# Patient Record
Sex: FEMALE | Race: WHITE | NOT HISPANIC OR LATINO | Employment: OTHER | URBAN - METROPOLITAN AREA
[De-identification: names, ages, dates, MRNs, and addresses within clinical notes are randomized per-mention and may not be internally consistent; named-entity substitution may affect disease eponyms.]

---

## 2017-01-06 ENCOUNTER — ANTICOAGULATION MONITORING (OUTPATIENT)
Dept: VASCULAR LAB | Facility: MEDICAL CENTER | Age: 82
End: 2017-01-06

## 2017-01-06 DIAGNOSIS — I48.91 ATRIAL FIBRILLATION, UNSPECIFIED TYPE (HCC): ICD-10-CM

## 2017-01-06 DIAGNOSIS — Z79.01 LONG TERM CURRENT USE OF ANTICOAGULANTS WITH INR GOAL OF 2.0-3.0: ICD-10-CM

## 2017-01-06 LAB — INR PPP: 3.3 (ref 2–3.5)

## 2017-01-06 NOTE — PROGRESS NOTES
Anticoagulation Summary as of 1/6/2017     INR goal 2.0-3.0   Selected INR 3.3! (1/6/2017)   Maintenance plan 2.5 mg (2.5 mg x 1) on Mon, Fri; 5 mg (2.5 mg x 2) all other days   Weekly total 30 mg   Plan last modified Toney Spicer, PHARMD (12/5/2016)   Next INR check 1/20/2017   Target end date     Indications   Long term current use of anticoagulants with INR goal of 2.0-3.0 [Z79.01]  Deep venous thrombosis  left [I82.402]  Atrial fibrillation [I48.91] [I48.91]         Anticoagulation Episode Summary     INR check location Home Draw    Preferred lab     Send INR reminders to     Comments       Anticoagulation Care Providers     Provider Role Specialty Phone number    Megan Howard A.P.VICTOR HUGO. Referring Family Medicine [phone]    Rachel Alejandra, PHARMD Responsible          Spoke with Marry to report a supra therapeutic INR of 3.3.  Will decrease tonight's dose to 1.25mg.  Pt wishes to alternate between 30mg/week and 32.5mg/week.  Lets try that and retest in two weeks.  Rachel Alejandra, JUDYD

## 2017-01-09 DIAGNOSIS — M54.50 CHRONIC MIDLINE LOW BACK PAIN WITHOUT SCIATICA: ICD-10-CM

## 2017-01-09 DIAGNOSIS — M06.9 RHEUMATOID ARTHRITIS, INVOLVING UNSPECIFIED SITE, UNSPECIFIED RHEUMATOID FACTOR PRESENCE: ICD-10-CM

## 2017-01-09 DIAGNOSIS — Z79.01 LONG TERM CURRENT USE OF ANTICOAGULANTS WITH INR GOAL OF 2.0-3.0: ICD-10-CM

## 2017-01-09 DIAGNOSIS — G89.29 CHRONIC MIDLINE LOW BACK PAIN WITHOUT SCIATICA: ICD-10-CM

## 2017-01-09 RX ORDER — HYDROCODONE BITARTRATE AND ACETAMINOPHEN 5; 325 MG/1; MG/1
TABLET ORAL
Qty: 30 TAB | Refills: 0 | Status: CANCELLED | OUTPATIENT
Start: 2017-01-09

## 2017-01-09 NOTE — TELEPHONE ENCOUNTER
Was the patient seen in the last year in this department? Yes     Does patient have an active prescription for medications requested? No     Received Request Via: Patient     Pt would like 3 month supply of Lyrica please. Its less money for 3 months. Thank you    She will  Norco tomorrow. Thank you

## 2017-01-10 DIAGNOSIS — M06.9 RHEUMATOID ARTHRITIS, INVOLVING UNSPECIFIED SITE, UNSPECIFIED RHEUMATOID FACTOR PRESENCE: ICD-10-CM

## 2017-01-10 RX ORDER — PREGABALIN 75 MG/1
75 CAPSULE ORAL 2 TIMES DAILY
Qty: 180 CAP | Refills: 1 | Status: SHIPPED | OUTPATIENT
Start: 2017-01-10 | End: 2017-11-02

## 2017-01-10 RX ORDER — HYDROCODONE BITARTRATE AND ACETAMINOPHEN 5; 325 MG/1; MG/1
TABLET ORAL
Qty: 30 TAB | Refills: 0 | Status: SHIPPED | OUTPATIENT
Start: 2017-01-10 | End: 2017-02-09 | Stop reason: SDUPTHER

## 2017-01-12 ENCOUNTER — OFFICE VISIT (OUTPATIENT)
Dept: RHEUMATOLOGY | Facility: PHYSICIAN GROUP | Age: 82
End: 2017-01-12
Payer: MEDICARE

## 2017-01-12 VITALS — WEIGHT: 168 LBS | BODY MASS INDEX: 29.4 KG/M2 | SYSTOLIC BLOOD PRESSURE: 130 MMHG | DIASTOLIC BLOOD PRESSURE: 72 MMHG

## 2017-01-12 DIAGNOSIS — I10 ESSENTIAL HYPERTENSION, BENIGN: ICD-10-CM

## 2017-01-12 DIAGNOSIS — R73.9 HYPERGLYCEMIA: ICD-10-CM

## 2017-01-12 DIAGNOSIS — I48.91 ATRIAL FIBRILLATION, UNSPECIFIED TYPE (HCC): ICD-10-CM

## 2017-01-12 DIAGNOSIS — M85.80 OSTEOPENIA: ICD-10-CM

## 2017-01-12 DIAGNOSIS — M06.9 RHEUMATOID ARTHRITIS, INVOLVING UNSPECIFIED SITE, UNSPECIFIED RHEUMATOID FACTOR PRESENCE: ICD-10-CM

## 2017-01-12 DIAGNOSIS — Z79.01 LONG TERM CURRENT USE OF ANTICOAGULANTS WITH INR GOAL OF 2.0-3.0: ICD-10-CM

## 2017-01-12 PROCEDURE — 99214 OFFICE O/P EST MOD 30 MIN: CPT | Performed by: INTERNAL MEDICINE

## 2017-01-12 NOTE — MR AVS SNAPSHOT
Marry Mathur   2017 1:30 PM   Office Visit   MRN: [id number]    Department:  Rheumatology Med [facility]   Dept Phone:  [phone]    Description:  Female : 1935   Provider:  Deanna Escoto M.D.           Reason for Visit     Follow-Up           Allergies as of 2017     Allergen Noted Reactions    Sulfa Drugs 2012   Vomiting    RXN=young person      Vital Signs     Blood Pressure Weight Smoking Status             130/72 mmHg 76.204 kg (168 lb) Never Smoker          Basic Information     Date Of Birth Sex Race Ethnicity Preferred Language    1935 Female White Non- English      Your appointments     Mar 16, 2017  1:00 PM   Follow Up Visit with Deanna Escoto M.D.   [facility] (--)    [address] [place] [address], [facility] [address]  [facility] [id number] [phone]           You will be receiving a confirmation call a few days before your appointment from our automated call confirmation system.              Problem List              ICD-10-CM Priority Class Noted - Resolved    Nonspecific abnormal electrocardiogram (ECG) (EKG) R94.31 High  2012 - Present    Right bundle branch block I45.10 High  2012 - Present    Essential hypertension, benign I10 High  2012 - Present    Other and unspecified hyperlipidemia E78.5 High  2012 - Present    Hyperglycemia R73.9 High  2012 - Present    GERD (gastroesophageal reflux disease) K21.9 High  2012 - Present    Back pain M54.9 High  2012 - Present    Long term current use of anticoagulants with INR goal of 2.0-3.0 Z79.01   3/8/2016 - Present    Deep venous thrombosis, left I82.402   3/8/2016 - Present    Rheumatoid arthritis (HCC) M06.9   3/14/2016 - Present    Atrial fibrillation [I48.91] I48.91   2016 - Present    Hernia, inguinal, right K40.90   2016 - Present      Health Maintenance        Date Due Completion Dates    IMM DTaP/Tdap/Td Vaccine (1 - Tdap) 1955 ---    COLONOSCOPY  8/27/1986 ---    IMM ZOSTER VACCINE 8/27/1996 ---    IMM PNEUMOCOCCAL 65+ (ADULT) LOW/MEDIUM RISK SERIES (2 of 2 - PPSV23) 3/8/2017 3/8/2016    MAMMOGRAM 3/18/2017 3/18/2016, 3/10/2016, 10/9/2006, 10/7/2005, 10/5/2004    BONE DENSITY 3/18/2021 3/18/2016            Current Immunizations     13-VALENT PCV PREVNAR 3/8/2016    Influenza Vaccine Adult HD 11/1/2016      Below and/or attached are the medications your provider expects you to take. Review all of your home medications and newly ordered medications with your provider and/or pharmacist. Follow medication instructions as directed by your provider and/or pharmacist. Please keep your medication list with you and share with your provider. Update the information when medications are discontinued, doses are changed, or new medications (including over-the-counter products) are added; and carry medication information at all times in the event of emergency situations     Allergies:  SULFA DRUGS - Vomiting               Medications  Valid as of: January 12, 2017 -  2:13 PM    Generic Name Brand Name Tablet Size Instructions for use    Acetaminophen (Tab) TYLENOL 325 MG Take 650 mg by mouth at bedtime as needed.        Adalimumab (Prefilled Syringe Kit) Adalimumab 40 MG/0.8ML Inject 0.8 mL as instructed every 14 days.        Atorvastatin Calcium (Tab) LIPITOR 20 MG Take 20 mg by mouth every evening.        Calcium Citrate-Vitamin D   Take 1 Tab by mouth every day.        Cholecalciferol (Tab) cholecalciferol 1000 UNIT Take 1,000 Units by mouth every day.        Folic Acid (Tab) FOLVITE 400 MCG Take 400 mcg by mouth every day.        Hydrocodone-Acetaminophen (Tab) NORCO 5-325 MG 1 tab po qhs for severe joint pain, NO ALCOHOL and NO DRIVING within 24 hrs of taking this medication, NO BENZODIAZEPINE medications, no selling or giving to any other persons        Magnesium Oxide (Tab) MAG- MG Take 400 mg by mouth 2 times a day.        MetFORMIN HCl (Tab) GLUCOPHAGE 500  MG Take 500 mg by mouth every day.        Methotrexate Sodium (Tab) methotrexate 2.5 MG 6 tabs po q wednesday        MethylPREDNISolone (Tab) MEDROL 4 MG 6 tabs po qday for one day then 5 tabs po qday for one day then 4 tabs po qday for one day then 3 tabs po qday for one day then 2 tabs po qday for one day then 1 tab po qday for one day then stop        Misc Natural Products   Take  by mouth.        Omeprazole (CAPSULE DELAYED RELEASE) PRILOSEC 20 MG Take 20 mg by mouth every day.        Potassium Gluconate   Take 1 Tab by mouth every day.        Pregabalin (Cap) LYRICA 75 MG Take 1 Cap by mouth 2 times a day.        Valsartan-Hydrochlorothiazide (Tab) DIOVAN-HCT 80-12.5 MG Take 0.5 Tabs by mouth every day.        Warfarin Sodium (Tab) COUMADIN 2.5 MG Take 2.5 mg by mouth every day. AS INSTRUCTED         .                 Medicines prescribed today were sent to:     [facility] [place] [address] [place] [address] [address] [facility] [address]    Phone: [phone] Fax: [phone]    Open 24 Hours?: No    [facility] [place] [address] AT [place] [address]  [address] [address], [place] [address] [facility] [address]    Phone: [phone] Fax: [phone]    Open 24 Hours?: No    [facility] [id number] [place], [place] - [address] [address]    Phone: [phone] Fax: [phone]    Open 24 Hours?: No      Medication refill instructions:       If your prescription bottle indicates you have medication refills left, it is not necessary to call your provider’s office. Please contact your pharmacy and they will refill your medication.    If your prescription bottle indicates you do not have any refills left, you may request refills at any time through one of the following ways: The online [employer] system (except Urgent Care), by calling your provider’s office, or by asking your pharmacy to contact your  provider’s office with a refill request. Medication refills are processed only during regular business hours and may not be available until the next business day. Your provider may request additional information or to have a follow-up visit with you prior to refilling your medication.   *Please Note: Medication refills are assigned a new Rx number when refilled electronically. Your pharmacy may indicate that no refills were authorized even though a new prescription for the same medication is available at the pharmacy. Please request the medicine by name with the pharmacy before contacting your provider for a refill.           [employer] Access Code: Activation code not generated  Current [employer] Status: Active

## 2017-01-18 DIAGNOSIS — I48.91 ATRIAL FIBRILLATION, UNSPECIFIED TYPE (HCC): ICD-10-CM

## 2017-01-18 RX ORDER — WARFARIN SODIUM 2.5 MG/1
TABLET ORAL
Qty: 180 TAB | Refills: 1 | Status: SHIPPED | OUTPATIENT
Start: 2017-01-18 | End: 2017-09-12 | Stop reason: SDUPTHER

## 2017-01-24 ENCOUNTER — ANTICOAGULATION MONITORING (OUTPATIENT)
Dept: VASCULAR LAB | Facility: MEDICAL CENTER | Age: 82
End: 2017-01-24

## 2017-01-24 DIAGNOSIS — Z79.01 LONG TERM CURRENT USE OF ANTICOAGULANTS WITH INR GOAL OF 2.0-3.0: ICD-10-CM

## 2017-01-24 DIAGNOSIS — I48.91 ATRIAL FIBRILLATION, UNSPECIFIED TYPE (HCC): ICD-10-CM

## 2017-01-24 LAB — INR PPP: 2 (ref 2–3.5)

## 2017-01-24 NOTE — PROGRESS NOTES
Anticoagulation Summary as of 1/24/2017     INR goal 2.0-3.0   Selected INR 2.0 (1/24/2017)   Maintenance plan 2.5 mg (2.5 mg x 1) on Mon, Fri; 5 mg (2.5 mg x 2) all other days   Weekly total 30 mg   Plan last modified Toney Spicer, PHARMD (12/5/2016)   Next INR check 2/7/2017   Target end date     Indications   Long term current use of anticoagulants with INR goal of 2.0-3.0 [Z79.01]  Deep venous thrombosis  left [I82.402]  Atrial fibrillation [I48.91] [I48.91]         Anticoagulation Episode Summary     INR check location Home Draw    Preferred lab     Send INR reminders to     Comments       Anticoagulation Care Providers     Provider Role Specialty Phone number    Megan Howard A.P.VICTOR HUGO. Referring Family Medicine [phone]    Rachel Alejandra, JUDYD Responsible          Anticoagulation Patient Findings    Left voicemail message to report a therapeutic INR of 2.0.  Pt to continue with current warfarin dosing regimen. Requested pt contact the clinic for any s/s of unusual bleeding, bruising, clotting or any changes to diet or medication. FU 2 weeks.  Steve Hahn, PHARMD

## 2017-02-07 ENCOUNTER — ANTICOAGULATION MONITORING (OUTPATIENT)
Dept: VASCULAR LAB | Facility: MEDICAL CENTER | Age: 82
End: 2017-02-07

## 2017-02-07 DIAGNOSIS — Z79.01 LONG TERM CURRENT USE OF ANTICOAGULANTS WITH INR GOAL OF 2.0-3.0: ICD-10-CM

## 2017-02-07 DIAGNOSIS — I48.0 PAROXYSMAL ATRIAL FIBRILLATION (HCC): ICD-10-CM

## 2017-02-07 LAB — INR PPP: 2.8 (ref 2–3.5)

## 2017-02-07 NOTE — PROGRESS NOTES
Anticoagulation Summary as of 2/7/2017     INR goal 2.0-3.0   Selected INR 2.8 (2/7/2017)   Maintenance plan 2.5 mg (2.5 mg x 1) on Mon, Fri; 5 mg (2.5 mg x 2) all other days   Weekly total 30 mg   Plan last modified Toney Spicer, PHARMD (12/5/2016)   Next INR check 2/21/2017   Target end date     Indications   Long term current use of anticoagulants with INR goal of 2.0-3.0 [Z79.01]  Deep venous thrombosis  left [I82.402]  Atrial fibrillation [I48.91] [I48.91]         Anticoagulation Episode Summary     INR check location Home Draw    Preferred lab     Send INR reminders to     Comments       Anticoagulation Care Providers     Provider Role Specialty Phone number    CELIO Baeaz. Referring Family Medicine [phone]    Rachel Alejandra, PHARMD Responsible          Left voicemail message to report a therapeutic INR of 2.8.  Pt to continue with current warfarin dosing regimen. Requested pt contact the clinic for any s/s of unusual bleeding, bruising, clotting or any changes to diet or medication. FU 2 weeks.  Rachel Alejandra, PHARMD

## 2017-02-08 DIAGNOSIS — M06.9 RHEUMATOID ARTHRITIS, INVOLVING UNSPECIFIED SITE, UNSPECIFIED RHEUMATOID FACTOR PRESENCE: ICD-10-CM

## 2017-02-08 RX ORDER — HYDROCODONE BITARTRATE AND ACETAMINOPHEN 5; 325 MG/1; MG/1
TABLET ORAL
Qty: 30 TAB | Refills: 0 | Status: CANCELLED | OUTPATIENT
Start: 2017-02-08

## 2017-02-09 DIAGNOSIS — M06.9 RHEUMATOID ARTHRITIS, INVOLVING UNSPECIFIED SITE, UNSPECIFIED RHEUMATOID FACTOR PRESENCE: ICD-10-CM

## 2017-02-09 RX ORDER — HYDROCODONE BITARTRATE AND ACETAMINOPHEN 5; 325 MG/1; MG/1
TABLET ORAL
Qty: 30 TAB | Refills: 0 | Status: SHIPPED | OUTPATIENT
Start: 2017-02-09 | End: 2017-03-09 | Stop reason: SDUPTHER

## 2017-02-09 NOTE — TELEPHONE ENCOUNTER
Was the patient seen in the last year in this department? Yes   Dec labs   Does patient have an active prescription for medications requested? No     Received Request Via: Patient       PT WOULD LIKE TO  THUR AT 1:00PM    THANK YOU

## 2017-02-16 ENCOUNTER — OFFICE VISIT (OUTPATIENT)
Dept: MEDICAL GROUP | Facility: PHYSICIAN GROUP | Age: 82
End: 2017-02-16
Payer: MEDICARE

## 2017-02-16 VITALS
BODY MASS INDEX: 28.35 KG/M2 | OXYGEN SATURATION: 95 % | SYSTOLIC BLOOD PRESSURE: 128 MMHG | RESPIRATION RATE: 12 BRPM | HEIGHT: 63 IN | TEMPERATURE: 97.9 F | DIASTOLIC BLOOD PRESSURE: 76 MMHG | WEIGHT: 160 LBS | HEART RATE: 69 BPM

## 2017-02-16 DIAGNOSIS — J20.9 ACUTE BRONCHITIS, UNSPECIFIED ORGANISM: Primary | ICD-10-CM

## 2017-02-16 PROCEDURE — 99214 OFFICE O/P EST MOD 30 MIN: CPT | Performed by: NURSE PRACTITIONER

## 2017-02-16 PROCEDURE — 1101F PT FALLS ASSESS-DOCD LE1/YR: CPT | Performed by: NURSE PRACTITIONER

## 2017-02-16 PROCEDURE — G8482 FLU IMMUNIZE ORDER/ADMIN: HCPCS | Performed by: NURSE PRACTITIONER

## 2017-02-16 PROCEDURE — 1036F TOBACCO NON-USER: CPT | Performed by: NURSE PRACTITIONER

## 2017-02-16 PROCEDURE — 4040F PNEUMOC VAC/ADMIN/RCVD: CPT | Performed by: NURSE PRACTITIONER

## 2017-02-16 PROCEDURE — G8420 CALC BMI NORM PARAMETERS: HCPCS | Performed by: NURSE PRACTITIONER

## 2017-02-16 PROCEDURE — G8432 DEP SCR NOT DOC, RNG: HCPCS | Performed by: NURSE PRACTITIONER

## 2017-02-16 RX ORDER — DOXYCYCLINE HYCLATE 100 MG
100 TABLET ORAL 2 TIMES DAILY
Qty: 20 TAB | Refills: 0 | Status: SHIPPED | OUTPATIENT
Start: 2017-02-16 | End: 2017-02-26

## 2017-02-16 ASSESSMENT — PATIENT HEALTH QUESTIONNAIRE - PHQ9: CLINICAL INTERPRETATION OF PHQ2 SCORE: 0

## 2017-02-16 ASSESSMENT — ENCOUNTER SYMPTOMS: COUGH: 1

## 2017-02-16 NOTE — MR AVS SNAPSHOT
"        Marry Mathur   2017 3:20 PM   Office Visit   MRN: [id number]    Department:  [facility]   Dept Phone:  [phone]    Description:  Female : 1935   Provider:  ELBA Baeza           Reason for Visit     Cough with yellow and red phlegm       Allergies as of 2017     Allergen Noted Reactions    Sulfa Drugs 2012   Vomiting    RXN=young person      You were diagnosed with     Acute bronchitis, unspecified organism   [[id number]]  -  Primary       Vital Signs     Blood Pressure Pulse Temperature Respirations Height Weight    128/76 mmHg 69 36.6 °C (97.9 °F) 12 1.6 m (5' 3\") 72.576 kg (160 lb)    Body Mass Index Oxygen Saturation Smoking Status             28.35 kg/m2 95% Never Smoker          Basic Information     Date Of Birth Sex Race Ethnicity Preferred Language    1935 Female White Non- English      Your appointments     Mar 03, 2017  3:15 PM   ECHO with ECHO [facility], IHV EXAM 12   ECHOCARDIOLOGY [facility] ([facility])    [address] [place] [address]   [phone]           No prep            Mar 16, 2017  1:00 PM   Follow Up Visit with Deanna Escoto M.D.   [facility] (--)    [address] [facility], [address]  [facility] [id number] [phone]           You will be receiving a confirmation call a few days before your appointment from our automated call confirmation system.              Problem List              ICD-10-CM Priority Class Noted - Resolved    Nonspecific abnormal electrocardiogram (ECG) (EKG) R94.31 High  2012 - Present    Right bundle branch block I45.10 High  2012 - Present    Essential hypertension, benign I10 High  2012 - Present    Other and unspecified hyperlipidemia E78.5 High  2012 - Present    Hyperglycemia R73.9 High  2012 - Present    GERD (gastroesophageal reflux disease) K21.9 High  2012 - Present    Back pain M54.9 High  2012 - Present    Long term current use of anticoagulants " with INR goal of 2.0-3.0 Z79.01   3/8/2016 - Present    Deep venous thrombosis, left I82.402   3/8/2016 - Present    Rheumatoid arthritis (CMS-HCC) M06.9   3/14/2016 - Present    Atrial fibrillation [I48.91] I48.91   4/19/2016 - Present    Hernia, inguinal, right K40.90   9/23/2016 - Present      Health Maintenance        Date Due Completion Dates    IMM DTaP/Tdap/Td Vaccine (1 - Tdap) 8/27/1955 ---    COLONOSCOPY 8/27/1986 ---    IMM ZOSTER VACCINE 8/27/1996 ---    IMM PNEUMOCOCCAL 65+ (ADULT) LOW/MEDIUM RISK SERIES (2 of 2 - PPSV23) 3/8/2017 3/8/2016    MAMMOGRAM 3/18/2017 3/18/2016, 3/10/2016, 10/9/2006, 10/7/2005, 10/5/2004    BONE DENSITY 3/18/2021 3/18/2016            Current Immunizations     13-VALENT PCV PREVNAR 3/8/2016    Influenza Vaccine Adult HD 11/1/2016      Below and/or attached are the medications your provider expects you to take. Review all of your home medications and newly ordered medications with your provider and/or pharmacist. Follow medication instructions as directed by your provider and/or pharmacist. Please keep your medication list with you and share with your provider. Update the information when medications are discontinued, doses are changed, or new medications (including over-the-counter products) are added; and carry medication information at all times in the event of emergency situations     Allergies:  SULFA DRUGS - Vomiting               Medications  Valid as of: February 16, 2017 -  5:46 PM    Generic Name Brand Name Tablet Size Instructions for use    Acetaminophen (Tab) TYLENOL 325 MG Take 650 mg by mouth at bedtime as needed.        Adalimumab (Prefilled Syringe Kit) Adalimumab 40 MG/0.8ML Inject 0.8 mL as instructed every 14 days.        Atorvastatin Calcium (Tab) LIPITOR 20 MG Take 20 mg by mouth every evening.        Calcium Citrate-Vitamin D   Take 1 Tab by mouth every day.        Cholecalciferol (Tab) cholecalciferol 1000 UNIT Take 1,000 Units by mouth every day.         Doxycycline Hyclate (Tab) VIBRAMYCIN 100 MG Take 1 Tab by mouth 2 times a day for 10 days.        Folic Acid (Tab) FOLVITE 400 MCG Take 400 mcg by mouth every day.        Hydrocodone-Acetaminophen (Tab) NORCO 5-325 MG 1 tab po qhs for severe joint pain, NO ALCOHOL and NO DRIVING within 24 hrs of taking this medication, NO BENZODIAZEPINE medications, no selling or giving to any other persons        Magnesium Oxide (Tab) MAG- MG Take 400 mg by mouth 2 times a day.        MetFORMIN HCl (Tab) GLUCOPHAGE 500 MG Take 500 mg by mouth every day.        Methotrexate Sodium (Tab) methotrexate 2.5 MG 6 tabs po q wednesday        Misc Natural Products   Take  by mouth.        Omeprazole (CAPSULE DELAYED RELEASE) PRILOSEC 20 MG Take 20 mg by mouth every day.        Potassium Gluconate   Take 1 Tab by mouth every day.        Pregabalin (Cap) LYRICA 75 MG Take 1 Cap by mouth 2 times a day.        Valsartan-Hydrochlorothiazide (Tab) DIOVAN-HCT 80-12.5 MG Take 0.5 Tabs by mouth every day.        Warfarin Sodium (Tab) COUMADIN 2.5 MG Take 1 to 2 tablets by mouth daily as directed by the coumadin clinic        .                 Medicines prescribed today were sent to:     [facility] [place] [address] [address] [address] [facility] [address]    Phone: [phone] Fax: [phone]    Open 24 Hours?: No    [facility] [place] [address] AT [facility] & [place] [address]  [address] [address], [place] [address] [facility] [address]    Phone: [phone] Fax: [phone]    Open 24 Hours?: No    [facility] - [place], [place] - [address] [address]    Phone: [phone] Fax: [phone]    Open 24 Hours?: No      Medication refill instructions:       If your prescription bottle indicates you have medication refills left, it is not necessary to call your provider’s office. Please contact your pharmacy and they will refill  your medication.    If your prescription bottle indicates you do not have any refills left, you may request refills at any time through one of the following ways: The online [employer] system (except Urgent Care), by calling your provider’s office, or by asking your pharmacy to contact your provider’s office with a refill request. Medication refills are processed only during regular business hours and may not be available until the next business day. Your provider may request additional information or to have a follow-up visit with you prior to refilling your medication.   *Please Note: Medication refills are assigned a new Rx number when refilled electronically. Your pharmacy may indicate that no refills were authorized even though a new prescription for the same medication is available at the pharmacy. Please request the medicine by name with the pharmacy before contacting your provider for a refill.           [employer] Access Code: Activation code not generated  Current [employer] Status: Active

## 2017-02-16 NOTE — Clinical Note
February 16, 2017        Marry Mathur  [address]  [place] [facility] [address]        To Whom it May Concern:    Marry remains under my care for her chronic medical needs.  We have discussed weight loss and have agreed that 150 pounds is a reasonable and healthy goal weight.    If you have any questions or concerns, please don't hesitate to call.        Sincerely,        CELIO Baeza.    Electronically Signed

## 2017-02-16 NOTE — PROGRESS NOTES
Subjective:      Marry Mathur is a 81 y.o. female who presents with Cough            Cough    Marry is here today to discuss the following new problem:    1. Acute bronchitis, unspecified organism  Patient reports a productive cough, that started on Saturday.  She states that she had a new pack of azithromycin and started this with completion.  She reports significant improvement however she has been experiencing persistent productive cough, congestion and audible wheezing.  She denies any measurable fever, but does report having some chills with hot flashes.  She denies any known sick contacts.  She's also been taking over-the-counter Mucinex and Ayaka-Quincy inconsistently, with good relief when taken.  Denies any shortness of breath or chest pain.    Active Ambulatory Problems     Diagnosis Date Noted   • Nonspecific abnormal electrocardiogram (ECG) (EKG) 06/27/2012   • Right bundle branch block 06/27/2012   • Essential hypertension, benign 06/27/2012   • Other and unspecified hyperlipidemia 06/27/2012   • Hyperglycemia 06/27/2012   • GERD (gastroesophageal reflux disease) 06/27/2012   • Back pain 06/27/2012   • Long term current use of anticoagulants with INR goal of 2.0-3.0 03/08/2016   • Deep venous thrombosis, left 03/08/2016   • Rheumatoid arthritis (CMS-[facility]) 03/14/2016   • Atrial fibrillation [I48.91] 04/19/2016   • Hernia, inguinal, right 09/23/2016     Resolved Ambulatory Problems     Diagnosis Date Noted   • Deep venous thrombosis (CMS-[facility]) 06/27/2012     Past Medical History   Diagnosis Date   • Prediabetes    • Hyperlipidemia    • Hypertension    • Rheumatoid arthritis with rheumatoid factor (CMS-HCC)    • Hiatus hernia syndrome    • Cancer (CMS-HCC)    • Chronic back pain greater than 3 months duration    • Blood clotting disorder (CMS-[facility]) 2012     Review of Systems   Respiratory: Positive for cough.         See HPI          Objective:     /76 mmHg  Pulse 69  Temp(Src) 36.6 °C (97.9 °F)   "Resp 12  Ht 1.6 m (5' 3\")  Wt 72.576 kg (160 lb)  BMI 28.35 kg/m2  SpO2 95%     Physical Exam   Constitutional: She appears well-developed and well-nourished.   Eyes: Conjunctivae and EOM are normal. Pupils are equal, round, and reactive to light.   Neck: Normal range of motion. Neck supple.   Cardiovascular: Normal rate.    Pulmonary/Chest: Effort normal. No respiratory distress. She has wheezes. She has rales. She exhibits no tenderness.   Psychiatric: She has a normal mood and affect. Her behavior is normal.   Nursing note and vitals reviewed.              Assessment/Plan:     1. Acute bronchitis, unspecified organism  Encourage patient to use over-the-counter remedies consistently for the next day or 2.  Discussed criteria that would warrant her starting the antibiotic.  Encouraged frequent hydration and rest.  Return to clinic if symptoms worsen or fail to improve despite a second course of antibiotics.  - doxycycline (VIBRAMYCIN) 100 MG Tab; Take 1 Tab by mouth 2 times a day for 10 days.  Dispense: 20 Tab; Refill: 0        "

## 2017-02-21 LAB — INR PPP: 2.8 (ref 2–3.5)

## 2017-02-22 ENCOUNTER — ANTICOAGULATION MONITORING (OUTPATIENT)
Dept: VASCULAR LAB | Facility: MEDICAL CENTER | Age: 82
End: 2017-02-22

## 2017-02-22 DIAGNOSIS — I48.0 PAROXYSMAL ATRIAL FIBRILLATION (HCC): ICD-10-CM

## 2017-02-22 DIAGNOSIS — Z79.01 LONG TERM CURRENT USE OF ANTICOAGULANTS WITH INR GOAL OF 2.0-3.0: ICD-10-CM

## 2017-02-22 NOTE — PROGRESS NOTES
Anticoagulation Summary as of 2/22/2017     INR goal 2.0-3.0   Selected INR 2.8 (2/21/2017)   Maintenance plan 2.5 mg (2.5 mg x 1) on Mon, Fri; 5 mg (2.5 mg x 2) all other days   Weekly total 30 mg   Plan last modified Toney Spicer, PHARMD (12/5/2016)   Next INR check 2/28/2017   Target end date     Indications   Long term current use of anticoagulants with INR goal of 2.0-3.0 [Z79.01]  Deep venous thrombosis  left [I82.402]  Atrial fibrillation [I48.91] [I48.91]         Anticoagulation Episode Summary     INR check location Home Draw    Preferred lab     Send INR reminders to     Comments       Anticoagulation Care Providers     Provider Role Specialty Phone number    CELIO Baeza. Referring Family Medicine [phone]    Rachel Alejandra, PHARMD Responsible          Spoke with Marry to report a therapeutic INR of 2.8. Pt will start a 10 day course of doxycycline tonight. Pt is to continue with current warfarin dosing regimen.  Follow up in 1 weeks.    Rachel Alejandra, JUDYD

## 2017-02-28 ENCOUNTER — HOSPITAL ENCOUNTER (OUTPATIENT)
Dept: LAB | Facility: MEDICAL CENTER | Age: 82
End: 2017-02-28
Attending: INTERNAL MEDICINE
Payer: MEDICARE

## 2017-02-28 LAB
ALBUMIN SERPL BCP-MCNC: 4.2 G/DL (ref 3.2–4.9)
ALBUMIN/GLOB SERPL: 1.4 G/DL
ALP SERPL-CCNC: 53 U/L (ref 30–99)
ALT SERPL-CCNC: 18 U/L (ref 2–50)
ANION GAP SERPL CALC-SCNC: 6 MMOL/L (ref 0–11.9)
AST SERPL-CCNC: 19 U/L (ref 12–45)
BASOPHILS # BLD AUTO: 0.06 K/UL (ref 0–0.12)
BASOPHILS NFR BLD AUTO: 1.1 % (ref 0–1.8)
BILIRUB SERPL-MCNC: 1.2 MG/DL (ref 0.1–1.5)
BUN SERPL-MCNC: 27 MG/DL (ref 8–22)
CALCIUM SERPL-MCNC: 9.8 MG/DL (ref 8.5–10.5)
CHLORIDE SERPL-SCNC: 107 MMOL/L (ref 96–112)
CHOLEST SERPL-MCNC: 151 MG/DL (ref 100–199)
CO2 SERPL-SCNC: 30 MMOL/L (ref 20–33)
CREAT SERPL-MCNC: 0.7 MG/DL (ref 0.5–1.4)
EOSINOPHIL # BLD: 0.18 K/UL (ref 0–0.51)
EOSINOPHIL NFR BLD AUTO: 3.2 % (ref 0–6.9)
ERYTHROCYTE [DISTWIDTH] IN BLOOD BY AUTOMATED COUNT: 48 FL (ref 35.9–50)
GLOBULIN SER CALC-MCNC: 2.9 G/DL (ref 1.9–3.5)
GLUCOSE SERPL-MCNC: 86 MG/DL (ref 65–99)
HCT VFR BLD AUTO: 45.4 % (ref 37–47)
HDLC SERPL-MCNC: 54 MG/DL
HGB BLD-MCNC: 14.5 G/DL (ref 12–16)
IMM GRANULOCYTES # BLD AUTO: 0.01 K/UL (ref 0–0.11)
IMM GRANULOCYTES NFR BLD AUTO: 0.2 % (ref 0–0.9)
LDLC SERPL CALC-MCNC: 84 MG/DL
LYMPHOCYTES # BLD: 1.92 K/UL (ref 1–4.8)
LYMPHOCYTES NFR BLD AUTO: 34.3 % (ref 22–41)
MCH RBC QN AUTO: 30.9 PG (ref 27–33)
MCHC RBC AUTO-ENTMCNC: 31.9 G/DL (ref 33.6–35)
MCV RBC AUTO: 96.8 FL (ref 81.4–97.8)
MONOCYTES # BLD: 0.44 K/UL (ref 0–0.85)
MONOCYTES NFR BLD AUTO: 7.9 % (ref 0–13.4)
NEUTROPHILS # BLD: 2.99 K/UL (ref 2–7.15)
NEUTROPHILS NFR BLD AUTO: 53.3 % (ref 44–72)
NRBC # BLD AUTO: 0 K/UL
NRBC BLD-RTO: 0 /100 WBC
PLATELET # BLD AUTO: 178 K/UL (ref 164–446)
PMV BLD AUTO: 12.1 FL (ref 9–12.9)
POTASSIUM SERPL-SCNC: 4 MMOL/L (ref 3.6–5.5)
PROT SERPL-MCNC: 7.1 G/DL (ref 6–8.2)
RBC # BLD AUTO: 4.69 M/UL (ref 4.2–5.4)
SODIUM SERPL-SCNC: 143 MMOL/L (ref 135–145)
TRIGL SERPL-MCNC: 67 MG/DL (ref 0–149)
WBC # BLD AUTO: 5.6 K/UL (ref 4.8–10.8)

## 2017-02-28 PROCEDURE — 80053 COMPREHEN METABOLIC PANEL: CPT

## 2017-02-28 PROCEDURE — 85025 COMPLETE CBC W/AUTO DIFF WBC: CPT

## 2017-02-28 PROCEDURE — 80061 LIPID PANEL: CPT

## 2017-02-28 PROCEDURE — 36415 COLL VENOUS BLD VENIPUNCTURE: CPT

## 2017-03-03 ENCOUNTER — HOSPITAL ENCOUNTER (OUTPATIENT)
Dept: CARDIOLOGY | Facility: MEDICAL CENTER | Age: 82
End: 2017-03-03
Attending: INTERNAL MEDICINE
Payer: MEDICARE

## 2017-03-03 DIAGNOSIS — R01.1 CARDIAC MURMUR: ICD-10-CM

## 2017-03-03 PROCEDURE — 93306 TTE W/DOPPLER COMPLETE: CPT

## 2017-03-05 LAB
LV EJECT FRACT MOD 2C 99903: 63.61
LV EJECT FRACT MOD 4C 99902: 65.52
LV EJECT FRACT MOD BP 99901: 65.14

## 2017-03-09 DIAGNOSIS — M06.9 RHEUMATOID ARTHRITIS, INVOLVING UNSPECIFIED SITE, UNSPECIFIED RHEUMATOID FACTOR PRESENCE: ICD-10-CM

## 2017-03-09 RX ORDER — HYDROCODONE BITARTRATE AND ACETAMINOPHEN 5; 325 MG/1; MG/1
TABLET ORAL
Qty: 30 TAB | Refills: 0 | Status: SHIPPED | OUTPATIENT
Start: 2017-03-09 | End: 2017-04-10 | Stop reason: SDUPTHER

## 2017-03-09 RX ORDER — HYDROCODONE BITARTRATE AND ACETAMINOPHEN 5; 325 MG/1; MG/1
TABLET ORAL
Qty: 30 TAB | Refills: 0 | Status: CANCELLED | OUTPATIENT
Start: 2017-03-09

## 2017-03-09 NOTE — TELEPHONE ENCOUNTER
Was the patient seen in the last year in this department? Yes   Feb labs    Does patient have an active prescription for medications requested? No     Received Request Via: Patient     Pt would like to  tomorrow at noon (fri)

## 2017-03-16 ENCOUNTER — OFFICE VISIT (OUTPATIENT)
Dept: RHEUMATOLOGY | Facility: PHYSICIAN GROUP | Age: 82
End: 2017-03-16
Payer: MEDICARE

## 2017-03-16 VITALS
TEMPERATURE: 98.1 F | SYSTOLIC BLOOD PRESSURE: 120 MMHG | OXYGEN SATURATION: 96 % | DIASTOLIC BLOOD PRESSURE: 60 MMHG | WEIGHT: 162 LBS | BODY MASS INDEX: 28.7 KG/M2 | HEART RATE: 76 BPM | RESPIRATION RATE: 12 BRPM

## 2017-03-16 DIAGNOSIS — M85.80 OSTEOPENIA: ICD-10-CM

## 2017-03-16 DIAGNOSIS — R73.9 HYPERGLYCEMIA: ICD-10-CM

## 2017-03-16 DIAGNOSIS — I48.0 PAROXYSMAL ATRIAL FIBRILLATION (HCC): ICD-10-CM

## 2017-03-16 DIAGNOSIS — G89.29 CHRONIC BILATERAL LOW BACK PAIN WITHOUT SCIATICA: ICD-10-CM

## 2017-03-16 DIAGNOSIS — M06.9 RHEUMATOID ARTHRITIS, INVOLVING UNSPECIFIED SITE, UNSPECIFIED RHEUMATOID FACTOR PRESENCE: ICD-10-CM

## 2017-03-16 DIAGNOSIS — Z79.01 LONG TERM CURRENT USE OF ANTICOAGULANTS WITH INR GOAL OF 2.0-3.0: ICD-10-CM

## 2017-03-16 DIAGNOSIS — M54.50 CHRONIC BILATERAL LOW BACK PAIN WITHOUT SCIATICA: ICD-10-CM

## 2017-03-16 DIAGNOSIS — I10 ESSENTIAL HYPERTENSION, BENIGN: ICD-10-CM

## 2017-03-16 PROCEDURE — G8482 FLU IMMUNIZE ORDER/ADMIN: HCPCS | Performed by: INTERNAL MEDICINE

## 2017-03-16 PROCEDURE — G8432 DEP SCR NOT DOC, RNG: HCPCS | Performed by: INTERNAL MEDICINE

## 2017-03-16 PROCEDURE — 99214 OFFICE O/P EST MOD 30 MIN: CPT | Performed by: INTERNAL MEDICINE

## 2017-03-16 PROCEDURE — 4040F PNEUMOC VAC/ADMIN/RCVD: CPT | Performed by: INTERNAL MEDICINE

## 2017-03-16 PROCEDURE — 1101F PT FALLS ASSESS-DOCD LE1/YR: CPT | Performed by: INTERNAL MEDICINE

## 2017-03-16 PROCEDURE — 1036F TOBACCO NON-USER: CPT | Performed by: INTERNAL MEDICINE

## 2017-03-16 PROCEDURE — G8420 CALC BMI NORM PARAMETERS: HCPCS | Performed by: INTERNAL MEDICINE

## 2017-03-16 RX ORDER — LIDOCAINE 50 MG/G
1 PATCH TOPICAL EVERY 24 HOURS
Qty: 90 PATCH | Refills: 1 | Status: SHIPPED | OUTPATIENT
Start: 2017-03-16 | End: 2018-04-30 | Stop reason: SDUPTHER

## 2017-03-16 NOTE — PROGRESS NOTES
Chief Complaint- joint pain    Subjective:   Marry Mathur is a 81 y.o. female here today for follow up of rheumatological issues    Pt here with a dx of RA at Dr Peguero office currently on  MTX 15 mg po qweek and Humira injections 40 mg subcutaneous every 2 weeks. Patient had been on Orencia but that lost its efficacy. No psoriaisis, positive skin cancer, positive melanoma 2001, no iritis/uveitis, no FUO, no weight loss, no recurrent infections, no colitis, no recurrent infections, no new neurological disorders, no fevers of unknown etiology. States that she is feeling pretty good is anxious to get out in the garden and start gardening, fingers are moving a little bit better.  Pt also diabetic, takes metformin, also with a diagnosis of spinal stenosis with intermittent weakness in her legs. Patient currently undergoing physical therapy for balance and strength as in her legs, thinks it is helping her. Denies any new rashes, denies any unexplained weight loss, denies any new skin cancers, is followed very closely by her dermatologist regarding skin cancers. Patient states her predominant issues low back problems patient's of low back problems for years, undergoes physical therapy 3 times a week already.    S/p Remicade-stopped because of hx of melanoma about 1996 and has multiple other skin cancers  S/p Orencia-lost efficacy      RF neg 6/2016  CCP neg 6/2016  Uric acid 4.5 6/2016  Quantiferon Gold neg 6/2016  Hep B neg 6/2016  Hand X-rays 3/2016  Indicates DJD  Feet x-rays 3/2016  Indicates DJD  DEXA 3/2016 T scores 1.1, -1.1   Echocardiogram 7/2012 [facility]            Current medicines (including changes today)  Current Outpatient Prescriptions   Medication Sig Dispense Refill   • lidocaine (LIDODERM) 5 % Patch Apply 1 Patch to skin as directed every 24 hours. 90 Patch 1   • hydrocodone-acetaminophen (NORCO) 5-325 MG Tab per tablet 1 tab po qhs for severe joint pain, NO ALCOHOL  and NO DRIVING within 24 hrs of taking this medication, NO BENZODIAZEPINE medications, no selling or giving to any other persons 30 Tab 0   • warfarin (COUMADIN) 2.5 MG Tab Take 1 to 2 tablets by mouth daily as directed by the coumadin clinic 180 Tab 1   • pregabalin (LYRICA) 75 MG Cap Take 1 Cap by mouth 2 times a day. 180 Cap 1   • methotrexate 2.5 MG Tab 6 tabs po q wednesday 72 Tab 0   • Adalimumab 40 MG/0.8ML Prefilled Syringe Kit Inject 0.8 mL as instructed every 14 days. 6 Each 1   • acetaminophen (TYLENOL) 325 MG Tab Take 650 mg by mouth at bedtime as needed.     • folic acid (FOLVITE) 400 MCG tablet Take 400 mcg by mouth every day.     • valsartan-hydrochlorothiazide (DIOVAN HCT) 80-12.5 MG per tablet Take 0.5 Tabs by mouth every day.     • vitamin D (CHOLECALCIFEROL) 1000 UNIT Tab Take 1,000 Units by mouth every day.     • atorvastatin (LIPITOR) 20 MG Tab Take 20 mg by mouth every evening.     • metformin (GLUCOPHAGE) 500 MG Tab Take 500 mg by mouth every day.     • POTASSIUM GLUCONATE Take 1 Tab by mouth every day.     • magnesium oxide (MAG-OX) 400 MG TABS Take 400 mg by mouth 2 times a day.     • Calcium Carbonate-Vitamin D (CALCIUM + D PO) Take 1 Tab by mouth every day.     • Misc Natural Products (MIDNITE PM PO) Take  by mouth.     • omeprazole (PRILOSEC) 20 MG CPDR Take 20 mg by mouth every day.       No current facility-administered medications for this visit.     She  has a past medical history of Prediabetes; Hyperlipidemia; Hypertension; Rheumatoid arthritis with rheumatoid factor (CMS-[facility]); Hiatus hernia syndrome; Cancer (CMS-HCC); Chronic back pain greater than 3 months duration; and Blood clotting disorder (CMS-HCC) (2012).    ROS   Other than what is mentioned in HPI or physical exam, there is no history of headaches, double vision or blurred vision. No temporal tenderness or jaw claudication. No history of cataracts or glaucoma. No trouble swallowing difficulties or sore throats. No history  of thyroid disease. No chest complaints including chest pain, cough or sputum production. No shortness of breath. No GI complaints including nausea, vomiting, change in bowel habits, or past peptic ulcer disease. No history of blood in the stools. No urinary complaints including dysuria or frequency. No history of rash including psoriasis. No history of alopecia, photosensitivity, oral ulcerations, Raynaud's phenomena, or swollen joints. No history of gout. No back complaints. No history of low blood counts.       Objective:     Blood pressure 120/60, pulse 76, temperature 36.7 °C (98.1 °F), resp. rate 12, weight 73.483 kg (162 lb), SpO2 96 %. Body mass index is 28.7 kg/(m^2).   Physical Exam:  Constitutional: Alert and oriented X3,.Skin: Warm, dry, good turgor, no rashes in visible areas, multiple areas of resection of skin cancersEye: Equal, round and reactive, conjunctiva clear, lids normal EOM intact , mild arcus senilis bilaterallyENMT: Lips without lesions, good dentition, no oropharyngeal ulcers, moist buccal mucosa, pinna without deformityNeck: Trachea midline, no masses, no thyromegaly.Lymph:  No cervical lymphadenopathy, no axillary lymphadenopathy, no supraclavicular lymphadenopathyRespiratory: Unlabored respiratory effort, lungs clear to auscultation, no wheezes, no ronchi.Cardiovascular: Normal S1, S2, patient has a 3/6 systolic ejection murmur heard both at the right and left sternal borders, no edema.Abdomen: Soft, non-tender, no masses, no hepatosplenomegaly.Psych: Alert and oriented x3, normal affect and mood.Neuro: Cranial nerves 2-12 are grossly intact, no loss of sensation LEExt:no joint laxity noted in bilateral arms, no joint laxity noted in bilateral legs, gait without antalgia and without foot drop, there is evidence of Heberden's nodes especially in the index DIP joint, no christin swan-neck or boutonniere deformities, no ulnar deviation, no flexure contractures in the elbows, knees with  crepitus bilaterally but no effusions, no Achilles tendon inflammation, some mild synovitis noted on the right middle PIP joint, and left index PIP joint  Lab Results   Component Value Date/Time    QUANTIFERON TB GOLD Negative 06/29/2016 02:03 PM     Lab Results   Component Value Date/Time    HEPATITIS B CORS AB,IGM Negative 06/29/2016 02:03 PM    HEPATITIS B SURFACE ANTIGEN Negative 06/29/2016 02:03 PM     Lab Results   Component Value Date/Time    SODIUM 143 02/28/2017 06:37 AM    POTASSIUM 4.0 02/28/2017 06:37 AM    CHLORIDE 107 02/28/2017 06:37 AM    CO2 30 02/28/2017 06:37 AM    GLUCOSE 86 02/28/2017 06:37 AM    BUN 27* 02/28/2017 06:37 AM    CREATININE 0.70 02/28/2017 06:37 AM      Lab Results   Component Value Date/Time    WBC 5.6 02/28/2017 06:37 AM    RBC 4.69 02/28/2017 06:37 AM    HEMOGLOBIN 14.5 02/28/2017 06:37 AM    HEMATOCRIT 45.4 02/28/2017 06:37 AM    MCV 96.8 02/28/2017 06:37 AM    MCH 30.9 02/28/2017 06:37 AM    MCHC 31.9* 02/28/2017 06:37 AM    MPV 12.1 02/28/2017 06:37 AM    NEUTROPHILS-POLYS 53.30 02/28/2017 06:37 AM    LYMPHOCYTES 34.30 02/28/2017 06:37 AM    MONOCYTES 7.90 02/28/2017 06:37 AM    EOSINOPHILS 3.20 02/28/2017 06:37 AM    BASOPHILS 1.10 02/28/2017 06:37 AM      Lab Results   Component Value Date/Time    CALCIUM 9.8 02/28/2017 06:37 AM    AST(SGOT) 19 02/28/2017 06:37 AM    ALT(SGPT) 18 02/28/2017 06:37 AM    ALKALINE PHOSPHATASE 53 02/28/2017 06:37 AM    TOTAL BILIRUBIN 1.2 02/28/2017 06:37 AM    ALBUMIN 4.2 02/28/2017 06:37 AM    TOTAL PROTEIN 7.1 02/28/2017 06:37 AM     Lab Results   Component Value Date/Time    URIC ACID 4.5 06/29/2016 02:03 PM    RHEUMATOID FACTOR -NEPH- <10 06/29/2016 02:03 PM    CCP ANTIBODIES 4 06/29/2016 02:03 PM     Lab Results   Component Value Date/Time    SED RATE WESTERGREN 11 12/01/2016 11:18 AM     Lab Results   Component Value Date/Time    GLYCOHEMOGLOBIN 5.5 05/16/2016 07:50 AM     Lab Results   Component Value Date/Time    CPK TOTAL 61  06/29/2016 02:03 PM     Results for orders placed during the hospital encounter of 03/18/16   DX-JOINT SURVEY-HANDS SINGLE VIEW    Impression Multiple bilateral sites of osteoarthritis     Results for orders placed during the hospital encounter of 03/18/16   DX-JOINT SURVEY-FEET SINGLE VIEW    Impression 1.  Bilateral midfoot and forefoot osteoarthritis    2.  Bilateral bunion    3.  Prior fusion across the right 2nd proximal interphalangeal joint    4.  Foreign body or opaque material between 1st and 2nd phalanges     Results for orders placed during the hospital encounter of 03/18/16   DS-BONE DENSITY STUDY (DEXA)    Impression According to the World Health Organization classification, bone mineral density of this patient is osteopenia with increased risk of fracture.        10-year Probability of Fracture:  Major Osteoporotic     14.3%  Hip     3.7%  Population      [place] ()    Based on left femur neck BMD          INTERPRETING LOCATION:  [address] [place] [address], [address]     Results for orders placed during the hospital encounter of 01/14/09   DX-KNEE COMPLETE 4+    Impression IMPRESSION:     MILD PATELLOFEMORAL AND MEDIAL FEMOROTIBIAL COMPARTMENT DEGENERATIVE   OSTEOARTHROSIS.        GEK:leonie     Read By ALEX WISE MD on Jan 14 2009 10:01AM  : DANA Transcription Date: Jovi 15 2009  8:11AM  THIS DOCUMENT HAS BEEN ELECTRONICALLY SIGNED BY: ALEX WISE MD on   Jan 16 2009  1:32PM        Results for orders placed during the hospital encounter of 01/14/09   DX-SHOULDER 2+    Impression IMPRESSION:     NORMAL RADIOGRAPHS OF THE LEFT SHOULDER WITH NOTE MADE OF MINIMAL   DEGENERATIVE OSTEOARTHROSIS OF THE GLENOHUMERAL JOINT WITH MINIMAL   SPURRING.           Results for orders placed in visit on 03/18/14   MR-LUMBAR SPINE-W/O    Impression 1.  There is partial sacralization of the fifth lumbar vertebra. For the purpose of this report, the partially sacralized fifth lumbar vertebra is  considered as L5.  2.  Mild degenerative disease as described above.  3.  There has been no significant interval change.     Results for orders placed during the hospital encounter of 01/14/09   DX-CERVICAL SPINE-2 OR 3 VIEWS    Impression IMPRESSION:     DEGENERATIVE DISC AND FACET ARTHROPATHY C5-6, C6-7, AND DEGENERATIVE   FACET ARTHROPATHY AT C7-T1.           Assessment and Plan:     1. Rheumatoid arthritis, involving unspecified site, unspecified rheumatoid factor presence (CMS-HCC)  Continue Humira 40 mg subcutaneous every 2 weeks and methotrexate 15 mg by mouth every week, patient is fully aware of the skin cancer risk with both these medications, patient did have a history of melanoma about 20 years ago or more and is aware of the possible recurrence with the Humira but patient wants to continue the Humira and methotrexate. We'll do labs every 3 months, labs ordered for the patient  - REFERRAL TO PAIN CLINIC  - lidocaine (LIDODERM) 5 % Patch; Apply 1 Patch to skin as directed every 24 hours.  Dispense: 90 Patch; Refill: 1  - CBC WITH DIFFERENTIAL; Future  - COMP METABOLIC PANEL; Future  - WESTERGREN SED RATE; Future    2. Osteopenia  Last DEXA March 2016, next DEXA March 2018  recommend to continue calcium about 1200 mg by mouth daily, vitamin D about 1000 units by mouth daily and magnesium 400 mg by mouth daily.  - lidocaine (LIDODERM) 5 % Patch; Apply 1 Patch to skin as directed every 24 hours.  Dispense: 90 Patch; Refill: 1  - CBC WITH DIFFERENTIAL; Future  - COMP METABOLIC PANEL; Future  - WESTERGREN SED RATE; Future    3. Chronic bilateral low back pain without sciatica  Chronic problem for the patient, x-rays indicate DJD, status post conservative treatment without benefit, will refer to pain clinic/spine clinic for possible epidurals also do a trial of Lidoderm patches  - REFERRAL TO PAIN CLINIC  - lidocaine (LIDODERM) 5 % Patch; Apply 1 Patch to skin as directed every 24 hours.  Dispense: 90 Patch;  Refill: 1    4. Long term current use of anticoagulants with INR goal of 2.0-3.0  This will impact with kind of medications we can use for this patient's arthritis, NSAIDs are contraindicated because of increased risk of bleeding while on chronic anticoagulation    5. Paroxysmal atrial fibrillation (CMS-[facility])  Patients on chronic anticoagulation for such    6. Essential hypertension, benign  May impact the type of medications we can use for this patient's arthritis. We will have to keep this under advisement.    7. Hyperglycemia  High blood sugar can exacerbate inflammatory state of patient's arthritis, discussed with patient the need to monitor and control blood sugars, patient states understanding    Followup: Return in about 3 months (around 6/16/2017). or sooner prn    Patient was seen 30 minutes face-to-face of which more than 50% of the time was spent counseling the patient (excluding time for procedures)  regarding  rheumatological condition and care. Therapy was discussed in detail.    Please note that this dictation was created using voice recognition software. I have made every reasonable attempt to correct obvious errors, but I expect that there are errors of grammar and possibly content that I did not discover before finalizing the note.               RF neg 3/2014 [facility]; RF neg 6/2014 [facility]  CCP neg 3/2014 [facility]; CCP neg 6/2014 [facility]  Uric acid 4.7 3/2014 [facility]  Quantiferon Gold neg 6/2014 [facility]

## 2017-03-16 NOTE — MR AVS SNAPSHOT
Marry Mathur   3/16/2017 1:00 PM   Office Visit   MRN: [id number]    Department:  Rheumatology [facility]   Dept Phone:  [phone]    Description:  Female : 1935   Provider:  Deanna Escoto M.D.           Reason for Visit     Follow-Up           Allergies as of 3/16/2017     Allergen Noted Reactions    Sulfa Drugs 2012   Vomiting    RXN=young person      You were diagnosed with     Rheumatoid arthritis, involving unspecified site, unspecified rheumatoid factor presence (CMS-HCC)   [5444569]       Osteopenia   [129933]       Chronic bilateral low back pain without sciatica   [0048853]       Long term current use of anticoagulants with INR goal of 2.0-3.0   [072625]       Paroxysmal atrial fibrillation (CMS-HCC)   [863856]       Essential hypertension, benign   [401.1.ICD-9-CM]       Hyperglycemia   [671761]         Vital Signs     Blood Pressure Pulse Temperature Respirations Weight Oxygen Saturation    120/60 mmHg 76 36.7 °C (98.1 °F) 12 73.483 kg (162 lb) 96%    Smoking Status                   Never Smoker            Basic Information     Date Of Birth Sex Race Ethnicity Preferred Language    1935 Female White Non- English      Your appointments     2017  1:00 PM   Follow Up Visit with Deanna Escoto M.D.   [facility] (--)    [address] [place] [address] [id number] [phone]           You will be receiving a confirmation call a few days before your appointment from our automated call confirmation system.              Problem List              ICD-10-CM Priority Class Noted - Resolved    Nonspecific abnormal electrocardiogram (ECG) (EKG) R94.31 High  2012 - Present    Right bundle branch block I45.10 High  2012 - Present    Essential hypertension, benign I10 High  2012 - Present    Other and unspecified hyperlipidemia E78.5 High  2012 - Present    Hyperglycemia R73.9 High  2012 - Present    GERD  (gastroesophageal reflux disease) K21.9 High  6/27/2012 - Present    Back pain M54.9 High  6/27/2012 - Present    Long term current use of anticoagulants with INR goal of 2.0-3.0 Z79.01   3/8/2016 - Present    Deep venous thrombosis, left I82.402   3/8/2016 - Present    Rheumatoid arthritis (CMS-[facility]) M06.9   3/14/2016 - Present    Atrial fibrillation [I48.91] I48.91   4/19/2016 - Present    Hernia, inguinal, right K40.90   9/23/2016 - Present      Health Maintenance        Date Due Completion Dates    IMM DTaP/Tdap/Td Vaccine (1 - Tdap) 8/27/1955 ---    COLONOSCOPY 8/27/1986 ---    IMM ZOSTER VACCINE 8/27/1996 ---    IMM PNEUMOCOCCAL 65+ (ADULT) LOW/MEDIUM RISK SERIES (2 of 2 - PPSV23) 3/8/2017 3/8/2016    MAMMOGRAM 3/18/2017 3/18/2016, 3/10/2016, 10/9/2006, 10/7/2005, 10/5/2004    BONE DENSITY 3/18/2021 3/18/2016            Current Immunizations     13-VALENT PCV PREVNAR 3/8/2016    Influenza Vaccine Adult HD 11/1/2016      Below and/or attached are the medications your provider expects you to take. Review all of your home medications and newly ordered medications with your provider and/or pharmacist. Follow medication instructions as directed by your provider and/or pharmacist. Please keep your medication list with you and share with your provider. Update the information when medications are discontinued, doses are changed, or new medications (including over-the-counter products) are added; and carry medication information at all times in the event of emergency situations     Allergies:  SULFA DRUGS - Vomiting               Medications  Valid as of: March 16, 2017 -  1:33 PM    Generic Name Brand Name Tablet Size Instructions for use    Acetaminophen (Tab) TYLENOL 325 MG Take 650 mg by mouth at bedtime as needed.        Adalimumab (Prefilled Syringe Kit) Adalimumab 40 MG/0.8ML Inject 0.8 mL as instructed every 14 days.        Atorvastatin Calcium (Tab) LIPITOR 20 MG Take 20 mg by mouth every evening.         Calcium Citrate-Vitamin D   Take 1 Tab by mouth every day.        Cholecalciferol (Tab) cholecalciferol 1000 UNIT Take 1,000 Units by mouth every day.        Folic Acid (Tab) FOLVITE 400 MCG Take 400 mcg by mouth every day.        Hydrocodone-Acetaminophen (Tab) NORCO 5-325 MG 1 tab po qhs for severe joint pain, NO ALCOHOL and NO DRIVING within 24 hrs of taking this medication, NO BENZODIAZEPINE medications, no selling or giving to any other persons        Lidocaine (Patch) LIDODERM 5 % Apply 1 Patch to skin as directed every 24 hours.        Magnesium Oxide (Tab) MAG- MG Take 400 mg by mouth 2 times a day.        MetFORMIN HCl (Tab) GLUCOPHAGE 500 MG Take 500 mg by mouth every day.        Methotrexate Sodium (Tab) methotrexate 2.5 MG 6 tabs po q wednesday        Misc Natural Products   Take  by mouth.        Omeprazole (CAPSULE DELAYED RELEASE) PRILOSEC 20 MG Take 20 mg by mouth every day.        Potassium Gluconate   Take 1 Tab by mouth every day.        Pregabalin (Cap) LYRICA 75 MG Take 1 Cap by mouth 2 times a day.        Valsartan-Hydrochlorothiazide (Tab) DIOVAN-HCT 80-12.5 MG Take 0.5 Tabs by mouth every day.        Warfarin Sodium (Tab) COUMADIN 2.5 MG Take 1 to 2 tablets by mouth daily as directed by the coumadin clinic        .                 Medicines prescribed today were sent to:     [facility] - [address] [address]    Phone: [phone] Fax: [phone]    Open 24 Hours?: No    [facility] [place] - [address] AT [facility]    [address] [address], [place] code [address] [facility] [address]    Phone: [phone] Fax: [phone]    Open 24 Hours?: No    [id number] - [place], [place] - [address] [address]    Phone: [phone] Fax: [phone]    Open 24 Hours?: No      Medication refill instructions:       If your prescription  bottle indicates you have medication refills left, it is not necessary to call your provider’s office. Please contact your pharmacy and they will refill your medication.    If your prescription bottle indicates you do not have any refills left, you may request refills at any time through one of the following ways: The online [employer] system (except Urgent Care), by calling your provider’s office, or by asking your pharmacy to contact your provider’s office with a refill request. Medication refills are processed only during regular business hours and may not be available until the next business day. Your provider may request additional information or to have a follow-up visit with you prior to refilling your medication.   *Please Note: Medication refills are assigned a new Rx number when refilled electronically. Your pharmacy may indicate that no refills were authorized even though a new prescription for the same medication is available at the pharmacy. Please request the medicine by name with the pharmacy before contacting your provider for a refill.        Your To Do List     Future Labs/Procedures Complete By Expires    CBC WITH DIFFERENTIAL  As directed 3/16/2018    COMP METABOLIC PANEL  As directed 3/16/2018    WESTERGREN SED RATE  As directed 3/16/2018      Referral     A referral request has been sent to our patient care coordination department. Please allow 3-5 business days for us to process this request and contact you either by phone or mail. If you do not hear from us by the 5th business day, please call us at [phone].           [employer] Access Code: Activation code not generated  Current [employer] Status: Active

## 2017-03-16 NOTE — Clinical Note
[facility]   [address], [address]  [place] [address]  Phone: [phone]  Fax: [phone]              Encounter Date: 3/16/2017    Dear Dr. Eaton ref. provider found,    It was a pleasure seeing your patient, Marry Mathur, on 3/16/2017. Diagnoses of Rheumatoid arthritis, involving unspecified site, unspecified rheumatoid factor presence (CMS-HCC), Osteopenia, Chronic bilateral low back pain without sciatica, Long term current use of anticoagulants with INR goal of 2.0-3.0, Paroxysmal atrial fibrillation (CMS-[facility]), Essential hypertension, benign, and Hyperglycemia were pertinent to this visit.     Please find attached progress note which includes the history I obtained from Ms. Mathur, my physical examination findings, my impression and recommendations.      Once again, it was a pleasure participating in your patient's care.  Please feel free to contact me if you have any questions or if I can be of any further assistance to your patients.      Sincerely,    Deanna Escoto M.D.  Electronically Signed          PROGRESS NOTE:  No notes on file

## 2017-03-17 ENCOUNTER — TELEPHONE (OUTPATIENT)
Dept: VASCULAR LAB | Facility: MEDICAL CENTER | Age: 82
End: 2017-03-17

## 2017-04-04 ENCOUNTER — ANTICOAGULATION MONITORING (OUTPATIENT)
Dept: VASCULAR LAB | Facility: MEDICAL CENTER | Age: 82
End: 2017-04-04

## 2017-04-04 DIAGNOSIS — Z79.01 LONG TERM CURRENT USE OF ANTICOAGULANTS WITH INR GOAL OF 2.0-3.0: ICD-10-CM

## 2017-04-04 DIAGNOSIS — I48.91 ATRIAL FIBRILLATION, UNSPECIFIED TYPE (HCC): ICD-10-CM

## 2017-04-04 LAB — INR PPP: 4.5 (ref 2–3.5)

## 2017-04-04 NOTE — PROGRESS NOTES
Anticoagulation Summary as of 4/4/2017     INR goal 2.0-3.0   Selected INR 4.5! (4/4/2017)   Maintenance plan 2.5 mg (2.5 mg x 1) on Mon, Fri; 5 mg (2.5 mg x 2) all other days   Weekly total 30 mg   Plan last modified Toney Spicer, PHARMD (12/5/2016)   Next INR check 4/11/2017   Target end date     Indications   Long term current use of anticoagulants with INR goal of 2.0-3.0 [Z79.01]  Deep venous thrombosis  left [I82.402]  Atrial fibrillation [I48.91] [I48.91]         Anticoagulation Episode Summary     INR check location Home Draw    Preferred lab     Send INR reminders to     Comments       Anticoagulation Care Providers     Provider Role Specialty Phone number    CELIO Baeza. Referring Family Medicine [phone]    JUDY GallegosD Responsible          Anticoagulation Patient Findings    Left message for patient to HOLD warfarin x 2 doses. Follow up INR in one week.    Jose Angel Wong, PHARMD

## 2017-04-05 ENCOUNTER — HOSPITAL ENCOUNTER (OUTPATIENT)
Dept: RADIOLOGY | Facility: MEDICAL CENTER | Age: 82
End: 2017-04-05
Attending: NURSE PRACTITIONER
Payer: MEDICARE

## 2017-04-05 ENCOUNTER — OFFICE VISIT (OUTPATIENT)
Dept: MEDICAL GROUP | Facility: PHYSICIAN GROUP | Age: 82
End: 2017-04-05
Payer: MEDICARE

## 2017-04-05 VITALS
SYSTOLIC BLOOD PRESSURE: 120 MMHG | OXYGEN SATURATION: 95 % | BODY MASS INDEX: 28 KG/M2 | HEART RATE: 75 BPM | WEIGHT: 158 LBS | TEMPERATURE: 98.6 F | DIASTOLIC BLOOD PRESSURE: 78 MMHG | HEIGHT: 63 IN | RESPIRATION RATE: 12 BRPM

## 2017-04-05 DIAGNOSIS — S39.92XA TAILBONE INJURY, INITIAL ENCOUNTER: ICD-10-CM
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
## 2017-04-05 DIAGNOSIS — S39.92XA TAILBONE INJURY, INITIAL ENCOUNTER: Primary | ICD-10-CM

## 2017-04-05 PROCEDURE — G8417 CALC BMI ABV UP PARAM F/U: HCPCS | Performed by: NURSE PRACTITIONER

## 2017-04-05 PROCEDURE — G8432 DEP SCR NOT DOC, RNG: HCPCS | Performed by: NURSE PRACTITIONER

## 2017-04-05 PROCEDURE — 99213 OFFICE O/P EST LOW 20 MIN: CPT | Performed by: NURSE PRACTITIONER

## 2017-04-05 PROCEDURE — 1101F PT FALLS ASSESS-DOCD LE1/YR: CPT | Performed by: NURSE PRACTITIONER

## 2017-04-05 PROCEDURE — 1036F TOBACCO NON-USER: CPT | Performed by: NURSE PRACTITIONER

## 2017-04-05 PROCEDURE — 72220 X-RAY EXAM SACRUM TAILBONE: CPT

## 2017-04-05 PROCEDURE — 4040F PNEUMOC VAC/ADMIN/RCVD: CPT | Performed by: NURSE PRACTITIONER

## 2017-04-05 ASSESSMENT — PAIN SCALES - GENERAL: PAINLEVEL: 5=MODERATE PAIN

## 2017-04-05 ASSESSMENT — PATIENT HEALTH QUESTIONNAIRE - PHQ9: CLINICAL INTERPRETATION OF PHQ2 SCORE: 0

## 2017-04-05 NOTE — MR AVS SNAPSHOT
"        Marry Mathur   2017 3:20 PM   Office Visit   MRN: [id number]    Department:  [facility]   Dept Phone:  [phone]    Description:  Female : 1935   Provider:  ELBA Baeza           Reason for Visit     Other tailbone, x 2 weeks      Allergies as of 2017     Allergen Noted Reactions    Sulfa Drugs 2012   Vomiting    RXN=young person      You were diagnosed with     Tailbone injury, initial encounter   [084474]  -  Primary     Fall from ground level   [7843687]         Vital Signs     Blood Pressure Pulse Temperature Respirations Height Weight    120/78 mmHg 75 37 °C (98.6 °F) 12 1.6 m (5' 2.99\") 71.668 kg (158 lb)    Body Mass Index Oxygen Saturation Smoking Status             28.00 kg/m2 95% Never Smoker          Basic Information     Date Of Birth Sex Race Ethnicity Preferred Language    1935 Female White Non- English      Your appointments     2017  1:00 PM   Follow Up Visit with Deanna Escoto M.D.   [facility] (--)    [address], [address]  [facility] [id number] [phone]           You will be receiving a confirmation call a few days before your appointment from our automated call confirmation system.              Problem List              ICD-10-CM Priority Class Noted - Resolved    Nonspecific abnormal electrocardiogram (ECG) (EKG) R94.31 High  2012 - Present    Right bundle branch block I45.10 High  2012 - Present    Essential hypertension, benign I10 High  2012 - Present    Other and unspecified hyperlipidemia E78.5 High  2012 - Present    Hyperglycemia R73.9 High  2012 - Present    GERD (gastroesophageal reflux disease) K21.9 High  2012 - Present    Back pain M54.9 High  2012 - Present    Long term current use of anticoagulants with INR goal of 2.0-3.0 Z79.01   3/8/2016 - Present    Deep venous thrombosis, left I82.402   3/8/2016 - Present    Rheumatoid arthritis " (CMS-[facility]) M06.9   3/14/2016 - Present    Atrial fibrillation [I48.91] I48.91   4/19/2016 - Present    Hernia, inguinal, right K40.90   9/23/2016 - Present      Health Maintenance        Date Due Completion Dates    IMM DTaP/Tdap/Td Vaccine (1 - Tdap) 8/27/1955 ---    COLONOSCOPY 8/27/1986 ---    IMM ZOSTER VACCINE 8/27/1996 ---    IMM PNEUMOCOCCAL 65+ (ADULT) LOW/MEDIUM RISK SERIES (2 of 2 - PPSV23) 3/8/2017 3/8/2016    MAMMOGRAM 3/18/2017 3/18/2016, 3/10/2016, 10/9/2006, 10/7/2005, 10/5/2004    BONE DENSITY 3/18/2021 3/18/2016            Current Immunizations     13-VALENT PCV PREVNAR 3/8/2016    Influenza Vaccine Adult HD 11/1/2016      Below and/or attached are the medications your provider expects you to take. Review all of your home medications and newly ordered medications with your provider and/or pharmacist. Follow medication instructions as directed by your provider and/or pharmacist. Please keep your medication list with you and share with your provider. Update the information when medications are discontinued, doses are changed, or new medications (including over-the-counter products) are added; and carry medication information at all times in the event of emergency situations     Allergies:  SULFA DRUGS - Vomiting               Medications  Valid as of: April 05, 2017 -  3:48 PM    Generic Name Brand Name Tablet Size Instructions for use    Acetaminophen (Tab) TYLENOL 325 MG Take 650 mg by mouth at bedtime as needed.        Adalimumab (Prefilled Syringe Kit) Adalimumab 40 MG/0.8ML Inject 0.8 mL as instructed every 14 days.        Atorvastatin Calcium (Tab) LIPITOR 20 MG Take 20 mg by mouth every evening.        Calcium Citrate-Vitamin D   Take 1 Tab by mouth every day.        Cholecalciferol (Tab) cholecalciferol 1000 UNIT Take 1,000 Units by mouth every day.        Folic Acid (Tab) FOLVITE 400 MCG Take 400 mcg by mouth every day.        Hydrocodone-Acetaminophen (Tab) NORCO 5-325 MG 1 tab po qhs for  severe joint pain, NO ALCOHOL and NO DRIVING within 24 hrs of taking this medication, NO BENZODIAZEPINE medications, no selling or giving to any other persons        Lidocaine (Patch) LIDODERM 5 % Apply 1 Patch to skin as directed every 24 hours.        Magnesium Oxide (Tab) MAG- MG Take 400 mg by mouth 2 times a day.        MetFORMIN HCl (Tab) GLUCOPHAGE 500 MG Take 500 mg by mouth every day.        Methotrexate Sodium (Tab) methotrexate 2.5 MG 6 tabs po q wednesday        Misc Natural Products   Take  by mouth.        Omeprazole (CAPSULE DELAYED RELEASE) PRILOSEC 20 MG Take 20 mg by mouth every day.        Potassium Gluconate   Take 1 Tab by mouth every day.        Pregabalin (Cap) LYRICA 75 MG Take 1 Cap by mouth 2 times a day.        Valsartan-Hydrochlorothiazide (Tab) DIOVAN-HCT 80-12.5 MG Take 0.5 Tabs by mouth every day.        Warfarin Sodium (Tab) COUMADIN 2.5 MG Take 1 to 2 tablets by mouth daily as directed by the coumadin clinic        .                 Medicines prescribed today were sent to:     [facility] - [address] [address]    Phone: [phone] Fax: [phone]    Open 24 Hours?: No    [facility] [address] AT [facility]    [address]  [address] [address], [place] code [address] [facility] [address]    Phone: [phone] Fax: [phone]    Open 24 Hours?: No    [id number] - [place], [place] - [id number] [facility] [address]    Phone: [phone] Fax: [phone]    Open 24 Hours?: No      Medication refill instructions:       If your prescription bottle indicates you have medication refills left, it is not necessary to call your provider’s office. Please contact your pharmacy and they will refill your medication.    If your prescription bottle indicates you do not have any refills left, you may request refills at any time through one of the  following ways: The online [facility] system (except Urgent Care), by calling your provider’s office, or by asking your pharmacy to contact your provider’s office with a refill request. Medication refills are processed only during regular business hours and may not be available until the next business day. Your provider may request additional information or to have a follow-up visit with you prior to refilling your medication.   *Please Note: Medication refills are assigned a new Rx number when refilled electronically. Your pharmacy may indicate that no refills were authorized even though a new prescription for the same medication is available at the pharmacy. Please request the medicine by name with the pharmacy before contacting your provider for a refill.        Your To Do List     Future Labs/Procedures Complete By Expires    DX-SACRUM AND COCCYX 2+  As directed 4/5/2018         [facility] Access Code: Activation code not generated  Current [facility] Status: Active

## 2017-04-06 NOTE — PROGRESS NOTES
Chief Complaint   Patient presents with   • Other     tailbone, x 2 weeks       HISTORY OF PRESENT ILLNESS: Patient is a 81 y.o. female established patient who presents today to discuss the followin. Tailbone injury, initial encounter 2. Fall from ground level  Patient injured her tailbone approximately 2 weeks ago.  She states that she was at physical therapy and was getting ready to do some repetitions on a weight machine, when she fell off of the exercise ball that she was sitting on and landed on her tailbone.  She reports significant discomfort that has not improved.  She has tried multiple remedies including Tylenol, ibuprofen, heat, ice and different donut pillows.  She states that she is most comfortable when  laying flat on her back.  She denies any personal history of fragility fractures.    Allergies:Sulfa drugs    Current Outpatient Prescriptions Ordered in [facility]   Medication Sig Dispense Refill   • lidocaine (LIDODERM) 5 % Patch Apply 1 Patch to skin as directed every 24 hours. 90 Patch 1   • hydrocodone-acetaminophen (NORCO) 5-325 MG Tab per tablet 1 tab po qhs for severe joint pain, NO ALCOHOL and NO DRIVING within 24 hrs of taking this medication, NO BENZODIAZEPINE medications, no selling or giving to any other persons 30 Tab 0   • warfarin (COUMADIN) 2.5 MG Tab Take 1 to 2 tablets by mouth daily as directed by the coumadin clinic 180 Tab 1   • pregabalin (LYRICA) 75 MG Cap Take 1 Cap by mouth 2 times a day. 180 Cap 1   • methotrexate 2.5 MG Tab 6 tabs po q wednesday 72 Tab 0   • Adalimumab 40 MG/0.8ML Prefilled Syringe Kit Inject 0.8 mL as instructed every 14 days. 6 Each 1   • Misc Natural Products (MIDNITE PM PO) Take  by mouth.     • acetaminophen (TYLENOL) 325 MG Tab Take 650 mg by mouth at bedtime as needed.     • folic acid (FOLVITE) 400 MCG tablet Take 400 mcg by mouth every day.     • valsartan-hydrochlorothiazide (DIOVAN HCT) 80-12.5 MG per tablet Take 0.5 Tabs by mouth every day.      • vitamin D (CHOLECALCIFEROL) 1000 UNIT Tab Take 1,000 Units by mouth every day.     • atorvastatin (LIPITOR) 20 MG Tab Take 20 mg by mouth every evening.     • metformin (GLUCOPHAGE) 500 MG Tab Take 500 mg by mouth every day.     • omeprazole (PRILOSEC) 20 MG CPDR Take 20 mg by mouth every day.     • POTASSIUM GLUCONATE Take 1 Tab by mouth every day.     • magnesium oxide (MAG-OX) 400 MG TABS Take 400 mg by mouth 2 times a day.     • Calcium Carbonate-Vitamin D (CALCIUM + D PO) Take 1 Tab by mouth every day.       No current [facility]-ordered facility-administered medications on file.       Past Medical History   Diagnosis Date   • Prediabetes    • Hyperlipidemia    • Hypertension    • Rheumatoid arthritis with rheumatoid factor (CMS-HCC)      Dr. Escoto   • Hiatus hernia syndrome    • Cancer (CMS-HCC)      skin   • Chronic back pain greater than 3 months duration    • Blood clotting disorder (CMS-HCC) 2012     clot in leg       Social History   Substance Use Topics   • Smoking status: Never Smoker    • Smokeless tobacco: Never Used   • Alcohol Use: No       Family Status   Relation Status Death Age   • Mother     • Father       Family History   Problem Relation Age of Onset   • Cancer Mother      stomach   • Cancer Father      colon       ROS: see above    Review of Systems   Constitutional: Negative for fever, chills, weight loss and malaise/fatigue.   HENT: Negative for ear pain, nosebleeds, congestion, sore throat and neck pain.    Eyes: Negative for blurred vision.   Respiratory: Negative for cough, sputum production, shortness of breath and wheezing.    Cardiovascular: Negative for chest pain, palpitations, orthopnea and leg swelling.   Gastrointestinal: Negative for heartburn, nausea, vomiting and abdominal pain.   Genitourinary: Negative for dysuria, urgency and frequency.   Musculoskeletal: Negative for myalgias, back pain and joint pain.   Skin: Negative for rash and itching.  "  Neurological: Negative for dizziness, tingling, tremors, sensory change, focal weakness and headaches.   Endo/Heme/Allergies: Does not bruise/bleed easily.   Psychiatric/Behavioral: Negative for depression, suicidal ideas and memory loss.  The patient is not nervous/anxious and does not have insomnia.        Exam:  Blood pressure 120/78, pulse 75, temperature 37 °C (98.6 °F), resp. rate 12, height 1.6 m (5' 2.99\"), weight 71.668 kg (158 lb), SpO2 95 %.  General:  Well nourished, well developed female in NAD  Head is grossly normal.  Neck: Thyroid is not enlarged.  Pulmonary: Normal effort.   Cardiovascular: Regular rate.   Extremities: no clubbing, cyanosis, or edema.  Large bruise with subtle swelling of the sacrum.  No deformity.  There is tenderness to the touch.  Gait is intact, no assistive device use for ambulation.  Psych:  Mood and affect are normal.  Answering questions appropriately with good eye contact.      Please note that this dictation was created using voice recognition software. I have made every reasonable attempt to correct obvious errors, but I expect that there are errors of grammar and possibly content that I did not discover before finalizing the note.    Assessment/Plan:    1. Tailbone injury, initial encounter  DX-SACRUM AND COCCYX 2+   2. Fall from ground level  DX-SACRUM AND COCCYX 2+        1.  X-ray today to rule out fracture.  2.  Encourage patient to purchase a larger doughnut to alleviate the pressure on the tailbone.  3.  Pain will likely be present for 6-8 weeks.    "

## 2017-04-10 DIAGNOSIS — M06.9 RHEUMATOID ARTHRITIS, INVOLVING UNSPECIFIED SITE, UNSPECIFIED RHEUMATOID FACTOR PRESENCE: ICD-10-CM

## 2017-04-10 DIAGNOSIS — Z79.01 LONG TERM CURRENT USE OF ANTICOAGULANTS WITH INR GOAL OF 2.0-3.0: ICD-10-CM

## 2017-04-10 RX ORDER — HYDROCODONE BITARTRATE AND ACETAMINOPHEN 5; 325 MG/1; MG/1
TABLET ORAL
Qty: 30 TAB | Refills: 0 | Status: CANCELLED | OUTPATIENT
Start: 2017-04-10

## 2017-04-10 RX ORDER — HYDROCODONE BITARTRATE AND ACETAMINOPHEN 5; 325 MG/1; MG/1
TABLET ORAL
Qty: 30 TAB | Refills: 0 | Status: SHIPPED | OUTPATIENT
Start: 2017-04-10 | End: 2017-05-04 | Stop reason: SDUPTHER

## 2017-04-10 NOTE — TELEPHONE ENCOUNTER
Was the patient seen in the last year in this department? Yes   Feb labs    Does patient have an active prescription for medications requested? No     Received Request Via: Pharmacy    Pt was wondering if she could get a 90 day script on the Norco as well, to save her from having to come by monthly to  the script? Please Advise    She would like to  the Norco this afternoon.   Thank you

## 2017-04-17 ENCOUNTER — TELEPHONE (OUTPATIENT)
Dept: RHEUMATOLOGY | Facility: PHYSICIAN GROUP | Age: 82
End: 2017-04-17

## 2017-04-17 DIAGNOSIS — M54.50 CHRONIC BILATERAL LOW BACK PAIN WITHOUT SCIATICA: ICD-10-CM

## 2017-04-17 DIAGNOSIS — G89.29 CHRONIC BILATERAL LOW BACK PAIN WITHOUT SCIATICA: ICD-10-CM

## 2017-04-17 LAB — INR PPP: 2.9 (ref 2–3.5)

## 2017-04-17 NOTE — TELEPHONE ENCOUNTER
You gave her a referral to [facility].  She was wondering if you would change the referral to DR Cooper with [facility] as she wants to see that doctor.   Thank you    The phone number is [phone]

## 2017-04-18 ENCOUNTER — ANTICOAGULATION MONITORING (OUTPATIENT)
Dept: VASCULAR LAB | Facility: MEDICAL CENTER | Age: 82
End: 2017-04-18

## 2017-04-18 DIAGNOSIS — Z79.01 LONG TERM CURRENT USE OF ANTICOAGULANTS WITH INR GOAL OF 2.0-3.0: ICD-10-CM

## 2017-04-18 DIAGNOSIS — I48.91 ATRIAL FIBRILLATION, UNSPECIFIED TYPE (HCC): ICD-10-CM

## 2017-04-18 NOTE — PROGRESS NOTES
Anticoagulation Summary as of 4/18/2017     INR goal 2.0-3.0   Selected INR 2.9 (4/17/2017)   Maintenance plan 2.5 mg (2.5 mg x 1) on Mon, Fri; 5 mg (2.5 mg x 2) all other days   Weekly total 30 mg   No change documented Zulma Pa   Plan last modified Toney Spicer, PHARMD (12/5/2016)   Next INR check 5/1/2017   Target end date     Indications   Long term current use of anticoagulants with INR goal of 2.0-3.0 [Z79.01]  Deep venous thrombosis  left [I82.402]  Atrial fibrillation [I48.91] [I48.91]         Anticoagulation Episode Summary     INR check location Home Draw    Preferred lab     Send INR reminders to     Comments       Anticoagulation Care Providers     Provider Role Specialty Phone number    CELIO Baeza. Referring Family Medicine [phone]    JUDY GallegosD Responsible          Anticoagulation Patient Findings   Negatives Missed Doses, Extra Doses, Medication Changes, Antibiotic Use, Diet Changes, Dental/Other Procedures, Hospitalization, Bleeding Gums, Nose Bleeds, Blood in Urine, Blood in Stool, Any Bruising, Other Complaints        Spoke with the patient on the phone today, reporting a therapeutic INR of 2.9.  Confirmed the current warfarin dosing regimen and patient compliance. Patient denies any interval changes to diet and/or medications. Patient denies any signs/symptoms of bleeding or clotting. Patient will continue with the current warfarin dosing regimen, and will follow up again in 2 weeks.    Tom Pa  PharmD

## 2017-04-25 ENCOUNTER — PATIENT MESSAGE (OUTPATIENT)
Dept: MEDICAL GROUP | Facility: PHYSICIAN GROUP | Age: 82
End: 2017-04-25

## 2017-04-25 DIAGNOSIS — Z79.01 LONG TERM CURRENT USE OF ANTICOAGULANTS WITH INR GOAL OF 2.0-3.0: ICD-10-CM

## 2017-04-25 NOTE — TELEPHONE ENCOUNTER
----- Message from Your Healthcare Team sent at 2017  1:51 PM PDT -----  Regarding: Prescription Question  Contact: [phone]  My perscription vor Metaformin  tab 90 tablets has .  If you wish for me to continue medication I need a refill perscription sent to [facility].      Thank you  Mrary Mathur

## 2017-05-03 ENCOUNTER — ANTICOAGULATION MONITORING (OUTPATIENT)
Dept: VASCULAR LAB | Facility: MEDICAL CENTER | Age: 82
End: 2017-05-03

## 2017-05-03 DIAGNOSIS — Z79.01 LONG TERM CURRENT USE OF ANTICOAGULANTS WITH INR GOAL OF 2.0-3.0: ICD-10-CM

## 2017-05-03 DIAGNOSIS — I48.91 ATRIAL FIBRILLATION, UNSPECIFIED TYPE (HCC): ICD-10-CM

## 2017-05-03 LAB — INR PPP: 4.1 (ref 2–3.5)

## 2017-05-03 NOTE — PROGRESS NOTES
Anticoagulation Summary as of 5/3/2017     INR goal 2.0-3.0   Selected INR 4.1! (5/3/2017)   Maintenance plan 2.5 mg (2.5 mg x 1) on Mon, Fri; 5 mg (2.5 mg x 2) all other days   Weekly total 30 mg   Plan last modified Toney Spicer, PHARMD (12/5/2016)   Next INR check 5/10/2017   Target end date     Indications   Long term current use of anticoagulants with INR goal of 2.0-3.0 [Z79.01]  Deep venous thrombosis  left [I82.402]  Atrial fibrillation [I48.91] [I48.91]         Anticoagulation Episode Summary     INR check location Home Draw    Preferred lab     Send INR reminders to     Comments       Anticoagulation Care Providers     Provider Role Specialty Phone number    CELIO Baeza. Referring Family Medicine [phone]    Rachel Alejandra, JUDYD Responsible          Anticoagulation Patient Findings    Left voicemail message to report a supratherapeutic INR of 4.1.  Requested pt contact the clinic for any s/s of unusual bleeding, bruising, clotting or any changes to diet or medication.   Instructed patient to HOLD X 1, 2.5mg X 1, then resume current warfarin regimen.  FU 1 weeks.  Steve Hahn, JUDYD

## 2017-05-04 DIAGNOSIS — M06.9 RHEUMATOID ARTHRITIS, INVOLVING UNSPECIFIED SITE, UNSPECIFIED RHEUMATOID FACTOR PRESENCE: ICD-10-CM

## 2017-05-04 RX ORDER — HYDROCODONE BITARTRATE AND ACETAMINOPHEN 5; 325 MG/1; MG/1
TABLET ORAL
Qty: 30 TAB | Refills: 0 | Status: CANCELLED | OUTPATIENT
Start: 2017-05-04

## 2017-05-04 RX ORDER — HYDROCODONE BITARTRATE AND ACETAMINOPHEN 5; 325 MG/1; MG/1
TABLET ORAL
Qty: 30 TAB | Refills: 0 | Status: SHIPPED | OUTPATIENT
Start: 2017-05-10 | End: 2017-06-01 | Stop reason: SDUPTHER

## 2017-05-04 NOTE — TELEPHONE ENCOUNTER
Was the patient seen in the last year in this department? Yes   Feb Labs    Does patient have an active prescription for medications requested? No     Received Request Via: Patient     Pt will  when ready

## 2017-05-12 ENCOUNTER — HOSPITAL ENCOUNTER (OUTPATIENT)
Dept: RADIOLOGY | Facility: MEDICAL CENTER | Age: 82
End: 2017-05-12
Attending: PHYSICAL MEDICINE & REHABILITATION
Payer: MEDICARE

## 2017-05-12 DIAGNOSIS — M54.5 LOW BACK PAIN, UNSPECIFIED BACK PAIN LATERALITY, UNSPECIFIED CHRONICITY, WITH SCIATICA PRESENCE UNSPECIFIED: ICD-10-CM

## 2017-05-12 PROCEDURE — 73502 X-RAY EXAM HIP UNI 2-3 VIEWS: CPT | Mod: LT

## 2017-05-12 PROCEDURE — 72148 MRI LUMBAR SPINE W/O DYE: CPT

## 2017-05-17 DIAGNOSIS — I10 ESSENTIAL HYPERTENSION, BENIGN: ICD-10-CM

## 2017-05-17 DIAGNOSIS — I45.10 RIGHT BUNDLE BRANCH BLOCK: ICD-10-CM

## 2017-05-17 DIAGNOSIS — E78.5 OTHER AND UNSPECIFIED HYPERLIPIDEMIA: ICD-10-CM

## 2017-05-17 DIAGNOSIS — R94.31 NONSPECIFIC ABNORMAL ELECTROCARDIOGRAM (ECG) (EKG): ICD-10-CM

## 2017-05-17 DIAGNOSIS — R73.9 HYPERGLYCEMIA: ICD-10-CM

## 2017-05-17 PROBLEM — D04.61 CARCINOMA IN SITU OF SKIN OF RIGHT UPPER LIMB, INCLUDING SHOULDER: Status: ACTIVE | Noted: 2017-05-17

## 2017-05-17 RX ORDER — OMEPRAZOLE 20 MG/1
20 CAPSULE, DELAYED RELEASE ORAL DAILY
Qty: 90 CAP | Refills: 3 | Status: SHIPPED | OUTPATIENT
Start: 2017-05-17 | End: 2017-11-02

## 2017-05-18 LAB — INR PPP: 1.1 (ref 2–3.5)

## 2017-05-19 ENCOUNTER — ANTICOAGULATION MONITORING (OUTPATIENT)
Dept: VASCULAR LAB | Facility: MEDICAL CENTER | Age: 82
End: 2017-05-19

## 2017-05-19 ENCOUNTER — HOSPITAL ENCOUNTER (OUTPATIENT)
Dept: RADIOLOGY | Facility: REHABILITATION | Age: 82
End: 2017-05-19
Attending: PHYSICAL MEDICINE & REHABILITATION
Payer: MEDICARE

## 2017-05-19 ENCOUNTER — HOSPITAL ENCOUNTER (OUTPATIENT)
Dept: PAIN MANAGEMENT | Facility: REHABILITATION | Age: 82
End: 2017-05-19
Attending: PHYSICAL MEDICINE & REHABILITATION
Payer: MEDICARE

## 2017-05-19 VITALS
DIASTOLIC BLOOD PRESSURE: 84 MMHG | RESPIRATION RATE: 16 BRPM | HEIGHT: 63 IN | TEMPERATURE: 98.2 F | WEIGHT: 160.94 LBS | BODY MASS INDEX: 28.52 KG/M2 | OXYGEN SATURATION: 96 % | SYSTOLIC BLOOD PRESSURE: 164 MMHG | HEART RATE: 66 BPM

## 2017-05-19 DIAGNOSIS — Z79.01 LONG TERM CURRENT USE OF ANTICOAGULANTS WITH INR GOAL OF 2.0-3.0: ICD-10-CM

## 2017-05-19 PROCEDURE — 700101 HCHG RX REV CODE 250

## 2017-05-19 PROCEDURE — G0260 INJ FOR SACROILIAC JT ANESTH: HCPCS

## 2017-05-19 PROCEDURE — 700117 HCHG RX CONTRAST REV CODE 255

## 2017-05-19 PROCEDURE — 700111 HCHG RX REV CODE 636 W/ 250 OVERRIDE (IP)

## 2017-05-19 RX ORDER — TRIAMCINOLONE ACETONIDE 40 MG/ML
INJECTION, SUSPENSION INTRA-ARTICULAR; INTRAMUSCULAR
Status: COMPLETED
Start: 2017-05-19 | End: 2017-05-19

## 2017-05-19 RX ORDER — LIDOCAINE HYDROCHLORIDE 20 MG/ML
INJECTION, SOLUTION EPIDURAL; INFILTRATION; INTRACAUDAL; PERINEURAL
Status: COMPLETED
Start: 2017-05-19 | End: 2017-05-19

## 2017-05-19 RX ADMIN — TRIAMCINOLONE ACETONIDE 80 MG: 40 INJECTION, SUSPENSION INTRA-ARTICULAR; INTRAMUSCULAR at 09:26

## 2017-05-19 RX ADMIN — IOHEXOL 2 ML: 240 INJECTION, SOLUTION INTRATHECAL; INTRAVASCULAR; INTRAVENOUS; ORAL at 09:25

## 2017-05-19 RX ADMIN — LIDOCAINE HYDROCHLORIDE 4 ML: 20 INJECTION, SOLUTION EPIDURAL; INFILTRATION; INTRACAUDAL; PERINEURAL at 09:23

## 2017-05-19 ASSESSMENT — PAIN SCALES - GENERAL
PAINLEVEL_OUTOF10: 4
PAINLEVEL_OUTOF10: 0

## 2017-05-19 NOTE — PROGRESS NOTES
Current meds. See medication reconciliation form. Reviewed with pt.Pt has been off coumadin  For 6 days. Pt denies taking ASA,other blood thinners or anti-inflammatories. made aware pre-procedure. Pt has a ride post-procedure (Alexsander Lemon is ). Printed and verbal discharge instructions given to pt who verbalized understanding.

## 2017-05-19 NOTE — PROGRESS NOTES
Patient positioned by RN  X- ray Tech.  Ankle supported on pillow. Arms supported by stool at head of the bed.

## 2017-05-19 NOTE — PROGRESS NOTES
Anticoagulation Summary as of 5/19/2017     INR goal 2.0-3.0   Selected INR 1.1! (5/18/2017)   Maintenance plan 2.5 mg (2.5 mg x 1) on Mon, Fri; 5 mg (2.5 mg x 2) all other days   Weekly total 30 mg   Plan last modified Toney Spicer, PHARMD (12/5/2016)   Next INR check 5/26/2017   Target end date     Indications   Long term current use of anticoagulants with INR goal of 2.0-3.0 [Z79.01]  Deep venous thrombosis  left [I82.402]  Atrial fibrillation [I48.91] [I48.91]         Anticoagulation Episode Summary     INR check location Home Draw    Preferred lab     Send INR reminders to     Comments       Anticoagulation Care Providers     Provider Role Specialty Phone number    CELIO Baeza. Referring Family Medicine [phone]    JUDY GallegosD Responsible          Anticoagulation Patient Findings    Left voicemail message to report a SUB therapeutic INR of 1.1.  MR states he has a procedure today, unkown type of procedure, therefore instructed pt to continue with current warfarin dosing regimen starting tomorrow. Requested pt contact the clinic for any s/s of unusual bleeding, bruising, clotting or any changes to diet or medication. FU 1 week.    Toney Spicer, JUDYD

## 2017-05-20 NOTE — PROCEDURES
DATE OF SERVICE:  05/19/2017    NAME OF PROCEDURE:  1.  Left sacroiliac joint injection with 40 mg of Kenalog under fluoroscopic   guidance.  2.  Right sacroiliac joint injection with 40 mg of Kenalog under fluoroscopic   guidance.    INDICATION:  The patient is a patient of mine with persistent low back pain   felt to be due to SI joint inflammation and dysfunction.  For this reason,   sacroiliac joint injections were chosen for today's procedure.    DESCRIPTION OF PROCEDURE:  After appropriate informed consent was obtained,   the patient was placed prone on the table.  Her skin was thoroughly cleansed   with Betadine swabs x3, then a subcutaneous, intramuscular, interligamentous   region was anesthetized with lidocaine.  A 3.5-inch spinal needle was directed   under intermittent fluoroscopic guidance to the left sacroiliac joint   followed by the right sacroiliac joint.    ARTHROGRAM:  Once the joint was felt to have been cannulated, a small amount   of Omnipaque was placed.  This confirmed needle tip placement by showing the   dye in the upper and lower joint with a good spread pattern.  Kenalog 40 mg   along with 1 mL of 2% lidocaine was placed into each of the joints.  She   tolerated the procedure well without procedure complications.  She will be   referred to the recovery and followup in the office in the next week to   monitor the response to injection.       ____________________________________     ALFONSO MARTINEZ MD    AT / [facility]    DD:  05/19/2017 11:30:24  DT:  05/19/2017 23:18:48    D#:  [id number]  Job#:  [id number]    cc: LAMONT SANTIAGO MD, CORINNE WINKLER

## 2017-05-22 ENCOUNTER — HOSPITAL ENCOUNTER (OUTPATIENT)
Dept: LAB | Facility: MEDICAL CENTER | Age: 82
End: 2017-05-22
Attending: INTERNAL MEDICINE
Payer: MEDICARE

## 2017-05-22 DIAGNOSIS — M06.9 RHEUMATOID ARTHRITIS, INVOLVING UNSPECIFIED SITE, UNSPECIFIED RHEUMATOID FACTOR PRESENCE: ICD-10-CM

## 2017-05-22 DIAGNOSIS — M85.80 OSTEOPENIA: ICD-10-CM

## 2017-05-22 LAB
ALBUMIN SERPL BCP-MCNC: 4.1 G/DL (ref 3.2–4.9)
ALBUMIN/GLOB SERPL: 1.5 G/DL
ALP SERPL-CCNC: 55 U/L (ref 30–99)
ALT SERPL-CCNC: 28 U/L (ref 2–50)
ANION GAP SERPL CALC-SCNC: 7 MMOL/L (ref 0–11.9)
AST SERPL-CCNC: 22 U/L (ref 12–45)
BASOPHILS # BLD AUTO: 0.3 % (ref 0–1.8)
BASOPHILS # BLD: 0.02 K/UL (ref 0–0.12)
BILIRUB SERPL-MCNC: 1 MG/DL (ref 0.1–1.5)
BUN SERPL-MCNC: 32 MG/DL (ref 8–22)
CALCIUM SERPL-MCNC: 9.2 MG/DL (ref 8.5–10.5)
CHLORIDE SERPL-SCNC: 106 MMOL/L (ref 96–112)
CO2 SERPL-SCNC: 28 MMOL/L (ref 20–33)
CREAT SERPL-MCNC: 0.97 MG/DL (ref 0.5–1.4)
EOSINOPHIL # BLD AUTO: 0.01 K/UL (ref 0–0.51)
EOSINOPHIL NFR BLD: 0.2 % (ref 0–6.9)
ERYTHROCYTE [DISTWIDTH] IN BLOOD BY AUTOMATED COUNT: 49.1 FL (ref 35.9–50)
ERYTHROCYTE [SEDIMENTATION RATE] IN BLOOD BY WESTERGREN METHOD: 14 MM/HOUR (ref 0–30)
GFR SERPL CREATININE-BSD FRML MDRD: 55 ML/MIN/1.73 M 2
GLOBULIN SER CALC-MCNC: 2.8 G/DL (ref 1.9–3.5)
GLUCOSE SERPL-MCNC: 176 MG/DL (ref 65–99)
HCT VFR BLD AUTO: 43.2 % (ref 37–47)
HGB BLD-MCNC: 14.2 G/DL (ref 12–16)
IMM GRANULOCYTES # BLD AUTO: 0.01 K/UL (ref 0–0.11)
IMM GRANULOCYTES NFR BLD AUTO: 0.2 % (ref 0–0.9)
LYMPHOCYTES # BLD AUTO: 1.37 K/UL (ref 1–4.8)
LYMPHOCYTES NFR BLD: 21.6 % (ref 22–41)
MCH RBC QN AUTO: 30.7 PG (ref 27–33)
MCHC RBC AUTO-ENTMCNC: 32.9 G/DL (ref 33.6–35)
MCV RBC AUTO: 93.3 FL (ref 81.4–97.8)
MONOCYTES # BLD AUTO: 0.47 K/UL (ref 0–0.85)
MONOCYTES NFR BLD AUTO: 7.4 % (ref 0–13.4)
NEUTROPHILS # BLD AUTO: 4.46 K/UL (ref 2–7.15)
NEUTROPHILS NFR BLD: 70.3 % (ref 44–72)
NRBC # BLD AUTO: 0 K/UL
NRBC BLD AUTO-RTO: 0 /100 WBC
PLATELET # BLD AUTO: 210 K/UL (ref 164–446)
PMV BLD AUTO: 11.5 FL (ref 9–12.9)
POTASSIUM SERPL-SCNC: 4.4 MMOL/L (ref 3.6–5.5)
PROT SERPL-MCNC: 6.9 G/DL (ref 6–8.2)
RBC # BLD AUTO: 4.63 M/UL (ref 4.2–5.4)
SODIUM SERPL-SCNC: 141 MMOL/L (ref 135–145)
WBC # BLD AUTO: 6.3 K/UL (ref 4.8–10.8)

## 2017-05-22 PROCEDURE — 85025 COMPLETE CBC W/AUTO DIFF WBC: CPT

## 2017-05-22 PROCEDURE — 80053 COMPREHEN METABOLIC PANEL: CPT

## 2017-05-22 PROCEDURE — 36415 COLL VENOUS BLD VENIPUNCTURE: CPT

## 2017-05-22 PROCEDURE — 85652 RBC SED RATE AUTOMATED: CPT

## 2017-05-31 ENCOUNTER — ANTICOAGULATION MONITORING (OUTPATIENT)
Dept: VASCULAR LAB | Facility: MEDICAL CENTER | Age: 82
End: 2017-05-31

## 2017-05-31 DIAGNOSIS — Z79.01 LONG TERM CURRENT USE OF ANTICOAGULANTS WITH INR GOAL OF 2.0-3.0: ICD-10-CM

## 2017-05-31 LAB — INR PPP: 2.2 (ref 2–3.5)

## 2017-05-31 NOTE — PROGRESS NOTES
Anticoagulation Summary as of 5/31/2017     INR goal 2.0-3.0   Selected INR 2.2 (5/31/2017)   Maintenance plan 2.5 mg (2.5 mg x 1) on Mon, Fri; 5 mg (2.5 mg x 2) all other days   Weekly total 30 mg   Plan last modified Toney Spicer, PHARMD (12/5/2016)   Next INR check 6/12/2017   Target end date     Indications   Long term current use of anticoagulants with INR goal of 2.0-3.0 [Z79.01]  Deep venous thrombosis  left [I82.402]  Atrial fibrillation [I48.91] [I48.91]         Anticoagulation Episode Summary     INR check location Home Draw    Preferred lab     Send INR reminders to     Comments       Anticoagulation Care Providers     Provider Role Specialty Phone number    CELIO Baeza. Referring Family Medicine [phone]    JUDY GallegosD Responsible          Anticoagulation Patient Findings    Spoke to the patient on the phone. Patient denies any signs of bleeding or bruising. Patient's INR is therapeutic at 2.2. No recent changes in diet or medications. Patient instructed to continue the current warfarin dosing as shown above. Will like to check INR sooner but per patient request follow-up in 2 week(s).    Stephanie Berumen, Pharm.D

## 2017-06-01 ENCOUNTER — OFFICE VISIT (OUTPATIENT)
Dept: RHEUMATOLOGY | Facility: PHYSICIAN GROUP | Age: 82
End: 2017-06-01
Payer: MEDICARE

## 2017-06-01 VITALS
TEMPERATURE: 97.9 F | DIASTOLIC BLOOD PRESSURE: 70 MMHG | BODY MASS INDEX: 28 KG/M2 | WEIGHT: 158 LBS | SYSTOLIC BLOOD PRESSURE: 128 MMHG | OXYGEN SATURATION: 97 % | RESPIRATION RATE: 16 BRPM | HEART RATE: 58 BPM

## 2017-06-01 DIAGNOSIS — M54.50 CHRONIC BILATERAL LOW BACK PAIN WITHOUT SCIATICA: ICD-10-CM

## 2017-06-01 DIAGNOSIS — Z79.01 LONG TERM CURRENT USE OF ANTICOAGULANTS WITH INR GOAL OF 2.0-3.0: ICD-10-CM

## 2017-06-01 DIAGNOSIS — M85.89 OSTEOPENIA OF MULTIPLE SITES: ICD-10-CM

## 2017-06-01 DIAGNOSIS — M06.9 RHEUMATOID ARTHRITIS, INVOLVING UNSPECIFIED SITE, UNSPECIFIED RHEUMATOID FACTOR PRESENCE: ICD-10-CM

## 2017-06-01 DIAGNOSIS — G89.29 CHRONIC BILATERAL LOW BACK PAIN WITHOUT SCIATICA: ICD-10-CM

## 2017-06-01 DIAGNOSIS — I48.20 CHRONIC ATRIAL FIBRILLATION (HCC): ICD-10-CM

## 2017-06-01 DIAGNOSIS — R25.2 SPASM: ICD-10-CM

## 2017-06-01 DIAGNOSIS — I10 ESSENTIAL HYPERTENSION, BENIGN: ICD-10-CM

## 2017-06-01 PROCEDURE — 1101F PT FALLS ASSESS-DOCD LE1/YR: CPT | Performed by: INTERNAL MEDICINE

## 2017-06-01 PROCEDURE — G8417 CALC BMI ABV UP PARAM F/U: HCPCS | Performed by: INTERNAL MEDICINE

## 2017-06-01 PROCEDURE — 4040F PNEUMOC VAC/ADMIN/RCVD: CPT | Performed by: INTERNAL MEDICINE

## 2017-06-01 PROCEDURE — G8432 DEP SCR NOT DOC, RNG: HCPCS | Performed by: INTERNAL MEDICINE

## 2017-06-01 PROCEDURE — 1036F TOBACCO NON-USER: CPT | Performed by: INTERNAL MEDICINE

## 2017-06-01 PROCEDURE — 99214 OFFICE O/P EST MOD 30 MIN: CPT | Performed by: INTERNAL MEDICINE

## 2017-06-01 RX ORDER — TIZANIDINE HYDROCHLORIDE 2 MG/1
CAPSULE, GELATIN COATED ORAL
Qty: 60 CAP | Refills: 2 | Status: SHIPPED | OUTPATIENT
Start: 2017-06-01 | End: 2017-11-02

## 2017-06-01 RX ORDER — HYDROCODONE BITARTRATE AND ACETAMINOPHEN 5; 325 MG/1; MG/1
TABLET ORAL
Qty: 30 TAB | Refills: 0 | Status: SHIPPED | OUTPATIENT
Start: 2017-06-01 | End: 2017-07-07 | Stop reason: SDUPTHER

## 2017-06-01 NOTE — MR AVS SNAPSHOT
Marry Swanway   2017 11:45 AM   Office Visit   MRN: [id number]    Department:  R[facility]umatology [facility]   Dept Phone:  [phone]    Description:  Female : 1935   Provider:  Deanna Escoto M.D.           Reason for Visit     Follow-Up           Allergies as of 2017     Allergen Noted Reactions    Sulfa Drugs 2012   Vomiting    RXN=young person      You were diagnosed with     Rheumatoid arthritis, involving unspecified site, unspecified rheumatoid factor presence (CMS-[facility])   [3251901]       Osteopenia of multiple sites   [2712684]       Spasm   [531834]       Essential hypertension, benign   [401.1.ICD-9-CM]       Chronic atrial fibrillation (CMS-[facility])   [936408]         Vital Signs     Blood Pressure Pulse Temperature Respirations Weight Oxygen Saturation    128/70 mmHg 58 36.6 °C (97.9 °F) 16 71.668 kg (158 lb) 97%    Smoking Status                   Never Smoker            Basic Information     Date Of Birth Sex Race Ethnicity Preferred Language    1935 Female White Non- English      Your appointments     Aug 31, 2017 10:45 AM   Follow Up Visit with Deanna Escoto M.D.   [facility] (--)    [address]  [facility] [address] [phone]           You will be receiving a confirmation call a few days before your appointment from our automated call confirmation system.              Problem List              ICD-10-CM Priority Class Noted - Resolved    Nonspecific abnormal electrocardiogram (ECG) (EKG) R94.31 High  2012 - Present    Right bundle branch block I45.10 High  2012 - Present    Essential hypertension, benign I10 High  2012 - Present    Other and unspecified hyperlipidemia E78.5 High  2012 - Present    Hyperglycemia R73.9 High  2012 - Present    GERD (gastroesophageal reflux disease) K21.9 High  2012 - Present    Back pain M54.9 High  2012 - Present    Long term current use of anticoagulants  with INR goal of 2.0-3.0 Z79.01   3/8/2016 - Present    Deep venous thrombosis, left I82.402   3/8/2016 - Present    Rheumatoid arthritis (CMS-HCC) M06.9   3/14/2016 - Present    Atrial fibrillation [I48.91] I48.91   4/19/2016 - Present    Hernia, inguinal, right K40.90   9/23/2016 - Present      Health Maintenance        Date Due Completion Dates    IMM DTaP/Tdap/Td Vaccine (1 - Tdap) 8/27/1955 ---    COLONOSCOPY 8/27/1986 ---    IMM ZOSTER VACCINE 8/27/1996 ---    IMM PNEUMOCOCCAL 65+ (ADULT) LOW/MEDIUM RISK SERIES (2 of 2 - PPSV23) 3/8/2017 3/8/2016    MAMMOGRAM 3/18/2017 3/18/2016, 10/9/2006, 10/7/2005, 10/5/2004    BONE DENSITY 3/18/2021 3/18/2016            Current Immunizations     13-VALENT PCV PREVNAR 3/8/2016    Influenza Vaccine Adult HD 11/1/2016      Below and/or attached are the medications your provider expects you to take. Review all of your home medications and newly ordered medications with your provider and/or pharmacist. Follow medication instructions as directed by your provider and/or pharmacist. Please keep your medication list with you and share with your provider. Update the information when medications are discontinued, doses are changed, or new medications (including over-the-counter products) are added; and carry medication information at all times in the event of emergency situations     Allergies:  SULFA DRUGS - Vomiting               Medications  Valid as of: June 01, 2017 - 11:51 AM    Generic Name Brand Name Tablet Size Instructions for use    Acetaminophen (Tab) TYLENOL 325 MG Take 650 mg by mouth at bedtime as needed.        Adalimumab (Prefilled Syringe Kit) Adalimumab 40 MG/0.8ML Inject 0.8 mL as instructed every 14 days.        Atorvastatin Calcium (Tab) LIPITOR 20 MG Take 20 mg by mouth every evening.        Calcium Citrate-Vitamin D   Take 1 Tab by mouth every day.        Cholecalciferol (Tab) cholecalciferol 1000 UNIT Take 1,000 Units by mouth every day.        Folic Acid  (Tab) FOLVITE 400 MCG Take 400 mcg by mouth every day.        Hydrocodone-Acetaminophen (Tab) NORCO 5-325 MG 1 tab po bid for severe joint pain, NO ALCOHOL and NO DRIVING within 24 hrs of taking this medication, NO BENZODIAZEPINE medications, no selling or giving to any other persons        Lidocaine (Patch) LIDODERM 5 % Apply 1 Patch to skin as directed every 24 hours.        Magnesium Oxide (Tab) MAG- MG Take 400 mg by mouth 2 times a day.        MetFORMIN HCl (Tab) GLUCOPHAGE 500 MG Take 1 Tab by mouth every day.        Methotrexate Sodium (Tab) methotrexate 2.5 MG 6 tabs po q wednesday        Omeprazole (CAPSULE DELAYED RELEASE) PRILOSEC 20 MG Take 1 Cap by mouth every day.        Potassium Gluconate   Take 1 Tab by mouth every day.        Pregabalin (Cap) LYRICA 75 MG Take 1 Cap by mouth 2 times a day.        TiZANidine HCl (Cap) ZANAFLEX 2 MG 1-2 tabs po qhs for muscle relaxer        Valsartan-Hydrochlorothiazide (Tab) DIOVAN-HCT 80-12.5 MG Take 0.5 Tabs by mouth every day.        Warfarin Sodium (Tab) COUMADIN 2.5 MG Take 1 to 2 tablets by mouth daily as directed by the coumadin clinic        .                 Medicines prescribed today were sent to:     [facility] [place], [place] [address] [place] [address] [address] [facility] [address]    Phone: [phone] Fax: [phone]    Open 24 Hours?: No    [facility] - [place], [place] - [address] AT [facility]    [address] [facility] [place] [address]    Phone: [phone] Fax: [phone]    Open 24 Hours?: No    [facility] [address] [facility], [place] - [address] [facility]    [address] [place] [address]    Phone: [phone] Fax: [phone]    Open 24 Hours?: No      Medication refill instructions:       If your prescription bottle indicates you have medication refills left, it is not necessary to call your provider’s office. Please contact your pharmacy and they will refill your medication.    If your  prescription bottle indicates you do not have any refills left, you may request refills at any time through one of the following ways: The online [employer] system (except Urgent Care), by calling your provider’s office, or by asking your pharmacy to contact your provider’s office with a refill request. Medication refills are processed only during regular business hours and may not be available until the next business day. Your provider may request additional information or to have a follow-up visit with you prior to refilling your medication.   *Please Note: Medication refills are assigned a new Rx number when refilled electronically. Your pharmacy may indicate that no refills were authorized even though a new prescription for the same medication is available at the pharmacy. Please request the medicine by name with the pharmacy before contacting your provider for a refill.           [employer] Access Code: Activation code not generated  Current [employer] Status: Active

## 2017-06-01 NOTE — Clinical Note
June 1, 2017        Marry Mathur    To Whom it may Concern,  This patient needs the medication Humira to treat a medical condition.        Deanna Escoto

## 2017-06-01 NOTE — Clinical Note
[facility]   [address]  [place] [address]  Phone: [phone]  Fax: [phone]              Encounter Date: 6/1/2017    Dear Dr. Eaton ref. provider found,    It was a pleasure seeing your patient, Marry Mathur, on 6/1/2017. Diagnoses of Rheumatoid arthritis, involving unspecified site, unspecified rheumatoid factor presence (CMS-HCC), Osteopenia of multiple sites, Spasm, Essential hypertension, benign, Chronic atrial fibrillation (CMS-HCC), Long term current use of anticoagulants with INR goal of 2.0-3.0, and Chronic bilateral low back pain without sciatica were pertinent to this visit.     Please find attached progress note which includes the history I obtained from Ms. Mathur, my physical examination findings, my impression and recommendations.      Once again, it was a pleasure participating in your patient's care.  Please feel free to contact me if you have any questions or if I can be of any further assistance to your patients.      Sincerely,    Deanna Escoto M.D.  Electronically Signed          PROGRESS NOTE:  Chief Complaint- joint pain    Subjective:   Marry Mathur is a 81 y.o. female here today for follow up of rheumatological issues    Pt here with a dx of RA at Dr Peguero office currently on  MTX 15 mg po qweek and Humira injections 40 mg subcutaneous every 2 weeks.  No psoriaisis, positive skin cancer, positive melanoma 2001, no iritis/uveitis, no FUO, no weight loss, no recurrent infections, no colitis, no recurrent infections, no new neurological disorders, no fevers of unknown etiology. Since last visit, patient status post cortisone injections in bilateral sacroiliac joints and she feels really quite good and able to ambulate. Patient is scheduled to go [place] and is anxious about going.   Pt also diabetic, takes metformin, also with a diagnosis of spinal stenosis with intermittent weakness  in her legs. Patient currently undergoing physical therapy for balance and strength as in her legs, thinks it is helping her. Denies any new rashes, denies any unexplained weight loss, denies any new skin cancers, is followed very closely by her dermatologist regarding skin cancers. Patient states her predominant issues low back problems patient's of low back problems for years, undergoes physical therapy 3 times a week already.    S/p Remicade-stopped because of hx of melanoma about 1996 and has multiple other skin cancers  S/p Orencia-lost efficacy      Hep B neg 6/2016  Hand X-rays 3/2016  Indicates DJD  Feet x-rays 3/2016  Indicates DJD  DEXA 3/2016 T scores 1.1, -1.1   Echocardiogram 7/2012 normal [facility]  RF neg 3/2014 [facility]; RF neg 6/2014 [facility]; RF neg 6/2016  CCP neg 3/2014 [facility]; CCP neg 6/2014 [facility]; CCP neg 6/2016  Uric acid 4.7 3/2014 [facility];Uric acid 4.5 6/2016  Quantiferon Gold neg 6/2014 [facility]; Quantiferon Gold neg 6/2016  Hand x-rays 3/2016-indicate osteoarthritis  Feet x-rays-indicate osteoarthritis        Current medicines (including changes today)  Current Outpatient Prescriptions   Medication Sig Dispense Refill   • hydrocodone-acetaminophen (NORCO) 5-325 MG Tab per tablet 1 tab po bid for severe joint pain, NO ALCOHOL and NO DRIVING within 24 hrs of taking this medication, NO BENZODIAZEPINE medications, no selling or giving to any other persons 30 Tab 0   • tizanidine (ZANAFLEX) 2 MG capsule 1-2 tabs po qhs for muscle relaxer 60 Cap 2   • omeprazole (PRILOSEC) 20 MG delayed-release capsule Take 1 Cap by mouth every day. 90 Cap 3   • metformin (GLUCOPHAGE) 500 MG Tab Take 1 Tab by mouth every day. 90 Tab 3   • methotrexate 2.5 MG Tab 6 tabs po q wednesday 72 Tab 0   • warfarin (COUMADIN) 2.5 MG Tab Take 1 to 2 tablets by mouth daily as directed by the coumadin clinic 180 Tab 1   • pregabalin (LYRICA) 75 MG Cap Take 1 Cap by mouth 2 times a day. 180 Cap 1   •  Adalimumab 40 MG/0.8ML Prefilled Syringe Kit Inject 0.8 mL as instructed every 14 days. 6 Each 1   • acetaminophen (TYLENOL) 325 MG Tab Take 650 mg by mouth at bedtime as needed.     • folic acid (FOLVITE) 400 MCG tablet Take 400 mcg by mouth every day.     • valsartan-hydrochlorothiazide (DIOVAN HCT) 80-12.5 MG per tablet Take 0.5 Tabs by mouth every day.     • vitamin D (CHOLECALCIFEROL) 1000 UNIT Tab Take 1,000 Units by mouth every day.     • atorvastatin (LIPITOR) 20 MG Tab Take 20 mg by mouth every evening.     • POTASSIUM GLUCONATE Take 1 Tab by mouth every day.     • magnesium oxide (MAG-OX) 400 MG TABS Take 400 mg by mouth 2 times a day.     • Calcium Carbonate-Vitamin D (CALCIUM + D PO) Take 1 Tab by mouth every day.     • lidocaine (LIDODERM) 5 % Patch Apply 1 Patch to skin as directed every 24 hours. 90 Patch 1     No current facility-administered medications for this visit.     She  has a past medical history of Prediabetes; Hyperlipidemia; Hypertension; Rheumatoid arthritis with rheumatoid factor (CMS-[facility]); Hiatus hernia syndrome; Cancer (CMS-HCC); Chronic back pain greater than 3 months duration; and Blood clotting disorder (CMS-HCC) (2012).    ROS   Other than what is mentioned in HPI or physical exam, there is no history of headaches, double vision or blurred vision. No temporal tenderness or jaw claudication. No history of cataracts or glaucoma. No trouble swallowing difficulties or sore throats. No history of thyroid disease. No chest complaints including chest pain, cough or sputum production. No shortness of breath. No GI complaints including nausea, vomiting, change in bowel habits, or past peptic ulcer disease. No history of blood in the stools. No urinary complaints including dysuria or frequency. No history of rash including psoriasis. No history of alopecia, photosensitivity, oral ulcerations, Raynaud's phenomena, or swollen joints. No history of gout. No back complaints. No history of low  blood counts.       Objective:     Blood pressure 128/70, pulse 58, temperature 36.6 °C (97.9 °F), resp. rate 16, weight 71.668 kg (158 lb), SpO2 97 %. Body mass index is 28 kg/(m^2).   Physical Exam:  Constitutional: Alert and oriented X3,.Skin: Warm, dry, good turgor, no rashes in visible areas, multiple areas of resection of skin cancersEye: Equal, round and reactive, conjunctiva clear, lids normal EOM intact , mild arcus senilis bilaterallyENMT: Lips without lesions, good dentition, no oropharyngeal ulcers, moist buccal mucosa, pinna without deformityNeck: Trachea midline, no masses, no thyromegaly.Lymph:  No cervical lymphadenopathy, no axillary lymphadenopathy, no supraclavicular lymphadenopathyRespiratory: Unlabored respiratory effort, lungs clear to auscultation, no wheezes, no ronchi.Cardiovascular: Normal S1, S2, patient has a 3/6 systolic ejection murmur heard both at the right and left sternal borders, no edema.Abdomen: Soft, non-tender, no masses, no hepatosplenomegaly.Psych: Alert and oriented x3, normal affect and mood.Neuro: Cranial nerves 2-12 are grossly intact, no loss of sensation LEExt:no joint laxity noted in bilateral arms, no joint laxity noted in bilateral legs, gait without antalgia and without foot drop, there is evidence of Heberden's nodes especially in the index DIP joint, no christin swan-neck or boutonniere deformities, no ulnar deviation, no flexure contractures in the elbows, knees with crepitus bilaterally but no effusions, no Achilles tendon inflammation, some mild synovitis noted on the right middle PIP joint, and left index PIP joint    Lab Results   Component Value Date/Time    QUANTIFERON TB GOLD Negative 06/29/2016 02:03 PM     Lab Results   Component Value Date/Time    HEPATITIS B CORS AB,IGM Negative 06/29/2016 02:03 PM    HEPATITIS B SURFACE ANTIGEN Negative 06/29/2016 02:03 PM     Lab Results   Component Value Date/Time    SODIUM 141 05/22/2017 03:18 PM    POTASSIUM 4.4  05/22/2017 03:18 PM    CHLORIDE 106 05/22/2017 03:18 PM    CO2 28 05/22/2017 03:18 PM    GLUCOSE 176* 05/22/2017 03:18 PM    BUN 32* 05/22/2017 03:18 PM    CREATININE 0.97 05/22/2017 03:18 PM      Lab Results   Component Value Date/Time    WBC 6.3 05/22/2017 03:18 PM    RBC 4.63 05/22/2017 03:18 PM    HEMOGLOBIN 14.2 05/22/2017 03:18 PM    HEMATOCRIT 43.2 05/22/2017 03:18 PM    MCV 93.3 05/22/2017 03:18 PM    MCH 30.7 05/22/2017 03:18 PM    MCHC 32.9* 05/22/2017 03:18 PM    MPV 11.5 05/22/2017 03:18 PM    NEUTROPHILS-POLYS 70.30 05/22/2017 03:18 PM    LYMPHOCYTES 21.60* 05/22/2017 03:18 PM    MONOCYTES 7.40 05/22/2017 03:18 PM    EOSINOPHILS 0.20 05/22/2017 03:18 PM    BASOPHILS 0.30 05/22/2017 03:18 PM      Lab Results   Component Value Date/Time    CALCIUM 9.2 05/22/2017 03:18 PM    AST(SGOT) 22 05/22/2017 03:18 PM    ALT(SGPT) 28 05/22/2017 03:18 PM    ALKALINE PHOSPHATASE 55 05/22/2017 03:18 PM    TOTAL BILIRUBIN 1.0 05/22/2017 03:18 PM    ALBUMIN 4.1 05/22/2017 03:18 PM    TOTAL PROTEIN 6.9 05/22/2017 03:18 PM     Lab Results   Component Value Date/Time    URIC ACID 4.5 06/29/2016 02:03 PM    RHEUMATOID FACTOR -NEPH- <10 06/29/2016 02:03 PM    CCP ANTIBODIES 4 06/29/2016 02:03 PM     Lab Results   Component Value Date/Time    SED RATE WESTERGREN 14 05/22/2017 03:18 PM     Lab Results   Component Value Date/Time    GLYCOHEMOGLOBIN 5.5 05/16/2016 07:50 AM     Lab Results   Component Value Date/Time    CPK TOTAL 61 06/29/2016 02:03 PM     Results for orders placed during the hospital encounter of 03/18/16   DX-JOINT SURVEY-HANDS SINGLE VIEW    Impression Multiple bilateral sites of osteoarthritis     Results for orders placed during the hospital encounter of 03/18/16   DX-JOINT SURVEY-FEET SINGLE VIEW    Impression 1.  Bilateral midfoot and forefoot osteoarthritis    2.  Bilateral bunion    3.  Prior fusion across the right 2nd proximal interphalangeal joint    4.  Foreign body or opaque material between 1st and  2nd phalanges     Results for orders placed during the hospital encounter of 03/18/16   DS-BONE DENSITY STUDY (DEXA)    Impression According to the World Health Organization classification, bone mineral density of this patient is osteopenia with increased risk of fracture.        10-year Probability of Fracture:  Major Osteoporotic     14.3%  Hip     3.7%  Population      [place] ()    Based on left femur neck BMD          INTERPRETING LOCATION:  [address] [place] [address], [address]     Results for orders placed during the hospital encounter of 01/14/09   DX-KNEE COMPLETE 4+    Impression IMPRESSION:     MILD PATELLOFEMORAL AND MEDIAL FEMOROTIBIAL COMPARTMENT DEGENERATIVE   OSTEOARTHROSIS.        GE[facility]     Read By ALEX WISE MD on Jan 14 2009 10:01AM  : DANA Transcription Date: Jovi 15 2009  8:11AM  THIS DOCUMENT HAS BEEN ELECTRONICALLY SIGNED BY: ALEX WISE MD on   Jan 16 2009  1:32PM        Results for orders placed during the hospital encounter of 01/14/09   DX-SHOULDER 2+    Impression IMPRESSION:     NORMAL RADIOGRAPHS OF THE LEFT SHOULDER WITH NOTE MADE OF MINIMAL   DEGENERATIVE OSTEOARTHROSIS OF THE GLENOHUMERAL JOINT WITH MINIMAL   SPURRING.           Results for orders placed during the hospital encounter of 05/12/17   MR-LUMBAR SPINE-W/O    Impression 1.  Mild spinal stenosis at L3-4    2.  Multilevel foraminal stenoses describing the findings section    3.  Degenerative subluxations at T12-L1, L1-2, and L4-5    4.  Mild edematous endplate change adjacent to T12-L1    5.  Incidental hemangioma within the left side of L4    6.  8 mm focus of marrow placement within L2. Differential diagnosis includes atypical hemangioma or a metastasis. Significance of this finding depends almost exclusively on whether there is a history of known malignancy.     Results for orders placed during the hospital encounter of 01/14/09   DX-CERVICAL SPINE-2 OR 3 VIEWS    Impression IMPRESSION:          DEGENERATIVE DISC AND FACET ARTHROPATHY C5-6, C6-7, AND DEGENERATIVE   FACET ARTHROPATHY AT C7-T1.           Assessment and Plan:     1. Rheumatoid arthritis, involving unspecified site, unspecified rheumatoid factor presence (CMS-HCC)  Currently doing well with Humira at 40 mg subcutaneous every 2 weeks and methotrexate 15 mg by mouth every week and folic acid 1 mg by mouth daily, patient is going to be going on vacation, and next visit when patient comes back from vacation consider decreasing methotrexate.  - hydrocodone-acetaminophen (NORCO) 5-325 MG Tab per tablet; 1 tab po bid for severe joint pain, NO ALCOHOL and NO DRIVING within 24 hrs of taking this medication, NO BENZODIAZEPINE medications, no selling or giving to any other persons  Dispense: 30 Tab; Refill: 0  - tizanidine (ZANAFLEX) 2 MG capsule; 1-2 tabs po qhs for muscle relaxer  Dispense: 60 Cap; Refill: 2    2. Osteopenia of multiple sites  Last DEXA March 2016 next DEXA March 2018   continue calcium 1200 mg by mouth daily and vitamin D about 1000 units by mouth daily and magnesium 400 mg by mouth daily  - hydrocodone-acetaminophen (NORCO) 5-325 MG Tab per tablet; 1 tab po bid for severe joint pain, NO ALCOHOL and NO DRIVING within 24 hrs of taking this medication, NO BENZODIAZEPINE medications, no selling or giving to any other persons  Dispense: 30 Tab; Refill: 0  - tizanidine (ZANAFLEX) 2 MG capsule; 1-2 tabs po qhs for muscle relaxer  Dispense: 60 Cap; Refill: 2    3. Spasm  Do a trial tizanidine 2-4 mg by mouth daily at bedtime  - tizanidine (ZANAFLEX) 2 MG capsule; 1-2 tabs po qhs for muscle relaxer  Dispense: 60 Cap; Refill: 2    4. Essential hypertension, benign  May impact the type of medications we can use for this patient's arthritis. We will have to keep this under advisement.    5. Chronic atrial fibrillation (CMS-HCC)  On chronic anticoagulation  This will impact with kind of medications we can use for this patient's arthritis,  NSAIDs are contraindicated because of increased risk of bleeding while on chronic anticoagulation    6. Long term current use of anticoagulants with INR goal of 2.0-3.0  This will impact with kind of medications we can use for this patient's arthritis, NSAIDs are contraindicated because of increased risk of bleeding while on chronic anticoagulation    7. Chronic bilateral low back pain without sciatica  Status post bilateral sacroiliac joint cortisone injections with benefit.    Followup: Return in about 3 months (around 9/1/2017). or sooner prn    Patient was seen 30 minutes face-to-face of which more than 50% of the time was spent counseling the patient (excluding time for procedures)  regarding  rheumatological condition and care. Therapy was discussed in detail.    Please note that this dictation was created using voice recognition software. I have made every reasonable attempt to correct obvious errors, but I expect that there are errors of grammar and possibly content that I did not discover before finalizing the note.

## 2017-06-01 NOTE — PROGRESS NOTES
Chief Complaint- joint pain    Subjective:   Marry Mathur is a 81 y.o. female here today for follow up of rheumatological issues    Pt here with a dx of RA at Dr Peguero office currently on  MTX 15 mg po qweek and Humira injections 40 mg subcutaneous every 2 weeks.  No psoriaisis, positive skin cancer, positive melanoma 2001, no iritis/uveitis, no FUO, no weight loss, no recurrent infections, no colitis, no recurrent infections, no new neurological disorders, no fevers of unknown etiology. Since last visit, patient status post cortisone injections in bilateral sacroiliac joints and she feels really quite good and able to ambulate. Patient is scheduled to go [place] and is anxious about going.   Pt also diabetic, takes metformin, also with a diagnosis of spinal stenosis with intermittent weakness in her legs. Patient currently undergoing physical therapy for balance and strength as in her legs, thinks it is helping her. Denies any new rashes, denies any unexplained weight loss, denies any new skin cancers, is followed very closely by her dermatologist regarding skin cancers. Patient states her predominant issues low back problems patient's of low back problems for years, undergoes physical therapy 3 times a week already.    S/p Remicade-stopped because of hx of melanoma about 1996 and has multiple other skin cancers  S/p Orencia-lost efficacy      Hep B neg 6/2016  Hand X-rays 3/2016  Indicates DJD  Feet x-rays 3/2016  Indicates DJD  DEXA 3/2016 T scores 1.1, -1.1   Echocardiogram 7/2012 [facility]  RF neg 3/2014 [facility]; RF neg 6/2014 [facility]; RF neg 6/2016  CCP neg 3/2014 [facility]; CCP neg 6/2014 [facility]; CCP neg 6/2016  Uric acid 4.7 3/2014 [facility];Uric acid 4.5 6/2016  Quantiferon Gold neg 6/2014 [facility]; Quantiferon Gold neg 6/2016  Hand x-rays 3/2016-indicate osteoarthritis  Feet x-rays-indicate osteoarthritis        Current medicines  (including changes today)  Current Outpatient Prescriptions   Medication Sig Dispense Refill   • hydrocodone-acetaminophen (NORCO) 5-325 MG Tab per tablet 1 tab po bid for severe joint pain, NO ALCOHOL and NO DRIVING within 24 hrs of taking this medication, NO BENZODIAZEPINE medications, no selling or giving to any other persons 30 Tab 0   • tizanidine (ZANAFLEX) 2 MG capsule 1-2 tabs po qhs for muscle relaxer 60 Cap 2   • omeprazole (PRILOSEC) 20 MG delayed-release capsule Take 1 Cap by mouth every day. 90 Cap 3   • metformin (GLUCOPHAGE) 500 MG Tab Take 1 Tab by mouth every day. 90 Tab 3   • methotrexate 2.5 MG Tab 6 tabs po q wednesday 72 Tab 0   • warfarin (COUMADIN) 2.5 MG Tab Take 1 to 2 tablets by mouth daily as directed by the coumadin clinic 180 Tab 1   • pregabalin (LYRICA) 75 MG Cap Take 1 Cap by mouth 2 times a day. 180 Cap 1   • Adalimumab 40 MG/0.8ML Prefilled Syringe Kit Inject 0.8 mL as instructed every 14 days. 6 Each 1   • acetaminophen (TYLENOL) 325 MG Tab Take 650 mg by mouth at bedtime as needed.     • folic acid (FOLVITE) 400 MCG tablet Take 400 mcg by mouth every day.     • valsartan-hydrochlorothiazide (DIOVAN HCT) 80-12.5 MG per tablet Take 0.5 Tabs by mouth every day.     • vitamin D (CHOLECALCIFEROL) 1000 UNIT Tab Take 1,000 Units by mouth every day.     • atorvastatin (LIPITOR) 20 MG Tab Take 20 mg by mouth every evening.     • POTASSIUM GLUCONATE Take 1 Tab by mouth every day.     • magnesium oxide (MAG-OX) 400 MG TABS Take 400 mg by mouth 2 times a day.     • Calcium Carbonate-Vitamin D (CALCIUM + D PO) Take 1 Tab by mouth every day.     • lidocaine (LIDODERM) 5 % Patch Apply 1 Patch to skin as directed every 24 hours. 90 Patch 1     No current facility-administered medications for this visit.     She  has a past medical history of Prediabetes; Hyperlipidemia; Hypertension; Rheumatoid arthritis with rheumatoid factor (CMS-HCC); Hiatus hernia syndrome; Cancer (CMS-HCC); Chronic back pain  greater than 3 months duration; and Blood clotting disorder (CMS-HCC) (2012).    ROS   Other than what is mentioned in HPI or physical exam, there is no history of headaches, double vision or blurred vision. No temporal tenderness or jaw claudication. No history of cataracts or glaucoma. No trouble swallowing difficulties or sore throats. No history of thyroid disease. No chest complaints including chest pain, cough or sputum production. No shortness of breath. No GI complaints including nausea, vomiting, change in bowel habits, or past peptic ulcer disease. No history of blood in the stools. No urinary complaints including dysuria or frequency. No history of rash including psoriasis. No history of alopecia, photosensitivity, oral ulcerations, Raynaud's phenomena, or swollen joints. No history of gout. No back complaints. No history of low blood counts.       Objective:     Blood pressure 128/70, pulse 58, temperature 36.6 °C (97.9 °F), resp. rate 16, weight 71.668 kg (158 lb), SpO2 97 %. Body mass index is 28 kg/(m^2).   Physical Exam:  Constitutional: Alert and oriented X3,.Skin: Warm, dry, good turgor, no rashes in visible areas, multiple areas of resection of skin cancersEye: Equal, round and reactive, conjunctiva clear, lids normal EOM intact , mild arcus senilis bilaterallyENMT: Lips without lesions, good dentition, no oropharyngeal ulcers, moist buccal mucosa, pinna without deformityNeck: Trachea midline, no masses, no thyromegaly.Lymph:  No cervical lymphadenopathy, no axillary lymphadenopathy, no supraclavicular lymphadenopathyRespiratory: Unlabored respiratory effort, lungs clear to auscultation, no wheezes, no ronchi.Cardiovascular: Normal S1, S2, patient has a 3/6 systolic ejection murmur heard both at the right and left sternal borders, no edema.Abdomen: Soft, non-tender, no masses, no hepatosplenomegaly.Psych: Alert and oriented x3, normal affect and mood.Neuro: Cranial nerves 2-12 are grossly intact,  no loss of sensation LEExt:no joint laxity noted in bilateral arms, no joint laxity noted in bilateral legs, gait without antalgia and without foot drop, there is evidence of Heberden's nodes especially in the index DIP joint, no christin swan-neck or boutonniere deformities, no ulnar deviation, no flexure contractures in the elbows, knees with crepitus bilaterally but no effusions, no Achilles tendon inflammation, some mild synovitis noted on the right middle PIP joint, and left index PIP joint    Lab Results   Component Value Date/Time    QUANTIFERON TB GOLD Negative 06/29/2016 02:03 PM     Lab Results   Component Value Date/Time    HEPATITIS B CORS AB,IGM Negative 06/29/2016 02:03 PM    HEPATITIS B SURFACE ANTIGEN Negative 06/29/2016 02:03 PM     Lab Results   Component Value Date/Time    SODIUM 141 05/22/2017 03:18 PM    POTASSIUM 4.4 05/22/2017 03:18 PM    CHLORIDE 106 05/22/2017 03:18 PM    CO2 28 05/22/2017 03:18 PM    GLUCOSE 176* 05/22/2017 03:18 PM    BUN 32* 05/22/2017 03:18 PM    CREATININE 0.97 05/22/2017 03:18 PM      Lab Results   Component Value Date/Time    WBC 6.3 05/22/2017 03:18 PM    RBC 4.63 05/22/2017 03:18 PM    HEMOGLOBIN 14.2 05/22/2017 03:18 PM    HEMATOCRIT 43.2 05/22/2017 03:18 PM    MCV 93.3 05/22/2017 03:18 PM    MCH 30.7 05/22/2017 03:18 PM    MCHC 32.9* 05/22/2017 03:18 PM    MPV 11.5 05/22/2017 03:18 PM    NEUTROPHILS-POLYS 70.30 05/22/2017 03:18 PM    LYMPHOCYTES 21.60* 05/22/2017 03:18 PM    MONOCYTES 7.40 05/22/2017 03:18 PM    EOSINOPHILS 0.20 05/22/2017 03:18 PM    BASOPHILS 0.30 05/22/2017 03:18 PM      Lab Results   Component Value Date/Time    CALCIUM 9.2 05/22/2017 03:18 PM    AST(SGOT) 22 05/22/2017 03:18 PM    ALT(SGPT) 28 05/22/2017 03:18 PM    ALKALINE PHOSPHATASE 55 05/22/2017 03:18 PM    TOTAL BILIRUBIN 1.0 05/22/2017 03:18 PM    ALBUMIN 4.1 05/22/2017 03:18 PM    TOTAL PROTEIN 6.9 05/22/2017 03:18 PM     Lab Results   Component Value Date/Time    URIC ACID 4.5  06/29/2016 02:03 PM    RHEUMATOID FACTOR -NEPH- <10 06/29/2016 02:03 PM    CCP ANTIBODIES 4 06/29/2016 02:03 PM     Lab Results   Component Value Date/Time    SED RATE RUTHREN 14 05/22/2017 03:18 PM     Lab Results   Component Value Date/Time    GLYCOHEMOGLOBIN 5.5 05/16/2016 07:50 AM     Lab Results   Component Value Date/Time    CPK TOTAL 61 06/29/2016 02:03 PM     Results for orders placed during the hospital encounter of 03/18/16   DX-JOINT SURVEY-HANDS SINGLE VIEW    Impression Multiple bilateral sites of osteoarthritis     Results for orders placed during the hospital encounter of 03/18/16   DX-JOINT SURVEY-FEET SINGLE VIEW    Impression 1.  Bilateral midfoot and forefoot osteoarthritis    2.  Bilateral bunion    3.  Prior fusion across the right 2nd proximal interphalangeal joint    4.  Foreign body or opaque material between 1st and 2nd phalanges     Results for orders placed during the hospital encounter of 03/18/16   DS-BONE DENSITY STUDY (DEXA)    Impression According to the World Health Organization classification, bone mineral density of this patient is osteopenia with increased risk of fracture.        10-year Probability of Fracture:  Major Osteoporotic     14.3%  Hip     3.7%  Population      [place] ()    Based on left femur neck BMD          INTERPRETING LOCATION:  [address] [place] [address], [id number]     Results for orders placed during the hospital encounter of 01/14/09   DX-KNEE COMPLETE 4+    Impression IMPRESSION:     MILD PATELLOFEMORAL AND MEDIAL FEMOROTIBIAL COMPARTMENT DEGENERATIVE   OSTEOARTHROSIS.        GEK:dana     Read By ALEX WISE MD on Jan 14 2009 10:01AM  : DANA Transcription Date: Jovi 15 2009  8:11AM  THIS DOCUMENT HAS BEEN ELECTRONICALLY SIGNED BY: ALEX WISE MD on   Jan 16 2009  1:32PM        Results for orders placed during the hospital encounter of 01/14/09   DX-SHOULDER 2+    Impression IMPRESSION:     NORMAL RADIOGRAPHS OF THE LEFT SHOULDER  WITH NOTE MADE OF MINIMAL   DEGENERATIVE OSTEOARTHROSIS OF THE GLENOHUMERAL JOINT WITH MINIMAL   SPURRING.           Results for orders placed during the hospital encounter of 05/12/17   MR-LUMBAR SPINE-W/O    Impression 1.  Mild spinal stenosis at L3-4    2.  Multilevel foraminal stenoses describing the findings section    3.  Degenerative subluxations at T12-L1, L1-2, and L4-5    4.  Mild edematous endplate change adjacent to T12-L1    5.  Incidental hemangioma within the left side of L4    6.  8 mm focus of marrow placement within L2. Differential diagnosis includes atypical hemangioma or a metastasis. Significance of this finding depends almost exclusively on whether there is a history of known malignancy.     Results for orders placed during the hospital encounter of 01/14/09   DX-CERVICAL SPINE-2 OR 3 VIEWS    Impression IMPRESSION:     DEGENERATIVE DISC AND FACET ARTHROPATHY C5-6, C6-7, AND DEGENERATIVE   FACET ARTHROPATHY AT C7-T1.           Assessment and Plan:     1. Rheumatoid arthritis, involving unspecified site, unspecified rheumatoid factor presence (CMS-[facility])  Currently doing well with Humira at 40 mg subcutaneous every 2 weeks and methotrexate 15 mg by mouth every week and folic acid 1 mg by mouth daily, patient is going to be going on vacation, and next visit when patient comes back from vacation consider decreasing methotrexate.  - hydrocodone-acetaminophen (NORCO) 5-325 MG Tab per tablet; 1 tab po bid for severe joint pain, NO ALCOHOL and NO DRIVING within 24 hrs of taking this medication, NO BENZODIAZEPINE medications, no selling or giving to any other persons  Dispense: 30 Tab; Refill: 0  - tizanidine (ZANAFLEX) 2 MG capsule; 1-2 tabs po qhs for muscle relaxer  Dispense: 60 Cap; Refill: 2    2. Osteopenia of multiple sites  Last DEXA March 2016 next DEXA March 2018   continue calcium 1200 mg by mouth daily and vitamin D about 1000 units by mouth daily and magnesium 400 mg by mouth daily  -  hydrocodone-acetaminophen (NORCO) 5-325 MG Tab per tablet; 1 tab po bid for severe joint pain, NO ALCOHOL and NO DRIVING within 24 hrs of taking this medication, NO BENZODIAZEPINE medications, no selling or giving to any other persons  Dispense: 30 Tab; Refill: 0  - tizanidine (ZANAFLEX) 2 MG capsule; 1-2 tabs po qhs for muscle relaxer  Dispense: 60 Cap; Refill: 2    3. Spasm  Do a trial tizanidine 2-4 mg by mouth daily at bedtime  - tizanidine (ZANAFLEX) 2 MG capsule; 1-2 tabs po qhs for muscle relaxer  Dispense: 60 Cap; Refill: 2    4. Essential hypertension, benign  May impact the type of medications we can use for this patient's arthritis. We will have to keep this under advisement.    5. Chronic atrial fibrillation (CMS-HCC)  On chronic anticoagulation  This will impact with kind of medications we can use for this patient's arthritis, NSAIDs are contraindicated because of increased risk of bleeding while on chronic anticoagulation    6. Long term current use of anticoagulants with INR goal of 2.0-3.0  This will impact with kind of medications we can use for this patient's arthritis, NSAIDs are contraindicated because of increased risk of bleeding while on chronic anticoagulation    7. Chronic bilateral low back pain without sciatica  Status post bilateral sacroiliac joint cortisone injections with benefit.    Followup: Return in about 3 months (around 9/1/2017). or sooner prn    Patient was seen 30 minutes face-to-face of which more than 50% of the time was spent counseling the patient (excluding time for procedures)  regarding  rheumatological condition and care. Therapy was discussed in detail.    Please note that this dictation was created using voice recognition software. I have made every reasonable attempt to correct obvious errors, but I expect that there are errors of grammar and possibly content that I did not discover before finalizing the note.

## 2017-06-08 DIAGNOSIS — C43.9 MALIGNANT MELANOMA, UNSPECIFIED SITE (HCC): ICD-10-CM

## 2017-06-14 DIAGNOSIS — Z79.01 LONG TERM CURRENT USE OF ANTICOAGULANTS WITH INR GOAL OF 2.0-3.0: ICD-10-CM

## 2017-06-15 RX ORDER — VALSARTAN AND HYDROCHLOROTHIAZIDE 80; 12.5 MG/1; MG/1
0.5 TABLET, FILM COATED ORAL DAILY
Qty: 45 TAB | Refills: 1 | Status: SHIPPED | OUTPATIENT
Start: 2017-06-15 | End: 2017-11-02

## 2017-06-15 RX ORDER — ATORVASTATIN CALCIUM 20 MG/1
20 TABLET, FILM COATED ORAL DAILY
Qty: 90 TAB | Refills: 1 | Status: SHIPPED | OUTPATIENT
Start: 2017-06-15 | End: 2017-11-02

## 2017-06-15 NOTE — TELEPHONE ENCOUNTER
Pt has had OV within the 12 month protocol and lipid panel is current. 6 month supply sent to pharmacy.   Lab Results   Component Value Date/Time    CHOLESTEROL, 02/28/2017 06:37 AM    LDL 84 02/28/2017 06:37 AM    HDL 54 02/28/2017 06:37 AM    TRIGLYCERIDES 67 02/28/2017 06:37 AM       Lab Results   Component Value Date/Time    SODIUM 141 05/22/2017 03:18 PM    POTASSIUM 4.4 05/22/2017 03:18 PM    CHLORIDE 106 05/22/2017 03:18 PM    CO2 28 05/22/2017 03:18 PM    GLUCOSE 176* 05/22/2017 03:18 PM    BUN 32* 05/22/2017 03:18 PM    CREATININE 0.97 05/22/2017 03:18 PM     Lab Results   Component Value Date/Time    ALKALINE PHOSPHATASE 55 05/22/2017 03:18 PM    AST(SGOT) 22 05/22/2017 03:18 PM    ALT(SGPT) 28 05/22/2017 03:18 PM    TOTAL BILIRUBIN 1.0 05/22/2017 03:18 PM

## 2017-06-16 ENCOUNTER — ANTICOAGULATION MONITORING (OUTPATIENT)
Dept: VASCULAR LAB | Facility: MEDICAL CENTER | Age: 82
End: 2017-06-16

## 2017-06-16 DIAGNOSIS — I48.20 CHRONIC ATRIAL FIBRILLATION (HCC): ICD-10-CM

## 2017-06-16 DIAGNOSIS — Z79.01 LONG TERM CURRENT USE OF ANTICOAGULANTS WITH INR GOAL OF 2.0-3.0: ICD-10-CM

## 2017-06-16 LAB — INR PPP: 2.8 (ref 2–3.5)

## 2017-06-21 ENCOUNTER — HOSPITAL ENCOUNTER (OUTPATIENT)
Dept: RADIOLOGY | Facility: MEDICAL CENTER | Age: 82
End: 2017-06-21
Attending: INTERNAL MEDICINE
Payer: MEDICARE

## 2017-06-21 DIAGNOSIS — C43.9 MALIGNANT MELANOMA, UNSPECIFIED SITE (HCC): ICD-10-CM

## 2017-06-21 PROCEDURE — A9552 F18 FDG: HCPCS

## 2017-06-22 ENCOUNTER — TELEPHONE (OUTPATIENT)
Dept: RHEUMATOLOGY | Facility: PHYSICIAN GROUP | Age: 82
End: 2017-06-22

## 2017-06-22 NOTE — TELEPHONE ENCOUNTER
Please notify patient her PET looks great, no evidence of any kind of cancer! (History of melanoma)  We will see her at her upcoming appointment August 31, 2017, patient can come in sooner if she would like

## 2017-06-26 ENCOUNTER — HOSPITAL ENCOUNTER (OUTPATIENT)
Dept: RADIOLOGY | Facility: MEDICAL CENTER | Age: 82
End: 2017-06-26
Attending: PHYSICAL MEDICINE & REHABILITATION
Payer: MEDICARE

## 2017-06-26 DIAGNOSIS — R10.2 PAIN IN FEMALE PELVIS: ICD-10-CM

## 2017-06-26 PROCEDURE — 72193 CT PELVIS W/DYE: CPT

## 2017-06-26 PROCEDURE — 700117 HCHG RX CONTRAST REV CODE 255: Performed by: PHYSICAL MEDICINE & REHABILITATION

## 2017-06-26 RX ADMIN — IOHEXOL 100 ML: 350 INJECTION, SOLUTION INTRAVENOUS at 15:45

## 2017-06-28 ENCOUNTER — TELEPHONE (OUTPATIENT)
Dept: VASCULAR LAB | Facility: MEDICAL CENTER | Age: 82
End: 2017-06-28

## 2017-06-28 NOTE — TELEPHONE ENCOUNTER
Unable to leave INR voicemail reminder message - voicemailbox is not yet set up   Rachel Alejandra, PHARMD

## 2017-07-03 ENCOUNTER — ANTICOAGULATION MONITORING (OUTPATIENT)
Dept: VASCULAR LAB | Facility: MEDICAL CENTER | Age: 82
End: 2017-07-03

## 2017-07-03 DIAGNOSIS — I48.91 ATRIAL FIBRILLATION, UNSPECIFIED TYPE (HCC): ICD-10-CM

## 2017-07-03 DIAGNOSIS — Z79.01 LONG TERM CURRENT USE OF ANTICOAGULANTS WITH INR GOAL OF 2.0-3.0: ICD-10-CM

## 2017-07-03 LAB — INR PPP: 2.3 (ref 2–3.5)

## 2017-07-03 NOTE — PROGRESS NOTES
Anticoagulation Summary as of 7/3/2017     INR goal 2.0-3.0   Selected INR 2.3 (7/3/2017)   Maintenance plan 2.5 mg (2.5 mg x 1) on Mon, Fri; 5 mg (2.5 mg x 2) all other days   Weekly total 30 mg   Plan last modified Toney Spicer, PHARMD (12/5/2016)   Next INR check 7/17/2017   Target end date     Indications   Long term current use of anticoagulants with INR goal of 2.0-3.0 [Z79.01]  Deep venous thrombosis  left [I82.402]  Atrial fibrillation [I48.91] [I48.91]         Anticoagulation Episode Summary     INR check location Home Draw    Preferred lab     Send INR reminders to     Comments       Anticoagulation Care Providers     Provider Role Specialty Phone number    ROWAN [email or web address].VICTOR HUGO. Referring Family Medicine [phone]    Rachel Alejandra, PHARMD Responsible          Anticoagulation Patient Findings  spoke with Marry to report a therapeutic INR of 2.3. Pt is to continue with current warfarin dosing regimen.  Pt denies any unusual s/s of bleeding, bruising, clotting or any changes to diet or medications.  Follow up in 2 weeks.    Rachel Alejandra, PHARMD

## 2017-07-05 ENCOUNTER — HOSPITAL ENCOUNTER (OUTPATIENT)
Dept: RADIOLOGY | Facility: MEDICAL CENTER | Age: 82
End: 2017-07-05
Attending: PHYSICAL MEDICINE & REHABILITATION
Payer: MEDICARE

## 2017-07-05 DIAGNOSIS — R10.2 PELVIC PAIN: ICD-10-CM

## 2017-07-05 PROCEDURE — 75635 CT ANGIO ABDOMINAL ARTERIES: CPT

## 2017-07-05 PROCEDURE — 700117 HCHG RX CONTRAST REV CODE 255: Performed by: PHYSICAL MEDICINE & REHABILITATION

## 2017-07-05 RX ADMIN — IOHEXOL 100 ML: 350 INJECTION, SOLUTION INTRAVENOUS at 16:00

## 2017-07-07 DIAGNOSIS — M85.89 OSTEOPENIA OF MULTIPLE SITES: ICD-10-CM

## 2017-07-07 DIAGNOSIS — M06.9 RHEUMATOID ARTHRITIS, INVOLVING UNSPECIFIED SITE, UNSPECIFIED RHEUMATOID FACTOR PRESENCE: ICD-10-CM

## 2017-07-07 RX ORDER — HYDROCODONE BITARTRATE AND ACETAMINOPHEN 5; 325 MG/1; MG/1
TABLET ORAL
Qty: 30 TAB | Refills: 0 | Status: CANCELLED | OUTPATIENT
Start: 2017-07-07

## 2017-07-07 RX ORDER — HYDROCODONE BITARTRATE AND ACETAMINOPHEN 5; 325 MG/1; MG/1
TABLET ORAL
Qty: 30 TAB | Refills: 0 | Status: SHIPPED | OUTPATIENT
Start: 2017-07-07 | End: 2017-08-09 | Stop reason: SDUPTHER

## 2017-07-07 NOTE — TELEPHONE ENCOUNTER
Was the patient seen in the last year in this department? Yes     Does patient have an active prescription for medications requested? No     Received Request Via: Patient     Pt would like to  this afternoon.  Thank you

## 2017-07-11 DIAGNOSIS — M06.9 RHEUMATOID ARTHRITIS, INVOLVING UNSPECIFIED SITE, UNSPECIFIED RHEUMATOID FACTOR PRESENCE: ICD-10-CM

## 2017-07-11 NOTE — TELEPHONE ENCOUNTER
Was the patient seen in the last year in this department? Yes   May labs    Does patient have an active prescription for medications requested? No     Received Request Via: Pharmacy

## 2017-07-20 ENCOUNTER — OFFICE VISIT (OUTPATIENT)
Dept: MEDICAL GROUP | Facility: PHYSICIAN GROUP | Age: 82
End: 2017-07-20
Payer: MEDICARE

## 2017-07-20 ENCOUNTER — ANTICOAGULATION MONITORING (OUTPATIENT)
Dept: VASCULAR LAB | Facility: MEDICAL CENTER | Age: 82
End: 2017-07-20

## 2017-07-20 VITALS
WEIGHT: 160 LBS | HEART RATE: 76 BPM | HEIGHT: 63 IN | OXYGEN SATURATION: 97 % | SYSTOLIC BLOOD PRESSURE: 116 MMHG | TEMPERATURE: 98.4 F | DIASTOLIC BLOOD PRESSURE: 68 MMHG | RESPIRATION RATE: 12 BRPM | BODY MASS INDEX: 28.35 KG/M2

## 2017-07-20 DIAGNOSIS — M54.50 CHRONIC BILATERAL LOW BACK PAIN WITHOUT SCIATICA: Primary | ICD-10-CM

## 2017-07-20 DIAGNOSIS — G89.29 CHRONIC BILATERAL LOW BACK PAIN WITHOUT SCIATICA: Primary | ICD-10-CM

## 2017-07-20 DIAGNOSIS — I48.91 ATRIAL FIBRILLATION, UNSPECIFIED TYPE (HCC): ICD-10-CM

## 2017-07-20 DIAGNOSIS — M79.2 NEUROGENIC PAIN OF LEFT FOOT: ICD-10-CM

## 2017-07-20 DIAGNOSIS — Z79.01 LONG TERM CURRENT USE OF ANTICOAGULANTS WITH INR GOAL OF 2.0-3.0: ICD-10-CM

## 2017-07-20 LAB — INR PPP: 3.3 (ref 2–3.5)

## 2017-07-20 PROCEDURE — 99213 OFFICE O/P EST LOW 20 MIN: CPT | Performed by: NURSE PRACTITIONER

## 2017-07-20 NOTE — PROGRESS NOTES
Chief Complaint   Patient presents with   • Other     DMV        HISTORY OF PRESENT ILLNESS: Patient is a 81 y.o. female established patient who presents today to discuss the followin. Chronic bilateral low back pain without sciatica  2. Neurogenic pain of left foot  Patient continues to suffer from chronic low back pain.  She is under the care of physiatrist, Dr. Cooper.  She underwent a successful SI joint injection couple of months ago.  She continues to suffer from extremity pain and bursitis.  More recently, she has been experiencing disabling left foot pain.  Pain is most prominent at night after she has had it elevated for at least an hour.  When she steps down to use the bathroom, she experiences significant pain and difficulty walking initially.  Pain improves with movement and walking however she always has baseline level of pain.  She is currently using Lyrica 75 mg twice a day as well as a pain pill at night as needed.  She had often supplements the pain pill with Tylenol.  She reports having a baseline pain level of 3-4 daily.  She tries to remain active and often does activities around the house each morning for several hours.  She is scheduled to follow-up with Dr. Cooper tomorrow to discuss ongoing pain relieving options.    Allergies:Sulfa drugs    Current Outpatient Prescriptions Ordered in [facility]   Medication Sig Dispense Refill   • Adalimumab 40 MG/0.8ML Prefilled Syringe Kit Inject 0.8 mL as instructed every 14 days. 6 Each 1   • hydrocodone-acetaminophen (NORCO) 5-325 MG Tab per tablet 1 tab po bid for severe joint pain, NO ALCOHOL , NO DRIVING and NO MARIJUANA  within 24 hrs of taking this medication, NO BENZODIAZEPINE medications, no selling or giving to any other persons 30 Tab 0   • valsartan-hydrochlorothiazide (DIOVAN HCT) 80-12.5 MG per tablet Take 0.5 Tabs by mouth every day. 45 Tab 1   • atorvastatin (LIPITOR) 20 MG Tab Take 1 Tab by mouth every day. 90 Tab 1   • tizanidine  (ZANAFLEX) 2 MG capsule 1-2 tabs po qhs for muscle relaxer 60 Cap 2   • omeprazole (PRILOSEC) 20 MG delayed-release capsule Take 1 Cap by mouth every day. 90 Cap 3   • metformin (GLUCOPHAGE) 500 MG Tab Take 1 Tab by mouth every day. 90 Tab 3   • methotrexate 2.5 MG Tab 6 tabs po q wednesday 72 Tab 0   • warfarin (COUMADIN) 2.5 MG Tab Take 1 to 2 tablets by mouth daily as directed by the coumadin clinic 180 Tab 1   • pregabalin (LYRICA) 75 MG Cap Take 1 Cap by mouth 2 times a day. 180 Cap 1   • acetaminophen (TYLENOL) 325 MG Tab Take 650 mg by mouth at bedtime as needed.     • folic acid (FOLVITE) 400 MCG tablet Take 400 mcg by mouth every day.     • vitamin D (CHOLECALCIFEROL) 1000 UNIT Tab Take 1,000 Units by mouth every day.     • POTASSIUM GLUCONATE Take 1 Tab by mouth every day.     • magnesium oxide (MAG-OX) 400 MG TABS Take 400 mg by mouth 2 times a day.     • Calcium Carbonate-Vitamin D (CALCIUM + D PO) Take 1 Tab by mouth every day.     • lidocaine (LIDODERM) 5 % Patch Apply 1 Patch to skin as directed every 24 hours. 90 Patch 1     No current Epic-ordered facility-administered medications on file.       Past Medical History   Diagnosis Date   • Prediabetes    • Hyperlipidemia    • Hypertension    • Rheumatoid arthritis with rheumatoid factor (CMS-[facility])      Dr. Escoto   • Hiatus hernia syndrome    • Cancer (CMS-HCC)      skin   • Chronic back pain greater than 3 months duration    • Blood clotting disorder (CMS-HCC)      clot in leg       Social History   Substance Use Topics   • Smoking status: Never Smoker    • Smokeless tobacco: Never Used   • Alcohol Use: No       Family Status   Relation Status Death Age   • Mother     • Father       Family History   Problem Relation Age of Onset   • Cancer Mother      stomach   • Cancer Father      colon       ROS: see above  Review of Systems   Constitutional: Negative for fever, chills, weight loss and malaise/fatigue.   HENT: Negative for ear  "pain, nosebleeds, congestion, sore throat and neck pain.    Eyes: Negative for blurred vision.   Respiratory: Negative for cough, sputum production, shortness of breath and wheezing.    Cardiovascular: Negative for chest pain, palpitations, orthopnea and leg swelling.   Gastrointestinal: Negative for heartburn, nausea, vomiting and abdominal pain.   Genitourinary: Negative for dysuria, urgency and frequency.   Musculoskeletal: Negative for myalgias, back pain and joint pain.   Skin: Negative for rash and itching.   Neurological: Negative for dizziness, tingling, tremors, sensory change, focal weakness and headaches.   Endo/Heme/Allergies: Does not bruise/bleed easily.   Psychiatric/Behavioral: Negative for depression, suicidal ideas and memory loss.  The patient is not nervous/anxious and does not have insomnia.        Exam:  Blood pressure 116/68, pulse 76, temperature 36.9 °C (98.4 °F), resp. rate 12, height 1.6 m (5' 3\"), weight 72.576 kg (160 lb), SpO2 97 %.  General:  Well nourished, well developed female in NAD  Head is grossly normal.  Neck: Thyroid is not enlarged.  Pulmonary: Normal effort.   Cardiovascular: Regular rate.   Extremities: no clubbing, cyanosis, or edema.   Psych:  Mood and affect are normal.  Answering questions appropriately with good eye contact.      Please note that this dictation was created using voice recognition software. I have made every reasonable attempt to correct obvious errors, but I expect that there are errors of grammar and possibly content that I did not discover before finalizing the note.    Assessment/Plan:    1. Chronic bilateral low back pain without sciatica     2. Neurogenic pain of left foot          1.  Encouraged patient to discuss possible EMG with Dr. Cooper.  Recent vascular imaging was reviewed.  2.  Consider increasing Lyrica dose.  3.  DMV paperwork completed as requested.   "

## 2017-07-20 NOTE — PROGRESS NOTES
OP Anticoagulation Service Note    Date: 7/20/2017    Anticoagulation Summary as of 7/20/2017     INR goal 2.0-3.0   Selected INR 3.3! (7/20/2017)   Maintenance plan 2.5 mg (2.5 mg x 1) on Mon, Fri; 5 mg (2.5 mg x 2) all other days   Weekly total 30 mg   Plan last modified JUDY UriarteD (12/5/2016)   Next INR check 8/3/2017   Target end date     Indications   Long term current use of anticoagulants with INR goal of 2.0-3.0 [Z79.01]  Deep venous thrombosis  left [I82.402]  Atrial fibrillation [I48.91] [I48.91]         Anticoagulation Episode Summary     INR check location Home Draw    Preferred lab     Send INR reminders to     Comments       Anticoagulation Care Providers     Provider Role Specialty Phone number    CELIO Baeza. Referring Family Medicine [phone]    JUDY GallegosD Responsible          Anticoagulation Patient Findings   Positives Medication Changes    Negatives Missed Doses, Extra Doses, Antibiotic Use, Diet Changes, Dental/Other Procedures, Hospitalization, Bleeding Gums, Nose Bleeds, Blood in Urine, Blood in Stool, Any Bruising, Other Complaints          Plan:  INR is high today. Spoke with pt on the phone. Confirmed dosing regimen. No missed or extra doses taken. Patient denies sign/symptoms of bleeding/clotting. She started a vitamin B, which I would not expect to impact INR. Patient has been eating a consistent diet. Instructed pt to call clinic with any concerns of bleeding or thrombosis. Will have pt decrease tonight's dose to 2.5 mg then resume usual regimen. Follow up in 2 week(s)      Kemi Lopez, JUDYD

## 2017-07-20 NOTE — MR AVS SNAPSHOT
"        Marry Swanway   2017 1:00 PM   Office Visit   MRN: [id number]    Department:  [facility]   Dept Phone:  [phone]    Description:  Female : 1935   Provider:  ELBA Baeza           Reason for Visit     Other DMV       Allergies as of 2017     Allergen Noted Reactions    Sulfa Drugs 2012   Vomiting    RXN=young person      You were diagnosed with     Chronic bilateral low back pain without sciatica   [1750015]  -  Primary     Neurogenic pain of left foot   [3034090]         Vital Signs     Blood Pressure Pulse Temperature Respirations Height Weight    116/68 mmHg 76 36.9 °C (98.4 °F) 12 1.6 m (5' 3\") 72.576 kg (160 lb)    Body Mass Index Oxygen Saturation Smoking Status             28.35 kg/m2 97% Never Smoker          Basic Information     Date Of Birth Sex Race Ethnicity Preferred Language    1935 Female White Non- English      Your appointments     Aug 31, 2017 10:45 AM   Follow Up Visit with Deanna Escoto M.D.   [facility] (--)    [address], [address]  [place] [facility] [id number] [phone]           You will be receiving a confirmation call a few days before your appointment from our automated call confirmation system.              Problem List              ICD-10-CM Priority Class Noted - Resolved    Nonspecific abnormal electrocardiogram (ECG) (EKG) R94.31 High  2012 - Present    Right bundle branch block I45.10 High  2012 - Present    Essential hypertension, benign I10 High  2012 - Present    Other and unspecified hyperlipidemia E78.5 High  2012 - Present    Hyperglycemia R73.9 High  2012 - Present    GERD (gastroesophageal reflux disease) K21.9 High  2012 - Present    Back pain M54.9 High  2012 - Present    Long term current use of anticoagulants with INR goal of 2.0-3.0 Z79.01   3/8/2016 - Present    Deep venous thrombosis, left I82.402   3/8/2016 - Present    Rheumatoid " arthritis (CMS-[facility]) M06.9   3/14/2016 - Present    Atrial fibrillation [I48.91] I48.91   4/19/2016 - Present    Hernia, inguinal, right K40.90   9/23/2016 - Present      Health Maintenance        Date Due Completion Dates    IMM DTaP/Tdap/Td Vaccine (1 - Tdap) 8/27/1955 ---    COLONOSCOPY 8/27/1986 ---    IMM ZOSTER VACCINE 8/27/1996 ---    IMM PNEUMOCOCCAL 65+ (ADULT) LOW/MEDIUM RISK SERIES (2 of 2 - PPSV23) 3/8/2017 3/8/2016    MAMMOGRAM 3/18/2017 3/18/2016, 10/9/2006, 10/7/2005, 10/5/2004    IMM INFLUENZA (1) 9/1/2017 11/1/2016    BONE DENSITY 3/18/2021 3/18/2016            Current Immunizations     13-VALENT PCV PREVNAR 3/8/2016    Influenza Vaccine Adult HD 11/1/2016      Below and/or attached are the medications your provider expects you to take. Review all of your home medications and newly ordered medications with your provider and/or pharmacist. Follow medication instructions as directed by your provider and/or pharmacist. Please keep your medication list with you and share with your provider. Update the information when medications are discontinued, doses are changed, or new medications (including over-the-counter products) are added; and carry medication information at all times in the event of emergency situations     Allergies:  SULFA DRUGS - Vomiting               Medications  Valid as of: July 20, 2017 -  2:18 PM    Generic Name Brand Name Tablet Size Instructions for use    Acetaminophen (Tab) TYLENOL 325 MG Take 650 mg by mouth at bedtime as needed.        Adalimumab (Prefilled Syringe Kit) Adalimumab 40 MG/0.8ML Inject 0.8 mL as instructed every 14 days.        Atorvastatin Calcium (Tab) LIPITOR 20 MG Take 1 Tab by mouth every day.        Calcium Citrate-Vitamin D   Take 1 Tab by mouth every day.        Cholecalciferol (Tab) cholecalciferol 1000 UNIT Take 1,000 Units by mouth every day.        Folic Acid (Tab) FOLVITE 400 MCG Take 400 mcg by mouth every day.        Hydrocodone-Acetaminophen (Tab)  NORCO 5-325 MG 1 tab po bid for severe joint pain, NO ALCOHOL , NO DRIVING and NO MARIJUANA  within 24 hrs of taking this medication, NO BENZODIAZEPINE medications, no selling or giving to any other persons        Lidocaine (Patch) LIDODERM 5 % Apply 1 Patch to skin as directed every 24 hours.        Magnesium Oxide (Tab) MAG- MG Take 400 mg by mouth 2 times a day.        MetFORMIN HCl (Tab) GLUCOPHAGE 500 MG Take 1 Tab by mouth every day.        Methotrexate Sodium (Tab) methotrexate 2.5 MG 6 tabs po q wednesday        Omeprazole (CAPSULE DELAYED RELEASE) PRILOSEC 20 MG Take 1 Cap by mouth every day.        Potassium Gluconate   Take 1 Tab by mouth every day.        Pregabalin (Cap) LYRICA 75 MG Take 1 Cap by mouth 2 times a day.        TiZANidine HCl (Cap) ZANAFLEX 2 MG 1-2 tabs po qhs for muscle relaxer        Valsartan-Hydrochlorothiazide (Tab) DIOVAN-HCT 80-12.5 MG Take 0.5 Tabs by mouth every day.        Warfarin Sodium (Tab) COUMADIN 2.5 MG Take 1 to 2 tablets by mouth daily as directed by the coumadin clinic        .                 Medicines prescribed today were sent to:     [facility] - [facility] [address]    [address] [facility] [address]    Phone: [phone] Fax: [phone]    Open 24 Hours?: No    [facility] - [place], [place] - [address] AT [facility]    [address] [facility] [address]    Phone: [phone] Fax: [phone]    Open 24 Hours?: No    [id number] [place], [place] - [address]    [address] [address]    Phone: [phone] Fax: [phone]    Open 24 Hours?: No      Medication refill instructions:       If your prescription bottle indicates you have medication refills left, it is not necessary to call your provider’s office. Please contact your pharmacy and they will refill your medication.    If your prescription bottle indicates you do not have any refills left, you may request  refills at any time through one of the following ways: The online [employer] system (except Urgent Care), by calling your provider’s office, or by asking your pharmacy to contact your provider’s office with a refill request. Medication refills are processed only during regular business hours and may not be available until the next business day. Your provider may request additional information or to have a follow-up visit with you prior to refilling your medication.   *Please Note: Medication refills are assigned a new Rx number when refilled electronically. Your pharmacy may indicate that no refills were authorized even though a new prescription for the same medication is available at the pharmacy. Please request the medicine by name with the pharmacy before contacting your provider for a refill.           [employer] Access Code: Activation code not generated  Current [employer] Status: Active

## 2017-07-25 DIAGNOSIS — M06.30 RHEUMATOID NODULE (HCC): ICD-10-CM

## 2017-07-25 DIAGNOSIS — M06.9 RHEUMATOID ARTHRITIS, INVOLVING UNSPECIFIED SITE, UNSPECIFIED RHEUMATOID FACTOR PRESENCE: ICD-10-CM

## 2017-07-25 DIAGNOSIS — Z79.01 LONG TERM CURRENT USE OF ANTICOAGULANTS WITH INR GOAL OF 2.0-3.0: ICD-10-CM

## 2017-08-05 LAB — INR PPP: 4.6 (ref 2–3.5)

## 2017-08-07 ENCOUNTER — ANTICOAGULATION MONITORING (OUTPATIENT)
Dept: VASCULAR LAB | Facility: MEDICAL CENTER | Age: 82
End: 2017-08-07

## 2017-08-07 ENCOUNTER — HOSPITAL ENCOUNTER (OUTPATIENT)
Dept: LAB | Facility: MEDICAL CENTER | Age: 82
End: 2017-08-07
Attending: INTERNAL MEDICINE
Payer: MEDICARE

## 2017-08-07 DIAGNOSIS — M06.30 RHEUMATOID NODULE (HCC): ICD-10-CM

## 2017-08-07 DIAGNOSIS — I48.91 ATRIAL FIBRILLATION, UNSPECIFIED TYPE (HCC): ICD-10-CM

## 2017-08-07 DIAGNOSIS — Z79.01 LONG TERM CURRENT USE OF ANTICOAGULANTS WITH INR GOAL OF 2.0-3.0: ICD-10-CM

## 2017-08-07 LAB
ALBUMIN SERPL BCP-MCNC: 3.8 G/DL (ref 3.2–4.9)
ALBUMIN/GLOB SERPL: 1.5 G/DL
ALP SERPL-CCNC: 46 U/L (ref 30–99)
ALT SERPL-CCNC: 30 U/L (ref 2–50)
ANION GAP SERPL CALC-SCNC: 5 MMOL/L (ref 0–11.9)
AST SERPL-CCNC: 25 U/L (ref 12–45)
BASOPHILS # BLD AUTO: 0.8 % (ref 0–1.8)
BASOPHILS # BLD: 0.04 K/UL (ref 0–0.12)
BILIRUB SERPL-MCNC: 1.8 MG/DL (ref 0.1–1.5)
BUN SERPL-MCNC: 20 MG/DL (ref 8–22)
CALCIUM SERPL-MCNC: 8.9 MG/DL (ref 8.5–10.5)
CHLORIDE SERPL-SCNC: 103 MMOL/L (ref 96–112)
CO2 SERPL-SCNC: 30 MMOL/L (ref 20–33)
CREAT SERPL-MCNC: 0.74 MG/DL (ref 0.5–1.4)
EOSINOPHIL # BLD AUTO: 0.13 K/UL (ref 0–0.51)
EOSINOPHIL NFR BLD: 2.5 % (ref 0–6.9)
ERYTHROCYTE [DISTWIDTH] IN BLOOD BY AUTOMATED COUNT: 51.1 FL (ref 35.9–50)
ERYTHROCYTE [SEDIMENTATION RATE] IN BLOOD BY WESTERGREN METHOD: 17 MM/HOUR (ref 0–30)
GFR SERPL CREATININE-BSD FRML MDRD: >60 ML/MIN/1.73 M 2
GLOBULIN SER CALC-MCNC: 2.6 G/DL (ref 1.9–3.5)
GLUCOSE SERPL-MCNC: 89 MG/DL (ref 65–99)
HCT VFR BLD AUTO: 42.2 % (ref 37–47)
HGB BLD-MCNC: 14.3 G/DL (ref 12–16)
IMM GRANULOCYTES # BLD AUTO: 0.01 K/UL (ref 0–0.11)
IMM GRANULOCYTES NFR BLD AUTO: 0.2 % (ref 0–0.9)
LYMPHOCYTES # BLD AUTO: 1.58 K/UL (ref 1–4.8)
LYMPHOCYTES NFR BLD: 29.8 % (ref 22–41)
MCH RBC QN AUTO: 32.6 PG (ref 27–33)
MCHC RBC AUTO-ENTMCNC: 33.9 G/DL (ref 33.6–35)
MCV RBC AUTO: 96.1 FL (ref 81.4–97.8)
MONOCYTES # BLD AUTO: 0.45 K/UL (ref 0–0.85)
MONOCYTES NFR BLD AUTO: 8.5 % (ref 0–13.4)
NEUTROPHILS # BLD AUTO: 3.09 K/UL (ref 2–7.15)
NEUTROPHILS NFR BLD: 58.2 % (ref 44–72)
NRBC # BLD AUTO: 0 K/UL
NRBC BLD AUTO-RTO: 0 /100 WBC
PLATELET # BLD AUTO: 159 K/UL (ref 164–446)
PMV BLD AUTO: 11.7 FL (ref 9–12.9)
POTASSIUM SERPL-SCNC: 4.3 MMOL/L (ref 3.6–5.5)
PROT SERPL-MCNC: 6.4 G/DL (ref 6–8.2)
RBC # BLD AUTO: 4.39 M/UL (ref 4.2–5.4)
SODIUM SERPL-SCNC: 138 MMOL/L (ref 135–145)
WBC # BLD AUTO: 5.3 K/UL (ref 4.8–10.8)

## 2017-08-07 PROCEDURE — 36415 COLL VENOUS BLD VENIPUNCTURE: CPT

## 2017-08-07 PROCEDURE — 80053 COMPREHEN METABOLIC PANEL: CPT

## 2017-08-07 PROCEDURE — 85652 RBC SED RATE AUTOMATED: CPT

## 2017-08-07 PROCEDURE — 85025 COMPLETE CBC W/AUTO DIFF WBC: CPT

## 2017-08-07 NOTE — PROGRESS NOTES
Anticoagulation Summary as of 8/7/2017     INR goal 2.0-3.0   Selected INR 4.6! (8/5/2017)   Maintenance plan 2.5 mg (2.5 mg x 1) on Mon, Fri; 5 mg (2.5 mg x 2) all other days   Weekly total 30 mg   Plan last modified Toney Spicer, PHARMD (12/5/2016)   Next INR check 8/14/2017   Target end date     Indications   Long term current use of anticoagulants with INR goal of 2.0-3.0 [Z79.01]  Deep venous thrombosis  left [I82.402]  Atrial fibrillation [I48.91] [I48.91]         Anticoagulation Episode Summary     INR check location Home Draw    Preferred lab     Send INR reminders to     Comments       Anticoagulation Care Providers     Provider Role Specialty Phone number    CELIO Baeza. Referring Family Medicine [phone]    Rachel Alejandra, JUDYD Responsible          Anticoagulation Patient Findings    Spoke with pt on the phone. Pt is supratherapeutic today. Denies any changes in medications or diet. Patient denies any s/sx of bleeding or clotting. Pt held her warfarin over the weekend.  Instructed pt to restart her usual dosing as outlined in calendar. Will follow-up with pt in 1 week.    Hannah LESTER

## 2017-08-09 DIAGNOSIS — M85.89 OSTEOPENIA OF MULTIPLE SITES: ICD-10-CM

## 2017-08-09 DIAGNOSIS — M06.9 RHEUMATOID ARTHRITIS, INVOLVING UNSPECIFIED SITE, UNSPECIFIED RHEUMATOID FACTOR PRESENCE: ICD-10-CM

## 2017-08-09 RX ORDER — HYDROCODONE BITARTRATE AND ACETAMINOPHEN 5; 325 MG/1; MG/1
TABLET ORAL
Qty: 30 TAB | Refills: 0 | Status: CANCELLED | OUTPATIENT
Start: 2017-08-09

## 2017-08-09 RX ORDER — HYDROCODONE BITARTRATE AND ACETAMINOPHEN 5; 325 MG/1; MG/1
TABLET ORAL
Qty: 30 TAB | Refills: 0 | Status: SHIPPED | OUTPATIENT
Start: 2017-08-09 | End: 2017-08-31 | Stop reason: SDUPTHER

## 2017-08-09 NOTE — TELEPHONE ENCOUNTER
Was the patient seen in the last year in this department? Yes   Aug labs    Does patient have an active prescription for medications requested? No     Received Request Via: Patient     Pt will  when ready  Would like to  today.

## 2017-08-18 ENCOUNTER — ANTICOAGULATION MONITORING (OUTPATIENT)
Dept: VASCULAR LAB | Facility: MEDICAL CENTER | Age: 82
End: 2017-08-18

## 2017-08-18 DIAGNOSIS — Z79.01 LONG TERM CURRENT USE OF ANTICOAGULANTS WITH INR GOAL OF 2.0-3.0: ICD-10-CM

## 2017-08-18 DIAGNOSIS — I48.91 ATRIAL FIBRILLATION, UNSPECIFIED TYPE (HCC): ICD-10-CM

## 2017-08-18 LAB — INR PPP: 4 (ref 2–3.5)

## 2017-08-18 NOTE — PROGRESS NOTES
Anticoagulation Summary as of 8/18/2017     INR goal 2.0-3.0   Selected INR 4.0! (8/18/2017)   Maintenance plan 2.5 mg (2.5 mg x 1) on Mon, Wed, Fri; 5 mg (2.5 mg x 2) all other days   Weekly total 27.5 mg   Plan last modified Jasmyne Macdonald, PHARMD (8/18/2017)   Next INR check 9/1/2017   Target end date     Indications   Long term current use of anticoagulants with INR goal of 2.0-3.0 [Z79.01]  Deep venous thrombosis  left [I82.402]  Atrial fibrillation [I48.91] [I48.91]         Anticoagulation Episode Summary     INR check location Home Draw    Preferred lab     Send INR reminders to     Comments       Anticoagulation Care Providers     Provider Role Specialty Phone number    CELIO Baeza. Referring Family Medicine [phone]    Rachel Alejandra, JUDYD Responsible          Anticoagulation Patient Findings      Spoke with patient.  INR is SUPRA therapeutic.   Pt has started APAP, but no other changes.  Pt denies any unusual s/s of bleeding, bruising, clotting or any changes to diet or medications. Denies any etoh, cranberries, supplements, or illness.   Pt verifies warfarin weekly dosing.       Will have pt HOLD her warfarin today and then start an 8% reduced weekly dose.     Repeat INR in 2 weeks. Pt going on a cruise from 9/15-9/25, wants to test again on 9/13.    Jasmyne Macdonald, PHARMD

## 2017-08-28 DIAGNOSIS — M06.9 RHEUMATOID ARTHRITIS, INVOLVING UNSPECIFIED SITE, UNSPECIFIED RHEUMATOID FACTOR PRESENCE: ICD-10-CM

## 2017-08-28 DIAGNOSIS — Z79.01 LONG TERM CURRENT USE OF ANTICOAGULANTS WITH INR GOAL OF 2.0-3.0: ICD-10-CM

## 2017-08-28 NOTE — TELEPHONE ENCOUNTER
Was the patient seen in the last year in this department? Yes   Aug labs    Does patient have an active prescription for medications requested? No     Received Request Via: Patient

## 2017-08-29 ENCOUNTER — HOSPITAL ENCOUNTER (OUTPATIENT)
Dept: RADIOLOGY | Facility: MEDICAL CENTER | Age: 82
End: 2017-08-29
Attending: NURSE PRACTITIONER
Payer: MEDICARE

## 2017-08-29 ENCOUNTER — OFFICE VISIT (OUTPATIENT)
Dept: MEDICAL GROUP | Facility: PHYSICIAN GROUP | Age: 82
End: 2017-08-29
Payer: MEDICARE

## 2017-08-29 ENCOUNTER — HOSPITAL ENCOUNTER (OUTPATIENT)
Dept: LAB | Facility: MEDICAL CENTER | Age: 82
End: 2017-08-29
Attending: NURSE PRACTITIONER
Payer: MEDICARE

## 2017-08-29 VITALS
HEIGHT: 63 IN | TEMPERATURE: 97.8 F | RESPIRATION RATE: 18 BRPM | OXYGEN SATURATION: 96 % | HEART RATE: 68 BPM | DIASTOLIC BLOOD PRESSURE: 80 MMHG | BODY MASS INDEX: 28.7 KG/M2 | SYSTOLIC BLOOD PRESSURE: 122 MMHG | WEIGHT: 162 LBS

## 2017-08-29 DIAGNOSIS — R19.4 CHANGE IN BOWEL HABITS: ICD-10-CM

## 2017-08-29 DIAGNOSIS — K92.1 MELENA: ICD-10-CM

## 2017-08-29 DIAGNOSIS — R19.4 CHANGE IN BOWEL HABITS: Primary | ICD-10-CM

## 2017-08-29 LAB
ERYTHROCYTE [DISTWIDTH] IN BLOOD BY AUTOMATED COUNT: 49 FL (ref 35.9–50)
HCT VFR BLD AUTO: 41.1 % (ref 37–47)
HGB BLD-MCNC: 13.7 G/DL (ref 12–16)
MCH RBC QN AUTO: 32.4 PG (ref 27–33)
MCHC RBC AUTO-ENTMCNC: 33.3 G/DL (ref 33.6–35)
MCV RBC AUTO: 97.2 FL (ref 81.4–97.8)
PLATELET # BLD AUTO: 172 K/UL (ref 164–446)
PMV BLD AUTO: 11.9 FL (ref 9–12.9)
RBC # BLD AUTO: 4.23 M/UL (ref 4.2–5.4)
WBC # BLD AUTO: 5.6 K/UL (ref 4.8–10.8)

## 2017-08-29 PROCEDURE — 74020 DX-ABDOMEN-2 VIEWS: CPT

## 2017-08-29 PROCEDURE — 99214 OFFICE O/P EST MOD 30 MIN: CPT | Performed by: NURSE PRACTITIONER

## 2017-08-29 ASSESSMENT — ENCOUNTER SYMPTOMS: ROS GI COMMENTS: SEE HPI

## 2017-08-29 NOTE — PROGRESS NOTES
Subjective:      Marry Mathur is a 82 y.o. female who presents with Stool Color Change (black stool )            HPI  Marry is here today to discuss the following new problem:    1. Change in bowel habits  2. Melena  Patient states that over the past several weeks she has been experiencing fluctuations in her bowel movements.  She states that she will have several days of very firm, hard stools that fluctuate with the loose stools.  She states that there have been several occasions that she has leaked stool in her undergarments.  She had a bowel movement last night and noticed that there was dark black stool when wiping.  She also reports generalized abdominal discomfort.  Denies any nausea, vomiting or change in her appetite.  She had a CT of the pelvis performed in June, without any mention of abnormalities with the exception of increased colonic stool.  She is under the care of Dr. Sherman, and is up-to-date with colon cancer screening although we do not have the reports for review.  She has tried over-the-counter remedies to help with constipation and diarrhea without any significant resolution or help.    Active Ambulatory Problems     Diagnosis Date Noted   • Nonspecific abnormal electrocardiogram (ECG) (EKG) 06/27/2012   • Right bundle branch block 06/27/2012   • Essential hypertension, benign 06/27/2012   • Other and unspecified hyperlipidemia 06/27/2012   • Hyperglycemia 06/27/2012   • GERD (gastroesophageal reflux disease) 06/27/2012   • Back pain 06/27/2012   • Long term current use of anticoagulants with INR goal of 2.0-3.0 03/08/2016   • Deep venous thrombosis, left 03/08/2016   • Atrial fibrillation [I48.91] 04/19/2016   • Hernia, inguinal, right 09/23/2016   • Rheumatoid nodule (CMS-HCC) 07/25/2017     Resolved Ambulatory Problems     Diagnosis Date Noted   • Deep venous thrombosis (CMS-HCC) 06/27/2012   • Rheumatoid arthritis (CMS-HCC) 03/14/2016     Past Medical History:   Diagnosis Date   •  "Blood clotting disorder (CMS-[facility]) 2012   • Cancer (CMS-HCC)    • Chronic back pain greater than 3 months duration    • Hiatus hernia syndrome    • Hyperlipidemia    • Hypertension    • Prediabetes    • Rheumatoid arthritis with rheumatoid factor (CMS-[facility])      Review of Systems   Gastrointestinal:        See HPI          Objective:     /80   Pulse 68   Temp 36.6 °C (97.8 °F)   Resp 18   Ht 1.6 m (5' 3\")   Wt 73.5 kg (162 lb)   SpO2 96%   BMI 28.70 kg/m²      Physical Exam   Constitutional: She appears well-developed and well-nourished.   Cardiovascular: Normal rate.    Pulmonary/Chest: Effort normal.   Abdominal: Soft. Bowel sounds are normal. She exhibits no distension and no mass. There is no tenderness. There is no rebound and no guarding. No hernia.   Nursing note and vitals reviewed.              Assessment/Plan:     1. Change in bowel habits  CBC WITHOUT DIFFERENTIAL    REFERRAL TO GASTROENTEROLOGY    [id number] VIEWS    COMPLETE O&P    CULTURE STOOL    OCCULT BLOOD STOOL    GRAM STAIN    CBC WITH DIFFERENTIAL    GIARDIA ANTIGEN   2. Melena  CBC WITHOUT DIFFERENTIAL    REFERRAL TO GASTROENTEROLOGY    [id number] VIEWS    OCCULT BLOOD STOOL       1.  Labs today  2.  Abdominal xray today  3.  Referral to GI, urgent.  Patient is scheduled to leave on cruise on 9/16/17  4.  Consider stool sample if symptoms change or worsen, or if the above failed to provide any treatment direction.      "

## 2017-08-29 NOTE — LETTER
[facility]  KAREN BaezaP.VICTOR HUGO.  [address]  [place] [address]  Fax: [phone]   Authorization for Release/Disclosure of   Protected Health Information   Name: MARRY MATHUR : 1935 SSN: [id number]   Address: [address] [place] [address] [address]  [place] [address] Phone:    [phone] (home)    I authorize the entity listed below to release/disclose the PHI below to:   [facility]/Megan Howard A.P.JODEE and ELBA Baeza   Provider or Entity Name:  JACEY Vazquez    [facility]   Phone:      Fax:  [phone]   Reason for request: continuity of care   Information to be released:    [X] LAST COLONOSCOPY,  including any PATH REPORT and follow-up  [  ] LAST FIT/COLOGUARD RESULT [  ] LAST DEXA  [  ] LAST MAMMOGRAM  [  ] LAST PAP  [  ] LAST LABS [  ] RETINA EXAM REPORT  [  ] IMMUNIZATION RECORDS  [  ] Release all info      [  ] Check here and initial the line next to each item to release ALL health information INCLUDING  _____ Care and treatment for drug and / or alcohol abuse  _____ HIV testing, infection status, or AIDS  _____ Genetic Testing    DATES OF SERVICE OR TIME PERIOD TO BE DISCLOSED: _____________  I understand and acknowledge that:  * This Authorization may be revoked at any time by you in writing, except if your health information has already been used or disclosed.  * Your health information that will be used or disclosed as a result of you signing this authorization could be re-disclosed by the recipient. If this occurs, your re-disclosed health information may no longer be protected by State or Federal laws.  * You may refuse to sign this Authorization. Your refusal will not affect your ability to obtain treatment.  * This Authorization becomes effective upon signing and will  on (date) __________.      If no date is indicated, this Authorization will  one (1) year from the signature date.    Name: Marry Mathur    Electronic  signature: Marry Mathur     Date:     8/29/2017     PLEASE FAX REQUESTED RECORDS BACK TO: [phone]

## 2017-08-31 ENCOUNTER — OFFICE VISIT (OUTPATIENT)
Dept: RHEUMATOLOGY | Facility: PHYSICIAN GROUP | Age: 82
End: 2017-08-31
Payer: MEDICARE

## 2017-08-31 VITALS
SYSTOLIC BLOOD PRESSURE: 120 MMHG | TEMPERATURE: 97.3 F | HEART RATE: 62 BPM | BODY MASS INDEX: 28.52 KG/M2 | DIASTOLIC BLOOD PRESSURE: 72 MMHG | WEIGHT: 161 LBS | RESPIRATION RATE: 12 BRPM | OXYGEN SATURATION: 96 %

## 2017-08-31 DIAGNOSIS — Z79.01 LONG TERM CURRENT USE OF ANTICOAGULANTS WITH INR GOAL OF 2.0-3.0: ICD-10-CM

## 2017-08-31 DIAGNOSIS — I10 ESSENTIAL HYPERTENSION, BENIGN: ICD-10-CM

## 2017-08-31 DIAGNOSIS — M85.89 OSTEOPENIA OF MULTIPLE SITES: ICD-10-CM

## 2017-08-31 DIAGNOSIS — M06.9 RHEUMATOID ARTHRITIS, INVOLVING UNSPECIFIED SITE, UNSPECIFIED RHEUMATOID FACTOR PRESENCE: ICD-10-CM

## 2017-08-31 DIAGNOSIS — I48.91 ATRIAL FIBRILLATION, UNSPECIFIED TYPE (HCC): ICD-10-CM

## 2017-08-31 PROCEDURE — 99214 OFFICE O/P EST MOD 30 MIN: CPT | Performed by: INTERNAL MEDICINE

## 2017-08-31 RX ORDER — HYDROCODONE BITARTRATE AND ACETAMINOPHEN 5; 325 MG/1; MG/1
TABLET ORAL
Qty: 30 TAB | Refills: 0 | Status: SHIPPED | OUTPATIENT
Start: 2017-09-08 | End: 2017-10-19 | Stop reason: SDUPTHER

## 2017-08-31 NOTE — LETTER
[facility]   [address], [address]  [place] [address]  Phone: [phone]  Fax: [phone]              Encounter Date: 8/31/2017    Dear Dr. Eaton ref. provider found,    It was a pleasure seeing your patient, Marry Mathur, on 8/31/2017. Diagnoses of Rheumatoid arthritis, involving unspecified site, unspecified rheumatoid factor presence (CMS-HCC), Osteopenia of multiple sites, Essential hypertension, benign, Atrial fibrillation, unspecified type (CMS-HCC), and Long term current use of anticoagulants with INR goal of 2.0-3.0 were pertinent to this visit.     Please find attached progress note which includes the history I obtained from Ms. Mathur, my physical examination findings, my impression and recommendations.      Once again, it was a pleasure participating in your patient's care.  Please feel free to contact me if you have any questions or if I can be of any further assistance to your patients.      Sincerely,    Deanna Escoto M.D.  Electronically Signed          PROGRESS NOTE:  No notes on file

## 2017-08-31 NOTE — PROGRESS NOTES
Chief Complaint- joint pain    Subjective:   Marry Mathur is a 82 y.o. female here today for follow up of rheumatological issues     Pt here with a dx of RA at Dr Peguero office currently on  MTX 15 mg po qweek and Humira injections 40 mg subcutaneous every 2 weeks.  No psoriaisis, positive skin cancer, positive melanoma 2001, no iritis/uveitis, no FUO, no weight loss, no recurrent infections, no colitis, no recurrent infections, no new neurological disorders, no fevers of unknown etiology. In the recent past, patient status post cortisone injections in bilateral sacroiliac joints and she feels really quite good and able to ambulate. Patient is quite traveler and has a lot of travel plans coming up, keeps herself quite busy. Pt also diabetic, takes metformin, also with a diagnosis of spinal stenosis with intermittent weakness in her legs with progressive numbness and weakness in the left leg. Patient denies any bowel or bladder incontinence. Denies any new rashes, denies any unexplained weight loss, denies any new skin cancers, is followed very closely by her dermatologist regarding skin cancers.        S/p Remicade-stopped because of hx of melanoma about 1996 and has multiple other skin cancers  S/p Orencia-lost efficacy        Hep B neg 6/2016  Hand X-rays 3/2016  Indicates DJD  Feet x-rays 3/2016  Indicates DJD  DEXA 3/2016 T scores 1.1, -1.1   Echocardiogram 7/2012 [facility]  RF neg 3/2014 [facility]; RF neg 6/2014 [facility]; RF neg 6/2016  CCP neg 3/2014 [facility]; CCP neg 6/2014 [facility]; CCP neg 6/2016  Uric acid 4.7 3/2014 [facility];Uric acid 4.5 6/2016  Quantiferon Gold neg 6/2014 [facility]; Quantiferon Gold neg 6/2016  Hand x-rays 3/2016-indicate osteoarthritis  Feet x-rays-indicate osteoarthritis     Current medicines (including changes today)  Current Outpatient Prescriptions   Medication Sig Dispense Refill   • [START ON 9/8/2017] hydrocodone-acetaminophen (NORCO) 5-325 MG Tab per  tablet 1 tab po bid for severe joint pain, NO ALCOHOL , NO DRIVING and NO MARIJUANA  within 24 hrs of taking this medication, NO BENZODIAZEPINE medications, no selling or giving to any other persons 30 Tab 0   • Psyllium (METAMUCIL FIBER PO) Take  by mouth.     • Wheat Dextrin (BENEFIBER DRINK MIX PO) Take  by mouth.     • methotrexate 2.5 MG Tab 6 tabs po q wednesday 72 Tab 0   • Adalimumab 40 MG/0.8ML Prefilled Syringe Kit Inject 0.8 mL as instructed every 14 days. 6 Each 1   • valsartan-hydrochlorothiazide (DIOVAN HCT) 80-12.5 MG per tablet Take 0.5 Tabs by mouth every day. 45 Tab 1   • atorvastatin (LIPITOR) 20 MG Tab Take 1 Tab by mouth every day. 90 Tab 1   • tizanidine (ZANAFLEX) 2 MG capsule 1-2 tabs po qhs for muscle relaxer 60 Cap 2   • omeprazole (PRILOSEC) 20 MG delayed-release capsule Take 1 Cap by mouth every day. 90 Cap 3   • metformin (GLUCOPHAGE) 500 MG Tab Take 1 Tab by mouth every day. 90 Tab 3   • warfarin (COUMADIN) 2.5 MG Tab Take 1 to 2 tablets by mouth daily as directed by the coumadin clinic 180 Tab 1   • pregabalin (LYRICA) 75 MG Cap Take 1 Cap by mouth 2 times a day. 180 Cap 1   • acetaminophen (TYLENOL) 325 MG Tab Take 650 mg by mouth at bedtime as needed.     • folic acid (FOLVITE) 400 MCG tablet Take 400 mcg by mouth every day.     • vitamin D (CHOLECALCIFEROL) 1000 UNIT Tab Take 1,000 Units by mouth every day.     • POTASSIUM GLUCONATE Take 1 Tab by mouth every day.     • magnesium oxide (MAG-OX) 400 MG TABS Take 400 mg by mouth 2 times a day.     • Calcium Carbonate-Vitamin D (CALCIUM + D PO) Take 1 Tab by mouth every day.     • lidocaine (LIDODERM) 5 % Patch Apply 1 Patch to skin as directed every 24 hours. 90 Patch 1     No current facility-administered medications for this visit.      She  has a past medical history of Blood clotting disorder (CMS-HCC) (2012); Cancer (CMS-HCC); Chronic back pain greater than 3 months duration; Hiatus hernia syndrome; Hyperlipidemia; Hypertension;  Prediabetes; and Rheumatoid arthritis with rheumatoid factor (CMS-HCC).    ROS   Other than what is mentioned in HPI or physical exam, there is no history of headaches, double vision or blurred vision. No temporal tenderness or jaw claudication. No history of cataracts or glaucoma. No trouble swallowing difficulties or sore throats. No history of thyroid disease. No chest complaints including chest pain, cough or sputum production. No shortness of breath. No GI complaints including nausea, vomiting, change in bowel habits, or past peptic ulcer disease. No history of blood in the stools. No urinary complaints including dysuria or frequency. No history of rash including psoriasis. No history of alopecia, photosensitivity, oral ulcerations, Raynaud's phenomena, or swollen joints. No history of gout. No back complaints. No history of low blood counts.       Objective:     Blood pressure 120/72, pulse 62, temperature 36.3 °C (97.3 °F), resp. rate 12, weight 73 kg (161 lb), SpO2 96 %. Body mass index is 28.52 kg/m².   Physical Exam:  Constitutional: Alert and oriented X3,.Skin: Warm, dry, good turgor, no rashes in visible areas, multiple areas of resection of skin cancersEye: Equal, round and reactive, conjunctiva clear, lids normal EOM intact , mild arcus senilis bilaterallyENMT: Lips without lesions, good dentition, no oropharyngeal ulcers, moist buccal mucosa, pinna without deformityNeck: Trachea midline, no masses, no thyromegaly.Lymph:  No cervical lymphadenopathy, no axillary lymphadenopathy, no supraclavicular lymphadenopathyRespiratory: Unlabored respiratory effort, lungs clear to auscultation, no wheezes, no ronchi.Cardiovascular: Normal S1, S2, patient has a 3/6 systolic ejection murmur heard both at the right and left sternal borders, no edema.Abdomen: Soft, non-tender, no masses, no hepatosplenomegaly.Psych: Alert and oriented x3, normal affect and mood.Neuro: Cranial nerves 2-12 are grossly intact, no loss of  sensation LEExt:no joint laxity noted in bilateral arms, no joint laxity noted in bilateral legs, gait without antalgia and without foot drop, there is evidence of Heberden's nodes especially in the index DIP joint, no christin swan-neck or boutonniere deformities, no ulnar deviation, no flexure contractures in the elbows, knees with crepitus bilaterally but no effusions, no Achilles tendon inflammation.    Lab Results   Component Value Date/Time    QNTTBGOLD Negative 06/29/2016 02:03 PM     Lab Results   Component Value Date/Time    HEPBCORIGM Negative 06/29/2016 02:03 PM    HEPBSAG Negative 06/29/2016 02:03 PM     Lab Results   Component Value Date/Time    SODIUM 138 08/07/2017 08:45 AM    POTASSIUM 4.3 08/07/2017 08:45 AM    CHLORIDE 103 08/07/2017 08:45 AM    CO2 30 08/07/2017 08:45 AM    GLUCOSE 89 08/07/2017 08:45 AM    BUN 20 08/07/2017 08:45 AM    CREATININE 0.74 08/07/2017 08:45 AM      Lab Results   Component Value Date/Time    WBC 5.6 08/29/2017 03:20 PM    RBC 4.23 08/29/2017 03:20 PM    HEMOGLOBIN 13.7 08/29/2017 03:20 PM    HEMATOCRIT 41.1 08/29/2017 03:20 PM    MCV 97.2 08/29/2017 03:20 PM    MCH 32.4 08/29/2017 03:20 PM    MCHC 33.3 (L) 08/29/2017 03:20 PM    MPV 11.9 08/29/2017 03:20 PM    NEUTSPOLYS 58.20 08/07/2017 08:45 AM    LYMPHOCYTES 29.80 08/07/2017 08:45 AM    MONOCYTES 8.50 08/07/2017 08:45 AM    EOSINOPHILS 2.50 08/07/2017 08:45 AM    BASOPHILS 0.80 08/07/2017 08:45 AM      Lab Results   Component Value Date/Time    CALCIUM 8.9 08/07/2017 08:45 AM    ASTSGOT 25 08/07/2017 08:45 AM    ALTSGPT 30 08/07/2017 08:45 AM    ALKPHOSPHAT 46 08/07/2017 08:45 AM    TBILIRUBIN 1.8 (H) 08/07/2017 08:45 AM    ALBUMIN 3.8 08/07/2017 08:45 AM    TOTPROTEIN 6.4 08/07/2017 08:45 AM     Lab Results   Component Value Date/Time    URICACID 4.5 06/29/2016 02:03 PM    RHEUMFACTN <10 06/29/2016 02:03 PM    CCPANTIBODY 4 06/29/2016 02:03 PM     Lab Results   Component Value Date/Time    SEDRATEWES 17 08/07/2017  08:45 AM     Lab Results   Component Value Date/Time    HBA1C 5.5 05/16/2016 07:50 AM     Lab Results   Component Value Date/Time    CPKTOTAL 61 06/29/2016 02:03 PM     Results for orders placed during the hospital encounter of 03/18/16   DX-JOINT SURVEY-HANDS SINGLE VIEW    Impression Multiple bilateral sites of osteoarthritis     Results for orders placed during the hospital encounter of 03/18/16   DX-JOINT SURVEY-FEET SINGLE VIEW    Impression 1.  Bilateral midfoot and forefoot osteoarthritis    2.  Bilateral bunion    3.  Prior fusion across the right 2nd proximal interphalangeal joint    4.  Foreign body or opaque material between 1st and 2nd phalanges     Results for orders placed during the hospital encounter of 03/18/16   DS-BONE DENSITY STUDY (DEXA)    Impression According to the World Health Organization classification, bone mineral density of this patient is osteopenia with increased risk of fracture.        10-year Probability of Fracture:  Major Osteoporotic     14.3%  Hip     3.7%  Population      [place] ()    Based on left femur neck BMD          INTERPRETING LOCATION:  [address] [place] [address], [address]     Results for orders placed during the hospital encounter of 01/14/09   DX-KNEE COMPLETE 4+    Impression IMPRESSION:     MILD PATELLOFEMORAL AND MEDIAL FEMOROTIBIAL COMPARTMENT DEGENERATIVE   OSTEOARTHROSIS.        GEK:leonie     Read By ALEX WISE MD on Jan 14 2009 10:01AM  : DANA Transcription Date: Jovi 15 2009  8:11AM  THIS DOCUMENT HAS BEEN ELECTRONICALLY SIGNED BY: ALEX WISE MD on   Jan 16 2009  1:32PM        Results for orders placed during the hospital encounter of 01/14/09   DX-SHOULDER 2+    Impression IMPRESSION:     NORMAL RADIOGRAPHS OF THE LEFT SHOULDER WITH NOTE MADE OF MINIMAL   DEGENERATIVE OSTEOARTHROSIS OF THE GLENOHUMERAL JOINT WITH MINIMAL   SPURRING.           Results for orders placed during the hospital encounter of 05/12/17   MR-LUMBAR  SPINE-W/O    Impression 1.  Mild spinal stenosis at L3-4    2.  Multilevel foraminal stenoses describing the findings section    3.  Degenerative subluxations at T12-L1, L1-2, and L4-5    4.  Mild edematous endplate change adjacent to T12-L1    5.  Incidental hemangioma within the left side of L4    6.  8 mm focus of marrow placement within L2. Differential diagnosis includes atypical hemangioma or a metastasis. Significance of this finding depends almost exclusively on whether there is a history of known malignancy.     Results for orders placed during the hospital encounter of 01/14/09   DX-CERVICAL SPINE-2 OR 3 VIEWS    Impression IMPRESSION:     DEGENERATIVE DISC AND FACET ARTHROPATHY C5-6, C6-7, AND DEGENERATIVE   FACET ARTHROPATHY AT C7-T1.           Assessment and Plan:     1. Rheumatoid arthritis, involving unspecified site, unspecified rheumatoid factor presence (CMS-HCC)  Continue methotrexate at 15 mg by mouth every week and Humira 40 mg subcutaneous every 2 weeks of note sedimentation rate is 17 labs to be done every 3 months, labs ordered for patient, next labs do about November 2017  - hydrocodone-acetaminophen (NORCO) 5-325 MG Tab per tablet; 1 tab po bid for severe joint pain, NO ALCOHOL , NO DRIVING and NO MARIJUANA  within 24 hrs of taking this medication, NO BENZODIAZEPINE medications, no selling or giving to any other persons  Dispense: 30 Tab; Refill: 0  - CBC WITH DIFFERENTIAL; Future  - COMP METABOLIC PANEL; Future  - WESTERGREN SED RATE; Future    2. Osteopenia of multiple sites  Last DEXA March 2016, next DEXA March 2018   continue calcium 1200 mg by mouth daily and vitamin D about 1000 units by mouth daily and magnesium 400 mg by mouth daily  - hydrocodone-acetaminophen (NORCO) 5-325 MG Tab per tablet; 1 tab po bid for severe joint pain, NO ALCOHOL , NO DRIVING and NO MARIJUANA  within 24 hrs of taking this medication, NO BENZODIAZEPINE medications, no selling or giving to any other persons   Dispense: 30 Tab; Refill: 0    3. Essential hypertension, benign  May impact the type of medications we can use for this patient's arthritis. We will have to keep this under advisement.    4. Atrial fibrillation, unspecified type (CMS-HCC)  On chronic anticoagulation    5. Long term current use of anticoagulants with INR goal of 2.0-3.0  This will impact with kind of medications we can use for this patient's arthritis, NSAIDs are contraindicated because of increased risk of bleeding while on chronic anticoagulation    Followup: Return in about 3 months (around 11/30/2017). or sooner prn    Patient was seen 30 minutes face-to-face of which more than 50% of the time was spent counseling the patient (excluding time for procedures)  regarding  rheumatological condition and care. Therapy was discussed in detail.    Please note that this dictation was created using voice recognition software. I have made every reasonable attempt to correct obvious errors, but I expect that there are errors of grammar and possibly content that I did not discover before finalizing the note.

## 2017-09-01 ENCOUNTER — HOSPITAL ENCOUNTER (OUTPATIENT)
Facility: MEDICAL CENTER | Age: 82
End: 2017-09-01
Attending: NURSE PRACTITIONER
Payer: MEDICARE

## 2017-09-01 PROCEDURE — 87899 AGENT NOS ASSAY W/OPTIC: CPT

## 2017-09-01 PROCEDURE — 87328 CRYPTOSPORIDIUM AG IA: CPT

## 2017-09-01 PROCEDURE — 87329 GIARDIA AG IA: CPT

## 2017-09-01 PROCEDURE — 87045 FECES CULTURE AEROBIC BACT: CPT

## 2017-09-01 PROCEDURE — 87046 STOOL CULTR AEROBIC BACT EA: CPT

## 2017-09-02 DIAGNOSIS — R19.4 CHANGE IN BOWEL HABITS: ICD-10-CM

## 2017-09-02 LAB
G LAMBLIA+C PARVUM AG STL QL RAPID: NORMAL
SIGNIFICANT IND 70042: NORMAL
SOURCE SOURCE: NORMAL

## 2017-09-03 LAB
E COLI SXT1+2 STL IA: NORMAL
SIGNIFICANT IND 70042: NORMAL
SOURCE SOURCE: NORMAL

## 2017-09-05 ENCOUNTER — ANTICOAGULATION MONITORING (OUTPATIENT)
Dept: VASCULAR LAB | Facility: MEDICAL CENTER | Age: 82
End: 2017-09-05

## 2017-09-05 DIAGNOSIS — Z79.01 LONG TERM CURRENT USE OF ANTICOAGULANTS WITH INR GOAL OF 2.0-3.0: ICD-10-CM

## 2017-09-05 DIAGNOSIS — I48.91 ATRIAL FIBRILLATION, UNSPECIFIED TYPE (HCC): ICD-10-CM

## 2017-09-05 LAB
BACTERIA STL CULT: NORMAL
E COLI SXT1+2 STL IA: NORMAL
INR PPP: 2.6 (ref 2–3.5)
SIGNIFICANT IND 70042: NORMAL
SOURCE SOURCE: NORMAL

## 2017-09-05 NOTE — PROGRESS NOTES
Spoke with pt on the phone. She reports she stopped her warfarin for a spine injection.  9-8-17. She was told to stop her warfarin 5 days prior so she did. She is calling today for instructions to restart and to check. Told pt to ask provider when it is ok to restart. She is to take 5 mg daily when she restarts and recheck 5 days after procedure.     Kemi Lopez, Pharm D

## 2017-09-05 NOTE — PROGRESS NOTES
OP Anticoagulation Telephone Note    Date: 9/5/2017  Anticoagulation Summary  As of 9/5/2017    INR goal:   2.0-3.0   TTR:   52.7 % (1.5 y)   Today's INR:   2.6 (9/2/2017)   Maintenance plan:   2.5 mg (2.5 mg x 1) on Mon, Wed, Fri; 5 mg (2.5 mg x 2) all other days   Weekly total:   27.5 mg   No change documented:   Kristan Krishnan, Med Ass't   Plan last modified:   Perlita LloydD (8/18/2017)   Next INR check:   9/18/2017   Target end date:       Indications    Long term current use of anticoagulants with INR goal of 2.0-3.0 [Z79.01]  Deep venous thrombosis  left [I82.402]  Atrial fibrillation [I48.91] [I48.91]             Anticoagulation Episode Summary     INR check location:   Home Draw    Preferred lab:       Send INR reminders to:       Comments:         Anticoagulation Care Providers     Provider Role Specialty Phone number    Megandinah Howard A.P.N. Referring Family Medicine [phone]    Jasmyne Macdonald, PharmD Responsible          Anticoagulation Patient Findings  Patient Findings     Negatives:   Signs/symptoms of thrombosis, Signs/symptoms of bleeding, Laboratory test error suspected, Change in health, Change in alcohol use, Change in activity, Upcoming invasive procedure, Emergency department visit, Upcoming dental procedure, Missed doses, Extra doses, Change in medications, Change in diet/appetite, Hospital admission, Bruising, Other complaints      Plan:  Left message on patient's answering machine/ voicemail. Instructed patient to call back with any concerns regarding any unusual bleeding or bruising, any medication or diet changes, or any signs or symptoms of thrombosis. Instructed patient to resume medication as outlined above. Patient to follow up in 2 weeks.     Kristan Krishnan, Medical Assistant    I have reviewed and agree with the plan above on 09/14/2017      Rachel Alejandra, PerlitaD

## 2017-09-07 ENCOUNTER — TELEPHONE (OUTPATIENT)
Dept: MEDICAL GROUP | Facility: PHYSICIAN GROUP | Age: 82
End: 2017-09-07

## 2017-09-07 DIAGNOSIS — A07.2 CRYPTOSPORIDIAL GASTROENTERITIS (HCC): ICD-10-CM

## 2017-09-07 RX ORDER — NITAZOXANIDE 500 MG/1
500 TABLET ORAL 2 TIMES DAILY
Qty: 6 TAB | Refills: 0 | Status: SHIPPED | OUTPATIENT
Start: 2017-09-07 | End: 2017-09-08 | Stop reason: SDUPTHER

## 2017-09-07 NOTE — TELEPHONE ENCOUNTER
1. [facility]                                          Call Back Number: [phone]      Patient approves a detailed voicemail message: N\A    States pt was positive for crypto/giardia and didn't see where she was being treated for it.  He states red book recommends nitazoxanide

## 2017-09-08 ENCOUNTER — HOSPITAL ENCOUNTER (OUTPATIENT)
Dept: PAIN MANAGEMENT | Facility: REHABILITATION | Age: 82
End: 2017-09-08
Attending: PHYSICAL MEDICINE & REHABILITATION
Payer: MEDICARE

## 2017-09-08 ENCOUNTER — HOSPITAL ENCOUNTER (OUTPATIENT)
Dept: RADIOLOGY | Facility: REHABILITATION | Age: 82
End: 2017-09-08
Attending: PHYSICAL MEDICINE & REHABILITATION
Payer: MEDICARE

## 2017-09-08 VITALS
TEMPERATURE: 97.4 F | HEIGHT: 63 IN | SYSTOLIC BLOOD PRESSURE: 146 MMHG | DIASTOLIC BLOOD PRESSURE: 79 MMHG | RESPIRATION RATE: 16 BRPM | WEIGHT: 160.5 LBS | HEART RATE: 65 BPM | BODY MASS INDEX: 28.44 KG/M2 | OXYGEN SATURATION: 96 %

## 2017-09-08 DIAGNOSIS — A07.2 CRYPTOSPORIDIAL GASTROENTERITIS (HCC): ICD-10-CM

## 2017-09-08 PROCEDURE — 62323 NJX INTERLAMINAR LMBR/SAC: CPT

## 2017-09-08 PROCEDURE — 700111 HCHG RX REV CODE 636 W/ 250 OVERRIDE (IP)

## 2017-09-08 PROCEDURE — 700101 HCHG RX REV CODE 250

## 2017-09-08 PROCEDURE — 700117 HCHG RX CONTRAST REV CODE 255

## 2017-09-08 RX ORDER — METHYLPREDNISOLONE ACETATE 40 MG/ML
INJECTION, SUSPENSION INTRA-ARTICULAR; INTRALESIONAL; INTRAMUSCULAR; SOFT TISSUE
Status: COMPLETED
Start: 2017-09-08 | End: 2017-09-08

## 2017-09-08 RX ORDER — LIDOCAINE HYDROCHLORIDE 20 MG/ML
INJECTION, SOLUTION EPIDURAL; INFILTRATION; INTRACAUDAL; PERINEURAL
Status: COMPLETED
Start: 2017-09-08 | End: 2017-09-08

## 2017-09-08 RX ORDER — METHYLPREDNISOLONE ACETATE 80 MG/ML
INJECTION, SUSPENSION INTRA-ARTICULAR; INTRALESIONAL; INTRAMUSCULAR; SOFT TISSUE
Status: COMPLETED
Start: 2017-09-08 | End: 2017-09-08

## 2017-09-08 RX ORDER — NITAZOXANIDE 500 MG/1
500 TABLET ORAL 2 TIMES DAILY
Qty: 6 TAB | Refills: 0 | Status: SHIPPED | OUTPATIENT
Start: 2017-09-08 | End: 2017-09-11

## 2017-09-08 RX ADMIN — IOHEXOL 1 ML: 240 INJECTION, SOLUTION INTRATHECAL; INTRAVASCULAR; INTRAVENOUS; ORAL at 08:30

## 2017-09-08 RX ADMIN — LIDOCAINE HYDROCHLORIDE 0.5 ML: 20 INJECTION, SOLUTION EPIDURAL; INFILTRATION; INTRACAUDAL; PERINEURAL at 08:37

## 2017-09-08 RX ADMIN — METHYLPREDNISOLONE ACETATE 40 MG: 40 INJECTION, SUSPENSION INTRA-ARTICULAR; INTRALESIONAL; INTRAMUSCULAR; SOFT TISSUE at 08:37

## 2017-09-08 RX ADMIN — METHYLPREDNISOLONE ACETATE 80 MG: 80 INJECTION, SUSPENSION INTRA-ARTICULAR; INTRALESIONAL; INTRAMUSCULAR; SOFT TISSUE at 08:37

## 2017-09-08 RX ADMIN — LIDOCAINE HYDROCHLORIDE 2 ML: 20 INJECTION, SOLUTION EPIDURAL; INFILTRATION; INTRACAUDAL; PERINEURAL at 08:29

## 2017-09-08 ASSESSMENT — PAIN SCALES - GENERAL
PAINLEVEL_OUTOF10: 3
PAINLEVEL_OUTOF10: 0

## 2017-09-08 NOTE — PROGRESS NOTES
Patient positioned by RN  CNA   . Ankle supported on pillow. Arms supported by stool at head of the bed.

## 2017-09-08 NOTE — TELEPHONE ENCOUNTER
PT CALLED MAIL AND CANCELLED.  NEEDS TO GO TO A LOCAL PHARMACY.  PT WOULD LIKE TO START BEFORE THE WEEK END

## 2017-09-08 NOTE — PROGRESS NOTES
Current medications reviewed with pt, see medications reconciliation form. Pt yaquelin taking ASA or other blood thinners or anti-inflammatories.  Pt has a ride post-procedure(friend to drive).  Printed and verbal discharge instructions given to pt who verbalized understanding.Pt was [facility] of an [facility], pt hasn't started. Notified Dr. Cooper.

## 2017-09-08 NOTE — TELEPHONE ENCOUNTER
Patient requests change in pharmacy.    [id number] - [place], [place] - [address] [address]  Phone: [phone] Fax: [phone]

## 2017-09-08 NOTE — PROGRESS NOTES
Patient came from procedure room with numb left foot. Patient remained in department until she could walk unassisted.

## 2017-09-09 NOTE — PROCEDURES
DATE OF SERVICE:  09/08/2017    NAME OF PROCEDURE:  1.  Left L5-S1 interlaminar epidural steroid injection aborted secondary to   inability to get any resistance in order to attain a loss of resistance,   possibly because of a defect in the lamina.  2.  Left L4-5 interlaminar epidural steroid injection with 120 mg of   Depo-Medrol under fluoroscopic guidance.    INDICATION:  The patient is a patient of mine with low back pain with referral   into the left lower extremity, felt to be due to her known degenerative   changes and stenosis causing radiculitis.  For this reason, an epidural   steroid injection was chosen for today's procedure.  She was authorized to   hold her Coumadin and her INR was done yesterday on her own machine.  She   brought it in to show us and this was 1.1 and therefore, safe to proceed with   the procedure.    DESCRIPTION OF PROCEDURE:  After appropriate informed consent was obtained,   the patient was placed prone on the table.  Her skin was thoroughly cleansed   with Betadine swabs x3.  Then, the subcutaneous, intramuscular,   interligamentous region was anesthetized with lidocaine.  A 3.5-inch Tuohy was   directed under intermittent fluoroscopic guidance to lamina initially at   L5-S1.  Once the lamina was detected, the catheter was directed cephalad   immediately and I attempted to utilize loss of resistance technique.  However,   I never had any resistance despite multiple attempts of redirection.    Therefore, this was aborted.  Subsequently, same 3.5-inch Tuohy was directed   under intermittent fluoroscopic guidance at the lamina at L4-5.  Once the   lamina was detected, the catheter was directed cephalad immediately and loss   of resistance technique was used to determine the epidural space.    EPIDUROGRAM:  A small amount of Omnipaque was placed at the left of midline at   L4-5.  This was viewed on AP and lateral projections, it showed a good   cephalad and caudad flow, and the flow  was unrestricted.  Depo-Medrol 120 mg   along with 1.5 mL of 2% lidocaine and an additional 1 mL of bacteriostatic   normal saline for a total of 3.5 was placed.  She tolerated the procedure   well.  She was referred to the recovery area.  Upon sitting in the chair in   the recovery area she felt significant numbness in bilateral feet, left worse   than right.  I then had her try to stand up and she was unable to hold her   weight very well.  So, we had her sit down and monitored her intermittently.    Her feet numbness did turn into paresthesias and then almost resolved within   an hour.  She then was able to stand on her own and ambulate without much   assistance.  She unfortunately did have one bout of urinary incontinence.  She   was discharged and a friend had driven her home.  I then called her back at   12:15 p.m. today.  She tells me she is doing very well without any back pain   and no longer has any numbness or weakness in the legs, and no further bouts   of incontinence.       ____________________________________     ALFONSO MARTINEZ MD    AT / [facility]    DD:  09/08/2017 12:30:58  DT:  09/08/2017 19:56:55    D#:  [id number]  Job#:  [id number]    cc: ALFONSO MARTINEZ MD, LAMONT SANTIAGO MD, TIANNA BROWN MD, CORINNE WINKLER

## 2017-09-12 ENCOUNTER — OFFICE VISIT (OUTPATIENT)
Dept: MEDICAL GROUP | Facility: PHYSICIAN GROUP | Age: 82
End: 2017-09-12
Payer: MEDICARE

## 2017-09-12 VITALS
WEIGHT: 162.4 LBS | RESPIRATION RATE: 16 BRPM | BODY MASS INDEX: 28.77 KG/M2 | DIASTOLIC BLOOD PRESSURE: 62 MMHG | OXYGEN SATURATION: 96 % | TEMPERATURE: 98.1 F | HEART RATE: 71 BPM | SYSTOLIC BLOOD PRESSURE: 114 MMHG | HEIGHT: 63 IN

## 2017-09-12 DIAGNOSIS — I82.509 CHRONIC DEEP VEIN THROMBOSIS (DVT) OF LOWER EXTREMITY, UNSPECIFIED LATERALITY, UNSPECIFIED VEIN (HCC): ICD-10-CM

## 2017-09-12 DIAGNOSIS — I48.91 ATRIAL FIBRILLATION, UNSPECIFIED TYPE (HCC): ICD-10-CM

## 2017-09-12 DIAGNOSIS — R19.7 DIARRHEA, UNSPECIFIED TYPE: ICD-10-CM

## 2017-09-12 DIAGNOSIS — A07.2 DIARRHEA DUE TO CRYPTOSPORIDIUM (HCC): ICD-10-CM

## 2017-09-12 PROCEDURE — 99214 OFFICE O/P EST MOD 30 MIN: CPT | Performed by: NURSE PRACTITIONER

## 2017-09-12 RX ORDER — WARFARIN SODIUM 2.5 MG/1
TABLET ORAL
Qty: 180 TAB | Refills: 1 | Status: SHIPPED | OUTPATIENT
Start: 2017-09-12 | End: 2017-12-28

## 2017-09-12 RX ORDER — NITAZOXANIDE 500 MG/1
500 TABLET ORAL EVERY 12 HOURS
Qty: 6 TAB | Refills: 0 | Status: SHIPPED | OUTPATIENT
Start: 2017-09-12 | End: 2017-09-15

## 2017-09-12 NOTE — PATIENT INSTRUCTIONS
Nitazoxanide tablets  What is this medicine?  NITAZOXANIDE (aurora ta ZOX a nide) is an anti-infective. It is used to treat diarrhea caused by some parasites.  This medicine may be used for other purposes; ask your health care provider or pharmacist if you have questions.  COMMON BRAND NAME(S): Francisca  What should I tell my health care provider before I take this medicine?  They need to know if you have any of these conditions:  -bile or gallbladder problems  -immune system problems  -kidney disease  -liver disease  -an unusual or allergic reaction to nitazoxanide, other medicines, foods, dyes, or preservatives  -pregnant or trying to get pregnant  -breast-feeding  How should I use this medicine?  Take this medicine by mouth with a full glass of water. Follow the directions on the prescription label. Take with food. Take your medicine at regular intervals. Do not take your medicine more often than directed. Take all of your medicine as directed even if you think you are better. Do not skip doses or stop your medicine early.  Talk to your pediatrician regarding the use of this medicine in children. While this drug may be prescribed for children as young as 12 years old for selected conditions, precautions do apply.  Overdosage: If you think you have taken too much of this medicine contact a poison control center or emergency room at once.  NOTE: This medicine is only for you. Do not share this medicine with others.  What if I miss a dose?  If you miss a dose, take it as soon as you can. If it is almost time for your next dose, take only that dose. Do not take double or extra doses.  What may interact with this medicine?  -warfarin  This list may not describe all possible interactions. Give your health care provider a list of all the medicines, herbs, non-prescription drugs, or dietary supplements you use. Also tell them if you smoke, drink alcohol, or use illegal drugs. Some items may interact with your medicine.  What  should I watch for while using this medicine?  Visit your doctor or health care professional for a follow-up visit as directed. You may need to have tests done to check that the infection is cleared. Tell your doctor if your symptoms do not improve or if they get worse.  Practice good hygiene to prevent infection of others. Wash your hands, scrub your fingernails and shower often. Every day change and launder linens and undergarments. Scrub toilets often and keep floors clean.  What side effects may I notice from receiving this medicine?  Side effects that you should report to your doctor or health care professional as soon as possible:  -allergic reactions like skin rash, itching or hives, swelling of the face, lips, or tongue  -breathing problems  -fast, irregular heartbeat  -fever, chills  -redness, blistering, peeling or loosening of the skin, including inside the mouth  -tremor  -unusual bleeding, bruising  -unusually weak or tired  Side effects that usually do not require medical attention (report to your doctor or health care professional if they continue or are bothersome):  -bone, muscle pain  -diarrhea  -dizziness  -dry mouth  -headache  -nausea, vomiting  -stomach pain  -urine discoloration  This list may not describe all possible side effects. Call your doctor for medical advice about side effects. You may report side effects to FDA at 1-200-FDA-5557.  Where should I keep my medicine?  Keep out of the reach of children.  Store at room temperature between 15 and 30 degrees C (59 and 86 degrees F). Do not freeze. Keep container tightly closed. Throw away any unused medicine after the expiration date.  NOTE: This sheet is a summary. It may not cover all possible information. If you have questions about this medicine, talk to your doctor, pharmacist, or health care provider.  © 2014, [employer]. (7/13/2009 2:20:18 PM)

## 2017-09-14 ENCOUNTER — ANTICOAGULATION MONITORING (OUTPATIENT)
Dept: VASCULAR LAB | Facility: MEDICAL CENTER | Age: 82
End: 2017-09-14

## 2017-09-14 DIAGNOSIS — Z79.01 LONG TERM CURRENT USE OF ANTICOAGULANTS WITH INR GOAL OF 2.0-3.0: ICD-10-CM

## 2017-09-14 LAB — INR PPP: 1.7 (ref 2–3.5)

## 2017-09-14 NOTE — PROGRESS NOTES
Anticoagulation Summary  As of 9/14/2017    INR goal:   2.0-3.0   TTR:   53.0 % (1.5 y)   Today's INR:   1.7!   Maintenance plan:   2.5 mg (2.5 mg x 1) on Mon, Wed, Fri; 5 mg (2.5 mg x 2) all other days   Weekly total:   27.5 mg   Plan last modified:   Jasmyne Macdonald, PharmD (8/18/2017)   Next INR check:   9/21/2017   Target end date:       Indications    Long term current use of anticoagulants with INR goal of 2.0-3.0 [Z79.01]  Deep vein thrombosis (CMS-HCC) [I82.409]  Atrial fibrillation [I48.91] [I48.91]             Anticoagulation Episode Summary     INR check location:   Home Draw    Preferred lab:       Send INR reminders to:       Comments:         Anticoagulation Care Providers     Provider Role Specialty Phone number    ELBA Baeza Referring Family Medicine [phone]    Jasmyne Macdonald, PharmD Responsible          Anticoagulation Patient Findings    Spoke with patient.  INR is SUB therapeutic.   Pt denies any unusual s/s of bleeding, bruising, clotting or any changes to diet or medications. Denies any etoh, cranberries, supplements, or illness.   Pt verifies warfarin weekly dosing.     Will have pt take a boost dose of 7.5mg today and then resume her normal weekly dose.    Repeat INR in 2 weeks at pt request.     Jasmyne Macdonald, PharmD

## 2017-09-17 PROBLEM — R19.7 DIARRHEA: Status: ACTIVE | Noted: 2017-09-17

## 2017-09-18 NOTE — PROGRESS NOTES
Chief Complaint   Patient presents with   • Medication Refill     Coumadin    • Results     CRYPTO/GIARDIA RAPID ASSAY        HPI:    Marry Mathur is a 82 y.o. female here to establish care and to discuss the following:    Diarrhea  Patient reports having diarrhea and testing positive for cryptosporidium. She has a letter from the [facility] recommending she takes Alinia to treat the parasite. She states diarrhea is improving and she has intermittent  Lower abdominal pain.    Atrial fibrillation [I48.91]  Chronic condition: Patient has chronic atrial fibrillation. Patient is currently taking warfarin for anticoagulation and is managed by the coumadin clinic. 2.0-3.0. Patient has had a previous stroke 17 years ago. She is requesting medication refill      Current medicines (including changes today)  Current Outpatient Prescriptions   Medication Sig Dispense Refill   • warfarin (COUMADIN) 2.5 MG Tab Take 1 to 2 tablets by mouth daily as directed by the coumadin clinic 180 Tab 1   • hydrocodone-acetaminophen (NORCO) 5-325 MG Tab per tablet 1 tab po bid for severe joint pain, NO ALCOHOL , NO DRIVING and NO MARIJUANA  within 24 hrs of taking this medication, NO BENZODIAZEPINE medications, no selling or giving to any other persons 30 Tab 0   • Psyllium (METAMUCIL FIBER PO) Take  by mouth.     • Wheat Dextrin (BENEFIBER DRINK MIX PO) Take  by mouth.     • methotrexate 2.5 MG Tab 6 tabs po q wednesday 72 Tab 0   • Adalimumab 40 MG/0.8ML Prefilled Syringe Kit Inject 0.8 mL as instructed every 14 days. 6 Each 1   • valsartan-hydrochlorothiazide (DIOVAN HCT) 80-12.5 MG per tablet Take 0.5 Tabs by mouth every day. 45 Tab 1   • atorvastatin (LIPITOR) 20 MG Tab Take 1 Tab by mouth every day. 90 Tab 1   • tizanidine (ZANAFLEX) 2 MG capsule 1-2 tabs po qhs for muscle relaxer 60 Cap 2   • omeprazole (PRILOSEC) 20 MG delayed-release capsule Take 1 Cap by mouth every day. 90 Cap 3   • metformin (GLUCOPHAGE) 500 MG Tab Take 1  Tab by mouth every day. 90 Tab 3   • lidocaine (LIDODERM) 5 % Patch Apply 1 Patch to skin as directed every 24 hours. 90 Patch 1   • pregabalin (LYRICA) 75 MG Cap Take 1 Cap by mouth 2 times a day. 180 Cap 1   • acetaminophen (TYLENOL) 325 MG Tab Take 650 mg by mouth at bedtime as needed.     • folic acid (FOLVITE) 400 MCG tablet Take 400 mcg by mouth every day.     • vitamin D (CHOLECALCIFEROL) 1000 UNIT Tab Take 1,000 Units by mouth every day.     • POTASSIUM GLUCONATE Take 1 Tab by mouth every day.     • magnesium oxide (MAG-OX) 400 MG TABS Take 400 mg by mouth 2 times a day.     • Calcium Carbonate-Vitamin D (CALCIUM + D PO) Take 1 Tab by mouth every day.       No current facility-administered medications for this visit.      She  has a past medical history of Blood clotting disorder (CMS-HCC) (); Cancer (CMS-HCC); Chronic back pain greater than 3 months duration; Hiatus hernia syndrome; Hyperlipidemia; Hypertension; Prediabetes; and Rheumatoid arthritis with rheumatoid factor (CMS-HCC).  She  has a past surgical history that includes hysterectomy, total abdominal (); athroplasty; cholecystectomy; other orthopedic surgery (); other orthopedic surgery (); and inguinal hernia repair (Right, 2016).  Social History   Substance Use Topics   • Smoking status: Never Smoker   • Smokeless tobacco: Never Used   • Alcohol use No     Social History     Social History Narrative   • No narrative on file     Family History   Problem Relation Age of Onset   • Cancer Mother      stomach   • Cancer Father      colon     Family Status   Relation Status   • Mother    • Father          ROS  Review of Systems   Constitutional: Negative for fever, chills, weight loss and malaise/fatigue.   HENT: Negative for ear pain, nosebleeds, congestion, sore throat and neck pain.    Eyes: Negative for blurred vision.   Respiratory: Negative for cough, sputum production, shortness of breath and wheezing.   "  Cardiovascular: Negative for chest pain, palpitations,  and leg swelling.   Gastrointestinal: Negative for heartburn, nausea, vomiting. Positive  diarrhea and abdominal pain.   Genitourinary: Negative for dysuria, urgency and frequency.   Musculoskeletal: Negative for myalgias, back pain and joint pain.   Skin: Negative for rash and itching.   Neurological: Negative for dizziness, tingling, tremors, sensory change, focal weakness and headaches.   Endo/Heme/Allergies: Does not bruise/bleed easily.   Psychiatric/Behavioral: Negative for depression, anxiety, suicidal ideas, insomnia and memory loss.      All other systems reviewed and are negative except as in HPI.       Objective:     Blood pressure 114/62, pulse 71, temperature 36.7 °C (98.1 °F), resp. rate 16, height 1.6 m (5' 3\"), weight 73.7 kg (162 lb 6.4 oz), SpO2 96 %. Body mass index is 28.77 kg/m².  Physical Exam:  Constitutional: Alert, no distress.  Skin: Warm, dry, good turgor, no rashes in visible areas.  Eye: Equal, round and reactive, conjunctiva clear, lids normal.  ENMT: Lips without lesions, good dentition, oropharynx clear. Ear canals are clear, TMs within normal limits bilaterally.   Neck: Trachea midline, no masses, no thyromegaly. No cervical or supraclavicular lymphadenopathy.  Respiratory: Unlabored respiratory effort, lungs clear to auscultation, no wheezes, no ronchi.  Cardiovascular: Normal S1, S2, no murmur, no edema.  Abdomen: Soft, lower quadrant tenderness, no masses, no hepatosplenomegaly.  Psych: Alert and oriented x3, normal affect and mood.  MS: Ambulates independently with steady gait. Has full range of motion of all extremities and spine.  .        Assessment and Plan:   The following treatment plan was discussed   1. 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 Atrial fibrillation, unspecified type (CMS-HCC)  warfarin (COUMADIN) 2.5 MG Tab   3. Chronic deep vein thrombosis (DVT) of lower " extremity, unspecified laterality, unspecified vein (CMS-HCC)  warfarin (COUMADIN) 2.5 MG Tab   4. Diarrhea, unspecified type         Followup: Return if symptoms worsen or fail to improve.   Please note that this dictation was created using voice recognition software. I have made every reasonable attempt to correct obvious errors, but I expect that there are errors of grammar and possibly content that I did not discover before finalizing the note.

## 2017-09-18 NOTE — ASSESSMENT & PLAN NOTE
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 She has a letter from the [facility] recommending she takes Alinia to treat the parasite. She states diarrhea is improving and she has intermittent  Lower abdominal pain.

## 2017-09-18 NOTE — ASSESSMENT & PLAN NOTE
Chronic condition: Patient has chronic atrial fibrillation. Patient is currently taking warfarin for anticoagulation and is managed by the coumadin clinic. 2.0-3.0. Patient has had a previous stroke 17 years ago. She is requesting medication refill

## 2017-09-27 ENCOUNTER — ANTICOAGULATION MONITORING (OUTPATIENT)
Dept: VASCULAR LAB | Facility: MEDICAL CENTER | Age: 82
End: 2017-09-27

## 2017-09-27 DIAGNOSIS — Z79.01 LONG TERM CURRENT USE OF ANTICOAGULANTS WITH INR GOAL OF 2.0-3.0: ICD-10-CM

## 2017-09-27 LAB — INR PPP: 3.1 (ref 2–3.5)

## 2017-09-27 NOTE — PROGRESS NOTES
Anticoagulation Summary  As of 9/27/2017    INR goal:   2.0-3.0   TTR:   53.4 % (1.5 y)   Today's INR:   3.1!   Maintenance plan:   2.5 mg (2.5 mg x 1) on Tue, Sat; 5 mg (2.5 mg x 2) all other days   Weekly total:   30 mg   Plan last modified:   Toney Spicer, PharmD (9/27/2017)   Next INR check:   10/11/2017   Target end date:       Indications    Long term current use of anticoagulants with INR goal of 2.0-3.0 [Z79.01]  Deep vein thrombosis (CMS-HCC) [I82.409]  Atrial fibrillation [I48.91] [I48.91]             Anticoagulation Episode Summary     INR check location:   Home Draw    Preferred lab:       Send INR reminders to:       Comments:         Anticoagulation Care Providers     Provider Role Specialty Phone number    ELBA Baeza Referring Family Medicine [phone]    Jasmyne Macdonald, PharmD Responsible          Anticoagulation Patient Findings      HPI:  Marry Mathur, on anticoagulation therapy with warfarin for DVT  Changes to current medical/health status since last appt: None.  Denies signs/symptoms of bleeding and/or thrombosis since the last appt.    Denies any interval changes to diet  Denies any interval changes to medications since last appt.   Denies any complications or cost restrictions with current therapy.   Confirmed dosing regimen. Pt had a different dosing regimen than calendar reflected, changed future dosing to reflect how pt was taking warfarin.    A/P   INR  SUPRA-therapeutic.   Reduce today then Pt is to continue with current warfarin dosing regimen.     Next INR in 2 week(s).    Toney Spicer, PharmD

## 2017-09-28 ENCOUNTER — APPOINTMENT (OUTPATIENT)
Dept: RADIOLOGY | Facility: MEDICAL CENTER | Age: 82
End: 2017-09-28
Attending: EMERGENCY MEDICINE
Payer: MEDICARE

## 2017-09-28 ENCOUNTER — APPOINTMENT (RX ONLY)
Dept: URBAN - METROPOLITAN AREA CLINIC 36 | Facility: CLINIC | Age: 82
Setting detail: DERMATOLOGY
End: 2017-09-28

## 2017-09-28 ENCOUNTER — HOSPITAL ENCOUNTER (EMERGENCY)
Facility: MEDICAL CENTER | Age: 82
End: 2017-09-29
Attending: EMERGENCY MEDICINE
Payer: MEDICARE

## 2017-09-28 VITALS
OXYGEN SATURATION: 93 % | WEIGHT: 169.97 LBS | TEMPERATURE: 100 F | SYSTOLIC BLOOD PRESSURE: 107 MMHG | RESPIRATION RATE: 14 BRPM | DIASTOLIC BLOOD PRESSURE: 59 MMHG | BODY MASS INDEX: 30.11 KG/M2 | HEART RATE: 63 BPM

## 2017-09-28 VITALS — TEMPERATURE: 102.5 F

## 2017-09-28 DIAGNOSIS — J40 BRONCHITIS: ICD-10-CM

## 2017-09-28 PROBLEM — D03.4 MELANOMA IN SITU OF SCALP AND NECK: Status: ACTIVE | Noted: 2017-09-28

## 2017-09-28 LAB
ALBUMIN SERPL BCP-MCNC: 3.5 G/DL (ref 3.2–4.9)
ALBUMIN/GLOB SERPL: 1.3 G/DL
ALP SERPL-CCNC: 39 U/L (ref 30–99)
ALT SERPL-CCNC: 42 U/L (ref 2–50)
ANION GAP SERPL CALC-SCNC: 7 MMOL/L (ref 0–11.9)
APPEARANCE UR: CLEAR
AST SERPL-CCNC: 32 U/L (ref 12–45)
BACTERIA #/AREA URNS HPF: NEGATIVE /HPF
BASOPHILS # BLD AUTO: 0.4 % (ref 0–1.8)
BASOPHILS # BLD: 0.02 K/UL (ref 0–0.12)
BILIRUB SERPL-MCNC: 0.8 MG/DL (ref 0.1–1.5)
BILIRUB UR QL STRIP.AUTO: NEGATIVE
BUN SERPL-MCNC: 17 MG/DL (ref 8–22)
CALCIUM SERPL-MCNC: 8.2 MG/DL (ref 8.5–10.5)
CHLORIDE SERPL-SCNC: 98 MMOL/L (ref 96–112)
CO2 SERPL-SCNC: 23 MMOL/L (ref 20–33)
COLOR UR: YELLOW
CREAT SERPL-MCNC: 0.62 MG/DL (ref 0.5–1.4)
EOSINOPHIL # BLD AUTO: 0 K/UL (ref 0–0.51)
EOSINOPHIL NFR BLD: 0 % (ref 0–6.9)
EPI CELLS #/AREA URNS HPF: ABNORMAL /HPF
ERYTHROCYTE [DISTWIDTH] IN BLOOD BY AUTOMATED COUNT: 48.5 FL (ref 35.9–50)
FLUAV+FLUBV AG SPEC QL IA: NORMAL
GFR SERPL CREATININE-BSD FRML MDRD: >60 ML/MIN/1.73 M 2
GLOBULIN SER CALC-MCNC: 2.6 G/DL (ref 1.9–3.5)
GLUCOSE SERPL-MCNC: 107 MG/DL (ref 65–99)
GLUCOSE UR STRIP.AUTO-MCNC: NEGATIVE MG/DL
HCT VFR BLD AUTO: 36.3 % (ref 37–47)
HGB BLD-MCNC: 12.5 G/DL (ref 12–16)
HYALINE CASTS #/AREA URNS LPF: ABNORMAL /LPF
IMM GRANULOCYTES # BLD AUTO: 0.01 K/UL (ref 0–0.11)
IMM GRANULOCYTES NFR BLD AUTO: 0.2 % (ref 0–0.9)
KETONES UR STRIP.AUTO-MCNC: NEGATIVE MG/DL
LACTATE BLD-SCNC: 0.9 MMOL/L (ref 0.5–2)
LEUKOCYTE ESTERASE UR QL STRIP.AUTO: NEGATIVE
LYMPHOCYTES # BLD AUTO: 0.73 K/UL (ref 1–4.8)
LYMPHOCYTES NFR BLD: 15.7 % (ref 22–41)
MCH RBC QN AUTO: 32.4 PG (ref 27–33)
MCHC RBC AUTO-ENTMCNC: 34.4 G/DL (ref 33.6–35)
MCV RBC AUTO: 94 FL (ref 81.4–97.8)
MICRO URNS: ABNORMAL
MONOCYTES # BLD AUTO: 0.74 K/UL (ref 0–0.85)
MONOCYTES NFR BLD AUTO: 15.9 % (ref 0–13.4)
NEUTROPHILS # BLD AUTO: 3.16 K/UL (ref 2–7.15)
NEUTROPHILS NFR BLD: 67.8 % (ref 44–72)
NITRITE UR QL STRIP.AUTO: NEGATIVE
NRBC # BLD AUTO: 0 K/UL
NRBC BLD AUTO-RTO: 0 /100 WBC
PH UR STRIP.AUTO: 7 [PH]
PLATELET # BLD AUTO: 124 K/UL (ref 164–446)
PMV BLD AUTO: 11.3 FL (ref 9–12.9)
POTASSIUM SERPL-SCNC: 3.6 MMOL/L (ref 3.6–5.5)
PROT SERPL-MCNC: 6.1 G/DL (ref 6–8.2)
PROT UR QL STRIP: 30 MG/DL
RBC # BLD AUTO: 3.86 M/UL (ref 4.2–5.4)
RBC # URNS HPF: ABNORMAL /HPF
RBC UR QL AUTO: NEGATIVE
SIGNIFICANT IND 70042: NORMAL
SITE SITE: NORMAL
SODIUM SERPL-SCNC: 128 MMOL/L (ref 135–145)
SOURCE SOURCE: NORMAL
SP GR UR STRIP.AUTO: 1.02
UROBILINOGEN UR STRIP.AUTO-MCNC: 1 MG/DL
WBC # BLD AUTO: 4.7 K/UL (ref 4.8–10.8)
WBC #/AREA URNS HPF: ABNORMAL /HPF

## 2017-09-28 PROCEDURE — 93005 ELECTROCARDIOGRAM TRACING: CPT | Performed by: EMERGENCY MEDICINE

## 2017-09-28 PROCEDURE — 304562 HCHG STAT O2 MASK/CANNULA

## 2017-09-28 PROCEDURE — 71010 DX-CHEST-PORTABLE (1 VIEW): CPT

## 2017-09-28 PROCEDURE — 80053 COMPREHEN METABOLIC PANEL: CPT

## 2017-09-28 PROCEDURE — 99284 EMERGENCY DEPT VISIT MOD MDM: CPT

## 2017-09-28 PROCEDURE — 85025 COMPLETE CBC W/AUTO DIFF WBC: CPT

## 2017-09-28 PROCEDURE — 11623 EXC S/N/H/F/G MAL+MRG 2.1-3: CPT

## 2017-09-28 PROCEDURE — 87086 URINE CULTURE/COLONY COUNT: CPT

## 2017-09-28 PROCEDURE — 13132 CMPLX RPR F/C/C/M/N/AX/G/H/F: CPT

## 2017-09-28 PROCEDURE — 700105 HCHG RX REV CODE 258: Performed by: EMERGENCY MEDICINE

## 2017-09-28 PROCEDURE — 87400 INFLUENZA A/B EACH AG IA: CPT

## 2017-09-28 PROCEDURE — ? EXCISION

## 2017-09-28 PROCEDURE — 304561 HCHG STAT O2

## 2017-09-28 PROCEDURE — 83605 ASSAY OF LACTIC ACID: CPT

## 2017-09-28 PROCEDURE — 87040 BLOOD CULTURE FOR BACTERIA: CPT

## 2017-09-28 PROCEDURE — 81001 URINALYSIS AUTO W/SCOPE: CPT

## 2017-09-28 RX ORDER — AZITHROMYCIN 250 MG/1
TABLET, FILM COATED ORAL
Qty: 6 TAB | Refills: 0 | Status: SHIPPED | OUTPATIENT
Start: 2017-09-28 | End: 2017-10-19

## 2017-09-28 RX ORDER — SODIUM CHLORIDE 9 MG/ML
1000 INJECTION, SOLUTION INTRAVENOUS ONCE
Status: COMPLETED | OUTPATIENT
Start: 2017-09-28 | End: 2017-09-28

## 2017-09-28 RX ORDER — ACETAMINOPHEN 325 MG/1
650 TABLET ORAL ONCE
Status: DISCONTINUED | OUTPATIENT
Start: 2017-09-28 | End: 2017-09-29 | Stop reason: HOSPADM

## 2017-09-28 RX ADMIN — SODIUM CHLORIDE 1000 ML: 9 INJECTION, SOLUTION INTRAVENOUS at 21:00

## 2017-09-28 NOTE — PROCEDURE: EXCISION
Patient Will Remove Sutures At Home?: No
Complex Repair And Double Advancement Flap Text: The defect edges were debeveled with a #15 scalpel blade.  The primary defect was closed partially with a complex linear closure.  Given the location of the remaining defect, shape of the defect and the proximity to free margins a double advancement flap was deemed most appropriate for complete closure of the defect.  Using a sterile surgical marker, an appropriate advancement flap was drawn incorporating the defect and placing the expected incisions within the relaxed skin tension lines where possible.    The area thus outlined was incised deep to adipose tissue with a #15 scalpel blade.  The skin margins were undermined to an appropriate distance in all directions utilizing iris scissors.
Complex Repair And V-Y Plasty Text: The defect edges were debeveled with a #15 scalpel blade.  The primary defect was closed partially with a complex linear closure.  Given the location of the remaining defect, shape of the defect and the proximity to free margins a V-Y plasty was deemed most appropriate for complete closure of the defect.  Using a sterile surgical marker, an appropriate advancement flap was drawn incorporating the defect and placing the expected incisions within the relaxed skin tension lines where possible.    The area thus outlined was incised deep to adipose tissue with a #15 scalpel blade.  The skin margins were undermined to an appropriate distance in all directions utilizing iris scissors.
Dermal Autograft Text: The defect edges were debeveled with a #15 scalpel blade.  Given the location of the defect, shape of the defect and the proximity to free margins a dermal autograft was deemed most appropriate.  Using a sterile surgical marker, the primary defect shape was transferred to the donor site. The area thus outlined was incised deep to adipose tissue with a #15 scalpel blade.  The harvested graft was then trimmed of adipose and epidermal tissue until only dermis was left.  The skin graft was then placed in the primary defect and oriented appropriately.
V-Y Flap Text: The defect edges were debeveled with a #15 scalpel blade.  Given the location of the defect, shape of the defect and the proximity to free margins a V-Y flap was deemed most appropriate.  Using a sterile surgical marker, an appropriate advancement flap was drawn incorporating the defect and placing the expected incisions within the relaxed skin tension lines where possible.    The area thus outlined was incised deep to adipose tissue with a #15 scalpel blade.  The skin margins were undermined to an appropriate distance in all directions utilizing iris scissors.
Home Suture Removal Text: Patient was provided a home suture removal kit and will remove their sutures at home.  If they have any questions or difficulties they will call the office.
Slit Excision Additional Text (Leave Blank If You Do Not Want): A linear line was drawn on the skin overlying the lesion. An incision was made slowly until the lesion was visualized.  Once visualized, the lesion was removed with blunt dissection.
Complex Repair And Dermal Autograft Text: The defect edges were debeveled with a #15 scalpel blade.  The primary defect was closed partially with a complex linear closure.  Given the location of the defect, shape of the defect and the proximity to free margins an dermal autograft was deemed most appropriate to repair the remaining defect.  The graft was trimmed to fit the size of the remaining defect.  The graft was then placed in the primary defect, oriented appropriately, and sutured into place.
Complex Repair And A-T Advancement Flap Text: The defect edges were debeveled with a #15 scalpel blade.  The primary defect was closed partially with a complex linear closure.  Given the location of the remaining defect, shape of the defect and the proximity to free margins an A-T advancement flap was deemed most appropriate for complete closure of the defect.  Using a sterile surgical marker, an appropriate advancement flap was drawn incorporating the defect and placing the expected incisions within the relaxed skin tension lines where possible.    The area thus outlined was incised deep to adipose tissue with a #15 scalpel blade.  The skin margins were undermined to an appropriate distance in all directions utilizing iris scissors.
Skin Substitute Units (Will Override Primary Defect Units If Greater Than 0): 0
Complex Repair And Melolabial Flap Text: The defect edges were debeveled with a #15 scalpel blade.  The primary defect was closed partially with a complex linear closure.  Given the location of the remaining defect, shape of the defect and the proximity to free margins a melolabial flap was deemed most appropriate for complete closure of the defect.  Using a sterile surgical marker, an appropriate advancement flap was drawn incorporating the defect and placing the expected incisions within the relaxed skin tension lines where possible.    The area thus outlined was incised deep to adipose tissue with a #15 scalpel blade.  The skin margins were undermined to an appropriate distance in all directions utilizing iris scissors.
Burow's Advancement Flap Text: The defect edges were debeveled with a #15 scalpel blade.  Given the location of the defect and the proximity to free margins a Burow's advancement flap was deemed most appropriate.  Using a sterile surgical marker, the appropriate advancement flap was drawn incorporating the defect and placing the expected incisions within the relaxed skin tension lines where possible.    The area thus outlined was incised deep to adipose tissue with a #15 scalpel blade.  The skin margins were undermined to an appropriate distance in all directions utilizing iris scissors.
Intermediate Repair Preamble Text (Leave Blank If You Do Not Want): Undermining was performed with blunt dissection.
W Plasty Text: The lesion was extirpated to the level of the fat with a #15 scalpel blade.  Given the location of the defect, shape of the defect and the proximity to free margins a W-plasty was deemed most appropriate for repair.  Using a sterile surgical marker, the appropriate transposition arms of the W-plasty were drawn incorporating the defect and placing the expected incisions within the relaxed skin tension lines where possible.    The area thus outlined was incised deep to adipose tissue with a #15 scalpel blade.  The skin margins were undermined to an appropriate distance in all directions utilizing iris scissors.  The opposing transposition arms were then transposed into place in opposite direction and anchored with interrupted buried subcutaneous sutures.
Primary Defect Length (In Cm): 2.4
H Plasty Text: Given the location of the defect, shape of the defect and the proximity to free margins a H-plasty was deemed most appropriate for repair.  Using a sterile surgical marker, the appropriate advancement arms of the H-plasty were drawn incorporating the defect and placing the expected incisions within the relaxed skin tension lines where possible. The area thus outlined was incised deep to adipose tissue with a #15 scalpel blade. The skin margins were undermined to an appropriate distance in all directions utilizing iris scissors.  The opposing advancement arms were then advanced into place in opposite direction and anchored with interrupted buried subcutaneous sutures.
Graft Donor Site Bandage (Optional-Leave Blank If You Don't Want In Note): Steri-strips and a pressure bandage were applied to the donor site.
Excision Method: Curvilinear
Show Surgeon Variable: Yes
Saucerization Excision Additional Text (Leave Blank If You Do Not Want): The margin was drawn around the clinically apparent lesion.  Incisions were then made along these lines, in a tangential fashion, to the appropriate tissue plane and the lesion was extirpated.
Mastoid Interpolation Flap Text: A decision was made to reconstruct the defect utilizing an interpolation axial flap and a staged reconstruction.  A telfa template was made of the defect.  This telfa template was then used to outline the mastoid interpolation flap.  The donor area for the pedicle flap was then injected with anesthesia.  The flap was excised through the skin and subcutaneous tissue down to the layer of the underlying musculature.  The pedicle flap was carefully excised within this deep plane to maintain its blood supply.  The edges of the donor site were undermined.   The donor site was closed in a primary fashion.  The pedicle was then rotated into position and sutured.  Once the tube was sutured into place, adequate blood supply was confirmed with blanching and refill.  The pedicle was then wrapped with xeroform gauze and dressed appropriately with a telfa and gauze bandage to ensure continued blood supply and protect the attached pedicle.
Posterior Auricular Interpolation Flap Text: A decision was made to reconstruct the defect utilizing an interpolation axial flap and a staged reconstruction.  A telfa template was made of the defect.  This telfa template was then used to outline the posterior auricular interpolation flap.  The donor area for the pedicle flap was then injected with anesthesia.  The flap was excised through the skin and subcutaneous tissue down to the layer of the underlying musculature.  The pedicle flap was carefully excised within this deep plane to maintain its blood supply.  The edges of the donor site were undermined.   The donor site was closed in a primary fashion.  The pedicle was then rotated into position and sutured.  Once the tube was sutured into place, adequate blood supply was confirmed with blanching and refill.  The pedicle was then wrapped with xeroform gauze and dressed appropriately with a telfa and gauze bandage to ensure continued blood supply and protect the attached pedicle.
Bi-Rhombic Flap Text: The defect edges were debeveled with a #15 scalpel blade.  Given the location of the defect and the proximity to free margins a bi-rhombic flap was deemed most appropriate.  Using a sterile surgical marker, an appropriate rhombic flap was drawn incorporating the defect. The area thus outlined was incised deep to adipose tissue with a #15 scalpel blade.  The skin margins were undermined to an appropriate distance in all directions utilizing iris scissors.
Complex Repair And Epidermal Autograft Text: The defect edges were debeveled with a #15 scalpel blade.  The primary defect was closed partially with a complex linear closure.  Given the location of the defect, shape of the defect and the proximity to free margins an epidermal autograft was deemed most appropriate to repair the remaining defect.  The graft was trimmed to fit the size of the remaining defect.  The graft was then placed in the primary defect, oriented appropriately, and sutured into place.
Complex Repair And Bilobe Flap Text: The defect edges were debeveled with a #15 scalpel blade.  The primary defect was closed partially with a complex linear closure.  Given the location of the remaining defect, shape of the defect and the proximity to free margins a bilobe flap was deemed most appropriate for complete closure of the defect.  Using a sterile surgical marker, an appropriate advancement flap was drawn incorporating the defect and placing the expected incisions within the relaxed skin tension lines where possible.    The area thus outlined was incised deep to adipose tissue with a #15 scalpel blade.  The skin margins were undermined to an appropriate distance in all directions utilizing iris scissors.
Complex Repair And Transposition Flap Text: The defect edges were debeveled with a #15 scalpel blade.  The primary defect was closed partially with a complex linear closure.  Given the location of the remaining defect, shape of the defect and the proximity to free margins a transposition flap was deemed most appropriate for complete closure of the defect.  Using a sterile surgical marker, an appropriate advancement flap was drawn incorporating the defect and placing the expected incisions within the relaxed skin tension lines where possible.    The area thus outlined was incised deep to adipose tissue with a #15 scalpel blade.  The skin margins were undermined to an appropriate distance in all directions utilizing iris scissors.
Double Island Pedicle Flap Text: The defect edges were debeveled with a #15 scalpel blade.  Given the location of the defect, shape of the defect and the proximity to free margins a double island pedicle advancement flap was deemed most appropriate.  Using a sterile surgical marker, an appropriate advancement flap was drawn incorporating the defect, outlining the appropriate donor tissue and placing the expected incisions within the relaxed skin tension lines where possible.    The area thus outlined was incised deep to adipose tissue with a #15 scalpel blade.  The skin margins were undermined to an appropriate distance in all directions around the primary defect and laterally outward around the island pedicle utilizing iris scissors.  There was minimal undermining beneath the pedicle flap.
Path Notes (To The Dermatopathologist): Please check margins.
Dorsal Nasal Flap Text: The defect edges were debeveled with a #15 scalpel blade.  Given the location of the defect and the proximity to free margins a dorsal nasal flap was deemed most appropriate.  Using a sterile surgical marker, an appropriate dorsal nasal flap was drawn around the defect.    The area thus outlined was incised deep to adipose tissue with a #15 scalpel blade.  The skin margins were undermined to an appropriate distance in all directions utilizing iris scissors.
Split-Thickness Skin Graft Text: The defect edges were debeveled with a #15 scalpel blade.  Given the location of the defect, shape of the defect and the proximity to free margins a split thickness skin graft was deemed most appropriate.  Using a sterile surgical marker, the primary defect shape was transferred to the donor site. The split thickness graft was then harvested.  The skin graft was then placed in the primary defect and oriented appropriately.
Surgeon (Optional): Jani Gupta M.D.
Anesthesia Volume In Cc: 6
Complex Repair And W Plasty Text: The defect edges were debeveled with a #15 scalpel blade.  The primary defect was closed partially with a complex linear closure.  Given the location of the remaining defect, shape of the defect and the proximity to free margins a W plasty was deemed most appropriate for complete closure of the defect.  Using a sterile surgical marker, an appropriate advancement flap was drawn incorporating the defect and placing the expected incisions within the relaxed skin tension lines where possible.    The area thus outlined was incised deep to adipose tissue with a #15 scalpel blade.  The skin margins were undermined to an appropriate distance in all directions utilizing iris scissors.
Hemostasis: Electrocautery
Size Of Margin In Cm: 0.5
Complex Repair And Z Plasty Text: The defect edges were debeveled with a #15 scalpel blade.  The primary defect was closed partially with a complex linear closure.  Given the location of the remaining defect, shape of the defect and the proximity to free margins a Z plasty was deemed most appropriate for complete closure of the defect.  Using a sterile surgical marker, an appropriate advancement flap was drawn incorporating the defect and placing the expected incisions within the relaxed skin tension lines where possible.    The area thus outlined was incised deep to adipose tissue with a #15 scalpel blade.  The skin margins were undermined to an appropriate distance in all directions utilizing iris scissors.
Advancement Flap (Double) Text: The defect edges were debeveled with a #15 scalpel blade.  Given the location of the defect and the proximity to free margins a double advancement flap was deemed most appropriate.  Using a sterile surgical marker, the appropriate advancement flaps were drawn incorporating the defect and placing the expected incisions within the relaxed skin tension lines where possible.    The area thus outlined was incised deep to adipose tissue with a #15 scalpel blade.  The skin margins were undermined to an appropriate distance in all directions utilizing iris scissors.
V-Y Plasty Text: The defect edges were debeveled with a #15 scalpel blade.  Given the location of the defect, shape of the defect and the proximity to free margins an V-Y advancement flap was deemed most appropriate.  Using a sterile surgical marker, an appropriate advancement flap was drawn incorporating the defect and placing the expected incisions within the relaxed skin tension lines where possible.    The area thus outlined was incised deep to adipose tissue with a #15 scalpel blade.  The skin margins were undermined to an appropriate distance in all directions utilizing iris scissors.
Perilesional Excision Additional Text (Leave Blank If You Do Not Want): The margin was drawn around the clinically apparent lesion. Incisions were then made along these lines to the appropriate tissue plane and the lesion was extirpated.
Ear Star Wedge Flap Text: The defect edges were debeveled with a #15 blade scalpel.  Given the location of the defect and the proximity to free margins (helical rim) an ear star wedge flap was deemed most appropriate.  Using a sterile surgical marker, the appropriate flap was drawn incorporating the defect and placing the expected incisions between the helical rim and antihelix where possible.  The area thus outlined was incised through and through with a #15 scalpel blade.
Dressing: pressure dressing with telfa
Wound Check: 7 days
Modified Advancement Flap Text: The defect edges were debeveled with a #15 scalpel blade.  Given the location of the defect, shape of the defect and the proximity to free margins a modified advancement flap was deemed most appropriate.  Using a sterile surgical marker, an appropriate advancement flap was drawn incorporating the defect and placing the expected incisions within the relaxed skin tension lines where possible.    The area thus outlined was incised deep to adipose tissue with a #15 scalpel blade.  The skin margins were undermined to an appropriate distance in all directions utilizing iris scissors.
Complex Repair And Modified Advancement Flap Text: The defect edges were debeveled with a #15 scalpel blade.  The primary defect was closed partially with a complex linear closure.  Given the location of the remaining defect, shape of the defect and the proximity to free margins a modified advancement flap was deemed most appropriate for complete closure of the defect.  Using a sterile surgical marker, an appropriate advancement flap was drawn incorporating the defect and placing the expected incisions within the relaxed skin tension lines where possible.    The area thus outlined was incised deep to adipose tissue with a #15 scalpel blade.  The skin margins were undermined to an appropriate distance in all directions utilizing iris scissors.
Lab: [id number]
Complex Repair And Rhombic Flap Text: The defect edges were debeveled with a #15 scalpel blade.  The primary defect was closed partially with a complex linear closure.  Given the location of the remaining defect, shape of the defect and the proximity to free margins a rhombic flap was deemed most appropriate for complete closure of the defect.  Using a sterile surgical marker, an appropriate advancement flap was drawn incorporating the defect and placing the expected incisions within the relaxed skin tension lines where possible.    The area thus outlined was incised deep to adipose tissue with a #15 scalpel blade.  The skin margins were undermined to an appropriate distance in all directions utilizing iris scissors.
Advancement-Rotation Flap Text: The defect edges were debeveled with a #15 scalpel blade.  Given the location of the defect, shape of the defect and the proximity to free margins an advancement-rotation flap was deemed most appropriate.  Using a sterile surgical marker, an appropriate flap was drawn incorporating the defect and placing the expected incisions within the relaxed skin tension lines where possible. The area thus outlined was incised deep to adipose tissue with a #15 scalpel blade.  The skin margins were undermined to an appropriate distance in all directions utilizing iris scissors.
X Size Of Lesion In Cm (Optional): 1
Size Of Lesion In Cm: 1.2
Complex Repair And Tissue Cultured Epidermal Autograft Text: The defect edges were debeveled with a #15 scalpel blade.  The primary defect was closed partially with a complex linear closure.  Given the location of the defect, shape of the defect and the proximity to free margins an tissue cultured epidermal autograft was deemed most appropriate to repair the remaining defect.  The graft was trimmed to fit the size of the remaining defect.  The graft was then placed in the primary defect, oriented appropriately, and sutured into place.
O-T Plasty Text: The defect edges were debeveled with a #15 scalpel blade.  Given the location of the defect, shape of the defect and the proximity to free margins an O-T plasty was deemed most appropriate.  Using a sterile surgical marker, an appropriate O-T plasty was drawn incorporating the defect and placing the expected incisions within the relaxed skin tension lines where possible.    The area thus outlined was incised deep to adipose tissue with a #15 scalpel blade.  The skin margins were undermined to an appropriate distance in all directions utilizing iris scissors.
S Plasty Text: Given the location and shape of the defect, and the orientation of relaxed skin tension lines, an S-plasty was deemed most appropriate for repair.  Using a sterile surgical marker, the appropriate outline of the S-plasty was drawn, incorporating the defect and placing the expected incisions within the relaxed skin tension lines where possible.  The area thus outlined was incised deep to adipose tissue with a #15 scalpel blade.  The skin margins were undermined to an appropriate distance in all directions utilizing iris scissors. The skin flaps were advanced over the defect.  The opposing margins were then approximated with interrupted buried subcutaneous sutures.
Curvilinear Excision Additional Text (Leave Blank If You Do Not Want): The margin was drawn around the clinically apparent lesion.  A curvilinear shape was then drawn on the skin incorporating the lesion and margins.  Incisions were then made along these lines to the appropriate tissue plane and the lesion was extirpated.
Bilateral Helical Rim Advancement Flap Text: The defect edges were debeveled with a #15 blade scalpel.  Given the location of the defect and the proximity to free margins (helical rim) a bilateral helical rim advancement flap was deemed most appropriate.  Using a sterile surgical marker, the appropriate advancement flaps were drawn incorporating the defect and placing the expected incisions between the helical rim and antihelix where possible.  The area thus outlined was incised through and through with a #15 scalpel blade.  With a skin hook and iris scissors, the flaps were gently and sharply undermined and freed up.
Excisional Biopsy Additional Text (Leave Blank If You Do Not Want): The margin was drawn around the clinically apparent lesion. An elliptical shape was then drawn on the skin incorporating the lesion and margins.  Incisions were then made along these lines to the appropriate tissue plane and the lesion was extirpated.
Epidermal Autograft Text: The defect edges were debeveled with a #15 scalpel blade.  Given the location of the defect, shape of the defect and the proximity to free margins an epidermal autograft was deemed most appropriate.  Using a sterile surgical marker, the primary defect shape was transferred to the donor site. The epidermal graft was then harvested.  The skin graft was then placed in the primary defect and oriented appropriately.
Rhombic Flap Text: The defect edges were debeveled with a #15 scalpel blade.  Given the location of the defect and the proximity to free margins a rhombic flap was deemed most appropriate.  Using a sterile surgical marker, an appropriate rhombic flap was drawn incorporating the defect.    The area thus outlined was incised deep to adipose tissue with a #15 scalpel blade.  The skin margins were undermined to an appropriate distance in all directions utilizing iris scissors.
Consent was obtained from the patient. The risks and benefits to therapy were discussed in detail. Specifically, the risks of infection, scarring, bleeding, prolonged wound healing, incomplete removal, allergy to anesthesia, nerve injury and recurrence were addressed. Prior to the procedure, the treatment site was clearly identified and confirmed by the patient. All components of Universal Protocol/PAUSE Rule completed.
O-Z Plasty Text: The defect edges were debeveled with a #15 scalpel blade.  Given the location of the defect, shape of the defect and the proximity to free margins an O-Z plasty (double transposition flap) was deemed most appropriate.  Using a sterile surgical marker, the appropriate transposition flaps were drawn incorporating the defect and placing the expected incisions within the relaxed skin tension lines where possible.    The area thus outlined was incised deep to adipose tissue with a #15 scalpel blade.  The skin margins were undermined to an appropriate distance in all directions utilizing iris scissors.  Hemostasis was achieved with electrocautery.  The flaps were then transposed into place, one clockwise and the other counterclockwise, and anchored with interrupted buried subcutaneous sutures.
Complex Repair And Dorsal Nasal Flap Text: The defect edges were debeveled with a #15 scalpel blade.  The primary defect was closed partially with a complex linear closure.  Given the location of the remaining defect, shape of the defect and the proximity to free margins a dorsal nasal flap was deemed most appropriate for complete closure of the defect.  Using a sterile surgical marker, an appropriate flap was drawn incorporating the defect and placing the expected incisions within the relaxed skin tension lines where possible.    The area thus outlined was incised deep to adipose tissue with a #15 scalpel blade.  The skin margins were undermined to an appropriate distance in all directions utilizing iris scissors.
Advancement Flap (Single) Text: The defect edges were debeveled with a #15 scalpel blade.  Given the location of the defect and the proximity to free margins a single advancement flap was deemed most appropriate.  Using a sterile surgical marker, an appropriate advancement flap was drawn incorporating the defect and placing the expected incisions within the relaxed skin tension lines where possible.    The area thus outlined was incised deep to adipose tissue with a #15 scalpel blade.  The skin margins were undermined to an appropriate distance in all directions utilizing iris scissors.
No Repair - Repaired With Adjacent Surgical Defect Text (Leave Blank If You Do Not Want): After the excision the defect was repaired concurrently with another surgical defect which was in close approximation.
Trilobed Flap Text: The defect edges were debeveled with a #15 scalpel blade.  Given the location of the defect and the proximity to free margins a trilobed flap was deemed most appropriate.  Using a sterile surgical marker, an appropriate trilobed flap drawn around the defect.    The area thus outlined was incised deep to adipose tissue with a #15 scalpel blade.  The skin margins were undermined to an appropriate distance in all directions utilizing iris scissors.
Lab Facility: 
Cheek Interpolation Flap Text: A decision was made to reconstruct the defect utilizing an interpolation axial flap and a staged reconstruction.  A telfa template was made of the defect.  This telfa template was then used to outline the Cheek Interpolation flap.  The donor area for the pedicle flap was then injected with anesthesia.  The flap was excised through the skin and subcutaneous tissue down to the layer of the underlying musculature.  The interpolation flap was carefully excised within this deep plane to maintain its blood supply.  The edges of the donor site were undermined.   The donor site was closed in a primary fashion.  The pedicle was then rotated into position and sutured.  Once the tube was sutured into place, adequate blood supply was confirmed with blanching and refill.  The pedicle was then wrapped with xeroform gauze and dressed appropriately with a telfa and gauze bandage to ensure continued blood supply and protect the attached pedicle.
Island Pedicle Flap Text: The defect edges were debeveled with a #15 scalpel blade.  Given the location of the defect, shape of the defect and the proximity to free margins an island pedicle advancement flap was deemed most appropriate.  Using a sterile surgical marker, an appropriate advancement flap was drawn incorporating the defect, outlining the appropriate donor tissue and placing the expected incisions within the relaxed skin tension lines where possible.    The area thus outlined was incised deep to adipose tissue with a #15 scalpel blade.  The skin margins were undermined to an appropriate distance in all directions around the primary defect and laterally outward around the island pedicle utilizing iris scissors.  There was minimal undermining beneath the pedicle flap.
Crescentic Advancement Flap Text: The defect edges were debeveled with a #15 scalpel blade.  Given the location of the defect and the proximity to free margins a crescentic advancement flap was deemed most appropriate.  Using a sterile surgical marker, the appropriate advancement flap was drawn incorporating the defect and placing the expected incisions within the relaxed skin tension lines where possible.    The area thus outlined was incised deep to adipose tissue with a #15 scalpel blade.  The skin margins were undermined to an appropriate distance in all directions utilizing iris scissors.
Scalpel Size: 15 blade
Repair Type: Complex
Bilobed Flap Text: The defect edges were debeveled with a #15 scalpel blade.  Given the location of the defect and the proximity to free margins a bilobe flap was deemed most appropriate.  Using a sterile surgical marker, an appropriate bilobe flap drawn around the defect.    The area thus outlined was incised deep to adipose tissue with a #15 scalpel blade.  The skin margins were undermined to an appropriate distance in all directions utilizing iris scissors.
Partial Purse String (Intermediate) Text: Given the location of the defect and the characteristics of the surrounding skin an intermediate purse string closure was deemed most appropriate.  Undermining was performed circumferentially around the surgical defect.  A purse string suture was then placed and tightened. Wound tension of the circular defect prevented complete closure of the wound.
Undermining Location (Optional): in the deep fat
Repair Performed By Another Provider Text (Leave Blank If You Do Not Want): After the tissue was excised the defect was repaired by another provider.
Muscle Hinge Flap Text: The defect edges were debeveled with a #15 scalpel blade.  Given the size, depth and location of the defect and the proximity to free margins a muscle hinge flap was deemed most appropriate.  Using a sterile surgical marker, an appropriate hinge flap was drawn incorporating the defect. The area thus outlined was incised with a #15 scalpel blade.  The skin margins were undermined to an appropriate distance in all directions utilizing iris scissors.
Billing Type: Third-Party Bill
Detail Level: Detailed
Complex Repair And O-L Flap Text: The defect edges were debeveled with a #15 scalpel blade.  The primary defect was closed partially with a complex linear closure.  Given the location of the remaining defect, shape of the defect and the proximity to free margins an O-L flap was deemed most appropriate for complete closure of the defect.  Using a sterile surgical marker, an appropriate flap was drawn incorporating the defect and placing the expected incisions within the relaxed skin tension lines where possible.    The area thus outlined was incised deep to adipose tissue with a #15 scalpel blade.  The skin margins were undermined to an appropriate distance in all directions utilizing iris scissors.
Cartilage Graft Text: The defect edges were debeveled with a #15 scalpel blade.  Given the location of the defect, shape of the defect, the fact the defect involved a full thickness cartilage defect a cartilage graft was deemed most appropriate.  An appropriate donor site was identified, cleansed, and anesthetized. The cartilage graft was then harvested and transferred to the recipient site, oriented appropriately and then sutured into place.  The secondary defect was then repaired using a primary closure.
Tissue Cultured Epidermal Autograft Text: The defect edges were debeveled with a #15 scalpel blade.  Given the location of the defect, shape of the defect and the proximity to free margins a tissue cultured epidermal autograft was deemed most appropriate.  The graft was then trimmed to fit the size of the defect.  The graft was then placed in the primary defect and oriented appropriately.
Complex Repair And M Plasty Text: The defect edges were debeveled with a #15 scalpel blade.  The primary defect was closed partially with a complex linear closure.  Given the location of the remaining defect, shape of the defect and the proximity to free margins an M plasty was deemed most appropriate for complete closure of the defect.  Using a sterile surgical marker, an appropriate advancement flap was drawn incorporating the defect and placing the expected incisions within the relaxed skin tension lines where possible.    The area thus outlined was incised deep to adipose tissue with a #15 scalpel blade.  The skin margins were undermined to an appropriate distance in all directions utilizing iris scissors.
O-L Flap Text: The defect edges were debeveled with a #15 scalpel blade.  Given the location of the defect, shape of the defect and the proximity to free margins an O-L flap was deemed most appropriate.  Using a sterile surgical marker, an appropriate advancement flap was drawn incorporating the defect and placing the expected incisions within the relaxed skin tension lines where possible.    The area thus outlined was incised deep to adipose tissue with a #15 scalpel blade.  The skin margins were undermined to an appropriate distance in all directions utilizing iris scissors.
Fusiform Excision Additional Text (Leave Blank If You Do Not Want): The margin was drawn around the clinically apparent lesion.  A fusiform shape was then drawn on the skin incorporating the lesion and margins.  Incisions were then made along these lines to the appropriate tissue plane and the lesion was extirpated.
Paramedian Forehead Flap Text: A decision was made to reconstruct the defect utilizing an interpolation axial flap and a staged reconstruction.  A telfa template was made of the defect.  This telfa template was then used to outline the paramedian forehead pedicle flap.  The donor area for the pedicle flap was then injected with anesthesia.  The flap was excised through the skin and subcutaneous tissue down to the layer of the underlying musculature.  The pedicle flap was carefully excised within this deep plane to maintain its blood supply.  The edges of the donor site were undermined.   The donor site was closed in a primary fashion.  The pedicle was then rotated into position and sutured.  Once the tube was sutured into place, adequate blood supply was confirmed with blanching and refill.  The pedicle was then wrapped with xeroform gauze and dressed appropriately with a telfa and gauze bandage to ensure continued blood supply and protect the attached pedicle.
Anesthesia Type: 1% lidocaine with 1:100,000 epinephrine and a 1:12 solution of 8.4% sodium bicarbonate
Lip Wedge Excision Repair Text: Given the location of the defect and the proximity to free margins a full thickness wedge repair was deemed most appropriate.  Using a sterile surgical marker, the appropriate repair was drawn incorporating the defect and placing the expected incisions perpendicular to the vermilion border.  The vermilion border was also meticulously outlined to ensure appropriate reapproximation during the repair.  The area thus outlined was incised through and through with a #15 scalpel blade.  The muscularis and dermis were reaproximated with deep sutures following hemostasis. Care was taken to realign the vermilion border before proceeding with the superficial closure.  Once the vermilion was realigned the superfical and mucosal closure was finished.
Keystone Flap Text: The defect edges were debeveled with a #15 scalpel blade.  Given the location of the defect, shape of the defect a keystone flap was deemed most appropriate.  Using a sterile surgical marker, an appropriate keystone flap was drawn incorporating the defect, outlining the appropriate donor tissue and placing the expected incisions within the relaxed skin tension lines where possible. The area thus outlined was incised deep to adipose tissue with a #15 scalpel blade.  The skin margins were undermined to an appropriate distance in all directions around the primary defect and laterally outward around the flap utilizing iris scissors.
Pre-Excision Curettage Text (Leave Blank If You Do Not Want): Prior to drawing the surgical margin the visible lesion was removed with electrodesiccation and curettage to clearly define the lesion size.
Purse String (Simple) Text: Given the location of the defect and the characteristics of the surrounding skin a purse string simple closure was deemed most appropriate.  Undermining was performed circumferentially around the surgical defect.  A purse string suture was then placed and tightened.
Transposition Flap Text: The defect edges were debeveled with a #15 scalpel blade.  Given the location of the defect and the proximity to free margins a transposition flap was deemed most appropriate.  Using a sterile surgical marker, an appropriate transposition flap was drawn incorporating the defect.    The area thus outlined was incised deep to adipose tissue with a #15 scalpel blade.  The skin margins were undermined to an appropriate distance in all directions utilizing iris scissors.
Epidermal Closure Graft Donor Site (Optional): simple interrupted
Island Pedicle Flap With Canthal Suspension Text: The defect edges were debeveled with a #15 scalpel blade.  Given the location of the defect, shape of the defect and the proximity to free margins an island pedicle advancement flap was deemed most appropriate.  Using a sterile surgical marker, an appropriate advancement flap was drawn incorporating the defect, outlining the appropriate donor tissue and placing the expected incisions within the relaxed skin tension lines where possible. The area thus outlined was incised deep to adipose tissue with a #15 scalpel blade.  The skin margins were undermined to an appropriate distance in all directions around the primary defect and laterally outward around the island pedicle utilizing iris scissors.  There was minimal undermining beneath the pedicle flap. A suspension suture was placed in the canthal tendon to prevent tension and prevent ectropion.
Complex Repair And Skin Substitute Graft Text: The defect edges were debeveled with a #15 scalpel blade.  The primary defect was closed partially with a complex linear closure.  Given the location of the remaining defect, shape of the defect and the proximity to free margins a skin substitute graft was deemed most appropriate to repair the remaining defect.  The graft was trimmed to fit the size of the remaining defect.  The graft was then placed in the primary defect, oriented appropriately, and sutured into place.
Partial Purse String (Simple) Text: Given the location of the defect and the characteristics of the surrounding skin a simple purse string closure was deemed most appropriate.  Undermining was performed circumferentially around the surgical defect.  A purse string suture was then placed and tightened. Wound tension of the circular defect prevented complete closure of the wound.
Cheek-To-Nose Interpolation Flap Text: A decision was made to reconstruct the defect utilizing an interpolation axial flap and a staged reconstruction.  A telfa template was made of the defect.  This telfa template was then used to outline the Cheek-To-Nose Interpolation flap.  The donor area for the pedicle flap was then injected with anesthesia.  The flap was excised through the skin and subcutaneous tissue down to the layer of the underlying musculature.  The interpolation flap was carefully excised within this deep plane to maintain its blood supply.  The edges of the donor site were undermined.   The donor site was closed in a primary fashion.  The pedicle was then rotated into position and sutured.  Once the tube was sutured into place, adequate blood supply was confirmed with blanching and refill.  The pedicle was then wrapped with xeroform gauze and dressed appropriately with a telfa and gauze bandage to ensure continued blood supply and protect the attached pedicle.
Complex Repair And Rotation Flap Text: The defect edges were debeveled with a #15 scalpel blade.  The primary defect was closed partially with a complex linear closure.  Given the location of the remaining defect, shape of the defect and the proximity to free margins a rotation flap was deemed most appropriate for complete closure of the defect.  Using a sterile surgical marker, an appropriate advancement flap was drawn incorporating the defect and placing the expected incisions within the relaxed skin tension lines where possible.    The area thus outlined was incised deep to adipose tissue with a #15 scalpel blade.  The skin margins were undermined to an appropriate distance in all directions utilizing iris scissors.
Deep Sutures: 5-0 Vicryl Rapide
Star Wedge Flap Text: The defect edges were debeveled with a #15 scalpel blade.  Given the location of the defect, shape of the defect and the proximity to free margins a star wedge flap was deemed most appropriate.  Using a sterile surgical marker, an appropriate rotation flap was drawn incorporating the defect and placing the expected incisions within the relaxed skin tension lines where possible. The area thus outlined was incised deep to adipose tissue with a #15 scalpel blade.  The skin margins were undermined to an appropriate distance in all directions utilizing iris scissors.
Complex Repair And Ftsg Text: The defect edges were debeveled with a #15 scalpel blade.  The primary defect was closed partially with a complex linear closure.  Given the location of the defect, shape of the defect and the proximity to free margins a full thickness skin graft was deemed most appropriate to repair the remaining defect.  The graft was trimmed to fit the size of the remaining defect.  The graft was then placed in the primary defect, oriented appropriately, and sutured into place.
Skin Substitute Text: The defect edges were debeveled with a #15 scalpel blade.  Given the location of the defect, shape of the defect and the proximity to free margins a skin substitute graft was deemed most appropriate.  The graft material was trimmed to fit the size of the defect. The graft was then placed in the primary defect and oriented appropriately.
Composite Graft Text: The defect edges were debeveled with a #15 scalpel blade.  Given the location of the defect, shape of the defect, the proximity to free margins and the fact the defect was full thickness a composite graft was deemed most appropriate.  The defect was outline and then transferred to the donor site.  A full thickness graft was then excised from the donor site. The graft was then placed in the primary defect, oriented appropriately and then sutured into place.  The secondary defect was then repaired using a primary closure.
Melolabial Transposition Flap Text: The defect edges were debeveled with a #15 scalpel blade.  Given the location of the defect and the proximity to free margins a melolabial flap was deemed most appropriate.  Using a sterile surgical marker, an appropriate melolabial transposition flap was drawn incorporating the defect.    The area thus outlined was incised deep to adipose tissue with a #15 scalpel blade.  The skin margins were undermined to an appropriate distance in all directions utilizing iris scissors.
Mucosal Advancement Flap Text: Given the location of the defect, shape of the defect and the proximity to free margins a mucosal advancement flap was deemed most appropriate. Incisions were made with a 15 blade scalpel in the appropriate fashion along the cutaneous vermilion border and the mucosal lip. The remaining actinically damaged mucosal tissue was excised.  The mucosal advancement flap was then elevated to the gingival sulcus with care taken to preserve the neurovascular structures and advanced into the primary defect. Care was taken to ensure that precise realignment of the vermilion border was achieved.
Elliptical Excision Additional Text (Leave Blank If You Do Not Want): The margin was drawn around the clinically apparent lesion.  An elliptical shape was then drawn on the skin incorporating the lesion and margins.  Incisions were then made along these lines to the appropriate tissue plane and the lesion was extirpated.
Anesthesia Type: 1% lidocaine with epinephrine
Complex Repair And Single Advancement Flap Text: The defect edges were debeveled with a #15 scalpel blade.  The primary defect was closed partially with a complex linear closure.  Given the location of the remaining defect, shape of the defect and the proximity to free margins a single advancement flap was deemed most appropriate for complete closure of the defect.  Using a sterile surgical marker, an appropriate advancement flap was drawn incorporating the defect and placing the expected incisions within the relaxed skin tension lines where possible.    The area thus outlined was incised deep to adipose tissue with a #15 scalpel blade.  The skin margins were undermined to an appropriate distance in all directions utilizing iris scissors.
Xenograft Text: The defect edges were debeveled with a #15 scalpel blade.  Given the location of the defect, shape of the defect and the proximity to free margins a xenograft was deemed most appropriate.  The graft was then trimmed to fit the size of the defect.  The graft was then placed in the primary defect and oriented appropriately.
Previous Accession (Optional): [id number]
Hatchet Flap Text: The defect edges were debeveled with a #15 scalpel blade.  Given the location of the defect, shape of the defect and the proximity to free margins a hatchet flap was deemed most appropriate.  Using a sterile surgical marker, an appropriate hatchet flap was drawn incorporating the defect and placing the expected incisions within the relaxed skin tension lines where possible.    The area thus outlined was incised deep to adipose tissue with a #15 scalpel blade.  The skin margins were undermined to an appropriate distance in all directions utilizing iris scissors.
Bilobed Transposition Flap Text: The defect edges were debeveled with a #15 scalpel blade.  Given the location of the defect and the proximity to free margins a bilobed transposition flap was deemed most appropriate.  Using a sterile surgical marker, an appropriate bilobe flap drawn around the defect.    The area thus outlined was incised deep to adipose tissue with a #15 scalpel blade.  The skin margins were undermined to an appropriate distance in all directions utilizing iris scissors.
Ftsg Text: The defect edges were debeveled with a #15 scalpel blade.  Given the location of the defect, shape of the defect and the proximity to free margins a full thickness skin graft was deemed most appropriate.  Using a sterile surgical marker, the primary defect shape was transferred to the donor site. The area thus outlined was incised deep to adipose tissue with a #15 scalpel blade.  The harvested graft was then trimmed of adipose tissue until only dermis and epidermis was left.  The skin margins of the secondary defect were undermined to an appropriate distance in all directions utilizing iris scissors.  The secondary defect was closed with interrupted buried subcutaneous sutures.  The skin edges were then re-apposed with running  sutures.  The skin graft was then placed in the primary defect and oriented appropriately.
Wound Care: Vaseline
Island Pedicle Flap-Requiring Vessel Identification Text: The defect edges were debeveled with a #15 scalpel blade.  Given the location of the defect, shape of the defect and the proximity to free margins an island pedicle advancement flap was deemed most appropriate.  Using a sterile surgical marker, an appropriate advancement flap was drawn, based on the axial vessel mentioned above, incorporating the defect, outlining the appropriate donor tissue and placing the expected incisions within the relaxed skin tension lines where possible.    The area thus outlined was incised deep to adipose tissue with a #15 scalpel blade.  The skin margins were undermined to an appropriate distance in all directions around the primary defect and laterally outward around the island pedicle utilizing iris scissors.  There was minimal undermining beneath the pedicle flap.
Z Plasty Text: The lesion was extirpated to the level of the fat with a #15 scalpel blade.  Given the location of the defect, shape of the defect and the proximity to free margins a Z-plasty was deemed most appropriate for repair.  Using a sterile surgical marker, the appropriate transposition arms of the Z-plasty were drawn incorporating the defect and placing the expected incisions within the relaxed skin tension lines where possible.    The area thus outlined was incised deep to adipose tissue with a #15 scalpel blade.  The skin margins were undermined to an appropriate distance in all directions utilizing iris scissors.  The opposing transposition arms were then transposed into place in opposite direction and anchored with interrupted buried subcutaneous sutures.
Spiral Flap Text: The defect edges were debeveled with a #15 scalpel blade.  Given the location of the defect, shape of the defect and the proximity to free margins a spiral flap was deemed most appropriate.  Using a sterile surgical marker, an appropriate rotation flap was drawn incorporating the defect and placing the expected incisions within the relaxed skin tension lines where possible. The area thus outlined was incised deep to adipose tissue with a #15 scalpel blade.  The skin margins were undermined to an appropriate distance in all directions utilizing iris scissors.
Helical Rim Advancement Flap Text: The defect edges were debeveled with a #15 blade scalpel.  Given the location of the defect and the proximity to free margins (helical rim) a double helical rim advancement flap was deemed most appropriate.  Using a sterile surgical marker, the appropriate advancement flaps were drawn incorporating the defect and placing the expected incisions between the helical rim and antihelix where possible.  The area thus outlined was incised through and through with a #15 scalpel blade.  With a skin hook and iris scissors, the flaps were gently and sharply undermined and freed up.
Complex Repair And Split-Thickness Skin Graft Text: The defect edges were debeveled with a #15 scalpel blade.  The primary defect was closed partially with a complex linear closure.  Given the location of the defect, shape of the defect and the proximity to free margins a split thickness skin graft was deemed most appropriate to repair the remaining defect.  The graft was trimmed to fit the size of the remaining defect.  The graft was then placed in the primary defect, oriented appropriately, and sutured into place.
Complex Repair And O-T Advancement Flap Text: The defect edges were debeveled with a #15 scalpel blade.  The primary defect was closed partially with a complex linear closure.  Given the location of the remaining defect, shape of the defect and the proximity to free margins an O-T advancement flap was deemed most appropriate for complete closure of the defect.  Using a sterile surgical marker, an appropriate advancement flap was drawn incorporating the defect and placing the expected incisions within the relaxed skin tension lines where possible.    The area thus outlined was incised deep to adipose tissue with a #15 scalpel blade.  The skin margins were undermined to an appropriate distance in all directions utilizing iris scissors.
O-T Advancement Flap Text: The defect edges were debeveled with a #15 scalpel blade.  Given the location of the defect, shape of the defect and the proximity to free margins an O-T advancement flap was deemed most appropriate.  Using a sterile surgical marker, an appropriate advancement flap was drawn incorporating the defect and placing the expected incisions within the relaxed skin tension lines where possible.    The area thus outlined was incised deep to adipose tissue with a #15 scalpel blade.  The skin margins were undermined to an appropriate distance in all directions utilizing iris scissors.
A-T Advancement Flap Text: The defect edges were debeveled with a #15 scalpel blade.  Given the location of the defect, shape of the defect and the proximity to free margins an A-T advancement flap was deemed most appropriate.  Using a sterile surgical marker, an appropriate advancement flap was drawn incorporating the defect and placing the expected incisions within the relaxed skin tension lines where possible.    The area thus outlined was incised deep to adipose tissue with a #15 scalpel blade.  The skin margins were undermined to an appropriate distance in all directions utilizing iris scissors.
Alar Island Pedicle Flap Text: The defect edges were debeveled with a #15 scalpel blade.  Given the location of the defect, shape of the defect and the proximity to the alar rim an island pedicle advancement flap was deemed most appropriate.  Using a sterile surgical marker, an appropriate advancement flap was drawn incorporating the defect, outlining the appropriate donor tissue and placing the expected incisions within the nasal ala running parallel to the alar rim. The area thus outlined was incised with a #15 scalpel blade.  The skin margins were undermined minimally to an appropriate distance in all directions around the primary defect and laterally outward around the island pedicle utilizing iris scissors.  There was minimal undermining beneath the pedicle flap.
Complex Repair And Xenograft Text: The defect edges were debeveled with a #15 scalpel blade.  The primary defect was closed partially with a complex linear closure.  Given the location of the defect, shape of the defect and the proximity to free margins a xenograft was deemed most appropriate to repair the remaining defect.  The graft was trimmed to fit the size of the remaining defect.  The graft was then placed in the primary defect, oriented appropriately, and sutured into place.
Epidermal Sutures: 5-0 Surgipro
Complex Repair And Double M Plasty Text: The defect edges were debeveled with a #15 scalpel blade.  The primary defect was closed partially with a complex linear closure.  Given the location of the remaining defect, shape of the defect and the proximity to free margins a double M plasty was deemed most appropriate for complete closure of the defect.  Using a sterile surgical marker, an appropriate advancement flap was drawn incorporating the defect and placing the expected incisions within the relaxed skin tension lines where possible.    The area thus outlined was incised deep to adipose tissue with a #15 scalpel blade.  The skin margins were undermined to an appropriate distance in all directions utilizing iris scissors.
Primary Defect Width (In Cm): 2
Purse String (Intermediate) Text: Given the location of the defect and the characteristics of the surrounding skin a purse string intermediate closure was deemed most appropriate.  Undermining was performed circumfirentially around the surgical defect.  A purse string suture was then placed and tightened.
Complex Repair Preamble Text (Leave Blank If You Do Not Want): Extensive wide undermining was performed.
Positioning (Leave Blank If You Do Not Want): The patient was placed in a comfortable position exposing the surgical site.
Epidermal Closure: running cuticular
Rotation Flap Text: The defect edges were debeveled with a #15 scalpel blade.  Given the location of the defect, shape of the defect and the proximity to free margins a rotation flap was deemed most appropriate.  Using a sterile surgical marker, an appropriate rotation flap was drawn incorporating the defect and placing the expected incisions within the relaxed skin tension lines where possible.    The area thus outlined was incised deep to adipose tissue with a #15 scalpel blade.  The skin margins were undermined to an appropriate distance in all directions utilizing iris scissors.
Post-Care Instructions: I reviewed with the patient in detail post-care instructions. Patient is not to engage in any heavy lifting, exercise, or swimming for the next 14 days. Should the patient develop any fevers, chills, bleeding, severe pain patient will contact the office immediately.
Estimated Blood Loss (Cc): minimal
Intermediate / Complex Repair - Final Wound Length In Cm: 7
Interpolation Flap Text: A decision was made to reconstruct the defect utilizing an interpolation axial flap and a staged reconstruction.  A telfa template was made of the defect.  This telfa template was then used to outline the interpolation flap.  The donor area for the pedicle flap was then injected with anesthesia.  The flap was excised through the skin and subcutaneous tissue down to the layer of the underlying musculature.  The interpolation flap was carefully excised within this deep plane to maintain its blood supply.  The edges of the donor site were undermined.   The donor site was closed in a primary fashion.  The pedicle was then rotated into position and sutured.  Once the tube was sutured into place, adequate blood supply was confirmed with blanching and refill.  The pedicle was then wrapped with xeroform gauze and dressed appropriately with a telfa and gauze bandage to ensure continued blood supply and protect the attached pedicle.
Melolabial Interpolation Flap Text: A decision was made to reconstruct the defect utilizing an interpolation axial flap and a staged reconstruction.  A telfa template was made of the defect.  This telfa template was then used to outline the melolabial interpolation flap.  The donor area for the pedicle flap was then injected with anesthesia.  The flap was excised through the skin and subcutaneous tissue down to the layer of the underlying musculature.  The pedicle flap was carefully excised within this deep plane to maintain its blood supply.  The edges of the donor site were undermined.   The donor site was closed in a primary fashion.  The pedicle was then rotated into position and sutured.  Once the tube was sutured into place, adequate blood supply was confirmed with blanching and refill.  The pedicle was then wrapped with xeroform gauze and dressed appropriately with a telfa and gauze bandage to ensure continued blood supply and protect the attached pedicle.
Excision Depth: adipose tissue

## 2017-09-28 NOTE — HPI: PROCEDURE (SKIN SURGERY)
Has The Growth Been Previously Biopsied?: has been previously biopsied
Additional History: [id number] Right ant neck- melanoma in situ
Year Removed: 25 years ago
Location: Left inferior neck

## 2017-09-29 NOTE — DISCHARGE INSTRUCTIONS

## 2017-09-29 NOTE — ED NOTES
Discharge instructions, meds, & f/u appt rv'wd with Pt and family, they verbalize understanding. Prescriptions given x1. Instructed Pt to return for any worsening symptoms. Stable on discharge. Assisted out of ER via WC with family.

## 2017-09-29 NOTE — ED PROVIDER NOTES
ED Provider Note    ER PROVIDER NOTE    Scribed for William Bustillos M.D.  by William Bustillos. 9/28/2017 at 8:35 PM.    Primary Care Provider: ELBA Baeza  Means of Arrival: pt   History obtained from: pt   History limited by: none     CHIEF COMPLAINT  Chief Complaint   Patient presents with   • Flu Like Symptoms     pt reports that she has been having fever and weakness today. denies N/V/D        HPI  Marry Mathur is a 82 y.o. female who presents to the emergency department complaining of Cough and chills.  The patient recently returned from a cruise to [place], and reports that beginning yesterday she began having symptoms. States she has felt generally weakness yesterday woke up with chills and although no fever. She denies any chest pain. She denies abdominal pain, nausea vomiting or diarrhea. She denies any dysuria but has had some urinary frequency    REVIEW OF SYSTEMS  Pertinent positives include fevers. Pertinent negatives include no vomiting. See HPI for details. All other systems reviewed and are negative.    PAST MEDICAL HISTORY   has a past medical history of Blood clotting disorder (CMS-[facility]) (2012); Cancer (CMS-HCC); Chronic back pain greater than 3 months duration; Hiatus hernia syndrome; Hyperlipidemia; Hypertension; Prediabetes; and Rheumatoid arthritis with rheumatoid factor (CMS-HCC).    SURGICAL HISTORY   has a past surgical history that includes hysterectomy, total abdominal (1970's); athroplasty; cholecystectomy; other orthopedic surgery (2014); other orthopedic surgery (2011); and inguinal hernia repair (Right, 9/23/2016).    FAMILY HISTORY  Family History   Problem Relation Age of Onset   • Cancer Mother      stomach   • Cancer Father      colon       SOCIAL HISTORY  Social History     Social History   • Marital status:      Spouse name: N/A   • Number of children: N/A   • Years of education: N/A     Social History Main Topics   • Smoking status: Never Smoker   •  Smokeless tobacco: Never Used   • Alcohol use No   • Drug use: No   • Sexual activity: Not Currently     Partners: Male     Other Topics Concern   • Not on file     Social History Narrative   • No narrative on file      History   Drug Use No       CURRENT MEDICATIONS  Home Medications    **Home medications have not yet been reviewed for this encounter**         ALLERGIES  Allergies   Allergen Reactions   • Sulfa Drugs Vomiting     RXN=young person       PHYSICAL EXAM  VITAL SIGNS: /59   Pulse 78   Temp 37.8 °C (100 °F)   Resp 14   Wt 77.1 kg (169 lb 15.6 oz)   SpO2 96%   BMI 30.11 kg/m²   Pulse ox interpretation: I interpret this pulse ox as normal.    Constitutional: Alert in no apparent distress.  HENT: No signs of trauma, Bilateral external ears normal, Nose normal.   Eyes: Pupils are equal and reactive, Conjunctiva normal, Non-icteric.   Neck: Normal range of motion, No tenderness, Supple, No stridor.   Lymphatic: No lymphadenopathy noted.   Cardiovascular: Regular rate and rhythm, no murmurs.   Thorax & Lungs:Slight decrease in breath sounds bilaterally No respiratory distress, No wheezing, No chest tenderness.   Abdomen: Bowel sounds normal, Soft, No tenderness, No masses, No pulsatile masses. No peritoneal signs.  Skin: Warm, Dry, No erythema, No rash.   Back: No bony tenderness, No CVA tenderness.   Extremities: Intact distal pulses, No edema, No tenderness, No cyanosis, Negative Luz's sign.  Musculoskeletal: Good range of motion in all major joints. No tenderness to palpation or major deformities noted.   Neurologic: Alert , Normal motor function, Normal sensory function, No focal deficits noted.   Psychiatric: Affect normal, Judgment normal, Mood normal.     DIAGNOSTIC STUDIES / PROCEDURES    EKG  Interpreted by me    Rhythm:  Normal sinus rhythm   Rate:60  Axis: normal  Intervals: normal  Ectopy: none  Conduction: normal  ST Segments: no acute change  T Waves: no acute change  Q Waves:  none      LABS  Results for orders placed or performed during the hospital encounter of 09/28/17   CBC WITH DIFFERENTIAL   Result Value Ref Range    WBC 4.7 (L) 4.8 - 10.8 K/uL    RBC 3.86 (L) 4.20 - 5.40 M/uL    Hemoglobin 12.5 12.0 - 16.0 g/dL    Hematocrit 36.3 (L) 37.0 - 47.0 %    MCV 94.0 81.4 - 97.8 fL    MCH 32.4 27.0 - 33.0 pg    MCHC 34.4 33.6 - 35.0 g/dL    RDW 48.5 35.9 - 50.0 fL    Platelet Count 124 (L) 164 - 446 K/uL    MPV 11.3 9.0 - 12.9 fL    Neutrophils-Polys 67.80 44.00 - 72.00 %    Lymphocytes 15.70 (L) 22.00 - 41.00 %    Monocytes 15.90 (H) 0.00 - 13.40 %    Eosinophils 0.00 0.00 - 6.90 %    Basophils 0.40 0.00 - 1.80 %    Immature Granulocytes 0.20 0.00 - 0.90 %    Nucleated RBC 0.00 /100 WBC    Neutrophils (Absolute) 3.16 2.00 - 7.15 K/uL    Lymphs (Absolute) 0.73 (L) 1.00 - 4.80 K/uL    Monos (Absolute) 0.74 0.00 - 0.85 K/uL    Eos (Absolute) 0.00 0.00 - 0.51 K/uL    Baso (Absolute) 0.02 0.00 - 0.12 K/uL    Immature Granulocytes (abs) 0.01 0.00 - 0.11 K/uL    NRBC (Absolute) 0.00 K/uL   COMP METABOLIC PANEL   Result Value Ref Range    Sodium 128 (L) 135 - 145 mmol/L    Potassium 3.6 3.6 - 5.5 mmol/L    Chloride 98 96 - 112 mmol/L    Co2 23 20 - 33 mmol/L    Anion Gap 7.0 0.0 - 11.9    Glucose 107 (H) 65 - 99 mg/dL    Bun 17 8 - 22 mg/dL    Creatinine 0.62 0.50 - 1.40 mg/dL    Calcium 8.2 (L) 8.5 - 10.5 mg/dL    AST(SGOT) 32 12 - 45 U/L    ALT(SGPT) 42 2 - 50 U/L    Alkaline Phosphatase 39 30 - 99 U/L    Total Bilirubin 0.8 0.1 - 1.5 mg/dL    Albumin 3.5 3.2 - 4.9 g/dL    Total Protein 6.1 6.0 - 8.2 g/dL    Globulin 2.6 1.9 - 3.5 g/dL    A-G Ratio 1.3 g/dL   LACTIC ACID   Result Value Ref Range    Lactic Acid 0.9 0.5 - 2.0 mmol/L   URINALYSIS   Result Value Ref Range    Color Yellow     Character Clear     Specific Gravity 1.021 <1.035    Ph 7.0 5.0 - 8.0    Glucose Negative Negative mg/dL    Ketones Negative Negative mg/dL    Protein 30 (A) Negative mg/dL    Bilirubin Negative Negative     Urobilinogen, Urine 1.0 Negative    Nitrite Negative Negative    Leukocyte Esterase Negative Negative    Occult Blood Negative Negative    Micro Urine Req Microscopic    Influenza Rapid   Result Value Ref Range    Significant Indicator NEG     Source RESP     Site RESPIRATORY     Rapid Influenza A-B       Negative for Influenza A and Influenza B antigens.  Infection due to influenza A or B cannot be ruled out  since the antigen present in the specimen may be below the  detection limit of the test. Culture confirmation of  negative samples is recommended.     ESTIMATED GFR   Result Value Ref Range    GFR If African American >60 >60 mL/min/1.73 m 2    GFR If Non African American >60 >60 mL/min/1.73 m 2   URINE MICROSCOPIC (W/UA)   Result Value Ref Range    WBC 0-2 /hpf    RBC 10-20 (A) /hpf    Bacteria Negative None /hpf    Epithelial Cells Few /hpf    Hyaline Cast 0-2 /lpf   EKG   Result Value Ref Range    Report       [facility]    Test Date:  2017  Pt Name:    SHAILESH BYRD               Department: ER  MRN:        [id number]                      Room:       [facility]  Gender:     F                            Technician: [id number]  :        [id number]                   Requested By:HELEN DE LA ROSA  Order #:    [id number]                    Reading MD:    Measurements  Intervals                                Axis  Rate:       60                           P:          56  AZ:         188                          QRS:        11  QRSD:       100                          T:          19  QT:         452  QTc:        452    Interpretive Statements  SINUS RHYTHM  PROBABLE LEFT ATRIAL ABNORMALITY  EARLY PRECORDIAL R/S TRANSITION  BASELINE WANDER IN LEAD(S) V2,V5  Compared to ECG 2016 16:57:03  No significant changes         All labs reviewed by me.    RADIOLOGY  DX-CHEST-PORTABLE (1 VIEW)   Final Result      Bibasilar opacities, consistent with atelectasis.        The radiologist's  interpretation of all radiological studies have been reviewed by me.    COURSE & MEDICAL DECISION MAKING  Nursing notes, VS, PMSFHx reviewed in chart.    8:35 PM Patient seen and examined at bedside. Patient will be treated withTylenol and fluids. Ordered for blood work, sepsis bundle to evaluate her symptoms.     Patient reevaluated, she reports she is feeling improved, no respiratory distress, will plan for discharge    Vitals:    09/28/17 1737 09/28/17 1744 09/28/17 2004   BP: 118/56  107/59   Pulse: 82  78   Resp: 16  14   Temp: (!) 39.2 °C (102.6 °F)  37.8 °C (100 °F)   SpO2: 91%  96%   Weight:  77.1 kg (169 lb 15.6 oz)          Decision Making:  This is a 82 y.o. female is again with some cough and chills. Symptoms are suggestive of bronchitis. She has no focal pulmonary findings on her exam or x-ray to suggest pneumonia at this time. Additionally she has no evidence of leukocytosis or lactic acidosis to suggest sepsis or systemic involvement and clinically is quite well-appearing. Other than some urinary frequency is no other focal symptoms to suggest other infectious process and she has negative urinalysis.  Will prescribe Azithro given her age and chills with her bronchitis.  She does have close primary care follow-up and have discussed strict return precautions and the importance of follow-up with her which she understands well and agrees to    The patient will return for new or worsening symptoms and is stable at the time of discharge.    The patient is referred to a primary physician for blood pressure management, diabetic screening, and for all other preventative health concerns.    DISPOSITION:  Patient will be discharged home in stable condition.    FOLLOW UP:  ELBA Baeza  [address] [id number] [phone]    In 5 days        OUTPATIENT MEDICATIONS:  New Prescriptions    AZITHROMYCIN (ZITHROMAX) 250 MG TAB    Take 2 tabs on day one then 1 tab on day 2-5          FINAL IMPRESSION  1. Bronchitis         The note accurately reflects work and decisions made by me.  William Bustillos  9/28/2017  11:31 PM

## 2017-09-29 NOTE — ED NOTES
Chief Complaint   Patient presents with   • Flu Like Symptoms     pt reports that she has been having fever and weakness today. denies N/V/D      Mask applied in triage. Had a melanoma removed from neck today.  Pt flushed. Returned from cruise ship 3 days ago. Symptoms started on Tuesday night.   Blood pressure 118/56, pulse 82, temperature (!) 39.2 °C (102.6 °F), resp. rate 16, weight 77.1 kg (169 lb 15.6 oz), SpO2 91 %.    Pt informed of wait times. Educated on triage process.  Asked to return to triage RN for any new or worsening of symptoms. Thanked for patience.

## 2017-09-29 NOTE — ED NOTES
Pt ambulated to BR with cane and stand by assist. Instructed on clean catch urine sample. Family remains at bedside.

## 2017-10-01 LAB
BACTERIA UR CULT: NORMAL
SIGNIFICANT IND 70042: NORMAL
SITE SITE: NORMAL
SOURCE SOURCE: NORMAL

## 2017-10-02 ENCOUNTER — OFFICE VISIT (OUTPATIENT)
Dept: MEDICAL GROUP | Facility: PHYSICIAN GROUP | Age: 82
End: 2017-10-02
Payer: MEDICARE

## 2017-10-02 VITALS
OXYGEN SATURATION: 94 % | TEMPERATURE: 97.9 F | DIASTOLIC BLOOD PRESSURE: 66 MMHG | BODY MASS INDEX: 28.53 KG/M2 | SYSTOLIC BLOOD PRESSURE: 108 MMHG | HEIGHT: 63 IN | HEART RATE: 74 BPM | WEIGHT: 161 LBS

## 2017-10-02 DIAGNOSIS — C43.4 MALIGNANT MELANOMA OF NECK (HCC): ICD-10-CM

## 2017-10-02 DIAGNOSIS — M05.79 RHEUMATOID ARTHRITIS INVOLVING MULTIPLE SITES WITH POSITIVE RHEUMATOID FACTOR (HCC): ICD-10-CM

## 2017-10-02 DIAGNOSIS — J40 BRONCHITIS: ICD-10-CM

## 2017-10-02 PROCEDURE — 99214 OFFICE O/P EST MOD 30 MIN: CPT | Performed by: NURSE PRACTITIONER

## 2017-10-02 RX ORDER — BENZONATATE 100 MG/1
100 CAPSULE ORAL 2 TIMES DAILY PRN
Qty: 30 CAP | Refills: 0 | Status: SHIPPED | OUTPATIENT
Start: 2017-10-02 | End: 2017-11-02

## 2017-10-02 ASSESSMENT — PAIN SCALES - GENERAL: PAINLEVEL: 5=MODERATE PAIN

## 2017-10-02 NOTE — ASSESSMENT & PLAN NOTE
Recent cruise and then developed fever, chills, cough, sore throat and congestion. Seen in ER.  Chest xray negative.  Labs ok.  Negative Flu.  Z pack given.  Has not been taking any cough medication, cold medication or ibuprofen.  Has RA and took MTX and humira prior to becoming ill.   No fever, chills today.  Cough continues.  Difficulty sleeping.  No chest pain reported.

## 2017-10-03 LAB
BACTERIA BLD CULT: NORMAL
BACTERIA BLD CULT: NORMAL
SIGNIFICANT IND 70042: NORMAL
SIGNIFICANT IND 70042: NORMAL
SITE SITE: NORMAL
SITE SITE: NORMAL
SOURCE SOURCE: NORMAL
SOURCE SOURCE: NORMAL

## 2017-10-09 ENCOUNTER — TELEPHONE (OUTPATIENT)
Dept: RHEUMATOLOGY | Facility: PHYSICIAN GROUP | Age: 82
End: 2017-10-09

## 2017-10-09 NOTE — TELEPHONE ENCOUNTER
I called and spoke with patient. I told her we could take it and dispose of it accordingly.  Thank you

## 2017-10-16 LAB — EKG IMPRESSION: NORMAL

## 2017-10-18 ENCOUNTER — ANTICOAGULATION MONITORING (OUTPATIENT)
Dept: VASCULAR LAB | Facility: MEDICAL CENTER | Age: 82
End: 2017-10-18

## 2017-10-18 DIAGNOSIS — Z79.01 LONG TERM CURRENT USE OF ANTICOAGULANTS WITH INR GOAL OF 2.0-3.0: ICD-10-CM

## 2017-10-18 DIAGNOSIS — I48.91 ATRIAL FIBRILLATION, UNSPECIFIED TYPE (HCC): ICD-10-CM

## 2017-10-18 LAB — INR PPP: 6.4 (ref 2–3.5)

## 2017-10-18 NOTE — PROGRESS NOTES
Anticoagulation Summary  As of 10/18/2017    INR goal:   2.0-3.0   TTR:   51.5 % (1.6 y)   Today's INR:   6.4!   Maintenance plan:   2.5 mg (2.5 mg x 1) on Tue, Sat; 5 mg (2.5 mg x 2) all other days   Weekly total:   30 mg   Plan last modified:   Toney Spicer, PharmD (9/27/2017)   Next INR check:   10/20/2017   Target end date:       Indications    Long term current use of anticoagulants with INR goal of 2.0-3.0 [Z79.01]  Deep vein thrombosis (CMS-HCC) [I82.409]  Atrial fibrillation [I48.91] [I48.91]             Anticoagulation Episode Summary     INR check location:   Home Draw    Preferred lab:       Send INR reminders to:       Comments:         Anticoagulation Care Providers     Provider Role Specialty Phone number    MeganELBA Neal Referring Family Medicine [phone]    Jasmyne Macdonald, PharmD Responsible          Anticoagulation Patient Findings  Patient Findings     Positives:   Change in health, Change in medications    Negatives:   Signs/symptoms of thrombosis, Signs/symptoms of bleeding, Laboratory test error suspected, Change in alcohol use, Change in activity, Upcoming invasive procedure, Emergency department visit, Upcoming dental procedure, Missed doses, Extra doses, Change in diet/appetite, Hospital admission, Bruising, Other complaints    Comments:   D/c'd humira  Diagnosed with melanoma         Spoke with patient today regarding supratherapeutic INR of 6.4.  Patient denies any signs/symptoms of bruising or bleeding or any changes in diet and medications.  Instructed patient to call clinic with any questions or concerns.  Patient denies changes in appetite, cranberries/juice, or decrease in VitK intake.  She had bronchitis couple weeks back and took z-pack, no steroids.  Instructed her to HOLD X 2, then recheck INR.  Follow up in 2 days.    Steve Hahn, PharmD

## 2017-10-19 ENCOUNTER — OFFICE VISIT (OUTPATIENT)
Dept: RHEUMATOLOGY | Facility: PHYSICIAN GROUP | Age: 82
End: 2017-10-19
Payer: MEDICARE

## 2017-10-19 VITALS
SYSTOLIC BLOOD PRESSURE: 120 MMHG | DIASTOLIC BLOOD PRESSURE: 76 MMHG | OXYGEN SATURATION: 97 % | TEMPERATURE: 99.5 F | HEART RATE: 72 BPM | BODY MASS INDEX: 28.87 KG/M2 | RESPIRATION RATE: 16 BRPM | WEIGHT: 163 LBS

## 2017-10-19 DIAGNOSIS — I48.91 ATRIAL FIBRILLATION, UNSPECIFIED TYPE (HCC): ICD-10-CM

## 2017-10-19 DIAGNOSIS — Z79.01 LONG TERM CURRENT USE OF ANTICOAGULANTS WITH INR GOAL OF 2.0-3.0: ICD-10-CM

## 2017-10-19 DIAGNOSIS — I82.5Y9 CHRONIC DEEP VEIN THROMBOSIS (DVT) OF PROXIMAL VEIN OF LOWER EXTREMITY, UNSPECIFIED LATERALITY (HCC): ICD-10-CM

## 2017-10-19 DIAGNOSIS — M05.79 RHEUMATOID ARTHRITIS INVOLVING MULTIPLE SITES WITH POSITIVE RHEUMATOID FACTOR (HCC): ICD-10-CM

## 2017-10-19 DIAGNOSIS — I10 ESSENTIAL HYPERTENSION, BENIGN: ICD-10-CM

## 2017-10-19 DIAGNOSIS — M85.89 OSTEOPENIA OF MULTIPLE SITES: ICD-10-CM

## 2017-10-19 PROCEDURE — 99214 OFFICE O/P EST MOD 30 MIN: CPT | Performed by: INTERNAL MEDICINE

## 2017-10-19 RX ORDER — METHYLPREDNISOLONE 4 MG/1
TABLET ORAL
Qty: 21 TAB | Refills: 0 | Status: SHIPPED | OUTPATIENT
Start: 2017-10-19 | End: 2017-11-02

## 2017-10-19 RX ORDER — HYDROCODONE BITARTRATE AND ACETAMINOPHEN 5; 325 MG/1; MG/1
TABLET ORAL
Qty: 30 TAB | Refills: 0 | Status: SHIPPED | OUTPATIENT
Start: 2017-10-19 | End: 2017-12-04 | Stop reason: SDUPTHER

## 2017-10-20 ENCOUNTER — ANTICOAGULATION MONITORING (OUTPATIENT)
Dept: VASCULAR LAB | Facility: MEDICAL CENTER | Age: 82
End: 2017-10-20

## 2017-10-20 DIAGNOSIS — Z79.01 LONG TERM CURRENT USE OF ANTICOAGULANTS WITH INR GOAL OF 2.0-3.0: ICD-10-CM

## 2017-10-20 LAB — INR PPP: 2 (ref 2–3.5)

## 2017-10-20 NOTE — PROGRESS NOTES
Anticoagulation Summary  As of 10/20/2017    INR goal:   2.0-3.0   TTR:   51.4 % (1.6 y)   Today's INR:   2.0   Maintenance plan:   2.5 mg (2.5 mg x 1) on Tue, Sat; 5 mg (2.5 mg x 2) all other days   Weekly total:   30 mg   Plan last modified:   Toney Spicer, PharmD (9/27/2017)   Next INR check:   10/27/2017   Target end date:       Indications    Long term current use of anticoagulants with INR goal of 2.0-3.0 [Z79.01]  Deep vein thrombosis (CMS-HCC) [I82.409]  Atrial fibrillation [I48.91] [I48.91]             Anticoagulation Episode Summary     INR check location:   Home Draw    Preferred lab:       Send INR reminders to:       Comments:         Anticoagulation Care Providers     Provider Role Specialty Phone number    ELBA Baeza Referring Family Medicine [phone]    Jasmyne Macdonald, PharmD Responsible          Anticoagulation Patient Findings      Left voicemail message to report a therapeutic INR of 2.0.    Pt to continue with current warfarin dosing regimen. Requested pt contact the clinic for any s/s of unusual bleeding, bruising, clotting or any changes to diet or medication.    FU INR in 1 weeks.    Jasmyne Macdonald, PharmD

## 2017-10-20 NOTE — PROGRESS NOTES
Chief Complaint- joint pain    Subjective:   Marry Mathur is a 82 y.o. female here today for follow up of rheumatological issues    Pt here with a dx of RA at Dr Peguero office Patient had been on  MTX 15 mg po qweek and Humira injections 40 mg subcutaneous every 2 weeks but has been found to have a melanoma on her right lateral neck with subsequent resection and now has been off of medications including methotrexate and Humira for some time. Patient now with a lot of swelling in her hands and feels achy all over..  No psoriaisis, positive skin cancer, no iritis/uveitis, no FUO, no weight loss, no recurrent infections, no colitis, no recurrent infections, no new neurological disorders, no fevers of unknown etiology. Pt also diabetic, takes metformin, also with a diagnosis of spinal stenosis with intermittent weakness in her legs with progressive numbness and weakness in the left leg. Patient denies any bowel or bladder incontinence. Denies any new rashes, denies any unexplained weight loss, denies any new skin cancers, is followed very closely by her dermatologist regarding skin cancers.         S/p Remicade-stopped because of hx of melanoma about 1996 and has multiple other skin cancers  S/p Orencia-lost efficacy  S/p Humira-stopped because of recurrence of melanoma October 2017  S/p MTX-stopped because of development of skin cancer/melanoma October 2017  S/p NSAIDS-relatively contraindicated as patient is on chronic anticoagulation      Hep B neg 6/2016  Hand X-rays 3/2016  Indicates DJD  Feet x-rays 3/2016  Indicates DJD  DEXA 3/2016 T scores 1.1, -1.1   Echocardiogram 7/2012 [facility]  RF neg 3/2014 [facility]; RF neg 6/2014 [facility]; RF neg 6/2016  CCP neg 3/2014 [facility]; CCP neg 6/2014 [facility]; CCP neg 6/2016  Uric acid 4.7 3/2014 [facility];Uric acid 4.5 6/2016  Quantiferon Gold neg 6/2014 [facility]; Quantiferon Gold neg 6/2016  Hand x-rays 3/2016-indicate osteoarthritis  Feet  x-rays-indicate osteoarthritis      Current medicines (including changes today)  Current Outpatient Prescriptions   Medication Sig Dispense Refill   • Tofacitinib Citrate 5 MG Tab Take 5 mg by mouth 2 Times a Day. 180 Tab 1   • hydrocodone-acetaminophen (NORCO) 5-325 MG Tab per tablet 1 tab po bid for severe joint pain, NO ALCOHOL , NO DRIVING and NO MARIJUANA  within 24 hrs of taking this medication, NO BENZODIAZEPINE medications, no selling or giving to any other persons 30 Tab 0   • methylPREDNISolone (MEDROL) 4 MG Tab 6 tabs po one day then 5 tabs po one day then 4 tabs po one day then 3 tabs po one day then 2 tabs po one day then 1 tab po for one day 21 Tab 0   • warfarin (COUMADIN) 2.5 MG Tab Take 1 to 2 tablets by mouth daily as directed by the coumadin clinic 180 Tab 1   • valsartan-hydrochlorothiazide (DIOVAN HCT) 80-12.5 MG per tablet Take 0.5 Tabs by mouth every day. 45 Tab 1   • atorvastatin (LIPITOR) 20 MG Tab Take 1 Tab by mouth every day. 90 Tab 1   • omeprazole (PRILOSEC) 20 MG delayed-release capsule Take 1 Cap by mouth every day. 90 Cap 3   • metformin (GLUCOPHAGE) 500 MG Tab Take 1 Tab by mouth every day. 90 Tab 3   • pregabalin (LYRICA) 75 MG Cap Take 1 Cap by mouth 2 times a day. 180 Cap 1   • acetaminophen (TYLENOL) 325 MG Tab Take 650 mg by mouth at bedtime as needed.     • vitamin D (CHOLECALCIFEROL) 1000 UNIT Tab Take 1,000 Units by mouth every day.     • POTASSIUM GLUCONATE Take 1 Tab by mouth every day.     • magnesium oxide (MAG-OX) 400 MG TABS Take 400 mg by mouth 2 times a day.     • Calcium Carbonate-Vitamin D (CALCIUM + D PO) Take 1 Tab by mouth every day.     • benzonatate (TESSALON) 100 MG Cap Take 1 Cap by mouth 2 times a day as needed for Cough. 30 Cap 0   • Psyllium (METAMUCIL FIBER PO) Take  by mouth.     • Wheat Dextrin (BENEFIBER DRINK MIX PO) Take  by mouth.     • methotrexate 2.5 MG Tab 6 tabs po q wednesday 72 Tab 0   • Adalimumab 40 MG/0.8ML Prefilled Syringe Kit Inject  0.8 mL as instructed every 14 days. 6 Each 1   • tizanidine (ZANAFLEX) 2 MG capsule 1-2 tabs po qhs for muscle relaxer 60 Cap 2   • lidocaine (LIDODERM) 5 % Patch Apply 1 Patch to skin as directed every 24 hours. 90 Patch 1   • folic acid (FOLVITE) 400 MCG tablet Take 400 mcg by mouth every day.       No current facility-administered medications for this visit.      She  has a past medical history of Blood clotting disorder (CMS-HCC) (2012); Cancer (CMS-HCC); Chronic back pain greater than 3 months duration; Hiatus hernia syndrome; Hyperlipidemia; Hypertension; Prediabetes; and Rheumatoid arthritis with rheumatoid factor (CMS-HCC).    ROS   Other than what is mentioned in HPI or physical exam, there is no history of headaches, double vision or blurred vision. No temporal tenderness or jaw claudication. No history of cataracts or glaucoma. No trouble swallowing difficulties or sore throats. No history of thyroid disease. No chest complaints including chest pain, cough or sputum production. No shortness of breath. No GI complaints including nausea, vomiting, change in bowel habits, or past peptic ulcer disease. No history of blood in the stools. No urinary complaints including dysuria or frequency. No history of rash including psoriasis. No history of alopecia, photosensitivity, oral ulcerations, Raynaud's phenomena, or swollen joints. No history of gout. No back complaints. No history of low blood counts.       Objective:     Blood pressure 120/76, pulse 72, temperature 37.5 °C (99.5 °F), resp. rate 16, weight 73.9 kg (163 lb), SpO2 97 %. Body mass index is 28.87 kg/m².   Physical Exam:  Constitutional: Alert and oriented X3,.Skin: Warm, dry, good turgor, no rashes in visible areas, multiple areas of resection of skin cancersIncluding right lateral neck but healing well. Eye: Equal, round and reactive, conjunctiva clear, lids normal EOM intact , mild arcus senilis bilaterallyENMT: Lips without lesions, good  dentition, no oropharyngeal ulcers, moist buccal mucosa, pinna without deformityNeck: Trachea midline, no masses, no thyromegaly.Lymph:  No cervical lymphadenopathy, no axillary lymphadenopathy, no supraclavicular lymphadenopathyRespiratory: Unlabored respiratory effort, lungs clear to auscultation, no wheezes, no ronchi.Cardiovascular: Normal S1, S2, patient has a 3/6 systolic ejection murmur heard both at the right and left sternal borders, no edema.Abdomen: Soft, non-tender, no masses, no hepatosplenomegaly.Psych: Alert and oriented x3, normal affect and mood.Neuro: Cranial nerves 2-12 are grossly intact, no loss of sensation LEExt:no joint laxity noted in bilateral arms, no joint laxity noted in bilateral legs, gait without antalgia and without foot drop, there is diffuse swelling of all digits of the hands sausagelike digits also some mild effusions bilateral wrists, no flexure contractures of the elbows, tenderness to palpation bilateral shoulders but with full range of motion. , no christin swan-neck or boutonniere deformities, no ulnar deviation, no flexure contractures in the elbows, knees with crepitus bilaterally but no effusions, no Achilles tendon inflammation.  Lab Results   Component Value Date/Time    QNTTBGOLD Negative 06/29/2016 02:03 PM     Lab Results   Component Value Date/Time    HEPBCORIGM Negative 06/29/2016 02:03 PM    HEPBSAG Negative 06/29/2016 02:03 PM     Lab Results   Component Value Date/Time    SODIUM 128 (L) 09/28/2017 09:06 PM    POTASSIUM 3.6 09/28/2017 09:06 PM    CHLORIDE 98 09/28/2017 09:06 PM    CO2 23 09/28/2017 09:06 PM    GLUCOSE 107 (H) 09/28/2017 09:06 PM    BUN 17 09/28/2017 09:06 PM    CREATININE 0.62 09/28/2017 09:06 PM      Lab Results   Component Value Date/Time    WBC 4.7 (L) 09/28/2017 09:06 PM    RBC 3.86 (L) 09/28/2017 09:06 PM    HEMOGLOBIN 12.5 09/28/2017 09:06 PM    HEMATOCRIT 36.3 (L) 09/28/2017 09:06 PM    MCV 94.0 09/28/2017 09:06 PM    MCH 32.4 09/28/2017  09:06 PM    MCHC 34.4 09/28/2017 09:06 PM    MPV 11.3 09/28/2017 09:06 PM    NEUTSPOLYS 67.80 09/28/2017 09:06 PM    LYMPHOCYTES 15.70 (L) 09/28/2017 09:06 PM    MONOCYTES 15.90 (H) 09/28/2017 09:06 PM    EOSINOPHILS 0.00 09/28/2017 09:06 PM    BASOPHILS 0.40 09/28/2017 09:06 PM      Lab Results   Component Value Date/Time    CALCIUM 8.2 (L) 09/28/2017 09:06 PM    ASTSGOT 32 09/28/2017 09:06 PM    ALTSGPT 42 09/28/2017 09:06 PM    ALKPHOSPHAT 39 09/28/2017 09:06 PM    TBILIRUBIN 0.8 09/28/2017 09:06 PM    ALBUMIN 3.5 09/28/2017 09:06 PM    TOTPROTEIN 6.1 09/28/2017 09:06 PM     Lab Results   Component Value Date/Time    URICACID 4.5 06/29/2016 02:03 PM    RHEUMFACTN <10 06/29/2016 02:03 PM    CCPANTIBODY 4 06/29/2016 02:03 PM     Lab Results   Component Value Date/Time    SEDRATEWES 17 08/07/2017 08:45 AM     Lab Results   Component Value Date/Time    HBA1C 5.5 05/16/2016 07:50 AM     Lab Results   Component Value Date/Time    CPKTOTAL 61 06/29/2016 02:03 PM     Results for orders placed during the hospital encounter of 03/18/16   DX-JOINT SURVEY-HANDS SINGLE VIEW    Impression Multiple bilateral sites of osteoarthritis     Results for orders placed during the hospital encounter of 03/18/16   DX-JOINT SURVEY-FEET SINGLE VIEW    Impression 1.  Bilateral midfoot and forefoot osteoarthritis    2.  Bilateral bunion    3.  Prior fusion across the right 2nd proximal interphalangeal joint    4.  Foreign body or opaque material between 1st and 2nd phalanges     Results for orders placed during the hospital encounter of 03/18/16   DS-BONE DENSITY STUDY (DEXA)    Impression According to the World Health Organization classification, bone mineral density of this patient is osteopenia with increased risk of fracture.        10-year Probability of Fracture:  Major Osteoporotic     14.3%  Hip     3.7%  Population      [place] ()    Based on left femur neck BMD          INTERPRETING LOCATION:  [address] [place] [address], [id number]      Results for orders placed during the hospital encounter of 01/14/09   DX-KNEE COMPLETE 4+    Impression IMPRESSION:     MILD PATELLOFEMORAL AND MEDIAL FEMOROTIBIAL COMPARTMENT DEGENERATIVE   OSTEOARTHROSIS.        GEK:dana     Read By ALEX WISE MD on Jan 14 2009 10:01AM  : DANA Transcription Date: Jovi 15 2009  8:11AM  THIS DOCUMENT HAS BEEN ELECTRONICALLY SIGNED BY: ALEX WISE MD on   Jan 16 2009  1:32PM        Results for orders placed during the hospital encounter of 01/14/09   DX-SHOULDER 2+    Impression IMPRESSION:     NORMAL RADIOGRAPHS OF THE LEFT SHOULDER WITH NOTE MADE OF MINIMAL   DEGENERATIVE OSTEOARTHROSIS OF THE GLENOHUMERAL JOINT WITH MINIMAL   SPURRING.           Results for orders placed during the hospital encounter of 05/12/17   MR-LUMBAR SPINE-W/O    Impression 1.  Mild spinal stenosis at L3-4    2.  Multilevel foraminal stenoses describing the findings section    3.  Degenerative subluxations at T12-L1, L1-2, and L4-5    4.  Mild edematous endplate change adjacent to T12-L1    5.  Incidental hemangioma within the left side of L4    6.  8 mm focus of marrow placement within L2. Differential diagnosis includes atypical hemangioma or a metastasis. Significance of this finding depends almost exclusively on whether there is a history of known malignancy.     Results for orders placed during the hospital encounter of 01/14/09   DX-CERVICAL SPINE-2 OR 3 VIEWS    Impression IMPRESSION:     DEGENERATIVE DISC AND FACET ARTHROPATHY C5-6, C6-7, AND DEGENERATIVE   FACET ARTHROPATHY AT C7-T1.           Assessment and Plan:     1. Rheumatoid arthritis involving multiple sites with positive rheumatoid factor (CMS-HCC)  TNF inhibitors and methotrexate relatively contraindicated in this patient with 2 bouts of melanoma now, we will do a trial of XELJANZ 5 mg by mouth twice a day, and we'll do a Medrol Dosepak to help as a bridging medication. We will refill [place] to be used when  necessary  - Tofacitinib Citrate 5 MG Tab; Take 5 mg by mouth 2 Times a Day.  Dispense: 180 Tab; Refill: 1  - hydrocodone-acetaminophen (NORCO) 5-325 MG Tab per tablet; 1 tab po bid for severe joint pain, NO ALCOHOL , NO DRIVING and NO MARIJUANA  within 24 hrs of taking this medication, NO BENZODIAZEPINE medications, no selling or giving to any other persons  Dispense: 30 Tab; Refill: 0  - methylPREDNISolone (MEDROL) 4 MG Tab; 6 tabs po one day then 5 tabs po one day then 4 tabs po one day then 3 tabs po one day then 2 tabs po one day then 1 tab po for one day  Dispense: 21 Tab; Refill: 0    2. Osteopenia of multiple sites  Last DEXA March 2016, next DEXA March 2018   continue calcium 1200 mg by mouth daily and vitamin D about 1000 units by mouth daily and magnesium 400 mg by mouth daily  - hydrocodone-acetaminophen (NORCO) 5-325 MG Tab per tablet; 1 tab po bid for severe joint pain, NO ALCOHOL , NO DRIVING and NO MARIJUANA  within 24 hrs of taking this medication, NO BENZODIAZEPINE medications, no selling or giving to any other persons  Dispense: 30 Tab; Refill: 0    3. Essential hypertension, benign  May impact the type of medications we can use for this patient's arthritis. We will have to keep this under advisement.    4. Atrial fibrillation, unspecified type (CMS-HCC)  On chronic anticoagulation Coumadin    5. Long term current use of anticoagulants with INR goal of 2.0-3.0  On chronic anticoagulation Coumadin, patient's gonna be checking with her new primary care doctor about possibly switching Coumadin to an alternative anticoagulant  This will impact with kind of medications we can use for this patient's arthritis, NSAIDs are contraindicated because of increased risk of bleeding while on chronic anticoagulation  - hydrocodone-acetaminophen (NORCO) 5-325 MG Tab per tablet; 1 tab po bid for severe joint pain, NO ALCOHOL , NO DRIVING and NO MARIJUANA  within 24 hrs of taking this medication, NO BENZODIAZEPINE  medications, no selling or giving to any other persons  Dispense: 30 Tab; Refill: 0  - methylPREDNISolone (MEDROL) 4 MG Tab; 6 tabs po one day then 5 tabs po one day then 4 tabs po one day then 3 tabs po one day then 2 tabs po one day then 1 tab po for one day  Dispense: 21 Tab; Refill: 0    6. Chronic deep vein thrombosis (DVT) of proximal vein of lower extremity, unspecified laterality (CMS-HCC)  On chronic anticoagulation Coumadin  - methylPREDNISolone (MEDROL) 4 MG Tab; 6 tabs po one day then 5 tabs po one day then 4 tabs po one day then 3 tabs po one day then 2 tabs po one day then 1 tab po for one day  Dispense: 21 Tab; Refill: 0    Followup: Return in about 2 months (around 12/19/2017). or sooner prn    Patient was seen 30 minutes face-to-face of which more than 50% of the time was spent counseling the patient (excluding time for procedures)  regarding  rheumatological condition and care. Therapy was discussed in detail.    Please note that this dictation was created using voice recognition software. I have made every reasonable attempt to correct obvious errors, but I expect that there are errors of grammar and possibly content that I did not discover before finalizing the note.

## 2017-10-26 ENCOUNTER — APPOINTMENT (RX ONLY)
Dept: URBAN - METROPOLITAN AREA CLINIC 36 | Facility: CLINIC | Age: 82
Setting detail: DERMATOLOGY
End: 2017-10-26

## 2017-10-26 DIAGNOSIS — Z48.817 ENCOUNTER FOR SURGICAL AFTERCARE FOLLOWING SURGERY ON THE SKIN AND SUBCUTANEOUS TISSUE: ICD-10-CM

## 2017-10-26 PROCEDURE — ? POST-OP WOUND EVALUATION

## 2017-10-26 PROCEDURE — 99024 POSTOP FOLLOW-UP VISIT: CPT

## 2017-10-26 ASSESSMENT — LOCATION ZONE DERM: LOCATION ZONE: NECK

## 2017-10-26 ASSESSMENT — LOCATION SIMPLE DESCRIPTION DERM: LOCATION SIMPLE: NECK

## 2017-10-26 ASSESSMENT — LOCATION DETAILED DESCRIPTION DERM: LOCATION DETAILED: RIGHT CENTRAL LATERAL NECK

## 2017-10-26 NOTE — PROCEDURE: POST-OP WOUND EVALUATION
Wound Evaluated By (Optional): Jani Gupta M.D.
Sutures?: intact
Detail Level: Detailed
Wound Diameter In Cm(Optional): 0
Add 14311 Cpt? (Important Note: In 2017 The Use Of 89170 Is Being Tracked By Cms To Determine Future Global Period Reimbursement For Global Periods): yes
Wound Color?: pink
Wound Crusting?: clean

## 2017-10-27 LAB — INR PPP: 2.8 (ref 2–3.5)

## 2017-10-30 ENCOUNTER — ANTICOAGULATION MONITORING (OUTPATIENT)
Dept: VASCULAR LAB | Facility: MEDICAL CENTER | Age: 82
End: 2017-10-30

## 2017-10-30 DIAGNOSIS — Z79.01 LONG TERM CURRENT USE OF ANTICOAGULANTS WITH INR GOAL OF 2.0-3.0: ICD-10-CM

## 2017-10-30 NOTE — PROGRESS NOTES
Anticoagulation Summary  As of 10/30/2017    INR goal:   2.0-3.0   TTR:   51.9 % (1.6 y)   Today's INR:   2.8 (10/27/2017)   Maintenance plan:   2.5 mg (2.5 mg x 1) on Tue, Sat; 5 mg (2.5 mg x 2) all other days   Weekly total:   30 mg   Plan last modified:   Toney Spicer, PharmD (9/27/2017)   Next INR check:   11/10/2017   Target end date:       Indications    Long term current use of anticoagulants with INR goal of 2.0-3.0 [Z79.01]  Deep vein thrombosis (CMS-HCC) [I82.409]  Atrial fibrillation [I48.91] [I48.91]             Anticoagulation Episode Summary     INR check location:   Home Draw    Preferred lab:       Send INR reminders to:       Comments:         Anticoagulation Care Providers     Provider Role Specialty Phone number    MeagnELBA Neal Referring [facility] [phone]    Jasmyne Macdonald, PharmD Responsible          Anticoagulation Patient Findings    Spoke with patient to report a therapeutic INR.    Pt instructed to continue with current warfarin dosing regimen, confirms dosing.   Pt denies any s/s of bleeding, bruising, clotting or any changes to diet or medication.    Will follow up in 2 weeks.     Jasmyne Macdonald, PharmD

## 2017-11-02 ENCOUNTER — OFFICE VISIT (OUTPATIENT)
Dept: MEDICAL GROUP | Facility: PHYSICIAN GROUP | Age: 82
End: 2017-11-02
Payer: MEDICARE

## 2017-11-02 VITALS
HEIGHT: 63 IN | BODY MASS INDEX: 29.06 KG/M2 | HEART RATE: 86 BPM | SYSTOLIC BLOOD PRESSURE: 120 MMHG | OXYGEN SATURATION: 98 % | DIASTOLIC BLOOD PRESSURE: 80 MMHG | TEMPERATURE: 99.6 F | WEIGHT: 164 LBS | RESPIRATION RATE: 16 BRPM

## 2017-11-02 DIAGNOSIS — I35.0 MILD AORTIC STENOSIS: ICD-10-CM

## 2017-11-02 DIAGNOSIS — Z23 NEED FOR 23-POLYVALENT PNEUMOCOCCAL POLYSACCHARIDE VACCINE: ICD-10-CM

## 2017-11-02 DIAGNOSIS — D68.59 PROTEIN S DEFICIENCY (HCC): ICD-10-CM

## 2017-11-02 DIAGNOSIS — Z23 NEED FOR INFLUENZA VACCINATION: ICD-10-CM

## 2017-11-02 DIAGNOSIS — Z12.39 SCREENING FOR BREAST CANCER: ICD-10-CM

## 2017-11-02 DIAGNOSIS — R19.7 DIARRHEA, UNSPECIFIED TYPE: ICD-10-CM

## 2017-11-02 DIAGNOSIS — H61.21 IMPACTED CERUMEN OF RIGHT EAR: ICD-10-CM

## 2017-11-02 DIAGNOSIS — R30.0 DYSURIA: ICD-10-CM

## 2017-11-02 DIAGNOSIS — M54.50 CHRONIC MIDLINE LOW BACK PAIN WITHOUT SCIATICA: ICD-10-CM

## 2017-11-02 DIAGNOSIS — K21.9 GASTROESOPHAGEAL REFLUX DISEASE, ESOPHAGITIS PRESENCE NOT SPECIFIED: ICD-10-CM

## 2017-11-02 DIAGNOSIS — I10 ESSENTIAL HYPERTENSION, BENIGN: ICD-10-CM

## 2017-11-02 DIAGNOSIS — R94.31 NONSPECIFIC ABNORMAL ELECTROCARDIOGRAM (ECG) (EKG): ICD-10-CM

## 2017-11-02 DIAGNOSIS — E78.5 HYPERLIPIDEMIA, UNSPECIFIED HYPERLIPIDEMIA TYPE: ICD-10-CM

## 2017-11-02 DIAGNOSIS — G89.29 CHRONIC MIDLINE LOW BACK PAIN WITHOUT SCIATICA: ICD-10-CM

## 2017-11-02 DIAGNOSIS — I82.402 DEEP VEIN THROMBOSIS (DVT) OF LEFT LOWER EXTREMITY, UNSPECIFIED CHRONICITY, UNSPECIFIED VEIN (HCC): ICD-10-CM

## 2017-11-02 DIAGNOSIS — M05.79 RHEUMATOID ARTHRITIS INVOLVING MULTIPLE SITES WITH POSITIVE RHEUMATOID FACTOR (HCC): ICD-10-CM

## 2017-11-02 DIAGNOSIS — R32 URINARY INCONTINENCE, UNSPECIFIED TYPE: ICD-10-CM

## 2017-11-02 DIAGNOSIS — R73.9 HYPERGLYCEMIA: ICD-10-CM

## 2017-11-02 DIAGNOSIS — I45.10 RIGHT BUNDLE BRANCH BLOCK: ICD-10-CM

## 2017-11-02 DIAGNOSIS — R73.01 IMPAIRED FASTING GLUCOSE: ICD-10-CM

## 2017-11-02 PROBLEM — J40 BRONCHITIS: Status: RESOLVED | Noted: 2017-10-02 | Resolved: 2017-11-02

## 2017-11-02 PROCEDURE — 69210 REMOVE IMPACTED EAR WAX UNI: CPT | Mod: 59 | Performed by: INTERNAL MEDICINE

## 2017-11-02 PROCEDURE — G0008 ADMIN INFLUENZA VIRUS VAC: HCPCS | Performed by: INTERNAL MEDICINE

## 2017-11-02 PROCEDURE — 90662 IIV NO PRSV INCREASED AG IM: CPT | Performed by: INTERNAL MEDICINE

## 2017-11-02 PROCEDURE — 99204 OFFICE O/P NEW MOD 45 MIN: CPT | Mod: 25 | Performed by: INTERNAL MEDICINE

## 2017-11-02 PROCEDURE — 90732 PPSV23 VACC 2 YRS+ SUBQ/IM: CPT | Performed by: INTERNAL MEDICINE

## 2017-11-02 PROCEDURE — G0009 ADMIN PNEUMOCOCCAL VACCINE: HCPCS | Performed by: INTERNAL MEDICINE

## 2017-11-02 RX ORDER — OMEPRAZOLE 20 MG/1
20 CAPSULE, DELAYED RELEASE ORAL DAILY
Qty: 90 CAP | Refills: 3
Start: 2017-11-02 | End: 2018-12-04

## 2017-11-02 RX ORDER — ATORVASTATIN CALCIUM 10 MG/1
10 TABLET, FILM COATED ORAL DAILY
Qty: 90 TAB | Refills: 3 | Status: SHIPPED | OUTPATIENT
Start: 2017-11-02 | End: 2017-12-28

## 2017-11-02 NOTE — ASSESSMENT & PLAN NOTE
Had an upper endoscopy from Dr. Sherman and was told she had some scarring but she thinks that's related to a traumatic intubation during surgery in the past. She doesn't currently have any reflux symptoms but remains on omeprazole.

## 2017-11-02 NOTE — PATIENT INSTRUCTIONS
· Try docusate (over the counter stool softener)   · Make sure you review your new medication list  · Xarelto is a blood thinner that doesn't require INR monitoring. You take 15 mg tablets of this twice a day for 21 days and then switch to 20 mg once daily     Tips for Sleep:   A) Goal: Obtain a minimum of 7-8hours of continuous, uninterrupted, restful sleep per night.   B) Tips for Sleep Hygiene:   I) Go to bed and wake up at consistent times whether work/school day or not.    II) Keep room dark, quiet, and comfortable.  Increase exposure to sunlight during awake times and avoid bright lights (especially anything with a backlight) at least the last 1-2hours before going to sleep.    III) Don't nap.    IV) Avoid stimulant or caffeine use more than 4 hours after wake time.

## 2017-11-02 NOTE — PROGRESS NOTES
[facility] NEW PATIENT H&P  Chief Complaint   Patient presents with   • Coagulation Disorder       History of Present Illness     Urinary incontinence  Has been having long standing issues with urinary incontinence. Not always related to coughing, sneezing. Sometimes can't make it to the restroom in time before she urinates. Wears a pad constantly. Lately has also been having dysuria because she thinks she was sitting in a bowel movement for at least 1.5 hours about a week ago.     Dysuria  Was incontinent of stool about a week ago and thinks she may have gotten a UTI from sitting in it. Denies fevers, chills, hematuria.     Back pain  Says she has several etiologies for her back pain and isn't a surgical candidate. Has a regular physical therapy maintenance program which she goes in for that helps. Requires 1/2 pill of norco 5 qHS. Started seeing a PM&R doctor who wanted to try other modalities but she was having a rheumatoid arthritis flare so those interventions were postponed. She is hopeful that as her RA stabilizes on Xeljanz that she will again be a candidate for these therapies. She plans to re-enroll in the physical therapy program to also help with her back pain.     Rheumatoid arthritis involving multiple sites with positive rheumatoid factor (CMS-HCC)  Was just recently switched from Humira and MTX to Xeljanz and is still having arthritis pain.     Essential hypertension, benign  Isn't sure if she still needs to be on Diovan since oftentimes her blood pressures are even lower than 120/80. She is interested in tapering off of medications.     GERD (gastroesophageal reflux disease)  Had an upper endoscopy from Dr. Sherman and was told she had some scarring but she thinks that's related to a traumatic intubation during surgery in the past. She doesn't currently have any reflux symptoms but remains on omeprazole.     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 Has been on warfarin since hospitalization 2012 but doesn't want the frequent INR checks anymore and is interested in a NOAC. She was told several years ago by a hematologist that at that time there wasn't enough evidence to use NOACs for this indication.     Diarrhea  Has been having issues recently with both alternating constipation and diarrhea. She is on pain medications that are constipating (norco 5s). When she has hard stools she takes a teaspoon or two of Miralax but this results in runny bowel movements she cannot control. Has intact sensation of her bottom. About a week ago she was on a bus back from [place] and didn't realize she had leaked some stool and thinks she may have gotten a UTI from that. Otherwise she is fiber conscious and makes her own morning muffins and tries to eat lots of vegetables. Is also fairly active. Wary of drinking lots of water because of her issues with urinary incontinence.       Current Outpatient Prescriptions   Medication Sig Dispense Refill   • omeprazole (PRILOSEC) 20 MG delayed-release capsule Take 1 Cap by mouth every day. 90 Cap 3   • atorvastatin (LIPITOR) 10 MG Tab Take 1 Tab by mouth every day. 90 Tab 3   • rivaroxaban (XARELTO) 15 MG Tab tablet Take 1 Tab by mouth 2 Times a Day. 42 Tab 0   • rivaroxaban (XARELTO) 20 MG Tab tablet Take 1 Tab by mouth with dinner. 90 Tab 3   • warfarin (COUMADIN) 2.5 MG Tab Take 1 to 2 tablets by mouth daily as directed by the coumadin clinic 180 Tab 1   • Psyllium (METAMUCIL FIBER PO) Take  by mouth.     • Wheat Dextrin (BENEFIBER DRINK MIX PO) Take  by mouth.     • acetaminophen (TYLENOL) 325 MG Tab Take 650 mg by mouth at bedtime as needed.     • folic acid (FOLVITE) 400 MCG tablet Take 400 mcg by mouth every day.     • vitamin D (CHOLECALCIFEROL) 1000 UNIT Tab Take 1,000 Units by mouth every day.     • POTASSIUM GLUCONATE Take 1 Tab  by mouth every day.     • Calcium Carbonate-Vitamin D (CALCIUM + D PO) Take 1 Tab by mouth every day.     • Tofacitinib Citrate 5 MG Tab Take 5 mg by mouth 2 Times a Day. 180 Tab 1   • hydrocodone-acetaminophen (NORCO) 5-325 MG Tab per tablet 1 tab po bid for severe joint pain, NO ALCOHOL , NO DRIVING and NO MARIJUANA  within 24 hrs of taking this medication, NO BENZODIAZEPINE medications, no selling or giving to any other persons 30 Tab 0   • lidocaine (LIDODERM) 5 % Patch Apply 1 Patch to skin as directed every 24 hours. 90 Patch 1     No current facility-administered medications for this visit.        Past Medical History:   Diagnosis Date   • Atrial fibrillation [I48.91] 4/19/2016   • Back pain 6/27/2012   • Blood clotting disorder (CMS-HCC) 2012    clot in leg   • Cancer (CMS-HCC)     skin   • Chronic back pain greater than 3 months duration    • Deep vein thrombosis (CMS-HCC) 3/8/2016    First occurrence in LLE in late 1970s Second occurrence further up in LLE in 2012, has been on AC since    • Essential hypertension, benign 6/27/2012   • GERD (gastroesophageal reflux disease) 6/27/2012   • Hiatus hernia syndrome    • Hyperlipidemia    • Hypertension    • Impaired fasting glucose 11/2/2017   • Mild aortic stenosis 11/2/2017   • Other and unspecified hyperlipidemia 6/27/2012   • Prediabetes    • Protein S deficiency (CMS-HCC) 11/2/2017    Noted in lab work 2013 as a part of work up at [facility]    • Rheumatoid arthritis involving multiple sites with positive rheumatoid factor (CMS-HCC) 03/14/2016    Dr. Escoto   • Rheumatoid nodule (CMS-HCC) 7/25/2017   • Right bundle branch block 6/27/2012   • Urinary incontinence 11/2/2017     Past Surgical History:   Procedure Laterality Date   • INGUINAL HERNIA REPAIR Right 9/23/2016    Procedure: INGUINAL HERNIA REPAIR - PRIMARY;  Surgeon: Mary Medina M.D.;  Location: SURGERY [facility];  Service:    • OTHER ORTHOPEDIC SURGERY  2014    left knee partial   •  "OTHER ORTHOPEDIC SURGERY      meniscus repair   • HYSTERECTOMY, TOTAL ABDOMINAL     • ATHROPLASTY      partial TKA with Dr. Persaud   • CHOLECYSTECTOMY       Social History   Substance Use Topics   • Smoking status: Never Smoker   • Smokeless tobacco: Never Used   • Alcohol use No     Social History     Social History Narrative    Retired from [employer]. 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   HEENT: Negative for vision changes, hearing changes    Respiratory: Negative for shortness of breath  Cardiovascular: Negative for chest pain, palpitations  Gastrointestinal: Negative for blood in stool. Positive for alternating constipation and diarrhea   Genitourinary: Positive for dysuria   Musculoskeletal: Negative for myalgias. Positive for back pain, and joint pain.   Skin: Negative for rash  Neurological: Negative for numbness, tingling  Psychiatric/Behavioral: Negative for depression, suicidal/homicidal ideation      Objective   Blood pressure 120/80, pulse 86, temperature 37.6 °C (99.6 °F), resp. rate 16, height 1.6 m (5' 3\"), weight 74.4 kg (164 lb), SpO2 98 %. Body mass index is 29.05 kg/m².    General: Alert, oriented. In no acute distress   HEET: EOMI, PERRL, conjunctiva non-injected, sclera non-icteric.  Nares patent with no significant congestion or drainage.  Laurel pinnae, external auditory canals, TM pearly gray with normal light reflex left. Cerumen impaction of right. Oral mucous membranes pink and moist with no lesions.  Neck: supple with no cervical, subclavicular lymphadenopathy, " JVD, palpable thyroid nodules   Lungs: clear to auscultation bilaterally with good excursion.  CV: regular rate and rhythm. V/VI systolic ejection murmur   Abdomen soft, non-distended, non-tender with normal bowel sounds. No hepatosplenomegaly, no masses palpated  Skin: no lesions. Warm, dry   Psychiatric: appropriate mood and affect     Assessment and Plan   The following treatment plan was discussed     1. Need for influenza vaccination  Given today.   - INFLUENZA VACCINE, HIGH DOSE (65+ ONLY)    2. Need for 23-polyvalent pneumococcal polysaccharide vaccine  Given today. Has completed Prevnar in the past.   - Pneumococal Polysaccharide Vaccine 23-Valent =>3yo SQ/IM    3. Screening for breast cancer  Did discuss that patient may choose to opt out of mammograms if she chooses. She would like to continue screening.   - MA-SCREEN MAMMO W/CAD-BILAT; Future    4. Deep vein thrombosis (DVT) of left lower extremity, unspecified chronicity, unspecified vein (CMS-HCC)  Has had 2 DVTs both of LLE in the past (initially late 1970s and then more recently 2012) with protein S deficiency although I am not sure if her coagulation work up from 2013 was done off of anticoagulation and not in an acute clotting setting. Nonetheless, she has had 2 unprovoked DVTs and it may be reasonable to continue anticoagulation, especially since she demonstrates good balance and cognition. We will switch from warfarin to xarelto to avoid the INR monitoring with plan to let INR drop < 3 and then start xarelto. Advised patient to definitely not take both warfarin and xarelto together.     5. Protein S deficiency (CMS-HCC)  Plan as above, see DVT.   - rivaroxaban (XARELTO) 15 MG Tab tablet; Take 1 Tab by mouth 2 Times a Day.  Dispense: 42 Tab; Refill: 0  - rivaroxaban (XARELTO) 20 MG Tab tablet; Take 1 Tab by mouth with dinner.  Dispense: 90 Tab; Refill: 3    6. Urinary incontinence, unspecified type  She may have combination stress/urge incontinence  however currently also having dysuria with concern for UTI. Will check UA and culture if positive.   - URINALYSIS; Future    7. Dysuria  Check UA with culture. Concern that she has a UTI from sitting in a loose bowel movement for about 1-2 hours a week ago.     8. Chronic midline low back pain without sciatica  Seeing Dr. Cooper for this, will consider alternative therapies at her next visit with this provider once her arthritis is under better control.     9. Impaired fasting glucose  Fasting glucose 107 as of 9/2017. Will continue to monitor every 6 months. Asked her to come off of metformin 500 mg daily to avoid polypharmacy and will keep a close eye on blood sugars off of this medication.     10. Essential hypertension, benign  Patient is normotensive at 120/80 which per [facility] is tighter control than necessary for her age group. Will discontinue valsartan-HCTZ 80-12.5 (was only taking 1/2 tab) and re-assess at next visit in late December. Would like to avoid polypharmacy in this patient.     11. Rheumatoid arthritis involving multiple sites with positive rheumatoid factor (CMS-HCC)  Recently just started on tofacitinib with her rheumatologist and her arthritis is still acclimating to this regimen change. She follows with rheumatology regularly with next visit 12/4/17.     12. Impacted cerumen of right ear  Procedure: Cerumen Removal  Risks and benefits of procedure discussed  Cerumen removed with curette and lavage after softening agent instilled  Patient tolerated well  Post procedure exam with clear canal and normal TM    13. Hyperlipidemia, unspecified hyperlipidemia type  Last lipid profile was well within goals on lipitor 20 mg. Patient is very conscious of her diet as well and remains fairly active. Will cut dose of lipitor in half from 20 mg to 10 mg daily and recheck her lipids in 6 months. If they remain at goal will consider taking her off of lipitor all together and then rechecking then.   - atorvastatin  (LIPITOR) 10 MG Tab; Take 1 Tab by mouth every day.  Dispense: 90 Tab; Refill: 3    14. Diarrhea, unspecified type  Suspect that patient has constipation related to her polypharmacy and pain medications which with miralax then turns to diarrhea; these loose bowel movements in turn pre-dispose her to UTIs. Suggested she use docusate instead of Miralax and consider increasing Benefiber from once to twice daily. It will be difficult to calibrate her stool consistency. Discontinue magnesium as this may also be contributing. Cut back on antihypertensives and cholesterol medications in an attempt to prevent polypharmacy and constipation.     Return in about 2 months (around 1/2/2018).    Health Maintenance      Health Maintenance Due   Topic Date Due   • Annual Wellness Visit  1935   • MAMMOGRAM  03/18/2017     Eric Cook MD  Internal Medicine  [facility]

## 2017-11-02 NOTE — ASSESSMENT & PLAN NOTE
Has been having long standing issues with urinary incontinence. Not always related to coughing, sneezing. Sometimes can't make it to the restroom in time before she urinates. Wears a pad constantly. Lately has also been having dysuria because she thinks she was sitting in a bowel movement for at least 1.5 hours about a week ago.

## 2017-11-02 NOTE — ASSESSMENT & PLAN NOTE
Has had 2 DVTs both thought to be unprovoked of left lower extremity and had anticoagulation work up in 2013 demonstrating protein S deficiency. Has been on warfarin since hospitalization 2012 but doesn't want the frequent INR checks anymore and is interested in a NOAC. She was told several years ago by a hematologist that at that time there wasn't enough evidence to use NOACs for this indication.

## 2017-11-02 NOTE — ASSESSMENT & PLAN NOTE
Says she has several etiologies for her back pain and isn't a surgical candidate. Has a regular physical therapy maintenance program which she goes in for that helps. Requires 1/2 pill of norco 5 qHS. Started seeing a PM&R doctor who wanted to try other modalities but she was having a rheumatoid arthritis flare so those interventions were postponed. She is hopeful that as her RA stabilizes on Xeljanz that she will again be a candidate for these therapies. She plans to re-enroll in the physical therapy program to also help with her back pain.

## 2017-11-02 NOTE — ASSESSMENT & PLAN NOTE
Has been having issues recently with both alternating constipation and diarrhea. She is on pain medications that are constipating (norco 5s). When she has hard stools she takes a teaspoon or two of Miralax but this results in runny bowel movements she cannot control. Has intact sensation of her bottom. About a week ago she was on a bus back from [place] and didn't realize she had leaked some stool and thinks she may have gotten a UTI from that. Otherwise she is fiber conscious and makes her own morning muffins and tries to eat lots of vegetables. Is also fairly active. Wary of drinking lots of water because of her issues with urinary incontinence.

## 2017-11-02 NOTE — ASSESSMENT & PLAN NOTE
Isn't sure if she still needs to be on Diovan since oftentimes her blood pressures are even lower than 120/80. She is interested in tapering off of medications.

## 2017-11-07 ENCOUNTER — HOSPITAL ENCOUNTER (OUTPATIENT)
Dept: LAB | Facility: MEDICAL CENTER | Age: 82
End: 2017-11-07
Attending: INTERNAL MEDICINE
Payer: MEDICARE

## 2017-11-07 DIAGNOSIS — R30.0 DYSURIA: ICD-10-CM

## 2017-11-07 DIAGNOSIS — R32 URINARY INCONTINENCE, UNSPECIFIED TYPE: ICD-10-CM

## 2017-11-07 LAB
APPEARANCE UR: ABNORMAL
BACTERIA #/AREA URNS HPF: ABNORMAL /HPF
BILIRUB UR QL STRIP.AUTO: NEGATIVE
COLOR UR: ABNORMAL
CULTURE IF INDICATED INDCX: NO UA CULTURE
EPI CELLS #/AREA URNS HPF: ABNORMAL /HPF
GLUCOSE UR STRIP.AUTO-MCNC: NEGATIVE MG/DL
KETONES UR STRIP.AUTO-MCNC: NEGATIVE MG/DL
LEUKOCYTE ESTERASE UR QL STRIP.AUTO: NEGATIVE
MICRO URNS: ABNORMAL
MUCOUS THREADS #/AREA URNS HPF: ABNORMAL /HPF
NITRITE UR QL STRIP.AUTO: NEGATIVE
PH UR STRIP.AUTO: 5.5 [PH]
PROT UR QL STRIP: NEGATIVE MG/DL
RBC # URNS HPF: ABNORMAL /HPF
RBC UR QL AUTO: NEGATIVE
SP GR UR STRIP.AUTO: 1.03
UROBILINOGEN UR STRIP.AUTO-MCNC: 0.2 MG/DL
WBC #/AREA URNS HPF: ABNORMAL /HPF

## 2017-11-07 PROCEDURE — 81001 URINALYSIS AUTO W/SCOPE: CPT

## 2017-11-29 ENCOUNTER — TELEPHONE (OUTPATIENT)
Dept: VASCULAR LAB | Facility: MEDICAL CENTER | Age: 82
End: 2017-11-29

## 2017-11-29 RX ORDER — VALSARTAN AND HYDROCHLOROTHIAZIDE 80; 12.5 MG/1; MG/1
TABLET, FILM COATED ORAL
COMMUNITY
Start: 2017-09-01 | End: 2017-12-28

## 2017-11-30 ENCOUNTER — ANTICOAGULATION MONITORING (OUTPATIENT)
Dept: VASCULAR LAB | Facility: MEDICAL CENTER | Age: 82
End: 2017-11-30

## 2017-11-30 DIAGNOSIS — Z79.01 LONG TERM CURRENT USE OF ANTICOAGULANTS WITH INR GOAL OF 2.0-3.0: ICD-10-CM

## 2017-12-04 ENCOUNTER — OFFICE VISIT (OUTPATIENT)
Dept: RHEUMATOLOGY | Facility: PHYSICIAN GROUP | Age: 82
End: 2017-12-04
Payer: MEDICARE

## 2017-12-04 VITALS
SYSTOLIC BLOOD PRESSURE: 130 MMHG | OXYGEN SATURATION: 96 % | TEMPERATURE: 98.2 F | WEIGHT: 161 LBS | BODY MASS INDEX: 28.52 KG/M2 | HEART RATE: 76 BPM | RESPIRATION RATE: 14 BRPM | DIASTOLIC BLOOD PRESSURE: 80 MMHG

## 2017-12-04 DIAGNOSIS — M54.50 CHRONIC MIDLINE LOW BACK PAIN WITHOUT SCIATICA: ICD-10-CM

## 2017-12-04 DIAGNOSIS — M05.79 RHEUMATOID ARTHRITIS INVOLVING MULTIPLE SITES WITH POSITIVE RHEUMATOID FACTOR (HCC): ICD-10-CM

## 2017-12-04 DIAGNOSIS — I10 ESSENTIAL HYPERTENSION, BENIGN: ICD-10-CM

## 2017-12-04 DIAGNOSIS — G89.29 CHRONIC MIDLINE LOW BACK PAIN WITHOUT SCIATICA: ICD-10-CM

## 2017-12-04 DIAGNOSIS — D68.59 PROTEIN S DEFICIENCY (HCC): ICD-10-CM

## 2017-12-04 DIAGNOSIS — M85.89 OSTEOPENIA OF MULTIPLE SITES: ICD-10-CM

## 2017-12-04 DIAGNOSIS — Z79.01 LONG TERM CURRENT USE OF ANTICOAGULANTS WITH INR GOAL OF 2.0-3.0: ICD-10-CM

## 2017-12-04 DIAGNOSIS — I45.10 RIGHT BUNDLE BRANCH BLOCK: ICD-10-CM

## 2017-12-04 PROCEDURE — 99214 OFFICE O/P EST MOD 30 MIN: CPT | Performed by: INTERNAL MEDICINE

## 2017-12-04 RX ORDER — HYDROCODONE BITARTRATE AND ACETAMINOPHEN 5; 325 MG/1; MG/1
TABLET ORAL
Qty: 30 TAB | Refills: 0 | Status: SHIPPED | OUTPATIENT
Start: 2017-12-04 | End: 2018-04-20

## 2017-12-04 NOTE — PROGRESS NOTES
Chief Complaint- joint pain    Subjective:   Marry Mathur is a 82 y.o. female here today for follow up of rheumatological issues    Pt here with a dx of RA at Dr Peguero office Patient Is currently on XELJANZ 5 mg by mouth twice a day. Patient denies any side effects from the XELJANZ, denies any stomach upset, denies any new rashes, denies any new skin cancers. No psoriaisis, positive skin cancer, no iritis/uveitis, no FUO, no weight loss, no recurrent infections, no colitis, no recurrent infections, no new neurological disorders, no fevers of unknown etiology. Pt also diabetic, takes metformin, also with a diagnosis of spinal stenosis with intermittent weakness in her legs with progressive numbness and weakness in the left leg. Patient has an appointment with her spine doctor tomorrow and is considering asking to restart Lyrica as her low back still giving her some problems. Patient status post trial of muscle relaxer with side effect. Patient denies any bowel or bladder incontinence. Denies any new rashes, denies any unexplained weight loss, denies any new skin cancers, is followed very closely by her dermatologist regarding skin cancers.         S/p Remicade-stopped because of hx of melanoma about 1996 and has multiple other skin cancers  S/p Orencia-lost efficacy  S/p Humira-stopped because of recurrence of melanoma October 2017  S/p MTX-stopped because of development of skin cancer/melanoma October 2017  S/p NSAIDS-relatively contraindicated as patient is on chronic anticoagulation      Hep B neg 6/2016  Hand X-rays 3/2016  Indicates DJD  Feet x-rays 3/2016  Indicates DJD  DEXA 3/2016 T scores 1.1, -1.1   Echocardiogram 7/2012 [facility]  RF neg 3/2014 [facility]; RF neg 6/2014 [facility]; RF neg 6/2016  CCP neg 3/2014 [facility]; CCP neg 6/2014 [facility]; CCP neg 6/2016  Uric acid 4.7 3/2014 [facility];Uric acid 4.5 6/2016  Quantiferon Gold neg 6/2014 [facility]; Quantiferon Gold neg  6/2016  Hand x-rays 3/2016-indicate osteoarthritis  Feet x-rays-indicate osteoarthritis       Current medicines (including changes today)  Current Outpatient Prescriptions   Medication Sig Dispense Refill   • hydrocodone-acetaminophen (NORCO) 5-325 MG Tab per tablet 1 tab po bid for severe joint pain, NO ALCOHOL , NO DRIVING and NO MARIJUANA  within 24 hrs of taking this medication, NO BENZODIAZEPINE medications, no selling or giving to any other persons 30 Tab 0   • valsartan-hydrochlorothiazide (DIOVAN-HCT) 80-12.5 MG per tablet      • atorvastatin (LIPITOR) 10 MG Tab Take 1 Tab by mouth every day. (Patient taking differently: Take 20 mg by mouth every day.) 90 Tab 3   • rivaroxaban (XARELTO) 20 MG Tab tablet Take 1 Tab by mouth with dinner. 90 Tab 3   • Tofacitinib Citrate 5 MG Tab Take 5 mg by mouth 2 Times a Day. 180 Tab 1   • Psyllium (METAMUCIL FIBER PO) Take  by mouth.     • Wheat Dextrin (BENEFIBER DRINK MIX PO) Take  by mouth.     • lidocaine (LIDODERM) 5 % Patch Apply 1 Patch to skin as directed every 24 hours. 90 Patch 1   • acetaminophen (TYLENOL) 325 MG Tab Take 650 mg by mouth at bedtime as needed.     • vitamin D (CHOLECALCIFEROL) 1000 UNIT Tab Take 1,000 Units by mouth every day.     • POTASSIUM GLUCONATE Take 1 Tab by mouth every day.     • Calcium Carbonate-Vitamin D (CALCIUM + D PO) Take 1 Tab by mouth every day.     • omeprazole (PRILOSEC) 20 MG delayed-release capsule Take 1 Cap by mouth every day. 90 Cap 3   • rivaroxaban (XARELTO) 15 MG Tab tablet Take 1 Tab by mouth 2 Times a Day. 42 Tab 0   • warfarin (COUMADIN) 2.5 MG Tab Take 1 to 2 tablets by mouth daily as directed by the coumadin clinic 180 Tab 1   • folic acid (FOLVITE) 400 MCG tablet Take 400 mcg by mouth every day.       No current facility-administered medications for this visit.      She  has a past medical history of Atrial fibrillation [I48.91] (4/19/2016); Back pain (6/27/2012); Blood clotting disorder (CMS-HCC) (2012); Cancer  (CMS-HCC); Chronic back pain greater than 3 months duration; Deep vein thrombosis (CMS-HCC) (3/8/2016); Essential hypertension, benign (6/27/2012); GERD (gastroesophageal reflux disease) (6/27/2012); Hiatus hernia syndrome; Hyperlipidemia; Hypertension; Impaired fasting glucose (11/2/2017); Mild aortic stenosis (11/2/2017); Other and unspecified hyperlipidemia (6/27/2012); Prediabetes; Protein S deficiency (CMS-HCC) (11/2/2017); Rheumatoid arthritis involving multiple sites with positive rheumatoid factor (CMS-HCC) (03/14/2016); Rheumatoid nodule (CMS-HCC) (7/25/2017); Right bundle branch block (6/27/2012); and Urinary incontinence (11/2/2017).    ROS   Other than what is mentioned in HPI or physical exam, there is no history of headaches, double vision or blurred vision. No temporal tenderness or jaw claudication. No history of cataracts or glaucoma. No trouble swallowing difficulties or sore throats. No history of thyroid disease. No chest complaints including chest pain, cough or sputum production. No shortness of breath. No GI complaints including nausea, vomiting, change in bowel habits, or past peptic ulcer disease. No history of blood in the stools. No urinary complaints including dysuria or frequency. No history of rash including psoriasis. No history of alopecia, photosensitivity, oral ulcerations, Raynaud's phenomena, or swollen joints. No history of gout. No back complaints. No history of low blood counts.       Objective:     Blood pressure 130/80, pulse 76, temperature 36.8 °C (98.2 °F), resp. rate 14, weight 73 kg (161 lb), SpO2 96 %. Body mass index is 28.52 kg/m².   Physical Exam:  Constitutional: Alert and oriented X3,.Skin: Warm, dry, good turgor, no rashes in visible areas, multiple areas of resection of skin cancers now  Healed. Eye: Equal, round and reactive, conjunctiva clear, lids normal EOM intact , mild arcus senilis bilaterallyENMT: Lips without lesions, good dentition, no oropharyngeal  ulcers, moist buccal mucosa, pinna without deformityNeck: Trachea midline, no masses, no thyromegaly.Lymph:  No cervical lymphadenopathy, no axillary lymphadenopathy, no supraclavicular lymphadenopathyRespiratory: Unlabored respiratory effort, lungs clear to auscultation, no wheezes, no ronchi.Cardiovascular: Normal S1, S2, patient has a 3/6 systolic ejection murmur heard both at the right and left sternal borders, no edema.Abdomen: Soft, non-tender, no masses, no hepatosplenomegaly.Psych: Alert and oriented x3, normal affect and mood.Neuro: Cranial nerves 2-12 are grossly intact, no loss of sensation LEExt:no joint laxity noted in bilateral arms, no joint laxity noted in bilateral legs, gait without antalgia and without foot drop, there is evidence of Heberden's nodes most DIP joints of both hands but no evidence of swelling today, no sausage digits, no synovitis. No flexure contractures of the elbows, tenderness to palpation bilateral shoulders but with full range of motion. , no christin swan-neck or boutonniere deformities, no ulnar deviation, no flexure contractures in the elbows, knees with crepitus bilaterally but no effusions, no Achilles tendon inflammation.  Lab Results   Component Value Date/Time    QNTTBGOLD Negative 06/29/2016 02:03 PM     Lab Results   Component Value Date/Time    HEPBCORIGM Negative 06/29/2016 02:03 PM    HEPBSAG Negative 06/29/2016 02:03 PM     Lab Results   Component Value Date/Time    SODIUM 128 (L) 09/28/2017 09:06 PM    POTASSIUM 3.6 09/28/2017 09:06 PM    CHLORIDE 98 09/28/2017 09:06 PM    CO2 23 09/28/2017 09:06 PM    GLUCOSE 107 (H) 09/28/2017 09:06 PM    BUN 17 09/28/2017 09:06 PM    CREATININE 0.62 09/28/2017 09:06 PM      Lab Results   Component Value Date/Time    WBC 4.7 (L) 09/28/2017 09:06 PM    RBC 3.86 (L) 09/28/2017 09:06 PM    HEMOGLOBIN 12.5 09/28/2017 09:06 PM    HEMATOCRIT 36.3 (L) 09/28/2017 09:06 PM    MCV 94.0 09/28/2017 09:06 PM    MCH 32.4 09/28/2017 09:06 PM     MCHC 34.4 09/28/2017 09:06 PM    MPV 11.3 09/28/2017 09:06 PM    NEUTSPOLYS 67.80 09/28/2017 09:06 PM    LYMPHOCYTES 15.70 (L) 09/28/2017 09:06 PM    MONOCYTES 15.90 (H) 09/28/2017 09:06 PM    EOSINOPHILS 0.00 09/28/2017 09:06 PM    BASOPHILS 0.40 09/28/2017 09:06 PM      Lab Results   Component Value Date/Time    CALCIUM 8.2 (L) 09/28/2017 09:06 PM    ASTSGOT 32 09/28/2017 09:06 PM    ALTSGPT 42 09/28/2017 09:06 PM    ALKPHOSPHAT 39 09/28/2017 09:06 PM    TBILIRUBIN 0.8 09/28/2017 09:06 PM    ALBUMIN 3.5 09/28/2017 09:06 PM    TOTPROTEIN 6.1 09/28/2017 09:06 PM     Lab Results   Component Value Date/Time    URICACID 4.5 06/29/2016 02:03 PM    RHEUMFACTN <10 06/29/2016 02:03 PM    CCPANTIBODY 4 06/29/2016 02:03 PM     Lab Results   Component Value Date/Time    SEDRATEWES 17 08/07/2017 08:45 AM     Lab Results   Component Value Date/Time    HBA1C 5.5 05/16/2016 07:50 AM     Lab Results   Component Value Date/Time    CPKTOTAL 61 06/29/2016 02:03 PM     Results for orders placed during the hospital encounter of 03/18/16   DX-JOINT SURVEY-HANDS SINGLE VIEW    Impression Multiple bilateral sites of osteoarthritis     Results for orders placed during the hospital encounter of 03/18/16   DX-JOINT SURVEY-FEET SINGLE VIEW    Impression 1.  Bilateral midfoot and forefoot osteoarthritis    2.  Bilateral bunion    3.  Prior fusion across the right 2nd proximal interphalangeal joint    4.  Foreign body or opaque material between 1st and 2nd phalanges     Results for orders placed during the hospital encounter of 03/18/16   DS-BONE DENSITY STUDY (DEXA)    Impression According to the World Health Organization classification, bone mineral density of this patient is osteopenia with increased risk of fracture.        10-year Probability of Fracture:  Major Osteoporotic     14.3%  Hip     3.7%  Population      [place] ()    Based on left femur neck BMD          INTERPRETING LOCATION:  [address] [place] [address], [id number]     Results  for orders placed during the hospital encounter of 01/14/09   DX-KNEE COMPLETE 4+    Impression IMPRESSION:     MILD PATELLOFEMORAL AND MEDIAL FEMOROTIBIAL COMPARTMENT DEGENERATIVE   OSTEOARTHROSIS.        GEK:leonie     Read By ALEX WISE MD on Jan 14 2009 10:01AM  : DANA Transcription Date: Jovi 15 2009  8:11AM  THIS DOCUMENT HAS BEEN ELECTRONICALLY SIGNED BY: ALEX WISE MD on   Jan 16 2009  1:32PM        Results for orders placed during the hospital encounter of 01/14/09   DX-SHOULDER 2+    Impression IMPRESSION:     NORMAL RADIOGRAPHS OF THE LEFT SHOULDER WITH NOTE MADE OF MINIMAL   DEGENERATIVE OSTEOARTHROSIS OF THE GLENOHUMERAL JOINT WITH MINIMAL   SPURRING.           Results for orders placed during the hospital encounter of 05/12/17   MR-LUMBAR SPINE-W/O    Impression 1.  Mild spinal stenosis at L3-4    2.  Multilevel foraminal stenoses describing the findings section    3.  Degenerative subluxations at T12-L1, L1-2, and L4-5    4.  Mild edematous endplate change adjacent to T12-L1    5.  Incidental hemangioma within the left side of L4    6.  8 mm focus of marrow placement within L2. Differential diagnosis includes atypical hemangioma or a metastasis. Significance of this finding depends almost exclusively on whether there is a history of known malignancy.     Results for orders placed during the hospital encounter of 01/14/09   DX-CERVICAL SPINE-2 OR 3 VIEWS    Impression IMPRESSION:     DEGENERATIVE DISC AND FACET ARTHROPATHY C5-6, C6-7, AND DEGENERATIVE   FACET ARTHROPATHY AT C7-T1.           Assessment and Plan:     1. Rheumatoid arthritis involving multiple sites with positive rheumatoid factor (CMS-HCC)  Continue XELJANZ 5 mg by mouth twice a day, will refill pain medication today  Patient due for labs March 2018, lab orders written for patient.  - hydrocodone-acetaminophen (NORCO) 5-325 MG Tab per tablet; 1 tab po bid for severe joint pain, NO ALCOHOL , NO DRIVING and NO  MARIJUANA  within 24 hrs of taking this medication, NO BENZODIAZEPINE medications, no selling or giving to any other persons  Dispense: 30 Tab; Refill: 0  - CBC WITH DIFFERENTIAL; Future  - COMP METABOLIC PANEL; Future  - WESTERGREN SED RATE; Future    2. Osteopenia of multiple sites  Last DEXA March 2016, next DEXA March 2018 will order next visit   continue calcium 1200 mg by mouth daily and vitamin D about 1000 units by mouth daily and magnesium 400 mg by mouth daily  - hydrocodone-acetaminophen (NORCO) 5-325 MG Tab per tablet; 1 tab po bid for severe joint pain, NO ALCOHOL , NO DRIVING and NO MARIJUANA  within 24 hrs of taking this medication, NO BENZODIAZEPINE medications, no selling or giving to any other persons  Dispense: 30 Tab; Refill: 0  - CBC WITH DIFFERENTIAL; Future  - COMP METABOLIC PANEL; Future  - WESTERGREN SED RATE; Future    3. Long term current use of anticoagulants with INR goal of 2.0-3.0  NSAIDs contraindicated, patient on Xeralto, Tylenol and Norco when necessary  - hydrocodone-acetaminophen (NORCO) 5-325 MG Tab per tablet; 1 tab po bid for severe joint pain, NO ALCOHOL , NO DRIVING and NO MARIJUANA  within 24 hrs of taking this medication, NO BENZODIAZEPINE medications, no selling or giving to any other persons  Dispense: 30 Tab; Refill: 0  - CBC WITH DIFFERENTIAL; Future  - COMP METABOLIC PANEL; Future  - WESTERGREN SED RATE; Future    4. Protein S deficiency (CMS-HCC)  Requiring chronic anticoagulation, with history of DVT    5. Essential hypertension, benign  May impact the type of medications we can use for this patient's arthritis. We will have to keep this under advisement.    6. Right bundle branch block  Patient is followed up by cardiology Dr. Jacobo, patient has an echocardiogram pending    7. Chronic midline low back pain without sciatica  Patient sees her spine clinic doctor tomorrow, considering restarting Lyrica and possibly needing a epidural    Followup: Return in about 3  months (around 3/4/2018). or sooner prn    Patient was seen 30 minutes face-to-face of which more than 50% of the time was spent counseling the patient (excluding time for procedures)  regarding  rheumatological condition and care. Therapy was discussed in detail.    Please note that this dictation was created using voice recognition software. I have made every reasonable attempt to correct obvious errors, but I expect that there are errors of grammar and possibly content that I did not discover before finalizing the note.

## 2017-12-11 ENCOUNTER — PATIENT MESSAGE (OUTPATIENT)
Dept: MEDICAL GROUP | Facility: PHYSICIAN GROUP | Age: 82
End: 2017-12-11

## 2017-12-11 DIAGNOSIS — M05.79 RHEUMATOID ARTHRITIS INVOLVING MULTIPLE SITES WITH POSITIVE RHEUMATOID FACTOR (HCC): ICD-10-CM

## 2017-12-11 NOTE — TELEPHONE ENCOUNTER
Was the patient seen in the last year in this department? Yes   Sept labs    Does patient have an active prescription for medications requested? No     Received Request Via: Pharmacy

## 2017-12-11 NOTE — TELEPHONE ENCOUNTER
----- Message from Marry Mathur sent at 12/11/2017  9:49 AM PST -----  Regarding: Non-Urgent Medical Question  Contact: [phone]  I did not have lab work scheduled for Dr. Escoto and did not schedule any lab work for you.  Since we reduced my medications I wanted to be sure you did not want to have any lab work for my apt. the end of December.   Thank you for your time,  Marry Mathur

## 2017-12-19 ENCOUNTER — HOSPITAL ENCOUNTER (OUTPATIENT)
Dept: RADIOLOGY | Facility: MEDICAL CENTER | Age: 82
End: 2017-12-19
Attending: INTERNAL MEDICINE
Payer: MEDICARE

## 2017-12-19 DIAGNOSIS — Z12.39 SCREENING FOR BREAST CANCER: ICD-10-CM

## 2017-12-19 PROCEDURE — G0202 SCR MAMMO BI INCL CAD: HCPCS

## 2017-12-28 ENCOUNTER — OFFICE VISIT (OUTPATIENT)
Dept: MEDICAL GROUP | Facility: PHYSICIAN GROUP | Age: 82
End: 2017-12-28
Payer: MEDICARE

## 2017-12-28 VITALS
SYSTOLIC BLOOD PRESSURE: 112 MMHG | OXYGEN SATURATION: 96 % | HEART RATE: 74 BPM | BODY MASS INDEX: 28.7 KG/M2 | RESPIRATION RATE: 16 BRPM | WEIGHT: 162 LBS | TEMPERATURE: 98 F | DIASTOLIC BLOOD PRESSURE: 88 MMHG | HEIGHT: 63 IN

## 2017-12-28 DIAGNOSIS — R73.01 IMPAIRED FASTING GLUCOSE: ICD-10-CM

## 2017-12-28 DIAGNOSIS — M05.79 RHEUMATOID ARTHRITIS INVOLVING MULTIPLE SITES WITH POSITIVE RHEUMATOID FACTOR (HCC): ICD-10-CM

## 2017-12-28 DIAGNOSIS — I10 ESSENTIAL HYPERTENSION, BENIGN: ICD-10-CM

## 2017-12-28 DIAGNOSIS — E78.5 HYPERLIPIDEMIA, UNSPECIFIED HYPERLIPIDEMIA TYPE: ICD-10-CM

## 2017-12-28 PROCEDURE — 99214 OFFICE O/P EST MOD 30 MIN: CPT | Performed by: INTERNAL MEDICINE

## 2017-12-28 RX ORDER — ATORVASTATIN CALCIUM 10 MG/1
10 TABLET, FILM COATED ORAL DAILY
Qty: 90 TAB | Refills: 3 | Status: SHIPPED | OUTPATIENT
Start: 2017-12-28 | End: 2018-11-12 | Stop reason: SDUPTHER

## 2017-12-28 NOTE — ASSESSMENT & PLAN NOTE
Had been transitioned from coumadin to xarelto at her last visit when she established care with me on 11/2/17. She is tolerating the xarelto well and completed the regimen of 15 mg bid and has been taking the 20 mg dose daily now.

## 2017-12-28 NOTE — PROGRESS NOTES
PRIMARY CARE CLINIC FOLLOW UP VISIT  Chief Complaint   Patient presents with   • Follow-Up     medications      History of Present Illness     Marry is a pleasant 83 yo here for follow up of recent medication changes. She has been doing well and just returned from visiting her family in [place] for the [place] holidays. Is leaving for a cruise to [place] with her grand daughter on 1/2/18 and after that they will be spending the remainder of the month in the [place].     Essential hypertension, benign  Stopped her blood pressure medication (diovan 80-12.5 mg).     Impaired fasting glucose  Was taken off of metformin when I last saw her 11/2/17 to avoid polypharmacy with close monitoring of her blood glucoses.     Deep vein thrombosis (CMS-HCC)  Had been transitioned from coumadin to xarelto at her last visit when she established care with me on 11/2/17. She is tolerating the xarelto well and completed the regimen of 15 mg bid and has been taking the 20 mg dose daily now.     Rheumatoid arthritis involving multiple sites with positive rheumatoid factor (CMS-[facility])  Following with her rheumatologist, tolerating Xeljanz well.     Hyperlipidemia  Lipids when last checked were at goal. Will cut lipitor in half from 20 mg to 10 mg.     Current Outpatient Prescriptions   Medication Sig Dispense Refill   • LYRICA 75 MG Cap      • atorvastatin (LIPITOR) 10 MG Tab Take 1 Tab by mouth every day. 90 Tab 3   • Tofacitinib Citrate 5 MG Tab Take 5 mg by mouth 2 Times a Day. 180 Tab 0   • hydrocodone-acetaminophen (NORCO) 5-325 MG Tab per tablet 1 tab po bid for severe joint pain, NO ALCOHOL , NO DRIVING and NO MARIJUANA  within 24 hrs of taking this medication, NO BENZODIAZEPINE medications, no selling or giving to any other persons 30 Tab 0   • omeprazole (PRILOSEC) 20 MG delayed-release capsule Take 1 Cap by mouth every day. 90 Cap 3   • rivaroxaban (XARELTO) 20 MG Tab tablet Take 1 Tab by mouth with dinner. 90 Tab 3   •  Psyllium (METAMUCIL FIBER PO) Take  by mouth.     • Wheat Dextrin (BENEFIBER DRINK MIX PO) Take  by mouth.     • lidocaine (LIDODERM) 5 % Patch Apply 1 Patch to skin as directed every 24 hours. 90 Patch 1   • acetaminophen (TYLENOL) 325 MG Tab Take 650 mg by mouth at bedtime as needed.     • vitamin D (CHOLECALCIFEROL) 1000 UNIT Tab Take 1,000 Units by mouth every day.     • POTASSIUM GLUCONATE Take 1 Tab by mouth every day.     • Calcium Carbonate-Vitamin D (CALCIUM + D PO) Take 1 Tab by mouth every day.       No current facility-administered medications for this visit.      Past Medical History:   Diagnosis Date   • Atrial fibrillation [I48.91] 4/19/2016   • Back pain 6/27/2012   • Blood clotting disorder (CMS-HCC) 2012    clot in leg   • Cancer (CMS-HCC)     skin   • Chronic back pain greater than 3 months duration    • Deep vein thrombosis (CMS-HCC) 3/8/2016    First occurrence in LLE in late 1970s Second occurrence further up in LLE in 2012, has been on AC since    • Essential hypertension, benign 6/27/2012   • GERD (gastroesophageal reflux disease) 6/27/2012   • Hiatus hernia syndrome    • Hyperlipidemia    • Hypertension    • Impaired fasting glucose 11/2/2017   • Mild aortic stenosis 11/2/2017   • Other and unspecified hyperlipidemia 6/27/2012   • Prediabetes    • Protein S deficiency (CMS-HCC) 11/2/2017    Noted in lab work 2013 as a part of work up at [facility]    • Rheumatoid arthritis involving multiple sites with positive rheumatoid factor (CMS-HCC) 03/14/2016    Dr. Escoto   • Rheumatoid nodule (CMS-HCC) 7/25/2017   • Right bundle branch block 6/27/2012   • Urinary incontinence 11/2/2017     Past Surgical History:   Procedure Laterality Date   • INGUINAL HERNIA REPAIR Right 9/23/2016    Procedure: INGUINAL HERNIA REPAIR - PRIMARY;  Surgeon: Mary Medina M.D.;  Location: SURGERY [facility];  Service:    • OTHER ORTHOPEDIC SURGERY  2014    left knee partial   • OTHER ORTHOPEDIC SURGERY   "    meniscus repair   • HYSTERECTOMY, TOTAL ABDOMINAL     • ATHROPLASTY      partial TKA with Dr. Persaud   • CHOLECYSTECTOMY       Social History   Substance Use Topics   • Smoking status: Never Smoker   • Smokeless tobacco: Never Used   • Alcohol use No     Social History     Social History Narrative    Retired from [employer]. 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 All other systems reviewed and negative.        Objective   Blood pressure 112/88, pulse 74, temperature 36.7 °C (98 °F), resp. rate 16, height 1.6 m (5' 3\"), weight 73.5 kg (162 lb), SpO2 96 %. Body mass index is 28.7 kg/m².    General: alert and oriented, pleasant, cooperative  HEENT: Normocephalic, atraumatic. Oropharynx clear without exudate or injection.   Cardiovascular: regular rate and rhythm, normal S1/S2. Systolic ejection murmur best heard at right upper sternal border  Pulmonary: lungs clear to auscultation bilaterally  Lymphatics: no cervical or supraclavicular lymphadenopathy   Skin: warm and dry, no lesions or rashes  Psychiatric: appropriate mood and affect. Good insight and appropriate judgment     Assessment and Plan   The following treatment plan was discussed     1. Essential hypertension, benign  At goal today 112/88 off of her Diovan. Continue to keep her off of anti-hypertensives as her blood pressure is well enough controlled off of them.     2. Impaired fasting glucose  Was taken off of metformin when I first met her last month 17 to limit polypharmacy. Will check her fasting glucose, patient says she can come in tomorrow before leaving " for her trip.   - BLOOD GLUCOSE; Future    3. Rheumatoid arthritis involving multiple sites with positive rheumatoid factor (CMS-[facility])  Following regularly with rheumatologist, tolerating Xeljanz.     4. Hyperlipidemia, unspecified hyperlipidemia type  Her lipids when last checked 2/2017 were at goal on atorvastatin 20 mg. Will trial cutting dose in half to 10 mg and recheck lipid panel in 6 months.   - atorvastatin (LIPITOR) 10 MG Tab; Take 1 Tab by mouth every day.  Dispense: 90 Tab; Refill: 3    Lab Results   Component Value Date/Time    CHOLSTRLTOT 151 02/28/2017 06:37 AM    LDL 84 02/28/2017 06:37 AM    HDL 54 02/28/2017 06:37 AM    TRIGLYCERIDE 67 02/28/2017 06:37 AM     Healthcare maintenance     Health Maintenance Due   Topic Date Due   • Annual Wellness Visit  1935       Return in about 6 months (around 6/28/2018).    Eric Cook MD  Internal Medicine  [facility]

## 2017-12-28 NOTE — ASSESSMENT & PLAN NOTE
Was taken off of metformin when I last saw her 11/2/17 to avoid polypharmacy with close monitoring of her blood glucoses.

## 2017-12-29 ENCOUNTER — HOSPITAL ENCOUNTER (OUTPATIENT)
Dept: LAB | Facility: MEDICAL CENTER | Age: 82
End: 2017-12-29
Attending: INTERNAL MEDICINE
Payer: MEDICARE

## 2017-12-29 DIAGNOSIS — R73.01 IMPAIRED FASTING GLUCOSE: ICD-10-CM

## 2017-12-29 LAB — GLUCOSE SERPL-MCNC: 97 MG/DL (ref 65–99)

## 2017-12-29 PROCEDURE — 82947 ASSAY GLUCOSE BLOOD QUANT: CPT

## 2017-12-29 PROCEDURE — 36415 COLL VENOUS BLD VENIPUNCTURE: CPT

## 2018-02-07 ENCOUNTER — APPOINTMENT (RX ONLY)
Dept: URBAN - METROPOLITAN AREA CLINIC 31 | Facility: CLINIC | Age: 83
Setting detail: DERMATOLOGY
End: 2018-02-07

## 2018-02-07 DIAGNOSIS — Z87.2 PERSONAL HISTORY OF DISEASES OF THE SKIN AND SUBCUTANEOUS TISSUE: ICD-10-CM

## 2018-02-07 DIAGNOSIS — L57.0 ACTINIC KERATOSIS: ICD-10-CM

## 2018-02-07 DIAGNOSIS — Z71.89 OTHER SPECIFIED COUNSELING: ICD-10-CM

## 2018-02-07 DIAGNOSIS — Z86.006 PERSONAL HISTORY OF MELANOMA IN-SITU: ICD-10-CM

## 2018-02-07 DIAGNOSIS — D18.0 HEMANGIOMA: ICD-10-CM

## 2018-02-07 DIAGNOSIS — D22 MELANOCYTIC NEVI: ICD-10-CM

## 2018-02-07 DIAGNOSIS — L81.4 OTHER MELANIN HYPERPIGMENTATION: ICD-10-CM

## 2018-02-07 DIAGNOSIS — Z85.828 PERSONAL HISTORY OF OTHER MALIGNANT NEOPLASM OF SKIN: ICD-10-CM

## 2018-02-07 DIAGNOSIS — L82.1 OTHER SEBORRHEIC KERATOSIS: ICD-10-CM

## 2018-02-07 DIAGNOSIS — Z85.820 PERSONAL HISTORY OF MALIGNANT MELANOMA OF SKIN: ICD-10-CM

## 2018-02-07 PROBLEM — D48.5 NEOPLASM OF UNCERTAIN BEHAVIOR OF SKIN: Status: ACTIVE | Noted: 2018-02-07

## 2018-02-07 PROBLEM — D18.01 HEMANGIOMA OF SKIN AND SUBCUTANEOUS TISSUE: Status: ACTIVE | Noted: 2018-02-07

## 2018-02-07 PROBLEM — D22.9 MELANOCYTIC NEVI, UNSPECIFIED: Status: ACTIVE | Noted: 2018-02-07

## 2018-02-07 PROCEDURE — 17004 DESTROY PREMAL LESIONS 15/>: CPT

## 2018-02-07 PROCEDURE — ? LIQUID NITROGEN

## 2018-02-07 PROCEDURE — 11101: CPT

## 2018-02-07 PROCEDURE — 11100: CPT | Mod: 59

## 2018-02-07 PROCEDURE — ? COUNSELING

## 2018-02-07 PROCEDURE — 99213 OFFICE O/P EST LOW 20 MIN: CPT | Mod: 25

## 2018-02-07 PROCEDURE — ? BIOPSY BY SHAVE METHOD

## 2018-02-07 ASSESSMENT — LOCATION ZONE DERM
LOCATION ZONE: ARM
LOCATION ZONE: FACE
LOCATION ZONE: TRUNK
LOCATION ZONE: EYELID
LOCATION ZONE: HAND
LOCATION ZONE: EAR
LOCATION ZONE: LIP
LOCATION ZONE: NECK
LOCATION ZONE: NECK
LOCATION ZONE: NOSE

## 2018-02-07 ASSESSMENT — LOCATION SIMPLE DESCRIPTION DERM
LOCATION SIMPLE: LEFT UPPER BACK
LOCATION SIMPLE: LEFT CLAVICULAR SKIN
LOCATION SIMPLE: RIGHT ANTERIOR NECK
LOCATION SIMPLE: LEFT CHEEK
LOCATION SIMPLE: RIGHT LIP
LOCATION SIMPLE: RIGHT HAND
LOCATION SIMPLE: RIGHT FOREARM
LOCATION SIMPLE: LEFT INFERIOR EYELID
LOCATION SIMPLE: CHEST
LOCATION SIMPLE: RIGHT CLAVICULAR SKIN
LOCATION SIMPLE: RIGHT FOREHEAD
LOCATION SIMPLE: RIGHT EYELID
LOCATION SIMPLE: LEFT EYEBROW
LOCATION SIMPLE: NOSE
LOCATION SIMPLE: RIGHT UPPER BACK
LOCATION SIMPLE: LEFT TEMPLE
LOCATION SIMPLE: RIGHT EAR
LOCATION SIMPLE: RIGHT UPPER ARM
LOCATION SIMPLE: RIGHT ANTERIOR NECK
LOCATION SIMPLE: POSTERIOR NECK
LOCATION SIMPLE: LEFT EAR
LOCATION SIMPLE: RIGHT CHEEK

## 2018-02-07 ASSESSMENT — LOCATION DETAILED DESCRIPTION DERM
LOCATION DETAILED: RIGHT SUPERIOR LATERAL MALAR CHEEK
LOCATION DETAILED: RIGHT DISTAL DORSAL FOREARM
LOCATION DETAILED: LEFT CLAVICULAR SKIN
LOCATION DETAILED: NASAL SUPRATIP
LOCATION DETAILED: RIGHT INFERIOR ANTERIOR NECK
LOCATION DETAILED: RIGHT ULNAR DORSAL HAND
LOCATION DETAILED: LEFT SUPERIOR CENTRAL BUCCAL CHEEK
LOCATION DETAILED: RIGHT INFERIOR PREAURICULAR CHEEK
LOCATION DETAILED: LEFT SUPERIOR MEDIAL MALAR CHEEK
LOCATION DETAILED: LEFT MID TEMPLE
LOCATION DETAILED: RIGHT INFERIOR MEDIAL FOREHEAD
LOCATION DETAILED: RIGHT INFERIOR ANTERIOR NECK
LOCATION DETAILED: RIGHT INFERIOR VERMILION LIP
LOCATION DETAILED: RIGHT ANTERIOR DISTAL UPPER ARM
LOCATION DETAILED: LEFT CENTRAL EYEBROW
LOCATION DETAILED: RIGHT MEDIAL SUPERIOR CHEST
LOCATION DETAILED: LEFT LATERAL INFERIOR EYELID
LOCATION DETAILED: RIGHT INFERIOR CENTRAL MALAR CHEEK
LOCATION DETAILED: RIGHT SUPERIOR LATERAL FOREHEAD
LOCATION DETAILED: RIGHT SUPERIOR HELIX
LOCATION DETAILED: RIGHT MEDIAL CANTHUS
LOCATION DETAILED: RIGHT SUPERIOR ANTERIOR NECK
LOCATION DETAILED: RIGHT MEDIAL UPPER BACK
LOCATION DETAILED: RIGHT CLAVICULAR SKIN
LOCATION DETAILED: LEFT MID-UPPER BACK
LOCATION DETAILED: LEFT LATERAL SUPERIOR CHEST
LOCATION DETAILED: MID POSTERIOR NECK
LOCATION DETAILED: LEFT INFERIOR HELIX
LOCATION DETAILED: MIDDLE STERNUM

## 2018-02-07 NOTE — PROCEDURE: BIOPSY BY SHAVE METHOD
Lab: [id number]
Electrodesiccation And Curettage Text: The wound bed was treated with electrodesiccation and curettage after the biopsy was performed.
Biopsy Type: H and E
Size Of Lesion In Cm: 0.8
Type Of Destruction Used: Curettage
Hemostasis: Drysol and Electrocautery
Lab Facility: 
Biopsy Method: Personna blade
Anesthesia Volume In Cc: 0.5
Wound Care: Vaseline
Consent: Written consent was obtained and risks were reviewed including but not limited to scarring, infection, bleeding, scabbing, incomplete removal, nerve damage and allergy to anesthesia.
Bill 81906 For Specimen Handling/Conveyance To Laboratory?: no
Cryotherapy Text: The wound bed was treated with cryotherapy after the biopsy was performed.
Render Post-Care Instructions In Note?: yes
Silver Nitrate Text: The wound bed was treated with silver nitrate after the biopsy was performed.
Anesthesia Type: 1% lidocaine with epinephrine
Dressing: bandage
X Size Of Lesion In Cm: 0
Electrodesiccation Text: The wound bed was treated with electrodesiccation after the biopsy was performed.
Post-Care Instructions: I reviewed with the patient in detail post-care instructions. Patient is to keep the biopsy site dry overnight, and then apply vaseline twice daily until healed.
Detail Level: Detailed
Billing Type: Third-Party Bill
Curettage Text: The wound bed was treated with curettage after the biopsy was performed.
Notification Instructions: Patient will be notified of biopsy results. However, patient instructed to call the office if not contacted within 2 weeks.
Size Of Lesion In Cm: 0.6
Size Of Lesion In Cm: 2
Size Of Lesion In Cm: 0.3

## 2018-02-21 ENCOUNTER — APPOINTMENT (RX ONLY)
Dept: URBAN - METROPOLITAN AREA CLINIC 31 | Facility: CLINIC | Age: 83
Setting detail: DERMATOLOGY
End: 2018-02-21

## 2018-02-21 ENCOUNTER — APPOINTMENT (RX ONLY)
Dept: URBAN - METROPOLITAN AREA CLINIC 4 | Facility: CLINIC | Age: 83
Setting detail: DERMATOLOGY
End: 2018-02-21

## 2018-02-21 DIAGNOSIS — L57.0 ACTINIC KERATOSIS: ICD-10-CM

## 2018-02-21 PROBLEM — C44.629 SQUAMOUS CELL CARCINOMA OF SKIN OF LEFT UPPER LIMB, INCLUDING SHOULDER: Status: ACTIVE | Noted: 2018-02-21

## 2018-02-21 PROBLEM — C44.319 BASAL CELL CARCINOMA OF SKIN OF OTHER PARTS OF FACE: Status: ACTIVE | Noted: 2018-02-21

## 2018-02-21 PROBLEM — D03.72 MELANOMA IN SITU OF LEFT LOWER LIMB, INCLUDING HIP: Status: ACTIVE | Noted: 2018-02-21

## 2018-02-21 PROCEDURE — 17000 DESTRUCT PREMALG LESION: CPT

## 2018-02-21 PROCEDURE — ? ADDITIONAL NOTES

## 2018-02-21 PROCEDURE — ? COUNSELING

## 2018-02-21 PROCEDURE — ? LIQUID NITROGEN

## 2018-02-21 PROCEDURE — ? PRESCRIPTION

## 2018-02-21 PROCEDURE — ? MOHS SURGERY PHONE CONSULTATION

## 2018-02-21 RX ORDER — MUPIROCIN 20 MG/G
OINTMENT TOPICAL
Qty: 1 | Refills: 0 | Status: ERX

## 2018-02-21 ASSESSMENT — LOCATION SIMPLE DESCRIPTION DERM: LOCATION SIMPLE: SCALP

## 2018-02-21 ASSESSMENT — LOCATION DETAILED DESCRIPTION DERM: LOCATION DETAILED: LEFT SUPERIOR PARIETAL SCALP

## 2018-02-21 ASSESSMENT — LOCATION ZONE DERM: LOCATION ZONE: SCALP

## 2018-02-21 NOTE — PROCEDURE: ADDITIONAL NOTES
Additional Notes: I discussed with patient site does not appear to be infected.  We discussed lower legs are slow to heal.  Will try mupiricin tid for ten days, if any worse or any increased redness call for rx for doxycycline.
Additional Notes: To be scheduled today for mohs 2 weeks out

## 2018-02-21 NOTE — PROCEDURE: MOHS SURGERY PHONE CONSULTATION
Date Of Mohs Surgery: 03/28/2018
Has The Pathology Report Been Received?: Yes
Patient's Insurance: Medicare
Has The Patient Ever Had A Heart Attack Or Stroke?: No
If Yes- What Is The Patient's Pharmacy Number?: Knee replaced in over 10 years, pt feels she does not need them
Office Location Of Mohs Surgery: [facility]
Time Of Mohs Surgery: 1146
If Yes- Additional Details: in 2009
Patient Reported Location: L dorsal thumb
If Yes- What Blood Thinners Do They Take?: Xarelto
Detail Level: Simple

## 2018-02-21 NOTE — PROCEDURE: MOHS SURGERY PHONE CONSULTATION
Date Of Mohs Surgery: 04/10/2018
Patient Reported Location: L lateral eyebrow
If Yes- What Blood Thinners Do They Take?: Xarelto
Does The Patient Take Blood Thinners?: Yes
Has The Patient Ever Had A Heart Attack Or Stroke?: No
Office Location Of Mohs Surgery: [facility]
Patient's Insurance: Medicare
Detail Level: Simple
If Yes- Additional Details: in 2009
Time Of Mohs Surgery: 1200
If Yes- What Is The Patient's Pharmacy Number?: Knee replaced in over 10 years, pt feels she does not need them

## 2018-02-21 NOTE — PROCEDURE: MOHS SURGERY PHONE CONSULTATION
Has The Patient Ever Had A Heart Attack Or Stroke?: No
Patient's Insurance: Medicare
Patient Reported Location: L medial pretibial region
If Yes- What Blood Thinners Do They Take?: Xarelto
Date Of Mohs Surgery: 03/14/2018
Has The Patient Ever Been Seen In Our Practice For Mohs Surgery Before?: Yes
If Yes- Additional Details: in 2009
Detail Level: Simple
If Yes- What Is The Patient's Pharmacy Number?: Knee replaced in over 10 years, pt feels she does not need them
Office Location Of Mohs Surgery: [facility]
Time Of Mohs Surgery: 0810

## 2018-02-26 ENCOUNTER — HOSPITAL ENCOUNTER (OUTPATIENT)
Dept: LAB | Facility: MEDICAL CENTER | Age: 83
End: 2018-02-26
Attending: INTERNAL MEDICINE
Payer: MEDICARE

## 2018-02-26 DIAGNOSIS — M85.89 OSTEOPENIA OF MULTIPLE SITES: ICD-10-CM

## 2018-02-26 DIAGNOSIS — Z79.01 LONG TERM CURRENT USE OF ANTICOAGULANTS WITH INR GOAL OF 2.0-3.0: ICD-10-CM

## 2018-02-26 DIAGNOSIS — M05.79 RHEUMATOID ARTHRITIS INVOLVING MULTIPLE SITES WITH POSITIVE RHEUMATOID FACTOR (HCC): ICD-10-CM

## 2018-02-26 LAB
ALBUMIN SERPL BCP-MCNC: 4.1 G/DL (ref 3.2–4.9)
ALBUMIN/GLOB SERPL: 1.6 G/DL
ALP SERPL-CCNC: 52 U/L (ref 30–99)
ALT SERPL-CCNC: 17 U/L (ref 2–50)
ANION GAP SERPL CALC-SCNC: 6 MMOL/L (ref 0–11.9)
AST SERPL-CCNC: 21 U/L (ref 12–45)
BASOPHILS # BLD AUTO: 0.8 % (ref 0–1.8)
BASOPHILS # BLD: 0.05 K/UL (ref 0–0.12)
BILIRUB SERPL-MCNC: 0.7 MG/DL (ref 0.1–1.5)
BUN SERPL-MCNC: 28 MG/DL (ref 8–22)
CALCIUM SERPL-MCNC: 8.7 MG/DL (ref 8.5–10.5)
CHLORIDE SERPL-SCNC: 106 MMOL/L (ref 96–112)
CO2 SERPL-SCNC: 29 MMOL/L (ref 20–33)
CREAT SERPL-MCNC: 1.17 MG/DL (ref 0.5–1.4)
EOSINOPHIL # BLD AUTO: 0.16 K/UL (ref 0–0.51)
EOSINOPHIL NFR BLD: 2.5 % (ref 0–6.9)
ERYTHROCYTE [DISTWIDTH] IN BLOOD BY AUTOMATED COUNT: 46.9 FL (ref 35.9–50)
ERYTHROCYTE [SEDIMENTATION RATE] IN BLOOD BY WESTERGREN METHOD: 10 MM/HOUR (ref 0–30)
GLOBULIN SER CALC-MCNC: 2.5 G/DL (ref 1.9–3.5)
GLUCOSE SERPL-MCNC: 98 MG/DL (ref 65–99)
HCT VFR BLD AUTO: 43.8 % (ref 37–47)
HGB BLD-MCNC: 14 G/DL (ref 12–16)
IMM GRANULOCYTES # BLD AUTO: 0.01 K/UL (ref 0–0.11)
IMM GRANULOCYTES NFR BLD AUTO: 0.2 % (ref 0–0.9)
LYMPHOCYTES # BLD AUTO: 1.29 K/UL (ref 1–4.8)
LYMPHOCYTES NFR BLD: 20.3 % (ref 22–41)
MCH RBC QN AUTO: 30.8 PG (ref 27–33)
MCHC RBC AUTO-ENTMCNC: 32 G/DL (ref 33.6–35)
MCV RBC AUTO: 96.3 FL (ref 81.4–97.8)
MONOCYTES # BLD AUTO: 0.56 K/UL (ref 0–0.85)
MONOCYTES NFR BLD AUTO: 8.8 % (ref 0–13.4)
NEUTROPHILS # BLD AUTO: 4.3 K/UL (ref 2–7.15)
NEUTROPHILS NFR BLD: 67.4 % (ref 44–72)
NRBC # BLD AUTO: 0 K/UL
NRBC BLD-RTO: 0 /100 WBC
PLATELET # BLD AUTO: 177 K/UL (ref 164–446)
PMV BLD AUTO: 11.7 FL (ref 9–12.9)
POTASSIUM SERPL-SCNC: 4.3 MMOL/L (ref 3.6–5.5)
PROT SERPL-MCNC: 6.6 G/DL (ref 6–8.2)
RBC # BLD AUTO: 4.55 M/UL (ref 4.2–5.4)
SODIUM SERPL-SCNC: 141 MMOL/L (ref 135–145)
WBC # BLD AUTO: 6.4 K/UL (ref 4.8–10.8)

## 2018-02-26 PROCEDURE — 80053 COMPREHEN METABOLIC PANEL: CPT

## 2018-02-26 PROCEDURE — 85652 RBC SED RATE AUTOMATED: CPT

## 2018-02-26 PROCEDURE — 85025 COMPLETE CBC W/AUTO DIFF WBC: CPT

## 2018-02-26 PROCEDURE — 36415 COLL VENOUS BLD VENIPUNCTURE: CPT

## 2018-03-01 ENCOUNTER — OFFICE VISIT (OUTPATIENT)
Dept: RHEUMATOLOGY | Facility: PHYSICIAN GROUP | Age: 83
End: 2018-03-01
Payer: MEDICARE

## 2018-03-01 VITALS
WEIGHT: 170 LBS | BODY MASS INDEX: 30.11 KG/M2 | TEMPERATURE: 97.7 F | HEART RATE: 68 BPM | DIASTOLIC BLOOD PRESSURE: 80 MMHG | SYSTOLIC BLOOD PRESSURE: 130 MMHG | RESPIRATION RATE: 14 BRPM | OXYGEN SATURATION: 97 %

## 2018-03-01 DIAGNOSIS — D68.59 PROTEIN S DEFICIENCY (HCC): ICD-10-CM

## 2018-03-01 DIAGNOSIS — Z79.01 CHRONIC ANTICOAGULATION: ICD-10-CM

## 2018-03-01 DIAGNOSIS — R73.01 IMPAIRED FASTING GLUCOSE: ICD-10-CM

## 2018-03-01 DIAGNOSIS — M81.0 SENILE OSTEOPOROSIS: ICD-10-CM

## 2018-03-01 DIAGNOSIS — I10 ESSENTIAL HYPERTENSION, BENIGN: ICD-10-CM

## 2018-03-01 DIAGNOSIS — M06.30 RHEUMATOID NODULE (HCC): ICD-10-CM

## 2018-03-01 PROCEDURE — 99214 OFFICE O/P EST MOD 30 MIN: CPT | Performed by: INTERNAL MEDICINE

## 2018-03-01 RX ORDER — ALENDRONATE SODIUM 70 MG/1
70 TABLET ORAL
Qty: 12 TAB | Refills: 3 | Status: SHIPPED | OUTPATIENT
Start: 2018-03-01 | End: 2018-08-29

## 2018-03-01 NOTE — PROGRESS NOTES
Chief Complaint- joint pain    Subjective:   Marry Mathur is a 82 y.o. female here today for follow up of rheumatological issues    Pt here with a dx of RA at Dr Peguero office Patient Is currently on XELJANZ 5 mg by mouth twice a day. Patient denies any side effects from the XELJANZ, denies any stomach upset, denies any new rashes, patient is having issues with new skin cancers and is currently being followed by dermatology for such.  No psoriaisis, no iritis/uveitis, no FUO, no weight loss, no recurrent infections, no colitis, no recurrent infections, no new neurological disorders, no fevers of unknown etiology. Of note recent sedimentation rate equals 10    Additional comorbidities include diabetes for which patient  takes metformin, also with a diagnosis of spinal stenosis with intermittent weakness in her legs with progressive numbness and weakness in the left leg. Patient has an appointment with her spine doctor tomorrow  Patient denies any bowel or bladder incontinence. Patient also with a cardiac murmur and is followed by cardiology. Patient also has a history of melanoma and is followed closely by her dermatologist.     S/p Remicade-stopped because of hx of melanoma about 1996 and has multiple other skin cancers  S/p Orencia-lost efficacy  S/p Humira-stopped because of recurrence of melanoma October 2017  S/p MTX-stopped because of development of skin cancer/melanoma October 2017  S/p NSAIDS-relatively contraindicated as patient is on chronic anticoagulation      DEXA 3/18/2016 T scores 1.1, -1.1   FRAX 3/18/2016 major osteoporotic fracture risk 14.3%, hip fracture risks 3.7%  Echocardiogram 7/2012 [facility]  RF neg 3/2014 [facility]; RF neg 6/2014 [facility]; RF neg 6/2016  CCP neg 3/2014 [facility]; CCP neg 6/2014 [facility]; CCP neg 6/2016  Uric acid 4.7 3/2014 [facility];Uric acid 4.5 6/2016  Hep B neg 6/2016  Quantiferon Gold neg 6/2014 [facility]; Quantiferon Gold neg 6/2016  Hand  x-rays 3/2016-indicate osteoarthritis  Feet x-rays 3/2016-indicate osteoarthritis       Current medicines (including changes today)  Current Outpatient Prescriptions   Medication Sig Dispense Refill   • alendronate (FOSAMAX) 70 MG Tab Take 1 Tab by mouth every 7 days. 12 Tab 3   • LYRICA 75 MG Cap      • atorvastatin (LIPITOR) 10 MG Tab Take 1 Tab by mouth every day. (Patient taking differently: Take 20 mg by mouth every day.) 90 Tab 3   • Tofacitinib Citrate 5 MG Tab Take 5 mg by mouth 2 Times a Day. 180 Tab 0   • hydrocodone-acetaminophen (NORCO) 5-325 MG Tab per tablet 1 tab po bid for severe joint pain, NO ALCOHOL , NO DRIVING and NO MARIJUANA  within 24 hrs of taking this medication, NO BENZODIAZEPINE medications, no selling or giving to any other persons 30 Tab 0   • rivaroxaban (XARELTO) 20 MG Tab tablet Take 1 Tab by mouth with dinner. 90 Tab 3   • lidocaine (LIDODERM) 5 % Patch Apply 1 Patch to skin as directed every 24 hours. 90 Patch 1   • acetaminophen (TYLENOL) 325 MG Tab Take 650 mg by mouth at bedtime as needed.     • vitamin D (CHOLECALCIFEROL) 1000 UNIT Tab Take 1,000 Units by mouth every day.     • POTASSIUM GLUCONATE Take 1 Tab by mouth every day.     • Calcium Carbonate-Vitamin D (CALCIUM + D PO) Take 1 Tab by mouth every day.     • omeprazole (PRILOSEC) 20 MG delayed-release capsule Take 1 Cap by mouth every day. 90 Cap 3   • Psyllium (METAMUCIL FIBER PO) Take  by mouth.     • Wheat Dextrin (BENEFIBER DRINK MIX PO) Take  by mouth.       No current facility-administered medications for this visit.      She  has a past medical history of Atrial fibrillation [I48.91] (4/19/2016); Back pain (6/27/2012); Blood clotting disorder (CMS-HCC) (2012); Cancer (CMS-[facility]); Chronic back pain greater than 3 months duration; Deep vein thrombosis (CMS-[facility]) (3/8/2016); Essential hypertension, benign (6/27/2012); GERD (gastroesophageal reflux disease) (6/27/2012); Hiatus hernia syndrome; Hyperlipidemia;  Hypertension; Impaired fasting glucose (11/2/2017); Mild aortic stenosis (11/2/2017); Other and unspecified hyperlipidemia (6/27/2012); Prediabetes; Protein S deficiency (CMS-HCC) (11/2/2017); Rheumatoid arthritis involving multiple sites with positive rheumatoid factor (CMS-HCC) (03/14/2016); Rheumatoid nodule (CMS-HCC) (7/25/2017); Right bundle branch block (6/27/2012); and Urinary incontinence (11/2/2017).    ROS   Other than what is mentioned in HPI or physical exam, there is no history of headaches, double vision or blurred vision. No temporal tenderness or jaw claudication. No history of cataracts or glaucoma. No trouble swallowing difficulties or sore throats. No history of thyroid disease. No chest complaints including chest pain, cough or sputum production. No shortness of breath. No GI complaints including nausea, vomiting, change in bowel habits, or past peptic ulcer disease. No history of blood in the stools. No urinary complaints including dysuria or frequency. No history of rash including psoriasis. No history of alopecia, photosensitivity, oral ulcerations, Raynaud's phenomena, or swollen joints. No history of gout. No back complaints. No history of low blood counts.       Objective:     Blood pressure 130/80, pulse 68, temperature 36.5 °C (97.7 °F), resp. rate 14, weight 77.1 kg (170 lb), SpO2 97 %. Body mass index is 30.11 kg/m².   Physical Exam:  Constitutional: Alert and oriented X3,.Skin: Warm, dry, good turgor, no rashes in visible areas, multiple areas of resection of skin cancers, and patient has a recent one on her left lower leg that has a Band-Aid over. Eye: Equal, round and reactive, conjunctiva clear, lids normal EOM intact , mild arcus senilis bilaterallyENMT: Lips without lesions, good dentition, no oropharyngeal ulcers, moist buccal mucosa, pinna without deformityNeck: Trachea midline, no masses, no thyromegaly.Lymph:  No cervical lymphadenopathy, no axillary lymphadenopathy, no  supraclavicular lymphadenopathyRespiratory: Unlabored respiratory effort, lungs clear to auscultation, no wheezes, no ronchi.Cardiovascular: Normal S1, S2, patient has a 3/6 systolic ejection murmur heard both at the right and left sternal borders, no edema.Abdomen: Soft, non-tender, no masses, no hepatosplenomegaly.Psych: Alert and oriented x3, normal affect and mood.Neuro: Cranial nerves 2-12 are grossly intact, no loss of sensation LEExt:no joint laxity noted in bilateral arms, no joint laxity noted in bilateral legs, gait without antalgia and without foot drop, there is evidence of Heberden's nodes most DIP joints of both hands but no evidence of swelling today, no sausage digits, no synovitis. No flexure contractures of the elbows, tenderness to palpation bilateral shoulders but with full range of motion. , no christin swan-neck or boutonniere deformities, no ulnar deviation, no flexure contractures in the elbows, knees with crepitus bilaterally but no effusions, no Achilles tendon inflammation. Patient does have evidence of multiple areas of Heberden's nodes and also Ana node on the right fourth finger, also bunions bilateral great toe MTP joints.    Lab Results   Component Value Date/Time    QNTTBGOLD Negative 06/29/2016 02:03 PM     Lab Results   Component Value Date/Time    HEPBCORIGM Negative 06/29/2016 02:03 PM    HEPBSAG Negative 06/29/2016 02:03 PM     Lab Results   Component Value Date/Time    SODIUM 141 02/26/2018 11:55 AM    POTASSIUM 4.3 02/26/2018 11:55 AM    CHLORIDE 106 02/26/2018 11:55 AM    CO2 29 02/26/2018 11:55 AM    GLUCOSE 98 02/26/2018 11:55 AM    BUN 28 (H) 02/26/2018 11:55 AM    CREATININE 1.17 02/26/2018 11:55 AM      Lab Results   Component Value Date/Time    WBC 6.4 02/26/2018 11:55 AM    RBC 4.55 02/26/2018 11:55 AM    HEMOGLOBIN 14.0 02/26/2018 11:55 AM    HEMATOCRIT 43.8 02/26/2018 11:55 AM    MCV 96.3 02/26/2018 11:55 AM    MCH 30.8 02/26/2018 11:55 AM    MCHC 32.0 (L)  02/26/2018 11:55 AM    MPV 11.7 02/26/2018 11:55 AM    NEUTSPOLYS 67.40 02/26/2018 11:55 AM    LYMPHOCYTES 20.30 (L) 02/26/2018 11:55 AM    MONOCYTES 8.80 02/26/2018 11:55 AM    EOSINOPHILS 2.50 02/26/2018 11:55 AM    BASOPHILS 0.80 02/26/2018 11:55 AM      Lab Results   Component Value Date/Time    CALCIUM 8.7 02/26/2018 11:55 AM    ASTSGOT 21 02/26/2018 11:55 AM    ALTSGPT 17 02/26/2018 11:55 AM    ALKPHOSPHAT 52 02/26/2018 11:55 AM    TBILIRUBIN 0.7 02/26/2018 11:55 AM    ALBUMIN 4.1 02/26/2018 11:55 AM    TOTPROTEIN 6.6 02/26/2018 11:55 AM     Lab Results   Component Value Date/Time    URICACID 4.5 06/29/2016 02:03 PM    RHEUMFACTN <10 06/29/2016 02:03 PM    CCPANTIBODY 4 06/29/2016 02:03 PM     Lab Results   Component Value Date/Time    SEDRATEWES 10 02/26/2018 11:55 AM     Lab Results   Component Value Date/Time    HBA1C 5.5 05/16/2016 07:50 AM     Lab Results   Component Value Date/Time    CPKTOTAL 61 06/29/2016 02:03 PM     Results for orders placed during the hospital encounter of 03/18/16   DX-JOINT SURVEY-HANDS SINGLE VIEW    Impression Multiple bilateral sites of osteoarthritis     Results for orders placed during the hospital encounter of 03/18/16   DX-JOINT SURVEY-FEET SINGLE VIEW    Impression 1.  Bilateral midfoot and forefoot osteoarthritis    2.  Bilateral bunion    3.  Prior fusion across the right 2nd proximal interphalangeal joint    4.  Foreign body or opaque material between 1st and 2nd phalanges     Results for orders placed during the hospital encounter of 03/18/16   DS-BONE DENSITY STUDY (DEXA)    Impression According to the World Health Organization classification, bone mineral density of this patient is osteopenia with increased risk of fracture.        10-year Probability of Fracture:  Major Osteoporotic     14.3%  Hip     3.7%  Population      [place] ()    Based on left femur neck BMD          INTERPRETING LOCATION:  [address] [place] [address], [id number]     Results for orders placed  during the hospital encounter of 01/14/09   DX-KNEE COMPLETE 4+    Impression IMPRESSION:     MILD PATELLOFEMORAL AND MEDIAL FEMOROTIBIAL COMPARTMENT DEGENERATIVE   OSTEOARTHROSIS.        GE[facility]     Read By ALEX WISE MD on Jan 14 2009 10:01AM  : DANA Transcription Date: Jovi 15 2009  8:11AM  THIS DOCUMENT HAS BEEN ELECTRONICALLY SIGNED BY: ALEX WISE MD on   Jan 16 2009  1:32PM        Results for orders placed during the hospital encounter of 01/14/09   DX-SHOULDER 2+    Impression IMPRESSION:     NORMAL RADIOGRAPHS OF THE LEFT SHOULDER WITH NOTE MADE OF MINIMAL   DEGENERATIVE OSTEOARTHROSIS OF THE GLENOHUMERAL JOINT WITH MINIMAL   SPURRING.           Results for orders placed during the hospital encounter of 05/12/17   MR-LUMBAR SPINE-W/O    Impression 1.  Mild spinal stenosis at L3-4    2.  Multilevel foraminal stenoses describing the findings section    3.  Degenerative subluxations at T12-L1, L1-2, and L4-5    4.  Mild edematous endplate change adjacent to T12-L1    5.  Incidental hemangioma within the left side of L4    6.  8 mm focus of marrow placement within L2. Differential diagnosis includes atypical hemangioma or a metastasis. Significance of this finding depends almost exclusively on whether there is a history of known malignancy.     DX-CERVICAL SPINE-2 OR 3 VIEWS    Impression IMPRESSION:     DEGENERATIVE DISC AND FACET ARTHROPATHY C5-6, C6-7, AND DEGENERATIVE   FACET ARTHROPATHY AT C7-T1.           Assessment and Plan:     1. Rheumatoid arthritis (CMS-HCC)  Patient currently is clinically stable, serologically negative and radiographically negative, consider possibly tapering down XELJANZ, discussion with patient today and we will ponder  - alendronate (FOSAMAX) 70 MG Tab; Take 1 Tab by mouth every 7 days.  Dispense: 12 Tab; Refill: 3  - DS-BONE DENSITY STUDY (DEXA); Future    2. Senile osteoporosis  Last DEXA scan March 2016, FRAX indicates osteoporosis and hip, start  Fosamax 70 mg by mouth every week  Patient also needs repeat DEXA will schedule that   continue calcium 1200 mg by mouth daily and vitamin D about 1000 units by mouth daily and magnesium 400 mg by mouth daily  - alendronate (FOSAMAX) 70 MG Tab; Take 1 Tab by mouth every 7 days.  Dispense: 12 Tab; Refill: 3  - DS-BONE DENSITY STUDY (DEXA); Future    3. Essential hypertension, benign  May impact the type of medications we can use for this patient's arthritis. We will have to keep this under advisement.  - alendronate (FOSAMAX) 70 MG Tab; Take 1 Tab by mouth every 7 days.  Dispense: 12 Tab; Refill: 3    4. Protein S deficiency (CMS-HCC)  On chronic anticoagulation  This will impact with kind of medications we can use for this patient's arthritis, NSAIDs are contraindicated because of increased risk of bleeding while on chronic anticoagulation    5. Chronic anticoagulation  This will impact with kind of medications we can use for this patient's arthritis, NSAIDs are contraindicated because of increased risk of bleeding while on chronic anticoagulation    6. Impaired fasting glucose  High blood sugar can exacerbate inflammatory state of patient's arthritis, discussed with patient the need to monitor and control blood sugars, patient states understanding      Followup: Return in about 6 months (around 9/1/2018). or sooner prn    Patient was seen 30 minutes face-to-face of which more than 50% of the time was spent counseling the patient (excluding time for procedures)  regarding  rheumatological condition and care. Therapy was discussed in detail.    Please note that this dictation was created using voice recognition software. I have made every reasonable attempt to correct obvious errors, but I expect that there are errors of grammar and possibly content that I did not discover before finalizing the note.             Addendum-patient stated today and visit that she is having more problems with recurrence of skin cancers  including melanoma, advised patient to stop the XELJANZ as XELJANZ has a black box warning increasing the risk of skin cancers, patient states understanding and will stop the XELJANZ.

## 2018-03-01 NOTE — LETTER
[facility]   [address], [address]  [place] [address]  Phone: [phone]  Fax: [phone]              Encounter Date: 3/1/2018    Dear Dr. Eaton ref. provider found,    It was a pleasure seeing your patient, Marry Mathur, on 3/1/2018. Diagnoses of Rheumatoid nodule (CMS-HCC), Senile osteoporosis, Essential hypertension, benign, Protein S deficiency (CMS-HCC), Chronic anticoagulation, and Impaired fasting glucose were pertinent to this visit.     Please find attached progress note which includes the history I obtained from Ms. Mathur, my physical examination findings, my impression and recommendations.      Once again, it was a pleasure participating in your patient's care.  Please feel free to contact me if you have any questions or if I can be of any further assistance to your patients.      Sincerely,    Deanna Escoto M.D.  Electronically Signed          PROGRESS NOTE:  No notes on file

## 2018-03-05 ENCOUNTER — APPOINTMENT (OUTPATIENT)
Dept: RHEUMATOLOGY | Facility: PHYSICIAN GROUP | Age: 83
End: 2018-03-05
Payer: MEDICARE

## 2018-03-14 ENCOUNTER — APPOINTMENT (RX ONLY)
Dept: URBAN - METROPOLITAN AREA CLINIC 36 | Facility: CLINIC | Age: 83
Setting detail: DERMATOLOGY
End: 2018-03-14

## 2018-03-14 DIAGNOSIS — L57.8 OTHER SKIN CHANGES DUE TO CHRONIC EXPOSURE TO NONIONIZING RADIATION: ICD-10-CM

## 2018-03-14 PROBLEM — D03.72 MELANOMA IN SITU OF LEFT LOWER LIMB, INCLUDING HIP: Status: ACTIVE | Noted: 2018-03-14

## 2018-03-14 PROCEDURE — 17314 MOHS ADDL STAGE T/A/L: CPT

## 2018-03-14 PROCEDURE — 15271 SKIN SUB GRAFT TRNK/ARM/LEG: CPT

## 2018-03-14 PROCEDURE — 88342 IMHCHEM/IMCYTCHM 1ST ANTB: CPT | Mod: 59

## 2018-03-14 PROCEDURE — ? MOHS SURGERY

## 2018-03-14 PROCEDURE — ? COUNSELING

## 2018-03-14 PROCEDURE — 17313 MOHS 1 STAGE T/A/L: CPT

## 2018-03-14 PROCEDURE — 99212 OFFICE O/P EST SF 10 MIN: CPT | Mod: 25

## 2018-03-14 NOTE — PROCEDURE: MOHS SURGERY
Quadrant Reporting?: no
Rhombic Flap Text: The defect edges were debeveled with a #15 scalpel blade.  Given the location of the defect and the proximity to free margins a rhombic flap was deemed most appropriate.  Using a sterile surgical marker, an appropriate rhombic flap was drawn incorporating the defect.    The area thus outlined was incised deep to adipose tissue with a #15 scalpel blade.  The skin margins were undermined to an appropriate distance in all directions utilizing iris scissors.
Closure 4 Information: This tab is for additional flaps and grafts above and beyond our usual structured repairs.  Please note if you enter information here it will not currently bill and you will need to add the billing information manually.
Island Pedicle Flap Text: The defect edges were debeveled with a #15 scalpel blade.  Given the location of the defect, shape of the defect and the proximity to free margins an island pedicle advancement flap was deemed most appropriate.  Using a sterile surgical marker, an appropriate advancement flap was drawn incorporating the defect, outlining the appropriate donor tissue and placing the expected incisions within the relaxed skin tension lines where possible.    The area thus outlined was incised deep to adipose tissue with a #15 scalpel blade.  The skin margins were undermined to an appropriate distance in all directions around the primary defect and laterally outward around the island pedicle utilizing iris scissors.  There was minimal undermining beneath the pedicle flap.
Alternatives Discussed Intro (Do Not Add Period): I discussed alternative treatments to Mohs surgery and specifically discussed the risks and benefits of
Lazy S Complex Repair Preamble Text (Leave Blank If You Do Not Want): Extensive wide undermining was performed at least 2 cm in all directions.
Burow's Advancement Flap Text: The defect edges were debeveled with a #15 scalpel blade.  Given the location of the defect and the proximity to free margins a Burow's advancement flap was deemed most appropriate.  Using a sterile surgical marker, the appropriate advancement flap was drawn incorporating the defect and placing the expected incisions within the relaxed skin tension lines where possible.    The area thus outlined was incised deep to adipose tissue with a #15 scalpel blade.  The skin margins were undermined to an appropriate distance in all directions utilizing iris scissors.
Unique Flap 3 Name: Mercedes Flap
Graft Basting Suture (Optional): 5-0 Fast Absorbing Gut
Repair Anesthesia Type: 1% lidocaine with 1:100,000 epinephrine and 408mcg clindamycin/ml and a 1:10 solution of 8.4% sodium bicarbonate
Body Location Override (Optional - Billing Will Still Be Based On Selected Body Map Location If Applicable): left medial distal pretibial
Stage 8: Additional Anesthesia Type: 1% lidocaine with epinephrine
Stage 11: Additional Anesthesia Volume In Cc: 0
No Residual Tumor Seen Histology Text: There were no malignant cells seen in the sections examined.
Alar Island Pedicle Flap Text: The defect edges were debeveled with a #15 scalpel blade.  Given the location of the defect, shape of the defect and the proximity to the alar rim an island pedicle advancement flap was deemed most appropriate.  Using a sterile surgical marker, an appropriate advancement flap was drawn incorporating the defect, outlining the appropriate donor tissue and placing the expected incisions within the nasal ala running parallel to the alar rim. The area thus outlined was incised with a #15 scalpel blade.  The skin margins were undermined minimally to an appropriate distance in all directions around the primary defect and laterally outward around the island pedicle utilizing iris scissors.  There was minimal undermining beneath the pedicle flap.
Posterior Auricular Interpolation Flap Text: A decision was made to reconstruct the defect utilizing an interpolation axial flap and a staged reconstruction.  A telfa template was made of the defect.  This telfa template was then used to outline the posterior auricular interpolation flap.  The donor area for the pedicle flap was then injected with anesthesia.  The flap was excised through the skin and subcutaneous tissue down to the layer of the underlying musculature.  The pedicle flap was carefully excised within this deep plane to maintain its blood supply.  The edges of the donor site were undermined.   The donor site was closed in a primary fashion.  The pedicle was then rotated into position and sutured.  Once the tube was sutured into place, adequate blood supply was confirmed with blanching and refill.  The pedicle was then wrapped with xeroform gauze and dressed appropriately with a telfa and gauze bandage to ensure continued blood supply and protect the attached pedicle.
Cheiloplasty (Less Than 50%) Text: A decision was made to reconstruct the defect with a  cheiloplasty.  The defect was undermined extensively.  Additional obicularis oris muscle was excised with a 15 blade scalpel.  The defect was converted into a full thickness wedge, of less than 50% of the vertical height of the lip, to facilite a better cosmetic result.  Small vessels were then tied off with 5-0 monocyrl. The obicularis oris, superficial fascia, adipose and dermis were then reapproximated.  After the deeper layers were approximated the epidermis was reapproximated with particular care given to realign the vermilion border.
Unique Flap 2 Text: A decision was made to reconstruct the defect utilizing a Peng Flap (Bilateral Advancement Rotation Flap). Given the location of the defect and the proximity to free margins, this flap was deemed most appropriate.  Using a sterile surgical marker, the appropriate rotation flaps were drawn incorporating the defect and placing the expected incisions within the relaxed skin tension lines where possible.    The area thus outlined was incised deep to adipose tissue with a #15 scalpel blade.  The skin margins were undermined to an appropriate distance in all directions utilizing iris scissors.
Consent (Near Eyelid Margin)/Introductory Paragraph: The rationale for Mohs was explained to the patient and consent was obtained. The risks, benefits and alternatives to therapy were discussed in detail. Specifically, the risks of ectropion or eyelid deformity, infection, scarring, bleeding, prolonged wound healing, incomplete removal, allergy to anesthesia, nerve injury and recurrence were addressed. Prior to the procedure, the treatment site was clearly identified and confirmed by the patient. All components of Universal Protocol/PAUSE Rule completed.
Xenograft Text: The defect edges were debeveled with a #15 scalpel blade.  Given the location of the defect, shape of the defect and the proximity to free margins a xenograft was deemed most appropriate.  The graft was then trimmed to fit the size of the defect.  The graft was then placed in the primary defect and oriented appropriately.
Bilateral Helical Rim Advancement Flap Text: The defect edges were debeveled with a #15 blade scalpel.  Given the location of the defect and the proximity to free margins (helical rim) a bilateral helical rim advancement flap was deemed most appropriate.  Using a sterile surgical marker, the appropriate advancement flaps were drawn incorporating the defect and placing the expected incisions between the helical rim and antihelix where possible.  The area thus outlined was incised through and through with a #15 scalpel blade.  With a skin hook and iris scissors, the flaps were gently and sharply undermined and freed up.
Area H Indication Text: Tumors in this location are included in Area H (eyelids, eyebrows, nose, lips, chin, ear, pre-auricular, post-auricular, temple, genitalia, hands, feet, ankles and areola).  Tissue conservation is critical in these anatomic locations.
Epidermal Closure Graft Donor Site (Optional): simple interrupted
Unna Boot Text: An Unna boot was placed to help immobilize the limb and facilitate more rapid healing.
H Plasty Text: Given the location of the defect, shape of the defect and the proximity to free margins a H-plasty was deemed most appropriate for repair.  Using a sterile surgical marker, the appropriate advancement arms of the H-plasty were drawn incorporating the defect and placing the expected incisions within the relaxed skin tension lines where possible. The area thus outlined was incised deep to adipose tissue with a #15 scalpel blade. The skin margins were undermined to an appropriate distance in all directions utilizing iris scissors.  The opposing advancement arms were then advanced into place in opposite direction and anchored with interrupted buried subcutaneous sutures.
Keystone Flap Text: The defect edges were debeveled with a #15 scalpel blade.  Given the location of the defect, shape of the defect a keystone flap was deemed most appropriate.  Using a sterile surgical marker, an appropriate keystone flap was drawn incorporating the defect, outlining the appropriate donor tissue and placing the expected incisions within the relaxed skin tension lines where possible. The area thus outlined was incised deep to adipose tissue with a #15 scalpel blade.  The skin margins were undermined to an appropriate distance in all directions around the primary defect and laterally outward around the flap utilizing iris scissors.
Surgical Defect Length In Cm (Optional): 2.5
Modified Advancement Flap Text: The defect edges were debeveled with a #15 scalpel blade.  Given the location of the defect, shape of the defect and the proximity to free margins a modified advancement flap was deemed most appropriate.  Using a sterile surgical marker, an appropriate advancement flap was drawn incorporating the defect and placing the expected incisions within the relaxed skin tension lines where possible.    The area thus outlined was incised deep to adipose tissue with a #15 scalpel blade.  The skin margins were undermined to an appropriate distance in all directions utilizing iris scissors.
Subsequent Stages Histo Method Verbiage: Using a similar technique to that described above, a thin layer of tissue was removed from all areas where tumor was visible on the previous stage.  The tissue was again oriented, mapped, dyed, and processed as above.
Surgeon Performing Repair (Optional): Melly
Bi-Rhombic Flap Text: The defect edges were debeveled with a #15 scalpel blade.  Given the location of the defect and the proximity to free margins a bi-rhombic flap was deemed most appropriate.  Using a sterile surgical marker, an appropriate rhombic flap was drawn incorporating the defect. The area thus outlined was incised deep to adipose tissue with a #15 scalpel blade.  The skin margins were undermined to an appropriate distance in all directions utilizing iris scissors.
Secondary Intention Text (Leave Blank If You Do Not Want): The defect will heal with secondary intention.
Composite Graft Text: The defect edges were debeveled with a #15 scalpel blade.  Given the location of the defect, shape of the defect, the proximity to free margins and the fact the defect was full thickness a composite graft was deemed most appropriate.  The defect was outline and then transferred to the donor site.  A full thickness graft was then excised from the donor site. The graft was then placed in the primary defect, oriented appropriately and then sutured into place.  The secondary defect was then repaired using a primary closure.
Integrate Histology Into Note?: Yes
Tarsorrhaphy Text: A tarsorrhaphy was performed using Frost sutures.
Graft Donor Site Epidermal Sutures (Optional): 5-0 Ethibond
Detail Level: Detailed
Referred To Otolaryngology For Closure Text (Leave Blank If You Do Not Want): After obtaining clear surgical margins the patient was sent to otolaryngology for surgical repair.  The patient understands they will receive post-surgical care and follow-up from the referring physician's office.
Consent (Marginal Mandibular)/Introductory Paragraph: The rationale for Mohs was explained to the patient and consent was obtained. The risks, benefits and alternatives to therapy were discussed in detail. Specifically, the risks of damage to the marginal mandibular branch of the facial nerve, infection, scarring, bleeding, prolonged wound healing, incomplete removal, allergy to anesthesia, and recurrence were addressed. Prior to the procedure, the treatment site was clearly identified and confirmed by the patient. All components of Universal Protocol/PAUSE Rule completed.
Surgical Defect Width In Cm (Optional): 2.8
Interpolation Flap Text: A decision was made to reconstruct the defect utilizing an interpolation axial flap and a staged reconstruction.  A telfa template was made of the defect.  This telfa template was then used to outline the interpolation flap.  The donor area for the pedicle flap was then injected with anesthesia.  The flap was excised through the skin and subcutaneous tissue down to the layer of the underlying musculature.  The interpolation flap was carefully excised within this deep plane to maintain its blood supply.  The edges of the donor site were undermined.   The donor site was closed in a primary fashion.  The pedicle was then rotated into position and sutured.  Once the tube was sutured into place, adequate blood supply was confirmed with blanching and refill.  The pedicle was then wrapped with xeroform gauze and dressed appropriately with a telfa and gauze bandage to ensure continued blood supply and protect the attached pedicle.
Consent (Nose)/Introductory Paragraph: The rationale for Mohs was explained to the patient and consent was obtained. The risks, benefits and alternatives to therapy were discussed in detail. Specifically, the risks of nasal deformity, changes in the flow of air through the nose, infection, scarring, bleeding, prolonged wound healing, incomplete removal, allergy to anesthesia, nerve injury and recurrence were addressed. Prior to the procedure, the treatment site was clearly identified and confirmed by the patient. All components of Universal Protocol/PAUSE Rule completed.
Surgical Defect Length In Cm (Optional): 2.3
Referred To Asc For Closure Text (Leave Blank If You Do Not Want): After obtaining clear surgical margins the patient was sent to an ASC for surgical repair.  The patient understands they will receive post-surgical care and follow-up from the ASC physician.
Number Of Stages: 4
Mohs Method Verbiage: An incision at a 45 degree angle following the standard Mohs approach was done and the specimen was harvested as a microscopic controlled layer.
Bcc Infiltrative Histology Text: There were numerous aggregates of basaloid cells demonstrating an infiltrative pattern.
Anesthesia Volume In Cc: 6
Mohs Case Number: [id number]
S Plasty Text: Given the location and shape of the defect, and the orientation of relaxed skin tension lines, an S-plasty was deemed most appropriate for repair.  Using a sterile surgical marker, the appropriate outline of the S-plasty was drawn, incorporating the defect and placing the expected incisions within the relaxed skin tension lines where possible.  The area thus outlined was incised deep to adipose tissue with a #15 scalpel blade.  The skin margins were undermined to an appropriate distance in all directions utilizing iris scissors. The skin flaps were advanced over the defect.  The opposing margins were then approximated with interrupted buried subcutaneous sutures.
Donor Site Anesthesia Type: same as repair anesthesia
Purse String (Intermediate) Text: Given the location of the defect and the characteristics of the surrounding skin a purse string intermediate closure was deemed most appropriate.  Undermining was performed circumfirentially around the surgical defect.  A purse string suture was then placed and tightened.
Stage 1: Number Of Blocks?: 1
Split-Thickness Skin Graft Text: The defect edges were debeveled with a #15 scalpel blade.  Given the location of the defect, shape of the defect and the proximity to free margins a split thickness skin graft was deemed most appropriate.  Using a sterile surgical marker, the primary defect shape was transferred to the donor site. The split thickness graft was then harvested.  The skin graft was then placed in the primary defect and oriented appropriately.
Mauc Instructions: By selecting yes to the question below the MAUC number will be added into the note.  This will be calculated automatically based on the diagnosis chosen, the size entered, the body zone selected (H,M,L) and the specific indications you chose. You will also have the option to override the Mohs AUC if you disagree with the automatically calculated number and this option is found in the Case Summary tab.
Hatchet Flap Text: The defect edges were debeveled with a #15 scalpel blade.  Given the location of the defect, shape of the defect and the proximity to free margins a hatchet flap based from the glabella was deemed most appropriate.  Using a sterile surgical marker, an appropriate glabellar hatchet flap was drawn incorporating the defect and placing the expected incisions within the relaxed skin tension lines where possible.    The area thus outlined was incised deep to adipose tissue with a #15 scalpel blade.  The skin margins were undermined to an appropriate distance in all directions utilizing iris scissors.
Suture Removal: 7 days
Muscle Hinge Flap Text: The defect edges were debeveled with a #15 scalpel blade.  Given the size, depth and location of the defect and the proximity to free margins a muscle hinge flap was deemed most appropriate.  Using a sterile surgical marker, an appropriate hinge flap was drawn incorporating the defect. The area thus outlined was incised with a #15 scalpel blade.  The skin margins were undermined to an appropriate distance in all directions utilizing iris scissors.
Trilobed Flap Text: The defect edges were debeveled with a #15 scalpel blade.  Given the location of the defect and the proximity to free margins a trilobed flap was deemed most appropriate.  Using a sterile surgical marker, an appropriate trilobed flap drawn around the defect.    The area thus outlined was incised deep to adipose tissue with a #15 scalpel blade.  The skin margins were undermined to an appropriate distance in all directions utilizing iris scissors.
Consent (Temporal Branch)/Introductory Paragraph: The rationale for Mohs was explained to the patient and consent was obtained. The risks, benefits and alternatives to therapy were discussed in detail. Specifically, the risks of damage to the temporal branch of the facial nerve, infection, scarring, bleeding, prolonged wound healing, incomplete removal, allergy to anesthesia, and recurrence were addressed. Prior to the procedure, the treatment site was clearly identified and confirmed by the patient. All components of Universal Protocol/PAUSE Rule completed.
Referred To Mid-Level For Closure Text (Leave Blank If You Do Not Want): After obtaining clear surgical margins the patient was sent to a mid-level provider for surgical repair.  The patient understands they will receive post-surgical care and follow-up from the mid-level provider.
Epidermal Sutures: 5-0 Ethilon
Double Island Pedicle Flap Text: The defect edges were debeveled with a #15 scalpel blade.  Given the location of the defect, shape of the defect and the proximity to free margins a double island pedicle advancement flap was deemed most appropriate.  Using a sterile surgical marker, an appropriate advancement flap was drawn incorporating the defect, outlining the appropriate donor tissue and placing the expected incisions within the relaxed skin tension lines where possible.    The area thus outlined was incised deep to adipose tissue with a #15 scalpel blade.  The skin margins were undermined to an appropriate distance in all directions around the primary defect and laterally outward around the island pedicle utilizing iris scissors.  There was minimal undermining beneath the pedicle flap.
Previous Accession (Optional): [id number]
O-L Flap Text: The defect edges were debeveled with a #15 scalpel blade.  Given the location of the defect, shape of the defect and the proximity to free margins an O-L flap was deemed most appropriate.  Using a sterile surgical marker, an appropriate advancement flap was drawn incorporating the defect and placing the expected incisions within the relaxed skin tension lines where possible.    The area thus outlined was incised deep to adipose tissue with a #15 scalpel blade.  The skin margins were undermined to an appropriate distance in all directions utilizing iris scissors.
Deep Sutures: 5-0 Vicryl
Bilobed Flap Text: The defect edges were debeveled with a #15 scalpel blade.  Given the location of the defect and the proximity to free margins a bilobe flap was deemed most appropriate.  Using a sterile surgical marker, an appropriate bilobe flap drawn around the defect.    The area thus outlined was incised deep to adipose tissue with a #15 scalpel blade.  The skin margins were undermined to an appropriate distance in all directions utilizing iris scissors.
Ear Wedge Repair Text: A wedge excision was completed by carrying down an excision through the full thickness of the ear and cartilage with an inward facing Burow's triangle. The wound was then closed in a layered fashion.
Unique Flap 1 Name: Myocutaneous Island pedicle Flap
Unique Flap 4 Text: The defect edges were debeveled with a #15 scalpel blade.  Given the location of the defect and the proximity to free margins a Banner transposition flap was deemed most appropriate.  Using a sterile surgical marker, an appropriate Banner transposition flap was drawn incorporating the defect.    The area thus outlined was incised deep to adipose tissue with a #15 scalpel blade.  The skin margins were undermined to an appropriate distance in all directions utilizing iris scissors.
O-Z Plasty Text: The defect edges were debeveled with a #15 scalpel blade.  Given the location of the defect, shape of the defect and the proximity to free margins an O-Z plasty (double transposition flap) was deemed most appropriate.  Using a sterile surgical marker, the appropriate transposition flaps were drawn incorporating the defect and placing the expected incisions within the relaxed skin tension lines where possible.    The area thus outlined was incised deep to adipose tissue with a #15 scalpel blade.  The skin margins were undermined to an appropriate distance in all directions utilizing iris scissors.  Hemostasis was achieved with electrocautery.  The flaps were then transposed into place, one clockwise and the other counterclockwise, and anchored with interrupted buried subcutaneous sutures.
Advancement Flap (Single) Text: The defect edges were debeveled with a #15 scalpel blade.  Given the location of the defect and the proximity to free margins a single advancement flap was deemed most appropriate.  Using a sterile surgical marker, an appropriate advancement flap was drawn incorporating the defect and placing the expected incisions within the relaxed skin tension lines where possible.    The area thus outlined was incised deep to adipose tissue with a #15 scalpel blade.  The skin margins were undermined to an appropriate distance in all directions utilizing iris scissors.
Mastoid Interpolation Flap Text: A decision was made to reconstruct the defect utilizing an interpolation axial flap and a staged reconstruction.  A telfa template was made of the defect.  This telfa template was then used to outline the mastoid interpolation flap.  The donor area for the pedicle flap was then injected with anesthesia.  The flap was excised through the skin and subcutaneous tissue down to the layer of the underlying musculature.  The pedicle flap was carefully excised within this deep plane to maintain its blood supply.  The edges of the donor site were undermined.   The donor site was closed in a primary fashion.  The pedicle was then rotated into position and sutured.  Once the tube was sutured into place, adequate blood supply was confirmed with blanching and refill.  The pedicle was then wrapped with xeroform gauze and dressed appropriately with a telfa and gauze bandage to ensure continued blood supply and protect the attached pedicle.
Skin Substitute: EZ DERM
Repair Anesthesia Method: local infiltration
No Repair - Repaired With Adjacent Surgical Defect Text (Leave Blank If You Do Not Want): After obtaining clear surgical margins the defect was repaired concurrently with another surgical defect which was in close approximation.
Spiral Flap Text: The defect edges were debeveled with a #15 scalpel blade.  Given the location of the defect, shape of the defect and the proximity to free margins a spiral flap was deemed most appropriate.  Using a sterile surgical marker, an appropriate rotation flap was drawn incorporating the defect and placing the expected incisions within the relaxed skin tension lines where possible. The area thus outlined was incised deep to adipose tissue with a #15 scalpel blade.  The skin margins were undermined to an appropriate distance in all directions utilizing iris scissors.
Cheek-To-Nose Interpolation Flap Text: A decision was made to reconstruct the defect utilizing an interpolation axial flap and a staged reconstruction.  A telfa template was made of the defect.  This telfa template was then used to outline the Cheek-To-Nose Interpolation flap.  The donor area for the pedicle flap was then injected with anesthesia.  The flap was excised through the skin and subcutaneous tissue down to the layer of the underlying musculature.  The interpolation flap was carefully excised within this deep plane to maintain its blood supply.  The edges of the donor site were undermined.   The donor site was closed in a primary fashion.  The pedicle was then rotated into position and sutured.  Once the tube was sutured into place, adequate blood supply was confirmed with blanching and refill.  The pedicle was then wrapped with xeroform gauze and dressed appropriately with a telfa and gauze bandage to ensure continued blood supply and protect the attached pedicle.
Mohs Histo Method Verbiage: Each section was then chromacoded and processed in the Mohs lab using the Mohs protocol and submitted for frozen section.
Consent 2/Introductory Paragraph: Mohs surgery was explained to the patient and consent was obtained. The risks, benefits and alternatives to therapy were discussed in detail. Specifically, the risks of infection, scarring, bleeding, prolonged wound healing, incomplete removal, allergy to anesthesia, nerve injury and recurrence were addressed. Prior to the procedure, the treatment site was clearly identified and confirmed by the patient. All components of Universal Protocol/PAUSE Rule completed.
Post-Care Instructions: I reviewed with the patient in detail post-care instructions. Patient is not to engage in any heavy lifting, exercise, or swimming for the next 14 days. Should the patient develop any fevers, chills, bleeding, severe pain patient will contact the office immediately.
Star Wedge Flap Text: The defect edges were debeveled with a #15 scalpel blade.  Given the location of the defect, shape of the defect and the proximity to free margins a star wedge flap was deemed most appropriate.  Using a sterile surgical marker, an appropriate rotation flap was drawn incorporating the defect and placing the expected incisions within the relaxed skin tension lines where possible. The area thus outlined was incised deep to adipose tissue with a #15 scalpel blade.  The skin margins were undermined to an appropriate distance in all directions utilizing iris scissors.
Postop Diagnosis: same
Z Plasty Text: The lesion was extirpated to the level of the fat with a #15 scalpel blade.  Given the location of the defect, shape of the defect and the proximity to free margins a Z-plasty was deemed most appropriate for repair.  Using a sterile surgical marker, the appropriate transposition arms of the Z-plasty were drawn incorporating the defect and placing the expected incisions within the relaxed skin tension lines where possible.    The area thus outlined was incised deep to adipose tissue with a #15 scalpel blade.  The skin margins were undermined to an appropriate distance in all directions utilizing iris scissors.  The opposing transposition arms were then transposed into place in opposite direction and anchored with interrupted buried subcutaneous sutures.
Partial Purse String (Intermediate) Text: Given the location of the defect and the characteristics of the surrounding skin an intermediate purse string closure was deemed most appropriate.  Undermining was performed circumfirentially around the surgical defect.  A purse string suture was then placed and tightened. Wound tension only allowed a partial closure of the circular defect.
Complex Repair And Flap Additional Text (Will Appearing After The Standard Complex Repair Text): The complex repair was not sufficient to completely close the primary defect. The remaining additional defect was repaired with the flap mentioned below.
Graft Type: Skin Substitute
Consent (Lip)/Introductory Paragraph: The rationale for Mohs was explained to the patient and consent was obtained. The risks, benefits and alternatives to therapy were discussed in detail. Specifically, the risks of lip deformity, changes in the oral aperture, infection, scarring, bleeding, prolonged wound healing, incomplete removal, allergy to anesthesia, nerve injury and recurrence were addressed. Prior to the procedure, the treatment site was clearly identified and confirmed by the patient. All components of Universal Protocol/PAUSE Rule completed.
Advancement-Rotation Flap Text: The defect edges were debeveled with a #15 scalpel blade.  Given the location of the defect, shape of the defect and the proximity to free margins an advancement-rotation flap was deemed most appropriate.  Using a sterile surgical marker, an appropriate flap was drawn incorporating the defect and placing the expected incisions within the relaxed skin tension lines where possible. The area thus outlined was incised deep to adipose tissue with a #15 scalpel blade.  The skin margins were undermined to an appropriate distance in all directions utilizing iris scissors.
Skin Substitute Text: The defect edges were debeveled with a #15 scalpel blade.  Given the location of the defect, shape of the defect and the proximity to free margins a skin substitute graft was deemed most appropriate.  The graft material was trimmed to fit the size of the defect. The graft was then placed in the primary defect and oriented appropriately.
Consent (Ear)/Introductory Paragraph: The rationale for Mohs was explained to the patient and consent was obtained. The risks, benefits and alternatives to therapy were discussed in detail. Specifically, the risks of ear deformity, infection, scarring, bleeding, prolonged wound healing, incomplete removal, allergy to anesthesia, nerve injury and recurrence were addressed. Prior to the procedure, the treatment site was clearly identified and confirmed by the patient. All components of Universal Protocol/PAUSE Rule completed.
Initial Size Of Lesion: 2.1
Special Stains Used Stage 4: Mart-1
Ftsg Text: The defect edges were debeveled with a #15 scalpel blade.  Given the location of the defect, shape of the defect and the proximity to free margins a full thickness skin graft was deemed most appropriate.  Using a sterile surgical marker, the primary defect shape was transferred to the donor site. The area thus outlined was incised deep to adipose tissue with a #15 scalpel blade.  The harvested graft was then trimmed of adipose tissue until only dermis and epidermis was left.  The skin margins of the secondary defect were undermined to an appropriate distance in all directions utilizing iris scissors.  The secondary defect was closed with interrupted buried subcutaneous sutures.  The skin edges were then re-apposed with running  sutures.  The skin graft was then placed in the primary defect and oriented appropriately.
Localized Dermabrasion With Wire Brush Text: The patient was draped in routine manner.  Localized dermabrasion using 3 x 17 mm wire brush was performed in routine manner to papillary dermis. This spot dermabrasion is being performed to complete skin cancer reconstruction. It also will eliminate the other sun damaged precancerous cells that are known to be part of the regional effect of a lifetime's worth of sun exposure. This localized dermabrasion is therapeutic and should not be considered cosmetic in any regard.
Epidermal Autograft Text: The defect edges were debeveled with a #15 scalpel blade.  Given the location of the defect, shape of the defect and the proximity to free margins an epidermal autograft was deemed most appropriate.  Using a sterile surgical marker, the primary defect shape was transferred to the donor site. The epidermal graft was then harvested.  The skin graft was then placed in the primary defect and oriented appropriately.
V-Y Plasty Text: The defect edges were debeveled with a #15 scalpel blade.  Given the location of the defect, shape of the defect and the proximity to free margins an V-Y advancement flap was deemed most appropriate.  Using a sterile surgical marker, an appropriate advancement flap was drawn incorporating the defect and placing the expected incisions within the relaxed skin tension lines where possible.    The area thus outlined was incised deep to adipose tissue with a #15 scalpel blade.  The skin margins were undermined to an appropriate distance in all directions utilizing iris scissors.
Graft Donor Site Bandage (Optional-Leave Blank If You Don't Want In Note): Aquaphor and telefa placed on wound. Pressure dressing applied to donor site
Crescentic Advancement Flap Text: The defect edges were debeveled with a #15 scalpel blade.  Given the location of the defect and the proximity to free margins a crescentic advancement flap was deemed most appropriate.  Using a sterile surgical marker, the appropriate advancement flap was drawn incorporating the defect and placing the expected incisions within the relaxed skin tension lines where possible.    The area thus outlined was incised deep to adipose tissue with a #15 scalpel blade.  The skin margins were undermined to an appropriate distance in all directions utilizing iris scissors.
Island Pedicle Flap With Canthal Suspension Text: The defect edges were debeveled with a #15 scalpel blade.  Given the location of the defect, shape of the defect and the proximity to free margins an island pedicle advancement flap was deemed most appropriate.  Using a sterile surgical marker, an appropriate advancement flap was drawn incorporating the defect, outlining the appropriate donor tissue and placing the expected incisions within the relaxed skin tension lines where possible. The area thus outlined was incised deep to adipose tissue with a #15 scalpel blade.  The skin margins were undermined to an appropriate distance in all directions around the primary defect and laterally outward around the island pedicle utilizing iris scissors.  There was minimal undermining beneath the pedicle flap. A suspension suture was placed in the canthal tendon to prevent tension and prevent ectropion.
Referred To Plastics For Closure Text (Leave Blank If You Do Not Want): After obtaining clear surgical margins the patient was sent to plastics for surgical repair.  The patient understands they will receive post-surgical care and follow-up from the referring physician's office.
Medical Necessity Statement: Based on my medical judgement, Mohs surgery is the most appropriate treatment for this cancer compared to other treatments.
Paramedian Forehead Flap Text: A decision was made to reconstruct the defect utilizing an interpolation axial flap and a staged reconstruction.  A telfa template was made of the defect.  This telfa template was then used to outline the paramedian forehead pedicle flap.  The donor area for the pedicle flap was then injected with anesthesia.  The flap was excised through the skin and subcutaneous tissue down to the layer of the underlying musculature.  The pedicle flap was carefully excised within this deep plane to maintain its blood supply.  The edges of the donor site were undermined.   The donor site was closed in a primary fashion.  The pedicle was then rotated into position and sutured.  Once the tube was sutured into place, adequate blood supply was confirmed with blanching and refill.  The pedicle was then wrapped with xeroform gauze and dressed appropriately with a telfa and gauze bandage to ensure continued blood supply and protect the attached pedicle.
Home Suture Removal Text: Patient was provided instructions on removing sutures and will remove their sutures at home.  If they have any questions or difficulties they will call the office.
Location Indication Override (Is Already Calculated Based On Selected Body Location): Area L
Wound Care: Aquaphor
Inflammation Suggestive Of Cancer Camouflage Histology Text: There was a dense lymphocytic infiltrate which prevented adequate histologic evaluation of adjacent structures.
Dressing (No Sutures): dry sterile dressing
Primary Defect Length In Cm (Final Defect Size - Required For Flaps/Grafts): 3
Purse String (Simple) Text: Given the location of the defect and the characteristics of the surrounding skin a purse string closure was deemed most appropriate.  Undermining was performed circumfirentially around the surgical defect.  A purse string suture was then placed and tightened.
Area L Indication Text: Tumors in this location are included in Area L (trunk and extremities).  Mohs surgery is indicated for larger tumors, or tumors with aggressive histologic features, in these anatomic locations.
Consent 1/Introductory Paragraph: The rationale for Mohs was explained to the patient and consent was obtained. The risks, benefits and alternatives to therapy were discussed in detail. Specifically, the risks of infection, scarring, bleeding, prolonged wound healing, incomplete removal, allergy to anesthesia, nerve injury and recurrence were addressed. Prior to the procedure, the treatment site was clearly identified and confirmed by the patient. All components of Universal Protocol/PAUSE Rule completed.
Bcc Histology Text: There were numerous aggregates of basaloid cells.
Mucosal Advancement Flap Text: Given the location of the defect, shape of the defect and the proximity to free margins a mucosal advancement flap was deemed most appropriate. Incisions were made with a 15 blade scalpel in the appropriate fashion along the cutaneous vermilion border and the mucosal lip. The remaining actinically damaged mucosal tissue was excised.  The mucosal advancement flap was then elevated to the gingival sulcus with care taken to preserve the neurovascular structures and advanced into the primary defect. Care was taken to ensure that precise realignment of the vermilion border was achieved.
Same Histology In Subsequent Stages Text: The pattern and morphology of the tumor is as described in the first stage.
O-T Advancement Flap Text: The defect edges were debeveled with a #15 scalpel blade.  Given the location of the defect, shape of the defect and the proximity to free margins an O-T advancement flap was deemed most appropriate.  Using a sterile surgical marker, an appropriate advancement flap was drawn incorporating the defect and placing the expected incisions within the relaxed skin tension lines where possible.    The area thus outlined was incised deep to adipose tissue with a #15 scalpel blade.  The skin margins were undermined to an appropriate distance in all directions utilizing iris scissors.
Unique Flap 2 Name: Peng Flap
Cartilage Graft Text: The defect edges were debeveled with a #15 scalpel blade.  Given the location of the defect, shape of the defect, the fact the defect involved a full thickness cartilage defect a cartilage graft was deemed most appropriate.  An appropriate donor site was identified, cleansed, and anesthetized. The cartilage graft was then harvested and transferred to the recipient site, oriented appropriately and then sutured into place.  The secondary defect was then repaired using a primary closure.
Advancement Flap (Double) Text: The defect edges were debeveled with a #15 scalpel blade.  Given the location of the defect and the proximity to free margins a double advancement flap was deemed most appropriate.  Using a sterile surgical marker, the appropriate advancement flaps were drawn incorporating the defect and placing the expected incisions within the relaxed skin tension lines where possible.    The area thus outlined was incised deep to adipose tissue with a #15 scalpel blade.  The skin margins were undermined to an appropriate distance in all directions utilizing iris scissors.
Epidermal Closure: running cuticular
Area M Indication Text: Tumors in this location are included in Area M (cheek, forehead, scalp, neck, jawline and pretibial skin).  Mohs surgery is indicated for tumors in these anatomic locations.
Melolabial Transposition Flap Text: The defect edges were debeveled with a #15 scalpel blade.  Given the location of the defect and the proximity to free margins a melolabial flap was deemed most appropriate.  Using a sterile surgical marker, an appropriate melolabial transposition flap was drawn incorporating the defect.    The area thus outlined was incised deep to adipose tissue with a #15 scalpel blade.  The skin margins were undermined to an appropriate distance in all directions utilizing iris scissors.
Unique Flap 1 Text: A decision was made to reconstruct the defect utilizing a myocutaneous Island pedicle Flap based on the levator labii superioris muscle.  A telfa template was made of the defect.  This telfa template was then used to outline the myocutaneous flap, based along the meilolabial fold.  The donor area for the pedicle flap was then injected with anesthesia.  The flap was excised through the skin and subcutaneous tissue down to the layer of the underlying musculature.  The myocutaneous flap was carefully excised within this deep plane to maintain its blood supply. Based on the muscle. The edges of the donor site were undermined.   The donor site was closed in a primary fashion to the point of transposition.  The pedicle was then transposed into position and sutured.  Once the flap was sutured into place, adequate blood supply was confirmed with blanching and refill.
Estimated Blood Loss (Cc): less than 5 cc
Consent Type: Consent 1 (Standard)
Helical Rim Advancement Flap Text: The defect edges were debeveled with a #15 blade scalpel.  Given the location of the defect and the proximity to free margins (helical rim) a double helical rim advancement flap was deemed most appropriate.  Using a sterile surgical marker, the appropriate advancement flaps were drawn incorporating the defect and placing the expected incisions between the helical rim and antihelix where possible.  The area thus outlined was incised through and through with a #15 scalpel blade.  With a skin hook and iris scissors, the flaps were gently and sharply undermined and freed up.
Eye Protection Verbiage: Before proceeding with the stage, a plastic scleral shield was inserted. The globe was anesthetized with a few drops of 1% lidocaine with 1:100,000 epinephrine. Then, an appropriate sized scleral shield was chosen and coated with lacrilube ointment. The shield was gently inserted and left in place for the duration of each stage. After the stage was completed, the shield was gently removed.
Anesthesia Type: 1% lidocaine with 1:100,000 epinephrine and a 1:10 solution of 8.4% sodium bicarbonate
Lazy S Intermediate Repair Preamble Text (Leave Blank If You Do Not Want): Undermining was performed with blunt dissection.
W Plasty Text: The lesion was extirpated to the level of the fat with a #15 scalpel blade.  Given the location of the defect, shape of the defect and the proximity to free margins a W-plasty was deemed most appropriate for repair.  Using a sterile surgical marker, the appropriate transposition arms of the W-plasty were drawn incorporating the defect and placing the expected incisions within the relaxed skin tension lines where possible.    The area thus outlined was incised deep to adipose tissue with a #15 scalpel blade.  The skin margins were undermined to an appropriate distance in all directions utilizing iris scissors.  The opposing transposition arms were then transposed into place in opposite direction and anchored with interrupted buried subcutaneous sutures.
Consent 3/Introductory Paragraph: I gave the patient a chance to ask questions they had about the procedure.  Following this I explained the Mohs procedure and consent was obtained. The risks, benefits and alternatives to therapy were discussed in detail. Specifically, the risks of infection, scarring, bleeding, prolonged wound healing, incomplete removal, allergy to anesthesia, nerve injury and recurrence were addressed. Prior to the procedure, the treatment site was clearly identified and confirmed by the patient. All components of Universal Protocol/PAUSE Rule completed.
Skin Substitute Units (Will Override Primary Defect Units If Greater Than 0): 5
Repair Performed By Another Provider Text (Leave Blank If You Do Not Want): After obtaining clear surgical margins the defect was repaired by another provider.
Rotation Flap Text: The defect edges were debeveled with a #15 scalpel blade.  Given the location of the defect, shape of the defect and the proximity to free margins a rotation flap was deemed most appropriate.  Using a sterile surgical marker, an appropriate rotation flap was drawn incorporating the defect and placing the expected incisions within the relaxed skin tension lines where possible.    The area thus outlined was incised deep to adipose tissue with a #15 scalpel blade.  The skin margins were undermined to an appropriate distance in all directions utilizing iris scissors.
Stage 4 Override Histology Text: Both H&E and MART-1 stains done on stage 4 sample. MART-1 staining was appropriate with controls appropriate. This confirmed a tumor free plane as seen on H&E.
Referred To Oculoplastics For Closure Text (Leave Blank If You Do Not Want): After obtaining clear surgical margins the patient was sent to oculoplastics for surgical repair.  The patient understands they will receive post-surgical care and follow-up from the referring physician's office.
Dermal Autograft Text: The defect edges were debeveled with a #15 scalpel blade.  Given the location of the defect, shape of the defect and the proximity to free margins a dermal autograft was deemed most appropriate.  Using a sterile surgical marker, the primary defect shape was transferred to the donor site. The area thus outlined was incised deep to adipose tissue with a #15 scalpel blade.  The harvested graft was then trimmed of adipose and epidermal tissue until only dermis was left.  The skin graft was then placed in the primary defect and oriented appropriately.
Surgeon/Pathologist Verbiage (Will Incorporate Name Of Surgeon From Intro If Not Blank): operated in two distinct and integrated capacities as the surgeon and pathologist.
Melolabial Interpolation Flap Text: A decision was made to reconstruct the defect utilizing an interpolation axial flap and a staged reconstruction.  A telfa template was made of the defect.  This telfa template was then used to outline the melolabial interpolation flap.  The donor area for the pedicle flap was then injected with anesthesia.  The flap was excised through the skin and subcutaneous tissue down to the layer of the underlying musculature.  The pedicle flap was carefully excised within this deep plane to maintain its blood supply.  The edges of the donor site were undermined.   The donor site was closed in a primary fashion.  The pedicle was then rotated into position and sutured.  Once the tube was sutured into place, adequate blood supply was confirmed with blanching and refill.  The pedicle was then wrapped with xeroform gauze and dressed appropriately with a telfa and gauze bandage to ensure continued blood supply and protect the attached pedicle.
Transposition Flap Text: The defect edges were debeveled with a #15 scalpel blade.  Given the location of the defect and the proximity to free margins a transposition flap was deemed most appropriate.  Using a sterile surgical marker, an appropriate transposition flap was drawn incorporating the defect.    The area thus outlined was incised deep to adipose tissue with a #15 scalpel blade.  The skin margins were undermined to an appropriate distance in all directions utilizing iris scissors.
Mohs Rapid Report Verbiage: The area of clinically evident tumor was marked with skin marking ink and appropriately hatched.  The initial incision was made following the Mohs approach through the skin.  The specimen was taken to the lab, divided into the necessary number of pieces, chromacoded and processed according to the Mohs protocol.  This was repeated in successive stages until a tumor free defect was achieved.
Full Thickness Lip Wedge Repair (Flap) Text: Given the location of the defect and the proximity to free margins a full thickness wedge repair was deemed most appropriate.  Using a sterile surgical marker, the appropriate repair was drawn incorporating the defect and placing the expected incisions perpendicular to the vermilion border.  The vermilion border was also meticulously outlined to ensure appropriate reapproximation during the repair.  The area thus outlined was incised through and through with a #15 scalpel blade.  The muscularis and dermis were reaproximated with deep sutures following hemostasis. Care was taken to realign the vermilion border before proceeding with the superficial closure.  Once the vermilion was realigned the superfical and mucosal closure was finished.
Hemostasis: Electrocautery
Bilobed Transposition Flap Text: The defect edges were debeveled with a #15 scalpel blade.  Given the location of the defect and the proximity to free margins a bilobed transposition flap was deemed most appropriate.  Using a sterile surgical marker, an appropriate bilobe flap drawn around the defect.    The area thus outlined was incised deep to adipose tissue with a #15 scalpel blade.  The skin margins were undermined to an appropriate distance in all directions utilizing iris scissors.
Closure 2 Information: This tab is for additional flaps and grafts, including complex repair and grafts and complex repair and flaps. You can also specify a different location for the additional defect, if the location is the same you do not need to select a new one. We will insert the automated text for the repair you select below just as we do for solitary flaps and grafts. Please note that at this time if you select a location with a different insurance zone you will need to override the ICD10 and CPT if appropriate.
Partial Purse String (Simple) Text: Given the location of the defect and the characteristics of the surrounding skin a simple purse string closure was deemed most appropriate.  Undermining was performed circumfirentially around the surgical defect.  A purse string suture was then placed and tightened. Wound tension only allowed a partial closure of the circular defect.
Complex Repair And Graft Additional Text (Will Appearing After The Standard Complex Repair Text): The complex repair was not sufficient to completely close the primary defect. The remaining additional defect was repaired with the graft mentioned below.
Consent (Scalp)/Introductory Paragraph: The rationale for Mohs was explained to the patient and consent was obtained. The risks, benefits and alternatives to therapy were discussed in detail. Specifically, the risks of changes in hair growth pattern secondary to repair, infection, scarring, bleeding, prolonged wound healing, incomplete removal, allergy to anesthesia, nerve injury and recurrence were addressed. Prior to the procedure, the treatment site was clearly identified and confirmed by the patient. All components of Universal Protocol/PAUSE Rule completed.
Repair Type: Graft
Tissue Cultured Epidermal Autograft Text: The defect edges were debeveled with a #15 scalpel blade.  Given the location of the defect, shape of the defect and the proximity to free margins a tissue cultured epidermal autograft was deemed most appropriate.  The graft was then trimmed to fit the size of the defect.  The graft was then placed in the primary defect and oriented appropriately.
Unique Flap 4 Name: Banner Flap
A-T Advancement Flap Text: The defect edges were debeveled with a #15 scalpel blade.  Given the location of the defect, shape of the defect and the proximity to free margins an A-T advancement flap was deemed most appropriate.  Using a sterile surgical marker, an appropriate advancement flap was drawn incorporating the defect and placing the expected incisions within the relaxed skin tension lines where possible.    The area thus outlined was incised deep to adipose tissue with a #15 scalpel blade.  The skin margins were undermined to an appropriate distance in all directions utilizing iris scissors.
Cheiloplasty (Complex) Text: A decision was made to reconstruct the defect with a  cheiloplasty.  The defect was undermined extensively.  Additional obicularis oris muscle was excised with a 15 blade scalpel.  The defect was converted into a full thickness wedge to facilite a better cosmetic result.  Small vessels were then tied off with 5-0 monocyrl. The obicularis oris, superficial fascia, adipose and dermis were then reapproximated.  After the deeper layers were approximated the epidermis was reapproximated with particular care given to realign the vermilion border.
Consent (Spinal Accessory)/Introductory Paragraph: The rationale for Mohs was explained to the patient and consent was obtained. The risks, benefits and alternatives to therapy were discussed in detail. Specifically, the risks of damage to the spinal accessory nerve, infection, scarring, bleeding, prolonged wound healing, incomplete removal, allergy to anesthesia, and recurrence were addressed. Prior to the procedure, the treatment site was clearly identified and confirmed by the patient. All components of Universal Protocol/PAUSE Rule completed.
Unique Flap 3 Text: The defect edges were debeveled with a #15 scalpel blade.  Given the location of the defect, shape of the defect and the proximity to free margins a Mercedes (double advancement flap) was deemed most appropriate.  Using a sterile surgical marker, the appropriate transposition flaps were drawn incorporating the defect and placing the expected incisions within the relaxed skin tension lines where possible.    The area thus outlined was incised deep to adipose tissue with a #15 scalpel blade.  The skin margins were undermined to an appropriate distance in all directions utilizing iris scissors.  Hemostasis was achieved with electrocautery.  The flaps were then advanced into the defect and anchored with interrupted buried subcutaneous sutures.
Wound Care (No Sutures): Petrolatum
Island Pedicle Flap-Requiring Vessel Identification Text: The defect edges were debeveled with a #15 scalpel blade.  Given the location of the defect, shape of the defect and the proximity to free margins an island pedicle advancement flap was deemed most appropriate.  Using a sterile surgical marker, an appropriate advancement flap was drawn, based on the axial vessel mentioned above, incorporating the defect, outlining the appropriate donor tissue and placing the expected incisions within the relaxed skin tension lines where possible.    The area thus outlined was incised deep to adipose tissue with a #15 scalpel blade.  The skin margins were undermined to an appropriate distance in all directions around the primary defect and laterally outward around the island pedicle utilizing iris scissors.  There was minimal undermining beneath the pedicle flap.
O-T Plasty Text: The defect edges were debeveled with a #15 scalpel blade.  Given the location of the defect, shape of the defect and the proximity to free margins an O-T plasty was deemed most appropriate.  Using a sterile surgical marker, an appropriate O-T plasty was drawn incorporating the defect and placing the expected incisions within the relaxed skin tension lines where possible.    The area thus outlined was incised deep to adipose tissue with a #15 scalpel blade.  The skin margins were undermined to an appropriate distance in all directions utilizing iris scissors.
Dorsal Nasal Flap Text: The defect edges were debeveled with a #15 scalpel blade.  Given the location of the defect and the proximity to free margins a dorsal nasal flap,based upon the glabellar folds, was deemed most appropriate.  Using a sterile surgical marker, an appropriate dorsal nasal flap was drawn around the defect.    The area thus outlined was incised deep to adipose tissue with a #15 scalpel blade.  The skin margins were undermined to an appropriate distance in all directions utilizing iris scissors.
Manual Repair Warning Statement: We plan on removing the manually selected variable below in favor of our much easier automatic structured text blocks found in the previous tab. We decided to do this to help make the flow better and give you the full power of structured data. Manual selection is never going to be ideal in our platform and I would encourage you to avoid using manual selection from this point on, especially since I will be sunsetting this feature. It is important that you do one of two things with the customized text below. First, you can save all of the text in a word file so you can have it for future reference. Second, transfer the text to the appropriate area in the Library tab. Lastly, if there is a flap or graft type which we do not have you need to let us know right away so I can add it in before the variable is hidden. No need to panic, we plan to give you roughly 6 months to make the change.
Ear Star Wedge Flap Text: The defect edges were debeveled with a #15 blade scalpel.  Given the location of the defect and the proximity to free margins (helical rim) an ear star wedge flap was deemed most appropriate.  Using a sterile surgical marker, the appropriate flap was drawn incorporating the defect and placing the expected incisions between the helical rim and antihelix where possible.  The area thus outlined was incised through and through with a #15 scalpel blade.
V-Y Flap Text: The defect edges were debeveled with a #15 scalpel blade.  Given the location of the defect, shape of the defect and the proximity to free margins a V-Y flap was deemed most appropriate.  Using a sterile surgical marker, an appropriate advancement flap was drawn incorporating the defect and placing the expected incisions within the relaxed skin tension lines where possible.    The area thus outlined was incised deep to adipose tissue with a #15 scalpel blade.  The skin margins were undermined to an appropriate distance in all directions utilizing iris scissors.
Cheek Interpolation Flap Text: A decision was made to reconstruct the defect utilizing an interpolation axial flap and a staged reconstruction.  A telfa template was made of the defect.  This telfa template was then used to outline the Cheek Interpolation flap.  The donor area for the pedicle flap was then injected with anesthesia.  The flap was excised through the skin and subcutaneous tissue down to the layer of the underlying musculature.  The interpolation flap was carefully excised within this deep plane to maintain its blood supply.  The edges of the donor site were undermined.   The donor site was closed in a primary fashion.  The pedicle was then rotated into position and sutured.  Once the tube was sutured into place, adequate blood supply was confirmed with blanching and refill.  The pedicle was then wrapped with xeroform gauze and dressed appropriately with a telfa and gauze bandage to ensure continued blood supply and protect the attached pedicle.

## 2018-03-19 ENCOUNTER — HOSPITAL ENCOUNTER (OUTPATIENT)
Dept: CARDIOLOGY | Facility: MEDICAL CENTER | Age: 83
End: 2018-03-19
Attending: INTERNAL MEDICINE
Payer: MEDICARE

## 2018-03-19 DIAGNOSIS — R01.1 UNDIAGNOSED CARDIAC MURMURS: ICD-10-CM

## 2018-03-19 PROCEDURE — 93306 TTE W/DOPPLER COMPLETE: CPT

## 2018-03-20 LAB — LV EJECT FRACT MOD 4C 99902: 76.58

## 2018-03-21 ENCOUNTER — APPOINTMENT (RX ONLY)
Dept: URBAN - METROPOLITAN AREA CLINIC 36 | Facility: CLINIC | Age: 83
Setting detail: DERMATOLOGY
End: 2018-03-21

## 2018-03-21 DIAGNOSIS — Z48.817 ENCOUNTER FOR SURGICAL AFTERCARE FOLLOWING SURGERY ON THE SKIN AND SUBCUTANEOUS TISSUE: ICD-10-CM

## 2018-03-21 PROCEDURE — ? POST-OP WOUND CHECK

## 2018-03-21 PROCEDURE — 99024 POSTOP FOLLOW-UP VISIT: CPT

## 2018-03-21 ASSESSMENT — LOCATION ZONE DERM: LOCATION ZONE: LEG

## 2018-03-21 ASSESSMENT — LOCATION DETAILED DESCRIPTION DERM: LOCATION DETAILED: LEFT DISTAL PRETIBIAL REGION

## 2018-03-21 ASSESSMENT — LOCATION SIMPLE DESCRIPTION DERM: LOCATION SIMPLE: LEFT PRETIBIAL REGION

## 2018-03-21 NOTE — PROCEDURE: POST-OP WOUND CHECK
Detail Level: Generalized
Add 47561 Cpt? (Important Note: In 2017 The Use Of 62017 Is Being Tracked By Cms To Determine Future Global Period Reimbursement For Global Periods): yes
Body Location Override (Optional - Billing Will Still Be Based On Selected Body Map Location If Applicable): left medial distal pretibial

## 2018-03-23 ENCOUNTER — HOSPITAL ENCOUNTER (OUTPATIENT)
Dept: RADIOLOGY | Facility: MEDICAL CENTER | Age: 83
End: 2018-03-23
Attending: INTERNAL MEDICINE
Payer: MEDICARE

## 2018-03-23 DIAGNOSIS — M06.30 RHEUMATOID NODULE (HCC): ICD-10-CM

## 2018-03-23 DIAGNOSIS — M81.0 SENILE OSTEOPOROSIS: ICD-10-CM

## 2018-03-23 PROCEDURE — 77080 DXA BONE DENSITY AXIAL: CPT

## 2018-03-28 ENCOUNTER — APPOINTMENT (RX ONLY)
Dept: URBAN - METROPOLITAN AREA CLINIC 36 | Facility: CLINIC | Age: 83
Setting detail: DERMATOLOGY
End: 2018-03-28

## 2018-03-28 DIAGNOSIS — Z48.817 ENCOUNTER FOR SURGICAL AFTERCARE FOLLOWING SURGERY ON THE SKIN AND SUBCUTANEOUS TISSUE: ICD-10-CM

## 2018-03-28 PROCEDURE — ? DRESSING CHANGE (GLOBAL PERIOD)

## 2018-03-28 PROCEDURE — 99024 POSTOP FOLLOW-UP VISIT: CPT

## 2018-03-28 ASSESSMENT — LOCATION DETAILED DESCRIPTION DERM: LOCATION DETAILED: LEFT DISTAL PRETIBIAL REGION

## 2018-03-28 ASSESSMENT — LOCATION SIMPLE DESCRIPTION DERM: LOCATION SIMPLE: LEFT PRETIBIAL REGION

## 2018-03-28 ASSESSMENT — LOCATION ZONE DERM: LOCATION ZONE: LEG

## 2018-03-28 NOTE — PROCEDURE: DRESSING CHANGE (GLOBAL PERIOD)
Body Location Override (Optional - Billing Will Still Be Based On Selected Body Map Location If Applicable): L medial distal pretibial
Add 40556 Cpt? (Important Note: In 2017 The Use Of 16024 Is Being Tracked By Cms To Determine Future Global Period Reimbursement For Global Periods): no
Detail Level: Detailed
Wound Evaluated By: Dr. Pickard & Oneyda

## 2018-04-04 ENCOUNTER — APPOINTMENT (RX ONLY)
Dept: URBAN - METROPOLITAN AREA CLINIC 36 | Facility: CLINIC | Age: 83
Setting detail: DERMATOLOGY
End: 2018-04-04

## 2018-04-04 DIAGNOSIS — Z48.817 ENCOUNTER FOR SURGICAL AFTERCARE FOLLOWING SURGERY ON THE SKIN AND SUBCUTANEOUS TISSUE: ICD-10-CM

## 2018-04-04 PROCEDURE — 99024 POSTOP FOLLOW-UP VISIT: CPT

## 2018-04-04 PROCEDURE — ? DRESSING CHANGE (GLOBAL PERIOD)

## 2018-04-04 ASSESSMENT — LOCATION SIMPLE DESCRIPTION DERM: LOCATION SIMPLE: LEFT PRETIBIAL REGION

## 2018-04-04 ASSESSMENT — LOCATION DETAILED DESCRIPTION DERM: LOCATION DETAILED: LEFT DISTAL PRETIBIAL REGION

## 2018-04-04 ASSESSMENT — LOCATION ZONE DERM: LOCATION ZONE: LEG

## 2018-04-04 NOTE — PROCEDURE: DRESSING CHANGE (GLOBAL PERIOD)
Free Text Wound Car (Will Override Above): Follow up in one week.
Detail Level: Detailed
Body Location Override (Optional - Billing Will Still Be Based On Selected Body Map Location If Applicable): left distal pretibial
Add 18025 Cpt? (Important Note: In 2017 The Use Of 64339 Is Being Tracked By Cms To Determine Future Global Period Reimbursement For Global Periods): no

## 2018-04-17 ENCOUNTER — TELEPHONE (OUTPATIENT)
Dept: MEDICAL GROUP | Facility: PHYSICIAN GROUP | Age: 83
End: 2018-04-17

## 2018-04-18 ENCOUNTER — APPOINTMENT (RX ONLY)
Dept: URBAN - METROPOLITAN AREA CLINIC 36 | Facility: CLINIC | Age: 83
Setting detail: DERMATOLOGY
End: 2018-04-18

## 2018-04-18 DIAGNOSIS — Z48.817 ENCOUNTER FOR SURGICAL AFTERCARE FOLLOWING SURGERY ON THE SKIN AND SUBCUTANEOUS TISSUE: ICD-10-CM

## 2018-04-18 PROCEDURE — ? DRESSING CHANGE (GLOBAL PERIOD)

## 2018-04-18 PROCEDURE — 99024 POSTOP FOLLOW-UP VISIT: CPT

## 2018-04-18 ASSESSMENT — LOCATION DETAILED DESCRIPTION DERM: LOCATION DETAILED: LEFT DISTAL PRETIBIAL REGION

## 2018-04-18 ASSESSMENT — LOCATION SIMPLE DESCRIPTION DERM: LOCATION SIMPLE: LEFT PRETIBIAL REGION

## 2018-04-18 ASSESSMENT — LOCATION ZONE DERM: LOCATION ZONE: LEG

## 2018-04-18 NOTE — PROCEDURE: DRESSING CHANGE (GLOBAL PERIOD)
Detail Level: Detailed
Body Location Override (Optional - Billing Will Still Be Based On Selected Body Map Location If Applicable): left distal pretibial
Add 75128 Cpt? (Important Note: In 2017 The Use Of 99281 Is Being Tracked By Cms To Determine Future Global Period Reimbursement For Global Periods): no

## 2018-04-20 ENCOUNTER — HOSPITAL ENCOUNTER (OUTPATIENT)
Dept: PAIN MANAGEMENT | Facility: REHABILITATION | Age: 83
End: 2018-04-20
Attending: PHYSICAL MEDICINE & REHABILITATION
Payer: MEDICARE

## 2018-04-20 ENCOUNTER — HOSPITAL ENCOUNTER (OUTPATIENT)
Dept: RADIOLOGY | Facility: REHABILITATION | Age: 83
End: 2018-04-20
Attending: PHYSICAL MEDICINE & REHABILITATION
Payer: MEDICARE

## 2018-04-20 VITALS
TEMPERATURE: 96.9 F | RESPIRATION RATE: 16 BRPM | DIASTOLIC BLOOD PRESSURE: 74 MMHG | HEIGHT: 63 IN | OXYGEN SATURATION: 93 % | HEART RATE: 68 BPM | SYSTOLIC BLOOD PRESSURE: 174 MMHG | WEIGHT: 170.19 LBS | BODY MASS INDEX: 30.16 KG/M2

## 2018-04-20 PROCEDURE — 700111 HCHG RX REV CODE 636 W/ 250 OVERRIDE (IP)

## 2018-04-20 PROCEDURE — 62323 NJX INTERLAMINAR LMBR/SAC: CPT

## 2018-04-20 PROCEDURE — 700101 HCHG RX REV CODE 250

## 2018-04-20 PROCEDURE — 700117 HCHG RX CONTRAST REV CODE 255

## 2018-04-20 RX ORDER — LIDOCAINE HYDROCHLORIDE 20 MG/ML
INJECTION, SOLUTION EPIDURAL; INFILTRATION; INTRACAUDAL; PERINEURAL
Status: COMPLETED
Start: 2018-04-20 | End: 2018-04-20

## 2018-04-20 RX ORDER — METHYLPREDNISOLONE ACETATE 80 MG/ML
INJECTION, SUSPENSION INTRA-ARTICULAR; INTRALESIONAL; INTRAMUSCULAR; SOFT TISSUE
Status: COMPLETED
Start: 2018-04-20 | End: 2018-04-20

## 2018-04-20 RX ADMIN — IOHEXOL 1 ML: 240 INJECTION, SOLUTION INTRATHECAL; INTRAVASCULAR; INTRAVENOUS; ORAL at 09:25

## 2018-04-20 RX ADMIN — METHYLPREDNISOLONE ACETATE 80 MG: 80 INJECTION, SUSPENSION INTRA-ARTICULAR; INTRALESIONAL; INTRAMUSCULAR; SOFT TISSUE at 09:27

## 2018-04-20 RX ADMIN — LIDOCAINE HYDROCHLORIDE 4 ML: 20 INJECTION, SOLUTION EPIDURAL; INFILTRATION; INTRACAUDAL; PERINEURAL at 09:22

## 2018-04-20 ASSESSMENT — PAIN SCALES - GENERAL
PAINLEVEL_OUTOF10: 4
PAINLEVEL_OUTOF10: 0

## 2018-04-20 NOTE — PROGRESS NOTES
. Positioned patient by ANA, RN, X - ray Tech. Both lower legs & feet pillow placed for support. Hands supported on stool under head of the bed. Procedure tolerated well by patient. Accompanied to recovery room, ambulatory.

## 2018-04-20 NOTE — PROGRESS NOTES
Current medications reviewed with pt, see medications reconciliation form. Pt yaquelin taking ASA or other blood thinners or anti-inflammatories.  Pt has a ride post-procedure(neighbor to drive).  Printed and verbal discharge instructions given to pt who verbalized understanding.

## 2018-04-20 NOTE — PROCEDURES
DATE OF SERVICE:  04/20/2018    NAME OF PROCEDURE:  Left L4-L5 interlaminar epidural steroid injection with 80   mg of Depo-Medrol under fluoroscopic guidance.    INDICATION:  This is a patient of mine with low back pain from the left lower   extremity due to her known spondylosis and stenosis causing left radiculitis.    For this reason, an epidural steroid injection was chosen for today's   procedure.    DESCRIPTION OF PROCEDURE:  After appropriate informed consent was obtained,   the patient was placed prone on the table.  Her skin was thoroughly cleansed   with Betadine swabs x3, then a subcutaneous intramuscular, interligamentous   region was anesthetized with lidocaine.  A 3-1/2 inch Tuohy was directed under   intermittent fluoroscopic guidance to lamina.  Once the lamina was detected,   the catheter was directed cephalad and medially and loss of resistance   technique was used to determine the epidural space.    EPIDUROGRAM:  A small amount of Omnipaque was placed at the left of midline at   L4-L5.  This was viewed on AP and lateral projections.  It showed a good   cephalad and caudad flow and the flow was unrestricted.  Depo-Medrol 80 mg   along with 0.5 mL of 2% lidocaine and an additional 1 mL of bacteriostatic   normal saline for a total of 2.5 mL was placed.  She tolerated the procedure   well without procedure complications.  She will be referred to the recovery   and followup in the office in the next week to monitor response to injection.       ____________________________________     ALFONSO MARTINEZ MD    AT / [facility]    DD:  04/20/2018 09:29:31  DT:  04/20/2018 10:31:52    D#:  [id number]  Job#:  [id number]    cc: LAMONT SANTIAGO MD, TIANNA BROWN MD

## 2018-04-23 ENCOUNTER — APPOINTMENT (RX ONLY)
Dept: URBAN - METROPOLITAN AREA CLINIC 36 | Facility: CLINIC | Age: 83
Setting detail: DERMATOLOGY
End: 2018-04-23

## 2018-04-30 ENCOUNTER — HOSPITAL ENCOUNTER (OUTPATIENT)
Dept: RADIOLOGY | Facility: MEDICAL CENTER | Age: 83
End: 2018-04-30
Attending: PHYSICAL MEDICINE & REHABILITATION
Payer: MEDICARE

## 2018-04-30 DIAGNOSIS — M25.552 LEFT HIP PAIN: ICD-10-CM

## 2018-04-30 DIAGNOSIS — M06.9 RHEUMATOID ARTHRITIS, INVOLVING UNSPECIFIED SITE, UNSPECIFIED RHEUMATOID FACTOR PRESENCE: ICD-10-CM

## 2018-04-30 DIAGNOSIS — G89.29 CHRONIC BILATERAL LOW BACK PAIN WITHOUT SCIATICA: ICD-10-CM

## 2018-04-30 DIAGNOSIS — M54.50 CHRONIC BILATERAL LOW BACK PAIN WITHOUT SCIATICA: ICD-10-CM

## 2018-04-30 DIAGNOSIS — M85.80 OSTEOPENIA, UNSPECIFIED LOCATION: ICD-10-CM

## 2018-04-30 PROCEDURE — 73502 X-RAY EXAM HIP UNI 2-3 VIEWS: CPT | Mod: LT

## 2018-04-30 RX ORDER — LIDOCAINE 50 MG/G
1 PATCH TOPICAL EVERY 24 HOURS
Qty: 90 PATCH | Refills: 0 | Status: SHIPPED | OUTPATIENT
Start: 2018-04-30 | End: 2018-11-08 | Stop reason: SDUPTHER

## 2018-05-02 ENCOUNTER — APPOINTMENT (RX ONLY)
Dept: URBAN - METROPOLITAN AREA CLINIC 4 | Facility: CLINIC | Age: 83
Setting detail: DERMATOLOGY
End: 2018-05-02

## 2018-05-02 ENCOUNTER — APPOINTMENT (RX ONLY)
Dept: URBAN - METROPOLITAN AREA CLINIC 36 | Facility: CLINIC | Age: 83
Setting detail: DERMATOLOGY
End: 2018-05-02

## 2018-05-02 DIAGNOSIS — Z48.817 ENCOUNTER FOR SURGICAL AFTERCARE FOLLOWING SURGERY ON THE SKIN AND SUBCUTANEOUS TISSUE: ICD-10-CM

## 2018-05-02 PROBLEM — C44.519 BASAL CELL CARCINOMA OF SKIN OF OTHER PART OF TRUNK: Status: ACTIVE | Noted: 2018-05-02

## 2018-05-02 PROCEDURE — ? CURETTAGE AND DESTRUCTION

## 2018-05-02 PROCEDURE — 17261 DSTRJ MAL LES T/A/L .6-1.0CM: CPT

## 2018-05-02 PROCEDURE — 99024 POSTOP FOLLOW-UP VISIT: CPT

## 2018-05-02 PROCEDURE — ? DRESSING CHANGE (GLOBAL PERIOD)

## 2018-05-02 ASSESSMENT — LOCATION DETAILED DESCRIPTION DERM: LOCATION DETAILED: LEFT DISTAL PRETIBIAL REGION

## 2018-05-02 ASSESSMENT — LOCATION SIMPLE DESCRIPTION DERM: LOCATION SIMPLE: LEFT PRETIBIAL REGION

## 2018-05-02 ASSESSMENT — LOCATION ZONE DERM: LOCATION ZONE: LEG

## 2018-05-02 NOTE — PROCEDURE: DRESSING CHANGE (GLOBAL PERIOD)
Add 49048 Cpt? (Important Note: In 2017 The Use Of 48464 Is Being Tracked By Cms To Determine Future Global Period Reimbursement For Global Periods): no
Body Location Override (Optional - Billing Will Still Be Based On Selected Body Map Location If Applicable): left distal pretibial
Detail Level: Detailed

## 2018-05-02 NOTE — PROCEDURE: CURETTAGE AND DESTRUCTION
What Was Performed First?: Curettage
Size Of Lesion After Curettage: 0.9
Add Ability To Document Additional Intralesional Injection: No
Post-Care Instructions: I reviewed with the patient in detail post-care instructions. Patient is to keep the area dry for 48 hours, and not to engage in any swimming until the area is healed. Should the patient develop any fevers, chills, bleeding, severe pain patient will contact the office immediately.
Detail Level: Detailed
Total Volume (Ccs): 1
Anesthesia Volume In Cc: 0.2
Additional Information: (Optional): The wound was cleaned, and a pressure dressing was applied.  The patient received detailed post-op instructions.
Consent was obtained from the patient. The risks, benefits and alternatives to therapy were discussed in detail. Specifically, the risks of infection, scarring, bleeding, prolonged wound healing, nerve injury, incomplete removal, allergy to anesthesia and recurrence were addressed. Alternatives to ED&C, such as: surgical removal and XRT were also discussed.  Prior to the procedure, the treatment site was clearly identified and confirmed by the patient. All components of Universal Protocol/PAUSE Rule completed.
Number Of Curettages: 3
Bill As A Line Item Or As Units: Line Item
Anesthesia Type: 1% lidocaine with epinephrine
Render Post-Care Instructions In Note?: yes
Size Of Lesion In Cm: 0.5
Cautery Type: electrodesiccation

## 2018-05-07 ENCOUNTER — APPOINTMENT (RX ONLY)
Dept: URBAN - METROPOLITAN AREA CLINIC 36 | Facility: CLINIC | Age: 83
Setting detail: DERMATOLOGY
End: 2018-05-07

## 2018-05-07 DIAGNOSIS — Z48.817 ENCOUNTER FOR SURGICAL AFTERCARE FOLLOWING SURGERY ON THE SKIN AND SUBCUTANEOUS TISSUE: ICD-10-CM

## 2018-05-07 DIAGNOSIS — L57.8 OTHER SKIN CHANGES DUE TO CHRONIC EXPOSURE TO NONIONIZING RADIATION: ICD-10-CM

## 2018-05-07 PROBLEM — C44.319 BASAL CELL CARCINOMA OF SKIN OF OTHER PARTS OF FACE: Status: ACTIVE | Noted: 2018-05-07

## 2018-05-07 PROBLEM — C44.629 SQUAMOUS CELL CARCINOMA OF SKIN OF LEFT UPPER LIMB, INCLUDING SHOULDER: Status: ACTIVE | Noted: 2018-05-07

## 2018-05-07 PROCEDURE — ? POST-OP WOUND CHECK

## 2018-05-07 PROCEDURE — 13132 CMPLX RPR F/C/C/M/N/AX/G/H/F: CPT | Mod: 79

## 2018-05-07 PROCEDURE — 99024 POSTOP FOLLOW-UP VISIT: CPT

## 2018-05-07 PROCEDURE — 17311 MOHS 1 STAGE H/N/HF/G: CPT | Mod: 79

## 2018-05-07 PROCEDURE — ? MOHS SURGERY

## 2018-05-07 PROCEDURE — ? COUNSELING

## 2018-05-07 PROCEDURE — 17312 MOHS ADDL STAGE: CPT | Mod: 79

## 2018-05-07 PROCEDURE — ? OBSERVATION

## 2018-05-07 ASSESSMENT — LOCATION DETAILED DESCRIPTION DERM: LOCATION DETAILED: LEFT DISTAL PRETIBIAL REGION

## 2018-05-07 ASSESSMENT — LOCATION SIMPLE DESCRIPTION DERM: LOCATION SIMPLE: LEFT PRETIBIAL REGION

## 2018-05-07 ASSESSMENT — LOCATION ZONE DERM: LOCATION ZONE: LEG

## 2018-05-07 NOTE — PROCEDURE: POST-OP WOUND CHECK
Additional Comments: Cleanse wound with Maxiclens
Add 49698 Cpt? (Important Note: In 2017 The Use Of 56851 Is Being Tracked By Cms To Determine Future Global Period Reimbursement For Global Periods): yes
Body Location Override (Optional - Billing Will Still Be Based On Selected Body Map Location If Applicable): Left distal pre tibial region
Wound Evaluated By: 
Detail Level: Detailed

## 2018-05-07 NOTE — PROCEDURE: MOHS SURGERY
Stage 6: Additional Anesthesia Type: 1% lidocaine with epinephrine
Partial Purse String (Simple) Text: Given the location of the defect and the characteristics of the surrounding skin a simple purse string closure was deemed most appropriate.  Undermining was performed circumfirentially around the surgical defect.  A purse string suture was then placed and tightened. Wound tension only allowed a partial closure of the circular defect.
Quadrant Reporting?: no
Quadrants Reporting?: 0
Bcc Histology Text: There were numerous aggregates of basaloid cells.
Crescentic Complex Repair Preamble Text (Leave Blank If You Do Not Want): Extensive wide undermining was performed at least 2 cm in all directions.
Wound Care (No Sutures): Petrolatum
Consent Type: Consent 1 (Standard)
Stage 2: Additional Anesthesia Type: 1% lidocaine with 1:100,000 epinephrine and 408mcg clindamycin/ml and a 1:10 solution of 8.4% sodium bicarbonate
Medical Necessity Statement: Based on my medical judgement, Mohs surgery is the most appropriate treatment for this cancer compared to other treatments.
Deep Sutures: 5-0 Vicryl
Island Pedicle Flap With Canthal Suspension Text: The defect edges were debeveled with a #15 scalpel blade.  Given the location of the defect, shape of the defect and the proximity to free margins an island pedicle advancement flap was deemed most appropriate.  Using a sterile surgical marker, an appropriate advancement flap was drawn incorporating the defect, outlining the appropriate donor tissue and placing the expected incisions within the relaxed skin tension lines where possible. The area thus outlined was incised deep to adipose tissue with a #15 scalpel blade.  The skin margins were undermined to an appropriate distance in all directions around the primary defect and laterally outward around the island pedicle utilizing iris scissors.  There was minimal undermining beneath the pedicle flap. A suspension suture was placed in the canthal tendon to prevent tension and prevent ectropion.
Repair Performed By Another Provider Text (Leave Blank If You Do Not Want): After obtaining clear surgical margins the defect was repaired by another provider.
Ftsg Text: The defect edges were debeveled with a #15 scalpel blade.  Given the location of the defect, shape of the defect and the proximity to free margins a full thickness skin graft was deemed most appropriate.  Using a sterile surgical marker, the primary defect shape was transferred to the donor site. The area thus outlined was incised deep to adipose tissue with a #15 scalpel blade.  The harvested graft was then trimmed of adipose tissue until only dermis and epidermis was left.  The skin margins of the secondary defect were undermined to an appropriate distance in all directions utilizing iris scissors.  The secondary defect was closed with interrupted buried subcutaneous sutures.  The skin edges were then re-apposed with running  sutures.  The skin graft was then placed in the primary defect and oriented appropriately.
Dorsal Nasal Flap Text: The defect edges were debeveled with a #15 scalpel blade.  Given the location of the defect and the proximity to free margins a dorsal nasal flap,based upon the glabellar folds, was deemed most appropriate.  Using a sterile surgical marker, an appropriate dorsal nasal flap was drawn around the defect.    The area thus outlined was incised deep to adipose tissue with a #15 scalpel blade.  The skin margins were undermined to an appropriate distance in all directions utilizing iris scissors.
Unique Flap 3 Text: The defect edges were debeveled with a #15 scalpel blade.  Given the location of the defect, shape of the defect and the proximity to free margins a Mercedes (double advancement flap) was deemed most appropriate.  Using a sterile surgical marker, the appropriate transposition flaps were drawn incorporating the defect and placing the expected incisions within the relaxed skin tension lines where possible.    The area thus outlined was incised deep to adipose tissue with a #15 scalpel blade.  The skin margins were undermined to an appropriate distance in all directions utilizing iris scissors.  Hemostasis was achieved with electrocautery.  The flaps were then advanced into the defect and anchored with interrupted buried subcutaneous sutures.
Melolabial Transposition Flap Text: The defect edges were debeveled with a #15 scalpel blade.  Given the location of the defect and the proximity to free margins a melolabial flap was deemed most appropriate.  Using a sterile surgical marker, an appropriate melolabial transposition flap was drawn incorporating the defect.    The area thus outlined was incised deep to adipose tissue with a #15 scalpel blade.  The skin margins were undermined to an appropriate distance in all directions utilizing iris scissors.
Show Surgical Defect Variables In The Stage Tabs: Yes
Epidermal Sutures: 5-0 Ethilon
Mucosal Advancement Flap Text: Given the location of the defect, shape of the defect and the proximity to free margins a mucosal advancement flap was deemed most appropriate. Incisions were made with a 15 blade scalpel in the appropriate fashion along the cutaneous vermilion border and the mucosal lip. The remaining actinically damaged mucosal tissue was excised.  The mucosal advancement flap was then elevated to the gingival sulcus with care taken to preserve the neurovascular structures and advanced into the primary defect. Care was taken to ensure that precise realignment of the vermilion border was achieved.
Dermal Autograft Text: The defect edges were debeveled with a #15 scalpel blade.  Given the location of the defect, shape of the defect and the proximity to free margins a dermal autograft was deemed most appropriate.  Using a sterile surgical marker, the primary defect shape was transferred to the donor site. The area thus outlined was incised deep to adipose tissue with a #15 scalpel blade.  The harvested graft was then trimmed of adipose and epidermal tissue until only dermis was left.  The skin graft was then placed in the primary defect and oriented appropriately.
Bi-Rhombic Flap Text: The defect edges were debeveled with a #15 scalpel blade.  Given the location of the defect and the proximity to free margins a bi-rhombic flap was deemed most appropriate.  Using a sterile surgical marker, an appropriate rhombic flap was drawn incorporating the defect. The area thus outlined was incised deep to adipose tissue with a #15 scalpel blade.  The skin margins were undermined to an appropriate distance in all directions utilizing iris scissors.
No Residual Tumor Seen Histology Text: There were no malignant cells seen in the sections examined.
Advancement Flap (Single) Text: The defect edges were debeveled with a #15 scalpel blade.  Given the location of the defect and the proximity to free margins a single advancement flap was deemed most appropriate.  Using a sterile surgical marker, an appropriate advancement flap was drawn incorporating the defect and placing the expected incisions within the relaxed skin tension lines where possible.    The area thus outlined was incised deep to adipose tissue with a #15 scalpel blade.  The skin margins were undermined to an appropriate distance in all directions utilizing iris scissors.
W Plasty Text: The lesion was extirpated to the level of the fat with a #15 scalpel blade.  Given the location of the defect, shape of the defect and the proximity to free margins a W-plasty was deemed most appropriate for repair.  Using a sterile surgical marker, the appropriate transposition arms of the W-plasty were drawn incorporating the defect and placing the expected incisions within the relaxed skin tension lines where possible.    The area thus outlined was incised deep to adipose tissue with a #15 scalpel blade.  The skin margins were undermined to an appropriate distance in all directions utilizing iris scissors.  The opposing transposition arms were then transposed into place in opposite direction and anchored with interrupted buried subcutaneous sutures.
Initial Size Of Lesion: 0.8
Area H Indication Text: Tumors in this location are included in Area H (eyelids, eyebrows, nose, lips, chin, ear, pre-auricular, post-auricular, temple, genitalia, hands, feet, ankles and areola).  Tissue conservation is critical in these anatomic locations.
Consent (Temporal Branch)/Introductory Paragraph: The rationale for Mohs was explained to the patient and consent was obtained. The risks, benefits and alternatives to therapy were discussed in detail. Specifically, the risks of damage to the temporal branch of the facial nerve, infection, scarring, bleeding, prolonged wound healing, incomplete removal, allergy to anesthesia, and recurrence were addressed. Prior to the procedure, the treatment site was clearly identified and confirmed by the patient. All components of Universal Protocol/PAUSE Rule completed.
Mauc Instructions: By selecting yes to the question below the MAUC number will be added into the note.  This will be calculated automatically based on the diagnosis chosen, the size entered, the body zone selected (H,M,L) and the specific indications you chose. You will also have the option to override the Mohs AUC if you disagree with the automatically calculated number and this option is found in the Case Summary tab.
Crescentic Intermediate Repair Preamble Text (Leave Blank If You Do Not Want): Undermining was performed with blunt dissection.
Post-Care Instructions: I reviewed with the patient in detail post-care instructions. Patient is not to engage in any heavy lifting, exercise, or swimming for the next 14 days. Should the patient develop any fevers, chills, bleeding, severe pain patient will contact the office immediately.
Spiral Flap Text: The defect edges were debeveled with a #15 scalpel blade.  Given the location of the defect, shape of the defect and the proximity to free margins a spiral flap was deemed most appropriate.  Using a sterile surgical marker, an appropriate rotation flap was drawn incorporating the defect and placing the expected incisions within the relaxed skin tension lines where possible. The area thus outlined was incised deep to adipose tissue with a #15 scalpel blade.  The skin margins were undermined to an appropriate distance in all directions utilizing iris scissors.
Mohs Method Verbiage: An incision at a 45 degree angle following the standard Mohs approach was done and the specimen was harvested as a microscopic controlled layer.
Unique Flap 2 Name: Peng Flap
Epidermal Closure: running cuticular
Melolabial Interpolation Flap Text: A decision was made to reconstruct the defect utilizing an interpolation axial flap and a staged reconstruction.  A telfa template was made of the defect.  This telfa template was then used to outline the melolabial interpolation flap.  The donor area for the pedicle flap was then injected with anesthesia.  The flap was excised through the skin and subcutaneous tissue down to the layer of the underlying musculature.  The pedicle flap was carefully excised within this deep plane to maintain its blood supply.  The edges of the donor site were undermined.   The donor site was closed in a primary fashion.  The pedicle was then rotated into position and sutured.  Once the tube was sutured into place, adequate blood supply was confirmed with blanching and refill.  The pedicle was then wrapped with xeroform gauze and dressed appropriately with a telfa and gauze bandage to ensure continued blood supply and protect the attached pedicle.
Transposition Flap Text: The defect edges were debeveled with a #15 scalpel blade.  Given the location of the defect and the proximity to free margins a transposition flap was deemed most appropriate.  Using a sterile surgical marker, an appropriate transposition flap was drawn incorporating the defect.    The area thus outlined was incised deep to adipose tissue with a #15 scalpel blade.  The skin margins were undermined to an appropriate distance in all directions utilizing iris scissors.
V-Y Flap Text: The defect edges were debeveled with a #15 scalpel blade.  Given the location of the defect, shape of the defect and the proximity to free margins a V-Y flap was deemed most appropriate.  Using a sterile surgical marker, an appropriate advancement flap was drawn incorporating the defect and placing the expected incisions within the relaxed skin tension lines where possible.    The area thus outlined was incised deep to adipose tissue with a #15 scalpel blade.  The skin margins were undermined to an appropriate distance in all directions utilizing iris scissors.
Bilobed Transposition Flap Text: The defect edges were debeveled with a #15 scalpel blade.  Given the location of the defect and the proximity to free margins a bilobed transposition flap was deemed most appropriate.  Using a sterile surgical marker, an appropriate bilobe flap drawn around the defect.    The area thus outlined was incised deep to adipose tissue with a #15 scalpel blade.  The skin margins were undermined to an appropriate distance in all directions utilizing iris scissors.
Simple / Intermediate / Complex Repair - Final Wound Length In Cm: 3.2
Consent (Near Eyelid Margin)/Introductory Paragraph: The rationale for Mohs was explained to the patient and consent was obtained. The risks, benefits and alternatives to therapy were discussed in detail. Specifically, the risks of ectropion or eyelid deformity, infection, scarring, bleeding, prolonged wound healing, incomplete removal, allergy to anesthesia, nerve injury and recurrence were addressed. Prior to the procedure, the treatment site was clearly identified and confirmed by the patient. All components of Universal Protocol/PAUSE Rule completed.
Detail Level: Detailed
Burow's Advancement Flap Text: The defect edges were debeveled with a #15 scalpel blade.  Given the location of the defect and the proximity to free margins a Burow's advancement flap was deemed most appropriate.  Using a sterile surgical marker, the appropriate advancement flap was drawn incorporating the defect and placing the expected incisions within the relaxed skin tension lines where possible.    The area thus outlined was incised deep to adipose tissue with a #15 scalpel blade.  The skin margins were undermined to an appropriate distance in all directions utilizing iris scissors.
Manual Repair Warning Statement: We plan on removing the manually selected variable below in favor of our much easier automatic structured text blocks found in the previous tab. We decided to do this to help make the flow better and give you the full power of structured data. Manual selection is never going to be ideal in our platform and I would encourage you to avoid using manual selection from this point on, especially since I will be sunsetting this feature. It is important that you do one of two things with the customized text below. First, you can save all of the text in a word file so you can have it for future reference. Second, transfer the text to the appropriate area in the Library tab. Lastly, if there is a flap or graft type which we do not have you need to let us know right away so I can add it in before the variable is hidden. No need to panic, we plan to give you roughly 6 months to make the change.
O-T Plasty Text: The defect edges were debeveled with a #15 scalpel blade.  Given the location of the defect, shape of the defect and the proximity to free margins an O-T plasty was deemed most appropriate.  Using a sterile surgical marker, an appropriate O-T plasty was drawn incorporating the defect and placing the expected incisions within the relaxed skin tension lines where possible.    The area thus outlined was incised deep to adipose tissue with a #15 scalpel blade.  The skin margins were undermined to an appropriate distance in all directions utilizing iris scissors.
Area M Indication Text: Tumors in this location are included in Area M (cheek, forehead, scalp, neck, jawline and pretibial skin).  Mohs surgery is indicated for tumors in these anatomic locations.
Postop Diagnosis: same
Unique Flap 4 Name: Banner Flap
Eye Protection Verbiage: Before proceeding with the stage, a plastic scleral shield was inserted. The globe was anesthetized with a few drops of 1% lidocaine with 1:100,000 epinephrine. Then, an appropriate sized scleral shield was chosen and coated with lacrilube ointment. The shield was gently inserted and left in place for the duration of each stage. After the stage was completed, the shield was gently removed.
Repair Anesthesia Method: local infiltration
Tissue Cultured Epidermal Autograft Text: The defect edges were debeveled with a #15 scalpel blade.  Given the location of the defect, shape of the defect and the proximity to free margins a tissue cultured epidermal autograft was deemed most appropriate.  The graft was then trimmed to fit the size of the defect.  The graft was then placed in the primary defect and oriented appropriately.
Double Island Pedicle Flap Text: The defect edges were debeveled with a #15 scalpel blade.  Given the location of the defect, shape of the defect and the proximity to free margins a double island pedicle advancement flap was deemed most appropriate.  Using a sterile surgical marker, an appropriate advancement flap was drawn incorporating the defect, outlining the appropriate donor tissue and placing the expected incisions within the relaxed skin tension lines where possible.    The area thus outlined was incised deep to adipose tissue with a #15 scalpel blade.  The skin margins were undermined to an appropriate distance in all directions around the primary defect and laterally outward around the island pedicle utilizing iris scissors.  There was minimal undermining beneath the pedicle flap.
Stage 1: Number Of Blocks?: 1
Cheek Interpolation Flap Text: A decision was made to reconstruct the defect utilizing an interpolation axial flap and a staged reconstruction.  A telfa template was made of the defect.  This telfa template was then used to outline the Cheek Interpolation flap.  The donor area for the pedicle flap was then injected with anesthesia.  The flap was excised through the skin and subcutaneous tissue down to the layer of the underlying musculature.  The interpolation flap was carefully excised within this deep plane to maintain its blood supply.  The edges of the donor site were undermined.   The donor site was closed in a primary fashion.  The pedicle was then rotated into position and sutured.  Once the tube was sutured into place, adequate blood supply was confirmed with blanching and refill.  The pedicle was then wrapped with xeroform gauze and dressed appropriately with a telfa and gauze bandage to ensure continued blood supply and protect the attached pedicle.
Number Of Stages: 2
Complex Repair And Flap Additional Text (Will Appearing After The Standard Complex Repair Text): The complex repair was not sufficient to completely close the primary defect. The remaining additional defect was repaired with the flap mentioned below.
Island Pedicle Flap Text: The defect edges were debeveled with a #15 scalpel blade.  Given the location of the defect, shape of the defect and the proximity to free margins an island pedicle advancement flap was deemed most appropriate.  Using a sterile surgical marker, an appropriate advancement flap was drawn incorporating the defect, outlining the appropriate donor tissue and placing the expected incisions within the relaxed skin tension lines where possible.    The area thus outlined was incised deep to adipose tissue with a #15 scalpel blade.  The skin margins were undermined to an appropriate distance in all directions around the primary defect and laterally outward around the island pedicle utilizing iris scissors.  There was minimal undermining beneath the pedicle flap.
Previous Accession (Optional): [id number]
Ear Star Wedge Flap Text: The defect edges were debeveled with a #15 blade scalpel.  Given the location of the defect and the proximity to free margins (helical rim) an ear star wedge flap was deemed most appropriate.  Using a sterile surgical marker, the appropriate flap was drawn incorporating the defect and placing the expected incisions between the helical rim and antihelix where possible.  The area thus outlined was incised through and through with a #15 scalpel blade.
Mohs Case Number: 
Hemostasis: Electrocautery
S Plasty Text: Given the location and shape of the defect, and the orientation of relaxed skin tension lines, an S-plasty was deemed most appropriate for repair.  Using a sterile surgical marker, the appropriate outline of the S-plasty was drawn, incorporating the defect and placing the expected incisions within the relaxed skin tension lines where possible.  The area thus outlined was incised deep to adipose tissue with a #15 scalpel blade.  The skin margins were undermined to an appropriate distance in all directions utilizing iris scissors. The skin flaps were advanced over the defect.  The opposing margins were then approximated with interrupted buried subcutaneous sutures.
Location Indication Override (Is Already Calculated Based On Selected Body Location): Area H
Graft Basting Suture (Optional): 5-0 Fast Absorbing Gut
Tarsorrhaphy Text: A tarsorrhaphy was performed using Frost sutures.
Referred To Asc For Closure Text (Leave Blank If You Do Not Want): After obtaining clear surgical margins the patient was sent to an ASC for surgical repair.  The patient understands they will receive post-surgical care and follow-up from the ASC physician.
Ear Wedge Repair Text: A wedge excision was completed by carrying down an excision through the full thickness of the ear and cartilage with an inward facing Burow's triangle. The wound was then closed in a layered fashion.
Repair Type: Complex Repair
Referred To Oculoplastics For Closure Text (Leave Blank If You Do Not Want): After obtaining clear surgical margins the patient was sent to oculoplastics for surgical repair.  The patient understands they will receive post-surgical care and follow-up from the referring physician's office.
Additional Anesthesia Volume In Cc: 6
Unna Boot Text: An Unna boot was placed to help immobilize the limb and facilitate more rapid healing.
Referred To Otolaryngology For Closure Text (Leave Blank If You Do Not Want): After obtaining clear surgical margins the patient was sent to otolaryngology for surgical repair.  The patient understands they will receive post-surgical care and follow-up from the referring physician's office.
Hatchet Flap Text: The defect edges were debeveled with a #15 scalpel blade.  Given the location of the defect, shape of the defect and the proximity to free margins a hatchet flap based from the glabella was deemed most appropriate.  Using a sterile surgical marker, an appropriate glabellar hatchet flap was drawn incorporating the defect and placing the expected incisions within the relaxed skin tension lines where possible.    The area thus outlined was incised deep to adipose tissue with a #15 scalpel blade.  The skin margins were undermined to an appropriate distance in all directions utilizing iris scissors.
Cheiloplasty (Less Than 50%) Text: A decision was made to reconstruct the defect with a  cheiloplasty.  The defect was undermined extensively.  Additional obicularis oris muscle was excised with a 15 blade scalpel.  The defect was converted into a full thickness wedge, of less than 50% of the vertical height of the lip, to facilite a better cosmetic result.  Small vessels were then tied off with 5-0 monocyrl. The obicularis oris, superficial fascia, adipose and dermis were then reapproximated.  After the deeper layers were approximated the epidermis was reapproximated with particular care given to realign the vermilion border.
Area L Indication Text: Tumors in this location are included in Area L (trunk and extremities).  Mohs surgery is indicated for larger tumors, or tumors with aggressive histologic features, in these anatomic locations.
Consent (Spinal Accessory)/Introductory Paragraph: The rationale for Mohs was explained to the patient and consent was obtained. The risks, benefits and alternatives to therapy were discussed in detail. Specifically, the risks of damage to the spinal accessory nerve, infection, scarring, bleeding, prolonged wound healing, incomplete removal, allergy to anesthesia, and recurrence were addressed. Prior to the procedure, the treatment site was clearly identified and confirmed by the patient. All components of Universal Protocol/PAUSE Rule completed.
Posterior Auricular Interpolation Flap Text: A decision was made to reconstruct the defect utilizing an interpolation axial flap and a staged reconstruction.  A telfa template was made of the defect.  This telfa template was then used to outline the posterior auricular interpolation flap.  The donor area for the pedicle flap was then injected with anesthesia.  The flap was excised through the skin and subcutaneous tissue down to the layer of the underlying musculature.  The pedicle flap was carefully excised within this deep plane to maintain its blood supply.  The edges of the donor site were undermined.   The donor site was closed in a primary fashion.  The pedicle was then rotated into position and sutured.  Once the tube was sutured into place, adequate blood supply was confirmed with blanching and refill.  The pedicle was then wrapped with xeroform gauze and dressed appropriately with a telfa and gauze bandage to ensure continued blood supply and protect the attached pedicle.
Unique Flap 2 Text: A decision was made to reconstruct the defect utilizing a Peng Flap (Bilateral Advancement Rotation Flap). Given the location of the defect and the proximity to free margins, this flap was deemed most appropriate.  Using a sterile surgical marker, the appropriate rotation flaps were drawn incorporating the defect and placing the expected incisions within the relaxed skin tension lines where possible.    The area thus outlined was incised deep to adipose tissue with a #15 scalpel blade.  The skin margins were undermined to an appropriate distance in all directions utilizing iris scissors.
Consent 2/Introductory Paragraph: Mohs surgery was explained to the patient and consent was obtained. The risks, benefits and alternatives to therapy were discussed in detail. Specifically, the risks of infection, scarring, bleeding, prolonged wound healing, incomplete removal, allergy to anesthesia, nerve injury and recurrence were addressed. Prior to the procedure, the treatment site was clearly identified and confirmed by the patient. All components of Universal Protocol/PAUSE Rule completed.
Mohs Histo Method Verbiage: Each section was then chromacoded and processed in the Mohs lab using the Mohs protocol and submitted for frozen section.
A-T Advancement Flap Text: The defect edges were debeveled with a #15 scalpel blade.  Given the location of the defect, shape of the defect and the proximity to free margins an A-T advancement flap was deemed most appropriate.  Using a sterile surgical marker, an appropriate advancement flap was drawn incorporating the defect and placing the expected incisions within the relaxed skin tension lines where possible.    The area thus outlined was incised deep to adipose tissue with a #15 scalpel blade.  The skin margins were undermined to an appropriate distance in all directions utilizing iris scissors.
Purse String (Simple) Text: Given the location of the defect and the characteristics of the surrounding skin a purse string closure was deemed most appropriate.  Undermining was performed circumfirentially around the surgical defect.  A purse string suture was then placed and tightened.
Home Suture Removal Text: Patient was provided instructions on removing sutures and will remove their sutures at home.  If they have any questions or difficulties they will call the office.
Composite Graft Text: The defect edges were debeveled with a #15 scalpel blade.  Given the location of the defect, shape of the defect, the proximity to free margins and the fact the defect was full thickness a composite graft was deemed most appropriate.  The defect was outline and then transferred to the donor site.  A full thickness graft was then excised from the donor site. The graft was then placed in the primary defect, oriented appropriately and then sutured into place.  The secondary defect was then repaired using a primary closure.
Surgical Defect Length In Cm (Optional): 2.4
Partial Purse String (Intermediate) Text: Given the location of the defect and the characteristics of the surrounding skin an intermediate purse string closure was deemed most appropriate.  Undermining was performed circumfirentially around the surgical defect.  A purse string suture was then placed and tightened. Wound tension only allowed a partial closure of the circular defect.
Secondary Intention Text (Leave Blank If You Do Not Want): The defect will heal with secondary intention.
Consent (Nose)/Introductory Paragraph: The rationale for Mohs was explained to the patient and consent was obtained. The risks, benefits and alternatives to therapy were discussed in detail. Specifically, the risks of nasal deformity, changes in the flow of air through the nose, infection, scarring, bleeding, prolonged wound healing, incomplete removal, allergy to anesthesia, nerve injury and recurrence were addressed. Prior to the procedure, the treatment site was clearly identified and confirmed by the patient. All components of Universal Protocol/PAUSE Rule completed.
Consent (Scalp)/Introductory Paragraph: The rationale for Mohs was explained to the patient and consent was obtained. The risks, benefits and alternatives to therapy were discussed in detail. Specifically, the risks of changes in hair growth pattern secondary to repair, infection, scarring, bleeding, prolonged wound healing, incomplete removal, allergy to anesthesia, nerve injury and recurrence were addressed. Prior to the procedure, the treatment site was clearly identified and confirmed by the patient. All components of Universal Protocol/PAUSE Rule completed.
Complex/Intermediate Repair Variations: Crescentic
Rhombic Flap Text: The defect edges were debeveled with a #15 scalpel blade.  Given the location of the defect and the proximity to free margins a rhombic flap was deemed most appropriate.  Using a sterile surgical marker, an appropriate rhombic flap was drawn incorporating the defect.    The area thus outlined was incised deep to adipose tissue with a #15 scalpel blade.  The skin margins were undermined to an appropriate distance in all directions utilizing iris scissors.
Consent (Marginal Mandibular)/Introductory Paragraph: The rationale for Mohs was explained to the patient and consent was obtained. The risks, benefits and alternatives to therapy were discussed in detail. Specifically, the risks of damage to the marginal mandibular branch of the facial nerve, infection, scarring, bleeding, prolonged wound healing, incomplete removal, allergy to anesthesia, and recurrence were addressed. Prior to the procedure, the treatment site was clearly identified and confirmed by the patient. All components of Universal Protocol/PAUSE Rule completed.
Modified Advancement Flap Text: The defect edges were debeveled with a #15 scalpel blade.  Given the location of the defect, shape of the defect and the proximity to free margins a modified advancement flap was deemed most appropriate.  Using a sterile surgical marker, an appropriate advancement flap was drawn incorporating the defect and placing the expected incisions within the relaxed skin tension lines where possible.    The area thus outlined was incised deep to adipose tissue with a #15 scalpel blade.  The skin margins were undermined to an appropriate distance in all directions utilizing iris scissors.
Unique Flap 3 Name: Mercedes Flap
Epidermal Autograft Text: The defect edges were debeveled with a #15 scalpel blade.  Given the location of the defect, shape of the defect and the proximity to free margins an epidermal autograft was deemed most appropriate.  Using a sterile surgical marker, the primary defect shape was transferred to the donor site. The epidermal graft was then harvested.  The skin graft was then placed in the primary defect and oriented appropriately.
Mastoid Interpolation Flap Text: A decision was made to reconstruct the defect utilizing an interpolation axial flap and a staged reconstruction.  A telfa template was made of the defect.  This telfa template was then used to outline the mastoid interpolation flap.  The donor area for the pedicle flap was then injected with anesthesia.  The flap was excised through the skin and subcutaneous tissue down to the layer of the underlying musculature.  The pedicle flap was carefully excised within this deep plane to maintain its blood supply.  The edges of the donor site were undermined.   The donor site was closed in a primary fashion.  The pedicle was then rotated into position and sutured.  Once the tube was sutured into place, adequate blood supply was confirmed with blanching and refill.  The pedicle was then wrapped with xeroform gauze and dressed appropriately with a telfa and gauze bandage to ensure continued blood supply and protect the attached pedicle.
Star Wedge Flap Text: The defect edges were debeveled with a #15 scalpel blade.  Given the location of the defect, shape of the defect and the proximity to free margins a star wedge flap was deemed most appropriate.  Using a sterile surgical marker, an appropriate rotation flap was drawn incorporating the defect and placing the expected incisions within the relaxed skin tension lines where possible. The area thus outlined was incised deep to adipose tissue with a #15 scalpel blade.  The skin margins were undermined to an appropriate distance in all directions utilizing iris scissors.
Advancement Flap (Double) Text: The defect edges were debeveled with a #15 scalpel blade.  Given the location of the defect and the proximity to free margins a double advancement flap was deemed most appropriate.  Using a sterile surgical marker, the appropriate advancement flaps were drawn incorporating the defect and placing the expected incisions within the relaxed skin tension lines where possible.    The area thus outlined was incised deep to adipose tissue with a #15 scalpel blade.  The skin margins were undermined to an appropriate distance in all directions utilizing iris scissors.
Primary Defect Width In Cm (Final Defect Size - Required For Flaps/Grafts): 0.9
Trilobed Flap Text: The defect edges were debeveled with a #15 scalpel blade.  Given the location of the defect and the proximity to free margins a trilobed flap was deemed most appropriate.  Using a sterile surgical marker, an appropriate trilobed flap drawn around the defect.    The area thus outlined was incised deep to adipose tissue with a #15 scalpel blade.  The skin margins were undermined to an appropriate distance in all directions utilizing iris scissors.
Referring Physician (Optional): A. Schoening
Donor Site Anesthesia Type: same as repair anesthesia
O-L Flap Text: The defect edges were debeveled with a #15 scalpel blade.  Given the location of the defect, shape of the defect and the proximity to free margins an O-L flap was deemed most appropriate.  Using a sterile surgical marker, an appropriate advancement flap was drawn incorporating the defect and placing the expected incisions within the relaxed skin tension lines where possible.    The area thus outlined was incised deep to adipose tissue with a #15 scalpel blade.  The skin margins were undermined to an appropriate distance in all directions utilizing iris scissors.
Closure 3 Information: This tab is for additional flaps and grafts above and beyond our usual structured repairs.  Please note if you enter information here it will not currently bill and you will need to add the billing information manually.
Consent (Ear)/Introductory Paragraph: The rationale for Mohs was explained to the patient and consent was obtained. The risks, benefits and alternatives to therapy were discussed in detail. Specifically, the risks of ear deformity, infection, scarring, bleeding, prolonged wound healing, incomplete removal, allergy to anesthesia, nerve injury and recurrence were addressed. Prior to the procedure, the treatment site was clearly identified and confirmed by the patient. All components of Universal Protocol/PAUSE Rule completed.
Unique Flap 4 Text: The defect edges were debeveled with a #15 scalpel blade.  Given the location of the defect and the proximity to free margins a Banner transposition flap was deemed most appropriate.  Using a sterile surgical marker, an appropriate Banner transposition flap was drawn incorporating the defect.    The area thus outlined was incised deep to adipose tissue with a #15 scalpel blade.  The skin margins were undermined to an appropriate distance in all directions utilizing iris scissors.
Bcc Infiltrative Histology Text: There were numerous aggregates of basaloid cells demonstrating an infiltrative pattern.
Surgeon Performing Repair (Optional): Melly
Referred To Mid-Level For Closure Text (Leave Blank If You Do Not Want): After obtaining clear surgical margins the patient was sent to a mid-level provider for surgical repair.  The patient understands they will receive post-surgical care and follow-up from the mid-level provider.
Anesthesia Type: 1% lidocaine with 1:100,000 epinephrine and a 1:10 solution of 8.4% sodium bicarbonate
Unique Flap 1 Name: Myocutaneous Island pedicle Flap
Helical Rim Advancement Flap Text: The defect edges were debeveled with a #15 blade scalpel.  Given the location of the defect and the proximity to free margins (helical rim) a double helical rim advancement flap was deemed most appropriate.  Using a sterile surgical marker, the appropriate advancement flaps were drawn incorporating the defect and placing the expected incisions between the helical rim and antihelix where possible.  The area thus outlined was incised through and through with a #15 scalpel blade.  With a skin hook and iris scissors, the flaps were gently and sharply undermined and freed up.
Full Thickness Lip Wedge Repair (Flap) Text: Given the location of the defect and the proximity to free margins a full thickness wedge repair was deemed most appropriate.  Using a sterile surgical marker, the appropriate repair was drawn incorporating the defect and placing the expected incisions perpendicular to the vermilion border.  The vermilion border was also meticulously outlined to ensure appropriate reapproximation during the repair.  The area thus outlined was incised through and through with a #15 scalpel blade.  The muscularis and dermis were reaproximated with deep sutures following hemostasis. Care was taken to realign the vermilion border before proceeding with the superficial closure.  Once the vermilion was realigned the superfical and mucosal closure was finished.
Subsequent Stages Histo Method Verbiage: Using a similar technique to that described above, a thin layer of tissue was removed from all areas where tumor was visible on the previous stage.  The tissue was again oriented, mapped, dyed, and processed as above.
Split-Thickness Skin Graft Text: The defect edges were debeveled with a #15 scalpel blade.  Given the location of the defect, shape of the defect and the proximity to free margins a split thickness skin graft was deemed most appropriate.  Using a sterile surgical marker, the primary defect shape was transferred to the donor site. The split thickness graft was then harvested.  The skin graft was then placed in the primary defect and oriented appropriately.
Interpolation Flap Text: A decision was made to reconstruct the defect utilizing an interpolation axial flap and a staged reconstruction.  A telfa template was made of the defect.  This telfa template was then used to outline the interpolation flap.  The donor area for the pedicle flap was then injected with anesthesia.  The flap was excised through the skin and subcutaneous tissue down to the layer of the underlying musculature.  The interpolation flap was carefully excised within this deep plane to maintain its blood supply.  The edges of the donor site were undermined.   The donor site was closed in a primary fashion.  The pedicle was then rotated into position and sutured.  Once the tube was sutured into place, adequate blood supply was confirmed with blanching and refill.  The pedicle was then wrapped with xeroform gauze and dressed appropriately with a telfa and gauze bandage to ensure continued blood supply and protect the attached pedicle.
Z Plasty Text: The lesion was extirpated to the level of the fat with a #15 scalpel blade.  Given the location of the defect, shape of the defect and the proximity to free margins a Z-plasty was deemed most appropriate for repair.  Using a sterile surgical marker, the appropriate transposition arms of the Z-plasty were drawn incorporating the defect and placing the expected incisions within the relaxed skin tension lines where possible.    The area thus outlined was incised deep to adipose tissue with a #15 scalpel blade.  The skin margins were undermined to an appropriate distance in all directions utilizing iris scissors.  The opposing transposition arms were then transposed into place in opposite direction and anchored with interrupted buried subcutaneous sutures.
Alternatives Discussed Intro (Do Not Add Period): I discussed alternative treatments to Mohs surgery and specifically discussed the risks and benefits of
Graft Donor Site Bandage (Optional-Leave Blank If You Don't Want In Note): Aquaphor and telefa placed on wound. Pressure dressing applied to donor site
Epidermal Closure Graft Donor Site (Optional): simple interrupted
Muscle Hinge Flap Text: The defect edges were debeveled with a #15 scalpel blade.  Given the size, depth and location of the defect and the proximity to free margins a muscle hinge flap was deemed most appropriate.  Using a sterile surgical marker, an appropriate hinge flap was drawn incorporating the defect. The area thus outlined was incised with a #15 scalpel blade.  The skin margins were undermined to an appropriate distance in all directions utilizing iris scissors.
Same Histology In Subsequent Stages Text: The pattern and morphology of the tumor is as described in the first stage.
Surgeon/Pathologist Verbiage (Will Incorporate Name Of Surgeon From Intro If Not Blank): operated in two distinct and integrated capacities as the surgeon and pathologist.
Keystone Flap Text: The defect edges were debeveled with a #15 scalpel blade.  Given the location of the defect, shape of the defect a keystone flap was deemed most appropriate.  Using a sterile surgical marker, an appropriate keystone flap was drawn incorporating the defect, outlining the appropriate donor tissue and placing the expected incisions within the relaxed skin tension lines where possible. The area thus outlined was incised deep to adipose tissue with a #15 scalpel blade.  The skin margins were undermined to an appropriate distance in all directions around the primary defect and laterally outward around the flap utilizing iris scissors.
Purse String (Intermediate) Text: Given the location of the defect and the characteristics of the surrounding skin a purse string intermediate closure was deemed most appropriate.  Undermining was performed circumfirentially around the surgical defect.  A purse string suture was then placed and tightened.
Bilateral Helical Rim Advancement Flap Text: The defect edges were debeveled with a #15 blade scalpel.  Given the location of the defect and the proximity to free margins (helical rim) a bilateral helical rim advancement flap was deemed most appropriate.  Using a sterile surgical marker, the appropriate advancement flaps were drawn incorporating the defect and placing the expected incisions between the helical rim and antihelix where possible.  The area thus outlined was incised through and through with a #15 scalpel blade.  With a skin hook and iris scissors, the flaps were gently and sharply undermined and freed up.
Rotation Flap Text: The defect edges were debeveled with a #15 scalpel blade.  Given the location of the defect, shape of the defect and the proximity to free margins a rotation flap was deemed most appropriate.  Using a sterile surgical marker, an appropriate rotation flap was drawn incorporating the defect and placing the expected incisions within the relaxed skin tension lines where possible.    The area thus outlined was incised deep to adipose tissue with a #15 scalpel blade.  The skin margins were undermined to an appropriate distance in all directions utilizing iris scissors.
Paramedian Forehead Flap Text: A decision was made to reconstruct the defect utilizing an interpolation axial flap and a staged reconstruction.  A telfa template was made of the defect.  This telfa template was then used to outline the paramedian forehead pedicle flap.  The donor area for the pedicle flap was then injected with anesthesia.  The flap was excised through the skin and subcutaneous tissue down to the layer of the underlying musculature.  The pedicle flap was carefully excised within this deep plane to maintain its blood supply.  The edges of the donor site were undermined.   The donor site was closed in a primary fashion.  The pedicle was then rotated into position and sutured.  Once the tube was sutured into place, adequate blood supply was confirmed with blanching and refill.  The pedicle was then wrapped with xeroform gauze and dressed appropriately with a telfa and gauze bandage to ensure continued blood supply and protect the attached pedicle.
Island Pedicle Flap-Requiring Vessel Identification Text: The defect edges were debeveled with a #15 scalpel blade.  Given the location of the defect, shape of the defect and the proximity to free margins an island pedicle advancement flap was deemed most appropriate.  Using a sterile surgical marker, an appropriate advancement flap was drawn, based on the axial vessel mentioned above, incorporating the defect, outlining the appropriate donor tissue and placing the expected incisions within the relaxed skin tension lines where possible.    The area thus outlined was incised deep to adipose tissue with a #15 scalpel blade.  The skin margins were undermined to an appropriate distance in all directions around the primary defect and laterally outward around the island pedicle utilizing iris scissors.  There was minimal undermining beneath the pedicle flap.
Referred To Plastics For Closure Text (Leave Blank If You Do Not Want): After obtaining clear surgical margins the patient was sent to plastics for surgical repair.  The patient understands they will receive post-surgical care and follow-up from the referring physician's office.
Crescentic Advancement Flap Text: The defect edges were debeveled with a #15 scalpel blade.  Given the location of the defect and the proximity to free margins a crescentic advancement flap was deemed most appropriate.  Using a sterile surgical marker, the appropriate advancement flap was drawn incorporating the defect and placing the expected incisions within the relaxed skin tension lines where possible.    The area thus outlined was incised deep to adipose tissue with a #15 scalpel blade.  The skin margins were undermined to an appropriate distance in all directions utilizing iris scissors.
Graft Donor Site Epidermal Sutures (Optional): 5-0 Ethibond
Consent 3/Introductory Paragraph: I gave the patient a chance to ask questions they had about the procedure.  Following this I explained the Mohs procedure and consent was obtained. The risks, benefits and alternatives to therapy were discussed in detail. Specifically, the risks of infection, scarring, bleeding, prolonged wound healing, incomplete removal, allergy to anesthesia, nerve injury and recurrence were addressed. Prior to the procedure, the treatment site was clearly identified and confirmed by the patient. All components of Universal Protocol/PAUSE Rule completed.
Consent 1/Introductory Paragraph: The rationale for Mohs was explained to the patient and consent was obtained. The risks, benefits and alternatives to therapy were discussed in detail. Specifically, the risks of infection, scarring, bleeding, prolonged wound healing, incomplete removal, allergy to anesthesia, nerve injury and recurrence were addressed. Prior to the procedure, the treatment site was clearly identified and confirmed by the patient. All components of Universal Protocol/PAUSE Rule completed.
Cartilage Graft Text: The defect edges were debeveled with a #15 scalpel blade.  Given the location of the defect, shape of the defect, the fact the defect involved a full thickness cartilage defect a cartilage graft was deemed most appropriate.  An appropriate donor site was identified, cleansed, and anesthetized. The cartilage graft was then harvested and transferred to the recipient site, oriented appropriately and then sutured into place.  The secondary defect was then repaired using a primary closure.
Skin Substitute Text: The defect edges were debeveled with a #15 scalpel blade.  Given the location of the defect, shape of the defect and the proximity to free margins a skin substitute graft was deemed most appropriate.  The graft material was trimmed to fit the size of the defect. The graft was then placed in the primary defect and oriented appropriately.
O-T Advancement Flap Text: The defect edges were debeveled with a #15 scalpel blade.  Given the location of the defect, shape of the defect and the proximity to free margins an O-T advancement flap was deemed most appropriate.  Using a sterile surgical marker, an appropriate advancement flap was drawn incorporating the defect and placing the expected incisions within the relaxed skin tension lines where possible.    The area thus outlined was incised deep to adipose tissue with a #15 scalpel blade.  The skin margins were undermined to an appropriate distance in all directions utilizing iris scissors.
O-Z Plasty Text: The defect edges were debeveled with a #15 scalpel blade.  Given the location of the defect, shape of the defect and the proximity to free margins an O-Z plasty (double transposition flap) was deemed most appropriate.  Using a sterile surgical marker, the appropriate transposition flaps were drawn incorporating the defect and placing the expected incisions within the relaxed skin tension lines where possible.    The area thus outlined was incised deep to adipose tissue with a #15 scalpel blade.  The skin margins were undermined to an appropriate distance in all directions utilizing iris scissors.  Hemostasis was achieved with electrocautery.  The flaps were then transposed into place, one clockwise and the other counterclockwise, and anchored with interrupted buried subcutaneous sutures.
Wound Care: Aquaphor
Advancement-Rotation Flap Text: The defect edges were debeveled with a #15 scalpel blade.  Given the location of the defect, shape of the defect and the proximity to free margins an advancement-rotation flap was deemed most appropriate.  Using a sterile surgical marker, an appropriate flap was drawn incorporating the defect and placing the expected incisions within the relaxed skin tension lines where possible. The area thus outlined was incised deep to adipose tissue with a #15 scalpel blade.  The skin margins were undermined to an appropriate distance in all directions utilizing iris scissors.
Mohs Rapid Report Verbiage: The area of clinically evident tumor was marked with skin marking ink and appropriately hatched.  The initial incision was made following the Mohs approach through the skin.  The specimen was taken to the lab, divided into the necessary number of pieces, chromacoded and processed according to the Mohs protocol.  This was repeated in successive stages until a tumor free defect was achieved.
Alar Island Pedicle Flap Text: The defect edges were debeveled with a #15 scalpel blade.  Given the location of the defect, shape of the defect and the proximity to the alar rim an island pedicle advancement flap was deemed most appropriate.  Using a sterile surgical marker, an appropriate advancement flap was drawn incorporating the defect, outlining the appropriate donor tissue and placing the expected incisions within the nasal ala running parallel to the alar rim. The area thus outlined was incised with a #15 scalpel blade.  The skin margins were undermined minimally to an appropriate distance in all directions around the primary defect and laterally outward around the island pedicle utilizing iris scissors.  There was minimal undermining beneath the pedicle flap.
Estimated Blood Loss (Cc): less than 5 cc
Unique Flap 1 Text: A decision was made to reconstruct the defect utilizing a myocutaneous Island pedicle Flap based on the levator labii superioris muscle.  A telfa template was made of the defect.  This telfa template was then used to outline the myocutaneous flap, based along the meilolabial fold.  The donor area for the pedicle flap was then injected with anesthesia.  The flap was excised through the skin and subcutaneous tissue down to the layer of the underlying musculature.  The myocutaneous flap was carefully excised within this deep plane to maintain its blood supply. Based on the muscle. The edges of the donor site were undermined.   The donor site was closed in a primary fashion to the point of transposition.  The pedicle was then transposed into position and sutured.  Once the flap was sutured into place, adequate blood supply was confirmed with blanching and refill.
Consent (Lip)/Introductory Paragraph: The rationale for Mohs was explained to the patient and consent was obtained. The risks, benefits and alternatives to therapy were discussed in detail. Specifically, the risks of lip deformity, changes in the oral aperture, infection, scarring, bleeding, prolonged wound healing, incomplete removal, allergy to anesthesia, nerve injury and recurrence were addressed. Prior to the procedure, the treatment site was clearly identified and confirmed by the patient. All components of Universal Protocol/PAUSE Rule completed.
Inflammation Suggestive Of Cancer Camouflage Histology Text: There was a dense lymphocytic infiltrate which prevented adequate histologic evaluation of adjacent structures.
H Plasty Text: Given the location of the defect, shape of the defect and the proximity to free margins a H-plasty was deemed most appropriate for repair.  Using a sterile surgical marker, the appropriate advancement arms of the H-plasty were drawn incorporating the defect and placing the expected incisions within the relaxed skin tension lines where possible. The area thus outlined was incised deep to adipose tissue with a #15 scalpel blade. The skin margins were undermined to an appropriate distance in all directions utilizing iris scissors.  The opposing advancement arms were then advanced into place in opposite direction and anchored with interrupted buried subcutaneous sutures.
Closure 2 Information: This tab is for additional flaps and grafts, including complex repair and grafts and complex repair and flaps. You can also specify a different location for the additional defect, if the location is the same you do not need to select a new one. We will insert the automated text for the repair you select below just as we do for solitary flaps and grafts. Please note that at this time if you select a location with a different insurance zone you will need to override the ICD10 and CPT if appropriate.
V-Y Plasty Text: The defect edges were debeveled with a #15 scalpel blade.  Given the location of the defect, shape of the defect and the proximity to free margins an V-Y advancement flap was deemed most appropriate.  Using a sterile surgical marker, an appropriate advancement flap was drawn incorporating the defect and placing the expected incisions within the relaxed skin tension lines where possible.    The area thus outlined was incised deep to adipose tissue with a #15 scalpel blade.  The skin margins were undermined to an appropriate distance in all directions utilizing iris scissors.
Xenograft Text: The defect edges were debeveled with a #15 scalpel blade.  Given the location of the defect, shape of the defect and the proximity to free margins a xenograft was deemed most appropriate.  The graft was then trimmed to fit the size of the defect.  The graft was then placed in the primary defect and oriented appropriately.
Dressing (No Sutures): dry sterile dressing
Suture Removal: 7 days
Cheiloplasty (Complex) Text: A decision was made to reconstruct the defect with a  cheiloplasty.  The defect was undermined extensively.  Additional obicularis oris muscle was excised with a 15 blade scalpel.  The defect was converted into a full thickness wedge to facilite a better cosmetic result.  Small vessels were then tied off with 5-0 monocyrl. The obicularis oris, superficial fascia, adipose and dermis were then reapproximated.  After the deeper layers were approximated the epidermis was reapproximated with particular care given to realign the vermilion border.
Bilobed Flap Text: The defect edges were debeveled with a #15 scalpel blade.  Given the location of the defect and the proximity to free margins a bilobe flap was deemed most appropriate.  Using a sterile surgical marker, an appropriate bilobe flap drawn around the defect.    The area thus outlined was incised deep to adipose tissue with a #15 scalpel blade.  The skin margins were undermined to an appropriate distance in all directions utilizing iris scissors.
Localized Dermabrasion With Wire Brush Text: The patient was draped in routine manner.  Localized dermabrasion using 3 x 17 mm wire brush was performed in routine manner to papillary dermis. This spot dermabrasion is being performed to complete skin cancer reconstruction. It also will eliminate the other sun damaged precancerous cells that are known to be part of the regional effect of a lifetime's worth of sun exposure. This localized dermabrasion is therapeutic and should not be considered cosmetic in any regard.
No Repair - Repaired With Adjacent Surgical Defect Text (Leave Blank If You Do Not Want): After obtaining clear surgical margins the defect was repaired concurrently with another surgical defect which was in close approximation.
Cheek-To-Nose Interpolation Flap Text: A decision was made to reconstruct the defect utilizing an interpolation axial flap and a staged reconstruction.  A telfa template was made of the defect.  This telfa template was then used to outline the Cheek-To-Nose Interpolation flap.  The donor area for the pedicle flap was then injected with anesthesia.  The flap was excised through the skin and subcutaneous tissue down to the layer of the underlying musculature.  The interpolation flap was carefully excised within this deep plane to maintain its blood supply.  The edges of the donor site were undermined.   The donor site was closed in a primary fashion.  The pedicle was then rotated into position and sutured.  Once the tube was sutured into place, adequate blood supply was confirmed with blanching and refill.  The pedicle was then wrapped with xeroform gauze and dressed appropriately with a telfa and gauze bandage to ensure continued blood supply and protect the attached pedicle.
Complex Repair And Graft Additional Text (Will Appearing After The Standard Complex Repair Text): The complex repair was not sufficient to completely close the primary defect. The remaining additional defect was repaired with the graft mentioned below.

## 2018-05-09 ENCOUNTER — ANTICOAGULATION MONITORING (OUTPATIENT)
Dept: VASCULAR LAB | Facility: MEDICAL CENTER | Age: 83
End: 2018-05-09

## 2018-05-09 DIAGNOSIS — I48.91 ATRIAL FIBRILLATION, UNSPECIFIED TYPE (HCC): ICD-10-CM

## 2018-05-09 NOTE — PROGRESS NOTES
Pt's been using Xarelto 20mg QD.    Lab Results   Component Value Date/Time    CREATININE 1.17 02/26/2018 11:55 AM      CrCl ~ 45ml/min    Called pt. Left ms for pt to call back.   At this point would probably be best to do a repeat creatinine to determine if last CrCl was correct, and if so as it's <50ml/min, will want to reduced the Xarelto dose to 15mg QD.     Jasmyne Macdonald, PharmD

## 2018-05-11 ENCOUNTER — TELEPHONE (OUTPATIENT)
Dept: VASCULAR LAB | Facility: MEDICAL CENTER | Age: 83
End: 2018-05-11

## 2018-05-11 NOTE — TELEPHONE ENCOUNTER
Called again and left msg for pt to call back.   Would ideally like to repeat Creatinine labs to verify that the result is still elevated and thus causing a need in change in Xarelto dose. Creatinine's prior to this were all stable.     Results for SHAILESH BYRD (MRN [id number]) as of 5/11/2018 08:44   Ref. Range 2/28/2017 06:37 5/22/2017 15:18 8/7/2017 08:45 9/28/2017 21:06 2/26/2018 11:55   Creatinine Latest Ref Range: 0.50 - 1.40 mg/dL 0.70 0.97 0.74 0.62 1.17     Jasmyne Macdonald, PharmD

## 2018-05-14 ENCOUNTER — APPOINTMENT (RX ONLY)
Dept: URBAN - METROPOLITAN AREA CLINIC 36 | Facility: CLINIC | Age: 83
Setting detail: DERMATOLOGY
End: 2018-05-14

## 2018-05-14 DIAGNOSIS — Z48.02 ENCOUNTER FOR REMOVAL OF SUTURES: ICD-10-CM

## 2018-05-14 PROCEDURE — ? SUTURE REMOVAL (GLOBAL PERIOD)

## 2018-05-14 PROCEDURE — 99024 POSTOP FOLLOW-UP VISIT: CPT

## 2018-05-14 ASSESSMENT — LOCATION ZONE DERM: LOCATION ZONE: FACE

## 2018-05-14 ASSESSMENT — LOCATION SIMPLE DESCRIPTION DERM: LOCATION SIMPLE: LEFT FOREHEAD

## 2018-05-14 ASSESSMENT — LOCATION DETAILED DESCRIPTION DERM: LOCATION DETAILED: LEFT INFERIOR FOREHEAD

## 2018-05-14 NOTE — PROCEDURE: SUTURE REMOVAL (GLOBAL PERIOD)
Detail Level: Detailed
Body Location Override (Optional - Billing Will Still Be Based On Selected Body Map Location If Applicable): left lateral eyebrow
Add 53859 Cpt? (Important Note: In 2017 The Use Of 41030 Is Being Tracked By Cms To Determine Future Global Period Reimbursement For Global Periods): yes

## 2018-05-16 ENCOUNTER — TELEPHONE (OUTPATIENT)
Dept: VASCULAR LAB | Facility: MEDICAL CENTER | Age: 83
End: 2018-05-16

## 2018-05-16 DIAGNOSIS — D68.59 PROTEIN S DEFICIENCY (HCC): Primary | ICD-10-CM

## 2018-05-16 NOTE — TELEPHONE ENCOUNTER
Called and left another msg for pt.  At this point I went ahead and sent the reduced dose of Xarelto 15mg QD to the pharmacy, as I haven't been able to reach pt.     Jasmyne Macdonald, PharmD

## 2018-05-16 NOTE — TELEPHONE ENCOUNTER
Pt called back.   Prefers to retest BMP to check for kidney fnx.  Discontinued the Xarelto 15mg dose at this time.   She will go to the lab soon, denies any s/s bleeding.     Jasmyne Macdonald, PerlitaD

## 2018-05-18 ENCOUNTER — HOSPITAL ENCOUNTER (OUTPATIENT)
Dept: LAB | Facility: MEDICAL CENTER | Age: 83
End: 2018-05-18
Attending: INTERNAL MEDICINE
Payer: MEDICARE

## 2018-05-18 DIAGNOSIS — D68.59 PROTEIN S DEFICIENCY (HCC): ICD-10-CM

## 2018-05-18 LAB
ANION GAP SERPL CALC-SCNC: 7 MMOL/L (ref 0–11.9)
BUN SERPL-MCNC: 20 MG/DL (ref 8–22)
CALCIUM SERPL-MCNC: 9.2 MG/DL (ref 8.5–10.5)
CHLORIDE SERPL-SCNC: 107 MMOL/L (ref 96–112)
CO2 SERPL-SCNC: 28 MMOL/L (ref 20–33)
CREAT SERPL-MCNC: 0.8 MG/DL (ref 0.5–1.4)
GLUCOSE SERPL-MCNC: 100 MG/DL (ref 65–99)
POTASSIUM SERPL-SCNC: 4.1 MMOL/L (ref 3.6–5.5)
SODIUM SERPL-SCNC: 142 MMOL/L (ref 135–145)

## 2018-05-18 PROCEDURE — 80048 BASIC METABOLIC PNL TOTAL CA: CPT

## 2018-05-18 PROCEDURE — 36415 COLL VENOUS BLD VENIPUNCTURE: CPT

## 2018-05-24 ENCOUNTER — ANTICOAGULATION MONITORING (OUTPATIENT)
Dept: VASCULAR LAB | Facility: MEDICAL CENTER | Age: 83
End: 2018-05-24

## 2018-05-24 DIAGNOSIS — D68.59 PROTEIN S DEFICIENCY (HCC): ICD-10-CM

## 2018-05-24 NOTE — PROGRESS NOTES
Lab Results   Component Value Date/Time    CREATININE 0.80 05/18/2018 03:48 PM      77.2 kg (170 lb 3.1 oz)    CrCl ~ 52 ml/min    Xarelto dosed to 20mg QD, sent prescription.     Notified pt, repeat labs in 6 months.    Jasmyne Macdonald, PharmD

## 2018-06-01 ENCOUNTER — HOSPITAL ENCOUNTER (OUTPATIENT)
Dept: PAIN MANAGEMENT | Facility: REHABILITATION | Age: 83
End: 2018-06-01
Attending: PHYSICAL MEDICINE & REHABILITATION
Payer: MEDICARE

## 2018-06-01 ENCOUNTER — HOSPITAL ENCOUNTER (OUTPATIENT)
Dept: RADIOLOGY | Facility: REHABILITATION | Age: 83
End: 2018-06-01
Attending: PHYSICAL MEDICINE & REHABILITATION
Payer: MEDICARE

## 2018-06-01 VITALS
HEIGHT: 63 IN | WEIGHT: 167.55 LBS | SYSTOLIC BLOOD PRESSURE: 138 MMHG | BODY MASS INDEX: 29.69 KG/M2 | OXYGEN SATURATION: 94 % | TEMPERATURE: 96.7 F | HEART RATE: 59 BPM | DIASTOLIC BLOOD PRESSURE: 66 MMHG | RESPIRATION RATE: 16 BRPM

## 2018-06-01 PROCEDURE — 64493 INJ PARAVERT F JNT L/S 1 LEV: CPT

## 2018-06-01 PROCEDURE — 64494 INJ PARAVERT F JNT L/S 2 LEV: CPT

## 2018-06-01 PROCEDURE — 700111 HCHG RX REV CODE 636 W/ 250 OVERRIDE (IP)

## 2018-06-01 PROCEDURE — 700117 HCHG RX CONTRAST REV CODE 255

## 2018-06-01 PROCEDURE — 700101 HCHG RX REV CODE 250

## 2018-06-01 RX ORDER — LIDOCAINE HYDROCHLORIDE 20 MG/ML
INJECTION, SOLUTION EPIDURAL; INFILTRATION; INTRACAUDAL; PERINEURAL
Status: COMPLETED
Start: 2018-06-01 | End: 2018-06-01

## 2018-06-01 RX ORDER — TRIAMCINOLONE ACETONIDE 40 MG/ML
INJECTION, SUSPENSION INTRA-ARTICULAR; INTRAMUSCULAR
Status: COMPLETED
Start: 2018-06-01 | End: 2018-06-01

## 2018-06-01 RX ADMIN — LIDOCAINE HYDROCHLORIDE 1 ML: 20 INJECTION, SOLUTION EPIDURAL; INFILTRATION; INTRACAUDAL; PERINEURAL at 09:00

## 2018-06-01 RX ADMIN — LIDOCAINE HYDROCHLORIDE 1 ML: 20 INJECTION, SOLUTION EPIDURAL; INFILTRATION; INTRACAUDAL; PERINEURAL at 08:54

## 2018-06-01 RX ADMIN — TRIAMCINOLONE ACETONIDE 40 MG: 40 INJECTION, SUSPENSION INTRA-ARTICULAR; INTRAMUSCULAR at 08:54

## 2018-06-01 RX ADMIN — LIDOCAINE HYDROCHLORIDE 3 ML: 20 INJECTION, SOLUTION EPIDURAL; INFILTRATION; INTRACAUDAL; PERINEURAL at 08:49

## 2018-06-01 RX ADMIN — IOHEXOL 2 ML: 240 INJECTION, SOLUTION INTRATHECAL; INTRAVASCULAR; INTRAVENOUS; ORAL at 08:52

## 2018-06-01 RX ADMIN — TRIAMCINOLONE ACETONIDE 40 MG: 40 INJECTION, SUSPENSION INTRA-ARTICULAR; INTRAMUSCULAR at 09:00

## 2018-06-01 ASSESSMENT — PAIN SCALES - GENERAL
PAINLEVEL_OUTOF10: 4
PAINLEVEL_OUTOF10: 0

## 2018-06-01 NOTE — PROCEDURES
DATE OF SERVICE:  06/01/2018    NAME OF PROCEDURES:  1.  Left L4-L5 and L5-S1 facet joint injections with 20 mg of Kenalog under   fluoroscopic guidance.  2.  Right L4-L5 and L5-S1 facet joint injections with 20 mg of Kenalog under   fluoroscopic guidance.    INDICATION:  This is a patient of mine with persistent low back pain felt to   be due to lumbar spondylosis and facet joint inflammation.  For this reason,   facet injections were chosen for today's procedure.    DESCRIPTION OF PROCEDURE:  After appropriate informed consent was obtained,   the patient was placed prone on the table.  Her skin was thoroughly cleansed   with Betadine swabs x3, then subcutaneous intramuscular, interligamentous   region was anesthetized with lidocaine.  A 3-1/2-inch 22-gauge spinal needle   was directed under intermittent fluoroscopic guidance to the left L4-L5 and   L5-S1 facet joints followed by the right L4-L5 and L5-S1 facet joints.    ARTHROGRAM:  Once the joints were felt to have been cannulated, a small amount   of Omnipaque was placed.  This confirmed needle tip placement by darkening   the joint lines.  A solution of 20 mg of Kenalog along with 2% lidocaine for   up to 1 mL of injected solution was placed into each of the joints.  She   tolerated the procedure well without procedural complications.  She will be   referred to the recovery and followup in the office in the next week to   monitor the response to injection.       ____________________________________     ALFONSO MARTINEZ MD    AT / [facility]    DD:  06/01/2018 09:51:11  DT:  06/01/2018 10:25:28    D#:  [id number]  Job#:  [id number]    cc: Eric Cook MD

## 2018-06-01 NOTE — PROGRESS NOTES
Current medications reviewed with pt, see medications reconciliation form. Pt yaquelin taking ASA or other blood thinners or anti-inflammatories.  Pt has a ride post-procedure [facility].  Printed and verbal discharge instructions given to pt who verbalized understanding.

## 2018-06-01 NOTE — PROGRESS NOTES
Positioned patient by CST, RN, . Both lower legs & feet pillow placed for support. Hands supported on stool under head of the bed. Procedure tolerated well by patient. Accompanied to recovery room, ambulatory.

## 2018-06-18 ENCOUNTER — TELEPHONE (OUTPATIENT)
Dept: RHEUMATOLOGY | Facility: PHYSICIAN GROUP | Age: 83
End: 2018-06-18

## 2018-06-18 DIAGNOSIS — M06.9 RHEUMATOID ARTHRITIS INVOLVING MULTIPLE SITES, UNSPECIFIED RHEUMATOID FACTOR PRESENCE: ICD-10-CM

## 2018-06-18 NOTE — TELEPHONE ENCOUNTER
Pt called needing a refill on her Xeljanz to [facility]. I dont see this in her current med list but she stated she takes the 5mg BID.       Was the patient seen in the last year in this department? Yes   May lab    Does patient have an active prescription for medications requested? No     Received Request Via: Patient

## 2018-07-07 ENCOUNTER — HOSPITAL ENCOUNTER (OUTPATIENT)
Dept: LAB | Facility: MEDICAL CENTER | Age: 83
End: 2018-07-07
Attending: INTERNAL MEDICINE
Payer: MEDICARE

## 2018-07-07 DIAGNOSIS — M06.9 RHEUMATOID ARTHRITIS INVOLVING MULTIPLE SITES, UNSPECIFIED RHEUMATOID FACTOR PRESENCE: ICD-10-CM

## 2018-07-07 LAB
ALBUMIN SERPL BCP-MCNC: 3.9 G/DL (ref 3.2–4.9)
ALBUMIN/GLOB SERPL: 1.3 G/DL
ALP SERPL-CCNC: 41 U/L (ref 30–99)
ALT SERPL-CCNC: 23 U/L (ref 2–50)
ANION GAP SERPL CALC-SCNC: 6 MMOL/L (ref 0–11.9)
AST SERPL-CCNC: 25 U/L (ref 12–45)
BASOPHILS # BLD AUTO: 0.7 % (ref 0–1.8)
BASOPHILS # BLD: 0.04 K/UL (ref 0–0.12)
BILIRUB SERPL-MCNC: 0.8 MG/DL (ref 0.1–1.5)
BUN SERPL-MCNC: 21 MG/DL (ref 8–22)
CALCIUM SERPL-MCNC: 8.7 MG/DL (ref 8.5–10.5)
CHLORIDE SERPL-SCNC: 104 MMOL/L (ref 96–112)
CO2 SERPL-SCNC: 28 MMOL/L (ref 20–33)
CREAT SERPL-MCNC: 0.75 MG/DL (ref 0.5–1.4)
EOSINOPHIL # BLD AUTO: 0.06 K/UL (ref 0–0.51)
EOSINOPHIL NFR BLD: 1 % (ref 0–6.9)
ERYTHROCYTE [DISTWIDTH] IN BLOOD BY AUTOMATED COUNT: 51.8 FL (ref 35.9–50)
ERYTHROCYTE [SEDIMENTATION RATE] IN BLOOD BY WESTERGREN METHOD: 11 MM/HOUR (ref 0–30)
GLOBULIN SER CALC-MCNC: 2.9 G/DL (ref 1.9–3.5)
GLUCOSE SERPL-MCNC: 100 MG/DL (ref 65–99)
HCT VFR BLD AUTO: 44.5 % (ref 37–47)
HGB BLD-MCNC: 14.4 G/DL (ref 12–16)
IMM GRANULOCYTES # BLD AUTO: 0.02 K/UL (ref 0–0.11)
IMM GRANULOCYTES NFR BLD AUTO: 0.3 % (ref 0–0.9)
LYMPHOCYTES # BLD AUTO: 1.3 K/UL (ref 1–4.8)
LYMPHOCYTES NFR BLD: 22.1 % (ref 22–41)
MCH RBC QN AUTO: 31.5 PG (ref 27–33)
MCHC RBC AUTO-ENTMCNC: 32.4 G/DL (ref 33.6–35)
MCV RBC AUTO: 97.4 FL (ref 81.4–97.8)
MONOCYTES # BLD AUTO: 0.48 K/UL (ref 0–0.85)
MONOCYTES NFR BLD AUTO: 8.2 % (ref 0–13.4)
NEUTROPHILS # BLD AUTO: 3.98 K/UL (ref 2–7.15)
NEUTROPHILS NFR BLD: 67.7 % (ref 44–72)
NRBC # BLD AUTO: 0 K/UL
NRBC BLD-RTO: 0 /100 WBC
PLATELET # BLD AUTO: 202 K/UL (ref 164–446)
PMV BLD AUTO: 11.4 FL (ref 9–12.9)
POTASSIUM SERPL-SCNC: 4.4 MMOL/L (ref 3.6–5.5)
PROT SERPL-MCNC: 6.8 G/DL (ref 6–8.2)
RBC # BLD AUTO: 4.57 M/UL (ref 4.2–5.4)
SODIUM SERPL-SCNC: 138 MMOL/L (ref 135–145)
WBC # BLD AUTO: 5.9 K/UL (ref 4.8–10.8)

## 2018-07-07 PROCEDURE — 85652 RBC SED RATE AUTOMATED: CPT

## 2018-07-07 PROCEDURE — 36415 COLL VENOUS BLD VENIPUNCTURE: CPT

## 2018-07-07 PROCEDURE — 80053 COMPREHEN METABOLIC PANEL: CPT

## 2018-07-07 PROCEDURE — 85025 COMPLETE CBC W/AUTO DIFF WBC: CPT

## 2018-07-18 DIAGNOSIS — M06.9 RHEUMATOID ARTHRITIS INVOLVING MULTIPLE SITES, UNSPECIFIED RHEUMATOID FACTOR PRESENCE: ICD-10-CM

## 2018-07-18 NOTE — TELEPHONE ENCOUNTER
Was the patient seen in the last year in this department? Yes   July [facility]     Does patient have an active prescription for medications requested? No     Received Request Via: Patient

## 2018-08-15 ENCOUNTER — PATIENT OUTREACH (OUTPATIENT)
Dept: HEALTH INFORMATION MANAGEMENT | Facility: OTHER | Age: 83
End: 2018-08-15

## 2018-08-15 ENCOUNTER — APPOINTMENT (RX ONLY)
Dept: URBAN - METROPOLITAN AREA CLINIC 4 | Facility: CLINIC | Age: 83
Setting detail: DERMATOLOGY
End: 2018-08-15

## 2018-08-15 DIAGNOSIS — Z71.89 OTHER SPECIFIED COUNSELING: ICD-10-CM

## 2018-08-15 DIAGNOSIS — D22 MELANOCYTIC NEVI: ICD-10-CM

## 2018-08-15 DIAGNOSIS — Z85.828 PERSONAL HISTORY OF OTHER MALIGNANT NEOPLASM OF SKIN: ICD-10-CM

## 2018-08-15 DIAGNOSIS — L72.0 EPIDERMAL CYST: ICD-10-CM

## 2018-08-15 DIAGNOSIS — L30.4 ERYTHEMA INTERTRIGO: ICD-10-CM

## 2018-08-15 DIAGNOSIS — Z86.006 PERSONAL HISTORY OF MELANOMA IN-SITU: ICD-10-CM

## 2018-08-15 DIAGNOSIS — D18.0 HEMANGIOMA: ICD-10-CM

## 2018-08-15 DIAGNOSIS — L82.1 OTHER SEBORRHEIC KERATOSIS: ICD-10-CM

## 2018-08-15 DIAGNOSIS — L57.0 ACTINIC KERATOSIS: ICD-10-CM

## 2018-08-15 DIAGNOSIS — L81.4 OTHER MELANIN HYPERPIGMENTATION: ICD-10-CM

## 2018-08-15 DIAGNOSIS — Z85.820 PERSONAL HISTORY OF MALIGNANT MELANOMA OF SKIN: ICD-10-CM

## 2018-08-15 DIAGNOSIS — Z87.2 PERSONAL HISTORY OF DISEASES OF THE SKIN AND SUBCUTANEOUS TISSUE: ICD-10-CM

## 2018-08-15 PROBLEM — D18.01 HEMANGIOMA OF SKIN AND SUBCUTANEOUS TISSUE: Status: ACTIVE | Noted: 2018-08-15

## 2018-08-15 PROBLEM — D48.5 NEOPLASM OF UNCERTAIN BEHAVIOR OF SKIN: Status: ACTIVE | Noted: 2018-08-15

## 2018-08-15 PROBLEM — D22.5 MELANOCYTIC NEVI OF TRUNK: Status: ACTIVE | Noted: 2018-08-15

## 2018-08-15 PROCEDURE — ? ADDITIONAL NOTES

## 2018-08-15 PROCEDURE — ? LIQUID NITROGEN

## 2018-08-15 PROCEDURE — ? COUNSELING

## 2018-08-15 PROCEDURE — 17003 DESTRUCT PREMALG LES 2-14: CPT

## 2018-08-15 PROCEDURE — 99213 OFFICE O/P EST LOW 20 MIN: CPT | Mod: 25

## 2018-08-15 PROCEDURE — 11100: CPT | Mod: 59

## 2018-08-15 PROCEDURE — ? PRESCRIPTION

## 2018-08-15 PROCEDURE — 17000 DESTRUCT PREMALG LESION: CPT

## 2018-08-15 PROCEDURE — ? BIOPSY BY SHAVE METHOD

## 2018-08-15 RX ORDER — NYSTATIN AND TRIAMCINOLONE ACETONIDE 100000; 1 [USP'U]/G; MG/G
CREAM TOPICAL TWICE A DAY
Qty: 1 | Refills: 0 | Status: ERX

## 2018-08-15 ASSESSMENT — LOCATION SIMPLE DESCRIPTION DERM
LOCATION SIMPLE: NOSE
LOCATION SIMPLE: POSTERIOR NECK
LOCATION SIMPLE: RIGHT WRIST
LOCATION SIMPLE: LEFT EYEBROW
LOCATION SIMPLE: LEFT PRETIBIAL REGION
LOCATION SIMPLE: LEFT EAR
LOCATION SIMPLE: LEFT INFERIOR EYELID
LOCATION SIMPLE: LEFT CHEEK
LOCATION SIMPLE: LEFT UPPER BACK
LOCATION SIMPLE: LEFT BREAST
LOCATION SIMPLE: RIGHT EYELID
LOCATION SIMPLE: RIGHT CLAVICULAR SKIN
LOCATION SIMPLE: RIGHT ANTERIOR NECK
LOCATION SIMPLE: RIGHT HAND
LOCATION SIMPLE: RIGHT CHEEK
LOCATION SIMPLE: RIGHT BREAST
LOCATION SIMPLE: ABDOMEN
LOCATION SIMPLE: CHEST

## 2018-08-15 ASSESSMENT — LOCATION DETAILED DESCRIPTION DERM
LOCATION DETAILED: MID POSTERIOR NECK
LOCATION DETAILED: RIGHT LATERAL BREAST 6-7:00 REGION
LOCATION DETAILED: NASAL DORSUM
LOCATION DETAILED: RIGHT LATERAL DORSAL WRIST
LOCATION DETAILED: LEFT RIB CAGE
LOCATION DETAILED: LEFT MID PREAURICULAR CHEEK
LOCATION DETAILED: LEFT LATERAL SUPERIOR CHEST
LOCATION DETAILED: RIGHT CLAVICULAR SKIN
LOCATION DETAILED: LEFT MEDIAL BREAST 7-8:00 REGION
LOCATION DETAILED: LEFT MEDIAL UPPER BACK
LOCATION DETAILED: LEFT INFERIOR MEDIAL MALAR CHEEK
LOCATION DETAILED: RIGHT ULNAR DORSAL HAND
LOCATION DETAILED: LEFT LATERAL INFERIOR EYELID
LOCATION DETAILED: RIGHT MEDIAL CANTHUS
LOCATION DETAILED: EPIGASTRIC SKIN
LOCATION DETAILED: LEFT CENTRAL EYEBROW
LOCATION DETAILED: LEFT ANTIHELIX
LOCATION DETAILED: LEFT LATERAL EYEBROW
LOCATION DETAILED: RIGHT INFERIOR ANTERIOR NECK
LOCATION DETAILED: RIGHT SUPERIOR MEDIAL MALAR CHEEK
LOCATION DETAILED: LEFT DISTAL PRETIBIAL REGION

## 2018-08-15 ASSESSMENT — LOCATION ZONE DERM
LOCATION ZONE: HAND
LOCATION ZONE: NOSE
LOCATION ZONE: EAR
LOCATION ZONE: LEG
LOCATION ZONE: EYELID
LOCATION ZONE: NECK
LOCATION ZONE: ARM
LOCATION ZONE: FACE
LOCATION ZONE: TRUNK

## 2018-08-15 NOTE — PROCEDURE: BIOPSY BY SHAVE METHOD
Lab: [id number]
Render Post-Care Instructions In Note?: yes
Depth Of Biopsy: dermis
Detail Level: Detailed
Size Of Lesion In Cm: 0.5
Bill For Surgical Tray: no
Billing Type: Third-Party Bill
Curettage Text: The wound bed was treated with curettage after the biopsy was performed.
Anesthesia Type: 1% lidocaine with epinephrine
Electrodesiccation Text: The wound bed was treated with electrodesiccation after the biopsy was performed.
Silver Nitrate Text: The wound bed was treated with silver nitrate after the biopsy was performed.
Type Of Destruction Used: Curettage
Biopsy Type: H and E
Hemostasis: Drysol and Electrocautery
Cryotherapy Text: The wound bed was treated with cryotherapy after the biopsy was performed.
Post-Care Instructions: I reviewed with the patient in detail post-care instructions. Patient is to keep the biopsy site dry overnight, and then apply vaseline twice daily until healed.
Lab Facility: 
Consent: Written consent was obtained and risks were reviewed including but not limited to scarring, infection, bleeding, scabbing, incomplete removal, nerve damage and allergy to anesthesia.
Notification Instructions: Patient will be notified of biopsy results. However, patient instructed to call the office if not contacted within 2 weeks.
Biopsy Method: Personna blade
Additional Anesthesia Volume In Cc (Will Not Render If 0): 0
Dressing: bandage
Wound Care: Vaseline
Electrodesiccation And Curettage Text: The wound bed was treated with electrodesiccation and curettage after the biopsy was performed.

## 2018-08-15 NOTE — HPI: MEDICATION (5-FLUOROURACIL)
How Severe Are The Lesions?: mild
Is This A New Presentation, Or A Follow-Up?: Follow Up 5-Fluorouracil Therapy
How Many Weeks Of 5-Fu Have You Completed?: 3

## 2018-08-15 NOTE — PROGRESS NOTES
Outcome: Left Message    Please transfer to Patient Outreach Team at [phone] when patient returns call.    WebIZ Checked & Epic Updated:  yes        Attempt # 1

## 2018-08-18 ENCOUNTER — HOSPITAL ENCOUNTER (OUTPATIENT)
Dept: RADIOLOGY | Facility: MEDICAL CENTER | Age: 83
End: 2018-08-18
Attending: PHYSICAL MEDICINE & REHABILITATION
Payer: MEDICARE

## 2018-08-18 DIAGNOSIS — M54.17 LUMBOSACRAL RADICULITIS: ICD-10-CM

## 2018-08-18 DIAGNOSIS — M54.5 LOW BACK PAIN, UNSPECIFIED BACK PAIN LATERALITY, UNSPECIFIED CHRONICITY, WITH SCIATICA PRESENCE UNSPECIFIED: ICD-10-CM

## 2018-08-18 DIAGNOSIS — M48.061 SPINAL STENOSIS OF LUMBAR REGION, UNSPECIFIED WHETHER NEUROGENIC CLAUDICATION PRESENT: ICD-10-CM

## 2018-08-18 DIAGNOSIS — M47.816 LUMBAR SPONDYLOSIS: ICD-10-CM

## 2018-08-18 PROCEDURE — 72148 MRI LUMBAR SPINE W/O DYE: CPT

## 2018-08-21 ENCOUNTER — APPOINTMENT (RX ONLY)
Dept: URBAN - METROPOLITAN AREA CLINIC 31 | Facility: CLINIC | Age: 83
Setting detail: DERMATOLOGY
End: 2018-08-21

## 2018-08-21 PROBLEM — C44.41 BASAL CELL CARCINOMA OF SKIN OF SCALP AND NECK: Status: ACTIVE | Noted: 2018-08-21

## 2018-08-21 PROCEDURE — ? MOHS SURGERY PHONE CONSULTATION

## 2018-08-21 NOTE — PROCEDURE: MOHS SURGERY PHONE CONSULTATION
Pathology Accession #: [id number]
Does The Patient Take Antibiotics Prior To Dental Procedures?: Yes
Time Of Mohs Surgery: 12:00
Which Antibiotic Do They Take For Surgical Prophylaxis?: Amoxicillin (2 grams)
Does The Patient Have A Living Will (Optional)?: No
Additional Information?: Pt does not need Rx sent to her pharmacy on antibiotics. She states she has some.
Office Location Of Mohs Surgery: [facility]
Patient Reported Location: Left superior parietal scalp
Referring Provider: AS
Date Of Mohs Surgery: 09/17/2018
If Yes- What Blood Thinners Do They Take?: xarelto
Patient's Insurance: Medicare
Detail Level: Simple
If Yes- Additional Details: partial left knee

## 2018-08-29 ENCOUNTER — OFFICE VISIT (OUTPATIENT)
Dept: MEDICAL GROUP | Facility: PHYSICIAN GROUP | Age: 83
End: 2018-08-29
Payer: MEDICARE

## 2018-08-29 VITALS
HEART RATE: 78 BPM | BODY MASS INDEX: 30.12 KG/M2 | SYSTOLIC BLOOD PRESSURE: 126 MMHG | RESPIRATION RATE: 14 BRPM | DIASTOLIC BLOOD PRESSURE: 82 MMHG | OXYGEN SATURATION: 95 % | HEIGHT: 63 IN | TEMPERATURE: 97.5 F | WEIGHT: 170 LBS

## 2018-08-29 DIAGNOSIS — M79.605 LEFT LEG PAIN: ICD-10-CM

## 2018-08-29 PROCEDURE — 99213 OFFICE O/P EST LOW 20 MIN: CPT | Performed by: INTERNAL MEDICINE

## 2018-08-29 ASSESSMENT — PATIENT HEALTH QUESTIONNAIRE - PHQ9: CLINICAL INTERPRETATION OF PHQ2 SCORE: 0

## 2018-08-29 NOTE — PROGRESS NOTES
PRIMARY CARE CLINIC FOLLOW UP VISIT  Chief Complaint   Patient presents with   • Leg Problem   • Numbness     bilateral leg     History of Present Illness     Left leg pain  She has been having left leg numbness and pain for the last couple months. When she wakes up sometimes it's very difficult to even walk on this leg. Also have left knee pain (has had meniscus surgery on this knee in 2013) and aching in her leg. Dr. Cooper recently did an MRI and is planning for an epidural next week. She also is returning to her orthopaedic surgeon Dr. Persaud on 9/18/2018. Also undergoing physical therapy and her therapist is concerned that her hip could also be an issue. Had an EMG done with Dr. Booth which revealed a left peroneal axonal mononeuropathy. She has increased her Lyrica to 75 mg qAM and 150 mg qPM.     Current Outpatient Prescriptions   Medication Sig Dispense Refill   • Tofacitinib Citrate 5 MG Tab Take 5 mg by mouth 2 Times a Day. 180 Tab 1   • rivaroxaban (XARELTO) 20 MG Tab tablet Take 1 Tab by mouth with dinner. 90 Tab 3   • lidocaine (LIDODERM) 5 % Patch Apply 1 Patch to skin as directed every 24 hours. 90 Patch 0   • LYRICA 75 MG Cap      • atorvastatin (LIPITOR) 10 MG Tab Take 1 Tab by mouth every day. (Patient taking differently: Take 20 mg by mouth every day.) 90 Tab 3   • omeprazole (PRILOSEC) 20 MG delayed-release capsule Take 1 Cap by mouth every day. 90 Cap 3   • acetaminophen (TYLENOL) 325 MG Tab Take 650 mg by mouth at bedtime as needed.     • vitamin D (CHOLECALCIFEROL) 1000 UNIT Tab Take 1,000 Units by mouth every day.     • POTASSIUM GLUCONATE Take 1 Tab by mouth every day.     • Calcium Carbonate-Vitamin D (CALCIUM + D PO) Take 1 Tab by mouth every day.       No current facility-administered medications for this visit.      Past Medical History:   Diagnosis Date   • Atrial fibrillation [I48.91] 4/19/2016   • Back pain 6/27/2012   • Blood clotting disorder (HCC) 2012    clot in leg   • Cancer  (HCC)     skin   • Chronic back pain greater than 3 months duration    • Deep vein thrombosis (HCC) 3/8/2016    First occurrence in [facility] in late 1970s Second occurrence further up in [facility] in , has been on AC since    • Essential hypertension, benign 2012   • GERD (gastroesophageal reflux disease) 2012   • Hiatus hernia syndrome    • Hyperlipidemia    • Hypertension    • Impaired fasting glucose 2017   • Mild aortic stenosis 2017   • Other and unspecified hyperlipidemia 2012   • Prediabetes    • Protein S deficiency (HCC) 2017    Noted in lab work  as a part of work up at [facility]    • Rheumatoid arthritis involving multiple sites with positive rheumatoid factor (HCC) 2016    Dr. Escoto   • Rheumatoid nodule (HCC) 2017   • Right bundle branch block 2012   • Urinary incontinence 2017     Past Surgical History:   Procedure Laterality Date   • INGUINAL HERNIA REPAIR Right 2016    Procedure: INGUINAL HERNIA REPAIR - PRIMARY;  Surgeon: Mary Medina M.D.;  Location: SURGERY [facility];  Service:    • OTHER  2014    peroneal nerve surgery Dr. Castro    • OTHER ORTHOPEDIC SURGERY      left knee partial   • OTHER ORTHOPEDIC SURGERY      meniscus repair   • ATHROPLASTY      partial TKA with Dr. 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 All other systems reviewed and negative.        Objective   Blood pressure 126/82, pulse 78, temperature 36.4 °C (97.5 °F), resp. rate 14, height 1.6 m (5' 3\"), weight 77.1 kg (170 lb), SpO2 95 %. Body mass index is 30.11 kg/m².    General: alert and oriented, pleasant, cooperative  HEENT: Normocephalic, atraumatic.   MSK: left midline knee scar noted, ambulates with cane   Skin: warm and dry, no lesions or rashes  Psychiatric: appropriate mood and affect. Good insight and appropriate judgment     Assessment and Plan   The following treatment plan was discussed     1. Left leg pain  Marry's left leg pain/numbness may be multifactorial including stenosis, left knee arthritis, and peroneal nerve damage. Advised that she proceed with the epidural with Dr. Cooper to see how much benefit she receives from this. Doubt Dr. Persaud would consider repeat surgery on her knee but she may be a candidate for ablation. Also discussed that she may not be 100% symptom free in that extremity which she understands.       Healthcare maintenance     Health Maintenance Due   Topic Date Due   • Annual Wellness Visit  1935   • IMM ZOSTER VACCINES (2 of 3) 11/15/2012   • IMM INFLUENZA (1) 09/01/2018       Return if symptoms worsen or fail to improve.    Eric Cook MD  Internal Medicine  [facility]                   "

## 2018-08-29 NOTE — ASSESSMENT & PLAN NOTE
She has been having left leg numbness and pain for the last couple months. When she wakes up sometimes it's very difficult to even walk on this leg. Also have left knee pain (has had meniscus surgery on this knee in 2013) and aching in her leg. Dr. Cooper recently did an MRI and is planning for an epidural next week. She also is returning to her orthopaedic surgeon Dr. Persaud on 9/18/2018. Also undergoing physical therapy and her therapist is concerned that her hip could also be an issue. Had an EMG done with Dr. Booth which revealed a left peroneal axonal mononeuropathy. She has increased her Lyrica to 75 mg qAM and 150 mg qPM.

## 2018-09-06 ENCOUNTER — OFFICE VISIT (OUTPATIENT)
Dept: RHEUMATOLOGY | Facility: PHYSICIAN GROUP | Age: 83
End: 2018-09-06
Payer: MEDICARE

## 2018-09-06 VITALS
DIASTOLIC BLOOD PRESSURE: 78 MMHG | TEMPERATURE: 98.2 F | SYSTOLIC BLOOD PRESSURE: 130 MMHG | BODY MASS INDEX: 30.82 KG/M2 | WEIGHT: 174 LBS | OXYGEN SATURATION: 95 % | HEART RATE: 88 BPM | RESPIRATION RATE: 14 BRPM

## 2018-09-06 DIAGNOSIS — C43.4 MALIGNANT MELANOMA OF NECK (HCC): ICD-10-CM

## 2018-09-06 DIAGNOSIS — R25.2 SPASM: ICD-10-CM

## 2018-09-06 DIAGNOSIS — M81.0 SENILE OSTEOPOROSIS: ICD-10-CM

## 2018-09-06 DIAGNOSIS — D68.59 PROTEIN S DEFICIENCY (HCC): ICD-10-CM

## 2018-09-06 DIAGNOSIS — M05.79 RHEUMATOID ARTHRITIS INVOLVING MULTIPLE SITES WITH POSITIVE RHEUMATOID FACTOR (HCC): ICD-10-CM

## 2018-09-06 DIAGNOSIS — R73.01 IMPAIRED FASTING GLUCOSE: ICD-10-CM

## 2018-09-06 DIAGNOSIS — Z79.622 LONG-TERM CURRENT USE OF TOFACITINIB: ICD-10-CM

## 2018-09-06 DIAGNOSIS — I10 ESSENTIAL HYPERTENSION, BENIGN: ICD-10-CM

## 2018-09-06 PROCEDURE — 99214 OFFICE O/P EST MOD 30 MIN: CPT | Performed by: INTERNAL MEDICINE

## 2018-09-06 RX ORDER — METHYLPREDNISOLONE 4 MG/1
TABLET ORAL
Qty: 90 TAB | Refills: 0 | Status: SHIPPED | OUTPATIENT
Start: 2018-09-06 | End: 2018-12-04

## 2018-09-06 RX ORDER — TIZANIDINE 4 MG/1
4 TABLET ORAL EVERY 6 HOURS PRN
Qty: 90 TAB | Refills: 0 | Status: SHIPPED | OUTPATIENT
Start: 2018-09-06 | End: 2018-09-07

## 2018-09-06 NOTE — LETTER
[facility]   [address], [address]  [place] [address]  Phone: [phone]  Fax: [phone]              Encounter Date: 9/6/2018    Dear Dr. Eaton ref. provider found,    It was a pleasure seeing your patient, Marry Mathur, on 9/6/2018. Diagnoses of Rheumatoid arthritis involving multiple sites with positive rheumatoid factor (HCC), Long-term current use of tofacitinib, Senile osteoporosis, Spasm, Protein S deficiency (HCC), Essential hypertension, benign, Malignant melanoma of neck (HCC), and Impaired fasting glucose were pertinent to this visit.     Please find attached progress note which includes the history I obtained from Ms. Mathur, my physical examination findings, my impression and recommendations.      Once again, it was a pleasure participating in your patient's care.  Please feel free to contact me if you have any questions or if I can be of any further assistance to your patients.      Sincerely,    Deanna Escoto M.D.  Electronically Signed          PROGRESS NOTE:  No notes on file

## 2018-09-06 NOTE — PROGRESS NOTES
Chief Complaint- joint pain    Subjective:   Marry Mathur is a 83 y.o. female here today for follow up of rheumatological issues    This is a follow-up visit for this patient who we see for rheumatoid arthritis who is serologically negative currently on Xeljanz 5 mg p.o. twice daily, complicating treatment is patient does have a remote history of melanoma last one about a year and a half ago.      Patient's main issues are low back pain as well as left leg pain described as neuropathy currently sees spine clinic and is scheduled to get an epidural next week.  Patient is also scheduled to see Dr. Persaud in orthopedics regarding her right knee.    Additional comorbidities include diabetes for which patient  takes metformin, also with a diagnosis of spinal stenosis with intermittent weakness in her legs with progressive numbness and weakness in the left leg.  Patient also with protein S deficiency with a history of DVT on chronic anticoagulation.  Patient also with a history of Araya's esophagus.       S/p Remicade-stopped because of hx of melanoma about 1996 and has multiple other skin cancers  S/p Orencia-lost efficacy  S/p Humira-stopped because of recurrence of melanoma October 2017  S/p MTX-stopped because of development of skin cancer/melanoma October 2017  S/p NSAIDS-relatively contraindicated as patient is on chronic anticoagulation      DEXA 3/18/2016 T scores 1.1, -1.1   FRAX 3/18/2016 major osteoporotic fracture risk 14.3%, hip fracture risks 3.7%  DEXA 3/23/2018 T scores 0.2, -1.4  FRAX 3/23/2018 major osteoporotic fracture risk 19.3%, hip fracture risk 6.1%  Echocardiogram 7/2012 [facility]  RF neg 3/2014 [facility]; RF neg 6/2014 [facility]; RF neg 6/2016  CCP neg 3/2014 [facility]; CCP neg 6/2014 [facility]; CCP neg 6/2016  Uric acid 4.7 3/2014 [facility];Uric acid 4.5 6/2016  Hep B neg 6/2016  Quantiferon Gold neg 6/2014 [facility]; Quantiferon Gold neg 6/2016  Hand x-rays  3/2016-indicate osteoarthritis  Feet x-rays 3/2016-indicate osteoarthritis        Current medicines (including changes today)  Current Outpatient Prescriptions   Medication Sig Dispense Refill   • methylPREDNISolone (MEDROL) 4 MG Tab 1 tab po qAM 90 Tab 0   • tizanidine (ZANAFLEX) 4 MG Tab Take 1 Tab by mouth every 6 hours as needed. 90 Tab 0   • Tofacitinib Citrate 5 MG Tab Take 5 mg by mouth 2 Times a Day. 180 Tab 1   • rivaroxaban (XARELTO) 20 MG Tab tablet Take 1 Tab by mouth with dinner. 90 Tab 3   • lidocaine (LIDODERM) 5 % Patch Apply 1 Patch to skin as directed every 24 hours. 90 Patch 0   • LYRICA 75 MG Cap      • atorvastatin (LIPITOR) 10 MG Tab Take 1 Tab by mouth every day. (Patient taking differently: Take 20 mg by mouth every day.) 90 Tab 3   • omeprazole (PRILOSEC) 20 MG delayed-release capsule Take 1 Cap by mouth every day. 90 Cap 3   • acetaminophen (TYLENOL) 325 MG Tab Take 650 mg by mouth at bedtime as needed.     • vitamin D (CHOLECALCIFEROL) 1000 UNIT Tab Take 1,000 Units by mouth every day.     • POTASSIUM GLUCONATE Take 1 Tab by mouth every day.     • Calcium Carbonate-Vitamin D (CALCIUM + D PO) Take 1 Tab by mouth every day.       No current facility-administered medications for this visit.      She  has a past medical history of Atrial fibrillation [I48.91] (4/19/2016); Back pain (6/27/2012); Blood clotting disorder ([facility]) (2012); Cancer ([facility]); Chronic back pain greater than 3 months duration; Deep vein thrombosis (HCC) (3/8/2016); Essential hypertension, benign (6/27/2012); GERD (gastroesophageal reflux disease) (6/27/2012); Hiatus hernia syndrome; Hyperlipidemia; Hypertension; Impaired fasting glucose (11/2/2017); Mild aortic stenosis (11/2/2017); Other and unspecified hyperlipidemia (6/27/2012); Prediabetes; Protein S deficiency ([facility]) (11/2/2017); Rheumatoid arthritis involving multiple sites with positive rheumatoid factor ([facility]) (03/14/2016); Rheumatoid nodule ([facility]) (7/25/2017); Right  bundle branch block (6/27/2012); and Urinary incontinence (11/2/2017).    ROS   Other than what is mentioned in HPI or physical exam, there is no history of headaches, double vision or blurred vision. No temporal tenderness or jaw claudication. No history of cataracts or glaucoma. No trouble swallowing difficulties or sore throats.  No chest complaints including chest pain, cough or sputum production. No GI complaints including nausea, vomiting, change in bowel habits, or past peptic ulcer disease. No history of blood in the stools. No urinary complaints including dysuria or frequency. No history of alopecia, photosensitivity, oral ulcerations, Raynaud's phenomena.       Objective:     Blood pressure 130/78, pulse 88, temperature 36.8 °C (98.2 °F), resp. rate 14, weight 78.9 kg (174 lb), SpO2 95 %. Body mass index is 30.82 kg/m².   Physical Exam:    Constitutional: Alert and oriented X3, patient is talkative with good eye contact.Skin: Warm, dry, good turgor, no rashes in visible areas, no psoriatic lesions, no petechial lesions, no malar rashes  .Eye: Equal, round and reactive, conjunctiva clear, lids normal EOM intactENMT: Lips without lesions, good dentition, no oropharyngeal ulcers, moist buccal mucosa, pinna without deformityNeck: Trachea midline, no masses, no thyromegaly.Lymph:  No cervical lymphadenopathy, no axillary lymphadenopathy, no supraclavicular lymphadenopathyRespiratory: Unlabored respiratory effort, lungs clear to auscultation, no wheezes, no ronchi.Cardiovascular: Normal S1, S2, no murmur, no edema.Abdomen: Soft, non-tender, no masses, no hepatosplenomegaly.Psych: Alert and oriented x3, normal affect and mood.Neuro: Cranial nerves 2-12 are grossly intact, no loss of sensation LEExt:no joint laxity noted in bilateral arms, no joint laxity noted in bilateral legs, no christin swan-neck or boutonniere deformities, no ulnar deviation no flexion contractures there is some mild synovitis in the right  fourth PIP joint, and there is some ulnar styloid prominence bilateral wrists but no effusions in the wrists elbows without flexion contractures no nodules no tophi, no Achilles inflammation patient has Ana's node on the right fourth finger bunions bilateral great toe MTP joints    Lab Results   Component Value Date/Time    QNTTBGOLD Negative 06/29/2016 02:03 PM     Lab Results   Component Value Date/Time    HEPBCORIGM Negative 06/29/2016 02:03 PM    HEPBSAG Negative 06/29/2016 02:03 PM     Lab Results   Component Value Date/Time    SODIUM 138 07/07/2018 11:25 AM    POTASSIUM 4.4 07/07/2018 11:25 AM    CHLORIDE 104 07/07/2018 11:25 AM    CO2 28 07/07/2018 11:25 AM    GLUCOSE 100 (H) 07/07/2018 11:25 AM    BUN 21 07/07/2018 11:25 AM    CREATININE 0.75 07/07/2018 11:25 AM      Lab Results   Component Value Date/Time    WBC 5.9 07/07/2018 11:25 AM    RBC 4.57 07/07/2018 11:25 AM    HEMOGLOBIN 14.4 07/07/2018 11:25 AM    HEMATOCRIT 44.5 07/07/2018 11:25 AM    MCV 97.4 07/07/2018 11:25 AM    MCH 31.5 07/07/2018 11:25 AM    MCHC 32.4 (L) 07/07/2018 11:25 AM    MPV 11.4 07/07/2018 11:25 AM    NEUTSPOLYS 67.70 07/07/2018 11:25 AM    LYMPHOCYTES 22.10 07/07/2018 11:25 AM    MONOCYTES 8.20 07/07/2018 11:25 AM    EOSINOPHILS 1.00 07/07/2018 11:25 AM    BASOPHILS 0.70 07/07/2018 11:25 AM      Lab Results   Component Value Date/Time    CALCIUM 8.7 07/07/2018 11:25 AM    ASTSGOT 25 07/07/2018 11:25 AM    ALTSGPT 23 07/07/2018 11:25 AM    ALKPHOSPHAT 41 07/07/2018 11:25 AM    TBILIRUBIN 0.8 07/07/2018 11:25 AM    ALBUMIN 3.9 07/07/2018 11:25 AM    TOTPROTEIN 6.8 07/07/2018 11:25 AM     Lab Results   Component Value Date/Time    URICACID 4.5 06/29/2016 02:03 PM    RHEUMFACTN <10 06/29/2016 02:03 PM    CCPANTIBODY 4 06/29/2016 02:03 PM     Lab Results   Component Value Date/Time    SEDRATEWES 11 07/07/2018 11:25 AM     Lab Results   Component Value Date/Time    HBA1C 5.5 05/16/2016 07:50 AM     Lab Results   Component Value  Date/Time    CPKTOTAL 61 06/29/2016 02:03 PM     Results for orders placed during the hospital encounter of 03/18/16   DX-JOINT SURVEY-HANDS SINGLE VIEW    Impression Multiple bilateral sites of osteoarthritis     Results for orders placed during the hospital encounter of 03/18/16   DX-JOINT SURVEY-FEET SINGLE VIEW    Impression 1.  Bilateral midfoot and forefoot osteoarthritis    2.  Bilateral bunion    3.  Prior fusion across the right 2nd proximal interphalangeal joint    4.  Foreign body or opaque material between 1st and 2nd phalanges     DS-BONE DENSITY STUDY (DEXA)    Impression According to the World Health Organization classification, bone mineral density of this patient is osteopenia with increased risk of fracture. Percentage decrease in bone mineral density in the lumbar region is statistically significant.        10-year Probability of Fracture:  Major Osteoporotic     19.3%  Hip     6.1%  Population      [place] ()    Based on left femur neck BMD          INTERPRETING LOCATION:  [address] [place] [address], [id number]     Results for orders placed during the hospital encounter of 01/14/09   DX-KNEE COMPLETE 4+    Impression IMPRESSION:     MILD PATELLOFEMORAL AND MEDIAL FEMOROTIBIAL COMPARTMENT DEGENERATIVE   OSTEOARTHROSIS.        [facility]     Read By ALEX WISE MD on Jan 14 2009 10:01AM  : DANA Transcription Date: Jovi 15 2009  8:11AM  THIS DOCUMENT HAS BEEN ELECTRONICALLY SIGNED BY: ALEX WISE MD on   Jan 16 2009  1:32PM        Results for orders placed during the hospital encounter of 01/14/09   DX-SHOULDER 2+    Impression IMPRESSION:     NORMAL RADIOGRAPHS OF THE LEFT SHOULDER WITH NOTE MADE OF MINIMAL   DEGENERATIVE OSTEOARTHROSIS OF THE GLENOHUMERAL JOINT WITH MINIMAL   SPURRING.           Results for orders placed during the hospital encounter of 08/18/18   MR-LUMBAR SPINE-W/O    Impression Mild interval worsening L3/4 right paracentral protrusion results in mild  worsening lateral recess stenosis    Otherwise stable multilevel degenerative change resulting in foraminal predominate stenoses, greatest is moderate to severe on the right at L3/4.    Lesser stenoses at other levels as detailed above    Stable L4/5 grade 1 anterolisthesis of secondary to severe facet arthropathy. Stable minimal degenerative retrolisthesis of the upper lumbar levels    Mature L5/S1 interbody fusion     Results for orders placed during the hospital encounter of 01/14/09   DX-CERVICAL SPINE-2 OR 3 VIEWS    Impression IMPRESSION:     DEGENERATIVE DISC AND FACET ARTHROPATHY C5-6, C6-7, AND DEGENERATIVE   FACET ARTHROPATHY AT C7-T1.           Assessment and Plan:     1. Rheumatoid arthritis involving multiple sites with positive rheumatoid factor (HCC)  Working diagnosis of rheumatoid arthritis patient on anticoagulation because of history of DVT and protein S deficiency  Currently on Xeljanz 5 mg p.o. twice daily without recurrence of melanoma does not seem to be managing patient's arthritic issues we will add Medrol 4 mg p.o. daily to see if that can help risks and benefits reviewed with patient.  - methylPREDNISolone (MEDROL) 4 MG Tab; 1 tab po qAM  Dispense: 90 Tab; Refill: 0  - tizanidine (ZANAFLEX) 4 MG Tab; Take 1 Tab by mouth every 6 hours as needed.  Dispense: 90 Tab; Refill: 0    2. Long-term current use of tofacitinib  Xeljanz 5 mg p.o. twice daily, patient up-to-date on screening labs, patient will need monitoring labs every 6 months next monitoring labs due by February 2019  - methylPREDNISolone (MEDROL) 4 MG Tab; 1 tab po qAM  Dispense: 90 Tab; Refill: 0  - tizanidine (ZANAFLEX) 4 MG Tab; Take 1 Tab by mouth every 6 hours as needed.  Dispense: 90 Tab; Refill: 0    3. Senile osteoporosis  Last DEXA March 2018, next DEXA March 2020  Patient currently on Fosamax 70 mg p.o. Weekly   continue calcium 1200 mg by mouth daily and vitamin D about 1000 units by mouth daily and magnesium 400 mg by  mouth daily  - methylPREDNISolone (MEDROL) 4 MG Tab; 1 tab po qAM  Dispense: 90 Tab; Refill: 0  - tizanidine (ZANAFLEX) 4 MG Tab; Take 1 Tab by mouth every 6 hours as needed.  Dispense: 90 Tab; Refill: 0    4. Spasm   tizanidine 4 mg p.o. nightly this will help with sleeping as well, spasms most likely secondary to low back issues  - methylPREDNISolone (MEDROL) 4 MG Tab; 1 tab po qAM  Dispense: 90 Tab; Refill: 0  - tizanidine (ZANAFLEX) 4 MG Tab; Take 1 Tab by mouth every 6 hours as needed.  Dispense: 90 Tab; Refill: 0    5. Protein S deficiency (HCC)  On chronic anticoagulation  This will impact with kind of medications we can use for this patient's arthritis, NSAIDs are contraindicated because of increased risk of bleeding while on chronic anticoagulation  - methylPREDNISolone (MEDROL) 4 MG Tab; 1 tab po qAM  Dispense: 90 Tab; Refill: 0    6. Essential hypertension, benign  May impact the type of medications we can use for this patient's arthritis. We will have to keep this under advisement.    7. Malignant melanoma of neck (HCC)  Monitored by dermatology on a regular basis, last melanoma almost 2 years ago.  20-minute discussion today with patient regarding Xeljanz and risk of developing melanoma and skin cancers, patient states the benefit outweigh the risk and prefers to stay on the Xeljanz 5 mg p.o. twice daily    8. Impaired fasting glucose  High blood sugar can exacerbate inflammatory state of patient's arthritis, discussed with patient the need to monitor and control blood sugars, patient states understanding    Followup: Return in about 3 months (around 12/6/2018). or sooner jose eduardo [place] Kareen was seen 30 minutes face-to-face of which more than 50% of the time was spent counseling the patient (excluding time for procedures)  regarding  rheumatological condition and care. Therapy was discussed in detail.    Please note that this dictation was created using voice recognition software. I have made  every reasonable attempt to correct obvious errors, but I expect that there are errors of grammar and possibly content that I did not discover before finalizing the note.

## 2018-09-07 ENCOUNTER — HOSPITAL ENCOUNTER (OUTPATIENT)
Dept: PAIN MANAGEMENT | Facility: REHABILITATION | Age: 83
End: 2018-09-07
Attending: PHYSICAL MEDICINE & REHABILITATION
Payer: MEDICARE

## 2018-09-07 ENCOUNTER — HOSPITAL ENCOUNTER (OUTPATIENT)
Dept: RADIOLOGY | Facility: REHABILITATION | Age: 83
End: 2018-09-07
Attending: PHYSICAL MEDICINE & REHABILITATION
Payer: MEDICARE

## 2018-09-07 VITALS
HEART RATE: 63 BPM | SYSTOLIC BLOOD PRESSURE: 178 MMHG | OXYGEN SATURATION: 97 % | BODY MASS INDEX: 30.43 KG/M2 | WEIGHT: 171.74 LBS | DIASTOLIC BLOOD PRESSURE: 77 MMHG | RESPIRATION RATE: 16 BRPM | TEMPERATURE: 97.5 F | HEIGHT: 63 IN

## 2018-09-07 PROCEDURE — 700117 HCHG RX CONTRAST REV CODE 255

## 2018-09-07 PROCEDURE — 700111 HCHG RX REV CODE 636 W/ 250 OVERRIDE (IP)

## 2018-09-07 PROCEDURE — 62323 NJX INTERLAMINAR LMBR/SAC: CPT

## 2018-09-07 RX ORDER — LIDOCAINE HYDROCHLORIDE 10 MG/ML
INJECTION, SOLUTION EPIDURAL; INFILTRATION; INTRACAUDAL; PERINEURAL
Status: COMPLETED
Start: 2018-09-07 | End: 2018-09-07

## 2018-09-07 RX ORDER — METHYLPREDNISOLONE ACETATE 80 MG/ML
INJECTION, SUSPENSION INTRA-ARTICULAR; INTRALESIONAL; INTRAMUSCULAR; SOFT TISSUE
Status: COMPLETED
Start: 2018-09-07 | End: 2018-09-07

## 2018-09-07 RX ADMIN — LIDOCAINE HYDROCHLORIDE 5 ML: 10 INJECTION, SOLUTION EPIDURAL; INFILTRATION; INTRACAUDAL; PERINEURAL at 09:45

## 2018-09-07 RX ADMIN — IOHEXOL 3 ML: 240 INJECTION, SOLUTION INTRATHECAL; INTRAVASCULAR; INTRAVENOUS; ORAL at 09:45

## 2018-09-07 RX ADMIN — METHYLPREDNISOLONE ACETATE 80 MG: 80 INJECTION, SUSPENSION INTRA-ARTICULAR; INTRALESIONAL; INTRAMUSCULAR; SOFT TISSUE at 09:45

## 2018-09-07 ASSESSMENT — PAIN SCALES - GENERAL
PAINLEVEL_OUTOF10: 0
PAINLEVEL_OUTOF10: 0
PAINLEVEL_OUTOF10: 3

## 2018-09-07 NOTE — PROGRESS NOTES
Med reconciliation completed. Pt has . Patient denies  antibiotics, &/or non steroidal anti inflammatories. Last dose Xarelto 3 days ago. Pt reports possible infection right lower jaw- root canal scheduled. Denies fever & was not prescribed antibiotics from dentist.  Discharge instructions reviewed & copy given to patient.

## 2018-09-07 NOTE — PROCEDURES
DATE OF SERVICE:  09/07/2018    NAME OF PROCEDURE:  Left L4-L5 interlaminar epidural steroid injection with 80   mg of Depo-Medrol under fluoroscopic guidance.    INDICATION:  The patient is a patient of mine with low back pain with pain and   paresthesias and weakness in the left lower extremity, felt to be due to her   known spondylosis and stenosis causing radiculitis/radiculopathy.  For this   reason, an epidural steroid injection was chosen for today's procedure.    DESCRIPTION OF PROCEDURE:  After appropriate informed consent was obtained,   the patient was placed prone on the table.  His skin was thoroughly cleansed   with Betadine swabs x3, then a subcutaneous intramuscular, interligamentous   region was anesthetized with lidocaine.  A 3.5-inch Tuohy was directed under   intermittent fluoroscopic guidance to lamina.  Once the lamina was detected,   the catheter was directed cephalad immediately and loss of resistance   technique was used to determine the epidural space.    EPIDUROGRAM:  A small amount of Omnipaque was placed at the left of midline at   L4-L5.  This was viewed on AP and lateral projections.  It showed a good   cephalad and caudad flow and the flow was unrestricted.  Depo-Medrol 80 mg   along with 0.5 mL of 1% lidocaine, an additional 1 mL of bacteriostatic normal   saline for a total of 2.5 mL was placed.  She tolerated the procedure well   without procedure complications.  She will be referred to the recovery and   followup in the office in the next week to monitor the response to injection.       ____________________________________     ALFONSO MARTINEZ MD    AT / [facility]    DD:  09/07/2018 10:17:54  DT:  09/07/2018 10:31:01    D#:  [id number]  Job#:  [id number]

## 2018-09-10 NOTE — PROGRESS NOTES
Outcome: no answer    Please transfer to Patient Outreach Team at [phone] when patient returns call.      Attempt # 2

## 2018-09-13 NOTE — PROGRESS NOTES
Outcome: no answer    Please transfer to Patient Outreach Team at [phone] when patient returns call.      Attempt # 3

## 2018-09-17 ENCOUNTER — APPOINTMENT (RX ONLY)
Dept: URBAN - METROPOLITAN AREA CLINIC 36 | Facility: CLINIC | Age: 83
Setting detail: DERMATOLOGY
End: 2018-09-17

## 2018-09-17 PROBLEM — C44.41 BASAL CELL CARCINOMA OF SKIN OF SCALP AND NECK: Status: ACTIVE | Noted: 2018-09-17

## 2018-09-17 PROCEDURE — 17311 MOHS 1 STAGE H/N/HF/G: CPT

## 2018-09-17 PROCEDURE — 13121 CMPLX RPR S/A/L 2.6-7.5 CM: CPT

## 2018-09-17 PROCEDURE — ? MOHS SURGERY

## 2018-09-17 NOTE — PROCEDURE: MOHS SURGERY
Detail Level: Detailed
Repair Anesthesia Type: 1% lidocaine with 1:100,000 epinephrine and 408mcg clindamycin/ml and a 1:10 solution of 8.4% sodium bicarbonate
Bilobed Transposition Flap Text: The defect edges were debeveled with a #15 scalpel blade.  Given the location of the defect and the proximity to free margins a bilobed transposition flap was deemed most appropriate.  Using a sterile surgical marker, an appropriate bilobe flap drawn around the defect.    The area thus outlined was incised deep to adipose tissue with a #15 scalpel blade.  The skin margins were undermined to an appropriate distance in all directions utilizing iris scissors.
Quadrants Reporting?: 0
Deep Sutures: 5-0 Vicryl
Cartilage Fenestration Performed For Additional Graft?: No
Xenograft Text: The defect edges were debeveled with a #15 scalpel blade.  Given the location of the defect, shape of the defect and the proximity to free margins a xenograft was deemed most appropriate.  The graft was then trimmed to fit the size of the defect.  The graft was then placed in the primary defect and oriented appropriately.
X Size Of Lesion In Cm (Optional): 1.1
Oculoplastic Surgeon Procedure Text (E): After obtaining clear surgical margins the patient was sent to oculoplastics for surgical repair.  The patient understands they will receive post-surgical care and follow-up from the referring physician's office.
Hatchet Flap Text: The defect edges were debeveled with a #15 scalpel blade.  Given the location of the defect, shape of the defect and the proximity to free margins a hatchet flap based from the glabella was deemed most appropriate.  Using a sterile surgical marker, an appropriate glabellar hatchet flap was drawn incorporating the defect and placing the expected incisions within the relaxed skin tension lines where possible.    The area thus outlined was incised deep to adipose tissue with a #15 scalpel blade.  The skin margins were undermined to an appropriate distance in all directions utilizing iris scissors.
Repair Performed By Another Provider Text (Leave Blank If You Do Not Want): After obtaining clear surgical margins the defect was repaired by another provider.
Referred To Otolaryngology For Closure Text (Leave Blank If You Do Not Want): After obtaining clear surgical margins the patient was sent to otolaryngology for surgical repair.  The patient understands they will receive post-surgical care and follow-up from the referring physician's office.
Island Pedicle Flap With Canthal Suspension Text: The defect edges were debeveled with a #15 scalpel blade.  Given the location of the defect, shape of the defect and the proximity to free margins an island pedicle advancement flap was deemed most appropriate.  Using a sterile surgical marker, an appropriate advancement flap was drawn incorporating the defect, outlining the appropriate donor tissue and placing the expected incisions within the relaxed skin tension lines where possible. The area thus outlined was incised deep to adipose tissue with a #15 scalpel blade.  The skin margins were undermined to an appropriate distance in all directions around the primary defect and laterally outward around the island pedicle utilizing iris scissors.  There was minimal undermining beneath the pedicle flap. A suspension suture was placed in the canthal tendon to prevent tension and prevent ectropion.
Previous Accession (Optional): [id number]
Unique Flap 2 Name: Peng Flap
Mohs Histo Method Verbiage: Each section was then chromacoded and processed in the Mohs lab using the Mohs protocol and submitted for frozen section.
Anesthesia Type: 0.5% lidocaine with 1:200,000 epinephrine and a 1:10 solution of 8.4% sodium bicarbonate and 408mcg clindamycin/ml
Interpolation Flap Text: A decision was made to reconstruct the defect utilizing an interpolation axial flap and a staged reconstruction.  A telfa template was made of the defect.  This telfa template was then used to outline the interpolation flap.  The donor area for the pedicle flap was then injected with anesthesia.  The flap was excised through the skin and subcutaneous tissue down to the layer of the underlying musculature.  The interpolation flap was carefully excised within this deep plane to maintain its blood supply.  The edges of the donor site were undermined.   The donor site was closed in a primary fashion.  The pedicle was then rotated into position and sutured.  Once the tube was sutured into place, adequate blood supply was confirmed with blanching and refill.  The pedicle was then wrapped with xeroform gauze and dressed appropriately with a telfa and gauze bandage to ensure continued blood supply and protect the attached pedicle.
Stage 11: Additional Anesthesia Type: 1% lidocaine with epinephrine
A-T Advancement Flap Text: The defect edges were debeveled with a #15 scalpel blade.  Given the location of the defect, shape of the defect and the proximity to free margins an A-T advancement flap was deemed most appropriate.  Using a sterile surgical marker, an appropriate advancement flap was drawn incorporating the defect and placing the expected incisions within the relaxed skin tension lines where possible.    The area thus outlined was incised deep to adipose tissue with a #15 scalpel blade.  The skin margins were undermined to an appropriate distance in all directions utilizing iris scissors.
Mid-Level Procedure Text (F): After obtaining clear surgical margins the patient was sent to a mid-level provider for surgical repair.  The patient understands they will receive post-surgical care and follow-up from the mid-level provider.
Complex Repair Preamble Text (Leave Blank If You Do Not Want): Extensive wide undermining was performed at least 2 cm in all directions.
Non-Graft Cartilage Fenestration Text: The cartilage was fenestrated with a 2mm punch biopsy to help facilitate healing.
Bcc Infiltrative Histology Text: There were numerous aggregates of basaloid cells demonstrating an infiltrative pattern.
Show Special Stain Variables In The Stage Tabs: Yes
Mercedes Flap Text: The defect edges were debeveled with a #15 scalpel blade.  Given the location of the defect, shape of the defect and the proximity to free margins a Mercedes flap was deemed most appropriate.  Using a sterile surgical marker, an appropriate advancement flap was drawn incorporating the defect and placing the expected incisions within the relaxed skin tension lines where possible. The area thus outlined was incised deep to adipose tissue with a #15 scalpel blade.  The skin margins were undermined to an appropriate distance in all directions utilizing iris scissors.
Bi-Rhombic Flap Text: The defect edges were debeveled with a #15 scalpel blade.  Given the location of the defect and the proximity to free margins a bi-rhombic flap was deemed most appropriate.  Using a sterile surgical marker, an appropriate rhombic flap was drawn incorporating the defect. The area thus outlined was incised deep to adipose tissue with a #15 scalpel blade.  The skin margins were undermined to an appropriate distance in all directions utilizing iris scissors.
Asc Procedure Text (C): After obtaining clear surgical margins the patient was sent to an ASC for surgical repair.  The patient understands they will receive post-surgical care and follow-up from the ASC physician.
O-L Flap Text: The defect edges were debeveled with a #15 scalpel blade.  Given the location of the defect, shape of the defect and the proximity to free margins an O-L flap was deemed most appropriate.  Using a sterile surgical marker, an appropriate advancement flap was drawn incorporating the defect and placing the expected incisions within the relaxed skin tension lines where possible.    The area thus outlined was incised deep to adipose tissue with a #15 scalpel blade.  The skin margins were undermined to an appropriate distance in all directions utilizing iris scissors.
Graft Basting Suture (Optional): 5-0 Fast Absorbing Gut
Estimated Blood Loss (Cc): less than 5 cc
Surgeon/Pathologist Verbiage (Will Incorporate Name Of Surgeon From Intro If Not Blank): operated in two distinct and integrated capacities as the surgeon and pathologist.
Unique Flap 4 Text: The defect edges were debeveled with a #15 scalpel blade.  Given the location of the defect and the proximity to free margins a Banner transposition flap was deemed most appropriate.  Using a sterile surgical marker, an appropriate Banner transposition flap was drawn incorporating the defect.    The area thus outlined was incised deep to adipose tissue with a #15 scalpel blade.  The skin margins were undermined to an appropriate distance in all directions utilizing iris scissors.
Mohs Rapid Report Verbiage: The area of clinically evident tumor was marked with skin marking ink and appropriately hatched.  The initial incision was made following the Mohs approach through the skin.  The specimen was taken to the lab, divided into the necessary number of pieces, chromacoded and processed according to the Mohs protocol.  This was repeated in successive stages until a tumor free defect was achieved.
Stage 1: Number Of Blocks?: 1
Localized Dermabrasion With Wire Brush Text: The patient was draped in routine manner.  Localized dermabrasion using 3 x 17 mm wire brush was performed in routine manner to papillary dermis. This spot dermabrasion is being performed to complete skin cancer reconstruction. It also will eliminate the other sun damaged precancerous cells that are known to be part of the regional effect of a lifetime's worth of sun exposure. This localized dermabrasion is therapeutic and should not be considered cosmetic in any regard.
Cartilage Graft Text: The defect edges were debeveled with a #15 scalpel blade.  Given the location of the defect, shape of the defect, the fact the defect involved a full thickness cartilage defect a cartilage graft was deemed most appropriate.  An appropriate donor site was identified, cleansed, and anesthetized. The cartilage graft was then harvested and transferred to the recipient site, oriented appropriately and then sutured into place.  The secondary defect was then repaired using a primary closure.
Area M Indication Text: Tumors in this location are included in Area M (cheek, forehead, scalp, neck, jawline and pretibial skin).  Mohs surgery is indicated for tumors in these anatomic locations.
Island Pedicle Flap-Requiring Vessel Identification Text: The defect edges were debeveled with a #15 scalpel blade.  Given the location of the defect, shape of the defect and the proximity to free margins an island pedicle advancement flap was deemed most appropriate.  Using a sterile surgical marker, an appropriate advancement flap was drawn, based on the axial vessel mentioned above, incorporating the defect, outlining the appropriate donor tissue and placing the expected incisions within the relaxed skin tension lines where possible.    The area thus outlined was incised deep to adipose tissue with a #15 scalpel blade.  The skin margins were undermined to an appropriate distance in all directions around the primary defect and laterally outward around the island pedicle utilizing iris scissors.  There was minimal undermining beneath the pedicle flap.
Location Indication Override (Is Already Calculated Based On Selected Body Location): Area M
Suture Removal: 7 days
Same Histology In Subsequent Stages Text: The pattern and morphology of the tumor is as described in the first stage.
Consent 2/Introductory Paragraph: Mohs surgery was explained to the patient and consent was obtained. The risks, benefits and alternatives to therapy were discussed in detail. Specifically, the risks of infection, scarring, bleeding, prolonged wound healing, incomplete removal, allergy to anesthesia, nerve injury and recurrence were addressed. Prior to the procedure, the treatment site was clearly identified and confirmed by the patient. All components of Universal Protocol/PAUSE Rule completed.
Advancement Flap (Single) Text: The defect edges were debeveled with a #15 scalpel blade.  Given the location of the defect and the proximity to free margins a single advancement flap was deemed most appropriate.  Using a sterile surgical marker, an appropriate advancement flap was drawn incorporating the defect and placing the expected incisions within the relaxed skin tension lines where possible.    The area thus outlined was incised deep to adipose tissue with a #15 scalpel blade.  The skin margins were undermined to an appropriate distance in all directions utilizing iris scissors.
Plastic Surgeon Procedure Text (C): After obtaining clear surgical margins the patient was sent to plastics for surgical repair.  The patient understands they will receive post-surgical care and follow-up from the referring physician's office.
Cheiloplasty (Complex) Text: A decision was made to reconstruct the defect with a  cheiloplasty.  The defect was undermined extensively.  Additional obicularis oris muscle was excised with a 15 blade scalpel.  The defect was converted into a full thickness wedge to facilite a better cosmetic result.  Small vessels were then tied off with 5-0 monocyrl. The obicularis oris, superficial fascia, adipose and dermis were then reapproximated.  After the deeper layers were approximated the epidermis was reapproximated with particular care given to realign the vermilion border.
Ear Star Wedge Flap Text: The defect edges were debeveled with a #15 blade scalpel.  Given the location of the defect and the proximity to free margins (helical rim) an ear star wedge flap was deemed most appropriate.  Using a sterile surgical marker, the appropriate flap was drawn incorporating the defect and placing the expected incisions between the helical rim and antihelix where possible.  The area thus outlined was incised through and through with a #15 scalpel blade.
Consent (Nose)/Introductory Paragraph: The rationale for Mohs was explained to the patient and consent was obtained. The risks, benefits and alternatives to therapy were discussed in detail. Specifically, the risks of nasal deformity, changes in the flow of air through the nose, infection, scarring, bleeding, prolonged wound healing, incomplete removal, allergy to anesthesia, nerve injury and recurrence were addressed. Prior to the procedure, the treatment site was clearly identified and confirmed by the patient. All components of Universal Protocol/PAUSE Rule completed.
Z Plasty Text: The lesion was extirpated to the level of the fat with a #15 scalpel blade.  Given the location of the defect, shape of the defect and the proximity to free margins a Z-plasty was deemed most appropriate for repair.  Using a sterile surgical marker, the appropriate transposition arms of the Z-plasty were drawn incorporating the defect and placing the expected incisions within the relaxed skin tension lines where possible.    The area thus outlined was incised deep to adipose tissue with a #15 scalpel blade.  The skin margins were undermined to an appropriate distance in all directions utilizing iris scissors.  The opposing transposition arms were then transposed into place in opposite direction and anchored with interrupted buried subcutaneous sutures.
Post-Care Instructions: I reviewed with the patient in detail post-care instructions. Patient is not to engage in any heavy lifting, exercise, or swimming for the next 14 days. Should the patient develop any fevers, chills, bleeding, severe pain patient will contact the office immediately.
Purse String (Simple) Text: Given the location of the defect and the characteristics of the surrounding skin a purse string closure was deemed most appropriate.  Undermining was performed circumfirentially around the surgical defect.  A purse string suture was then placed and tightened.
Keystone Flap Text: The defect edges were debeveled with a #15 scalpel blade.  Given the location of the defect, shape of the defect a keystone flap was deemed most appropriate.  Using a sterile surgical marker, an appropriate keystone flap was drawn incorporating the defect, outlining the appropriate donor tissue and placing the expected incisions within the relaxed skin tension lines where possible. The area thus outlined was incised deep to adipose tissue with a #15 scalpel blade.  The skin margins were undermined to an appropriate distance in all directions around the primary defect and laterally outward around the flap utilizing iris scissors.
Consent Type: Consent 1 (Standard)
Rhombic Flap Text: The defect edges were debeveled with a #15 scalpel blade.  Given the location of the defect and the proximity to free margins a rhombic flap was deemed most appropriate.  Using a sterile surgical marker, an appropriate rhombic flap was drawn incorporating the defect.    The area thus outlined was incised deep to adipose tissue with a #15 scalpel blade.  The skin margins were undermined to an appropriate distance in all directions utilizing iris scissors.
Melolabial Transposition Flap Text: The defect edges were debeveled with a #15 scalpel blade.  Given the location of the defect and the proximity to free margins a melolabial flap was deemed most appropriate.  Using a sterile surgical marker, an appropriate melolabial transposition flap was drawn incorporating the defect.    The area thus outlined was incised deep to adipose tissue with a #15 scalpel blade.  The skin margins were undermined to an appropriate distance in all directions utilizing iris scissors.
O-T Advancement Flap Text: The defect edges were debeveled with a #15 scalpel blade.  Given the location of the defect, shape of the defect and the proximity to free margins an O-T advancement flap was deemed most appropriate.  Using a sterile surgical marker, an appropriate advancement flap was drawn incorporating the defect and placing the expected incisions within the relaxed skin tension lines where possible.    The area thus outlined was incised deep to adipose tissue with a #15 scalpel blade.  The skin margins were undermined to an appropriate distance in all directions utilizing iris scissors.
Split-Thickness Skin Graft Text: The defect edges were debeveled with a #15 scalpel blade.  Given the location of the defect, shape of the defect and the proximity to free margins a split thickness skin graft was deemed most appropriate.  Using a sterile surgical marker, the primary defect shape was transferred to the donor site. The split thickness graft was then harvested.  The skin graft was then placed in the primary defect and oriented appropriately.
Epidermal Closure Graft Donor Site (Optional): simple interrupted
Bcc Histology Text: There were numerous aggregates of basaloid cells.
Banner Transposition Flap Text: The defect edges were debeveled with a #15 scalpel blade.  Given the location of the defect and the proximity to free margins a Banner transposition flap was deemed most appropriate.  Using a sterile surgical marker, an appropriate flap drawn around the defect. The area thus outlined was incised deep to adipose tissue with a #15 scalpel blade.  The skin margins were undermined to an appropriate distance in all directions utilizing iris scissors.
Consent (Scalp)/Introductory Paragraph: The rationale for Mohs was explained to the patient and consent was obtained. The risks, benefits and alternatives to therapy were discussed in detail. Specifically, the risks of changes in hair growth pattern secondary to repair, infection, scarring, bleeding, prolonged wound healing, incomplete removal, allergy to anesthesia, nerve injury and recurrence were addressed. Prior to the procedure, the treatment site was clearly identified and confirmed by the patient. All components of Universal Protocol/PAUSE Rule completed.
Graft Cartilage Fenestration Text: The cartilage was fenestrated with a 2mm punch biopsy to help facilitate graft survival and healing.
Island Pedicle Flap Text: The defect edges were debeveled with a #15 scalpel blade.  Given the location of the defect, shape of the defect and the proximity to free margins an island pedicle advancement flap was deemed most appropriate.  Using a sterile surgical marker, an appropriate advancement flap was drawn incorporating the defect, outlining the appropriate donor tissue and placing the expected incisions within the relaxed skin tension lines where possible.    The area thus outlined was incised deep to adipose tissue with a #15 scalpel blade.  The skin margins were undermined to an appropriate distance in all directions around the primary defect and laterally outward around the island pedicle utilizing iris scissors.  There was minimal undermining beneath the pedicle flap.
Simple / Intermediate / Complex Repair - Final Wound Length In Cm: 3.2
Unique Flap 3 Name: Mercedes Flap
Crescentic Intermediate Repair Preamble Text (Leave Blank If You Do Not Want): Undermining was performed with blunt dissection.
Consent (Spinal Accessory)/Introductory Paragraph: The rationale for Mohs was explained to the patient and consent was obtained. The risks, benefits and alternatives to therapy were discussed in detail. Specifically, the risks of damage to the spinal accessory nerve, infection, scarring, bleeding, prolonged wound healing, incomplete removal, allergy to anesthesia, and recurrence were addressed. Prior to the procedure, the treatment site was clearly identified and confirmed by the patient. All components of Universal Protocol/PAUSE Rule completed.
Area L Indication Text: Tumors in this location are included in Area L (trunk and extremities).  Mohs surgery is indicated for larger tumors, or tumors with aggressive histologic features, in these anatomic locations.
Posterior Auricular Interpolation Flap Text: A decision was made to reconstruct the defect utilizing an interpolation axial flap and a staged reconstruction.  A telfa template was made of the defect.  This telfa template was then used to outline the posterior auricular interpolation flap.  The donor area for the pedicle flap was then injected with anesthesia.  The flap was excised through the skin and subcutaneous tissue down to the layer of the underlying musculature.  The pedicle flap was carefully excised within this deep plane to maintain its blood supply.  The edges of the donor site were undermined.   The donor site was closed in a primary fashion.  The pedicle was then rotated into position and sutured.  Once the tube was sutured into place, adequate blood supply was confirmed with blanching and refill.  The pedicle was then wrapped with xeroform gauze and dressed appropriately with a telfa and gauze bandage to ensure continued blood supply and protect the attached pedicle.
Advancement Flap (Double) Text: The defect edges were debeveled with a #15 scalpel blade.  Given the location of the defect and the proximity to free margins a double advancement flap was deemed most appropriate.  Using a sterile surgical marker, the appropriate advancement flaps were drawn incorporating the defect and placing the expected incisions within the relaxed skin tension lines where possible.    The area thus outlined was incised deep to adipose tissue with a #15 scalpel blade.  The skin margins were undermined to an appropriate distance in all directions utilizing iris scissors.
Consent (Marginal Mandibular)/Introductory Paragraph: The rationale for Mohs was explained to the patient and consent was obtained. The risks, benefits and alternatives to therapy were discussed in detail. Specifically, the risks of damage to the marginal mandibular branch of the facial nerve, infection, scarring, bleeding, prolonged wound healing, incomplete removal, allergy to anesthesia, and recurrence were addressed. Prior to the procedure, the treatment site was clearly identified and confirmed by the patient. All components of Universal Protocol/PAUSE Rule completed.
Dermal Autograft Text: The defect edges were debeveled with a #15 scalpel blade.  Given the location of the defect, shape of the defect and the proximity to free margins a dermal autograft was deemed most appropriate.  Using a sterile surgical marker, the primary defect shape was transferred to the donor site. The area thus outlined was incised deep to adipose tissue with a #15 scalpel blade.  The harvested graft was then trimmed of adipose and epidermal tissue until only dermis was left.  The skin graft was then placed in the primary defect and oriented appropriately.
Partial Purse String (Intermediate) Text: Given the location of the defect and the characteristics of the surrounding skin an intermediate purse string closure was deemed most appropriate.  Undermining was performed circumfirentially around the surgical defect.  A purse string suture was then placed and tightened. Wound tension only allowed a partial closure of the circular defect.
Ear Wedge Repair Text: A wedge excision was completed by carrying down an excision through the full thickness of the ear and cartilage with an inward facing Burow's triangle. The wound was then closed in a layered fashion.
Consent 1/Introductory Paragraph: The rationale for Mohs was explained to the patient and consent was obtained. The risks, benefits and alternatives to therapy were discussed in detail. Specifically, the risks of infection, scarring, bleeding, prolonged wound healing, incomplete removal, allergy to anesthesia, nerve injury and recurrence were addressed. Prior to the procedure, the treatment site was clearly identified and confirmed by the patient. All components of Universal Protocol/PAUSE Rule completed.
No Residual Tumor Seen Histology Text: There were no malignant cells seen in the sections examined.
W Plasty Text: The lesion was extirpated to the level of the fat with a #15 scalpel blade.  Given the location of the defect, shape of the defect and the proximity to free margins a W-plasty was deemed most appropriate for repair.  Using a sterile surgical marker, the appropriate transposition arms of the W-plasty were drawn incorporating the defect and placing the expected incisions within the relaxed skin tension lines where possible.    The area thus outlined was incised deep to adipose tissue with a #15 scalpel blade.  The skin margins were undermined to an appropriate distance in all directions utilizing iris scissors.  The opposing transposition arms were then transposed into place in opposite direction and anchored with interrupted buried subcutaneous sutures.
Closure 2 Information: This tab is for additional flaps and grafts, including complex repair and grafts and complex repair and flaps. You can also specify a different location for the additional defect, if the location is the same you do not need to select a new one. We will insert the automated text for the repair you select below just as we do for solitary flaps and grafts. Please note that at this time if you select a location with a different insurance zone you will need to override the ICD10 and CPT if appropriate.
Melolabial Interpolation Flap Text: A decision was made to reconstruct the defect utilizing an interpolation axial flap and a staged reconstruction.  A telfa template was made of the defect.  This telfa template was then used to outline the melolabial interpolation flap.  The donor area for the pedicle flap was then injected with anesthesia.  The flap was excised through the skin and subcutaneous tissue down to the layer of the underlying musculature.  The pedicle flap was carefully excised within this deep plane to maintain its blood supply.  The edges of the donor site were undermined.   The donor site was closed in a primary fashion.  The pedicle was then rotated into position and sutured.  Once the tube was sutured into place, adequate blood supply was confirmed with blanching and refill.  The pedicle was then wrapped with xeroform gauze and dressed appropriately with a telfa and gauze bandage to ensure continued blood supply and protect the attached pedicle.
Mohs Method Verbiage: An incision at a 45 degree angle following the standard Mohs approach was done and the specimen was harvested as a microscopic controlled layer.
Unna Boot Text: An Unna boot was placed to help immobilize the limb and facilitate more rapid healing.
Anesthesia Volume In Cc: 2
Unique Flap 3 Text: The defect edges were debeveled with a #15 scalpel blade.  Given the location of the defect, shape of the defect and the proximity to free margins a Mercedes (double advancement flap) was deemed most appropriate.  Using a sterile surgical marker, the appropriate transposition flaps were drawn incorporating the defect and placing the expected incisions within the relaxed skin tension lines where possible.    The area thus outlined was incised deep to adipose tissue with a #15 scalpel blade.  The skin margins were undermined to an appropriate distance in all directions utilizing iris scissors.  Hemostasis was achieved with electrocautery.  The flaps were then advanced into the defect and anchored with interrupted buried subcutaneous sutures.
Referring Physician (Optional): A. Schoening
Complex Repair And Flap Additional Text (Will Appearing After The Standard Complex Repair Text): The complex repair was not sufficient to completely close the primary defect. The remaining additional defect was repaired with the flap mentioned below.
Unique Flap 2 Text: A decision was made to reconstruct the defect utilizing a Peng Flap (Bilateral Advancement Rotation Flap). Given the location of the defect and the proximity to free margins, this flap was deemed most appropriate.  Using a sterile surgical marker, the appropriate rotation flaps were drawn incorporating the defect and placing the expected incisions within the relaxed skin tension lines where possible.    The area thus outlined was incised deep to adipose tissue with a #15 scalpel blade.  The skin margins were undermined to an appropriate distance in all directions utilizing iris scissors.
Consent (Lip)/Introductory Paragraph: The rationale for Mohs was explained to the patient and consent was obtained. The risks, benefits and alternatives to therapy were discussed in detail. Specifically, the risks of lip deformity, changes in the oral aperture, infection, scarring, bleeding, prolonged wound healing, incomplete removal, allergy to anesthesia, nerve injury and recurrence were addressed. Prior to the procedure, the treatment site was clearly identified and confirmed by the patient. All components of Universal Protocol/PAUSE Rule completed.
Rotation Flap Text: The defect edges were debeveled with a #15 scalpel blade.  Given the location of the defect, shape of the defect and the proximity to free margins a rotation flap was deemed most appropriate.  Using a sterile surgical marker, an appropriate rotation flap was drawn incorporating the defect and placing the expected incisions within the relaxed skin tension lines where possible.    The area thus outlined was incised deep to adipose tissue with a #15 scalpel blade.  The skin margins were undermined to an appropriate distance in all directions utilizing iris scissors.
Eye Protection Verbiage: Before proceeding with the stage, a plastic scleral shield was inserted. The globe was anesthetized with a few drops of 1% lidocaine with 1:100,000 epinephrine. Then, an appropriate sized scleral shield was chosen and coated with lacrilube ointment. The shield was gently inserted and left in place for the duration of each stage. After the stage was completed, the shield was gently removed.
Epidermal Closure: running cuticular
Bilobed Flap Text: The defect edges were debeveled with a #15 scalpel blade.  Given the location of the defect and the proximity to free margins a bilobe flap was deemed most appropriate.  Using a sterile surgical marker, an appropriate bilobe flap drawn around the defect.    The area thus outlined was incised deep to adipose tissue with a #15 scalpel blade.  The skin margins were undermined to an appropriate distance in all directions utilizing iris scissors.
Wound Care (No Sutures): Petrolatum
Consent (Near Eyelid Margin)/Introductory Paragraph: The rationale for Mohs was explained to the patient and consent was obtained. The risks, benefits and alternatives to therapy were discussed in detail. Specifically, the risks of ectropion or eyelid deformity, infection, scarring, bleeding, prolonged wound healing, incomplete removal, allergy to anesthesia, nerve injury and recurrence were addressed. Prior to the procedure, the treatment site was clearly identified and confirmed by the patient. All components of Universal Protocol/PAUSE Rule completed.
Muscle Hinge Flap Text: The defect edges were debeveled with a #15 scalpel blade.  Given the size, depth and location of the defect and the proximity to free margins a muscle hinge flap was deemed most appropriate.  Using a sterile surgical marker, an appropriate hinge flap was drawn incorporating the defect. The area thus outlined was incised with a #15 scalpel blade.  The skin margins were undermined to an appropriate distance in all directions utilizing iris scissors.
Closure 3 Information: This tab is for additional flaps and grafts above and beyond our usual structured repairs.  Please note if you enter information here it will not currently bill and you will need to add the billing information manually.
O-T Plasty Text: The defect edges were debeveled with a #15 scalpel blade.  Given the location of the defect, shape of the defect and the proximity to free margins an O-T plasty was deemed most appropriate.  Using a sterile surgical marker, an appropriate O-T plasty was drawn incorporating the defect and placing the expected incisions within the relaxed skin tension lines where possible.    The area thus outlined was incised deep to adipose tissue with a #15 scalpel blade.  The skin margins were undermined to an appropriate distance in all directions utilizing iris scissors.
Cheek Interpolation Flap Text: A decision was made to reconstruct the defect utilizing an interpolation axial flap and a staged reconstruction.  A telfa template was made of the defect.  This telfa template was then used to outline the Cheek Interpolation flap.  The donor area for the pedicle flap was then injected with anesthesia.  The flap was excised through the skin and subcutaneous tissue down to the layer of the underlying musculature.  The interpolation flap was carefully excised within this deep plane to maintain its blood supply.  The edges of the donor site were undermined.   The donor site was closed in a primary fashion.  The pedicle was then rotated into position and sutured.  Once the tube was sutured into place, adequate blood supply was confirmed with blanching and refill.  The pedicle was then wrapped with xeroform gauze and dressed appropriately with a telfa and gauze bandage to ensure continued blood supply and protect the attached pedicle.
Ftsg Text: The defect edges were debeveled with a #15 scalpel blade.  Given the location of the defect, shape of the defect and the proximity to free margins a full thickness skin graft was deemed most appropriate.  Using a sterile surgical marker, the primary defect shape was transferred to the donor site. The area thus outlined was incised deep to adipose tissue with a #15 scalpel blade.  The harvested graft was then trimmed of adipose tissue until only dermis and epidermis was left.  The skin margins of the secondary defect were undermined to an appropriate distance in all directions utilizing iris scissors.  The secondary defect was closed with interrupted buried subcutaneous sutures.  The skin edges were then re-apposed with running  sutures.  The skin graft was then placed in the primary defect and oriented appropriately.
Unique Flap 4 Name: Banner Flap
Paramedian Forehead Flap Text: A decision was made to reconstruct the defect utilizing an interpolation axial flap and a staged reconstruction.  A telfa template was made of the defect.  This telfa template was then used to outline the paramedian forehead pedicle flap.  The donor area for the pedicle flap was then injected with anesthesia.  The flap was excised through the skin and subcutaneous tissue down to the layer of the underlying musculature.  The pedicle flap was carefully excised within this deep plane to maintain its blood supply.  The edges of the donor site were undermined.   The donor site was closed in a primary fashion.  The pedicle was then rotated into position and sutured.  Once the tube was sutured into place, adequate blood supply was confirmed with blanching and refill.  The pedicle was then wrapped with xeroform gauze and dressed appropriately with a telfa and gauze bandage to ensure continued blood supply and protect the attached pedicle.
Consent (Temporal Branch)/Introductory Paragraph: The rationale for Mohs was explained to the patient and consent was obtained. The risks, benefits and alternatives to therapy were discussed in detail. Specifically, the risks of damage to the temporal branch of the facial nerve, infection, scarring, bleeding, prolonged wound healing, incomplete removal, allergy to anesthesia, and recurrence were addressed. Prior to the procedure, the treatment site was clearly identified and confirmed by the patient. All components of Universal Protocol/PAUSE Rule completed.
Dressing (No Sutures): dry sterile dressing
Home Suture Removal Text: Patient was provided instructions on removing sutures and will remove their sutures at home.  If they have any questions or difficulties they will call the office.
Dorsal Nasal Flap Text: The defect edges were debeveled with a #15 scalpel blade.  Given the location of the defect and the proximity to free margins a dorsal nasal flap,based upon the glabellar folds, was deemed most appropriate.  Using a sterile surgical marker, an appropriate dorsal nasal flap was drawn around the defect.    The area thus outlined was incised deep to adipose tissue with a #15 scalpel blade.  The skin margins were undermined to an appropriate distance in all directions utilizing iris scissors.
Mohs Case Number: 
Repair Anesthesia Method: local infiltration
Surgeon Performing Repair (Optional): Melly
Anesthesia Volume In Cc: 6
Secondary Intention Text (Leave Blank If You Do Not Want): The defect will heal with secondary intention.
Unique Flap 1 Text: A decision was made to reconstruct the defect utilizing a myocutaneous Island pedicle Flap based on the levator labii superioris muscle.  A telfa template was made of the defect.  This telfa template was then used to outline the myocutaneous flap, based along the meilolabial fold.  The donor area for the pedicle flap was then injected with anesthesia.  The flap was excised through the skin and subcutaneous tissue down to the layer of the underlying musculature.  The myocutaneous flap was carefully excised within this deep plane to maintain its blood supply. Based on the muscle. The edges of the donor site were undermined.   The donor site was closed in a primary fashion to the point of transposition.  The pedicle was then transposed into position and sutured.  Once the flap was sutured into place, adequate blood supply was confirmed with blanching and refill.
Helical Rim Advancement Flap Text: The defect edges were debeveled with a #15 blade scalpel.  Given the location of the defect and the proximity to free margins (helical rim) a double helical rim advancement flap was deemed most appropriate.  Using a sterile surgical marker, the appropriate advancement flaps were drawn incorporating the defect and placing the expected incisions between the helical rim and antihelix where possible.  The area thus outlined was incised through and through with a #15 scalpel blade.  With a skin hook and iris scissors, the flaps were gently and sharply undermined and freed up.
Burow's Advancement Flap Text: The defect edges were debeveled with a #15 scalpel blade.  Given the location of the defect and the proximity to free margins a Burow's advancement flap was deemed most appropriate.  Using a sterile surgical marker, the appropriate advancement flap was drawn incorporating the defect and placing the expected incisions within the relaxed skin tension lines where possible.    The area thus outlined was incised deep to adipose tissue with a #15 scalpel blade.  The skin margins were undermined to an appropriate distance in all directions utilizing iris scissors.
Alternatives Discussed Intro (Do Not Add Period): I discussed alternative treatments to Mohs surgery and specifically discussed the risks and benefits of
Mastoid Interpolation Flap Text: A decision was made to reconstruct the defect utilizing an interpolation axial flap and a staged reconstruction.  A telfa template was made of the defect.  This telfa template was then used to outline the mastoid interpolation flap.  The donor area for the pedicle flap was then injected with anesthesia.  The flap was excised through the skin and subcutaneous tissue down to the layer of the underlying musculature.  The pedicle flap was carefully excised within this deep plane to maintain its blood supply.  The edges of the donor site were undermined.   The donor site was closed in a primary fashion.  The pedicle was then rotated into position and sutured.  Once the tube was sutured into place, adequate blood supply was confirmed with blanching and refill.  The pedicle was then wrapped with xeroform gauze and dressed appropriately with a telfa and gauze bandage to ensure continued blood supply and protect the attached pedicle.
H Plasty Text: Given the location of the defect, shape of the defect and the proximity to free margins a H-plasty was deemed most appropriate for repair.  Using a sterile surgical marker, the appropriate advancement arms of the H-plasty were drawn incorporating the defect and placing the expected incisions within the relaxed skin tension lines where possible. The area thus outlined was incised deep to adipose tissue with a #15 scalpel blade. The skin margins were undermined to an appropriate distance in all directions utilizing iris scissors.  The opposing advancement arms were then advanced into place in opposite direction and anchored with interrupted buried subcutaneous sutures.
Partial Purse String (Simple) Text: Given the location of the defect and the characteristics of the surrounding skin a simple purse string closure was deemed most appropriate.  Undermining was performed circumfirentially around the surgical defect.  A purse string suture was then placed and tightened. Wound tension only allowed a partial closure of the circular defect.
Advancement-Rotation Flap Text: The defect edges were debeveled with a #15 scalpel blade.  Given the location of the defect, shape of the defect and the proximity to free margins an advancement-rotation flap was deemed most appropriate.  Using a sterile surgical marker, an appropriate flap was drawn incorporating the defect and placing the expected incisions within the relaxed skin tension lines where possible. The area thus outlined was incised deep to adipose tissue with a #15 scalpel blade.  The skin margins were undermined to an appropriate distance in all directions utilizing iris scissors.
Spiral Flap Text: The defect edges were debeveled with a #15 scalpel blade.  Given the location of the defect, shape of the defect and the proximity to free margins a spiral flap was deemed most appropriate.  Using a sterile surgical marker, an appropriate rotation flap was drawn incorporating the defect and placing the expected incisions within the relaxed skin tension lines where possible. The area thus outlined was incised deep to adipose tissue with a #15 scalpel blade.  The skin margins were undermined to an appropriate distance in all directions utilizing iris scissors.
Donor Site Anesthesia Type: same as repair anesthesia
Full Thickness Lip Wedge Repair (Flap) Text: Given the location of the defect and the proximity to free margins a full thickness wedge repair was deemed most appropriate.  Using a sterile surgical marker, the appropriate repair was drawn incorporating the defect and placing the expected incisions perpendicular to the vermilion border.  The vermilion border was also meticulously outlined to ensure appropriate reapproximation during the repair.  The area thus outlined was incised through and through with a #15 scalpel blade.  The muscularis and dermis were reaproximated with deep sutures following hemostasis. Care was taken to realign the vermilion border before proceeding with the superficial closure.  Once the vermilion was realigned the superfical and mucosal closure was finished.
Wound Care: Aquaphor
Complex Repair And Graft Additional Text (Will Appearing After The Standard Complex Repair Text): The complex repair was not sufficient to completely close the primary defect. The remaining additional defect was repaired with the graft mentioned below.
Skin Substitute Text: The defect edges were debeveled with a #15 scalpel blade.  Given the location of the defect, shape of the defect and the proximity to free margins a skin substitute graft was deemed most appropriate.  The graft material was trimmed to fit the size of the defect. The graft was then placed in the primary defect and oriented appropriately.
Hemostasis: Electrocautery
Tissue Cultured Epidermal Autograft Text: The defect edges were debeveled with a #15 scalpel blade.  Given the location of the defect, shape of the defect and the proximity to free margins a tissue cultured epidermal autograft was deemed most appropriate.  The graft was then trimmed to fit the size of the defect.  The graft was then placed in the primary defect and oriented appropriately.
Cheiloplasty (Less Than 50%) Text: A decision was made to reconstruct the defect with a  cheiloplasty.  The defect was undermined extensively.  Additional obicularis oris muscle was excised with a 15 blade scalpel.  The defect was converted into a full thickness wedge, of less than 50% of the vertical height of the lip, to facilite a better cosmetic result.  Small vessels were then tied off with 5-0 monocyrl. The obicularis oris, superficial fascia, adipose and dermis were then reapproximated.  After the deeper layers were approximated the epidermis was reapproximated with particular care given to realign the vermilion border.
Postop Diagnosis: same
Consent 3/Introductory Paragraph: I gave the patient a chance to ask questions they had about the procedure.  Following this I explained the Mohs procedure and consent was obtained. The risks, benefits and alternatives to therapy were discussed in detail. Specifically, the risks of infection, scarring, bleeding, prolonged wound healing, incomplete removal, allergy to anesthesia, nerve injury and recurrence were addressed. Prior to the procedure, the treatment site was clearly identified and confirmed by the patient. All components of Universal Protocol/PAUSE Rule completed.
Cheek-To-Nose Interpolation Flap Text: A decision was made to reconstruct the defect utilizing an interpolation axial flap and a staged reconstruction.  A telfa template was made of the defect.  This telfa template was then used to outline the Cheek-To-Nose Interpolation flap.  The donor area for the pedicle flap was then injected with anesthesia.  The flap was excised through the skin and subcutaneous tissue down to the layer of the underlying musculature.  The interpolation flap was carefully excised within this deep plane to maintain its blood supply.  The edges of the donor site were undermined.   The donor site was closed in a primary fashion.  The pedicle was then rotated into position and sutured.  Once the tube was sutured into place, adequate blood supply was confirmed with blanching and refill.  The pedicle was then wrapped with xeroform gauze and dressed appropriately with a telfa and gauze bandage to ensure continued blood supply and protect the attached pedicle.
Epidermal Sutures: 5-0 Ethilon
Trilobed Flap Text: The defect edges were debeveled with a #15 scalpel blade.  Given the location of the defect and the proximity to free margins a trilobed flap was deemed most appropriate.  Using a sterile surgical marker, an appropriate trilobed flap drawn around the defect.    The area thus outlined was incised deep to adipose tissue with a #15 scalpel blade.  The skin margins were undermined to an appropriate distance in all directions utilizing iris scissors.
Epidermal Autograft Text: The defect edges were debeveled with a #15 scalpel blade.  Given the location of the defect, shape of the defect and the proximity to free margins an epidermal autograft was deemed most appropriate.  Using a sterile surgical marker, the primary defect shape was transferred to the donor site. The epidermal graft was then harvested.  The skin graft was then placed in the primary defect and oriented appropriately.
Alar Island Pedicle Flap Text: The defect edges were debeveled with a #15 scalpel blade.  Given the location of the defect, shape of the defect and the proximity to the alar rim an island pedicle advancement flap was deemed most appropriate.  Using a sterile surgical marker, an appropriate advancement flap was drawn incorporating the defect, outlining the appropriate donor tissue and placing the expected incisions within the nasal ala running parallel to the alar rim. The area thus outlined was incised with a #15 scalpel blade.  The skin margins were undermined minimally to an appropriate distance in all directions around the primary defect and laterally outward around the island pedicle utilizing iris scissors.  There was minimal undermining beneath the pedicle flap.
O-Z Plasty Text: The defect edges were debeveled with a #15 scalpel blade.  Given the location of the defect, shape of the defect and the proximity to free margins an O-Z plasty (double transposition flap) was deemed most appropriate.  Using a sterile surgical marker, the appropriate transposition flaps were drawn incorporating the defect and placing the expected incisions within the relaxed skin tension lines where possible.    The area thus outlined was incised deep to adipose tissue with a #15 scalpel blade.  The skin margins were undermined to an appropriate distance in all directions utilizing iris scissors.  Hemostasis was achieved with electrocautery.  The flaps were then transposed into place, one clockwise and the other counterclockwise, and anchored with interrupted buried subcutaneous sutures.
Repair Type: Complex Repair
V-Y Flap Text: The defect edges were debeveled with a #15 scalpel blade.  Given the location of the defect, shape of the defect and the proximity to free margins a V-Y flap was deemed most appropriate.  Using a sterile surgical marker, an appropriate advancement flap was drawn incorporating the defect and placing the expected incisions within the relaxed skin tension lines where possible.    The area thus outlined was incised deep to adipose tissue with a #15 scalpel blade.  The skin margins were undermined to an appropriate distance in all directions utilizing iris scissors.
Crescentic Advancement Flap Text: The defect edges were debeveled with a #15 scalpel blade.  Given the location of the defect and the proximity to free margins a crescentic advancement flap was deemed most appropriate.  Using a sterile surgical marker, the appropriate advancement flap was drawn incorporating the defect and placing the expected incisions within the relaxed skin tension lines where possible.    The area thus outlined was incised deep to adipose tissue with a #15 scalpel blade.  The skin margins were undermined to an appropriate distance in all directions utilizing iris scissors.
Mauc Instructions: By selecting yes to the question below the MAUC number will be added into the note.  This will be calculated automatically based on the diagnosis chosen, the size entered, the body zone selected (H,M,L) and the specific indications you chose. You will also have the option to override the Mohs AUC if you disagree with the automatically calculated number and this option is found in the Case Summary tab.
Mucosal Advancement Flap Text: Given the location of the defect, shape of the defect and the proximity to free margins a mucosal advancement flap was deemed most appropriate. Incisions were made with a 15 blade scalpel in the appropriate fashion along the cutaneous vermilion border and the mucosal lip. The remaining actinically damaged mucosal tissue was excised.  The mucosal advancement flap was then elevated to the gingival sulcus with care taken to preserve the neurovascular structures and advanced into the primary defect. Care was taken to ensure that precise realignment of the vermilion border was achieved.
Inflammation Suggestive Of Cancer Camouflage Histology Text: There was a dense lymphocytic infiltrate which prevented adequate histologic evaluation of adjacent structures.
V-Y Plasty Text: The defect edges were debeveled with a #15 scalpel blade.  Given the location of the defect, shape of the defect and the proximity to free margins an V-Y advancement flap was deemed most appropriate.  Using a sterile surgical marker, an appropriate advancement flap was drawn incorporating the defect and placing the expected incisions within the relaxed skin tension lines where possible.    The area thus outlined was incised deep to adipose tissue with a #15 scalpel blade.  The skin margins were undermined to an appropriate distance in all directions utilizing iris scissors.
Transposition Flap Text: The defect edges were debeveled with a #15 scalpel blade.  Given the location of the defect and the proximity to free margins a transposition flap was deemed most appropriate.  Using a sterile surgical marker, an appropriate transposition flap was drawn incorporating the defect.    The area thus outlined was incised deep to adipose tissue with a #15 scalpel blade.  The skin margins were undermined to an appropriate distance in all directions utilizing iris scissors.
Composite Graft Text: The defect edges were debeveled with a #15 scalpel blade.  Given the location of the defect, shape of the defect, the proximity to free margins and the fact the defect was full thickness a composite graft was deemed most appropriate.  The defect was outline and then transferred to the donor site.  A full thickness graft was then excised from the donor site. The graft was then placed in the primary defect, oriented appropriately and then sutured into place.  The secondary defect was then repaired using a primary closure.
Manual Repair Warning Statement: We plan on removing the manually selected variable below in favor of our much easier automatic structured text blocks found in the previous tab. We decided to do this to help make the flow better and give you the full power of structured data. Manual selection is never going to be ideal in our platform and I would encourage you to avoid using manual selection from this point on, especially since I will be sunsetting this feature. It is important that you do one of two things with the customized text below. First, you can save all of the text in a word file so you can have it for future reference. Second, transfer the text to the appropriate area in the Library tab. Lastly, if there is a flap or graft type which we do not have you need to let us know right away so I can add it in before the variable is hidden. No need to panic, we plan to give you roughly 6 months to make the change.
Modified Advancement Flap Text: The defect edges were debeveled with a #15 scalpel blade.  Given the location of the defect, shape of the defect and the proximity to free margins a modified advancement flap was deemed most appropriate.  Using a sterile surgical marker, an appropriate advancement flap was drawn incorporating the defect and placing the expected incisions within the relaxed skin tension lines where possible.    The area thus outlined was incised deep to adipose tissue with a #15 scalpel blade.  The skin margins were undermined to an appropriate distance in all directions utilizing iris scissors.
Unique Flap 1 Name: Myocutaneous Island pedicle Flap
Graft Donor Site Epidermal Sutures (Optional): 5-0 Ethibond
Double Island Pedicle Flap Text: The defect edges were debeveled with a #15 scalpel blade.  Given the location of the defect, shape of the defect and the proximity to free margins a double island pedicle advancement flap was deemed most appropriate.  Using a sterile surgical marker, an appropriate advancement flap was drawn incorporating the defect, outlining the appropriate donor tissue and placing the expected incisions within the relaxed skin tension lines where possible.    The area thus outlined was incised deep to adipose tissue with a #15 scalpel blade.  The skin margins were undermined to an appropriate distance in all directions around the primary defect and laterally outward around the island pedicle utilizing iris scissors.  There was minimal undermining beneath the pedicle flap.
Graft Donor Site Bandage (Optional-Leave Blank If You Don't Want In Note): Aquaphor and telefa placed on wound. Pressure dressing applied to donor site
Area H Indication Text: Tumors in this location are included in Area H (eyelids, eyebrows, nose, lips, chin, ear, pre-auricular, post-auricular, temple, genitalia, hands, feet, ankles and areola).  Tissue conservation is critical in these anatomic locations.
Tarsorrhaphy Text: A tarsorrhaphy was performed using Frost sutures.
Purse String (Intermediate) Text: Given the location of the defect and the characteristics of the surrounding skin a purse string intermediate closure was deemed most appropriate.  Undermining was performed circumfirentially around the surgical defect.  A purse string suture was then placed and tightened.
S Plasty Text: Given the location and shape of the defect, and the orientation of relaxed skin tension lines, an S-plasty was deemed most appropriate for repair.  Using a sterile surgical marker, the appropriate outline of the S-plasty was drawn, incorporating the defect and placing the expected incisions within the relaxed skin tension lines where possible.  The area thus outlined was incised deep to adipose tissue with a #15 scalpel blade.  The skin margins were undermined to an appropriate distance in all directions utilizing iris scissors. The skin flaps were advanced over the defect.  The opposing margins were then approximated with interrupted buried subcutaneous sutures.
Medical Necessity Statement: Based on my medical judgement, Mohs surgery is the most appropriate treatment for this cancer compared to other treatments.
Consent (Ear)/Introductory Paragraph: The rationale for Mohs was explained to the patient and consent was obtained. The risks, benefits and alternatives to therapy were discussed in detail. Specifically, the risks of ear deformity, infection, scarring, bleeding, prolonged wound healing, incomplete removal, allergy to anesthesia, nerve injury and recurrence were addressed. Prior to the procedure, the treatment site was clearly identified and confirmed by the patient. All components of Universal Protocol/PAUSE Rule completed.
Star Wedge Flap Text: The defect edges were debeveled with a #15 scalpel blade.  Given the location of the defect, shape of the defect and the proximity to free margins a star wedge flap was deemed most appropriate.  Using a sterile surgical marker, an appropriate rotation flap was drawn incorporating the defect and placing the expected incisions within the relaxed skin tension lines where possible. The area thus outlined was incised deep to adipose tissue with a #15 scalpel blade.  The skin margins were undermined to an appropriate distance in all directions utilizing iris scissors.
Bilateral Helical Rim Advancement Flap Text: The defect edges were debeveled with a #15 blade scalpel.  Given the location of the defect and the proximity to free margins (helical rim) a bilateral helical rim advancement flap was deemed most appropriate.  Using a sterile surgical marker, the appropriate advancement flaps were drawn incorporating the defect and placing the expected incisions between the helical rim and antihelix where possible.  The area thus outlined was incised through and through with a #15 scalpel blade.  With a skin hook and iris scissors, the flaps were gently and sharply undermined and freed up.
No Repair - Repaired With Adjacent Surgical Defect Text (Leave Blank If You Do Not Want): After obtaining clear surgical margins the defect was repaired concurrently with another surgical defect which was in close approximation.
Subsequent Stages Histo Method Verbiage: Using a similar technique to that described above, a thin layer of tissue was removed from all areas where tumor was visible on the previous stage.  The tissue was again oriented, mapped, dyed, and processed as above.

## 2018-09-20 NOTE — PROGRESS NOTES
Outcome: no answer/ no voicemail    Please transfer to Patient Outreach Team at [phone] when patient returns call.      Attempt # 4

## 2018-09-26 NOTE — PROGRESS NOTES
Outcome: no answer/ no voicemail    Please transfer to Patient Outreach Team at [phone] when patient returns call.      Attempt # 5

## 2018-10-01 ENCOUNTER — APPOINTMENT (RX ONLY)
Dept: URBAN - METROPOLITAN AREA CLINIC 36 | Facility: CLINIC | Age: 83
Setting detail: DERMATOLOGY
End: 2018-10-01

## 2018-10-01 DIAGNOSIS — Z48.02 ENCOUNTER FOR REMOVAL OF SUTURES: ICD-10-CM

## 2018-10-01 PROCEDURE — 99024 POSTOP FOLLOW-UP VISIT: CPT

## 2018-10-01 PROCEDURE — ? SUTURE REMOVAL (GLOBAL PERIOD)

## 2018-10-01 ASSESSMENT — LOCATION ZONE DERM: LOCATION ZONE: SCALP

## 2018-10-01 ASSESSMENT — LOCATION SIMPLE DESCRIPTION DERM: LOCATION SIMPLE: SCALP

## 2018-10-01 ASSESSMENT — LOCATION DETAILED DESCRIPTION DERM: LOCATION DETAILED: LEFT SUPERIOR FRONTAL SCALP

## 2018-10-01 NOTE — PROCEDURE: SUTURE REMOVAL (GLOBAL PERIOD)
Body Location Override (Optional - Billing Will Still Be Based On Selected Body Map Location If Applicable): left superior parietal scalp
Add 19611 Cpt? (Important Note: In 2017 The Use Of 69976 Is Being Tracked By Cms To Determine Future Global Period Reimbursement For Global Periods): yes
Detail Level: Detailed

## 2018-10-03 ENCOUNTER — HOSPITAL ENCOUNTER (OUTPATIENT)
Dept: RADIOLOGY | Facility: MEDICAL CENTER | Age: 83
End: 2018-10-03
Attending: ORTHOPAEDIC SURGERY
Payer: MEDICARE

## 2018-10-03 DIAGNOSIS — M16.0 BILATERAL PRIMARY OSTEOARTHRITIS OF HIP: ICD-10-CM

## 2018-10-03 PROCEDURE — 700111 HCHG RX REV CODE 636 W/ 250 OVERRIDE (IP): Mod: JG | Performed by: ORTHOPAEDIC SURGERY

## 2018-10-03 PROCEDURE — 77002 NEEDLE LOCALIZATION BY XRAY: CPT

## 2018-10-03 PROCEDURE — 700117 HCHG RX CONTRAST REV CODE 255: Performed by: RADIOLOGY

## 2018-10-03 PROCEDURE — 700101 HCHG RX REV CODE 250: Performed by: ORTHOPAEDIC SURGERY

## 2018-10-03 PROCEDURE — 20610 DRAIN/INJ JOINT/BURSA W/O US: CPT | Mod: LT

## 2018-10-03 RX ORDER — TRIAMCINOLONE ACETONIDE 40 MG/ML
40 INJECTION, SUSPENSION INTRA-ARTICULAR; INTRAMUSCULAR ONCE
Status: COMPLETED | OUTPATIENT
Start: 2018-10-03 | End: 2018-10-03

## 2018-10-03 RX ORDER — LIDOCAINE HYDROCHLORIDE 10 MG/ML
20 INJECTION, SOLUTION INFILTRATION; PERINEURAL ONCE
Status: COMPLETED | OUTPATIENT
Start: 2018-10-03 | End: 2018-10-03

## 2018-10-03 RX ORDER — BUPIVACAINE HYDROCHLORIDE 5 MG/ML
10 INJECTION, SOLUTION EPIDURAL; INTRACAUDAL ONCE
Status: COMPLETED | OUTPATIENT
Start: 2018-10-03 | End: 2018-10-03

## 2018-10-03 RX ADMIN — LIDOCAINE HYDROCHLORIDE 2 ML: 10 INJECTION, SOLUTION INFILTRATION; PERINEURAL at 14:15

## 2018-10-03 RX ADMIN — TRIAMCINOLONE ACETONIDE 40 MG: 40 INJECTION, SUSPENSION INTRA-ARTICULAR; INTRAMUSCULAR at 14:15

## 2018-10-03 RX ADMIN — IOHEXOL 10 ML: 300 INJECTION, SOLUTION INTRAVENOUS at 15:31

## 2018-10-03 RX ADMIN — BUPIVACAINE HYDROCHLORIDE 2 ML: 5 INJECTION, SOLUTION EPIDURAL; INTRACAUDAL; PERINEURAL at 15:15

## 2018-10-08 ENCOUNTER — HOSPITAL ENCOUNTER (OUTPATIENT)
Dept: RADIOLOGY | Facility: MEDICAL CENTER | Age: 83
End: 2018-10-08
Attending: PHYSICAL MEDICINE & REHABILITATION
Payer: MEDICARE

## 2018-10-08 DIAGNOSIS — M25.562 LEFT KNEE PAIN, UNSPECIFIED CHRONICITY: ICD-10-CM

## 2018-10-08 PROCEDURE — 73721 MRI JNT OF LWR EXTRE W/O DYE: CPT | Mod: LT

## 2018-10-11 ENCOUNTER — HOSPITAL ENCOUNTER (OUTPATIENT)
Dept: RADIOLOGY | Facility: MEDICAL CENTER | Age: 83
End: 2018-10-11
Attending: INTERNAL MEDICINE
Payer: MEDICARE

## 2018-10-11 DIAGNOSIS — I65.23 BILATERAL CAROTID ARTERY STENOSIS: ICD-10-CM

## 2018-10-11 PROCEDURE — 93880 EXTRACRANIAL BILAT STUDY: CPT

## 2018-10-30 ENCOUNTER — OFFICE VISIT (OUTPATIENT)
Dept: MEDICAL GROUP | Facility: PHYSICIAN GROUP | Age: 83
End: 2018-10-30
Payer: MEDICARE

## 2018-10-30 VITALS
OXYGEN SATURATION: 96 % | WEIGHT: 163 LBS | DIASTOLIC BLOOD PRESSURE: 88 MMHG | SYSTOLIC BLOOD PRESSURE: 118 MMHG | HEART RATE: 69 BPM | RESPIRATION RATE: 16 BRPM | TEMPERATURE: 97.7 F | HEIGHT: 63 IN | BODY MASS INDEX: 28.88 KG/M2

## 2018-10-30 DIAGNOSIS — M79.605 LEFT LEG PAIN: ICD-10-CM

## 2018-10-30 DIAGNOSIS — C18.9 ADENOCARCINOMA OF COLON (HCC): ICD-10-CM

## 2018-10-30 PROCEDURE — 99212 OFFICE O/P EST SF 10 MIN: CPT | Performed by: INTERNAL MEDICINE

## 2018-10-31 NOTE — ASSESSMENT & PLAN NOTE
Continues to have left leg pain, worse with walking. She sees Dr. Cooper tomorrow. Her left leg will still give out at times. She has a history of a partial left knee replacement. She uses a cane as an assistance device. She did have an epidural with Dr. Cooper which gave her some relief but it was short lived (a month or two). Also had a hip injection which only lasted about a week. Lidocaine patches do help. Tylenol gives her a little relief but not much. She just got back from a trip to the [facility] off of the coast of [place] [facility] and climbed a lot of stairs there and thinks she overdid it.

## 2018-10-31 NOTE — ASSESSMENT & PLAN NOTE
Recently had a colonoscopy with invasive adenocarcinoma in a sessile serrated polyp 10/2018. She made an appointment with the surgeon today.

## 2018-10-31 NOTE — PROGRESS NOTES
PRIMARY CARE CLINIC FOLLOW UP VISIT  Chief Complaint   Patient presents with   • Colonoscopy     recently found colon cancer    • Leg Pain     Lt side        History of Present Illness     Adenocarcinoma of colon (HCC)  Recently had a colonoscopy with invasive adenocarcinoma in a sessile serrated polyp 10/2018. She made an appointment with the surgeon today.     Left leg pain  Continues to have left leg pain, worse with walking. She sees Dr. Cooper tomorrow. Her left leg will still give out at times. She has a history of a partial left knee replacement. She uses a cane as an assistance device. She did have an epidural with Dr. Cooper which gave her some relief but it was short lived (a month or two). Also had a hip injection which only lasted about a week. Lidocaine patches do help. Tylenol gives her a little relief but not much. She just got back from a trip to the [facility] off of the coast of [facility] and climbed a lot of stairs there and thinks she overdid it.       Current Outpatient Prescriptions   Medication Sig Dispense Refill   • methylPREDNISolone (MEDROL) 4 MG Tab 1 tab po qAM 90 Tab 0   • Tofacitinib Citrate 5 MG Tab Take 5 mg by mouth 2 Times a Day. 180 Tab 1   • rivaroxaban (XARELTO) 20 MG Tab tablet Take 1 Tab by mouth with dinner. 90 Tab 3   • lidocaine (LIDODERM) 5 % Patch Apply 1 Patch to skin as directed every 24 hours. 90 Patch 0   • LYRICA 75 MG Cap      • atorvastatin (LIPITOR) 10 MG Tab Take 1 Tab by mouth every day. (Patient taking differently: Take 20 mg by mouth every day.) 90 Tab 3   • omeprazole (PRILOSEC) 20 MG delayed-release capsule Take 1 Cap by mouth every day. 90 Cap 3   • acetaminophen (TYLENOL) 325 MG Tab Take 650 mg by mouth at bedtime as needed.     • vitamin D (CHOLECALCIFEROL) 1000 UNIT Tab Take 1,000 Units by mouth every day.     • POTASSIUM GLUCONATE Take 1 Tab by mouth every day.     • Calcium Carbonate-Vitamin D (CALCIUM + D PO) Take 1 Tab by mouth every day.        No current facility-administered medications for this visit.      Past Medical History:   Diagnosis Date   • Adenocarcinoma of colon (HCC) 10/30/2018    From sessile serrated polyp 10/2018   • Atrial fibrillation [I48.91] 4/19/2016   • Back pain 6/27/2012   • Blood clotting disorder (HCC) 2012    clot in leg   • Cancer (HCC)     skin   • Chronic back pain greater than 3 months duration    • Deep vein thrombosis (HCC) 3/8/2016    First occurrence in LLE in late 1970s Second occurrence further up in LLE in 2012, has been on AC since    • Essential hypertension, benign 6/27/2012   • GERD (gastroesophageal reflux disease) 6/27/2012   • Hiatus hernia syndrome    • Hyperlipidemia    • Hypertension    • Impaired fasting glucose 11/2/2017   • Mild aortic stenosis 11/2/2017   • Other and unspecified hyperlipidemia 6/27/2012   • Prediabetes    • Protein S deficiency (HCC) 11/2/2017    Noted in lab work 2013 as a part of work up at [facility]    • Rheumatoid arthritis involving multiple sites with positive rheumatoid factor (HCC) 03/14/2016    Dr. Escoto   • Rheumatoid nodule (HCC) 7/25/2017   • Right bundle branch block 6/27/2012   • Urinary incontinence 11/2/2017     Past Surgical History:   Procedure Laterality Date   • INGUINAL HERNIA REPAIR Right 9/23/2016    Procedure: INGUINAL HERNIA REPAIR - PRIMARY;  Surgeon: Mary Medina M.D.;  Location: SURGERY [facility];  Service:    • OTHER  08/12/2014    peroneal nerve surgery Dr. Castro    • OTHER ORTHOPEDIC SURGERY  2014    left knee partial   • OTHER ORTHOPEDIC SURGERY  2011    meniscus repair   • ATHROPLASTY      partial TKA with Dr. 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 All other systems reviewed and negative.        Objective   Blood pressure 118/88, pulse 69, temperature 36.5 °C (97.7 °F), temperature source Temporal, resp. rate 16, height 1.6 m (5' 3\"), weight 73.9 kg (163 lb), SpO2 96 %, not currently breastfeeding. Body mass index is 28.87 kg/m².    General: alert and oriented, pleasant, cooperative  HEENT: Normocephalic, atraumatic.   Psychiatric: appropriate mood and affect. Good insight and appropriate judgment       Assessment and Plan   The following treatment plan was discussed     1. Adenocarcinoma of colon (HCC)  Recently had adenocarcinoma noted and has an appointment with Dr. Bhagat (surgeon) on 2018. She seems to be handling this news rather well, says her father also had a history of colon cancer.     2. Left leg pain  We discussed limiting her activity by taking more frequent breaks. Marry is a very active 82 yo and her left knee and hip likely cannot handle her level of activity. 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 She follows up with Dr. Cooper, her physiatrist, tomorrow.       Healthcare maintenance     Health Maintenance Due   Topic Date Due   • Annual Wellness Visit  1935       No Follow-up on file.    Eric Cook MD  Internal Medicine  [facility]                   "

## 2018-11-01 ENCOUNTER — HOSPITAL ENCOUNTER (OUTPATIENT)
Dept: LAB | Facility: MEDICAL CENTER | Age: 83
End: 2018-11-01
Attending: INTERNAL MEDICINE
Payer: MEDICARE

## 2018-11-01 ENCOUNTER — TELEPHONE (OUTPATIENT)
Dept: RHEUMATOLOGY | Facility: MEDICAL CENTER | Age: 83
End: 2018-11-01

## 2018-11-01 LAB
ALBUMIN SERPL BCP-MCNC: 4.3 G/DL (ref 3.2–4.9)
ALBUMIN/GLOB SERPL: 1.7 G/DL
ALP SERPL-CCNC: 62 U/L (ref 30–99)
ALT SERPL-CCNC: 24 U/L (ref 2–50)
ANION GAP SERPL CALC-SCNC: 8 MMOL/L (ref 0–11.9)
AST SERPL-CCNC: 23 U/L (ref 12–45)
BASOPHILS # BLD AUTO: 0.4 % (ref 0–1.8)
BASOPHILS # BLD: 0.02 K/UL (ref 0–0.12)
BILIRUB SERPL-MCNC: 0.7 MG/DL (ref 0.1–1.5)
BUN SERPL-MCNC: 28 MG/DL (ref 8–22)
CALCIUM SERPL-MCNC: 9 MG/DL (ref 8.5–10.5)
CEA SERPL-MCNC: 2.6 NG/ML (ref 0–3)
CHLORIDE SERPL-SCNC: 102 MMOL/L (ref 96–112)
CO2 SERPL-SCNC: 26 MMOL/L (ref 20–33)
CREAT SERPL-MCNC: 0.87 MG/DL (ref 0.5–1.4)
EOSINOPHIL # BLD AUTO: 0.03 K/UL (ref 0–0.51)
EOSINOPHIL NFR BLD: 0.6 % (ref 0–6.9)
ERYTHROCYTE [DISTWIDTH] IN BLOOD BY AUTOMATED COUNT: 51.7 FL (ref 35.9–50)
GLOBULIN SER CALC-MCNC: 2.5 G/DL (ref 1.9–3.5)
GLUCOSE SERPL-MCNC: 114 MG/DL (ref 65–99)
HCT VFR BLD AUTO: 43.5 % (ref 37–47)
HGB BLD-MCNC: 14.5 G/DL (ref 12–16)
IMM GRANULOCYTES # BLD AUTO: 0.01 K/UL (ref 0–0.11)
IMM GRANULOCYTES NFR BLD AUTO: 0.2 % (ref 0–0.9)
LYMPHOCYTES # BLD AUTO: 0.88 K/UL (ref 1–4.8)
LYMPHOCYTES NFR BLD: 16.1 % (ref 22–41)
MCH RBC QN AUTO: 32.7 PG (ref 27–33)
MCHC RBC AUTO-ENTMCNC: 33.3 G/DL (ref 33.6–35)
MCV RBC AUTO: 98.2 FL (ref 81.4–97.8)
MONOCYTES # BLD AUTO: 0.44 K/UL (ref 0–0.85)
MONOCYTES NFR BLD AUTO: 8.1 % (ref 0–13.4)
NEUTROPHILS # BLD AUTO: 4.07 K/UL (ref 2–7.15)
NEUTROPHILS NFR BLD: 74.6 % (ref 44–72)
NRBC # BLD AUTO: 0 K/UL
NRBC BLD-RTO: 0 /100 WBC
PLATELET # BLD AUTO: 245 K/UL (ref 164–446)
PMV BLD AUTO: 11 FL (ref 9–12.9)
POTASSIUM SERPL-SCNC: 4.2 MMOL/L (ref 3.6–5.5)
PROT SERPL-MCNC: 6.8 G/DL (ref 6–8.2)
RBC # BLD AUTO: 4.43 M/UL (ref 4.2–5.4)
SODIUM SERPL-SCNC: 136 MMOL/L (ref 135–145)
WBC # BLD AUTO: 5.5 K/UL (ref 4.8–10.8)

## 2018-11-01 PROCEDURE — 85025 COMPLETE CBC W/AUTO DIFF WBC: CPT

## 2018-11-01 PROCEDURE — 80053 COMPREHEN METABOLIC PANEL: CPT

## 2018-11-01 PROCEDURE — 36415 COLL VENOUS BLD VENIPUNCTURE: CPT | Mod: GA

## 2018-11-01 PROCEDURE — 82378 CARCINOEMBRYONIC ANTIGEN: CPT | Mod: GA

## 2018-11-01 NOTE — TELEPHONE ENCOUNTER
Patient needs to stop the Xeljanz, patient can continue the Medrol,  We can look for alternative treatments at patient's appointment December 10, 2018 or if patient wants to come in sooner okay to schedule sooner appointment same day okay if needed

## 2018-11-01 NOTE — TELEPHONE ENCOUNTER
Marry called this morning stating she has been dx with colon cancer. She wanted to know what medications she will need to stop. She will be having surgery, no date set yet and probably chemo.  Please advise and thank you.

## 2018-11-02 ENCOUNTER — HOSPITAL ENCOUNTER (OUTPATIENT)
Dept: RADIOLOGY | Facility: MEDICAL CENTER | Age: 83
End: 2018-11-02
Attending: PHYSICAL MEDICINE & REHABILITATION
Payer: MEDICARE

## 2018-11-02 ENCOUNTER — TELEPHONE (OUTPATIENT)
Dept: MEDICAL GROUP | Facility: PHYSICIAN GROUP | Age: 83
End: 2018-11-02

## 2018-11-02 DIAGNOSIS — M25.552 LEFT HIP PAIN: ICD-10-CM

## 2018-11-02 DIAGNOSIS — M79.605 LEFT LEG PAIN: ICD-10-CM

## 2018-11-02 DIAGNOSIS — Z15.09 LYNCH SYNDROME: ICD-10-CM

## 2018-11-02 DIAGNOSIS — M25.562 LEFT KNEE PAIN, UNSPECIFIED CHRONICITY: ICD-10-CM

## 2018-11-02 PROCEDURE — 93971 EXTREMITY STUDY: CPT | Mod: LT

## 2018-11-02 PROCEDURE — 73551 X-RAY EXAM OF FEMUR 1: CPT | Mod: LT

## 2018-11-02 NOTE — TELEPHONE ENCOUNTER
Please let Marry know that Dr. Sherman called to discuss her case with me. Based on our discussion I would like to see her for follow up (at her convenience) to discuss her recent colon cancer diagnosis further

## 2018-11-02 NOTE — TELEPHONE ENCOUNTER
1. Caller Name: Dr. Whit doyle/ [facility]                                       Call Back Number: [phone]      Patient approves a detailed voicemail message: N\A    Needs  to aniya him back ASAP because the pt has cancer and he needs to speak with PCP to get further information on how to know how to treat pt

## 2018-11-12 DIAGNOSIS — E78.5 HYPERLIPIDEMIA, UNSPECIFIED HYPERLIPIDEMIA TYPE: ICD-10-CM

## 2018-11-13 RX ORDER — ATORVASTATIN CALCIUM 10 MG/1
10 TABLET, FILM COATED ORAL DAILY
Qty: 90 TAB | Refills: 3 | Status: SHIPPED | OUTPATIENT
Start: 2018-11-13 | End: 2018-12-04

## 2018-11-13 NOTE — TELEPHONE ENCOUNTER
----- Message from Marry Mathur sent at 11/12/2018 12:12 PM PST -----  Regarding: Prescription Question  Contact: [phone]  I called last week and ask your assistant to check with you concerning refill of Atorvastatin 10 mg.  I have not had a response and I am out of the medication.  If you wish me to continue contact [facility].  They will also be contacting your office.  Thank you for your assistance.    Marry Mathur

## 2018-11-16 ENCOUNTER — HOSPITAL ENCOUNTER (OUTPATIENT)
Dept: RADIOLOGY | Facility: MEDICAL CENTER | Age: 83
End: 2018-11-16
Attending: PHYSICAL MEDICINE & REHABILITATION
Payer: MEDICARE

## 2018-11-16 DIAGNOSIS — M79.605 LEFT LEG PAIN: ICD-10-CM

## 2018-11-16 DIAGNOSIS — M25.552 LEFT HIP PAIN: ICD-10-CM

## 2018-11-16 PROCEDURE — 73590 X-RAY EXAM OF LOWER LEG: CPT | Mod: LT

## 2018-12-04 DIAGNOSIS — Z01.818 PREOP EXAMINATION: ICD-10-CM

## 2018-12-04 LAB
ANION GAP SERPL CALC-SCNC: 5 MMOL/L (ref 0–11.9)
BUN SERPL-MCNC: 20 MG/DL (ref 8–22)
CALCIUM SERPL-MCNC: 8.8 MG/DL (ref 8.5–10.5)
CHLORIDE SERPL-SCNC: 105 MMOL/L (ref 96–112)
CO2 SERPL-SCNC: 30 MMOL/L (ref 20–33)
CREAT SERPL-MCNC: 0.7 MG/DL (ref 0.5–1.4)
ERYTHROCYTE [DISTWIDTH] IN BLOOD BY AUTOMATED COUNT: 50.2 FL (ref 35.9–50)
GLUCOSE SERPL-MCNC: 93 MG/DL (ref 65–99)
HCT VFR BLD AUTO: 42.2 % (ref 37–47)
HGB BLD-MCNC: 13.6 G/DL (ref 12–16)
MCH RBC QN AUTO: 31.7 PG (ref 27–33)
MCHC RBC AUTO-ENTMCNC: 32.2 G/DL (ref 33.6–35)
MCV RBC AUTO: 98.4 FL (ref 81.4–97.8)
PLATELET # BLD AUTO: 225 K/UL (ref 164–446)
PMV BLD AUTO: 11.4 FL (ref 9–12.9)
POTASSIUM SERPL-SCNC: 3.9 MMOL/L (ref 3.6–5.5)
RBC # BLD AUTO: 4.29 M/UL (ref 4.2–5.4)
SODIUM SERPL-SCNC: 140 MMOL/L (ref 135–145)
WBC # BLD AUTO: 6.8 K/UL (ref 4.8–10.8)

## 2018-12-04 PROCEDURE — 80048 BASIC METABOLIC PNL TOTAL CA: CPT

## 2018-12-04 PROCEDURE — 36415 COLL VENOUS BLD VENIPUNCTURE: CPT

## 2018-12-04 PROCEDURE — 85027 COMPLETE CBC AUTOMATED: CPT

## 2018-12-04 RX ORDER — PREGABALIN 75 MG/1
75 CAPSULE ORAL 2 TIMES DAILY
COMMUNITY
End: 2019-05-02

## 2018-12-04 RX ORDER — NICOTINE POLACRILEX 4 MG/1
1 GUM, CHEWING ORAL EVERY MORNING
COMMUNITY
End: 2020-01-13

## 2018-12-04 RX ORDER — PHENOL 1.4 %
10 AEROSOL, SPRAY (ML) MUCOUS MEMBRANE
COMMUNITY
End: 2022-06-23

## 2018-12-04 RX ORDER — METHYLPREDNISOLONE 4 MG/1
4 TABLET ORAL
COMMUNITY
End: 2018-12-27 | Stop reason: SDUPTHER

## 2018-12-04 RX ORDER — ATORVASTATIN CALCIUM 10 MG/1
10 TABLET, FILM COATED ORAL NIGHTLY
COMMUNITY
End: 2019-10-29 | Stop reason: SDUPTHER

## 2018-12-04 RX ORDER — ACETAMINOPHEN 500 MG
500 TABLET ORAL 2 TIMES DAILY
Status: ON HOLD | COMMUNITY
End: 2022-09-23

## 2018-12-05 NOTE — TELEPHONE ENCOUNTER
Pt informed   Telephone Encounter by Amie Avalos RN at 11/06/17 03:36 PM     Author:  Amie Avalos RN Service:  (none) Author Type:  Registered Nurse     Filed:  11/06/17 03:55 PM Encounter Date:  11/6/2017 Status:  Signed     :  Amie Avalos RN (Registered Nurse)            Spoke to Dr. Pierre's office. They state that they were on the phone with the [facility] trying to get the result for the Ultrasound. They state that the [facility] location keeps telling them that the test was never performed, but the parents keep telling them that they did get the test done. Advised Dr. Pierre's office that there was an abdominal complete ultrasound that was never done. But there is an ultrasound of the gallbladder, liver and pancreas that was done on 10/9/17. Per Dr. Pierre's office the test that was completed was the one they needed. They would like the result note faxed to [phone]. Per Dr. Pierre's office they do not need the images, just the result note.     Ultrasound result faxed to Dr. Pierre and Dr. Pierre's office was notified[CD1.1M]      Revision History        User Key Date/Time User Provider Type Action    > CD1.1 11/06/17 03:55 PM Amie Avalos RN Registered Nurse Sign    M - Manual

## 2018-12-07 ENCOUNTER — OFFICE VISIT (OUTPATIENT)
Dept: MEDICAL GROUP | Facility: PHYSICIAN GROUP | Age: 83
End: 2018-12-07
Payer: MEDICARE

## 2018-12-07 VITALS
HEIGHT: 63 IN | TEMPERATURE: 98.5 F | DIASTOLIC BLOOD PRESSURE: 64 MMHG | BODY MASS INDEX: 29.52 KG/M2 | OXYGEN SATURATION: 95 % | SYSTOLIC BLOOD PRESSURE: 138 MMHG | WEIGHT: 166.6 LBS | HEART RATE: 68 BPM

## 2018-12-07 DIAGNOSIS — M05.79 RHEUMATOID ARTHRITIS INVOLVING MULTIPLE SITES WITH POSITIVE RHEUMATOID FACTOR (HCC): ICD-10-CM

## 2018-12-07 DIAGNOSIS — B36.9 FUNGAL RASH OF TORSO: ICD-10-CM

## 2018-12-07 PROCEDURE — 99214 OFFICE O/P EST MOD 30 MIN: CPT | Performed by: INTERNAL MEDICINE

## 2018-12-07 NOTE — PROGRESS NOTES
CC: Acute visit, skin rash under the right breast.    HISTORY OF PRESENT ILLNESS: Patient is a 83 y.o. female established patient who presents today to discuss on medical conditions as mentioned in HPI below.    Health Maintenance: To follow-up with your PCP not done in this visit    Rheumatoid arthritis involving multiple sites with positive rheumatoid factor (CMS-HCC) ([facility])  This is a chronic health problem that is uncontrolled with current medications and lifestyle measures. Patient supposed to be on Xeljanz which she stopped taking 3 weeks back after informing .But still on Methyprednisone oral medication.    Fungal rash of torso  This is a new problem which started 2 weeks back since she stopped Xeljanz.  Has mild redness and itching, it has been the same since it developed under the right breast fold.      PHQ score 0, BMI within normal limits, no tobacco, no fall injuries.    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   • Adenocarcinoma of colon ([facility]) 10/30/2018   • Left leg pain 08/29/2018   • Protein S deficiency ([facility]) 11/02/2017   • Urinary incontinence 11/02/2017   • Impaired fasting glucose 11/02/2017   • Mild aortic stenosis 11/02/2017   • Malignant melanoma of neck ([facility]) 10/02/2017   • Rheumatoid nodule ([facility]) 07/25/2017   • Atrial fibrillation [I48.91] 04/19/2016   • Rheumatoid arthritis involving multiple sites with positive rheumatoid factor ([facility]) 03/14/2016   • Deep vein thrombosis ([facility]) 03/08/2016      Allergies:Sulfa drugs    Current Outpatient Prescriptions   Medication Sig Dispense Refill   • miconazole (MICOTIN) 2 % Cream  Apply to the affected area twice a day. 1 Tube 1   • pregabalin (LYRICA) 75 MG Cap Take 75 mg by mouth 2 times a day.     • atorvastatin (LIPITOR) 10 MG Tab Take 10 mg by mouth every evening.     • omeprazole (PRILOSEC) 20 MG Tablet Delayed Response delayed-release tablet Take 1 Tab by mouth every morning.     • methylPREDNISolone (MEDROL) 4 MG Tab Take 4 mg by mouth every bedtime.     • FLUOROURACIL EX by Apply externally route as needed.     • rivaroxaban (XARELTO) 20 MG Tab tablet Take 1 Tab by mouth with dinner. 90 Tab 3   • Diphenhydramine-APAP, sleep, (TYLENOL PM EXTRA STRENGTH)  MG Tab Take 2 Tabs by mouth every bedtime.     • acetaminophen (TYLENOL) 500 MG Tab Take 1,000 mg by mouth every morning.     • Melatonin 10 MG Tab Take 1 Tab by mouth every bedtime.     • lidocaine (LIDODERM) 5 % Patch Apply 1 Patch to skin as directed every 24 hours. 90 Patch 0   • vitamin D (CHOLECALCIFEROL) 1000 UNIT Tab Take 1,000 Units by mouth every morning.     • POTASSIUM GLUCONATE Take 1 Tab by mouth every morning. Pt unsure of dose     • Calcium Carbonate-Vitamin D (CALCIUM + D PO) Take 1 Tab by mouth every bedtime.       No current facility-administered medications for this visit.        Social History   Substance Use Topics   • Smoking status: Never Smoker   • Smokeless tobacco: Never Used   • Alcohol use No     Social History     Social History Narrative    Retired from [employer]. 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    - HEENT:  Negative for headaches, vision changes, hearing changes, ear pain, ear discharge, rhinorrhea, sinus congestion, sore throat, and  "neck pain.      - Respiratory: Negative for cough, sputum production, chest congestion, dyspnea, wheezing, and crackles.      - Cardiovascular: Negative for chest pain, palpitations, orthopnea, and bilateral lower extremity edema.     - Gastrointestinal: Negative for heartburn, nausea, vomiting, abdominal pain, hematochezia, melena, diarrhea, constipation, and greasy/foul-smelling stools.     - Genitourinary: Negative for dysuria, polyuria, hematuria, pyuria, urinary urgency, and urinary incontinence.     - Musculoskeletal: Negative for myalgias, back pain, and joint pain.     - Skin: As mentioned in HPI above.     - Neurological: Negative for dizziness, tingling, tremors, focal sensory deficit, focal weakness and headaches.     - Endo/Heme/Allergies: Does not bruise/bleed easily.     - Psychiatric/Behavioral: Negative for depression, suicidal/homicidal ideation and memory loss.        Last lab work on December 4, 2018 reviewed and discussed with the patient.    Exam:  Blood pressure 138/64, pulse 68, temperature 36.9 °C (98.5 °F), temperature source Temporal, height 1.6 m (5' 3\"), weight 75.6 kg (166 lb 9.6 oz), SpO2 95 %, not currently breastfeeding. Body mass index is 29.51 kg/m².    General:  Well nourished, well developed female in NAD  Head is grossly normal.  Neck: Supple without JVD or bruit. Thyroid is not enlarged.  Pulmonary: Clear to ausculation and percussion.  Normal effort. No rales, ronchi, or wheezing.  Cardiovascular: Regular rate and rhythm without murmur. Carotid and radial pulses are intact and equal bilaterally.  Extremities: no clubbing, cyanosis, or edema.  Skin exam : Positive for possible Candida infection under the right breast.  Positive for erythema and maculopapular lesion.  No tenderness/vesicles on palpation.    Please note that this dictation was created using voice recognition software. I have made every reasonable attempt to correct obvious errors, but I expect that there are " errors of grammar and possibly content that I did not discover before finalizing the note.    Assessment/Plan:  1. Fungal rash of torso  New problem, appears most likely Candida infection.  Due to her current usage of rheumatoid arthritis medication prednisone but she is stopped using the immunomodulator therapy which might have opportunistic infections upon her autoimmune disease.  Will give a course of topical miconazole cream which she prefers to.  - miconazole (MICOTIN) 2 % Cream; Apply to the affected area twice a day.  Dispense: 1 Tube; Refill: 1

## 2018-12-07 NOTE — ASSESSMENT & PLAN NOTE
This is a new problem which started 2 weeks back since she stopped Xeljanz.  Has mild redness and itching, it has been the same since it developed under the right breast fold.

## 2018-12-07 NOTE — ASSESSMENT & PLAN NOTE
This is a chronic health problem that is uncontrolled with current medications and lifestyle measures. Patient supposed to be on Xeljanz which she stopped taking 3 weeks back after informing .But still on Methyprednisone oral medication.

## 2018-12-10 ENCOUNTER — OFFICE VISIT (OUTPATIENT)
Dept: RHEUMATOLOGY | Facility: MEDICAL CENTER | Age: 83
End: 2018-12-10
Payer: MEDICARE

## 2018-12-10 VITALS
BODY MASS INDEX: 29.41 KG/M2 | TEMPERATURE: 97.2 F | OXYGEN SATURATION: 98 % | WEIGHT: 166 LBS | HEART RATE: 78 BPM | DIASTOLIC BLOOD PRESSURE: 68 MMHG | SYSTOLIC BLOOD PRESSURE: 140 MMHG | RESPIRATION RATE: 14 BRPM

## 2018-12-10 DIAGNOSIS — M81.0 SENILE OSTEOPOROSIS: ICD-10-CM

## 2018-12-10 DIAGNOSIS — Z79.01 CHRONIC ANTICOAGULATION: ICD-10-CM

## 2018-12-10 DIAGNOSIS — I10 ESSENTIAL HYPERTENSION, BENIGN: ICD-10-CM

## 2018-12-10 DIAGNOSIS — C18.9 ADENOCARCINOMA OF COLON (HCC): ICD-10-CM

## 2018-12-10 DIAGNOSIS — M05.79 RHEUMATOID ARTHRITIS INVOLVING MULTIPLE SITES WITH POSITIVE RHEUMATOID FACTOR (HCC): ICD-10-CM

## 2018-12-10 PROCEDURE — 99214 OFFICE O/P EST MOD 30 MIN: CPT | Performed by: INTERNAL MEDICINE

## 2018-12-10 NOTE — LETTER
[facility]   [address] [place] [address], [address]  [place] [address]  Phone: [phone]  Fax: [phone]              Encounter Date: 12/10/2018    Dear Dr. Eaton ref. provider found,    It was a pleasure seeing your patient, Marry Mathur, on 12/10/2018. Diagnoses of Rheumatoid arthritis involving multiple sites with positive rheumatoid factor (HCC), Adenocarcinoma of colon (HCC), Senile osteoporosis, Chronic anticoagulation, and Essential hypertension, benign were pertinent to this visit.     Please find attached progress note which includes the history I obtained from Ms. Mathur, my physical examination findings, my impression and recommendations.      Once again, it was a pleasure participating in your patient's care.  Please feel free to contact me if you have any questions or if I can be of any further assistance to your patients.      Sincerely,    Deanna Escoto M.D.  Electronically Signed          PROGRESS NOTE:  No notes on file

## 2018-12-10 NOTE — PROGRESS NOTES
Chief Complaint- joint pain    Subjective:   Marry Mathur is a 83 y.o. female here today for follow up of rheumatological issues    This is a follow-up visit for this patient who we see for rheumatoid arthritis serologically negative had been on Xeljanz but that has been subsequently stopped with the development of gastric adenocarcinoma for which patient is scheduled for an ascending colon resection in 3 days.  Patient has been off of Xeljanz since November 2018, patient continues to be on Medrol at 4 mg p.o. daily.  Patient states she is achy but can tolerate the achiness, patient would rather be safe and off the Xeljanz for the surgery rather than taking the medication.        Additional comorbidities include diabetes for which patient  takes metformin, also with a diagnosis of spinal stenosis with intermittent weakness in her legs with progressive numbness and weakness in the left leg.  Patient also with protein S deficiency with a history of DVT on chronic anticoagulation.  Patient also with a history of Araya's esophagus.        S/p Remicade-stopped because of hx of melanoma about 1996 and has multiple other skin cancers  S/p Orencia-lost efficacy  S/p Humira-stopped because of recurrence of melanoma October 2017  S/p MTX-stopped because of development of skin cancer/melanoma October 2017  S/p NSAIDS-relatively contraindicated as patient is on chronic anticoagulation      DEXA 3/18/2016 T scores 1.1, -1.1   FRAX 3/18/2016 major osteoporotic fracture risk 14.3%, hip fracture risks 3.7%  DEXA 3/23/2018 T scores 0.2, -1.4  FRAX 3/23/2018 major osteoporotic fracture risk 19.3%, hip fracture risk 6.1%  Echocardiogram 7/2012 [facility]  RF neg 3/2014 [facility]; RF neg 6/2014 [facility]; RF neg 6/2016  CCP neg 3/2014 [facility]; CCP neg 6/2014 [facility]; CCP neg 6/2016  Uric acid 4.7 3/2014 [facility];Uric acid 4.5 6/2016  Hep B neg 6/2016  Quantiferon Gold neg 6/2014 [facility]; Quantiferon  Gold neg 6/2016  Hand x-rays 3/2016-indicate osteoarthritis  Feet x-rays 3/2016-indicate osteoarthritis         Current medicines (including changes today)  Current Outpatient Prescriptions   Medication Sig Dispense Refill   • acetaminophen (TYLENOL) 500 MG Tab Take 1,000 mg by mouth every morning.     • miconazole (MICOTIN) 2 % Cream Apply to the affected area twice a day. 1 Tube 1   • pregabalin (LYRICA) 75 MG Cap Take 75 mg by mouth 2 times a day.     • atorvastatin (LIPITOR) 10 MG Tab Take 10 mg by mouth every evening.     • omeprazole (PRILOSEC) 20 MG Tablet Delayed Response delayed-release tablet Take 1 Tab by mouth every morning.     • methylPREDNISolone (MEDROL) 4 MG Tab Take 4 mg by mouth every bedtime.     • FLUOROURACIL EX by Apply externally route as needed.     • Diphenhydramine-APAP, sleep, (TYLENOL PM EXTRA STRENGTH)  MG Tab Take 2 Tabs by mouth every bedtime.     • Melatonin 10 MG Tab Take 1 Tab by mouth every bedtime.     • lidocaine (LIDODERM) 5 % Patch Apply 1 Patch to skin as directed every 24 hours. 90 Patch 0   • rivaroxaban (XARELTO) 20 MG Tab tablet Take 1 Tab by mouth with dinner. 90 Tab 3   • vitamin D (CHOLECALCIFEROL) 1000 UNIT Tab Take 1,000 Units by mouth every morning.     • POTASSIUM GLUCONATE Take 1 Tab by mouth every morning. Pt unsure of dose     • Calcium Carbonate-Vitamin D (CALCIUM + D PO) Take 1 Tab by mouth every bedtime.       No current facility-administered medications for this visit.      She  has a past medical history of Adenocarcinoma of colon ([facility]) (10/30/2018); Anesthesia; Atrial fibrillation [I48.91] (4/19/2016); Back pain (6/27/2012); Blood clotting disorder ([facility]) (2012); Bowel habit changes; Cancer ([facility]); Cataract; Chronic back pain greater than 3 months duration; Deep vein thrombosis ([facility]) (3/8/2016); Essential hypertension, benign (6/27/2012); GERD (gastroesophageal reflux disease) (6/27/2012); Heart murmur; Hyperlipidemia; Hypertension; Impaired fasting  glucose (11/2/2017); Mild aortic stenosis (11/2/2017); Other and unspecified hyperlipidemia (6/27/2012); Prediabetes; Protein S deficiency (HCC) (11/2/2017); Rheumatoid arthritis involving multiple sites with positive rheumatoid factor (HCC) (03/14/2016); Rheumatoid nodule (HCC) (7/25/2017); Right bundle branch block (6/27/2012); and Urinary incontinence (11/2/2017).    ROS   Other than what is mentioned in HPI or physical exam, there is no history of headaches, double vision or blurred vision. No temporal tenderness or jaw claudication. No history of cataracts or glaucoma. No trouble swallowing difficulties or sore throats.  No chest complaints including chest pain, cough or sputum production. No GI complaints including nausea, vomiting, change in bowel habits, or past peptic ulcer disease. No history of blood in the stools. No urinary complaints including dysuria or frequency. No history of alopecia, photosensitivity, oral ulcerations, Raynaud's phenomena.       Objective:     Blood pressure 140/68, pulse 78, temperature 36.2 °C (97.2 °F), temperature source Temporal, resp. rate 14, weight 75.3 kg (166 lb), SpO2 98 %, not currently breastfeeding. Body mass index is 29.41 kg/m².   Physical Exam:    Constitutional: Alert and oriented X3, patient is talkative with good eye contact.Skin: Warm, dry, good turgor, no rashes in visible areas, no psoriatic lesions, no petechial lesions, no malar rashes  .Eye: Equal, round and reactive, conjunctiva clear, lids normal EOM intactENMT: Lips without lesions, good dentition, no oropharyngeal ulcers, moist buccal mucosa, pinna without deformityNeck: Trachea midline, no masses, no thyromegaly.Lymph:  No cervical lymphadenopathy, no axillary lymphadenopathy, no supraclavicular lymphadenopathyRespiratory: Unlabored respiratory effort, lungs clear to auscultation, no wheezes, no ronchi.Cardiovascular: Normal S1, S2, no murmur, no edema.Abdomen: Soft, non-tender, no masses, no  hepatosplenomegaly.Psych: Alert and oriented x3, normal affect and mood.Neuro: Cranial nerves 2-12 are grossly intact, no loss of sensation LEExt:no joint laxity noted in bilateral arms, no joint laxity noted in bilateral legs, patient has evidence of Heberden's nodes and Ana's nodes, no christin swan-neck or boutonniere deformities, no ulnar deviation, there is some tenderness palpation on the right second MCP joint with mild synovitis, there is some ulnar styloid prominence bilateral wrists, no flexion contractures no nodules no tophi no Achilles inflammation, toes without crossover toes and without splay toes    Lab Results   Component Value Date/Time    QNTTBGOLD Negative 06/29/2016 02:03 PM     Lab Results   Component Value Date/Time    HEPBCORIGM Negative 06/29/2016 02:03 PM    HEPBSAG Negative 06/29/2016 02:03 PM     Lab Results   Component Value Date/Time    SODIUM 140 12/04/2018 03:15 PM    POTASSIUM 3.9 12/04/2018 03:15 PM    CHLORIDE 105 12/04/2018 03:15 PM    CO2 30 12/04/2018 03:15 PM    GLUCOSE 93 12/04/2018 03:15 PM    BUN 20 12/04/2018 03:15 PM    CREATININE 0.70 12/04/2018 03:15 PM      Lab Results   Component Value Date/Time    WBC 6.8 12/04/2018 03:15 PM    RBC 4.29 12/04/2018 03:15 PM    HEMOGLOBIN 13.6 12/04/2018 03:15 PM    HEMATOCRIT 42.2 12/04/2018 03:15 PM    MCV 98.4 (H) 12/04/2018 03:15 PM    MCH 31.7 12/04/2018 03:15 PM    MCHC 32.2 (L) 12/04/2018 03:15 PM    MPV 11.4 12/04/2018 03:15 PM    NEUTSPOLYS 74.60 (H) 11/01/2018 11:01 AM    LYMPHOCYTES 16.10 (L) 11/01/2018 11:01 AM    MONOCYTES 8.10 11/01/2018 11:01 AM    EOSINOPHILS 0.60 11/01/2018 11:01 AM    BASOPHILS 0.40 11/01/2018 11:01 AM      Lab Results   Component Value Date/Time    CALCIUM 8.8 12/04/2018 03:15 PM    ASTSGOT 23 11/01/2018 11:01 AM    ALTSGPT 24 11/01/2018 11:01 AM    ALKPHOSPHAT 62 11/01/2018 11:01 AM    TBILIRUBIN 0.7 11/01/2018 11:01 AM    ALBUMIN 4.3 11/01/2018 11:01 AM    TOTPROTEIN 6.8 11/01/2018 11:01 AM      Lab Results   Component Value Date/Time    URICACID 4.5 06/29/2016 02:03 PM    RHEUMFACTN <10 06/29/2016 02:03 PM    CCPANTIBODY 4 06/29/2016 02:03 PM     Lab Results   Component Value Date/Time    SEDRATEWES 11 07/07/2018 11:25 AM     Lab Results   Component Value Date/Time    HBA1C 5.5 05/16/2016 07:50 AM     Lab Results   Component Value Date/Time    CPKTOTAL 61 06/29/2016 02:03 PM     Results for orders placed during the hospital encounter of 03/18/16   DX-JOINT SURVEY-HANDS SINGLE VIEW    Impression Multiple bilateral sites of osteoarthritis     Results for orders placed during the hospital encounter of 03/18/16   DX-JOINT SURVEY-FEET SINGLE VIEW    Impression 1.  Bilateral midfoot and forefoot osteoarthritis    2.  Bilateral bunion    3.  Prior fusion across the right 2nd proximal interphalangeal joint    4.  Foreign body or opaque material between 1st and 2nd phalanges     Results for orders placed during the hospital encounter of 03/23/18   DS-BONE DENSITY STUDY (DEXA)    Impression According to the World Health Organization classification, bone mineral density of this patient is osteopenia with increased risk of fracture. Percentage decrease in bone mineral density in the lumbar region is statistically significant.        10-year Probability of Fracture:  Major Osteoporotic     19.3%  Hip     6.1%  Population      [place] ()    Based on left femur neck BMD          INTERPRETING LOCATION:  [address] [place] [address], [address]     Results for orders placed during the hospital encounter of 01/14/09   DX-KNEE COMPLETE 4+    Impression IMPRESSION:     MILD PATELLOFEMORAL AND MEDIAL FEMOROTIBIAL COMPARTMENT DEGENERATIVE   OSTEOARTHROSIS.        GEK:dana     Read By ALEX WISE MD on Jan 14 2009 10:01AM  : DANA Transcription Date: Jovi 15 2009  8:11AM  THIS DOCUMENT HAS BEEN ELECTRONICALLY SIGNED BY: ALEX WISE MD on   Jan 16 2009  1:32PM        Results for orders placed during the  hospital encounter of 01/14/09   DX-SHOULDER 2+    Impression IMPRESSION:     NORMAL RADIOGRAPHS OF THE LEFT SHOULDER WITH NOTE MADE OF MINIMAL   DEGENERATIVE OSTEOARTHROSIS OF THE GLENOHUMERAL JOINT WITH MINIMAL   SPURRING.           Results for orders placed during the hospital encounter of 08/18/18   MR-LUMBAR SPINE-W/O    Impression Mild interval worsening L3/4 right paracentral protrusion results in mild worsening lateral recess stenosis    Otherwise stable multilevel degenerative change resulting in foraminal predominate stenoses, greatest is moderate to severe on the right at L3/4.    Lesser stenoses at other levels as detailed above    Stable L4/5 grade 1 anterolisthesis of secondary to severe facet arthropathy. Stable minimal degenerative retrolisthesis of the upper lumbar levels    Mature L5/S1 interbody fusion     Results for orders placed during the hospital encounter of 10/08/18   MR-KNEE-W/O LEFT    Impression Status post medial knee hemiarthroplasty without complication identified    Mild patellofemoral and lateral femorotibial osteoarthritis with some cartilage thinning and spurring but no full-thickness defects or areas of marrow edema are identified     DX-CERVICAL SPINE-2 OR 3 VIEWS    Impression IMPRESSION:     DEGENERATIVE DISC AND FACET ARTHROPATHY C5-6, C6-7, AND DEGENERATIVE   FACET ARTHROPATHY AT C7-T1.           Assessment and Plan:     1. Rheumatoid arthritis involving multiple sites with positive rheumatoid factor (HCC)  Clinically stable currently off of any Biologics currently only on Medrol 4 mg p.o. Daily  Recommend patient to stop the Medrol 1 day prior to her surgery, patient can restart Medrol 4 mg p.o. daily once risk of infection at surgical site is resolved and patient able to take oral medications.    2. Adenocarcinoma of colon (HCC)  Scheduled for a sending colon resection December 13, 2018    3. Senile osteoporosis  Last DEXA March 2018 Next DEXA March 2020   continue calcium  1200 mg by mouth daily and vitamin D about 1000 units by mouth daily and magnesium 400 mg by mouth daily    4. Chronic anticoagulation  Secondary to protein S deficiency and status post DVT patient is currently on chronic anticoagulation  This will impact with kind of medications we can use for this patient's arthritis, NSAIDs are contraindicated because of increased risk of bleeding while on chronic anticoagulation    5. Essential hypertension, benign  May impact the type of medications we can use for this patient's arthritis. We will have to keep this under advisement.    Followup: Return in about 6 weeks (around 1/21/2019). or sooner prn to evaluate patient post GI adenocarcinoma resection    Marry Mathur was seen 30 minutes face-to-face of which more than 50% of the time was spent counseling the patient (excluding time for procedures)  regarding  rheumatological condition and care. Therapy was discussed in detail.    Please note that this dictation was created using voice recognition software. I have made every reasonable attempt to correct obvious errors, but I expect that there are errors of grammar and possibly content that I did not discover before finalizing the note.

## 2018-12-10 NOTE — LETTER
[facility]   [address] [place] [address], [address]  [place] [address]  Phone: [phone]  Fax: [phone]              Encounter Date: 12/10/2018    Dear Dr. Eaton ref. provider found,    It was a pleasure seeing your patient, Marry Mathur, on 12/10/2018. There were no encounter diagnoses.     Please find attached progress note which includes the history I obtained from Ms. Mathur, my physical examination findings, my impression and recommendations.      Once again, it was a pleasure participating in your patient's care.  Please feel free to contact me if you have any questions or if I can be of any further assistance to your patients.      Sincerely,    Deanna Escoto M.D.  Electronically Signed          PROGRESS NOTE:  No notes on file

## 2018-12-12 ENCOUNTER — ANTICOAGULATION MONITORING (OUTPATIENT)
Dept: VASCULAR LAB | Facility: MEDICAL CENTER | Age: 83
End: 2018-12-12

## 2018-12-12 NOTE — PROGRESS NOTES
Pt completed labs    Lab Results   Component Value Date/Time    CREATININE 0.70 12/04/2018 03:15 PM      CrCl ~ 51 ml/min    Continue with Xarelto 20mg daily, repeat labs in 6 months.     Jasmyne Macdonald, PerlitaD

## 2018-12-13 ENCOUNTER — HOSPITAL ENCOUNTER (INPATIENT)
Facility: MEDICAL CENTER | Age: 83
LOS: 2 days | DRG: 331 | End: 2018-12-15
Attending: SURGERY | Admitting: SURGERY
Payer: MEDICARE

## 2018-12-13 DIAGNOSIS — G89.18 ACUTE POST-OPERATIVE PAIN: ICD-10-CM

## 2018-12-13 DIAGNOSIS — C18.9 ADENOCARCINOMA OF COLON (HCC): ICD-10-CM

## 2018-12-13 LAB
ABO GROUP BLD: NORMAL
ABO GROUP BLD: NORMAL
BLD GP AB SCN SERPL QL: NORMAL
PATHOLOGY CONSULT NOTE: NORMAL
RH BLD: NORMAL
RH BLD: NORMAL

## 2018-12-13 PROCEDURE — 160002 HCHG RECOVERY MINUTES (STAT): Performed by: SURGERY

## 2018-12-13 PROCEDURE — 88309 TISSUE EXAM BY PATHOLOGIST: CPT

## 2018-12-13 PROCEDURE — 160022 HCHG BLOCK: Performed by: SURGERY

## 2018-12-13 PROCEDURE — 501433 HCHG STAPLER, GIA MULTIFIRE 60/80: Performed by: SURGERY

## 2018-12-13 PROCEDURE — 700101 HCHG RX REV CODE 250

## 2018-12-13 PROCEDURE — 160035 HCHG PACU - 1ST 60 MINS PHASE I: Performed by: SURGERY

## 2018-12-13 PROCEDURE — 160029 HCHG SURGERY MINUTES - 1ST 30 MINS LEVEL 4: Performed by: SURGERY

## 2018-12-13 PROCEDURE — 700102 HCHG RX REV CODE 250 W/ 637 OVERRIDE(OP): Performed by: SURGERY

## 2018-12-13 PROCEDURE — 501445 HCHG STAPLER, SKIN DISP: Performed by: SURGERY

## 2018-12-13 PROCEDURE — 502704 HCHG DEVICE, LIGASURE IMPACT: Performed by: SURGERY

## 2018-12-13 PROCEDURE — 86900 BLOOD TYPING SEROLOGIC ABO: CPT

## 2018-12-13 PROCEDURE — 160041 HCHG SURGERY MINUTES - EA ADDL 1 MIN LEVEL 4: Performed by: SURGERY

## 2018-12-13 PROCEDURE — 160048 HCHG OR STATISTICAL LEVEL 1-5: Performed by: SURGERY

## 2018-12-13 PROCEDURE — 0DTF0ZZ RESECTION OF RIGHT LARGE INTESTINE, OPEN APPROACH: ICD-10-PCS | Performed by: SURGERY

## 2018-12-13 PROCEDURE — 700111 HCHG RX REV CODE 636 W/ 250 OVERRIDE (IP)

## 2018-12-13 PROCEDURE — 86850 RBC ANTIBODY SCREEN: CPT

## 2018-12-13 PROCEDURE — 700102 HCHG RX REV CODE 250 W/ 637 OVERRIDE(OP)

## 2018-12-13 PROCEDURE — 160009 HCHG ANES TIME/MIN: Performed by: SURGERY

## 2018-12-13 PROCEDURE — 160036 HCHG PACU - EA ADDL 30 MINS PHASE I: Performed by: SURGERY

## 2018-12-13 PROCEDURE — 770001 HCHG ROOM/CARE - MED/SURG/GYN PRIV*

## 2018-12-13 PROCEDURE — 501838 HCHG SUTURE GENERAL: Performed by: SURGERY

## 2018-12-13 PROCEDURE — A9270 NON-COVERED ITEM OR SERVICE: HCPCS | Performed by: SURGERY

## 2018-12-13 PROCEDURE — 500424 HCHG DRESSING, AIRSTRIP: Performed by: SURGERY

## 2018-12-13 PROCEDURE — 700111 HCHG RX REV CODE 636 W/ 250 OVERRIDE (IP): Performed by: SURGERY

## 2018-12-13 PROCEDURE — 501452 HCHG STAPLES, GIA MULTIFIRE 60/80: Performed by: SURGERY

## 2018-12-13 PROCEDURE — 700101 HCHG RX REV CODE 250: Performed by: SURGERY

## 2018-12-13 PROCEDURE — A9270 NON-COVERED ITEM OR SERVICE: HCPCS

## 2018-12-13 PROCEDURE — 86901 BLOOD TYPING SEROLOGIC RH(D): CPT

## 2018-12-13 RX ORDER — SCOLOPAMINE TRANSDERMAL SYSTEM 1 MG/1
1 PATCH, EXTENDED RELEASE TRANSDERMAL
Status: DISCONTINUED | OUTPATIENT
Start: 2018-12-13 | End: 2018-12-15 | Stop reason: HOSPADM

## 2018-12-13 RX ORDER — HYDRALAZINE HYDROCHLORIDE 20 MG/ML
5 INJECTION INTRAMUSCULAR; INTRAVENOUS
Status: DISCONTINUED | OUTPATIENT
Start: 2018-12-13 | End: 2018-12-13 | Stop reason: HOSPADM

## 2018-12-13 RX ORDER — OMEPRAZOLE 20 MG/1
20 CAPSULE, DELAYED RELEASE ORAL DAILY
Status: DISCONTINUED | OUTPATIENT
Start: 2018-12-13 | End: 2018-12-15 | Stop reason: HOSPADM

## 2018-12-13 RX ORDER — OXYCODONE HCL 5 MG/5 ML
10 SOLUTION, ORAL ORAL
Status: DISCONTINUED | OUTPATIENT
Start: 2018-12-13 | End: 2018-12-13 | Stop reason: HOSPADM

## 2018-12-13 RX ORDER — HYDROMORPHONE HYDROCHLORIDE 1 MG/ML
0.1 INJECTION, SOLUTION INTRAMUSCULAR; INTRAVENOUS; SUBCUTANEOUS
Status: DISCONTINUED | OUTPATIENT
Start: 2018-12-13 | End: 2018-12-13 | Stop reason: HOSPADM

## 2018-12-13 RX ORDER — DEXAMETHASONE SODIUM PHOSPHATE 4 MG/ML
4 INJECTION, SOLUTION INTRA-ARTICULAR; INTRALESIONAL; INTRAMUSCULAR; INTRAVENOUS; SOFT TISSUE
Status: DISCONTINUED | OUTPATIENT
Start: 2018-12-13 | End: 2018-12-14

## 2018-12-13 RX ORDER — DEXTROSE MONOHYDRATE, SODIUM CHLORIDE, AND POTASSIUM CHLORIDE 50; 1.49; 4.5 G/1000ML; G/1000ML; G/1000ML
INJECTION, SOLUTION INTRAVENOUS CONTINUOUS
Status: DISCONTINUED | OUTPATIENT
Start: 2018-12-13 | End: 2018-12-15 | Stop reason: HOSPADM

## 2018-12-13 RX ORDER — HYDROMORPHONE HYDROCHLORIDE 1 MG/ML
0.2 INJECTION, SOLUTION INTRAMUSCULAR; INTRAVENOUS; SUBCUTANEOUS
Status: DISCONTINUED | OUTPATIENT
Start: 2018-12-13 | End: 2018-12-13 | Stop reason: HOSPADM

## 2018-12-13 RX ORDER — SODIUM CHLORIDE, SODIUM LACTATE, POTASSIUM CHLORIDE, CALCIUM CHLORIDE 600; 310; 30; 20 MG/100ML; MG/100ML; MG/100ML; MG/100ML
INJECTION, SOLUTION INTRAVENOUS ONCE
Status: COMPLETED | OUTPATIENT
Start: 2018-12-13 | End: 2018-12-13

## 2018-12-13 RX ORDER — ONDANSETRON 2 MG/ML
4 INJECTION INTRAMUSCULAR; INTRAVENOUS
Status: DISCONTINUED | OUTPATIENT
Start: 2018-12-13 | End: 2018-12-13 | Stop reason: HOSPADM

## 2018-12-13 RX ORDER — ACETAMINOPHEN 500 MG
1000 TABLET ORAL EVERY 6 HOURS
Status: DISCONTINUED | OUTPATIENT
Start: 2018-12-13 | End: 2018-12-15 | Stop reason: HOSPADM

## 2018-12-13 RX ORDER — SODIUM CHLORIDE, SODIUM LACTATE, POTASSIUM CHLORIDE, CALCIUM CHLORIDE 600; 310; 30; 20 MG/100ML; MG/100ML; MG/100ML; MG/100ML
INJECTION, SOLUTION INTRAVENOUS CONTINUOUS
Status: DISCONTINUED | OUTPATIENT
Start: 2018-12-13 | End: 2018-12-13 | Stop reason: HOSPADM

## 2018-12-13 RX ORDER — METOPROLOL TARTRATE 1 MG/ML
1 INJECTION, SOLUTION INTRAVENOUS
Status: DISCONTINUED | OUTPATIENT
Start: 2018-12-13 | End: 2018-12-13 | Stop reason: HOSPADM

## 2018-12-13 RX ORDER — ACETAMINOPHEN 500 MG
TABLET ORAL
Status: DISPENSED
Start: 2018-12-13 | End: 2018-12-13

## 2018-12-13 RX ORDER — METHYLPREDNISOLONE 4 MG/1
4 TABLET ORAL DAILY
Status: DISCONTINUED | OUTPATIENT
Start: 2018-12-13 | End: 2018-12-15 | Stop reason: HOSPADM

## 2018-12-13 RX ORDER — GABAPENTIN 300 MG/1
CAPSULE ORAL
Status: DISPENSED
Start: 2018-12-13 | End: 2018-12-13

## 2018-12-13 RX ORDER — OXYCODONE HCL 5 MG/5 ML
5 SOLUTION, ORAL ORAL
Status: DISCONTINUED | OUTPATIENT
Start: 2018-12-13 | End: 2018-12-13 | Stop reason: HOSPADM

## 2018-12-13 RX ORDER — ONDANSETRON 2 MG/ML
4 INJECTION INTRAMUSCULAR; INTRAVENOUS EVERY 4 HOURS PRN
Status: DISCONTINUED | OUTPATIENT
Start: 2018-12-13 | End: 2018-12-15 | Stop reason: HOSPADM

## 2018-12-13 RX ORDER — MEPERIDINE HYDROCHLORIDE 25 MG/ML
6.25 INJECTION INTRAMUSCULAR; INTRAVENOUS; SUBCUTANEOUS
Status: DISCONTINUED | OUTPATIENT
Start: 2018-12-13 | End: 2018-12-13 | Stop reason: HOSPADM

## 2018-12-13 RX ORDER — PREGABALIN 75 MG/1
75 CAPSULE ORAL DAILY
Status: DISCONTINUED | OUTPATIENT
Start: 2018-12-13 | End: 2018-12-13

## 2018-12-13 RX ORDER — HALOPERIDOL 5 MG/ML
1 INJECTION INTRAMUSCULAR
Status: DISCONTINUED | OUTPATIENT
Start: 2018-12-13 | End: 2018-12-13 | Stop reason: HOSPADM

## 2018-12-13 RX ORDER — PREGABALIN 75 MG/1
75 CAPSULE ORAL 2 TIMES DAILY
Status: DISCONTINUED | OUTPATIENT
Start: 2018-12-13 | End: 2018-12-15 | Stop reason: HOSPADM

## 2018-12-13 RX ORDER — HALOPERIDOL 5 MG/ML
1 INJECTION INTRAMUSCULAR EVERY 6 HOURS PRN
Status: DISCONTINUED | OUTPATIENT
Start: 2018-12-13 | End: 2018-12-15 | Stop reason: HOSPADM

## 2018-12-13 RX ORDER — HYDROMORPHONE HYDROCHLORIDE 1 MG/ML
0.4 INJECTION, SOLUTION INTRAMUSCULAR; INTRAVENOUS; SUBCUTANEOUS
Status: DISCONTINUED | OUTPATIENT
Start: 2018-12-13 | End: 2018-12-13 | Stop reason: HOSPADM

## 2018-12-13 RX ADMIN — MORPHINE SULFATE: 50 INJECTION, SOLUTION, CONCENTRATE INTRAVENOUS at 10:16

## 2018-12-13 RX ADMIN — ACETAMINOPHEN 1000 MG: 500 TABLET ORAL at 17:45

## 2018-12-13 RX ADMIN — ACETAMINOPHEN 1000 MG: 500 TABLET ORAL at 13:22

## 2018-12-13 RX ADMIN — LIDOCAINE HYDROCHLORIDE 0.5 ML: 10 INJECTION, SOLUTION EPIDURAL; INFILTRATION; INTRACAUDAL; PERINEURAL at 06:39

## 2018-12-13 RX ADMIN — PREGABALIN 75 MG: 75 CAPSULE ORAL at 17:46

## 2018-12-13 RX ADMIN — OMEPRAZOLE 20 MG: 20 CAPSULE, DELAYED RELEASE ORAL at 13:22

## 2018-12-13 RX ADMIN — SODIUM CHLORIDE, SODIUM LACTATE, POTASSIUM CHLORIDE, CALCIUM CHLORIDE: 600; 310; 30; 20 INJECTION, SOLUTION INTRAVENOUS at 06:39

## 2018-12-13 RX ADMIN — POTASSIUM CHLORIDE, DEXTROSE MONOHYDRATE AND SODIUM CHLORIDE: 150; 5; 450 INJECTION, SOLUTION INTRAVENOUS at 13:21

## 2018-12-13 ASSESSMENT — PAIN SCALES - GENERAL
PAINLEVEL_OUTOF10: 3
PAINLEVEL_OUTOF10: 0
PAINLEVEL_OUTOF10: 2
PAINLEVEL_OUTOF10: 3
PAINLEVEL_OUTOF10: 3
PAINLEVEL_OUTOF10: 2
PAINLEVEL_OUTOF10: 3
PAINLEVEL_OUTOF10: 2

## 2018-12-13 ASSESSMENT — COGNITIVE AND FUNCTIONAL STATUS - GENERAL
DAILY ACTIVITIY SCORE: 23
MOVING TO AND FROM BED TO CHAIR: A LITTLE
SUGGESTED CMS G CODE MODIFIER DAILY ACTIVITY: CI
CLIMB 3 TO 5 STEPS WITH RAILING: A LITTLE
MOBILITY SCORE: 20
SUGGESTED CMS G CODE MODIFIER MOBILITY: CJ
WALKING IN HOSPITAL ROOM: A LITTLE
STANDING UP FROM CHAIR USING ARMS: A LITTLE
DRESSING REGULAR LOWER BODY CLOTHING: A LITTLE

## 2018-12-13 ASSESSMENT — PATIENT HEALTH QUESTIONNAIRE - PHQ9
1. LITTLE INTEREST OR PLEASURE IN DOING THINGS: NOT AT ALL
SUM OF ALL RESPONSES TO PHQ9 QUESTIONS 1 AND 2: 0
2. FEELING DOWN, DEPRESSED, IRRITABLE, OR HOPELESS: NOT AT ALL

## 2018-12-13 ASSESSMENT — LIFESTYLE VARIABLES
EVER_SMOKED: NEVER
ALCOHOL_USE: NO

## 2018-12-13 ASSESSMENT — COPD QUESTIONNAIRES
DO YOU EVER COUGH UP ANY MUCUS OR PHLEGM?: NO/ONLY WITH OCCASIONAL COLDS OR INFECTIONS
HAVE YOU SMOKED AT LEAST 100 CIGARETTES IN YOUR ENTIRE LIFE: NO/DON'T KNOW
IN THE PAST 12 MONTHS DO YOU DO LESS THAN YOU USED TO BECAUSE OF YOUR BREATHING PROBLEMS: DISAGREE/UNSURE
DURING THE PAST 4 WEEKS HOW MUCH DID YOU FEEL SHORT OF BREATH: NONE/LITTLE OF THE TIME
COPD SCREENING SCORE: 2

## 2018-12-13 NOTE — OR SURGEON
Immediate Post OP Note    PreOp Diagnosis: Ascending colon invasive adenocarcinoma    PostOp Diagnosis: Ascending colon invasive adenocarcinoma    Procedure(s):  OPEN RIGHT HEMICOLECTOMY - Wound Class: Clean Contaminated    Surgeon(s):  DIONISIO Paredes M.D.    Anesthesiologist/Type of Anesthesia:  Anesthesiologist: Agustin Currie M.D./General    Surgical Staff:  Circulator: Samreen Rascon R.N.  Scrub Person: Bal Mixon    Specimens removed if any:  ID Type Source Tests Collected by Time Destination   A : RIGHT COLON history of right colon cancer Tissue Colon PATHOLOGY SPECIMEN Dash Bhagat M.D. 12/13/2018  8:34 AM        Estimated Blood Loss: 100 ml    Findings: Tattooed right colon consistent with report of colonoscopy    Complications: None        12/13/2018 9:04 AM Dash Bhagat M.D.

## 2018-12-13 NOTE — OR NURSING
0904: received to PACU via gurney. Sleepy. Respirations spontaneous and unlabored. Abdomen soft. Midline abdominal dressing CDI. Ice place over the operative site.  0925: patient place on 6L Oxymask. Denies any pain.discussed with Dr Howard 6 L / Oxymask for 6 hours.  1030: pain scale 3/10. Patient instructed on how to use the Morphine PCA.  1108: report called to Therese RN  1126: transported via gurney to [place] [address] by transport with O2 at 6 L / Oxymask. Stable.

## 2018-12-13 NOTE — OP REPORT
DATE OF SERVICE:  12/13/2018    PREOPERATIVE DIAGNOSIS:  Right colon cancer.    POSTOPERATIVE DIAGNOSIS:  Right colon cancer.    INDICATION FOR SURGERY:  This is an 83-year-old female who underwent a   screening colonoscopy on 10/19/2018 performed by Dr. Allen Sherman.  At that   time, she was noted to have polyps within the ascending colon, which by   pathology showed moderately differentiated invasive carcinoma.  She was seen   in consultation and taken to the operating room today for an open right   hemicolectomy.    PROCEDURE:  Open right hemicolectomy.    SURGEON:  Dash Bhagat MD    ASSISTANT:  Dustin Madsen MD    ANESTHESIOLOGIST:  Agustin Currie MD    ANESTHESIA:  General endotracheal anesthesia.    FINDINGS:  Tattooed right colon consistent with report from the colonoscopy.    PROCEDURE NARRATIVE:  Signed informed consent was on the chart at the time of   the procedure.  Patient was brought to the operating room and placed in the   supine position.  General endotracheal anesthesia was induced.  A Gunderson   catheter was placed sterilely.  The patient was administered 2 g of cefotetan   IV preoperatively.  A time-out was performed.  The skin over the abdomen was   prepped and draped in a sterile fashion.  Using a 10 blade scalpel, a 15 cm   incision was made in the midline superior to the umbilicus.  This was later   extended to below the umbilicus for access to the cecum.  The underlying soft   tissue was dissected through using careful Bovie cautery until the midline   fascia was reached.  The midline fascia was divided using the Bovie cautery   for the length of the skin incision.  The underlying soft tissue was dissected   bluntly and the peritoneum was grasped on 2 graspers and then lifted and   divided using the Metzenbaum scissors.  The length of the skin incision was   then opened by dividing the peritoneum over surgeon's fingers.  The small   bowel was run, no evidence of metastatic disease  was appreciated.  No evidence   of metastatic disease was appreciated on palpation of the liver and stomach.    The omentum was carefully dissected off of the proximal transverse colon.    The white line of Toldt was taken down using sharp dissection and occasional   use of the Bovie cautery and the adhesions to the cecum and appendix were   taken down as well.  This was what required extension of the skin incision   below the umbilicus.  The transverse colon was lifted and the middle colic   artery and vein were localized.  A site just proximal to these vessels was   chosen on the proximal transverse colon as the site for division and a defect   was made in the mesentery to the colon at this site.  The colon was divided   there using a ABRIL stapler.  The mesentery to the colon was then taken down to   its root and then proximally along to the cecum using the LigaSure impact   device.  A site approximately 10 cm proximal to the ileocolic junction was   chosen as the division site on the ileum and a window was made in the   mesentery at this site.  The ileum was divided again using the ABRIL stapler and   again the mesentery to this portion of the bowel was taken down to its root   and then distally using the LigaSure impact device.  The specimen was removed   and sent to pathology.  The small bowel was again run and once we were sure   that the bowel had no twist, the 2 divided ends of the bowel were brought   together and secured to each other using 2 separately placed 2-0 Vicryl   sutures.  The small bowel had a small right angle bowel clamp placed   proximally and the colon had a curved bowel clamp placed distally.  The ends   of the bowel were excluded from the peritoneal contents with lap pads.    Enterotomies were made at the ends of each of the stapled bowel and a ABRIL   stapler was used to make a stapled anastomosis at that site.  The resulting   defect was grasped with Allis clamps, brought up and then closed  with a ABRIL   stapler.  Some very small bleeding was noted at the staple line, this was   dressed with a 3-0 Vicryl suture and then the staple line was imbricated with   a second row of sutures, which were 3-0 Vicryl sutures.  The suture line was   further reinforced with a tongue of omentum, which was sutured over the staple   line at 2 positions making sure that there was no room for an internal   hernia.  The peritoneal cavity was irrigated and dried.  Hemostasis was noted   to be excellent.  The lap, needle, and instrument counts were noted to be   correct.  The fascia was closed with a series of #1 Vicryl interrupted   sutures.  The subcutaneous tissue was irrigated and dried.  Milagros's fascia   was reapproximated with a series of 2-0 Vicryl interrupted sutures and the   skin was closed with skin staples.  The midline incision was dressed with a   nylon dressing.  The catheter was removed.    FLUIDS:  1000 mL crystalloid.    ESTIMATED BLOOD LOSS:  100 mL.    DRAINS:  None.    URINE OUTPUT:  450 mL.    COMPLICATIONS:  None.    SPECIMENS:  Right colon to pathology.    DISPOSITION:  Patient was in stable condition to postanesthesia care unit.       ____________________________________     MD FRANKIE Johnson / JENNIFER    DD:  12/13/2018 09:28:07  DT:  12/13/2018 09:45:06    D#:  [id number]  Job#:  [id number]

## 2018-12-14 LAB
ANION GAP SERPL CALC-SCNC: 4 MMOL/L (ref 0–11.9)
BUN SERPL-MCNC: 11 MG/DL (ref 8–22)
CALCIUM SERPL-MCNC: 8.4 MG/DL (ref 8.5–10.5)
CHLORIDE SERPL-SCNC: 106 MMOL/L (ref 96–112)
CO2 SERPL-SCNC: 27 MMOL/L (ref 20–33)
CREAT SERPL-MCNC: 0.63 MG/DL (ref 0.5–1.4)
ERYTHROCYTE [DISTWIDTH] IN BLOOD BY AUTOMATED COUNT: 47.4 FL (ref 35.9–50)
GLUCOSE SERPL-MCNC: 148 MG/DL (ref 65–99)
HCT VFR BLD AUTO: 39.2 % (ref 37–47)
HGB BLD-MCNC: 13.2 G/DL (ref 12–16)
MCH RBC QN AUTO: 32.7 PG (ref 27–33)
MCHC RBC AUTO-ENTMCNC: 33.7 G/DL (ref 33.6–35)
MCV RBC AUTO: 97 FL (ref 81.4–97.8)
PLATELET # BLD AUTO: 185 K/UL (ref 164–446)
PMV BLD AUTO: 11.8 FL (ref 9–12.9)
POTASSIUM SERPL-SCNC: 4.2 MMOL/L (ref 3.6–5.5)
RBC # BLD AUTO: 4.04 M/UL (ref 4.2–5.4)
SODIUM SERPL-SCNC: 137 MMOL/L (ref 135–145)
WBC # BLD AUTO: 12.1 K/UL (ref 4.8–10.8)

## 2018-12-14 PROCEDURE — 80048 BASIC METABOLIC PNL TOTAL CA: CPT

## 2018-12-14 PROCEDURE — 700101 HCHG RX REV CODE 250: Performed by: SURGERY

## 2018-12-14 PROCEDURE — 36415 COLL VENOUS BLD VENIPUNCTURE: CPT

## 2018-12-14 PROCEDURE — A9270 NON-COVERED ITEM OR SERVICE: HCPCS | Performed by: SURGERY

## 2018-12-14 PROCEDURE — 700111 HCHG RX REV CODE 636 W/ 250 OVERRIDE (IP): Performed by: SURGERY

## 2018-12-14 PROCEDURE — 85027 COMPLETE CBC AUTOMATED: CPT

## 2018-12-14 PROCEDURE — 700102 HCHG RX REV CODE 250 W/ 637 OVERRIDE(OP): Performed by: SURGERY

## 2018-12-14 PROCEDURE — 770001 HCHG ROOM/CARE - MED/SURG/GYN PRIV*

## 2018-12-14 RX ORDER — OXYCODONE HYDROCHLORIDE 5 MG/1
5 TABLET ORAL EVERY 4 HOURS PRN
Status: DISCONTINUED | OUTPATIENT
Start: 2018-12-14 | End: 2018-12-15 | Stop reason: HOSPADM

## 2018-12-14 RX ORDER — OXYCODONE HYDROCHLORIDE 10 MG/1
10 TABLET ORAL EVERY 4 HOURS PRN
Status: DISCONTINUED | OUTPATIENT
Start: 2018-12-14 | End: 2018-12-15 | Stop reason: HOSPADM

## 2018-12-14 RX ORDER — MORPHINE SULFATE 10 MG/ML
2 INJECTION, SOLUTION INTRAMUSCULAR; INTRAVENOUS
Status: DISCONTINUED | OUTPATIENT
Start: 2018-12-14 | End: 2018-12-15 | Stop reason: HOSPADM

## 2018-12-14 RX ADMIN — POTASSIUM CHLORIDE, DEXTROSE MONOHYDRATE AND SODIUM CHLORIDE: 150; 5; 450 INJECTION, SOLUTION INTRAVENOUS at 00:41

## 2018-12-14 RX ADMIN — PREGABALIN 75 MG: 75 CAPSULE ORAL at 17:24

## 2018-12-14 RX ADMIN — POTASSIUM CHLORIDE, DEXTROSE MONOHYDRATE AND SODIUM CHLORIDE: 150; 5; 450 INJECTION, SOLUTION INTRAVENOUS at 16:14

## 2018-12-14 RX ADMIN — PREGABALIN 75 MG: 75 CAPSULE ORAL at 05:21

## 2018-12-14 RX ADMIN — METHYLPREDNISOLONE 4 MG: 4 TABLET ORAL at 17:23

## 2018-12-14 RX ADMIN — ACETAMINOPHEN 1000 MG: 500 TABLET ORAL at 17:24

## 2018-12-14 RX ADMIN — ACETAMINOPHEN 1000 MG: 500 TABLET ORAL at 11:57

## 2018-12-14 RX ADMIN — ENOXAPARIN SODIUM 30 MG: 100 INJECTION SUBCUTANEOUS at 10:11

## 2018-12-14 RX ADMIN — OMEPRAZOLE 20 MG: 20 CAPSULE, DELAYED RELEASE ORAL at 05:22

## 2018-12-14 RX ADMIN — ACETAMINOPHEN 1000 MG: 500 TABLET ORAL at 05:21

## 2018-12-14 RX ADMIN — RIVAROXABAN 20 MG: 20 TABLET, FILM COATED ORAL at 17:23

## 2018-12-14 RX ADMIN — ACETAMINOPHEN 1000 MG: 500 TABLET ORAL at 23:53

## 2018-12-14 ASSESSMENT — PAIN SCALES - GENERAL
PAINLEVEL_OUTOF10: 2
PAINLEVEL_OUTOF10: 3
PAINLEVEL_OUTOF10: 2
PAINLEVEL_OUTOF10: 0
PAINLEVEL_OUTOF10: 2

## 2018-12-14 ASSESSMENT — ENCOUNTER SYMPTOMS
NAUSEA: 0
FEVER: 0
CHILLS: 0
ABDOMINAL PAIN: 1
VOMITING: 0
WEAKNESS: 0

## 2018-12-14 NOTE — PROGRESS NOTES
Surgical Progress Note    Author: Dash Bhagat Date & Time created: 2018   10:04 AM     Interval Events:  Pain well controlled.  Using PCA sparingly.  2 BMs recorded, small liquid.  Patient states she is not having flatus, and first BM was uncontrolled and in bed last night.  Out of bed this morning.  Tolerating full liquid diet.    Review of Systems   Constitutional: Negative for chills, fever and malaise/fatigue.   Cardiovascular: Negative for chest pain.   Gastrointestinal: Positive for abdominal pain. Negative for nausea and vomiting.   Neurological: Negative for weakness.     Hemodynamics:  Temp (24hrs), Av.6 °C (97.9 °F), Min:36.4 °C (97.5 °F), Max:36.9 °C (98.4 °F)  Temperature: 36.6 °C (97.8 °F)  Pulse  Av.4  Min: 64  Max: 92Heart Rate (Monitored): 80  Blood Pressure : 144/70, NIBP: 126/62     Respiratory:    Respiration: 16, Pulse Oximetry: 94 %           Neuro:  GCS = 15       Fluids:    Intake/Output Summary (Last 24 hours) at 18 1004  Last data filed at 18 0800   Gross per 24 hour   Intake             2575 ml   Output              400 ml   Net             2175 ml        Current Diet Order   Procedures   • Diet Order Full Liquid     Physical Exam   Constitutional: She is oriented to person, place, and time. She appears well-developed and well-nourished. No distress.   HENT:   Head: Normocephalic.   Eyes: Pupils are equal, round, and reactive to light. No scleral icterus.   Cardiovascular: Normal rate, regular rhythm and normal heart sounds.    Pulmonary/Chest: Effort normal and breath sounds normal. No respiratory distress.   Abdominal: Soft. She exhibits distension. There is tenderness. There is no rebound and no guarding.   Bowel sounds absent.  Dressing with small amount of dried blood on bandage.   Neurological: She is alert and oriented to person, place, and time.   Skin: Skin is warm and dry.   Psychiatric: She has a normal mood and affect. Her behavior is normal.      Labs:  Recent Results (from the past 24 hour(s))   HISTOLOGY REQUEST    Collection Time: 12/13/18 10:05 AM   Result Value Ref Range    Pathology Request Sent to Histo    CBC without Differential    Collection Time: 12/14/18  3:53 AM   Result Value Ref Range    WBC 12.1 (H) 4.8 - 10.8 K/uL    RBC 4.04 (L) 4.20 - 5.40 M/uL    Hemoglobin 13.2 12.0 - 16.0 g/dL    Hematocrit 39.2 37.0 - 47.0 %    MCV 97.0 81.4 - 97.8 fL    MCH 32.7 27.0 - 33.0 pg    MCHC 33.7 33.6 - 35.0 g/dL    RDW 47.4 35.9 - 50.0 fL    Platelet Count 185 164 - 446 K/uL    MPV 11.8 9.0 - 12.9 fL   Basic Metabolic Panel (BPM)    Collection Time: 12/14/18  3:53 AM   Result Value Ref Range    Sodium 137 135 - 145 mmol/L    Potassium 4.2 3.6 - 5.5 mmol/L    Chloride 106 96 - 112 mmol/L    Co2 27 20 - 33 mmol/L    Glucose 148 (H) 65 - 99 mg/dL    Bun 11 8 - 22 mg/dL    Creatinine 0.63 0.50 - 1.40 mg/dL    Calcium 8.4 (L) 8.5 - 10.5 mg/dL    Anion Gap 4.0 0.0 - 11.9   ESTIMATED GFR    Collection Time: 12/14/18  3:53 AM   Result Value Ref Range    GFR If African American >60 >60 mL/min/1.73 m 2    GFR If Non African American >60 >60 mL/min/1.73 m 2     Medical Decision Making, by Problem:  Colon Cancer - POD #1, right hemicolectomy  Rheumatoid arthritis  GERD  History of recurrent DVT  Hyperlipidemia    Plan:  Restart  Xarelto today.  Stop Lovenox.  Decrease IVF rate to 50 ml per hour, hep lock tomorrow if continues to tolerate full liquid diet.  Ambulate.  Continue PCA today.  ADAT, discharge when ready.    Quality Measures:  Quality-Core Measures   Reviewed items::  Labs reviewed and Medications reviewed  Gunderson catheter::  No Gunderson  DVT prophylaxis pharmacological::  Enoxaparin (Lovenox)  DVT prophylaxis - mechanical:  SCDs  Ulcer Prophylaxis::  Yes      Discussed patient condition with Patient

## 2018-12-14 NOTE — CARE PLAN
Problem: Safety  Goal: Will remain free from injury  Outcome: PROGRESSING AS EXPECTED  Patient educated on the importance of calling a staff member before getting out of bed. Patient verbalizes understanding and patient calling appropriately. Bed in lowest position, call light and other belongings within reach, treaded socks on, and hourly rounding in place. Close to nursing station.     Problem: Pain Management  Goal: Pain level will decrease to patient's comfort goal  Outcome: PROGRESSING AS EXPECTED  Morphine PCA in place. Pt educated on the 1-10 pain scale. Pt verbalizes understanding. Pt states that current drug regimen is effectively controlling pain.

## 2018-12-14 NOTE — PROGRESS NOTES
Report received.  Assumed Care.  Pt in bed, 438. AOx4, responds appropriately.  Denies pain, SOB.  Plan of care discussed.  Explained importance of calling before getting OOB and pt verbalizes understanding.  Call light and belongings within reach, treaded slipper socks on, bed alarm in use, bed in lowest position.  Will monitor frequently.

## 2018-12-14 NOTE — PROGRESS NOTES
PACU report received.  Assessment complete.  A&O x 4. Patient calls appropriately.  Patient up with stand by assist and FWW.   Patient has 3-4/10 pain. Morphine PCA in place  Denies N&V. Tolerating full liquid diet.  Surgical midline to abdomen with gauze and tape dressing has old drainage, marked with sharpie, otherwise is CDI.  + void, - flatus  Patient denies SOB.  SCD's on.  Patient in pleasant mood, ambulated x1, voided x2.  Review plan with of care with patient. Call light and personal belongings with in reach. Hourly rounding in place. All needs met at this time.    2 RN skin check:  Midline to abdomen with gauze dressing  R bottom of foot has Bunyan  Bilateral heels dry, but blanching  Backside is intact  No other breakdown noted

## 2018-12-14 NOTE — PROGRESS NOTES
Report received. POC discussed. Assumed care of pt.  Call light in reach. Hourly rounding in progress.  AxOx4. Calls appropriately.  Pt gets up SBA with cane Pt diet Full liquids LBM 12/14  Midline incision, dressing in place. Shadowing present.   R. Foot bunion noted.  Pt O2 sat 95 on 2L nasal cannula, weaning in place   Pain 2/10  +void +flatus  Morphine PCA in place. Rate verified with NOC RN.   SCDs on and connected.   All needs met at this time.

## 2018-12-14 NOTE — CARE PLAN
Problem: Knowledge Deficit  Goal: Knowledge of disease process/condition, treatment plan, diagnostic tests, and medications will improve  Outcome: PROGRESSING AS EXPECTED    Intervention: Explain information regarding disease process/condition, treatment plan, diagnostic tests, and medications and document in education  Patient updated on plan of care, asked questions, verbalized understanding.       Problem: Pain Management  Goal: Pain level will decrease to patient's comfort goal  Outcome: PROGRESSING AS EXPECTED    Intervention: Follow pain managment plan developed in collaboration with patient and Interdisciplinary Team  Patient pain controlled with PCA. No basal dose, only bolus. This has been controlling the patients pain adequately and it has stayed around a 3-4/10

## 2018-12-15 VITALS
RESPIRATION RATE: 15 BRPM | DIASTOLIC BLOOD PRESSURE: 83 MMHG | HEART RATE: 65 BPM | SYSTOLIC BLOOD PRESSURE: 136 MMHG | BODY MASS INDEX: 28.98 KG/M2 | OXYGEN SATURATION: 95 % | WEIGHT: 163.58 LBS | TEMPERATURE: 96.9 F | HEIGHT: 63 IN

## 2018-12-15 PROCEDURE — A9270 NON-COVERED ITEM OR SERVICE: HCPCS | Performed by: SURGERY

## 2018-12-15 PROCEDURE — 700102 HCHG RX REV CODE 250 W/ 637 OVERRIDE(OP): Performed by: SURGERY

## 2018-12-15 RX ORDER — HYDROCODONE BITARTRATE AND ACETAMINOPHEN 5; 325 MG/1; MG/1
1-2 TABLET ORAL EVERY 4 HOURS PRN
Qty: 18 TAB | Refills: 0 | Status: SHIPPED | OUTPATIENT
Start: 2018-12-15 | End: 2018-12-18

## 2018-12-15 RX ORDER — ONDANSETRON 4 MG/1
4 TABLET, ORALLY DISINTEGRATING ORAL EVERY 6 HOURS PRN
Qty: 12 TAB | Refills: 0 | Status: SHIPPED | OUTPATIENT
Start: 2018-12-15 | End: 2019-12-02

## 2018-12-15 RX ADMIN — OMEPRAZOLE 20 MG: 20 CAPSULE, DELAYED RELEASE ORAL at 06:17

## 2018-12-15 RX ADMIN — PREGABALIN 75 MG: 75 CAPSULE ORAL at 06:17

## 2018-12-15 RX ADMIN — ACETAMINOPHEN 1000 MG: 500 TABLET ORAL at 06:17

## 2018-12-15 RX ADMIN — ACETAMINOPHEN 1000 MG: 500 TABLET ORAL at 11:40

## 2018-12-15 ASSESSMENT — PAIN SCALES - GENERAL
PAINLEVEL_OUTOF10: 0

## 2018-12-15 ASSESSMENT — ENCOUNTER SYMPTOMS
VOMITING: 0
FEVER: 0
NAUSEA: 0
CHILLS: 0
ABDOMINAL PAIN: 1

## 2018-12-15 NOTE — DISCHARGE SUMMARY
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    PROCEDURE:  On 12/13/2018, the patient had an open right hemicolectomy with   primary reanastomosis.    HISTORY OF PRESENT ILLNESS:  This is a healthy 83-year-old woman who underwent   screening colonoscopy on 10/19/2018 performed by Dr. Sherman.  She was noted   to have ascending colonic polyps, which showed moderately differentiated   invasive carcinoma on pathology.  She was referred to Dr. Bhagat and he   proceeded with a right hemicolectomy on 12/13/2018.  The patient was placed on   ERAS protocol and on postop day #1 was tolerating a full liquid diet and   using minimal narcotics.  She passed some stool on postop day #1 and then by   the evening on postop day #1 was also passing gas.  She advanced to a regular   diet without difficulty.  She was ambulating independently and urinating   without a catheter.  She was thus deemed appropriate for discharge to home on   postop day #2.    DISCHARGE MEDICATIONS:  Hydrocodone 5/325 one tab by mouth every 4 hours as   needed for pain, #18 and Zofran 4 mg by mouth every 6 hours as needed for   nausea and vomiting, #12.    FOLLOWUP:  She is to follow up in 2 weeks with Dr. Bhagat for staple   removal.    DISCHARGE INSTRUCTIONS:  She may shower.  She should not use bath pools or go   swimming for 2 weeks.  If she has increasing pain, fevers, chills, nausea,   vomiting, shortness of breath or chest pain, she is to call our office or go   to the emergency department right away.       ____________________________________     MD ANITA Stearns / JENNIFER    DD:  12/15/2018 11:05:49  DT:  12/15/2018 14:50:54    D#:  [id number]  Job#:  [id number]

## 2018-12-15 NOTE — CARE PLAN
Problem: Safety  Goal: Will remain free from falls  Outcome: PROGRESSING AS EXPECTED  Bed locked and in lowest position, side rails up x 2, call light within reach, no slip footwear in place      Problem: Pain Management  Goal: Pain level will decrease to patient's comfort goal  Outcome: PROGRESSING AS EXPECTED  Assess pain Q 4 hours, administer pain medication if indicated, encourage patient to voice pain

## 2018-12-15 NOTE — DISCHARGE INSTRUCTIONS
Open Colectomy, Care After  Refer to this sheet in the next few weeks. These instructions provide you with information on caring for yourself after your procedure. Your health care provider may also give you more specific instructions. Your treatment has been planned according to current medical practices, but problems sometimes occur. Call your health care provider if you have any problems or questions after your procedure.  WHAT TO EXPECT AFTER THE PROCEDURE  After your procedure, it is typical to have the following:  · Pain in your abdomen, especially along your incision. You will be given medicines to control the pain.  · Tiredness. This is a normal part of the recovery process. Your energy level will return to normal over the next several weeks.  · Constipation. You may be given a stool softener to prevent this.  HOME CARE INSTRUCTIONS  · Only take over-the-counter or prescription medicines as directed by your health care provider.  · Ask your health care provider whether you may take a shower when you go home.  · You may resume a normal diet and activities as directed. Eat plenty of fruits and vegetables to help prevent constipation.  · Drink enough fluids to keep your urine clear or pale yellow. This also helps prevent constipation.  · Take rest breaks during the day as needed.  · Avoid lifting anything heavier than 25 pounds (11.3 kg) or driving for 4 weeks or until your health care provider says it is okay.  · Follow up with your health care provider as directed. Ask your health care provider when you need to return to have your stitches or staples removed.  SEEK MEDICAL CARE IF:  · You have redness, swelling, or increasing pain in the incision area.  · You see pus coming from the incision area.  · You have a fever.  SEEK IMMEDIATE MEDICAL CARE IF:   · You have chest pain or shortness of breath.  · You have pain or swelling in your legs.  · You have persistent nausea and vomiting.  · Your wound breaks open  after stitches or staples have been removed.  · You have increasing abdominal pain that is not relieved with medicine.     This information is not intended to replace advice given to you by your health care provider. Make sure you discuss any questions you have with your health care provider.     Document Released: 07/10/2012 Document Revised: 10/08/2014 Document Reviewed: 07/30/2014  [employer] Interactive Patient Education ©2016 [employer]    1. DIET: Upon discharge from the hospital you may resume your normal preoperative diet. Depending on how you are feeling and whether you have nausea or not, you may wish to stay with a bland diet for the first few days. However, you can advance this as quickly as you feel ready.     2. ACTIVITIES: After discharge from the hospital, you may resume full routine activities. However, there should be no heavy lifting (greater than 15 pounds) and no strenuous activities until after your follow-up visit. Otherwise, routine activities of daily living are acceptable.     3. DRIVING: You may drive whenever you are off pain medications and are able to perform the activities needed to drive, i.e. turning, bending, twisting, etc.     4. BATHING: You may get the wound wet at any time after leaving the hospital. You may shower, but do not submerge in a bath for at least a week. Dressings may come off after 48 hours.     5. BOWEL FUNCTION: Constipation is common after an operation, especially with pain medications. The combination of pain medication and decreased activity level can cause constipation in otherwise normal patients. If you feel this is occurring, take a laxative (Milk of Magnesia, Ex-Lax, Senokot, etc.) until the problem has resolved.     6. PAIN MEDICATION: You will be given a prescription for pain medication at discharge. Please take these as directed. It is important to remember not to take medications on an empty stomach as this may cause nausea.     7.CALL IF YOU HAVE:  (1) Fevers to more than 1010 F, (2) Unusual chest or leg pain, (3) Drainage or fluid from incision that may be foul smelling, increased tenderness or soreness at the wound or the wound edges are no longer together, redness or swelling at the incision site. Please do not hesitate to call with any other questions.     8. APPOINTMENT: Contact our office at [phone] for a follow-up appointment in 2 weeks following your procedure.     If you have any additional questions, please do not hesitate to call the office and speak to either myself or the physician on call.     Office address:   [address]     Elicia Flores MD   [facility]   [phone]    Discharge Instructions    Discharged to home by car with relative. Discharged via wheelchair, hospital escort: Yes.  Special equipment needed: Not Applicable    Be sure to schedule a follow-up appointment with your primary care doctor or any specialists as instructed.     Discharge Plan:   Diet Plan: Discussed  Activity Level: Discussed  Confirmed Follow up Appointment: Patient to Call and Schedule Appointment  Confirmed Symptoms Management: Discussed  Medication Reconciliation Updated: Yes  Influenza Vaccine Indication: Not indicated: Previously immunized this influenza season and > 8 years of age    I understand that a diet low in cholesterol, fat, and sodium is recommended for good health. Unless I have been given specific instructions below for another diet, I accept this instruction as my diet prescription.   Other diet: Regular    Special Instructions: None    · Is patient discharged on Warfarin / Coumadin?   No     Depression / Suicide Risk    As you are discharged from this [facility] facility, it is important to learn how to keep safe from harming yourself.    Recognize the warning signs:  · Abrupt changes in personality, positive or negative- including increase in energy   · Giving away possessions  · Change in eating patterns-  significant weight changes-  positive or negative  · Change in sleeping patterns- unable to sleep or sleeping all the time   · Unwillingness or inability to communicate  · Depression  · Unusual sadness, discouragement and loneliness  · Talk of wanting to die  · Neglect of personal appearance   · Rebelliousness- reckless behavior  · Withdrawal from people/activities they love  · Confusion- inability to concentrate     If you or a loved one observes any of these behaviors or has concerns about self-harm, here's what you can do:  · Talk about it- your feelings and reasons for harming yourself  · Remove any means that you might use to hurt yourself (examples: pills, rope, extension cords, firearm)  · Get professional help from the community (Mental Health, Substance Abuse, psychological counseling)  · Do not be alone:Call your Safe Contact- someone whom you trust who will be there for you.  · Call your local CRISIS HOTLINE 076-4646 or 363-741-1468  · Call your local Children's Mobile Crisis Response Team [place] (568) 720-0595 or www.[employer]  · Call the toll free National Suicide Prevention Hotlines   · National Suicide Prevention Lifeline 321-303-YIPO (9984)  · National Hope Line Network 800-SUICIDE (782-6562)

## 2018-12-15 NOTE — CARE PLAN
Problem: Safety  Goal: Will remain free from injury  Outcome: PROGRESSING AS EXPECTED  Patient educated on the importance of calling a staff member before getting out of bed. Patient verbalizes understanding and patient calling appropriately. Bed in lowest position, call light and other belongings within reach, treaded socks on, and hourly rounding in place. Close to nursing station.      Problem: Pain Management  Goal: Pain level will decrease to patient's comfort goal  Outcome: PROGRESSING AS EXPECTED  Pt educated on the 1-10 pain scale. Pt verbalizes understanding. Pt states that current drug regimen is effectively controlling pain.

## 2018-12-15 NOTE — PROGRESS NOTES
Bedside report received.  Assessment complete.  A&O x 4. Patient calls appropriately.  Patient up with no assist.   Patient has 0/10 pain. Declines intervention at this time   Denies N&V. Tolerating GI soft diet.  Surgical Midline incision with dressing present. Old drainage present, otherwise clean and dry.  + void, + flatus  Patient denies SOB.  SCD's off.  Review plan with of care with patient. Call light and personal belongings with in reach. Hourly rounding in place. All needs met at this time.

## 2018-12-15 NOTE — PROGRESS NOTES
Report received. POC discussed. Assumed care of pt.  Call light in reach. Hourly rounding in progress.  AxOx4. Calls appropriately.  Pt gets up SBA Pt diet GI soft, tolerating well. LBM 12/15  Midline incision, dressing in place. Shadowing present. No new drainage noted.  Pt O2 sat 93 on RA.  in place. Pulls 1500 on IS  Pain 0/10  +void +flatus -NV  SCDs on and connected.   All needs met at this time.     No overnight complaints of pain. Pt motivated to ambulate. Pt OOB for all meals. Saline locked this AM, pt PO intake adequate.

## 2018-12-15 NOTE — PROGRESS NOTES
Acute pain   This is a new problem.   Patient is complaining of pain in her surgical incision.   Pain is constant, described as sharp and a 3/10 on the pain scale.   Treatments tried include:Tylenol, NSAIDs, heat, ice, rest, movement     Is the pain medication improving the patient’s symptoms: Never prescribed  Any adverse effects: No  Alcohol or illicit drug use:   She  reports that she does not drink alcohol.  She  reports that she does not use drugs.     History of controlled substance used in a way other than prescribed? No     Any early refills of a controlled substance: No  History of lost or stolen controlled substance prescription: No  Any aberrant behavior or intoxication while on a controlled substance: No  Has the patient self-modified their dose or frequency of the medication :No  Compliant with treatment recommendations and plan: Yes  Any major health change to the patient: Yes Surgery 12/13/2018  Concerns for misuse, abuse or addiction: No  /NarxCheck report reviewed: Yes  History of abnormal drug screening: No  I have assessed the patient’s risk for abuse, dependency, and addiction using the validated Opioid Risk Tool.     Opioid Risk Score: 0  low risk     Interpretation of Opioid Risk Score   Score 0-3 = Low risk of abuse. Do UDS at least once per year.  Score 4-7 = Moderate risk of abuse. Do UDS 1-4 times per year.  Score 8+ = High risk of abuse. Refer to specialist.     I have conducted a physical exam and documented findings.     I certify that I have obtained and reviewed her medical history. I have also made a good christen effort to obtain applicable records from other providers who have treated the patient.  I have reviewed the patient's prescription history as maintained by the [place] Prescription Monitoring Program.      Given the above, I believe the benefits of controlled substance therapy outweigh the risks. The reasons for prescribing controlled substances include non-narcotic, oral  analgesic alternatives have been inadequate for pain control. Accordingly, I have discussed the risk and benefits, treatment plan, and alternative therapies with the patient. Patient was advised that this medicine is intended for short term (no more than 14 days)/intermittent use only and not intended to be an ongoing prescription.

## 2018-12-15 NOTE — PROGRESS NOTES
Discharge ordered. Went over discharge instructions. Pt educated on incision care and follow up Pt educated to follow up with [facility] Pt alert and oriented. Patient has all belongings. Pt left with family to home. Narcotic consent signed

## 2018-12-15 NOTE — PROGRESS NOTES
Progress Note               Author: Elicia C Sathish Date & Time created: 12/15/2018  10:14 AM     Interval History:  Doing well. Minimal use of narcotic pain medications with minimal pain. No N/V. Tolerating regular diet. +ambulation. +flatus, +BM.     Review of Systems:  Review of Systems   Constitutional: Negative for chills and fever.   Gastrointestinal: Positive for abdominal pain. Negative for nausea and vomiting.       Physical Exam:  Physical Exam   Constitutional: She is oriented to person, place, and time. She appears well-developed and well-nourished. No distress.   HENT:   Head: Normocephalic and atraumatic.   Eyes: Conjunctivae are normal.   Neck: Normal range of motion.   Pulmonary/Chest: Effort normal.   Abdominal: Soft. She exhibits distension. There is tenderness.   Minimal distention, minimal tenderness around the incision.    Musculoskeletal: Normal range of motion.   Neurological: She is alert and oriented to person, place, and time.   Skin: Skin is warm and dry. She is not diaphoretic.   Psychiatric: She has a normal mood and affect. Her behavior is normal.       Labs:          Recent Labs      18   0353   SODIUM  137   POTASSIUM  4.2   CHLORIDE  106   CO2  27   BUN  11   CREATININE  0.63   CALCIUM  8.4*     Recent Labs      18   0353   GLUCOSE  148*     Recent Labs      18   0353   RBC  4.04*   HEMOGLOBIN  13.2   HEMATOCRIT  39.2   PLATELETCT  185     Recent Labs      18   0353   WBC  12.1*     Hemodynamics:  Temp (24hrs), Av.6 °C (97.8 °F), Min:36.1 °C (96.9 °F), Max:37.1 °C (98.7 °F)  Temperature: 36.1 °C (96.9 °F)  Pulse  Av  Min: 64  Max: 92   Blood Pressure : 136/83     Respiratory:    Respiration: 15, Pulse Oximetry: 95 %        RUL Breath Sounds: Clear, RML Breath Sounds: Clear, RLL Breath Sounds: Clear, MOUSTAPHA Breath Sounds: Clear, LLL Breath Sounds: Clear  Fluids:    Intake/Output Summary (Last 24 hours) at 12/15/18 1014  Last data filed at 12/15/18  0800   Gross per 24 hour   Intake          2233.83 ml   Output                0 ml   Net          2233.83 ml        GI/Nutrition:  Orders Placed This Encounter   Procedures   • Diet Order Low Fiber(GI Soft)     Standing Status:   Standing     Number of Occurrences:   1     Order Specific Question:   Diet:     Answer:   Low Fiber(GI Soft) [2]     Medical Decision Making, by Problem:  There are no active hospital problems to display for this patient.      Plan:  D/c home.   Ok to shower, no baths/swimming for two weeks.   No lifting more than 20 pounds for the next four weeks.   F/u with Dr. Bhagat in 2 weeks.     Quality-Core Measures   Reviewed items::  Labs reviewed  Gunderson catheter::  No Gunderson  DVT: Xarelto.  DVT prophylaxis - mechanical:  SCDs  Ulcer Prophylaxis::  Yes

## 2018-12-17 ENCOUNTER — PATIENT OUTREACH (OUTPATIENT)
Dept: HEALTH INFORMATION MANAGEMENT | Facility: OTHER | Age: 83
End: 2018-12-17

## 2018-12-27 RX ORDER — METHYLPREDNISOLONE 4 MG/1
4 TABLET ORAL
Qty: 30 TAB | Refills: 1 | Status: SHIPPED | OUTPATIENT
Start: 2018-12-27 | End: 2019-06-06

## 2018-12-27 NOTE — TELEPHONE ENCOUNTER
Was the patient seen in the last year in this department? Yes   Dec Labs  Does patient have an active prescription for medications requested? No     Received Request Via: Patient

## 2019-01-14 ENCOUNTER — PATIENT OUTREACH (OUTPATIENT)
Dept: HEALTH INFORMATION MANAGEMENT | Facility: OTHER | Age: 84
End: 2019-01-14

## 2019-01-15 NOTE — PROGRESS NOTES
Marry Mathur was admitted to [facility] on 12/13/18 for an Open Right Hemicolectomy, and was discharged on 12/15/18. Per discharge orders, patient had 2 follow-up appointments with Dr. Bhagat (General Surgery) on 12/21/18 and on 1/10/19. Patient declined to follow-up with PCP post-discharge and declined [facility]'s assistance with scheduling. Patient has a future appointment with Dr. Escoto (Rheumatology) on 1/17/19. PPS screening was not conducted secondary to low LACE score upon hospital discharge.

## 2019-01-17 ENCOUNTER — OFFICE VISIT (OUTPATIENT)
Dept: RHEUMATOLOGY | Facility: MEDICAL CENTER | Age: 84
End: 2019-01-17
Payer: MEDICARE

## 2019-01-17 VITALS
RESPIRATION RATE: 16 BRPM | WEIGHT: 164 LBS | HEART RATE: 74 BPM | TEMPERATURE: 97.5 F | BODY MASS INDEX: 29.05 KG/M2 | DIASTOLIC BLOOD PRESSURE: 70 MMHG | SYSTOLIC BLOOD PRESSURE: 130 MMHG | OXYGEN SATURATION: 95 %

## 2019-01-17 DIAGNOSIS — M81.0 SENILE OSTEOPOROSIS: ICD-10-CM

## 2019-01-17 DIAGNOSIS — I48.91 ATRIAL FIBRILLATION, UNSPECIFIED TYPE (HCC): ICD-10-CM

## 2019-01-17 DIAGNOSIS — C43.4 MALIGNANT MELANOMA OF NECK (HCC): ICD-10-CM

## 2019-01-17 DIAGNOSIS — I10 ESSENTIAL HYPERTENSION, BENIGN: ICD-10-CM

## 2019-01-17 DIAGNOSIS — Z79.01 CHRONIC ANTICOAGULATION: ICD-10-CM

## 2019-01-17 DIAGNOSIS — M05.79 RHEUMATOID ARTHRITIS INVOLVING MULTIPLE SITES WITH POSITIVE RHEUMATOID FACTOR (HCC): ICD-10-CM

## 2019-01-17 DIAGNOSIS — C80.1 ADENOCARCINOMA (HCC): ICD-10-CM

## 2019-01-17 PROCEDURE — 99214 OFFICE O/P EST MOD 30 MIN: CPT | Performed by: INTERNAL MEDICINE

## 2019-01-17 NOTE — PROGRESS NOTES
Chief Complaint- joint pain    Subjective:   Marry Mathur is a 83 y.o. female here today for follow up of rheumatological issues     This is a follow-up visit for this patient who we see in this clinic for rheumatoid arthritis, patient had been on Xeljanz but that was stopped because of finding of colonic adenocarcinoma now status post resection patient states she is feeling quite well and hopes to stay off the Xeljanz.  Patient however continues to be on Medrol at 4 mg p.o. daily.     Other issues include a finding of osteoporosis on patient's bone density scan from March 2018, patient was not able to tolerate Fosamax because of a history of Araya's esophagus, we did give patient information on Prolia injections at last visit patient states that she is willing to start something i.e. Prolia, other option may include Reclast.      Additional comorbidities include diabetes for which patient  takes metformin, also with a diagnosis of spinal stenosis with intermittent weakness in her legs with progressive numbness and weakness in the left leg.  Patient also with protein S deficiency with a history of DVT on chronic anticoagulation.  Patient also with a history of Araya's esophagus.        S/p Remicade-stopped because of hx of melanoma about 1996 and has multiple other skin cancers  S/p Orencia-lost efficacy  S/p Humira-stopped because of recurrence of melanoma October 2017  S/p MTX-stopped because of development of skin cancer/melanoma October 2017  S/p NSAIDS-relatively contraindicated as patient is on chronic anticoagulation      DEXA 3/18/2016 T scores 1.1, -1.1   FRAX 3/18/2016 major osteoporotic fracture risk 14.3%, hip fracture risks 3.7%  DEXA 3/23/2018 T scores 0.2, -1.4  FRAX 3/23/2018 major osteoporotic fracture risk 19.3%, hip fracture risk 6.1%  Echocardiogram 7/2012 [facility]  RF neg 3/2014 [facility]; RF neg 6/2014 [facility]; RF neg 6/2016  CCP neg 3/2014 [facility]; CCP  neg 6/2014 [facility]; CCP neg 6/2016  Uric acid 4.7 3/2014 [facility];Uric acid 4.5 6/2016  Hep B neg 6/2016  Quantiferon Gold neg 6/2014 [facility]; Quantiferon Gold neg 6/2016  Hand x-rays 3/2016-indicate osteoarthritis  Feet x-rays 3/2016-indicate osteoarthritis     Corticosteroid Therapy Informed Consent signed 1/17/2019-copy given to patient      Current medicines (including changes today)  Current Outpatient Prescriptions   Medication Sig Dispense Refill   • methylPREDNISolone (MEDROL) 4 MG Tab Take 1 Tab by mouth every bedtime. 30 Tab 1   • miconazole (MICOTIN) 2 % Cream Apply to the affected area twice a day. 1 Tube 1   • pregabalin (LYRICA) 75 MG Cap Take 75 mg by mouth 2 times a day.     • atorvastatin (LIPITOR) 10 MG Tab Take 10 mg by mouth every evening.     • omeprazole (PRILOSEC) 20 MG Tablet Delayed Response delayed-release tablet Take 1 Tab by mouth every morning.     • Diphenhydramine-APAP, sleep, (TYLENOL PM EXTRA STRENGTH)  MG Tab Take 2 Tabs by mouth every bedtime.     • Melatonin 10 MG Tab Take 1 Tab by mouth every bedtime.     • lidocaine (LIDODERM) 5 % Patch Apply 1 Patch to skin as directed every 24 hours. 90 Patch 0   • rivaroxaban (XARELTO) 20 MG Tab tablet Take 1 Tab by mouth with dinner. 90 Tab 3   • vitamin D (CHOLECALCIFEROL) 1000 UNIT Tab Take 1,000 Units by mouth every morning.     • POTASSIUM GLUCONATE Take 1 Tab by mouth every morning. Pt unsure of dose     • Calcium Carbonate-Vitamin D (CALCIUM + D PO) Take 1 Tab by mouth every bedtime.     • ondansetron (ZOFRAN ODT) 4 MG TABLET DISPERSIBLE Take 1 Tab by mouth every 6 hours as needed for Nausea. 12 Tab 0   • FLUOROURACIL EX 1 Each by Apply externally route as needed.     • acetaminophen (TYLENOL) 500 MG Tab Take 1,000 mg by mouth every morning.       No current facility-administered medications for this visit.      She  has a past medical history of Adenocarcinoma of colon (HCC) (10/30/2018); Anesthesia; Atrial fibrillation [I48.91]  (4/19/2016); Back pain (6/27/2012); Blood clotting disorder ([facility]) (2012); Bowel habit changes; Cancer ([facility]); Cataract; Chronic back pain greater than 3 months duration; Deep vein thrombosis ([facility]) (3/8/2016); Essential hypertension, benign (6/27/2012); GERD (gastroesophageal reflux disease) (6/27/2012); Heart murmur; Hyperlipidemia; Hypertension; Impaired fasting glucose (11/2/2017); Mild aortic stenosis (11/2/2017); Other and unspecified hyperlipidemia (6/27/2012); Prediabetes; Protein S deficiency ([facility]) (11/2/2017); Rheumatoid arthritis involving multiple sites with positive rheumatoid factor ([facility]) (03/14/2016); Rheumatoid nodule ([facility]) (7/25/2017); Right bundle branch block (6/27/2012); and Urinary incontinence (11/2/2017).    ROS   Other than what is mentioned in HPI or physical exam, there is no history of headaches, double vision or blurred vision. No temporal tenderness or jaw claudication. No history of cataracts or glaucoma. No trouble swallowing difficulties or sore throats.  No chest complaints including chest pain, cough or sputum production. No GI complaints including nausea, vomiting, change in bowel habits, or past peptic ulcer disease. No history of blood in the stools. No urinary complaints including dysuria or frequency. No history of alopecia, photosensitivity, oral ulcerations, Raynaud's phenomena.       Objective:     Blood pressure 130/70, pulse 74, temperature 36.4 °C (97.5 °F), temperature source Temporal, resp. rate 16, weight 74.4 kg (164 lb), SpO2 95 %, not currently breastfeeding. Body mass index is 29.05 kg/m².   Physical Exam:    Constitutional: Alert and oriented X3, patient is talkative with good eye contact.Skin: Warm, dry, good turgor, no rashes in visible areas, no psoriatic lesions, no petechial lesions, no malar rashes  .Eye: Equal, round and reactive, conjunctiva clear, lids normal EOM intactENMT: Lips without lesions, good dentition, no oropharyngeal ulcers, moist buccal mucosa,  pinna without deformityNeck: Trachea midline, no masses, no thyromegaly.Lymph:  No cervical lymphadenopathy, no axillary lymphadenopathy, no supraclavicular lymphadenopathyRespiratory: Unlabored respiratory effort, lungs clear to auscultation, no wheezes, no ronchi.Cardiovascular: Normal S1, S2, no murmur, no edema.Abdomen: Soft, non-tender, no masses, no hepatosplenomegaly.Psych: Alert and oriented x3, normal affect and mood.Neuro: Cranial nerves 2-12 are grossly intact, no loss of sensation LEExt:no joint laxity noted in bilateral arms, no joint laxity noted in bilateral legs, no ulnar deviation, there is some ulnar styloid prominence bilateral wrists, evidence of Heberden's nodes and Ana's nodes both hands, no christin swan-neck or boutonniere deformities, toes without crossover toes and without splay toes, there is some squaring of the thumb CMC joints bilaterally    Lab Results   Component Value Date/Time    QNTTBGOLD Negative 06/29/2016 02:03 PM     Lab Results   Component Value Date/Time    HEPBCORIGM Negative 06/29/2016 02:03 PM    HEPBSAG Negative 06/29/2016 02:03 PM     Lab Results   Component Value Date/Time    SODIUM 137 12/14/2018 03:53 AM    POTASSIUM 4.2 12/14/2018 03:53 AM    CHLORIDE 106 12/14/2018 03:53 AM    CO2 27 12/14/2018 03:53 AM    GLUCOSE 148 (H) 12/14/2018 03:53 AM    BUN 11 12/14/2018 03:53 AM    CREATININE 0.63 12/14/2018 03:53 AM      Lab Results   Component Value Date/Time    WBC 12.1 (H) 12/14/2018 03:53 AM    RBC 4.04 (L) 12/14/2018 03:53 AM    HEMOGLOBIN 13.2 12/14/2018 03:53 AM    HEMATOCRIT 39.2 12/14/2018 03:53 AM    MCV 97.0 12/14/2018 03:53 AM    MCH 32.7 12/14/2018 03:53 AM    MCHC 33.7 12/14/2018 03:53 AM    MPV 11.8 12/14/2018 03:53 AM    NEUTSPOLYS 74.60 (H) 11/01/2018 11:01 AM    LYMPHOCYTES 16.10 (L) 11/01/2018 11:01 AM    MONOCYTES 8.10 11/01/2018 11:01 AM    EOSINOPHILS 0.60 11/01/2018 11:01 AM    BASOPHILS 0.40 11/01/2018 11:01 AM      Lab Results   Component Value  Date/Time    CALCIUM 8.4 (L) 12/14/2018 03:53 AM    ASTSGOT 23 11/01/2018 11:01 AM    ALTSGPT 24 11/01/2018 11:01 AM    ALKPHOSPHAT 62 11/01/2018 11:01 AM    TBILIRUBIN 0.7 11/01/2018 11:01 AM    ALBUMIN 4.3 11/01/2018 11:01 AM    TOTPROTEIN 6.8 11/01/2018 11:01 AM     Lab Results   Component Value Date/Time    URICACID 4.5 06/29/2016 02:03 PM    RHEUMFACTN <10 06/29/2016 02:03 PM    CCPANTIBODY 4 06/29/2016 02:03 PM     Lab Results   Component Value Date/Time    SEDRATEWES 11 07/07/2018 11:25 AM     Lab Results   Component Value Date/Time    HBA1C 5.5 05/16/2016 07:50 AM     Results for orders placed during the hospital encounter of 03/18/16   DX-JOINT SURVEY-HANDS SINGLE VIEW    Impression Multiple bilateral sites of osteoarthritis     Results for orders placed during the hospital encounter of 03/18/16   DX-JOINT SURVEY-FEET SINGLE VIEW    Impression 1.  Bilateral midfoot and forefoot osteoarthritis    2.  Bilateral bunion    3.  Prior fusion across the right 2nd proximal interphalangeal joint    4.  Foreign body or opaque material between 1st and 2nd phalanges     Results for orders placed during the hospital encounter of 03/23/18   DS-BONE DENSITY STUDY (DEXA)    Impression According to the World Health Organization classification, bone mineral density of this patient is osteopenia with increased risk of fracture. Percentage decrease in bone mineral density in the lumbar region is statistically significant.        10-year Probability of Fracture:  Major Osteoporotic     19.3%  Hip     6.1%  Population      [place] ()    Based on left femur neck BMD          INTERPRETING LOCATION:  [address] [place] [address], [address]     Results for orders placed during the hospital encounter of 01/14/09   DX-KNEE COMPLETE 4+    Impression IMPRESSION:     MILD PATELLOFEMORAL AND MEDIAL FEMOROTIBIAL COMPARTMENT DEGENERATIVE   OSTEOARTHROSIS.        GEK:clh     Read By ALEX WISE MD on Jan 14 2009  10:01AM  : JHONNY Transcription Date: Jovi 15 2009  8:11AM  THIS DOCUMENT HAS BEEN ELECTRONICALLY SIGNED BY: ALEX WISE MD on   Jan 16 2009  1:32PM        DX-SHOULDER 2+    Impression IMPRESSION:     NORMAL RADIOGRAPHS OF THE LEFT SHOULDER WITH NOTE MADE OF MINIMAL   DEGENERATIVE OSTEOARTHROSIS OF THE GLENOHUMERAL JOINT WITH MINIMAL   SPURRING.           Results for orders placed during the hospital encounter of 08/18/18   MR-LUMBAR SPINE-W/O    Impression Mild interval worsening L3/4 right paracentral protrusion results in mild worsening lateral recess stenosis    Otherwise stable multilevel degenerative change resulting in foraminal predominate stenoses, greatest is moderate to severe on the right at L3/4.    Lesser stenoses at other levels as detailed above    Stable L4/5 grade 1 anterolisthesis of secondary to severe facet arthropathy. Stable minimal degenerative retrolisthesis of the upper lumbar levels    Mature L5/S1 interbody fusion     Results for orders placed during the hospital encounter of 10/08/18   MR-KNEE-W/O LEFT    Impression Status post medial knee hemiarthroplasty without complication identified    Mild patellofemoral and lateral femorotibial osteoarthritis with some cartilage thinning and spurring but no full-thickness defects or areas of marrow edema are identified     Results for orders placed during the hospital encounter of 01/14/09   DX-CERVICAL SPINE-2 OR 3 VIEWS    Impression IMPRESSION:     DEGENERATIVE DISC AND FACET ARTHROPATHY C5-6, C6-7, AND DEGENERATIVE   FACET ARTHROPATHY AT C7-T1.           Assessment and Plan:     1. Rheumatoid arthritis involving multiple sites with positive rheumatoid factor (HCC)  Patient issues at this point is predominantly DJD, patient wants to stay off Xeljanz for a while, we also discussed going off Medrol.  Patient states understanding and will comply    2. Adenocarcinoma (HCC)  Status post a polyp removal with complete removal of all  adenocarcinoma    3. Senile osteoporosis  Last DEXA March 2018 indicating osteoporosis we will start Reclast infusions 5 mg IV every year  Next DEXA March 2020   continue calcium 1200 mg by mouth daily and vitamin D about 1000 units by mouth daily and magnesium 400 mg by mouth daily    4. Essential hypertension, benign  May impact the type of medications we can use for this patient's arthritis. We will have to keep this under advisement.    5. Atrial fibrillation, unspecified type (HCC)  Patient on chronic anticoagulation  This will impact with kind of medications we can use for this patient's arthritis, NSAIDs are contraindicated because of increased risk of bleeding while on chronic anticoagulation    6. Chronic anticoagulation  This will impact with kind of medications we can use for this patient's arthritis, NSAIDs are contraindicated because of increased risk of bleeding while on chronic anticoagulation    7. Malignant melanoma of neck (HCC)  Restrict the use of Biologics especially TNF inhibitors    Followup: Return in about 3 months (around 4/17/2019). or sooner prn to eval stability of patient's rheumatoid arthritis off of DMARDS.    Marry Mathur was seen 30 minutes face-to-face of which more than 50% of the time was spent counseling the patient (excluding time for procedures)  regarding  rheumatological condition and care. Therapy was discussed in detail.    Please note that this dictation was created using voice recognition software. I have made every reasonable attempt to correct obvious errors, but I expect that there are errors of grammar and possibly content that I did not discover before finalizing the note.

## 2019-01-17 NOTE — LETTER
[facility]   [address] [place] [address], [address]  [place] [address]  Phone: [phone]  Fax: [phone]              Encounter Date: 1/17/2019    Dear Dr. Eaton ref. provider found,    It was a pleasure seeing your patient, Marry Mathur, on 1/17/2019. Diagnoses of Rheumatoid arthritis involving multiple sites with positive rheumatoid factor (HCC), Adenocarcinoma (HCC), Senile osteoporosis, Essential hypertension, benign, Atrial fibrillation, unspecified type (HCC), Chronic anticoagulation, and Malignant melanoma of neck (HCC) were pertinent to this visit.     Please find attached progress note which includes the history I obtained from Ms. Mathur, my physical examination findings, my impression and recommendations.      Once again, it was a pleasure participating in your patient's care.  Please feel free to contact me if you have any questions or if I can be of any further assistance to your patients.      Sincerely,    Deanna Escoto M.D.  Electronically Signed          PROGRESS NOTE:  No notes on file

## 2019-01-23 ENCOUNTER — HOSPITAL ENCOUNTER (OUTPATIENT)
Dept: RADIOLOGY | Facility: MEDICAL CENTER | Age: 84
End: 2019-01-23
Attending: PHYSICAL MEDICINE & REHABILITATION
Payer: MEDICARE

## 2019-01-23 DIAGNOSIS — M25.552 LEFT HIP PAIN: ICD-10-CM

## 2019-01-23 PROCEDURE — 73721 MRI JNT OF LWR EXTRE W/O DYE: CPT | Mod: LT

## 2019-02-06 ENCOUNTER — HOSPITAL ENCOUNTER (OUTPATIENT)
Dept: RADIOLOGY | Facility: MEDICAL CENTER | Age: 84
End: 2019-02-06
Attending: INTERNAL MEDICINE
Payer: MEDICARE

## 2019-02-06 DIAGNOSIS — Z12.31 VISIT FOR SCREENING MAMMOGRAM: ICD-10-CM

## 2019-02-06 PROCEDURE — 77063 BREAST TOMOSYNTHESIS BI: CPT

## 2019-02-13 ENCOUNTER — APPOINTMENT (RX ONLY)
Dept: URBAN - METROPOLITAN AREA CLINIC 4 | Facility: CLINIC | Age: 84
Setting detail: DERMATOLOGY
End: 2019-02-13

## 2019-02-13 DIAGNOSIS — L81.4 OTHER MELANIN HYPERPIGMENTATION: ICD-10-CM

## 2019-02-13 DIAGNOSIS — Z86.006 PERSONAL HISTORY OF MELANOMA IN-SITU: ICD-10-CM

## 2019-02-13 DIAGNOSIS — L30.4 ERYTHEMA INTERTRIGO: ICD-10-CM

## 2019-02-13 DIAGNOSIS — Z85.820 PERSONAL HISTORY OF MALIGNANT MELANOMA OF SKIN: ICD-10-CM

## 2019-02-13 DIAGNOSIS — D18.0 HEMANGIOMA: ICD-10-CM

## 2019-02-13 DIAGNOSIS — L82.1 OTHER SEBORRHEIC KERATOSIS: ICD-10-CM

## 2019-02-13 DIAGNOSIS — D22 MELANOCYTIC NEVI: ICD-10-CM

## 2019-02-13 DIAGNOSIS — Z87.2 PERSONAL HISTORY OF DISEASES OF THE SKIN AND SUBCUTANEOUS TISSUE: ICD-10-CM

## 2019-02-13 DIAGNOSIS — L57.0 ACTINIC KERATOSIS: ICD-10-CM

## 2019-02-13 DIAGNOSIS — Z85.828 PERSONAL HISTORY OF OTHER MALIGNANT NEOPLASM OF SKIN: ICD-10-CM

## 2019-02-13 DIAGNOSIS — Z71.89 OTHER SPECIFIED COUNSELING: ICD-10-CM

## 2019-02-13 PROBLEM — D18.01 HEMANGIOMA OF SKIN AND SUBCUTANEOUS TISSUE: Status: ACTIVE | Noted: 2019-02-13

## 2019-02-13 PROBLEM — D22.5 MELANOCYTIC NEVI OF TRUNK: Status: ACTIVE | Noted: 2019-02-13

## 2019-02-13 PROBLEM — D48.5 NEOPLASM OF UNCERTAIN BEHAVIOR OF SKIN: Status: ACTIVE | Noted: 2019-02-13

## 2019-02-13 PROCEDURE — 11102 TANGNTL BX SKIN SINGLE LES: CPT | Mod: 59

## 2019-02-13 PROCEDURE — ? PRESCRIPTION

## 2019-02-13 PROCEDURE — ? BIOPSY BY SHAVE METHOD

## 2019-02-13 PROCEDURE — ? LIQUID NITROGEN

## 2019-02-13 PROCEDURE — 99214 OFFICE O/P EST MOD 30 MIN: CPT | Mod: 25

## 2019-02-13 PROCEDURE — 17004 DESTROY PREMAL LESIONS 15/>: CPT

## 2019-02-13 PROCEDURE — ? ADDITIONAL NOTES

## 2019-02-13 PROCEDURE — 11103 TANGNTL BX SKIN EA SEP/ADDL: CPT

## 2019-02-13 PROCEDURE — ? COUNSELING

## 2019-02-13 RX ORDER — NYSTATIN AND TRIAMCINOLONE ACETONIDE 100000; 1 MG/G; MG/G
CREAM TOPICAL TWICE A DAY
Qty: 1 | Refills: 1 | Status: ERX

## 2019-02-13 ASSESSMENT — LOCATION SIMPLE DESCRIPTION DERM
LOCATION SIMPLE: LEFT UPPER BACK
LOCATION SIMPLE: RIGHT ANTERIOR NECK
LOCATION SIMPLE: LEFT EYEBROW
LOCATION SIMPLE: LEFT FOREARM
LOCATION SIMPLE: LEFT ZYGOMA
LOCATION SIMPLE: TRAPEZIAL NECK
LOCATION SIMPLE: SCALP
LOCATION SIMPLE: LEFT BREAST
LOCATION SIMPLE: LEFT HAND
LOCATION SIMPLE: RIGHT TEMPLE
LOCATION SIMPLE: CHEST
LOCATION SIMPLE: RIGHT FOREARM
LOCATION SIMPLE: LEFT CHEEK
LOCATION SIMPLE: RIGHT HAND
LOCATION SIMPLE: LEFT INFERIOR EYELID
LOCATION SIMPLE: LEFT PRETIBIAL REGION
LOCATION SIMPLE: RIGHT EYELID
LOCATION SIMPLE: RIGHT BREAST
LOCATION SIMPLE: RIGHT CHEEK
LOCATION SIMPLE: ABDOMEN
LOCATION SIMPLE: POSTERIOR NECK

## 2019-02-13 ASSESSMENT — LOCATION ZONE DERM
LOCATION ZONE: NECK
LOCATION ZONE: SCALP
LOCATION ZONE: LEG
LOCATION ZONE: HAND
LOCATION ZONE: FACE
LOCATION ZONE: EYELID
LOCATION ZONE: ARM
LOCATION ZONE: TRUNK

## 2019-02-13 ASSESSMENT — LOCATION DETAILED DESCRIPTION DERM
LOCATION DETAILED: RIGHT PROXIMAL DORSAL FOREARM
LOCATION DETAILED: LEFT DISTAL DORSAL FOREARM
LOCATION DETAILED: RIGHT RADIAL DORSAL HAND
LOCATION DETAILED: LEFT MEDIAL UPPER BACK
LOCATION DETAILED: LEFT INFERIOR CENTRAL MALAR CHEEK
LOCATION DETAILED: RIGHT INFERIOR ANTERIOR NECK
LOCATION DETAILED: LEFT CENTRAL EYEBROW
LOCATION DETAILED: RIGHT SUPERIOR CENTRAL BUCCAL CHEEK
LOCATION DETAILED: RIGHT MEDIAL CANTHUS
LOCATION DETAILED: LEFT MEDIAL BREAST 7-8:00 REGION
LOCATION DETAILED: LEFT LATERAL INFERIOR EYELID
LOCATION DETAILED: MIDDLE STERNUM
LOCATION DETAILED: LEFT LATERAL SUPERIOR CHEST
LOCATION DETAILED: MID TRAPEZIAL NECK
LOCATION DETAILED: RIGHT ULNAR DORSAL HAND
LOCATION DETAILED: LEFT MEDIAL ZYGOMA
LOCATION DETAILED: LEFT MEDIAL SUPERIOR CHEST
LOCATION DETAILED: STERNAL NOTCH
LOCATION DETAILED: RIGHT INFERIOR LATERAL NECK
LOCATION DETAILED: LEFT RADIAL DORSAL HAND
LOCATION DETAILED: RIGHT LATERAL BREAST 6-7:00 REGION
LOCATION DETAILED: LEFT DISTAL PRETIBIAL REGION
LOCATION DETAILED: EPIGASTRIC SKIN
LOCATION DETAILED: LEFT SUPERIOR PREAURICULAR CHEEK
LOCATION DETAILED: RIGHT MEDIAL TEMPLE
LOCATION DETAILED: RIGHT DORSAL MIDDLE METACARPOPHALANGEAL JOINT
LOCATION DETAILED: LEFT SUPERIOR PARIETAL SCALP
LOCATION DETAILED: RIGHT MEDIAL MALAR CHEEK
LOCATION DETAILED: MID POSTERIOR NECK

## 2019-02-13 NOTE — PROCEDURE: ADDITIONAL NOTES
Additional Notes: Recommended for the patient to get up to date on gynecological exam.
Detail Level: Simple
Additional Notes: Discontinue all treatment except for mupirocin ointment. Continue for the full 10 days of treatment. If not resolved she will  prescription for triamcinolone-nystatin cream.

## 2019-02-13 NOTE — HPI: MELANOMA F/U (HISTORY OF MALIGNANT MELANOMA)
What Is The Reason For Today's Visit?: Surveillance against skin cancer recurrences
Year Excised?: 1992, 2017, 2018

## 2019-02-13 NOTE — PROCEDURE: BIOPSY BY SHAVE METHOD
Size Of Lesion In Cm: 0.7
Consent: Written consent was obtained and risks were reviewed including but not limited to scarring, infection, bleeding, scabbing, incomplete removal, nerve damage and allergy to anesthesia.
Destruction After The Procedure: No
Anesthesia Type: 1% lidocaine with epinephrine
Curettage Text: The wound bed was treated with curettage after the biopsy was performed.
Lab: [id number]
Biopsy Type: H and E
Wound Care: Vaseline
Electrodesiccation Text: The wound bed was treated with electrodesiccation after the biopsy was performed.
Cryotherapy Text: The wound bed was treated with cryotherapy after the biopsy was performed.
Additional Anesthesia Volume In Cc (Will Not Render If 0): 0
Depth Of Biopsy: dermis
Lab Facility: 
Render Post-Care Instructions In Note?: yes
Anesthesia Volume In Cc: 0.5
Type Of Destruction Used: Curettage
Electrodesiccation And Curettage Text: The wound bed was treated with electrodesiccation and curettage after the biopsy was performed.
Billing Type: Third-Party Bill
Silver Nitrate Text: The wound bed was treated with silver nitrate after the biopsy was performed.
Hemostasis: Drysol and Electrocautery
Detail Level: Detailed
Notification Instructions: Patient will be notified of biopsy results. However, patient instructed to call the office if not contacted within 2 weeks.
Post-Care Instructions: I reviewed with the patient in detail post-care instructions. Patient is to keep the biopsy site dry overnight, and then apply vaseline twice daily until healed.
Biopsy Method: Personna blade
Dressing: bandage
Size Of Lesion In Cm: 0.6

## 2019-02-14 ENCOUNTER — HOSPITAL ENCOUNTER (OUTPATIENT)
Dept: RADIOLOGY | Facility: MEDICAL CENTER | Age: 84
End: 2019-02-14
Attending: PHYSICAL MEDICINE & REHABILITATION
Payer: MEDICARE

## 2019-02-14 DIAGNOSIS — M25.552 LEFT HIP PAIN: ICD-10-CM

## 2019-02-14 PROCEDURE — 77002 NEEDLE LOCALIZATION BY XRAY: CPT

## 2019-02-14 PROCEDURE — 700101 HCHG RX REV CODE 250: Performed by: PHYSICAL MEDICINE & REHABILITATION

## 2019-02-14 PROCEDURE — 20610 DRAIN/INJ JOINT/BURSA W/O US: CPT | Mod: LT

## 2019-02-14 PROCEDURE — 700111 HCHG RX REV CODE 636 W/ 250 OVERRIDE (IP): Mod: JG | Performed by: PHYSICAL MEDICINE & REHABILITATION

## 2019-02-14 PROCEDURE — 700117 HCHG RX CONTRAST REV CODE 255: Performed by: PHYSICAL MEDICINE & REHABILITATION

## 2019-02-14 RX ORDER — TRIAMCINOLONE ACETONIDE 40 MG/ML
40 INJECTION, SUSPENSION INTRA-ARTICULAR; INTRAMUSCULAR ONCE
Status: COMPLETED | OUTPATIENT
Start: 2019-02-14 | End: 2019-02-14

## 2019-02-14 RX ORDER — LIDOCAINE HYDROCHLORIDE 10 MG/ML
20 INJECTION, SOLUTION INFILTRATION; PERINEURAL ONCE
Status: COMPLETED | OUTPATIENT
Start: 2019-02-14 | End: 2019-02-14

## 2019-02-14 RX ADMIN — TRIAMCINOLONE ACETONIDE 40 MG: 40 INJECTION, SUSPENSION INTRA-ARTICULAR; INTRAMUSCULAR at 13:50

## 2019-02-14 RX ADMIN — IOHEXOL 5 ML: 300 INJECTION, SOLUTION INTRAVENOUS at 13:50

## 2019-02-14 RX ADMIN — LIDOCAINE HYDROCHLORIDE 4 ML: 10 INJECTION, SOLUTION INFILTRATION; PERINEURAL at 13:50

## 2019-02-28 ENCOUNTER — HOSPITAL ENCOUNTER (OUTPATIENT)
Dept: CARDIOLOGY | Facility: MEDICAL CENTER | Age: 84
End: 2019-02-28
Attending: INTERNAL MEDICINE
Payer: MEDICARE

## 2019-02-28 DIAGNOSIS — R01.1 CARDIAC MURMUR: ICD-10-CM

## 2019-02-28 DIAGNOSIS — I10 HYPERTENSION, UNSPECIFIED TYPE: ICD-10-CM

## 2019-02-28 PROCEDURE — 93306 TTE W/DOPPLER COMPLETE: CPT

## 2019-03-01 ENCOUNTER — APPOINTMENT (RX ONLY)
Dept: URBAN - METROPOLITAN AREA CLINIC 31 | Facility: CLINIC | Age: 84
Setting detail: DERMATOLOGY
End: 2019-03-01

## 2019-03-01 PROBLEM — D04.9 CARCINOMA IN SITU OF SKIN, UNSPECIFIED: Status: ACTIVE | Noted: 2019-03-01

## 2019-03-01 PROBLEM — C44.91 BASAL CELL CARCINOMA OF SKIN, UNSPECIFIED: Status: ACTIVE | Noted: 2019-03-01

## 2019-03-01 LAB
LV EJECT FRACT MOD 2C 99903: 77.26
LV EJECT FRACT MOD 4C 99902: 75.7
LV EJECT FRACT MOD BP 99901: 76.2

## 2019-03-01 PROCEDURE — ? MOHS SURGERY PHONE CONSULTATION

## 2019-03-01 NOTE — PROCEDURE: MOHS SURGERY PHONE CONSULTATION
Has The Pathology Report Been Received?: Yes
If Yes- What Is The Patient's Pharmacy Number?: Patient has Rx on hand
Does The Patient Have A Pacemaker Or Defibrillator?: No
Which Antibiotic Do They Take For Surgical Prophylaxis?: Amoxicillin (2 grams)
If Yes- Additional Details: Partial Left knee 2004
Referring Provider: Amy Schoening
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
Detail Level: Simple
Date Of Mohs Surgery: 04/22/2019
Patient's Insurance: /LEO
Patient Reported Location: [id number]
If Yes- What Blood Thinners Do They Take?: Xarelto-for blood clots
Additional Information?: Patient is allergic to Sulfa
Office Location Of Mohs Surgery: [facility]
Pathology Accession #: [id number]. Right Medial Zygoma
Referring Provider: Amy Schoennig
Pathology Report Dx If Different Than Diagnosis Selected: SQUAMOUS CELL CARCINOMA IN SITU
If Yes- What Blood Thinners Do They Take?: Xarelto
Additional Information?: Sulfa
Patient Reported Location: E. Nasal Dorsum
Date Of Mohs Surgery: 05/06/2019
Pathology Accession #: [id number]
Pathology Report Dx If Different Than Diagnosis Selected: BASAL CELL CARCINOMA, SUPERFICIAL, MULTIFOCAL AND EARLY NODULAR PATTERNS
If Yes- Additional Details: left partial knee 2004
Patient Reported Location: A. Left Medial Zygoma
If Yes- What Is The Patient's Pharmacy Number?: rx on hand
Pathology Accession #: [id number]
Date Of Mohs Surgery: 05/20/2019
Additional Information?: Patient is allergic to sulfa. Patient has been informed that all 4 procedures are lengthy and to plan on spending the day at the office. She acknowledged this.

## 2019-03-05 ENCOUNTER — APPOINTMENT (RX ONLY)
Dept: URBAN - METROPOLITAN AREA CLINIC 4 | Facility: CLINIC | Age: 84
Setting detail: DERMATOLOGY
End: 2019-03-05

## 2019-03-05 DIAGNOSIS — L72.8 OTHER FOLLICULAR CYSTS OF THE SKIN AND SUBCUTANEOUS TISSUE: ICD-10-CM

## 2019-03-05 PROBLEM — C43.61 MALIGNANT MELANOMA OF RIGHT UPPER LIMB, INCLUDING SHOULDER: Status: ACTIVE | Noted: 2019-03-05

## 2019-03-05 PROCEDURE — 13121 CMPLX RPR S/A/L 2.6-7.5 CM: CPT

## 2019-03-05 PROCEDURE — ? OBSERVATION

## 2019-03-05 PROCEDURE — ? EXCISION

## 2019-03-05 PROCEDURE — 11606 EXC TR-EXT MAL+MARG >4 CM: CPT

## 2019-03-05 PROCEDURE — 13122 CMPLX RPR S/A/L ADDL 5 CM/>: CPT

## 2019-03-05 PROCEDURE — 99212 OFFICE O/P EST SF 10 MIN: CPT | Mod: 25

## 2019-03-05 PROCEDURE — ? ADDITIONAL NOTES

## 2019-03-05 ASSESSMENT — LOCATION DETAILED DESCRIPTION DERM: LOCATION DETAILED: MID POSTERIOR NECK

## 2019-03-05 ASSESSMENT — LOCATION SIMPLE DESCRIPTION DERM: LOCATION SIMPLE: POSTERIOR NECK

## 2019-03-05 ASSESSMENT — LOCATION ZONE DERM: LOCATION ZONE: NECK

## 2019-03-05 NOTE — PROCEDURE: ADDITIONAL NOTES
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
Detail Level: Simple
Additional Notes: Will schedule for excision

## 2019-03-05 NOTE — PROCEDURE: EXCISION
Repair Anesthesia Method: local infiltration
Complex Repair And Single Advancement Flap Text: The defect edges were debeveled with a #15 scalpel blade.  The primary defect was closed partially with a complex linear closure.  Given the location of the remaining defect, shape of the defect and the proximity to free margins a single advancement flap was deemed most appropriate for complete closure of the defect.  Using a sterile surgical marker, an appropriate advancement flap was drawn incorporating the defect and placing the expected incisions within the relaxed skin tension lines where possible.    The area thus outlined was incised deep to adipose tissue with a #15 scalpel blade.  The skin margins were undermined to an appropriate distance in all directions utilizing iris scissors.
Crescentic Advancement Flap Text: The defect edges were debeveled with a #15 scalpel blade.  Given the location of the defect and the proximity to free margins a crescentic advancement flap was deemed most appropriate.  Using a sterile surgical marker, the appropriate advancement flap was drawn incorporating the defect and placing the expected incisions within the relaxed skin tension lines where possible.    The area thus outlined was incised deep to adipose tissue with a #15 scalpel blade.  The skin margins were undermined to an appropriate distance in all directions utilizing iris scissors.
Skin Substitute Units (Will Override Primary Defect Units If Greater Than 0): 0
Patient Will Remove Sutures At Home?: No
M-Plasty Complex Repair Preamble Text (Leave Blank If You Do Not Want): Extensive wide undermining was performed.
H Plasty Text: Given the location of the defect, shape of the defect and the proximity to free margins a H-plasty was deemed most appropriate for repair.  Using a sterile surgical marker, the appropriate advancement arms of the H-plasty were drawn incorporating the defect and placing the expected incisions within the relaxed skin tension lines where possible. The area thus outlined was incised deep to adipose tissue with a #15 scalpel blade. The skin margins were undermined to an appropriate distance in all directions utilizing iris scissors.  The opposing advancement arms were then advanced into place in opposite direction and anchored with interrupted buried subcutaneous sutures.
Complex Repair And Epidermal Autograft Text: The defect edges were debeveled with a #15 scalpel blade.  The primary defect was closed partially with a complex linear closure.  Given the location of the defect, shape of the defect and the proximity to free margins an epidermal autograft was deemed most appropriate to repair the remaining defect.  The graft was trimmed to fit the size of the remaining defect.  The graft was then placed in the primary defect, oriented appropriately, and sutured into place.
Helical Rim Advancement Flap Text: The defect edges were debeveled with a #15 blade scalpel.  Given the location of the defect and the proximity to free margins (helical rim) a double helical rim advancement flap was deemed most appropriate.  Using a sterile surgical marker, the appropriate advancement flaps were drawn incorporating the defect and placing the expected incisions between the helical rim and antihelix where possible.  The area thus outlined was incised through and through with a #15 scalpel blade.  With a skin hook and iris scissors, the flaps were gently and sharply undermined and freed up.
Show Curettage Variables: Yes
Anesthesia Volume In Cc: 12
Billing Type: Third-Party Bill
Ear Star Wedge Flap Text: The defect edges were debeveled with a #15 blade scalpel.  Given the location of the defect and the proximity to free margins (helical rim) an ear star wedge flap was deemed most appropriate.  Using a sterile surgical marker, the appropriate flap was drawn incorporating the defect and placing the expected incisions between the helical rim and antihelix where possible.  The area thus outlined was incised through and through with a #15 scalpel blade.
W Plasty Text: The lesion was extirpated to the level of the fat with a #15 scalpel blade.  Given the location of the defect, shape of the defect and the proximity to free margins a W-plasty was deemed most appropriate for repair.  Using a sterile surgical marker, the appropriate transposition arms of the W-plasty were drawn incorporating the defect and placing the expected incisions within the relaxed skin tension lines where possible.    The area thus outlined was incised deep to adipose tissue with a #15 scalpel blade.  The skin margins were undermined to an appropriate distance in all directions utilizing iris scissors.  The opposing transposition arms were then transposed into place in opposite direction and anchored with interrupted buried subcutaneous sutures.
Bilateral Helical Rim Advancement Flap Text: The defect edges were debeveled with a #15 blade scalpel.  Given the location of the defect and the proximity to free margins (helical rim) a bilateral helical rim advancement flap was deemed most appropriate.  Using a sterile surgical marker, the appropriate advancement flaps were drawn incorporating the defect and placing the expected incisions between the helical rim and antihelix where possible.  The area thus outlined was incised through and through with a #15 scalpel blade.  With a skin hook and iris scissors, the flaps were gently and sharply undermined and freed up.
Complex Repair And Dermal Autograft Text: The defect edges were debeveled with a #15 scalpel blade.  The primary defect was closed partially with a complex linear closure.  Given the location of the defect, shape of the defect and the proximity to free margins an dermal autograft was deemed most appropriate to repair the remaining defect.  The graft was trimmed to fit the size of the remaining defect.  The graft was then placed in the primary defect, oriented appropriately, and sutured into place.
A-T Advancement Flap Text: The defect edges were debeveled with a #15 scalpel blade.  Given the location of the defect, shape of the defect and the proximity to free margins an A-T advancement flap was deemed most appropriate.  Using a sterile surgical marker, an appropriate advancement flap was drawn incorporating the defect and placing the expected incisions within the relaxed skin tension lines where possible.    The area thus outlined was incised deep to adipose tissue with a #15 scalpel blade.  The skin margins were undermined to an appropriate distance in all directions utilizing iris scissors.
Complex Repair And Double Advancement Flap Text: The defect edges were debeveled with a #15 scalpel blade.  The primary defect was closed partially with a complex linear closure.  Given the location of the remaining defect, shape of the defect and the proximity to free margins a double advancement flap was deemed most appropriate for complete closure of the defect.  Using a sterile surgical marker, an appropriate advancement flap was drawn incorporating the defect and placing the expected incisions within the relaxed skin tension lines where possible.    The area thus outlined was incised deep to adipose tissue with a #15 scalpel blade.  The skin margins were undermined to an appropriate distance in all directions utilizing iris scissors.
Lazy S Intermediate Repair Preamble Text (Leave Blank If You Do Not Want): Undermining was performed with blunt dissection.
Cheek Interpolation Flap Text: A decision was made to reconstruct the defect utilizing an interpolation axial flap and a staged reconstruction.  A telfa template was made of the defect.  This telfa template was then used to outline the Cheek Interpolation flap.  The donor area for the pedicle flap was then injected with anesthesia.  The flap was excised through the skin and subcutaneous tissue down to the layer of the underlying musculature.  The interpolation flap was carefully excised within this deep plane to maintain its blood supply.  The edges of the donor site were undermined.   The donor site was closed in a primary fashion.  The pedicle was then rotated into position and sutured.  Once the tube was sutured into place, adequate blood supply was confirmed with blanching and refill.  The pedicle was then wrapped with xeroform gauze and dressed appropriately with a telfa and gauze bandage to ensure continued blood supply and protect the attached pedicle.
Banner Transposition Flap Text: The defect edges were debeveled with a #15 scalpel blade.  Given the location of the defect and the proximity to free margins a Banner transposition flap was deemed most appropriate.  Using a sterile surgical marker, an appropriate flap drawn around the defect. The area thus outlined was incised deep to adipose tissue with a #15 scalpel blade.  The skin margins were undermined to an appropriate distance in all directions utilizing iris scissors.
O-T Advancement Flap Text: The defect edges were debeveled with a #15 scalpel blade.  Given the location of the defect, shape of the defect and the proximity to free margins an O-T advancement flap was deemed most appropriate.  Using a sterile surgical marker, an appropriate advancement flap was drawn incorporating the defect and placing the expected incisions within the relaxed skin tension lines where possible.    The area thus outlined was incised deep to adipose tissue with a #15 scalpel blade.  The skin margins were undermined to an appropriate distance in all directions utilizing iris scissors.
Anesthesia Type: 1% lidocaine with epinephrine
Z Plasty Text: The lesion was extirpated to the level of the fat with a #15 scalpel blade.  Given the location of the defect, shape of the defect and the proximity to free margins a Z-plasty was deemed most appropriate for repair.  Using a sterile surgical marker, the appropriate transposition arms of the Z-plasty were drawn incorporating the defect and placing the expected incisions within the relaxed skin tension lines where possible.    The area thus outlined was incised deep to adipose tissue with a #15 scalpel blade.  The skin margins were undermined to an appropriate distance in all directions utilizing iris scissors.  The opposing transposition arms were then transposed into place in opposite direction and anchored with interrupted buried subcutaneous sutures.
Complex Repair And Modified Advancement Flap Text: The defect edges were debeveled with a #15 scalpel blade.  The primary defect was closed partially with a complex linear closure.  Given the location of the remaining defect, shape of the defect and the proximity to free margins a modified advancement flap was deemed most appropriate for complete closure of the defect.  Using a sterile surgical marker, an appropriate advancement flap was drawn incorporating the defect and placing the expected incisions within the relaxed skin tension lines where possible.    The area thus outlined was incised deep to adipose tissue with a #15 scalpel blade.  The skin margins were undermined to an appropriate distance in all directions utilizing iris scissors.
Repair Type: Complex
Complex Repair And Tissue Cultured Epidermal Autograft Text: The defect edges were debeveled with a #15 scalpel blade.  The primary defect was closed partially with a complex linear closure.  Given the location of the defect, shape of the defect and the proximity to free margins an tissue cultured epidermal autograft was deemed most appropriate to repair the remaining defect.  The graft was trimmed to fit the size of the remaining defect.  The graft was then placed in the primary defect, oriented appropriately, and sutured into place.
Epidermal Closure: running subcuticular
Cheek-To-Nose Interpolation Flap Text: A decision was made to reconstruct the defect utilizing an interpolation axial flap and a staged reconstruction.  A telfa template was made of the defect.  This telfa template was then used to outline the Cheek-To-Nose Interpolation flap.  The donor area for the pedicle flap was then injected with anesthesia.  The flap was excised through the skin and subcutaneous tissue down to the layer of the underlying musculature.  The interpolation flap was carefully excised within this deep plane to maintain its blood supply.  The edges of the donor site were undermined.   The donor site was closed in a primary fashion.  The pedicle was then rotated into position and sutured.  Once the tube was sutured into place, adequate blood supply was confirmed with blanching and refill.  The pedicle was then wrapped with xeroform gauze and dressed appropriately with a telfa and gauze bandage to ensure continued blood supply and protect the attached pedicle.
Bilobed Flap Text: The defect edges were debeveled with a #15 scalpel blade.  Given the location of the defect and the proximity to free margins a bilobe flap was deemed most appropriate.  Using a sterile surgical marker, an appropriate bilobe flap drawn around the defect.    The area thus outlined was incised deep to adipose tissue with a #15 scalpel blade.  The skin margins were undermined to an appropriate distance in all directions utilizing iris scissors.
Complex Repair And O-T Advancement Flap Text: The defect edges were debeveled with a #15 scalpel blade.  The primary defect was closed partially with a complex linear closure.  Given the location of the remaining defect, shape of the defect and the proximity to free margins an O-T advancement flap was deemed most appropriate for complete closure of the defect.  Using a sterile surgical marker, an appropriate advancement flap was drawn incorporating the defect and placing the expected incisions within the relaxed skin tension lines where possible.    The area thus outlined was incised deep to adipose tissue with a #15 scalpel blade.  The skin margins were undermined to an appropriate distance in all directions utilizing iris scissors.
V-Y Flap Text: The defect edges were debeveled with a #15 scalpel blade.  Given the location of the defect, shape of the defect and the proximity to free margins a V-Y flap was deemed most appropriate.  Using a sterile surgical marker, an appropriate advancement flap was drawn incorporating the defect and placing the expected incisions within the relaxed skin tension lines where possible.    The area thus outlined was incised deep to adipose tissue with a #15 scalpel blade.  The skin margins were undermined to an appropriate distance in all directions utilizing iris scissors.
Body Location Override (Optional - Billing Will Still Be Based On Selected Body Map Location If Applicable): right proximal lateral posterior upper arm
O-L Flap Text: The defect edges were debeveled with a #15 scalpel blade.  Given the location of the defect, shape of the defect and the proximity to free margins an O-L flap was deemed most appropriate.  Using a sterile surgical marker, an appropriate advancement flap was drawn incorporating the defect and placing the expected incisions within the relaxed skin tension lines where possible.    The area thus outlined was incised deep to adipose tissue with a #15 scalpel blade.  The skin margins were undermined to an appropriate distance in all directions utilizing iris scissors.
Complex Repair And A-T Advancement Flap Text: The defect edges were debeveled with a #15 scalpel blade.  The primary defect was closed partially with a complex linear closure.  Given the location of the remaining defect, shape of the defect and the proximity to free margins an A-T advancement flap was deemed most appropriate for complete closure of the defect.  Using a sterile surgical marker, an appropriate advancement flap was drawn incorporating the defect and placing the expected incisions within the relaxed skin tension lines where possible.    The area thus outlined was incised deep to adipose tissue with a #15 scalpel blade.  The skin margins were undermined to an appropriate distance in all directions utilizing iris scissors.
Complex Repair And Xenograft Text: The defect edges were debeveled with a #15 scalpel blade.  The primary defect was closed partially with a complex linear closure.  Given the location of the defect, shape of the defect and the proximity to free margins a xenograft was deemed most appropriate to repair the remaining defect.  The graft was trimmed to fit the size of the remaining defect.  The graft was then placed in the primary defect, oriented appropriately, and sutured into place.
Advancement-Rotation Flap Text: The defect edges were debeveled with a #15 scalpel blade.  Given the location of the defect, shape of the defect and the proximity to free margins an advancement-rotation flap was deemed most appropriate.  Using a sterile surgical marker, an appropriate flap was drawn incorporating the defect and placing the expected incisions within the relaxed skin tension lines where possible. The area thus outlined was incised deep to adipose tissue with a #15 scalpel blade.  The skin margins were undermined to an appropriate distance in all directions utilizing iris scissors.
Undermining Location (Optional): in the superficial subcutaneous fat
Path Notes (To The Dermatopathologist): Please check margins.
Interpolation Flap Text: A decision was made to reconstruct the defect utilizing an interpolation axial flap and a staged reconstruction.  A telfa template was made of the defect.  This telfa template was then used to outline the interpolation flap.  The donor area for the pedicle flap was then injected with anesthesia.  The flap was excised through the skin and subcutaneous tissue down to the layer of the underlying musculature.  The interpolation flap was carefully excised within this deep plane to maintain its blood supply.  The edges of the donor site were undermined.   The donor site was closed in a primary fashion.  The pedicle was then rotated into position and sutured.  Once the tube was sutured into place, adequate blood supply was confirmed with blanching and refill.  The pedicle was then wrapped with xeroform gauze and dressed appropriately with a telfa and gauze bandage to ensure continued blood supply and protect the attached pedicle.
Complex Repair And O-L Flap Text: The defect edges were debeveled with a #15 scalpel blade.  The primary defect was closed partially with a complex linear closure.  Given the location of the remaining defect, shape of the defect and the proximity to free margins an O-L flap was deemed most appropriate for complete closure of the defect.  Using a sterile surgical marker, an appropriate flap was drawn incorporating the defect and placing the expected incisions within the relaxed skin tension lines where possible.    The area thus outlined was incised deep to adipose tissue with a #15 scalpel blade.  The skin margins were undermined to an appropriate distance in all directions utilizing iris scissors.
Complex Repair And Skin Substitute Graft Text: The defect edges were debeveled with a #15 scalpel blade.  The primary defect was closed partially with a complex linear closure.  Given the location of the remaining defect, shape of the defect and the proximity to free margins a skin substitute graft was deemed most appropriate to repair the remaining defect.  The graft was trimmed to fit the size of the remaining defect.  The graft was then placed in the primary defect, oriented appropriately, and sutured into place.
Bilobed Transposition Flap Text: The defect edges were debeveled with a #15 scalpel blade.  Given the location of the defect and the proximity to free margins a bilobed transposition flap was deemed most appropriate.  Using a sterile surgical marker, an appropriate bilobe flap drawn around the defect.    The area thus outlined was incised deep to adipose tissue with a #15 scalpel blade.  The skin margins were undermined to an appropriate distance in all directions utilizing iris scissors.
Intermediate / Complex Repair - Final Wound Length In Cm: 10.5
Wound Care: Bacitracin
Trilobed Flap Text: The defect edges were debeveled with a #15 scalpel blade.  Given the location of the defect and the proximity to free margins a trilobed flap was deemed most appropriate.  Using a sterile surgical marker, an appropriate trilobed flap drawn around the defect.    The area thus outlined was incised deep to adipose tissue with a #15 scalpel blade.  The skin margins were undermined to an appropriate distance in all directions utilizing iris scissors.
Complex Repair And Bilobe Flap Text: The defect edges were debeveled with a #15 scalpel blade.  The primary defect was closed partially with a complex linear closure.  Given the location of the remaining defect, shape of the defect and the proximity to free margins a bilobe flap was deemed most appropriate for complete closure of the defect.  Using a sterile surgical marker, an appropriate advancement flap was drawn incorporating the defect and placing the expected incisions within the relaxed skin tension lines where possible.    The area thus outlined was incised deep to adipose tissue with a #15 scalpel blade.  The skin margins were undermined to an appropriate distance in all directions utilizing iris scissors.
Mercedes Flap Text: The defect edges were debeveled with a #15 scalpel blade.  Given the location of the defect, shape of the defect and the proximity to free margins a Mercedes flap was deemed most appropriate.  Using a sterile surgical marker, an appropriate advancement flap was drawn incorporating the defect and placing the expected incisions within the relaxed skin tension lines where possible. The area thus outlined was incised deep to adipose tissue with a #15 scalpel blade.  The skin margins were undermined to an appropriate distance in all directions utilizing iris scissors.
Lab Facility: 
Additional Anesthesia Volume In Cc: 6
Surgeon (Optional): Ayesha
Referring Physician (Optional): Schoening
Lab: [id number]
Curvilinear Excision Additional Text (Leave Blank If You Do Not Want): The margin was drawn around the clinically apparent lesion.  A curvilinear shape was then drawn on the skin incorporating the lesion and margins.  Incisions were then made along these lines to the appropriate tissue plane and the lesion was extirpated.
Melolabial Interpolation Flap Text: A decision was made to reconstruct the defect utilizing an interpolation axial flap and a staged reconstruction.  A telfa template was made of the defect.  This telfa template was then used to outline the melolabial interpolation flap.  The donor area for the pedicle flap was then injected with anesthesia.  The flap was excised through the skin and subcutaneous tissue down to the layer of the underlying musculature.  The pedicle flap was carefully excised within this deep plane to maintain its blood supply.  The edges of the donor site were undermined.   The donor site was closed in a primary fashion.  The pedicle was then rotated into position and sutured.  Once the tube was sutured into place, adequate blood supply was confirmed with blanching and refill.  The pedicle was then wrapped with xeroform gauze and dressed appropriately with a telfa and gauze bandage to ensure continued blood supply and protect the attached pedicle.
Graft Donor Site Bandage (Optional-Leave Blank If You Don't Want In Note): Steri-strips and a pressure bandage were applied to the donor site.
Fusiform Excision Additional Text (Leave Blank If You Do Not Want): The margin was drawn around the clinically apparent lesion.  A fusiform shape was then drawn on the skin incorporating the lesion and margins.  Incisions were then made along these lines to the appropriate tissue plane and the lesion was extirpated.
Mastoid Interpolation Flap Text: A decision was made to reconstruct the defect utilizing an interpolation axial flap and a staged reconstruction.  A telfa template was made of the defect.  This telfa template was then used to outline the mastoid interpolation flap.  The donor area for the pedicle flap was then injected with anesthesia.  The flap was excised through the skin and subcutaneous tissue down to the layer of the underlying musculature.  The pedicle flap was carefully excised within this deep plane to maintain its blood supply.  The edges of the donor site were undermined.   The donor site was closed in a primary fashion.  The pedicle was then rotated into position and sutured.  Once the tube was sutured into place, adequate blood supply was confirmed with blanching and refill.  The pedicle was then wrapped with xeroform gauze and dressed appropriately with a telfa and gauze bandage to ensure continued blood supply and protect the attached pedicle.
Dorsal Nasal Flap Text: The defect edges were debeveled with a #15 scalpel blade.  Given the location of the defect and the proximity to free margins a dorsal nasal flap was deemed most appropriate.  Using a sterile surgical marker, an appropriate dorsal nasal flap was drawn around the defect.    The area thus outlined was incised deep to adipose tissue with a #15 scalpel blade.  The skin margins were undermined to an appropriate distance in all directions utilizing iris scissors.
Excision Depth: adipose tissue
Split-Thickness Skin Graft Text: The defect edges were debeveled with a #15 scalpel blade.  Given the location of the defect, shape of the defect and the proximity to free margins a split thickness skin graft was deemed most appropriate.  Using a sterile surgical marker, the primary defect shape was transferred to the donor site. The split thickness graft was then harvested.  The skin graft was then placed in the primary defect and oriented appropriately.
Modified Advancement Flap Text: The defect edges were debeveled with a #15 scalpel blade.  Given the location of the defect, shape of the defect and the proximity to free margins a modified advancement flap was deemed most appropriate.  Using a sterile surgical marker, an appropriate advancement flap was drawn incorporating the defect and placing the expected incisions within the relaxed skin tension lines where possible.    The area thus outlined was incised deep to adipose tissue with a #15 scalpel blade.  The skin margins were undermined to an appropriate distance in all directions utilizing iris scissors.
Complex Repair And Melolabial Flap Text: The defect edges were debeveled with a #15 scalpel blade.  The primary defect was closed partially with a complex linear closure.  Given the location of the remaining defect, shape of the defect and the proximity to free margins a melolabial flap was deemed most appropriate for complete closure of the defect.  Using a sterile surgical marker, an appropriate advancement flap was drawn incorporating the defect and placing the expected incisions within the relaxed skin tension lines where possible.    The area thus outlined was incised deep to adipose tissue with a #15 scalpel blade.  The skin margins were undermined to an appropriate distance in all directions utilizing iris scissors.
Deep Sutures: 2-0 Vicryl
Hatchet Flap Text: The defect edges were debeveled with a #15 scalpel blade.  Given the location of the defect, shape of the defect and the proximity to free margins a hatchet flap was deemed most appropriate.  Using a sterile surgical marker, an appropriate hatchet flap was drawn incorporating the defect and placing the expected incisions within the relaxed skin tension lines where possible.    The area thus outlined was incised deep to adipose tissue with a #15 scalpel blade.  The skin margins were undermined to an appropriate distance in all directions utilizing iris scissors.
Paramedian Forehead Flap Text: A decision was made to reconstruct the defect utilizing an interpolation axial flap and a staged reconstruction.  A telfa template was made of the defect.  This telfa template was then used to outline the paramedian forehead pedicle flap.  The donor area for the pedicle flap was then injected with anesthesia.  The flap was excised through the skin and subcutaneous tissue down to the layer of the underlying musculature.  The pedicle flap was carefully excised within this deep plane to maintain its blood supply.  The edges of the donor site were undermined.   The donor site was closed in a primary fashion.  The pedicle was then rotated into position and sutured.  Once the tube was sutured into place, adequate blood supply was confirmed with blanching and refill.  The pedicle was then wrapped with xeroform gauze and dressed appropriately with a telfa and gauze bandage to ensure continued blood supply and protect the attached pedicle.
Saucerization Excision Additional Text (Leave Blank If You Do Not Want): The margin was drawn around the clinically apparent lesion.  Incisions were then made along these lines, in a tangential fashion, to the appropriate tissue plane and the lesion was extirpated.
Composite Graft Text: The defect edges were debeveled with a #15 scalpel blade.  Given the location of the defect, shape of the defect, the proximity to free margins and the fact the defect was full thickness a composite graft was deemed most appropriate.  The defect was outline and then transferred to the donor site.  A full thickness graft was then excised from the donor site. The graft was then placed in the primary defect, oriented appropriately and then sutured into place.  The secondary defect was then repaired using a primary closure.
Mucosal Advancement Flap Text: Given the location of the defect, shape of the defect and the proximity to free margins a mucosal advancement flap was deemed most appropriate. Incisions were made with a 15 blade scalpel in the appropriate fashion along the cutaneous vermilion border and the mucosal lip. The remaining actinically damaged mucosal tissue was excised.  The mucosal advancement flap was then elevated to the gingival sulcus with care taken to preserve the neurovascular structures and advanced into the primary defect. Care was taken to ensure that precise realignment of the vermilion border was achieved.
Cartilage Graft Text: The defect edges were debeveled with a #15 scalpel blade.  Given the location of the defect, shape of the defect, the fact the defect involved a full thickness cartilage defect a cartilage graft was deemed most appropriate.  An appropriate donor site was identified, cleansed, and anesthetized. The cartilage graft was then harvested and transferred to the recipient site, oriented appropriately and then sutured into place.  The secondary defect was then repaired using a primary closure.
Posterior Auricular Interpolation Flap Text: A decision was made to reconstruct the defect utilizing an interpolation axial flap and a staged reconstruction.  A telfa template was made of the defect.  This telfa template was then used to outline the posterior auricular interpolation flap.  The donor area for the pedicle flap was then injected with anesthesia.  The flap was excised through the skin and subcutaneous tissue down to the layer of the underlying musculature.  The pedicle flap was carefully excised within this deep plane to maintain its blood supply.  The edges of the donor site were undermined.   The donor site was closed in a primary fashion.  The pedicle was then rotated into position and sutured.  Once the tube was sutured into place, adequate blood supply was confirmed with blanching and refill.  The pedicle was then wrapped with xeroform gauze and dressed appropriately with a telfa and gauze bandage to ensure continued blood supply and protect the attached pedicle.
Elliptical Excision Additional Text (Leave Blank If You Do Not Want): The margin was drawn around the clinically apparent lesion.  An elliptical shape was then drawn on the skin incorporating the lesion and margins.  Incisions were then made along these lines to the appropriate tissue plane and the lesion was extirpated.
Consent was obtained from the patient. The risks and benefits to therapy were discussed in detail. Specifically, the risks of infection, scarring, bleeding, prolonged wound healing, incomplete removal, allergy to anesthesia, nerve injury and recurrence were addressed. Prior to the procedure, the treatment site was clearly identified and confirmed by the patient. All components of Universal Protocol/PAUSE Rule completed.
Complex Repair And Rotation Flap Text: The defect edges were debeveled with a #15 scalpel blade.  The primary defect was closed partially with a complex linear closure.  Given the location of the remaining defect, shape of the defect and the proximity to free margins a rotation flap was deemed most appropriate for complete closure of the defect.  Using a sterile surgical marker, an appropriate advancement flap was drawn incorporating the defect and placing the expected incisions within the relaxed skin tension lines where possible.    The area thus outlined was incised deep to adipose tissue with a #15 scalpel blade.  The skin margins were undermined to an appropriate distance in all directions utilizing iris scissors.
Island Pedicle Flap Text: The defect edges were debeveled with a #15 scalpel blade.  Given the location of the defect, shape of the defect and the proximity to free margins an island pedicle advancement flap was deemed most appropriate.  Using a sterile surgical marker, an appropriate advancement flap was drawn incorporating the defect, outlining the appropriate donor tissue and placing the expected incisions within the relaxed skin tension lines where possible.    The area thus outlined was incised deep to adipose tissue with a #15 scalpel blade.  The skin margins were undermined to an appropriate distance in all directions around the primary defect and laterally outward around the island pedicle utilizing iris scissors.  There was minimal undermining beneath the pedicle flap.
Dressing: dry sterile dressing
Slit Excision Additional Text (Leave Blank If You Do Not Want): A linear line was drawn on the skin overlying the lesion. An incision was made slowly until the lesion was visualized.  Once visualized, the lesion was removed with blunt dissection.
Lip Wedge Excision Repair Text: Given the location of the defect and the proximity to free margins a full thickness wedge repair was deemed most appropriate.  Using a sterile surgical marker, the appropriate repair was drawn incorporating the defect and placing the expected incisions perpendicular to the vermilion border.  The vermilion border was also meticulously outlined to ensure appropriate reapproximation during the repair.  The area thus outlined was incised through and through with a #15 scalpel blade.  The muscularis and dermis were reaproximated with deep sutures following hemostasis. Care was taken to realign the vermilion border before proceeding with the superficial closure.  Once the vermilion was realigned the superfical and mucosal closure was finished.
Alar Island Pedicle Flap Text: The defect edges were debeveled with a #15 scalpel blade.  Given the location of the defect, shape of the defect and the proximity to the alar rim an island pedicle advancement flap was deemed most appropriate.  Using a sterile surgical marker, an appropriate advancement flap was drawn incorporating the defect, outlining the appropriate donor tissue and placing the expected incisions within the nasal ala running parallel to the alar rim. The area thus outlined was incised with a #15 scalpel blade.  The skin margins were undermined minimally to an appropriate distance in all directions around the primary defect and laterally outward around the island pedicle utilizing iris scissors.  There was minimal undermining beneath the pedicle flap.
Epidermal Autograft Text: The defect edges were debeveled with a #15 scalpel blade.  Given the location of the defect, shape of the defect and the proximity to free margins an epidermal autograft was deemed most appropriate.  Using a sterile surgical marker, the primary defect shape was transferred to the donor site. The epidermal graft was then harvested.  The skin graft was then placed in the primary defect and oriented appropriately.
Complex Repair And Transposition Flap Text: The defect edges were debeveled with a #15 scalpel blade.  The primary defect was closed partially with a complex linear closure.  Given the location of the remaining defect, shape of the defect and the proximity to free margins a transposition flap was deemed most appropriate for complete closure of the defect.  Using a sterile surgical marker, an appropriate advancement flap was drawn incorporating the defect and placing the expected incisions within the relaxed skin tension lines where possible.    The area thus outlined was incised deep to adipose tissue with a #15 scalpel blade.  The skin margins were undermined to an appropriate distance in all directions utilizing iris scissors.
Rotation Flap Text: The defect edges were debeveled with a #15 scalpel blade.  Given the location of the defect, shape of the defect and the proximity to free margins a rotation flap was deemed most appropriate.  Using a sterile surgical marker, an appropriate rotation flap was drawn incorporating the defect and placing the expected incisions within the relaxed skin tension lines where possible.    The area thus outlined was incised deep to adipose tissue with a #15 scalpel blade.  The skin margins were undermined to an appropriate distance in all directions utilizing iris scissors.
Scalpel Size: 15 blade
Complex Repair And Rhombic Flap Text: The defect edges were debeveled with a #15 scalpel blade.  The primary defect was closed partially with a complex linear closure.  Given the location of the remaining defect, shape of the defect and the proximity to free margins a rhombic flap was deemed most appropriate for complete closure of the defect.  Using a sterile surgical marker, an appropriate advancement flap was drawn incorporating the defect and placing the expected incisions within the relaxed skin tension lines where possible.    The area thus outlined was incised deep to adipose tissue with a #15 scalpel blade.  The skin margins were undermined to an appropriate distance in all directions utilizing iris scissors.
Island Pedicle Flap With Canthal Suspension Text: The defect edges were debeveled with a #15 scalpel blade.  Given the location of the defect, shape of the defect and the proximity to free margins an island pedicle advancement flap was deemed most appropriate.  Using a sterile surgical marker, an appropriate advancement flap was drawn incorporating the defect, outlining the appropriate donor tissue and placing the expected incisions within the relaxed skin tension lines where possible. The area thus outlined was incised deep to adipose tissue with a #15 scalpel blade.  The skin margins were undermined to an appropriate distance in all directions around the primary defect and laterally outward around the island pedicle utilizing iris scissors.  There was minimal undermining beneath the pedicle flap. A suspension suture was placed in the canthal tendon to prevent tension and prevent ectropion.
Hemostasis: Electrocautery
Spiral Flap Text: The defect edges were debeveled with a #15 scalpel blade.  Given the location of the defect, shape of the defect and the proximity to free margins a spiral flap was deemed most appropriate.  Using a sterile surgical marker, an appropriate rotation flap was drawn incorporating the defect and placing the expected incisions within the relaxed skin tension lines where possible. The area thus outlined was incised deep to adipose tissue with a #15 scalpel blade.  The skin margins were undermined to an appropriate distance in all directions utilizing iris scissors.
Dermal Autograft Text: The defect edges were debeveled with a #15 scalpel blade.  Given the location of the defect, shape of the defect and the proximity to free margins a dermal autograft was deemed most appropriate.  Using a sterile surgical marker, the primary defect shape was transferred to the donor site. The area thus outlined was incised deep to adipose tissue with a #15 scalpel blade.  The harvested graft was then trimmed of adipose and epidermal tissue until only dermis was left.  The skin graft was then placed in the primary defect and oriented appropriately.
Excisional Biopsy Additional Text (Leave Blank If You Do Not Want): The margin was drawn around the clinically apparent lesion. An elliptical shape was then drawn on the skin incorporating the lesion and margins.  Incisions were then made along these lines to the appropriate tissue plane and the lesion was extirpated.
Post-Care Instructions: I reviewed with the patient in detail post-care instructions:\\n1. Apply bacitracin over the steri-strips.  \\n2. Cut non-stick pad (Telfa) to cover the steri-strips\\n3. Apply tape (hypafix) over the non-stick pad\\n4. Change once per day for 5 days\\n5. Shower with bandage on, change bandage after shower\\n\\nPatient is not to engage in any heavy lifting, exercise, hot tub, or swimming for the next 14 days. Should the patient develop any fevers, chills, bleeding, severe pain patient will contact the office immediately.
Ftsg Text: The defect edges were debeveled with a #15 scalpel blade.  Given the location of the defect, shape of the defect and the proximity to free margins a full thickness skin graft was deemed most appropriate.  Using a sterile surgical marker, the primary defect shape was transferred to the donor site. The area thus outlined was incised deep to adipose tissue with a #15 scalpel blade.  The harvested graft was then trimmed of adipose tissue until only dermis and epidermis was left.  The skin margins of the secondary defect were undermined to an appropriate distance in all directions utilizing iris scissors.  The secondary defect was closed with interrupted buried subcutaneous sutures.  The skin edges were then re-apposed with running  sutures.  The skin graft was then placed in the primary defect and oriented appropriately.
Estimated Blood Loss (Cc): minimal
Complex Repair And V-Y Plasty Text: The defect edges were debeveled with a #15 scalpel blade.  The primary defect was closed partially with a complex linear closure.  Given the location of the remaining defect, shape of the defect and the proximity to free margins a V-Y plasty was deemed most appropriate for complete closure of the defect.  Using a sterile surgical marker, an appropriate advancement flap was drawn incorporating the defect and placing the expected incisions within the relaxed skin tension lines where possible.    The area thus outlined was incised deep to adipose tissue with a #15 scalpel blade.  The skin margins were undermined to an appropriate distance in all directions utilizing iris scissors.
Double Island Pedicle Flap Text: The defect edges were debeveled with a #15 scalpel blade.  Given the location of the defect, shape of the defect and the proximity to free margins a double island pedicle advancement flap was deemed most appropriate.  Using a sterile surgical marker, an appropriate advancement flap was drawn incorporating the defect, outlining the appropriate donor tissue and placing the expected incisions within the relaxed skin tension lines where possible.    The area thus outlined was incised deep to adipose tissue with a #15 scalpel blade.  The skin margins were undermined to an appropriate distance in all directions around the primary defect and laterally outward around the island pedicle utilizing iris scissors.  There was minimal undermining beneath the pedicle flap.
Perilesional Excision Additional Text (Leave Blank If You Do Not Want): The margin was drawn around the clinically apparent lesion. Incisions were then made along these lines to the appropriate tissue plane and the lesion was extirpated.
Complex Repair And M Plasty Text: The defect edges were debeveled with a #15 scalpel blade.  The primary defect was closed partially with a complex linear closure.  Given the location of the remaining defect, shape of the defect and the proximity to free margins an M plasty was deemed most appropriate for complete closure of the defect.  Using a sterile surgical marker, an appropriate advancement flap was drawn incorporating the defect and placing the expected incisions within the relaxed skin tension lines where possible.    The area thus outlined was incised deep to adipose tissue with a #15 scalpel blade.  The skin margins were undermined to an appropriate distance in all directions utilizing iris scissors.
Island Pedicle Flap-Requiring Vessel Identification Text: The defect edges were debeveled with a #15 scalpel blade.  Given the location of the defect, shape of the defect and the proximity to free margins an island pedicle advancement flap was deemed most appropriate.  Using a sterile surgical marker, an appropriate advancement flap was drawn, based on the axial vessel mentioned above, incorporating the defect, outlining the appropriate donor tissue and placing the expected incisions within the relaxed skin tension lines where possible.    The area thus outlined was incised deep to adipose tissue with a #15 scalpel blade.  The skin margins were undermined to an appropriate distance in all directions around the primary defect and laterally outward around the island pedicle utilizing iris scissors.  There was minimal undermining beneath the pedicle flap.
Skin Substitute Text: The defect edges were debeveled with a #15 scalpel blade.  Given the location of the defect, shape of the defect and the proximity to free margins a skin substitute graft was deemed most appropriate.  The graft material was trimmed to fit the size of the defect. The graft was then placed in the primary defect and oriented appropriately.
Previous Accession (Optional): [id number]
Star Wedge Flap Text: The defect edges were debeveled with a #15 scalpel blade.  Given the location of the defect, shape of the defect and the proximity to free margins a star wedge flap was deemed most appropriate.  Using a sterile surgical marker, an appropriate rotation flap was drawn incorporating the defect and placing the expected incisions within the relaxed skin tension lines where possible. The area thus outlined was incised deep to adipose tissue with a #15 scalpel blade.  The skin margins were undermined to an appropriate distance in all directions utilizing iris scissors.
Home Suture Removal Text: Patient was provided a home suture removal kit and will remove their sutures at home.  If they have any questions or difficulties they will call the office.
Tissue Cultured Epidermal Autograft Text: The defect edges were debeveled with a #15 scalpel blade.  Given the location of the defect, shape of the defect and the proximity to free margins a tissue cultured epidermal autograft was deemed most appropriate.  The graft was then trimmed to fit the size of the defect.  The graft was then placed in the primary defect and oriented appropriately.
Positioning (Leave Blank If You Do Not Want): The patient was placed in a comfortable position exposing the surgical site.
Keystone Flap Text: The defect edges were debeveled with a #15 scalpel blade.  Given the location of the defect, shape of the defect a keystone flap was deemed most appropriate.  Using a sterile surgical marker, an appropriate keystone flap was drawn incorporating the defect, outlining the appropriate donor tissue and placing the expected incisions within the relaxed skin tension lines where possible. The area thus outlined was incised deep to adipose tissue with a #15 scalpel blade.  The skin margins were undermined to an appropriate distance in all directions around the primary defect and laterally outward around the flap utilizing iris scissors.
Transposition Flap Text: The defect edges were debeveled with a #15 scalpel blade.  Given the location of the defect and the proximity to free margins a transposition flap was deemed most appropriate.  Using a sterile surgical marker, an appropriate transposition flap was drawn incorporating the defect.    The area thus outlined was incised deep to adipose tissue with a #15 scalpel blade.  The skin margins were undermined to an appropriate distance in all directions utilizing iris scissors.
Dermal Closure: buried vertical mattress
Complex Repair And Double M Plasty Text: The defect edges were debeveled with a #15 scalpel blade.  The primary defect was closed partially with a complex linear closure.  Given the location of the remaining defect, shape of the defect and the proximity to free margins a double M plasty was deemed most appropriate for complete closure of the defect.  Using a sterile surgical marker, an appropriate advancement flap was drawn incorporating the defect and placing the expected incisions within the relaxed skin tension lines where possible.    The area thus outlined was incised deep to adipose tissue with a #15 scalpel blade.  The skin margins were undermined to an appropriate distance in all directions utilizing iris scissors.
Breslow Depth: 0.4
Xenograft Text: The defect edges were debeveled with a #15 scalpel blade.  Given the location of the defect, shape of the defect and the proximity to free margins a xenograft was deemed most appropriate.  The graft was then trimmed to fit the size of the defect.  The graft was then placed in the primary defect and oriented appropriately.
Melolabial Transposition Flap Text: The defect edges were debeveled with a #15 scalpel blade.  Given the location of the defect and the proximity to free margins a melolabial flap was deemed most appropriate.  Using a sterile surgical marker, an appropriate melolabial transposition flap was drawn incorporating the defect.    The area thus outlined was incised deep to adipose tissue with a #15 scalpel blade.  The skin margins were undermined to an appropriate distance in all directions utilizing iris scissors.
Epidermal Sutures: 5-0 Caprosyn
Muscle Hinge Flap Text: The defect edges were debeveled with a #15 scalpel blade.  Given the size, depth and location of the defect and the proximity to free margins a muscle hinge flap was deemed most appropriate.  Using a sterile surgical marker, an appropriate hinge flap was drawn incorporating the defect. The area thus outlined was incised with a #15 scalpel blade.  The skin margins were undermined to an appropriate distance in all directions utilizing iris scissors.
Repair Performed By Another Provider Text (Leave Blank If You Do Not Want): After the tissue was excised the defect was repaired by another provider.
Complex Repair And W Plasty Text: The defect edges were debeveled with a #15 scalpel blade.  The primary defect was closed partially with a complex linear closure.  Given the location of the remaining defect, shape of the defect and the proximity to free margins a W plasty was deemed most appropriate for complete closure of the defect.  Using a sterile surgical marker, an appropriate advancement flap was drawn incorporating the defect and placing the expected incisions within the relaxed skin tension lines where possible.    The area thus outlined was incised deep to adipose tissue with a #15 scalpel blade.  The skin margins were undermined to an appropriate distance in all directions utilizing iris scissors.
Pre-Excision Curettage Text (Leave Blank If You Do Not Want): Prior to drawing the surgical margin the visible lesion was removed with electrodesiccation and curettage to clearly define the lesion size.
O-T Plasty Text: The defect edges were debeveled with a #15 scalpel blade.  Given the location of the defect, shape of the defect and the proximity to free margins an O-T plasty was deemed most appropriate.  Using a sterile surgical marker, an appropriate O-T plasty was drawn incorporating the defect and placing the expected incisions within the relaxed skin tension lines where possible.    The area thus outlined was incised deep to adipose tissue with a #15 scalpel blade.  The skin margins were undermined to an appropriate distance in all directions utilizing iris scissors.
Information: Selecting Yes will display possible errors in your note based on the variables you have selected. This validation is only offered as a suggestion for you. PLEASE NOTE THAT THE VALIDATION TEXT WILL BE REMOVED WHEN YOU FINALIZE YOUR NOTE. IF YOU WANT TO FAX A PRELIMINARY NOTE YOU WILL NEED TO TOGGLE THIS TO 'NO' IF YOU DO NOT WANT IT IN YOUR FAXED NOTE.
Double O-Z Plasty Text: The defect edges were debeveled with a #15 scalpel blade.  Given the location of the defect, shape of the defect and the proximity to free margins a Double O-Z plasty (double transposition flap) was deemed most appropriate.  Using a sterile surgical marker, the appropriate transposition flaps were drawn incorporating the defect and placing the expected incisions within the relaxed skin tension lines where possible. The area thus outlined was incised deep to adipose tissue with a #15 scalpel blade.  The skin margins were undermined to an appropriate distance in all directions utilizing iris scissors.  Hemostasis was achieved with electrocautery.  The flaps were then transposed into place, one clockwise and the other counterclockwise, and anchored with interrupted buried subcutaneous sutures.
Rhombic Flap Text: The defect edges were debeveled with a #15 scalpel blade.  Given the location of the defect and the proximity to free margins a rhombic flap was deemed most appropriate.  Using a sterile surgical marker, an appropriate rhombic flap was drawn incorporating the defect.    The area thus outlined was incised deep to adipose tissue with a #15 scalpel blade.  The skin margins were undermined to an appropriate distance in all directions utilizing iris scissors.
Complex Repair And Dorsal Nasal Flap Text: The defect edges were debeveled with a #15 scalpel blade.  The primary defect was closed partially with a complex linear closure.  Given the location of the remaining defect, shape of the defect and the proximity to free margins a dorsal nasal flap was deemed most appropriate for complete closure of the defect.  Using a sterile surgical marker, an appropriate flap was drawn incorporating the defect and placing the expected incisions within the relaxed skin tension lines where possible.    The area thus outlined was incised deep to adipose tissue with a #15 scalpel blade.  The skin margins were undermined to an appropriate distance in all directions utilizing iris scissors.
Complex Repair And Z Plasty Text: The defect edges were debeveled with a #15 scalpel blade.  The primary defect was closed partially with a complex linear closure.  Given the location of the remaining defect, shape of the defect and the proximity to free margins a Z plasty was deemed most appropriate for complete closure of the defect.  Using a sterile surgical marker, an appropriate advancement flap was drawn incorporating the defect and placing the expected incisions within the relaxed skin tension lines where possible.    The area thus outlined was incised deep to adipose tissue with a #15 scalpel blade.  The skin margins were undermined to an appropriate distance in all directions utilizing iris scissors.
No Repair - Repaired With Adjacent Surgical Defect Text (Leave Blank If You Do Not Want): After the excision the defect was repaired concurrently with another surgical defect which was in close approximation.
Detail Level: Detailed
O-Z Plasty Text: The defect edges were debeveled with a #15 scalpel blade.  Given the location of the defect, shape of the defect and the proximity to free margins an O-Z plasty (double transposition flap) was deemed most appropriate.  Using a sterile surgical marker, the appropriate transposition flaps were drawn incorporating the defect and placing the expected incisions within the relaxed skin tension lines where possible.    The area thus outlined was incised deep to adipose tissue with a #15 scalpel blade.  The skin margins were undermined to an appropriate distance in all directions utilizing iris scissors.  Hemostasis was achieved with electrocautery.  The flaps were then transposed into place, one clockwise and the other counterclockwise, and anchored with interrupted buried subcutaneous sutures.
Purse String (Intermediate) Text: Given the location of the defect and the characteristics of the surrounding skin a purse string intermediate closure was deemed most appropriate.  Undermining was performed circumfirentially around the surgical defect.  A purse string suture was then placed and tightened.
Size Of Lesion In Cm: 3.1
S Plasty Text: Given the location and shape of the defect, and the orientation of relaxed skin tension lines, an S-plasty was deemed most appropriate for repair.  Using a sterile surgical marker, the appropriate outline of the S-plasty was drawn, incorporating the defect and placing the expected incisions within the relaxed skin tension lines where possible.  The area thus outlined was incised deep to adipose tissue with a #15 scalpel blade.  The skin margins were undermined to an appropriate distance in all directions utilizing iris scissors. The skin flaps were advanced over the defect.  The opposing margins were then approximated with interrupted buried subcutaneous sutures.
Rhomboid Transposition Flap Text: The defect edges were debeveled with a #15 scalpel blade.  Given the location of the defect and the proximity to free margins a rhomboid transposition flap was deemed most appropriate.  Using a sterile surgical marker, an appropriate rhomboid flap was drawn incorporating the defect.    The area thus outlined was incised deep to adipose tissue with a #15 scalpel blade.  The skin margins were undermined to an appropriate distance in all directions utilizing iris scissors.
Complex Repair And Ftsg Text: The defect edges were debeveled with a #15 scalpel blade.  The primary defect was closed partially with a complex linear closure.  Given the location of the defect, shape of the defect and the proximity to free margins a full thickness skin graft was deemed most appropriate to repair the remaining defect.  The graft was trimmed to fit the size of the remaining defect.  The graft was then placed in the primary defect, oriented appropriately, and sutured into place.
Advancement Flap (Double) Text: The defect edges were debeveled with a #15 scalpel blade.  Given the location of the defect and the proximity to free margins a double advancement flap was deemed most appropriate.  Using a sterile surgical marker, the appropriate advancement flaps were drawn incorporating the defect and placing the expected incisions within the relaxed skin tension lines where possible.    The area thus outlined was incised deep to adipose tissue with a #15 scalpel blade.  The skin margins were undermined to an appropriate distance in all directions utilizing iris scissors.
Epidermal Closure Graft Donor Site (Optional): simple interrupted
X Size Of Lesion In Cm (Optional): 0.6
Advancement Flap (Single) Text: The defect edges were debeveled with a #15 scalpel blade.  Given the location of the defect and the proximity to free margins a single advancement flap was deemed most appropriate.  Using a sterile surgical marker, an appropriate advancement flap was drawn incorporating the defect and placing the expected incisions within the relaxed skin tension lines where possible.    The area thus outlined was incised deep to adipose tissue with a #15 scalpel blade.  The skin margins were undermined to an appropriate distance in all directions utilizing iris scissors.
Purse String (Simple) Text: Given the location of the defect and the characteristics of the surrounding skin a purse string simple closure was deemed most appropriate.  Undermining was performed circumferentially around the surgical defect.  A purse string suture was then placed and tightened.
Burow's Advancement Flap Text: The defect edges were debeveled with a #15 scalpel blade.  Given the location of the defect and the proximity to free margins a Burow's advancement flap was deemed most appropriate.  Using a sterile surgical marker, the appropriate advancement flap was drawn incorporating the defect and placing the expected incisions within the relaxed skin tension lines where possible.    The area thus outlined was incised deep to adipose tissue with a #15 scalpel blade.  The skin margins were undermined to an appropriate distance in all directions utilizing iris scissors.
Complex Repair And Split-Thickness Skin Graft Text: The defect edges were debeveled with a #15 scalpel blade.  The primary defect was closed partially with a complex linear closure.  Given the location of the defect, shape of the defect and the proximity to free margins a split thickness skin graft was deemed most appropriate to repair the remaining defect.  The graft was trimmed to fit the size of the remaining defect.  The graft was then placed in the primary defect, oriented appropriately, and sutured into place.
V-Y Plasty Text: The defect edges were debeveled with a #15 scalpel blade.  Given the location of the defect, shape of the defect and the proximity to free margins an V-Y advancement flap was deemed most appropriate.  Using a sterile surgical marker, an appropriate advancement flap was drawn incorporating the defect and placing the expected incisions within the relaxed skin tension lines where possible.    The area thus outlined was incised deep to adipose tissue with a #15 scalpel blade.  The skin margins were undermined to an appropriate distance in all directions utilizing iris scissors.
Partial Purse String (Simple) Text: Given the location of the defect and the characteristics of the surrounding skin a simple purse string closure was deemed most appropriate.  Undermining was performed circumferentially around the surgical defect.  A purse string suture was then placed and tightened. Wound tension of the circular defect prevented complete closure of the wound.
Excision Method: Elliptical
Partial Purse String (Intermediate) Text: Given the location of the defect and the characteristics of the surrounding skin an intermediate purse string closure was deemed most appropriate.  Undermining was performed circumferentially around the surgical defect.  A purse string suture was then placed and tightened. Wound tension of the circular defect prevented complete closure of the wound.
Size Of Margin In Cm: 1
Bi-Rhombic Flap Text: The defect edges were debeveled with a #15 scalpel blade.  Given the location of the defect and the proximity to free margins a bi-rhombic flap was deemed most appropriate.  Using a sterile surgical marker, an appropriate rhombic flap was drawn incorporating the defect. The area thus outlined was incised deep to adipose tissue with a #15 scalpel blade.  The skin margins were undermined to an appropriate distance in all directions utilizing iris scissors.

## 2019-03-06 ENCOUNTER — APPOINTMENT (RX ONLY)
Dept: URBAN - METROPOLITAN AREA CLINIC 4 | Facility: CLINIC | Age: 84
Setting detail: DERMATOLOGY
End: 2019-03-06

## 2019-03-06 DIAGNOSIS — Z48.817 ENCOUNTER FOR SURGICAL AFTERCARE FOLLOWING SURGERY ON THE SKIN AND SUBCUTANEOUS TISSUE: ICD-10-CM

## 2019-03-06 PROCEDURE — 99212 OFFICE O/P EST SF 10 MIN: CPT | Mod: 24

## 2019-03-06 PROCEDURE — ? POST-OP WOUND EVALUATION

## 2019-03-06 ASSESSMENT — LOCATION ZONE DERM: LOCATION ZONE: ARM

## 2019-03-06 ASSESSMENT — LOCATION DETAILED DESCRIPTION DERM: LOCATION DETAILED: RIGHT LATERAL PROXIMAL UPPER ARM

## 2019-03-06 ASSESSMENT — LOCATION SIMPLE DESCRIPTION DERM: LOCATION SIMPLE: RIGHT UPPER ARM

## 2019-03-06 NOTE — PROCEDURE: POST-OP WOUND EVALUATION
Wound Evaluated By (Optional): Dr. Hodge
Detail Level: Detailed
Wound Color?: pink
Sutures?: intact
Wound Bruising?: mild
Follow Up Units (Optional): 0

## 2019-03-15 ENCOUNTER — APPOINTMENT (RX ONLY)
Dept: URBAN - METROPOLITAN AREA CLINIC 4 | Facility: CLINIC | Age: 84
Setting detail: DERMATOLOGY
End: 2019-03-15

## 2019-03-15 PROBLEM — C44.622 SQUAMOUS CELL CARCINOMA OF SKIN OF RIGHT UPPER LIMB, INCLUDING SHOULDER: Status: ACTIVE | Noted: 2019-03-15

## 2019-03-15 PROCEDURE — 11603 EXC TR-EXT MAL+MARG 2.1-3 CM: CPT | Mod: 79

## 2019-03-15 PROCEDURE — ? EXCISION

## 2019-03-15 PROCEDURE — 13121 CMPLX RPR S/A/L 2.6-7.5 CM: CPT | Mod: 79

## 2019-03-15 NOTE — PROCEDURE: EXCISION
Bilateral Helical Rim Advancement Flap Text: The defect edges were debeveled with a #15 blade scalpel.  Given the location of the defect and the proximity to free margins (helical rim) a bilateral helical rim advancement flap was deemed most appropriate.  Using a sterile surgical marker, the appropriate advancement flaps were drawn incorporating the defect and placing the expected incisions between the helical rim and antihelix where possible.  The area thus outlined was incised through and through with a #15 scalpel blade.  With a skin hook and iris scissors, the flaps were gently and sharply undermined and freed up.
W Plasty Text: The lesion was extirpated to the level of the fat with a #15 scalpel blade.  Given the location of the defect, shape of the defect and the proximity to free margins a W-plasty was deemed most appropriate for repair.  Using a sterile surgical marker, the appropriate transposition arms of the W-plasty were drawn incorporating the defect and placing the expected incisions within the relaxed skin tension lines where possible.    The area thus outlined was incised deep to adipose tissue with a #15 scalpel blade.  The skin margins were undermined to an appropriate distance in all directions utilizing iris scissors.  The opposing transposition arms were then transposed into place in opposite direction and anchored with interrupted buried subcutaneous sutures.
Complex Repair And Split-Thickness Skin Graft Text: The defect edges were debeveled with a #15 scalpel blade.  The primary defect was closed partially with a complex linear closure.  Given the location of the defect, shape of the defect and the proximity to free margins a split thickness skin graft was deemed most appropriate to repair the remaining defect.  The graft was trimmed to fit the size of the remaining defect.  The graft was then placed in the primary defect, oriented appropriately, and sutured into place.
Double M-Plasty Complex Repair Preamble Text (Leave Blank If You Do Not Want): Extensive wide undermining was performed.
Billing Type: Third-Party Bill
Show Curettage Variables: Yes
A-T Advancement Flap Text: The defect edges were debeveled with a #15 scalpel blade.  Given the location of the defect, shape of the defect and the proximity to free margins an A-T advancement flap was deemed most appropriate.  Using a sterile surgical marker, an appropriate advancement flap was drawn incorporating the defect and placing the expected incisions within the relaxed skin tension lines where possible.    The area thus outlined was incised deep to adipose tissue with a #15 scalpel blade.  The skin margins were undermined to an appropriate distance in all directions utilizing iris scissors.
Partial Purse String (Simple) Text: Given the location of the defect and the characteristics of the surrounding skin a simple purse string closure was deemed most appropriate.  Undermining was performed circumferentially around the surgical defect.  A purse string suture was then placed and tightened. Wound tension of the circular defect prevented complete closure of the wound.
Epidermal Closure: running subcuticular
Ear Star Wedge Flap Text: The defect edges were debeveled with a #15 blade scalpel.  Given the location of the defect and the proximity to free margins (helical rim) an ear star wedge flap was deemed most appropriate.  Using a sterile surgical marker, the appropriate flap was drawn incorporating the defect and placing the expected incisions between the helical rim and antihelix where possible.  The area thus outlined was incised through and through with a #15 scalpel blade.
Complex Repair And Ftsg Text: The defect edges were debeveled with a #15 scalpel blade.  The primary defect was closed partially with a complex linear closure.  Given the location of the defect, shape of the defect and the proximity to free margins a full thickness skin graft was deemed most appropriate to repair the remaining defect.  The graft was trimmed to fit the size of the remaining defect.  The graft was then placed in the primary defect, oriented appropriately, and sutured into place.
Excision Method: Elliptical
Crescentic Intermediate Repair Preamble Text (Leave Blank If You Do Not Want): Undermining was performed with blunt dissection.
Partial Purse String (Intermediate) Text: Given the location of the defect and the characteristics of the surrounding skin an intermediate purse string closure was deemed most appropriate.  Undermining was performed circumferentially around the surgical defect.  A purse string suture was then placed and tightened. Wound tension of the circular defect prevented complete closure of the wound.
Anesthesia Type: 1% lidocaine with epinephrine
O-T Advancement Flap Text: The defect edges were debeveled with a #15 scalpel blade.  Given the location of the defect, shape of the defect and the proximity to free margins an O-T advancement flap was deemed most appropriate.  Using a sterile surgical marker, an appropriate advancement flap was drawn incorporating the defect and placing the expected incisions within the relaxed skin tension lines where possible.    The area thus outlined was incised deep to adipose tissue with a #15 scalpel blade.  The skin margins were undermined to an appropriate distance in all directions utilizing iris scissors.
Z Plasty Text: The lesion was extirpated to the level of the fat with a #15 scalpel blade.  Given the location of the defect, shape of the defect and the proximity to free margins a Z-plasty was deemed most appropriate for repair.  Using a sterile surgical marker, the appropriate transposition arms of the Z-plasty were drawn incorporating the defect and placing the expected incisions within the relaxed skin tension lines where possible.    The area thus outlined was incised deep to adipose tissue with a #15 scalpel blade.  The skin margins were undermined to an appropriate distance in all directions utilizing iris scissors.  The opposing transposition arms were then transposed into place in opposite direction and anchored with interrupted buried subcutaneous sutures.
Bill [id number] For Specimen Handling/Conveyance To Laboratory?: no
Complex Repair And Dorsal Nasal Flap Text: The defect edges were debeveled with a #15 scalpel blade.  The primary defect was closed partially with a complex linear closure.  Given the location of the remaining defect, shape of the defect and the proximity to free margins a dorsal nasal flap was deemed most appropriate for complete closure of the defect.  Using a sterile surgical marker, an appropriate flap was drawn incorporating the defect and placing the expected incisions within the relaxed skin tension lines where possible.    The area thus outlined was incised deep to adipose tissue with a #15 scalpel blade.  The skin margins were undermined to an appropriate distance in all directions utilizing iris scissors.
Size Of Margin In Cm: 0.2
Banner Transposition Flap Text: The defect edges were debeveled with a #15 scalpel blade.  Given the location of the defect and the proximity to free margins a Banner transposition flap was deemed most appropriate.  Using a sterile surgical marker, an appropriate flap drawn around the defect. The area thus outlined was incised deep to adipose tissue with a #15 scalpel blade.  The skin margins were undermined to an appropriate distance in all directions utilizing iris scissors.
Purse String (Simple) Text: Given the location of the defect and the characteristics of the surrounding skin a purse string simple closure was deemed most appropriate.  Undermining was performed circumferentially around the surgical defect.  A purse string suture was then placed and tightened.
O-L Flap Text: The defect edges were debeveled with a #15 scalpel blade.  Given the location of the defect, shape of the defect and the proximity to free margins an O-L flap was deemed most appropriate.  Using a sterile surgical marker, an appropriate advancement flap was drawn incorporating the defect and placing the expected incisions within the relaxed skin tension lines where possible.    The area thus outlined was incised deep to adipose tissue with a #15 scalpel blade.  The skin margins were undermined to an appropriate distance in all directions utilizing iris scissors.
Cheek Interpolation Flap Text: A decision was made to reconstruct the defect utilizing an interpolation axial flap and a staged reconstruction.  A telfa template was made of the defect.  This telfa template was then used to outline the Cheek Interpolation flap.  The donor area for the pedicle flap was then injected with anesthesia.  The flap was excised through the skin and subcutaneous tissue down to the layer of the underlying musculature.  The interpolation flap was carefully excised within this deep plane to maintain its blood supply.  The edges of the donor site were undermined.   The donor site was closed in a primary fashion.  The pedicle was then rotated into position and sutured.  Once the tube was sutured into place, adequate blood supply was confirmed with blanching and refill.  The pedicle was then wrapped with xeroform gauze and dressed appropriately with a telfa and gauze bandage to ensure continued blood supply and protect the attached pedicle.
Complex Repair And Z Plasty Text: The defect edges were debeveled with a #15 scalpel blade.  The primary defect was closed partially with a complex linear closure.  Given the location of the remaining defect, shape of the defect and the proximity to free margins a Z plasty was deemed most appropriate for complete closure of the defect.  Using a sterile surgical marker, an appropriate advancement flap was drawn incorporating the defect and placing the expected incisions within the relaxed skin tension lines where possible.    The area thus outlined was incised deep to adipose tissue with a #15 scalpel blade.  The skin margins were undermined to an appropriate distance in all directions utilizing iris scissors.
X Size Of Lesion In Cm (Optional): 0
Bilobed Flap Text: The defect edges were debeveled with a #15 scalpel blade.  Given the location of the defect and the proximity to free margins a bilobe flap was deemed most appropriate.  Using a sterile surgical marker, an appropriate bilobe flap drawn around the defect.    The area thus outlined was incised deep to adipose tissue with a #15 scalpel blade.  The skin margins were undermined to an appropriate distance in all directions utilizing iris scissors.
V-Y Flap Text: The defect edges were debeveled with a #15 scalpel blade.  Given the location of the defect, shape of the defect and the proximity to free margins a V-Y flap was deemed most appropriate.  Using a sterile surgical marker, an appropriate advancement flap was drawn incorporating the defect and placing the expected incisions within the relaxed skin tension lines where possible.    The area thus outlined was incised deep to adipose tissue with a #15 scalpel blade.  The skin margins were undermined to an appropriate distance in all directions utilizing iris scissors.
Purse String (Intermediate) Text: Given the location of the defect and the characteristics of the surrounding skin a purse string intermediate closure was deemed most appropriate.  Undermining was performed circumfirentially around the surgical defect.  A purse string suture was then placed and tightened.
Anesthesia Volume In Cc: 12
Cheek-To-Nose Interpolation Flap Text: A decision was made to reconstruct the defect utilizing an interpolation axial flap and a staged reconstruction.  A telfa template was made of the defect.  This telfa template was then used to outline the Cheek-To-Nose Interpolation flap.  The donor area for the pedicle flap was then injected with anesthesia.  The flap was excised through the skin and subcutaneous tissue down to the layer of the underlying musculature.  The interpolation flap was carefully excised within this deep plane to maintain its blood supply.  The edges of the donor site were undermined.   The donor site was closed in a primary fashion.  The pedicle was then rotated into position and sutured.  Once the tube was sutured into place, adequate blood supply was confirmed with blanching and refill.  The pedicle was then wrapped with xeroform gauze and dressed appropriately with a telfa and gauze bandage to ensure continued blood supply and protect the attached pedicle.
Complex Repair And W Plasty Text: The defect edges were debeveled with a #15 scalpel blade.  The primary defect was closed partially with a complex linear closure.  Given the location of the remaining defect, shape of the defect and the proximity to free margins a W plasty was deemed most appropriate for complete closure of the defect.  Using a sterile surgical marker, an appropriate advancement flap was drawn incorporating the defect and placing the expected incisions within the relaxed skin tension lines where possible.    The area thus outlined was incised deep to adipose tissue with a #15 scalpel blade.  The skin margins were undermined to an appropriate distance in all directions utilizing iris scissors.
Size Of Lesion In Cm: 2.1
Xenograft Text: The defect edges were debeveled with a #15 scalpel blade.  Given the location of the defect, shape of the defect and the proximity to free margins a xenograft was deemed most appropriate.  The graft was then trimmed to fit the size of the defect.  The graft was then placed in the primary defect and oriented appropriately.
Undermining Location (Optional): in the superficial subcutaneous fat
Lab: [id number]
Advancement-Rotation Flap Text: The defect edges were debeveled with a #15 scalpel blade.  Given the location of the defect, shape of the defect and the proximity to free margins an advancement-rotation flap was deemed most appropriate.  Using a sterile surgical marker, an appropriate flap was drawn incorporating the defect and placing the expected incisions within the relaxed skin tension lines where possible. The area thus outlined was incised deep to adipose tissue with a #15 scalpel blade.  The skin margins were undermined to an appropriate distance in all directions utilizing iris scissors.
Interpolation Flap Text: A decision was made to reconstruct the defect utilizing an interpolation axial flap and a staged reconstruction.  A telfa template was made of the defect.  This telfa template was then used to outline the interpolation flap.  The donor area for the pedicle flap was then injected with anesthesia.  The flap was excised through the skin and subcutaneous tissue down to the layer of the underlying musculature.  The interpolation flap was carefully excised within this deep plane to maintain its blood supply.  The edges of the donor site were undermined.   The donor site was closed in a primary fashion.  The pedicle was then rotated into position and sutured.  Once the tube was sutured into place, adequate blood supply was confirmed with blanching and refill.  The pedicle was then wrapped with xeroform gauze and dressed appropriately with a telfa and gauze bandage to ensure continued blood supply and protect the attached pedicle.
Bilobed Transposition Flap Text: The defect edges were debeveled with a #15 scalpel blade.  Given the location of the defect and the proximity to free margins a bilobed transposition flap was deemed most appropriate.  Using a sterile surgical marker, an appropriate bilobe flap drawn around the defect.    The area thus outlined was incised deep to adipose tissue with a #15 scalpel blade.  The skin margins were undermined to an appropriate distance in all directions utilizing iris scissors.
Complex Repair And Double M Plasty Text: The defect edges were debeveled with a #15 scalpel blade.  The primary defect was closed partially with a complex linear closure.  Given the location of the remaining defect, shape of the defect and the proximity to free margins a double M plasty was deemed most appropriate for complete closure of the defect.  Using a sterile surgical marker, an appropriate advancement flap was drawn incorporating the defect and placing the expected incisions within the relaxed skin tension lines where possible.    The area thus outlined was incised deep to adipose tissue with a #15 scalpel blade.  The skin margins were undermined to an appropriate distance in all directions utilizing iris scissors.
Wound Care: Bacitracin
Tissue Cultured Epidermal Autograft Text: The defect edges were debeveled with a #15 scalpel blade.  Given the location of the defect, shape of the defect and the proximity to free margins a tissue cultured epidermal autograft was deemed most appropriate.  The graft was then trimmed to fit the size of the defect.  The graft was then placed in the primary defect and oriented appropriately.
Additional Anesthesia Volume In Cc: 6
Curvilinear Excision Additional Text (Leave Blank If You Do Not Want): The margin was drawn around the clinically apparent lesion.  A curvilinear shape was then drawn on the skin incorporating the lesion and margins.  Incisions were then made along these lines to the appropriate tissue plane and the lesion was extirpated.
Trilobed Flap Text: The defect edges were debeveled with a #15 scalpel blade.  Given the location of the defect and the proximity to free margins a trilobed flap was deemed most appropriate.  Using a sterile surgical marker, an appropriate trilobed flap drawn around the defect.    The area thus outlined was incised deep to adipose tissue with a #15 scalpel blade.  The skin margins were undermined to an appropriate distance in all directions utilizing iris scissors.
Lab Facility: 
Mercedes Flap Text: The defect edges were debeveled with a #15 scalpel blade.  Given the location of the defect, shape of the defect and the proximity to free margins a Mercedes flap was deemed most appropriate.  Using a sterile surgical marker, an appropriate advancement flap was drawn incorporating the defect and placing the expected incisions within the relaxed skin tension lines where possible. The area thus outlined was incised deep to adipose tissue with a #15 scalpel blade.  The skin margins were undermined to an appropriate distance in all directions utilizing iris scissors.
Complex Repair And M Plasty Text: The defect edges were debeveled with a #15 scalpel blade.  The primary defect was closed partially with a complex linear closure.  Given the location of the remaining defect, shape of the defect and the proximity to free margins an M plasty was deemed most appropriate for complete closure of the defect.  Using a sterile surgical marker, an appropriate advancement flap was drawn incorporating the defect and placing the expected incisions within the relaxed skin tension lines where possible.    The area thus outlined was incised deep to adipose tissue with a #15 scalpel blade.  The skin margins were undermined to an appropriate distance in all directions utilizing iris scissors.
Fusiform Excision Additional Text (Leave Blank If You Do Not Want): The margin was drawn around the clinically apparent lesion.  A fusiform shape was then drawn on the skin incorporating the lesion and margins.  Incisions were then made along these lines to the appropriate tissue plane and the lesion was extirpated.
Skin Substitute Text: The defect edges were debeveled with a #15 scalpel blade.  Given the location of the defect, shape of the defect and the proximity to free margins a skin substitute graft was deemed most appropriate.  The graft material was trimmed to fit the size of the defect. The graft was then placed in the primary defect and oriented appropriately.
Deep Sutures: 4-0 Vicryl
Modified Advancement Flap Text: The defect edges were debeveled with a #15 scalpel blade.  Given the location of the defect, shape of the defect and the proximity to free margins a modified advancement flap was deemed most appropriate.  Using a sterile surgical marker, an appropriate advancement flap was drawn incorporating the defect and placing the expected incisions within the relaxed skin tension lines where possible.    The area thus outlined was incised deep to adipose tissue with a #15 scalpel blade.  The skin margins were undermined to an appropriate distance in all directions utilizing iris scissors.
Dorsal Nasal Flap Text: The defect edges were debeveled with a #15 scalpel blade.  Given the location of the defect and the proximity to free margins a dorsal nasal flap was deemed most appropriate.  Using a sterile surgical marker, an appropriate dorsal nasal flap was drawn around the defect.    The area thus outlined was incised deep to adipose tissue with a #15 scalpel blade.  The skin margins were undermined to an appropriate distance in all directions utilizing iris scissors.
Graft Donor Site Bandage (Optional-Leave Blank If You Don't Want In Note): Steri-strips and a pressure bandage were applied to the donor site.
Complex Repair And V-Y Plasty Text: The defect edges were debeveled with a #15 scalpel blade.  The primary defect was closed partially with a complex linear closure.  Given the location of the remaining defect, shape of the defect and the proximity to free margins a V-Y plasty was deemed most appropriate for complete closure of the defect.  Using a sterile surgical marker, an appropriate advancement flap was drawn incorporating the defect and placing the expected incisions within the relaxed skin tension lines where possible.    The area thus outlined was incised deep to adipose tissue with a #15 scalpel blade.  The skin margins were undermined to an appropriate distance in all directions utilizing iris scissors.
Elliptical Excision Additional Text (Leave Blank If You Do Not Want): The margin was drawn around the clinically apparent lesion.  An elliptical shape was then drawn on the skin incorporating the lesion and margins.  Incisions were then made along these lines to the appropriate tissue plane and the lesion was extirpated.
Dermal Autograft Text: The defect edges were debeveled with a #15 scalpel blade.  Given the location of the defect, shape of the defect and the proximity to free margins a dermal autograft was deemed most appropriate.  Using a sterile surgical marker, the primary defect shape was transferred to the donor site. The area thus outlined was incised deep to adipose tissue with a #15 scalpel blade.  The harvested graft was then trimmed of adipose and epidermal tissue until only dermis was left.  The skin graft was then placed in the primary defect and oriented appropriately.
Mucosal Advancement Flap Text: Given the location of the defect, shape of the defect and the proximity to free margins a mucosal advancement flap was deemed most appropriate. Incisions were made with a 15 blade scalpel in the appropriate fashion along the cutaneous vermilion border and the mucosal lip. The remaining actinically damaged mucosal tissue was excised.  The mucosal advancement flap was then elevated to the gingival sulcus with care taken to preserve the neurovascular structures and advanced into the primary defect. Care was taken to ensure that precise realignment of the vermilion border was achieved.
Dressing: dry sterile dressing
Island Pedicle Flap Text: The defect edges were debeveled with a #15 scalpel blade.  Given the location of the defect, shape of the defect and the proximity to free margins an island pedicle advancement flap was deemed most appropriate.  Using a sterile surgical marker, an appropriate advancement flap was drawn incorporating the defect, outlining the appropriate donor tissue and placing the expected incisions within the relaxed skin tension lines where possible.    The area thus outlined was incised deep to adipose tissue with a #15 scalpel blade.  The skin margins were undermined to an appropriate distance in all directions around the primary defect and laterally outward around the island pedicle utilizing iris scissors.  There was minimal undermining beneath the pedicle flap.
Complex Repair And Transposition Flap Text: The defect edges were debeveled with a #15 scalpel blade.  The primary defect was closed partially with a complex linear closure.  Given the location of the remaining defect, shape of the defect and the proximity to free margins a transposition flap was deemed most appropriate for complete closure of the defect.  Using a sterile surgical marker, an appropriate advancement flap was drawn incorporating the defect and placing the expected incisions within the relaxed skin tension lines where possible.    The area thus outlined was incised deep to adipose tissue with a #15 scalpel blade.  The skin margins were undermined to an appropriate distance in all directions utilizing iris scissors.
Saucerization Excision Additional Text (Leave Blank If You Do Not Want): The margin was drawn around the clinically apparent lesion.  Incisions were then made along these lines, in a tangential fashion, to the appropriate tissue plane and the lesion was extirpated.
Previous Accession (Optional): [id number]
Hatchet Flap Text: The defect edges were debeveled with a #15 scalpel blade.  Given the location of the defect, shape of the defect and the proximity to free margins a hatchet flap was deemed most appropriate.  Using a sterile surgical marker, an appropriate hatchet flap was drawn incorporating the defect and placing the expected incisions within the relaxed skin tension lines where possible.    The area thus outlined was incised deep to adipose tissue with a #15 scalpel blade.  The skin margins were undermined to an appropriate distance in all directions utilizing iris scissors.
Hemostasis: Electrocautery
Island Pedicle Flap With Canthal Suspension Text: The defect edges were debeveled with a #15 scalpel blade.  Given the location of the defect, shape of the defect and the proximity to free margins an island pedicle advancement flap was deemed most appropriate.  Using a sterile surgical marker, an appropriate advancement flap was drawn incorporating the defect, outlining the appropriate donor tissue and placing the expected incisions within the relaxed skin tension lines where possible. The area thus outlined was incised deep to adipose tissue with a #15 scalpel blade.  The skin margins were undermined to an appropriate distance in all directions around the primary defect and laterally outward around the island pedicle utilizing iris scissors.  There was minimal undermining beneath the pedicle flap. A suspension suture was placed in the canthal tendon to prevent tension and prevent ectropion.
Epidermal Autograft Text: The defect edges were debeveled with a #15 scalpel blade.  Given the location of the defect, shape of the defect and the proximity to free margins an epidermal autograft was deemed most appropriate.  Using a sterile surgical marker, the primary defect shape was transferred to the donor site. The epidermal graft was then harvested.  The skin graft was then placed in the primary defect and oriented appropriately.
Complex Repair And Rhombic Flap Text: The defect edges were debeveled with a #15 scalpel blade.  The primary defect was closed partially with a complex linear closure.  Given the location of the remaining defect, shape of the defect and the proximity to free margins a rhombic flap was deemed most appropriate for complete closure of the defect.  Using a sterile surgical marker, an appropriate advancement flap was drawn incorporating the defect and placing the expected incisions within the relaxed skin tension lines where possible.    The area thus outlined was incised deep to adipose tissue with a #15 scalpel blade.  The skin margins were undermined to an appropriate distance in all directions utilizing iris scissors.
Slit Excision Additional Text (Leave Blank If You Do Not Want): A linear line was drawn on the skin overlying the lesion. An incision was made slowly until the lesion was visualized.  Once visualized, the lesion was removed with blunt dissection.
Rotation Flap Text: The defect edges were debeveled with a #15 scalpel blade.  Given the location of the defect, shape of the defect and the proximity to free margins a rotation flap was deemed most appropriate.  Using a sterile surgical marker, an appropriate rotation flap was drawn incorporating the defect and placing the expected incisions within the relaxed skin tension lines where possible.    The area thus outlined was incised deep to adipose tissue with a #15 scalpel blade.  The skin margins were undermined to an appropriate distance in all directions utilizing iris scissors.
Alar Island Pedicle Flap Text: The defect edges were debeveled with a #15 scalpel blade.  Given the location of the defect, shape of the defect and the proximity to the alar rim an island pedicle advancement flap was deemed most appropriate.  Using a sterile surgical marker, an appropriate advancement flap was drawn incorporating the defect, outlining the appropriate donor tissue and placing the expected incisions within the nasal ala running parallel to the alar rim. The area thus outlined was incised with a #15 scalpel blade.  The skin margins were undermined minimally to an appropriate distance in all directions around the primary defect and laterally outward around the island pedicle utilizing iris scissors.  There was minimal undermining beneath the pedicle flap.
Composite Graft Text: The defect edges were debeveled with a #15 scalpel blade.  Given the location of the defect, shape of the defect, the proximity to free margins and the fact the defect was full thickness a composite graft was deemed most appropriate.  The defect was outline and then transferred to the donor site.  A full thickness graft was then excised from the donor site. The graft was then placed in the primary defect, oriented appropriately and then sutured into place.  The secondary defect was then repaired using a primary closure.
Estimated Blood Loss (Cc): minimal
Consent was obtained from the patient. The risks and benefits to therapy were discussed in detail. Specifically, the risks of infection, scarring, bleeding, prolonged wound healing, incomplete removal, allergy to anesthesia, nerve injury and recurrence were addressed. Prior to the procedure, the treatment site was clearly identified and confirmed by the patient. All components of Universal Protocol/PAUSE Rule completed.
Excisional Biopsy Additional Text (Leave Blank If You Do Not Want): The margin was drawn around the clinically apparent lesion. An elliptical shape was then drawn on the skin incorporating the lesion and margins.  Incisions were then made along these lines to the appropriate tissue plane and the lesion was extirpated.
Spiral Flap Text: The defect edges were debeveled with a #15 scalpel blade.  Given the location of the defect, shape of the defect and the proximity to free margins a spiral flap was deemed most appropriate.  Using a sterile surgical marker, an appropriate rotation flap was drawn incorporating the defect and placing the expected incisions within the relaxed skin tension lines where possible. The area thus outlined was incised deep to adipose tissue with a #15 scalpel blade.  The skin margins were undermined to an appropriate distance in all directions utilizing iris scissors.
Complex Repair And Rotation Flap Text: The defect edges were debeveled with a #15 scalpel blade.  The primary defect was closed partially with a complex linear closure.  Given the location of the remaining defect, shape of the defect and the proximity to free margins a rotation flap was deemed most appropriate for complete closure of the defect.  Using a sterile surgical marker, an appropriate advancement flap was drawn incorporating the defect and placing the expected incisions within the relaxed skin tension lines where possible.    The area thus outlined was incised deep to adipose tissue with a #15 scalpel blade.  The skin margins were undermined to an appropriate distance in all directions utilizing iris scissors.
Double Island Pedicle Flap Text: The defect edges were debeveled with a #15 scalpel blade.  Given the location of the defect, shape of the defect and the proximity to free margins a double island pedicle advancement flap was deemed most appropriate.  Using a sterile surgical marker, an appropriate advancement flap was drawn incorporating the defect, outlining the appropriate donor tissue and placing the expected incisions within the relaxed skin tension lines where possible.    The area thus outlined was incised deep to adipose tissue with a #15 scalpel blade.  The skin margins were undermined to an appropriate distance in all directions around the primary defect and laterally outward around the island pedicle utilizing iris scissors.  There was minimal undermining beneath the pedicle flap.
Cartilage Graft Text: The defect edges were debeveled with a #15 scalpel blade.  Given the location of the defect, shape of the defect, the fact the defect involved a full thickness cartilage defect a cartilage graft was deemed most appropriate.  An appropriate donor site was identified, cleansed, and anesthetized. The cartilage graft was then harvested and transferred to the recipient site, oriented appropriately and then sutured into place.  The secondary defect was then repaired using a primary closure.
Perilesional Excision Additional Text (Leave Blank If You Do Not Want): The margin was drawn around the clinically apparent lesion. Incisions were then made along these lines to the appropriate tissue plane and the lesion was extirpated.
Star Wedge Flap Text: The defect edges were debeveled with a #15 scalpel blade.  Given the location of the defect, shape of the defect and the proximity to free margins a star wedge flap was deemed most appropriate.  Using a sterile surgical marker, an appropriate rotation flap was drawn incorporating the defect and placing the expected incisions within the relaxed skin tension lines where possible. The area thus outlined was incised deep to adipose tissue with a #15 scalpel blade.  The skin margins were undermined to an appropriate distance in all directions utilizing iris scissors.
Post-Care Instructions: I reviewed with the patient in detail post-care instructions:\\n1. Apply bacitracin over the steri-strips.  \\n2. Cut non-stick pad (Telfa) to cover the steri-strips\\n3. Apply tape (hypafix) over the non-stick pad\\n4. Change once per day for 5 days\\n5. Shower with bandage on, change bandage after shower\\n\\nPatient is not to engage in any heavy lifting, exercise, hot tub, or swimming for the next 14 days. Should the patient develop any fevers, chills, bleeding, severe pain patient will contact the office immediately.
Complex Repair And Melolabial Flap Text: The defect edges were debeveled with a #15 scalpel blade.  The primary defect was closed partially with a complex linear closure.  Given the location of the remaining defect, shape of the defect and the proximity to free margins a melolabial flap was deemed most appropriate for complete closure of the defect.  Using a sterile surgical marker, an appropriate advancement flap was drawn incorporating the defect and placing the expected incisions within the relaxed skin tension lines where possible.    The area thus outlined was incised deep to adipose tissue with a #15 scalpel blade.  The skin margins were undermined to an appropriate distance in all directions utilizing iris scissors.
Excision Depth: adipose tissue
Island Pedicle Flap-Requiring Vessel Identification Text: The defect edges were debeveled with a #15 scalpel blade.  Given the location of the defect, shape of the defect and the proximity to free margins an island pedicle advancement flap was deemed most appropriate.  Using a sterile surgical marker, an appropriate advancement flap was drawn, based on the axial vessel mentioned above, incorporating the defect, outlining the appropriate donor tissue and placing the expected incisions within the relaxed skin tension lines where possible.    The area thus outlined was incised deep to adipose tissue with a #15 scalpel blade.  The skin margins were undermined to an appropriate distance in all directions around the primary defect and laterally outward around the island pedicle utilizing iris scissors.  There was minimal undermining beneath the pedicle flap.
Split-Thickness Skin Graft Text: The defect edges were debeveled with a #15 scalpel blade.  Given the location of the defect, shape of the defect and the proximity to free margins a split thickness skin graft was deemed most appropriate.  Using a sterile surgical marker, the primary defect shape was transferred to the donor site. The split thickness graft was then harvested.  The skin graft was then placed in the primary defect and oriented appropriately.
Surgeon Performing The Repair (Optional): Ayesha
Home Suture Removal Text: Patient was provided a home suture removal kit and will remove their sutures at home.  If they have any questions or difficulties they will call the office.
Dermal Closure: buried vertical mattress
Keystone Flap Text: The defect edges were debeveled with a #15 scalpel blade.  Given the location of the defect, shape of the defect a keystone flap was deemed most appropriate.  Using a sterile surgical marker, an appropriate keystone flap was drawn incorporating the defect, outlining the appropriate donor tissue and placing the expected incisions within the relaxed skin tension lines where possible. The area thus outlined was incised deep to adipose tissue with a #15 scalpel blade.  The skin margins were undermined to an appropriate distance in all directions around the primary defect and laterally outward around the flap utilizing iris scissors.
Transposition Flap Text: The defect edges were debeveled with a #15 scalpel blade.  Given the location of the defect and the proximity to free margins a transposition flap was deemed most appropriate.  Using a sterile surgical marker, an appropriate transposition flap was drawn incorporating the defect.    The area thus outlined was incised deep to adipose tissue with a #15 scalpel blade.  The skin margins were undermined to an appropriate distance in all directions utilizing iris scissors.
Positioning (Leave Blank If You Do Not Want): The patient was placed in a comfortable position exposing the surgical site.
Complex Repair And Bilobe Flap Text: The defect edges were debeveled with a #15 scalpel blade.  The primary defect was closed partially with a complex linear closure.  Given the location of the remaining defect, shape of the defect and the proximity to free margins a bilobe flap was deemed most appropriate for complete closure of the defect.  Using a sterile surgical marker, an appropriate advancement flap was drawn incorporating the defect and placing the expected incisions within the relaxed skin tension lines where possible.    The area thus outlined was incised deep to adipose tissue with a #15 scalpel blade.  The skin margins were undermined to an appropriate distance in all directions utilizing iris scissors.
Ftsg Text: The defect edges were debeveled with a #15 scalpel blade.  Given the location of the defect, shape of the defect and the proximity to free margins a full thickness skin graft was deemed most appropriate.  Using a sterile surgical marker, the primary defect shape was transferred to the donor site. The area thus outlined was incised deep to adipose tissue with a #15 scalpel blade.  The harvested graft was then trimmed of adipose tissue until only dermis and epidermis was left.  The skin margins of the secondary defect were undermined to an appropriate distance in all directions utilizing iris scissors.  The secondary defect was closed with interrupted buried subcutaneous sutures.  The skin edges were then re-apposed with running  sutures.  The skin graft was then placed in the primary defect and oriented appropriately.
Path Notes (To The Dermatopathologist): Please check margins.
Repair Performed By Another Provider Text (Leave Blank If You Do Not Want): After the tissue was excised the defect was repaired by another provider.
Muscle Hinge Flap Text: The defect edges were debeveled with a #15 scalpel blade.  Given the size, depth and location of the defect and the proximity to free margins a muscle hinge flap was deemed most appropriate.  Using a sterile surgical marker, an appropriate hinge flap was drawn incorporating the defect. The area thus outlined was incised with a #15 scalpel blade.  The skin margins were undermined to an appropriate distance in all directions utilizing iris scissors.
Scalpel Size: 15 blade
Complex Repair And O-L Flap Text: The defect edges were debeveled with a #15 scalpel blade.  The primary defect was closed partially with a complex linear closure.  Given the location of the remaining defect, shape of the defect and the proximity to free margins an O-L flap was deemed most appropriate for complete closure of the defect.  Using a sterile surgical marker, an appropriate flap was drawn incorporating the defect and placing the expected incisions within the relaxed skin tension lines where possible.    The area thus outlined was incised deep to adipose tissue with a #15 scalpel blade.  The skin margins were undermined to an appropriate distance in all directions utilizing iris scissors.
Pre-Excision Curettage Text (Leave Blank If You Do Not Want): Prior to drawing the surgical margin the visible lesion was removed with electrodesiccation and curettage to clearly define the lesion size.
O-T Plasty Text: The defect edges were debeveled with a #15 scalpel blade.  Given the location of the defect, shape of the defect and the proximity to free margins an O-T plasty was deemed most appropriate.  Using a sterile surgical marker, an appropriate O-T plasty was drawn incorporating the defect and placing the expected incisions within the relaxed skin tension lines where possible.    The area thus outlined was incised deep to adipose tissue with a #15 scalpel blade.  The skin margins were undermined to an appropriate distance in all directions utilizing iris scissors.
Lip Wedge Excision Repair Text: Given the location of the defect and the proximity to free margins a full thickness wedge repair was deemed most appropriate.  Using a sterile surgical marker, the appropriate repair was drawn incorporating the defect and placing the expected incisions perpendicular to the vermilion border.  The vermilion border was also meticulously outlined to ensure appropriate reapproximation during the repair.  The area thus outlined was incised through and through with a #15 scalpel blade.  The muscularis and dermis were reaproximated with deep sutures following hemostasis. Care was taken to realign the vermilion border before proceeding with the superficial closure.  Once the vermilion was realigned the superfical and mucosal closure was finished.
Detail Level: Detailed
No Repair - Repaired With Adjacent Surgical Defect Text (Leave Blank If You Do Not Want): After the excision the defect was repaired concurrently with another surgical defect which was in close approximation.
Epidermal Sutures: 5-0 Vicryl Rapide
Melolabial Transposition Flap Text: The defect edges were debeveled with a #15 scalpel blade.  Given the location of the defect and the proximity to free margins a melolabial flap was deemed most appropriate.  Using a sterile surgical marker, an appropriate melolabial transposition flap was drawn incorporating the defect.    The area thus outlined was incised deep to adipose tissue with a #15 scalpel blade.  The skin margins were undermined to an appropriate distance in all directions utilizing iris scissors.
Complex Repair And Skin Substitute Graft Text: The defect edges were debeveled with a #15 scalpel blade.  The primary defect was closed partially with a complex linear closure.  Given the location of the remaining defect, shape of the defect and the proximity to free margins a skin substitute graft was deemed most appropriate to repair the remaining defect.  The graft was trimmed to fit the size of the remaining defect.  The graft was then placed in the primary defect, oriented appropriately, and sutured into place.
Complex Repair And O-T Advancement Flap Text: The defect edges were debeveled with a #15 scalpel blade.  The primary defect was closed partially with a complex linear closure.  Given the location of the remaining defect, shape of the defect and the proximity to free margins an O-T advancement flap was deemed most appropriate for complete closure of the defect.  Using a sterile surgical marker, an appropriate advancement flap was drawn incorporating the defect and placing the expected incisions within the relaxed skin tension lines where possible.    The area thus outlined was incised deep to adipose tissue with a #15 scalpel blade.  The skin margins were undermined to an appropriate distance in all directions utilizing iris scissors.
O-Z Plasty Text: The defect edges were debeveled with a #15 scalpel blade.  Given the location of the defect, shape of the defect and the proximity to free margins an O-Z plasty (double transposition flap) was deemed most appropriate.  Using a sterile surgical marker, the appropriate transposition flaps were drawn incorporating the defect and placing the expected incisions within the relaxed skin tension lines where possible.    The area thus outlined was incised deep to adipose tissue with a #15 scalpel blade.  The skin margins were undermined to an appropriate distance in all directions utilizing iris scissors.  Hemostasis was achieved with electrocautery.  The flaps were then transposed into place, one clockwise and the other counterclockwise, and anchored with interrupted buried subcutaneous sutures.
Paramedian Forehead Flap Text: A decision was made to reconstruct the defect utilizing an interpolation axial flap and a staged reconstruction.  A telfa template was made of the defect.  This telfa template was then used to outline the paramedian forehead pedicle flap.  The donor area for the pedicle flap was then injected with anesthesia.  The flap was excised through the skin and subcutaneous tissue down to the layer of the underlying musculature.  The pedicle flap was carefully excised within this deep plane to maintain its blood supply.  The edges of the donor site were undermined.   The donor site was closed in a primary fashion.  The pedicle was then rotated into position and sutured.  Once the tube was sutured into place, adequate blood supply was confirmed with blanching and refill.  The pedicle was then wrapped with xeroform gauze and dressed appropriately with a telfa and gauze bandage to ensure continued blood supply and protect the attached pedicle.
Advancement Flap (Single) Text: The defect edges were debeveled with a #15 scalpel blade.  Given the location of the defect and the proximity to free margins a single advancement flap was deemed most appropriate.  Using a sterile surgical marker, an appropriate advancement flap was drawn incorporating the defect and placing the expected incisions within the relaxed skin tension lines where possible.    The area thus outlined was incised deep to adipose tissue with a #15 scalpel blade.  The skin margins were undermined to an appropriate distance in all directions utilizing iris scissors.
Double O-Z Plasty Text: The defect edges were debeveled with a #15 scalpel blade.  Given the location of the defect, shape of the defect and the proximity to free margins a Double O-Z plasty (double transposition flap) was deemed most appropriate.  Using a sterile surgical marker, the appropriate transposition flaps were drawn incorporating the defect and placing the expected incisions within the relaxed skin tension lines where possible. The area thus outlined was incised deep to adipose tissue with a #15 scalpel blade.  The skin margins were undermined to an appropriate distance in all directions utilizing iris scissors.  Hemostasis was achieved with electrocautery.  The flaps were then transposed into place, one clockwise and the other counterclockwise, and anchored with interrupted buried subcutaneous sutures.
Complex Repair And Xenograft Text: The defect edges were debeveled with a #15 scalpel blade.  The primary defect was closed partially with a complex linear closure.  Given the location of the defect, shape of the defect and the proximity to free margins a xenograft was deemed most appropriate to repair the remaining defect.  The graft was trimmed to fit the size of the remaining defect.  The graft was then placed in the primary defect, oriented appropriately, and sutured into place.
Rhombic Flap Text: The defect edges were debeveled with a #15 scalpel blade.  Given the location of the defect and the proximity to free margins a rhombic flap was deemed most appropriate.  Using a sterile surgical marker, an appropriate rhombic flap was drawn incorporating the defect.    The area thus outlined was incised deep to adipose tissue with a #15 scalpel blade.  The skin margins were undermined to an appropriate distance in all directions utilizing iris scissors.
Epidermal Closure Graft Donor Site (Optional): simple interrupted
Intermediate / Complex Repair - Final Wound Length In Cm: 5.1
Body Location Override (Optional - Billing Will Still Be Based On Selected Body Map Location If Applicable): right distal dorsal forearm
Complex Repair And A-T Advancement Flap Text: The defect edges were debeveled with a #15 scalpel blade.  The primary defect was closed partially with a complex linear closure.  Given the location of the remaining defect, shape of the defect and the proximity to free margins an A-T advancement flap was deemed most appropriate for complete closure of the defect.  Using a sterile surgical marker, an appropriate advancement flap was drawn incorporating the defect and placing the expected incisions within the relaxed skin tension lines where possible.    The area thus outlined was incised deep to adipose tissue with a #15 scalpel blade.  The skin margins were undermined to an appropriate distance in all directions utilizing iris scissors.
Posterior Auricular Interpolation Flap Text: A decision was made to reconstruct the defect utilizing an interpolation axial flap and a staged reconstruction.  A telfa template was made of the defect.  This telfa template was then used to outline the posterior auricular interpolation flap.  The donor area for the pedicle flap was then injected with anesthesia.  The flap was excised through the skin and subcutaneous tissue down to the layer of the underlying musculature.  The pedicle flap was carefully excised within this deep plane to maintain its blood supply.  The edges of the donor site were undermined.   The donor site was closed in a primary fashion.  The pedicle was then rotated into position and sutured.  Once the tube was sutured into place, adequate blood supply was confirmed with blanching and refill.  The pedicle was then wrapped with xeroform gauze and dressed appropriately with a telfa and gauze bandage to ensure continued blood supply and protect the attached pedicle.
Rhomboid Transposition Flap Text: The defect edges were debeveled with a #15 scalpel blade.  Given the location of the defect and the proximity to free margins a rhomboid transposition flap was deemed most appropriate.  Using a sterile surgical marker, an appropriate rhomboid flap was drawn incorporating the defect.    The area thus outlined was incised deep to adipose tissue with a #15 scalpel blade.  The skin margins were undermined to an appropriate distance in all directions utilizing iris scissors.
S Plasty Text: Given the location and shape of the defect, and the orientation of relaxed skin tension lines, an S-plasty was deemed most appropriate for repair.  Using a sterile surgical marker, the appropriate outline of the S-plasty was drawn, incorporating the defect and placing the expected incisions within the relaxed skin tension lines where possible.  The area thus outlined was incised deep to adipose tissue with a #15 scalpel blade.  The skin margins were undermined to an appropriate distance in all directions utilizing iris scissors. The skin flaps were advanced over the defect.  The opposing margins were then approximated with interrupted buried subcutaneous sutures.
Complex Repair And Tissue Cultured Epidermal Autograft Text: The defect edges were debeveled with a #15 scalpel blade.  The primary defect was closed partially with a complex linear closure.  Given the location of the defect, shape of the defect and the proximity to free margins an tissue cultured epidermal autograft was deemed most appropriate to repair the remaining defect.  The graft was trimmed to fit the size of the remaining defect.  The graft was then placed in the primary defect, oriented appropriately, and sutured into place.
Complex Repair And Modified Advancement Flap Text: The defect edges were debeveled with a #15 scalpel blade.  The primary defect was closed partially with a complex linear closure.  Given the location of the remaining defect, shape of the defect and the proximity to free margins a modified advancement flap was deemed most appropriate for complete closure of the defect.  Using a sterile surgical marker, an appropriate advancement flap was drawn incorporating the defect and placing the expected incisions within the relaxed skin tension lines where possible.    The area thus outlined was incised deep to adipose tissue with a #15 scalpel blade.  The skin margins were undermined to an appropriate distance in all directions utilizing iris scissors.
Information: Selecting Yes will display possible errors in your note based on the variables you have selected. This validation is only offered as a suggestion for you. PLEASE NOTE THAT THE VALIDATION TEXT WILL BE REMOVED WHEN YOU FINALIZE YOUR NOTE. IF YOU WANT TO FAX A PRELIMINARY NOTE YOU WILL NEED TO TOGGLE THIS TO 'NO' IF YOU DO NOT WANT IT IN YOUR FAXED NOTE.
Mastoid Interpolation Flap Text: A decision was made to reconstruct the defect utilizing an interpolation axial flap and a staged reconstruction.  A telfa template was made of the defect.  This telfa template was then used to outline the mastoid interpolation flap.  The donor area for the pedicle flap was then injected with anesthesia.  The flap was excised through the skin and subcutaneous tissue down to the layer of the underlying musculature.  The pedicle flap was carefully excised within this deep plane to maintain its blood supply.  The edges of the donor site were undermined.   The donor site was closed in a primary fashion.  The pedicle was then rotated into position and sutured.  Once the tube was sutured into place, adequate blood supply was confirmed with blanching and refill.  The pedicle was then wrapped with xeroform gauze and dressed appropriately with a telfa and gauze bandage to ensure continued blood supply and protect the attached pedicle.
Advancement Flap (Double) Text: The defect edges were debeveled with a #15 scalpel blade.  Given the location of the defect and the proximity to free margins a double advancement flap was deemed most appropriate.  Using a sterile surgical marker, the appropriate advancement flaps were drawn incorporating the defect and placing the expected incisions within the relaxed skin tension lines where possible.    The area thus outlined was incised deep to adipose tissue with a #15 scalpel blade.  The skin margins were undermined to an appropriate distance in all directions utilizing iris scissors.
Bi-Rhombic Flap Text: The defect edges were debeveled with a #15 scalpel blade.  Given the location of the defect and the proximity to free margins a bi-rhombic flap was deemed most appropriate.  Using a sterile surgical marker, an appropriate rhombic flap was drawn incorporating the defect. The area thus outlined was incised deep to adipose tissue with a #15 scalpel blade.  The skin margins were undermined to an appropriate distance in all directions utilizing iris scissors.
Complex Repair And Dermal Autograft Text: The defect edges were debeveled with a #15 scalpel blade.  The primary defect was closed partially with a complex linear closure.  Given the location of the defect, shape of the defect and the proximity to free margins an dermal autograft was deemed most appropriate to repair the remaining defect.  The graft was trimmed to fit the size of the remaining defect.  The graft was then placed in the primary defect, oriented appropriately, and sutured into place.
Repair Type: Complex
Complex Repair And Double Advancement Flap Text: The defect edges were debeveled with a #15 scalpel blade.  The primary defect was closed partially with a complex linear closure.  Given the location of the remaining defect, shape of the defect and the proximity to free margins a double advancement flap was deemed most appropriate for complete closure of the defect.  Using a sterile surgical marker, an appropriate advancement flap was drawn incorporating the defect and placing the expected incisions within the relaxed skin tension lines where possible.    The area thus outlined was incised deep to adipose tissue with a #15 scalpel blade.  The skin margins were undermined to an appropriate distance in all directions utilizing iris scissors.
Burow's Advancement Flap Text: The defect edges were debeveled with a #15 scalpel blade.  Given the location of the defect and the proximity to free margins a Burow's advancement flap was deemed most appropriate.  Using a sterile surgical marker, the appropriate advancement flap was drawn incorporating the defect and placing the expected incisions within the relaxed skin tension lines where possible.    The area thus outlined was incised deep to adipose tissue with a #15 scalpel blade.  The skin margins were undermined to an appropriate distance in all directions utilizing iris scissors.
V-Y Plasty Text: The defect edges were debeveled with a #15 scalpel blade.  Given the location of the defect, shape of the defect and the proximity to free margins an V-Y advancement flap was deemed most appropriate.  Using a sterile surgical marker, an appropriate advancement flap was drawn incorporating the defect and placing the expected incisions within the relaxed skin tension lines where possible.    The area thus outlined was incised deep to adipose tissue with a #15 scalpel blade.  The skin margins were undermined to an appropriate distance in all directions utilizing iris scissors.
Melolabial Interpolation Flap Text: A decision was made to reconstruct the defect utilizing an interpolation axial flap and a staged reconstruction.  A telfa template was made of the defect.  This telfa template was then used to outline the melolabial interpolation flap.  The donor area for the pedicle flap was then injected with anesthesia.  The flap was excised through the skin and subcutaneous tissue down to the layer of the underlying musculature.  The pedicle flap was carefully excised within this deep plane to maintain its blood supply.  The edges of the donor site were undermined.   The donor site was closed in a primary fashion.  The pedicle was then rotated into position and sutured.  Once the tube was sutured into place, adequate blood supply was confirmed with blanching and refill.  The pedicle was then wrapped with xeroform gauze and dressed appropriately with a telfa and gauze bandage to ensure continued blood supply and protect the attached pedicle.
H Plasty Text: Given the location of the defect, shape of the defect and the proximity to free margins a H-plasty was deemed most appropriate for repair.  Using a sterile surgical marker, the appropriate advancement arms of the H-plasty were drawn incorporating the defect and placing the expected incisions within the relaxed skin tension lines where possible. The area thus outlined was incised deep to adipose tissue with a #15 scalpel blade. The skin margins were undermined to an appropriate distance in all directions utilizing iris scissors.  The opposing advancement arms were then advanced into place in opposite direction and anchored with interrupted buried subcutaneous sutures.
Complex Repair And Epidermal Autograft Text: The defect edges were debeveled with a #15 scalpel blade.  The primary defect was closed partially with a complex linear closure.  Given the location of the defect, shape of the defect and the proximity to free margins an epidermal autograft was deemed most appropriate to repair the remaining defect.  The graft was trimmed to fit the size of the remaining defect.  The graft was then placed in the primary defect, oriented appropriately, and sutured into place.
Helical Rim Advancement Flap Text: The defect edges were debeveled with a #15 blade scalpel.  Given the location of the defect and the proximity to free margins (helical rim) a double helical rim advancement flap was deemed most appropriate.  Using a sterile surgical marker, the appropriate advancement flaps were drawn incorporating the defect and placing the expected incisions between the helical rim and antihelix where possible.  The area thus outlined was incised through and through with a #15 scalpel blade.  With a skin hook and iris scissors, the flaps were gently and sharply undermined and freed up.
Complex Repair And Single Advancement Flap Text: The defect edges were debeveled with a #15 scalpel blade.  The primary defect was closed partially with a complex linear closure.  Given the location of the remaining defect, shape of the defect and the proximity to free margins a single advancement flap was deemed most appropriate for complete closure of the defect.  Using a sterile surgical marker, an appropriate advancement flap was drawn incorporating the defect and placing the expected incisions within the relaxed skin tension lines where possible.    The area thus outlined was incised deep to adipose tissue with a #15 scalpel blade.  The skin margins were undermined to an appropriate distance in all directions utilizing iris scissors.
Crescentic Advancement Flap Text: The defect edges were debeveled with a #15 scalpel blade.  Given the location of the defect and the proximity to free margins a crescentic advancement flap was deemed most appropriate.  Using a sterile surgical marker, the appropriate advancement flap was drawn incorporating the defect and placing the expected incisions within the relaxed skin tension lines where possible.    The area thus outlined was incised deep to adipose tissue with a #15 scalpel blade.  The skin margins were undermined to an appropriate distance in all directions utilizing iris scissors.
Repair Anesthesia Method: local infiltration

## 2019-04-22 ENCOUNTER — APPOINTMENT (RX ONLY)
Dept: URBAN - METROPOLITAN AREA CLINIC 36 | Facility: CLINIC | Age: 84
Setting detail: DERMATOLOGY
End: 2019-04-22

## 2019-04-22 PROBLEM — C44.319 BASAL CELL CARCINOMA OF SKIN OF OTHER PARTS OF FACE: Status: ACTIVE | Noted: 2019-04-22

## 2019-04-22 PROCEDURE — 14040 TIS TRNFR F/C/C/M/N/A/G/H/F: CPT

## 2019-04-22 PROCEDURE — 17311 MOHS 1 STAGE H/N/HF/G: CPT

## 2019-04-22 PROCEDURE — ? MOHS SURGERY

## 2019-04-22 PROCEDURE — 17312 MOHS ADDL STAGE: CPT

## 2019-04-22 NOTE — PROCEDURE: MOHS SURGERY
Localized Dermabrasion With Wire Brush Text: The patient was draped in routine manner.  Localized dermabrasion using 3 x 17 mm wire brush was performed in routine manner to papillary dermis. This spot dermabrasion is being performed to complete skin cancer reconstruction. It also will eliminate the other sun damaged precancerous cells that are known to be part of the regional effect of a lifetime's worth of sun exposure. This localized dermabrasion is therapeutic and should not be considered cosmetic in any regard.
Biopsy Photograph Reviewed: Yes
O-L Flap Text: The defect edges were debeveled with a #15 scalpel blade.  Given the location of the defect, shape of the defect and the proximity to free margins an O-L flap was deemed most appropriate.  Using a sterile surgical marker, an appropriate advancement flap was drawn incorporating the defect and placing the expected incisions within the relaxed skin tension lines where possible.    The area thus outlined was incised deep to adipose tissue with a #15 scalpel blade.  The skin margins were undermined to an appropriate distance in all directions utilizing iris scissors.
Add Option For Suturegard When Performing Closure?: No
Double M-Plasty Intermediate Repair Preamble Text (Leave Blank If You Do Not Want): Undermining was performed with blunt dissection.
Quadrants Reporting?: 0
Deep Sutures: 5-0 Vicryl
Detail Level: Detailed
Cheiloplasty (Less Than 50%) Text: A decision was made to reconstruct the defect with a  cheiloplasty.  The defect was undermined extensively.  Additional obicularis oris muscle was excised with a 15 blade scalpel.  The defect was converted into a full thickness wedge, of less than 50% of the vertical height of the lip, to facilite a better cosmetic result.  Small vessels were then tied off with 5-0 monocyrl. The obicularis oris, superficial fascia, adipose and dermis were then reapproximated.  After the deeper layers were approximated the epidermis was reapproximated with particular care given to realign the vermilion border.
Bilobed Transposition Flap Text: The defect edges were debeveled with a #15 scalpel blade.  Given the location of the defect and the proximity to free margins a bilobed transposition flap was deemed most appropriate.  Using a sterile surgical marker, an appropriate bilobe flap drawn around the defect.    The area thus outlined was incised deep to adipose tissue with a #15 scalpel blade.  The skin margins were undermined to an appropriate distance in all directions utilizing iris scissors.
Modified Advancement Flap Text: The defect edges were debeveled with a #15 scalpel blade.  Given the location of the defect, shape of the defect and the proximity to free margins a modified advancement flap was deemed most appropriate.  Using a sterile surgical marker, an appropriate advancement flap was drawn incorporating the defect and placing the expected incisions within the relaxed skin tension lines where possible.    The area thus outlined was incised deep to adipose tissue with a #15 scalpel blade.  The skin margins were undermined to an appropriate distance in all directions utilizing iris scissors.
Island Pedicle Flap-Requiring Vessel Identification Text: The defect edges were debeveled with a #15 scalpel blade.  Given the location of the defect, shape of the defect and the proximity to free margins an island pedicle advancement flap was deemed most appropriate.  Using a sterile surgical marker, an appropriate advancement flap was drawn, based on the axial vessel mentioned above, incorporating the defect, outlining the appropriate donor tissue and placing the expected incisions within the relaxed skin tension lines where possible.    The area thus outlined was incised deep to adipose tissue with a #15 scalpel blade.  The skin margins were undermined to an appropriate distance in all directions around the primary defect and laterally outward around the island pedicle utilizing iris scissors.  There was minimal undermining beneath the pedicle flap.
Eye Protection Verbiage: Before proceeding with the stage, a plastic scleral shield was inserted. The globe was anesthetized with a few drops of 1% lidocaine with 1:100,000 epinephrine. Then, an appropriate sized scleral shield was chosen and coated with lacrilube ointment. The shield was gently inserted and left in place for the duration of each stage. After the stage was completed, the shield was gently removed.
Otolaryngologist Procedure Text (D): After obtaining clear surgical margins the patient was sent to otolaryngology for surgical repair.  The patient understands they will receive post-surgical care and follow-up from the referring physician's office.
Post-Care Instructions: I reviewed with the patient in detail post-care instructions. Patient is not to engage in any heavy lifting, exercise, or swimming for the next 14 days. Should the patient develop any fevers, chills, bleeding, severe pain patient will contact the office immediately.
Stage 14: Additional Anesthesia Type: 1% lidocaine with epinephrine
Dressing: dry sterile dressing
Unique Flap 1 Name: Myocutaneous Island pedicle Flap
Ftsg Text: The defect edges were debeveled with a #15 scalpel blade.  Given the location of the defect, shape of the defect and the proximity to free margins a full thickness skin graft was deemed most appropriate.  Using a sterile surgical marker, the primary defect shape was transferred to the donor site. The area thus outlined was incised deep to adipose tissue with a #15 scalpel blade.  The harvested graft was then trimmed of adipose tissue until only dermis and epidermis was left.  The skin margins of the secondary defect were undermined to an appropriate distance in all directions utilizing iris scissors.  The secondary defect was closed with interrupted buried subcutaneous sutures.  The skin edges were then re-apposed with running  sutures.  The skin graft was then placed in the primary defect and oriented appropriately.
Surgical Defect Width In Cm (Optional): 1.2
Island Pedicle Flap With Canthal Suspension Text: The defect edges were debeveled with a #15 scalpel blade.  Given the location of the defect, shape of the defect and the proximity to free margins an island pedicle advancement flap was deemed most appropriate.  Using a sterile surgical marker, an appropriate advancement flap was drawn incorporating the defect, outlining the appropriate donor tissue and placing the expected incisions within the relaxed skin tension lines where possible. The area thus outlined was incised deep to adipose tissue with a #15 scalpel blade.  The skin margins were undermined to an appropriate distance in all directions around the primary defect and laterally outward around the island pedicle utilizing iris scissors.  There was minimal undermining beneath the pedicle flap. A suspension suture was placed in the canthal tendon to prevent tension and prevent ectropion.
Asc Procedure Text (B): After obtaining clear surgical margins the patient was sent to an ASC for surgical repair.  The patient understands they will receive post-surgical care and follow-up from the ASC physician.
No Repair - Repaired With Adjacent Surgical Defect Text (Leave Blank If You Do Not Want): After obtaining clear surgical margins the defect was repaired concurrently with another surgical defect which was in close approximation.
Oculoplastic Surgeon Procedure Text (A): After obtaining clear surgical margins the patient was sent to oculoplastics for surgical repair.  The patient understands they will receive post-surgical care and follow-up from the referring physician's office.
Spiral Flap Text: The defect edges were debeveled with a #15 scalpel blade.  Given the location of the defect, shape of the defect and the proximity to free margins a spiral flap was deemed most appropriate.  Using a sterile surgical marker, an appropriate rotation flap was drawn incorporating the defect and placing the expected incisions within the relaxed skin tension lines where possible. The area thus outlined was incised deep to adipose tissue with a #15 scalpel blade.  The skin margins were undermined to an appropriate distance in all directions utilizing iris scissors.
Aggressive Histology Indication Override: multifocal
Secondary Defect Width In Cm (Required For Flaps): 1.6
Bcc Infiltrative Histology Text: There were numerous aggregates of basaloid cells demonstrating an infiltrative pattern.
Double O-Z Plasty Text: The defect edges were debeveled with a #15 scalpel blade.  Given the location of the defect, shape of the defect and the proximity to free margins a Double O-Z plasty (double transposition flap) was deemed most appropriate.  Using a sterile surgical marker, the appropriate transposition flaps were drawn incorporating the defect and placing the expected incisions within the relaxed skin tension lines where possible. The area thus outlined was incised deep to adipose tissue with a #15 scalpel blade.  The skin margins were undermined to an appropriate distance in all directions utilizing iris scissors.  Hemostasis was achieved with electrocautery.  The flaps were then transposed into place, one clockwise and the other counterclockwise, and anchored with interrupted buried subcutaneous sutures.
Unique Flap 1 Text: A decision was made to reconstruct the defect utilizing a myocutaneous Island pedicle Flap based on the levator labii superioris muscle.  A telfa template was made of the defect.  This telfa template was then used to outline the myocutaneous flap, based along the meilolabial fold.  The donor area for the pedicle flap was then injected with anesthesia.  The flap was excised through the skin and subcutaneous tissue down to the layer of the underlying musculature.  The myocutaneous flap was carefully excised within this deep plane to maintain its blood supply. Based on the muscle. The edges of the donor site were undermined.   The donor site was closed in a primary fashion to the point of transposition.  The pedicle was then transposed into position and sutured.  Once the flap was sutured into place, adequate blood supply was confirmed with blanching and refill.
Estimated Blood Loss (Cc): less than 5 cc
S Plasty Text: Given the location and shape of the defect, and the orientation of relaxed skin tension lines, an S-plasty was deemed most appropriate for repair.  Using a sterile surgical marker, the appropriate outline of the S-plasty was drawn, incorporating the defect and placing the expected incisions within the relaxed skin tension lines where possible.  The area thus outlined was incised deep to adipose tissue with a #15 scalpel blade.  The skin margins were undermined to an appropriate distance in all directions utilizing iris scissors. The skin flaps were advanced over the defect.  The opposing margins were then approximated with interrupted buried subcutaneous sutures.
Unique Flap 2 Text: A decision was made to reconstruct the defect utilizing a Peng Flap (Bilateral Advancement Rotation Flap). Given the location of the defect and the proximity to free margins, this flap was deemed most appropriate.  Using a sterile surgical marker, the appropriate rotation flaps were drawn incorporating the defect and placing the expected incisions within the relaxed skin tension lines where possible.    The area thus outlined was incised deep to adipose tissue with a #15 scalpel blade.  The skin margins were undermined to an appropriate distance in all directions utilizing iris scissors.
Dermal Autograft Text: The defect edges were debeveled with a #15 scalpel blade.  Given the location of the defect, shape of the defect and the proximity to free margins a dermal autograft was deemed most appropriate.  Using a sterile surgical marker, the primary defect shape was transferred to the donor site. The area thus outlined was incised deep to adipose tissue with a #15 scalpel blade.  The harvested graft was then trimmed of adipose and epidermal tissue until only dermis was left.  The skin graft was then placed in the primary defect and oriented appropriately.
Referred To Mid-Level For Closure Text (Leave Blank If You Do Not Want): After obtaining clear surgical margins the patient was sent to a mid-level provider for surgical repair.  The patient understands they will receive post-surgical care and follow-up from the mid-level provider.
Alternatives Discussed Intro (Do Not Add Period): I discussed alternative treatments to Mohs surgery and specifically discussed the risks and benefits of
Provider Procedure Text (D): After obtaining clear surgical margins the defect was repaired by another provider.
Rhombic Flap Text: The defect edges were debeveled with a #15 scalpel blade.  Given the location of the defect and the proximity to free margins a rhombic flap was deemed most appropriate.  Using a sterile surgical marker, an appropriate rhombic flap was drawn incorporating the defect.    The area thus outlined was incised deep to adipose tissue with a #15 scalpel blade.  The skin margins were undermined to an appropriate distance in all directions utilizing iris scissors.
Z Plasty Text: The lesion was extirpated to the level of the fat with a #15 scalpel blade.  Given the location of the defect, shape of the defect and the proximity to free margins a Z-plasty was deemed most appropriate for repair.  Using a sterile surgical marker, the appropriate transposition arms of the Z-plasty were drawn incorporating the defect and placing the expected incisions within the relaxed skin tension lines where possible.    The area thus outlined was incised deep to adipose tissue with a #15 scalpel blade.  The skin margins were undermined to an appropriate distance in all directions utilizing iris scissors.  The opposing transposition arms were then transposed into place in opposite direction and anchored with interrupted buried subcutaneous sutures.
Retention Suture Bite Size: 3 mm
Burow's Advancement Flap Text: The defect edges were debeveled with a #15 scalpel blade.  Given the location of the defect and the proximity to free margins a Burow's advancement flap was deemed most appropriate.  Using a sterile surgical marker, the appropriate advancement flap was drawn incorporating the defect and placing the expected incisions within the relaxed skin tension lines where possible.    The area thus outlined was incised deep to adipose tissue with a #15 scalpel blade.  The skin margins were undermined to an appropriate distance in all directions utilizing iris scissors.
Closure 4 Information: This tab is for additional flaps and grafts above and beyond our usual structured repairs.  Please note if you enter information here it will not currently bill and you will need to add the billing information manually.
Consent (Temporal Branch)/Introductory Paragraph: The rationale for Mohs was explained to the patient and consent was obtained. The risks, benefits and alternatives to therapy were discussed in detail. Specifically, the risks of damage to the temporal branch of the facial nerve, infection, scarring, bleeding, prolonged wound healing, incomplete removal, allergy to anesthesia, and recurrence were addressed. Prior to the procedure, the treatment site was clearly identified and confirmed by the patient. All components of Universal Protocol/PAUSE Rule completed.
Purse String (Simple) Text: Given the location of the defect and the characteristics of the surrounding skin a purse string closure was deemed most appropriate.  Undermining was performed circumfirentially around the surgical defect.  A purse string suture was then placed and tightened.
Plastic Surgeon Procedure Text (C): After obtaining clear surgical margins the patient was sent to plastics for surgical repair.  The patient understands they will receive post-surgical care and follow-up from the referring physician's office.
No Residual Tumor Seen Histology Text: There were no malignant cells seen in the sections examined.
Double M-Plasty Complex Repair Preamble Text (Leave Blank If You Do Not Want): Extensive wide undermining was performed.
Bilateral Helical Rim Advancement Flap Text: The defect edges were debeveled with a #15 blade scalpel.  Given the location of the defect and the proximity to free margins (helical rim) a bilateral helical rim advancement flap was deemed most appropriate.  Using a sterile surgical marker, the appropriate advancement flaps were drawn incorporating the defect and placing the expected incisions between the helical rim and antihelix where possible.  The area thus outlined was incised through and through with a #15 scalpel blade.  With a skin hook and iris scissors, the flaps were gently and sharply undermined and freed up.
Consent (Ear)/Introductory Paragraph: The rationale for Mohs was explained to the patient and consent was obtained. The risks, benefits and alternatives to therapy were discussed in detail. Specifically, the risks of ear deformity, infection, scarring, bleeding, prolonged wound healing, incomplete removal, allergy to anesthesia, nerve injury and recurrence were addressed. Prior to the procedure, the treatment site was clearly identified and confirmed by the patient. All components of Universal Protocol/PAUSE Rule completed.
Unna Boot Text: An Unna boot was placed to help immobilize the limb and facilitate more rapid healing.
Melolabial Interpolation Flap Text: A decision was made to reconstruct the defect utilizing an interpolation axial flap and a staged reconstruction.  A telfa template was made of the defect.  This telfa template was then used to outline the melolabial interpolation flap.  The donor area for the pedicle flap was then injected with anesthesia.  The flap was excised through the skin and subcutaneous tissue down to the layer of the underlying musculature.  The pedicle flap was carefully excised within this deep plane to maintain its blood supply.  The edges of the donor site were undermined.   The donor site was closed in a primary fashion.  The pedicle was then rotated into position and sutured.  Once the tube was sutured into place, adequate blood supply was confirmed with blanching and refill.  The pedicle was then wrapped with xeroform gauze and dressed appropriately with a telfa and gauze bandage to ensure continued blood supply and protect the attached pedicle.
Home Suture Removal Text: Patient was provided instructions on removing sutures and will remove their sutures at home.  If they have any questions or difficulties they will call the office.
Consent (Nose)/Introductory Paragraph: The rationale for Mohs was explained to the patient and consent was obtained. The risks, benefits and alternatives to therapy were discussed in detail. Specifically, the risks of nasal deformity, changes in the flow of air through the nose, infection, scarring, bleeding, prolonged wound healing, incomplete removal, allergy to anesthesia, nerve injury and recurrence were addressed. Prior to the procedure, the treatment site was clearly identified and confirmed by the patient. All components of Universal Protocol/PAUSE Rule completed.
Tarsorrhaphy Text: A tarsorrhaphy was performed using Frost sutures.
V-Y Flap Text: The defect edges were debeveled with a #15 scalpel blade.  Given the location of the defect, shape of the defect and the proximity to free margins a V-Y flap was deemed most appropriate.  Using a sterile surgical marker, an appropriate advancement flap was drawn incorporating the defect and placing the expected incisions within the relaxed skin tension lines where possible.    The area thus outlined was incised deep to adipose tissue with a #15 scalpel blade.  The skin margins were undermined to an appropriate distance in all directions utilizing iris scissors.
Stage 2: Additional Anesthesia Type: 1% lidocaine with 1:100,000 epinephrine and 408mcg clindamycin/ml and a 1:10 solution of 8.4% sodium bicarbonate
Trilobed Flap Text: The defect edges were debeveled with a #15 scalpel blade.  Given the location of the defect and the proximity to free margins a trilobed flap was deemed most appropriate.  Using a sterile surgical marker, an appropriate trilobed flap drawn around the defect.    The area thus outlined was incised deep to adipose tissue with a #15 scalpel blade.  The skin margins were undermined to an appropriate distance in all directions utilizing iris scissors.
Non-Graft Cartilage Fenestration Text: The cartilage was fenestrated with a 2mm punch biopsy to help facilitate healing.
Suturegard Intro: Intraoperative tissue expansion was performed, utilizing the SUTUREGARD device, in order to reduce wound tension.
Anesthesia Type: 0.5% lidocaine with 1:200,000 epinephrine and a 1:10 solution of 8.4% sodium bicarbonate and 408mcg clindamycin/ml
Flap Type: Burow's Advancement Flap
Cheiloplasty (Complex) Text: A decision was made to reconstruct the defect with a  cheiloplasty.  The defect was undermined extensively.  Additional obicularis oris muscle was excised with a 15 blade scalpel.  The defect was converted into a full thickness wedge to facilite a better cosmetic result.  Small vessels were then tied off with 5-0 monocyrl. The obicularis oris, superficial fascia, adipose and dermis were then reapproximated.  After the deeper layers were approximated the epidermis was reapproximated with particular care given to realign the vermilion border.
Keystone Flap Text: The defect edges were debeveled with a #15 scalpel blade.  Given the location of the defect, shape of the defect a keystone flap was deemed most appropriate.  Using a sterile surgical marker, an appropriate keystone flap was drawn incorporating the defect, outlining the appropriate donor tissue and placing the expected incisions within the relaxed skin tension lines where possible. The area thus outlined was incised deep to adipose tissue with a #15 scalpel blade.  The skin margins were undermined to an appropriate distance in all directions around the primary defect and laterally outward around the flap utilizing iris scissors.
Unique Flap 2 Name: Peng Flap
Mucosal Advancement Flap Text: Given the location of the defect, shape of the defect and the proximity to free margins a mucosal advancement flap was deemed most appropriate. Incisions were made with a 15 blade scalpel in the appropriate fashion along the cutaneous vermilion border and the mucosal lip. The remaining actinically damaged mucosal tissue was excised.  The mucosal advancement flap was then elevated to the gingival sulcus with care taken to preserve the neurovascular structures and advanced into the primary defect. Care was taken to ensure that precise realignment of the vermilion border was achieved.
Stage 2: Number Of Blocks?: 1
Mohs Method Verbiage: An incision at a 45 degree angle following the standard Mohs approach was done and the specimen was harvested as a microscopic controlled layer.
Split-Thickness Skin Graft Text: The defect edges were debeveled with a #15 scalpel blade.  Given the location of the defect, shape of the defect and the proximity to free margins a split thickness skin graft was deemed most appropriate.  Using a sterile surgical marker, the primary defect shape was transferred to the donor site. The split thickness graft was then harvested.  The skin graft was then placed in the primary defect and oriented appropriately.
Star Wedge Flap Text: The defect edges were debeveled with a #15 scalpel blade.  Given the location of the defect, shape of the defect and the proximity to free margins a star wedge flap was deemed most appropriate.  Using a sterile surgical marker, an appropriate rotation flap was drawn incorporating the defect and placing the expected incisions within the relaxed skin tension lines where possible. The area thus outlined was incised deep to adipose tissue with a #15 scalpel blade.  The skin margins were undermined to an appropriate distance in all directions utilizing iris scissors.
Surgeon Performing Repair (Optional): Melly
Initial Size Of Lesion: 0.3
V-Y Plasty Text: The defect edges were debeveled with a #15 scalpel blade.  Given the location of the defect, shape of the defect and the proximity to free margins an V-Y advancement flap was deemed most appropriate.  Using a sterile surgical marker, an appropriate advancement flap was drawn incorporating the defect and placing the expected incisions within the relaxed skin tension lines where possible.    The area thus outlined was incised deep to adipose tissue with a #15 scalpel blade.  The skin margins were undermined to an appropriate distance in all directions utilizing iris scissors.
Transposition Flap Text: The defect edges were debeveled with a #15 scalpel blade.  Given the location of the defect and the proximity to free margins a transposition flap was deemed most appropriate.  Using a sterile surgical marker, an appropriate transposition flap was drawn incorporating the defect.    The area thus outlined was incised deep to adipose tissue with a #15 scalpel blade.  The skin margins were undermined to an appropriate distance in all directions utilizing iris scissors.
Subsequent Stages Histo Method Verbiage: Using a similar technique to that described above, a thin layer of tissue was removed from all areas where tumor was visible on the previous stage.  The tissue was again oriented, mapped, dyed, and processed as above.
Repair Anesthesia Method: local infiltration
Surgical Defect Length In Cm (Optional): 0.8
Consent 1/Introductory Paragraph: The rationale for Mohs was explained to the patient and consent was obtained. The risks, benefits and alternatives to therapy were discussed in detail. Specifically, the risks of infection, scarring, bleeding, prolonged wound healing, incomplete removal, allergy to anesthesia, nerve injury and recurrence were addressed. Prior to the procedure, the treatment site was clearly identified and confirmed by the patient. All components of Universal Protocol/PAUSE Rule completed.
Unique Flap 3 Text: The defect edges were debeveled with a #15 scalpel blade.  Given the location of the defect, shape of the defect and the proximity to free margins a Mercedes (double advancement flap) was deemed most appropriate.  Using a sterile surgical marker, the appropriate transposition flaps were drawn incorporating the defect and placing the expected incisions within the relaxed skin tension lines where possible.    The area thus outlined was incised deep to adipose tissue with a #15 scalpel blade.  The skin margins were undermined to an appropriate distance in all directions utilizing iris scissors.  Hemostasis was achieved with electrocautery.  The flaps were then advanced into the defect and anchored with interrupted buried subcutaneous sutures.
Skin Substitute Text: The defect edges were debeveled with a #15 scalpel blade.  Given the location of the defect, shape of the defect and the proximity to free margins a skin substitute graft was deemed most appropriate.  The graft material was trimmed to fit the size of the defect. The graft was then placed in the primary defect and oriented appropriately.
Rhomboid Transposition Flap Text: The defect edges were debeveled with a #15 scalpel blade.  Given the location of the defect and the proximity to free margins a rhomboid transposition flap was deemed most appropriate.  Using a sterile surgical marker, an appropriate rhomboid flap was drawn incorporating the defect.    The area thus outlined was incised deep to adipose tissue with a #15 scalpel blade.  The skin margins were undermined to an appropriate distance in all directions utilizing iris scissors.
Mauc Instructions: By selecting yes to the question below the MAUC number will be added into the note.  This will be calculated automatically based on the diagnosis chosen, the size entered, the body zone selected (H,M,L) and the specific indications you chose. You will also have the option to override the Mohs AUC if you disagree with the automatically calculated number and this option is found in the Case Summary tab.
Area H Indication Text: Tumors in this location are included in Area H (eyelids, eyebrows, nose, lips, chin, ear, pre-auricular, post-auricular, temple, genitalia, hands, feet, ankles and areola).  Tissue conservation is critical in these anatomic locations.
Epidermal Closure Graft Donor Site (Optional): simple interrupted
Cheek Interpolation Flap Text: A decision was made to reconstruct the defect utilizing an interpolation axial flap and a staged reconstruction.  A telfa template was made of the defect.  This telfa template was then used to outline the Cheek Interpolation flap.  The donor area for the pedicle flap was then injected with anesthesia.  The flap was excised through the skin and subcutaneous tissue down to the layer of the underlying musculature.  The interpolation flap was carefully excised within this deep plane to maintain its blood supply.  The edges of the donor site were undermined.   The donor site was closed in a primary fashion.  The pedicle was then rotated into position and sutured.  Once the tube was sutured into place, adequate blood supply was confirmed with blanching and refill.  The pedicle was then wrapped with xeroform gauze and dressed appropriately with a telfa and gauze bandage to ensure continued blood supply and protect the attached pedicle.
Crescentic Complex Repair Preamble Text (Leave Blank If You Do Not Want): Extensive wide undermining was performed at least 2 cm in all directions.
Purse String (Intermediate) Text: Given the location of the defect and the characteristics of the surrounding skin a purse string intermediate closure was deemed most appropriate.  Undermining was performed circumfirentially around the surgical defect.  A purse string suture was then placed and tightened.
Mohs Case Number: [id number]
Consent (Marginal Mandibular)/Introductory Paragraph: The rationale for Mohs was explained to the patient and consent was obtained. The risks, benefits and alternatives to therapy were discussed in detail. Specifically, the risks of damage to the marginal mandibular branch of the facial nerve, infection, scarring, bleeding, prolonged wound healing, incomplete removal, allergy to anesthesia, and recurrence were addressed. Prior to the procedure, the treatment site was clearly identified and confirmed by the patient. All components of Universal Protocol/PAUSE Rule completed.
Anesthesia Volume In Cc: 6
Crescentic Advancement Flap Text: The defect edges were debeveled with a #15 scalpel blade.  Given the location of the defect and the proximity to free margins a crescentic advancement flap was deemed most appropriate.  Using a sterile surgical marker, the appropriate advancement flap was drawn incorporating the defect and placing the expected incisions within the relaxed skin tension lines where possible.    The area thus outlined was incised deep to adipose tissue with a #15 scalpel blade.  The skin margins were undermined to an appropriate distance in all directions utilizing iris scissors.
Mastoid Interpolation Flap Text: A decision was made to reconstruct the defect utilizing an interpolation axial flap and a staged reconstruction.  A telfa template was made of the defect.  This telfa template was then used to outline the mastoid interpolation flap.  The donor area for the pedicle flap was then injected with anesthesia.  The flap was excised through the skin and subcutaneous tissue down to the layer of the underlying musculature.  The pedicle flap was carefully excised within this deep plane to maintain its blood supply.  The edges of the donor site were undermined.   The donor site was closed in a primary fashion.  The pedicle was then rotated into position and sutured.  Once the tube was sutured into place, adequate blood supply was confirmed with blanching and refill.  The pedicle was then wrapped with xeroform gauze and dressed appropriately with a telfa and gauze bandage to ensure continued blood supply and protect the attached pedicle.
Inflammation Suggestive Of Cancer Camouflage Histology Text: There was a dense lymphocytic infiltrate which prevented adequate histologic evaluation of adjacent structures.
Ear Star Wedge Flap Text: The defect edges were debeveled with a #15 blade scalpel.  Given the location of the defect and the proximity to free margins (helical rim) an ear star wedge flap was deemed most appropriate.  Using a sterile surgical marker, the appropriate flap was drawn incorporating the defect and placing the expected incisions between the helical rim and antihelix where possible.  The area thus outlined was incised through and through with a #15 scalpel blade.
Posterior Auricular Interpolation Flap Text: A decision was made to reconstruct the defect utilizing an interpolation axial flap and a staged reconstruction.  A telfa template was made of the defect.  This telfa template was then used to outline the posterior auricular interpolation flap.  The donor area for the pedicle flap was then injected with anesthesia.  The flap was excised through the skin and subcutaneous tissue down to the layer of the underlying musculature.  The pedicle flap was carefully excised within this deep plane to maintain its blood supply.  The edges of the donor site were undermined.   The donor site was closed in a primary fashion.  The pedicle was then rotated into position and sutured.  Once the tube was sutured into place, adequate blood supply was confirmed with blanching and refill.  The pedicle was then wrapped with xeroform gauze and dressed appropriately with a telfa and gauze bandage to ensure continued blood supply and protect the attached pedicle.
Banner Transposition Flap Text: The defect edges were debeveled with a #15 scalpel blade.  Given the location of the defect and the proximity to free margins a Banner transposition flap was deemed most appropriate.  Using a sterile surgical marker, an appropriate flap drawn around the defect. The area thus outlined was incised deep to adipose tissue with a #15 scalpel blade.  The skin margins were undermined to an appropriate distance in all directions utilizing iris scissors.
Advancement-Rotation Flap Text: The defect edges were debeveled with a #15 scalpel blade.  Given the location of the defect, shape of the defect and the proximity to free margins an advancement-rotation flap was deemed most appropriate.  Using a sterile surgical marker, an appropriate flap was drawn incorporating the defect and placing the expected incisions within the relaxed skin tension lines where possible. The area thus outlined was incised deep to adipose tissue with a #15 scalpel blade.  The skin margins were undermined to an appropriate distance in all directions utilizing iris scissors.
Closure 2 Information: This tab is for additional flaps and grafts, including complex repair and grafts and complex repair and flaps. You can also specify a different location for the additional defect, if the location is the same you do not need to select a new one. We will insert the automated text for the repair you select below just as we do for solitary flaps and grafts. Please note that at this time if you select a location with a different insurance zone you will need to override the ICD10 and CPT if appropriate.
Location Indication Override (Is Already Calculated Based On Selected Body Location): Area H
Consent (Lip)/Introductory Paragraph: The rationale for Mohs was explained to the patient and consent was obtained. The risks, benefits and alternatives to therapy were discussed in detail. Specifically, the risks of lip deformity, changes in the oral aperture, infection, scarring, bleeding, prolonged wound healing, incomplete removal, allergy to anesthesia, nerve injury and recurrence were addressed. Prior to the procedure, the treatment site was clearly identified and confirmed by the patient. All components of Universal Protocol/PAUSE Rule completed.
Complex Repair And Flap Additional Text (Will Appearing After The Standard Complex Repair Text): The complex repair was not sufficient to completely close the primary defect. The remaining additional defect was repaired with the flap mentioned below.
Alar Island Pedicle Flap Text: The defect edges were debeveled with a #15 scalpel blade.  Given the location of the defect, shape of the defect and the proximity to the alar rim an island pedicle advancement flap was deemed most appropriate.  Using a sterile surgical marker, an appropriate advancement flap was drawn incorporating the defect, outlining the appropriate donor tissue and placing the expected incisions within the nasal ala running parallel to the alar rim. The area thus outlined was incised with a #15 scalpel blade.  The skin margins were undermined minimally to an appropriate distance in all directions around the primary defect and laterally outward around the island pedicle utilizing iris scissors.  There was minimal undermining beneath the pedicle flap.
Dorsal Nasal Flap Text: The defect edges were debeveled with a #15 scalpel blade.  Given the location of the defect and the proximity to free margins a dorsal nasal flap,based upon the glabellar folds, was deemed most appropriate.  Using a sterile surgical marker, an appropriate dorsal nasal flap was drawn around the defect.    The area thus outlined was incised deep to adipose tissue with a #15 scalpel blade.  The skin margins were undermined to an appropriate distance in all directions utilizing iris scissors.
Graft Cartilage Fenestration Text: The cartilage was fenestrated with a 2mm punch biopsy to help facilitate graft survival and healing.
Wound Care: Aquaphor
Suturegard Body: The suture ends were repeatedly re-tightened and re-clamped to achieve the desired tissue expansion.
Ear Wedge Repair Text: A wedge excision was completed by carrying down an excision through the full thickness of the ear and cartilage with an inward facing Burow's triangle. The wound was then closed in a layered fashion.
O-T Plasty Text: The defect edges were debeveled with a #15 scalpel blade.  Given the location of the defect, shape of the defect and the proximity to free margins an O-T plasty was deemed most appropriate.  Using a sterile surgical marker, an appropriate O-T plasty was drawn incorporating the defect and placing the expected incisions within the relaxed skin tension lines where possible.    The area thus outlined was incised deep to adipose tissue with a #15 scalpel blade.  The skin margins were undermined to an appropriate distance in all directions utilizing iris scissors.
Unique Flap 3 Name: Mercedes Flap
Hatchet Flap Text: The defect edges were debeveled with a #15 scalpel blade.  Given the location of the defect, shape of the defect and the proximity to free margins a hatchet flap based from the glabella was deemed most appropriate.  Using a sterile surgical marker, an appropriate glabellar hatchet flap was drawn incorporating the defect and placing the expected incisions within the relaxed skin tension lines where possible.    The area thus outlined was incised deep to adipose tissue with a #15 scalpel blade.  The skin margins were undermined to an appropriate distance in all directions utilizing iris scissors.
Surgeon/Pathologist Verbiage (Will Incorporate Name Of Surgeon From Intro If Not Blank): operated in two distinct and integrated capacities as the surgeon and pathologist.
Graft Basting Suture (Optional): 5-0 Fast Absorbing Gut
Epidermal Sutures: 5-0 Ethilon
Cartilage Graft Text: The defect edges were debeveled with a #15 scalpel blade.  Given the location of the defect, shape of the defect, the fact the defect involved a full thickness cartilage defect a cartilage graft was deemed most appropriate.  An appropriate donor site was identified, cleansed, and anesthetized. The cartilage graft was then harvested and transferred to the recipient site, oriented appropriately and then sutured into place.  The secondary defect was then repaired using a primary closure.
Composite Graft Text: The defect edges were debeveled with a #15 scalpel blade.  Given the location of the defect, shape of the defect, the proximity to free margins and the fact the defect was full thickness a composite graft was deemed most appropriate.  The defect was outline and then transferred to the donor site.  A full thickness graft was then excised from the donor site. The graft was then placed in the primary defect, oriented appropriately and then sutured into place.  The secondary defect was then repaired using a primary closure.
H Plasty Text: Given the location of the defect, shape of the defect and the proximity to free margins a H-plasty was deemed most appropriate for repair.  Using a sterile surgical marker, the appropriate advancement arms of the H-plasty were drawn incorporating the defect and placing the expected incisions within the relaxed skin tension lines where possible. The area thus outlined was incised deep to adipose tissue with a #15 scalpel blade. The skin margins were undermined to an appropriate distance in all directions utilizing iris scissors.  The opposing advancement arms were then advanced into place in opposite direction and anchored with interrupted buried subcutaneous sutures.
Unique Flap 4 Text: The defect edges were debeveled with a #15 scalpel blade.  Given the location of the defect and the proximity to free margins a Banner transposition flap was deemed most appropriate.  Using a sterile surgical marker, an appropriate Banner transposition flap was drawn incorporating the defect.    The area thus outlined was incised deep to adipose tissue with a #15 scalpel blade.  The skin margins were undermined to an appropriate distance in all directions utilizing iris scissors.
Postop Diagnosis: same
Muscle Hinge Flap Text: The defect edges were debeveled with a #15 scalpel blade.  Given the size, depth and location of the defect and the proximity to free margins a muscle hinge flap was deemed most appropriate.  Using a sterile surgical marker, an appropriate hinge flap was drawn incorporating the defect. The area thus outlined was incised with a #15 scalpel blade.  The skin margins were undermined to an appropriate distance in all directions utilizing iris scissors.
X Size Of Lesion In Cm (Optional): 0.2
Mohs Rapid Report Verbiage: The area of clinically evident tumor was marked with skin marking ink and appropriately hatched.  The initial incision was made following the Mohs approach through the skin.  The specimen was taken to the lab, divided into the necessary number of pieces, chromacoded and processed according to the Mohs protocol.  This was repeated in successive stages until a tumor free defect was achieved.
Consent 2/Introductory Paragraph: Mohs surgery was explained to the patient and consent was obtained. The risks, benefits and alternatives to therapy were discussed in detail. Specifically, the risks of infection, scarring, bleeding, prolonged wound healing, incomplete removal, allergy to anesthesia, nerve injury and recurrence were addressed. Prior to the procedure, the treatment site was clearly identified and confirmed by the patient. All components of Universal Protocol/PAUSE Rule completed.
Advancement Flap (Single) Text: The defect edges were debeveled with a #15 scalpel blade.  Given the location of the defect and the proximity to free margins a single advancement flap was deemed most appropriate.  Using a sterile surgical marker, an appropriate advancement flap was drawn incorporating the defect and placing the expected incisions within the relaxed skin tension lines where possible.    The area thus outlined was incised deep to adipose tissue with a #15 scalpel blade.  The skin margins were undermined to an appropriate distance in all directions utilizing iris scissors.
Surgical Defect Width In Cm (Optional): 0.9
Suturegard Retention Suture: 2-0 Nylon
Suture Removal: 7 days
Tissue Cultured Epidermal Autograft Text: The defect edges were debeveled with a #15 scalpel blade.  Given the location of the defect, shape of the defect and the proximity to free margins a tissue cultured epidermal autograft was deemed most appropriate.  The graft was then trimmed to fit the size of the defect.  The graft was then placed in the primary defect and oriented appropriately.
Repair Type: Flap
Bi-Rhombic Flap Text: The defect edges were debeveled with a #15 scalpel blade.  Given the location of the defect and the proximity to free margins a bi-rhombic flap was deemed most appropriate.  Using a sterile surgical marker, an appropriate rhombic flap was drawn incorporating the defect. The area thus outlined was incised deep to adipose tissue with a #15 scalpel blade.  The skin margins were undermined to an appropriate distance in all directions utilizing iris scissors.
Area M Indication Text: Tumors in this location are included in Area M (cheek, forehead, scalp, neck, jawline and pretibial skin).  Mohs surgery is indicated for tumors in these anatomic locations.
Cheek-To-Nose Interpolation Flap Text: A decision was made to reconstruct the defect utilizing an interpolation axial flap and a staged reconstruction.  A telfa template was made of the defect.  This telfa template was then used to outline the Cheek-To-Nose Interpolation flap.  The donor area for the pedicle flap was then injected with anesthesia.  The flap was excised through the skin and subcutaneous tissue down to the layer of the underlying musculature.  The interpolation flap was carefully excised within this deep plane to maintain its blood supply.  The edges of the donor site were undermined.   The donor site was closed in a primary fashion.  The pedicle was then rotated into position and sutured.  Once the tube was sutured into place, adequate blood supply was confirmed with blanching and refill.  The pedicle was then wrapped with xeroform gauze and dressed appropriately with a telfa and gauze bandage to ensure continued blood supply and protect the attached pedicle.
A-T Advancement Flap Text: The defect edges were debeveled with a #15 scalpel blade.  Given the location of the defect, shape of the defect and the proximity to free margins an A-T advancement flap was deemed most appropriate.  Using a sterile surgical marker, an appropriate advancement flap was drawn incorporating the defect and placing the expected incisions within the relaxed skin tension lines where possible.    The area thus outlined was incised deep to adipose tissue with a #15 scalpel blade.  The skin margins were undermined to an appropriate distance in all directions utilizing iris scissors.
Length To Time In Minutes Device Was In Place: 10
Consent (Spinal Accessory)/Introductory Paragraph: The rationale for Mohs was explained to the patient and consent was obtained. The risks, benefits and alternatives to therapy were discussed in detail. Specifically, the risks of damage to the spinal accessory nerve, infection, scarring, bleeding, prolonged wound healing, incomplete removal, allergy to anesthesia, and recurrence were addressed. Prior to the procedure, the treatment site was clearly identified and confirmed by the patient. All components of Universal Protocol/PAUSE Rule completed.
Partial Purse String (Simple) Text: Given the location of the defect and the characteristics of the surrounding skin a simple purse string closure was deemed most appropriate.  Undermining was performed circumfirentially around the surgical defect.  A purse string suture was then placed and tightened. Wound tension only allowed a partial closure of the circular defect.
Partial Purse String (Intermediate) Text: Given the location of the defect and the characteristics of the surrounding skin an intermediate purse string closure was deemed most appropriate.  Undermining was performed circumfirentially around the surgical defect.  A purse string suture was then placed and tightened. Wound tension only allowed a partial closure of the circular defect.
Paramedian Forehead Flap Text: A decision was made to reconstruct the defect utilizing an interpolation axial flap and a staged reconstruction.  A telfa template was made of the defect.  This telfa template was then used to outline the paramedian forehead pedicle flap.  The donor area for the pedicle flap was then injected with anesthesia.  The flap was excised through the skin and subcutaneous tissue down to the layer of the underlying musculature.  The pedicle flap was carefully excised within this deep plane to maintain its blood supply.  The edges of the donor site were undermined.   The donor site was closed in a primary fashion.  The pedicle was then rotated into position and sutured.  Once the tube was sutured into place, adequate blood supply was confirmed with blanching and refill.  The pedicle was then wrapped with xeroform gauze and dressed appropriately with a telfa and gauze bandage to ensure continued blood supply and protect the attached pedicle.
Bilobed Flap Text: The defect edges were debeveled with a #15 scalpel blade.  Given the location of the defect and the proximity to free margins a bilobe flap was deemed most appropriate.  Using a sterile surgical marker, an appropriate bilobe flap drawn around the defect.    The area thus outlined was incised deep to adipose tissue with a #15 scalpel blade.  The skin margins were undermined to an appropriate distance in all directions utilizing iris scissors.
Consent Type: Consent 1 (Standard)
Mercedes Flap Text: The defect edges were debeveled with a #15 scalpel blade.  Given the location of the defect, shape of the defect and the proximity to free margins a Mercedes flap was deemed most appropriate.  Using a sterile surgical marker, an appropriate advancement flap was drawn incorporating the defect and placing the expected incisions within the relaxed skin tension lines where possible. The area thus outlined was incised deep to adipose tissue with a #15 scalpel blade.  The skin margins were undermined to an appropriate distance in all directions utilizing iris scissors.
Consent (Scalp)/Introductory Paragraph: The rationale for Mohs was explained to the patient and consent was obtained. The risks, benefits and alternatives to therapy were discussed in detail. Specifically, the risks of changes in hair growth pattern secondary to repair, infection, scarring, bleeding, prolonged wound healing, incomplete removal, allergy to anesthesia, nerve injury and recurrence were addressed. Prior to the procedure, the treatment site was clearly identified and confirmed by the patient. All components of Universal Protocol/PAUSE Rule completed.
Donor Site Anesthesia Type: same as repair anesthesia
Double Island Pedicle Flap Text: The defect edges were debeveled with a #15 scalpel blade.  Given the location of the defect, shape of the defect and the proximity to free margins a double island pedicle advancement flap was deemed most appropriate.  Using a sterile surgical marker, an appropriate advancement flap was drawn incorporating the defect, outlining the appropriate donor tissue and placing the expected incisions within the relaxed skin tension lines where possible.    The area thus outlined was incised deep to adipose tissue with a #15 scalpel blade.  The skin margins were undermined to an appropriate distance in all directions around the primary defect and laterally outward around the island pedicle utilizing iris scissors.  There was minimal undermining beneath the pedicle flap.
Information: Selecting Yes will display possible errors in your note based on the variables you have selected. This validation is only offered as a suggestion for you. PLEASE NOTE THAT THE VALIDATION TEXT WILL BE REMOVED WHEN YOU FINALIZE YOUR NOTE. IF YOU WANT TO FAX A PRELIMINARY NOTE YOU WILL NEED TO TOGGLE THIS TO 'NO' IF YOU DO NOT WANT IT IN YOUR FAXED NOTE.
Complex Repair And Graft Additional Text (Will Appearing After The Standard Complex Repair Text): The complex repair was not sufficient to completely close the primary defect. The remaining additional defect was repaired with the graft mentioned below.
Island Pedicle Flap Text: The defect edges were debeveled with a #15 scalpel blade.  Given the location of the defect, shape of the defect and the proximity to free margins an island pedicle advancement flap was deemed most appropriate.  Using a sterile surgical marker, an appropriate advancement flap was drawn incorporating the defect, outlining the appropriate donor tissue and placing the expected incisions within the relaxed skin tension lines where possible.    The area thus outlined was incised deep to adipose tissue with a #15 scalpel blade.  The skin margins were undermined to an appropriate distance in all directions around the primary defect and laterally outward around the island pedicle utilizing iris scissors.  There was minimal undermining beneath the pedicle flap.
Full Thickness Lip Wedge Repair (Flap) Text: Given the location of the defect and the proximity to free margins a full thickness wedge repair was deemed most appropriate.  Using a sterile surgical marker, the appropriate repair was drawn incorporating the defect and placing the expected incisions perpendicular to the vermilion border.  The vermilion border was also meticulously outlined to ensure appropriate reapproximation during the repair.  The area thus outlined was incised through and through with a #15 scalpel blade.  The muscularis and dermis were reaproximated with deep sutures following hemostasis. Care was taken to realign the vermilion border before proceeding with the superficial closure.  Once the vermilion was realigned the superfical and mucosal closure was finished.
Secondary Intention Text (Leave Blank If You Do Not Want): The defect will heal with secondary intention.
Secondary Defect Length In Cm (Required For Flaps): 2.8
Mohs Histo Method Verbiage: Each section was then chromacoded and processed in the Mohs lab using the Mohs protocol and submitted for frozen section.
Unique Flap 4 Name: Banner Flap
O-Z Plasty Text: The defect edges were debeveled with a #15 scalpel blade.  Given the location of the defect, shape of the defect and the proximity to free margins an O-Z plasty (double transposition flap) was deemed most appropriate.  Using a sterile surgical marker, the appropriate transposition flaps were drawn incorporating the defect and placing the expected incisions within the relaxed skin tension lines where possible.    The area thus outlined was incised deep to adipose tissue with a #15 scalpel blade.  The skin margins were undermined to an appropriate distance in all directions utilizing iris scissors.  Hemostasis was achieved with electrocautery.  The flaps were then transposed into place, one clockwise and the other counterclockwise, and anchored with interrupted buried subcutaneous sutures.
Rotation Flap Text: The defect edges were debeveled with a #15 scalpel blade.  Given the location of the defect, shape of the defect and the proximity to free margins a rotation flap was deemed most appropriate.  Using a sterile surgical marker, an appropriate rotation flap was drawn incorporating the defect and placing the expected incisions within the relaxed skin tension lines where possible.    The area thus outlined was incised deep to adipose tissue with a #15 scalpel blade.  The skin margins were undermined to an appropriate distance in all directions utilizing iris scissors.
Hemostasis: Electrocautery
Bcc Histology Text: There were numerous aggregates of basaloid cells.
Wound Care (No Sutures): Petrolatum
Medical Necessity Statement: Based on my medical judgement, Mohs surgery is the most appropriate treatment for this cancer compared to other treatments.
Epidermal Autograft Text: The defect edges were debeveled with a #15 scalpel blade.  Given the location of the defect, shape of the defect and the proximity to free margins an epidermal autograft was deemed most appropriate.  Using a sterile surgical marker, the primary defect shape was transferred to the donor site. The epidermal graft was then harvested.  The skin graft was then placed in the primary defect and oriented appropriately.
W Plasty Text: The lesion was extirpated to the level of the fat with a #15 scalpel blade.  Given the location of the defect, shape of the defect and the proximity to free margins a W-plasty was deemed most appropriate for repair.  Using a sterile surgical marker, the appropriate transposition arms of the W-plasty were drawn incorporating the defect and placing the expected incisions within the relaxed skin tension lines where possible.    The area thus outlined was incised deep to adipose tissue with a #15 scalpel blade.  The skin margins were undermined to an appropriate distance in all directions utilizing iris scissors.  The opposing transposition arms were then transposed into place in opposite direction and anchored with interrupted buried subcutaneous sutures.
Manual Repair Warning Statement: We plan on removing the manually selected variable below in favor of our much easier automatic structured text blocks found in the previous tab. We decided to do this to help make the flow better and give you the full power of structured data. Manual selection is never going to be ideal in our platform and I would encourage you to avoid using manual selection from this point on, especially since I will be sunsetting this feature. It is important that you do one of two things with the customized text below. First, you can save all of the text in a word file so you can have it for future reference. Second, transfer the text to the appropriate area in the Library tab. Lastly, if there is a flap or graft type which we do not have you need to let us know right away so I can add it in before the variable is hidden. No need to panic, we plan to give you roughly 6 months to make the change.
Number Of Stages: 2
Melolabial Transposition Flap Text: The defect edges were debeveled with a #15 scalpel blade.  Given the location of the defect and the proximity to free margins a melolabial flap was deemed most appropriate.  Using a sterile surgical marker, an appropriate melolabial transposition flap was drawn incorporating the defect.    The area thus outlined was incised deep to adipose tissue with a #15 scalpel blade.  The skin margins were undermined to an appropriate distance in all directions utilizing iris scissors.
Advancement Flap (Double) Text: The defect edges were debeveled with a #15 scalpel blade.  Given the location of the defect and the proximity to free margins a double advancement flap was deemed most appropriate.  Using a sterile surgical marker, the appropriate advancement flaps were drawn incorporating the defect and placing the expected incisions within the relaxed skin tension lines where possible.    The area thus outlined was incised deep to adipose tissue with a #15 scalpel blade.  The skin margins were undermined to an appropriate distance in all directions utilizing iris scissors.
Consent 3/Introductory Paragraph: I gave the patient a chance to ask questions they had about the procedure.  Following this I explained the Mohs procedure and consent was obtained. The risks, benefits and alternatives to therapy were discussed in detail. Specifically, the risks of infection, scarring, bleeding, prolonged wound healing, incomplete removal, allergy to anesthesia, nerve injury and recurrence were addressed. Prior to the procedure, the treatment site was clearly identified and confirmed by the patient. All components of Universal Protocol/PAUSE Rule completed.
Graft Donor Site Epidermal Sutures (Optional): 5-0 Ethibond
Body Location Override (Optional - Billing Will Still Be Based On Selected Body Map Location If Applicable): left medial zygoma
Xenograft Text: The defect edges were debeveled with a #15 scalpel blade.  Given the location of the defect, shape of the defect and the proximity to free margins a xenograft was deemed most appropriate.  The graft was then trimmed to fit the size of the defect.  The graft was then placed in the primary defect and oriented appropriately.
Helical Rim Advancement Flap Text: The defect edges were debeveled with a #15 blade scalpel.  Given the location of the defect and the proximity to free margins (helical rim) a double helical rim advancement flap was deemed most appropriate.  Using a sterile surgical marker, the appropriate advancement flaps were drawn incorporating the defect and placing the expected incisions between the helical rim and antihelix where possible.  The area thus outlined was incised through and through with a #15 scalpel blade.  With a skin hook and iris scissors, the flaps were gently and sharply undermined and freed up.
Area L Indication Text: Tumors in this location are included in Area L (trunk and extremities).  Mohs surgery is indicated for larger tumors, or tumors with aggressive histologic features, in these anatomic locations.
Epidermal Closure: running cuticular
Graft Donor Site Bandage (Optional-Leave Blank If You Don't Want In Note): Aquaphor and telefa placed on wound. Pressure dressing applied to donor site
Consent (Near Eyelid Margin)/Introductory Paragraph: The rationale for Mohs was explained to the patient and consent was obtained. The risks, benefits and alternatives to therapy were discussed in detail. Specifically, the risks of ectropion or eyelid deformity, infection, scarring, bleeding, prolonged wound healing, incomplete removal, allergy to anesthesia, nerve injury and recurrence were addressed. Prior to the procedure, the treatment site was clearly identified and confirmed by the patient. All components of Universal Protocol/PAUSE Rule completed.
Interpolation Flap Text: A decision was made to reconstruct the defect utilizing an interpolation axial flap and a staged reconstruction.  A telfa template was made of the defect.  This telfa template was then used to outline the interpolation flap.  The donor area for the pedicle flap was then injected with anesthesia.  The flap was excised through the skin and subcutaneous tissue down to the layer of the underlying musculature.  The interpolation flap was carefully excised within this deep plane to maintain its blood supply.  The edges of the donor site were undermined.   The donor site was closed in a primary fashion.  The pedicle was then rotated into position and sutured.  Once the tube was sutured into place, adequate blood supply was confirmed with blanching and refill.  The pedicle was then wrapped with xeroform gauze and dressed appropriately with a telfa and gauze bandage to ensure continued blood supply and protect the attached pedicle.
Same Histology In Subsequent Stages Text: The pattern and morphology of the tumor is as described in the first stage.
O-T Advancement Flap Text: The defect edges were debeveled with a #15 scalpel blade.  Given the location of the defect, shape of the defect and the proximity to free margins an O-T advancement flap was deemed most appropriate.  Using a sterile surgical marker, an appropriate advancement flap was drawn incorporating the defect and placing the expected incisions within the relaxed skin tension lines where possible.    The area thus outlined was incised deep to adipose tissue with a #15 scalpel blade.  The skin margins were undermined to an appropriate distance in all directions utilizing iris scissors.
Previous Accession (Optional): [id number]

## 2019-04-23 DIAGNOSIS — D68.59 PROTEIN S DEFICIENCY (HCC): ICD-10-CM

## 2019-04-24 NOTE — TELEPHONE ENCOUNTER
Pt met protocol?: Yes pt last ov 12/18   INR   Date Value Ref Range Status   10/27/2017 2.8  Final     POC INR   Date Value Ref Range Status   03/10/2016 2.7 (H) 0.9 - 1.2 Final     Comment:     INR - Non-therapeutic Reference Range: 0.9-1.2  INR - Therapeutic Reference Range: 2.0-4.0       Lab Results   Component Value Date/Time    SODIUM 137 12/14/2018 03:53 AM    POTASSIUM 4.2 12/14/2018 03:53 AM    CHLORIDE 106 12/14/2018 03:53 AM    CO2 27 12/14/2018 03:53 AM    GLUCOSE 148 (H) 12/14/2018 03:53 AM    BUN 11 12/14/2018 03:53 AM    CREATININE 0.63 12/14/2018 03:53 AM

## 2019-04-29 ENCOUNTER — APPOINTMENT (RX ONLY)
Dept: URBAN - METROPOLITAN AREA CLINIC 36 | Facility: CLINIC | Age: 84
Setting detail: DERMATOLOGY
End: 2019-04-29

## 2019-04-29 DIAGNOSIS — Z48.02 ENCOUNTER FOR REMOVAL OF SUTURES: ICD-10-CM

## 2019-04-29 PROCEDURE — ? SUTURE REMOVAL (GLOBAL PERIOD)

## 2019-04-29 PROCEDURE — 99024 POSTOP FOLLOW-UP VISIT: CPT

## 2019-04-29 ASSESSMENT — LOCATION DETAILED DESCRIPTION DERM: LOCATION DETAILED: LEFT SUPERIOR LATERAL MALAR CHEEK

## 2019-04-29 ASSESSMENT — LOCATION ZONE DERM: LOCATION ZONE: FACE

## 2019-04-29 ASSESSMENT — LOCATION SIMPLE DESCRIPTION DERM: LOCATION SIMPLE: LEFT CHEEK

## 2019-04-29 NOTE — PROCEDURE: SUTURE REMOVAL (GLOBAL PERIOD)
Detail Level: Detailed
Body Location Override (Optional - Billing Will Still Be Based On Selected Body Map Location If Applicable): left zygoma
Add 35071 Cpt? (Important Note: In 2017 The Use Of 96514 Is Being Tracked By Cms To Determine Future Global Period Reimbursement For Global Periods): yes

## 2019-05-02 ENCOUNTER — HOSPITAL ENCOUNTER (OUTPATIENT)
Dept: LAB | Facility: MEDICAL CENTER | Age: 84
End: 2019-05-02
Attending: INTERNAL MEDICINE
Payer: MEDICARE

## 2019-05-02 ENCOUNTER — OFFICE VISIT (OUTPATIENT)
Dept: MEDICAL GROUP | Facility: PHYSICIAN GROUP | Age: 84
End: 2019-05-02
Payer: MEDICARE

## 2019-05-02 ENCOUNTER — OFFICE VISIT (OUTPATIENT)
Dept: RHEUMATOLOGY | Facility: MEDICAL CENTER | Age: 84
End: 2019-05-02
Payer: MEDICARE

## 2019-05-02 VITALS
DIASTOLIC BLOOD PRESSURE: 80 MMHG | HEART RATE: 74 BPM | SYSTOLIC BLOOD PRESSURE: 128 MMHG | BODY MASS INDEX: 29.21 KG/M2 | RESPIRATION RATE: 12 BRPM | WEIGHT: 164.9 LBS | OXYGEN SATURATION: 95 % | TEMPERATURE: 96.8 F

## 2019-05-02 VITALS
RESPIRATION RATE: 16 BRPM | TEMPERATURE: 98.4 F | HEIGHT: 63 IN | HEART RATE: 56 BPM | DIASTOLIC BLOOD PRESSURE: 74 MMHG | SYSTOLIC BLOOD PRESSURE: 122 MMHG | OXYGEN SATURATION: 96 % | BODY MASS INDEX: 29.06 KG/M2 | WEIGHT: 164 LBS

## 2019-05-02 DIAGNOSIS — R19.7 DIARRHEA, UNSPECIFIED TYPE: ICD-10-CM

## 2019-05-02 DIAGNOSIS — M81.0 OSTEOPOROSIS WITHOUT CURRENT PATHOLOGICAL FRACTURE, UNSPECIFIED OSTEOPOROSIS TYPE: ICD-10-CM

## 2019-05-02 DIAGNOSIS — Z79.01 CHRONIC ANTICOAGULATION: ICD-10-CM

## 2019-05-02 DIAGNOSIS — M05.79 RHEUMATOID ARTHRITIS INVOLVING MULTIPLE SITES WITH POSITIVE RHEUMATOID FACTOR (HCC): ICD-10-CM

## 2019-05-02 DIAGNOSIS — C18.9 ADENOCARCINOMA OF COLON (HCC): ICD-10-CM

## 2019-05-02 DIAGNOSIS — I10 ESSENTIAL HYPERTENSION, BENIGN: ICD-10-CM

## 2019-05-02 DIAGNOSIS — Z01.84 IMMUNITY STATUS TESTING: ICD-10-CM

## 2019-05-02 DIAGNOSIS — Z15.09 LYNCH SYNDROME: ICD-10-CM

## 2019-05-02 DIAGNOSIS — Z79.52 LONG TERM CURRENT USE OF SYSTEMIC STEROIDS: ICD-10-CM

## 2019-05-02 DIAGNOSIS — C43.9 MALIGNANT MELANOMA, UNSPECIFIED SITE (HCC): ICD-10-CM

## 2019-05-02 DIAGNOSIS — Z79.899 LONG-TERM USE OF PLAQUENIL: ICD-10-CM

## 2019-05-02 LAB
C DIFF DNA SPEC QL NAA+PROBE: NEGATIVE
C DIFF TOX GENS STL QL NAA+PROBE: NEGATIVE

## 2019-05-02 PROCEDURE — 36415 COLL VENOUS BLD VENIPUNCTURE: CPT

## 2019-05-02 PROCEDURE — 87045 FECES CULTURE AEROBIC BACT: CPT

## 2019-05-02 PROCEDURE — 87493 C DIFF AMPLIFIED PROBE: CPT

## 2019-05-02 PROCEDURE — 99214 OFFICE O/P EST MOD 30 MIN: CPT | Performed by: INTERNAL MEDICINE

## 2019-05-02 PROCEDURE — 86762 RUBELLA ANTIBODY: CPT

## 2019-05-02 PROCEDURE — 87046 STOOL CULTR AEROBIC BACT EA: CPT | Mod: 59

## 2019-05-02 PROCEDURE — 86735 MUMPS ANTIBODY: CPT

## 2019-05-02 PROCEDURE — 87899 AGENT NOS ASSAY W/OPTIC: CPT

## 2019-05-02 PROCEDURE — 86765 RUBEOLA ANTIBODY: CPT

## 2019-05-02 RX ORDER — HYDROXYCHLOROQUINE SULFATE 200 MG/1
TABLET, FILM COATED ORAL
Qty: 60 TAB | Refills: 0 | Status: SHIPPED | OUTPATIENT
Start: 2019-05-02 | End: 2019-06-06 | Stop reason: SDUPTHER

## 2019-05-02 RX ORDER — OMEPRAZOLE 20 MG/1
20 CAPSULE, DELAYED RELEASE ORAL DAILY
COMMUNITY
Start: 2019-02-13 | End: 2021-05-24 | Stop reason: SDUPTHER

## 2019-05-02 RX ORDER — METHYLPREDNISOLONE 4 MG/1
TABLET ORAL
Qty: 30 TAB | Refills: 0 | Status: SHIPPED | OUTPATIENT
Start: 2019-05-02 | End: 2019-12-02

## 2019-05-02 RX ORDER — LIDOCAINE 50 MG/G
1 PATCH TOPICAL EVERY 24 HOURS
Qty: 90 PATCH | Refills: 0 | Status: SHIPPED | OUTPATIENT
Start: 2019-05-02 | End: 2019-12-10 | Stop reason: SDUPTHER

## 2019-05-02 ASSESSMENT — PATIENT HEALTH QUESTIONNAIRE - PHQ9: CLINICAL INTERPRETATION OF PHQ2 SCORE: 0

## 2019-05-02 NOTE — LETTER
[facility]   [address] [place] [address], [address]  [place] [address]  Phone: [phone]  Fax: [phone]              Encounter Date: 5/2/2019    Dear Dr. Eaton ref. provider found,    It was a pleasure seeing your patient, Marry Mathur, on 5/2/2019. Diagnoses of Rheumatoid arthritis involving multiple sites with positive rheumatoid factor (HCC), Long-term use of Plaquenil, Long term current use of systemic steroids, Adenocarcinoma of colon (HCC), Osteoporosis without current pathological fracture, unspecified osteoporosis type, Malignant melanoma, unspecified site (HCC), Chronic anticoagulation, and Essential hypertension, benign were pertinent to this visit.     Please find attached progress note which includes the history I obtained from Ms. Mathur, my physical examination findings, my impression and recommendations.      Once again, it was a pleasure participating in your patient's care.  Please feel free to contact me if you have any questions or if I can be of any further assistance to your patients.      Sincerely,    Deanna Escoto M.D.  Electronically Signed          PROGRESS NOTE:  No notes on file

## 2019-05-02 NOTE — PROGRESS NOTES
PRIMARY CARE CLINIC FOLLOW UP VISIT  Chief Complaint   Patient presents with   • Diarrhea     w/ nausea x 4 days    • Labs Only     MMR Titer        History of Present Illness     Diarrhea  [place] and by Monday upon returning she had diarrhea, nausea. She only had a [employer] while on land (in [facility]) but otherwise she didn't eat off of the ship. The diarrhea has now turned black and has a lot of gas. Has explosive gas and liquid diarrhea. Also has abdominal cramping. Denies fevers, chills. Felt nauseous for a bit. Has been drinking ginger tea to help with the nausea. Has been taking kayopectate.       Current Outpatient Prescriptions   Medication Sig Dispense Refill   • LYRICA 100 MG Cap      • omeprazole (PRILOSEC) 20 MG delayed-release capsule      • nystatin/triamcinolone (MYCOLOG) 772751-4.1 UNIT/GM-% Cream      • rivaroxaban (XARELTO) 20 MG Tab tablet Take 1 Tab by mouth with dinner. 90 Tab 1   • lidocaine (LIDODERM) 5 % Patch Apply 1 Patch to skin as directed every 24 hours. 90 Patch 0   • methylPREDNISolone (MEDROL) 4 MG Tab Take 1 Tab by mouth every bedtime. (Patient not taking: Reported on 5/2/2019) 30 Tab 1   • ondansetron (ZOFRAN ODT) 4 MG TABLET DISPERSIBLE Take 1 Tab by mouth every 6 hours as needed for Nausea. (Patient not taking: Reported on 5/2/2019) 12 Tab 0   • miconazole (MICOTIN) 2 % Cream Apply to the affected area twice a day. (Patient not taking: Reported on 5/2/2019) 1 Tube 1   • atorvastatin (LIPITOR) 10 MG Tab Take 10 mg by mouth every evening.     • omeprazole (PRILOSEC) 20 MG Tablet Delayed Response delayed-release tablet Take 1 Tab by mouth every morning.     • FLUOROURACIL EX 1 Each by Apply externally route as needed.     • Diphenhydramine-APAP, sleep, (TYLENOL PM EXTRA STRENGTH)  MG Tab Take 2 Tabs by mouth every bedtime.     • acetaminophen (TYLENOL) 500 MG Tab Take 1,000 mg by mouth every morning.     • Melatonin 10 MG  "Tab Take 1 Tab by mouth every bedtime.     • vitamin D (CHOLECALCIFEROL) 1000 UNIT Tab Take 1,000 Units by mouth every morning.     • POTASSIUM GLUCONATE Take 1 Tab by mouth every morning. Pt unsure of dose     • Calcium Carbonate-Vitamin D (CALCIUM + D PO) Take 1 Tab by mouth every bedtime.       No current facility-administered medications for this visit.      Past Medical History:   Diagnosis Date   • Adenocarcinoma of colon (HCC) 10/30/2018    From sessile serrated polyp 10/2018   • Anesthesia     pt states \"one new dr scraped my esophagus when they put the tube in and I started bleeding so they couldn't operate\"    • Atrial fibrillation [I48.91] 4/19/2016     pt denies    • Back pain 6/27/2012   • Blood clotting disorder (HCC) 2012    clot in leg   • Bowel habit changes     constipation    • Cancer (HCC)     skin, colon 2018   • Cataract     belia IOL    • Chronic back pain greater than 3 months duration    • Deep vein thrombosis (HCC) 3/8/2016    First occurrence in [facility] in late 1970s Second occurrence further up in [facility] in 2012, has been on AC since    • Essential hypertension, benign 6/27/2012   • GERD (gastroesophageal reflux disease) 6/27/2012   • Heart murmur    • Hyperlipidemia    • Hypertension     hx of, not currently    • Impaired fasting glucose 11/2/2017   • Mild aortic stenosis 11/2/2017   • Other and unspecified hyperlipidemia 6/27/2012   • Prediabetes    • Protein S deficiency (HCC) 11/2/2017    Noted in lab work 2013 as a part of work up at [facility]    • Rheumatoid arthritis involving multiple sites with positive rheumatoid factor (HCC) 03/14/2016    Dr. Escoto   • Rheumatoid nodule (HCC) 7/25/2017   • Right bundle branch block 6/27/2012    pt denies    • Urinary incontinence 11/2/2017     Past Surgical History:   Procedure Laterality Date   • HEMICOLECTOMY Right 12/13/2018    Procedure: OPEN RIGHT HEMICOLECTOMY;  Surgeon: Dash Bhagat M.D.;  Location: SURGERY [facility];  " "Service: General   • INGUINAL HERNIA REPAIR Right 2016    Procedure: INGUINAL HERNIA REPAIR - PRIMARY;  Surgeon: Mary Medina M.D.;  Location: SURGERY [facility];  Service:    • OTHER  2014    peroneal nerve surgery Dr. Castro    • OTHER ORTHOPEDIC SURGERY      left knee partial   • OTHER ORTHOPEDIC SURGERY      meniscus repair   • ATHROPLASTY      partial TKA with Dr. Persaud   • CHOLECYSTECTOMY     • HYSTERECTOMY, TOTAL ABDOMINAL  's   • ROTATOR CUFF REPAIR Bilateral      Social History   Substance Use Topics   • Smoking status: Never Smoker   • Smokeless tobacco: Never Used   • Alcohol use No     Social History     Social History Narrative    Retired from [employer]. 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 All other systems reviewed and negative.        Objective   /74   Pulse (!) 56   Temp 36.9 °C (98.4 °F)   Resp 16   Ht 1.6 m (5' 3\")   Wt 74.4 kg (164 lb)   SpO2 96%  Body mass index is 29.05 kg/m².    General: alert and oriented, pleasant, cooperative  HEENT: Normocephalic, atraumatic. Dry mucous membranes   Cardiovascular: regular rate and rhythm, normal S1/S2  Pulmonary: lungs clear to auscultation bilaterally  Gastrointestinal: mild tenderness to palpation. No hepatosplenomegaly.   Lymphatics: no cervical or supraclavicular lymphadenopathy   Skin: warm and dry, no lesions or rashes  Psychiatric: appropriate mood and affect. Good insight and appropriate judgment       Assessment and Plan   The following treatment plan was discussed     1. Diarrhea, unspecified type  Encouraged aggressive " hydration and bland diet. Rule out below causes:   - CULTURE STOOL; Future  - C DIFFICILE TOXINS A+B, EIA  - E COLI SHIGA TOXIN EIA  - CRYPTO/GIARDIA RAPID ASSAY; Future    2. Immunity status testing  - MEASLES/MUMPS/RUBELLA IMMUNITY; Future      Healthcare maintenance     Health Maintenance Due   Topic Date Due   • Annual Wellness Visit  1935       Return if symptoms worsen or fail to improve.    Eric Cook MD  Internal Medicine  [facility]

## 2019-05-02 NOTE — PROGRESS NOTES
Chief Complaint- joint pain    Subjective:   Marry Mathur is a 83 y.o. female here today for follow up of rheumatological issues    This is a follow-up visit for this patient who we see in this clinic for significant osteoarthritis, patient had been diagnosed with rheumatoid arthritis in the past status post a number of biologic valve which had been stopped because of the occurrence of number of cancers including recurring skin melanoma which patient continues to get treatment for and also history of colonic adenocarcinoma status post resection December 2019.      Patient in the process of being evaluated for a left CARLYLE.     Other issues include a finding of osteoporosis on patient's bone density scan from March 2018, patient was not able to tolerate Fosamax because of a history of Araya's esophagus, we did give patient information on Prolia injections at last visit patient states that she is willing to start something i.e. Prolia, other option may include Reclast.      Additional comorbidities include diabetes for which patient  takes metformin, also with a diagnosis of spinal stenosis with intermittent weakness in her legs with progressive numbness and weakness in the left leg.  Patient also with protein S deficiency with a history of DVT on chronic anticoagulation.  Patient also with a history of Araya's esophagus.        S/p Remicade-stopped because of hx of melanoma about 1996 and has multiple other skin cancers  S/p Orencia-lost efficacy  S/p Humira-stopped because of recurrence of melanoma October 2017  S/p MTX-stopped because of development of skin cancer/melanoma October 2017  S/p NSAIDS-relatively contraindicated as patient is on chronic anticoagulation   S/p xeljanz-stopped because of the development of colonic adenocarcinoma     DEXA 3/18/2016 T scores 1.1, -1.1   FRAX 3/18/2016 major osteoporotic fracture risk 14.3%, hip fracture risks 3.7%  DEXA 3/23/2018 T scores 0.2, -1.4  FRAX  3/23/2018 major osteoporotic fracture risk 19.3%, hip fracture risk 6.1%  Echocardiogram 7/2012 [facility]  RF neg 3/2014 [facility]; RF neg 6/2014 [facility]; RF neg 6/2016  CCP neg 3/2014 [facility]; CCP neg 6/2014 [facility]; CCP neg 6/2016  Uric acid 4.7 3/2014 [facility];Uric acid 4.5 6/2016  Hep B neg 6/2016  Quantiferon Gold neg 6/2014 [facility]; Quantiferon Gold neg 6/2016  Hand x-rays 3/2016-indicate osteoarthritis  Feet x-rays 3/2016-indicate osteoarthritis     Corticosteroid Therapy Informed Consent signed 1/17/2019-copy given to patient     Current medicines (including changes today)  Current Outpatient Prescriptions   Medication Sig Dispense Refill   • LYRICA 100 MG Cap      • omeprazole (PRILOSEC) 20 MG delayed-release capsule      • nystatin/triamcinolone (MYCOLOG) 809302-6.1 UNIT/GM-% Cream      • methylPREDNISolone (MEDROL) 4 MG Tab 1 tab po qday 30 Tab 0   • hydroxychloroquine (PLAQUENIL) 200 MG Tab 1 tab po bid 60 Tab 0   • lidocaine (LIDODERM) 5 % Patch Apply 1 Patch to skin as directed every 24 hours. 90 Patch 0   • rivaroxaban (XARELTO) 20 MG Tab tablet Take 1 Tab by mouth with dinner. 90 Tab 1   • ondansetron (ZOFRAN ODT) 4 MG TABLET DISPERSIBLE Take 1 Tab by mouth every 6 hours as needed for Nausea. 12 Tab 0   • atorvastatin (LIPITOR) 10 MG Tab Take 10 mg by mouth every evening.     • omeprazole (PRILOSEC) 20 MG Tablet Delayed Response delayed-release tablet Take 1 Tab by mouth every morning.     • Diphenhydramine-APAP, sleep, (TYLENOL PM EXTRA STRENGTH)  MG Tab Take 2 Tabs by mouth every bedtime.     • acetaminophen (TYLENOL) 500 MG Tab Take 1,000 mg by mouth every morning.     • Melatonin 10 MG Tab Take 1 Tab by mouth every bedtime.     • vitamin D (CHOLECALCIFEROL) 1000 UNIT Tab Take 1,000 Units by mouth every morning.     • POTASSIUM GLUCONATE Take 1 Tab by mouth every morning. Pt unsure of dose     • Calcium Carbonate-Vitamin D (CALCIUM + D PO) Take 1 Tab by mouth every  bedtime.     • methylPREDNISolone (MEDROL) 4 MG Tab Take 1 Tab by mouth every bedtime. (Patient not taking: Reported on 5/2/2019) 30 Tab 1   • miconazole (MICOTIN) 2 % Cream Apply to the affected area twice a day. (Patient not taking: Reported on 5/2/2019) 1 Tube 1   • FLUOROURACIL EX 1 Each by Apply externally route as needed.       No current facility-administered medications for this visit.      She  has a past medical history of Adenocarcinoma of colon ([facility]) (10/30/2018); Anesthesia; Atrial fibrillation [I48.91] (4/19/2016); Back pain (6/27/2012); Blood clotting disorder ([facility]) (2012); Bowel habit changes; Cancer ([facility]); Cataract; Chronic back pain greater than 3 months duration; Deep vein thrombosis ([facility]) (3/8/2016); Essential hypertension, benign (6/27/2012); GERD (gastroesophageal reflux disease) (6/27/2012); Heart murmur; Hyperlipidemia; Hypertension; Impaired fasting glucose (11/2/2017); Mild aortic stenosis (11/2/2017); Other and unspecified hyperlipidemia (6/27/2012); Prediabetes; Protein S deficiency ([facility]) (11/2/2017); Rheumatoid arthritis involving multiple sites with positive rheumatoid factor ([facility]) (03/14/2016); Rheumatoid nodule ([facility]) (7/25/2017); Right bundle branch block (6/27/2012); and Urinary incontinence (11/2/2017).    ROS   Other than what is mentioned in HPI or physical exam, there is no history of headaches, double vision or blurred vision. No temporal tenderness or jaw claudication. No history of cataracts or glaucoma. No trouble swallowing difficulties or sore throats.  No chest complaints including chest pain, cough or sputum production. No GI complaints including nausea, vomiting, change in bowel habits, or past peptic ulcer disease. No history of blood in the stools. No urinary complaints including dysuria or frequency. No history of alopecia, photosensitivity, oral ulcerations, Raynaud's phenomena.       Objective:     /80   Pulse 74   Temp 36 °C (96.8 °F) (Temporal)   Resp 12    Wt 74.8 kg (164 lb 14.5 oz)   SpO2 95%  Body mass index is 29.21 kg/m².   Physical Exam:    Constitutional: Alert and oriented X3, patient is talkative with good eye contact.Skin: Warm, dry, good turgor, no rashes in visible areas.Eye: Equal, round and reactive, conjunctiva clear, lids normal EOM intactENMT: Lips without lesions, good dentition, no oropharyngeal ulcers, moist buccal mucosa, pinna without deformityNeck: Trachea midline, no masses, no thyromegaly.Lymph:  No cervical lymphadenopathy, no axillary lymphadenopathy, no supraclavicular lymphadenopathyRespiratory: Unlabored respiratory effort, lungs clear to auscultation, no wheezes, no ronchi.Cardiovascular: Normal S1, S2, no murmur, no edema.Abdomen: Soft, non-tender, no masses, no hepatosplenomegaly.Psych: Alert and oriented x3, normal affect and mood.Neuro: Cranial nerves 2-12 are grossly intact, no loss of sensation LEExt:no joint laxity noted in bilateral arms, no joint laxity noted in bilateral legs, patient does have extensive Heberden's nodes on most DIP joints of both hands, there is also some PIP joint swelling on the right fourth PIP joint, but there is no swan-neck or boutonniere deformities no sausage digits no dactylitis toes without crossover toes without splay toes there is bony hypertrophy of bilateral knees but no effusions    Lab Results   Component Value Date/Time    QNTTBGOLD Negative 06/29/2016 02:03 PM     Lab Results   Component Value Date/Time    HEPBCORIGM Negative 06/29/2016 02:03 PM    HEPBSAG Negative 06/29/2016 02:03 PM     Lab Results   Component Value Date/Time    SODIUM 137 12/14/2018 03:53 AM    POTASSIUM 4.2 12/14/2018 03:53 AM    CHLORIDE 106 12/14/2018 03:53 AM    CO2 27 12/14/2018 03:53 AM    GLUCOSE 148 (H) 12/14/2018 03:53 AM    BUN 11 12/14/2018 03:53 AM    CREATININE 0.63 12/14/2018 03:53 AM      Lab Results   Component Value Date/Time    WBC 12.1 (H) 12/14/2018 03:53 AM    RBC 4.04 (L) 12/14/2018 03:53 AM     HEMOGLOBIN 13.2 12/14/2018 03:53 AM    HEMATOCRIT 39.2 12/14/2018 03:53 AM    MCV 97.0 12/14/2018 03:53 AM    MCH 32.7 12/14/2018 03:53 AM    MCHC 33.7 12/14/2018 03:53 AM    MPV 11.8 12/14/2018 03:53 AM    NEUTSPOLYS 74.60 (H) 11/01/2018 11:01 AM    LYMPHOCYTES 16.10 (L) 11/01/2018 11:01 AM    MONOCYTES 8.10 11/01/2018 11:01 AM    EOSINOPHILS 0.60 11/01/2018 11:01 AM    BASOPHILS 0.40 11/01/2018 11:01 AM      Lab Results   Component Value Date/Time    CALCIUM 8.4 (L) 12/14/2018 03:53 AM    ASTSGOT 23 11/01/2018 11:01 AM    ALTSGPT 24 11/01/2018 11:01 AM    ALKPHOSPHAT 62 11/01/2018 11:01 AM    TBILIRUBIN 0.7 11/01/2018 11:01 AM    ALBUMIN 4.3 11/01/2018 11:01 AM    TOTPROTEIN 6.8 11/01/2018 11:01 AM     Lab Results   Component Value Date/Time    URICACID 4.5 06/29/2016 02:03 PM    RHEUMFACTN <10 06/29/2016 02:03 PM    CCPANTIBODY 4 06/29/2016 02:03 PM     Lab Results   Component Value Date/Time    SEDRATEWES 11 07/07/2018 11:25 AM     Lab Results   Component Value Date/Time    HBA1C 5.5 05/16/2016 07:50 AM     Lab Results   Component Value Date/Time    CPKTOTAL 61 06/29/2016 02:03 PM     Results for orders placed during the hospital encounter of 03/18/16   DX-JOINT SURVEY-HANDS SINGLE VIEW    Impression Multiple bilateral sites of osteoarthritis     Results for orders placed during the hospital encounter of 03/18/16   DX-JOINT SURVEY-FEET SINGLE VIEW    Impression 1.  Bilateral midfoot and forefoot osteoarthritis    2.  Bilateral bunion    3.  Prior fusion across the right 2nd proximal interphalangeal joint    4.  Foreign body or opaque material between 1st and 2nd phalanges     Results for orders placed during the hospital encounter of 03/23/18   DS-BONE DENSITY STUDY (DEXA)    Impression According to the World Health Organization classification, bone mineral density of this patient is osteopenia with increased risk of fracture. Percentage decrease in bone mineral density in the lumbar region is statistically  significant.        10-year Probability of Fracture:  Major Osteoporotic     19.3%  Hip     6.1%  Population      [place] ()    Based on left femur neck BMD          INTERPRETING LOCATION:  [address] [place] [address], [id number]     Results for orders placed during the hospital encounter of 01/14/09   DX-KNEE COMPLETE 4+    Impression IMPRESSION:     MILD PATELLOFEMORAL AND MEDIAL FEMOROTIBIAL COMPARTMENT DEGENERATIVE   OSTEOARTHROSIS.        GE[facility]     Read By ALEX WISE MD on Jan 14 2009 10:01AM  : [facility] Transcription Date: Jovi 15 2009  8:11AM  THIS DOCUMENT HAS BEEN ELECTRONICALLY SIGNED BY: ALEX WISE MD on   Jan 16 2009  1:32PM        Results for orders placed during the hospital encounter of 01/14/09   DX-SHOULDER 2+    Impression IMPRESSION:     NORMAL RADIOGRAPHS OF THE LEFT SHOULDER WITH NOTE MADE OF MINIMAL   DEGENERATIVE OSTEOARTHROSIS OF THE GLENOHUMERAL JOINT WITH MINIMAL   SPURRING.           Results for orders placed during the hospital encounter of 08/18/18   MR-LUMBAR SPINE-W/O    Impression Mild interval worsening L3/4 right paracentral protrusion results in mild worsening lateral recess stenosis    Otherwise stable multilevel degenerative change resulting in foraminal predominate stenoses, greatest is moderate to severe on the right at L3/4.    Lesser stenoses at other levels as detailed above    Stable L4/5 grade 1 anterolisthesis of secondary to severe facet arthropathy. Stable minimal degenerative retrolisthesis of the upper lumbar levels    Mature L5/S1 interbody fusion     Results for orders placed during the hospital encounter of 10/08/18   MR-KNEE-W/O LEFT    Impression Status post medial knee hemiarthroplasty without complication identified    Mild patellofemoral and lateral femorotibial osteoarthritis with some cartilage thinning and spurring but no full-thickness defects or areas of marrow edema are identified     Results for orders placed during the hospital  encounter of 01/14/09   DX-CERVICAL SPINE-2 OR 3 VIEWS    Impression IMPRESSION:     DEGENERATIVE DISC AND FACET ARTHROPATHY C5-6, C6-7, AND DEGENERATIVE   FACET ARTHROPATHY AT C7-T1.           Assessment and Plan:     1. Rheumatoid arthritis involving multiple sites with positive rheumatoid factor (HCC)  Working diagnosis patient is serologically negative and x-rays only indicate osteoarthritis, in light of all the cancers will stay off of all Biologics, discussed with patient about possibly treating with Plaquenil patient is interested and as patient is on anticoagulation we cannot do any NSAIDs we will do a small dose of Medrol temporarily until Plaquenil is established in the patient's system  - methylPREDNISolone (MEDROL) 4 MG Tab; 1 tab po qday  Dispense: 30 Tab; Refill: 0  - hydroxychloroquine (PLAQUENIL) 200 MG Tab; 1 tab po bid  Dispense: 60 Tab; Refill: 0  - lidocaine (LIDODERM) 5 % Patch; Apply 1 Patch to skin as directed every 24 hours.  Dispense: 90 Patch; Refill: 0    2. Long-term use of Plaquenil  Start Plaquenil 200 mg p.o. twice daily, will need to check G6PD levels  Next visit refer patient for ophthalmology evaluation baseline  While on Plaquenil patient will need a CBC every 6 months next CBC will be due about June 2019 will order at next visit  - methylPREDNISolone (MEDROL) 4 MG Tab; 1 tab po qday  Dispense: 30 Tab; Refill: 0  - hydroxychloroquine (PLAQUENIL) 200 MG Tab; 1 tab po bid  Dispense: 60 Tab; Refill: 0  - lidocaine (LIDODERM) 5 % Patch; Apply 1 Patch to skin as directed every 24 hours.  Dispense: 90 Patch; Refill: 0    3. Long term current use of systemic steroids  Start Medrol 4 mg p.o. daily hopefully this will be temporary until Plaquenil becomes more established and the patient system  Corticosteroid Therapy Informed Consent signed 1/17/2019-copy given to patient   - methylPREDNISolone (MEDROL) 4 MG Tab; 1 tab po qday  Dispense: 30 Tab; Refill: 0  - hydroxychloroquine (PLAQUENIL)  200 MG Tab; 1 tab po bid  Dispense: 60 Tab; Refill: 0  - lidocaine (LIDODERM) 5 % Patch; Apply 1 Patch to skin as directed every 24 hours.  Dispense: 90 Patch; Refill: 0    4. Adenocarcinoma of colon (HCC)  Status post resection December 2019 patient will need to be off of Biologics for 5 years  - methylPREDNISolone (MEDROL) 4 MG Tab; 1 tab po qday  Dispense: 30 Tab; Refill: 0  - hydroxychloroquine (PLAQUENIL) 200 MG Tab; 1 tab po bid  Dispense: 60 Tab; Refill: 0  - lidocaine (LIDODERM) 5 % Patch; Apply 1 Patch to skin as directed every 24 hours.  Dispense: 90 Patch; Refill: 0    5. Osteoporosis without current pathological fracture, unspecified osteoporosis type  Last DEXA March 2018 indicating osteoporosis next DEXA March 2020 patient not able to tolerate bisphosphonates, last visit we did give patient information on Prolia and possibly Reclast, next visit check status   continue calcium citrate 1200 mg by mouth daily and vitamin D about 2000 units by mouth daily and magnesium 200 mg by mouth daily  - methylPREDNISolone (MEDROL) 4 MG Tab; 1 tab po qday  Dispense: 30 Tab; Refill: 0  - hydroxychloroquine (PLAQUENIL) 200 MG Tab; 1 tab po bid  Dispense: 60 Tab; Refill: 0  - lidocaine (LIDODERM) 5 % Patch; Apply 1 Patch to skin as directed every 24 hours.  Dispense: 90 Patch; Refill: 0    6. Malignant melanoma, unspecified site (HCC)  Still the process of getting treated on the skin by dermatology  Biologics contraindicated  - methylPREDNISolone (MEDROL) 4 MG Tab; 1 tab po qday  Dispense: 30 Tab; Refill: 0  - hydroxychloroquine (PLAQUENIL) 200 MG Tab; 1 tab po bid  Dispense: 60 Tab; Refill: 0  - lidocaine (LIDODERM) 5 % Patch; Apply 1 Patch to skin as directed every 24 hours.  Dispense: 90 Patch; Refill: 0    7. Chronic anticoagulation  This will impact with kind of medications we can use for this patient's arthritis, NSAIDs are contraindicated because of increased risk of bleeding while on chronic  anticoagulation    8. Essential hypertension, benign  May impact the type of medications we can use for this patient's arthritis. We will have to keep this under advisement.      Followup: Return in about 4 weeks (around 5/30/2019). or sooner jose eduardo Mathur  was seen 30 minutes face-to-face of which more than 50% of the time was spent counseling the patient (excluding time for procedures)  regarding  rheumatological condition and care. Therapy was discussed in detail.      Please note that this dictation was created using voice recognition software. I have made every reasonable attempt to correct obvious errors, but I expect that there are errors of grammar and possibly content that I did not discover before finalizing the note.

## 2019-05-03 LAB
E COLI SXT1+2 STL IA: NORMAL
MEV IGG SER IA-ACNC: 4.67
MUV IGG SER IA-ACNC: 2.35
RUBV AB SER QL: >500 IU/ML
SIGNIFICANT IND 70042: NORMAL
SITE SITE: NORMAL
SOURCE SOURCE: NORMAL

## 2019-05-05 LAB
BACTERIA STL CULT: NORMAL
E COLI SXT1+2 STL IA: NORMAL
SIGNIFICANT IND 70042: NORMAL
SITE SITE: NORMAL
SOURCE SOURCE: NORMAL

## 2019-05-06 ENCOUNTER — APPOINTMENT (RX ONLY)
Dept: URBAN - METROPOLITAN AREA CLINIC 36 | Facility: CLINIC | Age: 84
Setting detail: DERMATOLOGY
End: 2019-05-06

## 2019-05-06 PROBLEM — C44.321 SQUAMOUS CELL CARCINOMA OF SKIN OF NOSE: Status: ACTIVE | Noted: 2019-05-06

## 2019-05-06 PROCEDURE — 17281 DSTR MAL LS F/E/E/N/L/M .6-1: CPT | Mod: 79

## 2019-05-06 PROCEDURE — ? CURETTAGE AND DESTRUCTION WITH PATHOLOGY

## 2019-05-06 NOTE — PROCEDURE: CURETTAGE AND DESTRUCTION WITH PATHOLOGY
Anesthesia Volume In Cc: 4
Lab: [id number]
Detail Level: Detailed
Anesthesia Type: 1% lidocaine with epinephrine and a 1:10 solution of 8.4% sodium bicarbonate
Biopsy Type: H and E
Number Of Curettages: 3
Size Of Lesion In Cm: 0.5
Bill As A Line Item Or As Units: Line Item
Consent was obtained from the patient. The risks, benefits and alternatives to therapy were discussed in detail. Specifically, the risks of infection, scarring, bleeding, prolonged wound healing, nerve injury, incomplete removal, allergy to anesthesia and recurrence were addressed. Alternatives to ED&C, such as: surgical removal and XRT were also discussed.  Prior to the procedure, the treatment site was clearly identified and confirmed by the patient. All components of Universal Protocol/PAUSE Rule completed.
Lab Facility: 
Cautery Type: electrodesiccation
Additional Information: (Optional): The wound was cleaned, and a pressure dressing was applied.  The patient received detailed post-op instructions.
Post-Care Instructions: I reviewed with the patient in detail post-care instructions. Patient is to keep the area dry for 48 hours, and not to engage in any swimming until the area is healed. Should the patient develop any fevers, chills, bleeding, severe pain patient will contact the office immediately.
Bill 79014 For Specimen Handling/Conveyance To Laboratory?: no
Size Of Lesion After Curettage: 0.9
What Was Performed First?: Curettage
Histology Text: Following the procedure a portion of the curetted material was sent for histologic evaluation.
Billing Type: Third-Party Bill

## 2019-05-15 ENCOUNTER — APPOINTMENT (RX ONLY)
Dept: URBAN - METROPOLITAN AREA CLINIC 4 | Facility: CLINIC | Age: 84
Setting detail: DERMATOLOGY
End: 2019-05-15

## 2019-05-15 DIAGNOSIS — D22 MELANOCYTIC NEVI: ICD-10-CM

## 2019-05-15 PROBLEM — D22.72 MELANOCYTIC NEVI OF LEFT LOWER LIMB, INCLUDING HIP: Status: ACTIVE | Noted: 2019-05-15

## 2019-05-15 PROBLEM — D48.5 NEOPLASM OF UNCERTAIN BEHAVIOR OF SKIN: Status: ACTIVE | Noted: 2019-05-15

## 2019-05-15 PROCEDURE — ? BIOPSY BY SHAVE METHOD

## 2019-05-15 PROCEDURE — 99213 OFFICE O/P EST LOW 20 MIN: CPT | Mod: 25,24

## 2019-05-15 PROCEDURE — 11102 TANGNTL BX SKIN SINGLE LES: CPT | Mod: 79

## 2019-05-15 PROCEDURE — ? OBSERVATION

## 2019-05-15 PROCEDURE — 11103 TANGNTL BX SKIN EA SEP/ADDL: CPT | Mod: 79

## 2019-05-15 ASSESSMENT — LOCATION DETAILED DESCRIPTION DERM: LOCATION DETAILED: LEFT PROXIMAL PRETIBIAL REGION

## 2019-05-15 ASSESSMENT — LOCATION ZONE DERM: LOCATION ZONE: LEG

## 2019-05-15 ASSESSMENT — LOCATION SIMPLE DESCRIPTION DERM: LOCATION SIMPLE: LEFT PRETIBIAL REGION

## 2019-05-15 NOTE — PROCEDURE: BIOPSY BY SHAVE METHOD
Biopsy Method: Personna blade
Detail Level: Detailed
Bill For Surgical Tray: no
Notification Instructions: Patient will be notified of biopsy results. However, patient instructed to call the office if not contacted within 2 weeks.
Consent: Written consent was obtained and risks were reviewed including but not limited to scarring, infection, bleeding, scabbing, incomplete removal, nerve damage and allergy to anesthesia.
Lab: [id number]
Curettage Text: The wound bed was treated with curettage after the biopsy was performed.
Additional Anesthesia Volume In Cc (Will Not Render If 0): 0
Cryotherapy Text: The wound bed was treated with cryotherapy after the biopsy was performed.
Anesthesia Type: 1% lidocaine with epinephrine
Depth Of Biopsy: dermis
Wound Care: Petrolatum
Lab Facility: 
Electrodesiccation Text: The wound bed was treated with electrodesiccation after the biopsy was performed.
Anesthesia Volume In Cc: 0.5
Type Of Destruction Used: Curettage
Biopsy Type: H and E
Billing Type: Third-Party Bill
Post-Care Instructions: I reviewed with the patient in detail post-care instructions. Patient is to keep the biopsy site dry overnight, and then apply bacitracin twice daily until healed. Patient may apply hydrogen peroxide soaks to remove any crusting.
Dressing: bandage
Electrodesiccation And Curettage Text: The wound bed was treated with electrodesiccation and curettage after the biopsy was performed.
Hemostasis: Drysol
Silver Nitrate Text: The wound bed was treated with silver nitrate after the biopsy was performed.
Was A Bandage Applied: Yes

## 2019-05-16 ENCOUNTER — TELEPHONE (OUTPATIENT)
Dept: VASCULAR LAB | Facility: MEDICAL CENTER | Age: 84
End: 2019-05-16

## 2019-05-16 DIAGNOSIS — I48.20 CHRONIC ATRIAL FIBRILLATION (HCC): ICD-10-CM

## 2019-05-16 NOTE — TELEPHONE ENCOUNTER
Spoke with the pt about the DOAC labs are due. Informed that DOAC's are high risk medication and that being said they require follow up appt in the clinic as well as lab work every 6 months to make sure the pt is safe. She will go to [facility] on 6/3 . CELIO Murguia.

## 2019-05-20 ENCOUNTER — HOSPITAL ENCOUNTER (OUTPATIENT)
Dept: LAB | Facility: MEDICAL CENTER | Age: 84
End: 2019-05-20
Attending: NURSE PRACTITIONER
Payer: MEDICARE

## 2019-05-20 ENCOUNTER — HOSPITAL ENCOUNTER (OUTPATIENT)
Dept: LAB | Facility: MEDICAL CENTER | Age: 84
End: 2019-05-20
Attending: INTERNAL MEDICINE
Payer: MEDICARE

## 2019-05-20 DIAGNOSIS — I48.20 CHRONIC ATRIAL FIBRILLATION (HCC): ICD-10-CM

## 2019-05-20 DIAGNOSIS — Z79.899 LONG-TERM USE OF PLAQUENIL: ICD-10-CM

## 2019-05-20 DIAGNOSIS — M05.79 RHEUMATOID ARTHRITIS INVOLVING MULTIPLE SITES WITH POSITIVE RHEUMATOID FACTOR (HCC): ICD-10-CM

## 2019-05-20 LAB
BASOPHILS # BLD AUTO: 0.9 % (ref 0–1.8)
BASOPHILS # BLD: 0.05 K/UL (ref 0–0.12)
EOSINOPHIL # BLD AUTO: 0.17 K/UL (ref 0–0.51)
EOSINOPHIL NFR BLD: 3 % (ref 0–6.9)
ERYTHROCYTE [DISTWIDTH] IN BLOOD BY AUTOMATED COUNT: 47.8 FL (ref 35.9–50)
HCT VFR BLD AUTO: 47.8 % (ref 37–47)
HGB BLD-MCNC: 15 G/DL (ref 12–16)
IMM GRANULOCYTES # BLD AUTO: 0.01 K/UL (ref 0–0.11)
IMM GRANULOCYTES NFR BLD AUTO: 0.2 % (ref 0–0.9)
LYMPHOCYTES # BLD AUTO: 1.36 K/UL (ref 1–4.8)
LYMPHOCYTES NFR BLD: 24.1 % (ref 22–41)
MCH RBC QN AUTO: 29.5 PG (ref 27–33)
MCHC RBC AUTO-ENTMCNC: 31.4 G/DL (ref 33.6–35)
MCV RBC AUTO: 93.9 FL (ref 81.4–97.8)
MONOCYTES # BLD AUTO: 0.43 K/UL (ref 0–0.85)
MONOCYTES NFR BLD AUTO: 7.6 % (ref 0–13.4)
NEUTROPHILS # BLD AUTO: 3.62 K/UL (ref 2–7.15)
NEUTROPHILS NFR BLD: 64.2 % (ref 44–72)
NRBC # BLD AUTO: 0 K/UL
NRBC BLD-RTO: 0 /100 WBC
PLATELET # BLD AUTO: 203 K/UL (ref 164–446)
PMV BLD AUTO: 11.8 FL (ref 9–12.9)
RBC # BLD AUTO: 5.09 M/UL (ref 4.2–5.4)
WBC # BLD AUTO: 5.6 K/UL (ref 4.8–10.8)

## 2019-05-20 PROCEDURE — 36415 COLL VENOUS BLD VENIPUNCTURE: CPT

## 2019-05-20 PROCEDURE — 85025 COMPLETE CBC W/AUTO DIFF WBC: CPT

## 2019-05-20 PROCEDURE — 82955 ASSAY OF G6PD ENZYME: CPT

## 2019-05-20 PROCEDURE — 80048 BASIC METABOLIC PNL TOTAL CA: CPT

## 2019-05-21 LAB
ANION GAP SERPL CALC-SCNC: 11 MMOL/L (ref 0–11.9)
BUN SERPL-MCNC: 17 MG/DL (ref 8–22)
CALCIUM SERPL-MCNC: 8.8 MG/DL (ref 8.5–10.5)
CHLORIDE SERPL-SCNC: 107 MMOL/L (ref 96–112)
CO2 SERPL-SCNC: 27 MMOL/L (ref 20–33)
CREAT SERPL-MCNC: 0.74 MG/DL (ref 0.5–1.4)
GLUCOSE SERPL-MCNC: 88 MG/DL (ref 65–99)
POTASSIUM SERPL-SCNC: 4.1 MMOL/L (ref 3.6–5.5)
SODIUM SERPL-SCNC: 145 MMOL/L (ref 135–145)

## 2019-05-22 LAB — G6PD RBC-CCNC: 13.6 U/G HB (ref 9.9–16.6)

## 2019-06-03 ENCOUNTER — ANTICOAGULATION VISIT (OUTPATIENT)
Dept: MEDICAL GROUP | Facility: PHYSICIAN GROUP | Age: 84
End: 2019-06-03
Payer: MEDICARE

## 2019-06-03 DIAGNOSIS — I48.91 ATRIAL FIBRILLATION, UNSPECIFIED TYPE (HCC): ICD-10-CM

## 2019-06-03 DIAGNOSIS — I48.20 CHRONIC ATRIAL FIBRILLATION (HCC): ICD-10-CM

## 2019-06-03 LAB — INR PPP: 1.2 (ref 2–3.5)

## 2019-06-03 PROCEDURE — 85610 PROTHROMBIN TIME: CPT | Performed by: INTERNAL MEDICINE

## 2019-06-03 PROCEDURE — 99211 OFF/OP EST MAY X REQ PHY/QHP: CPT | Performed by: INTERNAL MEDICINE

## 2019-06-03 NOTE — PROGRESS NOTES
Anticoagulation Summary  As of 6/3/2019    INR goal:      TTR:   51.9 % (1.6 y)   Prior goal:   2.0-3.0   INR used for dosin.20! (6/3/2019)   Warfarin maintenance plan:   No maintenance plan   Plan last modified:   Jasmyne Macdonald, PharmD (2018)   Next INR check:   2019   Target end date:   Indefinite    Indications    Deep vein thrombosis (HCC) [I82.409]  Atrial fibrillation [I48.91] [I48.91]             Anticoagulation Episode Summary     IN[facility] check location[facility]   [facility]    Preferred lab:       Send INR reminders to:       Comments:   Xarelto      Anticoagulation Care Providers     Provider Role Specialty Phone number    Eric Cook M.D. Referring Internal Medicine [phone]    Jasmyne Macdonald, PharmD Responsible          Anticoagulation Patient Findings                Target end date:Indefinite     Indication: DVT, Atrial fibrillation     Drug: Xarelto 20mg     CHADsVASC = at least 4    Health Status Since Last Assessment   Patient denies any new relevant medical problems, ED visits or hospitalizations   Patient denies any embolic events (stroke/tia/systemic embolism)    Adherence with DOAC Therapy   Pt has NO missed any doses in the average week    Bleeding Risk Assessment     Negative Epistaxis   Pt denies any excessive or unusual bleeding/hematomas.  Pt denies any GI bleeds or hematemesis.  Pt denies any concerning daily headache or sub dural hematoma symptoms.     Pt denies any hematuria or abnormal vaginal bleeding.   Latest Hemoglobin 15.0   ETOH overuse Negative     Creatinine Clearance/Renal Function     Latest ClCr 69.2 mL/min     Drug Interactions   Platelets: 203   ASA/other antiplatelets Negative   NSAID Negative   Other drug interactions Negative   X Verified no anticonvulsant or azole therapy, education provided for future use.     Examination   Blood Pressure 164/86 HR 79   Symptomatic hypotension Negative   Significant gait impairment/imbalance/high risk for falls?  Negative    Final Assessment and Recommendations:   Patient appears stable from the anticoagulation staindpoint.     Benefits of continued DOAC therapy outweigh risks for this patient   Recommend pt continue with current DOAC therapy Xarelto 20 mg daily (with dose adjustment)     Other Actions: cmp/ cbc hemogram ordered prior to next visit    Follow up:   Will follow up with patient 6 months.      Steve Hahn, PerlitaD

## 2019-06-06 ENCOUNTER — OFFICE VISIT (OUTPATIENT)
Dept: RHEUMATOLOGY | Facility: MEDICAL CENTER | Age: 84
End: 2019-06-06
Payer: MEDICARE

## 2019-06-06 ENCOUNTER — APPOINTMENT (RX ONLY)
Dept: URBAN - METROPOLITAN AREA CLINIC 4 | Facility: CLINIC | Age: 84
Setting detail: DERMATOLOGY
End: 2019-06-06

## 2019-06-06 VITALS
OXYGEN SATURATION: 95 % | WEIGHT: 166.01 LBS | SYSTOLIC BLOOD PRESSURE: 128 MMHG | RESPIRATION RATE: 14 BRPM | HEART RATE: 74 BPM | BODY MASS INDEX: 29.41 KG/M2 | TEMPERATURE: 97.4 F | DIASTOLIC BLOOD PRESSURE: 70 MMHG

## 2019-06-06 DIAGNOSIS — C18.9 ADENOCARCINOMA OF COLON (HCC): ICD-10-CM

## 2019-06-06 DIAGNOSIS — Z79.899 LONG-TERM USE OF PLAQUENIL: ICD-10-CM

## 2019-06-06 DIAGNOSIS — C43.4 MALIGNANT MELANOMA OF NECK (HCC): ICD-10-CM

## 2019-06-06 DIAGNOSIS — R73.01 IMPAIRED FASTING GLUCOSE: ICD-10-CM

## 2019-06-06 DIAGNOSIS — M05.79 RHEUMATOID ARTHRITIS INVOLVING MULTIPLE SITES WITH POSITIVE RHEUMATOID FACTOR (HCC): ICD-10-CM

## 2019-06-06 DIAGNOSIS — I48.91 ATRIAL FIBRILLATION, UNSPECIFIED TYPE (HCC): ICD-10-CM

## 2019-06-06 DIAGNOSIS — M81.0 OSTEOPOROSIS, UNSPECIFIED OSTEOPOROSIS TYPE, UNSPECIFIED PATHOLOGICAL FRACTURE PRESENCE: ICD-10-CM

## 2019-06-06 DIAGNOSIS — I10 ESSENTIAL HYPERTENSION, BENIGN: ICD-10-CM

## 2019-06-06 DIAGNOSIS — Z79.01 CHRONIC ANTICOAGULATION: ICD-10-CM

## 2019-06-06 DIAGNOSIS — M25.552 HIP PAIN, LEFT: ICD-10-CM

## 2019-06-06 PROBLEM — D03.62 MELANOMA IN SITU OF LEFT UPPER LIMB, INCLUDING SHOULDER: Status: ACTIVE | Noted: 2019-06-06

## 2019-06-06 PROCEDURE — 99215 OFFICE O/P EST HI 40 MIN: CPT | Performed by: INTERNAL MEDICINE

## 2019-06-06 PROCEDURE — 12032 INTMD RPR S/A/T/EXT 2.6-7.5: CPT | Mod: 79

## 2019-06-06 PROCEDURE — 11603 EXC TR-EXT MAL+MARG 2.1-3 CM: CPT | Mod: 79

## 2019-06-06 PROCEDURE — ? EXCISION

## 2019-06-06 RX ORDER — HYDROXYCHLOROQUINE SULFATE 200 MG/1
TABLET, FILM COATED ORAL
Qty: 180 TAB | Refills: 0 | Status: SHIPPED | OUTPATIENT
Start: 2019-06-06 | End: 2019-07-30 | Stop reason: SDUPTHER

## 2019-06-06 NOTE — PROGRESS NOTES
Marry Mathur is a 83 y.o. female here for a provider visit for Prolia 60mg sq injection.    Reason for injection: osteoporosis  Order in MAR?: Yes  Patient supplied?:No  Minimum interval has been met for this injection (per MAR order): Yes    Order and dose verified by: dr garcia  Patient tolerated injection and no adverse effects were observed or reported: Yes    # of Administrations remaining in MAR: 5

## 2019-06-06 NOTE — PROCEDURE: EXCISION
Graft Donor Site Bandage (Optional-Leave Blank If You Don't Want In Note): Steri-strips and a pressure bandage were applied to the donor site.
Star Wedge Flap Text: The defect edges were debeveled with a #15 scalpel blade.  Given the location of the defect, shape of the defect and the proximity to free margins a star wedge flap was deemed most appropriate.  Using a sterile surgical marker, an appropriate rotation flap was drawn incorporating the defect and placing the expected incisions within the relaxed skin tension lines where possible. The area thus outlined was incised deep to adipose tissue with a #15 scalpel blade.  The skin margins were undermined to an appropriate distance in all directions utilizing iris scissors.
Render Path Notes In Note?: no
Repair Performed By Another Provider Text (Leave Blank If You Do Not Want): After the tissue was excised the defect was repaired by another provider.
Complex Repair And A-T Advancement Flap Text: The defect edges were debeveled with a #15 scalpel blade.  The primary defect was closed partially with a complex linear closure.  Given the location of the remaining defect, shape of the defect and the proximity to free margins an A-T advancement flap was deemed most appropriate for complete closure of the defect.  Using a sterile surgical marker, an appropriate advancement flap was drawn incorporating the defect and placing the expected incisions within the relaxed skin tension lines where possible.    The area thus outlined was incised deep to adipose tissue with a #15 scalpel blade.  The skin margins were undermined to an appropriate distance in all directions utilizing iris scissors.
Pre-Excision Curettage Text (Leave Blank If You Do Not Want): Prior to drawing the surgical margin the visible lesion was removed with electrodesiccation and curettage to clearly define the lesion size.
Show Referring Physician Variable: Yes
Dorsal Nasal Flap Text: The defect edges were debeveled with a #15 scalpel blade.  Given the location of the defect and the proximity to free margins a dorsal nasal flap was deemed most appropriate.  Using a sterile surgical marker, an appropriate dorsal nasal flap was drawn around the defect.    The area thus outlined was incised deep to adipose tissue with a #15 scalpel blade.  The skin margins were undermined to an appropriate distance in all directions utilizing iris scissors.
Complex Repair And Xenograft Text: The defect edges were debeveled with a #15 scalpel blade.  The primary defect was closed partially with a complex linear closure.  Given the location of the defect, shape of the defect and the proximity to free margins a xenograft was deemed most appropriate to repair the remaining defect.  The graft was trimmed to fit the size of the remaining defect.  The graft was then placed in the primary defect, oriented appropriately, and sutured into place.
Posterior Auricular Interpolation Flap Text: A decision was made to reconstruct the defect utilizing an interpolation axial flap and a staged reconstruction.  A telfa template was made of the defect.  This telfa template was then used to outline the posterior auricular interpolation flap.  The donor area for the pedicle flap was then injected with anesthesia.  The flap was excised through the skin and subcutaneous tissue down to the layer of the underlying musculature.  The pedicle flap was carefully excised within this deep plane to maintain its blood supply.  The edges of the donor site were undermined.   The donor site was closed in a primary fashion.  The pedicle was then rotated into position and sutured.  Once the tube was sutured into place, adequate blood supply was confirmed with blanching and refill.  The pedicle was then wrapped with xeroform gauze and dressed appropriately with a telfa and gauze bandage to ensure continued blood supply and protect the attached pedicle.
Lab Facility: [id number]
Spiral Flap Text: The defect edges were debeveled with a #15 scalpel blade.  Given the location of the defect, shape of the defect and the proximity to free margins a spiral flap was deemed most appropriate.  Using a sterile surgical marker, an appropriate rotation flap was drawn incorporating the defect and placing the expected incisions within the relaxed skin tension lines where possible. The area thus outlined was incised deep to adipose tissue with a #15 scalpel blade.  The skin margins were undermined to an appropriate distance in all directions utilizing iris scissors.
Complex Repair And O-T Advancement Flap Text: The defect edges were debeveled with a #15 scalpel blade.  The primary defect was closed partially with a complex linear closure.  Given the location of the remaining defect, shape of the defect and the proximity to free margins an O-T advancement flap was deemed most appropriate for complete closure of the defect.  Using a sterile surgical marker, an appropriate advancement flap was drawn incorporating the defect and placing the expected incisions within the relaxed skin tension lines where possible.    The area thus outlined was incised deep to adipose tissue with a #15 scalpel blade.  The skin margins were undermined to an appropriate distance in all directions utilizing iris scissors.
Trilobed Flap Text: The defect edges were debeveled with a #15 scalpel blade.  Given the location of the defect and the proximity to free margins a trilobed flap was deemed most appropriate.  Using a sterile surgical marker, an appropriate trilobed flap drawn around the defect.    The area thus outlined was incised deep to adipose tissue with a #15 scalpel blade.  The skin margins were undermined to an appropriate distance in all directions utilizing iris scissors.
Positioning (Leave Blank If You Do Not Want): The patient was placed in a comfortable position exposing the surgical site.
Complex Repair And Skin Substitute Graft Text: The defect edges were debeveled with a #15 scalpel blade.  The primary defect was closed partially with a complex linear closure.  Given the location of the remaining defect, shape of the defect and the proximity to free margins a skin substitute graft was deemed most appropriate to repair the remaining defect.  The graft was trimmed to fit the size of the remaining defect.  The graft was then placed in the primary defect, oriented appropriately, and sutured into place.
Paramedian Forehead Flap Text: A decision was made to reconstruct the defect utilizing an interpolation axial flap and a staged reconstruction.  A telfa template was made of the defect.  This telfa template was then used to outline the paramedian forehead pedicle flap.  The donor area for the pedicle flap was then injected with anesthesia.  The flap was excised through the skin and subcutaneous tissue down to the layer of the underlying musculature.  The pedicle flap was carefully excised within this deep plane to maintain its blood supply.  The edges of the donor site were undermined.   The donor site was closed in a primary fashion.  The pedicle was then rotated into position and sutured.  Once the tube was sutured into place, adequate blood supply was confirmed with blanching and refill.  The pedicle was then wrapped with xeroform gauze and dressed appropriately with a telfa and gauze bandage to ensure continued blood supply and protect the attached pedicle.
Intermediate / Complex Repair - Final Wound Length In Cm: 3.5
Lab: [id number]
Rotation Flap Text: The defect edges were debeveled with a #15 scalpel blade.  Given the location of the defect, shape of the defect and the proximity to free margins a rotation flap was deemed most appropriate.  Using a sterile surgical marker, an appropriate rotation flap was drawn incorporating the defect and placing the expected incisions within the relaxed skin tension lines where possible.    The area thus outlined was incised deep to adipose tissue with a #15 scalpel blade.  The skin margins were undermined to an appropriate distance in all directions utilizing iris scissors.
Number Of Deep Sutures (Optional): 4
Path Notes (To The Dermatopathologist): Please check margins.
Perilesional Excision Additional Text (Leave Blank If You Do Not Want): The margin was drawn around the clinically apparent lesion. Incisions were then made along these lines to the appropriate tissue plane and the lesion was extirpated.
Estimated Blood Loss (Cc): minimal
Complex Repair And O-L Flap Text: The defect edges were debeveled with a #15 scalpel blade.  The primary defect was closed partially with a complex linear closure.  Given the location of the remaining defect, shape of the defect and the proximity to free margins an O-L flap was deemed most appropriate for complete closure of the defect.  Using a sterile surgical marker, an appropriate flap was drawn incorporating the defect and placing the expected incisions within the relaxed skin tension lines where possible.    The area thus outlined was incised deep to adipose tissue with a #15 scalpel blade.  The skin margins were undermined to an appropriate distance in all directions utilizing iris scissors.
Bilobed Transposition Flap Text: The defect edges were debeveled with a #15 scalpel blade.  Given the location of the defect and the proximity to free margins a bilobed transposition flap was deemed most appropriate.  Using a sterile surgical marker, an appropriate bilobe flap drawn around the defect.    The area thus outlined was incised deep to adipose tissue with a #15 scalpel blade.  The skin margins were undermined to an appropriate distance in all directions utilizing iris scissors.
Lip Wedge Excision Repair Text: Given the location of the defect and the proximity to free margins a full thickness wedge repair was deemed most appropriate.  Using a sterile surgical marker, the appropriate repair was drawn incorporating the defect and placing the expected incisions perpendicular to the vermilion border.  The vermilion border was also meticulously outlined to ensure appropriate reapproximation during the repair.  The area thus outlined was incised through and through with a #15 scalpel blade.  The muscularis and dermis were reaproximated with deep sutures following hemostasis. Care was taken to realign the vermilion border before proceeding with the superficial closure.  Once the vermilion was realigned the superfical and mucosal closure was finished.
Hatchet Flap Text: The defect edges were debeveled with a #15 scalpel blade.  Given the location of the defect, shape of the defect and the proximity to free margins a hatchet flap was deemed most appropriate.  Using a sterile surgical marker, an appropriate hatchet flap was drawn incorporating the defect and placing the expected incisions within the relaxed skin tension lines where possible.    The area thus outlined was incised deep to adipose tissue with a #15 scalpel blade.  The skin margins were undermined to an appropriate distance in all directions utilizing iris scissors.
Excisional Biopsy Additional Text (Leave Blank If You Do Not Want): The margin was drawn around the clinically apparent lesion. An elliptical shape was then drawn on the skin incorporating the lesion and margins.  Incisions were then made along these lines to the appropriate tissue plane and the lesion was extirpated.
Complex Repair And Bilobe Flap Text: The defect edges were debeveled with a #15 scalpel blade.  The primary defect was closed partially with a complex linear closure.  Given the location of the remaining defect, shape of the defect and the proximity to free margins a bilobe flap was deemed most appropriate for complete closure of the defect.  Using a sterile surgical marker, an appropriate advancement flap was drawn incorporating the defect and placing the expected incisions within the relaxed skin tension lines where possible.    The area thus outlined was incised deep to adipose tissue with a #15 scalpel blade.  The skin margins were undermined to an appropriate distance in all directions utilizing iris scissors.
Bilobed Flap Text: The defect edges were debeveled with a #15 scalpel blade.  Given the location of the defect and the proximity to free margins a bilobe flap was deemed most appropriate.  Using a sterile surgical marker, an appropriate bilobe flap drawn around the defect.    The area thus outlined was incised deep to adipose tissue with a #15 scalpel blade.  The skin margins were undermined to an appropriate distance in all directions utilizing iris scissors.
Ftsg Text: The defect edges were debeveled with a #15 scalpel blade.  Given the location of the defect, shape of the defect and the proximity to free margins a full thickness skin graft was deemed most appropriate.  Using a sterile surgical marker, the primary defect shape was transferred to the donor site. The area thus outlined was incised deep to adipose tissue with a #15 scalpel blade.  The harvested graft was then trimmed of adipose tissue until only dermis and epidermis was left.  The skin margins of the secondary defect were undermined to an appropriate distance in all directions utilizing iris scissors.  The secondary defect was closed with interrupted buried subcutaneous sutures.  The skin edges were then re-apposed with running  sutures.  The skin graft was then placed in the primary defect and oriented appropriately.
Slit Excision Additional Text (Leave Blank If You Do Not Want): A linear line was drawn on the skin overlying the lesion. An incision was made slowly until the lesion was visualized.  Once visualized, the lesion was removed with blunt dissection.
Complex Repair And Melolabial Flap Text: The defect edges were debeveled with a #15 scalpel blade.  The primary defect was closed partially with a complex linear closure.  Given the location of the remaining defect, shape of the defect and the proximity to free margins a melolabial flap was deemed most appropriate for complete closure of the defect.  Using a sterile surgical marker, an appropriate advancement flap was drawn incorporating the defect and placing the expected incisions within the relaxed skin tension lines where possible.    The area thus outlined was incised deep to adipose tissue with a #15 scalpel blade.  The skin margins were undermined to an appropriate distance in all directions utilizing iris scissors.
Hemostasis: Electrocautery
Banner Transposition Flap Text: The defect edges were debeveled with a #15 scalpel blade.  Given the location of the defect and the proximity to free margins a Banner transposition flap was deemed most appropriate.  Using a sterile surgical marker, an appropriate flap drawn around the defect. The area thus outlined was incised deep to adipose tissue with a #15 scalpel blade.  The skin margins were undermined to an appropriate distance in all directions utilizing iris scissors.
Mucosal Advancement Flap Text: Given the location of the defect, shape of the defect and the proximity to free margins a mucosal advancement flap was deemed most appropriate. Incisions were made with a 15 blade scalpel in the appropriate fashion along the cutaneous vermilion border and the mucosal lip. The remaining actinically damaged mucosal tissue was excised.  The mucosal advancement flap was then elevated to the gingival sulcus with care taken to preserve the neurovascular structures and advanced into the primary defect. Care was taken to ensure that precise realignment of the vermilion border was achieved.
Split-Thickness Skin Graft Text: The defect edges were debeveled with a #15 scalpel blade.  Given the location of the defect, shape of the defect and the proximity to free margins a split thickness skin graft was deemed most appropriate.  Using a sterile surgical marker, the primary defect shape was transferred to the donor site. The split thickness graft was then harvested.  The skin graft was then placed in the primary defect and oriented appropriately.
Consent was obtained from the patient. The risks and benefits to therapy were discussed in detail. Specifically, the risks of infection, scarring, bleeding, prolonged wound healing, incomplete removal, allergy to anesthesia, nerve injury and recurrence were addressed. Prior to the procedure, the treatment site was clearly identified and confirmed by the patient.
Saucerization Excision Additional Text (Leave Blank If You Do Not Want): The margin was drawn around the clinically apparent lesion.  Incisions were then made along these lines, in a tangential fashion, to the appropriate tissue plane and the lesion was extirpated.
Complex Repair And Rotation Flap Text: The defect edges were debeveled with a #15 scalpel blade.  The primary defect was closed partially with a complex linear closure.  Given the location of the remaining defect, shape of the defect and the proximity to free margins a rotation flap was deemed most appropriate for complete closure of the defect.  Using a sterile surgical marker, an appropriate advancement flap was drawn incorporating the defect and placing the expected incisions within the relaxed skin tension lines where possible.    The area thus outlined was incised deep to adipose tissue with a #15 scalpel blade.  The skin margins were undermined to an appropriate distance in all directions utilizing iris scissors.
Ear Star Wedge Flap Text: The defect edges were debeveled with a #15 blade scalpel.  Given the location of the defect and the proximity to free margins (helical rim) an ear star wedge flap was deemed most appropriate.  Using a sterile surgical marker, the appropriate flap was drawn incorporating the defect and placing the expected incisions between the helical rim and antihelix where possible.  The area thus outlined was incised through and through with a #15 scalpel blade.
Modified Advancement Flap Text: The defect edges were debeveled with a #15 scalpel blade.  Given the location of the defect, shape of the defect and the proximity to free margins a modified advancement flap was deemed most appropriate.  Using a sterile surgical marker, an appropriate advancement flap was drawn incorporating the defect and placing the expected incisions within the relaxed skin tension lines where possible.    The area thus outlined was incised deep to adipose tissue with a #15 scalpel blade.  The skin margins were undermined to an appropriate distance in all directions utilizing iris scissors.
Cartilage Graft Text: The defect edges were debeveled with a #15 scalpel blade.  Given the location of the defect, shape of the defect, the fact the defect involved a full thickness cartilage defect a cartilage graft was deemed most appropriate.  An appropriate donor site was identified, cleansed, and anesthetized. The cartilage graft was then harvested and transferred to the recipient site, oriented appropriately and then sutured into place.  The secondary defect was then repaired using a primary closure.
Billing Type: Third-Party Bill
Deep Sutures: 3-0 Polysorb
Elliptical Excision Additional Text (Leave Blank If You Do Not Want): The margin was drawn around the clinically apparent lesion.  An elliptical shape was then drawn on the skin incorporating the lesion and margins.  Incisions were then made along these lines to the appropriate tissue plane and the lesion was extirpated.
Complex Repair And Rhombic Flap Text: The defect edges were debeveled with a #15 scalpel blade.  The primary defect was closed partially with a complex linear closure.  Given the location of the remaining defect, shape of the defect and the proximity to free margins a rhombic flap was deemed most appropriate for complete closure of the defect.  Using a sterile surgical marker, an appropriate advancement flap was drawn incorporating the defect and placing the expected incisions within the relaxed skin tension lines where possible.    The area thus outlined was incised deep to adipose tissue with a #15 scalpel blade.  The skin margins were undermined to an appropriate distance in all directions utilizing iris scissors.
Dressing: pressure dressing
Bilateral Helical Rim Advancement Flap Text: The defect edges were debeveled with a #15 blade scalpel.  Given the location of the defect and the proximity to free margins (helical rim) a bilateral helical rim advancement flap was deemed most appropriate.  Using a sterile surgical marker, the appropriate advancement flaps were drawn incorporating the defect and placing the expected incisions between the helical rim and antihelix where possible.  The area thus outlined was incised through and through with a #15 scalpel blade.  With a skin hook and iris scissors, the flaps were gently and sharply undermined and freed up.
Mercedes Flap Text: The defect edges were debeveled with a #15 scalpel blade.  Given the location of the defect, shape of the defect and the proximity to free margins a Mercedes flap was deemed most appropriate.  Using a sterile surgical marker, an appropriate advancement flap was drawn incorporating the defect and placing the expected incisions within the relaxed skin tension lines where possible. The area thus outlined was incised deep to adipose tissue with a #15 scalpel blade.  The skin margins were undermined to an appropriate distance in all directions utilizing iris scissors.
Composite Graft Text: The defect edges were debeveled with a #15 scalpel blade.  Given the location of the defect, shape of the defect, the proximity to free margins and the fact the defect was full thickness a composite graft was deemed most appropriate.  The defect was outline and then transferred to the donor site.  A full thickness graft was then excised from the donor site. The graft was then placed in the primary defect, oriented appropriately and then sutured into place.  The secondary defect was then repaired using a primary closure.
Fusiform Excision Additional Text (Leave Blank If You Do Not Want): The margin was drawn around the clinically apparent lesion.  A fusiform shape was then drawn on the skin incorporating the lesion and margins.  Incisions were then made along these lines to the appropriate tissue plane and the lesion was extirpated.
Additional Anesthesia Volume In Cc: 5
Complex Repair And Transposition Flap Text: The defect edges were debeveled with a #15 scalpel blade.  The primary defect was closed partially with a complex linear closure.  Given the location of the remaining defect, shape of the defect and the proximity to free margins a transposition flap was deemed most appropriate for complete closure of the defect.  Using a sterile surgical marker, an appropriate advancement flap was drawn incorporating the defect and placing the expected incisions within the relaxed skin tension lines where possible.    The area thus outlined was incised deep to adipose tissue with a #15 scalpel blade.  The skin margins were undermined to an appropriate distance in all directions utilizing iris scissors.
Helical Rim Advancement Flap Text: The defect edges were debeveled with a #15 blade scalpel.  Given the location of the defect and the proximity to free margins (helical rim) a double helical rim advancement flap was deemed most appropriate.  Using a sterile surgical marker, the appropriate advancement flaps were drawn incorporating the defect and placing the expected incisions between the helical rim and antihelix where possible.  The area thus outlined was incised through and through with a #15 scalpel blade.  With a skin hook and iris scissors, the flaps were gently and sharply undermined and freed up.
O-Z Plasty Text: The defect edges were debeveled with a #15 scalpel blade.  Given the location of the defect, shape of the defect and the proximity to free margins an O-Z plasty (double transposition flap) was deemed most appropriate.  Using a sterile surgical marker, the appropriate transposition flaps were drawn incorporating the defect and placing the expected incisions within the relaxed skin tension lines where possible.    The area thus outlined was incised deep to adipose tissue with a #15 scalpel blade.  The skin margins were undermined to an appropriate distance in all directions utilizing iris scissors.  Hemostasis was achieved with electrocautery.  The flaps were then transposed into place, one clockwise and the other counterclockwise, and anchored with interrupted buried subcutaneous sutures.
Advancement-Rotation Flap Text: The defect edges were debeveled with a #15 scalpel blade.  Given the location of the defect, shape of the defect and the proximity to free margins an advancement-rotation flap was deemed most appropriate.  Using a sterile surgical marker, an appropriate flap was drawn incorporating the defect and placing the expected incisions within the relaxed skin tension lines where possible. The area thus outlined was incised deep to adipose tissue with a #15 scalpel blade.  The skin margins were undermined to an appropriate distance in all directions utilizing iris scissors.
Epidermal Autograft Text: The defect edges were debeveled with a #15 scalpel blade.  Given the location of the defect, shape of the defect and the proximity to free margins an epidermal autograft was deemed most appropriate.  Using a sterile surgical marker, the primary defect shape was transferred to the donor site. The epidermal graft was then harvested.  The skin graft was then placed in the primary defect and oriented appropriately.
Undermining Location (Optional): in the superficial subcutaneous fat
Curvilinear Excision Additional Text (Leave Blank If You Do Not Want): The margin was drawn around the clinically apparent lesion.  A curvilinear shape was then drawn on the skin incorporating the lesion and margins.  Incisions were then made along these lines to the appropriate tissue plane and the lesion was extirpated.
Complex Repair And V-Y Plasty Text: The defect edges were debeveled with a #15 scalpel blade.  The primary defect was closed partially with a complex linear closure.  Given the location of the remaining defect, shape of the defect and the proximity to free margins a V-Y plasty was deemed most appropriate for complete closure of the defect.  Using a sterile surgical marker, an appropriate advancement flap was drawn incorporating the defect and placing the expected incisions within the relaxed skin tension lines where possible.    The area thus outlined was incised deep to adipose tissue with a #15 scalpel blade.  The skin margins were undermined to an appropriate distance in all directions utilizing iris scissors.
Wound Care: Petrolatum
Bi-Rhombic Flap Text: The defect edges were debeveled with a #15 scalpel blade.  Given the location of the defect and the proximity to free margins a bi-rhombic flap was deemed most appropriate.  Using a sterile surgical marker, an appropriate rhombic flap was drawn incorporating the defect. The area thus outlined was incised deep to adipose tissue with a #15 scalpel blade.  The skin margins were undermined to an appropriate distance in all directions utilizing iris scissors.
Double O-Z Plasty Text: The defect edges were debeveled with a #15 scalpel blade.  Given the location of the defect, shape of the defect and the proximity to free margins a Double O-Z plasty (double transposition flap) was deemed most appropriate.  Using a sterile surgical marker, the appropriate transposition flaps were drawn incorporating the defect and placing the expected incisions within the relaxed skin tension lines where possible. The area thus outlined was incised deep to adipose tissue with a #15 scalpel blade.  The skin margins were undermined to an appropriate distance in all directions utilizing iris scissors.  Hemostasis was achieved with electrocautery.  The flaps were then transposed into place, one clockwise and the other counterclockwise, and anchored with interrupted buried subcutaneous sutures.
Post-Care Instructions: I reviewed with the patient in detail post-care instructions. Patient is not to engage in any heavy lifting, exercise, or swimming for the next 14 days. Should the patient develop any fevers, chills, bleeding, severe pain patient will contact the office immediately.
V-Y Flap Text: The defect edges were debeveled with a #15 scalpel blade.  Given the location of the defect, shape of the defect and the proximity to free margins a V-Y flap was deemed most appropriate.  Using a sterile surgical marker, an appropriate advancement flap was drawn incorporating the defect and placing the expected incisions within the relaxed skin tension lines where possible.    The area thus outlined was incised deep to adipose tissue with a #15 scalpel blade.  The skin margins were undermined to an appropriate distance in all directions utilizing iris scissors.
Dermal Autograft Text: The defect edges were debeveled with a #15 scalpel blade.  Given the location of the defect, shape of the defect and the proximity to free margins a dermal autograft was deemed most appropriate.  Using a sterile surgical marker, the primary defect shape was transferred to the donor site. The area thus outlined was incised deep to adipose tissue with a #15 scalpel blade.  The harvested graft was then trimmed of adipose and epidermal tissue until only dermis was left.  The skin graft was then placed in the primary defect and oriented appropriately.
Anesthesia Volume In Cc: 6
Double M-Plasty Intermediate Repair Preamble Text (Leave Blank If You Do Not Want): Undermining was performed with blunt dissection.
Rhomboid Transposition Flap Text: The defect edges were debeveled with a #15 scalpel blade.  Given the location of the defect and the proximity to free margins a rhomboid transposition flap was deemed most appropriate.  Using a sterile surgical marker, an appropriate rhomboid flap was drawn incorporating the defect.    The area thus outlined was incised deep to adipose tissue with a #15 scalpel blade.  The skin margins were undermined to an appropriate distance in all directions utilizing iris scissors.
S Plasty Text: Given the location and shape of the defect, and the orientation of relaxed skin tension lines, an S-plasty was deemed most appropriate for repair.  Using a sterile surgical marker, the appropriate outline of the S-plasty was drawn, incorporating the defect and placing the expected incisions within the relaxed skin tension lines where possible.  The area thus outlined was incised deep to adipose tissue with a #15 scalpel blade.  The skin margins were undermined to an appropriate distance in all directions utilizing iris scissors. The skin flaps were advanced over the defect.  The opposing margins were then approximated with interrupted buried subcutaneous sutures.
Double O-Z Flap Text: The defect edges were debeveled with a #15 scalpel blade.  Given the location of the defect, shape of the defect and the proximity to free margins a Double O-Z flap was deemed most appropriate.  Using a sterile surgical marker, an appropriate transposition flap was drawn incorporating the defect and placing the expected incisions within the relaxed skin tension lines where possible. The area thus outlined was incised deep to adipose tissue with a #15 scalpel blade.  The skin margins were undermined to an appropriate distance in all directions utilizing iris scissors.
Skin Substitute Text: The defect edges were debeveled with a #15 scalpel blade.  Given the location of the defect, shape of the defect and the proximity to free margins a skin substitute graft was deemed most appropriate.  The graft material was trimmed to fit the size of the defect. The graft was then placed in the primary defect and oriented appropriately.
Home Suture Removal Text: Patient was provided a home suture removal kit and will remove their sutures at home.  If they have any questions or difficulties they will call the office.
Complex Repair And M Plasty Text: The defect edges were debeveled with a #15 scalpel blade.  The primary defect was closed partially with a complex linear closure.  Given the location of the remaining defect, shape of the defect and the proximity to free margins an M plasty was deemed most appropriate for complete closure of the defect.  Using a sterile surgical marker, an appropriate advancement flap was drawn incorporating the defect and placing the expected incisions within the relaxed skin tension lines where possible.    The area thus outlined was incised deep to adipose tissue with a #15 scalpel blade.  The skin margins were undermined to an appropriate distance in all directions utilizing iris scissors.
Primary Defect Width (In Cm): 0
Suture Removal: 14 days
Rhombic Flap Text: The defect edges were debeveled with a #15 scalpel blade.  Given the location of the defect and the proximity to free margins a rhombic flap was deemed most appropriate.  Using a sterile surgical marker, an appropriate rhombic flap was drawn incorporating the defect.    The area thus outlined was incised deep to adipose tissue with a #15 scalpel blade.  The skin margins were undermined to an appropriate distance in all directions utilizing iris scissors.
Epidermal Closure Graft Donor Site (Optional): simple interrupted
V-Y Plasty Text: The defect edges were debeveled with a #15 scalpel blade.  Given the location of the defect, shape of the defect and the proximity to free margins an V-Y advancement flap was deemed most appropriate.  Using a sterile surgical marker, an appropriate advancement flap was drawn incorporating the defect and placing the expected incisions within the relaxed skin tension lines where possible.    The area thus outlined was incised deep to adipose tissue with a #15 scalpel blade.  The skin margins were undermined to an appropriate distance in all directions utilizing iris scissors.
Information: Selecting Yes will display possible errors in your note based on the variables you have selected. This validation is only offered as a suggestion for you. PLEASE NOTE THAT THE VALIDATION TEXT WILL BE REMOVED WHEN YOU FINALIZE YOUR NOTE. IF YOU WANT TO FAX A PRELIMINARY NOTE YOU WILL NEED TO TOGGLE THIS TO 'NO' IF YOU DO NOT WANT IT IN YOUR FAXED NOTE.
O-Z Flap Text: The defect edges were debeveled with a #15 scalpel blade.  Given the location of the defect, shape of the defect and the proximity to free margins an O-Z flap was deemed most appropriate.  Using a sterile surgical marker, an appropriate transposition flap was drawn incorporating the defect and placing the expected incisions within the relaxed skin tension lines where possible. The area thus outlined was incised deep to adipose tissue with a #15 scalpel blade.  The skin margins were undermined to an appropriate distance in all directions utilizing iris scissors.
Tissue Cultured Epidermal Autograft Text: The defect edges were debeveled with a #15 scalpel blade.  Given the location of the defect, shape of the defect and the proximity to free margins a tissue cultured epidermal autograft was deemed most appropriate.  The graft was then trimmed to fit the size of the defect.  The graft was then placed in the primary defect and oriented appropriately.
Complex Repair And Double M Plasty Text: The defect edges were debeveled with a #15 scalpel blade.  The primary defect was closed partially with a complex linear closure.  Given the location of the remaining defect, shape of the defect and the proximity to free margins a double M plasty was deemed most appropriate for complete closure of the defect.  Using a sterile surgical marker, an appropriate advancement flap was drawn incorporating the defect and placing the expected incisions within the relaxed skin tension lines where possible.    The area thus outlined was incised deep to adipose tissue with a #15 scalpel blade.  The skin margins were undermined to an appropriate distance in all directions utilizing iris scissors.
Anesthesia Type: 1% lidocaine with epinephrine
Melolabial Transposition Flap Text: The defect edges were debeveled with a #15 scalpel blade.  Given the location of the defect and the proximity to free margins a melolabial flap was deemed most appropriate.  Using a sterile surgical marker, an appropriate melolabial transposition flap was drawn incorporating the defect.    The area thus outlined was incised deep to adipose tissue with a #15 scalpel blade.  The skin margins were undermined to an appropriate distance in all directions utilizing iris scissors.
H Plasty Text: Given the location of the defect, shape of the defect and the proximity to free margins a H-plasty was deemed most appropriate for repair.  Using a sterile surgical marker, the appropriate advancement arms of the H-plasty were drawn incorporating the defect and placing the expected incisions within the relaxed skin tension lines where possible. The area thus outlined was incised deep to adipose tissue with a #15 scalpel blade. The skin margins were undermined to an appropriate distance in all directions utilizing iris scissors.  The opposing advancement arms were then advanced into place in opposite direction and anchored with interrupted buried subcutaneous sutures.
O-L Flap Text: The defect edges were debeveled with a #15 scalpel blade.  Given the location of the defect, shape of the defect and the proximity to free margins an O-L flap was deemed most appropriate.  Using a sterile surgical marker, an appropriate advancement flap was drawn incorporating the defect and placing the expected incisions within the relaxed skin tension lines where possible.    The area thus outlined was incised deep to adipose tissue with a #15 scalpel blade.  The skin margins were undermined to an appropriate distance in all directions utilizing iris scissors.
Xenograft Text: The defect edges were debeveled with a #15 scalpel blade.  Given the location of the defect, shape of the defect and the proximity to free margins a xenograft was deemed most appropriate.  The graft was then trimmed to fit the size of the defect.  The graft was then placed in the primary defect and oriented appropriately.
Complex Repair And W Plasty Text: The defect edges were debeveled with a #15 scalpel blade.  The primary defect was closed partially with a complex linear closure.  Given the location of the remaining defect, shape of the defect and the proximity to free margins a W plasty was deemed most appropriate for complete closure of the defect.  Using a sterile surgical marker, an appropriate advancement flap was drawn incorporating the defect and placing the expected incisions within the relaxed skin tension lines where possible.    The area thus outlined was incised deep to adipose tissue with a #15 scalpel blade.  The skin margins were undermined to an appropriate distance in all directions utilizing iris scissors.
W Plasty Text: The lesion was extirpated to the level of the fat with a #15 scalpel blade.  Given the location of the defect, shape of the defect and the proximity to free margins a W-plasty was deemed most appropriate for repair.  Using a sterile surgical marker, the appropriate transposition arms of the W-plasty were drawn incorporating the defect and placing the expected incisions within the relaxed skin tension lines where possible.    The area thus outlined was incised deep to adipose tissue with a #15 scalpel blade.  The skin margins were undermined to an appropriate distance in all directions utilizing iris scissors.  The opposing transposition arms were then transposed into place in opposite direction and anchored with interrupted buried subcutaneous sutures.
O-T Advancement Flap Text: The defect edges were debeveled with a #15 scalpel blade.  Given the location of the defect, shape of the defect and the proximity to free margins an O-T advancement flap was deemed most appropriate.  Using a sterile surgical marker, an appropriate advancement flap was drawn incorporating the defect and placing the expected incisions within the relaxed skin tension lines where possible.    The area thus outlined was incised deep to adipose tissue with a #15 scalpel blade.  The skin margins were undermined to an appropriate distance in all directions utilizing iris scissors.
Purse String (Intermediate) Text: Given the location of the defect and the characteristics of the surrounding skin a purse string intermediate closure was deemed most appropriate.  Undermining was performed circumfirentially around the surgical defect.  A purse string suture was then placed and tightened.
O-T Plasty Text: The defect edges were debeveled with a #15 scalpel blade.  Given the location of the defect, shape of the defect and the proximity to free margins an O-T plasty was deemed most appropriate.  Using a sterile surgical marker, an appropriate O-T plasty was drawn incorporating the defect and placing the expected incisions within the relaxed skin tension lines where possible.    The area thus outlined was incised deep to adipose tissue with a #15 scalpel blade.  The skin margins were undermined to an appropriate distance in all directions utilizing iris scissors.
Complex Repair And Z Plasty Text: The defect edges were debeveled with a #15 scalpel blade.  The primary defect was closed partially with a complex linear closure.  Given the location of the remaining defect, shape of the defect and the proximity to free margins a Z plasty was deemed most appropriate for complete closure of the defect.  Using a sterile surgical marker, an appropriate advancement flap was drawn incorporating the defect and placing the expected incisions within the relaxed skin tension lines where possible.    The area thus outlined was incised deep to adipose tissue with a #15 scalpel blade.  The skin margins were undermined to an appropriate distance in all directions utilizing iris scissors.
Size Of Lesion In Cm: 0.8
Double M-Plasty Complex Repair Preamble Text (Leave Blank If You Do Not Want): Extensive wide undermining was performed.
Z Plasty Text: The lesion was extirpated to the level of the fat with a #15 scalpel blade.  Given the location of the defect, shape of the defect and the proximity to free margins a Z-plasty was deemed most appropriate for repair.  Using a sterile surgical marker, the appropriate transposition arms of the Z-plasty were drawn incorporating the defect and placing the expected incisions within the relaxed skin tension lines where possible.    The area thus outlined was incised deep to adipose tissue with a #15 scalpel blade.  The skin margins were undermined to an appropriate distance in all directions utilizing iris scissors.  The opposing transposition arms were then transposed into place in opposite direction and anchored with interrupted buried subcutaneous sutures.
A-T Advancement Flap Text: The defect edges were debeveled with a #15 scalpel blade.  Given the location of the defect, shape of the defect and the proximity to free margins an A-T advancement flap was deemed most appropriate.  Using a sterile surgical marker, an appropriate advancement flap was drawn incorporating the defect and placing the expected incisions within the relaxed skin tension lines where possible.    The area thus outlined was incised deep to adipose tissue with a #15 scalpel blade.  The skin margins were undermined to an appropriate distance in all directions utilizing iris scissors.
Purse String (Simple) Text: Given the location of the defect and the characteristics of the surrounding skin a purse string simple closure was deemed most appropriate.  Undermining was performed circumferentially around the surgical defect.  A purse string suture was then placed and tightened.
Keystone Flap Text: The defect edges were debeveled with a #15 scalpel blade.  Given the location of the defect, shape of the defect a keystone flap was deemed most appropriate.  Using a sterile surgical marker, an appropriate keystone flap was drawn incorporating the defect, outlining the appropriate donor tissue and placing the expected incisions within the relaxed skin tension lines where possible. The area thus outlined was incised deep to adipose tissue with a #15 scalpel blade.  The skin margins were undermined to an appropriate distance in all directions around the primary defect and laterally outward around the flap utilizing iris scissors.
Complex Repair And Dorsal Nasal Flap Text: The defect edges were debeveled with a #15 scalpel blade.  The primary defect was closed partially with a complex linear closure.  Given the location of the remaining defect, shape of the defect and the proximity to free margins a dorsal nasal flap was deemed most appropriate for complete closure of the defect.  Using a sterile surgical marker, an appropriate flap was drawn incorporating the defect and placing the expected incisions within the relaxed skin tension lines where possible.    The area thus outlined was incised deep to adipose tissue with a #15 scalpel blade.  The skin margins were undermined to an appropriate distance in all directions utilizing iris scissors.
Cheek Interpolation Flap Text: A decision was made to reconstruct the defect utilizing an interpolation axial flap and a staged reconstruction.  A telfa template was made of the defect.  This telfa template was then used to outline the Cheek Interpolation flap.  The donor area for the pedicle flap was then injected with anesthesia.  The flap was excised through the skin and subcutaneous tissue down to the layer of the underlying musculature.  The interpolation flap was carefully excised within this deep plane to maintain its blood supply.  The edges of the donor site were undermined.   The donor site was closed in a primary fashion.  The pedicle was then rotated into position and sutured.  Once the tube was sutured into place, adequate blood supply was confirmed with blanching and refill.  The pedicle was then wrapped with xeroform gauze and dressed appropriately with a telfa and gauze bandage to ensure continued blood supply and protect the attached pedicle.
Crescentic Advancement Flap Text: The defect edges were debeveled with a #15 scalpel blade.  Given the location of the defect and the proximity to free margins a crescentic advancement flap was deemed most appropriate.  Using a sterile surgical marker, the appropriate advancement flap was drawn incorporating the defect and placing the expected incisions within the relaxed skin tension lines where possible.    The area thus outlined was incised deep to adipose tissue with a #15 scalpel blade.  The skin margins were undermined to an appropriate distance in all directions utilizing iris scissors.
Partial Purse String (Intermediate) Text: Given the location of the defect and the characteristics of the surrounding skin an intermediate purse string closure was deemed most appropriate.  Undermining was performed circumferentially around the surgical defect.  A purse string suture was then placed and tightened. Wound tension of the circular defect prevented complete closure of the wound.
Island Pedicle Flap-Requiring Vessel Identification Text: The defect edges were debeveled with a #15 scalpel blade.  Given the location of the defect, shape of the defect and the proximity to free margins an island pedicle advancement flap was deemed most appropriate.  Using a sterile surgical marker, an appropriate advancement flap was drawn, based on the axial vessel mentioned above, incorporating the defect, outlining the appropriate donor tissue and placing the expected incisions within the relaxed skin tension lines where possible.    The area thus outlined was incised deep to adipose tissue with a #15 scalpel blade.  The skin margins were undermined to an appropriate distance in all directions around the primary defect and laterally outward around the island pedicle utilizing iris scissors.  There was minimal undermining beneath the pedicle flap.
Complex Repair And Ftsg Text: The defect edges were debeveled with a #15 scalpel blade.  The primary defect was closed partially with a complex linear closure.  Given the location of the defect, shape of the defect and the proximity to free margins a full thickness skin graft was deemed most appropriate to repair the remaining defect.  The graft was trimmed to fit the size of the remaining defect.  The graft was then placed in the primary defect, oriented appropriately, and sutured into place.
Burow's Advancement Flap Text: The defect edges were debeveled with a #15 scalpel blade.  Given the location of the defect and the proximity to free margins a Burow's advancement flap was deemed most appropriate.  Using a sterile surgical marker, the appropriate advancement flap was drawn incorporating the defect and placing the expected incisions within the relaxed skin tension lines where possible.    The area thus outlined was incised deep to adipose tissue with a #15 scalpel blade.  The skin margins were undermined to an appropriate distance in all directions utilizing iris scissors.
Partial Purse String (Simple) Text: Given the location of the defect and the characteristics of the surrounding skin a simple purse string closure was deemed most appropriate.  Undermining was performed circumferentially around the surgical defect.  A purse string suture was then placed and tightened. Wound tension of the circular defect prevented complete closure of the wound.
Double Island Pedicle Flap Text: The defect edges were debeveled with a #15 scalpel blade.  Given the location of the defect, shape of the defect and the proximity to free margins a double island pedicle advancement flap was deemed most appropriate.  Using a sterile surgical marker, an appropriate advancement flap was drawn incorporating the defect, outlining the appropriate donor tissue and placing the expected incisions within the relaxed skin tension lines where possible.    The area thus outlined was incised deep to adipose tissue with a #15 scalpel blade.  The skin margins were undermined to an appropriate distance in all directions around the primary defect and laterally outward around the island pedicle utilizing iris scissors.  There was minimal undermining beneath the pedicle flap.
Complex Repair And Split-Thickness Skin Graft Text: The defect edges were debeveled with a #15 scalpel blade.  The primary defect was closed partially with a complex linear closure.  Given the location of the defect, shape of the defect and the proximity to free margins a split thickness skin graft was deemed most appropriate to repair the remaining defect.  The graft was trimmed to fit the size of the remaining defect.  The graft was then placed in the primary defect, oriented appropriately, and sutured into place.
Cheek-To-Nose Interpolation Flap Text: A decision was made to reconstruct the defect utilizing an interpolation axial flap and a staged reconstruction.  A telfa template was made of the defect.  This telfa template was then used to outline the Cheek-To-Nose Interpolation flap.  The donor area for the pedicle flap was then injected with anesthesia.  The flap was excised through the skin and subcutaneous tissue down to the layer of the underlying musculature.  The interpolation flap was carefully excised within this deep plane to maintain its blood supply.  The edges of the donor site were undermined.   The donor site was closed in a primary fashion.  The pedicle was then rotated into position and sutured.  Once the tube was sutured into place, adequate blood supply was confirmed with blanching and refill.  The pedicle was then wrapped with xeroform gauze and dressed appropriately with a telfa and gauze bandage to ensure continued blood supply and protect the attached pedicle.
Excision Method: Elliptical
Advancement Flap (Double) Text: The defect edges were debeveled with a #15 scalpel blade.  Given the location of the defect and the proximity to free margins a double advancement flap was deemed most appropriate.  Using a sterile surgical marker, the appropriate advancement flaps were drawn incorporating the defect and placing the expected incisions within the relaxed skin tension lines where possible.    The area thus outlined was incised deep to adipose tissue with a #15 scalpel blade.  The skin margins were undermined to an appropriate distance in all directions utilizing iris scissors.
Complex Repair And Single Advancement Flap Text: The defect edges were debeveled with a #15 scalpel blade.  The primary defect was closed partially with a complex linear closure.  Given the location of the remaining defect, shape of the defect and the proximity to free margins a single advancement flap was deemed most appropriate for complete closure of the defect.  Using a sterile surgical marker, an appropriate advancement flap was drawn incorporating the defect and placing the expected incisions within the relaxed skin tension lines where possible.    The area thus outlined was incised deep to adipose tissue with a #15 scalpel blade.  The skin margins were undermined to an appropriate distance in all directions utilizing iris scissors.
Alar Island Pedicle Flap Text: The defect edges were debeveled with a #15 scalpel blade.  Given the location of the defect, shape of the defect and the proximity to the alar rim an island pedicle advancement flap was deemed most appropriate.  Using a sterile surgical marker, an appropriate advancement flap was drawn incorporating the defect, outlining the appropriate donor tissue and placing the expected incisions within the nasal ala running parallel to the alar rim. The area thus outlined was incised with a #15 scalpel blade.  The skin margins were undermined minimally to an appropriate distance in all directions around the primary defect and laterally outward around the island pedicle utilizing iris scissors.  There was minimal undermining beneath the pedicle flap.
Complex Repair And Epidermal Autograft Text: The defect edges were debeveled with a #15 scalpel blade.  The primary defect was closed partially with a complex linear closure.  Given the location of the defect, shape of the defect and the proximity to free margins an epidermal autograft was deemed most appropriate to repair the remaining defect.  The graft was trimmed to fit the size of the remaining defect.  The graft was then placed in the primary defect, oriented appropriately, and sutured into place.
Interpolation Flap Text: A decision was made to reconstruct the defect utilizing an interpolation axial flap and a staged reconstruction.  A telfa template was made of the defect.  This telfa template was then used to outline the interpolation flap.  The donor area for the pedicle flap was then injected with anesthesia.  The flap was excised through the skin and subcutaneous tissue down to the layer of the underlying musculature.  The interpolation flap was carefully excised within this deep plane to maintain its blood supply.  The edges of the donor site were undermined.   The donor site was closed in a primary fashion.  The pedicle was then rotated into position and sutured.  Once the tube was sutured into place, adequate blood supply was confirmed with blanching and refill.  The pedicle was then wrapped with xeroform gauze and dressed appropriately with a telfa and gauze bandage to ensure continued blood supply and protect the attached pedicle.
Scalpel Size: 15 blade
Muscle Hinge Flap Text: The defect edges were debeveled with a #15 scalpel blade.  Given the size, depth and location of the defect and the proximity to free margins a muscle hinge flap was deemed most appropriate.  Using a sterile surgical marker, an appropriate hinge flap was drawn incorporating the defect. The area thus outlined was incised with a #15 scalpel blade.  The skin margins were undermined to an appropriate distance in all directions utilizing iris scissors.
Detail Level: Detailed
Advancement Flap (Single) Text: The defect edges were debeveled with a #15 scalpel blade.  Given the location of the defect and the proximity to free margins a single advancement flap was deemed most appropriate.  Using a sterile surgical marker, an appropriate advancement flap was drawn incorporating the defect and placing the expected incisions within the relaxed skin tension lines where possible.    The area thus outlined was incised deep to adipose tissue with a #15 scalpel blade.  The skin margins were undermined to an appropriate distance in all directions utilizing iris scissors.
Complex Repair And Double Advancement Flap Text: The defect edges were debeveled with a #15 scalpel blade.  The primary defect was closed partially with a complex linear closure.  Given the location of the remaining defect, shape of the defect and the proximity to free margins a double advancement flap was deemed most appropriate for complete closure of the defect.  Using a sterile surgical marker, an appropriate advancement flap was drawn incorporating the defect and placing the expected incisions within the relaxed skin tension lines where possible.    The area thus outlined was incised deep to adipose tissue with a #15 scalpel blade.  The skin margins were undermined to an appropriate distance in all directions utilizing iris scissors.
Island Pedicle Flap With Canthal Suspension Text: The defect edges were debeveled with a #15 scalpel blade.  Given the location of the defect, shape of the defect and the proximity to free margins an island pedicle advancement flap was deemed most appropriate.  Using a sterile surgical marker, an appropriate advancement flap was drawn incorporating the defect, outlining the appropriate donor tissue and placing the expected incisions within the relaxed skin tension lines where possible. The area thus outlined was incised deep to adipose tissue with a #15 scalpel blade.  The skin margins were undermined to an appropriate distance in all directions around the primary defect and laterally outward around the island pedicle utilizing iris scissors.  There was minimal undermining beneath the pedicle flap. A suspension suture was placed in the canthal tendon to prevent tension and prevent ectropion.
Complex Repair And Dermal Autograft Text: The defect edges were debeveled with a #15 scalpel blade.  The primary defect was closed partially with a complex linear closure.  Given the location of the defect, shape of the defect and the proximity to free margins an dermal autograft was deemed most appropriate to repair the remaining defect.  The graft was trimmed to fit the size of the remaining defect.  The graft was then placed in the primary defect, oriented appropriately, and sutured into place.
Melolabial Interpolation Flap Text: A decision was made to reconstruct the defect utilizing an interpolation axial flap and a staged reconstruction.  A telfa template was made of the defect.  This telfa template was then used to outline the melolabial interpolation flap.  The donor area for the pedicle flap was then injected with anesthesia.  The flap was excised through the skin and subcutaneous tissue down to the layer of the underlying musculature.  The pedicle flap was carefully excised within this deep plane to maintain its blood supply.  The edges of the donor site were undermined.   The donor site was closed in a primary fashion.  The pedicle was then rotated into position and sutured.  Once the tube was sutured into place, adequate blood supply was confirmed with blanching and refill.  The pedicle was then wrapped with xeroform gauze and dressed appropriately with a telfa and gauze bandage to ensure continued blood supply and protect the attached pedicle.
Epidermal Sutures: 4-0 Nylon
Transposition Flap Text: The defect edges were debeveled with a #15 scalpel blade.  Given the location of the defect and the proximity to free margins a transposition flap was deemed most appropriate.  Using a sterile surgical marker, an appropriate transposition flap was drawn incorporating the defect.    The area thus outlined was incised deep to adipose tissue with a #15 scalpel blade.  The skin margins were undermined to an appropriate distance in all directions utilizing iris scissors.
No Repair - Repaired With Adjacent Surgical Defect Text (Leave Blank If You Do Not Want): After the excision the defect was repaired concurrently with another surgical defect which was in close approximation.
Complex Repair And Modified Advancement Flap Text: The defect edges were debeveled with a #15 scalpel blade.  The primary defect was closed partially with a complex linear closure.  Given the location of the remaining defect, shape of the defect and the proximity to free margins a modified advancement flap was deemed most appropriate for complete closure of the defect.  Using a sterile surgical marker, an appropriate advancement flap was drawn incorporating the defect and placing the expected incisions within the relaxed skin tension lines where possible.    The area thus outlined was incised deep to adipose tissue with a #15 scalpel blade.  The skin margins were undermined to an appropriate distance in all directions utilizing iris scissors.
Island Pedicle Flap Text: The defect edges were debeveled with a #15 scalpel blade.  Given the location of the defect, shape of the defect and the proximity to free margins an island pedicle advancement flap was deemed most appropriate.  Using a sterile surgical marker, an appropriate advancement flap was drawn incorporating the defect, outlining the appropriate donor tissue and placing the expected incisions within the relaxed skin tension lines where possible.    The area thus outlined was incised deep to adipose tissue with a #15 scalpel blade.  The skin margins were undermined to an appropriate distance in all directions around the primary defect and laterally outward around the island pedicle utilizing iris scissors.  There was minimal undermining beneath the pedicle flap.
Complex Repair And Tissue Cultured Epidermal Autograft Text: The defect edges were debeveled with a #15 scalpel blade.  The primary defect was closed partially with a complex linear closure.  Given the location of the defect, shape of the defect and the proximity to free margins an tissue cultured epidermal autograft was deemed most appropriate to repair the remaining defect.  The graft was trimmed to fit the size of the remaining defect.  The graft was then placed in the primary defect, oriented appropriately, and sutured into place.
Mastoid Interpolation Flap Text: A decision was made to reconstruct the defect utilizing an interpolation axial flap and a staged reconstruction.  A telfa template was made of the defect.  This telfa template was then used to outline the mastoid interpolation flap.  The donor area for the pedicle flap was then injected with anesthesia.  The flap was excised through the skin and subcutaneous tissue down to the layer of the underlying musculature.  The pedicle flap was carefully excised within this deep plane to maintain its blood supply.  The edges of the donor site were undermined.   The donor site was closed in a primary fashion.  The pedicle was then rotated into position and sutured.  Once the tube was sutured into place, adequate blood supply was confirmed with blanching and refill.  The pedicle was then wrapped with xeroform gauze and dressed appropriately with a telfa and gauze bandage to ensure continued blood supply and protect the attached pedicle.
Excision Depth: adipose tissue
Repair Type: Intermediate

## 2019-06-06 NOTE — PROGRESS NOTES
Chief Complaint- joint pain    Subjective:   Marry Mathur is a 83 y.o. female here today for follow up of rheumatological issues    This is a follow-up visit for this patient who is seen in this clinic for significant osteoarthritis, patient has a diagnosis of rheumatoid arthritis in the remote past status post a number of Biologics and DMARDs for which most have been stopped because of the development of melanoma for which patient is still battling and also development of colonic adenocarcinoma status post resection December 2019.  Patient is currently only on Plaquenil at 200 mg p.o. twice daily, had stopped about a week ago because of diarrhea which is now resolved, patient is interested in restarting.  Of note patient's next ophthalmology evaluation September 2019.       Patient still in a lot of pain in her left hip status post MRI January 2019 that shows extensive DJD and labral tear currently being seen by Dr. Cross at [facility] orth[facility], patient is not sure whether she is getting adequate treatment and would like a second opinion with Dr. Boyle in orthopedics.      Other issues include a finding of osteoporosis on patient's bone density scan from March 2018, patient was not able to tolerate Fosamax because of a history of Araya's esophagus, we did give patient information on Prolia injections at last visit and patient states today that she is willing to start Prolia 60 mg subcu every 6 months.        Additional comorbidities include diabetes for which patient  takes metformin, also with a diagnosis of spinal stenosis with intermittent weakness in her legs with progressive numbness and weakness in the left leg.  Patient also with protein S deficiency with a history of DVT on chronic anticoagulation.  Patient also with a history of Araya's esophagus.    Left TKA         S/p Remicade-stopped because of hx of melanoma about 1996 and has multiple other skin cancers  S/p Orencia-lost  efficacy  S/p Humira-stopped because of recurrence of melanoma October 2017  S/p MTX-stopped because of development of skin cancer/melanoma October 2017  S/p NSAIDS-relatively contraindicated as patient is on chronic anticoagulation   S/p xeljanz-stopped because of the development of colonic adenocarcinoma  S/p fosamax-unable to tolerate-GI upset     G6PD 13.6 adequate 5/2019  DEXA 3/18/2016 T scores 1.1, -1.1   FRAX 3/18/2016 major osteoporotic fracture risk 14.3%, hip fracture risks 3.7%  DEXA 3/23/2018 T scores 0.2, -1.4  FRAX 3/23/2018 major osteoporotic fracture risk 19.3%, hip fracture risk 6.1%  Prolia started 6/2019  Echocardiogram 7/2012 [facility]  RF neg 3/2014 [facility]; RF neg 6/2014 [facility]; RF neg 6/2016  CCP neg 3/2014 [facility]; CCP neg 6/2014 [facility]; CCP neg 6/2016  Uric acid 4.7 3/2014 [facility];Uric acid 4.5 6/2016  Hep B neg 6/2016  Quantiferon Gold neg 6/2014 [facility]; Quantiferon Gold neg 6/2016  Hand x-rays 3/2016-indicate osteoarthritis  Feet x-rays 3/2016-indicate osteoarthritis     Corticosteroid Therapy Informed Consent signed 1/17/2019-copy given to patient      Current medicines (including changes today)  Current Outpatient Prescriptions   Medication Sig Dispense Refill   • hydroxychloroquine (PLAQUENIL) 200 MG Tab 1 tab po bid 180 Tab 0   • LYRICA 100 MG Cap      • omeprazole (PRILOSEC) 20 MG delayed-release capsule      • nystatin/triamcinolone (MYCOLOG) 495511-7.1 UNIT/GM-% Cream      • lidocaine (LIDODERM) 5 % Patch Apply 1 Patch to skin as directed every 24 hours. 90 Patch 0   • rivaroxaban (XARELTO) 20 MG Tab tablet Take 1 Tab by mouth with dinner. 90 Tab 1   • atorvastatin (LIPITOR) 10 MG Tab Take 10 mg by mouth every evening.     • omeprazole (PRILOSEC) 20 MG Tablet Delayed Response delayed-release tablet Take 1 Tab by mouth every morning.     • Diphenhydramine-APAP, sleep, (TYLENOL PM EXTRA STRENGTH)  MG Tab Take 2 Tabs by mouth every bedtime.     •  acetaminophen (TYLENOL) 500 MG Tab Take 1,000 mg by mouth every morning.     • Melatonin 10 MG Tab Take 1 Tab by mouth every bedtime.     • vitamin D (CHOLECALCIFEROL) 1000 UNIT Tab Take 1,000 Units by mouth every morning.     • POTASSIUM GLUCONATE Take 1 Tab by mouth every morning. Pt unsure of dose     • Calcium Carbonate-Vitamin D (CALCIUM + D PO) Take 1 Tab by mouth every bedtime.     • methylPREDNISolone (MEDROL) 4 MG Tab 1 tab po qday 30 Tab 0   • ondansetron (ZOFRAN ODT) 4 MG TABLET DISPERSIBLE Take 1 Tab by mouth every 6 hours as needed for Nausea. (Patient not taking: Reported on 6/6/2019) 12 Tab 0   • miconazole (MICOTIN) 2 % Cream Apply to the affected area twice a day. (Patient not taking: Reported on 5/2/2019) 1 Tube 1   • FLUOROURACIL EX 1 Each by Apply externally route as needed.       Current Facility-Administered Medications   Medication Dose Route Frequency Provider Last Rate Last Dose   • denosumab (PROLIA) subq injection 60 mg  60 mg Subcutaneous Q6 MO Deanna Escoto M.D.         She  has a past medical history of Adenocarcinoma of colon ([facility]) (10/30/2018); Anesthesia; Atrial fibrillation [I48.91] (4/19/2016); Back pain (6/27/2012); Blood clotting disorder ([facility]) (2012); Bowel habit changes; Cancer ([facility]); Cataract; Chronic back pain greater than 3 months duration; Deep vein thrombosis ([facility]) (3/8/2016); Essential hypertension, benign (6/27/2012); GERD (gastroesophageal reflux disease) (6/27/2012); Heart murmur; Hyperlipidemia; Hypertension; Impaired fasting glucose (11/2/2017); Mild aortic stenosis (11/2/2017); Other and unspecified hyperlipidemia (6/27/2012); Prediabetes; Protein S deficiency ([facility]) (11/2/2017); Rheumatoid arthritis involving multiple sites with positive rheumatoid factor ([facility]) (03/14/2016); Rheumatoid nodule ([facility]) (7/25/2017); Right bundle branch block (6/27/2012); and Urinary incontinence (11/2/2017).    ROS   Other than what is mentioned in HPI or physical exam, there is  no history of headaches, double vision or blurred vision. No temporal tenderness or jaw claudication. No history of cataracts or glaucoma. No trouble swallowing difficulties or sore throats.  No chest complaints including chest pain, cough or sputum production. No GI complaints including nausea, vomiting, change in bowel habits, or past peptic ulcer disease. No history of blood in the stools. No urinary complaints including dysuria or frequency. No history of alopecia, photosensitivity, oral ulcerations, Raynaud's phenomena.       Objective:     /70   Pulse 74   Temp 36.3 °C (97.4 °F) (Temporal)   Resp 14   Wt 75.3 kg (166 lb 0.1 oz)   SpO2 95%  Body mass index is 29.41 kg/m².   Physical Exam:    Constitutional: Alert and oriented X3, patient is talkative with good eye contact.Skin: Warm, dry, good turgor, patient points to an area on her left upper arm erythematous area about almost an inch in diameter, patient was told that this is a recurrence of melanoma eye: Equal, round and reactive, conjunctiva clear, lids normal EOM intactENMT: Lips without lesions, good dentition, no oropharyngeal ulcers, moist buccal mucosa, pinna without deformityNeck: Trachea midline, no masses, no thyromegaly.Lymph:  No cervical lymphadenopathy, no axillary lymphadenopathy, no supraclavicular lymphadenopathyRespiratory: Unlabored respiratory effort, lungs clear to auscultation, no wheezes, no ronchi.Cardiovascular: Normal S1, S2, no murmur, no edema.Abdomen: Soft, non-tender, no masses, no hepatosplenomegaly.Psych: Alert and oriented x3, normal affect and mood.Neuro: Cranial nerves 2-12 are grossly intact, no loss of sensation LEExt:no joint laxity noted in bilateral arms, no joint laxity noted in bilateral legs,, patient does have extensive Heberden's nodes on most DIP joints of both hands, there is also PIP joint swelling in the right fourth PIP joint and also bilateral fifth PIP joints compatible with Ana's nodes, no  evidence of swan-neck or boutonniere deformities no sausage digits no dactylitis, toes without any crossover toes without splay toes, patient has a left TKA well-healed scar, bony hypertrophy of the right knee without effusions    Lab Results   Component Value Date/Time    QNTTBGOLD Negative 06/29/2016 02:03 PM     Lab Results   Component Value Date/Time    HEPBCORIGM Negative 06/29/2016 02:03 PM    HEPBSAG Negative 06/29/2016 02:03 PM     Lab Results   Component Value Date/Time    SODIUM 145 05/20/2019 10:01 AM    POTASSIUM 4.1 05/20/2019 10:01 AM    CHLORIDE 107 05/20/2019 10:01 AM    CO2 27 05/20/2019 10:01 AM    GLUCOSE 88 05/20/2019 10:01 AM    BUN 17 05/20/2019 10:01 AM    CREATININE 0.74 05/20/2019 10:01 AM      Lab Results   Component Value Date/Time    WBC 5.6 05/20/2019 10:01 AM    RBC 5.09 05/20/2019 10:01 AM    HEMOGLOBIN 15.0 05/20/2019 10:01 AM    HEMATOCRIT 47.8 (H) 05/20/2019 10:01 AM    MCV 93.9 05/20/2019 10:01 AM    MCH 29.5 05/20/2019 10:01 AM    MCHC 31.4 (L) 05/20/2019 10:01 AM    MPV 11.8 05/20/2019 10:01 AM    NEUTSPOLYS 64.20 05/20/2019 10:01 AM    LYMPHOCYTES 24.10 05/20/2019 10:01 AM    MONOCYTES 7.60 05/20/2019 10:01 AM    EOSINOPHILS 3.00 05/20/2019 10:01 AM    BASOPHILS 0.90 05/20/2019 10:01 AM      Lab Results   Component Value Date/Time    CALCIUM 8.8 05/20/2019 10:01 AM    ASTSGOT 23 11/01/2018 11:01 AM    ALTSGPT 24 11/01/2018 11:01 AM    ALKPHOSPHAT 62 11/01/2018 11:01 AM    TBILIRUBIN 0.7 11/01/2018 11:01 AM    ALBUMIN 4.3 11/01/2018 11:01 AM    TOTPROTEIN 6.8 11/01/2018 11:01 AM     Lab Results   Component Value Date/Time    URICACID 4.5 06/29/2016 02:03 PM    RHEUMFACTN <10 06/29/2016 02:03 PM    CCPANTIBODY 4 06/29/2016 02:03 PM     Lab Results   Component Value Date/Time    SEDRATEWES 11 07/07/2018 11:25 AM     Lab Results   Component Value Date/Time    HBA1C 5.5 05/16/2016 07:50 AM     Lab Results   Component Value Date/Time    G6PD 13.6 05/20/2019 10:01 AM     Lab Results    Component Value Date/Time    CPKTOTAL 61 06/29/2016 02:03 PM     Results for orders placed during the hospital encounter of 03/18/16   DX-JOINT SURVEY-HANDS SINGLE VIEW    Impression Multiple bilateral sites of osteoarthritis     Results for orders placed during the hospital encounter of 03/18/16   DX-JOINT SURVEY-FEET SINGLE VIEW    Impression 1.  Bilateral midfoot and forefoot osteoarthritis    2.  Bilateral bunion    3.  Prior fusion across the right 2nd proximal interphalangeal joint    4.  Foreign body or opaque material between 1st and 2nd phalanges     Results for orders placed during the hospital encounter of 03/23/18   DS-BONE DENSITY STUDY (DEXA)    Impression According to the World Health Organization classification, bone mineral density of this patient is osteopenia with increased risk of fracture. Percentage decrease in bone mineral density in the lumbar region is statistically significant.        10-year Probability of Fracture:  Major Osteoporotic     19.3%  Hip     6.1%  Population      [place] ()    Based on left femur neck BMD          INTERPRETING LOCATION:  [address] [place] [address], [facility]     Results for orders placed during the hospital encounter of 01/14/09   DX-KNEE COMPLETE 4+    Impression IMPRESSION:     MILD PATELLOFEMORAL AND MEDIAL FEMOROTIBIAL COMPARTMENT DEGENERATIVE   OSTEOARTHROSIS.        GEK:leonie     Read By ALEX WISE MD on Jan 14 2009 10:01AM  : DANA Transcription Date: Jovi 15 2009  8:11AM  THIS DOCUMENT HAS BEEN ELECTRONICALLY SIGNED BY: ALEX WISE MD on   Jan 16 2009  1:32PM        Results for orders placed during the hospital encounter of 01/14/09   DX-SHOULDER 2+    Impression IMPRESSION:     NORMAL RADIOGRAPHS OF THE LEFT SHOULDER WITH NOTE MADE OF MINIMAL   DEGENERATIVE OSTEOARTHROSIS OF THE GLENOHUMERAL JOINT WITH MINIMAL   SPURRING.           Results for orders placed during the hospital encounter of 08/18/18   MR-LUMBAR SPINE-W/O     Impression Mild interval worsening L3/4 right paracentral protrusion results in mild worsening lateral recess stenosis    Otherwise stable multilevel degenerative change resulting in foraminal predominate stenoses, greatest is moderate to severe on the right at L3/4.    Lesser stenoses at other levels as detailed above    Stable L4/5 grade 1 anterolisthesis of secondary to severe facet arthropathy. Stable minimal degenerative retrolisthesis of the upper lumbar levels    Mature L5/S1 interbody fusion     Results for orders placed during the hospital encounter of 10/08/18   MR-KNEE-W/O LEFT    Impression Status post medial knee hemiarthroplasty without complication identified    Mild patellofemoral and lateral femorotibial osteoarthritis with some cartilage thinning and spurring but no full-thickness defects or areas of marrow edema are identified     Results for orders placed during the hospital encounter of 01/14/09   DX-CERVICAL SPINE-2 OR 3 VIEWS    Impression IMPRESSION:     DEGENERATIVE DISC AND FACET ARTHROPATHY C5-6, C6-7, AND DEGENERATIVE   FACET ARTHROPATHY AT C7-T1.           Assessment and Plan:     1. Rheumatoid arthritis involving multiple sites with positive rheumatoid factor (HCC)  Continue Plaquenil at 200 mg p.o. twice daily  - denosumab (PROLIA) subq injection 60 mg; Inject 1 mL as instructed every 6 months.  - hydroxychloroquine (PLAQUENIL) 200 MG Tab; 1 tab po bid  Dispense: 180 Tab; Refill: 0  - REFERRAL TO ORTHOPEDICS    2. Long-term use of Plaquenil  On Plaquenil 200 mg p.o. twice daily, next ophthalmology evaluation September 2019 if no G6PD levels are adequate, patient needs CBC every 6 months next CBC will be due about November 2019  - denosumab (PROLIA) subq injection 60 mg; Inject 1 mL as instructed every 6 months.  - hydroxychloroquine (PLAQUENIL) 200 MG Tab; 1 tab po bid  Dispense: 180 Tab; Refill: 0  - REFERRAL TO ORTHOPEDICS    3. Adenocarcinoma of colon (HCC)  Status post resection  December 2018, patient will need to be off Biologics for 5 years, patient does have a follow-up scheduled September 2019 for repeat colonoscopy  - hydroxychloroquine (PLAQUENIL) 200 MG Tab; 1 tab po bid  Dispense: 180 Tab; Refill: 0  - REFERRAL TO ORTHOPEDICS    4. Malignant melanoma of neck (HCC)  Patient's been having reoccurrence melanomas, new melanoma on the upper left arm followed by dermatology extensively, Biologics contraindicated as long as there is malignant melanoma issues.    5. Osteoporosis, unspecified osteoporosis type, unspecified pathological fracture presence  Last DEXA March 2018 indicating osteoporosis, next DEXA March 2020, patient unable to tolerate bisphosphonates, causes GI upset, we will start Prolia 60 mg subcu every 6 months today  - denosumab (PROLIA) subq injection 60 mg; Inject 1 mL as instructed every 6 months.    6. Hip pain, left  MRI done January 2019 indicates extensive DJD and labral tear, status post evaluation by Dr. Cross at [facility], patient wants a second opinion by Dr. Boyle orthopedics, referral submitted  - denosumab (PROLIA) subq injection 60 mg; Inject 1 mL as instructed every 6 months.  - hydroxychloroquine (PLAQUENIL) 200 MG Tab; 1 tab po bid  Dispense: 180 Tab; Refill: 0  - REFERRAL TO ORTHOPEDICS    7. Chronic anticoagulation  Because of a history of DVT and history of protein S deficiency  This will impact with kind of medications we can use for this patient's arthritis, NSAIDs are contraindicated because of increased risk of bleeding while on chronic anticoagulation    8. Essential hypertension, benign  May impact the type of medications we can use for this patient's arthritis. We will have to keep this under advisement.    9. Impaired fasting glucose  High blood sugar can exacerbate inflammatory state of patient's arthritis, discussed with patient the need to monitor and control blood sugars, patient states understanding    Followup: Return in about 3  months (around 9/6/2019). or sooner jose eduardo    Marry Mathur  was seen 40 minutes face-to-face of which more than 50% of the time was spent counseling the patient (excluding time for procedures)  regarding  rheumatological condition and care. Therapy was discussed in detail.      Please note that this dictation was created using voice recognition software. I have made every reasonable attempt to correct obvious errors, but I expect that there are errors of grammar and possibly content that I did not discover before finalizing the note.

## 2019-06-06 NOTE — LETTER
[facility]   [address] [place] [address], [address]  [place] [address]  Phone: [phone]  Fax: [phone]              Encounter Date: 6/6/2019    Dear Dr. Eaton ref. provider found,    It was a pleasure seeing your patient, Marry Mathur, on 6/6/2019. Diagnoses of Rheumatoid arthritis involving multiple sites with positive rheumatoid factor (HCC), Long-term use of Plaquenil, Adenocarcinoma of colon (HCC), Malignant melanoma of neck (HCC), Osteoporosis, unspecified osteoporosis type, unspecified pathological fracture presence, Hip pain, left, Atrial fibrillation, unspecified type (HCC), Chronic anticoagulation, Essential hypertension, benign, and Impaired fasting glucose were pertinent to this visit.     Please find attached progress note which includes the history I obtained from Ms. Mathur, my physical examination findings, my impression and recommendations.      Once again, it was a pleasure participating in your patient's care.  Please feel free to contact me if you have any questions or if I can be of any further assistance to your patients.      Sincerely,    Deanna Escoto M.D.  Electronically Signed          PROGRESS NOTE:   Marry Mathur is a 83 y.o. female here for a provider visit for Prolia 60mg sq injection.    Reason for injection: osteoporosis  Order in MAR?: Yes  Patient supplied?:No  Minimum interval has been met for this injection (per MAR order): Yes    Order and dose verified by: dr escoto  Patient tolerated injection and no adverse effects were observed or reported: Yes    # of Administrations remaining in MAR: 5

## 2019-06-17 ENCOUNTER — APPOINTMENT (RX ONLY)
Dept: URBAN - METROPOLITAN AREA CLINIC 36 | Facility: CLINIC | Age: 84
Setting detail: DERMATOLOGY
End: 2019-06-17

## 2019-06-17 PROBLEM — D04.39 CARCINOMA IN SITU OF SKIN OF OTHER PARTS OF FACE: Status: ACTIVE | Noted: 2019-06-17

## 2019-06-17 PROCEDURE — ? MOHS SURGERY

## 2019-06-17 NOTE — PROCEDURE: MOHS SURGERY
Mid-Level Procedure Text (A): After obtaining clear surgical margins the patient was sent to a mid-level provider for surgical repair.  The patient understands they will receive post-surgical care and follow-up from the mid-level provider.
Estimated Blood Loss (Cc): less than 5 cc
Asc Procedure Text (F): After obtaining clear surgical margins the patient was sent to an ASC for surgical repair.  The patient understands they will receive post-surgical care and follow-up from the ASC physician.
O-Z Flap Text: The defect edges were debeveled with a #15 scalpel blade.  Given the location of the defect, shape of the defect and the proximity to free margins an O-Z flap was deemed most appropriate.  Using a sterile surgical marker, an appropriate transposition flap was drawn incorporating the defect and placing the expected incisions within the relaxed skin tension lines where possible. The area thus outlined was incised deep to adipose tissue with a #15 scalpel blade.  The skin margins were undermined to an appropriate distance in all directions utilizing iris scissors.
Oculoplastic Surgeon Procedure Text (E): After obtaining clear surgical margins the patient was sent to oculoplastics for surgical repair.  The patient understands they will receive post-surgical care and follow-up from the referring physician's office.
Same Histology In Subsequent Stages Text: The pattern and morphology of the tumor is as described in the first stage.
Validate Note Data (See Information Below): Yes
Bilobed Flap Text: The defect edges were debeveled with a #15 scalpel blade.  Given the location of the defect and the proximity to free margins a bilobe flap was deemed most appropriate.  Using a sterile surgical marker, an appropriate bilobe flap drawn around the defect.    The area thus outlined was incised deep to adipose tissue with a #15 scalpel blade.  The skin margins were undermined to an appropriate distance in all directions utilizing iris scissors.
Quadrants Reporting?: 0
Alternatives Discussed Intro (Do Not Add Period): I discussed alternative treatments to Mohs surgery and specifically discussed the risks and benefits of
Provider Procedure Text (D): After obtaining clear surgical margins the defect was repaired by another provider.
Home Suture Removal Text: Patient was provided instructions on removing sutures and will remove their sutures at home.  If they have any questions or difficulties they will call the office.
Mercedes Flap Text: The defect edges were debeveled with a #15 scalpel blade.  Given the location of the defect, shape of the defect and the proximity to free margins a Mercedes flap was deemed most appropriate.  Using a sterile surgical marker, an appropriate advancement flap was drawn incorporating the defect and placing the expected incisions within the relaxed skin tension lines where possible. The area thus outlined was incised deep to adipose tissue with a #15 scalpel blade.  The skin margins were undermined to an appropriate distance in all directions utilizing iris scissors.
Crescentic Intermediate Repair Preamble Text (Leave Blank If You Do Not Want): Undermining was performed with blunt dissection.
Validate That Anesthesia Volume Is Not Zero (If You Leave At 0 It Will Not Render In Note): No
Suturegard Intro: Intraoperative tissue expansion was performed, utilizing the SUTUREGARD device, in order to reduce wound tension.
Xenograft Text: The defect edges were debeveled with a #15 scalpel blade.  Given the location of the defect, shape of the defect and the proximity to free margins a xenograft was deemed most appropriate.  The graft was then trimmed to fit the size of the defect.  The graft was then placed in the primary defect and oriented appropriately.
Melolabial Interpolation Flap Text: A decision was made to reconstruct the defect utilizing an interpolation axial flap and a staged reconstruction.  A telfa template was made of the defect.  This telfa template was then used to outline the melolabial interpolation flap.  The donor area for the pedicle flap was then injected with anesthesia.  The flap was excised through the skin and subcutaneous tissue down to the layer of the underlying musculature.  The pedicle flap was carefully excised within this deep plane to maintain its blood supply.  The edges of the donor site were undermined.   The donor site was closed in a primary fashion.  The pedicle was then rotated into position and sutured.  Once the tube was sutured into place, adequate blood supply was confirmed with blanching and refill.  The pedicle was then wrapped with xeroform gauze and dressed appropriately with a telfa and gauze bandage to ensure continued blood supply and protect the attached pedicle.
Crescentic Advancement Flap Text: The defect edges were debeveled with a #15 scalpel blade.  Given the location of the defect and the proximity to free margins a crescentic advancement flap was deemed most appropriate.  Using a sterile surgical marker, the appropriate advancement flap was drawn incorporating the defect and placing the expected incisions within the relaxed skin tension lines where possible.    The area thus outlined was incised deep to adipose tissue with a #15 scalpel blade.  The skin margins were undermined to an appropriate distance in all directions utilizing iris scissors.
Manual Repair Warning Statement: We plan on removing the manually selected variable below in favor of our much easier automatic structured text blocks found in the previous tab. We decided to do this to help make the flow better and give you the full power of structured data. Manual selection is never going to be ideal in our platform and I would encourage you to avoid using manual selection from this point on, especially since I will be sunsetting this feature. It is important that you do one of two things with the customized text below. First, you can save all of the text in a word file so you can have it for future reference. Second, transfer the text to the appropriate area in the Library tab. Lastly, if there is a flap or graft type which we do not have you need to let us know right away so I can add it in before the variable is hidden. No need to panic, we plan to give you roughly 6 months to make the change.
Island Pedicle Flap Text: The defect edges were debeveled with a #15 scalpel blade.  Given the location of the defect, shape of the defect and the proximity to free margins an island pedicle advancement flap was deemed most appropriate.  Using a sterile surgical marker, an appropriate advancement flap was drawn incorporating the defect, outlining the appropriate donor tissue and placing the expected incisions within the relaxed skin tension lines where possible.    The area thus outlined was incised deep to adipose tissue with a #15 scalpel blade.  The skin margins were undermined to an appropriate distance in all directions around the primary defect and laterally outward around the island pedicle utilizing iris scissors.  There was minimal undermining beneath the pedicle flap.
Rotation Flap Text: The defect edges were debeveled with a #15 scalpel blade.  Given the location of the defect, shape of the defect and the proximity to free margins a rotation flap was deemed most appropriate.  Using a sterile surgical marker, an appropriate rotation flap was drawn incorporating the defect and placing the expected incisions within the relaxed skin tension lines where possible.    The area thus outlined was incised deep to adipose tissue with a #15 scalpel blade.  The skin margins were undermined to an appropriate distance in all directions utilizing iris scissors.
Otolaryngologist Procedure Text (D): After obtaining clear surgical margins the patient was sent to otolaryngology for surgical repair.  The patient understands they will receive post-surgical care and follow-up from the referring physician's office.
Crescentic Complex Repair Preamble Text (Leave Blank If You Do Not Want): Extensive wide undermining was performed at least 2 cm in all directions.
Epidermal Sutures: 5-0 Ethilon
Epidermal Autograft Text: The defect edges were debeveled with a #15 scalpel blade.  Given the location of the defect, shape of the defect and the proximity to free margins an epidermal autograft was deemed most appropriate.  Using a sterile surgical marker, the primary defect shape was transferred to the donor site. The epidermal graft was then harvested.  The skin graft was then placed in the primary defect and oriented appropriately.
Cheiloplasty (Less Than 50%) Text: A decision was made to reconstruct the defect with a  cheiloplasty.  The defect was undermined extensively.  Additional obicularis oris muscle was excised with a 15 blade scalpel.  The defect was converted into a full thickness wedge, of less than 50% of the vertical height of the lip, to facilite a better cosmetic result.  Small vessels were then tied off with 5-0 monocyrl. The obicularis oris, superficial fascia, adipose and dermis were then reapproximated.  After the deeper layers were approximated the epidermis was reapproximated with particular care given to realign the vermilion border.
Epidermal Closure Graft Donor Site (Optional): simple interrupted
Anesthesia Type: 0.5% lidocaine and 0.5% bupivacaine in a 1:1 solution with 1:200,000 epinephrine and a 1:10 solution of 8.4% sodium bicarbonate
Repair Anesthesia Method: local infiltration
Island Pedicle Flap-Requiring Vessel Identification Text: The defect edges were debeveled with a #15 scalpel blade.  Given the location of the defect, shape of the defect and the proximity to free margins an island pedicle advancement flap was deemed most appropriate.  Using a sterile surgical marker, an appropriate advancement flap was drawn, based on the axial vessel mentioned above, incorporating the defect, outlining the appropriate donor tissue and placing the expected incisions within the relaxed skin tension lines where possible.    The area thus outlined was incised deep to adipose tissue with a #15 scalpel blade.  The skin margins were undermined to an appropriate distance in all directions around the primary defect and laterally outward around the island pedicle utilizing iris scissors.  There was minimal undermining beneath the pedicle flap.
Plastic Surgeon Procedure Text (F): After obtaining clear surgical margins the patient was sent to plastics for surgical repair.  The patient understands they will receive post-surgical care and follow-up from the referring physician's office.
Unique Flap 4 Name: Banner Flap
Keystone Flap Text: The defect edges were debeveled with a #15 scalpel blade.  Given the location of the defect, shape of the defect a keystone flap was deemed most appropriate.  Using a sterile surgical marker, an appropriate keystone flap was drawn incorporating the defect, outlining the appropriate donor tissue and placing the expected incisions within the relaxed skin tension lines where possible. The area thus outlined was incised deep to adipose tissue with a #15 scalpel blade.  The skin margins were undermined to an appropriate distance in all directions around the primary defect and laterally outward around the flap utilizing iris scissors.
Bcc Histology Text: There were numerous aggregates of basaloid cells.
Dressing (No Sutures): dry sterile dressing
Melolabial Transposition Flap Text: The defect edges were debeveled with a #15 scalpel blade.  Given the location of the defect and the proximity to free margins a melolabial flap was deemed most appropriate.  Using a sterile surgical marker, an appropriate melolabial transposition flap was drawn incorporating the defect.    The area thus outlined was incised deep to adipose tissue with a #15 scalpel blade.  The skin margins were undermined to an appropriate distance in all directions utilizing iris scissors.
Consent (Ear)/Introductory Paragraph: The rationale for Mohs was explained to the patient and consent was obtained. The risks, benefits and alternatives to therapy were discussed in detail. Specifically, the risks of ear deformity, infection, scarring, bleeding, prolonged wound healing, incomplete removal, allergy to anesthesia, nerve injury and recurrence were addressed. Prior to the procedure, the treatment site was clearly identified and confirmed by the patient. All components of Universal Protocol/PAUSE Rule completed.
Previous Accession (Optional): [id number]
Split-Thickness Skin Graft Text: The defect edges were debeveled with a #15 scalpel blade.  Given the location of the defect, shape of the defect and the proximity to free margins a split thickness skin graft was deemed most appropriate.  Using a sterile surgical marker, the primary defect shape was transferred to the donor site. The split thickness graft was then harvested.  The skin graft was then placed in the primary defect and oriented appropriately.
Mauc Instructions: By selecting yes to the question below the MAUC number will be added into the note.  This will be calculated automatically based on the diagnosis chosen, the size entered, the body zone selected (H,M,L) and the specific indications you chose. You will also have the option to override the Mohs AUC if you disagree with the automatically calculated number and this option is found in the Case Summary tab.
Information: Selecting Yes will display possible errors in your note based on the variables you have selected. This validation is only offered as a suggestion for you. PLEASE NOTE THAT THE VALIDATION TEXT WILL BE REMOVED WHEN YOU FINALIZE YOUR NOTE. IF YOU WANT TO FAX A PRELIMINARY NOTE YOU WILL NEED TO TOGGLE THIS TO 'NO' IF YOU DO NOT WANT IT IN YOUR FAXED NOTE.
Complex Repair And Graft Additional Text (Will Appearing After The Standard Complex Repair Text): The complex repair was not sufficient to completely close the primary defect. The remaining additional defect was repaired with the graft mentioned below.
S Plasty Text: Given the location and shape of the defect, and the orientation of relaxed skin tension lines, an S-plasty was deemed most appropriate for repair.  Using a sterile surgical marker, the appropriate outline of the S-plasty was drawn, incorporating the defect and placing the expected incisions within the relaxed skin tension lines where possible.  The area thus outlined was incised deep to adipose tissue with a #15 scalpel blade.  The skin margins were undermined to an appropriate distance in all directions utilizing iris scissors. The skin flaps were advanced over the defect.  The opposing margins were then approximated with interrupted buried subcutaneous sutures.
Helical Rim Advancement Flap Text: The defect edges were debeveled with a #15 blade scalpel.  Given the location of the defect and the proximity to free margins (helical rim) a double helical rim advancement flap was deemed most appropriate.  Using a sterile surgical marker, the appropriate advancement flaps were drawn incorporating the defect and placing the expected incisions between the helical rim and antihelix where possible.  The area thus outlined was incised through and through with a #15 scalpel blade.  With a skin hook and iris scissors, the flaps were gently and sharply undermined and freed up.
Mohs Method Verbiage: An incision at a 45 degree angle following the standard Mohs approach was done and the specimen was harvested as a microscopic controlled layer.
Consent (Temporal Branch)/Introductory Paragraph: The rationale for Mohs was explained to the patient and consent was obtained. The risks, benefits and alternatives to therapy were discussed in detail. Specifically, the risks of damage to the temporal branch of the facial nerve, infection, scarring, bleeding, prolonged wound healing, incomplete removal, allergy to anesthesia, and recurrence were addressed. Prior to the procedure, the treatment site was clearly identified and confirmed by the patient. All components of Universal Protocol/PAUSE Rule completed.
Number Of Stages: 1
Non-Graft Cartilage Fenestration Text: The cartilage was fenestrated with a 2mm punch biopsy to help facilitate healing.
Partial Purse String (Intermediate) Text: Given the location of the defect and the characteristics of the surrounding skin an intermediate purse string closure was deemed most appropriate.  Undermining was performed circumfirentially around the surgical defect.  A purse string suture was then placed and tightened. Wound tension only allowed a partial closure of the circular defect.
Z Plasty Text: The lesion was extirpated to the level of the fat with a #15 scalpel blade.  Given the location of the defect, shape of the defect and the proximity to free margins a Z-plasty was deemed most appropriate for repair.  Using a sterile surgical marker, the appropriate transposition arms of the Z-plasty were drawn incorporating the defect and placing the expected incisions within the relaxed skin tension lines where possible.    The area thus outlined was incised deep to adipose tissue with a #15 scalpel blade.  The skin margins were undermined to an appropriate distance in all directions utilizing iris scissors.  The opposing transposition arms were then transposed into place in opposite direction and anchored with interrupted buried subcutaneous sutures.
Double O-Z Flap Text: The defect edges were debeveled with a #15 scalpel blade.  Given the location of the defect, shape of the defect and the proximity to free margins a Double O-Z flap was deemed most appropriate.  Using a sterile surgical marker, an appropriate transposition flap was drawn incorporating the defect and placing the expected incisions within the relaxed skin tension lines where possible. The area thus outlined was incised deep to adipose tissue with a #15 scalpel blade.  The skin margins were undermined to an appropriate distance in all directions utilizing iris scissors.
No Residual Tumor Seen Histology Text: There were no malignant cells seen in the sections examined.
Area H Indication Text: Tumors in this location are included in Area H (eyelids, eyebrows, nose, lips, chin, ear, pre-auricular, post-auricular, temple, genitalia, hands, feet, ankles and areola).  Tissue conservation is critical in these anatomic locations.
Consent 1/Introductory Paragraph: The rationale for Mohs was explained to the patient and consent was obtained. The risks, benefits and alternatives to therapy were discussed in detail. Specifically, the risks of infection, scarring, bleeding, prolonged wound healing, incomplete removal, allergy to anesthesia, nerve injury and recurrence were addressed. Prior to the procedure, the treatment site was clearly identified and confirmed by the patient. All components of Universal Protocol/PAUSE Rule completed.
Cheek Interpolation Flap Text: A decision was made to reconstruct the defect utilizing an interpolation axial flap and a staged reconstruction.  A telfa template was made of the defect.  This telfa template was then used to outline the Cheek Interpolation flap.  The donor area for the pedicle flap was then injected with anesthesia.  The flap was excised through the skin and subcutaneous tissue down to the layer of the underlying musculature.  The interpolation flap was carefully excised within this deep plane to maintain its blood supply.  The edges of the donor site were undermined.   The donor site was closed in a primary fashion.  The pedicle was then rotated into position and sutured.  Once the tube was sutured into place, adequate blood supply was confirmed with blanching and refill.  The pedicle was then wrapped with xeroform gauze and dressed appropriately with a telfa and gauze bandage to ensure continued blood supply and protect the attached pedicle.
Purse String (Simple) Text: Given the location of the defect and the characteristics of the surrounding skin a purse string closure was deemed most appropriate.  Undermining was performed circumfirentially around the surgical defect.  A purse string suture was then placed and tightened.
Surgeon Performing Repair (Optional): Melly
Suturegard Body: The suture ends were repeatedly re-tightened and re-clamped to achieve the desired tissue expansion.
Bilobed Transposition Flap Text: The defect edges were debeveled with a #15 scalpel blade.  Given the location of the defect and the proximity to free margins a bilobed transposition flap was deemed most appropriate.  Using a sterile surgical marker, an appropriate bilobe flap drawn around the defect.    The area thus outlined was incised deep to adipose tissue with a #15 scalpel blade.  The skin margins were undermined to an appropriate distance in all directions utilizing iris scissors.
Closure 2 Information: This tab is for additional flaps and grafts, including complex repair and grafts and complex repair and flaps. You can also specify a different location for the additional defect, if the location is the same you do not need to select a new one. We will insert the automated text for the repair you select below just as we do for solitary flaps and grafts. Please note that at this time if you select a location with a different insurance zone you will need to override the ICD10 and CPT if appropriate.
Additional Anesthesia Volume In Cc: 6
A-T Advancement Flap Text: The defect edges were debeveled with a #15 scalpel blade.  Given the location of the defect, shape of the defect and the proximity to free margins an A-T advancement flap was deemed most appropriate.  Using a sterile surgical marker, an appropriate advancement flap was drawn incorporating the defect and placing the expected incisions within the relaxed skin tension lines where possible.    The area thus outlined was incised deep to adipose tissue with a #15 scalpel blade.  The skin margins were undermined to an appropriate distance in all directions utilizing iris scissors.
Modified Advancement Flap Text: The defect edges were debeveled with a #15 scalpel blade.  Given the location of the defect, shape of the defect and the proximity to free margins a modified advancement flap was deemed most appropriate.  Using a sterile surgical marker, an appropriate advancement flap was drawn incorporating the defect and placing the expected incisions within the relaxed skin tension lines where possible.    The area thus outlined was incised deep to adipose tissue with a #15 scalpel blade.  The skin margins were undermined to an appropriate distance in all directions utilizing iris scissors.
Stage 8: Additional Anesthesia Type: 1% lidocaine with epinephrine
Mastoid Interpolation Flap Text: A decision was made to reconstruct the defect utilizing an interpolation axial flap and a staged reconstruction.  A telfa template was made of the defect.  This telfa template was then used to outline the mastoid interpolation flap.  The donor area for the pedicle flap was then injected with anesthesia.  The flap was excised through the skin and subcutaneous tissue down to the layer of the underlying musculature.  The pedicle flap was carefully excised within this deep plane to maintain its blood supply.  The edges of the donor site were undermined.   The donor site was closed in a primary fashion.  The pedicle was then rotated into position and sutured.  Once the tube was sutured into place, adequate blood supply was confirmed with blanching and refill.  The pedicle was then wrapped with xeroform gauze and dressed appropriately with a telfa and gauze bandage to ensure continued blood supply and protect the attached pedicle.
Island Pedicle Flap With Canthal Suspension Text: The defect edges were debeveled with a #15 scalpel blade.  Given the location of the defect, shape of the defect and the proximity to free margins an island pedicle advancement flap was deemed most appropriate.  Using a sterile surgical marker, an appropriate advancement flap was drawn incorporating the defect, outlining the appropriate donor tissue and placing the expected incisions within the relaxed skin tension lines where possible. The area thus outlined was incised deep to adipose tissue with a #15 scalpel blade.  The skin margins were undermined to an appropriate distance in all directions around the primary defect and laterally outward around the island pedicle utilizing iris scissors.  There was minimal undermining beneath the pedicle flap. A suspension suture was placed in the canthal tendon to prevent tension and prevent ectropion.
Dermal Autograft Text: The defect edges were debeveled with a #15 scalpel blade.  Given the location of the defect, shape of the defect and the proximity to free margins a dermal autograft was deemed most appropriate.  Using a sterile surgical marker, the primary defect shape was transferred to the donor site. The area thus outlined was incised deep to adipose tissue with a #15 scalpel blade.  The harvested graft was then trimmed of adipose and epidermal tissue until only dermis was left.  The skin graft was then placed in the primary defect and oriented appropriately.
Advancement Flap (Single) Text: The defect edges were debeveled with a #15 scalpel blade.  Given the location of the defect and the proximity to free margins a single advancement flap was deemed most appropriate.  Using a sterile surgical marker, an appropriate advancement flap was drawn incorporating the defect and placing the expected incisions within the relaxed skin tension lines where possible.    The area thus outlined was incised deep to adipose tissue with a #15 scalpel blade.  The skin margins were undermined to an appropriate distance in all directions utilizing iris scissors.
Spiral Flap Text: The defect edges were debeveled with a #15 scalpel blade.  Given the location of the defect, shape of the defect and the proximity to free margins a spiral flap was deemed most appropriate.  Using a sterile surgical marker, an appropriate rotation flap was drawn incorporating the defect and placing the expected incisions within the relaxed skin tension lines where possible. The area thus outlined was incised deep to adipose tissue with a #15 scalpel blade.  The skin margins were undermined to an appropriate distance in all directions utilizing iris scissors.
Unique Flap 2 Text: A decision was made to reconstruct the defect utilizing a Peng Flap (Bilateral Advancement Rotation Flap). Given the location of the defect and the proximity to free margins, this flap was deemed most appropriate.  Using a sterile surgical marker, the appropriate rotation flaps were drawn incorporating the defect and placing the expected incisions within the relaxed skin tension lines where possible.    The area thus outlined was incised deep to adipose tissue with a #15 scalpel blade.  The skin margins were undermined to an appropriate distance in all directions utilizing iris scissors.
Cheiloplasty (Complex) Text: A decision was made to reconstruct the defect with a  cheiloplasty.  The defect was undermined extensively.  Additional obicularis oris muscle was excised with a 15 blade scalpel.  The defect was converted into a full thickness wedge to facilite a better cosmetic result.  Small vessels were then tied off with 5-0 monocyrl. The obicularis oris, superficial fascia, adipose and dermis were then reapproximated.  After the deeper layers were approximated the epidermis was reapproximated with particular care given to realign the vermilion border.
Stage 3: Additional Anesthesia Type: 1% lidocaine with 1:100,000 epinephrine and 408mcg clindamycin/ml and a 1:10 solution of 8.4% sodium bicarbonate
Unique Flap 1 Text: A decision was made to reconstruct the defect utilizing a myocutaneous Island pedicle Flap based on the levator labii superioris muscle.  A telfa template was made of the defect.  This telfa template was then used to outline the myocutaneous flap, based along the meilolabial fold.  The donor area for the pedicle flap was then injected with anesthesia.  The flap was excised through the skin and subcutaneous tissue down to the layer of the underlying musculature.  The myocutaneous flap was carefully excised within this deep plane to maintain its blood supply. Based on the muscle. The edges of the donor site were undermined.   The donor site was closed in a primary fashion to the point of transposition.  The pedicle was then transposed into position and sutured.  Once the flap was sutured into place, adequate blood supply was confirmed with blanching and refill.
Ear Wedge Repair Text: A wedge excision was completed by carrying down an excision through the full thickness of the ear and cartilage with an inward facing Burow's triangle. The wound was then closed in a layered fashion.
Bcc Infiltrative Histology Text: There were numerous aggregates of basaloid cells demonstrating an infiltrative pattern.
Consent (Nose)/Introductory Paragraph: The rationale for Mohs was explained to the patient and consent was obtained. The risks, benefits and alternatives to therapy were discussed in detail. Specifically, the risks of nasal deformity, changes in the flow of air through the nose, infection, scarring, bleeding, prolonged wound healing, incomplete removal, allergy to anesthesia, nerve injury and recurrence were addressed. Prior to the procedure, the treatment site was clearly identified and confirmed by the patient. All components of Universal Protocol/PAUSE Rule completed.
Subsequent Stages Histo Method Verbiage: Using a similar technique to that described above, a thin layer of tissue was removed from all areas where tumor was visible on the previous stage.  The tissue was again oriented, mapped, dyed, and processed as above.
O-T Plasty Text: The defect edges were debeveled with a #15 scalpel blade.  Given the location of the defect, shape of the defect and the proximity to free margins an O-T plasty was deemed most appropriate.  Using a sterile surgical marker, an appropriate O-T plasty was drawn incorporating the defect and placing the expected incisions within the relaxed skin tension lines where possible.    The area thus outlined was incised deep to adipose tissue with a #15 scalpel blade.  The skin margins were undermined to an appropriate distance in all directions utilizing iris scissors.
Rhombic Flap Text: The defect edges were debeveled with a #15 scalpel blade.  Given the location of the defect and the proximity to free margins a rhombic flap was deemed most appropriate.  Using a sterile surgical marker, an appropriate rhombic flap was drawn incorporating the defect.    The area thus outlined was incised deep to adipose tissue with a #15 scalpel blade.  The skin margins were undermined to an appropriate distance in all directions utilizing iris scissors.
Unique Flap 1 Name: Myocutaneous Island pedicle Flap
Epidermal Closure: running cuticular
Retention Suture Bite Size: 3 mm
Cartilage Graft Text: The defect edges were debeveled with a #15 scalpel blade.  Given the location of the defect, shape of the defect, the fact the defect involved a full thickness cartilage defect a cartilage graft was deemed most appropriate.  An appropriate donor site was identified, cleansed, and anesthetized. The cartilage graft was then harvested and transferred to the recipient site, oriented appropriately and then sutured into place.  The secondary defect was then repaired using a primary closure.
Closure 3 Information: This tab is for additional flaps and grafts above and beyond our usual structured repairs.  Please note if you enter information here it will not currently bill and you will need to add the billing information manually.
Consent (Marginal Mandibular)/Introductory Paragraph: The rationale for Mohs was explained to the patient and consent was obtained. The risks, benefits and alternatives to therapy were discussed in detail. Specifically, the risks of damage to the marginal mandibular branch of the facial nerve, infection, scarring, bleeding, prolonged wound healing, incomplete removal, allergy to anesthesia, and recurrence were addressed. Prior to the procedure, the treatment site was clearly identified and confirmed by the patient. All components of Universal Protocol/PAUSE Rule completed.
Surgeon/Pathologist Verbiage (Will Incorporate Name Of Surgeon From Intro If Not Blank): operated in two distinct and integrated capacities as the surgeon and pathologist.
V-Y Plasty Text: The defect edges were debeveled with a #15 scalpel blade.  Given the location of the defect, shape of the defect and the proximity to free margins an V-Y advancement flap was deemed most appropriate.  Using a sterile surgical marker, an appropriate advancement flap was drawn incorporating the defect and placing the expected incisions within the relaxed skin tension lines where possible.    The area thus outlined was incised deep to adipose tissue with a #15 scalpel blade.  The skin margins were undermined to an appropriate distance in all directions utilizing iris scissors.
Graft Cartilage Fenestration Text: The cartilage was fenestrated with a 2mm punch biopsy to help facilitate graft survival and healing.
Bilateral Helical Rim Advancement Flap Text: The defect edges were debeveled with a #15 blade scalpel.  Given the location of the defect and the proximity to free margins (helical rim) a bilateral helical rim advancement flap was deemed most appropriate.  Using a sterile surgical marker, the appropriate advancement flaps were drawn incorporating the defect and placing the expected incisions between the helical rim and antihelix where possible.  The area thus outlined was incised through and through with a #15 scalpel blade.  With a skin hook and iris scissors, the flaps were gently and sharply undermined and freed up.
Localized Dermabrasion With Wire Brush Text: The patient was draped in routine manner.  Localized dermabrasion using 3 x 17 mm wire brush was performed in routine manner to papillary dermis. This spot dermabrasion is being performed to complete skin cancer reconstruction. It also will eliminate the other sun damaged precancerous cells that are known to be part of the regional effect of a lifetime's worth of sun exposure. This localized dermabrasion is therapeutic and should not be considered cosmetic in any regard.
Graft Basting Suture (Optional): 5-0 Fast Absorbing Gut
Wound Care: Aquaphor
V-Y Flap Text: The defect edges were debeveled with a #15 scalpel blade.  Given the location of the defect, shape of the defect and the proximity to free margins a V-Y flap was deemed most appropriate.  Using a sterile surgical marker, an appropriate advancement flap was drawn incorporating the defect and placing the expected incisions within the relaxed skin tension lines where possible.    The area thus outlined was incised deep to adipose tissue with a #15 scalpel blade.  The skin margins were undermined to an appropriate distance in all directions utilizing iris scissors.
Purse String (Intermediate) Text: Given the location of the defect and the characteristics of the surrounding skin a purse string intermediate closure was deemed most appropriate.  Undermining was performed circumfirentially around the surgical defect.  A purse string suture was then placed and tightened.
Donor Site Anesthesia Type: same as repair anesthesia
Postop Diagnosis: same
Deep Sutures: 5-0 Vicryl
Cheek-To-Nose Interpolation Flap Text: A decision was made to reconstruct the defect utilizing an interpolation axial flap and a staged reconstruction.  A telfa template was made of the defect.  This telfa template was then used to outline the Cheek-To-Nose Interpolation flap.  The donor area for the pedicle flap was then injected with anesthesia.  The flap was excised through the skin and subcutaneous tissue down to the layer of the underlying musculature.  The interpolation flap was carefully excised within this deep plane to maintain its blood supply.  The edges of the donor site were undermined.   The donor site was closed in a primary fashion.  The pedicle was then rotated into position and sutured.  Once the tube was sutured into place, adequate blood supply was confirmed with blanching and refill.  The pedicle was then wrapped with xeroform gauze and dressed appropriately with a telfa and gauze bandage to ensure continued blood supply and protect the attached pedicle.
Hemostasis: Electrocautery
O-T Advancement Flap Text: The defect edges were debeveled with a #15 scalpel blade.  Given the location of the defect, shape of the defect and the proximity to free margins an O-T advancement flap was deemed most appropriate.  Using a sterile surgical marker, an appropriate advancement flap was drawn incorporating the defect and placing the expected incisions within the relaxed skin tension lines where possible.    The area thus outlined was incised deep to adipose tissue with a #15 scalpel blade.  The skin margins were undermined to an appropriate distance in all directions utilizing iris scissors.
Trilobed Flap Text: The defect edges were debeveled with a #15 scalpel blade.  Given the location of the defect and the proximity to free margins a trilobed flap was deemed most appropriate.  Using a sterile surgical marker, an appropriate trilobed flap drawn around the defect.    The area thus outlined was incised deep to adipose tissue with a #15 scalpel blade.  The skin margins were undermined to an appropriate distance in all directions utilizing iris scissors.
Mucosal Advancement Flap Text: Given the location of the defect, shape of the defect and the proximity to free margins a mucosal advancement flap was deemed most appropriate. Incisions were made with a 15 blade scalpel in the appropriate fashion along the cutaneous vermilion border and the mucosal lip. The remaining actinically damaged mucosal tissue was excised.  The mucosal advancement flap was then elevated to the gingival sulcus with care taken to preserve the neurovascular structures and advanced into the primary defect. Care was taken to ensure that precise realignment of the vermilion border was achieved.
M-Plasty Complex Repair Preamble Text (Leave Blank If You Do Not Want): Extensive wide undermining was performed.
Graft Donor Site Bandage (Optional-Leave Blank If You Don't Want In Note): Aquaphor and telefa placed on wound. Pressure dressing applied to donor site
Posterior Auricular Interpolation Flap Text: A decision was made to reconstruct the defect utilizing an interpolation axial flap and a staged reconstruction.  A telfa template was made of the defect.  This telfa template was then used to outline the posterior auricular interpolation flap.  The donor area for the pedicle flap was then injected with anesthesia.  The flap was excised through the skin and subcutaneous tissue down to the layer of the underlying musculature.  The pedicle flap was carefully excised within this deep plane to maintain its blood supply.  The edges of the donor site were undermined.   The donor site was closed in a primary fashion.  The pedicle was then rotated into position and sutured.  Once the tube was sutured into place, adequate blood supply was confirmed with blanching and refill.  The pedicle was then wrapped with xeroform gauze and dressed appropriately with a telfa and gauze bandage to ensure continued blood supply and protect the attached pedicle.
Skin Substitute Text: The defect edges were debeveled with a #15 scalpel blade.  Given the location of the defect, shape of the defect and the proximity to free margins a skin substitute graft was deemed most appropriate.  The graft material was trimmed to fit the size of the defect. The graft was then placed in the primary defect and oriented appropriately.
Advancement Flap (Double) Text: The defect edges were debeveled with a #15 scalpel blade.  Given the location of the defect and the proximity to free margins a double advancement flap was deemed most appropriate.  Using a sterile surgical marker, the appropriate advancement flaps were drawn incorporating the defect and placing the expected incisions within the relaxed skin tension lines where possible.    The area thus outlined was incised deep to adipose tissue with a #15 scalpel blade.  The skin margins were undermined to an appropriate distance in all directions utilizing iris scissors.
Unique Flap 3 Text: The defect edges were debeveled with a #15 scalpel blade.  Given the location of the defect, shape of the defect and the proximity to free margins a Mercedes (double advancement flap) was deemed most appropriate.  Using a sterile surgical marker, the appropriate transposition flaps were drawn incorporating the defect and placing the expected incisions within the relaxed skin tension lines where possible.    The area thus outlined was incised deep to adipose tissue with a #15 scalpel blade.  The skin margins were undermined to an appropriate distance in all directions utilizing iris scissors.  Hemostasis was achieved with electrocautery.  The flaps were then advanced into the defect and anchored with interrupted buried subcutaneous sutures.
Alar Island Pedicle Flap Text: The defect edges were debeveled with a #15 scalpel blade.  Given the location of the defect, shape of the defect and the proximity to the alar rim an island pedicle advancement flap was deemed most appropriate.  Using a sterile surgical marker, an appropriate advancement flap was drawn incorporating the defect, outlining the appropriate donor tissue and placing the expected incisions within the nasal ala running parallel to the alar rim. The area thus outlined was incised with a #15 scalpel blade.  The skin margins were undermined minimally to an appropriate distance in all directions around the primary defect and laterally outward around the island pedicle utilizing iris scissors.  There was minimal undermining beneath the pedicle flap.
Consent (Scalp)/Introductory Paragraph: The rationale for Mohs was explained to the patient and consent was obtained. The risks, benefits and alternatives to therapy were discussed in detail. Specifically, the risks of changes in hair growth pattern secondary to repair, infection, scarring, bleeding, prolonged wound healing, incomplete removal, allergy to anesthesia, nerve injury and recurrence were addressed. Prior to the procedure, the treatment site was clearly identified and confirmed by the patient. All components of Universal Protocol/PAUSE Rule completed.
Star Wedge Flap Text: The defect edges were debeveled with a #15 scalpel blade.  Given the location of the defect, shape of the defect and the proximity to free margins a star wedge flap was deemed most appropriate.  Using a sterile surgical marker, an appropriate rotation flap was drawn incorporating the defect and placing the expected incisions within the relaxed skin tension lines where possible. The area thus outlined was incised deep to adipose tissue with a #15 scalpel blade.  The skin margins were undermined to an appropriate distance in all directions utilizing iris scissors.
Suture Removal: 7 days
Transposition Flap Text: The defect edges were debeveled with a #15 scalpel blade.  Given the location of the defect and the proximity to free margins a transposition flap was deemed most appropriate.  Using a sterile surgical marker, an appropriate transposition flap was drawn incorporating the defect.    The area thus outlined was incised deep to adipose tissue with a #15 scalpel blade.  The skin margins were undermined to an appropriate distance in all directions utilizing iris scissors.
Mohs Rapid Report Verbiage: The area of clinically evident tumor was marked with skin marking ink and appropriately hatched.  The initial incision was made following the Mohs approach through the skin.  The specimen was taken to the lab, divided into the necessary number of pieces, chromacoded and processed according to the Mohs protocol.  This was repeated in successive stages until a tumor free defect was achieved.
Consent (Lip)/Introductory Paragraph: The rationale for Mohs was explained to the patient and consent was obtained. The risks, benefits and alternatives to therapy were discussed in detail. Specifically, the risks of lip deformity, changes in the oral aperture, infection, scarring, bleeding, prolonged wound healing, incomplete removal, allergy to anesthesia, nerve injury and recurrence were addressed. Prior to the procedure, the treatment site was clearly identified and confirmed by the patient. All components of Universal Protocol/PAUSE Rule completed.
Full Thickness Lip Wedge Repair (Flap) Text: Given the location of the defect and the proximity to free margins a full thickness wedge repair was deemed most appropriate.  Using a sterile surgical marker, the appropriate repair was drawn incorporating the defect and placing the expected incisions perpendicular to the vermilion border.  The vermilion border was also meticulously outlined to ensure appropriate reapproximation during the repair.  The area thus outlined was incised through and through with a #15 scalpel blade.  The muscularis and dermis were reaproximated with deep sutures following hemostasis. Care was taken to realign the vermilion border before proceeding with the superficial closure.  Once the vermilion was realigned the superfical and mucosal closure was finished.
O-Z Plasty Text: The defect edges were debeveled with a #15 scalpel blade.  Given the location of the defect, shape of the defect and the proximity to free margins an O-Z plasty (double transposition flap) was deemed most appropriate.  Using a sterile surgical marker, the appropriate transposition flaps were drawn incorporating the defect and placing the expected incisions within the relaxed skin tension lines where possible.    The area thus outlined was incised deep to adipose tissue with a #15 scalpel blade.  The skin margins were undermined to an appropriate distance in all directions utilizing iris scissors.  Hemostasis was achieved with electrocautery.  The flaps were then transposed into place, one clockwise and the other counterclockwise, and anchored with interrupted buried subcutaneous sutures.
Unique Flap 2 Name: Peng Flap
Wound Care (No Sutures): Petrolatum
Rhomboid Transposition Flap Text: The defect edges were debeveled with a #15 scalpel blade.  Given the location of the defect and the proximity to free margins a rhomboid transposition flap was deemed most appropriate.  Using a sterile surgical marker, an appropriate rhomboid flap was drawn incorporating the defect.    The area thus outlined was incised deep to adipose tissue with a #15 scalpel blade.  The skin margins were undermined to an appropriate distance in all directions utilizing iris scissors.
Consent (Spinal Accessory)/Introductory Paragraph: The rationale for Mohs was explained to the patient and consent was obtained. The risks, benefits and alternatives to therapy were discussed in detail. Specifically, the risks of damage to the spinal accessory nerve, infection, scarring, bleeding, prolonged wound healing, incomplete removal, allergy to anesthesia, and recurrence were addressed. Prior to the procedure, the treatment site was clearly identified and confirmed by the patient. All components of Universal Protocol/PAUSE Rule completed.
Mohs Histo Method Verbiage: Each section was then chromacoded and processed in the Mohs lab using the Mohs protocol and submitted for frozen section.
Composite Graft Text: The defect edges were debeveled with a #15 scalpel blade.  Given the location of the defect, shape of the defect, the proximity to free margins and the fact the defect was full thickness a composite graft was deemed most appropriate.  The defect was outline and then transferred to the donor site.  A full thickness graft was then excised from the donor site. The graft was then placed in the primary defect, oriented appropriately and then sutured into place.  The secondary defect was then repaired using a primary closure.
Secondary Intention Text (Leave Blank If You Do Not Want): The defect will heal with secondary intention.
Tarsorrhaphy Text: A tarsorrhaphy was performed using Frost sutures.
H Plasty Text: Given the location of the defect, shape of the defect and the proximity to free margins a H-plasty was deemed most appropriate for repair.  Using a sterile surgical marker, the appropriate advancement arms of the H-plasty were drawn incorporating the defect and placing the expected incisions within the relaxed skin tension lines where possible. The area thus outlined was incised deep to adipose tissue with a #15 scalpel blade. The skin margins were undermined to an appropriate distance in all directions utilizing iris scissors.  The opposing advancement arms were then advanced into place in opposite direction and anchored with interrupted buried subcutaneous sutures.
Inflammation Suggestive Of Cancer Camouflage Histology Text: There was a dense lymphocytic infiltrate which prevented adequate histologic evaluation of adjacent structures.
Ear Star Wedge Flap Text: The defect edges were debeveled with a #15 blade scalpel.  Given the location of the defect and the proximity to free margins (helical rim) an ear star wedge flap was deemed most appropriate.  Using a sterile surgical marker, the appropriate flap was drawn incorporating the defect and placing the expected incisions between the helical rim and antihelix where possible.  The area thus outlined was incised through and through with a #15 scalpel blade.
Area M Indication Text: Tumors in this location are included in Area M (cheek, forehead, scalp, neck, jawline and pretibial skin).  Mohs surgery is indicated for tumors in these anatomic locations.
Consent 2/Introductory Paragraph: Mohs surgery was explained to the patient and consent was obtained. The risks, benefits and alternatives to therapy were discussed in detail. Specifically, the risks of infection, scarring, bleeding, prolonged wound healing, incomplete removal, allergy to anesthesia, nerve injury and recurrence were addressed. Prior to the procedure, the treatment site was clearly identified and confirmed by the patient. All components of Universal Protocol/PAUSE Rule completed.
Partial Purse String (Simple) Text: Given the location of the defect and the characteristics of the surrounding skin a simple purse string closure was deemed most appropriate.  Undermining was performed circumfirentially around the surgical defect.  A purse string suture was then placed and tightened. Wound tension only allowed a partial closure of the circular defect.
Banner Transposition Flap Text: The defect edges were debeveled with a #15 scalpel blade.  Given the location of the defect and the proximity to free margins a Banner transposition flap was deemed most appropriate.  Using a sterile surgical marker, an appropriate flap drawn around the defect. The area thus outlined was incised deep to adipose tissue with a #15 scalpel blade.  The skin margins were undermined to an appropriate distance in all directions utilizing iris scissors.
O-L Flap Text: The defect edges were debeveled with a #15 scalpel blade.  Given the location of the defect, shape of the defect and the proximity to free margins an O-L flap was deemed most appropriate.  Using a sterile surgical marker, an appropriate advancement flap was drawn incorporating the defect and placing the expected incisions within the relaxed skin tension lines where possible.    The area thus outlined was incised deep to adipose tissue with a #15 scalpel blade.  The skin margins were undermined to an appropriate distance in all directions utilizing iris scissors.
Advancement-Rotation Flap Text: The defect edges were debeveled with a #15 scalpel blade.  Given the location of the defect, shape of the defect and the proximity to free margins an advancement-rotation flap was deemed most appropriate.  Using a sterile surgical marker, an appropriate flap was drawn incorporating the defect and placing the expected incisions within the relaxed skin tension lines where possible. The area thus outlined was incised deep to adipose tissue with a #15 scalpel blade.  The skin margins were undermined to an appropriate distance in all directions utilizing iris scissors.
Interpolation Flap Text: A decision was made to reconstruct the defect utilizing an interpolation axial flap and a staged reconstruction.  A telfa template was made of the defect.  This telfa template was then used to outline the interpolation flap.  The donor area for the pedicle flap was then injected with anesthesia.  The flap was excised through the skin and subcutaneous tissue down to the layer of the underlying musculature.  The interpolation flap was carefully excised within this deep plane to maintain its blood supply.  The edges of the donor site were undermined.   The donor site was closed in a primary fashion.  The pedicle was then rotated into position and sutured.  Once the tube was sutured into place, adequate blood supply was confirmed with blanching and refill.  The pedicle was then wrapped with xeroform gauze and dressed appropriately with a telfa and gauze bandage to ensure continued blood supply and protect the attached pedicle.
Medical Necessity Statement: Based on my medical judgement, Mohs surgery is the most appropriate treatment for this cancer compared to other treatments.
Unna Boot Text: An Unna boot was placed to help immobilize the limb and facilitate more rapid healing.
Dorsal Nasal Flap Text: The defect edges were debeveled with a #15 scalpel blade.  Given the location of the defect and the proximity to free margins a dorsal nasal flap,based upon the glabellar folds, was deemed most appropriate.  Using a sterile surgical marker, an appropriate dorsal nasal flap was drawn around the defect.    The area thus outlined was incised deep to adipose tissue with a #15 scalpel blade.  The skin margins were undermined to an appropriate distance in all directions utilizing iris scissors.
Tissue Cultured Epidermal Autograft Text: The defect edges were debeveled with a #15 scalpel blade.  Given the location of the defect, shape of the defect and the proximity to free margins a tissue cultured epidermal autograft was deemed most appropriate.  The graft was then trimmed to fit the size of the defect.  The graft was then placed in the primary defect and oriented appropriately.
Burow's Advancement Flap Text: The defect edges were debeveled with a #15 scalpel blade.  Given the location of the defect and the proximity to free margins a Burow's advancement flap was deemed most appropriate.  Using a sterile surgical marker, the appropriate advancement flap was drawn incorporating the defect and placing the expected incisions within the relaxed skin tension lines where possible.    The area thus outlined was incised deep to adipose tissue with a #15 scalpel blade.  The skin margins were undermined to an appropriate distance in all directions utilizing iris scissors.
Unique Flap 4 Text: The defect edges were debeveled with a #15 scalpel blade.  Given the location of the defect and the proximity to free margins a Banner transposition flap was deemed most appropriate.  Using a sterile surgical marker, an appropriate Banner transposition flap was drawn incorporating the defect.    The area thus outlined was incised deep to adipose tissue with a #15 scalpel blade.  The skin margins were undermined to an appropriate distance in all directions utilizing iris scissors.
Hatchet Flap Text: The defect edges were debeveled with a #15 scalpel blade.  Given the location of the defect, shape of the defect and the proximity to free margins a hatchet flap based from the glabella was deemed most appropriate.  Using a sterile surgical marker, an appropriate glabellar hatchet flap was drawn incorporating the defect and placing the expected incisions within the relaxed skin tension lines where possible.    The area thus outlined was incised deep to adipose tissue with a #15 scalpel blade.  The skin margins were undermined to an appropriate distance in all directions utilizing iris scissors.
Paramedian Forehead Flap Text: A decision was made to reconstruct the defect utilizing an interpolation axial flap and a staged reconstruction.  A telfa template was made of the defect.  This telfa template was then used to outline the paramedian forehead pedicle flap.  The donor area for the pedicle flap was then injected with anesthesia.  The flap was excised through the skin and subcutaneous tissue down to the layer of the underlying musculature.  The pedicle flap was carefully excised within this deep plane to maintain its blood supply.  The edges of the donor site were undermined.   The donor site was closed in a primary fashion.  The pedicle was then rotated into position and sutured.  Once the tube was sutured into place, adequate blood supply was confirmed with blanching and refill.  The pedicle was then wrapped with xeroform gauze and dressed appropriately with a telfa and gauze bandage to ensure continued blood supply and protect the attached pedicle.
Detail Level: Detailed
Consent Type: Consent 1 (Standard)
Double Island Pedicle Flap Text: The defect edges were debeveled with a #15 scalpel blade.  Given the location of the defect, shape of the defect and the proximity to free margins a double island pedicle advancement flap was deemed most appropriate.  Using a sterile surgical marker, an appropriate advancement flap was drawn incorporating the defect, outlining the appropriate donor tissue and placing the expected incisions within the relaxed skin tension lines where possible.    The area thus outlined was incised deep to adipose tissue with a #15 scalpel blade.  The skin margins were undermined to an appropriate distance in all directions around the primary defect and laterally outward around the island pedicle utilizing iris scissors.  There was minimal undermining beneath the pedicle flap.
Referring Physician (Optional): Amy Schoening
Graft Donor Site Epidermal Sutures (Optional): 5-0 Ethibond
Muscle Hinge Flap Text: The defect edges were debeveled with a #15 scalpel blade.  Given the size, depth and location of the defect and the proximity to free margins a muscle hinge flap was deemed most appropriate.  Using a sterile surgical marker, an appropriate hinge flap was drawn incorporating the defect. The area thus outlined was incised with a #15 scalpel blade.  The skin margins were undermined to an appropriate distance in all directions utilizing iris scissors.
Unique Flap 3 Name: Mercedes Flap
Ftsg Text: The defect edges were debeveled with a #15 scalpel blade.  Given the location of the defect, shape of the defect and the proximity to free margins a full thickness skin graft was deemed most appropriate.  Using a sterile surgical marker, the primary defect shape was transferred to the donor site. The area thus outlined was incised deep to adipose tissue with a #15 scalpel blade.  The harvested graft was then trimmed of adipose tissue until only dermis and epidermis was left.  The skin margins of the secondary defect were undermined to an appropriate distance in all directions utilizing iris scissors.  The secondary defect was closed with interrupted buried subcutaneous sutures.  The skin edges were then re-apposed with running  sutures.  The skin graft was then placed in the primary defect and oriented appropriately.
Length To Time In Minutes Device Was In Place: 10
Consent (Near Eyelid Margin)/Introductory Paragraph: The rationale for Mohs was explained to the patient and consent was obtained. The risks, benefits and alternatives to therapy were discussed in detail. Specifically, the risks of ectropion or eyelid deformity, infection, scarring, bleeding, prolonged wound healing, incomplete removal, allergy to anesthesia, nerve injury and recurrence were addressed. Prior to the procedure, the treatment site was clearly identified and confirmed by the patient. All components of Universal Protocol/PAUSE Rule completed.
Double O-Z Plasty Text: The defect edges were debeveled with a #15 scalpel blade.  Given the location of the defect, shape of the defect and the proximity to free margins a Double O-Z plasty (double transposition flap) was deemed most appropriate.  Using a sterile surgical marker, the appropriate transposition flaps were drawn incorporating the defect and placing the expected incisions within the relaxed skin tension lines where possible. The area thus outlined was incised deep to adipose tissue with a #15 scalpel blade.  The skin margins were undermined to an appropriate distance in all directions utilizing iris scissors.  Hemostasis was achieved with electrocautery.  The flaps were then transposed into place, one clockwise and the other counterclockwise, and anchored with interrupted buried subcutaneous sutures.
No Repair - Repaired With Adjacent Surgical Defect Text (Leave Blank If You Do Not Want): After obtaining clear surgical margins the defect was repaired concurrently with another surgical defect which was in close approximation.
Bi-Rhombic Flap Text: The defect edges were debeveled with a #15 scalpel blade.  Given the location of the defect and the proximity to free margins a bi-rhombic flap was deemed most appropriate.  Using a sterile surgical marker, an appropriate rhombic flap was drawn incorporating the defect. The area thus outlined was incised deep to adipose tissue with a #15 scalpel blade.  The skin margins were undermined to an appropriate distance in all directions utilizing iris scissors.
Repair Type: None (only Mohs)
Complex Repair And Flap Additional Text (Will Appearing After The Standard Complex Repair Text): The complex repair was not sufficient to completely close the primary defect. The remaining additional defect was repaired with the flap mentioned below.
Suturegard Retention Suture: 2-0 Nylon
Consent 3/Introductory Paragraph: I gave the patient a chance to ask questions they had about the procedure.  Following this I explained the Mohs procedure and consent was obtained. The risks, benefits and alternatives to therapy were discussed in detail. Specifically, the risks of infection, scarring, bleeding, prolonged wound healing, incomplete removal, allergy to anesthesia, nerve injury and recurrence were addressed. Prior to the procedure, the treatment site was clearly identified and confirmed by the patient. All components of Universal Protocol/PAUSE Rule completed.
Area L Indication Text: Tumors in this location are included in Area L (trunk and extremities).  Mohs surgery is indicated for larger tumors, or tumors with aggressive histologic features, in these anatomic locations.
Eye Protection Verbiage: Before proceeding with the stage, a plastic scleral shield was inserted. The globe was anesthetized with a few drops of 1% lidocaine with 1:100,000 epinephrine. Then, an appropriate sized scleral shield was chosen and coated with lacrilube ointment. The shield was gently inserted and left in place for the duration of each stage. After the stage was completed, the shield was gently removed.
W Plasty Text: The lesion was extirpated to the level of the fat with a #15 scalpel blade.  Given the location of the defect, shape of the defect and the proximity to free margins a W-plasty was deemed most appropriate for repair.  Using a sterile surgical marker, the appropriate transposition arms of the W-plasty were drawn incorporating the defect and placing the expected incisions within the relaxed skin tension lines where possible.    The area thus outlined was incised deep to adipose tissue with a #15 scalpel blade.  The skin margins were undermined to an appropriate distance in all directions utilizing iris scissors.  The opposing transposition arms were then transposed into place in opposite direction and anchored with interrupted buried subcutaneous sutures.
Post-Care Instructions: I reviewed with the patient in detail post-care instructions. Patient is not to engage in any heavy lifting, exercise, or swimming for the next 14 days. Should the patient develop any fevers, chills, bleeding, severe pain patient will contact the office immediately.

## 2019-06-18 ENCOUNTER — APPOINTMENT (RX ONLY)
Dept: URBAN - METROPOLITAN AREA CLINIC 36 | Facility: CLINIC | Age: 84
Setting detail: DERMATOLOGY
End: 2019-06-18

## 2019-06-18 PROBLEM — C44.329 SQUAMOUS CELL CARCINOMA OF SKIN OF OTHER PARTS OF FACE: Status: ACTIVE | Noted: 2019-06-18

## 2019-06-18 PROCEDURE — 17312 MOHS ADDL STAGE: CPT | Mod: 79

## 2019-06-18 PROCEDURE — 17311 MOHS 1 STAGE H/N/HF/G: CPT | Mod: 79

## 2019-06-18 PROCEDURE — 13132 CMPLX RPR F/C/C/M/N/AX/G/H/F: CPT | Mod: 79

## 2019-06-18 PROCEDURE — ? MOHS SURGERY

## 2019-06-18 NOTE — PROCEDURE: MOHS SURGERY
Quadrant Reporting?: no
Dressing: dry sterile dressing
Rhomboid Transposition Flap Text: The defect edges were debeveled with a #15 scalpel blade.  Given the location of the defect and the proximity to free margins a rhomboid transposition flap was deemed most appropriate.  Using a sterile surgical marker, an appropriate rhomboid flap was drawn incorporating the defect.    The area thus outlined was incised deep to adipose tissue with a #15 scalpel blade.  The skin margins were undermined to an appropriate distance in all directions utilizing iris scissors.
Mid-Level Procedure Text (D): After obtaining clear surgical margins the patient was sent to a mid-level provider for surgical repair.  The patient understands they will receive post-surgical care and follow-up from the mid-level provider.
Show Repair Surgeon Variable: Yes
M-Plasty Complex Repair Preamble Text (Leave Blank If You Do Not Want): Extensive wide undermining was performed.
Ear Wedge Repair Text: A wedge excision was completed by carrying down an excision through the full thickness of the ear and cartilage with an inward facing Burow's triangle. The wound was then closed in a layered fashion.
Quadrants Positive?: 0
Unique Flap 3 Name: Mercedes Flap
O-T Plasty Text: The defect edges were debeveled with a #15 scalpel blade.  Given the location of the defect, shape of the defect and the proximity to free margins an O-T plasty was deemed most appropriate.  Using a sterile surgical marker, an appropriate O-T plasty was drawn incorporating the defect and placing the expected incisions within the relaxed skin tension lines where possible.    The area thus outlined was incised deep to adipose tissue with a #15 scalpel blade.  The skin margins were undermined to an appropriate distance in all directions utilizing iris scissors.
Oculoplastic Surgeon Procedure Text (E): After obtaining clear surgical margins the patient was sent to oculoplastics for surgical repair.  The patient understands they will receive post-surgical care and follow-up from the referring physician's office.
O-T Advancement Flap Text: The defect edges were debeveled with a #15 scalpel blade.  Given the location of the defect, shape of the defect and the proximity to free margins an O-T advancement flap was deemed most appropriate.  Using a sterile surgical marker, an appropriate advancement flap was drawn incorporating the defect and placing the expected incisions within the relaxed skin tension lines where possible.    The area thus outlined was incised deep to adipose tissue with a #15 scalpel blade.  The skin margins were undermined to an appropriate distance in all directions utilizing iris scissors.
Medical Necessity Statement: Based on my medical judgement, Mohs surgery is the most appropriate treatment for this cancer compared to other treatments.
Stage 7: Additional Anesthesia Type: 1% lidocaine with epinephrine
M-Plasty Intermediate Repair Preamble Text (Leave Blank If You Do Not Want): Undermining was performed with blunt dissection.
Ear Star Wedge Flap Text: The defect edges were debeveled with a #15 blade scalpel.  Given the location of the defect and the proximity to free margins (helical rim) an ear star wedge flap was deemed most appropriate.  Using a sterile surgical marker, the appropriate flap was drawn incorporating the defect and placing the expected incisions between the helical rim and antihelix where possible.  The area thus outlined was incised through and through with a #15 scalpel blade.
Number Of Stages: 2
Cartilage Graft Text: The defect edges were debeveled with a #15 scalpel blade.  Given the location of the defect, shape of the defect, the fact the defect involved a full thickness cartilage defect a cartilage graft was deemed most appropriate.  An appropriate donor site was identified, cleansed, and anesthetized. The cartilage graft was then harvested and transferred to the recipient site, oriented appropriately and then sutured into place.  The secondary defect was then repaired using a primary closure.
Unique Flap 3 Text: The defect edges were debeveled with a #15 scalpel blade.  Given the location of the defect, shape of the defect and the proximity to free margins a Mercedes (double advancement flap) was deemed most appropriate.  Using a sterile surgical marker, the appropriate transposition flaps were drawn incorporating the defect and placing the expected incisions within the relaxed skin tension lines where possible.    The area thus outlined was incised deep to adipose tissue with a #15 scalpel blade.  The skin margins were undermined to an appropriate distance in all directions utilizing iris scissors.  Hemostasis was achieved with electrocautery.  The flaps were then advanced into the defect and anchored with interrupted buried subcutaneous sutures.
Asc Procedure Text (A): After obtaining clear surgical margins the patient was sent to an ASC for surgical repair.  The patient understands they will receive post-surgical care and follow-up from the ASC physician.
Consent 3/Introductory Paragraph: I gave the patient a chance to ask questions they had about the procedure.  Following this I explained the Mohs procedure and consent was obtained. The risks, benefits and alternatives to therapy were discussed in detail. Specifically, the risks of infection, scarring, bleeding, prolonged wound healing, incomplete removal, allergy to anesthesia, nerve injury and recurrence were addressed. Prior to the procedure, the treatment site was clearly identified and confirmed by the patient. All components of Universal Protocol/PAUSE Rule completed.
V-Y Flap Text: The defect edges were debeveled with a #15 scalpel blade.  Given the location of the defect, shape of the defect and the proximity to free margins a V-Y flap was deemed most appropriate.  Using a sterile surgical marker, an appropriate advancement flap was drawn incorporating the defect and placing the expected incisions within the relaxed skin tension lines where possible.    The area thus outlined was incised deep to adipose tissue with a #15 scalpel blade.  The skin margins were undermined to an appropriate distance in all directions utilizing iris scissors.
V-Y Plasty Text: The defect edges were debeveled with a #15 scalpel blade.  Given the location of the defect, shape of the defect and the proximity to free margins an V-Y advancement flap was deemed most appropriate.  Using a sterile surgical marker, an appropriate advancement flap was drawn incorporating the defect and placing the expected incisions within the relaxed skin tension lines where possible.    The area thus outlined was incised deep to adipose tissue with a #15 scalpel blade.  The skin margins were undermined to an appropriate distance in all directions utilizing iris scissors.
Graft Donor Site Epidermal Sutures (Optional): 5-0 Ethibond
Skin Substitute Text: The defect edges were debeveled with a #15 scalpel blade.  Given the location of the defect, shape of the defect and the proximity to free margins a skin substitute graft was deemed most appropriate.  The graft material was trimmed to fit the size of the defect. The graft was then placed in the primary defect and oriented appropriately.
Secondary Intention Text (Leave Blank If You Do Not Want): The defect will heal with secondary intention.
Repair Anesthesia Type: 1% lidocaine with 1:100,000 epinephrine and 408mcg clindamycin/ml and a 1:10 solution of 8.4% sodium bicarbonate
Tissue Cultured Epidermal Autograft Text: The defect edges were debeveled with a #15 scalpel blade.  Given the location of the defect, shape of the defect and the proximity to free margins a tissue cultured epidermal autograft was deemed most appropriate.  The graft was then trimmed to fit the size of the defect.  The graft was then placed in the primary defect and oriented appropriately.
Plastic Surgeon Procedure Text (C): After obtaining clear surgical margins the patient was sent to plastics for surgical repair.  The patient understands they will receive post-surgical care and follow-up from the referring physician's office.
Closure 4 Information: This tab is for additional flaps and grafts above and beyond our usual structured repairs.  Please note if you enter information here it will not currently bill and you will need to add the billing information manually.
No Repair - Repaired With Adjacent Surgical Defect Text (Leave Blank If You Do Not Want): After obtaining clear surgical margins the defect was repaired concurrently with another surgical defect which was in close approximation.
Hatchet Flap Text: The defect edges were debeveled with a #15 scalpel blade.  Given the location of the defect, shape of the defect and the proximity to free margins a hatchet flap based from the glabella was deemed most appropriate.  Using a sterile surgical marker, an appropriate glabellar hatchet flap was drawn incorporating the defect and placing the expected incisions within the relaxed skin tension lines where possible.    The area thus outlined was incised deep to adipose tissue with a #15 scalpel blade.  The skin margins were undermined to an appropriate distance in all directions utilizing iris scissors.
Consent (Ear)/Introductory Paragraph: The rationale for Mohs was explained to the patient and consent was obtained. The risks, benefits and alternatives to therapy were discussed in detail. Specifically, the risks of ear deformity, infection, scarring, bleeding, prolonged wound healing, incomplete removal, allergy to anesthesia, nerve injury and recurrence were addressed. Prior to the procedure, the treatment site was clearly identified and confirmed by the patient. All components of Universal Protocol/PAUSE Rule completed.
Cheek-To-Nose Interpolation Flap Text: A decision was made to reconstruct the defect utilizing an interpolation axial flap and a staged reconstruction.  A telfa template was made of the defect.  This telfa template was then used to outline the Cheek-To-Nose Interpolation flap.  The donor area for the pedicle flap was then injected with anesthesia.  The flap was excised through the skin and subcutaneous tissue down to the layer of the underlying musculature.  The interpolation flap was carefully excised within this deep plane to maintain its blood supply.  The edges of the donor site were undermined.   The donor site was closed in a primary fashion.  The pedicle was then rotated into position and sutured.  Once the tube was sutured into place, adequate blood supply was confirmed with blanching and refill.  The pedicle was then wrapped with xeroform gauze and dressed appropriately with a telfa and gauze bandage to ensure continued blood supply and protect the attached pedicle.
Unna Boot Text: An Unna boot was placed to help immobilize the limb and facilitate more rapid healing.
Stage 1: Number Of Blocks?: 1
Partial Purse String (Simple) Text: Given the location of the defect and the characteristics of the surrounding skin a simple purse string closure was deemed most appropriate.  Undermining was performed circumfirentially around the surgical defect.  A purse string suture was then placed and tightened. Wound tension only allowed a partial closure of the circular defect.
Provider Procedure Text (C): After obtaining clear surgical margins the defect was repaired by another provider.
Trilobed Flap Text: The defect edges were debeveled with a #15 scalpel blade.  Given the location of the defect and the proximity to free margins a trilobed flap was deemed most appropriate.  Using a sterile surgical marker, an appropriate trilobed flap drawn around the defect.    The area thus outlined was incised deep to adipose tissue with a #15 scalpel blade.  The skin margins were undermined to an appropriate distance in all directions utilizing iris scissors.
Otolaryngologist Procedure Text (D): After obtaining clear surgical margins the patient was sent to otolaryngology for surgical repair.  The patient understands they will receive post-surgical care and follow-up from the referring physician's office.
Posterior Auricular Interpolation Flap Text: A decision was made to reconstruct the defect utilizing an interpolation axial flap and a staged reconstruction.  A telfa template was made of the defect.  This telfa template was then used to outline the posterior auricular interpolation flap.  The donor area for the pedicle flap was then injected with anesthesia.  The flap was excised through the skin and subcutaneous tissue down to the layer of the underlying musculature.  The pedicle flap was carefully excised within this deep plane to maintain its blood supply.  The edges of the donor site were undermined.   The donor site was closed in a primary fashion.  The pedicle was then rotated into position and sutured.  Once the tube was sutured into place, adequate blood supply was confirmed with blanching and refill.  The pedicle was then wrapped with xeroform gauze and dressed appropriately with a telfa and gauze bandage to ensure continued blood supply and protect the attached pedicle.
Complex Repair Preamble Text (Leave Blank If You Do Not Want): Extensive wide undermining was performed at least 2 cm in all directions.
Transposition Flap Text: The defect edges were debeveled with a #15 scalpel blade.  Given the location of the defect and the proximity to free margins a transposition flap was deemed most appropriate.  Using a sterile surgical marker, an appropriate transposition flap was drawn incorporating the defect.    The area thus outlined was incised deep to adipose tissue with a #15 scalpel blade.  The skin margins were undermined to an appropriate distance in all directions utilizing iris scissors.
Detail Level: Detailed
Area L Indication Text: Tumors in this location are included in Area L (trunk and extremities).  Mohs surgery is indicated for larger tumors, or tumors with aggressive histologic features, in these anatomic locations.
Deep Sutures: 5-0 Vicryl
Surgical Defect Width In Cm (Optional): 1.5
Complex Repair And Flap Additional Text (Will Appearing After The Standard Complex Repair Text): The complex repair was not sufficient to completely close the primary defect. The remaining additional defect was repaired with the flap mentioned below.
Alar Island Pedicle Flap Text: The defect edges were debeveled with a #15 scalpel blade.  Given the location of the defect, shape of the defect and the proximity to the alar rim an island pedicle advancement flap was deemed most appropriate.  Using a sterile surgical marker, an appropriate advancement flap was drawn incorporating the defect, outlining the appropriate donor tissue and placing the expected incisions within the nasal ala running parallel to the alar rim. The area thus outlined was incised with a #15 scalpel blade.  The skin margins were undermined minimally to an appropriate distance in all directions around the primary defect and laterally outward around the island pedicle utilizing iris scissors.  There was minimal undermining beneath the pedicle flap.
Advancement Flap (Double) Text: The defect edges were debeveled with a #15 scalpel blade.  Given the location of the defect and the proximity to free margins a double advancement flap was deemed most appropriate.  Using a sterile surgical marker, the appropriate advancement flaps were drawn incorporating the defect and placing the expected incisions within the relaxed skin tension lines where possible.    The area thus outlined was incised deep to adipose tissue with a #15 scalpel blade.  The skin margins were undermined to an appropriate distance in all directions utilizing iris scissors.
Burow's Advancement Flap Text: The defect edges were debeveled with a #15 scalpel blade.  Given the location of the defect and the proximity to free margins a Burow's advancement flap was deemed most appropriate.  Using a sterile surgical marker, the appropriate advancement flap was drawn incorporating the defect and placing the expected incisions within the relaxed skin tension lines where possible.    The area thus outlined was incised deep to adipose tissue with a #15 scalpel blade.  The skin margins were undermined to an appropriate distance in all directions utilizing iris scissors.
Complex Repair And Graft Additional Text (Will Appearing After The Standard Complex Repair Text): The complex repair was not sufficient to completely close the primary defect. The remaining additional defect was repaired with the graft mentioned below.
Double Island Pedicle Flap Text: The defect edges were debeveled with a #15 scalpel blade.  Given the location of the defect, shape of the defect and the proximity to free margins a double island pedicle advancement flap was deemed most appropriate.  Using a sterile surgical marker, an appropriate advancement flap was drawn incorporating the defect, outlining the appropriate donor tissue and placing the expected incisions within the relaxed skin tension lines where possible.    The area thus outlined was incised deep to adipose tissue with a #15 scalpel blade.  The skin margins were undermined to an appropriate distance in all directions around the primary defect and laterally outward around the island pedicle utilizing iris scissors.  There was minimal undermining beneath the pedicle flap.
Bcc Histology Text: There were numerous aggregates of basaloid cells.
Simple / Intermediate / Complex Repair - Final Wound Length In Cm: 4.3
Bi-Rhombic Flap Text: The defect edges were debeveled with a #15 scalpel blade.  Given the location of the defect and the proximity to free margins a bi-rhombic flap was deemed most appropriate.  Using a sterile surgical marker, an appropriate rhombic flap was drawn incorporating the defect. The area thus outlined was incised deep to adipose tissue with a #15 scalpel blade.  The skin margins were undermined to an appropriate distance in all directions utilizing iris scissors.
Full Thickness Lip Wedge Repair (Flap) Text: Given the location of the defect and the proximity to free margins a full thickness wedge repair was deemed most appropriate.  Using a sterile surgical marker, the appropriate repair was drawn incorporating the defect and placing the expected incisions perpendicular to the vermilion border.  The vermilion border was also meticulously outlined to ensure appropriate reapproximation during the repair.  The area thus outlined was incised through and through with a #15 scalpel blade.  The muscularis and dermis were reaproximated with deep sutures following hemostasis. Care was taken to realign the vermilion border before proceeding with the superficial closure.  Once the vermilion was realigned the superfical and mucosal closure was finished.
Surgeon Performing Repair (Optional): Melly
O-Z Plasty Text: The defect edges were debeveled with a #15 scalpel blade.  Given the location of the defect, shape of the defect and the proximity to free margins an O-Z plasty (double transposition flap) was deemed most appropriate.  Using a sterile surgical marker, the appropriate transposition flaps were drawn incorporating the defect and placing the expected incisions within the relaxed skin tension lines where possible.    The area thus outlined was incised deep to adipose tissue with a #15 scalpel blade.  The skin margins were undermined to an appropriate distance in all directions utilizing iris scissors.  Hemostasis was achieved with electrocautery.  The flaps were then transposed into place, one clockwise and the other counterclockwise, and anchored with interrupted buried subcutaneous sutures.
O-L Flap Text: The defect edges were debeveled with a #15 scalpel blade.  Given the location of the defect, shape of the defect and the proximity to free margins an O-L flap was deemed most appropriate.  Using a sterile surgical marker, an appropriate advancement flap was drawn incorporating the defect and placing the expected incisions within the relaxed skin tension lines where possible.    The area thus outlined was incised deep to adipose tissue with a #15 scalpel blade.  The skin margins were undermined to an appropriate distance in all directions utilizing iris scissors.
Unique Flap 4 Name: Banner Flap
Alternatives Discussed Intro (Do Not Add Period): I discussed alternative treatments to Mohs surgery and specifically discussed the risks and benefits of
Estimated Blood Loss (Cc): less than 5 cc
Composite Graft Text: The defect edges were debeveled with a #15 scalpel blade.  Given the location of the defect, shape of the defect, the proximity to free margins and the fact the defect was full thickness a composite graft was deemed most appropriate.  The defect was outline and then transferred to the donor site.  A full thickness graft was then excised from the donor site. The graft was then placed in the primary defect, oriented appropriately and then sutured into place.  The secondary defect was then repaired using a primary closure.
Primary Defect Length In Cm (Final Defect Size - Required For Flaps/Grafts): 1.6
H Plasty Text: Given the location of the defect, shape of the defect and the proximity to free margins a H-plasty was deemed most appropriate for repair.  Using a sterile surgical marker, the appropriate advancement arms of the H-plasty were drawn incorporating the defect and placing the expected incisions within the relaxed skin tension lines where possible. The area thus outlined was incised deep to adipose tissue with a #15 scalpel blade. The skin margins were undermined to an appropriate distance in all directions utilizing iris scissors.  The opposing advancement arms were then advanced into place in opposite direction and anchored with interrupted buried subcutaneous sutures.
Advancement-Rotation Flap Text: The defect edges were debeveled with a #15 scalpel blade.  Given the location of the defect, shape of the defect and the proximity to free margins an advancement-rotation flap was deemed most appropriate.  Using a sterile surgical marker, an appropriate flap was drawn incorporating the defect and placing the expected incisions within the relaxed skin tension lines where possible. The area thus outlined was incised deep to adipose tissue with a #15 scalpel blade.  The skin margins were undermined to an appropriate distance in all directions utilizing iris scissors.
Eye Protection Verbiage: Before proceeding with the stage, a plastic scleral shield was inserted. The globe was anesthetized with a few drops of 1% lidocaine with 1:100,000 epinephrine. Then, an appropriate sized scleral shield was chosen and coated with lacrilube ointment. The shield was gently inserted and left in place for the duration of each stage. After the stage was completed, the shield was gently removed.
Unique Flap 4 Text: The defect edges were debeveled with a #15 scalpel blade.  Given the location of the defect and the proximity to free margins a Banner transposition flap was deemed most appropriate.  Using a sterile surgical marker, an appropriate Banner transposition flap was drawn incorporating the defect.    The area thus outlined was incised deep to adipose tissue with a #15 scalpel blade.  The skin margins were undermined to an appropriate distance in all directions utilizing iris scissors.
Consent (Temporal Branch)/Introductory Paragraph: The rationale for Mohs was explained to the patient and consent was obtained. The risks, benefits and alternatives to therapy were discussed in detail. Specifically, the risks of damage to the temporal branch of the facial nerve, infection, scarring, bleeding, prolonged wound healing, incomplete removal, allergy to anesthesia, and recurrence were addressed. Prior to the procedure, the treatment site was clearly identified and confirmed by the patient. All components of Universal Protocol/PAUSE Rule completed.
Mohs Case Number: [id number]
Consent (Marginal Mandibular)/Introductory Paragraph: The rationale for Mohs was explained to the patient and consent was obtained. The risks, benefits and alternatives to therapy were discussed in detail. Specifically, the risks of damage to the marginal mandibular branch of the facial nerve, infection, scarring, bleeding, prolonged wound healing, incomplete removal, allergy to anesthesia, and recurrence were addressed. Prior to the procedure, the treatment site was clearly identified and confirmed by the patient. All components of Universal Protocol/PAUSE Rule completed.
Banner Transposition Flap Text: The defect edges were debeveled with a #15 scalpel blade.  Given the location of the defect and the proximity to free margins a Banner transposition flap was deemed most appropriate.  Using a sterile surgical marker, an appropriate flap drawn around the defect. The area thus outlined was incised deep to adipose tissue with a #15 scalpel blade.  The skin margins were undermined to an appropriate distance in all directions utilizing iris scissors.
Anesthesia Volume In Cc: 6
Epidermal Closure Graft Donor Site (Optional): simple interrupted
Xenograft Text: The defect edges were debeveled with a #15 scalpel blade.  Given the location of the defect, shape of the defect and the proximity to free margins a xenograft was deemed most appropriate.  The graft was then trimmed to fit the size of the defect.  The graft was then placed in the primary defect and oriented appropriately.
Interpolation Flap Text: A decision was made to reconstruct the defect utilizing an interpolation axial flap and a staged reconstruction.  A telfa template was made of the defect.  This telfa template was then used to outline the interpolation flap.  The donor area for the pedicle flap was then injected with anesthesia.  The flap was excised through the skin and subcutaneous tissue down to the layer of the underlying musculature.  The interpolation flap was carefully excised within this deep plane to maintain its blood supply.  The edges of the donor site were undermined.   The donor site was closed in a primary fashion.  The pedicle was then rotated into position and sutured.  Once the tube was sutured into place, adequate blood supply was confirmed with blanching and refill.  The pedicle was then wrapped with xeroform gauze and dressed appropriately with a telfa and gauze bandage to ensure continued blood supply and protect the attached pedicle.
Consent (Nose)/Introductory Paragraph: The rationale for Mohs was explained to the patient and consent was obtained. The risks, benefits and alternatives to therapy were discussed in detail. Specifically, the risks of nasal deformity, changes in the flow of air through the nose, infection, scarring, bleeding, prolonged wound healing, incomplete removal, allergy to anesthesia, nerve injury and recurrence were addressed. Prior to the procedure, the treatment site was clearly identified and confirmed by the patient. All components of Universal Protocol/PAUSE Rule completed.
Post-Care Instructions: I reviewed with the patient in detail post-care instructions. Patient is not to engage in any heavy lifting, exercise, or swimming for the next 14 days. Should the patient develop any fevers, chills, bleeding, severe pain patient will contact the office immediately.
Rotation Flap Text: The defect edges were debeveled with a #15 scalpel blade.  Given the location of the defect, shape of the defect and the proximity to free margins a rotation flap was deemed most appropriate.  Using a sterile surgical marker, an appropriate rotation flap was drawn incorporating the defect and placing the expected incisions within the relaxed skin tension lines where possible.    The area thus outlined was incised deep to adipose tissue with a #15 scalpel blade.  The skin margins were undermined to an appropriate distance in all directions utilizing iris scissors.
Same Histology In Subsequent Stages Text: The pattern and morphology of the tumor is as described in the first stage.
Home Suture Removal Text: Patient was provided instructions on removing sutures and will remove their sutures at home.  If they have any questions or difficulties they will call the office.
Partial Purse String (Intermediate) Text: Given the location of the defect and the characteristics of the surrounding skin an intermediate purse string closure was deemed most appropriate.  Undermining was performed circumfirentially around the surgical defect.  A purse string suture was then placed and tightened. Wound tension only allowed a partial closure of the circular defect.
Dorsal Nasal Flap Text: The defect edges were debeveled with a #15 scalpel blade.  Given the location of the defect and the proximity to free margins a dorsal nasal flap,based upon the glabellar folds, was deemed most appropriate.  Using a sterile surgical marker, an appropriate dorsal nasal flap was drawn around the defect.    The area thus outlined was incised deep to adipose tissue with a #15 scalpel blade.  The skin margins were undermined to an appropriate distance in all directions utilizing iris scissors.
Anesthesia Type: 0.5% lidocaine with 1:200,000 epinephrine and a 1:10 solution of 8.4% sodium bicarbonate and 408mcg clindamycin/ml
Muscle Hinge Flap Text: The defect edges were debeveled with a #15 scalpel blade.  Given the size, depth and location of the defect and the proximity to free margins a muscle hinge flap was deemed most appropriate.  Using a sterile surgical marker, an appropriate hinge flap was drawn incorporating the defect. The area thus outlined was incised with a #15 scalpel blade.  The skin margins were undermined to an appropriate distance in all directions utilizing iris scissors.
Paramedian Forehead Flap Text: A decision was made to reconstruct the defect utilizing an interpolation axial flap and a staged reconstruction.  A telfa template was made of the defect.  This telfa template was then used to outline the paramedian forehead pedicle flap.  The donor area for the pedicle flap was then injected with anesthesia.  The flap was excised through the skin and subcutaneous tissue down to the layer of the underlying musculature.  The pedicle flap was carefully excised within this deep plane to maintain its blood supply.  The edges of the donor site were undermined.   The donor site was closed in a primary fashion.  The pedicle was then rotated into position and sutured.  Once the tube was sutured into place, adequate blood supply was confirmed with blanching and refill.  The pedicle was then wrapped with xeroform gauze and dressed appropriately with a telfa and gauze bandage to ensure continued blood supply and protect the attached pedicle.
Suturegard Intro: Intraoperative tissue expansion was performed, utilizing the SUTUREGARD device, in order to reduce wound tension.
Retention Suture Bite Size: 3 mm
Unique Flap 1 Name: Myocutaneous Island pedicle Flap
Crescentic Advancement Flap Text: The defect edges were debeveled with a #15 scalpel blade.  Given the location of the defect and the proximity to free margins a crescentic advancement flap was deemed most appropriate.  Using a sterile surgical marker, the appropriate advancement flap was drawn incorporating the defect and placing the expected incisions within the relaxed skin tension lines where possible.    The area thus outlined was incised deep to adipose tissue with a #15 scalpel blade.  The skin margins were undermined to an appropriate distance in all directions utilizing iris scissors.
Island Pedicle Flap-Requiring Vessel Identification Text: The defect edges were debeveled with a #15 scalpel blade.  Given the location of the defect, shape of the defect and the proximity to free margins an island pedicle advancement flap was deemed most appropriate.  Using a sterile surgical marker, an appropriate advancement flap was drawn, based on the axial vessel mentioned above, incorporating the defect, outlining the appropriate donor tissue and placing the expected incisions within the relaxed skin tension lines where possible.    The area thus outlined was incised deep to adipose tissue with a #15 scalpel blade.  The skin margins were undermined to an appropriate distance in all directions around the primary defect and laterally outward around the island pedicle utilizing iris scissors.  There was minimal undermining beneath the pedicle flap.
Bcc Infiltrative Histology Text: There were numerous aggregates of basaloid cells demonstrating an infiltrative pattern.
Ftsg Text: The defect edges were debeveled with a #15 scalpel blade.  Given the location of the defect, shape of the defect and the proximity to free margins a full thickness skin graft was deemed most appropriate.  Using a sterile surgical marker, the primary defect shape was transferred to the donor site. The area thus outlined was incised deep to adipose tissue with a #15 scalpel blade.  The harvested graft was then trimmed of adipose tissue until only dermis and epidermis was left.  The skin margins of the secondary defect were undermined to an appropriate distance in all directions utilizing iris scissors.  The secondary defect was closed with interrupted buried subcutaneous sutures.  The skin edges were then re-apposed with running  sutures.  The skin graft was then placed in the primary defect and oriented appropriately.
Initial Size Of Lesion: 0.6
Helical Rim Advancement Flap Text: The defect edges were debeveled with a #15 blade scalpel.  Given the location of the defect and the proximity to free margins (helical rim) a double helical rim advancement flap was deemed most appropriate.  Using a sterile surgical marker, the appropriate advancement flaps were drawn incorporating the defect and placing the expected incisions between the helical rim and antihelix where possible.  The area thus outlined was incised through and through with a #15 scalpel blade.  With a skin hook and iris scissors, the flaps were gently and sharply undermined and freed up.
Graft Basting Suture (Optional): 5-0 Fast Absorbing Gut
Complex/Intermediate Repair Variations: Crescentic
Consent 1/Introductory Paragraph: The rationale for Mohs was explained to the patient and consent was obtained. The risks, benefits and alternatives to therapy were discussed in detail. Specifically, the risks of infection, scarring, bleeding, prolonged wound healing, incomplete removal, allergy to anesthesia, nerve injury and recurrence were addressed. Prior to the procedure, the treatment site was clearly identified and confirmed by the patient. All components of Universal Protocol/PAUSE Rule completed.
Epidermal Sutures: 5-0 Ethilon
Unique Flap 1 Text: A decision was made to reconstruct the defect utilizing a myocutaneous Island pedicle Flap based on the levator labii superioris muscle.  A telfa template was made of the defect.  This telfa template was then used to outline the myocutaneous flap, based along the meilolabial fold.  The donor area for the pedicle flap was then injected with anesthesia.  The flap was excised through the skin and subcutaneous tissue down to the layer of the underlying musculature.  The myocutaneous flap was carefully excised within this deep plane to maintain its blood supply. Based on the muscle. The edges of the donor site were undermined.   The donor site was closed in a primary fashion to the point of transposition.  The pedicle was then transposed into position and sutured.  Once the flap was sutured into place, adequate blood supply was confirmed with blanching and refill.
Double O-Z Plasty Text: The defect edges were debeveled with a #15 scalpel blade.  Given the location of the defect, shape of the defect and the proximity to free margins a Double O-Z plasty (double transposition flap) was deemed most appropriate.  Using a sterile surgical marker, the appropriate transposition flaps were drawn incorporating the defect and placing the expected incisions within the relaxed skin tension lines where possible. The area thus outlined was incised deep to adipose tissue with a #15 scalpel blade.  The skin margins were undermined to an appropriate distance in all directions utilizing iris scissors.  Hemostasis was achieved with electrocautery.  The flaps were then transposed into place, one clockwise and the other counterclockwise, and anchored with interrupted buried subcutaneous sutures.
O-Z Flap Text: The defect edges were debeveled with a #15 scalpel blade.  Given the location of the defect, shape of the defect and the proximity to free margins an O-Z flap was deemed most appropriate.  Using a sterile surgical marker, an appropriate transposition flap was drawn incorporating the defect and placing the expected incisions within the relaxed skin tension lines where possible. The area thus outlined was incised deep to adipose tissue with a #15 scalpel blade.  The skin margins were undermined to an appropriate distance in all directions utilizing iris scissors.
Repair Anesthesia Method: local infiltration
Information: Selecting Yes will display possible errors in your note based on the variables you have selected. This validation is only offered as a suggestion for you. PLEASE NOTE THAT THE VALIDATION TEXT WILL BE REMOVED WHEN YOU FINALIZE YOUR NOTE. IF YOU WANT TO FAX A PRELIMINARY NOTE YOU WILL NEED TO TOGGLE THIS TO 'NO' IF YOU DO NOT WANT IT IN YOUR FAXED NOTE.
Non-Graft Cartilage Fenestration Text: The cartilage was fenestrated with a 2mm punch biopsy to help facilitate healing.
Epidermal Autograft Text: The defect edges were debeveled with a #15 scalpel blade.  Given the location of the defect, shape of the defect and the proximity to free margins an epidermal autograft was deemed most appropriate.  Using a sterile surgical marker, the primary defect shape was transferred to the donor site. The epidermal graft was then harvested.  The skin graft was then placed in the primary defect and oriented appropriately.
W Plasty Text: The lesion was extirpated to the level of the fat with a #15 scalpel blade.  Given the location of the defect, shape of the defect and the proximity to free margins a W-plasty was deemed most appropriate for repair.  Using a sterile surgical marker, the appropriate transposition arms of the W-plasty were drawn incorporating the defect and placing the expected incisions within the relaxed skin tension lines where possible.    The area thus outlined was incised deep to adipose tissue with a #15 scalpel blade.  The skin margins were undermined to an appropriate distance in all directions utilizing iris scissors.  The opposing transposition arms were then transposed into place in opposite direction and anchored with interrupted buried subcutaneous sutures.
Manual Repair Warning Statement: We plan on removing the manually selected variable below in favor of our much easier automatic structured text blocks found in the previous tab. We decided to do this to help make the flow better and give you the full power of structured data. Manual selection is never going to be ideal in our platform and I would encourage you to avoid using manual selection from this point on, especially since I will be sunsetting this feature. It is important that you do one of two things with the customized text below. First, you can save all of the text in a word file so you can have it for future reference. Second, transfer the text to the appropriate area in the Library tab. Lastly, if there is a flap or graft type which we do not have you need to let us know right away so I can add it in before the variable is hidden. No need to panic, we plan to give you roughly 6 months to make the change.
Mohs Method Verbiage: An incision at a 45 degree angle following the standard Mohs approach was done and the specimen was harvested as a microscopic controlled layer.
Mercedes Flap Text: The defect edges were debeveled with a #15 scalpel blade.  Given the location of the defect, shape of the defect and the proximity to free margins a Mercedes flap was deemed most appropriate.  Using a sterile surgical marker, an appropriate advancement flap was drawn incorporating the defect and placing the expected incisions within the relaxed skin tension lines where possible. The area thus outlined was incised deep to adipose tissue with a #15 scalpel blade.  The skin margins were undermined to an appropriate distance in all directions utilizing iris scissors.
Z Plasty Text: The lesion was extirpated to the level of the fat with a #15 scalpel blade.  Given the location of the defect, shape of the defect and the proximity to free margins a Z-plasty was deemed most appropriate for repair.  Using a sterile surgical marker, the appropriate transposition arms of the Z-plasty were drawn incorporating the defect and placing the expected incisions within the relaxed skin tension lines where possible.    The area thus outlined was incised deep to adipose tissue with a #15 scalpel blade.  The skin margins were undermined to an appropriate distance in all directions utilizing iris scissors.  The opposing transposition arms were then transposed into place in opposite direction and anchored with interrupted buried subcutaneous sutures.
Surgeon/Pathologist Verbiage (Will Incorporate Name Of Surgeon From Intro If Not Blank): operated in two distinct and integrated capacities as the surgeon and pathologist.
Modified Advancement Flap Text: The defect edges were debeveled with a #15 scalpel blade.  Given the location of the defect, shape of the defect and the proximity to free margins a modified advancement flap was deemed most appropriate.  Using a sterile surgical marker, an appropriate advancement flap was drawn incorporating the defect and placing the expected incisions within the relaxed skin tension lines where possible.    The area thus outlined was incised deep to adipose tissue with a #15 scalpel blade.  The skin margins were undermined to an appropriate distance in all directions utilizing iris scissors.
Consent (Spinal Accessory)/Introductory Paragraph: The rationale for Mohs was explained to the patient and consent was obtained. The risks, benefits and alternatives to therapy were discussed in detail. Specifically, the risks of damage to the spinal accessory nerve, infection, scarring, bleeding, prolonged wound healing, incomplete removal, allergy to anesthesia, and recurrence were addressed. Prior to the procedure, the treatment site was clearly identified and confirmed by the patient. All components of Universal Protocol/PAUSE Rule completed.
Purse String (Simple) Text: Given the location of the defect and the characteristics of the surrounding skin a purse string closure was deemed most appropriate.  Undermining was performed circumfirentially around the surgical defect.  A purse string suture was then placed and tightened.
Bilobed Flap Text: The defect edges were debeveled with a #15 scalpel blade.  Given the location of the defect and the proximity to free margins a bilobe flap was deemed most appropriate.  Using a sterile surgical marker, an appropriate bilobe flap drawn around the defect.    The area thus outlined was incised deep to adipose tissue with a #15 scalpel blade.  The skin margins were undermined to an appropriate distance in all directions utilizing iris scissors.
Melolabial Interpolation Flap Text: A decision was made to reconstruct the defect utilizing an interpolation axial flap and a staged reconstruction.  A telfa template was made of the defect.  This telfa template was then used to outline the melolabial interpolation flap.  The donor area for the pedicle flap was then injected with anesthesia.  The flap was excised through the skin and subcutaneous tissue down to the layer of the underlying musculature.  The pedicle flap was carefully excised within this deep plane to maintain its blood supply.  The edges of the donor site were undermined.   The donor site was closed in a primary fashion.  The pedicle was then rotated into position and sutured.  Once the tube was sutured into place, adequate blood supply was confirmed with blanching and refill.  The pedicle was then wrapped with xeroform gauze and dressed appropriately with a telfa and gauze bandage to ensure continued blood supply and protect the attached pedicle.
Spiral Flap Text: The defect edges were debeveled with a #15 scalpel blade.  Given the location of the defect, shape of the defect and the proximity to free margins a spiral flap was deemed most appropriate.  Using a sterile surgical marker, an appropriate rotation flap was drawn incorporating the defect and placing the expected incisions within the relaxed skin tension lines where possible. The area thus outlined was incised deep to adipose tissue with a #15 scalpel blade.  The skin margins were undermined to an appropriate distance in all directions utilizing iris scissors.
Subsequent Stages Histo Method Verbiage: Using a similar technique to that described above, a thin layer of tissue was removed from all areas where tumor was visible on the previous stage.  The tissue was again oriented, mapped, dyed, and processed as above.
No Residual Tumor Seen Histology Text: There were no malignant cells seen in the sections examined.
Surgical Defect Length In Cm (Optional): 1.1
Consent (Lip)/Introductory Paragraph: The rationale for Mohs was explained to the patient and consent was obtained. The risks, benefits and alternatives to therapy were discussed in detail. Specifically, the risks of lip deformity, changes in the oral aperture, infection, scarring, bleeding, prolonged wound healing, incomplete removal, allergy to anesthesia, nerve injury and recurrence were addressed. Prior to the procedure, the treatment site was clearly identified and confirmed by the patient. All components of Universal Protocol/PAUSE Rule completed.
Localized Dermabrasion With Wire Brush Text: The patient was draped in routine manner.  Localized dermabrasion using 3 x 17 mm wire brush was performed in routine manner to papillary dermis. This spot dermabrasion is being performed to complete skin cancer reconstruction. It also will eliminate the other sun damaged precancerous cells that are known to be part of the regional effect of a lifetime's worth of sun exposure. This localized dermabrasion is therapeutic and should not be considered cosmetic in any regard.
Island Pedicle Flap Text: The defect edges were debeveled with a #15 scalpel blade.  Given the location of the defect, shape of the defect and the proximity to free margins an island pedicle advancement flap was deemed most appropriate.  Using a sterile surgical marker, an appropriate advancement flap was drawn incorporating the defect, outlining the appropriate donor tissue and placing the expected incisions within the relaxed skin tension lines where possible.    The area thus outlined was incised deep to adipose tissue with a #15 scalpel blade.  The skin margins were undermined to an appropriate distance in all directions around the primary defect and laterally outward around the island pedicle utilizing iris scissors.  There was minimal undermining beneath the pedicle flap.
Closure 2 Information: This tab is for additional flaps and grafts, including complex repair and grafts and complex repair and flaps. You can also specify a different location for the additional defect, if the location is the same you do not need to select a new one. We will insert the automated text for the repair you select below just as we do for solitary flaps and grafts. Please note that at this time if you select a location with a different insurance zone you will need to override the ICD10 and CPT if appropriate.
Area H Indication Text: Tumors in this location are included in Area H (eyelids, eyebrows, nose, lips, chin, ear, pre-auricular, post-auricular, temple, genitalia, hands, feet, ankles and areola).  Tissue conservation is critical in these anatomic locations.
Suturegard Body: The suture ends were repeatedly re-tightened and re-clamped to achieve the desired tissue expansion.
Cheiloplasty (Less Than 50%) Text: A decision was made to reconstruct the defect with a  cheiloplasty.  The defect was undermined extensively.  Additional obicularis oris muscle was excised with a 15 blade scalpel.  The defect was converted into a full thickness wedge, of less than 50% of the vertical height of the lip, to facilite a better cosmetic result.  Small vessels were then tied off with 5-0 monocyrl. The obicularis oris, superficial fascia, adipose and dermis were then reapproximated.  After the deeper layers were approximated the epidermis was reapproximated with particular care given to realign the vermilion border.
Mauc Instructions: By selecting yes to the question below the MAUC number will be added into the note.  This will be calculated automatically based on the diagnosis chosen, the size entered, the body zone selected (H,M,L) and the specific indications you chose. You will also have the option to override the Mohs AUC if you disagree with the automatically calculated number and this option is found in the Case Summary tab.
Melolabial Transposition Flap Text: The defect edges were debeveled with a #15 scalpel blade.  Given the location of the defect and the proximity to free margins a melolabial flap was deemed most appropriate.  Using a sterile surgical marker, an appropriate melolabial transposition flap was drawn incorporating the defect.    The area thus outlined was incised deep to adipose tissue with a #15 scalpel blade.  The skin margins were undermined to an appropriate distance in all directions utilizing iris scissors.
Wound Care: Aquaphor
Rhombic Flap Text: The defect edges were debeveled with a #15 scalpel blade.  Given the location of the defect and the proximity to free margins a rhombic flap was deemed most appropriate.  Using a sterile surgical marker, an appropriate rhombic flap was drawn incorporating the defect.    The area thus outlined was incised deep to adipose tissue with a #15 scalpel blade.  The skin margins were undermined to an appropriate distance in all directions utilizing iris scissors.
Length To Time In Minutes Device Was In Place: 10
Cheiloplasty (Complex) Text: A decision was made to reconstruct the defect with a  cheiloplasty.  The defect was undermined extensively.  Additional obicularis oris muscle was excised with a 15 blade scalpel.  The defect was converted into a full thickness wedge to facilite a better cosmetic result.  Small vessels were then tied off with 5-0 monocyrl. The obicularis oris, superficial fascia, adipose and dermis were then reapproximated.  After the deeper layers were approximated the epidermis was reapproximated with particular care given to realign the vermilion border.
Unique Flap 2 Name: Peng Flap
Keystone Flap Text: The defect edges were debeveled with a #15 scalpel blade.  Given the location of the defect, shape of the defect a keystone flap was deemed most appropriate.  Using a sterile surgical marker, an appropriate keystone flap was drawn incorporating the defect, outlining the appropriate donor tissue and placing the expected incisions within the relaxed skin tension lines where possible. The area thus outlined was incised deep to adipose tissue with a #15 scalpel blade.  The skin margins were undermined to an appropriate distance in all directions around the primary defect and laterally outward around the flap utilizing iris scissors.
A-T Advancement Flap Text: The defect edges were debeveled with a #15 scalpel blade.  Given the location of the defect, shape of the defect and the proximity to free margins an A-T advancement flap was deemed most appropriate.  Using a sterile surgical marker, an appropriate advancement flap was drawn incorporating the defect and placing the expected incisions within the relaxed skin tension lines where possible.    The area thus outlined was incised deep to adipose tissue with a #15 scalpel blade.  The skin margins were undermined to an appropriate distance in all directions utilizing iris scissors.
Hemostasis: Electrocautery
Split-Thickness Skin Graft Text: The defect edges were debeveled with a #15 scalpel blade.  Given the location of the defect, shape of the defect and the proximity to free margins a split thickness skin graft was deemed most appropriate.  Using a sterile surgical marker, the primary defect shape was transferred to the donor site. The split thickness graft was then harvested.  The skin graft was then placed in the primary defect and oriented appropriately.
Location Indication Override (Is Already Calculated Based On Selected Body Location): Area H
Bilateral Helical Rim Advancement Flap Text: The defect edges were debeveled with a #15 blade scalpel.  Given the location of the defect and the proximity to free margins (helical rim) a bilateral helical rim advancement flap was deemed most appropriate.  Using a sterile surgical marker, the appropriate advancement flaps were drawn incorporating the defect and placing the expected incisions between the helical rim and antihelix where possible.  The area thus outlined was incised through and through with a #15 scalpel blade.  With a skin hook and iris scissors, the flaps were gently and sharply undermined and freed up.
Wound Care (No Sutures): Petrolatum
Postop Diagnosis: same
Double O-Z Flap Text: The defect edges were debeveled with a #15 scalpel blade.  Given the location of the defect, shape of the defect and the proximity to free margins a Double O-Z flap was deemed most appropriate.  Using a sterile surgical marker, an appropriate transposition flap was drawn incorporating the defect and placing the expected incisions within the relaxed skin tension lines where possible. The area thus outlined was incised deep to adipose tissue with a #15 scalpel blade.  The skin margins were undermined to an appropriate distance in all directions utilizing iris scissors.
Consent 2/Introductory Paragraph: Mohs surgery was explained to the patient and consent was obtained. The risks, benefits and alternatives to therapy were discussed in detail. Specifically, the risks of infection, scarring, bleeding, prolonged wound healing, incomplete removal, allergy to anesthesia, nerve injury and recurrence were addressed. Prior to the procedure, the treatment site was clearly identified and confirmed by the patient. All components of Universal Protocol/PAUSE Rule completed.
Unique Flap 2 Text: A decision was made to reconstruct the defect utilizing a Peng Flap (Bilateral Advancement Rotation Flap). Given the location of the defect and the proximity to free margins, this flap was deemed most appropriate.  Using a sterile surgical marker, the appropriate rotation flaps were drawn incorporating the defect and placing the expected incisions within the relaxed skin tension lines where possible.    The area thus outlined was incised deep to adipose tissue with a #15 scalpel blade.  The skin margins were undermined to an appropriate distance in all directions utilizing iris scissors.
S Plasty Text: Given the location and shape of the defect, and the orientation of relaxed skin tension lines, an S-plasty was deemed most appropriate for repair.  Using a sterile surgical marker, the appropriate outline of the S-plasty was drawn, incorporating the defect and placing the expected incisions within the relaxed skin tension lines where possible.  The area thus outlined was incised deep to adipose tissue with a #15 scalpel blade.  The skin margins were undermined to an appropriate distance in all directions utilizing iris scissors. The skin flaps were advanced over the defect.  The opposing margins were then approximated with interrupted buried subcutaneous sutures.
Repair Type: Complex Repair
Suture Removal: 7 days
Dermal Autograft Text: The defect edges were debeveled with a #15 scalpel blade.  Given the location of the defect, shape of the defect and the proximity to free margins a dermal autograft was deemed most appropriate.  Using a sterile surgical marker, the primary defect shape was transferred to the donor site. The area thus outlined was incised deep to adipose tissue with a #15 scalpel blade.  The harvested graft was then trimmed of adipose and epidermal tissue until only dermis was left.  The skin graft was then placed in the primary defect and oriented appropriately.
Graft Cartilage Fenestration Text: The cartilage was fenestrated with a 2mm punch biopsy to help facilitate graft survival and healing.
Mucosal Advancement Flap Text: Given the location of the defect, shape of the defect and the proximity to free margins a mucosal advancement flap was deemed most appropriate. Incisions were made with a 15 blade scalpel in the appropriate fashion along the cutaneous vermilion border and the mucosal lip. The remaining actinically damaged mucosal tissue was excised.  The mucosal advancement flap was then elevated to the gingival sulcus with care taken to preserve the neurovascular structures and advanced into the primary defect. Care was taken to ensure that precise realignment of the vermilion border was achieved.
Previous Accession (Optional): [id number]
Consent (Near Eyelid Margin)/Introductory Paragraph: The rationale for Mohs was explained to the patient and consent was obtained. The risks, benefits and alternatives to therapy were discussed in detail. Specifically, the risks of ectropion or eyelid deformity, infection, scarring, bleeding, prolonged wound healing, incomplete removal, allergy to anesthesia, nerve injury and recurrence were addressed. Prior to the procedure, the treatment site was clearly identified and confirmed by the patient. All components of Universal Protocol/PAUSE Rule completed.
Epidermal Closure: running cuticular
Cheek Interpolation Flap Text: A decision was made to reconstruct the defect utilizing an interpolation axial flap and a staged reconstruction.  A telfa template was made of the defect.  This telfa template was then used to outline the Cheek Interpolation flap.  The donor area for the pedicle flap was then injected with anesthesia.  The flap was excised through the skin and subcutaneous tissue down to the layer of the underlying musculature.  The interpolation flap was carefully excised within this deep plane to maintain its blood supply.  The edges of the donor site were undermined.   The donor site was closed in a primary fashion.  The pedicle was then rotated into position and sutured.  Once the tube was sutured into place, adequate blood supply was confirmed with blanching and refill.  The pedicle was then wrapped with xeroform gauze and dressed appropriately with a telfa and gauze bandage to ensure continued blood supply and protect the attached pedicle.
Mohs Histo Method Verbiage: Each section was then chromacoded and processed in the Mohs lab using the Mohs protocol and submitted for frozen section.
Graft Donor Site Bandage (Optional-Leave Blank If You Don't Want In Note): Aquaphor and telefa placed on wound. Pressure dressing applied to donor site
Purse String (Intermediate) Text: Given the location of the defect and the characteristics of the surrounding skin a purse string intermediate closure was deemed most appropriate.  Undermining was performed circumfirentially around the surgical defect.  A purse string suture was then placed and tightened.
Bilobed Transposition Flap Text: The defect edges were debeveled with a #15 scalpel blade.  Given the location of the defect and the proximity to free margins a bilobed transposition flap was deemed most appropriate.  Using a sterile surgical marker, an appropriate bilobe flap drawn around the defect.    The area thus outlined was incised deep to adipose tissue with a #15 scalpel blade.  The skin margins were undermined to an appropriate distance in all directions utilizing iris scissors.
Mastoid Interpolation Flap Text: A decision was made to reconstruct the defect utilizing an interpolation axial flap and a staged reconstruction.  A telfa template was made of the defect.  This telfa template was then used to outline the mastoid interpolation flap.  The donor area for the pedicle flap was then injected with anesthesia.  The flap was excised through the skin and subcutaneous tissue down to the layer of the underlying musculature.  The pedicle flap was carefully excised within this deep plane to maintain its blood supply.  The edges of the donor site were undermined.   The donor site was closed in a primary fashion.  The pedicle was then rotated into position and sutured.  Once the tube was sutured into place, adequate blood supply was confirmed with blanching and refill.  The pedicle was then wrapped with xeroform gauze and dressed appropriately with a telfa and gauze bandage to ensure continued blood supply and protect the attached pedicle.
Inflammation Suggestive Of Cancer Camouflage Histology Text: There was a dense lymphocytic infiltrate which prevented adequate histologic evaluation of adjacent structures.
Mohs Rapid Report Verbiage: The area of clinically evident tumor was marked with skin marking ink and appropriately hatched.  The initial incision was made following the Mohs approach through the skin.  The specimen was taken to the lab, divided into the necessary number of pieces, chromacoded and processed according to the Mohs protocol.  This was repeated in successive stages until a tumor free defect was achieved.
Star Wedge Flap Text: The defect edges were debeveled with a #15 scalpel blade.  Given the location of the defect, shape of the defect and the proximity to free margins a star wedge flap was deemed most appropriate.  Using a sterile surgical marker, an appropriate rotation flap was drawn incorporating the defect and placing the expected incisions within the relaxed skin tension lines where possible. The area thus outlined was incised deep to adipose tissue with a #15 scalpel blade.  The skin margins were undermined to an appropriate distance in all directions utilizing iris scissors.
Suturegard Retention Suture: 2-0 Nylon
Consent Type: Consent 1 (Standard)
Consent (Scalp)/Introductory Paragraph: The rationale for Mohs was explained to the patient and consent was obtained. The risks, benefits and alternatives to therapy were discussed in detail. Specifically, the risks of changes in hair growth pattern secondary to repair, infection, scarring, bleeding, prolonged wound healing, incomplete removal, allergy to anesthesia, nerve injury and recurrence were addressed. Prior to the procedure, the treatment site was clearly identified and confirmed by the patient. All components of Universal Protocol/PAUSE Rule completed.
Island Pedicle Flap With Canthal Suspension Text: The defect edges were debeveled with a #15 scalpel blade.  Given the location of the defect, shape of the defect and the proximity to free margins an island pedicle advancement flap was deemed most appropriate.  Using a sterile surgical marker, an appropriate advancement flap was drawn incorporating the defect, outlining the appropriate donor tissue and placing the expected incisions within the relaxed skin tension lines where possible. The area thus outlined was incised deep to adipose tissue with a #15 scalpel blade.  The skin margins were undermined to an appropriate distance in all directions around the primary defect and laterally outward around the island pedicle utilizing iris scissors.  There was minimal undermining beneath the pedicle flap. A suspension suture was placed in the canthal tendon to prevent tension and prevent ectropion.
Advancement Flap (Single) Text: The defect edges were debeveled with a #15 scalpel blade.  Given the location of the defect and the proximity to free margins a single advancement flap was deemed most appropriate.  Using a sterile surgical marker, an appropriate advancement flap was drawn incorporating the defect and placing the expected incisions within the relaxed skin tension lines where possible.    The area thus outlined was incised deep to adipose tissue with a #15 scalpel blade.  The skin margins were undermined to an appropriate distance in all directions utilizing iris scissors.
Tarsorrhaphy Text: A tarsorrhaphy was performed using Frost sutures.
Donor Site Anesthesia Type: same as repair anesthesia
Area M Indication Text: Tumors in this location are included in Area M (cheek, forehead, scalp, neck, jawline and pretibial skin).  Mohs surgery is indicated for tumors in these anatomic locations.

## 2019-06-20 ENCOUNTER — APPOINTMENT (RX ONLY)
Dept: URBAN - METROPOLITAN AREA CLINIC 4 | Facility: CLINIC | Age: 84
Setting detail: DERMATOLOGY
End: 2019-06-20

## 2019-06-20 DIAGNOSIS — Z48.02 ENCOUNTER FOR REMOVAL OF SUTURES: ICD-10-CM

## 2019-06-20 PROCEDURE — ? SUTURE REMOVAL (GLOBAL PERIOD)

## 2019-06-20 ASSESSMENT — LOCATION SIMPLE DESCRIPTION DERM: LOCATION SIMPLE: LEFT UPPER ARM

## 2019-06-20 ASSESSMENT — LOCATION ZONE DERM: LOCATION ZONE: ARM

## 2019-06-20 ASSESSMENT — LOCATION DETAILED DESCRIPTION DERM: LOCATION DETAILED: LEFT ANTERIOR PROXIMAL UPPER ARM

## 2019-06-20 NOTE — PROCEDURE: SUTURE REMOVAL (GLOBAL PERIOD)
Detail Level: Detailed
Add 04265 Cpt? (Important Note: In 2017 The Use Of 17917 Is Being Tracked By Cms To Determine Future Global Period Reimbursement For Global Periods): no

## 2019-06-25 ENCOUNTER — APPOINTMENT (RX ONLY)
Dept: URBAN - METROPOLITAN AREA CLINIC 36 | Facility: CLINIC | Age: 84
Setting detail: DERMATOLOGY
End: 2019-06-25

## 2019-06-25 DIAGNOSIS — Z48.02 ENCOUNTER FOR REMOVAL OF SUTURES: ICD-10-CM

## 2019-06-25 PROCEDURE — ? SUTURE REMOVAL (GLOBAL PERIOD)

## 2019-06-25 PROCEDURE — 99024 POSTOP FOLLOW-UP VISIT: CPT

## 2019-06-25 ASSESSMENT — LOCATION SIMPLE DESCRIPTION DERM: LOCATION SIMPLE: RIGHT ZYGOMA

## 2019-06-25 ASSESSMENT — LOCATION ZONE DERM: LOCATION ZONE: FACE

## 2019-06-25 ASSESSMENT — LOCATION DETAILED DESCRIPTION DERM: LOCATION DETAILED: RIGHT CENTRAL ZYGOMA

## 2019-06-25 NOTE — PROCEDURE: SUTURE REMOVAL (GLOBAL PERIOD)
Add 10435 Cpt? (Important Note: In 2017 The Use Of 18905 Is Being Tracked By Cms To Determine Future Global Period Reimbursement For Global Periods): yes
Detail Level: Detailed

## 2019-07-01 ENCOUNTER — APPOINTMENT (RX ONLY)
Dept: URBAN - METROPOLITAN AREA CLINIC 4 | Facility: CLINIC | Age: 84
Setting detail: DERMATOLOGY
End: 2019-07-01

## 2019-07-01 DIAGNOSIS — D485 NEOPLASM OF UNCERTAIN BEHAVIOR OF SKIN: ICD-10-CM

## 2019-07-01 PROBLEM — D48.5 NEOPLASM OF UNCERTAIN BEHAVIOR OF SKIN: Status: ACTIVE | Noted: 2019-07-01

## 2019-07-01 PROCEDURE — 12042 INTMD RPR N-HF/GENIT2.6-7.5: CPT | Mod: 79

## 2019-07-01 PROCEDURE — ? EXCISION

## 2019-07-01 PROCEDURE — 11421 EXC H-F-NK-SP B9+MARG 0.6-1: CPT | Mod: 79

## 2019-07-01 ASSESSMENT — LOCATION ZONE DERM: LOCATION ZONE: NECK

## 2019-07-01 ASSESSMENT — LOCATION DETAILED DESCRIPTION DERM: LOCATION DETAILED: LEFT POSTERIOR NECK

## 2019-07-01 ASSESSMENT — LOCATION SIMPLE DESCRIPTION DERM: LOCATION SIMPLE: POSTERIOR NECK

## 2019-07-01 NOTE — PROCEDURE: EXCISION
Detail Level: Detailed
Complex Repair And Rotation Flap Text: The defect edges were debeveled with a #15 scalpel blade.  The primary defect was closed partially with a complex linear closure.  Given the location of the remaining defect, shape of the defect and the proximity to free margins a rotation flap was deemed most appropriate for complete closure of the defect.  Using a sterile surgical marker, an appropriate advancement flap was drawn incorporating the defect and placing the expected incisions within the relaxed skin tension lines where possible.    The area thus outlined was incised deep to adipose tissue with a #15 scalpel blade.  The skin margins were undermined to an appropriate distance in all directions utilizing iris scissors.
Show Repair Surgeon Variable: Yes
Skin Substitute Units (Will Override Primary Defect Units If Greater Than 0): 0
Chonodrocutaneous Helical Advancement Flap Text: The defect edges were debeveled with a #15 scalpel blade.  Given the location of the defect and the proximity to free margins a chondrocutaneous helical advancement flap was deemed most appropriate.  Using a sterile surgical marker, the appropriate advancement flap was drawn incorporating the defect and placing the expected incisions within the relaxed skin tension lines where possible.    The area thus outlined was incised deep to adipose tissue with a #15 scalpel blade.  The skin margins were undermined to an appropriate distance in all directions utilizing iris scissors.
Billing Type: Third-Party Bill
Lazy S Complex Repair Preamble Text (Leave Blank If You Do Not Want): Extensive wide undermining was performed.
Dermal Autograft Text: The defect edges were debeveled with a #15 scalpel blade.  Given the location of the defect, shape of the defect and the proximity to free margins a dermal autograft was deemed most appropriate.  Using a sterile surgical marker, the primary defect shape was transferred to the donor site. The area thus outlined was incised deep to adipose tissue with a #15 scalpel blade.  The harvested graft was then trimmed of adipose and epidermal tissue until only dermis was left.  The skin graft was then placed in the primary defect and oriented appropriately.
Patient Will Remove Sutures At Home?: No
Rhombic Flap Text: The defect edges were debeveled with a #15 scalpel blade.  Given the location of the defect and the proximity to free margins a rhombic flap was deemed most appropriate.  Using a sterile surgical marker, an appropriate rhombic flap was drawn incorporating the defect.    The area thus outlined was incised deep to adipose tissue with a #15 scalpel blade.  The skin margins were undermined to an appropriate distance in all directions utilizing iris scissors.
Burow's Advancement Flap Text: The defect edges were debeveled with a #15 scalpel blade.  Given the location of the defect and the proximity to free margins a Burow's advancement flap was deemed most appropriate.  Using a sterile surgical marker, the appropriate advancement flap was drawn incorporating the defect and placing the expected incisions within the relaxed skin tension lines where possible.    The area thus outlined was incised deep to adipose tissue with a #15 scalpel blade.  The skin margins were undermined to an appropriate distance in all directions utilizing iris scissors.
Anesthesia Volume In Cc: 6
Double O-Z Plasty Text: The defect edges were debeveled with a #15 scalpel blade.  Given the location of the defect, shape of the defect and the proximity to free margins a Double O-Z plasty (double transposition flap) was deemed most appropriate.  Using a sterile surgical marker, the appropriate transposition flaps were drawn incorporating the defect and placing the expected incisions within the relaxed skin tension lines where possible. The area thus outlined was incised deep to adipose tissue with a #15 scalpel blade.  The skin margins were undermined to an appropriate distance in all directions utilizing iris scissors.  Hemostasis was achieved with electrocautery.  The flaps were then transposed into place, one clockwise and the other counterclockwise, and anchored with interrupted buried subcutaneous sutures.
Crescentic Advancement Flap Text: The defect edges were debeveled with a #15 scalpel blade.  Given the location of the defect and the proximity to free margins a crescentic advancement flap was deemed most appropriate.  Using a sterile surgical marker, the appropriate advancement flap was drawn incorporating the defect and placing the expected incisions within the relaxed skin tension lines where possible.    The area thus outlined was incised deep to adipose tissue with a #15 scalpel blade.  The skin margins were undermined to an appropriate distance in all directions utilizing iris scissors.
Skin Substitute Text: The defect edges were debeveled with a #15 scalpel blade.  Given the location of the defect, shape of the defect and the proximity to free margins a skin substitute graft was deemed most appropriate.  The graft material was trimmed to fit the size of the defect. The graft was then placed in the primary defect and oriented appropriately.
V-Y Plasty Text: The defect edges were debeveled with a #15 scalpel blade.  Given the location of the defect, shape of the defect and the proximity to free margins an V-Y advancement flap was deemed most appropriate.  Using a sterile surgical marker, an appropriate advancement flap was drawn incorporating the defect and placing the expected incisions within the relaxed skin tension lines where possible.    The area thus outlined was incised deep to adipose tissue with a #15 scalpel blade.  The skin margins were undermined to an appropriate distance in all directions utilizing iris scissors.
Bi-Rhombic Flap Text: The defect edges were debeveled with a #15 scalpel blade.  Given the location of the defect and the proximity to free margins a bi-rhombic flap was deemed most appropriate.  Using a sterile surgical marker, an appropriate rhombic flap was drawn incorporating the defect. The area thus outlined was incised deep to adipose tissue with a #15 scalpel blade.  The skin margins were undermined to an appropriate distance in all directions utilizing iris scissors.
Excision Method: Elliptical
S Plasty Text: Given the location and shape of the defect, and the orientation of relaxed skin tension lines, an S-plasty was deemed most appropriate for repair.  Using a sterile surgical marker, the appropriate outline of the S-plasty was drawn, incorporating the defect and placing the expected incisions within the relaxed skin tension lines where possible.  The area thus outlined was incised deep to adipose tissue with a #15 scalpel blade.  The skin margins were undermined to an appropriate distance in all directions utilizing iris scissors. The skin flaps were advanced over the defect.  The opposing margins were then approximated with interrupted buried subcutaneous sutures.
Consent was obtained from the patient. The risks and benefits to therapy were discussed in detail. Specifically, the risks of infection, scarring, bleeding, prolonged wound healing, incomplete removal, allergy to anesthesia, nerve injury and recurrence were addressed. Prior to the procedure, the treatment site was clearly identified and confirmed by the patient. All components of Universal Protocol/PAUSE Rule completed.
Rhomboid Transposition Flap Text: The defect edges were debeveled with a #15 scalpel blade.  Given the location of the defect and the proximity to free margins a rhomboid transposition flap was deemed most appropriate.  Using a sterile surgical marker, an appropriate rhomboid flap was drawn incorporating the defect.    The area thus outlined was incised deep to adipose tissue with a #15 scalpel blade.  The skin margins were undermined to an appropriate distance in all directions utilizing iris scissors.
Complex Repair And Rhombic Flap Text: The defect edges were debeveled with a #15 scalpel blade.  The primary defect was closed partially with a complex linear closure.  Given the location of the remaining defect, shape of the defect and the proximity to free margins a rhombic flap was deemed most appropriate for complete closure of the defect.  Using a sterile surgical marker, an appropriate advancement flap was drawn incorporating the defect and placing the expected incisions within the relaxed skin tension lines where possible.    The area thus outlined was incised deep to adipose tissue with a #15 scalpel blade.  The skin margins were undermined to an appropriate distance in all directions utilizing iris scissors.
Helical Rim Advancement Flap Text: The defect edges were debeveled with a #15 blade scalpel.  Given the location of the defect and the proximity to free margins (helical rim) a double helical rim advancement flap was deemed most appropriate.  Using a sterile surgical marker, the appropriate advancement flaps were drawn incorporating the defect and placing the expected incisions between the helical rim and antihelix where possible.  The area thus outlined was incised through and through with a #15 scalpel blade.  With a skin hook and iris scissors, the flaps were gently and sharply undermined and freed up.
Repair Type: Intermediate
A-T Advancement Flap Text: The defect edges were debeveled with a #15 scalpel blade.  Given the location of the defect, shape of the defect and the proximity to free margins an A-T advancement flap was deemed most appropriate.  Using a sterile surgical marker, an appropriate advancement flap was drawn incorporating the defect and placing the expected incisions within the relaxed skin tension lines where possible.    The area thus outlined was incised deep to adipose tissue with a #15 scalpel blade.  The skin margins were undermined to an appropriate distance in all directions utilizing iris scissors.
Medical Necessity Clause: This procedure was medically necessary because the lesion that was treated was:
H Plasty Text: Given the location of the defect, shape of the defect and the proximity to free margins a H-plasty was deemed most appropriate for repair.  Using a sterile surgical marker, the appropriate advancement arms of the H-plasty were drawn incorporating the defect and placing the expected incisions within the relaxed skin tension lines where possible. The area thus outlined was incised deep to adipose tissue with a #15 scalpel blade. The skin margins were undermined to an appropriate distance in all directions utilizing iris scissors.  The opposing advancement arms were then advanced into place in opposite direction and anchored with interrupted buried subcutaneous sutures.
Complex Repair And Transposition Flap Text: The defect edges were debeveled with a #15 scalpel blade.  The primary defect was closed partially with a complex linear closure.  Given the location of the remaining defect, shape of the defect and the proximity to free margins a transposition flap was deemed most appropriate for complete closure of the defect.  Using a sterile surgical marker, an appropriate advancement flap was drawn incorporating the defect and placing the expected incisions within the relaxed skin tension lines where possible.    The area thus outlined was incised deep to adipose tissue with a #15 scalpel blade.  The skin margins were undermined to an appropriate distance in all directions utilizing iris scissors.
Excision Depth: adipose tissue
Tissue Cultured Epidermal Autograft Text: The defect edges were debeveled with a #15 scalpel blade.  Given the location of the defect, shape of the defect and the proximity to free margins a tissue cultured epidermal autograft was deemed most appropriate.  The graft was then trimmed to fit the size of the defect.  The graft was then placed in the primary defect and oriented appropriately.
W Plasty Text: The lesion was extirpated to the level of the fat with a #15 scalpel blade.  Given the location of the defect, shape of the defect and the proximity to free margins a W-plasty was deemed most appropriate for repair.  Using a sterile surgical marker, the appropriate transposition arms of the W-plasty were drawn incorporating the defect and placing the expected incisions within the relaxed skin tension lines where possible.    The area thus outlined was incised deep to adipose tissue with a #15 scalpel blade.  The skin margins were undermined to an appropriate distance in all directions utilizing iris scissors.  The opposing transposition arms were then transposed into place in opposite direction and anchored with interrupted buried subcutaneous sutures.
Lab: [id number]
Bilateral Helical Rim Advancement Flap Text: The defect edges were debeveled with a #15 blade scalpel.  Given the location of the defect and the proximity to free margins (helical rim) a bilateral helical rim advancement flap was deemed most appropriate.  Using a sterile surgical marker, the appropriate advancement flaps were drawn incorporating the defect and placing the expected incisions between the helical rim and antihelix where possible.  The area thus outlined was incised through and through with a #15 scalpel blade.  With a skin hook and iris scissors, the flaps were gently and sharply undermined and freed up.
Number Of Epidermal Sutures (Optional): 7
Complex Repair And V-Y Plasty Text: The defect edges were debeveled with a #15 scalpel blade.  The primary defect was closed partially with a complex linear closure.  Given the location of the remaining defect, shape of the defect and the proximity to free margins a V-Y plasty was deemed most appropriate for complete closure of the defect.  Using a sterile surgical marker, an appropriate advancement flap was drawn incorporating the defect and placing the expected incisions within the relaxed skin tension lines where possible.    The area thus outlined was incised deep to adipose tissue with a #15 scalpel blade.  The skin margins were undermined to an appropriate distance in all directions utilizing iris scissors.
O-T Advancement Flap Text: The defect edges were debeveled with a #15 scalpel blade.  Given the location of the defect, shape of the defect and the proximity to free margins an O-T advancement flap was deemed most appropriate.  Using a sterile surgical marker, an appropriate advancement flap was drawn incorporating the defect and placing the expected incisions within the relaxed skin tension lines where possible.    The area thus outlined was incised deep to adipose tissue with a #15 scalpel blade.  The skin margins were undermined to an appropriate distance in all directions utilizing iris scissors.
Xenograft Text: The defect edges were debeveled with a #15 scalpel blade.  Given the location of the defect, shape of the defect and the proximity to free margins a xenograft was deemed most appropriate.  The graft was then trimmed to fit the size of the defect.  The graft was then placed in the primary defect and oriented appropriately.
Crescentic Intermediate Repair Preamble Text (Leave Blank If You Do Not Want): Undermining was performed with blunt dissection.
Intermediate / Complex Repair - Final Wound Length In Cm: 5.3
Home Suture Removal Text: Patient was provided a home suture removal kit and will remove their sutures at home.  If they have any questions or difficulties they will call the office.
Epidermal Closure: simple interrupted
Purse String (Intermediate) Text: Given the location of the defect and the characteristics of the surrounding skin a purse string intermediate closure was deemed most appropriate.  Undermining was performed circumferentially around the surgical defect.  A purse string suture was then placed and tightened.
O-L Flap Text: The defect edges were debeveled with a #15 scalpel blade.  Given the location of the defect, shape of the defect and the proximity to free margins an O-L flap was deemed most appropriate.  Using a sterile surgical marker, an appropriate advancement flap was drawn incorporating the defect and placing the expected incisions within the relaxed skin tension lines where possible.    The area thus outlined was incised deep to adipose tissue with a #15 scalpel blade.  The skin margins were undermined to an appropriate distance in all directions utilizing iris scissors.
Post-Care Instructions: I reviewed with the patient in detail post-care instructions. Patient is not to engage in any heavy lifting, exercise, or swimming for the next 14 days. Should the patient develop any fevers, chills, bleeding, severe pain patient will contact the office immediately.
Z Plasty Text: The lesion was extirpated to the level of the fat with a #15 scalpel blade.  Given the location of the defect, shape of the defect and the proximity to free margins a Z-plasty was deemed most appropriate for repair.  Using a sterile surgical marker, the appropriate transposition arms of the Z-plasty were drawn incorporating the defect and placing the expected incisions within the relaxed skin tension lines where possible.    The area thus outlined was incised deep to adipose tissue with a #15 scalpel blade.  The skin margins were undermined to an appropriate distance in all directions utilizing iris scissors.  The opposing transposition arms were then transposed into place in opposite direction and anchored with interrupted buried subcutaneous sutures.
Lab Facility: 
Scalpel Size: 15 blade
Complex Repair And M Plasty Text: The defect edges were debeveled with a #15 scalpel blade.  The primary defect was closed partially with a complex linear closure.  Given the location of the remaining defect, shape of the defect and the proximity to free margins an M plasty was deemed most appropriate for complete closure of the defect.  Using a sterile surgical marker, an appropriate advancement flap was drawn incorporating the defect and placing the expected incisions within the relaxed skin tension lines where possible.    The area thus outlined was incised deep to adipose tissue with a #15 scalpel blade.  The skin margins were undermined to an appropriate distance in all directions utilizing iris scissors.
Ear Star Wedge Flap Text: The defect edges were debeveled with a #15 blade scalpel.  Given the location of the defect and the proximity to free margins (helical rim) an ear star wedge flap was deemed most appropriate.  Using a sterile surgical marker, the appropriate flap was drawn incorporating the defect and placing the expected incisions between the helical rim and antihelix where possible.  The area thus outlined was incised through and through with a #15 scalpel blade.
Bilobed Flap Text: The defect edges were debeveled with a #15 scalpel blade.  Given the location of the defect and the proximity to free margins a bilobe flap was deemed most appropriate.  Using a sterile surgical marker, an appropriate bilobe flap drawn around the defect.    The area thus outlined was incised deep to adipose tissue with a #15 scalpel blade.  The skin margins were undermined to an appropriate distance in all directions utilizing iris scissors.
Complex Repair And W Plasty Text: The defect edges were debeveled with a #15 scalpel blade.  The primary defect was closed partially with a complex linear closure.  Given the location of the remaining defect, shape of the defect and the proximity to free margins a W plasty was deemed most appropriate for complete closure of the defect.  Using a sterile surgical marker, an appropriate advancement flap was drawn incorporating the defect and placing the expected incisions within the relaxed skin tension lines where possible.    The area thus outlined was incised deep to adipose tissue with a #15 scalpel blade.  The skin margins were undermined to an appropriate distance in all directions utilizing iris scissors.
Double O-Z Flap Text: The defect edges were debeveled with a #15 scalpel blade.  Given the location of the defect, shape of the defect and the proximity to free margins a Double O-Z flap was deemed most appropriate.  Using a sterile surgical marker, an appropriate transposition flap was drawn incorporating the defect and placing the expected incisions within the relaxed skin tension lines where possible. The area thus outlined was incised deep to adipose tissue with a #15 scalpel blade.  The skin margins were undermined to an appropriate distance in all directions utilizing iris scissors.
Banner Transposition Flap Text: The defect edges were debeveled with a #15 scalpel blade.  Given the location of the defect and the proximity to free margins a Banner transposition flap was deemed most appropriate.  Using a sterile surgical marker, an appropriate flap drawn around the defect. The area thus outlined was incised deep to adipose tissue with a #15 scalpel blade.  The skin margins were undermined to an appropriate distance in all directions utilizing iris scissors.
O-Z Flap Text: The defect edges were debeveled with a #15 scalpel blade.  Given the location of the defect, shape of the defect and the proximity to free margins an O-Z flap was deemed most appropriate.  Using a sterile surgical marker, an appropriate transposition flap was drawn incorporating the defect and placing the expected incisions within the relaxed skin tension lines where possible. The area thus outlined was incised deep to adipose tissue with a #15 scalpel blade.  The skin margins were undermined to an appropriate distance in all directions utilizing iris scissors.
Complex Repair And Double M Plasty Text: The defect edges were debeveled with a #15 scalpel blade.  The primary defect was closed partially with a complex linear closure.  Given the location of the remaining defect, shape of the defect and the proximity to free margins a double M plasty was deemed most appropriate for complete closure of the defect.  Using a sterile surgical marker, an appropriate advancement flap was drawn incorporating the defect and placing the expected incisions within the relaxed skin tension lines where possible.    The area thus outlined was incised deep to adipose tissue with a #15 scalpel blade.  The skin margins were undermined to an appropriate distance in all directions utilizing iris scissors.
Anesthesia Type: 1% lidocaine with epinephrine
Cheek Interpolation Flap Text: A decision was made to reconstruct the defect utilizing an interpolation axial flap and a staged reconstruction.  A telfa template was made of the defect.  This telfa template was then used to outline the Cheek Interpolation flap.  The donor area for the pedicle flap was then injected with anesthesia.  The flap was excised through the skin and subcutaneous tissue down to the layer of the underlying musculature.  The interpolation flap was carefully excised within this deep plane to maintain its blood supply.  The edges of the donor site were undermined.   The donor site was closed in a primary fashion.  The pedicle was then rotated into position and sutured.  Once the tube was sutured into place, adequate blood supply was confirmed with blanching and refill.  The pedicle was then wrapped with xeroform gauze and dressed appropriately with a telfa and gauze bandage to ensure continued blood supply and protect the attached pedicle.
V-Y Flap Text: The defect edges were debeveled with a #15 scalpel blade.  Given the location of the defect, shape of the defect and the proximity to free margins a V-Y flap was deemed most appropriate.  Using a sterile surgical marker, an appropriate advancement flap was drawn incorporating the defect and placing the expected incisions within the relaxed skin tension lines where possible.    The area thus outlined was incised deep to adipose tissue with a #15 scalpel blade.  The skin margins were undermined to an appropriate distance in all directions utilizing iris scissors.
Cheek-To-Nose Interpolation Flap Text: A decision was made to reconstruct the defect utilizing an interpolation axial flap and a staged reconstruction.  A telfa template was made of the defect.  This telfa template was then used to outline the Cheek-To-Nose Interpolation flap.  The donor area for the pedicle flap was then injected with anesthesia.  The flap was excised through the skin and subcutaneous tissue down to the layer of the underlying musculature.  The interpolation flap was carefully excised within this deep plane to maintain its blood supply.  The edges of the donor site were undermined.   The donor site was closed in a primary fashion.  The pedicle was then rotated into position and sutured.  Once the tube was sutured into place, adequate blood supply was confirmed with blanching and refill.  The pedicle was then wrapped with xeroform gauze and dressed appropriately with a telfa and gauze bandage to ensure continued blood supply and protect the attached pedicle.
Fusiform Excision Additional Text (Leave Blank If You Do Not Want): The margin was drawn around the clinically apparent lesion.  A fusiform shape was then drawn on the skin incorporating the lesion and margins.  Incisions were then made along these lines to the appropriate tissue plane and the lesion was extirpated.
Complex Repair And Z Plasty Text: The defect edges were debeveled with a #15 scalpel blade.  The primary defect was closed partially with a complex linear closure.  Given the location of the remaining defect, shape of the defect and the proximity to free margins a Z plasty was deemed most appropriate for complete closure of the defect.  Using a sterile surgical marker, an appropriate advancement flap was drawn incorporating the defect and placing the expected incisions within the relaxed skin tension lines where possible.    The area thus outlined was incised deep to adipose tissue with a #15 scalpel blade.  The skin margins were undermined to an appropriate distance in all directions utilizing iris scissors.
Bilobed Transposition Flap Text: The defect edges were debeveled with a #15 scalpel blade.  Given the location of the defect and the proximity to free margins a bilobed transposition flap was deemed most appropriate.  Using a sterile surgical marker, an appropriate bilobe flap drawn around the defect.    The area thus outlined was incised deep to adipose tissue with a #15 scalpel blade.  The skin margins were undermined to an appropriate distance in all directions utilizing iris scissors.
Trilobed Flap Text: The defect edges were debeveled with a #15 scalpel blade.  Given the location of the defect and the proximity to free margins a trilobed flap was deemed most appropriate.  Using a sterile surgical marker, an appropriate trilobed flap drawn around the defect.    The area thus outlined was incised deep to adipose tissue with a #15 scalpel blade.  The skin margins were undermined to an appropriate distance in all directions utilizing iris scissors.
Mercedes Flap Text: The defect edges were debeveled with a #15 scalpel blade.  Given the location of the defect, shape of the defect and the proximity to free margins a Mercedes flap was deemed most appropriate.  Using a sterile surgical marker, an appropriate advancement flap was drawn incorporating the defect and placing the expected incisions within the relaxed skin tension lines where possible. The area thus outlined was incised deep to adipose tissue with a #15 scalpel blade.  The skin margins were undermined to an appropriate distance in all directions utilizing iris scissors.
Complex Repair And Dorsal Nasal Flap Text: The defect edges were debeveled with a #15 scalpel blade.  The primary defect was closed partially with a complex linear closure.  Given the location of the remaining defect, shape of the defect and the proximity to free margins a dorsal nasal flap was deemed most appropriate for complete closure of the defect.  Using a sterile surgical marker, an appropriate flap was drawn incorporating the defect and placing the expected incisions within the relaxed skin tension lines where possible.    The area thus outlined was incised deep to adipose tissue with a #15 scalpel blade.  The skin margins were undermined to an appropriate distance in all directions utilizing iris scissors.
Eliptical Excision Additional Text (Leave Blank If You Do Not Want): The margin was drawn around the clinically apparent lesion.  An elliptical shape was then drawn on the skin incorporating the lesion and margins.  Incisions were then made along these lines to the appropriate tissue plane and the lesion was extirpated.
Interpolation Flap Text: A decision was made to reconstruct the defect utilizing an interpolation axial flap and a staged reconstruction.  A telfa template was made of the defect.  This telfa template was then used to outline the interpolation flap.  The donor area for the pedicle flap was then injected with anesthesia.  The flap was excised through the skin and subcutaneous tissue down to the layer of the underlying musculature.  The interpolation flap was carefully excised within this deep plane to maintain its blood supply.  The edges of the donor site were undermined.   The donor site was closed in a primary fashion.  The pedicle was then rotated into position and sutured.  Once the tube was sutured into place, adequate blood supply was confirmed with blanching and refill.  The pedicle was then wrapped with xeroform gauze and dressed appropriately with a telfa and gauze bandage to ensure continued blood supply and protect the attached pedicle.
Mastoid Interpolation Flap Text: A decision was made to reconstruct the defect utilizing an interpolation axial flap and a staged reconstruction.  A telfa template was made of the defect.  This telfa template was then used to outline the mastoid interpolation flap.  The donor area for the pedicle flap was then injected with anesthesia.  The flap was excised through the skin and subcutaneous tissue down to the layer of the underlying musculature.  The pedicle flap was carefully excised within this deep plane to maintain its blood supply.  The edges of the donor site were undermined.   The donor site was closed in a primary fashion.  The pedicle was then rotated into position and sutured.  Once the tube was sutured into place, adequate blood supply was confirmed with blanching and refill.  The pedicle was then wrapped with xeroform gauze and dressed appropriately with a telfa and gauze bandage to ensure continued blood supply and protect the attached pedicle.
Melolabial Interpolation Flap Text: A decision was made to reconstruct the defect utilizing an interpolation axial flap and a staged reconstruction.  A telfa template was made of the defect.  This telfa template was then used to outline the melolabial interpolation flap.  The donor area for the pedicle flap was then injected with anesthesia.  The flap was excised through the skin and subcutaneous tissue down to the layer of the underlying musculature.  The pedicle flap was carefully excised within this deep plane to maintain its blood supply.  The edges of the donor site were undermined.   The donor site was closed in a primary fashion.  The pedicle was then rotated into position and sutured.  Once the tube was sutured into place, adequate blood supply was confirmed with blanching and refill.  The pedicle was then wrapped with xeroform gauze and dressed appropriately with a telfa and gauze bandage to ensure continued blood supply and protect the attached pedicle.
Saucerization Excision Additional Text (Leave Blank If You Do Not Want): The margin was drawn around the clinically apparent lesion.  Incisions were then made along these lines, in a tangential fashion, to the appropriate tissue plane and the lesion was extirpated.
Wound Care: Petrolatum
Complex Repair And Ftsg Text: The defect edges were debeveled with a #15 scalpel blade.  The primary defect was closed partially with a complex linear closure.  Given the location of the defect, shape of the defect and the proximity to free margins a full thickness skin graft was deemed most appropriate to repair the remaining defect.  The graft was trimmed to fit the size of the remaining defect.  The graft was then placed in the primary defect, oriented appropriately, and sutured into place.
Dorsal Nasal Flap Text: The defect edges were debeveled with a #15 scalpel blade.  Given the location of the defect and the proximity to free margins a dorsal nasal flap was deemed most appropriate.  Using a sterile surgical marker, an appropriate dorsal nasal flap was drawn around the defect.    The area thus outlined was incised deep to adipose tissue with a #15 scalpel blade.  The skin margins were undermined to an appropriate distance in all directions utilizing iris scissors.
Modified Advancement Flap Text: The defect edges were debeveled with a #15 scalpel blade.  Given the location of the defect, shape of the defect and the proximity to free margins a modified advancement flap was deemed most appropriate.  Using a sterile surgical marker, an appropriate advancement flap was drawn incorporating the defect and placing the expected incisions within the relaxed skin tension lines where possible.    The area thus outlined was incised deep to adipose tissue with a #15 scalpel blade.  The skin margins were undermined to an appropriate distance in all directions utilizing iris scissors.
Deep Sutures: 4-0 Polysorb
Excisional Biopsy Additional Text (Leave Blank If You Do Not Want): The margin was drawn around the clinically apparent lesion. An elliptical shape was then drawn on the skin incorporating the lesion and margins.  Incisions were then made along these lines to the appropriate tissue plane and the lesion was extirpated.
Complex Repair And Single Advancement Flap Text: The defect edges were debeveled with a #15 scalpel blade.  The primary defect was closed partially with a complex linear closure.  Given the location of the remaining defect, shape of the defect and the proximity to free margins a single advancement flap was deemed most appropriate for complete closure of the defect.  Using a sterile surgical marker, an appropriate advancement flap was drawn incorporating the defect and placing the expected incisions within the relaxed skin tension lines where possible.    The area thus outlined was incised deep to adipose tissue with a #15 scalpel blade.  The skin margins were undermined to an appropriate distance in all directions utilizing iris scissors.
Dressing: pressure dressing
Posterior Auricular Interpolation Flap Text: A decision was made to reconstruct the defect utilizing an interpolation axial flap and a staged reconstruction.  A telfa template was made of the defect.  This telfa template was then used to outline the posterior auricular interpolation flap.  The donor area for the pedicle flap was then injected with anesthesia.  The flap was excised through the skin and subcutaneous tissue down to the layer of the underlying musculature.  The pedicle flap was carefully excised within this deep plane to maintain its blood supply.  The edges of the donor site were undermined.   The donor site was closed in a primary fashion.  The pedicle was then rotated into position and sutured.  Once the tube was sutured into place, adequate blood supply was confirmed with blanching and refill.  The pedicle was then wrapped with xeroform gauze and dressed appropriately with a telfa and gauze bandage to ensure continued blood supply and protect the attached pedicle.
Mucosal Advancement Flap Text: Given the location of the defect, shape of the defect and the proximity to free margins a mucosal advancement flap was deemed most appropriate. Incisions were made with a 15 blade scalpel in the appropriate fashion along the cutaneous vermillion border and the mucosal lip. The remaining actinically damaged mucosal tissue was excised.  The mucosal advancement flap was then elevated to the gingival sulcus with care taken to preserve the neurovascular structures and advanced into the primary defect. Care was taken to ensure that precise realignment of the vermilion border was achieved.
Complex Repair And Split-Thickness Skin Graft Text: The defect edges were debeveled with a #15 scalpel blade.  The primary defect was closed partially with a complex linear closure.  Given the location of the defect, shape of the defect and the proximity to free margins a split thickness skin graft was deemed most appropriate to repair the remaining defect.  The graft was trimmed to fit the size of the remaining defect.  The graft was then placed in the primary defect, oriented appropriately, and sutured into place.
Slit Excision Additional Text (Leave Blank If You Do Not Want): A linear line was drawn on the skin overlying the lesion. An incision was made slowly until the lesion was visualized.  Once visualized, the lesion was removed with blunt dissection.
Purse String (Simple) Text: Given the location of the defect and the characteristics of the surrounding skin a purse string simple closure was deemed most appropriate.  Undermining was performed circumferentially around the surgical defect.  A purse string suture was then placed and tightened.
Island Pedicle Flap Text: The defect edges were debeveled with a #15 scalpel blade.  Given the location of the defect, shape of the defect and the proximity to free margins an island pedicle advancement flap was deemed most appropriate.  Using a sterile surgical marker, an appropriate advancement flap was drawn incorporating the defect, outlining the appropriate donor tissue and placing the expected incisions within the relaxed skin tension lines where possible.    The area thus outlined was incised deep to adipose tissue with a #15 scalpel blade.  The skin margins were undermined to an appropriate distance in all directions around the primary defect and laterally outward around the island pedicle utilizing iris scissors.  There was minimal undermining beneath the pedicle flap.
Perilesional Excision Additional Text (Leave Blank If You Do Not Want): The margin was drawn around the clinically apparent lesion. Incisions were then made along these lines to the appropriate tissue plane and the lesion was extirpated.
Paramedian Forehead Flap Text: A decision was made to reconstruct the defect utilizing an interpolation axial flap and a staged reconstruction.  A telfa template was made of the defect.  This telfa template was then used to outline the paramedian forehead pedicle flap.  The donor area for the pedicle flap was then injected with anesthesia.  The flap was excised through the skin and subcutaneous tissue down to the layer of the underlying musculature.  The pedicle flap was carefully excised within this deep plane to maintain its blood supply.  The edges of the donor site were undermined.   The donor site was closed in a primary fashion.  The pedicle was then rotated into position and sutured.  Once the tube was sutured into place, adequate blood supply was confirmed with blanching and refill.  The pedicle was then wrapped with xeroform gauze and dressed appropriately with a telfa and gauze bandage to ensure continued blood supply and protect the attached pedicle.
Island Pedicle Flap With Canthal Suspension Text: The defect edges were debeveled with a #15 scalpel blade.  Given the location of the defect, shape of the defect and the proximity to free margins an island pedicle advancement flap was deemed most appropriate.  Using a sterile surgical marker, an appropriate advancement flap was drawn incorporating the defect, outlining the appropriate donor tissue and placing the expected incisions within the relaxed skin tension lines where possible. The area thus outlined was incised deep to adipose tissue with a #15 scalpel blade.  The skin margins were undermined to an appropriate distance in all directions around the primary defect and laterally outward around the island pedicle utilizing iris scissors.  There was minimal undermining beneath the pedicle flap. A suspension suture was placed in the canthal tendon to prevent tension and prevent ectropion.
Hemostasis: Electrocautery
Complex Repair And Dermal Autograft Text: The defect edges were debeveled with a #15 scalpel blade.  The primary defect was closed partially with a complex linear closure.  Given the location of the defect, shape of the defect and the proximity to free margins an dermal autograft was deemed most appropriate to repair the remaining defect.  The graft was trimmed to fit the size of the remaining defect.  The graft was then placed in the primary defect, oriented appropriately, and sutured into place.
Complex Repair And Double Advancement Flap Text: The defect edges were debeveled with a #15 scalpel blade.  The primary defect was closed partially with a complex linear closure.  Given the location of the remaining defect, shape of the defect and the proximity to free margins a double advancement flap was deemed most appropriate for complete closure of the defect.  Using a sterile surgical marker, an appropriate advancement flap was drawn incorporating the defect and placing the expected incisions within the relaxed skin tension lines where possible.    The area thus outlined was incised deep to adipose tissue with a #15 scalpel blade.  The skin margins were undermined to an appropriate distance in all directions utilizing iris scissors.
Rotation Flap Text: The defect edges were debeveled with a #15 scalpel blade.  Given the location of the defect, shape of the defect and the proximity to free margins a rotation flap was deemed most appropriate.  Using a sterile surgical marker, an appropriate rotation flap was drawn incorporating the defect and placing the expected incisions within the relaxed skin tension lines where possible.    The area thus outlined was incised deep to adipose tissue with a #15 scalpel blade.  The skin margins were undermined to an appropriate distance in all directions utilizing iris scissors.
Medical Necessity Information: It is in your best interest to select a reason for this procedure from the list below. All of these items fulfill various CMS LCD requirements except lesion extends to a margin.
Hatchet Flap Text: The defect edges were debeveled with a #15 scalpel blade.  Given the location of the defect, shape of the defect and the proximity to free margins a hatchet flap was deemed most appropriate.  Using a sterile surgical marker, an appropriate hatchet flap was drawn incorporating the defect and placing the expected incisions within the relaxed skin tension lines where possible.    The area thus outlined was incised deep to adipose tissue with a #15 scalpel blade.  The skin margins were undermined to an appropriate distance in all directions utilizing iris scissors.
Complex Repair And Epidermal Autograft Text: The defect edges were debeveled with a #15 scalpel blade.  The primary defect was closed partially with a complex linear closure.  Given the location of the defect, shape of the defect and the proximity to free margins an epidermal autograft was deemed most appropriate to repair the remaining defect.  The graft was trimmed to fit the size of the remaining defect.  The graft was then placed in the primary defect, oriented appropriately, and sutured into place.
Spiral Flap Text: The defect edges were debeveled with a #15 scalpel blade.  Given the location of the defect, shape of the defect and the proximity to free margins a spiral flap was deemed most appropriate.  Using a sterile surgical marker, an appropriate rotation flap was drawn incorporating the defect and placing the expected incisions within the relaxed skin tension lines where possible. The area thus outlined was incised deep to adipose tissue with a #15 scalpel blade.  The skin margins were undermined to an appropriate distance in all directions utilizing iris scissors.
Complex Repair And Tissue Cultured Epidermal Autograft Text: The defect edges were debeveled with a #15 scalpel blade.  The primary defect was closed partially with a complex linear closure.  Given the location of the defect, shape of the defect and the proximity to free margins an tissue cultured epidermal autograft was deemed most appropriate to repair the remaining defect.  The graft was trimmed to fit the size of the remaining defect.  The graft was then placed in the primary defect, oriented appropriately, and sutured into place.
Complex Repair And Modified Advancement Flap Text: The defect edges were debeveled with a #15 scalpel blade.  The primary defect was closed partially with a complex linear closure.  Given the location of the remaining defect, shape of the defect and the proximity to free margins a modified advancement flap was deemed most appropriate for complete closure of the defect.  Using a sterile surgical marker, an appropriate advancement flap was drawn incorporating the defect and placing the expected incisions within the relaxed skin tension lines where possible.    The area thus outlined was incised deep to adipose tissue with a #15 scalpel blade.  The skin margins were undermined to an appropriate distance in all directions utilizing iris scissors.
Lip Wedge Excision Repair Text: Given the location of the defect and the proximity to free margins a full thickness wedge repair was deemed most appropriate.  Using a sterile surgical marker, the appropriate repair was drawn incorporating the defect and placing the expected incisions perpendicular to the vermilion border.  The vermilion border was also meticulously outlined to ensure appropriate reapproximation during the repair.  The area thus outlined was incised through and through with a #15 scalpel blade.  The muscularis and dermis were reaproximated with deep sutures following hemostasis. Care was taken to realign the vermilion border before proceeding with the superficial closure.  Once the vermilion was realigned the superfical and mucosal closure was finished.
Alar Island Pedicle Flap Text: The defect edges were debeveled with a #15 scalpel blade.  Given the location of the defect, shape of the defect and the proximity to the alar rim an island pedicle advancement flap was deemed most appropriate.  Using a sterile surgical marker, an appropriate advancement flap was drawn incorporating the defect, outlining the appropriate donor tissue and placing the expected incisions within the nasal ala running parallel to the alar rim. The area thus outlined was incised with a #15 scalpel blade.  The skin margins were undermined minimally to an appropriate distance in all directions around the primary defect and laterally outward around the island pedicle utilizing iris scissors.  There was minimal undermining beneath the pedicle flap.
Complex Repair And Xenograft Text: The defect edges were debeveled with a #15 scalpel blade.  The primary defect was closed partially with a complex linear closure.  Given the location of the defect, shape of the defect and the proximity to free margins a xenograft was deemed most appropriate to repair the remaining defect.  The graft was trimmed to fit the size of the remaining defect.  The graft was then placed in the primary defect, oriented appropriately, and sutured into place.
Number Of Deep Sutures (Optional): 4
Double Island Pedicle Flap Text: The defect edges were debeveled with a #15 scalpel blade.  Given the location of the defect, shape of the defect and the proximity to free margins a double island pedicle advancement flap was deemed most appropriate.  Using a sterile surgical marker, an appropriate advancement flap was drawn incorporating the defect, outlining the appropriate donor tissue and placing the expected incisions within the relaxed skin tension lines where possible.    The area thus outlined was incised deep to adipose tissue with a #15 scalpel blade.  The skin margins were undermined to an appropriate distance in all directions around the primary defect and laterally outward around the island pedicle utilizing iris scissors.  There was minimal undermining beneath the pedicle flap.
Star Wedge Flap Text: The defect edges were debeveled with a #15 scalpel blade.  Given the location of the defect, shape of the defect and the proximity to free margins a star wedge flap was deemed most appropriate.  Using a sterile surgical marker, an appropriate rotation flap was drawn incorporating the defect and placing the expected incisions within the relaxed skin tension lines where possible. The area thus outlined was incised deep to adipose tissue with a #15 scalpel blade.  The skin margins were undermined to an appropriate distance in all directions utilizing iris scissors.
Complex Repair And A-T Advancement Flap Text: The defect edges were debeveled with a #15 scalpel blade.  The primary defect was closed partially with a complex linear closure.  Given the location of the remaining defect, shape of the defect and the proximity to free margins an A-T advancement flap was deemed most appropriate for complete closure of the defect.  Using a sterile surgical marker, an appropriate advancement flap was drawn incorporating the defect and placing the expected incisions within the relaxed skin tension lines where possible.    The area thus outlined was incised deep to adipose tissue with a #15 scalpel blade.  The skin margins were undermined to an appropriate distance in all directions utilizing iris scissors.
Repair Performed By Another Provider Text (Leave Blank If You Do Not Want): After the tissue was excised the defect was repaired by another provider.
Ftsg Text: The defect edges were debeveled with a #15 scalpel blade.  Given the location of the defect, shape of the defect and the proximity to free margins a full thickness skin graft was deemed most appropriate.  Using a sterile surgical marker, the primary defect shape was transferred to the donor site. The area thus outlined was incised deep to adipose tissue with a #15 scalpel blade.  The harvested graft was then trimmed of adipose tissue until only dermis and epidermis was left.  The skin margins of the secondary defect were undermined to an appropriate distance in all directions utilizing iris scissors.  The secondary defect was closed with interrupted buried subcutaneous sutures.  The skin edges were then re-apposed with running  sutures.  The skin graft was then placed in the primary defect and oriented appropriately.
Transposition Flap Text: The defect edges were debeveled with a #15 scalpel blade.  Given the location of the defect and the proximity to free margins a transposition flap was deemed most appropriate.  Using a sterile surgical marker, an appropriate transposition flap was drawn incorporating the defect.    The area thus outlined was incised deep to adipose tissue with a #15 scalpel blade.  The skin margins were undermined to an appropriate distance in all directions utilizing iris scissors.
Cartilage Graft Text: The defect edges were debeveled with a #15 scalpel blade.  Given the location of the defect, shape of the defect, the fact the defect involved a full thickness cartilage defect a cartilage graft was deemed most appropriate.  An appropriate donor site was identified, cleansed, and anesthetized. The cartilage graft was then harvested and transferred to the recipient site, oriented appropriately and then sutured into place.  The secondary defect was then repaired using a primary closure.
Size Of Lesion In Cm: 0.5
Keystone Flap Text: The defect edges were debeveled with a #15 scalpel blade.  Given the location of the defect, shape of the defect a keystone flap was deemed most appropriate.  Using a sterile surgical marker, an appropriate keystone flap was drawn incorporating the defect, outlining the appropriate donor tissue and placing the expected incisions within the relaxed skin tension lines where possible. The area thus outlined was incised deep to adipose tissue with a #15 scalpel blade.  The skin margins were undermined to an appropriate distance in all directions around the primary defect and laterally outward around the flap utilizing iris scissors.
Split-Thickness Skin Graft Text: The defect edges were debeveled with a #15 scalpel blade.  Given the location of the defect, shape of the defect and the proximity to free margins a split thickness skin graft was deemed most appropriate.  Using a sterile surgical marker, the primary defect shape was transferred to the donor site. The split thickness graft was then harvested.  The skin graft was then placed in the primary defect and oriented appropriately.
No Repair - Repaired With Adjacent Surgical Defect Text (Leave Blank If You Do Not Want): After the excision the defect was repaired concurrently with another surgical defect which was in close approximation.
Island Pedicle Flap-Requiring Vessel Identification Text: The defect edges were debeveled with a #15 scalpel blade.  Given the location of the defect, shape of the defect and the proximity to free margins an island pedicle advancement flap was deemed most appropriate.  Using a sterile surgical marker, an appropriate advancement flap was drawn, based on the axial vessel mentioned above, incorporating the defect, outlining the appropriate donor tissue and placing the expected incisions within the relaxed skin tension lines where possible.    The area thus outlined was incised deep to adipose tissue with a #15 scalpel blade.  The skin margins were undermined to an appropriate distance in all directions around the primary defect and laterally outward around the island pedicle utilizing iris scissors.  There was minimal undermining beneath the pedicle flap.
Complex Repair And Skin Substitute Graft Text: The defect edges were debeveled with a #15 scalpel blade.  The primary defect was closed partially with a complex linear closure.  Given the location of the remaining defect, shape of the defect and the proximity to free margins a skin substitute graft was deemed most appropriate to repair the remaining defect.  The graft was trimmed to fit the size of the remaining defect.  The graft was then placed in the primary defect, oriented appropriately, and sutured into place.
Complex Repair And O-T Advancement Flap Text: The defect edges were debeveled with a #15 scalpel blade.  The primary defect was closed partially with a complex linear closure.  Given the location of the remaining defect, shape of the defect and the proximity to free margins an O-T advancement flap was deemed most appropriate for complete closure of the defect.  Using a sterile surgical marker, an appropriate advancement flap was drawn incorporating the defect and placing the expected incisions within the relaxed skin tension lines where possible.    The area thus outlined was incised deep to adipose tissue with a #15 scalpel blade.  The skin margins were undermined to an appropriate distance in all directions utilizing iris scissors.
O-T Plasty Text: The defect edges were debeveled with a #15 scalpel blade.  Given the location of the defect, shape of the defect and the proximity to free margins an O-T plasty was deemed most appropriate.  Using a sterile surgical marker, an appropriate O-T plasty was drawn incorporating the defect and placing the expected incisions within the relaxed skin tension lines where possible.    The area thus outlined was incised deep to adipose tissue with a #15 scalpel blade.  The skin margins were undermined to an appropriate distance in all directions utilizing iris scissors.
Path Notes (To The Dermatopathologist): Please check margins.
Muscle Hinge Flap Text: The defect edges were debeveled with a #15 scalpel blade.  Given the size, depth and location of the defect and the proximity to free margins a muscle hinge flap was deemed most appropriate.  Using a sterile surgical marker, an appropriate hinge flap was drawn incorporating the defect. The area thus outlined was incised with a #15 scalpel blade.  The skin margins were undermined to an appropriate distance in all directions utilizing iris scissors.
Estimated Blood Loss (Cc): minimal
Complex Repair And Bilobe Flap Text: The defect edges were debeveled with a #15 scalpel blade.  The primary defect was closed partially with a complex linear closure.  Given the location of the remaining defect, shape of the defect and the proximity to free margins a bilobe flap was deemed most appropriate for complete closure of the defect.  Using a sterile surgical marker, an appropriate advancement flap was drawn incorporating the defect and placing the expected incisions within the relaxed skin tension lines where possible.    The area thus outlined was incised deep to adipose tissue with a #15 scalpel blade.  The skin margins were undermined to an appropriate distance in all directions utilizing iris scissors.
Advancement Flap (Double) Text: The defect edges were debeveled with a #15 scalpel blade.  Given the location of the defect and the proximity to free margins a double advancement flap was deemed most appropriate.  Using a sterile surgical marker, the appropriate advancement flaps were drawn incorporating the defect and placing the expected incisions within the relaxed skin tension lines where possible.    The area thus outlined was incised deep to adipose tissue with a #15 scalpel blade.  The skin margins were undermined to an appropriate distance in all directions utilizing iris scissors.
Composite Graft Text: The defect edges were debeveled with a #15 scalpel blade.  Given the location of the defect, shape of the defect, the proximity to free margins and the fact the defect was full thickness a composite graft was deemed most appropriate.  The defect was outline and then transferred to the donor site.  A full thickness graft was then excised from the donor site. The graft was then placed in the primary defect, oriented appropriately and then sutured into place.  The secondary defect was then repaired using a primary closure.
Advancement Flap (Single) Text: The defect edges were debeveled with a #15 scalpel blade.  Given the location of the defect and the proximity to free margins a single advancement flap was deemed most appropriate.  Using a sterile surgical marker, an appropriate advancement flap was drawn incorporating the defect and placing the expected incisions within the relaxed skin tension lines where possible.    The area thus outlined was incised deep to adipose tissue with a #15 scalpel blade.  The skin margins were undermined to an appropriate distance in all directions utilizing iris scissors.
Complex Repair And O-L Flap Text: The defect edges were debeveled with a #15 scalpel blade.  The primary defect was closed partially with a complex linear closure.  Given the location of the remaining defect, shape of the defect and the proximity to free margins an O-L flap was deemed most appropriate for complete closure of the defect.  Using a sterile surgical marker, an appropriate flap was drawn incorporating the defect and placing the expected incisions within the relaxed skin tension lines where possible.    The area thus outlined was incised deep to adipose tissue with a #15 scalpel blade.  The skin margins were undermined to an appropriate distance in all directions utilizing iris scissors.
Complex Repair And Melolabial Flap Text: The defect edges were debeveled with a #15 scalpel blade.  The primary defect was closed partially with a complex linear closure.  Given the location of the remaining defect, shape of the defect and the proximity to free margins a melolabial flap was deemed most appropriate for complete closure of the defect.  Using a sterile surgical marker, an appropriate advancement flap was drawn incorporating the defect and placing the expected incisions within the relaxed skin tension lines where possible.    The area thus outlined was incised deep to adipose tissue with a #15 scalpel blade.  The skin margins were undermined to an appropriate distance in all directions utilizing iris scissors.
Size Of Margin In Cm: 0.2
Information: Selecting Yes will display possible errors in your note based on the variables you have selected. This validation is only offered as a suggestion for you. PLEASE NOTE THAT THE VALIDATION TEXT WILL BE REMOVED WHEN YOU FINALIZE YOUR NOTE. IF YOU WANT TO FAX A PRELIMINARY NOTE YOU WILL NEED TO TOGGLE THIS TO 'NO' IF YOU DO NOT WANT IT IN YOUR FAXED NOTE.
Epidermal Autograft Text: The defect edges were debeveled with a #15 scalpel blade.  Given the location of the defect, shape of the defect and the proximity to free margins an epidermal autograft was deemed most appropriate.  Using a sterile surgical marker, the primary defect shape was transferred to the donor site. The epidermal graft was then harvested.  The skin graft was then placed in the primary defect and oriented appropriately.
Graft Donor Site Bandage (Optional-Leave Blank If You Don't Want In Note): Steri-strips and a pressure bandage were applied to the donor site.
Epidermal Sutures: 4-0 Nylon
Suture Removal: 14 days
O-Z Plasty Text: The defect edges were debeveled with a #15 scalpel blade.  Given the location of the defect, shape of the defect and the proximity to free margins an O-Z plasty (double transposition flap) was deemed most appropriate.  Using a sterile surgical marker, the appropriate transposition flaps were drawn incorporating the defect and placing the expected incisions within the relaxed skin tension lines where possible.    The area thus outlined was incised deep to adipose tissue with a #15 scalpel blade.  The skin margins were undermined to an appropriate distance in all directions utilizing iris scissors.  Hemostasis was achieved with electrocautery.  The flaps were then transposed into place, one clockwise and the other counterclockwise, and anchored with interrupted buried subcutaneous sutures.
Melolabial Transposition Flap Text: The defect edges were debeveled with a #15 scalpel blade.  Given the location of the defect and the proximity to free margins a melolabial flap was deemed most appropriate.  Using a sterile surgical marker, an appropriate melolabial transposition flap was drawn incorporating the defect.    The area thus outlined was incised deep to adipose tissue with a #15 scalpel blade.  The skin margins were undermined to an appropriate distance in all directions utilizing iris scissors.

## 2019-07-15 ENCOUNTER — APPOINTMENT (RX ONLY)
Dept: URBAN - METROPOLITAN AREA CLINIC 4 | Facility: CLINIC | Age: 84
Setting detail: DERMATOLOGY
End: 2019-07-15

## 2019-07-15 DIAGNOSIS — Z48.02 ENCOUNTER FOR REMOVAL OF SUTURES: ICD-10-CM

## 2019-07-15 PROCEDURE — ? SUTURE REMOVAL (GLOBAL PERIOD)

## 2019-07-15 ASSESSMENT — LOCATION SIMPLE DESCRIPTION DERM: LOCATION SIMPLE: POSTERIOR NECK

## 2019-07-15 ASSESSMENT — LOCATION ZONE DERM: LOCATION ZONE: NECK

## 2019-07-15 ASSESSMENT — LOCATION DETAILED DESCRIPTION DERM: LOCATION DETAILED: LEFT INFERIOR POSTERIOR NECK

## 2019-07-15 NOTE — PROCEDURE: SUTURE REMOVAL (GLOBAL PERIOD)
Add 31799 Cpt? (Important Note: In 2017 The Use Of 53463 Is Being Tracked By Cms To Determine Future Global Period Reimbursement For Global Periods): no
Detail Level: Detailed

## 2019-07-22 ENCOUNTER — APPOINTMENT (RX ONLY)
Dept: URBAN - METROPOLITAN AREA CLINIC 36 | Facility: CLINIC | Age: 84
Setting detail: DERMATOLOGY
End: 2019-07-22

## 2019-07-22 PROBLEM — C44.319 BASAL CELL CARCINOMA OF SKIN OF OTHER PARTS OF FACE: Status: ACTIVE | Noted: 2019-07-22

## 2019-07-22 PROCEDURE — 17311 MOHS 1 STAGE H/N/HF/G: CPT

## 2019-07-22 PROCEDURE — ? MOHS SURGERY

## 2019-07-22 PROCEDURE — 13132 CMPLX RPR F/C/C/M/N/AX/G/H/F: CPT

## 2019-07-22 PROCEDURE — 17312 MOHS ADDL STAGE: CPT

## 2019-07-22 NOTE — PROCEDURE: MOHS SURGERY
Stage 4: Additional Anesthesia Volume In Cc: 0
Include Size Of Lesion In Location Indication Statement: Yes
Initial Size Of Lesion: 1
Complex/Intermediate Repair Variations: Crescentic
Closure 4 Information: This tab is for additional flaps and grafts above and beyond our usual structured repairs.  Please note if you enter information here it will not currently bill and you will need to add the billing information manually.
Stage 14: Additional Anesthesia Type: 1% lidocaine with epinephrine
Split-Thickness Skin Graft Text: The defect edges were debeveled with a #15 scalpel blade.  Given the location of the defect, shape of the defect and the proximity to free margins a split thickness skin graft was deemed most appropriate.  Using a sterile surgical marker, the primary defect shape was transferred to the donor site. The split thickness graft was then harvested.  The skin graft was then placed in the primary defect and oriented appropriately.
Bilateral Helical Rim Advancement Flap Text: The defect edges were debeveled with a #15 blade scalpel.  Given the location of the defect and the proximity to free margins (helical rim) a bilateral helical rim advancement flap was deemed most appropriate.  Using a sterile surgical marker, the appropriate advancement flaps were drawn incorporating the defect and placing the expected incisions between the helical rim and antihelix where possible.  The area thus outlined was incised through and through with a #15 scalpel blade.  With a skin hook and iris scissors, the flaps were gently and sharply undermined and freed up.
M-Plasty Intermediate Repair Preamble Text (Leave Blank If You Do Not Want): Undermining was performed with blunt dissection.
Consent (Temporal Branch)/Introductory Paragraph: The rationale for Mohs was explained to the patient and consent was obtained. The risks, benefits and alternatives to therapy were discussed in detail. Specifically, the risks of damage to the temporal branch of the facial nerve, infection, scarring, bleeding, prolonged wound healing, incomplete removal, allergy to anesthesia, and recurrence were addressed. Prior to the procedure, the treatment site was clearly identified and confirmed by the patient. All components of Universal Protocol/PAUSE Rule completed.
Unique Flap 2 Text: A decision was made to reconstruct the defect utilizing a Peng Flap (Bilateral Advancement Rotation Flap). Given the location of the defect and the proximity to free margins, this flap was deemed most appropriate.  Using a sterile surgical marker, the appropriate rotation flaps were drawn incorporating the defect and placing the expected incisions within the relaxed skin tension lines where possible.    The area thus outlined was incised deep to adipose tissue with a #15 scalpel blade.  The skin margins were undermined to an appropriate distance in all directions utilizing iris scissors.
Mid-Level Procedure Text (C): After obtaining clear surgical margins the patient was sent to a mid-level provider for surgical repair.  The patient understands they will receive post-surgical care and follow-up from the mid-level provider.
Double O-Z Flap Text: The defect edges were debeveled with a #15 scalpel blade.  Given the location of the defect, shape of the defect and the proximity to free margins a Double O-Z flap was deemed most appropriate.  Using a sterile surgical marker, an appropriate transposition flap was drawn incorporating the defect and placing the expected incisions within the relaxed skin tension lines where possible. The area thus outlined was incised deep to adipose tissue with a #15 scalpel blade.  The skin margins were undermined to an appropriate distance in all directions utilizing iris scissors.
S Plasty Text: Given the location and shape of the defect, and the orientation of relaxed skin tension lines, an S-plasty was deemed most appropriate for repair.  Using a sterile surgical marker, the appropriate outline of the S-plasty was drawn, incorporating the defect and placing the expected incisions within the relaxed skin tension lines where possible.  The area thus outlined was incised deep to adipose tissue with a #15 scalpel blade.  The skin margins were undermined to an appropriate distance in all directions utilizing iris scissors. The skin flaps were advanced over the defect.  The opposing margins were then approximated with interrupted buried subcutaneous sutures.
Quadrant Reporting?: no
Stage 2: Additional Anesthesia Type: 1% lidocaine with 1:100,000 epinephrine and 408mcg clindamycin/ml and a 1:10 solution of 8.4% sodium bicarbonate
Dermal Autograft Text: The defect edges were debeveled with a #15 scalpel blade.  Given the location of the defect, shape of the defect and the proximity to free margins a dermal autograft was deemed most appropriate.  Using a sterile surgical marker, the primary defect shape was transferred to the donor site. The area thus outlined was incised deep to adipose tissue with a #15 scalpel blade.  The harvested graft was then trimmed of adipose and epidermal tissue until only dermis was left.  The skin graft was then placed in the primary defect and oriented appropriately.
Bilobed Transposition Flap Text: The defect edges were debeveled with a #15 scalpel blade.  Given the location of the defect and the proximity to free margins a bilobed transposition flap was deemed most appropriate.  Using a sterile surgical marker, an appropriate bilobe flap drawn around the defect.    The area thus outlined was incised deep to adipose tissue with a #15 scalpel blade.  The skin margins were undermined to an appropriate distance in all directions utilizing iris scissors.
Primary Defect Width In Cm (Final Defect Size - Required For Flaps/Grafts): 1.2
Secondary Intention Text (Leave Blank If You Do Not Want): The defect will heal with secondary intention.
Unna Boot Text: An Unna boot was placed to help immobilize the limb and facilitate more rapid healing.
Consent (Ear)/Introductory Paragraph: The rationale for Mohs was explained to the patient and consent was obtained. The risks, benefits and alternatives to therapy were discussed in detail. Specifically, the risks of ear deformity, infection, scarring, bleeding, prolonged wound healing, incomplete removal, allergy to anesthesia, nerve injury and recurrence were addressed. Prior to the procedure, the treatment site was clearly identified and confirmed by the patient. All components of Universal Protocol/PAUSE Rule completed.
Asc Procedure Text (C): After obtaining clear surgical margins the patient was sent to an ASC for surgical repair.  The patient understands they will receive post-surgical care and follow-up from the ASC physician.
Z Plasty Text: The lesion was extirpated to the level of the fat with a #15 scalpel blade.  Given the location of the defect, shape of the defect and the proximity to free margins a Z-plasty was deemed most appropriate for repair.  Using a sterile surgical marker, the appropriate transposition arms of the Z-plasty were drawn incorporating the defect and placing the expected incisions within the relaxed skin tension lines where possible.    The area thus outlined was incised deep to adipose tissue with a #15 scalpel blade.  The skin margins were undermined to an appropriate distance in all directions utilizing iris scissors.  The opposing transposition arms were then transposed into place in opposite direction and anchored with interrupted buried subcutaneous sutures.
Mohs Case Number: [id number]
Mohs Histo Method Verbiage: Each section was then chromacoded and processed in the Mohs lab using the Mohs protocol and submitted for frozen section.
Modified Advancement Flap Text: The defect edges were debeveled with a #15 scalpel blade.  Given the location of the defect, shape of the defect and the proximity to free margins a modified advancement flap was deemed most appropriate.  Using a sterile surgical marker, an appropriate advancement flap was drawn incorporating the defect and placing the expected incisions within the relaxed skin tension lines where possible.    The area thus outlined was incised deep to adipose tissue with a #15 scalpel blade.  The skin margins were undermined to an appropriate distance in all directions utilizing iris scissors.
Plastic Surgeon Procedure Text (D): After obtaining clear surgical margins the patient was sent to plastics for surgical repair.  The patient understands they will receive post-surgical care and follow-up from the referring physician's office.
Island Pedicle Flap With Canthal Suspension Text: The defect edges were debeveled with a #15 scalpel blade.  Given the location of the defect, shape of the defect and the proximity to free margins an island pedicle advancement flap was deemed most appropriate.  Using a sterile surgical marker, an appropriate advancement flap was drawn incorporating the defect, outlining the appropriate donor tissue and placing the expected incisions within the relaxed skin tension lines where possible. The area thus outlined was incised deep to adipose tissue with a #15 scalpel blade.  The skin margins were undermined to an appropriate distance in all directions around the primary defect and laterally outward around the island pedicle utilizing iris scissors.  There was minimal undermining beneath the pedicle flap. A suspension suture was placed in the canthal tendon to prevent tension and prevent ectropion.
Post-Care Instructions: I reviewed with the patient in detail post-care instructions. Patient is not to engage in any heavy lifting, exercise, or swimming for the next 14 days. Should the patient develop any fevers, chills, bleeding, severe pain patient will contact the office immediately.
Purse String (Simple) Text: Given the location of the defect and the characteristics of the surrounding skin a purse string closure was deemed most appropriate.  Undermining was performed circumfirentially around the surgical defect.  A purse string suture was then placed and tightened.
Oculoplastic Surgeon (A): Minh
Purse String (Intermediate) Text: Given the location of the defect and the characteristics of the surrounding skin a purse string intermediate closure was deemed most appropriate.  Undermining was performed circumfirentially around the surgical defect.  A purse string suture was then placed and tightened.
Provider Procedure Text (A): After obtaining clear surgical margins the defect was repaired by another provider.
Alar Island Pedicle Flap Text: The defect edges were debeveled with a #15 scalpel blade.  Given the location of the defect, shape of the defect and the proximity to the alar rim an island pedicle advancement flap was deemed most appropriate.  Using a sterile surgical marker, an appropriate advancement flap was drawn incorporating the defect, outlining the appropriate donor tissue and placing the expected incisions within the nasal ala running parallel to the alar rim. The area thus outlined was incised with a #15 scalpel blade.  The skin margins were undermined minimally to an appropriate distance in all directions around the primary defect and laterally outward around the island pedicle utilizing iris scissors.  There was minimal undermining beneath the pedicle flap.
Inflammation Suggestive Of Cancer Camouflage Histology Text: There was a dense lymphocytic infiltrate which prevented adequate histologic evaluation of adjacent structures.
Mastoid Interpolation Flap Text: A decision was made to reconstruct the defect utilizing an interpolation axial flap and a staged reconstruction.  A telfa template was made of the defect.  This telfa template was then used to outline the mastoid interpolation flap.  The donor area for the pedicle flap was then injected with anesthesia.  The flap was excised through the skin and subcutaneous tissue down to the layer of the underlying musculature.  The pedicle flap was carefully excised within this deep plane to maintain its blood supply.  The edges of the donor site were undermined.   The donor site was closed in a primary fashion.  The pedicle was then rotated into position and sutured.  Once the tube was sutured into place, adequate blood supply was confirmed with blanching and refill.  The pedicle was then wrapped with xeroform gauze and dressed appropriately with a telfa and gauze bandage to ensure continued blood supply and protect the attached pedicle.
Otolaryngologist Procedure Text (B): After obtaining clear surgical margins the patient was sent to otolaryngology for surgical repair.  The patient understands they will receive post-surgical care and follow-up from the referring physician's office.
Star Wedge Flap Text: The defect edges were debeveled with a #15 scalpel blade.  Given the location of the defect, shape of the defect and the proximity to free margins a star wedge flap was deemed most appropriate.  Using a sterile surgical marker, an appropriate rotation flap was drawn incorporating the defect and placing the expected incisions within the relaxed skin tension lines where possible. The area thus outlined was incised deep to adipose tissue with a #15 scalpel blade.  The skin margins were undermined to an appropriate distance in all directions utilizing iris scissors.
Complex Repair Preamble Text (Leave Blank If You Do Not Want): Extensive wide undermining was performed at least 2 cm in all directions.
Repair Anesthesia Method: local infiltration
Tarsorrhaphy Text: A tarsorrhaphy was performed using Frost sutures.
O-T Plasty Text: The defect edges were debeveled with a #15 scalpel blade.  Given the location of the defect, shape of the defect and the proximity to free margins an O-T plasty was deemed most appropriate.  Using a sterile surgical marker, an appropriate O-T plasty was drawn incorporating the defect and placing the expected incisions within the relaxed skin tension lines where possible.    The area thus outlined was incised deep to adipose tissue with a #15 scalpel blade.  The skin margins were undermined to an appropriate distance in all directions utilizing iris scissors.
Advancement Flap (Double) Text: The defect edges were debeveled with a #15 scalpel blade.  Given the location of the defect and the proximity to free margins a double advancement flap was deemed most appropriate.  Using a sterile surgical marker, the appropriate advancement flaps were drawn incorporating the defect and placing the expected incisions within the relaxed skin tension lines where possible.    The area thus outlined was incised deep to adipose tissue with a #15 scalpel blade.  The skin margins were undermined to an appropriate distance in all directions utilizing iris scissors.
Rhombic Flap Text: The defect edges were debeveled with a #15 scalpel blade.  Given the location of the defect and the proximity to free margins a rhombic flap was deemed most appropriate.  Using a sterile surgical marker, an appropriate rhombic flap was drawn incorporating the defect.    The area thus outlined was incised deep to adipose tissue with a #15 scalpel blade.  The skin margins were undermined to an appropriate distance in all directions utilizing iris scissors.
M-Plasty Complex Repair Preamble Text (Leave Blank If You Do Not Want): Extensive wide undermining was performed.
Dressing (No Sutures): dry sterile dressing
Cheiloplasty (Complex) Text: A decision was made to reconstruct the defect with a  cheiloplasty.  The defect was undermined extensively.  Additional obicularis oris muscle was excised with a 15 blade scalpel.  The defect was converted into a full thickness wedge to facilite a better cosmetic result.  Small vessels were then tied off with 5-0 monocyrl. The obicularis oris, superficial fascia, adipose and dermis were then reapproximated.  After the deeper layers were approximated the epidermis was reapproximated with particular care given to realign the vermilion border.
Estimated Blood Loss (Cc): less than 5 cc
Alternatives Discussed Intro (Do Not Add Period): I discussed alternative treatments to Mohs surgery and specifically discussed the risks and benefits of
Anesthesia Volume In Cc: 6
Oculoplastic Surgeon Procedure Text (C): After obtaining clear surgical margins the patient was sent to oculoplastics for surgical repair.  The patient understands they will receive post-surgical care and follow-up from the referring physician's office.
Unique Flap 2 Name: Peng Flap
A-T Advancement Flap Text: The defect edges were debeveled with a #15 scalpel blade.  Given the location of the defect, shape of the defect and the proximity to free margins an A-T advancement flap was deemed most appropriate.  Using a sterile surgical marker, an appropriate advancement flap was drawn incorporating the defect and placing the expected incisions within the relaxed skin tension lines where possible.    The area thus outlined was incised deep to adipose tissue with a #15 scalpel blade.  The skin margins were undermined to an appropriate distance in all directions utilizing iris scissors.
O-T Advancement Flap Text: The defect edges were debeveled with a #15 scalpel blade.  Given the location of the defect, shape of the defect and the proximity to free margins an O-T advancement flap was deemed most appropriate.  Using a sterile surgical marker, an appropriate advancement flap was drawn incorporating the defect and placing the expected incisions within the relaxed skin tension lines where possible.    The area thus outlined was incised deep to adipose tissue with a #15 scalpel blade.  The skin margins were undermined to an appropriate distance in all directions utilizing iris scissors.
Cartilage Graft Text: The defect edges were debeveled with a #15 scalpel blade.  Given the location of the defect, shape of the defect, the fact the defect involved a full thickness cartilage defect a cartilage graft was deemed most appropriate.  An appropriate donor site was identified, cleansed, and anesthetized. The cartilage graft was then harvested and transferred to the recipient site, oriented appropriately and then sutured into place.  The secondary defect was then repaired using a primary closure.
Ear Star Wedge Flap Text: The defect edges were debeveled with a #15 blade scalpel.  Given the location of the defect and the proximity to free margins (helical rim) an ear star wedge flap was deemed most appropriate.  Using a sterile surgical marker, the appropriate flap was drawn incorporating the defect and placing the expected incisions between the helical rim and antihelix where possible.  The area thus outlined was incised through and through with a #15 scalpel blade.
X Size Of Lesion In Cm (Optional): 0.7
Epidermal Closure Graft Donor Site (Optional): simple interrupted
Consent (Marginal Mandibular)/Introductory Paragraph: The rationale for Mohs was explained to the patient and consent was obtained. The risks, benefits and alternatives to therapy were discussed in detail. Specifically, the risks of damage to the marginal mandibular branch of the facial nerve, infection, scarring, bleeding, prolonged wound healing, incomplete removal, allergy to anesthesia, and recurrence were addressed. Prior to the procedure, the treatment site was clearly identified and confirmed by the patient. All components of Universal Protocol/PAUSE Rule completed.
V-Y Plasty Text: The defect edges were debeveled with a #15 scalpel blade.  Given the location of the defect, shape of the defect and the proximity to free margins an V-Y advancement flap was deemed most appropriate.  Using a sterile surgical marker, an appropriate advancement flap was drawn incorporating the defect and placing the expected incisions within the relaxed skin tension lines where possible.    The area thus outlined was incised deep to adipose tissue with a #15 scalpel blade.  The skin margins were undermined to an appropriate distance in all directions utilizing iris scissors.
Unique Flap 3 Text: The defect edges were debeveled with a #15 scalpel blade.  Given the location of the defect, shape of the defect and the proximity to free margins a Mercedes (double advancement flap) was deemed most appropriate.  Using a sterile surgical marker, the appropriate transposition flaps were drawn incorporating the defect and placing the expected incisions within the relaxed skin tension lines where possible.    The area thus outlined was incised deep to adipose tissue with a #15 scalpel blade.  The skin margins were undermined to an appropriate distance in all directions utilizing iris scissors.  Hemostasis was achieved with electrocautery.  The flaps were then advanced into the defect and anchored with interrupted buried subcutaneous sutures.
Eye Protection Verbiage: Before proceeding with the stage, a plastic scleral shield was inserted. The globe was anesthetized with a few drops of 1% lidocaine with 1:100,000 epinephrine. Then, an appropriate sized scleral shield was chosen and coated with lacrilube ointment. The shield was gently inserted and left in place for the duration of each stage. After the stage was completed, the shield was gently removed.
V-Y Flap Text: The defect edges were debeveled with a #15 scalpel blade.  Given the location of the defect, shape of the defect and the proximity to free margins a V-Y flap was deemed most appropriate.  Using a sterile surgical marker, an appropriate advancement flap was drawn incorporating the defect and placing the expected incisions within the relaxed skin tension lines where possible.    The area thus outlined was incised deep to adipose tissue with a #15 scalpel blade.  The skin margins were undermined to an appropriate distance in all directions utilizing iris scissors.
Repair Type: Complex Repair
Trilobed Flap Text: The defect edges were debeveled with a #15 scalpel blade.  Given the location of the defect and the proximity to free margins a trilobed flap was deemed most appropriate.  Using a sterile surgical marker, an appropriate trilobed flap drawn around the defect.    The area thus outlined was incised deep to adipose tissue with a #15 scalpel blade.  The skin margins were undermined to an appropriate distance in all directions utilizing iris scissors.
Home Suture Removal Text: Patient was provided instructions on removing sutures and will remove their sutures at home.  If they have any questions or difficulties they will call the office.
No Repair - Repaired With Adjacent Surgical Defect Text (Leave Blank If You Do Not Want): After obtaining clear surgical margins the defect was repaired concurrently with another surgical defect which was in close approximation.
Skin Substitute Text: The defect edges were debeveled with a #15 scalpel blade.  Given the location of the defect, shape of the defect and the proximity to free margins a skin substitute graft was deemed most appropriate.  The graft material was trimmed to fit the size of the defect. The graft was then placed in the primary defect and oriented appropriately.
Consent (Nose)/Introductory Paragraph: The rationale for Mohs was explained to the patient and consent was obtained. The risks, benefits and alternatives to therapy were discussed in detail. Specifically, the risks of nasal deformity, changes in the flow of air through the nose, infection, scarring, bleeding, prolonged wound healing, incomplete removal, allergy to anesthesia, nerve injury and recurrence were addressed. Prior to the procedure, the treatment site was clearly identified and confirmed by the patient. All components of Universal Protocol/PAUSE Rule completed.
Mucosal Advancement Flap Text: Given the location of the defect, shape of the defect and the proximity to free margins a mucosal advancement flap was deemed most appropriate. Incisions were made with a 15 blade scalpel in the appropriate fashion along the cutaneous vermilion border and the mucosal lip. The remaining actinically damaged mucosal tissue was excised.  The mucosal advancement flap was then elevated to the gingival sulcus with care taken to preserve the neurovascular structures and advanced into the primary defect. Care was taken to ensure that precise realignment of the vermilion border was achieved.
Cheek Interpolation Flap Text: A decision was made to reconstruct the defect utilizing an interpolation axial flap and a staged reconstruction.  A telfa template was made of the defect.  This telfa template was then used to outline the Cheek Interpolation flap.  The donor area for the pedicle flap was then injected with anesthesia.  The flap was excised through the skin and subcutaneous tissue down to the layer of the underlying musculature.  The interpolation flap was carefully excised within this deep plane to maintain its blood supply.  The edges of the donor site were undermined.   The donor site was closed in a primary fashion.  The pedicle was then rotated into position and sutured.  Once the tube was sutured into place, adequate blood supply was confirmed with blanching and refill.  The pedicle was then wrapped with xeroform gauze and dressed appropriately with a telfa and gauze bandage to ensure continued blood supply and protect the attached pedicle.
Suturegard Intro: Intraoperative tissue expansion was performed, utilizing the SUTUREGARD device, in order to reduce wound tension.
Anesthesia Type: 0.5% lidocaine with 1:200,000 epinephrine and a 1:10 solution of 8.4% sodium bicarbonate and 408mcg clindamycin/ml
Suturegard Body: The suture ends were repeatedly re-tightened and re-clamped to achieve the desired tissue expansion.
Area H Indication Text: Tumors in this location are included in Area H (eyelids, eyebrows, nose, lips, chin, ear, pre-auricular, post-auricular, temple, genitalia, hands, feet, ankles and areola).  Tissue conservation is critical in these anatomic locations.
Partial Purse String (Simple) Text: Given the location of the defect and the characteristics of the surrounding skin a simple purse string closure was deemed most appropriate.  Undermining was performed circumfirentially around the surgical defect.  A purse string suture was then placed and tightened. Wound tension only allowed a partial closure of the circular defect.
Double Island Pedicle Flap Text: The defect edges were debeveled with a #15 scalpel blade.  Given the location of the defect, shape of the defect and the proximity to free margins a double island pedicle advancement flap was deemed most appropriate.  Using a sterile surgical marker, an appropriate advancement flap was drawn incorporating the defect, outlining the appropriate donor tissue and placing the expected incisions within the relaxed skin tension lines where possible.    The area thus outlined was incised deep to adipose tissue with a #15 scalpel blade.  The skin margins were undermined to an appropriate distance in all directions around the primary defect and laterally outward around the island pedicle utilizing iris scissors.  There was minimal undermining beneath the pedicle flap.
Suturegard Retention Suture: 2-0 Nylon
Transposition Flap Text: The defect edges were debeveled with a #15 scalpel blade.  Given the location of the defect and the proximity to free margins a transposition flap was deemed most appropriate.  Using a sterile surgical marker, an appropriate transposition flap was drawn incorporating the defect.    The area thus outlined was incised deep to adipose tissue with a #15 scalpel blade.  The skin margins were undermined to an appropriate distance in all directions utilizing iris scissors.
Graft Basting Suture (Optional): 5-0 Fast Absorbing Gut
Consent Type: Consent 1 (Standard)
Posterior Auricular Interpolation Flap Text: A decision was made to reconstruct the defect utilizing an interpolation axial flap and a staged reconstruction.  A telfa template was made of the defect.  This telfa template was then used to outline the posterior auricular interpolation flap.  The donor area for the pedicle flap was then injected with anesthesia.  The flap was excised through the skin and subcutaneous tissue down to the layer of the underlying musculature.  The pedicle flap was carefully excised within this deep plane to maintain its blood supply.  The edges of the donor site were undermined.   The donor site was closed in a primary fashion.  The pedicle was then rotated into position and sutured.  Once the tube was sutured into place, adequate blood supply was confirmed with blanching and refill.  The pedicle was then wrapped with xeroform gauze and dressed appropriately with a telfa and gauze bandage to ensure continued blood supply and protect the attached pedicle.
O-Z Plasty Text: The defect edges were debeveled with a #15 scalpel blade.  Given the location of the defect, shape of the defect and the proximity to free margins an O-Z plasty (double transposition flap) was deemed most appropriate.  Using a sterile surgical marker, the appropriate transposition flaps were drawn incorporating the defect and placing the expected incisions within the relaxed skin tension lines where possible.    The area thus outlined was incised deep to adipose tissue with a #15 scalpel blade.  The skin margins were undermined to an appropriate distance in all directions utilizing iris scissors.  Hemostasis was achieved with electrocautery.  The flaps were then transposed into place, one clockwise and the other counterclockwise, and anchored with interrupted buried subcutaneous sutures.
Complex Repair And Flap Additional Text (Will Appearing After The Standard Complex Repair Text): The complex repair was not sufficient to completely close the primary defect. The remaining additional defect was repaired with the flap mentioned below.
Burow's Advancement Flap Text: The defect edges were debeveled with a #15 scalpel blade.  Given the location of the defect and the proximity to free margins a Burow's advancement flap was deemed most appropriate.  Using a sterile surgical marker, the appropriate advancement flap was drawn incorporating the defect and placing the expected incisions within the relaxed skin tension lines where possible.    The area thus outlined was incised deep to adipose tissue with a #15 scalpel blade.  The skin margins were undermined to an appropriate distance in all directions utilizing iris scissors.
Epidermal Sutures: 5-0 Ethilon
Length To Time In Minutes Device Was In Place: 10
Ear Wedge Repair Text: A wedge excision was completed by carrying down an excision through the full thickness of the ear and cartilage with an inward facing Burow's triangle. The wound was then closed in a layered fashion.
Rhomboid Transposition Flap Text: The defect edges were debeveled with a #15 scalpel blade.  Given the location of the defect and the proximity to free margins a rhomboid transposition flap was deemed most appropriate.  Using a sterile surgical marker, an appropriate rhomboid flap was drawn incorporating the defect.    The area thus outlined was incised deep to adipose tissue with a #15 scalpel blade.  The skin margins were undermined to an appropriate distance in all directions utilizing iris scissors.
Consent 1/Introductory Paragraph: The rationale for Mohs was explained to the patient and consent was obtained. The risks, benefits and alternatives to therapy were discussed in detail. Specifically, the risks of infection, scarring, bleeding, prolonged wound healing, incomplete removal, allergy to anesthesia, nerve injury and recurrence were addressed. Prior to the procedure, the treatment site was clearly identified and confirmed by the patient. All components of Universal Protocol/PAUSE Rule completed.
Unique Flap 3 Name: Mercedes Flap
Consent 2/Introductory Paragraph: Mohs surgery was explained to the patient and consent was obtained. The risks, benefits and alternatives to therapy were discussed in detail. Specifically, the risks of infection, scarring, bleeding, prolonged wound healing, incomplete removal, allergy to anesthesia, nerve injury and recurrence were addressed. Prior to the procedure, the treatment site was clearly identified and confirmed by the patient. All components of Universal Protocol/PAUSE Rule completed.
Unique Flap 4 Name: Banner Flap
O-L Flap Text: The defect edges were debeveled with a #15 scalpel blade.  Given the location of the defect, shape of the defect and the proximity to free margins an O-L flap was deemed most appropriate.  Using a sterile surgical marker, an appropriate advancement flap was drawn incorporating the defect and placing the expected incisions within the relaxed skin tension lines where possible.    The area thus outlined was incised deep to adipose tissue with a #15 scalpel blade.  The skin margins were undermined to an appropriate distance in all directions utilizing iris scissors.
Number Of Stages: 2
Banner Transposition Flap Text: The defect edges were debeveled with a #15 scalpel blade.  Given the location of the defect and the proximity to free margins a Banner transposition flap was deemed most appropriate.  Using a sterile surgical marker, an appropriate flap drawn around the defect. The area thus outlined was incised deep to adipose tissue with a #15 scalpel blade.  The skin margins were undermined to an appropriate distance in all directions utilizing iris scissors.
Non-Graft Cartilage Fenestration Text: The cartilage was fenestrated with a 2mm punch biopsy to help facilitate healing.
Composite Graft Text: The defect edges were debeveled with a #15 scalpel blade.  Given the location of the defect, shape of the defect, the proximity to free margins and the fact the defect was full thickness a composite graft was deemed most appropriate.  The defect was outline and then transferred to the donor site.  A full thickness graft was then excised from the donor site. The graft was then placed in the primary defect, oriented appropriately and then sutured into place.  The secondary defect was then repaired using a primary closure.
Consent (Spinal Accessory)/Introductory Paragraph: The rationale for Mohs was explained to the patient and consent was obtained. The risks, benefits and alternatives to therapy were discussed in detail. Specifically, the risks of damage to the spinal accessory nerve, infection, scarring, bleeding, prolonged wound healing, incomplete removal, allergy to anesthesia, and recurrence were addressed. Prior to the procedure, the treatment site was clearly identified and confirmed by the patient. All components of Universal Protocol/PAUSE Rule completed.
Mohs Method Verbiage: An incision at a 45 degree angle following the standard Mohs approach was done and the specimen was harvested as a microscopic controlled layer.
Unique Flap 4 Text: The defect edges were debeveled with a #15 scalpel blade.  Given the location of the defect and the proximity to free margins a Banner transposition flap was deemed most appropriate.  Using a sterile surgical marker, an appropriate Banner transposition flap was drawn incorporating the defect.    The area thus outlined was incised deep to adipose tissue with a #15 scalpel blade.  The skin margins were undermined to an appropriate distance in all directions utilizing iris scissors.
Advancement-Rotation Flap Text: The defect edges were debeveled with a #15 scalpel blade.  Given the location of the defect, shape of the defect and the proximity to free margins an advancement-rotation flap was deemed most appropriate.  Using a sterile surgical marker, an appropriate flap was drawn incorporating the defect and placing the expected incisions within the relaxed skin tension lines where possible. The area thus outlined was incised deep to adipose tissue with a #15 scalpel blade.  The skin margins were undermined to an appropriate distance in all directions utilizing iris scissors.
H Plasty Text: Given the location of the defect, shape of the defect and the proximity to free margins a H-plasty was deemed most appropriate for repair.  Using a sterile surgical marker, the appropriate advancement arms of the H-plasty were drawn incorporating the defect and placing the expected incisions within the relaxed skin tension lines where possible. The area thus outlined was incised deep to adipose tissue with a #15 scalpel blade. The skin margins were undermined to an appropriate distance in all directions utilizing iris scissors.  The opposing advancement arms were then advanced into place in opposite direction and anchored with interrupted buried subcutaneous sutures.
Tissue Cultured Epidermal Autograft Text: The defect edges were debeveled with a #15 scalpel blade.  Given the location of the defect, shape of the defect and the proximity to free margins a tissue cultured epidermal autograft was deemed most appropriate.  The graft was then trimmed to fit the size of the defect.  The graft was then placed in the primary defect and oriented appropriately.
Surgical Defect Length In Cm (Optional): 1.7
Dorsal Nasal Flap Text: The defect edges were debeveled with a #15 scalpel blade.  Given the location of the defect and the proximity to free margins a dorsal nasal flap,based upon the glabellar folds, was deemed most appropriate.  Using a sterile surgical marker, an appropriate dorsal nasal flap was drawn around the defect.    The area thus outlined was incised deep to adipose tissue with a #15 scalpel blade.  The skin margins were undermined to an appropriate distance in all directions utilizing iris scissors.
Consent (Lip)/Introductory Paragraph: The rationale for Mohs was explained to the patient and consent was obtained. The risks, benefits and alternatives to therapy were discussed in detail. Specifically, the risks of lip deformity, changes in the oral aperture, infection, scarring, bleeding, prolonged wound healing, incomplete removal, allergy to anesthesia, nerve injury and recurrence were addressed. Prior to the procedure, the treatment site was clearly identified and confirmed by the patient. All components of Universal Protocol/PAUSE Rule completed.
Closure 2 Information: This tab is for additional flaps and grafts, including complex repair and grafts and complex repair and flaps. You can also specify a different location for the additional defect, if the location is the same you do not need to select a new one. We will insert the automated text for the repair you select below just as we do for solitary flaps and grafts. Please note that at this time if you select a location with a different insurance zone you will need to override the ICD10 and CPT if appropriate.
Surgical Defect Length In Cm (Optional): 1.5
Wound Care: Aquaphor
Cheek-To-Nose Interpolation Flap Text: A decision was made to reconstruct the defect utilizing an interpolation axial flap and a staged reconstruction.  A telfa template was made of the defect.  This telfa template was then used to outline the Cheek-To-Nose Interpolation flap.  The donor area for the pedicle flap was then injected with anesthesia.  The flap was excised through the skin and subcutaneous tissue down to the layer of the underlying musculature.  The interpolation flap was carefully excised within this deep plane to maintain its blood supply.  The edges of the donor site were undermined.   The donor site was closed in a primary fashion.  The pedicle was then rotated into position and sutured.  Once the tube was sutured into place, adequate blood supply was confirmed with blanching and refill.  The pedicle was then wrapped with xeroform gauze and dressed appropriately with a telfa and gauze bandage to ensure continued blood supply and protect the attached pedicle.
Hatchet Flap Text: The defect edges were debeveled with a #15 scalpel blade.  Given the location of the defect, shape of the defect and the proximity to free margins a hatchet flap based from the glabella was deemed most appropriate.  Using a sterile surgical marker, an appropriate glabellar hatchet flap was drawn incorporating the defect and placing the expected incisions within the relaxed skin tension lines where possible.    The area thus outlined was incised deep to adipose tissue with a #15 scalpel blade.  The skin margins were undermined to an appropriate distance in all directions utilizing iris scissors.
Interpolation Flap Text: A decision was made to reconstruct the defect utilizing an interpolation axial flap and a staged reconstruction.  A telfa template was made of the defect.  This telfa template was then used to outline the interpolation flap.  The donor area for the pedicle flap was then injected with anesthesia.  The flap was excised through the skin and subcutaneous tissue down to the layer of the underlying musculature.  The interpolation flap was carefully excised within this deep plane to maintain its blood supply.  The edges of the donor site were undermined.   The donor site was closed in a primary fashion.  The pedicle was then rotated into position and sutured.  Once the tube was sutured into place, adequate blood supply was confirmed with blanching and refill.  The pedicle was then wrapped with xeroform gauze and dressed appropriately with a telfa and gauze bandage to ensure continued blood supply and protect the attached pedicle.
Rotation Flap Text: The defect edges were debeveled with a #15 scalpel blade.  Given the location of the defect, shape of the defect and the proximity to free margins a rotation flap was deemed most appropriate.  Using a sterile surgical marker, an appropriate rotation flap was drawn incorporating the defect and placing the expected incisions within the relaxed skin tension lines where possible.    The area thus outlined was incised deep to adipose tissue with a #15 scalpel blade.  The skin margins were undermined to an appropriate distance in all directions utilizing iris scissors.
Subsequent Stages Histo Method Verbiage: Using a similar technique to that described above, a thin layer of tissue was removed from all areas where tumor was visible on the previous stage.  The tissue was again oriented, mapped, dyed, and processed as above.
Donor Site Anesthesia Type: same as repair anesthesia
Same Histology In Subsequent Stages Text: The pattern and morphology of the tumor is as described in the first stage.
Anesthesia Volume In Cc: 2.2
Area M Indication Text: Tumors in this location are included in Area M (cheek, forehead, scalp, neck, jawline and pretibial skin).  Mohs surgery is indicated for tumors in these anatomic locations.
Island Pedicle Flap-Requiring Vessel Identification Text: The defect edges were debeveled with a #15 scalpel blade.  Given the location of the defect, shape of the defect and the proximity to free margins an island pedicle advancement flap was deemed most appropriate.  Using a sterile surgical marker, an appropriate advancement flap was drawn, based on the axial vessel mentioned above, incorporating the defect, outlining the appropriate donor tissue and placing the expected incisions within the relaxed skin tension lines where possible.    The area thus outlined was incised deep to adipose tissue with a #15 scalpel blade.  The skin margins were undermined to an appropriate distance in all directions around the primary defect and laterally outward around the island pedicle utilizing iris scissors.  There was minimal undermining beneath the pedicle flap.
Partial Purse String (Intermediate) Text: Given the location of the defect and the characteristics of the surrounding skin an intermediate purse string closure was deemed most appropriate.  Undermining was performed circumfirentially around the surgical defect.  A purse string suture was then placed and tightened. Wound tension only allowed a partial closure of the circular defect.
Wound Care (No Sutures): Petrolatum
Paramedian Forehead Flap Text: A decision was made to reconstruct the defect utilizing an interpolation axial flap and a staged reconstruction.  A telfa template was made of the defect.  This telfa template was then used to outline the paramedian forehead pedicle flap.  The donor area for the pedicle flap was then injected with anesthesia.  The flap was excised through the skin and subcutaneous tissue down to the layer of the underlying musculature.  The pedicle flap was carefully excised within this deep plane to maintain its blood supply.  The edges of the donor site were undermined.   The donor site was closed in a primary fashion.  The pedicle was then rotated into position and sutured.  Once the tube was sutured into place, adequate blood supply was confirmed with blanching and refill.  The pedicle was then wrapped with xeroform gauze and dressed appropriately with a telfa and gauze bandage to ensure continued blood supply and protect the attached pedicle.
Muscle Hinge Flap Text: The defect edges were debeveled with a #15 scalpel blade.  Given the size, depth and location of the defect and the proximity to free margins a muscle hinge flap was deemed most appropriate.  Using a sterile surgical marker, an appropriate hinge flap was drawn incorporating the defect. The area thus outlined was incised with a #15 scalpel blade.  The skin margins were undermined to an appropriate distance in all directions utilizing iris scissors.
Hemostasis: Electrocautery
Complex Repair And Graft Additional Text (Will Appearing After The Standard Complex Repair Text): The complex repair was not sufficient to completely close the primary defect. The remaining additional defect was repaired with the graft mentioned below.
Chonodrocutaneous Helical Advancement Flap Text: The defect edges were debeveled with a #15 scalpel blade.  Given the location of the defect and the proximity to free margins a chondrocutaneous helical advancement flap was deemed most appropriate.  Using a sterile surgical marker, the appropriate advancement flap was drawn incorporating the defect and placing the expected incisions within the relaxed skin tension lines where possible.    The area thus outlined was incised deep to adipose tissue with a #15 scalpel blade.  The skin margins were undermined to an appropriate distance in all directions utilizing iris scissors.
Double O-Z Plasty Text: The defect edges were debeveled with a #15 scalpel blade.  Given the location of the defect, shape of the defect and the proximity to free margins a Double O-Z plasty (double transposition flap) was deemed most appropriate.  Using a sterile surgical marker, the appropriate transposition flaps were drawn incorporating the defect and placing the expected incisions within the relaxed skin tension lines where possible. The area thus outlined was incised deep to adipose tissue with a #15 scalpel blade.  The skin margins were undermined to an appropriate distance in all directions utilizing iris scissors.  Hemostasis was achieved with electrocautery.  The flaps were then transposed into place, one clockwise and the other counterclockwise, and anchored with interrupted buried subcutaneous sutures.
Pain Refusal Text: I offered to prescribe pain medication but the patient refused to take this medication.
Postop Diagnosis: same
Full Thickness Lip Wedge Repair (Flap) Text: Given the location of the defect and the proximity to free margins a full thickness wedge repair was deemed most appropriate.  Using a sterile surgical marker, the appropriate repair was drawn incorporating the defect and placing the expected incisions perpendicular to the vermilion border.  The vermilion border was also meticulously outlined to ensure appropriate reapproximation during the repair.  The area thus outlined was incised through and through with a #15 scalpel blade.  The muscularis and dermis were reaproximated with deep sutures following hemostasis. Care was taken to realign the vermilion border before proceeding with the superficial closure.  Once the vermilion was realigned the superfical and mucosal closure was finished.
Suture Removal: 7 days
Location Indication Override (Is Already Calculated Based On Selected Body Location): Area M
Bcc Histology Text: There were numerous aggregates of basaloid cells.
Bi-Rhombic Flap Text: The defect edges were debeveled with a #15 scalpel blade.  Given the location of the defect and the proximity to free margins a bi-rhombic flap was deemed most appropriate.  Using a sterile surgical marker, an appropriate rhombic flap was drawn incorporating the defect. The area thus outlined was incised deep to adipose tissue with a #15 scalpel blade.  The skin margins were undermined to an appropriate distance in all directions utilizing iris scissors.
Helical Rim Advancement Flap Text: The defect edges were debeveled with a #15 blade scalpel.  Given the location of the defect and the proximity to free margins (helical rim) a double helical rim advancement flap was deemed most appropriate.  Using a sterile surgical marker, the appropriate advancement flaps were drawn incorporating the defect and placing the expected incisions between the helical rim and antihelix where possible.  The area thus outlined was incised through and through with a #15 scalpel blade.  With a skin hook and iris scissors, the flaps were gently and sharply undermined and freed up.
Graft Donor Site Epidermal Sutures (Optional): 5-0 Ethibond
Surgeon Performing Repair (Optional): Melly
Bcc Infiltrative Histology Text: There were numerous aggregates of basaloid cells demonstrating an infiltrative pattern.
Ftsg Text: The defect edges were debeveled with a #15 scalpel blade.  Given the location of the defect, shape of the defect and the proximity to free margins a full thickness skin graft was deemed most appropriate.  Using a sterile surgical marker, the primary defect shape was transferred to the donor site. The area thus outlined was incised deep to adipose tissue with a #15 scalpel blade.  The harvested graft was then trimmed of adipose tissue until only dermis and epidermis was left.  The skin margins of the secondary defect were undermined to an appropriate distance in all directions utilizing iris scissors.  The secondary defect was closed with interrupted buried subcutaneous sutures.  The skin edges were then re-apposed with running  sutures.  The skin graft was then placed in the primary defect and oriented appropriately.
Consent 3/Introductory Paragraph: I gave the patient a chance to ask questions they had about the procedure.  Following this I explained the Mohs procedure and consent was obtained. The risks, benefits and alternatives to therapy were discussed in detail. Specifically, the risks of infection, scarring, bleeding, prolonged wound healing, incomplete removal, allergy to anesthesia, nerve injury and recurrence were addressed. Prior to the procedure, the treatment site was clearly identified and confirmed by the patient. All components of Universal Protocol/PAUSE Rule completed.
Unique Flap 1 Text: A decision was made to reconstruct the defect utilizing a myocutaneous Island pedicle Flap based on the levator labii superioris muscle.  A telfa template was made of the defect.  This telfa template was then used to outline the myocutaneous flap, based along the meilolabial fold.  The donor area for the pedicle flap was then injected with anesthesia.  The flap was excised through the skin and subcutaneous tissue down to the layer of the underlying musculature.  The myocutaneous flap was carefully excised within this deep plane to maintain its blood supply. Based on the muscle. The edges of the donor site were undermined.   The donor site was closed in a primary fashion to the point of transposition.  The pedicle was then transposed into position and sutured.  Once the flap was sutured into place, adequate blood supply was confirmed with blanching and refill.
O-Z Flap Text: The defect edges were debeveled with a #15 scalpel blade.  Given the location of the defect, shape of the defect and the proximity to free margins an O-Z flap was deemed most appropriate.  Using a sterile surgical marker, an appropriate transposition flap was drawn incorporating the defect and placing the expected incisions within the relaxed skin tension lines where possible. The area thus outlined was incised deep to adipose tissue with a #15 scalpel blade.  The skin margins were undermined to an appropriate distance in all directions utilizing iris scissors.
Epidermal Closure: running cuticular
Information: Selecting Yes will display possible errors in your note based on the variables you have selected. This validation is only offered as a suggestion for you. PLEASE NOTE THAT THE VALIDATION TEXT WILL BE REMOVED WHEN YOU FINALIZE YOUR NOTE. IF YOU WANT TO FAX A PRELIMINARY NOTE YOU WILL NEED TO TOGGLE THIS TO 'NO' IF YOU DO NOT WANT IT IN YOUR FAXED NOTE.
Epidermal Autograft Text: The defect edges were debeveled with a #15 scalpel blade.  Given the location of the defect, shape of the defect and the proximity to free margins an epidermal autograft was deemed most appropriate.  Using a sterile surgical marker, the primary defect shape was transferred to the donor site. The epidermal graft was then harvested.  The skin graft was then placed in the primary defect and oriented appropriately.
Bilobed Flap Text: The defect edges were debeveled with a #15 scalpel blade.  Given the location of the defect and the proximity to free margins a bilobe flap was deemed most appropriate.  Using a sterile surgical marker, an appropriate bilobe flap drawn around the defect.    The area thus outlined was incised deep to adipose tissue with a #15 scalpel blade.  The skin margins were undermined to an appropriate distance in all directions utilizing iris scissors.
Graft Cartilage Fenestration Text: The cartilage was fenestrated with a 2mm punch biopsy to help facilitate graft survival and healing.
Graft Donor Site Bandage (Optional-Leave Blank If You Don't Want In Note): Aquaphor and telefa placed on wound. Pressure dressing applied to donor site
Consent (Near Eyelid Margin)/Introductory Paragraph: The rationale for Mohs was explained to the patient and consent was obtained. The risks, benefits and alternatives to therapy were discussed in detail. Specifically, the risks of ectropion or eyelid deformity, infection, scarring, bleeding, prolonged wound healing, incomplete removal, allergy to anesthesia, nerve injury and recurrence were addressed. Prior to the procedure, the treatment site was clearly identified and confirmed by the patient. All components of Universal Protocol/PAUSE Rule completed.
Deep Sutures: 5-0 Vicryl
Manual Repair Warning Statement: We plan on removing the manually selected variable below in favor of our much easier automatic structured text blocks found in the previous tab. We decided to do this to help make the flow better and give you the full power of structured data. Manual selection is never going to be ideal in our platform and I would encourage you to avoid using manual selection from this point on, especially since I will be sunsetting this feature. It is important that you do one of two things with the customized text below. First, you can save all of the text in a word file so you can have it for future reference. Second, transfer the text to the appropriate area in the Library tab. Lastly, if there is a flap or graft type which we do not have you need to let us know right away so I can add it in before the variable is hidden. No need to panic, we plan to give you roughly 6 months to make the change.
Mercedes Flap Text: The defect edges were debeveled with a #15 scalpel blade.  Given the location of the defect, shape of the defect and the proximity to free margins a Mercedes flap was deemed most appropriate.  Using a sterile surgical marker, an appropriate advancement flap was drawn incorporating the defect and placing the expected incisions within the relaxed skin tension lines where possible. The area thus outlined was incised deep to adipose tissue with a #15 scalpel blade.  The skin margins were undermined to an appropriate distance in all directions utilizing iris scissors.
W Plasty Text: The lesion was extirpated to the level of the fat with a #15 scalpel blade.  Given the location of the defect, shape of the defect and the proximity to free margins a W-plasty was deemed most appropriate for repair.  Using a sterile surgical marker, the appropriate transposition arms of the W-plasty were drawn incorporating the defect and placing the expected incisions within the relaxed skin tension lines where possible.    The area thus outlined was incised deep to adipose tissue with a #15 scalpel blade.  The skin margins were undermined to an appropriate distance in all directions utilizing iris scissors.  The opposing transposition arms were then transposed into place in opposite direction and anchored with interrupted buried subcutaneous sutures.
Surgeon/Pathologist Verbiage (Will Incorporate Name Of Surgeon From Intro If Not Blank): operated in two distinct and integrated capacities as the surgeon and pathologist.
Xenograft Text: The defect edges were debeveled with a #15 scalpel blade.  Given the location of the defect, shape of the defect and the proximity to free margins a xenograft was deemed most appropriate.  The graft was then trimmed to fit the size of the defect.  The graft was then placed in the primary defect and oriented appropriately.
Island Pedicle Flap Text: The defect edges were debeveled with a #15 scalpel blade.  Given the location of the defect, shape of the defect and the proximity to free margins an island pedicle advancement flap was deemed most appropriate.  Using a sterile surgical marker, an appropriate advancement flap was drawn incorporating the defect, outlining the appropriate donor tissue and placing the expected incisions within the relaxed skin tension lines where possible.    The area thus outlined was incised deep to adipose tissue with a #15 scalpel blade.  The skin margins were undermined to an appropriate distance in all directions around the primary defect and laterally outward around the island pedicle utilizing iris scissors.  There was minimal undermining beneath the pedicle flap.
Consent (Scalp)/Introductory Paragraph: The rationale for Mohs was explained to the patient and consent was obtained. The risks, benefits and alternatives to therapy were discussed in detail. Specifically, the risks of changes in hair growth pattern secondary to repair, infection, scarring, bleeding, prolonged wound healing, incomplete removal, allergy to anesthesia, nerve injury and recurrence were addressed. Prior to the procedure, the treatment site was clearly identified and confirmed by the patient. All components of Universal Protocol/PAUSE Rule completed.
Detail Level: Detailed
Spiral Flap Text: The defect edges were debeveled with a #15 scalpel blade.  Given the location of the defect, shape of the defect and the proximity to free margins a spiral flap was deemed most appropriate.  Using a sterile surgical marker, an appropriate rotation flap was drawn incorporating the defect and placing the expected incisions within the relaxed skin tension lines where possible. The area thus outlined was incised deep to adipose tissue with a #15 scalpel blade.  The skin margins were undermined to an appropriate distance in all directions utilizing iris scissors.
No Residual Tumor Seen Histology Text: There were no malignant cells seen in the sections examined.
Mohs Rapid Report Verbiage: The area of clinically evident tumor was marked with skin marking ink and appropriately hatched.  The initial incision was made following the Mohs approach through the skin.  The specimen was taken to the lab, divided into the necessary number of pieces, chromacoded and processed according to the Mohs protocol.  This was repeated in successive stages until a tumor free defect was achieved.
Melolabial Interpolation Flap Text: A decision was made to reconstruct the defect utilizing an interpolation axial flap and a staged reconstruction.  A telfa template was made of the defect.  This telfa template was then used to outline the melolabial interpolation flap.  The donor area for the pedicle flap was then injected with anesthesia.  The flap was excised through the skin and subcutaneous tissue down to the layer of the underlying musculature.  The pedicle flap was carefully excised within this deep plane to maintain its blood supply.  The edges of the donor site were undermined.   The donor site was closed in a primary fashion.  The pedicle was then rotated into position and sutured.  Once the tube was sutured into place, adequate blood supply was confirmed with blanching and refill.  The pedicle was then wrapped with xeroform gauze and dressed appropriately with a telfa and gauze bandage to ensure continued blood supply and protect the attached pedicle.
Area L Indication Text: Tumors in this location are included in Area L (trunk and extremities).  Mohs surgery is indicated for larger tumors, or tumors with aggressive histologic features, in these anatomic locations.
Localized Dermabrasion With Wire Brush Text: The patient was draped in routine manner.  Localized dermabrasion using 3 x 17 mm wire brush was performed in routine manner to papillary dermis. This spot dermabrasion is being performed to complete skin cancer reconstruction. It also will eliminate the other sun damaged precancerous cells that are known to be part of the regional effect of a lifetime's worth of sun exposure. This localized dermabrasion is therapeutic and should not be considered cosmetic in any regard.
Advancement Flap (Single) Text: The defect edges were debeveled with a #15 scalpel blade.  Given the location of the defect and the proximity to free margins a single advancement flap was deemed most appropriate.  Using a sterile surgical marker, an appropriate advancement flap was drawn incorporating the defect and placing the expected incisions within the relaxed skin tension lines where possible.    The area thus outlined was incised deep to adipose tissue with a #15 scalpel blade.  The skin margins were undermined to an appropriate distance in all directions utilizing iris scissors.
Keystone Flap Text: The defect edges were debeveled with a #15 scalpel blade.  Given the location of the defect, shape of the defect a keystone flap was deemed most appropriate.  Using a sterile surgical marker, an appropriate keystone flap was drawn incorporating the defect, outlining the appropriate donor tissue and placing the expected incisions within the relaxed skin tension lines where possible. The area thus outlined was incised deep to adipose tissue with a #15 scalpel blade.  The skin margins were undermined to an appropriate distance in all directions around the primary defect and laterally outward around the flap utilizing iris scissors.
Retention Suture Bite Size: 3 mm
Cheiloplasty (Less Than 50%) Text: A decision was made to reconstruct the defect with a  cheiloplasty.  The defect was undermined extensively.  Additional obicularis oris muscle was excised with a 15 blade scalpel.  The defect was converted into a full thickness wedge, of less than 50% of the vertical height of the lip, to facilite a better cosmetic result.  Small vessels were then tied off with 5-0 monocyrl. The obicularis oris, superficial fascia, adipose and dermis were then reapproximated.  After the deeper layers were approximated the epidermis was reapproximated with particular care given to realign the vermilion border.
Melolabial Transposition Flap Text: The defect edges were debeveled with a #15 scalpel blade.  Given the location of the defect and the proximity to free margins a melolabial flap was deemed most appropriate.  Using a sterile surgical marker, an appropriate melolabial transposition flap was drawn incorporating the defect.    The area thus outlined was incised deep to adipose tissue with a #15 scalpel blade.  The skin margins were undermined to an appropriate distance in all directions utilizing iris scissors.
Medical Necessity Statement: Based on my medical judgement, Mohs surgery is the most appropriate treatment for this cancer compared to other treatments.
Unique Flap 1 Name: Myocutaneous Island pedicle Flap
Crescentic Advancement Flap Text: The defect edges were debeveled with a #15 scalpel blade.  Given the location of the defect and the proximity to free margins a crescentic advancement flap was deemed most appropriate.  Using a sterile surgical marker, the appropriate advancement flap was drawn incorporating the defect and placing the expected incisions within the relaxed skin tension lines where possible.    The area thus outlined was incised deep to adipose tissue with a #15 scalpel blade.  The skin margins were undermined to an appropriate distance in all directions utilizing iris scissors.
Mauc Instructions: By selecting yes to the question below the MAUC number will be added into the note.  This will be calculated automatically based on the diagnosis chosen, the size entered, the body zone selected (H,M,L) and the specific indications you chose. You will also have the option to override the Mohs AUC if you disagree with the automatically calculated number and this option is found in the Case Summary tab.
Simple / Intermediate / Complex Repair - Final Wound Length In Cm: 3.8

## 2019-07-29 ENCOUNTER — APPOINTMENT (RX ONLY)
Dept: URBAN - METROPOLITAN AREA CLINIC 36 | Facility: CLINIC | Age: 84
Setting detail: DERMATOLOGY
End: 2019-07-29

## 2019-07-29 DIAGNOSIS — Z48.02 ENCOUNTER FOR REMOVAL OF SUTURES: ICD-10-CM

## 2019-07-29 PROCEDURE — 99024 POSTOP FOLLOW-UP VISIT: CPT

## 2019-07-29 PROCEDURE — ? SUTURE REMOVAL (GLOBAL PERIOD)

## 2019-07-29 ASSESSMENT — LOCATION DETAILED DESCRIPTION DERM: LOCATION DETAILED: LEFT CENTRAL MALAR CHEEK

## 2019-07-29 ASSESSMENT — LOCATION SIMPLE DESCRIPTION DERM: LOCATION SIMPLE: LEFT CHEEK

## 2019-07-29 ASSESSMENT — LOCATION ZONE DERM: LOCATION ZONE: FACE

## 2019-07-29 NOTE — PROCEDURE: SUTURE REMOVAL (GLOBAL PERIOD)
Detail Level: Detailed
Add 21662 Cpt? (Important Note: In 2017 The Use Of 34715 Is Being Tracked By Cms To Determine Future Global Period Reimbursement For Global Periods): yes
Body Location Override (Optional - Billing Will Still Be Based On Selected Body Map Location If Applicable): left buccal cheek

## 2019-07-30 DIAGNOSIS — C18.9 ADENOCARCINOMA OF COLON (HCC): ICD-10-CM

## 2019-07-30 DIAGNOSIS — M25.552 HIP PAIN, LEFT: ICD-10-CM

## 2019-07-30 DIAGNOSIS — Z79.899 LONG-TERM USE OF PLAQUENIL: ICD-10-CM

## 2019-07-30 DIAGNOSIS — M05.79 RHEUMATOID ARTHRITIS INVOLVING MULTIPLE SITES WITH POSITIVE RHEUMATOID FACTOR (HCC): ICD-10-CM

## 2019-07-30 RX ORDER — HYDROXYCHLOROQUINE SULFATE 200 MG/1
TABLET, FILM COATED ORAL
Qty: 180 TAB | Refills: 0 | Status: SHIPPED | OUTPATIENT
Start: 2019-07-30 | End: 2019-11-14 | Stop reason: SDUPTHER

## 2019-08-07 ENCOUNTER — TELEPHONE (OUTPATIENT)
Dept: MEDICAL GROUP | Facility: PHYSICIAN GROUP | Age: 84
End: 2019-08-07

## 2019-08-07 NOTE — TELEPHONE ENCOUNTER
VOICEMAIL  [facility]. [facility]                      Call Back Number: [phone]    2. Message: She is scheduled for a colonoscopy on 08/13/19 and we need clearance to hold her Xeralto. I do need this tomorrow as we are not here on Friday and she would need instruction by tomorrow as to when to hold it. So again, our fax number here is [phone] my phone is [phone]     3. Patient approves office to leave a detailed voicemail/XOJEThart message: N\A

## 2019-08-14 ENCOUNTER — APPOINTMENT (RX ONLY)
Dept: URBAN - METROPOLITAN AREA CLINIC 4 | Facility: CLINIC | Age: 84
Setting detail: DERMATOLOGY
End: 2019-08-14

## 2019-08-14 DIAGNOSIS — Z87.2 PERSONAL HISTORY OF DISEASES OF THE SKIN AND SUBCUTANEOUS TISSUE: ICD-10-CM

## 2019-08-14 DIAGNOSIS — Z85.828 PERSONAL HISTORY OF OTHER MALIGNANT NEOPLASM OF SKIN: ICD-10-CM

## 2019-08-14 DIAGNOSIS — Z86.006 PERSONAL HISTORY OF MELANOMA IN-SITU: ICD-10-CM

## 2019-08-14 DIAGNOSIS — Z86.007 PERSONAL HISTORY OF IN-SITU NEOPLASM OF SKIN: ICD-10-CM

## 2019-08-14 DIAGNOSIS — L82.1 OTHER SEBORRHEIC KERATOSIS: ICD-10-CM

## 2019-08-14 DIAGNOSIS — Z85.820 PERSONAL HISTORY OF MALIGNANT MELANOMA OF SKIN: ICD-10-CM

## 2019-08-14 DIAGNOSIS — Z71.89 OTHER SPECIFIED COUNSELING: ICD-10-CM

## 2019-08-14 DIAGNOSIS — L81.4 OTHER MELANIN HYPERPIGMENTATION: ICD-10-CM

## 2019-08-14 DIAGNOSIS — D18.0 HEMANGIOMA: ICD-10-CM

## 2019-08-14 DIAGNOSIS — L57.0 ACTINIC KERATOSIS: ICD-10-CM

## 2019-08-14 DIAGNOSIS — D22 MELANOCYTIC NEVI: ICD-10-CM

## 2019-08-14 PROBLEM — D48.5 NEOPLASM OF UNCERTAIN BEHAVIOR OF SKIN: Status: ACTIVE | Noted: 2019-08-14

## 2019-08-14 PROBLEM — D18.01 HEMANGIOMA OF SKIN AND SUBCUTANEOUS TISSUE: Status: ACTIVE | Noted: 2019-08-14

## 2019-08-14 PROBLEM — D22.5 MELANOCYTIC NEVI OF TRUNK: Status: ACTIVE | Noted: 2019-08-14

## 2019-08-14 PROCEDURE — ? COUNSELING

## 2019-08-14 PROCEDURE — ? BIOPSY BY SHAVE METHOD

## 2019-08-14 PROCEDURE — 11103 TANGNTL BX SKIN EA SEP/ADDL: CPT

## 2019-08-14 PROCEDURE — ? LIQUID NITROGEN

## 2019-08-14 PROCEDURE — 11102 TANGNTL BX SKIN SINGLE LES: CPT | Mod: 59

## 2019-08-14 PROCEDURE — ? ADDITIONAL NOTES

## 2019-08-14 PROCEDURE — 99214 OFFICE O/P EST MOD 30 MIN: CPT | Mod: 25

## 2019-08-14 PROCEDURE — 17004 DESTROY PREMAL LESIONS 15/>: CPT

## 2019-08-14 ASSESSMENT — LOCATION DETAILED DESCRIPTION DERM
LOCATION DETAILED: RIGHT ULNAR DORSAL HAND
LOCATION DETAILED: LEFT LATERAL SUPERIOR CHEST
LOCATION DETAILED: LEFT RADIAL DORSAL HAND
LOCATION DETAILED: LEFT SUPERIOR PREAURICULAR CHEEK
LOCATION DETAILED: NASAL DORSUM
LOCATION DETAILED: RIGHT CENTRAL MALAR CHEEK
LOCATION DETAILED: LEFT INFERIOR LATERAL NECK
LOCATION DETAILED: RIGHT INFERIOR ANTERIOR NECK
LOCATION DETAILED: RIGHT SUPERIOR MEDIAL MALAR CHEEK
LOCATION DETAILED: LEFT CENTRAL EYEBROW
LOCATION DETAILED: LEFT SUPERIOR FOREHEAD
LOCATION DETAILED: LEFT DISTAL PRETIBIAL REGION
LOCATION DETAILED: LEFT MEDIAL MALAR CHEEK
LOCATION DETAILED: RIGHT RADIAL DORSAL HAND
LOCATION DETAILED: LEFT CENTRAL FRONTAL SCALP
LOCATION DETAILED: LEFT SUPERIOR PARIETAL SCALP
LOCATION DETAILED: RIGHT CLAVICULAR SKIN
LOCATION DETAILED: LEFT SUPERIOR MEDIAL FOREHEAD
LOCATION DETAILED: RIGHT CENTRAL LATERAL NECK
LOCATION DETAILED: LEFT INFERIOR CENTRAL MALAR CHEEK
LOCATION DETAILED: RIGHT SUPERIOR FOREHEAD
LOCATION DETAILED: RIGHT PROXIMAL LATERAL POSTERIOR UPPER ARM
LOCATION DETAILED: LEFT MEDIAL ZYGOMA
LOCATION DETAILED: RIGHT INFERIOR CENTRAL MALAR CHEEK
LOCATION DETAILED: LEFT LATERAL INFERIOR EYELID
LOCATION DETAILED: RIGHT LATERAL FOREHEAD
LOCATION DETAILED: RIGHT MEDIAL SUPERIOR CHEST
LOCATION DETAILED: RIGHT SUPERIOR LATERAL NECK
LOCATION DETAILED: RIGHT DISTAL RADIAL DORSAL FOREARM
LOCATION DETAILED: LEFT MEDIAL SUPERIOR CHEST
LOCATION DETAILED: EPIGASTRIC SKIN
LOCATION DETAILED: LEFT MEDIAL UPPER BACK
LOCATION DETAILED: RIGHT MEDIAL CANTHUS
LOCATION DETAILED: LEFT ULNAR DORSAL HAND
LOCATION DETAILED: LEFT CENTRAL PARIETAL SCALP
LOCATION DETAILED: INFERIOR MID FOREHEAD
LOCATION DETAILED: MID POSTERIOR NECK
LOCATION DETAILED: LEFT RIB CAGE

## 2019-08-14 ASSESSMENT — LOCATION SIMPLE DESCRIPTION DERM
LOCATION SIMPLE: RIGHT ANTERIOR NECK
LOCATION SIMPLE: LEFT FOREHEAD
LOCATION SIMPLE: NOSE
LOCATION SIMPLE: RIGHT POSTERIOR UPPER ARM
LOCATION SIMPLE: RIGHT FOREARM
LOCATION SIMPLE: RIGHT CLAVICULAR SKIN
LOCATION SIMPLE: LEFT HAND
LOCATION SIMPLE: SCALP
LOCATION SIMPLE: INFERIOR FOREHEAD
LOCATION SIMPLE: POSTERIOR NECK
LOCATION SIMPLE: LEFT UPPER BACK
LOCATION SIMPLE: CHEST
LOCATION SIMPLE: LEFT CHEEK
LOCATION SIMPLE: NECK
LOCATION SIMPLE: LEFT PRETIBIAL REGION
LOCATION SIMPLE: ABDOMEN
LOCATION SIMPLE: RIGHT HAND
LOCATION SIMPLE: LEFT INFERIOR EYELID
LOCATION SIMPLE: LEFT ANTERIOR NECK
LOCATION SIMPLE: RIGHT CHEEK
LOCATION SIMPLE: RIGHT EYELID
LOCATION SIMPLE: LEFT SCALP
LOCATION SIMPLE: LEFT ZYGOMA
LOCATION SIMPLE: LEFT EYEBROW
LOCATION SIMPLE: RIGHT FOREHEAD

## 2019-08-14 ASSESSMENT — LOCATION ZONE DERM
LOCATION ZONE: NECK
LOCATION ZONE: LEG
LOCATION ZONE: FACE
LOCATION ZONE: SCALP
LOCATION ZONE: ARM
LOCATION ZONE: TRUNK
LOCATION ZONE: NOSE
LOCATION ZONE: HAND
LOCATION ZONE: EYELID

## 2019-08-14 NOTE — PROCEDURE: BIOPSY BY SHAVE METHOD
Dressing: bandage
Render Post-Care Instructions In Note?: yes
Anesthesia Volume In Cc: 0.5
Type Of Destruction Used: Curettage
Electrodesiccation And Curettage Text: The wound bed was treated with electrodesiccation and curettage after the biopsy was performed.
Size Of Lesion In Cm: 2
Consent: Written consent was obtained and risks were reviewed including but not limited to scarring, infection, bleeding, scabbing, incomplete removal, nerve damage and allergy to anesthesia.
Depth Of Biopsy: dermis
Electrodesiccation Text: The wound bed was treated with electrodesiccation after the biopsy was performed.
Wound Care: Vaseline
X Size Of Lesion In Cm: 0
Biopsy Type: H and E
Bill For Surgical Tray: no
Notification Instructions: Patient will be notified of biopsy results. However, patient instructed to call the office if not contacted within 2 weeks.
Lab Facility: 
Cryotherapy Text: The wound bed was treated with cryotherapy after the biopsy was performed.
Anesthesia Type: 1% lidocaine with epinephrine
Post-Care Instructions: I reviewed with the patient in detail post-care instructions. Patient is to keep the biopsy site dry overnight, and then apply vaseline twice daily until healed.
Lab: [id number]
Curettage Text: The wound bed was treated with curettage after the biopsy was performed.
Biopsy Method: Personna blade
Billing Type: Third-Party Bill
Detail Level: Detailed
Hemostasis: Drysol and Electrocautery
Silver Nitrate Text: The wound bed was treated with silver nitrate after the biopsy was performed.
Size Of Lesion In Cm: 0.4

## 2019-08-14 NOTE — PROCEDURE: ADDITIONAL NOTES
Detail Level: Simple
Additional Notes: Recommended for the patient to get up to date on gynecological exam.

## 2019-08-14 NOTE — HPI: FULL BODY SKIN EXAMINATION
How Severe Are Your Spot(S)?: mild
What Type Of Note Output Would You Prefer (Optional)?: Standard Output
What Is The Reason For Today's Visit?: Full Body Skin Examination with No Concerns
What Is The Reason For Today's Visit? (Being Monitored For X): concerning skin lesions on an annual basis
Additional History: She has not noticed any changes with her moles or any tender or bleeding spots.

## 2019-08-19 ENCOUNTER — OFFICE VISIT (OUTPATIENT)
Dept: MEDICAL GROUP | Facility: PHYSICIAN GROUP | Age: 84
End: 2019-08-19
Payer: MEDICARE

## 2019-08-19 VITALS
WEIGHT: 164 LBS | OXYGEN SATURATION: 93 % | SYSTOLIC BLOOD PRESSURE: 114 MMHG | DIASTOLIC BLOOD PRESSURE: 68 MMHG | RESPIRATION RATE: 14 BRPM | BODY MASS INDEX: 29.06 KG/M2 | TEMPERATURE: 98.6 F | HEART RATE: 72 BPM | HEIGHT: 63 IN

## 2019-08-19 DIAGNOSIS — Z01.810 PREOPERATIVE CARDIOVASCULAR EXAMINATION: ICD-10-CM

## 2019-08-19 PROCEDURE — 99213 OFFICE O/P EST LOW 20 MIN: CPT | Performed by: INTERNAL MEDICINE

## 2019-08-19 NOTE — PROGRESS NOTES
PRIMARY CARE CLINIC FOLLOW UP VISIT  Chief Complaint   Patient presents with   • Medical Clearance     Hip Surgery 9/16/19     History of Present Illness     Preoperative cardiovascular examination  She has a hip replacement scheduled for 9/16. Her cardiologist is Dr. Olivera and he feels it is appropriate for her to proceed with surgery. Her last surgery was a hemicolectomy fall 2018 without any issues. Denies chest pain, shortness of breath, history of MI/stroke.     Current Outpatient Medications   Medication Sig Dispense Refill   • hydroxychloroquine (PLAQUENIL) 200 MG Tab 1 tab po bid 180 Tab 0   • LYRICA 100 MG Cap      • omeprazole (PRILOSEC) 20 MG delayed-release capsule      • nystatin/triamcinolone (MYCOLOG) 553470-1.1 UNIT/GM-% Cream      • methylPREDNISolone (MEDROL) 4 MG Tab 1 tab po qday 30 Tab 0   • lidocaine (LIDODERM) 5 % Patch Apply 1 Patch to skin as directed every 24 hours. 90 Patch 0   • rivaroxaban (XARELTO) 20 MG Tab tablet Take 1 Tab by mouth with dinner. 90 Tab 1   • ondansetron (ZOFRAN ODT) 4 MG TABLET DISPERSIBLE Take 1 Tab by mouth every 6 hours as needed for Nausea. (Patient not taking: Reported on 6/6/2019) 12 Tab 0   • miconazole (MICOTIN) 2 % Cream Apply to the affected area twice a day. (Patient not taking: Reported on 5/2/2019) 1 Tube 1   • atorvastatin (LIPITOR) 10 MG Tab Take 10 mg by mouth every evening.     • omeprazole (PRILOSEC) 20 MG Tablet Delayed Response delayed-release tablet Take 1 Tab by mouth every morning.     • FLUOROURACIL EX 1 Each by Apply externally route as needed.     • Diphenhydramine-APAP, sleep, (TYLENOL PM EXTRA STRENGTH)  MG Tab Take 2 Tabs by mouth every bedtime.     • acetaminophen (TYLENOL) 500 MG Tab Take 1,000 mg by mouth every morning.     • Melatonin 10 MG Tab Take 1 Tab by mouth every bedtime.     • vitamin D (CHOLECALCIFEROL) 1000 UNIT Tab Take 1,000 Units by mouth every morning.     • POTASSIUM GLUCONATE Take 1 Tab by mouth every morning. Pt  "unsure of dose     • Calcium Carbonate-Vitamin D (CALCIUM + D PO) Take 1 Tab by mouth every bedtime.       Current Facility-Administered Medications   Medication Dose Route Frequency Provider Last Rate Last Dose   • denosumab (PROLIA) subq injection 60 mg  60 mg Subcutaneous Q6 MO Deanna Escoto M.D.   60 mg at 06/06/19 1219     Past Medical History:   Diagnosis Date   • Adenocarcinoma of colon (HCC) 10/30/2018    From sessile serrated polyp 10/2018   • Anesthesia     pt states \"one new dr scraped my esophagus when they put the tube in and I started bleeding so they couldn't operate\"    • Atrial fibrillation [I48.91] 4/19/2016     pt denies    • Back pain 6/27/2012   • Blood clotting disorder (HCC) 2012    clot in leg   • Bowel habit changes     constipation    • Cancer (HCC)     skin, colon 2018   • Cataract     belia IOL    • Chronic back pain greater than 3 months duration    • Deep vein thrombosis (HCC) 3/8/2016    First occurrence in LLE in late 1970s Second occurrence further up in LLE in 2012, has been on AC since    • Essential hypertension, benign 6/27/2012   • GERD (gastroesophageal reflux disease) 6/27/2012   • Heart murmur    • Hyperlipidemia    • Hypertension     hx of, not currently    • Impaired fasting glucose 11/2/2017   • Mild aortic stenosis 11/2/2017   • Other and unspecified hyperlipidemia 6/27/2012   • Prediabetes    • Protein S deficiency (HCC) 11/2/2017    Noted in lab work 2013 as a part of work up at [facility]    • Rheumatoid arthritis involving multiple sites with positive rheumatoid factor (HCC) 03/14/2016    Dr. Escoto   • Rheumatoid nodule (HCC) 7/25/2017   • Right bundle branch block 6/27/2012    pt denies    • Urinary incontinence 11/2/2017     Past Surgical History:   Procedure Laterality Date   • HEMICOLECTOMY Right 12/13/2018    Procedure: OPEN RIGHT HEMICOLECTOMY;  Surgeon: Dash Bhagat M.D.;  Location: SURGERY [facility];  Service: General   • INGUINAL " "HERNIA REPAIR Right 2016    Procedure: INGUINAL HERNIA REPAIR - PRIMARY;  Surgeon: Mary Medina M.D.;  Location: SURGERY [facility];  Service:    • OTHER  2014    peroneal nerve surgery Dr. Castro    • OTHER ORTHOPEDIC SURGERY      left knee partial   • OTHER ORTHOPEDIC SURGERY      meniscus repair   • ATHROPLASTY      partial TKA with Dr. 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 All other systems reviewed and negative.        Objective   /68   Pulse 72   Temp 37 °C (98.6 °F)   Resp 14   Ht 1.6 m (5' 3\")   Wt 74.4 kg (164 lb)   SpO2 93%  Body mass index is 29.05 kg/m².    General: alert and oriented, pleasant, cooperative  HEENT: Normocephalic, atraumatic.    Cardiovascular: regular rate and rhythm, normal S1/S2. Systolic ejection murmur   Pulmonary: lungs clear to auscultation bilaterally  Skin: warm and dry, no lesions or rashes  Psychiatric: appropriate mood and affect. Good insight and appropriate judgment     Assessment and Plan   The following treatment plan was discussed     1. Preoperative cardiovascular examination  Scanned note from her cardiologist that she may proceed with surgery into media tab. Will send letter to Dr." America as well.     Healthcare maintenance     Health Maintenance Due   Topic Date Due   • Annual Wellness Visit  1935   • IMM INFLUENZA (1) 09/01/2019       No follow-ups on file.    Eric Cook MD  Internal Medicine  [facility]

## 2019-08-19 NOTE — LETTER
August 19, 2019        Re: [facility]      Dr. Cross,       Marry may proceed for hip replacement surgery without any further work up at this time. She also has a note from her cardiologist Dr. Olivera stating the same.       Call if questions,         Eric Cook M.D.

## 2019-08-19 NOTE — ASSESSMENT & PLAN NOTE
She has a hip replacement scheduled for 9/16. Her cardiologist is Dr. Olivera and he feels it is appropriate for her to proceed with surgery. Her last surgery was a hemicolectomy fall 2018 without any issues. Denies chest pain, shortness of breath, history of MI/stroke.

## 2019-08-21 ENCOUNTER — APPOINTMENT (OUTPATIENT)
Dept: RADIOLOGY | Facility: MEDICAL CENTER | Age: 84
End: 2019-08-21
Attending: EMERGENCY MEDICINE
Payer: MEDICARE

## 2019-08-21 ENCOUNTER — HOSPITAL ENCOUNTER (EMERGENCY)
Facility: MEDICAL CENTER | Age: 84
End: 2019-08-22
Attending: EMERGENCY MEDICINE
Payer: MEDICARE

## 2019-08-21 DIAGNOSIS — R10.9 RIGHT FLANK PAIN: ICD-10-CM

## 2019-08-21 LAB
ALBUMIN SERPL BCP-MCNC: 4.3 G/DL (ref 3.2–4.9)
ALBUMIN/GLOB SERPL: 1.4 G/DL
ALP SERPL-CCNC: 46 U/L (ref 30–99)
ALT SERPL-CCNC: 12 U/L (ref 2–50)
ANION GAP SERPL CALC-SCNC: 10 MMOL/L (ref 0–11.9)
APPEARANCE UR: CLEAR
AST SERPL-CCNC: 18 U/L (ref 12–45)
BASOPHILS # BLD AUTO: 0.6 % (ref 0–1.8)
BASOPHILS # BLD: 0.04 K/UL (ref 0–0.12)
BILIRUB SERPL-MCNC: 0.6 MG/DL (ref 0.1–1.5)
BILIRUB UR QL STRIP.AUTO: NEGATIVE
BUN SERPL-MCNC: 35 MG/DL (ref 8–22)
CALCIUM SERPL-MCNC: 9.3 MG/DL (ref 8.5–10.5)
CHLORIDE SERPL-SCNC: 105 MMOL/L (ref 96–112)
CO2 SERPL-SCNC: 28 MMOL/L (ref 20–33)
COLOR UR: YELLOW
CREAT SERPL-MCNC: 1.1 MG/DL (ref 0.5–1.4)
EOSINOPHIL # BLD AUTO: 0.13 K/UL (ref 0–0.51)
EOSINOPHIL NFR BLD: 2 % (ref 0–6.9)
ERYTHROCYTE [DISTWIDTH] IN BLOOD BY AUTOMATED COUNT: 44.4 FL (ref 35.9–50)
GLOBULIN SER CALC-MCNC: 3 G/DL (ref 1.9–3.5)
GLUCOSE SERPL-MCNC: 100 MG/DL (ref 65–99)
GLUCOSE UR STRIP.AUTO-MCNC: NEGATIVE MG/DL
HCT VFR BLD AUTO: 44.9 % (ref 37–47)
HGB BLD-MCNC: 14.8 G/DL (ref 12–16)
IMM GRANULOCYTES # BLD AUTO: 0.01 K/UL (ref 0–0.11)
IMM GRANULOCYTES NFR BLD AUTO: 0.2 % (ref 0–0.9)
KETONES UR STRIP.AUTO-MCNC: NEGATIVE MG/DL
LEUKOCYTE ESTERASE UR QL STRIP.AUTO: NEGATIVE
LIPASE SERPL-CCNC: 45 U/L (ref 11–82)
LYMPHOCYTES # BLD AUTO: 1.41 K/UL (ref 1–4.8)
LYMPHOCYTES NFR BLD: 21.9 % (ref 22–41)
MCH RBC QN AUTO: 30.5 PG (ref 27–33)
MCHC RBC AUTO-ENTMCNC: 33 G/DL (ref 33.6–35)
MCV RBC AUTO: 92.6 FL (ref 81.4–97.8)
MICRO URNS: ABNORMAL
MONOCYTES # BLD AUTO: 0.53 K/UL (ref 0–0.85)
MONOCYTES NFR BLD AUTO: 8.2 % (ref 0–13.4)
NEUTROPHILS # BLD AUTO: 4.31 K/UL (ref 2–7.15)
NEUTROPHILS NFR BLD: 67.1 % (ref 44–72)
NITRITE UR QL STRIP.AUTO: NEGATIVE
NRBC # BLD AUTO: 0 K/UL
NRBC BLD-RTO: 0 /100 WBC
PH UR STRIP.AUTO: 8.5 [PH] (ref 5–8)
PLATELET # BLD AUTO: 181 K/UL (ref 164–446)
PMV BLD AUTO: 11.5 FL (ref 9–12.9)
POTASSIUM SERPL-SCNC: 4 MMOL/L (ref 3.6–5.5)
PROT SERPL-MCNC: 7.3 G/DL (ref 6–8.2)
PROT UR QL STRIP: NEGATIVE MG/DL
RBC # BLD AUTO: 4.85 M/UL (ref 4.2–5.4)
RBC UR QL AUTO: NEGATIVE
SODIUM SERPL-SCNC: 143 MMOL/L (ref 135–145)
SP GR UR STRIP.AUTO: 1.02
UROBILINOGEN UR STRIP.AUTO-MCNC: 1 MG/DL
WBC # BLD AUTO: 6.4 K/UL (ref 4.8–10.8)

## 2019-08-21 PROCEDURE — 700111 HCHG RX REV CODE 636 W/ 250 OVERRIDE (IP): Performed by: EMERGENCY MEDICINE

## 2019-08-21 PROCEDURE — 83690 ASSAY OF LIPASE: CPT

## 2019-08-21 PROCEDURE — 96374 THER/PROPH/DIAG INJ IV PUSH: CPT

## 2019-08-21 PROCEDURE — 80053 COMPREHEN METABOLIC PANEL: CPT

## 2019-08-21 PROCEDURE — 85025 COMPLETE CBC W/AUTO DIFF WBC: CPT

## 2019-08-21 PROCEDURE — 99285 EMERGENCY DEPT VISIT HI MDM: CPT

## 2019-08-21 PROCEDURE — 81003 URINALYSIS AUTO W/O SCOPE: CPT

## 2019-08-21 RX ORDER — KETOROLAC TROMETHAMINE 30 MG/ML
15 INJECTION, SOLUTION INTRAMUSCULAR; INTRAVENOUS ONCE
Status: COMPLETED | OUTPATIENT
Start: 2019-08-21 | End: 2019-08-21

## 2019-08-21 RX ADMIN — KETOROLAC TROMETHAMINE 15 MG: 30 INJECTION, SOLUTION INTRAMUSCULAR at 23:28

## 2019-08-22 ENCOUNTER — APPOINTMENT (OUTPATIENT)
Dept: RADIOLOGY | Facility: MEDICAL CENTER | Age: 84
End: 2019-08-22
Attending: EMERGENCY MEDICINE
Payer: MEDICARE

## 2019-08-22 ENCOUNTER — OFFICE VISIT (OUTPATIENT)
Dept: MEDICAL GROUP | Facility: PHYSICIAN GROUP | Age: 84
End: 2019-08-22
Payer: MEDICARE

## 2019-08-22 VITALS
TEMPERATURE: 98.6 F | WEIGHT: 164 LBS | SYSTOLIC BLOOD PRESSURE: 106 MMHG | DIASTOLIC BLOOD PRESSURE: 70 MMHG | OXYGEN SATURATION: 98 % | BODY MASS INDEX: 29.06 KG/M2 | HEIGHT: 63 IN | HEART RATE: 66 BPM | RESPIRATION RATE: 14 BRPM

## 2019-08-22 VITALS
SYSTOLIC BLOOD PRESSURE: 160 MMHG | OXYGEN SATURATION: 93 % | WEIGHT: 164.68 LBS | RESPIRATION RATE: 20 BRPM | DIASTOLIC BLOOD PRESSURE: 61 MMHG | HEIGHT: 63 IN | BODY MASS INDEX: 29.18 KG/M2 | TEMPERATURE: 97.9 F | HEART RATE: 68 BPM

## 2019-08-22 DIAGNOSIS — R10.9 FLANK PAIN: ICD-10-CM

## 2019-08-22 PROCEDURE — 74176 CT ABD & PELVIS W/O CONTRAST: CPT

## 2019-08-22 PROCEDURE — 99214 OFFICE O/P EST MOD 30 MIN: CPT | Performed by: INTERNAL MEDICINE

## 2019-08-22 NOTE — PROGRESS NOTES
PRIMARY CARE CLINIC FOLLOW UP VISIT  Chief Complaint   Patient presents with   • Flank Pain     Rt   • Dehydration     BUN is elevated      History of Present Illness     Flank pain  She has been having ongoing flank pain for the past few weeks. Symptoms were worse enough by Wednesday so she went into the ER where she had unremarkable CT a/p. She has been less active today and her symptoms are better. Has tried a lidocaine patch which made her symptoms worse. Has been using her cane more. BUN was also noted to be elevated in the ER and she has been drinking more water.     Current Outpatient Medications   Medication Sig Dispense Refill   • hydroxychloroquine (PLAQUENIL) 200 MG Tab 1 tab po bid 180 Tab 0   • LYRICA 100 MG Cap      • omeprazole (PRILOSEC) 20 MG delayed-release capsule      • nystatin/triamcinolone (MYCOLOG) 206866-2.1 UNIT/GM-% Cream      • methylPREDNISolone (MEDROL) 4 MG Tab 1 tab po qday 30 Tab 0   • lidocaine (LIDODERM) 5 % Patch Apply 1 Patch to skin as directed every 24 hours. 90 Patch 0   • rivaroxaban (XARELTO) 20 MG Tab tablet Take 1 Tab by mouth with dinner. 90 Tab 1   • ondansetron (ZOFRAN ODT) 4 MG TABLET DISPERSIBLE Take 1 Tab by mouth every 6 hours as needed for Nausea. (Patient not taking: Reported on 6/6/2019) 12 Tab 0   • miconazole (MICOTIN) 2 % Cream Apply to the affected area twice a day. (Patient not taking: Reported on 5/2/2019) 1 Tube 1   • atorvastatin (LIPITOR) 10 MG Tab Take 10 mg by mouth every evening.     • omeprazole (PRILOSEC) 20 MG Tablet Delayed Response delayed-release tablet Take 1 Tab by mouth every morning.     • FLUOROURACIL EX 1 Each by Apply externally route as needed.     • Diphenhydramine-APAP, sleep, (TYLENOL PM EXTRA STRENGTH)  MG Tab Take 2 Tabs by mouth every bedtime.     • acetaminophen (TYLENOL) 500 MG Tab Take 1,000 mg by mouth every morning.     • Melatonin 10 MG Tab Take 1 Tab by mouth every bedtime.     • vitamin D (CHOLECALCIFEROL) 1000 UNIT  "Tab Take 1,000 Units by mouth every morning.     • POTASSIUM GLUCONATE Take 1 Tab by mouth every morning. Pt unsure of dose     • Calcium Carbonate-Vitamin D (CALCIUM + D PO) Take 1 Tab by mouth every bedtime.       Current Facility-Administered Medications   Medication Dose Route Frequency Provider Last Rate Last Dose   • denosumab (PROLIA) subq injection 60 mg  60 mg Subcutaneous Q6 MO Deanna Escoto M.D.   60 mg at 06/06/19 1219     Past Medical History:   Diagnosis Date   • Adenocarcinoma of colon (HCC) 10/30/2018    From sessile serrated polyp 10/2018   • Anesthesia     pt states \"one new dr scraped my esophagus when they put the tube in and I started bleeding so they couldn't operate\"    • Atrial fibrillation [I48.91] 4/19/2016     pt denies    • Back pain 6/27/2012   • Blood clotting disorder (HCC) 2012    clot in leg   • Bowel habit changes     constipation    • Cancer (HCC)     skin, colon 2018   • Cataract     belia IOL    • Chronic back pain greater than 3 months duration    • Deep vein thrombosis (HCC) 3/8/2016    First occurrence in LLE in late 1970s Second occurrence further up in LLE in 2012, has been on AC since    • Essential hypertension, benign 6/27/2012   • GERD (gastroesophageal reflux disease) 6/27/2012   • Heart murmur    • Hyperlipidemia    • Hypertension     hx of, not currently    • Impaired fasting glucose 11/2/2017   • Mild aortic stenosis 11/2/2017   • Other and unspecified hyperlipidemia 6/27/2012   • Prediabetes    • Protein S deficiency (HCC) 11/2/2017    Noted in lab work 2013 as a part of work up at [facility]    • Rheumatoid arthritis involving multiple sites with positive rheumatoid factor (HCC) 03/14/2016    Dr. Escoto   • Rheumatoid nodule (HCC) 7/25/2017   • Right bundle branch block 6/27/2012    pt denies    • Urinary incontinence 11/2/2017     Past Surgical History:   Procedure Laterality Date   • HEMICOLECTOMY Right 12/13/2018    Procedure: OPEN RIGHT HEMICOLECTOMY;  " "Surgeon: Dash Bhagat M.D.;  Location: SURGERY [facility];  Service: General   • INGUINAL HERNIA REPAIR Right 2016    Procedure: INGUINAL HERNIA REPAIR - PRIMARY;  Surgeon: Mary Medina M.D.;  Location: SURGERY [facility];  Service:    • OTHER  2014    peroneal nerve surgery Dr. Castro    • OTHER ORTHOPEDIC SURGERY      left knee partial   • OTHER ORTHOPEDIC SURGERY      meniscus repair   • ATHROPLASTY      partial TKA with Dr. 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 All other systems reviewed and negative.        Objective   /70   Pulse 66   Temp 37 °C (98.6 °F)   Resp 14   Ht 1.6 m (5' 3\")   Wt 74.4 kg (164 lb)   SpO2 98%  Body mass index is 29.05 kg/m².    General: alert and oriented, pleasant, cooperative  HEENT: Normocephalic, atraumatic.   MSK: pain to palpation of right flank above hip   Psychiatric: appropriate mood and affect. Good insight and appropriate judgment     Assessment and Plan   The following treatment plan was discussed     1. Flank pain  Likely compensatory MSK strain given her pending left hip surgery. Discussed rest, trial of deep blue essential oil and seeing how things go after her hip surgery " next month.     Healthcare maintenance     Health Maintenance Due   Topic Date Due   • Annual Wellness Visit  1935       No follow-ups on file.    Eric Cook MD  Internal Medicine  [facility]

## 2019-08-22 NOTE — ED TRIAGE NOTES
".  Chief Complaint   Patient presents with   • RUQ Pain     Persistent pain for 2 weeks      Pt ambulate to triage with above complaint. Denies N/V/D, states pain is dull and constant, if \"I try to bend or do something it is a sharp jab.\" Pt states she feels fine otherwise. Pt has taken IBU and Tylenol without relief, did take Oxycodone 2 days ago with relief. Pt has history of ascending colon cancer with polyp removal.   Pt educated on triage process and returned to the lobby. Pt educated to alert RN if symptoms worsen.   "

## 2019-08-22 NOTE — DISCHARGE INSTRUCTIONS
Continue to take Tylenol as directed    Follow-up with your primary care provider in 48 hours    Return if you are acutely worse

## 2019-08-22 NOTE — ASSESSMENT & PLAN NOTE
She has been having ongoing flank pain for the past few weeks. Symptoms were worse enough by Wednesday so she went into the ER where she had unremarkable CT a/p. She has been less active today and her symptoms are better. Has tried a lidocaine patch which made her symptoms worse. Has been using her cane more. BUN was also noted to be elevated in the ER and she has been drinking more water.

## 2019-08-22 NOTE — ED PROVIDER NOTES
"ED Provider Note    CHIEF COMPLAINT  Chief Complaint   Patient presents with   • RUQ Pain     Persistent pain for 2 weeks       HPI  Marry Mathur is a 83 y.o. female who presents with right flank pain.  The patient states that the pain for the last 2 weeks.  She states is in the inferior aspect of her right rib cage.  She states is worse with certain movements.  She has not had any associated fevers nor vomiting.  She has not had any change in bowel or bladder function.  She states the pain is better with rest.  Currently the pain is mild in intensity.  The patient does have rheumatoid arthritis as well as a history of a DVT and does take Xarelto.    REVIEW OF SYSTEMS  See HPI for further details. All other systems are negative.     PAST MEDICAL HISTORY  Past Medical History:   Diagnosis Date   • Adenocarcinoma of colon (HCC) 10/30/2018    From sessile serrated polyp 10/2018   • Protein S deficiency (HCC) 11/2/2017    Noted in lab work 2013 as a part of work up at [facility]    • Urinary incontinence 11/2/2017   • Mild aortic stenosis 11/2/2017   • Impaired fasting glucose 11/2/2017   • Rheumatoid nodule (HCC) 7/25/2017   • Atrial fibrillation [I48.91] 4/19/2016     pt denies    • Rheumatoid arthritis involving multiple sites with positive rheumatoid factor (HCC) 03/14/2016    Dr. Escoto   • Deep vein thrombosis (HCC) 3/8/2016    First occurrence in LLE in late 1970s Second occurrence further up in LLE in 2012, has been on AC since    • Other and unspecified hyperlipidemia 6/27/2012   • GERD (gastroesophageal reflux disease) 6/27/2012   • Back pain 6/27/2012   • Right bundle branch block 6/27/2012    pt denies    • Essential hypertension, benign 6/27/2012   • Blood clotting disorder (HCC) 2012    clot in leg   • Anesthesia     pt states \"one new dr scraped my esophagus when they put the tube in and I started bleeding so they couldn't operate\"    • Bowel habit changes     constipation    • Cancer (HCC) "     skin, colon 2018   • Cataract     belia IOL    • Chronic back pain greater than 3 months duration    • Heart murmur    • Hyperlipidemia    • Hypertension     hx of, not currently    • Prediabetes        FAMILY HISTORY  [unfilled]    SOCIAL HISTORY  Social History     Socioeconomic History   • Marital status:      Spouse name: Not on file   • Number of children: Not on file   • Years of education: Not on file   • Highest education level: Not on file   Occupational History   • Not on file   Social Needs   • Financial resource strain: Not on file   • Food insecurity:     Worry: Not on file     Inability: Not on file   • Transportation needs:     Medical: Not on file     Non-medical: Not on file   Tobacco Use   • Smoking status: Never Smoker   • Smokeless tobacco: Never Used   Substance and Sexual Activity   • Alcohol use: No     Alcohol/week: 0.0 oz   • Drug use: No   • Sexual activity: Not Currently     Partners: Male     Comment:     Lifestyle   • Physical activity:     Days per week: Not on file     Minutes per session: Not on file   • Stress: Not on file   Relationships   • Social connections:     Talks on phone: Not on file     Gets together: Not on file     Attends Gnosticism service: Not on file     Active member of club or organization: Not on file     Attends meetings of clubs or organizations: Not on file     Relationship status: Not on file   • Intimate partner violence:     Fear of current or ex partner: Not on file     Emotionally abused: Not on file     Physically abused: Not on file     Forced sexual activity: Not on file   Other Topics Concern   • Not on file   Social History Narrative    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 Castro    • OTHER ORTHOPEDIC SURGERY  2014    left knee partial   • OTHER ORTHOPEDIC SURGERY  2011    meniscus repair   • ATHROPLASTY      partial TKA with Dr. Persaud   • CHOLECYSTECTOMY     • HYSTERECTOMY, TOTAL ABDOMINAL  1970's   • ROTATOR CUFF REPAIR Bilateral        CURRENT MEDICATIONS  Home Medications     Reviewed by Alexandra Cloud R.N. (Registered Nurse) on 08/21/19 at 2054  Med List Status: Not Addressed   Medication Last Dose Status   acetaminophen (TYLENOL) 500 MG Tab 8/21/2019 Active   atorvastatin (LIPITOR) 10 MG Tab 8/21/2019 Active   Calcium Carbonate-Vitamin D (CALCIUM + D PO)  Active   denosumab (PROLIA) subq injection 60 mg  Active   Diphenhydramine-APAP, sleep, (TYLENOL PM EXTRA STRENGTH)  MG Tab  Active   FLUOROURACIL EX  Active   hydroxychloroquine (PLAQUENIL) 200 MG Tab 8/21/2019 Active   lidocaine (LIDODERM) 5 % Patch  Active   LYRICA 100 MG Cap 8/21/2019 Active   Melatonin 10 MG Tab  Active   methylPREDNISolone (MEDROL) 4 MG Tab  Active   miconazole (MICOTIN) 2 % Cream  Active   nystatin/triamcinolone (MYCOLOG) 666057-2.1 UNIT/GM-% Cream  Active   omeprazole (PRILOSEC) 20 MG delayed-release capsule  Active   omeprazole (PRILOSEC) 20 MG Tablet Delayed Response delayed-release tablet 8/21/2019 Active   ondansetron (ZOFRAN ODT) 4 MG TABLET DISPERSIBLE  Active   POTASSIUM GLUCONATE 8/21/2019 Active   rivaroxaban (XARELTO) 20 MG Tab tablet 8/21/2019 Active   vitamin D (CHOLECALCIFEROL) 1000 UNIT Tab 8/21/2019 Active                ALLERGIES  Allergies   Allergen Reactions   • Sulfa Drugs Vomiting     RXN=young person       PHYSICAL EXAM  VITAL SIGNS: /92   Pulse 69   Temp 36.6 °C (97.9 °F) (Temporal)   Resp 20   Ht 1.6 m (5' 3\")   Wt 74.7 kg " (164 lb 10.9 oz)   SpO2 92%   BMI 29.17 kg/m²       Constitutional: Well developed, Well nourished, No acute distress, Non-toxic appearance.   HENT: Normocephalic, Atraumatic, Bilateral external ears normal, Oropharynx moist, No oral exudates, Nose normal.   Eyes: PERRLA, EOMI, Conjunctiva normal, No discharge.   Neck: Normal range of motion, No tenderness, Supple, No stridor.   Lymphatic: No lymphadenopathy noted.   Cardiovascular: Normal heart rate, Normal rhythm, No murmurs, No rubs, No gallops.   Thorax & Lungs: Normal breath sounds, No respiratory distress, No wheezing, No chest tenderness.   Abdomen: Right mid and upper quadrant discomfort to deep palpation, no rebound, no guarding, no distention  Skin: Warm, Dry, No erythema, No rash.   Back: No tenderness, No CVA tenderness.   Extremities: Intact distal pulses, No edema, No tenderness, No cyanosis, No clubbing.   Musculoskeletal: Good range of motion in all major joints. No tenderness to palpation or major deformities noted.   Neurologic: Alert & oriented x 3, Normal motor function, Normal sensory function, No focal deficits noted.   Psychiatric: Affect normal, Judgment normal, Mood normal.       RADIOLOGY/PROCEDURES  CT-RENAL COLIC EVALUATION(A/P W/O)   Final Result      1.  No acute abnormality within the abdomen or pelvis      2.  Prior cholecystectomy. No biliary dilation identified      3.  Nonobstructive 2 mm left upper pole calculus      4.  Right colonic postoperative change            COURSE & MEDICAL DECISION MAKING  Pertinent Labs & Imaging studies reviewed. (See chart for details)  This an 83-year-old female who presents the emerge department with right flank discomfort.  I suspect this is from a muscular source.  CT scan of the abdomen pelvis does not show any evidence of acute intra-abdominal inflammation nor is there any evidence of ureterolithiasis nor obstruction.  The patient's urinalysis does not support pyelonephritis nor urinary  source.  The laboratory analysis does not support right upper quadrant etiology nor pancreatitis.  At this time I do not have a clear etiology.  The patient is instructed to drink lots of fluids as her BUN is elevated which is concerning for some dehydration and her creatinine has been increasing as well.  The patient will follow up with her primary care provider in 48 hours and she will return to the emerge department if she is acutely worse.    FINAL IMPRESSION  1.  Right flank pain    Disposition  The patient will be discharged in stable condition      Electronically signed by: Mina Rodney, 8/21/2019 11:00 PM

## 2019-08-26 ENCOUNTER — HOSPITAL ENCOUNTER (OUTPATIENT)
Dept: RADIOLOGY | Facility: MEDICAL CENTER | Age: 84
End: 2019-08-26
Attending: INTERNAL MEDICINE
Payer: MEDICARE

## 2019-08-26 ENCOUNTER — OFFICE VISIT (OUTPATIENT)
Dept: RHEUMATOLOGY | Facility: MEDICAL CENTER | Age: 84
End: 2019-08-26
Payer: MEDICARE

## 2019-08-26 VITALS
HEART RATE: 68 BPM | TEMPERATURE: 97.9 F | WEIGHT: 167.11 LBS | RESPIRATION RATE: 14 BRPM | OXYGEN SATURATION: 96 % | DIASTOLIC BLOOD PRESSURE: 80 MMHG | SYSTOLIC BLOOD PRESSURE: 140 MMHG | BODY MASS INDEX: 29.6 KG/M2

## 2019-08-26 DIAGNOSIS — R07.81 RIB PAIN ON RIGHT SIDE: ICD-10-CM

## 2019-08-26 DIAGNOSIS — D68.59 PROTEIN S DEFICIENCY (HCC): ICD-10-CM

## 2019-08-26 DIAGNOSIS — C43.4 MALIGNANT MELANOMA OF NECK (HCC): ICD-10-CM

## 2019-08-26 DIAGNOSIS — Z79.899 LONG-TERM USE OF PLAQUENIL: ICD-10-CM

## 2019-08-26 DIAGNOSIS — C18.9 ADENOCARCINOMA OF COLON (HCC): ICD-10-CM

## 2019-08-26 DIAGNOSIS — R73.01 IMPAIRED FASTING GLUCOSE: ICD-10-CM

## 2019-08-26 DIAGNOSIS — M05.79 RHEUMATOID ARTHRITIS INVOLVING MULTIPLE SITES WITH POSITIVE RHEUMATOID FACTOR (HCC): ICD-10-CM

## 2019-08-26 DIAGNOSIS — I10 ESSENTIAL HYPERTENSION, BENIGN: ICD-10-CM

## 2019-08-26 DIAGNOSIS — Z79.01 CHRONIC ANTICOAGULATION: ICD-10-CM

## 2019-08-26 DIAGNOSIS — M81.0 OSTEOPOROSIS, UNSPECIFIED OSTEOPOROSIS TYPE, UNSPECIFIED PATHOLOGICAL FRACTURE PRESENCE: ICD-10-CM

## 2019-08-26 PROCEDURE — 99214 OFFICE O/P EST MOD 30 MIN: CPT | Performed by: INTERNAL MEDICINE

## 2019-08-26 PROCEDURE — 71101 X-RAY EXAM UNILAT RIBS/CHEST: CPT | Mod: RT

## 2019-08-26 NOTE — PROGRESS NOTES
Chief Complaint- joint pain     Subjective:   Marry Mathur is a 83 y.o. female here today for follow up of rheumatological issues    This is a follow-up visit for this patient who is seen in this clinic for osteoarthritis, patient does have a diagnosis of rheumatoid arthritis in the remote past status post a number of Biologics and DMARDs for which patient  for which most have been stopped because of the development of melanoma and also development of colonic adenocarcinoma status post resection December 2019.  Patient is currently only on Plaquenil at 200 mg p.o. twice daily.  Next ophthalmology evaluation pending September 2019.    Patient here today complaining of 3 weeks of right lower rib pain, denies any trauma denies any falls denies any twisting of her back.  Patient status post emergency room visit status post CT scan of abdomen which did not show any pathology.     Patient scheduled for left CARLYLE September 2019 because of a left hip MRI indicating extensive DJD and labral tear.     Other issues include a finding of osteoporosis on patient's bone density scan from March 2018, patient was not able to tolerate Fosamax because of a history of Araya's esophagus, patient currently on Prolia 60 mg subcu every 6 months with benefit.        Additional comorbidities include diabetes for which patient  takes metformin, also with a diagnosis of spinal stenosis with intermittent weakness in her legs with progressive numbness and weakness in the left leg.  Patient also with protein S deficiency with a history of DVT on chronic anticoagulation.       Left TKA         S/p Remicade-stopped because of hx of melanoma about 1996 and has multiple other skin cancers  S/p Orencia-lost efficacy  S/p Humira-stopped because of recurrence of melanoma October 2017  S/p MTX-stopped because of development of skin cancer/melanoma October 2017  S/p NSAIDS-relatively contraindicated as patient is on chronic  anticoagulation   S/p xeljanz-stopped because of the development of colonic adenocarcinoma  S/p fosamax-unable to tolerate-GI upset      G6PD 13.6 adequate 5/2019  DEXA 3/18/2016 T scores 1.1, -1.1   FRAX 3/18/2016 major osteoporotic fracture risk 14.3%, hip fracture risks 3.7%  DEXA 3/23/2018 T scores 0.2, -1.4  FRAX 3/23/2018 major osteoporotic fracture risk 19.3%, hip fracture risk 6.1%  Prolia started 6/2019  Echocardiogram 7/2012 [facility]  RF neg 3/2014 [facility]; RF neg 6/2014 [facility]; RF neg 6/2016  CCP neg 3/2014 [facility]; CCP neg 6/2014 [facility]; CCP neg 6/2016  Uric acid 4.7 3/2014 [facility];Uric acid 4.5 6/2016  Hep B neg 6/2016  Quantiferon Gold neg 6/2014 [facility]; Quantiferon Gold neg 6/2016  Hand x-rays 3/2016-indicate osteoarthritis  Feet x-rays 3/2016-indicate osteoarthritis     Corticosteroid Therapy Informed Consent signed 1/17/2019-copy given to patient          Current medicines (including changes today)  Current Outpatient Medications   Medication Sig Dispense Refill   • hydroxychloroquine (PLAQUENIL) 200 MG Tab 1 tab po bid 180 Tab 0   • LYRICA 100 MG Cap      • omeprazole (PRILOSEC) 20 MG delayed-release capsule      • lidocaine (LIDODERM) 5 % Patch Apply 1 Patch to skin as directed every 24 hours. 90 Patch 0   • rivaroxaban (XARELTO) 20 MG Tab tablet Take 1 Tab by mouth with dinner. 90 Tab 1   • atorvastatin (LIPITOR) 10 MG Tab Take 10 mg by mouth every evening.     • omeprazole (PRILOSEC) 20 MG Tablet Delayed Response delayed-release tablet Take 1 Tab by mouth every morning.     • Diphenhydramine-APAP, sleep, (TYLENOL PM EXTRA STRENGTH)  MG Tab Take 2 Tabs by mouth every bedtime.     • acetaminophen (TYLENOL) 500 MG Tab Take 1,000 mg by mouth every morning.     • Melatonin 10 MG Tab Take 1 Tab by mouth every bedtime.     • vitamin D (CHOLECALCIFEROL) 1000 UNIT Tab Take 1,000 Units by mouth every morning.     • POTASSIUM GLUCONATE Take 1 Tab by mouth every  morning. Pt unsure of dose     • Calcium Carbonate-Vitamin D (CALCIUM + D PO) Take 1 Tab by mouth every bedtime.     • nystatin/triamcinolone (MYCOLOG) 635095-6.1 UNIT/GM-% Cream      • methylPREDNISolone (MEDROL) 4 MG Tab 1 tab po qday (Patient not taking: Reported on 8/26/2019) 30 Tab 0   • ondansetron (ZOFRAN ODT) 4 MG TABLET DISPERSIBLE Take 1 Tab by mouth every 6 hours as needed for Nausea. (Patient not taking: Reported on 6/6/2019) 12 Tab 0   • miconazole (MICOTIN) 2 % Cream Apply to the affected area twice a day. (Patient not taking: Reported on 5/2/2019) 1 Tube 1   • FLUOROURACIL EX 1 Each by Apply externally route as needed.       Current Facility-Administered Medications   Medication Dose Route Frequency Provider Last Rate Last Dose   • denosumab (PROLIA) subq injection 60 mg  60 mg Subcutaneous Q6 MO Deanna Escoto M.D.   60 mg at 06/06/19 1219     She  has a past medical history of Adenocarcinoma of colon ([facility]) (10/30/2018), Anesthesia, Atrial fibrillation [I48.91] (4/19/2016), Back pain (6/27/2012), Blood clotting disorder ([facility]) (2012), Bowel habit changes, Cancer ([facility]), Cataract, Chronic back pain greater than 3 months duration, Deep vein thrombosis ([facility]) (3/8/2016), Essential hypertension, benign (6/27/2012), GERD (gastroesophageal reflux disease) (6/27/2012), Heart murmur, Hyperlipidemia, Hypertension, Impaired fasting glucose (11/2/2017), Mild aortic stenosis (11/2/2017), Other and unspecified hyperlipidemia (6/27/2012), Prediabetes, Protein S deficiency ([facility]) (11/2/2017), Rheumatoid arthritis involving multiple sites with positive rheumatoid factor ([facility]) (03/14/2016), Rheumatoid nodule ([facility]) (7/25/2017), Right bundle branch block (6/27/2012), and Urinary incontinence (11/2/2017).    ROS   Other than what is mentioned in HPI or physical exam, there is no history of headaches, double vision or blurred vision. No temporal tenderness or jaw claudication. No history of cataracts or glaucoma. No  trouble swallowing difficulties or sore throats.  No chest complaints including chest pain, cough or sputum production. No GI complaints including nausea, vomiting, change in bowel habits, or past peptic ulcer disease. No history of blood in the stools. No urinary complaints including dysuria or frequency. No history of alopecia, photosensitivity, oral ulcerations, Raynaud's phenomena.       Objective:     /80   Pulse 68   Temp 36.6 °C (97.9 °F) (Temporal)   Resp 14   Wt 75.8 kg (167 lb 1.7 oz)   SpO2 96%  Body mass index is 29.6 kg/m².   Physical Exam:    Constitutional: Alert and oriented X3, patient is talkative with good eye contact.Skin: Warm, dry, good turgor, no rashes in visible areas.Eye: Equal, round and reactive, conjunctiva clear, lids normal EOM intactENMT: Lips without lesions, good dentition, no oropharyngeal ulcers, moist buccal mucosa, pinna without deformityNeck: Trachea midline, no masses, no thyromegaly.Lymph:  No cervical lymphadenopathy, no axillary lymphadenopathy, no supraclavicular lymphadenopathyRespiratory: Unlabored respiratory effort, lungs clear to auscultation, no wheezes, no ronchi.Cardiovascular: Normal S1, S2, no murmur, no edema.Abdomen: Soft, non-tender, no masses, no hepatosplenomegaly, there is palpable tenderness deep muscles right CVA below and around T10 or so, no rebound no guarding,.Psych: Alert and oriented x3, normal affect and mood.Neuro: Cranial nerves 2-12 are grossly intact, no loss of sensation LEExt:no joint laxity noted in bilateral arms, no joint laxity noted in bilateral legs, patient does have extensive Heberden's nodes on most DIP joints of both hands, also Ana's nodes bilateral fifth PIP joints, no evidence of swan-neck or boutonniere deformities no sausage digits no dactylitis, toes without crossover toes without splay toes, patient has a well-healed left TKA,    Lab Results   Component Value Date/Time    QNTTBGOLD Negative 06/29/2016 02:03 PM      Lab Results   Component Value Date/Time    HEPBCORIGM Negative 06/29/2016 02:03 PM    HEPBSAG Negative 06/29/2016 02:03 PM     Lab Results   Component Value Date/Time    SODIUM 143 08/21/2019 10:03 PM    POTASSIUM 4.0 08/21/2019 10:03 PM    CHLORIDE 105 08/21/2019 10:03 PM    CO2 28 08/21/2019 10:03 PM    GLUCOSE 100 (H) 08/21/2019 10:03 PM    BUN 35 (H) 08/21/2019 10:03 PM    CREATININE 1.10 08/21/2019 10:03 PM      Lab Results   Component Value Date/Time    WBC 6.4 08/21/2019 10:03 PM    RBC 4.85 08/21/2019 10:03 PM    HEMOGLOBIN 14.8 08/21/2019 10:03 PM    HEMATOCRIT 44.9 08/21/2019 10:03 PM    MCV 92.6 08/21/2019 10:03 PM    MCH 30.5 08/21/2019 10:03 PM    MCHC 33.0 (L) 08/21/2019 10:03 PM    MPV 11.5 08/21/2019 10:03 PM    NEUTSPOLYS 67.10 08/21/2019 10:03 PM    LYMPHOCYTES 21.90 (L) 08/21/2019 10:03 PM    MONOCYTES 8.20 08/21/2019 10:03 PM    EOSINOPHILS 2.00 08/21/2019 10:03 PM    BASOPHILS 0.60 08/21/2019 10:03 PM      Lab Results   Component Value Date/Time    CALCIUM 9.3 08/21/2019 10:03 PM    ASTSGOT 18 08/21/2019 10:03 PM    ALTSGPT 12 08/21/2019 10:03 PM    ALKPHOSPHAT 46 08/21/2019 10:03 PM    TBILIRUBIN 0.6 08/21/2019 10:03 PM    ALBUMIN 4.3 08/21/2019 10:03 PM    TOTPROTEIN 7.3 08/21/2019 10:03 PM     Lab Results   Component Value Date/Time    URICACID 4.5 06/29/2016 02:03 PM    RHEUMFACTN <10 06/29/2016 02:03 PM    CCPANTIBODY 4 06/29/2016 02:03 PM     Lab Results   Component Value Date/Time    SEDRATEWES 11 07/07/2018 11:25 AM     Lab Results   Component Value Date/Time    HBA1C 5.5 05/16/2016 07:50 AM     Lab Results   Component Value Date/Time    G6PD 13.6 05/20/2019 10:01 AM     Lab Results   Component Value Date/Time    CPKTOTAL 61 06/29/2016 02:03 PM     Results for orders placed during the hospital encounter of 03/18/16   DX-JOINT SURVEY-HANDS SINGLE VIEW    Impression Multiple bilateral sites of osteoarthritis     Results for orders placed during the hospital encounter of 03/18/16    DX-JOINT SURVEY-FEET SINGLE VIEW    Impression 1.  Bilateral midfoot and forefoot osteoarthritis    2.  Bilateral bunion    3.  Prior fusion across the right 2nd proximal interphalangeal joint    4.  Foreign body or opaque material between 1st and 2nd phalanges     Results for orders placed during the hospital encounter of 03/23/18   DS-BONE DENSITY STUDY (DEXA)    Impression According to the World Health Organization classification, bone mineral density of this patient is osteopenia with increased risk of fracture. Percentage decrease in bone mineral density in the lumbar region is statistically significant.        10-year Probability of Fracture:  Major Osteoporotic     19.3%  Hip     6.1%  Population      [place] ()    Based on left femur neck BMD          INTERPRETING LOCATION:  [address] [place] [address], [id number]     Results for orders placed during the hospital encounter of 01/14/09   DX-KNEE COMPLETE 4+    Impression IMPRESSION:     MILD PATELLOFEMORAL AND MEDIAL FEMOROTIBIAL COMPARTMENT DEGENERATIVE   OSTEOARTHROSIS.        GEK:leonie     Read By ALEX WISE MD on Jan 14 2009 10:01AM  : DANA Transcription Date: Jovi 15 2009  8:11AM  THIS DOCUMENT HAS BEEN ELECTRONICALLY SIGNED BY: ALEX WISE MD on   Jan 16 2009  1:32PM        Results for orders placed during the hospital encounter of 01/14/09   DX-SHOULDER 2+    Impression IMPRESSION:     NORMAL RADIOGRAPHS OF THE LEFT SHOULDER WITH NOTE MADE OF MINIMAL   DEGENERATIVE OSTEOARTHROSIS OF THE GLENOHUMERAL JOINT WITH MINIMAL   SPURRING.           Results for orders placed during the hospital encounter of 08/18/18   MR-LUMBAR SPINE-W/O    Impression Mild interval worsening L3/4 right paracentral protrusion results in mild worsening lateral recess stenosis    Otherwise stable multilevel degenerative change resulting in foraminal predominate stenoses, greatest is moderate to severe on the right at L3/4.    Lesser stenoses at other  levels as detailed above    Stable L4/5 grade 1 anterolisthesis of secondary to severe facet arthropathy. Stable minimal degenerative retrolisthesis of the upper lumbar levels    Mature L5/S1 interbody fusion     Results for orders placed during the hospital encounter of 10/08/18   MR-KNEE-W/O LEFT    Impression Status post medial knee hemiarthroplasty without complication identified    Mild patellofemoral and lateral femorotibial osteoarthritis with some cartilage thinning and spurring but no full-thickness defects or areas of marrow edema are identified     Results for orders placed during the hospital encounter of 01/14/09   DX-CERVICAL SPINE-2 OR 3 VIEWS    Impression IMPRESSION:     DEGENERATIVE DISC AND FACET ARTHROPATHY C5-6, C6-7, AND DEGENERATIVE   FACET ARTHROPATHY AT C7-T1.           Assessment and Plan:     1. Rheumatoid arthritis involving multiple sites with positive rheumatoid factor (HCC)  Doing well with Plaquenil at 200 mg p.o. twice daily only,  - [id number] (WITH 1-VIEW CXR) RIGHT; Future    2. Long-term use of Plaquenil  On Plaquenil 20 mg p.o. twice daily of note G6PD levels are adequate patient does need CBC every 6 months next CBC is due about February 2020  Patient is scheduled for ophthalmology evaluation September 2020  - [id number] (WITH 1-VIEW CXR) RIGHT; Future    3. Rib pain on right side  Today we will do a right lower rib x-ray for evaluation of any pathology i.e. stress fracture versus other pathology  - [id number] (WITH 1-VIEW CXR) RIGHT; Future    4. Adenocarcinoma of colon (HCC)  Status post resection December 2018.  - [id number] (WITH 1-VIEW CXR) RIGHT; Future    5. Malignant melanoma of neck (HCC)  - [id number] (WITH 1-VIEW CXR) RIGHT; Future    6. Osteoporosis, unspecified osteoporosis type, unspecified pathological fracture presence  Last DEXA March 2018 Next DEXA March 2020   continue calcium citrate 1200 mg by mouth daily and vitamin  D about 2000 units by mouth daily and magnesium 200 mg by mouth daily  Currently on Prolia 60 mg subcu every 6 months with benefit    7. Chronic anticoagulation  This will impact with kind of medications we can use for this patient's arthritis, NSAIDs are contraindicated because of increased risk of bleeding while on chronic anticoagulation    8. Protein S deficiency (HCC)  On chronic anticoagulation  This will impact with kind of medications we can use for this patient's arthritis, NSAIDs are contraindicated because of increased risk of bleeding while on chronic anticoagulation    9. Essential hypertension, benign  May impact the type of medications we can use for this patient's arthritis. We will have to keep this under advisement.    10. Impaired fasting glucose  High blood sugar can exacerbate inflammatory state of patient's arthritis, discussed with patient the need to monitor and control blood sugars, patient states understanding    Followup: Return in about 2 months (around 10/26/2019). or sooner jose eduardo [place] Kareen  was seen 30 minutes face-to-face of which more than 50% of the time was spent counseling the patient (excluding time for procedures)  regarding  rheumatological condition and care. Therapy was discussed in detail.      Please note that this dictation was created using voice recognition software. I have made every reasonable attempt to correct obvious errors, but I expect that there are errors of grammar and possibly content that I did not discover before finalizing the note.

## 2019-08-27 ENCOUNTER — TELEPHONE (OUTPATIENT)
Dept: RHEUMATOLOGY | Facility: MEDICAL CENTER | Age: 84
End: 2019-08-27

## 2019-08-27 DIAGNOSIS — R25.2 SPASM: ICD-10-CM

## 2019-08-27 RX ORDER — TIZANIDINE 4 MG/1
TABLET ORAL
Qty: 120 TAB | Refills: 0 | Status: SHIPPED | OUTPATIENT
Start: 2019-08-27 | End: 2019-12-02

## 2019-08-27 NOTE — TELEPHONE ENCOUNTER
Please notify patient that her rib x-rays are normal, they also did a chest x-ray as well and did not see any abnormalities in the chest x-ray.  Not sure why she is having pain where she is, wonder if may be just a muscle spasm?  She would like to try I can call in a muscle relaxer or she can get a massage or use a heating pad in the area or an ice pack whichever feels better to see if she can relieve the pain in her right mid back area

## 2019-08-27 NOTE — TELEPHONE ENCOUNTER
Spoke with pt and relayed your message. She would like to try the muscle relaxer please. Call that into [facility] in [place]. Thank you

## 2019-10-01 ENCOUNTER — APPOINTMENT (RX ONLY)
Dept: URBAN - METROPOLITAN AREA CLINIC 31 | Facility: CLINIC | Age: 84
Setting detail: DERMATOLOGY
End: 2019-10-01

## 2019-10-01 PROBLEM — C44.91 BASAL CELL CARCINOMA OF SKIN, UNSPECIFIED: Status: ACTIVE | Noted: 2019-10-01

## 2019-10-01 PROCEDURE — ? MOHS SURGERY PHONE CONSULTATION

## 2019-10-01 NOTE — PROCEDURE: MOHS SURGERY PHONE CONSULTATION
Additional Information?: Pt is allergic to sulfa
If Yes- What Blood Thinners Do They Take?: Xarelto
Office Location Of Mohs Surgery: [facility]
Detail Level: Simple
Pathology Accession #: [id number]
Does The Patient Take Blood Thinners?: Yes
Patient's Insurance: Medicare//P
Does The Patient Have An Artificial Heart Valve?: No
Patient Reported Location: right nasal ala
If Yes- What Is The Patient's Pharmacy Number?: [phone]
Referring Provider: Amy Schoening
Which Antibiotic Do They Take For Surgical Prophylaxis?: Amoxicillin (2 grams)

## 2019-10-08 ENCOUNTER — OFFICE VISIT (OUTPATIENT)
Dept: RHEUMATOLOGY | Facility: MEDICAL CENTER | Age: 84
End: 2019-10-08
Payer: MEDICARE

## 2019-10-08 VITALS
SYSTOLIC BLOOD PRESSURE: 120 MMHG | WEIGHT: 172.84 LBS | TEMPERATURE: 97.1 F | DIASTOLIC BLOOD PRESSURE: 68 MMHG | OXYGEN SATURATION: 97 % | BODY MASS INDEX: 30.62 KG/M2 | RESPIRATION RATE: 14 BRPM | HEART RATE: 70 BPM

## 2019-10-08 DIAGNOSIS — M79.89 LEG SWELLING: ICD-10-CM

## 2019-10-08 DIAGNOSIS — C18.9 ADENOCARCINOMA OF COLON (HCC): ICD-10-CM

## 2019-10-08 DIAGNOSIS — M05.79 RHEUMATOID ARTHRITIS INVOLVING MULTIPLE SITES WITH POSITIVE RHEUMATOID FACTOR (HCC): ICD-10-CM

## 2019-10-08 DIAGNOSIS — C43.4 MALIGNANT MELANOMA OF NECK (HCC): ICD-10-CM

## 2019-10-08 DIAGNOSIS — I10 ESSENTIAL HYPERTENSION, BENIGN: ICD-10-CM

## 2019-10-08 DIAGNOSIS — Z79.899 LONG-TERM USE OF PLAQUENIL: ICD-10-CM

## 2019-10-08 DIAGNOSIS — Z79.01 CHRONIC ANTICOAGULATION: ICD-10-CM

## 2019-10-08 DIAGNOSIS — M81.0 OSTEOPOROSIS, UNSPECIFIED OSTEOPOROSIS TYPE, UNSPECIFIED PATHOLOGICAL FRACTURE PRESENCE: ICD-10-CM

## 2019-10-08 DIAGNOSIS — D68.59 PROTEIN S DEFICIENCY (HCC): ICD-10-CM

## 2019-10-08 PROCEDURE — 99214 OFFICE O/P EST MOD 30 MIN: CPT | Performed by: INTERNAL MEDICINE

## 2019-10-08 NOTE — PROGRESS NOTES
Chief Complaint- joint pain     Subjective:   Marry Mathur is a 84 y.o. female here today for follow up of rheumatological issues  This is a follow-up visit for this patient who is seen in this clinic for osteoarthritis, there is a remote diagnosis of rheumatoid arthritis in the past with patient negative serologies negative x-rays.  We treat patient with Plaquenil 200 mg p.o. twice daily as patient is on chronic anticoagulation and unable to take NSAIDs.  Patient denies any side effects from the medication, denies any unexplained weight loss, denies any fevers of unknown etiology, denies any GI upset, denies any rashes, denies any new joint swelling, denies recurrent infections.  Patient states that her last ophthalmology evaluation was just done next ophthalmology evaluation March 2020.      Since last visit, patient underwent a left CARLYLE and since that time patient has had swelling in her left lower extremity of unclear etiology.  Patient states that the left lower extremity swelling did start before her left CARLYLE in the beginning of September 2019 after returning from [place].  The swelling subsequently got worse after her left CARLYLE September 16, 2019.  No ultrasounds have been done.        Other issues include a finding of osteoporosis on patient's bone density scan from March 2018, patient was not able to tolerate Fosamax because of a history of Araya's esophagus, patient currently on Prolia 60 mg subcu every 6 months with benefit.        Additional comorbidities include diabetes for which patient  takes metformin, also with a diagnosis of spinal stenosis with intermittent weakness in her legs with progressive numbness and weakness in the left leg.  Patient also with protein S deficiency with a history of DVT on chronic anticoagulation.       Left TKA   Left CARLYLE         S/p Remicade-stopped because of hx of melanoma about 1996 and has multiple other skin cancers  S/p Orencia-lost efficacy  S/p  Humira-stopped because of recurrence of melanoma October 2017  S/p MTX-stopped because of development of skin cancer/melanoma October 2017  S/p NSAIDS-relatively contraindicated as patient is on chronic anticoagulation   S/p xeljanz-stopped because of the development of colonic adenocarcinoma  S/p fosamax-unable to tolerate-GI upset      G6PD 13.6 adequate 5/2019  DEXA 3/18/2016 T scores 1.1, -1.1   FRAX 3/18/2016 major osteoporotic fracture risk 14.3%, hip fracture risks 3.7%  DEXA 3/23/2018 T scores 0.2, -1.4  FRAX 3/23/2018 major osteoporotic fracture risk 19.3%, hip fracture risk 6.1%  Prolia started 6/2019  Echocardiogram 7/2012 [facility]  RF neg 3/2014 [facility]; RF neg 6/2014 [facility]; RF neg 6/2016  CCP neg 3/2014 [facility]; CCP neg 6/2014 [facility]; CCP neg 6/2016  Uric acid 4.7 3/2014 [facility];Uric acid 4.5 6/2016  Hep B neg 6/2016  Quantiferon Gold neg 6/2014 [facility]; Quantiferon Gold neg 6/2016  Hand x-rays 3/2016-indicate osteoarthritis  Feet x-rays 3/2016-indicate osteoarthritis     Corticosteroid Therapy Informed Consent signed 1/17/2019-copy given to patient           Current medicines (including changes today)  Current Outpatient Medications   Medication Sig Dispense Refill   • tizanidine (ZANAFLEX) 4 MG Tab 1 tab p.o. every 6 hours as needed muscle spasm, do not drive or use sharp equipment as will make you sleepy 120 Tab 0   • hydroxychloroquine (PLAQUENIL) 200 MG Tab 1 tab po bid 180 Tab 0   • LYRICA 100 MG Cap      • omeprazole (PRILOSEC) 20 MG delayed-release capsule      • nystatin/triamcinolone (MYCOLOG) 971749-2.1 UNIT/GM-% Cream      • lidocaine (LIDODERM) 5 % Patch Apply 1 Patch to skin as directed every 24 hours. 90 Patch 0   • rivaroxaban (XARELTO) 20 MG Tab tablet Take 1 Tab by mouth with dinner. 90 Tab 1   • miconazole (MICOTIN) 2 % Cream Apply to the affected area twice a day. 1 Tube 1   • atorvastatin (LIPITOR) 10 MG Tab Take 10 mg by mouth every evening.     •  omeprazole (PRILOSEC) 20 MG Tablet Delayed Response delayed-release tablet Take 1 Tab by mouth every morning.     • FLUOROURACIL EX 1 Each by Apply externally route as needed.     • acetaminophen (TYLENOL) 500 MG Tab Take 1,000 mg by mouth every morning.     • Melatonin 10 MG Tab Take 1 Tab by mouth every bedtime.     • vitamin D (CHOLECALCIFEROL) 1000 UNIT Tab Take 1,000 Units by mouth every morning.     • POTASSIUM GLUCONATE Take 1 Tab by mouth every morning. Pt unsure of dose     • Calcium Carbonate-Vitamin D (CALCIUM + D PO) Take 1 Tab by mouth every bedtime.     • methylPREDNISolone (MEDROL) 4 MG Tab 1 tab po qday (Patient not taking: Reported on 8/26/2019) 30 Tab 0   • ondansetron (ZOFRAN ODT) 4 MG TABLET DISPERSIBLE Take 1 Tab by mouth every 6 hours as needed for Nausea. (Patient not taking: Reported on 6/6/2019) 12 Tab 0   • Diphenhydramine-APAP, sleep, (TYLENOL PM EXTRA STRENGTH)  MG Tab Take 2 Tabs by mouth every bedtime.       Current Facility-Administered Medications   Medication Dose Route Frequency Provider Last Rate Last Dose   • denosumab (PROLIA) subq injection 60 mg  60 mg Subcutaneous Q6 MO Deanna Escoto M.D.   60 mg at 06/06/19 1219     She  has a past medical history of Adenocarcinoma of colon ([facility]) (10/30/2018), Anesthesia, Atrial fibrillation [I48.91] (4/19/2016), Back pain (6/27/2012), Blood clotting disorder ([facility]) (2012), Bowel habit changes, Cancer ([facility]), Cataract, Chronic back pain greater than 3 months duration, Deep vein thrombosis ([facility]) (3/8/2016), Essential hypertension, benign (6/27/2012), GERD (gastroesophageal reflux disease) (6/27/2012), Heart murmur, Hyperlipidemia, Hypertension, Impaired fasting glucose (11/2/2017), Mild aortic stenosis (11/2/2017), Other and unspecified hyperlipidemia (6/27/2012), Prediabetes, Protein S deficiency ([facility]) (11/2/2017), Rheumatoid arthritis involving multiple sites with positive rheumatoid factor ([facility]) (03/14/2016), Rheumatoid nodule  (HCC) (7/25/2017), Right bundle branch block (6/27/2012), and Urinary incontinence (11/2/2017).    ROS   Other than what is mentioned in HPI or physical exam, there is no history of headaches, double vision or blurred vision. No temporal tenderness or jaw claudication. No trouble swallowing difficulties or sore throats.  No chest complaints including chest pain, cough or sputum production. No GI complaints including nausea, vomiting, change in bowel habits, or past peptic ulcer disease. No history of blood in the stools. No urinary complaints including dysuria or frequency. No history of alopecia, photosensitivity, oral ulcerations, Raynaud's phenomena.       Objective:     /68   Pulse 70   Temp 36.2 °C (97.1 °F) (Temporal)   Resp 14   Wt 78.4 kg (172 lb 13.5 oz)   SpO2 97%  Body mass index is 30.62 kg/m².   Physical Exam:    Constitutional: Alert and oriented X3, patient is talkative with good eye contact.Skin: Warm, dry, good turgor, no rashes in visible areas.Eye: Equal, round and reactive, conjunctiva clear, lids normal EOM intactENMT: Lips without lesions, good dentition, no oropharyngeal ulcers, moist buccal mucosa, pinna without deformityNeck: Trachea midline, no masses, no thyromegaly.Lymph:  No cervical lymphadenopathy, no axillary lymphadenopathy, no supraclavicular lymphadenopathyRespiratory: Unlabored respiratory effort, lungs clear to auscultation, no wheezes, no ronchi.Cardiovascular: Normal S1, S2, no murmur, no edema.Abdomen: Soft, non-tender, no masses, no hepatosplenomegaly.Psych: Alert and oriented x3, normal affect and mood.Neuro: Cranial nerves 2-12 are grossly intact, no loss of sensation LEExt:no joint laxity noted in bilateral arms, no joint laxity noted in bilateral legs, patient does have swelling of the left lower extremity predominantly below the knee, not isolated to joints, there is a well-healed left TKA incision site, there is Ana's nodes bilateral fifth PIP joints, no  evidence of swan-neck or boutonniere deformities no sausage digits no dactylitis, toes without crossover toes without splay toes,    Lab Results   Component Value Date/Time    QNTTBGOLD Negative 06/29/2016 02:03 PM     Lab Results   Component Value Date/Time    HEPBCORIGM Negative 06/29/2016 02:03 PM    HEPBSAG Negative 06/29/2016 02:03 PM     No results found for: HEPCAB  Lab Results   Component Value Date/Time    SODIUM 143 08/21/2019 10:03 PM    POTASSIUM 4.0 08/21/2019 10:03 PM    CHLORIDE 105 08/21/2019 10:03 PM    CO2 28 08/21/2019 10:03 PM    GLUCOSE 100 (H) 08/21/2019 10:03 PM    BUN 35 (H) 08/21/2019 10:03 PM    CREATININE 1.10 08/21/2019 10:03 PM      Lab Results   Component Value Date/Time    WBC 6.4 08/21/2019 10:03 PM    RBC 4.85 08/21/2019 10:03 PM    HEMOGLOBIN 14.8 08/21/2019 10:03 PM    HEMATOCRIT 44.9 08/21/2019 10:03 PM    MCV 92.6 08/21/2019 10:03 PM    MCH 30.5 08/21/2019 10:03 PM    MCHC 33.0 (L) 08/21/2019 10:03 PM    MPV 11.5 08/21/2019 10:03 PM    NEUTSPOLYS 67.10 08/21/2019 10:03 PM    LYMPHOCYTES 21.90 (L) 08/21/2019 10:03 PM    MONOCYTES 8.20 08/21/2019 10:03 PM    EOSINOPHILS 2.00 08/21/2019 10:03 PM    BASOPHILS 0.60 08/21/2019 10:03 PM      Lab Results   Component Value Date/Time    CALCIUM 9.3 08/21/2019 10:03 PM    ASTSGOT 18 08/21/2019 10:03 PM    ALTSGPT 12 08/21/2019 10:03 PM    ALKPHOSPHAT 46 08/21/2019 10:03 PM    TBILIRUBIN 0.6 08/21/2019 10:03 PM    ALBUMIN 4.3 08/21/2019 10:03 PM    TOTPROTEIN 7.3 08/21/2019 10:03 PM     Lab Results   Component Value Date/Time    URICACID 4.5 06/29/2016 02:03 PM    RHEUMFACTN <10 06/29/2016 02:03 PM    CCPANTIBODY 4 06/29/2016 02:03 PM     Lab Results   Component Value Date/Time    SEDRATEWES 11 07/07/2018 11:25 AM     Lab Results   Component Value Date/Time    HBA1C 5.5 05/16/2016 07:50 AM     Lab Results   Component Value Date/Time    G6PD 13.6 05/20/2019 10:01 AM     Lab Results   Component Value Date/Time    CPKTOTAL 61 06/29/2016 02:03  PM     Results for orders placed during the hospital encounter of 03/18/16   DX-JOINT SURVEY-HANDS SINGLE VIEW    Impression Multiple bilateral sites of osteoarthritis     Results for orders placed during the hospital encounter of 03/18/16   DX-JOINT SURVEY-FEET SINGLE VIEW    Impression 1.  Bilateral midfoot and forefoot osteoarthritis    2.  Bilateral bunion    3.  Prior fusion across the right 2nd proximal interphalangeal joint    4.  Foreign body or opaque material between 1st and 2nd phalanges     Results for orders placed during the hospital encounter of 03/23/18   DS-BONE DENSITY STUDY (DEXA)    Impression According to the World Health Organization classification, bone mineral density of this patient is osteopenia with increased risk of fracture. Percentage decrease in bone mineral density in the lumbar region is statistically significant.        10-year Probability of Fracture:  Major Osteoporotic     19.3%  Hip     6.1%  Population      [place] ()    Based on left femur neck BMD          INTERPRETING LOCATION:  [address] [place] [address], [id number]     Results for orders placed during the hospital encounter of 01/14/09   DX-KNEE COMPLETE 4+    Impression IMPRESSION:     MILD PATELLOFEMORAL AND MEDIAL FEMOROTIBIAL COMPARTMENT DEGENERATIVE   OSTEOARTHROSIS.        [facility]     Read By ALEX WISE MD on Jan 14 2009 10:01AM  : DANA Transcription Date: Jovi 15 2009  8:11AM  THIS DOCUMENT HAS BEEN ELECTRONICALLY SIGNED BY: ALEX WISE MD on   Jan 16 2009  1:32PM        Results for orders placed during the hospital encounter of 01/14/09   DX-SHOULDER 2+    Impression IMPRESSION:     NORMAL RADIOGRAPHS OF THE LEFT SHOULDER WITH NOTE MADE OF MINIMAL   DEGENERATIVE OSTEOARTHROSIS OF THE GLENOHUMERAL JOINT WITH MINIMAL   SPURRING.           Results for orders placed during the hospital encounter of 08/18/18   MR-LUMBAR SPINE-W/O    Impression Mild interval worsening L3/4 right paracentral  protrusion results in mild worsening lateral recess stenosis    Otherwise stable multilevel degenerative change resulting in foraminal predominate stenoses, greatest is moderate to severe on the right at L3/4.    Lesser stenoses at other levels as detailed above    Stable L4/5 grade 1 anterolisthesis of secondary to severe facet arthropathy. Stable minimal degenerative retrolisthesis of the upper lumbar levels    Mature L5/S1 interbody fusion     Results for orders placed during the hospital encounter of 10/08/18   MR-KNEE-W/O LEFT    Impression Status post medial knee hemiarthroplasty without complication identified    Mild patellofemoral and lateral femorotibial osteoarthritis with some cartilage thinning and spurring but no full-thickness defects or areas of marrow edema are identified     Results for orders placed during the hospital encounter of 01/14/09   DX-CERVICAL SPINE-2 OR 3 VIEWS    Impression IMPRESSION:     DEGENERATIVE DISC AND FACET ARTHROPATHY C5-6, C6-7, AND DEGENERATIVE   FACET ARTHROPATHY AT C7-T1.           Assessment and Plan:     1. Rheumatoid arthritis involving multiple sites with positive rheumatoid factor (HCC)  In remission currently only osteoarthritis treated with Plaquenil 200 mg p.o. twice daily as patient cannot take NSAIDs because of chronic anticoagulation  - US-EXTREMITY VENOUS LOWER UNILAT LEFT; Future    2. Long-term use of Plaquenil  Currently on Plaquenil 200 mg p.o. twice daily of note G6PD levels are adequate, patient needs CBC every 6 months next CBC will be due February 2020.  Patient up-to-date on ophthalmology evaluation next ophthalmology evaluation due March 2020    3. Leg swelling  On the left leg unclear etiology need to consider DVT in light of patient's immobility post left CARLYLE surgery, will schedule left lower extremity Doppler ultrasound of note patient has an appointment tomorrow at 10 AM for left lower extremity Doppler ultrasound for evaluation of DVT  -  US-EXTREMITY VENOUS LOWER UNILAT LEFT; Future    4. Osteoporosis, unspecified osteoporosis type, unspecified pathological fracture presence  Last DEXA March 2018 Next DEXA March 2020, patient currently on Prolia 60 mg subcu every 6 months   continue calcium citrate 1200 mg by mouth daily and vitamin D about 2000 units by mouth daily and magnesium 200 mg by mouth daily    5. Adenocarcinoma of colon (HCC)  Status post resection stable    6. Malignant melanoma of neck (HCC)    7. Chronic anticoagulation  This will impact with kind of medications we can use for this patient's arthritis, NSAIDs are contraindicated because of increased risk of bleeding while on chronic anticoagulation    8. Protein S deficiency (HCC)  On chronic anticoagulation  This will impact with kind of medications we can use for this patient's arthritis, NSAIDs are contraindicated because of increased risk of bleeding while on chronic anticoagulation    9. Essential hypertension, benign  May impact the type of medications we can use for this patient's arthritis. We will have to keep this under advisement.    Followup: Return in about 3 months (around 1/8/2020). or sooner jose eduardo Mathur  was seen 30 minutes face-to-face of which more than 50% of the time was spent counseling the patient (excluding time for procedures)  regarding  rheumatological condition and care. Therapy was discussed in detail.      Please note that this dictation was created using voice recognition software. I have made every reasonable attempt to correct obvious errors, but I expect that there are errors of grammar and possibly content that I did not discover before finalizing the note.

## 2019-10-09 ENCOUNTER — HOSPITAL ENCOUNTER (OUTPATIENT)
Dept: RADIOLOGY | Facility: MEDICAL CENTER | Age: 84
End: 2019-10-09
Attending: INTERNAL MEDICINE
Payer: MEDICARE

## 2019-10-09 ENCOUNTER — HOSPITAL ENCOUNTER (EMERGENCY)
Facility: MEDICAL CENTER | Age: 84
End: 2019-10-09
Attending: EMERGENCY MEDICINE
Payer: MEDICARE

## 2019-10-09 VITALS
HEIGHT: 63 IN | RESPIRATION RATE: 18 BRPM | DIASTOLIC BLOOD PRESSURE: 71 MMHG | BODY MASS INDEX: 30.51 KG/M2 | OXYGEN SATURATION: 96 % | WEIGHT: 172.18 LBS | TEMPERATURE: 98.2 F | HEART RATE: 70 BPM | SYSTOLIC BLOOD PRESSURE: 146 MMHG

## 2019-10-09 DIAGNOSIS — M05.79 RHEUMATOID ARTHRITIS INVOLVING MULTIPLE SITES WITH POSITIVE RHEUMATOID FACTOR (HCC): ICD-10-CM

## 2019-10-09 DIAGNOSIS — I82.5Y2 CHRONIC DEEP VEIN THROMBOSIS (DVT) OF PROXIMAL VEIN OF LEFT LOWER EXTREMITY (HCC): ICD-10-CM

## 2019-10-09 DIAGNOSIS — M79.89 LEG SWELLING: ICD-10-CM

## 2019-10-09 PROCEDURE — 93971 EXTREMITY STUDY: CPT | Mod: LT

## 2019-10-09 PROCEDURE — 99284 EMERGENCY DEPT VISIT MOD MDM: CPT

## 2019-10-09 ASSESSMENT — ENCOUNTER SYMPTOMS
FEVER: 0
SHORTNESS OF BREATH: 0
PALPITATIONS: 0

## 2019-10-09 ASSESSMENT — LIFESTYLE VARIABLES
HAVE YOU EVER FELT YOU SHOULD CUT DOWN ON YOUR DRINKING: NO
DO YOU DRINK ALCOHOL: NO

## 2019-10-09 NOTE — ED TRIAGE NOTES
"Amb w/ a walker to triage, sent by her arthritis doctor for further eval.  Pt is s/p L hip replacement on 9/16.  Pt reports that since the surgery she has been having increasing pain/redness/swelling to LLE.  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  Hx of DVT.  Pt takes Xarelto.  Pt denies sob.   "

## 2019-10-09 NOTE — ED PROVIDER NOTES
ED Provider Note    Scribed for KANDI Mendieta II* by Margarette Martinez. 10/9/2019  12:00 PM    Means of Arrival: Walk-in  History obtained by: Patient  Limitations: None    CHIEF COMPLAINT  Chief Complaint   Patient presents with   • Leg Pain   • Leg Swelling       HPI  Marry Mathur is a 84 y.o. Female with protein S deficiency who presents to the Emergency Department sent by Dr. Escoto (Rhematology) for findings on an ultrasound preformed this morning that indicated a chronic, non-occlusive thrombus that was adhered to the walls of the left mid femoral vein. She reports a prior history of DVT in 2011. She had a total knee replacement on 9/16/19. She does not note any known complications before or after the surgery. She currently endorses chronic left leg pain that has been occurring for multiple years, however, it recently became swollen shortly after surgery. She states has been on Xarelto for multiple years and has had no clots since starting. She was previously on coumadin, however, she did not like the constant rechecks and asked to be switched to Xarelto.       REVIEW OF SYSTEMS  Review of Systems   Constitutional: Negative for fever.   Respiratory: Negative for shortness of breath.    Cardiovascular: Negative for chest pain and palpitations.   Musculoskeletal:        Positive: lower extremity edema and chronic left leg pain   See HPI for further details.    PAST MEDICAL HISTORY   has a past medical history of Adenocarcinoma of colon (HCC) (10/30/2018), Anesthesia, Atrial fibrillation [I48.91] (4/19/2016), Back pain (6/27/2012), Blood clotting disorder (HCC) (2012), Bowel habit changes, Cancer (HCC), Cataract, Chronic back pain greater than 3 months duration, Deep vein thrombosis (HCC) (3/8/2016), Essential hypertension, benign (6/27/2012), GERD (gastroesophageal reflux disease) (6/27/2012), Heart murmur, Hyperlipidemia, Hypertension, Impaired fasting glucose (11/2/2017), Mild aortic  stenosis (11/2/2017), Other and unspecified hyperlipidemia (6/27/2012), Prediabetes, Protein S deficiency (HCC) (11/2/2017), Rheumatoid arthritis involving multiple sites with positive rheumatoid factor (HCC) (03/14/2016), Rheumatoid nodule (HCC) (7/25/2017), Right bundle branch block (6/27/2012), and Urinary incontinence (11/2/2017).    SOCIAL HISTORY  Social History     Tobacco Use   • Smoking status: Never Smoker   • Smokeless tobacco: Never Used   Substance and Sexual Activity   • Alcohol use: No     Alcohol/week: 0.0 oz   • Drug use: No   • Sexual activity: Not Currently     Partners: Male     Comment:         SURGICAL HISTORY   has a past surgical history that includes hysterectomy, total abdominal (1970's); athroplasty; cholecystectomy; other orthopedic surgery (2014); other orthopedic surgery (2011); inguinal hernia repair (Right, 9/23/2016); other (08/12/2014); rotator cuff repair (Bilateral); and hemicolectomy (Right, 12/13/2018).    CURRENT MEDICATIONS  Current Outpatient Medications:   •  tizanidine (ZANAFLEX) 4 MG Tab, 1 tab p.o. every 6 hours as needed muscle spasm, do not drive or use sharp equipment as will make you sleepy, Disp: 120 Tab, Rfl: 0  •  hydroxychloroquine (PLAQUENIL) 200 MG Tab, 1 tab po bid, Disp: 180 Tab, Rfl: 0  •  LYRICA 100 MG Cap, , Disp: , Rfl:   •  omeprazole (PRILOSEC) 20 MG delayed-release capsule, , Disp: , Rfl:   •  nystatin/triamcinolone (MYCOLOG) 659707-5.1 UNIT/GM-% Cream, , Disp: , Rfl:   •  methylPREDNISolone (MEDROL) 4 MG Tab, 1 tab po qday (Patient not taking: Reported on 8/26/2019), Disp: 30 Tab, Rfl: 0  •  lidocaine (LIDODERM) 5 % Patch, Apply 1 Patch to skin as directed every 24 hours., Disp: 90 Patch, Rfl: 0  •  rivaroxaban (XARELTO) 20 MG Tab tablet, Take 1 Tab by mouth with dinner., Disp: 90 Tab, Rfl: 1  •  ondansetron (ZOFRAN ODT) 4 MG TABLET DISPERSIBLE, Take 1 Tab by mouth every 6 hours as needed for Nausea. (Patient not taking: Reported on 6/6/2019),  "Disp: 12 Tab, Rfl: 0  •  miconazole (MICOTIN) 2 % Cream, Apply to the affected area twice a day., Disp: 1 Tube, Rfl: 1  •  atorvastatin (LIPITOR) 10 MG Tab, Take 10 mg by mouth every evening., Disp: , Rfl:   •  omeprazole (PRILOSEC) 20 MG Tablet Delayed Response delayed-release tablet, Take 1 Tab by mouth every morning., Disp: , Rfl:   •  FLUOROURACIL EX, 1 Each by Apply externally route as needed., Disp: , Rfl:   •  Diphenhydramine-APAP, sleep, (TYLENOL PM EXTRA STRENGTH)  MG Tab, Take 2 Tabs by mouth every bedtime., Disp: , Rfl:   •  acetaminophen (TYLENOL) 500 MG Tab, Take 1,000 mg by mouth every morning., Disp: , Rfl:   •  Melatonin 10 MG Tab, Take 1 Tab by mouth every bedtime., Disp: , Rfl:   •  vitamin D (CHOLECALCIFEROL) 1000 UNIT Tab, Take 1,000 Units by mouth every morning., Disp: , Rfl:   •  POTASSIUM GLUCONATE, Take 1 Tab by mouth every morning. Pt unsure of dose, Disp: , Rfl:   •  Calcium Carbonate-Vitamin D (CALCIUM + D PO), Take 1 Tab by mouth every bedtime., Disp: , Rfl:       ALLERGIES  Allergies   Allergen Reactions   • Sulfa Drugs Vomiting     RXN=young person       PHYSICAL EXAM  VITAL SIGNS: /76   Pulse 72   Temp 36.8 °C (98.2 °F) (Temporal)   Resp 16   Ht 1.6 m (5' 3\")   Wt 78.1 kg (172 lb 2.9 oz)   SpO2 96%   BMI 30.50 kg/m²       Pulse ox interpretation: I interpret this pulse ox as normal.  Constitutional: Alert in no apparent distress.  HENT: Normocephalic, Atraumatic, Bilateral external ears normal. Nose normal.   Eyes: Pupils are equal. Conjunctiva normal, non-icteric.   Heart: Regular rate and rythm, no murmurs.    Lungs: No respiratory distress, regular respirations. Clear to auscultation bilaterally.  Abdomen: Normal appearance, nondistended, nontender.  Skin: Warm, Dry, No erythema, No rash.   Neurologic: Alert, Grossly non-focal. No slurred speech. Moving extremities normally.   MSK: Left extremity: cleaned dry well healing surgical incision without  Erythema at " left hip, no tenderness. Mild amount of pitting edema 1+ compared to right lower extremity. No other acute skin changes.  Psychiatric: Affect normal, Judgment normal, Mood normal, Appears appropriate and not intoxicated.      COURSE & MEDICAL DECISION MAKING  Pertinent Labs & Imaging studies reviewed. (See chart for details)    12:00 PM This is a 84 y.o. female who presents with left pain and edema that is started after having a left total hip replacement last month.  She does have a history of chronic pain in that leg as well.  Ultrasound was done given swelling in left leg.  There is findings of an non-occlusive chronic DVT in the left femoral vein.  Given nonocclusive thrombus that appears chronic I believe it is possible that she could have developed a DVT shortly after surgery when she was not receiving anticoagulation.  Unclear how long this DVT has been present.  She is not endorsing any signs or symptoms of pulmonary embolism.  I believe that Xarelto is an adequate treatment for her.  I did discuss with her my recommendation for continuing long-term treatment with Xarelto.  I do not recommend changing to alternative anticoagulant such as warfarin.  I would like her to keep a close watch of the left leg if there becomes significantly more edema, erythema, chest pain, shortness of breath, fevers she should return to the emergency department for reevaluation.  She plans to continue intermittent elevation of her left leg and will apply Ace wrap intermittently as well to help reduce edema.  I encouraged her to stay mobile this will also help with edema.  She was agreeable with this plan and discharged in good condition.    The patient will return for worsening symptoms and is stable at the time of discharge. The patient verbalizes understanding and will comply. Guidance provided on appropriate use of medications.      DISPOSITION:  Patient will be discharged home in stable condition.    FOLLOW UP:  Eric Cook  M.D.  [address]  [facility] [address]  [phone]    Schedule an appointment as soon as possible for a visit   For re-evaluation within the next 1 week to re-examine leg for any changes      FINAL IMPRESSION  1. 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    Electronically signed by: Margarette Martinez (Kenneth), 10/9/2019    IRicardo II, M* personally performed the services described in this documentation, as scribed by Margarette Martinez in my presence, and it is both accurate and complete. E.     The note accurately reflects work and decisions made by me.  Ricardo Shaffer II  10/9/2019  4:31 PM

## 2019-10-09 NOTE — ED NOTES
Ace wrap applied to LLE , pt demonstrated understanding   glasses required for distance, negative ROS otherwise

## 2019-10-30 ENCOUNTER — APPOINTMENT (RX ONLY)
Dept: URBAN - METROPOLITAN AREA CLINIC 36 | Facility: CLINIC | Age: 84
Setting detail: DERMATOLOGY
End: 2019-10-30

## 2019-10-30 PROBLEM — C44.311 BASAL CELL CARCINOMA OF SKIN OF NOSE: Status: ACTIVE | Noted: 2019-10-30

## 2019-10-30 PROCEDURE — 17312 MOHS ADDL STAGE: CPT

## 2019-10-30 PROCEDURE — 14060 TIS TRNFR E/N/E/L 10 SQ CM/<: CPT

## 2019-10-30 PROCEDURE — ? MOHS SURGERY

## 2019-10-30 PROCEDURE — 17311 MOHS 1 STAGE H/N/HF/G: CPT

## 2019-10-30 NOTE — PROCEDURE: MOHS SURGERY
Manual Repair Warning Statement: We plan on removing the manually selected variable below in favor of our much easier automatic structured text blocks found in the previous tab. We decided to do this to help make the flow better and give you the full power of structured data. Manual selection is never going to be ideal in our platform and I would encourage you to avoid using manual selection from this point on, especially since I will be sunsetting this feature. It is important that you do one of two things with the customized text below. First, you can save all of the text in a word file so you can have it for future reference. Second, transfer the text to the appropriate area in the Library tab. Lastly, if there is a flap or graft type which we do not have you need to let us know right away so I can add it in before the variable is hidden. No need to panic, we plan to give you roughly 6 months to make the change.
Stage 1: Number Of Blocks?: 1
W Plasty Text: The lesion was extirpated to the level of the fat with a #15 scalpel blade.  Given the location of the defect, shape of the defect and the proximity to free margins a W-plasty was deemed most appropriate for repair.  Using a sterile surgical marker, the appropriate transposition arms of the W-plasty were drawn incorporating the defect and placing the expected incisions within the relaxed skin tension lines where possible.    The area thus outlined was incised deep to adipose tissue with a #15 scalpel blade.  The skin margins were undermined to an appropriate distance in all directions utilizing iris scissors.  The opposing transposition arms were then transposed into place in opposite direction and anchored with interrupted buried subcutaneous sutures.
Stage 9: Additional Anesthesia Type: 1% lidocaine with epinephrine
Quadrants Reporting?: 0
Crescentic Intermediate Repair Preamble Text (Leave Blank If You Do Not Want): Undermining was performed with blunt dissection.
Plastic Surgeon Procedure Text (C): After obtaining clear surgical margins the patient was sent to plastics for surgical repair.  The patient understands they will receive post-surgical care and follow-up from the referring physician's office.
Banner Transposition Flap Text: The defect edges were debeveled with a #15 scalpel blade.  Given the location of the defect and the proximity to free margins a Banner transposition flap was deemed most appropriate.  Using a sterile surgical marker, an appropriate flap drawn around the defect. The area thus outlined was incised deep to adipose tissue with a #15 scalpel blade.  The skin margins were undermined to an appropriate distance in all directions utilizing iris scissors.
Show Date Of Previous Biopsy Variable: Yes
Xenograft Text: The defect edges were debeveled with a #15 scalpel blade.  Given the location of the defect, shape of the defect and the proximity to free margins a xenograft was deemed most appropriate.  The graft was then trimmed to fit the size of the defect.  The graft was then placed in the primary defect and oriented appropriately.
Stage 2: Additional Anesthesia Type: 1% lidocaine with 1:100,000 epinephrine and 408mcg clindamycin/ml and a 1:10 solution of 8.4% sodium bicarbonate
Quadrant Reporting?: no
Flap Type: Island Pedicle Flap
Chonodrocutaneous Helical Advancement Flap Text: The defect edges were debeveled with a #15 scalpel blade.  Given the location of the defect and the proximity to free margins a chondrocutaneous helical advancement flap was deemed most appropriate.  Using a sterile surgical marker, the appropriate advancement flap was drawn incorporating the defect and placing the expected incisions within the relaxed skin tension lines where possible.    The area thus outlined was incised deep to adipose tissue with a #15 scalpel blade.  The skin margins were undermined to an appropriate distance in all directions utilizing iris scissors.
Same Histology In Subsequent Stages Text: The pattern and morphology of the tumor is as described in the first stage.
Interpolation Flap Text: A decision was made to reconstruct the defect utilizing an interpolation axial flap and a staged reconstruction.  A telfa template was made of the defect.  This telfa template was then used to outline the interpolation flap.  The donor area for the pedicle flap was then injected with anesthesia.  The flap was excised through the skin and subcutaneous tissue down to the layer of the underlying musculature.  The interpolation flap was carefully excised within this deep plane to maintain its blood supply.  The edges of the donor site were undermined.   The donor site was closed in a primary fashion.  The pedicle was then rotated into position and sutured.  Once the tube was sutured into place, adequate blood supply was confirmed with blanching and refill.  The pedicle was then wrapped with xeroform gauze and dressed appropriately with a telfa and gauze bandage to ensure continued blood supply and protect the attached pedicle.
Crescentic Complex Repair Preamble Text (Leave Blank If You Do Not Want): Extensive wide undermining was performed at least 2 cm in all directions.
Alternatives Discussed Intro (Do Not Add Period): I discussed alternative treatments to Mohs surgery and specifically discussed the risks and benefits of
Asc Procedure Text (C): After obtaining clear surgical margins the patient was sent to an ASC for surgical repair.  The patient understands they will receive post-surgical care and follow-up from the ASC physician.
Dorsal Nasal Flap Text: The defect edges were debeveled with a #15 scalpel blade.  Given the location of the defect and the proximity to free margins a dorsal nasal flap,based upon the glabellar folds, was deemed most appropriate.  Using a sterile surgical marker, an appropriate dorsal nasal flap was drawn around the defect.    The area thus outlined was incised deep to adipose tissue with a #15 scalpel blade.  The skin margins were undermined to an appropriate distance in all directions utilizing iris scissors.
Location Indication Override (Is Already Calculated Based On Selected Body Location): Area H
Partial Purse String (Intermediate) Text: Given the location of the defect and the characteristics of the surrounding skin an intermediate purse string closure was deemed most appropriate.  Undermining was performed circumfirentially around the surgical defect.  A purse string suture was then placed and tightened. Wound tension only allowed a partial closure of the circular defect.
Otolaryngologist Procedure Text (D): After obtaining clear surgical margins the patient was sent to otolaryngology for surgical repair.  The patient understands they will receive post-surgical care and follow-up from the referring physician's office.
Repair Performed By Another Provider Text (Leave Blank If You Do Not Want): After obtaining clear surgical margins the defect was repaired by another provider.
Paramedian Forehead Flap Text: A decision was made to reconstruct the defect utilizing an interpolation axial flap and a staged reconstruction.  A telfa template was made of the defect.  This telfa template was then used to outline the paramedian forehead pedicle flap.  The donor area for the pedicle flap was then injected with anesthesia.  The flap was excised through the skin and subcutaneous tissue down to the layer of the underlying musculature.  The pedicle flap was carefully excised within this deep plane to maintain its blood supply.  The edges of the donor site were undermined.   The donor site was closed in a primary fashion.  The pedicle was then rotated into position and sutured.  Once the tube was sutured into place, adequate blood supply was confirmed with blanching and refill.  The pedicle was then wrapped with xeroform gauze and dressed appropriately with a telfa and gauze bandage to ensure continued blood supply and protect the attached pedicle.
Hatchet Flap Text: The defect edges were debeveled with a #15 scalpel blade.  Given the location of the defect, shape of the defect and the proximity to free margins a hatchet flap based from the glabella was deemed most appropriate.  Using a sterile surgical marker, an appropriate glabellar hatchet flap was drawn incorporating the defect and placing the expected incisions within the relaxed skin tension lines where possible.    The area thus outlined was incised deep to adipose tissue with a #15 scalpel blade.  The skin margins were undermined to an appropriate distance in all directions utilizing iris scissors.
Wound Care (No Sutures): Petrolatum
Subsequent Stages Histo Method Verbiage: Using a similar technique to that described above, a thin layer of tissue was removed from all areas where tumor was visible on the previous stage.  The tissue was again oriented, mapped, dyed, and processed as above.
Epidermal Closure Graft Donor Site (Optional): simple interrupted
Anesthesia Volume In Cc: 6
Estimated Blood Loss (Cc): less than 5 cc
Consent (Marginal Mandibular)/Introductory Paragraph: The rationale for Mohs was explained to the patient and consent was obtained. The risks, benefits and alternatives to therapy were discussed in detail. Specifically, the risks of damage to the marginal mandibular branch of the facial nerve, infection, scarring, bleeding, prolonged wound healing, incomplete removal, allergy to anesthesia, and recurrence were addressed. Prior to the procedure, the treatment site was clearly identified and confirmed by the patient. All components of Universal Protocol/PAUSE Rule completed.
Oculoplastic Surgeon Procedure Text (B): After obtaining clear surgical margins the patient was sent to oculoplastics for surgical repair.  The patient understands they will receive post-surgical care and follow-up from the referring physician's office.
Unique Flap 1 Name: Myocutaneous Island pedicle Flap
Island Pedicle Flap-Requiring Vessel Identification Text: The defect edges were debeveled with a #15 scalpel blade.  Given the location of the defect, shape of the defect and the proximity to free margins an island pedicle advancement flap was deemed most appropriate.  Using a sterile surgical marker, an appropriate advancement flap was drawn, based on the axial vessel mentioned above, incorporating the defect, outlining the appropriate donor tissue and placing the expected incisions within the relaxed skin tension lines where possible.    The area thus outlined was incised deep to adipose tissue with a #15 scalpel blade.  The skin margins were undermined to an appropriate distance in all directions around the primary defect and laterally outward around the island pedicle utilizing iris scissors.  There was minimal undermining beneath the pedicle flap.
X Size Of Lesion In Cm (Optional): 0.4
Area L Indication Text: Tumors in this location are included in Area L (trunk and extremities).  Mohs surgery is indicated for larger tumors, or tumors with aggressive histologic features, in these anatomic locations.
Ftsg Text: The defect edges were debeveled with a #15 scalpel blade.  Given the location of the defect, shape of the defect and the proximity to free margins a full thickness skin graft was deemed most appropriate.  Using a sterile surgical marker, the primary defect shape was transferred to the donor site. The area thus outlined was incised deep to adipose tissue with a #15 scalpel blade.  The harvested graft was then trimmed of adipose tissue until only dermis and epidermis was left.  The skin margins of the secondary defect were undermined to an appropriate distance in all directions utilizing iris scissors.  The secondary defect was closed with interrupted buried subcutaneous sutures.  The skin edges were then re-apposed with running  sutures.  The skin graft was then placed in the primary defect and oriented appropriately.
Muscle Hinge Flap Text: The defect edges were debeveled with a #15 scalpel blade.  Given the size, depth and location of the defect and the proximity to free margins a muscle hinge flap was deemed most appropriate.  Using a sterile surgical marker, an appropriate hinge flap was drawn incorporating the defect. The area thus outlined was incised with a #15 scalpel blade.  The skin margins were undermined to an appropriate distance in all directions utilizing iris scissors.
Closure 4 Information: This tab is for additional flaps and grafts above and beyond our usual structured repairs.  Please note if you enter information here it will not currently bill and you will need to add the billing information manually.
Mohs Method Verbiage: An incision at a 45 degree angle following the standard Mohs approach was done and the specimen was harvested as a microscopic controlled layer.
Consent (Nose)/Introductory Paragraph: The rationale for Mohs was explained to the patient and consent was obtained. The risks, benefits and alternatives to therapy were discussed in detail. Specifically, the risks of nasal deformity, changes in the flow of air through the nose, infection, scarring, bleeding, prolonged wound healing, incomplete removal, allergy to anesthesia, nerve injury and recurrence were addressed. Prior to the procedure, the treatment site was clearly identified and confirmed by the patient. All components of Universal Protocol/PAUSE Rule completed.
Advancement-Rotation Flap Text: The defect edges were debeveled with a #15 scalpel blade.  Given the location of the defect, shape of the defect and the proximity to free margins an advancement-rotation flap was deemed most appropriate.  Using a sterile surgical marker, an appropriate flap was drawn incorporating the defect and placing the expected incisions within the relaxed skin tension lines where possible. The area thus outlined was incised deep to adipose tissue with a #15 scalpel blade.  The skin margins were undermined to an appropriate distance in all directions utilizing iris scissors.
Surgical Defect Length In Cm (Optional): 1.2
Mid-Level Procedure Text (B): After obtaining clear surgical margins the patient was sent to a mid-level provider for surgical repair.  The patient understands they will receive post-surgical care and follow-up from the mid-level provider.
Previous Accession (Optional): [id number]
Unique Flap 1 Text: A decision was made to reconstruct the defect utilizing a myocutaneous Island pedicle Flap based on the levator labii superioris muscle.  A telfa template was made of the defect.  This telfa template was then used to outline the myocutaneous flap, based along the meilolabial fold.  The donor area for the pedicle flap was then injected with anesthesia.  The flap was excised through the skin and subcutaneous tissue down to the layer of the underlying musculature.  The myocutaneous flap was carefully excised within this deep plane to maintain its blood supply. Based on the muscle. The edges of the donor site were undermined.   The donor site was closed in a primary fashion to the point of transposition.  The pedicle was then transposed into position and sutured.  Once the flap was sutured into place, adequate blood supply was confirmed with blanching and refill.
Double O-Z Plasty Text: The defect edges were debeveled with a #15 scalpel blade.  Given the location of the defect, shape of the defect and the proximity to free margins a Double O-Z plasty (double transposition flap) was deemed most appropriate.  Using a sterile surgical marker, the appropriate transposition flaps were drawn incorporating the defect and placing the expected incisions within the relaxed skin tension lines where possible. The area thus outlined was incised deep to adipose tissue with a #15 scalpel blade.  The skin margins were undermined to an appropriate distance in all directions utilizing iris scissors.  Hemostasis was achieved with electrocautery.  The flaps were then transposed into place, one clockwise and the other counterclockwise, and anchored with interrupted buried subcutaneous sutures.
Suturegard Intro: Intraoperative tissue expansion was performed, utilizing the SUTUREGARD device, in order to reduce wound tension.
Non-Graft Cartilage Fenestration Text: The cartilage was fenestrated with a 2mm punch biopsy to help facilitate healing.
Epidermal Autograft Text: The defect edges were debeveled with a #15 scalpel blade.  Given the location of the defect, shape of the defect and the proximity to free margins an epidermal autograft was deemed most appropriate.  Using a sterile surgical marker, the primary defect shape was transferred to the donor site. The epidermal graft was then harvested.  The skin graft was then placed in the primary defect and oriented appropriately.
Bi-Rhombic Flap Text: The defect edges were debeveled with a #15 scalpel blade.  Given the location of the defect and the proximity to free margins a bi-rhombic flap was deemed most appropriate.  Using a sterile surgical marker, an appropriate rhombic flap was drawn incorporating the defect. The area thus outlined was incised deep to adipose tissue with a #15 scalpel blade.  The skin margins were undermined to an appropriate distance in all directions utilizing iris scissors.
Bcc Histology Text: There were numerous aggregates of basaloid cells.
O-L Flap Text: The defect edges were debeveled with a #15 scalpel blade.  Given the location of the defect, shape of the defect and the proximity to free margins an O-L flap was deemed most appropriate.  Using a sterile surgical marker, an appropriate advancement flap was drawn incorporating the defect and placing the expected incisions within the relaxed skin tension lines where possible.    The area thus outlined was incised deep to adipose tissue with a #15 scalpel blade.  The skin margins were undermined to an appropriate distance in all directions utilizing iris scissors.
Z Plasty Text: The lesion was extirpated to the level of the fat with a #15 scalpel blade.  Given the location of the defect, shape of the defect and the proximity to free margins a Z-plasty was deemed most appropriate for repair.  Using a sterile surgical marker, the appropriate transposition arms of the Z-plasty were drawn incorporating the defect and placing the expected incisions within the relaxed skin tension lines where possible.    The area thus outlined was incised deep to adipose tissue with a #15 scalpel blade.  The skin margins were undermined to an appropriate distance in all directions utilizing iris scissors.  The opposing transposition arms were then transposed into place in opposite direction and anchored with interrupted buried subcutaneous sutures.
Anesthesia Type: 1% lidocaine with 1:100,000 epinephrine and a 1:10 solution of 8.4% sodium bicarbonate
Graft Basting Suture (Optional): 5-0 Fast Absorbing Gut
Crescentic Advancement Flap Text: The defect edges were debeveled with a #15 scalpel blade.  Given the location of the defect and the proximity to free margins a crescentic advancement flap was deemed most appropriate.  Using a sterile surgical marker, the appropriate advancement flap was drawn incorporating the defect and placing the expected incisions within the relaxed skin tension lines where possible.    The area thus outlined was incised deep to adipose tissue with a #15 scalpel blade.  The skin margins were undermined to an appropriate distance in all directions utilizing iris scissors.
Where Do You Want The Question To Include Opioid Counseling Located?: Case Summary Tab
Bilobed Flap Text: The defect edges were debeveled with a #15 scalpel blade.  Given the location of the defect and the proximity to free margins a bilobe flap was deemed most appropriate.  Using a sterile surgical marker, an appropriate bilobe flap drawn around the defect.    The area thus outlined was incised deep to adipose tissue with a #15 scalpel blade.  The skin margins were undermined to an appropriate distance in all directions utilizing iris scissors.
Post-Care Instructions: I reviewed with the patient in detail post-care instructions. Patient is not to engage in any heavy lifting, exercise, or swimming for the next 14 days. Should the patient develop any fevers, chills, bleeding, severe pain patient will contact the office immediately.
Purse String (Simple) Text: Given the location of the defect and the characteristics of the surrounding skin a purse string closure was deemed most appropriate.  Undermining was performed circumfirentially around the surgical defect.  A purse string suture was then placed and tightened.
Epidermal Sutures: 5-0 Ethilon
Consent Type: Consent 1 (Standard)
No Residual Tumor Seen Histology Text: There were no malignant cells seen in the sections examined.
Melolabial Interpolation Flap Text: A decision was made to reconstruct the defect utilizing an interpolation axial flap and a staged reconstruction.  A telfa template was made of the defect.  This telfa template was then used to outline the melolabial interpolation flap.  The donor area for the pedicle flap was then injected with anesthesia.  The flap was excised through the skin and subcutaneous tissue down to the layer of the underlying musculature.  The pedicle flap was carefully excised within this deep plane to maintain its blood supply.  The edges of the donor site were undermined.   The donor site was closed in a primary fashion.  The pedicle was then rotated into position and sutured.  Once the tube was sutured into place, adequate blood supply was confirmed with blanching and refill.  The pedicle was then wrapped with xeroform gauze and dressed appropriately with a telfa and gauze bandage to ensure continued blood supply and protect the attached pedicle.
Mercedes Flap Text: The defect edges were debeveled with a #15 scalpel blade.  Given the location of the defect, shape of the defect and the proximity to free margins a Mercedes flap was deemed most appropriate.  Using a sterile surgical marker, an appropriate advancement flap was drawn incorporating the defect and placing the expected incisions within the relaxed skin tension lines where possible. The area thus outlined was incised deep to adipose tissue with a #15 scalpel blade.  The skin margins were undermined to an appropriate distance in all directions utilizing iris scissors.
Dressing: dry sterile dressing
Repair Anesthesia Method: local infiltration
Consent 1/Introductory Paragraph: The rationale for Mohs was explained to the patient and consent was obtained. The risks, benefits and alternatives to therapy were discussed in detail. Specifically, the risks of infection, scarring, bleeding, prolonged wound healing, incomplete removal, allergy to anesthesia, nerve injury and recurrence were addressed. Prior to the procedure, the treatment site was clearly identified and confirmed by the patient. All components of Universal Protocol/PAUSE Rule completed.
Advancement Flap (Single) Text: The defect edges were debeveled with a #15 scalpel blade.  Given the location of the defect and the proximity to free margins a single advancement flap was deemed most appropriate.  Using a sterile surgical marker, an appropriate advancement flap was drawn incorporating the defect and placing the expected incisions within the relaxed skin tension lines where possible.    The area thus outlined was incised deep to adipose tissue with a #15 scalpel blade.  The skin margins were undermined to an appropriate distance in all directions utilizing iris scissors.
Localized Dermabrasion With Wire Brush Text: The patient was draped in routine manner.  Localized dermabrasion using 3 x 17 mm wire brush was performed in routine manner to papillary dermis. This spot dermabrasion is being performed to complete skin cancer reconstruction. It also will eliminate the other sun damaged precancerous cells that are known to be part of the regional effect of a lifetime's worth of sun exposure. This localized dermabrasion is therapeutic and should not be considered cosmetic in any regard.
Island Pedicle Flap Text: The defect edges were debeveled with a #15 scalpel blade.  Given the location of the defect, shape of the defect and the proximity to free margins an island pedicle advancement flap was deemed most appropriate.  Using a sterile surgical marker, an appropriate advancement flap was drawn incorporating the defect, outlining the appropriate donor tissue and placing the expected incisions within the relaxed skin tension lines where possible.    The area thus outlined was incised deep to adipose tissue with a #15 scalpel blade.  The skin margins were undermined to an appropriate distance in all directions around the primary defect and laterally outward around the island pedicle utilizing iris scissors.  There was minimal undermining beneath the pedicle flap.
Information: Selecting Yes will display possible errors in your note based on the variables you have selected. This validation is only offered as a suggestion for you. PLEASE NOTE THAT THE VALIDATION TEXT WILL BE REMOVED WHEN YOU FINALIZE YOUR NOTE. IF YOU WANT TO FAX A PRELIMINARY NOTE YOU WILL NEED TO TOGGLE THIS TO 'NO' IF YOU DO NOT WANT IT IN YOUR FAXED NOTE.
Mohs Rapid Report Verbiage: The area of clinically evident tumor was marked with skin marking ink and appropriately hatched.  The initial incision was made following the Mohs approach through the skin.  The specimen was taken to the lab, divided into the necessary number of pieces, chromacoded and processed according to the Mohs protocol.  This was repeated in successive stages until a tumor free defect was achieved.
Cheiloplasty (Less Than 50%) Text: A decision was made to reconstruct the defect with a  cheiloplasty.  The defect was undermined extensively.  Additional obicularis oris muscle was excised with a 15 blade scalpel.  The defect was converted into a full thickness wedge, of less than 50% of the vertical height of the lip, to facilite a better cosmetic result.  Small vessels were then tied off with 5-0 monocyrl. The obicularis oris, superficial fascia, adipose and dermis were then reapproximated.  After the deeper layers were approximated the epidermis was reapproximated with particular care given to realign the vermilion border.
Rotation Flap Text: The defect edges were debeveled with a #15 scalpel blade.  Given the location of the defect, shape of the defect and the proximity to free margins a rotation flap was deemed most appropriate.  Using a sterile surgical marker, an appropriate rotation flap was drawn incorporating the defect and placing the expected incisions within the relaxed skin tension lines where possible.    The area thus outlined was incised deep to adipose tissue with a #15 scalpel blade.  The skin margins were undermined to an appropriate distance in all directions utilizing iris scissors.
Cheiloplasty (Complex) Text: A decision was made to reconstruct the defect with a  cheiloplasty.  The defect was undermined extensively.  Additional obicularis oris muscle was excised with a 15 blade scalpel.  The defect was converted into a full thickness wedge to facilite a better cosmetic result.  Small vessels were then tied off with 5-0 monocyrl. The obicularis oris, superficial fascia, adipose and dermis were then reapproximated.  After the deeper layers were approximated the epidermis was reapproximated with particular care given to realign the vermilion border.
Spiral Flap Text: The defect edges were debeveled with a #15 scalpel blade.  Given the location of the defect, shape of the defect and the proximity to free margins a spiral flap was deemed most appropriate.  Using a sterile surgical marker, an appropriate rotation flap was drawn incorporating the defect and placing the expected incisions within the relaxed skin tension lines where possible. The area thus outlined was incised deep to adipose tissue with a #15 scalpel blade.  The skin margins were undermined to an appropriate distance in all directions utilizing iris scissors.
Consent (Spinal Accessory)/Introductory Paragraph: The rationale for Mohs was explained to the patient and consent was obtained. The risks, benefits and alternatives to therapy were discussed in detail. Specifically, the risks of damage to the spinal accessory nerve, infection, scarring, bleeding, prolonged wound healing, incomplete removal, allergy to anesthesia, and recurrence were addressed. Prior to the procedure, the treatment site was clearly identified and confirmed by the patient. All components of Universal Protocol/PAUSE Rule completed.
Suturegard Retention Suture: 2-0 Nylon
Unique Flap 2 Name: Peng Flap
Keystone Flap Text: The defect edges were debeveled with a #15 scalpel blade.  Given the location of the defect, shape of the defect a keystone flap was deemed most appropriate.  Using a sterile surgical marker, an appropriate keystone flap was drawn incorporating the defect, outlining the appropriate donor tissue and placing the expected incisions within the relaxed skin tension lines where possible. The area thus outlined was incised deep to adipose tissue with a #15 scalpel blade.  The skin margins were undermined to an appropriate distance in all directions around the primary defect and laterally outward around the flap utilizing iris scissors.
Surgeon/Pathologist Verbiage (Will Incorporate Name Of Surgeon From Intro If Not Blank): operated in two distinct and integrated capacities as the surgeon and pathologist.
Number Of Stages: 3
Split-Thickness Skin Graft Text: The defect edges were debeveled with a #15 scalpel blade.  Given the location of the defect, shape of the defect and the proximity to free margins a split thickness skin graft was deemed most appropriate.  Using a sterile surgical marker, the primary defect shape was transferred to the donor site. The split thickness graft was then harvested.  The skin graft was then placed in the primary defect and oriented appropriately.
Melolabial Transposition Flap Text: The defect edges were debeveled with a #15 scalpel blade.  Given the location of the defect and the proximity to free margins a melolabial flap was deemed most appropriate.  Using a sterile surgical marker, an appropriate melolabial transposition flap was drawn incorporating the defect.    The area thus outlined was incised deep to adipose tissue with a #15 scalpel blade.  The skin margins were undermined to an appropriate distance in all directions utilizing iris scissors.
Closure 2 Information: This tab is for additional flaps and grafts, including complex repair and grafts and complex repair and flaps. You can also specify a different location for the additional defect, if the location is the same you do not need to select a new one. We will insert the automated text for the repair you select below just as we do for solitary flaps and grafts. Please note that at this time if you select a location with a different insurance zone you will need to override the ICD10 and CPT if appropriate.
Consent (Lip)/Introductory Paragraph: The rationale for Mohs was explained to the patient and consent was obtained. The risks, benefits and alternatives to therapy were discussed in detail. Specifically, the risks of lip deformity, changes in the oral aperture, infection, scarring, bleeding, prolonged wound healing, incomplete removal, allergy to anesthesia, nerve injury and recurrence were addressed. Prior to the procedure, the treatment site was clearly identified and confirmed by the patient. All components of Universal Protocol/PAUSE Rule completed.
Length To Time In Minutes Device Was In Place: 10
Graft Cartilage Fenestration Text: The cartilage was fenestrated with a 2mm punch biopsy to help facilitate graft survival and healing.
Unique Flap 2 Text: A decision was made to reconstruct the defect utilizing a Peng Flap (Bilateral Advancement Rotation Flap). Given the location of the defect and the proximity to free margins, this flap was deemed most appropriate.  Using a sterile surgical marker, the appropriate rotation flaps were drawn incorporating the defect and placing the expected incisions within the relaxed skin tension lines where possible.    The area thus outlined was incised deep to adipose tissue with a #15 scalpel blade.  The skin margins were undermined to an appropriate distance in all directions utilizing iris scissors.
S Plasty Text: Given the location and shape of the defect, and the orientation of relaxed skin tension lines, an S-plasty was deemed most appropriate for repair.  Using a sterile surgical marker, the appropriate outline of the S-plasty was drawn, incorporating the defect and placing the expected incisions within the relaxed skin tension lines where possible.  The area thus outlined was incised deep to adipose tissue with a #15 scalpel blade.  The skin margins were undermined to an appropriate distance in all directions utilizing iris scissors. The skin flaps were advanced over the defect.  The opposing margins were then approximated with interrupted buried subcutaneous sutures.
Suturegard Body: The suture ends were repeatedly re-tightened and re-clamped to achieve the desired tissue expansion.
O-Z Flap Text: The defect edges were debeveled with a #15 scalpel blade.  Given the location of the defect, shape of the defect and the proximity to free margins an O-Z flap was deemed most appropriate.  Using a sterile surgical marker, an appropriate transposition flap was drawn incorporating the defect and placing the expected incisions within the relaxed skin tension lines where possible. The area thus outlined was incised deep to adipose tissue with a #15 scalpel blade.  The skin margins were undermined to an appropriate distance in all directions utilizing iris scissors.
Dermal Autograft Text: The defect edges were debeveled with a #15 scalpel blade.  Given the location of the defect, shape of the defect and the proximity to free margins a dermal autograft was deemed most appropriate.  Using a sterile surgical marker, the primary defect shape was transferred to the donor site. The area thus outlined was incised deep to adipose tissue with a #15 scalpel blade.  The harvested graft was then trimmed of adipose and epidermal tissue until only dermis was left.  The skin graft was then placed in the primary defect and oriented appropriately.
Helical Rim Advancement Flap Text: The defect edges were debeveled with a #15 blade scalpel.  Given the location of the defect and the proximity to free margins (helical rim) a double helical rim advancement flap was deemed most appropriate.  Using a sterile surgical marker, the appropriate advancement flaps were drawn incorporating the defect and placing the expected incisions between the helical rim and antihelix where possible.  The area thus outlined was incised through and through with a #15 scalpel blade.  With a skin hook and iris scissors, the flaps were gently and sharply undermined and freed up.
Bcc Infiltrative Histology Text: There were numerous aggregates of basaloid cells demonstrating an infiltrative pattern.
Unna Boot Text: An Unna boot was placed to help immobilize the limb and facilitate more rapid healing.
Skin Substitute Text: The defect edges were debeveled with a #15 scalpel blade.  Given the location of the defect, shape of the defect and the proximity to free margins a skin substitute graft was deemed most appropriate.  The graft material was trimmed to fit the size of the defect. The graft was then placed in the primary defect and oriented appropriately.
Bilateral Helical Rim Advancement Flap Text: The defect edges were debeveled with a #15 blade scalpel.  Given the location of the defect and the proximity to free margins (helical rim) a bilateral helical rim advancement flap was deemed most appropriate.  Using a sterile surgical marker, the appropriate advancement flaps were drawn incorporating the defect and placing the expected incisions between the helical rim and antihelix where possible.  The area thus outlined was incised through and through with a #15 scalpel blade.  With a skin hook and iris scissors, the flaps were gently and sharply undermined and freed up.
Repair Type: Flap
Home Suture Removal Text: Patient was provided instructions on removing sutures and will remove their sutures at home.  If they have any questions or difficulties they will call the office.
A-T Advancement Flap Text: The defect edges were debeveled with a #15 scalpel blade.  Given the location of the defect, shape of the defect and the proximity to free margins an A-T advancement flap was deemed most appropriate.  Using a sterile surgical marker, an appropriate advancement flap was drawn incorporating the defect and placing the expected incisions within the relaxed skin tension lines where possible.    The area thus outlined was incised deep to adipose tissue with a #15 scalpel blade.  The skin margins were undermined to an appropriate distance in all directions utilizing iris scissors.
Cheek Interpolation Flap Text: A decision was made to reconstruct the defect utilizing an interpolation axial flap and a staged reconstruction.  A telfa template was made of the defect.  This telfa template was then used to outline the Cheek Interpolation flap.  The donor area for the pedicle flap was then injected with anesthesia.  The flap was excised through the skin and subcutaneous tissue down to the layer of the underlying musculature.  The interpolation flap was carefully excised within this deep plane to maintain its blood supply.  The edges of the donor site were undermined.   The donor site was closed in a primary fashion.  The pedicle was then rotated into position and sutured.  Once the tube was sutured into place, adequate blood supply was confirmed with blanching and refill.  The pedicle was then wrapped with xeroform gauze and dressed appropriately with a telfa and gauze bandage to ensure continued blood supply and protect the attached pedicle.
M-Plasty Complex Repair Preamble Text (Leave Blank If You Do Not Want): Extensive wide undermining was performed.
Bilobed Transposition Flap Text: The defect edges were debeveled with a #15 scalpel blade.  Given the location of the defect and the proximity to free margins a bilobed transposition flap was deemed most appropriate.  Using a sterile surgical marker, an appropriate bilobe flap drawn around the defect.    The area thus outlined was incised deep to adipose tissue with a #15 scalpel blade.  The skin margins were undermined to an appropriate distance in all directions utilizing iris scissors.
Oculoplastic Surgeon (A): Minh
Purse String (Intermediate) Text: Given the location of the defect and the characteristics of the surrounding skin a purse string intermediate closure was deemed most appropriate.  Undermining was performed circumfirentially around the surgical defect.  A purse string suture was then placed and tightened.
Postop Diagnosis: same
Secondary Defect Length In Cm (Required For Flaps): 3.3
Advancement Flap (Double) Text: The defect edges were debeveled with a #15 scalpel blade.  Given the location of the defect and the proximity to free margins a double advancement flap was deemed most appropriate.  Using a sterile surgical marker, the appropriate advancement flaps were drawn incorporating the defect and placing the expected incisions within the relaxed skin tension lines where possible.    The area thus outlined was incised deep to adipose tissue with a #15 scalpel blade.  The skin margins were undermined to an appropriate distance in all directions utilizing iris scissors.
Mastoid Interpolation Flap Text: A decision was made to reconstruct the defect utilizing an interpolation axial flap and a staged reconstruction.  A telfa template was made of the defect.  This telfa template was then used to outline the mastoid interpolation flap.  The donor area for the pedicle flap was then injected with anesthesia.  The flap was excised through the skin and subcutaneous tissue down to the layer of the underlying musculature.  The pedicle flap was carefully excised within this deep plane to maintain its blood supply.  The edges of the donor site were undermined.   The donor site was closed in a primary fashion.  The pedicle was then rotated into position and sutured.  Once the tube was sutured into place, adequate blood supply was confirmed with blanching and refill.  The pedicle was then wrapped with xeroform gauze and dressed appropriately with a telfa and gauze bandage to ensure continued blood supply and protect the attached pedicle.
Modified Advancement Flap Text: The defect edges were debeveled with a #15 scalpel blade.  Given the location of the defect, shape of the defect and the proximity to free margins a modified advancement flap was deemed most appropriate.  Using a sterile surgical marker, an appropriate advancement flap was drawn incorporating the defect and placing the expected incisions within the relaxed skin tension lines where possible.    The area thus outlined was incised deep to adipose tissue with a #15 scalpel blade.  The skin margins were undermined to an appropriate distance in all directions utilizing iris scissors.
Consent 2/Introductory Paragraph: Mohs surgery was explained to the patient and consent was obtained. The risks, benefits and alternatives to therapy were discussed in detail. Specifically, the risks of infection, scarring, bleeding, prolonged wound healing, incomplete removal, allergy to anesthesia, nerve injury and recurrence were addressed. Prior to the procedure, the treatment site was clearly identified and confirmed by the patient. All components of Universal Protocol/PAUSE Rule completed.
Tarsorrhaphy Text: A tarsorrhaphy was performed using Frost sutures.
Island Pedicle Flap With Canthal Suspension Text: The defect edges were debeveled with a #15 scalpel blade.  Given the location of the defect, shape of the defect and the proximity to free margins an island pedicle advancement flap was deemed most appropriate.  Using a sterile surgical marker, an appropriate advancement flap was drawn incorporating the defect, outlining the appropriate donor tissue and placing the expected incisions within the relaxed skin tension lines where possible. The area thus outlined was incised deep to adipose tissue with a #15 scalpel blade.  The skin margins were undermined to an appropriate distance in all directions around the primary defect and laterally outward around the island pedicle utilizing iris scissors.  There was minimal undermining beneath the pedicle flap. A suspension suture was placed in the canthal tendon to prevent tension and prevent ectropion.
Alar Island Pedicle Flap Text: The defect edges were debeveled with a #15 scalpel blade.  Given the location of the defect, shape of the defect and the proximity to the alar rim an island pedicle advancement flap was deemed most appropriate.  Using a sterile surgical marker, an appropriate advancement flap was drawn incorporating the defect, outlining the appropriate donor tissue and placing the expected incisions within the nasal ala running parallel to the alar rim. The area thus outlined was incised with a #15 scalpel blade.  The skin margins were undermined minimally to an appropriate distance in all directions around the primary defect and laterally outward around the island pedicle utilizing iris scissors.  There was minimal undermining beneath the pedicle flap.
Epidermal Closure: running cuticular
Surgeon Performing Repair (Optional): Melly
Inflammation Suggestive Of Cancer Camouflage Histology Text: There was a dense lymphocytic infiltrate which prevented adequate histologic evaluation of adjacent structures.
Ear Wedge Repair Text: A wedge excision was completed by carrying down an excision through the full thickness of the ear and cartilage with an inward facing Burow's triangle. The wound was then closed in a layered fashion.
Star Wedge Flap Text: The defect edges were debeveled with a #15 scalpel blade.  Given the location of the defect, shape of the defect and the proximity to free margins a star wedge flap was deemed most appropriate.  Using a sterile surgical marker, an appropriate rotation flap was drawn incorporating the defect and placing the expected incisions within the relaxed skin tension lines where possible. The area thus outlined was incised deep to adipose tissue with a #15 scalpel blade.  The skin margins were undermined to an appropriate distance in all directions utilizing iris scissors.
Graft Donor Site Bandage (Optional-Leave Blank If You Don't Want In Note): Aquaphor and telefa placed on wound. Pressure dressing applied to donor site
Mohs Histo Method Verbiage: Each section was then chromacoded and processed in the Mohs lab using the Mohs protocol and submitted for frozen section.
Consent (Near Eyelid Margin)/Introductory Paragraph: The rationale for Mohs was explained to the patient and consent was obtained. The risks, benefits and alternatives to therapy were discussed in detail. Specifically, the risks of ectropion or eyelid deformity, infection, scarring, bleeding, prolonged wound healing, incomplete removal, allergy to anesthesia, nerve injury and recurrence were addressed. Prior to the procedure, the treatment site was clearly identified and confirmed by the patient. All components of Universal Protocol/PAUSE Rule completed.
Unique Flap 3 Name: Mercedes Flap
Mohs Case Number: 
O-T Plasty Text: The defect edges were debeveled with a #15 scalpel blade.  Given the location of the defect, shape of the defect and the proximity to free margins an O-T plasty was deemed most appropriate.  Using a sterile surgical marker, an appropriate O-T plasty was drawn incorporating the defect and placing the expected incisions within the relaxed skin tension lines where possible.    The area thus outlined was incised deep to adipose tissue with a #15 scalpel blade.  The skin margins were undermined to an appropriate distance in all directions utilizing iris scissors.
Pain Refusal Text: I offered to prescribe pain medication but the patient refused to take this medication.
Secondary Intention Text (Leave Blank If You Do Not Want): The defect will heal with secondary intention.
Cartilage Graft Text: The defect edges were debeveled with a #15 scalpel blade.  Given the location of the defect, shape of the defect, the fact the defect involved a full thickness cartilage defect a cartilage graft was deemed most appropriate.  An appropriate donor site was identified, cleansed, and anesthetized. The cartilage graft was then harvested and transferred to the recipient site, oriented appropriately and then sutured into place.  The secondary defect was then repaired using a primary closure.
Rhombic Flap Text: The defect edges were debeveled with a #15 scalpel blade.  Given the location of the defect and the proximity to free margins a rhombic flap was deemed most appropriate.  Using a sterile surgical marker, an appropriate rhombic flap was drawn incorporating the defect.    The area thus outlined was incised deep to adipose tissue with a #15 scalpel blade.  The skin margins were undermined to an appropriate distance in all directions utilizing iris scissors.
Area H Indication Text: Tumors in this location are included in Area H (eyelids, eyebrows, nose, lips, chin, ear, pre-auricular, post-auricular, temple, genitalia, hands, feet, ankles and areola).  Tissue conservation is critical in these anatomic locations.
Consent (Scalp)/Introductory Paragraph: The rationale for Mohs was explained to the patient and consent was obtained. The risks, benefits and alternatives to therapy were discussed in detail. Specifically, the risks of changes in hair growth pattern secondary to repair, infection, scarring, bleeding, prolonged wound healing, incomplete removal, allergy to anesthesia, nerve injury and recurrence were addressed. Prior to the procedure, the treatment site was clearly identified and confirmed by the patient. All components of Universal Protocol/PAUSE Rule completed.
Double O-Z Flap Text: The defect edges were debeveled with a #15 scalpel blade.  Given the location of the defect, shape of the defect and the proximity to free margins a Double O-Z flap was deemed most appropriate.  Using a sterile surgical marker, an appropriate transposition flap was drawn incorporating the defect and placing the expected incisions within the relaxed skin tension lines where possible. The area thus outlined was incised deep to adipose tissue with a #15 scalpel blade.  The skin margins were undermined to an appropriate distance in all directions utilizing iris scissors.
Unique Flap 3 Text: The defect edges were debeveled with a #15 scalpel blade.  Given the location of the defect, shape of the defect and the proximity to free margins a Mercedes (double advancement flap) was deemed most appropriate.  Using a sterile surgical marker, the appropriate transposition flaps were drawn incorporating the defect and placing the expected incisions within the relaxed skin tension lines where possible.    The area thus outlined was incised deep to adipose tissue with a #15 scalpel blade.  The skin margins were undermined to an appropriate distance in all directions utilizing iris scissors.  Hemostasis was achieved with electrocautery.  The flaps were then advanced into the defect and anchored with interrupted buried subcutaneous sutures.
Surgical Defect Length In Cm (Optional): 0.6
V-Y Plasty Text: The defect edges were debeveled with a #15 scalpel blade.  Given the location of the defect, shape of the defect and the proximity to free margins an V-Y advancement flap was deemed most appropriate.  Using a sterile surgical marker, an appropriate advancement flap was drawn incorporating the defect and placing the expected incisions within the relaxed skin tension lines where possible.    The area thus outlined was incised deep to adipose tissue with a #15 scalpel blade.  The skin margins were undermined to an appropriate distance in all directions utilizing iris scissors.
Donor Site Anesthesia Type: same as repair anesthesia
O-T Advancement Flap Text: The defect edges were debeveled with a #15 scalpel blade.  Given the location of the defect, shape of the defect and the proximity to free margins an O-T advancement flap was deemed most appropriate.  Using a sterile surgical marker, an appropriate advancement flap was drawn incorporating the defect and placing the expected incisions within the relaxed skin tension lines where possible.    The area thus outlined was incised deep to adipose tissue with a #15 scalpel blade.  The skin margins were undermined to an appropriate distance in all directions utilizing iris scissors.
Tissue Cultured Epidermal Autograft Text: The defect edges were debeveled with a #15 scalpel blade.  Given the location of the defect, shape of the defect and the proximity to free margins a tissue cultured epidermal autograft was deemed most appropriate.  The graft was then trimmed to fit the size of the defect.  The graft was then placed in the primary defect and oriented appropriately.
Ear Star Wedge Flap Text: The defect edges were debeveled with a #15 blade scalpel.  Given the location of the defect and the proximity to free margins (helical rim) an ear star wedge flap was deemed most appropriate.  Using a sterile surgical marker, the appropriate flap was drawn incorporating the defect and placing the expected incisions between the helical rim and antihelix where possible.  The area thus outlined was incised through and through with a #15 scalpel blade.
Cheek-To-Nose Interpolation Flap Text: A decision was made to reconstruct the defect utilizing an interpolation axial flap and a staged reconstruction.  A telfa template was made of the defect.  This telfa template was then used to outline the Cheek-To-Nose Interpolation flap.  The donor area for the pedicle flap was then injected with anesthesia.  The flap was excised through the skin and subcutaneous tissue down to the layer of the underlying musculature.  The interpolation flap was carefully excised within this deep plane to maintain its blood supply.  The edges of the donor site were undermined.   The donor site was closed in a primary fashion.  The pedicle was then rotated into position and sutured.  Once the tube was sutured into place, adequate blood supply was confirmed with blanching and refill.  The pedicle was then wrapped with xeroform gauze and dressed appropriately with a telfa and gauze bandage to ensure continued blood supply and protect the attached pedicle.
Wound Care: Aquaphor
Medical Necessity Statement: Based on my medical judgement, Mohs surgery is the most appropriate treatment for this cancer compared to other treatments.
Burow's Advancement Flap Text: The defect edges were debeveled with a #15 scalpel blade.  Given the location of the defect and the proximity to free margins a Burow's advancement flap was deemed most appropriate.  Using a sterile surgical marker, the appropriate advancement flap was drawn incorporating the defect and placing the expected incisions within the relaxed skin tension lines where possible.    The area thus outlined was incised deep to adipose tissue with a #15 scalpel blade.  The skin margins were undermined to an appropriate distance in all directions utilizing iris scissors.
Trilobed Flap Text: The defect edges were debeveled with a #15 scalpel blade.  Given the location of the defect and the proximity to free margins a trilobed flap was deemed most appropriate.  Using a sterile surgical marker, an appropriate trilobed flap drawn around the defect.    The area thus outlined was incised deep to adipose tissue with a #15 scalpel blade.  The skin margins were undermined to an appropriate distance in all directions utilizing iris scissors.
Partial Purse String (Simple) Text: Given the location of the defect and the characteristics of the surrounding skin a simple purse string closure was deemed most appropriate.  Undermining was performed circumfirentially around the surgical defect.  A purse string suture was then placed and tightened. Wound tension only allowed a partial closure of the circular defect.
Secondary Defect Width In Cm (Required For Flaps): 1.5
Posterior Auricular Interpolation Flap Text: A decision was made to reconstruct the defect utilizing an interpolation axial flap and a staged reconstruction.  A telfa template was made of the defect.  This telfa template was then used to outline the posterior auricular interpolation flap.  The donor area for the pedicle flap was then injected with anesthesia.  The flap was excised through the skin and subcutaneous tissue down to the layer of the underlying musculature.  The pedicle flap was carefully excised within this deep plane to maintain its blood supply.  The edges of the donor site were undermined.   The donor site was closed in a primary fashion.  The pedicle was then rotated into position and sutured.  Once the tube was sutured into place, adequate blood supply was confirmed with blanching and refill.  The pedicle was then wrapped with xeroform gauze and dressed appropriately with a telfa and gauze bandage to ensure continued blood supply and protect the attached pedicle.
Mucosal Advancement Flap Text: Given the location of the defect, shape of the defect and the proximity to free margins a mucosal advancement flap was deemed most appropriate. Incisions were made with a 15 blade scalpel in the appropriate fashion along the cutaneous vermilion border and the mucosal lip. The remaining actinically damaged mucosal tissue was excised.  The mucosal advancement flap was then elevated to the gingival sulcus with care taken to preserve the neurovascular structures and advanced into the primary defect. Care was taken to ensure that precise realignment of the vermilion border was achieved.
Graft Donor Site Epidermal Sutures (Optional): 5-0 Ethibond
Hemostasis: Electrocautery
Consent 3/Introductory Paragraph: I gave the patient a chance to ask questions they had about the procedure.  Following this I explained the Mohs procedure and consent was obtained. The risks, benefits and alternatives to therapy were discussed in detail. Specifically, the risks of infection, scarring, bleeding, prolonged wound healing, incomplete removal, allergy to anesthesia, nerve injury and recurrence were addressed. Prior to the procedure, the treatment site was clearly identified and confirmed by the patient. All components of Universal Protocol/PAUSE Rule completed.
Complex Repair And Flap Additional Text (Will Appearing After The Standard Complex Repair Text): The complex repair was not sufficient to completely close the primary defect. The remaining additional defect was repaired with the flap mentioned below.
Consent (Temporal Branch)/Introductory Paragraph: The rationale for Mohs was explained to the patient and consent was obtained. The risks, benefits and alternatives to therapy were discussed in detail. Specifically, the risks of damage to the temporal branch of the facial nerve, infection, scarring, bleeding, prolonged wound healing, incomplete removal, allergy to anesthesia, and recurrence were addressed. Prior to the procedure, the treatment site was clearly identified and confirmed by the patient. All components of Universal Protocol/PAUSE Rule completed.
Complex Repair And Graft Additional Text (Will Appearing After The Standard Complex Repair Text): The complex repair was not sufficient to completely close the primary defect. The remaining additional defect was repaired with the graft mentioned below.
Double Island Pedicle Flap Text: The defect edges were debeveled with a #15 scalpel blade.  Given the location of the defect, shape of the defect and the proximity to free margins a double island pedicle advancement flap was deemed most appropriate.  Using a sterile surgical marker, an appropriate advancement flap was drawn incorporating the defect, outlining the appropriate donor tissue and placing the expected incisions within the relaxed skin tension lines where possible.    The area thus outlined was incised deep to adipose tissue with a #15 scalpel blade.  The skin margins were undermined to an appropriate distance in all directions around the primary defect and laterally outward around the island pedicle utilizing iris scissors.  There was minimal undermining beneath the pedicle flap.
Full Thickness Lip Wedge Repair (Flap) Text: Given the location of the defect and the proximity to free margins a full thickness wedge repair was deemed most appropriate.  Using a sterile surgical marker, the appropriate repair was drawn incorporating the defect and placing the expected incisions perpendicular to the vermilion border.  The vermilion border was also meticulously outlined to ensure appropriate reapproximation during the repair.  The area thus outlined was incised through and through with a #15 scalpel blade.  The muscularis and dermis were reaproximated with deep sutures following hemostasis. Care was taken to realign the vermilion border before proceeding with the superficial closure.  Once the vermilion was realigned the superfical and mucosal closure was finished.
Transposition Flap Text: The defect edges were debeveled with a #15 scalpel blade.  Given the location of the defect and the proximity to free margins a transposition flap was deemed most appropriate.  Using a sterile surgical marker, an appropriate transposition flap was drawn incorporating the defect.    The area thus outlined was incised deep to adipose tissue with a #15 scalpel blade.  The skin margins were undermined to an appropriate distance in all directions utilizing iris scissors.
Suture Removal: 7 days
Consent (Ear)/Introductory Paragraph: The rationale for Mohs was explained to the patient and consent was obtained. The risks, benefits and alternatives to therapy were discussed in detail. Specifically, the risks of ear deformity, infection, scarring, bleeding, prolonged wound healing, incomplete removal, allergy to anesthesia, nerve injury and recurrence were addressed. Prior to the procedure, the treatment site was clearly identified and confirmed by the patient. All components of Universal Protocol/PAUSE Rule completed.
No Repair - Repaired With Adjacent Surgical Defect Text (Leave Blank If You Do Not Want): After obtaining clear surgical margins the defect was repaired concurrently with another surgical defect which was in close approximation.
Retention Suture Bite Size: 3 mm
Unique Flap 4 Name: Banner Flap
O-Z Plasty Text: The defect edges were debeveled with a #15 scalpel blade.  Given the location of the defect, shape of the defect and the proximity to free margins an O-Z plasty (double transposition flap) was deemed most appropriate.  Using a sterile surgical marker, the appropriate transposition flaps were drawn incorporating the defect and placing the expected incisions within the relaxed skin tension lines where possible.    The area thus outlined was incised deep to adipose tissue with a #15 scalpel blade.  The skin margins were undermined to an appropriate distance in all directions utilizing iris scissors.  Hemostasis was achieved with electrocautery.  The flaps were then transposed into place, one clockwise and the other counterclockwise, and anchored with interrupted buried subcutaneous sutures.
Area M Indication Text: Tumors in this location are included in Area M (cheek, forehead, scalp, neck, jawline and pretibial skin).  Mohs surgery is indicated for tumors in these anatomic locations.
Mauc Instructions: By selecting yes to the question below the MAUC number will be added into the note.  This will be calculated automatically based on the diagnosis chosen, the size entered, the body zone selected (H,M,L) and the specific indications you chose. You will also have the option to override the Mohs AUC if you disagree with the automatically calculated number and this option is found in the Case Summary tab.
Eye Protection Verbiage: Before proceeding with the stage, a plastic scleral shield was inserted. The globe was anesthetized with a few drops of 1% lidocaine with 1:100,000 epinephrine. Then, an appropriate sized scleral shield was chosen and coated with lacrilube ointment. The shield was gently inserted and left in place for the duration of each stage. After the stage was completed, the shield was gently removed.
V-Y Flap Text: The defect edges were debeveled with a #15 scalpel blade.  Given the location of the defect, shape of the defect and the proximity to free margins a V-Y flap was deemed most appropriate.  Using a sterile surgical marker, an appropriate advancement flap was drawn incorporating the defect and placing the expected incisions within the relaxed skin tension lines where possible.    The area thus outlined was incised deep to adipose tissue with a #15 scalpel blade.  The skin margins were undermined to an appropriate distance in all directions utilizing iris scissors.
Composite Graft Text: The defect edges were debeveled with a #15 scalpel blade.  Given the location of the defect, shape of the defect, the proximity to free margins and the fact the defect was full thickness a composite graft was deemed most appropriate.  The defect was outline and then transferred to the donor site.  A full thickness graft was then excised from the donor site. The graft was then placed in the primary defect, oriented appropriately and then sutured into place.  The secondary defect was then repaired using a primary closure.
Rhomboid Transposition Flap Text: The defect edges were debeveled with a #15 scalpel blade.  Given the location of the defect and the proximity to free margins a rhomboid transposition flap was deemed most appropriate.  Using a sterile surgical marker, an appropriate rhomboid flap was drawn incorporating the defect.    The area thus outlined was incised deep to adipose tissue with a #15 scalpel blade.  The skin margins were undermined to an appropriate distance in all directions utilizing iris scissors.
Deep Sutures: 5-0 Vicryl
Detail Level: Detailed
Referring Physician (Optional): A. Schoening
Unique Flap 4 Text: The defect edges were debeveled with a #15 scalpel blade.  Given the location of the defect and the proximity to free margins a Banner transposition flap was deemed most appropriate.  Using a sterile surgical marker, an appropriate Banner transposition flap was drawn incorporating the defect.    The area thus outlined was incised deep to adipose tissue with a #15 scalpel blade.  The skin margins were undermined to an appropriate distance in all directions utilizing iris scissors.
H Plasty Text: Given the location of the defect, shape of the defect and the proximity to free margins a H-plasty was deemed most appropriate for repair.  Using a sterile surgical marker, the appropriate advancement arms of the H-plasty were drawn incorporating the defect and placing the expected incisions within the relaxed skin tension lines where possible. The area thus outlined was incised deep to adipose tissue with a #15 scalpel blade. The skin margins were undermined to an appropriate distance in all directions utilizing iris scissors.  The opposing advancement arms were then advanced into place in opposite direction and anchored with interrupted buried subcutaneous sutures.

## 2019-10-31 RX ORDER — ATORVASTATIN CALCIUM 10 MG/1
10 TABLET, FILM COATED ORAL EVERY EVENING
Qty: 90 TAB | Refills: 0 | Status: SHIPPED | OUTPATIENT
Start: 2019-10-31 | End: 2020-02-14 | Stop reason: SDUPTHER

## 2019-11-06 ENCOUNTER — APPOINTMENT (RX ONLY)
Dept: URBAN - METROPOLITAN AREA CLINIC 36 | Facility: CLINIC | Age: 84
Setting detail: DERMATOLOGY
End: 2019-11-06

## 2019-11-06 DIAGNOSIS — Z48.02 ENCOUNTER FOR REMOVAL OF SUTURES: ICD-10-CM

## 2019-11-06 PROCEDURE — 99024 POSTOP FOLLOW-UP VISIT: CPT

## 2019-11-06 PROCEDURE — ? SUTURE REMOVAL (GLOBAL PERIOD)

## 2019-11-06 ASSESSMENT — LOCATION SIMPLE DESCRIPTION DERM: LOCATION SIMPLE: NOSE

## 2019-11-06 ASSESSMENT — LOCATION DETAILED DESCRIPTION DERM: LOCATION DETAILED: NASAL DORSUM

## 2019-11-06 ASSESSMENT — LOCATION ZONE DERM: LOCATION ZONE: NOSE

## 2019-11-06 NOTE — PROCEDURE: SUTURE REMOVAL (GLOBAL PERIOD)
Detail Level: Detailed
Add 31732 Cpt? (Important Note: In 2017 The Use Of 36506 Is Being Tracked By Cms To Determine Future Global Period Reimbursement For Global Periods): yes

## 2019-11-07 DIAGNOSIS — M81.0 OSTEOPOROSIS, UNSPECIFIED OSTEOPOROSIS TYPE, UNSPECIFIED PATHOLOGICAL FRACTURE PRESENCE: ICD-10-CM

## 2019-11-07 DIAGNOSIS — Z51.81 ENCOUNTER FOR MONITORING DENOSUMAB THERAPY: ICD-10-CM

## 2019-11-07 DIAGNOSIS — Z79.899 ENCOUNTER FOR MONITORING DENOSUMAB THERAPY: ICD-10-CM

## 2019-11-12 ENCOUNTER — NON-PROVIDER VISIT (OUTPATIENT)
Dept: MEDICAL GROUP | Facility: PHYSICIAN GROUP | Age: 84
End: 2019-11-12
Payer: MEDICARE

## 2019-11-12 DIAGNOSIS — Z23 NEED FOR VACCINATION: ICD-10-CM

## 2019-11-12 PROCEDURE — 90662 IIV NO PRSV INCREASED AG IM: CPT | Performed by: FAMILY MEDICINE

## 2019-11-12 PROCEDURE — G0008 ADMIN INFLUENZA VIRUS VAC: HCPCS | Performed by: FAMILY MEDICINE

## 2019-11-12 NOTE — PROGRESS NOTES
"Marry Mathur is a 84 y.o. female here for a non-provider visit for:   FLU    Reason for immunization: Annual Flu Vaccine  Immunization records indicate need for vaccine: Yes, confirmed with Epic and confirmed with [place] [facility]  Minimum interval has been met for this vaccine: Yes  ABN completed: Not Indicated    Order and dose verified by: ANISH  VIS Dated  08/15/19 was given to patient: Yes  All IAC Questionnaire questions were answered \"No.\"    Patient tolerated injection and no adverse effects were observed or reported: Yes    Pt scheduled for next dose in series: Not Indicated    "

## 2019-11-20 ENCOUNTER — APPOINTMENT (RX ONLY)
Dept: URBAN - METROPOLITAN AREA CLINIC 4 | Facility: CLINIC | Age: 84
Setting detail: DERMATOLOGY
End: 2019-11-20

## 2019-11-20 DIAGNOSIS — D22 MELANOCYTIC NEVI: ICD-10-CM

## 2019-11-20 DIAGNOSIS — L57.0 ACTINIC KERATOSIS: ICD-10-CM

## 2019-11-20 DIAGNOSIS — Z85.820 PERSONAL HISTORY OF MALIGNANT MELANOMA OF SKIN: ICD-10-CM

## 2019-11-20 PROBLEM — D48.5 NEOPLASM OF UNCERTAIN BEHAVIOR OF SKIN: Status: ACTIVE | Noted: 2019-11-20

## 2019-11-20 PROBLEM — D22.5 MELANOCYTIC NEVI OF TRUNK: Status: ACTIVE | Noted: 2019-11-20

## 2019-11-20 PROCEDURE — 99213 OFFICE O/P EST LOW 20 MIN: CPT | Mod: 25,24

## 2019-11-20 PROCEDURE — 17003 DESTRUCT PREMALG LES 2-14: CPT | Mod: 79

## 2019-11-20 PROCEDURE — 11102 TANGNTL BX SKIN SINGLE LES: CPT | Mod: 79

## 2019-11-20 PROCEDURE — ? BIOPSY BY SHAVE METHOD

## 2019-11-20 PROCEDURE — 17000 DESTRUCT PREMALG LESION: CPT | Mod: 59,79

## 2019-11-20 PROCEDURE — ? COUNSELING

## 2019-11-20 PROCEDURE — ? LIQUID NITROGEN

## 2019-11-20 PROCEDURE — 11103 TANGNTL BX SKIN EA SEP/ADDL: CPT | Mod: 79

## 2019-11-20 ASSESSMENT — LOCATION DETAILED DESCRIPTION DERM
LOCATION DETAILED: INFERIOR THORACIC SPINE
LOCATION DETAILED: LEFT PROXIMAL DORSAL FOREARM
LOCATION DETAILED: RIGHT PROXIMAL POSTERIOR UPPER ARM
LOCATION DETAILED: LEFT SUPERIOR PARIETAL SCALP
LOCATION DETAILED: POSTERIOR MID-PARIETAL SCALP
LOCATION DETAILED: RIGHT SUPERIOR PARIETAL SCALP

## 2019-11-20 ASSESSMENT — LOCATION SIMPLE DESCRIPTION DERM
LOCATION SIMPLE: POSTERIOR SCALP
LOCATION SIMPLE: UPPER BACK
LOCATION SIMPLE: SCALP
LOCATION SIMPLE: LEFT FOREARM
LOCATION SIMPLE: RIGHT POSTERIOR UPPER ARM

## 2019-11-20 ASSESSMENT — LOCATION ZONE DERM
LOCATION ZONE: TRUNK
LOCATION ZONE: SCALP
LOCATION ZONE: ARM

## 2019-11-20 NOTE — PROCEDURE: BIOPSY BY SHAVE METHOD
Biopsy Method: Personna blade
Detail Level: Detailed
Curettage Text: The wound bed was treated with curettage after the biopsy was performed.
Billing Type: Third-Party Bill
Lab: [id number]
Silver Nitrate Text: The wound bed was treated with silver nitrate after the biopsy was performed.
Hemostasis: Drysol
Bill 14102 For Specimen Handling/Conveyance To Laboratory?: no
Additional Anesthesia Volume In Cc (Will Not Render If 0): 0
Cryotherapy Text: The wound bed was treated with cryotherapy after the biopsy was performed.
Lab Facility: 
Notification Instructions: Patient will be notified of biopsy results. However, patient instructed to call the office if not contacted within 2 weeks.
Post-Care Instructions: I reviewed with the patient in detail post-care instructions. Patient is to keep the biopsy site dry overnight, and then apply bacitracin twice daily until healed. Patient may apply hydrogen peroxide soaks to remove any crusting.
Anesthesia Type: 1% lidocaine with epinephrine
Consent: Written consent was obtained and risks were reviewed including but not limited to scarring, infection, bleeding, scabbing, incomplete removal, nerve damage and allergy to anesthesia.
Depth Of Biopsy: dermis
Wound Care: Petrolatum
Biopsy Type: H and E
Electrodesiccation Text: The wound bed was treated with electrodesiccation after the biopsy was performed.
Dressing: bandage
Type Of Destruction Used: Curettage
Anesthesia Volume In Cc: 0.5
Electrodesiccation And Curettage Text: The wound bed was treated with electrodesiccation and curettage after the biopsy was performed.
Was A Bandage Applied: Yes
Anticipated Plan (Based On Presumed Biopsy Results): Exc

## 2019-11-20 NOTE — PROCEDURE: LIQUID NITROGEN
Consent: The patient's consent was obtained including but not limited to risks of blistering, scarring, darker or lighter pigmentary change, and recurrence.
Post-Care Instructions: I reviewed with the patient in detail post-care instructions. Patient is to wear sunprotection, and avoid picking at any of the treated lesions. Pt may apply Vaseline to crusted or scabbing areas.
Render Note In Bullet Format When Appropriate: No
Duration Of Freeze Thaw-Cycle (Seconds): 0
Number Of Freeze-Thaw Cycles: 2 freeze-thaw cycles
Detail Level: Detailed

## 2019-11-26 ENCOUNTER — HOSPITAL ENCOUNTER (OUTPATIENT)
Dept: LAB | Facility: MEDICAL CENTER | Age: 84
End: 2019-11-26
Attending: INTERNAL MEDICINE
Payer: MEDICARE

## 2019-11-26 DIAGNOSIS — Z51.81 ENCOUNTER FOR MONITORING DENOSUMAB THERAPY: ICD-10-CM

## 2019-11-26 DIAGNOSIS — Z79.899 ENCOUNTER FOR MONITORING DENOSUMAB THERAPY: ICD-10-CM

## 2019-11-26 DIAGNOSIS — M81.0 OSTEOPOROSIS, UNSPECIFIED OSTEOPOROSIS TYPE, UNSPECIFIED PATHOLOGICAL FRACTURE PRESENCE: ICD-10-CM

## 2019-11-26 LAB
ALBUMIN SERPL BCP-MCNC: 4.3 G/DL (ref 3.2–4.9)
ALBUMIN/GLOB SERPL: 1.9 G/DL
ALP SERPL-CCNC: 45 U/L (ref 30–99)
ALT SERPL-CCNC: 12 U/L (ref 2–50)
ANION GAP SERPL CALC-SCNC: 7 MMOL/L (ref 0–11.9)
AST SERPL-CCNC: 18 U/L (ref 12–45)
BILIRUB SERPL-MCNC: 0.7 MG/DL (ref 0.1–1.5)
BUN SERPL-MCNC: 26 MG/DL (ref 8–22)
CALCIUM SERPL-MCNC: 8.8 MG/DL (ref 8.5–10.5)
CHLORIDE SERPL-SCNC: 107 MMOL/L (ref 96–112)
CO2 SERPL-SCNC: 29 MMOL/L (ref 20–33)
CREAT SERPL-MCNC: 0.78 MG/DL (ref 0.5–1.4)
FASTING STATUS PATIENT QL REPORTED: NORMAL
GLOBULIN SER CALC-MCNC: 2.3 G/DL (ref 1.9–3.5)
GLUCOSE SERPL-MCNC: 81 MG/DL (ref 65–99)
POTASSIUM SERPL-SCNC: 4.1 MMOL/L (ref 3.6–5.5)
PROT SERPL-MCNC: 6.6 G/DL (ref 6–8.2)
SODIUM SERPL-SCNC: 143 MMOL/L (ref 135–145)

## 2019-11-26 PROCEDURE — 80053 COMPREHEN METABOLIC PANEL: CPT

## 2019-11-26 PROCEDURE — 36415 COLL VENOUS BLD VENIPUNCTURE: CPT

## 2019-12-02 ENCOUNTER — ANTICOAGULATION VISIT (OUTPATIENT)
Dept: MEDICAL GROUP | Facility: PHYSICIAN GROUP | Age: 84
End: 2019-12-02
Payer: MEDICARE

## 2019-12-02 DIAGNOSIS — D68.59 PROTEIN S DEFICIENCY (HCC): ICD-10-CM

## 2019-12-02 DIAGNOSIS — D68.59 PROTEIN S DEFICIENCY (HCC): Primary | ICD-10-CM

## 2019-12-02 DIAGNOSIS — I48.91 ATRIAL FIBRILLATION, UNSPECIFIED TYPE (HCC): ICD-10-CM

## 2019-12-02 PROCEDURE — 99211 OFF/OP EST MAY X REQ PHY/QHP: CPT | Performed by: FAMILY MEDICINE

## 2019-12-02 NOTE — PROGRESS NOTES
Target end date:Indefinite     Indication: Atrial fibrillation, DVT     Drug: Xarelto 20 mg daily     CHADsVASC = 4    Health Status Since Last Assessment   Patient denies any new relevant medical problems, ED visits or hospitalizations   Patient denies any embolic events (stroke/tia/systemic embolism)    Adherence with DOAC Therapy   Pt has not missed any doses in the average week    Bleeding Risk Assessment     Denies Epistaxis   Pt denies any excessive or unusual bleeding/hematomas.  Pt denies any GI bleeds or hematemesis.  Pt denies any concerning daily headache or sub dural hematoma symptoms.     Pt denies any hematuria or abnormal vaginal bleeding.   Latest Hemoglobin 14.8 in Aug 2019   ETOH overuse Denies     Creatinine Clearance/Renal Function     Latest ClCr > 60 ml/min on 11/26/19     Drug Interactions   Platelets: 181 on Aug 2019   ASA/other antiplatelets none   NSAID none   Other drug interactions none   Verified no anticonvulsant or azole therapy, education provided for future use.     Examination   Symptomatic hypotension no   Significant gait impairment/imbalance/high risk for falls? no    Final Assessment and Recommendations:   Patient appears stable from the anticoagulation staindpoint.     Benefits of continued DOAC therapy outweigh risks for this patient   Recommend pt continue with current DOAC therapy      Other Actions: cmp/ cbc hemogram ordered prior to next visit    Follow up:   Will follow up with patient 6  months.        Polly Danielle, PerlitaD

## 2019-12-09 ENCOUNTER — NON-PROVIDER VISIT (OUTPATIENT)
Dept: RHEUMATOLOGY | Facility: MEDICAL CENTER | Age: 84
End: 2019-12-09
Payer: MEDICARE

## 2019-12-09 VITALS
RESPIRATION RATE: 16 BRPM | DIASTOLIC BLOOD PRESSURE: 80 MMHG | OXYGEN SATURATION: 98 % | TEMPERATURE: 97.3 F | SYSTOLIC BLOOD PRESSURE: 130 MMHG | HEART RATE: 69 BPM

## 2019-12-09 DIAGNOSIS — M05.79 RHEUMATOID ARTHRITIS INVOLVING MULTIPLE SITES WITH POSITIVE RHEUMATOID FACTOR (HCC): ICD-10-CM

## 2019-12-09 DIAGNOSIS — M81.0 AGE RELATED OSTEOPOROSIS, UNSPECIFIED PATHOLOGICAL FRACTURE PRESENCE: ICD-10-CM

## 2019-12-09 DIAGNOSIS — M06.30 RHEUMATOID NODULE (HCC): ICD-10-CM

## 2019-12-09 PROCEDURE — 96372 THER/PROPH/DIAG INJ SC/IM: CPT | Performed by: INTERNAL MEDICINE

## 2019-12-09 NOTE — NON-PROVIDER
Marry Mathur is a 84 y.o. female here for a non-provider visit for 60mg Prolia  injection.    Reason for injection: Osteoporosis  Order in MAR?: Yes  Patient supplied?:No  Minimum interval has been met for this injection (per MAR order): Yes    Order and dose verified by: Dr Escoto  Patient tolerated injection and no adverse effects were observed or reported: Yes    # of Administrations remaining in MAR: 5

## 2019-12-17 ENCOUNTER — OFFICE VISIT (OUTPATIENT)
Dept: URGENT CARE | Facility: PHYSICIAN GROUP | Age: 84
End: 2019-12-17
Payer: MEDICARE

## 2019-12-17 VITALS
TEMPERATURE: 97.6 F | HEART RATE: 64 BPM | SYSTOLIC BLOOD PRESSURE: 124 MMHG | DIASTOLIC BLOOD PRESSURE: 80 MMHG | RESPIRATION RATE: 16 BRPM | HEIGHT: 63 IN | BODY MASS INDEX: 30.3 KG/M2 | OXYGEN SATURATION: 94 % | WEIGHT: 171 LBS

## 2019-12-17 DIAGNOSIS — I48.91 ATRIAL FIBRILLATION, UNSPECIFIED TYPE (HCC): ICD-10-CM

## 2019-12-17 DIAGNOSIS — B37.2 CANDIDAL INTERTRIGO: ICD-10-CM

## 2019-12-17 DIAGNOSIS — Z79.01 CHRONIC ANTICOAGULATION: ICD-10-CM

## 2019-12-17 PROCEDURE — 99214 OFFICE O/P EST MOD 30 MIN: CPT | Performed by: PHYSICIAN ASSISTANT

## 2019-12-17 RX ORDER — NYSTATIN 100000 [USP'U]/G
POWDER TOPICAL
Qty: 15 G | Refills: 0 | Status: SHIPPED | OUTPATIENT
Start: 2019-12-17 | End: 2020-01-13

## 2019-12-17 RX ORDER — NYSTATIN 100000 U/G
OINTMENT TOPICAL
Qty: 1 TUBE | Refills: 0 | Status: SHIPPED
Start: 2019-12-17 | End: 2019-12-17

## 2019-12-17 ASSESSMENT — ENCOUNTER SYMPTOMS
CHILLS: 0
FEVER: 0

## 2019-12-17 NOTE — PROGRESS NOTES
"Subjective:   Marry Mathur  is a 84 y.o. female who presents for Rash (Under R Breast, x3wks)    This is a new problem.  Patient presents to urgent care with 3-week history of painful rash under right breast that has been worsening.  She reports that the skin is now broken and extremely painful.  She has been applying fluorouracil to the area with no improvement.    Patient has a history of atrial fibrillation and chronic anticoagulation with Xarelto.      Rash   Pertinent negatives include no fever.     Review of Systems   Constitutional: Negative for chills, fever and malaise/fatigue.   Skin: Positive for rash. Negative for itching.   All other systems reviewed and are negative.    Allergies   Allergen Reactions   • Sulfa Drugs Vomiting     RXN=young person     Reviewed past medical, surgical and family history.  Reviewed prescription and over-the-counter medications with patient and electronic health record today.     Objective:   /80 (BP Location: Right arm, Patient Position: Sitting, BP Cuff Size: Adult)   Pulse 64   Temp 36.4 °C (97.6 °F) (Temporal)   Resp 16   Ht 1.6 m (5' 3\")   Wt 77.6 kg (171 lb)   SpO2 94%   BMI 30.29 kg/m²   Physical Exam  Vitals signs reviewed.   Constitutional:       Appearance: She is well-developed.   HENT:      Head: Normocephalic and atraumatic.      Right Ear: External ear normal.      Left Ear: External ear normal.      Nose: Nose normal.      Mouth/Throat:      Mouth: Mucous membranes are moist.   Eyes:      Extraocular Movements: Extraocular movements intact.      Conjunctiva/sclera: Conjunctivae normal.      Pupils: Pupils are equal, round, and reactive to light.   Neck:      Musculoskeletal: Normal range of motion and neck supple.   Cardiovascular:      Rate and Rhythm: Normal rate and regular rhythm.      Heart sounds: Normal heart sounds.   Pulmonary:      Effort: Pulmonary effort is normal.      Breath sounds: Normal breath sounds.   Musculoskeletal: " Normal range of motion.   Lymphadenopathy:      Cervical: No cervical adenopathy.   Skin:     General: Skin is warm and dry.      Findings: No rash.             Comments: Right submammary area with linear open area of excoriation with mild erythema, moist macerated tissue   Neurological:      Mental Status: She is alert and oriented to person, place, and time.      Cranial Nerves: Cranial nerves are intact.      Sensory: Sensation is intact.      Motor: Motor function is intact.      Coordination: Coordination is intact.   Psychiatric:         Attention and Perception: Attention normal.         Mood and Affect: Mood normal.         Speech: Speech normal.         Behavior: Behavior normal.         Thought Content: Thought content normal.         Judgment: Judgment normal.           Assessment/Plan:   1. Candidal intertrigo  - nystatin (MYCOSTATIN) powder; Apply small amount to affected area qid until resolved  Dispense: 15 g; Refill: 0    2. Chronic anticoagulation    3. Atrial fibrillation, unspecified type (HCC)    Discontinue fluorouracil.  Patient will be treated with nystatin powder as above.  Recommend open to air as much as possible and air dry area.  Given the patient's chronic anticoagulation with Xarelto I would like to avoid oral medication if possible.    Patient has an appointment to establish care with new primary care in January which she is encouraged to keep.      Differential diagnosis, natural history, supportive care, and indications for immediate follow-up discussed.     Red flag warning symptoms and strict ER/follow-up precautions given.  The patient demonstrated a good understanding and agreed with the treatment plan.  Please note that this note was created using voice recognition speech to text software. Every effort has been made to correct obvious errors.  However, I expect there are errors of grammar and possibly context that were not discovered prior to finalizing the note  TWIN Leos  ADRY

## 2019-12-17 NOTE — PATIENT INSTRUCTIONS
Intertrigo  Introduction  Intertrigo is skin irritation (inflammation) that happens in warm, moist areas of the body. The irritation can cause a rash and make skin raw and itchy. The rash is usually pink or red. It happens mostly between folds of skin or where skin rubs together, such as:  · Toes.  · Armpits.  · Groin.  · Belly.  · Breasts.  · Buttocks.  This condition is not passed from person to person (is not contagious).  Follow these instructions at home:  · Keep the affected area clean and dry.  · Do not scratch your skin.  · Stay cool as much as possible. Use an air conditioner or fan, if you can.  · Apply over-the-counter and prescription medicines only as told by your doctor.  · If you were prescribed an antibiotic medicine, use it as told by your doctor. Do not stop using the antibiotic even if your condition starts to get better.  · Keep all follow-up visits as told by your doctor. This is important.  How is this prevented?  · Stay at a healthy weight.  · Keep your feet dry. This is very important if you have diabetes. Wear cotton or wool socks.  · Take care of and protect the skin in your groin and butt area as told by your doctor.  · Do not wear tight clothes. Wear clothes that:  ¨ Are loose.  ¨ Take away moisture from your body.  ¨ Are made of cotton.  · Wear a bra that gives good support, if needed.  · Shower and dry yourself fully after being active.  · Keep your blood sugar under control if you have diabetes.  Contact a doctor if:  · Your symptoms do not get better with treatment.  · Your symptoms get worse or they spread.  · You notice more redness and warmth.  · You have a fever.  This information is not intended to replace advice given to you by your health care provider. Make sure you discuss any questions you have with your health care provider.  Document Released: 01/20/2012 Document Revised: 05/25/2017 Document Reviewed: 06/20/2016  © 2017 [employer]

## 2020-01-06 ENCOUNTER — APPOINTMENT (RX ONLY)
Dept: URBAN - METROPOLITAN AREA CLINIC 4 | Facility: CLINIC | Age: 85
Setting detail: DERMATOLOGY
End: 2020-01-06

## 2020-01-06 PROBLEM — C44.41 BASAL CELL CARCINOMA OF SKIN OF SCALP AND NECK: Status: ACTIVE | Noted: 2020-01-06

## 2020-01-06 PROCEDURE — 11622 EXC S/N/H/F/G MAL+MRG 1.1-2: CPT | Mod: 79

## 2020-01-06 PROCEDURE — ? EXCISION

## 2020-01-06 PROCEDURE — 12032 INTMD RPR S/A/T/EXT 2.6-7.5: CPT | Mod: 79

## 2020-01-06 NOTE — PROCEDURE: EXCISION
S Plasty Text: Given the location and shape of the defect, and the orientation of relaxed skin tension lines, an S-plasty was deemed most appropriate for repair.  Using a sterile surgical marker, the appropriate outline of the S-plasty was drawn, incorporating the defect and placing the expected incisions within the relaxed skin tension lines where possible.  The area thus outlined was incised deep to adipose tissue with a #15 scalpel blade.  The skin margins were undermined to an appropriate distance in all directions utilizing iris scissors. The skin flaps were advanced over the defect.  The opposing margins were then approximated with interrupted buried subcutaneous sutures.
Double M-Plasty Complex Repair Preamble Text (Leave Blank If You Do Not Want): Extensive wide undermining was performed.
Skin Substitute Text: The defect edges were debeveled with a #15 scalpel blade.  Given the location of the defect, shape of the defect and the proximity to free margins a skin substitute graft was deemed most appropriate.  The graft material was trimmed to fit the size of the defect. The graft was then placed in the primary defect and oriented appropriately.
Crescentic Advancement Flap Text: The defect edges were debeveled with a #15 scalpel blade.  Given the location of the defect and the proximity to free margins a crescentic advancement flap was deemed most appropriate.  Using a sterile surgical marker, the appropriate advancement flap was drawn incorporating the defect and placing the expected incisions within the relaxed skin tension lines where possible.    The area thus outlined was incised deep to adipose tissue with a #15 scalpel blade.  The skin margins were undermined to an appropriate distance in all directions utilizing iris scissors.
Show Repair Anesthesia Variables: Yes
Rhomboid Transposition Flap Text: The defect edges were debeveled with a #15 scalpel blade.  Given the location of the defect and the proximity to free margins a rhomboid transposition flap was deemed most appropriate.  Using a sterile surgical marker, an appropriate rhomboid flap was drawn incorporating the defect.    The area thus outlined was incised deep to adipose tissue with a #15 scalpel blade.  The skin margins were undermined to an appropriate distance in all directions utilizing iris scissors.
Complex Repair And M Plasty Text: The defect edges were debeveled with a #15 scalpel blade.  The primary defect was closed partially with a complex linear closure.  Given the location of the remaining defect, shape of the defect and the proximity to free margins an M plasty was deemed most appropriate for complete closure of the defect.  Using a sterile surgical marker, an appropriate advancement flap was drawn incorporating the defect and placing the expected incisions within the relaxed skin tension lines where possible.    The area thus outlined was incised deep to adipose tissue with a #15 scalpel blade.  The skin margins were undermined to an appropriate distance in all directions utilizing iris scissors.
Complex Requirements: Exposure Of Vital Structure?: No
Rhombic Flap Text: The defect edges were debeveled with a #15 scalpel blade.  Given the location of the defect and the proximity to free margins a rhombic flap was deemed most appropriate.  Using a sterile surgical marker, an appropriate rhombic flap was drawn incorporating the defect.    The area thus outlined was incised deep to adipose tissue with a #15 scalpel blade.  The skin margins were undermined to an appropriate distance in all directions utilizing iris scissors.
Epidermal Sutures: 5-0 Nylon
A-T Advancement Flap Text: The defect edges were debeveled with a #15 scalpel blade.  Given the location of the defect, shape of the defect and the proximity to free margins an A-T advancement flap was deemed most appropriate.  Using a sterile surgical marker, an appropriate advancement flap was drawn incorporating the defect and placing the expected incisions within the relaxed skin tension lines where possible.    The area thus outlined was incised deep to adipose tissue with a #15 scalpel blade.  The skin margins were undermined to an appropriate distance in all directions utilizing iris scissors.
Tissue Cultured Epidermal Autograft Text: The defect edges were debeveled with a #15 scalpel blade.  Given the location of the defect, shape of the defect and the proximity to free margins a tissue cultured epidermal autograft was deemed most appropriate.  The graft was then trimmed to fit the size of the defect.  The graft was then placed in the primary defect and oriented appropriately.
Consent was obtained from the patient. The risks and benefits to therapy were discussed in detail. Specifically, the risks of infection, scarring, bleeding, prolonged wound healing, incomplete removal, allergy to anesthesia, nerve injury and recurrence were addressed. Prior to the procedure, the treatment site was clearly identified and confirmed by the patient.
Bi-Rhombic Flap Text: The defect edges were debeveled with a #15 scalpel blade.  Given the location of the defect and the proximity to free margins a bi-rhombic flap was deemed most appropriate.  Using a sterile surgical marker, an appropriate rhombic flap was drawn incorporating the defect. The area thus outlined was incised deep to adipose tissue with a #15 scalpel blade.  The skin margins were undermined to an appropriate distance in all directions utilizing iris scissors.
Complex Repair And Double M Plasty Text: The defect edges were debeveled with a #15 scalpel blade.  The primary defect was closed partially with a complex linear closure.  Given the location of the remaining defect, shape of the defect and the proximity to free margins a double M plasty was deemed most appropriate for complete closure of the defect.  Using a sterile surgical marker, an appropriate advancement flap was drawn incorporating the defect and placing the expected incisions within the relaxed skin tension lines where possible.    The area thus outlined was incised deep to adipose tissue with a #15 scalpel blade.  The skin margins were undermined to an appropriate distance in all directions utilizing iris scissors.
V-Y Plasty Text: The defect edges were debeveled with a #15 scalpel blade.  Given the location of the defect, shape of the defect and the proximity to free margins an V-Y advancement flap was deemed most appropriate.  Using a sterile surgical marker, an appropriate advancement flap was drawn incorporating the defect and placing the expected incisions within the relaxed skin tension lines where possible.    The area thus outlined was incised deep to adipose tissue with a #15 scalpel blade.  The skin margins were undermined to an appropriate distance in all directions utilizing iris scissors.
Anesthesia Volume In Cc: 0
Detail Level: Detailed
Crescentic Intermediate Repair Preamble Text (Leave Blank If You Do Not Want): Undermining was performed with blunt dissection.
Scalpel Size: 15 blade
H Plasty Text: Given the location of the defect, shape of the defect and the proximity to free margins a H-plasty was deemed most appropriate for repair.  Using a sterile surgical marker, the appropriate advancement arms of the H-plasty were drawn incorporating the defect and placing the expected incisions within the relaxed skin tension lines where possible. The area thus outlined was incised deep to adipose tissue with a #15 scalpel blade. The skin margins were undermined to an appropriate distance in all directions utilizing iris scissors.  The opposing advancement arms were then advanced into place in opposite direction and anchored with interrupted buried subcutaneous sutures.
Xenograft Text: The defect edges were debeveled with a #15 scalpel blade.  Given the location of the defect, shape of the defect and the proximity to free margins a xenograft was deemed most appropriate.  The graft was then trimmed to fit the size of the defect.  The graft was then placed in the primary defect and oriented appropriately.
O-T Advancement Flap Text: The defect edges were debeveled with a #15 scalpel blade.  Given the location of the defect, shape of the defect and the proximity to free margins an O-T advancement flap was deemed most appropriate.  Using a sterile surgical marker, an appropriate advancement flap was drawn incorporating the defect and placing the expected incisions within the relaxed skin tension lines where possible.    The area thus outlined was incised deep to adipose tissue with a #15 scalpel blade.  The skin margins were undermined to an appropriate distance in all directions utilizing iris scissors.
Complex Repair And W Plasty Text: The defect edges were debeveled with a #15 scalpel blade.  The primary defect was closed partially with a complex linear closure.  Given the location of the remaining defect, shape of the defect and the proximity to free margins a W plasty was deemed most appropriate for complete closure of the defect.  Using a sterile surgical marker, an appropriate advancement flap was drawn incorporating the defect and placing the expected incisions within the relaxed skin tension lines where possible.    The area thus outlined was incised deep to adipose tissue with a #15 scalpel blade.  The skin margins were undermined to an appropriate distance in all directions utilizing iris scissors.
Helical Rim Advancement Flap Text: The defect edges were debeveled with a #15 blade scalpel.  Given the location of the defect and the proximity to free margins (helical rim) a double helical rim advancement flap was deemed most appropriate.  Using a sterile surgical marker, the appropriate advancement flaps were drawn incorporating the defect and placing the expected incisions between the helical rim and antihelix where possible.  The area thus outlined was incised through and through with a #15 scalpel blade.  With a skin hook and iris scissors, the flaps were gently and sharply undermined and freed up.
Repair Type: Intermediate
Post-Care Instructions: I reviewed with the patient in detail post-care instructions. Patient is not to engage in any heavy lifting, exercise, or swimming for the next 14 days. Should the patient develop any fevers, chills, bleeding, severe pain patient will contact the office immediately.
O-L Flap Text: The defect edges were debeveled with a #15 scalpel blade.  Given the location of the defect, shape of the defect and the proximity to free margins an O-L flap was deemed most appropriate.  Using a sterile surgical marker, an appropriate advancement flap was drawn incorporating the defect and placing the expected incisions within the relaxed skin tension lines where possible.    The area thus outlined was incised deep to adipose tissue with a #15 scalpel blade.  The skin margins were undermined to an appropriate distance in all directions utilizing iris scissors.
Purse String (Intermediate) Text: Given the location of the defect and the characteristics of the surrounding skin a purse string intermediate closure was deemed most appropriate.  Undermining was performed circumfirentially around the surgical defect.  A purse string suture was then placed and tightened.
Bilateral Helical Rim Advancement Flap Text: The defect edges were debeveled with a #15 blade scalpel.  Given the location of the defect and the proximity to free margins (helical rim) a bilateral helical rim advancement flap was deemed most appropriate.  Using a sterile surgical marker, the appropriate advancement flaps were drawn incorporating the defect and placing the expected incisions between the helical rim and antihelix where possible.  The area thus outlined was incised through and through with a #15 scalpel blade.  With a skin hook and iris scissors, the flaps were gently and sharply undermined and freed up.
Complex Repair And Z Plasty Text: The defect edges were debeveled with a #15 scalpel blade.  The primary defect was closed partially with a complex linear closure.  Given the location of the remaining defect, shape of the defect and the proximity to free margins a Z plasty was deemed most appropriate for complete closure of the defect.  Using a sterile surgical marker, an appropriate advancement flap was drawn incorporating the defect and placing the expected incisions within the relaxed skin tension lines where possible.    The area thus outlined was incised deep to adipose tissue with a #15 scalpel blade.  The skin margins were undermined to an appropriate distance in all directions utilizing iris scissors.
W Plasty Text: The lesion was extirpated to the level of the fat with a #15 scalpel blade.  Given the location of the defect, shape of the defect and the proximity to free margins a W-plasty was deemed most appropriate for repair.  Using a sterile surgical marker, the appropriate transposition arms of the W-plasty were drawn incorporating the defect and placing the expected incisions within the relaxed skin tension lines where possible.    The area thus outlined was incised deep to adipose tissue with a #15 scalpel blade.  The skin margins were undermined to an appropriate distance in all directions utilizing iris scissors.  The opposing transposition arms were then transposed into place in opposite direction and anchored with interrupted buried subcutaneous sutures.
Body Location Override (Optional - Billing Will Still Be Based On Selected Body Map Location If Applicable): left inferior post auricular skin
Curvilinear Excision Additional Text (Leave Blank If You Do Not Want): The margin was drawn around the clinically apparent lesion.  A curvilinear shape was then drawn on the skin incorporating the lesion and margins.  Incisions were then made along these lines to the appropriate tissue plane and the lesion was extirpated.
Double O-Z Flap Text: The defect edges were debeveled with a #15 scalpel blade.  Given the location of the defect, shape of the defect and the proximity to free margins a Double O-Z flap was deemed most appropriate.  Using a sterile surgical marker, an appropriate transposition flap was drawn incorporating the defect and placing the expected incisions within the relaxed skin tension lines where possible. The area thus outlined was incised deep to adipose tissue with a #15 scalpel blade.  The skin margins were undermined to an appropriate distance in all directions utilizing iris scissors.
Partial Purse String (Intermediate) Text: Given the location of the defect and the characteristics of the surrounding skin an intermediate purse string closure was deemed most appropriate.  Undermining was performed circumferentially around the surgical defect.  A purse string suture was then placed and tightened. Wound tension of the circular defect prevented complete closure of the wound.
Complex Repair And Ftsg Text: The defect edges were debeveled with a #15 scalpel blade.  The primary defect was closed partially with a complex linear closure.  Given the location of the defect, shape of the defect and the proximity to free margins a full thickness skin graft was deemed most appropriate to repair the remaining defect.  The graft was trimmed to fit the size of the remaining defect.  The graft was then placed in the primary defect, oriented appropriately, and sutured into place.
Banner Transposition Flap Text: The defect edges were debeveled with a #15 scalpel blade.  Given the location of the defect and the proximity to free margins a Banner transposition flap was deemed most appropriate.  Using a sterile surgical marker, an appropriate flap drawn around the defect. The area thus outlined was incised deep to adipose tissue with a #15 scalpel blade.  The skin margins were undermined to an appropriate distance in all directions utilizing iris scissors.
Home Suture Removal Text: Patient was provided a home suture removal kit and will remove their sutures at home.  If they have any questions or difficulties they will call the office.
Where Do You Want The Question To Include Opioid Counseling Located?: Case Summary Tab
O-Z Flap Text: The defect edges were debeveled with a #15 scalpel blade.  Given the location of the defect, shape of the defect and the proximity to free margins an O-Z flap was deemed most appropriate.  Using a sterile surgical marker, an appropriate transposition flap was drawn incorporating the defect and placing the expected incisions within the relaxed skin tension lines where possible. The area thus outlined was incised deep to adipose tissue with a #15 scalpel blade.  The skin margins were undermined to an appropriate distance in all directions utilizing iris scissors.
Purse String (Simple) Text: Given the location of the defect and the characteristics of the surrounding skin a purse string simple closure was deemed most appropriate.  Undermining was performed circumferentially around the surgical defect.  A purse string suture was then placed and tightened.
Ear Star Wedge Flap Text: The defect edges were debeveled with a #15 blade scalpel.  Given the location of the defect and the proximity to free margins (helical rim) an ear star wedge flap was deemed most appropriate.  Using a sterile surgical marker, the appropriate flap was drawn incorporating the defect and placing the expected incisions between the helical rim and antihelix where possible.  The area thus outlined was incised through and through with a #15 scalpel blade.
Complex Repair And Dorsal Nasal Flap Text: The defect edges were debeveled with a #15 scalpel blade.  The primary defect was closed partially with a complex linear closure.  Given the location of the remaining defect, shape of the defect and the proximity to free margins a dorsal nasal flap was deemed most appropriate for complete closure of the defect.  Using a sterile surgical marker, an appropriate flap was drawn incorporating the defect and placing the expected incisions within the relaxed skin tension lines where possible.    The area thus outlined was incised deep to adipose tissue with a #15 scalpel blade.  The skin margins were undermined to an appropriate distance in all directions utilizing iris scissors.
Z Plasty Text: The lesion was extirpated to the level of the fat with a #15 scalpel blade.  Given the location of the defect, shape of the defect and the proximity to free margins a Z-plasty was deemed most appropriate for repair.  Using a sterile surgical marker, the appropriate transposition arms of the Z-plasty were drawn incorporating the defect and placing the expected incisions within the relaxed skin tension lines where possible.    The area thus outlined was incised deep to adipose tissue with a #15 scalpel blade.  The skin margins were undermined to an appropriate distance in all directions utilizing iris scissors.  The opposing transposition arms were then transposed into place in opposite direction and anchored with interrupted buried subcutaneous sutures.
Fusiform Excision Additional Text (Leave Blank If You Do Not Want): The margin was drawn around the clinically apparent lesion.  A fusiform shape was then drawn on the skin incorporating the lesion and margins.  Incisions were then made along these lines to the appropriate tissue plane and the lesion was extirpated.
Epidermal Closure: simple interrupted
Partial Purse String (Simple) Text: Given the location of the defect and the characteristics of the surrounding skin a simple purse string closure was deemed most appropriate.  Undermining was performed circumferentially around the surgical defect.  A purse string suture was then placed and tightened. Wound tension of the circular defect prevented complete closure of the wound.
V-Y Flap Text: The defect edges were debeveled with a #15 scalpel blade.  Given the location of the defect, shape of the defect and the proximity to free margins a V-Y flap was deemed most appropriate.  Using a sterile surgical marker, an appropriate advancement flap was drawn incorporating the defect and placing the expected incisions within the relaxed skin tension lines where possible.    The area thus outlined was incised deep to adipose tissue with a #15 scalpel blade.  The skin margins were undermined to an appropriate distance in all directions utilizing iris scissors.
Complex Repair And Split-Thickness Skin Graft Text: The defect edges were debeveled with a #15 scalpel blade.  The primary defect was closed partially with a complex linear closure.  Given the location of the defect, shape of the defect and the proximity to free margins a split thickness skin graft was deemed most appropriate to repair the remaining defect.  The graft was trimmed to fit the size of the remaining defect.  The graft was then placed in the primary defect, oriented appropriately, and sutured into place.
Bilobed Flap Text: The defect edges were debeveled with a #15 scalpel blade.  Given the location of the defect and the proximity to free margins a bilobe flap was deemed most appropriate.  Using a sterile surgical marker, an appropriate bilobe flap drawn around the defect.    The area thus outlined was incised deep to adipose tissue with a #15 scalpel blade.  The skin margins were undermined to an appropriate distance in all directions utilizing iris scissors.
Cheek-To-Nose Interpolation Flap Text: A decision was made to reconstruct the defect utilizing an interpolation axial flap and a staged reconstruction.  A telfa template was made of the defect.  This telfa template was then used to outline the Cheek-To-Nose Interpolation flap.  The donor area for the pedicle flap was then injected with anesthesia.  The flap was excised through the skin and subcutaneous tissue down to the layer of the underlying musculature.  The interpolation flap was carefully excised within this deep plane to maintain its blood supply.  The edges of the donor site were undermined.   The donor site was closed in a primary fashion.  The pedicle was then rotated into position and sutured.  Once the tube was sutured into place, adequate blood supply was confirmed with blanching and refill.  The pedicle was then wrapped with xeroform gauze and dressed appropriately with a telfa and gauze bandage to ensure continued blood supply and protect the attached pedicle.
Cheek Interpolation Flap Text: A decision was made to reconstruct the defect utilizing an interpolation axial flap and a staged reconstruction.  A telfa template was made of the defect.  This telfa template was then used to outline the Cheek Interpolation flap.  The donor area for the pedicle flap was then injected with anesthesia.  The flap was excised through the skin and subcutaneous tissue down to the layer of the underlying musculature.  The interpolation flap was carefully excised within this deep plane to maintain its blood supply.  The edges of the donor site were undermined.   The donor site was closed in a primary fashion.  The pedicle was then rotated into position and sutured.  Once the tube was sutured into place, adequate blood supply was confirmed with blanching and refill.  The pedicle was then wrapped with xeroform gauze and dressed appropriately with a telfa and gauze bandage to ensure continued blood supply and protect the attached pedicle.
Intermediate / Complex Repair - Final Wound Length In Cm: 2.6
Complex Repair And Epidermal Autograft Text: The defect edges were debeveled with a #15 scalpel blade.  The primary defect was closed partially with a complex linear closure.  Given the location of the defect, shape of the defect and the proximity to free margins an epidermal autograft was deemed most appropriate to repair the remaining defect.  The graft was trimmed to fit the size of the remaining defect.  The graft was then placed in the primary defect, oriented appropriately, and sutured into place.
Advancement-Rotation Flap Text: The defect edges were debeveled with a #15 scalpel blade.  Given the location of the defect, shape of the defect and the proximity to free margins an advancement-rotation flap was deemed most appropriate.  Using a sterile surgical marker, an appropriate flap was drawn incorporating the defect and placing the expected incisions within the relaxed skin tension lines where possible. The area thus outlined was incised deep to adipose tissue with a #15 scalpel blade.  The skin margins were undermined to an appropriate distance in all directions utilizing iris scissors.
Dressing: pressure dressing
Bilobed Transposition Flap Text: The defect edges were debeveled with a #15 scalpel blade.  Given the location of the defect and the proximity to free margins a bilobed transposition flap was deemed most appropriate.  Using a sterile surgical marker, an appropriate bilobe flap drawn around the defect.    The area thus outlined was incised deep to adipose tissue with a #15 scalpel blade.  The skin margins were undermined to an appropriate distance in all directions utilizing iris scissors.
Elliptical Excision Additional Text (Leave Blank If You Do Not Want): The margin was drawn around the clinically apparent lesion.  An elliptical shape was then drawn on the skin incorporating the lesion and margins.  Incisions were then made along these lines to the appropriate tissue plane and the lesion was extirpated.
Interpolation Flap Text: A decision was made to reconstruct the defect utilizing an interpolation axial flap and a staged reconstruction.  A telfa template was made of the defect.  This telfa template was then used to outline the interpolation flap.  The donor area for the pedicle flap was then injected with anesthesia.  The flap was excised through the skin and subcutaneous tissue down to the layer of the underlying musculature.  The interpolation flap was carefully excised within this deep plane to maintain its blood supply.  The edges of the donor site were undermined.   The donor site was closed in a primary fashion.  The pedicle was then rotated into position and sutured.  Once the tube was sutured into place, adequate blood supply was confirmed with blanching and refill.  The pedicle was then wrapped with xeroform gauze and dressed appropriately with a telfa and gauze bandage to ensure continued blood supply and protect the attached pedicle.
Anesthesia Type: 1% lidocaine with epinephrine
Complex Repair And Single Advancement Flap Text: The defect edges were debeveled with a #15 scalpel blade.  The primary defect was closed partially with a complex linear closure.  Given the location of the remaining defect, shape of the defect and the proximity to free margins a single advancement flap was deemed most appropriate for complete closure of the defect.  Using a sterile surgical marker, an appropriate advancement flap was drawn incorporating the defect and placing the expected incisions within the relaxed skin tension lines where possible.    The area thus outlined was incised deep to adipose tissue with a #15 scalpel blade.  The skin margins were undermined to an appropriate distance in all directions utilizing iris scissors.
Melolabial Interpolation Flap Text: A decision was made to reconstruct the defect utilizing an interpolation axial flap and a staged reconstruction.  A telfa template was made of the defect.  This telfa template was then used to outline the melolabial interpolation flap.  The donor area for the pedicle flap was then injected with anesthesia.  The flap was excised through the skin and subcutaneous tissue down to the layer of the underlying musculature.  The pedicle flap was carefully excised within this deep plane to maintain its blood supply.  The edges of the donor site were undermined.   The donor site was closed in a primary fashion.  The pedicle was then rotated into position and sutured.  Once the tube was sutured into place, adequate blood supply was confirmed with blanching and refill.  The pedicle was then wrapped with xeroform gauze and dressed appropriately with a telfa and gauze bandage to ensure continued blood supply and protect the attached pedicle.
Saucerization Excision Additional Text (Leave Blank If You Do Not Want): The margin was drawn around the clinically apparent lesion.  Incisions were then made along these lines, in a tangential fashion, to the appropriate tissue plane and the lesion was extirpated.
Complex Repair And Double Advancement Flap Text: The defect edges were debeveled with a #15 scalpel blade.  The primary defect was closed partially with a complex linear closure.  Given the location of the remaining defect, shape of the defect and the proximity to free margins a double advancement flap was deemed most appropriate for complete closure of the defect.  Using a sterile surgical marker, an appropriate advancement flap was drawn incorporating the defect and placing the expected incisions within the relaxed skin tension lines where possible.    The area thus outlined was incised deep to adipose tissue with a #15 scalpel blade.  The skin margins were undermined to an appropriate distance in all directions utilizing iris scissors.
Debridement Text: The wound edges were debrided prior to proceeding with the closure to facilitate wound healing.
Suture Removal: 10 days
Mercedes Flap Text: The defect edges were debeveled with a #15 scalpel blade.  Given the location of the defect, shape of the defect and the proximity to free margins a Mercedes flap was deemed most appropriate.  Using a sterile surgical marker, an appropriate advancement flap was drawn incorporating the defect and placing the expected incisions within the relaxed skin tension lines where possible. The area thus outlined was incised deep to adipose tissue with a #15 scalpel blade.  The skin margins were undermined to an appropriate distance in all directions utilizing iris scissors.
Trilobed Flap Text: The defect edges were debeveled with a #15 scalpel blade.  Given the location of the defect and the proximity to free margins a trilobed flap was deemed most appropriate.  Using a sterile surgical marker, an appropriate trilobed flap drawn around the defect.    The area thus outlined was incised deep to adipose tissue with a #15 scalpel blade.  The skin margins were undermined to an appropriate distance in all directions utilizing iris scissors.
Complex Repair And Dermal Autograft Text: The defect edges were debeveled with a #15 scalpel blade.  The primary defect was closed partially with a complex linear closure.  Given the location of the defect, shape of the defect and the proximity to free margins an dermal autograft was deemed most appropriate to repair the remaining defect.  The graft was trimmed to fit the size of the remaining defect.  The graft was then placed in the primary defect, oriented appropriately, and sutured into place.
Complex Repair And Modified Advancement Flap Text: The defect edges were debeveled with a #15 scalpel blade.  The primary defect was closed partially with a complex linear closure.  Given the location of the remaining defect, shape of the defect and the proximity to free margins a modified advancement flap was deemed most appropriate for complete closure of the defect.  Using a sterile surgical marker, an appropriate advancement flap was drawn incorporating the defect and placing the expected incisions within the relaxed skin tension lines where possible.    The area thus outlined was incised deep to adipose tissue with a #15 scalpel blade.  The skin margins were undermined to an appropriate distance in all directions utilizing iris scissors.
Wound Care: Petrolatum
Undermining Location (Optional): in the superficial subcutaneous fat
Mucosal Advancement Flap Text: Given the location of the defect, shape of the defect and the proximity to free margins a mucosal advancement flap was deemed most appropriate. Incisions were made with a 15 blade scalpel in the appropriate fashion along the cutaneous vermilion border and the mucosal lip. The remaining actinically damaged mucosal tissue was excised.  The mucosal advancement flap was then elevated to the gingival sulcus with care taken to preserve the neurovascular structures and advanced into the primary defect. Care was taken to ensure that precise realignment of the vermilion border was achieved.
Modified Advancement Flap Text: The defect edges were debeveled with a #15 scalpel blade.  Given the location of the defect, shape of the defect and the proximity to free margins a modified advancement flap was deemed most appropriate.  Using a sterile surgical marker, an appropriate advancement flap was drawn incorporating the defect and placing the expected incisions within the relaxed skin tension lines where possible.    The area thus outlined was incised deep to adipose tissue with a #15 scalpel blade.  The skin margins were undermined to an appropriate distance in all directions utilizing iris scissors.
Anesthesia Volume In Cc: 6
Complex Repair And Tissue Cultured Epidermal Autograft Text: The defect edges were debeveled with a #15 scalpel blade.  The primary defect was closed partially with a complex linear closure.  Given the location of the defect, shape of the defect and the proximity to free margins an tissue cultured epidermal autograft was deemed most appropriate to repair the remaining defect.  The graft was trimmed to fit the size of the remaining defect.  The graft was then placed in the primary defect, oriented appropriately, and sutured into place.
Dorsal Nasal Flap Text: The defect edges were debeveled with a #15 scalpel blade.  Given the location of the defect and the proximity to free margins a dorsal nasal flap was deemed most appropriate.  Using a sterile surgical marker, an appropriate dorsal nasal flap was drawn around the defect.    The area thus outlined was incised deep to adipose tissue with a #15 scalpel blade.  The skin margins were undermined to an appropriate distance in all directions utilizing iris scissors.
Slit Excision Additional Text (Leave Blank If You Do Not Want): A linear line was drawn on the skin overlying the lesion. An incision was made slowly until the lesion was visualized.  Once visualized, the lesion was removed with blunt dissection.
Mastoid Interpolation Flap Text: A decision was made to reconstruct the defect utilizing an interpolation axial flap and a staged reconstruction.  A telfa template was made of the defect.  This telfa template was then used to outline the mastoid interpolation flap.  The donor area for the pedicle flap was then injected with anesthesia.  The flap was excised through the skin and subcutaneous tissue down to the layer of the underlying musculature.  The pedicle flap was carefully excised within this deep plane to maintain its blood supply.  The edges of the donor site were undermined.   The donor site was closed in a primary fashion.  The pedicle was then rotated into position and sutured.  Once the tube was sutured into place, adequate blood supply was confirmed with blanching and refill.  The pedicle was then wrapped with xeroform gauze and dressed appropriately with a telfa and gauze bandage to ensure continued blood supply and protect the attached pedicle.
Additional Anesthesia Volume In Cc: 9
Hatchet Flap Text: The defect edges were debeveled with a #15 scalpel blade.  Given the location of the defect, shape of the defect and the proximity to free margins a hatchet flap was deemed most appropriate.  Using a sterile surgical marker, an appropriate hatchet flap was drawn incorporating the defect and placing the expected incisions within the relaxed skin tension lines where possible.    The area thus outlined was incised deep to adipose tissue with a #15 scalpel blade.  The skin margins were undermined to an appropriate distance in all directions utilizing iris scissors.
Complex Repair And Skin Substitute Graft Text: The defect edges were debeveled with a #15 scalpel blade.  The primary defect was closed partially with a complex linear closure.  Given the location of the remaining defect, shape of the defect and the proximity to free margins a skin substitute graft was deemed most appropriate to repair the remaining defect.  The graft was trimmed to fit the size of the remaining defect.  The graft was then placed in the primary defect, oriented appropriately, and sutured into place.
Island Pedicle Flap With Canthal Suspension Text: The defect edges were debeveled with a #15 scalpel blade.  Given the location of the defect, shape of the defect and the proximity to free margins an island pedicle advancement flap was deemed most appropriate.  Using a sterile surgical marker, an appropriate advancement flap was drawn incorporating the defect, outlining the appropriate donor tissue and placing the expected incisions within the relaxed skin tension lines where possible. The area thus outlined was incised deep to adipose tissue with a #15 scalpel blade.  The skin margins were undermined to an appropriate distance in all directions around the primary defect and laterally outward around the island pedicle utilizing iris scissors.  There was minimal undermining beneath the pedicle flap. A suspension suture was placed in the canthal tendon to prevent tension and prevent ectropion.
Complex Repair And Xenograft Text: The defect edges were debeveled with a #15 scalpel blade.  The primary defect was closed partially with a complex linear closure.  Given the location of the defect, shape of the defect and the proximity to free margins a xenograft was deemed most appropriate to repair the remaining defect.  The graft was trimmed to fit the size of the remaining defect.  The graft was then placed in the primary defect, oriented appropriately, and sutured into place.
Island Pedicle Flap Text: The defect edges were debeveled with a #15 scalpel blade.  Given the location of the defect, shape of the defect and the proximity to free margins an island pedicle advancement flap was deemed most appropriate.  Using a sterile surgical marker, an appropriate advancement flap was drawn incorporating the defect, outlining the appropriate donor tissue and placing the expected incisions within the relaxed skin tension lines where possible.    The area thus outlined was incised deep to adipose tissue with a #15 scalpel blade.  The skin margins were undermined to an appropriate distance in all directions around the primary defect and laterally outward around the island pedicle utilizing iris scissors.  There was minimal undermining beneath the pedicle flap.
Posterior Auricular Interpolation Flap Text: A decision was made to reconstruct the defect utilizing an interpolation axial flap and a staged reconstruction.  A telfa template was made of the defect.  This telfa template was then used to outline the posterior auricular interpolation flap.  The donor area for the pedicle flap was then injected with anesthesia.  The flap was excised through the skin and subcutaneous tissue down to the layer of the underlying musculature.  The pedicle flap was carefully excised within this deep plane to maintain its blood supply.  The edges of the donor site were undermined.   The donor site was closed in a primary fashion.  The pedicle was then rotated into position and sutured.  Once the tube was sutured into place, adequate blood supply was confirmed with blanching and refill.  The pedicle was then wrapped with xeroform gauze and dressed appropriately with a telfa and gauze bandage to ensure continued blood supply and protect the attached pedicle.
Complex Repair And A-T Advancement Flap Text: The defect edges were debeveled with a #15 scalpel blade.  The primary defect was closed partially with a complex linear closure.  Given the location of the remaining defect, shape of the defect and the proximity to free margins an A-T advancement flap was deemed most appropriate for complete closure of the defect.  Using a sterile surgical marker, an appropriate advancement flap was drawn incorporating the defect and placing the expected incisions within the relaxed skin tension lines where possible.    The area thus outlined was incised deep to adipose tissue with a #15 scalpel blade.  The skin margins were undermined to an appropriate distance in all directions utilizing iris scissors.
Excisional Biopsy Additional Text (Leave Blank If You Do Not Want): The margin was drawn around the clinically apparent lesion. An elliptical shape was then drawn on the skin incorporating the lesion and margins.  Incisions were then made along these lines to the appropriate tissue plane and the lesion was extirpated.
Billing Type: Third-Party Bill
Deep Sutures: 5-0 Polysorb
Rotation Flap Text: The defect edges were debeveled with a #15 scalpel blade.  Given the location of the defect, shape of the defect and the proximity to free margins a rotation flap was deemed most appropriate.  Using a sterile surgical marker, an appropriate rotation flap was drawn incorporating the defect and placing the expected incisions within the relaxed skin tension lines where possible.    The area thus outlined was incised deep to adipose tissue with a #15 scalpel blade.  The skin margins were undermined to an appropriate distance in all directions utilizing iris scissors.
Positioning (Leave Blank If You Do Not Want): The patient was placed in a comfortable position exposing the surgical site.
Alar Island Pedicle Flap Text: The defect edges were debeveled with a #15 scalpel blade.  Given the location of the defect, shape of the defect and the proximity to the alar rim an island pedicle advancement flap was deemed most appropriate.  Using a sterile surgical marker, an appropriate advancement flap was drawn incorporating the defect, outlining the appropriate donor tissue and placing the expected incisions within the nasal ala running parallel to the alar rim. The area thus outlined was incised with a #15 scalpel blade.  The skin margins were undermined minimally to an appropriate distance in all directions around the primary defect and laterally outward around the island pedicle utilizing iris scissors.  There was minimal undermining beneath the pedicle flap.
Lip Wedge Excision Repair Text: Given the location of the defect and the proximity to free margins a full thickness wedge repair was deemed most appropriate.  Using a sterile surgical marker, the appropriate repair was drawn incorporating the defect and placing the expected incisions perpendicular to the vermilion border.  The vermilion border was also meticulously outlined to ensure appropriate reapproximation during the repair.  The area thus outlined was incised through and through with a #15 scalpel blade.  The muscularis and dermis were reaproximated with deep sutures following hemostasis. Care was taken to realign the vermilion border before proceeding with the superficial closure.  Once the vermilion was realigned the superfical and mucosal closure was finished.
Paramedian Forehead Flap Text: A decision was made to reconstruct the defect utilizing an interpolation axial flap and a staged reconstruction.  A telfa template was made of the defect.  This telfa template was then used to outline the paramedian forehead pedicle flap.  The donor area for the pedicle flap was then injected with anesthesia.  The flap was excised through the skin and subcutaneous tissue down to the layer of the underlying musculature.  The pedicle flap was carefully excised within this deep plane to maintain its blood supply.  The edges of the donor site were undermined.   The donor site was closed in a primary fashion.  The pedicle was then rotated into position and sutured.  Once the tube was sutured into place, adequate blood supply was confirmed with blanching and refill.  The pedicle was then wrapped with xeroform gauze and dressed appropriately with a telfa and gauze bandage to ensure continued blood supply and protect the attached pedicle.
Perilesional Excision Additional Text (Leave Blank If You Do Not Want): The margin was drawn around the clinically apparent lesion. Incisions were then made along these lines to the appropriate tissue plane and the lesion was extirpated.
Complex Repair And O-T Advancement Flap Text: The defect edges were debeveled with a #15 scalpel blade.  The primary defect was closed partially with a complex linear closure.  Given the location of the remaining defect, shape of the defect and the proximity to free margins an O-T advancement flap was deemed most appropriate for complete closure of the defect.  Using a sterile surgical marker, an appropriate advancement flap was drawn incorporating the defect and placing the expected incisions within the relaxed skin tension lines where possible.    The area thus outlined was incised deep to adipose tissue with a #15 scalpel blade.  The skin margins were undermined to an appropriate distance in all directions utilizing iris scissors.
Helical Rim Text: The closure involved the helical rim.
Double Island Pedicle Flap Text: The defect edges were debeveled with a #15 scalpel blade.  Given the location of the defect, shape of the defect and the proximity to free margins a double island pedicle advancement flap was deemed most appropriate.  Using a sterile surgical marker, an appropriate advancement flap was drawn incorporating the defect, outlining the appropriate donor tissue and placing the expected incisions within the relaxed skin tension lines where possible.    The area thus outlined was incised deep to adipose tissue with a #15 scalpel blade.  The skin margins were undermined to an appropriate distance in all directions around the primary defect and laterally outward around the island pedicle utilizing iris scissors.  There was minimal undermining beneath the pedicle flap.
Pre-Excision Curettage Text (Leave Blank If You Do Not Want): Prior to drawing the surgical margin the visible lesion was removed with electrodesiccation and curettage to clearly define the lesion size.
Ftsg Text: The defect edges were debeveled with a #15 scalpel blade.  Given the location of the defect, shape of the defect and the proximity to free margins a full thickness skin graft was deemed most appropriate.  Using a sterile surgical marker, the primary defect shape was transferred to the donor site. The area thus outlined was incised deep to adipose tissue with a #15 scalpel blade.  The harvested graft was then trimmed of adipose tissue until only dermis and epidermis was left.  The skin margins of the secondary defect were undermined to an appropriate distance in all directions utilizing iris scissors.  The secondary defect was closed with interrupted buried subcutaneous sutures.  The skin edges were then re-apposed with running  sutures.  The skin graft was then placed in the primary defect and oriented appropriately.
Repair Performed By Another Provider Text (Leave Blank If You Do Not Want): After the tissue was excised the defect was repaired by another provider.
Complex Repair And Bilobe Flap Text: The defect edges were debeveled with a #15 scalpel blade.  The primary defect was closed partially with a complex linear closure.  Given the location of the remaining defect, shape of the defect and the proximity to free margins a bilobe flap was deemed most appropriate for complete closure of the defect.  Using a sterile surgical marker, an appropriate advancement flap was drawn incorporating the defect and placing the expected incisions within the relaxed skin tension lines where possible.    The area thus outlined was incised deep to adipose tissue with a #15 scalpel blade.  The skin margins were undermined to an appropriate distance in all directions utilizing iris scissors.
Nostril Rim Text: The closure involved the nostril rim.
Complex Repair And O-L Flap Text: The defect edges were debeveled with a #15 scalpel blade.  The primary defect was closed partially with a complex linear closure.  Given the location of the remaining defect, shape of the defect and the proximity to free margins an O-L flap was deemed most appropriate for complete closure of the defect.  Using a sterile surgical marker, an appropriate flap was drawn incorporating the defect and placing the expected incisions within the relaxed skin tension lines where possible.    The area thus outlined was incised deep to adipose tissue with a #15 scalpel blade.  The skin margins were undermined to an appropriate distance in all directions utilizing iris scissors.
Vermilion Border Text: The closure involved the vermilion border.
Spiral Flap Text: The defect edges were debeveled with a #15 scalpel blade.  Given the location of the defect, shape of the defect and the proximity to free margins a spiral flap was deemed most appropriate.  Using a sterile surgical marker, an appropriate rotation flap was drawn incorporating the defect and placing the expected incisions within the relaxed skin tension lines where possible. The area thus outlined was incised deep to adipose tissue with a #15 scalpel blade.  The skin margins were undermined to an appropriate distance in all directions utilizing iris scissors.
Split-Thickness Skin Graft Text: The defect edges were debeveled with a #15 scalpel blade.  Given the location of the defect, shape of the defect and the proximity to free margins a split thickness skin graft was deemed most appropriate.  Using a sterile surgical marker, the primary defect shape was transferred to the donor site. The split thickness graft was then harvested.  The skin graft was then placed in the primary defect and oriented appropriately.
No Repair - Repaired With Adjacent Surgical Defect Text (Leave Blank If You Do Not Want): After the excision the defect was repaired concurrently with another surgical defect which was in close approximation.
Star Wedge Flap Text: The defect edges were debeveled with a #15 scalpel blade.  Given the location of the defect, shape of the defect and the proximity to free margins a star wedge flap was deemed most appropriate.  Using a sterile surgical marker, an appropriate rotation flap was drawn incorporating the defect and placing the expected incisions within the relaxed skin tension lines where possible. The area thus outlined was incised deep to adipose tissue with a #15 scalpel blade.  The skin margins were undermined to an appropriate distance in all directions utilizing iris scissors.
Complex Repair And Melolabial Flap Text: The defect edges were debeveled with a #15 scalpel blade.  The primary defect was closed partially with a complex linear closure.  Given the location of the remaining defect, shape of the defect and the proximity to free margins a melolabial flap was deemed most appropriate for complete closure of the defect.  Using a sterile surgical marker, an appropriate advancement flap was drawn incorporating the defect and placing the expected incisions within the relaxed skin tension lines where possible.    The area thus outlined was incised deep to adipose tissue with a #15 scalpel blade.  The skin margins were undermined to an appropriate distance in all directions utilizing iris scissors.
Size Of Lesion In Cm: 0.8
Hemostasis: Electrocautery
Island Pedicle Flap-Requiring Vessel Identification Text: The defect edges were debeveled with a #15 scalpel blade.  Given the location of the defect, shape of the defect and the proximity to free margins an island pedicle advancement flap was deemed most appropriate.  Using a sterile surgical marker, an appropriate advancement flap was drawn, based on the axial vessel mentioned above, incorporating the defect, outlining the appropriate donor tissue and placing the expected incisions within the relaxed skin tension lines where possible.    The area thus outlined was incised deep to adipose tissue with a #15 scalpel blade.  The skin margins were undermined to an appropriate distance in all directions around the primary defect and laterally outward around the island pedicle utilizing iris scissors.  There was minimal undermining beneath the pedicle flap.
Transposition Flap Text: The defect edges were debeveled with a #15 scalpel blade.  Given the location of the defect and the proximity to free margins a transposition flap was deemed most appropriate.  Using a sterile surgical marker, an appropriate transposition flap was drawn incorporating the defect.    The area thus outlined was incised deep to adipose tissue with a #15 scalpel blade.  The skin margins were undermined to an appropriate distance in all directions utilizing iris scissors.
Complex Repair And Rotation Flap Text: The defect edges were debeveled with a #15 scalpel blade.  The primary defect was closed partially with a complex linear closure.  Given the location of the remaining defect, shape of the defect and the proximity to free margins a rotation flap was deemed most appropriate for complete closure of the defect.  Using a sterile surgical marker, an appropriate advancement flap was drawn incorporating the defect and placing the expected incisions within the relaxed skin tension lines where possible.    The area thus outlined was incised deep to adipose tissue with a #15 scalpel blade.  The skin margins were undermined to an appropriate distance in all directions utilizing iris scissors.
Lab: [id number]
Keystone Flap Text: The defect edges were debeveled with a #15 scalpel blade.  Given the location of the defect, shape of the defect a keystone flap was deemed most appropriate.  Using a sterile surgical marker, an appropriate keystone flap was drawn incorporating the defect, outlining the appropriate donor tissue and placing the expected incisions within the relaxed skin tension lines where possible. The area thus outlined was incised deep to adipose tissue with a #15 scalpel blade.  The skin margins were undermined to an appropriate distance in all directions around the primary defect and laterally outward around the flap utilizing iris scissors.
Advancement Flap (Single) Text: The defect edges were debeveled with a #15 scalpel blade.  Given the location of the defect and the proximity to free margins a single advancement flap was deemed most appropriate.  Using a sterile surgical marker, an appropriate advancement flap was drawn incorporating the defect and placing the expected incisions within the relaxed skin tension lines where possible.    The area thus outlined was incised deep to adipose tissue with a #15 scalpel blade.  The skin margins were undermined to an appropriate distance in all directions utilizing iris scissors.
Cartilage Graft Text: The defect edges were debeveled with a #15 scalpel blade.  Given the location of the defect, shape of the defect, the fact the defect involved a full thickness cartilage defect a cartilage graft was deemed most appropriate.  An appropriate donor site was identified, cleansed, and anesthetized. The cartilage graft was then harvested and transferred to the recipient site, oriented appropriately and then sutured into place.  The secondary defect was then repaired using a primary closure.
Lab Facility: 
Size Of Margin In Cm: 0.4
O-Z Plasty Text: The defect edges were debeveled with a #15 scalpel blade.  Given the location of the defect, shape of the defect and the proximity to free margins an O-Z plasty (double transposition flap) was deemed most appropriate.  Using a sterile surgical marker, the appropriate transposition flaps were drawn incorporating the defect and placing the expected incisions within the relaxed skin tension lines where possible.    The area thus outlined was incised deep to adipose tissue with a #15 scalpel blade.  The skin margins were undermined to an appropriate distance in all directions utilizing iris scissors.  Hemostasis was achieved with electrocautery.  The flaps were then transposed into place, one clockwise and the other counterclockwise, and anchored with interrupted buried subcutaneous sutures.
O-T Plasty Text: The defect edges were debeveled with a #15 scalpel blade.  Given the location of the defect, shape of the defect and the proximity to free margins an O-T plasty was deemed most appropriate.  Using a sterile surgical marker, an appropriate O-T plasty was drawn incorporating the defect and placing the expected incisions within the relaxed skin tension lines where possible.    The area thus outlined was incised deep to adipose tissue with a #15 scalpel blade.  The skin margins were undermined to an appropriate distance in all directions utilizing iris scissors.
Estimated Blood Loss (Cc): minimal
Composite Graft Text: The defect edges were debeveled with a #15 scalpel blade.  Given the location of the defect, shape of the defect, the proximity to free margins and the fact the defect was full thickness a composite graft was deemed most appropriate.  The defect was outline and then transferred to the donor site.  A full thickness graft was then excised from the donor site. The graft was then placed in the primary defect, oriented appropriately and then sutured into place.  The secondary defect was then repaired using a primary closure.
Advancement Flap (Double) Text: The defect edges were debeveled with a #15 scalpel blade.  Given the location of the defect and the proximity to free margins a double advancement flap was deemed most appropriate.  Using a sterile surgical marker, the appropriate advancement flaps were drawn incorporating the defect and placing the expected incisions within the relaxed skin tension lines where possible.    The area thus outlined was incised deep to adipose tissue with a #15 scalpel blade.  The skin margins were undermined to an appropriate distance in all directions utilizing iris scissors.
Retention Suture Text: Retention sutures were placed to support the closure and prevent dehiscence.
Muscle Hinge Flap Text: The defect edges were debeveled with a #15 scalpel blade.  Given the size, depth and location of the defect and the proximity to free margins a muscle hinge flap was deemed most appropriate.  Using a sterile surgical marker, an appropriate hinge flap was drawn incorporating the defect. The area thus outlined was incised with a #15 scalpel blade.  The skin margins were undermined to an appropriate distance in all directions utilizing iris scissors.
Complex Repair And Rhombic Flap Text: The defect edges were debeveled with a #15 scalpel blade.  The primary defect was closed partially with a complex linear closure.  Given the location of the remaining defect, shape of the defect and the proximity to free margins a rhombic flap was deemed most appropriate for complete closure of the defect.  Using a sterile surgical marker, an appropriate advancement flap was drawn incorporating the defect and placing the expected incisions within the relaxed skin tension lines where possible.    The area thus outlined was incised deep to adipose tissue with a #15 scalpel blade.  The skin margins were undermined to an appropriate distance in all directions utilizing iris scissors.
Excision Depth: adipose tissue
Double O-Z Plasty Text: The defect edges were debeveled with a #15 scalpel blade.  Given the location of the defect, shape of the defect and the proximity to free margins a Double O-Z plasty (double transposition flap) was deemed most appropriate.  Using a sterile surgical marker, the appropriate transposition flaps were drawn incorporating the defect and placing the expected incisions within the relaxed skin tension lines where possible. The area thus outlined was incised deep to adipose tissue with a #15 scalpel blade.  The skin margins were undermined to an appropriate distance in all directions utilizing iris scissors.  Hemostasis was achieved with electrocautery.  The flaps were then transposed into place, one clockwise and the other counterclockwise, and anchored with interrupted buried subcutaneous sutures.
Graft Donor Site Bandage (Optional-Leave Blank If You Don't Want In Note): Steri-strips and a pressure bandage were applied to the donor site.
Dermal Autograft Text: The defect edges were debeveled with a #15 scalpel blade.  Given the location of the defect, shape of the defect and the proximity to free margins a dermal autograft was deemed most appropriate.  Using a sterile surgical marker, the primary defect shape was transferred to the donor site. The area thus outlined was incised deep to adipose tissue with a #15 scalpel blade.  The harvested graft was then trimmed of adipose and epidermal tissue until only dermis was left.  The skin graft was then placed in the primary defect and oriented appropriately.
Complex Repair And V-Y Plasty Text: The defect edges were debeveled with a #15 scalpel blade.  The primary defect was closed partially with a complex linear closure.  Given the location of the remaining defect, shape of the defect and the proximity to free margins a V-Y plasty was deemed most appropriate for complete closure of the defect.  Using a sterile surgical marker, an appropriate advancement flap was drawn incorporating the defect and placing the expected incisions within the relaxed skin tension lines where possible.    The area thus outlined was incised deep to adipose tissue with a #15 scalpel blade.  The skin margins were undermined to an appropriate distance in all directions utilizing iris scissors.
Chonodrocutaneous Helical Advancement Flap Text: The defect edges were debeveled with a #15 scalpel blade.  Given the location of the defect and the proximity to free margins a chondrocutaneous helical advancement flap was deemed most appropriate.  Using a sterile surgical marker, the appropriate advancement flap was drawn incorporating the defect and placing the expected incisions within the relaxed skin tension lines where possible.    The area thus outlined was incised deep to adipose tissue with a #15 scalpel blade.  The skin margins were undermined to an appropriate distance in all directions utilizing iris scissors.
Information: Selecting Yes will display possible errors in your note based on the variables you have selected. This validation is only offered as a suggestion for you. PLEASE NOTE THAT THE VALIDATION TEXT WILL BE REMOVED WHEN YOU FINALIZE YOUR NOTE. IF YOU WANT TO FAX A PRELIMINARY NOTE YOU WILL NEED TO TOGGLE THIS TO 'NO' IF YOU DO NOT WANT IT IN YOUR FAXED NOTE.
Undermining Type: Entire Wound
Burow's Advancement Flap Text: The defect edges were debeveled with a #15 scalpel blade.  Given the location of the defect and the proximity to free margins a Burow's advancement flap was deemed most appropriate.  Using a sterile surgical marker, the appropriate advancement flap was drawn incorporating the defect and placing the expected incisions within the relaxed skin tension lines where possible.    The area thus outlined was incised deep to adipose tissue with a #15 scalpel blade.  The skin margins were undermined to an appropriate distance in all directions utilizing iris scissors.
Epidermal Autograft Text: The defect edges were debeveled with a #15 scalpel blade.  Given the location of the defect, shape of the defect and the proximity to free margins an epidermal autograft was deemed most appropriate.  Using a sterile surgical marker, the primary defect shape was transferred to the donor site. The epidermal graft was then harvested.  The skin graft was then placed in the primary defect and oriented appropriately.
Path Notes (To The Dermatopathologist): Please check margins.
Complex Repair And Transposition Flap Text: The defect edges were debeveled with a #15 scalpel blade.  The primary defect was closed partially with a complex linear closure.  Given the location of the remaining defect, shape of the defect and the proximity to free margins a transposition flap was deemed most appropriate for complete closure of the defect.  Using a sterile surgical marker, an appropriate advancement flap was drawn incorporating the defect and placing the expected incisions within the relaxed skin tension lines where possible.    The area thus outlined was incised deep to adipose tissue with a #15 scalpel blade.  The skin margins were undermined to an appropriate distance in all directions utilizing iris scissors.
Melolabial Transposition Flap Text: The defect edges were debeveled with a #15 scalpel blade.  Given the location of the defect and the proximity to free margins a melolabial flap was deemed most appropriate.  Using a sterile surgical marker, an appropriate melolabial transposition flap was drawn incorporating the defect.    The area thus outlined was incised deep to adipose tissue with a #15 scalpel blade.  The skin margins were undermined to an appropriate distance in all directions utilizing iris scissors.
Excision Method: Elliptical

## 2020-01-13 ENCOUNTER — OFFICE VISIT (OUTPATIENT)
Dept: MEDICAL GROUP | Facility: PHYSICIAN GROUP | Age: 85
End: 2020-01-13
Payer: MEDICARE

## 2020-01-13 VITALS
BODY MASS INDEX: 30.12 KG/M2 | OXYGEN SATURATION: 93 % | RESPIRATION RATE: 12 BRPM | DIASTOLIC BLOOD PRESSURE: 70 MMHG | SYSTOLIC BLOOD PRESSURE: 122 MMHG | WEIGHT: 170 LBS | TEMPERATURE: 97.8 F | HEIGHT: 63 IN | HEART RATE: 74 BPM

## 2020-01-13 DIAGNOSIS — D68.59 PROTEIN S DEFICIENCY (HCC): ICD-10-CM

## 2020-01-13 DIAGNOSIS — I48.91 ATRIAL FIBRILLATION, UNSPECIFIED TYPE (HCC): ICD-10-CM

## 2020-01-13 DIAGNOSIS — C18.9 ADENOCARCINOMA OF COLON (HCC): ICD-10-CM

## 2020-01-13 DIAGNOSIS — I82.5Y2 CHRONIC DEEP VEIN THROMBOSIS (DVT) OF PROXIMAL VEIN OF LEFT LOWER EXTREMITY (HCC): ICD-10-CM

## 2020-01-13 DIAGNOSIS — K21.9 GASTROESOPHAGEAL REFLUX DISEASE WITHOUT ESOPHAGITIS: ICD-10-CM

## 2020-01-13 DIAGNOSIS — E78.5 HYPERLIPIDEMIA, UNSPECIFIED HYPERLIPIDEMIA TYPE: ICD-10-CM

## 2020-01-13 DIAGNOSIS — M54.50 CHRONIC MIDLINE LOW BACK PAIN WITHOUT SCIATICA: ICD-10-CM

## 2020-01-13 DIAGNOSIS — I10 ESSENTIAL HYPERTENSION, BENIGN: ICD-10-CM

## 2020-01-13 DIAGNOSIS — G89.29 CHRONIC MIDLINE LOW BACK PAIN WITHOUT SCIATICA: ICD-10-CM

## 2020-01-13 DIAGNOSIS — I35.0 MILD AORTIC STENOSIS: ICD-10-CM

## 2020-01-13 DIAGNOSIS — M05.79 RHEUMATOID ARTHRITIS INVOLVING MULTIPLE SITES WITH POSITIVE RHEUMATOID FACTOR (HCC): ICD-10-CM

## 2020-01-13 DIAGNOSIS — Z76.89 ENCOUNTER TO ESTABLISH CARE: ICD-10-CM

## 2020-01-13 DIAGNOSIS — Z23 NEED FOR VACCINATION: ICD-10-CM

## 2020-01-13 PROBLEM — R10.9 FLANK PAIN: Status: RESOLVED | Noted: 2019-08-22 | Resolved: 2020-01-13

## 2020-01-13 PROBLEM — Z01.810 PREOPERATIVE CARDIOVASCULAR EXAMINATION: Status: RESOLVED | Noted: 2019-08-19 | Resolved: 2020-01-13

## 2020-01-13 PROCEDURE — 99214 OFFICE O/P EST MOD 30 MIN: CPT | Performed by: NURSE PRACTITIONER

## 2020-01-13 ASSESSMENT — PATIENT HEALTH QUESTIONNAIRE - PHQ9: CLINICAL INTERPRETATION OF PHQ2 SCORE: 0

## 2020-01-13 NOTE — ASSESSMENT & PLAN NOTE
Established problem. Currently on Prolia and Plaquenil. She is followed by Dr. Escoto with Rheumatology.

## 2020-01-13 NOTE — ASSESSMENT & PLAN NOTE
Established problem. Hx of 2 DVT. Currently on Xarelto and followed by [facility].

## 2020-01-13 NOTE — PROGRESS NOTES
Subjective:     CC: Establish care    HISTORY OF THE PRESENT ILLNESS: Patient is a 84 y.o. female. This pleasant patient is here today to establish care and discuss the following. Her prior PCP was Dr. Mchugh.    Mild aortic stenosis  Followed by Dr. Olivera with cardiology.  Her next appointment is in March.    Adenocarcinoma of colon (HCC)  Diagnosed 2018. Reports that she has had removal of polyp with cancer.     Atrial fibrillation [I48.91]  Established problem. Currently taking Xarelto and followed by [facility].    Back pain  Chronic. Currently followed by Dr. Cooper with pain clinic. Dr. Cooper refills her lidocaine patch and Lyrica. Hx of RA and followed by Rheumatology, taking Plaquenil and Prolia.     Deep vein thrombosis (CMS-HCC) ([facility])  Chronic. Currently on Xarelto. Had left leg US in October which showed chronic thrombus.     Essential hypertension, benign  BP today 122/70. Currently not on any medications.     GERD (gastroesophageal reflux disease)  Established problem. Currently taking omeprazole daily. No issues.     Hyperlipidemia  Established problem. Currently on atorvastatin. Last lipid panel normal from 2017.     Protein S deficiency (CMS-[facility]) ([facility])  Established problem. Hx of 2 DVT. Currently on Xarelto and followed by [facility].     Rheumatoid arthritis involving multiple sites with positive rheumatoid factor (CMS-[facility]) ([facility])  Established problem. Currently on Prolia and Plaquenil. She is followed by Dr. Escoto with Rheumatology.       Allergies: Sulfa drugs    Current Outpatient Medications Ordered in Epic   Medication Sig Dispense Refill   • Zoster Vac Recomb Adjuvanted (SHINGRIX) 50 MCG/0.5ML Recon Susp 0.5 mL by Intramuscular route Once for 1 dose. 0.5 mL 0   • lidocaine (LIDODERM) 5 % Patch Apply 1 Patch to skin as directed every 24 hours. 90 Patch 0   • rivaroxaban (XARELTO) 20 MG Tab tablet Take 1 Tab by mouth with dinner. 90 Tab 1   •  "hydroxychloroquine (PLAQUENIL) 200 MG Tab 1 tab po bid 180 Tab 0   • atorvastatin (LIPITOR) 10 MG Tab Take 1 Tab by mouth every evening. 90 Tab 0   • LYRICA 100 MG Cap Take 100 mg by mouth 2 times a day.     • omeprazole (PRILOSEC) 20 MG delayed-release capsule Take 20 mg by mouth every day.     • Diphenhydramine-APAP, sleep, (TYLENOL PM EXTRA STRENGTH)  MG Tab Take 2 Tabs by mouth every bedtime.     • acetaminophen (TYLENOL) 500 MG Tab Take 1,000 mg by mouth 2 Times a Day.     • Melatonin 10 MG Tab Take 1 Tab by mouth every bedtime.     • vitamin D (CHOLECALCIFEROL) 1000 UNIT Tab Take 1,000 Units by mouth every morning.     • POTASSIUM GLUCONATE Take 1 Tab by mouth every morning. Pt unsure of dose     • Calcium Carbonate-Vitamin D (CALCIUM + D PO) Take 1 Tab by mouth every bedtime.       Current Facility-Administered Medications Ordered in Epic   Medication Dose Route Frequency Provider Last Rate Last Dose   • denosumab (PROLIA) subq injection 60 mg  60 mg Subcutaneous Q6 MO Deanna Escoto M.D.   60 mg at 12/09/19 1058       Past Medical History:   Diagnosis Date   • Adenocarcinoma of colon (HCC) 10/30/2018    From sessile serrated polyp 10/2018   • Anesthesia     pt states \"one new dr scraped my esophagus when they put the tube in and I started bleeding so they couldn't operate\"    • Atrial fibrillation [I48.91] 4/19/2016     pt denies    • Back pain 6/27/2012   • Blood clotting disorder (HCC) 2012    clot in leg   • Bowel habit changes     constipation    • Cancer (HCC)     skin, colon 2018   • Cataract     belia IOL    • Chronic back pain greater than 3 months duration    • Deep vein thrombosis (HCC) 3/8/2016    First occurrence in [facility] in late 1970s Second occurrence further up in L[facility] in 2012, has been on AC since    • Essential hypertension, benign 6/27/2012   • GERD (gastroesophageal reflux disease) 6/27/2012   • Heart murmur    • Hyperlipidemia    • Hypertension     hx of, not currently    • " Impaired fasting glucose 11/2/2017   • Mild aortic stenosis 11/2/2017   • Other and unspecified hyperlipidemia 6/27/2012   • Prediabetes    • Protein S deficiency (HCC) 11/2/2017    Noted in lab work 2013 as a part of work up at [facility]    • Rheumatoid arthritis involving multiple sites with positive rheumatoid factor (HCC) 03/14/2016    Dr. Escoto   • Rheumatoid nodule (HCC) 7/25/2017   • Right bundle branch block 6/27/2012    pt denies    • Urinary incontinence 11/2/2017       Past Surgical History:   Procedure Laterality Date   • HEMICOLECTOMY Right 12/13/2018    Procedure: OPEN RIGHT HEMICOLECTOMY;  Surgeon: Dash Bhagat M.D.;  Location: SURGERY [facility];  Service: General   • INGUINAL HERNIA REPAIR Right 9/23/2016    Procedure: INGUINAL HERNIA REPAIR - PRIMARY;  Surgeon: Mary Medina M.D.;  Location: SURGERY [facility];  Service:    • OTHER  08/12/2014    peroneal nerve surgery Dr. Castro    • OTHER ORTHOPEDIC SURGERY  2014    left knee partial   • OTHER ORTHOPEDIC SURGERY  2011    meniscus repair   • ATHROPLASTY      partial TKA with Dr. Persaud   • CHOLECYSTECTOMY     • HYSTERECTOMY, TOTAL ABDOMINAL  1970's   • ROTATOR CUFF REPAIR Bilateral        Social History     Tobacco Use   • Smoking status: Never Smoker   • Smokeless tobacco: Never Used   Substance Use Topics   • Alcohol use: Not Currently     Alcohol/week: 0.0 oz     Comment: occ   • Drug use: No       Social History     Patient does not qualify to have social determinant information on file (likely too young).   Social History Narrative    Retired from [employer]. 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  Patient due for DEXA in  "March 2020, mammogram February 2020 and due for annual wellness visit this year.    ROS:   Gen: no fevers/chills, no changes in weight  Eyes: no changes in vision  Pulm: no so  CV: no chest pain, no palpitations  GI: no nausea/vomiting  MSk: no myalgias, no falls  Skin: no rash  Heme/Lymph: no easy bruising      Objective:     Vital signs reviewed  Exam: /70 (BP Location: Right arm, Patient Position: Sitting, BP Cuff Size: Adult)   Pulse 74   Temp 36.6 °C (97.8 °F) (Temporal)   Resp 12   Ht 1.6 m (5' 3\")   Wt 77.1 kg (170 lb)   SpO2 93%  Body mass index is 30.11 kg/m².    General: Normal appearing. No distress.  HENT: Normocephalic. Ears normal shape and contour, canals are clear bilaterally, tympanic membranes are benign, oropharynx is without erythema, edema or exudates.   Eyes: Eyes conjunctiva clear lids without ptosis, pupils equal and reactive to light accommodation, lids normal.  Neck: Supple without JVD. Thyroid is not enlarged.  Pulmonary: Clear to ausculation.  Normal effort. No rales, ronchi, or wheezing.  Cardiovascular: Regular rate and rhythm with systolic murmur. Radial pulses are intact and equal bilaterally.  Abdomen: Soft, nontender, nondistended. Normal bowel sounds.   Lymph: No cervical or supraclavicular lymph nodes are palpable  Skin: Warm and dry.  No obvious lesions.  Musculoskeletal: Normal gait. No extremity cyanosis, clubbing, or edema.  Psych: Normal mood and affect. Alert and oriented x3. Judgment and insight is normal.    Assessment & Plan:   84 y.o. female with the following -    1. Rheumatoid arthritis involving multiple sites with positive rheumatoid factor (HCC)  Chronic and stable.  This is new to me.  Continue Plaquenil and Prolia per rheumatology.  Continue follow-up with rheumatology.  Monitor.    2. Hyperlipidemia, unspecified hyperlipidemia type  Chronic stable.  This is new to me.  Continue atorvastatin.  Labs ordered.  Monitor and follow.  - LIPID PANEL    3. " Atrial fibrillation, unspecified type (HCC)  Chronic stable.  This is new to me.  Continue Xarelto.  Continue cardiology and anticoagulation clinic follow-up.    4. Mild aortic stenosis  Chronic stable.  This is new to me.  Continue cardiology follow-up.    5. Protein S deficiency (HCC)  Chronic stable.  This is new to me.  Continue Xarelto.  Continue anticoagulation clinic follow-up.    6. Chronic deep vein thrombosis (DVT) of proximal vein of left lower extremity (HCC)  Chronic stable.  This is new to me.  Continue Xarelto.  Continue anticoagulation clinic follow-up.    7. Adenocarcinoma of colon (HCC)  Chronic stable.  This is new to me.  Continue follow-up with GI.    8. Chronic midline low back pain without sciatica  Chronic stable.  This is new to me.  Continue Lyrica and lidocaine patch.  Continue pain management follow-up.    9. Essential hypertension, benign  Chronic stable.  This is new to me.  Currently controlled without blood pressure medicine.  Monitor BP and follow.    10. Gastroesophageal reflux disease without esophagitis  Chronic stable.  This is new to me.  Continue omeprazole daily.  Monitor.    11. Need for vaccination  Vaccination indicated.  Prescription given for Shingrix.  - Zoster Vac Recomb Adjuvanted (SHINGRIX) 50 MCG/0.5ML Recon Susp; 0.5 mL by Intramuscular route Once for 1 dose.  Dispense: 0.5 mL; Refill: 0    12. Encounter to establish care  New issue to me.  Care established.  Lipid panel ordered.  Continue follow-up with cardiology, GI, pain management, and rheumatology.      Return if symptoms worsen or fail to improve, for AWV.    Please note that this dictation was created using voice recognition software. I have made every reasonable attempt to correct obvious errors, but I expect that there are errors of grammar and possibly content that I did not discover before finalizing the note.

## 2020-01-13 NOTE — ASSESSMENT & PLAN NOTE
Chronic. Currently followed by Dr. Downey with pain clinic. Dr. downey refills her lidocaine patch and Lyrica. Hx of RA and followed by Rheumatology, taking Plaquenil and Prolia.

## 2020-01-14 ENCOUNTER — APPOINTMENT (RX ONLY)
Dept: URBAN - METROPOLITAN AREA CLINIC 36 | Facility: CLINIC | Age: 85
Setting detail: DERMATOLOGY
End: 2020-01-14

## 2020-01-14 DIAGNOSIS — Z48.817 ENCOUNTER FOR SURGICAL AFTERCARE FOLLOWING SURGERY ON THE SKIN AND SUBCUTANEOUS TISSUE: ICD-10-CM

## 2020-01-14 PROCEDURE — ? FRAXEL

## 2020-01-14 NOTE — PROCEDURE: FRAXEL
Indication: surgical scars
Treatment Level: 1
Location: nose
Energy(Mj/Cm2): 70
Treatment Level: 4
Consent: Written consent obtained, risks reviewed including but not limited to pain and incomplete improvement.
External Cooling Fan Speed: 5
Add Post-Care Below To The Note: No
Post-Care Instructions: I reviewed with the patient in detail post-care instructions. Patient should avoid sun until area fully healed.
Wavelength: 1550nm
Detail Level: Zone

## 2020-01-20 ENCOUNTER — APPOINTMENT (RX ONLY)
Dept: URBAN - METROPOLITAN AREA CLINIC 4 | Facility: CLINIC | Age: 85
Setting detail: DERMATOLOGY
End: 2020-01-20

## 2020-01-20 DIAGNOSIS — Z48.02 ENCOUNTER FOR REMOVAL OF SUTURES: ICD-10-CM

## 2020-01-20 PROBLEM — C44.519 BASAL CELL CARCINOMA OF SKIN OF OTHER PART OF TRUNK: Status: ACTIVE | Noted: 2020-01-20

## 2020-01-20 PROCEDURE — 17263 DSTRJ MAL LES T/A/L 2.1-3.0: CPT | Mod: 79

## 2020-01-20 PROCEDURE — ? SUTURE REMOVAL (GLOBAL PERIOD)

## 2020-01-20 PROCEDURE — ? CURETTAGE AND DESTRUCTION

## 2020-01-20 ASSESSMENT — LOCATION SIMPLE DESCRIPTION DERM: LOCATION SIMPLE: SCALP

## 2020-01-20 ASSESSMENT — LOCATION ZONE DERM: LOCATION ZONE: SCALP

## 2020-01-20 ASSESSMENT — LOCATION DETAILED DESCRIPTION DERM: LOCATION DETAILED: LEFT CENTRAL POSTAURICULAR SKIN

## 2020-01-20 NOTE — PROCEDURE: CURETTAGE AND DESTRUCTION
Total Volume (Ccs): 1
Anesthesia Type: 1% lidocaine with epinephrine
Bill For Surgical Tray: no
Cautery Type: electrodesiccation
Size Of Lesion After Curettage: 2.3
Number Of Curettages: 3
Concentration (Mg/Ml Or Millions Of Plaque Forming Units/Cc): 0.01
Consent was obtained from the patient. The risks, benefits and alternatives to therapy were discussed in detail. Specifically, the risks of infection, scarring, bleeding, prolonged wound healing, nerve injury, incomplete removal, allergy to anesthesia and recurrence were addressed. Alternatives to ED&C, such as: surgical removal and XRT were also discussed.  Prior to the procedure, the treatment site was clearly identified and confirmed by the patient. All components of Universal Protocol/PAUSE Rule completed.
What Was Performed First?: Curettage
Additional Information: (Optional): The wound was cleaned, and a pressure dressing was applied.  The patient received detailed post-op instructions.
Detail Level: Detailed
Post-Care Instructions: I reviewed with the patient in detail post-care instructions. Patient is to keep the area dry for 48 hours, and not to engage in any swimming until the area is healed. Should the patient develop any fevers, chills, bleeding, severe pain patient will contact the office immediately.
Size Of Lesion In Cm: 1.6
Bill As A Line Item Or As Units: Line Item

## 2020-01-20 NOTE — PROCEDURE: SUTURE REMOVAL (GLOBAL PERIOD)
Detail Level: Detailed
Add 20770 Cpt? (Important Note: In 2017 The Use Of 38695 Is Being Tracked By Cms To Determine Future Global Period Reimbursement For Global Periods): no

## 2020-01-31 ENCOUNTER — HOSPITAL ENCOUNTER (OUTPATIENT)
Dept: LAB | Facility: MEDICAL CENTER | Age: 85
End: 2020-01-31
Attending: SURGERY
Payer: MEDICARE

## 2020-01-31 LAB — CEA SERPL-MCNC: 2.2 NG/ML (ref 0–3)

## 2020-01-31 PROCEDURE — 36415 COLL VENOUS BLD VENIPUNCTURE: CPT

## 2020-01-31 PROCEDURE — 82378 CARCINOEMBRYONIC ANTIGEN: CPT

## 2020-02-10 ENCOUNTER — HOSPITAL ENCOUNTER (OUTPATIENT)
Dept: RADIOLOGY | Facility: MEDICAL CENTER | Age: 85
End: 2020-02-10
Attending: NURSE PRACTITIONER
Payer: MEDICARE

## 2020-02-10 DIAGNOSIS — Z12.31 VISIT FOR SCREENING MAMMOGRAM: ICD-10-CM

## 2020-02-10 DIAGNOSIS — M79.605 LEFT LEG PAIN: ICD-10-CM

## 2020-02-10 PROCEDURE — 77067 SCR MAMMO BI INCL CAD: CPT

## 2020-02-14 DIAGNOSIS — E78.5 HYPERLIPIDEMIA, UNSPECIFIED HYPERLIPIDEMIA TYPE: ICD-10-CM

## 2020-02-14 RX ORDER — ATORVASTATIN CALCIUM 10 MG/1
10 TABLET, FILM COATED ORAL EVERY EVENING
Qty: 90 TAB | Refills: 3 | Status: SHIPPED | OUTPATIENT
Start: 2020-02-14 | End: 2021-02-24 | Stop reason: SDUPTHER

## 2020-02-14 NOTE — TELEPHONE ENCOUNTER
Received request via: Patient    Was the patient seen in the last year in this department? Yes LOV 1/13/2020    Does the patient have an active prescription (recently filled or refills available) for medication(s) requested? No

## 2020-02-18 RX ORDER — METHYLPREDNISOLONE 4 MG/1
TABLET ORAL
Qty: 21 TAB | Refills: 0 | Status: SHIPPED | OUTPATIENT
Start: 2020-02-18 | End: 2020-03-16

## 2020-02-19 ENCOUNTER — APPOINTMENT (RX ONLY)
Dept: URBAN - METROPOLITAN AREA CLINIC 4 | Facility: CLINIC | Age: 85
Setting detail: DERMATOLOGY
End: 2020-02-19

## 2020-02-19 ENCOUNTER — APPOINTMENT (RX ONLY)
Dept: URBAN - METROPOLITAN AREA CLINIC 31 | Facility: CLINIC | Age: 85
Setting detail: DERMATOLOGY
End: 2020-02-19

## 2020-02-19 DIAGNOSIS — Z85.828 PERSONAL HISTORY OF OTHER MALIGNANT NEOPLASM OF SKIN: ICD-10-CM

## 2020-02-19 DIAGNOSIS — D22 MELANOCYTIC NEVI: ICD-10-CM

## 2020-02-19 DIAGNOSIS — L81.4 OTHER MELANIN HYPERPIGMENTATION: ICD-10-CM

## 2020-02-19 DIAGNOSIS — D18.0 HEMANGIOMA: ICD-10-CM

## 2020-02-19 DIAGNOSIS — L57.0 ACTINIC KERATOSIS: ICD-10-CM

## 2020-02-19 DIAGNOSIS — L82.1 OTHER SEBORRHEIC KERATOSIS: ICD-10-CM

## 2020-02-19 DIAGNOSIS — Z87.2 PERSONAL HISTORY OF DISEASES OF THE SKIN AND SUBCUTANEOUS TISSUE: ICD-10-CM

## 2020-02-19 DIAGNOSIS — Z86.006 PERSONAL HISTORY OF MELANOMA IN-SITU: ICD-10-CM

## 2020-02-19 DIAGNOSIS — Z85.820 PERSONAL HISTORY OF MALIGNANT MELANOMA OF SKIN: ICD-10-CM

## 2020-02-19 DIAGNOSIS — Z71.89 OTHER SPECIFIED COUNSELING: ICD-10-CM

## 2020-02-19 PROBLEM — D18.01 HEMANGIOMA OF SKIN AND SUBCUTANEOUS TISSUE: Status: ACTIVE | Noted: 2020-02-19

## 2020-02-19 PROBLEM — D22.5 MELANOCYTIC NEVI OF TRUNK: Status: ACTIVE | Noted: 2020-02-19

## 2020-02-19 PROBLEM — D48.5 NEOPLASM OF UNCERTAIN BEHAVIOR OF SKIN: Status: ACTIVE | Noted: 2020-02-19

## 2020-02-19 PROCEDURE — ? COUNSELING

## 2020-02-19 PROCEDURE — 99213 OFFICE O/P EST LOW 20 MIN: CPT | Mod: 25

## 2020-02-19 PROCEDURE — 11102 TANGNTL BX SKIN SINGLE LES: CPT | Mod: 59

## 2020-02-19 PROCEDURE — 17004 DESTROY PREMAL LESIONS 15/>: CPT

## 2020-02-19 PROCEDURE — ? LIQUID NITROGEN

## 2020-02-19 PROCEDURE — 11103 TANGNTL BX SKIN EA SEP/ADDL: CPT

## 2020-02-19 PROCEDURE — ? BIOPSY BY SHAVE METHOD

## 2020-02-19 ASSESSMENT — LOCATION DETAILED DESCRIPTION DERM
LOCATION DETAILED: LEFT PROXIMAL PRETIBIAL REGION
LOCATION DETAILED: RIGHT INFERIOR LATERAL MALAR CHEEK
LOCATION DETAILED: LEFT EYEBROW
LOCATION DETAILED: MID POSTERIOR NECK
LOCATION DETAILED: LEFT LATERAL SUPERIOR CHEST
LOCATION DETAILED: LEFT INFERIOR HELIX
LOCATION DETAILED: RIGHT RADIAL DORSAL HAND
LOCATION DETAILED: RIGHT SUPERIOR MEDIAL FOREHEAD
LOCATION DETAILED: LEFT SUPERIOR MEDIAL FOREHEAD
LOCATION DETAILED: LEFT INFERIOR MEDIAL MALAR CHEEK
LOCATION DETAILED: LEFT CENTRAL EYEBROW
LOCATION DETAILED: RIGHT DISTAL RADIAL DORSAL FOREARM
LOCATION DETAILED: LEFT INFERIOR PREAURICULAR CHEEK
LOCATION DETAILED: LEFT CENTRAL PARIETAL SCALP
LOCATION DETAILED: RIGHT ULNAR DORSAL HAND
LOCATION DETAILED: EPIGASTRIC SKIN
LOCATION DETAILED: LEFT CENTRAL MALAR CHEEK
LOCATION DETAILED: LEFT MEDIAL UPPER BACK
LOCATION DETAILED: LEFT SUPERIOR FOREHEAD
LOCATION DETAILED: RIGHT NASAL SIDEWALL
LOCATION DETAILED: RIGHT SUPERIOR CENTRAL MALAR CHEEK
LOCATION DETAILED: RIGHT MEDIAL FOREHEAD
LOCATION DETAILED: LEFT MEDIAL FRONTAL SCALP
LOCATION DETAILED: RIGHT PROXIMAL LATERAL POSTERIOR UPPER ARM
LOCATION DETAILED: LEFT SUPERIOR PARIETAL SCALP
LOCATION DETAILED: RIGHT MEDIAL ZYGOMA
LOCATION DETAILED: LEFT RIB CAGE
LOCATION DETAILED: RIGHT LATERAL SUPERIOR CHEST
LOCATION DETAILED: RIGHT NASAL ALA
LOCATION DETAILED: RIGHT INFERIOR ANTERIOR NECK
LOCATION DETAILED: LEFT LATERAL INFERIOR EYELID
LOCATION DETAILED: RIGHT INFERIOR MEDIAL FOREHEAD
LOCATION DETAILED: LEFT NASAL DORSUM
LOCATION DETAILED: LEFT INFERIOR POSTAURICULAR SKIN
LOCATION DETAILED: LEFT MEDIAL MALAR CHEEK
LOCATION DETAILED: RIGHT MEDIAL CANTHUS
LOCATION DETAILED: RIGHT DISTAL PRETIBIAL REGION

## 2020-02-19 ASSESSMENT — LOCATION SIMPLE DESCRIPTION DERM
LOCATION SIMPLE: RIGHT FOREARM
LOCATION SIMPLE: SCALP
LOCATION SIMPLE: RIGHT FOREHEAD
LOCATION SIMPLE: LEFT CHEEK
LOCATION SIMPLE: RIGHT ZYGOMA
LOCATION SIMPLE: RIGHT NOSE
LOCATION SIMPLE: RIGHT ANTERIOR NECK
LOCATION SIMPLE: CHEST
LOCATION SIMPLE: LEFT SCALP
LOCATION SIMPLE: RIGHT CHEEK
LOCATION SIMPLE: LEFT UPPER BACK
LOCATION SIMPLE: RIGHT EYELID
LOCATION SIMPLE: NOSE
LOCATION SIMPLE: LEFT EYEBROW
LOCATION SIMPLE: ABDOMEN
LOCATION SIMPLE: RIGHT HAND
LOCATION SIMPLE: LEFT PRETIBIAL REGION
LOCATION SIMPLE: LEFT EAR
LOCATION SIMPLE: POSTERIOR NECK
LOCATION SIMPLE: RIGHT PRETIBIAL REGION
LOCATION SIMPLE: LEFT FOREHEAD
LOCATION SIMPLE: RIGHT POSTERIOR UPPER ARM
LOCATION SIMPLE: LEFT INFERIOR EYELID

## 2020-02-19 ASSESSMENT — LOCATION ZONE DERM
LOCATION ZONE: SCALP
LOCATION ZONE: FACE
LOCATION ZONE: EAR
LOCATION ZONE: NECK
LOCATION ZONE: HAND
LOCATION ZONE: TRUNK
LOCATION ZONE: NOSE
LOCATION ZONE: ARM
LOCATION ZONE: EYELID
LOCATION ZONE: LEG

## 2020-02-19 NOTE — PROCEDURE: COUNSELING
Detail Level: Zone
Detail Level: Detailed
Quality 137: Melanoma: Continuity Of Care - Recall System: Patient information entered into a recall system that includes: target date for the next exam specified AND a process to follow up with patients regarding missed or unscheduled appointments
When Should The Patient Follow-Up For Their Next Full-Body Skin Exam?: 3 Months
Quality 224: Stage 0-Iic Melanoma: Overutilization Of Imaging Studies For Only Stage 0-Iic Melanoma: None of the following diagnostic imaging studies ordered: chest X-ray, CT, Ultrasound, MRI, PET, or nuclear medicine scans (ML)

## 2020-02-19 NOTE — PROCEDURE: BIOPSY BY SHAVE METHOD
Detail Level: Detailed
Depth Of Biopsy: dermis
Was A Bandage Applied: Yes
Size Of Lesion In Cm: 0.5
X Size Of Lesion In Cm: 0
Biopsy Type: H and E
Biopsy Method: Personna blade
Anesthesia Type: 1% lidocaine with epinephrine
Hemostasis: Drysol and Electrocautery
Wound Care: Vaseline
Dressing: bandage
Destruction After The Procedure: No
Type Of Destruction Used: Curettage
Curettage Text: The wound bed was treated with curettage after the biopsy was performed.
Cryotherapy Text: The wound bed was treated with cryotherapy after the biopsy was performed.
Electrodesiccation Text: The wound bed was treated with electrodesiccation after the biopsy was performed.
Electrodesiccation And Curettage Text: The wound bed was treated with electrodesiccation and curettage after the biopsy was performed.
Silver Nitrate Text: The wound bed was treated with silver nitrate after the biopsy was performed.
Lab: [id number]
Lab Facility: 
Consent: Written consent was obtained and risks were reviewed including but not limited to scarring, infection, bleeding, scabbing, incomplete removal, nerve damage and allergy to anesthesia.
Post-Care Instructions: I reviewed with the patient in detail post-care instructions. Patient is to keep the biopsy site dry overnight, and then apply vaseline twice daily until healed.
Notification Instructions: Patient will be notified of biopsy results. However, patient instructed to call the office if not contacted within 2 weeks.
Billing Type: Third-Party Bill
Size Of Lesion In Cm: 0.7

## 2020-02-19 NOTE — PROCEDURE: REASSURANCE
Quality 137: Melanoma: Continuity Of Care - Recall System: Patient information entered into a recall system that includes: target date for the next exam specified AND a process to follow up with patients regarding missed or unscheduled appointments
Detail Level: Detailed
When Should The Patient Follow-Up For Their Next Full-Body Skin Exam?: 3 Months
Quality 224: Stage 0-Iic Melanoma: Overutilization Of Imaging Studies For Only Stage 0-Iic Melanoma: None of the following diagnostic imaging studies ordered: chest X-ray, CT, Ultrasound, MRI, PET, or nuclear medicine scans (ML)

## 2020-02-19 NOTE — PROCEDURE: LIQUID NITROGEN
Render Post-Care Instructions In Note?: no
Detail Level: Detailed
Duration Of Freeze Thaw-Cycle (Seconds): 0
Consent: The patient's consent was obtained including but not limited to risks of crusting, scabbing, blistering, scarring, darker or lighter pigmentary change, recurrence, incomplete removal and infection.
Number Of Freeze-Thaw Cycles: 1 freeze-thaw cycle
Aperture Size (Optional): B
Post-Care Instructions: I reviewed with the patient in detail post-care instructions. Patient is to wear sunprotection, and avoid picking at any of the treated lesions. Pt may apply Vaseline to crusted or scabbing areas.

## 2020-02-19 NOTE — PROCEDURE: MIPS QUALITY
Quality 130: Documentation Of Current Medications In The Medical Record: Current Medications Documented
Quality 226: Preventive Care And Screening: Tobacco Use: Screening And Cessation Intervention: Patient screened for tobacco use and is an ex/non-smoker
Detail Level: Detailed
Quality 111:Pneumonia Vaccination Status For Older Adults: Pneumococcal Vaccination Previously Received
Quality 137: Melanoma: Continuity Of Care - Recall System: Patient information entered into a recall system that includes: target date for the next exam specified AND a process to follow up with patients regarding missed or unscheduled appointments

## 2020-03-03 ENCOUNTER — HOSPITAL ENCOUNTER (OUTPATIENT)
Dept: LAB | Facility: MEDICAL CENTER | Age: 85
End: 2020-03-03
Attending: SURGERY
Payer: MEDICARE

## 2020-03-03 LAB
BASOPHILS # BLD AUTO: 0.8 % (ref 0–1.8)
BASOPHILS # BLD: 0.04 K/UL (ref 0–0.12)
EOSINOPHIL # BLD AUTO: 0.09 K/UL (ref 0–0.51)
EOSINOPHIL NFR BLD: 1.7 % (ref 0–6.9)
ERYTHROCYTE [DISTWIDTH] IN BLOOD BY AUTOMATED COUNT: 48.1 FL (ref 35.9–50)
HCT VFR BLD AUTO: 44.6 % (ref 37–47)
HGB BLD-MCNC: 14.5 G/DL (ref 12–16)
IMM GRANULOCYTES # BLD AUTO: 0.01 K/UL (ref 0–0.11)
IMM GRANULOCYTES NFR BLD AUTO: 0.2 % (ref 0–0.9)
LYMPHOCYTES # BLD AUTO: 1.12 K/UL (ref 1–4.8)
LYMPHOCYTES NFR BLD: 21.4 % (ref 22–41)
MCH RBC QN AUTO: 30.6 PG (ref 27–33)
MCHC RBC AUTO-ENTMCNC: 32.5 G/DL (ref 33.6–35)
MCV RBC AUTO: 94.1 FL (ref 81.4–97.8)
MONOCYTES # BLD AUTO: 0.45 K/UL (ref 0–0.85)
MONOCYTES NFR BLD AUTO: 8.6 % (ref 0–13.4)
NEUTROPHILS # BLD AUTO: 3.53 K/UL (ref 2–7.15)
NEUTROPHILS NFR BLD: 67.3 % (ref 44–72)
NRBC # BLD AUTO: 0 K/UL
NRBC BLD-RTO: 0 /100 WBC
PLATELET # BLD AUTO: 161 K/UL (ref 164–446)
PMV BLD AUTO: 11.7 FL (ref 9–12.9)
RBC # BLD AUTO: 4.74 M/UL (ref 4.2–5.4)
WBC # BLD AUTO: 5.2 K/UL (ref 4.8–10.8)

## 2020-03-03 PROCEDURE — 36415 COLL VENOUS BLD VENIPUNCTURE: CPT

## 2020-03-03 PROCEDURE — 85025 COMPLETE CBC W/AUTO DIFF WBC: CPT

## 2020-03-05 ENCOUNTER — OFFICE VISIT (OUTPATIENT)
Dept: RHEUMATOLOGY | Facility: MEDICAL CENTER | Age: 85
End: 2020-03-05
Payer: MEDICARE

## 2020-03-05 VITALS
OXYGEN SATURATION: 95 % | SYSTOLIC BLOOD PRESSURE: 130 MMHG | HEART RATE: 64 BPM | BODY MASS INDEX: 30.82 KG/M2 | DIASTOLIC BLOOD PRESSURE: 80 MMHG | RESPIRATION RATE: 14 BRPM | WEIGHT: 174 LBS | TEMPERATURE: 98.2 F

## 2020-03-05 DIAGNOSIS — Z79.01 CHRONIC ANTICOAGULATION: ICD-10-CM

## 2020-03-05 DIAGNOSIS — C18.9 ADENOCARCINOMA OF COLON (HCC): ICD-10-CM

## 2020-03-05 DIAGNOSIS — Z79.899 LONG-TERM USE OF PLAQUENIL: ICD-10-CM

## 2020-03-05 DIAGNOSIS — I10 ESSENTIAL HYPERTENSION, BENIGN: ICD-10-CM

## 2020-03-05 DIAGNOSIS — C43.9 MALIGNANT MELANOMA, UNSPECIFIED SITE (HCC): ICD-10-CM

## 2020-03-05 DIAGNOSIS — D68.59 PROTEIN S DEFICIENCY (HCC): ICD-10-CM

## 2020-03-05 DIAGNOSIS — M81.0 OSTEOPOROSIS WITHOUT CURRENT PATHOLOGICAL FRACTURE, UNSPECIFIED OSTEOPOROSIS TYPE: ICD-10-CM

## 2020-03-05 DIAGNOSIS — M05.79 RHEUMATOID ARTHRITIS INVOLVING MULTIPLE SITES WITH POSITIVE RHEUMATOID FACTOR (HCC): ICD-10-CM

## 2020-03-05 PROCEDURE — 99213 OFFICE O/P EST LOW 20 MIN: CPT | Performed by: INTERNAL MEDICINE

## 2020-03-05 RX ORDER — NYSTATIN 100000 [USP'U]/G
POWDER TOPICAL 4 TIMES DAILY
COMMUNITY
End: 2020-03-16

## 2020-03-05 RX ORDER — METHYLPREDNISOLONE ACETATE 40 MG/ML
40 INJECTION, SUSPENSION INTRA-ARTICULAR; INTRALESIONAL; INTRAMUSCULAR; SOFT TISSUE ONCE
Status: COMPLETED | OUTPATIENT
Start: 2020-03-05 | End: 2020-03-05

## 2020-03-05 RX ADMIN — METHYLPREDNISOLONE ACETATE 40 MG: 40 INJECTION, SUSPENSION INTRA-ARTICULAR; INTRALESIONAL; INTRAMUSCULAR; SOFT TISSUE at 15:51

## 2020-03-05 ASSESSMENT — FIBROSIS 4 INDEX: FIB4 SCORE: 2.68

## 2020-03-06 NOTE — PROGRESS NOTES
Chief Complaint- joint pain     Subjective:   Marry Mathur is a 84 y.o. female here today for follow up of rheumatological issues    This is a follow-up visit for this patient who is seen in this clinic for osteoarthritis.  There is a remote history of rheumatoid arthritis in the past with negative serologies and negative x-rays.  Patient is currently on chronic anticoagulation so we treat patient's osteoarthritis with Plaquenil at 200 mg p.o. twice daily as patient is not able to take NSAIDs.  Patient is here stating she is having a flare of her osteoarthritis especially in her hands, correlates with weather changes, patient also under a bit more stress because a melanoma was found on the shin of her left leg and is scheduled for removal in about 4 days.  Patient's worry about getting a corticosteroid injection today.  Patient states she has an upcoming ophthalmology evaluation due March 2020.    Other issues include the development of leg length discrepancy status post left CARLYLE with left leg longer than the right.  Patient just recently got shoes to help adjust her leg length.      Other issues include a finding of osteoporosis on patient's bone density scan from March 2018, patient was not able to tolerate Fosamax because of a history of Araya's esophagus, patient currently on Prolia 60 mg subcu every 6 months with benefit.        Additional comorbidities include diabetes for which patient  takes metformin, also with a diagnosis of spinal stenosis with intermittent weakness in her legs with progressive numbness and weakness in the left leg.  Patient also with protein S deficiency with a history of DVT on chronic anticoagulation.       Left TKA   Left CARLYLE         S/p Remicade-stopped because of hx of melanoma about 1996 and has multiple other skin cancers  S/p Orencia-lost efficacy  S/p Humira-stopped because of recurrence of melanoma October 2017  S/p MTX-stopped because of development of skin  cancer/melanoma October 2017  S/p NSAIDS-relatively contraindicated as patient is on chronic anticoagulation   S/p xeljanz-stopped because of the development of colonic adenocarcinoma  S/p fosamax-unable to tolerate-GI upset      G6PD 13.6 adequate 5/2019  DEXA 3/18/2016 T scores 1.1, -1.1   FRAX 3/18/2016 major osteoporotic fracture risk 14.3%, hip fracture risks 3.7%  DEXA 3/23/2018 T scores 0.2, -1.4  FRAX 3/23/2018 major osteoporotic fracture risk 19.3%, hip fracture risk 6.1%  Prolia started 6/2019  Echocardiogram 7/2012 [facility]  RF neg 3/2014 [facility]; RF neg 6/2014 [facility]; RF neg 6/2016  CCP neg 3/2014 [facility]; CCP neg 6/2014 [facility]; CCP neg 6/2016  Uric acid 4.7 3/2014 [facility];Uric acid 4.5 6/2016  Hep B neg 6/2016  Quantiferon Gold neg 6/2014 [facility]; Quantiferon Gold neg 6/2016  Hand x-rays 3/2016-indicate osteoarthritis  Feet x-rays 3/2016-indicate osteoarthritis     Corticosteroid Therapy Informed Consent signed 1/17/2019-copy given to patient      Addendum 6/3/2020  DEXA 6/2/2020 T scores 0.7, -1.4  FRAX 6/2/2020 major osteoporotic fracture risk 17.2%, hip fracture risk 4.9%          Current medicines (including changes today)  Current Outpatient Medications   Medication Sig Dispense Refill   • nystatin (NYSTOP) powder Apply  to affected area(s) 4 times a day.     • atorvastatin (LIPITOR) 10 MG Tab Take 1 Tab by mouth every evening. 90 Tab 3   • lidocaine (LIDODERM) 5 % Patch Apply 1 Patch to skin as directed every 24 hours. 90 Patch 0   • rivaroxaban (XARELTO) 20 MG Tab tablet Take 1 Tab by mouth with dinner. 90 Tab 1   • hydroxychloroquine (PLAQUENIL) 200 MG Tab 1 tab po bid 180 Tab 0   • LYRICA 100 MG Cap Take 100 mg by mouth 2 times a day.     • omeprazole (PRILOSEC) 20 MG delayed-release capsule Take 20 mg by mouth every day.     • Diphenhydramine-APAP, sleep, (TYLENOL PM EXTRA STRENGTH)  MG Tab Take 2 Tabs by mouth every bedtime.     • acetaminophen (TYLENOL)  500 MG Tab Take 1,000 mg by mouth 2 Times a Day.     • Melatonin 10 MG Tab Take 1 Tab by mouth every bedtime.     • vitamin D (CHOLECALCIFEROL) 1000 UNIT Tab Take 1,000 Units by mouth every morning.     • POTASSIUM GLUCONATE Take 1 Tab by mouth every morning. Pt unsure of dose     • Calcium Carbonate-Vitamin D (CALCIUM + D PO) Take 1 Tab by mouth every bedtime.     • methylPREDNISolone (MEDROL) 4 MG Tab 6 tabs po one day then 5 tabs po one day then 4 tabs po one day then 3 tabs po one day then 2 tabs po one day then 1 tab po for one day (Patient not taking: Reported on 3/5/2020) 21 Tab 0     Current Facility-Administered Medications   Medication Dose Route Frequency Provider Last Rate Last Dose   • denosumab (PROLIA) subq injection 60 mg  60 mg Subcutaneous Q6 MO Deanna Escoto M.D.   60 mg at 12/09/19 1058     She  has a past medical history of Adenocarcinoma of colon ([facility]) (10/30/2018), Anesthesia, Atrial fibrillation [I48.91] (4/19/2016), Back pain (6/27/2012), Blood clotting disorder ([facility]) (2012), Bowel habit changes, Cancer ([facility]), Cataract, Chronic back pain greater than 3 months duration, Deep vein thrombosis ([facility]) (3/8/2016), Essential hypertension, benign (6/27/2012), GERD (gastroesophageal reflux disease) (6/27/2012), Heart murmur, Hyperlipidemia, Hypertension, Impaired fasting glucose (11/2/2017), Mild aortic stenosis (11/2/2017), Other and unspecified hyperlipidemia (6/27/2012), Prediabetes, Protein S deficiency ([facility]) (11/2/2017), Rheumatoid arthritis involving multiple sites with positive rheumatoid factor ([facility]) (03/14/2016), Rheumatoid nodule ([facility]) (7/25/2017), Right bundle branch block (6/27/2012), and Urinary incontinence (11/2/2017).    ROS   Other than what is mentioned in HPI or physical exam, there is no history of headaches, double vision or blurred vision. No temporal tenderness or jaw claudication. No trouble swallowing difficulties or sore throats.  No chest complaints including chest  pain, cough or sputum production. No GI complaints including nausea, vomiting, change in bowel habits, or past peptic ulcer disease. No history of blood in the stools. No urinary complaints including dysuria or frequency. No history of alopecia, photosensitivity, oral ulcerations, Raynaud's phenomena.       Objective:     /80   Pulse 64   Temp 36.8 °C (98.2 °F) (Temporal)   Resp 14   Wt 78.9 kg (174 lb)   SpO2 95%  Body mass index is 30.82 kg/m².   Physical Exam:    Constitutional: Alert and oriented X3, patient is talkative with good eye contact.Skin: Warm, dry, good turgor, no rashes in visible areas.Eye: Equal, round and reactive, conjunctiva clear, lids normal EOM intactENMT: Lips without lesions, good dentition, no oropharyngeal ulcers, moist buccal mucosa, pinna without deformityNeck: Trachea midline, no masses, no thyromegaly.Lymph:  No cervical lymphadenopathy, no axillary lymphadenopathy, no supraclavicular lymphadenopathyRespiratory: Unlabored respiratory effort, lungs clear to auscultation, no wheezes, no ronchi.Cardiovascular: Normal S1, S2, no murmur, no edema.Abdomen: Soft, non-tender, no masses, no hepatosplenomegaly.Psych: Alert and oriented x3, normal affect and mood.Neuro: Cranial nerves 2-12 are grossly intact, no loss of sensation LEExt:no joint laxity noted in bilateral arms, no joint laxity noted in bilateral legs, patient is developing some Dupuytren's contractures in the palmar aspect of her right hand, also evidence of Heberden's nodes on most DIP joints of both hands, there is a well-healed left TKA incision site, Ana's nodes bilateral fifth PIP joints, no evidence of swan-neck or boutonniere deformities, toes without crossover toes and without splay toes    Lab Results   Component Value Date/Time    QNTTBGOLD Negative 06/29/2016 02:03 PM     Lab Results   Component Value Date/Time    HEPBCORIGM Negative 06/29/2016 02:03 PM    HEPBSAG Negative 06/29/2016 02:03 PM     Lab  Results   Component Value Date/Time    SODIUM 143 11/26/2019 08:23 AM    POTASSIUM 4.1 11/26/2019 08:23 AM    CHLORIDE 107 11/26/2019 08:23 AM    CO2 29 11/26/2019 08:23 AM    GLUCOSE 81 11/26/2019 08:23 AM    BUN 26 (H) 11/26/2019 08:23 AM    CREATININE 0.78 11/26/2019 08:23 AM      Lab Results   Component Value Date/Time    WBC 5.2 03/03/2020 12:42 PM    RBC 4.74 03/03/2020 12:42 PM    HEMOGLOBIN 14.5 03/03/2020 12:42 PM    HEMATOCRIT 44.6 03/03/2020 12:42 PM    MCV 94.1 03/03/2020 12:42 PM    MCH 30.6 03/03/2020 12:42 PM    MCHC 32.5 (L) 03/03/2020 12:42 PM    MPV 11.7 03/03/2020 12:42 PM    NEUTSPOLYS 67.30 03/03/2020 12:42 PM    LYMPHOCYTES 21.40 (L) 03/03/2020 12:42 PM    MONOCYTES 8.60 03/03/2020 12:42 PM    EOSINOPHILS 1.70 03/03/2020 12:42 PM    BASOPHILS 0.80 03/03/2020 12:42 PM      Lab Results   Component Value Date/Time    CALCIUM 8.8 11/26/2019 08:23 AM    ASTSGOT 18 11/26/2019 08:23 AM    ALTSGPT 12 11/26/2019 08:23 AM    ALKPHOSPHAT 45 11/26/2019 08:23 AM    TBILIRUBIN 0.7 11/26/2019 08:23 AM    ALBUMIN 4.3 11/26/2019 08:23 AM    TOTPROTEIN 6.6 11/26/2019 08:23 AM     Lab Results   Component Value Date/Time    URICACID 4.5 06/29/2016 02:03 PM    RHEUMFACTN <10 06/29/2016 02:03 PM    CCPANTIBODY 4 06/29/2016 02:03 PM     Lab Results   Component Value Date/Time    SEDRATEWES 11 07/07/2018 11:25 AM     Lab Results   Component Value Date/Time    HBA1C 5.5 05/16/2016 07:50 AM     Lab Results   Component Value Date/Time    G6PD 13.6 05/20/2019 10:01 AM     Lab Results   Component Value Date/Time    CPKTOTAL 61 06/29/2016 02:03 PM     Results for orders placed during the hospital encounter of 03/18/16   DX-JOINT SURVEY-HANDS SINGLE VIEW    Impression Multiple bilateral sites of osteoarthritis     Results for orders placed during the hospital encounter of 03/18/16   DX-JOINT SURVEY-FEET SINGLE VIEW    Impression 1.  Bilateral midfoot and forefoot osteoarthritis    2.  Bilateral bunion    3.  Prior fusion  across the right 2nd proximal interphalangeal joint    4.  Foreign body or opaque material between 1st and 2nd phalanges     Results for orders placed during the hospital encounter of 03/23/18   DS-BONE DENSITY STUDY (DEXA)    Impression According to the World Health Organization classification, bone mineral density of this patient is osteopenia with increased risk of fracture. Percentage decrease in bone mineral density in the lumbar region is statistically significant.        10-year Probability of Fracture:  Major Osteoporotic     19.3%  Hip     6.1%  Population      [place] ()    Based on left femur neck BMD          INTERPRETING LOCATION:  [address] [place] [address], [id number]     Results for orders placed during the hospital encounter of 01/14/09   DX-KNEE COMPLETE 4+    Impression IMPRESSION:     MILD PATELLOFEMORAL AND MEDIAL FEMOROTIBIAL COMPARTMENT DEGENERATIVE   OSTEOARTHROSIS.        GEK:dana     Read By ALEX WISE MD on Jan 14 2009 10:01AM  : DANA Transcription Date: Jovi 15 2009  8:11AM  THIS DOCUMENT HAS BEEN ELECTRONICALLY SIGNED BY: ALEX WISE MD on   Jan 16 2009  1:32PM        Results for orders placed during the hospital encounter of 01/14/09   DX-SHOULDER 2+    Impression IMPRESSION:     NORMAL RADIOGRAPHS OF THE LEFT SHOULDER WITH NOTE MADE OF MINIMAL   DEGENERATIVE OSTEOARTHROSIS OF THE GLENOHUMERAL JOINT WITH MINIMAL   SPURRING.           Results for orders placed during the hospital encounter of 08/18/18   MR-LUMBAR SPINE-W/O    Impression Mild interval worsening L3/4 right paracentral protrusion results in mild worsening lateral recess stenosis    Otherwise stable multilevel degenerative change resulting in foraminal predominate stenoses, greatest is moderate to severe on the right at L3/4.    Lesser stenoses at other levels as detailed above    Stable L4/5 grade 1 anterolisthesis of secondary to severe facet arthropathy. Stable minimal degenerative retrolisthesis  of the upper lumbar levels    Mature L5/S1 interbody fusion        Results for orders placed during the hospital encounter of 10/08/18   MR-KNEE-W/O LEFT    Impression Status post medial knee hemiarthroplasty without complication identified    Mild patellofemoral and lateral femorotibial osteoarthritis with some cartilage thinning and spurring but no full-thickness defects or areas of marrow edema are identified     Results for orders placed during the hospital encounter of 01/14/09   DX-CERVICAL SPINE-2 OR 3 VIEWS    Impression IMPRESSION:     DEGENERATIVE DISC AND FACET ARTHROPATHY C5-6, C6-7, AND DEGENERATIVE   FACET ARTHROPATHY AT C7-T1.           Assessment and Plan:     1. Rheumatoid arthritis involving multiple sites with positive rheumatoid factor (HCC)  Patient symptoms, serologies and x-rays more compatible with osteoarthritis, continue off of DMARDs of Biologics for now, continue Plaquenil 200 mg p.o. twice daily, as noted above patient unable to take NSAIDs because of chronic anticoagulation  Patient having a bit of a flare because of a number of stresses today we will do an intramuscular methylprednisolone injection of 40 mg.  Corticosteroid Therapy Informed Consent signed 1/17/2019-copy given to patient    - methylPREDNISolone acetate (DEPO-MEDROL) injection 40 mg  - DS-BONE DENSITY STUDY (DEXA); Future    2. Long-term use of Plaquenil  On Plaquenil 200 mg p.o. twice daily  Patient has an upcoming ophthalmology evaluation March 2020.  Of note G6PD levels are adequate  Patient needs monitoring labs every 6 months next labs will be due about May 2020.  - methylPREDNISolone acetate (DEPO-MEDROL) injection 40 mg  - DS-BONE DENSITY STUDY (DEXA); Future    3. Osteoporosis without current pathological fracture, unspecified osteoporosis type  Last DEXA March 2018 patient due for DEXA DEXA ordered for patient  Patient currently on Prolia 60 mg subcu every 6 months   continue calcium citrate 1200 mg by mouth  daily and vitamin D about 2000 units by mouth daily and magnesium 200 mg by mouth daily  - DS-BONE DENSITY STUDY (DEXA); Future    4. Adenocarcinoma of colon (HCC)  Status post resection now stable with good management    5. Malignant melanoma, unspecified site (HCC)  Just recently found on the anterior surface of the left shin scheduled for removal in about 4 days.    6. Chronic anticoagulation  This will impact with kind of medications we can use for this patient's arthritis, NSAIDs are contraindicated because of increased risk of bleeding while on chronic anticoagulation    7. Protein S deficiency (HCC)  On chronic anticoagulation  This will impact with kind of medications we can use for this patient's arthritis, NSAIDs are contraindicated because of increased risk of bleeding while on chronic anticoagulation    8. Essential hypertension, benign  May impact the type of medications we can use for this patient's arthritis. We will have to keep this under advisement.    Followup: Return in about 3 months (around 6/5/2020). or sooner jose eduardo Mathur  was seen 30 minutes face-to-face of which more than 50% of the time was spent counseling the patient (excluding time for procedures)  regarding  rheumatological condition and care. Therapy was discussed in detail.      Please note that this dictation was created using voice recognition software. I have made every reasonable attempt to correct obvious errors, but I expect that there are errors of grammar and possibly content that I did not discover before finalizing the note.

## 2020-03-09 ENCOUNTER — APPOINTMENT (RX ONLY)
Dept: URBAN - METROPOLITAN AREA CLINIC 4 | Facility: CLINIC | Age: 85
Setting detail: DERMATOLOGY
End: 2020-03-09

## 2020-03-09 PROBLEM — D03.72 MELANOMA IN SITU OF LEFT LOWER LIMB, INCLUDING HIP: Status: ACTIVE | Noted: 2020-03-09

## 2020-03-09 PROCEDURE — ? EXCISION

## 2020-03-09 PROCEDURE — ? COUNSELING

## 2020-03-09 PROCEDURE — 11606 EXC TR-EXT MAL+MARG >4 CM: CPT

## 2020-03-09 PROCEDURE — 13122 CMPLX RPR S/A/L ADDL 5 CM/>: CPT

## 2020-03-09 PROCEDURE — 13121 CMPLX RPR S/A/L 2.6-7.5 CM: CPT

## 2020-03-09 NOTE — PROCEDURE: EXCISION
Referring Physician (Optional): Schoening, PAC
Surgeon (Optional): Ayesha
Biopsy Photograph Reviewed: Yes
Previous Accession (Optional): [id number]
Breslow Depth: MM in situ...
Size Of Lesion In Cm: 3.2
X Size Of Lesion In Cm (Optional): 0
Size Of Margin In Cm: 0.5
Excision Method: Elliptical
Anesthesia Volume In Cc: 12
Did You Provide Opioid Counseling: No
Repair Type: Complex
Intermediate / Complex Repair - Final Wound Length In Cm: 8.5
Width Of Defect Perpendicular To Closure In Cm (Required): 2
Distance Of Undermining In Cm (Required): 2.3
Undermining Type: Entire Wound
Debridement Text: The wound edges were debrided prior to proceeding with the closure to facilitate wound healing.
Helical Rim Text: The closure involved the helical rim.
Vermilion Border Text: The closure involved the vermilion border.
Nostril Rim Text: The closure involved the nostril rim.
Retention Suture Text: Retention sutures were placed to support the closure and prevent dehiscence.
Lab: [id number]
Lab Facility: 
Graft Donor Site Bandage (Optional-Leave Blank If You Don't Want In Note): Steri-strips and a pressure bandage were applied to the donor site.
Epidermal Closure Graft Donor Site (Optional): simple interrupted
Billing Type: Third-Party Bill
Excision Depth: adipose tissue
Scalpel Size: 15 blade
Anesthesia Type: 1% lidocaine with epinephrine
Additional Anesthesia Volume In Cc: 6
Hemostasis: Electrocautery
Estimated Blood Loss (Cc): minimal
Detail Level: Detailed
Repair Anesthesia Method: local infiltration
Undermining Location (Optional): in the superficial subcutaneous fat
Deep Sutures: 2-0 Vicryl
Additional Deep Sutures: 4-0 Vicryl
Dermal Closure: buried vertical mattress
Epidermal Sutures: 5-0 Vicryl Rapide
Additional Epidermal Sutures: 5-0 Caprosyn
Epidermal Closure: running locked
Wound Care: Bacitracin
Dressing: dry sterile dressing
Positioning (Leave Blank If You Do Not Want): The patient was placed in a comfortable position exposing the surgical site.
Pre-Excision Curettage Text (Leave Blank If You Do Not Want): Prior to drawing the surgical margin the visible lesion was removed with electrodesiccation and curettage to clearly define the lesion size.
Complex Repair Preamble Text (Leave Blank If You Do Not Want): Extensive wide undermining was performed.
Intermediate Repair Preamble Text (Leave Blank If You Do Not Want): Undermining was performed with blunt dissection.
Curvilinear Excision Additional Text (Leave Blank If You Do Not Want): The margin was drawn around the clinically apparent lesion.  A curvilinear shape was then drawn on the skin incorporating the lesion and margins.  Incisions were then made along these lines to the appropriate tissue plane and the lesion was extirpated.
Fusiform Excision Additional Text (Leave Blank If You Do Not Want): The margin was drawn around the clinically apparent lesion.  A fusiform shape was then drawn on the skin incorporating the lesion and margins.  Incisions were then made along these lines to the appropriate tissue plane and the lesion was extirpated.
Elliptical Excision Additional Text (Leave Blank If You Do Not Want): The margin was drawn around the clinically apparent lesion.  An elliptical shape was then drawn on the skin incorporating the lesion and margins.  Incisions were then made along these lines to the appropriate tissue plane and the lesion was extirpated.
Saucerization Excision Additional Text (Leave Blank If You Do Not Want): The margin was drawn around the clinically apparent lesion.  Incisions were then made along these lines, in a tangential fashion, to the appropriate tissue plane and the lesion was extirpated.
Slit Excision Additional Text (Leave Blank If You Do Not Want): A linear line was drawn on the skin overlying the lesion. An incision was made slowly until the lesion was visualized.  Once visualized, the lesion was removed with blunt dissection.
Excisional Biopsy Additional Text (Leave Blank If You Do Not Want): The margin was drawn around the clinically apparent lesion. An elliptical shape was then drawn on the skin incorporating the lesion and margins.  Incisions were then made along these lines to the appropriate tissue plane and the lesion was extirpated.
Perilesional Excision Additional Text (Leave Blank If You Do Not Want): The margin was drawn around the clinically apparent lesion. Incisions were then made along these lines to the appropriate tissue plane and the lesion was extirpated.
Repair Performed By Another Provider Text (Leave Blank If You Do Not Want): After the tissue was excised the defect was repaired by another provider.
No Repair - Repaired With Adjacent Surgical Defect Text (Leave Blank If You Do Not Want): After the excision the defect was repaired concurrently with another surgical defect which was in close approximation.
Advancement Flap (Single) Text: The defect edges were debeveled with a #15 scalpel blade.  Given the location of the defect and the proximity to free margins a single advancement flap was deemed most appropriate.  Using a sterile surgical marker, an appropriate advancement flap was drawn incorporating the defect and placing the expected incisions within the relaxed skin tension lines where possible.    The area thus outlined was incised deep to adipose tissue with a #15 scalpel blade.  The skin margins were undermined to an appropriate distance in all directions utilizing iris scissors.
Advancement Flap (Double) Text: The defect edges were debeveled with a #15 scalpel blade.  Given the location of the defect and the proximity to free margins a double advancement flap was deemed most appropriate.  Using a sterile surgical marker, the appropriate advancement flaps were drawn incorporating the defect and placing the expected incisions within the relaxed skin tension lines where possible.    The area thus outlined was incised deep to adipose tissue with a #15 scalpel blade.  The skin margins were undermined to an appropriate distance in all directions utilizing iris scissors.
Burow's Advancement Flap Text: The defect edges were debeveled with a #15 scalpel blade.  Given the location of the defect and the proximity to free margins a Burow's advancement flap was deemed most appropriate.  Using a sterile surgical marker, the appropriate advancement flap was drawn incorporating the defect and placing the expected incisions within the relaxed skin tension lines where possible.    The area thus outlined was incised deep to adipose tissue with a #15 scalpel blade.  The skin margins were undermined to an appropriate distance in all directions utilizing iris scissors.
Chonodrocutaneous Helical Advancement Flap Text: The defect edges were debeveled with a #15 scalpel blade.  Given the location of the defect and the proximity to free margins a chondrocutaneous helical advancement flap was deemed most appropriate.  Using a sterile surgical marker, the appropriate advancement flap was drawn incorporating the defect and placing the expected incisions within the relaxed skin tension lines where possible.    The area thus outlined was incised deep to adipose tissue with a #15 scalpel blade.  The skin margins were undermined to an appropriate distance in all directions utilizing iris scissors.
Crescentic Advancement Flap Text: The defect edges were debeveled with a #15 scalpel blade.  Given the location of the defect and the proximity to free margins a crescentic advancement flap was deemed most appropriate.  Using a sterile surgical marker, the appropriate advancement flap was drawn incorporating the defect and placing the expected incisions within the relaxed skin tension lines where possible.    The area thus outlined was incised deep to adipose tissue with a #15 scalpel blade.  The skin margins were undermined to an appropriate distance in all directions utilizing iris scissors.
A-T Advancement Flap Text: The defect edges were debeveled with a #15 scalpel blade.  Given the location of the defect, shape of the defect and the proximity to free margins an A-T advancement flap was deemed most appropriate.  Using a sterile surgical marker, an appropriate advancement flap was drawn incorporating the defect and placing the expected incisions within the relaxed skin tension lines where possible.    The area thus outlined was incised deep to adipose tissue with a #15 scalpel blade.  The skin margins were undermined to an appropriate distance in all directions utilizing iris scissors.
O-T Advancement Flap Text: The defect edges were debeveled with a #15 scalpel blade.  Given the location of the defect, shape of the defect and the proximity to free margins an O-T advancement flap was deemed most appropriate.  Using a sterile surgical marker, an appropriate advancement flap was drawn incorporating the defect and placing the expected incisions within the relaxed skin tension lines where possible.    The area thus outlined was incised deep to adipose tissue with a #15 scalpel blade.  The skin margins were undermined to an appropriate distance in all directions utilizing iris scissors.
O-L Flap Text: The defect edges were debeveled with a #15 scalpel blade.  Given the location of the defect, shape of the defect and the proximity to free margins an O-L flap was deemed most appropriate.  Using a sterile surgical marker, an appropriate advancement flap was drawn incorporating the defect and placing the expected incisions within the relaxed skin tension lines where possible.    The area thus outlined was incised deep to adipose tissue with a #15 scalpel blade.  The skin margins were undermined to an appropriate distance in all directions utilizing iris scissors.
O-Z Flap Text: The defect edges were debeveled with a #15 scalpel blade.  Given the location of the defect, shape of the defect and the proximity to free margins an O-Z flap was deemed most appropriate.  Using a sterile surgical marker, an appropriate transposition flap was drawn incorporating the defect and placing the expected incisions within the relaxed skin tension lines where possible. The area thus outlined was incised deep to adipose tissue with a #15 scalpel blade.  The skin margins were undermined to an appropriate distance in all directions utilizing iris scissors.
Double O-Z Flap Text: The defect edges were debeveled with a #15 scalpel blade.  Given the location of the defect, shape of the defect and the proximity to free margins a Double O-Z flap was deemed most appropriate.  Using a sterile surgical marker, an appropriate transposition flap was drawn incorporating the defect and placing the expected incisions within the relaxed skin tension lines where possible. The area thus outlined was incised deep to adipose tissue with a #15 scalpel blade.  The skin margins were undermined to an appropriate distance in all directions utilizing iris scissors.
V-Y Flap Text: The defect edges were debeveled with a #15 scalpel blade.  Given the location of the defect, shape of the defect and the proximity to free margins a V-Y flap was deemed most appropriate.  Using a sterile surgical marker, an appropriate advancement flap was drawn incorporating the defect and placing the expected incisions within the relaxed skin tension lines where possible.    The area thus outlined was incised deep to adipose tissue with a #15 scalpel blade.  The skin margins were undermined to an appropriate distance in all directions utilizing iris scissors.
Advancement-Rotation Flap Text: The defect edges were debeveled with a #15 scalpel blade.  Given the location of the defect, shape of the defect and the proximity to free margins an advancement-rotation flap was deemed most appropriate.  Using a sterile surgical marker, an appropriate flap was drawn incorporating the defect and placing the expected incisions within the relaxed skin tension lines where possible. The area thus outlined was incised deep to adipose tissue with a #15 scalpel blade.  The skin margins were undermined to an appropriate distance in all directions utilizing iris scissors.
Mercedes Flap Text: The defect edges were debeveled with a #15 scalpel blade.  Given the location of the defect, shape of the defect and the proximity to free margins a Mercedes flap was deemed most appropriate.  Using a sterile surgical marker, an appropriate advancement flap was drawn incorporating the defect and placing the expected incisions within the relaxed skin tension lines where possible. The area thus outlined was incised deep to adipose tissue with a #15 scalpel blade.  The skin margins were undermined to an appropriate distance in all directions utilizing iris scissors.
Modified Advancement Flap Text: The defect edges were debeveled with a #15 scalpel blade.  Given the location of the defect, shape of the defect and the proximity to free margins a modified advancement flap was deemed most appropriate.  Using a sterile surgical marker, an appropriate advancement flap was drawn incorporating the defect and placing the expected incisions within the relaxed skin tension lines where possible.    The area thus outlined was incised deep to adipose tissue with a #15 scalpel blade.  The skin margins were undermined to an appropriate distance in all directions utilizing iris scissors.
Mucosal Advancement Flap Text: Given the location of the defect, shape of the defect and the proximity to free margins a mucosal advancement flap was deemed most appropriate. Incisions were made with a 15 blade scalpel in the appropriate fashion along the cutaneous vermilion border and the mucosal lip. The remaining actinically damaged mucosal tissue was excised.  The mucosal advancement flap was then elevated to the gingival sulcus with care taken to preserve the neurovascular structures and advanced into the primary defect. Care was taken to ensure that precise realignment of the vermilion border was achieved.
Hatchet Flap Text: The defect edges were debeveled with a #15 scalpel blade.  Given the location of the defect, shape of the defect and the proximity to free margins a hatchet flap was deemed most appropriate.  Using a sterile surgical marker, an appropriate hatchet flap was drawn incorporating the defect and placing the expected incisions within the relaxed skin tension lines where possible.    The area thus outlined was incised deep to adipose tissue with a #15 scalpel blade.  The skin margins were undermined to an appropriate distance in all directions utilizing iris scissors.
Rotation Flap Text: The defect edges were debeveled with a #15 scalpel blade.  Given the location of the defect, shape of the defect and the proximity to free margins a rotation flap was deemed most appropriate.  Using a sterile surgical marker, an appropriate rotation flap was drawn incorporating the defect and placing the expected incisions within the relaxed skin tension lines where possible.    The area thus outlined was incised deep to adipose tissue with a #15 scalpel blade.  The skin margins were undermined to an appropriate distance in all directions utilizing iris scissors.
Spiral Flap Text: The defect edges were debeveled with a #15 scalpel blade.  Given the location of the defect, shape of the defect and the proximity to free margins a spiral flap was deemed most appropriate.  Using a sterile surgical marker, an appropriate rotation flap was drawn incorporating the defect and placing the expected incisions within the relaxed skin tension lines where possible. The area thus outlined was incised deep to adipose tissue with a #15 scalpel blade.  The skin margins were undermined to an appropriate distance in all directions utilizing iris scissors.
Star Wedge Flap Text: The defect edges were debeveled with a #15 scalpel blade.  Given the location of the defect, shape of the defect and the proximity to free margins a star wedge flap was deemed most appropriate.  Using a sterile surgical marker, an appropriate rotation flap was drawn incorporating the defect and placing the expected incisions within the relaxed skin tension lines where possible. The area thus outlined was incised deep to adipose tissue with a #15 scalpel blade.  The skin margins were undermined to an appropriate distance in all directions utilizing iris scissors.
Transposition Flap Text: The defect edges were debeveled with a #15 scalpel blade.  Given the location of the defect and the proximity to free margins a transposition flap was deemed most appropriate.  Using a sterile surgical marker, an appropriate transposition flap was drawn incorporating the defect.    The area thus outlined was incised deep to adipose tissue with a #15 scalpel blade.  The skin margins were undermined to an appropriate distance in all directions utilizing iris scissors.
Muscle Hinge Flap Text: The defect edges were debeveled with a #15 scalpel blade.  Given the size, depth and location of the defect and the proximity to free margins a muscle hinge flap was deemed most appropriate.  Using a sterile surgical marker, an appropriate hinge flap was drawn incorporating the defect. The area thus outlined was incised with a #15 scalpel blade.  The skin margins were undermined to an appropriate distance in all directions utilizing iris scissors.
Melolabial Transposition Flap Text: The defect edges were debeveled with a #15 scalpel blade.  Given the location of the defect and the proximity to free margins a melolabial flap was deemed most appropriate.  Using a sterile surgical marker, an appropriate melolabial transposition flap was drawn incorporating the defect.    The area thus outlined was incised deep to adipose tissue with a #15 scalpel blade.  The skin margins were undermined to an appropriate distance in all directions utilizing iris scissors.
Rhombic Flap Text: The defect edges were debeveled with a #15 scalpel blade.  Given the location of the defect and the proximity to free margins a rhombic flap was deemed most appropriate.  Using a sterile surgical marker, an appropriate rhombic flap was drawn incorporating the defect.    The area thus outlined was incised deep to adipose tissue with a #15 scalpel blade.  The skin margins were undermined to an appropriate distance in all directions utilizing iris scissors.
Rhomboid Transposition Flap Text: The defect edges were debeveled with a #15 scalpel blade.  Given the location of the defect and the proximity to free margins a rhomboid transposition flap was deemed most appropriate.  Using a sterile surgical marker, an appropriate rhomboid flap was drawn incorporating the defect.    The area thus outlined was incised deep to adipose tissue with a #15 scalpel blade.  The skin margins were undermined to an appropriate distance in all directions utilizing iris scissors.
Bi-Rhombic Flap Text: The defect edges were debeveled with a #15 scalpel blade.  Given the location of the defect and the proximity to free margins a bi-rhombic flap was deemed most appropriate.  Using a sterile surgical marker, an appropriate rhombic flap was drawn incorporating the defect. The area thus outlined was incised deep to adipose tissue with a #15 scalpel blade.  The skin margins were undermined to an appropriate distance in all directions utilizing iris scissors.
Helical Rim Advancement Flap Text: The defect edges were debeveled with a #15 blade scalpel.  Given the location of the defect and the proximity to free margins (helical rim) a double helical rim advancement flap was deemed most appropriate.  Using a sterile surgical marker, the appropriate advancement flaps were drawn incorporating the defect and placing the expected incisions between the helical rim and antihelix where possible.  The area thus outlined was incised through and through with a #15 scalpel blade.  With a skin hook and iris scissors, the flaps were gently and sharply undermined and freed up.
Bilateral Helical Rim Advancement Flap Text: The defect edges were debeveled with a #15 blade scalpel.  Given the location of the defect and the proximity to free margins (helical rim) a bilateral helical rim advancement flap was deemed most appropriate.  Using a sterile surgical marker, the appropriate advancement flaps were drawn incorporating the defect and placing the expected incisions between the helical rim and antihelix where possible.  The area thus outlined was incised through and through with a #15 scalpel blade.  With a skin hook and iris scissors, the flaps were gently and sharply undermined and freed up.
Ear Star Wedge Flap Text: The defect edges were debeveled with a #15 blade scalpel.  Given the location of the defect and the proximity to free margins (helical rim) an ear star wedge flap was deemed most appropriate.  Using a sterile surgical marker, the appropriate flap was drawn incorporating the defect and placing the expected incisions between the helical rim and antihelix where possible.  The area thus outlined was incised through and through with a #15 scalpel blade.
Banner Transposition Flap Text: The defect edges were debeveled with a #15 scalpel blade.  Given the location of the defect and the proximity to free margins a Banner transposition flap was deemed most appropriate.  Using a sterile surgical marker, an appropriate flap drawn around the defect. The area thus outlined was incised deep to adipose tissue with a #15 scalpel blade.  The skin margins were undermined to an appropriate distance in all directions utilizing iris scissors.
Bilobed Flap Text: The defect edges were debeveled with a #15 scalpel blade.  Given the location of the defect and the proximity to free margins a bilobe flap was deemed most appropriate.  Using a sterile surgical marker, an appropriate bilobe flap drawn around the defect.    The area thus outlined was incised deep to adipose tissue with a #15 scalpel blade.  The skin margins were undermined to an appropriate distance in all directions utilizing iris scissors.
Bilobed Transposition Flap Text: The defect edges were debeveled with a #15 scalpel blade.  Given the location of the defect and the proximity to free margins a bilobed transposition flap was deemed most appropriate.  Using a sterile surgical marker, an appropriate bilobe flap drawn around the defect.    The area thus outlined was incised deep to adipose tissue with a #15 scalpel blade.  The skin margins were undermined to an appropriate distance in all directions utilizing iris scissors.
Trilobed Flap Text: The defect edges were debeveled with a #15 scalpel blade.  Given the location of the defect and the proximity to free margins a trilobed flap was deemed most appropriate.  Using a sterile surgical marker, an appropriate trilobed flap drawn around the defect.    The area thus outlined was incised deep to adipose tissue with a #15 scalpel blade.  The skin margins were undermined to an appropriate distance in all directions utilizing iris scissors.
Dorsal Nasal Flap Text: The defect edges were debeveled with a #15 scalpel blade.  Given the location of the defect and the proximity to free margins a dorsal nasal flap was deemed most appropriate.  Using a sterile surgical marker, an appropriate dorsal nasal flap was drawn around the defect.    The area thus outlined was incised deep to adipose tissue with a #15 scalpel blade.  The skin margins were undermined to an appropriate distance in all directions utilizing iris scissors.
Island Pedicle Flap Text: The defect edges were debeveled with a #15 scalpel blade.  Given the location of the defect, shape of the defect and the proximity to free margins an island pedicle advancement flap was deemed most appropriate.  Using a sterile surgical marker, an appropriate advancement flap was drawn incorporating the defect, outlining the appropriate donor tissue and placing the expected incisions within the relaxed skin tension lines where possible.    The area thus outlined was incised deep to adipose tissue with a #15 scalpel blade.  The skin margins were undermined to an appropriate distance in all directions around the primary defect and laterally outward around the island pedicle utilizing iris scissors.  There was minimal undermining beneath the pedicle flap.
Island Pedicle Flap With Canthal Suspension Text: The defect edges were debeveled with a #15 scalpel blade.  Given the location of the defect, shape of the defect and the proximity to free margins an island pedicle advancement flap was deemed most appropriate.  Using a sterile surgical marker, an appropriate advancement flap was drawn incorporating the defect, outlining the appropriate donor tissue and placing the expected incisions within the relaxed skin tension lines where possible. The area thus outlined was incised deep to adipose tissue with a #15 scalpel blade.  The skin margins were undermined to an appropriate distance in all directions around the primary defect and laterally outward around the island pedicle utilizing iris scissors.  There was minimal undermining beneath the pedicle flap. A suspension suture was placed in the canthal tendon to prevent tension and prevent ectropion.
Alar Island Pedicle Flap Text: The defect edges were debeveled with a #15 scalpel blade.  Given the location of the defect, shape of the defect and the proximity to the alar rim an island pedicle advancement flap was deemed most appropriate.  Using a sterile surgical marker, an appropriate advancement flap was drawn incorporating the defect, outlining the appropriate donor tissue and placing the expected incisions within the nasal ala running parallel to the alar rim. The area thus outlined was incised with a #15 scalpel blade.  The skin margins were undermined minimally to an appropriate distance in all directions around the primary defect and laterally outward around the island pedicle utilizing iris scissors.  There was minimal undermining beneath the pedicle flap.
Double Island Pedicle Flap Text: The defect edges were debeveled with a #15 scalpel blade.  Given the location of the defect, shape of the defect and the proximity to free margins a double island pedicle advancement flap was deemed most appropriate.  Using a sterile surgical marker, an appropriate advancement flap was drawn incorporating the defect, outlining the appropriate donor tissue and placing the expected incisions within the relaxed skin tension lines where possible.    The area thus outlined was incised deep to adipose tissue with a #15 scalpel blade.  The skin margins were undermined to an appropriate distance in all directions around the primary defect and laterally outward around the island pedicle utilizing iris scissors.  There was minimal undermining beneath the pedicle flap.
Island Pedicle Flap-Requiring Vessel Identification Text: The defect edges were debeveled with a #15 scalpel blade.  Given the location of the defect, shape of the defect and the proximity to free margins an island pedicle advancement flap was deemed most appropriate.  Using a sterile surgical marker, an appropriate advancement flap was drawn, based on the axial vessel mentioned above, incorporating the defect, outlining the appropriate donor tissue and placing the expected incisions within the relaxed skin tension lines where possible.    The area thus outlined was incised deep to adipose tissue with a #15 scalpel blade.  The skin margins were undermined to an appropriate distance in all directions around the primary defect and laterally outward around the island pedicle utilizing iris scissors.  There was minimal undermining beneath the pedicle flap.
Keystone Flap Text: The defect edges were debeveled with a #15 scalpel blade.  Given the location of the defect, shape of the defect a keystone flap was deemed most appropriate.  Using a sterile surgical marker, an appropriate keystone flap was drawn incorporating the defect, outlining the appropriate donor tissue and placing the expected incisions within the relaxed skin tension lines where possible. The area thus outlined was incised deep to adipose tissue with a #15 scalpel blade.  The skin margins were undermined to an appropriate distance in all directions around the primary defect and laterally outward around the flap utilizing iris scissors.
O-T Plasty Text: The defect edges were debeveled with a #15 scalpel blade.  Given the location of the defect, shape of the defect and the proximity to free margins an O-T plasty was deemed most appropriate.  Using a sterile surgical marker, an appropriate O-T plasty was drawn incorporating the defect and placing the expected incisions within the relaxed skin tension lines where possible.    The area thus outlined was incised deep to adipose tissue with a #15 scalpel blade.  The skin margins were undermined to an appropriate distance in all directions utilizing iris scissors.
O-Z Plasty Text: The defect edges were debeveled with a #15 scalpel blade.  Given the location of the defect, shape of the defect and the proximity to free margins an O-Z plasty (double transposition flap) was deemed most appropriate.  Using a sterile surgical marker, the appropriate transposition flaps were drawn incorporating the defect and placing the expected incisions within the relaxed skin tension lines where possible.    The area thus outlined was incised deep to adipose tissue with a #15 scalpel blade.  The skin margins were undermined to an appropriate distance in all directions utilizing iris scissors.  Hemostasis was achieved with electrocautery.  The flaps were then transposed into place, one clockwise and the other counterclockwise, and anchored with interrupted buried subcutaneous sutures.
Double O-Z Plasty Text: The defect edges were debeveled with a #15 scalpel blade.  Given the location of the defect, shape of the defect and the proximity to free margins a Double O-Z plasty (double transposition flap) was deemed most appropriate.  Using a sterile surgical marker, the appropriate transposition flaps were drawn incorporating the defect and placing the expected incisions within the relaxed skin tension lines where possible. The area thus outlined was incised deep to adipose tissue with a #15 scalpel blade.  The skin margins were undermined to an appropriate distance in all directions utilizing iris scissors.  Hemostasis was achieved with electrocautery.  The flaps were then transposed into place, one clockwise and the other counterclockwise, and anchored with interrupted buried subcutaneous sutures.
S Plasty Text: Given the location and shape of the defect, and the orientation of relaxed skin tension lines, an S-plasty was deemed most appropriate for repair.  Using a sterile surgical marker, the appropriate outline of the S-plasty was drawn, incorporating the defect and placing the expected incisions within the relaxed skin tension lines where possible.  The area thus outlined was incised deep to adipose tissue with a #15 scalpel blade.  The skin margins were undermined to an appropriate distance in all directions utilizing iris scissors. The skin flaps were advanced over the defect.  The opposing margins were then approximated with interrupted buried subcutaneous sutures.
V-Y Plasty Text: The defect edges were debeveled with a #15 scalpel blade.  Given the location of the defect, shape of the defect and the proximity to free margins an V-Y advancement flap was deemed most appropriate.  Using a sterile surgical marker, an appropriate advancement flap was drawn incorporating the defect and placing the expected incisions within the relaxed skin tension lines where possible.    The area thus outlined was incised deep to adipose tissue with a #15 scalpel blade.  The skin margins were undermined to an appropriate distance in all directions utilizing iris scissors.
H Plasty Text: Given the location of the defect, shape of the defect and the proximity to free margins a H-plasty was deemed most appropriate for repair.  Using a sterile surgical marker, the appropriate advancement arms of the H-plasty were drawn incorporating the defect and placing the expected incisions within the relaxed skin tension lines where possible. The area thus outlined was incised deep to adipose tissue with a #15 scalpel blade. The skin margins were undermined to an appropriate distance in all directions utilizing iris scissors.  The opposing advancement arms were then advanced into place in opposite direction and anchored with interrupted buried subcutaneous sutures.
W Plasty Text: The lesion was extirpated to the level of the fat with a #15 scalpel blade.  Given the location of the defect, shape of the defect and the proximity to free margins a W-plasty was deemed most appropriate for repair.  Using a sterile surgical marker, the appropriate transposition arms of the W-plasty were drawn incorporating the defect and placing the expected incisions within the relaxed skin tension lines where possible.    The area thus outlined was incised deep to adipose tissue with a #15 scalpel blade.  The skin margins were undermined to an appropriate distance in all directions utilizing iris scissors.  The opposing transposition arms were then transposed into place in opposite direction and anchored with interrupted buried subcutaneous sutures.
Z Plasty Text: The lesion was extirpated to the level of the fat with a #15 scalpel blade.  Given the location of the defect, shape of the defect and the proximity to free margins a Z-plasty was deemed most appropriate for repair.  Using a sterile surgical marker, the appropriate transposition arms of the Z-plasty were drawn incorporating the defect and placing the expected incisions within the relaxed skin tension lines where possible.    The area thus outlined was incised deep to adipose tissue with a #15 scalpel blade.  The skin margins were undermined to an appropriate distance in all directions utilizing iris scissors.  The opposing transposition arms were then transposed into place in opposite direction and anchored with interrupted buried subcutaneous sutures.
Cheek Interpolation Flap Text: A decision was made to reconstruct the defect utilizing an interpolation axial flap and a staged reconstruction.  A telfa template was made of the defect.  This telfa template was then used to outline the Cheek Interpolation flap.  The donor area for the pedicle flap was then injected with anesthesia.  The flap was excised through the skin and subcutaneous tissue down to the layer of the underlying musculature.  The interpolation flap was carefully excised within this deep plane to maintain its blood supply.  The edges of the donor site were undermined.   The donor site was closed in a primary fashion.  The pedicle was then rotated into position and sutured.  Once the tube was sutured into place, adequate blood supply was confirmed with blanching and refill.  The pedicle was then wrapped with xeroform gauze and dressed appropriately with a telfa and gauze bandage to ensure continued blood supply and protect the attached pedicle.
Cheek-To-Nose Interpolation Flap Text: A decision was made to reconstruct the defect utilizing an interpolation axial flap and a staged reconstruction.  A telfa template was made of the defect.  This telfa template was then used to outline the Cheek-To-Nose Interpolation flap.  The donor area for the pedicle flap was then injected with anesthesia.  The flap was excised through the skin and subcutaneous tissue down to the layer of the underlying musculature.  The interpolation flap was carefully excised within this deep plane to maintain its blood supply.  The edges of the donor site were undermined.   The donor site was closed in a primary fashion.  The pedicle was then rotated into position and sutured.  Once the tube was sutured into place, adequate blood supply was confirmed with blanching and refill.  The pedicle was then wrapped with xeroform gauze and dressed appropriately with a telfa and gauze bandage to ensure continued blood supply and protect the attached pedicle.
Interpolation Flap Text: A decision was made to reconstruct the defect utilizing an interpolation axial flap and a staged reconstruction.  A telfa template was made of the defect.  This telfa template was then used to outline the interpolation flap.  The donor area for the pedicle flap was then injected with anesthesia.  The flap was excised through the skin and subcutaneous tissue down to the layer of the underlying musculature.  The interpolation flap was carefully excised within this deep plane to maintain its blood supply.  The edges of the donor site were undermined.   The donor site was closed in a primary fashion.  The pedicle was then rotated into position and sutured.  Once the tube was sutured into place, adequate blood supply was confirmed with blanching and refill.  The pedicle was then wrapped with xeroform gauze and dressed appropriately with a telfa and gauze bandage to ensure continued blood supply and protect the attached pedicle.
Melolabial Interpolation Flap Text: A decision was made to reconstruct the defect utilizing an interpolation axial flap and a staged reconstruction.  A telfa template was made of the defect.  This telfa template was then used to outline the melolabial interpolation flap.  The donor area for the pedicle flap was then injected with anesthesia.  The flap was excised through the skin and subcutaneous tissue down to the layer of the underlying musculature.  The pedicle flap was carefully excised within this deep plane to maintain its blood supply.  The edges of the donor site were undermined.   The donor site was closed in a primary fashion.  The pedicle was then rotated into position and sutured.  Once the tube was sutured into place, adequate blood supply was confirmed with blanching and refill.  The pedicle was then wrapped with xeroform gauze and dressed appropriately with a telfa and gauze bandage to ensure continued blood supply and protect the attached pedicle.
Mastoid Interpolation Flap Text: A decision was made to reconstruct the defect utilizing an interpolation axial flap and a staged reconstruction.  A telfa template was made of the defect.  This telfa template was then used to outline the mastoid interpolation flap.  The donor area for the pedicle flap was then injected with anesthesia.  The flap was excised through the skin and subcutaneous tissue down to the layer of the underlying musculature.  The pedicle flap was carefully excised within this deep plane to maintain its blood supply.  The edges of the donor site were undermined.   The donor site was closed in a primary fashion.  The pedicle was then rotated into position and sutured.  Once the tube was sutured into place, adequate blood supply was confirmed with blanching and refill.  The pedicle was then wrapped with xeroform gauze and dressed appropriately with a telfa and gauze bandage to ensure continued blood supply and protect the attached pedicle.
Posterior Auricular Interpolation Flap Text: A decision was made to reconstruct the defect utilizing an interpolation axial flap and a staged reconstruction.  A telfa template was made of the defect.  This telfa template was then used to outline the posterior auricular interpolation flap.  The donor area for the pedicle flap was then injected with anesthesia.  The flap was excised through the skin and subcutaneous tissue down to the layer of the underlying musculature.  The pedicle flap was carefully excised within this deep plane to maintain its blood supply.  The edges of the donor site were undermined.   The donor site was closed in a primary fashion.  The pedicle was then rotated into position and sutured.  Once the tube was sutured into place, adequate blood supply was confirmed with blanching and refill.  The pedicle was then wrapped with xeroform gauze and dressed appropriately with a telfa and gauze bandage to ensure continued blood supply and protect the attached pedicle.
Paramedian Forehead Flap Text: A decision was made to reconstruct the defect utilizing an interpolation axial flap and a staged reconstruction.  A telfa template was made of the defect.  This telfa template was then used to outline the paramedian forehead pedicle flap.  The donor area for the pedicle flap was then injected with anesthesia.  The flap was excised through the skin and subcutaneous tissue down to the layer of the underlying musculature.  The pedicle flap was carefully excised within this deep plane to maintain its blood supply.  The edges of the donor site were undermined.   The donor site was closed in a primary fashion.  The pedicle was then rotated into position and sutured.  Once the tube was sutured into place, adequate blood supply was confirmed with blanching and refill.  The pedicle was then wrapped with xeroform gauze and dressed appropriately with a telfa and gauze bandage to ensure continued blood supply and protect the attached pedicle.
Lip Wedge Excision Repair Text: Given the location of the defect and the proximity to free margins a full thickness wedge repair was deemed most appropriate.  Using a sterile surgical marker, the appropriate repair was drawn incorporating the defect and placing the expected incisions perpendicular to the vermilion border.  The vermilion border was also meticulously outlined to ensure appropriate reapproximation during the repair.  The area thus outlined was incised through and through with a #15 scalpel blade.  The muscularis and dermis were reaproximated with deep sutures following hemostasis. Care was taken to realign the vermilion border before proceeding with the superficial closure.  Once the vermilion was realigned the superfical and mucosal closure was finished.
Ftsg Text: The defect edges were debeveled with a #15 scalpel blade.  Given the location of the defect, shape of the defect and the proximity to free margins a full thickness skin graft was deemed most appropriate.  Using a sterile surgical marker, the primary defect shape was transferred to the donor site. The area thus outlined was incised deep to adipose tissue with a #15 scalpel blade.  The harvested graft was then trimmed of adipose tissue until only dermis and epidermis was left.  The skin margins of the secondary defect were undermined to an appropriate distance in all directions utilizing iris scissors.  The secondary defect was closed with interrupted buried subcutaneous sutures.  The skin edges were then re-apposed with running  sutures.  The skin graft was then placed in the primary defect and oriented appropriately.
Split-Thickness Skin Graft Text: The defect edges were debeveled with a #15 scalpel blade.  Given the location of the defect, shape of the defect and the proximity to free margins a split thickness skin graft was deemed most appropriate.  Using a sterile surgical marker, the primary defect shape was transferred to the donor site. The split thickness graft was then harvested.  The skin graft was then placed in the primary defect and oriented appropriately.
Cartilage Graft Text: The defect edges were debeveled with a #15 scalpel blade.  Given the location of the defect, shape of the defect, the fact the defect involved a full thickness cartilage defect a cartilage graft was deemed most appropriate.  An appropriate donor site was identified, cleansed, and anesthetized. The cartilage graft was then harvested and transferred to the recipient site, oriented appropriately and then sutured into place.  The secondary defect was then repaired using a primary closure.
Composite Graft Text: The defect edges were debeveled with a #15 scalpel blade.  Given the location of the defect, shape of the defect, the proximity to free margins and the fact the defect was full thickness a composite graft was deemed most appropriate.  The defect was outline and then transferred to the donor site.  A full thickness graft was then excised from the donor site. The graft was then placed in the primary defect, oriented appropriately and then sutured into place.  The secondary defect was then repaired using a primary closure.
Epidermal Autograft Text: The defect edges were debeveled with a #15 scalpel blade.  Given the location of the defect, shape of the defect and the proximity to free margins an epidermal autograft was deemed most appropriate.  Using a sterile surgical marker, the primary defect shape was transferred to the donor site. The epidermal graft was then harvested.  The skin graft was then placed in the primary defect and oriented appropriately.
Dermal Autograft Text: The defect edges were debeveled with a #15 scalpel blade.  Given the location of the defect, shape of the defect and the proximity to free margins a dermal autograft was deemed most appropriate.  Using a sterile surgical marker, the primary defect shape was transferred to the donor site. The area thus outlined was incised deep to adipose tissue with a #15 scalpel blade.  The harvested graft was then trimmed of adipose and epidermal tissue until only dermis was left.  The skin graft was then placed in the primary defect and oriented appropriately.
Skin Substitute Text: The defect edges were debeveled with a #15 scalpel blade.  Given the location of the defect, shape of the defect and the proximity to free margins a skin substitute graft was deemed most appropriate.  The graft material was trimmed to fit the size of the defect. The graft was then placed in the primary defect and oriented appropriately.
Tissue Cultured Epidermal Autograft Text: The defect edges were debeveled with a #15 scalpel blade.  Given the location of the defect, shape of the defect and the proximity to free margins a tissue cultured epidermal autograft was deemed most appropriate.  The graft was then trimmed to fit the size of the defect.  The graft was then placed in the primary defect and oriented appropriately.
Xenograft Text: The defect edges were debeveled with a #15 scalpel blade.  Given the location of the defect, shape of the defect and the proximity to free margins a xenograft was deemed most appropriate.  The graft was then trimmed to fit the size of the defect.  The graft was then placed in the primary defect and oriented appropriately.
Purse String (Intermediate) Text: Given the location of the defect and the characteristics of the surrounding skin a purse string intermediate closure was deemed most appropriate.  Undermining was performed circumfirentially around the surgical defect.  A purse string suture was then placed and tightened.
Purse String (Simple) Text: Given the location of the defect and the characteristics of the surrounding skin a purse string simple closure was deemed most appropriate.  Undermining was performed circumferentially around the surgical defect.  A purse string suture was then placed and tightened.
Partial Purse String (Intermediate) Text: Given the location of the defect and the characteristics of the surrounding skin an intermediate purse string closure was deemed most appropriate.  Undermining was performed circumferentially around the surgical defect.  A purse string suture was then placed and tightened. Wound tension of the circular defect prevented complete closure of the wound.
Partial Purse String (Simple) Text: Given the location of the defect and the characteristics of the surrounding skin a simple purse string closure was deemed most appropriate.  Undermining was performed circumferentially around the surgical defect.  A purse string suture was then placed and tightened. Wound tension of the circular defect prevented complete closure of the wound.
Complex Repair And Single Advancement Flap Text: The defect edges were debeveled with a #15 scalpel blade.  The primary defect was closed partially with a complex linear closure.  Given the location of the remaining defect, shape of the defect and the proximity to free margins a single advancement flap was deemed most appropriate for complete closure of the defect.  Using a sterile surgical marker, an appropriate advancement flap was drawn incorporating the defect and placing the expected incisions within the relaxed skin tension lines where possible.    The area thus outlined was incised deep to adipose tissue with a #15 scalpel blade.  The skin margins were undermined to an appropriate distance in all directions utilizing iris scissors.
Complex Repair And Double Advancement Flap Text: The defect edges were debeveled with a #15 scalpel blade.  The primary defect was closed partially with a complex linear closure.  Given the location of the remaining defect, shape of the defect and the proximity to free margins a double advancement flap was deemed most appropriate for complete closure of the defect.  Using a sterile surgical marker, an appropriate advancement flap was drawn incorporating the defect and placing the expected incisions within the relaxed skin tension lines where possible.    The area thus outlined was incised deep to adipose tissue with a #15 scalpel blade.  The skin margins were undermined to an appropriate distance in all directions utilizing iris scissors.
Complex Repair And Modified Advancement Flap Text: The defect edges were debeveled with a #15 scalpel blade.  The primary defect was closed partially with a complex linear closure.  Given the location of the remaining defect, shape of the defect and the proximity to free margins a modified advancement flap was deemed most appropriate for complete closure of the defect.  Using a sterile surgical marker, an appropriate advancement flap was drawn incorporating the defect and placing the expected incisions within the relaxed skin tension lines where possible.    The area thus outlined was incised deep to adipose tissue with a #15 scalpel blade.  The skin margins were undermined to an appropriate distance in all directions utilizing iris scissors.
Complex Repair And A-T Advancement Flap Text: The defect edges were debeveled with a #15 scalpel blade.  The primary defect was closed partially with a complex linear closure.  Given the location of the remaining defect, shape of the defect and the proximity to free margins an A-T advancement flap was deemed most appropriate for complete closure of the defect.  Using a sterile surgical marker, an appropriate advancement flap was drawn incorporating the defect and placing the expected incisions within the relaxed skin tension lines where possible.    The area thus outlined was incised deep to adipose tissue with a #15 scalpel blade.  The skin margins were undermined to an appropriate distance in all directions utilizing iris scissors.
Complex Repair And O-T Advancement Flap Text: The defect edges were debeveled with a #15 scalpel blade.  The primary defect was closed partially with a complex linear closure.  Given the location of the remaining defect, shape of the defect and the proximity to free margins an O-T advancement flap was deemed most appropriate for complete closure of the defect.  Using a sterile surgical marker, an appropriate advancement flap was drawn incorporating the defect and placing the expected incisions within the relaxed skin tension lines where possible.    The area thus outlined was incised deep to adipose tissue with a #15 scalpel blade.  The skin margins were undermined to an appropriate distance in all directions utilizing iris scissors.
Complex Repair And O-L Flap Text: The defect edges were debeveled with a #15 scalpel blade.  The primary defect was closed partially with a complex linear closure.  Given the location of the remaining defect, shape of the defect and the proximity to free margins an O-L flap was deemed most appropriate for complete closure of the defect.  Using a sterile surgical marker, an appropriate flap was drawn incorporating the defect and placing the expected incisions within the relaxed skin tension lines where possible.    The area thus outlined was incised deep to adipose tissue with a #15 scalpel blade.  The skin margins were undermined to an appropriate distance in all directions utilizing iris scissors.
Complex Repair And Bilobe Flap Text: The defect edges were debeveled with a #15 scalpel blade.  The primary defect was closed partially with a complex linear closure.  Given the location of the remaining defect, shape of the defect and the proximity to free margins a bilobe flap was deemed most appropriate for complete closure of the defect.  Using a sterile surgical marker, an appropriate advancement flap was drawn incorporating the defect and placing the expected incisions within the relaxed skin tension lines where possible.    The area thus outlined was incised deep to adipose tissue with a #15 scalpel blade.  The skin margins were undermined to an appropriate distance in all directions utilizing iris scissors.
Complex Repair And Melolabial Flap Text: The defect edges were debeveled with a #15 scalpel blade.  The primary defect was closed partially with a complex linear closure.  Given the location of the remaining defect, shape of the defect and the proximity to free margins a melolabial flap was deemed most appropriate for complete closure of the defect.  Using a sterile surgical marker, an appropriate advancement flap was drawn incorporating the defect and placing the expected incisions within the relaxed skin tension lines where possible.    The area thus outlined was incised deep to adipose tissue with a #15 scalpel blade.  The skin margins were undermined to an appropriate distance in all directions utilizing iris scissors.
Complex Repair And Rotation Flap Text: The defect edges were debeveled with a #15 scalpel blade.  The primary defect was closed partially with a complex linear closure.  Given the location of the remaining defect, shape of the defect and the proximity to free margins a rotation flap was deemed most appropriate for complete closure of the defect.  Using a sterile surgical marker, an appropriate advancement flap was drawn incorporating the defect and placing the expected incisions within the relaxed skin tension lines where possible.    The area thus outlined was incised deep to adipose tissue with a #15 scalpel blade.  The skin margins were undermined to an appropriate distance in all directions utilizing iris scissors.
Complex Repair And Rhombic Flap Text: The defect edges were debeveled with a #15 scalpel blade.  The primary defect was closed partially with a complex linear closure.  Given the location of the remaining defect, shape of the defect and the proximity to free margins a rhombic flap was deemed most appropriate for complete closure of the defect.  Using a sterile surgical marker, an appropriate advancement flap was drawn incorporating the defect and placing the expected incisions within the relaxed skin tension lines where possible.    The area thus outlined was incised deep to adipose tissue with a #15 scalpel blade.  The skin margins were undermined to an appropriate distance in all directions utilizing iris scissors.
Complex Repair And Transposition Flap Text: The defect edges were debeveled with a #15 scalpel blade.  The primary defect was closed partially with a complex linear closure.  Given the location of the remaining defect, shape of the defect and the proximity to free margins a transposition flap was deemed most appropriate for complete closure of the defect.  Using a sterile surgical marker, an appropriate advancement flap was drawn incorporating the defect and placing the expected incisions within the relaxed skin tension lines where possible.    The area thus outlined was incised deep to adipose tissue with a #15 scalpel blade.  The skin margins were undermined to an appropriate distance in all directions utilizing iris scissors.
Complex Repair And V-Y Plasty Text: The defect edges were debeveled with a #15 scalpel blade.  The primary defect was closed partially with a complex linear closure.  Given the location of the remaining defect, shape of the defect and the proximity to free margins a V-Y plasty was deemed most appropriate for complete closure of the defect.  Using a sterile surgical marker, an appropriate advancement flap was drawn incorporating the defect and placing the expected incisions within the relaxed skin tension lines where possible.    The area thus outlined was incised deep to adipose tissue with a #15 scalpel blade.  The skin margins were undermined to an appropriate distance in all directions utilizing iris scissors.
Complex Repair And M Plasty Text: The defect edges were debeveled with a #15 scalpel blade.  The primary defect was closed partially with a complex linear closure.  Given the location of the remaining defect, shape of the defect and the proximity to free margins an M plasty was deemed most appropriate for complete closure of the defect.  Using a sterile surgical marker, an appropriate advancement flap was drawn incorporating the defect and placing the expected incisions within the relaxed skin tension lines where possible.    The area thus outlined was incised deep to adipose tissue with a #15 scalpel blade.  The skin margins were undermined to an appropriate distance in all directions utilizing iris scissors.
Complex Repair And Double M Plasty Text: The defect edges were debeveled with a #15 scalpel blade.  The primary defect was closed partially with a complex linear closure.  Given the location of the remaining defect, shape of the defect and the proximity to free margins a double M plasty was deemed most appropriate for complete closure of the defect.  Using a sterile surgical marker, an appropriate advancement flap was drawn incorporating the defect and placing the expected incisions within the relaxed skin tension lines where possible.    The area thus outlined was incised deep to adipose tissue with a #15 scalpel blade.  The skin margins were undermined to an appropriate distance in all directions utilizing iris scissors.
Complex Repair And W Plasty Text: The defect edges were debeveled with a #15 scalpel blade.  The primary defect was closed partially with a complex linear closure.  Given the location of the remaining defect, shape of the defect and the proximity to free margins a W plasty was deemed most appropriate for complete closure of the defect.  Using a sterile surgical marker, an appropriate advancement flap was drawn incorporating the defect and placing the expected incisions within the relaxed skin tension lines where possible.    The area thus outlined was incised deep to adipose tissue with a #15 scalpel blade.  The skin margins were undermined to an appropriate distance in all directions utilizing iris scissors.
Complex Repair And Z Plasty Text: The defect edges were debeveled with a #15 scalpel blade.  The primary defect was closed partially with a complex linear closure.  Given the location of the remaining defect, shape of the defect and the proximity to free margins a Z plasty was deemed most appropriate for complete closure of the defect.  Using a sterile surgical marker, an appropriate advancement flap was drawn incorporating the defect and placing the expected incisions within the relaxed skin tension lines where possible.    The area thus outlined was incised deep to adipose tissue with a #15 scalpel blade.  The skin margins were undermined to an appropriate distance in all directions utilizing iris scissors.
Complex Repair And Dorsal Nasal Flap Text: The defect edges were debeveled with a #15 scalpel blade.  The primary defect was closed partially with a complex linear closure.  Given the location of the remaining defect, shape of the defect and the proximity to free margins a dorsal nasal flap was deemed most appropriate for complete closure of the defect.  Using a sterile surgical marker, an appropriate flap was drawn incorporating the defect and placing the expected incisions within the relaxed skin tension lines where possible.    The area thus outlined was incised deep to adipose tissue with a #15 scalpel blade.  The skin margins were undermined to an appropriate distance in all directions utilizing iris scissors.
Complex Repair And Ftsg Text: The defect edges were debeveled with a #15 scalpel blade.  The primary defect was closed partially with a complex linear closure.  Given the location of the defect, shape of the defect and the proximity to free margins a full thickness skin graft was deemed most appropriate to repair the remaining defect.  The graft was trimmed to fit the size of the remaining defect.  The graft was then placed in the primary defect, oriented appropriately, and sutured into place.
Complex Repair And Split-Thickness Skin Graft Text: The defect edges were debeveled with a #15 scalpel blade.  The primary defect was closed partially with a complex linear closure.  Given the location of the defect, shape of the defect and the proximity to free margins a split thickness skin graft was deemed most appropriate to repair the remaining defect.  The graft was trimmed to fit the size of the remaining defect.  The graft was then placed in the primary defect, oriented appropriately, and sutured into place.
Complex Repair And Epidermal Autograft Text: The defect edges were debeveled with a #15 scalpel blade.  The primary defect was closed partially with a complex linear closure.  Given the location of the defect, shape of the defect and the proximity to free margins an epidermal autograft was deemed most appropriate to repair the remaining defect.  The graft was trimmed to fit the size of the remaining defect.  The graft was then placed in the primary defect, oriented appropriately, and sutured into place.
Complex Repair And Dermal Autograft Text: The defect edges were debeveled with a #15 scalpel blade.  The primary defect was closed partially with a complex linear closure.  Given the location of the defect, shape of the defect and the proximity to free margins an dermal autograft was deemed most appropriate to repair the remaining defect.  The graft was trimmed to fit the size of the remaining defect.  The graft was then placed in the primary defect, oriented appropriately, and sutured into place.
Complex Repair And Tissue Cultured Epidermal Autograft Text: The defect edges were debeveled with a #15 scalpel blade.  The primary defect was closed partially with a complex linear closure.  Given the location of the defect, shape of the defect and the proximity to free margins an tissue cultured epidermal autograft was deemed most appropriate to repair the remaining defect.  The graft was trimmed to fit the size of the remaining defect.  The graft was then placed in the primary defect, oriented appropriately, and sutured into place.
Complex Repair And Xenograft Text: The defect edges were debeveled with a #15 scalpel blade.  The primary defect was closed partially with a complex linear closure.  Given the location of the defect, shape of the defect and the proximity to free margins a xenograft was deemed most appropriate to repair the remaining defect.  The graft was trimmed to fit the size of the remaining defect.  The graft was then placed in the primary defect, oriented appropriately, and sutured into place.
Complex Repair And Skin Substitute Graft Text: The defect edges were debeveled with a #15 scalpel blade.  The primary defect was closed partially with a complex linear closure.  Given the location of the remaining defect, shape of the defect and the proximity to free margins a skin substitute graft was deemed most appropriate to repair the remaining defect.  The graft was trimmed to fit the size of the remaining defect.  The graft was then placed in the primary defect, oriented appropriately, and sutured into place.
Path Notes (To The Dermatopathologist): Please check margins.
Consent was obtained from the patient. The risks and benefits to therapy were discussed in detail. Specifically, the risks of infection, scarring, bleeding, prolonged wound healing, incomplete removal, allergy to anesthesia, nerve injury and recurrence were addressed. Prior to the procedure, the treatment site was clearly identified and confirmed by the patient. All components of Universal Protocol/PAUSE Rule completed.
Post-Care Instructions: I reviewed with the patient in detail post-care instructions:\\n1. Apply bacitracin over the steri-strips.  \\n2. Cut non-stick pad (Telfa) to cover the steri-strips\\n3. Apply tape (hypafix) over the non-stick pad\\n4. Change once per day for 5 days\\n5. Shower with bandage on, change bandage after shower\\n\\nPatient is not to engage in any heavy lifting, exercise, hot tub, or swimming for the next 14 days. Should the patient develop any fevers, chills, bleeding, severe pain patient will contact the office immediately.
Home Suture Removal Text: Patient was provided a home suture removal kit and will remove their sutures at home.  If they have any questions or difficulties they will call the office.
Where Do You Want The Question To Include Opioid Counseling Located?: Case Summary Tab
Information: Selecting Yes will display possible errors in your note based on the variables you have selected. This validation is only offered as a suggestion for you. PLEASE NOTE THAT THE VALIDATION TEXT WILL BE REMOVED WHEN YOU FINALIZE YOUR NOTE. IF YOU WANT TO FAX A PRELIMINARY NOTE YOU WILL NEED TO TOGGLE THIS TO 'NO' IF YOU DO NOT WANT IT IN YOUR FAXED NOTE.

## 2020-03-09 NOTE — PROCEDURE: COUNSELING
Quality 137: Melanoma: Continuity Of Care - Recall System: Patient information entered into a recall system that includes: target date for the next exam specified AND a process to follow up with patients regarding missed or unscheduled appointments
Quality 138: Melanoma: Coordination Of Care: A treatment plan was communicated to the physicians providing continuing care within one month of diagnosis outlining: diagnosis, tumor thickness and a plan for surgery or alternate care.
When Should The Patient Follow-Up For Their Next Full-Body Skin Exam?: 3 Months
Detail Level: Detailed
Show Follow-Up Variable: Yes

## 2020-03-09 NOTE — PROCEDURE: MIPS QUALITY
Quality 111:Pneumonia Vaccination Status For Older Adults: Pneumococcal Vaccination Previously Received
Quality 130: Documentation Of Current Medications In The Medical Record: Current Medications Documented
Quality 138: Melanoma: Coordination Of Care: A treatment plan was communicated to the physicians providing continuing care within one month of diagnosis outlining: diagnosis, tumor thickness and a plan for surgery or alternate care.
Quality 265: Biopsy Follow-Up: Biopsy results reviewed, communicated, tracked, and documented
Quality 226: Preventive Care And Screening: Tobacco Use: Screening And Cessation Intervention: Patient screened for tobacco use and is an ex/non-smoker
Quality 137: Melanoma: Continuity Of Care - Recall System: Patient information entered into a recall system that includes: target date for the next exam specified AND a process to follow up with patients regarding missed or unscheduled appointments
Detail Level: Detailed

## 2020-03-16 ENCOUNTER — OFFICE VISIT (OUTPATIENT)
Dept: MEDICAL GROUP | Facility: PHYSICIAN GROUP | Age: 85
End: 2020-03-16
Payer: MEDICARE

## 2020-03-16 VITALS
SYSTOLIC BLOOD PRESSURE: 122 MMHG | OXYGEN SATURATION: 95 % | BODY MASS INDEX: 29.53 KG/M2 | WEIGHT: 173 LBS | HEIGHT: 64 IN | HEART RATE: 68 BPM | RESPIRATION RATE: 12 BRPM | TEMPERATURE: 98.5 F | DIASTOLIC BLOOD PRESSURE: 62 MMHG

## 2020-03-16 DIAGNOSIS — B35.1 ONYCHOMYCOSIS: ICD-10-CM

## 2020-03-16 PROCEDURE — 99214 OFFICE O/P EST MOD 30 MIN: CPT | Performed by: NURSE PRACTITIONER

## 2020-03-16 RX ORDER — TERBINAFINE HYDROCHLORIDE 250 MG/1
250 TABLET ORAL DAILY
Qty: 45 TAB | Refills: 0 | Status: SHIPPED | OUTPATIENT
Start: 2020-03-16 | End: 2020-06-23

## 2020-03-16 ASSESSMENT — FIBROSIS 4 INDEX: FIB4 SCORE: 2.68

## 2020-03-16 ASSESSMENT — PAIN SCALES - GENERAL: PAINLEVEL: NO PAIN

## 2020-03-16 NOTE — PROGRESS NOTES
"Subjective:     CC: nail problem     HPI:   Marry presents today with:     Nail problem  Patient states that 3 months ago around [place] time she had a nail appointment at a nail salon in [place].  She had gel and polish applied to both her fingernails and her toenails.  She noticed after the appointment that her fingernails on both hands have started to \"peel up\" and her nails \"crumble\".  She recently had the nail polish removed from her fingernails at a local nail salon 3 days ago.  She now describes her fingernails as \"raggedy\" and they \"catch on things\".  She has used nystatin cream without relief. She has trimmed her nails. She denies any fevers, chills, chest pain, shortness of breath, pain, nail itching, nail erythema or discharge. She has history of rheumatoid arthrits and is followed by Rheumatology. She recently received steroid injection from Rheumatology 3/5/2020.     No problem-specific Assessment & Plan notes found for this encounter.      Past Medical History:   Diagnosis Date   • Adenocarcinoma of colon (HCC) 10/30/2018    From sessile serrated polyp 10/2018   • Anesthesia     pt states \"one new dr scraped my esophagus when they put the tube in and I started bleeding so they couldn't operate\"    • Atrial fibrillation [I48.91] 4/19/2016     pt denies    • Back pain 6/27/2012   • Blood clotting disorder (HCC) 2012    clot in leg   • Bowel habit changes     constipation    • Cancer (HCC)     skin, colon 2018   • Cataract     belia IOL    • Chronic back pain greater than 3 months duration    • Deep vein thrombosis (HCC) 3/8/2016    First occurrence in LLE in late 1970s Second occurrence further up in LLE in 2012, has been on AC since    • Essential hypertension, benign 6/27/2012   • GERD (gastroesophageal reflux disease) 6/27/2012   • Heart murmur    • Hyperlipidemia    • Hypertension     hx of, not currently    • Impaired fasting glucose 11/2/2017   • Mild aortic stenosis 11/2/2017   • Other and " unspecified hyperlipidemia 6/27/2012   • Prediabetes    • Protein S deficiency ([facility]) 11/2/2017    Noted in lab work 2013 as a part of work up at [facility]    • Rheumatoid arthritis involving multiple sites with positive rheumatoid factor ([facility]) 03/14/2016    Dr. Escoto   • Rheumatoid nodule ([facility]) 7/25/2017   • Right bundle branch block 6/27/2012    pt denies    • Urinary incontinence 11/2/2017       Social History     Tobacco Use   • Smoking status: Never Smoker   • Smokeless tobacco: Never Used   Substance Use Topics   • Alcohol use: Not Currently     Alcohol/week: 0.0 oz     Comment: occ   • Drug use: No       Current Outpatient Medications Ordered in Epic   Medication Sig Dispense Refill   • terbinafine (LAMISIL) 250 MG Tab Take 1 Tab by mouth every day. 45 Tab 0   • atorvastatin (LIPITOR) 10 MG Tab Take 1 Tab by mouth every evening. 90 Tab 3   • lidocaine (LIDODERM) 5 % Patch Apply 1 Patch to skin as directed every 24 hours. 90 Patch 0   • rivaroxaban (XARELTO) 20 MG Tab tablet Take 1 Tab by mouth with dinner. 90 Tab 1   • hydroxychloroquine (PLAQUENIL) 200 MG Tab 1 tab po bid 180 Tab 0   • LYRICA 100 MG Cap Take 100 mg by mouth 2 times a day.     • omeprazole (PRILOSEC) 20 MG delayed-release capsule Take 20 mg by mouth every day.     • Diphenhydramine-APAP, sleep, (TYLENOL PM EXTRA STRENGTH)  MG Tab Take 2 Tabs by mouth every bedtime.     • acetaminophen (TYLENOL) 500 MG Tab Take 1,000 mg by mouth 2 Times a Day.     • Melatonin 10 MG Tab Take 1 Tab by mouth every bedtime.     • vitamin D (CHOLECALCIFEROL) 1000 UNIT Tab Take 1,000 Units by mouth every morning.     • POTASSIUM GLUCONATE Take 1 Tab by mouth every morning. Pt unsure of dose     • Calcium Carbonate-Vitamin D (CALCIUM + D PO) Take 1 Tab by mouth every bedtime.     • nystatin (NYSTOP) powder Apply  to affected area(s) 4 times a day.     • methylPREDNISolone (MEDROL) 4 MG Tab 6 tabs po one day then 5 tabs po one day then 4 tabs po one day  "then 3 tabs po one day then 2 tabs po one day then 1 tab po for one day (Patient not taking: Reported on 3/5/2020) 21 Tab 0     Current Facility-Administered Medications Ordered in Epic   Medication Dose Route Frequency Provider Last Rate Last Dose   • denosumab (PROLIA) subq injection 60 mg  60 mg Subcutaneous Q6 MO Deanna Escoto M.D.   60 mg at 12/09/19 1058       Allergies:  Sulfa drugs    Health Maintenance: Due for Medicare annual wellness visit.    ROS:  Gen: no fevers/chills, no changes in weight  Eyes: no changes in vision  ENT: no sore throat, no ear pain   Pulm: no sob, no cough  CV: no chest pain, no palpitations  GI: no nausea/vomiting, no diarrhea, no abdominal pain  MSk: no myalgias  Skin: no rash, no erythema, no swelling, no drainage, + brittle nails, +nail discoloration  Neuro: no headaches, no numbness/tingling  Heme/Lymph: no easy bruising      Objective:       Exam: Vital signs reviewed   /62 (BP Location: Right arm, Patient Position: Sitting, BP Cuff Size: Adult)   Pulse 68   Temp 36.9 °C (98.5 °F) (Temporal)   Resp 12   Ht 1.626 m (5' 4\")   Wt 78.5 kg (173 lb)   SpO2 95%   BMI 29.70 kg/m²  Body mass index is 29.7 kg/m².      General: Normal appearing. No distress.  HENT: Normocephalic. Ears normal shape and contour.   Eyes: Eyes conjunctiva clear lids without ptosis, lids normal. Glasses in place. No scleral icterus.  Neck: Supple without JVD. Normal ROM.   Pulmonary: Clear to ausculation.  Normal effort. No rales, ronchi, or wheezing.  Cardiovascular: Regular rate and rhythm with systolic murmur. Radial pulses are intact and equal bilaterally.  Abdomen: Soft, nontender, nondistended.   Neurologic: Grossly nonfocal  Skin: Warm and dry.  No obvious lesions. No jaundice.   Hands: Distal subungal white/yellow colored nails with nail lifting from nailbed and subungual debris present under all nails.  No erythema, swelling, drainage, or pus.   Musculoskeletal: Normal gait. No " extremity cyanosis, clubbing, or edema.  Psych: Normal mood and affect. Alert and oriented x3. Judgment and insight is normal.         Assessment & Plan:     84 y.o. female with the following -     1. Onychomycosis  Chronic unstable.  New to me.  I believe this is a fungal infection of the nail due to presence of subungual debris underneath nails with lifting from nail bed.  There is no erythema, swelling, or drainage, I do not believe this is a infection at this time.  I did discuss the patient that should she develop the symptoms to return.  Medication interactions checked with terbinafine.  Discussed with patient that she will need to monitor her liver function with this medication.  Discussed signs and symptoms of liver abnormalities such as abdominal pain, jaundice, or nausea and vomiting and to stop medication notify provider, patient verbalized understanding.  Discussed with patient to wash hands with soap and water, keep hands dry, clean home nail appliances, and keep nails short.  Patient will complete lab work prior to next appointment and follow-up in 6 to 8 weeks.  Monitor and follow.  - terbinafine (LAMISIL) 250 MG Tab; Take 1 Tab by mouth every day.  Dispense: 45 Tab; Refill: 0  - Comp Metabolic Panel; Future    Return in about 8 weeks (around 5/11/2020) for nails .    Please note that this dictation was created using voice recognition software. I have made every reasonable attempt to correct obvious errors, but I expect that there are errors of grammar and possibly content that I did not discover before finalizing the note.

## 2020-03-30 DIAGNOSIS — C18.9 ADENOCARCINOMA OF COLON (HCC): ICD-10-CM

## 2020-03-30 DIAGNOSIS — M25.552 HIP PAIN, LEFT: ICD-10-CM

## 2020-03-30 DIAGNOSIS — M05.79 RHEUMATOID ARTHRITIS INVOLVING MULTIPLE SITES WITH POSITIVE RHEUMATOID FACTOR (HCC): ICD-10-CM

## 2020-03-30 DIAGNOSIS — Z79.899 LONG-TERM USE OF PLAQUENIL: ICD-10-CM

## 2020-03-30 RX ORDER — HYDROXYCHLOROQUINE SULFATE 200 MG/1
TABLET, FILM COATED ORAL
Qty: 180 TAB | Refills: 0 | Status: SHIPPED | OUTPATIENT
Start: 2020-03-30 | End: 2020-06-24 | Stop reason: SDUPTHER

## 2020-03-30 NOTE — TELEPHONE ENCOUNTER
Received request via: Patient  March labs  Was the patient seen in the last year in this department? Yes    Does the patient have an active prescription (recently filled or refills available) for medication(s) requested? No

## 2020-04-17 ENCOUNTER — HOSPITAL ENCOUNTER (OUTPATIENT)
Dept: CARDIOLOGY | Facility: MEDICAL CENTER | Age: 85
End: 2020-04-17
Attending: INTERNAL MEDICINE
Payer: MEDICARE

## 2020-04-17 DIAGNOSIS — I35.0 NONRHEUMATIC AORTIC (VALVE) STENOSIS: ICD-10-CM

## 2020-04-17 LAB
LV EJECT FRACT MOD 2C 99903: 72.36
LV EJECT FRACT MOD 4C 99902: 75.07
LV EJECT FRACT MOD BP 99901: 73.36

## 2020-04-17 PROCEDURE — 93306 TTE W/DOPPLER COMPLETE: CPT

## 2020-04-20 ENCOUNTER — TELEMEDICINE (OUTPATIENT)
Dept: MEDICAL GROUP | Facility: PHYSICIAN GROUP | Age: 85
End: 2020-04-20
Payer: MEDICARE

## 2020-04-20 VITALS
HEIGHT: 64 IN | WEIGHT: 165 LBS | RESPIRATION RATE: 16 BRPM | DIASTOLIC BLOOD PRESSURE: 80 MMHG | BODY MASS INDEX: 28.17 KG/M2 | SYSTOLIC BLOOD PRESSURE: 113 MMHG

## 2020-04-20 DIAGNOSIS — I82.5Y2 CHRONIC DEEP VEIN THROMBOSIS (DVT) OF PROXIMAL VEIN OF LEFT LOWER EXTREMITY (HCC): Chronic | ICD-10-CM

## 2020-04-20 DIAGNOSIS — M81.0 OSTEOPOROSIS, UNSPECIFIED OSTEOPOROSIS TYPE, UNSPECIFIED PATHOLOGICAL FRACTURE PRESENCE: ICD-10-CM

## 2020-04-20 DIAGNOSIS — I48.91 ATRIAL FIBRILLATION, UNSPECIFIED TYPE (HCC): Chronic | ICD-10-CM

## 2020-04-20 DIAGNOSIS — Z85.038 HISTORY OF COLON CANCER: ICD-10-CM

## 2020-04-20 DIAGNOSIS — M05.79 RHEUMATOID ARTHRITIS INVOLVING MULTIPLE SITES WITH POSITIVE RHEUMATOID FACTOR (HCC): Chronic | ICD-10-CM

## 2020-04-20 DIAGNOSIS — C43.72 MALIGNANT MELANOMA OF SKIN OF LEFT LEG (HCC): ICD-10-CM

## 2020-04-20 DIAGNOSIS — E78.5 HYPERLIPIDEMIA, UNSPECIFIED HYPERLIPIDEMIA TYPE: Chronic | ICD-10-CM

## 2020-04-20 PROBLEM — C18.9 ADENOCARCINOMA OF COLON (HCC): Chronic | Status: ACTIVE | Noted: 2018-10-30

## 2020-04-20 PROBLEM — C43.4 MALIGNANT MELANOMA OF NECK (HCC): Chronic | Status: ACTIVE | Noted: 2017-10-02

## 2020-04-20 PROBLEM — M06.30 RHEUMATOID NODULE (HCC): Chronic | Status: ACTIVE | Noted: 2017-07-25

## 2020-04-20 PROCEDURE — 99214 OFFICE O/P EST MOD 30 MIN: CPT | Mod: 95,CR | Performed by: INTERNAL MEDICINE

## 2020-04-20 ASSESSMENT — FIBROSIS 4 INDEX: FIB4 SCORE: 2.68

## 2020-04-20 NOTE — PROGRESS NOTES
This encounter was conducted via Zoom .   Verbal consent was obtained. Patient's identity was verified.    -------------------------------------------------------------------------------    CC:   Medication review  Follow-up atrial fibrillation  Follow-up hyperlipidemia         HPI: 84 y.o. Patient presents to discuss the following:     Deep vein thrombosis (HCC)  This is a chronic condition.  The first occurrence in the left lower extremity in the 1970s.  The patient reported the second occurrence in the left lower extremity in 2012.  The patient has been on anticoagulation therapy Xarelto  No history of bleeding or any complication.    Rheumatoid arthritis involving multiple sites with positive rheumatoid factor (HCC)  This is a chronic and stable condition for the patient is presently followed by rheumatology service.  She is presently taking Plaquenil daily.  No side effect reported.  She takes Tylenol as needed for pain control.    Atrial fibrillation [I48.91]  This is a chronic and stable condition.  The patient is now followed by cardiology service.  She takes Xarelto on a long-term basis.  She denies any chest pain shortness of breath lightheadedness near syncope or syncope.    Malignant melanoma of skin of left leg (HCC)  This condition is now followed by dermatology service.  Patient status post surgery left lower extremity generally 2020.  She is now followed by dermatology service every 3 months.    History of colon cancer  This condition was diagnosed 2018.  The patient status post resection now stable.  Last colonoscopy August 2020 and the patient was noted to have some polyp.  GI recommend a follow-up colonoscopy 2023 per patient report.    Hyperlipidemia  This is a chronic and stable condition.  The patient is currently taking Lipitor daily.  No side effect reported.    Osteoporosis  This is a chronic and stable condition with the patient now followed by rheumatology service.  She is currently on  "Prolia injection every 6 months.              REVIEW OF SYSTEMS:  Constitutional:  no fever / chills   Neurologic: no headaches  Eyes: no changes in vision  ENT: no sore throat, no hearing loss  CV:  no chest pain, no palpitations  Pulmonary: no SOB, no cough    :  no dysuria, no hematuria       Allergies: Sulfa drugs    Current Outpatient Medications Ordered in Epic   Medication Sig Dispense Refill   • hydroxychloroquine (PLAQUENIL) 200 MG Tab 1 tab po bid 180 Tab 0   • atorvastatin (LIPITOR) 10 MG Tab Take 1 Tab by mouth every evening. 90 Tab 3   • rivaroxaban (XARELTO) 20 MG Tab tablet Take 1 Tab by mouth with dinner. 90 Tab 1   • Calcium Carbonate-Vitamin D (CALCIUM + D PO) Take 1 Tab by mouth every bedtime.     • terbinafine (LAMISIL) 250 MG Tab Take 1 Tab by mouth every day. 45 Tab 0   • lidocaine (LIDODERM) 5 % Patch Apply 1 Patch to skin as directed every 24 hours. 90 Patch 0   • LYRICA 100 MG Cap Take 100 mg by mouth 2 times a day.     • omeprazole (PRILOSEC) 20 MG delayed-release capsule Take 20 mg by mouth every day.     • Diphenhydramine-APAP, sleep, (TYLENOL PM EXTRA STRENGTH)  MG Tab Take 2 Tabs by mouth every bedtime.     • acetaminophen (TYLENOL) 500 MG Tab Take 1,000 mg by mouth 2 Times a Day.     • Melatonin 10 MG Tab Take 1 Tab by mouth every bedtime.     • vitamin D (CHOLECALCIFEROL) 1000 UNIT Tab Take 1,000 Units by mouth every morning.       Current Facility-Administered Medications Ordered in Epic   Medication Dose Route Frequency Provider Last Rate Last Dose   • denosumab (PROLIA) subq injection 60 mg  60 mg Subcutaneous Q6 MO Deanna Escoto M.D.   60 mg at 12/09/19 1058       Past Medical History:   Diagnosis Date   • Adenocarcinoma of colon (HCC) 10/30/2018    From sessile serrated polyp 10/2018   • Anesthesia     pt states \"one new dr scraped my esophagus when they put the tube in and I started bleeding so they couldn't operate\"    • Atrial fibrillation [I48.91] 4/19/2016     " pt denies    • Back pain 6/27/2012   • Blood clotting disorder (HCC) 2012    clot in leg   • Bowel habit changes     constipation    • Cancer (HCC)     skin, colon 2018   • Cataract     belia IOL    • Chronic back pain greater than 3 months duration    • Deep vein thrombosis (HCC) 3/8/2016    First occurrence in LLE in late 1970s Second occurrence further up in LLE in 2012, has been on AC since    • Essential hypertension, benign 6/27/2012   • GERD (gastroesophageal reflux disease) 6/27/2012   • Heart murmur    • Hyperlipidemia    • Hypertension     hx of, not currently    • Impaired fasting glucose 11/2/2017   • Mild aortic stenosis 11/2/2017   • Other and unspecified hyperlipidemia 6/27/2012   • Prediabetes    • Protein S deficiency (HCC) 11/2/2017    Noted in lab work 2013 as a part of work up at [facility]    • Rheumatoid arthritis involving multiple sites with positive rheumatoid factor (HCC) 03/14/2016    Dr. Escoto   • Rheumatoid nodule (HCC) 7/25/2017   • Right bundle branch block 6/27/2012    pt denies    • Urinary incontinence 11/2/2017        Past Surgical History:   Procedure Laterality Date   • HEMICOLECTOMY Right 12/13/2018    Procedure: OPEN RIGHT HEMICOLECTOMY;  Surgeon: Dash Bhagat M.D.;  Location: SURGERY [facility];  Service: General   • INGUINAL HERNIA REPAIR Right 9/23/2016    Procedure: INGUINAL HERNIA REPAIR - PRIMARY;  Surgeon: Mary Medina M.D.;  Location: SURGERY [facility];  Service:    • OTHER  08/12/2014    peroneal nerve surgery Dr. Castro    • OTHER ORTHOPEDIC SURGERY  2014    left knee partial   • OTHER ORTHOPEDIC SURGERY  2011    meniscus repair   • ATHROPLASTY      partial TKA with Dr. Persaud   • CHOLECYSTECTOMY     • HYSTERECTOMY, TOTAL ABDOMINAL  1970's   • ROTATOR CUFF REPAIR Bilateral         Family History   Problem Relation Age of Onset   • Cancer Mother         stomach   • Cancer Father         colon   • Leukemia Father         many exposure, worked  for  government    • Heart Disease Brother         s/p stent         Social History     Tobacco Use   Smoking Status Never Smoker   Smokeless Tobacco Never Used          Social History     Substance and Sexual Activity   Alcohol Use Not Currently   • Alcohol/week: 0.0 oz    Comment: occ        ---------------------------------------------------------------------  Vitals:    04/20/20 0914   BP: 113/80   Resp: 16         Physical Exam:  Psych:  A&O x 3, mood and affect appropiate   Constitutional: no distress  Skin: No rashes in visible areas.  Eye: Round. Conjunctiva clear, lids normal.   ENMT: Lips without lesions. Phonation normal.  Neck: No obvious masses visible, no thyromegaly.   Respiratory: Unlabored respiratory effort, no cough or audible wheeze    ---------------------------------------------------------------------     ASSESSMENT and PLAN:     1. Atrial fibrillation, unspecified type (HCC)  This is a chronic and stable condition.  Advised the patient continue with Xarelto.  Continue follow-up with cardiology.    2. History of colon cancer  This is a chronic and stable condition.  The patient had colonoscopy done August 2020.  The patient continue follow-up with GI service and to repeat colonoscopy as directed by GI specialist    3. Malignant melanoma of skin of left leg (HCC)  This is a chronic condition status post surgery.  Advised the patient to continue follow-up with dermatology service    4. Chronic deep vein thrombosis (DVT) of proximal vein of left lower extremity (HCC)  This is a chronic and stable condition.  Continue with Xarelto.  Continue to monitor.  No history of bleeding noted by the patient.    5. Osteoporosis, unspecified osteoporosis type, unspecified pathological fracture presence  This is a chronic and stable condition.  Continue with Prolia injection every 6 months  Continue follow-up with rheumatology service.    6. Rheumatoid arthritis involving multiple sites with positive  rheumatoid factor (HCC)  This is a chronic and stable condition.  Continue follow-up with rheumatology service.  Continue with current treatment hydroxychloroquine.    7. Hyperlipidemia, unspecified hyperlipidemia type  This is a chronic and stable condition.  Continue with Lipitor.  Lab tests ordered.  Recommend low-fat low-cholesterol low-carb diet.              Return in about 6 months (around 10/20/2020) for routine followup.       PATIENT EDUCATION:  -If any problems should arise, patient was advised to contact our office or go to ER to be evaluated.  -The assessments and plans of care were discussed with the patient. Patient verbalized understanding.  -Advised pt to follow a healthy diet and regular aerobic exercise regimen. Advised pt to avoid alcohol and tobacco use.    Please note that this dictation was created using voice recognition software. I have made every reasonable attempt to correct obvious errors, but it is possible there are errors of grammar and possibly content that I did not discover before finalizing the note.

## 2020-04-20 NOTE — ASSESSMENT & PLAN NOTE
This is a chronic and stable condition.  The patient is currently taking Lipitor daily.  No side effect reported.

## 2020-04-20 NOTE — ASSESSMENT & PLAN NOTE
This is a chronic condition.  The first occurrence in the left lower extremity in the 1970s.  The patient reported the second occurrence in the left lower extremity in 2012.  The patient has been on anticoagulation therapy Xarelto  No history of bleeding or any complication.

## 2020-04-20 NOTE — ASSESSMENT & PLAN NOTE
This condition is now followed by dermatology service.  Patient status post surgery left lower extremity generally 2020.  She is now followed by dermatology service every 3 months.

## 2020-04-20 NOTE — ASSESSMENT & PLAN NOTE
This is a chronic and stable condition.  The patient is now followed by cardiology service.  She takes Xarelto on a long-term basis.  She denies any chest pain shortness of breath lightheadedness near syncope or syncope.

## 2020-04-20 NOTE — ASSESSMENT & PLAN NOTE
This is a chronic and stable condition for the patient is presently followed by rheumatology service.  She is presently taking Plaquenil daily.  No side effect reported.  She takes Tylenol as needed for pain control.

## 2020-04-20 NOTE — ASSESSMENT & PLAN NOTE
This is a chronic and stable condition with the patient now followed by rheumatology service.  She is currently on Prolia injection every 6 months.

## 2020-04-20 NOTE — ASSESSMENT & PLAN NOTE
This condition was diagnosed 2018.  The patient status post resection now stable.  Last colonoscopy August 2020 and the patient was noted to have some polyp.  GI recommend a follow-up colonoscopy 2023 per patient report.

## 2020-04-22 ENCOUNTER — TELEPHONE (OUTPATIENT)
Dept: MEDICAL GROUP | Facility: PHYSICIAN GROUP | Age: 85
End: 2020-04-22

## 2020-04-22 PROBLEM — I35.0 MILD AORTIC STENOSIS: Chronic | Status: ACTIVE | Noted: 2017-11-02

## 2020-04-23 NOTE — TELEPHONE ENCOUNTER
PLS Call pt. The recent Echocardiogram showed:    Compared to prior echo 02/28/19, no significant change.  Pt was noted with good ejection function.  Mild left ventricular muscle wall enlargemement [hypertrophy]  Mild heart stiffness noted  Mildly dilated left atrium.  Moderate aortic valve narrowing..   Mild mitral valve insufficiency.    Recommendation: Please continue follow up with Cardiologist.  It is important to follow a low salt low fat low cholesterol diet and maintain good blood pressure control.

## 2020-05-18 DIAGNOSIS — Z79.899 ENCOUNTER FOR MONITORING DENOSUMAB THERAPY: ICD-10-CM

## 2020-05-18 DIAGNOSIS — Z51.81 ENCOUNTER FOR MONITORING DENOSUMAB THERAPY: ICD-10-CM

## 2020-05-20 DIAGNOSIS — Z79.01 CHRONIC ANTICOAGULATION: ICD-10-CM

## 2020-05-21 ENCOUNTER — HOSPITAL ENCOUNTER (OUTPATIENT)
Dept: LAB | Facility: MEDICAL CENTER | Age: 85
End: 2020-05-21
Attending: NURSE PRACTITIONER
Payer: MEDICARE

## 2020-05-21 DIAGNOSIS — Z51.81 ENCOUNTER FOR MONITORING DENOSUMAB THERAPY: ICD-10-CM

## 2020-05-21 DIAGNOSIS — Z79.899 ENCOUNTER FOR MONITORING DENOSUMAB THERAPY: ICD-10-CM

## 2020-05-21 DIAGNOSIS — B35.1 ONYCHOMYCOSIS: ICD-10-CM

## 2020-05-21 DIAGNOSIS — E78.5 HYPERLIPIDEMIA, UNSPECIFIED HYPERLIPIDEMIA TYPE: Chronic | ICD-10-CM

## 2020-05-21 LAB
ALBUMIN SERPL BCP-MCNC: 4.4 G/DL (ref 3.2–4.9)
ALBUMIN/GLOB SERPL: 1.8 G/DL
ALP SERPL-CCNC: 41 U/L (ref 30–99)
ALT SERPL-CCNC: 16 U/L (ref 2–50)
ANION GAP SERPL CALC-SCNC: 9 MMOL/L (ref 7–16)
AST SERPL-CCNC: 20 U/L (ref 12–45)
BILIRUB SERPL-MCNC: 0.6 MG/DL (ref 0.1–1.5)
BUN SERPL-MCNC: 29 MG/DL (ref 8–22)
CALCIUM SERPL-MCNC: 8.9 MG/DL (ref 8.5–10.5)
CHLORIDE SERPL-SCNC: 106 MMOL/L (ref 96–112)
CHOLEST SERPL-MCNC: 192 MG/DL (ref 100–199)
CO2 SERPL-SCNC: 27 MMOL/L (ref 20–33)
CREAT SERPL-MCNC: 0.72 MG/DL (ref 0.5–1.4)
FASTING STATUS PATIENT QL REPORTED: NORMAL
GLOBULIN SER CALC-MCNC: 2.5 G/DL (ref 1.9–3.5)
GLUCOSE SERPL-MCNC: 91 MG/DL (ref 65–99)
HDLC SERPL-MCNC: 70 MG/DL
LDLC SERPL CALC-MCNC: 110 MG/DL
POTASSIUM SERPL-SCNC: 4.1 MMOL/L (ref 3.6–5.5)
PROT SERPL-MCNC: 6.9 G/DL (ref 6–8.2)
SODIUM SERPL-SCNC: 142 MMOL/L (ref 135–145)
TRIGL SERPL-MCNC: 60 MG/DL (ref 0–149)

## 2020-05-21 PROCEDURE — 36415 COLL VENOUS BLD VENIPUNCTURE: CPT

## 2020-05-21 PROCEDURE — 80053 COMPREHEN METABOLIC PANEL: CPT

## 2020-05-21 PROCEDURE — 80061 LIPID PANEL: CPT

## 2020-05-26 ENCOUNTER — HOSPITAL ENCOUNTER (OUTPATIENT)
Dept: RADIOLOGY | Facility: MEDICAL CENTER | Age: 85
End: 2020-05-26
Attending: PHYSICAL MEDICINE & REHABILITATION
Payer: MEDICARE

## 2020-05-26 DIAGNOSIS — M47.816 LUMBAR SPONDYLOSIS: ICD-10-CM

## 2020-05-26 PROCEDURE — 72148 MRI LUMBAR SPINE W/O DYE: CPT

## 2020-06-01 ENCOUNTER — ANTICOAGULATION VISIT (OUTPATIENT)
Dept: MEDICAL GROUP | Facility: PHYSICIAN GROUP | Age: 85
End: 2020-06-01
Payer: MEDICARE

## 2020-06-01 DIAGNOSIS — I82.5Y2 CHRONIC DEEP VEIN THROMBOSIS (DVT) OF PROXIMAL VEIN OF LEFT LOWER EXTREMITY (HCC): ICD-10-CM

## 2020-06-01 DIAGNOSIS — I48.91 ATRIAL FIBRILLATION, UNSPECIFIED TYPE (HCC): ICD-10-CM

## 2020-06-01 DIAGNOSIS — D68.59 PROTEIN S DEFICIENCY (HCC): ICD-10-CM

## 2020-06-01 DIAGNOSIS — Z79.01 LONG TERM (CURRENT) USE OF ANTICOAGULANTS: Primary | ICD-10-CM

## 2020-06-01 PROCEDURE — 99441 PR PHYSICIAN TELEPHONE EVALUATION 5-10 MIN: CPT | Mod: CR | Performed by: NURSE PRACTITIONER

## 2020-06-01 NOTE — PROGRESS NOTES
Telephone Appointment Visit   As a means of avoiding spread of COVID-19, this visit is being conducted by telephone. This telephone visit was initiated by the patient and they verbally consented.    Time at start of call: 9:47am    Reason for Call:  Anticoagulation Follow Up - DOAC    HPI:    HPI:   Marry Mathur seen in clinic today, on anticoagulation therapy with Xarelto for VTE and stroke prevention due to history of DVT and atrial fibrillation.    Target end date:Indefinite     Indication: DVT and A-fib     Drug: Xarelto 20mg     CHADsVASC = at least 6 (age, female, VTE, HTN)    Health Status Since Last Assessment   Patient denies any new relevant medical problems, ED visits or hospitalizations   Patient denies any embolic events (stroke/tia/systemic embolism)    Adherence with DOAC Therapy   Pt has NO missed any doses in the average week      Labs / Images Reviewed:     Lab Results   Component Value Date/Time    SODIUM 142 05/21/2020 08:22 AM    POTASSIUM 4.1 05/21/2020 08:22 AM    CHLORIDE 106 05/21/2020 08:22 AM    CO2 27 05/21/2020 08:22 AM    GLUCOSE 91 05/21/2020 08:22 AM    BUN 29 (H) 05/21/2020 08:22 AM    CREATININE 0.72 05/21/2020 08:22 AM      Lab Results   Component Value Date/Time    WBC 5.2 03/03/2020 12:42 PM    RBC 4.74 03/03/2020 12:42 PM    HEMOGLOBIN 14.5 03/03/2020 12:42 PM    HEMATOCRIT 44.6 03/03/2020 12:42 PM    MCV 94.1 03/03/2020 12:42 PM    MCH 30.6 03/03/2020 12:42 PM    MCHC 32.5 (L) 03/03/2020 12:42 PM    MPV 11.7 03/03/2020 12:42 PM    NEUTSPOLYS 67.30 03/03/2020 12:42 PM    LYMPHOCYTES 21.40 (L) 03/03/2020 12:42 PM    MONOCYTES 8.60 03/03/2020 12:42 PM    EOSINOPHILS 1.70 03/03/2020 12:42 PM    BASOPHILS 0.80 03/03/2020 12:42 PM           Assessment and Plan:     1. Atrial fibrillation, unspecified type (HCC)    2. Chronic deep vein thrombosis (DVT) of proximal vein of left lower extremity (HCC)    3. Long term (current) use of anticoagulants  Bleeding Risk  Assessment     Denies Epistaxis   Pt denies any excessive or unusual bleeding/hematomas.  Pt denies any GI bleeds or hematemesis.  Pt denies any concerning daily headache or sub dural hematoma symptoms.     Pt denies any hematuria or abnormal vaginal bleeding.   Latest Hemoglobin 14.5   ETOH overuse Denies     Creatinine Clearance/Renal Function     Latest ClCr >50 mL/min    Hepatic function   Latest LFTs WNL   Pt denies any history of liver dysfunction      Drug Interactions   Platelets: 161   ASA/other antiplatelets Negative   NSAID Negative   Other drug interactions Negative   X Verified no anticonvulsant or azole therapy, education provided for future use.    Final Assessment and Recommendations:   Patient appears stable from the anticoagulation staindpoint.     Benefits of continued DOAC therapy outweigh risks for this patient   Recommend pt continue with current DOAC therapy Xarelto 20mg daily (with dose adjustment)     Other Actions: cmp/ cbc hemogram ordered prior to next visit      Follow-up: Six months    Time at end of call: 9:54am  Total Time Spent: 5-10 minutes    Steve Hahn, PerlitaD, BCACP

## 2020-06-02 ENCOUNTER — HOSPITAL ENCOUNTER (OUTPATIENT)
Dept: RADIOLOGY | Facility: MEDICAL CENTER | Age: 85
End: 2020-06-02
Attending: INTERNAL MEDICINE
Payer: MEDICARE

## 2020-06-02 DIAGNOSIS — M81.0 OSTEOPOROSIS WITHOUT CURRENT PATHOLOGICAL FRACTURE, UNSPECIFIED OSTEOPOROSIS TYPE: ICD-10-CM

## 2020-06-02 DIAGNOSIS — M05.79 RHEUMATOID ARTHRITIS INVOLVING MULTIPLE SITES WITH POSITIVE RHEUMATOID FACTOR (HCC): ICD-10-CM

## 2020-06-02 DIAGNOSIS — Z79.899 LONG-TERM USE OF PLAQUENIL: ICD-10-CM

## 2020-06-02 PROCEDURE — 77080 DXA BONE DENSITY AXIAL: CPT

## 2020-06-09 ENCOUNTER — APPOINTMENT (RX ONLY)
Dept: URBAN - METROPOLITAN AREA CLINIC 4 | Facility: CLINIC | Age: 85
Setting detail: DERMATOLOGY
End: 2020-06-09

## 2020-06-09 DIAGNOSIS — D18.0 HEMANGIOMA: ICD-10-CM

## 2020-06-09 DIAGNOSIS — L82.0 INFLAMED SEBORRHEIC KERATOSIS: ICD-10-CM

## 2020-06-09 DIAGNOSIS — L81.4 OTHER MELANIN HYPERPIGMENTATION: ICD-10-CM

## 2020-06-09 DIAGNOSIS — D22 MELANOCYTIC NEVI: ICD-10-CM

## 2020-06-09 DIAGNOSIS — Z85.820 PERSONAL HISTORY OF MALIGNANT MELANOMA OF SKIN: ICD-10-CM

## 2020-06-09 DIAGNOSIS — L57.0 ACTINIC KERATOSIS: ICD-10-CM

## 2020-06-09 DIAGNOSIS — L82.1 OTHER SEBORRHEIC KERATOSIS: ICD-10-CM

## 2020-06-09 DIAGNOSIS — L57.8 OTHER SKIN CHANGES DUE TO CHRONIC EXPOSURE TO NONIONIZING RADIATION: ICD-10-CM

## 2020-06-09 PROBLEM — D22.5 MELANOCYTIC NEVI OF TRUNK: Status: ACTIVE | Noted: 2020-06-09

## 2020-06-09 PROBLEM — D18.01 HEMANGIOMA OF SKIN AND SUBCUTANEOUS TISSUE: Status: ACTIVE | Noted: 2020-06-09

## 2020-06-09 PROCEDURE — ? COUNSELING

## 2020-06-09 PROCEDURE — 17110 DESTRUCTION B9 LES UP TO 14: CPT

## 2020-06-09 PROCEDURE — ? LIQUID NITROGEN

## 2020-06-09 PROCEDURE — 17000 DESTRUCT PREMALG LESION: CPT | Mod: 59

## 2020-06-09 PROCEDURE — 17003 DESTRUCT PREMALG LES 2-14: CPT | Mod: 59

## 2020-06-09 PROCEDURE — 99213 OFFICE O/P EST LOW 20 MIN: CPT | Mod: 25

## 2020-06-09 ASSESSMENT — LOCATION SIMPLE DESCRIPTION DERM
LOCATION SIMPLE: RIGHT HAND
LOCATION SIMPLE: LEFT UPPER EXTREMITY
LOCATION SIMPLE: LEFT FOREARM
LOCATION SIMPLE: NOSE
LOCATION SIMPLE: RIGHT UPPER EXTREMITY
LOCATION SIMPLE: RIGHT CHEEK
LOCATION SIMPLE: LEFT CHEEK
LOCATION SIMPLE: LEFT LOWER EXTREMITY
LOCATION SIMPLE: SCALP
LOCATION SIMPLE: RIGHT LOWER EXTREMITY
LOCATION SIMPLE: LEFT HAND
LOCATION SIMPLE: LEFT OCCIPITAL SCALP
LOCATION SIMPLE: BACK
LOCATION SIMPLE: RIGHT OCCIPITAL SCALP

## 2020-06-09 ASSESSMENT — LOCATION DETAILED DESCRIPTION DERM
LOCATION DETAILED: NASAL DORSUM
LOCATION DETAILED: LEFT UPPER BACK
LOCATION DETAILED: LEFT SUPERIOR OCCIPITAL SCALP
LOCATION DETAILED: RIGHT CHEEK
LOCATION DETAILED: RIGHT ANTERIOR THIGH
LOCATION DETAILED: RIGHT RADIAL DORSAL HAND
LOCATION DETAILED: LEFT CENTRAL MALAR CHEEK
LOCATION DETAILED: LEFT PROXIMAL DORSAL FOREARM
LOCATION DETAILED: LEFT DORSAL HAND
LOCATION DETAILED: RIGHT ULNAR DORSAL HAND
LOCATION DETAILED: LEFT DORSAL FOREARM
LOCATION DETAILED: LEFT SUPERIOR PARIETAL SCALP
LOCATION DETAILED: RIGHT DORSAL FOREARM
LOCATION DETAILED: RIGHT SUPERIOR OCCIPITAL SCALP
LOCATION DETAILED: LEFT CHEEK
LOCATION DETAILED: LEFT ANTERIOR THIGH
LOCATION DETAILED: RIGHT UPPER BACK
LOCATION DETAILED: RIGHT DORSAL HAND
LOCATION DETAILED: LEFT MID BACK

## 2020-06-09 ASSESSMENT — LOCATION ZONE DERM
LOCATION ZONE: SCALP
LOCATION ZONE: FACE
LOCATION ZONE: TRUNK
LOCATION ZONE: LEG
LOCATION ZONE: HAND
LOCATION ZONE: ARM
LOCATION ZONE: NOSE

## 2020-06-09 NOTE — PROCEDURE: MIPS QUALITY
Quality 138: Melanoma: Coordination Of Care: A treatment plan was communicated to the physicians providing continuing care within one month of diagnosis outlining: diagnosis, tumor thickness and a plan for surgery or alternate care.
Quality 111:Pneumonia Vaccination Status For Older Adults: Pneumococcal Vaccination Previously Received
Quality 130: Documentation Of Current Medications In The Medical Record: Current Medications Documented
Quality 137: Melanoma: Continuity Of Care - Recall System: Patient information entered into a recall system that includes: target date for the next exam specified AND a process to follow up with patients regarding missed or unscheduled appointments
Quality 226: Preventive Care And Screening: Tobacco Use: Screening And Cessation Intervention: Patient screened for tobacco use and is an ex/non-smoker
Detail Level: Detailed

## 2020-06-09 NOTE — PROCEDURE: LIQUID NITROGEN
Render Note In Bullet Format When Appropriate: No
Number Of Freeze-Thaw Cycles: 2 freeze-thaw cycles
Detail Level: Simple
Consent: The patient's consent was obtained including but not limited to risks of crusting, scabbing, blistering, scarring, darker or lighter pigmentary change, recurrence, incomplete removal and infection.
Aperture Size (Optional): C
Post-Care Instructions: I reviewed with the patient in detail post-care instructions. Patient is to wear sunprotection, and avoid picking at any of the treated lesions. Pt may apply Vaseline to crusted or scabbing areas.
Duration Of Freeze Thaw-Cycle (Seconds): 3
Number Of Freeze-Thaw Cycles: 1 freeze-thaw cycle
Medical Necessity Clause: This procedure was medically necessary because the lesions that were treated were:
Medical Necessity Information: It is in your best interest to select a reason for this procedure from the list below. All of these items fulfill various CMS LCD requirements except the new and changing color options.
Detail Level: Detailed

## 2020-06-11 ENCOUNTER — OFFICE VISIT (OUTPATIENT)
Dept: RHEUMATOLOGY | Facility: MEDICAL CENTER | Age: 85
End: 2020-06-11
Payer: MEDICARE

## 2020-06-11 VITALS
WEIGHT: 168 LBS | DIASTOLIC BLOOD PRESSURE: 88 MMHG | HEART RATE: 66 BPM | OXYGEN SATURATION: 94 % | BODY MASS INDEX: 28.84 KG/M2 | SYSTOLIC BLOOD PRESSURE: 140 MMHG | RESPIRATION RATE: 14 BRPM | TEMPERATURE: 97.8 F

## 2020-06-11 DIAGNOSIS — Z79.01 CHRONIC ANTICOAGULATION: ICD-10-CM

## 2020-06-11 DIAGNOSIS — Z79.899 ENCOUNTER FOR MONITORING DENOSUMAB THERAPY: ICD-10-CM

## 2020-06-11 DIAGNOSIS — Z51.81 ENCOUNTER FOR MONITORING DENOSUMAB THERAPY: ICD-10-CM

## 2020-06-11 DIAGNOSIS — M15.9 PRIMARY OSTEOARTHRITIS INVOLVING MULTIPLE JOINTS: ICD-10-CM

## 2020-06-11 DIAGNOSIS — I10 ESSENTIAL HYPERTENSION, BENIGN: ICD-10-CM

## 2020-06-11 DIAGNOSIS — M65.312 TRIGGER THUMB OF LEFT HAND: ICD-10-CM

## 2020-06-11 DIAGNOSIS — C18.9 ADENOCARCINOMA OF COLON (HCC): ICD-10-CM

## 2020-06-11 DIAGNOSIS — Z79.899 LONG-TERM USE OF PLAQUENIL: ICD-10-CM

## 2020-06-11 DIAGNOSIS — M81.0 OSTEOPOROSIS WITHOUT CURRENT PATHOLOGICAL FRACTURE, UNSPECIFIED OSTEOPOROSIS TYPE: ICD-10-CM

## 2020-06-11 DIAGNOSIS — D68.59 PROTEIN S DEFICIENCY (HCC): ICD-10-CM

## 2020-06-11 PROCEDURE — 99214 OFFICE O/P EST MOD 30 MIN: CPT | Mod: 25 | Performed by: INTERNAL MEDICINE

## 2020-06-11 PROCEDURE — 20551 NJX 1 TENDON ORIGIN/INSJ: CPT | Mod: F5 | Performed by: INTERNAL MEDICINE

## 2020-06-11 PROCEDURE — 96372 THER/PROPH/DIAG INJ SC/IM: CPT | Mod: XS | Performed by: INTERNAL MEDICINE

## 2020-06-11 RX ORDER — METHYLPREDNISOLONE ACETATE 40 MG/ML
10 INJECTION, SUSPENSION INTRA-ARTICULAR; INTRALESIONAL; INTRAMUSCULAR; SOFT TISSUE ONCE
OUTPATIENT
Start: 2020-06-11 | End: 2020-06-12

## 2020-06-11 ASSESSMENT — FIBROSIS 4 INDEX: FIB4 SCORE: 2.58

## 2020-06-11 NOTE — PROGRESS NOTES
Chief Complaint- joint pain     Subjective:   Marry Mathur is a 84 y.o. female here today for follow up of rheumatological issues    This is a follow-up visit for this patient who we see in this clinic for osteoarthritis, there is a remote history of rheumatoid arthritis in the past with negative serologies and negative x-rays.  Patient is currently on Plaquenil at 200 mg p.o. twice daily as patient is not able to take NSAIDs because of chronic anticoagulation secondary to a protein S deficiency.   Patient denies any side effects from the medication, denies any unexplained weight loss, denies any fevers of unknown etiology, denies any GI upset, denies any rashes, denies any new joint swelling, denies recurrent infections.  Patient states her last ophthalmology evaluation was March 2020.    Patient today is complaining of a right trigger thumb, wonders about a corticosteroid injection today    Patient also has low back issues scheduled for an epidural through Dr. Cooper     Other issues include the development of leg length discrepancy status post left CARLYLE with left leg longer than the right.  Patient just recently got shoes to help adjust her leg length.      Other issues include a finding of osteoporosis on patient's bone density scan from March 2018, patient was not able to tolerate Fosamax because of a history of Araya's esophagus, patient currently on Prolia 60 mg subcu every 6 months with benefit.        Additional comorbidities include diabetes for which patient  takes metformin, also with a diagnosis of spinal stenosis with intermittent weakness in her legs with progressive numbness and weakness in the left leg.  Patient also with protein S deficiency with a history of DVT on chronic anticoagulation.       Left TKA   Left CARLYLE      S/p gabapentin -ineffective   S/p Remicade-stopped because of hx of melanoma about 1996 and has multiple other skin cancers  S/p Orencia-lost efficacy  S/p  Humira-stopped because of recurrence of melanoma October 2017  S/p MTX-stopped because of development of skin cancer/melanoma October 2017  S/p NSAIDS-relatively contraindicated as patient is on chronic anticoagulation   S/p xeljanz-stopped because of the development of colonic adenocarcinoma  S/p fosamax-unable to tolerate-GI upset      G6PD 13.6 adequate 5/2019  DEXA 3/18/2016 T scores 1.1, -1.1   FRAX 3/18/2016 major osteoporotic fracture risk 14.3%, hip fracture risks 3.7%  DEXA 3/23/2018 T scores 0.2, -1.4  FRAX 3/23/2018 major osteoporotic fracture risk 19.3%, hip fracture risk 6.1%  DEXA 6/2/2020 T scores 0.7, -1.4  FRAX 6/2/2020 major osteoporotic fracture risk 17.2%, hip fracture risk 4.9%  Prolia started 6/2019  Echocardiogram 7/2012 [facility]  RF neg 3/2014 [facility]; RF neg 6/2014 [facility]; RF neg 6/2016  CCP neg 3/2014 [facility]; CCP neg 6/2014 [facility]; CCP neg 6/2016  Uric acid 4.7 3/2014 [facility];Uric acid 4.5 6/2016  Hep B neg 6/2016  Quantiferon Gold neg 6/2014 [facility]; Quantiferon Gold neg 6/2016  Hand x-rays 3/2016-indicate osteoarthritis  Feet x-rays 3/2016-indicate osteoarthritis     Corticosteroid Therapy Informed Consent signed 1/17/2019-copy given to patient           Current medicines (including changes today)  Current Outpatient Medications   Medication Sig Dispense Refill   • lidocaine (LIDODERM) 5 % Patch Apply 1 Patch to skin as directed every 24 hours. 90 Patch 0   • rivaroxaban (XARELTO) 20 MG Tab tablet Take 1 Tab by mouth with dinner. 90 Tab 1   • hydroxychloroquine (PLAQUENIL) 200 MG Tab 1 tab po bid 180 Tab 0   • atorvastatin (LIPITOR) 10 MG Tab Take 1 Tab by mouth every evening. 90 Tab 3   • LYRICA 100 MG Cap Take 100 mg by mouth 2 times a day.     • omeprazole (PRILOSEC) 20 MG delayed-release capsule Take 20 mg by mouth every day.     • Diphenhydramine-APAP, sleep, (TYLENOL PM EXTRA STRENGTH)  MG Tab Take 2 Tabs by mouth every bedtime.     •  acetaminophen (TYLENOL) 500 MG Tab Take 1,000 mg by mouth 2 Times a Day.     • Melatonin 10 MG Tab Take 1 Tab by mouth every bedtime.     • vitamin D (CHOLECALCIFEROL) 1000 UNIT Tab Take 1,000 Units by mouth every morning.     • Calcium Carbonate-Vitamin D (CALCIUM + D PO) Take 1 Tab by mouth every bedtime.     • terbinafine (LAMISIL) 250 MG Tab Take 1 Tab by mouth every day. 45 Tab 0     Current Facility-Administered Medications   Medication Dose Route Frequency Provider Last Rate Last Dose   • methylPREDNISolone acetate (DEPO-MEDROL) injection 10 mg  10 mg Other Once Deanna Escoto M.D.       • denosumab (PROLIA) subq injection 60 mg  60 mg Subcutaneous Q6 MO Deanna Escoto M.D.   60 mg at 06/11/20 1129     She  has a past medical history of Adenocarcinoma of colon ([facility]) (10/30/2018), Anesthesia, Atrial fibrillation [I48.91] (4/19/2016), Back pain (6/27/2012), Blood clotting disorder ([facility]) (2012), Bowel habit changes, Cancer ([facility]), Cataract, Chronic back pain greater than 3 months duration, Deep vein thrombosis ([facility]) (3/8/2016), Essential hypertension, benign (6/27/2012), GERD (gastroesophageal reflux disease) (6/27/2012), Heart murmur, Hyperlipidemia, Hypertension, Impaired fasting glucose (11/2/2017), Mild aortic stenosis (11/2/2017), Other and unspecified hyperlipidemia (6/27/2012), Prediabetes, Protein S deficiency ([facility]) (11/2/2017), Rheumatoid arthritis involving multiple sites with positive rheumatoid factor ([facility]) (03/14/2016), Rheumatoid nodule ([facility]) (7/25/2017), Right bundle branch block (6/27/2012), and Urinary incontinence (11/2/2017).    ROS   Other than what is mentioned in HPI or physical exam, there is no history of headaches, double vision or blurred vision. No temporal tenderness or jaw claudication. No trouble swallowing difficulties or sore throats.  No chest complaints including chest pain, cough or sputum production. No GI complaints including nausea, vomiting, change in bowel  habits, or past peptic ulcer disease. No history of blood in the stools. No urinary complaints including dysuria or frequency. No history of alopecia, photosensitivity, oral ulcerations, Raynaud's phenomena.       Objective:     /88   Pulse 66   Temp 36.6 °C (97.8 °F) (Temporal)   Resp 14   Wt 76.2 kg (168 lb)   SpO2 94%  Body mass index is 28.84 kg/m².   Physical Exam:    Constitutional: Alert and oriented X3, patient is talkative with good eye contact.Skin: Warm, dry, good turgor, no rashes in visible areas.Eye: Equal, round and reactive, conjunctiva clear, lids normal EOM intactENMT: Lips without lesions, good dentition, no oropharyngeal ulcers, moist buccal mucosa, pinna without deformityNeck: Trachea midline, no masses, no thyromegaly.Lymph:  No cervical lymphadenopathy, no axillary lymphadenopathy, no supraclavicular lymphadenopathyRespiratory: Unlabored respiratory effort, lungs clear to auscultation, no wheezes, no ronchi.Cardiovascular: Normal S1, S2, no murmur, no edema.Abdomen: Soft, non-tender, no masses, no hepatosplenomegaly.Psych: Alert and oriented x3, normal affect and mood.Neuro: Cranial nerves 2-12 are grossly intact, no loss of sensation LEExt:no joint laxity noted in bilateral arms, no joint laxity noted in bilateral legs extensive Heberden's and Ana's nodes on both hands, patient does have a right trigger thumb catching at the palmar aspect of the DIP joint, toes without splay toes without crossover toes, hands without any christin swan-neck or boutonniere deformities, there is a well-healed left TKA incision site    Lab Results   Component Value Date/Time    QNTTBGOLD Negative 06/29/2016 02:03 PM     Lab Results   Component Value Date/Time    HEPBCORIGM Negative 06/29/2016 02:03 PM    HEPBSAG Negative 06/29/2016 02:03 PM     No results found for: HEPCAB  Lab Results   Component Value Date/Time    SODIUM 142 05/21/2020 08:22 AM    POTASSIUM 4.1 05/21/2020 08:22 AM    CHLORIDE 106  05/21/2020 08:22 AM    CO2 27 05/21/2020 08:22 AM    GLUCOSE 91 05/21/2020 08:22 AM    BUN 29 (H) 05/21/2020 08:22 AM    CREATININE 0.72 05/21/2020 08:22 AM      Lab Results   Component Value Date/Time    WBC 5.2 03/03/2020 12:42 PM    RBC 4.74 03/03/2020 12:42 PM    HEMOGLOBIN 14.5 03/03/2020 12:42 PM    HEMATOCRIT 44.6 03/03/2020 12:42 PM    MCV 94.1 03/03/2020 12:42 PM    MCH 30.6 03/03/2020 12:42 PM    MCHC 32.5 (L) 03/03/2020 12:42 PM    MPV 11.7 03/03/2020 12:42 PM    NEUTSPOLYS 67.30 03/03/2020 12:42 PM    LYMPHOCYTES 21.40 (L) 03/03/2020 12:42 PM    MONOCYTES 8.60 03/03/2020 12:42 PM    EOSINOPHILS 1.70 03/03/2020 12:42 PM    BASOPHILS 0.80 03/03/2020 12:42 PM      Lab Results   Component Value Date/Time    CALCIUM 8.9 05/21/2020 08:22 AM    ASTSGOT 20 05/21/2020 08:22 AM    ALTSGPT 16 05/21/2020 08:22 AM    ALKPHOSPHAT 41 05/21/2020 08:22 AM    TBILIRUBIN 0.6 05/21/2020 08:22 AM    ALBUMIN 4.4 05/21/2020 08:22 AM    TOTPROTEIN 6.9 05/21/2020 08:22 AM     Lab Results   Component Value Date/Time    URICACID 4.5 06/29/2016 02:03 PM    RHEUMFACTN <10 06/29/2016 02:03 PM    CCPANTIBODY 4 06/29/2016 02:03 PM     Lab Results   Component Value Date/Time    SEDRATEWES 11 07/07/2018 11:25 AM     Lab Results   Component Value Date/Time    HBA1C 5.5 05/16/2016 07:50 AM     Lab Results   Component Value Date/Time    G6PD 13.6 05/20/2019 10:01 AM     Lab Results   Component Value Date/Time    CPKTOTAL 61 06/29/2016 02:03 PM     Results for orders placed during the hospital encounter of 03/18/16   DX-JOINT SURVEY-HANDS SINGLE VIEW    Impression Multiple bilateral sites of osteoarthritis     Results for orders placed during the hospital encounter of 03/18/16   DX-JOINT SURVEY-FEET SINGLE VIEW    Impression 1.  Bilateral midfoot and forefoot osteoarthritis    2.  Bilateral bunion    3.  Prior fusion across the right 2nd proximal interphalangeal joint    4.  Foreign body or opaque material between 1st and 2nd phalanges      Results for orders placed during the hospital encounter of 06/02/20   DS-BONE DENSITY STUDY (DEXA)    Impression According to the World Health Organization classification, bone mineral density of this patient is osteopenic in the right femur, within normal limits of the lumbar spine and there has been no significant interval change.    10-year Probability of Fracture:  Major Osteoporotic     17.2%  Hip     4.9%  Population      [place] ()    Based on right femur neck BMD          INTERPRETING LOCATION:  [address] [place] [address], [facility], [id number]     Results for orders placed during the hospital encounter of 01/14/09   DX-KNEE COMPLETE 4+    Impression IMPRESSION:     MILD PATELLOFEMORAL AND MEDIAL FEMOROTIBIAL COMPARTMENT DEGENERATIVE   OSTEOARTHROSIS.        GEK:leonie     Read By ALEX WISE MD on Jan 14 2009 10:01AM  : DANA Transcription Date: Jovi 15 2009  8:11AM  THIS DOCUMENT HAS BEEN ELECTRONICALLY SIGNED BY: ALEX WISE MD on   Jan 16 2009  1:32PM        Results for orders placed during the hospital encounter of 01/14/09   DX-SHOULDER 2+    Impression IMPRESSION:     NORMAL RADIOGRAPHS OF THE LEFT SHOULDER WITH NOTE MADE OF MINIMAL   DEGENERATIVE OSTEOARTHROSIS OF THE GLENOHUMERAL JOINT WITH MINIMAL   SPURRING.           Results for orders placed during the hospital encounter of 05/26/20   MR-LUMBAR SPINE-W/O    Impression Interval worsening L3/4 central protrusion results in worsening moderate central stenosis    Otherwise stable multilevel degenerative change resulting in foraminal predominate stenoses, greatest is moderate to severe on the right at L3/4.    Lesser stenoses at other levels as detailed above    Stable L4/5 grade 1 anterolisthesis of secondary to severe facet arthropathy. Stable minimal degenerative retrolisthesis of the upper lumbar levels    Mature L5/S1 interbody fusion     Results for orders placed during the hospital encounter of 10/08/18   MR-KNEE-W/O LEFT     Impression Status post medial knee hemiarthroplasty without complication identified    Mild patellofemoral and lateral femorotibial osteoarthritis with some cartilage thinning and spurring but no full-thickness defects or areas of marrow edema are identified     Results for orders placed during the hospital encounter of 01/14/09   DX-CERVICAL SPINE-2 OR 3 VIEWS    Impression IMPRESSION:     DEGENERATIVE DISC AND FACET ARTHROPATHY C5-6, C6-7, AND DEGENERATIVE   FACET ARTHROPATHY AT C7-T1.           Assessment and Plan:     1. Primary osteoarthritis involving multiple joints  Continue Plaquenil 200 mg p.o. twice daily, patient cannot take NSAIDs because of chronic anticoagulation  - Comp Metabolic Panel; Future  - CBC WITH DIFFERENTIAL; Future  - Sed Rate; Future    2. Long-term use of Plaquenil  On Plaquenil 200 mg p.o. twice daily, of note G6PD levels are adequate, patient needs ophthalmology evaluation every year, last ophthalmology evaluation March 2020, next ophthalmology evaluation due March 2021  Patient will need CBC every 6 months, next labs will be due about September 2020, labs ordered for patient  - Comp Metabolic Panel; Future  - CBC WITH DIFFERENTIAL; Future  - Sed Rate; Future    3. Trigger thumb of left hand  Today we will do a right thumb trigger thumb corticosteroid injection  - methylPREDNISolone acetate (DEPO-MEDROL) injection 10 mg    4. Osteoporosis without current pathological fracture, unspecified osteoporosis type  Last DEXA about June 2020, next DEXA due June 2022.  Only on Prolia 60 mg subcu every 6 months, patient due for Prolia today   continue calcium citrate 1200 mg by mouth daily and vitamin D about 2000 units by mouth daily and magnesium 200 mg by mouth daily    5. Encounter for monitoring denosumab therapy  Patient gets Prolia 60 mg subcu every 6 months, patient due for Prolia today  Patient needs CMP to monitor calcium levels and renal functions within 2 weeks of each Prolia shot,  patient states understanding    6. Adenocarcinoma of colon (HCC)  Status post resection, stable    7. Chronic anticoagulation  This will impact with kind of medications we can use for this patient's arthritis, NSAIDs are contraindicated because of increased risk of bleeding while on chronic anticoagulation    8. Protein S deficiency (HCC)  On chronic anticoagulation  This will impact with kind of medications we can use for this patient's arthritis, NSAIDs are contraindicated because of increased risk of bleeding while on chronic anticoagulation    9. Essential hypertension, benign  May impact the type of medications we can use for this patient's arthritis. We will have to keep this under advisement.    Procedure: Right thumb trigger thumb corticosteroid injection    The procedure was explained to the patient in detail including potential damage to area being injected including risk of avascular necrosis, all questions were answered, verbal consent to do procedure was obtained. Risks and benefits were reviewed with patient and patient states understanding including risks of steroid injections including bleeding, infections, subcutaneous lipolysis and/or hypopigmentation at site of injection, and risk of avascular necrosis. Verbal consent was again obtained. The patient was positioned appropriately. Anatomical landmarks were identified.    Right thumb palmar aspect at base of PIP joint was prepped with betadine x 3, local anesthetic with ethyl chloride and 1% Xylocaine lot #[id number], Exp April 2023 and using sterile technique,  injected 10 mg of Depomedrol Lot #[id number], Exp 2021 palmar aspect approach base of right thumb PIP joint    EBL 0  The patient tolerated the procedure well, was observed in the office and there were no complications     Patient was asked to rest right thumb for 3 days, patient states understanding and states will comply.    Followup: Return in about 6 months (around 12/11/2020). or sooner  jose eduardo Caldwellariana Mathur  was seen 30 minutes face-to-face of which more than 50% of the time was spent counseling the patient (excluding time for procedures)  regarding  rheumatological condition and care. Therapy was discussed in detail.      Please note that this dictation was created using voice recognition software. I have made every reasonable attempt to correct obvious errors, but I expect that there are errors of grammar and possibly content that I did not discover before finalizing the note.

## 2020-06-11 NOTE — PROGRESS NOTES
Marry Mathur is a 84 y.o. female here for a non-provider visit for Prolia 60mg injection.    Reason for injection: Osteoporosis  Order in MAR?: Yes  Patient supplied?:No  Minimum interval has been met for this injection (per MAR order): Yes    Order and dose verified by: dr garcia  Patient tolerated injection and no adverse effects were observed or reported: Yes    # of Administrations remaining in MAR: 3

## 2020-06-23 NOTE — NON-PROVIDER
PAT note: PAT call made 06/23/2020 at 1237. Spoke with Marry. Health history, allergies, medications and pre-procedure/sedation instructions reviewed with patient. Pre-procedure respiratory screening completed. Pt denies being sick/symptomatic(fever, cough, shortness of breath)  within 72 hours or having been in close contact with symptomatic individuals in the past 2 weeks.Pt was informed to contact her physician should she or any close personal contact become symptomatic prior to the procedure. Pt was told to bring a mask as she would be wearing it during the entire stay. It was recommended that the pt self isolate until the procedure and that her  would have to remain on site in vehicle til pt is d/yareli. Pt verbalized understanding of instructions.

## 2020-06-26 ENCOUNTER — HOSPITAL ENCOUNTER (OUTPATIENT)
Dept: RADIOLOGY | Facility: REHABILITATION | Age: 85
End: 2020-06-26
Attending: PHYSICAL MEDICINE & REHABILITATION
Payer: MEDICARE

## 2020-06-26 ENCOUNTER — HOSPITAL ENCOUNTER (OUTPATIENT)
Dept: PAIN MANAGEMENT | Facility: REHABILITATION | Age: 85
End: 2020-06-26
Attending: PHYSICAL MEDICINE & REHABILITATION
Payer: MEDICARE

## 2020-06-26 VITALS
DIASTOLIC BLOOD PRESSURE: 68 MMHG | HEIGHT: 63 IN | BODY MASS INDEX: 29.69 KG/M2 | WEIGHT: 167.55 LBS | TEMPERATURE: 99.2 F | OXYGEN SATURATION: 95 % | SYSTOLIC BLOOD PRESSURE: 111 MMHG | HEART RATE: 65 BPM | RESPIRATION RATE: 16 BRPM

## 2020-06-26 PROCEDURE — 62323 NJX INTERLAMINAR LMBR/SAC: CPT

## 2020-06-26 PROCEDURE — 700117 HCHG RX CONTRAST REV CODE 255

## 2020-06-26 PROCEDURE — 700111 HCHG RX REV CODE 636 W/ 250 OVERRIDE (IP)

## 2020-06-26 PROCEDURE — 700101 HCHG RX REV CODE 250

## 2020-06-26 RX ORDER — METHYLPREDNISOLONE ACETATE 80 MG/ML
INJECTION, SUSPENSION INTRA-ARTICULAR; INTRALESIONAL; INTRAMUSCULAR; SOFT TISSUE
Status: COMPLETED
Start: 2020-06-26 | End: 2020-06-26

## 2020-06-26 RX ORDER — LIDOCAINE HYDROCHLORIDE 20 MG/ML
INJECTION, SOLUTION EPIDURAL; INFILTRATION; INTRACAUDAL; PERINEURAL
Status: COMPLETED
Start: 2020-06-26 | End: 2020-06-26

## 2020-06-26 RX ADMIN — IOHEXOL 1 ML: 240 INJECTION, SOLUTION INTRATHECAL; INTRAVASCULAR; INTRAVENOUS; ORAL at 09:26

## 2020-06-26 RX ADMIN — METHYLPREDNISOLONE ACETATE 80 MG: 80 INJECTION, SUSPENSION INTRA-ARTICULAR; INTRALESIONAL; INTRAMUSCULAR; SOFT TISSUE at 09:28

## 2020-06-26 RX ADMIN — LIDOCAINE HYDROCHLORIDE 4 ML: 20 INJECTION, SOLUTION EPIDURAL; INFILTRATION; INTRACAUDAL; PERINEURAL at 09:24

## 2020-06-26 ASSESSMENT — FIBROSIS 4 INDEX: FIB4 SCORE: 2.58

## 2020-06-26 NOTE — NON-PROVIDER
Patient identified. Site and procedure confirmed and rashaun by Dr. Cooper .Medication allergies. Reviewed pertinent medical history ( A.Fib.,HTN,GERD,osteoporosis ).Xarelto off for 3 days Timeout completed, team and patient agreed.

## 2020-06-26 NOTE — NON-PROVIDER
Pre- procedure nursing checklist  assessment done.  Consent signed and H&P updated. Patient accompanied  to procedure room. Positioned onto procedure table and necessary monitoring equipment connected ( pulse oximeter ) with the help by  team  (CST,  X-ray TECH and RN ). Hands supported with stool underneath headrest of the bed , lower extremities supported with pillow.

## 2020-06-26 NOTE — PROCEDURES
DATE OF SERVICE:  06/26/2020    NAME OF PROCEDURES:  1.  L3-L4 interlaminar epidural steroid injection with 80 mg of Depo-Medrol   under fluoroscopic guidance.  2.  Permanent images have been recorded.    INDICATION:  This is a patient of mine with low back pain with referral into   the lower extremities, felt to be discogenic and from her known stenosis,   causing radiculitis.  For this reason, an epidural steroid injection was   chosen for today's procedure.    DESCRIPTION OF PROCEDURE:  After appropriate informed consent was obtained,   the patient was placed prone on the table.  Her skin was thoroughly cleansed   with a ChloraPrep swab.  Then, the subcutaneous, intramuscular,   interligamentous region was anesthetized with lidocaine.  A 3.5-inch Tuohy was   directed under intermittent fluoroscopic guidance to the lamina.  Once the   lamina was detected, the catheter was directed cephalad and medially and loss   of resistance technique was used to determine the epidural space.    EPIDUROGRAM:  A small amount of Omnipaque was placed to the right of midline   at L3-L4.  This was viewed on AP and lateral projections, showed a good   cephalad and caudad flow and the flow was unrestricted.  Depo-Medrol 80 mg   along with 0.5 mL of 2% lidocaine and an additional 1 mL of bacteriostatic   normal saline for a total of 2.5 mL was placed.  She tolerated the procedure   well without procedure complications.  She will be referred to the recovery   area and follow up in the office in the next week to monitor the response to   injection.       ____________________________________     ALFONSO MARTINEZ MD    AT / [facility]    DD:  06/26/2020 09:33:13  DT:  06/26/2020 10:03:15    D#:  [id number]  Job#:  [id number]    cc: Perlita Zuniga PT, Davon Waters MD

## 2020-06-26 NOTE — NON-PROVIDER
Discharge instructions reviewed again with reinforcement of infection prevention tips; ie:hand washing, covering cough,site  observation, Social distancing and mask use.  Taking fluids w/o difficulty. Dressing clean dry intact.   DC to  via ambulatory

## 2020-06-26 NOTE — NON-PROVIDER
Admitting assessment completed w 2 identifiers. Pt stated procedure. Medical Hx, meds +allergies reviewed +systems: no respiratory HX,cardiac (AFib), anticoagulants (xarelto stopped 6/23)+no antibiotic therapy, no renal hx, surgeries, pain,no falls, implants (Left hip and left Knee),no diabetes, no hepatitis. Confirmed Pt understanding of DC Instructions. Infection prevention including COVID precautions reviewed. Pt has  waiting.  Dr Antunez is cardiologist last seen 3/2020.

## 2020-07-24 ENCOUNTER — HOSPITAL ENCOUNTER (OUTPATIENT)
Dept: RADIOLOGY | Facility: MEDICAL CENTER | Age: 85
End: 2020-07-24
Attending: INTERNAL MEDICINE
Payer: MEDICARE

## 2020-07-24 ENCOUNTER — OFFICE VISIT (OUTPATIENT)
Dept: MEDICAL GROUP | Facility: PHYSICIAN GROUP | Age: 85
End: 2020-07-24
Payer: MEDICARE

## 2020-07-24 ENCOUNTER — HOSPITAL ENCOUNTER (OUTPATIENT)
Facility: MEDICAL CENTER | Age: 85
End: 2020-07-24
Attending: INTERNAL MEDICINE
Payer: MEDICARE

## 2020-07-24 VITALS
WEIGHT: 172.2 LBS | DIASTOLIC BLOOD PRESSURE: 68 MMHG | RESPIRATION RATE: 14 BRPM | BODY MASS INDEX: 30.51 KG/M2 | HEIGHT: 63 IN | SYSTOLIC BLOOD PRESSURE: 126 MMHG | OXYGEN SATURATION: 95 % | TEMPERATURE: 98.7 F | HEART RATE: 77 BPM

## 2020-07-24 DIAGNOSIS — M25.562 ACUTE PAIN OF BOTH KNEES: ICD-10-CM

## 2020-07-24 DIAGNOSIS — I82.5Y2 CHRONIC DEEP VEIN THROMBOSIS (DVT) OF PROXIMAL VEIN OF LEFT LOWER EXTREMITY (HCC): Chronic | ICD-10-CM

## 2020-07-24 DIAGNOSIS — M25.561 ACUTE PAIN OF BOTH KNEES: ICD-10-CM

## 2020-07-24 DIAGNOSIS — M05.79 RHEUMATOID ARTHRITIS INVOLVING MULTIPLE SITES WITH POSITIVE RHEUMATOID FACTOR (HCC): Chronic | ICD-10-CM

## 2020-07-24 DIAGNOSIS — I48.91 ATRIAL FIBRILLATION, UNSPECIFIED TYPE (HCC): Chronic | ICD-10-CM

## 2020-07-24 LAB
GRAM STN SPEC: NORMAL
SIGNIFICANT IND 70042: NORMAL
SITE SITE: NORMAL
SOURCE SOURCE: NORMAL

## 2020-07-24 PROCEDURE — 73564 X-RAY EXAM KNEE 4 OR MORE: CPT | Mod: RT

## 2020-07-24 PROCEDURE — 87070 CULTURE OTHR SPECIMN AEROBIC: CPT

## 2020-07-24 PROCEDURE — 99214 OFFICE O/P EST MOD 30 MIN: CPT | Performed by: INTERNAL MEDICINE

## 2020-07-24 PROCEDURE — 87205 SMEAR GRAM STAIN: CPT

## 2020-07-24 PROCEDURE — 73564 X-RAY EXAM KNEE 4 OR MORE: CPT | Mod: LT

## 2020-07-24 RX ORDER — CEPHALEXIN 250 MG/1
250 CAPSULE ORAL 4 TIMES DAILY
Qty: 40 CAP | Refills: 0 | Status: SHIPPED | OUTPATIENT
Start: 2020-07-24 | End: 2020-11-30

## 2020-07-24 ASSESSMENT — FIBROSIS 4 INDEX: FIB4 SCORE: 2.58

## 2020-07-24 NOTE — ASSESSMENT & PLAN NOTE
This is a new condition.  Patient reported that approximately 1 week ago she was bringing some jars into the garage.  The patient tripped over some boxes and fell onto both knees.  Patient reported open area in the left knee and the following day she also noted some swelling over the right knee.  She has been taking some Tylenol as needed for pain.  The patient denies any fever or chills.   Her last tetanus shot was in 2015.

## 2020-07-24 NOTE — ASSESSMENT & PLAN NOTE
This is a chronic condition the patient is currently on Xarelto she is followed by cardiology service.

## 2020-07-24 NOTE — PROGRESS NOTES
CC: Status fall with pain both knees      HPI: 84 y.o. Patient presents to discuss the following:     Bilateral knee pain  This is a new condition.  Patient reported that approximately 1 week ago she was bringing some jars into the garage.  The patient tripped over some boxes and fell onto both knees.  Patient reported open area in the left knee and the following day she also noted some swelling over the right knee.  She has been taking some Tylenol as needed for pain.  The patient denies any fever or chills.   Her last tetanus shot was in 2015.    Atrial fibrillation [I48.91]  This is a chronic condition the patient is currently on Xarelto she is followed by cardiology service.    Rheumatoid arthritis involving multiple sites with positive rheumatoid factor (HCC)  This is a chronic condition.  The patient is followed by rheumatology service.  She is currently taking Plaquenil daily.  Stable    Deep vein thrombosis (HCC)  Chronic recurrent condition.  Patient currently on Xarelto.           REVIEW OF SYSTEMS:     Constitutional:  no fever / chills   Neurologic: no headaches, no numbness/tingling  Eyes: no changes in vision  ENT: no sore throat, no hearing loss  CV:  no chest pain, no palpitations  Pulmonary: no SOB, no cough    GI: no nausea / vomiting, no diarrhea, no constipation  :  no dysuria, no hematuria       Allergies: Sulfa drugs    Current Outpatient Medications Ordered in Epic   Medication Sig Dispense Refill   • cephALEXin (KEFLEX) 250 MG Cap Take 1 Cap by mouth 4 times a day. 40 Cap 0   • hydroxychloroquine (PLAQUENIL) 200 MG Tab 1 tab po bid 180 Tab 0   • lidocaine (LIDODERM) 5 % Patch Apply 1 Patch to skin as directed every 24 hours. 90 Patch 0   • rivaroxaban (XARELTO) 20 MG Tab tablet Take 1 Tab by mouth with dinner. 90 Tab 1   • atorvastatin (LIPITOR) 10 MG Tab Take 1 Tab by mouth every evening. 90 Tab 3   • LYRICA 100 MG Cap Take 100 mg by mouth 2 times a day.     • omeprazole (PRILOSEC) 20 MG  "delayed-release capsule Take 20 mg by mouth every day.     • Diphenhydramine-APAP, sleep, (TYLENOL PM EXTRA STRENGTH)  MG Tab Take 2 Tabs by mouth every bedtime.     • acetaminophen (TYLENOL) 500 MG Tab Take 1,000 mg by mouth 2 Times a Day.     • Melatonin 10 MG Tab Take 1 Tab by mouth every bedtime.     • vitamin D (CHOLECALCIFEROL) 1000 UNIT Tab Take 1,000 Units by mouth every morning.     • Calcium Carbonate-Vitamin D (CALCIUM + D PO) Take 1 Tab by mouth every bedtime.       Current Facility-Administered Medications Ordered in Epic   Medication Dose Route Frequency Provider Last Rate Last Dose   • denosumab (PROLIA) subq injection 60 mg  60 mg Subcutaneous Q6 MO Deanna Escoto M.D.   60 mg at 06/11/20 1129       Past Medical History:   Diagnosis Date   • Adenocarcinoma of colon (HCC) 10/30/2018    From sessile serrated polyp 10/2018   • Anesthesia     pt states \"one new dr scraped my esophagus when they put the tube in and I started bleeding so they couldn't operate\"    • Atrial fibrillation [I48.91] 4/19/2016     pt denies    • Back pain 6/27/2012   • Blood clotting disorder (HCC) 2012    clot in leg   • Bowel habit changes     constipation    • Cancer (HCC)     skin, colon 2018   • Cataract     belia IOL    • Chronic back pain greater than 3 months duration    • Deep vein thrombosis (HCC) 3/8/2016    First occurrence in LLE in late 1970s Second occurrence further up in LLE in 2012, has been on AC since    • Essential hypertension, benign 6/27/2012   • GERD (gastroesophageal reflux disease) 6/27/2012   • Heart murmur    • Hyperlipidemia    • Hypertension     hx of, not currently    • Impaired fasting glucose 11/2/2017   • Mild aortic stenosis 11/2/2017   • Other and unspecified hyperlipidemia 6/27/2012   • Prediabetes    • Protein S deficiency (HCC) 11/2/2017    Noted in lab work 2013 as a part of work up at [facility]    • Rheumatoid arthritis involving multiple sites with positive rheumatoid factor " ([facility]) 03/14/2016    Dr. Escoto   • Rheumatoid nodule ([facility]) 7/25/2017   • Right bundle branch block 6/27/2012    pt denies    • Urinary incontinence 11/2/2017        Past Surgical History:   Procedure Laterality Date   • HEMICOLECTOMY Right 12/13/2018    Procedure: OPEN RIGHT HEMICOLECTOMY;  Surgeon: Dash Bhagat M.D.;  Location: SURGERY [facility];  Service: General   • INGUINAL HERNIA REPAIR Right 9/23/2016    Procedure: INGUINAL HERNIA REPAIR - PRIMARY;  Surgeon: Mary Medina M.D.;  Location: SURGERY [facility];  Service:    • OTHER  08/12/2014    peroneal nerve surgery Dr. Castro    • OTHER ORTHOPEDIC SURGERY  2014    left knee partial   • OTHER ORTHOPEDIC SURGERY  2011    meniscus repair   • ARTHROPLASTY      partial TKA with Dr. Persaud   • CHOLECYSTECTOMY     • HYSTERECTOMY, TOTAL ABDOMINAL  1970's   • ROTATOR CUFF REPAIR Bilateral         Family History   Problem Relation Age of Onset   • Cancer Mother         stomach   • Cancer Father         colon   • Leukemia Father         many exposure, worked for [employer]    • Heart Disease Brother         s/p stent         Social History     Tobacco Use   Smoking Status Never Smoker   Smokeless Tobacco Never Used          Social History     Substance and Sexual Activity   Alcohol Use Not Currently   • Alcohol/week: 0.0 oz    Comment: occ        ---------------------------------------------------------------------     PHYSICAL EXAM:   Vitals:    07/24/20 0712   BP: 126/68   Pulse: 77   Resp: 14   Temp: 37.1 °C (98.7 °F)   SpO2: 95%      Body mass index is 30.5 kg/m².        Constitutional: no acute distress  Eyes: PERRL, EOMI  Ears/nose/mouth: OP no exudates  Neck: supple, no JVD  CV: heart irregularly irregular  Resp: normal effort, no wheezing or rales.  GI: abdomen soft, no obvious mass, no tenderness  Left knee.  There is an open superficial abrasion approximately 1 cm in longest diameter with surrounding erythema next to it is a small  vesicular lesion draining serosanguineous fluid culture taken there is some surrounding erythema.  No fluctuance noted range of motion is restricted due to pain  Right knee: There is a swelling in the infra patellar region with ecchymosis.  It is not warm to the touch.  Range of motion also limited due to pain.        ---------------------------------------------------------------------     ASSESSMENT and PLAN:  1. Acute pain of both knees  For the left knee culture taken from the draining fluid.  The wound was cleansed in a sterile fashion using Betadine swab x3.  Sterile dressing applied.  X-ray of both knees ordered.  The swelling on the right knee likely due to hematoma.  Ultrasound of the right knee ordered for further evaluation  Patient will start on Keflex empirically advised the patient dressing change twice daily.  Recommend office follow-up again in approximately 2 weeks she is advised to call return sooner if fever chills increased redness swelling pain etc.  - cephALEXin (KEFLEX) 250 MG Cap; Take 1 Cap by mouth 4 times a day.  Dispense: 40 Cap; Refill: 0  - DX-KNEE 3 VIEWS LEFT; Future  - DX-KNEE 3 VIEWS RIGHT; Future  - US-EXTREMITY NON VASCULAR UNILATERAL RIGHT; Future    2. Atrial fibrillation, unspecified type (HCC)  Chronic stable condition continue with Xarelto.    3. Rheumatoid arthritis involving multiple sites with positive rheumatoid factor (HCC)  Chronic stable condition continue with Plaquenil.  Advised the patient continue follow-up with rheumatologist.    4. 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            Return in about 2 weeks (around 8/7/2020).       PATIENT EDUCATION:  -If any problems should arise, patient was advised to contact our office or go to ER to be evaluated.  -Advised pt to follow a healthy diet and regular aerobic exercise regimen. Advised pt to avoid alcohol and tobacco  use.    Please note that this dictation was created using voice recognition software. I have made every reasonable attempt to correct obvious errors, but it is possible there are errors of grammar and possibly content that I did not discover before finalizing the note.

## 2020-07-24 NOTE — ASSESSMENT & PLAN NOTE
This is a chronic condition.  The patient is followed by rheumatology service.  She is currently taking Plaquenil daily.  Stable

## 2020-07-27 ENCOUNTER — HOSPITAL ENCOUNTER (EMERGENCY)
Facility: MEDICAL CENTER | Age: 85
End: 2020-07-27
Attending: EMERGENCY MEDICINE
Payer: MEDICARE

## 2020-07-27 ENCOUNTER — HOSPITAL ENCOUNTER (OUTPATIENT)
Dept: RADIOLOGY | Facility: MEDICAL CENTER | Age: 85
End: 2020-07-27
Attending: INTERNAL MEDICINE
Payer: MEDICARE

## 2020-07-27 VITALS
RESPIRATION RATE: 16 BRPM | HEIGHT: 63 IN | HEART RATE: 75 BPM | OXYGEN SATURATION: 98 % | DIASTOLIC BLOOD PRESSURE: 88 MMHG | BODY MASS INDEX: 30.3 KG/M2 | WEIGHT: 171 LBS | SYSTOLIC BLOOD PRESSURE: 135 MMHG | TEMPERATURE: 97.3 F

## 2020-07-27 DIAGNOSIS — M25.562 ACUTE PAIN OF BOTH KNEES: ICD-10-CM

## 2020-07-27 DIAGNOSIS — S80.02XA HEMATOMA OF LEFT KNEE REGION: ICD-10-CM

## 2020-07-27 DIAGNOSIS — M25.561 ACUTE PAIN OF BOTH KNEES: ICD-10-CM

## 2020-07-27 DIAGNOSIS — M25.561 ACUTE PAIN OF RIGHT KNEE: ICD-10-CM

## 2020-07-27 LAB
ALBUMIN SERPL BCP-MCNC: 3.8 G/DL (ref 3.2–4.9)
ALBUMIN/GLOB SERPL: 1.6 G/DL
ALP SERPL-CCNC: 49 U/L (ref 30–99)
ALT SERPL-CCNC: 15 U/L (ref 2–50)
ANION GAP SERPL CALC-SCNC: 10 MMOL/L (ref 7–16)
AST SERPL-CCNC: 20 U/L (ref 12–45)
BACTERIA WND AEROBE CULT: NORMAL
BASOPHILS # BLD AUTO: 0.7 % (ref 0–1.8)
BASOPHILS # BLD: 0.04 K/UL (ref 0–0.12)
BILIRUB SERPL-MCNC: 0.6 MG/DL (ref 0.1–1.5)
BUN SERPL-MCNC: 20 MG/DL (ref 8–22)
CALCIUM SERPL-MCNC: 8.6 MG/DL (ref 8.5–10.5)
CHLORIDE SERPL-SCNC: 106 MMOL/L (ref 96–112)
CO2 SERPL-SCNC: 27 MMOL/L (ref 20–33)
CREAT SERPL-MCNC: 0.66 MG/DL (ref 0.5–1.4)
CRP SERPL HS-MCNC: 16.4 MG/L (ref 0–7.5)
EOSINOPHIL # BLD AUTO: 0.13 K/UL (ref 0–0.51)
EOSINOPHIL NFR BLD: 2.4 % (ref 0–6.9)
ERYTHROCYTE [DISTWIDTH] IN BLOOD BY AUTOMATED COUNT: 46.6 FL (ref 35.9–50)
GLOBULIN SER CALC-MCNC: 2.4 G/DL (ref 1.9–3.5)
GLUCOSE SERPL-MCNC: 106 MG/DL (ref 65–99)
GRAM STN SPEC: NORMAL
HCT VFR BLD AUTO: 37.5 % (ref 37–47)
HGB BLD-MCNC: 12.4 G/DL (ref 12–16)
IMM GRANULOCYTES # BLD AUTO: 0.01 K/UL (ref 0–0.11)
IMM GRANULOCYTES NFR BLD AUTO: 0.2 % (ref 0–0.9)
LACTATE BLD-SCNC: 1.1 MMOL/L (ref 0.5–2)
LYMPHOCYTES # BLD AUTO: 1.22 K/UL (ref 1–4.8)
LYMPHOCYTES NFR BLD: 22.3 % (ref 22–41)
MCH RBC QN AUTO: 31.8 PG (ref 27–33)
MCHC RBC AUTO-ENTMCNC: 33.1 G/DL (ref 33.6–35)
MCV RBC AUTO: 96.2 FL (ref 81.4–97.8)
MONOCYTES # BLD AUTO: 0.54 K/UL (ref 0–0.85)
MONOCYTES NFR BLD AUTO: 9.9 % (ref 0–13.4)
NEUTROPHILS # BLD AUTO: 3.53 K/UL (ref 2–7.15)
NEUTROPHILS NFR BLD: 64.5 % (ref 44–72)
NRBC # BLD AUTO: 0 K/UL
NRBC BLD-RTO: 0 /100 WBC
PLATELET # BLD AUTO: 176 K/UL (ref 164–446)
PMV BLD AUTO: 11.1 FL (ref 9–12.9)
POTASSIUM SERPL-SCNC: 4.2 MMOL/L (ref 3.6–5.5)
PROT SERPL-MCNC: 6.2 G/DL (ref 6–8.2)
RBC # BLD AUTO: 3.9 M/UL (ref 4.2–5.4)
SIGNIFICANT IND 70042: NORMAL
SITE SITE: NORMAL
SODIUM SERPL-SCNC: 143 MMOL/L (ref 135–145)
SOURCE SOURCE: NORMAL
T4 FREE SERPL-MCNC: 0.78 NG/DL (ref 0.93–1.7)
TSH SERPL DL<=0.005 MIU/L-ACNC: 3.19 UIU/ML (ref 0.38–5.33)
WBC # BLD AUTO: 5.5 K/UL (ref 4.8–10.8)

## 2020-07-27 PROCEDURE — 99284 EMERGENCY DEPT VISIT MOD MDM: CPT

## 2020-07-27 PROCEDURE — 87040 BLOOD CULTURE FOR BACTERIA: CPT | Mod: 91

## 2020-07-27 PROCEDURE — 76882 US LMTD JT/FCL EVL NVASC XTR: CPT | Mod: RT

## 2020-07-27 PROCEDURE — 302874 HCHG BANDAGE ACE 2 OR 3""

## 2020-07-27 PROCEDURE — 700102 HCHG RX REV CODE 250 W/ 637 OVERRIDE(OP): Performed by: EMERGENCY MEDICINE

## 2020-07-27 PROCEDURE — 84439 ASSAY OF FREE THYROXINE: CPT

## 2020-07-27 PROCEDURE — 83605 ASSAY OF LACTIC ACID: CPT

## 2020-07-27 PROCEDURE — 86141 C-REACTIVE PROTEIN HS: CPT

## 2020-07-27 PROCEDURE — 80053 COMPREHEN METABOLIC PANEL: CPT

## 2020-07-27 PROCEDURE — 85025 COMPLETE CBC W/AUTO DIFF WBC: CPT

## 2020-07-27 PROCEDURE — 84443 ASSAY THYROID STIM HORMONE: CPT

## 2020-07-27 PROCEDURE — A9270 NON-COVERED ITEM OR SERVICE: HCPCS | Performed by: EMERGENCY MEDICINE

## 2020-07-27 RX ORDER — ACETAMINOPHEN 325 MG/1
650 TABLET ORAL ONCE
Status: COMPLETED | OUTPATIENT
Start: 2020-07-27 | End: 2020-07-27

## 2020-07-27 RX ADMIN — ACETAMINOPHEN 650 MG: 325 TABLET, FILM COATED ORAL at 19:21

## 2020-07-27 ASSESSMENT — FIBROSIS 4 INDEX: FIB4 SCORE: 2.58

## 2020-07-27 NOTE — ED TRIAGE NOTES
Marry Mathur presents to triage, wearing a mask, reporting:  Chief Complaint   Patient presents with   • Wound Check     Pt fel 1.5 wk ago and has bilat knee wounds.   LLE wound is red, edematous, seeping clear fluid. RLE wound is red. Pt on xarelto for hx of DVT. Pt denies calf pain or warmth. Pt had outpt US today, results pending.    Pt denies any respiratory or flu like symptoms at this time.    Pt speaking in full sentences, NAD noted. Pt educated of triage process, placed back in waiting area pending room assignment.

## 2020-07-28 ENCOUNTER — TELEPHONE (OUTPATIENT)
Dept: MEDICAL GROUP | Facility: PHYSICIAN GROUP | Age: 85
End: 2020-07-28

## 2020-07-28 NOTE — ED NOTES
Pt discharged home. Explained discharge instructions. Questions and comments addressed. Pt verbalized understanding of instructions. Pt advised to follow-up with Orthopaedic or return to ED for any new or worsening of symptoms. Pt is ambulating well with cane. VS stable. Pt's friend at bedside and will be driving pt home.

## 2020-07-28 NOTE — ED PROVIDER NOTES
"ED Provider Note    CHIEF COMPLAINT   Chief Complaint   Patient presents with   • Wound Check     Pt fel 1.5 wk ago and has bilat knee wounds.       HPI   Marry Mathur is a 84 y.o. female who presents for evaluation of the left knee.  Small abrasion developed from a blister that broke down on the left knee, causing serous drainage.  She describes clear yellow fluid from this.  She had an outpatient ultrasound today of the left knee at the order of her physician which demonstrated a 4 x 4 x 2 cm hematoma.  Patient states she is currently taking antibiotics prescribed by her primary doctor as there felt to be possible skin infection.  5 days ago the patient tripped, landing on both knees on the ground.  She had negative x-rays at that time of both knees.  She has had difficulty with bruising, states she takes Xarelto for history of DVT.  She denies head injury.  No discomfort above the knees.  Since the injury is developed bruising along the length of her tibia region down to her feet.  Left knee has been more swollen than the right.  Patient is able to walk although does have some discomfort in the left knee doing so.  She attempts to elevate her legs when possible.  She washes the abrasion left knee daily with Betadine and bacitracin.    REVIEW OF SYSTEMS   Musculoskeletal: Left knee pain, recent bilateral knee injury  Neurologic: Denies head injury  Skin: Swelling and bruising bilateral legs.  Swelling left knee greater than the right      PAST MEDICAL HISTORY   Past Medical History:   Diagnosis Date   • Adenocarcinoma of colon (HCC) 10/30/2018    From sessile serrated polyp 10/2018   • Anesthesia     pt states \"one new dr scraped my esophagus when they put the tube in and I started bleeding so they couldn't operate\"    • Atrial fibrillation [I48.91] 4/19/2016     pt denies    • Back pain 6/27/2012   • Blood clotting disorder (HCC) 2012    clot in leg   • Bowel habit changes     constipation    • Cancer " (HCC)     skin, colon 2018   • Cataract     belia IOL    • Chronic back pain greater than 3 months duration    • Deep vein thrombosis (HCC) 3/8/2016    First occurrence in LLE in late 1970s Second occurrence further up in LLE in 2012, has been on AC since    • Essential hypertension, benign 6/27/2012   • GERD (gastroesophageal reflux disease) 6/27/2012   • Heart murmur    • Hyperlipidemia    • Hypertension     hx of, not currently    • Impaired fasting glucose 11/2/2017   • Mild aortic stenosis 11/2/2017   • Other and unspecified hyperlipidemia 6/27/2012   • Prediabetes    • Protein S deficiency (HCC) 11/2/2017    Noted in lab work 2013 as a part of work up at [facility]    • Rheumatoid arthritis involving multiple sites with positive rheumatoid factor (HCC) 03/14/2016    Dr. Escoto   • Rheumatoid nodule (HCC) 7/25/2017   • Right bundle branch block 6/27/2012    pt denies    • Urinary incontinence 11/2/2017       FAMILY HISTORY  Family History   Problem Relation Age of Onset   • Cancer Mother         stomach   • Cancer Father         colon   • Leukemia Father         many exposure, worked for [email or web address]    • Heart Disease Brother         s/p stent        SOCIAL HISTORY  Social History     Socioeconomic History   • Marital status:      Spouse name: Not on file   • Number of children: Not on file   • Years of education: Not on file   • Highest education level: Not on file   Occupational History   • Not on file   Social Needs   • Financial resource strain: Not on file   • Food insecurity     Worry: Not on file     Inability: Not on file   • Transportation needs     Medical: Not on file     Non-medical: Not on file   Tobacco Use   • Smoking status: Never Smoker   • Smokeless tobacco: Never Used   Substance and Sexual Activity   • Alcohol use: Not Currently     Alcohol/week: 0.0 oz     Comment: occ   • Drug use: No   • Sexual activity: Not Currently     Partners: Male     Comment:     Lifestyle   •  Physical activity     Days per week: Not on file     Minutes per session: Not on file   • Stress: Not on file   Relationships   • Social connections     Talks on phone: Not on file     Gets together: Not on file     Attends Voodoo service: Not on file     Active member of club or organization: Not on file     Attends meetings of clubs or organizations: Not on file     Relationship status: Not on file   • Intimate partner violence     Fear of current or ex partner: Not on file     Emotionally abused: Not on file     Physically abused: Not on file     Forced sexual activity: Not on file   Other Topics Concern   • Not on file   Social History Narrative    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 Castro    • OTHER ORTHOPEDIC SURGERY  2014    left knee partial   • OTHER ORTHOPEDIC SURGERY  2011    meniscus repair   • ARTHROPLASTY      partial TKA with Dr. Persaud   • CHOLECYSTECTOMY     • HYSTERECTOMY, TOTAL ABDOMINAL  1970's   • ROTATOR CUFF REPAIR Bilateral        CURRENT MEDICATIONS   Home Medications     Reviewed by Schuyler B Collett, R.N. (Registered Nurse) on 07/27/20 at 1639  Med List Status: <None>   Medication Last Dose Status   acetaminophen (TYLENOL) 500 MG Tab  Active   atorvastatin (LIPITOR) 10 MG Tab  Active   Calcium Carbonate-Vitamin D (CALCIUM + D PO)  Active   cephALEXin (KEFLEX) 250 MG Cap  Active   denosumab (PROLIA) subq injection 60 mg  Active   Diphenhydramine-APAP, sleep, (TYLENOL PM EXTRA STRENGTH)  MG  "Tab  Active   hydroxychloroquine (PLAQUENIL) 200 MG Tab  Active   lidocaine (LIDODERM) 5 % Patch  Active   LYRICA 100 MG Cap  Active   Melatonin 10 MG Tab  Active   omeprazole (PRILOSEC) 20 MG delayed-release capsule  Active   rivaroxaban (XARELTO) 20 MG Tab tablet  Active   vitamin D (CHOLECALCIFEROL) 1000 UNIT Tab  Active                ALLERGIES   Allergies   Allergen Reactions   • Sulfa Drugs Vomiting     RXN=young person       PHYSICAL EXAM  VITAL SIGNS: /83   Pulse 77   Temp 36.6 °C (97.9 °F) (Temporal)   Resp 18   Ht 1.6 m (5' 3\")   Wt 77.6 kg (171 lb)   SpO2 96%   BMI 30.29 kg/m²   Skin: Slight fluctuance left anterior patellar and infrapatellar region, overlying erythematous change and slight warmth.  No induration.  There is purplish and yellowish bruising from the left knee to the left foot.  Similar findings on the right minus the area of fluctuant swelling..   Vascular: Intact distal capillary refill.   Musculoskeletal: Tenderness bilateral anterior knee region.  No crepitance or deformity.  Bilateral ankles nontender.  Neurologic: Sensation is intact both feet    RADIOLOGY/PROCEDURES  Ultrasound from earlier today shows:  FINDINGS:  A complex fluid collection measures approximately 4.6 x 4.2 x 1.2 cm. No internal flow is identified.     IMPRESSION:     Complex fluid collection inferior to the patella likely represents a hematoma    Results for orders placed or performed during the hospital encounter of 07/27/20   CBC WITH DIFFERENTIAL   Result Value Ref Range    WBC 5.5 4.8 - 10.8 K/uL    RBC 3.90 (L) 4.20 - 5.40 M/uL    Hemoglobin 12.4 12.0 - 16.0 g/dL    Hematocrit 37.5 37.0 - 47.0 %    MCV 96.2 81.4 - 97.8 fL    MCH 31.8 27.0 - 33.0 pg    MCHC 33.1 (L) 33.6 - 35.0 g/dL    RDW 46.6 35.9 - 50.0 fL    Platelet Count 176 164 - 446 K/uL    MPV 11.1 9.0 - 12.9 fL    Neutrophils-Polys 64.50 44.00 - 72.00 %    Lymphocytes 22.30 22.00 - 41.00 %    Monocytes 9.90 0.00 - 13.40 %    Eosinophils " 2.40 0.00 - 6.90 %    Basophils 0.70 0.00 - 1.80 %    Immature Granulocytes 0.20 0.00 - 0.90 %    Nucleated RBC 0.00 /100 WBC    Neutrophils (Absolute) 3.53 2.00 - 7.15 K/uL    Lymphs (Absolute) 1.22 1.00 - 4.80 K/uL    Monos (Absolute) 0.54 0.00 - 0.85 K/uL    Eos (Absolute) 0.13 0.00 - 0.51 K/uL    Baso (Absolute) 0.04 0.00 - 0.12 K/uL    Immature Granulocytes (abs) 0.01 0.00 - 0.11 K/uL    NRBC (Absolute) 0.00 K/uL   COMP METABOLIC PANEL   Result Value Ref Range    Sodium 143 135 - 145 mmol/L    Potassium 4.2 3.6 - 5.5 mmol/L    Chloride 106 96 - 112 mmol/L    Co2 27 20 - 33 mmol/L    Anion Gap 10.0 7.0 - 16.0    Glucose 106 (H) 65 - 99 mg/dL    Bun 20 8 - 22 mg/dL    Creatinine 0.66 0.50 - 1.40 mg/dL    Calcium 8.6 8.5 - 10.5 mg/dL    AST(SGOT) 20 12 - 45 U/L    ALT(SGPT) 15 2 - 50 U/L    Alkaline Phosphatase 49 30 - 99 U/L    Total Bilirubin 0.6 0.1 - 1.5 mg/dL    Albumin 3.8 3.2 - 4.9 g/dL    Total Protein 6.2 6.0 - 8.2 g/dL    Globulin 2.4 1.9 - 3.5 g/dL    A-G Ratio 1.6 g/dL   LACTIC ACID   Result Value Ref Range    Lactic Acid 1.1 0.5 - 2.0 mmol/L   ESTIMATED GFR   Result Value Ref Range    GFR If African American >60 >60 mL/min/1.73 m 2    GFR If Non African American >60 >60 mL/min/1.73 m 2         COURSE & MEDICAL DECISION MAKING  Pertinent Labs & Imaging studies reviewed. (See chart for details)  Differential diagnosis includes cellulitis with abscess, hematoma.  Patient is afebrile, her white blood cell count is normal.  Blood culture was obtained, pending.  Lactic acid negative.  Patient does not appear to be infected at this time however is encouraged to finish her antibiotics as prescribed.  I will refer the patient to orthopedics for reevaluation.  Fluid draining from the area appears to be serous drainage from the skin breakdown left anterior knee.  It does not appear purulent.  Patient will be given an Ace wrap for compression to the area regarding presence of hematoma.  She states she will  continue to use her cane to walk at home.    FINAL IMPRESSION     1. Hematoma of left knee region               Electronically signed by: Denver Gupta M.D., 7/27/2020 7:37 PM

## 2020-07-28 NOTE — TELEPHONE ENCOUNTER
----- Message from Davon Waters M.D. sent at 7/27/2020  5:02 PM PDT -----    Please call patient : US R knee showed fluid collection  Likely due to a hematoma [collection of blood]>> A referral has been submitted to Orthopedic  for further evaluation.

## 2020-07-31 ENCOUNTER — ANESTHESIA EVENT (OUTPATIENT)
Dept: SURGERY | Facility: MEDICAL CENTER | Age: 85
End: 2020-07-31
Payer: MEDICARE

## 2020-07-31 ENCOUNTER — ANESTHESIA (OUTPATIENT)
Dept: SURGERY | Facility: MEDICAL CENTER | Age: 85
End: 2020-07-31
Payer: MEDICARE

## 2020-07-31 ENCOUNTER — HOSPITAL ENCOUNTER (OUTPATIENT)
Facility: MEDICAL CENTER | Age: 85
End: 2020-07-31
Attending: ORTHOPAEDIC SURGERY | Admitting: ORTHOPAEDIC SURGERY
Payer: MEDICARE

## 2020-07-31 VITALS
SYSTOLIC BLOOD PRESSURE: 168 MMHG | RESPIRATION RATE: 14 BRPM | DIASTOLIC BLOOD PRESSURE: 88 MMHG | OXYGEN SATURATION: 91 % | HEART RATE: 81 BPM | WEIGHT: 170.42 LBS | BODY MASS INDEX: 30.2 KG/M2 | HEIGHT: 63 IN | TEMPERATURE: 97 F

## 2020-07-31 LAB
COVID ORDER STATUS COVID19: NORMAL
EKG IMPRESSION: NORMAL
GRAM STN SPEC: NORMAL
GRAM STN SPEC: NORMAL
SARS-COV-2 RDRP RESP QL NAA+PROBE: NOTDETECTED
SIGNIFICANT IND 70042: NORMAL
SIGNIFICANT IND 70042: NORMAL
SITE SITE: NORMAL
SITE SITE: NORMAL
SOURCE SOURCE: NORMAL
SOURCE SOURCE: NORMAL
SPECIMEN SOURCE: NORMAL

## 2020-07-31 PROCEDURE — 87075 CULTR BACTERIA EXCEPT BLOOD: CPT

## 2020-07-31 PROCEDURE — 500891 HCHG PACK, ORTHO MAJOR: Performed by: ORTHOPAEDIC SURGERY

## 2020-07-31 PROCEDURE — 93005 ELECTROCARDIOGRAM TRACING: CPT | Performed by: ORTHOPAEDIC SURGERY

## 2020-07-31 PROCEDURE — C9803 HOPD COVID-19 SPEC COLLECT: HCPCS | Performed by: ORTHOPAEDIC SURGERY

## 2020-07-31 PROCEDURE — 160009 HCHG ANES TIME/MIN: Performed by: ORTHOPAEDIC SURGERY

## 2020-07-31 PROCEDURE — 160027 HCHG SURGERY MINUTES - 1ST 30 MINS LEVEL 2: Performed by: ORTHOPAEDIC SURGERY

## 2020-07-31 PROCEDURE — 160048 HCHG OR STATISTICAL LEVEL 1-5: Performed by: ORTHOPAEDIC SURGERY

## 2020-07-31 PROCEDURE — A9270 NON-COVERED ITEM OR SERVICE: HCPCS | Performed by: ANESTHESIOLOGY

## 2020-07-31 PROCEDURE — 700102 HCHG RX REV CODE 250 W/ 637 OVERRIDE(OP): Performed by: ANESTHESIOLOGY

## 2020-07-31 PROCEDURE — 160036 HCHG PACU - EA ADDL 30 MINS PHASE I: Performed by: ORTHOPAEDIC SURGERY

## 2020-07-31 PROCEDURE — 160046 HCHG PACU - 1ST 60 MINS PHASE II: Performed by: ORTHOPAEDIC SURGERY

## 2020-07-31 PROCEDURE — 87070 CULTURE OTHR SPECIMN AEROBIC: CPT

## 2020-07-31 PROCEDURE — 160002 HCHG RECOVERY MINUTES (STAT): Performed by: ORTHOPAEDIC SURGERY

## 2020-07-31 PROCEDURE — 700101 HCHG RX REV CODE 250: Performed by: ORTHOPAEDIC SURGERY

## 2020-07-31 PROCEDURE — 501838 HCHG SUTURE GENERAL: Performed by: ORTHOPAEDIC SURGERY

## 2020-07-31 PROCEDURE — 700105 HCHG RX REV CODE 258: Performed by: ORTHOPAEDIC SURGERY

## 2020-07-31 PROCEDURE — 87205 SMEAR GRAM STAIN: CPT

## 2020-07-31 PROCEDURE — U0004 COV-19 TEST NON-CDC HGH THRU: HCPCS

## 2020-07-31 PROCEDURE — 93010 ELECTROCARDIOGRAM REPORT: CPT | Performed by: INTERNAL MEDICINE

## 2020-07-31 PROCEDURE — 160025 RECOVERY II MINUTES (STATS): Performed by: ORTHOPAEDIC SURGERY

## 2020-07-31 PROCEDURE — 700111 HCHG RX REV CODE 636 W/ 250 OVERRIDE (IP): Performed by: ANESTHESIOLOGY

## 2020-07-31 PROCEDURE — 160035 HCHG PACU - 1ST 60 MINS PHASE I: Performed by: ORTHOPAEDIC SURGERY

## 2020-07-31 RX ORDER — DIPHENHYDRAMINE HYDROCHLORIDE 50 MG/ML
12.5 INJECTION INTRAMUSCULAR; INTRAVENOUS
Status: DISCONTINUED | OUTPATIENT
Start: 2020-07-31 | End: 2020-07-31 | Stop reason: HOSPADM

## 2020-07-31 RX ORDER — HALOPERIDOL 5 MG/ML
1 INJECTION INTRAMUSCULAR
Status: DISCONTINUED | OUTPATIENT
Start: 2020-07-31 | End: 2020-07-31 | Stop reason: HOSPADM

## 2020-07-31 RX ORDER — HYDROMORPHONE HYDROCHLORIDE 1 MG/ML
0.1 INJECTION, SOLUTION INTRAMUSCULAR; INTRAVENOUS; SUBCUTANEOUS
Status: DISCONTINUED | OUTPATIENT
Start: 2020-07-31 | End: 2020-07-31 | Stop reason: HOSPADM

## 2020-07-31 RX ORDER — SODIUM CHLORIDE, SODIUM LACTATE, POTASSIUM CHLORIDE, CALCIUM CHLORIDE 600; 310; 30; 20 MG/100ML; MG/100ML; MG/100ML; MG/100ML
INJECTION, SOLUTION INTRAVENOUS CONTINUOUS
Status: DISCONTINUED | OUTPATIENT
Start: 2020-07-31 | End: 2020-07-31 | Stop reason: HOSPADM

## 2020-07-31 RX ORDER — HYDROMORPHONE HYDROCHLORIDE 1 MG/ML
0.2 INJECTION, SOLUTION INTRAMUSCULAR; INTRAVENOUS; SUBCUTANEOUS
Status: DISCONTINUED | OUTPATIENT
Start: 2020-07-31 | End: 2020-07-31 | Stop reason: HOSPADM

## 2020-07-31 RX ORDER — DEXAMETHASONE SODIUM PHOSPHATE 4 MG/ML
INJECTION, SOLUTION INTRA-ARTICULAR; INTRALESIONAL; INTRAMUSCULAR; INTRAVENOUS; SOFT TISSUE PRN
Status: DISCONTINUED | OUTPATIENT
Start: 2020-07-31 | End: 2020-07-31 | Stop reason: SURG

## 2020-07-31 RX ORDER — OXYCODONE HCL 5 MG/5 ML
10 SOLUTION, ORAL ORAL
Status: COMPLETED | OUTPATIENT
Start: 2020-07-31 | End: 2020-07-31

## 2020-07-31 RX ORDER — HYDROMORPHONE HYDROCHLORIDE 1 MG/ML
0.4 INJECTION, SOLUTION INTRAMUSCULAR; INTRAVENOUS; SUBCUTANEOUS
Status: DISCONTINUED | OUTPATIENT
Start: 2020-07-31 | End: 2020-07-31 | Stop reason: HOSPADM

## 2020-07-31 RX ORDER — OXYCODONE HCL 5 MG/5 ML
5 SOLUTION, ORAL ORAL
Status: COMPLETED | OUTPATIENT
Start: 2020-07-31 | End: 2020-07-31

## 2020-07-31 RX ORDER — ONDANSETRON 2 MG/ML
INJECTION INTRAMUSCULAR; INTRAVENOUS PRN
Status: DISCONTINUED | OUTPATIENT
Start: 2020-07-31 | End: 2020-07-31 | Stop reason: SURG

## 2020-07-31 RX ORDER — CEFAZOLIN SODIUM 1 G/3ML
INJECTION, POWDER, FOR SOLUTION INTRAMUSCULAR; INTRAVENOUS PRN
Status: DISCONTINUED | OUTPATIENT
Start: 2020-07-31 | End: 2020-07-31 | Stop reason: SURG

## 2020-07-31 RX ORDER — ONDANSETRON 2 MG/ML
4 INJECTION INTRAMUSCULAR; INTRAVENOUS
Status: DISCONTINUED | OUTPATIENT
Start: 2020-07-31 | End: 2020-07-31 | Stop reason: HOSPADM

## 2020-07-31 RX ADMIN — FENTANYL CITRATE 100 MCG: 50 INJECTION INTRAMUSCULAR; INTRAVENOUS at 08:20

## 2020-07-31 RX ADMIN — OXYCODONE HYDROCHLORIDE 5 MG: 5 SOLUTION ORAL at 09:03

## 2020-07-31 RX ADMIN — DEXAMETHASONE SODIUM PHOSPHATE 8 MG: 4 INJECTION, SOLUTION INTRA-ARTICULAR; INTRALESIONAL; INTRAMUSCULAR; INTRAVENOUS; SOFT TISSUE at 08:20

## 2020-07-31 RX ADMIN — SODIUM CHLORIDE, POTASSIUM CHLORIDE, SODIUM LACTATE AND CALCIUM CHLORIDE: 600; 310; 30; 20 INJECTION, SOLUTION INTRAVENOUS at 07:49

## 2020-07-31 RX ADMIN — LIDOCAINE HYDROCHLORIDE 0.5 ML: 10 INJECTION, SOLUTION EPIDURAL; INFILTRATION; INTRACAUDAL at 07:49

## 2020-07-31 RX ADMIN — ONDANSETRON 4 MG: 2 INJECTION INTRAMUSCULAR; INTRAVENOUS at 08:20

## 2020-07-31 RX ADMIN — CEFAZOLIN 2 G: 330 INJECTION, POWDER, FOR SOLUTION INTRAMUSCULAR; INTRAVENOUS at 08:20

## 2020-07-31 ASSESSMENT — PAIN SCALES - GENERAL: PAIN_LEVEL: 3

## 2020-07-31 ASSESSMENT — FIBROSIS 4 INDEX: FIB4 SCORE: 2.44

## 2020-07-31 NOTE — ANESTHESIA PREPROCEDURE EVALUATION
Relevant Problems   NEURO   (+) History of colon cancer      CARDIAC   (+) Aortic stenosis   (+) Atrial fibrillation [I48.91]   (+) Deep vein thrombosis (HCC)   (+) Essential hypertension, benign   (+) Right bundle branch block      GI   (+) GERD (gastroesophageal reflux disease)      Other   (+) Rheumatoid arthritis involving multiple sites with positive rheumatoid factor (HCC)       Physical Exam    Airway   Mallampati: II  TM distance: >3 FB  Neck ROM: full       Cardiovascular   Rhythm: regular  Rate: normal     Dental - normal exam           Pulmonary    Abdominal - normal exam     Neurological - normal exam                 Anesthesia Plan    ASA 2       Plan - general       Airway plan will be LMA      Plan Factors:   Patient was not previously instructed to abstain from smoking on day of procedure.  Patient did not smoke on day of procedure.      Induction: intravenous          Informed Consent:    Anesthetic plan and risks discussed with patient.    Use of blood products discussed with: patient whom.

## 2020-07-31 NOTE — ANESTHESIA PROCEDURE NOTES
Airway    Date/Time: 7/31/2020 8:21 AM  Performed by: Maco Holley M.D.  Authorized by: Maco Holley M.D.     Location:  OR  Difficult Airway: No    Indications for Airway Management:  Anesthesia      Spontaneous Ventilation: present    Sedation Level:  Deep  Preoxygenated: Yes    Patient Position:  Sniffing  MILS Maintained Throughout: No    Mask Difficulty Assessment:  0 - not attempted  Final Airway Type:  Supraglottic airway  Final Supraglottic Airway:  Standard LMA    SGA Size:  4  Number of Attempts at Approach:  1

## 2020-07-31 NOTE — OR NURSING
1015 - pt verbalizes readiness for discharge. Instructions reviewed with pt and pt's friend. No further needs. Pt taken to car via wheelchair by CNA.

## 2020-07-31 NOTE — ANESTHESIA POSTPROCEDURE EVALUATION
Patient: Marry Mathur    Procedure Summary     Date:  07/31/20 Room / Location:  [place] [address] / SURGERY [facility]    Anesthesia Start:  0814 Anesthesia Stop:  0846    Procedure:  IRRIGATION AND DEBRIDEMENT, WOUND - KNEE (Bilateral Knee) Diagnosis:  (INFECTION OF PREPATELLAR BURSA LEFT KNEE)    Surgeon:  Roosevelt Saucedo M.D. Responsible Provider:  Maco Holley M.D.    Anesthesia Type:  general ASA Status:  2          Final Anesthesia Type: general  Last vitals  BP   Blood Pressure : 151/73    Temp   36.3 °C (97.4 °F)    Pulse   Pulse: 68   Resp   17    SpO2   96 %      Anesthesia Post Evaluation    Patient location during evaluation: PACU  Patient participation: complete - patient participated  Level of consciousness: awake  Pain score: 3    Airway patency: patent  Anesthetic complications: no  Cardiovascular status: adequate  Respiratory status: acceptable  Hydration status: acceptable    PONV: none           Nurse Pain Score: 0 (NPRS)

## 2020-07-31 NOTE — DISCHARGE INSTRUCTIONS
ACTIVITY: Rest and take it easy for the first 24 hours.  A responsible adult is recommended to remain with you during that time.  It is normal to feel sleepy.  We encourage you to not do anything that requires balance, judgment or coordination.    MILD FLU-LIKE SYMPTOMS ARE NORMAL. YOU MAY EXPERIENCE GENERALIZED MUSCLE ACHES, THROAT IRRITATION, HEADACHE AND/OR SOME NAUSEA.    FOR 24 HOURS DO NOT:  Drive, operate machinery or run household appliances.  Drink beer or alcoholic beverages.   Make important decisions or sign legal documents.    SPECIAL INSTRUCTIONS: DISCHARGE INSTRUCTIONS:     ACTIVITY:  The patient should remain full weightbearing with the use of gait aids (crutches, walker, cane, etc).     PAIN CONTROL:  The patient has been prescribed a narcotic pain medication.  Side effects of narcotics pain medications include sedation and constipation.  To prevent constipation, it is recommended that the patient take an over the counter stool softener (such as Colace or Senna) and use laxatives as needed to prevent constipation.  Take these over the counter medications as directed by the packaging.  The patient may not drive while taking narcotic pain medication.  The patient should wean off their prescription pain medication by taking over the counter analgesics (such as Tylenol, Advil, Ibuprofen, etc).  Use caution when taking acetaminophen (Tylenol), as some narcotic medications contain acetaminophen.  The total dose of acetaminophen should not exceed 3000 mg daily.     WOUND CARE:  The patient has a surgical wound which was closed with staples or sutures.  These need to be removed 10-14 days after surgery.  If the patient is not in a splint or cast, the operative dressing should be removed 2 days after surgery, and dry gauze dressing changes should be performed daily after that.  You may shower when the operative dressing is removed.     SPLINT/CAST CARE:  If present, you should keep your splint or cast  clean, dry, and intact.  You should elevate your operative extremity to minimize swelling in your fingers or toes.  If the sensation in your fingers or toes of your operative extremity changes, or the fingers/toes change colors, or should your pain become too difficult to control, you should immediately elevate your operative extremity.  If these symptoms do not resolve after 30 minutes to 1 hour, you should seek medical care immediately.     CALL YOUR DOCTOR IF:  You have fever >101.5 degrees F, you have increased or concerning wound drainage, you notice a great deal of redness around your incision, your pain can no longer be controlled, the sensation in your fingers or toes changes and does not respond to elevation, your cast or splint becomes saturated (wet) or other concerns.  If you suffer a medical emergency, seek immediate medical care at your nearest Emergency Room.     DIET: To avoid nausea, slowly advance diet as tolerated, avoiding spicy or greasy foods for the first day.  Add more substantial food to your diet according to your physician's instructions.  Babies can be fed formula or breast milk as soon as they are hungry.  INCREASE FLUIDS AND FIBER TO AVOID CONSTIPATION.    SURGICAL DRESSING/BATHING: *keep dressing clean and dry*    FOLLOW-UP APPOINTMENT:  A follow-up appointment should be arranged with your doctor; call to schedule.    You should CALL YOUR PHYSICIAN if you develop:  Fever greater than 101 degrees F.  Pain not relieved by medication, or persistent nausea or vomiting.  Excessive bleeding (blood soaking through dressing) or unexpected drainage from the wound.  Extreme redness or swelling around the incision site, drainage of pus or foul smelling drainage.  Inability to urinate or empty your bladder within 8 hours.  Problems with breathing or chest pain.    You should call 911 if you develop problems with breathing or chest pain.  If you are unable to contact your doctor or surgical center,  you should go to the nearest emergency room or urgent care center.  Physician's telephone #: *Dr. Saucedo [phone]*    If any questions arise, call your doctor.  If your doctor is not available, please feel free to call the Surgical Center at [phone].  The Center is open Monday through Friday from 7AM to 7PM.  You can also call the HEALTH HOTLINE open 24 hours/day, 7 days/week and speak to a nurse at [phone], or toll free at [phone].    A registered nurse may call you a few days after your surgery to see how you are doing after your procedure.    MEDICATIONS: Resume taking daily medication.  Take prescribed pain medication with food.  If no medication is prescribed, you may take non-aspirin pain medication if needed.  PAIN MEDICATION CAN BE VERY CONSTIPATING.  Take a stool softener or laxative such as senokot, pericolace, or milk of magnesia if needed.    Last dose of pain medication given at 9:03am. Okay to take over the counter medication for pain.    If your physician has prescribed pain medication that includes Acetaminophen (Tylenol), do not take additional Acetaminophen (Tylenol) while taking the prescribed medication.    Depression / Suicide Risk    As you are discharged from this [facility] facility, it is important to learn how to keep safe from harming yourself.    Recognize the warning signs:  · Abrupt changes in personality, positive or negative- including increase in energy   · Giving away possessions  · Change in eating patterns- significant weight changes-  positive or negative  · Change in sleeping patterns- unable to sleep or sleeping all the time   · Unwillingness or inability to communicate  · Depression  · Unusual sadness, discouragement and loneliness  · Talk of wanting to die  · Neglect of personal appearance   · Rebelliousness- reckless behavior  · Withdrawal from people/activities they love  · Confusion- inability to concentrate     If you or a loved one observes  any of these behaviors or has concerns about self-harm, here's what you can do:  · Talk about it- your feelings and reasons for harming yourself  · Remove any means that you might use to hurt yourself (examples: pills, rope, extension cords, firearm)  · Get professional help from the community (Mental Health, Substance Abuse, psychological counseling)  · Do not be alone:Call your Safe Contact- someone whom you trust who will be there for you.  · Call your local CRISIS HOTLINE 833-0094 or 484-726-2896  · Call your local Children's Mobile Crisis Response Team [place] (610) 061-1737 or www.[employer]  · Call the toll free National Suicide Prevention Hotlines   · National Suicide Prevention Lifeline 884-464-IGOU (2353)  · National Hope Line Network 800-SUICIDE (913-6633)

## 2020-07-31 NOTE — ANESTHESIA QCDR
2019 [facility] Clinical Data Registry (for Quality Improvement)     Postoperative nausea/vomiting risk protocol (Adult = 18 yrs and Pediatric 3-17 yrs)- (430 and 463)  General inhalation anesthetic (NOT TIVA) with PONV risk factors: Yes  Provision of anti-emetic therapy with at least 2 different classes of agents: Yes   Patient DID NOT receive anti-emetic therapy and reason is documented in Medical Record:  N/A    Multimodal Pain Management- (477)  Non-emergent surgery AND patient age >= 18: Yes  Use of Multimodal Pain Management, two or more drugs and/or interventions, NOT including systemic opioids: No  Exception: Documented allergy to multiple classes of analgesics: No       Smoking Abstinence (404)  Patient is current smoker (cigarette, pipe, e-cig, marijuanna): No  Elective Surgery:   Abstinence instructions provided prior to day of surgery:   Patient abstained from smoking on day of surgery:     Pre-Op Beta-Blocker in Isolated CABG (44)  Isolated CABG AND patient age >= 18: No  Beta-blocker admin within 24 hours of surgical incision:   Exception:of medical reason(s) for not administering beta blocker within 24 hours prior to surgical incision (e.g., not  indicated,other medical reason):     PACU assessment of acute postoperative pain prior to Anesthesia Care End- Applies to Patients Age = 18- (ABG7)  Initial PACU pain score is which of the following: < 7/10  Patient unable to report pain score: N/A    Post-anesthetic transfer of care checklist/protocol to PACU/ICU- (426 and 427)  Upon conclusion of case, patient transferred to which of the following locations: PACU/Non-ICU  Use of transfer checklist/protocol: Yes  Exclusion: Service Performed in Patient Hospital Room (and thus did not require transfer): N/A  Unplanned admission to ICU related to anesthesia service up through end of PACU care- (MD51)  Unplanned admission to ICU (not initially anticipated at anesthesia start time): No

## 2020-07-31 NOTE — OP REPORT
DATE OF SERVICE:  07/31/2020    PREOPERATIVE DIAGNOSES:    1.  Right anterior knee hematoma.    2.  Left anterior knee hematoma.      POSTOPERATIVE DIAGNOSES:    1.  Right anterior knee hematoma.    2.  Left anterior knee hematoma.      PROCEDURES:    1.  Right knee prepatellar bursectomy.    2.  Left knee prepatellar bursectomy.      NAME OF SURGEON:  Roosevelt Zayas MD    ASSISTANT:  Xander Villavicencio PA-C    ESTIMATED BLOOD LOSS:  Minimal.      INDICATIONS:  This is a really nice 84-year-old female who is on chronic   Xarelto therapy, who fell several days ago and sustained bilateral knee large   hematomas with impending skin necrosis.  Risks and benefits of irrigation and   debridement were discussed, which include but not limited to bleeding,   infection, neurovascular damage, pain, stiffness, and need for further   surgery.  She understands all these risks and wished to proceed.      DESCRIPTION OF PROCEDURE:  Patient was sedated with LMA anesthesia and   administered preoperative antibiotics.  Right knee was prepped and draped in   usual sterile fashion.  Incision was made right over the prepatellar bursa.  A   large amount of hematoma was expressed.  The prepatellar bursa was excised   completely en bloc with a knife and rongeur.  Wounds were irrigated, closed   with nylon suture.  Sterile dressing was applied.  Attention was then turned   to the left knee where another incision was made through her previous total   knee incision and a large amount of hematoma was expressed.  This was sent for   culture and sensitivity.  The prepatellar bursa was excised.  Wounds were   irrigated, closed with nylon suture.  Sterile dressing was applied.  Patient   tolerated the procedure well.      POSTOPERATIVE PLAN:  This patient will be discharged home, weightbearing as   tolerated.  Follow up in 2 weeks' time for wound check.       ____________________________________     ROOSEVELT ZAYAS MD    MAG[facility] / NTS    DD:   07/31/2020 08:41:08  DT:  07/31/2020 10:16:27    D#:  [id number]  Job#:  [id number]

## 2020-07-31 NOTE — ANESTHESIA TIME REPORT
Anesthesia Start and Stop Event Times     Date Time Event    7/31/2020 0758 Ready for Procedure     0814 Anesthesia Start     0846 Anesthesia Stop        Responsible Staff  07/31/20    Name Role Begin End    Maco Holley M.D. Anesth 0814 0846        Preop Diagnosis (Free Text):  Pre-op Diagnosis     INFECTION OF PREPATELLAR BURSA LEFT KNEE        Preop Diagnosis (Codes):    Post op Diagnosis  Traumatic hematoma of knee, sequela      Premium Reason  Non-Premium    Comments:

## 2020-08-03 LAB
BACTERIA WND AEROBE CULT: NORMAL
BACTERIA WND AEROBE CULT: NORMAL
GRAM STN SPEC: NORMAL
GRAM STN SPEC: NORMAL
SIGNIFICANT IND 70042: NORMAL
SIGNIFICANT IND 70042: NORMAL
SITE SITE: NORMAL
SITE SITE: NORMAL
SOURCE SOURCE: NORMAL
SOURCE SOURCE: NORMAL

## 2020-08-05 LAB
BACTERIA SPEC ANAEROBE CULT: NORMAL
BACTERIA SPEC ANAEROBE CULT: NORMAL
SIGNIFICANT IND 70042: NORMAL
SIGNIFICANT IND 70042: NORMAL
SITE SITE: NORMAL
SITE SITE: NORMAL
SOURCE SOURCE: NORMAL
SOURCE SOURCE: NORMAL

## 2020-08-14 ENCOUNTER — TELEPHONE (OUTPATIENT)
Dept: VASCULAR LAB | Facility: MEDICAL CENTER | Age: 85
End: 2020-08-14

## 2020-08-14 ENCOUNTER — TELEPHONE (OUTPATIENT)
Dept: MEDICAL GROUP | Facility: PHYSICIAN GROUP | Age: 85
End: 2020-08-14

## 2020-08-14 NOTE — TELEPHONE ENCOUNTER
Patient called and states that she is going to have a colonoscopy on Monday.  She states that [facility] has been trying to get a hold of us for the directions on the Xarelto.

## 2020-08-14 NOTE — TELEPHONE ENCOUNTER
I called [facility] and spoke to Mohini.  She is going to fax us over paperwork for this.  Once received, I will call the patient and notify her of the update.

## 2020-08-15 NOTE — TELEPHONE ENCOUNTER
S/w patient today regarding upcoming colonoscopy on 8-17-20    Per the 2017 ACC Expert Consensus Decision Pathway for Periprocedural Management of Anticoagulation in Patients With Nonvalvular Atrial Fibrillation (see document below) - it would be recommended to have pt hold Xarelto 48 hours prior to procedure.  She voices understanding of these instructions.    Steve Hahn, PharmD, BCACP

## 2020-09-14 ENCOUNTER — APPOINTMENT (RX ONLY)
Dept: URBAN - METROPOLITAN AREA CLINIC 4 | Facility: CLINIC | Age: 85
Setting detail: DERMATOLOGY
End: 2020-09-14

## 2020-09-14 DIAGNOSIS — Z85.828 PERSONAL HISTORY OF OTHER MALIGNANT NEOPLASM OF SKIN: ICD-10-CM

## 2020-09-14 DIAGNOSIS — L82.0 INFLAMED SEBORRHEIC KERATOSIS: ICD-10-CM

## 2020-09-14 DIAGNOSIS — Z86.006 PERSONAL HISTORY OF MELANOMA IN-SITU: ICD-10-CM

## 2020-09-14 DIAGNOSIS — L82.1 OTHER SEBORRHEIC KERATOSIS: ICD-10-CM

## 2020-09-14 DIAGNOSIS — L57.8 OTHER SKIN CHANGES DUE TO CHRONIC EXPOSURE TO NONIONIZING RADIATION: ICD-10-CM

## 2020-09-14 DIAGNOSIS — L81.4 OTHER MELANIN HYPERPIGMENTATION: ICD-10-CM

## 2020-09-14 DIAGNOSIS — Z85.820 PERSONAL HISTORY OF MALIGNANT MELANOMA OF SKIN: ICD-10-CM

## 2020-09-14 DIAGNOSIS — D18.0 HEMANGIOMA: ICD-10-CM

## 2020-09-14 DIAGNOSIS — L57.0 ACTINIC KERATOSIS: ICD-10-CM

## 2020-09-14 DIAGNOSIS — D22 MELANOCYTIC NEVI: ICD-10-CM

## 2020-09-14 PROBLEM — D22.5 MELANOCYTIC NEVI OF TRUNK: Status: ACTIVE | Noted: 2020-09-14

## 2020-09-14 PROBLEM — D18.01 HEMANGIOMA OF SKIN AND SUBCUTANEOUS TISSUE: Status: ACTIVE | Noted: 2020-09-14

## 2020-09-14 PROCEDURE — 99214 OFFICE O/P EST MOD 30 MIN: CPT | Mod: 25

## 2020-09-14 PROCEDURE — 17000 DESTRUCT PREMALG LESION: CPT | Mod: 59

## 2020-09-14 PROCEDURE — 17003 DESTRUCT PREMALG LES 2-14: CPT | Mod: 59

## 2020-09-14 PROCEDURE — ? LIQUID NITROGEN

## 2020-09-14 PROCEDURE — 17110 DESTRUCTION B9 LES UP TO 14: CPT

## 2020-09-14 PROCEDURE — ? COUNSELING

## 2020-09-14 ASSESSMENT — LOCATION DETAILED DESCRIPTION DERM
LOCATION DETAILED: LEFT LATERAL FOREHEAD
LOCATION DETAILED: RIGHT UPPER BACK
LOCATION DETAILED: LEFT UPPER BACK
LOCATION DETAILED: RIGHT DORSAL HAND
LOCATION DETAILED: RIGHT SUPERIOR FOREHEAD
LOCATION DETAILED: LEFT MEDIAL MALAR CHEEK
LOCATION DETAILED: RIGHT NASAL ALA
LOCATION DETAILED: STERNAL NOTCH
LOCATION DETAILED: RIGHT INFERIOR ANTERIOR NECK
LOCATION DETAILED: LEFT CENTRAL EYEBROW
LOCATION DETAILED: RIGHT MEDIAL CANTHUS
LOCATION DETAILED: RIGHT LATERAL SUPERIOR CHEST
LOCATION DETAILED: RIGHT CHEEK
LOCATION DETAILED: UPPER STERNUM
LOCATION DETAILED: RIGHT ANTERIOR THIGH
LOCATION DETAILED: LEFT MEDIAL SUPERIOR CHEST
LOCATION DETAILED: LEFT SUPERIOR FOREHEAD
LOCATION DETAILED: MID POSTERIOR NECK
LOCATION DETAILED: RIGHT DORSAL FOREARM
LOCATION DETAILED: NASAL DORSUM
LOCATION DETAILED: LEFT DORSAL FOREARM
LOCATION DETAILED: LEFT CHEEK
LOCATION DETAILED: LEFT UPPER CUTANEOUS LIP
LOCATION DETAILED: LEFT SUPERIOR PARIETAL SCALP
LOCATION DETAILED: RIGHT DISTAL PRETIBIAL REGION
LOCATION DETAILED: LEFT LATERAL INFERIOR EYELID
LOCATION DETAILED: LEFT CENTRAL PARIETAL SCALP
LOCATION DETAILED: LEFT LATERAL SUPERIOR CHEST
LOCATION DETAILED: LEFT MID BACK
LOCATION DETAILED: RIGHT INFERIOR TEMPLE
LOCATION DETAILED: LEFT ANTERIOR THIGH
LOCATION DETAILED: LEFT INFERIOR POSTAURICULAR SKIN
LOCATION DETAILED: RIGHT ULNAR DORSAL HAND
LOCATION DETAILED: LEFT DORSAL HAND

## 2020-09-14 ASSESSMENT — LOCATION ZONE DERM
LOCATION ZONE: NOSE
LOCATION ZONE: LIP
LOCATION ZONE: ARM
LOCATION ZONE: EYELID
LOCATION ZONE: HAND
LOCATION ZONE: FACE
LOCATION ZONE: SCALP
LOCATION ZONE: LEG
LOCATION ZONE: TRUNK
LOCATION ZONE: NECK

## 2020-09-14 ASSESSMENT — LOCATION SIMPLE DESCRIPTION DERM
LOCATION SIMPLE: LEFT CHEEK
LOCATION SIMPLE: RIGHT CHEEK
LOCATION SIMPLE: BACK
LOCATION SIMPLE: NOSE
LOCATION SIMPLE: LEFT LOWER EXTREMITY
LOCATION SIMPLE: RIGHT TEMPLE
LOCATION SIMPLE: RIGHT EYELID
LOCATION SIMPLE: RIGHT LOWER EXTREMITY
LOCATION SIMPLE: LEFT INFERIOR EYELID
LOCATION SIMPLE: SCALP
LOCATION SIMPLE: LEFT UPPER EXTREMITY
LOCATION SIMPLE: LEFT FOREHEAD
LOCATION SIMPLE: LEFT LIP
LOCATION SIMPLE: RIGHT UPPER EXTREMITY
LOCATION SIMPLE: RIGHT NOSE
LOCATION SIMPLE: CHEST
LOCATION SIMPLE: LEFT HAND
LOCATION SIMPLE: POSTERIOR NECK
LOCATION SIMPLE: RIGHT HAND
LOCATION SIMPLE: LEFT EYEBROW
LOCATION SIMPLE: RIGHT FOREHEAD
LOCATION SIMPLE: RIGHT PRETIBIAL REGION
LOCATION SIMPLE: RIGHT ANTERIOR NECK

## 2020-09-14 NOTE — PROCEDURE: MIPS QUALITY
Quality 226: Preventive Care And Screening: Tobacco Use: Screening And Cessation Intervention: Patient screened for tobacco use and is an ex/non-smoker
Quality 137: Melanoma: Continuity Of Care - Recall System: Patient information entered into a recall system that includes: target date for the next exam specified AND a process to follow up with patients regarding missed or unscheduled appointments
Quality 130: Documentation Of Current Medications In The Medical Record: Current Medications Documented
Detail Level: Detailed
Quality 111:Pneumonia Vaccination Status For Older Adults: Pneumococcal Vaccination Previously Received
Quality 138: Melanoma: Coordination Of Care: A treatment plan was communicated to the physicians providing continuing care within one month of diagnosis outlining: diagnosis, tumor thickness and a plan for surgery or alternate care.

## 2020-09-14 NOTE — PROCEDURE: REASSURANCE
Detail Level: Detailed
Quality 224: Stage 0-Iic Melanoma: Overutilization Of Imaging Studies For Only Stage 0-Iic Melanoma: None of the following diagnostic imaging studies ordered: chest X-ray, CT, Ultrasound, MRI, PET, or nuclear medicine scans (ML)
Quality 137: Melanoma: Continuity Of Care - Recall System: Patient information entered into a recall system that includes: target date for the next exam specified AND a process to follow up with patients regarding missed or unscheduled appointments
When Should The Patient Follow-Up For Their Next Full-Body Skin Exam?: 3 Months

## 2020-09-14 NOTE — HPI: EVALUATION OF SKIN LESION(S)
How Severe Are Your Spot(S)?: mild
Have Your Spot(S) Been Treated In The Past?: has not been treated
Hpi Title: Evaluation of Skin Lesions
Location: Pretibial region
Year Removed: 2020

## 2020-09-14 NOTE — PROCEDURE: LIQUID NITROGEN
Render Note In Bullet Format When Appropriate: No
Detail Level: Detailed
Post-Care Instructions: I reviewed with the patient in detail post-care instructions. Patient is to wear sunprotection, and avoid picking at any of the treated lesions. Pt may apply Vaseline to crusted or scabbing areas.
Aperture Size (Optional): C
Duration Of Freeze Thaw-Cycle (Seconds): 3
Medical Necessity Clause: This procedure was medically necessary because the lesions that were treated were:
Number Of Freeze-Thaw Cycles: 1 freeze-thaw cycle
Consent: The patient's consent was obtained including but not limited to risks of crusting, scabbing, blistering, scarring, darker or lighter pigmentary change, recurrence, incomplete removal and infection.
Medical Necessity Information: It is in your best interest to select a reason for this procedure from the list below. All of these items fulfill various CMS LCD requirements except the new and changing color options.
Detail Level: Simple
Number Of Freeze-Thaw Cycles: 2 freeze-thaw cycles

## 2020-09-24 ENCOUNTER — HOSPITAL ENCOUNTER (OUTPATIENT)
Dept: RADIOLOGY | Facility: MEDICAL CENTER | Age: 85
End: 2020-09-24
Attending: PHYSICAL MEDICINE & REHABILITATION
Payer: MEDICARE

## 2020-09-24 DIAGNOSIS — G64 PERIPHERAL NERVOUS SYSTEM DISEASE: ICD-10-CM

## 2020-09-24 DIAGNOSIS — M79.604 BILATERAL LEG PAIN: ICD-10-CM

## 2020-09-24 DIAGNOSIS — M79.605 BILATERAL LOWER EXTREMITY PAIN: ICD-10-CM

## 2020-09-24 DIAGNOSIS — M79.604 BILATERAL LOWER EXTREMITY PAIN: ICD-10-CM

## 2020-09-24 DIAGNOSIS — M79.605 BILATERAL LEG PAIN: ICD-10-CM

## 2020-09-24 PROCEDURE — 93922 UPR/L XTREMITY ART 2 LEVELS: CPT

## 2020-10-06 DIAGNOSIS — Z79.899 ENCOUNTER FOR MONITORING DENOSUMAB THERAPY: ICD-10-CM

## 2020-10-06 DIAGNOSIS — Z51.81 ENCOUNTER FOR MONITORING DENOSUMAB THERAPY: ICD-10-CM

## 2020-10-14 ENCOUNTER — HOSPITAL ENCOUNTER (OUTPATIENT)
Dept: LAB | Facility: MEDICAL CENTER | Age: 85
End: 2020-10-14
Attending: INTERNAL MEDICINE
Payer: MEDICARE

## 2020-10-14 LAB
ALBUMIN SERPL BCP-MCNC: 4 G/DL (ref 3.2–4.9)
ALBUMIN/GLOB SERPL: 1.5 G/DL
ALP SERPL-CCNC: 49 U/L (ref 30–99)
ALT SERPL-CCNC: 17 U/L (ref 2–50)
ANION GAP SERPL CALC-SCNC: 9 MMOL/L (ref 7–16)
AST SERPL-CCNC: 19 U/L (ref 12–45)
BASOPHILS # BLD AUTO: 0.7 % (ref 0–1.8)
BASOPHILS # BLD: 0.04 K/UL (ref 0–0.12)
BILIRUB SERPL-MCNC: 0.5 MG/DL (ref 0.1–1.5)
BUN SERPL-MCNC: 18 MG/DL (ref 8–22)
CALCIUM SERPL-MCNC: 9.1 MG/DL (ref 8.5–10.5)
CHLORIDE SERPL-SCNC: 104 MMOL/L (ref 96–112)
CHOLEST SERPL-MCNC: 162 MG/DL (ref 100–199)
CO2 SERPL-SCNC: 30 MMOL/L (ref 20–33)
CREAT SERPL-MCNC: 0.75 MG/DL (ref 0.5–1.4)
EOSINOPHIL # BLD AUTO: 0.17 K/UL (ref 0–0.51)
EOSINOPHIL NFR BLD: 3.1 % (ref 0–6.9)
ERYTHROCYTE [DISTWIDTH] IN BLOOD BY AUTOMATED COUNT: 44.2 FL (ref 35.9–50)
FASTING STATUS PATIENT QL REPORTED: NORMAL
GLOBULIN SER CALC-MCNC: 2.7 G/DL (ref 1.9–3.5)
GLUCOSE SERPL-MCNC: 92 MG/DL (ref 65–99)
HCT VFR BLD AUTO: 45.9 % (ref 37–47)
HDLC SERPL-MCNC: 62 MG/DL
HGB BLD-MCNC: 14.9 G/DL (ref 12–16)
IMM GRANULOCYTES # BLD AUTO: 0.01 K/UL (ref 0–0.11)
IMM GRANULOCYTES NFR BLD AUTO: 0.2 % (ref 0–0.9)
LDLC SERPL CALC-MCNC: 83 MG/DL
LYMPHOCYTES # BLD AUTO: 1.63 K/UL (ref 1–4.8)
LYMPHOCYTES NFR BLD: 29.3 % (ref 22–41)
MCH RBC QN AUTO: 31.1 PG (ref 27–33)
MCHC RBC AUTO-ENTMCNC: 32.5 G/DL (ref 33.6–35)
MCV RBC AUTO: 95.8 FL (ref 81.4–97.8)
MONOCYTES # BLD AUTO: 0.47 K/UL (ref 0–0.85)
MONOCYTES NFR BLD AUTO: 8.4 % (ref 0–13.4)
NEUTROPHILS # BLD AUTO: 3.25 K/UL (ref 2–7.15)
NEUTROPHILS NFR BLD: 58.3 % (ref 44–72)
NRBC # BLD AUTO: 0 K/UL
NRBC BLD-RTO: 0 /100 WBC
PLATELET # BLD AUTO: 177 K/UL (ref 164–446)
PMV BLD AUTO: 12.4 FL (ref 9–12.9)
POTASSIUM SERPL-SCNC: 4.3 MMOL/L (ref 3.6–5.5)
PROT SERPL-MCNC: 6.7 G/DL (ref 6–8.2)
RBC # BLD AUTO: 4.79 M/UL (ref 4.2–5.4)
SODIUM SERPL-SCNC: 143 MMOL/L (ref 135–145)
T4 FREE SERPL-MCNC: 1.02 NG/DL (ref 0.93–1.7)
TRIGL SERPL-MCNC: 85 MG/DL (ref 0–149)
WBC # BLD AUTO: 5.6 K/UL (ref 4.8–10.8)

## 2020-10-14 PROCEDURE — 80061 LIPID PANEL: CPT

## 2020-10-14 PROCEDURE — 84439 ASSAY OF FREE THYROXINE: CPT

## 2020-10-14 PROCEDURE — 85025 COMPLETE CBC W/AUTO DIFF WBC: CPT

## 2020-10-14 PROCEDURE — 36415 COLL VENOUS BLD VENIPUNCTURE: CPT

## 2020-10-14 PROCEDURE — 80053 COMPREHEN METABOLIC PANEL: CPT

## 2020-11-17 ENCOUNTER — HOSPITAL ENCOUNTER (OUTPATIENT)
Dept: RADIOLOGY | Facility: MEDICAL CENTER | Age: 85
End: 2020-11-17
Payer: MEDICARE

## 2020-11-17 DIAGNOSIS — M54.40 LOW BACK PAIN WITH SCIATICA, SCIATICA LATERALITY UNSPECIFIED, UNSPECIFIED BACK PAIN LATERALITY, UNSPECIFIED CHRONICITY: ICD-10-CM

## 2020-11-17 PROCEDURE — 72110 X-RAY EXAM L-2 SPINE 4/>VWS: CPT

## 2020-11-30 ENCOUNTER — ANTICOAGULATION VISIT (OUTPATIENT)
Dept: MEDICAL GROUP | Facility: PHYSICIAN GROUP | Age: 85
End: 2020-11-30
Payer: MEDICARE

## 2020-11-30 DIAGNOSIS — Z79.01 LONG TERM (CURRENT) USE OF ANTICOAGULANTS: Primary | ICD-10-CM

## 2020-11-30 DIAGNOSIS — I48.91 ATRIAL FIBRILLATION, UNSPECIFIED TYPE (HCC): ICD-10-CM

## 2020-11-30 DIAGNOSIS — I82.5Y2 CHRONIC DEEP VEIN THROMBOSIS (DVT) OF PROXIMAL VEIN OF LEFT LOWER EXTREMITY (HCC): ICD-10-CM

## 2020-11-30 LAB — INR PPP: 1.2 (ref 2–3.5)

## 2020-11-30 PROCEDURE — 99211 OFF/OP EST MAY X REQ PHY/QHP: CPT | Performed by: INTERNAL MEDICINE

## 2020-11-30 PROCEDURE — 85610 PROTHROMBIN TIME: CPT | Mod: QW | Performed by: FAMILY MEDICINE

## 2020-11-30 NOTE — PROGRESS NOTES
Anticoagulation Summary  As of 2020    INR goal:     TTR:  0.0 % (5.7 mo)   INR used for dosin.20 (2020)   Warfarin maintenance plan:  No maintenance plan   Plan last modified:  Steve Hahn, PharmD (2020)   Next INR check:  2021   Target end date:  Indefinite    Indications    Long term (current) use of anticoagulants [Z79.01]  Deep vein thrombosis (HCC) [I82.409]  Atrial fibrillation [I48.91] [I48.91]             Anticoagulation Episode Summary     INR check location:      Preferred lab:      Send INR reminders to:      Comments:        Anticoagulation Care Providers     Provider Role Specialty Phone number    Davon Waters M.D. Referring Internal Medicine [phone]    [facility] Responsible  [phone]        Anticoagulation Patient Findings                Target end date:Indefinite     Indication: atrial fibrillation      Drug: Xarelto     CHADsVASC = at least 6    Health Status Since Last Assessment   Patient denies any new relevant m edical problems, ED visits or hospitalizations   Patient denies any embolic events (stroke/tia/systemic embolism)    Adherence with DOAC Therapy   Pt has NO missed any doses in the average week    Bleeding Risk Assessment     Denies Epistaxis   Pt denies any excessive or unusual bleeding/hematomas.  Pt denies any GI bleeds or hematemesis.  Pt denies any concerning daily headache or sub dural hematoma symptoms.     Pt denies any hematuria or abnormal vaginal bleeding.   Latest Hemoglobin 14.9   ETOH overuse Negative     Creatinine Clearance/Renal Function     Latest ClCr 68.1 mL/min    Hepatic function   Latest LFTs WNL   Pt denies any history of liver dysfunction      Drug Interactions   Platelets: 177   ASA/other antiplatelets Negative   NSAID Negative   Other drug interactions Negative   X Verified no anticonvulsant or azole therapy, education provided for future use.     Examination   Blood Pressure WNL   Symptomatic  hypotension Negative   Significant gait impairment/imbalance/high risk for falls? Uses cane for unsteady gait, denies any recent falls.    Final Assessment and Recommendations:   Patient appears stable from the anticoagulation standpoint.     Benefits of continued DOAC therapy outweigh risks for this patient   Recommend pt continue with current DOAC therapy Xarelto 20mg daily.      Other Actions: cmp/ cbc hemogram ordered prior to next visit    Follow up:   Will follow up with patient 6 months.      Steve Hahn, PharmD, BCACP

## 2020-12-01 ENCOUNTER — OFFICE VISIT (OUTPATIENT)
Dept: MEDICAL GROUP | Facility: PHYSICIAN GROUP | Age: 85
End: 2020-12-01
Payer: MEDICARE

## 2020-12-01 VITALS
SYSTOLIC BLOOD PRESSURE: 122 MMHG | WEIGHT: 174 LBS | RESPIRATION RATE: 16 BRPM | DIASTOLIC BLOOD PRESSURE: 74 MMHG | HEIGHT: 64 IN | OXYGEN SATURATION: 96 % | BODY MASS INDEX: 29.71 KG/M2 | HEART RATE: 74 BPM | TEMPERATURE: 97.5 F

## 2020-12-01 DIAGNOSIS — B35.1 ONYCHOMYCOSIS: ICD-10-CM

## 2020-12-01 DIAGNOSIS — Z79.899 MEDICATION MANAGEMENT: ICD-10-CM

## 2020-12-01 PROCEDURE — 99214 OFFICE O/P EST MOD 30 MIN: CPT | Performed by: NURSE PRACTITIONER

## 2020-12-01 RX ORDER — TERBINAFINE HYDROCHLORIDE 250 MG/1
250 TABLET ORAL DAILY
Qty: 90 TAB | Refills: 0 | Status: SHIPPED | OUTPATIENT
Start: 2020-12-01 | End: 2021-08-13

## 2020-12-01 ASSESSMENT — FIBROSIS 4 INDEX: FIB4 SCORE: 2.19

## 2020-12-01 NOTE — PROGRESS NOTES
"Subjective:     CC: fingernail problem    HPI:   Marry presents today with the following:     Onychomycosis  Chronic medical problem. She was previously seen in March 2020 but did not follow up.  She was previously prescribed terbinafine for 45 days.  She states she tolerate the medication at that time.  She has not followed up since then.  She is interested in starting medication again.  She continues to have fingernail fungus to all 10 fingers.  She does not like the way her fingernail fungus looks.  She is trying topicals.  She followed up with her pharmacist who recommended terbinafine for treatment.      Past Medical History:   Diagnosis Date   • Adenocarcinoma of colon (HCC) 10/30/2018    From sessile serrated polyp 10/2018   • Anesthesia     pt states \"one new dr scraped my esophagus when they put the tube in and I started bleeding so they couldn't operate\"    • Atrial fibrillation [I48.91] 4/19/2016     pt denies    • Back pain 6/27/2012   • Blood clotting disorder (HCC) 2012    clot in leg   • Bowel habit changes     constipation    • Cancer (HCC)     skin, colon 2018   • Cataract     belia IOL    • Chronic back pain greater than 3 months duration    • Deep vein thrombosis (HCC) 3/8/2016    First occurrence in LLE in late 1970s Second occurrence further up in LLE in 2012, has been on AC since    • Essential hypertension, benign 6/27/2012   • GERD (gastroesophageal reflux disease) 6/27/2012   • Heart murmur    • Hyperlipidemia    • Hypertension     hx of, not currently    • Impaired fasting glucose 11/2/2017   • Mild aortic stenosis 11/2/2017   • Other and unspecified hyperlipidemia 6/27/2012   • Prediabetes    • Protein S deficiency (HCC) 11/2/2017    Noted in lab work 2013 as a part of work up at [facility]    • Rheumatoid arthritis involving multiple sites with positive rheumatoid factor (HCC) 03/14/2016    Dr. Escoto   • Rheumatoid nodule (HCC) 7/25/2017   • Right bundle branch block 6/27/2012    pt " denies    • Urinary incontinence 11/2/2017       Social History     Tobacco Use   • Smoking status: Never Smoker   • Smokeless tobacco: Never Used   Substance Use Topics   • Alcohol use: Not Currently     Alcohol/week: 0.0 oz     Comment: occ   • Drug use: No       Current Outpatient Medications Ordered in Epic   Medication Sig Dispense Refill   • terbinafine (LAMISIL) 250 MG Tab Take 1 Tab by mouth every day. 90 Tab 0   • lidocaine (LIDODERM) 5 % Patch Apply 1 Patch to skin as directed every 24 hours. 90 Patch 0   • hydroxychloroquine (PLAQUENIL) 200 MG Tab TAKE ONE BY MOUTH TWICE DAILY 180 Tab 0   • rivaroxaban (XARELTO) 20 MG Tab tablet Take 1 Tab by mouth with dinner. 90 Tab 1   • atorvastatin (LIPITOR) 10 MG Tab Take 1 Tab by mouth every evening. 90 Tab 3   • LYRICA 100 MG Cap Take 100 mg by mouth 2 times a day.     • omeprazole (PRILOSEC) 20 MG delayed-release capsule Take 20 mg by mouth every day.     • Diphenhydramine-APAP, sleep, (TYLENOL PM EXTRA STRENGTH)  MG Tab Take 2 Tabs by mouth every bedtime.     • acetaminophen (TYLENOL) 500 MG Tab Take 1,000 mg by mouth 2 Times a Day.     • Melatonin 10 MG Tab Take 1 Tab by mouth every bedtime.     • vitamin D (CHOLECALCIFEROL) 1000 UNIT Tab Take 1,000 Units by mouth every morning.     • Calcium Carbonate-Vitamin D (CALCIUM + D PO) Take 1 Tab by mouth every bedtime.       Current Facility-Administered Medications Ordered in Epic   Medication Dose Route Frequency Provider Last Rate Last Admin   • denosumab (PROLIA) subq injection 60 mg  60 mg Subcutaneous Q6 MO Deanna Escoto M.D.   60 mg at 06/11/20 1129       Allergies:  Sulfa drugs    Health Maintenance: Due for AWV and zoster vaccine. Declines today.     ROS:  Gen: no fevers/chills, no changes in weight  Eyes: no blurry vision  ENT: no sore throat  Pulm: no sob, no cough  CV: no chest pain, no palpitations  GI: no nausea/vomiting, no abdominal pain  MSk: no myalgias  Skin: no rash, no jaundice  "  Neuro: no headaches, no dizziness    Objective:     Vital signs reviewed  Exam:  /74 (BP Location: Left arm, Patient Position: Sitting, BP Cuff Size: Adult)   Pulse 74   Temp 36.4 °C (97.5 °F) (Temporal)   Resp 16   Ht 1.626 m (5' 4\")   Wt 78.9 kg (174 lb)   SpO2 96%   BMI 29.87 kg/m²  Body mass index is 29.87 kg/m².    Gen: Alert and oriented, No apparent distress.  Eyes:   Lids normal. Glasses in place. No scleral icterus.   Neck: Neck is supple without lymphadenopathy.  Lungs: Normal effort, CTA bilaterally, no wheezes, rhonchi, or rales  CV: Irregular rhythm, regular rate. No murmurs, rubs, or gallops.  Ext: No clubbing, cyanosis, edema.  Hands: Distal subungal white/yellow colored nails with nail lifting from nailbed and subungual debris present under all nails.  No erythema, swelling, drainage, or pus.       Assessment & Plan:     85 y.o. female with the following -     1. Onychomycosis  Chronic unstable medical problem. Restart terbinafine treatment. She had recent labs completed 10/14/2020 that showed WNL kidney and liver function. She will repeat CMP in 1 month after starting terbinafine. She will return in 3 months to see me. Red flags discussed. Monitor and follow.   - terbinafine (LAMISIL) 250 MG Tab; Take 1 Tab by mouth every day.  Dispense: 90 Tab; Refill: 0  - Comp Metabolic Panel; Future    2. Medication management  See #1 above  - Comp Metabolic Panel; Future      Return in about 3 months (around 3/1/2021) for onychyomycosis .    Please note that this dictation was created using voice recognition software. I have made every reasonable attempt to correct obvious errors, but I expect that there are errors of grammar and possibly content that I did not discover before finalizing the note.      "

## 2020-12-01 NOTE — ASSESSMENT & PLAN NOTE
Chronic medical problem. She was previously seen in March 2020 but did not follow up.  She was previously prescribed terbinafine for 45 days.  She states she tolerate the medication at that time.  She has not followed up since then.  She is interested in starting medication again.  She continues to have fingernail fungus to all 10 fingers.  She does not like the way her fingernail fungus looks.  She is trying topicals.  She followed up with her pharmacist who recommended terbinafine for treatment.

## 2020-12-07 ENCOUNTER — APPOINTMENT (RX ONLY)
Dept: URBAN - METROPOLITAN AREA CLINIC 4 | Facility: CLINIC | Age: 85
Setting detail: DERMATOLOGY
End: 2020-12-07

## 2020-12-07 DIAGNOSIS — L82.0 INFLAMED SEBORRHEIC KERATOSIS: ICD-10-CM

## 2020-12-07 DIAGNOSIS — L82.1 OTHER SEBORRHEIC KERATOSIS: ICD-10-CM

## 2020-12-07 DIAGNOSIS — Z85.820 PERSONAL HISTORY OF MALIGNANT MELANOMA OF SKIN: ICD-10-CM

## 2020-12-07 DIAGNOSIS — L57.8 OTHER SKIN CHANGES DUE TO CHRONIC EXPOSURE TO NONIONIZING RADIATION: ICD-10-CM

## 2020-12-07 DIAGNOSIS — L81.4 OTHER MELANIN HYPERPIGMENTATION: ICD-10-CM

## 2020-12-07 DIAGNOSIS — L57.0 ACTINIC KERATOSIS: ICD-10-CM

## 2020-12-07 DIAGNOSIS — D22 MELANOCYTIC NEVI: ICD-10-CM

## 2020-12-07 DIAGNOSIS — D18.0 HEMANGIOMA: ICD-10-CM

## 2020-12-07 PROBLEM — D18.01 HEMANGIOMA OF SKIN AND SUBCUTANEOUS TISSUE: Status: ACTIVE | Noted: 2020-12-07

## 2020-12-07 PROBLEM — D22.5 MELANOCYTIC NEVI OF TRUNK: Status: ACTIVE | Noted: 2020-12-07

## 2020-12-07 PROBLEM — D48.5 NEOPLASM OF UNCERTAIN BEHAVIOR OF SKIN: Status: ACTIVE | Noted: 2020-12-07

## 2020-12-07 PROCEDURE — 11102 TANGNTL BX SKIN SINGLE LES: CPT | Mod: 59

## 2020-12-07 PROCEDURE — ? BIOPSY BY SHAVE METHOD

## 2020-12-07 PROCEDURE — ? LIQUID NITROGEN

## 2020-12-07 PROCEDURE — 99214 OFFICE O/P EST MOD 30 MIN: CPT | Mod: 25

## 2020-12-07 PROCEDURE — ? COUNSELING

## 2020-12-07 PROCEDURE — 17110 DESTRUCTION B9 LES UP TO 14: CPT

## 2020-12-07 PROCEDURE — 17003 DESTRUCT PREMALG LES 2-14: CPT | Mod: 59

## 2020-12-07 PROCEDURE — 17000 DESTRUCT PREMALG LESION: CPT | Mod: 59

## 2020-12-07 ASSESSMENT — LOCATION ZONE DERM
LOCATION ZONE: LIP
LOCATION ZONE: ARM
LOCATION ZONE: HAND
LOCATION ZONE: FACE
LOCATION ZONE: TRUNK
LOCATION ZONE: LEG
LOCATION ZONE: SCALP
LOCATION ZONE: NECK

## 2020-12-07 ASSESSMENT — LOCATION SIMPLE DESCRIPTION DERM
LOCATION SIMPLE: RIGHT UPPER EXTREMITY
LOCATION SIMPLE: RIGHT CHEEK
LOCATION SIMPLE: LEFT HAND
LOCATION SIMPLE: RIGHT CLAVICULAR SKIN
LOCATION SIMPLE: RIGHT SCALP
LOCATION SIMPLE: RIGHT ANTERIOR NECK
LOCATION SIMPLE: LEFT LOWER EXTREMITY
LOCATION SIMPLE: BACK
LOCATION SIMPLE: LEFT CLAVICULAR SKIN
LOCATION SIMPLE: LEFT UPPER EXTREMITY
LOCATION SIMPLE: RIGHT UPPER BACK
LOCATION SIMPLE: LEFT CHEEK
LOCATION SIMPLE: RIGHT LOWER EXTREMITY
LOCATION SIMPLE: RIGHT LIP
LOCATION SIMPLE: RIGHT HAND

## 2020-12-07 ASSESSMENT — LOCATION DETAILED DESCRIPTION DERM
LOCATION DETAILED: LEFT CHEEK
LOCATION DETAILED: RIGHT MID-UPPER BACK
LOCATION DETAILED: RIGHT DORSAL HAND
LOCATION DETAILED: RIGHT CENTRAL MALAR CHEEK
LOCATION DETAILED: LEFT DORSAL HAND
LOCATION DETAILED: LEFT ANTERIOR THIGH
LOCATION DETAILED: RIGHT LATERAL BUCCAL CHEEK
LOCATION DETAILED: RIGHT DORSAL FOREARM
LOCATION DETAILED: RIGHT LOWER CUTANEOUS LIP
LOCATION DETAILED: LEFT MID BACK
LOCATION DETAILED: RIGHT CLAVICULAR SKIN
LOCATION DETAILED: RIGHT CENTRAL FRONTAL SCALP
LOCATION DETAILED: LEFT UPPER BACK
LOCATION DETAILED: RIGHT UPPER BACK
LOCATION DETAILED: RIGHT SUPERIOR ANTERIOR NECK
LOCATION DETAILED: RIGHT ANTERIOR THIGH
LOCATION DETAILED: RIGHT INFERIOR LATERAL NECK
LOCATION DETAILED: RIGHT CHEEK
LOCATION DETAILED: LEFT CLAVICULAR SKIN
LOCATION DETAILED: LEFT DORSAL FOREARM

## 2020-12-07 NOTE — PROCEDURE: LIQUID NITROGEN
Render Post-Care Instructions In Note?: no
Aperture Size (Optional): C
Consent: The patient's consent was obtained including but not limited to risks of crusting, scabbing, blistering, scarring, darker or lighter pigmentary change, recurrence, incomplete removal and infection.
Detail Level: Simple
Number Of Freeze-Thaw Cycles: 2 freeze-thaw cycles
Post-Care Instructions: I reviewed with the patient in detail post-care instructions. Patient is to wear sunprotection, and avoid picking at any of the treated lesions. Pt may apply Vaseline to crusted or scabbing areas.
Duration Of Freeze Thaw-Cycle (Seconds): 3
Medical Necessity Information: It is in your best interest to select a reason for this procedure from the list below. All of these items fulfill various CMS LCD requirements except the new and changing color options.
Number Of Freeze-Thaw Cycles: 1 freeze-thaw cycle
Medical Necessity Clause: This procedure was medically necessary because the lesions that were treated were:
Detail Level: Detailed

## 2020-12-08 ENCOUNTER — HOSPITAL ENCOUNTER (OUTPATIENT)
Dept: LAB | Facility: MEDICAL CENTER | Age: 85
End: 2020-12-08
Attending: NURSE PRACTITIONER
Payer: MEDICARE

## 2020-12-08 DIAGNOSIS — Z79.899 MEDICATION MANAGEMENT: ICD-10-CM

## 2020-12-08 DIAGNOSIS — B35.1 ONYCHOMYCOSIS: ICD-10-CM

## 2020-12-08 LAB
ALBUMIN SERPL BCP-MCNC: 4 G/DL (ref 3.2–4.9)
ALBUMIN/GLOB SERPL: 1.6 G/DL
ALP SERPL-CCNC: 54 U/L (ref 30–99)
ALT SERPL-CCNC: 13 U/L (ref 2–50)
ANION GAP SERPL CALC-SCNC: 7 MMOL/L (ref 7–16)
AST SERPL-CCNC: 16 U/L (ref 12–45)
BILIRUB SERPL-MCNC: 0.5 MG/DL (ref 0.1–1.5)
BUN SERPL-MCNC: 15 MG/DL (ref 8–22)
CALCIUM SERPL-MCNC: 8.8 MG/DL (ref 8.5–10.5)
CHLORIDE SERPL-SCNC: 106 MMOL/L (ref 96–112)
CO2 SERPL-SCNC: 28 MMOL/L (ref 20–33)
CREAT SERPL-MCNC: 0.79 MG/DL (ref 0.5–1.4)
GLOBULIN SER CALC-MCNC: 2.5 G/DL (ref 1.9–3.5)
GLUCOSE SERPL-MCNC: 116 MG/DL (ref 65–99)
POTASSIUM SERPL-SCNC: 4.4 MMOL/L (ref 3.6–5.5)
PROT SERPL-MCNC: 6.5 G/DL (ref 6–8.2)
SODIUM SERPL-SCNC: 141 MMOL/L (ref 135–145)

## 2020-12-08 PROCEDURE — 36415 COLL VENOUS BLD VENIPUNCTURE: CPT

## 2020-12-08 PROCEDURE — 80053 COMPREHEN METABOLIC PANEL: CPT

## 2020-12-11 NOTE — PREPROCEDURE INSTRUCTIONS
PAT call made 12/11/2020 at 1408, spoke with pt after confirming 2 pt identifiers. Health hx, medications, allergies  reviewed with pt, as well as pre-procedure/sedation instructions. Respiratory screen completed. Pt denies being sick/symptomatic (fever, cough, SOB, diarrhea) w/in last 72 hrs, or having close contact w/ sick individuals in the past 2 wks. Pt education to notify her MD should she become symptomatic prior to procedure. Pt verbalizes understanding. Pt told to bring a form fitting mask and the she will be wearing it entire stay. Informed pt it is recommended pt self isolate for 72 hrs or from time of this call until procedure, also that we request the  to remain on site until pt is discharged. Pt verbalizes understanding.

## 2020-12-14 ENCOUNTER — OFFICE VISIT (OUTPATIENT)
Dept: RHEUMATOLOGY | Facility: MEDICAL CENTER | Age: 85
End: 2020-12-14
Payer: MEDICARE

## 2020-12-14 VITALS
WEIGHT: 184 LBS | OXYGEN SATURATION: 94 % | TEMPERATURE: 96.7 F | HEART RATE: 86 BPM | RESPIRATION RATE: 14 BRPM | DIASTOLIC BLOOD PRESSURE: 78 MMHG | SYSTOLIC BLOOD PRESSURE: 130 MMHG | BODY MASS INDEX: 31.58 KG/M2

## 2020-12-14 DIAGNOSIS — D68.59 PROTEIN S DEFICIENCY (HCC): ICD-10-CM

## 2020-12-14 DIAGNOSIS — H35.30 MACULAR DEGENERATION OF BOTH EYES, UNSPECIFIED TYPE: ICD-10-CM

## 2020-12-14 DIAGNOSIS — Z79.899 LONG-TERM USE OF PLAQUENIL: ICD-10-CM

## 2020-12-14 DIAGNOSIS — Z51.81 ENCOUNTER FOR MONITORING DENOSUMAB THERAPY: ICD-10-CM

## 2020-12-14 DIAGNOSIS — M81.0 OSTEOPOROSIS WITHOUT CURRENT PATHOLOGICAL FRACTURE, UNSPECIFIED OSTEOPOROSIS TYPE: ICD-10-CM

## 2020-12-14 DIAGNOSIS — Z79.899 ENCOUNTER FOR MONITORING DENOSUMAB THERAPY: ICD-10-CM

## 2020-12-14 DIAGNOSIS — Z79.01 CHRONIC ANTICOAGULATION: ICD-10-CM

## 2020-12-14 DIAGNOSIS — M15.9 PRIMARY OSTEOARTHRITIS INVOLVING MULTIPLE JOINTS: ICD-10-CM

## 2020-12-14 DIAGNOSIS — M65.4 DE QUERVAIN'S DISEASE (RADIAL STYLOID TENOSYNOVITIS): ICD-10-CM

## 2020-12-14 DIAGNOSIS — C18.9 ADENOCARCINOMA OF COLON (HCC): ICD-10-CM

## 2020-12-14 DIAGNOSIS — I10 ESSENTIAL HYPERTENSION, BENIGN: ICD-10-CM

## 2020-12-14 PROCEDURE — 99214 OFFICE O/P EST MOD 30 MIN: CPT | Mod: 25 | Performed by: INTERNAL MEDICINE

## 2020-12-14 PROCEDURE — 20550 NJX 1 TENDON SHEATH/LIGAMENT: CPT | Mod: LT | Performed by: INTERNAL MEDICINE

## 2020-12-14 RX ORDER — HYDROXYCHLOROQUINE SULFATE 200 MG/1
TABLET, FILM COATED ORAL
Qty: 180 TAB | Refills: 0 | Status: SHIPPED | OUTPATIENT
Start: 2020-12-14 | End: 2021-04-01

## 2020-12-14 RX ORDER — METHYLPREDNISOLONE ACETATE 40 MG/ML
10 INJECTION, SUSPENSION INTRA-ARTICULAR; INTRALESIONAL; INTRAMUSCULAR; SOFT TISSUE ONCE
Status: COMPLETED | OUTPATIENT
Start: 2020-12-14 | End: 2020-12-14

## 2020-12-14 RX ADMIN — METHYLPREDNISOLONE ACETATE 10 MG: 40 INJECTION, SUSPENSION INTRA-ARTICULAR; INTRALESIONAL; INTRAMUSCULAR; SOFT TISSUE at 12:02

## 2020-12-14 ASSESSMENT — FIBROSIS 4 INDEX: FIB4 SCORE: 2.11

## 2020-12-14 NOTE — PROGRESS NOTES
Chief Complaint- joint pain     Subjective:   Marry Mathur is a 85 y.o. female here today for follow up of rheumatological issues    This is a follow-up visit for this patient who is seen in this clinic for osteoarthritis.  There is remote history of rheumatoid arthritis in the past with negative serologies and negative x-rays.  Patient currently on Plaquenil at 200 mg p.o. twice daily with benefit.  Patient not able to take NSAIDs because of chronic anticoagulation secondary to protein S deficiency.  Patient states overall she is feeling well although she does have a little bit of pain in the area of the left de Quervain's Sera synovitis area.  Patient asking for corticosteroid injection today.   Patient denies any side effects from the medication, denies any unexplained weight loss, denies any fevers of unknown etiology, denies any GI upset, denies any rashes, denies any new joint swelling, denies recurrent infections.  Of note patient's last ophthalmology evaluation October 2020 with Dr. Savage     Patient also has low back issues scheduled for facet joint injections through Dr. Cooper at [facility]     Other issues include the development of leg length discrepancy status post left CARLYLE with left leg longer than the right.  Patient just recently got shoes to help adjust her leg length.      Other issues include a finding of osteoporosis on patient's bone density scan from March 2018, patient was not able to tolerate Fosamax because of a history of Araya's esophagus, patient currently on Prolia 60 mg subcu every 6 months with benefit.        Additional comorbidities include diabetes for which patient  takes metformin, also with a diagnosis of spinal stenosis with intermittent weakness in her legs with progressive numbness and weakness in the left leg.  Patient also with protein S deficiency with a history of DVT on chronic anticoagulation.      S/p NSAIDS-unable to take because of  chronic anticoagulation     Left TKA   Left CARLYLE      S/p gabapentin -ineffective   S/p Remicade-stopped because of hx of melanoma about 1996 and has multiple other skin cancers  S/p Orencia-lost efficacy  S/p Humira-stopped because of recurrence of melanoma October 2017  S/p MTX-stopped because of development of skin cancer/melanoma October 2017  S/p NSAIDS-relatively contraindicated as patient is on chronic anticoagulation   S/p xeljanz-stopped because of the development of colonic adenocarcinoma  S/p fosamax-unable to tolerate-GI upset      G6PD 13.6 adequate 5/2019  DEXA 3/18/2016 T scores 1.1, -1.1   FRAX 3/18/2016 major osteoporotic fracture risk 14.3%, hip fracture risks 3.7%  DEXA 3/23/2018 T scores 0.2, -1.4  FRAX 3/23/2018 major osteoporotic fracture risk 19.3%, hip fracture risk 6.1%  DEXA 6/2/2020 T scores 0.7, -1.4  FRAX 6/2/2020 major osteoporotic fracture risk 17.2%, hip fracture risk 4.9%  Prolia started 6/2019  Echocardiogram 7/2012 [facility]  RF neg 3/2014 [facility]; RF neg 6/2014 [facility]; RF neg 6/2016  CCP neg 3/2014 [facility]; CCP neg 6/2014 [facility]; CCP neg 6/2016  Uric acid 4.7 3/2014 [facility];Uric acid 4.5 6/2016  Hep B neg 6/2016  Quantiferon Gold neg 6/2014 [facility]; Quantiferon Gold neg 6/2016  Hand x-rays 3/2016-indicate osteoarthritis  Feet x-rays 3/2016-indicate osteoarthritis     Corticosteroid Therapy Informed Consent signed 1/17/2019-copy given to patient            Current medicines (including changes today)  Current Outpatient Medications   Medication Sig Dispense Refill   • hydroxychloroquine (PLAQUENIL) 200 MG Tab TAKE ONE BY MOUTH TWICE DAILY 180 Tab 0   • Potassium 99 MG Tab Take 99 mg by mouth every day.     • terbinafine (LAMISIL) 250 MG Tab Take 1 Tab by mouth every day. 90 Tab 0   • lidocaine (LIDODERM) 5 % Patch Apply 1 Patch to skin as directed every 24 hours. 90 Patch 0   • rivaroxaban (XARELTO) 20 MG Tab tablet Take 1 Tab by mouth with dinner. 90  Tab 1   • atorvastatin (LIPITOR) 10 MG Tab Take 1 Tab by mouth every evening. 90 Tab 3   • LYRICA 100 MG Cap Take 100 mg by mouth 2 times a day.     • omeprazole (PRILOSEC) 20 MG delayed-release capsule Take 20 mg by mouth every day.     • Diphenhydramine-APAP, sleep, (TYLENOL PM EXTRA STRENGTH)  MG Tab Take 2 Tabs by mouth every bedtime.     • acetaminophen (TYLENOL) 500 MG Tab Take 1,000 mg by mouth 2 Times a Day.     • Melatonin 10 MG Tab Take 1 Tab by mouth every bedtime.     • vitamin D (CHOLECALCIFEROL) 1000 UNIT Tab Take 1,000 Units by mouth every morning.     • Calcium Carbonate-Vitamin D (CALCIUM + D PO) Take 1 Tab by mouth every bedtime.       Current Facility-Administered Medications   Medication Dose Route Frequency Provider Last Rate Last Admin   • denosumab (PROLIA) subq injection 60 mg  60 mg Subcutaneous Q6 MO Deanna Escoto M.D.   60 mg at 12/14/20 1139     She  has a past medical history of Adenocarcinoma of colon ([facility]) (10/30/2018), Anesthesia, Atrial fibrillation [I48.91] (4/19/2016), Back pain (6/27/2012), Blood clotting disorder ([facility]) (2012), Bowel habit changes, Cancer ([facility]), Cataract, Chronic back pain greater than 3 months duration, Deep vein thrombosis ([facility]) (3/8/2016), Essential hypertension, benign (6/27/2012), GERD (gastroesophageal reflux disease) (6/27/2012), Heart murmur, Hyperlipidemia, Hypertension, Impaired fasting glucose (11/2/2017), Mild aortic stenosis (11/2/2017), Other and unspecified hyperlipidemia (6/27/2012), Prediabetes, Protein S deficiency ([facility]) (11/2/2017), Rheumatoid arthritis involving multiple sites with positive rheumatoid factor ([facility]) (03/14/2016), Rheumatoid nodule ([facility]) (7/25/2017), Right bundle branch block (6/27/2012), and Urinary incontinence (11/2/2017).    ROS   Other than what is mentioned in HPI or physical exam, there is no history of headaches, double vision or blurred vision. No temporal tenderness or jaw claudication. No trouble swallowing  difficulties or sore throats.  No chest complaints including chest pain, cough or sputum production. No GI complaints including nausea, vomiting, change in bowel habits, or past peptic ulcer disease. No history of blood in the stools. No urinary complaints including dysuria or frequency. No history of alopecia, photosensitivity, oral ulcerations, Raynaud's phenomena.       Objective:     /78   Pulse 86   Temp 35.9 °C (96.7 °F) (Temporal)   Resp 14   Wt 83.5 kg (184 lb)   SpO2 94%  Body mass index is 31.58 kg/m².   Physical Exam:    Constitutional: Alert and oriented X3, patient is talkative with good eye contact.Skin: Warm, dry, good turgor, no rashes in visible areas.Eye: Equal, round and reactive, conjunctiva clear, lids normal EOM intactENMT: Lips without lesions, good dentition, no oropharyngeal ulcers, moist buccal mucosa, pinna without deformityNeck: Trachea midline, no masses, no thyromegaly.Lymph:  No cervical lymphadenopathy, no axillary lymphadenopathy, no supraclavicular lymphadenopathyRespiratory: Unlabored respiratory effort, lungs clear to auscultation, no wheezes, no ronchi.Cardiovascular: Normal S1, S2, no murmur, no edema.Abdomen: Soft, non-tender, no masses, no hepatosplenomegaly.Psych: Alert and oriented x3, normal affect and mood.Neuro: Cranial nerves 2-12 are grossly intact, no loss of sensation LEExt:no joint laxity noted in bilateral arms, no joint laxity noted in bilateral legs positive tenderness in the area of the left radial styloid, tenderness palpation, patient able to move her left thumb without any restriction, evidence of Heberden's nodes on most DIP joints of both hands no flexion contractures, shoulders good range of motion    Lab Results   Component Value Date/Time    QNTTBGOLD Negative 06/29/2016 02:03 PM     Lab Results   Component Value Date/Time    HEPBCORIGM Negative 06/29/2016 02:03 PM    HEPBSAG Negative 06/29/2016 02:03 PM     Lab Results   Component Value  Date/Time    SODIUM 141 12/08/2020 07:44 AM    POTASSIUM 4.4 12/08/2020 07:44 AM    CHLORIDE 106 12/08/2020 07:44 AM    CO2 28 12/08/2020 07:44 AM    GLUCOSE 116 (H) 12/08/2020 07:44 AM    BUN 15 12/08/2020 07:44 AM    CREATININE 0.79 12/08/2020 07:44 AM      Lab Results   Component Value Date/Time    WBC 5.6 10/14/2020 07:09 AM    RBC 4.79 10/14/2020 07:09 AM    HEMOGLOBIN 14.9 10/14/2020 07:09 AM    HEMATOCRIT 45.9 10/14/2020 07:09 AM    MCV 95.8 10/14/2020 07:09 AM    MCH 31.1 10/14/2020 07:09 AM    MCHC 32.5 (L) 10/14/2020 07:09 AM    MPV 12.4 10/14/2020 07:09 AM    NEUTSPOLYS 58.30 10/14/2020 07:09 AM    LYMPHOCYTES 29.30 10/14/2020 07:09 AM    MONOCYTES 8.40 10/14/2020 07:09 AM    EOSINOPHILS 3.10 10/14/2020 07:09 AM    BASOPHILS 0.70 10/14/2020 07:09 AM      Lab Results   Component Value Date/Time    CALCIUM 8.8 12/08/2020 07:44 AM    ASTSGOT 16 12/08/2020 07:44 AM    ALTSGPT 13 12/08/2020 07:44 AM    ALKPHOSPHAT 54 12/08/2020 07:44 AM    TBILIRUBIN 0.5 12/08/2020 07:44 AM    ALBUMIN 4.0 12/08/2020 07:44 AM    TOTPROTEIN 6.5 12/08/2020 07:44 AM     Lab Results   Component Value Date/Time    URICACID 4.5 06/29/2016 02:03 PM    RHEUMFACTN <10 06/29/2016 02:03 PM    CCPANTIBODY 4 06/29/2016 02:03 PM     Lab Results   Component Value Date/Time    SEDRATEWES 11 07/07/2018 11:25 AM     Lab Results   Component Value Date/Time    HBA1C 5.5 05/16/2016 07:50 AM     Lab Results   Component Value Date/Time    G6PD 13.6 05/20/2019 10:01 AM     Lab Results   Component Value Date/Time    CPKTOTAL 61 06/29/2016 02:03 PM     Results for orders placed during the hospital encounter of 03/18/16   DX-JOINT SURVEY-HANDS SINGLE VIEW    Impression Multiple bilateral sites of osteoarthritis     Results for orders placed during the hospital encounter of 03/18/16   DX-JOINT SURVEY-FEET SINGLE VIEW    Impression 1.  Bilateral midfoot and forefoot osteoarthritis    2.  Bilateral bunion    3.  Prior fusion across the right 2nd proximal  interphalangeal joint    4.  Foreign body or opaque material between 1st and 2nd phalanges     Results for orders placed during the hospital encounter of 06/02/20   DS-BONE DENSITY STUDY (DEXA)    Impression According to the World Health Organization classification, bone mineral density of this patient is osteopenic in the right femur, within normal limits of the lumbar spine and there has been no significant interval change.    10-year Probability of Fracture:  Major Osteoporotic     17.2%  Hip     4.9%  Population      [place] ()    Based on right femur neck BMD          INTERPRETING LOCATION:  [address] [place] [address], [place], [id number]     Results for orders placed during the hospital encounter of 07/24/20   DX-KNEE COMPLETE 4+ RIGHT    Impression 1.  No acute fracture.    2.  Subcutaneous soft tissue edema superficial to the patellar tendon likely represents contusion/hematoma.     Results for orders placed during the hospital encounter of 01/14/09   DX-SHOULDER 2+    Impression IMPRESSION:     NORMAL RADIOGRAPHS OF THE LEFT SHOULDER WITH NOTE MADE OF MINIMAL   DEGENERATIVE OSTEOARTHROSIS OF THE GLENOHUMERAL JOINT WITH MINIMAL   SPURRING.           Results for orders placed during the hospital encounter of 05/26/20   MR-LUMBAR SPINE-W/O    Impression Interval worsening L3/4 central protrusion results in worsening moderate central stenosis    Otherwise stable multilevel degenerative change resulting in foraminal predominate stenoses, greatest is moderate to severe on the right at L3/4.    Lesser stenoses at other levels as detailed above    Stable L4/5 grade 1 anterolisthesis of secondary to severe facet arthropathy. Stable minimal degenerative retrolisthesis of the upper lumbar levels    Mature L5/S1 interbody fusion        Results for orders placed during the hospital encounter of 10/08/18   MR-KNEE-W/O LEFT    Impression Status post medial knee hemiarthroplasty without complication identified    Mild  patellofemoral and lateral femorotibial osteoarthritis with some cartilage thinning and spurring but no full-thickness defects or areas of marrow edema are identified     Results for orders placed during the hospital encounter of 01/14/09   DX-CERVICAL SPINE-2 OR 3 VIEWS    Impression IMPRESSION:     DEGENERATIVE DISC AND FACET ARTHROPATHY C5-6, C6-7, AND DEGENERATIVE   FACET ARTHROPATHY AT C7-T1.           Assessment and Plan:     1. Primary osteoarthritis involving multiple joints  Overall stable with Plaquenil at 200 mg p.o. twice daily, will continue as such    2. Long-term use of Plaquenil  On Plaquenil 200 mg p.o. twice daily, of note G6PD levels are adequate  Patient needs ophthalmology evaluation every year, last ophthalmology evaluation October 2020 with Dr. Savage, next ophthalmology evaluation due October 2021  Patient needs monitoring labs every 6 months, next labs due about April 2021  - hydroxychloroquine (PLAQUENIL) 200 MG Tab; TAKE ONE BY MOUTH TWICE DAILY  Dispense: 180 Tab; Refill: 0    3. De Quervain's disease (radial styloid tenosynovitis)  As seen on physical exam today, today we will do a left radial styloid tenosynovitis corticosteroid injection  - methylPREDNISolone acetate (DEPO-MEDROL) injection 10 mg    4. Osteoporosis without current pathological fracture, unspecified osteoporosis type  Last DEXA June 2020 Next DEXA June 2022 currently on Prolia 60 mg subcu every 6 months  Patient due for Prolia shot today   continue calcium citrate 1200 mg by mouth daily and vitamin D about 2000 units by mouth daily and magnesium 200 mg by mouth daily    5. Encounter for monitoring denosumab therapy  On Prolia 60 mg subcu every 6 months  Patient needs CMP within 2 weeks of each Prolia shot to monitor calcium and renal functions    6. Adenocarcinoma of colon (HCC)  Status post resection currently stable  - hydroxychloroquine (PLAQUENIL) 200 MG Tab; TAKE ONE BY MOUTH TWICE DAILY  Dispense: 180 Tab; Refill:  0    7. Chronic anticoagulation  This will impact with kind of medications we can use for this patient's arthritis, NSAIDs are contraindicated because of increased risk of bleeding while on chronic anticoagulation    8. Protein S deficiency (HCC)  Chronic anticoagulation  This will impact with kind of medications we can use for this patient's arthritis, NSAIDs are contraindicated because of increased risk of bleeding while on chronic anticoagulation    9. Essential hypertension, benign  May impact the type of medications we can use for this patient's arthritis. We will have to keep this under advisement.    10. Macular degeneration of both eyes, unspecified type  Currently followed by Dr. Savage in ophthalmology    Procedure: Left radial styloid tenosynovitis corticosteroid injection    The procedure was explained to the patient in detail including potential damage to area being injected including risk of avascular necrosis, all questions were answered, verbal consent to do procedure was obtained. Risks and benefits were reviewed with patient and patient states understanding including risks of steroid injections including bleeding, infections, subcutaneous lipolysis and/or hypopigmentation at site of injection, and risk of avascular necrosis. Verbal consent was again obtained.  Patient denies any allergies to lidocaine, Betadine/iodine or corticosteroids.  The patient was positioned appropriately. Anatomical landmarks were identified.    Area of left radial styloid was prepped with betadine x 3, local anesthetic with ethyl chloride and 1% Xylocaine lot #[id number], Exp September 2023 and using sterile technique,  injected 10 mg of Depomedrol Lot #[id number], Exp September 2021 using lateral approach left radial styloid area    EBL 0  The patient tolerated the procedure well, was observed in the office and there were no complications     Patient was asked to rest left wrist for 3 days, patient states understanding and  states will comply.        Followup: Return in about 6 months (around 6/14/2021). or sooner jose eduardo Fernandesconcha Mathur  was seen 30 minutes face-to-face of which more than 50% of the time was spent counseling the patient (excluding time for procedures)  regarding  rheumatological condition and care. Therapy was discussed in detail.      Please note that this dictation was created using voice recognition software. I have made every reasonable attempt to correct obvious errors, but I expect that there are errors of grammar and possibly content that I did not discover before finalizing the note.

## 2020-12-14 NOTE — PROGRESS NOTES
"Subjective:   Date of Consultation:12/14/2020 11:17 AM  Primary care physician:CRISTINO Horner  Radiation oncology:{[facility] RAD ONC PROVIDER:[id number]}  Surgery:{[facility] SURGERY ONC PROVIDER:[id number]}    Reason for Consultation:  Ms. Mathur  is a pleasant 85 y.o. year old female who presented with ***    History of presenting illness:  She is here for a ***    Treatment History:***    Past Medical History:   Diagnosis Date   • Adenocarcinoma of colon (HCC) 10/30/2018    From sessile serrated polyp 10/2018   • Anesthesia     pt states \"one new dr scraped my esophagus when they put the tube in and I started bleeding so they couldn't operate\"    • Atrial fibrillation [I48.91] 4/19/2016     pt denies    • Back pain 6/27/2012   • Blood clotting disorder (HCC) 2012    clot in leg   • Bowel habit changes     constipation    • Cancer (HCC)     skin, colon 2018   • Cataract     belia IOL    • Chronic back pain greater than 3 months duration    • Deep vein thrombosis (HCC) 3/8/2016    First occurrence in [facility] in late 1970s Second occurrence further up in [facility] in 2012, has been on AC since    • Essential hypertension, benign 6/27/2012   • GERD (gastroesophageal reflux disease) 6/27/2012   • Heart murmur    • Hyperlipidemia    • Hypertension     hx of, not currently    • Impaired fasting glucose 11/2/2017   • Mild aortic stenosis 11/2/2017   • Other and unspecified hyperlipidemia 6/27/2012   • Prediabetes    • Protein S deficiency (HCC) 11/2/2017    Noted in lab work 2013 as a part of work up at [facility]    • Rheumatoid arthritis involving multiple sites with positive rheumatoid factor (HCC) 03/14/2016    Dr. Escoto   • Rheumatoid nodule (HCC) 7/25/2017   • Right bundle branch block 6/27/2012    pt denies    • Urinary incontinence 11/2/2017     Past Surgical History:   Procedure Laterality Date   • IRRIGATION & DEBRIDEMENT ORTHO Bilateral 7/31/2020    Procedure: IRRIGATION AND DEBRIDEMENT, WOUND - KNEE;  Surgeon: " Roosevelt Saucedo M.D.;  Location: SURGERY [facility];  Service: Orthopedics   • HEMICOLECTOMY Right 12/13/2018    Procedure: OPEN RIGHT HEMICOLECTOMY;  Surgeon: Dash Bhagat M.D.;  Location: SURGERY [facility];  Service: General   • INGUINAL HERNIA REPAIR Right 9/23/2016    Procedure: INGUINAL HERNIA REPAIR - PRIMARY;  Surgeon: Mary Medina M.D.;  Location: SURGERY [facility];  Service:    • OTHER  08/12/2014    peroneal nerve surgery Dr. Castro    • OTHER ORTHOPEDIC SURGERY  2014    left knee partial   • OTHER ORTHOPEDIC SURGERY  2011    meniscus repair   • ARTHROPLASTY      partial TKA with Dr. Persaud   • CHOLECYSTECTOMY     • HYSTERECTOMY, TOTAL ABDOMINAL  1970's   • ROTATOR CUFF REPAIR Bilateral      Allergies   Allergen Reactions   • Sulfa Drugs Vomiting     RXN=young person     Outpatient Encounter Medications as of 12/14/2020   Medication Sig Dispense Refill   • hydroxychloroquine (PLAQUENIL) 200 MG Tab TAKE ONE BY MOUTH TWICE DAILY 180 Tab 0   • Potassium 99 MG Tab Take 99 mg by mouth every day.     • terbinafine (LAMISIL) 250 MG Tab Take 1 Tab by mouth every day. 90 Tab 0   • lidocaine (LIDODERM) 5 % Patch Apply 1 Patch to skin as directed every 24 hours. 90 Patch 0   • rivaroxaban (XARELTO) 20 MG Tab tablet Take 1 Tab by mouth with dinner. 90 Tab 1   • atorvastatin (LIPITOR) 10 MG Tab Take 1 Tab by mouth every evening. 90 Tab 3   • LYRICA 100 MG Cap Take 100 mg by mouth 2 times a day.     • omeprazole (PRILOSEC) 20 MG delayed-release capsule Take 20 mg by mouth every day.     • Diphenhydramine-APAP, sleep, (TYLENOL PM EXTRA STRENGTH)  MG Tab Take 2 Tabs by mouth every bedtime.     • acetaminophen (TYLENOL) 500 MG Tab Take 1,000 mg by mouth 2 Times a Day.     • Melatonin 10 MG Tab Take 1 Tab by mouth every bedtime.     • vitamin D (CHOLECALCIFEROL) 1000 UNIT Tab Take 1,000 Units by mouth every morning.     • Calcium Carbonate-Vitamin D (CALCIUM + D PO) Take 1 Tab by mouth  every bedtime.     • [DISCONTINUED] hydroxychloroquine (PLAQUENIL) 200 MG Tab TAKE ONE BY MOUTH TWICE DAILY 180 Tab 0     Facility-Administered Encounter Medications as of 12/14/2020   Medication Dose Route Frequency Provider Last Rate Last Admin   • methylPREDNISolone acetate (DEPO-MEDROL) injection 10 mg  10 mg Other Once Deanna Escoto M.D.       • denosumab (PROLIA) subq injection 60 mg  60 mg Subcutaneous Q6 MO Deanna Escoto M.D.   60 mg at 12/14/20 1139     @[facility]@  Social History     Socioeconomic History   • Marital status:      Spouse name: Not on file   • Number of children: Not on file   • Years of education: Not on file   • Highest education level: Not on file   Occupational History   • Not on file   Social Needs   • Financial resource strain: Not on file   • Food insecurity     Worry: Not on file     Inability: Not on file   • Transportation needs     Medical: Not on file     Non-medical: Not on file   Tobacco Use   • Smoking status: Never Smoker   • Smokeless tobacco: Never Used   Substance and Sexual Activity   • Alcohol use: Not Currently     Alcohol/week: 0.0 oz     Comment: occ   • Drug use: No   • Sexual activity: Not Currently     Partners: Male     Comment:     Lifestyle   • Physical activity     Days per week: Not on file     Minutes per session: Not on file   • Stress: Not on file   Relationships   • Social connections     Talks on phone: Not on file     Gets together: Not on file     Attends [employer] service: Not on file     Active member of club or organization: Not on file     Attends meetings of clubs or organizations: Not on file     Relationship status: Not on file   • Intimate partner violence     Fear of current or ex partner: Not on file     Emotionally abused: Not on file     Physically abused: Not on file     Forced sexual activity: Not on file   Other Topics Concern   • Not on file   Social History Narrative    Retired from [employer].  [employer] program       Social History     Tobacco Use   Smoking Status Never Smoker   Smokeless Tobacco Never Used     Social History     Substance and Sexual Activity   Alcohol Use Not Currently   • Alcohol/week: 0.0 oz    Comment: occ     Social History     Substance and Sexual Activity   Drug Use No      OB History    Para Term  AB Living   1 1 1         SAB TAB Ectopic Molar Multiple Live Births                    # Outcome Date GA Lbr Freddy/2nd Weight Sex Delivery Anes PTL Lv   1 Term               No LMP recorded. Patient has had a hysterectomy.    G P A L     Family History   Problem Relation Age of Onset   • Cancer Mother         stomach   • Cancer Father         colon   • Leukemia Father         many exposure, worked for [employer]    • Heart Disease Brother         s/p stent        HPI    ROS     Objective:   /78   Pulse 86   Temp 35.9 °C (96.7 °F) (Temporal)   Resp 14   Wt 83.5 kg (184 lb)   SpO2 94%   BMI 31.58 kg/m²     Physical Exam    Assessment:     1. Primary osteoarthritis involving multiple joints     2. Long-term use of Plaquenil  hydroxychloroquine (PLAQUENIL) 200 MG Tab   3. De Quervain's disease (radial styloid tenosynovitis)  methylPREDNISolone acetate (DEPO-MEDROL) injection 10 mg   4. Osteoporosis without current pathological fracture, unspecified osteoporosis type     5. Encounter for monitoring denosumab therapy     6. Adenocarcinoma of colon (HCC)  hydroxychloroquine (PLAQUENIL) 200 MG Tab   7. Chronic anticoagulation     8. Protein S deficiency (HCC)     9. Essential hypertension, benign     10. Macular degeneration of both eyes, unspecified type     11. Rheumatoid arthritis involving multiple sites with positive rheumatoid factor (HCC)  hydroxychloroquine (PLAQUENIL) 200 MG Tab   12. Hip pain, left  hydroxychloroquine (PLAQUENIL) 200 MG Tab     Labs:  {*** HELP TEXT ***    This SmartLink requires parameters for processing. Parameters allow for more  information to be given to the SmartLink. This allows you to request specific information by giving the SmartLink precise direction.    The SmartLink accepts a list of result component base names  by commas. You can also request the number of results to display for each component. To indicate the number of results for each component, type the component name followed by a colon and then the number of results (Name:4). For all results for that particular component, type a star in place of the number (Name:*). To obtain the first and last results for a particular component, type FL in place of the number or the star (Name:FL). To obtain the last result for a particular component, type the component name without a colon, number, or star.    Example: .[id number][MCH:3,MCV:*,HCT    Your results may be limited by:    Lookback limit - results older than 7200 hours will be excluded.  }   Imaging:  ***  Pathology:  ***    Medical Decision Making:  Today's Assessment / Status / Plan:     ***  I have spent greater than 50% of this *** minute visit in counseling/coordination of care with the patient regarding ***.    She agreed and verbalized her agreement and understanding with the current plan. I answered all questions and concerns she has at this time and advised her to call at any time in there interim with questions or concerns in regards to her care.    Thank you for allowing me to participate in her care, I will continue to follow closely.

## 2020-12-14 NOTE — PROGRESS NOTES
Marry Mathur is a 85 y.o. female here for a provider visit for Prolia 60mg injection.    Reason for injection: osteoporosis  Order in MAR?: Yes  Patient supplied?:No  Minimum interval has been met for this injection (per MAR order): Yes    Order and dose verified by: dr garcia  Patient tolerated injection and no adverse effects were observed or reported: Yes    # of Administrations remaining in MAR: 2

## 2020-12-15 ENCOUNTER — APPOINTMENT (OUTPATIENT)
Dept: RADIOLOGY | Facility: REHABILITATION | Age: 85
End: 2020-12-15
Attending: PHYSICAL MEDICINE & REHABILITATION
Payer: MEDICARE

## 2020-12-15 ENCOUNTER — HOSPITAL ENCOUNTER (OUTPATIENT)
Facility: REHABILITATION | Age: 85
End: 2020-12-15
Attending: PHYSICAL MEDICINE & REHABILITATION | Admitting: PHYSICAL MEDICINE & REHABILITATION
Payer: MEDICARE

## 2020-12-15 VITALS
HEART RATE: 70 BPM | RESPIRATION RATE: 16 BRPM | TEMPERATURE: 97.6 F | WEIGHT: 182.98 LBS | DIASTOLIC BLOOD PRESSURE: 77 MMHG | BODY MASS INDEX: 32.42 KG/M2 | HEIGHT: 63 IN | SYSTOLIC BLOOD PRESSURE: 164 MMHG | OXYGEN SATURATION: 96 %

## 2020-12-15 PROCEDURE — 700117 HCHG RX CONTRAST REV CODE 255

## 2020-12-15 PROCEDURE — 700101 HCHG RX REV CODE 250

## 2020-12-15 PROCEDURE — 700111 HCHG RX REV CODE 636 W/ 250 OVERRIDE (IP)

## 2020-12-15 PROCEDURE — 64494 INJ PARAVERT F JNT L/S 2 LEV: CPT

## 2020-12-15 PROCEDURE — 64493 INJ PARAVERT F JNT L/S 1 LEV: CPT

## 2020-12-15 RX ORDER — LIDOCAINE HYDROCHLORIDE 20 MG/ML
INJECTION, SOLUTION EPIDURAL; INFILTRATION; INTRACAUDAL; PERINEURAL
Status: COMPLETED
Start: 2020-12-15 | End: 2020-12-15

## 2020-12-15 RX ORDER — TRIAMCINOLONE ACETONIDE 40 MG/ML
INJECTION, SUSPENSION INTRA-ARTICULAR; INTRAMUSCULAR
Status: COMPLETED
Start: 2020-12-15 | End: 2020-12-15

## 2020-12-15 RX ADMIN — TRIAMCINOLONE ACETONIDE 80 MG: 40 INJECTION, SUSPENSION INTRA-ARTICULAR; INTRAMUSCULAR at 13:41

## 2020-12-15 RX ADMIN — IOHEXOL 2 ML: 240 INJECTION, SOLUTION INTRATHECAL; INTRAVASCULAR; INTRAVENOUS; ORAL at 13:41

## 2020-12-15 RX ADMIN — LIDOCAINE HYDROCHLORIDE 5 ML: 20 INJECTION, SOLUTION EPIDURAL; INFILTRATION; INTRACAUDAL; PERINEURAL at 13:38

## 2020-12-15 ASSESSMENT — FIBROSIS 4 INDEX: FIB4 SCORE: 2.11

## 2020-12-15 ASSESSMENT — PAIN DESCRIPTION - PAIN TYPE: TYPE: CHRONIC PAIN

## 2020-12-15 NOTE — NON-PROVIDER
Pre-procedure assessment completed  and pertinent health information (HTN, xarelto use,RA) communicated to physician and staff during timeout. Pt positionned pre-procedure on table by RN, CST and xray tech. Pillow placed under feet for support.

## 2020-12-15 NOTE — NON-PROVIDER
Escorted  by RN  from procedure room ambulatory  .Fluids and food tolerated well. Ice compress applied to procedure site. Reviewed  discharged home care  instruction given and understood by patient. Post procedure evaluated  by Dr. Cooper . Discharged ambulatory  without difficulty or any deficits with  designated .

## 2020-12-15 NOTE — OR SURGEON
Immediate Post OP Note    PreOp Diagnosis: lumbar spondylosis/ low back pain    PostOp Diagnosis: lumbar spindylosis/ low back pain    Procedure(s):  BLOCK, NERVE, SPINAL, LUMBAR, POSTERIOR RAMUS, MEDIAL BRANCH - Wound Class: Clean    Surgeon(s):  Chris Cooper M.D.    Anesthesiologist/Type of Anesthesia:  No anesthesia staff entered./Local    Surgical Staff:  Circulator: Carolyn Polk R.N.  Scrub Person: Kezia Harry  Radiology Technologist: Martin Gross    Specimens removed if any:  * No specimens in log *    Estimated Blood Loss: 0    Findings: 0    Complications: 0        12/15/2020 2:01 PM Chris Cooper M.D.

## 2020-12-15 NOTE — DISCHARGE INSTR - OTHER INFO
HOME CARE INSTRUCTIONS    1. Resume usual diet and medication unless otherwise instructed by your physician.  2. You may experience tenderness in your low back/neck after the procedure for the next 24 hours.  The pain you have may worsen in the next day or so.  It can take up to one week to get relief from the injection.  For postprocedural pain, over the counter non-Asprin analgesic in recommended.  3. Apply ice pack to the injection site intermittently for the next 24 hours (apply for 15 minutes, remove for 30 minutes, reapply ice, etc.).  4. Do not apply heat to this area for the next 24 hours.  5. Resume non-strenuous activity as prior to the injection unless otherwise instructed by your physician.  6. If given local anesthesia with your procedure, you may experience warmth, tingling and/or numbness in your extremities following the injection.  It is important that you take extra time and precautions to prevent possible stumbling or falling.  7. Avoid immersing in standing water like tub bath, hot bath or swimming pool for days.  Showers are okay beginning the day after your procedure.  8. If you received sedation for your procedure, there are a few instructions we recommend for your safety:  a. Rest and take it easy for the first 24 hours.  b. A responsible adult is recommended to remain with you during this time.  c. It is normal to feel sleepy.  d. Do not do anything that requires balance, judgment or coordination.  e. Mild flu-like symptoms are normal; such as generalized muscle aches, headache and/or mild nausea.  f. For 24 hours, do not:  i. Drive, operate machinery or run household appliances.  ii. Drink beer or alcoholic beverages or use recreational drugs.  iii. Make important decisions or sign legal documents.     9. If you feel you are experiencing severe symptoms or having a medical emergency, call 911.    10. Your physician will discuss follow-up arrangements with you.  If you have any questions or  problems, please call your physician.

## 2020-12-16 NOTE — PROCEDURES
DATE OF SERVICE:  12/15/2020     NAME OF PROCEDURES:  1.  Left L4-L5 and L5-S1 facet joint injections with 20 mg of Kenalog under   fluoroscopic guidance.  2.  Right L4-L5 and L5-S1 facet joint injections with 20 mg of Kenalog under   fluoroscopic guidance.  3.  Permanent images have been recorded.     INDICATION FOR PROCEDURE:  The patient is a patient of mine with low back   pain, felt to be facet-mediated.  For this reason, facet joint injections were   chosen for today's procedure.     DESCRIPTION OF PROCEDURE:  After appropriate informed consent was obtained,   the patient was placed prone on the table.  Her skin was thoroughly cleansed   with ChloraPrep swab then draped in a sterile fashion.  Then, the   subcutaneous, intermuscular, and interligamentous region was anesthetized with   lidocaine.  A 3-1/2 inch 22 gauge spinal needle was directed under   intermittent fluoroscopic guidance to the left L4-L5 and L5-S1 facet joints,   followed by the right L4-L5 and L5-S1 facet joints.  Arthrogram of the joints   _____ a small amount of Omnipaque was placed.  This confirmed needle tip   placement by darkening of the joint lines.  A solution of 20 mg of Kenalog   along with 2% lidocaine for a total 1 mL of injected solution was placed into   each of the joints.  She tolerated the procedure well without procedure   complications.  She will be referred to the recovery area and follow up in the   office in the next week to monitor the response to injection.        ______________________________  ALFONSO MARTINEZ MD    AT/LORIE/ARASELI    DD:  12/15/2020 15:20  DT:  12/15/2020 20:20    Job#:  [id number]    CC:MD Saskia BRASWELL APRN

## 2020-12-29 ENCOUNTER — APPOINTMENT (RX ONLY)
Dept: URBAN - METROPOLITAN AREA CLINIC 4 | Facility: CLINIC | Age: 85
Setting detail: DERMATOLOGY
End: 2020-12-29

## 2020-12-29 PROBLEM — D04.39 CARCINOMA IN SITU OF SKIN OF OTHER PARTS OF FACE: Status: ACTIVE | Noted: 2020-12-29

## 2020-12-29 PROCEDURE — ? EXCISION

## 2020-12-29 PROCEDURE — 13132 CMPLX RPR F/C/C/M/N/AX/G/H/F: CPT

## 2020-12-29 PROCEDURE — 11642 EXC F/E/E/N/L MAL+MRG 1.1-2: CPT

## 2020-12-29 NOTE — PROCEDURE: EXCISION
Surgeon (Optional): Ayesha
Biopsy Photograph Reviewed: Yes
Previous Accession (Optional): [id number]
Size Of Lesion In Cm: 1.1
X Size Of Lesion In Cm (Optional): 0
Size Of Margin In Cm: 0.2
Excision Method: Elliptical
Anesthesia Volume In Cc: 12
Did You Provide Opioid Counseling: No
Repair Type: Complex
Suturegard Retention Suture: 2-0 Nylon
Retention Suture Bite Size: 3 mm
Length To Time In Minutes Device Was In Place: 10
Number Of Hemigard Strips Per Side: 1
Intermediate / Complex Repair - Final Wound Length In Cm: 4.7
Width Of Defect Perpendicular To Closure In Cm (Required): 1.3
Distance Of Undermining In Cm (Required): 1.6
Undermining Type: Entire Wound
Debridement Text: The wound edges were debrided prior to proceeding with the closure to facilitate wound healing.
Helical Rim Text: The closure involved the helical rim.
Vermilion Border Text: The closure involved the vermilion border.
Nostril Rim Text: The closure involved the nostril rim.
Retention Suture Text: Retention sutures were placed to support the closure and prevent dehiscence.
Lab: [id number]
Lab Facility: 
Graft Donor Site Bandage (Optional-Leave Blank If You Don't Want In Note): Steri-strips and a pressure bandage were applied to the donor site.
Epidermal Closure Graft Donor Site (Optional): simple interrupted
Billing Type: Third-Party Bill
Excision Depth: adipose tissue
Scalpel Size: 15 blade
Anesthesia Type: 1% lidocaine with epinephrine
Additional Anesthesia Volume In Cc: 6
Hemostasis: Electrocautery
Estimated Blood Loss (Cc): minimal
Detail Level: Detailed
Repair Anesthesia Method: local infiltration
Deep Sutures: 5-0 Vicryl
Dermal Closure: buried vertical mattress
Epidermal Sutures: 5-0 Caprosyn
Epidermal Closure: running subcuticular
Wound Care: Bacitracin
Dressing: dry sterile dressing
Suturegard Intro: Intraoperative tissue expansion was performed, utilizing the SUTUREGARD device, in order to reduce wound tension.
Suturegard Body: The suture ends were repeatedly re-tightened and re-clamped to achieve the desired tissue expansion.
Hemigard Intro: Due to skin fragility and wound tension, it was decided to use HEMIGARD adhesive retention suture devices to permit a linear closure. The skin was cleaned and dried for a 6cm distance away from the wound. Excessive hair, if present, was removed to allow for adhesion.
Hemigard Postcare Instructions: The HEMIGARD strips are to remain completely dry for at least 5-7 days.
Positioning (Leave Blank If You Do Not Want): The patient was placed in a comfortable position exposing the surgical site.
Pre-Excision Curettage Text (Leave Blank If You Do Not Want): Prior to drawing the surgical margin the visible lesion was removed with electrodesiccation and curettage to clearly define the lesion size.
Complex Repair Preamble Text (Leave Blank If You Do Not Want): Extensive wide undermining was performed.
Intermediate Repair Preamble Text (Leave Blank If You Do Not Want): Undermining was performed with blunt dissection.
Curvilinear Excision Additional Text (Leave Blank If You Do Not Want): The margin was drawn around the clinically apparent lesion.  A curvilinear shape was then drawn on the skin incorporating the lesion and margins.  Incisions were then made along these lines to the appropriate tissue plane and the lesion was extirpated.
Fusiform Excision Additional Text (Leave Blank If You Do Not Want): The margin was drawn around the clinically apparent lesion.  A fusiform shape was then drawn on the skin incorporating the lesion and margins.  Incisions were then made along these lines to the appropriate tissue plane and the lesion was extirpated.
Elliptical Excision Additional Text (Leave Blank If You Do Not Want): The margin was drawn around the clinically apparent lesion.  An elliptical shape was then drawn on the skin incorporating the lesion and margins.  Incisions were then made along these lines to the appropriate tissue plane and the lesion was extirpated.
Saucerization Excision Additional Text (Leave Blank If You Do Not Want): The margin was drawn around the clinically apparent lesion.  Incisions were then made along these lines, in a tangential fashion, to the appropriate tissue plane and the lesion was extirpated.
Slit Excision Additional Text (Leave Blank If You Do Not Want): A linear line was drawn on the skin overlying the lesion. An incision was made slowly until the lesion was visualized.  Once visualized, the lesion was removed with blunt dissection.
Excisional Biopsy Additional Text (Leave Blank If You Do Not Want): The margin was drawn around the clinically apparent lesion. An elliptical shape was then drawn on the skin incorporating the lesion and margins.  Incisions were then made along these lines to the appropriate tissue plane and the lesion was extirpated.
Perilesional Excision Additional Text (Leave Blank If You Do Not Want): The margin was drawn around the clinically apparent lesion. Incisions were then made along these lines to the appropriate tissue plane and the lesion was extirpated.
Repair Performed By Another Provider Text (Leave Blank If You Do Not Want): After the tissue was excised the defect was repaired by another provider.
No Repair - Repaired With Adjacent Surgical Defect Text (Leave Blank If You Do Not Want): After the excision the defect was repaired concurrently with another surgical defect which was in close approximation.
Advancement Flap (Single) Text: The defect edges were debeveled with a #15 scalpel blade.  Given the location of the defect and the proximity to free margins a single advancement flap was deemed most appropriate.  Using a sterile surgical marker, an appropriate advancement flap was drawn incorporating the defect and placing the expected incisions within the relaxed skin tension lines where possible.    The area thus outlined was incised deep to adipose tissue with a #15 scalpel blade.  The skin margins were undermined to an appropriate distance in all directions utilizing iris scissors.
Advancement Flap (Double) Text: The defect edges were debeveled with a #15 scalpel blade.  Given the location of the defect and the proximity to free margins a double advancement flap was deemed most appropriate.  Using a sterile surgical marker, the appropriate advancement flaps were drawn incorporating the defect and placing the expected incisions within the relaxed skin tension lines where possible.    The area thus outlined was incised deep to adipose tissue with a #15 scalpel blade.  The skin margins were undermined to an appropriate distance in all directions utilizing iris scissors.
Burow's Advancement Flap Text: The defect edges were debeveled with a #15 scalpel blade.  Given the location of the defect and the proximity to free margins a Burow's advancement flap was deemed most appropriate.  Using a sterile surgical marker, the appropriate advancement flap was drawn incorporating the defect and placing the expected incisions within the relaxed skin tension lines where possible.    The area thus outlined was incised deep to adipose tissue with a #15 scalpel blade.  The skin margins were undermined to an appropriate distance in all directions utilizing iris scissors.
Chonodrocutaneous Helical Advancement Flap Text: The defect edges were debeveled with a #15 scalpel blade.  Given the location of the defect and the proximity to free margins a chondrocutaneous helical advancement flap was deemed most appropriate.  Using a sterile surgical marker, the appropriate advancement flap was drawn incorporating the defect and placing the expected incisions within the relaxed skin tension lines where possible.    The area thus outlined was incised deep to adipose tissue with a #15 scalpel blade.  The skin margins were undermined to an appropriate distance in all directions utilizing iris scissors.
Crescentic Advancement Flap Text: The defect edges were debeveled with a #15 scalpel blade.  Given the location of the defect and the proximity to free margins a crescentic advancement flap was deemed most appropriate.  Using a sterile surgical marker, the appropriate advancement flap was drawn incorporating the defect and placing the expected incisions within the relaxed skin tension lines where possible.    The area thus outlined was incised deep to adipose tissue with a #15 scalpel blade.  The skin margins were undermined to an appropriate distance in all directions utilizing iris scissors.
A-T Advancement Flap Text: The defect edges were debeveled with a #15 scalpel blade.  Given the location of the defect, shape of the defect and the proximity to free margins an A-T advancement flap was deemed most appropriate.  Using a sterile surgical marker, an appropriate advancement flap was drawn incorporating the defect and placing the expected incisions within the relaxed skin tension lines where possible.    The area thus outlined was incised deep to adipose tissue with a #15 scalpel blade.  The skin margins were undermined to an appropriate distance in all directions utilizing iris scissors.
O-T Advancement Flap Text: The defect edges were debeveled with a #15 scalpel blade.  Given the location of the defect, shape of the defect and the proximity to free margins an O-T advancement flap was deemed most appropriate.  Using a sterile surgical marker, an appropriate advancement flap was drawn incorporating the defect and placing the expected incisions within the relaxed skin tension lines where possible.    The area thus outlined was incised deep to adipose tissue with a #15 scalpel blade.  The skin margins were undermined to an appropriate distance in all directions utilizing iris scissors.
O-L Flap Text: The defect edges were debeveled with a #15 scalpel blade.  Given the location of the defect, shape of the defect and the proximity to free margins an O-L flap was deemed most appropriate.  Using a sterile surgical marker, an appropriate advancement flap was drawn incorporating the defect and placing the expected incisions within the relaxed skin tension lines where possible.    The area thus outlined was incised deep to adipose tissue with a #15 scalpel blade.  The skin margins were undermined to an appropriate distance in all directions utilizing iris scissors.
O-Z Flap Text: The defect edges were debeveled with a #15 scalpel blade.  Given the location of the defect, shape of the defect and the proximity to free margins an O-Z flap was deemed most appropriate.  Using a sterile surgical marker, an appropriate transposition flap was drawn incorporating the defect and placing the expected incisions within the relaxed skin tension lines where possible. The area thus outlined was incised deep to adipose tissue with a #15 scalpel blade.  The skin margins were undermined to an appropriate distance in all directions utilizing iris scissors.
Double O-Z Flap Text: The defect edges were debeveled with a #15 scalpel blade.  Given the location of the defect, shape of the defect and the proximity to free margins a Double O-Z flap was deemed most appropriate.  Using a sterile surgical marker, an appropriate transposition flap was drawn incorporating the defect and placing the expected incisions within the relaxed skin tension lines where possible. The area thus outlined was incised deep to adipose tissue with a #15 scalpel blade.  The skin margins were undermined to an appropriate distance in all directions utilizing iris scissors.
V-Y Flap Text: The defect edges were debeveled with a #15 scalpel blade.  Given the location of the defect, shape of the defect and the proximity to free margins a V-Y flap was deemed most appropriate.  Using a sterile surgical marker, an appropriate advancement flap was drawn incorporating the defect and placing the expected incisions within the relaxed skin tension lines where possible.    The area thus outlined was incised deep to adipose tissue with a #15 scalpel blade.  The skin margins were undermined to an appropriate distance in all directions utilizing iris scissors.
Advancement-Rotation Flap Text: The defect edges were debeveled with a #15 scalpel blade.  Given the location of the defect, shape of the defect and the proximity to free margins an advancement-rotation flap was deemed most appropriate.  Using a sterile surgical marker, an appropriate flap was drawn incorporating the defect and placing the expected incisions within the relaxed skin tension lines where possible. The area thus outlined was incised deep to adipose tissue with a #15 scalpel blade.  The skin margins were undermined to an appropriate distance in all directions utilizing iris scissors.
Mercedes Flap Text: The defect edges were debeveled with a #15 scalpel blade.  Given the location of the defect, shape of the defect and the proximity to free margins a Mercedes flap was deemed most appropriate.  Using a sterile surgical marker, an appropriate advancement flap was drawn incorporating the defect and placing the expected incisions within the relaxed skin tension lines where possible. The area thus outlined was incised deep to adipose tissue with a #15 scalpel blade.  The skin margins were undermined to an appropriate distance in all directions utilizing iris scissors.
Modified Advancement Flap Text: The defect edges were debeveled with a #15 scalpel blade.  Given the location of the defect, shape of the defect and the proximity to free margins a modified advancement flap was deemed most appropriate.  Using a sterile surgical marker, an appropriate advancement flap was drawn incorporating the defect and placing the expected incisions within the relaxed skin tension lines where possible.    The area thus outlined was incised deep to adipose tissue with a #15 scalpel blade.  The skin margins were undermined to an appropriate distance in all directions utilizing iris scissors.
Mucosal Advancement Flap Text: Given the location of the defect, shape of the defect and the proximity to free margins a mucosal advancement flap was deemed most appropriate. Incisions were made with a 15 blade scalpel in the appropriate fashion along the cutaneous vermilion border and the mucosal lip. The remaining actinically damaged mucosal tissue was excised.  The mucosal advancement flap was then elevated to the gingival sulcus with care taken to preserve the neurovascular structures and advanced into the primary defect. Care was taken to ensure that precise realignment of the vermilion border was achieved.
Peng Advancement Flap Text: The defect edges were debeveled with a #15 scalpel blade.  Given the location of the defect, shape of the defect and the proximity to free margins a Peng advancement flap was deemed most appropriate.  Using a sterile surgical marker, an appropriate advancement flap was drawn incorporating the defect and placing the expected incisions within the relaxed skin tension lines where possible. The area thus outlined was incised deep to adipose tissue with a #15 scalpel blade.  The skin margins were undermined to an appropriate distance in all directions utilizing iris scissors.
Hatchet Flap Text: The defect edges were debeveled with a #15 scalpel blade.  Given the location of the defect, shape of the defect and the proximity to free margins a hatchet flap was deemed most appropriate.  Using a sterile surgical marker, an appropriate hatchet flap was drawn incorporating the defect and placing the expected incisions within the relaxed skin tension lines where possible.    The area thus outlined was incised deep to adipose tissue with a #15 scalpel blade.  The skin margins were undermined to an appropriate distance in all directions utilizing iris scissors.
Rotation Flap Text: The defect edges were debeveled with a #15 scalpel blade.  Given the location of the defect, shape of the defect and the proximity to free margins a rotation flap was deemed most appropriate.  Using a sterile surgical marker, an appropriate rotation flap was drawn incorporating the defect and placing the expected incisions within the relaxed skin tension lines where possible.    The area thus outlined was incised deep to adipose tissue with a #15 scalpel blade.  The skin margins were undermined to an appropriate distance in all directions utilizing iris scissors.
Spiral Flap Text: The defect edges were debeveled with a #15 scalpel blade.  Given the location of the defect, shape of the defect and the proximity to free margins a spiral flap was deemed most appropriate.  Using a sterile surgical marker, an appropriate rotation flap was drawn incorporating the defect and placing the expected incisions within the relaxed skin tension lines where possible. The area thus outlined was incised deep to adipose tissue with a #15 scalpel blade.  The skin margins were undermined to an appropriate distance in all directions utilizing iris scissors.
Star Wedge Flap Text: The defect edges were debeveled with a #15 scalpel blade.  Given the location of the defect, shape of the defect and the proximity to free margins a star wedge flap was deemed most appropriate.  Using a sterile surgical marker, an appropriate rotation flap was drawn incorporating the defect and placing the expected incisions within the relaxed skin tension lines where possible. The area thus outlined was incised deep to adipose tissue with a #15 scalpel blade.  The skin margins were undermined to an appropriate distance in all directions utilizing iris scissors.
Transposition Flap Text: The defect edges were debeveled with a #15 scalpel blade.  Given the location of the defect and the proximity to free margins a transposition flap was deemed most appropriate.  Using a sterile surgical marker, an appropriate transposition flap was drawn incorporating the defect.    The area thus outlined was incised deep to adipose tissue with a #15 scalpel blade.  The skin margins were undermined to an appropriate distance in all directions utilizing iris scissors.
Muscle Hinge Flap Text: The defect edges were debeveled with a #15 scalpel blade.  Given the size, depth and location of the defect and the proximity to free margins a muscle hinge flap was deemed most appropriate.  Using a sterile surgical marker, an appropriate hinge flap was drawn incorporating the defect. The area thus outlined was incised with a #15 scalpel blade.  The skin margins were undermined to an appropriate distance in all directions utilizing iris scissors.
Nasal Turnover Hinge Flap Text: The defect edges were debeveled with a #15 scalpel blade.  Given the size, depth, location of the defect and the defect being full thickness a nasal turnover hinge flap was deemed most appropriate.  Using a sterile surgical marker, an appropriate hinge flap was drawn incorporating the defect. The area thus outlined was incised with a #15 scalpel blade. The flap was designed to recreate the nasal mucosal lining and the alar rim. The skin margins were undermined to an appropriate distance in all directions utilizing iris scissors.
Nasalis-Muscle-Based Myocutaneous Island Pedicle Flap Text: Using a #15 blade, an incision was made around the donor flap to the level of the nasalis muscle. Wide lateral undermining was then performed in both the subcutaneous plane above the nasalis muscle, and in a submuscular plane just above periosteum. This allowed the formation of a free nasalis muscle axial pedicle (based on the angular artery) which was still attached to the actual cutaneous flap, increasing its mobility and vascular viability. Hemostasis was obtained with pinpoint electrocoagulation. The flap was mobilized into position and the pivotal anchor points positioned and stabilized with buried interrupted sutures. Subcutaneous and dermal tissues were closed in a multilayered fashion with sutures. Tissue redundancies were excised, and the epidermal edges were apposed without significant tension and sutured with sutures.
Orbicularis Oris Muscle Flap Text: The defect edges were debeveled with a #15 scalpel blade.  Given that the defect affected the competency of the oral sphincter an obicularis oris muscle flap was deemed most appropriate to restore this competency and normal muscle function.  Using a sterile surgical marker, an appropriate flap was drawn incorporating the defect. The area thus outlined was incised with a #15 scalpel blade.
Melolabial Transposition Flap Text: The defect edges were debeveled with a #15 scalpel blade.  Given the location of the defect and the proximity to free margins a melolabial flap was deemed most appropriate.  Using a sterile surgical marker, an appropriate melolabial transposition flap was drawn incorporating the defect.    The area thus outlined was incised deep to adipose tissue with a #15 scalpel blade.  The skin margins were undermined to an appropriate distance in all directions utilizing iris scissors.
Rhombic Flap Text: The defect edges were debeveled with a #15 scalpel blade.  Given the location of the defect and the proximity to free margins a rhombic flap was deemed most appropriate.  Using a sterile surgical marker, an appropriate rhombic flap was drawn incorporating the defect.    The area thus outlined was incised deep to adipose tissue with a #15 scalpel blade.  The skin margins were undermined to an appropriate distance in all directions utilizing iris scissors.
Rhomboid Transposition Flap Text: The defect edges were debeveled with a #15 scalpel blade.  Given the location of the defect and the proximity to free margins a rhomboid transposition flap was deemed most appropriate.  Using a sterile surgical marker, an appropriate rhomboid flap was drawn incorporating the defect.    The area thus outlined was incised deep to adipose tissue with a #15 scalpel blade.  The skin margins were undermined to an appropriate distance in all directions utilizing iris scissors.
Bi-Rhombic Flap Text: The defect edges were debeveled with a #15 scalpel blade.  Given the location of the defect and the proximity to free margins a bi-rhombic flap was deemed most appropriate.  Using a sterile surgical marker, an appropriate rhombic flap was drawn incorporating the defect. The area thus outlined was incised deep to adipose tissue with a #15 scalpel blade.  The skin margins were undermined to an appropriate distance in all directions utilizing iris scissors.
Helical Rim Advancement Flap Text: The defect edges were debeveled with a #15 blade scalpel.  Given the location of the defect and the proximity to free margins (helical rim) a double helical rim advancement flap was deemed most appropriate.  Using a sterile surgical marker, the appropriate advancement flaps were drawn incorporating the defect and placing the expected incisions between the helical rim and antihelix where possible.  The area thus outlined was incised through and through with a #15 scalpel blade.  With a skin hook and iris scissors, the flaps were gently and sharply undermined and freed up.
Bilateral Helical Rim Advancement Flap Text: The defect edges were debeveled with a #15 blade scalpel.  Given the location of the defect and the proximity to free margins (helical rim) a bilateral helical rim advancement flap was deemed most appropriate.  Using a sterile surgical marker, the appropriate advancement flaps were drawn incorporating the defect and placing the expected incisions between the helical rim and antihelix where possible.  The area thus outlined was incised through and through with a #15 scalpel blade.  With a skin hook and iris scissors, the flaps were gently and sharply undermined and freed up.
Ear Star Wedge Flap Text: The defect edges were debeveled with a #15 blade scalpel.  Given the location of the defect and the proximity to free margins (helical rim) an ear star wedge flap was deemed most appropriate.  Using a sterile surgical marker, the appropriate flap was drawn incorporating the defect and placing the expected incisions between the helical rim and antihelix where possible.  The area thus outlined was incised through and through with a #15 scalpel blade.
Banner Transposition Flap Text: The defect edges were debeveled with a #15 scalpel blade.  Given the location of the defect and the proximity to free margins a Banner transposition flap was deemed most appropriate.  Using a sterile surgical marker, an appropriate flap drawn around the defect. The area thus outlined was incised deep to adipose tissue with a #15 scalpel blade.  The skin margins were undermined to an appropriate distance in all directions utilizing iris scissors.
Bilobed Flap Text: The defect edges were debeveled with a #15 scalpel blade.  Given the location of the defect and the proximity to free margins a bilobe flap was deemed most appropriate.  Using a sterile surgical marker, an appropriate bilobe flap drawn around the defect.    The area thus outlined was incised deep to adipose tissue with a #15 scalpel blade.  The skin margins were undermined to an appropriate distance in all directions utilizing iris scissors.
Bilobed Transposition Flap Text: The defect edges were debeveled with a #15 scalpel blade.  Given the location of the defect and the proximity to free margins a bilobed transposition flap was deemed most appropriate.  Using a sterile surgical marker, an appropriate bilobe flap drawn around the defect.    The area thus outlined was incised deep to adipose tissue with a #15 scalpel blade.  The skin margins were undermined to an appropriate distance in all directions utilizing iris scissors.
Trilobed Flap Text: The defect edges were debeveled with a #15 scalpel blade.  Given the location of the defect and the proximity to free margins a trilobed flap was deemed most appropriate.  Using a sterile surgical marker, an appropriate trilobed flap drawn around the defect.    The area thus outlined was incised deep to adipose tissue with a #15 scalpel blade.  The skin margins were undermined to an appropriate distance in all directions utilizing iris scissors.
Dorsal Nasal Flap Text: The defect edges were debeveled with a #15 scalpel blade.  Given the location of the defect and the proximity to free margins a dorsal nasal flap was deemed most appropriate.  Using a sterile surgical marker, an appropriate dorsal nasal flap was drawn around the defect.    The area thus outlined was incised deep to adipose tissue with a #15 scalpel blade.  The skin margins were undermined to an appropriate distance in all directions utilizing iris scissors.
Island Pedicle Flap Text: The defect edges were debeveled with a #15 scalpel blade.  Given the location of the defect, shape of the defect and the proximity to free margins an island pedicle advancement flap was deemed most appropriate.  Using a sterile surgical marker, an appropriate advancement flap was drawn incorporating the defect, outlining the appropriate donor tissue and placing the expected incisions within the relaxed skin tension lines where possible.    The area thus outlined was incised deep to adipose tissue with a #15 scalpel blade.  The skin margins were undermined to an appropriate distance in all directions around the primary defect and laterally outward around the island pedicle utilizing iris scissors.  There was minimal undermining beneath the pedicle flap.
Island Pedicle Flap With Canthal Suspension Text: The defect edges were debeveled with a #15 scalpel blade.  Given the location of the defect, shape of the defect and the proximity to free margins an island pedicle advancement flap was deemed most appropriate.  Using a sterile surgical marker, an appropriate advancement flap was drawn incorporating the defect, outlining the appropriate donor tissue and placing the expected incisions within the relaxed skin tension lines where possible. The area thus outlined was incised deep to adipose tissue with a #15 scalpel blade.  The skin margins were undermined to an appropriate distance in all directions around the primary defect and laterally outward around the island pedicle utilizing iris scissors.  There was minimal undermining beneath the pedicle flap. A suspension suture was placed in the canthal tendon to prevent tension and prevent ectropion.
Alar Island Pedicle Flap Text: The defect edges were debeveled with a #15 scalpel blade.  Given the location of the defect, shape of the defect and the proximity to the alar rim an island pedicle advancement flap was deemed most appropriate.  Using a sterile surgical marker, an appropriate advancement flap was drawn incorporating the defect, outlining the appropriate donor tissue and placing the expected incisions within the nasal ala running parallel to the alar rim. The area thus outlined was incised with a #15 scalpel blade.  The skin margins were undermined minimally to an appropriate distance in all directions around the primary defect and laterally outward around the island pedicle utilizing iris scissors.  There was minimal undermining beneath the pedicle flap.
Double Island Pedicle Flap Text: The defect edges were debeveled with a #15 scalpel blade.  Given the location of the defect, shape of the defect and the proximity to free margins a double island pedicle advancement flap was deemed most appropriate.  Using a sterile surgical marker, an appropriate advancement flap was drawn incorporating the defect, outlining the appropriate donor tissue and placing the expected incisions within the relaxed skin tension lines where possible.    The area thus outlined was incised deep to adipose tissue with a #15 scalpel blade.  The skin margins were undermined to an appropriate distance in all directions around the primary defect and laterally outward around the island pedicle utilizing iris scissors.  There was minimal undermining beneath the pedicle flap.
Island Pedicle Flap-Requiring Vessel Identification Text: The defect edges were debeveled with a #15 scalpel blade.  Given the location of the defect, shape of the defect and the proximity to free margins an island pedicle advancement flap was deemed most appropriate.  Using a sterile surgical marker, an appropriate advancement flap was drawn, based on the axial vessel mentioned above, incorporating the defect, outlining the appropriate donor tissue and placing the expected incisions within the relaxed skin tension lines where possible.    The area thus outlined was incised deep to adipose tissue with a #15 scalpel blade.  The skin margins were undermined to an appropriate distance in all directions around the primary defect and laterally outward around the island pedicle utilizing iris scissors.  There was minimal undermining beneath the pedicle flap.
Keystone Flap Text: The defect edges were debeveled with a #15 scalpel blade.  Given the location of the defect, shape of the defect a keystone flap was deemed most appropriate.  Using a sterile surgical marker, an appropriate keystone flap was drawn incorporating the defect, outlining the appropriate donor tissue and placing the expected incisions within the relaxed skin tension lines where possible. The area thus outlined was incised deep to adipose tissue with a #15 scalpel blade.  The skin margins were undermined to an appropriate distance in all directions around the primary defect and laterally outward around the flap utilizing iris scissors.
O-T Plasty Text: The defect edges were debeveled with a #15 scalpel blade.  Given the location of the defect, shape of the defect and the proximity to free margins an O-T plasty was deemed most appropriate.  Using a sterile surgical marker, an appropriate O-T plasty was drawn incorporating the defect and placing the expected incisions within the relaxed skin tension lines where possible.    The area thus outlined was incised deep to adipose tissue with a #15 scalpel blade.  The skin margins were undermined to an appropriate distance in all directions utilizing iris scissors.
O-Z Plasty Text: The defect edges were debeveled with a #15 scalpel blade.  Given the location of the defect, shape of the defect and the proximity to free margins an O-Z plasty (double transposition flap) was deemed most appropriate.  Using a sterile surgical marker, the appropriate transposition flaps were drawn incorporating the defect and placing the expected incisions within the relaxed skin tension lines where possible.    The area thus outlined was incised deep to adipose tissue with a #15 scalpel blade.  The skin margins were undermined to an appropriate distance in all directions utilizing iris scissors.  Hemostasis was achieved with electrocautery.  The flaps were then transposed into place, one clockwise and the other counterclockwise, and anchored with interrupted buried subcutaneous sutures.
Double O-Z Plasty Text: The defect edges were debeveled with a #15 scalpel blade.  Given the location of the defect, shape of the defect and the proximity to free margins a Double O-Z plasty (double transposition flap) was deemed most appropriate.  Using a sterile surgical marker, the appropriate transposition flaps were drawn incorporating the defect and placing the expected incisions within the relaxed skin tension lines where possible. The area thus outlined was incised deep to adipose tissue with a #15 scalpel blade.  The skin margins were undermined to an appropriate distance in all directions utilizing iris scissors.  Hemostasis was achieved with electrocautery.  The flaps were then transposed into place, one clockwise and the other counterclockwise, and anchored with interrupted buried subcutaneous sutures.
V-Y Plasty Text: The defect edges were debeveled with a #15 scalpel blade.  Given the location of the defect, shape of the defect and the proximity to free margins an V-Y advancement flap was deemed most appropriate.  Using a sterile surgical marker, an appropriate advancement flap was drawn incorporating the defect and placing the expected incisions within the relaxed skin tension lines where possible.    The area thus outlined was incised deep to adipose tissue with a #15 scalpel blade.  The skin margins were undermined to an appropriate distance in all directions utilizing iris scissors.
H Plasty Text: Given the location of the defect, shape of the defect and the proximity to free margins a H-plasty was deemed most appropriate for repair.  Using a sterile surgical marker, the appropriate advancement arms of the H-plasty were drawn incorporating the defect and placing the expected incisions within the relaxed skin tension lines where possible. The area thus outlined was incised deep to adipose tissue with a #15 scalpel blade. The skin margins were undermined to an appropriate distance in all directions utilizing iris scissors.  The opposing advancement arms were then advanced into place in opposite direction and anchored with interrupted buried subcutaneous sutures.
W Plasty Text: The lesion was extirpated to the level of the fat with a #15 scalpel blade.  Given the location of the defect, shape of the defect and the proximity to free margins a W-plasty was deemed most appropriate for repair.  Using a sterile surgical marker, the appropriate transposition arms of the W-plasty were drawn incorporating the defect and placing the expected incisions within the relaxed skin tension lines where possible.    The area thus outlined was incised deep to adipose tissue with a #15 scalpel blade.  The skin margins were undermined to an appropriate distance in all directions utilizing iris scissors.  The opposing transposition arms were then transposed into place in opposite direction and anchored with interrupted buried subcutaneous sutures.
Z Plasty Text: The lesion was extirpated to the level of the fat with a #15 scalpel blade.  Given the location of the defect, shape of the defect and the proximity to free margins a Z-plasty was deemed most appropriate for repair.  Using a sterile surgical marker, the appropriate transposition arms of the Z-plasty were drawn incorporating the defect and placing the expected incisions within the relaxed skin tension lines where possible.    The area thus outlined was incised deep to adipose tissue with a #15 scalpel blade.  The skin margins were undermined to an appropriate distance in all directions utilizing iris scissors.  The opposing transposition arms were then transposed into place in opposite direction and anchored with interrupted buried subcutaneous sutures.
Zygomaticofacial Flap Text: Given the location of the defect, shape of the defect and the proximity to free margins a zygomaticofacial flap was deemed most appropriate for repair.  Using a sterile surgical marker, the appropriate flap was drawn incorporating the defect and placing the expected incisions within the relaxed skin tension lines where possible. The area thus outlined was incised deep to adipose tissue with a #15 scalpel blade with preservation of a vascular pedicle.  The skin margins were undermined to an appropriate distance in all directions utilizing iris scissors.  The flap was then placed into the defect and anchored with interrupted buried subcutaneous sutures.
Cheek Interpolation Flap Text: A decision was made to reconstruct the defect utilizing an interpolation axial flap and a staged reconstruction.  A telfa template was made of the defect.  This telfa template was then used to outline the Cheek Interpolation flap.  The donor area for the pedicle flap was then injected with anesthesia.  The flap was excised through the skin and subcutaneous tissue down to the layer of the underlying musculature.  The interpolation flap was carefully excised within this deep plane to maintain its blood supply.  The edges of the donor site were undermined.   The donor site was closed in a primary fashion.  The pedicle was then rotated into position and sutured.  Once the tube was sutured into place, adequate blood supply was confirmed with blanching and refill.  The pedicle was then wrapped with xeroform gauze and dressed appropriately with a telfa and gauze bandage to ensure continued blood supply and protect the attached pedicle.
Cheek-To-Nose Interpolation Flap Text: A decision was made to reconstruct the defect utilizing an interpolation axial flap and a staged reconstruction.  A telfa template was made of the defect.  This telfa template was then used to outline the Cheek-To-Nose Interpolation flap.  The donor area for the pedicle flap was then injected with anesthesia.  The flap was excised through the skin and subcutaneous tissue down to the layer of the underlying musculature.  The interpolation flap was carefully excised within this deep plane to maintain its blood supply.  The edges of the donor site were undermined.   The donor site was closed in a primary fashion.  The pedicle was then rotated into position and sutured.  Once the tube was sutured into place, adequate blood supply was confirmed with blanching and refill.  The pedicle was then wrapped with xeroform gauze and dressed appropriately with a telfa and gauze bandage to ensure continued blood supply and protect the attached pedicle.
Interpolation Flap Text: A decision was made to reconstruct the defect utilizing an interpolation axial flap and a staged reconstruction.  A telfa template was made of the defect.  This telfa template was then used to outline the interpolation flap.  The donor area for the pedicle flap was then injected with anesthesia.  The flap was excised through the skin and subcutaneous tissue down to the layer of the underlying musculature.  The interpolation flap was carefully excised within this deep plane to maintain its blood supply.  The edges of the donor site were undermined.   The donor site was closed in a primary fashion.  The pedicle was then rotated into position and sutured.  Once the tube was sutured into place, adequate blood supply was confirmed with blanching and refill.  The pedicle was then wrapped with xeroform gauze and dressed appropriately with a telfa and gauze bandage to ensure continued blood supply and protect the attached pedicle.
Melolabial Interpolation Flap Text: A decision was made to reconstruct the defect utilizing an interpolation axial flap and a staged reconstruction.  A telfa template was made of the defect.  This telfa template was then used to outline the melolabial interpolation flap.  The donor area for the pedicle flap was then injected with anesthesia.  The flap was excised through the skin and subcutaneous tissue down to the layer of the underlying musculature.  The pedicle flap was carefully excised within this deep plane to maintain its blood supply.  The edges of the donor site were undermined.   The donor site was closed in a primary fashion.  The pedicle was then rotated into position and sutured.  Once the tube was sutured into place, adequate blood supply was confirmed with blanching and refill.  The pedicle was then wrapped with xeroform gauze and dressed appropriately with a telfa and gauze bandage to ensure continued blood supply and protect the attached pedicle.
Mastoid Interpolation Flap Text: A decision was made to reconstruct the defect utilizing an interpolation axial flap and a staged reconstruction.  A telfa template was made of the defect.  This telfa template was then used to outline the mastoid interpolation flap.  The donor area for the pedicle flap was then injected with anesthesia.  The flap was excised through the skin and subcutaneous tissue down to the layer of the underlying musculature.  The pedicle flap was carefully excised within this deep plane to maintain its blood supply.  The edges of the donor site were undermined.   The donor site was closed in a primary fashion.  The pedicle was then rotated into position and sutured.  Once the tube was sutured into place, adequate blood supply was confirmed with blanching and refill.  The pedicle was then wrapped with xeroform gauze and dressed appropriately with a telfa and gauze bandage to ensure continued blood supply and protect the attached pedicle.
Posterior Auricular Interpolation Flap Text: A decision was made to reconstruct the defect utilizing an interpolation axial flap and a staged reconstruction.  A telfa template was made of the defect.  This telfa template was then used to outline the posterior auricular interpolation flap.  The donor area for the pedicle flap was then injected with anesthesia.  The flap was excised through the skin and subcutaneous tissue down to the layer of the underlying musculature.  The pedicle flap was carefully excised within this deep plane to maintain its blood supply.  The edges of the donor site were undermined.   The donor site was closed in a primary fashion.  The pedicle was then rotated into position and sutured.  Once the tube was sutured into place, adequate blood supply was confirmed with blanching and refill.  The pedicle was then wrapped with xeroform gauze and dressed appropriately with a telfa and gauze bandage to ensure continued blood supply and protect the attached pedicle.
Paramedian Forehead Flap Text: A decision was made to reconstruct the defect utilizing an interpolation axial flap and a staged reconstruction.  A telfa template was made of the defect.  This telfa template was then used to outline the paramedian forehead pedicle flap.  The donor area for the pedicle flap was then injected with anesthesia.  The flap was excised through the skin and subcutaneous tissue down to the layer of the underlying musculature.  The pedicle flap was carefully excised within this deep plane to maintain its blood supply.  The edges of the donor site were undermined.   The donor site was closed in a primary fashion.  The pedicle was then rotated into position and sutured.  Once the tube was sutured into place, adequate blood supply was confirmed with blanching and refill.  The pedicle was then wrapped with xeroform gauze and dressed appropriately with a telfa and gauze bandage to ensure continued blood supply and protect the attached pedicle.
Lip Wedge Excision Repair Text: Given the location of the defect and the proximity to free margins a full thickness wedge repair was deemed most appropriate.  Using a sterile surgical marker, the appropriate repair was drawn incorporating the defect and placing the expected incisions perpendicular to the vermilion border.  The vermilion border was also meticulously outlined to ensure appropriate reapproximation during the repair.  The area thus outlined was incised through and through with a #15 scalpel blade.  The muscularis and dermis were reaproximated with deep sutures following hemostasis. Care was taken to realign the vermilion border before proceeding with the superficial closure.  Once the vermilion was realigned the superfical and mucosal closure was finished.
Ftsg Text: The defect edges were debeveled with a #15 scalpel blade.  Given the location of the defect, shape of the defect and the proximity to free margins a full thickness skin graft was deemed most appropriate.  Using a sterile surgical marker, the primary defect shape was transferred to the donor site. The area thus outlined was incised deep to adipose tissue with a #15 scalpel blade.  The harvested graft was then trimmed of adipose tissue until only dermis and epidermis was left.  The skin margins of the secondary defect were undermined to an appropriate distance in all directions utilizing iris scissors.  The secondary defect was closed with interrupted buried subcutaneous sutures.  The skin edges were then re-apposed with running  sutures.  The skin graft was then placed in the primary defect and oriented appropriately.
Split-Thickness Skin Graft Text: The defect edges were debeveled with a #15 scalpel blade.  Given the location of the defect, shape of the defect and the proximity to free margins a split thickness skin graft was deemed most appropriate.  Using a sterile surgical marker, the primary defect shape was transferred to the donor site. The split thickness graft was then harvested.  The skin graft was then placed in the primary defect and oriented appropriately.
Burow's Graft Text: The defect edges were debeveled with a #15 scalpel blade.  Given the location of the defect, shape of the defect, the proximity to free margins and the presence of a standing cone deformity a Burow's skin graft was deemed most appropriate. The standing cone was removed and this tissue was then trimmed to the shape of the primary defect. The adipose tissue was also removed until only dermis and epidermis were left.  The skin margins of the secondary defect were undermined to an appropriate distance in all directions utilizing iris scissors.  The secondary defect was closed with interrupted buried subcutaneous sutures.  The skin edges were then re-apposed with running  sutures.  The skin graft was then placed in the primary defect and oriented appropriately.
Cartilage Graft Text: The defect edges were debeveled with a #15 scalpel blade.  Given the location of the defect, shape of the defect, the fact the defect involved a full thickness cartilage defect a cartilage graft was deemed most appropriate.  An appropriate donor site was identified, cleansed, and anesthetized. The cartilage graft was then harvested and transferred to the recipient site, oriented appropriately and then sutured into place.  The secondary defect was then repaired using a primary closure.
Composite Graft Text: The defect edges were debeveled with a #15 scalpel blade.  Given the location of the defect, shape of the defect, the proximity to free margins and the fact the defect was full thickness a composite graft was deemed most appropriate.  The defect was outline and then transferred to the donor site.  A full thickness graft was then excised from the donor site. The graft was then placed in the primary defect, oriented appropriately and then sutured into place.  The secondary defect was then repaired using a primary closure.
Epidermal Autograft Text: The defect edges were debeveled with a #15 scalpel blade.  Given the location of the defect, shape of the defect and the proximity to free margins an epidermal autograft was deemed most appropriate.  Using a sterile surgical marker, the primary defect shape was transferred to the donor site. The epidermal graft was then harvested.  The skin graft was then placed in the primary defect and oriented appropriately.
Dermal Autograft Text: The defect edges were debeveled with a #15 scalpel blade.  Given the location of the defect, shape of the defect and the proximity to free margins a dermal autograft was deemed most appropriate.  Using a sterile surgical marker, the primary defect shape was transferred to the donor site. The area thus outlined was incised deep to adipose tissue with a #15 scalpel blade.  The harvested graft was then trimmed of adipose and epidermal tissue until only dermis was left.  The skin graft was then placed in the primary defect and oriented appropriately.
Skin Substitute Text: The defect edges were debeveled with a #15 scalpel blade.  Given the location of the defect, shape of the defect and the proximity to free margins a skin substitute graft was deemed most appropriate.  The graft material was trimmed to fit the size of the defect. The graft was then placed in the primary defect and oriented appropriately.
Tissue Cultured Epidermal Autograft Text: The defect edges were debeveled with a #15 scalpel blade.  Given the location of the defect, shape of the defect and the proximity to free margins a tissue cultured epidermal autograft was deemed most appropriate.  The graft was then trimmed to fit the size of the defect.  The graft was then placed in the primary defect and oriented appropriately.
Xenograft Text: The defect edges were debeveled with a #15 scalpel blade.  Given the location of the defect, shape of the defect and the proximity to free margins a xenograft was deemed most appropriate.  The graft was then trimmed to fit the size of the defect.  The graft was then placed in the primary defect and oriented appropriately.
Purse String (Intermediate) Text: Given the location of the defect and the characteristics of the surrounding skin a purse string intermediate closure was deemed most appropriate.  Undermining was performed circumfirentially around the surgical defect.  A purse string suture was then placed and tightened.
Purse String (Simple) Text: Given the location of the defect and the characteristics of the surrounding skin a purse string simple closure was deemed most appropriate.  Undermining was performed circumferentially around the surgical defect.  A purse string suture was then placed and tightened.
Partial Purse String (Intermediate) Text: Given the location of the defect and the characteristics of the surrounding skin an intermediate purse string closure was deemed most appropriate.  Undermining was performed circumferentially around the surgical defect.  A purse string suture was then placed and tightened. Wound tension of the circular defect prevented complete closure of the wound.
Partial Purse String (Simple) Text: Given the location of the defect and the characteristics of the surrounding skin a simple purse string closure was deemed most appropriate.  Undermining was performed circumferentially around the surgical defect.  A purse string suture was then placed and tightened. Wound tension of the circular defect prevented complete closure of the wound.
Complex Repair And Single Advancement Flap Text: The defect edges were debeveled with a #15 scalpel blade.  The primary defect was closed partially with a complex linear closure.  Given the location of the remaining defect, shape of the defect and the proximity to free margins a single advancement flap was deemed most appropriate for complete closure of the defect.  Using a sterile surgical marker, an appropriate advancement flap was drawn incorporating the defect and placing the expected incisions within the relaxed skin tension lines where possible.    The area thus outlined was incised deep to adipose tissue with a #15 scalpel blade.  The skin margins were undermined to an appropriate distance in all directions utilizing iris scissors.
Complex Repair And Double Advancement Flap Text: The defect edges were debeveled with a #15 scalpel blade.  The primary defect was closed partially with a complex linear closure.  Given the location of the remaining defect, shape of the defect and the proximity to free margins a double advancement flap was deemed most appropriate for complete closure of the defect.  Using a sterile surgical marker, an appropriate advancement flap was drawn incorporating the defect and placing the expected incisions within the relaxed skin tension lines where possible.    The area thus outlined was incised deep to adipose tissue with a #15 scalpel blade.  The skin margins were undermined to an appropriate distance in all directions utilizing iris scissors.
Complex Repair And Modified Advancement Flap Text: The defect edges were debeveled with a #15 scalpel blade.  The primary defect was closed partially with a complex linear closure.  Given the location of the remaining defect, shape of the defect and the proximity to free margins a modified advancement flap was deemed most appropriate for complete closure of the defect.  Using a sterile surgical marker, an appropriate advancement flap was drawn incorporating the defect and placing the expected incisions within the relaxed skin tension lines where possible.    The area thus outlined was incised deep to adipose tissue with a #15 scalpel blade.  The skin margins were undermined to an appropriate distance in all directions utilizing iris scissors.
Complex Repair And A-T Advancement Flap Text: The defect edges were debeveled with a #15 scalpel blade.  The primary defect was closed partially with a complex linear closure.  Given the location of the remaining defect, shape of the defect and the proximity to free margins an A-T advancement flap was deemed most appropriate for complete closure of the defect.  Using a sterile surgical marker, an appropriate advancement flap was drawn incorporating the defect and placing the expected incisions within the relaxed skin tension lines where possible.    The area thus outlined was incised deep to adipose tissue with a #15 scalpel blade.  The skin margins were undermined to an appropriate distance in all directions utilizing iris scissors.
Complex Repair And O-T Advancement Flap Text: The defect edges were debeveled with a #15 scalpel blade.  The primary defect was closed partially with a complex linear closure.  Given the location of the remaining defect, shape of the defect and the proximity to free margins an O-T advancement flap was deemed most appropriate for complete closure of the defect.  Using a sterile surgical marker, an appropriate advancement flap was drawn incorporating the defect and placing the expected incisions within the relaxed skin tension lines where possible.    The area thus outlined was incised deep to adipose tissue with a #15 scalpel blade.  The skin margins were undermined to an appropriate distance in all directions utilizing iris scissors.
Complex Repair And O-L Flap Text: The defect edges were debeveled with a #15 scalpel blade.  The primary defect was closed partially with a complex linear closure.  Given the location of the remaining defect, shape of the defect and the proximity to free margins an O-L flap was deemed most appropriate for complete closure of the defect.  Using a sterile surgical marker, an appropriate flap was drawn incorporating the defect and placing the expected incisions within the relaxed skin tension lines where possible.    The area thus outlined was incised deep to adipose tissue with a #15 scalpel blade.  The skin margins were undermined to an appropriate distance in all directions utilizing iris scissors.
Complex Repair And Bilobe Flap Text: The defect edges were debeveled with a #15 scalpel blade.  The primary defect was closed partially with a complex linear closure.  Given the location of the remaining defect, shape of the defect and the proximity to free margins a bilobe flap was deemed most appropriate for complete closure of the defect.  Using a sterile surgical marker, an appropriate advancement flap was drawn incorporating the defect and placing the expected incisions within the relaxed skin tension lines where possible.    The area thus outlined was incised deep to adipose tissue with a #15 scalpel blade.  The skin margins were undermined to an appropriate distance in all directions utilizing iris scissors.
Complex Repair And Melolabial Flap Text: The defect edges were debeveled with a #15 scalpel blade.  The primary defect was closed partially with a complex linear closure.  Given the location of the remaining defect, shape of the defect and the proximity to free margins a melolabial flap was deemed most appropriate for complete closure of the defect.  Using a sterile surgical marker, an appropriate advancement flap was drawn incorporating the defect and placing the expected incisions within the relaxed skin tension lines where possible.    The area thus outlined was incised deep to adipose tissue with a #15 scalpel blade.  The skin margins were undermined to an appropriate distance in all directions utilizing iris scissors.
Complex Repair And Rotation Flap Text: The defect edges were debeveled with a #15 scalpel blade.  The primary defect was closed partially with a complex linear closure.  Given the location of the remaining defect, shape of the defect and the proximity to free margins a rotation flap was deemed most appropriate for complete closure of the defect.  Using a sterile surgical marker, an appropriate advancement flap was drawn incorporating the defect and placing the expected incisions within the relaxed skin tension lines where possible.    The area thus outlined was incised deep to adipose tissue with a #15 scalpel blade.  The skin margins were undermined to an appropriate distance in all directions utilizing iris scissors.
Complex Repair And Rhombic Flap Text: The defect edges were debeveled with a #15 scalpel blade.  The primary defect was closed partially with a complex linear closure.  Given the location of the remaining defect, shape of the defect and the proximity to free margins a rhombic flap was deemed most appropriate for complete closure of the defect.  Using a sterile surgical marker, an appropriate advancement flap was drawn incorporating the defect and placing the expected incisions within the relaxed skin tension lines where possible.    The area thus outlined was incised deep to adipose tissue with a #15 scalpel blade.  The skin margins were undermined to an appropriate distance in all directions utilizing iris scissors.
Complex Repair And Transposition Flap Text: The defect edges were debeveled with a #15 scalpel blade.  The primary defect was closed partially with a complex linear closure.  Given the location of the remaining defect, shape of the defect and the proximity to free margins a transposition flap was deemed most appropriate for complete closure of the defect.  Using a sterile surgical marker, an appropriate advancement flap was drawn incorporating the defect and placing the expected incisions within the relaxed skin tension lines where possible.    The area thus outlined was incised deep to adipose tissue with a #15 scalpel blade.  The skin margins were undermined to an appropriate distance in all directions utilizing iris scissors.
Complex Repair And V-Y Plasty Text: The defect edges were debeveled with a #15 scalpel blade.  The primary defect was closed partially with a complex linear closure.  Given the location of the remaining defect, shape of the defect and the proximity to free margins a V-Y plasty was deemed most appropriate for complete closure of the defect.  Using a sterile surgical marker, an appropriate advancement flap was drawn incorporating the defect and placing the expected incisions within the relaxed skin tension lines where possible.    The area thus outlined was incised deep to adipose tissue with a #15 scalpel blade.  The skin margins were undermined to an appropriate distance in all directions utilizing iris scissors.
Complex Repair And M Plasty Text: The defect edges were debeveled with a #15 scalpel blade.  The primary defect was closed partially with a complex linear closure.  Given the location of the remaining defect, shape of the defect and the proximity to free margins an M plasty was deemed most appropriate for complete closure of the defect.  Using a sterile surgical marker, an appropriate advancement flap was drawn incorporating the defect and placing the expected incisions within the relaxed skin tension lines where possible.    The area thus outlined was incised deep to adipose tissue with a #15 scalpel blade.  The skin margins were undermined to an appropriate distance in all directions utilizing iris scissors.
Complex Repair And Double M Plasty Text: The defect edges were debeveled with a #15 scalpel blade.  The primary defect was closed partially with a complex linear closure.  Given the location of the remaining defect, shape of the defect and the proximity to free margins a double M plasty was deemed most appropriate for complete closure of the defect.  Using a sterile surgical marker, an appropriate advancement flap was drawn incorporating the defect and placing the expected incisions within the relaxed skin tension lines where possible.    The area thus outlined was incised deep to adipose tissue with a #15 scalpel blade.  The skin margins were undermined to an appropriate distance in all directions utilizing iris scissors.
Complex Repair And W Plasty Text: The defect edges were debeveled with a #15 scalpel blade.  The primary defect was closed partially with a complex linear closure.  Given the location of the remaining defect, shape of the defect and the proximity to free margins a W plasty was deemed most appropriate for complete closure of the defect.  Using a sterile surgical marker, an appropriate advancement flap was drawn incorporating the defect and placing the expected incisions within the relaxed skin tension lines where possible.    The area thus outlined was incised deep to adipose tissue with a #15 scalpel blade.  The skin margins were undermined to an appropriate distance in all directions utilizing iris scissors.
Complex Repair And Z Plasty Text: The defect edges were debeveled with a #15 scalpel blade.  The primary defect was closed partially with a complex linear closure.  Given the location of the remaining defect, shape of the defect and the proximity to free margins a Z plasty was deemed most appropriate for complete closure of the defect.  Using a sterile surgical marker, an appropriate advancement flap was drawn incorporating the defect and placing the expected incisions within the relaxed skin tension lines where possible.    The area thus outlined was incised deep to adipose tissue with a #15 scalpel blade.  The skin margins were undermined to an appropriate distance in all directions utilizing iris scissors.
Complex Repair And Dorsal Nasal Flap Text: The defect edges were debeveled with a #15 scalpel blade.  The primary defect was closed partially with a complex linear closure.  Given the location of the remaining defect, shape of the defect and the proximity to free margins a dorsal nasal flap was deemed most appropriate for complete closure of the defect.  Using a sterile surgical marker, an appropriate flap was drawn incorporating the defect and placing the expected incisions within the relaxed skin tension lines where possible.    The area thus outlined was incised deep to adipose tissue with a #15 scalpel blade.  The skin margins were undermined to an appropriate distance in all directions utilizing iris scissors.
Complex Repair And Ftsg Text: The defect edges were debeveled with a #15 scalpel blade.  The primary defect was closed partially with a complex linear closure.  Given the location of the defect, shape of the defect and the proximity to free margins a full thickness skin graft was deemed most appropriate to repair the remaining defect.  The graft was trimmed to fit the size of the remaining defect.  The graft was then placed in the primary defect, oriented appropriately, and sutured into place.
Complex Repair And Burow's Graft Text: The defect edges were debeveled with a #15 scalpel blade.  The primary defect was closed partially with a complex linear closure.  Given the location of the defect, shape of the defect, the proximity to free margins and the presence of a standing cone deformity a Burow's graft was deemed most appropriate to repair the remaining defect.  The graft was trimmed to fit the size of the remaining defect.  The graft was then placed in the primary defect, oriented appropriately, and sutured into place.
Complex Repair And Split-Thickness Skin Graft Text: The defect edges were debeveled with a #15 scalpel blade.  The primary defect was closed partially with a complex linear closure.  Given the location of the defect, shape of the defect and the proximity to free margins a split thickness skin graft was deemed most appropriate to repair the remaining defect.  The graft was trimmed to fit the size of the remaining defect.  The graft was then placed in the primary defect, oriented appropriately, and sutured into place.
Complex Repair And Epidermal Autograft Text: The defect edges were debeveled with a #15 scalpel blade.  The primary defect was closed partially with a complex linear closure.  Given the location of the defect, shape of the defect and the proximity to free margins an epidermal autograft was deemed most appropriate to repair the remaining defect.  The graft was trimmed to fit the size of the remaining defect.  The graft was then placed in the primary defect, oriented appropriately, and sutured into place.
Complex Repair And Dermal Autograft Text: The defect edges were debeveled with a #15 scalpel blade.  The primary defect was closed partially with a complex linear closure.  Given the location of the defect, shape of the defect and the proximity to free margins an dermal autograft was deemed most appropriate to repair the remaining defect.  The graft was trimmed to fit the size of the remaining defect.  The graft was then placed in the primary defect, oriented appropriately, and sutured into place.
Complex Repair And Tissue Cultured Epidermal Autograft Text: The defect edges were debeveled with a #15 scalpel blade.  The primary defect was closed partially with a complex linear closure.  Given the location of the defect, shape of the defect and the proximity to free margins an tissue cultured epidermal autograft was deemed most appropriate to repair the remaining defect.  The graft was trimmed to fit the size of the remaining defect.  The graft was then placed in the primary defect, oriented appropriately, and sutured into place.
Complex Repair And Xenograft Text: The defect edges were debeveled with a #15 scalpel blade.  The primary defect was closed partially with a complex linear closure.  Given the location of the defect, shape of the defect and the proximity to free margins a xenograft was deemed most appropriate to repair the remaining defect.  The graft was trimmed to fit the size of the remaining defect.  The graft was then placed in the primary defect, oriented appropriately, and sutured into place.
Complex Repair And Skin Substitute Graft Text: The defect edges were debeveled with a #15 scalpel blade.  The primary defect was closed partially with a complex linear closure.  Given the location of the remaining defect, shape of the defect and the proximity to free margins a skin substitute graft was deemed most appropriate to repair the remaining defect.  The graft was trimmed to fit the size of the remaining defect.  The graft was then placed in the primary defect, oriented appropriately, and sutured into place.
Path Notes (To The Dermatopathologist): Please check margins.
Consent was obtained from the patient. The risks and benefits to therapy were discussed in detail. Specifically, the risks of infection, scarring, bleeding, prolonged wound healing, incomplete removal, allergy to anesthesia, nerve injury and recurrence were addressed. Prior to the procedure, the treatment site was clearly identified and confirmed by the patient. All components of Universal Protocol/PAUSE Rule completed.
Post-Care Instructions: I reviewed with the patient in detail post-care instructions:\\n1. Apply bacitracin over the steri-strips.  \\n2. Cut non-stick pad (Telfa) to cover the steri-strips\\n3. Apply tape (hypafix) over the non-stick pad\\n4. Change once per day for 5 days\\n5. Shower with bandage on, change bandage after shower\\n\\nPatient is not to engage in any heavy lifting, exercise, hot tub, or swimming for the next 14 days. Should the patient develop any fevers, chills, bleeding, severe pain patient will contact the office immediately.
Home Suture Removal Text: Patient was provided a home suture removal kit and will remove their sutures at home.  If they have any questions or difficulties they will call the office.
Where Do You Want The Question To Include Opioid Counseling Located?: Case Summary Tab
Information: Selecting Yes will display possible errors in your note based on the variables you have selected. This validation is only offered as a suggestion for you. PLEASE NOTE THAT THE VALIDATION TEXT WILL BE REMOVED WHEN YOU FINALIZE YOUR NOTE. IF YOU WANT TO FAX A PRELIMINARY NOTE YOU WILL NEED TO TOGGLE THIS TO 'NO' IF YOU DO NOT WANT IT IN YOUR FAXED NOTE.

## 2020-12-29 NOTE — PROCEDURE: MIPS QUALITY
Detail Level: Detailed
Quality 130: Documentation Of Current Medications In The Medical Record: Current Medications Documented
Quality 265: Biopsy Follow-Up: Biopsy results reviewed, communicated, tracked, and documented
Quality 226: Preventive Care And Screening: Tobacco Use: Screening And Cessation Intervention: Patient screened for tobacco use and is an ex/non-smoker
Quality 111:Pneumonia Vaccination Status For Older Adults: Pneumococcal Vaccination Previously Received

## 2020-12-30 ENCOUNTER — PATIENT MESSAGE (OUTPATIENT)
Dept: MEDICAL GROUP | Facility: PHYSICIAN GROUP | Age: 85
End: 2020-12-30

## 2020-12-30 DIAGNOSIS — B35.1 ONYCHOMYCOSIS: ICD-10-CM

## 2021-01-04 NOTE — TELEPHONE ENCOUNTER
From: Marry Mathur  To: CRISTINO Horner  Sent: 12/30/2020 10:33 AM PST  Subject: Non-Urgent Medical Question    I completed one month of Terbinafine 250 mg tablets. I would like another copy of the lab work that you  requested so I make a reservation for the lab work.   Sorry to inconvenience you.    Marry Mathur

## 2021-01-06 ENCOUNTER — HOSPITAL ENCOUNTER (OUTPATIENT)
Dept: LAB | Facility: MEDICAL CENTER | Age: 86
End: 2021-01-06
Attending: NURSE PRACTITIONER
Payer: MEDICARE

## 2021-01-06 DIAGNOSIS — B35.1 ONYCHOMYCOSIS: ICD-10-CM

## 2021-01-06 LAB
ALBUMIN SERPL BCP-MCNC: 4.3 G/DL (ref 3.2–4.9)
ALBUMIN/GLOB SERPL: 1.7 G/DL
ALP SERPL-CCNC: 46 U/L (ref 30–99)
ALT SERPL-CCNC: 21 U/L (ref 2–50)
ANION GAP SERPL CALC-SCNC: 7 MMOL/L (ref 7–16)
AST SERPL-CCNC: 19 U/L (ref 12–45)
BILIRUB SERPL-MCNC: 0.5 MG/DL (ref 0.1–1.5)
BUN SERPL-MCNC: 25 MG/DL (ref 8–22)
CALCIUM SERPL-MCNC: 8.9 MG/DL (ref 8.5–10.5)
CHLORIDE SERPL-SCNC: 102 MMOL/L (ref 96–112)
CO2 SERPL-SCNC: 27 MMOL/L (ref 20–33)
CREAT SERPL-MCNC: 0.86 MG/DL (ref 0.5–1.4)
GLOBULIN SER CALC-MCNC: 2.5 G/DL (ref 1.9–3.5)
GLUCOSE SERPL-MCNC: 97 MG/DL (ref 65–99)
POTASSIUM SERPL-SCNC: 4.9 MMOL/L (ref 3.6–5.5)
PROT SERPL-MCNC: 6.8 G/DL (ref 6–8.2)
SODIUM SERPL-SCNC: 136 MMOL/L (ref 135–145)

## 2021-01-06 PROCEDURE — 80053 COMPREHEN METABOLIC PANEL: CPT

## 2021-01-06 PROCEDURE — 36415 COLL VENOUS BLD VENIPUNCTURE: CPT

## 2021-01-11 DIAGNOSIS — Z23 NEED FOR VACCINATION: ICD-10-CM

## 2021-02-24 ENCOUNTER — PATIENT MESSAGE (OUTPATIENT)
Dept: MEDICAL GROUP | Facility: PHYSICIAN GROUP | Age: 86
End: 2021-02-24

## 2021-02-24 DIAGNOSIS — E78.5 HYPERLIPIDEMIA, UNSPECIFIED HYPERLIPIDEMIA TYPE: ICD-10-CM

## 2021-02-24 DIAGNOSIS — D68.59 PROTEIN S DEFICIENCY (HCC): ICD-10-CM

## 2021-02-24 DIAGNOSIS — I48.91 ATRIAL FIBRILLATION, UNSPECIFIED TYPE (HCC): ICD-10-CM

## 2021-02-24 RX ORDER — ATORVASTATIN CALCIUM 10 MG/1
10 TABLET, FILM COATED ORAL EVERY EVENING
Qty: 90 TABLET | Refills: 3 | Status: SHIPPED | OUTPATIENT
Start: 2021-02-24 | End: 2021-04-01 | Stop reason: SDUPTHER

## 2021-02-24 NOTE — PATIENT COMMUNICATION
Pt has changed Mail order service. Please send this request.    Received request via: Patient    Was the patient seen in the last year in this department? Yes  LOV 12/01/2020    Does the patient have an active prescription (recently filled or refills available) for medication(s) requested? No

## 2021-02-24 NOTE — PROGRESS NOTES
Requested Prescriptions     Signed Prescriptions Disp Refills   • atorvastatin (LIPITOR) 10 MG Tab 90 tablet 3     Sig: Take 1 tablet by mouth every evening.     Authorizing Provider: ORALIA MONTANO A.P.R.N.

## 2021-02-25 NOTE — TELEPHONE ENCOUNTER
Requested Prescriptions     Signed Prescriptions Disp Refills   • rivaroxaban (XARELTO) 20 MG Tab tablet 90 tablet 1     Sig: Take 1 tablet by mouth with dinner.     Authorizing Provider: SASKIA MONTANO     Refused Prescriptions Disp Refills   • LYRICA 100 MG Cap 180 capsule 0     Sig: Take 1 capsule by mouth 2 times a day for 90 days.     Refused By: SASKIA MONTANO     Reason for Refusal: Medication never prescribed for the patient.     Saskia Montano A.P.R.N.

## 2021-03-01 ENCOUNTER — APPOINTMENT (RX ONLY)
Dept: URBAN - METROPOLITAN AREA CLINIC 4 | Facility: CLINIC | Age: 86
Setting detail: DERMATOLOGY
End: 2021-03-01

## 2021-03-01 DIAGNOSIS — D22 MELANOCYTIC NEVI: ICD-10-CM

## 2021-03-01 DIAGNOSIS — L57.0 ACTINIC KERATOSIS: ICD-10-CM

## 2021-03-01 DIAGNOSIS — L82.0 INFLAMED SEBORRHEIC KERATOSIS: ICD-10-CM

## 2021-03-01 DIAGNOSIS — D18.0 HEMANGIOMA: ICD-10-CM

## 2021-03-01 DIAGNOSIS — L82.1 OTHER SEBORRHEIC KERATOSIS: ICD-10-CM

## 2021-03-01 DIAGNOSIS — L81.4 OTHER MELANIN HYPERPIGMENTATION: ICD-10-CM

## 2021-03-01 DIAGNOSIS — Z85.820 PERSONAL HISTORY OF MALIGNANT MELANOMA OF SKIN: ICD-10-CM

## 2021-03-01 DIAGNOSIS — L57.8 OTHER SKIN CHANGES DUE TO CHRONIC EXPOSURE TO NONIONIZING RADIATION: ICD-10-CM

## 2021-03-01 PROBLEM — D22.5 MELANOCYTIC NEVI OF TRUNK: Status: ACTIVE | Noted: 2021-03-01

## 2021-03-01 PROBLEM — D18.01 HEMANGIOMA OF SKIN AND SUBCUTANEOUS TISSUE: Status: ACTIVE | Noted: 2021-03-01

## 2021-03-01 PROCEDURE — 17110 DESTRUCTION B9 LES UP TO 14: CPT

## 2021-03-01 PROCEDURE — ? LIQUID NITROGEN

## 2021-03-01 PROCEDURE — ? COUNSELING

## 2021-03-01 PROCEDURE — 17003 DESTRUCT PREMALG LES 2-14: CPT | Mod: 59

## 2021-03-01 PROCEDURE — 17000 DESTRUCT PREMALG LESION: CPT | Mod: 59

## 2021-03-01 PROCEDURE — 99213 OFFICE O/P EST LOW 20 MIN: CPT | Mod: 25

## 2021-03-01 ASSESSMENT — LOCATION SIMPLE DESCRIPTION DERM
LOCATION SIMPLE: RIGHT FOREHEAD
LOCATION SIMPLE: LEFT HAND
LOCATION SIMPLE: RIGHT HAND
LOCATION SIMPLE: LEFT CHEEK
LOCATION SIMPLE: LEFT LOWER EXTREMITY
LOCATION SIMPLE: RIGHT UPPER EXTREMITY
LOCATION SIMPLE: RIGHT CHEEK
LOCATION SIMPLE: LEFT UPPER EXTREMITY
LOCATION SIMPLE: RIGHT LIP
LOCATION SIMPLE: NOSE
LOCATION SIMPLE: BACK
LOCATION SIMPLE: RIGHT NOSE
LOCATION SIMPLE: LEFT SHOULDER
LOCATION SIMPLE: RIGHT LOWER EXTREMITY

## 2021-03-01 ASSESSMENT — LOCATION DETAILED DESCRIPTION DERM
LOCATION DETAILED: RIGHT ANTERIOR THIGH
LOCATION DETAILED: LEFT DORSAL FOREARM
LOCATION DETAILED: RIGHT DORSAL HAND
LOCATION DETAILED: RIGHT MEDIAL FOREHEAD
LOCATION DETAILED: NASAL ROOT
LOCATION DETAILED: LEFT DORSAL HAND
LOCATION DETAILED: LEFT SUPERIOR LATERAL MALAR CHEEK
LOCATION DETAILED: RIGHT NASAL SIDEWALL
LOCATION DETAILED: LEFT ANTERIOR THIGH
LOCATION DETAILED: LEFT MID BACK
LOCATION DETAILED: LEFT POSTERIOR SHOULDER
LOCATION DETAILED: RIGHT UPPER BACK
LOCATION DETAILED: RIGHT DORSAL FOREARM
LOCATION DETAILED: RIGHT INFERIOR MEDIAL MALAR CHEEK
LOCATION DETAILED: RIGHT CHEEK
LOCATION DETAILED: LEFT UPPER BACK
LOCATION DETAILED: RIGHT LATERAL MALAR CHEEK
LOCATION DETAILED: LEFT CENTRAL MALAR CHEEK
LOCATION DETAILED: RIGHT LOWER CUTANEOUS LIP
LOCATION DETAILED: LEFT CHEEK

## 2021-03-01 ASSESSMENT — LOCATION ZONE DERM
LOCATION ZONE: ARM
LOCATION ZONE: HAND
LOCATION ZONE: NOSE
LOCATION ZONE: LEG
LOCATION ZONE: FACE
LOCATION ZONE: TRUNK
LOCATION ZONE: LIP

## 2021-03-01 NOTE — PROCEDURE: MIPS QUALITY
Quality 138: Melanoma: Coordination Of Care: A treatment plan was communicated to the physicians providing continuing care within one month of diagnosis outlining: diagnosis, tumor thickness and a plan for surgery or alternate care.
Quality 226: Preventive Care And Screening: Tobacco Use: Screening And Cessation Intervention: Patient screened for tobacco use and is an ex/non-smoker
Quality 111:Pneumonia Vaccination Status For Older Adults: Pneumococcal Vaccination Previously Received
Quality 130: Documentation Of Current Medications In The Medical Record: Current Medications Documented
Quality 137: Melanoma: Continuity Of Care - Recall System: Patient information entered into a recall system that includes: target date for the next exam specified AND a process to follow up with patients regarding missed or unscheduled appointments
Detail Level: Detailed

## 2021-03-01 NOTE — PROCEDURE: LIQUID NITROGEN
Render Note In Bullet Format When Appropriate: No
Consent: The patient's consent was obtained including but not limited to risks of crusting, scabbing, blistering, scarring, darker or lighter pigmentary change, recurrence, incomplete removal and infection.
Detail Level: Simple
Aperture Size (Optional): C
Number Of Freeze-Thaw Cycles: 2 freeze-thaw cycles
Post-Care Instructions: I reviewed with the patient in detail post-care instructions. Patient is to wear sunprotection, and avoid picking at any of the treated lesions. Pt may apply Vaseline to crusted or scabbing areas.
Duration Of Freeze Thaw-Cycle (Seconds): 3
Medical Necessity Clause: This procedure was medically necessary because the lesions that were treated were:
Number Of Freeze-Thaw Cycles: 1 freeze-thaw cycle
Medical Necessity Information: It is in your best interest to select a reason for this procedure from the list below. All of these items fulfill various CMS LCD requirements except the new and changing color options.
Detail Level: Detailed

## 2021-03-01 NOTE — TELEPHONE ENCOUNTER
1. Caller Name: Patient                                         Call Back Number: [phone] (home)       Patient approves a detailed voicemail message: yes    Patient informed to get off the Humira, she has an unopened box at home however and would to know if we could use it? She states pharmacy wont take it back either. Please advise.    Dr Guerra (rheumatologist ) [phone]

## 2021-03-03 ENCOUNTER — APPOINTMENT (OUTPATIENT)
Dept: MEDICAL GROUP | Facility: PHYSICIAN GROUP | Age: 86
End: 2021-03-03
Payer: MEDICARE

## 2021-03-03 ENCOUNTER — TELEPHONE (OUTPATIENT)
Dept: URGENT CARE | Facility: PHYSICIAN GROUP | Age: 86
End: 2021-03-03

## 2021-03-03 NOTE — TELEPHONE ENCOUNTER
Saskia - this patient is going to see you next week (she had an appt today but it had to be rescheduled for next week on 3/9/21 due to a miscommunication/error) she is wondering if she can get a refill on Terbinafine HCl 250 MG Oral Tablet (LAMISIL) because her fungus is not quite gone.  She says it has helped but did not get rid of it completely. Please message her in My Chart or call her. Thank you

## 2021-03-04 DIAGNOSIS — Z79.899 ENCOUNTER FOR MONITORING DENOSUMAB THERAPY: ICD-10-CM

## 2021-03-04 DIAGNOSIS — Z51.81 ENCOUNTER FOR MONITORING DENOSUMAB THERAPY: ICD-10-CM

## 2021-03-09 ENCOUNTER — OFFICE VISIT (OUTPATIENT)
Dept: MEDICAL GROUP | Facility: PHYSICIAN GROUP | Age: 86
End: 2021-03-09
Payer: MEDICARE

## 2021-03-09 VITALS
OXYGEN SATURATION: 97 % | WEIGHT: 169 LBS | RESPIRATION RATE: 16 BRPM | TEMPERATURE: 98.1 F | BODY MASS INDEX: 28.85 KG/M2 | DIASTOLIC BLOOD PRESSURE: 68 MMHG | SYSTOLIC BLOOD PRESSURE: 128 MMHG | HEIGHT: 64 IN | HEART RATE: 70 BPM

## 2021-03-09 DIAGNOSIS — B35.1 ONYCHOMYCOSIS: ICD-10-CM

## 2021-03-09 PROCEDURE — 99213 OFFICE O/P EST LOW 20 MIN: CPT | Performed by: NURSE PRACTITIONER

## 2021-03-09 RX ORDER — PREGABALIN 150 MG/1
CAPSULE ORAL
COMMUNITY
Start: 2021-01-20 | End: 2021-03-09

## 2021-03-09 ASSESSMENT — PATIENT HEALTH QUESTIONNAIRE - PHQ9: CLINICAL INTERPRETATION OF PHQ2 SCORE: 0

## 2021-03-09 ASSESSMENT — FIBROSIS 4 INDEX: FIB4 SCORE: 1.97

## 2021-03-09 NOTE — PROGRESS NOTES
"Subjective:     CC: nail fungus follow-up    HPI:   Marry presents today with the following:     Onychomycosis  Chronic medical problem. She is here for follow-up on her fingernail fungus. She has completed her terbinafine 3 month course. She states that her fingernails have improved and she has been clipping/filing the nails. She has not returned to the nail salon. Denies abdominal pain, nausea, vomiting, or jaundice. She had labs in January 2021.  Results for MARRY BYRD (MRN [id number]) as of 3/9/2021 08:12   Ref. Range 1/6/2021 11:56   AST(SGOT) Latest Ref Range: 12 - 45 U/L 19   ALT(SGPT) Latest Ref Range: 2 - 50 U/L 21   Alkaline Phosphatase Latest Ref Range: 30 - 99 U/L 46   Total Bilirubin Latest Ref Range: 0.1 - 1.5 mg/dL 0.5       Past Medical History:   Diagnosis Date   • Adenocarcinoma of colon (HCC) 10/30/2018    From sessile serrated polyp 10/2018   • Anesthesia     pt states \"one new dr scraped my esophagus when they put the tube in and I started bleeding so they couldn't operate\"    • Atrial fibrillation [I48.91] 4/19/2016     pt denies    • Back pain 6/27/2012   • Blood clotting disorder (HCC) 2012    clot in leg   • Bowel habit changes     constipation    • Cancer (HCC)     skin, colon 2018   • Cataract     belia IOL    • Chronic back pain greater than 3 months duration    • Deep vein thrombosis (HCC) 3/8/2016    First occurrence in [facility] in late 1970s Second occurrence further up in [facility] in 2012, has been on AC since    • Essential hypertension, benign 6/27/2012   • GERD (gastroesophageal reflux disease) 6/27/2012   • Heart murmur    • Hyperlipidemia    • Hypertension     hx of, not currently    • Impaired fasting glucose 11/2/2017   • Mild aortic stenosis 11/2/2017   • Other and unspecified hyperlipidemia 6/27/2012   • Prediabetes    • Protein S deficiency (HCC) 11/2/2017    Noted in lab work 2013 as a part of work up at [facility]    • Rheumatoid arthritis involving multiple sites with " positive rheumatoid factor (HCC) 03/14/2016    Dr. Escoto   • Rheumatoid nodule ([facility]) 7/25/2017   • Right bundle branch block 6/27/2012    pt denies    • Urinary incontinence 11/2/2017       Social History     Tobacco Use   • Smoking status: Never Smoker   • Smokeless tobacco: Never Used   Substance Use Topics   • Alcohol use: Not Currently     Alcohol/week: 0.0 oz     Comment: occ   • Drug use: No       Current Outpatient Medications Ordered in Epic   Medication Sig Dispense Refill   • rivaroxaban (XARELTO) 20 MG Tab tablet Take 1 tablet by mouth with dinner. 90 tablet 1   • atorvastatin (LIPITOR) 10 MG Tab Take 1 tablet by mouth every evening. 90 tablet 3   • hydroxychloroquine (PLAQUENIL) 200 MG Tab TAKE ONE BY MOUTH TWICE DAILY 180 Tab 0   • Potassium 99 MG Tab Take 99 mg by mouth every day.     • lidocaine (LIDODERM) 5 % Patch Apply 1 Patch to skin as directed every 24 hours. 90 Patch 0   • LYRICA 100 MG Cap Take 100 mg by mouth 2 times a day.     • omeprazole (PRILOSEC) 20 MG delayed-release capsule Take 20 mg by mouth every day.     • Diphenhydramine-APAP, sleep, (TYLENOL PM EXTRA STRENGTH)  MG Tab Take 2 Tabs by mouth every bedtime.     • acetaminophen (TYLENOL) 500 MG Tab Take 1,000 mg by mouth 2 Times a Day.     • Melatonin 10 MG Tab Take 1 Tab by mouth every bedtime.     • vitamin D (CHOLECALCIFEROL) 1000 UNIT Tab Take 1,000 Units by mouth every morning.     • Calcium Carbonate-Vitamin D (CALCIUM + D PO) Take 1 Tab by mouth every bedtime.     • terbinafine (LAMISIL) 250 MG Tab Take 1 Tab by mouth every day. (Patient not taking: Reported on 3/9/2021) 90 Tab 0     Current Facility-Administered Medications Ordered in Epic   Medication Dose Route Frequency Provider Last Rate Last Admin   • denosumab (PROLIA) subq injection 60 mg  60 mg Subcutaneous Q6 MO Deanna Escoto M.D.   60 mg at 12/14/20 1139       Allergies:  Sulfa drugs    Health Maintenance: Due for annual wellness visit, zoster vaccine  "and mammogram. Mammogram is scheduled 4/12/2021. She had second dose of COVID vaccine 2/25/2021.     ROS:  Gen: no fevers/chills, no changes in weight  Pulm: no sob, no cough  CV: no chest pain, no palpitations  GI: no nausea/vomiting, no abdominal pain  Skin: no rash  Neuro: no headaches    Objective:     Vital signs reviewed   Exam:  /68 (BP Location: Right arm, Patient Position: Sitting, BP Cuff Size: Adult)   Pulse 70   Temp 36.7 °C (98.1 °F) (Temporal)   Resp 16   Ht 1.613 m (5' 3.5\")   Wt 76.7 kg (169 lb)   SpO2 97%   BMI 29.47 kg/m²  Body mass index is 29.47 kg/m².    Gen: Alert and oriented, No apparent distress.  Eyes:   Lids normal. Glasses in place.   Neck: Neck is supple without lymphadenopathy.  Lungs: Normal effort, CTA bilaterally, no wheezes, rhonchi, or rales  CV: Regular rate and rhythm with murmur. No rubs or gallops.  Ext: No clubbing, cyanosis, edema.  Hands:  Fingernails without yellow coloring, subungal debris, erythema, swelling, or drainage. Fingernails at nailbed growing out clean.       Assessment & Plan:     85 y.o. female with the following -     1. Onychomycosis  Chronic stable problem. Terbinafine completed. Discussed continuing to let nail grow and clip/file as needed. Keep hands clean and dry. Labs reviewed. Follow-up as needed.       Return in about 5 months (around 8/9/2021) for annual wellness visit .    Please note that this dictation was created using voice recognition software. I have made every reasonable attempt to correct obvious errors, but I expect that there are errors of grammar and possibly content that I did not discover before finalizing the note.      "

## 2021-03-09 NOTE — ASSESSMENT & PLAN NOTE
Chronic medical problem. She is here for follow-up on her fingernail fungus. She has completed her terbinafine 3 month course. She states that her fingernails have improved and she has been clipping/filing the nails. She has not returned to the nail salon. Denies abdominal pain, nausea, vomiting, or jaundice. She had labs in January 2021.  Results for SHAILESH BYRD (MRN [id number]) as of 3/9/2021 08:12   Ref. Range 1/6/2021 11:56   AST(SGOT) Latest Ref Range: 12 - 45 U/L 19   ALT(SGPT) Latest Ref Range: 2 - 50 U/L 21   Alkaline Phosphatase Latest Ref Range: 30 - 99 U/L 46   Total Bilirubin Latest Ref Range: 0.1 - 1.5 mg/dL 0.5

## 2021-03-31 ENCOUNTER — PATIENT MESSAGE (OUTPATIENT)
Dept: MEDICAL GROUP | Facility: PHYSICIAN GROUP | Age: 86
End: 2021-03-31

## 2021-03-31 DIAGNOSIS — D68.59 PROTEIN S DEFICIENCY (HCC): ICD-10-CM

## 2021-03-31 DIAGNOSIS — I48.91 ATRIAL FIBRILLATION, UNSPECIFIED TYPE (HCC): ICD-10-CM

## 2021-03-31 NOTE — PATIENT COMMUNICATION
Received request via: Patient    Was the patient seen in the last year in this department? Yes on 3/9/2021    Does the patient have an active prescription (recently filled or refills available) for medication(s) requested? No

## 2021-03-31 NOTE — PROGRESS NOTES
Requested Prescriptions     Signed Prescriptions Disp Refills   • rivaroxaban (XARELTO) 20 MG Tab tablet 90 tablet 1     Sig: Take 1 tablet by mouth with dinner.     Authorizing Provider: ORALIA MONTANO A.P.R.N.

## 2021-04-01 DIAGNOSIS — I48.91 ATRIAL FIBRILLATION, UNSPECIFIED TYPE (HCC): ICD-10-CM

## 2021-04-01 DIAGNOSIS — D68.59 PROTEIN S DEFICIENCY (HCC): ICD-10-CM

## 2021-04-01 DIAGNOSIS — E78.5 HYPERLIPIDEMIA, UNSPECIFIED HYPERLIPIDEMIA TYPE: ICD-10-CM

## 2021-04-01 RX ORDER — ATORVASTATIN CALCIUM 10 MG/1
10 TABLET, FILM COATED ORAL EVERY EVENING
Qty: 90 TABLET | Refills: 3 | Status: SHIPPED | OUTPATIENT
Start: 2021-04-01 | End: 2022-06-06 | Stop reason: SDUPTHER

## 2021-04-01 NOTE — TELEPHONE ENCOUNTER
Requested Prescriptions     Signed Prescriptions Disp Refills   • atorvastatin (LIPITOR) 10 MG Tab 90 tablet 3     Sig: Take 1 tablet by mouth every evening.     Authorizing Provider: ORALIA MONTANO   • rivaroxaban (XARELTO) 20 MG Tab tablet 90 tablet 1     Sig: Take 1 tablet by mouth with dinner.     Authorizing Provider: ORALIA MONTANO A.P.R.N.

## 2021-04-01 NOTE — TELEPHONE ENCOUNTER
Pt called stated that current Mail Order [facility] has become to expensive especially with Xarelto Pt has requested getting all medication sent to [facility]. Chart has been updated. Please send requested Rx's to new Mail order pharmacy    Received request via: Patient     Was the patient seen in the last year in this department? Yes    Does the patient have an active prescription (recently filled or refills available) for medication(s) requested? No

## 2021-04-02 ENCOUNTER — TELEPHONE (OUTPATIENT)
Dept: VASCULAR LAB | Facility: MEDICAL CENTER | Age: 86
End: 2021-04-02

## 2021-04-02 RX ORDER — RIVAROXABAN 20 MG/1
TABLET, FILM COATED ORAL
Qty: 90 TABLET | Refills: 1 | OUTPATIENT
Start: 2021-04-02

## 2021-04-02 NOTE — TELEPHONE ENCOUNTER
[facility]      Called and left msg for pt to call back to establish care regarding anticoagulation referral for Xarelto due to Hx of AF  from Saskia LESTER on 4/1/21          Insurance: Medicare  PCP: Gregorio = Saskia LESTER  Locations to be seen: any     Phone number left for return call or any questions or concerns.  [facility] at [phone], fax [phone]      Tom Pa PharmD      _________________________________________________________________    Spoke with the patient on the phone today, and she reported that she is already an established patient with the [facility], and the new referral was likely placed to renew services with our clinic.   Her next follow up in clinic is in June. Patient appreciative of call.         Tom Pa PharmD

## 2021-04-08 ENCOUNTER — HOSPITAL ENCOUNTER (OUTPATIENT)
Dept: CARDIOLOGY | Facility: MEDICAL CENTER | Age: 86
End: 2021-04-08
Attending: INTERNAL MEDICINE
Payer: MEDICARE

## 2021-04-08 DIAGNOSIS — I35.0 AORTIC VALVE STENOSIS, ETIOLOGY OF CARDIAC VALVE DISEASE UNSPECIFIED: ICD-10-CM

## 2021-04-08 DIAGNOSIS — I48.0 AF (PAROXYSMAL ATRIAL FIBRILLATION) (HCC): ICD-10-CM

## 2021-04-08 PROCEDURE — 93306 TTE W/DOPPLER COMPLETE: CPT

## 2021-04-09 ENCOUNTER — HOSPITAL ENCOUNTER (OUTPATIENT)
Dept: LAB | Facility: MEDICAL CENTER | Age: 86
End: 2021-04-09
Attending: INTERNAL MEDICINE
Payer: MEDICARE

## 2021-04-09 LAB
ALBUMIN SERPL BCP-MCNC: 4.2 G/DL (ref 3.2–4.9)
ALBUMIN/GLOB SERPL: 1.6 G/DL
ALP SERPL-CCNC: 47 U/L (ref 30–99)
ALT SERPL-CCNC: 18 U/L (ref 2–50)
ANION GAP SERPL CALC-SCNC: 8 MMOL/L (ref 7–16)
AST SERPL-CCNC: 20 U/L (ref 12–45)
BASOPHILS # BLD AUTO: 1.1 % (ref 0–1.8)
BASOPHILS # BLD: 0.05 K/UL (ref 0–0.12)
BILIRUB SERPL-MCNC: 0.8 MG/DL (ref 0.1–1.5)
BUN SERPL-MCNC: 17 MG/DL (ref 8–22)
CALCIUM SERPL-MCNC: 9 MG/DL (ref 8.5–10.5)
CHLORIDE SERPL-SCNC: 105 MMOL/L (ref 96–112)
CHOLEST SERPL-MCNC: 143 MG/DL (ref 100–199)
CO2 SERPL-SCNC: 27 MMOL/L (ref 20–33)
CREAT SERPL-MCNC: 0.71 MG/DL (ref 0.5–1.4)
EOSINOPHIL # BLD AUTO: 0.15 K/UL (ref 0–0.51)
EOSINOPHIL NFR BLD: 3.3 % (ref 0–6.9)
ERYTHROCYTE [DISTWIDTH] IN BLOOD BY AUTOMATED COUNT: 47.8 FL (ref 35.9–50)
FASTING STATUS PATIENT QL REPORTED: NORMAL
GLOBULIN SER CALC-MCNC: 2.7 G/DL (ref 1.9–3.5)
GLUCOSE SERPL-MCNC: 80 MG/DL (ref 65–99)
HCT VFR BLD AUTO: 46.1 % (ref 37–47)
HDLC SERPL-MCNC: 59 MG/DL
HGB BLD-MCNC: 15 G/DL (ref 12–16)
IMM GRANULOCYTES # BLD AUTO: 0 K/UL (ref 0–0.11)
IMM GRANULOCYTES NFR BLD AUTO: 0 % (ref 0–0.9)
LDLC SERPL CALC-MCNC: 73 MG/DL
LV EJECT FRACT  99904: 65
LV EJECT FRACT MOD 2C 99903: 74.07
LV EJECT FRACT MOD 4C 99902: 79.98
LV EJECT FRACT MOD BP 99901: 77.57
LYMPHOCYTES # BLD AUTO: 1.63 K/UL (ref 1–4.8)
LYMPHOCYTES NFR BLD: 35.9 % (ref 22–41)
MCH RBC QN AUTO: 31.8 PG (ref 27–33)
MCHC RBC AUTO-ENTMCNC: 32.5 G/DL (ref 33.6–35)
MCV RBC AUTO: 97.9 FL (ref 81.4–97.8)
MONOCYTES # BLD AUTO: 0.43 K/UL (ref 0–0.85)
MONOCYTES NFR BLD AUTO: 9.5 % (ref 0–13.4)
NEUTROPHILS # BLD AUTO: 2.28 K/UL (ref 2–7.15)
NEUTROPHILS NFR BLD: 50.2 % (ref 44–72)
NRBC # BLD AUTO: 0 K/UL
NRBC BLD-RTO: 0 /100 WBC
PLATELET # BLD AUTO: 141 K/UL (ref 164–446)
PMV BLD AUTO: 12.5 FL (ref 9–12.9)
POTASSIUM SERPL-SCNC: 4.2 MMOL/L (ref 3.6–5.5)
PROT SERPL-MCNC: 6.9 G/DL (ref 6–8.2)
RBC # BLD AUTO: 4.71 M/UL (ref 4.2–5.4)
SODIUM SERPL-SCNC: 140 MMOL/L (ref 135–145)
TRIGL SERPL-MCNC: 53 MG/DL (ref 0–149)
WBC # BLD AUTO: 4.5 K/UL (ref 4.8–10.8)

## 2021-04-09 PROCEDURE — 80053 COMPREHEN METABOLIC PANEL: CPT

## 2021-04-09 PROCEDURE — 93306 TTE W/DOPPLER COMPLETE: CPT | Mod: 26 | Performed by: INTERNAL MEDICINE

## 2021-04-09 PROCEDURE — 85025 COMPLETE CBC W/AUTO DIFF WBC: CPT

## 2021-04-09 PROCEDURE — 80061 LIPID PANEL: CPT | Mod: GA

## 2021-04-09 PROCEDURE — 36415 COLL VENOUS BLD VENIPUNCTURE: CPT

## 2021-04-12 ENCOUNTER — HOSPITAL ENCOUNTER (OUTPATIENT)
Dept: RADIOLOGY | Facility: MEDICAL CENTER | Age: 86
End: 2021-04-12
Attending: NURSE PRACTITIONER
Payer: MEDICARE

## 2021-04-12 ENCOUNTER — OFFICE VISIT (OUTPATIENT)
Dept: MEDICAL GROUP | Facility: PHYSICIAN GROUP | Age: 86
End: 2021-04-12
Payer: MEDICARE

## 2021-04-12 VITALS
HEART RATE: 62 BPM | HEIGHT: 64 IN | SYSTOLIC BLOOD PRESSURE: 130 MMHG | BODY MASS INDEX: 29.02 KG/M2 | WEIGHT: 170 LBS | RESPIRATION RATE: 16 BRPM | DIASTOLIC BLOOD PRESSURE: 74 MMHG | TEMPERATURE: 97.2 F | OXYGEN SATURATION: 97 %

## 2021-04-12 DIAGNOSIS — I48.91 ATRIAL FIBRILLATION, UNSPECIFIED TYPE (HCC): Chronic | ICD-10-CM

## 2021-04-12 DIAGNOSIS — Z12.31 VISIT FOR SCREENING MAMMOGRAM: ICD-10-CM

## 2021-04-12 DIAGNOSIS — S89.91XA INJURY OF RIGHT LOWER EXTREMITY, INITIAL ENCOUNTER: ICD-10-CM

## 2021-04-12 DIAGNOSIS — I10 ESSENTIAL HYPERTENSION, BENIGN: ICD-10-CM

## 2021-04-12 PROCEDURE — 93971 EXTREMITY STUDY: CPT | Mod: RT

## 2021-04-12 PROCEDURE — 77063 BREAST TOMOSYNTHESIS BI: CPT

## 2021-04-12 PROCEDURE — 99214 OFFICE O/P EST MOD 30 MIN: CPT | Performed by: NURSE PRACTITIONER

## 2021-04-12 PROCEDURE — 73590 X-RAY EXAM OF LOWER LEG: CPT | Mod: RT

## 2021-04-12 RX ORDER — PREGABALIN 150 MG/1
150 CAPSULE ORAL DAILY
COMMUNITY
End: 2022-02-23

## 2021-04-12 ASSESSMENT — FIBROSIS 4 INDEX: FIB4 SCORE: 2.81

## 2021-04-12 NOTE — ASSESSMENT & PLAN NOTE
Chronic medical problem.  Her blood pressure today is 130/74.  She does not take any medication.  She denies any chest pain, shortness of breath, dizziness, or palpitations.

## 2021-04-12 NOTE — ASSESSMENT & PLAN NOTE
Chronic medical problem.  She is on Xarelto 20 mg with evening meal.  Her heart rate today is 62 and her blood pressure is 130/74.  She denies any chest pain, shortness of breath, dizziness, or palpitations.

## 2021-04-12 NOTE — PROGRESS NOTES
"Right leg injury subjective:     CC: Right leg injury    HPI:   Marry presents today with the following:    Right leg injury  Acute medical problem. 3 days ago she was unloading bags from her golf cart into her car.  The golf cart accelerated became jammed.  She was standing between her car and the golf cart. Her right lower leg became trapped between the golf cart and her car.  She has right leg hematoma with ecchymosis.  She states the hematoma has not changed in size. She has been icing the area. She is taking tylenol for pain. She is on Xarelto.  She denies any chest pain, shortness of breath, difficulty breathing, right calf pain, or leg swelling.    Atrial fibrillation [I48.91]  Chronic medical problem.  She is on Xarelto 20 mg with evening meal.  Her heart rate today is 62 and her blood pressure is 130/74.  She denies any chest pain, shortness of breath, dizziness, or palpitations.    Essential hypertension, benign  Chronic medical problem.  Her blood pressure today is 130/74.  She does not take any medication.  She denies any chest pain, shortness of breath, dizziness, or palpitations.      Past Medical History:   Diagnosis Date   • Adenocarcinoma of colon (HCC) 10/30/2018    From sessile serrated polyp 10/2018   • Anesthesia     pt states \"one new dr scraped my esophagus when they put the tube in and I started bleeding so they couldn't operate\"    • Atrial fibrillation [I48.91] 4/19/2016     pt denies    • Back pain 6/27/2012   • Blood clotting disorder (HCC) 2012    clot in leg   • Bowel habit changes     constipation    • Cancer (HCC)     skin, colon 2018   • Cataract     belia IOL    • Chronic back pain greater than 3 months duration    • Deep vein thrombosis (HCC) 3/8/2016    First occurrence in [facility] in late 1970s Second occurrence further up in [facility] in 2012, has been on AC since    • Essential hypertension, benign 6/27/2012   • GERD (gastroesophageal reflux disease) 6/27/2012   • Heart murmur    • " Hyperlipidemia    • Hypertension     hx of, not currently    • Impaired fasting glucose 11/2/2017   • Mild aortic stenosis 11/2/2017   • Other and unspecified hyperlipidemia 6/27/2012   • Prediabetes    • Protein S deficiency ([facility]) 11/2/2017    Noted in lab work 2013 as a part of work up at [facility]    • Rheumatoid arthritis involving multiple sites with positive rheumatoid factor ([facility]) 03/14/2016    Dr. Escoto   • Rheumatoid nodule ([facility]) 7/25/2017   • Right bundle branch block 6/27/2012    pt denies    • Urinary incontinence 11/2/2017       Social History     Tobacco Use   • Smoking status: Never Smoker   • Smokeless tobacco: Never Used   Substance Use Topics   • Alcohol use: Not Currently     Alcohol/week: 0.0 oz     Comment: occ   • Drug use: No       Current Outpatient Medications Ordered in Epic   Medication Sig Dispense Refill   • hydroxychloroquine (PLAQUENIL) 200 MG Tab TAKE ONE BY MOUTH TWICE DAILY 180 tablet 0   • lidocaine (LIDODERM) 5 % Patch APPLY ONE PATCH TO CLEAN DRY SKIN AS DIRECTED DAILY 90 Patch 1   • atorvastatin (LIPITOR) 10 MG Tab Take 1 tablet by mouth every evening. 90 tablet 3   • rivaroxaban (XARELTO) 20 MG Tab tablet Take 1 tablet by mouth with dinner. 90 tablet 1   • Potassium 99 MG Tab Take 99 mg by mouth every day.     • terbinafine (LAMISIL) 250 MG Tab Take 1 Tab by mouth every day. 90 Tab 0   • omeprazole (PRILOSEC) 20 MG delayed-release capsule Take 20 mg by mouth every day.     • Diphenhydramine-APAP, sleep, (TYLENOL PM EXTRA STRENGTH)  MG Tab Take 2 Tabs by mouth every bedtime.     • acetaminophen (TYLENOL) 500 MG Tab Take 1,000 mg by mouth 2 Times a Day.     • Melatonin 10 MG Tab Take 1 Tab by mouth every bedtime.     • vitamin D (CHOLECALCIFEROL) 1000 UNIT Tab Take 1,000 Units by mouth every morning.     • Calcium Carbonate-Vitamin D (CALCIUM + D PO) Take 1 Tab by mouth every bedtime.     • pregabalin (LYRICA) 150 MG Cap        Current Facility-Administered  "Medications Ordered in Epic   Medication Dose Route Frequency Provider Last Rate Last Admin   • denosumab (PROLIA) subq injection 60 mg  60 mg Subcutaneous Q6 MO Deanna Escoto M.D.   60 mg at 12/14/20 1139       Allergies:  Sulfa drugs    Health Maintenance: Deferred    ROS:  Per HPI above    Objective:     Vital signs reviewed  Exam:  /74 (BP Location: Left arm, Patient Position: Sitting, BP Cuff Size: Adult)   Pulse 62   Temp 36.2 °C (97.2 °F) (Temporal)   Resp 16   Ht 1.626 m (5' 4\")   Wt 77.1 kg (170 lb)   SpO2 97%   BMI 29.18 kg/m²  Body mass index is 29.18 kg/m².    Gen: Alert and oriented, No apparent distress.  Eyes:   Lids normal. Glasses in place.   Neck: Neck is supple without lymphadenopathy.  Lungs: Normal effort, CTA bilaterally, no wheezes, rhonchi, or rales  CV: Irregular rate and rhythm with systolic murmur.  Bilateral radial pulses 2+.  No rubs or gallops.  Ext: Right lower extremity with large hematoma to right medial tibia with ecchymosis.  Tender to palpation.  Right pedal pulse 2+, capillary refill less than 3 seconds.      Assessment & Plan:     85 y.o. female with the following -     1. Injury of right lower extremity, initial encounter  Acute complicated problem.  Will check x-ray rule out any fractures.  Will check ultrasound rule out any DVT or superficial thrombosis.  Discussed she can continue with Tylenol.  Discussed she may use compression wrap loosely to help.  Discussed that we will reach out to anticoagulation clinic to discuss Xarelto dose, she verbalized understanding.  Red flags discussed with strict ER precautions, she verbalized understanding.  - DX-TIBIA AND FIBULA RIGHT; Future  - US-EXTREMITY VENOUS LOWER UNILAT RIGHT; Future    2. Atrial fibrillation, unspecified type (HCC)  Chronic stable problem.  Heart rate and blood pressure at goal.  No acute complaints today.  Continue Xarelto for now pending x-ray imaging and ultrasound.  Discussed with patient " that I will reach out to anticoagulation clinic and discuss Xarelto use.  She verbalized understanding.    3. Essential hypertension, benign  Chronic stable problem.  Blood pressures at goal.  She will continue with her Xarelto for now.  No acute complaints today.    Return if symptoms worsen or fail to improve.    Please note that this dictation was created using voice recognition software. I have made every reasonable attempt to correct obvious errors, but I expect that there are errors of grammar and possibly content that I did not discover before finalizing the note.

## 2021-04-12 NOTE — ASSESSMENT & PLAN NOTE
Acute medical problem. 3 days ago she was unloading bags from her golf cart into her car.  The golf cart accelerated became jammed.  She was standing between her car and the golf cart. Her right lower leg became trapped between the golf cart and her car.  She has right leg hematoma with ecchymosis.  She states the hematoma has not changed in size. She has been icing the area. She is taking tylenol for pain. She is on Xarelto.  She denies any chest pain, shortness of breath, difficulty breathing, right calf pain, or leg swelling.

## 2021-05-24 DIAGNOSIS — K21.9 GASTROESOPHAGEAL REFLUX DISEASE WITHOUT ESOPHAGITIS: ICD-10-CM

## 2021-05-24 DIAGNOSIS — C18.9 ADENOCARCINOMA OF COLON (HCC): ICD-10-CM

## 2021-05-24 DIAGNOSIS — Z79.899 LONG-TERM USE OF PLAQUENIL: ICD-10-CM

## 2021-05-24 RX ORDER — OMEPRAZOLE 20 MG/1
20 CAPSULE, DELAYED RELEASE ORAL DAILY
Qty: 90 CAPSULE | Refills: 1 | Status: SHIPPED | OUTPATIENT
Start: 2021-05-24 | End: 2021-12-30 | Stop reason: SDUPTHER

## 2021-05-24 NOTE — TELEPHONE ENCOUNTER
Received request via: Patient  April labs    Was the patient seen in the last year in this department? Yes    Does the patient have an active prescription (recently filled or refills available) for medication(s) requested? No

## 2021-05-24 NOTE — TELEPHONE ENCOUNTER
----- Message from Marry Mathur sent at 5/20/2021  2:07 PM PDT -----  Regarding: Prescription Question  Contact: [phone]  I was unable to refill Hydroxychloroquine Sulfate  200 mg on site and will need a new request from your office.  My pharmacy is [facility].     Thank you.  Marry Mathur

## 2021-05-25 NOTE — TELEPHONE ENCOUNTER
Requested Prescriptions     Signed Prescriptions Disp Refills   • omeprazole (PRILOSEC) 20 MG delayed-release capsule 90 capsule 1     Sig: Take 1 capsule by mouth every day.     Authorizing Provider: ORALIA MONTANO A.P.R.N.

## 2021-05-27 RX ORDER — HYDROXYCHLOROQUINE SULFATE 200 MG/1
200 TABLET, FILM COATED ORAL 2 TIMES DAILY
Qty: 180 TABLET | Refills: 1 | Status: SHIPPED | OUTPATIENT
Start: 2021-05-27 | End: 2022-01-14 | Stop reason: SDUPTHER

## 2021-06-07 ENCOUNTER — ANTICOAGULATION VISIT (OUTPATIENT)
Dept: MEDICAL GROUP | Facility: PHYSICIAN GROUP | Age: 86
End: 2021-06-07
Payer: MEDICARE

## 2021-06-07 DIAGNOSIS — Z79.01 LONG TERM (CURRENT) USE OF ANTICOAGULANTS: Primary | ICD-10-CM

## 2021-06-07 PROCEDURE — 99211 OFF/OP EST MAY X REQ PHY/QHP: CPT | Performed by: FAMILY MEDICINE

## 2021-06-07 NOTE — PROGRESS NOTES
Target end date:Indefinite      Indication: Stroke prophylaxis      Drug: Xarelto 20 mg      CHADsVASC = at least 4    Health Status Since Last Assessment   Patient denies any new relevant medical problems, ED visits or hospitalizations   Patient denies any embolic events (stroke/tia/systemic embolism)     Pt has upcoming oculoplastic procedure in July to which pt has provided a request to hold DOAC for 4 days before and 3 days following procedure. Discussed that this is not typical for holding DOACs and provided [facility] fax number so Dr Jung's office at [facility] may send clearance and further discuss.    Adherence with DOAC Therapy   Pt has No missed any doses in the average week  PT had a procedure a few weeks ago where she had to hold therapy but she is back on her regular regimen    Bleeding Risk Assessment     No Epistaxis   Pt denies any excessive or unusual bleeding/hematomas.  Pt denies any GI bleeds or hematemesis.  Pt denies any concerning daily headache or sub dural hematoma symptoms.     Pt denies any hematuria or abnormal vaginal bleeding.   Latest Hemoglobin 15.0   ETOH overuse No     Creatinine Clearance/Renal Function     Latest ClCr >50 Ml/min    Hepatic function   Latest LFTs WNL   Pt denies any history of liver dysfunction      Drug Interactions   Platelets: 141   ASA/other antiplatelets None   NSAID None   Other drug interactions None to note   x Verified no anticonvulsant or azole therapy, education provided for future use.     Examination   Blood Pressure 134/74    Symptomatic hypotension No   Significant gait impairment/imbalance/high risk for falls? No    Final Assessment and Recommendations:   Patient appears stable from the anticoagulation standpoint.     Benefits of continued DOAC therapy outweigh risks for this patient   Recommend pt continue with current DOAC therapy Xarelto 20 mg QD (with dose adjustment)     Other Actions: cmp/ cbc hemogram ordered prior to next  visit    Follow up:   Will follow up with patient 6  months.        Carmela Rand student pharmacist    Bebeto Sethi, PharmD, [facility]

## 2021-06-08 ENCOUNTER — HOSPITAL ENCOUNTER (OUTPATIENT)
Dept: LAB | Facility: MEDICAL CENTER | Age: 86
End: 2021-06-08
Attending: INTERNAL MEDICINE
Payer: MEDICARE

## 2021-06-08 DIAGNOSIS — Z79.899 ENCOUNTER FOR MONITORING DENOSUMAB THERAPY: ICD-10-CM

## 2021-06-08 DIAGNOSIS — Z51.81 ENCOUNTER FOR MONITORING DENOSUMAB THERAPY: ICD-10-CM

## 2021-06-08 LAB
ALBUMIN SERPL BCP-MCNC: 4.1 G/DL (ref 3.2–4.9)
ALBUMIN/GLOB SERPL: 1.6 G/DL
ALP SERPL-CCNC: 46 U/L (ref 30–99)
ALT SERPL-CCNC: 27 U/L (ref 2–50)
ANION GAP SERPL CALC-SCNC: 8 MMOL/L (ref 7–16)
AST SERPL-CCNC: 23 U/L (ref 12–45)
BILIRUB SERPL-MCNC: 0.5 MG/DL (ref 0.1–1.5)
BUN SERPL-MCNC: 20 MG/DL (ref 8–22)
CALCIUM SERPL-MCNC: 8.7 MG/DL (ref 8.5–10.5)
CHLORIDE SERPL-SCNC: 102 MMOL/L (ref 96–112)
CO2 SERPL-SCNC: 27 MMOL/L (ref 20–33)
CREAT SERPL-MCNC: 0.76 MG/DL (ref 0.5–1.4)
GLOBULIN SER CALC-MCNC: 2.5 G/DL (ref 1.9–3.5)
GLUCOSE SERPL-MCNC: 85 MG/DL (ref 65–99)
POTASSIUM SERPL-SCNC: 4.3 MMOL/L (ref 3.6–5.5)
PROT SERPL-MCNC: 6.6 G/DL (ref 6–8.2)
SODIUM SERPL-SCNC: 137 MMOL/L (ref 135–145)

## 2021-06-08 PROCEDURE — 36415 COLL VENOUS BLD VENIPUNCTURE: CPT

## 2021-06-08 PROCEDURE — 80053 COMPREHEN METABOLIC PANEL: CPT

## 2021-06-15 ENCOUNTER — APPOINTMENT (OUTPATIENT)
Dept: RHEUMATOLOGY | Facility: MEDICAL CENTER | Age: 86
End: 2021-06-15
Payer: MEDICARE

## 2021-06-21 ENCOUNTER — OFFICE VISIT (OUTPATIENT)
Dept: RHEUMATOLOGY | Facility: MEDICAL CENTER | Age: 86
End: 2021-06-21
Payer: MEDICARE

## 2021-06-21 VITALS
OXYGEN SATURATION: 94 % | RESPIRATION RATE: 14 BRPM | BODY MASS INDEX: 28.84 KG/M2 | DIASTOLIC BLOOD PRESSURE: 58 MMHG | SYSTOLIC BLOOD PRESSURE: 110 MMHG | HEART RATE: 94 BPM | TEMPERATURE: 97.4 F | WEIGHT: 168 LBS

## 2021-06-21 DIAGNOSIS — I10 ESSENTIAL HYPERTENSION, BENIGN: ICD-10-CM

## 2021-06-21 DIAGNOSIS — D68.59 PROTEIN S DEFICIENCY (HCC): ICD-10-CM

## 2021-06-21 DIAGNOSIS — Z79.899 ENCOUNTER FOR MONITORING DENOSUMAB THERAPY: ICD-10-CM

## 2021-06-21 DIAGNOSIS — Z51.81 ENCOUNTER FOR MONITORING DENOSUMAB THERAPY: ICD-10-CM

## 2021-06-21 DIAGNOSIS — M81.0 OSTEOPOROSIS WITHOUT CURRENT PATHOLOGICAL FRACTURE, UNSPECIFIED OSTEOPOROSIS TYPE: ICD-10-CM

## 2021-06-21 DIAGNOSIS — M15.9 PRIMARY OSTEOARTHRITIS INVOLVING MULTIPLE JOINTS: ICD-10-CM

## 2021-06-21 DIAGNOSIS — Z79.899 LONG-TERM USE OF PLAQUENIL: ICD-10-CM

## 2021-06-21 DIAGNOSIS — H35.30 MACULAR DEGENERATION OF BOTH EYES, UNSPECIFIED TYPE: ICD-10-CM

## 2021-06-21 DIAGNOSIS — G62.9 NEUROPATHY: ICD-10-CM

## 2021-06-21 DIAGNOSIS — Z79.01 CHRONIC ANTICOAGULATION: ICD-10-CM

## 2021-06-21 PROCEDURE — 96372 THER/PROPH/DIAG INJ SC/IM: CPT | Performed by: INTERNAL MEDICINE

## 2021-06-21 PROCEDURE — M1145 MFN DRUG ADD-ON, PER DOSE: HCPCS | Performed by: INTERNAL MEDICINE

## 2021-06-21 PROCEDURE — 99214 OFFICE O/P EST MOD 30 MIN: CPT | Mod: 25 | Performed by: INTERNAL MEDICINE

## 2021-06-21 RX ORDER — 0.9 % SODIUM CHLORIDE 0.9 %
10 VIAL (ML) INJECTION PRN
Status: CANCELLED | OUTPATIENT
Start: 2021-12-22

## 2021-06-21 RX ORDER — HEPARIN SODIUM (PORCINE) LOCK FLUSH IV SOLN 100 UNIT/ML 100 UNIT/ML
500 SOLUTION INTRAVENOUS PRN
Status: CANCELLED | OUTPATIENT
Start: 2021-12-22

## 2021-06-21 RX ORDER — METHYLPREDNISOLONE SODIUM SUCCINATE 125 MG/2ML
125 INJECTION, POWDER, LYOPHILIZED, FOR SOLUTION INTRAMUSCULAR; INTRAVENOUS PRN
Status: CANCELLED | OUTPATIENT
Start: 2021-12-22

## 2021-06-21 RX ORDER — 0.9 % SODIUM CHLORIDE 0.9 %
3 VIAL (ML) INJECTION PRN
Status: CANCELLED | OUTPATIENT
Start: 2021-12-22

## 2021-06-21 RX ORDER — SODIUM CHLORIDE 9 MG/ML
INJECTION, SOLUTION INTRAVENOUS CONTINUOUS
Status: CANCELLED | OUTPATIENT
Start: 2021-12-22

## 2021-06-21 RX ORDER — EPINEPHRINE 1 MG/ML(1)
0.5 AMPUL (ML) INJECTION PRN
Status: CANCELLED | OUTPATIENT
Start: 2021-12-22

## 2021-06-21 RX ORDER — 0.9 % SODIUM CHLORIDE 0.9 %
VIAL (ML) INJECTION PRN
Status: CANCELLED | OUTPATIENT
Start: 2021-12-22

## 2021-06-21 RX ORDER — DIPHENHYDRAMINE HYDROCHLORIDE 50 MG/ML
50 INJECTION INTRAMUSCULAR; INTRAVENOUS PRN
Status: CANCELLED | OUTPATIENT
Start: 2021-12-22

## 2021-06-21 RX ORDER — GABAPENTIN 100 MG/1
CAPSULE ORAL
Qty: 90 CAPSULE | Refills: 0 | Status: SHIPPED | OUTPATIENT
Start: 2021-06-21 | End: 2021-09-02

## 2021-06-21 ASSESSMENT — FIBROSIS 4 INDEX: FIB4 SCORE: 2.64

## 2021-06-21 NOTE — PROGRESS NOTES
Marry Mathur is a 85 y.o. female here for a provider visit for Prolia 60mg injection.    Reason for injection: Osteoporosis  Order in MAR?: Yes  Patient supplied?:No  Minimum interval has been met for this injection (per MAR order): Yes    Patient tolerated injection and no adverse effects were observed or reported: Yes    # of Administrations remaining in MAR: 1

## 2021-06-21 NOTE — PROGRESS NOTES
Chief Complaint- joint pain     Subjective:   Marry Mathur is a 85 y.o. female here today for follow up of rheumatological issues    This is a follow-up visit for this patient who is seen in this clinic for osteoarthritis.  Patient does have a remote history of rheumatoid arthritis in the past with negative serologies and negative x-rays.  Patient however is on chronic anticoagulation for protein S deficiency and history of DVT and so cannot take any NSAIDs.  Patient is currently on Plaquenil 200 mg p.o. twice daily with benefit.  Patient denies any side effects from the medication, denies any unexplained weight loss, denies any fevers of unknown etiology, denies any GI upset, denies any rashes, denies any new joint swelling, denies recurrent infections. Of note patient's last ophthalmology evaluation October 2020 with Dr. Savage    Patient's issue today is exacerbation of neuropathy bilateral lower extremities with left foot slightly worse than the right, this is an old problem secondary to a diagnosis of spinal stenosis.  Patient is intermittently followed by spine clinic Dr. Cooper at [facility]       Patient also has low back issues scheduled for facet joint injections through Dr. Cooper at [facility]     Other issues include the development of leg length discrepancy status post left CARLYLE with left leg longer than the right.  Patient just recently got shoes to help adjust her leg length.      Other issues include a finding of osteoporosis on patient's bone density scan from March 2018, patient was not able to tolerate Fosamax because of a history of Araya's esophagus, patient currently on Prolia 60 mg subcu every 6 months with benefit.        Additional comorbidities include diabetes for which patient  takes metformin, also with a diagnosis of spinal stenosis with intermittent weakness in her legs with progressive numbness and weakness in the left leg.  Patient  also with protein S deficiency with a history of DVT on chronic anticoagulation.       S/p NSAIDS-unable to take because of chronic anticoagulation     Left TKA   Left CARLYLE      S/p gabapentin -ineffective   S/p Remicade-stopped because of hx of melanoma about 1996 and has multiple other skin cancers  S/p Orencia-lost efficacy  S/p Humira-stopped because of recurrence of melanoma October 2017  S/p MTX-stopped because of development of skin cancer/melanoma October 2017  S/p NSAIDS-relatively contraindicated as patient is on chronic anticoagulation   S/p xeljanz-stopped because of the development of colonic adenocarcinoma  S/p fosamax-unable to tolerate-GI upset      G6PD 13.6 adequate 5/2019  DEXA 3/18/2016 T scores 1.1, -1.1   FRAX 3/18/2016 major osteoporotic fracture risk 14.3%, hip fracture risks 3.7%  DEXA 3/23/2018 T scores 0.2, -1.4  FRAX 3/23/2018 major osteoporotic fracture risk 19.3%, hip fracture risk 6.1%  DEXA 6/2/2020 T scores 0.7, -1.4  FRAX 6/2/2020 major osteoporotic fracture risk 17.2%, hip fracture risk 4.9%  Prolia started 6/2019  Echocardiogram 7/2012 [facility]  RF neg 3/2014 [facility]; RF neg 6/2014 [facility]; RF neg 6/2016  CCP neg 3/2014 [facility]; CCP neg 6/2014 [facility]; CCP neg 6/2016  Uric acid 4.7 3/2014 [facility];Uric acid 4.5 6/2016  Hep B neg 6/2016  Quantiferon Gold neg 6/2014 [facility]; Quantiferon Gold neg 6/2016  Hand x-rays 3/2016-indicate osteoarthritis  Feet x-rays 3/2016-indicate osteoarthritis     Corticosteroid Therapy Informed Consent signed 1/17/2019-copy given to patient              Current Outpatient Medications   Medication Sig Dispense Refill   • gabapentin (NEURONTIN) 100 MG Cap 1 tab po qhs 90 capsule 0   • hydroxychloroquine (PLAQUENIL) 200 MG Tab Take 1 tablet by mouth 2 times a day. 180 tablet 1   • omeprazole (PRILOSEC) 20 MG delayed-release capsule Take 1 capsule by mouth every day. 90 capsule 1   • pregabalin (LYRICA) 150 MG Cap      • lidocaine  (LIDODERM) 5 % Patch APPLY ONE PATCH TO CLEAN DRY SKIN AS DIRECTED DAILY 90 Patch 1   • atorvastatin (LIPITOR) 10 MG Tab Take 1 tablet by mouth every evening. 90 tablet 3   • rivaroxaban (XARELTO) 20 MG Tab tablet Take 1 tablet by mouth with dinner. 90 tablet 1   • Potassium 99 MG Tab Take 99 mg by mouth every day.     • terbinafine (LAMISIL) 250 MG Tab Take 1 Tab by mouth every day. 90 Tab 0   • Diphenhydramine-APAP, sleep, (TYLENOL PM EXTRA STRENGTH)  MG Tab Take 2 Tabs by mouth every bedtime.     • acetaminophen (TYLENOL) 500 MG Tab Take 1,000 mg by mouth 2 Times a Day.     • Melatonin 10 MG Tab Take 1 Tab by mouth every bedtime.     • vitamin D (CHOLECALCIFEROL) 1000 UNIT Tab Take 1,000 Units by mouth every morning.     • Calcium Carbonate-Vitamin D (CALCIUM + D PO) Take 1 Tab by mouth every bedtime.       Current Facility-Administered Medications   Medication Dose Route Frequency Provider Last Rate Last Admin   • denosumab (PROLIA) subq injection 60 mg  60 mg Subcutaneous Q6 MO Deanna Escoto M.D.   60 mg at 06/21/21 1432     She  has a past medical history of Adenocarcinoma of colon ([facility]) (10/30/2018), Anesthesia, Atrial fibrillation [I48.91] (4/19/2016), Back pain (6/27/2012), Blood clotting disorder ([facility]) (2012), Bowel habit changes, Cancer ([facility]), Cataract, Chronic back pain greater than 3 months duration, Deep vein thrombosis ([facility]) (3/8/2016), Essential hypertension, benign (6/27/2012), GERD (gastroesophageal reflux disease) (6/27/2012), Heart murmur, Hyperlipidemia, Hypertension, Impaired fasting glucose (11/2/2017), Mild aortic stenosis (11/2/2017), Other and unspecified hyperlipidemia (6/27/2012), Prediabetes, Protein S deficiency ([facility]) (11/2/2017), Rheumatoid arthritis involving multiple sites with positive rheumatoid factor ([facility]) (03/14/2016), Rheumatoid nodule ([facility]) (7/25/2017), Right bundle branch block (6/27/2012), and Urinary incontinence (11/2/2017).    ROS   Other than what is  mentioned in HPI or physical exam, there is no history of headaches, double vision or blurred vision. No temporal tenderness or jaw claudication. No trouble swallowing difficulties or sore throats.  No chest complaints including chest pain, cough or sputum production. No GI complaints including nausea, vomiting, change in bowel habits, or past peptic ulcer disease. No history of blood in the stools. No urinary complaints including dysuria or frequency. No history of alopecia, photosensitivity, oral ulcerations, Raynaud's phenomena.       Objective:     /58   Pulse 94   Temp 36.3 °C (97.4 °F) (Temporal)   Resp 14   Wt 76.2 kg (168 lb)   SpO2 94%  Body mass index is 28.84 kg/m².   Physical Exam:    Constitutional: Alert and oriented X3, patient is talkative with good eye contact.Skin: Warm, dry, good turgor, no rashes in visible areas.Eye: Equal, round and reactive, conjunctiva clear, lids normal EOM intactENMT: Lips without lesions, good dentition, no oropharyngeal ulcers, moist buccal mucosa, pinna without deformityNeck: Trachea midline, no masses, no thyromegaly.Lymph:  No cervical lymphadenopathy, no axillary lymphadenopathy, no supraclavicular lymphadenopathyRespiratory: Unlabored respiratory effort, lungs clear to auscultation, no wheezes, no ronchi.Cardiovascular: Normal S1, S2, no murmur, no edema.Abdomen: Soft, non-tender, no masses, no hepatosplenomegaly.Psych: Alert and oriented x3, normal affect and mood.Neuro: Cranial nerves 2-12 are grossly intact, no loss of sensation LEExt:no joint laxity noted in bilateral arms, no joint laxity noted in bilateral legs evidence of Heberden's nodes on most DIP joints bilateral hands, no swan-neck or boutonniere deformities, shoulders full range of motion, no synovitis, toes without crossover toes without splay toes    Lab Results   Component Value Date/Time    QNTTBGOLD Negative 06/29/2016 02:03 PM     Lab Results   Component Value Date/Time    HEPBCORIGM  Negative 06/29/2016 02:03 PM    HEPBSAG Negative 06/29/2016 02:03 PM     Lab Results   Component Value Date/Time    SODIUM 137 06/08/2021 02:51 PM    POTASSIUM 4.3 06/08/2021 02:51 PM    CHLORIDE 102 06/08/2021 02:51 PM    CO2 27 06/08/2021 02:51 PM    GLUCOSE 85 06/08/2021 02:51 PM    BUN 20 06/08/2021 02:51 PM    CREATININE 0.76 06/08/2021 02:51 PM      Lab Results   Component Value Date/Time    WBC 4.5 (L) 04/09/2021 06:58 AM    RBC 4.71 04/09/2021 06:58 AM    HEMOGLOBIN 15.0 04/09/2021 06:58 AM    HEMATOCRIT 46.1 04/09/2021 06:58 AM    MCV 97.9 (H) 04/09/2021 06:58 AM    MCH 31.8 04/09/2021 06:58 AM    MCHC 32.5 (L) 04/09/2021 06:58 AM    MPV 12.5 04/09/2021 06:58 AM    NEUTSPOLYS 50.20 04/09/2021 06:58 AM    LYMPHOCYTES 35.90 04/09/2021 06:58 AM    MONOCYTES 9.50 04/09/2021 06:58 AM    EOSINOPHILS 3.30 04/09/2021 06:58 AM    BASOPHILS 1.10 04/09/2021 06:58 AM      Lab Results   Component Value Date/Time    CALCIUM 8.7 06/08/2021 02:51 PM    ASTSGOT 23 06/08/2021 02:51 PM    ALTSGPT 27 06/08/2021 02:51 PM    ALKPHOSPHAT 46 06/08/2021 02:51 PM    TBILIRUBIN 0.5 06/08/2021 02:51 PM    ALBUMIN 4.1 06/08/2021 02:51 PM    TOTPROTEIN 6.6 06/08/2021 02:51 PM     Lab Results   Component Value Date/Time    URICACID 4.5 06/29/2016 02:03 PM    RHEUMFACTN <10 06/29/2016 02:03 PM    CCPANTIBODY 4 06/29/2016 02:03 PM     Lab Results   Component Value Date/Time    SEDRATEWES 11 07/07/2018 11:25 AM     Lab Results   Component Value Date/Time    HBA1C 5.5 05/16/2016 07:50 AM     Lab Results   Component Value Date/Time    G6PD 13.6 05/20/2019 10:01 AM     Lab Results   Component Value Date/Time    CPKTOTAL 61 06/29/2016 02:03 PM     Assessment and Plan:     1. Primary osteoarthritis involving multiple joints  Continue Plaquenil 200 mg p.o. twice daily, patient unable to take NSAIDs because of chronic anticoagulation  - Comp Metabolic Panel; Future  - CBC WITH DIFFERENTIAL; Future  - Sed Rate; Future    2. Long-term use of  Plaquenil  On Plaquenil 200 mg p.o. twice daily of note G6PD levels are adequate, patient needs monitoring labs every 6 months, next labs due October 2021, labs ordered for patient  Patient also needs ophthalmology evaluation every year, next ophthalmology evaluation due about October 2021.  - Comp Metabolic Panel; Future  - CBC WITH DIFFERENTIAL; Future  - Sed Rate; Future    3. Osteoporosis without current pathological fracture, unspecified osteoporosis type  Last DEXA June 2020 Next DEXA June 2021  Currently on Prolia 60 mg subcu every 6 months   continue calcium citrate 1200 mg by mouth daily and vitamin D about 2000 units by mouth daily and magnesium 200 mg by mouth daily  - Comp Metabolic Panel; Future  - CBC WITH DIFFERENTIAL; Future  - Sed Rate; Future    4. Encounter for monitoring denosumab therapy  Patient needs monitoring labs within 2 weeks of each Prolia shot i.e. CMP to check renal and calcium levels  - Comp Metabolic Panel; Future  - CBC WITH DIFFERENTIAL; Future  - Sed Rate; Future    5. Chronic anticoagulation  Secondary to protein S deficiency and history of DVT  This will impact with kind of medications we can use for this patient's arthritis, NSAIDs are contraindicated because of increased risk of bleeding while on chronic anticoagulation    6. Protein S deficiency (HCC)  With history of DVT currently on chronic anticoagulation    7. Essential hypertension, benign  May impact the type of medications we can use for this patient's arthritis. We will have to keep this under advisement.      8. Macular degeneration of both eyes, unspecified type  Closely followed by ophthalmology    9. Neuropathy  Of bilateral lower extremities left worse than right, patient already on Lyrica, patient had been on gabapentin in the past but said it did not work, patient interested in doing a retry will do a true trial of gabapentin 100 mg p.o. nightly for 3 months.  - gabapentin (NEURONTIN) 100 MG Cap; 1 tab po qhs   Dispense: 90 capsule; Refill: 0    Followup: Return in about 6 months (around 12/21/2021). or sooner jose eduardo Mathur  was seen 30 minutes face-to-face of which more than 50% of the time was spent counseling the patient (excluding time for procedures)  regarding  rheumatological condition and care. Therapy was discussed in detail.      Please note that this dictation was created using voice recognition software. I have made every reasonable attempt to correct obvious errors, but I expect that there are errors of grammar and possibly content that I did not discover before finalizing the note.

## 2021-06-30 ENCOUNTER — APPOINTMENT (RX ONLY)
Dept: URBAN - METROPOLITAN AREA CLINIC 4 | Facility: CLINIC | Age: 86
Setting detail: DERMATOLOGY
End: 2021-06-30

## 2021-06-30 DIAGNOSIS — D485 NEOPLASM OF UNCERTAIN BEHAVIOR OF SKIN: ICD-10-CM

## 2021-06-30 DIAGNOSIS — D18.0 HEMANGIOMA: ICD-10-CM

## 2021-06-30 DIAGNOSIS — L57.0 ACTINIC KERATOSIS: ICD-10-CM

## 2021-06-30 DIAGNOSIS — L81.4 OTHER MELANIN HYPERPIGMENTATION: ICD-10-CM

## 2021-06-30 DIAGNOSIS — D22 MELANOCYTIC NEVI: ICD-10-CM

## 2021-06-30 DIAGNOSIS — Z85.820 PERSONAL HISTORY OF MALIGNANT MELANOMA OF SKIN: ICD-10-CM

## 2021-06-30 DIAGNOSIS — Z85.828 PERSONAL HISTORY OF OTHER MALIGNANT NEOPLASM OF SKIN: ICD-10-CM

## 2021-06-30 DIAGNOSIS — L57.8 OTHER SKIN CHANGES DUE TO CHRONIC EXPOSURE TO NONIONIZING RADIATION: ICD-10-CM

## 2021-06-30 DIAGNOSIS — L82.1 OTHER SEBORRHEIC KERATOSIS: ICD-10-CM

## 2021-06-30 PROBLEM — D22.5 MELANOCYTIC NEVI OF TRUNK: Status: ACTIVE | Noted: 2021-06-30

## 2021-06-30 PROBLEM — D18.01 HEMANGIOMA OF SKIN AND SUBCUTANEOUS TISSUE: Status: ACTIVE | Noted: 2021-06-30

## 2021-06-30 PROBLEM — D48.5 NEOPLASM OF UNCERTAIN BEHAVIOR OF SKIN: Status: ACTIVE | Noted: 2021-06-30

## 2021-06-30 PROCEDURE — ? LIQUID NITROGEN

## 2021-06-30 PROCEDURE — ? DEFER

## 2021-06-30 PROCEDURE — 99213 OFFICE O/P EST LOW 20 MIN: CPT | Mod: 25

## 2021-06-30 PROCEDURE — 17004 DESTROY PREMAL LESIONS 15/>: CPT

## 2021-06-30 PROCEDURE — ? COUNSELING

## 2021-06-30 ASSESSMENT — LOCATION DETAILED DESCRIPTION DERM
LOCATION DETAILED: RIGHT INFERIOR TEMPLE
LOCATION DETAILED: RIGHT SUPERIOR ANTERIOR NECK
LOCATION DETAILED: RIGHT DORSAL FOREARM
LOCATION DETAILED: RIGHT DISTAL RADIAL DORSAL FOREARM
LOCATION DETAILED: LEFT INFERIOR POSTAURICULAR SKIN
LOCATION DETAILED: LEFT UPPER BACK
LOCATION DETAILED: RIGHT LOWER CUTANEOUS LIP
LOCATION DETAILED: LEFT ANTERIOR THIGH
LOCATION DETAILED: RIGHT CHEEK
LOCATION DETAILED: RIGHT ANTERIOR THIGH
LOCATION DETAILED: LEFT CENTRAL EYEBROW
LOCATION DETAILED: RIGHT ULNAR DORSAL HAND
LOCATION DETAILED: RIGHT INFERIOR CENTRAL MALAR CHEEK
LOCATION DETAILED: RIGHT PROXIMAL DORSAL FOREARM
LOCATION DETAILED: LEFT LATERAL INFERIOR EYELID
LOCATION DETAILED: RIGHT NASAL ALA
LOCATION DETAILED: LEFT DORSAL HAND
LOCATION DETAILED: RIGHT CENTRAL MALAR CHEEK
LOCATION DETAILED: LEFT UPPER CUTANEOUS LIP
LOCATION DETAILED: LEFT SUPERIOR PARIETAL SCALP
LOCATION DETAILED: MID POSTERIOR NECK
LOCATION DETAILED: LEFT MEDIAL FRONTAL SCALP
LOCATION DETAILED: RIGHT UPPER BACK
LOCATION DETAILED: LEFT LATERAL EYEBROW
LOCATION DETAILED: LEFT MID BACK
LOCATION DETAILED: NASAL DORSUM
LOCATION DETAILED: LEFT DORSAL FOREARM
LOCATION DETAILED: RIGHT LATERAL SUPERIOR CHEST
LOCATION DETAILED: LEFT CHEEK
LOCATION DETAILED: RIGHT MEDIAL CANTHUS
LOCATION DETAILED: RIGHT SUPERIOR LATERAL NECK
LOCATION DETAILED: POSTERIOR MID-PARIETAL SCALP
LOCATION DETAILED: RIGHT DORSAL HAND
LOCATION DETAILED: MIDDLE STERNUM

## 2021-06-30 ASSESSMENT — LOCATION SIMPLE DESCRIPTION DERM
LOCATION SIMPLE: RIGHT TEMPLE
LOCATION SIMPLE: RIGHT ANTERIOR NECK
LOCATION SIMPLE: RIGHT LOWER EXTREMITY
LOCATION SIMPLE: RIGHT CHEEK
LOCATION SIMPLE: RIGHT NOSE
LOCATION SIMPLE: RIGHT LIP
LOCATION SIMPLE: POSTERIOR SCALP
LOCATION SIMPLE: LEFT EYEBROW
LOCATION SIMPLE: BACK
LOCATION SIMPLE: SCALP
LOCATION SIMPLE: LEFT HAND
LOCATION SIMPLE: RIGHT HAND
LOCATION SIMPLE: CHEST
LOCATION SIMPLE: RIGHT FOREARM
LOCATION SIMPLE: NOSE
LOCATION SIMPLE: LEFT CHEEK
LOCATION SIMPLE: POSTERIOR NECK
LOCATION SIMPLE: LEFT LIP
LOCATION SIMPLE: LEFT LOWER EXTREMITY
LOCATION SIMPLE: LEFT UPPER EXTREMITY
LOCATION SIMPLE: RIGHT UPPER EXTREMITY
LOCATION SIMPLE: LEFT SCALP
LOCATION SIMPLE: LEFT INFERIOR EYELID
LOCATION SIMPLE: RIGHT EYELID

## 2021-06-30 ASSESSMENT — LOCATION ZONE DERM
LOCATION ZONE: LEG
LOCATION ZONE: EYELID
LOCATION ZONE: SCALP
LOCATION ZONE: TRUNK
LOCATION ZONE: FACE
LOCATION ZONE: NECK
LOCATION ZONE: ARM
LOCATION ZONE: LIP
LOCATION ZONE: HAND
LOCATION ZONE: NOSE

## 2021-06-30 NOTE — PROCEDURE: DEFER
Introduction Text (Please End With A Colon): The following procedure was deferred:  Will set up surgery appt.  Biopsy was done at another facility  ([facility])
Detail Level: Detailed

## 2021-06-30 NOTE — HPI: EVALUATION OF SKIN LESION(S)
What Type Of Note Output Would You Prefer (Optional)?: Standard Output
How Severe Are Your Spot(S)?: mild
Have Your Spot(S) Been Treated In The Past?: has not been treated
Hpi Title: Evaluation of Skin Lesions
Location: Left Pretibial Region
Year Removed: 2020

## 2021-06-30 NOTE — PROCEDURE: LIQUID NITROGEN
Detail Level: Simple
Consent: The patient's consent was obtained including but not limited to risks of crusting, scabbing, blistering, scarring, darker or lighter pigmentary change, recurrence, incomplete removal and infection.
Duration Of Freeze Thaw-Cycle (Seconds): 3
Render Post-Care Instructions In Note?: no
Aperture Size (Optional): C
Post-Care Instructions: I reviewed with the patient in detail post-care instructions. Patient is to wear sunprotection, and avoid picking at any of the treated lesions. Pt may apply Vaseline to crusted or scabbing areas.
Number Of Freeze-Thaw Cycles: 2 freeze-thaw cycles

## 2021-07-02 ENCOUNTER — OFFICE VISIT (OUTPATIENT)
Dept: MEDICAL GROUP | Facility: PHYSICIAN GROUP | Age: 86
End: 2021-07-02
Payer: MEDICARE

## 2021-07-02 ENCOUNTER — HOSPITAL ENCOUNTER (OUTPATIENT)
Facility: MEDICAL CENTER | Age: 86
End: 2021-07-02
Attending: NURSE PRACTITIONER
Payer: MEDICARE

## 2021-07-02 VITALS
RESPIRATION RATE: 16 BRPM | HEIGHT: 64 IN | HEART RATE: 63 BPM | TEMPERATURE: 97.9 F | OXYGEN SATURATION: 96 % | WEIGHT: 166 LBS | DIASTOLIC BLOOD PRESSURE: 88 MMHG | SYSTOLIC BLOOD PRESSURE: 122 MMHG | BODY MASS INDEX: 28.34 KG/M2

## 2021-07-02 DIAGNOSIS — N30.01 ACUTE CYSTITIS WITH HEMATURIA: ICD-10-CM

## 2021-07-02 PROBLEM — R30.0 DYSURIA: Status: ACTIVE | Noted: 2021-07-02

## 2021-07-02 LAB
APPEARANCE UR: NORMAL
BILIRUB UR STRIP-MCNC: NEGATIVE MG/DL
COLOR UR AUTO: NORMAL
GLUCOSE UR STRIP.AUTO-MCNC: NEGATIVE MG/DL
KETONES UR STRIP.AUTO-MCNC: NEGATIVE MG/DL
LEUKOCYTE ESTERASE UR QL STRIP.AUTO: NORMAL
NITRITE UR QL STRIP.AUTO: NEGATIVE
PH UR STRIP.AUTO: 6 [PH] (ref 5–8)
PROT UR QL STRIP: 100 MG/DL
RBC UR QL AUTO: NORMAL
SP GR UR STRIP.AUTO: 1.02
UROBILINOGEN UR STRIP-MCNC: 0.2 MG/DL

## 2021-07-02 PROCEDURE — 99213 OFFICE O/P EST LOW 20 MIN: CPT | Performed by: NURSE PRACTITIONER

## 2021-07-02 PROCEDURE — 87077 CULTURE AEROBIC IDENTIFY: CPT

## 2021-07-02 PROCEDURE — 87186 SC STD MICRODIL/AGAR DIL: CPT

## 2021-07-02 PROCEDURE — 81002 URINALYSIS NONAUTO W/O SCOPE: CPT | Performed by: NURSE PRACTITIONER

## 2021-07-02 PROCEDURE — 87086 URINE CULTURE/COLONY COUNT: CPT

## 2021-07-02 RX ORDER — NITROFURANTOIN 25; 75 MG/1; MG/1
100 CAPSULE ORAL 2 TIMES DAILY
Qty: 14 CAPSULE | Refills: 0 | Status: SHIPPED | OUTPATIENT
Start: 2021-07-02 | End: 2021-07-09

## 2021-07-02 ASSESSMENT — FIBROSIS 4 INDEX: FIB4 SCORE: 2.64

## 2021-07-02 NOTE — PROGRESS NOTES
"Subjective:     CC: Burning with urination and frequency    HPI:   Marry presents today with the following:    Dysuria  Acute medical problem.  She reports burning with urination that started 4 days ago.  She has associated urinary frequency. She has been increasing her water intake. She has history of hemicolectomy and was having constipation. She took a laxative and had loose stools. No recent urinary tract infections. She was recently on Keflex for her recent eye surgery. Denies fever, chills, nausea, vomiting, hematuria, or flank pain.      Past Medical History:   Diagnosis Date   • Adenocarcinoma of colon (HCC) 10/30/2018    From sessile serrated polyp 10/2018   • Anesthesia     pt states \"one new dr scraped my esophagus when they put the tube in and I started bleeding so they couldn't operate\"    • Atrial fibrillation [I48.91] 4/19/2016     pt denies    • Back pain 6/27/2012   • Blood clotting disorder (HCC) 2012    clot in leg   • Bowel habit changes     constipation    • Cancer (HCC)     skin, colon 2018   • Cataract     belia IOL    • Chronic back pain greater than 3 months duration    • Deep vein thrombosis (HCC) 3/8/2016    First occurrence in [facility] in late 1970s Second occurrence further up in [facility] in 2012, has been on AC since    • Essential hypertension, benign 6/27/2012   • GERD (gastroesophageal reflux disease) 6/27/2012   • Heart murmur    • Hyperlipidemia    • Hypertension     hx of, not currently    • Impaired fasting glucose 11/2/2017   • Mild aortic stenosis 11/2/2017   • Other and unspecified hyperlipidemia 6/27/2012   • Prediabetes    • Protein S deficiency (HCC) 11/2/2017    Noted in lab work 2013 as a part of work up at [facility]    • Rheumatoid arthritis involving multiple sites with positive rheumatoid factor (HCC) 03/14/2016    Dr. Escoto   • Rheumatoid nodule (HCC) 7/25/2017   • Right bundle branch block 6/27/2012    pt denies    • Urinary incontinence 11/2/2017       Social History "     Tobacco Use   • Smoking status: Never Smoker   • Smokeless tobacco: Never Used   Vaping Use   • Vaping Use: Never used   Substance Use Topics   • Alcohol use: Not Currently     Alcohol/week: 0.0 oz     Comment: occ   • Drug use: No       Current Outpatient Medications Ordered in Epic   Medication Sig Dispense Refill   • nitrofurantoin (MACROBID) 100 MG Cap Take 1 capsule by mouth 2 times a day for 7 days. 14 capsule 0   • gabapentin (NEURONTIN) 100 MG Cap 1 tab po qhs 90 capsule 0   • hydroxychloroquine (PLAQUENIL) 200 MG Tab Take 1 tablet by mouth 2 times a day. 180 tablet 1   • omeprazole (PRILOSEC) 20 MG delayed-release capsule Take 1 capsule by mouth every day. 90 capsule 1   • pregabalin (LYRICA) 150 MG Cap      • lidocaine (LIDODERM) 5 % Patch APPLY ONE PATCH TO CLEAN DRY SKIN AS DIRECTED DAILY 90 Patch 1   • atorvastatin (LIPITOR) 10 MG Tab Take 1 tablet by mouth every evening. 90 tablet 3   • rivaroxaban (XARELTO) 20 MG Tab tablet Take 1 tablet by mouth with dinner. 90 tablet 1   • Potassium 99 MG Tab Take 99 mg by mouth every day.     • terbinafine (LAMISIL) 250 MG Tab Take 1 Tab by mouth every day. 90 Tab 0   • Diphenhydramine-APAP, sleep, (TYLENOL PM EXTRA STRENGTH)  MG Tab Take 2 Tabs by mouth every bedtime.     • acetaminophen (TYLENOL) 500 MG Tab Take 1,000 mg by mouth 2 Times a Day.     • Melatonin 10 MG Tab Take 1 Tab by mouth every bedtime.     • vitamin D (CHOLECALCIFEROL) 1000 UNIT Tab Take 1,000 Units by mouth every morning.     • Calcium Carbonate-Vitamin D (CALCIUM + D PO) Take 1 Tab by mouth every bedtime.       Current Facility-Administered Medications Ordered in Epic   Medication Dose Route Frequency Provider Last Rate Last Admin   • denosumab (PROLIA) subq injection 60 mg  60 mg Subcutaneous Q6 MO Deanna Escoto M.D.   60 mg at 06/21/21 1432       Allergies:  Sulfa drugs    Health Maintenance: Deferred     ROS:  Per HPI      Objective:     Vital signs reviewed   Exam:  BP  "122/88 (BP Location: Left arm, Patient Position: Sitting, BP Cuff Size: Adult)   Pulse 63   Temp 36.6 °C (97.9 °F) (Temporal)   Resp 16   Ht 1.626 m (5' 4\")   Wt 75.3 kg (166 lb)   SpO2 96%   BMI 28.49 kg/m²  Body mass index is 28.49 kg/m².    Gen: Alert and oriented, No apparent distress.  Eyes:   Lids normal. Glasses in place.   Neck: Neck is supple without lymphadenopathy.  Lungs: Normal effort, CTA bilaterally, no wheezes, rhonchi, or rales. No CVA tenderness.   CV: Regular rate and rhythm. No murmurs, rubs, or gallops.  Ext: No clubbing, cyanosis, edema. Using cane with ambulation.       Labs:   Results for SHAILESH BYRD (MRN [id number]) as of 7/2/2021 11:16   Ref. Range 7/2/2021 10:52   POC Color Latest Ref Range: Negative  Dark Yellow   POC Appearance Latest Ref Range: Negative  Cloudy   POC Specific Gravity Latest Ref Range: <1.005 - >1.030  1.025   POC Urine PH Latest Ref Range: 5.0 - 8.0  6.0   POC Glucose Latest Ref Range: Negative mg/dL negative   POC Ketones Latest Ref Range: Negative mg/dL negative   POC Protein Latest Ref Range: Negative mg/dL 100   POC Nitrites Latest Ref Range: Negative  negative   POC Leukocyte Esterase Latest Ref Range: Negative  small   POC Blood Latest Ref Range: Negative  large   POC Bilirubin Latest Ref Range: Negative mg/dL negative   POC Urobiligen Latest Ref Range: Negative (0.2) mg/dL 0.2       Assessment & Plan:     85 y.o. female with the following -     1. Acute cystitis with hematuria  Acute uncomplicated problem.  Urinalysis today positive for occult, protein and leukocytes. She is currently on Xarelto. We will start her on nitrofurantoin.  We will culture her urine.  We discussed keeping the area clean, wiping front to back, emptying bladder frequently while awake, and changing her pads frequently.  Red flags discussed.  - POCT Urinalysis  - URINE CULTURE(NEW); Future  - nitrofurantoin (MACROBID) 100 MG Cap; Take 1 capsule by mouth 2 times a day for 7 " days.  Dispense: 14 capsule; Refill: 0      Return if symptoms worsen or fail to improve.    Please note that this dictation was created using voice recognition software. I have made every reasonable attempt to correct obvious errors, but I expect that there are errors of grammar and possibly content that I did not discover before finalizing the note.

## 2021-07-02 NOTE — ASSESSMENT & PLAN NOTE
Acute medical problem.  She reports burning with urination that started 4 days ago.  She has associated urinary frequency. She has been increasing her water intake. She has history of hemicolectomy and was having constipation. She took a laxative and had loose stools. No recent urinary tract infections. She was recently on Keflex for her recent eye surgery. Denies fever, chills, nausea, vomiting, hematuria, or flank pain.

## 2021-07-05 LAB
BACTERIA UR CULT: ABNORMAL
BACTERIA UR CULT: ABNORMAL
SIGNIFICANT IND 70042: ABNORMAL
SITE SITE: ABNORMAL
SOURCE SOURCE: ABNORMAL

## 2021-07-23 ENCOUNTER — APPOINTMENT (RX ONLY)
Dept: URBAN - METROPOLITAN AREA CLINIC 4 | Facility: CLINIC | Age: 86
Setting detail: DERMATOLOGY
End: 2021-07-23

## 2021-07-23 DIAGNOSIS — L72.8 OTHER FOLLICULAR CYSTS OF THE SKIN AND SUBCUTANEOUS TISSUE: ICD-10-CM

## 2021-07-23 PROBLEM — D48.5 NEOPLASM OF UNCERTAIN BEHAVIOR OF SKIN: Status: ACTIVE | Noted: 2021-07-23

## 2021-07-23 PROCEDURE — 11404 EXC TR-EXT B9+MARG 3.1-4 CM: CPT

## 2021-07-23 PROCEDURE — 13101 CMPLX RPR TRUNK 2.6-7.5 CM: CPT

## 2021-07-23 PROCEDURE — ? EXCISION

## 2021-07-23 ASSESSMENT — LOCATION ZONE DERM: LOCATION ZONE: TRUNK

## 2021-07-23 ASSESSMENT — LOCATION DETAILED DESCRIPTION DERM: LOCATION DETAILED: MIDDLE STERNUM

## 2021-07-23 ASSESSMENT — LOCATION SIMPLE DESCRIPTION DERM: LOCATION SIMPLE: CHEST

## 2021-07-23 NOTE — PROCEDURE: EXCISION
Medical Necessity Information: It is in your best interest to select a reason for this procedure from the list below. All of these items fulfill various CMS LCD requirements except lesion extends to a margin.
Include Z78.9 (Other Specified Conditions Influencing Health Status) As An Associated Diagnosis?: No
Medical Necessity Clause: This procedure was medically necessary because the lesion that was treated was:
Lab: [id number]
Lab Facility: 
Surgeon (Optional): Ayesha
Biopsy Photograph Reviewed: Yes
Size Of Lesion In Cm: 2.9
X Size Of Lesion In Cm (Optional): 0
Size Of Margin In Cm: 0.2
Anesthesia Volume In Cc: 8.7
Eye Clamp Note Details: An eye clamp was used during the procedure.
Excision Method: Elliptical
Repair Type: Complex
Suturegard Retention Suture: 2-0 Nylon
Retention Suture Bite Size: 3 mm
Length To Time In Minutes Device Was In Place: 10
Number Of Hemigard Strips Per Side: 1
Intermediate / Complex Repair - Final Wound Length In Cm: 4.1
Width Of Defect Perpendicular To Closure In Cm (Required): 2.3
Distance Of Undermining In Cm (Required): 2.7
Undermining Type: Entire Wound
Debridement Text: The wound edges were debrided prior to proceeding with the closure to facilitate wound healing.
Helical Rim Text: The closure involved the helical rim.
Vermilion Border Text: The closure involved the vermilion border.
Nostril Rim Text: The closure involved the nostril rim.
Retention Suture Text: Retention sutures were placed to support the closure and prevent dehiscence.
Epidermal Closure Graft Donor Site (Optional): simple interrupted
Graft Donor Site Bandage (Optional-Leave Blank If You Don't Want In Note): Steri-strips and a pressure bandage were applied to the donor site.
Detail Level: Detailed
Excision Depth: adipose tissue
Scalpel Size: 15 blade
Anesthesia Type: 1% lidocaine with 1:100,000 epinephrine and 408mcg clindamycin/ml and a 1:10 solution of 8.4% sodium bicarbonate
Additional Anesthesia Volume In Cc: 6
Hemostasis: Electrocautery
Estimated Blood Loss (Cc): minimal
Repair Anesthesia Method: local infiltration
Anesthesia Volume In Cc: 9.8
Deep Sutures: 2-0 Vicryl
Dermal Closure: buried vertical mattress
Epidermal Sutures: 5-0 Caprosyn
Epidermal Closure: running subcuticular
Wound Care: Bacitracin
Dressing: steri-strips
Suturegard Intro: Intraoperative tissue expansion was performed, utilizing the SUTUREGARD device, in order to reduce wound tension.
Suturegard Body: The suture ends were repeatedly re-tightened and re-clamped to achieve the desired tissue expansion.
Hemigard Intro: Due to skin fragility and wound tension, it was decided to use HEMIGARD adhesive retention suture devices to permit a linear closure. The skin was cleaned and dried for a 6cm distance away from the wound. Excessive hair, if present, was removed to allow for adhesion.
Hemigard Postcare Instructions: The HEMIGARD strips are to remain completely dry for at least 5-7 days.
Positioning (Leave Blank If You Do Not Want): The patient was placed in a comfortable position exposing the surgical site.
Complex Repair Preamble Text (Leave Blank If You Do Not Want): Extensive wide undermining was performed.
Intermediate Repair Preamble Text (Leave Blank If You Do Not Want): Undermining was performed with blunt dissection.
Fusiform Excision Additional Text (Leave Blank If You Do Not Want): The margin was drawn around the clinically apparent lesion.  A fusiform shape was then drawn on the skin incorporating the lesion and margins.  Incisions were then made along these lines to the appropriate tissue plane and the lesion was extirpated.
Eliptical Excision Additional Text (Leave Blank If You Do Not Want): The margin was drawn around the clinically apparent lesion.  An elliptical shape was then drawn on the skin incorporating the lesion and margins.  Incisions were then made along these lines to the appropriate tissue plane and the lesion was extirpated.
Saucerization Excision Additional Text (Leave Blank If You Do Not Want): The margin was drawn around the clinically apparent lesion.  Incisions were then made along these lines, in a tangential fashion, to the appropriate tissue plane and the lesion was extirpated.
Slit Excision Additional Text (Leave Blank If You Do Not Want): A linear line was drawn on the skin overlying the lesion. An incision was made slowly until the lesion was visualized.  Once visualized, the lesion was removed with blunt dissection.
Excisional Biopsy Additional Text (Leave Blank If You Do Not Want): The margin was drawn around the clinically apparent lesion. An elliptical shape was then drawn on the skin incorporating the lesion and margins.  Incisions were then made along these lines to the appropriate tissue plane and the lesion was extirpated.
Perilesional Excision Additional Text (Leave Blank If You Do Not Want): The margin was drawn around the clinically apparent lesion. Incisions were then made along these lines to the appropriate tissue plane and the lesion was extirpated.
Repair Performed By Another Provider Text (Leave Blank If You Do Not Want): After the tissue was excised the defect was repaired by another provider.
No Repair - Repaired With Adjacent Surgical Defect Text (Leave Blank If You Do Not Want): After the excision the defect was repaired concurrently with another surgical defect which was in close approximation.
Advancement Flap (Single) Text: The defect edges were debeveled with a #15 scalpel blade.  Given the location of the defect and the proximity to free margins a single advancement flap was deemed most appropriate.  Using a sterile surgical marker, an appropriate advancement flap was drawn incorporating the defect and placing the expected incisions within the relaxed skin tension lines where possible.    The area thus outlined was incised deep to adipose tissue with a #15 scalpel blade.  The skin margins were undermined to an appropriate distance in all directions utilizing iris scissors.
Advancement Flap (Double) Text: The defect edges were debeveled with a #15 scalpel blade.  Given the location of the defect and the proximity to free margins a double advancement flap was deemed most appropriate.  Using a sterile surgical marker, the appropriate advancement flaps were drawn incorporating the defect and placing the expected incisions within the relaxed skin tension lines where possible.    The area thus outlined was incised deep to adipose tissue with a #15 scalpel blade.  The skin margins were undermined to an appropriate distance in all directions utilizing iris scissors.
Comment: Moderate severity comedonal and inflammatory acne primarily of forehead, minor involvement of chest and shoulders clear today. Patient has been using tretinoin 0.05% cream only to the area. Plan to add oral doxycycline and modify topical therapy as below, f/u 2-3 months
Burow's Advancement Flap Text: The defect edges were debeveled with a #15 scalpel blade.  Given the location of the defect and the proximity to free margins a Burow's advancement flap was deemed most appropriate.  Using a sterile surgical marker, the appropriate advancement flap was drawn incorporating the defect and placing the expected incisions within the relaxed skin tension lines where possible.    The area thus outlined was incised deep to adipose tissue with a #15 scalpel blade.  The skin margins were undermined to an appropriate distance in all directions utilizing iris scissors.
Chonodrocutaneous Helical Advancement Flap Text: The defect edges were debeveled with a #15 scalpel blade.  Given the location of the defect and the proximity to free margins a chondrocutaneous helical advancement flap was deemed most appropriate.  Using a sterile surgical marker, the appropriate advancement flap was drawn incorporating the defect and placing the expected incisions within the relaxed skin tension lines where possible.    The area thus outlined was incised deep to adipose tissue with a #15 scalpel blade.  The skin margins were undermined to an appropriate distance in all directions utilizing iris scissors.
Detail Level: Simple
Crescentic Advancement Flap Text: The defect edges were debeveled with a #15 scalpel blade.  Given the location of the defect and the proximity to free margins a crescentic advancement flap was deemed most appropriate.  Using a sterile surgical marker, the appropriate advancement flap was drawn incorporating the defect and placing the expected incisions within the relaxed skin tension lines where possible.    The area thus outlined was incised deep to adipose tissue with a #15 scalpel blade.  The skin margins were undermined to an appropriate distance in all directions utilizing iris scissors.
A-T Advancement Flap Text: The defect edges were debeveled with a #15 scalpel blade.  Given the location of the defect, shape of the defect and the proximity to free margins an A-T advancement flap was deemed most appropriate.  Using a sterile surgical marker, an appropriate advancement flap was drawn incorporating the defect and placing the expected incisions within the relaxed skin tension lines where possible.    The area thus outlined was incised deep to adipose tissue with a #15 scalpel blade.  The skin margins were undermined to an appropriate distance in all directions utilizing iris scissors.
O-T Advancement Flap Text: The defect edges were debeveled with a #15 scalpel blade.  Given the location of the defect, shape of the defect and the proximity to free margins an O-T advancement flap was deemed most appropriate.  Using a sterile surgical marker, an appropriate advancement flap was drawn incorporating the defect and placing the expected incisions within the relaxed skin tension lines where possible.    The area thus outlined was incised deep to adipose tissue with a #15 scalpel blade.  The skin margins were undermined to an appropriate distance in all directions utilizing iris scissors.
O-L Flap Text: The defect edges were debeveled with a #15 scalpel blade.  Given the location of the defect, shape of the defect and the proximity to free margins an O-L flap was deemed most appropriate.  Using a sterile surgical marker, an appropriate advancement flap was drawn incorporating the defect and placing the expected incisions within the relaxed skin tension lines where possible.    The area thus outlined was incised deep to adipose tissue with a #15 scalpel blade.  The skin margins were undermined to an appropriate distance in all directions utilizing iris scissors.
O-Z Flap Text: The defect edges were debeveled with a #15 scalpel blade.  Given the location of the defect, shape of the defect and the proximity to free margins an O-Z flap was deemed most appropriate.  Using a sterile surgical marker, an appropriate transposition flap was drawn incorporating the defect and placing the expected incisions within the relaxed skin tension lines where possible. The area thus outlined was incised deep to adipose tissue with a #15 scalpel blade.  The skin margins were undermined to an appropriate distance in all directions utilizing iris scissors.
Double O-Z Flap Text: The defect edges were debeveled with a #15 scalpel blade.  Given the location of the defect, shape of the defect and the proximity to free margins a Double O-Z flap was deemed most appropriate.  Using a sterile surgical marker, an appropriate transposition flap was drawn incorporating the defect and placing the expected incisions within the relaxed skin tension lines where possible. The area thus outlined was incised deep to adipose tissue with a #15 scalpel blade.  The skin margins were undermined to an appropriate distance in all directions utilizing iris scissors.
V-Y Flap Text: The defect edges were debeveled with a #15 scalpel blade.  Given the location of the defect, shape of the defect and the proximity to free margins a V-Y flap was deemed most appropriate.  Using a sterile surgical marker, an appropriate advancement flap was drawn incorporating the defect and placing the expected incisions within the relaxed skin tension lines where possible.    The area thus outlined was incised deep to adipose tissue with a #15 scalpel blade.  The skin margins were undermined to an appropriate distance in all directions utilizing iris scissors.
Advancement-Rotation Flap Text: The defect edges were debeveled with a #15 scalpel blade.  Given the location of the defect, shape of the defect and the proximity to free margins an advancement-rotation flap was deemed most appropriate.  Using a sterile surgical marker, an appropriate flap was drawn incorporating the defect and placing the expected incisions within the relaxed skin tension lines where possible. The area thus outlined was incised deep to adipose tissue with a #15 scalpel blade.  The skin margins were undermined to an appropriate distance in all directions utilizing iris scissors.
Mercedes Flap Text: The defect edges were debeveled with a #15 scalpel blade.  Given the location of the defect, shape of the defect and the proximity to free margins a Mercedes flap was deemed most appropriate.  Using a sterile surgical marker, an appropriate advancement flap was drawn incorporating the defect and placing the expected incisions within the relaxed skin tension lines where possible. The area thus outlined was incised deep to adipose tissue with a #15 scalpel blade.  The skin margins were undermined to an appropriate distance in all directions utilizing iris scissors.
Modified Advancement Flap Text: The defect edges were debeveled with a #15 scalpel blade.  Given the location of the defect, shape of the defect and the proximity to free margins a modified advancement flap was deemed most appropriate.  Using a sterile surgical marker, an appropriate advancement flap was drawn incorporating the defect and placing the expected incisions within the relaxed skin tension lines where possible.    The area thus outlined was incised deep to adipose tissue with a #15 scalpel blade.  The skin margins were undermined to an appropriate distance in all directions utilizing iris scissors.
Mucosal Advancement Flap Text: Given the location of the defect, shape of the defect and the proximity to free margins a mucosal advancement flap was deemed most appropriate. Incisions were made with a 15 blade scalpel in the appropriate fashion along the cutaneous vermillion border and the mucosal lip. The remaining actinically damaged mucosal tissue was excised.  The mucosal advancement flap was then elevated to the gingival sulcus with care taken to preserve the neurovascular structures and advanced into the primary defect. Care was taken to ensure that precise realignment of the vermilion border was achieved.
Peng Advancement Flap Text: The defect edges were debeveled with a #15 scalpel blade.  Given the location of the defect, shape of the defect and the proximity to free margins a Peng advancement flap was deemed most appropriate.  Using a sterile surgical marker, an appropriate advancement flap was drawn incorporating the defect and placing the expected incisions within the relaxed skin tension lines where possible. The area thus outlined was incised deep to adipose tissue with a #15 scalpel blade.  The skin margins were undermined to an appropriate distance in all directions utilizing iris scissors.
Hatchet Flap Text: The defect edges were debeveled with a #15 scalpel blade.  Given the location of the defect, shape of the defect and the proximity to free margins a hatchet flap was deemed most appropriate.  Using a sterile surgical marker, an appropriate hatchet flap was drawn incorporating the defect and placing the expected incisions within the relaxed skin tension lines where possible.    The area thus outlined was incised deep to adipose tissue with a #15 scalpel blade.  The skin margins were undermined to an appropriate distance in all directions utilizing iris scissors.
Rotation Flap Text: The defect edges were debeveled with a #15 scalpel blade.  Given the location of the defect, shape of the defect and the proximity to free margins a rotation flap was deemed most appropriate.  Using a sterile surgical marker, an appropriate rotation flap was drawn incorporating the defect and placing the expected incisions within the relaxed skin tension lines where possible.    The area thus outlined was incised deep to adipose tissue with a #15 scalpel blade.  The skin margins were undermined to an appropriate distance in all directions utilizing iris scissors.
Comment: Patient counseled on diagnosis, favor cyst over scar. Previously treated with IL TAC with some improvement but now increasing in size. Treatment options reviewed today including IL TAC vs excision. Patient prefers IL TAC for now.
Spiral Flap Text: The defect edges were debeveled with a #15 scalpel blade.  Given the location of the defect, shape of the defect and the proximity to free margins a spiral flap was deemed most appropriate.  Using a sterile surgical marker, an appropriate rotation flap was drawn incorporating the defect and placing the expected incisions within the relaxed skin tension lines where possible. The area thus outlined was incised deep to adipose tissue with a #15 scalpel blade.  The skin margins were undermined to an appropriate distance in all directions utilizing iris scissors.
Staged Advancement Flap Text: The defect edges were debeveled with a #15 scalpel blade.  Given the location of the defect, shape of the defect and the proximity to free margins a staged advancement flap was deemed most appropriate.  Using a sterile surgical marker, an appropriate advancement flap was drawn incorporating the defect and placing the expected incisions within the relaxed skin tension lines where possible. The area thus outlined was incised deep to adipose tissue with a #15 scalpel blade.  The skin margins were undermined to an appropriate distance in all directions utilizing iris scissors.
Star Wedge Flap Text: The defect edges were debeveled with a #15 scalpel blade.  Given the location of the defect, shape of the defect and the proximity to free margins a star wedge flap was deemed most appropriate.  Using a sterile surgical marker, an appropriate rotation flap was drawn incorporating the defect and placing the expected incisions within the relaxed skin tension lines where possible. The area thus outlined was incised deep to adipose tissue with a #15 scalpel blade.  The skin margins were undermined to an appropriate distance in all directions utilizing iris scissors.
Transposition Flap Text: The defect edges were debeveled with a #15 scalpel blade.  Given the location of the defect and the proximity to free margins a transposition flap was deemed most appropriate.  Using a sterile surgical marker, an appropriate transposition flap was drawn incorporating the defect.    The area thus outlined was incised deep to adipose tissue with a #15 scalpel blade.  The skin margins were undermined to an appropriate distance in all directions utilizing iris scissors.
Muscle Hinge Flap Text: The defect edges were debeveled with a #15 scalpel blade.  Given the size, depth and location of the defect and the proximity to free margins a muscle hinge flap was deemed most appropriate.  Using a sterile surgical marker, an appropriate hinge flap was drawn incorporating the defect. The area thus outlined was incised with a #15 scalpel blade.  The skin margins were undermined to an appropriate distance in all directions utilizing iris scissors.
Nasal Turnover Hinge Flap Text: The defect edges were debeveled with a #15 scalpel blade.  Given the size, depth, location of the defect and the defect being full thickness a nasal turnover hinge flap was deemed most appropriate.  Using a sterile surgical marker, an appropriate hinge flap was drawn incorporating the defect. The area thus outlined was incised with a #15 scalpel blade. The flap was designed to recreate the nasal mucosal lining and the alar rim. The skin margins were undermined to an appropriate distance in all directions utilizing iris scissors.
Nasalis-Muscle-Based Myocutaneous Island Pedicle Flap Text: Using a #15 blade, an incision was made around the donor flap to the level of the nasalis muscle. Wide lateral undermining was then performed in both the subcutaneous plane above the nasalis muscle, and in a submuscular plane just above periosteum. This allowed the formation of a free nasalis muscle axial pedicle (based on the angular artery) which was still attached to the actual cutaneous flap, increasing its mobility and vascular viability. Hemostasis was obtained with pinpoint electrocoagulation. The flap was mobilized into position and the pivotal anchor points positioned and stabilized with buried interrupted sutures. Subcutaneous and dermal tissues were closed in a multilayered fashion with sutures. Tissue redundancies were excised, and the epidermal edges were apposed without significant tension and sutured with sutures.
Orbicularis Oris Muscle Flap Text: The defect edges were debeveled with a #15 scalpel blade.  Given that the defect affected the competency of the oral sphincter an obicularis oris muscle flap was deemed most appropriate to restore this competency and normal muscle function.  Using a sterile surgical marker, an appropriate flap was drawn incorporating the defect. The area thus outlined was incised with a #15 scalpel blade.
Melolabial Transposition Flap Text: The defect edges were debeveled with a #15 scalpel blade.  Given the location of the defect and the proximity to free margins a melolabial flap was deemed most appropriate.  Using a sterile surgical marker, an appropriate melolabial transposition flap was drawn incorporating the defect.    The area thus outlined was incised deep to adipose tissue with a #15 scalpel blade.  The skin margins were undermined to an appropriate distance in all directions utilizing iris scissors.
Rhombic Flap Text: The defect edges were debeveled with a #15 scalpel blade.  Given the location of the defect and the proximity to free margins a rhombic flap was deemed most appropriate.  Using a sterile surgical marker, an appropriate rhombic flap was drawn incorporating the defect.    The area thus outlined was incised deep to adipose tissue with a #15 scalpel blade.  The skin margins were undermined to an appropriate distance in all directions utilizing iris scissors.
Rhomboid Transposition Flap Text: The defect edges were debeveled with a #15 scalpel blade.  Given the location of the defect and the proximity to free margins a rhomboid transposition flap was deemed most appropriate.  Using a sterile surgical marker, an appropriate rhomboid flap was drawn incorporating the defect.    The area thus outlined was incised deep to adipose tissue with a #15 scalpel blade.  The skin margins were undermined to an appropriate distance in all directions utilizing iris scissors.
Bi-Rhombic Flap Text: The defect edges were debeveled with a #15 scalpel blade.  Given the location of the defect and the proximity to free margins a bi-rhombic flap was deemed most appropriate.  Using a sterile surgical marker, an appropriate rhombic flap was drawn incorporating the defect. The area thus outlined was incised deep to adipose tissue with a #15 scalpel blade.  The skin margins were undermined to an appropriate distance in all directions utilizing iris scissors.
Helical Rim Advancement Flap Text: The defect edges were debeveled with a #15 blade scalpel.  Given the location of the defect and the proximity to free margins (helical rim) a double helical rim advancement flap was deemed most appropriate.  Using a sterile surgical marker, the appropriate advancement flaps were drawn incorporating the defect and placing the expected incisions between the helical rim and antihelix where possible.  The area thus outlined was incised through and through with a #15 scalpel blade.  With a skin hook and iris scissors, the flaps were gently and sharply undermined and freed up.
Bilateral Helical Rim Advancement Flap Text: The defect edges were debeveled with a #15 blade scalpel.  Given the location of the defect and the proximity to free margins (helical rim) a bilateral helical rim advancement flap was deemed most appropriate.  Using a sterile surgical marker, the appropriate advancement flaps were drawn incorporating the defect and placing the expected incisions between the helical rim and antihelix where possible.  The area thus outlined was incised through and through with a #15 scalpel blade.  With a skin hook and iris scissors, the flaps were gently and sharply undermined and freed up.
Ear Star Wedge Flap Text: The defect edges were debeveled with a #15 blade scalpel.  Given the location of the defect and the proximity to free margins (helical rim) an ear star wedge flap was deemed most appropriate.  Using a sterile surgical marker, the appropriate flap was drawn incorporating the defect and placing the expected incisions between the helical rim and antihelix where possible.  The area thus outlined was incised through and through with a #15 scalpel blade.
Banner Transposition Flap Text: The defect edges were debeveled with a #15 scalpel blade.  Given the location of the defect and the proximity to free margins a Banner transposition flap was deemed most appropriate.  Using a sterile surgical marker, an appropriate flap drawn around the defect. The area thus outlined was incised deep to adipose tissue with a #15 scalpel blade.  The skin margins were undermined to an appropriate distance in all directions utilizing iris scissors.
Bilobed Flap Text: The defect edges were debeveled with a #15 scalpel blade.  Given the location of the defect and the proximity to free margins a bilobe flap was deemed most appropriate.  Using a sterile surgical marker, an appropriate bilobe flap drawn around the defect.    The area thus outlined was incised deep to adipose tissue with a #15 scalpel blade.  The skin margins were undermined to an appropriate distance in all directions utilizing iris scissors.
Bilobed Transposition Flap Text: The defect edges were debeveled with a #15 scalpel blade.  Given the location of the defect and the proximity to free margins a bilobed transposition flap was deemed most appropriate.  Using a sterile surgical marker, an appropriate bilobe flap drawn around the defect.    The area thus outlined was incised deep to adipose tissue with a #15 scalpel blade.  The skin margins were undermined to an appropriate distance in all directions utilizing iris scissors.
Trilobed Flap Text: The defect edges were debeveled with a #15 scalpel blade.  Given the location of the defect and the proximity to free margins a trilobed flap was deemed most appropriate.  Using a sterile surgical marker, an appropriate trilobed flap drawn around the defect.    The area thus outlined was incised deep to adipose tissue with a #15 scalpel blade.  The skin margins were undermined to an appropriate distance in all directions utilizing iris scissors.
Dorsal Nasal Flap Text: The defect edges were debeveled with a #15 scalpel blade.  Given the location of the defect and the proximity to free margins a dorsal nasal flap was deemed most appropriate.  Using a sterile surgical marker, an appropriate dorsal nasal flap was drawn around the defect.    The area thus outlined was incised deep to adipose tissue with a #15 scalpel blade.  The skin margins were undermined to an appropriate distance in all directions utilizing iris scissors.
Island Pedicle Flap Text: The defect edges were debeveled with a #15 scalpel blade.  Given the location of the defect, shape of the defect and the proximity to free margins an island pedicle advancement flap was deemed most appropriate.  Using a sterile surgical marker, an appropriate advancement flap was drawn incorporating the defect, outlining the appropriate donor tissue and placing the expected incisions within the relaxed skin tension lines where possible.    The area thus outlined was incised deep to adipose tissue with a #15 scalpel blade.  The skin margins were undermined to an appropriate distance in all directions around the primary defect and laterally outward around the island pedicle utilizing iris scissors.  There was minimal undermining beneath the pedicle flap.
Island Pedicle Flap With Canthal Suspension Text: The defect edges were debeveled with a #15 scalpel blade.  Given the location of the defect, shape of the defect and the proximity to free margins an island pedicle advancement flap was deemed most appropriate.  Using a sterile surgical marker, an appropriate advancement flap was drawn incorporating the defect, outlining the appropriate donor tissue and placing the expected incisions within the relaxed skin tension lines where possible. The area thus outlined was incised deep to adipose tissue with a #15 scalpel blade.  The skin margins were undermined to an appropriate distance in all directions around the primary defect and laterally outward around the island pedicle utilizing iris scissors.  There was minimal undermining beneath the pedicle flap. A suspension suture was placed in the canthal tendon to prevent tension and prevent ectropion.
Alar Island Pedicle Flap Text: The defect edges were debeveled with a #15 scalpel blade.  Given the location of the defect, shape of the defect and the proximity to the alar rim an island pedicle advancement flap was deemed most appropriate.  Using a sterile surgical marker, an appropriate advancement flap was drawn incorporating the defect, outlining the appropriate donor tissue and placing the expected incisions within the nasal ala running parallel to the alar rim. The area thus outlined was incised with a #15 scalpel blade.  The skin margins were undermined minimally to an appropriate distance in all directions around the primary defect and laterally outward around the island pedicle utilizing iris scissors.  There was minimal undermining beneath the pedicle flap.
Double Island Pedicle Flap Text: The defect edges were debeveled with a #15 scalpel blade.  Given the location of the defect, shape of the defect and the proximity to free margins a double island pedicle advancement flap was deemed most appropriate.  Using a sterile surgical marker, an appropriate advancement flap was drawn incorporating the defect, outlining the appropriate donor tissue and placing the expected incisions within the relaxed skin tension lines where possible.    The area thus outlined was incised deep to adipose tissue with a #15 scalpel blade.  The skin margins were undermined to an appropriate distance in all directions around the primary defect and laterally outward around the island pedicle utilizing iris scissors.  There was minimal undermining beneath the pedicle flap.
Island Pedicle Flap-Requiring Vessel Identification Text: The defect edges were debeveled with a #15 scalpel blade.  Given the location of the defect, shape of the defect and the proximity to free margins an island pedicle advancement flap was deemed most appropriate.  Using a sterile surgical marker, an appropriate advancement flap was drawn, based on the axial vessel mentioned above, incorporating the defect, outlining the appropriate donor tissue and placing the expected incisions within the relaxed skin tension lines where possible.    The area thus outlined was incised deep to adipose tissue with a #15 scalpel blade.  The skin margins were undermined to an appropriate distance in all directions around the primary defect and laterally outward around the island pedicle utilizing iris scissors.  There was minimal undermining beneath the pedicle flap.
Keystone Flap Text: The defect edges were debeveled with a #15 scalpel blade.  Given the location of the defect, shape of the defect a keystone flap was deemed most appropriate.  Using a sterile surgical marker, an appropriate keystone flap was drawn incorporating the defect, outlining the appropriate donor tissue and placing the expected incisions within the relaxed skin tension lines where possible. The area thus outlined was incised deep to adipose tissue with a #15 scalpel blade.  The skin margins were undermined to an appropriate distance in all directions around the primary defect and laterally outward around the flap utilizing iris scissors.
O-T Plasty Text: The defect edges were debeveled with a #15 scalpel blade.  Given the location of the defect, shape of the defect and the proximity to free margins an O-T plasty was deemed most appropriate.  Using a sterile surgical marker, an appropriate O-T plasty was drawn incorporating the defect and placing the expected incisions within the relaxed skin tension lines where possible.    The area thus outlined was incised deep to adipose tissue with a #15 scalpel blade.  The skin margins were undermined to an appropriate distance in all directions utilizing iris scissors.
O-Z Plasty Text: The defect edges were debeveled with a #15 scalpel blade.  Given the location of the defect, shape of the defect and the proximity to free margins an O-Z plasty (double transposition flap) was deemed most appropriate.  Using a sterile surgical marker, the appropriate transposition flaps were drawn incorporating the defect and placing the expected incisions within the relaxed skin tension lines where possible.    The area thus outlined was incised deep to adipose tissue with a #15 scalpel blade.  The skin margins were undermined to an appropriate distance in all directions utilizing iris scissors.  Hemostasis was achieved with electrocautery.  The flaps were then transposed into place, one clockwise and the other counterclockwise, and anchored with interrupted buried subcutaneous sutures.
Double O-Z Plasty Text: The defect edges were debeveled with a #15 scalpel blade.  Given the location of the defect, shape of the defect and the proximity to free margins a Double O-Z plasty (double transposition flap) was deemed most appropriate.  Using a sterile surgical marker, the appropriate transposition flaps were drawn incorporating the defect and placing the expected incisions within the relaxed skin tension lines where possible. The area thus outlined was incised deep to adipose tissue with a #15 scalpel blade.  The skin margins were undermined to an appropriate distance in all directions utilizing iris scissors.  Hemostasis was achieved with electrocautery.  The flaps were then transposed into place, one clockwise and the other counterclockwise, and anchored with interrupted buried subcutaneous sutures.
V-Y Plasty Text: The defect edges were debeveled with a #15 scalpel blade.  Given the location of the defect, shape of the defect and the proximity to free margins an V-Y advancement flap was deemed most appropriate.  Using a sterile surgical marker, an appropriate advancement flap was drawn incorporating the defect and placing the expected incisions within the relaxed skin tension lines where possible.    The area thus outlined was incised deep to adipose tissue with a #15 scalpel blade.  The skin margins were undermined to an appropriate distance in all directions utilizing iris scissors.
H Plasty Text: Given the location of the defect, shape of the defect and the proximity to free margins a H-plasty was deemed most appropriate for repair.  Using a sterile surgical marker, the appropriate advancement arms of the H-plasty were drawn incorporating the defect and placing the expected incisions within the relaxed skin tension lines where possible. The area thus outlined was incised deep to adipose tissue with a #15 scalpel blade. The skin margins were undermined to an appropriate distance in all directions utilizing iris scissors.  The opposing advancement arms were then advanced into place in opposite direction and anchored with interrupted buried subcutaneous sutures.
W Plasty Text: The lesion was extirpated to the level of the fat with a #15 scalpel blade.  Given the location of the defect, shape of the defect and the proximity to free margins a W-plasty was deemed most appropriate for repair.  Using a sterile surgical marker, the appropriate transposition arms of the W-plasty were drawn incorporating the defect and placing the expected incisions within the relaxed skin tension lines where possible.    The area thus outlined was incised deep to adipose tissue with a #15 scalpel blade.  The skin margins were undermined to an appropriate distance in all directions utilizing iris scissors.  The opposing transposition arms were then transposed into place in opposite direction and anchored with interrupted buried subcutaneous sutures.
Z Plasty Text: The lesion was extirpated to the level of the fat with a #15 scalpel blade.  Given the location of the defect, shape of the defect and the proximity to free margins a Z-plasty was deemed most appropriate for repair.  Using a sterile surgical marker, the appropriate transposition arms of the Z-plasty were drawn incorporating the defect and placing the expected incisions within the relaxed skin tension lines where possible.    The area thus outlined was incised deep to adipose tissue with a #15 scalpel blade.  The skin margins were undermined to an appropriate distance in all directions utilizing iris scissors.  The opposing transposition arms were then transposed into place in opposite direction and anchored with interrupted buried subcutaneous sutures.
Zygomaticofacial Flap Text: Given the location of the defect, shape of the defect and the proximity to free margins a zygomaticofacial flap was deemed most appropriate for repair.  Using a sterile surgical marker, the appropriate flap was drawn incorporating the defect and placing the expected incisions within the relaxed skin tension lines where possible. The area thus outlined was incised deep to adipose tissue with a #15 scalpel blade with preservation of a vascular pedicle.  The skin margins were undermined to an appropriate distance in all directions utilizing iris scissors.  The flap was then placed into the defect and anchored with interrupted buried subcutaneous sutures.
Cheek Interpolation Flap Text: A decision was made to reconstruct the defect utilizing an interpolation axial flap and a staged reconstruction.  A telfa template was made of the defect.  This telfa template was then used to outline the Cheek Interpolation flap.  The donor area for the pedicle flap was then injected with anesthesia.  The flap was excised through the skin and subcutaneous tissue down to the layer of the underlying musculature.  The interpolation flap was carefully excised within this deep plane to maintain its blood supply.  The edges of the donor site were undermined.   The donor site was closed in a primary fashion.  The pedicle was then rotated into position and sutured.  Once the tube was sutured into place, adequate blood supply was confirmed with blanching and refill.  The pedicle was then wrapped with xeroform gauze and dressed appropriately with a telfa and gauze bandage to ensure continued blood supply and protect the attached pedicle.
Cheek-To-Nose Interpolation Flap Text: A decision was made to reconstruct the defect utilizing an interpolation axial flap and a staged reconstruction.  A telfa template was made of the defect.  This telfa template was then used to outline the Cheek-To-Nose Interpolation flap.  The donor area for the pedicle flap was then injected with anesthesia.  The flap was excised through the skin and subcutaneous tissue down to the layer of the underlying musculature.  The interpolation flap was carefully excised within this deep plane to maintain its blood supply.  The edges of the donor site were undermined.   The donor site was closed in a primary fashion.  The pedicle was then rotated into position and sutured.  Once the tube was sutured into place, adequate blood supply was confirmed with blanching and refill.  The pedicle was then wrapped with xeroform gauze and dressed appropriately with a telfa and gauze bandage to ensure continued blood supply and protect the attached pedicle.
Interpolation Flap Text: A decision was made to reconstruct the defect utilizing an interpolation axial flap and a staged reconstruction.  A telfa template was made of the defect.  This telfa template was then used to outline the interpolation flap.  The donor area for the pedicle flap was then injected with anesthesia.  The flap was excised through the skin and subcutaneous tissue down to the layer of the underlying musculature.  The interpolation flap was carefully excised within this deep plane to maintain its blood supply.  The edges of the donor site were undermined.   The donor site was closed in a primary fashion.  The pedicle was then rotated into position and sutured.  Once the tube was sutured into place, adequate blood supply was confirmed with blanching and refill.  The pedicle was then wrapped with xeroform gauze and dressed appropriately with a telfa and gauze bandage to ensure continued blood supply and protect the attached pedicle.
Melolabial Interpolation Flap Text: A decision was made to reconstruct the defect utilizing an interpolation axial flap and a staged reconstruction.  A telfa template was made of the defect.  This telfa template was then used to outline the melolabial interpolation flap.  The donor area for the pedicle flap was then injected with anesthesia.  The flap was excised through the skin and subcutaneous tissue down to the layer of the underlying musculature.  The pedicle flap was carefully excised within this deep plane to maintain its blood supply.  The edges of the donor site were undermined.   The donor site was closed in a primary fashion.  The pedicle was then rotated into position and sutured.  Once the tube was sutured into place, adequate blood supply was confirmed with blanching and refill.  The pedicle was then wrapped with xeroform gauze and dressed appropriately with a telfa and gauze bandage to ensure continued blood supply and protect the attached pedicle.
Mastoid Interpolation Flap Text: A decision was made to reconstruct the defect utilizing an interpolation axial flap and a staged reconstruction.  A telfa template was made of the defect.  This telfa template was then used to outline the mastoid interpolation flap.  The donor area for the pedicle flap was then injected with anesthesia.  The flap was excised through the skin and subcutaneous tissue down to the layer of the underlying musculature.  The pedicle flap was carefully excised within this deep plane to maintain its blood supply.  The edges of the donor site were undermined.   The donor site was closed in a primary fashion.  The pedicle was then rotated into position and sutured.  Once the tube was sutured into place, adequate blood supply was confirmed with blanching and refill.  The pedicle was then wrapped with xeroform gauze and dressed appropriately with a telfa and gauze bandage to ensure continued blood supply and protect the attached pedicle.
Posterior Auricular Interpolation Flap Text: A decision was made to reconstruct the defect utilizing an interpolation axial flap and a staged reconstruction.  A telfa template was made of the defect.  This telfa template was then used to outline the posterior auricular interpolation flap.  The donor area for the pedicle flap was then injected with anesthesia.  The flap was excised through the skin and subcutaneous tissue down to the layer of the underlying musculature.  The pedicle flap was carefully excised within this deep plane to maintain its blood supply.  The edges of the donor site were undermined.   The donor site was closed in a primary fashion.  The pedicle was then rotated into position and sutured.  Once the tube was sutured into place, adequate blood supply was confirmed with blanching and refill.  The pedicle was then wrapped with xeroform gauze and dressed appropriately with a telfa and gauze bandage to ensure continued blood supply and protect the attached pedicle.
Paramedian Forehead Flap Text: A decision was made to reconstruct the defect utilizing an interpolation axial flap and a staged reconstruction.  A telfa template was made of the defect.  This telfa template was then used to outline the paramedian forehead pedicle flap.  The donor area for the pedicle flap was then injected with anesthesia.  The flap was excised through the skin and subcutaneous tissue down to the layer of the underlying musculature.  The pedicle flap was carefully excised within this deep plane to maintain its blood supply.  The edges of the donor site were undermined.   The donor site was closed in a primary fashion.  The pedicle was then rotated into position and sutured.  Once the tube was sutured into place, adequate blood supply was confirmed with blanching and refill.  The pedicle was then wrapped with xeroform gauze and dressed appropriately with a telfa and gauze bandage to ensure continued blood supply and protect the attached pedicle.
Lip Wedge Excision Repair Text: Given the location of the defect and the proximity to free margins a full thickness wedge repair was deemed most appropriate.  Using a sterile surgical marker, the appropriate repair was drawn incorporating the defect and placing the expected incisions perpendicular to the vermilion border.  The vermilion border was also meticulously outlined to ensure appropriate reapproximation during the repair.  The area thus outlined was incised through and through with a #15 scalpel blade.  The muscularis and dermis were reaproximated with deep sutures following hemostasis. Care was taken to realign the vermilion border before proceeding with the superficial closure.  Once the vermilion was realigned the superfical and mucosal closure was finished.
Ftsg Text: The defect edges were debeveled with a #15 scalpel blade.  Given the location of the defect, shape of the defect and the proximity to free margins a full thickness skin graft was deemed most appropriate.  Using a sterile surgical marker, the primary defect shape was transferred to the donor site. The area thus outlined was incised deep to adipose tissue with a #15 scalpel blade.  The harvested graft was then trimmed of adipose tissue until only dermis and epidermis was left.  The skin margins of the secondary defect were undermined to an appropriate distance in all directions utilizing iris scissors.  The secondary defect was closed with interrupted buried subcutaneous sutures.  The skin edges were then re-apposed with running  sutures.  The skin graft was then placed in the primary defect and oriented appropriately.
Split-Thickness Skin Graft Text: The defect edges were debeveled with a #15 scalpel blade.  Given the location of the defect, shape of the defect and the proximity to free margins a split thickness skin graft was deemed most appropriate.  Using a sterile surgical marker, the primary defect shape was transferred to the donor site. The split thickness graft was then harvested.  The skin graft was then placed in the primary defect and oriented appropriately.
Burow's Graft Text: The defect edges were debeveled with a #15 scalpel blade.  Given the location of the defect, shape of the defect, the proximity to free margins and the presence of a standing cone deformity a Burow's skin graft was deemed most appropriate. The standing cone was removed and this tissue was then trimmed to the shape of the primary defect. The adipose tissue was also removed until only dermis and epidermis were left.  The skin margins of the secondary defect were undermined to an appropriate distance in all directions utilizing iris scissors.  The secondary defect was closed with interrupted buried subcutaneous sutures.  The skin edges were then re-apposed with running  sutures.  The skin graft was then placed in the primary defect and oriented appropriately.
Cartilage Graft Text: The defect edges were debeveled with a #15 scalpel blade.  Given the location of the defect, shape of the defect, the fact the defect involved a full thickness cartilage defect a cartilage graft was deemed most appropriate.  An appropriate donor site was identified, cleansed, and anesthetized. The cartilage graft was then harvested and transferred to the recipient site, oriented appropriately and then sutured into place.  The secondary defect was then repaired using a primary closure.
Composite Graft Text: The defect edges were debeveled with a #15 scalpel blade.  Given the location of the defect, shape of the defect, the proximity to free margins and the fact the defect was full thickness a composite graft was deemed most appropriate.  The defect was outline and then transferred to the donor site.  A full thickness graft was then excised from the donor site. The graft was then placed in the primary defect, oriented appropriately and then sutured into place.  The secondary defect was then repaired using a primary closure.
Epidermal Autograft Text: The defect edges were debeveled with a #15 scalpel blade.  Given the location of the defect, shape of the defect and the proximity to free margins an epidermal autograft was deemed most appropriate.  Using a sterile surgical marker, the primary defect shape was transferred to the donor site. The epidermal graft was then harvested.  The skin graft was then placed in the primary defect and oriented appropriately.
Dermal Autograft Text: The defect edges were debeveled with a #15 scalpel blade.  Given the location of the defect, shape of the defect and the proximity to free margins a dermal autograft was deemed most appropriate.  Using a sterile surgical marker, the primary defect shape was transferred to the donor site. The area thus outlined was incised deep to adipose tissue with a #15 scalpel blade.  The harvested graft was then trimmed of adipose and epidermal tissue until only dermis was left.  The skin graft was then placed in the primary defect and oriented appropriately.
Skin Substitute Text: The defect edges were debeveled with a #15 scalpel blade.  Given the location of the defect, shape of the defect and the proximity to free margins a skin substitute graft was deemed most appropriate.  The graft material was trimmed to fit the size of the defect. The graft was then placed in the primary defect and oriented appropriately.
Tissue Cultured Epidermal Autograft Text: The defect edges were debeveled with a #15 scalpel blade.  Given the location of the defect, shape of the defect and the proximity to free margins a tissue cultured epidermal autograft was deemed most appropriate.  The graft was then trimmed to fit the size of the defect.  The graft was then placed in the primary defect and oriented appropriately.
Xenograft Text: The defect edges were debeveled with a #15 scalpel blade.  Given the location of the defect, shape of the defect and the proximity to free margins a xenograft was deemed most appropriate.  The graft was then trimmed to fit the size of the defect.  The graft was then placed in the primary defect and oriented appropriately.
Purse String (Intermediate) Text: Given the location of the defect and the characteristics of the surrounding skin a purse string intermediate closure was deemed most appropriate.  Undermining was performed circumferentially around the surgical defect.  A purse string suture was then placed and tightened.
Purse String (Simple) Text: Given the location of the defect and the characteristics of the surrounding skin a purse string simple closure was deemed most appropriate.  Undermining was performed circumferentially around the surgical defect.  A purse string suture was then placed and tightened.
Complex Repair And Single Advancement Flap Text: The defect edges were debeveled with a #15 scalpel blade.  The primary defect was closed partially with a complex linear closure.  Given the location of the remaining defect, shape of the defect and the proximity to free margins a single advancement flap was deemed most appropriate for complete closure of the defect.  Using a sterile surgical marker, an appropriate advancement flap was drawn incorporating the defect and placing the expected incisions within the relaxed skin tension lines where possible.    The area thus outlined was incised deep to adipose tissue with a #15 scalpel blade.  The skin margins were undermined to an appropriate distance in all directions utilizing iris scissors.
Complex Repair And Double Advancement Flap Text: The defect edges were debeveled with a #15 scalpel blade.  The primary defect was closed partially with a complex linear closure.  Given the location of the remaining defect, shape of the defect and the proximity to free margins a double advancement flap was deemed most appropriate for complete closure of the defect.  Using a sterile surgical marker, an appropriate advancement flap was drawn incorporating the defect and placing the expected incisions within the relaxed skin tension lines where possible.    The area thus outlined was incised deep to adipose tissue with a #15 scalpel blade.  The skin margins were undermined to an appropriate distance in all directions utilizing iris scissors.
Complex Repair And Modified Advancement Flap Text: The defect edges were debeveled with a #15 scalpel blade.  The primary defect was closed partially with a complex linear closure.  Given the location of the remaining defect, shape of the defect and the proximity to free margins a modified advancement flap was deemed most appropriate for complete closure of the defect.  Using a sterile surgical marker, an appropriate advancement flap was drawn incorporating the defect and placing the expected incisions within the relaxed skin tension lines where possible.    The area thus outlined was incised deep to adipose tissue with a #15 scalpel blade.  The skin margins were undermined to an appropriate distance in all directions utilizing iris scissors.
Complex Repair And A-T Advancement Flap Text: The defect edges were debeveled with a #15 scalpel blade.  The primary defect was closed partially with a complex linear closure.  Given the location of the remaining defect, shape of the defect and the proximity to free margins an A-T advancement flap was deemed most appropriate for complete closure of the defect.  Using a sterile surgical marker, an appropriate advancement flap was drawn incorporating the defect and placing the expected incisions within the relaxed skin tension lines where possible.    The area thus outlined was incised deep to adipose tissue with a #15 scalpel blade.  The skin margins were undermined to an appropriate distance in all directions utilizing iris scissors.
Complex Repair And O-T Advancement Flap Text: The defect edges were debeveled with a #15 scalpel blade.  The primary defect was closed partially with a complex linear closure.  Given the location of the remaining defect, shape of the defect and the proximity to free margins an O-T advancement flap was deemed most appropriate for complete closure of the defect.  Using a sterile surgical marker, an appropriate advancement flap was drawn incorporating the defect and placing the expected incisions within the relaxed skin tension lines where possible.    The area thus outlined was incised deep to adipose tissue with a #15 scalpel blade.  The skin margins were undermined to an appropriate distance in all directions utilizing iris scissors.
Complex Repair And O-L Flap Text: The defect edges were debeveled with a #15 scalpel blade.  The primary defect was closed partially with a complex linear closure.  Given the location of the remaining defect, shape of the defect and the proximity to free margins an O-L flap was deemed most appropriate for complete closure of the defect.  Using a sterile surgical marker, an appropriate flap was drawn incorporating the defect and placing the expected incisions within the relaxed skin tension lines where possible.    The area thus outlined was incised deep to adipose tissue with a #15 scalpel blade.  The skin margins were undermined to an appropriate distance in all directions utilizing iris scissors.
Complex Repair And Bilobe Flap Text: The defect edges were debeveled with a #15 scalpel blade.  The primary defect was closed partially with a complex linear closure.  Given the location of the remaining defect, shape of the defect and the proximity to free margins a bilobe flap was deemed most appropriate for complete closure of the defect.  Using a sterile surgical marker, an appropriate advancement flap was drawn incorporating the defect and placing the expected incisions within the relaxed skin tension lines where possible.    The area thus outlined was incised deep to adipose tissue with a #15 scalpel blade.  The skin margins were undermined to an appropriate distance in all directions utilizing iris scissors.
Complex Repair And Melolabial Flap Text: The defect edges were debeveled with a #15 scalpel blade.  The primary defect was closed partially with a complex linear closure.  Given the location of the remaining defect, shape of the defect and the proximity to free margins a melolabial flap was deemed most appropriate for complete closure of the defect.  Using a sterile surgical marker, an appropriate advancement flap was drawn incorporating the defect and placing the expected incisions within the relaxed skin tension lines where possible.    The area thus outlined was incised deep to adipose tissue with a #15 scalpel blade.  The skin margins were undermined to an appropriate distance in all directions utilizing iris scissors.
Complex Repair And Rotation Flap Text: The defect edges were debeveled with a #15 scalpel blade.  The primary defect was closed partially with a complex linear closure.  Given the location of the remaining defect, shape of the defect and the proximity to free margins a rotation flap was deemed most appropriate for complete closure of the defect.  Using a sterile surgical marker, an appropriate advancement flap was drawn incorporating the defect and placing the expected incisions within the relaxed skin tension lines where possible.    The area thus outlined was incised deep to adipose tissue with a #15 scalpel blade.  The skin margins were undermined to an appropriate distance in all directions utilizing iris scissors.
Complex Repair And Rhombic Flap Text: The defect edges were debeveled with a #15 scalpel blade.  The primary defect was closed partially with a complex linear closure.  Given the location of the remaining defect, shape of the defect and the proximity to free margins a rhombic flap was deemed most appropriate for complete closure of the defect.  Using a sterile surgical marker, an appropriate advancement flap was drawn incorporating the defect and placing the expected incisions within the relaxed skin tension lines where possible.    The area thus outlined was incised deep to adipose tissue with a #15 scalpel blade.  The skin margins were undermined to an appropriate distance in all directions utilizing iris scissors.
Complex Repair And Transposition Flap Text: The defect edges were debeveled with a #15 scalpel blade.  The primary defect was closed partially with a complex linear closure.  Given the location of the remaining defect, shape of the defect and the proximity to free margins a transposition flap was deemed most appropriate for complete closure of the defect.  Using a sterile surgical marker, an appropriate advancement flap was drawn incorporating the defect and placing the expected incisions within the relaxed skin tension lines where possible.    The area thus outlined was incised deep to adipose tissue with a #15 scalpel blade.  The skin margins were undermined to an appropriate distance in all directions utilizing iris scissors.
Complex Repair And V-Y Plasty Text: The defect edges were debeveled with a #15 scalpel blade.  The primary defect was closed partially with a complex linear closure.  Given the location of the remaining defect, shape of the defect and the proximity to free margins a V-Y plasty was deemed most appropriate for complete closure of the defect.  Using a sterile surgical marker, an appropriate advancement flap was drawn incorporating the defect and placing the expected incisions within the relaxed skin tension lines where possible.    The area thus outlined was incised deep to adipose tissue with a #15 scalpel blade.  The skin margins were undermined to an appropriate distance in all directions utilizing iris scissors.
Complex Repair And M Plasty Text: The defect edges were debeveled with a #15 scalpel blade.  The primary defect was closed partially with a complex linear closure.  Given the location of the remaining defect, shape of the defect and the proximity to free margins an M plasty was deemed most appropriate for complete closure of the defect.  Using a sterile surgical marker, an appropriate advancement flap was drawn incorporating the defect and placing the expected incisions within the relaxed skin tension lines where possible.    The area thus outlined was incised deep to adipose tissue with a #15 scalpel blade.  The skin margins were undermined to an appropriate distance in all directions utilizing iris scissors.
Complex Repair And Double M Plasty Text: The defect edges were debeveled with a #15 scalpel blade.  The primary defect was closed partially with a complex linear closure.  Given the location of the remaining defect, shape of the defect and the proximity to free margins a double M plasty was deemed most appropriate for complete closure of the defect.  Using a sterile surgical marker, an appropriate advancement flap was drawn incorporating the defect and placing the expected incisions within the relaxed skin tension lines where possible.    The area thus outlined was incised deep to adipose tissue with a #15 scalpel blade.  The skin margins were undermined to an appropriate distance in all directions utilizing iris scissors.
Complex Repair And W Plasty Text: The defect edges were debeveled with a #15 scalpel blade.  The primary defect was closed partially with a complex linear closure.  Given the location of the remaining defect, shape of the defect and the proximity to free margins a W plasty was deemed most appropriate for complete closure of the defect.  Using a sterile surgical marker, an appropriate advancement flap was drawn incorporating the defect and placing the expected incisions within the relaxed skin tension lines where possible.    The area thus outlined was incised deep to adipose tissue with a #15 scalpel blade.  The skin margins were undermined to an appropriate distance in all directions utilizing iris scissors.
Complex Repair And Z Plasty Text: The defect edges were debeveled with a #15 scalpel blade.  The primary defect was closed partially with a complex linear closure.  Given the location of the remaining defect, shape of the defect and the proximity to free margins a Z plasty was deemed most appropriate for complete closure of the defect.  Using a sterile surgical marker, an appropriate advancement flap was drawn incorporating the defect and placing the expected incisions within the relaxed skin tension lines where possible.    The area thus outlined was incised deep to adipose tissue with a #15 scalpel blade.  The skin margins were undermined to an appropriate distance in all directions utilizing iris scissors.
Complex Repair And Dorsal Nasal Flap Text: The defect edges were debeveled with a #15 scalpel blade.  The primary defect was closed partially with a complex linear closure.  Given the location of the remaining defect, shape of the defect and the proximity to free margins a dorsal nasal flap was deemed most appropriate for complete closure of the defect.  Using a sterile surgical marker, an appropriate flap was drawn incorporating the defect and placing the expected incisions within the relaxed skin tension lines where possible.    The area thus outlined was incised deep to adipose tissue with a #15 scalpel blade.  The skin margins were undermined to an appropriate distance in all directions utilizing iris scissors.
Complex Repair And Ftsg Text: The defect edges were debeveled with a #15 scalpel blade.  The primary defect was closed partially with a complex linear closure.  Given the location of the defect, shape of the defect and the proximity to free margins a full thickness skin graft was deemed most appropriate to repair the remaining defect.  The graft was trimmed to fit the size of the remaining defect.  The graft was then placed in the primary defect, oriented appropriately, and sutured into place.
Complex Repair And Burow's Graft Text: The defect edges were debeveled with a #15 scalpel blade.  The primary defect was closed partially with a complex linear closure.  Given the location of the defect, shape of the defect, the proximity to free margins and the presence of a standing cone deformity a Burow's graft was deemed most appropriate to repair the remaining defect.  The graft was trimmed to fit the size of the remaining defect.  The graft was then placed in the primary defect, oriented appropriately, and sutured into place.
Complex Repair And Split-Thickness Skin Graft Text: The defect edges were debeveled with a #15 scalpel blade.  The primary defect was closed partially with a complex linear closure.  Given the location of the defect, shape of the defect and the proximity to free margins a split thickness skin graft was deemed most appropriate to repair the remaining defect.  The graft was trimmed to fit the size of the remaining defect.  The graft was then placed in the primary defect, oriented appropriately, and sutured into place.
Complex Repair And Epidermal Autograft Text: The defect edges were debeveled with a #15 scalpel blade.  The primary defect was closed partially with a complex linear closure.  Given the location of the defect, shape of the defect and the proximity to free margins an epidermal autograft was deemed most appropriate to repair the remaining defect.  The graft was trimmed to fit the size of the remaining defect.  The graft was then placed in the primary defect, oriented appropriately, and sutured into place.
Complex Repair And Dermal Autograft Text: The defect edges were debeveled with a #15 scalpel blade.  The primary defect was closed partially with a complex linear closure.  Given the location of the defect, shape of the defect and the proximity to free margins an dermal autograft was deemed most appropriate to repair the remaining defect.  The graft was trimmed to fit the size of the remaining defect.  The graft was then placed in the primary defect, oriented appropriately, and sutured into place.
Complex Repair And Tissue Cultured Epidermal Autograft Text: The defect edges were debeveled with a #15 scalpel blade.  The primary defect was closed partially with a complex linear closure.  Given the location of the defect, shape of the defect and the proximity to free margins an tissue cultured epidermal autograft was deemed most appropriate to repair the remaining defect.  The graft was trimmed to fit the size of the remaining defect.  The graft was then placed in the primary defect, oriented appropriately, and sutured into place.
Complex Repair And Xenograft Text: The defect edges were debeveled with a #15 scalpel blade.  The primary defect was closed partially with a complex linear closure.  Given the location of the defect, shape of the defect and the proximity to free margins a xenograft was deemed most appropriate to repair the remaining defect.  The graft was trimmed to fit the size of the remaining defect.  The graft was then placed in the primary defect, oriented appropriately, and sutured into place.
Complex Repair And Skin Substitute Graft Text: The defect edges were debeveled with a #15 scalpel blade.  The primary defect was closed partially with a complex linear closure.  Given the location of the remaining defect, shape of the defect and the proximity to free margins a skin substitute graft was deemed most appropriate to repair the remaining defect.  The graft was trimmed to fit the size of the remaining defect.  The graft was then placed in the primary defect, oriented appropriately, and sutured into place.
Path Notes (To The Dermatopathologist): Please check margins.
Consent was obtained from the patient. The risks and benefits to therapy were discussed in detail. Specifically, the risks of infection, scarring, bleeding, prolonged wound healing, incomplete removal, allergy to anesthesia, nerve injury and recurrence were addressed. Prior to the procedure, the treatment site was clearly identified and confirmed by the patient. All components of Universal Protocol/PAUSE Rule completed.
Post-Care Instructions: I reviewed with the patient in detail post-care instructions:\\n1. Apply bacitracin over the steri-strips.  \\n2. Cut non-stick pad (Telfa) to cover the steri-strips\\n3. Apply tape (hypafix) over the non-stick pad\\n4. Change once per day for 5 days\\n5. Shower with bandage on, change bandage after shower\\n\\nPatient is not to engage in any heavy lifting, exercise, hot tub, or swimming for the next 14 days. Should the patient develop any fevers, chills, bleeding, severe pain patient will contact the office immediately.
Home Suture Removal Text: Patient was provided a home suture removal kit and will remove their sutures at home.  If they have any questions or difficulties they will call the office.
Where Do You Want The Question To Include Opioid Counseling Located?: Case Summary Tab
Billing Type: Third-Party Bill
Information: Selecting Yes will display possible errors in your note based on the variables you have selected. This validation is only offered as a suggestion for you. PLEASE NOTE THAT THE VALIDATION TEXT WILL BE REMOVED WHEN YOU FINALIZE YOUR NOTE. IF YOU WANT TO FAX A PRELIMINARY NOTE YOU WILL NEED TO TOGGLE THIS TO 'NO' IF YOU DO NOT WANT IT IN YOUR FAXED NOTE.

## 2021-08-09 ENCOUNTER — APPOINTMENT (OUTPATIENT)
Dept: MEDICAL GROUP | Facility: PHYSICIAN GROUP | Age: 86
End: 2021-08-09
Payer: MEDICARE

## 2021-08-13 ENCOUNTER — HOSPITAL ENCOUNTER (OUTPATIENT)
Dept: RADIOLOGY | Facility: MEDICAL CENTER | Age: 86
End: 2021-08-13
Attending: PHYSICAL MEDICINE & REHABILITATION
Payer: MEDICARE

## 2021-08-13 ENCOUNTER — OFFICE VISIT (OUTPATIENT)
Dept: MEDICAL GROUP | Facility: PHYSICIAN GROUP | Age: 86
End: 2021-08-13
Payer: MEDICARE

## 2021-08-13 VITALS
WEIGHT: 168 LBS | SYSTOLIC BLOOD PRESSURE: 128 MMHG | RESPIRATION RATE: 16 BRPM | DIASTOLIC BLOOD PRESSURE: 78 MMHG | OXYGEN SATURATION: 97 % | BODY MASS INDEX: 28.68 KG/M2 | HEART RATE: 65 BPM | TEMPERATURE: 98.6 F | HEIGHT: 64 IN

## 2021-08-13 DIAGNOSIS — M81.0 OSTEOPOROSIS, UNSPECIFIED OSTEOPOROSIS TYPE, UNSPECIFIED PATHOLOGICAL FRACTURE PRESENCE: Chronic | ICD-10-CM

## 2021-08-13 DIAGNOSIS — K21.9 GASTROESOPHAGEAL REFLUX DISEASE WITHOUT ESOPHAGITIS: ICD-10-CM

## 2021-08-13 DIAGNOSIS — B35.1 ONYCHOMYCOSIS: ICD-10-CM

## 2021-08-13 DIAGNOSIS — G89.29 CHRONIC MIDLINE LOW BACK PAIN WITHOUT SCIATICA: ICD-10-CM

## 2021-08-13 DIAGNOSIS — Z00.00 MEDICARE ANNUAL WELLNESS VISIT, INITIAL: Primary | ICD-10-CM

## 2021-08-13 DIAGNOSIS — I82.5Y2 CHRONIC DEEP VEIN THROMBOSIS (DVT) OF PROXIMAL VEIN OF LEFT LOWER EXTREMITY (HCC): Chronic | ICD-10-CM

## 2021-08-13 DIAGNOSIS — I10 ESSENTIAL HYPERTENSION, BENIGN: ICD-10-CM

## 2021-08-13 DIAGNOSIS — M05.79 RHEUMATOID ARTHRITIS INVOLVING MULTIPLE SITES WITH POSITIVE RHEUMATOID FACTOR (HCC): Chronic | ICD-10-CM

## 2021-08-13 DIAGNOSIS — E78.5 HYPERLIPIDEMIA, UNSPECIFIED HYPERLIPIDEMIA TYPE: Chronic | ICD-10-CM

## 2021-08-13 DIAGNOSIS — I48.91 ATRIAL FIBRILLATION, UNSPECIFIED TYPE (HCC): Chronic | ICD-10-CM

## 2021-08-13 DIAGNOSIS — I35.0 AORTIC VALVE STENOSIS, ETIOLOGY OF CARDIAC VALVE DISEASE UNSPECIFIED: ICD-10-CM

## 2021-08-13 DIAGNOSIS — M25.551 RIGHT HIP PAIN: ICD-10-CM

## 2021-08-13 DIAGNOSIS — M54.50 CHRONIC MIDLINE LOW BACK PAIN WITHOUT SCIATICA: ICD-10-CM

## 2021-08-13 PROBLEM — R73.01 IMPAIRED FASTING GLUCOSE: Status: RESOLVED | Noted: 2017-11-02 | Resolved: 2021-08-13

## 2021-08-13 PROBLEM — M79.605 LEFT LEG PAIN: Status: RESOLVED | Noted: 2018-08-29 | Resolved: 2021-08-13

## 2021-08-13 PROBLEM — S89.91XA RIGHT LEG INJURY: Status: RESOLVED | Noted: 2021-04-12 | Resolved: 2021-08-13

## 2021-08-13 PROBLEM — R32 URINARY INCONTINENCE: Status: RESOLVED | Noted: 2017-11-02 | Resolved: 2021-08-13

## 2021-08-13 PROBLEM — R30.0 DYSURIA: Status: RESOLVED | Noted: 2021-07-02 | Resolved: 2021-08-13

## 2021-08-13 PROCEDURE — G0439 PPPS, SUBSEQ VISIT: HCPCS | Performed by: NURSE PRACTITIONER

## 2021-08-13 PROCEDURE — 73502 X-RAY EXAM HIP UNI 2-3 VIEWS: CPT | Mod: RT

## 2021-08-13 PROCEDURE — 8041 PR SCP AHA: Performed by: NURSE PRACTITIONER

## 2021-08-13 ASSESSMENT — PATIENT HEALTH QUESTIONNAIRE - PHQ9: CLINICAL INTERPRETATION OF PHQ2 SCORE: 0

## 2021-08-13 ASSESSMENT — ACTIVITIES OF DAILY LIVING (ADL): BATHING_REQUIRES_ASSISTANCE: 0

## 2021-08-13 ASSESSMENT — ENCOUNTER SYMPTOMS: GENERAL WELL-BEING: GOOD

## 2021-08-13 ASSESSMENT — FIBROSIS 4 INDEX: FIB4 SCORE: 2.64

## 2021-08-13 NOTE — ASSESSMENT & PLAN NOTE
Chronic medical problem.  She is on Xarelto.  She is managed by [facility].  She states that she does not have a history of atrial fibrillation.  We will request records.

## 2021-08-13 NOTE — LETTER
[facility]  CRISTINO Horner  [address]  [place] [address]  Fax: [phone]   Authorization for Release/Disclosure of   Protected Health Information   Name: SHAILESH BYRD : 1935 SSN: [id number]   Address: [address] [place] [address]  [address]  [place] [address] Phone:    [phone] (home)    I authorize the entity listed below to release/disclose the PHI below to:   [facility]/CRISTINO Horner and CRISTINO Horner   Provider or Entity Name:  [facility]    Address   City, [facility], [facility]   Phone:      Fax:     Reason for request: continuity of care   Information to be released:    [  ] LAST COLONOSCOPY,  including any PATH REPORT and follow-up  [  ] LAST FIT/COLOGUARD RESULT [  ] LAST DEXA  [  ] LAST MAMMOGRAM  [  ] LAST PAP  [  ] LAST LABS [  ] RETINA EXAM REPORT  [  ] IMMUNIZATION RECORDS  [x] Release last year progress notes and labs      [  ] Check here and initial the line next to each item to release ALL health information INCLUDING  _____ Care and treatment for drug and / or alcohol abuse  _____ HIV testing, infection status, or AIDS  _____ Genetic Testing    DATES OF SERVICE OR TIME PERIOD TO BE DISCLOSED: _____________  I understand and acknowledge that:  * This Authorization may be revoked at any time by you in writing, except if your health information has already been used or disclosed.  * Your health information that will be used or disclosed as a result of you signing this authorization could be re-disclosed by the recipient. If this occurs, your re-disclosed health information may no longer be protected by State or Federal laws.  * You may refuse to sign this Authorization. Your refusal will not affect your ability to obtain treatment.  * This Authorization becomes effective upon signing and will  on (date) __________.      If no date is indicated, this Authorization will  one (1) year from the signature date.    Name:  Marry Tubbs Kareen    Signature:   Date:     8/13/2021       PLEASE FAX REQUESTED RECORDS BACK TO: [phone]

## 2021-08-13 NOTE — ASSESSMENT & PLAN NOTE
Chronic medical problem.  She was recently started on gabapentin by rheumatology. She is taking plaquenil.  She is followed by rheumatology.

## 2021-08-13 NOTE — ASSESSMENT & PLAN NOTE
Chronic medical problem.  She is taking atorvastatin 10 mg every evening. She has been working on losing weight. She has been able to lose 10 pounds with Tops Take Off Pounds Sensibly.

## 2021-08-13 NOTE — PROGRESS NOTES
Chief Complaint   Patient presents with   • Annual Wellness Visit         HPI:  Marry is a 85 y.o. here for Medicare Annual Wellness Visit    Hyperlipidemia  Chronic medical problem.  She is taking atorvastatin 10 mg every evening. She has been working on losing weight. She has been able to lose 10 pounds with Tops Take Off Pounds Sensibly.     Rheumatoid arthritis involving multiple sites with positive rheumatoid factor (HCC)  Chronic medical problem.  She was recently started on gabapentin by rheumatology. She is taking plaquenil.  She is followed by rheumatology.    Back pain  Chronic medical problem.  She is followed by Dr. Cooper. She had recent nerve test completed. She continues with her lidoderm and Lyrica.  She states that she would prefer not to have surgery.    GERD (gastroesophageal reflux disease)  Chronic medical problem. She is taking omeprazole 20 mg daily.  No heartburn or indigestion.    Atrial fibrillation [I48.91]  Chronic medical problem.  She is on Xarelto.  She is managed by [facility].  She states that she does not have a history of atrial fibrillation.  We will request records.    Essential hypertension, benign  Chronic medical problem.  Her blood pressure is at goal.  She is not on any blood pressure medication.    Onychomycosis  Chronic medical problem.  She has completed her terbinafine course.  She states that the ends of her nail still look the same.  She would like to discuss if she can restart the medication again.    Osteoporosis  Chronic medical problem.  She is followed by rheumatology.  She is on Prolia, calcium and vitamin D supplementation.    Aortic stenosis  Chronic medical problem.  She continues follow-up with Dr. Antunez with [facility].  She did have echo on 4/3/2021.  Results:  CONCLUSIONS  Compared to the images of the study done 04- there has been no   significant change.  Left ventricular ejection fraction is visually estimated to be  65%.  Moderate aortic stenosis.  Unable to estimate pulmonary artery pressure due to an inadequate   tricuspid regurgitant jet    Deep vein thrombosis (HCC)  Chronic medical problem.  She is currently on Xarelto and managed by cardiology.        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   • History of colon cancer 10/30/2018   • Aortic stenosis 11/02/2017   • Malignant melanoma of skin of left leg (HCC) 10/02/2017   • Rheumatoid nodule (HCC) 07/25/2017   • Atrial fibrillation [I48.91] 04/19/2016   • Rheumatoid arthritis involving multiple sites with positive rheumatoid factor (HCC) 03/14/2016   • Deep vein thrombosis (HCC) 03/08/2016   • Right bundle branch block 06/27/2012   • Essential hypertension, benign 06/27/2012   • Hyperlipidemia 06/27/2012   • GERD (gastroesophageal reflux disease) 06/27/2012   • Back pain 06/27/2012       Current Outpatient Medications   Medication Sig Dispense Refill   • gabapentin (NEURONTIN) 100 MG Cap 1 tab po qhs 90 capsule 0   • hydroxychloroquine (PLAQUENIL) 200 MG Tab Take 1 tablet by mouth 2 times a day. 180 tablet 1   • omeprazole (PRILOSEC) 20 MG delayed-release capsule Take 1 capsule by mouth every day. 90 capsule 1   • pregabalin (LYRICA) 150 MG Cap      • lidocaine (LIDODERM) 5 % Patch APPLY ONE PATCH TO CLEAN DRY SKIN AS DIRECTED DAILY 90 Patch 1   • atorvastatin (LIPITOR) 10 MG Tab Take 1 tablet by mouth every evening. 90 tablet 3   • rivaroxaban (XARELTO) 20 MG Tab tablet Take 1 tablet by mouth with dinner. 90 tablet 1   • Potassium 99 MG Tab Take 99 mg by mouth every day.     • Diphenhydramine-APAP, sleep, (TYLENOL PM EXTRA STRENGTH)  MG Tab Take 2 Tabs by mouth every bedtime.     • acetaminophen (TYLENOL) 500 MG Tab Take 1,000 mg by mouth 2 Times a Day.     • Melatonin 10 MG Tab  Take 1 Tab by mouth every bedtime.     • vitamin D (CHOLECALCIFEROL) 1000 UNIT Tab Take 1,000 Units by mouth every morning.     • Calcium Carbonate-Vitamin D (CALCIUM + D PO) Take 1 Tab by mouth every bedtime.       Current Facility-Administered Medications   Medication Dose Route Frequency Provider Last Rate Last Admin   • denosumab (PROLIA) subq injection 60 mg  60 mg Subcutaneous Q6 MO Deanna Escoto M.D.   60 mg at 06/21/21 1432        Patient is taking medications as noted in medication list.  Current supplements as per medication list.     Allergies: Sulfa drugs    Current social contact/activities: She participates in weekly weight loss weigh in program.      Is patient current with immunizations? No, due for SHINGRIX (Shingles). Patient is interested in receiving NONE today.    She  reports that she has never smoked. She has never used smokeless tobacco. She reports previous alcohol use. She reports that she does not use drugs.  Counseling given: Not Answered        DPA/Advanced directive: Patient has Advanced Directive on file.     ROS:    Gait: Uses a cane   Ostomy: No   Other tubes: No   Amputations: No   Chronic oxygen use No   Last eye exam 9/25/2020   Wears hearing aids: No   : Reports urinary leakage during the last 6 months that has somewhat interfered with their daily activities or sleep.      Screening:    Annual Health Assessment Questions:    1.  Are you currently engaging in any exercise or physical activity? Yes    2.  How would you describe your mood or emotional well-being today? good    3.  Have you had any falls in the last year? No    4.  Have you noticed any problems with your balance or had difficulty walking? No    5.  In the last six months have you experienced any leakage of urine? Yes    6. DPA/Advanced Directive: Patient has Advanced Directive on file.     Depression Screening    Little interest or pleasure in doing things?  0 - not at all  Feeling down, depressed, or  hopeless? 0 - not at all  Patient Health Questionnaire Score: 0    If depressive symptoms identified deferred to follow up visit unless specifically addressed in assessment and plan.    Interpretation of PHQ-9 Total Score   Score Severity   1-4 No Depression   5-9 Mild Depression   10-14 Moderate Depression   15-19 Moderately Severe Depression   20-27 Severe Depression    Screening for Cognitive Impairment    Three Minute Recall (captain, garden, picture)  2/3    Antonino clock face with all 12 numbers and set the hands to show 5 past 8.  Yes    If cognitive concerns identified, deferred for follow up unless specifically addressed in assessment and plan.    Fall Risk Assessment    Has the patient had two or more falls in the last year or any fall with injury in the last year?  No  If fall risk identified, deferred for follow up unless specifically addressed in assessment and plan.    Safety Assessment    Throw rugs on floor.  Yes  Handrails on all stairs.  No  Good lighting in all hallways.  Yes  Difficulty hearing.  No  Patient counseled about all safety risks that were identified.    Functional Assessment ADLs    Are there any barriers preventing you from cooking for yourself or meeting nutritional needs?  No.    Are there any barriers preventing you from driving safely or obtaining transportation?  No.    Are there any barriers preventing you from using a telephone or calling for help?  No.    Are there any barriers preventing you from shopping?  No.    Are there any barriers preventing you from taking care of your own finances?  No.    Are there any barriers preventing you from managing your medications?  No.    Are there any barriers preventing you from showering, bathing or dressing yourself?  No.    Are you currently engaging in any exercise or physical activity?  Yes.     What is your perception of your health?  Good.    Health Maintenance Summary                IMM ZOSTER VACCINES Overdue 11/15/2012      Done  9/20/2012 Imm Admin: Zoster Vaccine Live (ZVL) (Zostavax) - HISTORICAL DATA    IMM INFLUENZA Next Due 9/1/2021      Done 10/27/2020 Imm Admin: Influenza Vaccine Quad Inj (Pf)     Patient has more history with this topic...    MAMMOGRAM Next Due 4/12/2022      Done 4/12/2021 MA-SCREENING MAMMO BILAT W/TOMOSYNTHESIS W/CAD     Patient has more history with this topic...    IMM DTaP/Tdap/Td Vaccine Next Due 5/15/2025      Done 5/15/2015 Imm Admin: Tdap Vaccine    BONE DENSITY Next Due 6/2/2025      Done 6/2/2020 DS-BONE DENSITY STUDY (DEXA)     Patient has more history with this topic...          Patient Care Team:  CRISTINO Horner as PCP - General (Nurse Practitioner)  Deanna Escoto M.D. as Consulting Physician (Rheumatology)  Chris Cooper M.D. (Phys Med and Rehab)  Allen Sherman M.D. (Gastroenterology)  Ramón Olivera M.D. (Cardiology)  Dash Bhagat M.D. (Surgery)  Candelario Cross M.D. (Orthopaedics)  Thor Savage M.D. as Consulting Physician (Ophthalmology)    Social History     Tobacco Use   • Smoking status: Never Smoker   • Smokeless tobacco: Never Used   Vaping Use   • Vaping Use: Never used   Substance Use Topics   • Alcohol use: Not Currently     Alcohol/week: 0.0 oz     Comment: occ   • Drug use: No     Family History   Problem Relation Age of Onset   • Cancer Mother         stomach   • Cancer Father         colon   • Leukemia Father         many exposure, worked for [employer]    • Heart Disease Brother         s/p stent      She  has a past medical history of Adenocarcinoma of colon (HCC) (10/30/2018), Anesthesia, Atrial fibrillation [I48.91] (4/19/2016), Back pain (6/27/2012), Blood clotting disorder (HCC) (2012), Bowel habit changes, Cancer (HCC), Cataract, Chronic back pain greater than 3 months duration, Deep vein thrombosis (HCC) (3/8/2016), Essential hypertension, benign (6/27/2012), GERD (gastroesophageal reflux disease) (6/27/2012), Heart murmur,  "Hyperlipidemia, Hypertension, Impaired fasting glucose (11/2/2017), Mild aortic stenosis (11/2/2017), Other and unspecified hyperlipidemia (6/27/2012), Prediabetes, Protein S deficiency (HCC) (11/2/2017), Rheumatoid arthritis involving multiple sites with positive rheumatoid factor (HCC) (03/14/2016), Rheumatoid nodule ([facility]) (7/25/2017), Right bundle branch block (6/27/2012), and Urinary incontinence (11/2/2017).   Past Surgical History:   Procedure Laterality Date   • LUMBAR MEDIAL BRANCH BLOCKS Bilateral 12/15/2020    Procedure: BLOCK, NERVE, SPINAL, LUMBAR, POSTERIOR RAMUS, MEDIAL BRANCH;  Surgeon: Chris Cooper M.D.;  [facility];  Service: Pain Management   • IRRIGATION & DEBRIDEMENT ORTHO Bilateral 7/31/2020    Procedure: IRRIGATION AND DEBRIDEMENT, WOUND - KNEE;  Surgeon: Roosevelt Saucedo M.D.;  Location: SURGERY [facility];  Service: Orthopedics   • HEMICOLECTOMY Right 12/13/2018    Procedure: OPEN RIGHT HEMICOLECTOMY;  Surgeon: Dash Bhagat M.D.;  Location: SURGERY [facility];  Service: General   • INGUINAL HERNIA REPAIR Right 9/23/2016    Procedure: INGUINAL HERNIA REPAIR - PRIMARY;  Surgeon: Mary Medina M.D.;  Location: SURGERY [facility];  Service:    • OTHER  08/12/2014    peroneal nerve surgery Dr. Castro    • OTHER ORTHOPEDIC SURGERY  2014    left knee partial   • OTHER ORTHOPEDIC SURGERY  2011    meniscus repair   • ARTHROPLASTY      partial TKA with Dr. Persaud   • CHOLECYSTECTOMY     • HYSTERECTOMY, TOTAL ABDOMINAL  1970's   • ROTATOR CUFF REPAIR Bilateral            Exam:   Vital signs reviewed  /78 (BP Location: Left arm, Patient Position: Sitting, BP Cuff Size: Adult)   Pulse 65   Temp 37 °C (98.6 °F) (Temporal)   Resp 16   Ht 1.626 m (5' 4\")   Wt 76.2 kg (168 lb)   SpO2 97%  Body mass index is 28.84 kg/m².    Hearing excellent.    Dentition good. She see's her dentist regularly.   Alert, oriented in no acute distress.  Eye " contact is good, speech goal directed, affect calm      Assessment and Plan. The following treatment and monitoring plan is recommended:      1. Medicare annual wellness visit, initial  Acute uncomplicated problem.  Annual wellness exam completed today.    2. Rheumatoid arthritis involving multiple sites with positive rheumatoid factor (HCC)  Chronic stable problem.  Recommend that she continue with her gabapentin and Plaquenil.  I recommend that she continue follow-up with rheumatology.    3. Hyperlipidemia, unspecified hyperlipidemia type  Chronic stable problem.  She will continue with her atorvastatin.  She is up-to-date on labs.    4. Aortic valve stenosis, etiology of cardiac valve disease unspecified  Chronic stable problem.  Reviewed her echocardiogram results from April 2021.  She will continue follow-up with cardiology.    5. Chronic deep vein thrombosis (DVT) of proximal vein of left lower extremity (HCC)  Chronic stable problem.  I recommend that she continue with her Xarelto and continue follow-up with cardiology.    6. Chronic midline low back pain without sciatica  Chronic stable problem.  Encouraged her to continue follow-up with Dr. Cooper and continue her Lidoderm and Lyrica.  PDMP reviewed today.    7. Gastroesophageal reflux disease without esophagitis  Chronic stable problem.  She will continue with her omeprazole.  No acute complaints today.    8. Atrial fibrillation, unspecified type (HCC)  Chronic stable problem.  We are requesting records from her cardiologist.  She will continue with her Xarelto and continue follow-up with cardiology.    9. Essential hypertension, benign  Chronic stable problem.  Blood pressure is at goal.  Continue to monitor.    10. Onychomycosis  Chronic stable problem.  Discussed with the patient that the base of her nails are clean and growing healthy.  Encouraged to continue to trim as the nail grows out and continue to monitor.  She verbalized understanding.    11.  Osteoporosis, unspecified osteoporosis type, unspecified pathological fracture presence  Chronic stable problem.  She will continue follow-up with rheumatology continue her Prolia, calcium, and vitamin D supplementation.  She is up-to-date her DEXA scan.      Services suggested: No services needed at this time  Health Care Screening recommendations as per orders if indicated.  Referrals offered: PT/OT/Nutrition counseling/Behavioral Health/Smoking cessation as per orders if indicated.    Discussion today about general wellness and lifestyle habits:    · Prevent falls and reduce trip hazards; Cautioned about securing or removing rugs.  · Have a working fire alarm and carbon monoxide detector;   · Engage in regular physical activity and social activities       Follow-up: Return in about 6 months (around 2/13/2022).       Please note that this dictation was created using voice recognition software. I have made every reasonable attempt to correct obvious errors, but I expect that there are errors of grammar and possibly content that I did not discover before finalizing the note.

## 2021-08-13 NOTE — ASSESSMENT & PLAN NOTE
Chronic medical problem.  She has completed her terbinafine course.  She states that the ends of her nail still look the same.  She would like to discuss if she can restart the medication again.

## 2021-08-13 NOTE — ASSESSMENT & PLAN NOTE
Chronic medical problem.  Her blood pressure is at goal.  She is not on any blood pressure medication.

## 2021-08-13 NOTE — ASSESSMENT & PLAN NOTE
Chronic medical problem.  She is followed by rheumatology.  She is on Prolia, calcium and vitamin D supplementation.

## 2021-08-13 NOTE — ASSESSMENT & PLAN NOTE
Chronic medical problem.  She is followed by Dr. Cooper. She had recent nerve test completed. She continues with her lidoderm and Lyrica.  She states that she would prefer not to have surgery.

## 2021-08-14 NOTE — ASSESSMENT & PLAN NOTE
Chronic medical problem.  She continues follow-up with Dr. Antunez with [facility].  She did have echo on 4/3/2021.  Results:  CONCLUSIONS  Compared to the images of the study done 04- there has been no   significant change.  Left ventricular ejection fraction is visually estimated to be 65%.  Moderate aortic stenosis.  Unable to estimate pulmonary artery pressure due to an inadequate   tricuspid regurgitant jet

## 2021-09-02 ENCOUNTER — TELEPHONE (OUTPATIENT)
Dept: RHEUMATOLOGY | Facility: MEDICAL CENTER | Age: 86
End: 2021-09-02

## 2021-09-02 DIAGNOSIS — G89.29 CHRONIC LOW BACK PAIN, UNSPECIFIED BACK PAIN LATERALITY, UNSPECIFIED WHETHER SCIATICA PRESENT: ICD-10-CM

## 2021-09-02 DIAGNOSIS — M54.50 CHRONIC LOW BACK PAIN, UNSPECIFIED BACK PAIN LATERALITY, UNSPECIFIED WHETHER SCIATICA PRESENT: ICD-10-CM

## 2021-09-02 DIAGNOSIS — M15.9 PRIMARY OSTEOARTHRITIS INVOLVING MULTIPLE JOINTS: ICD-10-CM

## 2021-09-02 RX ORDER — GABAPENTIN 300 MG/1
CAPSULE ORAL
Qty: 90 CAPSULE | Refills: 0 | Status: SHIPPED | OUTPATIENT
Start: 2021-09-02 | End: 2021-11-23 | Stop reason: SDUPTHER

## 2021-09-02 NOTE — TELEPHONE ENCOUNTER
Gabapentin Refill Request, recent my chart message about increasing dosage in chart. She is currently at 100mg   Last office visit 06/2021  Last labs 07/2021  [facility] pharmacy jac / parag  .  Thank you

## 2021-09-28 ENCOUNTER — HOSPITAL ENCOUNTER (OUTPATIENT)
Dept: LAB | Facility: MEDICAL CENTER | Age: 86
End: 2021-09-28
Attending: INTERNAL MEDICINE
Payer: MEDICARE

## 2021-09-28 LAB
ALBUMIN SERPL BCP-MCNC: 4.1 G/DL (ref 3.2–4.9)
ALBUMIN/GLOB SERPL: 1.5 G/DL
ALP SERPL-CCNC: 54 U/L (ref 30–99)
ALT SERPL-CCNC: 18 U/L (ref 2–50)
ANION GAP SERPL CALC-SCNC: 10 MMOL/L (ref 7–16)
AST SERPL-CCNC: 19 U/L (ref 12–45)
BILIRUB SERPL-MCNC: 0.5 MG/DL (ref 0.1–1.5)
BUN SERPL-MCNC: 16 MG/DL (ref 8–22)
CALCIUM SERPL-MCNC: 9.2 MG/DL (ref 8.5–10.5)
CHLORIDE SERPL-SCNC: 104 MMOL/L (ref 96–112)
CHOLEST SERPL-MCNC: 147 MG/DL (ref 100–199)
CO2 SERPL-SCNC: 29 MMOL/L (ref 20–33)
CREAT SERPL-MCNC: 0.75 MG/DL (ref 0.5–1.4)
GLOBULIN SER CALC-MCNC: 2.8 G/DL (ref 1.9–3.5)
GLUCOSE SERPL-MCNC: 90 MG/DL (ref 65–99)
HDLC SERPL-MCNC: 51 MG/DL
LDLC SERPL CALC-MCNC: 78 MG/DL
POTASSIUM SERPL-SCNC: 4.3 MMOL/L (ref 3.6–5.5)
PROT SERPL-MCNC: 6.9 G/DL (ref 6–8.2)
SODIUM SERPL-SCNC: 143 MMOL/L (ref 135–145)
TRIGL SERPL-MCNC: 92 MG/DL (ref 0–149)

## 2021-09-28 PROCEDURE — 36415 COLL VENOUS BLD VENIPUNCTURE: CPT

## 2021-09-28 PROCEDURE — 80061 LIPID PANEL: CPT

## 2021-09-28 PROCEDURE — 80053 COMPREHEN METABOLIC PANEL: CPT

## 2021-09-30 ENCOUNTER — APPOINTMENT (RX ONLY)
Dept: URBAN - METROPOLITAN AREA CLINIC 4 | Facility: CLINIC | Age: 86
Setting detail: DERMATOLOGY
End: 2021-09-30

## 2021-09-30 DIAGNOSIS — D18.0 HEMANGIOMA: ICD-10-CM

## 2021-09-30 DIAGNOSIS — D22 MELANOCYTIC NEVI: ICD-10-CM

## 2021-09-30 DIAGNOSIS — L57.0 ACTINIC KERATOSIS: ICD-10-CM

## 2021-09-30 DIAGNOSIS — Z85.820 PERSONAL HISTORY OF MALIGNANT MELANOMA OF SKIN: ICD-10-CM

## 2021-09-30 DIAGNOSIS — Z85.828 PERSONAL HISTORY OF OTHER MALIGNANT NEOPLASM OF SKIN: ICD-10-CM

## 2021-09-30 DIAGNOSIS — L57.8 OTHER SKIN CHANGES DUE TO CHRONIC EXPOSURE TO NONIONIZING RADIATION: ICD-10-CM

## 2021-09-30 DIAGNOSIS — L81.4 OTHER MELANIN HYPERPIGMENTATION: ICD-10-CM

## 2021-09-30 DIAGNOSIS — L82.1 OTHER SEBORRHEIC KERATOSIS: ICD-10-CM

## 2021-09-30 PROBLEM — D48.5 NEOPLASM OF UNCERTAIN BEHAVIOR OF SKIN: Status: ACTIVE | Noted: 2021-09-30

## 2021-09-30 PROBLEM — D22.5 MELANOCYTIC NEVI OF TRUNK: Status: ACTIVE | Noted: 2021-09-30

## 2021-09-30 PROBLEM — D18.01 HEMANGIOMA OF SKIN AND SUBCUTANEOUS TISSUE: Status: ACTIVE | Noted: 2021-09-30

## 2021-09-30 PROCEDURE — 99213 OFFICE O/P EST LOW 20 MIN: CPT | Mod: 25

## 2021-09-30 PROCEDURE — 17000 DESTRUCT PREMALG LESION: CPT | Mod: 59

## 2021-09-30 PROCEDURE — 11102 TANGNTL BX SKIN SINGLE LES: CPT

## 2021-09-30 PROCEDURE — 17003 DESTRUCT PREMALG LES 2-14: CPT

## 2021-09-30 PROCEDURE — ? COUNSELING

## 2021-09-30 PROCEDURE — ? PHOTO-DOCUMENTATION

## 2021-09-30 PROCEDURE — ? LIQUID NITROGEN

## 2021-09-30 PROCEDURE — ? BIOPSY BY SHAVE METHOD

## 2021-09-30 ASSESSMENT — LOCATION ZONE DERM
LOCATION ZONE: ARM
LOCATION ZONE: LEG
LOCATION ZONE: HAND
LOCATION ZONE: TRUNK
LOCATION ZONE: EYELID
LOCATION ZONE: FACE

## 2021-09-30 ASSESSMENT — LOCATION SIMPLE DESCRIPTION DERM
LOCATION SIMPLE: RIGHT LOWER EXTREMITY
LOCATION SIMPLE: LEFT EYELID
LOCATION SIMPLE: RIGHT CHEEK
LOCATION SIMPLE: LEFT FOREARM
LOCATION SIMPLE: RIGHT FOREHEAD
LOCATION SIMPLE: LEFT UPPER EXTREMITY
LOCATION SIMPLE: LEFT LOWER EXTREMITY
LOCATION SIMPLE: LEFT CHEEK
LOCATION SIMPLE: BACK
LOCATION SIMPLE: RIGHT HAND
LOCATION SIMPLE: RIGHT UPPER EXTREMITY
LOCATION SIMPLE: LEFT TEMPLE
LOCATION SIMPLE: LEFT HAND

## 2021-09-30 ASSESSMENT — LOCATION DETAILED DESCRIPTION DERM
LOCATION DETAILED: LEFT UPPER BACK
LOCATION DETAILED: LEFT ANTERIOR THIGH
LOCATION DETAILED: LEFT MID BACK
LOCATION DETAILED: RIGHT UPPER BACK
LOCATION DETAILED: LEFT DORSAL HAND
LOCATION DETAILED: LEFT MID TEMPLE
LOCATION DETAILED: RIGHT DORSAL FOREARM
LOCATION DETAILED: RIGHT SUPERIOR PREAURICULAR CHEEK
LOCATION DETAILED: RIGHT CHEEK
LOCATION DETAILED: LEFT INFERIOR TEMPLE
LOCATION DETAILED: LEFT PROXIMAL RADIAL DORSAL FOREARM
LOCATION DETAILED: RIGHT LATERAL FOREHEAD
LOCATION DETAILED: LEFT CHEEK
LOCATION DETAILED: LEFT INFERIOR LATERAL MALAR CHEEK
LOCATION DETAILED: LEFT DORSAL FOREARM
LOCATION DETAILED: RIGHT DORSAL HAND
LOCATION DETAILED: LEFT LATERAL CANTHUS
LOCATION DETAILED: RIGHT ANTERIOR THIGH
LOCATION DETAILED: RIGHT INFERIOR LATERAL FOREHEAD

## 2021-09-30 NOTE — PROCEDURE: MIPS QUALITY
Quality 138: Melanoma: Coordination Of Care: A treatment plan was communicated to the physicians providing continuing care within one month of diagnosis outlining: diagnosis, tumor thickness and a plan for surgery or alternate care.
Quality 137: Melanoma: Continuity Of Care - Recall System: Patient information entered into a recall system that includes: target date for the next exam specified AND a process to follow up with patients regarding missed or unscheduled appointments
Quality 226: Preventive Care And Screening: Tobacco Use: Screening And Cessation Intervention: Patient screened for tobacco use and is an ex/non-smoker
Quality 111:Pneumonia Vaccination Status For Older Adults: Pneumococcal Vaccination Previously Received
Quality 130: Documentation Of Current Medications In The Medical Record: Current Medications Documented
Detail Level: Detailed

## 2021-09-30 NOTE — HPI: EVALUATION OF SKIN LESION(S)
What Type Of Note Output Would You Prefer (Optional)?: Standard Output
How Severe Are Your Spot(S)?: mild
Hpi Title: Evaluation of Skin Lesions
Year Removed: 3/2020

## 2021-09-30 NOTE — PROCEDURE: LIQUID NITROGEN
Duration Of Freeze Thaw-Cycle (Seconds): 3
Show Aperture Variable?: Yes
Aperture Size (Optional): C
Consent: The patient's consent was obtained including but not limited to risks of crusting, scabbing, blistering, scarring, darker or lighter pigmentary change, recurrence, incomplete removal and infection.
Post-Care Instructions: I reviewed with the patient in detail post-care instructions. Patient is to wear sunprotection, and avoid picking at any of the treated lesions. Pt may apply Vaseline to crusted or scabbing areas.
Render Post-Care Instructions In Note?: no
Number Of Freeze-Thaw Cycles: 2 freeze-thaw cycles
Detail Level: Simple

## 2021-10-07 DIAGNOSIS — D68.59 PROTEIN S DEFICIENCY (HCC): ICD-10-CM

## 2021-10-07 DIAGNOSIS — I48.91 ATRIAL FIBRILLATION, UNSPECIFIED TYPE (HCC): ICD-10-CM

## 2021-10-07 NOTE — TELEPHONE ENCOUNTER
Requested Prescriptions     Signed Prescriptions Disp Refills   • rivaroxaban (XARELTO) 20 MG Tab tablet 90 Tablet 1     Sig: Take 1 Tablet by mouth with dinner.     Authorizing Provider: MIKI MEDINA A.P.R.N.

## 2021-11-08 ENCOUNTER — APPOINTMENT (RX ONLY)
Dept: URBAN - METROPOLITAN AREA CLINIC 4 | Facility: CLINIC | Age: 86
Setting detail: DERMATOLOGY
End: 2021-11-08

## 2021-11-08 PROBLEM — D48.5 NEOPLASM OF UNCERTAIN BEHAVIOR OF SKIN: Status: ACTIVE | Noted: 2021-11-08

## 2021-11-08 PROBLEM — C44.02 SQUAMOUS CELL CARCINOMA OF SKIN OF LIP: Status: ACTIVE | Noted: 2021-11-08

## 2021-11-08 PROCEDURE — 11642 EXC F/E/E/N/L MAL+MRG 1.1-2: CPT

## 2021-11-08 PROCEDURE — 11102 TANGNTL BX SKIN SINGLE LES: CPT | Mod: 59

## 2021-11-08 PROCEDURE — ? EXCISION

## 2021-11-08 PROCEDURE — 13152 CMPLX RPR E/N/E/L 2.6-7.5 CM: CPT | Mod: 59

## 2021-11-08 PROCEDURE — ? BIOPSY BY SHAVE METHOD

## 2021-11-08 NOTE — PROCEDURE: EXCISION
Surgeon (Optional): Ayesha
Biopsy Photograph Reviewed: Yes
Size Of Lesion In Cm: 1.2
X Size Of Lesion In Cm (Optional): 0
Size Of Margin In Cm: 0.2
Anesthesia Volume In Cc: 12
Was An Eye Clamp Used?: No
Eye Clamp Note Details: An eye clamp was used during the procedure.
Excision Method: Elliptical
Saucerization Depth: dermis and superficial adipose tissue
Repair Type: Complex
Suturegard Retention Suture: 2-0 Nylon
Retention Suture Bite Size: 3 mm
Length To Time In Minutes Device Was In Place: 10
Number Of Hemigard Strips Per Side: 1
Intermediate / Complex Repair - Final Wound Length In Cm: 2.7
Width Of Defect Perpendicular To Closure In Cm (Required): 1.4
Distance Of Undermining In Cm (Required): 1.6
Undermining Type: Entire Wound
Debridement Text: The wound edges were debrided prior to proceeding with the closure to facilitate wound healing.
Helical Rim Text: The closure involved the helical rim.
Vermilion Border Text: The closure involved the vermilion border.
Nostril Rim Text: The closure involved the nostril rim.
Retention Suture Text: Retention sutures were placed to support the closure and prevent dehiscence.
Lab: [id number]
Lab Facility: 
Graft Donor Site Bandage (Optional-Leave Blank If You Don't Want In Note): Steri-strips and a pressure bandage were applied to the donor site.
Epidermal Closure Graft Donor Site (Optional): simple interrupted
Billing Type: Third-Party Bill
Excision Depth: adipose tissue
Scalpel Size: 15 blade
Anesthesia Type: 1% lidocaine with epinephrine
Additional Anesthesia Volume In Cc: 6
Hemostasis: Electrocautery
Estimated Blood Loss (Cc): minimal
Detail Level: Detailed
Repair Anesthesia Method: local infiltration
Deep Sutures: 5-0 Vicryl
Dermal Closure: buried vertical mattress
Epidermal Sutures: 5-0 Vicryl Rapide
Epidermal Closure: running subcuticular
Wound Care: Bacitracin
Dressing: dry sterile dressing
Suturegard Intro: Intraoperative tissue expansion was performed, utilizing the SUTUREGARD device, in order to reduce wound tension.
Suturegard Body: The suture ends were repeatedly re-tightened and re-clamped to achieve the desired tissue expansion.
Hemigard Intro: Due to skin fragility and wound tension, it was decided to use HEMIGARD adhesive retention suture devices to permit a linear closure. The skin was cleaned and dried for a 6cm distance away from the wound. Excessive hair, if present, was removed to allow for adhesion.
Hemigard Postcare Instructions: The HEMIGARD strips are to remain completely dry for at least 5-7 days.
Positioning (Leave Blank If You Do Not Want): The patient was placed in a comfortable position exposing the surgical site.
Pre-Excision Curettage Text (Leave Blank If You Do Not Want): Prior to drawing the surgical margin the visible lesion was removed with electrodesiccation and curettage to clearly define the lesion size.
Complex Repair Preamble Text (Leave Blank If You Do Not Want): Extensive wide undermining was performed.
Intermediate Repair Preamble Text (Leave Blank If You Do Not Want): Undermining was performed with blunt dissection.
Curvilinear Excision Additional Text (Leave Blank If You Do Not Want): The margin was drawn around the clinically apparent lesion.  A curvilinear shape was then drawn on the skin incorporating the lesion and margins.  Incisions were then made along these lines to the appropriate tissue plane and the lesion was extirpated.
Fusiform Excision Additional Text (Leave Blank If You Do Not Want): The margin was drawn around the clinically apparent lesion.  A fusiform shape was then drawn on the skin incorporating the lesion and margins.  Incisions were then made along these lines to the appropriate tissue plane and the lesion was extirpated.
Elliptical Excision Additional Text (Leave Blank If You Do Not Want): The margin was drawn around the clinically apparent lesion.  An elliptical shape was then drawn on the skin incorporating the lesion and margins.  Incisions were then made along these lines to the appropriate tissue plane and the lesion was extirpated.
Saucerization Excision Additional Text (Leave Blank If You Do Not Want): The margin was drawn around the clinically apparent lesion.  Incisions were then made along these lines, in a tangential fashion, to the appropriate tissue plane and the lesion was extirpated.
Slit Excision Additional Text (Leave Blank If You Do Not Want): A linear line was drawn on the skin overlying the lesion. An incision was made slowly until the lesion was visualized.  Once visualized, the lesion was removed with blunt dissection.
Excisional Biopsy Additional Text (Leave Blank If You Do Not Want): The margin was drawn around the clinically apparent lesion. An elliptical shape was then drawn on the skin incorporating the lesion and margins.  Incisions were then made along these lines to the appropriate tissue plane and the lesion was extirpated.
Perilesional Excision Additional Text (Leave Blank If You Do Not Want): The margin was drawn around the clinically apparent lesion. Incisions were then made along these lines to the appropriate tissue plane and the lesion was extirpated.
Repair Performed By Another Provider Text (Leave Blank If You Do Not Want): After the tissue was excised the defect was repaired by another provider.
No Repair - Repaired With Adjacent Surgical Defect Text (Leave Blank If You Do Not Want): After the excision the defect was repaired concurrently with another surgical defect which was in close approximation.
Adjacent Tissue Transfer Text: The defect edges were debeveled with a #15 scalpel blade.  Given the location of the defect and the proximity to free margins an adjacent tissue transfer was deemed most appropriate.  Using a sterile surgical marker, an appropriate flap was drawn incorporating the defect and placing the expected incisions within the relaxed skin tension lines where possible.    The area thus outlined was incised deep to adipose tissue with a #15 scalpel blade.  The skin margins were undermined to an appropriate distance in all directions utilizing iris scissors.
Advancement Flap (Single) Text: The defect edges were debeveled with a #15 scalpel blade.  Given the location of the defect and the proximity to free margins a single advancement flap was deemed most appropriate.  Using a sterile surgical marker, an appropriate advancement flap was drawn incorporating the defect and placing the expected incisions within the relaxed skin tension lines where possible.    The area thus outlined was incised deep to adipose tissue with a #15 scalpel blade.  The skin margins were undermined to an appropriate distance in all directions utilizing iris scissors.
Advancement Flap (Double) Text: The defect edges were debeveled with a #15 scalpel blade.  Given the location of the defect and the proximity to free margins a double advancement flap was deemed most appropriate.  Using a sterile surgical marker, the appropriate advancement flaps were drawn incorporating the defect and placing the expected incisions within the relaxed skin tension lines where possible.    The area thus outlined was incised deep to adipose tissue with a #15 scalpel blade.  The skin margins were undermined to an appropriate distance in all directions utilizing iris scissors.
Burow's Advancement Flap Text: The defect edges were debeveled with a #15 scalpel blade.  Given the location of the defect and the proximity to free margins a Burow's advancement flap was deemed most appropriate.  Using a sterile surgical marker, the appropriate advancement flap was drawn incorporating the defect and placing the expected incisions within the relaxed skin tension lines where possible.    The area thus outlined was incised deep to adipose tissue with a #15 scalpel blade.  The skin margins were undermined to an appropriate distance in all directions utilizing iris scissors.
Chonodrocutaneous Helical Advancement Flap Text: The defect edges were debeveled with a #15 scalpel blade.  Given the location of the defect and the proximity to free margins a chondrocutaneous helical advancement flap was deemed most appropriate.  Using a sterile surgical marker, the appropriate advancement flap was drawn incorporating the defect and placing the expected incisions within the relaxed skin tension lines where possible.    The area thus outlined was incised deep to adipose tissue with a #15 scalpel blade.  The skin margins were undermined to an appropriate distance in all directions utilizing iris scissors.
Crescentic Advancement Flap Text: The defect edges were debeveled with a #15 scalpel blade.  Given the location of the defect and the proximity to free margins a crescentic advancement flap was deemed most appropriate.  Using a sterile surgical marker, the appropriate advancement flap was drawn incorporating the defect and placing the expected incisions within the relaxed skin tension lines where possible.    The area thus outlined was incised deep to adipose tissue with a #15 scalpel blade.  The skin margins were undermined to an appropriate distance in all directions utilizing iris scissors.
A-T Advancement Flap Text: The defect edges were debeveled with a #15 scalpel blade.  Given the location of the defect, shape of the defect and the proximity to free margins an A-T advancement flap was deemed most appropriate.  Using a sterile surgical marker, an appropriate advancement flap was drawn incorporating the defect and placing the expected incisions within the relaxed skin tension lines where possible.    The area thus outlined was incised deep to adipose tissue with a #15 scalpel blade.  The skin margins were undermined to an appropriate distance in all directions utilizing iris scissors.
O-T Advancement Flap Text: The defect edges were debeveled with a #15 scalpel blade.  Given the location of the defect, shape of the defect and the proximity to free margins an O-T advancement flap was deemed most appropriate.  Using a sterile surgical marker, an appropriate advancement flap was drawn incorporating the defect and placing the expected incisions within the relaxed skin tension lines where possible.    The area thus outlined was incised deep to adipose tissue with a #15 scalpel blade.  The skin margins were undermined to an appropriate distance in all directions utilizing iris scissors.
O-L Flap Text: The defect edges were debeveled with a #15 scalpel blade.  Given the location of the defect, shape of the defect and the proximity to free margins an O-L flap was deemed most appropriate.  Using a sterile surgical marker, an appropriate advancement flap was drawn incorporating the defect and placing the expected incisions within the relaxed skin tension lines where possible.    The area thus outlined was incised deep to adipose tissue with a #15 scalpel blade.  The skin margins were undermined to an appropriate distance in all directions utilizing iris scissors.
O-Z Flap Text: The defect edges were debeveled with a #15 scalpel blade.  Given the location of the defect, shape of the defect and the proximity to free margins an O-Z flap was deemed most appropriate.  Using a sterile surgical marker, an appropriate transposition flap was drawn incorporating the defect and placing the expected incisions within the relaxed skin tension lines where possible. The area thus outlined was incised deep to adipose tissue with a #15 scalpel blade.  The skin margins were undermined to an appropriate distance in all directions utilizing iris scissors.
Double O-Z Flap Text: The defect edges were debeveled with a #15 scalpel blade.  Given the location of the defect, shape of the defect and the proximity to free margins a Double O-Z flap was deemed most appropriate.  Using a sterile surgical marker, an appropriate transposition flap was drawn incorporating the defect and placing the expected incisions within the relaxed skin tension lines where possible. The area thus outlined was incised deep to adipose tissue with a #15 scalpel blade.  The skin margins were undermined to an appropriate distance in all directions utilizing iris scissors.
V-Y Flap Text: The defect edges were debeveled with a #15 scalpel blade.  Given the location of the defect, shape of the defect and the proximity to free margins a V-Y flap was deemed most appropriate.  Using a sterile surgical marker, an appropriate advancement flap was drawn incorporating the defect and placing the expected incisions within the relaxed skin tension lines where possible.    The area thus outlined was incised deep to adipose tissue with a #15 scalpel blade.  The skin margins were undermined to an appropriate distance in all directions utilizing iris scissors.
Advancement-Rotation Flap Text: The defect edges were debeveled with a #15 scalpel blade.  Given the location of the defect, shape of the defect and the proximity to free margins an advancement-rotation flap was deemed most appropriate.  Using a sterile surgical marker, an appropriate flap was drawn incorporating the defect and placing the expected incisions within the relaxed skin tension lines where possible. The area thus outlined was incised deep to adipose tissue with a #15 scalpel blade.  The skin margins were undermined to an appropriate distance in all directions utilizing iris scissors.
Mercedes Flap Text: The defect edges were debeveled with a #15 scalpel blade.  Given the location of the defect, shape of the defect and the proximity to free margins a Mercedes flap was deemed most appropriate.  Using a sterile surgical marker, an appropriate advancement flap was drawn incorporating the defect and placing the expected incisions within the relaxed skin tension lines where possible. The area thus outlined was incised deep to adipose tissue with a #15 scalpel blade.  The skin margins were undermined to an appropriate distance in all directions utilizing iris scissors.
Modified Advancement Flap Text: The defect edges were debeveled with a #15 scalpel blade.  Given the location of the defect, shape of the defect and the proximity to free margins a modified advancement flap was deemed most appropriate.  Using a sterile surgical marker, an appropriate advancement flap was drawn incorporating the defect and placing the expected incisions within the relaxed skin tension lines where possible.    The area thus outlined was incised deep to adipose tissue with a #15 scalpel blade.  The skin margins were undermined to an appropriate distance in all directions utilizing iris scissors.
Mucosal Advancement Flap Text: Given the location of the defect, shape of the defect and the proximity to free margins a mucosal advancement flap was deemed most appropriate. Incisions were made with a 15 blade scalpel in the appropriate fashion along the cutaneous vermilion border and the mucosal lip. The remaining actinically damaged mucosal tissue was excised.  The mucosal advancement flap was then elevated to the gingival sulcus with care taken to preserve the neurovascular structures and advanced into the primary defect. Care was taken to ensure that precise realignment of the vermilion border was achieved.
Peng Advancement Flap Text: The defect edges were debeveled with a #15 scalpel blade.  Given the location of the defect, shape of the defect and the proximity to free margins a Peng advancement flap was deemed most appropriate.  Using a sterile surgical marker, an appropriate advancement flap was drawn incorporating the defect and placing the expected incisions within the relaxed skin tension lines where possible. The area thus outlined was incised deep to adipose tissue with a #15 scalpel blade.  The skin margins were undermined to an appropriate distance in all directions utilizing iris scissors.
Hatchet Flap Text: The defect edges were debeveled with a #15 scalpel blade.  Given the location of the defect, shape of the defect and the proximity to free margins a hatchet flap was deemed most appropriate.  Using a sterile surgical marker, an appropriate hatchet flap was drawn incorporating the defect and placing the expected incisions within the relaxed skin tension lines where possible.    The area thus outlined was incised deep to adipose tissue with a #15 scalpel blade.  The skin margins were undermined to an appropriate distance in all directions utilizing iris scissors.
Rotation Flap Text: The defect edges were debeveled with a #15 scalpel blade.  Given the location of the defect, shape of the defect and the proximity to free margins a rotation flap was deemed most appropriate.  Using a sterile surgical marker, an appropriate rotation flap was drawn incorporating the defect and placing the expected incisions within the relaxed skin tension lines where possible.    The area thus outlined was incised deep to adipose tissue with a #15 scalpel blade.  The skin margins were undermined to an appropriate distance in all directions utilizing iris scissors.
Spiral Flap Text: The defect edges were debeveled with a #15 scalpel blade.  Given the location of the defect, shape of the defect and the proximity to free margins a spiral flap was deemed most appropriate.  Using a sterile surgical marker, an appropriate rotation flap was drawn incorporating the defect and placing the expected incisions within the relaxed skin tension lines where possible. The area thus outlined was incised deep to adipose tissue with a #15 scalpel blade.  The skin margins were undermined to an appropriate distance in all directions utilizing iris scissors.
Staged Advancement Flap Text: The defect edges were debeveled with a #15 scalpel blade.  Given the location of the defect, shape of the defect and the proximity to free margins a staged advancement flap was deemed most appropriate.  Using a sterile surgical marker, an appropriate advancement flap was drawn incorporating the defect and placing the expected incisions within the relaxed skin tension lines where possible. The area thus outlined was incised deep to adipose tissue with a #15 scalpel blade.  The skin margins were undermined to an appropriate distance in all directions utilizing iris scissors.
Star Wedge Flap Text: The defect edges were debeveled with a #15 scalpel blade.  Given the location of the defect, shape of the defect and the proximity to free margins a star wedge flap was deemed most appropriate.  Using a sterile surgical marker, an appropriate rotation flap was drawn incorporating the defect and placing the expected incisions within the relaxed skin tension lines where possible. The area thus outlined was incised deep to adipose tissue with a #15 scalpel blade.  The skin margins were undermined to an appropriate distance in all directions utilizing iris scissors.
Transposition Flap Text: The defect edges were debeveled with a #15 scalpel blade.  Given the location of the defect and the proximity to free margins a transposition flap was deemed most appropriate.  Using a sterile surgical marker, an appropriate transposition flap was drawn incorporating the defect.    The area thus outlined was incised deep to adipose tissue with a #15 scalpel blade.  The skin margins were undermined to an appropriate distance in all directions utilizing iris scissors.
Muscle Hinge Flap Text: The defect edges were debeveled with a #15 scalpel blade.  Given the size, depth and location of the defect and the proximity to free margins a muscle hinge flap was deemed most appropriate.  Using a sterile surgical marker, an appropriate hinge flap was drawn incorporating the defect. The area thus outlined was incised with a #15 scalpel blade.  The skin margins were undermined to an appropriate distance in all directions utilizing iris scissors.
Mustarde Flap Text: The defect edges were debeveled with a #15 scalpel blade.  Given the size, depth and location of the defect and the proximity to free margins a Mustarde flap was deemed most appropriate.  Using a sterile surgical marker, an appropriate flap was drawn incorporating the defect. The area thus outlined was incised with a #15 scalpel blade.  The skin margins were undermined to an appropriate distance in all directions utilizing iris scissors.
Nasal Turnover Hinge Flap Text: The defect edges were debeveled with a #15 scalpel blade.  Given the size, depth, location of the defect and the defect being full thickness a nasal turnover hinge flap was deemed most appropriate.  Using a sterile surgical marker, an appropriate hinge flap was drawn incorporating the defect. The area thus outlined was incised with a #15 scalpel blade. The flap was designed to recreate the nasal mucosal lining and the alar rim. The skin margins were undermined to an appropriate distance in all directions utilizing iris scissors.
Nasalis-Muscle-Based Myocutaneous Island Pedicle Flap Text: Using a #15 blade, an incision was made around the donor flap to the level of the nasalis muscle. Wide lateral undermining was then performed in both the subcutaneous plane above the nasalis muscle, and in a submuscular plane just above periosteum. This allowed the formation of a free nasalis muscle axial pedicle (based on the angular artery) which was still attached to the actual cutaneous flap, increasing its mobility and vascular viability. Hemostasis was obtained with pinpoint electrocoagulation. The flap was mobilized into position and the pivotal anchor points positioned and stabilized with buried interrupted sutures. Subcutaneous and dermal tissues were closed in a multilayered fashion with sutures. Tissue redundancies were excised, and the epidermal edges were apposed without significant tension and sutured with sutures.
Orbicularis Oris Muscle Flap Text: The defect edges were debeveled with a #15 scalpel blade.  Given that the defect affected the competency of the oral sphincter an orbicularis oris muscle flap was deemed most appropriate to restore this competency and normal muscle function.  Using a sterile surgical marker, an appropriate flap was drawn incorporating the defect. The area thus outlined was incised with a #15 scalpel blade.
Melolabial Transposition Flap Text: The defect edges were debeveled with a #15 scalpel blade.  Given the location of the defect and the proximity to free margins a melolabial flap was deemed most appropriate.  Using a sterile surgical marker, an appropriate melolabial transposition flap was drawn incorporating the defect.    The area thus outlined was incised deep to adipose tissue with a #15 scalpel blade.  The skin margins were undermined to an appropriate distance in all directions utilizing iris scissors.
Rhombic Flap Text: The defect edges were debeveled with a #15 scalpel blade.  Given the location of the defect and the proximity to free margins a rhombic flap was deemed most appropriate.  Using a sterile surgical marker, an appropriate rhombic flap was drawn incorporating the defect.    The area thus outlined was incised deep to adipose tissue with a #15 scalpel blade.  The skin margins were undermined to an appropriate distance in all directions utilizing iris scissors.
Rhomboid Transposition Flap Text: The defect edges were debeveled with a #15 scalpel blade.  Given the location of the defect and the proximity to free margins a rhomboid transposition flap was deemed most appropriate.  Using a sterile surgical marker, an appropriate rhomboid flap was drawn incorporating the defect.    The area thus outlined was incised deep to adipose tissue with a #15 scalpel blade.  The skin margins were undermined to an appropriate distance in all directions utilizing iris scissors.
Bi-Rhombic Flap Text: The defect edges were debeveled with a #15 scalpel blade.  Given the location of the defect and the proximity to free margins a bi-rhombic flap was deemed most appropriate.  Using a sterile surgical marker, an appropriate rhombic flap was drawn incorporating the defect. The area thus outlined was incised deep to adipose tissue with a #15 scalpel blade.  The skin margins were undermined to an appropriate distance in all directions utilizing iris scissors.
Helical Rim Advancement Flap Text: The defect edges were debeveled with a #15 blade scalpel.  Given the location of the defect and the proximity to free margins (helical rim) a double helical rim advancement flap was deemed most appropriate.  Using a sterile surgical marker, the appropriate advancement flaps were drawn incorporating the defect and placing the expected incisions between the helical rim and antihelix where possible.  The area thus outlined was incised through and through with a #15 scalpel blade.  With a skin hook and iris scissors, the flaps were gently and sharply undermined and freed up.
Bilateral Helical Rim Advancement Flap Text: The defect edges were debeveled with a #15 blade scalpel.  Given the location of the defect and the proximity to free margins (helical rim) a bilateral helical rim advancement flap was deemed most appropriate.  Using a sterile surgical marker, the appropriate advancement flaps were drawn incorporating the defect and placing the expected incisions between the helical rim and antihelix where possible.  The area thus outlined was incised through and through with a #15 scalpel blade.  With a skin hook and iris scissors, the flaps were gently and sharply undermined and freed up.
Ear Star Wedge Flap Text: The defect edges were debeveled with a #15 blade scalpel.  Given the location of the defect and the proximity to free margins (helical rim) an ear star wedge flap was deemed most appropriate.  Using a sterile surgical marker, the appropriate flap was drawn incorporating the defect and placing the expected incisions between the helical rim and antihelix where possible.  The area thus outlined was incised through and through with a #15 scalpel blade.
Banner Transposition Flap Text: The defect edges were debeveled with a #15 scalpel blade.  Given the location of the defect and the proximity to free margins a Banner transposition flap was deemed most appropriate.  Using a sterile surgical marker, an appropriate flap drawn around the defect. The area thus outlined was incised deep to adipose tissue with a #15 scalpel blade.  The skin margins were undermined to an appropriate distance in all directions utilizing iris scissors.
Bilobed Flap Text: The defect edges were debeveled with a #15 scalpel blade.  Given the location of the defect and the proximity to free margins a bilobe flap was deemed most appropriate.  Using a sterile surgical marker, an appropriate bilobe flap drawn around the defect.    The area thus outlined was incised deep to adipose tissue with a #15 scalpel blade.  The skin margins were undermined to an appropriate distance in all directions utilizing iris scissors.
Bilobed Transposition Flap Text: The defect edges were debeveled with a #15 scalpel blade.  Given the location of the defect and the proximity to free margins a bilobed transposition flap was deemed most appropriate.  Using a sterile surgical marker, an appropriate bilobe flap drawn around the defect.    The area thus outlined was incised deep to adipose tissue with a #15 scalpel blade.  The skin margins were undermined to an appropriate distance in all directions utilizing iris scissors.
Trilobed Flap Text: The defect edges were debeveled with a #15 scalpel blade.  Given the location of the defect and the proximity to free margins a trilobed flap was deemed most appropriate.  Using a sterile surgical marker, an appropriate trilobed flap drawn around the defect.    The area thus outlined was incised deep to adipose tissue with a #15 scalpel blade.  The skin margins were undermined to an appropriate distance in all directions utilizing iris scissors.
Dorsal Nasal Flap Text: The defect edges were debeveled with a #15 scalpel blade.  Given the location of the defect and the proximity to free margins a dorsal nasal flap was deemed most appropriate.  Using a sterile surgical marker, an appropriate dorsal nasal flap was drawn around the defect.    The area thus outlined was incised deep to adipose tissue with a #15 scalpel blade.  The skin margins were undermined to an appropriate distance in all directions utilizing iris scissors.
Island Pedicle Flap Text: The defect edges were debeveled with a #15 scalpel blade.  Given the location of the defect, shape of the defect and the proximity to free margins an island pedicle advancement flap was deemed most appropriate.  Using a sterile surgical marker, an appropriate advancement flap was drawn incorporating the defect, outlining the appropriate donor tissue and placing the expected incisions within the relaxed skin tension lines where possible.    The area thus outlined was incised deep to adipose tissue with a #15 scalpel blade.  The skin margins were undermined to an appropriate distance in all directions around the primary defect and laterally outward around the island pedicle utilizing iris scissors.  There was minimal undermining beneath the pedicle flap.
Island Pedicle Flap With Canthal Suspension Text: The defect edges were debeveled with a #15 scalpel blade.  Given the location of the defect, shape of the defect and the proximity to free margins an island pedicle advancement flap was deemed most appropriate.  Using a sterile surgical marker, an appropriate advancement flap was drawn incorporating the defect, outlining the appropriate donor tissue and placing the expected incisions within the relaxed skin tension lines where possible. The area thus outlined was incised deep to adipose tissue with a #15 scalpel blade.  The skin margins were undermined to an appropriate distance in all directions around the primary defect and laterally outward around the island pedicle utilizing iris scissors.  There was minimal undermining beneath the pedicle flap. A suspension suture was placed in the canthal tendon to prevent tension and prevent ectropion.
Alar Island Pedicle Flap Text: The defect edges were debeveled with a #15 scalpel blade.  Given the location of the defect, shape of the defect and the proximity to the alar rim an island pedicle advancement flap was deemed most appropriate.  Using a sterile surgical marker, an appropriate advancement flap was drawn incorporating the defect, outlining the appropriate donor tissue and placing the expected incisions within the nasal ala running parallel to the alar rim. The area thus outlined was incised with a #15 scalpel blade.  The skin margins were undermined minimally to an appropriate distance in all directions around the primary defect and laterally outward around the island pedicle utilizing iris scissors.  There was minimal undermining beneath the pedicle flap.
Double Island Pedicle Flap Text: The defect edges were debeveled with a #15 scalpel blade.  Given the location of the defect, shape of the defect and the proximity to free margins a double island pedicle advancement flap was deemed most appropriate.  Using a sterile surgical marker, an appropriate advancement flap was drawn incorporating the defect, outlining the appropriate donor tissue and placing the expected incisions within the relaxed skin tension lines where possible.    The area thus outlined was incised deep to adipose tissue with a #15 scalpel blade.  The skin margins were undermined to an appropriate distance in all directions around the primary defect and laterally outward around the island pedicle utilizing iris scissors.  There was minimal undermining beneath the pedicle flap.
Island Pedicle Flap-Requiring Vessel Identification Text: The defect edges were debeveled with a #15 scalpel blade.  Given the location of the defect, shape of the defect and the proximity to free margins an island pedicle advancement flap was deemed most appropriate.  Using a sterile surgical marker, an appropriate advancement flap was drawn, based on the axial vessel mentioned above, incorporating the defect, outlining the appropriate donor tissue and placing the expected incisions within the relaxed skin tension lines where possible.    The area thus outlined was incised deep to adipose tissue with a #15 scalpel blade.  The skin margins were undermined to an appropriate distance in all directions around the primary defect and laterally outward around the island pedicle utilizing iris scissors.  There was minimal undermining beneath the pedicle flap.
Keystone Flap Text: The defect edges were debeveled with a #15 scalpel blade.  Given the location of the defect, shape of the defect a keystone flap was deemed most appropriate.  Using a sterile surgical marker, an appropriate keystone flap was drawn incorporating the defect, outlining the appropriate donor tissue and placing the expected incisions within the relaxed skin tension lines where possible. The area thus outlined was incised deep to adipose tissue with a #15 scalpel blade.  The skin margins were undermined to an appropriate distance in all directions around the primary defect and laterally outward around the flap utilizing iris scissors.
O-T Plasty Text: The defect edges were debeveled with a #15 scalpel blade.  Given the location of the defect, shape of the defect and the proximity to free margins an O-T plasty was deemed most appropriate.  Using a sterile surgical marker, an appropriate O-T plasty was drawn incorporating the defect and placing the expected incisions within the relaxed skin tension lines where possible.    The area thus outlined was incised deep to adipose tissue with a #15 scalpel blade.  The skin margins were undermined to an appropriate distance in all directions utilizing iris scissors.
O-Z Plasty Text: The defect edges were debeveled with a #15 scalpel blade.  Given the location of the defect, shape of the defect and the proximity to free margins an O-Z plasty (double transposition flap) was deemed most appropriate.  Using a sterile surgical marker, the appropriate transposition flaps were drawn incorporating the defect and placing the expected incisions within the relaxed skin tension lines where possible.    The area thus outlined was incised deep to adipose tissue with a #15 scalpel blade.  The skin margins were undermined to an appropriate distance in all directions utilizing iris scissors.  Hemostasis was achieved with electrocautery.  The flaps were then transposed into place, one clockwise and the other counterclockwise, and anchored with interrupted buried subcutaneous sutures.
Double O-Z Plasty Text: The defect edges were debeveled with a #15 scalpel blade.  Given the location of the defect, shape of the defect and the proximity to free margins a Double O-Z plasty (double transposition flap) was deemed most appropriate.  Using a sterile surgical marker, the appropriate transposition flaps were drawn incorporating the defect and placing the expected incisions within the relaxed skin tension lines where possible. The area thus outlined was incised deep to adipose tissue with a #15 scalpel blade.  The skin margins were undermined to an appropriate distance in all directions utilizing iris scissors.  Hemostasis was achieved with electrocautery.  The flaps were then transposed into place, one clockwise and the other counterclockwise, and anchored with interrupted buried subcutaneous sutures.
V-Y Plasty Text: The defect edges were debeveled with a #15 scalpel blade.  Given the location of the defect, shape of the defect and the proximity to free margins an V-Y advancement flap was deemed most appropriate.  Using a sterile surgical marker, an appropriate advancement flap was drawn incorporating the defect and placing the expected incisions within the relaxed skin tension lines where possible.    The area thus outlined was incised deep to adipose tissue with a #15 scalpel blade.  The skin margins were undermined to an appropriate distance in all directions utilizing iris scissors.
H Plasty Text: Given the location of the defect, shape of the defect and the proximity to free margins a H-plasty was deemed most appropriate for repair.  Using a sterile surgical marker, the appropriate advancement arms of the H-plasty were drawn incorporating the defect and placing the expected incisions within the relaxed skin tension lines where possible. The area thus outlined was incised deep to adipose tissue with a #15 scalpel blade. The skin margins were undermined to an appropriate distance in all directions utilizing iris scissors.  The opposing advancement arms were then advanced into place in opposite direction and anchored with interrupted buried subcutaneous sutures.
W Plasty Text: The lesion was extirpated to the level of the fat with a #15 scalpel blade.  Given the location of the defect, shape of the defect and the proximity to free margins a W-plasty was deemed most appropriate for repair.  Using a sterile surgical marker, the appropriate transposition arms of the W-plasty were drawn incorporating the defect and placing the expected incisions within the relaxed skin tension lines where possible.    The area thus outlined was incised deep to adipose tissue with a #15 scalpel blade.  The skin margins were undermined to an appropriate distance in all directions utilizing iris scissors.  The opposing transposition arms were then transposed into place in opposite direction and anchored with interrupted buried subcutaneous sutures.
Z Plasty Text: The lesion was extirpated to the level of the fat with a #15 scalpel blade.  Given the location of the defect, shape of the defect and the proximity to free margins a Z-plasty was deemed most appropriate for repair.  Using a sterile surgical marker, the appropriate transposition arms of the Z-plasty were drawn incorporating the defect and placing the expected incisions within the relaxed skin tension lines where possible.    The area thus outlined was incised deep to adipose tissue with a #15 scalpel blade.  The skin margins were undermined to an appropriate distance in all directions utilizing iris scissors.  The opposing transposition arms were then transposed into place in opposite direction and anchored with interrupted buried subcutaneous sutures.
Zygomaticofacial Flap Text: Given the location of the defect, shape of the defect and the proximity to free margins a zygomaticofacial flap was deemed most appropriate for repair.  Using a sterile surgical marker, the appropriate flap was drawn incorporating the defect and placing the expected incisions within the relaxed skin tension lines where possible. The area thus outlined was incised deep to adipose tissue with a #15 scalpel blade with preservation of a vascular pedicle.  The skin margins were undermined to an appropriate distance in all directions utilizing iris scissors.  The flap was then placed into the defect and anchored with interrupted buried subcutaneous sutures.
Cheek Interpolation Flap Text: A decision was made to reconstruct the defect utilizing an interpolation axial flap and a staged reconstruction.  A telfa template was made of the defect.  This telfa template was then used to outline the Cheek Interpolation flap.  The donor area for the pedicle flap was then injected with anesthesia.  The flap was excised through the skin and subcutaneous tissue down to the layer of the underlying musculature.  The interpolation flap was carefully excised within this deep plane to maintain its blood supply.  The edges of the donor site were undermined.   The donor site was closed in a primary fashion.  The pedicle was then rotated into position and sutured.  Once the tube was sutured into place, adequate blood supply was confirmed with blanching and refill.  The pedicle was then wrapped with xeroform gauze and dressed appropriately with a telfa and gauze bandage to ensure continued blood supply and protect the attached pedicle.
Cheek-To-Nose Interpolation Flap Text: A decision was made to reconstruct the defect utilizing an interpolation axial flap and a staged reconstruction.  A telfa template was made of the defect.  This telfa template was then used to outline the Cheek-To-Nose Interpolation flap.  The donor area for the pedicle flap was then injected with anesthesia.  The flap was excised through the skin and subcutaneous tissue down to the layer of the underlying musculature.  The interpolation flap was carefully excised within this deep plane to maintain its blood supply.  The edges of the donor site were undermined.   The donor site was closed in a primary fashion.  The pedicle was then rotated into position and sutured.  Once the tube was sutured into place, adequate blood supply was confirmed with blanching and refill.  The pedicle was then wrapped with xeroform gauze and dressed appropriately with a telfa and gauze bandage to ensure continued blood supply and protect the attached pedicle.
Interpolation Flap Text: A decision was made to reconstruct the defect utilizing an interpolation axial flap and a staged reconstruction.  A telfa template was made of the defect.  This telfa template was then used to outline the interpolation flap.  The donor area for the pedicle flap was then injected with anesthesia.  The flap was excised through the skin and subcutaneous tissue down to the layer of the underlying musculature.  The interpolation flap was carefully excised within this deep plane to maintain its blood supply.  The edges of the donor site were undermined.   The donor site was closed in a primary fashion.  The pedicle was then rotated into position and sutured.  Once the tube was sutured into place, adequate blood supply was confirmed with blanching and refill.  The pedicle was then wrapped with xeroform gauze and dressed appropriately with a telfa and gauze bandage to ensure continued blood supply and protect the attached pedicle.
Melolabial Interpolation Flap Text: A decision was made to reconstruct the defect utilizing an interpolation axial flap and a staged reconstruction.  A telfa template was made of the defect.  This telfa template was then used to outline the melolabial interpolation flap.  The donor area for the pedicle flap was then injected with anesthesia.  The flap was excised through the skin and subcutaneous tissue down to the layer of the underlying musculature.  The pedicle flap was carefully excised within this deep plane to maintain its blood supply.  The edges of the donor site were undermined.   The donor site was closed in a primary fashion.  The pedicle was then rotated into position and sutured.  Once the tube was sutured into place, adequate blood supply was confirmed with blanching and refill.  The pedicle was then wrapped with xeroform gauze and dressed appropriately with a telfa and gauze bandage to ensure continued blood supply and protect the attached pedicle.
Mastoid Interpolation Flap Text: A decision was made to reconstruct the defect utilizing an interpolation axial flap and a staged reconstruction.  A telfa template was made of the defect.  This telfa template was then used to outline the mastoid interpolation flap.  The donor area for the pedicle flap was then injected with anesthesia.  The flap was excised through the skin and subcutaneous tissue down to the layer of the underlying musculature.  The pedicle flap was carefully excised within this deep plane to maintain its blood supply.  The edges of the donor site were undermined.   The donor site was closed in a primary fashion.  The pedicle was then rotated into position and sutured.  Once the tube was sutured into place, adequate blood supply was confirmed with blanching and refill.  The pedicle was then wrapped with xeroform gauze and dressed appropriately with a telfa and gauze bandage to ensure continued blood supply and protect the attached pedicle.
Posterior Auricular Interpolation Flap Text: A decision was made to reconstruct the defect utilizing an interpolation axial flap and a staged reconstruction.  A telfa template was made of the defect.  This telfa template was then used to outline the posterior auricular interpolation flap.  The donor area for the pedicle flap was then injected with anesthesia.  The flap was excised through the skin and subcutaneous tissue down to the layer of the underlying musculature.  The pedicle flap was carefully excised within this deep plane to maintain its blood supply.  The edges of the donor site were undermined.   The donor site was closed in a primary fashion.  The pedicle was then rotated into position and sutured.  Once the tube was sutured into place, adequate blood supply was confirmed with blanching and refill.  The pedicle was then wrapped with xeroform gauze and dressed appropriately with a telfa and gauze bandage to ensure continued blood supply and protect the attached pedicle.
Paramedian Forehead Flap Text: A decision was made to reconstruct the defect utilizing an interpolation axial flap and a staged reconstruction.  A telfa template was made of the defect.  This telfa template was then used to outline the paramedian forehead pedicle flap.  The donor area for the pedicle flap was then injected with anesthesia.  The flap was excised through the skin and subcutaneous tissue down to the layer of the underlying musculature.  The pedicle flap was carefully excised within this deep plane to maintain its blood supply.  The edges of the donor site were undermined.   The donor site was closed in a primary fashion.  The pedicle was then rotated into position and sutured.  Once the tube was sutured into place, adequate blood supply was confirmed with blanching and refill.  The pedicle was then wrapped with xeroform gauze and dressed appropriately with a telfa and gauze bandage to ensure continued blood supply and protect the attached pedicle.
Lip Wedge Excision Repair Text: Given the location of the defect and the proximity to free margins a full thickness wedge repair was deemed most appropriate.  Using a sterile surgical marker, the appropriate repair was drawn incorporating the defect and placing the expected incisions perpendicular to the vermilion border.  The vermilion border was also meticulously outlined to ensure appropriate reapproximation during the repair.  The area thus outlined was incised through and through with a #15 scalpel blade.  The muscularis and dermis were reaproximated with deep sutures following hemostasis. Care was taken to realign the vermilion border before proceeding with the superficial closure.  Once the vermilion was realigned the superfical and mucosal closure was finished.
Ftsg Text: The defect edges were debeveled with a #15 scalpel blade.  Given the location of the defect, shape of the defect and the proximity to free margins a full thickness skin graft was deemed most appropriate.  Using a sterile surgical marker, the primary defect shape was transferred to the donor site. The area thus outlined was incised deep to adipose tissue with a #15 scalpel blade.  The harvested graft was then trimmed of adipose tissue until only dermis and epidermis was left.  The skin margins of the secondary defect were undermined to an appropriate distance in all directions utilizing iris scissors.  The secondary defect was closed with interrupted buried subcutaneous sutures.  The skin edges were then re-apposed with running  sutures.  The skin graft was then placed in the primary defect and oriented appropriately.
Split-Thickness Skin Graft Text: The defect edges were debeveled with a #15 scalpel blade.  Given the location of the defect, shape of the defect and the proximity to free margins a split thickness skin graft was deemed most appropriate.  Using a sterile surgical marker, the primary defect shape was transferred to the donor site. The split thickness graft was then harvested.  The skin graft was then placed in the primary defect and oriented appropriately.
Burow's Graft Text: The defect edges were debeveled with a #15 scalpel blade.  Given the location of the defect, shape of the defect, the proximity to free margins and the presence of a standing cone deformity a Burow's skin graft was deemed most appropriate. The standing cone was removed and this tissue was then trimmed to the shape of the primary defect. The adipose tissue was also removed until only dermis and epidermis were left.  The skin margins of the secondary defect were undermined to an appropriate distance in all directions utilizing iris scissors.  The secondary defect was closed with interrupted buried subcutaneous sutures.  The skin edges were then re-apposed with running  sutures.  The skin graft was then placed in the primary defect and oriented appropriately.
Cartilage Graft Text: The defect edges were debeveled with a #15 scalpel blade.  Given the location of the defect, shape of the defect, the fact the defect involved a full thickness cartilage defect a cartilage graft was deemed most appropriate.  An appropriate donor site was identified, cleansed, and anesthetized. The cartilage graft was then harvested and transferred to the recipient site, oriented appropriately and then sutured into place.  The secondary defect was then repaired using a primary closure.
Composite Graft Text: The defect edges were debeveled with a #15 scalpel blade.  Given the location of the defect, shape of the defect, the proximity to free margins and the fact the defect was full thickness a composite graft was deemed most appropriate.  The defect was outline and then transferred to the donor site.  A full thickness graft was then excised from the donor site. The graft was then placed in the primary defect, oriented appropriately and then sutured into place.  The secondary defect was then repaired using a primary closure.
Epidermal Autograft Text: The defect edges were debeveled with a #15 scalpel blade.  Given the location of the defect, shape of the defect and the proximity to free margins an epidermal autograft was deemed most appropriate.  Using a sterile surgical marker, the primary defect shape was transferred to the donor site. The epidermal graft was then harvested.  The skin graft was then placed in the primary defect and oriented appropriately.
Dermal Autograft Text: The defect edges were debeveled with a #15 scalpel blade.  Given the location of the defect, shape of the defect and the proximity to free margins a dermal autograft was deemed most appropriate.  Using a sterile surgical marker, the primary defect shape was transferred to the donor site. The area thus outlined was incised deep to adipose tissue with a #15 scalpel blade.  The harvested graft was then trimmed of adipose and epidermal tissue until only dermis was left.  The skin graft was then placed in the primary defect and oriented appropriately.
Skin Substitute Text: The defect edges were debeveled with a #15 scalpel blade.  Given the location of the defect, shape of the defect and the proximity to free margins a skin substitute graft was deemed most appropriate.  The graft material was trimmed to fit the size of the defect. The graft was then placed in the primary defect and oriented appropriately.
Tissue Cultured Epidermal Autograft Text: The defect edges were debeveled with a #15 scalpel blade.  Given the location of the defect, shape of the defect and the proximity to free margins a tissue cultured epidermal autograft was deemed most appropriate.  The graft was then trimmed to fit the size of the defect.  The graft was then placed in the primary defect and oriented appropriately.
Xenograft Text: The defect edges were debeveled with a #15 scalpel blade.  Given the location of the defect, shape of the defect and the proximity to free margins a xenograft was deemed most appropriate.  The graft was then trimmed to fit the size of the defect.  The graft was then placed in the primary defect and oriented appropriately.
Purse String (Intermediate) Text: Given the location of the defect and the characteristics of the surrounding skin a purse string intermediate closure was deemed most appropriate.  Undermining was performed circumfirentially around the surgical defect.  A purse string suture was then placed and tightened.
Purse String (Simple) Text: Given the location of the defect and the characteristics of the surrounding skin a purse string simple closure was deemed most appropriate.  Undermining was performed circumferentially around the surgical defect.  A purse string suture was then placed and tightened.
Partial Purse String (Intermediate) Text: Given the location of the defect and the characteristics of the surrounding skin an intermediate purse string closure was deemed most appropriate.  Undermining was performed circumferentially around the surgical defect.  A purse string suture was then placed and tightened. Wound tension of the circular defect prevented complete closure of the wound.
Partial Purse String (Simple) Text: Given the location of the defect and the characteristics of the surrounding skin a simple purse string closure was deemed most appropriate.  Undermining was performed circumferentially around the surgical defect.  A purse string suture was then placed and tightened. Wound tension of the circular defect prevented complete closure of the wound.
Complex Repair And Single Advancement Flap Text: The defect edges were debeveled with a #15 scalpel blade.  The primary defect was closed partially with a complex linear closure.  Given the location of the remaining defect, shape of the defect and the proximity to free margins a single advancement flap was deemed most appropriate for complete closure of the defect.  Using a sterile surgical marker, an appropriate advancement flap was drawn incorporating the defect and placing the expected incisions within the relaxed skin tension lines where possible.    The area thus outlined was incised deep to adipose tissue with a #15 scalpel blade.  The skin margins were undermined to an appropriate distance in all directions utilizing iris scissors.
Complex Repair And Double Advancement Flap Text: The defect edges were debeveled with a #15 scalpel blade.  The primary defect was closed partially with a complex linear closure.  Given the location of the remaining defect, shape of the defect and the proximity to free margins a double advancement flap was deemed most appropriate for complete closure of the defect.  Using a sterile surgical marker, an appropriate advancement flap was drawn incorporating the defect and placing the expected incisions within the relaxed skin tension lines where possible.    The area thus outlined was incised deep to adipose tissue with a #15 scalpel blade.  The skin margins were undermined to an appropriate distance in all directions utilizing iris scissors.
Complex Repair And Modified Advancement Flap Text: The defect edges were debeveled with a #15 scalpel blade.  The primary defect was closed partially with a complex linear closure.  Given the location of the remaining defect, shape of the defect and the proximity to free margins a modified advancement flap was deemed most appropriate for complete closure of the defect.  Using a sterile surgical marker, an appropriate advancement flap was drawn incorporating the defect and placing the expected incisions within the relaxed skin tension lines where possible.    The area thus outlined was incised deep to adipose tissue with a #15 scalpel blade.  The skin margins were undermined to an appropriate distance in all directions utilizing iris scissors.
Complex Repair And A-T Advancement Flap Text: The defect edges were debeveled with a #15 scalpel blade.  The primary defect was closed partially with a complex linear closure.  Given the location of the remaining defect, shape of the defect and the proximity to free margins an A-T advancement flap was deemed most appropriate for complete closure of the defect.  Using a sterile surgical marker, an appropriate advancement flap was drawn incorporating the defect and placing the expected incisions within the relaxed skin tension lines where possible.    The area thus outlined was incised deep to adipose tissue with a #15 scalpel blade.  The skin margins were undermined to an appropriate distance in all directions utilizing iris scissors.
Complex Repair And O-T Advancement Flap Text: The defect edges were debeveled with a #15 scalpel blade.  The primary defect was closed partially with a complex linear closure.  Given the location of the remaining defect, shape of the defect and the proximity to free margins an O-T advancement flap was deemed most appropriate for complete closure of the defect.  Using a sterile surgical marker, an appropriate advancement flap was drawn incorporating the defect and placing the expected incisions within the relaxed skin tension lines where possible.    The area thus outlined was incised deep to adipose tissue with a #15 scalpel blade.  The skin margins were undermined to an appropriate distance in all directions utilizing iris scissors.
Complex Repair And O-L Flap Text: The defect edges were debeveled with a #15 scalpel blade.  The primary defect was closed partially with a complex linear closure.  Given the location of the remaining defect, shape of the defect and the proximity to free margins an O-L flap was deemed most appropriate for complete closure of the defect.  Using a sterile surgical marker, an appropriate flap was drawn incorporating the defect and placing the expected incisions within the relaxed skin tension lines where possible.    The area thus outlined was incised deep to adipose tissue with a #15 scalpel blade.  The skin margins were undermined to an appropriate distance in all directions utilizing iris scissors.
Complex Repair And Bilobe Flap Text: The defect edges were debeveled with a #15 scalpel blade.  The primary defect was closed partially with a complex linear closure.  Given the location of the remaining defect, shape of the defect and the proximity to free margins a bilobe flap was deemed most appropriate for complete closure of the defect.  Using a sterile surgical marker, an appropriate advancement flap was drawn incorporating the defect and placing the expected incisions within the relaxed skin tension lines where possible.    The area thus outlined was incised deep to adipose tissue with a #15 scalpel blade.  The skin margins were undermined to an appropriate distance in all directions utilizing iris scissors.
Complex Repair And Melolabial Flap Text: The defect edges were debeveled with a #15 scalpel blade.  The primary defect was closed partially with a complex linear closure.  Given the location of the remaining defect, shape of the defect and the proximity to free margins a melolabial flap was deemed most appropriate for complete closure of the defect.  Using a sterile surgical marker, an appropriate advancement flap was drawn incorporating the defect and placing the expected incisions within the relaxed skin tension lines where possible.    The area thus outlined was incised deep to adipose tissue with a #15 scalpel blade.  The skin margins were undermined to an appropriate distance in all directions utilizing iris scissors.
Complex Repair And Rotation Flap Text: The defect edges were debeveled with a #15 scalpel blade.  The primary defect was closed partially with a complex linear closure.  Given the location of the remaining defect, shape of the defect and the proximity to free margins a rotation flap was deemed most appropriate for complete closure of the defect.  Using a sterile surgical marker, an appropriate advancement flap was drawn incorporating the defect and placing the expected incisions within the relaxed skin tension lines where possible.    The area thus outlined was incised deep to adipose tissue with a #15 scalpel blade.  The skin margins were undermined to an appropriate distance in all directions utilizing iris scissors.
Complex Repair And Rhombic Flap Text: The defect edges were debeveled with a #15 scalpel blade.  The primary defect was closed partially with a complex linear closure.  Given the location of the remaining defect, shape of the defect and the proximity to free margins a rhombic flap was deemed most appropriate for complete closure of the defect.  Using a sterile surgical marker, an appropriate advancement flap was drawn incorporating the defect and placing the expected incisions within the relaxed skin tension lines where possible.    The area thus outlined was incised deep to adipose tissue with a #15 scalpel blade.  The skin margins were undermined to an appropriate distance in all directions utilizing iris scissors.
Complex Repair And Transposition Flap Text: The defect edges were debeveled with a #15 scalpel blade.  The primary defect was closed partially with a complex linear closure.  Given the location of the remaining defect, shape of the defect and the proximity to free margins a transposition flap was deemed most appropriate for complete closure of the defect.  Using a sterile surgical marker, an appropriate advancement flap was drawn incorporating the defect and placing the expected incisions within the relaxed skin tension lines where possible.    The area thus outlined was incised deep to adipose tissue with a #15 scalpel blade.  The skin margins were undermined to an appropriate distance in all directions utilizing iris scissors.
Complex Repair And V-Y Plasty Text: The defect edges were debeveled with a #15 scalpel blade.  The primary defect was closed partially with a complex linear closure.  Given the location of the remaining defect, shape of the defect and the proximity to free margins a V-Y plasty was deemed most appropriate for complete closure of the defect.  Using a sterile surgical marker, an appropriate advancement flap was drawn incorporating the defect and placing the expected incisions within the relaxed skin tension lines where possible.    The area thus outlined was incised deep to adipose tissue with a #15 scalpel blade.  The skin margins were undermined to an appropriate distance in all directions utilizing iris scissors.
Complex Repair And M Plasty Text: The defect edges were debeveled with a #15 scalpel blade.  The primary defect was closed partially with a complex linear closure.  Given the location of the remaining defect, shape of the defect and the proximity to free margins an M plasty was deemed most appropriate for complete closure of the defect.  Using a sterile surgical marker, an appropriate advancement flap was drawn incorporating the defect and placing the expected incisions within the relaxed skin tension lines where possible.    The area thus outlined was incised deep to adipose tissue with a #15 scalpel blade.  The skin margins were undermined to an appropriate distance in all directions utilizing iris scissors.
Complex Repair And Double M Plasty Text: The defect edges were debeveled with a #15 scalpel blade.  The primary defect was closed partially with a complex linear closure.  Given the location of the remaining defect, shape of the defect and the proximity to free margins a double M plasty was deemed most appropriate for complete closure of the defect.  Using a sterile surgical marker, an appropriate advancement flap was drawn incorporating the defect and placing the expected incisions within the relaxed skin tension lines where possible.    The area thus outlined was incised deep to adipose tissue with a #15 scalpel blade.  The skin margins were undermined to an appropriate distance in all directions utilizing iris scissors.
Complex Repair And W Plasty Text: The defect edges were debeveled with a #15 scalpel blade.  The primary defect was closed partially with a complex linear closure.  Given the location of the remaining defect, shape of the defect and the proximity to free margins a W plasty was deemed most appropriate for complete closure of the defect.  Using a sterile surgical marker, an appropriate advancement flap was drawn incorporating the defect and placing the expected incisions within the relaxed skin tension lines where possible.    The area thus outlined was incised deep to adipose tissue with a #15 scalpel blade.  The skin margins were undermined to an appropriate distance in all directions utilizing iris scissors.
Complex Repair And Z Plasty Text: The defect edges were debeveled with a #15 scalpel blade.  The primary defect was closed partially with a complex linear closure.  Given the location of the remaining defect, shape of the defect and the proximity to free margins a Z plasty was deemed most appropriate for complete closure of the defect.  Using a sterile surgical marker, an appropriate advancement flap was drawn incorporating the defect and placing the expected incisions within the relaxed skin tension lines where possible.    The area thus outlined was incised deep to adipose tissue with a #15 scalpel blade.  The skin margins were undermined to an appropriate distance in all directions utilizing iris scissors.
Complex Repair And Dorsal Nasal Flap Text: The defect edges were debeveled with a #15 scalpel blade.  The primary defect was closed partially with a complex linear closure.  Given the location of the remaining defect, shape of the defect and the proximity to free margins a dorsal nasal flap was deemed most appropriate for complete closure of the defect.  Using a sterile surgical marker, an appropriate flap was drawn incorporating the defect and placing the expected incisions within the relaxed skin tension lines where possible.    The area thus outlined was incised deep to adipose tissue with a #15 scalpel blade.  The skin margins were undermined to an appropriate distance in all directions utilizing iris scissors.
Complex Repair And Ftsg Text: The defect edges were debeveled with a #15 scalpel blade.  The primary defect was closed partially with a complex linear closure.  Given the location of the defect, shape of the defect and the proximity to free margins a full thickness skin graft was deemed most appropriate to repair the remaining defect.  The graft was trimmed to fit the size of the remaining defect.  The graft was then placed in the primary defect, oriented appropriately, and sutured into place.
Complex Repair And Burow's Graft Text: The defect edges were debeveled with a #15 scalpel blade.  The primary defect was closed partially with a complex linear closure.  Given the location of the defect, shape of the defect, the proximity to free margins and the presence of a standing cone deformity a Burow's graft was deemed most appropriate to repair the remaining defect.  The graft was trimmed to fit the size of the remaining defect.  The graft was then placed in the primary defect, oriented appropriately, and sutured into place.
Complex Repair And Split-Thickness Skin Graft Text: The defect edges were debeveled with a #15 scalpel blade.  The primary defect was closed partially with a complex linear closure.  Given the location of the defect, shape of the defect and the proximity to free margins a split thickness skin graft was deemed most appropriate to repair the remaining defect.  The graft was trimmed to fit the size of the remaining defect.  The graft was then placed in the primary defect, oriented appropriately, and sutured into place.
Complex Repair And Epidermal Autograft Text: The defect edges were debeveled with a #15 scalpel blade.  The primary defect was closed partially with a complex linear closure.  Given the location of the defect, shape of the defect and the proximity to free margins an epidermal autograft was deemed most appropriate to repair the remaining defect.  The graft was trimmed to fit the size of the remaining defect.  The graft was then placed in the primary defect, oriented appropriately, and sutured into place.
Complex Repair And Dermal Autograft Text: The defect edges were debeveled with a #15 scalpel blade.  The primary defect was closed partially with a complex linear closure.  Given the location of the defect, shape of the defect and the proximity to free margins an dermal autograft was deemed most appropriate to repair the remaining defect.  The graft was trimmed to fit the size of the remaining defect.  The graft was then placed in the primary defect, oriented appropriately, and sutured into place.
Complex Repair And Tissue Cultured Epidermal Autograft Text: The defect edges were debeveled with a #15 scalpel blade.  The primary defect was closed partially with a complex linear closure.  Given the location of the defect, shape of the defect and the proximity to free margins an tissue cultured epidermal autograft was deemed most appropriate to repair the remaining defect.  The graft was trimmed to fit the size of the remaining defect.  The graft was then placed in the primary defect, oriented appropriately, and sutured into place.
Complex Repair And Xenograft Text: The defect edges were debeveled with a #15 scalpel blade.  The primary defect was closed partially with a complex linear closure.  Given the location of the defect, shape of the defect and the proximity to free margins a xenograft was deemed most appropriate to repair the remaining defect.  The graft was trimmed to fit the size of the remaining defect.  The graft was then placed in the primary defect, oriented appropriately, and sutured into place.
Complex Repair And Skin Substitute Graft Text: The defect edges were debeveled with a #15 scalpel blade.  The primary defect was closed partially with a complex linear closure.  Given the location of the remaining defect, shape of the defect and the proximity to free margins a skin substitute graft was deemed most appropriate to repair the remaining defect.  The graft was trimmed to fit the size of the remaining defect.  The graft was then placed in the primary defect, oriented appropriately, and sutured into place.
Path Notes (To The Dermatopathologist): Please check margins.
Consent was obtained from the patient. The risks and benefits to therapy were discussed in detail. Specifically, the risks of infection, scarring, bleeding, prolonged wound healing, incomplete removal, allergy to anesthesia, nerve injury and recurrence were addressed. Prior to the procedure, the treatment site was clearly identified and confirmed by the patient. All components of Universal Protocol/PAUSE Rule completed.
Post-Care Instructions: I reviewed with the patient in detail post-care instructions:\\n1. Apply bacitracin over the steri-strips.  \\n2. Cut non-stick pad (Telfa) to cover the steri-strips\\n3. Apply tape (hypafix) over the non-stick pad\\n4. Change once per day for 5 days\\n5. Shower with bandage on, change bandage after shower\\n\\nPatient is not to engage in any heavy lifting, exercise, hot tub, or swimming for the next 14 days. Should the patient develop any fevers, chills, bleeding, severe pain patient will contact the office immediately.
Home Suture Removal Text: Patient was provided a home suture removal kit and will remove their sutures at home.  If they have any questions or difficulties they will call the office.
Where Do You Want The Question To Include Opioid Counseling Located?: Case Summary Tab
Information: Selecting Yes will display possible errors in your note based on the variables you have selected. This validation is only offered as a suggestion for you. PLEASE NOTE THAT THE VALIDATION TEXT WILL BE REMOVED WHEN YOU FINALIZE YOUR NOTE. IF YOU WANT TO FAX A PRELIMINARY NOTE YOU WILL NEED TO TOGGLE THIS TO 'NO' IF YOU DO NOT WANT IT IN YOUR FAXED NOTE.

## 2021-11-08 NOTE — PROCEDURE: MIPS QUALITY
Detail Level: Detailed
Quality 265: Biopsy Follow-Up: Biopsy results reviewed, communicated, tracked, and documented
Quality 226: Preventive Care And Screening: Tobacco Use: Screening And Cessation Intervention: Patient screened for tobacco use and is an ex/non-smoker
Quality 130: Documentation Of Current Medications In The Medical Record: Current Medications Documented
Quality 111:Pneumonia Vaccination Status For Older Adults: Pneumococcal Vaccination Previously Received

## 2021-11-23 DIAGNOSIS — M54.50 CHRONIC LOW BACK PAIN, UNSPECIFIED BACK PAIN LATERALITY, UNSPECIFIED WHETHER SCIATICA PRESENT: ICD-10-CM

## 2021-11-23 DIAGNOSIS — M15.9 PRIMARY OSTEOARTHRITIS INVOLVING MULTIPLE JOINTS: ICD-10-CM

## 2021-11-23 DIAGNOSIS — G89.29 CHRONIC LOW BACK PAIN, UNSPECIFIED BACK PAIN LATERALITY, UNSPECIFIED WHETHER SCIATICA PRESENT: ICD-10-CM

## 2021-11-23 RX ORDER — GABAPENTIN 300 MG/1
CAPSULE ORAL
Qty: 90 CAPSULE | Refills: 0 | Status: SHIPPED | OUTPATIENT
Start: 2021-11-23 | End: 2022-02-23 | Stop reason: SDUPTHER

## 2021-11-23 NOTE — TELEPHONE ENCOUNTER
----- Message from Marry Mathur sent at 11/22/2021  5:29 PM PST -----  Regarding: Gabapentin  it is time to renew Gabapentin 300 mg.  I talked with Dr Cooper and reduced the Lyrica.  [facility] on [address] in [place] is the provider.       Received request via: Patient  Sept labs    Was the patient seen in the last year in this department? Yes    Does the patient have an active prescription (recently filled or refills available) for medication(s) requested? No

## 2021-12-05 ENCOUNTER — HOSPITAL ENCOUNTER (EMERGENCY)
Facility: MEDICAL CENTER | Age: 86
End: 2021-12-05
Attending: EMERGENCY MEDICINE
Payer: MEDICARE

## 2021-12-05 ENCOUNTER — APPOINTMENT (OUTPATIENT)
Dept: URGENT CARE | Facility: PHYSICIAN GROUP | Age: 86
End: 2021-12-05
Payer: MEDICARE

## 2021-12-05 ENCOUNTER — APPOINTMENT (OUTPATIENT)
Dept: RADIOLOGY | Facility: MEDICAL CENTER | Age: 86
End: 2021-12-05
Attending: EMERGENCY MEDICINE
Payer: MEDICARE

## 2021-12-05 ENCOUNTER — APPOINTMENT (OUTPATIENT)
Dept: RADIOLOGY | Facility: MEDICAL CENTER | Age: 86
End: 2021-12-05
Payer: MEDICARE

## 2021-12-05 VITALS
HEART RATE: 64 BPM | OXYGEN SATURATION: 93 % | WEIGHT: 163.14 LBS | DIASTOLIC BLOOD PRESSURE: 67 MMHG | TEMPERATURE: 98 F | HEIGHT: 63 IN | RESPIRATION RATE: 16 BRPM | SYSTOLIC BLOOD PRESSURE: 176 MMHG | BODY MASS INDEX: 28.91 KG/M2

## 2021-12-05 DIAGNOSIS — R19.7 DIARRHEA, UNSPECIFIED TYPE: ICD-10-CM

## 2021-12-05 LAB
ABO GROUP BLD: NORMAL
ALBUMIN SERPL BCP-MCNC: 3.9 G/DL (ref 3.2–4.9)
ALBUMIN/GLOB SERPL: 1.4 G/DL
ALP SERPL-CCNC: 49 U/L (ref 30–99)
ALT SERPL-CCNC: 29 U/L (ref 2–50)
ANION GAP SERPL CALC-SCNC: 11 MMOL/L (ref 7–16)
APTT PPP: 32.4 SEC (ref 24.7–36)
AST SERPL-CCNC: 26 U/L (ref 12–45)
BASOPHILS # BLD AUTO: 1 % (ref 0–1.8)
BASOPHILS # BLD: 0.06 K/UL (ref 0–0.12)
BILIRUB SERPL-MCNC: 0.5 MG/DL (ref 0.1–1.5)
BLD GP AB SCN SERPL QL: NORMAL
BUN SERPL-MCNC: 19 MG/DL (ref 8–22)
CALCIUM SERPL-MCNC: 9 MG/DL (ref 8.5–10.5)
CHLORIDE SERPL-SCNC: 103 MMOL/L (ref 96–112)
CO2 SERPL-SCNC: 24 MMOL/L (ref 20–33)
CREAT SERPL-MCNC: 0.74 MG/DL (ref 0.5–1.4)
EOSINOPHIL # BLD AUTO: 0.35 K/UL (ref 0–0.51)
EOSINOPHIL NFR BLD: 5.7 % (ref 0–6.9)
ERYTHROCYTE [DISTWIDTH] IN BLOOD BY AUTOMATED COUNT: 48.3 FL (ref 35.9–50)
GLOBULIN SER CALC-MCNC: 2.7 G/DL (ref 1.9–3.5)
GLUCOSE SERPL-MCNC: 93 MG/DL (ref 65–99)
HCT VFR BLD AUTO: 42 % (ref 37–47)
HGB BLD-MCNC: 14.2 G/DL (ref 12–16)
IMM GRANULOCYTES # BLD AUTO: 0.01 K/UL (ref 0–0.11)
IMM GRANULOCYTES NFR BLD AUTO: 0.2 % (ref 0–0.9)
INR PPP: 1.4 (ref 0.87–1.13)
LIPASE SERPL-CCNC: 45 U/L (ref 11–82)
LYMPHOCYTES # BLD AUTO: 1.11 K/UL (ref 1–4.8)
LYMPHOCYTES NFR BLD: 18.1 % (ref 22–41)
MCH RBC QN AUTO: 31.8 PG (ref 27–33)
MCHC RBC AUTO-ENTMCNC: 33.8 G/DL (ref 33.6–35)
MCV RBC AUTO: 94.2 FL (ref 81.4–97.8)
MONOCYTES # BLD AUTO: 0.57 K/UL (ref 0–0.85)
MONOCYTES NFR BLD AUTO: 9.3 % (ref 0–13.4)
NEUTROPHILS # BLD AUTO: 4.04 K/UL (ref 2–7.15)
NEUTROPHILS NFR BLD: 65.7 % (ref 44–72)
NRBC # BLD AUTO: 0 K/UL
NRBC BLD-RTO: 0 /100 WBC
PLATELET # BLD AUTO: 181 K/UL (ref 164–446)
PMV BLD AUTO: 11.3 FL (ref 9–12.9)
POTASSIUM SERPL-SCNC: 4.2 MMOL/L (ref 3.6–5.5)
PROT SERPL-MCNC: 6.6 G/DL (ref 6–8.2)
PROTHROMBIN TIME: 16.8 SEC (ref 12–14.6)
RBC # BLD AUTO: 4.46 M/UL (ref 4.2–5.4)
RH BLD: NORMAL
SODIUM SERPL-SCNC: 138 MMOL/L (ref 135–145)
WBC # BLD AUTO: 6.1 K/UL (ref 4.8–10.8)

## 2021-12-05 PROCEDURE — 86900 BLOOD TYPING SEROLOGIC ABO: CPT

## 2021-12-05 PROCEDURE — 83690 ASSAY OF LIPASE: CPT

## 2021-12-05 PROCEDURE — 99283 EMERGENCY DEPT VISIT LOW MDM: CPT | Mod: XU

## 2021-12-05 PROCEDURE — 86850 RBC ANTIBODY SCREEN: CPT

## 2021-12-05 PROCEDURE — 71045 X-RAY EXAM CHEST 1 VIEW: CPT

## 2021-12-05 PROCEDURE — 85610 PROTHROMBIN TIME: CPT

## 2021-12-05 PROCEDURE — 86901 BLOOD TYPING SEROLOGIC RH(D): CPT

## 2021-12-05 PROCEDURE — 85730 THROMBOPLASTIN TIME PARTIAL: CPT

## 2021-12-05 PROCEDURE — 85025 COMPLETE CBC W/AUTO DIFF WBC: CPT

## 2021-12-05 PROCEDURE — 80053 COMPREHEN METABOLIC PANEL: CPT

## 2021-12-05 ASSESSMENT — FIBROSIS 4 INDEX: FIB4 SCORE: 2.7

## 2021-12-05 NOTE — ED TRIAGE NOTES
Chief Complaint   Patient presents with   • Melena   • Diarrhea     3-4 days     Pt ambulatory to triage for above complaint. Pt reports feeling increase in weakness and lethargy last couple days and noticed her bowel movements to be black this morning. Pt denies abd pain, chest pain or sob. Pt reports liquid bowel movements for about 3-4 days.

## 2021-12-06 NOTE — ED NOTES
Pt dc'd stable. IV and monitor removed. Pt left unit via self, personal vehicle to home. Personal belongings with pt when leaving unit. Pt given discharge instructions prior to leaving unit including where to  prescriptions and when to follow-up; verbalizes understanding. Copy of discharge instructions signed and turned into DC basket and copy sent with pt.

## 2021-12-06 NOTE — ED NOTES
Med rec updated and complete. Allergies reviewed. Pt confirmed name and date of birth. Pt denies antibiotic use in last 30 days..  Pt receives a  PROLIA shot every six months. Last received 06/21/21.      [facility] pharmacy [facility] [phone]     [facility] term pharmacy [phone]

## 2021-12-06 NOTE — HOSPICE
Call to patient, Marry Walsh, to educate on Hospice services and philosophy.  Marry states understanding.  Patient is currently A&O x 4, independent in all ADL's and presently does not appear to have a terminal diagnosis.  She states that she has an appointment this morning to follow up with the pharmacist and  some medication for her diarrhea. I explained that she does not appear to be Hospice appropriate at this time but if her needs change please let us know.

## 2021-12-06 NOTE — DISCHARGE PLANNING
Received Choice form 12/5 at 1743  Agency/Facility Name: [facility]  Referral sent per Choice form @ [phone]     Agency/Facility Name: [facility]  Spoke To: Intake  Outcome: Someone from Hospice will return my call.

## 2021-12-06 NOTE — DISCHARGE PLANNING
Teams msg from [facility] that pt did not want hospice nor did hospice feel she qualified.     There is a note from Brittany who met with pt yesterday.

## 2021-12-06 NOTE — DISCHARGE INSTRUCTIONS
Buy Imodium over-the-counter to help diarrhea symptoms    Diarrhea, Adult  Diarrhea is frequent loose and watery bowel movements. Diarrhea can make you feel weak and cause you to become dehydrated. Dehydration can make you tired and thirsty, cause you to have a dry mouth, and decrease how often you urinate.  Diarrhea typically lasts 2-3 days. However, it can last longer if it is a sign of something more serious. It is important to treat your diarrhea as told by your health care provider.  Follow these instructions at home:  Eating and drinking         Follow these recommendations as told by your health care provider:  · Take an oral rehydration solution (ORS). This is an over-the-counter medicine that helps return your body to its normal balance of nutrients and water. It is found at pharmacies and retail stores.  · Drink plenty of fluids, such as water, ice chips, diluted fruit juice, and low-calorie sports drinks. You can drink milk also, if desired.  · Avoid drinking fluids that contain a lot of sugar or caffeine, such as energy drinks, sports drinks, and soda.  · Eat bland, easy-to-digest foods in small amounts as you are able. These foods include bananas, applesauce, rice, lean meats, toast, and crackers.  · Avoid alcohol.  · Avoid spicy or fatty foods.    Medicines  · Take over-the-counter and prescription medicines only as told by your health care provider.  · If you were prescribed an antibiotic medicine, take it as told by your health care provider. Do not stop using the antibiotic even if you start to feel better.  General instructions    · Wash your hands often using soap and water. If soap and water are not available, use a hand . Others in the household should wash their hands as well. Hands should be washed:  ? After using the toilet or changing a diaper.  ? Before preparing, cooking, or serving food.  ? While caring for a sick person or while visiting someone in a hospital.  · Drink enough  fluid to keep your urine pale yellow.  · Rest at home while you recover.  · Watch your condition for any changes.  · Take a warm bath to relieve any burning or pain from frequent diarrhea episodes.  · Keep all follow-up visits as told by your health care provider. This is important.  Contact a health care provider if:  · You have a fever.  · Your diarrhea gets worse.  · You have new symptoms.  · You cannot keep fluids down.  · You feel light-headed or dizzy.  · You have a headache.  · You have muscle cramps.  Get help right away if:  · You have chest pain.  · You feel extremely weak or you faint.  · You have bloody or black stools or stools that look like tar.  · You have severe pain, cramping, or bloating in your abdomen.  · You have trouble breathing or you are breathing very quickly.  · Your heart is beating very quickly.  · Your skin feels cold and clammy.  · You feel confused.  · You have signs of dehydration, such as:  ? Dark urine, very little urine, or no urine.  ? Cracked lips.  ? Dry mouth.  ? Sunken eyes.  ? Sleepiness.  ? Weakness.  Summary  · Diarrhea is frequent loose and watery bowel movements. Diarrhea can make you feel weak and cause you to become dehydrated.  · Drink enough fluids to keep your urine pale yellow.  · Make sure that you wash your hands after using the toilet. If soap and water are not available, use hand .  · Contact a health care provider if your diarrhea gets worse or you have new symptoms.  · Get help right away if you have signs of dehydration.  This information is not intended to replace advice given to you by your health care provider. Make sure you discuss any questions you have with your health care provider.  Document Released: 12/08/2003 Document Revised: 05/24/2019 Document Reviewed: 05/24/2019  Elsevier Patient Education © 2020 [employer]

## 2021-12-07 DIAGNOSIS — M05.79 RHEUMATOID ARTHRITIS INVOLVING MULTIPLE SITES WITH POSITIVE RHEUMATOID FACTOR (HCC): ICD-10-CM

## 2021-12-07 DIAGNOSIS — C43.9 MALIGNANT MELANOMA, UNSPECIFIED SITE (HCC): ICD-10-CM

## 2021-12-07 DIAGNOSIS — Z79.52 LONG TERM CURRENT USE OF SYSTEMIC STEROIDS: ICD-10-CM

## 2021-12-07 DIAGNOSIS — C18.9 ADENOCARCINOMA OF COLON (HCC): ICD-10-CM

## 2021-12-07 DIAGNOSIS — M81.0 OSTEOPOROSIS WITHOUT CURRENT PATHOLOGICAL FRACTURE, UNSPECIFIED OSTEOPOROSIS TYPE: ICD-10-CM

## 2021-12-07 DIAGNOSIS — Z79.899 LONG-TERM USE OF PLAQUENIL: ICD-10-CM

## 2021-12-07 RX ORDER — LIDOCAINE 50 MG/G
PATCH TOPICAL
Qty: 90 PATCH | Refills: 1 | Status: SHIPPED | OUTPATIENT
Start: 2021-12-07 | End: 2022-03-22 | Stop reason: SDUPTHER

## 2021-12-07 RX ORDER — LIDOCAINE 50 MG/G
PATCH TOPICAL
Qty: 90 PATCH | Refills: 1 | Status: SHIPPED | OUTPATIENT
Start: 2021-12-07 | End: 2021-12-07

## 2021-12-10 ENCOUNTER — ANTICOAGULATION VISIT (OUTPATIENT)
Dept: MEDICAL GROUP | Facility: PHYSICIAN GROUP | Age: 86
End: 2021-12-10
Payer: MEDICARE

## 2021-12-10 VITALS — HEART RATE: 62 BPM | SYSTOLIC BLOOD PRESSURE: 149 MMHG | DIASTOLIC BLOOD PRESSURE: 86 MMHG

## 2021-12-10 DIAGNOSIS — I48.91 ATRIAL FIBRILLATION, UNSPECIFIED TYPE (HCC): ICD-10-CM

## 2021-12-10 DIAGNOSIS — Z79.01 LONG TERM (CURRENT) USE OF ANTICOAGULANTS: Primary | ICD-10-CM

## 2021-12-10 PROCEDURE — 99211 OFF/OP EST MAY X REQ PHY/QHP: CPT | Performed by: INTERNAL MEDICINE

## 2021-12-10 RX ORDER — AMOXICILLIN 500 MG/1
CAPSULE ORAL
COMMUNITY
Start: 2021-10-14 | End: 2022-02-13

## 2021-12-10 RX ORDER — 1.1% SODIUM FLUORIDE PRESCRIPTION DENTAL CREAM 5 MG/G
CREAM DENTAL
COMMUNITY
Start: 2021-10-14 | End: 2022-03-22

## 2021-12-10 NOTE — PROGRESS NOTES
Anticoagulation Summary  As of 12/10/2021    INR goal:     TTR:  0.0 % (1.5 y)   INR used for dosin.40 (2021)   Warfarin maintenance plan:  No maintenance plan   Plan last modified:  Steve Hahn, PharmD (2020)   Next INR check:  6/10/2022   Target end date:  Indefinite    Indications    Long term (current) use of anticoagulants [Z79.01]  Deep vein thrombosis (HCC) [I82.409]  Atrial fibrillation [I48.91] [I48.91]             Anticoagulation Episode Summary     INR check location:      Preferred lab:      Send INR reminders to:      Comments:        Anticoagulation Care Providers     Provider Role Specialty Phone number    Davon Waters M.D. Referring Internal Medicine [phone]    [facility] Responsible  [phone]        Anticoagulation Patient Findings       Target end date:Indefinite     Indication: DVT and A-fib     Drug: Xarelto     CHADsVASC = at least 4    Health Status Since Last Assessment   Patient denies any new relevant medical problems, ED visits or hospitalizations   Patient denies any embolic events (stroke/tia/systemic embolism)    Adherence with DOAC Therapy   Pt has No missed any doses in the average week    Bleeding Risk Assessment     Denies Epistaxis   Pt denies any excessive or unusual bleeding/hematomas.  Pt denies any GI bleeds or hematemesis.  Pt denies any concerning daily headache or sub dural hematoma symptoms.     Pt denies any hematuria   Latest Hemoglobin 14.2   ETOH overuse Negative     Creatinine Clearance/Renal Function     Latest ClCr >50 mL/min    Hepatic function   Latest LFTs WNL   Pt denies any history of liver dysfunction      Drug Interactions   Platelets: 181   ASA/other antiplatelets Negative   NSAID Negative   Other drug interactions Negative   X Verified no anticonvulsant or azole therapy, education provided for future use.     Examination   Blood Pressure   Vitals:    12/10/21 1642   BP: 149/86   Pulse: 62        Symptomatic  hypotension Negative   Significant gait impairment/imbalance/high risk for falls? Negative    Final Assessment and Recommendations:   Patient appears stable from the anticoagulation standpoint.     Benefits of continued DOAC therapy outweigh risks for this patient   Recommend pt continue with current DOAC therapy Xarelto 20mg QD.   DOAC is affordable     Other Actions: cmp/ cbc hemogram ordered prior to next visit    Follow up:   Will follow up with patient 6  months.      Steve Hahn, PharmD, BCACP

## 2021-12-13 ENCOUNTER — APPOINTMENT (RX ONLY)
Dept: URBAN - METROPOLITAN AREA CLINIC 36 | Facility: CLINIC | Age: 86
Setting detail: DERMATOLOGY
End: 2021-12-13

## 2021-12-13 DIAGNOSIS — Z86.006 PERSONAL HISTORY OF MELANOMA IN-SITU: ICD-10-CM

## 2021-12-13 PROBLEM — C44.319 BASAL CELL CARCINOMA OF SKIN OF OTHER PARTS OF FACE: Status: ACTIVE | Noted: 2021-12-13

## 2021-12-13 PROBLEM — Z85.820 PERSONAL HISTORY OF MALIGNANT MELANOMA OF SKIN: Status: ACTIVE | Noted: 2021-12-13

## 2021-12-13 PROCEDURE — 17312 MOHS ADDL STAGE: CPT

## 2021-12-13 PROCEDURE — 99212 OFFICE O/P EST SF 10 MIN: CPT | Mod: 25

## 2021-12-13 PROCEDURE — 13132 CMPLX RPR F/C/C/M/N/AX/G/H/F: CPT

## 2021-12-13 PROCEDURE — ? MOHS SURGERY

## 2021-12-13 PROCEDURE — 17311 MOHS 1 STAGE H/N/HF/G: CPT

## 2021-12-13 PROCEDURE — ? COUNSELING

## 2021-12-13 NOTE — PROCEDURE: MOHS SURGERY
Mohs Case Number: [id number]
Previous Accession (Optional): [id number]
Biopsy Photograph Reviewed: Yes
Referring Physician (Optional): Ayesha
Consent Type: Consent 1 (Standard)
Eye Shield Used: No
Surgeon Performing Repair (Optional): Melly
Initial Size Of Lesion: 0.4
Number Of Stages: 2
Primary Defect Length In Cm (Final Defect Size - Required For Flaps/Grafts): 1
Primary Defect Width In Cm (Final Defect Size - Required For Flaps/Grafts): 0.9
Repair Type: Complex Repair
Oculoplastic Surgeon (A): Minh
Oculoplastic Surgeon Procedure Text (A): After obtaining clear surgical margins the patient was sent to oculoplastics for surgical repair.  The patient understands they will receive post-surgical care and follow-up from the referring physician's office.
Otolaryngologist Procedure Text (A): After obtaining clear surgical margins the patient was sent to otolaryngology for surgical repair.  The patient understands they will receive post-surgical care and follow-up from the referring physician's office.
Plastic Surgeon Procedure Text (A): After obtaining clear surgical margins the patient was sent to plastics for surgical repair.  The patient understands they will receive post-surgical care and follow-up from the referring physician's office.
Mid-Level Procedure Text (A): After obtaining clear surgical margins the patient was sent to a mid-level provider for surgical repair.  The patient understands they will receive post-surgical care and follow-up from the mid-level provider.
Provider Procedure Text (A): After obtaining clear surgical margins the defect was repaired by another provider.
Asc Procedure Text (A): After obtaining clear surgical margins the patient was sent to an ASC for surgical repair.  The patient understands they will receive post-surgical care and follow-up from the ASC physician.
Simple / Intermediate / Complex Repair - Final Wound Length In Cm: 3.4
Suturegard Retention Suture: 2-0 Nylon
Retention Suture Bite Size: 3 mm
Length To Time In Minutes Device Was In Place: 10
Distance Of Undermining In Cm (Required): 1.5
Undermining Type: Entire Wound
Debridement Text: The wound edges were debrided prior to proceeding with the closure to facilitate wound healing.
Helical Rim Text: The closure involved the helical rim.
Vermilion Border Text: The closure involved the vermilion border.
Nostril Rim Text: The closure involved the nostril rim.
Retention Suture Text: Retention sutures were placed to support the closure and prevent dehiscence.
Secondary Defect Length In Cm (Required For Flaps): 0
Area H Indication Text: Tumors in this location are included in Area H (eyelids, eyebrows, nose, lips, chin, ear, pre-auricular, post-auricular, temple, genitalia, hands, feet, ankles and areola).  Tissue conservation is critical in these anatomic locations.
Area M Indication Text: Tumors in this location are included in Area M (cheek, forehead, scalp, neck, jawline and pretibial skin).  Mohs surgery is indicated for tumors in these anatomic locations.
Area L Indication Text: Tumors in this location are included in Area L (trunk and extremities).  Mohs surgery is indicated for larger tumors, or tumors with aggressive histologic features, in these anatomic locations.
Surgical Defect Length In Cm (Optional): 0.7
Special Stains Stage 1 - Results: Base On Clearance Noted Above
Stage 2: Additional Anesthesia Type: 1% lidocaine with 1:100,000 epinephrine and 408mcg clindamycin/ml and a 1:10 solution of 8.4% sodium bicarbonate
Surgical Defect Length In Cm (Optional): 1.0
Stage 4: Additional Anesthesia Type: 1% lidocaine with epinephrine
Include Tumor Staging In Mohs Note?: Please Select the Appropriate Response
Staging Info: By selecting yes to the question above you will include information on AJCC 8 tumor staging in your Mohs note. Information on tumor staging will be automatically added for SCCs on the head and neck. AJCC 8 includes tumor size, tumor depth, perineural involvement and bone invasion.
Tumor Depth: Less than 6mm from granular layer and no invasion beyond the subcutaneous fat
Medical Necessity Statement: Based on my medical judgement, Mohs surgery is the most appropriate treatment for this cancer compared to other treatments.
Alternatives Discussed Intro (Do Not Add Period): I discussed alternative treatments to Mohs surgery and specifically discussed the risks and benefits of
Consent 1/Introductory Paragraph: The rationale for Mohs was explained to the patient and consent was obtained. The risks, benefits and alternatives to therapy were discussed in detail. Specifically, the risks of infection, scarring, bleeding, prolonged wound healing, incomplete removal, allergy to anesthesia, nerve injury and recurrence were addressed. Prior to the procedure, the treatment site was clearly identified and confirmed by the patient. All components of Universal Protocol/PAUSE Rule completed.
Consent 2/Introductory Paragraph: Mohs surgery was explained to the patient and consent was obtained. The risks, benefits and alternatives to therapy were discussed in detail. Specifically, the risks of infection, scarring, bleeding, prolonged wound healing, incomplete removal, allergy to anesthesia, nerve injury and recurrence were addressed. Prior to the procedure, the treatment site was clearly identified and confirmed by the patient. All components of Universal Protocol/PAUSE Rule completed.
Consent 3/Introductory Paragraph: I gave the patient a chance to ask questions they had about the procedure.  Following this I explained the Mohs procedure and consent was obtained. The risks, benefits and alternatives to therapy were discussed in detail. Specifically, the risks of infection, scarring, bleeding, prolonged wound healing, incomplete removal, allergy to anesthesia, nerve injury and recurrence were addressed. Prior to the procedure, the treatment site was clearly identified and confirmed by the patient. All components of Universal Protocol/PAUSE Rule completed.
Consent (Temporal Branch)/Introductory Paragraph: The rationale for Mohs was explained to the patient and consent was obtained. The risks, benefits and alternatives to therapy were discussed in detail. Specifically, the risks of damage to the temporal branch of the facial nerve, infection, scarring, bleeding, prolonged wound healing, incomplete removal, allergy to anesthesia, and recurrence were addressed. Prior to the procedure, the treatment site was clearly identified and confirmed by the patient. All components of Universal Protocol/PAUSE Rule completed.
Consent (Marginal Mandibular)/Introductory Paragraph: The rationale for Mohs was explained to the patient and consent was obtained. The risks, benefits and alternatives to therapy were discussed in detail. Specifically, the risks of damage to the marginal mandibular branch of the facial nerve, infection, scarring, bleeding, prolonged wound healing, incomplete removal, allergy to anesthesia, and recurrence were addressed. Prior to the procedure, the treatment site was clearly identified and confirmed by the patient. All components of Universal Protocol/PAUSE Rule completed.
Consent (Spinal Accessory)/Introductory Paragraph: The rationale for Mohs was explained to the patient and consent was obtained. The risks, benefits and alternatives to therapy were discussed in detail. Specifically, the risks of damage to the spinal accessory nerve, infection, scarring, bleeding, prolonged wound healing, incomplete removal, allergy to anesthesia, and recurrence were addressed. Prior to the procedure, the treatment site was clearly identified and confirmed by the patient. All components of Universal Protocol/PAUSE Rule completed.
Consent (Near Eyelid Margin)/Introductory Paragraph: The rationale for Mohs was explained to the patient and consent was obtained. The risks, benefits and alternatives to therapy were discussed in detail. Specifically, the risks of ectropion or eyelid deformity, infection, scarring, bleeding, prolonged wound healing, incomplete removal, allergy to anesthesia, nerve injury and recurrence were addressed. Prior to the procedure, the treatment site was clearly identified and confirmed by the patient. All components of Universal Protocol/PAUSE Rule completed.
Consent (Ear)/Introductory Paragraph: The rationale for Mohs was explained to the patient and consent was obtained. The risks, benefits and alternatives to therapy were discussed in detail. Specifically, the risks of ear deformity, infection, scarring, bleeding, prolonged wound healing, incomplete removal, allergy to anesthesia, nerve injury and recurrence were addressed. Prior to the procedure, the treatment site was clearly identified and confirmed by the patient. All components of Universal Protocol/PAUSE Rule completed.
Consent (Nose)/Introductory Paragraph: The rationale for Mohs was explained to the patient and consent was obtained. The risks, benefits and alternatives to therapy were discussed in detail. Specifically, the risks of nasal deformity, changes in the flow of air through the nose, infection, scarring, bleeding, prolonged wound healing, incomplete removal, allergy to anesthesia, nerve injury and recurrence were addressed. Prior to the procedure, the treatment site was clearly identified and confirmed by the patient. All components of Universal Protocol/PAUSE Rule completed.
Consent (Lip)/Introductory Paragraph: The rationale for Mohs was explained to the patient and consent was obtained. The risks, benefits and alternatives to therapy were discussed in detail. Specifically, the risks of lip deformity, changes in the oral aperture, infection, scarring, bleeding, prolonged wound healing, incomplete removal, allergy to anesthesia, nerve injury and recurrence were addressed. Prior to the procedure, the treatment site was clearly identified and confirmed by the patient. All components of Universal Protocol/PAUSE Rule completed.
Consent (Scalp)/Introductory Paragraph: The rationale for Mohs was explained to the patient and consent was obtained. The risks, benefits and alternatives to therapy were discussed in detail. Specifically, the risks of changes in hair growth pattern secondary to repair, infection, scarring, bleeding, prolonged wound healing, incomplete removal, allergy to anesthesia, nerve injury and recurrence were addressed. Prior to the procedure, the treatment site was clearly identified and confirmed by the patient. All components of Universal Protocol/PAUSE Rule completed.
Detail Level: Detailed
Postop Diagnosis: same
Anesthesia Type: 0.5% lidocaine with 1:200,000 epinephrine and a 1:10 solution of 8.4% sodium bicarbonate and 408mcg clindamycin/ml
Anesthesia Volume In Cc: 6
Hemostasis: Electrocautery
Estimated Blood Loss (Cc): less than 5 cc
Repair Anesthesia Method: local infiltration
Brow Lift Text: A midfrontal incision was made medially to the defect to allow access to the tissues just superior to the left eyebrow. Following careful dissection inferiorly in a supraperiosteal plane to the level of the left eyebrow, several 3-0 monocryl sutures were used to resuspend the eyebrow orbicularis oculi muscular unit to the superior frontal bone periosteum. This resulted in an appropriate reapproximation of static eyebrow symmetry and correction of the left brow ptosis.
Deep Sutures: 5-0 Polysorb
Epidermal Sutures: 5-0 Prolene
Epidermal Closure: running cuticular
Suturegard Intro: Intraoperative tissue expansion was performed, utilizing the SUTUREGARD device, in order to reduce wound tension.
Suturegard Body: The suture ends were repeatedly re-tightened and re-clamped to achieve the desired tissue expansion.
Hemigard Intro: Due to skin fragility and wound tension, it was decided to use HEMIGARD adhesive retention suture devices to permit a linear closure. The skin was cleaned and dried for a 6cm distance away from the wound. Excessive hair, if present, was removed to allow for adhesion.
Hemigard Postcare Instructions: The HEMIGARD strips are to remain completely dry for at least 5-7 days.
Donor Site Anesthesia Type: same as repair anesthesia
Graft Basting Suture (Optional): 5-0 Fast Absorbing Gut
Graft Donor Site Epidermal Sutures (Optional): 5-0 Ethibond
Epidermal Closure Graft Donor Site (Optional): simple interrupted
Graft Donor Site Bandage (Optional-Leave Blank If You Don't Want In Note): Aquaphor and telefa placed on wound. Pressure dressing applied to donor site
Closure 2 Information: This tab is for additional flaps and grafts, including complex repair and grafts and complex repair and flaps. You can also specify a different location for the additional defect, if the location is the same you do not need to select a new one. We will insert the automated text for the repair you select below just as we do for solitary flaps and grafts. Please note that at this time if you select a location with a different insurance zone you will need to override the ICD10 and CPT if appropriate.
Closure 3 Information: This tab is for additional flaps and grafts above and beyond our usual structured repairs.  Please note if you enter information here it will not currently bill and you will need to add the billing information manually.
Wound Care: Aquaphor
Dressing: dry sterile dressing
Wound Care (No Sutures): Petrolatum
Suture Removal: 7 days
Unna Boot Text: An Unna boot was placed to help immobilize the limb and facilitate more rapid healing.
Home Suture Removal Text: Patient was provided instructions on removing sutures and will remove their sutures at home.  If they have any questions or difficulties they will call the office.
Post-Care Instructions: I reviewed with the patient in detail post-care instructions. Patient is not to engage in any heavy lifting, exercise, or swimming for the next 14 days. Should the patient develop any fevers, chills, bleeding, severe pain patient will contact the office immediately.
Pain Refusal Text: I offered to prescribe pain medication but the patient refused to take this medication.
Mauc Instructions: By selecting yes to the question below the MAUC number will be added into the note.  This will be calculated automatically based on the diagnosis chosen, the size entered, the body zone selected (H,M,L) and the specific indications you chose. You will also have the option to override the Mohs AUC if you disagree with the automatically calculated number and this option is found in the Case Summary tab.
Where Do You Want The Question To Include Opioid Counseling Located?: Case Summary Tab
Eye Protection Verbiage: Before proceeding with the stage, a plastic scleral shield was inserted. The globe was anesthetized with a few drops of 1% lidocaine with 1:100,000 epinephrine. Then, an appropriate sized scleral shield was chosen and coated with lacrilube ointment. The shield was gently inserted and left in place for the duration of each stage. After the stage was completed, the shield was gently removed.
Mohs Method Verbiage: An incision at a 45 degree angle following the standard Mohs approach was done and the specimen was harvested as a microscopic controlled layer.
Surgeon/Pathologist Verbiage (Will Incorporate Name Of Surgeon From Intro If Not Blank): operated in two distinct and integrated capacities as the surgeon and pathologist.
Mohs Histo Method Verbiage: Each section was then chromacoded and processed in the Mohs lab using the Mohs protocol and submitted for frozen section.
Subsequent Stages Histo Method Verbiage: Using a similar technique to that described above, a thin layer of tissue was removed from all areas where tumor was visible on the previous stage.  The tissue was again oriented, mapped, dyed, and processed as above.
Mohs Rapid Report Verbiage: The area of clinically evident tumor was marked with skin marking ink and appropriately hatched.  The initial incision was made following the Mohs approach through the skin.  The specimen was taken to the lab, divided into the necessary number of pieces, chromacoded and processed according to the Mohs protocol.  This was repeated in successive stages until a tumor free defect was achieved.
Complex Repair Preamble Text (Leave Blank If You Do Not Want): Extensive wide undermining was performed at least 2 cm in all directions.
Intermediate Repair Preamble Text (Leave Blank If You Do Not Want): Undermining was performed with blunt dissection.
M-Plasty Complex Repair Preamble Text (Leave Blank If You Do Not Want): Extensive wide undermining was performed.
Non-Graft Cartilage Fenestration Text: The cartilage was fenestrated with a 2mm punch biopsy to help facilitate healing.
Graft Cartilage Fenestration Text: The cartilage was fenestrated with a 2mm punch biopsy to help facilitate graft survival and healing.
Secondary Intention Text (Leave Blank If You Do Not Want): The defect will heal with secondary intention.
No Repair - Repaired With Adjacent Surgical Defect Text (Leave Blank If You Do Not Want): After obtaining clear surgical margins the defect was repaired concurrently with another surgical defect which was in close approximation.
Adjacent Tissue Transfer Text: The defect edges were debeveled with a #15 scalpel blade.  Given the location of the defect and the proximity to free margins an adjacent tissue transfer was deemed most appropriate.  Using a sterile surgical marker, an appropriate flap was drawn incorporating the defect and placing the expected incisions within the relaxed skin tension lines where possible.    The area thus outlined was incised deep to adipose tissue with a #15 scalpel blade.  The skin margins were undermined to an appropriate distance in all directions utilizing iris scissors.
Advancement Flap (Single) Text: The defect edges were debeveled with a #15 scalpel blade.  Given the location of the defect and the proximity to free margins a single advancement flap was deemed most appropriate.  Using a sterile surgical marker, an appropriate advancement flap was drawn incorporating the defect and placing the expected incisions within the relaxed skin tension lines where possible.    The area thus outlined was incised deep to adipose tissue with a #15 scalpel blade.  The skin margins were undermined to an appropriate distance in all directions utilizing iris scissors.
Advancement Flap (Double) Text: The defect edges were debeveled with a #15 scalpel blade.  Given the location of the defect and the proximity to free margins a double advancement flap was deemed most appropriate.  Using a sterile surgical marker, the appropriate advancement flaps were drawn incorporating the defect and placing the expected incisions within the relaxed skin tension lines where possible.    The area thus outlined was incised deep to adipose tissue with a #15 scalpel blade.  The skin margins were undermined to an appropriate distance in all directions utilizing iris scissors.
Burow's Advancement Flap Text: The defect edges were debeveled with a #15 scalpel blade.  Given the location of the defect and the proximity to free margins a Burow's advancement flap was deemed most appropriate.  Using a sterile surgical marker, the appropriate advancement flap was drawn incorporating the defect and placing the expected incisions within the relaxed skin tension lines where possible.    The area thus outlined was incised deep to adipose tissue with a #15 scalpel blade.  The skin margins were undermined to an appropriate distance in all directions utilizing iris scissors.
Chonodrocutaneous Helical Advancement Flap Text: The defect edges were debeveled with a #15 scalpel blade.  Given the location of the defect and the proximity to free margins a chondrocutaneous helical advancement flap was deemed most appropriate.  Using a sterile surgical marker, the appropriate advancement flap was drawn incorporating the defect and placing the expected incisions within the relaxed skin tension lines where possible.    The area thus outlined was incised deep to adipose tissue with a #15 scalpel blade.  The skin margins were undermined to an appropriate distance in all directions utilizing iris scissors.
Crescentic Advancement Flap Text: The defect edges were debeveled with a #15 scalpel blade.  Given the location of the defect and the proximity to free margins a crescentic advancement flap was deemed most appropriate.  Using a sterile surgical marker, the appropriate advancement flap was drawn incorporating the defect and placing the expected incisions within the relaxed skin tension lines where possible.    The area thus outlined was incised deep to adipose tissue with a #15 scalpel blade.  The skin margins were undermined to an appropriate distance in all directions utilizing iris scissors.
A-T Advancement Flap Text: The defect edges were debeveled with a #15 scalpel blade.  Given the location of the defect, shape of the defect and the proximity to free margins an A-T advancement flap was deemed most appropriate.  Using a sterile surgical marker, an appropriate advancement flap was drawn incorporating the defect and placing the expected incisions within the relaxed skin tension lines where possible.    The area thus outlined was incised deep to adipose tissue with a #15 scalpel blade.  The skin margins were undermined to an appropriate distance in all directions utilizing iris scissors.
O-T Advancement Flap Text: The defect edges were debeveled with a #15 scalpel blade.  Given the location of the defect, shape of the defect and the proximity to free margins an O-T advancement flap was deemed most appropriate.  Using a sterile surgical marker, an appropriate advancement flap was drawn incorporating the defect and placing the expected incisions within the relaxed skin tension lines where possible.    The area thus outlined was incised deep to adipose tissue with a #15 scalpel blade.  The skin margins were undermined to an appropriate distance in all directions utilizing iris scissors.
O-L Flap Text: The defect edges were debeveled with a #15 scalpel blade.  Given the location of the defect, shape of the defect and the proximity to free margins an O-L flap was deemed most appropriate.  Using a sterile surgical marker, an appropriate advancement flap was drawn incorporating the defect and placing the expected incisions within the relaxed skin tension lines where possible.    The area thus outlined was incised deep to adipose tissue with a #15 scalpel blade.  The skin margins were undermined to an appropriate distance in all directions utilizing iris scissors.
O-Z Flap Text: The defect edges were debeveled with a #15 scalpel blade.  Given the location of the defect, shape of the defect and the proximity to free margins an O-Z flap was deemed most appropriate.  Using a sterile surgical marker, an appropriate transposition flap was drawn incorporating the defect and placing the expected incisions within the relaxed skin tension lines where possible. The area thus outlined was incised deep to adipose tissue with a #15 scalpel blade.  The skin margins were undermined to an appropriate distance in all directions utilizing iris scissors.
Double O-Z Flap Text: The defect edges were debeveled with a #15 scalpel blade.  Given the location of the defect, shape of the defect and the proximity to free margins a Double O-Z flap was deemed most appropriate.  Using a sterile surgical marker, an appropriate transposition flap was drawn incorporating the defect and placing the expected incisions within the relaxed skin tension lines where possible. The area thus outlined was incised deep to adipose tissue with a #15 scalpel blade.  The skin margins were undermined to an appropriate distance in all directions utilizing iris scissors.
V-Y Flap Text: The defect edges were debeveled with a #15 scalpel blade.  Given the location of the defect, shape of the defect and the proximity to free margins a V-Y flap was deemed most appropriate.  Using a sterile surgical marker, an appropriate advancement flap was drawn incorporating the defect and placing the expected incisions within the relaxed skin tension lines where possible.    The area thus outlined was incised deep to adipose tissue with a #15 scalpel blade.  The skin margins were undermined to an appropriate distance in all directions utilizing iris scissors.
Advancement-Rotation Flap Text: The defect edges were debeveled with a #15 scalpel blade.  Given the location of the defect, shape of the defect and the proximity to free margins an advancement-rotation flap was deemed most appropriate.  Using a sterile surgical marker, an appropriate flap was drawn incorporating the defect and placing the expected incisions within the relaxed skin tension lines where possible. The area thus outlined was incised deep to adipose tissue with a #15 scalpel blade.  The skin margins were undermined to an appropriate distance in all directions utilizing iris scissors.
Mercedes Flap Text: The defect edges were debeveled with a #15 scalpel blade.  Given the location of the defect, shape of the defect and the proximity to free margins a Mercedes flap was deemed most appropriate.  Using a sterile surgical marker, an appropriate advancement flap was drawn incorporating the defect and placing the expected incisions within the relaxed skin tension lines where possible. The area thus outlined was incised deep to adipose tissue with a #15 scalpel blade.  The skin margins were undermined to an appropriate distance in all directions utilizing iris scissors.
Modified Advancement Flap Text: The defect edges were debeveled with a #15 scalpel blade.  Given the location of the defect, shape of the defect and the proximity to free margins a modified advancement flap was deemed most appropriate.  Using a sterile surgical marker, an appropriate advancement flap was drawn incorporating the defect and placing the expected incisions within the relaxed skin tension lines where possible.    The area thus outlined was incised deep to adipose tissue with a #15 scalpel blade.  The skin margins were undermined to an appropriate distance in all directions utilizing iris scissors.
Mucosal Advancement Flap Text: Given the location of the defect, shape of the defect and the proximity to free margins a mucosal advancement flap was deemed most appropriate. Incisions were made with a 15 blade scalpel in the appropriate fashion along the cutaneous vermilion border and the mucosal lip. The remaining actinically damaged mucosal tissue was excised.  The mucosal advancement flap was then elevated to the gingival sulcus with care taken to preserve the neurovascular structures and advanced into the primary defect. Care was taken to ensure that precise realignment of the vermilion border was achieved.
Peng Advancement Flap Text: The defect edges were debeveled with a #15 scalpel blade.  Given the location of the defect, shape of the defect and the proximity to free margins a Peng advancement flap was deemed most appropriate.  Using a sterile surgical marker, an appropriate advancement flap was drawn incorporating the defect and placing the expected incisions within the relaxed skin tension lines where possible. The area thus outlined was incised deep to adipose tissue with a #15 scalpel blade.  The skin margins were undermined to an appropriate distance in all directions utilizing iris scissors.
Hatchet Flap Text: The defect edges were debeveled with a #15 scalpel blade.  Given the location of the defect, shape of the defect and the proximity to free margins a hatchet flap based from the glabella was deemed most appropriate.  Using a sterile surgical marker, an appropriate glabellar hatchet flap was drawn incorporating the defect and placing the expected incisions within the relaxed skin tension lines where possible.    The area thus outlined was incised deep to adipose tissue with a #15 scalpel blade.  The skin margins were undermined to an appropriate distance in all directions utilizing iris scissors.
Rotation Flap Text: The defect edges were debeveled with a #15 scalpel blade.  Given the location of the defect, shape of the defect and the proximity to free margins a rotation flap was deemed most appropriate.  Using a sterile surgical marker, an appropriate rotation flap was drawn incorporating the defect and placing the expected incisions within the relaxed skin tension lines where possible.    The area thus outlined was incised deep to adipose tissue with a #15 scalpel blade.  The skin margins were undermined to an appropriate distance in all directions utilizing iris scissors.
Spiral Flap Text: The defect edges were debeveled with a #15 scalpel blade.  Given the location of the defect, shape of the defect and the proximity to free margins a spiral flap was deemed most appropriate.  Using a sterile surgical marker, an appropriate rotation flap was drawn incorporating the defect and placing the expected incisions within the relaxed skin tension lines where possible. The area thus outlined was incised deep to adipose tissue with a #15 scalpel blade.  The skin margins were undermined to an appropriate distance in all directions utilizing iris scissors.
Staged Advancement Flap Text: The defect edges were debeveled with a #15 scalpel blade.  Given the location of the defect, shape of the defect and the proximity to free margins a staged advancement flap was deemed most appropriate.  Using a sterile surgical marker, an appropriate advancement flap was drawn incorporating the defect and placing the expected incisions within the relaxed skin tension lines where possible. The area thus outlined was incised deep to adipose tissue with a #15 scalpel blade.  The skin margins were undermined to an appropriate distance in all directions utilizing iris scissors.
Star Wedge Flap Text: The defect edges were debeveled with a #15 scalpel blade.  Given the location of the defect, shape of the defect and the proximity to free margins a star wedge flap was deemed most appropriate.  Using a sterile surgical marker, an appropriate rotation flap was drawn incorporating the defect and placing the expected incisions within the relaxed skin tension lines where possible. The area thus outlined was incised deep to adipose tissue with a #15 scalpel blade.  The skin margins were undermined to an appropriate distance in all directions utilizing iris scissors.
Transposition Flap Text: The defect edges were debeveled with a #15 scalpel blade.  Given the location of the defect and the proximity to free margins a transposition flap was deemed most appropriate.  Using a sterile surgical marker, an appropriate transposition flap was drawn incorporating the defect.    The area thus outlined was incised deep to adipose tissue with a #15 scalpel blade.  The skin margins were undermined to an appropriate distance in all directions utilizing iris scissors.
Muscle Hinge Flap Text: The defect edges were debeveled with a #15 scalpel blade.  Given the size, depth and location of the defect and the proximity to free margins a muscle hinge flap was deemed most appropriate.  Using a sterile surgical marker, an appropriate hinge flap was drawn incorporating the defect. The area thus outlined was incised with a #15 scalpel blade.  The skin margins were undermined to an appropriate distance in all directions utilizing iris scissors.
Mustarde Flap Text: The defect edges were debeveled with a #15 scalpel blade.  Given the size, depth and location of the defect and the proximity to free margins a Mustarde flap was deemed most appropriate.  Using a sterile surgical marker, an appropriate flap was drawn incorporating the defect. The area thus outlined was incised with a #15 scalpel blade.  The skin margins were undermined to an appropriate distance in all directions utilizing iris scissors.
Nasal Turnover Hinge Flap Text: The defect edges were debeveled with a #15 scalpel blade.  Given the size, depth, location of the defect and the defect being full thickness a nasal turnover hinge flap was deemed most appropriate.  Using a sterile surgical marker, an appropriate hinge flap was drawn incorporating the defect. The area thus outlined was incised with a #15 scalpel blade. The flap was designed to recreate the nasal mucosal lining and the alar rim. The skin margins were undermined to an appropriate distance in all directions utilizing iris scissors.
Nasalis-Muscle-Based Myocutaneous Island Pedicle Flap Text: Using a #15 blade, an incision was made around the donor flap to the level of the nasalis muscle. Wide lateral undermining was then performed in both the subcutaneous plane above the nasalis muscle, and in a submuscular plane just above periosteum. This allowed the formation of a free nasalis muscle axial pedicle (based on the angular artery) which was still attached to the actual cutaneous flap, increasing its mobility and vascular viability. Hemostasis was obtained with pinpoint electrocoagulation. The flap was mobilized into position and the pivotal anchor points positioned and stabilized with buried interrupted sutures. Subcutaneous and dermal tissues were closed in a multilayered fashion with sutures. Tissue redundancies were excised, and the epidermal edges were apposed without significant tension and sutured with sutures.
Orbicularis Oris Muscle Flap Text: The defect edges were debeveled with a #15 scalpel blade.  Given that the defect affected the competency of the oral sphincter an orbicularis oris muscle flap was deemed most appropriate to restore this competency and normal muscle function.  Using a sterile surgical marker, an appropriate flap was drawn incorporating the defect. The area thus outlined was incised with a #15 scalpel blade.
Melolabial Transposition Flap Text: The defect edges were debeveled with a #15 scalpel blade.  Given the location of the defect and the proximity to free margins a melolabial flap was deemed most appropriate.  Using a sterile surgical marker, an appropriate melolabial transposition flap was drawn incorporating the defect.    The area thus outlined was incised deep to adipose tissue with a #15 scalpel blade.  The skin margins were undermined to an appropriate distance in all directions utilizing iris scissors.
Rhombic Flap Text: The defect edges were debeveled with a #15 scalpel blade.  Given the location of the defect and the proximity to free margins a rhombic flap was deemed most appropriate.  Using a sterile surgical marker, an appropriate rhombic flap was drawn incorporating the defect.    The area thus outlined was incised deep to adipose tissue with a #15 scalpel blade.  The skin margins were undermined to an appropriate distance in all directions utilizing iris scissors.
Rhomboid Transposition Flap Text: The defect edges were debeveled with a #15 scalpel blade.  Given the location of the defect and the proximity to free margins a rhomboid transposition flap was deemed most appropriate.  Using a sterile surgical marker, an appropriate rhomboid flap was drawn incorporating the defect.    The area thus outlined was incised deep to adipose tissue with a #15 scalpel blade.  The skin margins were undermined to an appropriate distance in all directions utilizing iris scissors.
Bi-Rhombic Flap Text: The defect edges were debeveled with a #15 scalpel blade.  Given the location of the defect and the proximity to free margins a bi-rhombic flap was deemed most appropriate.  Using a sterile surgical marker, an appropriate rhombic flap was drawn incorporating the defect. The area thus outlined was incised deep to adipose tissue with a #15 scalpel blade.  The skin margins were undermined to an appropriate distance in all directions utilizing iris scissors.
Helical Rim Advancement Flap Text: The defect edges were debeveled with a #15 blade scalpel.  Given the location of the defect and the proximity to free margins (helical rim) a double helical rim advancement flap was deemed most appropriate.  Using a sterile surgical marker, the appropriate advancement flaps were drawn incorporating the defect and placing the expected incisions between the helical rim and antihelix where possible.  The area thus outlined was incised through and through with a #15 scalpel blade.  With a skin hook and iris scissors, the flaps were gently and sharply undermined and freed up.
Bilateral Helical Rim Advancement Flap Text: The defect edges were debeveled with a #15 blade scalpel.  Given the location of the defect and the proximity to free margins (helical rim) a bilateral helical rim advancement flap was deemed most appropriate.  Using a sterile surgical marker, the appropriate advancement flaps were drawn incorporating the defect and placing the expected incisions between the helical rim and antihelix where possible.  The area thus outlined was incised through and through with a #15 scalpel blade.  With a skin hook and iris scissors, the flaps were gently and sharply undermined and freed up.
Ear Star Wedge Flap Text: The defect edges were debeveled with a #15 blade scalpel.  Given the location of the defect and the proximity to free margins (helical rim) an ear star wedge flap was deemed most appropriate.  Using a sterile surgical marker, the appropriate flap was drawn incorporating the defect and placing the expected incisions between the helical rim and antihelix where possible.  The area thus outlined was incised through and through with a #15 scalpel blade.
Banner Transposition Flap Text: The defect edges were debeveled with a #15 scalpel blade.  Given the location of the defect and the proximity to free margins a Banner transposition flap was deemed most appropriate.  Using a sterile surgical marker, an appropriate flap drawn around the defect. The area thus outlined was incised deep to adipose tissue with a #15 scalpel blade.  The skin margins were undermined to an appropriate distance in all directions utilizing iris scissors.
Bilobed Flap Text: The defect edges were debeveled with a #15 scalpel blade.  Given the location of the defect and the proximity to free margins a bilobe flap was deemed most appropriate.  Using a sterile surgical marker, an appropriate bilobe flap drawn around the defect.    The area thus outlined was incised deep to adipose tissue with a #15 scalpel blade.  The skin margins were undermined to an appropriate distance in all directions utilizing iris scissors.
Bilobed Transposition Flap Text: The defect edges were debeveled with a #15 scalpel blade.  Given the location of the defect and the proximity to free margins a bilobed transposition flap was deemed most appropriate.  Using a sterile surgical marker, an appropriate bilobe flap drawn around the defect.    The area thus outlined was incised deep to adipose tissue with a #15 scalpel blade.  The skin margins were undermined to an appropriate distance in all directions utilizing iris scissors.
Trilobed Flap Text: The defect edges were debeveled with a #15 scalpel blade.  Given the location of the defect and the proximity to free margins a trilobed flap was deemed most appropriate.  Using a sterile surgical marker, an appropriate trilobed flap drawn around the defect.    The area thus outlined was incised deep to adipose tissue with a #15 scalpel blade.  The skin margins were undermined to an appropriate distance in all directions utilizing iris scissors.
Dorsal Nasal Flap Text: The defect edges were debeveled with a #15 scalpel blade.  Given the location of the defect and the proximity to free margins a dorsal nasal flap,based upon the glabellar folds, was deemed most appropriate.  Using a sterile surgical marker, an appropriate dorsal nasal flap was drawn around the defect.    The area thus outlined was incised deep to adipose tissue with a #15 scalpel blade.  The skin margins were undermined to an appropriate distance in all directions utilizing iris scissors.
Island Pedicle Flap Text: The defect edges were debeveled with a #15 scalpel blade.  Given the location of the defect, shape of the defect and the proximity to free margins an island pedicle advancement flap was deemed most appropriate.  Using a sterile surgical marker, an appropriate advancement flap was drawn incorporating the defect, outlining the appropriate donor tissue and placing the expected incisions within the relaxed skin tension lines where possible.    The area thus outlined was incised deep to adipose tissue with a #15 scalpel blade.  The skin margins were undermined to an appropriate distance in all directions around the primary defect and laterally outward around the island pedicle utilizing iris scissors.  There was minimal undermining beneath the pedicle flap.
Island Pedicle Flap With Canthal Suspension Text: The defect edges were debeveled with a #15 scalpel blade.  Given the location of the defect, shape of the defect and the proximity to free margins an island pedicle advancement flap was deemed most appropriate.  Using a sterile surgical marker, an appropriate advancement flap was drawn incorporating the defect, outlining the appropriate donor tissue and placing the expected incisions within the relaxed skin tension lines where possible. The area thus outlined was incised deep to adipose tissue with a #15 scalpel blade.  The skin margins were undermined to an appropriate distance in all directions around the primary defect and laterally outward around the island pedicle utilizing iris scissors.  There was minimal undermining beneath the pedicle flap. A suspension suture was placed in the canthal tendon to prevent tension and prevent ectropion.
Alar Island Pedicle Flap Text: The defect edges were debeveled with a #15 scalpel blade.  Given the location of the defect, shape of the defect and the proximity to the alar rim an island pedicle advancement flap was deemed most appropriate.  Using a sterile surgical marker, an appropriate advancement flap was drawn incorporating the defect, outlining the appropriate donor tissue and placing the expected incisions within the nasal ala running parallel to the alar rim. The area thus outlined was incised with a #15 scalpel blade.  The skin margins were undermined minimally to an appropriate distance in all directions around the primary defect and laterally outward around the island pedicle utilizing iris scissors.  There was minimal undermining beneath the pedicle flap.
Double Island Pedicle Flap Text: The defect edges were debeveled with a #15 scalpel blade.  Given the location of the defect, shape of the defect and the proximity to free margins a double island pedicle advancement flap was deemed most appropriate.  Using a sterile surgical marker, an appropriate advancement flap was drawn incorporating the defect, outlining the appropriate donor tissue and placing the expected incisions within the relaxed skin tension lines where possible.    The area thus outlined was incised deep to adipose tissue with a #15 scalpel blade.  The skin margins were undermined to an appropriate distance in all directions around the primary defect and laterally outward around the island pedicle utilizing iris scissors.  There was minimal undermining beneath the pedicle flap.
Island Pedicle Flap-Requiring Vessel Identification Text: The defect edges were debeveled with a #15 scalpel blade.  Given the location of the defect, shape of the defect and the proximity to free margins an island pedicle advancement flap was deemed most appropriate.  Using a sterile surgical marker, an appropriate advancement flap was drawn, based on the axial vessel mentioned above, incorporating the defect, outlining the appropriate donor tissue and placing the expected incisions within the relaxed skin tension lines where possible.    The area thus outlined was incised deep to adipose tissue with a #15 scalpel blade.  The skin margins were undermined to an appropriate distance in all directions around the primary defect and laterally outward around the island pedicle utilizing iris scissors.  There was minimal undermining beneath the pedicle flap.
Keystone Flap Text: The defect edges were debeveled with a #15 scalpel blade.  Given the location of the defect, shape of the defect a keystone flap was deemed most appropriate.  Using a sterile surgical marker, an appropriate keystone flap was drawn incorporating the defect, outlining the appropriate donor tissue and placing the expected incisions within the relaxed skin tension lines where possible. The area thus outlined was incised deep to adipose tissue with a #15 scalpel blade.  The skin margins were undermined to an appropriate distance in all directions around the primary defect and laterally outward around the flap utilizing iris scissors.
O-T Plasty Text: The defect edges were debeveled with a #15 scalpel blade.  Given the location of the defect, shape of the defect and the proximity to free margins an O-T plasty was deemed most appropriate.  Using a sterile surgical marker, an appropriate O-T plasty was drawn incorporating the defect and placing the expected incisions within the relaxed skin tension lines where possible.    The area thus outlined was incised deep to adipose tissue with a #15 scalpel blade.  The skin margins were undermined to an appropriate distance in all directions utilizing iris scissors.
O-Z Plasty Text: The defect edges were debeveled with a #15 scalpel blade.  Given the location of the defect, shape of the defect and the proximity to free margins an O-Z plasty (double transposition flap) was deemed most appropriate.  Using a sterile surgical marker, the appropriate transposition flaps were drawn incorporating the defect and placing the expected incisions within the relaxed skin tension lines where possible.    The area thus outlined was incised deep to adipose tissue with a #15 scalpel blade.  The skin margins were undermined to an appropriate distance in all directions utilizing iris scissors.  Hemostasis was achieved with electrocautery.  The flaps were then transposed into place, one clockwise and the other counterclockwise, and anchored with interrupted buried subcutaneous sutures.
Double O-Z Plasty Text: The defect edges were debeveled with a #15 scalpel blade.  Given the location of the defect, shape of the defect and the proximity to free margins a Double O-Z plasty (double transposition flap) was deemed most appropriate.  Using a sterile surgical marker, the appropriate transposition flaps were drawn incorporating the defect and placing the expected incisions within the relaxed skin tension lines where possible. The area thus outlined was incised deep to adipose tissue with a #15 scalpel blade.  The skin margins were undermined to an appropriate distance in all directions utilizing iris scissors.  Hemostasis was achieved with electrocautery.  The flaps were then transposed into place, one clockwise and the other counterclockwise, and anchored with interrupted buried subcutaneous sutures.
V-Y Plasty Text: The defect edges were debeveled with a #15 scalpel blade.  Given the location of the defect, shape of the defect and the proximity to free margins an V-Y advancement flap was deemed most appropriate.  Using a sterile surgical marker, an appropriate advancement flap was drawn incorporating the defect and placing the expected incisions within the relaxed skin tension lines where possible.    The area thus outlined was incised deep to adipose tissue with a #15 scalpel blade.  The skin margins were undermined to an appropriate distance in all directions utilizing iris scissors.
H Plasty Text: Given the location of the defect, shape of the defect and the proximity to free margins a H-plasty was deemed most appropriate for repair.  Using a sterile surgical marker, the appropriate advancement arms of the H-plasty were drawn incorporating the defect and placing the expected incisions within the relaxed skin tension lines where possible. The area thus outlined was incised deep to adipose tissue with a #15 scalpel blade. The skin margins were undermined to an appropriate distance in all directions utilizing iris scissors.  The opposing advancement arms were then advanced into place in opposite direction and anchored with interrupted buried subcutaneous sutures.
W Plasty Text: The lesion was extirpated to the level of the fat with a #15 scalpel blade.  Given the location of the defect, shape of the defect and the proximity to free margins a W-plasty was deemed most appropriate for repair.  Using a sterile surgical marker, the appropriate transposition arms of the W-plasty were drawn incorporating the defect and placing the expected incisions within the relaxed skin tension lines where possible.    The area thus outlined was incised deep to adipose tissue with a #15 scalpel blade.  The skin margins were undermined to an appropriate distance in all directions utilizing iris scissors.  The opposing transposition arms were then transposed into place in opposite direction and anchored with interrupted buried subcutaneous sutures.
Z Plasty Text: The lesion was extirpated to the level of the fat with a #15 scalpel blade.  Given the location of the defect, shape of the defect and the proximity to free margins a Z-plasty was deemed most appropriate for repair.  Using a sterile surgical marker, the appropriate transposition arms of the Z-plasty were drawn incorporating the defect and placing the expected incisions within the relaxed skin tension lines where possible.    The area thus outlined was incised deep to adipose tissue with a #15 scalpel blade.  The skin margins were undermined to an appropriate distance in all directions utilizing iris scissors.  The opposing transposition arms were then transposed into place in opposite direction and anchored with interrupted buried subcutaneous sutures.
Zygomaticofacial Flap Text: Given the location of the defect, shape of the defect and the proximity to free margins a zygomaticofacial flap was deemed most appropriate for repair.  Using a sterile surgical marker, the appropriate flap was drawn incorporating the defect and placing the expected incisions within the relaxed skin tension lines where possible. The area thus outlined was incised deep to adipose tissue with a #15 scalpel blade with preservation of a vascular pedicle.  The skin margins were undermined to an appropriate distance in all directions utilizing iris scissors.  The flap was then placed into the defect and anchored with interrupted buried subcutaneous sutures.
Cheek Interpolation Flap Text: A decision was made to reconstruct the defect utilizing an interpolation axial flap and a staged reconstruction.  A telfa template was made of the defect.  This telfa template was then used to outline the Cheek Interpolation flap.  The donor area for the pedicle flap was then injected with anesthesia.  The flap was excised through the skin and subcutaneous tissue down to the layer of the underlying musculature.  The interpolation flap was carefully excised within this deep plane to maintain its blood supply.  The edges of the donor site were undermined.   The donor site was closed in a primary fashion.  The pedicle was then rotated into position and sutured.  Once the tube was sutured into place, adequate blood supply was confirmed with blanching and refill.  The pedicle was then wrapped with xeroform gauze and dressed appropriately with a telfa and gauze bandage to ensure continued blood supply and protect the attached pedicle.
Cheek-To-Nose Interpolation Flap Text: A decision was made to reconstruct the defect utilizing an interpolation axial flap and a staged reconstruction.  A telfa template was made of the defect.  This telfa template was then used to outline the Cheek-To-Nose Interpolation flap.  The donor area for the pedicle flap was then injected with anesthesia.  The flap was excised through the skin and subcutaneous tissue down to the layer of the underlying musculature.  The interpolation flap was carefully excised within this deep plane to maintain its blood supply.  The edges of the donor site were undermined.   The donor site was closed in a primary fashion.  The pedicle was then rotated into position and sutured.  Once the tube was sutured into place, adequate blood supply was confirmed with blanching and refill.  The pedicle was then wrapped with xeroform gauze and dressed appropriately with a telfa and gauze bandage to ensure continued blood supply and protect the attached pedicle.
Interpolation Flap Text: A decision was made to reconstruct the defect utilizing an interpolation axial flap and a staged reconstruction.  A telfa template was made of the defect.  This telfa template was then used to outline the interpolation flap.  The donor area for the pedicle flap was then injected with anesthesia.  The flap was excised through the skin and subcutaneous tissue down to the layer of the underlying musculature.  The interpolation flap was carefully excised within this deep plane to maintain its blood supply.  The edges of the donor site were undermined.   The donor site was closed in a primary fashion.  The pedicle was then rotated into position and sutured.  Once the tube was sutured into place, adequate blood supply was confirmed with blanching and refill.  The pedicle was then wrapped with xeroform gauze and dressed appropriately with a telfa and gauze bandage to ensure continued blood supply and protect the attached pedicle.
Melolabial Interpolation Flap Text: A decision was made to reconstruct the defect utilizing an interpolation axial flap and a staged reconstruction.  A telfa template was made of the defect.  This telfa template was then used to outline the melolabial interpolation flap.  The donor area for the pedicle flap was then injected with anesthesia.  The flap was excised through the skin and subcutaneous tissue down to the layer of the underlying musculature.  The pedicle flap was carefully excised within this deep plane to maintain its blood supply.  The edges of the donor site were undermined.   The donor site was closed in a primary fashion.  The pedicle was then rotated into position and sutured.  Once the tube was sutured into place, adequate blood supply was confirmed with blanching and refill.  The pedicle was then wrapped with xeroform gauze and dressed appropriately with a telfa and gauze bandage to ensure continued blood supply and protect the attached pedicle.
Mastoid Interpolation Flap Text: A decision was made to reconstruct the defect utilizing an interpolation axial flap and a staged reconstruction.  A telfa template was made of the defect.  This telfa template was then used to outline the mastoid interpolation flap.  The donor area for the pedicle flap was then injected with anesthesia.  The flap was excised through the skin and subcutaneous tissue down to the layer of the underlying musculature.  The pedicle flap was carefully excised within this deep plane to maintain its blood supply.  The edges of the donor site were undermined.   The donor site was closed in a primary fashion.  The pedicle was then rotated into position and sutured.  Once the tube was sutured into place, adequate blood supply was confirmed with blanching and refill.  The pedicle was then wrapped with xeroform gauze and dressed appropriately with a telfa and gauze bandage to ensure continued blood supply and protect the attached pedicle.
Posterior Auricular Interpolation Flap Text: A decision was made to reconstruct the defect utilizing an interpolation axial flap and a staged reconstruction.  A telfa template was made of the defect.  This telfa template was then used to outline the posterior auricular interpolation flap.  The donor area for the pedicle flap was then injected with anesthesia.  The flap was excised through the skin and subcutaneous tissue down to the layer of the underlying musculature.  The pedicle flap was carefully excised within this deep plane to maintain its blood supply.  The edges of the donor site were undermined.   The donor site was closed in a primary fashion.  The pedicle was then rotated into position and sutured.  Once the tube was sutured into place, adequate blood supply was confirmed with blanching and refill.  The pedicle was then wrapped with xeroform gauze and dressed appropriately with a telfa and gauze bandage to ensure continued blood supply and protect the attached pedicle.
Paramedian Forehead Flap Text: A decision was made to reconstruct the defect utilizing an interpolation axial flap and a staged reconstruction.  A telfa template was made of the defect.  This telfa template was then used to outline the paramedian forehead pedicle flap.  The donor area for the pedicle flap was then injected with anesthesia.  The flap was excised through the skin and subcutaneous tissue down to the layer of the underlying musculature.  The pedicle flap was carefully excised within this deep plane to maintain its blood supply.  The edges of the donor site were undermined.   The donor site was closed in a primary fashion.  The pedicle was then rotated into position and sutured.  Once the tube was sutured into place, adequate blood supply was confirmed with blanching and refill.  The pedicle was then wrapped with xeroform gauze and dressed appropriately with a telfa and gauze bandage to ensure continued blood supply and protect the attached pedicle.
Cheiloplasty (Less Than 50%) Text: A decision was made to reconstruct the defect with a  cheiloplasty.  The defect was undermined extensively.  Additional obicularis oris muscle was excised with a 15 blade scalpel.  The defect was converted into a full thickness wedge, of less than 50% of the vertical height of the lip, to facilite a better cosmetic result.  Small vessels were then tied off with 5-0 monocyrl. The obicularis oris, superficial fascia, adipose and dermis were then reapproximated.  After the deeper layers were approximated the epidermis was reapproximated with particular care given to realign the vermilion border.
Cheiloplasty (Complex) Text: A decision was made to reconstruct the defect with a  cheiloplasty.  The defect was undermined extensively.  Additional obicularis oris muscle was excised with a 15 blade scalpel.  The defect was converted into a full thickness wedge to facilite a better cosmetic result.  Small vessels were then tied off with 5-0 monocyrl. The obicularis oris, superficial fascia, adipose and dermis were then reapproximated.  After the deeper layers were approximated the epidermis was reapproximated with particular care given to realign the vermilion border.
Ear Wedge Repair Text: A wedge excision was completed by carrying down an excision through the full thickness of the ear and cartilage with an inward facing Burow's triangle. The wound was then closed in a layered fashion.
Full Thickness Lip Wedge Repair (Flap) Text: Given the location of the defect and the proximity to free margins a full thickness wedge repair was deemed most appropriate.  Using a sterile surgical marker, the appropriate repair was drawn incorporating the defect and placing the expected incisions perpendicular to the vermilion border.  The vermilion border was also meticulously outlined to ensure appropriate reapproximation during the repair.  The area thus outlined was incised through and through with a #15 scalpel blade.  The muscularis and dermis were reaproximated with deep sutures following hemostasis. Care was taken to realign the vermilion border before proceeding with the superficial closure.  Once the vermilion was realigned the superfical and mucosal closure was finished.
Ftsg Text: The defect edges were debeveled with a #15 scalpel blade.  Given the location of the defect, shape of the defect and the proximity to free margins a full thickness skin graft was deemed most appropriate.  Using a sterile surgical marker, the primary defect shape was transferred to the donor site. The area thus outlined was incised deep to adipose tissue with a #15 scalpel blade.  The harvested graft was then trimmed of adipose tissue until only dermis and epidermis was left.  The skin margins of the secondary defect were undermined to an appropriate distance in all directions utilizing iris scissors.  The secondary defect was closed with interrupted buried subcutaneous sutures.  The skin edges were then re-apposed with running  sutures.  The skin graft was then placed in the primary defect and oriented appropriately.
Split-Thickness Skin Graft Text: The defect edges were debeveled with a #15 scalpel blade.  Given the location of the defect, shape of the defect and the proximity to free margins a split thickness skin graft was deemed most appropriate.  Using a sterile surgical marker, the primary defect shape was transferred to the donor site. The split thickness graft was then harvested.  The skin graft was then placed in the primary defect and oriented appropriately.
Burow's Graft Text: The defect edges were debeveled with a #15 scalpel blade.  Given the location of the defect, shape of the defect, the proximity to free margins and the presence of a standing cone deformity a Burow's skin graft was deemed most appropriate. The standing cone was removed and this tissue was then trimmed to the shape of the primary defect. The adipose tissue was also removed until only dermis and epidermis were left.  The skin margins of the secondary defect were undermined to an appropriate distance in all directions utilizing iris scissors.  The secondary defect was closed with interrupted buried subcutaneous sutures.  The skin edges were then re-apposed with running  sutures.  The skin graft was then placed in the primary defect and oriented appropriately.
Cartilage Graft Text: The defect edges were debeveled with a #15 scalpel blade.  Given the location of the defect, shape of the defect, the fact the defect involved a full thickness cartilage defect a cartilage graft was deemed most appropriate.  An appropriate donor site was identified, cleansed, and anesthetized. The cartilage graft was then harvested and transferred to the recipient site, oriented appropriately and then sutured into place.  The secondary defect was then repaired using a primary closure.
Composite Graft Text: The defect edges were debeveled with a #15 scalpel blade.  Given the location of the defect, shape of the defect, the proximity to free margins and the fact the defect was full thickness a composite graft was deemed most appropriate.  The defect was outline and then transferred to the donor site.  A full thickness graft was then excised from the donor site. The graft was then placed in the primary defect, oriented appropriately and then sutured into place.  The secondary defect was then repaired using a primary closure.
Epidermal Autograft Text: The defect edges were debeveled with a #15 scalpel blade.  Given the location of the defect, shape of the defect and the proximity to free margins an epidermal autograft was deemed most appropriate.  Using a sterile surgical marker, the primary defect shape was transferred to the donor site. The epidermal graft was then harvested.  The skin graft was then placed in the primary defect and oriented appropriately.
Dermal Autograft Text: The defect edges were debeveled with a #15 scalpel blade.  Given the location of the defect, shape of the defect and the proximity to free margins a dermal autograft was deemed most appropriate.  Using a sterile surgical marker, the primary defect shape was transferred to the donor site. The area thus outlined was incised deep to adipose tissue with a #15 scalpel blade.  The harvested graft was then trimmed of adipose and epidermal tissue until only dermis was left.  The skin graft was then placed in the primary defect and oriented appropriately.
Skin Substitute Text: The defect edges were debeveled with a #15 scalpel blade.  Given the location of the defect, shape of the defect and the proximity to free margins a skin substitute graft was deemed most appropriate.  The graft material was trimmed to fit the size of the defect. The graft was then placed in the primary defect and oriented appropriately.
Tissue Cultured Epidermal Autograft Text: The defect edges were debeveled with a #15 scalpel blade.  Given the location of the defect, shape of the defect and the proximity to free margins a tissue cultured epidermal autograft was deemed most appropriate.  The graft was then trimmed to fit the size of the defect.  The graft was then placed in the primary defect and oriented appropriately.
Xenograft Text: The defect edges were debeveled with a #15 scalpel blade.  Given the location of the defect, shape of the defect and the proximity to free margins a xenograft was deemed most appropriate.  The graft was then trimmed to fit the size of the defect.  The graft was then placed in the primary defect and oriented appropriately.
Purse String (Simple) Text: Given the location of the defect and the characteristics of the surrounding skin a purse string closure was deemed most appropriate.  Undermining was performed circumfirentially around the surgical defect.  A purse string suture was then placed and tightened.
Purse String (Intermediate) Text: Given the location of the defect and the characteristics of the surrounding skin a purse string intermediate closure was deemed most appropriate.  Undermining was performed circumfirentially around the surgical defect.  A purse string suture was then placed and tightened.
Partial Purse String (Simple) Text: Given the location of the defect and the characteristics of the surrounding skin a simple purse string closure was deemed most appropriate.  Undermining was performed circumfirentially around the surgical defect.  A purse string suture was then placed and tightened. Wound tension only allowed a partial closure of the circular defect.
Partial Purse String (Intermediate) Text: Given the location of the defect and the characteristics of the surrounding skin an intermediate purse string closure was deemed most appropriate.  Undermining was performed circumfirentially around the surgical defect.  A purse string suture was then placed and tightened. Wound tension only allowed a partial closure of the circular defect.
Localized Dermabrasion With Wire Brush Text: The patient was draped in routine manner.  Localized dermabrasion using 3 x 17 mm wire brush was performed in routine manner to papillary dermis. This spot dermabrasion is being performed to complete skin cancer reconstruction. It also will eliminate the other sun damaged precancerous cells that are known to be part of the regional effect of a lifetime's worth of sun exposure. This localized dermabrasion is therapeutic and should not be considered cosmetic in any regard.
Tarsorrhaphy Text: A tarsorrhaphy was performed using Frost sutures.
Complex Repair And Flap Additional Text (Will Appearing After The Standard Complex Repair Text): The complex repair was not sufficient to completely close the primary defect. The remaining additional defect was repaired with the flap mentioned below.
Complex Repair And Graft Additional Text (Will Appearing After The Standard Complex Repair Text): The complex repair was not sufficient to completely close the primary defect. The remaining additional defect was repaired with the graft mentioned below.
Unique Flap 1 Name: Myocutaneous Island pedicle Flap
Unique Flap 2 Name: Peng Flap
Unique Flap 3 Name: Mercedes Flap
Unique Flap 4 Name: Banner Flap
Unique Flap 5 Name: tunneled myocutaneous flap
Unique Flap 6 Name: Claudia-B?ch flap
Unique Flap 7 Name: Mustarde flap
Unique Flap 8 Name: East to West Flap
Unique Flap 1 Text: A decision was made to reconstruct the defect utilizing a myocutaneous Island pedicle Flap based on the levator labii superioris muscle.  A telfa template was made of the defect.  This telfa template was then used to outline the myocutaneous flap, based along the meilolabial fold.  The donor area for the pedicle flap was then injected with anesthesia.  The flap was excised through the skin and subcutaneous tissue down to the layer of the underlying musculature.  The myocutaneous flap was carefully excised within this deep plane to maintain its blood supply. Based on the muscle. The edges of the donor site were undermined.   The donor site was closed in a primary fashion to the point of transposition.  The pedicle was then transposed into position and sutured.  Once the flap was sutured into place, adequate blood supply was confirmed with blanching and refill.
Unique Flap 2 Text: A decision was made to reconstruct the defect utilizing a Peng Flap (Bilateral Advancement Rotation Flap). Given the location of the defect and the proximity to free margins, this flap was deemed most appropriate.  Using a sterile surgical marker, the appropriate rotation flaps were drawn incorporating the defect and placing the expected incisions within the relaxed skin tension lines where possible.    The area thus outlined was incised deep to adipose tissue with a #15 scalpel blade.  The skin margins were undermined to an appropriate distance in all directions utilizing iris scissors.
Unique Flap 3 Text: The defect edges were debeveled with a #15 scalpel blade.  Given the location of the defect, shape of the defect and the proximity to free margins a Mercedes (double advancement flap) was deemed most appropriate.  Using a sterile surgical marker, the appropriate transposition flaps were drawn incorporating the defect and placing the expected incisions within the relaxed skin tension lines where possible.    The area thus outlined was incised deep to adipose tissue with a #15 scalpel blade.  The skin margins were undermined to an appropriate distance in all directions utilizing iris scissors.  Hemostasis was achieved with electrocautery.  The flaps were then advanced into the defect and anchored with interrupted buried subcutaneous sutures.
Unique Flap 4 Text: The defect edges were debeveled with a #15 scalpel blade.  Given the location of the defect and the proximity to free margins a Banner transposition flap was deemed most appropriate.  Using a sterile surgical marker, an appropriate Banner transposition flap was drawn incorporating the defect.    The area thus outlined was incised deep to adipose tissue with a #15 scalpel blade.  The skin margins were undermined to an appropriate distance in all directions utilizing iris scissors.
Unique Flap 5 Text: A decision was made to reconstruct the defect utilizing a tunneled myocutaneous Island pedicle Flap based on the anterior auricularis muscle.  A telfa template was made of the defect.  This telfa template was then used to outline the myocutaneous flap, based along the preauricular fold.  The donor area for the pedicle flap was then injected with anesthesia.  The flap was excised through the skin and subcutaneous tissue down to the layer of the underlying musculature.  The myocutaneous flap was carefully excised within this deep plane to maintain its blood supply based on the muscle. The edges of the donor site were undermined.   The donor site was closed in a primary fashion to the point of transposition.  The pedicle was then transposed through a tunnel into position and sutured.  Once the flap was sutured into place, adequate blood supply was confirmed with blanching and refill.
Unique Flap 6 Text: A decision was made to reconstruct the defect utilizing an Anti-aging-B?ch Flap (Bilateral helical Advancement Rotation Flap). Given the location of the defect and the proximity to free margins, this flap was deemed most appropriate.  Using a sterile surgical marker, the appropriate flaps were drawn incorporating the defect and placing the expected incisions within the relaxed skin tension lines where possible.  The area thus outlined was incised deep to adipose tissue with a #15 scalpel blade.  The skin margins were undermined to an appropriate distance in all directions utilizing iris scissors. Cartilage was incorporated into the flap arms to maintain helical anatomy.
Unique Flap 7 Text: A decision was made to reconstruct the defect utilizing a Mustarde Flap (Advancement Rotation Flap). Given the location of the defect and the proximity to free margins, this flap was deemed most appropriate.  Using a sterile surgical marker, the appropriate rotation flap was drawn incorporating the defect and placing the expected incisions within the relaxed skin tension lines where possible.    The area thus outlined was incised deep to adipose tissue with a #15 scalpel blade.  The skin margins were undermined to an appropriate distance in all directions utilizing iris scissors. The flap was advanced and rotated under the eyelid with a sling created laterally to keep ectropion minimal.
Unique Flap 8 Text: A decision was made to reconstruct the defect utilizing an East to West Flap (Modified Burows Advancement Flap). Given the location of the defect and the proximity to free margins, this flap was deemed most appropriate.  Using a sterile surgical marker, the appropriate advancement flaps were drawn incorporating the defect and placing the expected incisions within the relaxed skin tension lines where possible.    The area thus outlined was incised deep to adipose tissue with a #15 scalpel blade.  The skin margins were undermined to an appropriate distance in all directions utilizing iris scissors. Minimal alar distortion was created with flap approximation.
Manual Repair Warning Statement: We plan on removing the manually selected variable below in favor of our much easier automatic structured text blocks found in the previous tab. We decided to do this to help make the flow better and give you the full power of structured data. Manual selection is never going to be ideal in our platform and I would encourage you to avoid using manual selection from this point on, especially since I will be sunsetting this feature. It is important that you do one of two things with the customized text below. First, you can save all of the text in a word file so you can have it for future reference. Second, transfer the text to the appropriate area in the Library tab. Lastly, if there is a flap or graft type which we do not have you need to let us know right away so I can add it in before the variable is hidden. No need to panic, we plan to give you roughly 6 months to make the change.
Same Histology In Subsequent Stages Text: The pattern and morphology of the tumor is as described in the first stage.
No Residual Tumor Seen Histology Text: There were no malignant cells seen in the sections examined.
Inflammation Suggestive Of Cancer Camouflage Histology Text: There was a dense lymphocytic infiltrate which prevented adequate histologic evaluation of adjacent structures.
Bcc Histology Text: There were numerous aggregates of basaloid cells.
Bcc Infiltrative Histology Text: There were numerous aggregates of basaloid cells demonstrating an infiltrative pattern.
Mart-1 - Positive Histology Text: MART-1 staining demonstrates areas of higher density and clustering of melanocytes with Pagetoid spread upwards within the epidermis. The surgical margins are positive for tumor cells.
Mart-1 - Negative Histology Text: MART-1 staining demonstrates a normal density and pattern of melanocytes along the dermal-epidermal junction. The surgical margins are negative for tumor cells.
Information: Selecting Yes will display possible errors in your note based on the variables you have selected. This validation is only offered as a suggestion for you. PLEASE NOTE THAT THE VALIDATION TEXT WILL BE REMOVED WHEN YOU FINALIZE YOUR NOTE. IF YOU WANT TO FAX A PRELIMINARY NOTE YOU WILL NEED TO TOGGLE THIS TO 'NO' IF YOU DO NOT WANT IT IN YOUR FAXED NOTE.

## 2021-12-13 NOTE — PROCEDURE: MIPS QUALITY
Detail Level: Detailed
Quality 138: Melanoma: Coordination Of Care: A treatment plan was communicated to the physicians providing continuing care within one month of diagnosis outlining: diagnosis, tumor thickness and a plan for surgery or alternate care.
Quality 137: Melanoma: Continuity Of Care - Recall System: Patient information entered into a recall system that includes: target date for the next exam specified AND a process to follow up with patients regarding missed or unscheduled appointments
Quality 111:Pneumonia Vaccination Status For Older Adults: Pneumococcal Vaccination Previously Received
Quality 226: Preventive Care And Screening: Tobacco Use: Screening And Cessation Intervention: Patient screened for tobacco use and is an ex/non-smoker
Quality 130: Documentation Of Current Medications In The Medical Record: Current Medications Documented

## 2021-12-20 ENCOUNTER — APPOINTMENT (RX ONLY)
Dept: URBAN - METROPOLITAN AREA CLINIC 36 | Facility: CLINIC | Age: 86
Setting detail: DERMATOLOGY
End: 2021-12-20

## 2021-12-20 DIAGNOSIS — Z48.02 ENCOUNTER FOR REMOVAL OF SUTURES: ICD-10-CM

## 2021-12-20 DIAGNOSIS — Z85.820 PERSONAL HISTORY OF MALIGNANT MELANOMA OF SKIN: ICD-10-CM

## 2021-12-20 PROCEDURE — 99024 POSTOP FOLLOW-UP VISIT: CPT

## 2021-12-20 PROCEDURE — ? COUNSELING

## 2021-12-20 PROCEDURE — ? SUTURE REMOVAL (GLOBAL PERIOD)

## 2021-12-20 ASSESSMENT — LOCATION ZONE DERM: LOCATION ZONE: FACE

## 2021-12-20 ASSESSMENT — LOCATION SIMPLE DESCRIPTION DERM: LOCATION SIMPLE: INFERIOR FOREHEAD

## 2021-12-20 ASSESSMENT — LOCATION DETAILED DESCRIPTION DERM: LOCATION DETAILED: INFERIOR MID FOREHEAD

## 2021-12-20 NOTE — PROCEDURE: SUTURE REMOVAL (GLOBAL PERIOD)
Detail Level: Detailed
Add 73363 Cpt? (Important Note: In 2017 The Use Of 76221 Is Being Tracked By Cms To Determine Future Global Period Reimbursement For Global Periods): yes

## 2021-12-20 NOTE — PROCEDURE: MIPS QUALITY

## 2021-12-22 ENCOUNTER — OUTPATIENT INFUSION SERVICES (OUTPATIENT)
Dept: ONCOLOGY | Facility: MEDICAL CENTER | Age: 86
End: 2021-12-22
Attending: INTERNAL MEDICINE
Payer: MEDICARE

## 2021-12-22 VITALS
OXYGEN SATURATION: 98 % | HEIGHT: 63 IN | SYSTOLIC BLOOD PRESSURE: 153 MMHG | WEIGHT: 164.46 LBS | BODY MASS INDEX: 29.14 KG/M2 | RESPIRATION RATE: 18 BRPM | HEART RATE: 66 BPM | DIASTOLIC BLOOD PRESSURE: 63 MMHG | TEMPERATURE: 97.3 F

## 2021-12-22 DIAGNOSIS — M81.0 OSTEOPOROSIS, UNSPECIFIED OSTEOPOROSIS TYPE, UNSPECIFIED PATHOLOGICAL FRACTURE PRESENCE: ICD-10-CM

## 2021-12-22 LAB
CA-I BLD ISE-SCNC: 1.16 MMOL/L (ref 1.1–1.3)
CREAT BLD-MCNC: 0.7 MG/DL (ref 0.5–1.4)

## 2021-12-22 PROCEDURE — 82330 ASSAY OF CALCIUM: CPT

## 2021-12-22 PROCEDURE — 700111 HCHG RX REV CODE 636 W/ 250 OVERRIDE (IP): Mod: JG | Performed by: INTERNAL MEDICINE

## 2021-12-22 PROCEDURE — 36415 COLL VENOUS BLD VENIPUNCTURE: CPT

## 2021-12-22 PROCEDURE — 96372 THER/PROPH/DIAG INJ SC/IM: CPT

## 2021-12-22 PROCEDURE — 82565 ASSAY OF CREATININE: CPT

## 2021-12-22 RX ORDER — SODIUM CHLORIDE 9 MG/ML
INJECTION, SOLUTION INTRAVENOUS CONTINUOUS
Status: CANCELLED | OUTPATIENT
Start: 2021-12-22

## 2021-12-22 RX ORDER — METHYLPREDNISOLONE SODIUM SUCCINATE 125 MG/2ML
125 INJECTION, POWDER, LYOPHILIZED, FOR SOLUTION INTRAMUSCULAR; INTRAVENOUS PRN
Status: CANCELLED | OUTPATIENT
Start: 2021-12-22

## 2021-12-22 RX ORDER — 0.9 % SODIUM CHLORIDE 0.9 %
10 VIAL (ML) INJECTION PRN
Status: CANCELLED | OUTPATIENT
Start: 2021-12-22

## 2021-12-22 RX ORDER — DIPHENHYDRAMINE HYDROCHLORIDE 50 MG/ML
50 INJECTION INTRAMUSCULAR; INTRAVENOUS PRN
Status: CANCELLED | OUTPATIENT
Start: 2021-12-22

## 2021-12-22 RX ORDER — 0.9 % SODIUM CHLORIDE 0.9 %
3 VIAL (ML) INJECTION PRN
Status: CANCELLED | OUTPATIENT
Start: 2021-12-22

## 2021-12-22 RX ORDER — EPINEPHRINE 1 MG/ML(1)
0.5 AMPUL (ML) INJECTION PRN
Status: CANCELLED | OUTPATIENT
Start: 2021-12-22

## 2021-12-22 RX ORDER — HEPARIN SODIUM (PORCINE) LOCK FLUSH IV SOLN 100 UNIT/ML 100 UNIT/ML
500 SOLUTION INTRAVENOUS PRN
Status: CANCELLED | OUTPATIENT
Start: 2021-12-22

## 2021-12-22 RX ORDER — 0.9 % SODIUM CHLORIDE 0.9 %
VIAL (ML) INJECTION PRN
Status: CANCELLED | OUTPATIENT
Start: 2021-12-22

## 2021-12-22 RX ADMIN — DENOSUMAB 60 MG: 60 INJECTION SUBCUTANEOUS at 12:24

## 2021-12-22 ASSESSMENT — FIBROSIS 4 INDEX: FIB4 SCORE: 2.27

## 2021-12-22 NOTE — PROGRESS NOTES
Pt presents to [facility] for prolia injection. Pt denies any oral sx in the last 6 weeks and is taking calcium and vitamin D; education provided and verbalized understanding. Labs drawn from R[facility]; brisk blood return observed and pt tolerated well. Labs within treatable parameters. Prolia injected into R back arm with no s/s of adverse reactions. Next appointment confirmed and education provided. Pt discharged to self care with all personal belongings and in NAD.

## 2021-12-27 ENCOUNTER — OFFICE VISIT (OUTPATIENT)
Dept: RHEUMATOLOGY | Facility: MEDICAL CENTER | Age: 86
End: 2021-12-27
Payer: MEDICARE

## 2021-12-27 VITALS
DIASTOLIC BLOOD PRESSURE: 70 MMHG | TEMPERATURE: 97.1 F | WEIGHT: 168 LBS | OXYGEN SATURATION: 94 % | BODY MASS INDEX: 29.77 KG/M2 | HEART RATE: 82 BPM | RESPIRATION RATE: 14 BRPM | SYSTOLIC BLOOD PRESSURE: 122 MMHG

## 2021-12-27 DIAGNOSIS — D68.59 PROTEIN S DEFICIENCY (HCC): ICD-10-CM

## 2021-12-27 DIAGNOSIS — I10 ESSENTIAL HYPERTENSION, BENIGN: ICD-10-CM

## 2021-12-27 DIAGNOSIS — Z79.899 LONG-TERM USE OF PLAQUENIL: ICD-10-CM

## 2021-12-27 DIAGNOSIS — G62.9 NEUROPATHY: ICD-10-CM

## 2021-12-27 DIAGNOSIS — M15.9 PRIMARY OSTEOARTHRITIS INVOLVING MULTIPLE JOINTS: ICD-10-CM

## 2021-12-27 DIAGNOSIS — H35.30 MACULAR DEGENERATION OF BOTH EYES, UNSPECIFIED TYPE: ICD-10-CM

## 2021-12-27 DIAGNOSIS — M81.0 OSTEOPOROSIS WITHOUT CURRENT PATHOLOGICAL FRACTURE, UNSPECIFIED OSTEOPOROSIS TYPE: ICD-10-CM

## 2021-12-27 DIAGNOSIS — Z79.01 CHRONIC ANTICOAGULATION: ICD-10-CM

## 2021-12-27 PROCEDURE — 99214 OFFICE O/P EST MOD 30 MIN: CPT | Performed by: INTERNAL MEDICINE

## 2021-12-27 ASSESSMENT — FIBROSIS 4 INDEX: FIB4 SCORE: 2.27

## 2021-12-27 NOTE — PROGRESS NOTES
Chief Complaint- joint pain     Subjective:   Marry Mathur is a 86 y.o. female here today for follow up of rheumatological issues      This is a follow-up visit for this patient who is seen in this clinic for osteoarthritis.  Patient does have a remote history of rheumatoid arthritis in the past with negative serologies and negative x-rays.  Patient is on chronic anticoagulation for protein S deficiency and history of DVT so unable to take any NSAIDs.  We will treat patient with Plaquenil 200 mg p.o. twice daily.  Patient feels that this is helping.  Patient denies any side effects from the medication, denies any unexplained weight loss, denies any fevers of unknown etiology, denies any GI upset, denies any rashes, denies any new joint swelling, denies recurrent infections.  Of note last ophthalmology evaluation was October 2021 with Dr. Savage.     Patient's issue today is exacerbation of neuropathy bilateral lower extremities with left foot slightly worse than the right, this is an old problem secondary to a diagnosis of spinal stenosis.  Patient is intermittently followed by spine clinic Dr. Cooper at [facility]     Patient also has low back issues scheduled for facet joint injections through Dr. Cooper at [facility]     Other issues include the development of leg length discrepancy status post left CARLYLE with left leg longer than the right.  Patient just recently got shoes to help adjust her leg length.      Other issues include a finding of osteoporosis on patient's bone density scan from March 2018, patient was not able to tolerate Fosamax because of a history of Araya's esophagus, patient currently on Prolia 60 mg subcu every 6 months with benefit.        Additional comorbidities include diabetes for which patient  takes metformin, also with a diagnosis of spinal stenosis with intermittent weakness in her legs with progressive numbness and weakness in the left  leg.  Patient also with protein S deficiency with a history of DVT on chronic anticoagulation.     Patient also with moderate aortic stenosis followed by cardiology     S/p NSAIDS-unable to take because of chronic anticoagulation     Left TKA   Left CARLYLE      S/p gabapentin -ineffective   S/p Remicade-stopped because of hx of melanoma about 1996 and has multiple other skin cancers  S/p Orencia-lost efficacy  S/p Humira-stopped because of recurrence of melanoma October 2017  S/p MTX-stopped because of development of skin cancer/melanoma October 2017  S/p NSAIDS-relatively contraindicated as patient is on chronic anticoagulation   S/p xeljanz-stopped 2018 because of the development of colonic adenocarcinoma  S/p fosamax-unable to tolerate-GI upset      G6PD 13.6 adequate 5/2019  DEXA 3/18/2016 T scores 1.1, -1.1   FRAX 3/18/2016 major osteoporotic fracture risk 14.3%, hip fracture risks 3.7%  DEXA 3/23/2018 T scores 0.2, -1.4  FRAX 3/23/2018 major osteoporotic fracture risk 19.3%, hip fracture risk 6.1%  DEXA 6/2/2020 T scores 0.7, -1.4  FRAX 6/2/2020 major osteoporotic fracture risk 17.2%, hip fracture risk 4.9%  Prolia started 6/2019  Echocardiogram 7/2012 [facility]  RF neg 3/2014 [facility]; RF neg 6/2014 [facility]; RF neg 6/2016  CCP neg 3/2014 [facility]; CCP neg 6/2014 [facility]; CCP neg 6/2016  Uric acid 4.7 3/2014 [facility];Uric acid 4.5 6/2016  Hep B neg 6/2016  Quantiferon Gold neg 6/2016    Hand x-rays 3/2016-indicate osteoarthritis    Feet x-rays 3/2016-indicate osteoarthritis       Echocardiogram 4/2021-CONCLUSIONS  Compared to the images of the study done 04- there has been no   significant change.  Left ventricular ejection fraction is visually estimated to be 65%.  Moderate aortic stenosis.  Unable to estimate pulmonary artery pressure due to an inadequate   tricuspid regurgitant jet.      Corticosteroid Therapy Informed Consent signed 1/17/2019-copy given to patient           Current Outpatient Medications   Medication Sig Dispense Refill   • amoxicillin (AMOXIL) 500 MG Cap TAKE 4 CAPSULES ONE HOUR PRIOR TO DENTAL APPOINTMENT     • SF 5000 PLUS 1.1 % Cream      • lidocaine (LIDODERM) 5 % Patch APPLY ONE PATCH TO CLEAN DRY SKIN AS DIRECTED DAILY 90 Patch 1   • Calcium Carb-Cholecalciferol (CALCIUM 500 + D) 500-400 MG-UNIT Tab Take 1 Tablet by mouth at bedtime.     • gabapentin (NEURONTIN) 300 MG Cap 1 tab p.o. nightly (Patient taking differently: Take 300 mg by mouth at bedtime. 1 tab p.o. nightly) 90 Capsule 0   • rivaroxaban (XARELTO) 20 MG Tab tablet Take 1 Tablet by mouth with dinner. 90 Tablet 1   • hydroxychloroquine (PLAQUENIL) 200 MG Tab Take 1 tablet by mouth 2 times a day. 180 tablet 1   • omeprazole (PRILOSEC) 20 MG delayed-release capsule Take 1 capsule by mouth every day. 90 capsule 1   • pregabalin (LYRICA) 150 MG Cap Take 150 mg by mouth every day.     • atorvastatin (LIPITOR) 10 MG Tab Take 1 tablet by mouth every evening. 90 tablet 3   • Potassium 99 MG Tab Take 99 mg by mouth every day.     • Diphenhydramine-APAP, sleep, (TYLENOL PM EXTRA STRENGTH)  MG Tab Take 2 Tabs by mouth every bedtime.     • acetaminophen (TYLENOL) 500 MG Tab Take 1,000 mg by mouth 2 Times a Day.     • Melatonin 10 MG Tab Take 20 mg by mouth at bedtime.     • vitamin D (CHOLECALCIFEROL) 1000 UNIT Tab Take 1,000 Units by mouth every morning.       No current facility-administered medications for this visit.     She  has a past medical history of Adenocarcinoma of colon ([facility]) (10/30/2018), Anesthesia, Atrial fibrillation [I48.91] (4/19/2016), Back pain (6/27/2012), Blood clotting disorder ([facility]) (2012), Bowel habit changes, Cancer ([facility]), Cataract, Chronic back pain greater than 3 months duration, Deep vein thrombosis ([facility]) (3/8/2016), Essential hypertension, benign (6/27/2012), GERD (gastroesophageal reflux disease) (6/27/2012), Heart murmur, Hyperlipidemia, Hypertension, Impaired fasting  glucose (11/2/2017), Mild aortic stenosis (11/2/2017), Other and unspecified hyperlipidemia (6/27/2012), Prediabetes, Protein S deficiency (HCC) (11/2/2017), Rheumatoid arthritis involving multiple sites with positive rheumatoid factor (HCC) (03/14/2016), Rheumatoid nodule (HCC) (7/25/2017), Right bundle branch block (6/27/2012), and Urinary incontinence (11/2/2017).    ROS   Other than what is mentioned in HPI or physical exam, there is no history of headaches, double vision or blurred vision. No temporal tenderness or jaw claudication. No trouble swallowing difficulties or sore throats.  No chest complaints including chest pain, cough or sputum production. No GI complaints including nausea, vomiting, change in bowel habits, or past peptic ulcer disease. No history of blood in the stools. No urinary complaints including dysuria or frequency. No history of alopecia, photosensitivity, oral ulcerations, Raynaud's phenomena.       Objective:     /70   Pulse 82   Temp 36.2 °C (97.1 °F) (Temporal)   Resp 14   Wt 76.2 kg (168 lb)   SpO2 94%  Body mass index is 29.77 kg/m².   Physical Exam:    Constitutional: Alert and oriented X3, patient is talkative with good eye contact.Skin: Warm, dry, good turgor, no rashes in visible areas.Eye: Equal, round and reactive, conjunctiva clear, lids normal EOM intactENMT: Lips without lesions, good dentition, no oropharyngeal ulcers, moist buccal mucosa, pinna without deformityNeck: Trachea midline, no masses, no thyromegaly.Lymph:  No cervical lymphadenopathy, no axillary lymphadenopathy, no supraclavicular lymphadenopathyRespiratory: Unlabored respiratory effort, lungs clear to auscultation, no wheezes, no ronchi.Cardiovascular: Normal S1, S2, positive high-pitched systolic murmur, no edema.Abdomen: Soft, non-tender, no masses, no hepatosplenomegaly.Psych: Alert and oriented x3, normal affect and mood.Neuro: Cranial nerves 2-12 are grossly intact, no loss of sensation  LEExt:no joint laxity noted in bilateral arms, no joint laxity noted in bilateral legs Heberden's nodes on DIP joints but otherwise no evidence of swan-neck or boutonniere deformities no dactylitis no sausage digits, toes without crossover toes without splay toes, poor muscular architecture of the shoulders but full range of motion, no flexion contractures of the elbows no nodules no tophi    Lab Results   Component Value Date/Time    QNTTBGOLD Negative 06/29/2016 02:03 PM     Lab Results   Component Value Date/Time    HEPBCORIGM Negative 06/29/2016 02:03 PM    HEPBSAG Negative 06/29/2016 02:03 PM     Lab Results   Component Value Date/Time    SODIUM 138 12/05/2021 04:33 PM    POTASSIUM 4.2 12/05/2021 04:33 PM    CHLORIDE 103 12/05/2021 04:33 PM    CO2 24 12/05/2021 04:33 PM    GLUCOSE 93 12/05/2021 04:33 PM    BUN 19 12/05/2021 04:33 PM    CREATININE 0.74 12/05/2021 04:33 PM      Lab Results   Component Value Date/Time    WBC 6.1 12/05/2021 04:33 PM    RBC 4.46 12/05/2021 04:33 PM    HEMOGLOBIN 14.2 12/05/2021 04:33 PM    HEMATOCRIT 42.0 12/05/2021 04:33 PM    MCV 94.2 12/05/2021 04:33 PM    MCH 31.8 12/05/2021 04:33 PM    MCHC 33.8 12/05/2021 04:33 PM    MPV 11.3 12/05/2021 04:33 PM    NEUTSPOLYS 65.70 12/05/2021 04:33 PM    LYMPHOCYTES 18.10 (L) 12/05/2021 04:33 PM    MONOCYTES 9.30 12/05/2021 04:33 PM    EOSINOPHILS 5.70 12/05/2021 04:33 PM    BASOPHILS 1.00 12/05/2021 04:33 PM      Lab Results   Component Value Date/Time    CALCIUM 9.0 12/05/2021 04:33 PM    ASTSGOT 26 12/05/2021 04:33 PM    ALTSGPT 29 12/05/2021 04:33 PM    ALKPHOSPHAT 49 12/05/2021 04:33 PM    TBILIRUBIN 0.5 12/05/2021 04:33 PM    ALBUMIN 3.9 12/05/2021 04:33 PM    TOTPROTEIN 6.6 12/05/2021 04:33 PM     Lab Results   Component Value Date/Time    URICACID 4.5 06/29/2016 02:03 PM    RHEUMFACTN <10 06/29/2016 02:03 PM    CCPANTIBODY 4 06/29/2016 02:03 PM     Lab Results   Component Value Date/Time    TONIOTETREVINS 11 07/07/2018 11:25 AM     Lab  Results   Component Value Date/Time    HBA1C 5.5 05/16/2016 07:50 AM     Lab Results   Component Value Date/Time    G6PD 13.6 05/20/2019 10:01 AM     Lab Results   Component Value Date/Time    CPKTOTAL 61 06/29/2016 02:03 PM     Assessment and Plan:     1. Primary osteoarthritis involving multiple joints  Unable to take NSAIDs because of chronic anticoagulation, we currently have patient on Plaquenil 200 mg p.o. twice daily, however patient does have issues with macular degeneration and feels that her ophthalmologist Dr. Savage would like her to be off of the Plaquenil.  Only other option would be narcotic pain medications or Tylenol for patient.  Patient would like to try decreasing Plaquenil to 1 tablet a day or alternating 1 tablet to 2 tablets every other day to see how she does with the lower dose of hydroxychloroquine.  - Comp Metabolic Panel; Future  - CBC WITH DIFFERENTIAL; Future  - Sed Rate; Future    2. Long-term use of Plaquenil  Currently on Plaquenil 200 mg p.o. twice daily, patient going to try to decrease to Plaquenil 200 mg p.o. daily or possibly alternate 2 mg with 400 mg every other day.  Of note G6PD levels are adequate, patient needs monitoring labs every 6 months, next labs due June 2022, labs ordered for patient  Needs ophthalmology evaluation every year, next ophthalmology evaluation October 2022.  - Comp Metabolic Panel; Future  - CBC WITH DIFFERENTIAL; Future  - Sed Rate; Future    3. Osteoporosis without current pathological fracture, unspecified osteoporosis type  Last DEXA June 2020 Next DEXA June 2022 will order at next visit  Patient currently on Prolia 60 mg subcu every 6 months   continue calcium citrate 1200 mg by mouth daily and vitamin D about 2000 units by mouth daily and magnesium 200 mg by mouth daily    4. Chronic anticoagulation  This will impact with kind of medications we can use for this patient's arthritis, NSAIDs are contraindicated because of increased risk of bleeding  while on chronic anticoagulation    5. Protein S deficiency (HCC)  Currently on chronic anticoagulation    6. Essential hypertension, benign  May impact the type of medications we can use for this patient's arthritis. We will have to keep this under advisement.    7. Macular degeneration of both eyes, unspecified type  Followed by Dr. Savage ophthalmology    8. Neuropathy  Of bilateral lower extremities left worse than right, secondary to spinal stenosis.  Currently on gabapentin    Followup: Return in about 6 months (around 6/27/2022). or sooner jose eduardo [place] Kareen  was seen 30 minutes face-to-face of which more than 50% of the time was spent counseling the patient (excluding time for procedures)  regarding  rheumatological condition and care. Therapy was discussed in detail.      Please note that this dictation was created using voice recognition software. I have made every reasonable attempt to correct obvious errors, but I expect that there are errors of grammar and possibly content that I did not discover before finalizing the note.

## 2021-12-30 DIAGNOSIS — K21.9 GASTROESOPHAGEAL REFLUX DISEASE WITHOUT ESOPHAGITIS: ICD-10-CM

## 2021-12-30 RX ORDER — OMEPRAZOLE 20 MG/1
20 CAPSULE, DELAYED RELEASE ORAL DAILY
Qty: 90 CAPSULE | Refills: 0 | Status: SHIPPED | OUTPATIENT
Start: 2021-12-30 | End: 2022-04-04 | Stop reason: SDUPTHER

## 2021-12-30 NOTE — TELEPHONE ENCOUNTER
Requested Prescriptions     Pending Prescriptions Disp Refills   • omeprazole (PRILOSEC) 20 MG delayed-release capsule 90 Capsule 0     Sig: Take 1 Capsule by mouth every day.       Celia Bahena A.P.R.N.

## 2022-01-11 ENCOUNTER — OFFICE VISIT (OUTPATIENT)
Dept: MEDICAL GROUP | Facility: PHYSICIAN GROUP | Age: 87
End: 2022-01-11
Payer: MEDICARE

## 2022-01-11 VITALS
HEIGHT: 63 IN | OXYGEN SATURATION: 96 % | TEMPERATURE: 97.2 F | SYSTOLIC BLOOD PRESSURE: 136 MMHG | RESPIRATION RATE: 14 BRPM | HEART RATE: 84 BPM | BODY MASS INDEX: 28.53 KG/M2 | WEIGHT: 161 LBS | DIASTOLIC BLOOD PRESSURE: 74 MMHG

## 2022-01-11 DIAGNOSIS — R19.7 DIARRHEA, UNSPECIFIED TYPE: ICD-10-CM

## 2022-01-11 PROCEDURE — 99213 OFFICE O/P EST LOW 20 MIN: CPT | Performed by: NURSE PRACTITIONER

## 2022-01-11 ASSESSMENT — PATIENT HEALTH QUESTIONNAIRE - PHQ9: CLINICAL INTERPRETATION OF PHQ2 SCORE: 0

## 2022-01-11 ASSESSMENT — FIBROSIS 4 INDEX: FIB4 SCORE: 2.27

## 2022-01-11 NOTE — PROGRESS NOTES
Chief Complaint   Patient presents with   • Diarrhea     on and off since december/ abd pain x 2 weeks        HISTORY OF PRESENT ILLNESS: Marry Mathur is a 86 y.o. female established patient who presents today to discuss:  Problem   Diarrhea    States having frequent diarrhea with bloating the past year; recently started having more acid reflux with abdominal pain that starts epigastric then spreads to whole abdomen after each meal. Hx-colon cancer with resection 2019; she is requesting to see GI Dr Lizarraga at [facility].          Allergies:Patient has no known allergies.    Current Outpatient Medications   Medication Sig Dispense Refill   • omeprazole (PRILOSEC) 20 MG delayed-release capsule Take 1 Capsule by mouth every day. 90 Capsule 0   • SF 5000 PLUS 1.1 % Cream      • lidocaine (LIDODERM) 5 % Patch APPLY ONE PATCH TO CLEAN DRY SKIN AS DIRECTED DAILY 90 Patch 1   • Calcium Carb-Cholecalciferol (CALCIUM 500 + D) 500-400 MG-UNIT Tab Take 1 Tablet by mouth at bedtime.     • gabapentin (NEURONTIN) 300 MG Cap 1 tab p.o. nightly (Patient taking differently: Take 300 mg by mouth at bedtime. 1 tab p.o. nightly) 90 Capsule 0   • rivaroxaban (XARELTO) 20 MG Tab tablet Take 1 Tablet by mouth with dinner. 90 Tablet 1   • hydroxychloroquine (PLAQUENIL) 200 MG Tab Take 1 tablet by mouth 2 times a day. 180 tablet 1   • pregabalin (LYRICA) 150 MG Cap Take 150 mg by mouth every day.     • atorvastatin (LIPITOR) 10 MG Tab Take 1 tablet by mouth every evening. 90 tablet 3   • Potassium 99 MG Tab Take 99 mg by mouth every day.     • Diphenhydramine-APAP, sleep, (TYLENOL PM EXTRA STRENGTH)  MG Tab Take 2 Tabs by mouth every bedtime.     • acetaminophen (TYLENOL) 500 MG Tab Take 1,000 mg by mouth 2 Times a Day.     • vitamin D (CHOLECALCIFEROL) 1000 UNIT Tab Take 1,000 Units by mouth every morning.     • amoxicillin (AMOXIL) 500 MG Cap TAKE 4 CAPSULES ONE HOUR PRIOR TO DENTAL APPOINTMENT (Patient not taking:  "Reported on 1/11/2022)     • Melatonin 10 MG Tab Take 20 mg by mouth at bedtime.       No current facility-administered medications for this visit.     .  Past Surgical History:   Procedure Laterality Date   • LUMBAR MEDIAL BRANCH BLOCKS Bilateral 12/15/2020    Procedure: BLOCK, NERVE, SPINAL, LUMBAR, POSTERIOR RAMUS, MEDIAL BRANCH;  Surgeon: Chris Cooper M.D.;  [facility];  Service: Pain Management   • IRRIGATION & DEBRIDEMENT ORTHO Bilateral 7/31/2020    Procedure: IRRIGATION AND DEBRIDEMENT, WOUND - KNEE;  Surgeon: Roosevelt Saucedo M.D.;  Location: SURGERY [facility];  Service: Orthopedics   • HEMICOLECTOMY Right 12/13/2018    Procedure: OPEN RIGHT HEMICOLECTOMY;  Surgeon: Dash Bhagat M.D.;  Location: SURGERY [facility];  Service: General   • INGUINAL HERNIA REPAIR Right 9/23/2016    Procedure: INGUINAL HERNIA REPAIR - PRIMARY;  Surgeon: Mary Medina M.D.;  Location: SURGERY [facility];  Service:    • OTHER  08/12/2014    peroneal nerve surgery Dr. Castro    • OTHER ORTHOPEDIC SURGERY  2014    left knee partial   • OTHER ORTHOPEDIC SURGERY  2011    meniscus repair   • ARTHROPLASTY      partial TKA with Dr. Persaud   • CHOLECYSTECTOMY     • HYSTERECTOMY, TOTAL ABDOMINAL  1970's   • ROTATOR CUFF REPAIR Bilateral      Health Maintenance: deferred  Review of Systems -included above  Exam:   /74 (BP Location: Left arm, Patient Position: Sitting, BP Cuff Size: Adult)   Pulse 84   Temp 36.2 °C (97.2 °F) (Temporal)   Resp 14   Ht 1.598 m (5' 2.9\")   Wt 73 kg (161 lb)   SpO2 96%   Body mass index is 28.61 kg/m².   General:  Well nourished, well developed female in NAD, appropriate and cooperative with exam.  Lungs: Clear and equal with good air movement.  Normal effort. No rales, ronchi, or wheezing.   Cardiovascular: Regular rate and rhythm, S1, S2; +ve murmur. Pedal pulses 2+ bilaterally. No edema  Abdomen: Soft, round, hyperactive BS. Epigastric " tenderness on palpation    Assessment/Plan:  Problem List Items Addressed This Visit     Diarrhea     - increasing diarrhea/bloating the past year, worsening abdominal pain the past 2 weeks; Hx of colon cancer s/p resection; Urgent GI referral submitted for her to see Dr Lizarraga at [facility]  - she has prescription for omeprazole 20mg QD (she will check her mail order); states she has been taking consistently & no difference in pain; she cut out coffee completely when pain started         Relevant Orders    Referral to Gastroenterology           Follow up:  Return if symptoms worsen or fail to improve.   - she will call for PCP appointment after she sees GI    Educated in proper administration of medication(s) ordered today including safety, possible SE, risks, benefits, rationale and alternatives to therapy.       Please note that this dictation was created using voice recognition software. I have made every reasonable attempt to correct obvious errors, but I expect that there are errors of grammar and possibly content that I did not discover before finalizing the note.

## 2022-01-12 NOTE — ASSESSMENT & PLAN NOTE
- increasing diarrhea/bloating the past year, worsening abdominal pain the past 2 weeks; Hx of colon cancer s/p resection; Urgent GI referral submitted for her to see Dr Lizarraga at [facility]  - she has prescription for omeprazole 20mg QD (she will check her mail order); states she has been taking consistently & no difference in pain; she cut out coffee completely when pain started

## 2022-01-12 NOTE — ASSESSMENT & PLAN NOTE
- unresolved diarrhea with bloating, abdominal pain with meals; GI referral per patient request to see Dr Lizarraga at [facility].

## 2022-01-25 ENCOUNTER — APPOINTMENT (RX ONLY)
Dept: URBAN - METROPOLITAN AREA CLINIC 4 | Facility: CLINIC | Age: 87
Setting detail: DERMATOLOGY
End: 2022-01-25

## 2022-01-25 DIAGNOSIS — L81.4 OTHER MELANIN HYPERPIGMENTATION: ICD-10-CM

## 2022-01-25 DIAGNOSIS — D18.0 HEMANGIOMA: ICD-10-CM

## 2022-01-25 DIAGNOSIS — L57.0 ACTINIC KERATOSIS: ICD-10-CM

## 2022-01-25 DIAGNOSIS — Z86.006 PERSONAL HISTORY OF MELANOMA IN-SITU: ICD-10-CM

## 2022-01-25 DIAGNOSIS — D22 MELANOCYTIC NEVI: ICD-10-CM

## 2022-01-25 DIAGNOSIS — L57.8 OTHER SKIN CHANGES DUE TO CHRONIC EXPOSURE TO NONIONIZING RADIATION: ICD-10-CM

## 2022-01-25 DIAGNOSIS — L82.1 OTHER SEBORRHEIC KERATOSIS: ICD-10-CM

## 2022-01-25 DIAGNOSIS — Z85.828 PERSONAL HISTORY OF OTHER MALIGNANT NEOPLASM OF SKIN: ICD-10-CM

## 2022-01-25 PROBLEM — D22.5 MELANOCYTIC NEVI OF TRUNK: Status: ACTIVE | Noted: 2022-01-25

## 2022-01-25 PROBLEM — D48.5 NEOPLASM OF UNCERTAIN BEHAVIOR OF SKIN: Status: ACTIVE | Noted: 2022-01-25

## 2022-01-25 PROBLEM — D18.01 HEMANGIOMA OF SKIN AND SUBCUTANEOUS TISSUE: Status: ACTIVE | Noted: 2022-01-25

## 2022-01-25 PROCEDURE — 11102 TANGNTL BX SKIN SINGLE LES: CPT

## 2022-01-25 PROCEDURE — ? COUNSELING

## 2022-01-25 PROCEDURE — ? LIQUID NITROGEN

## 2022-01-25 PROCEDURE — 99213 OFFICE O/P EST LOW 20 MIN: CPT | Mod: 25

## 2022-01-25 PROCEDURE — ? BIOPSY BY SHAVE METHOD

## 2022-01-25 PROCEDURE — 17003 DESTRUCT PREMALG LES 2-14: CPT

## 2022-01-25 PROCEDURE — 17000 DESTRUCT PREMALG LESION: CPT | Mod: 59

## 2022-01-25 ASSESSMENT — LOCATION DETAILED DESCRIPTION DERM
LOCATION DETAILED: LEFT CENTRAL MALAR CHEEK
LOCATION DETAILED: RIGHT DORSAL HAND
LOCATION DETAILED: RIGHT MEDIAL MALAR CHEEK
LOCATION DETAILED: RIGHT DORSAL FOREARM
LOCATION DETAILED: LEFT MID BACK
LOCATION DETAILED: LEFT ANTERIOR THIGH
LOCATION DETAILED: LEFT LATERAL SUPERIOR CHEST
LOCATION DETAILED: LEFT DORSAL FOREARM
LOCATION DETAILED: LEFT MEDIAL MALAR CHEEK
LOCATION DETAILED: LEFT DORSAL HAND
LOCATION DETAILED: RIGHT UPPER BACK
LOCATION DETAILED: LEFT CHEEK
LOCATION DETAILED: LEFT LATERAL EYEBROW
LOCATION DETAILED: LEFT UPPER BACK
LOCATION DETAILED: RIGHT ANTERIOR THIGH
LOCATION DETAILED: RIGHT CHEEK
LOCATION DETAILED: UPPER STERNUM
LOCATION DETAILED: RIGHT MEDIAL SUPERIOR CHEST

## 2022-01-25 ASSESSMENT — LOCATION ZONE DERM
LOCATION ZONE: HAND
LOCATION ZONE: FACE
LOCATION ZONE: TRUNK
LOCATION ZONE: ARM
LOCATION ZONE: LEG

## 2022-01-25 ASSESSMENT — LOCATION SIMPLE DESCRIPTION DERM
LOCATION SIMPLE: BACK
LOCATION SIMPLE: LEFT EYEBROW
LOCATION SIMPLE: LEFT HAND
LOCATION SIMPLE: CHEST
LOCATION SIMPLE: RIGHT CHEEK
LOCATION SIMPLE: LEFT LOWER EXTREMITY
LOCATION SIMPLE: LEFT UPPER EXTREMITY
LOCATION SIMPLE: LEFT CHEEK
LOCATION SIMPLE: RIGHT UPPER EXTREMITY
LOCATION SIMPLE: RIGHT HAND
LOCATION SIMPLE: RIGHT LOWER EXTREMITY

## 2022-01-25 NOTE — PROCEDURE: LIQUID NITROGEN
Aperture Size (Optional): C
Consent: The patient's consent was obtained including but not limited to risks of crusting, scabbing, blistering, scarring, darker or lighter pigmentary change, recurrence, incomplete removal and infection.
Render Post-Care Instructions In Note?: no
Number Of Freeze-Thaw Cycles: 2 freeze-thaw cycles
Duration Of Freeze Thaw-Cycle (Seconds): 3
Post-Care Instructions: I reviewed with the patient in detail post-care instructions. Patient is to wear sunprotection, and avoid picking at any of the treated lesions. Pt may apply Vaseline to crusted or scabbing areas.
Show Applicator Variable?: Yes
Detail Level: Simple

## 2022-02-01 ENCOUNTER — APPOINTMENT (RX ONLY)
Dept: URBAN - METROPOLITAN AREA CLINIC 36 | Facility: CLINIC | Age: 87
Setting detail: DERMATOLOGY
End: 2022-02-01

## 2022-02-01 DIAGNOSIS — Z41.9 ENCOUNTER FOR PROCEDURE FOR PURPOSES OTHER THAN REMEDYING HEALTH STATE, UNSPECIFIED: ICD-10-CM

## 2022-02-01 PROCEDURE — ? FRAXEL

## 2022-02-01 ASSESSMENT — LOCATION SIMPLE DESCRIPTION DERM: LOCATION SIMPLE: RIGHT FOREHEAD

## 2022-02-01 ASSESSMENT — LOCATION ZONE DERM: LOCATION ZONE: FACE

## 2022-02-01 ASSESSMENT — LOCATION DETAILED DESCRIPTION DERM: LOCATION DETAILED: RIGHT MEDIAL FOREHEAD

## 2022-02-01 NOTE — PROCEDURE: FRAXEL
Energy(Mj/Cm2): 1
Treatment Level: 4
Consent: Written consent obtained, risks reviewed including but not limited to pain and incomplete improvement.
Add Post-Care Below To The Note: No
Detail Level: Zone
Wavelength: 1550nm
Post-Care Instructions: I reviewed with the patient in detail post-care instructions. Patient should avoid sun until area fully healed.
Location: forehead
Indication: surgical scars
External Cooling Fan Speed: 5
Energy(Mj/Cm2): 60

## 2022-02-13 ENCOUNTER — OFFICE VISIT (OUTPATIENT)
Dept: URGENT CARE | Facility: PHYSICIAN GROUP | Age: 87
End: 2022-02-13
Payer: MEDICARE

## 2022-02-13 ENCOUNTER — HOSPITAL ENCOUNTER (OUTPATIENT)
Facility: MEDICAL CENTER | Age: 87
End: 2022-02-13
Attending: FAMILY MEDICINE
Payer: MEDICARE

## 2022-02-13 VITALS
DIASTOLIC BLOOD PRESSURE: 84 MMHG | HEART RATE: 78 BPM | WEIGHT: 159 LBS | SYSTOLIC BLOOD PRESSURE: 126 MMHG | TEMPERATURE: 97.5 F | RESPIRATION RATE: 18 BRPM | BODY MASS INDEX: 28.17 KG/M2 | HEIGHT: 63 IN | OXYGEN SATURATION: 98 %

## 2022-02-13 DIAGNOSIS — Z20.822 SUSPECTED COVID-19 VIRUS INFECTION: ICD-10-CM

## 2022-02-13 DIAGNOSIS — Z20.822 EXPOSURE TO COVID-19 VIRUS: ICD-10-CM

## 2022-02-13 DIAGNOSIS — J02.9 PHARYNGITIS, UNSPECIFIED ETIOLOGY: ICD-10-CM

## 2022-02-13 LAB — COVID ORDER STATUS COVID19: NORMAL

## 2022-02-13 PROCEDURE — U0003 INFECTIOUS AGENT DETECTION BY NUCLEIC ACID (DNA OR RNA); SEVERE ACUTE RESPIRATORY SYNDROME CORONAVIRUS 2 (SARS-COV-2) (CORONAVIRUS DISEASE [COVID-19]), AMPLIFIED PROBE TECHNIQUE, MAKING USE OF HIGH THROUGHPUT TECHNOLOGIES AS DESCRIBED BY CMS-2020-01-R: HCPCS

## 2022-02-13 PROCEDURE — U0005 INFEC AGEN DETEC AMPLI PROBE: HCPCS

## 2022-02-13 PROCEDURE — 99213 OFFICE O/P EST LOW 20 MIN: CPT | Mod: CS | Performed by: FAMILY MEDICINE

## 2022-02-13 ASSESSMENT — ENCOUNTER SYMPTOMS
SHORTNESS OF BREATH: 0
VOMITING: 0
DIZZINESS: 0
MYALGIAS: 0
CHILLS: 0
COUGH: 0
SORE THROAT: 1
FEVER: 0
TROUBLE SWALLOWING: 0
NAUSEA: 0

## 2022-02-13 ASSESSMENT — FIBROSIS 4 INDEX: FIB4 SCORE: 2.27

## 2022-02-13 NOTE — PATIENT INSTRUCTIONS
Pharyngitis    Pharyngitis is a sore throat (pharynx). This is when there is redness, pain, and swelling in your throat. Most of the time, this condition gets better on its own. In some cases, you may need medicine.  Follow these instructions at home:  · Take over-the-counter and prescription medicines only as told by your doctor.  ? If you were prescribed an antibiotic medicine, take it as told by your doctor. Do not stop taking the antibiotic even if you start to feel better.  ? Do not give children aspirin. Aspirin has been linked to Reye syndrome.  · Drink enough water and fluids to keep your pee (urine) clear or pale yellow.  · Get a lot of rest.  · Rinse your mouth (gargle) with a salt-water mixture 3-4 times a day or as needed. To make a salt-water mixture, completely dissolve ½-1 tsp of salt in 1 cup of warm water.  · If your doctor approves, you may use throat lozenges or sprays to soothe your throat.  Contact a doctor if:  · You have large, tender lumps in your neck.  · You have a rash.  · You cough up green, yellow-brown, or bloody spit.  Get help right away if:  · You have a stiff neck.  · You drool or cannot swallow liquids.  · You cannot drink or take medicines without throwing up.  · You have very bad pain that does not go away with medicine.  · You have problems breathing, and it is not from a stuffy nose.  · You have new pain and swelling in your knees, ankles, wrists, or elbows.  Summary  · Pharyngitis is a sore throat (pharynx). This is when there is redness, pain, and swelling in your throat.  · If you were prescribed an antibiotic medicine, take it as told by your doctor. Do not stop taking the antibiotic even if you start to feel better.  · Most of the time, pharyngitis gets better on its own. Sometimes, you may need medicine.  This information is not intended to replace advice given to you by your health care provider. Make sure you discuss any questions you have with your health care  provider.  Document Released: 06/05/2009 Document Revised: 11/30/2018 Document Reviewed: 01/23/2018  [employer] Patient Education © 2020 [employer]    INSTRUCTIONS FOR COVID-19 OR ANY OTHER INFECTIOUS RESPIRATORY ILLNESSES    The Centers for Disease Control and Prevention (CDC) states that early indications for COVID-19 include cough, shortness of breath, difficulty breathing, or at least two of the following symptoms: chills, shaking with chills, muscle pain, headache, sore throat, and loss of taste or smell. Symptoms can range from mild to severe and may appear up to two weeks after exposure to the virus.    The practice of self-isolation and quarantine helps protect the public and your family by  preventing exposure to people who have or may have a contagious disease. Please follow the prevention steps below as based on CDC guidelines:    WHEN TO STOP ISOLATION: Persons with COVID-19 or any other infectious respiratory illness who have symptoms and were advised to care for themselves at home may discontinue home isolation under the following conditions:  · At least 24 hours have passed since recovery defined as resolution of fever without the use of fever-reducing medications; AND,  · Improvement in respiratory symptoms (e.g., cough, shortness of breath); AND,  · At least 10 days have passed since symptoms first appeared and have had no subsequent illness.    MONITOR YOUR SYMPTOMS: If your illness is worsening, seek prompt medical attention. If you have a medical emergency and need to call 911, notify the dispatch personnel that you have, or are being evaluated for confirmed or suspected COVID-19 or another infectious respiratory illness. Wear a facemask if possible.    ACTIVITY RESTRICTION: restrict activities outside your home, except for getting medical care. Do not go to work, school, or public areas. Avoid using public transportation, ride-sharing, or taxis.    SCHEDULED MEDICAL APPOINTMENTS: Notify your  provider that you have, or are being evaluated for, confirmed or suspected COVID-19 or another infectious respiratory. This will help the healthcare provider’s office safely take care of you and keep other people from getting exposed or infected.    FACEMASKS, when to wear: Anytime you are away from your home or around other people or pets. If you are unable to wear one, maintain a minimum of 6 feet distancing from others.    LIVING ENVIRONMENT: Stay in a separate room from other people and pets. If possible, use a separate bathroom, have someone else care for your pets and avoid sharing household items. Any items used should be washed thoroughly with soap and water. Clean all “high-touch” surfaces every day. Use a household cleaning spray or wipe, according to the label instructions. High touch surfaces include (but are not limited to) counters, tabletops, doorknobs, bathroom fixtures, toilets, phones, keyboards, tablets, and bedside tables.     HAND WASHING: Frequently wash hands with soap and water for at least 20 seconds,  especially after blowing your nose, coughing, or sneezing; going to the bathroom; before and after interacting with pets; and before and after eating or preparing food. If hands are visibly dirty use soap and water. If soap and water are not available, use an alcohol-based hand  with at least 60% alcohol. Avoid touching your eyes, nose, and mouth with unwashed hands. Cover your coughs and sneezes with a tissue. Throw used tissues in a lined trash can. Immediately wash your hands.    ACTIVE/FACILITATED SELF-MONITORING: Follow instructions provided by your local health department or health professionals, as appropriate. When working with your local health department check their available hours.    [facility]   Phone Number   [place] [phone]   [facility] [phone]   [place] Call 211   [place] [phone]     IF YOU HAVE CONFIRMED POSITIVE  COVID-19:    Those who have completely recovered from COVID-19 may have immune-boosting antibodies in their plasma--called “convalescent plasma”--that could be used to treat critically ill COVID19 patients.    [facility] is excited to begin working with [facility] on collecting convalescent plasma from  people who have recovered from COVID-19 as part of a program to treat patients infected with the virus. This FDA-approved “emergency investigational new drug” is a special blood product containing antibodies that may give patients an extra boost to fight the virus.    To be eligible to donate convalescent plasma, you must have a prior COVID-19 diagnosis documented by a laboratory test (or a positive test result for SARS-CoV-2 antibodies) and meet additional eligibility requirements.    If you are interested in donating convalescent plasma or have any additional questions, please contact the [facility] Convalescent Plasma  at [phone] or via e-mail at [email or web address].

## 2022-02-13 NOTE — PROGRESS NOTES
Subjective:   Marry Mathur is a 86 y.o. female who presents for Pharyngitis (x 1 day and not feeling very well)        Pharyngitis   This is a new problem. The current episode started yesterday. The problem has been unchanged. There has been no fever. Pertinent negatives include no coughing, shortness of breath, trouble swallowing or vomiting. Associated symptoms comments: There has been community-wide COVID-19 exposure, the patient reports known direct COVID-19 exposure  . She has tried cool liquids and gargles for the symptoms. The treatment provided no relief.     PMH:  has a past medical history of Adenocarcinoma of colon ([facility]) (10/30/2018), Anesthesia, Atrial fibrillation [I48.91] (4/19/2016), Back pain (6/27/2012), Blood clotting disorder ([facility]) (2012), Bowel habit changes, Cancer ([facility]), Cataract, Chronic back pain greater than 3 months duration, Deep vein thrombosis ([facility]) (3/8/2016), Essential hypertension, benign (6/27/2012), GERD (gastroesophageal reflux disease) (6/27/2012), Heart murmur, Hyperlipidemia, Hypertension, Impaired fasting glucose (11/2/2017), Mild aortic stenosis (11/2/2017), Other and unspecified hyperlipidemia (6/27/2012), Prediabetes, Protein S deficiency ([facility]) (11/2/2017), Rheumatoid arthritis involving multiple sites with positive rheumatoid factor ([facility]) (03/14/2016), Rheumatoid nodule ([facility]) (7/25/2017), Right bundle branch block (6/27/2012), and Urinary incontinence (11/2/2017).  MEDS:   Current Outpatient Medications:   •  hydroxychloroquine (PLAQUENIL) 200 MG Tab, 1 tablet p.o. daily, Disp: 90 Tablet, Rfl: 1  •  omeprazole (PRILOSEC) 20 MG delayed-release capsule, Take 1 Capsule by mouth every day., Disp: 90 Capsule, Rfl: 0  •  SF 5000 PLUS 1.1 % Cream, , Disp: , Rfl:   •  lidocaine (LIDODERM) 5 % Patch, APPLY ONE PATCH TO CLEAN DRY SKIN AS DIRECTED DAILY, Disp: 90 Patch, Rfl: 1  •  Calcium Carb-Cholecalciferol (CALCIUM 500 + D) 500-400 MG-UNIT Tab, Take 1 Tablet by mouth at  bedtime., Disp: , Rfl:   •  gabapentin (NEURONTIN) 300 MG Cap, 1 tab p.o. nightly (Patient taking differently: Take 300 mg by mouth at bedtime. 1 tab p.o. nightly), Disp: 90 Capsule, Rfl: 0  •  rivaroxaban (XARELTO) 20 MG Tab tablet, Take 1 Tablet by mouth with dinner., Disp: 90 Tablet, Rfl: 1  •  pregabalin (LYRICA) 150 MG Cap, Take 150 mg by mouth every day., Disp: , Rfl:   •  atorvastatin (LIPITOR) 10 MG Tab, Take 1 tablet by mouth every evening., Disp: 90 tablet, Rfl: 3  •  Potassium 99 MG Tab, Take 99 mg by mouth every day., Disp: , Rfl:   •  Diphenhydramine-APAP, sleep, (TYLENOL PM EXTRA STRENGTH)  MG Tab, Take 2 Tabs by mouth every bedtime., Disp: , Rfl:   •  acetaminophen (TYLENOL) 500 MG Tab, Take 1,000 mg by mouth 2 Times a Day., Disp: , Rfl:   •  Melatonin 10 MG Tab, Take 20 mg by mouth at bedtime., Disp: , Rfl:   •  vitamin D (CHOLECALCIFEROL) 1000 UNIT Tab, Take 1,000 Units by mouth every morning., Disp: , Rfl:   ALLERGIES: No Known Allergies  SURGHX:   Past Surgical History:   Procedure Laterality Date   • LUMBAR MEDIAL BRANCH BLOCKS Bilateral 12/15/2020    Procedure: BLOCK, NERVE, SPINAL, LUMBAR, POSTERIOR RAMUS, MEDIAL BRANCH;  Surgeon: Chris Cooper M.D.;  [facility];  Service: Pain Management   • IRRIGATION & DEBRIDEMENT ORTHO Bilateral 7/31/2020    Procedure: IRRIGATION AND DEBRIDEMENT, WOUND - KNEE;  Surgeon: Roosevelt Saucedo M.D.;  Location: SURGERY [facility];  Service: Orthopedics   • HEMICOLECTOMY Right 12/13/2018    Procedure: OPEN RIGHT HEMICOLECTOMY;  Surgeon: Dash Bhagat M.D.;  Location: SURGERY [facility];  Service: General   • INGUINAL HERNIA REPAIR Right 9/23/2016    Procedure: INGUINAL HERNIA REPAIR - PRIMARY;  Surgeon: Mary Medina M.D.;  Location: SURGERY [facility];  Service:    • OTHER  08/12/2014    peroneal nerve surgery Dr. Castro    • OTHER ORTHOPEDIC SURGERY  2014    left knee partial   • OTHER ORTHOPEDIC  "SURGERY  2011    meniscus repair   • ARTHROPLASTY      partial TKA with Dr. Persaud   • CHOLECYSTECTOMY     • HYSTERECTOMY, TOTAL ABDOMINAL  1970's   • ROTATOR CUFF REPAIR Bilateral      SOCHX:  reports that she has never smoked. She has never used smokeless tobacco. She reports previous alcohol use. She reports that she does not use drugs.  FH:   Family History   Problem Relation Age of Onset   • Cancer Mother         stomach   • Cancer Father         colon   • Leukemia Father         many exposure, worked for [employer]    • Heart Disease Brother         s/p stent      Review of Systems   Constitutional: Negative for chills and fever.   HENT: Positive for sore throat. Negative for trouble swallowing.    Respiratory: Negative for cough and shortness of breath.    Gastrointestinal: Negative for nausea and vomiting.   Musculoskeletal: Negative for myalgias.   Skin: Negative for rash.   Neurological: Negative for dizziness.        Objective:   /84 (BP Location: Left arm, Patient Position: Sitting, BP Cuff Size: Adult long)   Pulse 78   Temp 36.4 °C (97.5 °F) (Temporal)   Resp 18   Ht 1.6 m (5' 3\")   Wt 72.1 kg (159 lb)   SpO2 98%   BMI 28.17 kg/m²   Physical Exam  Vitals and nursing note reviewed.   Constitutional:       General: She is not in acute distress.     Appearance: She is well-developed.   HENT:      Head: Normocephalic and atraumatic.      Right Ear: External ear normal.      Left Ear: External ear normal.      Nose: Rhinorrhea present.      Mouth/Throat:      Mouth: Mucous membranes are moist.      Pharynx: Posterior oropharyngeal erythema present. No oropharyngeal exudate.   Eyes:      Conjunctiva/sclera: Conjunctivae normal.   Cardiovascular:      Rate and Rhythm: Normal rate.   Pulmonary:      Effort: Pulmonary effort is normal. No respiratory distress.      Breath sounds: Normal breath sounds. No wheezing or rhonchi.   Abdominal:      General: There is no distension.   Musculoskeletal:   "       General: Normal range of motion.   Skin:     General: Skin is warm and dry.   Neurological:      General: No focal deficit present.      Mental Status: She is alert and oriented to person, place, and time. Mental status is at baseline.      Gait: Gait (gait at baseline) normal.   Psychiatric:         Judgment: Judgment normal.           Assessment/Plan:   1. Pharyngitis, unspecified etiology    2. Suspected COVID-19 virus infection  - SARS-CoV-2 PCR (24 hour In-House): Collect NP swab in VTM; Future    3. Exposure to COVID-19 virus  - SARS-CoV-2 PCR (24 hour In-House): Collect NP swab in VTM; Future      Advised routine [facility] social distancing guidelines, symptomatic and supportive measures        Medical Decision Making/Course:  In the course of preparing for this visit with review of the pertinent past medical history, recent and past clinic visits, current medications, and performing chart, immunization, medical history and medication reconciliation, and in the further course of obtaining the current history pertinent to the clinic visit today, performing an exam and evaluation, ordering and independently evaluating labs, tests including SARS CoV-2 by PCR testing   , and/or procedures, prescribing any recommended new medications as noted above, providing any pertinent counseling and education and recommending further coordination of care, at least  15 minutes of total time were spent during this encounter.      Discussed close monitoring, return precautions, and supportive measures of maintaining adequate fluid hydration and caloric intake, relative rest and symptom management as needed for pain and/or fever.    Differential diagnosis, natural history, supportive care, and indications for immediate follow-up discussed.     Advised the patient to follow-up with the primary care physician for recheck, reevaluation, and consideration of further management.    Please note that this dictation was created using  voice recognition software. I have worked with consultants from the vendor as well as technical experts from [facility], [facility] to optimize the interface. I have made every reasonable attempt to correct obvious errors, but I expect that there are errors of grammar and possibly content that I did not discover before finalizing the note.

## 2022-02-14 LAB
SARS-COV-2 RNA RESP QL NAA+PROBE: NOTDETECTED
SPECIMEN SOURCE: NORMAL

## 2022-02-23 ENCOUNTER — TELEPHONE (OUTPATIENT)
Dept: MEDICAL GROUP | Facility: PHYSICIAN GROUP | Age: 87
End: 2022-02-23
Payer: MEDICARE

## 2022-02-23 NOTE — TELEPHONE ENCOUNTER
----- Message from CRISTINO Horner sent at 2/22/2022  6:07 PM PST -----  Can you call Marry and clarify if she is still taking Lyrica? If she is who prescribed?      ----- Message -----  From: Deanna Escoto M.D.  Sent: 2/22/2022  11:58 AM PST  To: CRISTINO Horner    Hi Saskia,   We have the mutual above patient, I noticed that you placed patient on Lyrica, are you comfortable with patient also taking gabapentin in light of both medications having potential neurological side effects and patient's age and risk of falling?  If you are concerned then I will stop the gabapentin and defer to you.  Yulisa Escoto MD  Rheumatology

## 2022-02-23 NOTE — PROGRESS NOTES
Reviewed  as concern from rheumatologist that patient taking both Lyrica and gapabentin. Review of patient's  report shows that Chris Cooper prescribed Lyrica with the last fill date of 7/19/2021 for #180.

## 2022-02-23 NOTE — TELEPHONE ENCOUNTER
Phone Number Called: [phone] (home)     Call outcome: Spoke to patient regarding message below.    Message: Patient stated she is not taking Lyrica.

## 2022-02-24 ENCOUNTER — APPOINTMENT (RX ONLY)
Dept: URBAN - METROPOLITAN AREA CLINIC 4 | Facility: CLINIC | Age: 87
Setting detail: DERMATOLOGY
End: 2022-02-24

## 2022-02-24 DIAGNOSIS — D18.0 HEMANGIOMA: ICD-10-CM

## 2022-02-24 DIAGNOSIS — Z86.006 PERSONAL HISTORY OF MELANOMA IN-SITU: ICD-10-CM

## 2022-02-24 DIAGNOSIS — D22 MELANOCYTIC NEVI: ICD-10-CM

## 2022-02-24 DIAGNOSIS — L82.1 OTHER SEBORRHEIC KERATOSIS: ICD-10-CM

## 2022-02-24 DIAGNOSIS — L81.4 OTHER MELANIN HYPERPIGMENTATION: ICD-10-CM

## 2022-02-24 DIAGNOSIS — L57.0 ACTINIC KERATOSIS: ICD-10-CM

## 2022-02-24 DIAGNOSIS — L57.8 OTHER SKIN CHANGES DUE TO CHRONIC EXPOSURE TO NONIONIZING RADIATION: ICD-10-CM

## 2022-02-24 DIAGNOSIS — Z85.828 PERSONAL HISTORY OF OTHER MALIGNANT NEOPLASM OF SKIN: ICD-10-CM

## 2022-02-24 PROBLEM — D22.5 MELANOCYTIC NEVI OF TRUNK: Status: ACTIVE | Noted: 2022-02-24

## 2022-02-24 PROBLEM — D18.01 HEMANGIOMA OF SKIN AND SUBCUTANEOUS TISSUE: Status: ACTIVE | Noted: 2022-02-24

## 2022-02-24 PROBLEM — C43.59 MALIGNANT MELANOMA OF OTHER PART OF TRUNK: Status: ACTIVE | Noted: 2022-02-24

## 2022-02-24 PROCEDURE — 17000 DESTRUCT PREMALG LESION: CPT

## 2022-02-24 PROCEDURE — ? REFERRAL CORRESPONDENCE

## 2022-02-24 PROCEDURE — 17003 DESTRUCT PREMALG LES 2-14: CPT

## 2022-02-24 PROCEDURE — 99213 OFFICE O/P EST LOW 20 MIN: CPT | Mod: 25

## 2022-02-24 PROCEDURE — ? COUNSELING

## 2022-02-24 PROCEDURE — ? LIQUID NITROGEN

## 2022-02-24 ASSESSMENT — LOCATION DETAILED DESCRIPTION DERM
LOCATION DETAILED: LEFT CHEEK
LOCATION DETAILED: RIGHT DORSAL HAND
LOCATION DETAILED: LEFT RADIAL DORSAL HAND
LOCATION DETAILED: LEFT LATERAL CANTHUS
LOCATION DETAILED: RIGHT DORSAL FOREARM
LOCATION DETAILED: LEFT DORSAL HAND
LOCATION DETAILED: LEFT ANTERIOR THIGH
LOCATION DETAILED: RIGHT CENTRAL MALAR CHEEK
LOCATION DETAILED: NASAL DORSUM
LOCATION DETAILED: RIGHT CHEEK
LOCATION DETAILED: NASAL SUPRATIP
LOCATION DETAILED: LEFT SUPERIOR PARIETAL SCALP
LOCATION DETAILED: LEFT DORSAL FOREARM
LOCATION DETAILED: RIGHT UPPER BACK
LOCATION DETAILED: LEFT UPPER BACK
LOCATION DETAILED: LEFT MID BACK
LOCATION DETAILED: RIGHT ANTERIOR THIGH

## 2022-02-24 ASSESSMENT — LOCATION SIMPLE DESCRIPTION DERM
LOCATION SIMPLE: LEFT LATERAL CANTHUS
LOCATION SIMPLE: LEFT UPPER EXTREMITY
LOCATION SIMPLE: BACK
LOCATION SIMPLE: RIGHT HAND
LOCATION SIMPLE: RIGHT UPPER EXTREMITY
LOCATION SIMPLE: LEFT HAND
LOCATION SIMPLE: SCALP
LOCATION SIMPLE: LEFT LOWER EXTREMITY
LOCATION SIMPLE: RIGHT LOWER EXTREMITY
LOCATION SIMPLE: RIGHT CHEEK
LOCATION SIMPLE: NOSE
LOCATION SIMPLE: LEFT CHEEK

## 2022-02-24 ASSESSMENT — LOCATION ZONE DERM
LOCATION ZONE: HAND
LOCATION ZONE: SCALP
LOCATION ZONE: ARM
LOCATION ZONE: NOSE
LOCATION ZONE: FACE
LOCATION ZONE: EYELID
LOCATION ZONE: TRUNK
LOCATION ZONE: LEG

## 2022-02-24 NOTE — PROCEDURE: COUNSELING
When Should The Patient Follow-Up For Their Next Full-Body Skin Exam?: 3 Months
Detail Level: Detailed
Quality 137: Melanoma: Continuity Of Care - Recall System: Patient information entered into a recall system that includes: target date for the next exam specified AND a process to follow up with patients regarding missed or unscheduled appointments
Detail Level: Zone
Show Follow-Up Variable: Yes

## 2022-02-24 NOTE — HPI: EVALUATION OF SKIN LESION(S)
How Severe Are Your Spot(S)?: mild
Have Your Spot(S) Been Treated In The Past?: has not been treated
Hpi Title: Evaluation of a Skin Lesion
Location: Lt. Pretibial region
Year Removed: 2020

## 2022-02-24 NOTE — PROCEDURE: LIQUID NITROGEN
Post-Care Instructions: I reviewed with the patient in detail post-care instructions. Patient is to wear sunprotection, and avoid picking at any of the treated lesions. Pt may apply Vaseline to crusted or scabbing areas.
Duration Of Freeze Thaw-Cycle (Seconds): 3
Render Note In Bullet Format When Appropriate: No
Show Aperture Variable?: Yes
Consent: The patient's consent was obtained including but not limited to risks of crusting, scabbing, blistering, scarring, darker or lighter pigmentary change, recurrence, incomplete removal and infection.
Aperture Size (Optional): C
Number Of Freeze-Thaw Cycles: 2 freeze-thaw cycles
Detail Level: Simple

## 2022-03-04 ENCOUNTER — OFFICE VISIT (OUTPATIENT)
Dept: CARDIOLOGY | Facility: MEDICAL CENTER | Age: 87
End: 2022-03-04
Payer: MEDICARE

## 2022-03-04 VITALS
BODY MASS INDEX: 28.35 KG/M2 | WEIGHT: 160 LBS | DIASTOLIC BLOOD PRESSURE: 62 MMHG | HEIGHT: 63 IN | SYSTOLIC BLOOD PRESSURE: 120 MMHG | RESPIRATION RATE: 14 BRPM | HEART RATE: 80 BPM | OXYGEN SATURATION: 96 %

## 2022-03-04 DIAGNOSIS — I35.0 AORTIC VALVE STENOSIS, ETIOLOGY OF CARDIAC VALVE DISEASE UNSPECIFIED: ICD-10-CM

## 2022-03-04 DIAGNOSIS — Z01.810 PRE-OPERATIVE CARDIOVASCULAR EXAMINATION: ICD-10-CM

## 2022-03-04 DIAGNOSIS — E78.5 HYPERLIPIDEMIA, UNSPECIFIED HYPERLIPIDEMIA TYPE: Chronic | ICD-10-CM

## 2022-03-04 PROCEDURE — 99214 OFFICE O/P EST MOD 30 MIN: CPT | Mod: 25 | Performed by: INTERNAL MEDICINE

## 2022-03-04 ASSESSMENT — FIBROSIS 4 INDEX: FIB4 SCORE: 2.27

## 2022-03-04 NOTE — LETTER
[facility]   [address] [place] [address]  [place] [address]  Phone: [phone]  Fax: [phone]              Marry Mathur  1935    Encounter Date: 3/4/2022    Darron Van M.D.          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 Briefly, she is undergoing evaluation for malignant melanoma on her left chest. She was referred to cardiology for further evaluation given her history of moderate aortic stenosis.    She presents today for consultation.  With regard to her cardiac history, she was previously a patient of Dr. Olivera's prior to his recent detention.  She is planning to establish care long-term with Dr. Rodney, but needed a preoperative evaluation prior to that appointment.  She states that she had recurrent DVTs, most recently in 2010, and is on long-term anticoagulation for this.  She was previously on warfarin and is now on rivaroxaban without significant bleeding complications.  Although previous records have indicated a diagnosis of atrial fibrillation, she adamantly denies ever being diagnosed with atrial fibrillation.  She does have a known history of moderate aortic stenosis for which she has had yearly echocardiograms performed by Dr. Olivera.  Her transvalvular gradients have been very stable for several years and she currently denies any limiting exertional shortness of breath or chest discomfort.  She participates  in a physical therapy program and rides a recumbent bike without limiting symptoms for approximately 20 minutes.  She states that she can walk up 2 flights of stairs without having to stop and would not be severely short of breath or have chest discomfort with this.  She does use a cane at times for ambulation, but she states this is due to back pain.  She also notes that she recently had general anesthesia for an eyelid repair last summer and had no cardiovascular complications with this.  She otherwise denies any personal history of obstructive coronary artery disease or heart failure.  Her brother was diagnosed with coronary artery disease in his 60s.  She is a non-smoker.     Medications and Allergies:     Current Outpatient Medications   Medication Sig Dispense Refill   • gabapentin (NEURONTIN) 300 MG Cap 1 tab p.o. nightly 90 Capsule 0   • hydroxychloroquine (PLAQUENIL) 200 MG Tab 1 tablet p.o. daily 90 Tablet 1   • omeprazole (PRILOSEC) 20 MG delayed-release capsule Take 1 Capsule by mouth every day. 90 Capsule 0   • lidocaine (LIDODERM) 5 % Patch APPLY ONE PATCH TO CLEAN DRY SKIN AS DIRECTED DAILY 90 Patch 1   • Calcium Carb-Cholecalciferol 500-400 MG-UNIT Tab Take 1 Tablet by mouth at bedtime.     • rivaroxaban (XARELTO) 20 MG Tab tablet Take 1 Tablet by mouth with dinner. 90 Tablet 1   • atorvastatin (LIPITOR) 10 MG Tab Take 1 tablet by mouth every evening. 90 tablet 3   • Potassium 99 MG Tab Take 99 mg by mouth every day.     • diphenhydrAMINE-APAP, sleep,  MG Tab Take 2 Tabs by mouth every bedtime.     • acetaminophen (TYLENOL) 500 MG Tab Take 1,000 mg by mouth 2 Times a Day.     • Melatonin 10 MG Tab Take 20 mg by mouth at bedtime.     • vitamin D (CHOLECALCIFEROL) 1000 UNIT Tab Take 1,000 Units by mouth every morning.     • SF 5000 PLUS 1.1 % Cream        No current facility-administered medications for this visit.       No Known Allergies     Medical Decision Making:   # Preoperative  cardiovascular evaluation: She has no known underlying obstructive coronary artery disease and her transvalvular gradients have been stable for years.  She is subjectively able to achieve more than 4 METs of activity without concerning symptoms.  She also underwent surgery last summer without cardiovascular complications.  Her RCRI risk score is 0 for a low cardiac risk surgery.  This predicts a 3.9% 30-day risk of a major perioperative event.  However, it is not clear that any additional cardiac testing or intervention would appreciably change this risk.  No further cardiac testing or intervention is recommended prior to her surgery.    She does have some features on her echocardiogram including left atrial enlargement and moderate aortic stenosis that might put her at risk for volume overload and would recommend avoiding excessive intravenous fluids during her procedure to reduce this risk.  Would also recommend continuing her statin therapy throughout the perioperative period.  Her rivaroxaban can be stopped 2-3 days prior to surgery without a need for enoxaparin bridging and should be restarted after her surgery as soon as considered safe per the surgical service.    # Moderate aortic stenosis: She appears to be relatively asymptomatic from this and her transvalvular gradients have been stable for years.  We will order a routine surveillance echocardiogram prior to her follow-up with Dr. Rodney.  -Surveillance echocardiogram    # Hyperlipidemia: She reports being on statin therapy for many years.  Her LDL is reasonably controlled.  -Continue atorvastatin 10 mg nightly    Follow Up  Patient has scheduled follow-up with Dr. Rodney     Cardiac Studies and Procedures:   Echocardiography  TTE (4/8/2021)-independently interpreted  Normal left ventricular systolic function  Estimated ejection fraction 65%  Unable to assess diastolic function due to moderate mitral annular calcification  Normal right ventricular  "size and systolic function  Unable to estimate RVSP  Severely dilated left atrium  Mild mitral regurgitation  Moderate aortic stenosis, mean gradient 21 mmHg  Normal IVC    TTE (4/17/2020)-reviewed  Normal left ventricular size and systolic function.  Left ventricular ejection fraction is visually estimated to be 65%.  Mild concentric left ventricular hypertrophy.  Grade I diastolic dysfunction.  Mildly dilated left atrium.  Moderate aortic stenosis, mean gradient 23 mmHg.  Mild mitral regurgitation.    TTE (2/28/2019)-reviewed  Normal left ventricular systolic function.  Left ventricular ejection fraction is visually estimated to be 60-65%.  Mild concentric left ventricular hypertrophy.  Grade I diastolic dysfunction.  Moderately dilated left atrium.  Moderate aortic stenosis, mean gradient 25 mmHg  Trace aortic insufficiency.  Mild mitral regurgitation.  Mild tricuspid regurgitation.  Estimated right ventricular systolic pressure  is 35 mmHg.    Electrophysiology  ECG (3/4/2022)-independently interpreted  Normal sinus rhythm, right bundle branch block, probable left atrial enlargement     Vital Signs:   /62 (BP Location: Left arm, Patient Position: Sitting)   Pulse 80   Resp 14   Ht 1.6 m (5' 3\")   Wt 72.6 kg (160 lb)   SpO2 96%    BP Readings from Last 4 Encounters:   03/04/22 120/62   02/13/22 126/84   01/11/22 136/74   12/27/21 122/70     Wt Readings from Last 4 Encounters:   03/04/22 72.6 kg (160 lb)   02/13/22 72.1 kg (159 lb)   01/11/22 73 kg (161 lb)   12/27/21 76.2 kg (168 lb)     Body mass index is 28.34 kg/m².     Laboratories:   Lipids  Lab Results   Component Value Date/Time    LDL 78 09/28/2021 0920    LDL 73 04/09/2021 0658    LDL 83 10/14/2020 0709     Lab Results   Component Value Date/Time    HDL 51 09/28/2021 0920    HDL 59 04/09/2021 0658    HDL 62 10/14/2020 0709       Lab Results   Component Value Date/Time    TRIGLYCERIDE 92 09/28/2021 0920    TRIGLYCERIDE 53 04/09/2021 0658    " TRIGLYCERIDE 85 10/14/2020 0709       Lab Results   Component Value Date/Time    CHOLSTRLTOT 147 09/28/2021 0920    CHOLSTRLTOT 143 04/09/2021 0658    CHOLSTRLTOT 162 10/14/2020 0709     GFR  Lab Results   Component Value Date/Time    IFNOTAFR >60 12/05/2021 1633    IFNOTAFR >60 09/28/2021 0920    IFNOTAFR >60 06/08/2021 1451    IFNOTAFR >60 04/09/2021 0658    IFNOTAFR >60 01/06/2021 1156     Chemistries  Lab Results   Component Value Date/Time    CREATININE 0.74 12/05/2021 1633    CREATININE 0.75 09/28/2021 0920    CREATININE 0.76 06/08/2021 1451    CREATININE 0.71 04/09/2021 0658    CREATININE 0.86 01/06/2021 1156     Lab Results   Component Value Date/Time    BUN 19 12/05/2021 1633    BUN 16 09/28/2021 0920    BUN 20 06/08/2021 1451    BUN 17 04/09/2021 0658    BUN 25 (H) 01/06/2021 1156     Lab Results   Component Value Date/Time    POTASSIUM 4.2 12/05/2021 1633    POTASSIUM 4.3 09/28/2021 0920    POTASSIUM 4.3 06/08/2021 1451     Lab Results   Component Value Date/Time    SODIUM 138 12/05/2021 1633    SODIUM 143 09/28/2021 0920    SODIUM 137 06/08/2021 1451     Lab Results   Component Value Date/Time    GLUCOSE 93 12/05/2021 1633    GLUCOSE 90 09/28/2021 0920    GLUCOSE 85 06/08/2021 1451     Lab Results   Component Value Date/Time    ASTSGOT 26 12/05/2021 1633    ASTSGOT 19 09/28/2021 0920    ASTSGOT 23 06/08/2021 1451     Lab Results   Component Value Date/Time    ALTSGPT 29 12/05/2021 1633    ALTSGPT 18 09/28/2021 0920    ALTSGPT 27 06/08/2021 1451     Lab Results   Component Value Date/Time    ALKPHOSPHAT 49 12/05/2021 1633    ALKPHOSPHAT 54 09/28/2021 0920    ALKPHOSPHAT 46 06/08/2021 1451     Lab Results   Component Value Date/Time    HBA1C 5.5 05/16/2016 0750     TSH   Date Value Ref Range Status   07/27/2020 3.190 0.380 - 5.330 uIU/mL Final     Comment:     Please note new reference ranges effective 12/14/2017 10:00 AM  Pregnant Females, 1st Trimester  0.050-3.700  Pregnant Females, 2nd Trimester   0.310-4.350  Pregnant Females, 3rd Trimester  0.410-5.180       Blood Counts  Lab Results   Component Value Date/Time    HEMOGLOBIN 14.2 12/05/2021 1633    HEMOGLOBIN 15.0 04/09/2021 0658    HEMOGLOBIN 14.9 10/14/2020 0709     Lab Results   Component Value Date/Time    PLATELETCT 181 12/05/2021 1633    PLATELETCT 141 (L) 04/09/2021 0658    PLATELETCT 177 10/14/2020 0709     Lab Results   Component Value Date/Time    WBC 6.1 12/05/2021 1633    WBC 4.5 (L) 04/09/2021 0658    WBC 5.6 10/14/2020 0709      Physical Examination:   General: Well-appearing, no acute distress  Eyes: Extraocular movements intact, anicteric  Neck: Supple, full range of motion, no jugular venous distension  Pulmonary: Normal respiratory effort, clear to auscultation bilaterally  Cardiovascular: Regular rate and rhythm, 3/6 harsh systolic murmur  Gastrointestinal: Soft, non-tender, non-distended  Extremities: Warm and well perfused, very trace extremity edema  Neurological: Alert and oriented, no gross focal motor deficits     Past History:   Past Medical History  The patient's past medical history was reviewed.  See HPI and self-reported patient medical history form for pertinent medical history to consultation.    Past Social History  The patient's social history was reviewed.  See hospitals self-reported patient medical history form for pertinent social history to consultation.    Past Family History  The patient's family history was reviewed.  See hospitals self-reported patient medical history form for pertinent family history to consultation.    Review of Systems  A pertinent cardiac review of systems was performed and was otherwise unremarkable except as per HPI and self-reported patient medical history form.        Darron Van MD, [facility]  Interventional Cardiology  [facility] Heart and Vascular [facility], Bldg B  [address] [address]  Phone: [phone]  Fax: [phone]                   Bernardino Torres M.D.  [address] [place] [address]  Via Fax: [phone]

## 2022-03-04 NOTE — PROGRESS NOTES
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 Briefly, she is undergoing evaluation for malignant melanoma on her left chest. She was referred to cardiology for further evaluation given her history of moderate aortic stenosis.    She presents today for consultation.  With regard to her cardiac history, she was previously a patient of Dr. Olivera's prior to his recent penitentiary.  She is planning to establish care long-term with Dr. Rodney, but needed a preoperative evaluation prior to that appointment.  She states that she had recurrent DVTs, most recently in 2010, and is on long-term anticoagulation for this.  She was previously on warfarin and is now on rivaroxaban without significant bleeding complications.  Although previous records have indicated a diagnosis of atrial fibrillation, she adamantly denies ever being diagnosed with atrial fibrillation.  She does have a known history of moderate aortic stenosis for which she has had yearly echocardiograms performed by Dr. Olivera.  Her transvalvular gradients have been very stable for several years and she currently denies any limiting exertional shortness of breath or chest discomfort.  She participates in a physical therapy program and rides a recumbent bike without limiting symptoms for approximately 20 minutes.  She states that she can walk up 2 flights of stairs without having to stop and would not be severely short of breath or have chest discomfort with this.  She  does use a cane at times for ambulation, but she states this is due to back pain.  She also notes that she recently had general anesthesia for an eyelid repair last summer and had no cardiovascular complications with this.  She otherwise denies any personal history of obstructive coronary artery disease or heart failure.  Her brother was diagnosed with coronary artery disease in his 60s.  She is a non-smoker.     Medications and Allergies:     Current Outpatient Medications   Medication Sig Dispense Refill   • gabapentin (NEURONTIN) 300 MG Cap 1 tab p.o. nightly 90 Capsule 0   • hydroxychloroquine (PLAQUENIL) 200 MG Tab 1 tablet p.o. daily 90 Tablet 1   • omeprazole (PRILOSEC) 20 MG delayed-release capsule Take 1 Capsule by mouth every day. 90 Capsule 0   • lidocaine (LIDODERM) 5 % Patch APPLY ONE PATCH TO CLEAN DRY SKIN AS DIRECTED DAILY 90 Patch 1   • Calcium Carb-Cholecalciferol 500-400 MG-UNIT Tab Take 1 Tablet by mouth at bedtime.     • rivaroxaban (XARELTO) 20 MG Tab tablet Take 1 Tablet by mouth with dinner. 90 Tablet 1   • atorvastatin (LIPITOR) 10 MG Tab Take 1 tablet by mouth every evening. 90 tablet 3   • Potassium 99 MG Tab Take 99 mg by mouth every day.     • diphenhydrAMINE-APAP, sleep,  MG Tab Take 2 Tabs by mouth every bedtime.     • acetaminophen (TYLENOL) 500 MG Tab Take 1,000 mg by mouth 2 Times a Day.     • Melatonin 10 MG Tab Take 20 mg by mouth at bedtime.     • vitamin D (CHOLECALCIFEROL) 1000 UNIT Tab Take 1,000 Units by mouth every morning.     • SF 5000 PLUS 1.1 % Cream        No current facility-administered medications for this visit.       No Known Allergies     Medical Decision Making:   # Preoperative cardiovascular evaluation: She has no known underlying obstructive coronary artery disease and her transvalvular gradients have been stable for years.  She is subjectively able to achieve more than 4 METs of activity without concerning symptoms.  She also underwent surgery last  summer without cardiovascular complications.  Her RCRI risk score is 0 for a low cardiac risk surgery.  This predicts a 3.9% 30-day risk of a major perioperative event.  However, it is not clear that any additional cardiac testing or intervention would appreciably change this risk.  No further cardiac testing or intervention is recommended prior to her surgery.    She does have some features on her echocardiogram including left atrial enlargement and moderate aortic stenosis that might put her at risk for volume overload and would recommend avoiding excessive intravenous fluids during her procedure to reduce this risk.  Would also recommend continuing her statin therapy throughout the perioperative period.  Her rivaroxaban can be stopped 2-3 days prior to surgery without a need for enoxaparin bridging and should be restarted after her surgery as soon as considered safe per the surgical service.    # Moderate aortic stenosis: She appears to be relatively asymptomatic from this and her transvalvular gradients have been stable for years.  We will order a routine surveillance echocardiogram prior to her follow-up with Dr. Rodney.  -Surveillance echocardiogram    # Hyperlipidemia: She reports being on statin therapy for many years.  Her LDL is reasonably controlled.  -Continue atorvastatin 10 mg nightly    Follow Up  Patient has scheduled follow-up with Dr. Rodney     Cardiac Studies and Procedures:   Echocardiography  TTE (4/8/2021)-independently interpreted  Normal left ventricular systolic function  Estimated ejection fraction 65%  Unable to assess diastolic function due to moderate mitral annular calcification  Normal right ventricular size and systolic function  Unable to estimate RVSP  Severely dilated left atrium  Mild mitral regurgitation  Moderate aortic stenosis, mean gradient 21 mmHg  Normal IVC    TTE (4/17/2020)-reviewed  Normal left ventricular size and systolic function.  Left ventricular ejection  "fraction is visually estimated to be 65%.  Mild concentric left ventricular hypertrophy.  Grade I diastolic dysfunction.  Mildly dilated left atrium.  Moderate aortic stenosis, mean gradient 23 mmHg.  Mild mitral regurgitation.    TTE (2/28/2019)-reviewed  Normal left ventricular systolic function.  Left ventricular ejection fraction is visually estimated to be 60-65%.  Mild concentric left ventricular hypertrophy.  Grade I diastolic dysfunction.  Moderately dilated left atrium.  Moderate aortic stenosis, mean gradient 25 mmHg  Trace aortic insufficiency.  Mild mitral regurgitation.  Mild tricuspid regurgitation.  Estimated right ventricular systolic pressure  is 35 mmHg.    Electrophysiology  ECG (3/4/2022)-independently interpreted  Normal sinus rhythm, right bundle branch block, probable left atrial enlargement     Vital Signs:   /62 (BP Location: Left arm, Patient Position: Sitting)   Pulse 80   Resp 14   Ht 1.6 m (5' 3\")   Wt 72.6 kg (160 lb)   SpO2 96%    BP Readings from Last 4 Encounters:   03/04/22 120/62   02/13/22 126/84   01/11/22 136/74   12/27/21 122/70     Wt Readings from Last 4 Encounters:   03/04/22 72.6 kg (160 lb)   02/13/22 72.1 kg (159 lb)   01/11/22 73 kg (161 lb)   12/27/21 76.2 kg (168 lb)     Body mass index is 28.34 kg/m².     Laboratories:   Lipids  Lab Results   Component Value Date/Time    LDL 78 09/28/2021 0920    LDL 73 04/09/2021 0658    LDL 83 10/14/2020 0709     Lab Results   Component Value Date/Time    HDL 51 09/28/2021 0920    HDL 59 04/09/2021 0658    HDL 62 10/14/2020 0709       Lab Results   Component Value Date/Time    TRIGLYCERIDE 92 09/28/2021 0920    TRIGLYCERIDE 53 04/09/2021 0658    TRIGLYCERIDE 85 10/14/2020 0709       Lab Results   Component Value Date/Time    CHOLSTRLTOT 147 09/28/2021 0920    CHOLSTRLTOT 143 04/09/2021 0658    CHOLSTRLTOT 162 10/14/2020 0709     GFR  Lab Results   Component Value Date/Time    IFNOTAFR >60 12/05/2021 1633    IFNOTAFR >60 " 09/28/2021 0920    IFNOTAFR >60 06/08/2021 1451    IFNOTAFR >60 04/09/2021 0658    IFNOTAFR >60 01/06/2021 1156     Chemistries  Lab Results   Component Value Date/Time    CREATININE 0.74 12/05/2021 1633    CREATININE 0.75 09/28/2021 0920    CREATININE 0.76 06/08/2021 1451    CREATININE 0.71 04/09/2021 0658    CREATININE 0.86 01/06/2021 1156     Lab Results   Component Value Date/Time    BUN 19 12/05/2021 1633    BUN 16 09/28/2021 0920    BUN 20 06/08/2021 1451    BUN 17 04/09/2021 0658    BUN 25 (H) 01/06/2021 1156     Lab Results   Component Value Date/Time    POTASSIUM 4.2 12/05/2021 1633    POTASSIUM 4.3 09/28/2021 0920    POTASSIUM 4.3 06/08/2021 1451     Lab Results   Component Value Date/Time    SODIUM 138 12/05/2021 1633    SODIUM 143 09/28/2021 0920    SODIUM 137 06/08/2021 1451     Lab Results   Component Value Date/Time    GLUCOSE 93 12/05/2021 1633    GLUCOSE 90 09/28/2021 0920    GLUCOSE 85 06/08/2021 1451     Lab Results   Component Value Date/Time    ASTSGOT 26 12/05/2021 1633    ASTSGOT 19 09/28/2021 0920    ASTSGOT 23 06/08/2021 1451     Lab Results   Component Value Date/Time    ALTSGPT 29 12/05/2021 1633    ALTSGPT 18 09/28/2021 0920    ALTSGPT 27 06/08/2021 1451     Lab Results   Component Value Date/Time    ALKPHOSPHAT 49 12/05/2021 1633    ALKPHOSPHAT 54 09/28/2021 0920    ALKPHOSPHAT 46 06/08/2021 1451     Lab Results   Component Value Date/Time    HBA1C 5.5 05/16/2016 0750     TSH   Date Value Ref Range Status   07/27/2020 3.190 0.380 - 5.330 uIU/mL Final     Comment:     Please note new reference ranges effective 12/14/2017 10:00 AM  Pregnant Females, 1st Trimester  0.050-3.700  Pregnant Females, 2nd Trimester  0.310-4.350  Pregnant Females, 3rd Trimester  0.410-5.180       Blood Counts  Lab Results   Component Value Date/Time    HEMOGLOBIN 14.2 12/05/2021 1633    HEMOGLOBIN 15.0 04/09/2021 0658    HEMOGLOBIN 14.9 10/14/2020 0709     Lab Results   Component Value Date/Time    PLATELETCT 181  12/05/2021 1633    PLATELETCT 141 (L) 04/09/2021 0658    PLATELETCT 177 10/14/2020 0709     Lab Results   Component Value Date/Time    WBC 6.1 12/05/2021 1633    WBC 4.5 (L) 04/09/2021 0658    WBC 5.6 10/14/2020 0709      Physical Examination:   General: Well-appearing, no acute distress  Eyes: Extraocular movements intact, anicteric  Neck: Supple, full range of motion, no jugular venous distension  Pulmonary: Normal respiratory effort, clear to auscultation bilaterally  Cardiovascular: Regular rate and rhythm, 3/6 harsh systolic murmur  Gastrointestinal: Soft, non-tender, non-distended  Extremities: Warm and well perfused, very trace extremity edema  Neurological: Alert and oriented, no gross focal motor deficits     Past History:   Past Medical History  The patient's past medical history was reviewed.  See HPI and self-reported patient medical history form for pertinent medical history to consultation.    Past Social History  The patient's social history was reviewed.  See HPI self-reported patient medical history form for pertinent social history to consultation.    Past Family History  The patient's family history was reviewed.  See HPI self-reported patient medical history form for pertinent family history to consultation.    Review of Systems  A pertinent cardiac review of systems was performed and was otherwise unremarkable except as per HPI and self-reported patient medical history form.        Darron Van MD, [facility]  Interventional Cardiology  [facility] Heart and Vascular [facility], Bldg B  [address] [address]  Phone: [phone]  Fax: [phone]

## 2022-03-07 ENCOUNTER — DOCUMENTATION (OUTPATIENT)
Dept: MEDICAL GROUP | Facility: PHYSICIAN GROUP | Age: 87
End: 2022-03-07
Payer: MEDICARE

## 2022-03-07 DIAGNOSIS — M54.40 CHRONIC MIDLINE LOW BACK PAIN WITH SCIATICA, SCIATICA LATERALITY UNSPECIFIED: ICD-10-CM

## 2022-03-07 DIAGNOSIS — G89.29 CHRONIC MIDLINE LOW BACK PAIN WITH SCIATICA, SCIATICA LATERALITY UNSPECIFIED: ICD-10-CM

## 2022-03-07 NOTE — PROGRESS NOTES
Patient scheduled for dermatologic procedure on 3-29-22 and will need to hold Xarelto in anticipation of procedure.  S/w patient to relay instructions below, she voices understanding.  Copy of this note forwarded to Dr Bernardino Torres @ [facility] [phone].    Steve Hahn, PharmD, BCACP      Per the 2017 ACC Expert Consensus Decision Pathway for Periprocedural Management of Anticoagulation (see document below) - it would be recommended to have pt hold Xarelto 48 hours prior to procedure and restart at discretion of operating physician.

## 2022-03-09 DIAGNOSIS — Z79.01 CHRONIC ANTICOAGULATION: ICD-10-CM

## 2022-03-10 LAB — EKG IMPRESSION: NORMAL

## 2022-03-10 PROCEDURE — 93000 ELECTROCARDIOGRAM COMPLETE: CPT | Performed by: INTERNAL MEDICINE

## 2022-03-17 ENCOUNTER — OFFICE VISIT (OUTPATIENT)
Dept: PHYSICAL MEDICINE AND REHAB | Facility: MEDICAL CENTER | Age: 87
End: 2022-03-17
Payer: MEDICARE

## 2022-03-17 VITALS
OXYGEN SATURATION: 95 % | BODY MASS INDEX: 28.63 KG/M2 | SYSTOLIC BLOOD PRESSURE: 136 MMHG | TEMPERATURE: 98 F | WEIGHT: 161.6 LBS | DIASTOLIC BLOOD PRESSURE: 88 MMHG | HEIGHT: 63 IN | HEART RATE: 76 BPM

## 2022-03-17 DIAGNOSIS — G62.9 NEUROPATHY: ICD-10-CM

## 2022-03-17 DIAGNOSIS — R20.0 NUMBNESS AND TINGLING OF RIGHT LEG: ICD-10-CM

## 2022-03-17 DIAGNOSIS — M54.16 LUMBAR RADICULITIS: ICD-10-CM

## 2022-03-17 DIAGNOSIS — M47.816 LUMBAR SPONDYLOSIS: ICD-10-CM

## 2022-03-17 DIAGNOSIS — M54.41 CHRONIC BILATERAL LOW BACK PAIN WITH RIGHT-SIDED SCIATICA: Primary | ICD-10-CM

## 2022-03-17 DIAGNOSIS — M05.79 RHEUMATOID ARTHRITIS INVOLVING MULTIPLE SITES WITH POSITIVE RHEUMATOID FACTOR (HCC): Chronic | ICD-10-CM

## 2022-03-17 DIAGNOSIS — R20.2 NUMBNESS AND TINGLING OF RIGHT LEG: ICD-10-CM

## 2022-03-17 DIAGNOSIS — G89.29 CHRONIC BILATERAL LOW BACK PAIN WITH RIGHT-SIDED SCIATICA: Primary | ICD-10-CM

## 2022-03-17 DIAGNOSIS — M48.061 SPINAL STENOSIS OF LUMBAR REGION, UNSPECIFIED WHETHER NEUROGENIC CLAUDICATION PRESENT: ICD-10-CM

## 2022-03-17 PROCEDURE — 99204 OFFICE O/P NEW MOD 45 MIN: CPT | Performed by: STUDENT IN AN ORGANIZED HEALTH CARE EDUCATION/TRAINING PROGRAM

## 2022-03-17 RX ORDER — CHLORAL HYDRATE 500 MG
1200 CAPSULE ORAL DAILY
COMMUNITY
End: 2022-04-26

## 2022-03-17 ASSESSMENT — PATIENT HEALTH QUESTIONNAIRE - PHQ9
CLINICAL INTERPRETATION OF PHQ2 SCORE: 0
5. POOR APPETITE OR OVEREATING: 0 - NOT AT ALL

## 2022-03-17 ASSESSMENT — FIBROSIS 4 INDEX: FIB4 SCORE: 2.27

## 2022-03-17 ASSESSMENT — PAIN SCALES - GENERAL: PAINLEVEL: 7=MODERATE-SEVERE PAIN

## 2022-03-17 NOTE — PROGRESS NOTES
New Patient Note    Interventional Pain and Spine  Physiatry (Physical Medicine and Rehabilitation)     Patient Name: Marry Mathur  : 1935  Date of Service: 3/17/2022  PCP: KINSEY Horner.  Referring Provider: Deanna Escoto M*    Chief Complaint:   Chief Complaint   Patient presents with   • New Patient     Chronic midline low back pain with sciatica, sciatica laterality unspecified       HISTORY    HPI:  Marry Mathur is a 86 y.o. female who presents today with constant low back pain and burning lateral right leg pain that started a couple of years ago with no known inciting event. States she has seen Dr. Cooper for multiple years, most recently 4 months ago, but feels that her pain continues to worsen so she seeks to reestablish care with a new pain provider.    Her pain at its best-worse level during the course of the day is 4-9/10, respectively. Pain right now is 7/10 on the numeric pain scale. Pain worsens with standing and walking and improves slightly with rest and sitting. Her pain interferes somewhat with ADLs. Pain interferes with her ability to work in her yard.  The patient denies new weakness, numbness, or bladder/bowel incontinence. Denies pain in her left leg. Endorses history of neuropathy in feet with no known inciting event. Had an EMG done with Dr. Booth which revealed a left peroneal axonal mononeuropathy per chart. Has hx of RA, feels that her RA symptoms are stable.    Notes that she is able to ambulate more easily by bending forward and using a shopping cart. Notes that propping her legs in a recliner improves her pain.    On xarelto. Seeing Dr. Rodney. Pt states she has been able to stop xarelto for procedures in the past.     The patient is currently going to physical therapy for this problem, most recently last week. Endorses improvement with dry needling lasting 12-24h.    Patient has tried the following medications with varied  "success (current meds in bold):   Gabapentin 300mg QHS  Tylenol 500mg BID    Also taking fish oil    Therapeutic modalities and interventional therapies to date include:  -multiple per chart. Pt states the most helpful were bilateral 2-level facet joint injections with helped for a few months  - L4-L5 epidural  - pt notes she has had multiple other injections that are not viewable in her [facility] chart    Medical history includes osteoporosis, protein S deficiency, left TKA, left CARLYLE, RA, HLD, HTN, GERD, DVT    Denies hx of back surgery.    Psychological testing for pain as depression and pain commonly coexist and need to both be treated.     Opioid Risk Score: 0    Interpretation of Opioid Risk Score   Score 0-3 = Low risk of abuse. Do UDS at least once per year.  Score 4-7 = Moderate risk of abuse. Do UDS 1-4 times per year.  Score 8+ = High risk of abuse. Refer to specialist.    PHQ  Depression Screen (PHQ-2/PHQ-9) 8/13/2021 1/11/2022 3/17/2022   PHQ-2 Total Score - - -   PHQ-2 Total Score - - -   PHQ-2 Total Score 0 0 0       Interpretation of PHQ-9 Total Score   Score Severity   1-4 No Depression   5-9 Mild Depression   10-14 Moderate Depression   15-19 Moderately Severe Depression   20-27 Severe Depression      Medical records review:  I reviewed the note from the referring provider Deanna Escoto M* including the note dated 12/27/21.    ROS:   Red Flags ROS:  Fever, Chills, Sweats: Denies  Involuntary Weight Loss: Denies  Bladder Incontinence: yes, chronic. Manages with pads  Bowel Incontinence: denies  Saddle Anesthesia: Denies    All other systems reviewed and negative.     PMHx:   Past Medical History:   Diagnosis Date   • Adenocarcinoma of colon (HCC) 10/30/2018    From sessile serrated polyp 10/2018   • Anesthesia     pt states \"one new dr scraped my esophagus when they put the tube in and I started bleeding so they couldn't operate\"    • Atrial fibrillation [I48.91] 4/19/2016     pt denies    • " Back pain 6/27/2012   • Blood clotting disorder (HCC) 2012    clot in leg   • Bowel habit changes     constipation    • Cancer (HCC)     skin, colon 2018   • Cataract     belia IOL    • Chronic back pain greater than 3 months duration    • Deep vein thrombosis (HCC) 3/8/2016    First occurrence in LLE in late 1970s Second occurrence further up in LLE in 2012, has been on AC since    • Essential hypertension, benign 6/27/2012   • GERD (gastroesophageal reflux disease) 6/27/2012   • Heart murmur    • Hyperlipidemia    • Hypertension     hx of, not currently    • Impaired fasting glucose 11/2/2017   • Mild aortic stenosis 11/2/2017   • Other and unspecified hyperlipidemia 6/27/2012   • Prediabetes    • Protein S deficiency (HCC) 11/2/2017    Noted in lab work 2013 as a part of work up at [facility]    • Rheumatoid arthritis involving multiple sites with positive rheumatoid factor ([facility]) 03/14/2016    Dr. Escoto   • Rheumatoid nodule (HCC) 7/25/2017   • Right bundle branch block 6/27/2012    pt denies    • Urinary incontinence 11/2/2017       PSHx:   Past Surgical History:   Procedure Laterality Date   • LUMBAR MEDIAL BRANCH BLOCKS Bilateral 12/15/2020    Procedure: BLOCK, NERVE, SPINAL, LUMBAR, POSTERIOR RAMUS, MEDIAL BRANCH;  Surgeon: Chris Cooper M.D.;  Location: [facility];  Service: Pain Management   • IRRIGATION & DEBRIDEMENT ORTHO Bilateral 7/31/2020    Procedure: IRRIGATION AND DEBRIDEMENT, WOUND - KNEE;  Surgeon: Roosevelt Saucedo M.D.;  Location: SURGERY [facility];  Service: Orthopedics   • HEMICOLECTOMY Right 12/13/2018    Procedure: OPEN RIGHT HEMICOLECTOMY;  Surgeon: Dash Bhagat M.D.;  Location: SURGERY [facility];  Service: General   • INGUINAL HERNIA REPAIR Right 9/23/2016    Procedure: INGUINAL HERNIA REPAIR - PRIMARY;  Surgeon: Mary Medina M.D.;  Location: SURGERY [facility];  Service:    • OTHER  08/12/2014    peroneal nerve surgery Dr. Castro    •  OTHER ORTHOPEDIC SURGERY  2014    left knee partial   • OTHER ORTHOPEDIC SURGERY  2011    meniscus repair   • ARTHROPLASTY      partial TKA with Dr. Persaud   • CHOLECYSTECTOMY     • HYSTERECTOMY, TOTAL ABDOMINAL  1970's   • ROTATOR CUFF REPAIR Bilateral        Family Hx:   Family History   Problem Relation Age of Onset   • Cancer Mother         stomach   • Cancer Father         colon   • Leukemia Father         many exposure, worked for [employer]    • Heart Disease Brother         s/p stent        Social Hx:  Social History     Socioeconomic History   • Marital status:      Spouse name: Not on file   • Number of children: Not on file   • Years of education: Not on file   • Highest education level: Not on file   Occupational History   • Not on file   Tobacco Use   • Smoking status: Never Smoker   • Smokeless tobacco: Never Used   Vaping Use   • Vaping Use: Never used   Substance and Sexual Activity   • Alcohol use: Yes     Alcohol/week: 0.0 oz     Comment: very rare   • Drug use: No   • Sexual activity: Not Currently     Partners: Male     Comment:     Other Topics Concern   • Not on file   Social History Narrative    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   Outpatient Medications Marked as Taking for the 3/17/22 encounter (Office Visit) with Indira Lee M.D.   Medication Sig Dispense Refill   • Magnesium 400 MG Cap      • B Complex Vitamins (B COMPLEX B-12 PO) Take  by mouth.     • Omega-3 Fatty Acids (FISH OIL) 1000 MG Cap capsule Take 1,000 mg by mouth 3 times a day with meals.     •  gabapentin (NEURONTIN) 300 MG Cap 1 tab p.o. nightly 90 Capsule 0   • hydroxychloroquine (PLAQUENIL) 200 MG Tab 1 tablet p.o. daily 90 Tablet 1   • omeprazole (PRILOSEC) 20 MG delayed-release capsule Take 1 Capsule by mouth every day. 90 Capsule 0   • SF 5000 PLUS 1.1 % Cream      • lidocaine (LIDODERM) 5 % Patch APPLY ONE PATCH TO CLEAN DRY SKIN AS DIRECTED DAILY 90 Patch 1   • Calcium Carb-Cholecalciferol 500-400 MG-UNIT Tab Take 1 Tablet by mouth at bedtime.     • rivaroxaban (XARELTO) 20 MG Tab tablet Take 1 Tablet by mouth with dinner. 90 Tablet 1   • atorvastatin (LIPITOR) 10 MG Tab Take 1 tablet by mouth every evening. 90 tablet 3   • Potassium 99 MG Tab Take 99 mg by mouth every day.     • acetaminophen (TYLENOL) 500 MG Tab Take 1,000 mg by mouth 2 Times a Day.     • Melatonin 10 MG Tab Take 20 mg by mouth at bedtime.     • vitamin D (CHOLECALCIFEROL) 1000 UNIT Tab Take 1,000 Units by mouth every morning.          Current Outpatient Medications on File Prior to Visit   Medication Sig Dispense Refill   • Magnesium 400 MG Cap      • B Complex Vitamins (B COMPLEX B-12 PO) Take  by mouth.     • Omega-3 Fatty Acids (FISH OIL) 1000 MG Cap capsule Take 1,000 mg by mouth 3 times a day with meals.     • gabapentin (NEURONTIN) 300 MG Cap 1 tab p.o. nightly 90 Capsule 0   • hydroxychloroquine (PLAQUENIL) 200 MG Tab 1 tablet p.o. daily 90 Tablet 1   • omeprazole (PRILOSEC) 20 MG delayed-release capsule Take 1 Capsule by mouth every day. 90 Capsule 0   • SF 5000 PLUS 1.1 % Cream      • lidocaine (LIDODERM) 5 % Patch APPLY ONE PATCH TO CLEAN DRY SKIN AS DIRECTED DAILY 90 Patch 1   • Calcium Carb-Cholecalciferol 500-400 MG-UNIT Tab Take 1 Tablet by mouth at bedtime.     • rivaroxaban (XARELTO) 20 MG Tab tablet Take 1 Tablet by mouth with dinner. 90 Tablet 1   • atorvastatin (LIPITOR) 10 MG Tab Take 1 tablet by mouth every evening. 90 tablet 3   • Potassium 99 MG Tab Take 99 mg by mouth every day.     • acetaminophen  "(TYLENOL) 500 MG Tab Take 1,000 mg by mouth 2 Times a Day.     • Melatonin 10 MG Tab Take 20 mg by mouth at bedtime.     • vitamin D (CHOLECALCIFEROL) 1000 UNIT Tab Take 1,000 Units by mouth every morning.     • diphenhydrAMINE-APAP, sleep,  MG Tab Take 2 Tabs by mouth every bedtime. (Patient not taking: Reported on 3/17/2022)       No current facility-administered medications on file prior to visit.         EXAMINATION     Physical Exam:   /88 (BP Location: Right arm, Patient Position: Sitting, BP Cuff Size: Adult)   Pulse 76   Temp 36.7 °C (98 °F) (Temporal)   Ht 1.6 m (5' 3\")   Wt 73.3 kg (161 lb 9.6 oz)   SpO2 95%     Constitutional:   Body Habitus: Body mass index is 28.63 kg/m².  Cooperation: Fully cooperates with exam  Appearance: Well-groomed, well-nourished.    Eyes: No scleral icterus to suggest severe liver disease, no proptosis to suggest severe hyperthyroidism    ENT -no obvious auditory deficits, no noticeable facial droop     Skin -no rashes or lesions noted     Respiratory-  breathing comfortably on room air, no audible wheezing    Cardiovascular-distal extremities warm and well perfused.  No lower extremity edema is noted.     Gastrointestinal - no obvious abdominal masses, non-distended     Psychiatric- alert and oriented ×3. Normal affect.     Gait - antalgic gait. Uses cane for balance.    Musculoskeletal and Neuro -     Thoracic/Lumbar Spine/Sacral Spine/Hips   Inspection: No evidence of atrophy in bilateral lower extremities throughout     ROM: limited active range of motion with lumbar flexion, lateral flexion, and rotation bilaterally.   There is limited active range of motion with lumbar extension    Facet loading maneuver positive bilaterally    Palpation:   Tenderness to palpation over the midline of lumbosacral spine, paraspinal muscles bilaterally and lumbar facets bilaterally. No tenderness to palpation elsewhere in the low back/hips including sacroiliac joints " bilaterally, PSIS bilaterally and greater trochanters bilaterally.    Lumbar spine /hip provocative exam maneuvers  Straight leg raise negative bilaterally  Slump-sit test negative bilaterally  FADIR test negative bilaterally    SI joint tests  JENNIFER test negative bilaterally  Thigh thrust test negative bilaterally  SI joint compression negative bilaterally  SI joint distraction negative bilaterally  Sacral thrust test negative bilaterally  Gaenslens maneuver negative bilaterally    Key points for the international standards for neurological classification of spinal cord injury (ISNCSCI) to light touch.   Dermatome R L   L2 2 2   L3 2 2   L4 2 2   L5 1 2   S1 1 1   S2 2 2       Motor Exam Lower Extremities  ? Myotome R L   Hip flexion L2 5 5   Knee extension L3 5 5   Ankle dorsiflexion L4 5 5   Toe extension L5 5 5   Ankle plantarflexion S1 5 5   Bilateral hip abductor 4-/5    Reflexes  ?  R L   Patella  2+ 2+   Achilles   2+ 2+     Clonus of the ankle negative bilaterally       MEDICAL DECISION MAKING    Medical records review: see under HPI section.     DATA    Labs: Personally reviewed at today's visit    Lab Results   Component Value Date/Time    SODIUM 138 12/05/2021 04:33 PM    POTASSIUM 4.2 12/05/2021 04:33 PM    CHLORIDE 103 12/05/2021 04:33 PM    CO2 24 12/05/2021 04:33 PM    ANION 11.0 12/05/2021 04:33 PM    GLUCOSE 93 12/05/2021 04:33 PM    BUN 19 12/05/2021 04:33 PM    CREATININE 0.74 12/05/2021 04:33 PM    CALCIUM 9.0 12/05/2021 04:33 PM    ASTSGOT 26 12/05/2021 04:33 PM    ALTSGPT 29 12/05/2021 04:33 PM    TBILIRUBIN 0.5 12/05/2021 04:33 PM    ALBUMIN 3.9 12/05/2021 04:33 PM    TOTPROTEIN 6.6 12/05/2021 04:33 PM    GLOBULIN 2.7 12/05/2021 04:33 PM    AGRATIO 1.4 12/05/2021 04:33 PM       Lab Results   Component Value Date/Time    PROTHROMBTM 16.8 (H) 12/05/2021 04:33 PM    INR 1.40 (H) 12/05/2021 04:33 PM        Lab Results   Component Value Date/Time    WBC 6.1 12/05/2021 04:33 PM    RBC 4.46  12/05/2021 04:33 PM    HEMOGLOBIN 14.2 12/05/2021 04:33 PM    HEMATOCRIT 42.0 12/05/2021 04:33 PM    MCV 94.2 12/05/2021 04:33 PM    MCH 31.8 12/05/2021 04:33 PM    MCHC 33.8 12/05/2021 04:33 PM    MPV 11.3 12/05/2021 04:33 PM    NEUTSPOLYS 65.70 12/05/2021 04:33 PM    LYMPHOCYTES 18.10 (L) 12/05/2021 04:33 PM    MONOCYTES 9.30 12/05/2021 04:33 PM    EOSINOPHILS 5.70 12/05/2021 04:33 PM    BASOPHILS 1.00 12/05/2021 04:33 PM        Lab Results   Component Value Date/Time    HBA1C 5.5 05/16/2016 07:50 AM        Imaging:   I personally reviewed following images, these are my reads  MRI lumbar spine 5/26/20  Severe bilateral facet arthropathy at L4-L5. Moderate-severe facet arthropathy at bilateral L3-L4. Grade 1 anterolisthesis of L4 on L5. Mod-severe right neuroforaminal stenosis at L3-L4, mild left neuroforaminal stenosis at L3-L4. Moderate central stenosis at L3-4.        IMAGING radiology reads. I reviewed the following radiology reads                      Results for orders placed during the hospital encounter of 05/26/20    MR-LUMBAR SPINE-W/O    Impression  Interval worsening L3/4 central protrusion results in worsening moderate central stenosis    Otherwise stable multilevel degenerative change resulting in foraminal predominate stenoses, greatest is moderate to severe on the right at L3/4.    Lesser stenoses at other levels as detailed above    Stable L4/5 grade 1 anterolisthesis of secondary to severe facet arthropathy. Stable minimal degenerative retrolisthesis of the upper lumbar levels    Mature L5/S1 interbody fusion      Results for orders placed during the hospital encounter of 11/01/16    DX-LUMBAR SPINE-2 OR 3 VIEWS    Impression  1.  There has been progression of mild diffuse degenerative disc disease and facet disease throughout the entire lumbar spine.  2.  There are degenerative areas of anterolisthesis and retrolisthesis as noted above.     Results for orders placed during the hospital encounter  of 20    DX-LUMBAR SPINE-4+ VIEWS    Impression  1.  No compression deformity or acute fracture is identified.    2.  There is grade 2 anterolisthesis now identified at L4-L5 which is significantly increased compared to the prior exam. There appears to be L4 spondylolysis.    3.  Some interval increase in degenerative disc disease and facet arthropathy.    4.  No focal instability noted on flexion extension views.               Diagnosis  Visit Diagnoses     ICD-10-CM   1. Chronic bilateral low back pain with right-sided sciatica  M54.41    G89.29   2. Spinal stenosis of lumbar region, unspecified whether neurogenic claudication present  M48.061   3. Lumbar spondylosis  M47.816   4. Rheumatoid arthritis involving multiple sites with positive rheumatoid factor (HCC)  M05.79   5. Neuropathy  G62.9   6. Numbness and tingling of right leg  R20.0    R20.2   7. Lumbar radiculitis  M54.16         ASSESSMENT AND PLAN:  Marry Tubbs [facility]  1935 is a female with history of osteoporosis, protein S deficiency, left TKA, left CARLYLE, RA, HLD, HTN, GERD, DVT on chronic Xarelto, and neuropathy in bilateral feet who presents with progressively worsening bilateral low back pain with pos bilateral lumbar facet loading maneuver suggestive of lumbar facetogenic pain, and progressively worsening pain radiating down her right leg suggestive of right lumbar radiculitis. She endorses worsening pain with lumbar extension improved with lumbar flexion. Denies left leg symptoms. Lumbar spinal stenosis with neurogenic claudication is in the differential although I feel it is less likely to be the main etiology of her symptoms at this time.       Marry was seen today for new patient.    Diagnoses and all orders for this visit:    Chronic bilateral low back pain with right-sided sciatica  -     MR-LUMBAR SPINE-W/O; Future    Spinal stenosis of lumbar region, unspecified whether neurogenic claudication present    Lumbar  spondylosis    Rheumatoid arthritis involving multiple sites with positive rheumatoid factor (HCC)    Neuropathy    Numbness and tingling of right leg    Lumbar radiculitis          PLAN  Physical Therapy: continue PT. On exam today she exhibited weakness of her hip abductors.  Discussed that this is an important stabilizer of the lumbar spine and hips, and strengthening this muscle with physical therapy should improve her pain.    Diagnostic workup: order updated MRI given worsening pain, ddx including lumbar spinal stenosis, and possible interventional treatment in the future.  Personally reviewed at today's visit and discussed with patient: MRI lumbar spine 5/26/20    Medications:  - continue Gabapentin 300mg QHS  - continue Tylenol 500mg BID  - NSAIDs contraindicated due to being on chronic anticoagulation for protein S deficiency and hx of DVT    Interventions: pending MRI review. Would likely need to hold Xarelto for an interventional procedure.    Outside records requested:  The patient signed outside records request form for her outside records including outside images. This includes the records from [facility]    Follow-up: for MRI review    Orders Placed This Encounter   • MR-LUMBAR SPINE-W/O   • Magnesium 400 MG Cap   • B Complex Vitamins (B COMPLEX B-12 PO)   • Omega-3 Fatty Acids (FISH OIL) 1000 MG Cap capsule       Indira Lee MD  Interventional Pain Management  Physical Medicine and Rehabilitation  [facility]    Deanna Irvin M*     The above note documents my personal evaluation of this patient. In addition, I have reviewed and confirmed with the patient and MA the supportive information documented in today's Patient Health Questionnaire and Office Note.     Please note that this dictation was created using voice recognition software. I have made every reasonable attempt to correct obvious errors, but I expect that there are errors of grammar and possibly content that I did not  discover before finalizing the note.

## 2022-03-17 NOTE — PATIENT INSTRUCTIONS
Pt was seen in ED, pls ask if she wants follow up, SP   You have weakness of your hip abductors. This is an important stabilizer of the lumbar spine and hips, and strengthening this muscle with physical therapy could improve your pain.

## 2022-03-22 ENCOUNTER — PRE-ADMISSION TESTING (OUTPATIENT)
Dept: ADMISSIONS | Facility: MEDICAL CENTER | Age: 87
End: 2022-03-22
Attending: SURGERY
Payer: MEDICARE

## 2022-03-22 DIAGNOSIS — Z01.810 PRE-OPERATIVE CARDIOVASCULAR EXAMINATION: ICD-10-CM

## 2022-03-22 DIAGNOSIS — Z01.812 PRE-OPERATIVE LABORATORY EXAMINATION: ICD-10-CM

## 2022-03-22 LAB
ALBUMIN SERPL BCP-MCNC: 4.5 G/DL (ref 3.2–4.9)
ALBUMIN/GLOB SERPL: 2.1 G/DL
ALP SERPL-CCNC: 38 U/L (ref 30–99)
ALT SERPL-CCNC: 21 U/L (ref 2–50)
ANION GAP SERPL CALC-SCNC: 10 MMOL/L (ref 7–16)
AST SERPL-CCNC: 21 U/L (ref 12–45)
BASOPHILS # BLD AUTO: 0.8 % (ref 0–1.8)
BASOPHILS # BLD: 0.05 K/UL (ref 0–0.12)
BILIRUB SERPL-MCNC: 0.5 MG/DL (ref 0.1–1.5)
BUN SERPL-MCNC: 24 MG/DL (ref 8–22)
CALCIUM SERPL-MCNC: 9.2 MG/DL (ref 8.5–10.5)
CHLORIDE SERPL-SCNC: 103 MMOL/L (ref 96–112)
CO2 SERPL-SCNC: 27 MMOL/L (ref 20–33)
CREAT SERPL-MCNC: 0.62 MG/DL (ref 0.5–1.4)
EKG IMPRESSION: NORMAL
EOSINOPHIL # BLD AUTO: 0.24 K/UL (ref 0–0.51)
EOSINOPHIL NFR BLD: 3.8 % (ref 0–6.9)
ERYTHROCYTE [DISTWIDTH] IN BLOOD BY AUTOMATED COUNT: 46.5 FL (ref 35.9–50)
GFR SERPLBLD CREATININE-BSD FMLA CKD-EPI: 87 ML/MIN/1.73 M 2
GLOBULIN SER CALC-MCNC: 2.1 G/DL (ref 1.9–3.5)
GLUCOSE SERPL-MCNC: 98 MG/DL (ref 65–99)
HCT VFR BLD AUTO: 45.4 % (ref 37–47)
HGB BLD-MCNC: 14.9 G/DL (ref 12–16)
IMM GRANULOCYTES # BLD AUTO: 0.02 K/UL (ref 0–0.11)
IMM GRANULOCYTES NFR BLD AUTO: 0.3 % (ref 0–0.9)
INR PPP: 1.23 (ref 0.87–1.13)
LYMPHOCYTES # BLD AUTO: 1.23 K/UL (ref 1–4.8)
LYMPHOCYTES NFR BLD: 19.2 % (ref 22–41)
MCH RBC QN AUTO: 31.5 PG (ref 27–33)
MCHC RBC AUTO-ENTMCNC: 32.8 G/DL (ref 33.6–35)
MCV RBC AUTO: 96 FL (ref 81.4–97.8)
MONOCYTES # BLD AUTO: 0.44 K/UL (ref 0–0.85)
MONOCYTES NFR BLD AUTO: 6.9 % (ref 0–13.4)
NEUTROPHILS # BLD AUTO: 4.42 K/UL (ref 2–7.15)
NEUTROPHILS NFR BLD: 69 % (ref 44–72)
NRBC # BLD AUTO: 0 K/UL
NRBC BLD-RTO: 0 /100 WBC
PLATELET # BLD AUTO: 163 K/UL (ref 164–446)
PMV BLD AUTO: 11.2 FL (ref 9–12.9)
POTASSIUM SERPL-SCNC: 4.5 MMOL/L (ref 3.6–5.5)
PROT SERPL-MCNC: 6.6 G/DL (ref 6–8.2)
PROTHROMBIN TIME: 15.2 SEC (ref 12–14.6)
RBC # BLD AUTO: 4.73 M/UL (ref 4.2–5.4)
SARS-COV-2 RNA RESP QL NAA+PROBE: NOTDETECTED
SODIUM SERPL-SCNC: 140 MMOL/L (ref 135–145)
SPECIMEN SOURCE: NORMAL
WBC # BLD AUTO: 6.4 K/UL (ref 4.8–10.8)

## 2022-03-22 PROCEDURE — U0005 INFEC AGEN DETEC AMPLI PROBE: HCPCS

## 2022-03-22 PROCEDURE — 36415 COLL VENOUS BLD VENIPUNCTURE: CPT

## 2022-03-22 PROCEDURE — 93005 ELECTROCARDIOGRAM TRACING: CPT

## 2022-03-22 PROCEDURE — 80053 COMPREHEN METABOLIC PANEL: CPT

## 2022-03-22 PROCEDURE — U0003 INFECTIOUS AGENT DETECTION BY NUCLEIC ACID (DNA OR RNA); SEVERE ACUTE RESPIRATORY SYNDROME CORONAVIRUS 2 (SARS-COV-2) (CORONAVIRUS DISEASE [COVID-19]), AMPLIFIED PROBE TECHNIQUE, MAKING USE OF HIGH THROUGHPUT TECHNOLOGIES AS DESCRIBED BY CMS-2020-01-R: HCPCS

## 2022-03-22 PROCEDURE — C9803 HOPD COVID-19 SPEC COLLECT: HCPCS

## 2022-03-22 PROCEDURE — 93010 ELECTROCARDIOGRAM REPORT: CPT | Performed by: INTERNAL MEDICINE

## 2022-03-22 PROCEDURE — 85610 PROTHROMBIN TIME: CPT

## 2022-03-22 PROCEDURE — 85025 COMPLETE CBC W/AUTO DIFF WBC: CPT

## 2022-03-22 RX ORDER — DENOSUMAB 60 MG/ML
60 INJECTION SUBCUTANEOUS
Status: ON HOLD | COMMUNITY
End: 2022-09-23

## 2022-03-22 ASSESSMENT — FIBROSIS 4 INDEX: FIB4 SCORE: 2.27

## 2022-03-28 ENCOUNTER — ANESTHESIA EVENT (OUTPATIENT)
Dept: SURGERY | Facility: MEDICAL CENTER | Age: 87
End: 2022-03-28
Payer: MEDICARE

## 2022-03-28 NOTE — PROGRESS NOTES
COVID-19 Pre-surgery screenin. Do you have an undiagnosed respiratory illness or symptoms such as coughing or sneezing? no  a. Onset of Sx   b. Acute vs. chronic respiratory illness     2. Do you have an unexplained fever greater than 100.4 degrees Fahrenheit or 38 degrees Celsius?     no    3. Have you had direct exposure to a patient who tested positive for Covid-19?      no  4. Have you had any loss of your sense of taste or smell? Have you had N/V or sore throat?   no  Patient has been informed of visitor policy and asked to wear a mask upon entering the hospital       yes

## 2022-03-29 ENCOUNTER — APPOINTMENT (OUTPATIENT)
Dept: RADIOLOGY | Facility: MEDICAL CENTER | Age: 87
End: 2022-03-29
Attending: SURGERY
Payer: MEDICARE

## 2022-03-29 ENCOUNTER — HOSPITAL ENCOUNTER (OUTPATIENT)
Facility: MEDICAL CENTER | Age: 87
End: 2022-03-29
Attending: SURGERY | Admitting: SURGERY
Payer: MEDICARE

## 2022-03-29 ENCOUNTER — ANESTHESIA (OUTPATIENT)
Dept: SURGERY | Facility: MEDICAL CENTER | Age: 87
End: 2022-03-29
Payer: MEDICARE

## 2022-03-29 VITALS
RESPIRATION RATE: 19 BRPM | BODY MASS INDEX: 27.93 KG/M2 | HEART RATE: 66 BPM | HEIGHT: 63 IN | DIASTOLIC BLOOD PRESSURE: 68 MMHG | OXYGEN SATURATION: 93 % | SYSTOLIC BLOOD PRESSURE: 148 MMHG | WEIGHT: 157.63 LBS | TEMPERATURE: 97.2 F

## 2022-03-29 DIAGNOSIS — C43.59 MALIGNANT MELANOMA OF CHEST WALL (HCC): ICD-10-CM

## 2022-03-29 DIAGNOSIS — C43.59 MALIGNANT MELANOMA OF SKIN OF TRUNK, EXCEPT SCROTUM (HCC): ICD-10-CM

## 2022-03-29 LAB — PATHOLOGY CONSULT NOTE: NORMAL

## 2022-03-29 PROCEDURE — 160009 HCHG ANES TIME/MIN: Performed by: SURGERY

## 2022-03-29 PROCEDURE — 700101 HCHG RX REV CODE 250: Performed by: SURGERY

## 2022-03-29 PROCEDURE — 160038 HCHG SURGERY MINUTES - EA ADDL 1 MIN LEVEL 2: Performed by: SURGERY

## 2022-03-29 PROCEDURE — 700105 HCHG RX REV CODE 258: Performed by: SURGERY

## 2022-03-29 PROCEDURE — 160046 HCHG PACU - 1ST 60 MINS PHASE II: Performed by: SURGERY

## 2022-03-29 PROCEDURE — 160027 HCHG SURGERY MINUTES - 1ST 30 MINS LEVEL 2: Performed by: SURGERY

## 2022-03-29 PROCEDURE — 700102 HCHG RX REV CODE 250 W/ 637 OVERRIDE(OP): Performed by: ANESTHESIOLOGY

## 2022-03-29 PROCEDURE — 38792 RA TRACER ID OF SENTINL NODE: CPT | Mod: LT

## 2022-03-29 PROCEDURE — A9270 NON-COVERED ITEM OR SERVICE: HCPCS | Performed by: ANESTHESIOLOGY

## 2022-03-29 PROCEDURE — 160002 HCHG RECOVERY MINUTES (STAT): Performed by: SURGERY

## 2022-03-29 PROCEDURE — 160048 HCHG OR STATISTICAL LEVEL 1-5: Performed by: SURGERY

## 2022-03-29 PROCEDURE — 700111 HCHG RX REV CODE 636 W/ 250 OVERRIDE (IP): Performed by: ANESTHESIOLOGY

## 2022-03-29 PROCEDURE — 160035 HCHG PACU - 1ST 60 MINS PHASE I: Performed by: SURGERY

## 2022-03-29 PROCEDURE — 88305 TISSUE EXAM BY PATHOLOGIST: CPT

## 2022-03-29 PROCEDURE — 160025 RECOVERY II MINUTES (STATS): Performed by: SURGERY

## 2022-03-29 PROCEDURE — 501838 HCHG SUTURE GENERAL: Performed by: SURGERY

## 2022-03-29 RX ORDER — ONDANSETRON 2 MG/ML
INJECTION INTRAMUSCULAR; INTRAVENOUS PRN
Status: DISCONTINUED | OUTPATIENT
Start: 2022-03-29 | End: 2022-03-29 | Stop reason: SURG

## 2022-03-29 RX ORDER — HYDRALAZINE HYDROCHLORIDE 20 MG/ML
5 INJECTION INTRAMUSCULAR; INTRAVENOUS
Status: DISCONTINUED | OUTPATIENT
Start: 2022-03-29 | End: 2022-03-29 | Stop reason: HOSPADM

## 2022-03-29 RX ORDER — MEPERIDINE HYDROCHLORIDE 25 MG/ML
5 INJECTION INTRAMUSCULAR; INTRAVENOUS; SUBCUTANEOUS
Status: DISCONTINUED | OUTPATIENT
Start: 2022-03-29 | End: 2022-03-29 | Stop reason: HOSPADM

## 2022-03-29 RX ORDER — MORPHINE SULFATE 4 MG/ML
3 INJECTION INTRAVENOUS
Status: DISCONTINUED | OUTPATIENT
Start: 2022-03-29 | End: 2022-03-29 | Stop reason: HOSPADM

## 2022-03-29 RX ORDER — MORPHINE SULFATE 4 MG/ML
2 INJECTION INTRAVENOUS
Status: DISCONTINUED | OUTPATIENT
Start: 2022-03-29 | End: 2022-03-29 | Stop reason: HOSPADM

## 2022-03-29 RX ORDER — DEXAMETHASONE SODIUM PHOSPHATE 4 MG/ML
INJECTION, SOLUTION INTRA-ARTICULAR; INTRALESIONAL; INTRAMUSCULAR; INTRAVENOUS; SOFT TISSUE PRN
Status: DISCONTINUED | OUTPATIENT
Start: 2022-03-29 | End: 2022-03-29 | Stop reason: SURG

## 2022-03-29 RX ORDER — ACETAMINOPHEN 500 MG
1000 TABLET ORAL ONCE
Status: COMPLETED | OUTPATIENT
Start: 2022-03-29 | End: 2022-03-29

## 2022-03-29 RX ORDER — HALOPERIDOL 5 MG/ML
1 INJECTION INTRAMUSCULAR
Status: DISCONTINUED | OUTPATIENT
Start: 2022-03-29 | End: 2022-03-29 | Stop reason: HOSPADM

## 2022-03-29 RX ORDER — BUPIVACAINE HYDROCHLORIDE AND EPINEPHRINE 5; 5 MG/ML; UG/ML
INJECTION, SOLUTION EPIDURAL; INTRACAUDAL; PERINEURAL
Status: DISCONTINUED | OUTPATIENT
Start: 2022-03-29 | End: 2022-03-29 | Stop reason: HOSPADM

## 2022-03-29 RX ORDER — SODIUM CHLORIDE, SODIUM LACTATE, POTASSIUM CHLORIDE, CALCIUM CHLORIDE 600; 310; 30; 20 MG/100ML; MG/100ML; MG/100ML; MG/100ML
INJECTION, SOLUTION INTRAVENOUS CONTINUOUS
Status: DISCONTINUED | OUTPATIENT
Start: 2022-03-29 | End: 2022-03-29 | Stop reason: HOSPADM

## 2022-03-29 RX ORDER — BUPIVACAINE HYDROCHLORIDE AND EPINEPHRINE 5; 5 MG/ML; UG/ML
INJECTION, SOLUTION EPIDURAL; INTRACAUDAL; PERINEURAL
Status: DISCONTINUED
Start: 2022-03-29 | End: 2022-03-29 | Stop reason: HOSPADM

## 2022-03-29 RX ORDER — DIPHENHYDRAMINE HYDROCHLORIDE 50 MG/ML
12.5 INJECTION INTRAMUSCULAR; INTRAVENOUS
Status: DISCONTINUED | OUTPATIENT
Start: 2022-03-29 | End: 2022-03-29 | Stop reason: HOSPADM

## 2022-03-29 RX ORDER — OXYCODONE HCL 5 MG/5 ML
5 SOLUTION, ORAL ORAL
Status: DISCONTINUED | OUTPATIENT
Start: 2022-03-29 | End: 2022-03-29 | Stop reason: HOSPADM

## 2022-03-29 RX ORDER — GABAPENTIN 300 MG/1
300 CAPSULE ORAL ONCE
Status: COMPLETED | OUTPATIENT
Start: 2022-03-29 | End: 2022-03-29

## 2022-03-29 RX ORDER — LABETALOL HYDROCHLORIDE 5 MG/ML
5 INJECTION, SOLUTION INTRAVENOUS
Status: DISCONTINUED | OUTPATIENT
Start: 2022-03-29 | End: 2022-03-29 | Stop reason: HOSPADM

## 2022-03-29 RX ORDER — ONDANSETRON 2 MG/ML
4 INJECTION INTRAMUSCULAR; INTRAVENOUS
Status: DISCONTINUED | OUTPATIENT
Start: 2022-03-29 | End: 2022-03-29 | Stop reason: HOSPADM

## 2022-03-29 RX ORDER — CEFAZOLIN SODIUM 1 G/3ML
INJECTION, POWDER, FOR SOLUTION INTRAMUSCULAR; INTRAVENOUS PRN
Status: DISCONTINUED | OUTPATIENT
Start: 2022-03-29 | End: 2022-03-29 | Stop reason: SURG

## 2022-03-29 RX ORDER — TRAMADOL HYDROCHLORIDE 50 MG/1
50 TABLET ORAL EVERY 6 HOURS PRN
Qty: 10 TABLET | Refills: 0 | Status: SHIPPED | OUTPATIENT
Start: 2022-03-29 | End: 2022-04-01

## 2022-03-29 RX ORDER — MORPHINE SULFATE 4 MG/ML
1 INJECTION INTRAVENOUS
Status: DISCONTINUED | OUTPATIENT
Start: 2022-03-29 | End: 2022-03-29 | Stop reason: HOSPADM

## 2022-03-29 RX ADMIN — FENTANYL CITRATE 50 MCG: 50 INJECTION, SOLUTION INTRAMUSCULAR; INTRAVENOUS at 11:44

## 2022-03-29 RX ADMIN — ONDANSETRON 4 MG: 2 INJECTION INTRAMUSCULAR; INTRAVENOUS at 11:49

## 2022-03-29 RX ADMIN — PROPOFOL 80 MG: 10 INJECTION, EMULSION INTRAVENOUS at 11:44

## 2022-03-29 RX ADMIN — CEFAZOLIN 2 G: 330 INJECTION, POWDER, FOR SOLUTION INTRAMUSCULAR; INTRAVENOUS at 11:48

## 2022-03-29 RX ADMIN — FENTANYL CITRATE 50 MCG: 50 INJECTION, SOLUTION INTRAMUSCULAR; INTRAVENOUS at 11:53

## 2022-03-29 RX ADMIN — DEXAMETHASONE SODIUM PHOSPHATE 4 MG: 4 INJECTION, SOLUTION INTRA-ARTICULAR; INTRALESIONAL; INTRAMUSCULAR; INTRAVENOUS; SOFT TISSUE at 11:48

## 2022-03-29 RX ADMIN — SODIUM CHLORIDE, POTASSIUM CHLORIDE, SODIUM LACTATE AND CALCIUM CHLORIDE: 600; 310; 30; 20 INJECTION, SOLUTION INTRAVENOUS at 11:36

## 2022-03-29 RX ADMIN — GABAPENTIN 300 MG: 300 CAPSULE ORAL at 11:34

## 2022-03-29 RX ADMIN — PROPOFOL 40 MG: 10 INJECTION, EMULSION INTRAVENOUS at 12:04

## 2022-03-29 RX ADMIN — ACETAMINOPHEN 1000 MG: 500 TABLET ORAL at 11:33

## 2022-03-29 ASSESSMENT — PAIN DESCRIPTION - PAIN TYPE
TYPE: SURGICAL PAIN

## 2022-03-29 ASSESSMENT — FIBROSIS 4 INDEX: FIB4 SCORE: 2.39

## 2022-03-29 ASSESSMENT — PAIN SCALES - GENERAL: PAIN_LEVEL: 0

## 2022-03-29 NOTE — OR NURSING
1229- pt arrives from OR to PACU 8, report received from RN and anesthesia. Pt place on monitor. VSS,  NAD noted. O2 4L via mask.    Dressing to left upper chest CDI, pt wake to voice and denies nausea and pain, falls back to sleep.     1253- pt more awake, denies pain and nausea.  Tolerating sips of water    1304- report given to [place], RN and pt transferred to phase II, no assessment changes.

## 2022-03-29 NOTE — OR NURSING
1305 - Pt to Phase II from PACU, denies pain or nausea, VSS, room air,  Chest incision covered with dressings that are clean/dry/intact.  No signs of bleeding or overt pain noted.      1343 - Pt stable to discharge.  Instructions given, patient and son verbalize understanding.  IV And armbands removed.  Taken via wheelchair to car.  Pt has all belongings with them.

## 2022-03-29 NOTE — ANESTHESIA PREPROCEDURE EVALUATION
Case: [id number] Date/Time: 03/29/22 1145    Procedures:       WIDE EXCISION, LESION, HEAD AND NECK REGION - RADICAL MALIGNANT MELANOMA OF LEFT CHEST      BIOPSY, LYMPH NODE, SENTINEL - LEFT, AXILLARY    Pre-op diagnosis: MALIGNANT MELANOMA OF SKIN OF CHEST    Location: [facility] ROOM 24 / SURGERY SAME DAY [facility]    Surgeons: Bernardino Torres M.D.          Relevant Problems   NEURO   (positive) History of colon cancer - s/p resection      CARDIAC   (positive) Deep vein thrombosis - recurrent, on chronic anticoagulation   (positive) Essential hypertension, benign   (positive) Moderate aortic stenosis   (positive) Right bundle branch block      GI   (positive) GERD (gastroesophageal reflux disease)      Other   (positive) Rheumatoid arthritis involving multiple sites with positive rheumatoid factor (HCC)   (positive) Rheumatoid nodule (HCC)       Physical Exam    Airway   Mallampati: II  TM distance: >3 FB  Neck ROM: full       Cardiovascular   Rhythm: regular  Rate: normal  (+) murmur     Dental    Pulmonary - normal exam  Breath sounds clear to auscultation     Abdominal    Neurological - normal exam               Anesthesia Plan    ASA 3   ASA physical status 3 criteria: other (comment)    Plan - general       Airway plan will be LMA          Induction: intravenous    Postoperative Plan: Postoperative administration of opioids is intended.    Pertinent diagnostic labs and testing reviewed    Informed Consent:    Anesthetic plan and risks discussed with patient.    Use of blood products discussed with: patient whom consented to blood products.

## 2022-03-29 NOTE — ANESTHESIA PROCEDURE NOTES
Airway    Date/Time: 3/29/2022 11:47 AM  Performed by: Rj Robledo M.D.  Authorized by: Rj Robledo M.D.     Location:  OR  Urgency:  Elective  Difficult Airway: No    Indications for Airway Management:  Anesthesia      Spontaneous Ventilation: absent    Sedation Level:  Deep  Preoxygenated: Yes    Mask Difficulty Assessment:  1 - vent by mask  Final Airway Type:  Supraglottic airway  Final Supraglottic Airway:  Standard LMA    SGA Size:  4  Number of Attempts at Approach:  1  Ventilation Between Attempts:  None  Number of Other Approaches Attempted:  0

## 2022-03-29 NOTE — DISCHARGE INSTRUCTIONS
ACTIVITY: Rest and take it easy for the first 24 hours.  A responsible adult is recommended to remain with you during that time.  It is normal to feel sleepy.  We encourage you to not do anything that requires balance, judgment or coordination.    MILD FLU-LIKE SYMPTOMS ARE NORMAL. YOU MAY EXPERIENCE GENERALIZED MUSCLE ACHES, THROAT IRRITATION, HEADACHE AND/OR SOME NAUSEA.    FOR 24 HOURS DO NOT:  Drive, operate machinery or run household appliances.  Drink beer or alcoholic beverages.   Make important decisions or sign legal documents.    SPECIAL INSTRUCTIONS: Drink plenty of fluids, call the doctor for any concerns.    DIET: To avoid nausea, slowly advance diet as tolerated, avoiding spicy or greasy foods for the first day.  Add more substantial food to your diet according to your physician's instructions.  Babies can be fed formula or breast milk as soon as they are hungry.  INCREASE FLUIDS AND FIBER TO AVOID CONSTIPATION.    SURGICAL DRESSING/BATHING: *Remove plastic and gauze in 3 days   Leave underlying steristips on until they fall off   Patient may shower on Post OP Day #2**    FOLLOW-UP APPOINTMENT:  A follow-up appointment should be arranged with your doctor in *call to schedule**    You should CALL YOUR PHYSICIAN if you develop:  Fever greater than 101 degrees F.  Pain not relieved by medication, or persistent nausea or vomiting.  Excessive bleeding (blood soaking through dressing) or unexpected drainage from the wound.  Extreme redness or swelling around the incision site, drainage of pus or foul smelling drainage.  Inability to urinate or empty your bladder within 8 hours.  Problems with breathing or chest pain.    You should call 911 if you develop problems with breathing or chest pain.  If you are unable to contact your doctor or surgical center, you should go to the nearest emergency room or urgent care center.  Physician's telephone #: *Dr Torres [phone]**    If any questions arise, call your  doctor.  If your doctor is not available, please feel free to call the Surgical Center at [phone].     A registered nurse may call you a few days after your surgery to see how you are doing after your procedure.    MEDICATIONS: Resume taking daily medication.  Take prescribed pain medication with food.  If no medication is prescribed, you may take non-aspirin pain medication if needed.  PAIN MEDICATION CAN BE VERY CONSTIPATING.  Take a stool softener or laxative such as senokot, pericolace, or milk of magnesia if needed.    Prescription given for *Tramadol, sent to [facility]**.  Last pain medication given at ***.    If your physician has prescribed pain medication that includes Acetaminophen (Tylenol), do not take additional Acetaminophen (Tylenol) while taking the prescribed medication.    Depression / Suicide Risk    As you are discharged from this [facility] facility, it is important to learn how to keep safe from harming yourself.    Recognize the warning signs:  · Abrupt changes in personality, positive or negative- including increase in energy   · Giving away possessions  · Change in eating patterns- significant weight changes-  positive or negative  · Change in sleeping patterns- unable to sleep or sleeping all the time   · Unwillingness or inability to communicate  · Depression  · Unusual sadness, discouragement and loneliness  · Talk of wanting to die  · Neglect of personal appearance   · Rebelliousness- reckless behavior  · Withdrawal from people/activities they love  · Confusion- inability to concentrate     If you or a loved one observes any of these behaviors or has concerns about self-harm, here's what you can do:  · Talk about it- your feelings and reasons for harming yourself  · Remove any means that you might use to hurt yourself (examples: pills, rope, extension cords, firearm)  · Get professional help from the community (Mental Health, Substance Abuse, psychological counseling)  · Do not  be alone:Call your Safe Contact- someone whom you trust who will be there for you.  · Call your local CRISIS HOTLINE 494-3613 or 501-381-0279  · Call your local Children's Mobile Crisis Response Team [place] (429) 020-9742 or www.[employer]  · Call the toll free National Suicide Prevention Hotlines   · National Suicide Prevention Lifeline 910-574-IYGJ (0755)  · National [employer] Network 800-SUICIDE (105-5419)

## 2022-03-29 NOTE — ANESTHESIA POSTPROCEDURE EVALUATION
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 Responsible Provider: Rj Robledo M.D.    Anesthesia Type: general ASA Status: 3          Final Anesthesia Type: general  Last vitals  BP   Blood Pressure : 157/77    Temp   36.5 °C (97.7 °F)    Pulse   77   Resp   18    SpO2   95 %      Anesthesia Post Evaluation    Patient location during evaluation: PACU  Patient participation: complete - patient participated  Level of consciousness: lethargic  Pain score: 0    Airway patency: patent  Anesthetic complications: no  Cardiovascular status: hemodynamically stable  Respiratory status: acceptable and face mask  Hydration status: euvolemic    PONV: none          No complications documented.     Nurse Pain Score: 0 (NPRS)

## 2022-03-29 NOTE — OR SURGEON
Immediate Post OP Note    PreOp Diagnosis: Malignant Melanoma of Left Upper chest near clavicle      PostOp Diagnosis: same      Procedure(s):  WIDE EXCISION, LESION,   - RADICAL MALIGNANT MELANOMA OF LEFT UPPER CHEST - Wound Class: Clean    Surgeon(s):  Bernardino Torres M.D.    Anesthesiologist/Type of Anesthesia:  Anesthesiologist: Rj Robledo M.D./General    Surgical Staff:  Assistant: ELBA Liz  Circulator: Angela Paz R.N.; Jasmyne Freedman R.N.  Scrub Person: Sharita Manley    Specimens removed if any:  ID Type Source Tests Collected by Time Destination   A : Left upper chest melanoma short superior long lateral Other Other PATHOLOGY SPECIMEN Bernardino Torres M.D. 3/29/2022 1204        Estimated Blood Loss: minimal    Findings: residual melanoma with apparent satellite lesion removed en-bloc with minimum 1 cm margin with elliptical excision;  The pt did have radionuclide injection and lymphoscintigraphy but no movement of the tracer was noted after 90 minutes. No sentinel node could be identified.    Complications: no apparent complications        3/29/2022 12:33 PM Bernardino Torres M.D.

## 2022-03-29 NOTE — OR NURSING
Received pt from the OR with Dr Robledo.  VSS, in no signs of distress. Pt aware of POC.    Discharge instructions given to pt and family, both verbalize understanding.   Pt meets discharge criteria. Able to dress and steady on feet.    Pt discharged, taken to car in WC by volunteer.  All questions/concerns addressed.

## 2022-03-29 NOTE — ANESTHESIA TIME REPORT
Anesthesia Start and Stop Event Times     Date Time Event    3/29/2022 1010 Ready for Procedure     1140 Anesthesia Start     1230 Anesthesia Stop        Responsible Staff  03/29/22    Name Role Begin End    August W DIONISIO Robledo Anesth 1140 1230        Preop Diagnosis (Free Text):  Pre-op Diagnosis     MALIGNANT MELANOMA OF SKIN OF CHEST        Preop Diagnosis (Codes):    Premium Reason  Non-Premium    Comments:

## 2022-03-30 NOTE — OP REPORT
DATE OF SERVICE:  03/29/2022        SURGEON:  Bernardino Torres MD     ASSISTANT:  TREVON Cagle     ANESTHESIOLOGIST:  Rj Robledo MD     TYPE OF ANESTHETIC:  General anesthetic using laryngeal mask airway plus local   0.5% Marcaine with epinephrine.     PREOPERATIVE DIAGNOSIS:  Malignant melanoma of the left neck and the   periclavicular area.     POSTOPERATIVE DIAGNOSIS:  Malignant melanoma of the left neck and the   periclavicular area.     PROCEDURE:  Wide excision of malignant melanoma of the left upper chest/neck   area with primary closure.     FINDINGS:  The patient is an 86-year-old female who was recently found to have   a lesion that was growing just in the medial aspect of the periclavicular   area between the neck and chest area.  Biopsy of this revealed a malignant   melanoma with a Breslow depth of 1.1 mm.  The margins were positive resection   and there is no ulceration.  Mitotic rate was 4 per square mm.  Options and   NCCN guidelines were reviewed and the patient elected to proceed with a   radical wide excision of the melanoma along with a sentinel node biopsy today.    She had a  radionuclide injection by the nuclear medicine physician into the   area around the melanoma.  Unfortunately, even after 90 minutes of   evaluation, there was no movement of the tracer away from the primary melanoma   site.  Therefore, the sentinel node could not be performed.  It should be   noted the patient did not have any palpable adenopathy either in the cervical   or axillary area.  The patient did undergo a radical wide excision of the   melanoma, which included a 1 cm minimal margin on the medial and lateral   aspects and a 4 cm margin on the superior and inferior aspect.  There were no   apparent complications.     FINDING AND PROCEDURE:  The patient was brought to the operating room and   placed on the table in supine position.  General anesthesia was then provided   by the anesthesiologist,  Dr. Robledo using a laryngeal mask airway, the left   neck and chest area were prepped and draped in the usual sterile fashion.  A   timeout was called.  The correct patient, correct diagnosis, correct surgery,   and correct location of surgery were discussed and agreed upon.  She received   preoperative IV antibiotics.  The melanoma was then evaluated and confirmed.   There was a vertical lesion that was red and cystic in nature that extended   approximately 1.5 cm and there was a second nodule about a centimeter below   the first.  A ruler was used to rashaun a 1 cm margin around the entire complex   and then an elliptical incision was designed to incorporate this 1 cm minimal   margin extending superiorly and inferiorly approximately 4-5 cm.  Incision was   made through the skin and dermis with #15 blade.  All bleeding was controlled   with electrocautery.  Dissection was then carried down until the fascia of   the pectoralis major and the tissue over the clavicle was cleared off of the   perichondrial tissue.  The entire specimen was removed en bloc and then   oriented with sutures for the pathologist.  It was then sent to pathology for   further characterization.  The remaining tissue in this area was then elevated   off the surrounding fascia circumferentially for approximately 1 to 2 cm in   all directions.  This provided enough laxity for the wound edges to be brought   back together.  A 0.5% Marcaine with epinephrine was used for local   anesthesia and the wound was irrigated, 3-0 Vicryl sutures in interrupted   fashion were used to close the wound in interrupted fashion.  The skin was   closed using interrupted sutures of 3-0 nylon.  Steri-Strips and sterile   dressing were applied.  The patient was then transferred back to recovery room   for further postoperative care.  There were no apparent complications.     An assistant was utilized for this surgery, was absolutely necessary for   completion of the  operation; assisted with retraction of the skin edges while   the radical resection was performed and then assisted with the multilayer   closure.         SYNOPTIC:  ELEMENT RESPONSE OPTIONS   Operation performed with curative intent Yes   Original breast low thickness of the lesion 1.1 mm (to the tenth of a millimeter)   Clinical margin width (measured from the edge of the lesion to the prior excision scar) 1.0 cm   Depth of excision Full-thickness skin/subcutaneous tissue down to fascia (melanoma)              ______________________________  JOSELIN MCFARLANE MD    [facility]P    DD:  03/29/2022 19:53  DT:  03/29/2022 21:31    Job#:  [id number]    CC:DOMINGO DYE MD

## 2022-03-31 ENCOUNTER — HOSPITAL ENCOUNTER (OUTPATIENT)
Dept: RADIOLOGY | Facility: MEDICAL CENTER | Age: 87
End: 2022-03-31
Attending: STUDENT IN AN ORGANIZED HEALTH CARE EDUCATION/TRAINING PROGRAM
Payer: MEDICARE

## 2022-03-31 DIAGNOSIS — M54.41 CHRONIC BILATERAL LOW BACK PAIN WITH RIGHT-SIDED SCIATICA: ICD-10-CM

## 2022-03-31 DIAGNOSIS — G89.29 CHRONIC BILATERAL LOW BACK PAIN WITH RIGHT-SIDED SCIATICA: ICD-10-CM

## 2022-03-31 PROCEDURE — 72148 MRI LUMBAR SPINE W/O DYE: CPT | Mod: ME

## 2022-04-04 DIAGNOSIS — K21.9 GASTROESOPHAGEAL REFLUX DISEASE WITHOUT ESOPHAGITIS: ICD-10-CM

## 2022-04-04 RX ORDER — OMEPRAZOLE 20 MG/1
20 CAPSULE, DELAYED RELEASE ORAL DAILY
Qty: 90 CAPSULE | Refills: 3 | Status: SHIPPED | OUTPATIENT
Start: 2022-04-04 | End: 2023-05-03 | Stop reason: SDUPTHER

## 2022-04-04 NOTE — TELEPHONE ENCOUNTER
Requested Prescriptions     Signed Prescriptions Disp Refills   • omeprazole (PRILOSEC) 20 MG delayed-release capsule 90 Capsule 3     Sig: Take 1 Capsule by mouth every day.     Authorizing Provider: ORALIA MONTANO A.P.R.N.    Name band;

## 2022-04-07 ENCOUNTER — OFFICE VISIT (OUTPATIENT)
Dept: PHYSICAL MEDICINE AND REHAB | Facility: MEDICAL CENTER | Age: 87
End: 2022-04-07
Payer: MEDICARE

## 2022-04-07 VITALS
OXYGEN SATURATION: 95 % | BODY MASS INDEX: 28.05 KG/M2 | TEMPERATURE: 97.8 F | WEIGHT: 158.29 LBS | SYSTOLIC BLOOD PRESSURE: 128 MMHG | DIASTOLIC BLOOD PRESSURE: 60 MMHG | HEIGHT: 63 IN | HEART RATE: 70 BPM

## 2022-04-07 DIAGNOSIS — R20.0 NUMBNESS AND TINGLING OF RIGHT LEG: ICD-10-CM

## 2022-04-07 DIAGNOSIS — G89.29 CHRONIC BILATERAL LOW BACK PAIN WITH RIGHT-SIDED SCIATICA: ICD-10-CM

## 2022-04-07 DIAGNOSIS — M54.16 LUMBAR RADICULOPATHY: Primary | ICD-10-CM

## 2022-04-07 DIAGNOSIS — M05.79 RHEUMATOID ARTHRITIS INVOLVING MULTIPLE SITES WITH POSITIVE RHEUMATOID FACTOR (HCC): ICD-10-CM

## 2022-04-07 DIAGNOSIS — R20.2 NUMBNESS AND TINGLING OF RIGHT LEG: ICD-10-CM

## 2022-04-07 DIAGNOSIS — G62.9 NEUROPATHY: ICD-10-CM

## 2022-04-07 DIAGNOSIS — M47.816 LUMBAR SPONDYLOSIS: ICD-10-CM

## 2022-04-07 DIAGNOSIS — M54.41 CHRONIC BILATERAL LOW BACK PAIN WITH RIGHT-SIDED SCIATICA: ICD-10-CM

## 2022-04-07 DIAGNOSIS — M48.061 SPINAL STENOSIS OF LUMBAR REGION, UNSPECIFIED WHETHER NEUROGENIC CLAUDICATION PRESENT: ICD-10-CM

## 2022-04-07 PROCEDURE — 99214 OFFICE O/P EST MOD 30 MIN: CPT | Performed by: STUDENT IN AN ORGANIZED HEALTH CARE EDUCATION/TRAINING PROGRAM

## 2022-04-07 RX ORDER — SODIUM FLUORIDE 5 MG/G
CREAM DENTAL
COMMUNITY
Start: 2022-03-24 | End: 2022-10-03

## 2022-04-07 ASSESSMENT — PATIENT HEALTH QUESTIONNAIRE - PHQ9
SUM OF ALL RESPONSES TO PHQ QUESTIONS 1-9: 3
5. POOR APPETITE OR OVEREATING: 0 - NOT AT ALL
CLINICAL INTERPRETATION OF PHQ2 SCORE: 1

## 2022-04-07 ASSESSMENT — PAIN SCALES - GENERAL: PAINLEVEL: 9=SEVERE PAIN

## 2022-04-07 ASSESSMENT — FIBROSIS 4 INDEX: FIB4 SCORE: 2.39

## 2022-04-07 NOTE — PROGRESS NOTES
Follow-up patient Note    Interventional Pain and Spine  Physiatry (Physical Medicine and Rehabilitation)     Patient Name: Marry Mathur  : 1935  Date of Service: 2022      Chief Complaint:   Chief Complaint   Patient presents with   • Follow-Up     Chronic bilateral low back pain with right-sided sciatica       HISTORY 3/17/22  Marry Mathur is a 86 y.o. female who presents today with constant low back pain and burning lateral right leg pain that started a couple of years ago with no known inciting event. States she has seen Dr. Cooper for multiple years, most recently 4 months ago, but feels that her pain continues to worsen so she seeks to reestablish care with a new pain provider.     Her pain at its best-worse level during the course of the day is 4-9/10, respectively. Pain right now is 7/10 on the numeric pain scale. Pain worsens with standing and walking and improves slightly with rest and sitting. Her pain interferes somewhat with ADLs. Pain interferes with her ability to work in her yard.  The patient denies new weakness, numbness, or bladder/bowel incontinence. Denies pain in her left leg. Endorses history of neuropathy in feet with no known inciting event. Had an EMG done with Dr. Booth which revealed a left peroneal axonal mononeuropathy per chart. Has hx of RA, feels that her RA symptoms are stable.     Notes that she is able to ambulate more easily by bending forward and using a shopping cart. Notes that propping her legs in a recliner improves her pain.     On xarelto. Seeing Dr. Rodney. Pt states she has been able to stop xarelto for procedures in the past.     The patient is currently going to physical therapy for this problem, most recently last week. Endorses improvement with dry needling lasting 12-24h.     Patient has tried the following medications with varied success (current meds in bold):   Gabapentin 300mg QHS  Tylenol 500mg BID     Also taking fish  oil     Therapeutic modalities and interventional therapies to date include:  -multiple per chart. Pt states the most helpful were bilateral 2-level facet joint injections with helped for a few months  - Lumbar epidural  - pt notes she has had multiple other injections that are not viewable in her [facility] chart     Medical history includes osteoporosis, protein S deficiency, left TKA, left CARLYLE, RA, HLD, HTN, GERD, DVT     Denies hx of back surgery.    HPI  Today's visit   Marry Mathur ( 1935) is a female with Diagnoses of Lumbar spondylosis, Chronic bilateral low back pain with right-sided sciatica, Spinal stenosis of lumbar region, unspecified whether neurogenic claudication present, Rheumatoid arthritis involving multiple sites with positive rheumatoid factor (HCC), Neuropathy, Numbness and tingling of right leg, and Lumbar radiculopathy were pertinent to this visit.    Ongoing pain radiating from her right low back down her right leg with numbness in this distribution.  She is unsure if this extends to her foot as she has chronic neuropathy in both feet.  Pain severity currently 8-9/10.  It is gradually worsening.  She continues to take gabapentin 300 milligrams nightly and Tylenol as needed.  Endorses ongoing pain limited weakness.  She continues to do physical therapy.    Pt denies new numbness, tingling, or weakness.    Medical records review:  Op note excision malignant melanoma of left chest on 3/29/22.     Notes from outside [facility] reviewed. Indicating:  - L3-4 ILESI on 20  - right L3-4 ILESI on 10/29/2020 - 30% pain relief  - bilateral L4-5 and L5-S1 facet joint injections 12/15/2020 - 50-70% pain relief  - right L3-4 ILESI on 21 - 50% pain relief  - right L2-L4 MBB on 21 with significant pain relief, right L2-L4 MBB on 9/3/21 with significant pain relief, right L2-L4 RFA 10/15/21  One note mentions resolution of leg pain after procedure  "targeting the facet    EMG 8/31/21 mild axonal sensorimotor polyneuropathy    ROS:   Red Flags ROS:   Fever, Chills, Sweats: Denies  Involuntary Weight Loss: Denies  Bladder Incontinence: Denies  Bowel Incontinence: denies  Saddle Anesthesia: Denies    All other systems reviewed and negative.     PMHx:   Past Medical History:   Diagnosis Date   • Adenocarcinoma of colon (HCC) 10/30/2018    From sessile serrated polyp 10/2018   • Anesthesia     pt states \"one new dr scraped my esophagus when they put the tube in and I started bleeding so they couldn't operate\"    • Atrial fibrillation [I48.91] 4/19/2016     pt denies    • Back pain 6/27/2012   • Blood clotting disorder (HCC) 2012    clot in leg   • Bowel habit changes     constipation    • Cancer (HCC)     skin, colon 2018   • Cataract     belia IOL    • Chronic back pain greater than 3 months duration    • Deep vein thrombosis (HCC) 3/8/2016    First occurrence in LLE in late 1970s Second occurrence further up in LLE in 2012, has been on AC since    • Essential hypertension, benign 6/27/2012   • GERD (gastroesophageal reflux disease) 6/27/2012   • Heart murmur    • Hyperlipidemia    • Hypertension     hx of, not currently    • Impaired fasting glucose 11/2/2017   • Mild aortic stenosis 11/2/2017   • Other and unspecified hyperlipidemia 6/27/2012   • Prediabetes    • Protein S deficiency (HCC) 11/2/2017    Noted in lab work 2013 as a part of work up at [facility]    • Rheumatoid arthritis involving multiple sites with positive rheumatoid factor (HCC) 03/14/2016    Dr. Escoto   • Rheumatoid nodule (HCC) 7/25/2017   • Right bundle branch block 6/27/2012    pt denies    • Urinary incontinence 11/2/2017       PSHx:   Past Surgical History:   Procedure Laterality Date   • WIDE EXCISION, LESION, HEAD AND NECK REGION Left 3/29/2022    Procedure: WIDE EXCISION, LESION, HEAD AND NECK REGION - RADICAL MALIGNANT MELANOMA OF LEFT CHEST;  Surgeon: Bernardnio Torres M.D.;  " Location: SURGERY SAME DAY [facility];  Service: General   • LUMBAR MEDIAL BRANCH BLOCKS Bilateral 12/15/2020    Procedure: BLOCK, NERVE, SPINAL, LUMBAR, POSTERIOR RAMUS, MEDIAL BRANCH;  Surgeon: Chris Cooper M.D.;  Location: [facility];  Service: Pain Management   • IRRIGATION & DEBRIDEMENT ORTHO Bilateral 7/31/2020    Procedure: IRRIGATION AND DEBRIDEMENT, WOUND - KNEE;  Surgeon: Roosevelt Saucedo M.D.;  Location: SURGERY [facility];  Service: Orthopedics   • HEMICOLECTOMY Right 12/13/2018    Procedure: OPEN RIGHT HEMICOLECTOMY;  Surgeon: Dash Bhagat M.D.;  Location: SURGERY [facility];  Service: General   • INGUINAL HERNIA REPAIR Right 9/23/2016    Procedure: INGUINAL HERNIA REPAIR - PRIMARY;  Surgeon: Mary Medina M.D.;  Location: SURGERY [facility];  Service:    • OTHER  08/12/2014    peroneal nerve surgery Dr. Castro    • OTHER ORTHOPEDIC SURGERY  2014    left knee partial   • OTHER ORTHOPEDIC SURGERY  2011    meniscus repair   • ARTHROPLASTY      partial TKA with Dr. Persaud   • CHOLECYSTECTOMY     • HYSTERECTOMY, TOTAL ABDOMINAL  1970's   • ROTATOR CUFF REPAIR Bilateral        Family Hx:   Family History   Problem Relation Age of Onset   • Cancer Mother         stomach   • Cancer Father         colon   • Leukemia Father         many exposure, worked for  [employer]    • Heart Disease Brother         s/p stent        Social Hx:  Social History     Socioeconomic History   • Marital status:      Spouse name: Not on file   • Number of children: Not on file   • Years of education: Not on file   • Highest education level: Not on file   Occupational History   • Not on file   Tobacco Use   • Smoking status: Never Smoker   • Smokeless tobacco: Never Used   Vaping Use   • Vaping Use: Never used   Substance and Sexual Activity   • Alcohol use: Yes     Alcohol/week: 0.0 oz     Comment: very rare   • Drug use: No   • Sexual activity: Not Currently     Partners: Male      Comment:     Other Topics Concern   • Not on file   Social History Narrative    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   Outpatient Medications Marked as Taking for the 4/7/22 encounter (Office Visit) with Indira Lee M.D.   Medication Sig Dispense Refill   • SODIUM FLUORIDE 5000 PLUS 1.1 % Cream BRUSH WITH PEA SIZED AMOUNT EVERY DAY PREFERABLY BEFORE BEDTIME. SPIT OUT ALL EXCESS. DO NOT RINSE     • hydroxychloroquine (PLAQUENIL) 200 MG Tab 11/2 tablet p.o. daily 135 Tablet 0   • omeprazole (PRILOSEC) 20 MG delayed-release capsule Take 1 Capsule by mouth every day. 90 Capsule 3   • lidocaine (LIDODERM) 5 % Patch APPLY ONE PATCH TO CLEAN DRY SKIN AS DIRECTED DAILY 90 Patch 1   • denosumab (PROLIA) 60 MG/ML Solution Prefilled Syringe injection Inject 60 mg under the skin every 6 months.     • Polyethylene Glycol 3350 (MIRALAX PO) Take  by mouth every day at 6 PM. 1 teaspoon daily     • Magnesium 400 MG Cap      • B Complex Vitamins (B COMPLEX B-12 PO) Take  by mouth.     • Omega-3 Fatty Acids (FISH OIL) 1000 MG Cap capsule Take 1,000 mg by mouth 3 times a day with meals.     • gabapentin (NEURONTIN) 300 MG Cap 1 tab p.o. nightly 90 Capsule 0   • Calcium Carb-Cholecalciferol 500-400 MG-UNIT Tab Take 1 Tablet by mouth at bedtime.     • rivaroxaban (XARELTO) 20 MG Tab tablet Take 1 Tablet by mouth with dinner. 90 Tablet 1   • atorvastatin (LIPITOR) 10 MG Tab Take 1 tablet by mouth every evening. 90 tablet 3   •  Potassium 99 MG Tab Take 99 mg by mouth every day.     • diphenhydrAMINE-APAP, sleep,  MG Tab Take 2 Tablets by mouth at bedtime.     • acetaminophen (TYLENOL) 500 MG Tab Take 1,000 mg by mouth 2 Times a Day.     • Melatonin 10 MG Tab Take 20 mg by mouth at bedtime.     • vitamin D (CHOLECALCIFEROL) 1000 UNIT Tab Take 1,000 Units by mouth every morning.          Current Outpatient Medications on File Prior to Visit   Medication Sig Dispense Refill   • SODIUM FLUORIDE 5000 PLUS 1.1 % Cream BRUSH WITH PEA SIZED AMOUNT EVERY DAY PREFERABLY BEFORE BEDTIME. SPIT OUT ALL EXCESS. DO NOT RINSE     • hydroxychloroquine (PLAQUENIL) 200 MG Tab 11/2 tablet p.o. daily 135 Tablet 0   • omeprazole (PRILOSEC) 20 MG delayed-release capsule Take 1 Capsule by mouth every day. 90 Capsule 3   • lidocaine (LIDODERM) 5 % Patch APPLY ONE PATCH TO CLEAN DRY SKIN AS DIRECTED DAILY 90 Patch 1   • denosumab (PROLIA) 60 MG/ML Solution Prefilled Syringe injection Inject 60 mg under the skin every 6 months.     • Polyethylene Glycol 3350 (MIRALAX PO) Take  by mouth every day at 6 PM. 1 teaspoon daily     • Magnesium 400 MG Cap      • B Complex Vitamins (B COMPLEX B-12 PO) Take  by mouth.     • Omega-3 Fatty Acids (FISH OIL) 1000 MG Cap capsule Take 1,000 mg by mouth 3 times a day with meals.     • gabapentin (NEURONTIN) 300 MG Cap 1 tab p.o. nightly 90 Capsule 0   • Calcium Carb-Cholecalciferol 500-400 MG-UNIT Tab Take 1 Tablet by mouth at bedtime.     • rivaroxaban (XARELTO) 20 MG Tab tablet Take 1 Tablet by mouth with dinner. 90 Tablet 1   • atorvastatin (LIPITOR) 10 MG Tab Take 1 tablet by mouth every evening. 90 tablet 3   • Potassium 99 MG Tab Take 99 mg by mouth every day.     • diphenhydrAMINE-APAP, sleep,  MG Tab Take 2 Tablets by mouth at bedtime.     • acetaminophen (TYLENOL) 500 MG Tab Take 1,000 mg by mouth 2 Times a Day.     • Melatonin 10 MG Tab Take 20 mg by mouth at bedtime.     • vitamin D (CHOLECALCIFEROL) 1000 UNIT  "Tab Take 1,000 Units by mouth every morning.       No current facility-administered medications on file prior to visit.         EXAMINATION     Physical Exam:   /60 (BP Location: Right arm, Patient Position: Sitting, BP Cuff Size: Adult)   Pulse 70   Temp 36.6 °C (97.8 °F) (Temporal)   Ht 1.6 m (5' 3\")   Wt 71.8 kg (158 lb 4.6 oz)   SpO2 95%     Constitutional:   Body Habitus: Body mass index is 28.04 kg/m².  Cooperation: Fully cooperates with exam  Appearance: Well-groomed, well-nourished.    Eyes: No scleral icterus to suggest severe liver disease, no proptosis to suggest severe hyperthyroidism    ENT -no obvious auditory deficits, no noticeable facial droop     Skin -no rashes or lesions noted     Respiratory-  breathing comfortably on room air, no audible wheezing    Cardiovascular-distal extremities warm and well perfused.  No lower extremity edema is noted.     Gastrointestinal - no obvious abdominal masses, non-distended    Psychiatric- alert and oriented ×3. Normal affect.     Gait - antalgic gait favoring right leg. Uses cane for balance.     Musculoskeletal and Neuro -      Thoracic/Lumbar Spine/Sacral Spine/Hips   Inspection: No evidence of atrophy in bilateral lower extremities throughout      ROM: limited active range of motion with lumbar flexion, lateral flexion, and rotation bilaterally.   There is limited active range of motion with lumbar extension     Facet loading maneuver positive bilaterally     Palpation:   Tenderness to palpation over the midline of lumbosacral spine, paraspinal muscles bilaterally and lumbar facets bilaterally. No tenderness to palpation elsewhere in the low back/hips including sacroiliac joints bilaterally, PSIS bilaterally and greater trochanters bilaterally.     Lumbar spine /hip provocative exam maneuvers  Straight leg raise positive on right, negative on left  Slump-sit test positive on right, negative on left  FADIR test negative bilaterally     SI joint " tests  JENNIFER test negative bilaterally     Key points for the international standards for neurological classification of spinal cord injury (ISNCSCI) to light touch.   Dermatome R L   L2 2 2   L3 2 2   L4 1 2   L5 1 2   S1 1 1   S2 2 2         Motor Exam Lower Extremities  ? Myotome R L   Hip flexion L2 5 5   Knee extension L3 5 5   Ankle dorsiflexion L4 5 5   Toe extension L5 5 5   Ankle plantarflexion S1 5 5         Previous exam  Bilateral hip abductor 4-/5    Thigh thrust test negative bilaterally  SI joint compression negative bilaterally  SI joint distraction negative bilaterally  Sacral thrust test negative bilaterally  Gaenslens maneuver negative bilaterally      Reflexes  ?   R L   Patella   2+ 2+   Achilles    2+ 2+      Clonus of the ankle negative bilaterally       MEDICAL DECISION MAKING    Medical records review: see under HPI section.     DATA    Labs: Personally reviewed at today's visit    Lab Results   Component Value Date/Time    SODIUM 140 03/22/2022 03:25 PM    POTASSIUM 4.5 03/22/2022 03:25 PM    CHLORIDE 103 03/22/2022 03:25 PM    CO2 27 03/22/2022 03:25 PM    ANION 10.0 03/22/2022 03:25 PM    GLUCOSE 98 03/22/2022 03:25 PM    BUN 24 (H) 03/22/2022 03:25 PM    CREATININE 0.62 03/22/2022 03:25 PM    CALCIUM 9.2 03/22/2022 03:25 PM    ASTSGOT 21 03/22/2022 03:25 PM    ALTSGPT 21 03/22/2022 03:25 PM    TBILIRUBIN 0.5 03/22/2022 03:25 PM    ALBUMIN 4.5 03/22/2022 03:25 PM    TOTPROTEIN 6.6 03/22/2022 03:25 PM    GLOBULIN 2.1 03/22/2022 03:25 PM    AGRATIO 2.1 03/22/2022 03:25 PM       Lab Results   Component Value Date/Time    PROTHROMBTM 15.2 (H) 03/22/2022 03:25 PM    INR 1.23 (H) 03/22/2022 03:25 PM        Lab Results   Component Value Date/Time    WBC 6.4 03/22/2022 03:25 PM    RBC 4.73 03/22/2022 03:25 PM    HEMOGLOBIN 14.9 03/22/2022 03:25 PM    HEMATOCRIT 45.4 03/22/2022 03:25 PM    MCV 96.0 03/22/2022 03:25 PM    MCH 31.5 03/22/2022 03:25 PM    MCHC 32.8 (L) 03/22/2022 03:25 PM    MPV 11.2  03/22/2022 03:25 PM    NEUTSPOLYS 69.00 03/22/2022 03:25 PM    LYMPHOCYTES 19.20 (L) 03/22/2022 03:25 PM    MONOCYTES 6.90 03/22/2022 03:25 PM    EOSINOPHILS 3.80 03/22/2022 03:25 PM    BASOPHILS 0.80 03/22/2022 03:25 PM        Lab Results   Component Value Date/Time    HBA1C 5.5 05/16/2016 07:50 AM        Imaging:   I personally reviewed following images, these are my reads  MRI lumbar spine 3/31/22  Moderate-severe central stenosis at L3-L4 with moderate neuroforaminal stenosis bilaterally. Mild bilatearl neuroforaminal stenosis at L4-L5 and L5-S1. Facet arthropathy worst at bilateral L3-L4, L4-L5, and L5-S1. Grade 1 anterolisthesis of L4 on L5.     MRI lumbar spine 5/26/20  Severe bilateral facet arthropathy at L4-L5. Moderate-severe facet arthropathy at bilateral L3-L4. Grade 1 anterolisthesis of L4 on L5. Mod-severe right neuroforaminal stenosis at L3-L4, mild left neuroforaminal stenosis at L3-L4. Moderate central stenosis at L3-4.               IMAGING radiology reads. I reviewed the following radiology reads                      Results for orders placed during the hospital encounter of 03/31/22    MR-LUMBAR SPINE-W/O    Impression  Interval worsening L3/4 central protrusion results in worsening moderate to severe central canal and moderate left foraminal stenoses    Mild worsening T12/L1 degenerative disc disease resulting in new mild central stenosis    Otherwise stable multilevel degenerative change resulting in foraminal predominate stenoses as detailed above    Stable L4/5 grade 1 anterolisthesis of secondary to severe facet arthropathy. Stable minimal degenerative retrolisthesis of the upper lumbar levels    Mature L5/S1 interbody fusion                 Results for orders placed during the hospital encounter of 05/26/20     MR-LUMBAR SPINE-W/O     Impression  Interval worsening L3/4 central protrusion results in worsening moderate central stenosis     Otherwise stable multilevel degenerative change  resulting in foraminal predominate stenoses, greatest is moderate to severe on the right at L3/4.     Lesser stenoses at other levels as detailed above     Stable L4/5 grade 1 anterolisthesis of secondary to severe facet arthropathy. Stable minimal degenerative retrolisthesis of the upper lumbar levels     Mature L5/S1 interbody fusion      Results for orders placed during the hospital encounter of 16     DX-LUMBAR SPINE-2 OR 3 VIEWS     Impression  1.  There has been progression of mild diffuse degenerative disc disease and facet disease throughout the entire lumbar spine.  2.  There are degenerative areas of anterolisthesis and retrolisthesis as noted above.      Results for orders placed during the hospital encounter of 20     DX-LUMBAR SPINE-4+ VIEWS     Impression  1.  No compression deformity or acute fracture is identified.     2.  There is grade 2 anterolisthesis now identified at L4-L5 which is significantly increased compared to the prior exam. There appears to be L4 spondylolysis.     3.  Some interval increase in degenerative disc disease and facet arthropathy.     4.  No focal instability noted on flexion extension views.         Diagnosis  Visit Diagnoses     ICD-10-CM   1. Lumbar spondylosis  M47.816   2. Chronic bilateral low back pain with right-sided sciatica  M54.41    G89.29   3. Spinal stenosis of lumbar region, unspecified whether neurogenic claudication present  M48.061   4. Rheumatoid arthritis involving multiple sites with positive rheumatoid factor (HCC)  M05.79   5. Neuropathy  G62.9   6. Numbness and tingling of right leg  R20.0    R20.2   7. Lumbar radiculopathy  M54.16         ASSESSMENT AND PLAN:  Marry Mathur (: 1935) is a female with history of osteoporosis, protein S deficiency, left TKA, left CARLYLE, RA, HLD, HTN, GERD, DVT on chronic Xarelto, and neuropathy in bilateral feet who presents with progressively worsening bilateral low back pain with pos  bilateral lumbar facet loading maneuver suggestive of lumbar facetogenic pain, and progressively worsening pain radiating down her right leg and pain-limited right leg weakness consistent with lumbar radiculopathy likely 2/2 neuroforaminal stenosis at right L3-L4 and right L4-L5 on MRI 3/31/22. Her worst pain is in her right low back radiating down her right leg. Given dermatomal distribution of pain covering L4 and L5 dermatomes with imaging notable for worst pathology at L3-L4 likely contributing to patient's symptoms, will plan for ILESI instead of TFESI to enhance spread of medication to multiple levels.    She endorses worsening pain with lumbar extension improved with lumbar flexion. Denies left leg symptoms. Lumbar spinal stenosis with neurogenic claudication is in the differential although I feel it is less likely to be the main etiology of her symptoms at this time.     Marry was seen today for follow-up.    Diagnoses and all orders for this visit:    Lumbar spondylosis    Chronic bilateral low back pain with right-sided sciatica    Spinal stenosis of lumbar region, unspecified whether neurogenic claudication present    Rheumatoid arthritis involving multiple sites with positive rheumatoid factor (HCC)    Neuropathy    Numbness and tingling of right leg    Lumbar radiculopathy          PLAN  Physical Therapy: continue PT.      Diagnostic workup: no new imaging needed at this time. Personally reviewed at today's visit and discussed with patient: MRI lumbar spine 3/31/22     Medications:  - continue Gabapentin 300mg QHS  - continue Tylenol 500mg BID  - NSAIDs contraindicated due to being on chronic anticoagulation for protein S deficiency and hx of DVT     Interventions:  - right Lumbar L4-5 interlaminar epidural steroid injection under fluoroscopic guidance. The risks, benefits, and alternatives to this procedure were discussed and the patient wishes to proceed with the procedure. Risks include but are not  limited to damage to surrounding structures, infection, bleeding, worsening of pain which can be permanent, and weakness which can be permanent. Benefits include pain relief and improved function. Alternatives include not doing the procedure.    - Would need to hold Xarelto for an interventional procedure - plan to ask pt's most recent cardiologist Dr. Darron Van     Outside records requested:  reviewed outside medical records from [facility] today - scanned into media    Follow-up: 3-4 weeks after procedure    Indira Lee MD  Interventional Pain and Spine  Physical Medicine and Rehabilitation  [facility]      The above note documents my personal evaluation of this patient. In addition, I have reviewed and confirmed with the patient and MA the supportive information documented in today's Patient Health Questionnaire and Office Note.     Please note that this dictation was created using voice recognition software. I have made every reasonable attempt to correct obvious errors, but I expect that there are errors of grammar and possibly content that I did not discover before finalizing the note.

## 2022-04-23 NOTE — PROGRESS NOTES
Subjective:   Chief Complaint:   Chief Complaint   Patient presents with   • Aortic Stenosis     F/V Dx: Moderate aortic stenosis     • Hypertension     F/V Dx: Essential hypertension, benign   • Hyperlipidemia       Marry Mathur is a 86 y.o. female who returns for aortic stenosis, HLP, chronic anticoagulation for recurrent DVT, RBBB/LPFB.    Was having surgery, anesthesia found the heart murmur, referred to Dr. Olivera, last seen by Dr. Olivera, 10-5-21, note in [facility], moderate AS.  Last seen by Dr. Van 3-4-22 for preop assessment.  Previous records have indicated a diagnosis of atrial fibrillation, she adamantly denies ever being diagnosed with atrial fibrillation.  There is absolutely no mention about atrial fibrillation from Dr. Olivera's note.  I suspect A. fib came into her record inadvertently related to her blood center.    Has RBBB, LPFB.    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    Has HLP, on statin.  LDL 78 in 2021.    Has RA, on Plaquenil, sees Dr. Jordan.    Brother had a stent placed in his heart in his early 60s.    No prior hypertension.  No prior smoking history.  No history of diabetes. Prior IFG, resolved with weight loss  No chronic kidney disease.  No ETOH overuse.  No caffeine overuse.  No recreation substance use.  No hx asthma.    Has early macular degeneration.     Limited by back pain, using a cane.  Gets epidurals, stops her blood thinner on her own at times.  Loves to garden but struggling now.    Not limited by chest pain, pressure or tightness with activity.   No significant dyspnea on exertion, orthopnea or lower extremity swelling.   No significant palpitations, lightheadedness, or presyncope/syncope.   No symptoms of leg claudication.   No stroke/TIA like  "symptoms.    Lives alone with her dog.    Neighbors are her helpers.  Her son Nando lives in [place].    DATA REVIEWED by me:  ECG (my personal interpretation) 3-22-22  Sinus, 69, RBBB, LPFB    Echo 4-9-21  Compared to the images of the study done 04- there has been no   significant change.  Left ventricular ejection fraction is visually estimated to be 65%.  Moderate aortic stenosis.  Unable to estimate pulmonary artery pressure due to an inadequate   tricuspid regurgitant jet.  Aortic Valve  Tricuspid aortic valve. Moderate aortic stenosis. Trace aortic   insufficiency. Vmax is 3.19  m/s. Transvalvular gradients are - Peak:    40 mmHg, Mean: 21 mmHg.    Most recent labs:       Lab Results   Component Value Date/Time    WBC 6.4 03/22/2022 03:25 PM    HEMOGLOBIN 14.9 03/22/2022 03:25 PM    HEMATOCRIT 45.4 03/22/2022 03:25 PM    MCV 96.0 03/22/2022 03:25 PM    INR 1.23 (H) 03/22/2022 03:25 PM      Lab Results   Component Value Date/Time    SODIUM 140 03/22/2022 03:25 PM    POTASSIUM 4.5 03/22/2022 03:25 PM    CHLORIDE 103 03/22/2022 03:25 PM    CO2 27 03/22/2022 03:25 PM    GLUCOSE 98 03/22/2022 03:25 PM    BUN 24 (H) 03/22/2022 03:25 PM    CREATININE 0.62 03/22/2022 03:25 PM      Lab Results   Component Value Date/Time    ASTSGOT 21 03/22/2022 03:25 PM    ALTSGPT 21 03/22/2022 03:25 PM    ALBUMIN 4.5 03/22/2022 03:25 PM      Lab Results   Component Value Date/Time    CHOLSTRLTOT 147 09/28/2021 09:20 AM    LDL 78 09/28/2021 09:20 AM    HDL 51 09/28/2021 09:20 AM    TRIGLYCERIDE 92 09/28/2021 09:20 AM     No results for input(s): NTPROBNP, TROPONINT in the last 72 hours.      Past Medical History:   Diagnosis Date   • Adenocarcinoma of colon (HCC) 10/30/2018    From sessile serrated polyp 10/2018   • Anesthesia     pt states \"one new dr scraped my esophagus when they put the tube in and I started bleeding so they couldn't operate\"    • Atrial fibrillation [I48.91] 4/19/2016     pt denies    • Back pain 6/27/2012   • " Blood clotting disorder (HCC) 2012    clot in leg   • Bowel habit changes     constipation    • Cancer (HCC)     skin, colon 2018   • Cataract     belia IOL    • Chronic back pain greater than 3 months duration    • Deep vein thrombosis (HCC) 3/8/2016    First occurrence in [facility] in late 1970s Second occurrence further up in [facility] in 2012, has been on AC since    • Essential hypertension, benign 6/27/2012   • GERD (gastroesophageal reflux disease) 6/27/2012   • Heart murmur    • Hyperlipidemia    • Hypertension     hx of, not currently    • Impaired fasting glucose 11/2/2017   • Mild aortic stenosis 11/2/2017   • Other and unspecified hyperlipidemia 6/27/2012   • Prediabetes    • Protein S deficiency (HCC) 11/2/2017    Noted in lab work 2013 as a part of work up at [facility]    • Rheumatoid arthritis involving multiple sites with positive rheumatoid factor (HCC) 03/14/2016    Dr. Escoto   • Rheumatoid nodule (HCC) 7/25/2017   • Right bundle branch block 6/27/2012    pt denies    • Urinary incontinence 11/2/2017     Past Surgical History:   Procedure Laterality Date   • WIDE EXCISION, LESION, HEAD AND NECK REGION Left 3/29/2022    Procedure: WIDE EXCISION, LESION, HEAD AND NECK REGION - RADICAL MALIGNANT MELANOMA OF LEFT CHEST;  Surgeon: Bernardino Torres M.D.;  Location: SURGERY SAME DAY [facility];  Service: General   • LUMBAR MEDIAL BRANCH BLOCKS Bilateral 12/15/2020    Procedure: BLOCK, NERVE, SPINAL, LUMBAR, POSTERIOR RAMUS, MEDIAL BRANCH;  Surgeon: Chris Cooper M.D.;  Location: [facility];  Service: Pain Management   • IRRIGATION & DEBRIDEMENT ORTHO Bilateral 7/31/2020    Procedure: IRRIGATION AND DEBRIDEMENT, WOUND - KNEE;  Surgeon: Roosevelt Saucedo M.D.;  Location: SURG[facility];  Service: Orthopedics   • HEMICOLECTOMY Right 12/13/2018    Procedure: OPEN RIGHT HEMICOLECTOMY;  Surgeon: Dash Bhagat M.D.;  Location: SURGERY [facility];  Service: General   • INGUINAL  HERNIA REPAIR Right 9/23/2016    Procedure: INGUINAL HERNIA REPAIR - PRIMARY;  Surgeon: Mary Medina M.D.;  Location: SURGERY [facility];  Service:    • OTHER  08/12/2014    peroneal nerve surgery Dr. Castro    • OTHER ORTHOPEDIC SURGERY  2014    left knee partial   • OTHER ORTHOPEDIC SURGERY  2011    meniscus repair   • ARTHROPLASTY      partial TKA with Dr. Persaud   • CHOLECYSTECTOMY     • HYSTERECTOMY, TOTAL ABDOMINAL  1970's   • ROTATOR CUFF REPAIR Bilateral      Family History   Problem Relation Age of Onset   • Cancer Mother         stomach   • Cancer Father         colon   • Leukemia Father         many exposure, worked for [employer]    • Heart Disease Brother         s/p stent      Social History     Socioeconomic History   • Marital status:      Spouse name: Not on file   • Number of children: Not on file   • Years of education: Not on file   • Highest education level: Not on file   Occupational History   • Not on file   Tobacco Use   • Smoking status: Never Smoker   • Smokeless tobacco: Never Used   Vaping Use   • Vaping Use: Never used   Substance and Sexual Activity   • Alcohol use: Yes     Comment: very rare   • Drug use: No   • Sexual activity: Not Currently     Partners: Male     Comment:     Other Topics Concern   • Not on file   Social History Narrative    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    "  • Calcium Carbonate (CALCIUM 600 PO) Take 300 mg by mouth.     • SODIUM FLUORIDE 5000 PLUS 1.1 % Cream BRUSH WITH PEA SIZED AMOUNT EVERY DAY PREFERABLY BEFORE BEDTIME. SPIT OUT ALL EXCESS. DO NOT RINSE     • hydroxychloroquine (PLAQUENIL) 200 MG Tab 11/2 tablet p.o. daily (Patient taking differently: Take 200 mg by mouth every day.) 135 Tablet 0   • omeprazole (PRILOSEC) 20 MG delayed-release capsule Take 1 Capsule by mouth every day. 90 Capsule 3   • lidocaine (LIDODERM) 5 % Patch APPLY ONE PATCH TO CLEAN DRY SKIN AS DIRECTED DAILY 90 Patch 1   • denosumab (PROLIA) 60 MG/ML Solution Prefilled Syringe injection Inject 60 mg under the skin every 6 months.     • Polyethylene Glycol 3350 (MIRALAX PO) Take  by mouth every day at 6 PM. 1 teaspoon daily     • Magnesium 400 MG Cap every day.     • B Complex Vitamins (B COMPLEX B-12 PO) Take  by mouth.     • gabapentin (NEURONTIN) 300 MG Cap 1 tab p.o. nightly 90 Capsule 0   • rivaroxaban (XARELTO) 20 MG Tab tablet Take 1 Tablet by mouth with dinner. 90 Tablet 1   • atorvastatin (LIPITOR) 10 MG Tab Take 1 tablet by mouth every evening. 90 tablet 3   • Potassium 99 MG Tab Take 99 mg by mouth every day.     • diphenhydrAMINE-APAP, sleep,  MG Tab Take 2 Tablets by mouth at bedtime.     • acetaminophen (TYLENOL) 500 MG Tab Take 500 mg by mouth 2 times a day.     • Melatonin 10 MG Tab Take 10 mg by mouth at bedtime.     • vitamin D (CHOLECALCIFEROL) 1000 UNIT Tab Take 1,000 Units by mouth every morning.       No current facility-administered medications for this visit.       ROS  All others systems reviewed and negative.     Objective:     /88 (BP Location: Left arm, Patient Position: Sitting, BP Cuff Size: Adult)   Pulse 72   Resp 18   Ht 1.6 m (5' 3\")   Wt 69.4 kg (153 lb)   SpO2 96%  Body mass index is 27.1 kg/m².    General: No acute distress. Well nourished.  HEENT: EOM grossly intact, no scleral icterus, no pharyngeal erythema.   Neck:  No JVD at 90, no " bruits, trachea midline  CVS: RRR.  4-6 late peaking systolic murmur radiating to the carotids on the apex, diminished S2.  Trace LE edema.  2+ radial pulse right, 1+ left, 2+ PT pulses  Resp: CTAB. No wheezing or crackles/rhonchi. Normal respiratory effort.  Abdomen: Soft, NT, no christin hepatomegaly.  MSK/Ext: No clubbing or cyanosis.  Skin: Warm and dry, no rashes.  Neurological: CN III-XII grossly intact. No focal deficits. Uses a cane.  Psych: A&O x 3, appropriate affect, good judgement        Assessment:     1. Nonrheumatic aortic valve stenosis  REFERRAL TO CARDIOLOGY   2. Family history of coronary artery disease in brother     3. Pure hypercholesterolemia     4. History of recurrent deep vein thrombosis (DVT)     5. Chronic anticoagulation     6. 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  She is getting her new echo on 5/23/2022 so I am go ahead and referring her to the TAVR team  -She would be a good candidate for TAVR, including left heart catheterization prior  -Does have a right bundle branch block with left posterior fascicular block increasing her risk of needing pacemaker after TAVR but we did discuss today  -On xarelto for recurrent DVT, not managed by us. She does stop on her own for spine injections for a few days without bridging  -LDL to goal, continue primary prevention atorvastatin  -Blood pressure controlled without medications  -Mildly diminished left radial pulse, she will have a CT scan evaluation prior and this can be monitored over time, murmur in her chest sounds more consistent with AS as opposed to subclavian stenosis  -Functional status mostly limited by spine  -There is no evidence of atrial fibrillation so we return to start that out of her records  -RTC with TAVR team and can see Dr. Rehan cancino  term      Written instructions given today:      - Stop taking fish oil completely.  There is an increased risk of bleeding and no benefit.    -Your atorvastatin is a powerful cholesterol medication but it is also a vascular anti-inflammatory medication which reduces the risk of heart attack and stroke significantly    -TAVR= Transcatheter Aortic Valve Replacement  This is a way to replace the aortic valve without open heart surgery.  It is still a major cardiac procedure with anesthesia but the recovery time is very fast.  You can learn more about this from YouTube.    We have a Valve Team which includes an interventional cardiologist, heart surgeon and Nurse Practitioners. I refer you to see them.  You will have further testing with typically includes left heart angiogram, CT scan and carotid ultrasound.    The Team:  Dr. Reymundo Li, Dr. Rudi Serrano, Interventional Cardiologists  Dr. Jez Liao, Cardiac surgeon  Reena Anguiano, Nurse practitioner  Rae Vernon RN, Valve coordinator      Return in about 6 months (around 10/26/2022).    It is my pleasure to participate in the care of Ms. Mathur.  Please do not hesitate to contact me with questions or concerns.    Rhona Rodney MD, [facility]  Cardiologist [facility]    Please note that this dictation was created using voice recognition software. I have made every reasonable attempt to correct obvious errors, but it is possible there are errors of grammar and possibly content that I did not discover before finalizing the note.

## 2022-04-26 ENCOUNTER — OFFICE VISIT (OUTPATIENT)
Dept: CARDIOLOGY | Facility: MEDICAL CENTER | Age: 87
End: 2022-04-26
Payer: MEDICARE

## 2022-04-26 VITALS
WEIGHT: 153 LBS | DIASTOLIC BLOOD PRESSURE: 88 MMHG | OXYGEN SATURATION: 96 % | RESPIRATION RATE: 18 BRPM | SYSTOLIC BLOOD PRESSURE: 144 MMHG | BODY MASS INDEX: 27.11 KG/M2 | HEIGHT: 63 IN | HEART RATE: 72 BPM

## 2022-04-26 DIAGNOSIS — Z79.01 CHRONIC ANTICOAGULATION: ICD-10-CM

## 2022-04-26 DIAGNOSIS — Z86.718 HISTORY OF RECURRENT DEEP VEIN THROMBOSIS (DVT): ICD-10-CM

## 2022-04-26 DIAGNOSIS — Z82.49 FAMILY HISTORY OF CORONARY ARTERY DISEASE IN BROTHER: ICD-10-CM

## 2022-04-26 DIAGNOSIS — I45.2 RIGHT BUNDLE BRANCH BLOCK (RBBB) WITH LEFT POSTERIOR FASCICULAR BLOCK (LPFB): ICD-10-CM

## 2022-04-26 DIAGNOSIS — I35.0 NONRHEUMATIC AORTIC VALVE STENOSIS: ICD-10-CM

## 2022-04-26 DIAGNOSIS — E78.00 PURE HYPERCHOLESTEROLEMIA: ICD-10-CM

## 2022-04-26 PROCEDURE — 99214 OFFICE O/P EST MOD 30 MIN: CPT | Performed by: INTERNAL MEDICINE

## 2022-04-26 ASSESSMENT — FIBROSIS 4 INDEX: FIB4 SCORE: 2.39

## 2022-04-26 NOTE — LETTER
[facility]   [address]  [place] [address]  Phone: [phone]  Fax: [phone]              Marry Mathur  1935    Encounter Date: 4/26/2022    Rhona Rodney M.D.          PROGRESS NOTE:  Subjective:   Chief Complaint:   Chief Complaint   Patient presents with   • Aortic Stenosis     F/V Dx: Moderate aortic stenosis     • Hypertension     F/V Dx: Essential hypertension, benign   • Hyperlipidemia       Marry Mathur is a 86 y.o. female who returns for aortic stenosis, HLP, chronic anticoagulation for recurrent DVT, RBBB/LPFB.    Was having surgery, anesthesia found the heart murmur, referred to Dr. Olivera, last seen by Dr. Olivera, 10-5-21, note in CareEverywhere, moderate AS.  Last seen by Dr. Van 3-4-22 for preop assessment.  Previous records have indicated a diagnosis of atrial fibrillation, she adamantly denies ever being diagnosed with atrial fibrillation.  There is absolutely no mention about atrial fibrillation from Dr. Olivera's note.  I suspect A. ortiz came into her record inadvertently related to her blood center.    Has RBBB, LPFB.    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    Has HLP, on statin.  LDL 78 in 2021.    Has RA, on Plaquenil, sees Dr. Jordan.    Brother had a stent placed in his heart in his early 60s.    No prior hypertension.  No prior smoking history.  No history of diabetes. Prior IFG, resolved with weight loss  No chronic kidney disease.  No ETOH overuse.  No caffeine overuse.  No recreation substance use.  No hx asthma.    Has early macular degeneration.     Limited by back pain, using a cane.  Gets epidurals, stops her blood thinner on her own at times.  Loves to garden but struggling now.    Not  limited by chest pain, pressure or tightness with activity.   No significant dyspnea on exertion, orthopnea or lower extremity swelling.   No significant palpitations, lightheadedness, or presyncope/syncope.   No symptoms of leg claudication.   No stroke/TIA like symptoms.    Lives alone with her dog.    Neighbors are her helpers.  Her son Nando lives in [place].    DATA REVIEWED by me:  ECG (my personal interpretation) 3-22-22  Sinus, 69, RBBB, LPFB    Echo 4-9-21  Compared to the images of the study done 04- there has been no   significant change.  Left ventricular ejection fraction is visually estimated to be 65%.  Moderate aortic stenosis.  Unable to estimate pulmonary artery pressure due to an inadequate   tricuspid regurgitant jet.  Aortic Valve  Tricuspid aortic valve. Moderate aortic stenosis. Trace aortic   insufficiency. Vmax is 3.19  m/s. Transvalvular gradients are - Peak:    40 mmHg, Mean: 21 mmHg.    Most recent labs:       Lab Results   Component Value Date/Time    WBC 6.4 03/22/2022 03:25 PM    HEMOGLOBIN 14.9 03/22/2022 03:25 PM    HEMATOCRIT 45.4 03/22/2022 03:25 PM    MCV 96.0 03/22/2022 03:25 PM    INR 1.23 (H) 03/22/2022 03:25 PM      Lab Results   Component Value Date/Time    SODIUM 140 03/22/2022 03:25 PM    POTASSIUM 4.5 03/22/2022 03:25 PM    CHLORIDE 103 03/22/2022 03:25 PM    CO2 27 03/22/2022 03:25 PM    GLUCOSE 98 03/22/2022 03:25 PM    BUN 24 (H) 03/22/2022 03:25 PM    CREATININE 0.62 03/22/2022 03:25 PM      Lab Results   Component Value Date/Time    ASTSGOT 21 03/22/2022 03:25 PM    ALTSGPT 21 03/22/2022 03:25 PM    ALBUMIN 4.5 03/22/2022 03:25 PM      Lab Results   Component Value Date/Time    CHOLSTRLTOT 147 09/28/2021 09:20 AM    LDL 78 09/28/2021 09:20 AM    HDL 51 09/28/2021 09:20 AM    TRIGLYCERIDE 92 09/28/2021 09:20 AM     No results for input(s): NTPROBNP, TROPONINT in the last 72 hours.      Past Medical History:   Diagnosis Date   • Adenocarcinoma of colon (HCC) 10/30/2018  "   From sessile serrated polyp 10/2018   • Anesthesia     pt states \"one new dr scraped my esophagus when they put the tube in and I started bleeding so they couldn't operate\"    • Atrial fibrillation [I48.91] 4/19/2016     pt denies    • Back pain 6/27/2012   • Blood clotting disorder (HCC) 2012    clot in leg   • Bowel habit changes     constipation    • Cancer (HCC)     skin, colon 2018   • Cataract     belia IOL    • Chronic back pain greater than 3 months duration    • Deep vein thrombosis (HCC) 3/8/2016    First occurrence in LLE in late 1970s Second occurrence further up in LLE in 2012, has been on AC since    • Essential hypertension, benign 6/27/2012   • GERD (gastroesophageal reflux disease) 6/27/2012   • Heart murmur    • Hyperlipidemia    • Hypertension     hx of, not currently    • Impaired fasting glucose 11/2/2017   • Mild aortic stenosis 11/2/2017   • Other and unspecified hyperlipidemia 6/27/2012   • Prediabetes    • Protein S deficiency (HCC) 11/2/2017    Noted in lab work 2013 as a part of work up at [facility]    • Rheumatoid arthritis involving multiple sites with positive rheumatoid factor (HCC) 03/14/2016    Dr. Escoto   • Rheumatoid nodule (HCC) 7/25/2017   • Right bundle branch block 6/27/2012    pt denies    • Urinary incontinence 11/2/2017     Past Surgical History:   Procedure Laterality Date   • WIDE EXCISION, LESION, HEAD AND NECK REGION Left 3/29/2022    Procedure: WIDE EXCISION, LESION, HEAD AND NECK REGION - RADICAL MALIGNANT MELANOMA OF LEFT CHEST;  Surgeon: Bernardino Torres M.D.;  Location: SURGERY SAME DAY [facility];  Service: General   • LUMBAR MEDIAL BRANCH BLOCKS Bilateral 12/15/2020    Procedure: BLOCK, NERVE, SPINAL, LUMBAR, POSTERIOR RAMUS, MEDIAL BRANCH;  Surgeon: Chris Cooper M.D.;  Location: SURGERY REHAB PAIN MANAGEMENT;  Service: Pain Management   • IRRIGATION & DEBRIDEMENT ORTHO Bilateral 7/31/2020    Procedure: IRRIGATION AND DEBRIDEMENT, WOUND - KNEE;  Surgeon: " Roosevelt Saucedo M.D.;  Location: SURGERY [facility];  Service: Orthopedics   • HEMICOLECTOMY Right 12/13/2018    Procedure: OPEN RIGHT HEMICOLECTOMY;  Surgeon: Dash Bhagat M.D.;  Location: SURGERY [facility];  Service: General   • INGUINAL HERNIA REPAIR Right 9/23/2016    Procedure: INGUINAL HERNIA REPAIR - PRIMARY;  Surgeon: Mary Medina M.D.;  Location: SURGERY [facility];  Service:    • OTHER  08/12/2014    peroneal nerve surgery Dr. Castro    • OTHER ORTHOPEDIC SURGERY  2014    left knee partial   • OTHER ORTHOPEDIC SURGERY  2011    meniscus repair   • ARTHROPLASTY      partial TKA with Dr. Persaud   • CHOLECYSTECTOMY     • HYSTERECTOMY, TOTAL ABDOMINAL  1970's   • ROTATOR CUFF REPAIR Bilateral      Family History   Problem Relation Age of Onset   • Cancer Mother         stomach   • Cancer Father         colon   • Leukemia Father         many exposure, worked for [employer]    • Heart Disease Brother         s/p stent      Social History     Socioeconomic History   • Marital status:      Spouse name: Not on file   • Number of children: Not on file   • Years of education: Not on file   • Highest education level: Not on file   Occupational History   • Not on file   Tobacco Use   • Smoking status: Never Smoker   • Smokeless tobacco: Never Used   Vaping Use   • Vaping Use: Never used   Substance and Sexual Activity   • Alcohol use: Yes     Comment: very rare   • Drug use: No   • Sexual activity: Not Currently     Partners: Male     Comment:     Other Topics Concern   • Not on file   Social History Narrative    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     • Calcium Carbonate (CALCIUM 600 PO) Take 300 mg by mouth.     • SODIUM FLUORIDE 5000 PLUS 1.1 % Cream BRUSH WITH PEA SIZED AMOUNT EVERY DAY PREFERABLY BEFORE BEDTIME. SPIT OUT ALL EXCESS. DO NOT RINSE     • hydroxychloroquine (PLAQUENIL) 200 MG Tab 11/2 tablet p.o. daily (Patient taking differently: Take 200 mg by mouth every day.) 135 Tablet 0   • omeprazole (PRILOSEC) 20 MG delayed-release capsule Take 1 Capsule by mouth every day. 90 Capsule 3   • lidocaine (LIDODERM) 5 % Patch APPLY ONE PATCH TO CLEAN DRY SKIN AS DIRECTED DAILY 90 Patch 1   • denosumab (PROLIA) 60 MG/ML Solution Prefilled Syringe injection Inject 60 mg under the skin every 6 months.     • Polyethylene Glycol 3350 (MIRALAX PO) Take  by mouth every day at 6 PM. 1 teaspoon daily     • Magnesium 400 MG Cap every day.     • B Complex Vitamins (B COMPLEX B-12 PO) Take  by mouth.     • gabapentin (NEURONTIN) 300 MG Cap 1 tab p.o. nightly 90 Capsule 0   • rivaroxaban (XARELTO) 20 MG Tab tablet Take 1 Tablet by mouth with dinner. 90 Tablet 1   • atorvastatin (LIPITOR) 10 MG Tab Take 1 tablet by mouth every evening. 90 tablet 3   • Potassium 99 MG Tab Take 99 mg by mouth every day.     • diphenhydrAMINE-APAP, sleep,  MG Tab Take 2 Tablets by mouth at bedtime.     • acetaminophen (TYLENOL) 500 MG Tab Take 500 mg by mouth 2 times a day.     • Melatonin 10 MG Tab Take 10 mg by mouth at bedtime.     • vitamin D (CHOLECALCIFEROL) 1000 UNIT Tab Take 1,000 Units by mouth every morning.       No current facility-administered medications for this visit.       ROS  All others systems reviewed and negative.     Objective:     /88 (BP Location: Left arm, Patient Position: Sitting, BP Cuff Size:  "Adult)   Pulse 72   Resp 18   Ht 1.6 m (5' 3\")   Wt 69.4 kg (153 lb)   SpO2 96%  Body mass index is 27.1 kg/m².    General: No acute distress. Well nourished.  HEENT: EOM grossly intact, no scleral icterus, no pharyngeal erythema.   Neck:  No JVD at 90, no bruits, trachea midline  CVS: RRR.  4-6 late peaking systolic murmur radiating to the carotids on the apex, diminished S2.  Trace LE edema.  2+ radial pulse right, 1+ left, 2+ PT pulses  Resp: CTAB. No wheezing or crackles/rhonchi. Normal respiratory effort.  Abdomen: Soft, NT, no christin hepatomegaly.  MSK/Ext: No clubbing or cyanosis.  Skin: Warm and dry, no rashes.  Neurological: CN III-XII grossly intact. No focal deficits. Uses a cane.  Psych: A&O x 3, appropriate affect, good judgement        Assessment:     1. Nonrheumatic aortic valve stenosis     2. Family history of coronary artery disease in brother     3. Pure hypercholesterolemia     4. History of recurrent deep vein thrombosis (DVT)     5. Chronic anticoagulation     6. 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  She is getting her new echo on 5/23/2022 so I am go ahead and referring her to the TAVR team  -She would be a good candidate for TAVR, including left heart catheterization prior  -Does have a right bundle branch block with left posterior fascicular block increasing her risk of needing pacemaker after TAVR but we did discuss today  -On xarelto for recurrent DVT, not managed by us. She does stop on her own for spine injections for a few days without bridging  -LDL to goal, continue primary prevention atorvastatin  -Blood pressure controlled without medications  -Mildly diminished left radial pulse, she will have a CT scan evaluation prior and this can be monitored over time, murmur in her chest sounds more consistent " with AS as opposed to subclavian stenosis  -Functional status mostly limited by spine  -There is no evidence of atrial fibrillation so we return to start that out of her records  -RTC with TAVR team and can see Dr. Van long term      Written instructions given today:      - Stop taking fish oil completely.  There is an increased risk of bleeding and no benefit.    -Your atorvastatin is a powerful cholesterol medication but it is also a vascular anti-inflammatory medication which reduces the risk of heart attack and stroke significantly    -TAVR= Transcatheter Aortic Valve Replacement  This is a way to replace the aortic valve without open heart surgery.  It is still a major cardiac procedure with anesthesia but the recovery time is very fast.  You can learn more about this from YouTube.    We have a Valve Team which includes an interventional cardiologist, heart surgeon and Nurse Practitioners. I refer you to see them.  You will have further testing with typically includes left heart angiogram, CT scan and carotid ultrasound.    The Team:  Dr. Reymundo Li, Dr. Rudi Serrano, Interventional Cardiologists  Dr. Jez Liao, Cardiac surgeon  Reena Anguiano, Nurse practitioner  Rae Vernon RN, Valve coordinator      Return in about 6 months (around 10/26/2022).    It is my pleasure to participate in the care of Ms. Mathur.  Please do not hesitate to contact me with questions or concerns.    Rhona Rodney MD, [facility]  Cardiologist [facility]    Please note that this dictation was created using voice recognition software. I have made every reasonable attempt to correct obvious errors, but it is possible there are errors of grammar and possibly content that I did not discover before finalizing the note.          No Recipients

## 2022-04-26 NOTE — PATIENT INSTRUCTIONS
- Stop taking fish oil completely.  There is an increased risk of bleeding and no benefit.    -Your atorvastatin is a powerful cholesterol medication but it is also a vascular anti-inflammatory medication which reduces the risk of heart attack and stroke significantly    -TAVR= Transcatheter Aortic Valve Replacement  This is a way to replace the aortic valve without open heart surgery.  It is still a major cardiac procedure with anesthesia but the recovery time is very fast.  You can learn more about this from YouTube.    We have a Valve Team which includes an interventional cardiologist, heart surgeon and Nurse Practitioners. I refer you to see them.  You will have further testing with typically includes left heart angiogram, CT scan and carotid ultrasound.    The Team:  Dr. Reymundo Li, Dr. Rudi Serrano, Interventional Cardiologists  Dr. Jez Liao, Cardiac surgeon  Reena Anguiano, Nurse practitioner  Rae Vernon RN, Valve coordinator

## 2022-05-02 ENCOUNTER — PATIENT MESSAGE (OUTPATIENT)
Dept: PHYSICAL MEDICINE AND REHAB | Facility: MEDICAL CENTER | Age: 87
End: 2022-05-02
Payer: MEDICARE

## 2022-05-02 ENCOUNTER — HOSPITAL ENCOUNTER (OUTPATIENT)
Dept: RADIOLOGY | Facility: MEDICAL CENTER | Age: 87
End: 2022-05-02
Attending: INTERNAL MEDICINE
Payer: MEDICARE

## 2022-05-03 ENCOUNTER — TELEPHONE (OUTPATIENT)
Dept: CARDIOLOGY | Facility: MEDICAL CENTER | Age: 87
End: 2022-05-03
Payer: MEDICARE

## 2022-05-03 NOTE — PATIENT COMMUNICATION
Sent clearance request form for Xarelto stoppage to pt cardiologist, Dr. Ramón Olivera. Sent by email and fax with request to have form returned asap. Fax confirmation in media.

## 2022-05-03 NOTE — TELEPHONE ENCOUNTER
Clearance request received from Dr. Lee from [facility] for pt's lumbar L4-5 interlaminar epidural steroid injection on 5/10/22. They are requesting that pt hold Xarelto for 3 days prior and restart 24 hours after. I will let them know that we do not manage Xarelto, PCP does. Fax clearance to [phone].     To LS, please advise on injection from cardiac stand point

## 2022-05-04 NOTE — TELEPHONE ENCOUNTER
Message  Received: Yesterday  Rhona Rodney M.D.  Katherine Arriaga R.N.  This is new.  Anyway, we do not manage her blood thinner because it is related to DVTs and not a cardiac condition.  The patient tells me that she manages her own Xarelto and stops appropriately per her instructions.  Thank you.             Previous Messages       ----- Message -----   From: Cesar Varela, Med Ass't   Sent: 5/3/2022   9:11 AM PDT   To: Rhona Rodney M.D.   -------------------------------------------------------------------------------------------------------------    Response sent back to Cesar Varela MA via his staff message attached to the letter they sent LS.

## 2022-05-09 ENCOUNTER — APPOINTMENT (RX ONLY)
Dept: URBAN - METROPOLITAN AREA CLINIC 4 | Facility: CLINIC | Age: 87
Setting detail: DERMATOLOGY
End: 2022-05-09

## 2022-05-09 DIAGNOSIS — D18.0 HEMANGIOMA: ICD-10-CM

## 2022-05-09 DIAGNOSIS — L82.0 INFLAMED SEBORRHEIC KERATOSIS: ICD-10-CM

## 2022-05-09 DIAGNOSIS — L81.4 OTHER MELANIN HYPERPIGMENTATION: ICD-10-CM

## 2022-05-09 DIAGNOSIS — L57.8 OTHER SKIN CHANGES DUE TO CHRONIC EXPOSURE TO NONIONIZING RADIATION: ICD-10-CM

## 2022-05-09 DIAGNOSIS — D22 MELANOCYTIC NEVI: ICD-10-CM

## 2022-05-09 DIAGNOSIS — Z86.006 PERSONAL HISTORY OF MELANOMA IN-SITU: ICD-10-CM

## 2022-05-09 DIAGNOSIS — Z85.828 PERSONAL HISTORY OF OTHER MALIGNANT NEOPLASM OF SKIN: ICD-10-CM

## 2022-05-09 DIAGNOSIS — L82.1 OTHER SEBORRHEIC KERATOSIS: ICD-10-CM

## 2022-05-09 DIAGNOSIS — L57.0 ACTINIC KERATOSIS: ICD-10-CM

## 2022-05-09 PROBLEM — D18.01 HEMANGIOMA OF SKIN AND SUBCUTANEOUS TISSUE: Status: ACTIVE | Noted: 2022-05-09

## 2022-05-09 PROBLEM — D48.5 NEOPLASM OF UNCERTAIN BEHAVIOR OF SKIN: Status: ACTIVE | Noted: 2022-05-09

## 2022-05-09 PROBLEM — D22.5 MELANOCYTIC NEVI OF TRUNK: Status: ACTIVE | Noted: 2022-05-09

## 2022-05-09 PROCEDURE — 17110 DESTRUCTION B9 LES UP TO 14: CPT | Mod: 59

## 2022-05-09 PROCEDURE — ? COUNSELING

## 2022-05-09 PROCEDURE — 99213 OFFICE O/P EST LOW 20 MIN: CPT | Mod: 25

## 2022-05-09 PROCEDURE — ? LIQUID NITROGEN

## 2022-05-09 PROCEDURE — 17004 DESTROY PREMAL LESIONS 15/>: CPT

## 2022-05-09 PROCEDURE — 11102 TANGNTL BX SKIN SINGLE LES: CPT | Mod: 59

## 2022-05-09 PROCEDURE — ? BIOPSY BY SHAVE METHOD

## 2022-05-09 ASSESSMENT — LOCATION SIMPLE DESCRIPTION DERM
LOCATION SIMPLE: RIGHT TEMPLE
LOCATION SIMPLE: LEFT CHEEK
LOCATION SIMPLE: SCALP
LOCATION SIMPLE: RIGHT ANTERIOR NECK
LOCATION SIMPLE: BACK
LOCATION SIMPLE: LEFT HAND
LOCATION SIMPLE: CHEST
LOCATION SIMPLE: LEFT UPPER EXTREMITY
LOCATION SIMPLE: CHEST
LOCATION SIMPLE: RIGHT HAND
LOCATION SIMPLE: LEFT TEMPLE
LOCATION SIMPLE: LEFT FOREHEAD
LOCATION SIMPLE: LEFT UPPER BACK
LOCATION SIMPLE: RIGHT UPPER EXTREMITY
LOCATION SIMPLE: RIGHT LOWER EXTREMITY
LOCATION SIMPLE: LEFT LOWER EXTREMITY
LOCATION SIMPLE: LEFT SCALP
LOCATION SIMPLE: RIGHT CHEEK

## 2022-05-09 ASSESSMENT — LOCATION DETAILED DESCRIPTION DERM
LOCATION DETAILED: UPPER STERNUM
LOCATION DETAILED: RIGHT DORSAL HAND
LOCATION DETAILED: LEFT MID BACK
LOCATION DETAILED: LEFT CENTRAL FRONTAL SCALP
LOCATION DETAILED: RIGHT SUPERIOR LATERAL MALAR CHEEK
LOCATION DETAILED: LEFT SUPERIOR UPPER BACK
LOCATION DETAILED: LEFT INFERIOR TEMPLE
LOCATION DETAILED: RIGHT SUPERIOR PARIETAL SCALP
LOCATION DETAILED: RIGHT UPPER BACK
LOCATION DETAILED: LEFT MEDIAL FRONTAL SCALP
LOCATION DETAILED: LEFT DORSAL FOREARM
LOCATION DETAILED: RIGHT DORSAL FOREARM
LOCATION DETAILED: RIGHT LATERAL SUPERIOR CHEST
LOCATION DETAILED: LEFT LATERAL SUPERIOR CHEST
LOCATION DETAILED: RIGHT CHEEK
LOCATION DETAILED: RIGHT MID TEMPLE
LOCATION DETAILED: LEFT UPPER BACK
LOCATION DETAILED: LEFT ANTERIOR THIGH
LOCATION DETAILED: LEFT SUPERIOR PARIETAL SCALP
LOCATION DETAILED: LEFT DORSAL HAND
LOCATION DETAILED: RIGHT INFERIOR ANTERIOR NECK
LOCATION DETAILED: RIGHT ANTERIOR THIGH
LOCATION DETAILED: LEFT FOREHEAD
LOCATION DETAILED: LEFT CHEEK

## 2022-05-09 ASSESSMENT — LOCATION ZONE DERM
LOCATION ZONE: ARM
LOCATION ZONE: HAND
LOCATION ZONE: TRUNK
LOCATION ZONE: TRUNK
LOCATION ZONE: LEG
LOCATION ZONE: FACE
LOCATION ZONE: NECK
LOCATION ZONE: SCALP

## 2022-05-09 NOTE — PROCEDURE: LIQUID NITROGEN
Detail Level: Simple
Duration Of Freeze Thaw-Cycle (Seconds): 3
Post-Care Instructions: I reviewed with the patient in detail post-care instructions. Patient is to wear sunprotection, and avoid picking at any of the treated lesions. Pt may apply Vaseline to crusted or scabbing areas.
Aperture Size (Optional): C
Show Applicator Variable?: Yes
Number Of Freeze-Thaw Cycles: 2 freeze-thaw cycles
Render Post-Care Instructions In Note?: no
Consent: The patient's consent was obtained including but not limited to risks of crusting, scabbing, blistering, scarring, darker or lighter pigmentary change, recurrence, incomplete removal and infection.
Medical Necessity Information: It is in your best interest to select a reason for this procedure from the list below. All of these items fulfill various CMS LCD requirements except the new and changing color options.
Number Of Freeze-Thaw Cycles: 1 freeze-thaw cycle
Detail Level: Detailed
Spray Paint Text: The liquid nitrogen was applied to the skin utilizing a spray paint frosting technique.
Medical Necessity Clause: This procedure was medically necessary because the lesions that were treated were:

## 2022-05-09 NOTE — HPI: EVALUATION OF SKIN LESION(S)
How Severe Are Your Spot(S)?: mild
Have Your Spot(S) Been Treated In The Past?: has not been treated
Hpi Title: Evaluation of Skin Lesions
Location: Lt.lateral superior chest
Year Removed: 2022

## 2022-05-10 ENCOUNTER — HOSPITAL ENCOUNTER (OUTPATIENT)
Facility: REHABILITATION | Age: 87
End: 2022-05-10
Attending: STUDENT IN AN ORGANIZED HEALTH CARE EDUCATION/TRAINING PROGRAM | Admitting: STUDENT IN AN ORGANIZED HEALTH CARE EDUCATION/TRAINING PROGRAM
Payer: MEDICARE

## 2022-05-10 ENCOUNTER — APPOINTMENT (OUTPATIENT)
Dept: RADIOLOGY | Facility: REHABILITATION | Age: 87
End: 2022-05-10
Attending: STUDENT IN AN ORGANIZED HEALTH CARE EDUCATION/TRAINING PROGRAM
Payer: MEDICARE

## 2022-05-10 VITALS
DIASTOLIC BLOOD PRESSURE: 88 MMHG | BODY MASS INDEX: 27.34 KG/M2 | HEART RATE: 74 BPM | TEMPERATURE: 99 F | RESPIRATION RATE: 16 BRPM | HEIGHT: 63 IN | WEIGHT: 154.32 LBS | OXYGEN SATURATION: 95 % | SYSTOLIC BLOOD PRESSURE: 162 MMHG

## 2022-05-10 PROCEDURE — 700111 HCHG RX REV CODE 636 W/ 250 OVERRIDE (IP)

## 2022-05-10 PROCEDURE — 62323 NJX INTERLAMINAR LMBR/SAC: CPT

## 2022-05-10 PROCEDURE — 700117 HCHG RX CONTRAST REV CODE 255

## 2022-05-10 RX ORDER — LIDOCAINE HYDROCHLORIDE 10 MG/ML
INJECTION, SOLUTION EPIDURAL; INFILTRATION; INTRACAUDAL; PERINEURAL
Status: COMPLETED
Start: 2022-05-10 | End: 2022-05-10

## 2022-05-10 RX ORDER — METHYLPREDNISOLONE ACETATE 80 MG/ML
INJECTION, SUSPENSION INTRA-ARTICULAR; INTRALESIONAL; INTRAMUSCULAR; SOFT TISSUE
Status: COMPLETED
Start: 2022-05-10 | End: 2022-05-10

## 2022-05-10 RX ADMIN — IOHEXOL 5 ML: 240 INJECTION, SOLUTION INTRATHECAL; INTRAVASCULAR; INTRAVENOUS; ORAL at 15:07

## 2022-05-10 RX ADMIN — LIDOCAINE HYDROCHLORIDE 10 ML: 10 INJECTION, SOLUTION EPIDURAL; INFILTRATION; INTRACAUDAL; PERINEURAL at 15:07

## 2022-05-10 RX ADMIN — METHYLPREDNISOLONE ACETATE 80 MG: 80 INJECTION, SUSPENSION INTRA-ARTICULAR; INTRALESIONAL; INTRAMUSCULAR; SOFT TISSUE at 15:07

## 2022-05-10 ASSESSMENT — FIBROSIS 4 INDEX: FIB4 SCORE: 2.39

## 2022-05-10 NOTE — H&P
"Physical Medicine & Rehab History & Physical Note    Date  5/10/2022    Primary Care Physician  CRISTINO Horner      Pre-Op Diagnosis Codes:     * Lumbar radiculopathy [M54.16]    HPI  This is a 86 y.o. female who presented with ongoing pain radiating from the right low back down the right leg. No new numbness, tingling, or weakness.     Past Medical History:   Diagnosis Date   • Adenocarcinoma of colon (HCC) 10/30/2018    From sessile serrated polyp 10/2018   • Anesthesia     pt states \"one new dr scraped my esophagus when they put the tube in and I started bleeding so they couldn't operate\"    • Atrial fibrillation [I48.91] 4/19/2016     pt denies    • Back pain 6/27/2012   • Blood clotting disorder (HCC) 2012    clot in leg   • Bowel habit changes     constipation    • Cancer (HCC)     skin, colon 2018   • Cataract     belia IOL    • Chronic back pain greater than 3 months duration    • Deep vein thrombosis (HCC) 3/8/2016    First occurrence in LLE in late 1970s Second occurrence further up in LLE in 2012, has been on AC since    • Essential hypertension, benign 6/27/2012   • GERD (gastroesophageal reflux disease) 6/27/2012   • Heart murmur    • Hyperlipidemia    • Hypertension     hx of, not currently    • Impaired fasting glucose 11/2/2017   • Mild aortic stenosis 11/2/2017   • Other and unspecified hyperlipidemia 6/27/2012   • Prediabetes    • Protein S deficiency (HCC) 11/2/2017    Noted in lab work 2013 as a part of work up at [facility]    • Rheumatoid arthritis involving multiple sites with positive rheumatoid factor (HCC) 03/14/2016    Dr. Escoto   • Rheumatoid nodule (HCC) 7/25/2017   • Right bundle branch block 6/27/2012    pt denies    • Urinary incontinence 11/2/2017       Past Surgical History:   Procedure Laterality Date   • WIDE EXCISION, LESION, HEAD AND NECK REGION Left 3/29/2022    Procedure: WIDE EXCISION, LESION, HEAD AND NECK REGION - RADICAL MALIGNANT MELANOMA OF LEFT " CHEST;  Surgeon: Bernardino Torres M.D.;  Location: SURGERY SAME DAY [facility];  Service: General   • LUMBAR MEDIAL BRANCH BLOCKS Bilateral 12/15/2020    Procedure: BLOCK, NERVE, SPINAL, LUMBAR, POSTERIOR RAMUS, MEDIAL BRANCH;  Surgeon: Chris Cooper M.D.;  Location: SURGERY REHAB PAIN MANAGEMENT;  Service: Pain Management   • IRRIGATION & DEBRIDEMENT ORTHO Bilateral 7/31/2020    Procedure: IRRIGATION AND DEBRIDEMENT, WOUND - KNEE;  Surgeon: Roosevelt Saucedo M.D.;  Location: SURGERY [facility];  Service: Orthopedics   • HEMICOLECTOMY Right 12/13/2018    Procedure: OPEN RIGHT HEMICOLECTOMY;  Surgeon: Dash Bhagat M.D.;  Location: SURGERY [facility];  Service: General   • INGUINAL HERNIA REPAIR Right 9/23/2016    Procedure: INGUINAL HERNIA REPAIR - PRIMARY;  Surgeon: Mary Medina M.D.;  Location: SURGERY [facility];  Service:    • OTHER  08/12/2014    peroneal nerve surgery Dr. Castro    • OTHER ORTHOPEDIC SURGERY  2014    left knee partial   • OTHER ORTHOPEDIC SURGERY  2011    meniscus repair   • ARTHROPLASTY      partial TKA with Dr. Persaud   • CHOLECYSTECTOMY     • HYSTERECTOMY, TOTAL ABDOMINAL  1970's   • ROTATOR CUFF REPAIR Bilateral        No current facility-administered medications for this encounter.       Social History     Socioeconomic History   • Marital status:      Spouse name: Not on file   • Number of children: Not on file   • Years of education: Not on file   • Highest education level: Not on file   Occupational History   • Not on file   Tobacco Use   • Smoking status: Never Smoker   • Smokeless tobacco: Never Used   Vaping Use   • Vaping Use: Never used   Substance and Sexual Activity   • Alcohol use: Yes     Comment: very rare   • Drug use: No   • Sexual activity: Not Currently     Partners: Male     Comment:     Other Topics Concern   • Not on file   Social History Narrative    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      Social  Determinants of Health     Financial Resource Strain: Not on file   Food Insecurity: Not on file   Transportation Needs: Not on file   Physical Activity: Not on file   Stress: Not on file   Social Connections: Not on file   Intimate Partner Violence: Not on file   Housing Stability: Not on file       Family History   Problem Relation Age of Onset   • Cancer Mother         stomach   • Cancer Father         colon   • Leukemia Father         many exposure, worked for [employer]    • Heart Disease Brother         s/p stent        Allergies  Wound dressing adhesive    Review of Systems  Negative except for HPI    Physical Exam  Gen: no acute distress  HEENT: NCAT, EOMI  Resp: breathing comfortably on RA  CV: Distal extremities warm and well perfused  Ab: nondistended  Neuro: A+O x 4  MSK: moving all extremities spontaneously    Vital Signs  Blood Pressure : 148/79   Temperature: 37.6 °C (99.6 °F)   Pulse: 74   Respiration: 16   Pulse Oximetry: 95 %       Labs:                    Radiology:  DX-PORTABLE FLUOROSCOPY < 1 HOUR    (Results Pending)         Assessment/Plan:  Pre-Op Diagnosis Codes:     * Lumbar radiculopathy [M54.16]  Procedure(s):  Lumbar L4-5 interlaminar epidural steroid injection     Ongoing pain radiating from right low back down right leg. No new numbness, tingling, or weakness. Would like to proceed with ILESI at L4-L5.    Indira Lee MD  Interventional Pain and Spine  Physical Medicine and Rehabilitation  [facility]

## 2022-05-10 NOTE — PROGRESS NOTES
1342:  Patient admitted to pre-op, health assessment complete, HX reviewed (HTN, GERD, Blood clots, aortic stenosis, colon CA, RA, hip and knee replacement), current medications reviewed with patient, see MAR, pt denies taking blood thinners (Xarelto) for 3 days.  Patient has a ride post procedure.  Printed and verbal discharge instructions given with patient understanding,   1500: hand-off given to Elena LARA.

## 2022-05-10 NOTE — PROGRESS NOTES
1520: Rec'd pt from OR, report rec'd from Elena, procedure RN, pt ambulatory to chair, VSS, tolerating liquids with no nausea.  Pain assessed.  Dressing CDI.  Ice pack placed to incision site.  Dr. Lee in to see patient.   1531: Patient ambulatory and meets D/C criteria.      Patient d/c'd to designated .

## 2022-05-10 NOTE — PROGRESS NOTES
1507 Preprocedure assessment completed.  Pertinent health information(Colon Cancer, HTN, GERD, Rheumatoid Arthritis, A Fib, Hx of DVT) communicated to physician and staff prior to time out.  Patient positioned preprocedure by RN, CSTand XRAY.  Foam wedge under ankles for support.

## 2022-05-10 NOTE — OP REPORT
Date of Service: 5/10/2022     Patient: Marry Mathur 86 y.o. female     MRN: [id number]     Physician/s: Indira Lee MD    Pre-operative Diagnosis: Lumbar radiculopathy    Post-operative Diagnosis: Lumbar radiculopathy    Procedure: Lumbar Epidural Steroid Injection at the right L4-L5 level.     Description of procedure:    The risks, benefits, and alternatives of the procedure were reviewed and discussed with the patient.  Written informed consent was freely obtained. A pre-procedural time-out was conducted by the physician verifying patient’s identity, procedure to be performed, procedure site and side, and allergy verification. Appropriate equipment was determined to be in place for the procedure.     The patient's vital signs were carefully monitored before, throughout, and after the procedure.     The patient was placed in the prone position, and fluoroscopy was used to identify the L4-L5 level.  The lumbar area was prepped with chlorhexidine solution and draped with sterile drape.  Sterile technique was used throughout.  At the needle entry point, the skin and subcutaneous tissues were infiltrated with 1% lidocaine.  A 20-gauge Tuohy needle was advanced to the epidural space, using the loss of resistance technique in a contralateral oblique fluoroscopic view. In the AP and lateral views, contrast dye was used to highlight the epidural space spread while the fluoroscope was running live. With the needle in the epidural space, aspiration was negative for blood or other fluid.  Methylprednisolone, 80 mg., lidocaine, 20 mg, and 1cc of 0.9% normal saline (total volume 4 ml.) were injected through the Tuohy needle.  The injected local anesthetic and steroid resulted in dispersion of the previously injected contrast. The needle was removed intact. The patient's back was covered with a 4x4 gauze, the area was cleansed with sterile normal saline, and a dressing was applied. There were no complications noted.      The procedure was well tolerated, and there were no apparent complications.      The patient was then evaluated post-procedure, and was hemodynamically stable prior to leaving the post-operative care unit.     Follow-up as scheduled    Indira Lee MD  Interventional Pain and Spine  Physical Medicine and Rehabilitation  [facility]      CPT codes  Interlaminar epidural injection - lumbar or sacral (caudal): 95679

## 2022-05-12 ENCOUNTER — TELEPHONE (OUTPATIENT)
Dept: PHYSICAL MEDICINE AND REHAB | Facility: MEDICAL CENTER | Age: 87
End: 2022-05-12
Payer: MEDICARE

## 2022-05-12 NOTE — TELEPHONE ENCOUNTER
"Pt says there is \"a little bit of improvement\" in her pain, but she is still having some pain in her right leg. Pt has been icing the area which has been helpful in her opinion. Pt denies bladder/bowel issues. Pt had no further questions. FV set for 5/31.  "

## 2022-05-22 NOTE — H&P (VIEW-ONLY)
Follow-up patient Note    Interventional Pain and Spine  Physiatry (Physical Medicine and Rehabilitation)     Patient Name: Marry Mathur  : 1935  Date of Service: 2022      Chief Complaint:   Chief Complaint   Patient presents with   • Follow-Up     Lumbar radiculopathy       HISTORY 3/17/22  Marry Mathur is a 86 y.o. female who presents today with constant low back pain and burning lateral right leg pain that started a couple of years ago with no known inciting event. States she has seen Dr. Cooper for multiple years, most recently 4 months ago, but feels that her pain continues to worsen so she seeks to reestablish care with a new pain provider.     Her pain at its best-worse level during the course of the day is 4-9/10, respectively. Pain right now is 7/10 on the numeric pain scale. Pain worsens with standing and walking and improves slightly with rest and sitting. Her pain interferes somewhat with ADLs. Pain interferes with her ability to work in her yard.  The patient denies new weakness, numbness, or bladder/bowel incontinence. Denies pain in her left leg. Endorses history of neuropathy in feet with no known inciting event. Had an EMG done with Dr. Booth which revealed a left peroneal axonal mononeuropathy per chart. Has hx of RA, feels that her RA symptoms are stable.     Notes that she is able to ambulate more easily by bending forward and using a shopping cart. Notes that propping her legs in a recliner improves her pain.     On xarelto. Seeing Dr. Rodney. Pt states she has been able to stop xarelto for procedures in the past.     The patient is currently going to physical therapy for this problem, most recently last week. Endorses improvement with dry needling lasting 12-24h.     Patient has tried the following medications with varied success (current meds in bold):   Gabapentin 300mg QHS  Tylenol 500mg BID     Also taking fish oil     Therapeutic modalities and  interventional therapies to date include:  -multiple per chart. Pt states the most helpful were bilateral 2-level facet joint injections with helped for a few months  - Lumbar epidural  - pt notes she has had multiple other injections that are not viewable in her [facility] chart     Medical history includes osteoporosis, protein S deficiency, left TKA, left CARLYLE, RA, HLD, HTN, GERD, DVT     Denies hx of back surgery.    Notes from outside provider [facility] reviewed. Indicating:  - L3-4 ILESI on 20  - right L3-4 ILESI on 10/29/2020 - 30% pain relief  - bilateral L4-5 and L5-S1 facet joint injections 12/15/2020 - 50-70% pain relief  - right L3-4 ILESI on 21 - 50% pain relief  - right L2-L4 MBB on 21 with significant pain relief, right L2-L4 MBB on 9/3/21 with significant pain relief, right L2-L4 RFA 10/15/21  One note mentions resolution of leg pain after procedure targeting the facet    HPI  Today's visit   Marry Mathur ( 1935) is a female with Diagnoses of Lumbar spondylosis, Chronic bilateral low back pain with right-sided sciatica, Spinal stenosis of lumbar region, unspecified whether neurogenic claudication present, Rheumatoid arthritis involving multiple sites with positive rheumatoid factor (HCC), Neuropathy, Numbness and tingling of right leg, and Lumbar radiculopathy were pertinent to this visit.    S/p 5/10/22 right Lumbar L4-5 interlaminar epidural steroid injection with 10-15% improvement in pain x 3 days.  Ongoing pain radiating from her right low back down her posterior lateral right leg to her calf.  Pain severity rates 5-8/10.  Numbness in the same distribution. She is unsure if this extends to her foot as she has chronic neuropathy in both feet.  Pain continues to limit her ability to perform ADLs and garden.    Ongoing difficulty with prolonged standing. Notes she is able to walk for about 20-30 minutes before needing to take a break.  Uses a  "shopping cart helps.    Getting a left axillary lymph node resection surgery on 6/7.    She continues to take gabapentin 300 milligrams nightly and Tylenol as needed.  Denies side effects.  Endorses ongoing pain limited weakness.  She continues to do physical therapy.    Pt denies new numbness, tingling, or weakness.    Procedure history:  - 5/10/22 right Lumbar L4-5 interlaminar epidural steroid injection under fluoroscopic guidance - 10-15% pain relief x 2-3 days      ROS:   Red Flags ROS:   Fever, Chills, Sweats: Denies  Involuntary Weight Loss: Denies  Bladder Incontinence: Denies  Bowel Incontinence: denies  Saddle Anesthesia: Denies    All other systems reviewed and negative.     PMHx:   Past Medical History:   Diagnosis Date   • Adenocarcinoma of colon (HCC) 10/30/2018    From sessile serrated polyp 10/2018   • Anesthesia     pt states \"one new dr scraped my esophagus when they put the tube in and I started bleeding so they couldn't operate\"    • Atrial fibrillation [I48.91] 4/19/2016     pt denies    • Back pain 6/27/2012   • Blood clotting disorder (HCC) 2012    clot in leg   • Bowel habit changes     constipation    • Cancer (HCC)     skin, colon 2018   • Cataract     belia IOL    • Chronic back pain greater than 3 months duration    • Deep vein thrombosis (HCC) 3/8/2016    First occurrence in LLE in late 1970s Second occurrence further up in LLE in 2012, has been on AC since    • Essential hypertension, benign 6/27/2012   • GERD (gastroesophageal reflux disease) 6/27/2012   • Heart murmur    • Hyperlipidemia    • Hypertension     hx of, not currently    • Impaired fasting glucose 11/2/2017   • Mild aortic stenosis 11/2/2017   • Other and unspecified hyperlipidemia 6/27/2012   • Prediabetes    • Protein S deficiency (HCC) 11/2/2017    Noted in lab work 2013 as a part of work up at [facility]    • Rheumatoid arthritis involving multiple sites with positive rheumatoid factor (HCC) 03/14/2016    Dr. Escoto "   • Rheumatoid nodule (HCC) 7/25/2017   • Right bundle branch block 6/27/2012    pt denies    • Urinary incontinence 11/2/2017       PSHx:   Past Surgical History:   Procedure Laterality Date   • BLOCK EPIDURAL STEROID INJECTION Right 5/10/2022    Procedure: Lumbar L4-5 interlaminar epidural steroid injection;  Surgeon: Indira Lee M.D.;  Location: [facility];  Service: Pain Management   • WIDE EXCISION, LESION, HEAD AND NECK REGION Left 3/29/2022    Procedure: WIDE EXCISION, LESION, HEAD AND NECK REGION - RADICAL MALIGNANT MELANOMA OF LEFT CHEST;  Surgeon: Bernardino Torres M.D.;  Location: SURGERY SAME DAY [facility];  Service: General   • LUMBAR MEDIAL BRANCH BLOCKS Bilateral 12/15/2020    Procedure: BLOCK, NERVE, SPINAL, LUMBAR, POSTERIOR RAMUS, MEDIAL BRANCH;  Surgeon: Chris Cooper M.D.;  Location: [facility];  Service: Pain Management   • IRRIGATION & DEBRIDEMENT ORTHO Bilateral 7/31/2020    Procedure: IRRIGATION AND DEBRIDEMENT, WOUND - KNEE;  Surgeon: Roosevelt Saucedo M.D.;  Location: SURGERY [facility];  Service: Orthopedics   • HEMICOLECTOMY Right 12/13/2018    Procedure: OPEN RIGHT HEMICOLECTOMY;  Surgeon: Dash Bhagat M.D.;  Location: SURGERY [facility];  Service: General   • INGUINAL HERNIA REPAIR Right 9/23/2016    Procedure: INGUINAL HERNIA REPAIR - PRIMARY;  Surgeon: Mary Medina M.D.;  Location: SURGERY [facility];  Service:    • OTHER  08/12/2014    peroneal nerve surgery Dr. Castro    • OTHER ORTHOPEDIC SURGERY  2014    left knee partial   • OTHER ORTHOPEDIC SURGERY  2011    meniscus repair   • ARTHROPLASTY      partial TKA with Dr. Persaud   • CHOLECYSTECTOMY     • HYSTERECTOMY, TOTAL ABDOMINAL  1970's   • ROTATOR CUFF REPAIR Bilateral        Family Hx:   Family History   Problem Relation Age of Onset   • Cancer Mother         stomach   • Cancer Father         colon   • Leukemia Father         many exposure, worked for [place]  government    • Heart Disease Brother         s/p stent        Social Hx:  Social History     Socioeconomic History   • Marital status:      Spouse name: Not on file   • Number of children: Not on file   • Years of education: Not on file   • Highest education level: Not on file   Occupational History   • Not on file   Tobacco Use   • Smoking status: Never Smoker   • Smokeless tobacco: Never Used   Vaping Use   • Vaping Use: Never used   Substance and Sexual Activity   • Alcohol use: Yes     Comment: very rare   • Drug use: No   • Sexual activity: Not Currently     Partners: Male     Comment:     Other Topics Concern   • Not on file   Social History Narrative    Retired from [employer] program      Social Determinants of Health     Financial Resource Strain: Not on file   Food Insecurity: Not on file   Transportation Needs: Not on file   Physical Activity: Not on file   Stress: Not on file   Social Connections: Not on file   Intimate Partner Violence: Not on file   Housing Stability: Not on file       Allergies:  Allergies   Allergen Reactions   • Wound Dressing Adhesive      Pt says band-aids cause skin reaction/rash       Medications: reviewed on epic.   Outpatient Medications Marked as Taking for the 5/26/22 encounter (Office Visit) with Indira Lee M.D.   Medication Sig Dispense Refill   • gabapentin (NEURONTIN) 100 MG Cap Take 4 Capsules by mouth at bedtime. 120 Capsule 2   • Multiple Vitamins-Minerals (PRESERVISION AREDS 2 PO) every day.     • Calcium Carbonate (CALCIUM 600 PO) Take 300 mg by mouth.     • SODIUM FLUORIDE 5000 PLUS 1.1 % Cream BRUSH WITH PEA SIZED AMOUNT EVERY DAY PREFERABLY BEFORE BEDTIME. SPIT OUT ALL EXCESS. DO NOT RINSE     • hydroxychloroquine (PLAQUENIL) 200 MG Tab 11/2 tablet p.o. daily (Patient taking differently: Take 200 mg by mouth every day.) 135 Tablet 0   • omeprazole (PRILOSEC) 20 MG delayed-release capsule Take 1 Capsule by  mouth every day. 90 Capsule 3   • lidocaine (LIDODERM) 5 % Patch APPLY ONE PATCH TO CLEAN DRY SKIN AS DIRECTED DAILY 90 Patch 1   • denosumab (PROLIA) 60 MG/ML Solution Prefilled Syringe injection Inject 60 mg under the skin every 6 months.     • Polyethylene Glycol 3350 (MIRALAX PO) Take  by mouth every day at 6 PM. 1 teaspoon daily     • Magnesium 400 MG Cap every day.     • B Complex Vitamins (B COMPLEX B-12 PO) Take  by mouth.     • rivaroxaban (XARELTO) 20 MG Tab tablet Take 1 Tablet by mouth with dinner. 90 Tablet 1   • atorvastatin (LIPITOR) 10 MG Tab Take 1 tablet by mouth every evening. 90 tablet 3   • Potassium 99 MG Tab Take 99 mg by mouth every day.     • diphenhydrAMINE-APAP, sleep,  MG Tab Take 2 Tablets by mouth at bedtime.     • acetaminophen (TYLENOL) 500 MG Tab Take 500 mg by mouth 2 times a day.     • Melatonin 10 MG Tab Take 10 mg by mouth at bedtime.     • vitamin D (CHOLECALCIFEROL) 1000 UNIT Tab Take 1,000 Units by mouth every morning.          Current Outpatient Medications on File Prior to Visit   Medication Sig Dispense Refill   • Multiple Vitamins-Minerals (PRESERVISION AREDS 2 PO) every day.     • Calcium Carbonate (CALCIUM 600 PO) Take 300 mg by mouth.     • SODIUM FLUORIDE 5000 PLUS 1.1 % Cream BRUSH WITH PEA SIZED AMOUNT EVERY DAY PREFERABLY BEFORE BEDTIME. SPIT OUT ALL EXCESS. DO NOT RINSE     • hydroxychloroquine (PLAQUENIL) 200 MG Tab 11/2 tablet p.o. daily (Patient taking differently: Take 200 mg by mouth every day.) 135 Tablet 0   • omeprazole (PRILOSEC) 20 MG delayed-release capsule Take 1 Capsule by mouth every day. 90 Capsule 3   • lidocaine (LIDODERM) 5 % Patch APPLY ONE PATCH TO CLEAN DRY SKIN AS DIRECTED DAILY 90 Patch 1   • denosumab (PROLIA) 60 MG/ML Solution Prefilled Syringe injection Inject 60 mg under the skin every 6 months.     • Polyethylene Glycol 3350 (MIRALAX PO) Take  by mouth every day at 6 PM. 1 teaspoon daily     • Magnesium 400 MG Cap every day.     •  "B Complex Vitamins (B COMPLEX B-12 PO) Take  by mouth.     • rivaroxaban (XARELTO) 20 MG Tab tablet Take 1 Tablet by mouth with dinner. 90 Tablet 1   • atorvastatin (LIPITOR) 10 MG Tab Take 1 tablet by mouth every evening. 90 tablet 3   • Potassium 99 MG Tab Take 99 mg by mouth every day.     • diphenhydrAMINE-APAP, sleep,  MG Tab Take 2 Tablets by mouth at bedtime.     • acetaminophen (TYLENOL) 500 MG Tab Take 500 mg by mouth 2 times a day.     • Melatonin 10 MG Tab Take 10 mg by mouth at bedtime.     • vitamin D (CHOLECALCIFEROL) 1000 UNIT Tab Take 1,000 Units by mouth every morning.       No current facility-administered medications on file prior to visit.         EXAMINATION     Physical Exam:   /76 (BP Location: Left arm, Patient Position: Sitting, BP Cuff Size: Adult)   Pulse 64   Temp 36.5 °C (97.7 °F) (Temporal)   Ht 1.6 m (5' 3\")   Wt 72.4 kg (159 lb 9.8 oz)   SpO2 95%     Constitutional:   Body Habitus: Body mass index is 28.27 kg/m².  Cooperation: Fully cooperates with exam  Appearance: Well-groomed, well-nourished.    Eyes: No scleral icterus to suggest severe liver disease, no proptosis to suggest severe hyperthyroidism    ENT -no obvious auditory deficits, no noticeable facial droop     Skin -no rashes or lesions noted     Respiratory-  breathing comfortably on room air, no audible wheezing    Cardiovascular-distal extremities warm and well perfused.  No lower extremity edema is noted.     Gastrointestinal - no obvious abdominal masses, non-distended    Psychiatric- alert and oriented ×3. Normal affect.     Gait - antalgic gait favoring right leg. Uses cane for balance.     Musculoskeletal and Neuro -      Thoracic/Lumbar Spine/Sacral Spine/Hips   Inspection: No evidence of atrophy in bilateral lower extremities throughout      ROM: limited active range of motion with lumbar flexion, lateral flexion, and rotation bilaterally.   There is limited active range of motion with lumbar " extension     Facet loading maneuver positive on left, neg on right     Palpation:   Tenderness to palpation over the midline of lumbosacral spine, paraspinal muscles bilaterally and lumbar facets bilaterally. No tenderness to palpation elsewhere in the low back/hips including sacroiliac joints bilaterally, PSIS bilaterally and greater trochanters bilaterally.     Lumbar spine /hip provocative exam maneuvers  Straight leg raise positive on right, negative on left  FADIR test negative bilaterally     SI joint tests  JENNIFER test negative bilaterally     Key points for the international standards for neurological classification of spinal cord injury (ISNCSCI) to light touch.   Dermatome R L   L2 2 2   L3 2 2   L4 1 2   L5 1 2   S1 1 1   S2 2 2         Motor Exam Lower Extremities  ? Myotome R L   Hip flexion L2 5 5   Knee extension L3 5 5   Ankle dorsiflexion L4 5 5   Toe extension L5 5 5   Ankle plantarflexion S1 5 5         Previous exam  Bilateral hip abductor 4-/5    Thigh thrust test negative bilaterally  SI joint compression negative bilaterally  SI joint distraction negative bilaterally  Sacral thrust test negative bilaterally  Gaenslens maneuver negative bilaterally      Reflexes  ?   R L   Patella   2+ 2+   Achilles    2+ 2+      Clonus of the ankle negative bilaterally       MEDICAL DECISION MAKING    Medical records review: see under HPI section.     DATA    Labs: Personally reviewed at today's visit    Lab Results   Component Value Date/Time    SODIUM 140 03/22/2022 03:25 PM    POTASSIUM 4.5 03/22/2022 03:25 PM    CHLORIDE 103 03/22/2022 03:25 PM    CO2 27 03/22/2022 03:25 PM    ANION 10.0 03/22/2022 03:25 PM    GLUCOSE 98 03/22/2022 03:25 PM    BUN 24 (H) 03/22/2022 03:25 PM    CREATININE 0.62 03/22/2022 03:25 PM    CALCIUM 9.2 03/22/2022 03:25 PM    ASTSGOT 21 03/22/2022 03:25 PM    ALTSGPT 21 03/22/2022 03:25 PM    TBILIRUBIN 0.5 03/22/2022 03:25 PM    ALBUMIN 4.5 03/22/2022 03:25 PM    TOTPROTEIN 6.6  03/22/2022 03:25 PM    GLOBULIN 2.1 03/22/2022 03:25 PM    AGRATIO 2.1 03/22/2022 03:25 PM       Lab Results   Component Value Date/Time    PROTHROMBTM 15.2 (H) 03/22/2022 03:25 PM    INR 1.23 (H) 03/22/2022 03:25 PM        Lab Results   Component Value Date/Time    WBC 6.4 03/22/2022 03:25 PM    RBC 4.73 03/22/2022 03:25 PM    HEMOGLOBIN 14.9 03/22/2022 03:25 PM    HEMATOCRIT 45.4 03/22/2022 03:25 PM    MCV 96.0 03/22/2022 03:25 PM    MCH 31.5 03/22/2022 03:25 PM    MCHC 32.8 (L) 03/22/2022 03:25 PM    MPV 11.2 03/22/2022 03:25 PM    NEUTSPOLYS 69.00 03/22/2022 03:25 PM    LYMPHOCYTES 19.20 (L) 03/22/2022 03:25 PM    MONOCYTES 6.90 03/22/2022 03:25 PM    EOSINOPHILS 3.80 03/22/2022 03:25 PM    BASOPHILS 0.80 03/22/2022 03:25 PM        Lab Results   Component Value Date/Time    HBA1C 5.5 05/16/2016 07:50 AM      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 Mild bilatearl neuroforaminal stenosis at L4-L5 and L5-S1. Facet arthropathy worst at bilateral L3-L4, L4-L5, and L5-S1. Grade 1 anterolisthesis of L4 on L5.     MRI lumbar spine 5/26/20  Severe bilateral facet arthropathy at L4-L5. Moderate-severe facet arthropathy at bilateral L3-L4. Grade 1 anterolisthesis of L4 on L5. Mod-severe right neuroforaminal stenosis at L3-L4, mild left neuroforaminal stenosis at L3-L4. Moderate central stenosis at L3-4.               IMAGING radiology reads. I reviewed the following radiology reads                      Results for orders placed during the hospital encounter of 03/31/22    MR-LUMBAR SPINE-W/O    Impression  Interval worsening L3/4 central protrusion results in worsening moderate to severe central canal and moderate left foraminal stenoses    Mild worsening T12/L1 degenerative disc disease resulting in new mild central  stenosis    Otherwise stable multilevel degenerative change resulting in foraminal predominate stenoses as detailed above    Stable L4/5 grade 1 anterolisthesis of secondary to severe facet arthropathy. Stable minimal degenerative retrolisthesis of the upper lumbar levels    Mature L5/S1 interbody fusion                 Results for orders placed during the hospital encounter of 05/26/20     MR-LUMBAR SPINE-W/O     Impression  Interval worsening L3/4 central protrusion results in worsening moderate central stenosis     Otherwise stable multilevel degenerative change resulting in foraminal predominate stenoses, greatest is moderate to severe on the right at L3/4.     Lesser stenoses at other levels as detailed above     Stable L4/5 grade 1 anterolisthesis of secondary to severe facet arthropathy. Stable minimal degenerative retrolisthesis of the upper lumbar levels     Mature L5/S1 interbody fusion      Results for orders placed during the hospital encounter of 11/01/16     DX-LUMBAR SPINE-2 OR 3 VIEWS     Impression  1.  There has been progression of mild diffuse degenerative disc disease and facet disease throughout the entire lumbar spine.  2.  There are degenerative areas of anterolisthesis and retrolisthesis as noted above.      Results for orders placed during the hospital encounter of 11/17/20     DX-LUMBAR SPINE-4+ VIEWS     Impression  1.  No compression deformity or acute fracture is identified.     2.  There is grade 2 anterolisthesis now identified at L4-L5 which is significantly increased compared to the prior exam. There appears to be L4 spondylolysis.     3.  Some interval increase in degenerative disc disease and facet arthropathy.     4.  No focal instability noted on flexion extension views.         Diagnosis  Visit Diagnoses     ICD-10-CM   1. Lumbar spondylosis  M47.816   2. Chronic bilateral low back pain with right-sided sciatica  M54.41    G89.29   3. Spinal stenosis of lumbar region,  unspecified whether neurogenic claudication present  M48.061   4. Rheumatoid arthritis involving multiple sites with positive rheumatoid factor (HCC)  M05.79   5. Neuropathy  G62.9   6. Numbness and tingling of right leg  R20.0    R20.2   7. Lumbar radiculopathy  M54.16         ASSESSMENT AND PLAN:  Marry Mathur (: 1935) is a female with history of osteoporosis, protein S deficiency, left TKA, left CARLYLE, RA, HLD, HTN, GERD, DVT on chronic Xarelto, and neuropathy in bilateral feet who presents with progressively ongoing severe low back pain radiating down her right leg and pain-limited right leg weakness consistent with lumbar radiculopathy likely 2/2 neuroforaminal stenosis at right L3-L4 and right L4-L5 on MRI 3/31/22. Her worst pain is in her right low back radiating down her right leg in the right L4 and L5 distribution.  Only partial slight relief with ILESI at right L4-L5.     Also has positive right-sided lumbar facet loading maneuver suggestive of lumbar facetogenic pain    She endorses worsening pain with lumbar extension improved with lumbar flexion. Denies left leg symptoms. Lumbar spinal stenosis with neurogenic claudication is in the differential although I feel it is less likely to be the main etiology of her symptoms at this time.     Marry was seen today for follow-up.    Diagnoses and all orders for this visit:    Lumbar spondylosis    Chronic bilateral low back pain with right-sided sciatica    Spinal stenosis of lumbar region, unspecified whether neurogenic claudication present    Rheumatoid arthritis involving multiple sites with positive rheumatoid factor (HCC)    Neuropathy    Numbness and tingling of right leg    Lumbar radiculopathy  -     Referral to Pain Clinic    Other orders  -     gabapentin (NEURONTIN) 100 MG Cap; Take 4 Capsules by mouth at bedtime.          PLAN  Physical Therapy: continue PT.      Diagnostic workup: no new imaging needed at this time. Again reviewed  at today's visit and discussed with patient: MRI lumbar spine 3/31/22     Medications:  - increase Gabapentin to 400mg QHS.  Counseled that she may experience side effect of drowsiness while your body is adjusting to the medicine and that if this interferes with her quality of life or ability to form ADLs, she should decrease it back to 300 mg nightly  - NSAIDs contraindicated due to being on chronic anticoagulation for protein S deficiency and hx of DVT     Interventions:  - right L4-L5 and L5-S1 transforaminal epidural steroid injection. The risks, benefits, and alternatives to this procedure were discussed and the patient wishes to proceed with the procedure. Risks include but are not limited to damage to surrounding structures, infection, bleeding, worsening of pain which can be permanent, and weakness which can be permanent. Benefits include pain relief and improved function. Alternatives include not doing the procedure.    - s/p right Lumbar L4-5 interlaminar epidural steroid injection under fluoroscopic guidance.   - requesting pt hold Xarelto for 3 days prior and restart 24 hours after - previously received clearance     Follow-up: 4 weeks after procedure    Indira Lee MD  Interventional Pain and Spine  Physical Medicine and Rehabilitation  [facility]    The above note documents my personal evaluation of this patient. In addition, I have reviewed and confirmed with the patient and MA the supportive information documented in today's Patient Health Questionnaire and Office Note.     Please note that this dictation was created using voice recognition software. I have made every reasonable attempt to correct obvious errors, but I expect that there are errors of grammar and possibly content that I did not discover before finalizing the note.

## 2022-05-22 NOTE — PROGRESS NOTES
Follow-up patient Note    Interventional Pain and Spine  Physiatry (Physical Medicine and Rehabilitation)     Patient Name: Marry Mathur  : 1935  Date of Service: 2022      Chief Complaint:   Chief Complaint   Patient presents with   • Follow-Up     Lumbar radiculopathy       HISTORY 3/17/22  Marry Mathur is a 86 y.o. female who presents today with constant low back pain and burning lateral right leg pain that started a couple of years ago with no known inciting event. States she has seen Dr. Cooper for multiple years, most recently 4 months ago, but feels that her pain continues to worsen so she seeks to reestablish care with a new pain provider.     Her pain at its best-worse level during the course of the day is 4-9/10, respectively. Pain right now is 7/10 on the numeric pain scale. Pain worsens with standing and walking and improves slightly with rest and sitting. Her pain interferes somewhat with ADLs. Pain interferes with her ability to work in her yard.  The patient denies new weakness, numbness, or bladder/bowel incontinence. Denies pain in her left leg. Endorses history of neuropathy in feet with no known inciting event. Had an EMG done with Dr. Booth which revealed a left peroneal axonal mononeuropathy per chart. Has hx of RA, feels that her RA symptoms are stable.     Notes that she is able to ambulate more easily by bending forward and using a shopping cart. Notes that propping her legs in a recliner improves her pain.     On xarelto. Seeing Dr. Rodney. Pt states she has been able to stop xarelto for procedures in the past.     The patient is currently going to physical therapy for this problem, most recently last week. Endorses improvement with dry needling lasting 12-24h.     Patient has tried the following medications with varied success (current meds in bold):   Gabapentin 300mg QHS  Tylenol 500mg BID     Also taking fish oil     Therapeutic modalities and  interventional therapies to date include:  -multiple per chart. Pt states the most helpful were bilateral 2-level facet joint injections with helped for a few months  - Lumbar epidural  - pt notes she has had multiple other injections that are not viewable in her [facility] chart     Medical history includes osteoporosis, protein S deficiency, left TKA, left CARLYLE, RA, HLD, HTN, GERD, DVT     Denies hx of back surgery.    Notes from outside provider [facility] reviewed. Indicating:  - L3-4 ILESI on 20  - right L3-4 ILESI on 10/29/2020 - 30% pain relief  - bilateral L4-5 and L5-S1 facet joint injections 12/15/2020 - 50-70% pain relief  - right L3-4 ILESI on 21 - 50% pain relief  - right L2-L4 MBB on 21 with significant pain relief, right L2-L4 MBB on 9/3/21 with significant pain relief, right L2-L4 RFA 10/15/21  One note mentions resolution of leg pain after procedure targeting the facet    HPI  Today's visit   Marry Mathur ( 1935) is a female with Diagnoses of Lumbar spondylosis, Chronic bilateral low back pain with right-sided sciatica, Spinal stenosis of lumbar region, unspecified whether neurogenic claudication present, Rheumatoid arthritis involving multiple sites with positive rheumatoid factor (HCC), Neuropathy, Numbness and tingling of right leg, and Lumbar radiculopathy were pertinent to this visit.    S/p 5/10/22 right Lumbar L4-5 interlaminar epidural steroid injection with 10-15% improvement in pain x 3 days.  Ongoing pain radiating from her right low back down her posterior lateral right leg to her calf.  Pain severity rates 5-8/10.  Numbness in the same distribution. She is unsure if this extends to her foot as she has chronic neuropathy in both feet.  Pain continues to limit her ability to perform ADLs and garden.    Ongoing difficulty with prolonged standing. Notes she is able to walk for about 20-30 minutes before needing to take a break.  Uses a  "shopping cart helps.    Getting a left axillary lymph node resection surgery on 6/7.    She continues to take gabapentin 300 milligrams nightly and Tylenol as needed.  Denies side effects.  Endorses ongoing pain limited weakness.  She continues to do physical therapy.    Pt denies new numbness, tingling, or weakness.    Procedure history:  - 5/10/22 right Lumbar L4-5 interlaminar epidural steroid injection under fluoroscopic guidance - 10-15% pain relief x 2-3 days      ROS:   Red Flags ROS:   Fever, Chills, Sweats: Denies  Involuntary Weight Loss: Denies  Bladder Incontinence: Denies  Bowel Incontinence: denies  Saddle Anesthesia: Denies    All other systems reviewed and negative.     PMHx:   Past Medical History:   Diagnosis Date   • Adenocarcinoma of colon (HCC) 10/30/2018    From sessile serrated polyp 10/2018   • Anesthesia     pt states \"one new dr scraped my esophagus when they put the tube in and I started bleeding so they couldn't operate\"    • Atrial fibrillation [I48.91] 4/19/2016     pt denies    • Back pain 6/27/2012   • Blood clotting disorder (HCC) 2012    clot in leg   • Bowel habit changes     constipation    • Cancer (HCC)     skin, colon 2018   • Cataract     belia IOL    • Chronic back pain greater than 3 months duration    • Deep vein thrombosis (HCC) 3/8/2016    First occurrence in LLE in late 1970s Second occurrence further up in LLE in 2012, has been on AC since    • Essential hypertension, benign 6/27/2012   • GERD (gastroesophageal reflux disease) 6/27/2012   • Heart murmur    • Hyperlipidemia    • Hypertension     hx of, not currently    • Impaired fasting glucose 11/2/2017   • Mild aortic stenosis 11/2/2017   • Other and unspecified hyperlipidemia 6/27/2012   • Prediabetes    • Protein S deficiency (HCC) 11/2/2017    Noted in lab work 2013 as a part of work up at [facility]    • Rheumatoid arthritis involving multiple sites with positive rheumatoid factor (HCC) 03/14/2016    Dr. Escoto "   • Rheumatoid nodule (HCC) 7/25/2017   • Right bundle branch block 6/27/2012    pt denies    • Urinary incontinence 11/2/2017       PSHx:   Past Surgical History:   Procedure Laterality Date   • BLOCK EPIDURAL STEROID INJECTION Right 5/10/2022    Procedure: Lumbar L4-5 interlaminar epidural steroid injection;  Surgeon: Indira Lee M.D.;  Location: [facility];  Service: Pain Management   • WIDE EXCISION, LESION, HEAD AND NECK REGION Left 3/29/2022    Procedure: WIDE EXCISION, LESION, HEAD AND NECK REGION - RADICAL MALIGNANT MELANOMA OF LEFT CHEST;  Surgeon: Bernardino Torres M.D.;  Location: SURGERY SAME DAY [facility];  Service: General   • LUMBAR MEDIAL BRANCH BLOCKS Bilateral 12/15/2020    Procedure: BLOCK, NERVE, SPINAL, LUMBAR, POSTERIOR RAMUS, MEDIAL BRANCH;  Surgeon: Chris Cooper M.D.;  Location: [facility];  Service: Pain Management   • IRRIGATION & DEBRIDEMENT ORTHO Bilateral 7/31/2020    Procedure: IRRIGATION AND DEBRIDEMENT, WOUND - KNEE;  Surgeon: Roosevelt Saucedo M.D.;  Location: SURGERY [facility];  Service: Orthopedics   • HEMICOLECTOMY Right 12/13/2018    Procedure: OPEN RIGHT HEMICOLECTOMY;  Surgeon: Dash Bhagat M.D.;  Location: SURGERY [facility];  Service: General   • INGUINAL HERNIA REPAIR Right 9/23/2016    Procedure: INGUINAL HERNIA REPAIR - PRIMARY;  Surgeon: Mary Medina M.D.;  Location: SURGERY [facility];  Service:    • OTHER  08/12/2014    peroneal nerve surgery Dr. Castro    • OTHER ORTHOPEDIC SURGERY  2014    left knee partial   • OTHER ORTHOPEDIC SURGERY  2011    meniscus repair   • ARTHROPLASTY      partial TKA with Dr. Persaud   • CHOLECYSTECTOMY     • HYSTERECTOMY, TOTAL ABDOMINAL  1970's   • ROTATOR CUFF REPAIR Bilateral        Family Hx:   Family History   Problem Relation Age of Onset   • Cancer Mother         stomach   • Cancer Father         colon   • Leukemia Father         many exposure, worked for [place]  government    • Heart Disease Brother         s/p stent        Social Hx:  Social History     Socioeconomic History   • Marital status:      Spouse name: Not on file   • Number of children: Not on file   • Years of education: Not on file   • Highest education level: Not on file   Occupational History   • Not on file   Tobacco Use   • Smoking status: Never Smoker   • Smokeless tobacco: Never Used   Vaping Use   • Vaping Use: Never used   Substance and Sexual Activity   • Alcohol use: Yes     Comment: very rare   • Drug use: No   • Sexual activity: Not Currently     Partners: Male     Comment:     Other Topics Concern   • Not on file   Social History Narrative    Retired from [employer]. [employer]inated [employer] program      Social Determinants of Health     Financial Resource Strain: Not on file   Food Insecurity: Not on file   Transportation Needs: Not on file   Physical Activity: Not on file   Stress: Not on file   Social Connections: Not on file   Intimate Partner Violence: Not on file   Housing Stability: Not on file       Allergies:  Allergies   Allergen Reactions   • Wound Dressing Adhesive      Pt says band-aids cause skin reaction/rash       Medications: reviewed on epic.   Outpatient Medications Marked as Taking for the 5/26/22 encounter (Office Visit) with Indira Lee M.D.   Medication Sig Dispense Refill   • gabapentin (NEURONTIN) 100 MG Cap Take 4 Capsules by mouth at bedtime. 120 Capsule 2   • Multiple Vitamins-Minerals (PRESERVISION AREDS 2 PO) every day.     • Calcium Carbonate (CALCIUM 600 PO) Take 300 mg by mouth.     • SODIUM FLUORIDE 5000 PLUS 1.1 % Cream BRUSH WITH PEA SIZED AMOUNT EVERY DAY PREFERABLY BEFORE BEDTIME. SPIT OUT ALL EXCESS. DO NOT RINSE     • hydroxychloroquine (PLAQUENIL) 200 MG Tab 11/2 tablet p.o. daily (Patient taking differently: Take 200 mg by mouth every day.) 135 Tablet 0   • omeprazole (PRILOSEC) 20 MG delayed-release capsule Take 1 Capsule by  mouth every day. 90 Capsule 3   • lidocaine (LIDODERM) 5 % Patch APPLY ONE PATCH TO CLEAN DRY SKIN AS DIRECTED DAILY 90 Patch 1   • denosumab (PROLIA) 60 MG/ML Solution Prefilled Syringe injection Inject 60 mg under the skin every 6 months.     • Polyethylene Glycol 3350 (MIRALAX PO) Take  by mouth every day at 6 PM. 1 teaspoon daily     • Magnesium 400 MG Cap every day.     • B Complex Vitamins (B COMPLEX B-12 PO) Take  by mouth.     • rivaroxaban (XARELTO) 20 MG Tab tablet Take 1 Tablet by mouth with dinner. 90 Tablet 1   • atorvastatin (LIPITOR) 10 MG Tab Take 1 tablet by mouth every evening. 90 tablet 3   • Potassium 99 MG Tab Take 99 mg by mouth every day.     • diphenhydrAMINE-APAP, sleep,  MG Tab Take 2 Tablets by mouth at bedtime.     • acetaminophen (TYLENOL) 500 MG Tab Take 500 mg by mouth 2 times a day.     • Melatonin 10 MG Tab Take 10 mg by mouth at bedtime.     • vitamin D (CHOLECALCIFEROL) 1000 UNIT Tab Take 1,000 Units by mouth every morning.          Current Outpatient Medications on File Prior to Visit   Medication Sig Dispense Refill   • Multiple Vitamins-Minerals (PRESERVISION AREDS 2 PO) every day.     • Calcium Carbonate (CALCIUM 600 PO) Take 300 mg by mouth.     • SODIUM FLUORIDE 5000 PLUS 1.1 % Cream BRUSH WITH PEA SIZED AMOUNT EVERY DAY PREFERABLY BEFORE BEDTIME. SPIT OUT ALL EXCESS. DO NOT RINSE     • hydroxychloroquine (PLAQUENIL) 200 MG Tab 11/2 tablet p.o. daily (Patient taking differently: Take 200 mg by mouth every day.) 135 Tablet 0   • omeprazole (PRILOSEC) 20 MG delayed-release capsule Take 1 Capsule by mouth every day. 90 Capsule 3   • lidocaine (LIDODERM) 5 % Patch APPLY ONE PATCH TO CLEAN DRY SKIN AS DIRECTED DAILY 90 Patch 1   • denosumab (PROLIA) 60 MG/ML Solution Prefilled Syringe injection Inject 60 mg under the skin every 6 months.     • Polyethylene Glycol 3350 (MIRALAX PO) Take  by mouth every day at 6 PM. 1 teaspoon daily     • Magnesium 400 MG Cap every day.     •  "B Complex Vitamins (B COMPLEX B-12 PO) Take  by mouth.     • rivaroxaban (XARELTO) 20 MG Tab tablet Take 1 Tablet by mouth with dinner. 90 Tablet 1   • atorvastatin (LIPITOR) 10 MG Tab Take 1 tablet by mouth every evening. 90 tablet 3   • Potassium 99 MG Tab Take 99 mg by mouth every day.     • diphenhydrAMINE-APAP, sleep,  MG Tab Take 2 Tablets by mouth at bedtime.     • acetaminophen (TYLENOL) 500 MG Tab Take 500 mg by mouth 2 times a day.     • Melatonin 10 MG Tab Take 10 mg by mouth at bedtime.     • vitamin D (CHOLECALCIFEROL) 1000 UNIT Tab Take 1,000 Units by mouth every morning.       No current facility-administered medications on file prior to visit.         EXAMINATION     Physical Exam:   /76 (BP Location: Left arm, Patient Position: Sitting, BP Cuff Size: Adult)   Pulse 64   Temp 36.5 °C (97.7 °F) (Temporal)   Ht 1.6 m (5' 3\")   Wt 72.4 kg (159 lb 9.8 oz)   SpO2 95%     Constitutional:   Body Habitus: Body mass index is 28.27 kg/m².  Cooperation: Fully cooperates with exam  Appearance: Well-groomed, well-nourished.    Eyes: No scleral icterus to suggest severe liver disease, no proptosis to suggest severe hyperthyroidism    ENT -no obvious auditory deficits, no noticeable facial droop     Skin -no rashes or lesions noted     Respiratory-  breathing comfortably on room air, no audible wheezing    Cardiovascular-distal extremities warm and well perfused.  No lower extremity edema is noted.     Gastrointestinal - no obvious abdominal masses, non-distended    Psychiatric- alert and oriented ×3. Normal affect.     Gait - antalgic gait favoring right leg. Uses cane for balance.     Musculoskeletal and Neuro -      Thoracic/Lumbar Spine/Sacral Spine/Hips   Inspection: No evidence of atrophy in bilateral lower extremities throughout      ROM: limited active range of motion with lumbar flexion, lateral flexion, and rotation bilaterally.   There is limited active range of motion with lumbar " extension     Facet loading maneuver positive on left, neg on right     Palpation:   Tenderness to palpation over the midline of lumbosacral spine, paraspinal muscles bilaterally and lumbar facets bilaterally. No tenderness to palpation elsewhere in the low back/hips including sacroiliac joints bilaterally, PSIS bilaterally and greater trochanters bilaterally.     Lumbar spine /hip provocative exam maneuvers  Straight leg raise positive on right, negative on left  FADIR test negative bilaterally     SI joint tests  JENNIFER test negative bilaterally     Key points for the international standards for neurological classification of spinal cord injury (ISNCSCI) to light touch.   Dermatome R L   L2 2 2   L3 2 2   L4 1 2   L5 1 2   S1 1 1   S2 2 2         Motor Exam Lower Extremities  ? Myotome R L   Hip flexion L2 5 5   Knee extension L3 5 5   Ankle dorsiflexion L4 5 5   Toe extension L5 5 5   Ankle plantarflexion S1 5 5         Previous exam  Bilateral hip abductor 4-/5    Thigh thrust test negative bilaterally  SI joint compression negative bilaterally  SI joint distraction negative bilaterally  Sacral thrust test negative bilaterally  Gaenslens maneuver negative bilaterally      Reflexes  ?   R L   Patella   2+ 2+   Achilles    2+ 2+      Clonus of the ankle negative bilaterally       MEDICAL DECISION MAKING    Medical records review: see under HPI section.     DATA    Labs: Personally reviewed at today's visit    Lab Results   Component Value Date/Time    SODIUM 140 03/22/2022 03:25 PM    POTASSIUM 4.5 03/22/2022 03:25 PM    CHLORIDE 103 03/22/2022 03:25 PM    CO2 27 03/22/2022 03:25 PM    ANION 10.0 03/22/2022 03:25 PM    GLUCOSE 98 03/22/2022 03:25 PM    BUN 24 (H) 03/22/2022 03:25 PM    CREATININE 0.62 03/22/2022 03:25 PM    CALCIUM 9.2 03/22/2022 03:25 PM    ASTSGOT 21 03/22/2022 03:25 PM    ALTSGPT 21 03/22/2022 03:25 PM    TBILIRUBIN 0.5 03/22/2022 03:25 PM    ALBUMIN 4.5 03/22/2022 03:25 PM    TOTPROTEIN 6.6  03/22/2022 03:25 PM    GLOBULIN 2.1 03/22/2022 03:25 PM    AGRATIO 2.1 03/22/2022 03:25 PM       Lab Results   Component Value Date/Time    PROTHROMBTM 15.2 (H) 03/22/2022 03:25 PM    INR 1.23 (H) 03/22/2022 03:25 PM        Lab Results   Component Value Date/Time    WBC 6.4 03/22/2022 03:25 PM    RBC 4.73 03/22/2022 03:25 PM    HEMOGLOBIN 14.9 03/22/2022 03:25 PM    HEMATOCRIT 45.4 03/22/2022 03:25 PM    MCV 96.0 03/22/2022 03:25 PM    MCH 31.5 03/22/2022 03:25 PM    MCHC 32.8 (L) 03/22/2022 03:25 PM    MPV 11.2 03/22/2022 03:25 PM    NEUTSPOLYS 69.00 03/22/2022 03:25 PM    LYMPHOCYTES 19.20 (L) 03/22/2022 03:25 PM    MONOCYTES 6.90 03/22/2022 03:25 PM    EOSINOPHILS 3.80 03/22/2022 03:25 PM    BASOPHILS 0.80 03/22/2022 03:25 PM        Lab Results   Component Value Date/Time    HBA1C 5.5 05/16/2016 07:50 AM      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 Mild bilatearl neuroforaminal stenosis at L4-L5 and L5-S1. Facet arthropathy worst at bilateral L3-L4, L4-L5, and L5-S1. Grade 1 anterolisthesis of L4 on L5.     MRI lumbar spine 5/26/20  Severe bilateral facet arthropathy at L4-L5. Moderate-severe facet arthropathy at bilateral L3-L4. Grade 1 anterolisthesis of L4 on L5. Mod-severe right neuroforaminal stenosis at L3-L4, mild left neuroforaminal stenosis at L3-L4. Moderate central stenosis at L3-4.               IMAGING radiology reads. I reviewed the following radiology reads                      Results for orders placed during the hospital encounter of 03/31/22    MR-LUMBAR SPINE-W/O    Impression  Interval worsening L3/4 central protrusion results in worsening moderate to severe central canal and moderate left foraminal stenoses    Mild worsening T12/L1 degenerative disc disease resulting in new mild central  stenosis    Otherwise stable multilevel degenerative change resulting in foraminal predominate stenoses as detailed above    Stable L4/5 grade 1 anterolisthesis of secondary to severe facet arthropathy. Stable minimal degenerative retrolisthesis of the upper lumbar levels    Mature L5/S1 interbody fusion                 Results for orders placed during the hospital encounter of 05/26/20     MR-LUMBAR SPINE-W/O     Impression  Interval worsening L3/4 central protrusion results in worsening moderate central stenosis     Otherwise stable multilevel degenerative change resulting in foraminal predominate stenoses, greatest is moderate to severe on the right at L3/4.     Lesser stenoses at other levels as detailed above     Stable L4/5 grade 1 anterolisthesis of secondary to severe facet arthropathy. Stable minimal degenerative retrolisthesis of the upper lumbar levels     Mature L5/S1 interbody fusion      Results for orders placed during the hospital encounter of 11/01/16     DX-LUMBAR SPINE-2 OR 3 VIEWS     Impression  1.  There has been progression of mild diffuse degenerative disc disease and facet disease throughout the entire lumbar spine.  2.  There are degenerative areas of anterolisthesis and retrolisthesis as noted above.      Results for orders placed during the hospital encounter of 11/17/20     DX-LUMBAR SPINE-4+ VIEWS     Impression  1.  No compression deformity or acute fracture is identified.     2.  There is grade 2 anterolisthesis now identified at L4-L5 which is significantly increased compared to the prior exam. There appears to be L4 spondylolysis.     3.  Some interval increase in degenerative disc disease and facet arthropathy.     4.  No focal instability noted on flexion extension views.         Diagnosis  Visit Diagnoses     ICD-10-CM   1. Lumbar spondylosis  M47.816   2. Chronic bilateral low back pain with right-sided sciatica  M54.41    G89.29   3. Spinal stenosis of lumbar region,  unspecified whether neurogenic claudication present  M48.061   4. Rheumatoid arthritis involving multiple sites with positive rheumatoid factor (HCC)  M05.79   5. Neuropathy  G62.9   6. Numbness and tingling of right leg  R20.0    R20.2   7. Lumbar radiculopathy  M54.16         ASSESSMENT AND PLAN:  Marry Mathur (: 1935) is a female with history of osteoporosis, protein S deficiency, left TKA, left CARLYLE, RA, HLD, HTN, GERD, DVT on chronic Xarelto, and neuropathy in bilateral feet who presents with progressively ongoing severe low back pain radiating down her right leg and pain-limited right leg weakness consistent with lumbar radiculopathy likely 2/2 neuroforaminal stenosis at right L3-L4 and right L4-L5 on MRI 3/31/22. Her worst pain is in her right low back radiating down her right leg in the right L4 and L5 distribution.  Only partial slight relief with ILESI at right L4-L5.     Also has positive right-sided lumbar facet loading maneuver suggestive of lumbar facetogenic pain    She endorses worsening pain with lumbar extension improved with lumbar flexion. Denies left leg symptoms. Lumbar spinal stenosis with neurogenic claudication is in the differential although I feel it is less likely to be the main etiology of her symptoms at this time.     Marry was seen today for follow-up.    Diagnoses and all orders for this visit:    Lumbar spondylosis    Chronic bilateral low back pain with right-sided sciatica    Spinal stenosis of lumbar region, unspecified whether neurogenic claudication present    Rheumatoid arthritis involving multiple sites with positive rheumatoid factor (HCC)    Neuropathy    Numbness and tingling of right leg    Lumbar radiculopathy  -     Referral to Pain Clinic    Other orders  -     gabapentin (NEURONTIN) 100 MG Cap; Take 4 Capsules by mouth at bedtime.          PLAN  Physical Therapy: continue PT.      Diagnostic workup: no new imaging needed at this time. Again reviewed  at today's visit and discussed with patient: MRI lumbar spine 3/31/22     Medications:  - increase Gabapentin to 400mg QHS.  Counseled that she may experience side effect of drowsiness while your body is adjusting to the medicine and that if this interferes with her quality of life or ability to form ADLs, she should decrease it back to 300 mg nightly  - NSAIDs contraindicated due to being on chronic anticoagulation for protein S deficiency and hx of DVT     Interventions:  - right L4-L5 and L5-S1 transforaminal epidural steroid injection. The risks, benefits, and alternatives to this procedure were discussed and the patient wishes to proceed with the procedure. Risks include but are not limited to damage to surrounding structures, infection, bleeding, worsening of pain which can be permanent, and weakness which can be permanent. Benefits include pain relief and improved function. Alternatives include not doing the procedure.    - s/p right Lumbar L4-5 interlaminar epidural steroid injection under fluoroscopic guidance.   - requesting pt hold Xarelto for 3 days prior and restart 24 hours after - previously received clearance     Follow-up: 4 weeks after procedure    Indira Lee MD  Interventional Pain and Spine  Physical Medicine and Rehabilitation  [facility]    The above note documents my personal evaluation of this patient. In addition, I have reviewed and confirmed with the patient and MA the supportive information documented in today's Patient Health Questionnaire and Office Note.     Please note that this dictation was created using voice recognition software. I have made every reasonable attempt to correct obvious errors, but I expect that there are errors of grammar and possibly content that I did not discover before finalizing the note.

## 2022-05-23 ENCOUNTER — HOSPITAL ENCOUNTER (OUTPATIENT)
Dept: CARDIOLOGY | Facility: MEDICAL CENTER | Age: 87
End: 2022-05-23
Attending: INTERNAL MEDICINE
Payer: MEDICARE

## 2022-05-23 DIAGNOSIS — Z01.810 PRE-OPERATIVE CARDIOVASCULAR EXAMINATION: ICD-10-CM

## 2022-05-23 PROCEDURE — 93306 TTE W/DOPPLER COMPLETE: CPT

## 2022-05-25 LAB
LV EJECT FRACT  99904: 65
LV EJECT FRACT MOD 2C 99903: 59.53
LV EJECT FRACT MOD 4C 99902: 75.37
LV EJECT FRACT MOD BP 99901: 66.74

## 2022-05-25 PROCEDURE — 93306 TTE W/DOPPLER COMPLETE: CPT | Mod: 26 | Performed by: INTERNAL MEDICINE

## 2022-05-26 ENCOUNTER — OFFICE VISIT (OUTPATIENT)
Dept: PHYSICAL MEDICINE AND REHAB | Facility: MEDICAL CENTER | Age: 87
End: 2022-05-26
Payer: MEDICARE

## 2022-05-26 ENCOUNTER — TELEPHONE (OUTPATIENT)
Dept: CARDIOLOGY | Facility: MEDICAL CENTER | Age: 87
End: 2022-05-26

## 2022-05-26 VITALS
DIASTOLIC BLOOD PRESSURE: 76 MMHG | HEART RATE: 64 BPM | SYSTOLIC BLOOD PRESSURE: 118 MMHG | TEMPERATURE: 97.7 F | OXYGEN SATURATION: 95 % | BODY MASS INDEX: 28.28 KG/M2 | WEIGHT: 159.61 LBS | HEIGHT: 63 IN

## 2022-05-26 DIAGNOSIS — R20.0 NUMBNESS AND TINGLING OF RIGHT LEG: ICD-10-CM

## 2022-05-26 DIAGNOSIS — R20.2 NUMBNESS AND TINGLING OF RIGHT LEG: ICD-10-CM

## 2022-05-26 DIAGNOSIS — G62.9 NEUROPATHY: ICD-10-CM

## 2022-05-26 DIAGNOSIS — M54.16 LUMBAR RADICULOPATHY: ICD-10-CM

## 2022-05-26 DIAGNOSIS — M54.41 CHRONIC BILATERAL LOW BACK PAIN WITH RIGHT-SIDED SCIATICA: ICD-10-CM

## 2022-05-26 DIAGNOSIS — G89.29 CHRONIC BILATERAL LOW BACK PAIN WITH RIGHT-SIDED SCIATICA: ICD-10-CM

## 2022-05-26 DIAGNOSIS — M05.79 RHEUMATOID ARTHRITIS INVOLVING MULTIPLE SITES WITH POSITIVE RHEUMATOID FACTOR (HCC): ICD-10-CM

## 2022-05-26 DIAGNOSIS — M47.816 LUMBAR SPONDYLOSIS: ICD-10-CM

## 2022-05-26 DIAGNOSIS — M48.061 SPINAL STENOSIS OF LUMBAR REGION, UNSPECIFIED WHETHER NEUROGENIC CLAUDICATION PRESENT: ICD-10-CM

## 2022-05-26 PROCEDURE — 99214 OFFICE O/P EST MOD 30 MIN: CPT | Performed by: STUDENT IN AN ORGANIZED HEALTH CARE EDUCATION/TRAINING PROGRAM

## 2022-05-26 RX ORDER — GABAPENTIN 100 MG/1
400 CAPSULE ORAL
Qty: 120 CAPSULE | Refills: 2 | Status: SHIPPED | OUTPATIENT
Start: 2022-05-26 | End: 2022-05-27 | Stop reason: SDUPTHER

## 2022-05-26 ASSESSMENT — PAIN SCALES - GENERAL: PAINLEVEL: 5=MODERATE PAIN

## 2022-05-26 ASSESSMENT — FIBROSIS 4 INDEX: FIB4 SCORE: 2.39

## 2022-05-26 ASSESSMENT — PATIENT HEALTH QUESTIONNAIRE - PHQ9: CLINICAL INTERPRETATION OF PHQ2 SCORE: 0

## 2022-05-26 NOTE — TELEPHONE ENCOUNTER
Referral from: Dr. Rodney for mod-sev AS    Patient called on 5/26/2022. Scheduled surveillance appt with Dr. Serrano.

## 2022-05-26 NOTE — NON-PROVIDER
Name verified. Consent for procedure and site confirmed and signed.H&P updated.Current medications , allergies reviewed . Printed home care discharged  verbal instructions given and  she  verbalized understanding. Pertinent medical health information  reviewed  (HTN, DVT, rheumatoid arthritis ) . Confirmed  waiting.  Xarelto off  4 days.  Communicated to Dr. Cooper and [facility] LARA.   home

## 2022-05-27 RX ORDER — GABAPENTIN 100 MG/1
400 CAPSULE ORAL
Qty: 120 CAPSULE | Refills: 2 | Status: SHIPPED | OUTPATIENT
Start: 2022-05-27 | End: 2022-06-30

## 2022-05-31 ENCOUNTER — HOSPITAL ENCOUNTER (OUTPATIENT)
Facility: REHABILITATION | Age: 87
End: 2022-05-31
Attending: STUDENT IN AN ORGANIZED HEALTH CARE EDUCATION/TRAINING PROGRAM | Admitting: STUDENT IN AN ORGANIZED HEALTH CARE EDUCATION/TRAINING PROGRAM
Payer: MEDICARE

## 2022-05-31 ENCOUNTER — TELEPHONE (OUTPATIENT)
Dept: PHYSICAL MEDICINE AND REHAB | Facility: MEDICAL CENTER | Age: 87
End: 2022-05-31
Payer: MEDICARE

## 2022-05-31 ENCOUNTER — APPOINTMENT (OUTPATIENT)
Dept: RADIOLOGY | Facility: REHABILITATION | Age: 87
End: 2022-05-31
Attending: STUDENT IN AN ORGANIZED HEALTH CARE EDUCATION/TRAINING PROGRAM
Payer: MEDICARE

## 2022-05-31 VITALS
DIASTOLIC BLOOD PRESSURE: 82 MMHG | TEMPERATURE: 97.5 F | HEART RATE: 64 BPM | SYSTOLIC BLOOD PRESSURE: 152 MMHG | OXYGEN SATURATION: 95 % | RESPIRATION RATE: 16 BRPM | HEIGHT: 63 IN | BODY MASS INDEX: 27.34 KG/M2 | WEIGHT: 154.32 LBS

## 2022-05-31 PROCEDURE — 64484 NJX AA&/STRD TFRM EPI L/S EA: CPT

## 2022-05-31 PROCEDURE — 700111 HCHG RX REV CODE 636 W/ 250 OVERRIDE (IP)

## 2022-05-31 PROCEDURE — 700117 HCHG RX CONTRAST REV CODE 255

## 2022-05-31 PROCEDURE — 64483 NJX AA&/STRD TFRM EPI L/S 1: CPT

## 2022-05-31 RX ORDER — LIDOCAINE HYDROCHLORIDE 10 MG/ML
INJECTION, SOLUTION EPIDURAL; INFILTRATION; INTRACAUDAL; PERINEURAL
Status: COMPLETED
Start: 2022-05-31 | End: 2022-05-31

## 2022-05-31 RX ORDER — DEXAMETHASONE SODIUM PHOSPHATE 10 MG/ML
INJECTION, SOLUTION INTRAMUSCULAR; INTRAVENOUS
Status: COMPLETED
Start: 2022-05-31 | End: 2022-05-31

## 2022-05-31 RX ADMIN — DEXAMETHASONE SODIUM PHOSPHATE 10 MG: 10 INJECTION, SOLUTION INTRAMUSCULAR; INTRAVENOUS at 15:01

## 2022-05-31 RX ADMIN — IOHEXOL 5 ML: 240 INJECTION, SOLUTION INTRATHECAL; INTRAVASCULAR; INTRAVENOUS; ORAL at 15:01

## 2022-05-31 RX ADMIN — LIDOCAINE HYDROCHLORIDE 10 ML: 10 INJECTION, SOLUTION EPIDURAL; INFILTRATION; INTRACAUDAL; PERINEURAL at 15:00

## 2022-05-31 ASSESSMENT — FIBROSIS 4 INDEX: FIB4 SCORE: 2.39

## 2022-05-31 NOTE — TELEPHONE ENCOUNTER
Called pt to review pre-SP guidelines for upcoming SP with Dr. Lee on 5/31. Reviewed medication and dietary restrictions, need for  and early arrival. Pt had no unanswered questions at this time.    Pt says she has been holding xarelto as advised.

## 2022-05-31 NOTE — INTERVAL H&P NOTE
H&P reviewed. The patient was examined and there are no changes to the H&P    Indira Lee MD  Interventional Pain and Spine  Physical Medicine and Rehabilitation  [facility]

## 2022-05-31 NOTE — OP REPORT
Date of Service: 5/31/2022     Patient: Marry Mathur 86 y.o. female     MRN: [id number]     Physician/s: Indira Lee MD    Pre-operative Diagnosis: Lumbar radiculopathy    Post-operative Diagnosis: Lumbar radiculopathy    Procedure: right Lumbar Transforaminal Epidural Steroid  at the L4-5 and L5-S1 levels.     Description of procedure:    The risks, benefits, and alternatives of the procedure were reviewed and discussed with the patient.  Written informed consent was freely obtained. A pre-procedural time-out was conducted by the physician verifying patient’s identity, procedure to be performed, procedure site and side, and allergy verification. Appropriate equipment was determined to be in place for the procedure.     The patient's vital signs were carefully monitored before, throughout, and after the procedure.     In the fluoroscopy suite the patient was placed in a prone position, a pillow placed underneath their umbilicus. The skin was prepped and draped in the usual sterile fashion.     The fluoroscope was placed over the lumbar spine and adjusted into the proper AP/Oblique view to enter the transforaminal space just adjacent to the pedicle at the levels below. The targets for injection were then marked at the right L4-5. A 27g 1.5 inch needle was placed into the marked site, and approximately 1mL of 1% Lidocaine was injected subcutaneously into the epidermal and dermal layers. The needle was removed intact.  A 22g 3.5 inch spinal needle was then placed and advanced under fluoroscopic guidance in an oblique view towards the epidural space of the levels noted above. The needle position was confirmed to not be past the 6 o'clock position in the AP view and it was in the neuroforamen in the lateral view.     The fluoroscope was was then adjusted over the lumbar spine and adjusted into the proper AP/Oblique view to enter the transforaminal space adjacent to the pedicle at the RIGHT  L5-S1. A 27g 1.5 inch  needle was placed into the marked site, and 1mL of 1% Lidocaine was injected subcutaneously into the epidermal and dermal layers. The needle was removed intact.  A 22g 3.5 inch spinal needle was then placed and advanced under fluoroscopic guidance in an oblique view towards the epidural space of the levels noted above. The needle position was confirmed to not be past the 6 o'clock position in the AP view and it was in the neuroforamen in the lateral view.     Under live fluoroscopic guidance in the AP view, contrast dye was used to highlight the epidural space spread of each level above. Final fluoroscopic images were saved.  Following negative aspiration, 1.5mL of 1% lidocaine preservative free with 0.5mL of 10mg/mL of dexamethasone was then injected at each level above, and the needles were removed intact after restyleted. The patient's back was covered with a 4x4 gauze, the area was cleansed with sterile normal saline, and a dressing was applied. There were no complications noted.     The patient was then evaluated post-procedure, and was hemodynamically stable prior to leaving the post-operative care unit.     Follow-up as scheduled    Indira Lee MD  Interventional Pain and Spine  Physical Medicine and Rehabilitation  [facility]      CPT codes  Transforaminal epidural injection- lumbar or sacral (first level):  62819  Transforaminal epidural injection- lumbar or sacral (each additional level):  55237

## 2022-05-31 NOTE — TELEPHONE ENCOUNTER
Patient called and is concerned that she has not been contacted to pre screen for Special Procedure.,      She would like a call back from Dr. Rosa mendenhall MA       Thank you ,       Batsheva

## 2022-05-31 NOTE — PROGRESS NOTES
1415:  Patient admitted to pre-op, health assessment complete, HX reviewed (HTN, Blood clots, aortic stenosis, colon CA, RA, hip and knee replacements, GERD), current medications reviewed with patient, see MAR, pt denies taking blood thinners (Xarelto) for 3 days.  Patient has a ride post procedure.  Printed and verbal discharge instructions given with patient understanding,   1452: hand-off given to Shayy Lopez RN.

## 2022-05-31 NOTE — PROGRESS NOTES
1452 Received report from pre-procedure RN..  Pre-assessment completed, pt positioned on procedure table prone and hands resting on stool.  Blood pressure and pulse ox connected to pt by surg tech and RN.  Pt hx reviewed (htn, GERD), allergies reviewed (Wound dressing adhesive), No sedation given.  Pt identified and time out performed with team agreeable.

## 2022-05-31 NOTE — PROGRESS NOTES
1510: Rec'd pt from OR, report rec'd from Jessica, procedure RN, pt ambulatory to chair, VSS, tolerating liquids with no nausea.  Pain assessed.  Dressing CDI.  Ice pack placed to incision site.    Dr. Lee in to see patient.    1521: Patient ambulatory and meets D/C criteria.      Patient d/c'd to designated .

## 2022-06-01 ENCOUNTER — PRE-ADMISSION TESTING (OUTPATIENT)
Dept: ADMISSIONS | Facility: MEDICAL CENTER | Age: 87
End: 2022-06-01
Attending: SURGERY
Payer: MEDICARE

## 2022-06-01 DIAGNOSIS — Z01.812 PRE-OPERATIVE LABORATORY EXAMINATION: ICD-10-CM

## 2022-06-01 LAB
ALBUMIN SERPL BCP-MCNC: 4.4 G/DL (ref 3.2–4.9)
ALBUMIN/GLOB SERPL: 1.8 G/DL
ALP SERPL-CCNC: 47 U/L (ref 30–99)
ALT SERPL-CCNC: 21 U/L (ref 2–50)
ANION GAP SERPL CALC-SCNC: 12 MMOL/L (ref 7–16)
AST SERPL-CCNC: 17 U/L (ref 12–45)
BASOPHILS # BLD AUTO: 0.3 % (ref 0–1.8)
BASOPHILS # BLD: 0.03 K/UL (ref 0–0.12)
BILIRUB SERPL-MCNC: 0.6 MG/DL (ref 0.1–1.5)
BUN SERPL-MCNC: 26 MG/DL (ref 8–22)
CALCIUM SERPL-MCNC: 8.9 MG/DL (ref 8.5–10.5)
CHLORIDE SERPL-SCNC: 97 MMOL/L (ref 96–112)
CO2 SERPL-SCNC: 24 MMOL/L (ref 20–33)
CREAT SERPL-MCNC: 0.77 MG/DL (ref 0.5–1.4)
EOSINOPHIL # BLD AUTO: 0.11 K/UL (ref 0–0.51)
EOSINOPHIL NFR BLD: 1.2 % (ref 0–6.9)
ERYTHROCYTE [DISTWIDTH] IN BLOOD BY AUTOMATED COUNT: 46.5 FL (ref 35.9–50)
GFR SERPLBLD CREATININE-BSD FMLA CKD-EPI: 75 ML/MIN/1.73 M 2
GLOBULIN SER CALC-MCNC: 2.5 G/DL (ref 1.9–3.5)
GLUCOSE SERPL-MCNC: 98 MG/DL (ref 65–99)
HCT VFR BLD AUTO: 40.8 % (ref 37–47)
HGB BLD-MCNC: 13.6 G/DL (ref 12–16)
IMM GRANULOCYTES # BLD AUTO: 0.04 K/UL (ref 0–0.11)
IMM GRANULOCYTES NFR BLD AUTO: 0.4 % (ref 0–0.9)
LYMPHOCYTES # BLD AUTO: 1.03 K/UL (ref 1–4.8)
LYMPHOCYTES NFR BLD: 11.3 % (ref 22–41)
MCH RBC QN AUTO: 31.5 PG (ref 27–33)
MCHC RBC AUTO-ENTMCNC: 33.3 G/DL (ref 33.6–35)
MCV RBC AUTO: 94.4 FL (ref 81.4–97.8)
MONOCYTES # BLD AUTO: 0.79 K/UL (ref 0–0.85)
MONOCYTES NFR BLD AUTO: 8.7 % (ref 0–13.4)
NEUTROPHILS # BLD AUTO: 7.13 K/UL (ref 2–7.15)
NEUTROPHILS NFR BLD: 78.1 % (ref 44–72)
NRBC # BLD AUTO: 0 K/UL
NRBC BLD-RTO: 0 /100 WBC
PLATELET # BLD AUTO: 205 K/UL (ref 164–446)
PMV BLD AUTO: 11 FL (ref 9–12.9)
POTASSIUM SERPL-SCNC: 4.2 MMOL/L (ref 3.6–5.5)
PROT SERPL-MCNC: 6.9 G/DL (ref 6–8.2)
RBC # BLD AUTO: 4.32 M/UL (ref 4.2–5.4)
SODIUM SERPL-SCNC: 133 MMOL/L (ref 135–145)
WBC # BLD AUTO: 9.1 K/UL (ref 4.8–10.8)

## 2022-06-01 PROCEDURE — 85025 COMPLETE CBC W/AUTO DIFF WBC: CPT

## 2022-06-01 PROCEDURE — 36415 COLL VENOUS BLD VENIPUNCTURE: CPT

## 2022-06-01 PROCEDURE — 80053 COMPREHEN METABOLIC PANEL: CPT

## 2022-06-01 ASSESSMENT — FIBROSIS 4 INDEX: FIB4 SCORE: 2.39

## 2022-06-01 NOTE — PREPROCEDURE INSTRUCTIONS
Pre admit appointment complete. Instructed patient to get Xarelto instructions from MD. Verbalized understanding and stated she will contact Pharmacist at [facility] clinic.

## 2022-06-06 DIAGNOSIS — E78.5 HYPERLIPIDEMIA, UNSPECIFIED HYPERLIPIDEMIA TYPE: ICD-10-CM

## 2022-06-06 RX ORDER — ATORVASTATIN CALCIUM 10 MG/1
10 TABLET, FILM COATED ORAL EVERY EVENING
Qty: 90 TABLET | Refills: 1 | Status: SHIPPED | OUTPATIENT
Start: 2022-06-06 | End: 2023-01-06 | Stop reason: SDUPTHER

## 2022-06-06 NOTE — TELEPHONE ENCOUNTER
Requested Prescriptions     Signed Prescriptions Disp Refills   • atorvastatin (LIPITOR) 10 MG Tab 90 Tablet 1     Sig: Take 1 Tablet by mouth every evening.     Authorizing Provider: ORALIA MONTANO A.P.R.N.

## 2022-06-07 ENCOUNTER — ANESTHESIA EVENT (OUTPATIENT)
Dept: SURGERY | Facility: MEDICAL CENTER | Age: 87
End: 2022-06-07
Payer: MEDICARE

## 2022-06-07 ENCOUNTER — ANESTHESIA (OUTPATIENT)
Dept: SURGERY | Facility: MEDICAL CENTER | Age: 87
End: 2022-06-07
Payer: MEDICARE

## 2022-06-07 ENCOUNTER — HOSPITAL ENCOUNTER (OUTPATIENT)
Facility: MEDICAL CENTER | Age: 87
End: 2022-06-07
Attending: SURGERY | Admitting: SURGERY
Payer: MEDICARE

## 2022-06-07 VITALS
DIASTOLIC BLOOD PRESSURE: 69 MMHG | HEART RATE: 85 BPM | WEIGHT: 154.32 LBS | BODY MASS INDEX: 27.34 KG/M2 | SYSTOLIC BLOOD PRESSURE: 175 MMHG | TEMPERATURE: 97.1 F | HEIGHT: 63 IN | RESPIRATION RATE: 15 BRPM | OXYGEN SATURATION: 92 %

## 2022-06-07 PROBLEM — C77.3 SECONDARY AND UNSPECIFIED MALIGNANT NEOPLASM OF AXILLA AND UPPER LIMB LYMPH NODES (HCC): Status: ACTIVE | Noted: 2022-06-07

## 2022-06-07 LAB — PATHOLOGY CONSULT NOTE: NORMAL

## 2022-06-07 PROCEDURE — 160025 RECOVERY II MINUTES (STATS): Performed by: SURGERY

## 2022-06-07 PROCEDURE — 88342 IMHCHEM/IMCYTCHM 1ST ANTB: CPT | Mod: XU

## 2022-06-07 PROCEDURE — 160028 HCHG SURGERY MINUTES - 1ST 30 MINS LEVEL 3: Performed by: SURGERY

## 2022-06-07 PROCEDURE — 88360 TUMOR IMMUNOHISTOCHEM/MANUAL: CPT

## 2022-06-07 PROCEDURE — 160035 HCHG PACU - 1ST 60 MINS PHASE I: Performed by: SURGERY

## 2022-06-07 PROCEDURE — 700101 HCHG RX REV CODE 250: Performed by: SURGERY

## 2022-06-07 PROCEDURE — 700111 HCHG RX REV CODE 636 W/ 250 OVERRIDE (IP): Performed by: ANESTHESIOLOGY

## 2022-06-07 PROCEDURE — 88307 TISSUE EXAM BY PATHOLOGIST: CPT

## 2022-06-07 PROCEDURE — 160009 HCHG ANES TIME/MIN: Performed by: SURGERY

## 2022-06-07 PROCEDURE — 99100 ANES PT EXTEME AGE<1 YR&>70: CPT | Performed by: ANESTHESIOLOGY

## 2022-06-07 PROCEDURE — 160002 HCHG RECOVERY MINUTES (STAT): Performed by: SURGERY

## 2022-06-07 PROCEDURE — 160046 HCHG PACU - 1ST 60 MINS PHASE II: Performed by: SURGERY

## 2022-06-07 PROCEDURE — 160047 HCHG PACU  - EA ADDL 30 MINS PHASE II: Performed by: SURGERY

## 2022-06-07 PROCEDURE — 160039 HCHG SURGERY MINUTES - EA ADDL 1 MIN LEVEL 3: Performed by: SURGERY

## 2022-06-07 PROCEDURE — 88341 IMHCHEM/IMCYTCHM EA ADD ANTB: CPT | Mod: XU

## 2022-06-07 PROCEDURE — 160048 HCHG OR STATISTICAL LEVEL 1-5: Performed by: SURGERY

## 2022-06-07 PROCEDURE — 01610 ANES ALL PX NRV MUSC SHO&AX: CPT | Performed by: ANESTHESIOLOGY

## 2022-06-07 RX ORDER — ISOSULFAN BLUE 50 MG/5ML
INJECTION, SOLUTION SUBCUTANEOUS
Status: DISCONTINUED
Start: 2022-06-07 | End: 2022-06-07 | Stop reason: HOSPADM

## 2022-06-07 RX ORDER — BUPIVACAINE HYDROCHLORIDE AND EPINEPHRINE 5; 5 MG/ML; UG/ML
INJECTION, SOLUTION EPIDURAL; INTRACAUDAL; PERINEURAL
Status: DISCONTINUED | OUTPATIENT
Start: 2022-06-07 | End: 2022-06-07 | Stop reason: HOSPADM

## 2022-06-07 RX ORDER — SODIUM CHLORIDE, SODIUM LACTATE, POTASSIUM CHLORIDE, CALCIUM CHLORIDE 600; 310; 30; 20 MG/100ML; MG/100ML; MG/100ML; MG/100ML
INJECTION, SOLUTION INTRAVENOUS CONTINUOUS
Status: DISCONTINUED | OUTPATIENT
Start: 2022-06-07 | End: 2022-06-07 | Stop reason: HOSPADM

## 2022-06-07 RX ORDER — ONDANSETRON 2 MG/ML
4 INJECTION INTRAMUSCULAR; INTRAVENOUS
Status: DISCONTINUED | OUTPATIENT
Start: 2022-06-07 | End: 2022-06-07 | Stop reason: HOSPADM

## 2022-06-07 RX ORDER — CEFAZOLIN SODIUM 1 G/3ML
INJECTION, POWDER, FOR SOLUTION INTRAMUSCULAR; INTRAVENOUS PRN
Status: DISCONTINUED | OUTPATIENT
Start: 2022-06-07 | End: 2022-06-07 | Stop reason: SURG

## 2022-06-07 RX ORDER — BUPIVACAINE HYDROCHLORIDE 5 MG/ML
INJECTION, SOLUTION EPIDURAL; INTRACAUDAL
Status: DISCONTINUED
Start: 2022-06-07 | End: 2022-06-07 | Stop reason: HOSPADM

## 2022-06-07 RX ORDER — HALOPERIDOL 5 MG/ML
1 INJECTION INTRAMUSCULAR
Status: DISCONTINUED | OUTPATIENT
Start: 2022-06-07 | End: 2022-06-07 | Stop reason: HOSPADM

## 2022-06-07 RX ORDER — DIPHENHYDRAMINE HYDROCHLORIDE 50 MG/ML
12.5 INJECTION INTRAMUSCULAR; INTRAVENOUS
Status: DISCONTINUED | OUTPATIENT
Start: 2022-06-07 | End: 2022-06-07 | Stop reason: HOSPADM

## 2022-06-07 RX ORDER — OXYCODONE HCL 5 MG/5 ML
5 SOLUTION, ORAL ORAL
Status: DISCONTINUED | OUTPATIENT
Start: 2022-06-07 | End: 2022-06-07 | Stop reason: HOSPADM

## 2022-06-07 RX ORDER — OXYCODONE HCL 5 MG/5 ML
10 SOLUTION, ORAL ORAL
Status: DISCONTINUED | OUTPATIENT
Start: 2022-06-07 | End: 2022-06-07 | Stop reason: HOSPADM

## 2022-06-07 RX ORDER — PHENYLEPHRINE HYDROCHLORIDE 10 MG/ML
INJECTION, SOLUTION INTRAMUSCULAR; INTRAVENOUS; SUBCUTANEOUS PRN
Status: DISCONTINUED | OUTPATIENT
Start: 2022-06-07 | End: 2022-06-07 | Stop reason: SURG

## 2022-06-07 RX ADMIN — CEFAZOLIN 1 G: 330 INJECTION, POWDER, FOR SOLUTION INTRAMUSCULAR; INTRAVENOUS at 10:40

## 2022-06-07 RX ADMIN — PROPOFOL 150 MG: 10 INJECTION, EMULSION INTRAVENOUS at 10:40

## 2022-06-07 RX ADMIN — PHENYLEPHRINE HYDROCHLORIDE 100 MCG: 10 INJECTION INTRAVENOUS at 10:40

## 2022-06-07 RX ADMIN — FENTANYL CITRATE 100 MCG: 50 INJECTION, SOLUTION INTRAMUSCULAR; INTRAVENOUS at 10:40

## 2022-06-07 ASSESSMENT — PAIN DESCRIPTION - PAIN TYPE
TYPE: SURGICAL PAIN

## 2022-06-07 ASSESSMENT — FIBROSIS 4 INDEX: FIB4 SCORE: 1.54

## 2022-06-07 NOTE — ANESTHESIA PREPROCEDURE EVALUATION
Case: [id number] Date/Time: 06/07/22 1245    Procedure: LYMPHADENECTOMY, AXILLARY (Left Axilla)    Anesthesia type: General    Pre-op diagnosis: SECONDARY MALIGNANT NEOPLASM OF AXILLARY LYMPH NODES    Location: [facility] ROOM 27 / SURGERY SAME DAY [facility]    Surgeons: Bernardino Torres M.D.          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    Pertinent diagnostic labs and testing reviewed    Informed Consent:    Anesthetic plan and risks discussed with patient.    Use of blood products discussed with: patient whom consented to blood products.

## 2022-06-07 NOTE — ANESTHESIA TIME REPORT
Anesthesia Start and Stop Event Times     Date Time Event    6/7/2022 1030 Ready for Procedure     1031 Anesthesia Start     1216 Anesthesia Stop        Responsible Staff  06/07/22    Name Role Begin End    Chris Downs M.D. Anesth 1031 1216        Overtime Reason:  overtime    Comments:

## 2022-06-07 NOTE — OR NURSING
1158 patient arrived from OR, report received, attached to monitoring. VSS, patient oxygenating well on 6 L oxymask, oral airway in place, dressing to left axillary area: steri-strips, gauze, tegaderm, drain in place- clean/dry/intact     1211 oral airway d/c     1215 telephone updates to patient's jayneon Chadwick     1222 patient tolerating PO    1256 Dr. Downs notified of patient's -170s, per MD [place] for patient to go home with this BP, no meds given    1330 - Discharge orders received. Meets discharge criteria at this time. No pain. No nausea. Tolerating PO. On RA. All lines and monitors discontinued. Reviewed discharge paperwork with patient and stepson, also gave demo how to empty and chart drain output, both verbalize understanding. Discussed diet, activity, medications, follow up care and worsening symptoms. No questions at this time. Pt taken out to car in wheelchair with RN escort, has all belongings with her.  Sent home with gauze, tape, specimen cup also.

## 2022-06-07 NOTE — ANESTHESIA POSTPROCEDURE EVALUATION
Patient: Marry Mathur    Procedure Summary     Date: 06/07/22 Room / Location: [facility] ROOM 27 / SURGERY SAME DAY [facility]    Anesthesia Start: 1031 Anesthesia Stop: 1213    Procedure: LYMPHADENECTOMY, AXILLARY (Left Axilla) Diagnosis: (SECONDARY MALIGNANT NEOPLASM OF AXILLARY LYMPH NODES)    Surgeons: Bernardino Torres M.D. Responsible Provider: Chris Downs M.D.    Anesthesia Type: general ASA Status: 3          Final Anesthesia Type: general  Last vitals  BP   Blood Pressure : 122/53    Temp   36.2 °C (97.1 °F)    Pulse   74   Resp   (!) 22    SpO2   98 %      Anesthesia Post Evaluation    Patient location during evaluation: PACU  Patient participation: complete - patient participated  Level of consciousness: awake and alert    Airway patency: patent  Anesthetic complications: no  Cardiovascular status: hemodynamically stable  Respiratory status: acceptable  Hydration status: euvolemic    PONV: none          There were no known complications for this encounter.     Nurse Pain Score: 3 (NPRS)

## 2022-06-07 NOTE — DISCHARGE INSTRUCTIONS
ACTIVITY: Rest and take it easy for the first 24 hours.  A responsible adult is recommended to remain with you during that time.  It is normal to feel sleepy.  We encourage you to not do anything that requires balance, judgment or coordination.    MILD FLU-LIKE SYMPTOMS ARE NORMAL. YOU MAY EXPERIENCE GENERALIZED MUSCLE ACHES, THROAT IRRITATION, HEADACHE AND/OR SOME NAUSEA.    FOR 24 HOURS DO NOT:  Drive, operate machinery or run household appliances.  Drink beer or alcoholic beverages.   Make important decisions or sign legal documents.    SPECIAL INSTRUCTIONS:   Remove ACE wrap in 24 hours   Remove plastic and gauze on Monday   Leave underlying steristips on until they fall off   Patient may shower on Thursday   Teach patient how to empty and record GIL drainage and reapply suction   Send home with 2  boxes of 4x4's and tape to change dressing over the drain   She may restart the Xeralto on Thursday    DIET: To avoid nausea, slowly advance diet as tolerated, avoiding spicy or greasy foods for the first day.  Add more substantial food to your diet according to your physician's instructions. INCREASE FLUIDS AND FIBER TO AVOID CONSTIPATION.        FOLLOW-UP APPOINTMENT:  A follow-up appointment should be arranged with your doctor; call to schedule.    You should CALL YOUR PHYSICIAN if you develop:  Fever greater than 101 degrees F.  Pain not relieved by medication, or persistent nausea or vomiting.  Excessive bleeding (blood soaking through dressing) or unexpected drainage from the wound.  Extreme redness or swelling around the incision site, drainage of pus or foul smelling drainage.  Inability to urinate or empty your bladder within 8 hours.      You should call 911 if you develop problems with breathing or chest pain.  If you are unable to contact your doctor or surgical center, you should go to the nearest emergency room or urgent care center.      Physician's telephone #: Dr. Torres @ [phone]    If any  questions arise, call your doctor.  If your doctor is not available, please feel free to call the Surgical Center at [phone].     A registered nurse may call you a few days after your surgery to see how you are doing after your procedure.    MEDICATIONS: Resume taking daily medication.  Take prescribed pain medication with food.  If no medication is prescribed, you may take non-aspirin pain medication if needed.  PAIN MEDICATION CAN BE VERY CONSTIPATING.  Take a stool softener or laxative such as senokot, pericolace, or milk of magnesia if needed.    .  Last pain medication given at ____________________.    If your physician has prescribed pain medication that includes Acetaminophen (Tylenol), do not take additional Acetaminophen (Tylenol) while taking the prescribed medication.    Depression / Suicide Risk    As you are discharged from this [facility] facility, it is important to learn how to keep safe from harming yourself.    Recognize the warning signs:  Abrupt changes in personality, positive or negative- including increase in energy   Giving away possessions  Change in eating patterns- significant weight changes-  positive or negative  Change in sleeping patterns- unable to sleep or sleeping all the time   Unwillingness or inability to communicate  Depression  Unusual sadness, discouragement and loneliness  Talk of wanting to die  Neglect of personal appearance   Rebelliousness- reckless behavior  Withdrawal from people/activities they love  Confusion- inability to concentrate     If you or a loved one observes any of these behaviors or has concerns about self-harm, here's what you can do:  Talk about it- your feelings and reasons for harming yourself  Remove any means that you might use to hurt yourself (examples: pills, rope, extension cords, firearm)  Get professional help from the community (Mental Health, Substance Abuse, psychological counseling)  Do not be alone:Call your Safe Contact- someone  whom you trust who will be there for you.  Call your local CRISIS HOTLINE 216-6549 or 066-927-0213  Call your local Children's Mobile Crisis Response Team [place] (271) 848-2681 or www.NXVISION  Call the toll free National Suicide Prevention Hotlines   National Suicide Prevention Lifeline 547-344-YRJY (9039)  [place] [employer] Line Network 800-WVDBSVT (248-1609)

## 2022-06-07 NOTE — ANESTHESIA PROCEDURE NOTES
Airway    Date/Time: 6/7/2022 10:39 AM  Performed by: Chris Downs M.D.  Authorized by: Chris Downs M.D.     Location:  OR  Urgency:  Elective  Indications for Airway Management:  Anesthesia      Spontaneous Ventilation: absent    Sedation Level:  Deep  Preoxygenated: Yes    Final Airway Type:  Supraglottic airway  Final Supraglottic Airway:  Standard LMA    SGA Size:  3  Number of Attempts at Approach:  1

## 2022-06-08 NOTE — OP REPORT
DATE OF SERVICE:  06/07/2022     SURGEON:  Bernardino Torres MD     ASSISTANT:  TREVON Cagle     ANESTHESIOLOGIST:  Chris Downs MD     TYPE OF ANESTHETIC:  General endotracheal anesthesia plus local 0.5% Marcaine   with epinephrine.     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     PROCEDURE:  Radical left axillary node dissection, complete.     FINDINGS:  The patient is an 86-year-old female who recently was found to have   a skin lesion that was changing in the periclavicular area near the base of   the left neck.  Initial biopsy obtained revealed a melanoma about 1.1 mm in   depth.  She was taken to surgery on 03/29/2022 and underwent a wide excision   of the malignant melanoma with a layered closure.  She was set up for a   sentinel node biopsy.  [place] node injection was performed; however, the   radionuclide did not travel in any direction; therefore, the sentinel node   biopsy could not be performed.  Postoperatively, the patient did well and was   referred to the office of Dr. Easley to consider additional therapy.  Her   resection had showed a residual tumor that was 2.4 cm wide as well as a depth   of 6.5 mm, which was much more extensive than it had been seen on the initial   shave biopsy.  A PET scan was then performed and revealed at least 3 left   axillary nodes that were hyperactive consistent with metastatic disease.    Options again were discussed with the patient and multidisciplinary conference   was performed and it was decided that the patient should undergo a left   axillary node dissection to remove all known disease and then she could be   started on immunotherapy.  She presented today for that purpose.  She   underwent a radical left axillary node  dissection.  She had the large 3 cm   lymph node in the mid axilla as well as several smaller 2-3 cm nodes in level   2 and level 3 levels near the chest wall above the pectoralis minor muscle.    This operation was performed without apparent complications and a drain was   placed.     FINDINGS AND PROCEDURE:  The patient was brought to the operating room and   placed on the table in supine position.  General anesthetic was then provided   by Dr. Downs, the anesthesiologist.  The left axillary area was then prepped   and draped in the usual sterile fashion.  A timeout was called.  The correct   patient, correct diagnosis, correct surgery, and correct location of the   surgery were discussed and agreed upon.  She received preoperative IV   antibiotics.  A curvilinear incision was made below the hair-bearing portion   of the left axilla, approximately 5 cm long and was done with #15 blade and   then the incision was carried down into the subcutaneous tissue with the   electrocautery device.  [place] retractor was used to separate the skin and then   Vida retractor was used to retract the skin flaps.  Dissection was carried   down until the pectoralis major muscle was identified on the medial aspect of   the dissection.  The inferior dissection was carried out through the tail of   the breast down to the serratus anterior muscle and then laterally, dissection   was carried down to the anterior aspect of the latissimus dorsi muscle.  The   lateral border of the pectoralis major muscle was then followed superiorly,   dissecting all of the fatty tissue and lymphatic tissue from this level   laterally.  Tissue between the pectoralis minor and major muscle was also   dissected with the specimen.  Dissection was carried out superiorly until the   median pectoral nerve was identified.  Dissection was then carried across the   clavipectoral fascia exposing the left axilla.  Left axillary vein was then   easily identified.  The  patient was noted to have a large lymph node   underneath the left axillary vein and just superior to the pectoralis major   muscle towards the chest wall.  Careful dissection was then performed to   dissect out this lymph node and dissected away from the axillary vein and   chest wall structures.  The long thoracic nerve and the thoracodorsal nerve   was identified as well as the large venous tributary to the axillary vein.    The venous tributary was controlled with several sutures of 3-0 Vicryl before   it was divided.  Smaller vessels were controlled with the LigaSure device.    All lymphatic and jen tissue from the level of the left axillary vein   downward were then dissected with the LigaSure device.  There was a large 3x4   cm node noted.  It was dark in color in the mid axilla and again, several   large 2-3 cm nodes in the level 3 and level 2 area near the left axillary   vein.  All these lymph nodes and lymphatic tissue were then dissected away   from the nerves away from the chest wall.  The entire specimen was then   removed as an en bloc specimen and sent to pathology for further   characterization.  It was marked as left axillary node dissection.  Inspection   of the dissection revealed intact thoracodorsal and long thoracic nerves as   well as vascular structures.  The wound was irrigated with approximately 500   mL of warm normal saline.  The wound was then injected with 0.5% Marcaine   including pectoralis 1 and pectoralis 2 blocks using 0.5% Marcaine with   epinephrine.  A #19-Uzbek round fluted drain was then placed through the   inferior flap and brought out through the lateral chest wall just lateral to   the breast.  This drain was sutured to the skin with a 3-0 nylon suture.  The   drain was then placed into the left axilla.  The wound was then closed using a   running 3-0 Vicryl suture for the clavipectoral fascia, a running 3-0 Vicryl   suture for the dermis and a running 4-0 Monocryl  suture for the skin.    Steri-Strips and sterile dressings were applied.  A dressing was also placed   around the drain.  The patient tolerated the procedure well with no   complication.  Final sponge and needle count were correct.     An assistant was utilized for the surgery, it was necessary for the completion   of the operation.  The assistant assisted with retraction of vital   structures, so the axillary node dissection could be completed and also   assisted with the multilayer closure.        ______________________________  MD KATIE MAGALLON/BRYSON/MARITA    DD:  06/07/2022 18:05  DT:  06/07/2022 18:42    Job#:  [id number]    CC:MD Saskia Sheridan APRN FRANZ J. STADLER, MD

## 2022-06-14 ENCOUNTER — PATIENT MESSAGE (OUTPATIENT)
Dept: MEDICAL GROUP | Facility: PHYSICIAN GROUP | Age: 87
End: 2022-06-14
Payer: MEDICARE

## 2022-06-14 DIAGNOSIS — C43.59 MALIGNANT MELANOMA OF CHEST WALL (HCC): ICD-10-CM

## 2022-06-14 DIAGNOSIS — C43.4 MALIGNANT MELANOMA OF SKIN OF SCALP AND NECK (HCC): ICD-10-CM

## 2022-06-14 DIAGNOSIS — C77.3 SECONDARY AND UNSPECIFIED MALIGNANT NEOPLASM OF AXILLA AND UPPER LIMB LYMPH NODES (HCC): ICD-10-CM

## 2022-06-16 NOTE — PROGRESS NOTES
Patient requesting referral to [facility] oncology for second opinion.  Currently patient of Dr. Easley.

## 2022-06-17 ENCOUNTER — ANTICOAGULATION VISIT (OUTPATIENT)
Dept: MEDICAL GROUP | Facility: PHYSICIAN GROUP | Age: 87
End: 2022-06-17
Payer: MEDICARE

## 2022-06-17 VITALS — SYSTOLIC BLOOD PRESSURE: 135 MMHG | HEART RATE: 67 BPM | DIASTOLIC BLOOD PRESSURE: 76 MMHG

## 2022-06-17 DIAGNOSIS — Z79.01 LONG TERM (CURRENT) USE OF ANTICOAGULANTS: ICD-10-CM

## 2022-06-17 PROCEDURE — 99211 OFF/OP EST MAY X REQ PHY/QHP: CPT | Mod: 95 | Performed by: INTERNAL MEDICINE

## 2022-06-17 NOTE — PROGRESS NOTES
Target end date:Indefinite                                      Indication: DVT and A-fib                                      Drug: Xarelto                                      CHADsVASC = at least 4    Health Status Since Last Assessment   Patient denies any new relevant medical problems, ED visits or hospitalizations   Patient denies any embolic events (stroke/tia/systemic embolism)    Adherence with DOAC Therapy   Pt has not missed any doses in the average week    Bleeding Risk Assessment     Denies Epistaxis   Pt denies any excessive or unusual bleeding/hematomas.  Pt denies any GI bleeds or hematemesis.  Pt denies any concerning daily headache or sub dural hematoma symptoms.     Pt denies any hematuria    Latest Hemoglobin 13.6   ETOH overuse Denies     Creatinine Clearance/Renal Function     Latest ClCr 59 mL/min    Hepatic function   Latest LFTs WNL   Pt denies any history of liver dysfunction      Drug Interactions   Platelets: none   ASA/other antiplatelets none   NSAID none   Other drug interactions not of clinical concern.    X Verified no anticonvulsant or azole therapy, education provided for future use.     Examination   Blood Pressure Slightly elevated   Symptomatic hypotension none   Significant gait impairment/imbalance/high risk for falls? Uses a cane.     Final Assessment and Recommendations:   Patient appears stable from the anticoagulation standpoint.     Benefits of continued DOAC therapy outweigh risks for this patient   Recommend pt continue with current DOAC therapy      DOAC is  affordable     Other Actions: cmp/ cbc hemogram ordered prior to next visit    Follow up:   Will follow up with patient 6  months.      Toney Spicer, PerlitaD

## 2022-06-21 ENCOUNTER — APPOINTMENT (RX ONLY)
Dept: URBAN - METROPOLITAN AREA CLINIC 36 | Facility: CLINIC | Age: 87
Setting detail: DERMATOLOGY
End: 2022-06-21

## 2022-06-21 DIAGNOSIS — Z85.820 PERSONAL HISTORY OF MALIGNANT MELANOMA OF SKIN: ICD-10-CM

## 2022-06-21 PROBLEM — C44.321 SQUAMOUS CELL CARCINOMA OF SKIN OF NOSE: Status: ACTIVE | Noted: 2022-06-21

## 2022-06-21 PROCEDURE — 99212 OFFICE O/P EST SF 10 MIN: CPT | Mod: 25

## 2022-06-21 PROCEDURE — ? MOHS SURGERY

## 2022-06-21 PROCEDURE — 17312 MOHS ADDL STAGE: CPT

## 2022-06-21 PROCEDURE — 14060 TIS TRNFR E/N/E/L 10 SQ CM/<: CPT

## 2022-06-21 PROCEDURE — ? COUNSELING

## 2022-06-21 PROCEDURE — 17311 MOHS 1 STAGE H/N/HF/G: CPT

## 2022-06-21 NOTE — PROCEDURE: MOHS SURGERY
Mohs Case Number: [id number]
Previous Accession (Optional): [id number]
Biopsy Photograph Reviewed: Yes
Referring Physician (Optional): janine
Consent Type: Consent 1 (Standard)
Eye Shield Used: No
Surgeon Performing Repair (Optional): Melly
Initial Size Of Lesion: 0.3
Number Of Stages: 2
Primary Defect Length In Cm (Final Defect Size - Required For Flaps/Grafts): 0.9
Repair Type: Flap
Oculoplastic Surgeon (A): Minh
Oculoplastic Surgeon Procedure Text (A): After obtaining clear surgical margins the patient was sent to oculoplastics for surgical repair.  The patient understands they will receive post-surgical care and follow-up from the referring physician's office.
Otolaryngologist Procedure Text (A): After obtaining clear surgical margins the patient was sent to otolaryngology for surgical repair.  The patient understands they will receive post-surgical care and follow-up from the referring physician's office.
Plastic Surgeon Procedure Text (A): After obtaining clear surgical margins the patient was sent to plastics for surgical repair.  The patient understands they will receive post-surgical care and follow-up from the referring physician's office.
Mid-Level Procedure Text (A): After obtaining clear surgical margins the patient was sent to a mid-level provider for surgical repair.  The patient understands they will receive post-surgical care and follow-up from the mid-level provider.
Provider Procedure Text (A): After obtaining clear surgical margins the defect was repaired by another provider.
Asc Procedure Text (A): After obtaining clear surgical margins the patient was sent to an ASC for surgical repair.  The patient understands they will receive post-surgical care and follow-up from the ASC physician.
Simple / Intermediate / Complex Repair - Final Wound Length In Cm: 0
Suturegard Retention Suture: 2-0 Nylon
Retention Suture Bite Size: 3 mm
Length To Time In Minutes Device Was In Place: 10
Number Of Hemigard Strips Per Side: 1
Undermining Type: Entire Wound
Debridement Text: The wound edges were debrided prior to proceeding with the closure to facilitate wound healing.
Helical Rim Text: The closure involved the helical rim.
Vermilion Border Text: The closure involved the vermilion border.
Nostril Rim Text: The closure involved the nostril rim.
Retention Suture Text: Retention sutures were placed to support the closure and prevent dehiscence.
Flap Type: Burow's Advancement Flap
Secondary Defect Length In Cm (Required For Flaps): 3.2
Secondary Defect Width In Cm (Required For Flaps): 1.2
Area H Indication Text: Tumors in this location are included in Area H (eyelids, eyebrows, nose, lips, chin, ear, pre-auricular, post-auricular, temple, genitalia, hands, feet, ankles and areola).  Tissue conservation is critical in these anatomic locations.
Area M Indication Text: Tumors in this location are included in Area M (cheek, forehead, scalp, neck, jawline and pretibial skin).  Mohs surgery is indicated for tumors in these anatomic locations.
Area L Indication Text: Tumors in this location are included in Area L (trunk and extremities).  Mohs surgery is indicated for larger tumors, or tumors with aggressive histologic features, in these anatomic locations.
Depth Of Tumor Invasion (For Histology): cartilage
Perineural Invasion (For Histology - Be Specific If Possible): absent
Surgical Defect Length In Cm (Optional): 0.6
Special Stains Stage 1 - Results: Base On Clearance Noted Above
Stage 2: Additional Anesthesia Type: 1% lidocaine with 1:100,000 epinephrine and 408mcg clindamycin/ml and a 1:10 solution of 8.4% sodium bicarbonate
Stage 4: Additional Anesthesia Type: 1% lidocaine with epinephrine
Include Tumor Staging In Mohs Note?: Please Select the Appropriate Response
Staging Info: By selecting yes to the question above you will include information on AJCC 8 tumor staging in your Mohs note. Information on tumor staging will be automatically added for SCCs on the head and neck. AJCC 8 includes tumor size, tumor depth, perineural involvement and bone invasion.
Tumor Depth: Less than 6mm from granular layer and no invasion beyond the subcutaneous fat
Medical Necessity Statement: Based on my medical judgement, Mohs surgery is the most appropriate treatment for this cancer compared to other treatments.
Alternatives Discussed Intro (Do Not Add Period): I discussed alternative treatments to Mohs surgery and specifically discussed the risks and benefits of
Consent 1/Introductory Paragraph: The rationale for Mohs was explained to the patient and consent was obtained. The risks, benefits and alternatives to therapy were discussed in detail. Specifically, the risks of infection, scarring, bleeding, prolonged wound healing, incomplete removal, allergy to anesthesia, nerve injury and recurrence were addressed. Prior to the procedure, the treatment site was clearly identified and confirmed by the patient. All components of Universal Protocol/PAUSE Rule completed.
Consent 2/Introductory Paragraph: Mohs surgery was explained to the patient and consent was obtained. The risks, benefits and alternatives to therapy were discussed in detail. Specifically, the risks of infection, scarring, bleeding, prolonged wound healing, incomplete removal, allergy to anesthesia, nerve injury and recurrence were addressed. Prior to the procedure, the treatment site was clearly identified and confirmed by the patient. All components of Universal Protocol/PAUSE Rule completed.
Consent 3/Introductory Paragraph: I gave the patient a chance to ask questions they had about the procedure.  Following this I explained the Mohs procedure and consent was obtained. The risks, benefits and alternatives to therapy were discussed in detail. Specifically, the risks of infection, scarring, bleeding, prolonged wound healing, incomplete removal, allergy to anesthesia, nerve injury and recurrence were addressed. Prior to the procedure, the treatment site was clearly identified and confirmed by the patient. All components of Universal Protocol/PAUSE Rule completed.
Consent (Temporal Branch)/Introductory Paragraph: The rationale for Mohs was explained to the patient and consent was obtained. The risks, benefits and alternatives to therapy were discussed in detail. Specifically, the risks of damage to the temporal branch of the facial nerve, infection, scarring, bleeding, prolonged wound healing, incomplete removal, allergy to anesthesia, and recurrence were addressed. Prior to the procedure, the treatment site was clearly identified and confirmed by the patient. All components of Universal Protocol/PAUSE Rule completed.
Consent (Marginal Mandibular)/Introductory Paragraph: The rationale for Mohs was explained to the patient and consent was obtained. The risks, benefits and alternatives to therapy were discussed in detail. Specifically, the risks of damage to the marginal mandibular branch of the facial nerve, infection, scarring, bleeding, prolonged wound healing, incomplete removal, allergy to anesthesia, and recurrence were addressed. Prior to the procedure, the treatment site was clearly identified and confirmed by the patient. All components of Universal Protocol/PAUSE Rule completed.
Consent (Spinal Accessory)/Introductory Paragraph: The rationale for Mohs was explained to the patient and consent was obtained. The risks, benefits and alternatives to therapy were discussed in detail. Specifically, the risks of damage to the spinal accessory nerve, infection, scarring, bleeding, prolonged wound healing, incomplete removal, allergy to anesthesia, and recurrence were addressed. Prior to the procedure, the treatment site was clearly identified and confirmed by the patient. All components of Universal Protocol/PAUSE Rule completed.
Consent (Near Eyelid Margin)/Introductory Paragraph: The rationale for Mohs was explained to the patient and consent was obtained. The risks, benefits and alternatives to therapy were discussed in detail. Specifically, the risks of ectropion or eyelid deformity, infection, scarring, bleeding, prolonged wound healing, incomplete removal, allergy to anesthesia, nerve injury and recurrence were addressed. Prior to the procedure, the treatment site was clearly identified and confirmed by the patient. All components of Universal Protocol/PAUSE Rule completed.
Consent (Ear)/Introductory Paragraph: The rationale for Mohs was explained to the patient and consent was obtained. The risks, benefits and alternatives to therapy were discussed in detail. Specifically, the risks of ear deformity, infection, scarring, bleeding, prolonged wound healing, incomplete removal, allergy to anesthesia, nerve injury and recurrence were addressed. Prior to the procedure, the treatment site was clearly identified and confirmed by the patient. All components of Universal Protocol/PAUSE Rule completed.
Consent (Nose)/Introductory Paragraph: The rationale for Mohs was explained to the patient and consent was obtained. The risks, benefits and alternatives to therapy were discussed in detail. Specifically, the risks of nasal deformity, changes in the flow of air through the nose, infection, scarring, bleeding, prolonged wound healing, incomplete removal, allergy to anesthesia, nerve injury and recurrence were addressed. Prior to the procedure, the treatment site was clearly identified and confirmed by the patient. All components of Universal Protocol/PAUSE Rule completed.
Consent (Lip)/Introductory Paragraph: The rationale for Mohs was explained to the patient and consent was obtained. The risks, benefits and alternatives to therapy were discussed in detail. Specifically, the risks of lip deformity, changes in the oral aperture, infection, scarring, bleeding, prolonged wound healing, incomplete removal, allergy to anesthesia, nerve injury and recurrence were addressed. Prior to the procedure, the treatment site was clearly identified and confirmed by the patient. All components of Universal Protocol/PAUSE Rule completed.
Consent (Scalp)/Introductory Paragraph: The rationale for Mohs was explained to the patient and consent was obtained. The risks, benefits and alternatives to therapy were discussed in detail. Specifically, the risks of changes in hair growth pattern secondary to repair, infection, scarring, bleeding, prolonged wound healing, incomplete removal, allergy to anesthesia, nerve injury and recurrence were addressed. Prior to the procedure, the treatment site was clearly identified and confirmed by the patient. All components of Universal Protocol/PAUSE Rule completed.
Detail Level: Detailed
Postop Diagnosis: same
Anesthesia Type: 0.5% lidocaine with 1:200,000 epinephrine and a 1:10 solution of 8.4% sodium bicarbonate and 408mcg clindamycin/ml
Anesthesia Volume In Cc: 6
Hemostasis: Electrocautery
Estimated Blood Loss (Cc): less than 5 cc
Repair Anesthesia Method: local infiltration
Brow Lift Text: A midfrontal incision was made medially to the defect to allow access to the tissues just superior to the left eyebrow. Following careful dissection inferiorly in a supraperiosteal plane to the level of the left eyebrow, several 3-0 monocryl sutures were used to resuspend the eyebrow orbicularis oculi muscular unit to the superior frontal bone periosteum. This resulted in an appropriate reapproximation of static eyebrow symmetry and correction of the left brow ptosis.
Deep Sutures: 5-0 Polysorb
Epidermal Sutures: 5-0 Prolene
Epidermal Closure: running cuticular
Suturegard Intro: Intraoperative tissue expansion was performed, utilizing the SUTUREGARD device, in order to reduce wound tension.
Suturegard Body: The suture ends were repeatedly re-tightened and re-clamped to achieve the desired tissue expansion.
Hemigard Intro: Due to skin fragility and wound tension, it was decided to use HEMIGARD adhesive retention suture devices to permit a linear closure. The skin was cleaned and dried for a 6cm distance away from the wound. Excessive hair, if present, was removed to allow for adhesion.
Hemigard Postcare Instructions: The HEMIGARD strips are to remain completely dry for at least 5-7 days.
Donor Site Anesthesia Type: same as repair anesthesia
Graft Basting Suture (Optional): 5-0 Fast Absorbing Gut
Graft Donor Site Epidermal Sutures (Optional): 5-0 Ethibond
Epidermal Closure Graft Donor Site (Optional): simple interrupted
Graft Donor Site Bandage (Optional-Leave Blank If You Don't Want In Note): Aquaphor and telefa placed on wound. Pressure dressing applied to donor site
Closure 2 Information: This tab is for additional flaps and grafts, including complex repair and grafts and complex repair and flaps. You can also specify a different location for the additional defect, if the location is the same you do not need to select a new one. We will insert the automated text for the repair you select below just as we do for solitary flaps and grafts. Please note that at this time if you select a location with a different insurance zone you will need to override the ICD10 and CPT if appropriate.
Closure 3 Information: This tab is for additional flaps and grafts above and beyond our usual structured repairs.  Please note if you enter information here it will not currently bill and you will need to add the billing information manually.
Wound Care: Aquaphor
Dressing: dry sterile dressing
Wound Care (No Sutures): Petrolatum
Suture Removal: 7 days
Unna Boot Text: An Unna boot was placed to help immobilize the limb and facilitate more rapid healing.
Home Suture Removal Text: Patient was provided instructions on removing sutures and will remove their sutures at home.  If they have any questions or difficulties they will call the office.
Post-Care Instructions: I reviewed with the patient in detail post-care instructions. Patient is not to engage in any heavy lifting, exercise, or swimming for the next 14 days. Should the patient develop any fevers, chills, bleeding, severe pain patient will contact the office immediately.
Pain Refusal Text: I offered to prescribe pain medication but the patient refused to take this medication.
Mauc Instructions: By selecting yes to the question below the MAUC number will be added into the note.  This will be calculated automatically based on the diagnosis chosen, the size entered, the body zone selected (H,M,L) and the specific indications you chose. You will also have the option to override the Mohs AUC if you disagree with the automatically calculated number and this option is found in the Case Summary tab.
Where Do You Want The Question To Include Opioid Counseling Located?: Case Summary Tab
Eye Protection Verbiage: Before proceeding with the stage, a plastic scleral shield was inserted. The globe was anesthetized with a few drops of 1% lidocaine with 1:100,000 epinephrine. Then, an appropriate sized scleral shield was chosen and coated with lacrilube ointment. The shield was gently inserted and left in place for the duration of each stage. After the stage was completed, the shield was gently removed.
Mohs Method Verbiage: An incision at a 45 degree angle following the standard Mohs approach was done and the specimen was harvested as a microscopic controlled layer.
Surgeon/Pathologist Verbiage (Will Incorporate Name Of Surgeon From Intro If Not Blank): operated in two distinct and integrated capacities as the surgeon and pathologist.
Mohs Histo Method Verbiage: Each section was then chromacoded and processed in the Mohs lab using the Mohs protocol and submitted for frozen section.
Subsequent Stages Histo Method Verbiage: Using a similar technique to that described above, a thin layer of tissue was removed from all areas where tumor was visible on the previous stage.  The tissue was again oriented, mapped, dyed, and processed as above.
Mohs Rapid Report Verbiage: The area of clinically evident tumor was marked with skin marking ink and appropriately hatched.  The initial incision was made following the Mohs approach through the skin.  The specimen was taken to the lab, divided into the necessary number of pieces, chromacoded and processed according to the Mohs protocol.  This was repeated in successive stages until a tumor free defect was achieved.
Complex Repair Preamble Text (Leave Blank If You Do Not Want): Extensive wide undermining was performed at least 2 cm in all directions.
Intermediate Repair Preamble Text (Leave Blank If You Do Not Want): Undermining was performed with blunt dissection.
M-Plasty Complex Repair Preamble Text (Leave Blank If You Do Not Want): Extensive wide undermining was performed.
Non-Graft Cartilage Fenestration Text: The cartilage was fenestrated with a 2mm punch biopsy to help facilitate healing.
Graft Cartilage Fenestration Text: The cartilage was fenestrated with a 2mm punch biopsy to help facilitate graft survival and healing.
Secondary Intention Text (Leave Blank If You Do Not Want): The defect will heal with secondary intention.
No Repair - Repaired With Adjacent Surgical Defect Text (Leave Blank If You Do Not Want): After obtaining clear surgical margins the defect was repaired concurrently with another surgical defect which was in close approximation.
Adjacent Tissue Transfer Text: The defect edges were debeveled with a #15 scalpel blade.  Given the location of the defect and the proximity to free margins an adjacent tissue transfer was deemed most appropriate.  Using a sterile surgical marker, an appropriate flap was drawn incorporating the defect and placing the expected incisions within the relaxed skin tension lines where possible.    The area thus outlined was incised deep to adipose tissue with a #15 scalpel blade.  The skin margins were undermined to an appropriate distance in all directions utilizing iris scissors.
Advancement Flap (Single) Text: The defect edges were debeveled with a #15 scalpel blade.  Given the location of the defect and the proximity to free margins a single advancement flap was deemed most appropriate.  Using a sterile surgical marker, an appropriate advancement flap was drawn incorporating the defect and placing the expected incisions within the relaxed skin tension lines where possible.    The area thus outlined was incised deep to adipose tissue with a #15 scalpel blade.  The skin margins were undermined to an appropriate distance in all directions utilizing iris scissors.
Advancement Flap (Double) Text: The defect edges were debeveled with a #15 scalpel blade.  Given the location of the defect and the proximity to free margins a double advancement flap was deemed most appropriate.  Using a sterile surgical marker, the appropriate advancement flaps were drawn incorporating the defect and placing the expected incisions within the relaxed skin tension lines where possible.    The area thus outlined was incised deep to adipose tissue with a #15 scalpel blade.  The skin margins were undermined to an appropriate distance in all directions utilizing iris scissors.
Burow's Advancement Flap Text: The defect edges were debeveled with a #15 scalpel blade.  Given the location of the defect and the proximity to free margins a Burow's advancement flap was deemed most appropriate.  Using a sterile surgical marker, the appropriate advancement flap was drawn incorporating the defect and placing the expected incisions within the relaxed skin tension lines where possible.    The area thus outlined was incised deep to adipose tissue with a #15 scalpel blade.  The skin margins were undermined to an appropriate distance in all directions utilizing iris scissors.
Chonodrocutaneous Helical Advancement Flap Text: The defect edges were debeveled with a #15 scalpel blade.  Given the location of the defect and the proximity to free margins a chondrocutaneous helical advancement flap was deemed most appropriate.  Using a sterile surgical marker, the appropriate advancement flap was drawn incorporating the defect and placing the expected incisions within the relaxed skin tension lines where possible.    The area thus outlined was incised deep to adipose tissue with a #15 scalpel blade.  The skin margins were undermined to an appropriate distance in all directions utilizing iris scissors.
Crescentic Advancement Flap Text: The defect edges were debeveled with a #15 scalpel blade.  Given the location of the defect and the proximity to free margins a crescentic advancement flap was deemed most appropriate.  Using a sterile surgical marker, the appropriate advancement flap was drawn incorporating the defect and placing the expected incisions within the relaxed skin tension lines where possible.    The area thus outlined was incised deep to adipose tissue with a #15 scalpel blade.  The skin margins were undermined to an appropriate distance in all directions utilizing iris scissors.
A-T Advancement Flap Text: The defect edges were debeveled with a #15 scalpel blade.  Given the location of the defect, shape of the defect and the proximity to free margins an A-T advancement flap was deemed most appropriate.  Using a sterile surgical marker, an appropriate advancement flap was drawn incorporating the defect and placing the expected incisions within the relaxed skin tension lines where possible.    The area thus outlined was incised deep to adipose tissue with a #15 scalpel blade.  The skin margins were undermined to an appropriate distance in all directions utilizing iris scissors.
O-T Advancement Flap Text: The defect edges were debeveled with a #15 scalpel blade.  Given the location of the defect, shape of the defect and the proximity to free margins an O-T advancement flap was deemed most appropriate.  Using a sterile surgical marker, an appropriate advancement flap was drawn incorporating the defect and placing the expected incisions within the relaxed skin tension lines where possible.    The area thus outlined was incised deep to adipose tissue with a #15 scalpel blade.  The skin margins were undermined to an appropriate distance in all directions utilizing iris scissors.
O-L Flap Text: The defect edges were debeveled with a #15 scalpel blade.  Given the location of the defect, shape of the defect and the proximity to free margins an O-L flap was deemed most appropriate.  Using a sterile surgical marker, an appropriate advancement flap was drawn incorporating the defect and placing the expected incisions within the relaxed skin tension lines where possible.    The area thus outlined was incised deep to adipose tissue with a #15 scalpel blade.  The skin margins were undermined to an appropriate distance in all directions utilizing iris scissors.
O-Z Flap Text: The defect edges were debeveled with a #15 scalpel blade.  Given the location of the defect, shape of the defect and the proximity to free margins an O-Z flap was deemed most appropriate.  Using a sterile surgical marker, an appropriate transposition flap was drawn incorporating the defect and placing the expected incisions within the relaxed skin tension lines where possible. The area thus outlined was incised deep to adipose tissue with a #15 scalpel blade.  The skin margins were undermined to an appropriate distance in all directions utilizing iris scissors.
Double O-Z Flap Text: The defect edges were debeveled with a #15 scalpel blade.  Given the location of the defect, shape of the defect and the proximity to free margins a Double O-Z flap was deemed most appropriate.  Using a sterile surgical marker, an appropriate transposition flap was drawn incorporating the defect and placing the expected incisions within the relaxed skin tension lines where possible. The area thus outlined was incised deep to adipose tissue with a #15 scalpel blade.  The skin margins were undermined to an appropriate distance in all directions utilizing iris scissors.
V-Y Flap Text: The defect edges were debeveled with a #15 scalpel blade.  Given the location of the defect, shape of the defect and the proximity to free margins a V-Y flap was deemed most appropriate.  Using a sterile surgical marker, an appropriate advancement flap was drawn incorporating the defect and placing the expected incisions within the relaxed skin tension lines where possible.    The area thus outlined was incised deep to adipose tissue with a #15 scalpel blade.  The skin margins were undermined to an appropriate distance in all directions utilizing iris scissors.
Advancement-Rotation Flap Text: The defect edges were debeveled with a #15 scalpel blade.  Given the location of the defect, shape of the defect and the proximity to free margins an advancement-rotation flap was deemed most appropriate.  Using a sterile surgical marker, an appropriate flap was drawn incorporating the defect and placing the expected incisions within the relaxed skin tension lines where possible. The area thus outlined was incised deep to adipose tissue with a #15 scalpel blade.  The skin margins were undermined to an appropriate distance in all directions utilizing iris scissors.
Mercedes Flap Text: The defect edges were debeveled with a #15 scalpel blade.  Given the location of the defect, shape of the defect and the proximity to free margins a Mercedes flap was deemed most appropriate.  Using a sterile surgical marker, an appropriate advancement flap was drawn incorporating the defect and placing the expected incisions within the relaxed skin tension lines where possible. The area thus outlined was incised deep to adipose tissue with a #15 scalpel blade.  The skin margins were undermined to an appropriate distance in all directions utilizing iris scissors.
Modified Advancement Flap Text: The defect edges were debeveled with a #15 scalpel blade.  Given the location of the defect, shape of the defect and the proximity to free margins a modified advancement flap was deemed most appropriate.  Using a sterile surgical marker, an appropriate advancement flap was drawn incorporating the defect and placing the expected incisions within the relaxed skin tension lines where possible.    The area thus outlined was incised deep to adipose tissue with a #15 scalpel blade.  The skin margins were undermined to an appropriate distance in all directions utilizing iris scissors.
Mucosal Advancement Flap Text: Given the location of the defect, shape of the defect and the proximity to free margins a mucosal advancement flap was deemed most appropriate. Incisions were made with a 15 blade scalpel in the appropriate fashion along the cutaneous vermilion border and the mucosal lip. The remaining actinically damaged mucosal tissue was excised.  The mucosal advancement flap was then elevated to the gingival sulcus with care taken to preserve the neurovascular structures and advanced into the primary defect. Care was taken to ensure that precise realignment of the vermilion border was achieved.
Peng Advancement Flap Text: The defect edges were debeveled with a #15 scalpel blade.  Given the location of the defect, shape of the defect and the proximity to free margins a Peng advancement flap was deemed most appropriate.  Using a sterile surgical marker, an appropriate advancement flap was drawn incorporating the defect and placing the expected incisions within the relaxed skin tension lines where possible. The area thus outlined was incised deep to adipose tissue with a #15 scalpel blade.  The skin margins were undermined to an appropriate distance in all directions utilizing iris scissors.
Hatchet Flap Text: The defect edges were debeveled with a #15 scalpel blade.  Given the location of the defect, shape of the defect and the proximity to free margins a hatchet flap based from the glabella was deemed most appropriate.  Using a sterile surgical marker, an appropriate glabellar hatchet flap was drawn incorporating the defect and placing the expected incisions within the relaxed skin tension lines where possible.    The area thus outlined was incised deep to adipose tissue with a #15 scalpel blade.  The skin margins were undermined to an appropriate distance in all directions utilizing iris scissors.
Rotation Flap Text: The defect edges were debeveled with a #15 scalpel blade.  Given the location of the defect, shape of the defect and the proximity to free margins a rotation flap was deemed most appropriate.  Using a sterile surgical marker, an appropriate rotation flap was drawn incorporating the defect and placing the expected incisions within the relaxed skin tension lines where possible.    The area thus outlined was incised deep to adipose tissue with a #15 scalpel blade.  The skin margins were undermined to an appropriate distance in all directions utilizing iris scissors.
Spiral Flap Text: The defect edges were debeveled with a #15 scalpel blade.  Given the location of the defect, shape of the defect and the proximity to free margins a spiral flap was deemed most appropriate.  Using a sterile surgical marker, an appropriate rotation flap was drawn incorporating the defect and placing the expected incisions within the relaxed skin tension lines where possible. The area thus outlined was incised deep to adipose tissue with a #15 scalpel blade.  The skin margins were undermined to an appropriate distance in all directions utilizing iris scissors.
Staged Advancement Flap Text: The defect edges were debeveled with a #15 scalpel blade.  Given the location of the defect, shape of the defect and the proximity to free margins a staged advancement flap was deemed most appropriate.  Using a sterile surgical marker, an appropriate advancement flap was drawn incorporating the defect and placing the expected incisions within the relaxed skin tension lines where possible. The area thus outlined was incised deep to adipose tissue with a #15 scalpel blade.  The skin margins were undermined to an appropriate distance in all directions utilizing iris scissors.
Star Wedge Flap Text: The defect edges were debeveled with a #15 scalpel blade.  Given the location of the defect, shape of the defect and the proximity to free margins a star wedge flap was deemed most appropriate.  Using a sterile surgical marker, an appropriate rotation flap was drawn incorporating the defect and placing the expected incisions within the relaxed skin tension lines where possible. The area thus outlined was incised deep to adipose tissue with a #15 scalpel blade.  The skin margins were undermined to an appropriate distance in all directions utilizing iris scissors.
Transposition Flap Text: The defect edges were debeveled with a #15 scalpel blade.  Given the location of the defect and the proximity to free margins a transposition flap was deemed most appropriate.  Using a sterile surgical marker, an appropriate transposition flap was drawn incorporating the defect.    The area thus outlined was incised deep to adipose tissue with a #15 scalpel blade.  The skin margins were undermined to an appropriate distance in all directions utilizing iris scissors.
Muscle Hinge Flap Text: The defect edges were debeveled with a #15 scalpel blade.  Given the size, depth and location of the defect and the proximity to free margins a muscle hinge flap was deemed most appropriate.  Using a sterile surgical marker, an appropriate hinge flap was drawn incorporating the defect. The area thus outlined was incised with a #15 scalpel blade.  The skin margins were undermined to an appropriate distance in all directions utilizing iris scissors.
Mustarde Flap Text: The defect edges were debeveled with a #15 scalpel blade.  Given the size, depth and location of the defect and the proximity to free margins a Mustarde flap was deemed most appropriate.  Using a sterile surgical marker, an appropriate flap was drawn incorporating the defect. The area thus outlined was incised with a #15 scalpel blade.  The skin margins were undermined to an appropriate distance in all directions utilizing iris scissors.
Nasal Turnover Hinge Flap Text: The defect edges were debeveled with a #15 scalpel blade.  Given the size, depth, location of the defect and the defect being full thickness a nasal turnover hinge flap was deemed most appropriate.  Using a sterile surgical marker, an appropriate hinge flap was drawn incorporating the defect. The area thus outlined was incised with a #15 scalpel blade. The flap was designed to recreate the nasal mucosal lining and the alar rim. The skin margins were undermined to an appropriate distance in all directions utilizing iris scissors.
Nasalis-Muscle-Based Myocutaneous Island Pedicle Flap Text: Using a #15 blade, an incision was made around the donor flap to the level of the nasalis muscle. Wide lateral undermining was then performed in both the subcutaneous plane above the nasalis muscle, and in a submuscular plane just above periosteum. This allowed the formation of a free nasalis muscle axial pedicle (based on the angular artery) which was still attached to the actual cutaneous flap, increasing its mobility and vascular viability. Hemostasis was obtained with pinpoint electrocoagulation. The flap was mobilized into position and the pivotal anchor points positioned and stabilized with buried interrupted sutures. Subcutaneous and dermal tissues were closed in a multilayered fashion with sutures. Tissue redundancies were excised, and the epidermal edges were apposed without significant tension and sutured with sutures.
Orbicularis Oris Muscle Flap Text: The defect edges were debeveled with a #15 scalpel blade.  Given that the defect affected the competency of the oral sphincter an orbicularis oris muscle flap was deemed most appropriate to restore this competency and normal muscle function.  Using a sterile surgical marker, an appropriate flap was drawn incorporating the defect. The area thus outlined was incised with a #15 scalpel blade.
Melolabial Transposition Flap Text: The defect edges were debeveled with a #15 scalpel blade.  Given the location of the defect and the proximity to free margins a melolabial flap was deemed most appropriate.  Using a sterile surgical marker, an appropriate melolabial transposition flap was drawn incorporating the defect.    The area thus outlined was incised deep to adipose tissue with a #15 scalpel blade.  The skin margins were undermined to an appropriate distance in all directions utilizing iris scissors.
Rhombic Flap Text: The defect edges were debeveled with a #15 scalpel blade.  Given the location of the defect and the proximity to free margins a rhombic flap was deemed most appropriate.  Using a sterile surgical marker, an appropriate rhombic flap was drawn incorporating the defect.    The area thus outlined was incised deep to adipose tissue with a #15 scalpel blade.  The skin margins were undermined to an appropriate distance in all directions utilizing iris scissors.
Rhomboid Transposition Flap Text: The defect edges were debeveled with a #15 scalpel blade.  Given the location of the defect and the proximity to free margins a rhomboid transposition flap was deemed most appropriate.  Using a sterile surgical marker, an appropriate rhomboid flap was drawn incorporating the defect.    The area thus outlined was incised deep to adipose tissue with a #15 scalpel blade.  The skin margins were undermined to an appropriate distance in all directions utilizing iris scissors.
Bi-Rhombic Flap Text: The defect edges were debeveled with a #15 scalpel blade.  Given the location of the defect and the proximity to free margins a bi-rhombic flap was deemed most appropriate.  Using a sterile surgical marker, an appropriate rhombic flap was drawn incorporating the defect. The area thus outlined was incised deep to adipose tissue with a #15 scalpel blade.  The skin margins were undermined to an appropriate distance in all directions utilizing iris scissors.
Helical Rim Advancement Flap Text: The defect edges were debeveled with a #15 blade scalpel.  Given the location of the defect and the proximity to free margins (helical rim) a double helical rim advancement flap was deemed most appropriate.  Using a sterile surgical marker, the appropriate advancement flaps were drawn incorporating the defect and placing the expected incisions between the helical rim and antihelix where possible.  The area thus outlined was incised through and through with a #15 scalpel blade.  With a skin hook and iris scissors, the flaps were gently and sharply undermined and freed up.
Bilateral Helical Rim Advancement Flap Text: The defect edges were debeveled with a #15 blade scalpel.  Given the location of the defect and the proximity to free margins (helical rim) a bilateral helical rim advancement flap was deemed most appropriate.  Using a sterile surgical marker, the appropriate advancement flaps were drawn incorporating the defect and placing the expected incisions between the helical rim and antihelix where possible.  The area thus outlined was incised through and through with a #15 scalpel blade.  With a skin hook and iris scissors, the flaps were gently and sharply undermined and freed up.
Ear Star Wedge Flap Text: The defect edges were debeveled with a #15 blade scalpel.  Given the location of the defect and the proximity to free margins (helical rim) an ear star wedge flap was deemed most appropriate.  Using a sterile surgical marker, the appropriate flap was drawn incorporating the defect and placing the expected incisions between the helical rim and antihelix where possible.  The area thus outlined was incised through and through with a #15 scalpel blade.
Banner Transposition Flap Text: The defect edges were debeveled with a #15 scalpel blade.  Given the location of the defect and the proximity to free margins a Banner transposition flap was deemed most appropriate.  Using a sterile surgical marker, an appropriate flap drawn around the defect. The area thus outlined was incised deep to adipose tissue with a #15 scalpel blade.  The skin margins were undermined to an appropriate distance in all directions utilizing iris scissors.
Bilobed Flap Text: The defect edges were debeveled with a #15 scalpel blade.  Given the location of the defect and the proximity to free margins a bilobe flap was deemed most appropriate.  Using a sterile surgical marker, an appropriate bilobe flap drawn around the defect.    The area thus outlined was incised deep to adipose tissue with a #15 scalpel blade.  The skin margins were undermined to an appropriate distance in all directions utilizing iris scissors.
Bilobed Transposition Flap Text: The defect edges were debeveled with a #15 scalpel blade.  Given the location of the defect and the proximity to free margins a bilobed transposition flap was deemed most appropriate.  Using a sterile surgical marker, an appropriate bilobe flap drawn around the defect.    The area thus outlined was incised deep to adipose tissue with a #15 scalpel blade.  The skin margins were undermined to an appropriate distance in all directions utilizing iris scissors.
Trilobed Flap Text: The defect edges were debeveled with a #15 scalpel blade.  Given the location of the defect and the proximity to free margins a trilobed flap was deemed most appropriate.  Using a sterile surgical marker, an appropriate trilobed flap drawn around the defect.    The area thus outlined was incised deep to adipose tissue with a #15 scalpel blade.  The skin margins were undermined to an appropriate distance in all directions utilizing iris scissors.
Dorsal Nasal Flap Text: The defect edges were debeveled with a #15 scalpel blade.  Given the location of the defect and the proximity to free margins a dorsal nasal flap,based upon the glabellar folds, was deemed most appropriate.  Using a sterile surgical marker, an appropriate dorsal nasal flap was drawn around the defect.    The area thus outlined was incised deep to adipose tissue with a #15 scalpel blade.  The skin margins were undermined to an appropriate distance in all directions utilizing iris scissors.
Island Pedicle Flap Text: The defect edges were debeveled with a #15 scalpel blade.  Given the location of the defect, shape of the defect and the proximity to free margins an island pedicle advancement flap was deemed most appropriate.  Using a sterile surgical marker, an appropriate advancement flap was drawn incorporating the defect, outlining the appropriate donor tissue and placing the expected incisions within the relaxed skin tension lines where possible.    The area thus outlined was incised deep to adipose tissue with a #15 scalpel blade.  The skin margins were undermined to an appropriate distance in all directions around the primary defect and laterally outward around the island pedicle utilizing iris scissors.  There was minimal undermining beneath the pedicle flap.
Island Pedicle Flap With Canthal Suspension Text: The defect edges were debeveled with a #15 scalpel blade.  Given the location of the defect, shape of the defect and the proximity to free margins an island pedicle advancement flap was deemed most appropriate.  Using a sterile surgical marker, an appropriate advancement flap was drawn incorporating the defect, outlining the appropriate donor tissue and placing the expected incisions within the relaxed skin tension lines where possible. The area thus outlined was incised deep to adipose tissue with a #15 scalpel blade.  The skin margins were undermined to an appropriate distance in all directions around the primary defect and laterally outward around the island pedicle utilizing iris scissors.  There was minimal undermining beneath the pedicle flap. A suspension suture was placed in the canthal tendon to prevent tension and prevent ectropion.
Alar Island Pedicle Flap Text: The defect edges were debeveled with a #15 scalpel blade.  Given the location of the defect, shape of the defect and the proximity to the alar rim an island pedicle advancement flap was deemed most appropriate.  Using a sterile surgical marker, an appropriate advancement flap was drawn incorporating the defect, outlining the appropriate donor tissue and placing the expected incisions within the nasal ala running parallel to the alar rim. The area thus outlined was incised with a #15 scalpel blade.  The skin margins were undermined minimally to an appropriate distance in all directions around the primary defect and laterally outward around the island pedicle utilizing iris scissors.  There was minimal undermining beneath the pedicle flap.
Double Island Pedicle Flap Text: The defect edges were debeveled with a #15 scalpel blade.  Given the location of the defect, shape of the defect and the proximity to free margins a double island pedicle advancement flap was deemed most appropriate.  Using a sterile surgical marker, an appropriate advancement flap was drawn incorporating the defect, outlining the appropriate donor tissue and placing the expected incisions within the relaxed skin tension lines where possible.    The area thus outlined was incised deep to adipose tissue with a #15 scalpel blade.  The skin margins were undermined to an appropriate distance in all directions around the primary defect and laterally outward around the island pedicle utilizing iris scissors.  There was minimal undermining beneath the pedicle flap.
Island Pedicle Flap-Requiring Vessel Identification Text: The defect edges were debeveled with a #15 scalpel blade.  Given the location of the defect, shape of the defect and the proximity to free margins an island pedicle advancement flap was deemed most appropriate.  Using a sterile surgical marker, an appropriate advancement flap was drawn, based on the axial vessel mentioned above, incorporating the defect, outlining the appropriate donor tissue and placing the expected incisions within the relaxed skin tension lines where possible.    The area thus outlined was incised deep to adipose tissue with a #15 scalpel blade.  The skin margins were undermined to an appropriate distance in all directions around the primary defect and laterally outward around the island pedicle utilizing iris scissors.  There was minimal undermining beneath the pedicle flap.
Keystone Flap Text: The defect edges were debeveled with a #15 scalpel blade.  Given the location of the defect, shape of the defect a keystone flap was deemed most appropriate.  Using a sterile surgical marker, an appropriate keystone flap was drawn incorporating the defect, outlining the appropriate donor tissue and placing the expected incisions within the relaxed skin tension lines where possible. The area thus outlined was incised deep to adipose tissue with a #15 scalpel blade.  The skin margins were undermined to an appropriate distance in all directions around the primary defect and laterally outward around the flap utilizing iris scissors.
O-T Plasty Text: The defect edges were debeveled with a #15 scalpel blade.  Given the location of the defect, shape of the defect and the proximity to free margins an O-T plasty was deemed most appropriate.  Using a sterile surgical marker, an appropriate O-T plasty was drawn incorporating the defect and placing the expected incisions within the relaxed skin tension lines where possible.    The area thus outlined was incised deep to adipose tissue with a #15 scalpel blade.  The skin margins were undermined to an appropriate distance in all directions utilizing iris scissors.
O-Z Plasty Text: The defect edges were debeveled with a #15 scalpel blade.  Given the location of the defect, shape of the defect and the proximity to free margins an O-Z plasty (double transposition flap) was deemed most appropriate.  Using a sterile surgical marker, the appropriate transposition flaps were drawn incorporating the defect and placing the expected incisions within the relaxed skin tension lines where possible.    The area thus outlined was incised deep to adipose tissue with a #15 scalpel blade.  The skin margins were undermined to an appropriate distance in all directions utilizing iris scissors.  Hemostasis was achieved with electrocautery.  The flaps were then transposed into place, one clockwise and the other counterclockwise, and anchored with interrupted buried subcutaneous sutures.
Double O-Z Plasty Text: The defect edges were debeveled with a #15 scalpel blade.  Given the location of the defect, shape of the defect and the proximity to free margins a Double O-Z plasty (double transposition flap) was deemed most appropriate.  Using a sterile surgical marker, the appropriate transposition flaps were drawn incorporating the defect and placing the expected incisions within the relaxed skin tension lines where possible. The area thus outlined was incised deep to adipose tissue with a #15 scalpel blade.  The skin margins were undermined to an appropriate distance in all directions utilizing iris scissors.  Hemostasis was achieved with electrocautery.  The flaps were then transposed into place, one clockwise and the other counterclockwise, and anchored with interrupted buried subcutaneous sutures.
V-Y Plasty Text: The defect edges were debeveled with a #15 scalpel blade.  Given the location of the defect, shape of the defect and the proximity to free margins an V-Y advancement flap was deemed most appropriate.  Using a sterile surgical marker, an appropriate advancement flap was drawn incorporating the defect and placing the expected incisions within the relaxed skin tension lines where possible.    The area thus outlined was incised deep to adipose tissue with a #15 scalpel blade.  The skin margins were undermined to an appropriate distance in all directions utilizing iris scissors.
H Plasty Text: Given the location of the defect, shape of the defect and the proximity to free margins a H-plasty was deemed most appropriate for repair.  Using a sterile surgical marker, the appropriate advancement arms of the H-plasty were drawn incorporating the defect and placing the expected incisions within the relaxed skin tension lines where possible. The area thus outlined was incised deep to adipose tissue with a #15 scalpel blade. The skin margins were undermined to an appropriate distance in all directions utilizing iris scissors.  The opposing advancement arms were then advanced into place in opposite direction and anchored with interrupted buried subcutaneous sutures.
W Plasty Text: The lesion was extirpated to the level of the fat with a #15 scalpel blade.  Given the location of the defect, shape of the defect and the proximity to free margins a W-plasty was deemed most appropriate for repair.  Using a sterile surgical marker, the appropriate transposition arms of the W-plasty were drawn incorporating the defect and placing the expected incisions within the relaxed skin tension lines where possible.    The area thus outlined was incised deep to adipose tissue with a #15 scalpel blade.  The skin margins were undermined to an appropriate distance in all directions utilizing iris scissors.  The opposing transposition arms were then transposed into place in opposite direction and anchored with interrupted buried subcutaneous sutures.
Z Plasty Text: The lesion was extirpated to the level of the fat with a #15 scalpel blade.  Given the location of the defect, shape of the defect and the proximity to free margins a Z-plasty was deemed most appropriate for repair.  Using a sterile surgical marker, the appropriate transposition arms of the Z-plasty were drawn incorporating the defect and placing the expected incisions within the relaxed skin tension lines where possible.    The area thus outlined was incised deep to adipose tissue with a #15 scalpel blade.  The skin margins were undermined to an appropriate distance in all directions utilizing iris scissors.  The opposing transposition arms were then transposed into place in opposite direction and anchored with interrupted buried subcutaneous sutures.
Zygomaticofacial Flap Text: Given the location of the defect, shape of the defect and the proximity to free margins a zygomaticofacial flap was deemed most appropriate for repair.  Using a sterile surgical marker, the appropriate flap was drawn incorporating the defect and placing the expected incisions within the relaxed skin tension lines where possible. The area thus outlined was incised deep to adipose tissue with a #15 scalpel blade with preservation of a vascular pedicle.  The skin margins were undermined to an appropriate distance in all directions utilizing iris scissors.  The flap was then placed into the defect and anchored with interrupted buried subcutaneous sutures.
Cheek Interpolation Flap Text: A decision was made to reconstruct the defect utilizing an interpolation axial flap and a staged reconstruction.  A telfa template was made of the defect.  This telfa template was then used to outline the Cheek Interpolation flap.  The donor area for the pedicle flap was then injected with anesthesia.  The flap was excised through the skin and subcutaneous tissue down to the layer of the underlying musculature.  The interpolation flap was carefully excised within this deep plane to maintain its blood supply.  The edges of the donor site were undermined.   The donor site was closed in a primary fashion.  The pedicle was then rotated into position and sutured.  Once the tube was sutured into place, adequate blood supply was confirmed with blanching and refill.  The pedicle was then wrapped with xeroform gauze and dressed appropriately with a telfa and gauze bandage to ensure continued blood supply and protect the attached pedicle.
Cheek-To-Nose Interpolation Flap Text: A decision was made to reconstruct the defect utilizing an interpolation axial flap and a staged reconstruction.  A telfa template was made of the defect.  This telfa template was then used to outline the Cheek-To-Nose Interpolation flap.  The donor area for the pedicle flap was then injected with anesthesia.  The flap was excised through the skin and subcutaneous tissue down to the layer of the underlying musculature.  The interpolation flap was carefully excised within this deep plane to maintain its blood supply.  The edges of the donor site were undermined.   The donor site was closed in a primary fashion.  The pedicle was then rotated into position and sutured.  Once the tube was sutured into place, adequate blood supply was confirmed with blanching and refill.  The pedicle was then wrapped with xeroform gauze and dressed appropriately with a telfa and gauze bandage to ensure continued blood supply and protect the attached pedicle.
Interpolation Flap Text: A decision was made to reconstruct the defect utilizing an interpolation axial flap and a staged reconstruction.  A telfa template was made of the defect.  This telfa template was then used to outline the interpolation flap.  The donor area for the pedicle flap was then injected with anesthesia.  The flap was excised through the skin and subcutaneous tissue down to the layer of the underlying musculature.  The interpolation flap was carefully excised within this deep plane to maintain its blood supply.  The edges of the donor site were undermined.   The donor site was closed in a primary fashion.  The pedicle was then rotated into position and sutured.  Once the tube was sutured into place, adequate blood supply was confirmed with blanching and refill.  The pedicle was then wrapped with xeroform gauze and dressed appropriately with a telfa and gauze bandage to ensure continued blood supply and protect the attached pedicle.
Melolabial Interpolation Flap Text: A decision was made to reconstruct the defect utilizing an interpolation axial flap and a staged reconstruction.  A telfa template was made of the defect.  This telfa template was then used to outline the melolabial interpolation flap.  The donor area for the pedicle flap was then injected with anesthesia.  The flap was excised through the skin and subcutaneous tissue down to the layer of the underlying musculature.  The pedicle flap was carefully excised within this deep plane to maintain its blood supply.  The edges of the donor site were undermined.   The donor site was closed in a primary fashion.  The pedicle was then rotated into position and sutured.  Once the tube was sutured into place, adequate blood supply was confirmed with blanching and refill.  The pedicle was then wrapped with xeroform gauze and dressed appropriately with a telfa and gauze bandage to ensure continued blood supply and protect the attached pedicle.
Mastoid Interpolation Flap Text: A decision was made to reconstruct the defect utilizing an interpolation axial flap and a staged reconstruction.  A telfa template was made of the defect.  This telfa template was then used to outline the mastoid interpolation flap.  The donor area for the pedicle flap was then injected with anesthesia.  The flap was excised through the skin and subcutaneous tissue down to the layer of the underlying musculature.  The pedicle flap was carefully excised within this deep plane to maintain its blood supply.  The edges of the donor site were undermined.   The donor site was closed in a primary fashion.  The pedicle was then rotated into position and sutured.  Once the tube was sutured into place, adequate blood supply was confirmed with blanching and refill.  The pedicle was then wrapped with xeroform gauze and dressed appropriately with a telfa and gauze bandage to ensure continued blood supply and protect the attached pedicle.
Posterior Auricular Interpolation Flap Text: A decision was made to reconstruct the defect utilizing an interpolation axial flap and a staged reconstruction.  A telfa template was made of the defect.  This telfa template was then used to outline the posterior auricular interpolation flap.  The donor area for the pedicle flap was then injected with anesthesia.  The flap was excised through the skin and subcutaneous tissue down to the layer of the underlying musculature.  The pedicle flap was carefully excised within this deep plane to maintain its blood supply.  The edges of the donor site were undermined.   The donor site was closed in a primary fashion.  The pedicle was then rotated into position and sutured.  Once the tube was sutured into place, adequate blood supply was confirmed with blanching and refill.  The pedicle was then wrapped with xeroform gauze and dressed appropriately with a telfa and gauze bandage to ensure continued blood supply and protect the attached pedicle.
Paramedian Forehead Flap Text: A decision was made to reconstruct the defect utilizing an interpolation axial flap and a staged reconstruction.  A telfa template was made of the defect.  This telfa template was then used to outline the paramedian forehead pedicle flap.  The donor area for the pedicle flap was then injected with anesthesia.  The flap was excised through the skin and subcutaneous tissue down to the layer of the underlying musculature.  The pedicle flap was carefully excised within this deep plane to maintain its blood supply.  The edges of the donor site were undermined.   The donor site was closed in a primary fashion.  The pedicle was then rotated into position and sutured.  Once the tube was sutured into place, adequate blood supply was confirmed with blanching and refill.  The pedicle was then wrapped with xeroform gauze and dressed appropriately with a telfa and gauze bandage to ensure continued blood supply and protect the attached pedicle.
Abbe Flap (Upper To Lower Lip) Text: The defect of the lower lip was assessed and measured.  Given the location and size of the defect, an Abbe flap was deemed most appropriate.  Using a sterile surgical marker, an appropriate Abbe flap was measured and drawn on the upper lip. Local anesthesia was then infiltrated.  A scalpel was then used to incise the upper lip through and through the skin, vermilion, muscle and mucosa, leaving the flap pedicled on the opposite side.  The flap was then rotated and transferred to the lower lip defect.  The flap was then sutured into place with a three layer technique, closing the orbicularis oris muscle layer with subcutaneous buried sutures, followed by a mucosal layer and an epidermal layer.
Abbe Flap (Lower To Upper Lip) Text: The defect of the upper lip was assessed and measured.  Given the location and size of the defect, an Abbe flap was deemed most appropriate.  Using a sterile surgical marker, an appropriate Abbe flap was measured and drawn on the lower lip. Local anesthesia was then infiltrated. A scalpel was then used to incise the upper lip through and through the skin, vermilion, muscle and mucosa, leaving the flap pedicled on the opposite side.  The flap was then rotated and transferred to the lower lip defect.  The flap was then sutured into place with a three layer technique, closing the orbicularis oris muscle layer with subcutaneous buried sutures, followed by a mucosal layer and an epidermal layer.
Estlander Flap (Upper To Lower Lip) Text: The defect of the lower lip was assessed and measured.  Given the location and size of the defect, an Estlander flap was deemed most appropriate.  Using a sterile surgical marker, an appropriate Estlander flap was measured and drawn on the upper lip. Local anesthesia was then infiltrated. A scalpel was then used to incise the lateral aspect of the flap, through skin, muscle and mucosa, leaving the flap pedicled medially.  The flap was then rotated and positioned to fill the lower lip defect.  The flap was then sutured into place with a three layer technique, closing the orbicularis oris muscle layer with subcutaneous buried sutures, followed by a mucosal layer and an epidermal layer.
Cheiloplasty (Less Than 50%) Text: A decision was made to reconstruct the defect with a  cheiloplasty.  The defect was undermined extensively.  Additional obicularis oris muscle was excised with a 15 blade scalpel.  The defect was converted into a full thickness wedge, of less than 50% of the vertical height of the lip, to facilite a better cosmetic result.  Small vessels were then tied off with 5-0 monocyrl. The obicularis oris, superficial fascia, adipose and dermis were then reapproximated.  After the deeper layers were approximated the epidermis was reapproximated with particular care given to realign the vermilion border.
Cheiloplasty (Complex) Text: A decision was made to reconstruct the defect with a  cheiloplasty.  The defect was undermined extensively.  Additional obicularis oris muscle was excised with a 15 blade scalpel.  The defect was converted into a full thickness wedge to facilite a better cosmetic result.  Small vessels were then tied off with 5-0 monocyrl. The obicularis oris, superficial fascia, adipose and dermis were then reapproximated.  After the deeper layers were approximated the epidermis was reapproximated with particular care given to realign the vermilion border.
Ear Wedge Repair Text: A wedge excision was completed by carrying down an excision through the full thickness of the ear and cartilage with an inward facing Burow's triangle. The wound was then closed in a layered fashion.
Full Thickness Lip Wedge Repair (Flap) Text: Given the location of the defect and the proximity to free margins a full thickness wedge repair was deemed most appropriate.  Using a sterile surgical marker, the appropriate repair was drawn incorporating the defect and placing the expected incisions perpendicular to the vermilion border.  The vermilion border was also meticulously outlined to ensure appropriate reapproximation during the repair.  The area thus outlined was incised through and through with a #15 scalpel blade.  The muscularis and dermis were reaproximated with deep sutures following hemostasis. Care was taken to realign the vermilion border before proceeding with the superficial closure.  Once the vermilion was realigned the superfical and mucosal closure was finished.
Ftsg Text: The defect edges were debeveled with a #15 scalpel blade.  Given the location of the defect, shape of the defect and the proximity to free margins a full thickness skin graft was deemed most appropriate.  Using a sterile surgical marker, the primary defect shape was transferred to the donor site. The area thus outlined was incised deep to adipose tissue with a #15 scalpel blade.  The harvested graft was then trimmed of adipose tissue until only dermis and epidermis was left.  The skin margins of the secondary defect were undermined to an appropriate distance in all directions utilizing iris scissors.  The secondary defect was closed with interrupted buried subcutaneous sutures.  The skin edges were then re-apposed with running  sutures.  The skin graft was then placed in the primary defect and oriented appropriately.
Split-Thickness Skin Graft Text: The defect edges were debeveled with a #15 scalpel blade.  Given the location of the defect, shape of the defect and the proximity to free margins a split thickness skin graft was deemed most appropriate.  Using a sterile surgical marker, the primary defect shape was transferred to the donor site. The split thickness graft was then harvested.  The skin graft was then placed in the primary defect and oriented appropriately.
Burow's Graft Text: The defect edges were debeveled with a #15 scalpel blade.  Given the location of the defect, shape of the defect, the proximity to free margins and the presence of a standing cone deformity a Burow's skin graft was deemed most appropriate. The standing cone was removed and this tissue was then trimmed to the shape of the primary defect. The adipose tissue was also removed until only dermis and epidermis were left.  The skin margins of the secondary defect were undermined to an appropriate distance in all directions utilizing iris scissors.  The secondary defect was closed with interrupted buried subcutaneous sutures.  The skin edges were then re-apposed with running  sutures.  The skin graft was then placed in the primary defect and oriented appropriately.
Cartilage Graft Text: The defect edges were debeveled with a #15 scalpel blade.  Given the location of the defect, shape of the defect, the fact the defect involved a full thickness cartilage defect a cartilage graft was deemed most appropriate.  An appropriate donor site was identified, cleansed, and anesthetized. The cartilage graft was then harvested and transferred to the recipient site, oriented appropriately and then sutured into place.  The secondary defect was then repaired using a primary closure.
Composite Graft Text: The defect edges were debeveled with a #15 scalpel blade.  Given the location of the defect, shape of the defect, the proximity to free margins and the fact the defect was full thickness a composite graft was deemed most appropriate.  The defect was outline and then transferred to the donor site.  A full thickness graft was then excised from the donor site. The graft was then placed in the primary defect, oriented appropriately and then sutured into place.  The secondary defect was then repaired using a primary closure.
Epidermal Autograft Text: The defect edges were debeveled with a #15 scalpel blade.  Given the location of the defect, shape of the defect and the proximity to free margins an epidermal autograft was deemed most appropriate.  Using a sterile surgical marker, the primary defect shape was transferred to the donor site. The epidermal graft was then harvested.  The skin graft was then placed in the primary defect and oriented appropriately.
Dermal Autograft Text: The defect edges were debeveled with a #15 scalpel blade.  Given the location of the defect, shape of the defect and the proximity to free margins a dermal autograft was deemed most appropriate.  Using a sterile surgical marker, the primary defect shape was transferred to the donor site. The area thus outlined was incised deep to adipose tissue with a #15 scalpel blade.  The harvested graft was then trimmed of adipose and epidermal tissue until only dermis was left.  The skin graft was then placed in the primary defect and oriented appropriately.
Skin Substitute Text: The defect edges were debeveled with a #15 scalpel blade.  Given the location of the defect, shape of the defect and the proximity to free margins a skin substitute graft was deemed most appropriate.  The graft material was trimmed to fit the size of the defect. The graft was then placed in the primary defect and oriented appropriately.
Tissue Cultured Epidermal Autograft Text: The defect edges were debeveled with a #15 scalpel blade.  Given the location of the defect, shape of the defect and the proximity to free margins a tissue cultured epidermal autograft was deemed most appropriate.  The graft was then trimmed to fit the size of the defect.  The graft was then placed in the primary defect and oriented appropriately.
Xenograft Text: The defect edges were debeveled with a #15 scalpel blade.  Given the location of the defect, shape of the defect and the proximity to free margins a xenograft was deemed most appropriate.  The graft was then trimmed to fit the size of the defect.  The graft was then placed in the primary defect and oriented appropriately.
Purse String (Simple) Text: Given the location of the defect and the characteristics of the surrounding skin a purse string closure was deemed most appropriate.  Undermining was performed circumfirentially around the surgical defect.  A purse string suture was then placed and tightened.
Purse String (Intermediate) Text: Given the location of the defect and the characteristics of the surrounding skin a purse string intermediate closure was deemed most appropriate.  Undermining was performed circumfirentially around the surgical defect.  A purse string suture was then placed and tightened.
Partial Purse String (Simple) Text: Given the location of the defect and the characteristics of the surrounding skin a simple purse string closure was deemed most appropriate.  Undermining was performed circumfirentially around the surgical defect.  A purse string suture was then placed and tightened. Wound tension only allowed a partial closure of the circular defect.
Partial Purse String (Intermediate) Text: Given the location of the defect and the characteristics of the surrounding skin an intermediate purse string closure was deemed most appropriate.  Undermining was performed circumfirentially around the surgical defect.  A purse string suture was then placed and tightened. Wound tension only allowed a partial closure of the circular defect.
Localized Dermabrasion With Wire Brush Text: The patient was draped in routine manner.  Localized dermabrasion using 3 x 17 mm wire brush was performed in routine manner to papillary dermis. This spot dermabrasion is being performed to complete skin cancer reconstruction. It also will eliminate the other sun damaged precancerous cells that are known to be part of the regional effect of a lifetime's worth of sun exposure. This localized dermabrasion is therapeutic and should not be considered cosmetic in any regard.
Tarsorrhaphy Text: A tarsorrhaphy was performed using Frost sutures.
Complex Repair And Flap Additional Text (Will Appearing After The Standard Complex Repair Text): The complex repair was not sufficient to completely close the primary defect. The remaining additional defect was repaired with the flap mentioned below.
Complex Repair And Graft Additional Text (Will Appearing After The Standard Complex Repair Text): The complex repair was not sufficient to completely close the primary defect. The remaining additional defect was repaired with the graft mentioned below.
Unique Flap 1 Name: Myocutaneous Island pedicle Flap
Unique Flap 2 Name: Peng Flap
Unique Flap 3 Name: Mercedes Flap
Unique Flap 4 Name: Banner Flap
Unique Flap 5 Name: tunneled myocutaneous flap
Unique Flap 6 Name: Claudia-B?ch flap
Unique Flap 7 Name: Mustarde flap
Unique Flap 8 Name: East to West Flap
Unique Flap 1 Text: A decision was made to reconstruct the defect utilizing a myocutaneous Island pedicle Flap based on the levator labii superioris muscle.  A telfa template was made of the defect.  This telfa template was then used to outline the myocutaneous flap, based along the meilolabial fold.  The donor area for the pedicle flap was then injected with anesthesia.  The flap was excised through the skin and subcutaneous tissue down to the layer of the underlying musculature.  The myocutaneous flap was carefully excised within this deep plane to maintain its blood supply. Based on the muscle. The edges of the donor site were undermined.   The donor site was closed in a primary fashion to the point of transposition.  The pedicle was then transposed into position and sutured.  Once the flap was sutured into place, adequate blood supply was confirmed with blanching and refill.
Unique Flap 2 Text: A decision was made to reconstruct the defect utilizing a Peng Flap (Bilateral Advancement Rotation Flap). Given the location of the defect and the proximity to free margins, this flap was deemed most appropriate.  Using a sterile surgical marker, the appropriate rotation flaps were drawn incorporating the defect and placing the expected incisions within the relaxed skin tension lines where possible.    The area thus outlined was incised deep to adipose tissue with a #15 scalpel blade.  The skin margins were undermined to an appropriate distance in all directions utilizing iris scissors.
Unique Flap 3 Text: The defect edges were debeveled with a #15 scalpel blade.  Given the location of the defect, shape of the defect and the proximity to free margins a Mercedes (double advancement flap) was deemed most appropriate.  Using a sterile surgical marker, the appropriate transposition flaps were drawn incorporating the defect and placing the expected incisions within the relaxed skin tension lines where possible.    The area thus outlined was incised deep to adipose tissue with a #15 scalpel blade.  The skin margins were undermined to an appropriate distance in all directions utilizing iris scissors.  Hemostasis was achieved with electrocautery.  The flaps were then advanced into the defect and anchored with interrupted buried subcutaneous sutures.
Unique Flap 4 Text: The defect edges were debeveled with a #15 scalpel blade.  Given the location of the defect and the proximity to free margins a Banner transposition flap was deemed most appropriate.  Using a sterile surgical marker, an appropriate Banner transposition flap was drawn incorporating the defect.    The area thus outlined was incised deep to adipose tissue with a #15 scalpel blade.  The skin margins were undermined to an appropriate distance in all directions utilizing iris scissors.
Unique Flap 5 Text: A decision was made to reconstruct the defect utilizing a tunneled myocutaneous Island pedicle Flap based on the anterior auricularis muscle.  A telfa template was made of the defect.  This telfa template was then used to outline the myocutaneous flap, based along the preauricular fold.  The donor area for the pedicle flap was then injected with anesthesia.  The flap was excised through the skin and subcutaneous tissue down to the layer of the underlying musculature.  The myocutaneous flap was carefully excised within this deep plane to maintain its blood supply based on the muscle. The edges of the donor site were undermined.   The donor site was closed in a primary fashion to the point of transposition.  The pedicle was then transposed through a tunnel into position and sutured.  Once the flap was sutured into place, adequate blood supply was confirmed with blanching and refill.
Unique Flap 6 Text: A decision was made to reconstruct the defect utilizing an Anti-aging-B?ch Flap (Bilateral helical Advancement Rotation Flap). Given the location of the defect and the proximity to free margins, this flap was deemed most appropriate.  Using a sterile surgical marker, the appropriate flaps were drawn incorporating the defect and placing the expected incisions within the relaxed skin tension lines where possible.  The area thus outlined was incised deep to adipose tissue with a #15 scalpel blade.  The skin margins were undermined to an appropriate distance in all directions utilizing iris scissors. Cartilage was incorporated into the flap arms to maintain helical anatomy.
Unique Flap 7 Text: A decision was made to reconstruct the defect utilizing a Mustarde Flap (Advancement Rotation Flap). Given the location of the defect and the proximity to free margins, this flap was deemed most appropriate.  Using a sterile surgical marker, the appropriate rotation flap was drawn incorporating the defect and placing the expected incisions within the relaxed skin tension lines where possible.    The area thus outlined was incised deep to adipose tissue with a #15 scalpel blade.  The skin margins were undermined to an appropriate distance in all directions utilizing iris scissors. The flap was advanced and rotated under the eyelid with a sling created laterally to keep ectropion minimal.
Unique Flap 8 Text: A decision was made to reconstruct the defect utilizing an East to West Flap (Modified Burows Advancement Flap). Given the location of the defect and the proximity to free margins, this flap was deemed most appropriate.  Using a sterile surgical marker, the appropriate advancement flaps were drawn incorporating the defect and placing the expected incisions within the relaxed skin tension lines where possible.    The area thus outlined was incised deep to adipose tissue with a #15 scalpel blade.  The skin margins were undermined to an appropriate distance in all directions utilizing iris scissors. Minimal alar distortion was created with flap approximation.
Manual Repair Warning Statement: We plan on removing the manually selected variable below in favor of our much easier automatic structured text blocks found in the previous tab. We decided to do this to help make the flow better and give you the full power of structured data. Manual selection is never going to be ideal in our platform and I would encourage you to avoid using manual selection from this point on, especially since I will be sunsetting this feature. It is important that you do one of two things with the customized text below. First, you can save all of the text in a word file so you can have it for future reference. Second, transfer the text to the appropriate area in the Library tab. Lastly, if there is a flap or graft type which we do not have you need to let us know right away so I can add it in before the variable is hidden. No need to panic, we plan to give you roughly 6 months to make the change.
Same Histology In Subsequent Stages Text: The pattern and morphology of the tumor is as described in the first stage.
No Residual Tumor Seen Histology Text: There were no malignant cells seen in the sections examined.
Inflammation Suggestive Of Cancer Camouflage Histology Text: There was a dense lymphocytic infiltrate which prevented adequate histologic evaluation of adjacent structures.
Bcc Histology Text: There were numerous aggregates of basaloid cells.
Bcc Infiltrative Histology Text: There were numerous aggregates of basaloid cells demonstrating an infiltrative pattern.
Mart-1 - Positive Histology Text: MART-1 staining demonstrates areas of higher density and clustering of melanocytes with Pagetoid spread upwards within the epidermis. The surgical margins are positive for tumor cells.
Mart-1 - Negative Histology Text: MART-1 staining demonstrates a normal density and pattern of melanocytes along the dermal-epidermal junction. The surgical margins are negative for tumor cells.
Information: Selecting Yes will display possible errors in your note based on the variables you have selected. This validation is only offered as a suggestion for you. PLEASE NOTE THAT THE VALIDATION TEXT WILL BE REMOVED WHEN YOU FINALIZE YOUR NOTE. IF YOU WANT TO FAX A PRELIMINARY NOTE YOU WILL NEED TO TOGGLE THIS TO 'NO' IF YOU DO NOT WANT IT IN YOUR FAXED NOTE.

## 2022-06-21 NOTE — PROCEDURE: MIPS QUALITY
Quality 137: Melanoma: Continuity Of Care - Recall System: Patient information entered into a recall system that includes: target date for the next exam specified AND a process to follow up with patients regarding missed or unscheduled appointments
Quality 111:Pneumonia Vaccination Status For Older Adults: Pneumococcal Vaccination Previously Received
Detail Level: Detailed
Quality 138: Melanoma: Coordination Of Care: A treatment plan was communicated to the physicians providing continuing care within one month of diagnosis outlining: diagnosis, tumor thickness and a plan for surgery or alternate care.
Quality 130: Documentation Of Current Medications In The Medical Record: Current Medications Documented
Quality 226: Preventive Care And Screening: Tobacco Use: Screening And Cessation Intervention: Patient screened for tobacco use and is an ex/non-smoker

## 2022-06-23 ENCOUNTER — OUTPATIENT INFUSION SERVICES (OUTPATIENT)
Dept: ONCOLOGY | Facility: MEDICAL CENTER | Age: 87
End: 2022-06-23
Attending: INTERNAL MEDICINE
Payer: MEDICARE

## 2022-06-23 VITALS
OXYGEN SATURATION: 98 % | DIASTOLIC BLOOD PRESSURE: 64 MMHG | HEIGHT: 64 IN | SYSTOLIC BLOOD PRESSURE: 114 MMHG | TEMPERATURE: 97.2 F | WEIGHT: 159.83 LBS | RESPIRATION RATE: 18 BRPM | HEART RATE: 74 BPM | BODY MASS INDEX: 27.29 KG/M2

## 2022-06-23 DIAGNOSIS — M81.0 OSTEOPOROSIS, UNSPECIFIED OSTEOPOROSIS TYPE, UNSPECIFIED PATHOLOGICAL FRACTURE PRESENCE: ICD-10-CM

## 2022-06-23 LAB
CA-I BLD ISE-SCNC: 1.14 MMOL/L (ref 1.1–1.3)
CREAT BLD-MCNC: 0.8 MG/DL (ref 0.5–1.4)

## 2022-06-23 PROCEDURE — 700111 HCHG RX REV CODE 636 W/ 250 OVERRIDE (IP): Mod: JG | Performed by: INTERNAL MEDICINE

## 2022-06-23 PROCEDURE — 82565 ASSAY OF CREATININE: CPT

## 2022-06-23 PROCEDURE — 96372 THER/PROPH/DIAG INJ SC/IM: CPT

## 2022-06-23 PROCEDURE — 82330 ASSAY OF CALCIUM: CPT

## 2022-06-23 PROCEDURE — 36415 COLL VENOUS BLD VENIPUNCTURE: CPT

## 2022-06-23 RX ADMIN — DENOSUMAB 60 MG: 60 INJECTION SUBCUTANEOUS at 11:16

## 2022-06-23 ASSESSMENT — PAIN DESCRIPTION - PAIN TYPE: TYPE: CHRONIC PAIN

## 2022-06-23 ASSESSMENT — FIBROSIS 4 INDEX: FIB4 SCORE: 1.54

## 2022-06-23 NOTE — PROGRESS NOTES
Pt ambulatory to OPI for [id number] Prolia injection.  Pt has no s/s of infection, pt denies any recent dental surgeries or plans there of w/n the next 4 weeks, pt has no complaints at this time.  ISTAT labs drawn from RAC w/ 25G butterfly, gauze and coban applied to draw site.  Pt cr and ca w/n parameters for tx.  Prolia injected into pt's right back arm, band-aid placed.  Pt left on foot in NAD.  Pt's next appt for in 6 months made.

## 2022-06-24 ENCOUNTER — TELEPHONE (OUTPATIENT)
Dept: PHYSICAL MEDICINE AND REHAB | Facility: MEDICAL CENTER | Age: 87
End: 2022-06-24
Payer: MEDICARE

## 2022-06-24 NOTE — TELEPHONE ENCOUNTER
Called pt to r/s post SP visit with Dr. Lee due to provider emergency. Appt on 6/27 has been canceled. Notified we will call her back to reschedule asap.

## 2022-06-27 ENCOUNTER — OFFICE VISIT (OUTPATIENT)
Dept: RHEUMATOLOGY | Facility: MEDICAL CENTER | Age: 87
End: 2022-06-27
Payer: MEDICARE

## 2022-06-27 ENCOUNTER — APPOINTMENT (OUTPATIENT)
Dept: PHYSICAL MEDICINE AND REHAB | Facility: MEDICAL CENTER | Age: 87
End: 2022-06-27
Payer: MEDICARE

## 2022-06-27 VITALS
WEIGHT: 156 LBS | SYSTOLIC BLOOD PRESSURE: 150 MMHG | TEMPERATURE: 97.4 F | DIASTOLIC BLOOD PRESSURE: 70 MMHG | OXYGEN SATURATION: 98 % | BODY MASS INDEX: 26.96 KG/M2 | HEART RATE: 94 BPM | RESPIRATION RATE: 14 BRPM

## 2022-06-27 DIAGNOSIS — M15.9 PRIMARY OSTEOARTHRITIS INVOLVING MULTIPLE JOINTS: ICD-10-CM

## 2022-06-27 DIAGNOSIS — G62.9 NEUROPATHY: ICD-10-CM

## 2022-06-27 DIAGNOSIS — H35.30 MACULAR DEGENERATION OF BOTH EYES, UNSPECIFIED TYPE: ICD-10-CM

## 2022-06-27 DIAGNOSIS — D68.59 PROTEIN S DEFICIENCY (HCC): ICD-10-CM

## 2022-06-27 DIAGNOSIS — C43.9 MALIGNANT MELANOMA, UNSPECIFIED SITE (HCC): ICD-10-CM

## 2022-06-27 DIAGNOSIS — Z79.01 CHRONIC ANTICOAGULATION: ICD-10-CM

## 2022-06-27 DIAGNOSIS — M81.0 OSTEOPOROSIS WITHOUT CURRENT PATHOLOGICAL FRACTURE, UNSPECIFIED OSTEOPOROSIS TYPE: ICD-10-CM

## 2022-06-27 DIAGNOSIS — I10 ESSENTIAL HYPERTENSION, BENIGN: ICD-10-CM

## 2022-06-27 DIAGNOSIS — I35.0 AORTIC VALVE STENOSIS, ETIOLOGY OF CARDIAC VALVE DISEASE UNSPECIFIED: ICD-10-CM

## 2022-06-27 PROCEDURE — 99214 OFFICE O/P EST MOD 30 MIN: CPT | Performed by: INTERNAL MEDICINE

## 2022-06-27 ASSESSMENT — FIBROSIS 4 INDEX: FIB4 SCORE: 1.54

## 2022-06-27 NOTE — PROGRESS NOTES
Chief Complaint- joint pain     Subjective:   Marry Mathur is a 86 y.o. female here today for follow up of rheumatological issues    This is a follow-up visit for this patient who we see in this clinic for osteoarthritis.  Patient has a remote diagnosis of rheumatoid arthritis in the past with negative serologies and negative x-rays.  Patient is currently on Plaquenil at 200 mg p.o. twice daily as patient is unable to take NSAIDs as she is on chronic anticoagulation.  Patient denies any side effects from the medication, denies any unexplained weight loss, denies any fevers of unknown etiology, denies any GI upset, denies any rashes, denies any new joint swelling, denies recurrent infections.     Complicating factor is that patient's just been diagnosed with metastatic melanoma with positive lymph nodes from the left axilla.  Patient currently undergoing evaluation for immunotherapy treatment.     Patient's issue today is exacerbation of neuropathy bilateral lower extremities with left foot slightly worse than the right, this is an old problem secondary to a diagnosis of spinal stenosis.  Patient is intermittently followed by spine clinic Dr. Cooper at [facility]     Patient also has low back issues scheduled for facet joint injections through Dr. Cooper at [facility]     Other issues include the development of leg length discrepancy status post left CARLYLE with left leg longer than the right.  Patient just recently got shoes to help adjust her leg length.      Other issues include a finding of osteoporosis on patient's bone density scan from March 2018, patient was not able to tolerate Fosamax because of a history of Araya's esophagus, patient currently on Prolia 60 mg subcu every 6 months with benefit.        Additional comorbidities include diabetes for which patient  takes metformin, also with a diagnosis of spinal stenosis with intermittent weakness in her legs  with progressive numbness and weakness in the left leg.  Patient also with protein S deficiency with a history of DVT on chronic anticoagulation.      Patient also with moderate aortic stenosis followed by cardiology     S/p NSAIDS-unable to take because of chronic anticoagulation     Left TKA   Left CARLYLE      S/p Remicade-stopped because of hx of melanoma about 1996 and has multiple other skin cancers  S/p Orencia-lost efficacy  S/p Humira-stopped because of recurrence of melanoma October 2017  S/p MTX-stopped because of development of skin cancer/melanoma October 2017  S/p NSAIDS-relatively contraindicated as patient is on chronic anticoagulation   S/p xeljanz-stopped 2018 because of the development of colonic adenocarcinoma  S/p fosamax-unable to tolerate-GI upset      G6PD 13.6 adequate 5/2019  DEXA 3/18/2016 T scores 1.1, -1.1   FRAX 3/18/2016 major osteoporotic fracture risk 14.3%, hip fracture risks 3.7%  DEXA 3/23/2018 T scores 0.2, -1.4  FRAX 3/23/2018 major osteoporotic fracture risk 19.3%, hip fracture risk 6.1%  DEXA 6/2/2020 T scores 0.7, -1.4  FRAX 6/2/2020 major osteoporotic fracture risk 17.2%, hip fracture risk 4.9%    Addendum 8/15/2022  DEXA 8/11/2022 T score 1.2 -1.1  FRAX 8/11/2022  major osteoporotic fracture risk 15.4%, hip fracture risk 4.2%    Prolia started 6/2019    Echocardiogram 7/2012 normal RUST  Echocardiogram 4/2021-CONCLUSIONS  Compared to the images of the study done 04- there has been no   significant change.  Left ventricular ejection fraction is visually estimated to be 65%.  Moderate aortic stenosis.  Unable to estimate pulmonary artery pressure due to an inadequate   tricuspid regurgitant jet.  Echocardiogram 5/2022-CONCLUSIONS  The left ventricular ejection fraction is visually estimated to be 60-  65%.  Indeterminate diastolic function due to significant mitral   calcification.  Normal right ventricular size and systolic function.  Estimated  right ventricular systolic pressure is 35 mmHg.  Mildly dilated left atrium.  Moderate mitral annular calcification without significant mitral   stenosis.  Moderate to severe aortic valve stenosis by Doppler assessment,   although visually the aortic valve appears at most moderately stenotic   visually.  Transvalvular gradients are - Peak: 59 mmHg, Mean:  33 mmHg.  Vmax is 3.8 m/s. DVI is 0.22.  Normal inferior vena cava size and inspiratory collapse.      RF neg 3/2014 [facility]; RF neg 6/2014 [facility]; RF neg 6/2016  CCP neg 3/2014 [facility]; CCP neg 6/2014 [facility]; CCP neg 6/2016  Uric acid 4.7 3/2014 [facility];Uric acid 4.5 6/2016  Hep B neg 6/2016  Quantiferon Gold neg 6/2016     Hand x-rays 3/2016-indicate osteoarthritis     Feet x-rays 3/2016-indicate osteoarthritis         Corticosteroid Therapy Informed Consent signed 1/17/2019-copy given to patient          Current Outpatient Medications   Medication Sig Dispense Refill    atorvastatin (LIPITOR) 10 MG Tab Take 1 Tablet by mouth every evening. 90 Tablet 1    gabapentin (NEURONTIN) 100 MG Cap Take 4 Capsules by mouth at bedtime. 120 Capsule 2    Multiple Vitamins-Minerals (PRESERVISION AREDS 2 PO) every day.      Calcium Carbonate (CALCIUM 600 PO) Take 300 mg by mouth.      SODIUM FLUORIDE 5000 PLUS 1.1 % Cream BRUSH WITH PEA SIZED AMOUNT EVERY DAY PREFERABLY BEFORE BEDTIME. SPIT OUT ALL EXCESS. DO NOT RINSE      hydroxychloroquine (PLAQUENIL) 200 MG Tab 11/2 tablet p.o. daily (Patient taking differently: Take 200 mg by mouth every day.) 135 Tablet 0    omeprazole (PRILOSEC) 20 MG delayed-release capsule Take 1 Capsule by mouth every day. 90 Capsule 3    lidocaine (LIDODERM) 5 % Patch APPLY ONE PATCH TO CLEAN DRY SKIN AS DIRECTED DAILY 90 Patch 1    denosumab (PROLIA) 60 MG/ML Solution Prefilled Syringe injection Inject 60 mg under the skin every 6 months.      Polyethylene Glycol 3350 (MIRALAX PO) Take  by mouth every day at 6 PM. 1 teaspoon daily      rivaroxaban  (XARELTO) 20 MG Tab tablet Take 1 Tablet by mouth with dinner. 90 Tablet 1    diphenhydrAMINE-APAP, sleep,  MG Tab Take 2 Tablets by mouth at bedtime.      acetaminophen (TYLENOL) 500 MG Tab Take 500 mg by mouth 2 times a day.      vitamin D (CHOLECALCIFEROL) 1000 UNIT Tab Take 1,000 Units by mouth every morning.      Magnesium 400 MG Cap every day. (Patient not taking: Reported on 6/27/2022)      B Complex Vitamins (B COMPLEX B-12 PO) Take  by mouth. (Patient not taking: Reported on 6/27/2022)      Potassium 99 MG Tab Take 99 mg by mouth every day. (Patient not taking: Reported on 6/27/2022)       No current facility-administered medications for this visit.     She  has a past medical history of Adenocarcinoma of colon ([facility]) (10/30/2018), Anesthesia, Atrial fibrillation [I48.91] (04/19/2016), Back pain (06/01/2022), Blood clotting disorder ([facility]) (2012), Bowel habit changes, Cancer ([facility]), Cataract, Chronic back pain greater than 3 months duration, Deep vein thrombosis ([facility]) (03/08/2016), Essential hypertension, benign (06/27/2012), GERD (gastroesophageal reflux disease) (06/27/2012), Heart murmur, Hyperlipidemia, Hypertension, Impaired fasting glucose (11/02/2017), Melanoma ([facility]), Mild aortic stenosis, Other and unspecified hyperlipidemia (06/27/2012), Prediabetes, Protein S deficiency ([facility]) (11/02/2017), Rheumatoid arthritis involving multiple sites with positive rheumatoid factor ([facility]) (03/14/2016), Rheumatoid nodule ([facility]) (07/25/2017), Right bundle branch block (06/27/2012), and Urinary incontinence (11/02/2017).    ROS   Other than what is mentioned in HPI or physical exam, there is no history of headaches, double vision or blurred vision. No temporal tenderness or jaw claudication. No trouble swallowing difficulties or sore throats.  No chest complaints including chest pain, cough or sputum production. No GI complaints including nausea, vomiting, change in bowel habits, or past peptic ulcer disease. No  history of blood in the stools. No urinary complaints including dysuria or frequency. No history of alopecia, photosensitivity, oral ulcerations, Raynaud's phenomena.       Objective:     BP (!) 150/70   Pulse 94   Temp 36.3 °C (97.4 °F) (Temporal)   Resp 14   Wt 70.8 kg (156 lb)   SpO2 98%  Body mass index is 26.96 kg/m².   Physical Exam:    Constitutional: Alert and oriented X3, patient is talkative with good eye contact.Skin: Warm, dry, good turgor, no rashes in visible areas.Eye: Equal, round and reactive, conjunctiva clear, lids normal EOM intactENMT: Lips without lesions, good dentition, no oropharyngeal ulcers, moist buccal mucosa, pinna without deformityNeck: Trachea midline, no masses, no thyromegaly.Lymph:  No cervical lymphadenopathy, no axillary lymphadenopathy, no supraclavicular lymphadenopathyRespiratory: Unlabored respiratory effort, lungs clear to auscultation, no wheezes, no ronchi.Cardiovascular: Normal S1, S2, .Abdomen: Soft, non-tender, no masses, no hepatosplenomegaly.Psych: Alert and oriented x3, normal affect and mood.Neuro: Cranial nerves 2-12 are grossly intact, no loss of sensation LEExt:no joint laxity noted in bilateral arms, no joint laxity noted in bilateral legs, mild dowagers hump upper back, Heberden's nodes on DIP joints bilateral hands but no swan-neck or boutonniere deformities no dactylitis or sausage digits, toes without crossover toes without splay toes, poor muscular architecture of the shoulders but there is full range of motion mild squaring of the thumb CMC joints bilaterally    Lab Results   Component Value Date/Time    QNTTBGOLD Negative 06/29/2016 02:03 PM     Lab Results   Component Value Date/Time    HEPBCORIGM Negative 06/29/2016 02:03 PM    HEPBSAG Negative 06/29/2016 02:03 PM     Lab Results   Component Value Date/Time    SODIUM 133 (L) 06/01/2022 11:55 AM    POTASSIUM 4.2 06/01/2022 11:55 AM    CHLORIDE 97 06/01/2022 11:55 AM    CO2 24 06/01/2022 11:55 AM     GLUCOSE 98 06/01/2022 11:55 AM    BUN 26 (H) 06/01/2022 11:55 AM    CREATININE 0.77 06/01/2022 11:55 AM      Lab Results   Component Value Date/Time    WBC 9.1 06/01/2022 11:55 AM    RBC 4.32 06/01/2022 11:55 AM    HEMOGLOBIN 13.6 06/01/2022 11:55 AM    HEMATOCRIT 40.8 06/01/2022 11:55 AM    MCV 94.4 06/01/2022 11:55 AM    MCH 31.5 06/01/2022 11:55 AM    MCHC 33.3 (L) 06/01/2022 11:55 AM    MPV 11.0 06/01/2022 11:55 AM    NEUTSPOLYS 78.10 (H) 06/01/2022 11:55 AM    LYMPHOCYTES 11.30 (L) 06/01/2022 11:55 AM    MONOCYTES 8.70 06/01/2022 11:55 AM    EOSINOPHILS 1.20 06/01/2022 11:55 AM    BASOPHILS 0.30 06/01/2022 11:55 AM      Lab Results   Component Value Date/Time    CALCIUM 8.9 06/01/2022 11:55 AM    ASTSGOT 17 06/01/2022 11:55 AM    ALTSGPT 21 06/01/2022 11:55 AM    ALKPHOSPHAT 47 06/01/2022 11:55 AM    TBILIRUBIN 0.6 06/01/2022 11:55 AM    ALBUMIN 4.4 06/01/2022 11:55 AM    TOTPROTEIN 6.9 06/01/2022 11:55 AM     Lab Results   Component Value Date/Time    URICACID 4.5 06/29/2016 02:03 PM    RHEUMFACTN <10 06/29/2016 02:03 PM    CCPANTIBODY 4 06/29/2016 02:03 PM     Lab Results   Component Value Date/Time    SEDRATEWES 11 07/07/2018 11:25 AM     Lab Results   Component Value Date/Time    HBA1C 5.5 05/16/2016 07:50 AM     Lab Results   Component Value Date/Time    G6PD 13.6 05/20/2019 10:01 AM     Lab Results   Component Value Date/Time    CPKTOTAL 61 06/29/2016 02:03 PM     Assessment and Plan:     1. Primary osteoarthritis involving multiple joints  Unable to take NSAIDs because of chronic anticoagulation for protein S deficiency, patient had been on Plaquenil at 200 mg p.o. twice daily but we will stop that so as to not interfere with patient's immunotherapy for treatment of metastatic melanoma.  Tylenol or local corticosteroid injections only at this time.  - DS-BONE DENSITY STUDY (DEXA); Future    2. Osteoporosis without current pathological fracture, unspecified osteoporosis type  Last DEXA June 2020 patient  due for DEXA DEXA ordered for patient  Currently on Prolia 60 mg subcu every 6 months  - DS-BONE DENSITY STUDY (DEXA); Future    3. Chronic anticoagulation  This will impact with kind of medications we can use for this patient's arthritis, NSAIDs are contraindicated because of increased risk of bleeding while on chronic anticoagulation  - DS-BONE DENSITY STUDY (DEXA); Future    4. Protein S deficiency (HCC)  Currently on chronic anticoagulation  - DS-BONE DENSITY STUDY (DEXA); Future    5. Essential hypertension, benign  May impact the type of medications we can use for this patient's arthritis. We will have to keep this under advisement.  - DS-BONE DENSITY STUDY (DEXA); Future    6. Macular degeneration of both eyes, unspecified type  Followed by Dr. Savage in ophthalmology  - DS-BONE DENSITY STUDY (DEXA); Future    7. Malignant melanoma, unspecified site (HCC)  With positive lymph nodes, currently followed by dermatology and scheduled to see oncology in 2 days to start treatment immunotherapy    8. Neuropathy  Of bilateral lower extremities left worse than right secondary to spinal stenosis, currently on gabapentin and is scheduled for epidurals through spine clinic    9. Aortic valve stenosis, etiology of cardiac valve disease unspecified  Seen on echocardiogram May 2022, has an appoint with cardiology tomorrow    Followup: Return in about 6 months (around 12/27/2022). or sooner jose eduardo Mathur  was seen 30 minutes face-to-face of which more than 50% of the time was spent counseling the patient (excluding time for procedures)  regarding  rheumatological condition and care. Therapy was discussed in detail.      Please note that this dictation was created using voice recognition software. I have made every reasonable attempt to correct obvious errors, but I expect that there are errors of grammar and possibly content that I did not discover before finalizing the note.

## 2022-06-28 ENCOUNTER — APPOINTMENT (RX ONLY)
Dept: URBAN - METROPOLITAN AREA CLINIC 36 | Facility: CLINIC | Age: 87
Setting detail: DERMATOLOGY
End: 2022-06-28

## 2022-06-28 DIAGNOSIS — Z48.02 ENCOUNTER FOR REMOVAL OF SUTURES: ICD-10-CM

## 2022-06-28 PROCEDURE — ? SUTURE REMOVAL (GLOBAL PERIOD)

## 2022-06-28 ASSESSMENT — LOCATION DETAILED DESCRIPTION DERM: LOCATION DETAILED: NASAL ROOT

## 2022-06-28 ASSESSMENT — LOCATION ZONE DERM: LOCATION ZONE: NOSE

## 2022-06-28 ASSESSMENT — LOCATION SIMPLE DESCRIPTION DERM: LOCATION SIMPLE: NOSE

## 2022-06-28 NOTE — PROCEDURE: SUTURE REMOVAL (GLOBAL PERIOD)
Detail Level: Detailed
Add [id number] Cpt? (Important Note: In 2017 The Use Of 26653 Is Being Tracked By Cms To Determine Future Global Period Reimbursement For Global Periods): no

## 2022-06-28 NOTE — H&P (VIEW-ONLY)
Follow-up patient Note    Interventional Pain and Spine  Physiatry (Physical Medicine and Rehabilitation)     Patient Name: Marry Mathur  : 1935  Date of Service: 2022      Chief Complaint:   Chief Complaint   Patient presents with   • Follow-Up     Right leg pain       HISTORY 3/17/22  Marry Mathur is a 86 y.o. female who presents today with constant low back pain and burning lateral right leg pain that started a couple of years ago with no known inciting event. States she has seen Dr. Cooper for multiple years, most recently 4 months ago, but feels that her pain continues to worsen so she seeks to reestablish care with a new pain provider.     Her pain at its best-worse level during the course of the day is 4-9/10, respectively. Pain right now is 7/10 on the numeric pain scale. Pain worsens with standing and walking and improves slightly with rest and sitting. Her pain interferes somewhat with ADLs. Pain interferes with her ability to work in her yard.  The patient denies new weakness, numbness, or bladder/bowel incontinence. Denies pain in her left leg. Endorses history of neuropathy in feet with no known inciting event. Had an EMG done with Dr. Booth which revealed a left peroneal axonal mononeuropathy per chart. Has hx of RA, feels that her RA symptoms are stable.     Notes that she is able to ambulate more easily by bending forward and using a shopping cart. Notes that propping her legs in a recliner improves her pain.     On xarelto. Seeing Dr. Rodney. Pt states she has been able to stop xarelto for procedures in the past.     The patient is currently going to physical therapy for this problem, most recently last week. Endorses improvement with dry needling lasting 12-24h.     Patient has tried the following medications with varied success (current meds in bold):   Gabapentin 300mg QHS  Tylenol 500mg BID     Also taking fish oil     Therapeutic modalities and  interventional therapies to date include:  -multiple per chart. Pt states the most helpful were bilateral 2-level facet joint injections with helped for a few months  - Lumbar epidural  - pt notes she has had multiple other injections that are not viewable in her [facility] chart     Medical history includes osteoporosis, protein S deficiency, left TKA, left CARLYLE, RA, HLD, HTN, GERD, DVT     Denies hx of back surgery.    Notes from outside provider [facility] reviewed. Indicating:  - L3-4 ILESI on 20  - right L3-4 ILESI on 10/29/2020 - 30% pain relief  - bilateral L4-5 and L5-S1 facet joint injections 12/15/2020 - 50-70% pain relief  - right L3-4 ILESI on 21 - 50% pain relief  - right L2-L4 MBB on 21 with significant pain relief, right L2-L4 MBB on 9/3/21 with significant pain relief, right L2-L4 RFA 10/15/21  One note mentions resolution of leg pain after procedure targeting the facet    HPI  Today's visit   Marry Mathur ( 1935) is a female with The primary encounter diagnosis was Sacroiliac joint dysfunction of right side. Diagnoses of Lumbar spondylosis, Chronic bilateral low back pain with right-sided sciatica, Spinal stenosis of lumbar region, unspecified whether neurogenic claudication present, Rheumatoid arthritis involving multiple sites with positive rheumatoid factor (HCC), Neuropathy, Numbness and tingling of right leg, and Lumbar radiculitis were also pertinent to this visit.    S/p 22 right L4-L5 and L5-S1 transforaminal epidural steroid injection with initial 25-30% improvement in pain for 1.5 weeks, then worsening pain. Saw PT. Has had new leg cramps which interfere with sleep. Having new burning pain at medial right thigh. PT thinks she could have component of SIJ pain.    Taking gabapentin 400mg QHS. Have trouble falling asleep and is unsure why.    Seeing Dr. Easley from oncology and planning to also get another evaluation with [facility]  "next week. Currently awaiting immunotherapy treatment for metastatic melanoma.    Pain continues to limit her ability to perform ADLs and garden.    Ongoing difficulty with prolonged standing. Notes she is able to walk for about 20-30 minutes before needing to take a break.  Uses a shopping cart helps.    She continues to take gabapentin 400 milligrams nightly and Tylenol as needed.  Denies side effects.  Endorses ongoing pain limited weakness.  Pt denies new numbness, tingling, or weakness.    Medical records review: op report 6/7/22 indicating melanoma excision. Currently awaiting immunotherapy treatment. Also reviewed rheum note 6/27/22    Procedure history:  - 5/10/22 right Lumbar L4-5 interlaminar epidural steroid injection under fluoroscopic guidance - 10-15% pain relief x 2-3 days  - 5/31/22 right L4-L5 and L5-S1 transforaminal epidural steroid injection - initial 25-30% improvement in pain for 1.5 weeks, then worsening pain.        ROS:   Red Flags ROS:   Fever, Chills, Sweats: Denies  Involuntary Weight Loss: Denies  Bladder Incontinence: Denies  Bowel Incontinence: denies  Saddle Anesthesia: Denies    All other systems reviewed and negative.     PMHx:   Past Medical History:   Diagnosis Date   • Adenocarcinoma of colon (HCC) 10/30/2018    From sessile serrated polyp 10/2018   • Anesthesia     pt states \"one new dr scraped my esophagus when they put the tube in and I started bleeding so they couldn't operate\"    • Atrial fibrillation [I48.91] 04/19/2016     pt denies    • Back pain 06/01/2022    0/10   • Blood clotting disorder (HCC) 2012    clot in leg   • Bowel habit changes     constipation    • Cancer (HCC)     skin, colon 2018   • Cataract     belia IOL    • Chronic back pain greater than 3 months duration    • Deep vein thrombosis (HCC) 03/08/2016    First occurrence in [facility] in late 1970s Second occurrence further up in [facility] in 2012, has been on AC since    • Essential hypertension, benign 06/27/2012   • " GERD (gastroesophageal reflux disease) 06/27/2012   • Heart murmur    • Hyperlipidemia    • Hypertension     hx of, not currently    • Impaired fasting glucose 11/02/2017   • Melanoma ([facility])    • Mild aortic stenosis     Seeing Dr. Van   • Other and unspecified hyperlipidemia 06/27/2012   • Prediabetes    • Protein S deficiency (HCC) 11/02/2017    Noted in lab work 2013 as a part of work up at [facility]    • Rheumatoid arthritis involving multiple sites with positive rheumatoid factor ([facility]) 03/14/2016    Dr. Escoto   • Rheumatoid nodule ([facility]) 07/25/2017   • Right bundle branch block 06/27/2012    pt denies    • Urinary incontinence 11/02/2017       PSHx:   Past Surgical History:   Procedure Laterality Date   • MI REMOVE ARMPITS LYMPH NODES COMPLT Left 6/7/2022    Procedure: LYMPHADENECTOMY, AXILLARY;  Surgeon: Bernardino Torres M.D.;  Location: SURGERY SAME DAY [facility];  Service: General   • BLOCK EPIDURAL STEROID INJECTION Right 5/31/2022    Procedure: RIGHT L4-5 and L5-S1 transforaminal epidural steroid injection with fluoroscopic guidance.;  Surgeon: Indira Lee M.D.;  Location: SURGERY [facility];  Service: Pain Management   • BLOCK EPIDURAL STEROID INJECTION Right 05/10/2022    Procedure: Lumbar L4-5 interlaminar epidural steroid injection;  Surgeon: Indira Lee M.D.;  Location: SURGERY REHAB PAIN [facility]NAGEMENT;  Service: Pain Management   • WIDE EXCISION, LESION, HEAD AND NECK REGION Left 03/29/2022    Procedure: WIDE EXCISION, LESION, HEAD AND NECK REGION - RADICAL MALIGNANT MELANOMA OF LEFT CHEST;  Surgeon: Bernardnio Torres M.D.;  Location: SURGERY SAME DAY [facility];  Service: General   • LUMBAR MEDIAL BRANCH BLOCKS Bilateral 12/15/2020    Procedure: BLOCK, NERVE, SPINAL, LUMBAR, POSTERIOR RAMUS, MEDIAL BRANCH;  Surgeon: Chris Cooper M.D.;  Location: SURGERY [facility]HAB [facility]IN [facility]NAG[facility];  Service: Pain Management   • IRRIGATION & DEBRIDEMENT ORTHO Bilateral 07/31/2020    Procedure: IRRIGATION  AND DEBRIDEMENT, WOUND - KNEE;  Surgeon: Roosevelt Saucedo M.D.;  Location: SURGERY [facility];  Service: Orthopedics   • HEMICOLECTOMY Right 12/13/2018    Procedure: OPEN RIGHT HEMICOLECTOMY;  Surgeon: Dash Bhagat M.D.;  Location: SURGERY [facility];  Service: General   • INGUINAL HERNIA REPAIR Right 09/23/2016    Procedure: INGUINAL HERNIA REPAIR - PRIMARY;  Surgeon: Mary Medina M.D.;  Location: SURGERY [facility];  Service:    • OTHER  08/12/2014    peroneal nerve surgery Dr. Castro    • OTHER ORTHOPEDIC SURGERY  2011    meniscus repair   • ARTHROPLASTY Left     hip replacement   • CHOLECYSTECTOMY     • HYSTERECTOMY, TOTAL ABDOMINAL  1970's   • KNEE ARTHROPLASTY TOTAL Left    • ROTATOR CUFF REPAIR Bilateral        Family Hx:   Family History   Problem Relation Age of Onset   • Cancer Mother         stomach   • Cancer Father         colon   • Leukemia Father         many exposure, worked for [employer]    • Heart Disease Brother         s/p stent        Social Hx:  Social History     Socioeconomic History   • Marital status:      Spouse name: Not on file   • Number of children: Not on file   • Years of education: Not on file   • Highest education level: Not on file   Occupational History   • Not on file   Tobacco Use   • Smoking status: Never Smoker   • Smokeless tobacco: Never Used   Vaping Use   • Vaping Use: Never used   Substance and Sexual Activity   • Alcohol use: Yes     Comment: very rare   • Drug use: Not Currently   • Sexual activity: Not Currently     Partners: Male     Comment:     Other Topics Concern   • Not on file   Social History Narrative    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   Outpatient Medications Marked as Taking for the 6/30/22 encounter (Office Visit) with Indira Lee M.D.   Medication Sig Dispense Refill   • gabapentin (NEURONTIN) 300 MG Cap Take 2 Capsules by mouth at bedtime. 180 Capsule 2   • atorvastatin (LIPITOR) 10 MG Tab Take 1 Tablet by mouth every evening. 90 Tablet 1   • Multiple Vitamins-Minerals (PRESERVISION AREDS 2 PO) every day.     • Calcium Carbonate (CALCIUM 600 PO) Take 300 mg by mouth.     • SODIUM FLUORIDE 5000 PLUS 1.1 % Cream BRUSH WITH PEA SIZED AMOUNT EVERY DAY PREFERABLY BEFORE BEDTIME. SPIT OUT ALL EXCESS. DO NOT RINSE     • omeprazole (PRILOSEC) 20 MG delayed-release capsule Take 1 Capsule by mouth every day. 90 Capsule 3   • lidocaine (LIDODERM) 5 % Patch APPLY ONE PATCH TO CLEAN DRY SKIN AS DIRECTED DAILY 90 Patch 1   • denosumab (PROLIA) 60 MG/ML Solution Prefilled Syringe injection Inject 60 mg under the skin every 6 months.     • Polyethylene Glycol 3350 (MIRALAX PO) Take  by mouth every day at 6 PM. 1 teaspoon daily     • rivaroxaban (XARELTO) 20 MG Tab tablet Take 1 Tablet by mouth with dinner. 90 Tablet 1   • diphenhydrAMINE-APAP, sleep,  MG Tab Take 2 Tablets by mouth at bedtime.     • acetaminophen (TYLENOL) 500 MG Tab Take 500 mg by mouth 2 times a day.     • vitamin D (CHOLECALCIFEROL) 1000 UNIT Tab Take 1,000 Units by mouth every morning.          Current Outpatient Medications on File Prior to Visit   Medication Sig Dispense Refill   • atorvastatin (LIPITOR) 10 MG Tab Take 1 Tablet by mouth every evening. 90 Tablet 1   • Multiple Vitamins-Minerals (PRESERVISION AREDS 2 PO) every day.     • Calcium Carbonate (CALCIUM 600 PO) Take 300 mg by mouth.     • SODIUM  "FLUORIDE 5000 PLUS 1.1 % Cream BRUSH WITH PEA SIZED AMOUNT EVERY DAY PREFERABLY BEFORE BEDTIME. SPIT OUT ALL EXCESS. DO NOT RINSE     • omeprazole (PRILOSEC) 20 MG delayed-release capsule Take 1 Capsule by mouth every day. 90 Capsule 3   • lidocaine (LIDODERM) 5 % Patch APPLY ONE PATCH TO CLEAN DRY SKIN AS DIRECTED DAILY 90 Patch 1   • denosumab (PROLIA) 60 MG/ML Solution Prefilled Syringe injection Inject 60 mg under the skin every 6 months.     • Polyethylene Glycol 3350 (MIRALAX PO) Take  by mouth every day at 6 PM. 1 teaspoon daily     • rivaroxaban (XARELTO) 20 MG Tab tablet Take 1 Tablet by mouth with dinner. 90 Tablet 1   • diphenhydrAMINE-APAP, sleep,  MG Tab Take 2 Tablets by mouth at bedtime.     • acetaminophen (TYLENOL) 500 MG Tab Take 500 mg by mouth 2 times a day.     • vitamin D (CHOLECALCIFEROL) 1000 UNIT Tab Take 1,000 Units by mouth every morning.       No current facility-administered medications on file prior to visit.         EXAMINATION     Physical Exam:   /80 (BP Location: Right arm, Patient Position: Sitting, BP Cuff Size: Adult long)   Pulse 76   Temp 36.6 °C (97.9 °F) (Temporal)   Ht 1.6 m (5' 3\")   Wt 71.9 kg (158 lb 8.2 oz)   SpO2 96%     Constitutional:   Body Habitus: Body mass index is 28.08 kg/m².  Cooperation: Fully cooperates with exam  Appearance: Well-groomed, well-nourished.    Eyes: No scleral icterus to suggest severe liver disease, no proptosis to suggest severe hyperthyroidism    ENT -no obvious auditory deficits, no noticeable facial droop     Skin -no rashes or lesions noted     Respiratory-  breathing comfortably on room air, no audible wheezing    Cardiovascular-distal extremities warm and well perfused.  No lower extremity edema is noted.     Gastrointestinal - no obvious abdominal masses, non-distended    Psychiatric- alert and oriented ×3. Normal affect.     Gait - antalgic gait favoring right leg. Uses cane for balance.     Musculoskeletal and Neuro " -      Thoracic/Lumbar Spine/Sacral Spine/Hips   Inspection: No evidence of atrophy in bilateral lower extremities throughout      ROM: limited active range of motion with lumbar flexion, lateral flexion, and rotation bilaterally.   There is limited active range of motion with lumbar extension     Facet loading maneuver positive on left, neg on right     Palpation:   Tenderness to palpation over the midline of lumbosacral spine, paraspinal muscles bilaterally and lumbar facets bilaterally. No tenderness to palpation elsewhere in the low back/hips including sacroiliac joints bilaterally, PSIS bilaterally and greater trochanters bilaterally.     Lumbar spine /hip provocative exam maneuvers  Straight leg raise positive on right, negative on left  FADIR test negative bilaterally     SI joint tests  JENNIFER test negative bilaterally  SI joint compression positive on right, negative on left  SI joint distraction negative bilaterally  Sacral thrust test positive on right, negative on left  Thigh thrust test positive on right, negative on left  Yeomans maneuver positive on right, negative on left  James finger sign positive on right, negative on left       Key points for the international standards for neurological classification of spinal cord injury (ISNCSCI) to light touch.   Dermatome R L   L2 2 2   L3 2 2   L4 1 2   L5 1 2   S1 1 1   S2 2 2         Motor Exam Lower Extremities  ? Myotome R L   Hip flexion L2 5 5   Knee extension L3 5 5   Ankle dorsiflexion L4 5 5   Toe extension L5 5 5   Ankle plantarflexion S1 5 5         Previous exam  Bilateral hip abductor 4-/5    Thigh thrust test negative bilaterally  SI joint compression negative bilaterally  SI joint distraction negative bilaterally  Sacral thrust test negative bilaterally  Gaenslens maneuver negative bilaterally      Reflexes  ?   R L   Patella   2+ 2+   Achilles    2+ 2+      Clonus of the ankle negative bilaterally       MEDICAL DECISION MAKING    Medical  records review: see under HPI section.     DATA    Labs: Personally reviewed at today's visit    Lab Results   Component Value Date/Time    SODIUM 133 (L) 06/01/2022 11:55 AM    POTASSIUM 4.2 06/01/2022 11:55 AM    CHLORIDE 97 06/01/2022 11:55 AM    CO2 24 06/01/2022 11:55 AM    ANION 12.0 06/01/2022 11:55 AM    GLUCOSE 98 06/01/2022 11:55 AM    BUN 26 (H) 06/01/2022 11:55 AM    CREATININE 0.77 06/01/2022 11:55 AM    CALCIUM 8.9 06/01/2022 11:55 AM    ASTSGOT 17 06/01/2022 11:55 AM    ALTSGPT 21 06/01/2022 11:55 AM    TBILIRUBIN 0.6 06/01/2022 11:55 AM    ALBUMIN 4.4 06/01/2022 11:55 AM    TOTPROTEIN 6.9 06/01/2022 11:55 AM    GLOBULIN 2.5 06/01/2022 11:55 AM    AGRATIO 1.8 06/01/2022 11:55 AM       Lab Results   Component Value Date/Time    PROTHROMBTM 15.2 (H) 03/22/2022 03:25 PM    INR 1.23 (H) 03/22/2022 03:25 PM        Lab Results   Component Value Date/Time    WBC 9.1 06/01/2022 11:55 AM    RBC 4.32 06/01/2022 11:55 AM    HEMOGLOBIN 13.6 06/01/2022 11:55 AM    HEMATOCRIT 40.8 06/01/2022 11:55 AM    MCV 94.4 06/01/2022 11:55 AM    MCH 31.5 06/01/2022 11:55 AM    MCHC 33.3 (L) 06/01/2022 11:55 AM    MPV 11.0 06/01/2022 11:55 AM    NEUTSPOLYS 78.10 (H) 06/01/2022 11:55 AM    LYMPHOCYTES 11.30 (L) 06/01/2022 11:55 AM    MONOCYTES 8.70 06/01/2022 11:55 AM    EOSINOPHILS 1.20 06/01/2022 11:55 AM    BASOPHILS 0.30 06/01/2022 11:55 AM        Lab Results   Component Value Date/Time    HBA1C 5.5 05/16/2016 07:50 AM      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 Mild bilatearl neuroforaminal stenosis at L4-L5 and L5-S1. Facet arthropathy worst at bilateral L3-L4, L4-L5, and L5-S1. Grade 1 anterolisthesis of L4 on L5.     MRI lumbar spine 5/26/20  Severe bilateral facet arthropathy at L4-L5. Moderate-severe facet  arthropathy at bilateral L3-L4. Grade 1 anterolisthesis of L4 on L5. Mod-severe right neuroforaminal stenosis at L3-L4, mild left neuroforaminal stenosis at L3-L4. Moderate central stenosis at L3-4.               IMAGING radiology reads. I reviewed the following radiology reads                      Results for orders placed during the hospital encounter of 03/31/22    MR-LUMBAR SPINE-W/O    Impression  Interval worsening L3/4 central protrusion results in worsening moderate to severe central canal and moderate left foraminal stenoses    Mild worsening T12/L1 degenerative disc disease resulting in new mild central stenosis    Otherwise stable multilevel degenerative change resulting in foraminal predominate stenoses as detailed above    Stable L4/5 grade 1 anterolisthesis of secondary to severe facet arthropathy. Stable minimal degenerative retrolisthesis of the upper lumbar levels    Mature L5/S1 interbody fusion                 Results for orders placed during the hospital encounter of 05/26/20     MR-LUMBAR SPINE-W/O     Impression  Interval worsening L3/4 central protrusion results in worsening moderate central stenosis     Otherwise stable multilevel degenerative change resulting in foraminal predominate stenoses, greatest is moderate to severe on the right at L3/4.     Lesser stenoses at other levels as detailed above     Stable L4/5 grade 1 anterolisthesis of secondary to severe facet arthropathy. Stable minimal degenerative retrolisthesis of the upper lumbar levels     Mature L5/S1 interbody fusion      Results for orders placed during the hospital encounter of 11/01/16     DX-LUMBAR SPINE-2 OR 3 VIEWS     Impression  1.  There has been progression of mild diffuse degenerative disc disease and facet disease throughout the entire lumbar spine.  2.  There are degenerative areas of anterolisthesis and retrolisthesis as noted above.      Results for orders placed during the hospital encounter of  20     DX-LUMBAR SPINE-4+ VIEWS     Impression  1.  No compression deformity or acute fracture is identified.     2.  There is grade 2 anterolisthesis now identified at L4-L5 which is significantly increased compared to the prior exam. There appears to be L4 spondylolysis.     3.  Some interval increase in degenerative disc disease and facet arthropathy.     4.  No focal instability noted on flexion extension views.         Diagnosis  Visit Diagnoses     ICD-10-CM   1. Sacroiliac joint dysfunction of right side  M53.3   2. Lumbar spondylosis  M47.816   3. Chronic bilateral low back pain with right-sided sciatica  M54.41    G89.29   4. Spinal stenosis of lumbar region, unspecified whether neurogenic claudication present  M48.061   5. Rheumatoid arthritis involving multiple sites with positive rheumatoid factor (HCC)  M05.79   6. Neuropathy  G62.9   7. Numbness and tingling of right leg  R20.0    R20.2   8. Lumbar radiculitis  M54.16         ASSESSMENT AND PLAN:  Marry Mathur (: 1935) is a female with history of osteoporosis, protein S deficiency, left TKA, left CARLYLE, RA, HLD, HTN, GERD, DVT on chronic Xarelto, and neuropathy in bilateral feet who presents with progressively ongoing severe low back pain radiating down her right leg and pain-limited right leg weakness consistent with lumbar radiculopathy likely 2/2 neuroforaminal stenosis at right L3-L4 and right L4-L5 on MRI 3/31/22. Her worst pain is in her right low back radiating down her right leg in the right L4 and L5 distribution.  Only partial slight relief with ILESI at right L4-L5, and right L4-L5 and L5-S1 transforaminal epidural steroid injection    On today's exam she appears to have right sacroiliac joint dysfunction, with positive R SI joint compression, sacral thrust test, thigh thrust test, Yeomans, and James finger sign.    Also has positive right-sided lumbar facet loading maneuver suggestive of lumbar facetogenic  pain    She endorses worsening pain with lumbar extension improved with lumbar flexion. Denies left leg symptoms. Lumbar spinal stenosis with neurogenic claudication is in the differential although I feel it is less likely to be the main etiology of her symptoms at this time.     Marry was seen today for follow-up.    Diagnoses and all orders for this visit:    Sacroiliac joint dysfunction of right side  -     Referral to Pain Clinic    Lumbar spondylosis    Chronic bilateral low back pain with right-sided sciatica    Spinal stenosis of lumbar region, unspecified whether neurogenic claudication present    Rheumatoid arthritis involving multiple sites with positive rheumatoid factor (HCC)    Neuropathy    Numbness and tingling of right leg    Lumbar radiculitis    Other orders  -     gabapentin (NEURONTIN) 300 MG Cap; Take 2 Capsules by mouth at bedtime.          PLAN  Physical Therapy: continue PT.      Diagnostic workup: no new imaging needed at this time. Again reviewed at today's visit and discussed with patient: MRI lumbar spine 3/31/22     Medications:  - increase Gabapentin to 600mg QHS. Represcribed 300mg tablets today for ease of taking medication.  Counseled that she may experience side effect of drowsiness while her body is adjusting to the medicine and that if this interferes with her quality of life or ability to perform ADLs, she should decrease it back to 400 mg nightly  - NSAIDs contraindicated due to being on chronic anticoagulation for protein S deficiency and hx of DVT     Interventions:  - right sacroiliac joint injection. The risks, benefits, and alternatives to this procedure were discussed and the patient wishes to proceed with the procedure. Risks include but are not limited to damage to surrounding structures, infection, bleeding, worsening of pain which can be permanent, and weakness which can be permanent. Benefits include pain relief and improved function. Alternatives include not doing  the procedure.    - s/p right L4-L5 and L5-S1 transforaminal epidural steroid injection.   - s/p right Lumbar L4-5 interlaminar epidural steroid injection under fluoroscopic guidance.   - requesting pt hold Xarelto for 3 days prior and restart 24 hours after - previously received clearance     Follow-up: 3 weeks after procedure    Indira Lee MD  Interventional Pain and Spine  Physical Medicine and Rehabilitation  [facility]    The above note documents my personal evaluation of this patient. In addition, I have reviewed and confirmed with the patient and MA the supportive information documented in today's Patient Health Questionnaire and Office Note.     Please note that this dictation was created using voice recognition software. I have made every reasonable attempt to correct obvious errors, but I expect that there are errors of grammar and possibly content that I did not discover before finalizing the note.

## 2022-06-28 NOTE — PROGRESS NOTES
Follow-up patient Note    Interventional Pain and Spine  Physiatry (Physical Medicine and Rehabilitation)     Patient Name: Marry Mathur  : 1935  Date of Service: 2022      Chief Complaint:   Chief Complaint   Patient presents with   • Follow-Up     Right leg pain       HISTORY 3/17/22  Marry Mathur is a 86 y.o. female who presents today with constant low back pain and burning lateral right leg pain that started a couple of years ago with no known inciting event. States she has seen Dr. Cooper for multiple years, most recently 4 months ago, but feels that her pain continues to worsen so she seeks to reestablish care with a new pain provider.     Her pain at its best-worse level during the course of the day is 4-9/10, respectively. Pain right now is 7/10 on the numeric pain scale. Pain worsens with standing and walking and improves slightly with rest and sitting. Her pain interferes somewhat with ADLs. Pain interferes with her ability to work in her yard.  The patient denies new weakness, numbness, or bladder/bowel incontinence. Denies pain in her left leg. Endorses history of neuropathy in feet with no known inciting event. Had an EMG done with Dr. Booth which revealed a left peroneal axonal mononeuropathy per chart. Has hx of RA, feels that her RA symptoms are stable.     Notes that she is able to ambulate more easily by bending forward and using a shopping cart. Notes that propping her legs in a recliner improves her pain.     On xarelto. Seeing Dr. Rodney. Pt states she has been able to stop xarelto for procedures in the past.     The patient is currently going to physical therapy for this problem, most recently last week. Endorses improvement with dry needling lasting 12-24h.     Patient has tried the following medications with varied success (current meds in bold):   Gabapentin 300mg QHS  Tylenol 500mg BID     Also taking fish oil     Therapeutic modalities and  interventional therapies to date include:  -multiple per chart. Pt states the most helpful were bilateral 2-level facet joint injections with helped for a few months  - Lumbar epidural  - pt notes she has had multiple other injections that are not viewable in her [facility] chart     Medical history includes osteoporosis, protein S deficiency, left TKA, left CARLYLE, RA, HLD, HTN, GERD, DVT     Denies hx of back surgery.    Notes from outside provider [facility] reviewed. Indicating:  - L3-4 ILESI on 20  - right L3-4 ILESI on 10/29/2020 - 30% pain relief  - bilateral L4-5 and L5-S1 facet joint injections 12/15/2020 - 50-70% pain relief  - right L3-4 ILESI on 21 - 50% pain relief  - right L2-L4 MBB on 21 with significant pain relief, right L2-L4 MBB on 9/3/21 with significant pain relief, right L2-L4 RFA 10/15/21  One note mentions resolution of leg pain after procedure targeting the facet    HPI  Today's visit   Marry Mathur ( 1935) is a female with The primary encounter diagnosis was Sacroiliac joint dysfunction of right side. Diagnoses of Lumbar spondylosis, Chronic bilateral low back pain with right-sided sciatica, Spinal stenosis of lumbar region, unspecified whether neurogenic claudication present, Rheumatoid arthritis involving multiple sites with positive rheumatoid factor (HCC), Neuropathy, Numbness and tingling of right leg, and Lumbar radiculitis were also pertinent to this visit.    S/p 22 right L4-L5 and L5-S1 transforaminal epidural steroid injection with initial 25-30% improvement in pain for 1.5 weeks, then worsening pain. Saw PT. Has had new leg cramps which interfere with sleep. Having new burning pain at medial right thigh. PT thinks she could have component of SIJ pain.    Taking gabapentin 400mg QHS. Have trouble falling asleep and is unsure why.    Seeing Dr. Easley from oncology and planning to also get another evaluation with [facility] oncology  "next week. Currently awaiting immunotherapy treatment for metastatic melanoma.    Pain continues to limit her ability to perform ADLs and garden.    Ongoing difficulty with prolonged standing. Notes she is able to walk for about 20-30 minutes before needing to take a break.  Uses a shopping cart helps.    She continues to take gabapentin 400 milligrams nightly and Tylenol as needed.  Denies side effects.  Endorses ongoing pain limited weakness.  Pt denies new numbness, tingling, or weakness.    Medical records review: op report 6/7/22 indicating melanoma excision. Currently awaiting immunotherapy treatment. Also reviewed rheum note 6/27/22    Procedure history:  - 5/10/22 right Lumbar L4-5 interlaminar epidural steroid injection under fluoroscopic guidance - 10-15% pain relief x 2-3 days  - 5/31/22 right L4-L5 and L5-S1 transforaminal epidural steroid injection - initial 25-30% improvement in pain for 1.5 weeks, then worsening pain.        ROS:   Red Flags ROS:   Fever, Chills, Sweats: Denies  Involuntary Weight Loss: Denies  Bladder Incontinence: Denies  Bowel Incontinence: denies  Saddle Anesthesia: Denies    All other systems reviewed and negative.     PMHx:   Past Medical History:   Diagnosis Date   • Adenocarcinoma of colon (HCC) 10/30/2018    From sessile serrated polyp 10/2018   • Anesthesia     pt states \"one new dr scraped my esophagus when they put the tube in and I started bleeding so they couldn't operate\"    • Atrial fibrillation [I48.91] 04/19/2016     pt denies    • Back pain 06/01/2022    0/10   • Blood clotting disorder (HCC) 2012    clot in leg   • Bowel habit changes     constipation    • Cancer (HCC)     skin, colon 2018   • Cataract     belia IOL    • Chronic back pain greater than 3 months duration    • Deep vein thrombosis (HCC) 03/08/2016    First occurrence in [facility] in late 1970s Second occurrence further up in [facility] in 2012, has been on AC since    • Essential hypertension, benign 06/27/2012   • " GERD (gastroesophageal reflux disease) 06/27/2012   • Heart murmur    • Hyperlipidemia    • Hypertension     hx of, not currently    • Impaired fasting glucose 11/02/2017   • Melanoma (Former[facility])    • Mild aortic stenosis     Seeing Dr. Van   • Other and unspecified hyperlipidemia 06/27/2012   • Prediabetes    • Protein S deficiency (HCC) 11/02/2017    Noted in lab work 2013 as a part of work up at [facility]    • Rheumatoid arthritis involving multiple sites with positive rheumatoid factor (Formerly [facility]) 03/14/2016    Dr. Escoto   • Rheumatoid nodule (Former[facility]) 07/25/2017   • Right bundle branch block 06/27/2012    pt denies    • Urinary incontinence 11/02/2017       PSHx:   Past Surgical History:   Procedure Laterality Date   • OR REMOVE ARMPITS LYMPH NODES COMPLT Left 6/7/2022    Procedure: LYMPHADENECTOMY, AXILLARY;  Surgeon: Bernardino Torres M.D.;  Location: SURGERY SAME DAY [facility];  Service: General   • BLOCK EPIDURAL STEROID INJECTION Right 5/31/2022    Procedure: RIGHT L4-5 and L5-S1 transforaminal epidural steroid injection with fluoroscopic guidance.;  Surgeon: Indira Lee M.D.;  Location: SURGERY REHAB PAIN MANAGEMENT;  Service: Pain Management   • BLOCK EPIDURAL STEROID INJECTION Right 05/10/2022    Procedure: Lumbar L4-5 interlaminar epidural steroid injection;  Surgeon: Indira Lee M.D.;  Location: SURGERY REHAB PAIN MANAGEMENT;  Service: Pain Management   • WIDE EXCISION, LESION, HEAD AND NECK REGION Left 03/29/2022    Procedure: WIDE EXCISION, LESION, HEAD AND NECK REGION - RADICAL MALIGNANT MELANOMA OF LEFT CHEST;  Surgeon: Bernardino Torres M.D.;  Location: SURGERY SAME DAY [facility];  Service: General   • LUMBAR MEDIAL BRANCH BLOCKS Bilateral 12/15/2020    Procedure: BLOCK, NERVE, SPINAL, LUMBAR, POSTERIOR RAMUS, MEDIAL BRANCH;  Surgeon: Chris Cooper M.D.;  Location: SURGERY REHAB PAIN MANAGEMENT;  Service: Pain Management   • IRRIGATION & DEBRIDEMENT ORTHO Bilateral 07/31/2020    Procedure: IRRIGATION  AND DEBRIDEMENT, WOUND - KNEE;  Surgeon: Roosevelt Saucedo M.D.;  Location: SURGERY [facility];  Service: Orthopedics   • HEMICOLECTOMY Right 12/13/2018    Procedure: OPEN RIGHT HEMICOLECTOMY;  Surgeon: Dash Bhagat M.D.;  Location: SURGERY [facility];  Service: General   • INGUINAL HERNIA REPAIR Right 09/23/2016    Procedure: INGUINAL HERNIA REPAIR - PRIMARY;  Surgeon: Mary Medina M.D.;  Location: SURGERY [facility];  Service:    • OTHER  08/12/2014    peroneal nerve surgery Dr. Castro    • OTHER ORTHOPEDIC SURGERY  2011    meniscus repair   • ARTHROPLASTY Left     hip replacement   • CHOLECYSTECTOMY     • HYSTERECTOMY, TOTAL ABDOMINAL  1970's   • KNEE ARTHROPLASTY TOTAL Left    • ROTATOR CUFF REPAIR Bilateral        Family Hx:   Family History   Problem Relation Age of Onset   • Cancer Mother         stomach   • Cancer Father         colon   • Leukemia Father         many exposure, worked for [employer]    • Heart Disease Brother         s/p stent        Social Hx:  Social History     Socioeconomic History   • Marital status:      Spouse name: Not on file   • Number of children: Not on file   • Years of education: Not on file   • Highest education level: Not on file   Occupational History   • Not on file   Tobacco Use   • Smoking status: Never Smoker   • Smokeless tobacco: Never Used   Vaping Use   • Vaping Use: Never used   Substance and Sexual Activity   • Alcohol use: Yes     Comment: very rare   • Drug use: Not Currently   • Sexual activity: Not Currently     Partners: Male     Comment:     Other Topics Concern   • Not on file   Social History Narrative    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   Outpatient Medications Marked as Taking for the 6/30/22 encounter (Office Visit) with Indira Lee M.D.   Medication Sig Dispense Refill   • gabapentin (NEURONTIN) 300 MG Cap Take 2 Capsules by mouth at bedtime. 180 Capsule 2   • atorvastatin (LIPITOR) 10 MG Tab Take 1 Tablet by mouth every evening. 90 Tablet 1   • Multiple Vitamins-Minerals (PRESERVISION AREDS 2 PO) every day.     • Calcium Carbonate (CALCIUM 600 PO) Take 300 mg by mouth.     • SODIUM FLUORIDE 5000 PLUS 1.1 % Cream BRUSH WITH PEA SIZED AMOUNT EVERY DAY PREFERABLY BEFORE BEDTIME. SPIT OUT ALL EXCESS. DO NOT RINSE     • omeprazole (PRILOSEC) 20 MG delayed-release capsule Take 1 Capsule by mouth every day. 90 Capsule 3   • lidocaine (LIDODERM) 5 % Patch APPLY ONE PATCH TO CLEAN DRY SKIN AS DIRECTED DAILY 90 Patch 1   • denosumab (PROLIA) 60 MG/ML Solution Prefilled Syringe injection Inject 60 mg under the skin every 6 months.     • Polyethylene Glycol 3350 (MIRALAX PO) Take  by mouth every day at 6 PM. 1 teaspoon daily     • rivaroxaban (XARELTO) 20 MG Tab tablet Take 1 Tablet by mouth with dinner. 90 Tablet 1   • diphenhydrAMINE-APAP, sleep,  MG Tab Take 2 Tablets by mouth at bedtime.     • acetaminophen (TYLENOL) 500 MG Tab Take 500 mg by mouth 2 times a day.     • vitamin D (CHOLECALCIFEROL) 1000 UNIT Tab Take 1,000 Units by mouth every morning.          Current Outpatient Medications on File Prior to Visit   Medication Sig Dispense Refill   • atorvastatin (LIPITOR) 10 MG Tab Take 1 Tablet by mouth every evening. 90 Tablet 1   • Multiple Vitamins-Minerals (PRESERVISION AREDS 2 PO) every day.     • Calcium Carbonate (CALCIUM 600 PO) Take 300 mg by mouth.     • SODIUM  "FLUORIDE 5000 PLUS 1.1 % Cream BRUSH WITH PEA SIZED AMOUNT EVERY DAY PREFERABLY BEFORE BEDTIME. SPIT OUT ALL EXCESS. DO NOT RINSE     • omeprazole (PRILOSEC) 20 MG delayed-release capsule Take 1 Capsule by mouth every day. 90 Capsule 3   • lidocaine (LIDODERM) 5 % Patch APPLY ONE PATCH TO CLEAN DRY SKIN AS DIRECTED DAILY 90 Patch 1   • denosumab (PROLIA) 60 MG/ML Solution Prefilled Syringe injection Inject 60 mg under the skin every 6 months.     • Polyethylene Glycol 3350 (MIRALAX PO) Take  by mouth every day at 6 PM. 1 teaspoon daily     • rivaroxaban (XARELTO) 20 MG Tab tablet Take 1 Tablet by mouth with dinner. 90 Tablet 1   • diphenhydrAMINE-APAP, sleep,  MG Tab Take 2 Tablets by mouth at bedtime.     • acetaminophen (TYLENOL) 500 MG Tab Take 500 mg by mouth 2 times a day.     • vitamin D (CHOLECALCIFEROL) 1000 UNIT Tab Take 1,000 Units by mouth every morning.       No current facility-administered medications on file prior to visit.         EXAMINATION     Physical Exam:   /80 (BP Location: Right arm, Patient Position: Sitting, BP Cuff Size: Adult long)   Pulse 76   Temp 36.6 °C (97.9 °F) (Temporal)   Ht 1.6 m (5' 3\")   Wt 71.9 kg (158 lb 8.2 oz)   SpO2 96%     Constitutional:   Body Habitus: Body mass index is 28.08 kg/m².  Cooperation: Fully cooperates with exam  Appearance: Well-groomed, well-nourished.    Eyes: No scleral icterus to suggest severe liver disease, no proptosis to suggest severe hyperthyroidism    ENT -no obvious auditory deficits, no noticeable facial droop     Skin -no rashes or lesions noted     Respiratory-  breathing comfortably on room air, no audible wheezing    Cardiovascular-distal extremities warm and well perfused.  No lower extremity edema is noted.     Gastrointestinal - no obvious abdominal masses, non-distended    Psychiatric- alert and oriented ×3. Normal affect.     Gait - antalgic gait favoring right leg. Uses cane for balance.     Musculoskeletal and Neuro " -      Thoracic/Lumbar Spine/Sacral Spine/Hips   Inspection: No evidence of atrophy in bilateral lower extremities throughout      ROM: limited active range of motion with lumbar flexion, lateral flexion, and rotation bilaterally.   There is limited active range of motion with lumbar extension     Facet loading maneuver positive on left, neg on right     Palpation:   Tenderness to palpation over the midline of lumbosacral spine, paraspinal muscles bilaterally and lumbar facets bilaterally. No tenderness to palpation elsewhere in the low back/hips including sacroiliac joints bilaterally, PSIS bilaterally and greater trochanters bilaterally.     Lumbar spine /hip provocative exam maneuvers  Straight leg raise positive on right, negative on left  FADIR test negative bilaterally     SI joint tests  JENNIFER test negative bilaterally  SI joint compression positive on right, negative on left  SI joint distraction negative bilaterally  Sacral thrust test positive on right, negative on left  Thigh thrust test positive on right, negative on left  Yeomans maneuver positive on right, negative on left  James finger sign positive on right, negative on left       Key points for the international standards for neurological classification of spinal cord injury (ISNCSCI) to light touch.   Dermatome R L   L2 2 2   L3 2 2   L4 1 2   L5 1 2   S1 1 1   S2 2 2         Motor Exam Lower Extremities  ? Myotome R L   Hip flexion L2 5 5   Knee extension L3 5 5   Ankle dorsiflexion L4 5 5   Toe extension L5 5 5   Ankle plantarflexion S1 5 5         Previous exam  Bilateral hip abductor 4-/5    Thigh thrust test negative bilaterally  SI joint compression negative bilaterally  SI joint distraction negative bilaterally  Sacral thrust test negative bilaterally  Gaenslens maneuver negative bilaterally      Reflexes  ?   R L   Patella   2+ 2+   Achilles    2+ 2+      Clonus of the ankle negative bilaterally       MEDICAL DECISION MAKING    Medical  records review: see under HPI section.     DATA    Labs: Personally reviewed at today's visit    Lab Results   Component Value Date/Time    SODIUM 133 (L) 06/01/2022 11:55 AM    POTASSIUM 4.2 06/01/2022 11:55 AM    CHLORIDE 97 06/01/2022 11:55 AM    CO2 24 06/01/2022 11:55 AM    ANION 12.0 06/01/2022 11:55 AM    GLUCOSE 98 06/01/2022 11:55 AM    BUN 26 (H) 06/01/2022 11:55 AM    CREATININE 0.77 06/01/2022 11:55 AM    CALCIUM 8.9 06/01/2022 11:55 AM    ASTSGOT 17 06/01/2022 11:55 AM    ALTSGPT 21 06/01/2022 11:55 AM    TBILIRUBIN 0.6 06/01/2022 11:55 AM    ALBUMIN 4.4 06/01/2022 11:55 AM    TOTPROTEIN 6.9 06/01/2022 11:55 AM    GLOBULIN 2.5 06/01/2022 11:55 AM    AGRATIO 1.8 06/01/2022 11:55 AM       Lab Results   Component Value Date/Time    PROTHROMBTM 15.2 (H) 03/22/2022 03:25 PM    INR 1.23 (H) 03/22/2022 03:25 PM        Lab Results   Component Value Date/Time    WBC 9.1 06/01/2022 11:55 AM    RBC 4.32 06/01/2022 11:55 AM    HEMOGLOBIN 13.6 06/01/2022 11:55 AM    HEMATOCRIT 40.8 06/01/2022 11:55 AM    MCV 94.4 06/01/2022 11:55 AM    MCH 31.5 06/01/2022 11:55 AM    MCHC 33.3 (L) 06/01/2022 11:55 AM    MPV 11.0 06/01/2022 11:55 AM    NEUTSPOLYS 78.10 (H) 06/01/2022 11:55 AM    LYMPHOCYTES 11.30 (L) 06/01/2022 11:55 AM    MONOCYTES 8.70 06/01/2022 11:55 AM    EOSINOPHILS 1.20 06/01/2022 11:55 AM    BASOPHILS 0.30 06/01/2022 11:55 AM        Lab Results   Component Value Date/Time    HBA1C 5.5 05/16/2016 07:50 AM      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 Mild bilatearl neuroforaminal stenosis at L4-L5 and L5-S1. Facet arthropathy worst at bilateral L3-L4, L4-L5, and L5-S1. Grade 1 anterolisthesis of L4 on L5.     MRI lumbar spine 5/26/20  Severe bilateral facet arthropathy at L4-L5. Moderate-severe facet  arthropathy at bilateral L3-L4. Grade 1 anterolisthesis of L4 on L5. Mod-severe right neuroforaminal stenosis at L3-L4, mild left neuroforaminal stenosis at L3-L4. Moderate central stenosis at L3-4.               IMAGING radiology reads. I reviewed the following radiology reads                      Results for orders placed during the hospital encounter of 03/31/22    MR-LUMBAR SPINE-W/O    Impression  Interval worsening L3/4 central protrusion results in worsening moderate to severe central canal and moderate left foraminal stenoses    Mild worsening T12/L1 degenerative disc disease resulting in new mild central stenosis    Otherwise stable multilevel degenerative change resulting in foraminal predominate stenoses as detailed above    Stable L4/5 grade 1 anterolisthesis of secondary to severe facet arthropathy. Stable minimal degenerative retrolisthesis of the upper lumbar levels    Mature L5/S1 interbody fusion                 Results for orders placed during the hospital encounter of 05/26/20     MR-LUMBAR SPINE-W/O     Impression  Interval worsening L3/4 central protrusion results in worsening moderate central stenosis     Otherwise stable multilevel degenerative change resulting in foraminal predominate stenoses, greatest is moderate to severe on the right at L3/4.     Lesser stenoses at other levels as detailed above     Stable L4/5 grade 1 anterolisthesis of secondary to severe facet arthropathy. Stable minimal degenerative retrolisthesis of the upper lumbar levels     Mature L5/S1 interbody fusion      Results for orders placed during the hospital encounter of 11/01/16     DX-LUMBAR SPINE-2 OR 3 VIEWS     Impression  1.  There has been progression of mild diffuse degenerative disc disease and facet disease throughout the entire lumbar spine.  2.  There are degenerative areas of anterolisthesis and retrolisthesis as noted above.      Results for orders placed during the hospital encounter of  20     DX-LUMBAR SPINE-4+ VIEWS     Impression  1.  No compression deformity or acute fracture is identified.     2.  There is grade 2 anterolisthesis now identified at L4-L5 which is significantly increased compared to the prior exam. There appears to be L4 spondylolysis.     3.  Some interval increase in degenerative disc disease and facet arthropathy.     4.  No focal instability noted on flexion extension views.         Diagnosis  Visit Diagnoses     ICD-10-CM   1. Sacroiliac joint dysfunction of right side  M53.3   2. Lumbar spondylosis  M47.816   3. Chronic bilateral low back pain with right-sided sciatica  M54.41    G89.29   4. Spinal stenosis of lumbar region, unspecified whether neurogenic claudication present  M48.061   5. Rheumatoid arthritis involving multiple sites with positive rheumatoid factor (HCC)  M05.79   6. Neuropathy  G62.9   7. Numbness and tingling of right leg  R20.0    R20.2   8. Lumbar radiculitis  M54.16         ASSESSMENT AND PLAN:  Marry Mathur (: 1935) is a female with history of osteoporosis, protein S deficiency, left TKA, left CARLYLE, RA, HLD, HTN, GERD, DVT on chronic Xarelto, and neuropathy in bilateral feet who presents with progressively ongoing severe low back pain radiating down her right leg and pain-limited right leg weakness consistent with lumbar radiculopathy likely 2/2 neuroforaminal stenosis at right L3-L4 and right L4-L5 on MRI 3/31/22. Her worst pain is in her right low back radiating down her right leg in the right L4 and L5 distribution.  Only partial slight relief with ILESI at right L4-L5, and right L4-L5 and L5-S1 transforaminal epidural steroid injection    On today's exam she appears to have right sacroiliac joint dysfunction, with positive R SI joint compression, sacral thrust test, thigh thrust test, Yeomans, and James finger sign.    Also has positive right-sided lumbar facet loading maneuver suggestive of lumbar facetogenic  pain    She endorses worsening pain with lumbar extension improved with lumbar flexion. Denies left leg symptoms. Lumbar spinal stenosis with neurogenic claudication is in the differential although I feel it is less likely to be the main etiology of her symptoms at this time.     Marry was seen today for follow-up.    Diagnoses and all orders for this visit:    Sacroiliac joint dysfunction of right side  -     Referral to Pain Clinic    Lumbar spondylosis    Chronic bilateral low back pain with right-sided sciatica    Spinal stenosis of lumbar region, unspecified whether neurogenic claudication present    Rheumatoid arthritis involving multiple sites with positive rheumatoid factor (HCC)    Neuropathy    Numbness and tingling of right leg    Lumbar radiculitis    Other orders  -     gabapentin (NEURONTIN) 300 MG Cap; Take 2 Capsules by mouth at bedtime.          PLAN  Physical Therapy: continue PT.      Diagnostic workup: no new imaging needed at this time. Again reviewed at today's visit and discussed with patient: MRI lumbar spine 3/31/22     Medications:  - increase Gabapentin to 600mg QHS. Represcribed 300mg tablets today for ease of taking medication.  Counseled that she may experience side effect of drowsiness while her body is adjusting to the medicine and that if this interferes with her quality of life or ability to perform ADLs, she should decrease it back to 400 mg nightly  - NSAIDs contraindicated due to being on chronic anticoagulation for protein S deficiency and hx of DVT     Interventions:  - right sacroiliac joint injection. The risks, benefits, and alternatives to this procedure were discussed and the patient wishes to proceed with the procedure. Risks include but are not limited to damage to surrounding structures, infection, bleeding, worsening of pain which can be permanent, and weakness which can be permanent. Benefits include pain relief and improved function. Alternatives include not doing  the procedure.    - s/p right L4-L5 and L5-S1 transforaminal epidural steroid injection.   - s/p right Lumbar L4-5 interlaminar epidural steroid injection under fluoroscopic guidance.   - requesting pt hold Xarelto for 3 days prior and restart 24 hours after - previously received clearance     Follow-up: 3 weeks after procedure    Indira Lee MD  Interventional Pain and Spine  Physical Medicine and Rehabilitation  [facility]    The above note documents my personal evaluation of this patient. In addition, I have reviewed and confirmed with the patient and MA the supportive information documented in today's Patient Health Questionnaire and Office Note.     Please note that this dictation was created using voice recognition software. I have made every reasonable attempt to correct obvious errors, but I expect that there are errors of grammar and possibly content that I did not discover before finalizing the note.

## 2022-06-29 ENCOUNTER — OFFICE VISIT (OUTPATIENT)
Dept: CARDIOLOGY | Facility: MEDICAL CENTER | Age: 87
End: 2022-06-29
Payer: MEDICARE

## 2022-06-29 VITALS
OXYGEN SATURATION: 98 % | WEIGHT: 156 LBS | RESPIRATION RATE: 14 BRPM | BODY MASS INDEX: 27.64 KG/M2 | DIASTOLIC BLOOD PRESSURE: 82 MMHG | HEART RATE: 56 BPM | HEIGHT: 63 IN | SYSTOLIC BLOOD PRESSURE: 120 MMHG

## 2022-06-29 DIAGNOSIS — Z85.038 HISTORY OF COLON CANCER: ICD-10-CM

## 2022-06-29 DIAGNOSIS — I45.10 RIGHT BUNDLE BRANCH BLOCK: ICD-10-CM

## 2022-06-29 DIAGNOSIS — I82.5Y2 CHRONIC DEEP VEIN THROMBOSIS (DVT) OF PROXIMAL VEIN OF LEFT LOWER EXTREMITY (HCC): ICD-10-CM

## 2022-06-29 DIAGNOSIS — C43.9 MALIGNANT MELANOMA, UNSPECIFIED SITE (HCC): ICD-10-CM

## 2022-06-29 DIAGNOSIS — I35.0 NONRHEUMATIC AORTIC VALVE STENOSIS: ICD-10-CM

## 2022-06-29 DIAGNOSIS — I10 ESSENTIAL HYPERTENSION, BENIGN: ICD-10-CM

## 2022-06-29 DIAGNOSIS — M05.79 RHEUMATOID ARTHRITIS INVOLVING MULTIPLE SITES WITH POSITIVE RHEUMATOID FACTOR (HCC): Chronic | ICD-10-CM

## 2022-06-29 PROCEDURE — 99214 OFFICE O/P EST MOD 30 MIN: CPT | Performed by: INTERNAL MEDICINE

## 2022-06-29 ASSESSMENT — FIBROSIS 4 INDEX: FIB4 SCORE: 1.54

## 2022-06-29 NOTE — PROGRESS NOTES
CARDIOLOGY STRUCTURAL HEART CONSULTATION    PCP: KINSEY Horner.  REFERRING : Rhona Rodney M.D.    1. Nonrheumatic aortic valve stenosis    2. Right bundle branch block    3. Essential hypertension, benign    4. 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 Rheumatoid arthritis involving multiple sites with positive rheumatoid factor (HCC)    6. History of colon cancer - s/p resection    7. Malignant melanoma, unspecified site (HCC)        Marry Mathur has moderate aortic stenosis by echo  and a physical exam consistent with severe stenosis.  She is limited by back/leg pain and is also starting immunotherapy for melanoma, metastasis to the lymph nodes.  I advised she track symptoms closely and notify me of any changes.  I will also arrange for her to complete a BNP.      She will be seen by Dr. Van in early August and myself in early October      Chief Complaint   Patient presents with   • Aortic Stenosis     F/V Dx: Nonrheumatic aortic valve stenosis       History: Marry Mathur is a 86 y.o. female with a history of rheumatoid arthritis, Olson syndrome with colon cancer, recurrent DVT and rheumatoid arthritis presenting for assessment of aortic stenosis.  She was also recently diagnosed with melanoma metastatic to the lymph node and is recommended to start immunotherapy.  She also has chronic pain in the right leg and lower back which limits her and she has had limited success with injections.  She is planning to see her spine specialist again soon.    She continues to garden as she has in years past and remains limited by her leg without any symptoms of shortness of breath, fatigue chest discomfort, syncope or heart failure.    A recent echocardiogram showed moderate aortic stenosis with a mean gradient of 33 mmHg, V-max of 3.8 m/s.       ROS:   10 point review systems is otherwise negative except as per the HPI    PE:  /82  "(BP Location: Left arm, Patient Position: Sitting, BP Cuff Size: Adult)   Pulse (!) 56   Resp 14   Ht 1.6 m (5' 3\")   Wt 70.8 kg (156 lb)   LMP  (LMP Unknown)   SpO2 98%   BMI 27.63 kg/m²   Gen: no acute distress  HEENT: Symmetric face. Anicteric sclerae. Moist mucus membranes  NECK: No JVD. No lymphadenopathy  CARDIAC: Regular, Normal S1, S2, +systolic murmur  VASCULATURE: Diminished and delayed upstroke  RESP: Clear to auscultation bilaterally  ABD: Soft, non-tender, non-distended  EXT: No edema, venous stasis changes bilaterally, no clubbing or cyanosis  SKIN: Warm and dry  NEURO: No gross deficits  PSYCH: Appropriate affect, participates in conversation    Past Medical History:   Diagnosis Date   • Adenocarcinoma of colon (HCC) 10/30/2018    From sessile serrated polyp 10/2018   • Anesthesia     pt states \"one new dr scraped my esophagus when they put the tube in and I started bleeding so they couldn't operate\"    • Atrial fibrillation [I48.91] 04/19/2016     pt denies    • Back pain 06/01/2022    0/10   • Blood clotting disorder (HCC) 2012    clot in leg   • Bowel habit changes     constipation    • Cancer (HCC)     skin, colon 2018   • Cataract     belia IOL    • Chronic back pain greater than 3 months duration    • Deep vein thrombosis (HCC) 03/08/2016    First occurrence in [facility] in late 1970s Second occurrence further up in [facility] in 2012, has been on AC since    • Essential hypertension, benign 06/27/2012   • GERD (gastroesophageal reflux disease) 06/27/2012   • Heart murmur    • Hyperlipidemia    • Hypertension     hx of, not currently    • Impaired fasting glucose 11/02/2017   • Melanoma (HCC)    • Mild aortic stenosis     Seeing Dr. Van   • Other and unspecified hyperlipidemia 06/27/2012   • Prediabetes    • Protein S deficiency (HCC) 11/02/2017    Noted in lab work 2013 as a part of work up at [facility]    • Rheumatoid arthritis involving multiple sites with positive rheumatoid factor (HCC) " 03/14/2016    Dr. Escoto   • Rheumatoid nodule (HCC) 07/25/2017   • Right bundle branch block 06/27/2012    pt denies    • Urinary incontinence 11/02/2017     Past Surgical History:   Procedure Laterality Date   • IA REMOVE ARMPITS LYMPH NODES COMPLT Left 6/7/2022    Procedure: LYMPHADENECTOMY, AXILLARY;  Surgeon: Bernardino Torres M.D.;  Location: SURGERY SAME DAY [facility];  Service: General   • BLOCK EPIDURAL STEROID INJECTION Right 5/31/2022    Procedure: RIGHT L4-5 and L5-S1 transforaminal epidural steroid injection with fluoroscopic guidance.;  Surgeon: Indira Lee M.D.;  Location: SURGERY [facility];  Service: Pain Management   • BLOCK EPIDURAL STEROID INJECTION Right 05/10/2022    Procedure: Lumbar L4-5 interlaminar epidural steroid injection;  Surgeon: Indira Lee M.D.;  Location: SURGERY REH[facility] PAIN [facility]NA[facility];  Service: Pain Management   • WIDE EXCISION, LESION, HEAD AND NECK REGION Left 03/29/2022    Procedure: WIDE EXCISION, LESION, HEAD AND NECK REGION - RADICAL MALIGNANT MELANOMA OF LEFT CHEST;  Surgeon: Bernardino Torres M.D.;  Location: SURGERY SAME DAY [facility];  Service: General   • LUMBAR MEDIAL BRANCH BLOCKS Bilateral 12/15/2020    Procedure: BLOCK, NERVE, SPINAL, LUMBAR, POSTERIOR RAMUS, MEDIAL BRANCH;  Surgeon: Chris Cooper M.D.;  Location: SURGERY [facility]IN [facility]NA[facility];  Service: Pain Management   • IRRIGATION & DEBRIDEMENT ORTHO Bilateral 07/31/2020    Procedure: IRRIGATION AND DEBRIDEMENT, WOUND - KNEE;  Surgeon: Roosevelt Saucedo M.D.;  Location: SURGERY [facility];  Service: Orthopedics   • HEMICOLECTOMY Right 12/13/2018    Procedure: OPEN RIGHT HEMICOLECTOMY;  Surgeon: Dash Bhagat M.D.;  Location: SURGERY [facility];  Service: General   • INGUINAL HERNIA REPAIR Right 09/23/2016    Procedure: INGUINAL HERNIA REPAIR - PRIMARY;  Surgeon: Mary Medina M.D.;  Location: [facility];  Service:    • OTHER  08/12/2014    peroneal nerve surgery  Dr. Castro    • OTHER ORTHOPEDIC SURGERY  2011    meniscus repair   • ARTHROPLASTY Left     hip replacement   • CHOLECYSTECTOMY     • HYSTERECTOMY, TOTAL ABDOMINAL  1970's   • KNEE ARTHROPLASTY TOTAL Left    • ROTATOR CUFF REPAIR Bilateral      Allergies   Allergen Reactions   • Wound Dressing Adhesive      Pt says band-aids cause skin reaction/rash if on for more than 2 days  Paper tape OK       Outpatient Encounter Medications as of 6/29/2022   Medication Sig Dispense Refill   • atorvastatin (LIPITOR) 10 MG Tab Take 1 Tablet by mouth every evening. 90 Tablet 1   • gabapentin (NEURONTIN) 100 MG Cap Take 4 Capsules by mouth at bedtime. 120 Capsule 2   • Multiple Vitamins-Minerals (PRESERVISION AREDS 2 PO) every day.     • Calcium Carbonate (CALCIUM 600 PO) Take 300 mg by mouth.     • SODIUM FLUORIDE 5000 PLUS 1.1 % Cream BRUSH WITH PEA SIZED AMOUNT EVERY DAY PREFERABLY BEFORE BEDTIME. SPIT OUT ALL EXCESS. DO NOT RINSE     • omeprazole (PRILOSEC) 20 MG delayed-release capsule Take 1 Capsule by mouth every day. 90 Capsule 3   • lidocaine (LIDODERM) 5 % Patch APPLY ONE PATCH TO CLEAN DRY SKIN AS DIRECTED DAILY 90 Patch 1   • denosumab (PROLIA) 60 MG/ML Solution Prefilled Syringe injection Inject 60 mg under the skin every 6 months.     • Polyethylene Glycol 3350 (MIRALAX PO) Take  by mouth every day at 6 PM. 1 teaspoon daily     • rivaroxaban (XARELTO) 20 MG Tab tablet Take 1 Tablet by mouth with dinner. 90 Tablet 1   • diphenhydrAMINE-APAP, sleep,  MG Tab Take 2 Tablets by mouth at bedtime.     • acetaminophen (TYLENOL) 500 MG Tab Take 500 mg by mouth 2 times a day.     • vitamin D (CHOLECALCIFEROL) 1000 UNIT Tab Take 1,000 Units by mouth every morning.     • [DISCONTINUED] Magnesium 400 MG Cap every day. (Patient not taking: No sig reported)     • [DISCONTINUED] B Complex Vitamins (B COMPLEX B-12 PO) Take  by mouth. (Patient not taking: No sig reported)     • [DISCONTINUED] Potassium 99 MG Tab Take 99 mg by  mouth every day. (Patient not taking: No sig reported)       No facility-administered encounter medications on file as of 6/29/2022.     Social History     Socioeconomic History   • Marital status:      Spouse name: Not on file   • Number of children: Not on file   • Years of education: Not on file   • Highest education level: Not on file   Occupational History   • Not on file   Tobacco Use   • Smoking status: Never Smoker   • Smokeless tobacco: Never Used   Vaping Use   • Vaping Use: Never used   Substance and Sexual Activity   • Alcohol use: Yes     Comment: very rare   • Drug use: Not Currently   • Sexual activity: Not Currently     Partners: Male     Comment:     Other Topics Concern   • Not on file   Social History Narrative    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      Social Determinants of Health     Financial Resource Strain: Not on file   Food Insecurity: Not on file   Transportation Needs: Not on file   Physical Activity: Not on file   Stress: Not on file   Social Connections: Not on file   Intimate Partner Violence: Not on file   Housing Stability: Not on file     Family History   Problem Relation Age of Onset   • Cancer Mother         stomach   • Cancer Father         colon   • Leukemia Father         many exposure, worked for [employer]    • Heart Disease Brother         s/p stent          Studies  Lab Results   Component Value Date/Time    CHOLSTRLTOT 147 09/28/2021 09:20 AM    LDL 78 09/28/2021 09:20 AM    HDL 51 09/28/2021 09:20 AM    TRIGLYCERIDE 92 09/28/2021 09:20 AM       Lab Results   Component Value Date/Time    SODIUM 133 (L) 06/01/2022 11:55 AM    POTASSIUM 4.2 06/01/2022 11:55 AM    CHLORIDE 97 06/01/2022 11:55 AM    CO2 24 06/01/2022 11:55 AM    GLUCOSE 98 06/01/2022 11:55 AM    BUN 26 (H) 06/01/2022 11:55 AM    CREATININE 0.77 06/01/2022 11:55 AM     Lab Results   Component Value Date/Time    ALKPHOSPHAT 47 06/01/2022 11:55 AM    ASTSGOT 17  06/01/2022 11:55 AM    ALTSGPT 21 06/01/2022 11:55 AM    TBILIRUBIN 0.6 06/01/2022 11:55 AM        For this encounter I reviewed the following medical records prior cardiology notes, BMP and CBC     For this encounter I directly reviewed ECG tracings and Echo images.  Right bundle branch block and left anterior fascicular block.  Echocardiogram shows apparent preservation of valve area by planimeter but with moderate gradients.. I otherwise agree with the interpretation in the EHR.

## 2022-06-30 ENCOUNTER — OFFICE VISIT (OUTPATIENT)
Dept: PHYSICAL MEDICINE AND REHAB | Facility: MEDICAL CENTER | Age: 87
End: 2022-06-30
Payer: MEDICARE

## 2022-06-30 VITALS
OXYGEN SATURATION: 96 % | DIASTOLIC BLOOD PRESSURE: 80 MMHG | BODY MASS INDEX: 28.09 KG/M2 | HEART RATE: 76 BPM | WEIGHT: 158.51 LBS | HEIGHT: 63 IN | SYSTOLIC BLOOD PRESSURE: 128 MMHG | TEMPERATURE: 97.9 F

## 2022-06-30 DIAGNOSIS — M54.16 LUMBAR RADICULITIS: ICD-10-CM

## 2022-06-30 DIAGNOSIS — G89.29 CHRONIC BILATERAL LOW BACK PAIN WITH RIGHT-SIDED SCIATICA: ICD-10-CM

## 2022-06-30 DIAGNOSIS — M47.816 LUMBAR SPONDYLOSIS: ICD-10-CM

## 2022-06-30 DIAGNOSIS — M53.3 SACROILIAC JOINT DYSFUNCTION OF RIGHT SIDE: Primary | ICD-10-CM

## 2022-06-30 DIAGNOSIS — R20.2 NUMBNESS AND TINGLING OF RIGHT LEG: ICD-10-CM

## 2022-06-30 DIAGNOSIS — R20.0 NUMBNESS AND TINGLING OF RIGHT LEG: ICD-10-CM

## 2022-06-30 DIAGNOSIS — M48.061 SPINAL STENOSIS OF LUMBAR REGION, UNSPECIFIED WHETHER NEUROGENIC CLAUDICATION PRESENT: ICD-10-CM

## 2022-06-30 DIAGNOSIS — M54.41 CHRONIC BILATERAL LOW BACK PAIN WITH RIGHT-SIDED SCIATICA: ICD-10-CM

## 2022-06-30 DIAGNOSIS — M05.79 RHEUMATOID ARTHRITIS INVOLVING MULTIPLE SITES WITH POSITIVE RHEUMATOID FACTOR (HCC): ICD-10-CM

## 2022-06-30 DIAGNOSIS — G62.9 NEUROPATHY: ICD-10-CM

## 2022-06-30 PROCEDURE — 99214 OFFICE O/P EST MOD 30 MIN: CPT | Performed by: STUDENT IN AN ORGANIZED HEALTH CARE EDUCATION/TRAINING PROGRAM

## 2022-06-30 RX ORDER — GABAPENTIN 300 MG/1
600 CAPSULE ORAL
Qty: 180 CAPSULE | Refills: 2 | Status: ON HOLD | OUTPATIENT
Start: 2022-06-30 | End: 2023-01-09

## 2022-06-30 ASSESSMENT — PAIN SCALES - GENERAL: PAINLEVEL: 7=MODERATE-SEVERE PAIN

## 2022-06-30 ASSESSMENT — PATIENT HEALTH QUESTIONNAIRE - PHQ9
CLINICAL INTERPRETATION OF PHQ2 SCORE: 1
SUM OF ALL RESPONSES TO PHQ QUESTIONS 1-9: 3
5. POOR APPETITE OR OVEREATING: 0 - NOT AT ALL

## 2022-06-30 ASSESSMENT — FIBROSIS 4 INDEX: FIB4 SCORE: 1.54

## 2022-07-06 ENCOUNTER — OFFICE VISIT (OUTPATIENT)
Dept: HEMATOLOGY ONCOLOGY | Facility: MEDICAL CENTER | Age: 87
End: 2022-07-06
Payer: MEDICARE

## 2022-07-06 VITALS
OXYGEN SATURATION: 96 % | HEIGHT: 63 IN | SYSTOLIC BLOOD PRESSURE: 122 MMHG | DIASTOLIC BLOOD PRESSURE: 74 MMHG | RESPIRATION RATE: 16 BRPM | TEMPERATURE: 97.9 F | BODY MASS INDEX: 27.82 KG/M2 | HEART RATE: 75 BPM | WEIGHT: 157 LBS

## 2022-07-06 DIAGNOSIS — C43.59 MALIGNANT MELANOMA OF CHEST WALL (HCC): ICD-10-CM

## 2022-07-06 DIAGNOSIS — M05.79 RHEUMATOID ARTHRITIS INVOLVING MULTIPLE SITES WITH POSITIVE RHEUMATOID FACTOR (HCC): Chronic | ICD-10-CM

## 2022-07-06 PROCEDURE — 99205 OFFICE O/P NEW HI 60 MIN: CPT | Performed by: STUDENT IN AN ORGANIZED HEALTH CARE EDUCATION/TRAINING PROGRAM

## 2022-07-06 ASSESSMENT — PAIN SCALES - GENERAL: PAINLEVEL: 5=MODERATE PAIN

## 2022-07-06 ASSESSMENT — FIBROSIS 4 INDEX: FIB4 SCORE: 1.54

## 2022-07-07 ENCOUNTER — PATIENT OUTREACH (OUTPATIENT)
Dept: HEALTH INFORMATION MANAGEMENT | Facility: OTHER | Age: 87
End: 2022-07-07
Payer: MEDICARE

## 2022-07-07 ASSESSMENT — ENCOUNTER SYMPTOMS
NAUSEA: 0
HEADACHES: 0
BLURRED VISION: 0
CHILLS: 0
ABDOMINAL PAIN: 0
PALPITATIONS: 0
SORE THROAT: 0
DOUBLE VISION: 0
WEAKNESS: 0
DIAPHORESIS: 0
WHEEZING: 0
DIARRHEA: 0
BRUISES/BLEEDS EASILY: 0
CONSTIPATION: 0
BACK PAIN: 1
WEIGHT LOSS: 0
TINGLING: 1
SINUS PAIN: 0
SHORTNESS OF BREATH: 0
NERVOUS/ANXIOUS: 0
MYALGIAS: 0
DIZZINESS: 0
INSOMNIA: 0
COUGH: 0
SPUTUM PRODUCTION: 0
BLOOD IN STOOL: 0
HEARTBURN: 0
ORTHOPNEA: 0
VOMITING: 0
FEVER: 0

## 2022-07-07 NOTE — PROGRESS NOTES
"Consult:  Hematology/Oncology      Referring Physician: PCP  Primary Care:  CRISTINO Horner    Diagnosis: Cutaneous melanoma, [id number] stage III disease    Chief Complaint:  Evaluation for systemic therapy    History of Presenting Illness:  Marry Mathur is a 86 y.o.  woman who presents to the clinic for evaluation for systemic therapy for a melanoma of the left chest wall with depth of 6.5 mm and spread of disease to the axilla with 4 lymph nodes positive out of 20 examined.     She first noted the lesion on her chest wall in February of 2022, having had previous melanoma on the leg that was excised. She did not immediately go for surgical resection due to delays related to COVID, and eventually went and had it excised in April. It was noted to be very locally aggressive, and she subsequently had a PET scan on 05/05/22 which revealed disease in three axillary lymph nodes. Therefore, she went back for jen dissection of the the left axilla. She had this performed, which revealed 4 positive lymph nodes, confirming stage III disease. She was recommended for adjuvant immunotherapy with nivolumab by outside oncologist Dr. Tatianna Easley, and wanted a second opinion regarding therapy. She also has a history of rheumatoid arthritis, well-controlled with hydroxychloroquine, without any reported flares since her diagnosis.     Interval History:  Patient is here for consultation. She has been dealing with lymphedema in her left upper extremity and is working with occupational therapy. She feels okay otherwise, but is unsure about rushing into immunotherapy and wants to discuss things further.     Past Medical History:   Diagnosis Date   • Adenocarcinoma of colon (HCC) 10/30/2018    From sessile serrated polyp 10/2018   • Anesthesia     pt states \"one new dr scraped my esophagus when they put the tube in and I started bleeding so they couldn't operate\"    • Atrial fibrillation [I48.91] " 04/19/2016     pt denies    • Back pain 06/01/2022    0/10   • Blood clotting disorder (HCC) 2012    clot in leg   • Bowel habit changes     constipation    • Cancer (HCC)     skin, colon 2018   • Cataract     belia IOL    • Chronic back pain greater than 3 months duration    • Deep vein thrombosis (HCC) 03/08/2016    First occurrence in LLE in late 1970s Second occurrence further up in LLE in 2012, has been on AC since    • Essential hypertension, benign 06/27/2012   • GERD (gastroesophageal reflux disease) 06/27/2012   • Heart murmur    • Hyperlipidemia    • Hypertension     hx of, not currently    • Impaired fasting glucose 11/02/2017   • Melanoma (HCC)    • Mild aortic stenosis     Seeing Dr. Van   • Other and unspecified hyperlipidemia 06/27/2012   • Prediabetes    • Protein S deficiency (HCC) 11/02/2017    Noted in lab work 2013 as a part of work up at [facility]    • Rheumatoid arthritis involving multiple sites with positive rheumatoid factor (HCC) 03/14/2016    Dr. Escoto   • Rheumatoid nodule (HCC) 07/25/2017   • Right bundle branch block 06/27/2012    pt denies    • Urinary incontinence 11/02/2017       Past Surgical History:   Procedure Laterality Date   • OR REMOVE ARMPITS LYMPH NODES COMPLT Left 6/7/2022    Procedure: LYMPHADENECTOMY, AXILLARY;  Surgeon: Bernardino Torres M.D.;  Location: SURGERY SAME DAY [facility];  Service: General   • BLOCK EPIDURAL STEROID INJECTION Right 5/31/2022    Procedure: RIGHT L4-5 and L5-S1 transforaminal epidural steroid injection with fluoroscopic guidance.;  Surgeon: Indira Lee M.D.;  Location: SURGERY [facility];  Service: Pain Management   • BLOCK EPIDURAL STEROID INJECTION Right 05/10/2022    Procedure: Lumbar L4-5 interlaminar epidural steroid injection;  Surgeon: Indira Lee M.D.;  Location: SURGERY REHAB PAIN MANAGEMENT;  Service: Pain Management   • WIDE EXCISION, LESION, HEAD AND NECK REGION Left 03/29/2022    Procedure: WIDE EXCISION,  LESION, HEAD AND NECK REGION - RADICAL MALIGNANT MELANOMA OF LEFT CHEST;  Surgeon: Bernardino Torres M.D.;  Location: SURGERY SAME DAY [facility];  Service: General   • LUMBAR MEDIAL BRANCH BLOCKS Bilateral 12/15/2020    Procedure: BLOCK, NERVE, SPINAL, LUMBAR, POSTERIOR RAMUS, MEDIAL BRANCH;  Surgeon: Chris Cooper M.D.;  Location: SURGERY [facility]HAB PAIN MANAGEMENT;  Service: Pain Management   • IRRIGATION & DEBRIDEMENT ORTHO Bilateral 07/31/2020    Procedure: IRRIGATION AND DEBRIDEMENT, WOUND - KNEE;  Surgeon: Roosevelt Saucedo M.D.;  Location: SURGERY [facility];  Service: Orthopedics   • HEMICOLECTOMY Right 12/13/2018    Procedure: OPEN RIGHT HEMICOLECTOMY;  Surgeon: Dash Bhagat M.D.;  Location: SURGERY [facility];  Service: General   • INGUINAL HERNIA REPAIR Right 09/23/2016    Procedure: INGUINAL HERNIA REPAIR - PRIMARY;  Surgeon: Mary Medina M.D.;  Location: SURGERY [facility];  Service:    • OTHER  08/12/2014    peroneal nerve surgery Dr. Castro    • OTHER ORTHOPEDIC SURGERY  2011    meniscus repair   • ARTHROPLASTY Left     hip replacement   • CHOLECYSTECTOMY     • HYSTERECTOMY, TOTAL ABDOMINAL  1970's   • KNEE ARTHROPLASTY TOTAL Left    • ROTATOR CUFF REPAIR Bilateral        Social History     Socioeconomic History   • Marital status:      Spouse name: Not on file   • Number of children: Not on file   • Years of education: Not on file   • Highest education level: Not on file   Occupational History   • Not on file   Tobacco Use   • Smoking status: Never Smoker   • Smokeless tobacco: Never Used   Vaping Use   • Vaping Use: Never used   Substance and Sexual Activity   • Alcohol use: Yes     Comment: very rare   • Drug use: Not Currently   • Sexual activity: Not Currently     Partners: Male     Comment:     Other Topics Concern   • Not on file   Social History Narrative    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      Social Determinants of  Health     Financial Resource Strain: Not on file   Food Insecurity: Not on file   Transportation Needs: Not on file   Physical Activity: Not on file   Stress: Not on file   Social Connections: Not on file   Intimate Partner Violence: Not on file   Housing Stability: Not on file       Family History   Problem Relation Age of Onset   • Cancer Mother         stomach   • Cancer Father         colon   • Leukemia Father         many exposure, worked for [employer]    • Heart Disease Brother         s/p stent        OB History    Para Term  AB Living   1 1 1         SAB IAB Ectopic Molar Multiple Live Births                    # Outcome Date GA Lbr Freddy/2nd Weight Sex Delivery Anes PTL Lv   1 Term                Allergies as of 2022 - Reviewed 2022   Allergen Reaction Noted   • Wound dressing adhesive  2022         Current Outpatient Medications:   •  gabapentin (NEURONTIN) 300 MG Cap, Take 2 Capsules by mouth at bedtime., Disp: 180 Capsule, Rfl: 2  •  atorvastatin (LIPITOR) 10 MG Tab, Take 1 Tablet by mouth every evening., Disp: 90 Tablet, Rfl: 1  •  Multiple Vitamins-Minerals (PRESERVISION AREDS 2 PO), every day., Disp: , Rfl:   •  Calcium Carbonate (CALCIUM 600 PO), Take 300 mg by mouth., Disp: , Rfl:   •  SODIUM FLUORIDE 5000 PLUS 1.1 % Cream, BRUSH WITH PEA SIZED AMOUNT EVERY DAY PREFERABLY BEFORE BEDTIME. SPIT OUT ALL EXCESS. DO NOT RINSE, Disp: , Rfl:   •  omeprazole (PRILOSEC) 20 MG delayed-release capsule, Take 1 Capsule by mouth every day., Disp: 90 Capsule, Rfl: 3  •  lidocaine (LIDODERM) 5 % Patch, APPLY ONE PATCH TO CLEAN DRY SKIN AS DIRECTED DAILY, Disp: 90 Patch, Rfl: 1  •  denosumab (PROLIA) 60 MG/ML Solution Prefilled Syringe injection, Inject 60 mg under the skin every 6 months., Disp: , Rfl:   •  Polyethylene Glycol 3350 (MIRALAX PO), Take  by mouth every day at 6 PM. 1 teaspoon daily, Disp: , Rfl:   •  rivaroxaban (XARELTO) 20 MG Tab tablet, Take 1 Tablet by mouth with  "dinner., Disp: 90 Tablet, Rfl: 1  •  diphenhydrAMINE-APAP, sleep,  MG Tab, Take 2 Tablets by mouth at bedtime., Disp: , Rfl:   •  acetaminophen (TYLENOL) 500 MG Tab, Take 500 mg by mouth 2 times a day., Disp: , Rfl:   •  vitamin D (CHOLECALCIFEROL) 1000 UNIT Tab, Take 1,000 Units by mouth every morning., Disp: , Rfl:     Review of Systems:  Review of Systems   Constitutional: Positive for malaise/fatigue. Negative for chills, diaphoresis, fever and weight loss.   HENT: Negative for hearing loss, nosebleeds, sinus pain and sore throat.    Eyes: Negative for blurred vision and double vision.   Respiratory: Negative for cough, sputum production, shortness of breath and wheezing.    Cardiovascular: Negative for chest pain, palpitations, orthopnea and leg swelling.   Gastrointestinal: Negative for abdominal pain, blood in stool, constipation, diarrhea, heartburn, melena, nausea and vomiting.   Genitourinary: Negative for dysuria, frequency, hematuria and urgency.   Musculoskeletal: Positive for back pain and joint pain (Occasional, mostly in feet). Negative for myalgias.   Skin: Negative for rash.   Neurological: Positive for tingling (With associated numbness in feet periodically). Negative for dizziness, weakness and headaches.   Endo/Heme/Allergies: Does not bruise/bleed easily.   Psychiatric/Behavioral: The patient is not nervous/anxious and does not have insomnia.           Physical Exam:  Vitals:    07/06/22 1551   BP: 122/74   BP Location: Right arm   Patient Position: Sitting   BP Cuff Size: Adult   Pulse: 75   Resp: 16   Temp: 36.6 °C (97.9 °F)   TempSrc: Temporal   SpO2: 96%   Weight: 71.2 kg (157 lb)   Height: 1.6 m (5' 2.99\")       DESC; KARNOFSKY SCALE WITH ECOG EQUIVALENT: 90, Able to carry on normal activity; minor signs or symptoms of disease (ECOG equivalent 0)    DISTRESS LEVEL: no apparent distress    Physical Exam  Vitals and nursing note reviewed.   Constitutional:       General: She is awake. " She is not in acute distress.     Appearance: Normal appearance. She is normal weight. She is not ill-appearing, toxic-appearing or diaphoretic.   HENT:      Head: Normocephalic and atraumatic.      Nose: Nose normal. No congestion.      Mouth/Throat:      Pharynx: Oropharynx is clear. No oropharyngeal exudate or posterior oropharyngeal erythema.   Eyes:      General: No scleral icterus.     Extraocular Movements: Extraocular movements intact.      Conjunctiva/sclera: Conjunctivae normal.      Pupils: Pupils are equal, round, and reactive to light.   Cardiovascular:      Rate and Rhythm: Normal rate and regular rhythm.      Pulses: Normal pulses.      Heart sounds: Murmur heard.    Systolic murmur is present with a grade of 4/6.    No friction rub. No gallop.   Pulmonary:      Effort: Pulmonary effort is normal.      Breath sounds: Normal breath sounds. No decreased air movement. No wheezing, rhonchi or rales.   Chest:   Breasts:      Right: No axillary adenopathy.      Left: No axillary adenopathy.        Comments: Chest wall scar from surgery  Abdominal:      General: Bowel sounds are normal. There is no distension.      Tenderness: There is no abdominal tenderness.   Musculoskeletal:         General: Deformity (DIP and PIP joints on hands) present. Normal range of motion.      Cervical back: Normal range of motion and neck supple. No tenderness.      Right lower leg: No edema.      Left lower leg: No edema.   Lymphadenopathy:      Cervical: No cervical adenopathy.      Upper Body:      Right upper body: No axillary adenopathy.      Left upper body: No axillary adenopathy.      Lower Body: No right inguinal adenopathy. No left inguinal adenopathy.   Skin:     General: Skin is warm and dry.      Coloration: Skin is not jaundiced.      Findings: No erythema or rash.   Neurological:      General: No focal deficit present.      Mental Status: She is alert and oriented to person, place, and time.      Cranial Nerves:  Cranial nerves are intact.      Sensory: Sensation is intact.      Motor: Motor function is intact. No weakness.      Gait: Gait is intact.   Psychiatric:         Attention and Perception: Attention normal.         Mood and Affect: Mood normal.         Behavior: Behavior normal. Behavior is cooperative.         Thought Content: Thought content normal.         Judgment: Judgment normal.          Depression Screening    Little interest or pleasure in doing things?      Feeling down, depressed , or hopeless?     Trouble falling or staying asleep, or sleeping too much?      Feeling tired or having little energy?      Poor appetite or overeating?      Feeling bad about yourself - or that you are a failure or have let yourself or your family down?     Trouble concentrating on things, such as reading the newspaper or watching television?     Moving or speaking so slowly that other people could have noticed.  Or the opposite - being so fidgety or restless that you have been moving around a lot more than usual?      Thoughts that you would be better off dead, or of hurting yourself?      Patient Health Questionnaire Score:         If depressive symptoms identified deferred to follow up visit unless specifically addressed in assesment and plan.    Interpretation of PHQ-9 Total Score   Score Severity   1-4 No Depression   5-9 Mild Depression   10-14 Moderate Depression   15-19 Moderately Severe Depression   20-27 Severe Depression    Labs:  No visits with results within 7 Day(s) from this visit.   Latest known visit with results is:   Outpatient [facility]fusion Services on 06/23/2022   Component Date Value Ref Range Status   • Istat Creatinine 06/23/2022 0.8  0.5 - 1.4 mg/dL Final   • Istat Ionized Calcium 06/23/2022 1.14  1.10 - 1.30 mmol/L Final       Imaging:   All listed images below have been independently reviewed by me. I agree with the findings as summarized below:    Whole body PET/CT scan reported to have shown left  axillary PET-avid lymph nodes with no other reported sites of disease    Pathology:    FINAL DIAGNOSIS:     A. Left axillary lymph nodes:          Level II-three out of nine lymph nodes positive for metastatic           melanoma (3/9).           Largest metastatic focus measures 3.2 cm in greatest dimension.           Focal extranodal extension present.          Level III- one out of eleven lymph nodes positive for metastatic           melanoma (1/11).          Metastatic focus measures 1.8 cm in greatest dimension.                                         Diagnosis performed by:                                       DAKSHA HUGHES MD     FINAL DIAGNOSIS:     A. Left upper chest melanoma:          Invasive nodular melanoma measuring at least 2.4 cm based upon           its presence in six contiguous sections.          The melanoma invades to a depth of 6.5mm.          All margins free of involvement.     Synoptic Report for Melanoma of the Skin:          SPECIMEN        -Procedure: Excision.        -Specimen Laterality: Left.        TUMOR        -Tumor Site: Upper chest        -Multiple Primary Sites: Not applicable        -Invasiveness: Invasive melanoma.        -Histologic Type: Nodular melanoma.        -Maximum Tumor Thickness: 6.5mm        -Macroscopic satellite nodule(s): Not identified.        -Ulceration: Not identified.        -Mitotic rate: 9 mitoses / mm2        -Microsatellites: Not identified.        -Lymphovascular Invasion: Not identified.        -Neurotropism: Not identified.        -Tumor Regression: Not identified.        MARGINS        -Margins:         Peripheral Margins: All margins negative.          Distance of invasive melanoma from closest peripheral margin:           8mm from medial, 8.5mm from lateral (all margins are measured           in a fixed and contracted state).          Distance of melanoma in situ from closest peripheral margin: Not           applicable, no in situ component  present.         Deep margin: Negative for invasive melanoma.          Distance of invasive melanoma from margin: 7mm        REGIONAL LYMPH NODES        -Regional Lymph Node Status: Not applicable (no regional lymph         nodes submitted or found).        DISTANT METASTASIS        -Distant Site(s) Involved: Not applicable.        PATHOLOGIC STAGE CLASSIFICATION         -Primary Tumor: pT4a         -Regional Lymph Nodes: pNX         -Distant Metastasis: Not applicable.         ADDITIONAL FINDINGS         -Additional Findings: None.         -Special Studies: None.     Comment: Representative sections of the tumor are also reviewed with   Dr. Crow who concurs.                                         Diagnosis performed by:                                       SHRUTI THOMAS MD     Assessment & Plan:  1. Malignant melanoma of chest wall (HCC)     2. Rheumatoid arthritis involving multiple sites with positive rheumatoid factor (HCC)         This is an 86 year old  woman with cutaneous melanoma of the left chest wall with metastasis to the left axilla with 4/20 lymph nodes positive for disease, [id number] stage III disease. She also has rheumatoid arthritis, well-controlled with hydroxychloroquine, with no reported flares.     Current Diagnosis and Staging: Cutaneous melanoma, [id number] stage III disease, BRAF mutational status not known at this time.     Treatment Plan: Nivolumab 480 mg IV q 4 weeks for 1 year of therapy    Treatment Citation: Checkmate 238, [facility] 2017    Plan of Care:    1. Primary Therapy: Nivolumab  2. Supportive Therapy: Patient has reported history of protein S deficiency, currently on rivaroxaban (prior DVT history); this will need to be verified.   3. Labs: CBC with diff, CMP, TSH, Free T4 with each cycle of therapy  4. Imaging: Obtain copy of recent PET for confirmation of findings  5. Treatment Planning: Agree with assessment from Dr. Easley. Patient does not have clinically significant  RA and therefore benefits outweigh risks regarding immunotherapy in the adjuvant setting. Therefore, would proceed with nivolumab with careful monitoring for flare of RA. If the patient has a BRAF V600E mutation, she could also be a candidate for adjuvant dabrafenib and trametinib for 1 year if intolerant of nivolumab.   6. Consultations: Rheumatology (Dr. Escoto), surgical oncology (Dr. Torres), hematology/oncology (Dr. Easley).   7. Code Status: Full  8. Miscellaneous: NA  9. Return for Follow Up: PRN (if patient decides to follow with us for treatment, we will see her and start her on nivolumab accordingly)    Any questions and concerns raised by the patient were answered to the best of my ability. Thank you for allowing me to participate in the care for this patient. Please feel free to contact me for any questions or concerns.     Total time spent on chart review, clinic encounter, and documentation: 65 minutes.

## 2022-07-11 ENCOUNTER — PATIENT OUTREACH (OUTPATIENT)
Dept: OTHER | Facility: MEDICAL CENTER | Age: 87
End: 2022-07-11
Payer: MEDICARE

## 2022-07-11 ENCOUNTER — TELEPHONE (OUTPATIENT)
Dept: PHYSICAL MEDICINE AND REHAB | Facility: MEDICAL CENTER | Age: 87
End: 2022-07-11
Payer: MEDICARE

## 2022-07-11 NOTE — PROGRESS NOTES
Available for cancer nurse navigation if patient chooses to get cancer treatment at [facility] or pursue treatment with [facility] oncology.  Introduction letter and message sent via [email or web address]

## 2022-07-11 NOTE — LETTER
[facility]   [address]  [place] [address]  Phone: [phone] - Fax: [phone]                 Date: 07/11/22    Dear Marry,    I am a Cancer Nurse Navigator, a certified oncology nurse. My role is to assess any needs you may have with education, guidance and support. I am available to you and your family,should you chose to pursue your cancer treatment at [facility]  .       I am available to address your needs during your journey with the following services:     Care Coordination  I can assist you in facilitating communication between your cancer care treatment team to ensure timely treatment and follow-up.  I can also assist with transition of care back to your primary care provider, or other specialist, as needed.  My goal is to bridge gaps for you throughout the course of your active treatment.       Education Services  Understanding the recommended treatment course by your physician is key. I can provide educational resources personalized to your cancer diagnosis to help you understand your diagnosis and treatment. Please let me know if you would like to receive information about your diagnosis and treatment plan.  I am here to help.     Support Services/Resource Information  [facility] we offer a full scope of support services.  I can assist you with referral information to:  · Cancer Clinical Trials & Research  · Nutrition counseling  · Support groups  · Complementary Therapies such as Mind-Body Techniques Meditation  · Patient Financial Advocates  ·   · [facility], an [employer] affiliate office, our volunteers can assist you with accessing our lending library, support services information, head coverings and comfort items  · Community and national resources, included eligibility based hermelinda assistance and pharmaceutical access programs, if you are in need of additional information.     Critical access hospital offers  services that include:  · Behavioral Health  · Genetic counseling & testing  · Acupuncture  · Lymphedema prevention/treatment program     I hope you have an excellent patient experience.  Please feel free to share with me your comments regarding the care you have received- we value your feedback.    Sincerely,     Lis Fallon R.N.  Cancer Nurse Navigator    Office: [phone]  Main:  [phone]   Email:  [email or web address]

## 2022-07-12 ENCOUNTER — APPOINTMENT (OUTPATIENT)
Dept: RADIOLOGY | Facility: REHABILITATION | Age: 87
End: 2022-07-12
Attending: STUDENT IN AN ORGANIZED HEALTH CARE EDUCATION/TRAINING PROGRAM
Payer: MEDICARE

## 2022-07-12 ENCOUNTER — HOSPITAL ENCOUNTER (OUTPATIENT)
Facility: REHABILITATION | Age: 87
End: 2022-07-12
Attending: STUDENT IN AN ORGANIZED HEALTH CARE EDUCATION/TRAINING PROGRAM | Admitting: STUDENT IN AN ORGANIZED HEALTH CARE EDUCATION/TRAINING PROGRAM
Payer: MEDICARE

## 2022-07-12 VITALS
OXYGEN SATURATION: 96 % | HEART RATE: 75 BPM | SYSTOLIC BLOOD PRESSURE: 159 MMHG | HEIGHT: 63 IN | RESPIRATION RATE: 16 BRPM | DIASTOLIC BLOOD PRESSURE: 89 MMHG | WEIGHT: 158.73 LBS | BODY MASS INDEX: 28.12 KG/M2 | TEMPERATURE: 98.1 F

## 2022-07-12 PROCEDURE — 700117 HCHG RX CONTRAST REV CODE 255

## 2022-07-12 PROCEDURE — G0260 INJ FOR SACROILIAC JT ANESTH: HCPCS

## 2022-07-12 PROCEDURE — 700111 HCHG RX REV CODE 636 W/ 250 OVERRIDE (IP)

## 2022-07-12 RX ORDER — BUPIVACAINE HYDROCHLORIDE 2.5 MG/ML
INJECTION, SOLUTION EPIDURAL; INFILTRATION; INTRACAUDAL
Status: COMPLETED
Start: 2022-07-12 | End: 2022-07-12

## 2022-07-12 RX ORDER — LIDOCAINE HYDROCHLORIDE 10 MG/ML
INJECTION, SOLUTION EPIDURAL; INFILTRATION; INTRACAUDAL; PERINEURAL
Status: COMPLETED
Start: 2022-07-12 | End: 2022-07-12

## 2022-07-12 RX ORDER — METHYLPREDNISOLONE ACETATE 80 MG/ML
INJECTION, SUSPENSION INTRA-ARTICULAR; INTRALESIONAL; INTRAMUSCULAR; SOFT TISSUE
Status: COMPLETED
Start: 2022-07-12 | End: 2022-07-12

## 2022-07-12 RX ADMIN — IOHEXOL 5 ML: 240 INJECTION, SOLUTION INTRATHECAL; INTRAVASCULAR; INTRAVENOUS; ORAL at 15:00

## 2022-07-12 RX ADMIN — METHYLPREDNISOLONE ACETATE 80 MG: 80 INJECTION, SUSPENSION INTRA-ARTICULAR; INTRALESIONAL; INTRAMUSCULAR; SOFT TISSUE at 14:45

## 2022-07-12 RX ADMIN — BUPIVACAINE HYDROCHLORIDE 10 ML: 2.5 INJECTION, SOLUTION EPIDURAL; INFILTRATION; INTRACAUDAL; PERINEURAL at 14:45

## 2022-07-12 RX ADMIN — LIDOCAINE HYDROCHLORIDE 10 ML: 10 INJECTION, SOLUTION EPIDURAL; INFILTRATION; INTRACAUDAL; PERINEURAL at 14:45

## 2022-07-12 ASSESSMENT — FIBROSIS 4 INDEX: FIB4 SCORE: 1.54

## 2022-07-12 ASSESSMENT — PAIN DESCRIPTION - PAIN TYPE: TYPE: CHRONIC PAIN

## 2022-07-12 NOTE — INTERVAL H&P NOTE
H&P reviewed. The patient was examined and there are no changes to the H&P    Indira Lee MD  Interventional Pain and Spine  Physical Medicine and Rehabilitation  [facility]

## 2022-07-12 NOTE — PROGRESS NOTES
Received report from pre-procedure RN..  Pre-assessment completed.  Blood pressure and pulse ox connected to pt by surg tech and RN.  Pt hx reviewed (HTN, RBB, GERD).  Pt identified and time out performed with team agreeable.

## 2022-07-12 NOTE — PROGRESS NOTES
Pt received to recovery area with report from procedure room RN Jessica.  VSS.  Ice compress applied to the affected area.  No swelling noted, dressing CDI.  Pt tolerates PO fluids and snack without difficulty.  Dr. Lee evaluated patient.  Meets criteria for discharge.  Pt ambulatory without difficulty.  Discharged to designated .

## 2022-07-12 NOTE — PROGRESS NOTES
I made three phone call attempts, will introduce services and complete [facility] intake when pt. Returns call.

## 2022-07-12 NOTE — OP REPORT
Date of Service: 7/12/2022    Patient: Marry Mathur 86 y.o. female     MRN: [id number]     Physician/s: Indira Lee MD    Pre-operative Diagnosis: Sacroiliac dysfunction    Post-operative Diagnosis: Sacroiliac dysfunction    Procedure: RIGHT   Sacroiliac joint injection    Description of procedure:    The risks, benefits, and alternatives of the procedure were reviewed and discussed with the patient.  Written informed consent was freely obtained. A pre-procedural time-out was conducted by the physician verifying patient’s identity, procedure to be performed, procedure site and side, and allergy verification. Appropriate equipment was determined to be in place for the procedure.     In the fluoroscopy suite the patient was placed in a prone position and the skin was prepped and draped in the usual sterile fashion. The fluoroscope was placed over the right  sacroiliac joint at the appropriate angle for joint entrance, and the target for injection was marked. A 27g needle was placed into each of the marked level(s), and approx 2cc of 1% Lidocaine was injected subcutaneously into the epidermal and dermal layers. The needle was removed. A 25g 3.5 inch needle was then placed down to the level of bone at the inferior/distal aspect of the joint. The needle was then retracted and carefully advanced into the joint capsule. The needle tip was then verified by a lateral view. In the AP view, contrast dye was used to highlight the joint line while the fluoroscope was running live. Following negative aspiration, approx 2mL of 0.25% bupivacaine with 1mL of 80 mg/mL methylprednisolone was then injected. The needle was removed intact after restyleted. The patient's low back was covered with a 4x4 gauze, the area was cleansed with sterile normal saline, and a dressing was applied. There were no complications noted.     Follow-up as scheduled    Indira Lee MD  Interventional Pain Management  Physical Medicine and  [facility]  7/12/2022        CPT  sacroiliac joint with fluoroscopy [id number]

## 2022-07-12 NOTE — PROGRESS NOTES
Pt admitted to pre-procedure area.  Procedure and site confirmed, consent signed.  VSS.  Medication allergies and current medications reviewed in Epic.  Designated  waiting in the car.  Printed discharge instructions reviewed and signed.  Pertinent medical information (blood clots, HTN, GERD) reviewed in Epic and communicated to procedure RN.  Pt denies taking any NSAIDS/blood-thinners/anti-coagulants since 7/8, Dr. Lee aware.

## 2022-07-14 ENCOUNTER — HOSPITAL ENCOUNTER (OUTPATIENT)
Dept: LAB | Facility: MEDICAL CENTER | Age: 87
End: 2022-07-14
Attending: INTERNAL MEDICINE
Payer: MEDICARE

## 2022-07-14 ENCOUNTER — TELEPHONE (OUTPATIENT)
Dept: PHYSICAL MEDICINE AND REHAB | Facility: MEDICAL CENTER | Age: 87
End: 2022-07-14

## 2022-07-14 DIAGNOSIS — I35.0 NONRHEUMATIC AORTIC VALVE STENOSIS: ICD-10-CM

## 2022-07-14 LAB — NT-PROBNP SERPL IA-MCNC: 905 PG/ML (ref 0–125)

## 2022-07-14 PROCEDURE — 83880 ASSAY OF NATRIURETIC PEPTIDE: CPT | Mod: GA

## 2022-07-14 PROCEDURE — 36415 COLL VENOUS BLD VENIPUNCTURE: CPT | Mod: GA

## 2022-07-14 NOTE — TELEPHONE ENCOUNTER
Called for post-sp check-up. Pt reported the following regarding the procedure site:    Change in pain?: Day of shot was fine, next day went back to normal pain level. Feeling discouraged.     Iced area?: Yes, helps a little.    Concerns?: No.    Confirmed [facility] appt?: Yes.

## 2022-07-14 NOTE — TELEPHONE ENCOUNTER
Called for post-sp check-up. Pt reported the following regarding the procedure site:     Change in pain?: Day of shot was fine, next day went back to normal pain level. Feeling discouraged. Said she has had three injections and none of them seem to be working.     Iced area?: Yes, helps a little.     Concerns?: No.     Confirmed [facility] appt?: Yes.

## 2022-07-18 ENCOUNTER — HOSPITAL ENCOUNTER (OUTPATIENT)
Facility: MEDICAL CENTER | Age: 87
End: 2022-07-18
Attending: NURSE PRACTITIONER
Payer: MEDICARE

## 2022-07-18 PROCEDURE — 87340 HEPATITIS B SURFACE AG IA: CPT | Mod: GA

## 2022-07-18 PROCEDURE — 86704 HEP B CORE ANTIBODY TOTAL: CPT | Mod: GA

## 2022-07-18 PROCEDURE — 86706 HEP B SURFACE ANTIBODY: CPT | Mod: GA

## 2022-07-19 LAB
HBV CORE AB SERPL QL IA: NONREACTIVE
HBV SURFACE AB SERPL IA-ACNC: <3.5 MIU/ML (ref 0–10)
HBV SURFACE AG SER QL: NORMAL

## 2022-07-22 NOTE — PROGRESS NOTES
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 Briefly, she was previously a patient of Dr. Olivera's prior to his recent residential.  She was planning to establish care long-term with Dr. Rodney, but needed a preoperative evaluation prior to that appointment and saw me.  At that visit, she reported history of recurrent DVTs, most recently in 2010, for which she had been on indefinite anticoagulation.  She was previously on warfarin and was then changed to rivaroxaban with good tolerance.  Although previous records have indicated a diagnosis of atrial fibrillation, she adamantly denied ever being diagnosed with atrial fibrillation.  She also reported a history of moderate aortic stenosis for which she had yearly surveillance echocardiograms performed by Dr. Olivera.    Fortunately, she was able to undergo surgery for malignant melanoma without cardiovascular complications and is now planning to undergo immunotherapy.  She did have a surveillance echocardiogram performed in May 2022, which demonstrated slight progression of her aortic stenosis to moderate-severe with a V-max of 3.8 m/s and a DVI of 0.22.  She was referred to establish care in [facility] and will see Dr. Serrano again in October 2022.    She presents today for routine follow-up.  She reports being frustrated with chronic lower back/leg pain that has been limiting her activity.  She is not sure if she has had increased shortness of breath recently as her leg issues have prevented her from  exercising as much as she would like.  However, she has not had significant chest pain.     Medications and Allergies:     Current Outpatient Medications   Medication Sig Dispense Refill   • gabapentin (NEURONTIN) 300 MG Cap Take 2 Capsules by mouth at bedtime. 180 Capsule 2   • atorvastatin (LIPITOR) 10 MG Tab Take 1 Tablet by mouth every evening. 90 Tablet 1   • Calcium Carbonate (CALCIUM 600 PO) Take 300 mg by mouth.     • SODIUM FLUORIDE 5000 PLUS 1.1 % Cream BRUSH WITH PEA SIZED AMOUNT EVERY DAY PREFERABLY BEFORE BEDTIME. SPIT OUT ALL EXCESS. DO NOT RINSE     • omeprazole (PRILOSEC) 20 MG delayed-release capsule Take 1 Capsule by mouth every day. 90 Capsule 3   • lidocaine (LIDODERM) 5 % Patch APPLY ONE PATCH TO CLEAN DRY SKIN AS DIRECTED DAILY 90 Patch 1   • denosumab (PROLIA) 60 MG/ML Solution Prefilled Syringe injection Inject 60 mg under the skin every 6 months.     • Polyethylene Glycol 3350 (MIRALAX PO) Take  by mouth every day at 6 PM. 1 teaspoon daily     • rivaroxaban (XARELTO) 20 MG Tab tablet Take 1 Tablet by mouth with dinner. 90 Tablet 1   • diphenhydrAMINE-APAP, sleep,  MG Tab Take 2 Tablets by mouth at bedtime.     • acetaminophen (TYLENOL) 500 MG Tab Take 500 mg by mouth 2 times a day.     • vitamin D (CHOLECALCIFEROL) 1000 UNIT Tab Take 1,000 Units by mouth every morning.       No current facility-administered medications for this visit.       Allergies   Allergen Reactions   • Wound Dressing Adhesive      Pt says band-aids cause skin reaction/rash if on for more than 2 days  Paper tape OK          Medical Decision Making:   # Moderate-severe aortic stenosis: Her most recent echocardiogram demonstrated a V-max of 3.8 m/s with a mean gradient of 33 mmHg and a DVI of 0.22.  -Continue follow-up with [facility]    # Hyperlipidemia: She has been on statin therapy for many years.  Her LDL is reasonably controlled for ASCVD risk.  -Continue atorvastatin 10 mg nightly    # History of recurrent  DVT:  -Continue indefinite anticoagulation if tolerated    Follow Up  6 months     Cardiac Studies and Procedures:   Echocardiography  TTE (5/23/2022)  The left ventricular ejection fraction is visually estimated to be 60-  65%.  Indeterminate diastolic function due to significant mitral   calcification.  Normal right ventricular size and systolic function.  Estimated right ventricular systolic pressure is 35 mmHg.  Mildly dilated left atrium.  Moderate mitral annular calcification without significant mitral   stenosis.  Moderate to severe aortic valve stenosis by Doppler assessment,   although visually the aortic valve appears at most moderately stenotic   visually.  Transvalvular gradients are - Peak: 59 mmHg, Mean:  33 mmHg.  Vmax is 3.8 m/s. DVI is 0.22.  Normal inferior vena cava size and inspiratory collapse.    TTE (4/8/2021)  Normal left ventricular systolic function  Estimated ejection fraction 65%  Unable to assess diastolic function due to moderate mitral annular calcification  Normal right ventricular size and systolic function  Unable to estimate RVSP  Severely dilated left atrium  Mild mitral regurgitation  Moderate aortic stenosis, mean gradient 21 mmHg  Normal IVC    TTE (4/17/2020)  Normal left ventricular size and systolic function.  Left ventricular ejection fraction is visually estimated to be 65%.  Mild concentric left ventricular hypertrophy.  Grade I diastolic dysfunction.  Mildly dilated left atrium.  Moderate aortic stenosis, mean gradient 23 mmHg.  Mild mitral regurgitation.    TTE (2/28/2019)  Normal left ventricular systolic function.  Left ventricular ejection fraction is visually estimated to be 60-65%.  Mild concentric left ventricular hypertrophy.  Grade I diastolic dysfunction.  Moderately dilated left atrium.  Moderate aortic stenosis, mean gradient 25 mmHg  Trace aortic insufficiency.  Mild mitral regurgitation.  Mild tricuspid regurgitation.  Estimated right ventricular systolic  "pressure  is 35 mmHg.    Electrophysiology  ECG (3/4/2022)  Normal sinus rhythm, right bundle branch block, probable left atrial enlargement     Vital Signs:   /86 (BP Location: Right arm, Patient Position: Sitting, BP Cuff Size: Adult)   Pulse 70   Resp 12   Ht 1.6 m (5' 3\")   Wt 71.7 kg (158 lb)   SpO2 98%    BP Readings from Last 4 Encounters:   08/01/22 132/86   07/12/22 (!) 159/89   07/06/22 122/74   06/30/22 128/80     Wt Readings from Last 4 Encounters:   08/01/22 71.7 kg (158 lb)   07/12/22 72 kg (158 lb 11.7 oz)   07/06/22 71.2 kg (157 lb)   06/30/22 71.9 kg (158 lb 8.2 oz)     Body mass index is 27.99 kg/m².     Laboratories:   Lipids  Lab Results   Component Value Date/Time    LDL 78 09/28/2021 0920    LDL 73 04/09/2021 0658    LDL 83 10/14/2020 0709     Lab Results   Component Value Date/Time    HDL 51 09/28/2021 0920    HDL 59 04/09/2021 0658    HDL 62 10/14/2020 0709       Lab Results   Component Value Date/Time    TRIGLYCERIDE 92 09/28/2021 0920    TRIGLYCERIDE 53 04/09/2021 0658    TRIGLYCERIDE 85 10/14/2020 0709       Lab Results   Component Value Date/Time    CHOLSTRLTOT 147 09/28/2021 0920    CHOLSTRLTOT 143 04/09/2021 0658    CHOLSTRLTOT 162 10/14/2020 0709     GFR  Lab Results   Component Value Date/Time    IFNOTAFR >60 12/05/2021 1633    IFNOTAFR >60 09/28/2021 0920    IFNOTAFR >60 06/08/2021 1451    IFNOTAFR >60 04/09/2021 0658    IFNOTAFR >60 01/06/2021 1156     Chemistries  Lab Results   Component Value Date/Time    CREATININE 0.74 12/05/2021 1633    CREATININE 0.75 09/28/2021 0920    CREATININE 0.76 06/08/2021 1451    CREATININE 0.71 04/09/2021 0658    CREATININE 0.86 01/06/2021 1156     Lab Results   Component Value Date/Time    BUN 19 12/05/2021 1633    BUN 16 09/28/2021 0920    BUN 20 06/08/2021 1451    BUN 17 04/09/2021 0658    BUN 25 (H) 01/06/2021 1156     Lab Results   Component Value Date/Time    POTASSIUM 4.2 12/05/2021 1633    POTASSIUM 4.3 09/28/2021 0920    POTASSIUM " 4.3 06/08/2021 1451     Lab Results   Component Value Date/Time    SODIUM 138 12/05/2021 1633    SODIUM 143 09/28/2021 0920    SODIUM 137 06/08/2021 1451     Lab Results   Component Value Date/Time    GLUCOSE 93 12/05/2021 1633    GLUCOSE 90 09/28/2021 0920    GLUCOSE 85 06/08/2021 1451     Lab Results   Component Value Date/Time    ASTSGOT 26 12/05/2021 1633    ASTSGOT 19 09/28/2021 0920    ASTSGOT 23 06/08/2021 1451     Lab Results   Component Value Date/Time    ALTSGPT 29 12/05/2021 1633    ALTSGPT 18 09/28/2021 0920    ALTSGPT 27 06/08/2021 1451     Lab Results   Component Value Date/Time    ALKPHOSPHAT 49 12/05/2021 1633    ALKPHOSPHAT 54 09/28/2021 0920    ALKPHOSPHAT 46 06/08/2021 1451     Lab Results   Component Value Date/Time    HBA1C 5.5 05/16/2016 0750     TSH   Date Value Ref Range Status   07/27/2020 3.190 0.380 - 5.330 uIU/mL Final     Comment:     Please note new reference ranges effective 12/14/2017 10:00 AM  Pregnant Females, 1st Trimester  0.050-3.700  Pregnant Females, 2nd Trimester  0.310-4.350  Pregnant Females, 3rd Trimester  0.410-5.180       Blood Counts  Lab Results   Component Value Date/Time    HEMOGLOBIN 14.2 12/05/2021 1633    HEMOGLOBIN 15.0 04/09/2021 0658    HEMOGLOBIN 14.9 10/14/2020 0709     Lab Results   Component Value Date/Time    PLATELETCT 181 12/05/2021 1633    PLATELETCT 141 (L) 04/09/2021 0658    PLATELETCT 177 10/14/2020 0709     Lab Results   Component Value Date/Time    WBC 6.1 12/05/2021 1633    WBC 4.5 (L) 04/09/2021 0658    WBC 5.6 10/14/2020 0709      Physical Examination:   General: Well-appearing, no acute distress  Eyes: Extraocular movements intact, anicteric  Neck: Supple, full range of motion, no jugular venous distension  Pulmonary: Normal respiratory effort, clear to auscultation bilaterally  Cardiovascular: Regular rate and rhythm, 3/6 harsh systolic murmur  Gastrointestinal: Soft, non-tender, non-distended  Extremities: Warm and well perfused, very trace  extremity edema  Neurological: Alert and oriented, no gross focal motor deficits     Past History:   Past Medical History  The patient's past medical history was reviewed.  See HPI and self-reported patient medical history form for pertinent medical history to consultation.    Past Social History  The patient's social history was reviewed.  See HPI self-reported patient medical history form for pertinent social history to consultation.    Past Family History  The patient's family history was reviewed.  See HPI self-reported patient medical history form for pertinent family history to consultation.    Review of Systems  A pertinent cardiac review of systems was performed and was otherwise unremarkable except as per HPI and self-reported patient medical history form.        Darron Van MD, [facility]  Interventional Cardiology  [facility], [address]  [address] [address]  Phone: [phone]  Fax: [phone]

## 2022-08-01 ENCOUNTER — OFFICE VISIT (OUTPATIENT)
Dept: CARDIOLOGY | Facility: MEDICAL CENTER | Age: 87
End: 2022-08-01
Payer: MEDICARE

## 2022-08-01 VITALS
SYSTOLIC BLOOD PRESSURE: 132 MMHG | WEIGHT: 158 LBS | DIASTOLIC BLOOD PRESSURE: 86 MMHG | HEART RATE: 70 BPM | OXYGEN SATURATION: 98 % | BODY MASS INDEX: 28 KG/M2 | HEIGHT: 63 IN | RESPIRATION RATE: 12 BRPM

## 2022-08-01 DIAGNOSIS — I82.5Y2 CHRONIC DEEP VEIN THROMBOSIS (DVT) OF PROXIMAL VEIN OF LEFT LOWER EXTREMITY (HCC): ICD-10-CM

## 2022-08-01 DIAGNOSIS — E78.5 HYPERLIPIDEMIA, UNSPECIFIED HYPERLIPIDEMIA TYPE: Chronic | ICD-10-CM

## 2022-08-01 DIAGNOSIS — I35.0 NONRHEUMATIC AORTIC VALVE STENOSIS: ICD-10-CM

## 2022-08-01 PROCEDURE — 99213 OFFICE O/P EST LOW 20 MIN: CPT | Performed by: INTERNAL MEDICINE

## 2022-08-01 ASSESSMENT — FIBROSIS 4 INDEX: FIB4 SCORE: 1.54

## 2022-08-04 ENCOUNTER — OFFICE VISIT (OUTPATIENT)
Dept: PHYSICAL MEDICINE AND REHAB | Facility: MEDICAL CENTER | Age: 87
End: 2022-08-04
Payer: MEDICARE

## 2022-08-04 VITALS
SYSTOLIC BLOOD PRESSURE: 122 MMHG | WEIGHT: 158.51 LBS | BODY MASS INDEX: 28.09 KG/M2 | OXYGEN SATURATION: 97 % | DIASTOLIC BLOOD PRESSURE: 82 MMHG | HEIGHT: 63 IN | TEMPERATURE: 97.3 F | HEART RATE: 74 BPM

## 2022-08-04 DIAGNOSIS — G89.29 CHRONIC BILATERAL LOW BACK PAIN WITH RIGHT-SIDED SCIATICA: ICD-10-CM

## 2022-08-04 DIAGNOSIS — M47.816 LUMBAR SPONDYLOSIS: ICD-10-CM

## 2022-08-04 DIAGNOSIS — M53.3 SACROILIAC JOINT DYSFUNCTION OF RIGHT SIDE: ICD-10-CM

## 2022-08-04 DIAGNOSIS — M05.79 RHEUMATOID ARTHRITIS INVOLVING MULTIPLE SITES WITH POSITIVE RHEUMATOID FACTOR (HCC): ICD-10-CM

## 2022-08-04 DIAGNOSIS — M79.10 MYALGIA: ICD-10-CM

## 2022-08-04 DIAGNOSIS — M54.41 CHRONIC BILATERAL LOW BACK PAIN WITH RIGHT-SIDED SCIATICA: ICD-10-CM

## 2022-08-04 DIAGNOSIS — M48.061 SPINAL STENOSIS OF LUMBAR REGION, UNSPECIFIED WHETHER NEUROGENIC CLAUDICATION PRESENT: ICD-10-CM

## 2022-08-04 DIAGNOSIS — M70.61 GREATER TROCHANTERIC BURSITIS OF RIGHT HIP: ICD-10-CM

## 2022-08-04 DIAGNOSIS — R20.0 NUMBNESS AND TINGLING OF RIGHT LEG: ICD-10-CM

## 2022-08-04 DIAGNOSIS — M54.16 LUMBAR RADICULOPATHY: ICD-10-CM

## 2022-08-04 DIAGNOSIS — R20.2 NUMBNESS AND TINGLING OF RIGHT LEG: ICD-10-CM

## 2022-08-04 DIAGNOSIS — G62.9 NEUROPATHY: ICD-10-CM

## 2022-08-04 DIAGNOSIS — M54.16 LUMBAR RADICULITIS: Primary | ICD-10-CM

## 2022-08-04 PROCEDURE — 99215 OFFICE O/P EST HI 40 MIN: CPT | Performed by: STUDENT IN AN ORGANIZED HEALTH CARE EDUCATION/TRAINING PROGRAM

## 2022-08-04 ASSESSMENT — PATIENT HEALTH QUESTIONNAIRE - PHQ9
CLINICAL INTERPRETATION OF PHQ2 SCORE: 3
5. POOR APPETITE OR OVEREATING: 0 - NOT AT ALL
SUM OF ALL RESPONSES TO PHQ QUESTIONS 1-9: 9

## 2022-08-04 ASSESSMENT — FIBROSIS 4 INDEX: FIB4 SCORE: 1.54

## 2022-08-04 ASSESSMENT — PAIN SCALES - GENERAL: PAINLEVEL: 7=MODERATE-SEVERE PAIN

## 2022-08-04 NOTE — PROGRESS NOTES
Follow-up patient Note    Interventional Pain and Spine  Physiatry (Physical Medicine and Rehabilitation)     Patient Name: Marry Mathur  : 1935  Date of Service: 2022      Chief Complaint:   Chief Complaint   Patient presents with   • Follow-Up     Hip pain       HISTORY 3/17/22  Marry Mathur is a 86 y.o. female who presents today with constant low back pain and burning lateral right leg pain that started a couple of years ago with no known inciting event. States she has seen Dr. Cooper for multiple years, most recently 4 months ago, but feels that her pain continues to worsen so she seeks to reestablish care with a new pain provider.     Her pain at its best-worse level during the course of the day is 4-9/10, respectively. Pain right now is 7/10 on the numeric pain scale. Pain worsens with standing and walking and improves slightly with rest and sitting. Her pain interferes somewhat with ADLs. Pain interferes with her ability to work in her yard.  The patient denies new weakness, numbness, or bladder/bowel incontinence. Denies pain in her left leg. Endorses history of neuropathy in feet with no known inciting event. Had an EMG done with Dr. Booth which revealed a left peroneal axonal mononeuropathy per chart. Has hx of RA, feels that her RA symptoms are stable.     Notes that she is able to ambulate more easily by bending forward and using a shopping cart. Notes that propping her legs in a recliner improves her pain.     On xarelto. Seeing Dr. Rodney. Pt states she has been able to stop xarelto for procedures in the past.     The patient is currently going to physical therapy for this problem, most recently last week. Endorses improvement with dry needling lasting 12-24h.     Patient has tried the following medications with varied success (current meds in bold):   Gabapentin 300mg QHS  Tylenol 500mg BID     Also taking fish oil     Therapeutic modalities and interventional  therapies to date include:  -multiple per chart. Pt states the most helpful were bilateral 2-level facet joint injections with helped for a few months  - Lumbar epidural  - pt notes she has had multiple other injections that are not viewable in her [facility] chart     Medical history includes osteoporosis, protein S deficiency, left TKA, left CARLYLE, RA, HLD, HTN, GERD, DVT     Denies hx of back surgery.    Notes from outside provider [facility] reviewed. Indicating:  - L3-4 ILESI on 20  - right L3-4 ILESI on 10/29/2020 - 30% pain relief  - bilateral L4-5 and L5-S1 facet joint injections 12/15/2020 - 50-70% pain relief  - right L3-4 ILESI on 21 - 50% pain relief  - right L2-L4 MBB on 21 with significant pain relief, right L2-L4 MBB on 9/3/21 with significant pain relief, right L2-L4 RFA 10/15/21  One note mentions resolution of leg pain after procedure targeting the facet    HPI  Today's visit   Marry Mathur ( 1935) is a female with The primary encounter diagnosis was Lumbar radiculitis. Diagnoses of Sacroiliac joint dysfunction of right side, Lumbar spondylosis, Chronic bilateral low back pain with right-sided sciatica, Spinal stenosis of lumbar region, unspecified whether neurogenic claudication present, Rheumatoid arthritis involving multiple sites with positive rheumatoid factor (HCC), Neuropathy, Numbness and tingling of right leg, Lumbar radiculopathy, Myalgia, and Greater trochanteric bursitis of right hip were also pertinent to this visit.    S/p 22  R SIJ injection with 24 hr significant relief.  She feels frustrated that nothing has significantly improved her pain and she feels that her pain has been on a downward trajectory of steadily worsening. Still doing PT. Did electric stim with needles with 1 day of improvement. Doing immunotherapy for metastatic melanoma.     Worst pain is at right sacroiliac area.  Also has pain at her lateral right glute and  "thigh.  Also has burning pain at her right calf.  She feels that her pain does not radiate.    States she had consulted with Dr. Bonner a few years ago who discussed surgery but she wanted to avoid surgery at that time.    Pain continues to limit her ability to perform ADLs and garden.    Ongoing difficulty with prolonged standing. Notes she is able to walk for about 20-30 minutes before needing to take a break.  Uses a shopping cart helps.    She continues to take gabapentin and Tylenol as needed.  Denies side effects.  Endorses ongoing pain limited weakness.  Pt denies new numbness, tingling, or weakness.    Procedure history:  - 5/10/22 right Lumbar L4-5 interlaminar epidural steroid injection under fluoroscopic guidance - 10-15% pain relief x 2-3 days  - 5/31/22 right L4-L5 and L5-S1 transforaminal epidural steroid injection - initial 25-30% improvement in pain for 1.5 weeks, then worsening pain.   - 7/12/22 - R SIJ injection - 24 hr significant relief     ROS:   Red Flags ROS:   Fever, Chills, Sweats: Denies  Involuntary Weight Loss: Denies  Bladder Incontinence: Denies  Bowel Incontinence: denies  Saddle Anesthesia: Denies    All other systems reviewed and negative.     PMHx:   Past Medical History:   Diagnosis Date   • Adenocarcinoma of colon (HCC) 10/30/2018    From sessile serrated polyp 10/2018   • Anesthesia     pt states \"one new dr scraped my esophagus when they put the tube in and I started bleeding so they couldn't operate\"    • Atrial fibrillation [I48.91] 04/19/2016     pt denies    • Back pain 06/01/2022    0/10   • Blood clotting disorder (HCC) 2012    clot in leg   • Bowel habit changes     constipation    • Cancer (HCC)     skin, colon 2018   • Cataract     belia IOL    • Chronic back pain greater than 3 months duration    • Deep vein thrombosis (HCC) 03/08/2016    First occurrence in [facility] in late 1970s Second occurrence further up in [facility] in 2012, has been on AC since    • Essential hypertension, " benign 06/27/2012   • GERD (gastroesophageal reflux disease) 06/27/2012   • Heart murmur    • Hyperlipidemia    • Hypertension     hx of, not currently    • Impaired fasting glucose 11/02/2017   • Melanoma (HCC)    • Mild aortic stenosis     Seeing Dr. Van   • Other and unspecified hyperlipidemia 06/27/2012   • Prediabetes    • Protein S deficiency (HCC) 11/02/2017    Noted in lab work 2013 as a part of work up at [facility]    • Rheumatoid arthritis involving multiple sites with positive rheumatoid factor (HCC) 03/14/2016    Dr. Escoto   • Rheumatoid nodule (HCC) 07/25/2017   • Right bundle branch block 06/27/2012    pt denies    • Urinary incontinence 11/02/2017       PSHx:   Past Surgical History:   Procedure Laterality Date   • LA INJECTION,SACROILIAC JOINT Right 7/12/2022    Procedure: RIGHT sacroiliac joint injection with fluoroscopic guidance.;  Surgeon: Indira Lee M.D.;  Location: [facility];  Service: Pain Management   • LA REMOVE ARMPITS LYMPH NODES COMPLT Left 6/7/2022    Procedure: LYMPHADENECTOMY, AXILLARY;  Surgeon: Bernardino Torres M.D.;  Location: SURGERY SAME DAY [facility];  Service: General   • BLOCK EPIDURAL STEROID INJECTION Right 5/31/2022    Procedure: RIGHT L4-5 and L5-S1 transforaminal epidural steroid injection with fluoroscopic guidance.;  Surgeon: Indira Lee M.D.;  Location: [facility];  Service: Pain Management   • BLOCK EPIDURAL STEROID INJECTION Right 05/10/2022    Procedure: Lumbar L4-5 interlaminar epidural steroid injection;  Surgeon: Indira Lee M.D.;  Location: [facility];  Service: Pain Management   • WIDE EXCISION, LESION, HEAD AND NECK REGION Left 03/29/2022    Procedure: WIDE EXCISION, LESION, HEAD AND NECK REGION - RADICAL MALIGNANT MELANOMA OF LEFT CHEST;  Surgeon: Bernardino Torres M.D.;  Location: SURGERY SAME DAY [facility];  Service: General   • LUMBAR MEDIAL BRANCH BLOCKS Bilateral 12/15/2020     Procedure: BLOCK, NERVE, SPINAL, LUMBAR, POSTERIOR RAMUS, MEDIAL BRANCH;  Surgeon: Chris Cooper M.D.;  Location: [facility];  Service: Pain Management   • IRRIGATION & DEBRIDEMENT ORTHO Bilateral 07/31/2020    Procedure: IRRIGATION AND DEBRIDEMENT, WOUND - KNEE;  Surgeon: Roosevelt Saucedo M.D.;  Location: SURGERY [facility];  Service: Orthopedics   • HEMICOLECTOMY Right 12/13/2018    Procedure: OPEN RIGHT HEMICOLECTOMY;  Surgeon: Dash Bhagat M.D.;  Location: SURGERY [facility];  Service: General   • INGUINAL HERNIA REPAIR Right 09/23/2016    Procedure: INGUINAL HERNIA REPAIR - PRIMARY;  Surgeon: Mray Medina M.D.;  Location: SURGERY [facility];  Service:    • OTHER  08/12/2014    peroneal nerve surgery Dr. Castro    • OTHER ORTHOPEDIC SURGERY  2011    meniscus repair   • ARTHROPLASTY Left     hip replacement   • CHOLECYSTECTOMY     • HYSTERECTOMY, TOTAL ABDOMINAL  1970's   • KNEE ARTHROPLASTY TOTAL Left    • ROTATOR CUFF REPAIR Bilateral        Family Hx:   Family History   Problem Relation Age of Onset   • Cancer Mother         stomach   • Cancer Father         colon   • Leukemia Father         many exposure, worked for [employer]    • Heart Disease Brother         s/p stent        Social Hx:  Social History     Socioeconomic History   • Marital status:      Spouse name: Not on file   • Number of children: Not on file   • Years of education: Not on file   • Highest education level: Not on file   Occupational History   • Not on file   Tobacco Use   • Smoking status: Never Smoker   • Smokeless tobacco: Never Used   Vaping Use   • Vaping Use: Never used   Substance and Sexual Activity   • Alcohol use: Yes     Comment: very rare   • Drug use: Not Currently   • Sexual activity: Not Currently     Partners: Male     Comment:     Other Topics Concern   • Not on file   Social History Narrative    Retired from [email or web address]. [employer]  program      Social Determinants of Health     Financial Resource Strain: Not on file   Food Insecurity: Not on file   Transportation Needs: Not on file   Physical Activity: Not on file   Stress: Not on file   Social Connections: Not on file   Intimate Partner Violence: Not on file   Housing Stability: Not on file       Allergies:  Allergies   Allergen Reactions   • Wound Dressing Adhesive      Pt says band-aids cause skin reaction/rash if on for more than 2 days  Paper tape OK         Medications: reviewed on epic.   Outpatient Medications Marked as Taking for the 8/4/22 encounter (Office Visit) with Indira Lee M.D.   Medication Sig Dispense Refill   • gabapentin (NEURONTIN) 300 MG Cap Take 2 Capsules by mouth at bedtime. 180 Capsule 2   • atorvastatin (LIPITOR) 10 MG Tab Take 1 Tablet by mouth every evening. 90 Tablet 1   • Calcium Carbonate (CALCIUM 600 PO) Take 300 mg by mouth.     • SODIUM FLUORIDE 5000 PLUS 1.1 % Cream BRUSH WITH PEA SIZED AMOUNT EVERY DAY PREFERABLY BEFORE BEDTIME. SPIT OUT ALL EXCESS. DO NOT RINSE     • omeprazole (PRILOSEC) 20 MG delayed-release capsule Take 1 Capsule by mouth every day. 90 Capsule 3   • lidocaine (LIDODERM) 5 % Patch APPLY ONE PATCH TO CLEAN DRY SKIN AS DIRECTED DAILY 90 Patch 1   • denosumab (PROLIA) 60 MG/ML Solution Prefilled Syringe injection Inject 60 mg under the skin every 6 months.     • Polyethylene Glycol 3350 (MIRALAX PO) Take  by mouth every day at 6 PM. 1 teaspoon daily     • rivaroxaban (XARELTO) 20 MG Tab tablet Take 1 Tablet by mouth with dinner. 90 Tablet 1   • diphenhydrAMINE-APAP, sleep,  MG Tab Take 2 Tablets by mouth at bedtime.     • acetaminophen (TYLENOL) 500 MG Tab Take 500 mg by mouth 2 times a day.     • vitamin D (CHOLECALCIFEROL) 1000 UNIT Tab Take 1,000 Units by mouth every morning.          Current Outpatient Medications on File Prior to Visit   Medication Sig Dispense Refill   • gabapentin (NEURONTIN) 300 MG Cap Take 2 Capsules by  "mouth at bedtime. 180 Capsule 2   • atorvastatin (LIPITOR) 10 MG Tab Take 1 Tablet by mouth every evening. 90 Tablet 1   • Calcium Carbonate (CALCIUM 600 PO) Take 300 mg by mouth.     • SODIUM FLUORIDE 5000 PLUS 1.1 % Cream BRUSH WITH PEA SIZED AMOUNT EVERY DAY PREFERABLY BEFORE BEDTIME. SPIT OUT ALL EXCESS. DO NOT RINSE     • omeprazole (PRILOSEC) 20 MG delayed-release capsule Take 1 Capsule by mouth every day. 90 Capsule 3   • lidocaine (LIDODERM) 5 % Patch APPLY ONE PATCH TO CLEAN DRY SKIN AS DIRECTED DAILY 90 Patch 1   • denosumab (PROLIA) 60 MG/ML Solution Prefilled Syringe injection Inject 60 mg under the skin every 6 months.     • Polyethylene Glycol 3350 (MIRALAX PO) Take  by mouth every day at 6 PM. 1 teaspoon daily     • rivaroxaban (XARELTO) 20 MG Tab tablet Take 1 Tablet by mouth with dinner. 90 Tablet 1   • diphenhydrAMINE-APAP, sleep,  MG Tab Take 2 Tablets by mouth at bedtime.     • acetaminophen (TYLENOL) 500 MG Tab Take 500 mg by mouth 2 times a day.     • vitamin D (CHOLECALCIFEROL) 1000 UNIT Tab Take 1,000 Units by mouth every morning.       No current facility-administered medications on file prior to visit.         EXAMINATION     Physical Exam:   /82 (BP Location: Right arm, Patient Position: Sitting, BP Cuff Size: Adult)   Pulse 74   Temp 36.3 °C (97.3 °F) (Temporal)   Ht 1.6 m (5' 3\")   Wt 71.9 kg (158 lb 8.2 oz)   SpO2 97%     Constitutional:   Body Habitus: Body mass index is 28.08 kg/m².  Cooperation: Fully cooperates with exam  Appearance: Well-groomed, well-nourished.    Eyes: No scleral icterus to suggest severe liver disease, no proptosis to suggest severe hyperthyroidism    ENT -no obvious auditory deficits, no noticeable facial droop     Skin -no rashes or lesions noted     Respiratory-  breathing comfortably on room air, no audible wheezing    Cardiovascular-distal extremities warm and well perfused.  No lower extremity edema is noted.     Gastrointestinal - no " obvious abdominal masses, non-distended    Psychiatric- alert and oriented ×3. Normal affect.     Gait - antalgic gait favoring right leg. Uses cane for balance.     Musculoskeletal and Neuro -      Thoracic/Lumbar Spine/Sacral Spine/Hips   Inspection: No evidence of atrophy in bilateral lower extremities throughout      ROM: limited active range of motion with lumbar flexion, lateral flexion, and rotation bilaterally.   There is limited active range of motion with lumbar extension     Facet loading maneuver positive on left, neg on right     Palpation:   Tenderness to palpation over the midline of lumbosacral spine, paraspinal muscles bilaterally, right greater trochanter, lumbar facets bilaterally and right sacroiliac area. No tenderness to palpation elsewhere in the low back/hips including sacroiliac joints on left, PSIS bilaterally and greater trochanters bilaterally.     Lumbar spine /hip provocative exam maneuvers  Straight leg raise  negative bilaterally  FADIR test negative bilaterally     SI joint tests  JENNIFER test negative bilaterally  SI joint compression negative bilaterally  SI joint distraction negative bilaterally  Sacral thrust test negative bilaterally  Thigh thrust test negative bilaterally  Yeomans maneuver positive on right, negative on left  James finger sign positive on right, negative on left       Key points for the international standards for neurological classification of spinal cord injury (ISNCSCI) to light touch.   Dermatome R L   L2 2 2   L3 2 2   L4 1 2   L5 1 2   S1 1 1   S2 2 2         Motor Exam Lower Extremities  ? Myotome R L   Hip flexion L2 5 5   Knee extension L3 5 5   Ankle dorsiflexion L4 5 5   Toe extension L5 5 5   Ankle plantarflexion S1 5 5         Previous exam  Bilateral hip abductor 4-/5    Thigh thrust test negative bilaterally  SI joint compression negative bilaterally  SI joint distraction negative bilaterally  Sacral thrust test negative bilaterally  Enedelia  maneuver negative bilaterally      Reflexes  ?   R L   Patella   2+ 2+   Achilles    2+ 2+      Clonus of the ankle negative bilaterally       MEDICAL DECISION MAKING    Medical records review: see under HPI section.     DATA    Labs: Personally reviewed at today's visit    Lab Results   Component Value Date/Time    SODIUM 133 (L) 06/01/2022 11:55 AM    POTASSIUM 4.2 06/01/2022 11:55 AM    CHLORIDE 97 06/01/2022 11:55 AM    CO2 24 06/01/2022 11:55 AM    ANION 12.0 06/01/2022 11:55 AM    GLUCOSE 98 06/01/2022 11:55 AM    BUN 26 (H) 06/01/2022 11:55 AM    CREATININE 0.77 06/01/2022 11:55 AM    CALCIUM 8.9 06/01/2022 11:55 AM    ASTSGOT 17 06/01/2022 11:55 AM    ALTSGPT 21 06/01/2022 11:55 AM    TBILIRUBIN 0.6 06/01/2022 11:55 AM    ALBUMIN 4.4 06/01/2022 11:55 AM    TOTPROTEIN 6.9 06/01/2022 11:55 AM    GLOBULIN 2.5 06/01/2022 11:55 AM    AGRATIO 1.8 06/01/2022 11:55 AM       Lab Results   Component Value Date/Time    PROTHROMBTM 15.2 (H) 03/22/2022 03:25 PM    INR 1.23 (H) 03/22/2022 03:25 PM        Lab Results   Component Value Date/Time    WBC 9.1 06/01/2022 11:55 AM    RBC 4.32 06/01/2022 11:55 AM    HEMOGLOBIN 13.6 06/01/2022 11:55 AM    HEMATOCRIT 40.8 06/01/2022 11:55 AM    MCV 94.4 06/01/2022 11:55 AM    MCH 31.5 06/01/2022 11:55 AM    MCHC 33.3 (L) 06/01/2022 11:55 AM    MPV 11.0 06/01/2022 11:55 AM    NEUTSPOLYS 78.10 (H) 06/01/2022 11:55 AM    LYMPHOCYTES 11.30 (L) 06/01/2022 11:55 AM    MONOCYTES 8.70 06/01/2022 11:55 AM    EOSINOPHILS 1.20 06/01/2022 11:55 AM    BASOPHILS 0.30 06/01/2022 11:55 AM        Lab Results   Component Value Date/Time    HBA1C 5.5 05/16/2016 07:50 AM      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 Mild bilatearl neuroforaminal stenosis at L4-L5 and L5-S1. Facet arthropathy worst at  bilateral L3-L4, L4-L5, and L5-S1. Grade 1 anterolisthesis of L4 on L5.     MRI lumbar spine 5/26/20  Severe bilateral facet arthropathy at L4-L5. Moderate-severe facet arthropathy at bilateral L3-L4. Grade 1 anterolisthesis of L4 on L5. Mod-severe right neuroforaminal stenosis at L3-L4, mild left neuroforaminal stenosis at L3-L4. Moderate central stenosis at L3-4.               IMAGING radiology reads. I reviewed the following radiology reads                      Results for orders placed during the hospital encounter of 03/31/22    MR-LUMBAR SPINE-W/O    Impression  Interval worsening L3/4 central protrusion results in worsening moderate to severe central canal and moderate left foraminal stenoses    Mild worsening T12/L1 degenerative disc disease resulting in new mild central stenosis    Otherwise stable multilevel degenerative change resulting in foraminal predominate stenoses as detailed above    Stable L4/5 grade 1 anterolisthesis of secondary to severe facet arthropathy. Stable minimal degenerative retrolisthesis of the upper lumbar levels    Mature L5/S1 interbody fusion                 Results for orders placed during the hospital encounter of 05/26/20     MR-LUMBAR SPINE-W/O     Impression  Interval worsening L3/4 central protrusion results in worsening moderate central stenosis     Otherwise stable multilevel degenerative change resulting in foraminal predominate stenoses, greatest is moderate to severe on the right at L3/4.     Lesser stenoses at other levels as detailed above     Stable L4/5 grade 1 anterolisthesis of secondary to severe facet arthropathy. Stable minimal degenerative retrolisthesis of the upper lumbar levels     Mature L5/S1 interbody fusion      Results for orders placed during the hospital encounter of 11/01/16     DX-LUMBAR SPINE-2 OR 3 VIEWS     Impression  1.  There has been progression of mild diffuse degenerative disc disease and facet disease throughout the entire lumbar  spine.  2.  There are degenerative areas of anterolisthesis and retrolisthesis as noted above.      Results for orders placed during the hospital encounter of 20     DX-LUMBAR SPINE-4+ VIEWS     Impression  1.  No compression deformity or acute fracture is identified.     2.  There is grade 2 anterolisthesis now identified at L4-L5 which is significantly increased compared to the prior exam. There appears to be L4 spondylolysis.     3.  Some interval increase in degenerative disc disease and facet arthropathy.     4.  No focal instability noted on flexion extension views.         Diagnosis  Visit Diagnoses     ICD-10-CM   1. Lumbar radiculitis  M54.16   2. Sacroiliac joint dysfunction of right side  M53.3   3. Lumbar spondylosis  M47.816   4. Chronic bilateral low back pain with right-sided sciatica  M54.41    G89.29   5. Spinal stenosis of lumbar region, unspecified whether neurogenic claudication present  M48.061   6. Rheumatoid arthritis involving multiple sites with positive rheumatoid factor (HCC)  M05.79   7. Neuropathy  G62.9   8. Numbness and tingling of right leg  R20.0    R20.2   9. Lumbar radiculopathy  M54.16   10. Myalgia  M79.10   11. Greater trochanteric bursitis of right hip  M70.61         ASSESSMENT AND PLAN:  Marry Mathur (: 1935) is a female with history of osteoporosis, protein S deficiency, left TKA, left CARLYLE, RA, HLD, HTN, GERD, DVT on chronic Xarelto, and neuropathy in bilateral feet who presents with progressively ongoing severe right low back pain, pain at her right lateral hip corresponding to her right greater trochanter, and burning pain at her lateral right calf.    Possibly etiology lumbar radiculopathy 2/2 neuroforaminal stenosis at right L3-L4 and right L4-L5 on MRI 3/31/22. Her worst pain is in her right low back and her right leg in the right L4 and L5 distribution.  Only partial slight relief with ILESI at right L4-L5, and right L4-L5 and L5-S1  transforaminal epidural steroid injection    Also has positive right-sided lumbar facet loading maneuver suggestive of lumbar facetogenic pain    She endorses worsening pain with lumbar extension improved with lumbar flexion. Denies left leg symptoms. Lumbar spinal stenosis with neurogenic claudication is in the differential although I feel it is less likely to be the main etiology of her symptoms at this time.     Marry was seen today for follow-up.    Diagnoses and all orders for this visit:    Lumbar radiculitis  -     Referral for Acupuncture  -     Referral to Neurosurgery    Sacroiliac joint dysfunction of right side  -     Referral for Acupuncture    Lumbar spondylosis  -     Referral for Acupuncture    Chronic bilateral low back pain with right-sided sciatica  -     Referral for Acupuncture  -     Referral to Neurosurgery    Spinal stenosis of lumbar region, unspecified whether neurogenic claudication present  -     Referral for Acupuncture  -     Referral to Neurosurgery    Rheumatoid arthritis involving multiple sites with positive rheumatoid factor (HCC)  -     Referral for Acupuncture    Neuropathy  -     Referral for Acupuncture    Numbness and tingling of right leg  -     Referral for Acupuncture  -     Referral to Neurosurgery    Lumbar radiculopathy  -     Referral for Acupuncture    Myalgia  -     Referral to Pain Clinic    Greater trochanteric bursitis of right hip  -     Referral to Pain Clinic          PLAN  Physical Therapy: continue PT.  discussed that she should usually triggered her schedule presents with her PT.     Diagnostic workup: no new imaging needed at this time. Again reviewed at today's visit and discussed with patient: MRI lumbar spine 3/31/22     Medications:  -  Gabapentin  600mg QHS. Represcribed 300mg tablets today for ease of taking medication.  Counseled that she may experience side effect of drowsiness while her body is adjusting to the medicine and that if this interferes  with her quality of life or ability to perform ADLs, she should decrease it back to 400 mg nightly  - NSAIDs contraindicated due to being on chronic anticoagulation for protein S deficiency and hx of DVT     Interventions:  Trigger point injections with local anesthetic under ultrasound guidance targeting sacroiliac area. The risks, benefits, and alternatives to this procedure were discussed and the patient wishes to proceed with the procedure. Risks include but are not limited to damage to surrounding structures, infection, bleeding, worsening of pain which can be permanent. Benefits include pain relief and improved function. Alternatives include not doing the procedure.  -Right greater trochanteric bursa injection in late August/early September to facilitate at least 6-8 weeks from her last steroid injection  - s/p right sacroiliac joint injection with 1 day of significant pain improvement  - s/p right L4-L5 and L5-S1 transforaminal epidural steroid injection.   - s/p right Lumbar L4-5 interlaminar epidural steroid injection under fluoroscopic guidance.   - requesting pt hold Xarelto for 3 days prior and restart 24 hours after - previously received clearance     Follow-up: For trigger point procedure above, next available    Indira Lee MD  Interventional Pain and Spine  Physical Medicine and Rehabilitation  [facility]    The above note documents my personal evaluation of this patient. In addition, I have reviewed and confirmed with the patient and MA the supportive information documented in today's Patient Health Questionnaire and Office Note.     Total time: 41 minutes. I spent greater than 50% of the time for patient care and coordination on this date, including face-to-face time with the patient as per assessment and plan above.     Please note that this dictation was created using voice recognition software. I have made every reasonable attempt to correct obvious errors, but I expect that there are  errors of grammar and possibly content that I did not discover before finalizing the note.

## 2022-08-08 ENCOUNTER — APPOINTMENT (RX ONLY)
Dept: URBAN - METROPOLITAN AREA CLINIC 4 | Facility: CLINIC | Age: 87
Setting detail: DERMATOLOGY
End: 2022-08-08

## 2022-08-08 DIAGNOSIS — L82.1 OTHER SEBORRHEIC KERATOSIS: ICD-10-CM

## 2022-08-08 DIAGNOSIS — L81.4 OTHER MELANIN HYPERPIGMENTATION: ICD-10-CM

## 2022-08-08 DIAGNOSIS — L57.0 ACTINIC KERATOSIS: ICD-10-CM

## 2022-08-08 DIAGNOSIS — L82.0 INFLAMED SEBORRHEIC KERATOSIS: ICD-10-CM

## 2022-08-08 DIAGNOSIS — Z86.006 PERSONAL HISTORY OF MELANOMA IN-SITU: ICD-10-CM

## 2022-08-08 DIAGNOSIS — D22 MELANOCYTIC NEVI: ICD-10-CM

## 2022-08-08 DIAGNOSIS — D18.0 HEMANGIOMA: ICD-10-CM

## 2022-08-08 DIAGNOSIS — Z85.828 PERSONAL HISTORY OF OTHER MALIGNANT NEOPLASM OF SKIN: ICD-10-CM

## 2022-08-08 DIAGNOSIS — L57.8 OTHER SKIN CHANGES DUE TO CHRONIC EXPOSURE TO NONIONIZING RADIATION: ICD-10-CM

## 2022-08-08 PROBLEM — D22.5 MELANOCYTIC NEVI OF TRUNK: Status: ACTIVE | Noted: 2022-08-08

## 2022-08-08 PROBLEM — D18.01 HEMANGIOMA OF SKIN AND SUBCUTANEOUS TISSUE: Status: ACTIVE | Noted: 2022-08-08

## 2022-08-08 PROCEDURE — 17004 DESTROY PREMAL LESIONS 15/>: CPT

## 2022-08-08 PROCEDURE — 17110 DESTRUCTION B9 LES UP TO 14: CPT | Mod: 59,79

## 2022-08-08 PROCEDURE — 99213 OFFICE O/P EST LOW 20 MIN: CPT | Mod: 25,24

## 2022-08-08 PROCEDURE — ? COUNSELING

## 2022-08-08 PROCEDURE — ? LIQUID NITROGEN

## 2022-08-08 ASSESSMENT — LOCATION DETAILED DESCRIPTION DERM
LOCATION DETAILED: LEFT DORSAL HAND
LOCATION DETAILED: RIGHT DORSAL HAND
LOCATION DETAILED: RIGHT ANTERIOR THIGH
LOCATION DETAILED: LEFT LATERAL EYEBROW
LOCATION DETAILED: NASAL SUPRATIP
LOCATION DETAILED: LEFT MID BACK
LOCATION DETAILED: LEFT LATERAL SUPERIOR CHEST
LOCATION DETAILED: RIGHT DORSAL FOREARM
LOCATION DETAILED: LEFT CENTRAL PARIETAL SCALP
LOCATION DETAILED: SUPERIOR MID FOREHEAD
LOCATION DETAILED: NASAL DORSUM
LOCATION DETAILED: LEFT INFERIOR TEMPLE
LOCATION DETAILED: LEFT SUPERIOR OCCIPITAL SCALP
LOCATION DETAILED: 3RD WEB SPACE RIGHT HAND
LOCATION DETAILED: RIGHT PROXIMAL POSTERIOR UPPER ARM
LOCATION DETAILED: LEFT UPPER BACK
LOCATION DETAILED: LEFT DORSAL FOREARM
LOCATION DETAILED: LEFT INFERIOR CENTRAL MALAR CHEEK
LOCATION DETAILED: LEFT ANTERIOR THIGH
LOCATION DETAILED: POSTERIOR MID-PARIETAL SCALP
LOCATION DETAILED: LEFT CHEEK
LOCATION DETAILED: RIGHT SUPERIOR PARIETAL SCALP
LOCATION DETAILED: RIGHT UPPER BACK
LOCATION DETAILED: LEFT CENTRAL MALAR CHEEK
LOCATION DETAILED: LEFT POPLITEAL SKIN
LOCATION DETAILED: RIGHT CHEEK
LOCATION DETAILED: RIGHT RADIAL DORSAL HAND
LOCATION DETAILED: LEFT SUPERIOR PARIETAL SCALP

## 2022-08-08 ASSESSMENT — LOCATION SIMPLE DESCRIPTION DERM
LOCATION SIMPLE: CHEST
LOCATION SIMPLE: NOSE
LOCATION SIMPLE: LEFT UPPER EXTREMITY
LOCATION SIMPLE: LEFT LOWER EXTREMITY
LOCATION SIMPLE: LEFT HAND
LOCATION SIMPLE: RIGHT HAND
LOCATION SIMPLE: SUPERIOR FOREHEAD
LOCATION SIMPLE: LEFT EYEBROW
LOCATION SIMPLE: RIGHT CHEEK
LOCATION SIMPLE: LEFT OCCIPITAL SCALP
LOCATION SIMPLE: LEFT POPLITEAL SKIN
LOCATION SIMPLE: LEFT CHEEK
LOCATION SIMPLE: RIGHT UPPER EXTREMITY
LOCATION SIMPLE: BACK
LOCATION SIMPLE: RIGHT POSTERIOR UPPER ARM
LOCATION SIMPLE: LEFT TEMPLE
LOCATION SIMPLE: SCALP
LOCATION SIMPLE: RIGHT LOWER EXTREMITY
LOCATION SIMPLE: POSTERIOR SCALP

## 2022-08-08 ASSESSMENT — LOCATION ZONE DERM
LOCATION ZONE: SCALP
LOCATION ZONE: TRUNK
LOCATION ZONE: TRUNK
LOCATION ZONE: ARM
LOCATION ZONE: LEG
LOCATION ZONE: HAND
LOCATION ZONE: FACE
LOCATION ZONE: NOSE

## 2022-08-08 NOTE — PROCEDURE: LIQUID NITROGEN
Aperture Size (Optional): C
Show Applicator Variable?: Yes
Detail Level: Simple
Render Post-Care Instructions In Note?: no
Number Of Freeze-Thaw Cycles: 2 freeze-thaw cycles
Duration Of Freeze Thaw-Cycle (Seconds): 3
Post-Care Instructions: I reviewed with the patient in detail post-care instructions. Patient is to wear sunprotection, and avoid picking at any of the treated lesions. Pt may apply Vaseline to crusted or scabbing areas.
Consent: The patient's consent was obtained including but not limited to risks of crusting, scabbing, blistering, scarring, darker or lighter pigmentary change, recurrence, incomplete removal and infection.
Medical Necessity Information: It is in your best interest to select a reason for this procedure from the list below. All of these items fulfill various CMS LCD requirements except the new and changing color options.
Medical Necessity Clause: This procedure was medically necessary because the lesions that were treated were:
Number Of Freeze-Thaw Cycles: 1 freeze-thaw cycle
Spray Paint Text: The liquid nitrogen was applied to the skin utilizing a spray paint frosting technique.
Detail Level: Detailed

## 2022-08-08 NOTE — HPI: EVALUATION OF SKIN LESION(S)
What Type Of Note Output Would You Prefer (Optional)?: Standard Output
Hpi Title: Evaluation of Skin Lesions
Family Member: Brother
Location: Chest
Year Removed: 2/2022

## 2022-08-11 ENCOUNTER — HOSPITAL ENCOUNTER (OUTPATIENT)
Dept: RADIOLOGY | Facility: MEDICAL CENTER | Age: 87
End: 2022-08-11
Attending: INTERNAL MEDICINE
Payer: MEDICARE

## 2022-08-11 DIAGNOSIS — I10 ESSENTIAL HYPERTENSION, BENIGN: ICD-10-CM

## 2022-08-11 DIAGNOSIS — Z79.01 CHRONIC ANTICOAGULATION: ICD-10-CM

## 2022-08-11 DIAGNOSIS — M15.9 PRIMARY OSTEOARTHRITIS INVOLVING MULTIPLE JOINTS: ICD-10-CM

## 2022-08-11 DIAGNOSIS — M81.0 OSTEOPOROSIS WITHOUT CURRENT PATHOLOGICAL FRACTURE, UNSPECIFIED OSTEOPOROSIS TYPE: ICD-10-CM

## 2022-08-11 DIAGNOSIS — H35.30 MACULAR DEGENERATION OF BOTH EYES, UNSPECIFIED TYPE: ICD-10-CM

## 2022-08-11 DIAGNOSIS — D68.59 PROTEIN S DEFICIENCY (HCC): ICD-10-CM

## 2022-08-11 PROCEDURE — 77080 DXA BONE DENSITY AXIAL: CPT

## 2022-08-12 ENCOUNTER — OFFICE VISIT (OUTPATIENT)
Dept: PHYSICAL MEDICINE AND REHAB | Facility: MEDICAL CENTER | Age: 87
End: 2022-08-12
Payer: MEDICARE

## 2022-08-12 VITALS
HEIGHT: 63 IN | SYSTOLIC BLOOD PRESSURE: 140 MMHG | HEART RATE: 69 BPM | TEMPERATURE: 97.7 F | WEIGHT: 157.63 LBS | OXYGEN SATURATION: 97 % | DIASTOLIC BLOOD PRESSURE: 84 MMHG | BODY MASS INDEX: 27.93 KG/M2

## 2022-08-12 DIAGNOSIS — M79.10 MYALGIA: ICD-10-CM

## 2022-08-12 PROCEDURE — 76942 ECHO GUIDE FOR BIOPSY: CPT | Performed by: STUDENT IN AN ORGANIZED HEALTH CARE EDUCATION/TRAINING PROGRAM

## 2022-08-12 PROCEDURE — 20553 NJX 1/MLT TRIGGER POINTS 3/>: CPT | Performed by: STUDENT IN AN ORGANIZED HEALTH CARE EDUCATION/TRAINING PROGRAM

## 2022-08-12 RX ORDER — TIZANIDINE 2 MG/1
1-2 TABLET ORAL NIGHTLY PRN
Qty: 30 TABLET | Refills: 2 | Status: ON HOLD | OUTPATIENT
Start: 2022-08-12 | End: 2022-09-23

## 2022-08-12 ASSESSMENT — PATIENT HEALTH QUESTIONNAIRE - PHQ9: CLINICAL INTERPRETATION OF PHQ2 SCORE: 0

## 2022-08-12 ASSESSMENT — PAIN SCALES - GENERAL: PAINLEVEL: 9=SEVERE PAIN

## 2022-08-12 ASSESSMENT — FIBROSIS 4 INDEX: FIB4 SCORE: 1.54

## 2022-08-13 NOTE — PROCEDURES
Patient Name: Marry Mathur  : 1935  Date of Service: 2022    Physician/s: Indira Lee MD    Pre-operative Diagnosis: Myalgia (M79.1)    Post-operative Diagnosis: Myalgia (M79.1)    Procedure: trigger point injections of the following muscles:    Site R L   Splenius capitis     Splenius cervicis     Sternocleidomastoid     Rhomboids     Levator scapulae     Pectoralis minor     Pectoralis major     Serratus anterior     Teres major/minor     Quadratus lumborum     Paravertebral, cervical     Paravertebral, thoracic     Paravertebral, lumbar     Gluteus jami x x   Gluteus medius x    Gluteus minimus     Tensor fascia marizol     Vastus lateralis     Adductor sebastian     Adductor longus     Occipitalis     Cervical paraspinal     Trapezius, upper     Trapezius, mid     Trapezius, lower       Description of procedure:    The risks, benefits, and alternatives of the procedure were reviewed and discussed with the patient.  Written informed consent was freely obtained. A pre-procedural time-out was conducted by the physician verifying patient’s identity, procedure to be performed, procedure site and side, and allergy verification. Appropriate equipment was determined to be in place for the procedure.     In the office suite exam room the patient was placed in a prone position and the skin areas for injection over the above muscles were marked. A total of 10 areas of pain were identified for injection. The areas of pain were then prepped and draped in the usual sterile fashion. A solution was prepared with 10 mL of 1% lidocaine. Ultrasound was confirmed to view the adjacent structures for blood vessels and nerves and to confirm the needle path was not within the structures. A 27g needle was placed into each of the markings at the areas above under ultrasound guidance with an in plane approach.. After negative aspiration, approximately 1.5 mL of the above solution was injected. The needle was removed  intact after each trigger point injection, and the patient's back was covered with a 4x4 gauze, the area was cleansed with sterile normal saline, and a dressing was applied. There were no complications noted. The images were uploaded to our media tab for permanent storage.    Indira Lee MD  Interventional Pain and Spine  Physical Medicine and Rehabilitation  [facility]

## 2022-08-18 ENCOUNTER — HOSPITAL ENCOUNTER (OUTPATIENT)
Dept: RADIOLOGY | Facility: MEDICAL CENTER | Age: 87
End: 2022-08-18
Attending: NURSE PRACTITIONER
Payer: MEDICARE

## 2022-08-18 DIAGNOSIS — Z12.31 VISIT FOR SCREENING MAMMOGRAM: ICD-10-CM

## 2022-08-18 PROCEDURE — 77063 BREAST TOMOSYNTHESIS BI: CPT

## 2022-08-22 ENCOUNTER — OFFICE VISIT (OUTPATIENT)
Dept: MEDICAL GROUP | Facility: PHYSICIAN GROUP | Age: 87
End: 2022-08-22
Payer: MEDICARE

## 2022-08-22 VITALS
WEIGHT: 157 LBS | OXYGEN SATURATION: 96 % | BODY MASS INDEX: 27.82 KG/M2 | HEART RATE: 69 BPM | TEMPERATURE: 97.9 F | SYSTOLIC BLOOD PRESSURE: 138 MMHG | HEIGHT: 63 IN | DIASTOLIC BLOOD PRESSURE: 92 MMHG

## 2022-08-22 DIAGNOSIS — Z86.718 HISTORY OF RECURRENT DEEP VEIN THROMBOSIS (DVT): ICD-10-CM

## 2022-08-22 DIAGNOSIS — R29.898 HIP ASYMMETRY: ICD-10-CM

## 2022-08-22 PROCEDURE — 99214 OFFICE O/P EST MOD 30 MIN: CPT | Performed by: NURSE PRACTITIONER

## 2022-08-22 RX ORDER — PHENOL 1.4 %
10 AEROSOL, SPRAY (ML) MUCOUS MEMBRANE NIGHTLY
Status: ON HOLD | COMMUNITY

## 2022-08-22 ASSESSMENT — FIBROSIS 4 INDEX: FIB4 SCORE: 1.54

## 2022-08-22 NOTE — PROGRESS NOTES
"Subjective:     CC: Paperwork    HPI:   Marry presents today with the following:      Right hip symmetry  She reports history of right hip surgery in 2019.  Post surgery the right hip is 0.5 inches shorter than her left hip.  She has been using a right heel lift, 1/2 inch off her heel to help. She is wanting to wear sandals and brings in a prescription so she can have a new heel lift made. She is followed by [employer] for her current lift.      No problem-specific Assessment & Plan notes found for this encounter.      Past Medical History:   Diagnosis Date    Adenocarcinoma of colon (HCC) 10/30/2018    From sessile serrated polyp 10/2018    Anesthesia     pt states \"one new dr scraped my esophagus when they put the tube in and I started bleeding so they couldn't operate\"     Atrial fibrillation [I48.91] 04/19/2016     pt denies     Back pain 06/01/2022    0/10    Blood clotting disorder (HCC) 2012    clot in leg    Bowel habit changes     constipation     Cancer (HCC)     skin, colon 2018    Cataract     belia IOL     Chronic back pain greater than 3 months duration     Deep vein thrombosis (HCC) 03/08/2016    First occurrence in LLE in late 1970s Second occurrence further up in LLE in 2012, has been on AC since     Essential hypertension, benign 06/27/2012    GERD (gastroesophageal reflux disease) 06/27/2012    Heart murmur     Hyperlipidemia     Hypertension     hx of, not currently     Impaired fasting glucose 11/02/2017    Melanoma ([facility])     Mild aortic stenosis     Seeing Dr. Van    Other and unspecified hyperlipidemia 06/27/2012    Prediabetes     Protein S deficiency ([facility]) 11/02/2017    Noted in lab work 2013 as a part of work up at [facility]     Rheumatoid arthritis involving multiple sites with positive rheumatoid factor ([facility]) 03/14/2016    Dr. Escoto    Rheumatoid nodule ([facility]) 07/25/2017    Right bundle branch block 06/27/2012    pt denies     Urinary incontinence 11/02/2017       Social History " "    Tobacco Use    Smoking status: Never    Smokeless tobacco: Never   Vaping Use    Vaping Use: Never used   Substance Use Topics    Alcohol use: Yes     Comment: very rare    Drug use: Not Currently       Current Outpatient Medications Ordered in Epic   Medication Sig Dispense Refill    melatonin 5 mg Tab Take 5 mg by mouth every day.      NON SPECIFIED LLD. Right 1/2\"  Limb length difference. 1 Each 0    rivaroxaban (XARELTO) 20 MG Tab tablet Take 1 Tablet by mouth with dinner. 90 Tablet 1    Multiple Vitamins-Minerals (PRESERVISION AREDS PO) Take  by mouth.      nivolumab (OPDIVO) 100 MG/10ML Solution Infuse  into a venous catheter.      tizanidine (ZANAFLEX) 2 MG tablet Take 0.5-1 Tablets by mouth at bedtime as needed (muscle spasms). 30 Tablet 2    lidocaine (LIDODERM) 5 % Patch APPLY ONE PATCH TO CLEAN DRY SKIN AS DIRECTED DAILY 90 Patch 1    gabapentin (NEURONTIN) 300 MG Cap Take 2 Capsules by mouth at bedtime. 180 Capsule 2    atorvastatin (LIPITOR) 10 MG Tab Take 1 Tablet by mouth every evening. 90 Tablet 1    Calcium Carbonate (CALCIUM 600 PO) Take 300 mg by mouth.      SODIUM FLUORIDE 5000 PLUS 1.1 % Cream BRUSH WITH PEA SIZED AMOUNT EVERY DAY PREFERABLY BEFORE BEDTIME. SPIT OUT ALL EXCESS. DO NOT RINSE      omeprazole (PRILOSEC) 20 MG delayed-release capsule Take 1 Capsule by mouth every day. 90 Capsule 3    denosumab (PROLIA) 60 MG/ML Solution Prefilled Syringe injection Inject 60 mg under the skin every 6 months.      acetaminophen (TYLENOL) 500 MG Tab Take 500 mg by mouth 2 times a day.      vitamin D (CHOLECALCIFEROL) 1000 UNIT Tab Take 500 Units by mouth every morning.      diphenhydrAMINE-APAP, sleep,  MG Tab Take 2 Tablets by mouth at bedtime. (Patient not taking: No sig reported)       No current [facility]-ordered facility-administered medications on file.       Allergies:  Wound dressing adhesive    Health Maintenance: Reviewed      Objective:     Vital signs reviewed  Exam:  BP (!) 138/92 (BP " "Location: Right arm, Patient Position: Sitting, BP Cuff Size: Adult)   Pulse 69   Temp 36.6 °C (97.9 °F) (Temporal)   Ht 1.6 m (5' 3\")   Wt 71.2 kg (157 lb)   LMP  (LMP Unknown)   SpO2 96%   BMI 27.81 kg/m²  Body mass index is 27.81 kg/m².    Gen: Alert and oriented, No apparent distress.  Eyes:   Lids normal. Glasses in place.   Lungs: Normal effort, CTA bilaterally, no wheezes, rhonchi, or rales  CV: Regular rate and rhythm with systolic murmur. No rubs or gallops.  Ext: No clubbing, cyanosis, edema.    Assessment & Plan:     86 y.o. female with the following -     1. Hip asymmetry  Chronic stable problem.  She would like the prescription that she brought in from her doctor to be completed today.  I have signed will scan copy to chart.  Also provided her with a prescription from our office today.  Follow-up as needed.  - NON SPECIFIED; LLD. Right 1/2\"  Limb length difference.  Dispense: 1 Each; Refill: 0    2. History of recurrent deep vein thrombosis (DVT)  Chronic stable problem.  She continues with Xarelto 20 mg every evening meal.  She is followed by anticoagulation clinic.  She would like a medication refill today as she is out of the medication.  She is tolerating the medication.  - rivaroxaban (XARELTO) 20 MG Tab tablet; Take 1 Tablet by mouth with dinner.  Dispense: 90 Tablet; Refill: 1  - Referral to Anticoagulation Monitoring      Return if symptoms worsen or fail to improve.    Please note that this dictation was created using voice recognition software. I have made every reasonable attempt to correct obvious errors, but I expect that there are errors of grammar and possibly content that I did not discover before finalizing the note.        "

## 2022-08-22 NOTE — PROGRESS NOTES
----- Message from Franchesca Tanner MD sent at 8/19/2022  8:33 AM EDT -----  Let patient know her culture was positive for yeast infection and I have sent rx for diflucan   Chief Complaint- joint pain    Subjective:   Marry Mathur is a 81 y.o. female here today for follow up of rheumatological issues    Pt here with a dx of RA at Dr Peguero office currently on  MTX 15 mg po qweek and is awaiting a Humira shipment to start Humira injections 40 mg subcutaneous every 2 weeks. Patient had been on Orencia but that lost its efficacy. Patient states her fingers are still quite swollen and painful to move, difficulty making a fist bilaterally. No psoriaisis, positive skin cancer, positive melanoma 2001, no iritis/uveitis, no FUO, no weight loss, no recurrent infections, no colitis,  Pt also diabetic, takes metformin, also with a diagnosis of spinal stenosis with intermittent weakness in her legs. Patient currently undergoing physical therapy for balance and strength as in her legs, thinks it is helping her. Denies any new rashes, denies any unexplained weight loss, denies any new skin cancers, is followed very closely by her dermatologist regarding skin cancers.     S/p Remicade-stopped because of hx of melanoma and has multiple other skin cancers  S/p Orencia-lost efficacy      RF neg 6/2016  CCP neg 6/2016  Uric acid 4.5 6/2016  Quantiferon Gold neg 6/2016  Hep B neg 6/2016  Hand X-rays 3/2016  Indicates DJD  Feet x-rays 3/2016  Indicates DJD  DEXA 3/2016 T scores 1.1, -1.1   Echocardiogram 7/2012 normal [facility]           Current medicines (including changes today)  Current Outpatient Prescriptions   Medication Sig Dispense Refill   • pregabalin (LYRICA) 75 MG Cap Take 1 Cap by mouth 2 times a day. 180 Cap 1   • hydrocodone-acetaminophen (NORCO) 5-325 MG Tab per tablet 1 tab po qhs for severe joint pain, NO ALCOHOL and NO DRIVING within 24 hrs of taking this medication, NO BENZODIAZEPINE medications, no selling or giving to any other persons 30 Tab 0   • methotrexate 2.5 MG Tab 6 tabs po q wednesday 72 Tab 0   • folic acid (FOLVITE) 400 MCG tablet Take 400 mcg by  mouth every day.     • valsartan-hydrochlorothiazide (DIOVAN HCT) 80-12.5 MG per tablet Take 0.5 Tabs by mouth every day.     • vitamin D (CHOLECALCIFEROL) 1000 UNIT Tab Take 1,000 Units by mouth every day.     • atorvastatin (LIPITOR) 20 MG Tab Take 20 mg by mouth every evening.     • metformin (GLUCOPHAGE) 500 MG Tab Take 500 mg by mouth every day.     • warfarin (COUMADIN) 2.5 MG TABS Take 2.5 mg by mouth every day. AS INSTRUCTED      • POTASSIUM GLUCONATE Take 1 Tab by mouth every day.     • magnesium oxide (MAG-OX) 400 MG TABS Take 400 mg by mouth 2 times a day.     • Calcium Carbonate-Vitamin D (CALCIUM + D PO) Take 1 Tab by mouth every day.     • Adalimumab 40 MG/0.8ML Prefilled Syringe Kit Inject 0.8 mL as instructed every 14 days. 6 Each 1   • Misc Natural Products (MIDNITE PM PO) Take  by mouth.     • acetaminophen (TYLENOL) 325 MG Tab Take 650 mg by mouth at bedtime as needed.     • omeprazole (PRILOSEC) 20 MG CPDR Take 20 mg by mouth every day.       No current facility-administered medications for this visit.     She  has a past medical history of Prediabetes; Hyperlipidemia; Hypertension; Rheumatoid arthritis with rheumatoid factor (HCC); Hiatus hernia syndrome; Cancer (HCC); Chronic back pain greater than 3 months duration; and Blood clotting disorder ([facility]) (2012).    ROS   Other than what is mentioned in HPI or physical exam, there is no history of headaches, double vision or blurred vision. No temporal tenderness or jaw claudication. No history of cataracts or glaucoma. No trouble swallowing difficulties or sore throats. No history of thyroid disease. No chest complaints including chest pain, cough or sputum production. No shortness of breath. No GI complaints including nausea, vomiting, change in bowel habits, or past peptic ulcer disease. No history of blood in the stools. No urinary complaints including dysuria or frequency. No history of rash including psoriasis. No history of alopecia,  photosensitivity, oral ulcerations, Raynaud's phenomena, or swollen joints. No history of gout. No back complaints. No history of low blood counts.       Objective:     Blood pressure 130/72, weight 76.204 kg (168 lb). Body mass index is 29.4 kg/(m^2).   Physical Exam:  Constitutional: Alert and oriented X3,.Skin: Warm, dry, good turgor, no rashes in visible areas, multiple areas of resection of skin cancersEye: Equal, round and reactive, conjunctiva clear, lids normal EOM intact , mild arcus senilis bilaterallyENMT: Lips without lesions, good dentition, no oropharyngeal ulcers, moist buccal mucosa, pinna without deformityNeck: Trachea midline, no masses, no thyromegaly.Lymph:  No cervical lymphadenopathy, no axillary lymphadenopathy, no supraclavicular lymphadenopathyRespiratory: Unlabored respiratory effort, lungs clear to auscultation, no wheezes, no ronchi.Cardiovascular: Normal S1, S2, patient has a 3/6 systolic ejection murmur heard both at the right and left sternal borders, no edema.Abdomen: Soft, non-tender, no masses, no hepatosplenomegaly.Psych: Alert and oriented x3, normal affect and mood.Neuro: Cranial nerves 2-12 are grossly intact, no loss of sensation LEExt:no joint laxity noted in bilateral arms, no joint laxity noted in bilateral legs, gait without antalgia and without foot drop, there is evidence of Heberden's nodes especially in the index DIP joint, no christin swan-neck or boutonniere deformities, no ulnar deviation, no flexure contractures in the elbows, knees with crepitus bilaterally but no effusions, no Achilles tendon inflammation, there is evidence of sausage digits on most fingers as well as tenderness palpation on the MCP joints bilaterally, there is some tenderness palpation along the MTP joints but no christin effusions  Lab Results   Component Value Date/Time    QUANTIFERON TB GOLD Negative 06/29/2016 02:03 PM     Lab Results   Component Value Date/Time    HEPATITIS B CORS AB,IGM Negative  06/29/2016 02:03 PM    HEPATITIS B SURFACE ANTIGEN Negative 06/29/2016 02:03 PM     Lab Results   Component Value Date/Time    SODIUM 139 12/01/2016 11:18 AM    POTASSIUM 4.0 12/01/2016 11:18 AM    CHLORIDE 103 12/01/2016 11:18 AM    CO2 29 12/01/2016 11:18 AM    GLUCOSE 76 12/01/2016 11:18 AM    BUN 30* 12/01/2016 11:18 AM    CREATININE 0.74 12/01/2016 11:18 AM      Lab Results   Component Value Date/Time    WBC 5.0 12/01/2016 11:18 AM    RBC 4.40 12/01/2016 11:18 AM    HEMOGLOBIN 13.5 12/01/2016 11:18 AM    HEMATOCRIT 42.3 12/01/2016 11:18 AM    MCV 96.1 12/01/2016 11:18 AM    MCH 30.7 12/01/2016 11:18 AM    MCHC 31.9* 12/01/2016 11:18 AM    MPV 12.0 12/01/2016 11:18 AM    NEUTROPHILS-POLYS 63.50 12/01/2016 11:18 AM    LYMPHOCYTES 26.10 12/01/2016 11:18 AM    MONOCYTES 8.00 12/01/2016 11:18 AM    EOSINOPHILS 1.60 12/01/2016 11:18 AM    BASOPHILS 0.60 12/01/2016 11:18 AM      Lab Results   Component Value Date/Time    CALCIUM 9.0 12/01/2016 11:18 AM    AST(SGOT) 22 12/01/2016 11:18 AM    ALT(SGPT) 22 12/01/2016 11:18 AM    ALKALINE PHOSPHATASE 51 12/01/2016 11:18 AM    TOTAL BILIRUBIN 0.8 12/01/2016 11:18 AM    ALBUMIN 3.8 12/01/2016 11:18 AM    TOTAL PROTEIN 6.6 12/01/2016 11:18 AM     Lab Results   Component Value Date/Time    URIC ACID 4.5 06/29/2016 02:03 PM    RHEUMATOID FACTOR -NEPH- <10 06/29/2016 02:03 PM    CCP ANTIBODIES 4 06/29/2016 02:03 PM     Lab Results   Component Value Date/Time    SED RATE WESTERGREN 11 12/01/2016 11:18 AM     Lab Results   Component Value Date/Time    GLYCOHEMOGLOBIN 5.5 05/16/2016 07:50 AM     Lab Results   Component Value Date/Time    CPK TOTAL 61 06/29/2016 02:03 PM     Results for orders placed during the hospital encounter of 03/18/16   DX-JOINT SURVEY-HANDS SINGLE VIEW    Impression Multiple bilateral sites of osteoarthritis     Results for orders placed during the hospital encounter of 03/18/16   DX-JOINT SURVEY-FEET SINGLE VIEW    Impression 1.  Bilateral midfoot and  forefoot osteoarthritis    2.  Bilateral bunion    3.  Prior fusion across the right 2nd proximal interphalangeal joint    4.  Foreign body or opaque material between 1st and 2nd phalanges     Results for orders placed during the hospital encounter of 03/18/16   DS-BONE DENSITY STUDY (DEXA)    Impression According to the World Health Organization classification, bone mineral density of this patient is osteopenia with increased risk of fracture.        10-year Probability of Fracture:  Major Osteoporotic     14.3%  Hip     3.7%  Population      [place] ()    Based on left femur neck BMD          INTERPRETING LOCATION:  [address] [place] [address], [id number]     Results for orders placed during the hospital encounter of 01/14/09   DX-KNEE COMPLETE 4+    Impression IMPRESSION:     MILD PATELLOFEMORAL AND MEDIAL FEMOROTIBIAL COMPARTMENT DEGENERATIVE   OSTEOARTHROSIS.        [facility]     Read By ALEX WISE MD on Jan 14 2009 10:01AM  : DANA Transcription Date: Jovi 15 2009  8:11AM  THIS DOCUMENT HAS BEEN ELECTRONICALLY SIGNED BY: ALEX WISE MD on   Jan 16 2009  1:32PM        Results for orders placed during the hospital encounter of 01/14/09   DX-SHOULDER 2+    Impression IMPRESSION:     NORMAL RADIOGRAPHS OF THE LEFT SHOULDER WITH NOTE MADE OF MINIMAL   DEGENERATIVE OSTEOARTHROSIS OF THE GLENOHUMERAL JOINT WITH MINIMAL   SPURRING.           Results for orders placed in visit on 03/18/14   MR-LUMBAR SPINE-W/O    Impression 1.  There is partial sacralization of the fifth lumbar vertebra. For the purpose of this report, the partially sacralized fifth lumbar vertebra is considered as L5.  2.  Mild degenerative disease as described above.  3.  There has been no significant interval change.     Results for orders placed during the hospital encounter of 01/14/09   DX-CERVICAL SPINE-2 OR 3 VIEWS    Impression IMPRESSION:     DEGENERATIVE DISC AND FACET ARTHROPATHY C5-6, C6-7, AND DEGENERATIVE   FACET  ARTHROPATHY AT C7-T1.           Assessment and Plan:     1. Rheumatoid arthritis, involving unspecified site, unspecified rheumatoid factor presence (HCC)  Continue methotrexate at 15 mg by mouth every week, we will start Humira injection today, teaching appointment today, patient's Humira should arrive in one week. Will have patient follow-up in about 2 months to evaluate efficacy of the Humira.  Labs to be done every 3 months while on methotrexate.    2. Long term current use of anticoagulants with INR goal of 2.0-3.0  This will impact with kind of medications we can use for this patient's arthritis, NSAIDs are contraindicated because of increased risk of bleeding while on chronic anticoagulation    3. Atrial fibrillation, unspecified type (HCC)  Patient on chronic anticoagulation for such  This will impact with kind of medications we can use for this patient's arthritis, NSAIDs are contraindicated because of increased risk of bleeding while on chronic anticoagulation    4. Essential hypertension, benign  May impact the type of medications we can use for this patient's arthritis. We will have to keep this under advisement.    5. Hyperglycemia  High blood sugar can exacerbate inflammatory state of patient's arthritis, discussed with patient the need to monitor and control blood sugars, patient states understanding    6. Oteopenia  Last DEXA March 2016, next DEXA March 2018  recommend to continue calcium about 1200 mg by mouth daily, vitamin D about 1000 units by mouth daily and magnesium 400 mg by mouth daily.    Followup: Return in about 2 months (around 3/12/2017). or sooner prn    Patient was seen 30 minutes face-to-face of which more than 50% of the time was spent counseling the patient (excluding time for procedures)  regarding  rheumatological condition and care. Therapy was discussed in detail.    Please note that this dictation was created using voice recognition software. I have made every reasonable  attempt to correct obvious errors, but I expect that there are errors of grammar and possibly content that I did not discover before finalizing the note.

## 2022-08-25 ENCOUNTER — OFFICE VISIT (OUTPATIENT)
Dept: PHYSICAL MEDICINE AND REHAB | Facility: MEDICAL CENTER | Age: 87
End: 2022-08-25
Payer: MEDICARE

## 2022-08-25 VITALS
SYSTOLIC BLOOD PRESSURE: 124 MMHG | WEIGHT: 156.09 LBS | HEART RATE: 71 BPM | TEMPERATURE: 98.1 F | OXYGEN SATURATION: 96 % | HEIGHT: 63 IN | BODY MASS INDEX: 27.66 KG/M2 | DIASTOLIC BLOOD PRESSURE: 74 MMHG

## 2022-08-25 DIAGNOSIS — C43.9 METASTATIC MELANOMA (HCC): ICD-10-CM

## 2022-08-25 DIAGNOSIS — M48.061 SPINAL STENOSIS OF LUMBAR REGION, UNSPECIFIED WHETHER NEUROGENIC CLAUDICATION PRESENT: ICD-10-CM

## 2022-08-25 DIAGNOSIS — M54.16 LUMBAR RADICULOPATHY: Primary | ICD-10-CM

## 2022-08-25 DIAGNOSIS — R20.0 NUMBNESS AND TINGLING OF RIGHT LEG: ICD-10-CM

## 2022-08-25 DIAGNOSIS — G62.9 NEUROPATHY: ICD-10-CM

## 2022-08-25 DIAGNOSIS — M70.61 GREATER TROCHANTERIC BURSITIS OF RIGHT HIP: ICD-10-CM

## 2022-08-25 DIAGNOSIS — M54.41 CHRONIC BILATERAL LOW BACK PAIN WITH RIGHT-SIDED SCIATICA: ICD-10-CM

## 2022-08-25 DIAGNOSIS — M05.79 RHEUMATOID ARTHRITIS INVOLVING MULTIPLE SITES WITH POSITIVE RHEUMATOID FACTOR (HCC): ICD-10-CM

## 2022-08-25 DIAGNOSIS — M54.16 LUMBAR RADICULITIS: ICD-10-CM

## 2022-08-25 DIAGNOSIS — M47.816 LUMBAR SPONDYLOSIS: ICD-10-CM

## 2022-08-25 DIAGNOSIS — R20.2 NUMBNESS AND TINGLING OF RIGHT LEG: ICD-10-CM

## 2022-08-25 DIAGNOSIS — G89.29 CHRONIC BILATERAL LOW BACK PAIN WITH RIGHT-SIDED SCIATICA: ICD-10-CM

## 2022-08-25 DIAGNOSIS — M79.10 MYALGIA: ICD-10-CM

## 2022-08-25 DIAGNOSIS — M53.3 SACROILIAC JOINT DYSFUNCTION OF RIGHT SIDE: ICD-10-CM

## 2022-08-25 PROCEDURE — 20611 DRAIN/INJ JOINT/BURSA W/US: CPT | Mod: RT | Performed by: STUDENT IN AN ORGANIZED HEALTH CARE EDUCATION/TRAINING PROGRAM

## 2022-08-25 RX ORDER — DEXAMETHASONE SODIUM PHOSPHATE 4 MG/ML
4 INJECTION, SOLUTION INTRA-ARTICULAR; INTRALESIONAL; INTRAMUSCULAR; INTRAVENOUS; SOFT TISSUE ONCE
Status: COMPLETED | OUTPATIENT
Start: 2022-08-25 | End: 2022-08-25

## 2022-08-25 RX ADMIN — DEXAMETHASONE SODIUM PHOSPHATE 4 MG: 4 INJECTION, SOLUTION INTRA-ARTICULAR; INTRALESIONAL; INTRAMUSCULAR; INTRAVENOUS; SOFT TISSUE at 12:04

## 2022-08-25 ASSESSMENT — FIBROSIS 4 INDEX: FIB4 SCORE: 1.54

## 2022-08-25 ASSESSMENT — PATIENT HEALTH QUESTIONNAIRE - PHQ9: CLINICAL INTERPRETATION OF PHQ2 SCORE: 0

## 2022-08-25 ASSESSMENT — PAIN SCALES - GENERAL: PAINLEVEL: 7=MODERATE-SEVERE PAIN

## 2022-08-25 NOTE — PROCEDURES
Patient Name: Marry Mathur  : 1935  Date of Service: 2022    Physician/s: Indira Lee MD    Pre-operative Diagnosis: right greater trochanteric bursitis    Post-operative Diagnosis: right greater trochanteric bursitis    Procedure: right greater trochanteric bursa injection ultrasound-guided    Description of procedure:    The risks, benefits, and alternatives of the procedure were reviewed and discussed with the patient.  Written informed consent was freely obtained. A pre-procedural time-out was conducted by the physician verifying patient’s identity, procedure to be performed, procedure site and side, and allergy verification. Appropriate equipment was determined to be in place for the procedure.     No sedation was used for this procedure.     In the office suite the patient was placed in a lateral position with the right side up, and the skin was prepped and draped in the usual sterile fashion. The ultrasound probe was placed over the right greater trochanter and the target for injection was marked. A 25g 3.5 inch needle was placed into skin and advanced under ultrasound guidance into the greater trochanteric bursa. Following negative aspiration, approx 3mL of 1% lidocaine with 1mL of 4mg/mL dexamethasone was then injected into the bursa, and the needle was subsequently removed intact after being restyleted. The patient's hip was wiped with a 4x4 gauze, the area was cleansed with alcohol prep, and a bandaid was applied. There were no complications noted. The images were saved for permanent storage.     Indira Lee MD  Interventional Pain and Spine  Physical Medicine and Rehabilitation  [facility]

## 2022-09-06 ENCOUNTER — HOSPITAL ENCOUNTER (OUTPATIENT)
Dept: RADIOLOGY | Facility: MEDICAL CENTER | Age: 87
End: 2022-09-06
Attending: NEUROLOGICAL SURGERY
Payer: MEDICARE

## 2022-09-06 ENCOUNTER — HOSPITAL ENCOUNTER (OUTPATIENT)
Dept: RADIOLOGY | Facility: MEDICAL CENTER | Age: 87
End: 2022-09-06
Attending: STUDENT IN AN ORGANIZED HEALTH CARE EDUCATION/TRAINING PROGRAM
Payer: MEDICARE

## 2022-09-06 DIAGNOSIS — M54.16 LUMBAR RADICULOPATHY: ICD-10-CM

## 2022-09-06 DIAGNOSIS — M48.061 SPINAL STENOSIS OF LUMBAR REGION, UNSPECIFIED WHETHER NEUROGENIC CLAUDICATION PRESENT: ICD-10-CM

## 2022-09-06 DIAGNOSIS — G89.29 CHRONIC BILATERAL LOW BACK PAIN WITH RIGHT-SIDED SCIATICA: ICD-10-CM

## 2022-09-06 DIAGNOSIS — M54.16 LUMBAR RADICULITIS: ICD-10-CM

## 2022-09-06 DIAGNOSIS — C43.9 METASTATIC MELANOMA (HCC): ICD-10-CM

## 2022-09-06 DIAGNOSIS — R20.2 NUMBNESS AND TINGLING OF RIGHT LEG: ICD-10-CM

## 2022-09-06 DIAGNOSIS — M47.816 LUMBAR SPONDYLOSIS: ICD-10-CM

## 2022-09-06 DIAGNOSIS — R20.0 NUMBNESS AND TINGLING OF RIGHT LEG: ICD-10-CM

## 2022-09-06 DIAGNOSIS — M54.50 LOW BACK PAIN, UNSPECIFIED BACK PAIN LATERALITY, UNSPECIFIED CHRONICITY, UNSPECIFIED WHETHER SCIATICA PRESENT: ICD-10-CM

## 2022-09-06 DIAGNOSIS — M54.41 CHRONIC BILATERAL LOW BACK PAIN WITH RIGHT-SIDED SCIATICA: ICD-10-CM

## 2022-09-06 PROCEDURE — 72110 X-RAY EXAM L-2 SPINE 4/>VWS: CPT

## 2022-09-06 PROCEDURE — 72148 MRI LUMBAR SPINE W/O DYE: CPT

## 2022-09-07 ENCOUNTER — PATIENT MESSAGE (OUTPATIENT)
Dept: PHYSICAL MEDICINE AND REHAB | Facility: MEDICAL CENTER | Age: 87
End: 2022-09-07
Payer: MEDICARE

## 2022-09-08 ENCOUNTER — OFFICE VISIT (OUTPATIENT)
Dept: PHYSICAL MEDICINE AND REHAB | Facility: MEDICAL CENTER | Age: 87
End: 2022-09-08
Payer: MEDICARE

## 2022-09-08 VITALS
SYSTOLIC BLOOD PRESSURE: 164 MMHG | DIASTOLIC BLOOD PRESSURE: 120 MMHG | BODY MASS INDEX: 27.42 KG/M2 | TEMPERATURE: 96.8 F | OXYGEN SATURATION: 97 % | WEIGHT: 154.76 LBS | HEART RATE: 75 BPM | HEIGHT: 63 IN

## 2022-09-08 DIAGNOSIS — M53.3 SACROILIAC JOINT DYSFUNCTION OF RIGHT SIDE: ICD-10-CM

## 2022-09-08 DIAGNOSIS — R52 ACUTE PAIN: ICD-10-CM

## 2022-09-08 DIAGNOSIS — R20.0 NUMBNESS AND TINGLING OF RIGHT LEG: ICD-10-CM

## 2022-09-08 DIAGNOSIS — M54.16 LUMBAR RADICULITIS: ICD-10-CM

## 2022-09-08 DIAGNOSIS — R20.2 NUMBNESS AND TINGLING OF RIGHT LEG: ICD-10-CM

## 2022-09-08 DIAGNOSIS — C43.9 METASTATIC MELANOMA (HCC): ICD-10-CM

## 2022-09-08 DIAGNOSIS — M47.816 LUMBAR SPONDYLOSIS: ICD-10-CM

## 2022-09-08 DIAGNOSIS — G89.29 CHRONIC BILATERAL LOW BACK PAIN WITH RIGHT-SIDED SCIATICA: ICD-10-CM

## 2022-09-08 DIAGNOSIS — M05.79 RHEUMATOID ARTHRITIS INVOLVING MULTIPLE SITES WITH POSITIVE RHEUMATOID FACTOR (HCC): ICD-10-CM

## 2022-09-08 DIAGNOSIS — M54.41 CHRONIC BILATERAL LOW BACK PAIN WITH RIGHT-SIDED SCIATICA: ICD-10-CM

## 2022-09-08 DIAGNOSIS — M53.3 SACRAL PAIN: ICD-10-CM

## 2022-09-08 DIAGNOSIS — M79.10 MYALGIA: ICD-10-CM

## 2022-09-08 DIAGNOSIS — M48.061 SPINAL STENOSIS OF LUMBAR REGION, UNSPECIFIED WHETHER NEUROGENIC CLAUDICATION PRESENT: ICD-10-CM

## 2022-09-08 DIAGNOSIS — M70.61 GREATER TROCHANTERIC BURSITIS OF RIGHT HIP: ICD-10-CM

## 2022-09-08 DIAGNOSIS — M54.16 LUMBAR RADICULOPATHY: ICD-10-CM

## 2022-09-08 DIAGNOSIS — G62.9 NEUROPATHY: ICD-10-CM

## 2022-09-08 PROCEDURE — 99215 OFFICE O/P EST HI 40 MIN: CPT | Performed by: STUDENT IN AN ORGANIZED HEALTH CARE EDUCATION/TRAINING PROGRAM

## 2022-09-08 RX ORDER — TRAMADOL HYDROCHLORIDE 50 MG/1
50 TABLET ORAL EVERY 8 HOURS PRN
Qty: 90 TABLET | Refills: 0 | Status: ON HOLD | OUTPATIENT
Start: 2022-09-08 | End: 2022-09-23

## 2022-09-08 RX ORDER — NALOXONE HYDROCHLORIDE 4 MG/.1ML
1 SPRAY NASAL PRN
Qty: 1 EACH | Refills: 1 | Status: SHIPPED | OUTPATIENT
Start: 2022-09-08 | End: 2022-09-16

## 2022-09-08 RX ORDER — CAPSAICIN 0.025 %
1 CREAM (GRAM) TOPICAL 3 TIMES DAILY
Qty: 60 G | Refills: 2 | Status: SHIPPED | OUTPATIENT
Start: 2022-09-08 | End: 2022-09-16

## 2022-09-08 ASSESSMENT — FIBROSIS 4 INDEX: FIB4 SCORE: 1.56

## 2022-09-08 ASSESSMENT — PAIN SCALES - GENERAL: PAINLEVEL: 10=SEVERE PAIN

## 2022-09-08 NOTE — PROGRESS NOTES
Follow-up patient Note    Interventional Pain and Spine  Physiatry (Physical Medicine and Rehabilitation)     Patient Name: Marry Mathur  : 1935  Date of Service: 2022      Chief Complaint:   Chief Complaint   Patient presents with    Follow-Up     Lumbar radiculopathy         HISTORY 3/17/22  Marry Mathur is a 86 y.o. female who presents today with constant low back pain and burning lateral right leg pain that started a couple of years ago with no known inciting event. States she has seen Dr. Cooper for multiple years, most recently 4 months ago, but feels that her pain continues to worsen so she seeks to reestablish care with a new pain provider.     Her pain at its best-worse level during the course of the day is 4-9/10, respectively. Pain right now is 7/10 on the numeric pain scale. Pain worsens with standing and walking and improves slightly with rest and sitting. Her pain interferes somewhat with ADLs. Pain interferes with her ability to work in her yard.  The patient denies new weakness, numbness, or bladder/bowel incontinence. Denies pain in her left leg. Endorses history of neuropathy in feet with no known inciting event. Had an EMG done with Dr. Booth which revealed a left peroneal axonal mononeuropathy per chart. Has hx of RA, feels that her RA symptoms are stable.     Notes that she is able to ambulate more easily by bending forward and using a shopping cart. Notes that propping her legs in a recliner improves her pain.     On xarelto. Seeing Dr. Rodney. Pt states she has been able to stop xarelto for procedures in the past.     The patient is currently going to physical therapy for this problem, most recently last week. Endorses improvement with dry needling lasting 12-24h.     Patient has tried the following medications with varied success (current meds in bold):   Gabapentin 300mg QHS  Tylenol 500mg BID     Also taking fish oil     Therapeutic modalities and  interventional therapies to date include:  -multiple per chart. Pt states the most helpful were bilateral 2-level facet joint injections with helped for a few months  - Lumbar epidural  - pt notes she has had multiple other injections that are not viewable in her [facility] chart     Medical history includes osteoporosis, protein S deficiency, left TKA, left CARLYLE, RA, HLD, HTN, GERD, DVT     Denies hx of back surgery.    Notes from outside provider [facility] reviewed. Indicating:  - L3-4 ILESI on 20  - right L3-4 ILESI on 10/29/2020 - 30% pain relief  - bilateral L4-5 and L5-S1 facet joint injections 12/15/2020 - 50-70% pain relief  - right L3-4 ILESI on 21 - 50% pain relief  - right L2-L4 MBB on 21 with significant pain relief, right L2-L4 MBB on 9/3/21 with significant pain relief, right L2-L4 RFA 10/15/21  One note mentions resolution of leg pain after procedure targeting the facet    HPI  Today's visit   Marry Mathur ( 1935) is a female with Diagnoses of Acute pain, Sacral pain, Myalgia, Spinal stenosis of lumbar region, unspecified whether neurogenic claudication present, Sacroiliac joint dysfunction of right side, Lumbar spondylosis, Chronic bilateral low back pain with right-sided sciatica, Rheumatoid arthritis involving multiple sites with positive rheumatoid factor (HCC), Neuropathy, Numbness and tingling of right leg, Lumbar radiculitis, Greater trochanteric bursitis of right hip, Metastatic melanoma (HCC), and Lumbar radiculopathy were pertinent to this visit.    Presents today for MRI and XR review.  Today she reports severe pain worst at her midline sacrum that does not radiate.  It feels burning.  It is slightly improved with a topical lidocaine patch and worsens with lumbar extension. She states this started on Monday, 2022 with no known inciting event.  Denies trauma.  Feels that this is located lower down than her typical area of pain which was  "located in her low back.  Also reports ongoing numbness/burning pain at her lateral right calf.    Pain continues to limit her ability to perform ADLs and garden. She endorses increased difficulty walking. She was previously able to walk for about 20-30 minutes before needing to take a break.  Uses a shopping cart helps with walking endurance.    She denies any relief from trigger point injections or right GTB injection.  She is frustrated due to her pain.      Continues to get immunotherapy for metastatic melanoma with Dr. Easley.  No records from Dr. Easley available for my review at the time of today's visit.    States she had consulted with Dr. Bonner a few years ago who discussed surgery but she wanted to avoid surgery at that time.  Has an upcoming appointment with him as well as an appointment with Dr. De La Rosa.    She continues to take gabapentin and Tylenol as needed.  She denies noticeable relief with tizanidine 2 mg nightly. Denies side effects.  Pt denies new numbness, tingling, or weakness.    Procedure history:  - 5/10/22 right Lumbar L4-5 interlaminar epidural steroid injection under fluoroscopic guidance - 10-15% pain relief x 2-3 days  - 5/31/22 right L4-L5 and L5-S1 transforaminal epidural steroid injection - initial 25-30% improvement in pain for 1.5 weeks, then worsening pain.   - 7/12/22 - R SIJ injection - 24 hr significant relief  - 8/12/22 trigger point injections - no significant relief  - 8/25/22 R GTB injection - no significant relief     ROS:   Red Flags ROS:   Fever, Chills, Sweats: Denies  Involuntary Weight Loss: Denies  Bladder Incontinence: Denies  Bowel Incontinence: denies  Saddle Anesthesia: Denies    All other systems reviewed and negative.     PMHx:   Past Medical History:   Diagnosis Date    Adenocarcinoma of colon (HCC) 10/30/2018    From sessile serrated polyp 10/2018    Anesthesia     pt states \"one new dr scraped my esophagus when they put the tube in and I started bleeding so " "they couldn't operate\"     Atrial fibrillation [I48.91] 04/19/2016     pt denies     Back pain 06/01/2022    0/10    Blood clotting disorder (HCC) 2012    clot in leg    Bowel habit changes     constipation     Cancer (HCC)     skin, colon 2018    Cataract     belia IOL     Chronic back pain greater than 3 months duration     Deep vein thrombosis (HCC) 03/08/2016    First occurrence in [facility] in late 1970s Second occurrence further up in [facility] in 2012, has been on AC since     Essential hypertension, benign 06/27/2012    GERD (gastroesophageal reflux disease) 06/27/2012    Heart murmur     Hyperlipidemia     Hypertension     hx of, not currently     Impaired fasting glucose 11/02/2017    Melanoma (HCC)     Mild aortic stenosis     Seeing Dr. Van    Other and unspecified hyperlipidemia 06/27/2012    Prediabetes     Protein S deficiency (HCC) 11/02/2017    Noted in lab work 2013 as a part of work up at [facility]     Rheumatoid arthritis involving multiple sites with positive rheumatoid factor ([facility]) 03/14/2016    Dr. Escoto    Rheumatoid nodule ([facility]) 07/25/2017    Right bundle branch block 06/27/2012    pt denies     Urinary incontinence 11/02/2017       PSHx:   Past Surgical History:   Procedure Laterality Date    NJ INJECTION,SACROILIAC JOINT Right 7/12/2022    Procedure: RIGHT sacroiliac joint injection with fluoroscopic guidance.;  Surgeon: Indira Lee M.D.;  Location: SURGERY [facility];  Service: Pain Management    NJ REMOVE ARMPITS LYMPH NODES COMPLT Left 6/7/2022    Procedure: LYMPHADENECTOMY, AXILLARY;  Surgeon: Bernardino Torres M.D.;  Location: SURGERY SAME DAY [facility];  Service: General    BLOCK EPIDURAL STEROID INJECTION Right 5/31/2022    Procedure: RIGHT L4-5 and L5-S1 transforaminal epidural steroid injection with fluoroscopic guidance.;  Surgeon: Indira Lee M.D.;  Location: SURGERY REHAB PAIN MANAGEMENT;  Service: Pain Management    BLOCK EPIDURAL STEROID INJECTION Right 05/10/2022 "    Procedure: Lumbar L4-5 interlaminar epidural steroid injection;  Surgeon: Indira Lee M.D.;  Location: SURGERY REHAB PAIN MANAGEMENT;  Service: Pain Management    WIDE EXCISION, LESION, HEAD AND NECK REGION Left 03/29/2022    Procedure: WIDE EXCISION, LESION, HEAD AND NECK REGION - RADICAL MALIGNANT MELANOMA OF LEFT CHEST;  Surgeon: Bernardino Torres M.D.;  Location: SURGERY SAME DAY [facility];  Service: General    LUMBAR MEDIAL BRANCH BLOCKS Bilateral 12/15/2020    Procedure: BLOCK, NERVE, SPINAL, LUMBAR, POSTERIOR RAMUS, MEDIAL BRANCH;  Surgeon: Chris Cooper M.D.;  Location: SURGERY REHAB PAIN MANAGEMENT;  Service: Pain Management    IRRIGATION & DEBRIDEMENT ORTHO Bilateral 07/31/2020    Procedure: IRRIGATION AND DEBRIDEMENT, WOUND - KNEE;  Surgeon: Roosevelt Saucedo M.D.;  Location: SURGERY [facility];  Service: Orthopedics    HEMICOLECTOMY Right 12/13/2018    Procedure: OPEN RIGHT HEMICOLECTOMY;  Surgeon: Dash Bhagat M.D.;  Location: SURGERY [facility];  Service: General    INGUINAL HERNIA REPAIR Right 09/23/2016    Procedure: INGUINAL HERNIA REPAIR - PRIMARY;  Surgeon: Mary Medina M.D.;  Location: SURGERY [facility];  Service:     OTHER  08/12/2014    peroneal nerve surgery Dr. Castro     OTHER ORTHOPEDIC SURGERY  2011    meniscus repair    ARTHROPLASTY Left     hip replacement    CHOLECYSTECTOMY      HYSTERECTOMY, TOTAL ABDOMINAL  1970's    KNEE ARTHROPLASTY TOTAL Left     ROTATOR CUFF REPAIR Bilateral        Family Hx:   Family History   Problem Relation Age of Onset    Cancer Mother         stomach    Cancer Father         colon    Leukemia Father         many exposure, worked for [employer]     Heart Disease Brother         s/p stent        Social Hx:  Social History     Socioeconomic History    Marital status:      Spouse name: Not on file    Number of children: Not on file    Years of education: Not on file    Highest education level: Not on file   Occupational  "History    Not on file   Tobacco Use    Smoking status: Never    Smokeless tobacco: Never   Vaping Use    Vaping Use: Never used   Substance and Sexual Activity    Alcohol use: Yes     Comment: very rare    Drug use: Not Currently    Sexual activity: Not Currently     Partners: Male     Comment:     Other Topics Concern    Not on file   Social History Narrative    Retired from [employer] [place]. 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   Outpatient Medications Marked as Taking for the 9/8/22 encounter (Office Visit) with Indira Lee M.D.   Medication Sig Dispense Refill    capsaicin (ZOSTRIX) 0.025 % cream Apply 1 Application topically 3 times a day. 60 g 2    traMADol (ULTRAM) 50 MG Tab Take 1 Tablet by mouth every 8 hours as needed for Moderate Pain or Severe Pain for up to 30 days. 90 Tablet 0    melatonin 5 mg Tab Take 10 mg by mouth every evening.      NON SPECIFIED LLD. Right 1/2\"  Limb length difference. 1 Each 0    rivaroxaban (XARELTO) 20 MG Tab tablet Take 1 Tablet by mouth with dinner. 90 Tablet 1    Multiple Vitamins-Minerals (PRESERVISION AREDS PO) Take  by mouth.      nivolumab (OPDIVO) 100 MG/10ML Solution Infuse  into a venous catheter.      tizanidine (ZANAFLEX) 2 MG tablet Take 0.5-1 Tablets by mouth at bedtime as needed (muscle spasms). 30 Tablet 2    lidocaine (LIDODERM) 5 % Patch APPLY ONE PATCH TO CLEAN DRY SKIN AS DIRECTED DAILY 90 Patch 1    gabapentin (NEURONTIN) 300 MG Cap Take 2 " "Capsules by mouth at bedtime. 180 Capsule 2    atorvastatin (LIPITOR) 10 MG Tab Take 1 Tablet by mouth every evening. 90 Tablet 1    Calcium Carbonate (CALCIUM 600 PO) Take 300 mg by mouth.      SODIUM FLUORIDE 5000 PLUS 1.1 % Cream BRUSH WITH PEA SIZED AMOUNT EVERY DAY PREFERABLY BEFORE BEDTIME. SPIT OUT ALL EXCESS. DO NOT RINSE      omeprazole (PRILOSEC) 20 MG delayed-release capsule Take 1 Capsule by mouth every day. 90 Capsule 3    denosumab (PROLIA) 60 MG/ML Solution Prefilled Syringe injection Inject 60 mg under the skin every 6 months.      acetaminophen (TYLENOL) 500 MG Tab Take 500 mg by mouth 2 times a day.      vitamin D (CHOLECALCIFEROL) 1000 UNIT Tab Take 500 Units by mouth every morning.          Current Outpatient Medications on File Prior to Visit   Medication Sig Dispense Refill    melatonin 5 mg Tab Take 10 mg by mouth every evening.      NON SPECIFIED LLD. Right 1/2\"  Limb length difference. 1 Each 0    rivaroxaban (XARELTO) 20 MG Tab tablet Take 1 Tablet by mouth with dinner. 90 Tablet 1    Multiple Vitamins-Minerals (PRESERVISION AREDS PO) Take  by mouth.      nivolumab (OPDIVO) 100 MG/10ML Solution Infuse  into a venous catheter.      tizanidine (ZANAFLEX) 2 MG tablet Take 0.5-1 Tablets by mouth at bedtime as needed (muscle spasms). 30 Tablet 2    lidocaine (LIDODERM) 5 % Patch APPLY ONE PATCH TO CLEAN DRY SKIN AS DIRECTED DAILY 90 Patch 1    gabapentin (NEURONTIN) 300 MG Cap Take 2 Capsules by mouth at bedtime. 180 Capsule 2    atorvastatin (LIPITOR) 10 MG Tab Take 1 Tablet by mouth every evening. 90 Tablet 1    Calcium Carbonate (CALCIUM 600 PO) Take 300 mg by mouth.      SODIUM FLUORIDE 5000 PLUS 1.1 % Cream BRUSH WITH PEA SIZED AMOUNT EVERY DAY PREFERABLY BEFORE BEDTIME. SPIT OUT ALL EXCESS. DO NOT RINSE      omeprazole (PRILOSEC) 20 MG delayed-release capsule Take 1 Capsule by mouth every day. 90 Capsule 3    denosumab (PROLIA) 60 MG/ML Solution Prefilled Syringe injection Inject 60 mg " "under the skin every 6 months.      acetaminophen (TYLENOL) 500 MG Tab Take 500 mg by mouth 2 times a day.      vitamin D (CHOLECALCIFEROL) 1000 UNIT Tab Take 500 Units by mouth every morning.       No current facility-administered medications on file prior to visit.         EXAMINATION     Physical Exam:   BP (!) 164/120 (BP Location: Right arm, Patient Position: Sitting, BP Cuff Size: Adult)   Pulse 75   Temp 36 °C (96.8 °F) (Temporal)   Ht 1.6 m (5' 3\")   Wt 70.2 kg (154 lb 12.2 oz)   SpO2 97%     BP improved to 144/94 at the conclusion of today's visit    Constitutional:   Body Habitus: Body mass index is 27.42 kg/m².  Cooperation: Fully cooperates with exam  Appearance: Well-groomed, well-nourished.    Eyes: No scleral icterus to suggest severe liver disease, no proptosis to suggest severe hyperthyroidism    ENT -no obvious auditory deficits, no noticeable facial droop     Skin -no rashes or lesions noted     Respiratory-  breathing comfortably on room air, no audible wheezing    Cardiovascular-distal extremities warm and well perfused.  No lower extremity edema is noted.     Gastrointestinal - no obvious abdominal masses, non-distended    Psychiatric- alert and oriented ×3. Normal affect.     Gait - antalgic gait favoring right leg. Uses cane for balance.     Musculoskeletal and Neuro -      Thoracic/Lumbar Spine/Sacral Spine/Hips   Inspection: No evidence of atrophy in bilateral lower extremities throughout      Provocative exam testing deferred today per patient request due to apprehension of worsening pain    Hyperalgesia to light touch at midline sacrum which is the patient's worst area of pain.  No significant tenderness to palpation at her bilateral lumbar facets, lumbar paraspinal muscles, midline lumbar spine.    Key points for the international standards for neurological classification of spinal cord injury (ISNCSCI) to light touch.   Dermatome R L   L2 2 2   L3 2 2   L4 1 2   L5 1 2   S1 1 1 "   S2 2 2         Motor Exam Lower Extremities  ? Myotome R L   Hip flexion L2 5 5   Knee extension L3 5 5   Ankle dorsiflexion L4 5 5   Toe extension L5 5 5   Ankle plantarflexion S1 5 5         Previous exam    ROM: limited active range of motion with lumbar flexion, lateral flexion, and rotation bilaterally.   There is limited active range of motion with lumbar extension     Facet loading maneuver positive on left, neg on right     Palpation:   Tenderness to palpation over the midline of lumbosacral spine, paraspinal muscles bilaterally, right greater trochanter, lumbar facets bilaterally and right sacroiliac area. No tenderness to palpation elsewhere in the low back/hips including sacroiliac joints on left, PSIS bilaterally and greater trochanters bilaterally.     Lumbar spine /hip provocative exam maneuvers  Straight leg raise  negative bilaterally  FADIR test negative bilaterally     SI joint tests  JENNIFER test negative bilaterally  SI joint compression negative bilaterally  SI joint distraction negative bilaterally  Sacral thrust test negative bilaterally  Thigh thrust test negative bilaterally  Yeomans maneuver positive on right, negative on left  James finger sign positive on right, negative on left    Bilateral hip abductor 4-/5    Thigh thrust test negative bilaterally  SI joint compression negative bilaterally  SI joint distraction negative bilaterally  Sacral thrust test negative bilaterally  Gaenslens maneuver negative bilaterally      Reflexes  ?   R L   Patella   2+ 2+   Achilles    2+ 2+      Clonus of the ankle negative bilaterally       MEDICAL DECISION MAKING    Medical records review: see under HPI section.     DATA    Labs: Personally reviewed at today's visit    Lab Results   Component Value Date/Time    SODIUM 133 (L) 06/01/2022 11:55 AM    POTASSIUM 4.2 06/01/2022 11:55 AM    CHLORIDE 97 06/01/2022 11:55 AM    CO2 24 06/01/2022 11:55 AM    ANION 12.0 06/01/2022 11:55 AM    GLUCOSE 98  06/01/2022 11:55 AM    BUN 26 (H) 06/01/2022 11:55 AM    CREATININE 0.77 06/01/2022 11:55 AM    CALCIUM 8.9 06/01/2022 11:55 AM    ASTSGOT 17 06/01/2022 11:55 AM    ALTSGPT 21 06/01/2022 11:55 AM    TBILIRUBIN 0.6 06/01/2022 11:55 AM    ALBUMIN 4.4 06/01/2022 11:55 AM    TOTPROTEIN 6.9 06/01/2022 11:55 AM    GLOBULIN 2.5 06/01/2022 11:55 AM    AGRATIO 1.8 06/01/2022 11:55 AM       Lab Results   Component Value Date/Time    PROTHROMBTM 15.2 (H) 03/22/2022 03:25 PM    INR 1.23 (H) 03/22/2022 03:25 PM        Lab Results   Component Value Date/Time    WBC 9.1 06/01/2022 11:55 AM    RBC 4.32 06/01/2022 11:55 AM    HEMOGLOBIN 13.6 06/01/2022 11:55 AM    HEMATOCRIT 40.8 06/01/2022 11:55 AM    MCV 94.4 06/01/2022 11:55 AM    MCH 31.5 06/01/2022 11:55 AM    MCHC 33.3 (L) 06/01/2022 11:55 AM    MPV 11.0 06/01/2022 11:55 AM    NEUTSPOLYS 78.10 (H) 06/01/2022 11:55 AM    LYMPHOCYTES 11.30 (L) 06/01/2022 11:55 AM    MONOCYTES 8.70 06/01/2022 11:55 AM    EOSINOPHILS 1.20 06/01/2022 11:55 AM    BASOPHILS 0.30 06/01/2022 11:55 AM        Lab Results   Component Value Date/Time    HBA1C 5.5 05/16/2016 07:50 AM      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  Disc bulges at T12-L1, L1-2, L2-3, L4-5, and L5-S1.  Moderate bilateral neuroforaminal stenosis at L1 -2.  Mild bilateral neuroforaminal stenosis at L2-3.  Moderate bilateral neuroforaminal stenosis at L3-4.  Mild bilateral neuroforaminal stenosis at L4-5.  Mild bilateral neuroforaminal stenosis at L5-S1.  Severe central canal stenosis at L3-4. Severe bilateral facet arthropathy worst at bilateral L4-5 and moderate at L2-3 and L3-4. Mild facet arthropathy at bilateral L5-S1. See formal radiology report for further details.    XR lumbar spine 9/6/22  Grade 1 anterolisthesis of L4 on L5.  Disc space narrowing at L5-S1.    MRI lumbar spine  3/31/22  Moderate-severe central stenosis at L3-L4 with moderate neuroforaminal stenosis bilaterally. Mild bilatearl neuroforaminal stenosis at L4-L5 and L5-S1. Facet arthropathy worst at bilateral L3-L4, L4-L5, and L5-S1. Grade 1 anterolisthesis of L4 on L5.     MRI lumbar spine 5/26/20  Severe bilateral facet arthropathy at L4-L5. Moderate-severe facet arthropathy at bilateral L3-L4. Grade 1 anterolisthesis of L4 on L5. Mod-severe right neuroforaminal stenosis at L3-L4, mild left neuroforaminal stenosis at L3-L4. Moderate central stenosis at L3-4.                 IMAGING radiology reads. I reviewed the following radiology reads   MRI lumbar spine 09/06/22     FINDINGS:  Vertebral body height is well maintained. There is mild anterior subluxation at L4-5. There is mild retrolisthesis at T12-L1 and L1-L2.  No evidence of marrow edema or fracture.  There is multilevel loss of the normal disc height and bright T2 disc signal.  No spinal canal masses seen.  The conus is located normally at L1-2.        Findings at specific levels:     There are disc bulges and there is facet degeneration at T10-11 and T11-12. There is some slight underlying cord contact at T11-12. No central canal narrowing. There is mild right-sided neural foraminal narrowing at T11-12.     T12-L1: There is annular disc bulge and bilateral facet degeneration. There is a central disc protrusion. There is mild central canal narrowing. There is moderate bilateral neural foraminal narrowing.     L1-2: There is annular disc bulge with a central disc protrusion and bilateral facet degeneration. There is borderline central canal narrowing. There is moderate bilateral neural foraminal narrowing.     L2-3: There is annular disc bulge and bilateral facet degeneration with a right paracentral disc protrusion. There is no central canal narrowing. There is mild bilateral neural foraminal narrowing.     L3-4: There is annular disc bulge with a large central disc  protrusion and bilateral facet degeneration. There is severe central canal narrowing. This is mildly progressed from comparison. There is moderate bilateral neural foraminal narrowing.     L4-5: There is annular disc bulge and bilateral facet degeneration. There is borderline central canal narrowing. There is mild bilateral neuroforaminal narrowing.     L5-S1: There is posterior osseous spurring and intervening disc bulge with a central disc protrusion and bilateral facet degeneration. No central canal narrowing. There is mild bilateral neural foraminal narrowing.     XR lumbar spine 09/06/22  FINDINGS:     Grade 1 anterolisthesis of L4 on L5 is noted of 11 mm. Disc space narrowing is noted at L5-S1. Multilevel facet arthrosis is noted. There is no abnormal translation identified on flexion or extension views. There is no acute fracture. Left hip   arthroplasty is noted. 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     IMPRESSION:        Grade 1 anterolisthesis of L4 on L5. Multilevel lumbar spondylosis. No acute fracture or radiographic evidence of instability.      Results for orders placed during the hospital encounter of 03/31/22    MR-LUMBAR SPINE-W/O    Impression  Interval worsening L3/4 central protrusion results in worsening moderate to severe central canal and moderate left foraminal stenoses    Mild worsening T12/L1 degenerative disc disease resulting in new mild central stenosis    Otherwise stable multilevel degenerative change resulting in foraminal predominate stenoses as detailed above    Stable L4/5 grade 1 anterolisthesis of secondary to severe facet arthropathy. Stable minimal degenerative retrolisthesis of the upper lumbar levels    Mature L5/S1 interbody fusion                 Results for orders placed during the hospital encounter of 05/26/20     MR-LUMBAR SPINE-W/O     Impression  Interval worsening L3/4 central protrusion results in worsening moderate central  stenosis     Otherwise stable multilevel degenerative change resulting in foraminal predominate stenoses, greatest is moderate to severe on the right at L3/4.     Lesser stenoses at other levels as detailed above     Stable L4/5 grade 1 anterolisthesis of secondary to severe facet arthropathy. Stable minimal degenerative retrolisthesis of the upper lumbar levels     Mature L5/S1 interbody fusion      Results for orders placed during the hospital encounter of 11/01/16     DX-LUMBAR SPINE-2 OR 3 VIEWS     Impression  1.  There has been progression of mild diffuse degenerative disc disease and facet disease throughout the entire lumbar spine.  2.  There are degenerative areas of anterolisthesis and retrolisthesis as noted above.      Results for orders placed during the hospital encounter of 11/17/20     DX-LUMBAR SPINE-4+ VIEWS     Impression  1.  No compression deformity or acute fracture is identified.     2.  There is grade 2 anterolisthesis now identified at L4-L5 which is significantly increased compared to the prior exam. There appears to be L4 spondylolysis.     3.  Some interval increase in degenerative disc disease and facet arthropathy.     4.  No focal instability noted on flexion extension views.         Diagnosis  Visit Diagnoses     ICD-10-CM   1. Acute pain  R52   2. Sacral pain  M53.3   3. Myalgia  M79.10   4. Spinal stenosis of lumbar region, unspecified whether neurogenic claudication present  M48.061   5. Sacroiliac joint dysfunction of right side  M53.3   6. Lumbar spondylosis  M47.816   7. Chronic bilateral low back pain with right-sided sciatica  M54.41    G89.29   8. Rheumatoid arthritis involving multiple sites with positive rheumatoid factor (HCC)  M05.79   9. Neuropathy  G62.9   10. Numbness and tingling of right leg  R20.0    R20.2   11. Lumbar radiculitis  M54.16   12. Greater trochanteric bursitis of right hip  M70.61   13. Metastatic melanoma ([facility])  C79.9   14. Lumbar radiculopathy   M54.16           ASSESSMENT AND PLAN:  Marry Mathur (: 1935) is a female with history of osteoporosis, protein S deficiency, left TKA, left CARLYLE, RA, HLD, HTN, GERD, DVT on chronic Xarelto, and neuropathy in bilateral feet who presents with acute on chronic burning pain located today at her midline sacrum that started with no inciting event on Monday. This appears to be located much lower than her bilateral lumbar facet joints.  She does not have any significant tenderness to palpation at her bilateral lumbar facet joints today.    Currently being treated for metastatic melanoma. Has not had imaging of the area of her new acute pain.    Also reports ongoing right low back pain, pain at her right lateral hip corresponding to her right greater trochanter, and burning pain at her lateral right calf.    Possibly etiology lumbar radiculopathy 2/2 neuroforaminal stenosis at right L3-L4 and right L4-L5 on MRI 3/31/22. Her worst pain is in her right low back and her right leg in the right L4 and L5 distribution.  Only partial slight relief with ILESI at right L4-L5, and right L4-L5 and L5-S1 transforaminal epidural steroid injection    She has had a positive right-sided lumbar facet loading maneuver suggestive of lumbar facetogenic pain    She endorses worsening pain with lumbar extension improved with lumbar flexion. Denies left leg symptoms. Lumbar spinal stenosis with neurogenic claudication is in the differential     Marry was seen today for follow-up.    Diagnoses and all orders for this visit:    Acute pain  -     Controlled Substance Treatment Agreement  -     traMADol (ULTRAM) 50 MG Tab; Take 1 Tablet by mouth every 8 hours as needed for Moderate Pain or Severe Pain for up to 30 days.    Sacral pain  -     traMADol (ULTRAM) 50 MG Tab; Take 1 Tablet by mouth every 8 hours as needed for Moderate Pain or Severe Pain for up to 30 days.    Myalgia    Spinal stenosis of lumbar region, unspecified whether  neurogenic claudication present    Sacroiliac joint dysfunction of right side    Lumbar spondylosis    Chronic bilateral low back pain with right-sided sciatica    Rheumatoid arthritis involving multiple sites with positive rheumatoid factor (HCC)    Neuropathy    Numbness and tingling of right leg    Lumbar radiculitis    Greater trochanteric bursitis of right hip    Metastatic melanoma (HCC)    Lumbar radiculopathy    Other orders  -     capsaicin (ZOSTRIX) 0.025 % cream; Apply 1 Application topically 3 times a day.          PLAN  Physical Therapy: Okay to continue PT as tolerated     Diagnostic workup: MRI lumbar spine 9/6/22 and XR lumbar spine 9/6/22  -Discussed that I am considering ordering updated imaging of her sacrum to evaluate for possible metastasis at this area given her worst area of pain at this location that started on Tuesday with no known inciting event.  I plan to discuss this first with Dr. Easley, as I do not have any records from him or of her management of metastatic melanoma available for my review at this time.     Medications:  - start acute course of tramadol 50mg TID PRN for severe acute pain worst at midline sacrum. CSA signed today.  - start trial of capsaicin cream for burning pain at midline sacrum  -  continue gabapentin 600mg QHS.   - NSAIDs contraindicated due to being on chronic anticoagulation for protein S deficiency and hx of DVT    Last controlled substance agreement: 09/08/22   reviewed: 9/8/2022    Naloxone prescribed: yes.                    Opioid Risk Score: 0     Interpretation of Opioid Risk Score   Score 0-3 = Low risk of abuse. Do UDS at least once per year.  Score 4-7 = Moderate risk of abuse. Do UDS 1-4 times per year.  Score 8+ = High risk of abuse. Refer to specialist.      I reviewed the      In prescribing controlled substances to this patient, I certify that I have obtained and reviewed the medical history of Juliethgloria Tavera. I have also made a  good christen effort to obtain applicable records from other providers who have treated the patient and records did not demonstrate any increased risk of substance abuse that would prevent me from prescribing controlled substances.      I have conducted a physical exam and documented it. I have reviewed Ms. Tavera's prescription history as maintained by the [place] Prescription Monitoring Program.      I have assessed the patient's risk for abuse, dependency, and addiction using the validated Opioid Risk Tool available at https://www.mdcalc.com/gnibzr-rqwd-pibu-ort-narcotic-abuse.      Given the above, I believe the benefits of controlled substance therapy outweigh the risks. The reasons for prescribing controlled substances include non-narcotic, oral analgesic alternatives have been inadequate for pain control. Accordingly, I have discussed the risk and benefits, treatment plan, and alternative therapies with the patient.        Interventions:  -Discussed that we could consider spinal cord stimulator in the future if pain persists, pending surgical evaluation and discussion with Dr. Easley first  - s/p trigger point injections with no significant improvement in pain  - s/p R GTB injection with no significant improvement in pain  - s/p right sacroiliac joint injection with 1 day of significant pain improvement  - s/p right L4-L5 and L5-S1 transforaminal epidural steroid injection.   - s/p right Lumbar L4-5 interlaminar epidural steroid injection under fluoroscopic guidance.   - requesting pt hold Xarelto for 3 days prior and restart 24 hours after - previously received clearance    Other  - Today I contacted the patient's oncologist Dr. Easley to discuss patient's case. Unfortunately he wasn't available and his  did not want to leave a message. I plan to call back tomorrow  - the patient reports she will be seeing Dr. De La Rosa and Dr. Bonner in the next month.     Follow-up: 1 month or sooner as needed.  Plan to  contact patient if any further management is needed prior to follow-up, pending my discussion with Dr. Kaushal Lee MD  Interventional Pain and Spine  Physical Medicine and Rehabilitation  [facility]    The above note documents my personal evaluation of this patient. In addition, I have reviewed and confirmed with the patient and MA the supportive information documented in today's Patient Health Questionnaire and Office Note.     Total time: 48 minutes. I spent greater than 50% of the time for patient care and coordination on this date, including face-to-face time with the patient as per assessment and plan above.     Please note that this dictation was created using voice recognition software. I have made every reasonable attempt to correct obvious errors, but I expect that there are errors of grammar and possibly content that I did not discover before finalizing the note.

## 2022-09-09 DIAGNOSIS — R52 ACUTE PAIN: ICD-10-CM

## 2022-09-09 DIAGNOSIS — M53.3 SACRAL PAIN: Primary | ICD-10-CM

## 2022-09-09 NOTE — PROGRESS NOTES
I discussed Ms. Evans's case today with her oncologist Dr. Easley.  Dr. Easley is currently treating her metastatic melanoma and does not suspect any bony metastases at this time.  He states that he spoke with her rheumatologist who felt that her arthritis was less likely to have an autoimmune component at this time, and more likely to be osteoarthritis in nature.  However her immunotherapy does have a possible side effect of flaring autoimmune related pain.  For now, given that her pain seems less autoimmune in nature, we will assess for non-autoimmune causes for her pain.    Together we elected to order an MRI pelvis to assess for other causes of the patient's pain, including sacral insufficiency fracture.    I will message the patient to share this information.    Indira Lee MD  Interventional Pain and Spine  Physical Medicine and Rehabilitation  [facility]    09/09/22  2:35 PM

## 2022-09-12 ENCOUNTER — PATIENT MESSAGE (OUTPATIENT)
Dept: CARDIOLOGY | Facility: MEDICAL CENTER | Age: 87
End: 2022-09-12
Payer: MEDICARE

## 2022-09-12 ENCOUNTER — TELEPHONE (OUTPATIENT)
Dept: CARDIOLOGY | Facility: MEDICAL CENTER | Age: 87
End: 2022-09-12
Payer: MEDICARE

## 2022-09-13 NOTE — TELEPHONE ENCOUNTER
----- Message from Nikki Martinez Med Ass't sent at 9/12/2022  4:30 PM PDT -----  Letter/referral faxed to Dr. Serrano's office at [phone].    ----- Message -----  From: Darron Van M.D.  Sent: 9/12/2022  12:55 PM PDT  To: Cintia Puente R.N., #    Please send a letter stating that she needs to see Dr. Serrano for assessment of her aortic valve prior to an elective surgery. Further recommendations will depend on his evaluation of her valve severity.    ----- Message -----  From: Nikki Martinez Med Ass't  Sent: 9/12/2022  11:03 AM PDT  To: Darron Van M.D.

## 2022-09-13 NOTE — TELEPHONE ENCOUNTER
Medical Clearance (BACK SURGERY)  Haseeb Serrano M.D.  You 47 minutes ago (3:47 PM)     In absence of any new cardiac symptoms (fatigue, SOB, heart failure, chest pain, syncope) she can proceed without additional preoperative testing and hold Xarelto as planned. Given the aortic valve condition she is at moderate risk for cardiovascular complications during the anticipated moderate risk surgery.  I advised this procedure be done in a hospital setting with appropriate anesthesia support-cardiac anesthesia if available.     Thanks   BE        Letter drafted with MD recommendations, electronically sent to pt via [employer], and electronically faxed to [phone] completed status see below.  Replied to pt via [employer] regarding findings.

## 2022-09-13 NOTE — TELEPHONE ENCOUNTER
To BE, per BN advised pt is aortic valve to evaluated by you to risk stratify for upcoming elective surgery with JUSTIN.  FV scheduled with you 10/14, no sooner openings.  Please advise, thank you

## 2022-09-13 NOTE — TELEPHONE ENCOUNTER
Caller: Marry    Topic/issue: Pt called and wanted to speak to BE or his nurse about her surgical clearance she's trying to get for her back surgery. She states that she is in to much pain to wait that long. Please see this message and the [employer] message. This call was transferred over to me to leave a message as they tried finding a sooner BE appt, but noting until 10-.    Callback Number: [phone]

## 2022-09-13 NOTE — TELEPHONE ENCOUNTER
----- Message from Darron Van M.D. sent at 9/12/2022 12:54 PM PDT -----  Please send a letter stating that she needs to see Dr. Serrano for assessment of her aortic valve prior to an elective surgery. Further recommendations will depend on his evaluation of her valve severity.    ----- Message -----  From: Trent Townsend Ass't  Sent: 9/12/2022  11:03 AM PDT  To: Darron Van M.D.

## 2022-09-14 ENCOUNTER — TELEPHONE (OUTPATIENT)
Dept: NEUROSURGERY | Facility: MEDICAL CENTER | Age: 87
End: 2022-09-14
Payer: MEDICARE

## 2022-09-14 PROBLEM — M43.16 SPONDYLOLISTHESIS, LUMBAR REGION: Status: ACTIVE | Noted: 2022-09-14

## 2022-09-14 RX ORDER — ENOXAPARIN SODIUM 100 MG/ML
40 INJECTION SUBCUTANEOUS DAILY
Qty: 21 EACH | Refills: 0 | Status: ON HOLD | OUTPATIENT
Start: 2022-09-14 | End: 2022-10-06

## 2022-09-14 NOTE — TELEPHONE ENCOUNTER
Reviewed bridging guidelines with patient. To stop xarelto today, last dose next Monday. Then 2 weeks after surgery. She has tramadol at home.

## 2022-09-16 ENCOUNTER — PRE-ADMISSION TESTING (OUTPATIENT)
Dept: ADMISSIONS | Facility: MEDICAL CENTER | Age: 87
DRG: 454 | End: 2022-09-16
Attending: NEUROLOGICAL SURGERY
Payer: MEDICARE

## 2022-09-16 ENCOUNTER — HOSPITAL ENCOUNTER (OUTPATIENT)
Dept: RADIOLOGY | Facility: MEDICAL CENTER | Age: 87
DRG: 454 | End: 2022-09-16
Attending: NEUROLOGICAL SURGERY | Admitting: NEUROLOGICAL SURGERY
Payer: MEDICARE

## 2022-09-16 DIAGNOSIS — Z01.812 PRE-OPERATIVE LABORATORY EXAMINATION: ICD-10-CM

## 2022-09-16 DIAGNOSIS — Z01.811 PRE-OPERATIVE RESPIRATORY EXAMINATION: ICD-10-CM

## 2022-09-16 DIAGNOSIS — Z01.810 PRE-OPERATIVE CARDIOVASCULAR EXAMINATION: ICD-10-CM

## 2022-09-16 LAB
25(OH)D3 SERPL-MCNC: 54 NG/ML (ref 30–100)
ABO GROUP BLD: NORMAL
ANION GAP SERPL CALC-SCNC: 8 MMOL/L (ref 7–16)
APPEARANCE UR: CLEAR
APTT PPP: 29.6 SEC (ref 24.7–36)
BACTERIA #/AREA URNS HPF: NEGATIVE /HPF
BASOPHILS # BLD AUTO: 0.7 % (ref 0–1.8)
BASOPHILS # BLD: 0.04 K/UL (ref 0–0.12)
BILIRUB UR QL STRIP.AUTO: NEGATIVE
BLD GP AB SCN SERPL QL: NORMAL
BUN SERPL-MCNC: 20 MG/DL (ref 8–22)
CALCIUM SERPL-MCNC: 8.7 MG/DL (ref 8.5–10.5)
CHLORIDE SERPL-SCNC: 101 MMOL/L (ref 96–112)
CO2 SERPL-SCNC: 28 MMOL/L (ref 20–33)
COLOR UR: YELLOW
CREAT SERPL-MCNC: 0.68 MG/DL (ref 0.5–1.4)
EKG IMPRESSION: NORMAL
EOSINOPHIL # BLD AUTO: 0.17 K/UL (ref 0–0.51)
EOSINOPHIL NFR BLD: 3.1 % (ref 0–6.9)
EPI CELLS #/AREA URNS HPF: NEGATIVE /HPF
ERYTHROCYTE [DISTWIDTH] IN BLOOD BY AUTOMATED COUNT: 45.2 FL (ref 35.9–50)
EST. AVERAGE GLUCOSE BLD GHB EST-MCNC: 114 MG/DL
GFR SERPLBLD CREATININE-BSD FMLA CKD-EPI: 85 ML/MIN/1.73 M 2
GLUCOSE SERPL-MCNC: 105 MG/DL (ref 65–99)
GLUCOSE UR STRIP.AUTO-MCNC: NEGATIVE MG/DL
HBA1C MFR BLD: 5.6 % (ref 4–5.6)
HCT VFR BLD AUTO: 42.9 % (ref 37–47)
HGB BLD-MCNC: 14.2 G/DL (ref 12–16)
HYALINE CASTS #/AREA URNS LPF: NORMAL /LPF
IMM GRANULOCYTES # BLD AUTO: 0.02 K/UL (ref 0–0.11)
IMM GRANULOCYTES NFR BLD AUTO: 0.4 % (ref 0–0.9)
INR PPP: 1.1 (ref 0.87–1.13)
KETONES UR STRIP.AUTO-MCNC: NEGATIVE MG/DL
LEUKOCYTE ESTERASE UR QL STRIP.AUTO: ABNORMAL
LYMPHOCYTES # BLD AUTO: 0.98 K/UL (ref 1–4.8)
LYMPHOCYTES NFR BLD: 17.9 % (ref 22–41)
MCH RBC QN AUTO: 31.4 PG (ref 27–33)
MCHC RBC AUTO-ENTMCNC: 33.1 G/DL (ref 33.6–35)
MCV RBC AUTO: 94.9 FL (ref 81.4–97.8)
MICRO URNS: ABNORMAL
MONOCYTES # BLD AUTO: 0.44 K/UL (ref 0–0.85)
MONOCYTES NFR BLD AUTO: 8 % (ref 0–13.4)
NEUTROPHILS # BLD AUTO: 3.83 K/UL (ref 2–7.15)
NEUTROPHILS NFR BLD: 69.9 % (ref 44–72)
NITRITE UR QL STRIP.AUTO: NEGATIVE
NRBC # BLD AUTO: 0 K/UL
NRBC BLD-RTO: 0 /100 WBC
PH UR STRIP.AUTO: 6 [PH] (ref 5–8)
PLATELET # BLD AUTO: 205 K/UL (ref 164–446)
PMV BLD AUTO: 11.4 FL (ref 9–12.9)
POTASSIUM SERPL-SCNC: 4.1 MMOL/L (ref 3.6–5.5)
PROT UR QL STRIP: NEGATIVE MG/DL
PROTHROMBIN TIME: 14.1 SEC (ref 12–14.6)
RBC # BLD AUTO: 4.52 M/UL (ref 4.2–5.4)
RBC # URNS HPF: NORMAL /HPF
RBC UR QL AUTO: NEGATIVE
RH BLD: NORMAL
SODIUM SERPL-SCNC: 137 MMOL/L (ref 135–145)
SP GR UR STRIP.AUTO: 1.02
UROBILINOGEN UR STRIP.AUTO-MCNC: 0.2 MG/DL
WBC # BLD AUTO: 5.5 K/UL (ref 4.8–10.8)
WBC #/AREA URNS HPF: NORMAL /HPF

## 2022-09-16 PROCEDURE — 93005 ELECTROCARDIOGRAM TRACING: CPT

## 2022-09-16 PROCEDURE — 82306 VITAMIN D 25 HYDROXY: CPT

## 2022-09-16 PROCEDURE — 80048 BASIC METABOLIC PNL TOTAL CA: CPT

## 2022-09-16 PROCEDURE — 85025 COMPLETE CBC W/AUTO DIFF WBC: CPT

## 2022-09-16 PROCEDURE — 86900 BLOOD TYPING SEROLOGIC ABO: CPT

## 2022-09-16 PROCEDURE — 93010 ELECTROCARDIOGRAM REPORT: CPT | Performed by: INTERNAL MEDICINE

## 2022-09-16 PROCEDURE — 71046 X-RAY EXAM CHEST 2 VIEWS: CPT

## 2022-09-16 PROCEDURE — 86850 RBC ANTIBODY SCREEN: CPT

## 2022-09-16 PROCEDURE — 86901 BLOOD TYPING SEROLOGIC RH(D): CPT

## 2022-09-16 PROCEDURE — 85730 THROMBOPLASTIN TIME PARTIAL: CPT

## 2022-09-16 PROCEDURE — 81001 URINALYSIS AUTO W/SCOPE: CPT

## 2022-09-16 PROCEDURE — 85610 PROTHROMBIN TIME: CPT

## 2022-09-16 PROCEDURE — 36415 COLL VENOUS BLD VENIPUNCTURE: CPT

## 2022-09-16 PROCEDURE — 83036 HEMOGLOBIN GLYCOSYLATED A1C: CPT

## 2022-09-16 ASSESSMENT — FIBROSIS 4 INDEX: FIB4 SCORE: 1.56

## 2022-09-21 ENCOUNTER — ANESTHESIA EVENT (OUTPATIENT)
Dept: SURGERY | Facility: MEDICAL CENTER | Age: 87
DRG: 454 | End: 2022-09-21
Payer: MEDICARE

## 2022-09-21 ENCOUNTER — APPOINTMENT (OUTPATIENT)
Dept: RADIOLOGY | Facility: MEDICAL CENTER | Age: 87
DRG: 454 | End: 2022-09-21
Attending: NEUROLOGICAL SURGERY
Payer: MEDICARE

## 2022-09-21 ENCOUNTER — HOSPITAL ENCOUNTER (INPATIENT)
Facility: MEDICAL CENTER | Age: 87
LOS: 3 days | DRG: 454 | End: 2022-09-24
Attending: NEUROLOGICAL SURGERY | Admitting: NEUROLOGICAL SURGERY
Payer: MEDICARE

## 2022-09-21 ENCOUNTER — ANESTHESIA (OUTPATIENT)
Dept: SURGERY | Facility: MEDICAL CENTER | Age: 87
DRG: 454 | End: 2022-09-21
Payer: MEDICARE

## 2022-09-21 DIAGNOSIS — M43.16 SPONDYLOLISTHESIS, LUMBAR REGION: ICD-10-CM

## 2022-09-21 DIAGNOSIS — G89.18 POSTOPERATIVE PAIN AFTER SPINAL SURGERY: ICD-10-CM

## 2022-09-21 PROBLEM — M48.062 LUMBAR STENOSIS WITH NEUROGENIC CLAUDICATION: Status: ACTIVE | Noted: 2022-09-21

## 2022-09-21 PROCEDURE — 95940 IONM IN OPERATNG ROOM 15 MIN: CPT | Performed by: NEUROLOGICAL SURGERY

## 2022-09-21 PROCEDURE — 160042 HCHG SURGERY MINUTES - EA ADDL 1 MIN LEVEL 5: Performed by: NEUROLOGICAL SURGERY

## 2022-09-21 PROCEDURE — 700106 HCHG RX REV CODE 271: Performed by: NEUROLOGICAL SURGERY

## 2022-09-21 PROCEDURE — 22853 INSJ BIOMECHANICAL DEVICE: CPT | Performed by: NEUROLOGICAL SURGERY

## 2022-09-21 PROCEDURE — 700101 HCHG RX REV CODE 250: Performed by: NEUROLOGICAL SURGERY

## 2022-09-21 PROCEDURE — 00NY0ZZ RELEASE LUMBAR SPINAL CORD, OPEN APPROACH: ICD-10-PCS | Performed by: NEUROLOGICAL SURGERY

## 2022-09-21 PROCEDURE — A9270 NON-COVERED ITEM OR SERVICE: HCPCS | Performed by: PHYSICIAN ASSISTANT

## 2022-09-21 PROCEDURE — 700102 HCHG RX REV CODE 250 W/ 637 OVERRIDE(OP): Performed by: PHYSICIAN ASSISTANT

## 2022-09-21 PROCEDURE — 20930 SP BONE ALGRFT MORSEL ADD-ON: CPT | Performed by: NEUROLOGICAL SURGERY

## 2022-09-21 PROCEDURE — L8699 PROSTHETIC IMPLANT NOS: HCPCS | Performed by: NEUROLOGICAL SURGERY

## 2022-09-21 PROCEDURE — 63052 LAM FACETC/FRMT ARTHRD LUM 1: CPT | Mod: ASROC,59 | Performed by: PHYSICIAN ASSISTANT

## 2022-09-21 PROCEDURE — A4306 DRUG DELIVERY SYSTEM <=50 ML: HCPCS | Performed by: NEUROLOGICAL SURGERY

## 2022-09-21 PROCEDURE — 22853 INSJ BIOMECHANICAL DEVICE: CPT | Mod: ASROC | Performed by: PHYSICIAN ASSISTANT

## 2022-09-21 PROCEDURE — 63047 LAM FACETEC & FORAMOT LUMBAR: CPT | Mod: 59 | Performed by: NEUROLOGICAL SURGERY

## 2022-09-21 PROCEDURE — 700105 HCHG RX REV CODE 258: Performed by: NEUROLOGICAL SURGERY

## 2022-09-21 PROCEDURE — 502000 HCHG MISC OR IMPLANTS RC 0278: Performed by: NEUROLOGICAL SURGERY

## 2022-09-21 PROCEDURE — 00670 ANES XTNSV SP&SPI CORD PX: CPT | Performed by: ANESTHESIOLOGY

## 2022-09-21 PROCEDURE — 8968 PR NO CHARGE - PROCEDURE: Mod: ASROC | Performed by: PHYSICIAN ASSISTANT

## 2022-09-21 PROCEDURE — A9270 NON-COVERED ITEM OR SERVICE: HCPCS | Performed by: ANESTHESIOLOGY

## 2022-09-21 PROCEDURE — 63052 LAM FACETC/FRMT ARTHRD LUM 1: CPT | Mod: 59 | Performed by: NEUROLOGICAL SURGERY

## 2022-09-21 PROCEDURE — 110371 HCHG SHELL REV 272: Performed by: NEUROLOGICAL SURGERY

## 2022-09-21 PROCEDURE — 63047 LAM FACETEC & FORAMOT LUMBAR: CPT | Mod: ASROC,59 | Performed by: PHYSICIAN ASSISTANT

## 2022-09-21 PROCEDURE — 99100 ANES PT EXTEME AGE<1 YR&>70: CPT | Performed by: ANESTHESIOLOGY

## 2022-09-21 PROCEDURE — 63053 LAM FACTC/FRMT ARTHRD LUM EA: CPT | Mod: ASROC | Performed by: PHYSICIAN ASSISTANT

## 2022-09-21 PROCEDURE — 160048 HCHG OR STATISTICAL LEVEL 1-5: Performed by: NEUROLOGICAL SURGERY

## 2022-09-21 PROCEDURE — 160036 HCHG PACU - EA ADDL 30 MINS PHASE I: Performed by: NEUROLOGICAL SURGERY

## 2022-09-21 PROCEDURE — 95861 NEEDLE EMG 2 EXTREMITIES: CPT | Performed by: NEUROLOGICAL SURGERY

## 2022-09-21 PROCEDURE — 110454 HCHG SHELL REV 250: Performed by: NEUROLOGICAL SURGERY

## 2022-09-21 PROCEDURE — 700111 HCHG RX REV CODE 636 W/ 250 OVERRIDE (IP): Performed by: NEUROLOGICAL SURGERY

## 2022-09-21 PROCEDURE — 160035 HCHG PACU - 1ST 60 MINS PHASE I: Performed by: NEUROLOGICAL SURGERY

## 2022-09-21 PROCEDURE — 36620 INSERTION CATHETER ARTERY: CPT | Performed by: ANESTHESIOLOGY

## 2022-09-21 PROCEDURE — C1821 INTERSPINOUS IMPLANT: HCPCS | Performed by: NEUROLOGICAL SURGERY

## 2022-09-21 PROCEDURE — C1713 ANCHOR/SCREW BN/BN,TIS/BN: HCPCS | Performed by: NEUROLOGICAL SURGERY

## 2022-09-21 PROCEDURE — 8E0WXBZ COMPUTER ASSISTED PROCEDURE OF TRUNK REGION: ICD-10-PCS | Performed by: NEUROLOGICAL SURGERY

## 2022-09-21 PROCEDURE — 700101 HCHG RX REV CODE 250: Performed by: PHYSICIAN ASSISTANT

## 2022-09-21 PROCEDURE — 01NB0ZZ RELEASE LUMBAR NERVE, OPEN APPROACH: ICD-10-PCS | Performed by: NEUROLOGICAL SURGERY

## 2022-09-21 PROCEDURE — 99024 POSTOP FOLLOW-UP VISIT: CPT | Performed by: PHYSICIAN ASSISTANT

## 2022-09-21 PROCEDURE — 160002 HCHG RECOVERY MINUTES (STAT): Performed by: NEUROLOGICAL SURGERY

## 2022-09-21 PROCEDURE — 0SB20ZZ EXCISION OF LUMBAR VERTEBRAL DISC, OPEN APPROACH: ICD-10-PCS | Performed by: NEUROLOGICAL SURGERY

## 2022-09-21 PROCEDURE — 22633 ARTHRD CMBN 1NTRSPC LUMBAR: CPT | Mod: ASROC | Performed by: PHYSICIAN ASSISTANT

## 2022-09-21 PROCEDURE — 700105 HCHG RX REV CODE 258: Performed by: ANESTHESIOLOGY

## 2022-09-21 PROCEDURE — 0SG0071 FUSION OF LUMBAR VERTEBRAL JOINT WITH AUTOLOGOUS TISSUE SUBSTITUTE, POSTERIOR APPROACH, POSTERIOR COLUMN, OPEN APPROACH: ICD-10-PCS | Performed by: NEUROLOGICAL SURGERY

## 2022-09-21 PROCEDURE — 72100 X-RAY EXAM L-S SPINE 2/3 VWS: CPT

## 2022-09-21 PROCEDURE — 95938 SOMATOSENSORY TESTING: CPT | Performed by: NEUROLOGICAL SURGERY

## 2022-09-21 PROCEDURE — 700101 HCHG RX REV CODE 250: Performed by: ANESTHESIOLOGY

## 2022-09-21 PROCEDURE — 63053 LAM FACTC/FRMT ARTHRD LUM EA: CPT | Performed by: NEUROLOGICAL SURGERY

## 2022-09-21 PROCEDURE — 20936 SP BONE AGRFT LOCAL ADD-ON: CPT | Performed by: NEUROLOGICAL SURGERY

## 2022-09-21 PROCEDURE — 700111 HCHG RX REV CODE 636 W/ 250 OVERRIDE (IP): Performed by: PHYSICIAN ASSISTANT

## 2022-09-21 PROCEDURE — 95870 NDL EMG LMTD STD MUSC 1 XTR: CPT | Performed by: NEUROLOGICAL SURGERY

## 2022-09-21 PROCEDURE — 160009 HCHG ANES TIME/MIN: Performed by: NEUROLOGICAL SURGERY

## 2022-09-21 PROCEDURE — 700111 HCHG RX REV CODE 636 W/ 250 OVERRIDE (IP): Performed by: ANESTHESIOLOGY

## 2022-09-21 PROCEDURE — 22840 INSERT SPINE FIXATION DEVICE: CPT | Mod: ASROC | Performed by: PHYSICIAN ASSISTANT

## 2022-09-21 PROCEDURE — 770001 HCHG ROOM/CARE - MED/SURG/GYN PRIV*

## 2022-09-21 PROCEDURE — 22633 ARTHRD CMBN 1NTRSPC LUMBAR: CPT | Performed by: NEUROLOGICAL SURGERY

## 2022-09-21 PROCEDURE — 0SG00AJ FUSION OF LUMBAR VERTEBRAL JOINT WITH INTERBODY FUSION DEVICE, POSTERIOR APPROACH, ANTERIOR COLUMN, OPEN APPROACH: ICD-10-PCS | Performed by: NEUROLOGICAL SURGERY

## 2022-09-21 PROCEDURE — 22840 INSERT SPINE FIXATION DEVICE: CPT | Performed by: NEUROLOGICAL SURGERY

## 2022-09-21 PROCEDURE — 160031 HCHG SURGERY MINUTES - 1ST 30 MINS LEVEL 5: Performed by: NEUROLOGICAL SURGERY

## 2022-09-21 PROCEDURE — 700102 HCHG RX REV CODE 250 W/ 637 OVERRIDE(OP): Performed by: ANESTHESIOLOGY

## 2022-09-21 PROCEDURE — 95937 NEUROMUSCULAR JUNCTION TEST: CPT | Performed by: NEUROLOGICAL SURGERY

## 2022-09-21 DEVICE — GRAPH BONE KIT INFUSE LARGE II: Type: IMPLANTABLE DEVICE | Site: SPINE LUMBAR | Status: FUNCTIONAL

## 2022-09-21 DEVICE — MATRIX MASTERGRAFT 20CC: Type: IMPLANTABLE DEVICE | Site: SPINE LUMBAR | Status: FUNCTIONAL

## 2022-09-21 DEVICE — IMPLANTABLE DEVICE: Type: IMPLANTABLE DEVICE | Site: SPINE LUMBAR | Status: FUNCTIONAL

## 2022-09-21 RX ORDER — VANCOMYCIN HYDROCHLORIDE 1 G/20ML
INJECTION, POWDER, LYOPHILIZED, FOR SOLUTION INTRAVENOUS PRN
Status: DISCONTINUED | OUTPATIENT
Start: 2022-09-21 | End: 2022-09-21 | Stop reason: SURG

## 2022-09-21 RX ORDER — ATORVASTATIN CALCIUM 10 MG/1
10 TABLET, FILM COATED ORAL EVERY EVENING
Status: DISCONTINUED | OUTPATIENT
Start: 2022-09-21 | End: 2022-09-24 | Stop reason: HOSPADM

## 2022-09-21 RX ORDER — SODIUM CHLORIDE, SODIUM LACTATE, POTASSIUM CHLORIDE, CALCIUM CHLORIDE 600; 310; 30; 20 MG/100ML; MG/100ML; MG/100ML; MG/100ML
INJECTION, SOLUTION INTRAVENOUS CONTINUOUS
Status: ACTIVE | OUTPATIENT
Start: 2022-09-21 | End: 2022-09-21

## 2022-09-21 RX ORDER — DEXMEDETOMIDINE HYDROCHLORIDE 100 UG/ML
INJECTION, SOLUTION INTRAVENOUS PRN
Status: DISCONTINUED | OUTPATIENT
Start: 2022-09-21 | End: 2022-09-21 | Stop reason: SURG

## 2022-09-21 RX ORDER — AMOXICILLIN 250 MG
1 CAPSULE ORAL
Status: DISCONTINUED | OUTPATIENT
Start: 2022-09-21 | End: 2022-09-24 | Stop reason: HOSPADM

## 2022-09-21 RX ORDER — CEFAZOLIN SODIUM 1 G/3ML
2 INJECTION, POWDER, FOR SOLUTION INTRAMUSCULAR; INTRAVENOUS ONCE
Status: DISCONTINUED | OUTPATIENT
Start: 2022-09-21 | End: 2022-09-21 | Stop reason: HOSPADM

## 2022-09-21 RX ORDER — AMOXICILLIN 250 MG
1 CAPSULE ORAL NIGHTLY
Status: DISCONTINUED | OUTPATIENT
Start: 2022-09-21 | End: 2022-09-24 | Stop reason: HOSPADM

## 2022-09-21 RX ORDER — BISACODYL 10 MG
10 SUPPOSITORY, RECTAL RECTAL
Status: DISCONTINUED | OUTPATIENT
Start: 2022-09-21 | End: 2022-09-24 | Stop reason: HOSPADM

## 2022-09-21 RX ORDER — ONDANSETRON 4 MG/1
4 TABLET, ORALLY DISINTEGRATING ORAL EVERY 4 HOURS PRN
Status: DISCONTINUED | OUTPATIENT
Start: 2022-09-21 | End: 2022-09-24 | Stop reason: HOSPADM

## 2022-09-21 RX ORDER — CHOLECALCIFEROL (VITAMIN D3) 125 MCG
5 CAPSULE ORAL NIGHTLY
Status: DISCONTINUED | OUTPATIENT
Start: 2022-09-21 | End: 2022-09-24 | Stop reason: HOSPADM

## 2022-09-21 RX ORDER — OXYCODONE HCL 5 MG/5 ML
10 SOLUTION, ORAL ORAL
Status: COMPLETED | OUTPATIENT
Start: 2022-09-21 | End: 2022-09-21

## 2022-09-21 RX ORDER — ONDANSETRON 2 MG/ML
INJECTION INTRAMUSCULAR; INTRAVENOUS PRN
Status: DISCONTINUED | OUTPATIENT
Start: 2022-09-21 | End: 2022-09-21 | Stop reason: SURG

## 2022-09-21 RX ORDER — POLYETHYLENE GLYCOL 3350 17 G/17G
1 POWDER, FOR SOLUTION ORAL 2 TIMES DAILY PRN
Status: DISCONTINUED | OUTPATIENT
Start: 2022-09-21 | End: 2022-09-24 | Stop reason: HOSPADM

## 2022-09-21 RX ORDER — TIZANIDINE 4 MG/1
2 TABLET ORAL 3 TIMES DAILY PRN
Status: DISCONTINUED | OUTPATIENT
Start: 2022-09-21 | End: 2022-09-24 | Stop reason: HOSPADM

## 2022-09-21 RX ORDER — CEFAZOLIN SODIUM 1 G/3ML
INJECTION, POWDER, FOR SOLUTION INTRAMUSCULAR; INTRAVENOUS
Status: DISCONTINUED | OUTPATIENT
Start: 2022-09-21 | End: 2022-09-21 | Stop reason: HOSPADM

## 2022-09-21 RX ORDER — HYDROMORPHONE HYDROCHLORIDE 1 MG/ML
0.4 INJECTION, SOLUTION INTRAMUSCULAR; INTRAVENOUS; SUBCUTANEOUS
Status: DISCONTINUED | OUTPATIENT
Start: 2022-09-21 | End: 2022-09-21 | Stop reason: HOSPADM

## 2022-09-21 RX ORDER — ALPRAZOLAM 0.25 MG/1
0.25 TABLET ORAL 2 TIMES DAILY PRN
Status: DISCONTINUED | OUTPATIENT
Start: 2022-09-21 | End: 2022-09-24 | Stop reason: HOSPADM

## 2022-09-21 RX ORDER — MAGNESIUM SULFATE HEPTAHYDRATE 40 MG/ML
INJECTION, SOLUTION INTRAVENOUS PRN
Status: DISCONTINUED | OUTPATIENT
Start: 2022-09-21 | End: 2022-09-21 | Stop reason: SURG

## 2022-09-21 RX ORDER — HYDROMORPHONE HYDROCHLORIDE 1 MG/ML
0.1 INJECTION, SOLUTION INTRAMUSCULAR; INTRAVENOUS; SUBCUTANEOUS
Status: DISCONTINUED | OUTPATIENT
Start: 2022-09-21 | End: 2022-09-21 | Stop reason: HOSPADM

## 2022-09-21 RX ORDER — LABETALOL HYDROCHLORIDE 5 MG/ML
5 INJECTION, SOLUTION INTRAVENOUS
Status: DISCONTINUED | OUTPATIENT
Start: 2022-09-21 | End: 2022-09-21 | Stop reason: HOSPADM

## 2022-09-21 RX ORDER — POLYETHYLENE GLYCOL 3350 17 G/17G
1 POWDER, FOR SOLUTION ORAL DAILY
Status: DISCONTINUED | OUTPATIENT
Start: 2022-09-22 | End: 2022-09-24 | Stop reason: HOSPADM

## 2022-09-21 RX ORDER — OXYCODONE HCL 5 MG/5 ML
5 SOLUTION, ORAL ORAL
Status: COMPLETED | OUTPATIENT
Start: 2022-09-21 | End: 2022-09-21

## 2022-09-21 RX ORDER — LABETALOL HYDROCHLORIDE 5 MG/ML
10 INJECTION, SOLUTION INTRAVENOUS
Status: DISCONTINUED | OUTPATIENT
Start: 2022-09-21 | End: 2022-09-24 | Stop reason: HOSPADM

## 2022-09-21 RX ORDER — BUPIVACAINE HYDROCHLORIDE AND EPINEPHRINE 5; 5 MG/ML; UG/ML
INJECTION, SOLUTION EPIDURAL; INTRACAUDAL; PERINEURAL
Status: DISCONTINUED | OUTPATIENT
Start: 2022-09-21 | End: 2022-09-21 | Stop reason: HOSPADM

## 2022-09-21 RX ORDER — ACETAMINOPHEN 500 MG
1000 TABLET ORAL EVERY 6 HOURS
Status: DISCONTINUED | OUTPATIENT
Start: 2022-09-21 | End: 2022-09-24 | Stop reason: HOSPADM

## 2022-09-21 RX ORDER — DIPHENHYDRAMINE HYDROCHLORIDE 50 MG/ML
25 INJECTION INTRAMUSCULAR; INTRAVENOUS EVERY 6 HOURS PRN
Status: DISCONTINUED | OUTPATIENT
Start: 2022-09-21 | End: 2022-09-24 | Stop reason: HOSPADM

## 2022-09-21 RX ORDER — HYDRALAZINE HYDROCHLORIDE 20 MG/ML
5 INJECTION INTRAMUSCULAR; INTRAVENOUS
Status: DISCONTINUED | OUTPATIENT
Start: 2022-09-21 | End: 2022-09-21 | Stop reason: HOSPADM

## 2022-09-21 RX ORDER — ENEMA 19; 7 G/133ML; G/133ML
1 ENEMA RECTAL
Status: DISCONTINUED | OUTPATIENT
Start: 2022-09-21 | End: 2022-09-24 | Stop reason: HOSPADM

## 2022-09-21 RX ORDER — DIPHENHYDRAMINE HCL 25 MG
25 TABLET ORAL EVERY 6 HOURS PRN
Status: DISCONTINUED | OUTPATIENT
Start: 2022-09-21 | End: 2022-09-24 | Stop reason: HOSPADM

## 2022-09-21 RX ORDER — GABAPENTIN 300 MG/1
300 CAPSULE ORAL ONCE
Status: DISCONTINUED | OUTPATIENT
Start: 2022-09-21 | End: 2022-09-21 | Stop reason: HOSPADM

## 2022-09-21 RX ORDER — CEFAZOLIN SODIUM 2 G/100ML
2 INJECTION, SOLUTION INTRAVENOUS EVERY 8 HOURS
Status: COMPLETED | OUTPATIENT
Start: 2022-09-21 | End: 2022-09-22

## 2022-09-21 RX ORDER — HYDROMORPHONE HYDROCHLORIDE 1 MG/ML
0.2 INJECTION, SOLUTION INTRAMUSCULAR; INTRAVENOUS; SUBCUTANEOUS
Status: DISCONTINUED | OUTPATIENT
Start: 2022-09-21 | End: 2022-09-21 | Stop reason: HOSPADM

## 2022-09-21 RX ORDER — MORPHINE SULFATE 4 MG/ML
2 INJECTION INTRAVENOUS
Status: DISCONTINUED | OUTPATIENT
Start: 2022-09-21 | End: 2022-09-24 | Stop reason: HOSPADM

## 2022-09-21 RX ORDER — SODIUM CHLORIDE, SODIUM LACTATE, POTASSIUM CHLORIDE, CALCIUM CHLORIDE 600; 310; 30; 20 MG/100ML; MG/100ML; MG/100ML; MG/100ML
INJECTION, SOLUTION INTRAVENOUS CONTINUOUS
Status: DISCONTINUED | OUTPATIENT
Start: 2022-09-21 | End: 2022-09-21 | Stop reason: HOSPADM

## 2022-09-21 RX ORDER — SODIUM CHLORIDE, SODIUM LACTATE, POTASSIUM CHLORIDE, AND CALCIUM CHLORIDE .6; .31; .03; .02 G/100ML; G/100ML; G/100ML; G/100ML
IRRIGANT IRRIGATION
Status: DISCONTINUED | OUTPATIENT
Start: 2022-09-21 | End: 2022-09-21 | Stop reason: HOSPADM

## 2022-09-21 RX ORDER — MAGNESIUM HYDROXIDE 1200 MG/15ML
LIQUID ORAL
Status: COMPLETED | OUTPATIENT
Start: 2022-09-21 | End: 2022-09-21

## 2022-09-21 RX ORDER — OMEPRAZOLE 20 MG/1
20 CAPSULE, DELAYED RELEASE ORAL DAILY
Status: DISCONTINUED | OUTPATIENT
Start: 2022-09-22 | End: 2022-09-24 | Stop reason: HOSPADM

## 2022-09-21 RX ORDER — DEXAMETHASONE SODIUM PHOSPHATE 4 MG/ML
INJECTION, SOLUTION INTRA-ARTICULAR; INTRALESIONAL; INTRAMUSCULAR; INTRAVENOUS; SOFT TISSUE PRN
Status: DISCONTINUED | OUTPATIENT
Start: 2022-09-21 | End: 2022-09-21 | Stop reason: SURG

## 2022-09-21 RX ORDER — HEPARIN SODIUM,PORCINE 1000/ML
VIAL (ML) INJECTION
Status: DISCONTINUED | OUTPATIENT
Start: 2022-09-21 | End: 2022-09-21 | Stop reason: HOSPADM

## 2022-09-21 RX ORDER — GABAPENTIN 300 MG/1
600 CAPSULE ORAL
Status: DISCONTINUED | OUTPATIENT
Start: 2022-09-21 | End: 2022-09-24 | Stop reason: HOSPADM

## 2022-09-21 RX ORDER — ACETAMINOPHEN 500 MG
1000 TABLET ORAL ONCE
Status: COMPLETED | OUTPATIENT
Start: 2022-09-21 | End: 2022-09-21

## 2022-09-21 RX ORDER — ONDANSETRON 2 MG/ML
4 INJECTION INTRAMUSCULAR; INTRAVENOUS EVERY 4 HOURS PRN
Status: DISCONTINUED | OUTPATIENT
Start: 2022-09-21 | End: 2022-09-24 | Stop reason: HOSPADM

## 2022-09-21 RX ORDER — ACETAMINOPHEN 500 MG
1000 TABLET ORAL EVERY 6 HOURS PRN
Status: DISCONTINUED | OUTPATIENT
Start: 2022-09-26 | End: 2022-09-24 | Stop reason: HOSPADM

## 2022-09-21 RX ORDER — LIDOCAINE 50 MG/G
1 PATCH TOPICAL EVERY 24 HOURS
Status: DISCONTINUED | OUTPATIENT
Start: 2022-09-21 | End: 2022-09-24 | Stop reason: HOSPADM

## 2022-09-21 RX ORDER — SODIUM CHLORIDE AND POTASSIUM CHLORIDE 150; 900 MG/100ML; MG/100ML
INJECTION, SOLUTION INTRAVENOUS CONTINUOUS
Status: DISPENSED | OUTPATIENT
Start: 2022-09-21 | End: 2022-09-22

## 2022-09-21 RX ORDER — VANCOMYCIN HYDROCHLORIDE 1 G/20ML
INJECTION, POWDER, LYOPHILIZED, FOR SOLUTION INTRAVENOUS
Status: COMPLETED | OUTPATIENT
Start: 2022-09-21 | End: 2022-09-21

## 2022-09-21 RX ORDER — DOCUSATE SODIUM 100 MG/1
100 CAPSULE, LIQUID FILLED ORAL 2 TIMES DAILY
Status: DISCONTINUED | OUTPATIENT
Start: 2022-09-21 | End: 2022-09-24 | Stop reason: HOSPADM

## 2022-09-21 RX ORDER — ONDANSETRON 2 MG/ML
4 INJECTION INTRAMUSCULAR; INTRAVENOUS
Status: DISCONTINUED | OUTPATIENT
Start: 2022-09-21 | End: 2022-09-21 | Stop reason: HOSPADM

## 2022-09-21 RX ORDER — LIDOCAINE HYDROCHLORIDE 20 MG/ML
INJECTION, SOLUTION EPIDURAL; INFILTRATION; INTRACAUDAL; PERINEURAL PRN
Status: DISCONTINUED | OUTPATIENT
Start: 2022-09-21 | End: 2022-09-21 | Stop reason: SURG

## 2022-09-21 RX ORDER — MORPHINE SULFATE 4 MG/ML
2 INJECTION INTRAVENOUS ONCE
Status: ACTIVE | OUTPATIENT
Start: 2022-09-21 | End: 2022-09-22

## 2022-09-21 RX ADMIN — SENNOSIDES AND DOCUSATE SODIUM 1 TABLET: 50; 8.6 TABLET ORAL at 21:16

## 2022-09-21 RX ADMIN — DEXAMETHASONE SODIUM PHOSPHATE 8 MG: 4 INJECTION, SOLUTION INTRA-ARTICULAR; INTRALESIONAL; INTRAMUSCULAR; INTRAVENOUS; SOFT TISSUE at 10:07

## 2022-09-21 RX ADMIN — VANCOMYCIN HYDROCHLORIDE 1 G: 1 INJECTION, POWDER, LYOPHILIZED, FOR SOLUTION INTRAVENOUS at 09:55

## 2022-09-21 RX ADMIN — FENTANYL CITRATE 50 MCG: 50 INJECTION, SOLUTION INTRAMUSCULAR; INTRAVENOUS at 09:35

## 2022-09-21 RX ADMIN — LIDOCAINE HYDROCHLORIDE 70 MG: 20 INJECTION, SOLUTION EPIDURAL; INFILTRATION; INTRACAUDAL at 09:47

## 2022-09-21 RX ADMIN — ACETAMINOPHEN 1000 MG: 500 TABLET ORAL at 17:13

## 2022-09-21 RX ADMIN — ONDANSETRON 4 MG: 2 INJECTION INTRAMUSCULAR; INTRAVENOUS at 12:30

## 2022-09-21 RX ADMIN — DOCUSATE SODIUM 100 MG: 100 CAPSULE, LIQUID FILLED ORAL at 17:13

## 2022-09-21 RX ADMIN — Medication: at 17:05

## 2022-09-21 RX ADMIN — DEXMEDETOMIDINE 35 MCG: 200 INJECTION, SOLUTION INTRAVENOUS at 09:47

## 2022-09-21 RX ADMIN — GABAPENTIN 600 MG: 300 CAPSULE ORAL at 21:16

## 2022-09-21 RX ADMIN — PROPOFOL 20 MG: 10 INJECTION, EMULSION INTRAVENOUS at 12:43

## 2022-09-21 RX ADMIN — ATORVASTATIN CALCIUM 10 MG: 10 TABLET, FILM COATED ORAL at 17:13

## 2022-09-21 RX ADMIN — ACETAMINOPHEN 1000 MG: 500 TABLET ORAL at 08:00

## 2022-09-21 RX ADMIN — FENTANYL CITRATE 100 MCG: 50 INJECTION, SOLUTION INTRAMUSCULAR; INTRAVENOUS at 09:47

## 2022-09-21 RX ADMIN — SUGAMMADEX 200 MG: 100 INJECTION, SOLUTION INTRAVENOUS at 10:37

## 2022-09-21 RX ADMIN — MAGNESIUM SULFATE HEPTAHYDRATE 2 G: 40 INJECTION, SOLUTION INTRAVENOUS at 10:48

## 2022-09-21 RX ADMIN — FENTANYL CITRATE 50 MCG: 50 INJECTION, SOLUTION INTRAMUSCULAR; INTRAVENOUS at 12:28

## 2022-09-21 RX ADMIN — LIDOCAINE 1 PATCH: 50 PATCH TOPICAL at 17:14

## 2022-09-21 RX ADMIN — PHENYLEPHRINE HYDROCHLORIDE 40 MCG/MIN: 10 INJECTION INTRAVENOUS at 10:11

## 2022-09-21 RX ADMIN — ROPIVACAINE HYDROCHLORIDE: 2 INJECTION, SOLUTION EPIDURAL; INFILTRATION; PERINEURAL at 13:33

## 2022-09-21 RX ADMIN — ROCURONIUM BROMIDE 40 MG: 10 INJECTION, SOLUTION INTRAVENOUS at 09:47

## 2022-09-21 RX ADMIN — VANCOMYCIN HYDROCHLORIDE 1000 MG: 500 INJECTION, POWDER, LYOPHILIZED, FOR SOLUTION INTRAVENOUS at 21:16

## 2022-09-21 RX ADMIN — SODIUM CHLORIDE, POTASSIUM CHLORIDE, SODIUM LACTATE AND CALCIUM CHLORIDE: 600; 310; 30; 20 INJECTION, SOLUTION INTRAVENOUS at 10:51

## 2022-09-21 RX ADMIN — FENTANYL CITRATE 50 MCG: 50 INJECTION, SOLUTION INTRAMUSCULAR; INTRAVENOUS at 10:15

## 2022-09-21 RX ADMIN — SODIUM CHLORIDE, POTASSIUM CHLORIDE, SODIUM LACTATE AND CALCIUM CHLORIDE: 600; 310; 30; 20 INJECTION, SOLUTION INTRAVENOUS at 12:32

## 2022-09-21 RX ADMIN — CEFAZOLIN SODIUM 2 G: 2 INJECTION, SOLUTION INTRAVENOUS at 17:13

## 2022-09-21 RX ADMIN — DEXMEDETOMIDINE 0.6 MCG/KG/HR: 200 INJECTION, SOLUTION INTRAVENOUS at 09:47

## 2022-09-21 RX ADMIN — Medication 5 MG: at 21:16

## 2022-09-21 RX ADMIN — PROPOFOL 20 MG: 10 INJECTION, EMULSION INTRAVENOUS at 12:49

## 2022-09-21 RX ADMIN — POTASSIUM CHLORIDE AND SODIUM CHLORIDE: 900; 150 INJECTION, SOLUTION INTRAVENOUS at 17:11

## 2022-09-21 RX ADMIN — PROPOFOL 70 MG: 10 INJECTION, EMULSION INTRAVENOUS at 09:47

## 2022-09-21 RX ADMIN — OXYCODONE HYDROCHLORIDE 5 MG: 5 SOLUTION ORAL at 14:56

## 2022-09-21 RX ADMIN — SODIUM CHLORIDE, POTASSIUM CHLORIDE, SODIUM LACTATE AND CALCIUM CHLORIDE: 600; 310; 30; 20 INJECTION, SOLUTION INTRAVENOUS at 08:01

## 2022-09-21 ASSESSMENT — PAIN DESCRIPTION - PAIN TYPE
TYPE: SURGICAL PAIN
TYPE: ACUTE PAIN
TYPE: SURGICAL PAIN
TYPE: SURGICAL PAIN
TYPE: ACUTE PAIN;SURGICAL PAIN

## 2022-09-21 ASSESSMENT — COGNITIVE AND FUNCTIONAL STATUS - GENERAL
DAILY ACTIVITIY SCORE: 19
STANDING UP FROM CHAIR USING ARMS: A LITTLE
MOVING TO AND FROM BED TO CHAIR: A LITTLE
CLIMB 3 TO 5 STEPS WITH RAILING: A LITTLE
SUGGESTED CMS G CODE MODIFIER DAILY ACTIVITY: CK
TOILETING: A LITTLE
DRESSING REGULAR UPPER BODY CLOTHING: A LITTLE
MOVING FROM LYING ON BACK TO SITTING ON SIDE OF FLAT BED: A LITTLE
TURNING FROM BACK TO SIDE WHILE IN FLAT BAD: A LITTLE
EATING MEALS: A LITTLE
WALKING IN HOSPITAL ROOM: A LITTLE
HELP NEEDED FOR BATHING: A LITTLE
DRESSING REGULAR LOWER BODY CLOTHING: A LITTLE
SUGGESTED CMS G CODE MODIFIER MOBILITY: CK
MOBILITY SCORE: 18

## 2022-09-21 ASSESSMENT — LIFESTYLE VARIABLES
TOTAL SCORE: 0
EVER HAD A DRINK FIRST THING IN THE MORNING TO STEADY YOUR NERVES TO GET RID OF A HANGOVER: NO
TOTAL SCORE: 0
ALCOHOL_USE: NO
ON A TYPICAL DAY WHEN YOU DRINK ALCOHOL HOW MANY DRINKS DO YOU HAVE: 0
EVER FELT BAD OR GUILTY ABOUT YOUR DRINKING: NO
HAVE PEOPLE ANNOYED YOU BY CRITICIZING YOUR DRINKING: NO
TOTAL SCORE: 0
DOES PATIENT WANT TO STOP DRINKING: NO
CONSUMPTION TOTAL: NEGATIVE
HAVE YOU EVER FELT YOU SHOULD CUT DOWN ON YOUR DRINKING: NO
AVERAGE NUMBER OF DAYS PER WEEK YOU HAVE A DRINK CONTAINING ALCOHOL: 0
HOW MANY TIMES IN THE PAST YEAR HAVE YOU HAD 5 OR MORE DRINKS IN A DAY: 0

## 2022-09-21 ASSESSMENT — FIBROSIS 4 INDEX: FIB4 SCORE: 1.56

## 2022-09-21 ASSESSMENT — PATIENT HEALTH QUESTIONNAIRE - PHQ9
2. FEELING DOWN, DEPRESSED, IRRITABLE, OR HOPELESS: NOT AT ALL
SUM OF ALL RESPONSES TO PHQ9 QUESTIONS 1 AND 2: 0
1. LITTLE INTEREST OR PLEASURE IN DOING THINGS: NOT AT ALL

## 2022-09-21 ASSESSMENT — PAIN SCALES - GENERAL: PAIN_LEVEL: 2

## 2022-09-21 NOTE — ANESTHESIA PROCEDURE NOTES
Airway    Date/Time: 9/21/2022 9:50 AM  Performed by: Janelle Larry M.D.  Authorized by: Janelle Larry M.D.     Location:  OR  Urgency:  Elective  Indications for Airway Management:  Anesthesia      Spontaneous Ventilation: absent    Sedation Level:  Deep  Preoxygenated: Yes    Patient Position:  Sniffing  Mask Difficulty Assessment:  1 - vent by mask  Final Airway Type:  Endotracheal airway  Final Endotracheal Airway:  ETT  Cuffed: Yes    Technique Used for Successful ETT Placement:  Direct laryngoscopy  Devices/Methods Used in Placement:  Cricoid pressure and intubating stylet    Insertion Site:  Oral  Blade Type:  Lissa  Laryngoscope Blade/Videolaryngoscope Blade Size:  3  ETT Size (mm):  7.0  Measured from:  Teeth  ETT to Teeth (cm):  21  Placement Verified by: capnometry and palpation of cuff    Cormack-Lehane Classification:  Grade IIb - view of arytenoids or posterior of glottis only  Number of Attempts at Approach:  1

## 2022-09-21 NOTE — PROGRESS NOTES
No change to my last H and P (that is labelled a progress note). The note was made by either myself, or my PAs Serjio Martini or Yvan Shearer but is signed by me.If it was done by my physician assistant, I verify it is correct and has my co-signature.    Rosa M Bonner MD PhD DK

## 2022-09-21 NOTE — PROGRESS NOTES
Patient reviewed in PACU.  Legs are moving.  Drains compressed.   She is comfortable and sleepy.    Plan:  1. Routine postop care  2. Will likely need home health for d/c home Friday/Saturday

## 2022-09-21 NOTE — ANESTHESIA PREPROCEDURE EVALUATION
Case: [id number] Anesthesia Start Date/Time: 09/21/22 0935    Procedures:       L3-4 and L4-5 decompressive laminectomy, bilateral foraminotomies and L3-4-5 posterior lumbar instrumented fusion (Spine Lumbar)      DECOMPRESSION, SPINE, LUMBAR (Spine Lumbar)      LAMINECTOMY, SPINE, LUMBAR, WITH DISCECTOMY (Spine Lumbar)      FORAMINOTOMY, SPINE (Spine Lumbar)    Anesthesia type: General    Diagnosis:       Neurogenic claudication due to lumbar spinal stenosis [M48.062]      Spondylolisthesis, lumbar region [M43.16]    Pre-op diagnosis: Neurogenic claudication due to lumbar spinal stenosis. Spondylolisthesis, lumbar region     Location: [place] [address] / SURGERY [facility]    Surgeons: Rosa M Bonner M.D.        88yo w/lumbar spinal stenosis and spondylolisthesis    Relevant Problems   ANESTHESIA (within normal limits)      NEURO   (positive) History of colon cancer - s/p resection   (positive) History of recurrent deep vein thrombosis (DVT)      CARDIAC   (positive) Deep vein thrombosis - recurrent, on chronic anticoagulation   (positive) Essential hypertension, benign   (positive) Moderate aortic stenosis   (positive) Right bundle branch block      GI   (positive) GERD (gastroesophageal reflux disease)      Other   (positive) Back pain   (positive) Long term (current) use of anticoagulants   (positive) Osteoporosis   (positive) Rheumatoid arthritis involving multiple sites with positive rheumatoid factor (HCC)   (positive) Rheumatoid nodule (HCC)   (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  Some limitation to activity due to back issues    Denies CAD, CP/SOB, CVA, HTN, DM, LUNG/LIVER/KIDNEY DZ, URI    Physical Exam    Airway   Mallampati: II  TM distance: >3 FB  Neck ROM: limited    Comments: Slight decrease ROM neck   Cardiovascular    Rhythm: regular  Rate: normal  (-) murmur     Dental - normal exam           Pulmonary   Breath sounds clear to auscultation     Abdominal    Neurological - normal exam                 Anesthesia Plan    ASA 3 (mod-severe AS)   ASA physical status 3 criteria: other (comment)    Plan - general       Airway plan will be ETT    (Preinduction a-line)      Induction: intravenous    Postoperative Plan: Postoperative administration of opioids is intended.    Pertinent diagnostic labs and testing reviewed    Informed Consent:    Anesthetic plan and risks discussed with patient.    Use of blood products discussed with: patient whom consented to blood products.

## 2022-09-21 NOTE — ANESTHESIA TIME REPORT
Anesthesia Start and Stop Event Times     Date Time Event    9/21/2022 0907 Ready for Procedure     0935 Anesthesia Start     1305 Anesthesia Stop        Responsible Staff  09/21/22    Name Role Begin End    Janelle Larry M.D. Anesth 0935 1303        Overtime Reason:  no overtime (within assigned shift)    Comments:

## 2022-09-21 NOTE — OP REPORT
1. DATE OF SURGERY:  9/21/2022    2. SURGEON:  Rosa M Bonner MD, PhD, DK, Neurosurgeon    3. ASSISTANT:  Yvan Shearer PA-C  An assistant was crucial to have during the case as the assistant helped with positioning, keeping the field clear of blood and retraction. This case could not be done easily without a qualified assistant. It was medically necessary  .       4. TYPE OF ANESTHESIA:  General anesthesia with endotracheal intubation    5. PREOPERATIVE DIAGNOSIS:        1. Facet-mediated low back pain.   2. Grade 1/2 L4-L5 spondylolisthesis.   3. Moderate severe spinal stenosis L3-L4, L4-L5.  4.  Failed conservative therapy  5.  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  Possible nerve damage      6. POSTOPERATIVE DIAGNOSIS: as above    1. Facet-mediated low back pain.   2. Grade 1/2 L4-L5 spondylolisthesis.   3. Moderate severe spinal stenosis L3-L4, L4-L5.  4.  Failed conservative therapy  5.  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  Possible nerve damage    7. HISTORY:         12/08/2020: This is a very nice 85-year-old woman. Her main complaint is back pain. She is somewhat complicated. She has rheumatoid arthritis. She has had previous DVTs. She struggles with chronic low back pain. In 07/2020, she had right leg sciatica. She had an epidural by Dr. Cooper and this settled it. When I saw her today, her back pain was 5 out of 10. When she sits, it is a 6-8 out of 10. When she stands up it is an 8 out of 10. She can gets some aching in the legs. She has had some issues with the knees but most of the pain is in the back. She has been doing physical therapy at the moment. Patient Indicates a past history of: arthritis, blood clots DVT (lung/legs), GERD/heartburn, high blood pressure, osteoporosis, rheumatoid arthritis. Specified cancer(s):MELANOMA She had previous  knee replacements and had infected bursitis in both knees. She has had a hip replacement, a colectomy, and an infected hematoma around the knee. 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 She gets headaches and neuropathy. She has rheumatoid arthritis but is no longer taking methotrexate. She has had facet joint blocks in the past and a single epidural this year.   01/21/2021: The patient returns for review. She had L4- 5, L5-S1 facet joint injections and these really helped. She is feeling pretty good at the moment. For now she does not want to do anything. The facet joint ablation is certainly an option. We will see how things progress.    9/12/2022  Patient returns.  She is miserable.  She has back pain.  She has right L5 pain.  Its burning in nature.  It is 9 out of 10 sitting.  She has difficulty walking.  She uses a front wheel walker.  She had PT in the past.  She is on gabapentin.  She takes 600 a day.  She has had epidurals, trigger point injections and facet joint injections.  She is miserable.  He is requesting surgical intervention.    Intercurrently she is being treated for melanoma that involve the lymph nodes in the left axilla.  She gets infusions every day.  She is no longer on methotrexate.  She still on Xarelto.  He recently saw cardiologist and an echo.  She had a hip replacement.  She is not happy with this as it seems to be a little bit off compared to the off compared to the other hip.    8.  PREOPERATIVE PHYSICAL EXAMINATION: See formal admission H and P      2020:  She was neurologically intact, but she was exquisitely tender throughout the lumbar spine over the facet joints. She had decreased reflexes. I did not test sensation because of the Xarelto other than light touch, which was intact.    9/12/2022  She is a front wheel walker.  She had numbness in the right L5 distribution.  There was no weakness.  She walks with a flexed posture.    Last Imaging Result(s):      2020: I independently reviewed and assessed the imaging. I also reviewed all imaging reports. She had plain x-rays and an MRI scan of the lumbar spine at [facility]. She has a grade 1/2 L4-L5 spondylolisthesis on the xrays scan, which is stable. On the MRI scan, she has severe stenosis at this level and at L3-L4, with a central disc herniation at L3-L4. There is mild stenosis at the other levels.     She had an updated MRI scan and plain x-rays of number 9/6/2022.  She still has severe spinal stenosis at L3-4.  There is moderate stenosis with a right-sided synovial cyst at L4-5.  There is a mobile L4-5 spondylolisthesis.  She has small disc bulges at other levels.        9. TITLE OF THE PROCEDURE:    L3 decompressive laminectomy and bilateral foraminotomies(laminectomies for neural decompression, not just for implant placement)  L4 decompressive laminectomy, bilateral foraminotomies and complete L4-L5 facetectomies (Cook procedure) (laminectomies for neural decompression, not just for implant placement)  L5 decompressive laminectomy and bilateral foraminotomies (laminectomies for neural decompression, not just for implant placement)  Bilateral L4-5 transpedicular decompression of the common thecal sac and exiting L4 nerve roots (laminectomies for neural decompression, not just for implant placement)  Right L4-L5 microdiscectomy.  L4-5 nonsegmental fixation using pedicle screws and rods with O-arm navigation  L4-5 Pj osteotomy with removal of all bone from pedicle to pedicle at L4-5  Placement of a L4-L5 eexpandible interbody cage  L4-5 Interbody fusion using local morcellized autograft and InFUSE BMP  L4-5 Posterolateral fusion using local morcellized autograft, matrix master graft and infuse BMP   Open reduction of L4-5 spondylolisthesis  O- Arm navigation for screw placement  Microscopic magnification  i/o On-Q Pain catheters in paraspinal musculature      10. OPERATIVE FINDINGS:  Stenosis as outlined  above.  The degree of stenosis is extreme.  She had a grade 1/2 L4-5 spinal stenosis with severe bilateral foraminal narrowing.  The facet joint synovium was adherent to the dura.  There was severe spinal stenosis at L3-4 as well.  She was well decompressed.  L5-S1 was sacralized.    The degree of compression was  significant. The bone work required was way beyond that which was required for implant placement and I had to perform decompressions as a separate procedure to the bone work required for implant placement. The stenosis was bilateral and at the levels described and needed to be addressed. Additional bone work was required for placement of the cage but this was distinctly different to the decompressions required to address stenosis.     11. OPERATED LEVELS: L3-L4-5    12. IMPLANTS USED:  Medacta screws and rods  [employer] expandable interbody cage  Matrix master graft and infuse BMP      13. COMPLICATIONS:  Nil.    14. ESTIMATED BLOOD LOSS:       250  mL    15. OPERATIVE DETAILS:    After fully informed consent, the patient was brought to the operating room. General anesthesia was administered.  The patient was given intravenous antibiotic.  A Gunderson catheter was placed.  The patient was carefully turned prone on the OSI operating table in the Ketan frame.  All pressure points were padded.      The posterior lumbar region was prepped and draped in a standard fashion.  A skin incision was marked under fluoroscopic guidance.  After the skin incision was made, bilateral subperiosteal exposure was affected at the effected levels.  I continued the dissection out to the transverse processes of L3, L4 and L5, which were decorticated for later bone grafting.  Great care was taken not to violate the pedicle, the facet joint capsules above and below.       Attention then turned to placement of pedicle screws.  The O arm was brought in. The right iliac spike was placed. This spin was affected. Using navigation  I then cannulated the L3, L4 and L5 pedicles. In each case the pedicles were cannulated, palpated, stimulated, tapped and then appropriate size screws were placed. 6 mm screws of appropriate length were placed into L3, L4 and L5. These were 6.5 mm x 50 mm at L3 and L4.  45 mm screws by 6 mm were placed at L5.    All the screws were stimulated and AP lateral x-ray was taken to confirm placement.     Initially, I performed a laminectomy using Leksell rongeurs and then an AM-8 drill bit to thin down the lamina, then a combination of 4, 3, and 2 mm Kerrison punches.  The nerve roots on either side were followed out and completely decompressed.  A bilateral transpedicular decompression of the exiting L5 nerve root was effected. Complete facetectomies at L4-5 were effected. A Pj osteotomy with removal of all bone from pedicle to pedicle was affected L4-5 unroofing the exiting nerve roots. The final decompression was done under the microscope.  The laminectomy involve the inferior two thirds of L3, all of L4 and half of L5.  I took off the facet joints on both sides in order to adequately decompress the patient.      With gentle medial retraction of the right traversing nerve root, the L4-5 disk space was incised.  I then entered the disk space at an oblique 30-degree angle and using O-arm guidance to ensure that I did violate the anterior disk space, a complete discectomy was affected.  Great care was taken not to violate the annulus.  The disk material was removed with various spreaders, rafita and gouges.  The endplates were then decorticated.  I then selected the various trials and at a 30-degree angle with gentle medial retraction of the traversing nerve root and protecting the exiting nerve root, I selected the height and length of the cage that I would place.      Further bone graft was placed anteriorly into the interspace, into the cage as well as posteriorly.     The cage was then impacted into place. This  was a 7 x 30 mm expandable cage.   Hemostasis obtained.  More bone graft was placed through the  the cage  along with a sliver of BMP.  I also placed bone graft over the decorticated posterolateral gutters from L3 down to L5.  This was mixed with matrix master graft and infuse BMP.    Hemostasis obtained.  At the end of the cage placement, all the nerve roots were free.  AP and lateral fluoroscopy confirmed satisfactory placement of the cage.  I then went on to complete the rest of the procedure. Appropriately sized rods were then secured with locking caps to the screws. Open reduction of the spondylolisthesis was affected. Properly sized rods were placed prior to reduction. A final AP lateral x-ray was taken.  All the instrumentation was tightened. I checked the foramina one last time and these were all free. Hemostasis obtained. Final x-ray showed good placement of the instrumentation satisfactory reduction of the spinal listhesis.       Final AP and lateral x-ray was taken.  Meticulous hemostasis was obtained.  Vancomycin powder was placed throughout the wound.  Two suction subfascial Hemovacs were placed.  Two paraspinal On-Q Pain catheters were then placed using direct visualization to ensure they stayed in the paraspinal musculature.  Dermabond was placed over the puncture sites for the On-Q Pain catheters.  The drains were secured using 2-0 silk.  The wound was then closed in multiple layers using 0 Vicryl for the fascia, then 2-0 Vicryl for the subdermis, and then 2-0 nylon for skin.  A dressing was applied.  All counts were correct and all instruments were accounted for.  Hemostasis obtained.     All counts were correct and all instruments accounted for.      16. PROGNOSIS:    The surgery went well.  The patient was moving both lower extremities well at the end of the case.  We will see how things progress.  I would anticipate discharge in 24 to 72 hours unless there are issues that require rehabilitation  and effecting mobility.  The patient will be asked to abstain from smoking and anti-inflammatories for 3 months.  A brace will be provided and this needs to be worn every time out of bed.  The patient has also been instructed that they need to avoid any bending, lifting, or twisting, and stay away from anti-inflammatories.  The patient was instructed not to shower for the next 72 hours.      Rosa M Bonner MD, PhD, DK   Cc: Marisa R. Traversie, A.P.R.N. Denver Miller, MD Colin Nguyen, MD Marisa Traverse Carol Dennehy, MD Andrea Tatro, MD Dr Nelson Dr Singh Sharlene Su

## 2022-09-21 NOTE — OR NURSING
1303: Pt arrives to PACU asleep and calm. VSS. Lower back dressing CDI, 2 hemovacs and 1 OnQ pump in place.     1500: Pt states pain is mild. Medicated per MAR and repositioned to her left side. Dressing CDI.     1518: Pts friend called and updated.     1600: Dressing CDI. Pt states pain is tolerable and denies nausea. Pt going to her room with transport. Report called to Regina LARA.

## 2022-09-21 NOTE — OR SURGEON
Immediate Post OP Note    PreOp Diagnosis:     1. Facet-mediated low back pain.   2. Grade 1/2 L4-L5 spondylolisthesis.   3. Moderate severe spinal stenosis L3-L4, L4-L5.  4.  Failed conservative therapy  5.  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  Possible nerve damage      PostOp Diagnosis:     1. Facet-mediated low back pain.   2. Grade 1/2 L4-L5 spondylolisthesis.   3. Moderate severe spinal stenosis L3-L4, L4-L5.  4.  Failed conservative therapy  5.  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  Possible nerve damage      Procedure(s):  L3-4 and L4-5 decompressive laminectomy, bilateral foraminotomies and L3-4-5 posterior lumbar instrumented fusion - Wound Class: Clean with Drain  DECOMPRESSION, SPINE, LUMBAR - Wound Class: Clean with Drain  LAMINECTOMY, SPINE, LUMBAR, WITH DISCECTOMY - Wound Class: Clean with Drain  FORAMINOTOMY, SPINE - Wound Class: Clean with Drain    Surgeon(s):  Rosa M Bonner M.D.    Anesthesiologist/Type of Anesthesia:  Anesthesiologist: Janelle Larry M.D./General    Surgical Staff:  Cell Saver : Martha Leon  Circulator: Lukas D. Gansert, R.N.  Scrub Person: Jasmyne Mcgovern Assist: Yvan Shearer P.A.-C.  Radiology Technologist: Holli Howard    Specimens removed if any:  * No specimens in log *    Assistants: Yvan Shearer PA-C  ,  Specimen: nil    Estimated Blood Loss: 250 cc    Findings: n/a    Complications: nil         9/21/2022 1:58 PM Rosa M Bonner M.D.

## 2022-09-21 NOTE — LETTER
September 14, 2022    Patient Name: Marry Mathur  Surgeon Name: Rosa M Bonner M.D.  Surgery Facility: [facility] ([address] [place] [address])  Surgery Date: 9/21/2022    The time of your surgery is not final and may change up to and until the day of your surgery. You will be contacted 1-2 business days prior to your surgery date with your check-in and surgery time.    BEFORE YOUR SURGERY  Pre Registration and/or Lab Work/Tests must be done within 7 to 28 days before your surgery date. These will be completed at [facility] with an appointment    TODAY, 9.14.22: Please call [facility] Pre Admission/Registration Department, please call them at [phone] option 2, then option 1, for an appointment.      Please inform Lissa the date of this appointment at, [phone] or by email, [email or web address].    Pre op Appointment:   Date: 9.16.22    Time: 11:00 am   Provider: Yvan Shearer PA-C   Location: [address] [place] [address]    Bring a list of all medications you are currently taking including the dosing and frequency.    Please read the MEDICATION INSTRUCTIONS below completely.    DAY OF YOUR SURGERY  Nothing to eat or drink and refrain from smoking any substance after midnight the day of your surgery. Smoking may interfere with the anesthetic and frequently produces nausea during the recovery period.    Continue taking all lifesaving medications, including the morning of your surgery with small sip of water.    Please arrive at the hospital/surgery center at the check-in time provided.     You will need a responsible adult to drive you to and from your surgery.          AFTER YOUR SURGERY  2 Week Post op Appointment:   Date:10.05.22   Time: 11:00 am  With: Yvan Shearer PA-C  Location: [address] [place] [address]    6 Week Post op Appointment:   Date: 11.17.22   Time: 10:30 am   With: Yvan Shearer PA-C/Rosa M Bonner MD  Location: [address] [place] [address]    MEDICATION INSTRUCTIONS Do  not take these medications prior to your procedure:  • Anti-inflammatories: stop 7 days prior, restart when advised. For fusions avoid for 12 weeks after surgery  o Naproxen (Naprosyn or Alleve)  o Motrin  o Ibuprofen  o Nabumetone (Relafen)  o Meloxicam (Mobic)  o Celebrex  o Salsalate  o Diclofenac (Arthrotec, Voltaren, Flector)  o Sulindac (Clinoril  o Etodolac (Lodine)  o Indomethacin (Indocin)  o Ketoprofen  o Ketorolac  o Oxaprozin (Daypro)  o Piroxicam (Feldene  o Blood thinners (stop after approval from the prescribing physician)  • Aspirin (any dosage): 10 days prior, restart 7 days after procedure  o Any medications that contain aspirin in combination (ie: Excedrin migraine, Fiorinal, and Norgesic)  • Warfarin (Coumadin): Stop 7 days prior, INR day of procedure, restart 2-3 weeks after procedure  • Antiplatelet: restart 7 days after procedure  o Ticlid (ticlopidine): Stop 14 days prior  o  Plavix (clopidogrel): Stop 7 days prior  o Aggrenox or Dipyridamole: Stop 14 days prior  o ReoPro (abciximab): Stop 14 days prior  o Integrilin (eptifibatide): Stop 14 days prior  • Aggrastat (tirofiban): Stop 14 days prior  • Lovenox (Enoxaparin): Stop 24hrs before and restart 24hrs after procedure  • Heparin: Stop 24hrs before   • Dalteparin (Fragmin): Stop 24hrs before  • Fondaparinux (Arixtra): Stop 24hrs before  • Xeralto, Dabigatran (Pradaxa) Stop 5 days prior  • Eliquis (apibaxan) Stop 7 days prior  Okay to take these medications as prescribed:  • Muscle relaxers  • Acetaminophen and pain medications that have it in addition to oxycodone and hydrocodone  • Blood pressure, cholesterol and diabetes medications are ok    TIME OFF WORK  FMLA or Disability forms can be faxed directly to [phone] or you may drop them off at any [facility]. Our office charges a $35.00 fee. Forms will be completed within 5-10 business days after payment is received. For the status of your forms you may contact our  disability office directly at [phone].    DENTAL PROCDURES/CLEANINGS Avoid 3 weeks before surgery and for 3 months after surgery.    QUESTIONS ABOUT COSTS  Contact our Patient Financial Services department at [phone] for more information.    If you have any questions, please contact our office.    Lissa Landon   Surgery Scheduler  ? [phone]   Fax: [phone]  [phone]  [place] [address]  [phone]

## 2022-09-21 NOTE — ANESTHESIA PROCEDURE NOTES
Arterial Line  Performed by: Janelle Larry M.D.  Authorized by: Janelle Larry M.D.     Start Time:  9/21/2022 9:42 AM  End Time:  9/21/2022 9:44 AM  Localization: ultrasound guidance and surface landmarks    Patient Location:  OR  Indication: continuous blood pressure monitoring        Catheter Size:  20 G  Seldinger Technique?: Yes    Laterality:  Right  Site:  Radial artery  Line Secured:  Antimicrobial disc, tape and transparent dressing  Events: patient tolerated procedure well with no complications

## 2022-09-21 NOTE — ANESTHESIA POSTPROCEDURE EVALUATION
Patient: Marry Mathur    Procedure Summary     Date: 09/21/22 Room / Location: [place] [address] / SURGERY [facility]    Anesthesia Start: 0935 Anesthesia Stop: 1305    Procedures:       L3-4 and L4-5 decompressive laminectomy, bilateral foraminotomies and L3-4-5 posterior lumbar instrumented fusion (Spine Lumbar)      DECOMPRESSION, SPINE, LUMBAR (Spine Lumbar)      LAMINECTOMY, SPINE, LUMBAR, WITH DISCECTOMY (Spine Lumbar)      FORAMINOTOMY, SPINE (Spine Lumbar) Diagnosis:       Neurogenic claudication due to lumbar spinal stenosis      Spondylolisthesis, lumbar region      (Neurogenic claudication due to lumbar spinal stenosis. Spondylolisthesis, lumbar region)    Surgeons: Rosa M Bonner M.D. Responsible Provider: Janelle Larry M.D.    Anesthesia Type: general ASA Status: 3          Final Anesthesia Type: general  Last vitals  BP   Blood Pressure : (!) 93/42    Temp   37 °C (98.6 °F)    Pulse   (!) 58   Resp   (!) 11    SpO2   96 %      Anesthesia Post Evaluation    Patient location during evaluation: PACU  Patient participation: complete - patient participated  Level of consciousness: sleepy but conscious  Pain score: 2    Airway patency: patent  Anesthetic complications: no  Cardiovascular status: adequate  Respiratory status: acceptable    PONV: none          No notable events documented.     Nurse Pain Score: 5 (NPRS)

## 2022-09-22 LAB
ANION GAP SERPL CALC-SCNC: 6 MMOL/L (ref 7–16)
APTT PPP: 26.2 SEC (ref 24.7–36)
BUN SERPL-MCNC: 18 MG/DL (ref 8–22)
CALCIUM SERPL-MCNC: 7.2 MG/DL (ref 8.5–10.5)
CHLORIDE SERPL-SCNC: 106 MMOL/L (ref 96–112)
CO2 SERPL-SCNC: 24 MMOL/L (ref 20–33)
CREAT SERPL-MCNC: 0.62 MG/DL (ref 0.5–1.4)
ERYTHROCYTE [DISTWIDTH] IN BLOOD BY AUTOMATED COUNT: 45 FL (ref 35.9–50)
GFR SERPLBLD CREATININE-BSD FMLA CKD-EPI: 87 ML/MIN/1.73 M 2
GLUCOSE SERPL-MCNC: 121 MG/DL (ref 65–99)
HCT VFR BLD AUTO: 29.3 % (ref 37–47)
HGB BLD-MCNC: 9.6 G/DL (ref 12–16)
INR PPP: 1.17 (ref 0.87–1.13)
MCH RBC QN AUTO: 31.3 PG (ref 27–33)
MCHC RBC AUTO-ENTMCNC: 32.8 G/DL (ref 33.6–35)
MCV RBC AUTO: 95.4 FL (ref 81.4–97.8)
PLATELET # BLD AUTO: 145 K/UL (ref 164–446)
PMV BLD AUTO: 11 FL (ref 9–12.9)
POTASSIUM SERPL-SCNC: 4.7 MMOL/L (ref 3.6–5.5)
PROTHROMBIN TIME: 14.8 SEC (ref 12–14.6)
RBC # BLD AUTO: 3.07 M/UL (ref 4.2–5.4)
SODIUM SERPL-SCNC: 136 MMOL/L (ref 135–145)
WBC # BLD AUTO: 8.2 K/UL (ref 4.8–10.8)

## 2022-09-22 PROCEDURE — A9270 NON-COVERED ITEM OR SERVICE: HCPCS | Performed by: PHYSICIAN ASSISTANT

## 2022-09-22 PROCEDURE — 700111 HCHG RX REV CODE 636 W/ 250 OVERRIDE (IP): Performed by: PHYSICIAN ASSISTANT

## 2022-09-22 PROCEDURE — 85027 COMPLETE CBC AUTOMATED: CPT

## 2022-09-22 PROCEDURE — 700105 HCHG RX REV CODE 258: Performed by: NEUROLOGICAL SURGERY

## 2022-09-22 PROCEDURE — 97535 SELF CARE MNGMENT TRAINING: CPT

## 2022-09-22 PROCEDURE — 80048 BASIC METABOLIC PNL TOTAL CA: CPT

## 2022-09-22 PROCEDURE — 97162 PT EVAL MOD COMPLEX 30 MIN: CPT

## 2022-09-22 PROCEDURE — 700105 HCHG RX REV CODE 258: Performed by: PHYSICIAN ASSISTANT

## 2022-09-22 PROCEDURE — 36415 COLL VENOUS BLD VENIPUNCTURE: CPT

## 2022-09-22 PROCEDURE — 700111 HCHG RX REV CODE 636 W/ 250 OVERRIDE (IP): Performed by: NEUROLOGICAL SURGERY

## 2022-09-22 PROCEDURE — 770001 HCHG ROOM/CARE - MED/SURG/GYN PRIV*

## 2022-09-22 PROCEDURE — 85610 PROTHROMBIN TIME: CPT

## 2022-09-22 PROCEDURE — 99024 POSTOP FOLLOW-UP VISIT: CPT | Performed by: PHYSICIAN ASSISTANT

## 2022-09-22 PROCEDURE — 97165 OT EVAL LOW COMPLEX 30 MIN: CPT

## 2022-09-22 PROCEDURE — 85730 THROMBOPLASTIN TIME PARTIAL: CPT

## 2022-09-22 PROCEDURE — 700102 HCHG RX REV CODE 250 W/ 637 OVERRIDE(OP): Performed by: PHYSICIAN ASSISTANT

## 2022-09-22 PROCEDURE — 700101 HCHG RX REV CODE 250: Performed by: PHYSICIAN ASSISTANT

## 2022-09-22 RX ORDER — SODIUM CHLORIDE 9 MG/ML
INJECTION, SOLUTION INTRAVENOUS CONTINUOUS
Status: DISCONTINUED | OUTPATIENT
Start: 2022-09-22 | End: 2022-09-24 | Stop reason: HOSPADM

## 2022-09-22 RX ORDER — SODIUM CHLORIDE 9 MG/ML
1000 INJECTION, SOLUTION INTRAVENOUS ONCE
Status: DISCONTINUED | OUTPATIENT
Start: 2022-09-22 | End: 2022-09-22

## 2022-09-22 RX ORDER — SODIUM CHLORIDE AND POTASSIUM CHLORIDE 150; 900 MG/100ML; MG/100ML
INJECTION, SOLUTION INTRAVENOUS CONTINUOUS
Status: DISCONTINUED | OUTPATIENT
Start: 2022-09-22 | End: 2022-09-22

## 2022-09-22 RX ORDER — ENOXAPARIN SODIUM 100 MG/ML
40 INJECTION SUBCUTANEOUS DAILY
Status: DISCONTINUED | OUTPATIENT
Start: 2022-09-22 | End: 2022-09-24 | Stop reason: HOSPADM

## 2022-09-22 RX ORDER — OXYCODONE HYDROCHLORIDE 5 MG/1
5 TABLET ORAL EVERY 4 HOURS PRN
Status: DISCONTINUED | OUTPATIENT
Start: 2022-09-22 | End: 2022-09-24 | Stop reason: HOSPADM

## 2022-09-22 RX ORDER — OXYCODONE HYDROCHLORIDE 5 MG/1
2.5 TABLET ORAL EVERY 4 HOURS PRN
Status: DISCONTINUED | OUTPATIENT
Start: 2022-09-22 | End: 2022-09-24 | Stop reason: HOSPADM

## 2022-09-22 RX ORDER — SODIUM CHLORIDE 9 MG/ML
500 INJECTION, SOLUTION INTRAVENOUS ONCE
Status: COMPLETED | OUTPATIENT
Start: 2022-09-22 | End: 2022-09-22

## 2022-09-22 RX ADMIN — OMEPRAZOLE 20 MG: 20 CAPSULE, DELAYED RELEASE ORAL at 05:44

## 2022-09-22 RX ADMIN — VANCOMYCIN HYDROCHLORIDE 1000 MG: 500 INJECTION, POWDER, LYOPHILIZED, FOR SOLUTION INTRAVENOUS at 09:53

## 2022-09-22 RX ADMIN — ACETAMINOPHEN 1000 MG: 500 TABLET ORAL at 00:16

## 2022-09-22 RX ADMIN — POTASSIUM CHLORIDE AND SODIUM CHLORIDE: 900; 150 INJECTION, SOLUTION INTRAVENOUS at 22:22

## 2022-09-22 RX ADMIN — DOCUSATE SODIUM 100 MG: 100 CAPSULE, LIQUID FILLED ORAL at 05:43

## 2022-09-22 RX ADMIN — ENOXAPARIN SODIUM 40 MG: 40 INJECTION SUBCUTANEOUS at 17:38

## 2022-09-22 RX ADMIN — CEFAZOLIN SODIUM 2 G: 2 INJECTION, SOLUTION INTRAVENOUS at 00:16

## 2022-09-22 RX ADMIN — OXYCODONE 5 MG: 5 TABLET ORAL at 17:39

## 2022-09-22 RX ADMIN — DOCUSATE SODIUM 100 MG: 100 CAPSULE, LIQUID FILLED ORAL at 17:38

## 2022-09-22 RX ADMIN — LIDOCAINE 1 PATCH: 50 PATCH TOPICAL at 17:38

## 2022-09-22 RX ADMIN — SODIUM CHLORIDE 500 ML: 9 INJECTION, SOLUTION INTRAVENOUS at 20:55

## 2022-09-22 RX ADMIN — Medication 5 MG: at 22:05

## 2022-09-22 RX ADMIN — ACETAMINOPHEN 1000 MG: 500 TABLET ORAL at 05:43

## 2022-09-22 RX ADMIN — ATORVASTATIN CALCIUM 10 MG: 10 TABLET, FILM COATED ORAL at 17:38

## 2022-09-22 RX ADMIN — POLYETHYLENE GLYCOL 3350 1 PACKET: 17 POWDER, FOR SOLUTION ORAL at 09:53

## 2022-09-22 RX ADMIN — POTASSIUM CHLORIDE AND SODIUM CHLORIDE: 900; 150 INJECTION, SOLUTION INTRAVENOUS at 04:54

## 2022-09-22 RX ADMIN — SENNOSIDES AND DOCUSATE SODIUM 1 TABLET: 50; 8.6 TABLET ORAL at 22:04

## 2022-09-22 RX ADMIN — ACETAMINOPHEN 1000 MG: 500 TABLET ORAL at 10:01

## 2022-09-22 RX ADMIN — SODIUM CHLORIDE: 9 INJECTION, SOLUTION INTRAVENOUS at 23:12

## 2022-09-22 RX ADMIN — GABAPENTIN 600 MG: 300 CAPSULE ORAL at 22:04

## 2022-09-22 RX ADMIN — OXYCODONE 5 MG: 5 TABLET ORAL at 08:26

## 2022-09-22 RX ADMIN — ACETAMINOPHEN 1000 MG: 500 TABLET ORAL at 17:38

## 2022-09-22 ASSESSMENT — COGNITIVE AND FUNCTIONAL STATUS - GENERAL
TURNING FROM BACK TO SIDE WHILE IN FLAT BAD: A LITTLE
MOBILITY SCORE: 18
STANDING UP FROM CHAIR USING ARMS: A LITTLE
MOVING TO AND FROM BED TO CHAIR: A LITTLE
SUGGESTED CMS G CODE MODIFIER DAILY ACTIVITY: CK
MOVING FROM LYING ON BACK TO SITTING ON SIDE OF FLAT BED: A LITTLE
PERSONAL GROOMING: A LITTLE
HELP NEEDED FOR BATHING: A LITTLE
SUGGESTED CMS G CODE MODIFIER MOBILITY: CK
DRESSING REGULAR UPPER BODY CLOTHING: A LITTLE
WALKING IN HOSPITAL ROOM: A LITTLE
DRESSING REGULAR LOWER BODY CLOTHING: A LOT
CLIMB 3 TO 5 STEPS WITH RAILING: A LITTLE
DAILY ACTIVITIY SCORE: 19

## 2022-09-22 ASSESSMENT — GAIT ASSESSMENTS
DISTANCE (FEET): 200
ASSISTIVE DEVICE: FRONT WHEEL WALKER
GAIT LEVEL OF ASSIST: SUPERVISED
DEVIATION: BRADYKINETIC

## 2022-09-22 ASSESSMENT — PAIN DESCRIPTION - PAIN TYPE
TYPE: ACUTE PAIN;SURGICAL PAIN
TYPE: ACUTE PAIN;SURGICAL PAIN

## 2022-09-22 ASSESSMENT — ACTIVITIES OF DAILY LIVING (ADL): TOILETING: INDEPENDENT

## 2022-09-22 NOTE — PROGRESS NOTES
Assumed care of patient at bedside report from day RN. Updated on POC. Patient currently A & O x 4; on 3 L O2 via N/C; up max assist with  complaints of acute pain. Assessment completed.  Call light within reach. Whiteboard updated. Fall precautions in place. Bed locked and in lowest position. All questions answered. No other needs indicated at this time.

## 2022-09-22 NOTE — PROGRESS NOTES
4 Eyes Skin Assessment Completed by MARCO Tapia and MARCO Kruger.    Head WDL  Ears WDL  Nose WDL  Mouth WDL  Neck WDL  Breast/Chest WDL  Shoulder Blades WDL  Spine Incision  (R) Arm/Elbow/Hand WDL  (L) Arm/Elbow/Hand WDL  Abdomen WDL  Groin WDL  Scrotum/Coccyx/Buttocks Redness and Blanching  (R) Leg Bruising  (L) Leg Bruising  (R) Heel/Foot/Toe WDL  (L) Heel/Foot/Toe WDL          Devices In Places Pulse Ox, Gunderson, SCD's, Nasal Cannula, and MARIS's      Interventions In Place NC W/Ear Foams, Pillows, and Pressure Redistribution Mattress    Possible Skin Injury No    Pictures Uploaded Into Epic N/A  Wound Consult Placed N/A  RN Wound Prevention Protocol Ordered No

## 2022-09-22 NOTE — CARE PLAN
The patient is Stable - Low risk of patient condition declining or worsening    Shift Goals  Clinical Goals: pain control, mobilize, PT/OT, void  Patient Goals: pain control, mobilize  Family Goals: NA    Progress made toward(s) clinical / shift goals:    Problem: Pain - Standard  Goal: Alleviation of pain or a reduction in pain to the patient’s comfort goal  Outcome: Progressing   Oxy given PRN with +results. Educated pt on importance of pain control- pt verbalized understanding.    Problem: Knowledge Deficit - Standard  Goal: Patient and family/care givers will demonstrate understanding of plan of care, disease process/condition, diagnostic tests and medications  Outcome: Progressing   POC discussed- pt verbalized understanding.  Problem: Fall Risk  Goal: Patient will remain free from falls  Outcome: Progressing  Call light w/in reach. Instructed pt to call for assistance before getting OOB- pt verbalized understanding.     Patient is not progressing towards the following goals:

## 2022-09-22 NOTE — CARE PLAN
The patient is Stable - Low risk of patient condition declining or worsening    Shift Goals  Clinical Goals: pain control, titrate O2, rest  Patient Goals: rest  Family Goals: NA    Progress made toward(s) clinical / shift goals:    Problem: Pain - Standard  Goal: Alleviation of pain or a reduction in pain to the patient’s comfort goal  Outcome: Progressing     Problem: Knowledge Deficit - Standard  Goal: Patient and family/care givers will demonstrate understanding of plan of care, disease process/condition, diagnostic tests and medications  Outcome: Progressing     Problem: Fall Risk  Goal: Patient will remain free from falls  Outcome: Progressing   Pt updated on plan of care. Pt encouraged to ask questions and questions were answered. Call light within reach. Pt reminded to use call light whenever needing assistance.  Pt states pain is controlled with PCA and On Q pain pump, will continue to assess sedation and titrate O2 as needed.     Patient is not progressing towards the following goals: N/A

## 2022-09-22 NOTE — PROGRESS NOTES
"Neurosurgery  POD# 1  Seen with Dr. Bonner  Has not ambulated, de luna in place  Tolerating oral medications  Denies nausea, vomiting  Pain controlled on current medication regimen  Leg symptoms improved    Objective:  BP (!) 89/53   Pulse 76   Temp 36.6 °C (97.8 °F) (Temporal)   Resp 16   Ht 1.6 m (5' 3\")   Wt 70.7 kg (155 lb 13.8 oz)   SpO2 96%     Intake/Output Summary (Last 24 hours) at 9/22/2022 0808  Last data filed at 9/22/2022 0454  Gross per 24 hour   Intake 2440 ml   Output 2350 ml   Net 90 ml       Recent Labs     09/22/22  0547   WBC 8.2   RBC 3.07*   HEMOGLOBIN 9.6*   HEMATOCRIT 29.3*   MCV 95.4   MCH 31.3   MCHC 32.8*   RDW 45.0   PLATELETCT 145*   MPV 11.0     Recent Labs     09/22/22  0547   SODIUM 136   POTASSIUM 4.7   CHLORIDE 106   CO2 24   GLUCOSE 121*   BUN 18     Recent Labs     09/22/22  0547   APTT 26.2   INR 1.17*     VSS  Hgb 9.6 will watch, otherwise LS  Surgical incision clean, dry, intact, no evidence of infection  Strength:  Lower extremities are 5/5 grossly  Otherwise neurologically intact    Assessment:  Active Hospital Problems    Diagnosis     Back pain [M54.9]      Priority: High    Lumbar stenosis with neurogenic claudication [M48.062]     Spondylolisthesis, lumbar region [M43.16]      Added automatically from request for surgery [id number]       POD#1 S/p L3-5 laminectomy and fusion  Chemoprophylaxis: Start Lovenox this evening    Plan:  1. Ambulate with PT/OT as tolerated - LSO when OOB and active  2. Advance diet as tolerated  3. D/c PCA, D/c IV fluids this evening, d/c De Luna -advance orals  4. Continue hemovac to suction, On-q open  5. Lovenox this evening  6. Aim for home Friday, awaiting PT/OT assessment for possible HH recommendations   "

## 2022-09-22 NOTE — CARE PLAN
The patient is Watcher - Medium risk of patient condition declining or worsening    Shift Goals  Problem: Pain - Standard  Goal: Alleviation of pain or a reduction in pain to the patient’s comfort goal  Outcome: Progressing     Problem: Knowledge Deficit - Standard  Goal: Patient and family/care givers will demonstrate understanding of plan of care, disease process/condition, diagnostic tests and medications  Outcome: Progressing   POC discussed- pt verbalized understanding  Problem: Fall Risk  Goal: Patient will remain free from falls  Outcome: Progressing   Bed alarm in use. Call light w/in reach. Instructed pt to call for assistance before getting OOB- pt verbalized understanding.   Clinical Goals: pain control, ambulate  Patient Goals: rest  PCA in use. Educated pt on importance of pain control- pt verbalized understanding.    Progress made toward(s) clinical / shift goals:    Patient is not progressing towards the following goals:

## 2022-09-22 NOTE — THERAPY
Physical Therapy   Initial Evaluation     Patient Name: Marry Mathur  Age:  87 y.o., Sex:  female  Medical Record #: [id number]  Today's Date: 9/22/2022     Precautions  Precautions: Fall Risk;Lumbosacral Orthosis;Spinal / Back Precautions   Comments: LSO when OOB > 5 min    Assessment  Ms. Mathur is an 88 y/o female who presents to acute secondary to elective spine surgery that included L3-5 laminectomies, decompression, and fusion. Reviewed spinal precautions from OT, pt demonstrated understanding. No sensory impairments. Cues for sit to stand. Pt able to perform transfers and gait without physical assist. Steady gait using FWW. No LOB. Reviewed activity recommendations including walking program and glute sets. Pt demonstrated understanding. No additional acute PT needs.    Plan    Recommend Physical Therapy for Evaluation only     DC Equipment Recommendations: None  Discharge Recommendations: Recommend outpatient physical therapy services to address higher level deficits            Objective       09/22/22 0953   Prior Living Situation   Prior Services None   Housing / Facility [facility]   Steps Into Home 2  (has ramp)   Equipment Owned Front-Wheel Walker   Lives with - Patient's Self Care Capacity Alone and Able to Care For Self   Comments hsa friends who can assist as needed   Prior Level of Functional Mobility   Bed Mobility Independent   Transfer Status Independent   Ambulation Independent   Distance Ambulation (Feet)   (community ambulator)   Assistive Devices Used Front-Wheel Walker   Comments intermittent use of FWW as back pain has progressed   Cognition    Level of Consciousness Alert   Comments very motivated and pleasant   Passive ROM Lower Body   Passive ROM Lower Body WDL   Active ROM Lower Body    Active ROM Lower Body  WDL   Strength Lower Body   Lower Body Strength  WDL   Sensation Lower Body   Lower Extremity Sensation   WDL   Balance Assessment   Sitting Balance (Static) Good    Sitting Balance (Dynamic) Good   Standing Balance (Static) Fair +   Standing Balance (Dynamic) Fair   Weight Shift Sitting Good   Weight Shift Standing Fair   Comments FWW   Gait Analysis   Gait Level Of Assist Supervised   Assistive Device Front Wheel Walker   Distance (Feet) 200   # of Times Distance was Traveled 1   Deviation Bradykinetic   Weight Bearing Status no restrictions   Bed Mobility    Supine to Sit   (up in chair)   Sit to Supine Supervised   Scooting Supervised   Functional Mobility   Sit to Stand Supervised   Bed, Chair, Wheelchair Transfer Supervised

## 2022-09-23 LAB
ANION GAP SERPL CALC-SCNC: 6 MMOL/L (ref 7–16)
APTT PPP: 33.7 SEC (ref 24.7–36)
BUN SERPL-MCNC: 17 MG/DL (ref 8–22)
CALCIUM SERPL-MCNC: 7.3 MG/DL (ref 8.5–10.5)
CHLORIDE SERPL-SCNC: 110 MMOL/L (ref 96–112)
CO2 SERPL-SCNC: 25 MMOL/L (ref 20–33)
CREAT SERPL-MCNC: 0.46 MG/DL (ref 0.5–1.4)
ERYTHROCYTE [DISTWIDTH] IN BLOOD BY AUTOMATED COUNT: 46.9 FL (ref 35.9–50)
GFR SERPLBLD CREATININE-BSD FMLA CKD-EPI: 93 ML/MIN/1.73 M 2
GLUCOSE SERPL-MCNC: 118 MG/DL (ref 65–99)
HCT VFR BLD AUTO: 29.9 % (ref 37–47)
HGB BLD-MCNC: 9.6 G/DL (ref 12–16)
INR PPP: 1.17 (ref 0.87–1.13)
MCH RBC QN AUTO: 31 PG (ref 27–33)
MCHC RBC AUTO-ENTMCNC: 32.1 G/DL (ref 33.6–35)
MCV RBC AUTO: 96.5 FL (ref 81.4–97.8)
PLATELET # BLD AUTO: 134 K/UL (ref 164–446)
PMV BLD AUTO: 11.3 FL (ref 9–12.9)
POTASSIUM SERPL-SCNC: 4.4 MMOL/L (ref 3.6–5.5)
PROTHROMBIN TIME: 14.7 SEC (ref 12–14.6)
RBC # BLD AUTO: 3.1 M/UL (ref 4.2–5.4)
SODIUM SERPL-SCNC: 141 MMOL/L (ref 135–145)
WBC # BLD AUTO: 7.3 K/UL (ref 4.8–10.8)

## 2022-09-23 PROCEDURE — 99024 POSTOP FOLLOW-UP VISIT: CPT | Performed by: PHYSICIAN ASSISTANT

## 2022-09-23 PROCEDURE — A9270 NON-COVERED ITEM OR SERVICE: HCPCS | Performed by: PHYSICIAN ASSISTANT

## 2022-09-23 PROCEDURE — 700101 HCHG RX REV CODE 250: Performed by: PHYSICIAN ASSISTANT

## 2022-09-23 PROCEDURE — 770001 HCHG ROOM/CARE - MED/SURG/GYN PRIV*

## 2022-09-23 PROCEDURE — 85027 COMPLETE CBC AUTOMATED: CPT

## 2022-09-23 PROCEDURE — 85610 PROTHROMBIN TIME: CPT

## 2022-09-23 PROCEDURE — 700102 HCHG RX REV CODE 250 W/ 637 OVERRIDE(OP): Performed by: PHYSICIAN ASSISTANT

## 2022-09-23 PROCEDURE — RXMED WILLOW AMBULATORY MEDICATION CHARGE: Performed by: PHYSICIAN ASSISTANT

## 2022-09-23 PROCEDURE — 700111 HCHG RX REV CODE 636 W/ 250 OVERRIDE (IP): Performed by: PHYSICIAN ASSISTANT

## 2022-09-23 PROCEDURE — 85730 THROMBOPLASTIN TIME PARTIAL: CPT

## 2022-09-23 PROCEDURE — 36415 COLL VENOUS BLD VENIPUNCTURE: CPT

## 2022-09-23 PROCEDURE — 80048 BASIC METABOLIC PNL TOTAL CA: CPT

## 2022-09-23 RX ORDER — TIZANIDINE 2 MG/1
2 TABLET ORAL 3 TIMES DAILY PRN
Qty: 90 TABLET | Refills: 3 | Status: ON HOLD | OUTPATIENT
Start: 2022-09-23 | End: 2023-01-09

## 2022-09-23 RX ORDER — CEPHALEXIN 500 MG/1
500 CAPSULE ORAL 4 TIMES DAILY
Qty: 20 CAPSULE | Refills: 0 | Status: ON HOLD | OUTPATIENT
Start: 2022-09-23 | End: 2022-09-28

## 2022-09-23 RX ORDER — OXYCODONE HYDROCHLORIDE 5 MG/1
5 TABLET ORAL EVERY 4 HOURS PRN
Qty: 42 TABLET | Refills: 0 | Status: SHIPPED | OUTPATIENT
Start: 2022-09-23 | End: 2022-10-01

## 2022-09-23 RX ORDER — ACETAMINOPHEN 500 MG
1000 TABLET ORAL EVERY 6 HOURS
Qty: 90 TABLET | Refills: 0 | Status: SHIPPED | OUTPATIENT
Start: 2022-09-23 | End: 2022-10-03

## 2022-09-23 RX ORDER — CEPHALEXIN 500 MG/1
500 CAPSULE ORAL 4 TIMES DAILY
Status: DISCONTINUED | OUTPATIENT
Start: 2022-09-23 | End: 2022-09-24 | Stop reason: HOSPADM

## 2022-09-23 RX ADMIN — ATORVASTATIN CALCIUM 10 MG: 10 TABLET, FILM COATED ORAL at 17:43

## 2022-09-23 RX ADMIN — ACETAMINOPHEN 1000 MG: 500 TABLET ORAL at 23:31

## 2022-09-23 RX ADMIN — OXYCODONE 5 MG: 5 TABLET ORAL at 09:29

## 2022-09-23 RX ADMIN — DOCUSATE SODIUM 100 MG: 100 CAPSULE, LIQUID FILLED ORAL at 17:43

## 2022-09-23 RX ADMIN — DOCUSATE SODIUM 100 MG: 100 CAPSULE, LIQUID FILLED ORAL at 04:43

## 2022-09-23 RX ADMIN — CEPHALEXIN 500 MG: 500 CAPSULE ORAL at 20:09

## 2022-09-23 RX ADMIN — ACETAMINOPHEN 1000 MG: 500 TABLET ORAL at 04:43

## 2022-09-23 RX ADMIN — LIDOCAINE 1 PATCH: 50 PATCH TOPICAL at 16:41

## 2022-09-23 RX ADMIN — ENOXAPARIN SODIUM 40 MG: 40 INJECTION SUBCUTANEOUS at 17:43

## 2022-09-23 RX ADMIN — OXYCODONE 5 MG: 5 TABLET ORAL at 16:41

## 2022-09-23 RX ADMIN — Medication 5 MG: at 20:10

## 2022-09-23 RX ADMIN — OMEPRAZOLE 20 MG: 20 CAPSULE, DELAYED RELEASE ORAL at 04:43

## 2022-09-23 RX ADMIN — SENNOSIDES AND DOCUSATE SODIUM 1 TABLET: 50; 8.6 TABLET ORAL at 20:10

## 2022-09-23 RX ADMIN — GABAPENTIN 600 MG: 300 CAPSULE ORAL at 20:10

## 2022-09-23 RX ADMIN — CEPHALEXIN 500 MG: 500 CAPSULE ORAL at 08:27

## 2022-09-23 RX ADMIN — POLYETHYLENE GLYCOL 3350 1 PACKET: 17 POWDER, FOR SOLUTION ORAL at 12:07

## 2022-09-23 RX ADMIN — ACETAMINOPHEN 1000 MG: 500 TABLET ORAL at 12:08

## 2022-09-23 RX ADMIN — CEPHALEXIN 500 MG: 500 CAPSULE ORAL at 16:41

## 2022-09-23 RX ADMIN — CEPHALEXIN 500 MG: 500 CAPSULE ORAL at 12:07

## 2022-09-23 RX ADMIN — MAGNESIUM HYDROXIDE 30 ML: 400 SUSPENSION ORAL at 04:44

## 2022-09-23 RX ADMIN — OXYCODONE 5 MG: 5 TABLET ORAL at 02:49

## 2022-09-23 RX ADMIN — ACETAMINOPHEN 1000 MG: 500 TABLET ORAL at 17:43

## 2022-09-23 ASSESSMENT — PAIN DESCRIPTION - PAIN TYPE
TYPE: SURGICAL PAIN
TYPE: SURGICAL PAIN

## 2022-09-23 NOTE — CARE PLAN
The patient is   Problem: Pain - Standard  Goal: Alleviation of pain or a reduction in pain to the patient’s comfort goal  Outcome: Progressing     Problem: Knowledge Deficit - Standard  Goal: Patient and family/care givers will demonstrate understanding of plan of care, disease process/condition, diagnostic tests and medications  Outcome: Progressing     Problem: Fall Risk  Goal: Patient will remain free from falls  Outcome: Progressing       Shift Goals  Clinical Goals: BP monitoring , increase mobility  Patient Goals: Rest  Family Goals: NA    Progress made toward(s) clinical / shift goals:    Problem: Pain - Standard  Goal: Alleviation of pain or a reduction in pain to the patient’s comfort goal  Outcome: Progressing     Problem: Knowledge Deficit - Standard  Goal: Patient and family/care givers will demonstrate understanding of plan of care, disease process/condition, diagnostic tests and medications  Outcome: Progressing     Problem: Fall Risk  Goal: Patient will remain free from falls  Outcome: Progressing       Patient is not progressing towards the following goals:

## 2022-09-23 NOTE — PROGRESS NOTES
"Neurosurgery  POD# 2  Some hypotension overnight, improved this morning with fluid resuscitation  Feeling more achy/tired this morning  Ambulating, voiding  Tolerating oral medications  Denies nausea, vomiting, headache  Pain controlled on current medication regimen  Leg symptoms improved, still some ache in the legs with ambulating    Objective:  /68   Pulse 78   Temp 36.9 °C (98.4 °F) (Temporal)   Resp 16   Ht 1.6 m (5' 3\")   Wt 70.7 kg (155 lb 13.8 oz)   SpO2 97%     Intake/Output Summary (Last 24 hours) at 9/22/2022 0808  Last data filed at 9/22/2022 0454  Gross per 24 hour   Intake 2440 ml   Output 2350 ml   Net 90 ml       Recent Labs     09/22/22  0547 09/23/22  0609   WBC 8.2 7.3   RBC 3.07* 3.10*   HEMOGLOBIN 9.6* 9.6*   HEMATOCRIT 29.3* 29.9*   MCV 95.4 96.5   MCH 31.3 31.0   MCHC 32.8* 32.1*   RDW 45.0 46.9   PLATELETCT 145* 134*   MPV 11.0 11.3     Recent Labs     09/22/22  0547 09/23/22  0609   SODIUM 136 141   POTASSIUM 4.7 4.4   CHLORIDE 106 110   CO2 24 25   GLUCOSE 121* 118*   BUN 18 17     Recent Labs     09/22/22  0547 09/23/22  0609   APTT 26.2 33.7   INR 1.17* 1.17*     VSS  Hgb 9.6 stable, otherwise LS  Hemovac 90 and 0cc/8hr am  Surgical incision clean, dry, intact, no evidence of infection  Strength:  Lower extremities are 5/5 grossly  Otherwise neurologically intact    Assessment:  Active Hospital Problems    Diagnosis     Back pain [M54.9]      Priority: High    Lumbar stenosis with neurogenic claudication [M48.062]     Spondylolisthesis, lumbar region [M43.16]      Added automatically from request for surgery [id number]       POD#2 S/p L3-5 laminectomy and fusion  Chemoprophylaxis: Lovenox started 9/22/2022    Plan:  1. Ambulate with PT/OT as tolerated - LSO when OOB and active  2. Advance diet as tolerated  3. Add muscle relaxer TID PRN  4. Continue hemovac to suction, On-q open  5. Patient will see how she is feeling at 1400 today,   6. Aim for home this afternoon versus Saturday " morning, scripts sent in anticipation of discharge

## 2022-09-23 NOTE — DISCHARGE SUMMARY
Discharge Summary    CHIEF COMPLAINT ON ADMISSION  No chief complaint on file.      Reason for Admission  Spondylolisthesis, lumbar region     Admission Date  9/21/2022    CODE STATUS  Full Code    HPI & HOSPITAL COURSE  12/08/2020: This is a very nice 85-year-old woman. Her main complaint is back pain. She is somewhat complicated. She has rheumatoid arthritis. She has had previous DVTs. She struggles with chronic low back pain. In 07/2020, she had right leg sciatica. She had an epidural by Dr. Cooper and this settled it. When I saw her today, her back pain was 5 out of 10. When she sits, it is a 6-8 out of 10. When she stands up it is an 8 out of 10. She can gets some aching in the legs. She has had some issues with the knees but most of the pain is in the back. She has been doing physical therapy at the moment. Patient Indicates a past history of: arthritis, blood clots DVT (lung/legs), GERD/heartburn, high blood pressure, osteoporosis, rheumatoid arthritis. Specified cancer(s):MELANOMA She had previous knee replacements and had infected bursitis in both knees. She has had a hip replacement, a colectomy, and an infected hematoma around the knee. 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 She gets headaches and neuropathy. She has rheumatoid arthritis but is no longer taking methotrexate. She has had facet joint blocks in the past and a single epidural this year.   01/21/2021: The patient returns for review. She had L4- 5, L5-S1 facet joint injections and these really helped. She is feeling pretty good at the moment. For now she does not want to do anything. The facet joint ablation is certainly an option. We will see how things progress.     9/12/2022  Patient returns.  She is miserable.  She has back pain.  She has right L5 pain.  Its burning in nature.  It is 9 out of 10 sitting.  She has difficulty walking.  She uses a front wheel walker.  She had PT in the past.  She is on gabapentin.   She takes 600 a day.  She has had epidurals, trigger point injections and facet joint injections.  She is miserable.  He is requesting surgical intervention.    Intercurrently she is being treated for melanoma that involve the lymph nodes in the left axilla.  She gets infusions every day.  She is no longer on methotrexate.  She still on Xarelto.  He recently saw cardiologist and an echo.  She had a hip replacement.  She is not happy with this as it seems to be a little bit off compared to the off compared to the other hip.     8.  PREOPERATIVE PHYSICAL EXAMINATION: See formal admission H and P        2020:  She was neurologically intact, but she was exquisitely tender throughout the lumbar spine over the facet joints. She had decreased reflexes. I did not test sensation because of the Xarelto other than light touch, which was intact.     9/12/2022  She is a front wheel walker.  She had numbness in the right L5 distribution.  There was no weakness.  She walks with a flexed posture.     Last Imaging Result(s):      2020: I independently reviewed and assessed the imaging. I also reviewed all imaging reports. She had plain x-rays and an MRI scan of the lumbar spine at [facility]. She has a grade 1/2 L4-L5 spondylolisthesis on the xrays scan, which is stable. On the MRI scan, she has severe stenosis at this level and at L3-L4, with a central disc herniation at L3-L4. There is mild stenosis at the other levels.      She had an updated MRI scan and plain x-rays of number 9/6/2022.  She still has severe spinal stenosis at L3-4.  There is moderate stenosis with a right-sided synovial cyst at L4-5.  There is a mobile L4-5 spondylolisthesis.  She has small disc bulges at other levels.    9/24/2022  On postoperative day #3 she is doing well.  She reports improvement of her preoperative leg symptoms.  She does get some achiness when she mobilizes.  She has continued with incisional site pain as well as muscular spasms primarily  when she is moving.  Her pain is well controlled on oral analgesics at this time.  She is eating and drinking without complication.  She is mobilizing well.  She has remained hemodynamically stable.  She is mildly anemic with mild hypotension overnight which responded well to fluid resuscitation.  She reports that she will have 24 hour help at home the next 2 days. She has met criteria for discharge and will discharge home today.        No notes on file    Therefore, she is discharged in fair and stable condition to home with close outpatient follow-up.    The patient met 2-midnight criteria for an inpatient stay at the time of discharge.    Discharge Date  9/23/2022      FOLLOW UP ITEMS POST DISCHARGE  Follow up with our office in 2, 6, and 12 weeks.  Report to ER with any complications.  Call our office with any questions or concerns.  No anti-inflammatories for 3 months, she will self administer Lovenox injections until postoperative day #14 at which time she can restart her normal anticoagulant for history of DVT.  She should hold off on Prolia injections for 6 weeks after surgery.  Clear to shower Saturday, pat incision dry, no special creams or ointments.   Avoid bending, lifting and twisting.   Walk 4-6 times per day as tolerated to help prevent blood clots.     DISCHARGE DIAGNOSES  Principal Problem:    Lumbar stenosis with neurogenic claudication POA: Yes  Active Problems:    Back pain POA: Unknown    Spondylolisthesis, lumbar region POA: Unknown      Overview: Added automatically from request for surgery [id number]  Resolved Problems:    * No resolved hospital problems. *      FOLLOW UP  Future Appointments   Date Time Provider Department Center   10/5/2022 11:00 AM [facility]   10/14/2022  1:20 PM Haseeb Serrano M.D. [facility] None   11/17/2022 10:30 AM ERIN [facility]   12/16/2022  3:00 PM [facility] PHARMACIST [facility]   12/29/2022  2:30 PM  "Deanna Escoto M.D. [facility] None   [facility]     No follow-up provider specified.    MEDICATIONS ON DISCHARGE     Medication List        START taking these medications        Instructions   cephALEXin 500 MG Caps  Commonly known as: KEFLEX   Take 1 Capsule by mouth 4 times a day for 5 days.  Dose: 500 mg     oxyCODONE immediate-release 5 MG Tabs  Commonly known as: ROXICODONE   Take 1 Tablet by mouth every four hours as needed for Severe Pain for up to 7 days.  Dose: 5 mg            CHANGE how you take these medications        Instructions   acetaminophen 500 MG Tabs  What changed:   how much to take  when to take this  Commonly known as: TYLENOL   Take 2 Tablets by mouth every 6 hours.  Dose: 1,000 mg     tizanidine 2 MG tablet  What changed:   how much to take  when to take this  reasons to take this  Commonly known as: ZANAFLEX   Take 1 Tablet by mouth 3 times a day as needed (muscle spasm).  Dose: 2 mg            CONTINUE taking these medications        Instructions   atorvastatin 10 MG Tabs  Commonly known as: LIPITOR   Take 1 Tablet by mouth every evening.  Dose: 10 mg     CALCIUM 600 PO   Take 300 mg by mouth.  Dose: 300 mg     enoxaparin 40 MG/0.4ML Sosy inj  Commonly known as: Lovenox   Inject 40 mg under the skin every day for 21 days. Start today 9/14/2022 last day 9/19/2022. Then to restart on 9/23/2022.  Dose: 40 mg     gabapentin 300 MG Caps  Commonly known as: NEURONTIN   Take 2 Capsules by mouth at bedtime.  Dose: 600 mg     lidocaine 5 % Ptch  Commonly known as: LIDODERM   APPLY ONE PATCH TO CLEAN DRY SKIN AS DIRECTED DAILY     melatonin 5 mg Tabs   Take 10 mg by mouth every evening.  Dose: 10 mg     NON SPECIFIED   LLD. Right 1/2\"  Limb length difference.     omeprazole 20 MG delayed-release capsule  Commonly known as: PRILOSEC   Take 1 Capsule by mouth every day.  Dose: 20 mg     polyethylene glycol/lytes 17 g Pack  Commonly known as: MIRALAX   Take " 17 g by mouth every day. 1/2 TSP AM  Dose: 17 g     PRESERVISION AREDS PO   Take  by mouth.     Sodium Fluoride 5000 Plus 1.1 % Crea  Generic drug: SODIUM FLUORIDE (DENTAL GEL)   BRUSH WITH PEA SIZED AMOUNT EVERY DAY PREFERABLY BEFORE BEDTIME. SPIT OUT ALL EXCESS. DO NOT RINSE     vitamin D 1000 Unit (25 mcg) Tabs  Commonly known as: cholecalciferol   Take 500 Units by mouth every morning.  Dose: 500 Units            STOP taking these medications      Prolia 60 MG/ML Sosy injection  Generic drug: denosumab     traMADol 50 MG Tabs  Commonly known as: Ultram              Allergies  Allergies   Allergen Reactions    Wound Dressing Adhesive      Pt says band-aids cause skin reaction/rash if on for more than 2 days  Paper tape OK         DIET  Orders Placed This Encounter   Procedures    Diet Order Diet: Regular     Standing Status:   Standing     Number of Occurrences:   1     Order Specific Question:   Diet:     Answer:   Regular [1]       ACTIVITY  As tolerated.  Weight bearing as tolerated    CONSULTATIONS  None    PROCEDURES  TITLE OF THE PROCEDURE:    L3 decompressive laminectomy and bilateral foraminotomies(laminectomies for neural decompression, not just for implant placement)  L4 decompressive laminectomy, bilateral foraminotomies and complete L4-L5 facetectomies (Cook procedure) (laminectomies for neural decompression, not just for implant placement)  L5 decompressive laminectomy and bilateral foraminotomies (laminectomies for neural decompression, not just for implant placement)  Bilateral L4-5 transpedicular decompression of the common thecal sac and exiting L4 nerve roots (laminectomies for neural decompression, not just for implant placement)  Right L4-L5 microdiscectomy.  L4-5 nonsegmental fixation using pedicle screws and rods with O-arm navigation  L4-5 Pj osteotomy with removal of all bone from pedicle to pedicle at L4-5  Placement of a L4-L5 eexpandible interbody cage  L4-5 Interbody fusion using  local morcellized autograft and InFUSE BMP  L4-5 Posterolateral fusion using local morcellized autograft, matrix master graft and infuse BMP   Open reduction of L4-5 spondylolisthesis  O- Arm navigation for screw placement  Microscopic magnification  i/o On-Q Pain catheters in paraspinal musculature    LABORATORY  Lab Results   Component Value Date    SODIUM 141 09/23/2022    POTASSIUM 4.4 09/23/2022    CHLORIDE 110 09/23/2022    CO2 25 09/23/2022    GLUCOSE 118 (H) 09/23/2022    BUN 17 09/23/2022    CREATININE 0.46 (L) 09/23/2022        Lab Results   Component Value Date    WBC 7.3 09/23/2022    HEMOGLOBIN 9.6 (L) 09/23/2022    HEMATOCRIT 29.9 (L) 09/23/2022    PLATELETCT 134 (L) 09/23/2022        Total time of the discharge process exceeds 30 minutes.

## 2022-09-24 ENCOUNTER — PHARMACY VISIT (OUTPATIENT)
Dept: PHARMACY | Facility: MEDICAL CENTER | Age: 87
End: 2022-09-24
Payer: COMMERCIAL

## 2022-09-24 VITALS
TEMPERATURE: 97.9 F | RESPIRATION RATE: 18 BRPM | BODY MASS INDEX: 27.62 KG/M2 | HEIGHT: 63 IN | WEIGHT: 155.87 LBS | OXYGEN SATURATION: 92 % | DIASTOLIC BLOOD PRESSURE: 78 MMHG | SYSTOLIC BLOOD PRESSURE: 168 MMHG | HEART RATE: 94 BPM

## 2022-09-24 PROCEDURE — A9270 NON-COVERED ITEM OR SERVICE: HCPCS | Performed by: PHYSICIAN ASSISTANT

## 2022-09-24 PROCEDURE — 700102 HCHG RX REV CODE 250 W/ 637 OVERRIDE(OP): Performed by: PHYSICIAN ASSISTANT

## 2022-09-24 PROCEDURE — 99024 POSTOP FOLLOW-UP VISIT: CPT | Performed by: PHYSICIAN ASSISTANT

## 2022-09-24 RX ADMIN — ACETAMINOPHEN 1000 MG: 500 TABLET ORAL at 11:28

## 2022-09-24 RX ADMIN — POLYETHYLENE GLYCOL 3350 1 PACKET: 17 POWDER, FOR SOLUTION ORAL at 11:27

## 2022-09-24 RX ADMIN — OXYCODONE 5 MG: 5 TABLET ORAL at 11:27

## 2022-09-24 RX ADMIN — CEPHALEXIN 500 MG: 500 CAPSULE ORAL at 09:25

## 2022-09-24 RX ADMIN — ACETAMINOPHEN 1000 MG: 500 TABLET ORAL at 04:39

## 2022-09-24 RX ADMIN — BISACODYL 10 MG: 10 SUPPOSITORY RECTAL at 04:40

## 2022-09-24 RX ADMIN — CEPHALEXIN 500 MG: 500 CAPSULE ORAL at 13:31

## 2022-09-24 RX ADMIN — DOCUSATE SODIUM 100 MG: 100 CAPSULE, LIQUID FILLED ORAL at 04:39

## 2022-09-24 RX ADMIN — OMEPRAZOLE 20 MG: 20 CAPSULE, DELAYED RELEASE ORAL at 04:39

## 2022-09-24 ASSESSMENT — PAIN DESCRIPTION - PAIN TYPE
TYPE: SURGICAL PAIN
TYPE: SURGICAL PAIN

## 2022-09-24 NOTE — PROGRESS NOTES
"Neurosurgery  POD# 3  Some left thigh achiness/weakness  She is concerned about help at home at night  -she believes she can have overnight help the next two nights  Ambulating, voiding  -she has cleared PT  Tolerating oral medications  Denies nausea, vomiting, headache  Pain controlled on current medication regimen  Leg symptoms improved, still some ache in the legs with ambulating    Objective:  BP (!) 168/78   Pulse 94   Temp 36.6 °C (97.9 °F) (Temporal)   Resp 18   Ht 1.6 m (5' 3\")   Wt 70.7 kg (155 lb 13.8 oz)   SpO2 92%     Intake/Output Summary (Last 24 hours) at 9/22/2022 0808  Last data filed at 9/22/2022 0454  Gross per 24 hour   Intake 2440 ml   Output 2350 ml   Net 90 ml       Recent Labs     09/22/22  0547 09/23/22  0609   WBC 8.2 7.3   RBC 3.07* 3.10*   HEMOGLOBIN 9.6* 9.6*   HEMATOCRIT 29.3* 29.9*   MCV 95.4 96.5   MCH 31.3 31.0   MCHC 32.8* 32.1*   RDW 45.0 46.9   PLATELETCT 145* 134*   MPV 11.0 11.3     Recent Labs     09/22/22  0547 09/23/22  0609   SODIUM 136 141   POTASSIUM 4.7 4.4   CHLORIDE 106 110   CO2 24 25   GLUCOSE 121* 118*   BUN 18 17     Recent Labs     09/22/22  0547 09/23/22  0609   APTT 26.2 33.7   INR 1.17* 1.17*     VSS  Labs stable yesterday  Hemovac 50cc this am  Surgical incision clean, dry, intact, no evidence of infection  Strength:  Lower extremities are 4/5 left HF  -  motor 5/5 throughout remaining   Otherwise neurologically intact    Assessment:  Active Hospital Problems    Diagnosis     Back pain [M54.9]      Priority: High    Lumbar stenosis with neurogenic claudication [M48.062]     Spondylolisthesis, lumbar region [M43.16]      Added automatically from request for surgery [id number]       POD#3 S/p L3-5 laminectomy and fusion  Chemoprophylaxis: Lovenox started 9/22/2022    Plan:  1. Ambulate with PT/OT as tolerated - LSO when OOB and active  2. Meds to beds for scripts  3. D/C hemovac and OnQ today at 1300hrs  4. D/C home today at 1300hrs  "

## 2022-09-24 NOTE — DISCHARGE PLANNING
Case Management Discharge Planning    Admission Date: 9/21/2022  GMLOS: 2.4  ALOS: 2    6-Clicks ADL Score: 19  6-Clicks Mobility Score: 18      Anticipated Discharge Dispo:      DME Needed: No    Action(s) Taken: OTHER    SW met with patient after being informed that she had questions about obtaining overnight care/support. Patient stated that she has someone that would be able to assist her during the day on some days however there is no one to help her in the evenings. SW informed patient that having a live-in/overnight care giver would be an out of pocket expense. SW provided patient with a list of providers.     Escalations Completed: None    Medically Clear: No    Next Steps: SW will follow-up with patient as needs arise.     Barriers to Discharge: None    Is the patient up for discharge tomorrow: No

## 2022-09-24 NOTE — PROGRESS NOTES
Patient to be discharged today - patient aware and agreeable to plan. D/c instructions reviewed with patient - ?'s/concerns answered. Patient educated on pain regimen, s&s of infection, and mobility restrictions.  Meds to beds given to patient (4 meds).

## 2022-09-24 NOTE — DISCHARGE PLANNING
Case Management Discharge Planning    Admission Date: 9/21/2022  GMLOS: 2.4  ALOS: 3    6-Clicks ADL Score: 19  6-Clicks Mobility Score: 18      Anticipated Discharge Dispo: Discharge Disposition: Discharged to home/self care (01)    DME Needed: No    Action(s) Taken: OTHER    Escalations Completed: None    Medically Clear: Yes    Next Steps: Chart review completed to obtain the information used in this assessment. Pt is currently being treated for L3-4 and L4-5 decompressive laminectomy bilateral foraminotomies and L3-4-5 posterior lumbar instrumented fusion. Plan is for pt to ambulate with PT/OT as tolerated - LSO when OOB and active. D/C hemovac today. Pt met with BRODY on 9/21/2022 to discuss lack of care during the evening. BRODY provided pt with a list of providers that she was told she would have to pay out of pocket. Pt has DC order and is medically cleared. Pt may need Meds-2-Beds. RN CM will speak with team and f/u with discharge plan as it evolves.    Barriers to Discharge: None    Is the patient up for discharge tomorrow: Yes    Is transport arranged for discharge disposition: Yes    2:13pm - Pt's pharmacy co-pay was $13.37 and the bedside RN picked up the medications from pharmacy. No Approved Services form was needed/completed. Pt has Dc'd from hospital.

## 2022-09-24 NOTE — CARE PLAN
The patient is Stable - Low risk of patient condition declining or worsening    Shift Goals  Clinical Goals: comfort and mobility  Patient Goals: Rest  Family Goals: NA    Progress made toward(s) clinical / shift goals:    Problem: Pain - Standard  Goal: Alleviation of pain or a reduction in pain to the patient’s comfort goal  Outcome: Progressing     Problem: Knowledge Deficit - Standard  Goal: Patient and family/care givers will demonstrate understanding of plan of care, disease process/condition, diagnostic tests and medications  Outcome: Progressing     Problem: Fall Risk  Goal: Patient will remain free from falls  Outcome: Progressing       Patient is not progressing towards the following goals:

## 2022-09-24 NOTE — CARE PLAN
The patient is Stable - Low risk of patient condition declining or worsening    Shift Goals  Clinical Goals: pain control, ?PT/OT eval again  Patient Goals: safe discharge, pain control  Family Goals: n/a    Progress made toward(s) clinical / shift goals:  pain controlled with prn pain meds, On-q pump empty, will ask MD if it's ok to d/c, patient is worried about being discharged home with no help at night, will discuss with MD and case management today, no bm since 9/20, suppository given on prior shift, +bs    Patient is not progressing towards the following goals: n/a    Fall precautions/hourly rounding maintained, call light within reach and functioning, all items within reach.  Patient encouraged to call for assistance, poc reviewed with patient, ?'s/concerns answered.

## 2022-09-24 NOTE — DISCHARGE INSTRUCTIONS
Spinal Fusion, Adult, Care After  This sheet gives you information about how to care for yourself after your procedure. Your doctor may also give you more specific instructions. If you have problems or questions, contact your doctor.  Follow these instructions at home:  Medicines  Take over-the-counter and prescription medicines only as told by your doctor. These include any medicines for pain or blood-thinning medicines (anticoagulants).  If you were prescribed an antibiotic medicine, take it as told by your doctor. Do not stop taking the antibiotic even if you start to feel better.  Do not drive for 24 hours if you were given a medicine to help you relax (sedative) during your procedure.  Do not drive or use heavy machinery while taking prescription pain medicine.  If you have a brace:  Wear the brace as told by your doctor. Take it off only as told by your doctor.  Keep the brace clean.  Managing pain, stiffness, and swelling  If directed, put ice on the surgery area:  If you have a removable brace, take it off as told by your doctor.  Put ice in a plastic bag.  Place a towel between your skin and the bag.  Leave the ice on for 20 minutes, 2-3 times a day.  Surgery cut care    Follow instructions from your doctor about how to take care of your cut from surgery (incision). Make sure you:  Wash your hands with soap and water before you change your bandage (dressing). If you cannot use soap and water, use hand .  Change your bandage as told by your doctor.  Leave stitches (sutures), skin glue, or skin tape (adhesive) strips in place. They may need to stay in place for 2 weeks or longer. If tape strips get loose and curl up, you may trim the loose edges. Do not remove tape strips completely unless your doctor says it is okay.  Keep your cut from surgery clean and dry.  Do not take baths, swim, or use a hot tub until your doctor says it is okay.  Ask your doctor if you can take showers. You may only be  allowed to take sponge baths.  Every day, check your cut from surgery and the area around it for:  More redness, swelling, or pain.  Fluid or blood.  Warmth.  Pus or a bad smell.  If you have a drain tube, follow instructions from your doctor about caring for it. Do not take out the drain tube or any bandages unless your doctor says it is okay.  Physical activity  Rest and protect your back as much as possible.  Follow instructions from your doctor about how to move. Use good posture to help your spine heal.  Do not lift anything that is heavier than 8 lb (3.6 kg), or the limit that you are told, until your doctor says that it is safe.  Do not twist or bend at the waist until your doctor says it is okay.  It is best if you:  Do not make pushing and pulling motions.  Do not sit or lie down in the same position for a long time.  Do not raise your hands or arms above your head.  Return to your normal activities as told by your doctor. Ask your doctor what activities are safe for you. Rest and protect your back as much as you can.  Do not start to exercise until your doctor says it is okay. Ask your doctor what kinds of exercise you can do to make your back stronger.  General instructions  To prevent blood clots and lessen swelling in your legs:  Wear compression stockings as told.  Walk one or more times every few hours as told by your doctor.  Do not use any products that contain nicotine or tobacco, such as cigarettes and e-cigarettes. These can delay bone healing. If you need help quitting, ask your doctor.  To prevent or treat constipation while you are taking prescription pain medicine, your doctor may suggest that you:  Drink enough fluid to keep your pee (urine) pale yellow.  Take over-the-counter or prescription medicines.  Eat foods that are high in fiber. These include fresh fruits and vegetables, whole grains, and beans.  Limit foods that are high in fat and processed sugars, such as fried and sweet  foods.  Keep all follow-up visits as told by your doctor. This is important.  Contact a doctor if:  Your pain gets worse.  Your medicine does not help your pain.  Your legs or feet get painful or swollen.  Your cut from surgery is more red, swollen, or painful.  Your cut from surgery feels warm to the touch.  You have:  Fluid or blood coming from your cut from surgery.  Pus or a bad smell coming from your cut from surgery.  A fever.  Weakness or loss of feeling (numbness) in your legs that is new or getting worse.  Trouble controlling when you pee (urinate) or poop (have a bowel movement).  You feel sick to your stomach (nauseous).  You throw up (vomit).  Get help right away if:  Your pain is very bad.  You have chest pain.  You have trouble breathing.  You start to have a cough.  These symptoms may be an emergency. Do not wait to see if the symptoms will go away. Get medical help right away. Call your local emergency services (911 in the [place] Do not drive yourself to the hospital.  Summary  After the procedure, it is common to have pain in your back and pain by your surgery cut(s).  Icing and pain medicines may help to control the pain. Follow directions from your doctor.  Rest and protect your back as much as possible. Do not twist or bend at the waist.  Get up and walk one or more times every few hours as told by your doctor.  This information is not intended to replace advice given to you by your health care provider. Make sure you discuss any questions you have with your health care provider.  Document Released: 04/12/2012 Document Revised: 04/09/2020 Document Reviewed: 04/03/2018  Elsevier Patient Education © 2020 [employer]

## 2022-09-25 ENCOUNTER — HOSPITAL ENCOUNTER (INPATIENT)
Facility: MEDICAL CENTER | Age: 87
LOS: 3 days | DRG: 300 | End: 2022-09-28
Attending: EMERGENCY MEDICINE | Admitting: STUDENT IN AN ORGANIZED HEALTH CARE EDUCATION/TRAINING PROGRAM
Payer: MEDICARE

## 2022-09-25 ENCOUNTER — DOCUMENTATION (OUTPATIENT)
Dept: NEUROSURGERY | Facility: MEDICAL CENTER | Age: 87
End: 2022-09-25
Payer: MEDICARE

## 2022-09-25 ENCOUNTER — APPOINTMENT (OUTPATIENT)
Dept: RADIOLOGY | Facility: MEDICAL CENTER | Age: 87
DRG: 300 | End: 2022-09-25
Attending: EMERGENCY MEDICINE
Payer: MEDICARE

## 2022-09-25 DIAGNOSIS — K59.00 CONSTIPATION, UNSPECIFIED CONSTIPATION TYPE: ICD-10-CM

## 2022-09-25 DIAGNOSIS — I82.512 CHRONIC DEEP VEIN THROMBOSIS (DVT) OF FEMORAL VEIN OF LEFT LOWER EXTREMITY (HCC): ICD-10-CM

## 2022-09-25 DIAGNOSIS — M48.062 LUMBAR STENOSIS WITH NEUROGENIC CLAUDICATION: ICD-10-CM

## 2022-09-25 PROBLEM — I82.90 THROMBUS: Status: ACTIVE | Noted: 2022-09-25

## 2022-09-25 PROBLEM — Z71.89 ADVANCED CARE PLANNING/COUNSELING DISCUSSION: Status: ACTIVE | Noted: 2019-08-19

## 2022-09-25 PROCEDURE — 93971 EXTREMITY STUDY: CPT | Mod: LT

## 2022-09-25 PROCEDURE — 770020 HCHG ROOM/CARE - TELE (206)

## 2022-09-25 PROCEDURE — 99223 1ST HOSP IP/OBS HIGH 75: CPT | Mod: AI | Performed by: STUDENT IN AN ORGANIZED HEALTH CARE EDUCATION/TRAINING PROGRAM

## 2022-09-25 PROCEDURE — 99285 EMERGENCY DEPT VISIT HI MDM: CPT

## 2022-09-25 PROCEDURE — 700105 HCHG RX REV CODE 258: Performed by: EMERGENCY MEDICINE

## 2022-09-25 PROCEDURE — 36415 COLL VENOUS BLD VENIPUNCTURE: CPT

## 2022-09-25 RX ORDER — GABAPENTIN 300 MG/1
600 CAPSULE ORAL
Status: DISCONTINUED | OUTPATIENT
Start: 2022-09-26 | End: 2022-09-28 | Stop reason: HOSPADM

## 2022-09-25 RX ORDER — SODIUM CHLORIDE 9 MG/ML
500 INJECTION, SOLUTION INTRAVENOUS ONCE
Status: COMPLETED | OUTPATIENT
Start: 2022-09-25 | End: 2022-09-26

## 2022-09-25 RX ORDER — ENOXAPARIN SODIUM 100 MG/ML
40 INJECTION SUBCUTANEOUS EVERY 12 HOURS
Status: DISCONTINUED | OUTPATIENT
Start: 2022-09-26 | End: 2022-09-26

## 2022-09-25 RX ORDER — OXYCODONE HYDROCHLORIDE 5 MG/1
5 TABLET ORAL EVERY 4 HOURS PRN
Status: DISCONTINUED | OUTPATIENT
Start: 2022-09-25 | End: 2022-09-28 | Stop reason: HOSPADM

## 2022-09-25 RX ORDER — ATORVASTATIN CALCIUM 10 MG/1
10 TABLET, FILM COATED ORAL EVERY EVENING
Status: DISCONTINUED | OUTPATIENT
Start: 2022-09-26 | End: 2022-09-28 | Stop reason: HOSPADM

## 2022-09-25 RX ORDER — TIZANIDINE 4 MG/1
2 TABLET ORAL 3 TIMES DAILY PRN
Status: DISCONTINUED | OUTPATIENT
Start: 2022-09-25 | End: 2022-09-28 | Stop reason: HOSPADM

## 2022-09-25 RX ADMIN — SODIUM CHLORIDE 500 ML: 9 INJECTION, SOLUTION INTRAVENOUS at 22:20

## 2022-09-25 ASSESSMENT — ENCOUNTER SYMPTOMS
EYES NEGATIVE: 1
MUSCULOSKELETAL NEGATIVE: 1
WEAKNESS: 1
RESPIRATORY NEGATIVE: 1
GASTROINTESTINAL NEGATIVE: 1
PSYCHIATRIC NEGATIVE: 1

## 2022-09-25 ASSESSMENT — PAIN DESCRIPTION - PAIN TYPE: TYPE: ACUTE PAIN

## 2022-09-25 ASSESSMENT — FIBROSIS 4 INDEX: FIB4 SCORE: 2.38

## 2022-09-26 ENCOUNTER — APPOINTMENT (OUTPATIENT)
Dept: RADIOLOGY | Facility: MEDICAL CENTER | Age: 87
DRG: 300 | End: 2022-09-26
Attending: PHYSICIAN ASSISTANT
Payer: MEDICARE

## 2022-09-26 ENCOUNTER — APPOINTMENT (OUTPATIENT)
Dept: RADIOLOGY | Facility: MEDICAL CENTER | Age: 87
DRG: 300 | End: 2022-09-26
Attending: HOSPITALIST
Payer: MEDICARE

## 2022-09-26 PROCEDURE — 770020 HCHG ROOM/CARE - TELE (206)

## 2022-09-26 PROCEDURE — 700117 HCHG RX CONTRAST REV CODE 255: Performed by: HOSPITALIST

## 2022-09-26 PROCEDURE — 99152 MOD SED SAME PHYS/QHP 5/>YRS: CPT

## 2022-09-26 PROCEDURE — A9270 NON-COVERED ITEM OR SERVICE: HCPCS | Performed by: HOSPITALIST

## 2022-09-26 PROCEDURE — 700102 HCHG RX REV CODE 250 W/ 637 OVERRIDE(OP): Performed by: STUDENT IN AN ORGANIZED HEALTH CARE EDUCATION/TRAINING PROGRAM

## 2022-09-26 PROCEDURE — 700102 HCHG RX REV CODE 250 W/ 637 OVERRIDE(OP): Performed by: HOSPITALIST

## 2022-09-26 PROCEDURE — 700111 HCHG RX REV CODE 636 W/ 250 OVERRIDE (IP): Performed by: STUDENT IN AN ORGANIZED HEALTH CARE EDUCATION/TRAINING PROGRAM

## 2022-09-26 PROCEDURE — 700111 HCHG RX REV CODE 636 W/ 250 OVERRIDE (IP)

## 2022-09-26 PROCEDURE — 06H03DZ INSERTION OF INTRALUMINAL DEVICE INTO INFERIOR VENA CAVA, PERCUTANEOUS APPROACH: ICD-10-PCS | Performed by: RADIOLOGY

## 2022-09-26 PROCEDURE — 72148 MRI LUMBAR SPINE W/O DYE: CPT

## 2022-09-26 PROCEDURE — 99024 POSTOP FOLLOW-UP VISIT: CPT | Performed by: PHYSICIAN ASSISTANT

## 2022-09-26 PROCEDURE — 99232 SBSQ HOSP IP/OBS MODERATE 35: CPT | Performed by: HOSPITALIST

## 2022-09-26 PROCEDURE — A9270 NON-COVERED ITEM OR SERVICE: HCPCS | Performed by: STUDENT IN AN ORGANIZED HEALTH CARE EDUCATION/TRAINING PROGRAM

## 2022-09-26 RX ORDER — IODIXANOL 270 MG/ML
60 INJECTION, SOLUTION INTRAVASCULAR ONCE
Status: COMPLETED | OUTPATIENT
Start: 2022-09-26 | End: 2022-09-26

## 2022-09-26 RX ORDER — SODIUM CHLORIDE 9 MG/ML
500 INJECTION, SOLUTION INTRAVENOUS
Status: ACTIVE | OUTPATIENT
Start: 2022-09-26 | End: 2022-09-26

## 2022-09-26 RX ORDER — MIDAZOLAM HYDROCHLORIDE 1 MG/ML
INJECTION INTRAMUSCULAR; INTRAVENOUS
Status: COMPLETED
Start: 2022-09-26 | End: 2022-09-26

## 2022-09-26 RX ORDER — ENOXAPARIN SODIUM 100 MG/ML
40 INJECTION SUBCUTANEOUS DAILY
Status: DISCONTINUED | OUTPATIENT
Start: 2022-09-27 | End: 2022-09-28 | Stop reason: HOSPADM

## 2022-09-26 RX ORDER — ONDANSETRON 2 MG/ML
4 INJECTION INTRAMUSCULAR; INTRAVENOUS PRN
Status: ACTIVE | OUTPATIENT
Start: 2022-09-26 | End: 2022-09-26

## 2022-09-26 RX ORDER — DOCUSATE CALCIUM 240 MG
240 CAPSULE ORAL DAILY
Status: DISCONTINUED | OUTPATIENT
Start: 2022-09-26 | End: 2022-09-28 | Stop reason: HOSPADM

## 2022-09-26 RX ORDER — SENNOSIDES A AND B 8.6 MG/1
17.2 TABLET, FILM COATED ORAL
Status: DISCONTINUED | OUTPATIENT
Start: 2022-09-26 | End: 2022-09-28 | Stop reason: HOSPADM

## 2022-09-26 RX ORDER — MIDAZOLAM HYDROCHLORIDE 1 MG/ML
.5-2 INJECTION INTRAMUSCULAR; INTRAVENOUS PRN
Status: ACTIVE | OUTPATIENT
Start: 2022-09-26 | End: 2022-09-26

## 2022-09-26 RX ORDER — POLYETHYLENE GLYCOL 3350 17 G/17G
1 POWDER, FOR SOLUTION ORAL DAILY
Status: DISCONTINUED | OUTPATIENT
Start: 2022-09-26 | End: 2022-09-28 | Stop reason: HOSPADM

## 2022-09-26 RX ORDER — ACETAMINOPHEN 325 MG/1
650 TABLET ORAL EVERY 4 HOURS PRN
Status: DISCONTINUED | OUTPATIENT
Start: 2022-09-26 | End: 2022-09-28 | Stop reason: HOSPADM

## 2022-09-26 RX ADMIN — GABAPENTIN 600 MG: 300 CAPSULE ORAL at 20:37

## 2022-09-26 RX ADMIN — ACETAMINOPHEN 650 MG: 325 TABLET, FILM COATED ORAL at 14:23

## 2022-09-26 RX ADMIN — OXYCODONE 5 MG: 5 TABLET ORAL at 00:57

## 2022-09-26 RX ADMIN — ENOXAPARIN SODIUM 40 MG: 40 INJECTION SUBCUTANEOUS at 00:57

## 2022-09-26 RX ADMIN — POLYETHYLENE GLYCOL 3350 1 PACKET: 17 POWDER, FOR SOLUTION ORAL at 18:12

## 2022-09-26 RX ADMIN — IODIXANOL 60 ML: 270 INJECTION, SOLUTION INTRAVASCULAR at 12:45

## 2022-09-26 RX ADMIN — MIDAZOLAM HYDROCHLORIDE 1 MG: 1 INJECTION INTRAMUSCULAR; INTRAVENOUS at 11:49

## 2022-09-26 RX ADMIN — MIDAZOLAM HYDROCHLORIDE 1 MG: 1 INJECTION, SOLUTION INTRAMUSCULAR; INTRAVENOUS at 11:49

## 2022-09-26 RX ADMIN — OXYCODONE 5 MG: 5 TABLET ORAL at 10:31

## 2022-09-26 RX ADMIN — TIZANIDINE 2 MG: 4 TABLET ORAL at 14:23

## 2022-09-26 RX ADMIN — OXYCODONE 5 MG: 5 TABLET ORAL at 20:37

## 2022-09-26 RX ADMIN — ATORVASTATIN CALCIUM 10 MG: 10 TABLET, FILM COATED ORAL at 18:13

## 2022-09-26 ASSESSMENT — PATIENT HEALTH QUESTIONNAIRE - PHQ9
SUM OF ALL RESPONSES TO PHQ9 QUESTIONS 1 AND 2: 0
1. LITTLE INTEREST OR PLEASURE IN DOING THINGS: NOT AT ALL
2. FEELING DOWN, DEPRESSED, IRRITABLE, OR HOPELESS: NOT AT ALL

## 2022-09-26 ASSESSMENT — COGNITIVE AND FUNCTIONAL STATUS - GENERAL
MOVING FROM LYING ON BACK TO SITTING ON SIDE OF FLAT BED: A LITTLE
CLIMB 3 TO 5 STEPS WITH RAILING: A LITTLE
DAILY ACTIVITIY SCORE: 20
DRESSING REGULAR LOWER BODY CLOTHING: A LITTLE
TOILETING: A LITTLE
MOVING TO AND FROM BED TO CHAIR: A LITTLE
SUGGESTED CMS G CODE MODIFIER DAILY ACTIVITY: CJ
HELP NEEDED FOR BATHING: A LITTLE
STANDING UP FROM CHAIR USING ARMS: A LITTLE
MOBILITY SCORE: 18
SUGGESTED CMS G CODE MODIFIER MOBILITY: CK
TURNING FROM BACK TO SIDE WHILE IN FLAT BAD: A LITTLE
DRESSING REGULAR UPPER BODY CLOTHING: A LITTLE
WALKING IN HOSPITAL ROOM: A LITTLE

## 2022-09-26 ASSESSMENT — LIFESTYLE VARIABLES
EVER FELT BAD OR GUILTY ABOUT YOUR DRINKING: NO
ON A TYPICAL DAY WHEN YOU DRINK ALCOHOL HOW MANY DRINKS DO YOU HAVE: 0
TOTAL SCORE: 0
AVERAGE NUMBER OF DAYS PER WEEK YOU HAVE A DRINK CONTAINING ALCOHOL: 0
HAVE PEOPLE ANNOYED YOU BY CRITICIZING YOUR DRINKING: NO
CONSUMPTION TOTAL: NEGATIVE
TOTAL SCORE: 0
HOW MANY TIMES IN THE PAST YEAR HAVE YOU HAD 5 OR MORE DRINKS IN A DAY: 0
HAVE YOU EVER FELT YOU SHOULD CUT DOWN ON YOUR DRINKING: NO
EVER HAD A DRINK FIRST THING IN THE MORNING TO STEADY YOUR NERVES TO GET RID OF A HANGOVER: NO
TOTAL SCORE: 0
ALCOHOL_USE: NO

## 2022-09-26 ASSESSMENT — PAIN SCALES - WONG BAKER: WONGBAKER_NUMERICALRESPONSE: DOESN'T HURT AT ALL

## 2022-09-26 ASSESSMENT — PAIN DESCRIPTION - PAIN TYPE
TYPE: ACUTE PAIN

## 2022-09-26 ASSESSMENT — FIBROSIS 4 INDEX
FIB4 SCORE: 2.38
FIB4 SCORE: 2.38

## 2022-09-26 NOTE — PROGRESS NOTES
[facility] Medicine Daily Progress Note    Date of Service  9/26/2022    Chief Complaint  Marry Mathur is a 87 y.o. female admitted 9/25/2022 with leg swelling    Hospital Course  No notes on file    Interval Problem Update  No acute overnight events, patient has ongoing swelling and pain to her left lower extremity.  No chest discomfort or dyspnea.    Consultants/Specialty  neurosurgery    Code Status  Full Code    Disposition  Patient is not medically cleared for discharge.   Anticipate discharge to to home with close outpatient follow-up.  I have placed the appropriate orders for post-discharge needs.    Review of Systems  All systems reviewed and negative except as noted per above.    Physical Exam  Temp:  [36.1 °C (97 °F)-37 °C (98.6 °F)] 37 °C (98.6 °F)  Pulse:  [] 95  Resp:  [14-20] 18  BP: ()/(47-92) 130/72  SpO2:  [83 %-100 %] 95 %    General: No acute distress  HEENT atraumatic, normocephalic, pupils equal round reactive to light  Neck: No JVD  Chest: Respirations are unlabored  Cardiac: Physiologic S1 and S2  Abdomen: Soft, nontender, nondistended  Extremities: Without clubbing, cyanosis, left calf with tenderness to palpitation and 1-2+ pitting edema  Neuro: Cranial nerves II through XII are grossly intact.  Psych: No anxiety, judgement intact.        Current Facility-Administered Medications:     acetaminophen (Tylenol) tablet 650 mg, 650 mg, Oral, Q4HRS PRN, Dash Cordon M.D., 650 mg at 09/26/22 1423    FENTANYL CITRATE (PF) 0.05 MG/ML INJ SOLN (WRAPPED), , , ,     docusate calcium (SURFAK) capsule 240 mg, 240 mg, Oral, DAILY, Dash Cordon M.D.    polyethylene glycol/lytes (MIRALAX) PACKET 1 Packet, 1 Packet, Oral, DAILY, Dash Cordon M.D.    sennosides (SENOKOT) 8.6 MG tablet 17.2 mg, 17.2 mg, Oral, QHS, Dash Cordon M.D.    enoxaparin (Lovenox) inj 40 mg, 40 mg, Subcutaneous, Q12HRS, Selwyn Waters M.D., 40 mg at 09/26/22 0057    atorvastatin (LIPITOR) tablet 10  "mg, 10 mg, Oral, Q EVENING, Selwyn Waters M.D.    gabapentin (NEURONTIN) capsule 600 mg, 600 mg, Oral, QHS, Selwyn Waters M.D.    oxyCODONE immediate-release (ROXICODONE) tablet 5 mg, 5 mg, Oral, Q4HRS PRN, Selwyn Waters M.D., 5 mg at 09/26/22 1031    tizanidine (ZANAFLEX) tablet 2 mg, 2 mg, Oral, TID PRN, Selwyn Waters M.D., 2 mg at 09/26/22 1423      Fluids    Intake/Output Summary (Last 24 hours) at 9/26/2022 1507  Last data filed at 9/26/2022 0700  Gross per 24 hour   Intake 500 ml   Output 500 ml   Net 0 ml       Laboratory                        Imaging  IR-INSERT IVC FILTER WITH IG & SI   Final Result      1. Ultrasound and fluoroscopic guided placement of a Argon Option retrievable caval filter in the infrarenal IVC.   The Argon Option retrievable filter is suitable for removal under usual circumstances to 180 days (6 months) and sometimes up to one year and longer. This filter may also be used as a permanent filter. Please consult Interventional Radiology for filter    removal if indicated.      2. Inferior vena cavagram within normal limits with no evidence of caval thrombus or occlusion.      US-EXTREMITY VENOUS LOWER UNILAT LEFT   Final Result      MR-LUMBAR SPINE-W/O    (Results Pending)        Assessment/Plan  * Thrombus  Assessment & Plan  Admit patient to neurosurgery floor  Ultrasound shows chronic, partially occlusive thrombus  Neurosurgery on board, recommends Lovenox twice daily and IVC filter in a.m.  At this time not a candidate for therapeutic anticoagulation due to recent surgery  IR consulted 9/26 for IVC filter placement, and on 9/26 patient underwent \" Ultrasound and fluoroscopic guided placement of a Argon Option retrievable caval filter in the infrarenal IVC. The Argon Option retrievable filter is suitable for removal under usual circumstances to 180 days (6 months) and sometimes up to one year and longer. This filter may also be used as a permanent filter. Please " "consult Interventional Radiology for filter removal if indicated.\"    Given that IVC filter is now in place,Enoxaparin will be reduced from twice daily to once daily 40 mg.  Full dose anticoagulation can be initiated after a few more days of healing from her neurosurgical intervention.    Spondylolisthesis, lumbar region- (present on admission)  Assessment & Plan  Currently postop day 4  Physical therapy Occupational Therapy  Gabapentin    Advanced care planning/counseling discussion  Assessment & Plan  Goals of cares discussed with patient and friend at bedside.  Full code.    Hyperlipidemia- (present on admission)  Assessment & Plan  Continue lipitor                "

## 2022-09-26 NOTE — ED NOTES
Ptt wheeled to bedside. Fatigued and helped into bed. Pt placed on monitor and call light in reach.

## 2022-09-26 NOTE — OR SURGEON
Immediate Post- Operative Note      PostOp Diagnosis: DVT, POST OP LUMBAR SPINE SURGERY    Procedure(s): RIGHT COMMON FEMORAL PUNCTURE WITH U/S GUIDANCE, IVC GRAM, INFRARENAL PLACEMENT  IVC FILTER PLACEMENT (ARGON OPTION)      Estimated Blood Loss: <20CC (FLUSHES)      FINDINGS: IVC PATENT. IVC FILTER IN GOOD POSITION.         Complications: NONE          9/26/2022     12:30 PM     Omar Simons M.D.

## 2022-09-26 NOTE — THERAPY
Missed Therapy     Patient Name: Marry Mathur  Age:  87 y.o., Sex:  female  Medical Record #: [id number]  Today's Date: 9/26/2022    Discussed missed therapy with nursing       09/26/22 1100   Interdisciplinary Plan of Care Collaboration   Collaboration Comments PT orders received and chart review performed.  Checked in with patient to assess how she was doing at home after her recent admit. She reports that she was having difficulty mobilizing and spending more time in bed. Reviewed her spine precautions and that we will reassess her after her IVC filter placement today.  Patient in agreement with this plan     Lucia Sung, PT, DPT, G[facility]

## 2022-09-26 NOTE — H&P
Hospital Medicine History & Physical Note    Date of Service  9/25/2022    Primary Care Physician  KINSEY Horner.    Consultants  neurosurgery      Code Status  Full Code    Chief Complaint  Chief Complaint   Patient presents with    Leg Swelling     L leg swelling.  States usually on xarelto, but stopped taking d/t back surgery.  Pt has been taking the lovenox injections as ordered post surgery.  Pt does have a hx of blood clots. +CMS to the [facility]       History of Presenting Illness  Marry Mathur is a 87 y.o. female who presented 9/25/2022 with left lower extremity pain and swelling.    Patient has a history of DVTs.  She stopped her anticoagulation for a recent L3-L4 and L4-L5 decompressive laminectomy, bilateral foraminotomies and L3-L5 posterior lumbar instrumented fusion.  She is currently postop day 4.    Since patient has been home, she is reported increased left lower extremity pain and swelling.  She states that it makes it difficult for her to ambulate around the house.  Denies chest pain shortness of breath headache nausea vomiting.    In the ED, patient found to have normal vital signs.  Ultrasound of left lower extremity shows chronic, partially occlusive thrombus with flow recanalization in the left mid femoral vein.  Neurosurgery on board, recommends IVC filter placed and increase Lovenox to 40 mg twice daily as she is not cleared for therapeutic anticoagulation from neurosurgery at this point.    I discussed the plan of care with patient.    Review of Systems  Review of Systems   Constitutional:  Positive for malaise/fatigue.   HENT: Negative.     Eyes: Negative.    Respiratory: Negative.     Cardiovascular:  Positive for leg swelling.   Gastrointestinal: Negative.    Genitourinary: Negative.    Musculoskeletal: Negative.    Skin: Negative.    Neurological:  Positive for weakness.   Endo/Heme/Allergies: Negative.    Psychiatric/Behavioral: Negative.       Past Medical  History   has a past medical history of Adenocarcinoma of colon (HCC) (10/30/2018), Anesthesia, Atrial fibrillation [I48.91] (04/19/2016), Back pain (06/01/2022), Blood clotting disorder ([facility]) (2012), Bowel habit changes, Cancer ([facility]), Cataract, Chronic back pain greater than 3 months duration, Deep vein thrombosis ([facility]) (03/08/2016), Essential hypertension, benign (06/27/2012), GERD (gastroesophageal reflux disease) (06/27/2012), Heart murmur, High cholesterol, Hyperlipidemia, Hypertension, Impaired fasting glucose (11/02/2017), Melanoma ([facility]), Mild aortic stenosis, Other and unspecified hyperlipidemia (06/27/2012), Prediabetes, Protein S deficiency ([facility]) (11/02/2017), Rheumatoid arthritis involving multiple sites with positive rheumatoid factor ([facility]) (03/14/2016), Rheumatoid nodule ([facility]) (07/25/2017), Right bundle branch block (06/27/2012), and Urinary incontinence (11/02/2017).    Surgical History   has a past surgical history that includes hysterectomy, total abdominal (1970's); arthroplasty (Left); cholecystectomy; other orthopedic surgery (2011); inguinal hernia repair (Right, 09/23/2016); other (08/12/2014); rotator cuff repair (Bilateral); hemicolectomy (Right, 12/13/2018); irrigation & debridement ortho (Bilateral, 07/31/2020); lumbar medial branch blocks (Bilateral, 12/15/2020); wide excision, lesion, head and neck region (Left, 03/29/2022); block epidural steroid injection (Right, 05/10/2022); knee arthroplasty total (Left); block epidural steroid injection (Right, 5/31/2022); pr remove armpits lymph nodes complt (Left, 6/7/2022); pr injection,sacroiliac joint (Right, 7/12/2022); lumbar fusion o-arm (9/21/2022); lumbar decompression (9/21/2022); lumbar laminectomy diskectomy (9/21/2022); and foraminotomy (9/21/2022).     Family History  family history includes Cancer in her father and mother; Heart Disease in her brother and sister; Leukemia in her father.   Family history reviewed with patient. There is  "no family history that is pertinent to the chief complaint.     Social History   reports that she has never smoked. She has never used smokeless tobacco. She reports that she does not currently use alcohol. She reports that she does not currently use drugs.    Allergies  Allergies   Allergen Reactions    Wound Dressing Adhesive      Pt says band-aids cause skin reaction/rash if on for more than 2 days  Paper tape OK         Medications  Prior to Admission Medications   Prescriptions Last Dose Informant Patient Reported? Taking?   Calcium Carbonate (CALCIUM 600 PO)  Patient Yes No   Sig: Take 300 mg by mouth.   Multiple Vitamins-Minerals (PRESERVISION AREDS PO)  Patient Yes No   Sig: Take  by mouth.   NON SPECIFIED  Patient No No   Sig: LLD. Right 1/2\"  Limb length difference.   SODIUM FLUORIDE 5000 PLUS 1.1 % Cream  Patient Yes No   Sig: BRUSH WITH PEA SIZED AMOUNT EVERY DAY PREFERABLY BEFORE BEDTIME. SPIT OUT ALL EXCESS. DO NOT RINSE   acetaminophen (TYLENOL) 500 MG Tab   No No   Sig: Take 2 Tablets by mouth every 6 hours.   atorvastatin (LIPITOR) 10 MG Tab  Patient No No   Sig: Take 1 Tablet by mouth every evening.   cephALEXin (KEFLEX) 500 MG Cap   No No   Sig: Take 1 Capsule by mouth 4 times a day for 5 days.   enoxaparin (LOVENOX) 40 MG/0.4ML Solution Prefilled Syringe inj  Patient No No   Sig: Inject 40 mg under the skin every day for 21 days. Start today 9/14/2022 last day 9/19/2022. Then to restart on 9/23/2022.   gabapentin (NEURONTIN) 300 MG Cap  Patient No No   Sig: Take 2 Capsules by mouth at bedtime.   lidocaine (LIDODERM) 5 % Patch  Patient No No   Sig: APPLY ONE PATCH TO CLEAN DRY SKIN AS DIRECTED DAILY   melatonin 5 mg Tab  Patient Yes No   Sig: Take 10 mg by mouth every evening.   omeprazole (PRILOSEC) 20 MG delayed-release capsule  Patient No No   Sig: Take 1 Capsule by mouth every day.   oxyCODONE immediate-release (ROXICODONE) 5 MG Tab   No No   Sig: Take 1 Tablet by mouth every four hours as " needed for Severe Pain for up to 7 days.   polyethylene glycol/lytes (MIRALAX) 17 g Pack  Patient Yes No   Sig: Take 17 g by mouth every day. 1/2 TSP AM   tizanidine (ZANAFLEX) 2 MG tablet   No No   Sig: Take 1 Tablet by mouth 3 times a day as needed (muscle spasm).   vitamin D (CHOLECALCIFEROL) 1000 UNIT Tab  Patient Yes No   Sig: Take 500 Units by mouth every morning.      Facility-Administered Medications: None       Physical Exam  Temp:  [36.6 °C (97.8 °F)] 36.6 °C (97.8 °F)  Pulse:  [66-86] 66  Resp:  [18] 18  BP: ()/(47-54) 100/54  SpO2:  [91 %-97 %] 97 %  Blood Pressure : 100/54   Temperature: 36.6 °C (97.8 °F)   Pulse: 66   Respiration: 18   Pulse Oximetry: 97 %       Physical Exam  Constitutional:       Appearance: Normal appearance. She is normal weight.   HENT:      Head: Normocephalic.      Nose: Nose normal.      Mouth/Throat:      Mouth: Mucous membranes are moist.   Eyes:      Pupils: Pupils are equal, round, and reactive to light.   Cardiovascular:      Rate and Rhythm: Normal rate and regular rhythm.   Pulmonary:      Effort: Pulmonary effort is normal.      Breath sounds: Normal breath sounds.   Abdominal:      General: Abdomen is flat. Bowel sounds are normal.      Palpations: Abdomen is soft.   Skin:     Comments: Left lower extremity larger than right lower extremity.  Chronic venous changes noted bilaterally.  Dorsalis pedis 1+ bilaterally.   Neurological:      General: No focal deficit present.      Mental Status: She is alert and oriented to person, place, and time. Mental status is at baseline.   Psychiatric:         Mood and Affect: Mood normal.         Behavior: Behavior normal.         Thought Content: Thought content normal.         Judgment: Judgment normal.       Laboratory:  Recent Labs     09/23/22  0609   WBC 7.3   RBC 3.10*   HEMOGLOBIN 9.6*   HEMATOCRIT 29.9*   MCV 96.5   MCH 31.0   MCHC 32.1*   RDW 46.9   PLATELETCT 134*   MPV 11.3     Recent Labs     09/23/22  0609    SODIUM 141   POTASSIUM 4.4   CHLORIDE 110   CO2 25   GLUCOSE 118*   BUN 17   CREATININE 0.46*   CALCIUM 7.3*     Recent Labs     09/23/22  0609   GLUCOSE 118*     Recent Labs     09/23/22  0609   APTT 33.7   INR 1.17*     No results for input(s): NTPROBNP in the last 72 hours.      No results for input(s): TROPONINT in the last 72 hours.    Imaging:  US-EXTREMITY VENOUS LOWER UNILAT LEFT             no X-Ray or EKG requiring interpretation    Assessment/Plan:  Justification for Admission Status  I anticipate this patient will require at least two midnights for appropriate medical management, necessitating inpatient admission because patient has a chronic DVT and will need her Lovenox increased as she is not a candidate for therapeutic anticoagulation at this time.  She will need IVC filter placed      * Thrombus  Assessment & Plan  Admit patient to neurosurgery floor  Ultrasound shows chronic, partially occlusive thrombus  Neurosurgery on board, recommends Lovenox twice daily and IVC filter in a.m.  At this time not a candidate for therapeutic anticoagulation due to recent surgery  IR consult in a.m. for IVC filter placement    Spondylolisthesis, lumbar region- (present on admission)  Assessment & Plan  Currently postop day 4  Physical therapy Occupational Therapy  Gabapentin    Advanced care planning/counseling discussion  Assessment & Plan  Goals of cares discussed with patient and friend at bedside.  Full code.    Hyperlipidemia- (present on admission)  Assessment & Plan  Continue lipitor       VTE prophylaxis: enoxaparin ppx

## 2022-09-26 NOTE — THERAPY
Missed Therapy     Patient Name: Marry Mathur  Age:  87 y.o., Sex:  female  Medical Record #: [id number]  Today's Date: 9/26/2022    Discussed missed therapy with Evan       09/26/22 1212   Interdisciplinary Plan of Care Collaboration   Collaboration Comments OT eval attempted, pt getting IVC filter placed today, will attempt OT eval in am

## 2022-09-26 NOTE — CONSULTS
87F  96 hours/POD #4 for lumbar laminectomy/fusion.   She reports some pain to the touch with swollen left calf. She has a history of DVTs and did take 40mg lovenox injection today. This was started POD#1.      If she has a DVT. she is not clear for full anticoagulation from Neurosurgery standpoint.      If she is positive for DVT NSX is recommending caval filter and 40mg lovenox BID.     Plan:  Duplex/ u/s caves  If positive for DVT: recommend admit medicine, caval filter and increase lovenox to 40mg PO BID as she is not cleared for full anticoagulation from NSX standpoint. POD #4 multilevel lami/fusion  Full anticoagulation on POD #10 if needed        We will follow.          Rosa M Bonner MD, PhD, DK  Neurosurgeon  ? [phone]  Fax: [phone] [address] [place] [address]  [email or web address]  [email or web address]

## 2022-09-26 NOTE — PROGRESS NOTES
I took a phone call from the patient with worsening swelling in the left leg. She is POD #4 for lumbar laminectomy/fusion. She reports some pain to the touch. She has a history of DVTs and did take l40mg lovenox injection today. I instructed her to report to the ER at this time. She is agreeable to this plan.     Case discussed with Dr. Bonner. She is not clear for full anticoagulation from Neurosurgery standpoint.      If she is positive for DVT NSX is recommending caval filter and 40mg lovenox BID.     Plan:  Patient to report to ER at this time  If positive for DVT: recommend caval filter and increase lovenox to 40mg PO BID as she is not cleared for full anticoagulation from NSX standpoint. POD #4 multilevel lami/fusion  Full anticoagulation on POD #10 if needed

## 2022-09-26 NOTE — PROGRESS NOTES
Pt to and from IR procedure with transport. DARBY Martini notified of pt being unable to complete stat MRI immediately since pt to IR first-ok to complete filter prior to CONCEPCION. Pt brought back to room, orders and post op orders released. Site:CDI soft no signs of bleeding or hematoma. Pt +2 pedal pulses bilaterally, pt does have bilateral cold extremities. MRI screening form completed. MRI called and notified of pt being ready for scan. Pt to be picked up around 1430 for MRI

## 2022-09-26 NOTE — ED PROVIDER NOTES
ED Provider Note    Chief Concern:   Left lower extremity swelling    HPI:  Marry Mathur is a very pleasant 87-year-old woman who presents to the emergency department for evaluation of left leg pain and swelling.  She is postop day 4 from lumbar laminectomy/fusion that was performed by Dr. Bonner.  She had been doing well, however she has noticed that her left calf started hurting today.  She has a history of prior DVT, was anticoagulated however discontinued her DOAC anticoagulation and switch to Lovenox prior to the surgical procedure.  She has been taking Lovenox twice daily, and has not missed any doses.  When she took off her pants this evening she noticed that the left leg was swollen, she had persistent calf pain she contacted her surgeon's office.  She was instructed to come to the emergency department to evaluate for evidence of DVT.  She reports no chest pain, no shortness of breath, she states that she is doing well otherwise postop.  She does not have any symptoms in the right lower extremity.  She is had no fevers, no overlying skin changes, no redness, no cellulitis.  She is mildly hypotensive on arrival to the emergency department, and states that she did have some episodes of hypotension prior to being discharged from her surgical procedure.  She is not currently on any antihypertensive medications.  She is not able to identify any reliably alleviating factors, her pain does seem to be exacerbated by weightbearing.    Review of Systems:  See HPI for pertinent positives and negatives. All other systems negative.    Past Medical History:   has a past medical history of Adenocarcinoma of colon (HCC) (10/30/2018), Anesthesia, Atrial fibrillation [I48.91] (04/19/2016), Back pain (06/01/2022), Blood clotting disorder (HCC) (2012), Bowel habit changes, Cancer (HCC), Cataract, Chronic back pain greater than 3 months duration, Deep vein thrombosis (HCC) (03/08/2016), Essential hypertension, benign  (06/27/2012), GERD (gastroesophageal reflux disease) (06/27/2012), Heart murmur, High cholesterol, Hyperlipidemia, Hypertension, Impaired fasting glucose (11/02/2017), Melanoma ([facility]), Mild aortic stenosis, Other and unspecified hyperlipidemia (06/27/2012), Prediabetes, Protein S deficiency (HCC) (11/02/2017), Rheumatoid arthritis involving multiple sites with positive rheumatoid factor ([facility]) (03/14/2016), Rheumatoid nodule ([facility]) (07/25/2017), Right bundle branch block (06/27/2012), and Urinary incontinence (11/02/2017).    Social History:  Social History     Tobacco Use    Smoking status: Never    Smokeless tobacco: Never   Vaping Use    Vaping Use: Never used   Substance and Sexual Activity    Alcohol use: Not Currently     Comment: very rare    Drug use: Not Currently    Sexual activity: Not Currently     Partners: Male     Comment:         Surgical History:   has a past surgical history that includes hysterectomy, total abdominal (1970's); arthroplasty (Left); cholecystectomy; other orthopedic surgery (2011); inguinal hernia repair (Right, 09/23/2016); other (08/12/2014); rotator cuff repair (Bilateral); hemicolectomy (Right, 12/13/2018); irrigation & debridement ortho (Bilateral, 07/31/2020); lumbar medial branch blocks (Bilateral, 12/15/2020); wide excision, lesion, head and neck region (Left, 03/29/2022); block epidural steroid injection (Right, 05/10/2022); knee arthroplasty total (Left); block epidural steroid injection (Right, 5/31/2022); remove armpits lymph nodes complt (Left, 6/7/2022); injection,sacroiliac joint (Right, 7/12/2022); lumbar fusion o-arm (9/21/2022); lumbar decompression (9/21/2022); lumbar laminectomy diskectomy (9/21/2022); and foraminotomy (9/21/2022).    Current Medications:  Home Medications       Reviewed by Danay Das R.N. (Registered Nurse) on 09/25/22 at 2110  Med List Status: Partial     Medication Last Dose Status   acetaminophen (TYLENOL) 500 MG Tab  Active  "  atorvastatin (LIPITOR) 10 MG Tab  Active   Calcium Carbonate (CALCIUM 600 PO)  Active   cephALEXin (KEFLEX) 500 MG Cap  Active   enoxaparin (LOVENOX) 40 MG/0.4ML Solution Prefilled Syringe inj  Active   gabapentin (NEURONTIN) 300 MG Cap  Active   lidocaine (LIDODERM) 5 % Patch  Active   melatonin 5 mg Tab  Active   Multiple Vitamins-Minerals (PRESERVISION AREDS PO)  Active   NON SPECIFIED  Active   omeprazole (PRILOSEC) 20 MG delayed-release capsule  Active   oxyCODONE immediate-release (ROXICODONE) 5 MG Tab  Active   polyethylene glycol/lytes (MIRALAX) 17 g Pack  Active   SODIUM FLUORIDE 5000 PLUS 1.1 % Cream  Active   tizanidine (ZANAFLEX) 2 MG tablet  Active   vitamin D (CHOLECALCIFEROL) 1000 UNIT Tab  Active                    Allergies:  Allergies   Allergen Reactions    Wound Dressing Adhesive      Pt says band-aids cause skin reaction/rash if on for more than 2 days  Paper tape OK         Physical Exam:  Vital Signs: /54   Pulse 66   Temp 36.6 °C (97.8 °F) (Temporal)   Resp 18   Ht 1.6 m (5' 3\")   Wt 70.7 kg (155 lb 13.8 oz)   LMP  (LMP Unknown)   SpO2 97%   BMI 27.61 kg/m²   Constitutional: Alert, no acute distress  HENT: Normocephalic, mask in place  Eyes: Pupils equal and reactive, normal conjunctiva  Neck: Supple, normal range of motion, no stridor  Cardiovascular: Extremities are warm and well perfused, no murmur appreciated, normal cardiac auscultation  Pulmonary: No respiratory distress, normal work of breathing, no accessory muscule usage, breath sounds clear and equal bilaterally, no wheezing, no coarse breath sounds  Abdomen: Soft, non-distended, non-tender to palpation  Skin: Warm, dry, no rashes or lesions  Musculoskeletal: Left lower extremity is visibly more swollen than the right, she does have some tenderness to palpation of the posterior calf, distal extremities warm and well perfused, no pain out of proportion to exam, soft compartments throughout the left lower extremity, no " crepitus, and no overlying skin changes  Neurologic: Alert, oriented, normal speech, normal motor function  Psychiatric: Normal and appropriate mood and affect    Medical records reviewed for continuity of care.  Neurosurgical consult note authored by Dr. Bonner reviewed from 10 minutes ago.  She is postop day 4 from lumbar laminectomy/fusion.  She reports pain with a swollen left calf, she has a history of DVTs and did take 40 mg of Lovenox injection today.  This was started postop day 1.  If she has a DVT she is not cleared for full anticoagulation from neurosurgery standpoint.  If she is positive for DVT, neurosurgery recommends caval filter and 40 mg of Lovenox twice daily.  Plan is for duplex ultrasound.  If positive recommend medicine admission, caval filter, and increase Lovenox to 40 mg p.o. twice daily.  Full anticoagulation on postop day 10 if needed.    Labs:  Labs Reviewed - No data to display    Radiology:  US-EXTREMITY VENOUS LOWER UNILAT LEFT              ED Medications Administered:  Medications   NS (BOLUS) infusion 500 mL (500 mL Intravenous New Bag 9/25/22 2220)       Differential diagnosis:  DVT, peripheral edema, no symptoms concerning for pulmonary embolism    MDM:  Ms. Mathur presents to the emergency department today for evaluation of left lower extremity pain and swelling, she is postop day 4 following a spine surgery.  Concern is for development of a DVT, she does have a history of DVTs, though she has been fully adherent to her Lovenox regimen after discharge from the hospital.  She has been on Lovenox since postop day 1.  Plan for admission if DVT is present was discussed with Dr. Bonner via hospital messaging system.  At this time, she has no chest pain, no shortness of breath, no symptoms of pulmonary embolism.  Room air oxygen saturations 97%.    Left lower extremity ultrasound demonstrates chronic partially occlusive thrombus with flow recanalization in the left mid femoral vein on  preliminary read.    Pain and swelling are concerning for worsening DVT, despite the fact that no acute component was seen on the preliminary read.  Plan at this time is for admission to increase Lovenox, and for consideration for IVC filter in accordance with Dr. Bonner's recommendations.      Disposition:  Admit to hospitalist in stable condition    Final Impression:  1. 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

## 2022-09-26 NOTE — PROGRESS NOTES
Dr. Simons consented patient prior to procedure; all questions answered. Consent form signed and witnessed.     Timeout with Dr. Simons, Marquise, and Isaías RTs, Isaías and Teresa RNs.     Dr. Simons performed an Inferior Vena Cava Filter Placement with imagining guidance and moderate sedation; Patient tolerated procedure well. ETCO2 ineffective secondary to deep, regular respirations through patient's mouth, patient remained hemodynamically stable - see vital sign flowsheet.     Right groin accessed. Gauze and Tegaderm to right groin. Site clean, dry, intact, and soft. Patient transported to [facility] with RN and monitor. SPO2 = 98 on face mask @ 8 lpm. Report called to MARCO Lockwood.     Retrievable Vena Cava Filter  Argon Option Elite  REF: [id number]  LOT: [id number]  EXP: 08-    Hemostasis achieved at 1205.   Procedure end time 1205

## 2022-09-26 NOTE — PROGRESS NOTES
4 Eyes Skin Assessment Completed by MARCO Pineda and MARCO Ramos.    Head WDL  Ears WDL  Nose WDL  Mouth WDL  Neck WDL  Breast/Chest WDL  Shoulder Blades WDL  Spine Incision  (R) Arm/Elbow/Hand WDL  (L) Arm/Elbow/Hand WDL  Abdomen WDL  Groin WDL  Scrotum/Coccyx/Buttocks WDL  (R) Leg Swelling and Shiny  (L) Leg Scar, Swelling, and Shiny  (R) Heel/Foot/Toe WDL  (L) Heel/Foot/Toe WDL          Devices In Places Pulse Ox and MARIS's      Interventions In Place Pillows    Possible Skin Injury No    Pictures Uploaded Into Epic No, needs to be completed  Wound Consult Placed N/A  RN Wound Prevention Protocol Ordered No

## 2022-09-26 NOTE — ED NOTES
Bedside report given to MARCO Gilmore. Pt transferring to T731/01 via gurney on cardiac monitor on 2L O2 NC w/ T7 RN. Pt A&Ox4, belongings w/i possession. NAD noted.

## 2022-09-26 NOTE — PROGRESS NOTES
Discussed case with Dr. Bonner  Patient has left HF weakness on exam today as well.    Plan:  STAT MRI L-spine  Keep NPO  Continue with IR consult for cava filter placement

## 2022-09-26 NOTE — ED TRIAGE NOTES
"Chief Complaint   Patient presents with    Leg Swelling     L leg swelling.  States usually on xarelto, but stopped taking d/t back surgery.  Pt has been taking the lovenox injections as ordered post surgery.  Pt does have a hx of blood clots. +CMS to the E       Pt wheeled to triage. Pt A&Ox4, came in for above complaint.  States contacted her surgeon, and came here for concern of DVT.     Pt to lobby . Pt educated on alerting staff in changes to condition. Pt verbalized understanding.     BP (!) 95/54   Pulse 86   Temp 36.6 °C (97.8 °F) (Temporal)   Resp 18   Ht 1.6 m (5' 3\")   Wt 70.7 kg (155 lb 13.8 oz)   LMP  (LMP Unknown)   SpO2 95%   BMI 27.61 kg/m²     "

## 2022-09-26 NOTE — ASSESSMENT & PLAN NOTE
Continue wound care  Physical therapy Occupational Therapy pending  Continue gabapentin  following

## 2022-09-26 NOTE — PROGRESS NOTES
4 Eyes Skin Assessment Completed by MARCO Pineda and MARCO Ramos.    Head WDL  Ears WDL  Nose WDL  Mouth WDL  Neck WDL  Breast/Chest Bruising  Shoulder Blades WDL  Spine Incision  (R) Arm/Elbow/Hand WDL  (L) Arm/Elbow/Hand WDL  Abdomen WDL  Groin WDL  Scrotum/Coccyx/Buttocks WDL  (R) Leg Swelling  (L) Leg Swelling and Shiny  (R) Heel/Foot/Toe WDL  (L) Heel/Foot/Toe WDL          Devices In Places Blood Pressure Cuff, Pulse Ox, Nasal Cannula, Back Brace, and MARIS's      Interventions In Place Low Air Loss Mattress    Possible Skin Injury No    Pictures Uploaded Into Epic No, needs to be completed  Wound Consult Placed N/A  RN Wound Prevention Protocol Ordered No

## 2022-09-26 NOTE — ASSESSMENT & PLAN NOTE
"Admit patient to neurosurgery floor  Ultrasound shows chronic, partially occlusive thrombus  Neurosurgery on board, recommends Lovenox twice daily and IVC filter in a.m.  At this time not a candidate for therapeutic anticoagulation due to recent surgery  IR consulted 9/26 for IVC filter placement, and on 9/26 patient underwent \" Ultrasound and fluoroscopic guided placement of a Argon Option retrievable caval filter in the infrarenal IVC. The Argon Option retrievable filter is suitable for removal under usual circumstances to 180 days (6 months) and sometimes up to one year and longer. This filter may also be used as a permanent filter. Please consult Interventional Radiology for filter removal if indicated.\"     IVC filter in place  Enoxaparin  once daily 40 mg.  Full dose anticoagulation can be initiated after a few more days of healing from her neurosurgical intervention.  "

## 2022-09-26 NOTE — PROGRESS NOTES
Neurosurgery Progress Note    Subjective:  POD #5 L3-L5 lami/fusion  Discharged to home on . Then with worsening swelling on  in Left Lower Extremity.  ER found chronic DVT     Resting comfortably today  Denies SOB  Denies CP  Pain/swelling left calf  Voiding  On Lovenox BID  Aren hose in place      Exam:  Wound: C/D/I  RLE motor: 5/5 throughout  LLE motor: 3/5 weakness right HF, otherwise 5/5 in LLE  LLE swelling. 2+ edema  Pain to palpation of left calf  Sensation grossly intact in all dermatomes.    BP  Min: 82/47  Max: 146/75  Pulse  Av.8  Min: 66  Max: 86  Resp  Av  Min: 16  Max: 18  Temp  Av.7 °C (98 °F)  Min: 36.6 °C (97.8 °F)  Max: 36.8 °C (98.2 °F)  SpO2  Av.6 %  Min: 91 %  Max: 97 %    No data recorded                      Intake/Output                         22 0700 - 22 0659 22 07 - 22 0659     [id number] 1071-0294 Total 5167-7035 3764-9809 Total                 Intake    I.V.  --  500 500  --  -- --    Volume (mL) (NS (BOLUS) infusion 500 mL) -- 500 500 -- -- --    Total Intake -- 500 500 -- -- --       Output    Urine  --  -- --  500  -- 500    Number of Times Voided -- -- -- 4 x -- 4 x    Urine Void (mL) -- -- -- 500 -- 500    Total Output -- -- -- 500 -- 500       Net I/O     -- 500 500 -500 -- -500              Intake/Output Summary (Last 24 hours) at 2022 0813  Last data filed at 2022 0700  Gross per 24 hour   Intake 500 ml   Output 500 ml   Net 0 ml             enoxaparin (LOVENOX) injection  40 mg Q12HRS    atorvastatin  10 mg Q EVENING    gabapentin  600 mg QHS    oxyCODONE immediate-release  5 mg Q4HRS PRN    tizanidine  2 mg TID PRN       Assessment and Plan:  Hospital day #2  POD #5    L3-L5 lami/fusion on 2022  Readmit for Left Lower Extremity DVT    Plan:  She is not clear for full anticoagulation from Neurosurgery standpoint  Recommend IR consult for caval filter placement  Continue lovenox 40mg SQ BID  Continue to  mobilize as tolerated.

## 2022-09-26 NOTE — ED NOTES
Med rec completed per patient  Allergies reviewed    Patient currently on a 5 day course of Keflex 500 mg four times daily.    Patient was taking Xarelto 20 mg nightly, however had to stop for a back surgery. She ceased taking Xarelto on 09/14/2022 and began taking the Lovenox injections daily.

## 2022-09-27 PROBLEM — K59.00 CONSTIPATION: Status: ACTIVE | Noted: 2022-09-27

## 2022-09-27 PROBLEM — R07.9 CHEST PAIN: Status: ACTIVE | Noted: 2022-09-27

## 2022-09-27 LAB
ANION GAP SERPL CALC-SCNC: 6 MMOL/L (ref 7–16)
BUN SERPL-MCNC: 13 MG/DL (ref 8–22)
CALCIUM SERPL-MCNC: 7.8 MG/DL (ref 8.5–10.5)
CHLORIDE SERPL-SCNC: 102 MMOL/L (ref 96–112)
CO2 SERPL-SCNC: 24 MMOL/L (ref 20–33)
CREAT SERPL-MCNC: 0.35 MG/DL (ref 0.5–1.4)
EKG IMPRESSION: NORMAL
ERYTHROCYTE [DISTWIDTH] IN BLOOD BY AUTOMATED COUNT: 44.8 FL (ref 35.9–50)
GFR SERPLBLD CREATININE-BSD FMLA CKD-EPI: 99 ML/MIN/1.73 M 2
GLUCOSE SERPL-MCNC: 103 MG/DL (ref 65–99)
HCT VFR BLD AUTO: 29.6 % (ref 37–47)
HGB BLD-MCNC: 9.8 G/DL (ref 12–16)
MAGNESIUM SERPL-MCNC: 2 MG/DL (ref 1.5–2.5)
MCH RBC QN AUTO: 31.2 PG (ref 27–33)
MCHC RBC AUTO-ENTMCNC: 33.1 G/DL (ref 33.6–35)
MCV RBC AUTO: 94.3 FL (ref 81.4–97.8)
PHOSPHATE SERPL-MCNC: 3.4 MG/DL (ref 2.5–4.5)
PLATELET # BLD AUTO: 191 K/UL (ref 164–446)
PMV BLD AUTO: 10.2 FL (ref 9–12.9)
POTASSIUM SERPL-SCNC: 4.6 MMOL/L (ref 3.6–5.5)
RBC # BLD AUTO: 3.14 M/UL (ref 4.2–5.4)
SODIUM SERPL-SCNC: 132 MMOL/L (ref 135–145)
TROPONIN T SERPL-MCNC: 27 NG/L (ref 6–19)
TROPONIN T SERPL-MCNC: 28 NG/L (ref 6–19)
WBC # BLD AUTO: 6.1 K/UL (ref 4.8–10.8)

## 2022-09-27 PROCEDURE — 700111 HCHG RX REV CODE 636 W/ 250 OVERRIDE (IP): Performed by: HOSPITALIST

## 2022-09-27 PROCEDURE — 99233 SBSQ HOSP IP/OBS HIGH 50: CPT | Performed by: HOSPITALIST

## 2022-09-27 PROCEDURE — 85027 COMPLETE CBC AUTOMATED: CPT

## 2022-09-27 PROCEDURE — A9270 NON-COVERED ITEM OR SERVICE: HCPCS | Performed by: HOSPITALIST

## 2022-09-27 PROCEDURE — 97535 SELF CARE MNGMENT TRAINING: CPT

## 2022-09-27 PROCEDURE — 83735 ASSAY OF MAGNESIUM: CPT

## 2022-09-27 PROCEDURE — 80048 BASIC METABOLIC PNL TOTAL CA: CPT

## 2022-09-27 PROCEDURE — 700102 HCHG RX REV CODE 250 W/ 637 OVERRIDE(OP): Performed by: HOSPITALIST

## 2022-09-27 PROCEDURE — 99024 POSTOP FOLLOW-UP VISIT: CPT | Performed by: PHYSICIAN ASSISTANT

## 2022-09-27 PROCEDURE — A9270 NON-COVERED ITEM OR SERVICE: HCPCS | Performed by: STUDENT IN AN ORGANIZED HEALTH CARE EDUCATION/TRAINING PROGRAM

## 2022-09-27 PROCEDURE — 93005 ELECTROCARDIOGRAM TRACING: CPT | Performed by: HOSPITALIST

## 2022-09-27 PROCEDURE — 93010 ELECTROCARDIOGRAM REPORT: CPT | Performed by: INTERNAL MEDICINE

## 2022-09-27 PROCEDURE — 84100 ASSAY OF PHOSPHORUS: CPT

## 2022-09-27 PROCEDURE — 97161 PT EVAL LOW COMPLEX 20 MIN: CPT

## 2022-09-27 PROCEDURE — 770020 HCHG ROOM/CARE - TELE (206)

## 2022-09-27 PROCEDURE — 97165 OT EVAL LOW COMPLEX 30 MIN: CPT

## 2022-09-27 PROCEDURE — 700102 HCHG RX REV CODE 250 W/ 637 OVERRIDE(OP): Performed by: STUDENT IN AN ORGANIZED HEALTH CARE EDUCATION/TRAINING PROGRAM

## 2022-09-27 PROCEDURE — 84484 ASSAY OF TROPONIN QUANT: CPT

## 2022-09-27 RX ORDER — BISACODYL 10 MG
10 SUPPOSITORY, RECTAL RECTAL
Status: DISCONTINUED | OUTPATIENT
Start: 2022-09-27 | End: 2022-09-28 | Stop reason: HOSPADM

## 2022-09-27 RX ORDER — ENEMA 19; 7 G/133ML; G/133ML
1 ENEMA RECTAL ONCE
Status: DISCONTINUED | OUTPATIENT
Start: 2022-09-27 | End: 2022-09-28 | Stop reason: HOSPADM

## 2022-09-27 RX ADMIN — ATORVASTATIN CALCIUM 10 MG: 10 TABLET, FILM COATED ORAL at 17:03

## 2022-09-27 RX ADMIN — OXYCODONE 5 MG: 5 TABLET ORAL at 17:03

## 2022-09-27 RX ADMIN — OXYCODONE 5 MG: 5 TABLET ORAL at 10:04

## 2022-09-27 RX ADMIN — OXYCODONE 5 MG: 5 TABLET ORAL at 21:17

## 2022-09-27 RX ADMIN — ACETAMINOPHEN 650 MG: 325 TABLET, FILM COATED ORAL at 07:40

## 2022-09-27 RX ADMIN — GABAPENTIN 600 MG: 300 CAPSULE ORAL at 20:11

## 2022-09-27 RX ADMIN — DOCUSATE CALCIUM 240 MG: 240 CAPSULE, LIQUID FILLED ORAL at 05:02

## 2022-09-27 RX ADMIN — POLYETHYLENE GLYCOL 3350 1 PACKET: 17 POWDER, FOR SOLUTION ORAL at 05:02

## 2022-09-27 RX ADMIN — ENOXAPARIN SODIUM 40 MG: 40 INJECTION SUBCUTANEOUS at 05:02

## 2022-09-27 ASSESSMENT — COGNITIVE AND FUNCTIONAL STATUS - GENERAL
DAILY ACTIVITIY SCORE: 22
SUGGESTED CMS G CODE MODIFIER MOBILITY: CH
SUGGESTED CMS G CODE MODIFIER DAILY ACTIVITY: CJ
DRESSING REGULAR LOWER BODY CLOTHING: A LITTLE
HELP NEEDED FOR BATHING: A LITTLE
MOBILITY SCORE: 24

## 2022-09-27 ASSESSMENT — PAIN DESCRIPTION - PAIN TYPE
TYPE: ACUTE PAIN

## 2022-09-27 ASSESSMENT — ENCOUNTER SYMPTOMS
BRUISES/BLEEDS EASILY: 0
FEVER: 0
BLURRED VISION: 0
NAUSEA: 0
SORE THROAT: 0
HEADACHES: 0
CHILLS: 0
FOCAL WEAKNESS: 0
WEIGHT LOSS: 0
PALPITATIONS: 0
WEAKNESS: 1
MYALGIAS: 0
ABDOMINAL PAIN: 1
EYES NEGATIVE: 1
PSYCHIATRIC NEGATIVE: 1
SHORTNESS OF BREATH: 0
CARDIOVASCULAR NEGATIVE: 1
DIAPHORESIS: 0
DIZZINESS: 0
RESPIRATORY NEGATIVE: 1
VOMITING: 0
BACK PAIN: 1
COUGH: 0
DEPRESSION: 0

## 2022-09-27 ASSESSMENT — GAIT ASSESSMENTS
ASSISTIVE DEVICE: FRONT WHEEL WALKER
DISTANCE (FEET): 350
GAIT LEVEL OF ASSIST: SUPERVISED

## 2022-09-27 ASSESSMENT — ACTIVITIES OF DAILY LIVING (ADL): TOILETING: INDEPENDENT

## 2022-09-27 ASSESSMENT — LIFESTYLE VARIABLES: SUBSTANCE_ABUSE: 0

## 2022-09-27 ASSESSMENT — FIBROSIS 4 INDEX: FIB4 SCORE: 1.67

## 2022-09-27 NOTE — PROGRESS NOTES
"Patient c/o constipation. Patient states suppository has not been successful for inducing BM in past. Patient given prune juice and states \"usually I eat a few pieces of fruit and it gets me going\". Will continue to monitor.  "

## 2022-09-27 NOTE — THERAPY
"Physical Therapy   Initial Evaluation     Patient Name: Marry Mathur  Age:  87 y.o., Sex:  female  Medical Record #: [id number]  Today's Date: 9/27/2022     Precautions  Precautions: Lumbosacral Orthosis  Comments: s/p IVC filter; recent L3-5 lami and fusion, LSO when OOB    Assessment  Patient is 87 y.o. female that presented to acute with complaints of LLE edema. She underwent recent L3-5 lami and fusion. Medical dx of DVT and patient is now s/p IVC filter. She presented to PT near baseline functional mobility and performed transfers and community distance ambulation with FWW and supervision. She reported no concerns regarding mobility or returning home following medical DC. She would benefit from HH PT to progress independence and outpatient PT for improved outcome following surgery once able to access outpatient clinic. Patient will not be actively followed for physical therapy services at this time, however may be seen if requested by physician for 1 more visit within 30 days to address any discharge or equipment needs.    Plan    Recommend Physical Therapy for Evaluation only    DC Equipment Recommendations: None  Discharge Recommendations: Recommend home health for continued physical therapy services       Subjective    \"My step son can be there at the drop of the hat.\"     Objective       09/27/22 1131   Charge Group   PT Evaluation PT Evaluation Low  (19 min)   Initial Contact Note    Initial Contact Note Order Received and Verified, Evaluation Only - Patient Does Not Require Further Acute Physical Therapy at this Time.  However, May Benefit from Post Acute Therapy for Higher Level Functional Deficits.   Precautions   Precautions Lumbosacral Orthosis   Comments s/p IVC filter; recent L3-5 lami and fusion, LSO when OOB   Vitals   O2 (LPM) 0   O2 Delivery Device None - Room Air   Pain 0 - 10 Group   Therapist Pain Assessment   (no pain complaint during session)   Prior Living Situation   Prior " Services None   Housing / Facility 1 Story House   Steps Into Home   (ramp)   Equipment Owned Front-Wheel Walker   Lives with - Patient's Self Care Capacity Alone and Able to Care For Self   Comments Patient reported step son in town available as needed and  assists with IADLs   Prior Level of Functional Mobility   Bed Mobility Independent   Transfer Status Independent   Ambulation Independent   Distance Ambulation (Feet)   (community)   Assistive Devices Used None   Cognition    Cognition / Consciousness WDL   Level of Consciousness Alert   Comments very pleasant, cooperative   Passive ROM Lower Body   Comments functional for mobility   Active ROM Lower Body    Comments as above   Strength Lower Body   Comments as above   Sensation Lower Body   Lower Extremity Sensation   Not Tested   Lower Body Muscle Tone   Lower Body Muscle Tone  WDL   Balance Assessment   Sitting Balance (Static) Good   Sitting Balance (Dynamic) Good   Standing Balance (Static) Fair +   Standing Balance (Dynamic) Fair   Weight Shift Sitting Good   Weight Shift Standing Good   Comments with FWW, no LOB   Gait Analysis   Gait Level Of Assist Supervised   Assistive Device Front Wheel Walker   Distance (Feet) 350   # of Times Distance was Traveled 1   Deviation   (forward flexed posture, reported this is chronic)   # of Stairs Climbed 0   Weight Bearing Status no restrictions   Vision Deficits Impacting Mobility NT   Bed Mobility    Supine to Sit   (NT, in recliner pre and post, appears capable)   Scooting Supervised  (seated)   Functional Mobility   Sit to Stand Supervised   Bed, Chair, Wheelchair Transfer Supervised   Transfer Method Stand Step   How much difficulty does the patient currently have...   Turning over in bed (including adjusting bedclothes, sheets and blankets)? 4   Sitting down on and standing up from a chair with arms (e.g., wheelchair, bedside commode, etc.) 4   Moving from lying on back to sitting on the side of the  bed? 4   How much help from another person does the patient currently need...   Moving to and from a bed to a chair (including a wheelchair)? 4   Need to walk in a hospital room? 4   Climbing 3-5 steps with a railing? 4   6 clicks Mobility Score 24   Activity Tolerance   Sitting in Chair in recliner pre and post   Sitting Edge of Bed NT   Standing 10-12 min   Comments no overt pain, fatigue, SOB, dizziness   Edema / Skin Assessment   Comments LLE edema appears improved   Education Group   Education Provided Role of Physical Therapist   Role of Physical Therapist Patient Response Patient;Acceptance;Explanation;Verbal Demonstration   Anticipated Discharge Equipment and Recommendations   DC Equipment Recommendations None   Discharge Recommendations Recommend home health for continued physical therapy services   Interdisciplinary Plan of Care Collaboration   IDT Collaboration with  Nursing;Occupational Therapist   Patient Position at End of Therapy Seated;Call Light within Reach;Tray Table within Reach;Phone within Reach   Collaboration Comments RN aware of visit, response   Session Information   Date / Session Number  9/27 - 1x only

## 2022-09-27 NOTE — CARE PLAN
The patient is Stable - Low risk of patient condition declining or worsening    Shift Goals  Clinical Goals: Pain management  Patient Goals: Comfort  Family Goals: N/A    Progress made toward(s) clinical / shift goals:    Problem: Pain - Standard  Goal: Alleviation of pain or a reduction in pain to the patient’s comfort goal  Outcome: Progressing. Reports pain well controlled with prn oxycodone.     Problem: Knowledge Deficit - Standard  Goal: Patient and family/care givers will demonstrate understanding of plan of care, disease process/condition, diagnostic tests and medications  Outcome: Progressing     Problem: Skin Integrity  Goal: Skin integrity is maintained or improved  Outcome: Progressing. Turns self. Right groin site remains CDI with no drainage or swelling.       Patient is not progressing towards the following goals:

## 2022-09-27 NOTE — WOUND TEAM
Discussed with primary RN about surgical incision.  Advance wound care is not needed at this time.  Wound team is signing off.

## 2022-09-27 NOTE — PROGRESS NOTES
Report received at bedside, pt care assumed. Pt aaox4, no signs of distress noted at this time. Patient resting comfortably in bed. POC discussed with pt and verbalizes no questions. Pt denies any additional needs at this time. Bed in lowest position, pt educated on fall risk and verbalized understanding, call light within reach, bed alarm plugged in and on.

## 2022-09-27 NOTE — PROGRESS NOTES
"[facility] Medicine Daily Progress Note    Date of Service  9/27/2022    Chief Complaint  Leg pain and swelling    Hospital Course  Marry Mathur is a 87 y.o. female with a recent L3-L4 and L5-L5 decompressive laminectomy and bilateral foraminotomies.  She was admitted to [facility] on 9/25/2022 with acute leg swelling that began post operatively, after she had stopped her chronic anticoagulation.  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     Interval Problem Update  Axox3, she reports stable back/ surgical site pain 4/10 and stable mild r LE weakness. She reports she has \"felt constipated\" for over a week, has had only very small, hard bowel movements since her recent surgery. She reports some mild \"pain/discomfort\", under her left rib \" I think it may be related to my constipation\", it is constant and \"aching\" 3/10. No ekg changes. Hyperactive bowel sounds, mild tenderness to palpation. No sob, no dizziness, no n/v. NO cough. ROS otherwise negative.   MRI done last night - demonstrates a suspect post op seroma at L3-L5, no other acute changes    Consultants/Specialty  neurosurgery  IR    Code Status  Full Code    Disposition  Patient is not medically cleared for discharge.   Anticipate discharge to to home with close outpatient follow-up.  I have placed the appropriate orders for post-discharge needs.    Review of Systems   Constitutional:  Positive for malaise/fatigue. Negative for chills, diaphoresis, fever and weight loss.   HENT: Negative.  Negative for sore throat.    Eyes: Negative.  Negative for blurred vision.   Respiratory: Negative.  Negative for cough and shortness of breath.    Cardiovascular: Negative.  Negative for chest pain, palpitations and leg swelling.   Gastrointestinal:  Positive for abdominal pain (mild RUQ). Negative for nausea and vomiting.   Genitourinary: Negative.  Negative for dysuria.   Musculoskeletal:  Positive for " back pain. Negative for myalgias.   Skin: Negative.  Negative for itching and rash.   Neurological:  Positive for weakness. Negative for dizziness, focal weakness and headaches.   Endo/Heme/Allergies: Negative.  Does not bruise/bleed easily.   Psychiatric/Behavioral: Negative.  Negative for depression, substance abuse and suicidal ideas.    All other systems reviewed and are negative.    Physical Exam  Constitutional:       Appearance: Normal appearance.   HENT:      Head: Normocephalic and atraumatic.      Right Ear: Tympanic membrane normal.      Left Ear: Tympanic membrane normal.      Nose: Nose normal. No congestion.      Mouth/Throat:      Mouth: Mucous membranes are moist.      Pharynx: Oropharynx is clear.   Eyes:      General: No scleral icterus.        Right eye: No discharge.         Left eye: No discharge.      Extraocular Movements: Extraocular movements intact.      Conjunctiva/sclera: Conjunctivae normal.      Pupils: Pupils are equal, round, and reactive to light.   Cardiovascular:      Rate and Rhythm: Normal rate and regular rhythm.      Pulses: Normal pulses.      Heart sounds: Normal heart sounds. No murmur heard.    No friction rub. No gallop.   Pulmonary:      Effort: Pulmonary effort is normal. No respiratory distress.      Breath sounds: Normal breath sounds. No wheezing, rhonchi or rales.   Abdominal:      General: Abdomen is flat. There is no distension.      Palpations: Abdomen is soft. There is no mass.      Tenderness: There is no right CVA tenderness, left CVA tenderness, guarding or rebound.      Hernia: No hernia is present.      Comments: Hyperactive, mild L UQ pain to palpation   Musculoskeletal:         General: No swelling, deformity or signs of injury. Normal range of motion.      Cervical back: Normal range of motion and neck supple. No rigidity or tenderness.      Right lower leg: No edema.      Left lower leg: No edema (mild).      Comments: Lumbar surgical site cdi    Lymphadenopathy:      Cervical: No cervical adenopathy.   Skin:     General: Skin is warm.      Findings: No bruising, erythema or lesion.   Neurological:      General: No focal deficit present.      Mental Status: She is alert. Mental status is at baseline.      Cranial Nerves: No cranial nerve deficit.      Sensory: No sensory deficit.      Comments: Minimal LE weakness L > R   Psychiatric:         Mood and Affect: Mood normal.         Behavior: Behavior normal.         Thought Content: Thought content normal.         Judgment: Judgment normal.             Current Facility-Administered Medications:     bisacodyl (DULCOLAX) suppository 10 mg, 10 mg, Rectal, QDAY PRN, Myesha Bryson M.D.    acetaminophen (Tylenol) tablet 650 mg, 650 mg, Oral, Q4HRS PRN, Dash Cordon M.D., 650 mg at 09/27/22 0740    docusate calcium (SURFAK) capsule 240 mg, 240 mg, Oral, DAILY, Dash Cordon M.D., 240 mg at 09/27/22 0502    polyethylene glycol/lytes (MIRALAX) PACKET 1 Packet, 1 Packet, Oral, DAILY, Dash Cordon M.D., 1 Packet at 09/27/22 0502    sennosides (SENOKOT) 8.6 MG tablet 17.2 mg, 17.2 mg, Oral, QHS, Dash Cordon M.D.    enoxaparin (Lovenox) inj 40 mg, 40 mg, Subcutaneous, DAILY, Dash Cordon M.D., 40 mg at 09/27/22 0502    atorvastatin (LIPITOR) tablet 10 mg, 10 mg, Oral, Q EVENING, Selwyn Waters M.D., 10 mg at 09/26/22 1813    gabapentin (NEURONTIN) capsule 600 mg, 600 mg, Oral, QHS, Selwyn Waters M.D., 600 mg at 09/26/22 2037    oxyCODONE immediate-release (ROXICODONE) tablet 5 mg, 5 mg, Oral, Q4HRS PRN, Selwyn Waters M.D., 5 mg at 09/27/22 1004    tizanidine (ZANAFLEX) tablet 2 mg, 2 mg, Oral, TID PRN, Selwyn Waters M.D., 2 mg at 09/26/22 1423      Fluids    Intake/Output Summary (Last 24 hours) at 9/27/2022 1328  Last data filed at 9/27/2022 0500  Gross per 24 hour   Intake 640 ml   Output 900 ml   Net -260 ml       Laboratory  Recent Labs     09/27/22  0317   WBC 6.1   RBC 3.14*    HEMOGLOBIN 9.8*   HEMATOCRIT 29.6*   MCV 94.3   MCH 31.2   MCHC 33.1*   RDW 44.8   PLATELETCT 191   MPV 10.2     Recent Labs     09/27/22  0317   SODIUM 132*   POTASSIUM 4.6   CHLORIDE 102   CO2 24   GLUCOSE 103*   BUN 13   CREATININE 0.35*   CALCIUM 7.8*                   Imaging  MR-LUMBAR SPINE-W/O   Final Result      1.  Previous posterior lumbar fusion from L3 through L5 bilaterally. Previous placement disc lesion device for at the L4-5 level. Previous laminectomy at the L4-5 level.      2.  Small elliptical dorsal epidural postoperative seroma extending from the L3-4 level to the L5 level effacing the posterior aspect of the thecal sac.      3.  Mild degenerative anterolisthesis at the L3-4 level.      4.  Minimal lumbar spondylotic change and mild broad-based disc bulging effacing the ventral surface of thecal sac at the L3-4 level. Additionally there is mild central canal stenosis at this level and the L4-5 level secondary to facet arthropathy.      5.  Mild central disc protrusion at the L1-2 level effacing the ventral surface of thecal sac.      6.  Mild central canal stenosis at the L2-3 level secondary to facet arthropathy. Further there is minimal lumbar spondylotic changes at this level.      7.  Mild multilevel lumbar spondylotic changes throughout the lumbar region.      IR-INSERT IVC FILTER WITH IG & SI   Final Result      1. Ultrasound and fluoroscopic guided placement of a Argon Option retrievable caval filter in the infrarenal IVC.   The Argon Option retrievable filter is suitable for removal under usual circumstances to 180 days (6 months) and sometimes up to one year and longer. This filter may also be used as a permanent filter. Please consult Interventional Radiology for filter    removal if indicated.      2. Inferior vena cavagram within normal limits with no evidence of caval thrombus or occlusion.      US-EXTREMITY VENOUS LOWER UNILAT LEFT   Final Result           Assessment/Plan  *  "Thrombus  Assessment & Plan  Admit patient to neurosurgery floor  Ultrasound shows chronic, partially occlusive thrombus  Neurosurgery on board, recommends Lovenox twice daily and IVC filter in a.m.  At this time not a candidate for therapeutic anticoagulation due to recent surgery  IR consulted 9/26 for IVC filter placement, and on 9/26 patient underwent \" Ultrasound and fluoroscopic guided placement of a Argon Option retrievable caval filter in the infrarenal IVC. The Argon Option retrievable filter is suitable for removal under usual circumstances to 180 days (6 months) and sometimes up to one year and longer. This filter may also be used as a permanent filter. Please consult Interventional Radiology for filter removal if indicated.\"     IVC filter in place  Enoxaparin  once daily 40 mg.  Full dose anticoagulation can be initiated after a few more days of healing from her neurosurgical intervention.    Chest pain  Assessment & Plan  The ASCVD Risk score ([place] SANTO Jr., et al., 2013) failed to calculate for the following reasons:    The 2013 ASCVD risk score is only valid for ages 40 to 79  Atypical  No acute ekg changes  Following  Will check trops  Suspect constipation is contributing ( addressing )    Constipation  Assessment & Plan  Will increase bowel regimen  Suspect contributing to mild atypical chest/ abdominal pain    Spondylolisthesis, lumbar region- (present on admission)  Assessment & Plan  Continue wound care  Physical therapy Occupational Therapy pending  Continue gabapentin  following    Advanced care planning/counseling discussion  Assessment & Plan  Goals of cares discussed with patient and friend at bedside.  Full code.    Hyperlipidemia- (present on admission)  Assessment & Plan  Continue lipitor                "

## 2022-09-27 NOTE — PROGRESS NOTES
"Neurosurgery  POD# 6  IVC filter placed yesterday, left aniya pain improved,   Low back pain persists  Some left sided anterior chest pain this morning - ECG in the works  Denies n/v/ha/dizziness  Patient up to chair with RN/CNA.   Bilateral hip weakness improved with sitting in a chair  MRI lumbar spine from 9/26 shows normal postoperative changes without complication      Objective:  BP (!) 149/85   Pulse 83   Temp 36.6 °C (97.9 °F) (Temporal)   Resp 16   Ht 1.6 m (5' 2.99\")   Wt 74.7 kg (164 lb 10.9 oz)   SpO2 95%     Intake/Output Summary (Last 24 hours) at 9/27/2022 0754  Last data filed at 9/27/2022 0500  Gross per 24 hour   Intake 640 ml   Output 900 ml   Net -260 ml       Recent Labs     09/27/22  0317   WBC 6.1   RBC 3.14*   HEMOGLOBIN 9.8*   HEMATOCRIT 29.6*   MCV 94.3   MCH 31.2   MCHC 33.1*   RDW 44.8   PLATELETCT 191   MPV 10.2     Recent Labs     09/27/22  0317   SODIUM 132*   POTASSIUM 4.6   CHLORIDE 102   CO2 24   GLUCOSE 103*   BUN 13         Mildy hypertensive, mildly tachycardic  Hyponatremic at 132, Hgb 9.8, trending upwards  Surgical incision clean, dry, intact, no evidence of infection  Strength:  Lower extremities are 5/5 grossly, trace bilateral hip weakness  Left lower extremity continues with swelling  Otherwise neurologically intact    Assessment:  Active Hospital Problems    Diagnosis     Thrombus [I82.90]     Spondylolisthesis, lumbar region [M43.16]      Added automatically from request for surgery [id number]      Advanced care planning/counseling discussion [Z71.89]      IMO load March 2020      Hyperlipidemia [E78.5]      POD#6 S/p L3-5 laminectomy and fusion  Chemoprophylaxis: On Lovenox 40mg qd, fully anticoagulate TBD    Plan:  1. Ambulate with PT/OT as tolerated - LSO when OOB and active  2. Wound open to air, cover for comfort only if patient requests  3. Continue Lovenox, IV filter, add incentive spirometer  4. Chest pain workup per medicine  5. Disposition per medicine   "

## 2022-09-27 NOTE — ASSESSMENT & PLAN NOTE
The ASCVD Risk score ([place] BLANCHARD Jr., et al., 2013) failed to calculate for the following reasons:    The 2013 ASCVD risk score is only valid for ages 40 to 79  Atypical  No acute ekg changes  Following  Will check trops  Suspect constipation is contributing ( addressing )

## 2022-09-27 NOTE — PROGRESS NOTES
Report received from [facility] shift RN, assumed patient care. Patient is calmly resting in bed, no signs of distress, even and unlabored breathing noted. Pt on 2 L NC O2. Pt medical. Patient has call light within reach.

## 2022-09-27 NOTE — PROGRESS NOTES
Bedside report received and patient care assumed. Pt is resting in bed, A&Ox4, with no complaints of pain, and is on 2L NC. All fall precautions are in place, belongings at bedside table.  Pt was updated on POC, no questions or concerns. Pt educated on use of call light for assistance.

## 2022-09-28 ENCOUNTER — TELEPHONE (OUTPATIENT)
Dept: NEUROSURGERY | Facility: MEDICAL CENTER | Age: 87
End: 2022-09-28
Payer: MEDICARE

## 2022-09-28 ENCOUNTER — PHARMACY VISIT (OUTPATIENT)
Dept: PHARMACY | Facility: MEDICAL CENTER | Age: 87
End: 2022-09-28
Payer: COMMERCIAL

## 2022-09-28 VITALS
RESPIRATION RATE: 16 BRPM | SYSTOLIC BLOOD PRESSURE: 119 MMHG | HEIGHT: 63 IN | BODY MASS INDEX: 30 KG/M2 | OXYGEN SATURATION: 92 % | WEIGHT: 169.31 LBS | DIASTOLIC BLOOD PRESSURE: 75 MMHG | HEART RATE: 88 BPM | TEMPERATURE: 98.2 F

## 2022-09-28 PROBLEM — R07.9 CHEST PAIN: Status: RESOLVED | Noted: 2022-09-27 | Resolved: 2022-09-28

## 2022-09-28 PROBLEM — K59.00 CONSTIPATION: Status: RESOLVED | Noted: 2022-09-27 | Resolved: 2022-09-28

## 2022-09-28 PROCEDURE — 700102 HCHG RX REV CODE 250 W/ 637 OVERRIDE(OP): Performed by: HOSPITALIST

## 2022-09-28 PROCEDURE — 700111 HCHG RX REV CODE 636 W/ 250 OVERRIDE (IP): Performed by: HOSPITALIST

## 2022-09-28 PROCEDURE — 700102 HCHG RX REV CODE 250 W/ 637 OVERRIDE(OP): Performed by: STUDENT IN AN ORGANIZED HEALTH CARE EDUCATION/TRAINING PROGRAM

## 2022-09-28 PROCEDURE — 99239 HOSP IP/OBS DSCHRG MGMT >30: CPT | Performed by: HOSPITALIST

## 2022-09-28 PROCEDURE — A9270 NON-COVERED ITEM OR SERVICE: HCPCS | Performed by: STUDENT IN AN ORGANIZED HEALTH CARE EDUCATION/TRAINING PROGRAM

## 2022-09-28 PROCEDURE — 99024 POSTOP FOLLOW-UP VISIT: CPT | Performed by: PHYSICIAN ASSISTANT

## 2022-09-28 PROCEDURE — RXMED WILLOW AMBULATORY MEDICATION CHARGE: Performed by: HOSPITALIST

## 2022-09-28 PROCEDURE — A9270 NON-COVERED ITEM OR SERVICE: HCPCS | Performed by: HOSPITALIST

## 2022-09-28 RX ORDER — BISACODYL 10 MG
10 SUPPOSITORY, RECTAL RECTAL
Qty: 10 SUPPOSITORY | Refills: 0 | Status: SHIPPED | OUTPATIENT
Start: 2022-09-28 | End: 2022-10-03

## 2022-09-28 RX ORDER — DOCUSATE SODIUM 100 MG/1
240 CAPSULE, LIQUID FILLED ORAL DAILY
Qty: 60 CAPSULE | Refills: 0 | Status: SHIPPED | OUTPATIENT
Start: 2022-09-29 | End: 2022-10-03

## 2022-09-28 RX ADMIN — POLYETHYLENE GLYCOL 3350 1 PACKET: 17 POWDER, FOR SOLUTION ORAL at 05:33

## 2022-09-28 RX ADMIN — ENOXAPARIN SODIUM 40 MG: 40 INJECTION SUBCUTANEOUS at 05:33

## 2022-09-28 RX ADMIN — OXYCODONE 5 MG: 5 TABLET ORAL at 10:04

## 2022-09-28 RX ADMIN — ACETAMINOPHEN 650 MG: 325 TABLET, FILM COATED ORAL at 07:12

## 2022-09-28 RX ADMIN — DOCUSATE CALCIUM 240 MG: 240 CAPSULE, LIQUID FILLED ORAL at 05:33

## 2022-09-28 ASSESSMENT — PAIN DESCRIPTION - PAIN TYPE: TYPE: ACUTE PAIN

## 2022-09-28 NOTE — CARE PLAN
The patient is Stable - Low risk of patient condition declining or worsening    Shift Goals  Clinical Goals: pain management, mobilization, BM, discharge planning  Patient Goals: ambulation, comfort, discharge  Family Goals: n/a    Progress made toward(s) clinical / shift goals:    Problem: Pain - Standard  Goal: Alleviation of pain or a reduction in pain to the patient’s comfort goal  Outcome: Progressing     Problem: Knowledge Deficit - Standard  Goal: Patient and family/care givers will demonstrate understanding of plan of care, disease process/condition, diagnostic tests and medications  Outcome: Progressing     Problem: Skin Integrity  Goal: Skin integrity is maintained or improved  Outcome: Progressing       Patient is not progressing towards the following goals:

## 2022-09-28 NOTE — DISCHARGE PLANNING
RNCM spoke to pt bedside to inquire HH choice. Pt choiced for 1)[facility] 2)[facility] 3)[facility]  Scanned form to DPA

## 2022-09-28 NOTE — PROGRESS NOTES
"Neurosurgery  POD# 7  IVC filter placed 2 days ago, left aniya pain improved,   Low back pain persists  Left leg strength improved today  -some new right thigh weakness  She has been mobilizing   She has been voiding  Denies n/v/ha/dizziness  MRI lumbar spine from 9/26 shows normal postoperative changes without complication      Objective:  /75   Pulse 88   Temp 36.8 °C (98.2 °F) (Temporal)   Resp 16   Ht 1.6 m (5' 2.99\")   Wt 76.8 kg (169 lb 5 oz)   SpO2 92%     Intake/Output Summary (Last 24 hours) at 9/27/2022 0754  Last data filed at 9/27/2022 0500  Gross per 24 hour   Intake 640 ml   Output 900 ml   Net -260 ml       Recent Labs     09/27/22  0317   WBC 6.1   RBC 3.14*   HEMOGLOBIN 9.8*   HEMATOCRIT 29.6*   MCV 94.3   MCH 31.2   MCHC 33.1*   RDW 44.8   PLATELETCT 191   MPV 10.2     Recent Labs     09/27/22  0317   SODIUM 132*   POTASSIUM 4.6   CHLORIDE 102   CO2 24   GLUCOSE 103*   BUN 13         VSS  Surgical incision clean, dry, intact, no evidence of infection  Strength:  Lower extremities are 5/5 grossly, trace Right HF weakness today, Left HF 5/5  Left lower extremity continues with swelling  Otherwise neurologically intact    Assessment:  Active Hospital Problems    Diagnosis     Constipation [K59.00]     Chest pain [R07.9]     Thrombus [I82.90]     Spondylolisthesis, lumbar region [M43.16]      Added automatically from request for surgery [id number]      Advanced care planning/counseling discussion [Z71.89]      IMO load March 2020      Hyperlipidemia [E78.5]      POD#7 S/p L3-5 laminectomy and fusion  Chemoprophylaxis: On Lovenox 40mg qd, fully anticoagulate 10/1/2022    Plan:  1. Ambulate with PT/OT as tolerated - LSO when OOB and active  2. Wound open to air, cover for comfort only if patient requests  3. Continue Lovenox, IV filter, add incentive spirometer  4. Ok to D/C from NSX standpoint.  Ok for full anticoagualtion 10/1/2022  5. Disposition per medicine   "

## 2022-09-28 NOTE — FACE TO FACE
Face to Face Supporting Documentation - Home Health    The encounter with this patient was in whole or in part the primary reason for home health admission.    Date of encounter:   Patient:                    MRN:                       YOB: 2021  Marry Mathur  [id number]  1935     Home health to see patient for:  Skilled Nursing care for assessment, interventions & education    Skilled need for:  Surgical Aftercare pt and ot    Skilled nursing interventions to include:  Wound Care    Homebound status evidenced by:  Needs the assistance of another person in order to leave the home. Leaving home requires a considerable and taxing effort. There is a normal inability to leave the home.    Community Physician to provide follow up care: CRISTINO Horner     Optional Interventions? No      I certify the face to face encounter for this home health care referral meets the CMS requirements and the encounter/clinical assessment with the patient was, in whole, or in part, for the medical condition(s) listed above, which is the primary reason for home health care. Based on my clinical findings: the service(s) are medically necessary, support the need for home health care, and the homebound criteria are met.  I certify that this patient has had a face to face encounter by myself.  Myesha Bryson M.D. - NPI: [id number]

## 2022-09-28 NOTE — CARE PLAN
The patient is Stable - Low risk of patient condition declining or worsening    Shift Goals  Clinical Goals: pain management, mobilization  Patient Goals: comfort and ambulation  Family Goals: n/a    Progress made toward(s) clinical / shift goals:    Problem: Pain - Standard  Goal: Alleviation of pain or a reduction in pain to the patient’s comfort goal  Outcome: Progressing     Problem: Knowledge Deficit - Standard  Goal: Patient and family/care givers will demonstrate understanding of plan of care, disease process/condition, diagnostic tests and medications  Outcome: Progressing     Problem: Skin Integrity  Goal: Skin integrity is maintained or improved  Outcome: Progressing       Patient is not progressing towards the following goals:

## 2022-09-28 NOTE — PROGRESS NOTES
Patient aware of discharge instructions, medication details, and follow up appointments. Patient awaiting wheelchair for discharge via car with friend for discharge home with HH.

## 2022-09-28 NOTE — PROGRESS NOTES
Bedside report received and patient care assumed. Pt is resting in bed, A&Ox4, 3/10 back pain medicated per MAR. Tele box on. All fall precautions are in place, belongings at bedside table.  Pt was updated on POC, no questions or concerns. Pt educated on use of call light for assistance.

## 2022-09-28 NOTE — THERAPY
"Occupational Therapy   Initial Evaluation     Patient Name: Marry Mathur  Age:  87 y.o., Sex:  female  Medical Record #: [id number]  Today's Date: 9/27/2022       Precautions: Lumbosacral Orthosis  Comments: s/p IVC filter; recent L3-5 lami and fusion, LSO when OOB    Assessment  Patient is 87 y.o. female with a recent L3-L4 and L5-L5 decompressive laminectomy and bilateral foraminotomies.  She was admitted to [facility] on 9/25/2022 with acute leg swelling that began post operatively, after she had stopped her chronic anticoagulation.  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  Pt was educated on spinal precautions during ADL's & reviewed written handout.  Pt reports she has friends that are able to help her if needed upon D/C, also has a step son that is helpful & her  has also offered to help if needed.  Pt has all necessary AE for home use.  Pt may have difficulty donning cuba hose independently. Pt was able to don LSO with supervision.  Pt would benefit from HH therapy upon D/C home.      Plan    Recommend Occupational Therapy for Evaluation only.    DC Equipment Recommendations: None (pt currently has all necessary AE for home use)  Discharge Recommendations: Recommend home health for continued occupational therapy services     Subjective    \"My leg is feeling a lot better & it's not as swollen\"     Objective       09/27/22 1118   Prior Living Situation   Prior Services Intermittent Physical Support for ADL Per Family   Housing / Facility 1 Story House   Steps Into Home   (ramp)   Bathroom Set up Walk In Shower;Shower Chair   Equipment Owned Front-Wheel Walker;Tub / Shower Seat;Grab Bar(s) By Toilet;Bed Side Commode;Hand Held Shower;Reacher;Ramp   Lives with - Patient's Self Care Capacity Alone and Able to Care For Self   Comments pt reports she has a step son that can help her as well as a  & currently has some " friends in town that have been assisting as needed   Cognition    Comments pleasant, co-operative & receptive to new learning   Balance Assessment   Sitting Balance (Static) Good   Sitting Balance (Dynamic) Good   Standing Balance (Static) Fair +   Standing Balance (Dynamic) Fair   Weight Shift Sitting Good   Weight Shift Standing Good   Bed Mobility    Comments up in chair pre & post tx   ADL Assessment   Eating Modified Independent   Grooming Supervision;Standing   Upper Body Dressing Supervision   Lower Body Dressing Minimal Assist  (pt will need assistance to don cuba hose)   Toileting Supervision   Comments pt reports she has LH reaches at home she uses   Functional Mobility   Sit to Stand Supervised   Bed, Chair, Wheelchair Transfer Supervised   Toilet Transfers Supervised   Mobility pt able to amb with FWW & supervision   Session Information   Date / Session Number  9/27- eval only

## 2022-09-28 NOTE — DISCHARGE SUMMARY
Discharge Summary    CHIEF COMPLAINT ON ADMISSION  Chief Complaint   Patient presents with    Leg Swelling     L leg swelling.  States usually on xarelto, but stopped taking d/t back surgery.  Pt has been taking the lovenox injections as ordered post surgery.  Pt does have a hx of blood clots. +CMS to the [facility]       Reason for Admission  L Leg Swelling     Admission Date  9/25/2022    CODE STATUS  Full Code    HPI & HOSPITAL COURSE  Marry Mathur is a 87 y.o. female with a recent L3-L4 and L5-L5 decompressive laminectomy and bilateral foraminotomies.  She was admitted to [facility] on 9/25/2022 with acute leg swelling that began post operatively, after she had stopped her chronic anticoagulation.  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   She was also treated for constipation, which has now resolved. She was cleared to continue 40 mg of lovenox daily by neurosurgery and she will be discharged on this with a plan to stop it and resume her previous home xarelto in one week.  She did well with physical therapy and will discharge home with home health/ PT and OT.    She agrees to close follow up with neurosurgery and to return to the er if needed.     Therefore, she is discharged in fair and stable condition to home with organized home healthcare and close outpatient follow-up.    The patient met 2-midnight criteria for an inpatient stay at the time of discharge.    Discharge Date  9/28/22    FOLLOW UP ITEMS POST DISCHARGE  Neurosurgery  Pcp  Home health    DISCHARGE DIAGNOSES  Principal Problem:    Thrombus POA: Unknown  Active Problems:    Hyperlipidemia (Chronic) POA: Yes    Advanced care planning/counseling discussion POA: Unknown      Overview: IMO load March 2020    Spondylolisthesis, lumbar region POA: Yes      Overview: Added automatically from request for surgery [id number]  Resolved Problems:    Constipation POA: Unknown    Chest pain POA:  Unknown      FOLLOW UP  Future Appointments   Date Time Provider Department Center   10/5/2022 11:00 AM Yvan Shearer P.A.-C. [facility]   10/14/2022  1:20 PM Haseeb Serrano M.D. [facility] None   11/17/2022 10:30 AM Yvan Shearer P.A.-C. [facility]   12/16/2022  3:00 PM [facility] PHARMACIST [facility]   12/29/2022  2:30 PM Deanna Escoto M.D. [facility] None   12/29/2022  3:15 [facility]     GERALDINE HornerRRosaliaN.  [address] [id number] [phone]    Follow up        MEDICATIONS ON DISCHARGE     Medication List        START taking these medications        Instructions   bisacodyl 10 MG Supp  Commonly known as: DULCOLAX   Insert 1 Suppository into the rectum 1 time a day as needed (constipation) for up to 10 days.  Dose: 10 mg     docusate calcium 240 MG Caps  Start taking on: September 29, 2022  Commonly known as: SURFAK   Take 1 Capsule by mouth every day.  Dose: 240 mg            CHANGE how you take these medications        Instructions   gabapentin 300 MG Caps  What changed: when to take this  Commonly known as: NEURONTIN   Take 2 Capsules by mouth at bedtime.  Dose: 600 mg            CONTINUE taking these medications        Instructions   Acetaminophen Extra Strength 500 MG Tabs  Generic drug: acetaminophen   Take 2 Tablets by mouth every 6 hours.  Dose: 1,000 mg     atorvastatin 10 MG Tabs  Commonly known as: LIPITOR   Take 1 Tablet by mouth every evening.  Dose: 10 mg     CALCIUM 600 PO   Take 300 mg by mouth.  Dose: 300 mg     enoxaparin 40 MG/0.4ML Sosy inj  Commonly known as: Lovenox   Inject 40 mg under the skin every day for 21 days. Start today 9/14/2022 last day 9/19/2022. Then to restart on 9/23/2022.  Dose: 40 mg     lidocaine 5 % Ptch  Commonly known as: LIDODERM   APPLY ONE PATCH TO CLEAN DRY SKIN AS DIRECTED DAILY     melatonin 5 mg Tabs   Take 10 mg by mouth every evening.  Dose: 10 mg     omeprazole 20 MG  delayed-release capsule  Commonly known as: PRILOSEC   Take 1 Capsule by mouth every day.  Dose: 20 mg     oxyCODONE immediate-release 5 MG Tabs  Commonly known as: ROXICODONE   Take 1 Tablet by mouth every four hours as needed for Severe Pain for up to 7 days.  Dose: 5 mg     polyethylene glycol/lytes 17 g Pack  Commonly known as: MIRALAX   Take 17 g by mouth every day. 1/2 TSP AM  Dose: 17 g     PRESERVISION AREDS PO   Take 1 Tablet by mouth 2 times a day.  Dose: 1 Tablet     Sodium Fluoride 5000 Plus 1.1 % Crea  Generic drug: SODIUM FLUORIDE (DENTAL GEL)   BRUSH WITH PEA SIZED AMOUNT EVERY DAY PREFERABLY BEFORE BEDTIME. SPIT OUT ALL EXCESS. DO NOT RINSE     tizanidine 2 MG tablet  Commonly known as: ZANAFLEX   Take 1 Tablet by mouth 3 times a day as needed (muscle spasm).  Dose: 2 mg     vitamin D 1000 Unit (25 mcg) Tabs  Commonly known as: cholecalciferol   Take 500 Units by mouth every morning.  Dose: 500 Units            STOP taking these medications      cephALEXin 500 MG Caps  Commonly known as: KEFLEX     Xarelto 20 MG Tabs tablet  Generic drug: rivaroxaban              Allergies  Allergies   Allergen Reactions    Wound Dressing Adhesive      Pt says band-aids cause skin reaction/rash if on for more than 2 days  Paper tape OK         DIET  Orders Placed This Encounter   Procedures    Diet Order Diet: Cardiac     Standing Status:   Standing     Number of Occurrences:   1     Order Specific Question:   Diet:     Answer:   Cardiac [6]       ACTIVITY  As tolerated.  Weight bearing as tolerated    CONSULTATIONS  Neurosurgery  IR    PROCEDURES  MR-LUMBAR SPINE-W/O   Final Result      1.  Previous posterior lumbar fusion from L3 through L5 bilaterally. Previous placement disc lesion device for at the L4-5 level. Previous laminectomy at the L4-5 level.      2.  Small elliptical dorsal epidural postoperative seroma extending from the L3-4 level to the L5 level effacing the posterior aspect of the thecal sac.      3.   Mild degenerative anterolisthesis at the L3-4 level.      4.  Minimal lumbar spondylotic change and mild broad-based disc bulging effacing the ventral surface of thecal sac at the L3-4 level. Additionally there is mild central canal stenosis at this level and the L4-5 level secondary to facet arthropathy.      5.  Mild central disc protrusion at the L1-2 level effacing the ventral surface of thecal sac.      6.  Mild central canal stenosis at the L2-3 level secondary to facet arthropathy. Further there is minimal lumbar spondylotic changes at this level.      7.  Mild multilevel lumbar spondylotic changes throughout the lumbar region.      IR-INSERT IVC FILTER WITH IG & SI   Final Result      1. Ultrasound and fluoroscopic guided placement of a Argon Option retrievable caval filter in the infrarenal IVC.   The Argon Option retrievable filter is suitable for removal under usual circumstances to 180 days (6 months) and sometimes up to one year and longer. This filter may also be used as a permanent filter. Please consult Interventional Radiology for filter    removal if indicated.      2. Inferior vena cavagram within normal limits with no evidence of caval thrombus or occlusion.      US-EXTREMITY VENOUS LOWER UNILAT LEFT   Final Result            LABORATORY  Lab Results   Component Value Date    SODIUM 132 (L) 09/27/2022    POTASSIUM 4.6 09/27/2022    CHLORIDE 102 09/27/2022    CO2 24 09/27/2022    GLUCOSE 103 (H) 09/27/2022    BUN 13 09/27/2022    CREATININE 0.35 (L) 09/27/2022        Lab Results   Component Value Date    WBC 6.1 09/27/2022    HEMOGLOBIN 9.8 (L) 09/27/2022    HEMATOCRIT 29.6 (L) 09/27/2022    PLATELETCT 191 09/27/2022        Total time of the discharge process exceeds 40 minutes.

## 2022-09-29 ENCOUNTER — PATIENT OUTREACH (OUTPATIENT)
Dept: MEDICAL GROUP | Facility: PHYSICIAN GROUP | Age: 87
End: 2022-09-29
Payer: MEDICARE

## 2022-10-03 ENCOUNTER — HOSPITAL ENCOUNTER (INPATIENT)
Facility: MEDICAL CENTER | Age: 87
LOS: 2 days | DRG: 813 | End: 2022-10-06
Attending: EMERGENCY MEDICINE | Admitting: STUDENT IN AN ORGANIZED HEALTH CARE EDUCATION/TRAINING PROGRAM
Payer: MEDICARE

## 2022-10-03 ENCOUNTER — APPOINTMENT (OUTPATIENT)
Dept: RADIOLOGY | Facility: MEDICAL CENTER | Age: 87
DRG: 813 | End: 2022-10-03
Attending: EMERGENCY MEDICINE
Payer: MEDICARE

## 2022-10-03 DIAGNOSIS — T40.2X5A CONSTIPATION DUE TO OPIOID THERAPY: ICD-10-CM

## 2022-10-03 DIAGNOSIS — K59.03 CONSTIPATION DUE TO OPIOID THERAPY: ICD-10-CM

## 2022-10-03 DIAGNOSIS — M54.50 ACUTE RIGHT-SIDED LOW BACK PAIN WITHOUT SCIATICA: ICD-10-CM

## 2022-10-03 PROBLEM — K68.3 RETROPERITONEAL HEMATOMA: Status: ACTIVE | Noted: 2022-10-03

## 2022-10-03 LAB
ALBUMIN SERPL BCP-MCNC: 3.4 G/DL (ref 3.2–4.9)
ALBUMIN/GLOB SERPL: 1.2 G/DL
ALP SERPL-CCNC: 73 U/L (ref 30–99)
ALT SERPL-CCNC: 13 U/L (ref 2–50)
ANION GAP SERPL CALC-SCNC: 10 MMOL/L (ref 7–16)
AST SERPL-CCNC: 16 U/L (ref 12–45)
BASOPHILS # BLD AUTO: 0.6 % (ref 0–1.8)
BASOPHILS # BLD: 0.06 K/UL (ref 0–0.12)
BILIRUB SERPL-MCNC: 0.6 MG/DL (ref 0.1–1.5)
BUN SERPL-MCNC: 22 MG/DL (ref 8–22)
CALCIUM SERPL-MCNC: 8.1 MG/DL (ref 8.5–10.5)
CHLORIDE SERPL-SCNC: 98 MMOL/L (ref 96–112)
CO2 SERPL-SCNC: 24 MMOL/L (ref 20–33)
CREAT SERPL-MCNC: 0.54 MG/DL (ref 0.5–1.4)
EOSINOPHIL # BLD AUTO: 0.04 K/UL (ref 0–0.51)
EOSINOPHIL NFR BLD: 0.4 % (ref 0–6.9)
ERYTHROCYTE [DISTWIDTH] IN BLOOD BY AUTOMATED COUNT: 45.7 FL (ref 35.9–50)
ERYTHROCYTE [DISTWIDTH] IN BLOOD BY AUTOMATED COUNT: 46.5 FL (ref 35.9–50)
GFR SERPLBLD CREATININE-BSD FMLA CKD-EPI: 90 ML/MIN/1.73 M 2
GLOBULIN SER CALC-MCNC: 2.9 G/DL (ref 1.9–3.5)
GLUCOSE SERPL-MCNC: 156 MG/DL (ref 65–99)
HCT VFR BLD AUTO: 28 % (ref 37–47)
HCT VFR BLD AUTO: 31.8 % (ref 37–47)
HGB BLD-MCNC: 10.3 G/DL (ref 12–16)
HGB BLD-MCNC: 9 G/DL (ref 12–16)
IMM GRANULOCYTES # BLD AUTO: 0.04 K/UL (ref 0–0.11)
IMM GRANULOCYTES NFR BLD AUTO: 0.4 % (ref 0–0.9)
INR PPP: 1.71 (ref 0.87–1.13)
LYMPHOCYTES # BLD AUTO: 0.39 K/UL (ref 1–4.8)
LYMPHOCYTES NFR BLD: 3.6 % (ref 22–41)
MAGNESIUM SERPL-MCNC: 2.2 MG/DL (ref 1.5–2.5)
MCH RBC QN AUTO: 30.8 PG (ref 27–33)
MCH RBC QN AUTO: 31.1 PG (ref 27–33)
MCHC RBC AUTO-ENTMCNC: 32.1 G/DL (ref 33.6–35)
MCHC RBC AUTO-ENTMCNC: 32.4 G/DL (ref 33.6–35)
MCV RBC AUTO: 95.9 FL (ref 81.4–97.8)
MCV RBC AUTO: 96.1 FL (ref 81.4–97.8)
MONOCYTES # BLD AUTO: 0.64 K/UL (ref 0–0.85)
MONOCYTES NFR BLD AUTO: 5.9 % (ref 0–13.4)
NEUTROPHILS # BLD AUTO: 9.63 K/UL (ref 2–7.15)
NEUTROPHILS NFR BLD: 89.1 % (ref 44–72)
NRBC # BLD AUTO: 0 K/UL
NRBC BLD-RTO: 0 /100 WBC
PHOSPHATE SERPL-MCNC: 2.9 MG/DL (ref 2.5–4.5)
PLATELET # BLD AUTO: 326 K/UL (ref 164–446)
PLATELET # BLD AUTO: 328 K/UL (ref 164–446)
PMV BLD AUTO: 10.5 FL (ref 9–12.9)
PMV BLD AUTO: 9.8 FL (ref 9–12.9)
POTASSIUM SERPL-SCNC: 4.4 MMOL/L (ref 3.6–5.5)
PROT SERPL-MCNC: 6.3 G/DL (ref 6–8.2)
PROTHROMBIN TIME: 19.7 SEC (ref 12–14.6)
RBC # BLD AUTO: 2.92 M/UL (ref 4.2–5.4)
RBC # BLD AUTO: 3.31 M/UL (ref 4.2–5.4)
SODIUM SERPL-SCNC: 132 MMOL/L (ref 135–145)
WBC # BLD AUTO: 10.5 K/UL (ref 4.8–10.8)
WBC # BLD AUTO: 10.8 K/UL (ref 4.8–10.8)

## 2022-10-03 PROCEDURE — G0378 HOSPITAL OBSERVATION PER HR: HCPCS

## 2022-10-03 PROCEDURE — 80053 COMPREHEN METABOLIC PANEL: CPT

## 2022-10-03 PROCEDURE — 36415 COLL VENOUS BLD VENIPUNCTURE: CPT

## 2022-10-03 PROCEDURE — 96375 TX/PRO/DX INJ NEW DRUG ADDON: CPT

## 2022-10-03 PROCEDURE — 83735 ASSAY OF MAGNESIUM: CPT

## 2022-10-03 PROCEDURE — 85610 PROTHROMBIN TIME: CPT

## 2022-10-03 PROCEDURE — 76705 ECHO EXAM OF ABDOMEN: CPT

## 2022-10-03 PROCEDURE — 700117 HCHG RX CONTRAST REV CODE 255: Performed by: EMERGENCY MEDICINE

## 2022-10-03 PROCEDURE — 74174 CTA ABD&PLVS W/CONTRAST: CPT

## 2022-10-03 PROCEDURE — A9270 NON-COVERED ITEM OR SERVICE: HCPCS | Performed by: STUDENT IN AN ORGANIZED HEALTH CARE EDUCATION/TRAINING PROGRAM

## 2022-10-03 PROCEDURE — 96374 THER/PROPH/DIAG INJ IV PUSH: CPT

## 2022-10-03 PROCEDURE — 700102 HCHG RX REV CODE 250 W/ 637 OVERRIDE(OP): Performed by: STUDENT IN AN ORGANIZED HEALTH CARE EDUCATION/TRAINING PROGRAM

## 2022-10-03 PROCEDURE — 85027 COMPLETE CBC AUTOMATED: CPT

## 2022-10-03 PROCEDURE — 96376 TX/PRO/DX INJ SAME DRUG ADON: CPT

## 2022-10-03 PROCEDURE — 99220 PR INITIAL OBSERVATION CARE,LEVL III: CPT | Performed by: STUDENT IN AN ORGANIZED HEALTH CARE EDUCATION/TRAINING PROGRAM

## 2022-10-03 PROCEDURE — 85025 COMPLETE CBC W/AUTO DIFF WBC: CPT

## 2022-10-03 PROCEDURE — 84100 ASSAY OF PHOSPHORUS: CPT

## 2022-10-03 PROCEDURE — 700111 HCHG RX REV CODE 636 W/ 250 OVERRIDE (IP): Performed by: EMERGENCY MEDICINE

## 2022-10-03 PROCEDURE — 700111 HCHG RX REV CODE 636 W/ 250 OVERRIDE (IP)

## 2022-10-03 PROCEDURE — 700111 HCHG RX REV CODE 636 W/ 250 OVERRIDE (IP): Performed by: NURSE PRACTITIONER

## 2022-10-03 PROCEDURE — 99285 EMERGENCY DEPT VISIT HI MDM: CPT

## 2022-10-03 RX ORDER — ATORVASTATIN CALCIUM 10 MG/1
10 TABLET, FILM COATED ORAL EVERY EVENING
Status: DISCONTINUED | OUTPATIENT
Start: 2022-10-03 | End: 2022-10-06 | Stop reason: HOSPADM

## 2022-10-03 RX ORDER — ONDANSETRON 2 MG/ML
4 INJECTION INTRAMUSCULAR; INTRAVENOUS EVERY 4 HOURS PRN
Status: DISCONTINUED | OUTPATIENT
Start: 2022-10-03 | End: 2022-10-06 | Stop reason: HOSPADM

## 2022-10-03 RX ORDER — MORPHINE SULFATE 4 MG/ML
4 INJECTION INTRAVENOUS ONCE
Status: COMPLETED | OUTPATIENT
Start: 2022-10-03 | End: 2022-10-03

## 2022-10-03 RX ORDER — HYDROMORPHONE HYDROCHLORIDE 1 MG/ML
0.5 INJECTION, SOLUTION INTRAMUSCULAR; INTRAVENOUS; SUBCUTANEOUS ONCE
Status: COMPLETED | OUTPATIENT
Start: 2022-10-03 | End: 2022-10-03

## 2022-10-03 RX ORDER — GABAPENTIN 300 MG/1
600 CAPSULE ORAL
Status: DISCONTINUED | OUTPATIENT
Start: 2022-10-03 | End: 2022-10-06 | Stop reason: HOSPADM

## 2022-10-03 RX ORDER — CHOLECALCIFEROL (VITAMIN D3) 125 MCG
5 CAPSULE ORAL NIGHTLY
Status: DISCONTINUED | OUTPATIENT
Start: 2022-10-03 | End: 2022-10-06 | Stop reason: HOSPADM

## 2022-10-03 RX ORDER — ACETAMINOPHEN 325 MG/1
650 TABLET ORAL EVERY 6 HOURS PRN
Status: DISCONTINUED | OUTPATIENT
Start: 2022-10-03 | End: 2022-10-06 | Stop reason: HOSPADM

## 2022-10-03 RX ORDER — OMEPRAZOLE 20 MG/1
20 CAPSULE, DELAYED RELEASE ORAL DAILY
Status: DISCONTINUED | OUTPATIENT
Start: 2022-10-03 | End: 2022-10-06 | Stop reason: HOSPADM

## 2022-10-03 RX ORDER — AMOXICILLIN 250 MG
2 CAPSULE ORAL 2 TIMES DAILY
Status: DISCONTINUED | OUTPATIENT
Start: 2022-10-03 | End: 2022-10-06 | Stop reason: HOSPADM

## 2022-10-03 RX ORDER — BISACODYL 10 MG
10 SUPPOSITORY, RECTAL RECTAL
Status: DISCONTINUED | OUTPATIENT
Start: 2022-10-03 | End: 2022-10-06 | Stop reason: HOSPADM

## 2022-10-03 RX ORDER — POLYETHYLENE GLYCOL 3350 17 G/17G
1 POWDER, FOR SOLUTION ORAL
Status: DISCONTINUED | OUTPATIENT
Start: 2022-10-03 | End: 2022-10-06 | Stop reason: HOSPADM

## 2022-10-03 RX ORDER — ONDANSETRON 4 MG/1
4 TABLET, ORALLY DISINTEGRATING ORAL EVERY 4 HOURS PRN
Status: DISCONTINUED | OUTPATIENT
Start: 2022-10-03 | End: 2022-10-06 | Stop reason: HOSPADM

## 2022-10-03 RX ORDER — TIZANIDINE 4 MG/1
2 TABLET ORAL 3 TIMES DAILY PRN
Status: DISCONTINUED | OUTPATIENT
Start: 2022-10-03 | End: 2022-10-06 | Stop reason: HOSPADM

## 2022-10-03 RX ORDER — ACETAMINOPHEN 500 MG
750 TABLET ORAL 2 TIMES DAILY PRN
Status: ON HOLD | COMMUNITY

## 2022-10-03 RX ORDER — LABETALOL HYDROCHLORIDE 5 MG/ML
10 INJECTION, SOLUTION INTRAVENOUS EVERY 4 HOURS PRN
Status: DISCONTINUED | OUTPATIENT
Start: 2022-10-03 | End: 2022-10-06 | Stop reason: HOSPADM

## 2022-10-03 RX ADMIN — OMEPRAZOLE 20 MG: 20 CAPSULE, DELAYED RELEASE ORAL at 14:48

## 2022-10-03 RX ADMIN — HYDROMORPHONE HYDROCHLORIDE 0.5 MG: 1 INJECTION, SOLUTION INTRAMUSCULAR; INTRAVENOUS; SUBCUTANEOUS at 20:22

## 2022-10-03 RX ADMIN — SENNOSIDES AND DOCUSATE SODIUM 2 TABLET: 50; 8.6 TABLET ORAL at 18:27

## 2022-10-03 RX ADMIN — ONDANSETRON 4 MG: 2 INJECTION INTRAMUSCULAR; INTRAVENOUS at 08:58

## 2022-10-03 RX ADMIN — MORPHINE SULFATE 4 MG: 4 INJECTION, SOLUTION INTRAMUSCULAR; INTRAVENOUS at 08:58

## 2022-10-03 RX ADMIN — Medication 5 MG: at 20:22

## 2022-10-03 RX ADMIN — GABAPENTIN 600 MG: 300 CAPSULE ORAL at 20:22

## 2022-10-03 RX ADMIN — ATORVASTATIN CALCIUM 10 MG: 10 TABLET, FILM COATED ORAL at 18:27

## 2022-10-03 RX ADMIN — IOHEXOL 100 ML: 350 INJECTION, SOLUTION INTRAVENOUS at 11:10

## 2022-10-03 RX ADMIN — MORPHINE SULFATE 4 MG: 4 INJECTION, SOLUTION INTRAMUSCULAR; INTRAVENOUS at 11:13

## 2022-10-03 ASSESSMENT — LIFESTYLE VARIABLES
EVER HAD A DRINK FIRST THING IN THE MORNING TO STEADY YOUR NERVES TO GET RID OF A HANGOVER: NO
AVERAGE NUMBER OF DAYS PER WEEK YOU HAVE A DRINK CONTAINING ALCOHOL: 0
TOTAL SCORE: 0
HAVE YOU EVER FELT YOU SHOULD CUT DOWN ON YOUR DRINKING: NO
EVER FELT BAD OR GUILTY ABOUT YOUR DRINKING: NO
DOES PATIENT WANT TO STOP DRINKING: NO
ALCOHOL_USE: NO
HAVE PEOPLE ANNOYED YOU BY CRITICIZING YOUR DRINKING: NO
TOTAL SCORE: 0
TOTAL SCORE: 0
HAVE PEOPLE ANNOYED YOU BY CRITICIZING YOUR DRINKING: NO
ALCOHOL_USE: NO
EVER FELT BAD OR GUILTY ABOUT YOUR DRINKING: NO
EVER HAD A DRINK FIRST THING IN THE MORNING TO STEADY YOUR NERVES TO GET RID OF A HANGOVER: NO
HAVE YOU EVER FELT YOU SHOULD CUT DOWN ON YOUR DRINKING: NO
HOW MANY TIMES IN THE PAST YEAR HAVE YOU HAD 5 OR MORE DRINKS IN A DAY: 0
HOW MANY TIMES IN THE PAST YEAR HAVE YOU HAD 5 OR MORE DRINKS IN A DAY: 0
TOTAL SCORE: 0
TOTAL SCORE: 0
ON A TYPICAL DAY WHEN YOU DRINK ALCOHOL HOW MANY DRINKS DO YOU HAVE: 0
CONSUMPTION TOTAL: NEGATIVE
TOTAL SCORE: 0
AVERAGE NUMBER OF DAYS PER WEEK YOU HAVE A DRINK CONTAINING ALCOHOL: 0
CONSUMPTION TOTAL: NEGATIVE
SUBSTANCE_ABUSE: 0
ON A TYPICAL DAY WHEN YOU DRINK ALCOHOL HOW MANY DRINKS DO YOU HAVE: 0
DOES PATIENT WANT TO STOP DRINKING: NO

## 2022-10-03 ASSESSMENT — ENCOUNTER SYMPTOMS
FEVER: 0
MYALGIAS: 0
COUGH: 0
FOCAL WEAKNESS: 0
SENSORY CHANGE: 0
CHILLS: 0
VOMITING: 0
SPEECH CHANGE: 0
SHORTNESS OF BREATH: 0
ABDOMINAL PAIN: 1
PALPITATIONS: 0
NERVOUS/ANXIOUS: 0
DIARRHEA: 0
BACK PAIN: 1
NAUSEA: 0
PHOTOPHOBIA: 0
EYE PAIN: 0

## 2022-10-03 ASSESSMENT — FIBROSIS 4 INDEX
FIB4 SCORE: 1.17
FIB4 SCORE: 1.67

## 2022-10-03 ASSESSMENT — PAIN DESCRIPTION - PAIN TYPE: TYPE: ACUTE PAIN

## 2022-10-03 NOTE — CARE PLAN
Problem: Pain - Standard  Goal: Alleviation of pain or a reduction in pain to the patient’s comfort goal  Outcome: Progressing     Problem: Knowledge Deficit - Standard  Goal: Patient and family/care givers will demonstrate understanding of plan of care, disease process/condition, diagnostic tests and medications  Outcome: Progressing     Problem: Skin Integrity  Goal: Skin integrity is maintained or improved  Outcome: Progressing   The patient is Stable - Low risk of patient condition declining or worsening    Shift Goals  Clinical Goals: Pain free  Patient Goals: Pain free  Family Goals: Not present    Progress made toward(s) clinical / shift goals:  The patient understands plan of care  She states her pain level is better    Patient is not progressing towards the following goals:

## 2022-10-03 NOTE — ED PROVIDER NOTES
"ED Provider Note    CHIEF COMPLAINT   Chief Complaint   Patient presents with    Groin Pain     Pt states she recently had a surgery on her back and was diagnosed with a DVT post operatively in the left leg. Last night pt began having similar pain in the right groin and is concerned she may have another DVT.        ORLANDO Mathur is a 87 y.o. female who presents with a chief complaint of right groin pain.  Sudden onset.  Right groin.  Happened this morning and last night.  Slightly alleviated with fentanyl given by EMS.  But still in pain.  Worse when she moves it better better when she keeps it still.  No associated discoloration of her foot.    She has a past history of DVT.  It appears that she was put on anticoagulation Lovenox and Xarelto.  She also had an IVC filter placed.  This was done on the 26th.        REVIEW OF SYSTEMS   Cardiovascular: No discoloration of her toes.  Musculoskeletal:  See above  Dermatologic: No lacerations or abrasions or discharge from the wound  Neurologic: No numbness or tingling  Her foot.    PAST MEDICAL HISTORY   Past Medical History:   Diagnosis Date    Adenocarcinoma of colon (HCC) 10/30/2018    From sessile serrated polyp 10/2018    Anesthesia     pt states \"one new dr scraped my esophagus when they put the tube in and I started bleeding so they couldn't operate\"     Atrial fibrillation [I48.91] 04/19/2016     pt denies     Back pain 06/01/2022    0/10    Blood clotting disorder (HCC) 2012    clot in leg    Bowel habit changes     constipation     Cancer (HCC)     skin, colon 2018    Cataract     belia IOL     Chronic back pain greater than 3 months duration     Deep vein thrombosis (HCC) 03/08/2016    First occurrence in LLE in late 1970s Second occurrence further up in LLE in 2012, has been on AC since     Essential hypertension, benign 06/27/2012    GERD (gastroesophageal reflux disease) 06/27/2012    Heart murmur     High cholesterol     Hyperlipidemia     " Hypertension     hx of, not currently     Impaired fasting glucose 11/02/2017    Melanoma ([facility])     Mild aortic stenosis     Seeing Dr. Van    Other and unspecified hyperlipidemia 06/27/2012    Prediabetes     Protein S deficiency ([facility]) 11/02/2017    Noted in lab work 2013 as a part of work up at [facility]     Rheumatoid arthritis involving multiple sites with positive rheumatoid factor ([facility]) 03/14/2016    Dr. 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        SURGICAL HISTORY  Past Surgical History:   Procedure Laterality Date    LUMBAR FUSION O-ARM  9/21/2022    Procedure: L3-4 and L4-5 decompressive laminectomy, bilateral foraminotomies and L3-4-5 posterior lumbar instrumented fusion;  Surgeon: Rosa M Bonner M.D.;  Location: SURGERY [facility];  Service: Neurosurgery    LUMBAR DECOMPRESSION  9/21/2022    Procedure: DECOMPRESSION, SPINE, LUMBAR;  Surgeon: Rosa M Bonner M.D.;  Location: SURGERY [facility];  Service: Neurosurgery    LUMBAR LAMINECTOMY DISKECTOMY  9/21/2022    Procedure: LAMINECTOMY, SPINE, LUMBAR, WITH DISCECTOMY;  Surgeon: Rosa M Bonner M.D.;  Location: SURGERY [facility];  Service: Neurosurgery    FORAMINOTOMY  9/21/2022    Procedure: FORAMINOTOMY, SPINE;  Surgeon: Rosa M Bonner M.D.;  Location: SURGERY [facility];  Service: Neurosurgery    AK INJECTION,SACROILIAC JOINT Right 7/12/2022    Procedure: RIGHT  sacroiliac joint injection with fluoroscopic guidance.;  Surgeon: Indira Lee M.D.;  [facility];  Service: Pain Management    MT REMOVE ARMPITS LYMPH NODES COMPLT Left 6/7/2022    Procedure: LYMPHADENECTOMY, AXILLARY;  Surgeon: Bernardino Torres M.D.;  Location: SURGERY [facility];  Service: General    BLOCK EPIDURAL STEROID INJECTION Right 5/31/2022    Procedure: RIGHT L4-5 and L5-S1 transforaminal epidural steroid injection with fluoroscopic guidance.;  Surgeon: Indira Lee M.D.;  Location: [facility];  Service: Pain Management    BLOCK EPIDURAL STEROID INJECTION Right 05/10/2022    Procedure: Lumbar L4-5 interlaminar epidural steroid injection;  Surgeon: Indira Lee M.D.;  Location: [facility];  Service: Pain Management    WIDE EXCISION, LESION, HEAD AND NECK REGION Left 03/29/2022    Procedure: WIDE EXCISION, LESION, HEAD AND NECK REGION - RADICAL MALIGNANT MELANOMA OF LEFT CHEST;  Surgeon: Bernardino Torres M.D.;  Location: SURGERY SAME DAY [facility];  Service: General    LUMBAR MEDIAL BRANCH BLOCKS Bilateral 12/15/2020    Procedure: BLOCK, NERVE, SPINAL, LUMBAR, POSTERIOR RAMUS, MEDIAL BRANCH;  Surgeon: Chris Cooper M.D.;  Location: [facility];  Service: Pain Management    IRRIGATION & DEBRIDEMENT ORTHO Bilateral 07/31/2020    Procedure: IRRIGATION AND DEBRIDEMENT, WOUND - KNEE;  Surgeon: Roosevelt Saucedo M.D.;  Location: SURGERY [facility];  Service: Orthopedics    HEMICOLECTOMY Right 12/13/2018    Procedure: OPEN RIGHT HEMICOLECTOMY;  Surgeon: Dash Bhagat M.D.;  Location: SURGERY [facility];  Service: General    INGUINAL HERNIA REPAIR Right 09/23/2016    Procedure: INGUINAL HERNIA REPAIR - PRIMARY;  Surgeon: Mary Medina M.D.;  Location: SURGERY [facility];  Service:     OTHER  08/12/2014    peroneal nerve surgery Dr. Castro     OTHER ORTHOPEDIC SURGERY  2011    meniscus repair  "   ARTHROPLASTY Left     hip replacement    CHOLECYSTECTOMY      HYSTERECTOMY, TOTAL ABDOMINAL  1970's    KNEE ARTHROPLASTY TOTAL Left     ROTATOR CUFF REPAIR Bilateral        CURRENT MEDICATIONS   Home Medications    **Home medications have not yet been reviewed for this encounter**         ALLERGIES   Allergies   Allergen Reactions    Wound Dressing Adhesive      Pt says band-aids cause skin reaction/rash if on for more than 2 days  Paper tape OK         PHYSICAL EXAM  VITAL SIGNS: BP (!) 163/74   Pulse 81   Temp 36.8 °C (98.2 °F) (Temporal)   Resp 18   Ht 1.575 m (5' 2\")   Wt 77.1 kg (170 lb)   LMP  (LMP Unknown)   SpO2 93%   BMI 31.09 kg/m²    Constitutional: Well developed, Well nourished, No acute distress, Non-toxic appearance.   Cardiovascular: Good pulses on the affected extremity. Good capillary refill.  Thorax & Lungs: No respiratory distress  Skin: Patient has a wound dressing placed over the groin it is intact there is no erythema.  Bruising is noted  Musculoskeletal: Patient has good range of motion of her toes and knee.  Limited in the hip secondary to pain.  Neurologic: Good sensation to light touch on the distal affected extremity.    RADIOLOGY/PROCEDURES  CTA ABDOMEN PELVIS W & W/O POST PROCESS   Final Result      1.  Large RIGHT retroperitoneal hematoma without evidence of active arterial extravasation   2.  No evidence of aortic dissection or hemodynamically significant stenosis   3.  LEFT lower quadrant chronic fat necrosis redemonstrated. This is not demonstrably changed.   4.  Prior cholecystectomy with mild biliary dilatation which could be compensatory   5.  Trace RIGHT pleural effusion      Findings were communicated with and acknowledged by LULA ROJO via [employer] on 10/3/2022 11:21 AM.      US-ABDOMEN LTD (SOFT TISSUE)   Final Result      No evidence of RIGHT groin mass or fluid collection        Results for orders placed or performed during the hospital encounter of " 10/03/22   CBC WITH DIFFERENTIAL   Result Value Ref Range    WBC 10.8 4.8 - 10.8 K/uL    RBC 3.31 (L) 4.20 - 5.40 M/uL    Hemoglobin 10.3 (L) 12.0 - 16.0 g/dL    Hematocrit 31.8 (L) 37.0 - 47.0 %    MCV 96.1 81.4 - 97.8 fL    MCH 31.1 27.0 - 33.0 pg    MCHC 32.4 (L) 33.6 - 35.0 g/dL    RDW 45.7 35.9 - 50.0 fL    Platelet Count 328 164 - 446 K/uL    MPV 10.5 9.0 - 12.9 fL    Neutrophils-Polys 89.10 (H) 44.00 - 72.00 %    Lymphocytes 3.60 (L) 22.00 - 41.00 %    Monocytes 5.90 0.00 - 13.40 %    Eosinophils 0.40 0.00 - 6.90 %    Basophils 0.60 0.00 - 1.80 %    Immature Granulocytes 0.40 0.00 - 0.90 %    Nucleated RBC 0.00 /100 WBC    Neutrophils (Absolute) 9.63 (H) 2.00 - 7.15 K/uL    Lymphs (Absolute) 0.39 (L) 1.00 - 4.80 K/uL    Monos (Absolute) 0.64 0.00 - 0.85 K/uL    Eos (Absolute) 0.04 0.00 - 0.51 K/uL    Baso (Absolute) 0.06 0.00 - 0.12 K/uL    Immature Granulocytes (abs) 0.04 0.00 - 0.11 K/uL    NRBC (Absolute) 0.00 K/uL   COMP METABOLIC PANEL   Result Value Ref Range    Sodium 132 (L) 135 - 145 mmol/L    Potassium 4.4 3.6 - 5.5 mmol/L    Chloride 98 96 - 112 mmol/L    Co2 24 20 - 33 mmol/L    Anion Gap 10.0 7.0 - 16.0    Glucose 156 (H) 65 - 99 mg/dL    Bun 22 8 - 22 mg/dL    Creatinine 0.54 0.50 - 1.40 mg/dL    Calcium 8.1 (L) 8.5 - 10.5 mg/dL    AST(SGOT) 16 12 - 45 U/L    ALT(SGPT) 13 2 - 50 U/L    Alkaline Phosphatase 73 30 - 99 U/L    Total Bilirubin 0.6 0.1 - 1.5 mg/dL    Albumin 3.4 3.2 - 4.9 g/dL    Total Protein 6.3 6.0 - 8.2 g/dL    Globulin 2.9 1.9 - 3.5 g/dL    A-G Ratio 1.2 g/dL   PROTHROMBIN TIME (INR)   Result Value Ref Range    PT 19.7 (H) 12.0 - 14.6 sec    INR 1.71 (H) 0.87 - 1.13   ESTIMATED GFR   Result Value Ref Range    GFR (CKD-EPI) 90 >60 mL/min/1.73 m 2        COURSE & MEDICAL DECISION MAKING  Pertinent Labs & Imaging studies reviewed. (See chart for details)  Patient with IVC filter with sudden onset of pain postop day 5.  At this point, differential could include simply that perhaps  the clot is there.  No signs of infection or cellulitis.  No fever or findings suggest deep abscess.  Patient has good pedal pulse.    Plan  Consult radiology regarding imaging test of choice I suspect a hematoma or possible other bleeding complication    ED course  Ultrasound showed no hematoma patient continues have significant pain out of proportion exam CT scan vascular is done to rule out dissection versus collection of bleeding that is deeper.    CT scan revealed hematoma in the retroperitoneal space no extravasation.  At this point patient appears grossly stable she has not taken her anticoagulation today.    I will admit her to the hospital service hospital service came and saw the patient.  The patient is in stable condition.    87-year-old female who appears to be on Lovenox and possibly Xarelto with a retroperitoneal bleed.  Patient be admitted in guarded condition  FINAL IMPRESSION  1.  Retroperitoneal bleed  2.  Right flank right hip pain  3.      Electronically signed by: Dash Pearson M.D., 10/3/2022 8:29 AM

## 2022-10-03 NOTE — ED TRIAGE NOTES
"Chief Complaint   Patient presents with    Groin Pain     Pt states she recently had a surgery on her back and was diagnosed with a DVT post operatively in the left leg. Last night pt began having similar pain in the right groin and is concerned she may have another DVT.        Pt BIBA ambulance from home with the above complaint. Pt was prescribed xarelto and has been taking it as prescribed. After surgery they also placed a balloon pump in the right groin. Dressing in place, area is soft but tender. EMS gave 100mcg fentanyl in route. Pt states pain is 10/10.    BP (!) 165/76   Pulse 77   Temp 36.8 °C (98.2 °F) (Temporal)   Resp 18   Ht 1.575 m (5' 2\")   Wt 77.1 kg (170 lb)   LMP  (LMP Unknown)   SpO2 92%   BMI 31.09 kg/m²       "

## 2022-10-03 NOTE — PROGRESS NOTES
4 Eyes Skin Assessment Completed by MARCO Del Valle and Isaías,AEMT    Head WDL  Ears WDL  Nose WDL  Mouth WDL  Neck WDL  Breast/Chest WDL  Shoulder Blades WDL  Spine WDL  (R) Arm/Elbow/Hand WDL  (L) Arm/Elbow/Hand WDL  Abdomen WDL  Groin WDL  Scrotum/Coccyx/Buttocks Excoriation  (R) Leg WDL  (L) Leg WDL  (R) Heel/Foot/Toe WDL  (L) Heel/Foot/Toe WDL          Devices In Places ECG, Blood Pressure Cuff, and Pulse Ox      Interventions In Place Pillows    Possible Skin Injury Yes    Pictures Uploaded Into Epic Yes  Wound Consult Placed Yes  RN Wound Prevention Protocol Ordered Yes

## 2022-10-03 NOTE — DISCHARGE PLANNING
Case Management Discharge Planning    Admission Date: 10/3/2022  GMLOS:    ALOS: 0    6-Clicks ADL Score:    6-Clicks Mobility Score:        Anticipated Discharge Dispo: Discharge Disposition: D/T to home under HHA care in anticipation of covered skilled care (06)  Discharge Address: [address] [place] [address]  Discharge Contact Phone Number: [phone]    DME Needed: Unable to determine at this time. Patient has her own walker    Action(s) Taken: Updated Provider/Nurse on Discharge Plan, Choice obtained, Referral(s) sent, and OTHER: Reviewed chart and updated discharge plan. Reviewed treatment and discharge plans, needs, and barriers with medical and nursing teams. Choice obtained for resumption of HH services on discharge. Patient previously with [facility] and would prefer to resume services.    Escalations Completed: None    Medically Clear: No    Next Steps: f/u with medical and nursing teams regarding discharge planning needs/barriers and assist as indicated. f/u with patient regarding discharge planning needs/barriers and update discharge plan as indicated.    Barriers to Discharge: Medical clearance, Pending Placement, and Pending PT Evaluation    Is the patient up for discharge tomorrow: No

## 2022-10-04 PROBLEM — R58 RETROPERITONEAL BLEED: Status: ACTIVE | Noted: 2022-10-04

## 2022-10-04 LAB
ANION GAP SERPL CALC-SCNC: 9 MMOL/L (ref 7–16)
BUN SERPL-MCNC: 21 MG/DL (ref 8–22)
CALCIUM SERPL-MCNC: 7.5 MG/DL (ref 8.5–10.5)
CHLORIDE SERPL-SCNC: 101 MMOL/L (ref 96–112)
CO2 SERPL-SCNC: 24 MMOL/L (ref 20–33)
CREAT SERPL-MCNC: 0.45 MG/DL (ref 0.5–1.4)
ERYTHROCYTE [DISTWIDTH] IN BLOOD BY AUTOMATED COUNT: 47.1 FL (ref 35.9–50)
ERYTHROCYTE [DISTWIDTH] IN BLOOD BY AUTOMATED COUNT: 47.3 FL (ref 35.9–50)
GFR SERPLBLD CREATININE-BSD FMLA CKD-EPI: 94 ML/MIN/1.73 M 2
GLUCOSE SERPL-MCNC: 134 MG/DL (ref 65–99)
HCT VFR BLD AUTO: 25.2 % (ref 37–47)
HCT VFR BLD AUTO: 25.4 % (ref 37–47)
HGB BLD-MCNC: 8.1 G/DL (ref 12–16)
HGB BLD-MCNC: 8.1 G/DL (ref 12–16)
MCH RBC QN AUTO: 30.9 PG (ref 27–33)
MCH RBC QN AUTO: 31 PG (ref 27–33)
MCHC RBC AUTO-ENTMCNC: 31.9 G/DL (ref 33.6–35)
MCHC RBC AUTO-ENTMCNC: 32.1 G/DL (ref 33.6–35)
MCV RBC AUTO: 96.6 FL (ref 81.4–97.8)
MCV RBC AUTO: 96.9 FL (ref 81.4–97.8)
PLATELET # BLD AUTO: 312 K/UL (ref 164–446)
PLATELET # BLD AUTO: 352 K/UL (ref 164–446)
PMV BLD AUTO: 9.4 FL (ref 9–12.9)
PMV BLD AUTO: 9.4 FL (ref 9–12.9)
POTASSIUM SERPL-SCNC: 4.7 MMOL/L (ref 3.6–5.5)
RBC # BLD AUTO: 2.61 M/UL (ref 4.2–5.4)
RBC # BLD AUTO: 2.62 M/UL (ref 4.2–5.4)
SODIUM SERPL-SCNC: 134 MMOL/L (ref 135–145)
WBC # BLD AUTO: 10.1 K/UL (ref 4.8–10.8)
WBC # BLD AUTO: 8.7 K/UL (ref 4.8–10.8)

## 2022-10-04 PROCEDURE — 80048 BASIC METABOLIC PNL TOTAL CA: CPT

## 2022-10-04 PROCEDURE — 700102 HCHG RX REV CODE 250 W/ 637 OVERRIDE(OP): Performed by: NURSE PRACTITIONER

## 2022-10-04 PROCEDURE — 85027 COMPLETE CBC AUTOMATED: CPT

## 2022-10-04 PROCEDURE — 700102 HCHG RX REV CODE 250 W/ 637 OVERRIDE(OP): Performed by: STUDENT IN AN ORGANIZED HEALTH CARE EDUCATION/TRAINING PROGRAM

## 2022-10-04 PROCEDURE — 99232 SBSQ HOSP IP/OBS MODERATE 35: CPT | Performed by: NURSE PRACTITIONER

## 2022-10-04 PROCEDURE — A9270 NON-COVERED ITEM OR SERVICE: HCPCS | Performed by: STUDENT IN AN ORGANIZED HEALTH CARE EDUCATION/TRAINING PROGRAM

## 2022-10-04 PROCEDURE — A9270 NON-COVERED ITEM OR SERVICE: HCPCS | Performed by: NURSE PRACTITIONER

## 2022-10-04 PROCEDURE — 770001 HCHG ROOM/CARE - MED/SURG/GYN PRIV*

## 2022-10-04 RX ORDER — TRAMADOL HYDROCHLORIDE 50 MG/1
50 TABLET ORAL EVERY 6 HOURS PRN
Status: DISCONTINUED | OUTPATIENT
Start: 2022-10-04 | End: 2022-10-06 | Stop reason: HOSPADM

## 2022-10-04 RX ADMIN — TRAMADOL HYDROCHLORIDE 50 MG: 50 TABLET, COATED ORAL at 19:02

## 2022-10-04 RX ADMIN — OMEPRAZOLE 20 MG: 20 CAPSULE, DELAYED RELEASE ORAL at 06:06

## 2022-10-04 RX ADMIN — ATORVASTATIN CALCIUM 10 MG: 10 TABLET, FILM COATED ORAL at 16:41

## 2022-10-04 RX ADMIN — SENNOSIDES AND DOCUSATE SODIUM 2 TABLET: 50; 8.6 TABLET ORAL at 16:40

## 2022-10-04 RX ADMIN — SENNOSIDES AND DOCUSATE SODIUM 2 TABLET: 50; 8.6 TABLET ORAL at 06:06

## 2022-10-04 RX ADMIN — GABAPENTIN 600 MG: 300 CAPSULE ORAL at 22:11

## 2022-10-04 RX ADMIN — Medication 5 MG: at 22:12

## 2022-10-04 RX ADMIN — ACETAMINOPHEN 650 MG: 325 TABLET, FILM COATED ORAL at 16:40

## 2022-10-04 RX ADMIN — ACETAMINOPHEN 650 MG: 325 TABLET, FILM COATED ORAL at 10:04

## 2022-10-04 RX ADMIN — TRAMADOL HYDROCHLORIDE 50 MG: 50 TABLET, COATED ORAL at 13:14

## 2022-10-04 ASSESSMENT — ENCOUNTER SYMPTOMS
PALPITATIONS: 0
MYALGIAS: 0
NERVOUS/ANXIOUS: 0
FLANK PAIN: 0
SPEECH CHANGE: 0
FOCAL WEAKNESS: 0
EYE PAIN: 0
COUGH: 0
VOMITING: 0
DIARRHEA: 0
SENSORY CHANGE: 0
NAUSEA: 0
SHORTNESS OF BREATH: 0
ABDOMINAL PAIN: 0
PHOTOPHOBIA: 0
FEVER: 0
CHILLS: 0
BACK PAIN: 1

## 2022-10-04 ASSESSMENT — LIFESTYLE VARIABLES: SUBSTANCE_ABUSE: 0

## 2022-10-04 NOTE — H&P
Hospital Medicine History & Physical Note    Date of Service  10/3/2022    Primary Care Physician  CRISTINO Horner    Consultants  None    Code Status  Full Code    Chief Complaint  Chief Complaint   Patient presents with    Groin Pain     Pt states she recently had a surgery on her back and was diagnosed with a DVT post operatively in the left leg. Last night pt began having similar pain in the right groin and is concerned she may have another DVT.        History of Presenting Illness  Marry Mathur is a 87 y.o. 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 fib, rheumatoid arthritis, hyperlipidemia, protein S deficiency, GERD, chronic back pain, who presented 10/3/2022 with groin pain.  Night prior to presentation patient noticed she started having moderate to severe right groin pain described as aching aggravated with movement improved with rest.  She was unable to get comfortable and pain persisted throughout the night into the morning so she presented for evaluation for concerns that she developed another DVT.  She denies any recent fevers or chills, headache or vision changes, chest pain dyspnea cough nausea vomiting dysuria diarrhea.    In the ED she is afebrile normal pulse respiratory rate normal room air oxygen saturation systolic blood pressures range from 129-165.  Initial work-up WBC 10.8 hemoglobin 10.3 normal platelets, sodium 132 normal renal function and liver function, INR 1.71, abdominal ultrasound negative for right groin mass or fluid collection, CTA abdomen pelvis shows large right retroperitoneal hematoma without evidence of active arterial extravasation no evidence of aortic dissection or hemodynamically significant stenosis left lower quadrant chronic fat necrosis redemonstrated, prior cholecystectomy with mild biliary dilatation which could be compensatory, trace right pleural  "effusion.  Patient subsequently referred to hospitalist for admission.    Per discharge summary on 9/28/22 with Dr. Bryson she states \"She was cleared to continue 40 mg of lovenox daily by neurosurgery and she will be discharged on this with a plan to stop it and resume her previous home xarelto in one week\". Patient was under impression she was supposed to take BOTH anticoagulants for a week when she was supposed to be holding her xarelto while on lovenox until today and then she was supposed to restart xarelto.    I discussed the plan of care with patient, bedside RN, and pharmacy.    Review of Systems  Review of Systems   Constitutional:  Negative for chills and fever.   HENT:  Negative for congestion and nosebleeds.    Eyes:  Negative for photophobia and pain.   Respiratory:  Negative for cough and shortness of breath.    Cardiovascular:  Negative for chest pain and palpitations.   Gastrointestinal:  Positive for abdominal pain. Negative for diarrhea, nausea and vomiting.   Genitourinary:  Negative for dysuria and urgency.   Musculoskeletal:  Positive for back pain. Negative for myalgias.   Neurological:  Negative for sensory change, speech change and focal weakness.   Psychiatric/Behavioral:  Negative for substance abuse. The patient is not nervous/anxious.      Past Medical History   has a past medical history of Adenocarcinoma of colon ([facility]) (10/30/2018), Anesthesia, Atrial fibrillation [I48.91] (04/19/2016), Back pain (06/01/2022), Blood clotting disorder ([facility]) (2012), Bowel habit changes, Cancer ([facility]), Cataract, Chronic back pain greater than 3 months duration, Deep vein thrombosis ([facility]) (03/08/2016), Essential hypertension, benign (06/27/2012), GERD (gastroesophageal reflux disease) (06/27/2012), Heart murmur, High cholesterol, Hyperlipidemia, Hypertension, Impaired fasting glucose (11/02/2017), Melanoma ([facility]), Mild aortic stenosis, Other and unspecified hyperlipidemia (06/27/2012), Prediabetes, Protein S " deficiency ([facility]) (11/02/2017), Rheumatoid arthritis involving multiple sites with positive rheumatoid factor ([facility]) (03/14/2016), Rheumatoid nodule ([facility]) (07/25/2017), Right bundle branch block (06/27/2012), and Urinary incontinence (11/02/2017).    Surgical History   has a past surgical history that includes hysterectomy, total abdominal (1970's); arthroplasty (Left); cholecystectomy; other orthopedic surgery (2011); inguinal hernia repair (Right, 09/23/2016); other (08/12/2014); rotator cuff repair (Bilateral); hemicolectomy (Right, 12/13/2018); irrigation & debridement ortho (Bilateral, 07/31/2020); lumbar medial branch blocks (Bilateral, 12/15/2020); wide excision, lesion, head and neck region (Left, 03/29/2022); block epidural steroid injection (Right, 05/10/2022); knee arthroplasty total (Left); block epidural steroid injection (Right, 5/31/2022); pr remove armpits lymph nodes complt (Left, 6/7/2022); pr injection,sacroiliac joint (Right, 7/12/2022); lumbar fusion o-arm (9/21/2022); lumbar decompression (9/21/2022); lumbar laminectomy diskectomy (9/21/2022); and foraminotomy (9/21/2022).     Family History  family history includes Cancer in her father and mother; Heart Disease in her brother and sister; Leukemia in her father.       Social History   reports that she has never smoked. She has never used smokeless tobacco. She reports that she does not currently use alcohol. She reports that she does not currently use drugs.    Allergies  Allergies   Allergen Reactions    Wound Dressing Adhesive      Pt says band-aids cause skin reaction/rash if on for more than 2 days  Paper tape OK         Medications  Prior to Admission Medications   Prescriptions Last Dose Informant Patient Reported? Taking?   acetaminophen (TYLENOL) 500 MG Tab 10/2/2022 at unknown  Yes Yes   Sig: Take 1,000 mg by mouth 3 times a day as needed. Indications: Pain   atorvastatin (LIPITOR) 10 MG Tab 10/2/2022 at pm Patient No No   Sig: Take 1  Tablet by mouth every evening.   enoxaparin (LOVENOX) 40 MG/0.4ML Solution Prefilled Syringe inj 10/2/2022 at am Patient No No   Sig: Inject 40 mg under the skin every day for 21 days. Start today 9/14/2022 last day 9/19/2022. Then to restart on 9/23/2022.   gabapentin (NEURONTIN) 300 MG Cap 10/2/2022 at hs Patient No No   Sig: Take 2 Capsules by mouth at bedtime.   melatonin 5 mg Tab 10/2/2022 at pm Patient Yes No   Sig: Take 10 mg by mouth every evening.   omeprazole (PRILOSEC) 20 MG delayed-release capsule 10/2/2022 at am Patient No No   Sig: Take 1 Capsule by mouth every day.   rivaroxaban (XARELTO) 20 MG Tab tablet 10/2/2022 at pm  Yes Yes   Sig: Take 20 mg by mouth with dinner.   tizanidine (ZANAFLEX) 2 MG tablet unknown at unknown Patient No No   Sig: Take 1 Tablet by mouth 3 times a day as needed (muscle spasm).   vitamin D (CHOLECALCIFEROL) 1000 UNIT Tab 10/2/2022 at am Patient Yes No   Sig: Take 500 Units by mouth every morning.      Facility-Administered Medications: None       Physical Exam  Temp:  [36.6 °C (97.8 °F)-36.9 °C (98.4 °F)] 36.6 °C (97.8 °F)  Pulse:  [61-86] 61  Resp:  [16-18] 16  BP: (129-165)/(61-76) 129/61  SpO2:  [92 %-96 %] 95 %  Blood Pressure : 129/61   Temperature: 36.6 °C (97.8 °F)   Pulse: 61   Respiration: 16   Pulse Oximetry: 95 %       Physical Exam  Vitals and nursing note reviewed.   Constitutional:       General: She is not in acute distress.     Appearance: She is not toxic-appearing.      Comments: 87-year-old female appears stated age alert and conversant able to speak full sentences no acute distress nontoxic-appearing pleasant mood making jokes   HENT:      Head: Normocephalic and atraumatic.      Nose: Nose normal. No rhinorrhea.      Mouth/Throat:      Mouth: Mucous membranes are moist.      Pharynx: Oropharynx is clear.   Eyes:      General: No scleral icterus.     Extraocular Movements: Extraocular movements intact.      Conjunctiva/sclera: Conjunctivae normal.       Pupils: Pupils are equal, round, and reactive to light.   Cardiovascular:      Rate and Rhythm: Normal rate and regular rhythm.      Heart sounds: Murmur heard.   Pulmonary:      Effort: Pulmonary effort is normal. No respiratory distress.      Breath sounds: Normal breath sounds. No wheezing, rhonchi or rales.   Abdominal:      Palpations: Abdomen is soft.      Tenderness: There is abdominal tenderness (right sided abdominal tenderness). There is no guarding or rebound.   Musculoskeletal:         General: Normal range of motion.      Cervical back: Normal range of motion and neck supple. No rigidity or tenderness.      Right lower leg: Edema present.      Left lower leg: Edema present.   Skin:     General: Skin is warm and dry.      Capillary Refill: Capillary refill takes less than 2 seconds.   Neurological:      General: No focal deficit present.      Mental Status: She is alert and oriented to person, place, and time. Mental status is at baseline.      Cranial Nerves: No cranial nerve deficit.      Sensory: No sensory deficit.      Motor: No weakness.      Coordination: Coordination normal.   Psychiatric:         Mood and Affect: Mood normal.         Behavior: Behavior normal.         Thought Content: Thought content normal.         Judgment: Judgment normal.       Laboratory:  Recent Labs     10/03/22  0757 10/03/22  1546   WBC 10.8 10.5   RBC 3.31* 2.92*   HEMOGLOBIN 10.3* 9.0*   HEMATOCRIT 31.8* 28.0*   MCV 96.1 95.9   MCH 31.1 30.8   MCHC 32.4* 32.1*   RDW 45.7 46.5   PLATELETCT 328 326   MPV 10.5 9.8     Recent Labs     10/03/22  0757   SODIUM 132*   POTASSIUM 4.4   CHLORIDE 98   CO2 24   GLUCOSE 156*   BUN 22   CREATININE 0.54   CALCIUM 8.1*     Recent Labs     10/03/22  0757   ALTSGPT 13   ASTSGOT 16   ALKPHOSPHAT 73   TBILIRUBIN 0.6   GLUCOSE 156*     Recent Labs     10/03/22  0757   INR 1.71*     No results for input(s): NTPROBNP in the last 72 hours.      No results for input(s): TROPONINT in the last  "72 hours.    Imaging:  CTA ABDOMEN PELVIS W & W/O POST PROCESS   Final Result      1.  Large RIGHT retroperitoneal hematoma without evidence of active arterial extravasation   2.  No evidence of aortic dissection or hemodynamically significant stenosis   3.  LEFT lower quadrant chronic fat necrosis redemonstrated. This is not demonstrably changed.   4.  Prior cholecystectomy with mild biliary dilatation which could be compensatory   5.  Trace RIGHT pleural effusion      Findings were communicated with and acknowledged by LULA ROJO via [employer] on 10/3/2022 11:21 AM.      US-ABDOMEN LTD (SOFT TISSUE)   Final Result      No evidence of RIGHT groin mass or fluid collection            Assessment/Plan:  Justification for Admission Status  I anticipate this patient is appropriate for observation status at this time because retroperitoneal hematoma      * Retroperitoneal hematoma- (present on admission)  Assessment & Plan  -CTA abdomen pelvis with large right retroperitoneal hematoma without evidence of active arterial extravasation.  -Hgb 9.8 one week ago presented with 10.3, will monitor q8h for now.  -Hold anticoagulation tonight.  -Patient was taking BOTH lovenox and xarelto full dose anticoagulation, this appears to have been a miscommunication because on last DC summary it states: \"She was cleared to continue 40 mg of lovenox daily by neurosurgery and she will be discharged on this with a plan to stop it and resume her previous home xarelto in one week\", she reports to me she was taking both and today was supposed to be the last day she was taking both.   Monitor hgb and transfuse for hgb <7  Pain control    Thrombus- (present on admission)  Assessment & Plan  IVC filter placed September 26.  She takes Xarelto as outpatient, was discharged on a week of Lovenox to then resume her Xarelto, she unfortunately was taking both her Xarelto and Lovenox causing a retroperitoneal hematoma.  Held anticoagulation " on admission given retroperitoneal hematoma, can restart if her hemoglobin/vitals remains stable and no concerns of ongoing bleeding.    Lumbar stenosis with neurogenic claudication- (present on admission)  Assessment & Plan  Recently underwent lumbar fusion with improvement of her back pain.    GERD (gastroesophageal reflux disease)- (present on admission)  Assessment & Plan  Continue PPI    Hyperlipidemia- (present on admission)  Assessment & Plan  Continue statin        VTE prophylaxis: SCDs/TEDs

## 2022-10-04 NOTE — CARE PLAN
The patient is Stable - Low risk of patient condition declining or worsening    Shift Goals  Clinical Goals: pain control  Patient Goals: pain management  Family Goals: Not present    Progress made toward(s) clinical / shift goals:    Problem: Pain - Standard  Goal: Alleviation of pain or a reduction in pain to the patient’s comfort goal  Outcome: Progressing     Problem: Skin Integrity  Goal: Skin integrity is maintained or improved  Outcome: Progressing

## 2022-10-04 NOTE — PROGRESS NOTES
Family and patient requesting for the provider to reach out to the surgeon who did her back surgery.

## 2022-10-04 NOTE — PROGRESS NOTES
Given Tylenol at 10am. Pain was not relieved. Right leg,back,groin pain. When sleeping sometimes desats to 80s. The dilaudid 0.5 she got one time previous shift helped her pain - she was concerned if dilaudid was too strong for her but Tylenol did nothing for her pain, repositioning hurts as well.    1152 Communicated with PT, they are currently unable to see pt today due to patient load. They will see patient tomorrow.

## 2022-10-04 NOTE — DISCHARGE PLANNING
Received Choice Form @: [id number]  Agency/ Facility Name: [facility]  Referral Sent per Choice Form @: [id number]

## 2022-10-04 NOTE — ASSESSMENT & PLAN NOTE
IVC filter placed September 26  Held anticoagulation on admission given retroperitoneal hematoma  Restarted xarelto, will monitor for additional bleeding

## 2022-10-04 NOTE — CARE PLAN
The patient is Stable - Low risk of patient condition declining or worsening    Shift Goals  Clinical Goals: pain control  Patient Goals: pain management  Family Goals: Not present    Progress made toward(s) clinical / shift goals:    Problem: Pain - Standard  Goal: Alleviation of pain or a reduction in pain to the patient’s comfort goal  Outcome: Progressing     Problem: Knowledge Deficit - Standard  Goal: Patient and family/care givers will demonstrate understanding of plan of care, disease process/condition, diagnostic tests and medications  Outcome: Progressing     Problem: Skin Integrity  Goal: Skin integrity is maintained or improved  Outcome: Progressing       Patient is not progressing towards the following goals: n/a

## 2022-10-04 NOTE — ASSESSMENT & PLAN NOTE
-CTA abdomen pelvis with large right retroperitoneal hematoma without evidence of active arterial extravasation.  -Hgb down to 8.1 from 10.3, will continue to monitor closely and transfuse for hgb <7  -Anticoagulation was held, Discussed with IR, they do not recommend any evacuation-most likely the hematoma is tamponading the bleeding, any intervention could lead to additional bleeding or possible infection  -Will restart xarelto and monitor closely for recurrent bleeding  -Hemoglobin continues to slowly trend down, will plan to check hemoglobin in the morning to ensure it is stable  -Pain control  -Given increased pain will order PT/OT to reevaluate discharge needs, plan for dc to SNF

## 2022-10-04 NOTE — PROGRESS NOTES
Photo taken of back surgery incision with sutures. Appears to have dried yellow discharge on incision. Denies pain at the site. Complains of pain at right flank site; pt has retroperitoneal hematoma per imaging.

## 2022-10-04 NOTE — ASSESSMENT & PLAN NOTE
Recently underwent lumbar fusion with improvement of her back pain.  PA from [facility] states that patient is improving as anticipated, recommends removal of sutures on Saturday 10/8/2022

## 2022-10-04 NOTE — HOSPITAL COURSE
Marry Mathur is a 87 y.o. 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 fib, rheumatoid arthritis, hyperlipidemia, protein S deficiency, GERD, chronic back pain, who presented 10/3/2022 with groin pain.     Patient underwent a a recent L3-L4-L5 L5 decompressive laminectomy and foraminotomies.  Following that she was admitted for acute leg swelling and at that time was found to have a partially occlusive left femoral DVT at which point an IVC filter was placed.  Patient had stopped her chronic anticoagulation for surgery, upon discovery of new DVT she was cleared to discharge home with 40 mg of Lovenox and then transition to her Xarelto.  Upon discharge patient was actually taking her Lovenox and Xarelto simultaneously.  Then on the evening of 10/4/2022 she states that she noted severe increased back, groin and right leg pain.     The emergency department ultrasound was negative for hematoma however given patient's continued significant pain a CT scan was completed.  CT scan revealed a large retroperitoneal hematoma which appears grossly stable.  Patient's anticoagulation therapy was held and she was admitted for closer monitoring.

## 2022-10-04 NOTE — PROGRESS NOTES
[facility] Medicine Daily Progress Note    Date of Service  10/4/2022    Chief Complaint  Marry Mathur is a 87 y.o. female admitted 10/3/2022 with new acute groin pain.    Hospital Course  Marry Mathur is a 87 y.o. 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 fib, rheumatoid arthritis, hyperlipidemia, protein S deficiency, GERD, chronic back pain, who presented 10/3/2022 with groin pain.     Patient underwent a a recent L3-L4-L5 L5 decompressive laminectomy and foraminotomies.  Following that she was admitted for acute leg swelling and at that time was found to have a partially occlusive left femoral DVT at which point an IVC filter was placed.  Patient had stopped her chronic anticoagulation for surgery, upon discovery of new DVT she was cleared to discharge home with 40 mg of Lovenox and then transition to her Xarelto.  Upon discharge patient was actually taking her Lovenox and Xarelto simultaneously.  Then on the evening of 10/4/2022 she states that she noted severe increased back, groin and right leg pain.     The emergency department ultrasound was negative for hematoma however given patient's continued significant pain a CT scan was completed.  CT scan revealed a large retroperitoneal hematoma which appears grossly stable.  Patient's anticoagulation therapy was held and she was admitted for closer monitoring.    Interval Problem Update  Patient is sitting up in bed, does not appear to be in acute distress.  Her grandson is at the bedside.  Patient reports that she has having persistent low back pain, right leg tingling and pain and groin pain.  Pain is stable from previous night with no changes.  -Hemoglobin has dropped from 10.3-8.1, will continue to monitor closely and transfuse if hemoglobin drops below 7  -Placed order for IR consultation for possible drainage of hematoma  -Discussed plan to  restart blood thinner once patient's hemoglobin is stable and any intervention has been completed  -We will order PT/OT for reevaluation, patient is reporting increased pain and difficulty ambulating is concerned about discharge with home health    I have discussed this patient's plan of care and discharge plan at IDT rounds today with Case Management, Nursing, Nursing leadership, and other members of the IDT team.    Consultants/Specialty  Interventional radiology    Code Status  Full Code    Disposition  Patient is not medically cleared for discharge.   Anticipate discharge to to skilled nursing facility.  I have placed the appropriate orders for post-discharge needs.    Review of Systems  Review of Systems   Constitutional:  Negative for chills and fever.   HENT:  Negative for congestion and nosebleeds.    Eyes:  Negative for photophobia and pain.   Respiratory:  Negative for cough and shortness of breath.    Cardiovascular:  Negative for chest pain and palpitations.   Gastrointestinal:  Negative for abdominal pain, diarrhea, nausea and vomiting.   Genitourinary:  Negative for dysuria, flank pain, hematuria and urgency.   Musculoskeletal:  Positive for back pain and joint pain. Negative for myalgias.   Neurological:  Negative for sensory change, speech change and focal weakness.   Psychiatric/Behavioral:  Negative for substance abuse. The patient is not nervous/anxious.       Physical Exam  Temp:  [36.6 °C (97.8 °F)-37.3 °C (99.1 °F)] 36.8 °C (98.2 °F)  Pulse:  [61-87] 82  Resp:  [16-18] 17  BP: (129-155)/(61-69) 139/61  SpO2:  [93 %-96 %] 94 %    Physical Exam  Vitals and nursing note reviewed.   Constitutional:       General: She is not in acute distress.     Appearance: She is not ill-appearing.   HENT:      Head: Normocephalic.   Eyes:      Pupils: Pupils are equal, round, and reactive to light.   Cardiovascular:      Rate and Rhythm: Normal rate and regular rhythm.      Pulses: Normal pulses.      Heart  sounds: Murmur heard.   Systolic murmur is present.   Pulmonary:      Effort: Pulmonary effort is normal.      Breath sounds: Normal breath sounds. No decreased air movement. No wheezing.   Abdominal:      General: Bowel sounds are normal.      Palpations: Abdomen is soft.      Tenderness: There is no abdominal tenderness.   Musculoskeletal:      Right lower leg: 3+ Edema present.      Left lower leg: 3+ Edema present.   Skin:     General: Skin is warm.          Neurological:      General: No focal deficit present.      Mental Status: She is alert and oriented to person, place, and time.      Cranial Nerves: No cranial nerve deficit.   Psychiatric:         Behavior: Behavior is cooperative.       Fluids    Intake/Output Summary (Last 24 hours) at 10/4/2022 1106  Last data filed at 10/4/2022 0600  Gross per 24 hour   Intake --   Output 450 ml   Net -450 ml       Laboratory  Recent Labs     10/03/22  0757 10/03/22  1546 10/04/22  0640   WBC 10.8 10.5 8.7   RBC 3.31* 2.92* 2.61*   HEMOGLOBIN 10.3* 9.0* 8.1*   HEMATOCRIT 31.8* 28.0* 25.2*   MCV 96.1 95.9 96.6   MCH 31.1 30.8 31.0   MCHC 32.4* 32.1* 32.1*   RDW 45.7 46.5 47.1   PLATELETCT 328 326 312   MPV 10.5 9.8 9.4     Recent Labs     10/03/22  0757 10/04/22  0640   SODIUM 132* 134*   POTASSIUM 4.4 4.7   CHLORIDE 98 101   CO2 24 24   GLUCOSE 156* 134*   BUN 22 21   CREATININE 0.54 0.45*   CALCIUM 8.1* 7.5*     Recent Labs     10/03/22  0757   INR 1.71*               Imaging  CTA ABDOMEN PELVIS W & W/O POST PROCESS   Final Result      1.  Large RIGHT retroperitoneal hematoma without evidence of active arterial extravasation   2.  No evidence of aortic dissection or hemodynamically significant stenosis   3.  LEFT lower quadrant chronic fat necrosis redemonstrated. This is not demonstrably changed.   4.  Prior cholecystectomy with mild biliary dilatation which could be compensatory   5.  Trace RIGHT pleural effusion      Findings were communicated with and acknowledged  by LULA ROJO via [employer] on 10/3/2022 11:21 AM.      US-ABDOMEN LTD (SOFT TISSUE)   Final Result      No evidence of RIGHT groin mass or fluid collection      IR-CONSULT AND TREAT    (Results Pending)        Assessment/Plan  * Retroperitoneal hematoma- (present on admission)  Assessment & Plan  -CTA abdomen pelvis with large right retroperitoneal hematoma without evidence of active arterial extravasation.  -Hgb down to 8.1 from 10.3, will continue to monitor closely and transfuse for hgb <7  -Anticoagulation was held, will continue to hold for possible IR intervention  -Once stable will plan to restart xarelto and monitor closely for recurrent bleeding  -Pain control  -Given increased pain will order PT/OT to reevaluate discharge needs    Thrombus- (present on admission)  Assessment & Plan  IVC filter placed September 26  Held anticoagulation on admission given retroperitoneal hematoma  Will plan to restart xarelto once stable     Lumbar stenosis with neurogenic claudication- (present on admission)  Assessment & Plan  Recently underwent lumbar fusion with improvement of her back pain.    GERD (gastroesophageal reflux disease)- (present on admission)  Assessment & Plan  Continue PPI    Hyperlipidemia- (present on admission)  Assessment & Plan  Continue statin         VTE prophylaxis: pharmacologic prophylaxis contraindicated due to bleeding    I have performed a physical exam and reviewed and updated ROS and Plan today (10/4/2022). In review of yesterday's note (10/3/2022), there are no changes except as documented above.

## 2022-10-05 PROBLEM — D62 ACUTE ON CHRONIC BLOOD LOSS ANEMIA: Status: ACTIVE | Noted: 2022-10-05

## 2022-10-05 LAB
ANION GAP SERPL CALC-SCNC: 8 MMOL/L (ref 7–16)
BASOPHILS # BLD AUTO: 0.6 % (ref 0–1.8)
BASOPHILS # BLD: 0.05 K/UL (ref 0–0.12)
BUN SERPL-MCNC: 16 MG/DL (ref 8–22)
CALCIUM SERPL-MCNC: 7.7 MG/DL (ref 8.5–10.5)
CHLORIDE SERPL-SCNC: 102 MMOL/L (ref 96–112)
CO2 SERPL-SCNC: 26 MMOL/L (ref 20–33)
CREAT SERPL-MCNC: 0.39 MG/DL (ref 0.5–1.4)
EOSINOPHIL # BLD AUTO: 0.2 K/UL (ref 0–0.51)
EOSINOPHIL NFR BLD: 2.5 % (ref 0–6.9)
ERYTHROCYTE [DISTWIDTH] IN BLOOD BY AUTOMATED COUNT: 47.2 FL (ref 35.9–50)
GFR SERPLBLD CREATININE-BSD FMLA CKD-EPI: 97 ML/MIN/1.73 M 2
GLUCOSE SERPL-MCNC: 120 MG/DL (ref 65–99)
HCT VFR BLD AUTO: 23.8 % (ref 37–47)
HGB BLD-MCNC: 7.7 G/DL (ref 12–16)
IMM GRANULOCYTES # BLD AUTO: 0.04 K/UL (ref 0–0.11)
IMM GRANULOCYTES NFR BLD AUTO: 0.5 % (ref 0–0.9)
LYMPHOCYTES # BLD AUTO: 1.08 K/UL (ref 1–4.8)
LYMPHOCYTES NFR BLD: 13.2 % (ref 22–41)
MCH RBC QN AUTO: 31.2 PG (ref 27–33)
MCHC RBC AUTO-ENTMCNC: 32.4 G/DL (ref 33.6–35)
MCV RBC AUTO: 96.4 FL (ref 81.4–97.8)
MONOCYTES # BLD AUTO: 0.75 K/UL (ref 0–0.85)
MONOCYTES NFR BLD AUTO: 9.2 % (ref 0–13.4)
NEUTROPHILS # BLD AUTO: 6.04 K/UL (ref 2–7.15)
NEUTROPHILS NFR BLD: 74 % (ref 44–72)
NRBC # BLD AUTO: 0 K/UL
NRBC BLD-RTO: 0 /100 WBC
PLATELET # BLD AUTO: 301 K/UL (ref 164–446)
PMV BLD AUTO: 9.3 FL (ref 9–12.9)
POTASSIUM SERPL-SCNC: 4.5 MMOL/L (ref 3.6–5.5)
RBC # BLD AUTO: 2.47 M/UL (ref 4.2–5.4)
SODIUM SERPL-SCNC: 136 MMOL/L (ref 135–145)
WBC # BLD AUTO: 8.2 K/UL (ref 4.8–10.8)

## 2022-10-05 PROCEDURE — 80048 BASIC METABOLIC PNL TOTAL CA: CPT

## 2022-10-05 PROCEDURE — 97162 PT EVAL MOD COMPLEX 30 MIN: CPT

## 2022-10-05 PROCEDURE — 85025 COMPLETE CBC W/AUTO DIFF WBC: CPT

## 2022-10-05 PROCEDURE — 99232 SBSQ HOSP IP/OBS MODERATE 35: CPT | Performed by: NURSE PRACTITIONER

## 2022-10-05 PROCEDURE — A9270 NON-COVERED ITEM OR SERVICE: HCPCS | Performed by: STUDENT IN AN ORGANIZED HEALTH CARE EDUCATION/TRAINING PROGRAM

## 2022-10-05 PROCEDURE — 770001 HCHG ROOM/CARE - MED/SURG/GYN PRIV*

## 2022-10-05 PROCEDURE — 700102 HCHG RX REV CODE 250 W/ 637 OVERRIDE(OP): Performed by: NURSE PRACTITIONER

## 2022-10-05 PROCEDURE — A9270 NON-COVERED ITEM OR SERVICE: HCPCS | Performed by: NURSE PRACTITIONER

## 2022-10-05 PROCEDURE — 97165 OT EVAL LOW COMPLEX 30 MIN: CPT

## 2022-10-05 PROCEDURE — 700102 HCHG RX REV CODE 250 W/ 637 OVERRIDE(OP): Performed by: STUDENT IN AN ORGANIZED HEALTH CARE EDUCATION/TRAINING PROGRAM

## 2022-10-05 PROCEDURE — 99024 POSTOP FOLLOW-UP VISIT: CPT | Performed by: PHYSICIAN ASSISTANT

## 2022-10-05 RX ORDER — GABAPENTIN 300 MG/1
300 CAPSULE ORAL DAILY
Status: DISCONTINUED | OUTPATIENT
Start: 2022-10-05 | End: 2022-10-06 | Stop reason: HOSPADM

## 2022-10-05 RX ADMIN — Medication 5 MG: at 21:11

## 2022-10-05 RX ADMIN — GABAPENTIN 300 MG: 300 CAPSULE ORAL at 08:57

## 2022-10-05 RX ADMIN — TRAMADOL HYDROCHLORIDE 50 MG: 50 TABLET, COATED ORAL at 13:17

## 2022-10-05 RX ADMIN — RIVAROXABAN 20 MG: 20 TABLET, FILM COATED ORAL at 19:03

## 2022-10-05 RX ADMIN — ATORVASTATIN CALCIUM 10 MG: 10 TABLET, FILM COATED ORAL at 19:03

## 2022-10-05 RX ADMIN — GABAPENTIN 600 MG: 300 CAPSULE ORAL at 21:11

## 2022-10-05 RX ADMIN — OMEPRAZOLE 20 MG: 20 CAPSULE, DELAYED RELEASE ORAL at 05:23

## 2022-10-05 RX ADMIN — SENNOSIDES AND DOCUSATE SODIUM 2 TABLET: 50; 8.6 TABLET ORAL at 05:23

## 2022-10-05 RX ADMIN — SENNOSIDES AND DOCUSATE SODIUM 2 TABLET: 50; 8.6 TABLET ORAL at 19:03

## 2022-10-05 RX ADMIN — TRAMADOL HYDROCHLORIDE 50 MG: 50 TABLET, COATED ORAL at 05:23

## 2022-10-05 RX ADMIN — TRAMADOL HYDROCHLORIDE 50 MG: 50 TABLET, COATED ORAL at 21:11

## 2022-10-05 ASSESSMENT — COGNITIVE AND FUNCTIONAL STATUS - GENERAL
SUGGESTED CMS G CODE MODIFIER DAILY ACTIVITY: CJ
HELP NEEDED FOR BATHING: A LITTLE
DRESSING REGULAR LOWER BODY CLOTHING: A LITTLE
SUGGESTED CMS G CODE MODIFIER MOBILITY: CK
MOVING TO AND FROM BED TO CHAIR: A LOT
WALKING IN HOSPITAL ROOM: A LITTLE
MOBILITY SCORE: 15
TURNING FROM BACK TO SIDE WHILE IN FLAT BAD: A LOT
MOVING FROM LYING ON BACK TO SITTING ON SIDE OF FLAT BED: A LOT
DAILY ACTIVITIY SCORE: 21
STANDING UP FROM CHAIR USING ARMS: A LITTLE
CLIMB 3 TO 5 STEPS WITH RAILING: A LITTLE
TOILETING: A LITTLE

## 2022-10-05 ASSESSMENT — ACTIVITIES OF DAILY LIVING (ADL): TOILETING: INDEPENDENT

## 2022-10-05 ASSESSMENT — LIFESTYLE VARIABLES: SUBSTANCE_ABUSE: 0

## 2022-10-05 ASSESSMENT — ENCOUNTER SYMPTOMS
FOCAL WEAKNESS: 0
NAUSEA: 0
ABDOMINAL PAIN: 0
VOMITING: 0
BACK PAIN: 1
NERVOUS/ANXIOUS: 0
FEVER: 0
CHILLS: 0
SPEECH CHANGE: 0
MYALGIAS: 0
COUGH: 0
EYE PAIN: 0
DIARRHEA: 0
PALPITATIONS: 0
PHOTOPHOBIA: 0
FLANK PAIN: 0
SENSORY CHANGE: 0
SHORTNESS OF BREATH: 0

## 2022-10-05 ASSESSMENT — GAIT ASSESSMENTS
DEVIATION: SHUFFLED GAIT;BRADYKINETIC;DECREASED BASE OF SUPPORT
GAIT LEVEL OF ASSIST: CONTACT GUARD ASSIST
DISTANCE (FEET): 40
ASSISTIVE DEVICE: FRONT WHEEL WALKER

## 2022-10-05 NOTE — PROGRESS NOTES
Assumed care of patient @ 1900.   Assessment completed. POC discussed with pt.   A/El, VSS, 1L NC.   Pt ambulated to chair with FWW and brace, assisted by RN. Call light and belongings within reach. Bed locked and in lowest position.   Needs met at this time.

## 2022-10-05 NOTE — THERAPY
Occupational Therapy   Initial Evaluation     Patient Name: Marry Mathur  Age:  87 y.o., Sex:  female  Medical Record #: [id number]  Today's Date: 10/5/2022     Precautions  Precautions: Lumbosacral Orthosis, Spinal / Back Precautions     Assessment  Patient is an 87 y.o. female who presented to the hospital with pain in right groin. Pt found to have right large retroperitoneal hematoma. Pt with recent decompressive laminectomy and bilateral foraminotomies, chronic DVTs, IVC placement, RA, HLD, GERD and chronic back pain. At baseline, pt lives alone. During OT eval, pt limited by pain, impaired balance, and decreased activity tolerance impacting ADLs and ADL transfers.     Plan    Recommend Occupational Therapy 3 times per week until therapy goals are met for the following treatments:  Adaptive Equipment, Self Care/Activities of Daily Living, Therapeutic Activities, and Therapeutic Exercises.    DC Equipment Recommendations: Unable to determine at this time  Discharge Recommendations: Recommend post-acute placement for additional occupational therapy services prior to discharge home     Subjective    Agreeable to therapy session      Objective       10/05/22 0940   Prior Living Situation   Prior Services   (pays a friend to help her intermittently)   Housing / Facility [address] [facility]   Bathroom Set up Walk In Shower;Shower Chair   Equipment Owned Front-Wheel Walker;Tub / Shower Seat;Grab Bar(s) By Toilet;Bed Side Commode;Hand Held Shower;Reacher   Lives with - Patient's Self Care Capacity Alone and Able to Care For Self   Comments Pt reports having friends on town as well as family who can assist as needed   Prior Level of ADL Function   Self Feeding Independent   Grooming / Hygiene Independent   Bathing Independent   Dressing Requires Assist   Toileting Independent   Comments Her friend has been assisting with donning compression socks   Prior Level of IADL Function   Medication Management Independent    Laundry Requires Assist   Kitchen Mobility Independent   Finances Independent   Home Management Requires Assist   Shopping Requires Assist   Prior Level Of Mobility Independent With Device in Home   Driving / Transportation Driving Independent   Occupation (Pre-Hospital Vocational) Retired Due To Age   Precautions   Precautions Lumbosacral Orthosis;Spinal / Back Precautions    Vitals   O2 Delivery Device Nasal Cannula   Pain 0 - 10 Group   Therapist Pain Assessment During Activity;Nurse Notified  (mild pain)   Cognition    Cognition / Consciousness WDL   Level of Consciousness Alert   Comments pleasant and cooperative, able to verbalize her precautions without cues   Active ROM Upper Body   Active ROM Upper Body  WDL   Strength Upper Body   Upper Body Strength  WDL   Upper Body Muscle Tone   Upper Body Muscle Tone  WDL   Coordination Upper Body   Coordination WDL   Balance Assessment   Sitting Balance (Static) Good   Sitting Balance (Dynamic) Good   Standing Balance (Static) Fair   Standing Balance (Dynamic) Fair -   Weight Shift Sitting Fair   Weight Shift Standing Fair   Comments standing with FWW   Bed Mobility    Comments up in chair before and after session   ADL Assessment   Grooming Standby Assist;Standing   Upper Body Dressing Supervision  (LSO)   Lower Body Dressing Minimal Assist   6 Clicks Daily Activity Score 21   Functional Mobility   Sit to Stand Contact Guard Assist   Bed, Chair, Wheelchair Transfer Contact Guard Assist   Mobility walked in room with FWW   Patient / Family Goals   Patient / Family Goal #1 to get stronger   Short Term Goals   Short Term Goal # 1 Pt will dress LB with supervision and AE   Short Term Goal # 2 Pt will complete toilet transfers with supervision   Education Group   Role of Occupational Therapist Patient Response Patient;Acceptance;Explanation;Verbal Demonstration   Problem List   Problem List Decreased Active Daily Living Skills;Decreased Homemaking Skills;Decreased  Functional Mobility;Decreased Activity Tolerance;Impaired Postural Control / Balance

## 2022-10-05 NOTE — CARE PLAN
The patient is Stable - Low risk of patient condition declining or worsening    Shift Goals  Clinical Goals: IR consult, PT/OT, pain control  Patient Goals: pain control, mobility  Family Goals: Not present    Progress made toward(s) clinical / shift goals:    Problem: Pain - Standard  Goal: Alleviation of pain or a reduction in pain to the patient’s comfort goal  Outcome: Progressing     Problem: Knowledge Deficit - Standard  Goal: Patient and family/care givers will demonstrate understanding of plan of care, disease process/condition, diagnostic tests and medications  Outcome: Progressing     Problem: Skin Integrity  Goal: Skin integrity is maintained or improved  Outcome: Progressing       Patient is not progressing towards the following goals: n/a

## 2022-10-05 NOTE — PROGRESS NOTES
"Neurosurgery  BIBA for severe right hip/groin pain. Found to have large RIGHT retroperitoneal hematoma without evidence of active arterial extravasation.  Pain is improved today. No nerve type pains into the legs. Some groin pain remains, this is improving.  Per RN IR not planning on any further interventions, bleed has likely tamponade and draining may worsen.  Hgb 7.7 today.  SNF vs Rehab in the works      Objective:  BP (!) 149/66   Pulse 74   Temp 36.6 °C (97.9 °F) (Temporal)   Resp 16   Ht 1.575 m (5' 2\")   Wt 77.1 kg (169 lb 15.6 oz)   SpO2 95%     Intake/Output Summary (Last 24 hours) at 10/5/2022 0743  Last data filed at 10/5/2022 0525  Gross per 24 hour   Intake --   Output 300 ml   Net -300 ml       Recent Labs     10/04/22  0640 10/04/22  1358 10/05/22  0532   WBC 8.7 10.1 8.2   RBC 2.61* 2.62* 2.47*   HEMOGLOBIN 8.1* 8.1* 7.7*   HEMATOCRIT 25.2* 25.4* 23.8*   MCV 96.6 96.9 96.4   MCH 31.0 30.9 31.2   MCHC 32.1* 31.9* 32.4*   RDW 47.1 47.3 47.2   PLATELETCT 312 352 301   MPV 9.4 9.4 9.3     Recent Labs     10/03/22  0757 10/04/22  0640 10/05/22  0532   SODIUM 132* 134* 136   POTASSIUM 4.4 4.7 4.5   CHLORIDE 98 101 102   CO2 24 24 26   GLUCOSE 156* 134* 120*   BUN 22 21 16     Recent Labs     10/03/22  0757   INR 1.71*       Surgical incision clean, dry, intact, no evidence of infection  Strength:  Lower extremities are 5/5 grossly  Otherwise neurologically intact    Assessment:  Active Hospital Problems    Diagnosis     GERD (gastroesophageal reflux disease) [K21.9]      Priority: High    Retroperitoneal bleed [R58]     Retroperitoneal hematoma [K66.1]     Thrombus [I82.90]     Lumbar stenosis with neurogenic claudication [M48.062]     Hyperlipidemia [E78.5]      Post op L3-5 lami/fusion on 9/21/2022 with Dr. Bonner  Chemoprophylaxis: No restrictions from NSX perspective    Plan:  1. Ambulate with PT/OT as tolerated - LSO when OOB and active  2. Advance diet as tolerated  3. Sutures/staples out " ideally Saturday  4. No further NSX interventions planned, will follow up as outpatient. Call with any concerns.

## 2022-10-05 NOTE — PROGRESS NOTES
[facility] Medicine Daily Progress Note    Date of Service  10/5/2022    Chief Complaint  Marry Mathur is a 87 y.o. female admitted 10/3/2022 with new acute groin pain.    Hospital Course  Marry Mathur is a 87 y.o. 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 fib, rheumatoid arthritis, hyperlipidemia, protein S deficiency, GERD, chronic back pain, who presented 10/3/2022 with groin pain.     Patient underwent a a recent L3-L4-L5 L5 decompressive laminectomy and foraminotomies.  Following that she was admitted for acute leg swelling and at that time was found to have a partially occlusive left femoral DVT at which point an IVC filter was placed.  Patient had stopped her chronic anticoagulation for surgery, upon discovery of new DVT she was cleared to discharge home with 40 mg of Lovenox and then transition to her Xarelto.  Upon discharge patient was actually taking her Lovenox and Xarelto simultaneously.  Then on the evening of 10/4/2022 she states that she noted severe increased back, groin and right leg pain.     The emergency department ultrasound was negative for hematoma however given patient's continued significant pain a CT scan was completed.  CT scan revealed a large retroperitoneal hematoma which appears grossly stable.  Patient's anticoagulation therapy was held and she was admitted for closer monitoring.    Interval Problem Update  Patient sitting up in a chair, continues to complain of pain in back and leg, appears in no acute distress. Discussed plan of care in depth with patient.  -Hemoglobin has dropped from 10.3-7.7, will continue to monitor closely and transfuse if hemoglobin drops below 7  -Restarting Xarelto  -Surgery PA in to see patient, recommends removal of sutures on Saturday 10-8-2022  -PT/OT following, plan for discharge to SNF    I have discussed this patient's plan of care and  discharge plan at IDT rounds today with Case Management, Nursing, Nursing leadership, and other members of the IDT team.    Consultants/Specialty  Interventional radiology    Code Status  Full Code    Disposition  Patient is not medically cleared for discharge.   Anticipate discharge to to skilled nursing facility.  I have placed the appropriate orders for post-discharge needs.    Review of Systems  Review of Systems   Constitutional:  Negative for chills and fever.   HENT:  Negative for congestion and nosebleeds.    Eyes:  Negative for photophobia and pain.   Respiratory:  Negative for cough and shortness of breath.    Cardiovascular:  Negative for chest pain and palpitations.   Gastrointestinal:  Negative for abdominal pain, diarrhea, nausea and vomiting.   Genitourinary:  Negative for dysuria, flank pain, hematuria and urgency.   Musculoskeletal:  Positive for back pain and joint pain. Negative for myalgias.   Neurological:  Negative for sensory change, speech change and focal weakness.   Psychiatric/Behavioral:  Negative for substance abuse. The patient is not nervous/anxious.       Physical Exam  Temp:  [36.6 °C (97.9 °F)-36.9 °C (98.4 °F)] 36.6 °C (97.9 °F)  Pulse:  [74-89] 74  Resp:  [16-17] 16  BP: (131-149)/(60-66) 149/66  SpO2:  [94 %-98 %] 95 %    Physical Exam  Vitals and nursing note reviewed.   Constitutional:       General: She is not in acute distress.     Appearance: She is not ill-appearing.   HENT:      Head: Normocephalic.   Eyes:      Pupils: Pupils are equal, round, and reactive to light.   Cardiovascular:      Rate and Rhythm: Normal rate and regular rhythm.      Pulses: Normal pulses.      Heart sounds: Murmur heard.   Systolic murmur is present.   Pulmonary:      Effort: Pulmonary effort is normal.      Breath sounds: Normal breath sounds. No decreased air movement. No wheezing.   Abdominal:      General: Bowel sounds are normal.      Palpations: Abdomen is soft.      Tenderness: There is no  abdominal tenderness.   Musculoskeletal:      Right lower leg: 3+ Edema present.      Left lower leg: 3+ Edema present.   Skin:     General: Skin is warm.          Neurological:      General: No focal deficit present.      Mental Status: She is alert and oriented to person, place, and time.      Cranial Nerves: No cranial nerve deficit.   Psychiatric:         Behavior: Behavior is cooperative.       Fluids    Intake/Output Summary (Last 24 hours) at 10/5/2022 1050  Last data filed at 10/5/2022 0525  Gross per 24 hour   Intake --   Output 300 ml   Net -300 ml       Laboratory  Recent Labs     10/04/22  0640 10/04/22  1358 10/05/22  0532   WBC 8.7 10.1 8.2   RBC 2.61* 2.62* 2.47*   HEMOGLOBIN 8.1* 8.1* 7.7*   HEMATOCRIT 25.2* 25.4* 23.8*   MCV 96.6 96.9 96.4   MCH 31.0 30.9 31.2   MCHC 32.1* 31.9* 32.4*   RDW 47.1 47.3 47.2   PLATELETCT 312 352 301   MPV 9.4 9.4 9.3     Recent Labs     10/03/22  0757 10/04/22  0640 10/05/22  0532   SODIUM 132* 134* 136   POTASSIUM 4.4 4.7 4.5   CHLORIDE 98 101 102   CO2 24 24 26   GLUCOSE 156* 134* 120*   BUN 22 21 16   CREATININE 0.54 0.45* 0.39*   CALCIUM 8.1* 7.5* 7.7*     Recent Labs     10/03/22  0757   INR 1.71*               Imaging  CTA ABDOMEN PELVIS W & W/O POST PROCESS   Final Result      1.  Large RIGHT retroperitoneal hematoma without evidence of active arterial extravasation   2.  No evidence of aortic dissection or hemodynamically significant stenosis   3.  LEFT lower quadrant chronic fat necrosis redemonstrated. This is not demonstrably changed.   4.  Prior cholecystectomy with mild biliary dilatation which could be compensatory   5.  Trace RIGHT pleural effusion      Findings were communicated with and acknowledged by LULA ROJO via [employer] on 10/3/2022 11:21 AM.      US-ABDOMEN LTD (SOFT TISSUE)   Final Result      No evidence of RIGHT groin mass or fluid collection           Assessment/Plan  * Retroperitoneal hematoma- (present on  admission)  Assessment & Plan  -CTA abdomen pelvis with large right retroperitoneal hematoma without evidence of active arterial extravasation.  -Hgb down to 8.1 from 10.3, will continue to monitor closely and transfuse for hgb <7  -Anticoagulation was held, Discussed with IR, they do not recommend any evacuation-most likely the hematoma is tamponading the bleeding, any intervention could lead to additional bleeding or possible infection  -Will restart xarelto and monitor closely for recurrent bleeding  -Hemoglobin continues to slowly trend down, will plan to check hemoglobin in the morning to ensure it is stable  -Pain control  -Given increased pain will order PT/OT to reevaluate discharge needs, plan for dc to SNF    Acute blood loss anemia  Assessment & Plan  Hemoglobin slowly trending down, secondary to retroperitoneal bleed  Continue to monitor, transfuse if hgb <7    Thrombus- (present on admission)  Assessment & Plan  IVC filter placed September 26  Held anticoagulation on admission given retroperitoneal hematoma  Restarted xarelto, will monitor for additional bleeding    Lumbar stenosis with neurogenic claudication- (present on admission)  Assessment & Plan  Recently underwent lumbar fusion with improvement of her back pain.  PA from [facility] states that patient is improving as anticipated, recommends removal of sutures on Saturday 10/8/2022    AS (aortic stenosis)  Assessment & Plan  Stable  Outpatient follow up for possible TAVR    GERD (gastroesophageal reflux disease)- (present on admission)  Assessment & Plan  Continue PPI    Hyperlipidemia- (present on admission)  Assessment & Plan  Continue statin       VTE prophylaxis: On therapeutic anticoagulation with xarelto    I have performed a physical exam and reviewed and updated ROS and Plan today (10/5/2022). In review of yesterday's note (10/4/2022), there are no changes except as documented above.

## 2022-10-05 NOTE — DISCHARGE PLANNING
Received Choice Form @: 1552 10/3/22  Agency/ Facility Name: [facility] (Resumption of Care)  Referral Sent per Choice Form @: [id number]    Agency/Facility Name: [facility]  Spoke To: [facility]  Outcome: ROSE spoke with [facility] about HH referral sent over. [facility] will decline services as of right now since Pt will most likely go to a SNF after d/c but will follow her status   ROSE spoke with RN CN and found the Pt was admitted after observation status     RN JUDY notified

## 2022-10-05 NOTE — ASSESSMENT & PLAN NOTE
Hemoglobin slowly trending down, secondary to retroperitoneal bleed  Continue to monitor, transfuse if hgb <7

## 2022-10-05 NOTE — THERAPY
Physical Therapy   Initial Evaluation     Patient Name: Marry Mathur  Age:  87 y.o., Sex:  female  Medical Record #: [id number]  Today's Date: 10/5/2022     Precautions  Precautions: Lumbosacral Orthosis;Spinal / Back Precautions     Assessment  Patient is 87 y.o. female admitted with new acute groin pain found to have a large retroperitoneal hematoma. 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 This is pts 2nd readmission since her spine sx after having to return with a L femoral DVT, now s/p IVC filter. Pt has had a drop in hemoglobin since admission from 10.3-7.7 and was most limited by general fatigue and SOB. PT will cont while pt is in acute care setting to address pain, strength, activity tolerance, balance, and mobility.     Plan    Recommend Physical Therapy 3 times per week until therapy goals are met for the following treatments:  Bed Mobility, Gait Training, Neuro Re-Education / Balance, Self Care/Home Evaluation, Therapeutic Activities, and Therapeutic Exercises    DC Equipment Recommendations: Unable to determine at this time  Discharge Recommendations: Recommend post-acute placement for additional physical therapy services prior to discharge home          10/05/22 0915   Prior Living Situation   Prior Services Intermittent Physical Support for ADL Per Service   Housing / Facility 1 Story House   Steps Into Home 0   Steps In Home 0   Bathroom Set up Walk In Shower;Shower Chair   Equipment Owned Front-Wheel Walker;Tub / Shower Seat;Grab Bar(s) By Toilet;Bed Side Commode;Reacher   Lives with - Patient's Self Care Capacity Alone and Able to Care For Self   Comments pt has some friends and family who can assist. SHe also mentioned a caregiver that comes during the day to help some   Prior Level of Functional Mobility   Bed Mobility Independent   Transfer Status Independent   Ambulation Independent    Distance Ambulation (Feet)   (community)   Assistive Devices Used None   Stairs Independent   Comments independent prior with mobility   Cognition    Level of Consciousness Alert   Comments cooperative   Active ROM Lower Body    Active ROM Lower Body  WDL   Strength Lower Body   Lower Body Strength  WDL   Comments functional but weak   Balance Assessment   Sitting Balance (Static) Fair +   Sitting Balance (Dynamic) Fair +   Standing Balance (Static) Fair   Standing Balance (Dynamic) Fair -   Weight Shift Sitting Good   Weight Shift Standing Fair   Comments FWW   Gait Analysis   Gait Level Of Assist Contact Guard Assist   Assistive Device Front Wheel Walker   Distance (Feet) 40   # of Times Distance was Traveled 1   Deviation Shuffled Gait;Bradykinetic;Decreased Base Of Support   Weight Bearing Status no restrictions   Comments limited by fatigue and some SOB   Bed Mobility    Comments in chair pre and post   Functional Mobility   Sit to Stand Contact Guard Assist   Bed, Chair, Wheelchair Transfer Contact Guard Assist   Transfer Method Stand Step   Mobility in room and hallway with FWW   Patient / Family Goals    Patient / Family Goal #1 stay out of hospital   Short Term Goals    Short Term Goal # 1 pt will be able to complete supine<>sitting from flat bed with SPV in 6tx in order to dc home   Short Term Goal # 2 pt will be able to complete functional transfers with FWW and SPV in 6tx in order to dc home   Short Term Goal # 3 pt will be able to ambulate 150ft with FWW and SPV in 6tx in order to dc home   Education Group   Education Provided Role of Physical Therapist   Role of Physical Therapist Patient Response Patient;Acceptance;Explanation;Verbal Demonstration   Anticipated Discharge Equipment and Recommendations   DC Equipment Recommendations Unable to determine at this time   Discharge Recommendations Recommend post-acute placement for additional physical therapy services prior to discharge home

## 2022-10-06 VITALS
BODY MASS INDEX: 31.28 KG/M2 | DIASTOLIC BLOOD PRESSURE: 65 MMHG | TEMPERATURE: 98.5 F | OXYGEN SATURATION: 96 % | RESPIRATION RATE: 18 BRPM | SYSTOLIC BLOOD PRESSURE: 145 MMHG | HEART RATE: 80 BPM | WEIGHT: 169.97 LBS | HEIGHT: 62 IN

## 2022-10-06 PROBLEM — R58 RETROPERITONEAL BLEED: Status: RESOLVED | Noted: 2022-10-04 | Resolved: 2022-10-06

## 2022-10-06 LAB
ANION GAP SERPL CALC-SCNC: 9 MMOL/L (ref 7–16)
BASOPHILS # BLD AUTO: 0.5 % (ref 0–1.8)
BASOPHILS # BLD: 0.04 K/UL (ref 0–0.12)
BUN SERPL-MCNC: 17 MG/DL (ref 8–22)
CALCIUM SERPL-MCNC: 7.6 MG/DL (ref 8.5–10.5)
CHLORIDE SERPL-SCNC: 99 MMOL/L (ref 96–112)
CO2 SERPL-SCNC: 25 MMOL/L (ref 20–33)
CREAT SERPL-MCNC: 0.4 MG/DL (ref 0.5–1.4)
EOSINOPHIL # BLD AUTO: 0.26 K/UL (ref 0–0.51)
EOSINOPHIL NFR BLD: 3.3 % (ref 0–6.9)
ERYTHROCYTE [DISTWIDTH] IN BLOOD BY AUTOMATED COUNT: 45.9 FL (ref 35.9–50)
GFR SERPLBLD CREATININE-BSD FMLA CKD-EPI: 96 ML/MIN/1.73 M 2
GLUCOSE SERPL-MCNC: 115 MG/DL (ref 65–99)
HCT VFR BLD AUTO: 23.7 % (ref 37–47)
HGB BLD-MCNC: 7.7 G/DL (ref 12–16)
IMM GRANULOCYTES # BLD AUTO: 0.04 K/UL (ref 0–0.11)
IMM GRANULOCYTES NFR BLD AUTO: 0.5 % (ref 0–0.9)
LYMPHOCYTES # BLD AUTO: 1.15 K/UL (ref 1–4.8)
LYMPHOCYTES NFR BLD: 14.8 % (ref 22–41)
MCH RBC QN AUTO: 30.9 PG (ref 27–33)
MCHC RBC AUTO-ENTMCNC: 32.5 G/DL (ref 33.6–35)
MCV RBC AUTO: 95.2 FL (ref 81.4–97.8)
MONOCYTES # BLD AUTO: 0.76 K/UL (ref 0–0.85)
MONOCYTES NFR BLD AUTO: 9.8 % (ref 0–13.4)
NEUTROPHILS # BLD AUTO: 5.54 K/UL (ref 2–7.15)
NEUTROPHILS NFR BLD: 71.1 % (ref 44–72)
NRBC # BLD AUTO: 0 K/UL
NRBC BLD-RTO: 0 /100 WBC
PLATELET # BLD AUTO: 326 K/UL (ref 164–446)
PMV BLD AUTO: 9.4 FL (ref 9–12.9)
POTASSIUM SERPL-SCNC: 4.6 MMOL/L (ref 3.6–5.5)
RBC # BLD AUTO: 2.49 M/UL (ref 4.2–5.4)
SARS-COV+SARS-COV-2 AG RESP QL IA.RAPID: NOTDETECTED
SODIUM SERPL-SCNC: 133 MMOL/L (ref 135–145)
SPECIMEN SOURCE: NORMAL
WBC # BLD AUTO: 7.8 K/UL (ref 4.8–10.8)

## 2022-10-06 PROCEDURE — 99239 HOSP IP/OBS DSCHRG MGMT >30: CPT | Mod: FS | Performed by: NURSE PRACTITIONER

## 2022-10-06 PROCEDURE — 85025 COMPLETE CBC W/AUTO DIFF WBC: CPT

## 2022-10-06 PROCEDURE — 87426 SARSCOV CORONAVIRUS AG IA: CPT

## 2022-10-06 PROCEDURE — A9270 NON-COVERED ITEM OR SERVICE: HCPCS | Performed by: NURSE PRACTITIONER

## 2022-10-06 PROCEDURE — 700102 HCHG RX REV CODE 250 W/ 637 OVERRIDE(OP): Performed by: NURSE PRACTITIONER

## 2022-10-06 PROCEDURE — 80048 BASIC METABOLIC PNL TOTAL CA: CPT

## 2022-10-06 PROCEDURE — 700102 HCHG RX REV CODE 250 W/ 637 OVERRIDE(OP): Performed by: STUDENT IN AN ORGANIZED HEALTH CARE EDUCATION/TRAINING PROGRAM

## 2022-10-06 PROCEDURE — A9270 NON-COVERED ITEM OR SERVICE: HCPCS | Performed by: STUDENT IN AN ORGANIZED HEALTH CARE EDUCATION/TRAINING PROGRAM

## 2022-10-06 RX ORDER — BISACODYL 10 MG
10 SUPPOSITORY, RECTAL RECTAL
Refills: 0 | Status: SHIPPED
Start: 2022-10-06 | End: 2022-12-15

## 2022-10-06 RX ORDER — TRAMADOL HYDROCHLORIDE 50 MG/1
50 TABLET ORAL EVERY 8 HOURS PRN
Qty: 9 TABLET | Refills: 0 | Status: SHIPPED | OUTPATIENT
Start: 2022-10-06 | End: 2022-10-09

## 2022-10-06 RX ORDER — AMOXICILLIN 250 MG
2 CAPSULE ORAL 2 TIMES DAILY
Qty: 30 TABLET | Refills: 0 | Status: SHIPPED | OUTPATIENT
Start: 2022-10-06 | End: 2022-12-15

## 2022-10-06 RX ORDER — POLYETHYLENE GLYCOL 3350 17 G/17G
17 POWDER, FOR SOLUTION ORAL
Refills: 3 | Status: SHIPPED
Start: 2022-10-06 | End: 2024-03-25

## 2022-10-06 RX ADMIN — OMEPRAZOLE 20 MG: 20 CAPSULE, DELAYED RELEASE ORAL at 05:29

## 2022-10-06 RX ADMIN — TRAMADOL HYDROCHLORIDE 50 MG: 50 TABLET, COATED ORAL at 12:17

## 2022-10-06 RX ADMIN — SENNOSIDES AND DOCUSATE SODIUM 2 TABLET: 50; 8.6 TABLET ORAL at 05:29

## 2022-10-06 RX ADMIN — GABAPENTIN 300 MG: 300 CAPSULE ORAL at 05:29

## 2022-10-06 RX ADMIN — MAGNESIUM HYDROXIDE 30 ML: 400 SUSPENSION ORAL at 05:29

## 2022-10-06 NOTE — PROGRESS NOTES
Attempted to call report to [facility] [phone], no answer, no voicemail.    Gave  report to nurse Poppy.

## 2022-10-06 NOTE — PROGRESS NOTES
Verified with provider regarding removal of all of sutures and staples.  Removed staples and sutures. Steri strips on surgical wounds. Tolerated procedure very well.

## 2022-10-06 NOTE — PATIENT INSTRUCTIONS
Discuss right iliotibial band syndrome and right greater trochanteric bursitis with your PT  
Detail Level: Simple

## 2022-10-06 NOTE — DISCHARGE PLANNING
Agency/Facility Name: [facility]  Outcome: DPA called for bed availability and set up transport time   DPA waiting for call back    [phone]    Agency/Facility Name: [facility]  Spoke To: Chapis  Outcome: DPA received notice that bed is available and [facility] will transport Pt at 1:30. RN CM notified that [facility] does suture removal and to put date of removal I d/c summary.

## 2022-10-06 NOTE — CARE PLAN
The patient is Stable - Low risk of patient condition declining or worsening    Shift Goals  Clinical Goals: pain control, PT/OT, SNF  Patient Goals: mobility, pain control, have BM  Family Goals: Not present    Progress made toward(s) clinical / shift goals:    Problem: Pain - Standard  Goal: Alleviation of pain or a reduction in pain to the patient’s comfort goal  Outcome: Progressing     Problem: Knowledge Deficit - Standard  Goal: Patient and family/care givers will demonstrate understanding of plan of care, disease process/condition, diagnostic tests and medications  Outcome: Progressing     Problem: Skin Integrity  Goal: Skin integrity is maintained or improved  Outcome: Progressing       Patient is not progressing towards the following goals: n/a

## 2022-10-06 NOTE — PROGRESS NOTES
Assumed care of patient @ 1900.   Assessment completed. POC discussed with pt and pt's son.   A/Ox4, VSS, 1L NC.   Pt ambulated in hallway assisted by staff with FWW and back brace. Up to chair this evening.    Pt resting in bed with call light and belongings within reach. Bed locked and in lowest position.   Needs met at this time.

## 2022-10-06 NOTE — DISCHARGE SUMMARY
Discharge Summary    CHIEF COMPLAINT ON ADMISSION  Chief Complaint   Patient presents with    Groin Pain     Pt states she recently had a surgery on her back and was diagnosed with a DVT post operatively in the left leg. Last night pt began having similar pain in the right groin and is concerned she may have another DVT.        Reason for Admission  Groin/back pain    Admission Date  10/3/2022     CODE STATUS  Full Code    HPI & HOSPITAL COURSE  Marry Mathur is a 87 y.o. 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 fib, rheumatoid arthritis, hyperlipidemia, protein S deficiency, GERD, chronic back pain, who presented 10/3/2022 with groin pain.     Patient underwent a a recent L3-L4-L5 L5 decompressive laminectomy and foraminotomies.  Following that she was admitted for acute leg swelling and at that time was found to have a partially occlusive left femoral DVT at which point an IVC filter was placed.  Patient had stopped her chronic anticoagulation for surgery, upon discovery of new DVT she was cleared to discharge home with 40 mg of Lovenox and then transition to her Xarelto.  Upon discharge patient was actually taking her Lovenox and Xarelto simultaneously.  Then on the evening of 10/4/2022 she states that she noted severe increased back, groin and right leg pain.     The emergency department ultrasound was negative for hematoma however given patient's continued significant pain a CT scan was completed.  CT scan revealed a large retroperitoneal hematoma which appears grossly stable.  Patient's anticoagulation therapy was held and she was admitted for closer monitoring.    Given amount of pain and blood loss from retroperitoneal hemorrhage a referral to interventional radiology was made.  They did not recommend any intervention, stated that most likely the blood is working to tamponade bleeding and is help  preventing any additional bleeding.  Also stated that any intervention could increase risk of infection or worsening hemorrhage.  Patient's hemoglobin did drop from initially 0.3 down to 7.7 however is remained stable, and patient has been restarted on her anticoagulation with Xarelto.  Recommend rechecking CBC in 3 days and again in 10 days.    Given patient's recent back surgery and multiple hospitalizations and complications for DVT and then retroperitoneal bleed patient's mobility and ability to perform ADLs has declined.  Patient assessed by both physical and occupational therapies which recommend postacute therapy, patient to be discharged to skilled nursing for additional therapy.    Sutures to low back from prior surgery removed on 10/6/2022, base of wound is healed well however there is some noted mild erythema and mild purulent discharge, Steri-Strips have been put in place however do recommend that patient is followed by wound therapy while in skilled nursing for ongoing management.    I have performed a physical exam and reviewed and updated ROS and Plan today (10/6/2022). In review of yesterday's note (10/5/2022), there are no changes except as documented above.      All of patient's other chronic conditions have been well managed and maintained with her home medications while inpatient.    Therefore, she is discharged in guarded and stable condition to skilled nursing facility.    The patient met 2-midnight criteria for an inpatient stay at the time of discharge.      FOLLOW UP ITEMS POST DISCHARGE  Follow-up with [facility] on 11/17/2022  Continue ongoing wound management for surgical incision to ensure good healing.  Recheck CBC at 3 and 10 days.      DISCHARGE DIAGNOSES  Principal Problem:    Retroperitoneal hematoma POA: Yes  Active Problems:    Hyperlipidemia (Chronic) POA: Yes    GERD (gastroesophageal reflux disease) POA: Yes    AS (aortic stenosis) POA: Unknown      Overview: Echo  4.2020:       Compared to prior echo 02/28/19, no significant change.      Normal left ventricular size and systolic function.  Left ventricular       ejection fraction is visually estimated to be 65%.      Mild concentric left ventricular hypertrophy.      Grade I diastolic dysfunction.      Mildly dilated left atrium.      Moderate aortic stenosis.  >> pt advised to fu w CARDIO       Mean: 23 mmHg.      Mild mitral regurgitation    Lumbar stenosis with neurogenic claudication POA: Yes    Thrombus POA: Yes    Acute blood loss anemia POA: Unknown  Resolved Problems:    Retroperitoneal bleed POA: Yes      FOLLOW UP  Future Appointments   Date Time Provider Department Center   10/14/2022  1:20 PM Haseeb Serrano M.D. [facility] None   11/17/2022 10:30 AM Yvan Shearer P.A.-C. [facility] [place]   12/16/2022  3:00 PM [place] [facility] [place]   12/29/2022  2:30 PM Deanna Escoto M.D. [facility] None   12/29/2022  3:15 [facility]         MEDICATIONS ON DISCHARGE     Medication List        START taking these medications        Instructions   bisacodyl 10 MG Supp  Commonly known as: DULCOLAX   Insert 1 Suppository into the rectum 1 time a day as needed (if magnesium hydroxide ineffective after 24 hours).  Dose: 10 mg     magnesium hydroxide 400 MG/5ML Susp  Commonly known as: MILK OF MAGNESIA   Take 30 mL by mouth 1 time a day as needed (if polyethylene glycol ineffective after 24 hours).  Dose: 30 mL     polyethylene glycol/lytes 17 g Pack  Commonly known as: MIRALAX   Take 1 Packet by mouth 1 time a day as needed (if sennosides and docusate ineffective after 24 hours).  Dose: 17 g     senna-docusate 8.6-50 MG Tabs  Commonly known as: PERICOLACE or SENOKOT S   Take 2 Tablets by mouth 2 times a day.  Dose: 2 Tablet     traMADol 50 MG Tabs  Commonly known as: Ultram   Take 1 Tablet by mouth every 8 hours as needed for Moderate Pain for up to 3 days.  Dose: 50 mg            CONTINUE taking  these medications        Instructions   acetaminophen 500 MG Tabs  Commonly known as: TYLENOL   Take 1,000 mg by mouth 3 times a day as needed. Indications: Pain  Dose: 1,000 mg     atorvastatin 10 MG Tabs  Commonly known as: LIPITOR   Take 1 Tablet by mouth every evening.  Dose: 10 mg     gabapentin 300 MG Caps  Commonly known as: NEURONTIN   Take 2 Capsules by mouth at bedtime.  Dose: 600 mg     melatonin 5 mg Tabs   Take 10 mg by mouth every evening.  Dose: 10 mg     omeprazole 20 MG delayed-release capsule  Commonly known as: PRILOSEC   Take 1 Capsule by mouth every day.  Dose: 20 mg     tizanidine 2 MG tablet  Commonly known as: ZANAFLEX   Take 1 Tablet by mouth 3 times a day as needed (muscle spasm).  Dose: 2 mg     vitamin D 1000 Unit (25 mcg) Tabs  Commonly known as: cholecalciferol   Take 500 Units by mouth every morning.  Dose: 500 Units     Xarelto 20 MG Tabs tablet  Generic drug: rivaroxaban   Take 20 mg by mouth with dinner.  Dose: 20 mg            STOP taking these medications      enoxaparin 40 MG/0.4ML Sosy inj  Commonly known as: Lovenox              Allergies  Allergies   Allergen Reactions    Wound Dressing Adhesive      Pt says band-aids cause skin reaction/rash if on for more than 2 days  Paper tape OK         DIET  Orders Placed This Encounter   Procedures    Diet Order Diet: Cardiac     Standing Status:   Standing     Number of Occurrences:   1     Order Specific Question:   Diet:     Answer:   Cardiac [6]       ACTIVITY  As tolerated and directed by skilled nursing.  Weight bearing as tolerated    LINES, DRAINS, AND WOUNDS  This is an automated list. Peripheral IVs will be removed prior to discharge.  Peripheral IV 10/03/22 20 G Right Antecubital (Active)   Site Assessment Clean;Dry;Intact 10/06/22 0800   Dressing Type Transparent 10/06/22 0800   Line Status Saline locked 10/06/22 0800   Dressing Status Clean;Dry;Intact 10/06/22 0800   Dressing Intervention N/A 10/06/22 0000   Infiltration  Grading ([facility], [facility]) 0 10/06/22 0800   Phlebitis Scale ([facility]) 0 10/06/22 0800                  Site Assessment Clean;Dry;Intact 10/03/22 1400   Periwound Assessment Clean;Dry;Intact 10/04/22 1000   Margins Attached edges 10/03/22 1400   Closure Sutures 10/03/22 1400   Drainage Amount None 10/03/22 1400   Dressing Options Open to Air 10/03/22 1400   Dressing Status Open to Air 10/06/22 0815       Peripheral IV 10/03/22 20 G Right Antecubital (Active)   Site Assessment Clean;Dry;Intact 10/06/22 0800   Dressing Type Transparent 10/06/22 0800   Line Status Saline locked 10/06/22 0800   Dressing Status Clean;Dry;Intact 10/06/22 0800   Dressing Intervention N/A 10/06/22 0000   Infiltration Grading ([facility]) 0 10/06/22 0800   Phlebitis Scale ([facility]) 0 10/06/22 0800               MENTAL STATUS ON TRANSFER  Stable     CONSULTATIONS  None    PROCEDURES  None    LABORATORY  Lab Results   Component Value Date    SODIUM 133 (L) 10/06/2022    POTASSIUM 4.6 10/06/2022    CHLORIDE 99 10/06/2022    CO2 25 10/06/2022    GLUCOSE 115 (H) 10/06/2022    BUN 17 10/06/2022    CREATININE 0.40 (L) 10/06/2022        Lab Results   Component Value Date    WBC 7.8 10/06/2022    HEMOGLOBIN 7.7 (L) 10/06/2022    HEMATOCRIT 23.7 (L) 10/06/2022    PLATELETCT 326 10/06/2022      CTA ABDOMEN PELVIS W & W/O POST PROCESS   Final Result      1.  Large RIGHT retroperitoneal hematoma without evidence of active arterial extravasation   2.  No evidence of aortic dissection or hemodynamically significant stenosis   3.  LEFT lower quadrant chronic fat necrosis redemonstrated. This is not demonstrably changed.   4.  Prior cholecystectomy with mild biliary dilatation which could be compensatory   5.  Trace RIGHT pleural effusion      Findings were communicated with and acknowledged by LULA ROJO via [employer] on 10/3/2022 11:21 AM.      US-ABDOMEN LTD (SOFT TISSUE)   Final Result      No evidence of RIGHT groin mass or fluid  collection         CTA ABDOMEN PELVIS W & W/O POST PROCESS   Final Result      1.  Large RIGHT retroperitoneal hematoma without evidence of active arterial extravasation   2.  No evidence of aortic dissection or hemodynamically significant stenosis   3.  LEFT lower quadrant chronic fat necrosis redemonstrated. This is not demonstrably changed.   4.  Prior cholecystectomy with mild biliary dilatation which could be compensatory   5.  Trace RIGHT pleural effusion      Findings were communicated with and acknowledged by LULA ROJO via Vo[employer] Me on 10/3/2022 11:21 AM.      US-ABDOMEN LTD (SOFT TISSUE)   Final Result      No evidence of RIGHT groin mass or fluid collection           Total time of the discharge process took 38 minutes.

## 2022-10-06 NOTE — PROGRESS NOTES
Pt discharged to [facility]. Picked up by wheelchair. Grandson will follow transport by car. All belongings to patient. Discharge packet to transport, has tramadol prescription and negative covid test result.

## 2022-10-06 NOTE — DISCHARGE PLANNING
Case Management Discharge Planning    Admission Date: 10/3/2022  GMLOS: 3  ALOS: 2    6-Clicks ADL Score: 21  6-Clicks Mobility Score: 15  PT and/or OT Eval ordered: Yes  Post-acute Referrals Ordered: Yes  Post-acute Choice Obtained: Yes  Has referral(s) been sent to post-acute provider:  Yes      Anticipated Discharge Dispo: Discharge Disposition: D/T to home under A care in anticipation of covered skilled care (06)  Discharge Address: [address] [place] [id number]  Discharge Contact Phone Number: [phone]    DME Needed: No    Action(s) Taken: Updated Provider/Nurse on Discharge Plan, Choice obtained, Referral(s) sent, Acceptance Received, Transport Arranged , Completed PASSR/LOC, and OTHER: Chart reviewed and discharge plan updated. Discussed patients' treatment and discharge plans with medical and nursing teams during IDT rounds.     RN JUDY notified by Attending of patient being medically cleared. RN CM requested DPA follow-up with accepting facilities for bed availability. RN CM notified that [facility] is able to accept today and provide transportation. COBRA form completed for discharge and packet provided to bedside RN for transport.    Escalations Completed: DPA, [facility], Provider and Bedside RN    Medically Clear: Yes    Next Steps: f/u with medical and nursing teams regarding discharge planning needs/barriers and assist as indicated. f/u with patient and family regarding discharge planning needs/barriers and update discharge plan as indicated.     Barriers to Discharge: None    Is the patient up for discharge tomorrow: No, patient is set to discharge today (10/06/2022) to [facility] to provide facility wheelchair transportation at 1330.

## 2022-10-14 ENCOUNTER — HOSPITAL ENCOUNTER (OUTPATIENT)
Facility: MEDICAL CENTER | Age: 87
End: 2022-10-14
Attending: NEUROLOGICAL SURGERY

## 2022-10-14 ENCOUNTER — HOSPITAL ENCOUNTER (OUTPATIENT)
Facility: MEDICAL CENTER | Age: 87
End: 2022-10-14
Attending: NEUROLOGICAL SURGERY
Payer: MEDICARE

## 2022-10-14 LAB
AMBIGUOUS DTTM AMBI4: NORMAL
SIGNIFICANT IND 70042: NORMAL
SITE SITE: NORMAL
SOURCE SOURCE: NORMAL

## 2022-10-14 PROCEDURE — 87070 CULTURE OTHR SPECIMN AEROBIC: CPT

## 2022-10-14 PROCEDURE — 87205 SMEAR GRAM STAIN: CPT

## 2022-10-14 PROCEDURE — 87075 CULTR BACTERIA EXCEPT BLOOD: CPT

## 2022-10-15 LAB
GRAM STN SPEC: NORMAL
SIGNIFICANT IND 70042: NORMAL
SITE SITE: NORMAL
SOURCE SOURCE: NORMAL

## 2022-10-16 LAB
BACTERIA WND AEROBE CULT: NORMAL
GRAM STN SPEC: NORMAL
SIGNIFICANT IND 70042: NORMAL
SITE SITE: NORMAL
SOURCE SOURCE: NORMAL

## 2022-10-17 LAB
BACTERIA SPEC ANAEROBE CULT: NORMAL
SIGNIFICANT IND 70042: NORMAL
SITE SITE: NORMAL
SOURCE SOURCE: NORMAL

## 2022-11-04 ENCOUNTER — HOSPITAL ENCOUNTER (OUTPATIENT)
Facility: MEDICAL CENTER | Age: 87
End: 2022-11-04
Attending: INTERNAL MEDICINE
Payer: MEDICARE

## 2022-11-04 LAB — AMBIGUOUS DTTM AMBI4: NORMAL

## 2022-11-04 PROCEDURE — 84443 ASSAY THYROID STIM HORMONE: CPT

## 2022-11-04 PROCEDURE — 80053 COMPREHEN METABOLIC PANEL: CPT

## 2022-11-05 LAB
ALBUMIN SERPL BCP-MCNC: 4.3 G/DL (ref 3.2–4.9)
ALBUMIN/GLOB SERPL: 1.5 G/DL
ALP SERPL-CCNC: 95 U/L (ref 30–99)
ALT SERPL-CCNC: 12 U/L (ref 2–50)
ANION GAP SERPL CALC-SCNC: 12 MMOL/L (ref 7–16)
AST SERPL-CCNC: 18 U/L (ref 12–45)
BILIRUB SERPL-MCNC: 0.4 MG/DL (ref 0.1–1.5)
BUN SERPL-MCNC: 21 MG/DL (ref 8–22)
CALCIUM SERPL-MCNC: 9 MG/DL (ref 8.5–10.5)
CHLORIDE SERPL-SCNC: 104 MMOL/L (ref 96–112)
CO2 SERPL-SCNC: 23 MMOL/L (ref 20–33)
CREAT SERPL-MCNC: 0.59 MG/DL (ref 0.5–1.4)
GFR SERPLBLD CREATININE-BSD FMLA CKD-EPI: 88 ML/MIN/1.73 M 2
GLOBULIN SER CALC-MCNC: 2.8 G/DL (ref 1.9–3.5)
GLUCOSE SERPL-MCNC: 111 MG/DL (ref 65–99)
POTASSIUM SERPL-SCNC: 4.5 MMOL/L (ref 3.6–5.5)
PROT SERPL-MCNC: 7.1 G/DL (ref 6–8.2)
SODIUM SERPL-SCNC: 139 MMOL/L (ref 135–145)
TSH SERPL DL<=0.005 MIU/L-ACNC: 1.3 UIU/ML (ref 0.38–5.33)

## 2022-11-07 ENCOUNTER — PATIENT MESSAGE (OUTPATIENT)
Dept: HEALTH INFORMATION MANAGEMENT | Facility: OTHER | Age: 87
End: 2022-11-07

## 2022-11-14 ENCOUNTER — APPOINTMENT (RX ONLY)
Dept: URBAN - METROPOLITAN AREA CLINIC 22 | Facility: CLINIC | Age: 87
Setting detail: DERMATOLOGY
End: 2022-11-14

## 2022-11-14 DIAGNOSIS — D22 MELANOCYTIC NEVI: ICD-10-CM

## 2022-11-14 DIAGNOSIS — Z85.828 PERSONAL HISTORY OF OTHER MALIGNANT NEOPLASM OF SKIN: ICD-10-CM

## 2022-11-14 DIAGNOSIS — D18.0 HEMANGIOMA: ICD-10-CM

## 2022-11-14 DIAGNOSIS — Z85.820 PERSONAL HISTORY OF MALIGNANT MELANOMA OF SKIN: ICD-10-CM

## 2022-11-14 DIAGNOSIS — L57.0 ACTINIC KERATOSIS: ICD-10-CM

## 2022-11-14 DIAGNOSIS — Z86.006 PERSONAL HISTORY OF MELANOMA IN-SITU: ICD-10-CM

## 2022-11-14 DIAGNOSIS — L81.4 OTHER MELANIN HYPERPIGMENTATION: ICD-10-CM

## 2022-11-14 DIAGNOSIS — L82.1 OTHER SEBORRHEIC KERATOSIS: ICD-10-CM

## 2022-11-14 PROBLEM — D23.39 OTHER BENIGN NEOPLASM OF SKIN OF OTHER PARTS OF FACE: Status: ACTIVE | Noted: 2022-11-14

## 2022-11-14 PROBLEM — D48.5 NEOPLASM OF UNCERTAIN BEHAVIOR OF SKIN: Status: ACTIVE | Noted: 2022-11-14

## 2022-11-14 PROBLEM — D22.5 MELANOCYTIC NEVI OF TRUNK: Status: ACTIVE | Noted: 2022-11-14

## 2022-11-14 PROBLEM — D18.01 HEMANGIOMA OF SKIN AND SUBCUTANEOUS TISSUE: Status: ACTIVE | Noted: 2022-11-14

## 2022-11-14 PROCEDURE — 11102 TANGNTL BX SKIN SINGLE LES: CPT | Mod: 59

## 2022-11-14 PROCEDURE — 17004 DESTROY PREMAL LESIONS 15/>: CPT

## 2022-11-14 PROCEDURE — ? COUNSELING

## 2022-11-14 PROCEDURE — ? LIQUID NITROGEN

## 2022-11-14 PROCEDURE — 99214 OFFICE O/P EST MOD 30 MIN: CPT | Mod: 25

## 2022-11-14 PROCEDURE — ? BIOPSY BY SHAVE METHOD

## 2022-11-14 ASSESSMENT — LOCATION ZONE DERM
LOCATION ZONE: LEG
LOCATION ZONE: FACE
LOCATION ZONE: NOSE
LOCATION ZONE: ARM
LOCATION ZONE: HAND
LOCATION ZONE: TRUNK
LOCATION ZONE: NECK

## 2022-11-14 ASSESSMENT — LOCATION DETAILED DESCRIPTION DERM
LOCATION DETAILED: RIGHT ANTERIOR THIGH
LOCATION DETAILED: RIGHT DORSAL HAND
LOCATION DETAILED: RIGHT INFERIOR LATERAL NECK
LOCATION DETAILED: RIGHT INFERIOR MEDIAL MALAR CHEEK
LOCATION DETAILED: UPPER STERNUM
LOCATION DETAILED: RIGHT CLAVICULAR SKIN
LOCATION DETAILED: RIGHT DORSAL MIDDLE METACARPOPHALANGEAL JOINT
LOCATION DETAILED: RIGHT UPPER BACK
LOCATION DETAILED: RIGHT CHEEK
LOCATION DETAILED: LEFT MEDIAL MALAR CHEEK
LOCATION DETAILED: LEFT ANTERIOR THIGH
LOCATION DETAILED: RIGHT ULNAR DORSAL HAND
LOCATION DETAILED: NASAL DORSUM
LOCATION DETAILED: RIGHT INFERIOR CENTRAL MALAR CHEEK
LOCATION DETAILED: RIGHT MEDIAL FOREHEAD
LOCATION DETAILED: LEFT DISTAL PRETIBIAL REGION
LOCATION DETAILED: LEFT ULNAR DORSAL HAND
LOCATION DETAILED: RIGHT PROXIMAL DORSAL FOREARM
LOCATION DETAILED: LEFT LATERAL SUPERIOR CHEST
LOCATION DETAILED: LEFT DORSAL HAND
LOCATION DETAILED: RIGHT CENTRAL TEMPLE
LOCATION DETAILED: LEFT UPPER BACK
LOCATION DETAILED: LEFT INFERIOR MEDIAL MALAR CHEEK
LOCATION DETAILED: LEFT PROXIMAL DORSAL FOREARM
LOCATION DETAILED: RIGHT DISTAL DORSAL FOREARM
LOCATION DETAILED: LEFT MID BACK
LOCATION DETAILED: LEFT RADIAL DORSAL HAND
LOCATION DETAILED: LEFT SUPERIOR LATERAL NECK

## 2022-11-14 ASSESSMENT — LOCATION SIMPLE DESCRIPTION DERM
LOCATION SIMPLE: LEFT FOREARM
LOCATION SIMPLE: CHEST
LOCATION SIMPLE: RIGHT HAND
LOCATION SIMPLE: RIGHT FOREARM
LOCATION SIMPLE: LEFT PRETIBIAL REGION
LOCATION SIMPLE: NECK
LOCATION SIMPLE: RIGHT CHEEK
LOCATION SIMPLE: LEFT LOWER EXTREMITY
LOCATION SIMPLE: RIGHT ANTERIOR NECK
LOCATION SIMPLE: RIGHT FOREHEAD
LOCATION SIMPLE: LEFT CHEEK
LOCATION SIMPLE: RIGHT LOWER EXTREMITY
LOCATION SIMPLE: NOSE
LOCATION SIMPLE: BACK
LOCATION SIMPLE: LEFT HAND
LOCATION SIMPLE: RIGHT CLAVICULAR SKIN
LOCATION SIMPLE: RIGHT TEMPLE

## 2022-11-14 NOTE — HPI: MELANOMA F/U (HISTORY OF MALIGNANT MELANOMA)
What Is The Reason For Today's Visit?: History of Melanoma
What Stage Is The Melanoma?: Stage IIB
Breslow Depth?: 1.1
Year Excised?: 2022

## 2022-11-14 NOTE — PROCEDURE: LIQUID NITROGEN
Render Post-Care Instructions In Note?: no
Detail Level: Detailed
Consent: The patient's consent was obtained including but not limited to risks of crusting, scabbing, blistering, scarring, darker or lighter pigmentary change, recurrence, incomplete removal and infection.
Post-Care Instructions: I reviewed with the patient in detail post-care instructions. Patient is to wear sunprotection, and avoid picking at any of the treated lesions. Pt may apply Vaseline to crusted or scabbing areas.
Number Of Freeze-Thaw Cycles: 2 freeze-thaw cycles
Duration Of Freeze Thaw-Cycle (Seconds): 3
Show Aperture Variable?: Yes

## 2022-11-14 NOTE — PROCEDURE: BIOPSY BY SHAVE METHOD
Detail Level: Detailed
Depth Of Biopsy: dermis
Was A Bandage Applied: Yes
Size Of Lesion In Cm: 0.5
X Size Of Lesion In Cm: 0.8
Biopsy Type: H and E
Biopsy Method: Dermablade
Anesthesia Type: 1% lidocaine with epinephrine
Additional Anesthesia Volume In Cc (Will Not Render If 0): 0
Hemostasis: Drysol
Wound Care: Petrolatum
Dressing: bandage
Destruction After The Procedure: No
Type Of Destruction Used: Curettage
Curettage Text: The wound bed was treated with curettage after the biopsy was performed.
Cryotherapy Text: The wound bed was treated with cryotherapy after the biopsy was performed.
Electrodesiccation Text: The wound bed was treated with electrodesiccation after the biopsy was performed.
Electrodesiccation And Curettage Text: The wound bed was treated with electrodesiccation and curettage after the biopsy was performed.
Silver Nitrate Text: The wound bed was treated with silver nitrate after the biopsy was performed.
Lab: [id number]
Lab Facility: 
Consent: Written consent was obtained and risks were reviewed including but not limited to scarring, infection, bleeding, scabbing, incomplete removal, nerve damage and allergy to anesthesia.
Post-Care Instructions: I reviewed with the patient in detail post-care instructions. Patient is to keep the biopsy site dry overnight, and then apply bacitracin twice daily until healed. Patient may apply hydrogen peroxide soaks to remove any crusting.
Notification Instructions: Patient will be notified of biopsy results. However, patient instructed to call the office if not contacted within 2 weeks.
Billing Type: Third-Party Bill
Information: Selecting Yes will display possible errors in your note based on the variables you have selected. This validation is only offered as a suggestion for you. PLEASE NOTE THAT THE VALIDATION TEXT WILL BE REMOVED WHEN YOU FINALIZE YOUR NOTE. IF YOU WANT TO FAX A PRELIMINARY NOTE YOU WILL NEED TO TOGGLE THIS TO 'NO' IF YOU DO NOT WANT IT IN YOUR FAXED NOTE.

## 2022-12-02 ENCOUNTER — TELEPHONE (OUTPATIENT)
Dept: VASCULAR LAB | Facility: MEDICAL CENTER | Age: 87
End: 2022-12-02
Payer: MEDICARE

## 2022-12-02 NOTE — TELEPHONE ENCOUNTER
Chart reviewed in accordance with IVC filter protocol.  Pt had filter placed prior to back surgery however she had a complication of a retroperitoneal hematoma with the IVC filter placement.    She is being closely followed by her spinal surgeon and his PA.  Msg to their office to discuss potential plans for IVC filter disposition.    Hannah Guidry [facility]

## 2022-12-06 ENCOUNTER — HOSPITAL ENCOUNTER (OUTPATIENT)
Facility: MEDICAL CENTER | Age: 87
End: 2022-12-06
Attending: INTERNAL MEDICINE
Payer: MEDICARE

## 2022-12-06 PROCEDURE — 80053 COMPREHEN METABOLIC PANEL: CPT

## 2022-12-06 PROCEDURE — 84443 ASSAY THYROID STIM HORMONE: CPT

## 2022-12-07 LAB
ALBUMIN SERPL BCP-MCNC: 4.5 G/DL (ref 3.2–4.9)
ALBUMIN/GLOB SERPL: 1.8 G/DL
ALP SERPL-CCNC: 78 U/L (ref 30–99)
ALT SERPL-CCNC: 13 U/L (ref 2–50)
ANION GAP SERPL CALC-SCNC: 15 MMOL/L (ref 7–16)
AST SERPL-CCNC: 22 U/L (ref 12–45)
BILIRUB SERPL-MCNC: 0.4 MG/DL (ref 0.1–1.5)
BUN SERPL-MCNC: 23 MG/DL (ref 8–22)
CALCIUM SERPL-MCNC: 9.1 MG/DL (ref 8.5–10.5)
CHLORIDE SERPL-SCNC: 104 MMOL/L (ref 96–112)
CO2 SERPL-SCNC: 21 MMOL/L (ref 20–33)
CREAT SERPL-MCNC: 0.65 MG/DL (ref 0.5–1.4)
GFR SERPLBLD CREATININE-BSD FMLA CKD-EPI: 86 ML/MIN/1.73 M 2
GLOBULIN SER CALC-MCNC: 2.5 G/DL (ref 1.9–3.5)
GLUCOSE SERPL-MCNC: 93 MG/DL (ref 65–99)
POTASSIUM SERPL-SCNC: 4.7 MMOL/L (ref 3.6–5.5)
PROT SERPL-MCNC: 7 G/DL (ref 6–8.2)
SODIUM SERPL-SCNC: 140 MMOL/L (ref 135–145)
TSH SERPL DL<=0.005 MIU/L-ACNC: 2.08 UIU/ML (ref 0.38–5.33)

## 2022-12-15 ENCOUNTER — OFFICE VISIT (OUTPATIENT)
Dept: VASCULAR LAB | Facility: MEDICAL CENTER | Age: 87
End: 2022-12-15
Attending: FAMILY MEDICINE
Payer: MEDICARE

## 2022-12-15 VITALS
HEIGHT: 62 IN | SYSTOLIC BLOOD PRESSURE: 108 MMHG | DIASTOLIC BLOOD PRESSURE: 65 MMHG | RESPIRATION RATE: 14 BRPM | WEIGHT: 155 LBS | HEART RATE: 81 BPM | BODY MASS INDEX: 28.52 KG/M2

## 2022-12-15 DIAGNOSIS — E78.49 OTHER HYPERLIPIDEMIA: ICD-10-CM

## 2022-12-15 DIAGNOSIS — D68.59 PROTEIN S DEFICIENCY (HCC): ICD-10-CM

## 2022-12-15 DIAGNOSIS — Z15.09 LYNCH SYNDROME: ICD-10-CM

## 2022-12-15 DIAGNOSIS — Z95.828 PRESENCE OF IVC FILTER: ICD-10-CM

## 2022-12-15 DIAGNOSIS — D62 ACUTE BLOOD LOSS ANEMIA: ICD-10-CM

## 2022-12-15 DIAGNOSIS — I45.10 RIGHT BUNDLE BRANCH BLOCK: ICD-10-CM

## 2022-12-15 DIAGNOSIS — I82.5Y2 CHRONIC DEEP VEIN THROMBOSIS (DVT) OF PROXIMAL VEIN OF LEFT LOWER EXTREMITY (HCC): ICD-10-CM

## 2022-12-15 DIAGNOSIS — I35.0 NONRHEUMATIC AORTIC VALVE STENOSIS: ICD-10-CM

## 2022-12-15 DIAGNOSIS — Z79.01 CHRONIC ANTICOAGULATION: ICD-10-CM

## 2022-12-15 DIAGNOSIS — M05.79 RHEUMATOID ARTHRITIS INVOLVING MULTIPLE SITES WITH POSITIVE RHEUMATOID FACTOR (HCC): Chronic | ICD-10-CM

## 2022-12-15 DIAGNOSIS — I65.23 CAROTID ATHEROSCLEROSIS, BILATERAL: ICD-10-CM

## 2022-12-15 PROBLEM — D68.69 SECONDARY HYPERCOAGULABLE STATE (HCC): Status: ACTIVE | Noted: 2022-12-15

## 2022-12-15 PROBLEM — D68.69 SECONDARY HYPERCOAGULABLE STATE (HCC): Status: RESOLVED | Noted: 2022-12-15 | Resolved: 2022-12-15

## 2022-12-15 PROCEDURE — 99204 OFFICE O/P NEW MOD 45 MIN: CPT | Performed by: FAMILY MEDICINE

## 2022-12-15 PROCEDURE — 99212 OFFICE O/P EST SF 10 MIN: CPT

## 2022-12-15 ASSESSMENT — ENCOUNTER SYMPTOMS
MYALGIAS: 0
BRUISES/BLEEDS EASILY: 0
WEAKNESS: 0
ORTHOPNEA: 0
FEVER: 0
BLOOD IN STOOL: 0
COUGH: 0
CLAUDICATION: 0
NAUSEA: 0
PALPITATIONS: 0
CHILLS: 0

## 2022-12-15 ASSESSMENT — FIBROSIS 4 INDEX: FIB4 SCORE: 1.61

## 2022-12-15 NOTE — PROGRESS NOTES
Plan to remove IVC filter.  Pt ok to hold Xarelto 48 hrs prior to procedure.  IR sched form scanned into media by TYRON Guidry [facility]

## 2022-12-15 NOTE — PROGRESS NOTES
INITIAL VASCULAR ANTICOAGULATION VISIT  Marry Mathur is a 87 y.o. female who presents today 12/15/22 for   Chief Complaint   Patient presents with    Other     NP Dx: IVC Filter Eval      Initially referred by Yvan Shearer P.ARosalia-CRosalia for length of therapy (LOT) determination and management of anticoagulation in context of acute venothromboembolic disease    Subjective      IVC filter:   Date of placement: 22  Reason for placement: back surgery , surg-assoc LLE DVT, unable to anticoag  Pending surgeries: none      VTE disease / Anticoagulation:   Date of initiation of anticoagulation: >20yrs   Current symptoms:  Denies current SOB, CP, leg swelling, edema, leg pain, cyanosis of digits, redness of extremities.  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 Yes, Details: multiple   Any family VTE hx? No  _____PROVOKING FACTORS:  Recent surgery ? Yes, Details: back surgery   Recent trauma ? No  Smoker?  reports that she has never smoked. She has never used smokeless tobacco.    Extended travel? No  Other periods of immobility? Yes, Details: related to back surg  Other known or potential risk factors for VTE disease:  no  WOMEN: Hx of cancer or abnormal cancer screenings: no       Any estrogen, testosterone HRT, E2 birth control, tamoxifene, raloxifene: no       Hx of recurrent miscarriages: no  Hypercoaguability work-up completed?  Chart reports Protein S deficiency - patient reports unaware of this diagnosis     HTN:  No interval concerns, tolerating meds, no sx.   Home BP los/60s  Adherence to current HTN meds: compliant all of the time     Past  "Medical History:   Diagnosis Date    Adenocarcinoma of colon (HCC) 10/30/2018    From sessile serrated polyp 10/2018    Anesthesia     pt states \"one new dr scraped my esophagus when they put the tube in and I started bleeding so they couldn't operate\"     Atrial fibrillation [I48.91] 04/19/2016     pt denies     Back pain 06/01/2022    0/10    Blood clotting disorder (HCC) 2012    clot in leg    Bowel habit changes     constipation     Cancer (HCC)     skin, colon 2018    Cataract     belia IOL     Chronic back pain greater than 3 months duration     Deep vein thrombosis (HCC) 03/08/2016    First occurrence in LLE in late 1970s Second occurrence further up in LLE in 2012, has been on AC since     Essential hypertension, benign 06/27/2012    GERD (gastroesophageal reflux disease) 06/27/2012    Heart murmur     High cholesterol     Hyperlipidemia     Hypertension     hx of, not currently     Impaired fasting glucose 11/02/2017    Melanoma (HCC)     Mild aortic stenosis     Seeing Dr. Van    Other and unspecified hyperlipidemia 06/27/2012    Prediabetes     Protein S deficiency (HCC) 11/02/2017    Noted in lab work 2013 as a part of work up at [facility]     Rheumatoid arthritis involving multiple sites with positive rheumatoid factor (HCC) 03/14/2016    Dr. Escoto    Rheumatoid nodule ([facility]) 07/25/2017    Right bundle branch block 06/27/2012    pt denies     Urinary incontinence 11/02/2017     Past Surgical History:   Procedure Laterality Date    LUMBAR FUSION O-ARM  9/21/2022    Procedure: L3-4 and L4-5 decompressive laminectomy, bilateral foraminotomies and L3-4-5 posterior lumbar instrumented fusion;  Surgeon: Rosa M Bonner M.D.;  Location: SURGERY [facility];  Service: Neurosurgery    LUMBAR DECOMPRESSION  9/21/2022    Procedure: DECOMPRESSION, SPINE, LUMBAR;  Surgeon: Rosa M Bonner M.D.;  Location: SURGERY [facility];  Service: Neurosurgery    LUMBAR LAMINECTOMY DISKECTOMY  9/21/2022    Procedure: " LAMINECTOMY, SPINE, LUMBAR, WITH DISCECTOMY;  Surgeon: Rosa M Bonner M.D.;  Location: SURGERY [facility];  Service: Neurosurgery    FORAMINOTOMY  9/21/2022    Procedure: FORAMINOTOMY, SPINE;  Surgeon: Rosa M Bonner M.D.;  Location: SURGERY [facility];  Service: Neurosurgery    ME INJECTION,SACROILIAC JOINT Right 7/12/2022    Procedure: RIGHT sacroiliac joint injection with fluoroscopic guidance.;  Surgeon: Indira Lee M.D.;  Location: [facility];  Service: Pain Management    ME REMOVE ARMPITS LYMPH NODES COMPLT Left 6/7/2022    Procedure: LYMPHADENECTOMY, AXILLARY;  Surgeon: Bernardino Torres M.D.;  Location: SURGERY [facility];  Service: General    BLOCK EPIDURAL STEROID INJECTION Right 5/31/2022    Procedure: RIGHT L4-5 and L5-S1 transforaminal epidural steroid injection with fluoroscopic guidance.;  Surgeon: Indira Lee M.D.;  Location: [facility];  Service: Pain Management    BLOCK EPIDURAL STEROID INJECTION Right 05/10/2022    Procedure: Lumbar L4-5 interlaminar epidural steroid injection;  Surgeon: Indira Lee M.D.;  Location: [facility];  Service: Pain Management    WIDE EXCISION, LESION, HEAD AND NECK REGION Left 03/29/2022    Procedure: WIDE EXCISION, LESION, HEAD AND NECK REGION - RADICAL MALIGNANT MELANOMA OF LEFT CHEST;  Surgeon: Bernardino Torres M.D.;  Location: SURGERY [facility];  Service: General    LUMBAR MEDIAL BRANCH BLOCKS Bilateral 12/15/2020    Procedure: BLOCK, NERVE, SPINAL, LUMBAR, POSTERIOR RAMUS, MEDIAL BRANCH;  Surgeon: Chris Cooper M.D.;  Location: [facility]ERY [facility];  Service: Pain Management    IRRIGATION & DEBRIDEMENT ORTHO Bilateral 07/31/2020    Procedure: IRRIGATION AND DEBRIDEMENT, WOUND - KNEE;  Surgeon: Roosevelt Saucedo M.D.;  Location: [facility];  Service: Orthopedics    HEMICOLECTOMY Right 12/13/2018    Procedure: OPEN RIGHT HEMICOLECTOMY;  Surgeon: Dash SCHAEFFER  DINOISIO Bhagat;  Location: SURGERY [facility];  Service: General    INGUINAL HERNIA REPAIR Right 09/23/2016    Procedure: INGUINAL HERNIA REPAIR - PRIMARY;  Surgeon: Mary Medina M.D.;  Location: SURGERY [facility];  Service:     OTHER  08/12/2014    peroneal nerve surgery Dr. Castro     OTHER ORTHOPEDIC SURGERY  2011    meniscus repair    ARTHROPLASTY Left     hip replacement    CHOLECYSTECTOMY      HYSTERECTOMY, TOTAL ABDOMINAL  1970's    KNEE ARTHROPLASTY TOTAL Left     ROTATOR CUFF REPAIR Bilateral        Current Outpatient Medications:     polyethylene glycol/lytes, 17 g, Oral, QDAY PRN, PRN    acetaminophen, 1,000 mg, Oral, TID PRN, PRN    rivaroxaban, 20 mg, Oral, PM MEAL, Taking    tizanidine, 2 mg, Oral, TID PRN, Taking    melatonin, 10 mg, Oral, Nightly, PRN    gabapentin, 600 mg, Oral, QHS, Taking    atorvastatin, 10 mg, Oral, Q EVENING, Taking    omeprazole, 20 mg, Oral, DAILY, Taking    vitamin D, 500 Units, Oral, QAM, Taking     Allergies   Allergen Reactions    Wound Dressing Adhesive      Pt says band-aids cause skin reaction/rash if on for more than 2 days  Paper tape OK         Family History   Problem Relation Age of Onset    Cancer Mother         stomach- ruptured appendix    Cancer Father         colon    Leukemia Father         many exposure, worked for [employer]     Heart EPIS Sister     Heart Disease Brother         s/p stent     Other Son         on blood thinner, unclear etiology    Clotting Disorder Neg Hx      Social History     Tobacco Use    Smoking status: Never    Smokeless tobacco: Never   Vaping Use    Vaping Use: Never used   Substance Use Topics    Alcohol use: Not Currently     Comment: very rare    Drug use: Not Currently       DIET AND EXERCISE:  Weight Change:stable   BMI Readings from Last 5 Encounters:   12/15/22 28.35 kg/m²   10/25/22 30.91 kg/m²   10/14/22 30.91 kg/m²   10/03/22 31.09 kg/m²   09/27/22 30.00 kg/m²     Diet: common adult  Exercise:  "moderate regular exercise program     Review of Systems   Constitutional:  Negative for chills and fever.   HENT:  Negative for nosebleeds.    Respiratory:  Negative for cough.    Cardiovascular:  Negative for chest pain, palpitations, orthopnea, claudication and leg swelling.   Gastrointestinal:  Negative for blood in stool, melena and nausea.   Musculoskeletal:  Negative for myalgias.   Neurological:  Negative for weakness.   Endo/Heme/Allergies:  Does not bruise/bleed easily.         Objective    Vitals:    12/15/22 0753   BP: 108/65   BP Location: Left arm   Patient Position: Sitting   BP Cuff Size: Adult   Pulse: 81   Resp: 14   Weight: 70.3 kg (155 lb)   Height: 1.575 m (5' 2\")      BP Readings from Last 5 Encounters:   12/15/22 108/65   10/06/22 (!) 145/65   09/28/22 119/75   09/24/22 (!) 168/78   09/08/22 (!) 164/120      Body mass index is 28.35 kg/m².  Physical Exam  Vitals reviewed.   Constitutional:       General: She is not in acute distress.     Appearance: Normal appearance.   HENT:      Head: Normocephalic and atraumatic.   Eyes:      General: Lids are normal.      Extraocular Movements: Extraocular movements intact.      Conjunctiva/sclera: Conjunctivae normal.   Neck:      Thyroid: No thyroid mass.      Vascular: Normal carotid pulses. No carotid bruit.      Trachea: Trachea normal.   Cardiovascular:      Rate and Rhythm: Normal rate and regular rhythm.      Chest Wall: PMI is not displaced.      Pulses: Normal pulses.           Carotid pulses are 2+ on the right side and 2+ on the left side.       Radial pulses are 2+ on the right side and 2+ on the left side.        Dorsalis pedis pulses are 2+ on the right side and 2+ on the left side.        Posterior tibial pulses are 2+ on the right side and 2+ on the left side.      Heart sounds: Murmur heard.   Systolic murmur is present with a grade of 4/6.     No S3 or S4 sounds.      Comments: Stemmer's sign - negative bilat   Spider telangectasia:       " RLE:  None      LLE: none   Varicosities:           RLE: none      LLE: none   Corona phlebectatica:      RLE:  None        LLE:  None   Cording:         RLE:  None     LLE: None         Pulmonary:      Effort: Pulmonary effort is normal.      Breath sounds: Normal breath sounds.   Musculoskeletal:      Cervical back: Full passive range of motion without pain and neck supple.      Right lower leg: No edema.      Left lower leg: No edema.   Skin:     General: Skin is warm and dry.      Capillary Refill: Capillary refill takes less than 2 seconds.      Coloration: Skin is not cyanotic.      Nails: There is no clubbing.      Comments: Stasis pigmentation:     RLE:  no     LLE: no   Stasis dermatitis:    RLE: no     LLE: no   Lipodermatosclerosis and/or atrophie norm:     RLE: no    LLE: no   Healed and/or active venous leg ulcers:     RLE: no     LLE: no    Neurological:      General: No focal deficit present.      Mental Status: She is alert and oriented to person, place, and time. Mental status is at baseline.      Coordination: Coordination is intact.      Gait: Gait is intact.   Psychiatric:         Mood and Affect: Mood normal.         Behavior: Behavior normal.       Lab Results   Component Value Date    CHOLSTRLTOT 147 09/28/2021    LDL 78 09/28/2021    HDL 51 09/28/2021    TRIGLYCERIDE 92 09/28/2021      No results found for: LIPOPROTA   No results found for: APOB    Lab Results   Component Value Date    PROTHROMBTM 19.7 (H) 10/03/2022    INR 1.71 (H) 10/03/2022       Lab Results   Component Value Date    HBA1C 5.6 09/16/2022      Lab Results   Component Value Date    SODIUM 140 12/06/2022    POTASSIUM 4.7 12/06/2022    CHLORIDE 104 12/06/2022    CO2 21 12/06/2022    GLUCOSE 93 12/06/2022    BUN 23 (H) 12/06/2022    CREATININE 0.65 12/06/2022    IFAFRICA >60 12/05/2021    IFNOTAFR >60 12/05/2021        Lab Results   Component Value Date    WBC 7.8 10/06/2022    RBC 2.49 (L) 10/06/2022    HEMOGLOBIN 7.7 (L)  10/06/2022    HEMATOCRIT 23.7 (L) 10/06/2022    MCV 95.2 10/06/2022    MCH 30.9 10/06/2022    MCHC 32.5 (L) 10/06/2022    MPV 9.4 10/06/2022       Latest Reference Range & Units 10/04/22 06:40 10/04/22 13:58 10/05/22 05:32 10/06/22 02:35   Hemoglobin 12.0 - 16.0 g/dL 8.1 (L) 8.1 (L) 7.7 (L) 7.7 (L)   Hematocrit 37.0 - 47.0 % 25.2 (L) 25.4 (L) 23.8 (L) 23.7 (L)   (L): Data is abnormally low    VASCULAR IMAGING:     Carotid 2018  1.  There mild to moderate atherosclerotic plaque.  Plaque is located in carotid bulbs and proximal internal carotid arteries.  Plaque characterization:  Primarily calcific   2.  There is no evidence of carotid occlusion.   3. Vertebral arteries demonstrate antegrade flow.   4. Diameter reduction in the internal carotid arteries: less than 50%. There is no hemodynamically significant stenosis.    LLE venous 10/2019   Chronic appearing non-occlusive thrombus within the mid femoral vein.    VANESSA 2020  There is no evidence of major arterial disease demonstrated    RLE venous 4/12/21  . No evidence of right lower extremity deep venous thrombosis.    LLE venous 9/25/22   1) Non-occlusive thrombus in the left mid SFV, likely chronic.  Otherwise    no acute DVT appreciated    IVC filter placement 9/26/22  1. Ultrasound and fluoroscopic guided placement of a Argon Option retrievable caval filter in the infrarenal IVC.  The Argon Option retrievable filter is suitable for removal under usual circumstances to 180 days (6 months) and sometimes up to one year and longer. This filter may also be used as a permanent filter. Please consult Interventional Radiology for filter   removal if indicated.   2. Inferior vena cavagram within normal limits with no evidence of caval thrombus or occlusion.    CTA abd/pelvis 10/3/22  Aorta and vasculature: No dissection. No abdominal aortic aneurysm. No evidence of hemodynamically significant stenosis in the major visceral branches, pelvic arteries or imaged femoral  arteries.   1.  Large RIGHT retroperitoneal hematoma without evidence of active arterial extravasation  2.  No evidence of aortic dissection or hemodynamically significant stenosis  3.  LEFT lower quadrant chronic fat necrosis redemonstrated. This is not demonstrably changed.  4.  Prior cholecystectomy with mild biliary dilatation which could be compensatory  5.  Trace RIGHT pleural effusion          Medical Decision Making:  Today's Assessment / Status / Plan:     1. Chronic anticoagulation        2. Presence of IVC filter        3. 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 Right bundle branch block        6. Rheumatoid arthritis involving multiple sites with positive rheumatoid factor (HCC)        7. Carotid atherosclerosis, bilateral        8. Protein S deficiency (HCC)        9. Acute blood loss anemia  CBC WITHOUT DIFFERENTIAL      10. Other hyperlipidemia        11. Nonrheumatic aortic valve stenosis          PATIENT TYPE: Primary Prevention    Etiology of Established CVD if Present:     1) recurrent VTE disease   - chart reports protein S deficiency however pt reports no knowledge of this and there is no off-anticoag prot S levels available for my review on the chart, so this would draw a question as to validity of dx   - no fhx of recurrent VTE   - most recent - chronic LLE fem V DVT as of 9/2022 - stable   Thrombophilia/hypercoag evaluation:  not recommended  - no imaging surveillance needed at this time  - antthrombotic plan as noted below   - could consider prot S level during interruption of xarelto for IVC filter retrieval to help with future decision-making or at any future time when pt is off xarelto for >48h such as prior to any future surgery or other major procedures     2) Aortic stenosis - no symptoms, non progressive  - defer surveillance and mgmt to cardiology     IVC filter, placed 9/26/22  Discussed the risks and  benefits of leaving the IVC filter in place versus removing it.  Risks of leaving filter in place, although rare in occurrence, include filter fracture, development of clot above filter causing PE, and/or mobility of filter.  Explained procedure for removal.  Discussed possibility of complications such as inability to remove filter if a thrombosis is found within filter or if it is embedded in the vessel it may be left in place or require specialty surgical or interventional radiology consultation for removal.   Future Surgeries: none   Bleeding complications while on anticoagulation: had 1 episode of retroperitoneal hemorrhage   Decision:   - recommended for IVC filter retrieval as no longer has indications  - stable for IVC filter removal.    - reviewed of options including maintenance of filter and retrieval, and through [id number] has opted for retrieval in line with current FDA recommendations for filter retrieval when no longer indicated   - APRN to coordinate with local IR, vascular surgeon, and/or with [place] IVC filter retrieval program  for more complex cases   - provided review of anatomy and basics of retrieval procedure in addition to a pt educ handout.     ANTITHROMBOTIC THERAPY:  Date of initiation: >20yrs   HAS-BLED bleeding risk calc ([email or web address]): 2 pt, 4.1%, moderate risk   Factors to consider for indefinite OAC: recurrent, ? Prot S deficiency   Last CBC, BMP: reviewed above   Expected duration: reviewed standard of care as per Chest 2021 guidelines is 3-6 months minimum on full dose OAC.  After review of risks/benefits/alternatives, through shared decision-making, we will continue indefinitely xarelto 20mg   Antithrombotic therapy plan:  - continue xarelto 20mg daily indefinitely - annual risk/benefit review  - ongoing mgmt per AC clinic   - update CBC now and needs ongoing Q6mo surveillance as per AC clinic standard  - counseled on signs and symptoms of acute VTE that require  seeking prompt attention in the ED to include shortness of breath, chest pain, pain with deep inhalation, acute leg swelling and/or pain in calf or leg   - elevate legs as much as possible, use compression stockings/socks if directed by your provider  - Avoid hormonal therapies including estrogen or testosterone-containing meds, or raloxifene or tamoxifene (commonly used for osteoporosis)  - Avoid sedentary periods  - continue complete avoidance of tobacco products  - if having any invasive procedure,please make sure the doctor knows of your history of blood clots and current anticoagulation status  - avoid Aspirin and anti-inflammatories (eg. Advil, ibuprofen, Aleve, naproxen, etc) while anticoagulated   - avoid skiing or other dangerous activities to reduce risk of head injury and brain bleeds  - recommended to see your PCP to discuss if you need age-appropriate cancer screenings as a small % of blood clots may be caused by an underlying malignancy  - if any bleeding lasting 30min without stopping, please seek care with your PCP, urgent care, or ED  - reversal agents for most blood thinners are now available and used if you have major bleeding    LIPID MANAGEMENT:   Qualifies for Statin Therapy Based on 2018 ACC/AHA Guidelines: yes, no guidelines for >74yo primary prevention  The ASCVD Risk score (Stacie DK, et al., 2019) failed to calculate., N/A  Major ASCVD events: None  High-risk conditions: N/A  Risk-enhancers: N/A  Currently on Statin: Yes  Tx goals: LDL-C <100 (consider non-HDL-C <130, apoB <90), reasonable   At goal? yes  Plan:   - reinforced ongoing TLC measures as noted   - monitor labs   Meds:   - continue atorva 20mg daily     BLOOD PRESSURE MANAGEMENT:  ACC/AHA (2017) goal <130/80  Home BP at goal: yes  Office BP at goal:  yes   Plan:   - continue healthy diet, activity, weight mgmt   Monitoring:   - routine clinic-based BP measurements at least once annually   Medications: no meds indicated at this  time      GLYCEMIC STATUS:  Normal    LIFESTYLE INTERVENTIONS:    SMOKING:    reports that she has never smoked. She has never used smokeless tobacco.   - continued complete avoidance of all tobacco products     PHYSICAL ACTIVITY: continue healthy activity to improve CV fitness.  In general, targeting >150min/week of moderate-level activity or as much as tolerated in light of functional status and co-morbidities     WEIGHT MANAGEMENT AND NUTRITION: Mediterranean style dietary approach       OTHER:    # s/p back-surgery- stable, continue PT and f/u with back surg     Instructed to follow-up with PCP for remainder of adult medical needs: yes  We will partner with other providers in the management of established vascular disease and cardiometabolic risk factors.    Studies to Be Obtained: IVC filter retrieval    Labs to Be Obtained: as noted above     Follow up in: ongoing care with AC clinic for DOAC monitoring, vasc med available prn     Toney Guidry M.D.  [facility]   [facility]   [phone]

## 2022-12-15 NOTE — PATIENT INSTRUCTIONS
As reviewed with you, we have opted for IVC filter retrieval. TREVON Roy, will coordinate your appointment with our interventional radiology staff to complete the retrieval.  She can be reached at [phone] for questions.    - if there are any complexities with retrieval of the filter locally, our staff will discuss other options with you including coordination with your insurance for filter retrieval to be completed at [place]'s IVC filter retrieval program   - please review the educational materials I have provided

## 2022-12-16 ENCOUNTER — HOSPITAL ENCOUNTER (OUTPATIENT)
Dept: LAB | Facility: MEDICAL CENTER | Age: 87
End: 2022-12-16
Attending: FAMILY MEDICINE
Payer: MEDICARE

## 2022-12-16 ENCOUNTER — ANTICOAGULATION VISIT (OUTPATIENT)
Dept: MEDICAL GROUP | Facility: PHYSICIAN GROUP | Age: 87
End: 2022-12-16
Payer: MEDICARE

## 2022-12-16 DIAGNOSIS — I82.5Y2 CHRONIC DEEP VEIN THROMBOSIS (DVT) OF PROXIMAL VEIN OF LEFT LOWER EXTREMITY (HCC): ICD-10-CM

## 2022-12-16 DIAGNOSIS — Z79.01 LONG TERM (CURRENT) USE OF ANTICOAGULANTS: Primary | ICD-10-CM

## 2022-12-16 DIAGNOSIS — D62 ACUTE BLOOD LOSS ANEMIA: ICD-10-CM

## 2022-12-16 LAB
ERYTHROCYTE [DISTWIDTH] IN BLOOD BY AUTOMATED COUNT: 48.6 FL (ref 35.9–50)
HCT VFR BLD AUTO: 40.3 % (ref 37–47)
HGB BLD-MCNC: 12.3 G/DL (ref 12–16)
INR PPP: 1.1 (ref 2–3.5)
MCH RBC QN AUTO: 27 PG (ref 27–33)
MCHC RBC AUTO-ENTMCNC: 30.5 G/DL (ref 33.6–35)
MCV RBC AUTO: 88.4 FL (ref 81.4–97.8)
PLATELET # BLD AUTO: 223 K/UL (ref 164–446)
PMV BLD AUTO: 12.3 FL (ref 9–12.9)
RBC # BLD AUTO: 4.56 M/UL (ref 4.2–5.4)
WBC # BLD AUTO: 6.6 K/UL (ref 4.8–10.8)

## 2022-12-16 PROCEDURE — 85027 COMPLETE CBC AUTOMATED: CPT

## 2022-12-16 PROCEDURE — 36415 COLL VENOUS BLD VENIPUNCTURE: CPT

## 2022-12-16 PROCEDURE — 85610 PROTHROMBIN TIME: CPT | Performed by: INTERNAL MEDICINE

## 2022-12-16 PROCEDURE — 93793 ANTICOAG MGMT PT WARFARIN: CPT | Performed by: INTERNAL MEDICINE

## 2022-12-16 NOTE — PROGRESS NOTES
Anticoagulation Summary  As of 2022      INR goal:     TTR:  0.0 % (2.3 y)   INR used for dosin.10 (2022)   Warfarin maintenance plan:  No maintenance plan   Plan last modified:  Steve Hahn, PharmD (2020)   Next INR check:  12/15/2023   Target end date:  Indefinite    Indications    Long term (current) use of anticoagulants [Z79.01]  Deep vein thrombosis - recurrent  on chronic anticoagulation [I82.409]  Atrial fibrillation [I48.91] (Resolved) [I48.91]                 Anticoagulation Episode Summary       INR check location:      Preferred lab:      Send INR reminders to:      Comments:            Anticoagulation Care Providers       Provider Role Specialty Phone number    Davon Waters M.D. Referring Internal Medicine [phone]    [facility] Responsible  [phone]          Anticoagulation Patient Findings       Target end date:Indefinite     Indication: DVT and A-Fib     Drug: Xarelto 20mg QD     CHADsVASC = at least 6    Health Status Since Last Assessment   Patient had laminectomy done in September, was found to have DVT and PE post-op d/t being off Xarelto for procedure.  IVC filter was placed at that time, vascular medicine has cleared patient to have removed.    Adherence with DOAC Therapy   Pt has NO missed any doses in the average week    Bleeding Risk Assessment     Denies Epistaxis   Pt denies any excessive or unusual bleeding/hematomas.  Pt denies any GI bleeds or hematemesis.  Pt denies any concerning daily headache or sub dural hematoma symptoms.     Pt denies any hematuria.   Latest Hemoglobin 7.7 - was during inpatient stay, has order for repeat.   ETOH overuse Negative     Creatinine Clearance/Renal Function     Latest ClCr >30 mL/min    Hepatic function   Latest LFTs WNL   Pt denies any history of liver dysfunction      Drug Interactions   Platelets: 326   ASA/other antiplatelets Negative   NSAID Negative   Other drug interactions Negative   X  Verified no anticonvulsant or azole therapy, education provided for future use.     Examination   Blood Pressure - declined by patient today   Symptomatic hypotension Negative   Significant gait impairment/imbalance/high risk for falls? Using cane for balance and gait    Final Assessment and Recommendations:   Patient appears stable from the anticoagulation standpoint.     Benefits of continued DOAC therapy outweigh risks for this patient   Recommend pt continue with current DOAC therapy Xarelto 20mg QD   DOAC is affordable     Other Actions: cmp/ cbc hemogram ordered prior to next visit    Follow up:   Will follow up with patient 12  months.      Steve Hahn, PharmD, BCACP

## 2022-12-28 ENCOUNTER — PRE-ADMISSION TESTING (OUTPATIENT)
Dept: ADMISSIONS | Facility: MEDICAL CENTER | Age: 87
End: 2022-12-28
Attending: FAMILY MEDICINE
Payer: MEDICARE

## 2022-12-29 ENCOUNTER — OFFICE VISIT (OUTPATIENT)
Dept: RHEUMATOLOGY | Facility: MEDICAL CENTER | Age: 87
End: 2022-12-29
Attending: NURSE PRACTITIONER
Payer: MEDICARE

## 2022-12-29 ENCOUNTER — OUTPATIENT INFUSION SERVICES (OUTPATIENT)
Dept: ONCOLOGY | Facility: MEDICAL CENTER | Age: 87
End: 2022-12-29
Attending: INTERNAL MEDICINE
Payer: MEDICARE

## 2022-12-29 ENCOUNTER — PRE-ADMISSION TESTING (OUTPATIENT)
Dept: ADMISSIONS | Facility: MEDICAL CENTER | Age: 87
End: 2022-12-29
Attending: NURSE PRACTITIONER
Payer: MEDICARE

## 2022-12-29 VITALS
BODY MASS INDEX: 28.17 KG/M2 | RESPIRATION RATE: 12 BRPM | SYSTOLIC BLOOD PRESSURE: 130 MMHG | TEMPERATURE: 97.7 F | DIASTOLIC BLOOD PRESSURE: 72 MMHG | WEIGHT: 154 LBS | HEART RATE: 112 BPM | OXYGEN SATURATION: 94 %

## 2022-12-29 VITALS
SYSTOLIC BLOOD PRESSURE: 150 MMHG | TEMPERATURE: 97 F | OXYGEN SATURATION: 100 % | WEIGHT: 154.98 LBS | RESPIRATION RATE: 18 BRPM | BODY MASS INDEX: 27.46 KG/M2 | HEART RATE: 90 BPM | HEIGHT: 63 IN | DIASTOLIC BLOOD PRESSURE: 90 MMHG

## 2022-12-29 DIAGNOSIS — Z79.01 CHRONIC ANTICOAGULATION: ICD-10-CM

## 2022-12-29 DIAGNOSIS — G62.9 NEUROPATHY: ICD-10-CM

## 2022-12-29 DIAGNOSIS — C43.9 MALIGNANT MELANOMA, UNSPECIFIED SITE (HCC): ICD-10-CM

## 2022-12-29 DIAGNOSIS — M81.0 OSTEOPOROSIS WITHOUT CURRENT PATHOLOGICAL FRACTURE, UNSPECIFIED OSTEOPOROSIS TYPE: ICD-10-CM

## 2022-12-29 DIAGNOSIS — M15.9 PRIMARY OSTEOARTHRITIS INVOLVING MULTIPLE JOINTS: ICD-10-CM

## 2022-12-29 DIAGNOSIS — I10 ESSENTIAL HYPERTENSION, BENIGN: ICD-10-CM

## 2022-12-29 DIAGNOSIS — Z01.812 PRE-OPERATIVE LABORATORY EXAMINATION: ICD-10-CM

## 2022-12-29 DIAGNOSIS — H35.30 MACULAR DEGENERATION OF BOTH EYES, UNSPECIFIED TYPE: ICD-10-CM

## 2022-12-29 DIAGNOSIS — I35.0 AORTIC VALVE STENOSIS, ETIOLOGY OF CARDIAC VALVE DISEASE UNSPECIFIED: ICD-10-CM

## 2022-12-29 DIAGNOSIS — M81.0 OSTEOPOROSIS, UNSPECIFIED OSTEOPOROSIS TYPE, UNSPECIFIED PATHOLOGICAL FRACTURE PRESENCE: ICD-10-CM

## 2022-12-29 DIAGNOSIS — D68.59 PROTEIN S DEFICIENCY (HCC): ICD-10-CM

## 2022-12-29 LAB
CA-I BLD ISE-SCNC: 1.14 MMOL/L (ref 1.1–1.3)
CREAT BLD-MCNC: 0.7 MG/DL (ref 0.5–1.4)
CREAT SERPL-MCNC: 0.59 MG/DL (ref 0.5–1.4)
ERYTHROCYTE [DISTWIDTH] IN BLOOD BY AUTOMATED COUNT: 50.3 FL (ref 35.9–50)
GFR SERPLBLD CREATININE-BSD FMLA CKD-EPI: 88 ML/MIN/1.73 M 2
HCT VFR BLD AUTO: 42.2 % (ref 37–47)
HGB BLD-MCNC: 13.2 G/DL (ref 12–16)
INR PPP: 1.31 (ref 0.87–1.13)
MCH RBC QN AUTO: 26.9 PG (ref 27–33)
MCHC RBC AUTO-ENTMCNC: 31.3 G/DL (ref 33.6–35)
MCV RBC AUTO: 86.1 FL (ref 81.4–97.8)
PLATELET # BLD AUTO: 220 K/UL (ref 164–446)
PMV BLD AUTO: 12.2 FL (ref 9–12.9)
PROTHROMBIN TIME: 16 SEC (ref 12–14.6)
RBC # BLD AUTO: 4.9 M/UL (ref 4.2–5.4)
WBC # BLD AUTO: 6.8 K/UL (ref 4.8–10.8)

## 2022-12-29 PROCEDURE — 82565 ASSAY OF CREATININE: CPT

## 2022-12-29 PROCEDURE — 700111 HCHG RX REV CODE 636 W/ 250 OVERRIDE (IP): Mod: JG | Performed by: INTERNAL MEDICINE

## 2022-12-29 PROCEDURE — 85027 COMPLETE CBC AUTOMATED: CPT

## 2022-12-29 PROCEDURE — 85610 PROTHROMBIN TIME: CPT

## 2022-12-29 PROCEDURE — 99212 OFFICE O/P EST SF 10 MIN: CPT | Mod: 25 | Performed by: INTERNAL MEDICINE

## 2022-12-29 PROCEDURE — 82330 ASSAY OF CALCIUM: CPT

## 2022-12-29 PROCEDURE — 96372 THER/PROPH/DIAG INJ SC/IM: CPT

## 2022-12-29 PROCEDURE — 99214 OFFICE O/P EST MOD 30 MIN: CPT | Performed by: INTERNAL MEDICINE

## 2022-12-29 PROCEDURE — 36415 COLL VENOUS BLD VENIPUNCTURE: CPT

## 2022-12-29 PROCEDURE — 82565 ASSAY OF CREATININE: CPT | Mod: 91

## 2022-12-29 RX ORDER — PHENOL 1.4 %
600 AEROSOL, SPRAY (ML) MUCOUS MEMBRANE EVERY MORNING
Status: ON HOLD | COMMUNITY
Start: 2021-04-20

## 2022-12-29 RX ADMIN — DENOSUMAB 60 MG: 60 INJECTION SUBCUTANEOUS at 15:50

## 2022-12-29 ASSESSMENT — FIBROSIS 4 INDEX
FIB4 SCORE: 2.39
FIB4 SCORE: 2.39

## 2022-12-29 NOTE — PROGRESS NOTES
Chief Complaint- joint pain     Subjective:   Marry Mathur is a 87 y.o. female here today for follow up of rheumatological issues    This is a follow-up visit for this patient who we see in this clinic for osteoarthritis.  Patient does have a remote diagnosis of rheumatoid arthritis in the past but patient has been consistently negative serologies and negative x-rays.  Patient is currently not on any treatment, and states she is doing fine.    Current issues are that patient is status post low back surgery for spinal stenosis by Dr. Diggs in neurosurgery with benefit, patient is going to be starting physical therapy.     Complicating factor is that patient's just been diagnosed with metastatic melanoma with positive lymph nodes from the left axilla.  Patient currently undergoing evaluation for immunotherapy treatment.      Other issues include the development of leg length discrepancy status post left CARLYLE with left leg longer than the right.  Patient just recently got shoes to help adjust her leg length.      Other issues include a finding of osteoporosis on patient's bone density scan from March 2018, patient was not able to tolerate Fosamax because of a history of Araya's esophagus, patient currently on Prolia 60 mg subcu every 6 months with benefit.        Additional comorbidities include diabetes for which patient  takes metformin, also with a diagnosis of spinal stenosis with intermittent weakness in her legs with progressive numbness and weakness in the left leg.  Patient also with protein S deficiency with a history of DVT on chronic anticoagulation.      Patient also with moderate aortic stenosis followed by cardiology     S/p NSAIDS-unable to take because of chronic anticoagulation     Left TKA   Left CARLYLE      S/p Remicade-stopped because of hx of melanoma about 1996 and has multiple other skin cancers  S/p Orencia-lost efficacy  S/p Humira-stopped because of recurrence of melanoma October  2017  S/p MTX-stopped because of development of skin cancer/melanoma October 2017  S/p NSAIDS-relatively contraindicated as patient is on chronic anticoagulation   S/p xeljanz-stopped 2018 because of the development of colonic adenocarcinoma  S/p fosamax-unable to tolerate-GI upset      G6PD 13.6 adequate 5/2019  DEXA 3/18/2016 T scores 1.1, -1.1   FRAX 3/18/2016 major osteoporotic fracture risk 14.3%, hip fracture risks 3.7%  DEXA 3/23/2018 T scores 0.2, -1.4  FRAX 3/23/2018 major osteoporotic fracture risk 19.3%, hip fracture risk 6.1%  DEXA 6/2/2020 T scores 0.7, -1.4  FRAX 6/2/2020 major osteoporotic fracture risk 17.2%, hip fracture risk 4.9%  DEXA 8/11/2022 T score 1.2 -1.1  FRAX 8/11/2022  major osteoporotic fracture risk 15.4%, hip fracture risk 4.2%     Prolia started 6/2019     Echocardiogram 7/2012 normal [facility]  Echocardiogram 4/2021-CONCLUSIONS  Compared to the images of the study done 04- there has been no   significant change.  Left ventricular ejection fraction is visually estimated to be 65%.  Moderate aortic stenosis.  Unable to estimate pulmonary artery pressure due to an inadequate   tricuspid regurgitant jet.  Echocardiogram 5/2022-CONCLUSIONS  The left ventricular ejection fraction is visually estimated to be 60-  65%.  Indeterminate diastolic function due to significant mitral   calcification.  Normal right ventricular size and systolic function.  Estimated right ventricular systolic pressure is 35 mmHg.  Mildly dilated left atrium.  Moderate mitral annular calcification without significant mitral   stenosis.  Moderate to severe aortic valve stenosis by Doppler assessment,   although visually the aortic valve appears at most moderately stenotic   visually.  Transvalvular gradients are - Peak: 59 mmHg, Mean:  33 mmHg.  Vmax is 3.8 m/s. DVI is 0.22.  Normal inferior vena cava size and inspiratory collapse.        RF neg 3/2014 [facility]; RF neg 6/2014 [facility]; RF neg  6/2016  CCP neg 3/2014 [facility]; CCP neg 6/2014 [facility]; CCP neg 6/2016  Uric acid 4.7 3/2014 [facility];Uric acid 4.5 6/2016  Hep B neg 6/2016  Quantiferon Gold neg 6/2016     Hand x-rays 3/2016-indicate osteoarthritis     Feet x-rays 3/2016-indicate osteoarthritis         Corticosteroid Therapy Informed Consent signed 1/17/2019-copy given to patient          Current Outpatient Medications   Medication Sig Dispense Refill    tizanidine (ZANAFLEX) 2 MG tablet Take 1 Tablet by mouth 3 times a day. 90 Tablet 2    NS SOLN 100 mL with nivolumab 240 MG/24ML SOLN Infuse  into a venous catheter one time.      rivaroxaban (XARELTO) 20 MG Tab tablet Take 1 Tablet by mouth with dinner. 90 Tablet 3    polyethylene glycol/lytes (MIRALAX) 17 g Pack Take 1 Packet by mouth 1 time a day as needed (if sennosides and docusate ineffective after 24 hours).  3    acetaminophen (TYLENOL) 500 MG Tab Take 1,000 mg by mouth 3 times a day as needed. Indications: Pain      tizanidine (ZANAFLEX) 2 MG tablet Take 1 Tablet by mouth 3 times a day as needed (muscle spasm). 90 Tablet 3    melatonin 5 mg Tab Take 10 mg by mouth every evening.      gabapentin (NEURONTIN) 300 MG Cap Take 2 Capsules by mouth at bedtime. 180 Capsule 2    atorvastatin (LIPITOR) 10 MG Tab Take 1 Tablet by mouth every evening. 90 Tablet 1    omeprazole (PRILOSEC) 20 MG delayed-release capsule Take 1 Capsule by mouth every day. 90 Capsule 3    vitamin D (CHOLECALCIFEROL) 1000 UNIT Tab Take 500 Units by mouth every morning.       No current facility-administered medications for this visit.     She  has a past medical history of Adenocarcinoma of colon ([facility]) (10/30/2018), Anesthesia, Back pain (06/01/2022), Blood clotting disorder ([facility]) (2012), Bowel habit changes, Cancer (HCC), Cataract, Chronic back pain greater than 3 months duration, Deep vein thrombosis (HCC) (03/08/2016), Essential hypertension, benign (06/27/2012), GERD (gastroesophageal reflux disease) (06/27/2012), Heart  murmur, High cholesterol, Hyperlipidemia, Hypertension, Impaired fasting glucose (11/02/2017), Melanoma (HCC), Mild aortic stenosis, Other and unspecified hyperlipidemia (06/27/2012), Prediabetes, Protein S deficiency (HCC) (11/02/2017), Rheumatoid arthritis involving multiple sites with positive rheumatoid factor (HCC) (03/14/2016), Rheumatoid nodule (HCC) (07/25/2017), Right bundle branch block (06/27/2012), and Urinary incontinence (11/02/2017).    ROS   Other than what is mentioned in HPI or physical exam, there is no history of headaches, double vision or blurred vision. No temporal tenderness or jaw claudication. No trouble swallowing difficulties or sore throats.  No chest complaints including chest pain, cough or sputum production. No GI complaints including nausea, vomiting, change in bowel habits, or past peptic ulcer disease. No history of blood in the stools. No urinary complaints including dysuria or frequency. No history of alopecia, photosensitivity, oral ulcerations, Raynaud's phenomena.       Objective:     /72   Pulse (!) 112   Temp 36.5 °C (97.7 °F) (Temporal)   Resp 12   Wt 69.9 kg (154 lb)   SpO2 94%  Body mass index is 28.17 kg/m².   Physical Exam:    Constitutional: Alert and oriented X3, patient is talkative with good eye contact.Skin: Warm, dry, good turgor, no rashes in visible areas.Eye: Equal, round and reactive, conjunctiva clear, lids normal EOM intactENMT: Lips without lesions, good dentition, no oropharyngeal ulcers, moist buccal mucosa, pinna without deformityNeck: Trachea midline, no masses, no thyromegaly.Lymph:  No cervical lymphadenopathy, no axillary lymphadenopathy, no supraclavicular lymphadenopathyRespiratory: Unlabored respiratory effort, lungs clear to auscultation, no wheezes, no ronchi.Cardiovascular: Normal S1, S2, .Abdomen: Soft, non-tender, no masses, no hepatosplenomegaly.Psych: Alert and oriented x3, normal affect and mood.Neuro: Cranial nerves 2-12 are  grossly intact, no loss of sensation LEExt:no joint laxity noted in bilateral arms, no joint laxity noted in bilateral legs, multiple areas of Heberden's nodes and Ana's nodes but there is no christin swan-neck or boutonniere deformities, and no synovitis, patient currently walking with a cane status post low back surgery.    Lab Results   Component Value Date/Time    QNTTBGOLD Negative 06/29/2016 02:03 PM     Lab Results   Component Value Date/Time    HEPBCORTOT NonReactive 07/18/2022 01:00 PM    HEPBCORIGM Negative 06/29/2016 02:03 PM    HEPBSAG Non-Reactive 07/18/2022 01:00 PM     Lab Results   Component Value Date/Time    SODIUM 140 12/06/2022 10:27 AM    POTASSIUM 4.7 12/06/2022 10:27 AM    CHLORIDE 104 12/06/2022 10:27 AM    CO2 21 12/06/2022 10:27 AM    GLUCOSE 93 12/06/2022 10:27 AM    BUN 23 (H) 12/06/2022 10:27 AM    CREATININE 0.65 12/06/2022 10:27 AM      Lab Results   Component Value Date/Time    WBC 6.8 12/29/2022 12:35 PM    RBC 4.90 12/29/2022 12:35 PM    HEMOGLOBIN 13.2 12/29/2022 12:35 PM    HEMATOCRIT 42.2 12/29/2022 12:35 PM    MCV 86.1 12/29/2022 12:35 PM    MCH 26.9 (L) 12/29/2022 12:35 PM    MCHC 31.3 (L) 12/29/2022 12:35 PM    MPV 12.2 12/29/2022 12:35 PM    NEUTSPOLYS 71.10 10/06/2022 02:35 AM    LYMPHOCYTES 14.80 (L) 10/06/2022 02:35 AM    MONOCYTES 9.80 10/06/2022 02:35 AM    EOSINOPHILS 3.30 10/06/2022 02:35 AM    BASOPHILS 0.50 10/06/2022 02:35 AM      Lab Results   Component Value Date/Time    CALCIUM 9.1 12/06/2022 10:27 AM    ASTSGOT 22 12/06/2022 10:27 AM    ALTSGPT 13 12/06/2022 10:27 AM    ALKPHOSPHAT 78 12/06/2022 10:27 AM    TBILIRUBIN 0.4 12/06/2022 10:27 AM    ALBUMIN 4.5 12/06/2022 10:27 AM    TOTPROTEIN 7.0 12/06/2022 10:27 AM     Lab Results   Component Value Date/Time    URICACID 4.5 06/29/2016 02:03 PM    RHEUMFACTN <10 06/29/2016 02:03 PM    CCPANTIBODY 4 06/29/2016 02:03 PM     Lab Results   Component Value Date/Time    SEDRATEWES 11 07/07/2018 11:25 AM     Lab Results    Component Value Date/Time    HBA1C 5.6 09/16/2022 01:14 PM     Lab Results   Component Value Date/Time    G6PD 13.6 05/20/2019 10:01 AM     Lab Results   Component Value Date/Time    CPKTOTAL 61 06/29/2016 02:03 PM     Assessment and Plan:     1. Primary osteoarthritis involving multiple joints  Unable to take NSAIDs as patient is on chronic anticoagulation, and is currently off Plaquenil because is undergoing immunotherapy for malignant melanoma, patient takes Tylenol as needed.  Patient feels she is doing just fine.    2. Osteoporosis without current pathological fracture, unspecified osteoporosis type  Last DEXA August 2022 Next DEXA August 2024 patient currently on Prolia 60 mg subcu every 6 months   continue calcium citrate 1200 mg by mouth daily and vitamin D about 2000 units by mouth daily and magnesium 200 mg by mouth daily    3. Chronic anticoagulation  This will impact with kind of medications we can use for this patient's arthritis, NSAIDs are contraindicated because of increased risk of bleeding while on chronic anticoagulation    4. Protein S deficiency (HCC)  Patient on chronic anticoagulation    5. Essential hypertension, benign  May impact the type of medications we can use for this patient's arthritis. We will have to keep this under advisement.    6. Macular degeneration of both eyes, unspecified type  Followed by Dr. Savage in ophthalmology    7. Malignant melanoma, unspecified site (HCC)  Currently undergoing [facility]    8. Neuropathy  Right lower extremity status post lumbar spine surgery with great benefit.    9. Aortic valve stenosis, etiology of cardiac valve disease unspecified  Followed by cardiology    Followup: Return in about 1 year (around 12/29/2023). or sooner jose eduardo Mathur  was seen 30 minutes face-to-face of which more than 50% of the time was spent counseling the patient (excluding time for procedures)  regarding   rheumatological condition and care. Therapy was discussed in detail.      Please note that this dictation was created using voice recognition software. I have made every reasonable attempt to correct obvious errors, but I expect that there are errors of grammar and possibly content that I did not discover before finalizing the note.

## 2022-12-30 NOTE — PROGRESS NOTES
Pt presents to [facility] for prolia injection. Pt denies any oral surgery in the last 6 weeks and is taking calcium and vitamin D; education provided and verbalized understanding. Labs drawn from [facility] A/[facility]; brisk blood return observed and pt tolerated well. Labs within treatable parameters. Prolia injected into R back arm with no s/s of adverse reactions. Next appointment confirmed and education provided. Pt discharged to self care with all personal belongings and in no apparent distress..

## 2023-01-05 ENCOUNTER — HOSPITAL ENCOUNTER (OUTPATIENT)
Facility: MEDICAL CENTER | Age: 88
End: 2023-01-05
Attending: INTERNAL MEDICINE
Payer: MEDICARE

## 2023-01-05 PROCEDURE — 84443 ASSAY THYROID STIM HORMONE: CPT

## 2023-01-05 PROCEDURE — 80053 COMPREHEN METABOLIC PANEL: CPT

## 2023-01-06 DIAGNOSIS — E78.5 HYPERLIPIDEMIA, UNSPECIFIED HYPERLIPIDEMIA TYPE: ICD-10-CM

## 2023-01-06 LAB
ALBUMIN SERPL BCP-MCNC: 4.5 G/DL (ref 3.2–4.9)
ALBUMIN/GLOB SERPL: 2 G/DL
ALP SERPL-CCNC: 77 U/L (ref 30–99)
ALT SERPL-CCNC: 13 U/L (ref 2–50)
ANION GAP SERPL CALC-SCNC: 13 MMOL/L (ref 7–16)
AST SERPL-CCNC: 20 U/L (ref 12–45)
BILIRUB SERPL-MCNC: 0.5 MG/DL (ref 0.1–1.5)
BUN SERPL-MCNC: 23 MG/DL (ref 8–22)
CALCIUM ALBUM COR SERPL-MCNC: 8.4 MG/DL (ref 8.5–10.5)
CALCIUM SERPL-MCNC: 8.8 MG/DL (ref 8.5–10.5)
CHLORIDE SERPL-SCNC: 101 MMOL/L (ref 96–112)
CO2 SERPL-SCNC: 23 MMOL/L (ref 20–33)
CREAT SERPL-MCNC: 0.6 MG/DL (ref 0.5–1.4)
GFR SERPLBLD CREATININE-BSD FMLA CKD-EPI: 87 ML/MIN/1.73 M 2
GLOBULIN SER CALC-MCNC: 2.3 G/DL (ref 1.9–3.5)
GLUCOSE SERPL-MCNC: 103 MG/DL (ref 65–99)
POTASSIUM SERPL-SCNC: 4.6 MMOL/L (ref 3.6–5.5)
PROT SERPL-MCNC: 6.8 G/DL (ref 6–8.2)
SODIUM SERPL-SCNC: 137 MMOL/L (ref 135–145)
TSH SERPL DL<=0.005 MIU/L-ACNC: 1.32 UIU/ML (ref 0.38–5.33)

## 2023-01-08 RX ORDER — ATORVASTATIN CALCIUM 10 MG/1
10 TABLET, FILM COATED ORAL EVERY EVENING
Qty: 90 TABLET | Refills: 1 | Status: SHIPPED | OUTPATIENT
Start: 2023-01-08 | End: 2023-03-28 | Stop reason: SDUPTHER

## 2023-01-08 NOTE — TELEPHONE ENCOUNTER
Requested Prescriptions     Signed Prescriptions Disp Refills    atorvastatin (LIPITOR) 10 MG Tab 90 Tablet 1     Sig: Take 1 Tablet by mouth every evening.     Authorizing Provider: ORALIA MONTANO A.P.R.N.    Yes

## 2023-01-09 ENCOUNTER — APPOINTMENT (OUTPATIENT)
Dept: RADIOLOGY | Facility: MEDICAL CENTER | Age: 88
End: 2023-01-09
Attending: NURSE PRACTITIONER
Payer: MEDICARE

## 2023-01-09 ENCOUNTER — HOSPITAL ENCOUNTER (OUTPATIENT)
Facility: MEDICAL CENTER | Age: 88
End: 2023-01-09
Attending: FAMILY MEDICINE | Admitting: FAMILY MEDICINE
Payer: MEDICARE

## 2023-01-09 VITALS
TEMPERATURE: 97.8 F | DIASTOLIC BLOOD PRESSURE: 79 MMHG | BODY MASS INDEX: 26.41 KG/M2 | OXYGEN SATURATION: 94 % | RESPIRATION RATE: 19 BRPM | WEIGHT: 149.03 LBS | SYSTOLIC BLOOD PRESSURE: 176 MMHG | HEIGHT: 63 IN | HEART RATE: 74 BPM

## 2023-01-09 DIAGNOSIS — Z95.828 PRESENCE OF IVC FILTER: ICD-10-CM

## 2023-01-09 PROCEDURE — 99153 MOD SED SAME PHYS/QHP EA: CPT

## 2023-01-09 PROCEDURE — 700105 HCHG RX REV CODE 258: Performed by: RADIOLOGY

## 2023-01-09 PROCEDURE — 160046 HCHG PACU - 1ST 60 MINS PHASE II

## 2023-01-09 PROCEDURE — 160035 HCHG PACU - 1ST 60 MINS PHASE I

## 2023-01-09 PROCEDURE — 160002 HCHG RECOVERY MINUTES (STAT)

## 2023-01-09 PROCEDURE — 700117 HCHG RX CONTRAST REV CODE 255: Performed by: NURSE PRACTITIONER

## 2023-01-09 PROCEDURE — 160036 HCHG PACU - EA ADDL 30 MINS PHASE I

## 2023-01-09 PROCEDURE — 700111 HCHG RX REV CODE 636 W/ 250 OVERRIDE (IP)

## 2023-01-09 PROCEDURE — 700111 HCHG RX REV CODE 636 W/ 250 OVERRIDE (IP): Performed by: RADIOLOGY

## 2023-01-09 RX ORDER — HYDROMORPHONE HYDROCHLORIDE 1 MG/ML
0.5 INJECTION, SOLUTION INTRAMUSCULAR; INTRAVENOUS; SUBCUTANEOUS
Status: CANCELLED | OUTPATIENT
Start: 2023-01-09 | End: 2023-01-10

## 2023-01-09 RX ORDER — SODIUM CHLORIDE 9 MG/ML
500 INJECTION, SOLUTION INTRAVENOUS
Status: DISPENSED | OUTPATIENT
Start: 2023-01-09 | End: 2023-01-09

## 2023-01-09 RX ORDER — MIDAZOLAM HYDROCHLORIDE 1 MG/ML
INJECTION INTRAMUSCULAR; INTRAVENOUS
Status: COMPLETED
Start: 2023-01-09 | End: 2023-01-09

## 2023-01-09 RX ORDER — GABAPENTIN 300 MG/1
300 CAPSULE ORAL
COMMUNITY
End: 2023-01-13

## 2023-01-09 RX ORDER — ONDANSETRON 2 MG/ML
4 INJECTION INTRAMUSCULAR; INTRAVENOUS EVERY 6 HOURS PRN
Status: ACTIVE | OUTPATIENT
Start: 2023-01-09 | End: 2023-01-09

## 2023-01-09 RX ORDER — MIDAZOLAM HYDROCHLORIDE 1 MG/ML
.5-2 INJECTION INTRAMUSCULAR; INTRAVENOUS PRN
Status: ACTIVE | OUTPATIENT
Start: 2023-01-09 | End: 2023-01-09

## 2023-01-09 RX ORDER — TIZANIDINE 2 MG/1
2 TABLET ORAL 2 TIMES DAILY
COMMUNITY
End: 2023-02-17

## 2023-01-09 RX ORDER — SODIUM CHLORIDE 9 MG/ML
1000 INJECTION, SOLUTION INTRAVENOUS
Status: COMPLETED | OUTPATIENT
Start: 2023-01-09 | End: 2023-01-09

## 2023-01-09 RX ORDER — OXYCODONE HYDROCHLORIDE 5 MG/1
5 TABLET ORAL
Status: CANCELLED | OUTPATIENT
Start: 2023-01-09 | End: 2023-01-10

## 2023-01-09 RX ADMIN — FENTANYL CITRATE 25 MCG: 50 INJECTION, SOLUTION INTRAMUSCULAR; INTRAVENOUS at 12:00

## 2023-01-09 RX ADMIN — MIDAZOLAM HYDROCHLORIDE 1 MG: 1 INJECTION INTRAMUSCULAR; INTRAVENOUS at 12:00

## 2023-01-09 RX ADMIN — FENTANYL CITRATE 25 MCG: 50 INJECTION, SOLUTION INTRAMUSCULAR; INTRAVENOUS at 12:07

## 2023-01-09 RX ADMIN — SODIUM CHLORIDE 1000 ML: 9 INJECTION, SOLUTION INTRAVENOUS at 08:46

## 2023-01-09 RX ADMIN — IOHEXOL 25 ML: 300 INJECTION, SOLUTION INTRAVENOUS at 13:30

## 2023-01-09 RX ADMIN — MIDAZOLAM HYDROCHLORIDE 1 MG: 1 INJECTION, SOLUTION INTRAMUSCULAR; INTRAVENOUS at 12:00

## 2023-01-09 ASSESSMENT — FIBROSIS 4 INDEX: FIB4 SCORE: 2.17

## 2023-01-09 NOTE — OR SURGEON
Immediate Post- Operative Note        Findings: clot at filter tip, adherent to ivc, unable to retrieve      Procedure(s): cavagram with attempted filter retrieval  D/w pt      Estimated Blood Loss: Less than 5 ml        Complications: None            1/9/2023     12:33 PM     Kolby Kirk M.D.

## 2023-01-09 NOTE — DISCHARGE INSTRUCTIONS
HOME CARE INSTRUCTIONS    ACTIVITY: Rest and take it easy for the first 24 hours.  A responsible adult is recommended to remain with you during that time.  It is normal to feel sleepy.  We encourage you to not do anything that requires balance, judgment or coordination.    FOR 24 HOURS DO NOT:  Drive, operate machinery or run household appliances.  Drink beer or alcoholic beverages.  Make important decisions or sign legal documents.    SPECIAL INSTRUCTIONS: Inferior Vena Cava Filter Removal, Care After  This sheet gives you information about how to care for yourself after your procedure. Your health care provider may also give you more specific instructions. If you have problems or questions, contact your health care provider.  What can I expect after the procedure?  After the procedure, it is common to have:  Mild pain and bruising around your incision in your neck or groin.  Fatigue.  Follow these instructions at home:  Check your incision area every day for signs of infection. Check for:  Redness, swelling, or more pain.  Fluid or blood.  Warmth.  Pus or a bad smell.  General instructions  Take over-the-counter and prescription medicines only as told by your health care provider.  Do not take baths, swim, or use a hot tub until your health care provider approves. Ask your health care provider if you may take showers. You may only be allowed to take sponge baths.  Do not drive for 24 hours if you were given a medicine to help you relax (sedative) during your procedure.  Return to your normal activities as told by your health care provider. Ask your health care provider what activities are safe for you.  Keep all follow-up visits as told by your health care provider. This is important.  Contact a health care provider if:  You have chills or a fever.  You have redness, swelling, or more pain around your incision.  Your incision feels warm to the touch.  You have pus or a bad smell coming from your incision.  Get  help right away if:  You have blood coming from your incision (active bleeding).  If you have bleeding from the incision site, lie down, apply pressure to the area with a clean cloth or gauze, and get help right away.  You have chest pain.  You have difficulty breathing.  Summary  Follow instructions from your health care provider about how to take care of your incision.  Return to your normal activities as told by your health care provider.  Check your incision area every day for signs of infection.  Get help right away if you have active bleeding, chest pain, or trouble breathing.  This information is not intended to replace advice given to you by your health care provider. Make sure you discuss any questions you have with your health care provider.  Document Released: 06/28/2018 Document Revised: 11/30/2018 Document Reviewed: 06/28/2018  [employer] Patient Education © 2020 [employer]      DIET: To avoid nausea, slowly advance diet as tolerated, avoiding spicy or greasy foods for the first day.  Add more substantial food to your diet according to your physician's instructions.  Babies can be fed formula or breast milk as soon as they are hungry.  INCREASE FLUIDS AND FIBER TO AVOID CONSTIPATION.    SURGICAL DRESSING/BATHING: may remove dressing in 24 hours and then shower; avoid soaking in bathtubs, hot tubs or pools    MEDICATIONS: Resume taking daily medication.  Take prescribed pain medication with food.  If no medication is prescribed, you may take non-aspirin pain medication if needed.  PAIN MEDICATION CAN BE VERY CONSTIPATING.  Take a stool softener or laxative such as senokot, pericolace, or milk of magnesia if needed.      A follow-up appointment should be arranged with your doctor; call to schedule.    You should CALL YOUR PHYSICIAN if you develop:  Fever greater than 101 degrees F.  Pain not relieved by medication, or persistent nausea or vomiting.  Excessive bleeding (blood soaking through dressing) or  unexpected drainage from the wound.  Extreme redness or swelling around the incision site, drainage of pus or foul smelling drainage.  Inability to urinate or empty your bladder within 8 hours.  Problems with breathing or chest pain.    You should call 911 if you develop problems with breathing or chest pain.  If you are unable to contact your doctor or surgical center, you should go to the nearest emergency room or urgent care center.  Physician's telephone #: [phone]    MILD FLU-LIKE SYMPTOMS ARE NORMAL.  YOU MAY EXPERIENCE GENERALIZED MUSCLE ACHES, THROAT IRRITATION, HEADACHE AND/OR SOME NAUSEA.    If any questions arise, call your doctor.  If your doctor is not available, please feel free to call the Surgical Center at [phone].  The Center is open Monday through Friday from 7AM to 7PM.      A registered nurse may call you a few days after your surgery to see how you are doing after your procedure.    You may also receive a survey in the mail within the next two weeks and we ask that you take a few moments to complete the survey and return it to us.  Our goal is to provide you with very good care and we value your comments.     Depression / Suicide Risk    As you are discharged from this [facility] facility, it is important to learn how to keep safe from harming yourself.    Recognize the warning signs:  Abrupt changes in personality, positive or negative- including increase in energy   Giving away possessions  Change in eating patterns- significant weight changes-  positive or negative  Change in sleeping patterns- unable to sleep or sleeping all the time   Unwillingness or inability to communicate  Depression  Unusual sadness, discouragement and loneliness  Talk of wanting to die  Neglect of personal appearance   Rebelliousness- reckless behavior  Withdrawal from people/activities they love  Confusion- inability to concentrate     If you or a loved one observes any of these behaviors or has  concerns about self-harm, here's what you can do:  Talk about it- your feelings and reasons for harming yourself  Remove any means that you might use to hurt yourself (examples: pills, rope, extension cords, firearm)  Get professional help from the community (Mental Health, Substance Abuse, psychological counseling)  Do not be alone:Call your Safe Contact- someone whom you trust who will be there for you.  Call your local CRISIS HOTLINE 672-0990 or 279-084-3132  Call your local Children's Mobile Crisis Response Team [place] (584) 697-4857 or www.[employer]  Call the toll free National Suicide Prevention Hotlines   National Suicide Prevention Lifeline 078-682-QIAC (7592)  National Hope Line Network 800-SUICIDE (079-0513)    I acknowledge receipt and understanding of these Home Care instructions.

## 2023-01-09 NOTE — OR NURSING
1500-Took over patient from Clare [facility]ARA. Received report.  1504-Discharge instructions given to pt. Pt verbalized understanding.  Right chest dressing clean, dry and intact.

## 2023-01-09 NOTE — H&P
"History and Physical    Date: 1/9/2023    PCP: CRISTINO Horner      CC: T12 VB lesion    HPI: This is a 87 y.o. female who is presenting with above,     Past Medical History:   Diagnosis Date    Adenocarcinoma of colon (HCC) 10/30/2018    From sessile serrated polyp 10/2018    Anesthesia     pt states \"one new dr scraped my esophagus when they put the tube in and I started bleeding so they couldn't operate\"     Back pain 06/01/2022    0/10    Blood clotting disorder (HCC) 2012    clot in leg    Bowel habit changes     constipation     Cancer (HCC)     skin, colon 2018    Cataract     belia IOL     Chronic back pain greater than 3 months duration     Deep vein thrombosis (HCC) 03/08/2016    First occurrence in [facility] in late 1970s Second occurrence further up in [facility] in 2012, has been on AC since     Essential hypertension, benign 06/27/2012    GERD (gastroesophageal reflux disease) 06/27/2012    Heart murmur     High cholesterol     Hyperlipidemia     Hypertension     hx of, not currently     Impaired fasting glucose 11/02/2017    Melanoma (HCC)     Mild aortic stenosis     Seeing Dr. Van    Other and unspecified hyperlipidemia 06/27/2012    Prediabetes     Protein S deficiency (HCC) 11/02/2017    Noted in lab work 2013 as a part of work up at [facility]     Rheumatoid arthritis involving multiple sites with positive rheumatoid factor (HCC) 03/14/2016    Dr. Escoto    Rheumatoid nodule ([facility]) 07/25/2017    Right bundle branch block 06/27/2012    pt denies     Urinary incontinence 11/02/2017       Past Surgical History:   Procedure Laterality Date    LUMBAR FUSION O-ARM  9/21/2022    Procedure: L3-4 and L4-5 decompressive laminectomy, bilateral foraminotomies and L3-4-5 posterior lumbar instrumented fusion;  Surgeon: Rosa M Bonner M.D.;  Location: SURGERY [facility];  Service: Neurosurgery    LUMBAR DECOMPRESSION  9/21/2022    Procedure: DECOMPRESSION, SPINE, LUMBAR;  Surgeon: Rosa M Bonner M.D.; "  Location: SURGERY [facility];  Service: Neurosurgery    LUMBAR LAMINECTOMY DISKECTOMY  9/21/2022    Procedure: LAMINECTOMY, SPINE, LUMBAR, WITH DISCECTOMY;  Surgeon: Rosa M Bonner M.D.;  Location: [facility];  Service: Neurosurgery    FORAMINOTOMY  9/21/2022    Procedure: FORAMINOTOMY, SPINE;  Surgeon: Rosa M Bonner M.D.;  Location: [facility];  Service: Neurosurgery    MO INJECTION,SACROILIAC JOINT Right 7/12/2022    Procedure: RIGHT sacroiliac joint injection with fluoroscopic guidance.;  Surgeon: Indira Lee M.D.;  [facility];  Service: Pain Management    MO REMOVE ARMPITS LYMPH NODES COMPLT Left 6/7/2022    Procedure: LYMPHADENECTOMY, AXILLARY;  Surgeon: Bernardino Torres M.D.;  Location: SURGERY SAME DAY [facility];  Service: General    BLOCK EPIDURAL STEROID INJECTION Right 5/31/2022    Procedure: RIGHT L4-5 and L5-S1 transforaminal epidural steroid injection with fluoroscopic guidance.;  Surgeon: Indira Lee M.D.;  Location: [facility];  Service: Pain Management    BLOCK EPIDURAL STEROID INJECTION Right 05/10/2022    Procedure: Lumbar L4-5 interlaminar epidural steroid injection;  Surgeon: Indira Lee M.D.;  Location: [facility];  Service: Pain Management    WIDE EXCISION, LESION, HEAD AND NECK REGION Left 03/29/2022    Procedure: WIDE EXCISION, LESION, HEAD AND NECK REGION - RADICAL MALIGNANT MELANOMA OF LEFT CHEST;  Surgeon: Bernardino Torres M.D.;  Location: SURGERY SAME DAY [facility];  Service: General    LUMBAR MEDIAL BRANCH BLOCKS Bilateral 12/15/2020    Procedure: BLOCK, NERVE, SPINAL, LUMBAR, POSTERIOR RAMUS, MEDIAL BRANCH;  Surgeon: Chris Cooper M.D.;  Location: [facility];  Service: Pain Management    IRRIGATION & DEBRIDEMENT ORTHO Bilateral 07/31/2020    Procedure: IRRIGATION AND DEBRIDEMENT, WOUND - KNEE;  Surgeon: Roosevelt Saucedo M.D.;  Location: [facility];   Service: Orthopedics    HEMICOLECTOMY Right 12/13/2018    Procedure: OPEN RIGHT HEMICOLECTOMY;  Surgeon: Dash Bhagat M.D.;  Location: SURGERY [facility];  Service: General    INGUINAL HERNIA REPAIR Right 09/23/2016    Procedure: INGUINAL HERNIA REPAIR - PRIMARY;  Surgeon: Mary Medina M.D.;  Location: SURGERY [facility];  Service:     OTHER  08/12/2014    peroneal nerve surgery Dr. Castro     OTHER ORTHOPEDIC SURGERY  2011    meniscus repair    ARTHROPLASTY Left     hip replacement    CHOLECYSTECTOMY      HYSTERECTOMY, TOTAL ABDOMINAL  1970's    KNEE ARTHROPLASTY TOTAL Left     ROTATOR CUFF REPAIR Bilateral        No current facility-administered medications for this encounter.        Social History     Socioeconomic History    Marital status:      Spouse name: Not on file    Number of children: Not on file    Years of education: Not on file    Highest education level: Not on file   Occupational History    Not on file   Tobacco Use    Smoking status: Never    Smokeless tobacco: Never   Vaping Use    Vaping Use: Never used   Substance and Sexual Activity    Alcohol use: Not Currently     Comment: very rare    Drug use: Not Currently    Sexual activity: Not Currently     Partners: Male     Comment:     Other Topics Concern    Not on file   Social History Narrative    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      Social Determinants of Health     Financial Resource Strain: Not on file   Food Insecurity: Not on file   Transportation Needs: Not on file   Physical Activity: Not on file   Stress: Not on file   Social Connections: Not on file   Intimate Partner Violence: Not on file   Housing Stability: Not on file       Family History   Problem Relation Age of Onset    Cancer Mother         stomach- ruptured appendix    Cancer Father         colon    Leukemia Father         many exposure, worked for [employer] Brother         s/p stent      Other Son         on blood thinner, unclear etiology    Clotting Disorder Neg Hx        Allergies:  Wound dressing adhesive    Review of Systems:  Negative except for back pain, chronic    Physical Exam  Constitutional:       General: She is not in acute distress.     Appearance: She is well-developed. She is not diaphoretic.   HENT:      Head: Normocephalic and atraumatic.      Mouth/Throat:      Pharynx: No oropharyngeal exudate.   Eyes:      General: No scleral icterus.        Right eye: No discharge.         Left eye: No discharge.   Cardiovascular:      Rate and Rhythm: Normal rate.   Pulmonary:      Effort: Pulmonary effort is normal.   Abdominal:      Palpations: Abdomen is soft.   Musculoskeletal:      Cervical back: Neck supple.   Skin:     General: Skin is warm and dry.   Neurological:      Mental Status: She is alert and oriented to person, place, and time.   Psychiatric:         Behavior: Behavior normal.         Thought Content: Thought content normal.         Judgment: Judgment normal.       Vital Signs  Blood Pressure : (!) 190/88   Temperature: 36.2 °C (97.1 °F)   Pulse: 87   Respiration: 16   Pulse Oximetry: 96 %              Assessment and Plan:This is a 87 y.o. here for T12 VB biopsy

## 2023-01-09 NOTE — PROGRESS NOTES
Med rec updated and complete, per pt   Allergies reviewed, per pt  Pt gets her OPDIVO every 14 days from [facility] ([phone]), last dose 1/5/2023

## 2023-01-09 NOTE — OR NURSING
Received phone handover from PACU RN Isaías. As per hand off pt will go home and will continue anticoagulants as prescribed, a/w next plan of surgery. Bed rest for two hours. Came to PACU [id number]

## 2023-01-09 NOTE — OR NURSING
Report called to Clare LARA. Plan of care discussed. Patient denies pain or nausea. 2 hour bedrest in progress. Patient updated on plan of care. VSS.

## 2023-01-09 NOTE — PROGRESS NOTES
IR Nursing Note    IVC Filter Removal by MD Kirk assisted by RT Prieto, right IJ venous access site.  Patient was consented by MD and confirmed by this RN. Procedure site was marked by MD and verified using imaging guidance.  Patient was placed in a supine position.  Patient was prepped and draped in a sterile fashion. Vitals were taken every 5 minutes and remained stable during procedure (see doc flow sheet for results).  CO2 waveform capnography was monitored and remained 26-33 throughout procedure.  A gauze and tegaderm dressing placed over surgical site.     MD unable to remove   Report given to MARCO Metz.  Patient transported to [address] via IR RN monitored then transferred care to report RN.

## 2023-01-10 ENCOUNTER — TELEPHONE (OUTPATIENT)
Dept: VASCULAR LAB | Facility: MEDICAL CENTER | Age: 88
End: 2023-01-10
Payer: MEDICARE

## 2023-01-10 NOTE — TELEPHONE ENCOUNTER
Pt had failed IVC filter removal attempt due to clot above filter possibly adhering to vessel wall.  IR recommends either further anticoagulant treatment or leave in place indefinitely.  MA to call pt to offer appt to review filter options moving forward.     Hannah Guidry [facility]

## 2023-01-13 ENCOUNTER — OFFICE VISIT (OUTPATIENT)
Dept: VASCULAR LAB | Facility: MEDICAL CENTER | Age: 88
End: 2023-01-13
Attending: FAMILY MEDICINE
Payer: MEDICARE

## 2023-01-13 VITALS
HEIGHT: 63 IN | SYSTOLIC BLOOD PRESSURE: 155 MMHG | BODY MASS INDEX: 26.58 KG/M2 | WEIGHT: 150 LBS | RESPIRATION RATE: 14 BRPM | HEART RATE: 85 BPM | DIASTOLIC BLOOD PRESSURE: 82 MMHG

## 2023-01-13 DIAGNOSIS — E78.49 OTHER HYPERLIPIDEMIA: ICD-10-CM

## 2023-01-13 DIAGNOSIS — I65.23 CAROTID ATHEROSCLEROSIS, BILATERAL: ICD-10-CM

## 2023-01-13 DIAGNOSIS — I10 PRIMARY HYPERTENSION: ICD-10-CM

## 2023-01-13 DIAGNOSIS — Z15.09 LYNCH SYNDROME: ICD-10-CM

## 2023-01-13 DIAGNOSIS — M05.79 RHEUMATOID ARTHRITIS INVOLVING MULTIPLE SITES WITH POSITIVE RHEUMATOID FACTOR (HCC): ICD-10-CM

## 2023-01-13 DIAGNOSIS — I82.220 IVC THROMBOSIS (HCC): ICD-10-CM

## 2023-01-13 DIAGNOSIS — I82.5Y2 CHRONIC DEEP VEIN THROMBOSIS (DVT) OF PROXIMAL VEIN OF LEFT LOWER EXTREMITY (HCC): ICD-10-CM

## 2023-01-13 DIAGNOSIS — Z95.828 PRESENCE OF IVC FILTER: ICD-10-CM

## 2023-01-13 DIAGNOSIS — Z79.01 CHRONIC ANTICOAGULATION: ICD-10-CM

## 2023-01-13 DIAGNOSIS — D68.59 PROTEIN S DEFICIENCY (HCC): ICD-10-CM

## 2023-01-13 PROCEDURE — 99214 OFFICE O/P EST MOD 30 MIN: CPT | Performed by: FAMILY MEDICINE

## 2023-01-13 PROCEDURE — 99212 OFFICE O/P EST SF 10 MIN: CPT

## 2023-01-13 RX ORDER — AMLODIPINE BESYLATE 5 MG/1
5 TABLET ORAL
Qty: 90 TABLET | Refills: 3 | Status: SHIPPED | OUTPATIENT
Start: 2023-01-13 | End: 2023-02-17

## 2023-01-13 ASSESSMENT — ENCOUNTER SYMPTOMS
PALPITATIONS: 0
SHORTNESS OF BREATH: 0
ORTHOPNEA: 0
CLAUDICATION: 0
HEMOPTYSIS: 0
SPUTUM PRODUCTION: 0
CHILLS: 0
PND: 0
FEVER: 0
WHEEZING: 0
COUGH: 0

## 2023-01-13 ASSESSMENT — FIBROSIS 4 INDEX: FIB4 SCORE: 2.17

## 2023-01-13 NOTE — PROGRESS NOTES
FOLLOW-UP VASCULAR ANTICOAGULATION VISIT  01/13/23   Marry Mathur is a emale who presents   Initially referred by Yvan Shearer PRosaliaARosalia-CRosalia for length of therapy (LOT) determination and management of anticoagulation in context of acute venothromboembolic disease    Subjective      Interval hx/concerns: had failed IVC filter retrieval 1/9/23 possibly related to thrombus at filter site per report.  Noting new HTN elevations, noting some agitation and poor sleep. Feels a bit unsteady    IVC filter:   Date of placement: 9/26/22, failed retrieval 1/9/23   Reason for placement: back surgery , surg-assoc LLE DVT, unable to anticoag  Pending surgeries: none      VTE disease / Anticoagulation:   Date of initiation of anticoagulation: >20yrs   Current symptoms:  Denies current SOB, CP, leg swelling, edema, leg pain, cyanosis of digits, redness of extremities.  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 Yes, Details: multiple   Any family VTE hx? No  _____PROVOKING FACTORS:  Recent surgery ? Yes, Details: back surgery   Recent trauma ? No  Smoker?  reports that she has never smoked. She has never used smokeless tobacco.    Extended travel? No  Other periods of immobility? Yes, Details: related to back surg  Other known or potential risk factors for VTE disease:  no  WOMEN: Hx of cancer or abnormal cancer screenings: no       Any estrogen, testosterone HRT, E2 birth control, tamoxifene, raloxifene: no       Hx of recurrent miscarriages: no  Hypercoaguability work-up completed?  Chart reports Protein S deficiency - patient reports unaware of this diagnosis  "    HTN:  No interval concerns, tolerating meds, no sx.   Home BP los/70s - newer onset   Adherence to current HTN meds: compliant all of the time       Current Outpatient Medications:     amLODIPine, 5 mg, Oral, QHS    tizanidine, 2 mg, Oral, BID, Taking    Multiple Vitamins-Minerals (PRESERVISION AREDS 2 PO), 1 Capsule, Oral, BID, Taking    atorvastatin, 10 mg, Oral, Q EVENING, Taking    Calcium Carbonate, 600 mg, Oral, DAILY, Taking    rivaroxaban, 20 mg, Oral, PM MEAL, Taking    polyethylene glycol/lytes, 17 g, Oral, QDAY PRN, Taking    acetaminophen, 750 mg, Oral, TID PRN, PRN    melatonin, 10 mg, Oral, Nightly, Taking    omeprazole, 20 mg, Oral, DAILY, Taking    vitamin D, 1,000 Units, Oral, QAM, Taking     Social History     Tobacco Use    Smoking status: Never    Smokeless tobacco: Never   Vaping Use    Vaping Use: Never used   Substance Use Topics    Alcohol use: Not Currently     Comment: very rare    Drug use: Not Currently       DIET AND EXERCISE:  Weight Change:stable   BMI Readings from Last 5 Encounters:   23 26.25 kg/m²   23 26.40 kg/m²   22 27.12 kg/m²   22 28.17 kg/m²   12/15/22 28.35 kg/m²     Diet: common adult  Exercise: moderate regular exercise program     Review of Systems   Constitutional:  Negative for chills, fever and malaise/fatigue.   Respiratory:  Negative for cough, hemoptysis, sputum production, shortness of breath and wheezing.    Cardiovascular:  Negative for chest pain, palpitations, orthopnea, claudication, leg swelling and PND.         Objective    Vitals:    23 0841 23 0846   BP: (!) 161/81 (!) 155/82   BP Location: Right arm Right arm   Patient Position: Sitting Sitting   BP Cuff Size: Adult Adult   Pulse: 89 85   Resp: 14    Weight: 68 kg (150 lb)    Height: 1.61 m (5' 3.39\")       BP Readings from Last 20 Encounters:   23 (!) 155/82   23 (!) 176/79   22 (!) 150/90   12/29/22 130/72   12/15/22 108/65   10/06/22 (!) " 145/65   09/28/22 119/75   09/24/22 (!) 168/78   09/08/22 (!) 164/120   08/25/22 124/74   08/22/22 (!) 138/92   08/12/22 (!) 140/84   08/04/22 122/82   08/01/22 132/86   07/12/22 (!) 159/89   07/06/22 122/74   06/30/22 128/80   06/29/22 120/82   06/27/22 (!) 150/70   06/23/22 114/64      Body mass index is 26.25 kg/m².  Physical Exam  Vitals reviewed.   Constitutional:       General: She is not in acute distress.     Appearance: Normal appearance.   HENT:      Head: Normocephalic and atraumatic.   Neck:      Thyroid: No thyroid mass.      Vascular: Normal carotid pulses.      Trachea: Trachea normal.   Cardiovascular:      Rate and Rhythm: Normal rate and regular rhythm.      Chest Wall: PMI is not displaced.      Pulses: Normal pulses.           Carotid pulses are 2+ on the right side and 2+ on the left side.       Radial pulses are 2+ on the right side and 2+ on the left side.        Dorsalis pedis pulses are 2+ on the right side and 2+ on the left side.        Posterior tibial pulses are 2+ on the right side and 2+ on the left side.      Heart sounds: Normal heart sounds.   Pulmonary:      Effort: Pulmonary effort is normal.      Breath sounds: Normal breath sounds.   Musculoskeletal:      Cervical back: Full passive range of motion without pain.      Right lower leg: No edema.      Left lower leg: No edema.   Skin:     General: Skin is warm and dry.      Capillary Refill: Capillary refill takes less than 2 seconds.      Coloration: Skin is not cyanotic.      Nails: There is no clubbing.   Neurological:      General: No focal deficit present.      Mental Status: She is alert and oriented to person, place, and time. Mental status is at baseline.      Cranial Nerves: No cranial nerve deficit.      Coordination: Coordination is intact. Coordination normal.      Gait: Gait is intact. Gait normal.   Psychiatric:         Mood and Affect: Mood normal.         Behavior: Behavior normal.       Lab Results   Component  Value Date    CHOLSTRLTOT 147 09/28/2021    LDL 78 09/28/2021    HDL 51 09/28/2021    TRIGLYCERIDE 92 09/28/2021      No results found for: LIPOPROTA   No results found for: APOB    Lab Results   Component Value Date    PROTHROMBTM 16.0 (H) 12/29/2022    INR 1.31 (H) 12/29/2022       Lab Results   Component Value Date    HBA1C 5.6 09/16/2022      Lab Results   Component Value Date    SODIUM 137 01/05/2023    POTASSIUM 4.6 01/05/2023    CHLORIDE 101 01/05/2023    CO2 23 01/05/2023    GLUCOSE 103 (H) 01/05/2023    BUN 23 (H) 01/05/2023    CREATININE 0.60 01/05/2023    IFAFRICA >60 12/05/2021    IFNOTAFR >60 12/05/2021        Lab Results   Component Value Date    WBC 6.8 12/29/2022    RBC 4.90 12/29/2022    HEMOGLOBIN 13.2 12/29/2022    HEMATOCRIT 42.2 12/29/2022    MCV 86.1 12/29/2022    MCH 26.9 (L) 12/29/2022    MCHC 31.3 (L) 12/29/2022    MPV 12.2 12/29/2022      VASCULAR IMAGING:     Carotid 2018  1.  There mild to moderate atherosclerotic plaque.  Plaque is located in carotid bulbs and proximal internal carotid arteries.  Plaque characterization:  Primarily calcific   2.  There is no evidence of carotid occlusion.   3. Vertebral arteries demonstrate antegrade flow.   4. Diameter reduction in the internal carotid arteries: less than 50%. There is no hemodynamically significant stenosis.    LLE venous 10/2019   Chronic appearing non-occlusive thrombus within the mid femoral vein.    VANESSA 2020  There is no evidence of major arterial disease demonstrated    RLE venous 4/12/21  . No evidence of right lower extremity deep venous thrombosis.    LLE venous 9/25/22   1) Non-occlusive thrombus in the left mid SFV, likely chronic.  Otherwise    no acute DVT appreciated    IVC filter placement 9/26/22  1. Ultrasound and fluoroscopic guided placement of a Argon Option retrievable caval filter in the infrarenal IVC.  The Argon Option retrievable filter is suitable for removal under usual circumstances to 180 days (6 months) and  sometimes up to one year and longer. This filter may also be used as a permanent filter. Please consult Interventional Radiology for filter   removal if indicated.   2. Inferior vena cavagram within normal limits with no evidence of caval thrombus or occlusion.    CTA abd/pelvis 10/3/22  Aorta and vasculature: No dissection. No abdominal aortic aneurysm. No evidence of hemodynamically significant stenosis in the major visceral branches, pelvic arteries or imaged femoral arteries.   1.  Large RIGHT retroperitoneal hematoma without evidence of active arterial extravasation  2.  No evidence of aortic dissection or hemodynamically significant stenosis  3.  LEFT lower quadrant chronic fat necrosis redemonstrated. This is not demonstrably changed.  4.  Prior cholecystectomy with mild biliary dilatation which could be compensatory  5.  Trace RIGHT pleural effusion    IVC filter retrieval 1/9/23  1. Inferior venacavogram demonstrating small amount of tip thrombus   2. Unsuccessful attempted retrieval of infrarenal IVC filter, possibly due to small amount of clot at the filter tip entering into the wall and preventing engagement. This filter could be LEFT in place if clinically appropriate. Further interval of   anticoagulation therapy followed by more aggressive attempts at filter retrieval may be warranted if clinically appropriate.           Medical Decision Making:  Today's Assessment / Status / Plan:     1. Protein S deficiency (HCC)  Referral to Vascular Medicine      2. Chronic anticoagulation  Referral to Vascular Medicine      3. Presence of IVC filter  Referral to Vascular Medicine    CT-CTA ABDOMEN WITH & W/O-POST PROCESS      4. Chronic deep vein thrombosis (DVT) of proximal vein of left lower extremity (HCC)        5. Carotid atherosclerosis, bilateral        6. Other hyperlipidemia        7. Olson syndrome        8. Rheumatoid arthritis involving multiple sites with positive rheumatoid factor (HCC)        9.  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)  CT-CTA ABDOMEN WITH & W/O-POST PROCESS        PATIENT TYPE: Primary Prevention    Etiology of Established CVD if Present:     1) recurrent VTE disease   - chart reports protein S deficiency however pt reports no knowledge of this and there is no off-anticoag prot S levels available for my review on the chart, so this would draw a question as to validity of dx   - no fhx of recurrent VTE   - most recent - chronic LLE fem V DVT as of 9/2022 - stable   Thrombophilia/hypercoag evaluation:  not recommended  - no imaging surveillance needed at this time  - antthrombotic plan as noted below   - could consider prot S level during interruption of xarelto for IVC filter retrieval to help with future decision-making or at any future time when pt is off xarelto for >48h such as prior to any future surgery or other major procedures     2) Aortic stenosis - no symptoms, non progressive  - defer surveillance and mgmt to cardiology     IVC filter, placed 9/26/22  Discussed the risks and benefits of leaving the IVC filter in place versus removing it.  Risks of leaving filter in place, although rare in occurrence, include filter fracture, development of clot above filter causing PE, and/or mobility of filter.  Explained procedure for removal.  Discussed possibility of complications such as inability to remove filter if a thrombosis is found within filter or if it is embedded in the vessel it may be left in place or require specialty surgical or interventional radiology consultation for removal.   Future Surgeries: none   Bleeding complications while on anticoagulation: had 1 episode of retroperitoneal   Hemorrhage  Reviewed options to include leaving indefinitely, review CT IVCgram in 3mo, and vasc surg eval   - failed retrieval 1/9/23 per IR   Decision:   - in 3mo, check CT venogram to assess anatomy and review clot burden and then determine if IR vs  vasc surg can complete retrieval   - reviewed of options including maintenance of filter and retrieval, and through [id number] has opted for retrieval in line with current FDA recommendations for filter retrieval when no longer indicated   - APRN to coordinate with local IR, vascular surgeon, and/or with [facility] IVC filter retrieval program  for more complex cases   - provided review of anatomy and basics of retrieval procedure in addition to a pt educ handout.     ANTITHROMBOTIC THERAPY:  Date of initiation: >20yrs   HAS-BLED bleeding risk calc ([email or web address]): 2 pt, 4.1%, moderate risk   Factors to consider for indefinite OAC: recurrent, ? Prot S deficiency   Last CBC, BMP: reviewed above   Expected duration: reviewed standard of care as per Chest 2021 guidelines is 3-6 months minimum on full dose OAC.  After review of risks/benefits/alternatives, through shared decision-making, we will continue indefinitely xarelto 20mg   Antithrombotic therapy plan:  - continue xarelto 20mg daily indefinitely - annual risk/benefit review  - ongoing mgmt per [facility]   - update CBC now and needs ongoing Q6mo surveillance as per [facility] standard  - counseled on signs and symptoms of acute VTE that require seeking prompt attention in the ED to include shortness of breath, chest pain, pain with deep inhalation, acute leg swelling and/or pain in calf or leg   - elevate legs as much as possible, use compression stockings/socks if directed by your provider  - Avoid hormonal therapies including estrogen or testosterone-containing meds, or raloxifene or tamoxifene (commonly used for osteoporosis)  - Avoid sedentary periods  - continue complete avoidance of tobacco products  - if having any invasive procedure,please make sure the doctor knows of your history of blood clots and current anticoagulation status  - avoid Aspirin and anti-inflammatories (eg. Advil, ibuprofen, Aleve, naproxen, etc) while anticoagulated   - avoid  skiing or other dangerous activities to reduce risk of head injury and brain bleeds  - recommended to see your PCP to discuss if you need age-appropriate cancer screenings as a small % of blood clots may be caused by an underlying malignancy  - if any bleeding lasting 30min without stopping, please seek care with your PCP, urgent care, or ED  - reversal agents for most blood thinners are now available and used if you have major bleeding    LIPID MANAGEMENT:   Qualifies for Statin Therapy Based on 2018 ACC/AHA Guidelines: yes, no guidelines for >74yo primary prevention  The ASCVD Risk score (Stacie BARR, et al., 2019) failed to calculate., N/A  Major ASCVD events: None  High-risk conditions: N/A  Risk-enhancers: N/A  Currently on Statin: Yes  Tx goals: LDL-C <100 (consider non-HDL-C <130, apoB <90), reasonable   At goal? Yes, 9/2021   Plan:   - reinforced ongoing TLC measures as noted   - monitor labs   Meds:   - continue atorva 20mg daily     BLOOD PRESSURE MANAGEMENT:  ACC/AHA (2017) goal <130/80  Home BP at goal: yes  Office BP at goal:  no, new sx   Plan:   - continue healthy diet, activity, weight mgmt   Monitoring:   - routine clinic-based BP measurements at least once annually   Medications:   - start amlodipine 5mg QHS   - f/u 4-6 weeks     GLYCEMIC STATUS:  Normal    LIFESTYLE INTERVENTIONS:    SMOKING:    reports that she has never smoked. She has never used smokeless tobacco.   - continued complete avoidance of all tobacco products     PHYSICAL ACTIVITY: continue healthy activity to improve CV fitness.  In general, targeting >150min/week of moderate-level activity or as much as tolerated in light of functional status and co-morbidities     WEIGHT MANAGEMENT AND NUTRITION: Mediterranean style dietary approach       OTHER:    # s/p back-surgery- stable, continue PT and f/u with back surg     # anemia- resolved 12/2022    Instructed to follow-up with PCP for remainder of adult medical needs: yes  We will  partner with other providers in the management of established vascular disease and cardiometabolic risk factors.    Studies to Be Obtained: IVC filter retrieval    Labs to Be Obtained: as noted above     Follow up in: 4-6 weeks     Toney Guirdy M.D.  [facility]   [facility]   [phone]

## 2023-01-20 ENCOUNTER — TELEPHONE (OUTPATIENT)
Dept: CARDIOLOGY | Facility: MEDICAL CENTER | Age: 88
End: 2023-01-20
Payer: MEDICARE

## 2023-01-21 NOTE — TELEPHONE ENCOUNTER
Reviewing surveillance, patient was to have consult with Dr. Serrano in October 2022, but cancelled appointment.     Called and spoke to patient. Appointment scheduled with Dr. Serrano.

## 2023-02-01 ENCOUNTER — HOSPITAL ENCOUNTER (OUTPATIENT)
Facility: MEDICAL CENTER | Age: 88
End: 2023-02-01
Attending: INTERNAL MEDICINE
Payer: MEDICARE

## 2023-02-01 LAB
ALBUMIN SERPL BCP-MCNC: 4.8 G/DL (ref 3.2–4.9)
ALBUMIN/GLOB SERPL: 1.7 G/DL
ALP SERPL-CCNC: 71 U/L (ref 30–99)
ALT SERPL-CCNC: 17 U/L (ref 2–50)
ANION GAP SERPL CALC-SCNC: 11 MMOL/L (ref 7–16)
AST SERPL-CCNC: 21 U/L (ref 12–45)
BILIRUB SERPL-MCNC: 0.5 MG/DL (ref 0.1–1.5)
BUN SERPL-MCNC: 29 MG/DL (ref 8–22)
CALCIUM ALBUM COR SERPL-MCNC: 8.5 MG/DL (ref 8.5–10.5)
CALCIUM SERPL-MCNC: 9.1 MG/DL (ref 8.5–10.5)
CHLORIDE SERPL-SCNC: 103 MMOL/L (ref 96–112)
CO2 SERPL-SCNC: 25 MMOL/L (ref 20–33)
CREAT SERPL-MCNC: 0.66 MG/DL (ref 0.5–1.4)
GFR SERPLBLD CREATININE-BSD FMLA CKD-EPI: 85 ML/MIN/1.73 M 2
GLOBULIN SER CALC-MCNC: 2.8 G/DL (ref 1.9–3.5)
GLUCOSE SERPL-MCNC: 106 MG/DL (ref 65–99)
POTASSIUM SERPL-SCNC: 4.6 MMOL/L (ref 3.6–5.5)
PROT SERPL-MCNC: 7.6 G/DL (ref 6–8.2)
SODIUM SERPL-SCNC: 139 MMOL/L (ref 135–145)
TSH SERPL DL<=0.005 MIU/L-ACNC: 1.17 UIU/ML (ref 0.38–5.33)

## 2023-02-01 PROCEDURE — 84443 ASSAY THYROID STIM HORMONE: CPT

## 2023-02-01 PROCEDURE — 80053 COMPREHEN METABOLIC PANEL: CPT

## 2023-02-09 ENCOUNTER — HOSPITAL ENCOUNTER (OUTPATIENT)
Dept: RADIOLOGY | Facility: MEDICAL CENTER | Age: 88
End: 2023-02-09
Attending: PHYSICAL MEDICINE & REHABILITATION
Payer: MEDICARE

## 2023-02-09 DIAGNOSIS — M25.511 RIGHT SHOULDER PAIN, UNSPECIFIED CHRONICITY: ICD-10-CM

## 2023-02-09 PROCEDURE — 73030 X-RAY EXAM OF SHOULDER: CPT | Mod: RT

## 2023-02-14 ENCOUNTER — APPOINTMENT (RX ONLY)
Dept: URBAN - METROPOLITAN AREA CLINIC 22 | Facility: CLINIC | Age: 88
Setting detail: DERMATOLOGY
End: 2023-02-14

## 2023-02-14 DIAGNOSIS — L57.0 ACTINIC KERATOSIS: ICD-10-CM

## 2023-02-14 DIAGNOSIS — Z85.828 PERSONAL HISTORY OF OTHER MALIGNANT NEOPLASM OF SKIN: ICD-10-CM

## 2023-02-14 DIAGNOSIS — Z85.820 PERSONAL HISTORY OF MALIGNANT MELANOMA OF SKIN: ICD-10-CM

## 2023-02-14 DIAGNOSIS — L81.4 OTHER MELANIN HYPERPIGMENTATION: ICD-10-CM

## 2023-02-14 DIAGNOSIS — D22 MELANOCYTIC NEVI: ICD-10-CM

## 2023-02-14 DIAGNOSIS — Z71.89 OTHER SPECIFIED COUNSELING: ICD-10-CM

## 2023-02-14 DIAGNOSIS — D18.0 HEMANGIOMA: ICD-10-CM

## 2023-02-14 DIAGNOSIS — Z86.006 PERSONAL HISTORY OF MELANOMA IN-SITU: ICD-10-CM

## 2023-02-14 DIAGNOSIS — L82.1 OTHER SEBORRHEIC KERATOSIS: ICD-10-CM

## 2023-02-14 PROBLEM — D22.5 MELANOCYTIC NEVI OF TRUNK: Status: ACTIVE | Noted: 2023-02-14

## 2023-02-14 PROBLEM — D18.01 HEMANGIOMA OF SKIN AND SUBCUTANEOUS TISSUE: Status: ACTIVE | Noted: 2023-02-14

## 2023-02-14 PROCEDURE — ? DIAGNOSIS COMMENT

## 2023-02-14 PROCEDURE — ? LIQUID NITROGEN

## 2023-02-14 PROCEDURE — ? SUNSCREEN RECOMMENDATIONS

## 2023-02-14 PROCEDURE — ? COUNSELING

## 2023-02-14 PROCEDURE — 99213 OFFICE O/P EST LOW 20 MIN: CPT | Mod: 25

## 2023-02-14 PROCEDURE — 17004 DESTROY PREMAL LESIONS 15/>: CPT

## 2023-02-14 ASSESSMENT — LOCATION SIMPLE DESCRIPTION DERM
LOCATION SIMPLE: LEFT PRETIBIAL REGION
LOCATION SIMPLE: RIGHT EYELID
LOCATION SIMPLE: RIGHT CHEEK
LOCATION SIMPLE: RIGHT POSTERIOR UPPER ARM
LOCATION SIMPLE: RIGHT TEMPLE
LOCATION SIMPLE: RIGHT PRETIBIAL REGION
LOCATION SIMPLE: LEFT ZYGOMA
LOCATION SIMPLE: CHEST
LOCATION SIMPLE: ABDOMEN
LOCATION SIMPLE: RIGHT HAND
LOCATION SIMPLE: SCALP
LOCATION SIMPLE: LEFT CHEEK
LOCATION SIMPLE: POSTERIOR SCALP
LOCATION SIMPLE: RIGHT FOREARM
LOCATION SIMPLE: LEFT NOSE
LOCATION SIMPLE: NOSE
LOCATION SIMPLE: NECK
LOCATION SIMPLE: LEFT FOREARM
LOCATION SIMPLE: RIGHT UPPER BACK
LOCATION SIMPLE: LEFT UPPER BACK
LOCATION SIMPLE: POSTERIOR NECK
LOCATION SIMPLE: LEFT HAND
LOCATION SIMPLE: RIGHT LIP
LOCATION SIMPLE: RIGHT NOSE
LOCATION SIMPLE: RIGHT ZYGOMA
LOCATION SIMPLE: LEFT EYEBROW
LOCATION SIMPLE: LEFT CLAVICULAR SKIN
LOCATION SIMPLE: LEFT FOREHEAD
LOCATION SIMPLE: RIGHT CLAVICULAR SKIN
LOCATION SIMPLE: RIGHT ANTERIOR NECK

## 2023-02-14 ASSESSMENT — LOCATION ZONE DERM
LOCATION ZONE: HAND
LOCATION ZONE: LIP
LOCATION ZONE: NOSE
LOCATION ZONE: EYELID
LOCATION ZONE: FACE
LOCATION ZONE: NECK
LOCATION ZONE: ARM
LOCATION ZONE: TRUNK
LOCATION ZONE: SCALP
LOCATION ZONE: LEG

## 2023-02-14 ASSESSMENT — LOCATION DETAILED DESCRIPTION DERM
LOCATION DETAILED: RIGHT NASAL ALA
LOCATION DETAILED: RIGHT UPPER CUTANEOUS LIP
LOCATION DETAILED: LEFT SUPERIOR FOREHEAD
LOCATION DETAILED: RIGHT INFERIOR ANTERIOR NECK
LOCATION DETAILED: LEFT MEDIAL ZYGOMA
LOCATION DETAILED: RIGHT CLAVICULAR SKIN
LOCATION DETAILED: RIGHT MEDIAL SUPERIOR CHEST
LOCATION DETAILED: LEFT CLAVICULAR SKIN
LOCATION DETAILED: RIGHT DISTAL DORSAL FOREARM
LOCATION DETAILED: LEFT INFERIOR CENTRAL MALAR CHEEK
LOCATION DETAILED: LEFT INFERIOR LATERAL FOREHEAD
LOCATION DETAILED: LEFT MEDIAL MALAR CHEEK
LOCATION DETAILED: RIGHT LATERAL SUPERIOR CHEST
LOCATION DETAILED: LEFT RADIAL DORSAL HAND
LOCATION DETAILED: RIGHT MEDIAL MALAR CHEEK
LOCATION DETAILED: LEFT LATERAL MALAR CHEEK
LOCATION DETAILED: LEFT NASAL ALA
LOCATION DETAILED: RIGHT MID PREAURICULAR CHEEK
LOCATION DETAILED: LEFT DISTAL PRETIBIAL REGION
LOCATION DETAILED: LEFT LATERAL EYEBROW
LOCATION DETAILED: POSTERIOR MID-PARIETAL SCALP
LOCATION DETAILED: LEFT INFERIOR UPPER BACK
LOCATION DETAILED: NASAL SUPRATIP
LOCATION DETAILED: RIGHT CENTRAL LATERAL NECK
LOCATION DETAILED: RIGHT RADIAL DORSAL HAND
LOCATION DETAILED: LEFT CENTRAL POSTAURICULAR SKIN
LOCATION DETAILED: RIGHT PROXIMAL LATERAL POSTERIOR UPPER ARM
LOCATION DETAILED: RIGHT MEDIAL CANTHUS
LOCATION DETAILED: RIGHT MEDIAL ZYGOMA
LOCATION DETAILED: LEFT LATERAL SUPERIOR CHEST
LOCATION DETAILED: RIGHT LATERAL TEMPLE
LOCATION DETAILED: LEFT LATERAL ABDOMEN
LOCATION DETAILED: RIGHT MID-UPPER BACK
LOCATION DETAILED: LEFT DISTAL DORSAL FOREARM
LOCATION DETAILED: LEFT PROXIMAL DORSAL FOREARM
LOCATION DETAILED: LEFT DORSAL THUMB METACARPOPHALANGEAL JOINT
LOCATION DETAILED: LEFT MEDIAL TRAPEZIAL NECK
LOCATION DETAILED: RIGHT DISTAL PRETIBIAL REGION
LOCATION DETAILED: LEFT SUPERIOR PARIETAL SCALP
LOCATION DETAILED: NASAL DORSUM

## 2023-02-14 NOTE — HPI: MELANOMA F/U (HISTORY OF MALIGNANT MELANOMA)
What Is The Reason For Today's Visit?: History of Melanoma
Breslow Depth?: 1.1
Year Excised?: 2018, 2022

## 2023-02-16 ENCOUNTER — HOSPITAL ENCOUNTER (OUTPATIENT)
Dept: RADIOLOGY | Facility: MEDICAL CENTER | Age: 88
End: 2023-02-16
Attending: PHYSICAL MEDICINE & REHABILITATION
Payer: MEDICARE

## 2023-02-16 DIAGNOSIS — M54.2 CERVICALGIA: ICD-10-CM

## 2023-02-16 PROCEDURE — 72141 MRI NECK SPINE W/O DYE: CPT

## 2023-02-17 ENCOUNTER — OFFICE VISIT (OUTPATIENT)
Dept: VASCULAR LAB | Facility: MEDICAL CENTER | Age: 88
End: 2023-02-17
Attending: FAMILY MEDICINE
Payer: MEDICARE

## 2023-02-17 VITALS
SYSTOLIC BLOOD PRESSURE: 118 MMHG | DIASTOLIC BLOOD PRESSURE: 68 MMHG | HEIGHT: 63 IN | HEART RATE: 71 BPM | BODY MASS INDEX: 26.58 KG/M2 | WEIGHT: 150 LBS

## 2023-02-17 DIAGNOSIS — D68.59 PROTEIN S DEFICIENCY (HCC): ICD-10-CM

## 2023-02-17 DIAGNOSIS — Z79.01 LONG TERM (CURRENT) USE OF ANTICOAGULANTS: ICD-10-CM

## 2023-02-17 DIAGNOSIS — I65.23 CAROTID ATHEROSCLEROSIS, BILATERAL: ICD-10-CM

## 2023-02-17 DIAGNOSIS — I82.5Y2 CHRONIC DEEP VEIN THROMBOSIS (DVT) OF PROXIMAL VEIN OF LEFT LOWER EXTREMITY (HCC): ICD-10-CM

## 2023-02-17 DIAGNOSIS — I10 PRIMARY HYPERTENSION: ICD-10-CM

## 2023-02-17 DIAGNOSIS — E78.49 OTHER HYPERLIPIDEMIA: ICD-10-CM

## 2023-02-17 PROCEDURE — 99214 OFFICE O/P EST MOD 30 MIN: CPT

## 2023-02-17 PROCEDURE — 99212 OFFICE O/P EST SF 10 MIN: CPT

## 2023-02-17 RX ORDER — GABAPENTIN 300 MG/1
300 CAPSULE ORAL 3 TIMES DAILY
Status: ON HOLD | COMMUNITY

## 2023-02-17 RX ORDER — LOSARTAN POTASSIUM 50 MG/1
50 TABLET ORAL DAILY
Qty: 30 TABLET | Refills: 3 | Status: SHIPPED | OUTPATIENT
Start: 2023-02-17 | End: 2023-02-17

## 2023-02-17 ASSESSMENT — ENCOUNTER SYMPTOMS
BLURRED VISION: 0
COUGH: 0
DIZZINESS: 0
BACK PAIN: 1
PALPITATIONS: 0
FEVER: 0
PND: 0
FOCAL WEAKNESS: 0
BLOOD IN STOOL: 0
HEADACHES: 0
SPEECH CHANGE: 0
NECK PAIN: 1
SHORTNESS OF BREATH: 0
CLAUDICATION: 0
DOUBLE VISION: 0
WHEEZING: 0
SPUTUM PRODUCTION: 0
HEMOPTYSIS: 0
ORTHOPNEA: 0
CHILLS: 0

## 2023-02-17 ASSESSMENT — FIBROSIS 4 INDEX: FIB4 SCORE: 1.99

## 2023-02-17 NOTE — PROGRESS NOTES
"FOLLOW-UP VASCULAR ANTICOAGULATION VISIT  02/17/23   Marry Mathur is a 86 yo female who presents for vascular follow up, HTN and anitcoagulation   Initially referred by Yvan Shearer P.ARosalia-C. for length of therapy (LOT) determination and management of anticoagulation in context of acute venothromboembolic disease    Subjective      Interval hx/concerns:    Home BPs /50-80s over the last month.  With more recent readings 105-140s/70-80s.  Some lightheadedness with BPs 90-100s.    Denies HA, vision changes, CP, SOB, palpitations.    Has leg/feet swelling since starting amlodipine.  Increased pain in feet with swelling- PCP has restarted gabapentin without improvement yet  Has not been sleeping well due to back/neck pain.  Working with PT  Concerned about what the \"next steps\" would be for the filter  Compliant with meds    IVC filter:   Date of placement: 9/26/22, failed retrieval 1/9/23   Reason for placement: back surgery , surg-assoc LLE DVT, unable to anticoag  Pending surgeries: none    had failed IVC filter retrieval 1/9/23 possibly related to thrombus at filter site per report    VTE disease / Anticoagulation: (reviewed from prior)  Date of initiation of anticoagulation: >20yrs   Current symptoms:  Denies current SOB, CP, leg swelling, edema, leg pain, cyanosis of digits, redness of extremities.  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 Yes, Details: multiple   Any family VTE hx? No  _____PROVOKING FACTORS:  Recent surgery ? Yes, Details: back surgery   Recent trauma ? No  Smoker?  reports that she has never smoked. She has never used " smokeless tobacco.    Extended travel? No  Other periods of immobility? Yes, Details: related to back surg  Other known or potential risk factors for VTE disease:  no  WOMEN: Hx of cancer or abnormal cancer screenings: no       Any estrogen, testosterone HRT, E2 birth control, tamoxifene, raloxifene: no       Hx of recurrent miscarriages: no  Hypercoaguability work-up completed?  Chart reports Protein S deficiency - patient reports unaware of this diagnosis            Current Outpatient Medications:     gabapentin, 300 mg, Oral, QDAY PRN    Multiple Vitamins-Minerals (PRESERVISION AREDS 2 PO), 1 Capsule, Oral, BID, Taking    atorvastatin, 10 mg, Oral, Q EVENING, Taking    Calcium Carbonate, 600 mg, Oral, DAILY, Taking    rivaroxaban, 20 mg, Oral, PM MEAL, Taking    polyethylene glycol/lytes, 17 g, Oral, QDAY PRN, Taking    acetaminophen, 750 mg, Oral, TID PRN, Taking    melatonin, 10 mg, Oral, Nightly, Taking    omeprazole, 20 mg, Oral, DAILY, Taking    vitamin D, 1,000 Units, Oral, QAM, Taking    losartan, 50 mg, Oral, DAILY     Social History     Tobacco Use    Smoking status: Never    Smokeless tobacco: Never   Vaping Use    Vaping Use: Never used   Substance Use Topics    Alcohol use: Not Currently     Comment: very rare    Drug use: Not Currently       DIET AND EXERCISE:  Weight Change:stable   BMI Readings from Last 5 Encounters:   02/17/23 26.57 kg/m²   01/13/23 26.25 kg/m²   01/09/23 26.40 kg/m²   12/29/22 27.12 kg/m²   12/29/22 28.17 kg/m²     Diet: common adult  Exercise: moderate regular exercise program     Review of Systems   Constitutional:  Negative for chills, fever and malaise/fatigue.   HENT:  Negative for nosebleeds.    Eyes:  Negative for blurred vision and double vision.   Respiratory:  Negative for cough, hemoptysis, sputum production, shortness of breath and wheezing.    Cardiovascular:  Negative for chest pain, palpitations, orthopnea, claudication, leg swelling and PND.   Gastrointestinal:   "Negative for blood in stool and melena.   Genitourinary:  Negative for hematuria.   Musculoskeletal:  Positive for back pain (chronic), joint pain (chronic) and neck pain (chronic).   Neurological:  Negative for dizziness, speech change, focal weakness and headaches.         Objective    Vitals:    02/17/23 1047   BP: 118/68   BP Location: Left arm   Patient Position: Sitting   BP Cuff Size: Adult   Pulse: 71   Weight: 68 kg (150 lb)   Height: 1.6 m (5' 3\")      BP Readings from Last 20 Encounters:   02/17/23 118/68   01/13/23 (!) 155/82   01/09/23 (!) 176/79   12/29/22 (!) 150/90   12/29/22 130/72   12/15/22 108/65   10/06/22 (!) 145/65   09/28/22 119/75   09/24/22 (!) 168/78   09/08/22 (!) 164/120   08/25/22 124/74   08/22/22 (!) 138/92   08/12/22 (!) 140/84   08/04/22 122/82   08/01/22 132/86   07/12/22 (!) 159/89   07/06/22 122/74   06/30/22 128/80   06/29/22 120/82   06/27/22 (!) 150/70      Body mass index is 26.57 kg/m².  Physical Exam  Vitals reviewed.   Constitutional:       General: She is not in acute distress.     Appearance: Normal appearance. She is not diaphoretic.   HENT:      Head: Normocephalic and atraumatic.   Eyes:      General: No scleral icterus.     Conjunctiva/sclera: Conjunctivae normal.   Neck:      Trachea: Trachea normal.   Cardiovascular:      Rate and Rhythm: Normal rate and regular rhythm.      Pulses: Normal pulses.           Posterior tibial pulses are 2+ on the right side and 2+ on the left side.      Heart sounds: Murmur heard.   Pulmonary:      Effort: Pulmonary effort is normal. No respiratory distress.      Breath sounds: Normal breath sounds. No wheezing or rales.   Musculoskeletal:      Cervical back: Full passive range of motion without pain.      Right lower leg: No edema.      Left lower leg: No edema.   Skin:     General: Skin is warm and dry.      Capillary Refill: Capillary refill takes less than 2 seconds.      Coloration: Skin is not cyanotic or pale.      Nails: " There is no clubbing.   Neurological:      General: No focal deficit present.      Mental Status: She is alert and oriented to person, place, and time. Mental status is at baseline.      Coordination: Coordination is intact. Coordination normal.      Gait: Gait is intact. Gait normal.   Psychiatric:         Mood and Affect: Mood normal.         Behavior: Behavior normal.         Thought Content: Thought content normal.       Lab Results   Component Value Date    CHOLSTRLTOT 147 09/28/2021    LDL 78 09/28/2021    HDL 51 09/28/2021    TRIGLYCERIDE 92 09/28/2021      No results found for: LIPOPROTA   No results found for: APOB    Lab Results   Component Value Date    PROTHROMBTM 16.0 (H) 12/29/2022    INR 1.31 (H) 12/29/2022       Lab Results   Component Value Date    HBA1C 5.6 09/16/2022      Lab Results   Component Value Date    SODIUM 139 02/01/2023    POTASSIUM 4.6 02/01/2023    CHLORIDE 103 02/01/2023    CO2 25 02/01/2023    GLUCOSE 106 (H) 02/01/2023    BUN 29 (H) 02/01/2023    CREATININE 0.66 02/01/2023    IFAFRICA >60 12/05/2021    IFNOTAFR >60 12/05/2021        Lab Results   Component Value Date    WBC 6.8 12/29/2022    RBC 4.90 12/29/2022    HEMOGLOBIN 13.2 12/29/2022    HEMATOCRIT 42.2 12/29/2022    MCV 86.1 12/29/2022    MCH 26.9 (L) 12/29/2022    MCHC 31.3 (L) 12/29/2022    MPV 12.2 12/29/2022      VASCULAR IMAGING:     Carotid 2018  1.  There mild to moderate atherosclerotic plaque.  Plaque is located in carotid bulbs and proximal internal carotid arteries.  Plaque characterization:  Primarily calcific   2.  There is no evidence of carotid occlusion.   3. Vertebral arteries demonstrate antegrade flow.   4. Diameter reduction in the internal carotid arteries: less than 50%. There is no hemodynamically significant stenosis.    LLE venous 10/2019   Chronic appearing non-occlusive thrombus within the mid femoral vein.    VANESSA 2020  There is no evidence of major arterial disease demonstrated    RLE venous  4/12/21  . No evidence of right lower extremity deep venous thrombosis.    LLE venous 9/25/22   1) Non-occlusive thrombus in the left mid SFV, likely chronic.  Otherwise    no acute DVT appreciated    IVC filter placement 9/26/22  1. Ultrasound and fluoroscopic guided placement of a Argon Option retrievable caval filter in the infrarenal IVC.  The Argon Option retrievable filter is suitable for removal under usual circumstances to 180 days (6 months) and sometimes up to one year and longer. This filter may also be used as a permanent filter. Please consult Interventional Radiology for filter   removal if indicated.   2. Inferior vena cavagram within normal limits with no evidence of caval thrombus or occlusion.    CTA abd/pelvis 10/3/22  Aorta and vasculature: No dissection. No abdominal aortic aneurysm. No evidence of hemodynamically significant stenosis in the major visceral branches, pelvic arteries or imaged femoral arteries.   1.  Large RIGHT retroperitoneal hematoma without evidence of active arterial extravasation  2.  No evidence of aortic dissection or hemodynamically significant stenosis  3.  LEFT lower quadrant chronic fat necrosis redemonstrated. This is not demonstrably changed.  4.  Prior cholecystectomy with mild biliary dilatation which could be compensatory  5.  Trace RIGHT pleural effusion    IVC filter retrieval 1/9/23  1. Inferior venacavogram demonstrating small amount of tip thrombus   2. Unsuccessful attempted retrieval of infrarenal IVC filter, possibly due to small amount of clot at the filter tip entering into the wall and preventing engagement. This filter could be LEFT in place if clinically appropriate. Further interval of   anticoagulation therapy followed by more aggressive attempts at filter retrieval may be warranted if clinically appropriate.           Medical Decision Making:  Today's Assessment / Status / Plan:     1. Primary hypertension  DISCONTINUED: losartan (COZAAR) 50 MG Tab       2. 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 Long term (current) use of anticoagulants        4. Protein S deficiency (HCC)        5. Carotid atherosclerosis, bilateral        6. Other hyperlipidemia          PATIENT TYPE: Primary Prevention    Etiology of Established CVD if Present:     1) recurrent VTE disease   - chart reports protein S deficiency however pt reports no knowledge of this and there is no off-anticoag prot S levels available for my review on the chart, so this would draw a question as to validity of dx   - no fhx of recurrent VTE   - most recent - chronic LLE fem V DVT as of 9/2022 - stable   Thrombophilia/hypercoag evaluation:  not recommended  - no imaging surveillance needed at this time  - antthrombotic plan as noted below   - could consider prot S level during interruption of xarelto for IVC filter retrieval to help with future decision-making or at any future time when pt is off xarelto for >48h such as prior to any future surgery or other major procedures     2) Aortic stenosis - no symptoms, non progressive  - defer surveillance and mgmt to cardiology     IVC filter, placed 9/26/22  Previously discussed the risks and benefits of leaving the IVC filter in place versus removing it.  Risks of leaving filter in place, although rare in occurrence, include filter fracture, development of clot above filter causing PE, and/or mobility of filter.  Explained procedure for removal.  Discussed possibility of complications such as inability to remove filter if a thrombosis is found within filter or if it is embedded in the vessel it may be left in place or require specialty surgical or interventional radiology consultation for removal.   Future Surgeries: none   Bleeding complications while on anticoagulation: had 1 episode of retroperitoneal   Hemorrhage  Reviewed options to include leaving indefinitely, review CT IVCgram in 3mo, and vasc surg eval    - failed retrieval 1/9/23 per IR   Decision:   - in 3mo, check CT venogram to assess anatomy and review clot burden and then determine if IR vs vasc surg can complete retrieval  - scheduled 4/10/2023  - reviewed of options including maintenance of filter and retrieval, and through [id number] has opted for retrieval in line with current FDA recommendations for filter retrieval when no longer indicated   - APRN to coordinate with local IR, vascular surgeon, and/or with [place] IVC filter retrieval program  for more complex cases   - provided review of anatomy and basics of retrieval procedure in addition to a pt educ handout.     ANTITHROMBOTIC THERAPY:  Date of initiation: >20yrs   HAS-BLED bleeding risk calc ([email or web address]): 2 pt, 4.1%, moderate risk   Factors to consider for indefinite OAC: recurrent, ? Prot S deficiency   Last CBC, BMP: reviewed above   Expected duration: reviewed standard of care as per Chest 2021 guidelines is 3-6 months minimum on full dose OAC.  After review of risks/benefits/alternatives, through shared decision-making, we will continue indefinitely xarelto 20mg   Antithrombotic therapy plan:  - continue xarelto 20mg daily indefinitely - annual risk/benefit review  - ongoing mgmt per [facility]   -  CBC and ongoing Q6mo surveillance as per [facility] standard  - counseled on signs and symptoms of acute VTE that require seeking prompt attention in the ED to include shortness of breath, chest pain, pain with deep inhalation, acute leg swelling and/or pain in calf or leg   - elevate legs as much as possible, use compression stockings/socks if directed by your provider  - Avoid hormonal therapies including estrogen or testosterone-containing meds, or raloxifene or tamoxifene (commonly used for osteoporosis)  - Avoid sedentary periods  - continue complete avoidance of tobacco products  - if having any invasive procedure,please make sure the doctor knows of your history of blood clots and  current anticoagulation status  - avoid Aspirin and anti-inflammatories (eg. Advil, ibuprofen, Aleve, naproxen, etc) while anticoagulated   - avoid skiing or other dangerous activities to reduce risk of head injury and brain bleeds  - recommended to see your PCP to discuss if you need age-appropriate cancer screenings as a small % of blood clots may be caused by an underlying malignancy  - if any bleeding lasting 30min without stopping, please seek care with your PCP, urgent care, or ED  - reversal agents for most blood thinners are now available and used if you have major bleeding    LIPID MANAGEMENT:   Qualifies for Statin Therapy Based on 2018 ACC/AHA Guidelines: yes, no guidelines for >76yo primary prevention  The ASCVD Risk score (Stacie DK, et al., 2019) failed to calculate., N/A  Major ASCVD events: None  High-risk conditions: N/A  Risk-enhancers: N/A  Currently on Statin: Yes  Tx goals: LDL-C <100 (consider non-HDL-C <130, apoB <90), reasonable   At goal? Yes, 9/2021   Plan:   - reinforced ongoing TLC measures as noted   - monitor labs   Meds:   - continue atorva 20mg daily     BLOOD PRESSURE MANAGEMENT:  ACC/AHA (2017) goal <130/80  Home BP at goal: yes  Office BP at goal:  no,   Plan:   - continue healthy diet, activity, weight mgmt   Monitoring:   - routine clinic-based BP measurements at least once annually   Medications:   - stop amlodipine 5mg due to BLE swelling and worsening foot pain after starting  - START losartan 50mg daily   - f/u 8 weeks     GLYCEMIC STATUS:  Normal    LIFESTYLE INTERVENTIONS:    SMOKING:    reports that she has never smoked. She has never used smokeless tobacco.   - continued complete avoidance of all tobacco products     PHYSICAL ACTIVITY: continue healthy activity to improve CV fitness.  In general, targeting >150min/week of moderate-level activity or as much as tolerated in light of functional status and co-morbidities     WEIGHT MANAGEMENT AND NUTRITION: Mediterranean  style dietary approach       OTHER:    # s/p back-surgery- stable, continue PT and f/u with back surg     # anemia- resolved 12/2022    Instructed to follow-up with PCP for remainder of adult medical needs: yes  We will partner with other providers in the management of established vascular disease and cardiometabolic risk factors.    Studies to Be Obtained: IVC filter retrieval, CT IVCgram scheduled 4/10/2023  Labs to Be Obtained: as noted above     Follow up in: 8 weeks after CT to discuss results and check BP     KINSEY Turner.  [facility]   [facility]   [phone]

## 2023-02-28 ENCOUNTER — HOSPITAL ENCOUNTER (OUTPATIENT)
Facility: MEDICAL CENTER | Age: 88
End: 2023-02-28
Attending: INTERNAL MEDICINE
Payer: MEDICARE

## 2023-02-28 LAB
ALBUMIN SERPL BCP-MCNC: 4.3 G/DL (ref 3.2–4.9)
ALBUMIN/GLOB SERPL: 1.7 G/DL
ALP SERPL-CCNC: 58 U/L (ref 30–99)
ALT SERPL-CCNC: 24 U/L (ref 2–50)
ANION GAP SERPL CALC-SCNC: 11 MMOL/L (ref 7–16)
AST SERPL-CCNC: 26 U/L (ref 12–45)
BILIRUB SERPL-MCNC: 0.4 MG/DL (ref 0.1–1.5)
BUN SERPL-MCNC: 24 MG/DL (ref 8–22)
CALCIUM ALBUM COR SERPL-MCNC: 8.5 MG/DL (ref 8.5–10.5)
CALCIUM SERPL-MCNC: 8.7 MG/DL (ref 8.5–10.5)
CHLORIDE SERPL-SCNC: 107 MMOL/L (ref 96–112)
CO2 SERPL-SCNC: 24 MMOL/L (ref 20–33)
CREAT SERPL-MCNC: 0.62 MG/DL (ref 0.5–1.4)
GFR SERPLBLD CREATININE-BSD FMLA CKD-EPI: 86 ML/MIN/1.73 M 2
GLOBULIN SER CALC-MCNC: 2.6 G/DL (ref 1.9–3.5)
GLUCOSE SERPL-MCNC: 85 MG/DL (ref 65–99)
POTASSIUM SERPL-SCNC: 5.3 MMOL/L (ref 3.6–5.5)
PROT SERPL-MCNC: 6.9 G/DL (ref 6–8.2)
SODIUM SERPL-SCNC: 142 MMOL/L (ref 135–145)
TSH SERPL DL<=0.005 MIU/L-ACNC: 1.71 UIU/ML (ref 0.38–5.33)

## 2023-02-28 PROCEDURE — 80053 COMPREHEN METABOLIC PANEL: CPT

## 2023-02-28 PROCEDURE — 84443 ASSAY THYROID STIM HORMONE: CPT

## 2023-03-02 NOTE — ASSESSMENT & PLAN NOTE
Patient needs new orders placed for labs. Please call when placed so she can schedule.    [place] from [place] on [place] and by Monday upon returning she had diarrhea, nausea. She only had a [employer] while on land (in [facility]) but otherwise she didn't eat off of the ship. The diarrhea has now turned black and has a lot of gas. Has explosive gas and liquid diarrhea. Also has abdominal cramping. Denies fevers, chills. Felt nauseous for a bit. Has been drinking shaun tea to help with the nausea. Has been taking kayopectate.

## 2023-03-09 ENCOUNTER — OFFICE VISIT (OUTPATIENT)
Dept: CARDIOLOGY | Facility: MEDICAL CENTER | Age: 88
End: 2023-03-09
Payer: MEDICARE

## 2023-03-09 VITALS
HEART RATE: 75 BPM | HEIGHT: 63 IN | RESPIRATION RATE: 14 BRPM | OXYGEN SATURATION: 98 % | WEIGHT: 149 LBS | DIASTOLIC BLOOD PRESSURE: 72 MMHG | BODY MASS INDEX: 26.4 KG/M2 | SYSTOLIC BLOOD PRESSURE: 130 MMHG

## 2023-03-09 DIAGNOSIS — Z01.810 PREOPERATIVE CARDIOVASCULAR EXAMINATION: ICD-10-CM

## 2023-03-09 DIAGNOSIS — E78.49 OTHER HYPERLIPIDEMIA: ICD-10-CM

## 2023-03-09 DIAGNOSIS — I10 ESSENTIAL HYPERTENSION, BENIGN: ICD-10-CM

## 2023-03-09 DIAGNOSIS — I35.0 NONRHEUMATIC AORTIC VALVE STENOSIS: ICD-10-CM

## 2023-03-09 DIAGNOSIS — Z86.718 HISTORY OF RECURRENT DEEP VEIN THROMBOSIS (DVT): ICD-10-CM

## 2023-03-09 DIAGNOSIS — I45.10 RIGHT BUNDLE BRANCH BLOCK: ICD-10-CM

## 2023-03-09 DIAGNOSIS — Z95.828 PRESENCE OF IVC FILTER: ICD-10-CM

## 2023-03-09 PROCEDURE — 99214 OFFICE O/P EST MOD 30 MIN: CPT | Performed by: INTERNAL MEDICINE

## 2023-03-09 ASSESSMENT — FIBROSIS 4 INDEX: FIB4 SCORE: 1.99

## 2023-03-09 NOTE — PROGRESS NOTES
CARDIOLOGY STRUCTURAL HEART CONSULTATION    PCP: KINSEY Horner.  REFERRING : Dr. Van    1. Nonrheumatic aortic valve stenosis    2. Right bundle branch block    3. Essential hypertension, benign    4. Other hyperlipidemia    5. History of recurrent deep vein thrombosis (DVT)    6. Presence of IVC filter    7. Preoperative cardiovascular examination        Marry Mathur has aortic stenosis with ambiguous symptoms and a murmur suggestive of progressive narrowing.  I advised updating an echocardiogram as well as laboratory profile.  She is being considered for epidural injection and I think this is reasonable to hold Xarelto for 48 hours prior and resume afterwards.  We did discuss the concern with recurrent VTE during this time.  I do advise against any procedures which would require general anesthesia until cardiac evaluation is complete.    Follow up: 3 months    Chief Complaint   Patient presents with    Aortic Stenosis    Hypertension       History: Marry Mathur is a 87 y.o. 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     An echocardiogram of May 2022-personally interpreted by myself-shows aortic valve mean gradient of 33 mmHg, moderate aortic stenosis by visual assessment    ECG from September 2022 shows sinus rhythm with right bundle branch block    She had spine surgery in September with course complicated by DVT and then subsequent PE she has been anticoagulated since.  She has a IVC filter.  She is doing physical therapy but struggles due to pain mostly in the legs and back.  Generally she does feel she has the same energy as she has had in years previous but does endorse chronic chest discomfort.  Lately she has been experiencing arm pain and is contemplating epidural injection into the cervical  "spine.      ROS:   10 point review systems is otherwise negative except as per the HPI    PE:  LMP  (LMP Unknown)   Gen: no acute distress  HEENT: Symmetric face. Anicteric sclerae. Moist mucus membranes  NECK: No JVD. No lymphadenopathy  CARDIAC: Regular, Normal S1, S2, +systolic murmur  VASCULATURE: carotids are normal bilaterally without bruit  RESP: Clear to auscultation bilaterally  ABD: Soft, non-tender, non-distended  EXT: No edema, no clubbing or cyanosis  SKIN: Warm and dry  NEURO: No gross deficits  PSYCH: Appropriate affect, participates in conversation    Past Medical History:   Diagnosis Date    Adenocarcinoma of colon (HCC) 10/30/2018    From sessile serrated polyp 10/2018    Anesthesia     pt states \"one new dr scraped my esophagus when they put the tube in and I started bleeding so they couldn't operate\"     Back pain 06/01/2022    0/10    Blood clotting disorder (HCC) 2012    clot in leg    Bowel habit changes     constipation     Cancer (HCC)     skin, colon 2018    Cataract     belia IOL     Chronic back pain greater than 3 months duration     Deep vein thrombosis (HCC) 03/08/2016    First occurrence in [facility] in late 1970s Second occurrence further up in [facility] in 2012, has been on AC since     Essential hypertension, benign 06/27/2012    GERD (gastroesophageal reflux disease) 06/27/2012    Heart murmur     High cholesterol     Hyperlipidemia     Hypertension     hx of, not currently     Impaired fasting glucose 11/02/2017    Melanoma (HCC)     Mild aortic stenosis     Seeing Dr. Van    Other and unspecified hyperlipidemia 06/27/2012    Prediabetes     Protein S deficiency ([facility]) 11/02/2017    Noted in lab work 2013 as a part of work up at [facility]     Rheumatoid arthritis involving multiple sites with positive rheumatoid factor ([facility]) 03/14/2016    Dr. Escoto    Rheumatoid nodule ([facility]) 07/25/2017    Right bundle branch block 06/27/2012    pt denies     Urinary incontinence 11/02/2017     Past " Surgical History:   Procedure Laterality Date    LUMBAR FUSION O-ARM  9/21/2022    Procedure: L3-4 and L4-5 decompressive laminectomy, bilateral foraminotomies and L3-4-5 posterior lumbar instrumented fusion;  Surgeon: Rosa M Bonner M.D.;  Location: SURGERY [facility];  Service: Neurosurgery    LUMBAR DECOMPRESSION  9/21/2022    Procedure: DECOMPRESSION, SPINE, LUMBAR;  Surgeon: Rosa M Bonner M.D.;  Location: SURGERY [facility];  Service: Neurosurgery    LUMBAR LAMINECTOMY DISKECTOMY  9/21/2022    Procedure: LAMINECTOMY, SPINE, LUMBAR, WITH DISCECTOMY;  Surgeon: Rosa M Bonner M.D.;  Location: SURGERY [facility];  Service: Neurosurgery    FORAMINOTOMY  9/21/2022    Procedure: FORAMINOTOMY, SPINE;  Surgeon: Rosa M Bonner M.D.;  Location: [facility];  Service: Neurosurgery    IN INJECTION,SACROILIAC JOINT Right 7/12/2022    Procedure: RIGHT sacroiliac joint injection with fluoroscopic guidance.;  Surgeon: Indira Lee M.D.;  [facility];  Service: Pain Management    IN REMOVE ARMPITS LYMPH NODES COMPLT Left 6/7/2022    Procedure: LYMPHADENECTOMY, AXILLARY;  Surgeon: Bernardino Torres M.D.;  Location: SURGERY [facility];  Service: General    BLOCK EPIDURAL STEROID INJECTION Right 5/31/2022    Procedure: RIGHT L4-5 and L5-S1 transforaminal epidural steroid injection with fluoroscopic guidance.;  Surgeon: Indira Lee M.D.;  Location: [facility];  Service: Pain Management    BLOCK EPIDURAL STEROID INJECTION Right 05/10/2022    Procedure: Lumbar L4-5 interlaminar epidural steroid injection;  Surgeon: Indira Lee M.D.;  Location: [facility];  Service: Pain Management    WIDE EXCISION, LESION, HEAD AND NECK REGION Left 03/29/2022    Procedure: WIDE EXCISION, LESION, HEAD AND NECK REGION - RADICAL MALIGNANT MELANOMA OF LEFT CHEST;  Surgeon: Bernardino Torres M.D.;  Location: SURGERY [facility];  Service: General     LUMBAR MEDIAL BRANCH BLOCKS Bilateral 12/15/2020    Procedure: BLOCK, NERVE, SPINAL, LUMBAR, POSTERIOR RAMUS, MEDIAL BRANCH;  Surgeon: Chris Cooper M.D.;  Location: [facility];  Service: Pain Management    IRRIGATION & DEBRIDEMENT ORTHO Bilateral 07/31/2020    Procedure: IRRIGATION AND DEBRIDEMENT, WOUND - KNEE;  Surgeon: Roosevelt Saucedo M.D.;  Location: SURGERY [facility];  Service: Orthopedics    HEMICOLECTOMY Right 12/13/2018    Procedure: OPEN RIGHT HEMICOLECTOMY;  Surgeon: Dash Bhagat M.D.;  Location: SURGERY [facility];  Service: General    INGUINAL HERNIA REPAIR Right 09/23/2016    Procedure: INGUINAL HERNIA REPAIR - PRIMARY;  Surgeon: Mary Medina M.D.;  Location: SURGERY [facility];  Service:     OTHER  08/12/2014    peroneal nerve surgery Dr. Castro     OTHER ORTHOPEDIC SURGERY  2011    meniscus repair    ARTHROPLASTY Left     hip replacement    CHOLECYSTECTOMY      HYSTERECTOMY, TOTAL ABDOMINAL  1970's    KNEE ARTHROPLASTY TOTAL Left     ROTATOR CUFF REPAIR Bilateral      Allergies   Allergen Reactions    Wound Dressing Adhesive      Pt says band-aids cause skin reaction/rash if on for more than 2 days  Paper tape OK       Outpatient Encounter Medications as of 3/9/2023   Medication Sig Dispense Refill    gabapentin (NEURONTIN) 300 MG Cap Take 300 mg by mouth 1 time a day as needed.      losartan (COZAAR) 50 MG Tab TAKE 1 TABLET BY MOUTH EVERY DAY 90 Tablet 3    Multiple Vitamins-Minerals (PRESERVISION AREDS 2 PO) Take 1 Capsule by mouth 2 times a day.      atorvastatin (LIPITOR) 10 MG Tab Take 1 Tablet by mouth every evening. 90 Tablet 1    Calcium Carbonate 600 MG Tab Take 600 mg by mouth every day.      rivaroxaban (XARELTO) 20 MG Tab tablet Take 1 Tablet by mouth with dinner. 90 Tablet 3    polyethylene glycol/lytes (MIRALAX) 17 g Pack Take 1 Packet by mouth 1 time a day as needed (if sennosides and docusate ineffective after 24 hours).  3     acetaminophen (TYLENOL) 500 MG Tab Take 750 mg by mouth 3 times a day as needed. Indications: Pain      melatonin 5 mg Tab Take 10 mg by mouth every evening.      omeprazole (PRILOSEC) 20 MG delayed-release capsule Take 1 Capsule by mouth every day. 90 Capsule 3    vitamin D (CHOLECALCIFEROL) 1000 UNIT Tab Take 1,000 Units by mouth every morning.       No facility-administered encounter medications on file as of 3/9/2023.     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      Social Determinants of Health     Financial Resource Strain: Not on file   Food Insecurity: Not on file   Transportation Needs: Not on file   Physical Activity: Not on file   Stress: Not on file   Social Connections: Not on file   Intimate Partner Violence: Not on file   Housing Stability: Not on file     Family History   Problem Relation Age of Onset    Cancer Mother         stomach- ruptured appendix    Cancer Father         colon    Leukemia Father         many exposure, worked for [employer]     Heart Disease Brother         s/p stent     Other Son         on blood thinner, unclear etiology    Clotting Disorder Neg Hx          Studies  Lab Results   Component Value Date/Time    CHOLSTRLTOT 147 09/28/2021 09:20 AM    LDL 78 09/28/2021 09:20 AM    HDL 51 09/28/2021 09:20 AM    TRIGLYCERIDE 92 09/28/2021 09:20 AM       Lab  Results   Component Value Date/Time    SODIUM 139 02/01/2023 01:15 PM    POTASSIUM 4.6 02/01/2023 01:15 PM    CHLORIDE 103 02/01/2023 01:15 PM    CO2 25 02/01/2023 01:15 PM    GLUCOSE 106 (H) 02/01/2023 01:15 PM    BUN 29 (H) 02/01/2023 01:15 PM    CREATININE 0.66 02/01/2023 01:15 PM     Lab Results   Component Value Date/Time    ALKPHOSPHAT 71 02/01/2023 01:15 PM    ASTSGOT 21 02/01/2023 01:15 PM    ALTSGPT 17 02/01/2023 01:15 PM    TBILIRUBIN 0.5 02/01/2023 01:15 PM              For this encounter I directly reviewed Echo images.  Moderate aortic stenosis. I otherwise agree with the interpretation in the EHR.

## 2023-03-16 ENCOUNTER — TELEPHONE (OUTPATIENT)
Dept: CARDIOLOGY | Facility: MEDICAL CENTER | Age: 88
End: 2023-03-16
Payer: MEDICARE

## 2023-03-16 NOTE — TELEPHONE ENCOUNTER
Received fax from [facility] ( F: [phone] ) requesting:    - cardiac clearance for upcoming C7-T1 epidural injection scheduled TBD.    Fax imported to media tab.    Letter drafted with MD recommendations and faxed to above number electronically, completed status

## 2023-03-24 ENCOUNTER — HOSPITAL ENCOUNTER (OUTPATIENT)
Dept: LAB | Facility: MEDICAL CENTER | Age: 88
End: 2023-03-24
Attending: INTERNAL MEDICINE
Payer: MEDICARE

## 2023-03-24 DIAGNOSIS — I35.0 NONRHEUMATIC AORTIC VALVE STENOSIS: ICD-10-CM

## 2023-03-24 LAB
ALBUMIN SERPL BCP-MCNC: 4.1 G/DL (ref 3.2–4.9)
ALBUMIN/GLOB SERPL: 1.3 G/DL
ALP SERPL-CCNC: 59 U/L (ref 30–99)
ALT SERPL-CCNC: 17 U/L (ref 2–50)
ANION GAP SERPL CALC-SCNC: 11 MMOL/L (ref 7–16)
AST SERPL-CCNC: 20 U/L (ref 12–45)
BILIRUB SERPL-MCNC: 0.5 MG/DL (ref 0.1–1.5)
BUN SERPL-MCNC: 24 MG/DL (ref 8–22)
CALCIUM ALBUM COR SERPL-MCNC: 8.5 MG/DL (ref 8.5–10.5)
CALCIUM SERPL-MCNC: 8.6 MG/DL (ref 8.5–10.5)
CHLORIDE SERPL-SCNC: 107 MMOL/L (ref 96–112)
CHOLEST SERPL-MCNC: 176 MG/DL (ref 100–199)
CO2 SERPL-SCNC: 25 MMOL/L (ref 20–33)
CREAT SERPL-MCNC: 0.7 MG/DL (ref 0.5–1.4)
ERYTHROCYTE [DISTWIDTH] IN BLOOD BY AUTOMATED COUNT: 53.8 FL (ref 35.9–50)
FASTING STATUS PATIENT QL REPORTED: NORMAL
GFR SERPLBLD CREATININE-BSD FMLA CKD-EPI: 84 ML/MIN/1.73 M 2
GLOBULIN SER CALC-MCNC: 3.1 G/DL (ref 1.9–3.5)
GLUCOSE SERPL-MCNC: 95 MG/DL (ref 65–99)
HCT VFR BLD AUTO: 40.7 % (ref 37–47)
HDLC SERPL-MCNC: 56 MG/DL
HGB BLD-MCNC: 13 G/DL (ref 12–16)
LDLC SERPL CALC-MCNC: 101 MG/DL
MCH RBC QN AUTO: 29.3 PG (ref 27–33)
MCHC RBC AUTO-ENTMCNC: 31.9 G/DL (ref 33.6–35)
MCV RBC AUTO: 91.9 FL (ref 81.4–97.8)
NT-PROBNP SERPL IA-MCNC: 638 PG/ML (ref 0–125)
PLATELET # BLD AUTO: 184 K/UL (ref 164–446)
PMV BLD AUTO: 11.8 FL (ref 9–12.9)
POTASSIUM SERPL-SCNC: 4.6 MMOL/L (ref 3.6–5.5)
PROT SERPL-MCNC: 7.2 G/DL (ref 6–8.2)
RBC # BLD AUTO: 4.43 M/UL (ref 4.2–5.4)
SODIUM SERPL-SCNC: 143 MMOL/L (ref 135–145)
TRIGL SERPL-MCNC: 96 MG/DL (ref 0–149)
WBC # BLD AUTO: 6.2 K/UL (ref 4.8–10.8)

## 2023-03-24 PROCEDURE — 80053 COMPREHEN METABOLIC PANEL: CPT

## 2023-03-24 PROCEDURE — 85027 COMPLETE CBC AUTOMATED: CPT

## 2023-03-24 PROCEDURE — 80061 LIPID PANEL: CPT | Mod: GA

## 2023-03-24 PROCEDURE — 83880 ASSAY OF NATRIURETIC PEPTIDE: CPT | Mod: GA

## 2023-03-24 PROCEDURE — 36415 COLL VENOUS BLD VENIPUNCTURE: CPT

## 2023-03-28 ENCOUNTER — PATIENT MESSAGE (OUTPATIENT)
Dept: CARDIOLOGY | Facility: MEDICAL CENTER | Age: 88
End: 2023-03-28
Payer: MEDICARE

## 2023-03-28 DIAGNOSIS — E78.5 HYPERLIPIDEMIA, UNSPECIFIED HYPERLIPIDEMIA TYPE: ICD-10-CM

## 2023-03-28 RX ORDER — ATORVASTATIN CALCIUM 20 MG/1
20 TABLET, FILM COATED ORAL EVERY EVENING
Qty: 90 TABLET | Refills: 3 | Status: ON HOLD | OUTPATIENT
Start: 2023-03-28

## 2023-03-28 RX ORDER — ATORVASTATIN CALCIUM 20 MG/1
20 TABLET, FILM COATED ORAL EVERY EVENING
Qty: 90 TABLET | Refills: 3 | Status: SHIPPED | OUTPATIENT
Start: 2023-03-28 | End: 2023-03-28

## 2023-03-28 NOTE — TELEPHONE ENCOUNTER
----- Message from CRISTINO Wynn sent at 3/28/2023  7:54 AM PDT -----  Please increase the patient's atorvastatin to 20 mg daily and let her know that we have made this change

## 2023-04-06 DIAGNOSIS — Z95.828 PRESENCE OF IVC FILTER: ICD-10-CM

## 2023-04-12 ENCOUNTER — HOSPITAL ENCOUNTER (OUTPATIENT)
Dept: RADIOLOGY | Facility: MEDICAL CENTER | Age: 88
End: 2023-04-12
Attending: NURSE PRACTITIONER
Payer: MEDICARE

## 2023-04-12 DIAGNOSIS — Z95.828 PRESENCE OF IVC FILTER: ICD-10-CM

## 2023-04-12 PROCEDURE — 74177 CT ABD & PELVIS W/CONTRAST: CPT

## 2023-04-12 PROCEDURE — 700117 HCHG RX CONTRAST REV CODE 255

## 2023-04-12 RX ADMIN — IOHEXOL 100 ML: 350 INJECTION, SOLUTION INTRAVENOUS at 15:40

## 2023-04-25 ENCOUNTER — HOSPITAL ENCOUNTER (OUTPATIENT)
Facility: MEDICAL CENTER | Age: 88
End: 2023-04-25
Attending: NURSE PRACTITIONER
Payer: MEDICARE

## 2023-04-25 LAB
ALBUMIN SERPL BCP-MCNC: 4.3 G/DL (ref 3.2–4.9)
ALBUMIN/GLOB SERPL: 1.7 G/DL
ALP SERPL-CCNC: 92 U/L (ref 30–99)
ALT SERPL-CCNC: 46 U/L (ref 2–50)
ANION GAP SERPL CALC-SCNC: 10 MMOL/L (ref 7–16)
AST SERPL-CCNC: 32 U/L (ref 12–45)
BILIRUB SERPL-MCNC: 0.5 MG/DL (ref 0.1–1.5)
BUN SERPL-MCNC: 33 MG/DL (ref 8–22)
CALCIUM ALBUM COR SERPL-MCNC: 8.5 MG/DL (ref 8.5–10.5)
CALCIUM SERPL-MCNC: 8.7 MG/DL (ref 8.5–10.5)
CHLORIDE SERPL-SCNC: 107 MMOL/L (ref 96–112)
CO2 SERPL-SCNC: 25 MMOL/L (ref 20–33)
CREAT SERPL-MCNC: 0.71 MG/DL (ref 0.5–1.4)
GFR SERPLBLD CREATININE-BSD FMLA CKD-EPI: 82 ML/MIN/1.73 M 2
GLOBULIN SER CALC-MCNC: 2.5 G/DL (ref 1.9–3.5)
GLUCOSE SERPL-MCNC: 59 MG/DL (ref 65–99)
POTASSIUM SERPL-SCNC: 4.9 MMOL/L (ref 3.6–5.5)
PROT SERPL-MCNC: 6.8 G/DL (ref 6–8.2)
SODIUM SERPL-SCNC: 142 MMOL/L (ref 135–145)
TSH SERPL DL<=0.005 MIU/L-ACNC: 3.31 UIU/ML (ref 0.38–5.33)

## 2023-04-25 PROCEDURE — 84443 ASSAY THYROID STIM HORMONE: CPT

## 2023-04-25 PROCEDURE — 80053 COMPREHEN METABOLIC PANEL: CPT

## 2023-04-28 ENCOUNTER — OFFICE VISIT (OUTPATIENT)
Dept: VASCULAR LAB | Facility: MEDICAL CENTER | Age: 88
End: 2023-04-28
Attending: FAMILY MEDICINE
Payer: MEDICARE

## 2023-04-28 VITALS
HEART RATE: 71 BPM | SYSTOLIC BLOOD PRESSURE: 146 MMHG | WEIGHT: 153 LBS | BODY MASS INDEX: 27.11 KG/M2 | HEIGHT: 63 IN | DIASTOLIC BLOOD PRESSURE: 75 MMHG

## 2023-04-28 DIAGNOSIS — I82.409 RECURRENT DEEP VEIN THROMBOSIS (DVT) (HCC): ICD-10-CM

## 2023-04-28 DIAGNOSIS — I65.23 CAROTID ATHEROSCLEROSIS, BILATERAL: ICD-10-CM

## 2023-04-28 DIAGNOSIS — Z95.828 PRESENCE OF IVC FILTER: ICD-10-CM

## 2023-04-28 DIAGNOSIS — I82.5Y2 CHRONIC DEEP VEIN THROMBOSIS (DVT) OF PROXIMAL VEIN OF LEFT LOWER EXTREMITY (HCC): ICD-10-CM

## 2023-04-28 DIAGNOSIS — D68.59 PROTEIN S DEFICIENCY (HCC): ICD-10-CM

## 2023-04-28 DIAGNOSIS — E78.49 OTHER HYPERLIPIDEMIA: ICD-10-CM

## 2023-04-28 DIAGNOSIS — Z79.01 CHRONIC ANTICOAGULATION: ICD-10-CM

## 2023-04-28 DIAGNOSIS — R03.0 ELEVATED BLOOD PRESSURE READING WITHOUT DIAGNOSIS OF HYPERTENSION: ICD-10-CM

## 2023-04-28 PROCEDURE — 99214 OFFICE O/P EST MOD 30 MIN: CPT | Performed by: FAMILY MEDICINE

## 2023-04-28 PROCEDURE — 99212 OFFICE O/P EST SF 10 MIN: CPT

## 2023-04-28 RX ORDER — AMLODIPINE BESYLATE 2.5 MG/1
2.5 TABLET ORAL
Qty: 90 TABLET | Refills: 3 | Status: SHIPPED | OUTPATIENT
Start: 2023-04-28 | End: 2023-07-27 | Stop reason: SDUPTHER

## 2023-04-28 ASSESSMENT — FIBROSIS 4 INDEX: FIB4 SCORE: 2.21

## 2023-04-28 ASSESSMENT — ENCOUNTER SYMPTOMS
FEVER: 0
HEMOPTYSIS: 0
ORTHOPNEA: 0
PND: 0
WHEEZING: 0
COUGH: 0
CHILLS: 0
CLAUDICATION: 0
SPUTUM PRODUCTION: 0
SHORTNESS OF BREATH: 0
PALPITATIONS: 0

## 2023-04-28 NOTE — PROGRESS NOTES
FOLLOW-UP VASCULAR ANTICOAGULATION VISIT  04/28/23   Marry Mathur is a female who presents for f/u   Initially referred by Yvan Shearer P.A.-C. for length of therapy (LOT) determination and management of anticoagulation in context of acute venothromboembolic disease    Subjective      Interval hx/concerns:  feeling well, had CTV.  Has started losartan.  Having ongoing leg/back pain and titrating up on gabapentin. Ongoing leg swelling despite stopping amlodipine.     HTN:  Home BP log: variable,  110-170s/60-90s.  Avg 140-150s/80s   Adherence to current HTN meds: compliant all of the time     IVC filter:   Date of placement: 9/26/22, failed retrieval 1/9/23   Reason for placement: back surgery , surg-assoc LLE DVT, unable to anticoag  Pending surgeries: none    had failed IVC filter retrieval 1/9/23 possibly related to thrombus at filter site per report    VTE disease / Anticoagulation: (reviewed from prior)  Date of initiation of anticoagulation: >20yrs   Current symptoms:  Denies current SOB, CP, leg swelling, edema, leg pain, cyanosis of digits, redness of extremities.  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 Yes, Details: multiple   Any family VTE hx? No  _____PROVOKING FACTORS:  Recent surgery ? Yes, Details: back surgery   Recent trauma ? No  Smoker?  reports that she has never smoked. She has never used smokeless tobacco.    Extended travel? No  Other periods of immobility? Yes, Details: related to back surg  Other known or potential risk factors for VTE disease:  no  WOMEN: Hx of cancer or abnormal cancer screenings: no       Any estrogen,  "testosterone HRT, E2 birth control, tamoxifene, raloxifene: no       Hx of recurrent miscarriages: no  Hypercoaguability work-up completed?  Chart reports Protein S deficiency - patient reports unaware of this diagnosis            Current Outpatient Medications:     amLODIPine, 2.5 mg, Oral, QHS    atorvastatin, 20 mg, Oral, Q EVENING, Taking    gabapentin, 600 mg, Oral, Nightly, Taking    losartan, 50 mg, Oral, DAILY, Taking    Multiple Vitamins-Minerals (PRESERVISION AREDS 2 PO), 1 Capsule, Oral, BID, Taking    Calcium Carbonate, 600 mg, Oral, DAILY, Taking    rivaroxaban, 20 mg, Oral, PM MEAL, Taking    polyethylene glycol/lytes, 17 g, Oral, QDAY PRN, Taking    acetaminophen, 750 mg, Oral, TID PRN, PRN    melatonin, 10 mg, Oral, Nightly, Taking    omeprazole, 20 mg, Oral, DAILY, Taking    vitamin D, 1,000 Units, Oral, QAM, Taking     Social History     Tobacco Use    Smoking status: Never    Smokeless tobacco: Never   Vaping Use    Vaping Use: Never used   Substance Use Topics    Alcohol use: Not Currently     Comment: very rare    Drug use: Not Currently       DIET AND EXERCISE:  Weight Change:stable   BMI Readings from Last 5 Encounters:   04/28/23 27.10 kg/m²   04/03/23 26.39 kg/m²   03/09/23 26.39 kg/m²   02/17/23 26.57 kg/m²   01/13/23 26.25 kg/m²     Diet: common adult  Exercise: moderate regular exercise program     Review of Systems   Constitutional:  Negative for chills, fever and malaise/fatigue.   Respiratory:  Negative for cough, hemoptysis, sputum production, shortness of breath and wheezing.    Cardiovascular:  Negative for chest pain, palpitations, orthopnea, claudication, leg swelling and PND.         Objective    Vitals:    04/28/23 1045 04/28/23 1048   BP: (!) 159/78 (!) 146/75   BP Location: Left arm Left arm   Patient Position: Sitting Sitting   BP Cuff Size: Adult Adult   Pulse: 71    Weight: 69.4 kg (153 lb)    Height: 1.6 m (5' 3\")       BP Readings from Last 20 Encounters:   04/28/23 (!) " 146/75   03/09/23 130/72   02/17/23 118/68   01/13/23 (!) 155/82   01/09/23 (!) 176/79   12/29/22 (!) 150/90   12/29/22 130/72   12/15/22 108/65   10/06/22 (!) 145/65   09/28/22 119/75   09/24/22 (!) 168/78   09/08/22 (!) 164/120   08/25/22 124/74   08/22/22 (!) 138/92   08/12/22 (!) 140/84   08/04/22 122/82   08/01/22 132/86   07/12/22 (!) 159/89   07/06/22 122/74   06/30/22 128/80      Body mass index is 27.1 kg/m².  Physical Exam  Vitals reviewed.   Constitutional:       General: She is not in acute distress.     Appearance: Normal appearance.   HENT:      Head: Normocephalic and atraumatic.   Neck:      Thyroid: No thyroid mass.      Vascular: Normal carotid pulses.      Trachea: Trachea normal.   Cardiovascular:      Rate and Rhythm: Normal rate and regular rhythm.      Chest Wall: PMI is not displaced.      Pulses: Normal pulses.           Carotid pulses are 2+ on the right side and 2+ on the left side.       Radial pulses are 2+ on the right side and 2+ on the left side.        Dorsalis pedis pulses are 2+ on the right side and 2+ on the left side.        Posterior tibial pulses are 2+ on the right side and 2+ on the left side.      Heart sounds: Murmur heard.   Pulmonary:      Effort: Pulmonary effort is normal.      Breath sounds: Normal breath sounds.   Musculoskeletal:      Cervical back: Full passive range of motion without pain.      Right lower leg: No edema.      Left lower leg: No edema.   Skin:     General: Skin is warm and dry.      Capillary Refill: Capillary refill takes less than 2 seconds.      Coloration: Skin is not cyanotic.      Nails: There is no clubbing.   Neurological:      General: No focal deficit present.      Mental Status: She is alert and oriented to person, place, and time. Mental status is at baseline.      Cranial Nerves: No cranial nerve deficit.      Coordination: Coordination is intact. Coordination normal.      Gait: Gait is intact. Gait normal.   Psychiatric:         Mood  and Affect: Mood normal.         Behavior: Behavior normal.       Lab Results   Component Value Date    CHOLSTRLTOT 176 03/24/2023     (H) 03/24/2023    HDL 56 03/24/2023    TRIGLYCERIDE 96 03/24/2023      No results found for: LIPOPROTA   No results found for: APOB    Lab Results   Component Value Date    PROTHROMBTM 16.0 (H) 12/29/2022    INR 1.31 (H) 12/29/2022       Lab Results   Component Value Date    HBA1C 5.6 09/16/2022      Lab Results   Component Value Date    SODIUM 142 04/25/2023    POTASSIUM 4.9 04/25/2023    CHLORIDE 107 04/25/2023    CO2 25 04/25/2023    GLUCOSE 59 (L) 04/25/2023    BUN 33 (H) 04/25/2023    CREATININE 0.71 04/25/2023    IFAFRICA >60 12/05/2021    IFNOTAFR >60 12/05/2021        Lab Results   Component Value Date    WBC 6.2 03/24/2023    RBC 4.43 03/24/2023    HEMOGLOBIN 13.0 03/24/2023    HEMATOCRIT 40.7 03/24/2023    MCV 91.9 03/24/2023    MCH 29.3 03/24/2023    MCHC 31.9 (L) 03/24/2023    MPV 11.8 03/24/2023      VASCULAR IMAGING:     Carotid 2018  1.  There mild to moderate atherosclerotic plaque.  Plaque is located in carotid bulbs and proximal internal carotid arteries.  Plaque characterization:  Primarily calcific   2.  There is no evidence of carotid occlusion.   3. Vertebral arteries demonstrate antegrade flow.   4. Diameter reduction in the internal carotid arteries: less than 50%. There is no hemodynamically significant stenosis.    LLE venous 10/2019   Chronic appearing non-occlusive thrombus within the mid femoral vein.    VANESSA 2020  There is no evidence of major arterial disease demonstrated    RLE venous 4/12/21  . No evidence of right lower extremity deep venous thrombosis.    LLE venous 9/25/22   1) Non-occlusive thrombus in the left mid SFV, likely chronic.  Otherwise    no acute DVT appreciated    IVC filter placement 9/26/22  1. Ultrasound and fluoroscopic guided placement of a Argon Option retrievable caval filter in the infrarenal IVC.  The Argon Option  retrievable filter is suitable for removal under usual circumstances to 180 days (6 months) and sometimes up to one year and longer. This filter may also be used as a permanent filter. Please consult Interventional Radiology for filter   removal if indicated.   2. Inferior vena cavagram within normal limits with no evidence of caval thrombus or occlusion.    CTA abd/pelvis 10/3/22  Aorta and vasculature: No dissection. No abdominal aortic aneurysm. No evidence of hemodynamically significant stenosis in the major visceral branches, pelvic arteries or imaged femoral arteries.   1.  Large RIGHT retroperitoneal hematoma without evidence of active arterial extravasation  2.  No evidence of aortic dissection or hemodynamically significant stenosis  3.  LEFT lower quadrant chronic fat necrosis redemonstrated. This is not demonstrably changed.  4.  Prior cholecystectomy with mild biliary dilatation which could be compensatory  5.  Trace RIGHT pleural effusion    IVC filter retrieval 1/9/23  1. Inferior venacavogram demonstrating small amount of tip thrombus   2. Unsuccessful attempted retrieval of infrarenal IVC filter, possibly due to small amount of clot at the filter tip entering into the wall and preventing engagement. This filter could be LEFT in place if clinically appropriate. Further interval of   anticoagulation therapy followed by more aggressive attempts at filter retrieval may be warranted if clinically appropriate.     CTV abd/pelvis 4/12/23  1.  IVC filter is again noted. Grossly there is no evidence of IVC thrombosis with evaluation degraded by streak artifact from lumbar spinal fusion hardware.  2.  No definite iliofemoral DVT.  3.  No acute abnormality.  4.  Additional findings as detailed.          Medical Decision Making:  Today's Assessment / Status / Plan:     1. Recurrent deep vein thrombosis (DVT) (HCC)  PROTEIN S FUNCTIONAL      2. Presence of IVC filter        3. Chronic deep vein thrombosis (DVT)  of proximal vein of left lower extremity (HCC)  PROTEIN S FUNCTIONAL      4. Elevated blood pressure reading without diagnosis of hypertension  amLODIPine (NORVASC) 2.5 MG Tab    Referral to 24-Hour Blood Pressure Monitoring      5. Protein S deficiency (HCC)        6. Carotid atherosclerosis, bilateral        7. Other hyperlipidemia        8. Chronic anticoagulation            PATIENT TYPE: Primary Prevention    Etiology of Established CVD if Present:     1) recurrent VTE disease   9/2022 - most recent - chronic LLE fem V DVT as of - stable   Thrombophilia/hypercoag evaluation: chart reports protein S deficiency however pt reports no knowledge of this and there is no off-anticoag prot S levels available for my review on the chart, so this would draw a question as to validity of dx   - no fhx of recurrent VTE   - no imaging surveillance needed at this time  - antthrombotic plan as noted below   - repeat prot S level after xarelto cessation prior to IVC filter retrieval      2) Aortic stenosis - no symptoms, non progressive  - defer surveillance and mgmt to cardiology     IVC filter, placed 9/26/22  Previously discussed the risks and benefits of leaving the IVC filter in place versus removing it.  Risks of leaving filter in place, although rare in occurrence, include filter fracture, development of clot above filter causing PE, and/or mobility of filter.  Explained procedure for removal.  Discussed possibility of complications such as inability to remove filter if a thrombosis is found within filter or if it is embedded in the vessel it may be left in place or require specialty surgical or interventional radiology consultation for removal.   Future Surgeries: none   Bleeding complications while on anticoagulation: had 1 episode of retroperitoneal Hemorrhage  - failed retrieval 1/9/23 per IR   CTV 4/10/23 shows no indications of DVT on IVC filter   Decision:   - recommended for IVC filter retrieval as no longer has  indications  - stable for IVC filter removal.    - provided review of anatomy and basics of retrieval procedure in addition to a pt educ handout.   - reviewed of options including maintenance of filter and retrieval, and through [id number] has opted for retrieval in line with current FDA recommendations for filter retrieval when no longer indicated   - APRN to coordinate with local IR, vascular surgeon, and/or with [facility] IVC filter retrieval program  for more complex cases   - provided review of anatomy and basics of retrieval procedure in addition to a pt educ handout.     ANTITHROMBOTIC THERAPY:  Date of initiation: >20yrs   HAS-BLED bleeding risk calc ([email or web address]): 2 pt, 4.1%, moderate risk   Factors to consider for indefinite OAC: recurrent, ? Prot S deficiency   Last CBC, BMP: reviewed above   Expected duration: reviewed standard of care as per Chest 2021 guidelines is 3-6 months minimum on full dose OAC.  After review of risks/benefits/alternatives, through shared decision-making, we will continue indefinitely xarelto 20mg   Antithrombotic therapy plan:  - continue xarelto 20mg daily indefinitely - annual risk/benefit review  - ongoing mgmt per AC clinic   -  CBC and ongoing Q6mo surveillance as per AC clinic standard  - counseled on signs and symptoms of acute VTE that require seeking prompt attention in the ED to include shortness of breath, chest pain, pain with deep inhalation, acute leg swelling and/or pain in calf or leg   - elevate legs as much as possible, use compression stockings/socks if directed by your provider  - Avoid hormonal therapies including estrogen or testosterone-containing meds, or raloxifene or tamoxifene (commonly used for osteoporosis)  - Avoid sedentary periods  - continue complete avoidance of tobacco products  - if having any invasive procedure,please make sure the doctor knows of your history of blood clots and current anticoagulation status  - avoid Aspirin and  anti-inflammatories (eg. Advil, ibuprofen, Aleve, naproxen, etc) while anticoagulated   - avoid skiing or other dangerous activities to reduce risk of head injury and brain bleeds  - recommended to see your PCP to discuss if you need age-appropriate cancer screenings as a small % of blood clots may be caused by an underlying malignancy  - if any bleeding lasting 30min without stopping, please seek care with your PCP, urgent care, or ED  - reversal agents for most blood thinners are now available and used if you have major bleeding    LIPID MANAGEMENT:   Qualifies for Statin Therapy Based on 2018 ACC/AHA Guidelines: yes, no guidelines for >74yo primary prevention  The ASCVD Risk score (Stacie DK, et al., 2019) failed to calculate., N/A  Major ASCVD events: None  High-risk conditions: N/A  Risk-enhancers: N/A  Currently on Statin: Yes  Tx goals: LDL-C <100 (consider non-HDL-C <130, apoB <90), reasonable   At goal? Yes, 9/2021   Plan:   - reinforced ongoing TLC measures as noted   - monitor labs   Meds:   - continue atorva 20mg daily     BLOOD PRESSURE MANAGEMENT:  ACC/AHA (2017) goal <130/80  Home BP at goal: yes  Office BP at goal:  no  Plan:   - continue healthy diet, activity, weight mgmt   Monitoring:   - routine clinic-based BP measurements at least once annually   Medications:   -restart amlodipine 2.5mg QHS - best med for variability reduction, swelling should be countered by ARB, however gabapentin at high dose likely cause of swelling  - continue losartan 50mg daily, increase as next step  - add thiazide as 3rd agent     GLYCEMIC STATUS:  Normal    LIFESTYLE INTERVENTIONS:    SMOKING:    reports that she has never smoked. She has never used smokeless tobacco.   - continued complete avoidance of all tobacco products     PHYSICAL ACTIVITY: continue healthy activity to improve CV fitness.  In general, targeting >150min/week of moderate-level activity or as much as tolerated in light of functional status and  co-morbidities     WEIGHT MANAGEMENT AND NUTRITION: Mediterranean style dietary approach       OTHER: none       Instructed to follow-up with PCP for remainder of adult medical needs: yes  We will partner with other providers in the management of established vascular disease and cardiometabolic risk factors.    Studies to Be Obtained: IVC filter retrieval  Labs to Be Obtained: as noted above     Follow up in: 3mo    Toney Guidry M.D.  [facility]   [facility]   [phone]

## 2023-05-03 DIAGNOSIS — K21.9 GASTROESOPHAGEAL REFLUX DISEASE WITHOUT ESOPHAGITIS: ICD-10-CM

## 2023-05-03 RX ORDER — OMEPRAZOLE 20 MG/1
20 CAPSULE, DELAYED RELEASE ORAL DAILY
Qty: 90 CAPSULE | Refills: 3 | Status: ON HOLD | OUTPATIENT
Start: 2023-05-03 | End: 2023-06-29 | Stop reason: SDUPTHER

## 2023-05-03 NOTE — TELEPHONE ENCOUNTER
Requested Prescriptions     Signed Prescriptions Disp Refills    omeprazole (PRILOSEC) 20 MG delayed-release capsule 90 Capsule 3     Sig: Take 1 Capsule by mouth every day.     Authorizing Provider: ORALIA MONTANO A.P.R.N.

## 2023-05-04 ENCOUNTER — TELEPHONE (OUTPATIENT)
Dept: HEALTH INFORMATION MANAGEMENT | Facility: OTHER | Age: 88
End: 2023-05-04
Payer: MEDICARE

## 2023-05-05 ENCOUNTER — DOCUMENTATION (OUTPATIENT)
Dept: VASCULAR LAB | Facility: MEDICAL CENTER | Age: 88
End: 2023-05-05
Payer: MEDICARE

## 2023-05-05 NOTE — PROGRESS NOTES
Left message to schedule a 24 hour blood pressure monitor.  Next available. Mon or Wednesday only.

## 2023-05-11 ENCOUNTER — DOCUMENTATION (OUTPATIENT)
Dept: VASCULAR LAB | Facility: MEDICAL CENTER | Age: 88
End: 2023-05-11
Payer: MEDICARE

## 2023-05-16 ENCOUNTER — PRE-ADMISSION TESTING (OUTPATIENT)
Dept: ADMISSIONS | Facility: MEDICAL CENTER | Age: 88
End: 2023-05-16
Attending: FAMILY MEDICINE
Payer: MEDICARE

## 2023-05-17 ENCOUNTER — APPOINTMENT (RX ONLY)
Dept: URBAN - METROPOLITAN AREA CLINIC 22 | Facility: CLINIC | Age: 88
Setting detail: DERMATOLOGY
End: 2023-05-17

## 2023-05-17 DIAGNOSIS — L81.4 OTHER MELANIN HYPERPIGMENTATION: ICD-10-CM

## 2023-05-17 DIAGNOSIS — D18.0 HEMANGIOMA: ICD-10-CM

## 2023-05-17 DIAGNOSIS — Z71.89 OTHER SPECIFIED COUNSELING: ICD-10-CM

## 2023-05-17 DIAGNOSIS — Z85.820 PERSONAL HISTORY OF MALIGNANT MELANOMA OF SKIN: ICD-10-CM

## 2023-05-17 DIAGNOSIS — Z85.828 PERSONAL HISTORY OF OTHER MALIGNANT NEOPLASM OF SKIN: ICD-10-CM

## 2023-05-17 DIAGNOSIS — D22 MELANOCYTIC NEVI: ICD-10-CM

## 2023-05-17 DIAGNOSIS — L60.1 ONYCHOLYSIS: ICD-10-CM

## 2023-05-17 DIAGNOSIS — L57.0 ACTINIC KERATOSIS: ICD-10-CM

## 2023-05-17 DIAGNOSIS — L82.1 OTHER SEBORRHEIC KERATOSIS: ICD-10-CM

## 2023-05-17 DIAGNOSIS — Z86.006 PERSONAL HISTORY OF MELANOMA IN-SITU: ICD-10-CM

## 2023-05-17 PROBLEM — D18.01 HEMANGIOMA OF SKIN AND SUBCUTANEOUS TISSUE: Status: ACTIVE | Noted: 2023-05-17

## 2023-05-17 PROBLEM — D22.5 MELANOCYTIC NEVI OF TRUNK: Status: ACTIVE | Noted: 2023-05-17

## 2023-05-17 PROCEDURE — 17003 DESTRUCT PREMALG LES 2-14: CPT

## 2023-05-17 PROCEDURE — ? NAIL CLIPPING FOR PAS

## 2023-05-17 PROCEDURE — ? SUNSCREEN RECOMMENDATIONS

## 2023-05-17 PROCEDURE — ? COUNSELING

## 2023-05-17 PROCEDURE — 17000 DESTRUCT PREMALG LESION: CPT

## 2023-05-17 PROCEDURE — ? ADDITIONAL NOTES

## 2023-05-17 PROCEDURE — ? LIQUID NITROGEN

## 2023-05-17 PROCEDURE — 99213 OFFICE O/P EST LOW 20 MIN: CPT | Mod: 25

## 2023-05-17 ASSESSMENT — LOCATION ZONE DERM
LOCATION ZONE: FINGERNAIL
LOCATION ZONE: EYELID
LOCATION ZONE: ARM
LOCATION ZONE: FACE
LOCATION ZONE: NOSE
LOCATION ZONE: LEG
LOCATION ZONE: HAND
LOCATION ZONE: SCALP
LOCATION ZONE: LIP
LOCATION ZONE: TRUNK
LOCATION ZONE: NECK

## 2023-05-17 ASSESSMENT — LOCATION DETAILED DESCRIPTION DERM
LOCATION DETAILED: LEFT DISTAL PRETIBIAL REGION
LOCATION DETAILED: LEFT LATERAL EYEBROW
LOCATION DETAILED: LEFT SUPERIOR PARIETAL SCALP
LOCATION DETAILED: LEFT INFERIOR UPPER BACK
LOCATION DETAILED: LEFT MEDIAL ZYGOMA
LOCATION DETAILED: LEFT DISTAL DORSAL FOREARM
LOCATION DETAILED: LEFT PROXIMAL DORSAL FOREARM
LOCATION DETAILED: RIGHT UPPER CUTANEOUS LIP
LOCATION DETAILED: NASAL SUPRATIP
LOCATION DETAILED: LEFT INFERIOR LATERAL FOREHEAD
LOCATION DETAILED: RIGHT DISTAL PRETIBIAL REGION
LOCATION DETAILED: RIGHT INFERIOR MEDIAL FOREHEAD
LOCATION DETAILED: LEFT LATERAL ABDOMEN
LOCATION DETAILED: RIGHT MID PREAURICULAR CHEEK
LOCATION DETAILED: RIGHT LATERAL SUPERIOR CHEST
LOCATION DETAILED: POSTERIOR MID-PARIETAL SCALP
LOCATION DETAILED: LEFT CENTRAL POSTAURICULAR SKIN
LOCATION DETAILED: RIGHT CLAVICULAR SKIN
LOCATION DETAILED: RIGHT CENTRAL LATERAL NECK
LOCATION DETAILED: LEFT LATERAL SUPERIOR CHEST
LOCATION DETAILED: RIGHT RADIAL DORSAL HAND
LOCATION DETAILED: RIGHT MEDIAL CANTHUS
LOCATION DETAILED: NASAL DORSUM
LOCATION DETAILED: LEFT LATERAL MALAR CHEEK
LOCATION DETAILED: LEFT SMALL FINGERNAIL
LOCATION DETAILED: RIGHT PROXIMAL DORSAL FOREARM
LOCATION DETAILED: RIGHT MEDIAL ZYGOMA
LOCATION DETAILED: RIGHT MID-UPPER BACK
LOCATION DETAILED: LEFT SUPERIOR FOREHEAD
LOCATION DETAILED: LEFT DORSAL THUMB METACARPOPHALANGEAL JOINT
LOCATION DETAILED: RIGHT NASAL ALA
LOCATION DETAILED: RIGHT DISTAL DORSAL FOREARM
LOCATION DETAILED: RIGHT PROXIMAL LATERAL POSTERIOR UPPER ARM

## 2023-05-17 ASSESSMENT — LOCATION SIMPLE DESCRIPTION DERM
LOCATION SIMPLE: LEFT FOREARM
LOCATION SIMPLE: RIGHT FOREARM
LOCATION SIMPLE: RIGHT HAND
LOCATION SIMPLE: POSTERIOR SCALP
LOCATION SIMPLE: RIGHT EYELID
LOCATION SIMPLE: LEFT UPPER BACK
LOCATION SIMPLE: ABDOMEN
LOCATION SIMPLE: LEFT HAND
LOCATION SIMPLE: RIGHT CHEEK
LOCATION SIMPLE: RIGHT LIP
LOCATION SIMPLE: SCALP
LOCATION SIMPLE: LEFT FOREHEAD
LOCATION SIMPLE: LEFT EYEBROW
LOCATION SIMPLE: RIGHT FOREHEAD
LOCATION SIMPLE: NECK
LOCATION SIMPLE: RIGHT ZYGOMA
LOCATION SIMPLE: LEFT PRETIBIAL REGION
LOCATION SIMPLE: RIGHT UPPER BACK
LOCATION SIMPLE: RIGHT POSTERIOR UPPER ARM
LOCATION SIMPLE: LEFT ZYGOMA
LOCATION SIMPLE: LEFT SMALL FINGERNAIL
LOCATION SIMPLE: NOSE
LOCATION SIMPLE: CHEST
LOCATION SIMPLE: RIGHT PRETIBIAL REGION
LOCATION SIMPLE: RIGHT NOSE
LOCATION SIMPLE: LEFT CHEEK
LOCATION SIMPLE: RIGHT CLAVICULAR SKIN

## 2023-05-17 NOTE — PROCEDURE: NAIL CLIPPING FOR PAS
Detail Level: Detailed
Billing Type: Third-Party Bill
Add [id number] To Bill?: No
Lab: [id number]
Lab Facility:

## 2023-05-17 NOTE — HPI: MELANOMA F/U (HISTORY OF MALIGNANT MELANOMA)
What Is The Reason For Today's Visit?: History of Melanoma
Breslow Depth?: 1.1 mm
Year Excised?: 2018, 2022

## 2023-05-17 NOTE — PROCEDURE: ADDITIONAL NOTES
Additional Notes: Patient notes she has had nail changes for 5 or so years. \\nShe feels her left 5th digit has been having particular issues and is worried about an infection.  It has recently broken off quite a bit.
Render Risk Assessment In Note?: no
Detail Level: Simple

## 2023-05-18 ENCOUNTER — HOSPITAL ENCOUNTER (OUTPATIENT)
Dept: LAB | Facility: MEDICAL CENTER | Age: 88
End: 2023-05-18
Attending: FAMILY MEDICINE
Payer: MEDICARE

## 2023-05-18 DIAGNOSIS — I82.409 RECURRENT DEEP VEIN THROMBOSIS (DVT) (HCC): ICD-10-CM

## 2023-05-18 DIAGNOSIS — I82.5Y2 CHRONIC DEEP VEIN THROMBOSIS (DVT) OF PROXIMAL VEIN OF LEFT LOWER EXTREMITY (HCC): ICD-10-CM

## 2023-05-18 PROCEDURE — 36415 COLL VENOUS BLD VENIPUNCTURE: CPT

## 2023-05-18 PROCEDURE — 85306 CLOT INHIBIT PROT S FREE: CPT

## 2023-05-18 NOTE — OR NURSING
Pre-procedure bloodwork not completed at [facility] today.  Call made to debbie.  No answer.  Voicemail left with patient to inquire and to see if she could come to our department to complete bloodwork.  Callback number provided.

## 2023-05-19 ENCOUNTER — PRE-ADMISSION TESTING (OUTPATIENT)
Dept: ADMISSIONS | Facility: MEDICAL CENTER | Age: 88
End: 2023-05-19
Attending: FAMILY MEDICINE
Payer: MEDICARE

## 2023-05-19 DIAGNOSIS — Z01.812 PRE-OPERATIVE LABORATORY EXAMINATION: ICD-10-CM

## 2023-05-19 LAB
CREAT SERPL-MCNC: 0.82 MG/DL (ref 0.5–1.4)
ERYTHROCYTE [DISTWIDTH] IN BLOOD BY AUTOMATED COUNT: 52.5 FL (ref 35.9–50)
GFR SERPLBLD CREATININE-BSD FMLA CKD-EPI: 69 ML/MIN/1.73 M 2
HCT VFR BLD AUTO: 38.5 % (ref 37–47)
HGB BLD-MCNC: 12.4 G/DL (ref 12–16)
INR PPP: 1.47 (ref 0.87–1.13)
MCH RBC QN AUTO: 30.2 PG (ref 27–33)
MCHC RBC AUTO-ENTMCNC: 32.2 G/DL (ref 33.6–35)
MCV RBC AUTO: 93.7 FL (ref 81.4–97.8)
PLATELET # BLD AUTO: 174 K/UL (ref 164–446)
PMV BLD AUTO: 11.7 FL (ref 9–12.9)
PROTHROMBIN TIME: 17.6 SEC (ref 12–14.6)
RBC # BLD AUTO: 4.11 M/UL (ref 4.2–5.4)
WBC # BLD AUTO: 6.8 K/UL (ref 4.8–10.8)

## 2023-05-19 PROCEDURE — 85610 PROTHROMBIN TIME: CPT

## 2023-05-19 PROCEDURE — 82565 ASSAY OF CREATININE: CPT

## 2023-05-19 PROCEDURE — 36415 COLL VENOUS BLD VENIPUNCTURE: CPT

## 2023-05-19 PROCEDURE — 85027 COMPLETE CBC AUTOMATED: CPT

## 2023-05-21 LAB — PROT S ACT/NOR PPP: 103 % (ref 57–131)

## 2023-05-23 ENCOUNTER — TELEPHONE (OUTPATIENT)
Dept: CARDIOLOGY | Facility: MEDICAL CENTER | Age: 88
End: 2023-05-23
Payer: MEDICARE

## 2023-05-23 ENCOUNTER — HOSPITAL ENCOUNTER (OUTPATIENT)
Facility: MEDICAL CENTER | Age: 88
End: 2023-05-23
Attending: INTERNAL MEDICINE
Payer: MEDICARE

## 2023-05-23 PROCEDURE — 80053 COMPREHEN METABOLIC PANEL: CPT

## 2023-05-23 PROCEDURE — 84443 ASSAY THYROID STIM HORMONE: CPT

## 2023-05-23 NOTE — LETTER
PROCEDURE/SURGERY CLEARANCE FORM    Date: 6/13/2023   Patient Name: Marry Mathur    Dear Surgeon or Proceduralist,      Thank you for your request for cardiac stratification of our mutual patient Marry Mathur 1935. We have reviewed their [facility] records; and to the best of our understanding this patient has not had stenting, ablation, cardiothoracic surgery or hospitalization for cardiovascular reasons in the past 6 months.  Marry Mathur has been seen within the past 18 months and is considered to have non-modifiable cardiac risk for this low-risk procedure/surgery. They may proceed from a cardiovascular standpoint at moderate increased risk of perioperative cardiovascular complications.  I suggest procedures to be completed within the hospital and with cardiac anesthesia. They may hold their antiplatelet/anticoagulation as briefly as possible. Please have patient resume this medication when hemodynamically stable to do so.     Pradaxa/Xarelto/Eliquis/Savesya - hold 1 day prior to procedure for low bleeding risk procedure, 2 days for high bleeding risk procedure, or consider holding 3 days or longer for patients with reduced kidney function (CrCl <30mL/min) or spinal/cranial surgeries/procedures.      If they have a mechanical heart valve, please coordinate with [facility]ticoagulation [facility] ([phone]) the proper management of their anticoagulant in the periprocedural or perioperative period.      Some patients have higher risk for cardiovascular complications or holding medication. If our patient has had prior complications of holding antiplatelet or anticoagulants in the past and we have seen them after these events, we have addressed these concerns with the patient. They are at an unknown degree of increased risk for recurrent complication.  You may hold anticoagulation/antiplatelets for the procedure or surgery if the benefits of the procedure or surgery outweigh this  nonmodifiable risk.      If Marry Mathur 1935 has new symptoms of heart failure decompensation, unstable arrythmia, or angina please reach out and we will assess the patient.      If you have other patient-specific concerns, please feel free to reach out to the patient's cardiologist directly at [phone].     Thank you,       [facility]

## 2023-05-23 NOTE — TELEPHONE ENCOUNTER
"Last OV: 03/09/23  Proposed Surgery: Right L4-5 RFA  Surgery Date: TBD   Requesting Office Name: [facility]  Fax Number: [phone]  Preference of Location (default is surgery center unless specified by Cardiologist or HAYLEE)  Prior Clearance Addressed: No      Anticoags/Antiplatelets: Xarelto  Outstanding Cardiac Imaging : Yes  Echo.   Clearance to provider to review (SCHEDULED 06/08/23)  Stent, Cardiac Devices, or Catheterization: No  Ablation, TAVR, Cardioversion: No  Recent Cardiac Hospitalization: No            When: N/A  History (cardiac history):   Past Medical History:   Diagnosis Date    Adenocarcinoma of colon (HCC) 10/30/2018    From sessile serrated polyp 10/2018    Anesthesia     pt states \"one new dr scraped my esophagus when they put the tube in and I started bleeding so they couldn't operate\"     Back pain 06/01/2022    0/10    Blood clotting disorder (HCC) 2012    clot in leg    Bowel habit changes     constipation     Cancer (HCC)     skin, colon 2018    Cataract     belia IOL     Chronic back pain greater than 3 months duration     Deep vein thrombosis (HCC) 03/08/2016    First occurrence in LLE in late 1970s Second occurrence further up in LLE in 2012, has been on AC since     Essential hypertension, benign 06/27/2012    GERD (gastroesophageal reflux disease) 06/27/2012    Heart murmur     High cholesterol     Hyperlipidemia     Hypertension     hx of, not currently     Impaired fasting glucose 11/02/2017    Melanoma (HCC)     Mild aortic stenosis     Seeing Dr. Van    Other and unspecified hyperlipidemia 06/27/2012    Prediabetes     Protein S deficiency (HCC) 11/02/2017    Noted in lab work 2013 as a part of work up at [facility]     Rheumatoid arthritis involving multiple sites with positive rheumatoid factor (HCC) 03/14/2016    Dr. Escoto    Rheumatoid nodule (HCC) 07/25/2017    Right bundle branch block 06/27/2012    pt denies     Urinary incontinence 11/02/2017 "             Surgical Clearance Letter Sent: NO. Provider to advise.  PENDING ECHO  **Scan clearance request letter into [facility].**

## 2023-05-24 ENCOUNTER — HOSPITAL ENCOUNTER (EMERGENCY)
Facility: MEDICAL CENTER | Age: 88
End: 2023-05-24
Attending: EMERGENCY MEDICINE
Payer: MEDICARE

## 2023-05-24 ENCOUNTER — NON-PROVIDER VISIT (OUTPATIENT)
Dept: VASCULAR LAB | Facility: MEDICAL CENTER | Age: 88
End: 2023-05-24
Attending: FAMILY MEDICINE
Payer: MEDICARE

## 2023-05-24 ENCOUNTER — APPOINTMENT (OUTPATIENT)
Dept: RADIOLOGY | Facility: MEDICAL CENTER | Age: 88
End: 2023-05-24
Attending: EMERGENCY MEDICINE
Payer: MEDICARE

## 2023-05-24 VITALS
DIASTOLIC BLOOD PRESSURE: 74 MMHG | WEIGHT: 158.95 LBS | SYSTOLIC BLOOD PRESSURE: 140 MMHG | HEART RATE: 69 BPM | BODY MASS INDEX: 28.16 KG/M2 | HEIGHT: 63 IN | RESPIRATION RATE: 20 BRPM | TEMPERATURE: 97.2 F | OXYGEN SATURATION: 95 %

## 2023-05-24 DIAGNOSIS — R60.9 PERIPHERAL EDEMA: ICD-10-CM

## 2023-05-24 LAB
ALBUMIN SERPL BCP-MCNC: 3.9 G/DL (ref 3.2–4.9)
ALBUMIN/GLOB SERPL: 1.7 G/DL
ALP SERPL-CCNC: 193 U/L (ref 30–99)
ALT SERPL-CCNC: 55 U/L (ref 2–50)
ANION GAP SERPL CALC-SCNC: 10 MMOL/L (ref 7–16)
ANION GAP SERPL CALC-SCNC: 11 MMOL/L (ref 7–16)
AST SERPL-CCNC: 57 U/L (ref 12–45)
BASOPHILS # BLD AUTO: 1 % (ref 0–1.8)
BASOPHILS # BLD: 0.06 K/UL (ref 0–0.12)
BILIRUB SERPL-MCNC: 0.3 MG/DL (ref 0.1–1.5)
BUN SERPL-MCNC: 24 MG/DL (ref 8–22)
BUN SERPL-MCNC: 31 MG/DL (ref 8–22)
CALCIUM ALBUM COR SERPL-MCNC: 8.2 MG/DL (ref 8.5–10.5)
CALCIUM SERPL-MCNC: 8.1 MG/DL (ref 8.5–10.5)
CALCIUM SERPL-MCNC: 8.6 MG/DL (ref 8.5–10.5)
CHLORIDE SERPL-SCNC: 109 MMOL/L (ref 96–112)
CHLORIDE SERPL-SCNC: 109 MMOL/L (ref 96–112)
CO2 SERPL-SCNC: 24 MMOL/L (ref 20–33)
CO2 SERPL-SCNC: 24 MMOL/L (ref 20–33)
CREAT SERPL-MCNC: 0.92 MG/DL (ref 0.5–1.4)
CREAT SERPL-MCNC: 0.92 MG/DL (ref 0.5–1.4)
EOSINOPHIL # BLD AUTO: 0.49 K/UL (ref 0–0.51)
EOSINOPHIL NFR BLD: 8.1 % (ref 0–6.9)
ERYTHROCYTE [DISTWIDTH] IN BLOOD BY AUTOMATED COUNT: 51.8 FL (ref 35.9–50)
GFR SERPLBLD CREATININE-BSD FMLA CKD-EPI: 60 ML/MIN/1.73 M 2
GFR SERPLBLD CREATININE-BSD FMLA CKD-EPI: 60 ML/MIN/1.73 M 2
GLOBULIN SER CALC-MCNC: 2.3 G/DL (ref 1.9–3.5)
GLUCOSE SERPL-MCNC: 119 MG/DL (ref 65–99)
GLUCOSE SERPL-MCNC: 124 MG/DL (ref 65–99)
HCT VFR BLD AUTO: 36.2 % (ref 37–47)
HGB BLD-MCNC: 11.6 G/DL (ref 12–16)
IMM GRANULOCYTES # BLD AUTO: 0.01 K/UL (ref 0–0.11)
IMM GRANULOCYTES NFR BLD AUTO: 0.2 % (ref 0–0.9)
LYMPHOCYTES # BLD AUTO: 1.17 K/UL (ref 1–4.8)
LYMPHOCYTES NFR BLD: 19.3 % (ref 22–41)
MCH RBC QN AUTO: 29.4 PG (ref 27–33)
MCHC RBC AUTO-ENTMCNC: 32 G/DL (ref 32.2–35.5)
MCV RBC AUTO: 91.6 FL (ref 81.4–97.8)
MONOCYTES # BLD AUTO: 0.6 K/UL (ref 0–0.85)
MONOCYTES NFR BLD AUTO: 9.9 % (ref 0–13.4)
NEUTROPHILS # BLD AUTO: 3.73 K/UL (ref 1.82–7.42)
NEUTROPHILS NFR BLD: 61.5 % (ref 44–72)
NRBC # BLD AUTO: 0 K/UL
NRBC BLD-RTO: 0 /100 WBC (ref 0–0.2)
PLATELET # BLD AUTO: 185 K/UL (ref 164–446)
PMV BLD AUTO: 11.9 FL (ref 9–12.9)
POTASSIUM SERPL-SCNC: 4.8 MMOL/L (ref 3.6–5.5)
POTASSIUM SERPL-SCNC: 5.1 MMOL/L (ref 3.6–5.5)
PROT SERPL-MCNC: 6.2 G/DL (ref 6–8.2)
RBC # BLD AUTO: 3.95 M/UL (ref 4.2–5.4)
SODIUM SERPL-SCNC: 143 MMOL/L (ref 135–145)
SODIUM SERPL-SCNC: 144 MMOL/L (ref 135–145)
TSH SERPL DL<=0.005 MIU/L-ACNC: 1.21 UIU/ML (ref 0.38–5.33)
WBC # BLD AUTO: 6.1 K/UL (ref 4.8–10.8)

## 2023-05-24 PROCEDURE — 80048 BASIC METABOLIC PNL TOTAL CA: CPT

## 2023-05-24 PROCEDURE — 93970 EXTREMITY STUDY: CPT

## 2023-05-24 PROCEDURE — 85025 COMPLETE CBC W/AUTO DIFF WBC: CPT

## 2023-05-24 PROCEDURE — 99284 EMERGENCY DEPT VISIT MOD MDM: CPT

## 2023-05-24 PROCEDURE — 93790 AMBL BP MNTR W/SW I&R: CPT | Performed by: NURSE PRACTITIONER

## 2023-05-24 PROCEDURE — 93788 AMBL BP MNTR W/SW A/R: CPT

## 2023-05-24 PROCEDURE — 36415 COLL VENOUS BLD VENIPUNCTURE: CPT

## 2023-05-24 ASSESSMENT — PAIN DESCRIPTION - PAIN TYPE: TYPE: ACUTE PAIN

## 2023-05-24 ASSESSMENT — FIBROSIS 4 INDEX: FIB4 SCORE: 2.33

## 2023-05-24 NOTE — PROGRESS NOTES
ABPM placed on 05/24 at 1315    Pt advised on monitor care/procedure     Will be returning monitor on 05/25 before 1700    Verbal understanding provided

## 2023-05-25 NOTE — PROGRESS NOTES
Ambulatory blood pressure monitor results reviewed  Full report under media tab  Suboptimal data acquisition with only 30 successful readings    Mean available daytime: 130/75    Nocturnal dip: Appears well-maintained based on the readings we have available    Clinical correlation needed.  We will discuss with patient at follow-up visit    Michael Bloch, MD  [facility]

## 2023-05-25 NOTE — ED NOTES
Pt discharged to home. Discharge paperwork provided. Education provided by ERP.  Pt was given follow up instructions . Pt verbalized understanding of all instructions for discharge. Patient wheeled via wheelchair, alert and oriented x 4, out of ER with all belongings.

## 2023-05-25 NOTE — ED TRIAGE NOTES
"Chief Complaint   Patient presents with    Leg Swelling     Bilateral leg swelling x 1 day, +tingling.     Arm Pain     Right arm pain, pt currently wearing at home BP monitor on right arm. States symptoms started after monitor was placed.      86 yo F ambulatory to triage with steady gait for above complaints. Pt denies CP or SOB at this time.     Pt educated on triage process and placed in lobby. Told to inform staff of any changes.     /85   Pulse 83   Temp 36.2 °C (97.1 °F) (Temporal)   Resp 16   Ht 1.6 m (5' 3\")   Wt 72.1 kg (158 lb 15.2 oz)   LMP  (LMP Unknown)   SpO2 95%   BMI 28.16 kg/m²     "

## 2023-05-25 NOTE — ED PROVIDER NOTES
"ER Provider Note    Scribed for Neo Webber M.d. by William Torres. 5/24/2023  8:03 PM    Primary Care Provider: CRISTINO Horner    CHIEF COMPLAINT  Chief Complaint   Patient presents with    Leg Swelling     Bilateral leg swelling x 1 day, +tingling.     Arm Pain     Right arm pain, pt currently wearing at home BP monitor on right arm. States symptoms started after monitor was placed.      EXTERNAL RECORDS REVIEWED  Other Patient had a follow up in April discussing IVC filter removal    HPI/ROS  LIMITATION TO HISTORY   Select: : None  OUTSIDE HISTORIAN(S):  None    Marry Mathur is a 87 y.o. female who presents to the ED complaining of swelling and tingling to her legs which began at 1:15 today after being placed on a blood pressure monitor. She states she was seen at [facility] due to previous concerns of blood clots, and and was told by the provider to present to the emergency department for evaluation and potential treatment. She states that a medical assistant placed her on a blood pressure monitor when her hands, feet and legs became swollen, and her lower extremities became tingly, which has been ongoing since it started. She denies any fevers, chills, or shortness of breath. She states she has a scheduled procedure on Friday to have her IVC filter removed.     PAST MEDICAL HISTORY  Past Medical History:   Diagnosis Date    Adenocarcinoma of colon (HCC) 10/30/2018    From sessile serrated polyp 10/2018    Anesthesia     pt states \"one new dr scraped my esophagus when they put the tube in and I started bleeding so they couldn't operate\"     Back pain 06/01/2022    0/10    Blood clotting disorder (HCC) 2012    clot in leg    Bowel habit changes     constipation     Cancer (HCC)     skin, colon 2018    Cataract     belia IOL     Chronic back pain greater than 3 months duration     Deep vein thrombosis (HCC) 03/08/2016    First occurrence in [place] in late 1970s Second occurrence further up in " [place] in 2012, has been on AC since     Essential hypertension, benign 06/27/2012    GERD (gastroesophageal reflux disease) 06/27/2012    Heart murmur     High cholesterol     Hyperlipidemia     Hypertension     hx of, not currently     Impaired fasting glucose 11/02/2017    Melanoma ([facility])     Mild aortic stenosis     Seeing Dr. Van    Other and unspecified hyperlipidemia 06/27/2012    Prediabetes     Protein S deficiency ([facility]) 11/02/2017    Noted in lab work 2013 as a part of work up at [facility]     Rheumatoid arthritis involving multiple sites with positive rheumatoid factor ([facility]) 03/14/2016    Dr. Escoto    Rheumatoid nodule ([facility]) 07/25/2017    Right bundle branch block 06/27/2012    pt denies     Urinary incontinence 11/02/2017     SURGICAL HISTORY  Past Surgical History:   Procedure Laterality Date    LUMBAR FUSION O-ARM  09/21/2022    Procedure: L3-4 and L4-5 decompressive laminectomy, bilateral foraminotomies and L3-4-5 posterior lumbar instrumented fusion;  Surgeon: Rosa M Bonner M.D.;  Location: [facility];  Service: Neurosurgery    LUMBAR DECOMPRESSION  09/21/2022    Procedure: DECOMPRESSION, SPINE, LUMBAR;  Surgeon: Rosa M Bonner M.D.;  Location: [facility];  Service: Neurosurgery    LUMBAR LAMINECTOMY DISKECTOMY  09/21/2022    Procedure: LAMINECTOMY, SPINE, LUMBAR, WITH DISCECTOMY;  Surgeon: Rosa M Bonner M.D.;  Location: [facility];  Service: Neurosurgery    FORAMINOTOMY  09/21/2022    Procedure: FORAMINOTOMY, SPINE;  Surgeon: Rosa M Bonner M.D.;  Location: [facility];  Service: Neurosurgery    MN INJECTION,SACROILIAC JOINT Right 07/12/2022    Procedure: RIGHT sacroiliac joint injection with fluoroscopic guidance.;  Surgeon: Indira Lee M.D.;  [facility];  Service: Pain Management    MN REMOVE ARMPITS LYMPH NODES COMPLT Left 06/07/2022    Procedure: LYMPHADENECTOMY, AXILLARY;  Surgeon: Bernardino Torres M.D.;   Location: [facility];  Service: General    BLOCK EPIDURAL STEROID INJECTION Right 05/31/2022    Procedure: RIGHT L4-5 and L5-S1 transforaminal epidural steroid injection with fluoroscopic guidance.;  Surgeon: Indira Lee M.D.;  [facility]: [facility];  Service: Pain Management    BLOCK EPIDURAL STEROID INJECTION Right 05/10/2022    Procedure: Lumbar L4-5 interlaminar epidural steroid injection;  Surgeon: Indira Lee M.D.;  [facility];  Service: Pain Management    WIDE EXCISION, LESION, HEAD AND NECK REGION Left 03/29/2022    Procedure: WIDE EXCISION, LESION, HEAD AND NECK REGION - RADICAL MALIGNANT MELANOMA OF LEFT CHEST;  Surgeon: Bernardino Torres M.D.;  Location: [facility];  Service: General    LUMBAR MEDIAL BRANCH BLOCKS Bilateral 12/15/2020    Procedure: BLOCK, NERVE, SPINAL, LUMBAR, POSTERIOR RAMUS, MEDIAL BRANCH;  Surgeon: Chris Cooper M.D.;  Location: [facility];  Service: Pain Management    IRRIGATION & DEBRIDEMENT ORTHO Bilateral 07/31/2020    Procedure: IRRIGATION AND DEBRIDEMENT, WOUND - KNEE;  Surgeon: Roosevelt Saucedo M.D.;  Location: SUR[facility]ERY [facility];  Service: Orthopedics    HEMICOLECTOMY Right 12/13/2018    Procedure: OPEN RIGHT HEMICOLECTOMY;  Surgeon: Dash Bhagat M.D.;  Location: SURGERY [facility];  Service: General    INGUINAL HERNIA REPAIR Right 09/23/2016    Procedure: INGUINAL HERNIA REPAIR - PRIMARY;  Surgeon: Mary Medina M.D.;  Location: SURGERY [facility];  Service:     OTHER  08/12/2014    peroneal nerve surgery Dr. Castro     OTHER ORTHOPEDIC SURGERY  2011    meniscus repair    ARTHROPLASTY Left     hip replacement    CHOLECYSTECTOMY      HYSTERECTOMY, TOTAL ABDOMINAL  1970's    KNEE ARTHROPLASTY TOTAL Left     OTHER Left     Upper chest, skin cancer removed    ROTATOR CUFF REPAIR Bilateral      FAMILY HISTORY  Family History   Problem Relation Age of  Onset    Cancer Mother         stomach- ruptured appendix    Cancer Father         colon    Leukemia Father         many exposure, worked for [employer] Sister     Heart Disease Brother         s/p stent     Other Son         on blood thinner, unclear etiology    Clotting Disorder Neg Hx      SOCIAL HISTORY   reports that she has never smoked. She has never used smokeless tobacco. She reports current alcohol use. She reports that she does not use drugs.    CURRENT MEDICATIONS  Discharge Medication List as of 5/24/2023  9:37 PM        CONTINUE these medications which have NOT CHANGED    Details   omeprazole (PRILOSEC) 20 MG delayed-release capsule Take 1 Capsule by mouth every day., Disp-90 Capsule, R-3, Normal      amLODIPine (NORVASC) 2.5 MG Tab Take 1 Tablet by mouth at bedtime., Disp-90 Tablet, R-3, Normal      atorvastatin (LIPITOR) 20 MG Tab Take 1 Tablet by mouth every evening., Disp-90 Tablet, R-3, Normal      gabapentin (NEURONTIN) 300 MG Cap Take 300 mg by mouth 2 times a day. 300 mg in AM, 600 mg in PM, Historical Med      losartan (COZAAR) 50 MG Tab TAKE 1 TABLET BY MOUTH EVERY DAY**Patient requests 90 days supply**Disp-90 Tablet, R-3, Normal      Multiple Vitamins-Minerals (PRESERVISION AREDS 2 PO) Take 1 Capsule by mouth 2 times a day., Historical Med      Calcium Carbonate 600 MG Tab Take 600 mg by mouth every morning., Historical Med      rivaroxaban (XARELTO) 20 MG Tab tablet Take 1 Tablet by mouth with dinner.This rx was submitted by a pharmacist working under a collaborative practice agreement.Disp-90 Tablet, R-3, Normal      polyethylene glycol/lytes (MIRALAX) 17 g Pack Take 1 Packet by mouth 1 time a day as needed (if sennosides and docusate ineffective after 24 hours)., R-3, No Print      acetaminophen (TYLENOL) 500 MG Tab Take 650 mg by mouth 3 times a day as needed. Indications: Pain, Historical Med      melatonin 5 mg Tab Take 10 mg by mouth every evening., Historical Med  "     vitamin D (CHOLECALCIFEROL) 1000 UNIT Tab Take 1,000 Units by mouth every morning., Historical Med           ALLERGIES  Wound dressing adhesive    PHYSICAL EXAM  /85   Pulse 83   Temp 36.2 °C (97.1 °F) (Temporal)   Resp 16   Ht 1.6 m (5' 3\")   Wt 72.1 kg (158 lb 15.2 oz)   LMP  (LMP Unknown)   SpO2 95%   BMI 28.16 kg/m²   Constitutional: Alert in no apparent distress.  HENT: No signs of trauma, Bilateral external ears normal, Nose normal.   Eyes: Pupils are equal and reactive, Conjunctiva normal, Non-icteric.   Neck: Normal range of motion, No tenderness, Supple, No stridor.   Lymphatic: No lymphadenopathy noted.   Cardiovascular: Regular rate and rhythm, no murmurs.   Thorax & Lungs: Normal breath sounds, No respiratory distress, No wheezing, No chest tenderness.   Abdomen: Bowel sounds normal, Soft, No tenderness, No masses, No pulsatile masses. No peritoneal signs.  Skin: Warm, Dry, No erythema, No rash.   Back: No bony tenderness, No CVA tenderness.   Extremities: Intact distal pulses, No tenderness, No cyanosis. Pitting edema in bilateral lower extremities: 1+ edema on left, Trace on right.  Musculoskeletal: Good range of motion in all major joints. No tenderness to palpation or major deformities noted.   Neurologic: Alert , Normal motor function, Normal sensory function, No focal deficits noted.   Psychiatric: Affect normal, Judgment normal, Mood normal.     DIAGNOSTIC STUDIES    Labs:   Results for orders placed or performed during the hospital encounter of 05/24/23   CBC WITH DIFFERENTIAL   Result Value Ref Range    WBC 6.1 4.8 - 10.8 K/uL    RBC 3.95 (L) 4.20 - 5.40 M/uL    Hemoglobin 11.6 (L) 12.0 - 16.0 g/dL    Hematocrit 36.2 (L) 37.0 - 47.0 %    MCV 91.6 81.4 - 97.8 fL    MCH 29.4 27.0 - 33.0 pg    MCHC 32.0 (L) 32.2 - 35.5 g/dL    RDW 51.8 (H) 35.9 - 50.0 fL    Platelet Count 185 164 - 446 K/uL    MPV 11.9 9.0 - 12.9 fL    Neutrophils-Polys 61.50 44.00 - 72.00 %    Lymphocytes 19.30 " (L) 22.00 - 41.00 %    Monocytes 9.90 0.00 - 13.40 %    Eosinophils 8.10 (H) 0.00 - 6.90 %    Basophils 1.00 0.00 - 1.80 %    Immature Granulocytes 0.20 0.00 - 0.90 %    Nucleated RBC 0.00 0.00 - 0.20 /100 WBC    Neutrophils (Absolute) 3.73 1.82 - 7.42 K/uL    Lymphs (Absolute) 1.17 1.00 - 4.80 K/uL    Monos (Absolute) 0.60 0.00 - 0.85 K/uL    Eos (Absolute) 0.49 0.00 - 0.51 K/uL    Baso (Absolute) 0.06 0.00 - 0.12 K/uL    Immature Granulocytes (abs) 0.01 0.00 - 0.11 K/uL    NRBC (Absolute) 0.00 K/uL   BASIC METABOLIC PANEL   Result Value Ref Range    Sodium 143 135 - 145 mmol/L    Potassium 5.1 3.6 - 5.5 mmol/L    Chloride 109 96 - 112 mmol/L    Co2 24 20 - 33 mmol/L    Glucose 119 (H) 65 - 99 mg/dL    Bun 31 (H) 8 - 22 mg/dL    Creatinine 0.92 0.50 - 1.40 mg/dL    Calcium 8.6 8.5 - 10.5 mg/dL    Anion Gap 10.0 7.0 - 16.0   ESTIMATED GFR   Result Value Ref Range    GFR (CKD-EPI) 60 >60 mL/min/1.73 m 2     Radiology:   The attending emergency physician has independently interpreted the diagnostic imaging associated with this visit and am waiting the final reading from the radiologist.   Radiologist interpretation:   US-EXTREMITY VENOUS LOWER BILAT   Final Result        COURSE & MEDICAL DECISION MAKING     ED Observation Status? Yes; I am placing the patient in to an observation status due to a diagnostic uncertainty as well as therapeutic intensity. Patient placed in observation status at 8:12 PM, 5/24/2023.     Observation plan is as follows: Serial re-evaluation pending lab and imaging results.    Upon Reevaluation, the patient's condition has: Improved; and will be discharged.    Patient discharged from ED Observation status at 9:45 PM 05/24/23.     INITIAL ASSESSMENT, COURSE AND PLAN  Care Narrative:     8:03 PM - Patient seen and examined at bedside. Discussed plan of care, including labs and ultrasound to rule out blood clots. Patient agrees to the plan of care. Ordered for BMP, CBC w/ Diff, and US Lower  Bilateral Extremity to evaluate her symptoms.     9:45 PM - Patient was reevaluated at bedside. Discussed lab and radiology results with the patient. She states she feels improved. I informed her of my plan to discharge her, which she verbalized agreement to.       Patient with concern for DVT.  At this time we will evaluate for new acute DVT as well as evaluate for electrolyte derangements and anemia and then reassess.    Patient was found to have slightly elevated BUN.  Is a chronic DVT in the left but no acute DVT.  The patient has a mild anemia but this is baseline.  We will discharge the patient home with strict return precautions and follow-up.    DISPOSITION AND DISCUSSIONS  I have discussed management of the patient with the following physicians and HAYLEE's:  None    Discussion of management with other QHP or appropriate source(s): None     Escalation of care considered, and ultimately not performed: acute inpatient care management, however at this time, the patient is most appropriate for outpatient management.    Barriers to care at this time, including but not limited to:  No barriers noted during this encounter .     Decision tools and prescription drugs considered including, but not limited to:  No need for anticoagulation at this time. .    Patient will be discharged home.    FOLLOW UP:  CRISTINO Horner  [address] [id number] [phone]    In 2 days      OUTPATIENT MEDICATIONS:  Discharge Medication List as of 5/24/2023  9:37 PM        FINAL DIAGNOSIS  1. Peripheral edema      William AGARWAL (Kenneth), am scribing for, and in the presence of, Neo Webber M.D..    Electronically signed by: William Torres (Kenneth), 5/24/2023    Neo AGARWAL M.D. personally performed the services described in this documentation, as scribed by William Torres in my presence, and it is both accurate and complete.    The note accurately reflects work and decisions made by me.  Neo SCHAEFFER  DIONISIO Webber  5/25/2023  3:41 AM

## 2023-05-26 ENCOUNTER — APPOINTMENT (OUTPATIENT)
Dept: RADIOLOGY | Facility: MEDICAL CENTER | Age: 88
End: 2023-05-26
Attending: NURSE PRACTITIONER
Payer: MEDICARE

## 2023-05-26 ENCOUNTER — HOSPITAL ENCOUNTER (OUTPATIENT)
Facility: MEDICAL CENTER | Age: 88
End: 2023-05-26
Attending: FAMILY MEDICINE | Admitting: FAMILY MEDICINE
Payer: MEDICARE

## 2023-05-26 VITALS
SYSTOLIC BLOOD PRESSURE: 157 MMHG | RESPIRATION RATE: 16 BRPM | HEIGHT: 63 IN | BODY MASS INDEX: 27.3 KG/M2 | DIASTOLIC BLOOD PRESSURE: 71 MMHG | HEART RATE: 82 BPM | OXYGEN SATURATION: 94 % | TEMPERATURE: 98.2 F | WEIGHT: 154.1 LBS

## 2023-05-26 DIAGNOSIS — Z95.828 PRESENCE OF IVC FILTER: ICD-10-CM

## 2023-05-26 LAB
EKG IMPRESSION: NORMAL
INR PPP: 1.1 (ref 0.87–1.13)
PROTHROMBIN TIME: 14.1 SEC (ref 12–14.6)

## 2023-05-26 PROCEDURE — 93010 ELECTROCARDIOGRAM REPORT: CPT | Performed by: INTERNAL MEDICINE

## 2023-05-26 PROCEDURE — 160002 HCHG RECOVERY MINUTES (STAT)

## 2023-05-26 PROCEDURE — 36415 COLL VENOUS BLD VENIPUNCTURE: CPT

## 2023-05-26 PROCEDURE — 99153 MOD SED SAME PHYS/QHP EA: CPT

## 2023-05-26 PROCEDURE — 160046 HCHG PACU - 1ST 60 MINS PHASE II

## 2023-05-26 PROCEDURE — 160035 HCHG PACU - 1ST 60 MINS PHASE I

## 2023-05-26 PROCEDURE — 700105 HCHG RX REV CODE 258: Performed by: RADIOLOGY

## 2023-05-26 PROCEDURE — 85610 PROTHROMBIN TIME: CPT

## 2023-05-26 PROCEDURE — 700111 HCHG RX REV CODE 636 W/ 250 OVERRIDE (IP)

## 2023-05-26 PROCEDURE — 700111 HCHG RX REV CODE 636 W/ 250 OVERRIDE (IP): Performed by: STUDENT IN AN ORGANIZED HEALTH CARE EDUCATION/TRAINING PROGRAM

## 2023-05-26 PROCEDURE — 160036 HCHG PACU - EA ADDL 30 MINS PHASE I

## 2023-05-26 PROCEDURE — 93005 ELECTROCARDIOGRAM TRACING: CPT | Performed by: STUDENT IN AN ORGANIZED HEALTH CARE EDUCATION/TRAINING PROGRAM

## 2023-05-26 PROCEDURE — 700117 HCHG RX CONTRAST REV CODE 255: Performed by: STUDENT IN AN ORGANIZED HEALTH CARE EDUCATION/TRAINING PROGRAM

## 2023-05-26 RX ORDER — SODIUM CHLORIDE 9 MG/ML
1000 INJECTION, SOLUTION INTRAVENOUS ONCE
Status: COMPLETED | OUTPATIENT
Start: 2023-05-26 | End: 2023-05-26

## 2023-05-26 RX ORDER — SODIUM CHLORIDE 9 MG/ML
500 INJECTION, SOLUTION INTRAVENOUS
Status: ACTIVE | OUTPATIENT
Start: 2023-05-26 | End: 2023-05-26

## 2023-05-26 RX ORDER — SODIUM CHLORIDE, SODIUM LACTATE, POTASSIUM CHLORIDE, CALCIUM CHLORIDE 600; 310; 30; 20 MG/100ML; MG/100ML; MG/100ML; MG/100ML
INJECTION, SOLUTION INTRAVENOUS CONTINUOUS
Status: DISCONTINUED | OUTPATIENT
Start: 2023-05-26 | End: 2023-05-26 | Stop reason: CLARIF

## 2023-05-26 RX ORDER — MIDAZOLAM HYDROCHLORIDE 1 MG/ML
INJECTION INTRAMUSCULAR; INTRAVENOUS
Status: COMPLETED
Start: 2023-05-26 | End: 2023-05-26

## 2023-05-26 RX ORDER — HYDRALAZINE HYDROCHLORIDE 20 MG/ML
5 INJECTION INTRAMUSCULAR; INTRAVENOUS ONCE
Status: COMPLETED | OUTPATIENT
Start: 2023-05-26 | End: 2023-05-26

## 2023-05-26 RX ORDER — MIDAZOLAM HYDROCHLORIDE 1 MG/ML
.5-2 INJECTION INTRAMUSCULAR; INTRAVENOUS PRN
Status: ACTIVE | OUTPATIENT
Start: 2023-05-26 | End: 2023-05-26

## 2023-05-26 RX ORDER — ONDANSETRON 2 MG/ML
4 INJECTION INTRAMUSCULAR; INTRAVENOUS PRN
Status: ACTIVE | OUTPATIENT
Start: 2023-05-26 | End: 2023-05-26

## 2023-05-26 RX ADMIN — MIDAZOLAM 1 MG: 1 INJECTION, SOLUTION INTRAMUSCULAR; INTRAVENOUS at 16:54

## 2023-05-26 RX ADMIN — FENTANYL CITRATE 50 MCG: 50 INJECTION, SOLUTION INTRAMUSCULAR; INTRAVENOUS at 16:54

## 2023-05-26 RX ADMIN — IOHEXOL 40 ML: 300 INJECTION, SOLUTION INTRAVENOUS at 17:08

## 2023-05-26 RX ADMIN — FENTANYL CITRATE 50 MCG: 50 INJECTION, SOLUTION INTRAMUSCULAR; INTRAVENOUS at 16:39

## 2023-05-26 RX ADMIN — MIDAZOLAM 1 MG: 1 INJECTION, SOLUTION INTRAMUSCULAR; INTRAVENOUS at 16:39

## 2023-05-26 RX ADMIN — HYDRALAZINE HYDROCHLORIDE 5 MG: 20 INJECTION, SOLUTION INTRAMUSCULAR; INTRAVENOUS at 19:44

## 2023-05-26 RX ADMIN — MIDAZOLAM HYDROCHLORIDE 1 MG: 1 INJECTION, SOLUTION INTRAMUSCULAR; INTRAVENOUS at 16:39

## 2023-05-26 RX ADMIN — SODIUM CHLORIDE 1000 ML: 9 INJECTION, SOLUTION INTRAVENOUS at 14:30

## 2023-05-26 ASSESSMENT — FIBROSIS 4 INDEX: FIB4 SCORE: 2.19

## 2023-05-26 ASSESSMENT — PAIN DESCRIPTION - PAIN TYPE
TYPE: SURGICAL PAIN
TYPE: SURGICAL PAIN

## 2023-05-26 NOTE — PROGRESS NOTES
Patient underwent a IVC Filter Retrieval by Dr. Mendez. Time out performed at 1640. Procedure site marked by MD and verified using imaging guidance. Pt placed in supine position for procedure, pressure points checked, padded, and monitored appropriately. Vitals taken every 5 minutes and remained stable during procedure (see doc flowsheet). CO2 waveform capnography utilized for patient monitoring and remained WNL throughout procedure. A gauze and tegaderm dressing placed over surgical site, CDI. Report called to PACU, RN. Pt transported via gurney with RN on monitor to PACU.

## 2023-05-26 NOTE — TELEPHONE ENCOUNTER
Haseeb Serrano M.D.  You 22 hours ago (6:00 PM)       I recommend completing the echocardiogram before undergoing elective spine surgery.     Thanks   BE       Gen: NAD, AOx3, well appearing, nontoxic  Head: NCAT  Lung: CTAB, no respiratory distress, no wheezing, rales, rhonchi  CV: normal s1/s2, rrr, no murmurs, Normal perfusion  Abd: soft, NTND  MSK: No edema, no visible deformities, full range of motion in all 4 extremities  Neuro: No focal neurologic deficits  Skin: No rash   Psych: normal affect

## 2023-05-27 NOTE — OR NURSING
Patient recovered well post op. A&Ox4. VSS, room air. Surgical sites CDI. Surgical pain managed. Patient able to drink fluids without Nausea and vomiting. PT belongings at the bedside claimed by the patient. Spouse updated and discussed plan of care. Report called to phase two RN. 2 hour bed rest complete at 1930 patient SBP elevated to the 180s.  notified via voalte. New order for 5MG IV hydralazine given at this time. SBP down to 140. Patient taken to phase two after using the restroom prior to discharge from the PACU.

## 2023-05-27 NOTE — OR NURSING
Arrived from PACU   Pt is A&Ox4, VSS; denies N/V; states pain is at tolerable level. Dressing right neck; gauze and tegaderm; mild oozing noted - otherwise CDI     D/c orders received. IV dc'd.   Pt changed into clothing with assistance.   Discharge reviewed  Pt and family verbalized understanding and questions answered.   Patient states ready to d/c home.  Pt dc'd in w/c with family .

## 2023-05-27 NOTE — OR SURGEON
Immediate Post- Operative Note        Findings: IVC Filter      Procedure(s): Retrieval      Estimated Blood Loss: Less than 5 ml        Complications: None            5/26/2023     5:12 PM     Thor Mendez M.D.

## 2023-05-27 NOTE — DISCHARGE INSTRUCTIONS
HOME CARE INSTRUCTIONS    ACTIVITY: Rest and take it easy for the first 24 hours.  A responsible adult is recommended to remain with you during that time.  It is normal to feel sleepy.  We encourage you to not do anything that requires balance, judgment or coordination.    FOR 24 HOURS DO NOT:  Drive, operate machinery or run household appliances.  Drink beer or alcoholic beverages.  Make important decisions or sign legal documents.    SPECIAL INSTRUCTIONS:   Inferior Vena Cava Filter Removal, Care After  This sheet gives you information about how to care for yourself after your procedure. Your health care provider may also give you more specific instructions. If you have problems or questions, contact your health care provider.  What can I expect after the procedure?  After the procedure, it is common to have:  Mild pain and bruising around your incision in your neck or groin.  Fatigue.  Follow these instructions at home:  Incision care    Follow instructions from your health care provider about how to take care of your incision. Make sure you:  Wash your hands with soap and water before you change your bandage (dressing). If soap and water are not available, use hand .  Change your dressing as told by your health care provider.  Check your incision area every day for signs of infection. Check for:  Redness, swelling, or more pain.  Fluid or blood.  Warmth.  Pus or a bad smell.  General instructions  Take over-the-counter and prescription medicines only as told by your health care provider.  Do not take baths, swim, or use a hot tub until your health care provider approves. Ask your health care provider if you may take showers. You may only be allowed to take sponge baths.  Do not drive for 24 hours if you were given a medicine to help you relax (sedative) during your procedure.  Return to your normal activities as told by your health care provider. Ask your health care provider what activities are safe  for you.  Keep all follow-up visits as told by your health care provider. This is important.  Contact a health care provider if:  You have chills or a fever.  You have redness, swelling, or more pain around your incision.  Your incision feels warm to the touch.  You have pus or a bad smell coming from your incision.  Get help right away if:  You have blood coming from your incision (active bleeding).  If you have bleeding from the incision site, lie down, apply pressure to the area with a clean cloth or gauze, and get help right away.  You have chest pain.  You have difficulty breathing.  Summary  Follow instructions from your health care provider about how to take care of your incision.  Return to your normal activities as told by your health care provider.  Check your incision area every day for signs of infection.  Get help right away if you have active bleeding, chest pain, or trouble breathing.  This information is not intended to replace advice given to you by your health care provider. Make sure you discuss any questions you have with your health care provider.  Document Released: 06/28/2018 Document Revised: 11/30/2018 Document Reviewed: 06/28/2018  [employer] Patient Education © 2020 [employer]      DIET: To avoid nausea, slowly advance diet as tolerated, avoiding spicy or greasy foods for the first day.  Add more substantial food to your diet according to your physician's instructions.  Babies can be fed formula or breast milk as soon as they are hungry.  INCREASE FLUIDS AND FIBER TO AVOID CONSTIPATION.    SURGICAL DRESSING/BATHING: Dressing can be removed after 24-48 hours. OK to shower after dressing removal. No submerging in water until follow-up visit with MD     MEDICATIONS: Resume taking daily medication.  Take prescribed pain medication with food.  If no medication is prescribed, you may take non-aspirin pain medication if needed.  PAIN MEDICATION CAN BE VERY CONSTIPATING.  Take a stool softener or  laxative such as senokot, pericolace, or milk of magnesia if needed.      A follow-up appointment should be arranged with your doctor in 1-2 weeks ; call to schedule.    You should CALL YOUR PHYSICIAN if you develop:  Fever greater than 101 degrees F.  Pain not relieved by medication, or persistent nausea or vomiting.  Excessive bleeding (blood soaking through dressing) or unexpected drainage from the wound.  Extreme redness or swelling around the incision site, drainage of pus or foul smelling drainage.  Inability to urinate or empty your bladder within 8 hours.  Problems with breathing or chest pain.    You should call 911 if you develop problems with breathing or chest pain.  If you are unable to contact your doctor or surgical center, you should go to the nearest emergency room or urgent care center.  Physician's telephone #: Dr Guidry [phone]    MILD FLU-LIKE SYMPTOMS ARE NORMAL.  YOU MAY EXPERIENCE GENERALIZED MUSCLE ACHES, THROAT IRRITATION, HEADACHE AND/OR SOME NAUSEA.    If any questions arise, call your doctor.  If your doctor is not available, please feel free to call the Surgical Center at [phone].  The Center is open Monday through Friday from 7AM to 7PM.      A registered nurse may call you a few days after your surgery to see how you are doing after your procedure.    You may also receive a survey in the mail within the next two weeks and we ask that you take a few moments to complete the survey and return it to us.  Our goal is to provide you with very good care and we value your comments.     Depression / Suicide Risk    As you are discharged from this [facility] facility, it is important to learn how to keep safe from harming yourself.    Recognize the warning signs:  Abrupt changes in personality, positive or negative- including increase in energy   Giving away possessions  Change in eating patterns- significant weight changes-  positive or negative  Change in sleeping patterns-  unable to sleep or sleeping all the time   Unwillingness or inability to communicate  Depression  Unusual sadness, discouragement and loneliness  Talk of wanting to die  Neglect of personal appearance   Rebelliousness- reckless behavior  Withdrawal from people/activities they love  Confusion- inability to concentrate     If you or a loved one observes any of these behaviors or has concerns about self-harm, here's what you can do:  Talk about it- your feelings and reasons for harming yourself  Remove any means that you might use to hurt yourself (examples: pills, rope, extension cords, firearm)  Get professional help from the community (Mental Health, Substance Abuse, psychological counseling)  Do not be alone:Call your Safe Contact- someone whom you trust who will be there for you.  Call your local CRISIS HOTLINE 082-6092 or 631-314-0196  Call your local Children's Mobile Crisis Response Team [place] (342) 288-6311 or www.[employer]  Call the toll free National Suicide Prevention Hotlines   National Suicide Prevention Lifeline 005-793-MBNW (2312)  [place] [facility] Line Network 800-SUICIDE (000-8306)    I acknowledge receipt and understanding of these Home Care instructions.

## 2023-05-30 ENCOUNTER — TELEPHONE (OUTPATIENT)
Dept: VASCULAR LAB | Facility: MEDICAL CENTER | Age: 88
End: 2023-05-30
Payer: MEDICARE

## 2023-06-06 ENCOUNTER — HOSPITAL ENCOUNTER (OUTPATIENT)
Facility: MEDICAL CENTER | Age: 88
End: 2023-06-06
Attending: NURSE PRACTITIONER
Payer: MEDICARE

## 2023-06-06 LAB
ALBUMIN SERPL BCP-MCNC: 4.1 G/DL (ref 3.2–4.9)
ALBUMIN/GLOB SERPL: 1.8 G/DL
ALP SERPL-CCNC: 289 U/L (ref 30–99)
ALT SERPL-CCNC: 123 U/L (ref 2–50)
ANION GAP SERPL CALC-SCNC: 10 MMOL/L (ref 7–16)
AST SERPL-CCNC: 126 U/L (ref 12–45)
BILIRUB SERPL-MCNC: 0.4 MG/DL (ref 0.1–1.5)
BUN SERPL-MCNC: 22 MG/DL (ref 8–22)
CALCIUM ALBUM COR SERPL-MCNC: 8.3 MG/DL (ref 8.5–10.5)
CALCIUM SERPL-MCNC: 8.4 MG/DL (ref 8.5–10.5)
CHLORIDE SERPL-SCNC: 105 MMOL/L (ref 96–112)
CO2 SERPL-SCNC: 27 MMOL/L (ref 20–33)
CREAT SERPL-MCNC: 0.85 MG/DL (ref 0.5–1.4)
GFR SERPLBLD CREATININE-BSD FMLA CKD-EPI: 66 ML/MIN/1.73 M 2
GLOBULIN SER CALC-MCNC: 2.3 G/DL (ref 1.9–3.5)
GLUCOSE SERPL-MCNC: 92 MG/DL (ref 65–99)
POTASSIUM SERPL-SCNC: 5.1 MMOL/L (ref 3.6–5.5)
PROT SERPL-MCNC: 6.4 G/DL (ref 6–8.2)
SODIUM SERPL-SCNC: 142 MMOL/L (ref 135–145)

## 2023-06-06 PROCEDURE — 80053 COMPREHEN METABOLIC PANEL: CPT

## 2023-06-08 ENCOUNTER — HOSPITAL ENCOUNTER (OUTPATIENT)
Dept: CARDIOLOGY | Facility: MEDICAL CENTER | Age: 88
End: 2023-06-08
Attending: INTERNAL MEDICINE
Payer: MEDICARE

## 2023-06-08 DIAGNOSIS — I35.0 NONRHEUMATIC AORTIC VALVE STENOSIS: ICD-10-CM

## 2023-06-08 PROCEDURE — 93306 TTE W/DOPPLER COMPLETE: CPT

## 2023-06-09 LAB
LV EJECT FRACT  99904: 75
LV EJECT FRACT MOD 2C 99903: 74.58
LV EJECT FRACT MOD 4C 99902: 82.94
LV EJECT FRACT MOD BP 99901: 77.93

## 2023-06-09 PROCEDURE — 93306 TTE W/DOPPLER COMPLETE: CPT | Mod: 26 | Performed by: INTERNAL MEDICINE

## 2023-06-12 ENCOUNTER — TELEPHONE (OUTPATIENT)
Dept: CARDIOLOGY | Facility: MEDICAL CENTER | Age: 88
End: 2023-06-12
Payer: MEDICARE

## 2023-06-13 ENCOUNTER — DOCUMENTATION (OUTPATIENT)
Dept: CARDIOLOGY | Facility: MEDICAL CENTER | Age: 88
End: 2023-06-13

## 2023-06-13 ENCOUNTER — OFFICE VISIT (OUTPATIENT)
Dept: CARDIOLOGY | Facility: MEDICAL CENTER | Age: 88
End: 2023-06-13
Attending: INTERNAL MEDICINE
Payer: MEDICARE

## 2023-06-13 VITALS
SYSTOLIC BLOOD PRESSURE: 136 MMHG | BODY MASS INDEX: 27.82 KG/M2 | HEART RATE: 82 BPM | RESPIRATION RATE: 16 BRPM | WEIGHT: 157 LBS | DIASTOLIC BLOOD PRESSURE: 84 MMHG | HEIGHT: 63 IN | OXYGEN SATURATION: 96 %

## 2023-06-13 DIAGNOSIS — I35.0 NONRHEUMATIC AORTIC (VALVE) STENOSIS: ICD-10-CM

## 2023-06-13 DIAGNOSIS — I35.0 SEVERE AORTIC STENOSIS: ICD-10-CM

## 2023-06-13 DIAGNOSIS — C43.59 MALIGNANT MELANOMA OF CHEST WALL (HCC): ICD-10-CM

## 2023-06-13 DIAGNOSIS — I35.0 NONRHEUMATIC AORTIC VALVE STENOSIS: ICD-10-CM

## 2023-06-13 DIAGNOSIS — Z01.810 PRE-PROCEDURAL CARDIOVASCULAR EXAMINATION: ICD-10-CM

## 2023-06-13 DIAGNOSIS — I45.10 RIGHT BUNDLE BRANCH BLOCK: ICD-10-CM

## 2023-06-13 DIAGNOSIS — I82.409 RECURRENT ACUTE DEEP VEIN THROMBOSIS (DVT) OF LOWER EXTREMITY, UNSPECIFIED LATERALITY (HCC): ICD-10-CM

## 2023-06-13 PROCEDURE — 99214 OFFICE O/P EST MOD 30 MIN: CPT | Performed by: INTERNAL MEDICINE

## 2023-06-13 PROCEDURE — 99212 OFFICE O/P EST SF 10 MIN: CPT | Performed by: INTERNAL MEDICINE

## 2023-06-13 PROCEDURE — 3079F DIAST BP 80-89 MM HG: CPT | Performed by: INTERNAL MEDICINE

## 2023-06-13 PROCEDURE — 3075F SYST BP GE 130 - 139MM HG: CPT | Performed by: INTERNAL MEDICINE

## 2023-06-13 PROCEDURE — 99213 OFFICE O/P EST LOW 20 MIN: CPT | Performed by: INTERNAL MEDICINE

## 2023-06-13 RX ORDER — LIDOCAINE 50 MG/G
PATCH TOPICAL
Status: ON HOLD | COMMUNITY
Start: 2023-05-18

## 2023-06-13 RX ORDER — AMOXICILLIN 500 MG/1
CAPSULE ORAL
COMMUNITY
Start: 2023-04-05 | End: 2023-06-13

## 2023-06-13 RX ORDER — SODIUM FLUORIDE 5 MG/G
CREAM DENTAL
Status: ON HOLD | COMMUNITY
Start: 2023-04-05

## 2023-06-13 ASSESSMENT — FIBROSIS 4 INDEX: FIB4 SCORE: 5.28

## 2023-06-13 NOTE — Clinical Note
Can you also inquire with her oncologist regarding the status of melanoma and the suitability of proceeding with TAVR.

## 2023-06-13 NOTE — PROGRESS NOTES
Met with patient in office following appointment with Dr. Serrano. Discussed plan of care moving forward.    Patient scheduled for pre TAVR CT. Instructions, location, and time reviewed with patient.    Patient aware she will receive call from [facility] to schedule angiogram.    Business card provided to patient for any questions or concerns moving forward.

## 2023-06-13 NOTE — PROGRESS NOTES
"CARDIOLOGY STRUCTURAL HEART FOLLOWUP    PCP: KINSEY Horner.  REFERRING: Darron Van MD    1. Nonrheumatic aortic valve stenosis    2. Right bundle branch block    3. 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 Malignant melanoma of chest wall (HCC)        Marry Mathur has class II-III, stage D3 aortic stenosis.  I advised further assessment with a right and left heart catheterization and will arrange a TAVR CTA.  Given the past experience with DVT after interrupting anticoagulation, I will proceed with catheterization with uninterrupted Xarelto.  We did discuss the possibility of adding clopidogrel if PCI were needed.      She will subsequently return to clinic for further discussion regarding the nuances of valve replacement and also be assessed by CT surgery.    I will also contact oncology for an assessment of the cancer status and suitability of moving forward with valve replacement..    Follow up: 6 weeks    History: Marry Mathur is a 87 y.o. 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    Since our last visit she completed an echocardiogram which showed stable ventricular function as well as aortic valve mean gradient in the low 30s.  Subjectively, the aortic valve did appear abnormal with severely restricted leaflet motion.  The HOLLEY was calculated at 0.6.    She also notes gradual exertional decline and inability to work in her garden or in her orchard.  She has attributed this to Opdivo-prescribed for melanoma and is decided to discontinue therapy.      PE:  /84 (BP Location: Left arm, Patient Position: Sitting, BP Cuff Size: Adult)   Pulse 82   Resp 16   Ht 1.6 m (5' 3\")   Wt 71.2 kg (157 lb)   LMP  (LMP Unknown)   SpO2 96%   BMI 27.81 kg/m²   GEN: " "NAD  CARDIAC: +MAYUR regular  VASCULATURE: diminished and delayed carotid pulse  RESP: Clear to auscultation bilaterally  ABD: Soft, non-tender, non-distended  EXT: Trace lower extremity edema  NEURO: No focal deficit    Past Medical History:   Diagnosis Date    Adenocarcinoma of colon (HCC) 10/30/2018    From sessile serrated polyp 10/2018    Anesthesia     pt states \"one new dr scraped my esophagus when they put the tube in and I started bleeding so they couldn't operate\"     Back pain 06/01/2022    0/10    Blood clotting disorder (HCC) 2012    clot in leg    Bowel habit changes     constipation     Cancer (HCC)     skin, colon 2018    Cataract     belia IOL     Chronic back pain greater than 3 months duration     Deep vein thrombosis (HCC) 03/08/2016    First occurrence in [facility] in late 1970s Second occurrence further up in [facility] in 2012, has been on AC since     Essential hypertension, benign 06/27/2012    GERD (gastroesophageal reflux disease) 06/27/2012    Heart murmur     High cholesterol     Hyperlipidemia     Hypertension     hx of, not currently     Impaired fasting glucose 11/02/2017    Melanoma (HCC)     Mild aortic stenosis     Seeing Dr. Van    Other and unspecified hyperlipidemia 06/27/2012    Prediabetes     Protein S deficiency ([facility]) 11/02/2017    Noted in lab work 2013 as a part of work up at [facility]     Rheumatoid arthritis involving multiple sites with positive rheumatoid factor ([facility]) 03/14/2016    Dr. Escoto    Rheumatoid nodule ([facility]) 07/25/2017    Right bundle branch block 06/27/2012    pt denies     Urinary incontinence 11/02/2017     Allergies   Allergen Reactions    Wound Dressing Adhesive      Pt says band-aids cause skin reaction/rash if on for more than 2 days  Paper tape OK       Outpatient Encounter Medications as of 6/13/2023   Medication Sig Dispense Refill    lidocaine (LIDODERM) 5 % Patch APPLY 1 PATCH TO SKIN DAILY FOR 12 HOURS ON THEN 12 HOURS OFF      SODIUM FLUORIDE 5000 PLUS " 1.1 % Cream BRUSH WITH PEA SIZEED AMOUNT ONCE A DAY PREFERABLY BEFORE BEDTIME. SPIT OUT ALL EXCESS. DO NOT RINSE      omeprazole (PRILOSEC) 20 MG delayed-release capsule Take 1 Capsule by mouth every day. (Patient taking differently: Take 20 mg by mouth every morning.) 90 Capsule 3    amLODIPine (NORVASC) 2.5 MG Tab Take 1 Tablet by mouth at bedtime. 90 Tablet 3    atorvastatin (LIPITOR) 20 MG Tab Take 1 Tablet by mouth every evening. 90 Tablet 3    gabapentin (NEURONTIN) 300 MG Cap Take 300 mg by mouth 2 times a day. 300 mg in AM, 600 mg in PM      losartan (COZAAR) 50 MG Tab TAKE 1 TABLET BY MOUTH EVERY DAY (Patient taking differently: Take 50 mg by mouth at bedtime.) 90 Tablet 3    Multiple Vitamins-Minerals (PRESERVISION AREDS 2 PO) Take 1 Capsule by mouth 2 times a day.      Calcium Carbonate 600 MG Tab Take 600 mg by mouth every morning.      rivaroxaban (XARELTO) 20 MG Tab tablet Take 1 Tablet by mouth with dinner. 90 Tablet 3    polyethylene glycol/lytes (MIRALAX) 17 g Pack Take 1 Packet by mouth 1 time a day as needed (if sennosides and docusate ineffective after 24 hours).  3    acetaminophen (TYLENOL) 500 MG Tab Take 650 mg by mouth 3 times a day as needed. Indications: Pain      melatonin 5 mg Tab Take 10 mg by mouth every evening.      vitamin D (CHOLECALCIFEROL) 1000 UNIT Tab Take 1,000 Units by mouth every morning.      [DISCONTINUED] amoxicillin (AMOXIL) 500 MG Cap TAKE 4 CAPSULES BY MOUTH ONE HOUR PRIOR TO DENTAL TREATMENT (Patient not taking: Reported on 6/13/2023)       No facility-administered encounter medications on file as of 6/13/2023.     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      Social Determinants of Health     Financial Resource Strain: Not on file   Food Insecurity: Not on file   Transportation Needs: Not on file   Physical Activity: Not on file   Stress: Not on file   Social Connections: Not on file   Intimate Partner Violence: Not on file   Housing Stability: Not on file       Studies  Lab Results   Component Value Date/Time    CHOLSTRLTOT 176 03/24/2023 08:22 AM     (H) 03/24/2023 08:22 AM    HDL 56 03/24/2023 08:22 AM    TRIGLYCERIDE 96 03/24/2023 08:22 AM       Lab Results   Component Value Date/Time    SODIUM 142 06/06/2023 10:50 AM    POTASSIUM 5.1 06/06/2023 10:50 AM    CHLORIDE 105 06/06/2023 10:50 AM    CO2 27 06/06/2023 10:50 AM    GLUCOSE 92 06/06/2023 10:50 AM    BUN 22 06/06/2023 10:50 AM    CREATININE 0.85 06/06/2023 10:50 AM     Lab Results   Component Value Date/Time    ALKPHOSPHAT 289 (H) 06/06/2023 10:50 AM    ASTSGOT 126 (H) 06/06/2023 10:50 AM    ALTSGPT 123 (H) 06/06/2023 10:50 AM    TBILIRUBIN 0.4 06/06/2023 10:50 AM        Discussed major surgery with patient related risk factors-cardiac catheterization, TAVR with need for uninterrupted anticoagulation, right bundle branch block, age            Chief Complaint   Patient presents with    Aortic Stenosis     F/V Dx: Nonrheumatic aortic valve stenosis     ROS:   10 point review systems is otherwise negative except as per the HPI

## 2023-06-13 NOTE — TELEPHONE ENCOUNTER
Haseeb Serrano M.D.  You 19 hours ago (5:25 PM)       Surgical can be completed at moderate increased risk of perioperative cardiovascular complications.  I suggest procedures to be completed within the hospital and with cardiac anesthesia.     Thanks   BE

## 2023-06-14 ENCOUNTER — TELEPHONE (OUTPATIENT)
Dept: CARDIOLOGY | Facility: MEDICAL CENTER | Age: 88
End: 2023-06-14
Payer: MEDICARE

## 2023-06-14 NOTE — TELEPHONE ENCOUNTER
----- Message from Elizabeth Nam R.N. sent at 6/13/2023  3:24 PM PDT -----  Regarding: Pre TAVR Cath  Hi! Can you please call patient and schedule first available pre TAVR cath? Thank you!

## 2023-06-14 NOTE — TELEPHONE ENCOUNTER
Patient is scheduled on 6-29-23 for a Pre TAVR angio with . Patient was told to hold xarelto for 2 days prior and to check in at 6:00 for a 7:30 procedure. H&P was done on 6-13-23 by Dr. Serrano. Pre admit to call patient.

## 2023-06-15 ENCOUNTER — TELEPHONE (OUTPATIENT)
Dept: CARDIOLOGY | Facility: MEDICAL CENTER | Age: 88
End: 2023-06-15
Payer: MEDICARE

## 2023-06-15 NOTE — LETTER
PROCEDURE/SURGERY CLEARANCE FORM      Encounter Date: 06/15/2023    Patient: Marry Mathur  YOB: 1935    CARDIOLOGIST:  Haseeb Serrano MD      REFERRING DOCTOR:  Tatianna Easley MD        The above patient is being worked up for consideration of transcatheter aortic valve replacement (TAVR).    Patient will need clearance prior to proceeding due to malignancy.      Electronically Signed       ______  MD Signature   Haseeb Serrano MD       The above patient may proceed with aortic valve replacement from an oncology standpoint:  YES    NO     (Please [place] one)      If patient is unable to proceed please comment:     ______________________________________________________________________________________________________________________________________                              _____  MD Signature   Tatianna Easley MD

## 2023-06-15 NOTE — TELEPHONE ENCOUNTER
----- Message from Elizabeth Nam R.N. sent at 6/14/2023  4:49 PM PDT -----  Regarding: RE: Stopping Xarelto for angio  Yes, patient can hold Xarelto 2 days prior. Thanks!  ----- Message -----  From: Patricia Cano R.N.  Sent: 6/14/2023   4:00 PM PDT  To: Elizabeth Nam R.N.  Subject: FW: Stopping Xarelto for angio                     ----- Message -----  From: Felicitas Prado  Sent: 6/14/2023   3:57 PM PDT  To: Patricia Cano R.N.; Haseeb Serrano M.D.  Subject: Stopping Xarelto for angio                       Is patient ok to stop her xarelto for 2 days prior to Pre TAVR angio?

## 2023-06-15 NOTE — TELEPHONE ENCOUNTER
Haseeb Serrano M.D.  Elizabeth Nam R.N.  Can you also inquire with her oncologist regarding the status of melanoma and the suitability of proceeding with TAVR.     _______________________________________________________________________________    Called and spoke to patient. Confirmed patient is seen at [facility] by Dr. Easley. Clearance letter generated and faxed to oncology office at [phone]. Receipt confirmed.     Reviewed updated instructions to continue taking Xarelto prior to angiogram. Patient verbalizes understanding.

## 2023-06-16 ENCOUNTER — HOSPITAL ENCOUNTER (OUTPATIENT)
Dept: LAB | Facility: MEDICAL CENTER | Age: 88
End: 2023-06-16
Attending: INTERNAL MEDICINE
Payer: MEDICARE

## 2023-06-16 LAB
ALBUMIN SERPL BCP-MCNC: 4.1 G/DL (ref 3.2–4.9)
ALBUMIN/GLOB SERPL: 1.5 G/DL
ALP SERPL-CCNC: 172 U/L (ref 30–99)
ALT SERPL-CCNC: 33 U/L (ref 2–50)
ANION GAP SERPL CALC-SCNC: 11 MMOL/L (ref 7–16)
AST SERPL-CCNC: 27 U/L (ref 12–45)
BILIRUB SERPL-MCNC: 0.5 MG/DL (ref 0.1–1.5)
BUN SERPL-MCNC: 27 MG/DL (ref 8–22)
CALCIUM ALBUM COR SERPL-MCNC: 8.9 MG/DL (ref 8.5–10.5)
CALCIUM SERPL-MCNC: 9 MG/DL (ref 8.5–10.5)
CHLORIDE SERPL-SCNC: 103 MMOL/L (ref 96–112)
CO2 SERPL-SCNC: 25 MMOL/L (ref 20–33)
CREAT SERPL-MCNC: 0.82 MG/DL (ref 0.5–1.4)
FASTING STATUS PATIENT QL REPORTED: NORMAL
GFR SERPLBLD CREATININE-BSD FMLA CKD-EPI: 69 ML/MIN/1.73 M 2
GLOBULIN SER CALC-MCNC: 2.8 G/DL (ref 1.9–3.5)
GLUCOSE SERPL-MCNC: 93 MG/DL (ref 65–99)
POTASSIUM SERPL-SCNC: 5.3 MMOL/L (ref 3.6–5.5)
PROT SERPL-MCNC: 6.9 G/DL (ref 6–8.2)
SODIUM SERPL-SCNC: 139 MMOL/L (ref 135–145)

## 2023-06-16 PROCEDURE — 36415 COLL VENOUS BLD VENIPUNCTURE: CPT

## 2023-06-16 PROCEDURE — 80053 COMPREHEN METABOLIC PANEL: CPT

## 2023-06-21 ENCOUNTER — HOSPITAL ENCOUNTER (OUTPATIENT)
Dept: RADIOLOGY | Facility: MEDICAL CENTER | Age: 88
End: 2023-06-21
Payer: MEDICARE

## 2023-06-21 DIAGNOSIS — Z01.810 PRE-PROCEDURAL CARDIOVASCULAR EXAMINATION: ICD-10-CM

## 2023-06-21 DIAGNOSIS — I35.0 SEVERE AORTIC STENOSIS: ICD-10-CM

## 2023-06-21 DIAGNOSIS — I35.0 NONRHEUMATIC AORTIC (VALVE) STENOSIS: ICD-10-CM

## 2023-06-21 PROCEDURE — 71275 CT ANGIOGRAPHY CHEST: CPT

## 2023-06-21 PROCEDURE — 700117 HCHG RX CONTRAST REV CODE 255

## 2023-06-21 RX ADMIN — IOHEXOL 100 ML: 350 INJECTION, SOLUTION INTRAVENOUS at 10:07

## 2023-06-21 NOTE — TELEPHONE ENCOUNTER
Received oncology last office visit note; scanned into chart.    Clearance request re-faxed to [facility] at [phone]. Receipt confirmed.

## 2023-06-22 PROBLEM — D36.9 DERMOID CYST: Status: ACTIVE | Noted: 2023-06-22

## 2023-06-26 ENCOUNTER — PRE-ADMISSION TESTING (OUTPATIENT)
Dept: ADMISSIONS | Facility: MEDICAL CENTER | Age: 88
End: 2023-06-26
Attending: INTERNAL MEDICINE
Payer: MEDICARE

## 2023-06-26 DIAGNOSIS — Z01.810 PRE-OPERATIVE CARDIOVASCULAR EXAMINATION: ICD-10-CM

## 2023-06-26 DIAGNOSIS — Z01.812 PRE-OPERATIVE LABORATORY EXAMINATION: ICD-10-CM

## 2023-06-27 ENCOUNTER — PRE-ADMISSION TESTING (OUTPATIENT)
Dept: ADMISSIONS | Facility: MEDICAL CENTER | Age: 88
End: 2023-06-27
Attending: INTERNAL MEDICINE
Payer: MEDICARE

## 2023-06-27 DIAGNOSIS — Z01.810 PRE-OPERATIVE CARDIOVASCULAR EXAMINATION: ICD-10-CM

## 2023-06-27 DIAGNOSIS — Z01.812 PRE-OPERATIVE LABORATORY EXAMINATION: ICD-10-CM

## 2023-06-27 LAB
ALBUMIN SERPL BCP-MCNC: 4.1 G/DL (ref 3.2–4.9)
ALBUMIN/GLOB SERPL: 1.4 G/DL
ALP SERPL-CCNC: 116 U/L (ref 30–99)
ALT SERPL-CCNC: 27 U/L (ref 2–50)
ANION GAP SERPL CALC-SCNC: 11 MMOL/L (ref 7–16)
APTT PPP: 34.7 SEC (ref 24.7–36)
AST SERPL-CCNC: 21 U/L (ref 12–45)
BILIRUB SERPL-MCNC: 0.5 MG/DL (ref 0.1–1.5)
BUN SERPL-MCNC: 32 MG/DL (ref 8–22)
CALCIUM ALBUM COR SERPL-MCNC: 9.2 MG/DL (ref 8.5–10.5)
CALCIUM SERPL-MCNC: 9.3 MG/DL (ref 8.5–10.5)
CHLORIDE SERPL-SCNC: 101 MMOL/L (ref 96–112)
CO2 SERPL-SCNC: 28 MMOL/L (ref 20–33)
CREAT SERPL-MCNC: 0.89 MG/DL (ref 0.5–1.4)
EKG IMPRESSION: NORMAL
ERYTHROCYTE [DISTWIDTH] IN BLOOD BY AUTOMATED COUNT: 52.5 FL (ref 35.9–50)
GFR SERPLBLD CREATININE-BSD FMLA CKD-EPI: 63 ML/MIN/1.73 M 2
GLOBULIN SER CALC-MCNC: 3 G/DL (ref 1.9–3.5)
GLUCOSE SERPL-MCNC: 92 MG/DL (ref 65–99)
HCT VFR BLD AUTO: 38.1 % (ref 37–47)
HGB BLD-MCNC: 12.5 G/DL (ref 12–16)
INR PPP: 1.64 (ref 0.87–1.13)
MCH RBC QN AUTO: 30.5 PG (ref 27–33)
MCHC RBC AUTO-ENTMCNC: 32.8 G/DL (ref 32.2–35.5)
MCV RBC AUTO: 92.9 FL (ref 81.4–97.8)
PLATELET # BLD AUTO: 167 K/UL (ref 164–446)
PMV BLD AUTO: 12.1 FL (ref 9–12.9)
POTASSIUM SERPL-SCNC: 4.6 MMOL/L (ref 3.6–5.5)
PROT SERPL-MCNC: 7.1 G/DL (ref 6–8.2)
PROTHROMBIN TIME: 19 SEC (ref 12–14.6)
RBC # BLD AUTO: 4.1 M/UL (ref 4.2–5.4)
SODIUM SERPL-SCNC: 140 MMOL/L (ref 135–145)
WBC # BLD AUTO: 6.7 K/UL (ref 4.8–10.8)

## 2023-06-27 PROCEDURE — 85027 COMPLETE CBC AUTOMATED: CPT

## 2023-06-27 PROCEDURE — 80053 COMPREHEN METABOLIC PANEL: CPT

## 2023-06-27 PROCEDURE — 93005 ELECTROCARDIOGRAM TRACING: CPT

## 2023-06-27 PROCEDURE — 85730 THROMBOPLASTIN TIME PARTIAL: CPT

## 2023-06-27 PROCEDURE — 85610 PROTHROMBIN TIME: CPT

## 2023-06-27 PROCEDURE — 93010 ELECTROCARDIOGRAM REPORT: CPT | Performed by: STUDENT IN AN ORGANIZED HEALTH CARE EDUCATION/TRAINING PROGRAM

## 2023-06-27 PROCEDURE — 36415 COLL VENOUS BLD VENIPUNCTURE: CPT

## 2023-06-29 ENCOUNTER — HOSPITAL ENCOUNTER (OUTPATIENT)
Facility: MEDICAL CENTER | Age: 88
End: 2023-06-29
Attending: INTERNAL MEDICINE | Admitting: INTERNAL MEDICINE
Payer: MEDICARE

## 2023-06-29 ENCOUNTER — APPOINTMENT (OUTPATIENT)
Dept: CARDIOLOGY | Facility: MEDICAL CENTER | Age: 88
End: 2023-06-29
Attending: INTERNAL MEDICINE
Payer: MEDICARE

## 2023-06-29 VITALS
RESPIRATION RATE: 18 BRPM | SYSTOLIC BLOOD PRESSURE: 138 MMHG | DIASTOLIC BLOOD PRESSURE: 65 MMHG | WEIGHT: 156.97 LBS | HEART RATE: 61 BPM | BODY MASS INDEX: 27.81 KG/M2 | OXYGEN SATURATION: 97 % | TEMPERATURE: 96.8 F

## 2023-06-29 DIAGNOSIS — I35.0 SEVERE AORTIC STENOSIS: ICD-10-CM

## 2023-06-29 DIAGNOSIS — Z01.810 PRE-PROCEDURAL CARDIOVASCULAR EXAMINATION: ICD-10-CM

## 2023-06-29 DIAGNOSIS — I10 PRIMARY HYPERTENSION: ICD-10-CM

## 2023-06-29 DIAGNOSIS — I35.0 NONRHEUMATIC AORTIC (VALVE) STENOSIS: ICD-10-CM

## 2023-06-29 DIAGNOSIS — K21.9 GASTROESOPHAGEAL REFLUX DISEASE WITHOUT ESOPHAGITIS: ICD-10-CM

## 2023-06-29 PROCEDURE — 99153 MOD SED SAME PHYS/QHP EA: CPT

## 2023-06-29 PROCEDURE — 160002 HCHG RECOVERY MINUTES (STAT)

## 2023-06-29 PROCEDURE — 93567 NJX CAR CTH SPRVLV AORTGRPHY: CPT | Performed by: INTERNAL MEDICINE

## 2023-06-29 PROCEDURE — 160036 HCHG PACU - EA ADDL 30 MINS PHASE I

## 2023-06-29 PROCEDURE — 160046 HCHG PACU - 1ST 60 MINS PHASE II

## 2023-06-29 PROCEDURE — 93460 R&L HRT ART/VENTRICLE ANGIO: CPT | Mod: 26 | Performed by: INTERNAL MEDICINE

## 2023-06-29 PROCEDURE — G0278 ILIAC ART ANGIO,CARDIAC CATH: HCPCS | Performed by: INTERNAL MEDICINE

## 2023-06-29 PROCEDURE — 700117 HCHG RX CONTRAST REV CODE 255: Performed by: INTERNAL MEDICINE

## 2023-06-29 PROCEDURE — 99152 MOD SED SAME PHYS/QHP 5/>YRS: CPT | Performed by: INTERNAL MEDICINE

## 2023-06-29 PROCEDURE — 700111 HCHG RX REV CODE 636 W/ 250 OVERRIDE (IP): Mod: JZ

## 2023-06-29 PROCEDURE — 160035 HCHG PACU - 1ST 60 MINS PHASE I

## 2023-06-29 PROCEDURE — 700102 HCHG RX REV CODE 250 W/ 637 OVERRIDE(OP)

## 2023-06-29 PROCEDURE — A9270 NON-COVERED ITEM OR SERVICE: HCPCS

## 2023-06-29 PROCEDURE — 700101 HCHG RX REV CODE 250

## 2023-06-29 PROCEDURE — 82803 BLOOD GASES ANY COMBINATION: CPT

## 2023-06-29 RX ORDER — HEPARIN SODIUM 1000 [USP'U]/ML
INJECTION, SOLUTION INTRAVENOUS; SUBCUTANEOUS
Status: COMPLETED
Start: 2023-06-29 | End: 2023-06-29

## 2023-06-29 RX ORDER — SODIUM CHLORIDE 9 MG/ML
1000 INJECTION, SOLUTION INTRAVENOUS CONTINUOUS
Status: DISCONTINUED | OUTPATIENT
Start: 2023-06-29 | End: 2023-06-29

## 2023-06-29 RX ORDER — HEPARIN SODIUM 200 [USP'U]/100ML
INJECTION, SOLUTION INTRAVENOUS
Status: COMPLETED
Start: 2023-06-29 | End: 2023-06-29

## 2023-06-29 RX ORDER — LOSARTAN POTASSIUM 50 MG/1
50 TABLET ORAL
Qty: 90 TABLET | Refills: 3
Start: 2023-06-29 | End: 2024-02-07

## 2023-06-29 RX ORDER — MIDAZOLAM HYDROCHLORIDE 1 MG/ML
INJECTION INTRAMUSCULAR; INTRAVENOUS
Status: COMPLETED
Start: 2023-06-29 | End: 2023-06-29

## 2023-06-29 RX ORDER — SODIUM CHLORIDE 9 MG/ML
INJECTION, SOLUTION INTRAVENOUS CONTINUOUS
Status: DISCONTINUED | OUTPATIENT
Start: 2023-06-29 | End: 2023-06-29 | Stop reason: HOSPADM

## 2023-06-29 RX ORDER — VERAPAMIL HYDROCHLORIDE 2.5 MG/ML
INJECTION, SOLUTION INTRAVENOUS
Status: COMPLETED
Start: 2023-06-29 | End: 2023-06-29

## 2023-06-29 RX ORDER — OMEPRAZOLE 20 MG/1
20 CAPSULE, DELAYED RELEASE ORAL EVERY MORNING
Qty: 90 CAPSULE | Refills: 3 | Status: ON HOLD
Start: 2023-06-29

## 2023-06-29 RX ORDER — ASPIRIN 81 MG/1
TABLET, CHEWABLE ORAL
Status: COMPLETED
Start: 2023-06-29 | End: 2023-06-29

## 2023-06-29 RX ORDER — LIDOCAINE HYDROCHLORIDE 20 MG/ML
INJECTION, SOLUTION INFILTRATION; PERINEURAL
Status: COMPLETED
Start: 2023-06-29 | End: 2023-06-29

## 2023-06-29 RX ADMIN — HEPARIN SODIUM 2000 UNITS: 200 INJECTION, SOLUTION INTRAVENOUS at 07:56

## 2023-06-29 RX ADMIN — IOHEXOL 50 ML: 350 INJECTION, SOLUTION INTRAVENOUS at 08:45

## 2023-06-29 RX ADMIN — LIDOCAINE HYDROCHLORIDE: 20 INJECTION, SOLUTION INFILTRATION; PERINEURAL at 07:54

## 2023-06-29 RX ADMIN — FENTANYL CITRATE 100 MCG: 50 INJECTION, SOLUTION INTRAMUSCULAR; INTRAVENOUS at 08:27

## 2023-06-29 RX ADMIN — HEPARIN SODIUM 2000 UNITS: 200 INJECTION, SOLUTION INTRAVENOUS at 07:55

## 2023-06-29 RX ADMIN — VERAPAMIL HYDROCHLORIDE 5 MG: 2.5 INJECTION, SOLUTION INTRAVENOUS at 07:55

## 2023-06-29 RX ADMIN — HEPARIN SODIUM: 1000 INJECTION, SOLUTION INTRAVENOUS; SUBCUTANEOUS at 07:54

## 2023-06-29 RX ADMIN — NITROGLYCERIN 10 ML: 20 INJECTION INTRAVENOUS at 07:55

## 2023-06-29 RX ADMIN — ASPIRIN 81 MG 324 MG: 81 TABLET ORAL at 07:55

## 2023-06-29 RX ADMIN — MIDAZOLAM 2 MG: 1 INJECTION, SOLUTION INTRAMUSCULAR; INTRAVENOUS at 08:27

## 2023-06-29 ASSESSMENT — FIBROSIS 4 INDEX: FIB4 SCORE: 2.08

## 2023-06-29 NOTE — OR NURSING
Received pt A x 4  VSS in RA  CDI x 2  Rt wrist and rt AC  , some bruising, pt on xarelto , Pulses +2, warm, sensation and movt intact  Pain 0, nil n/v  Ambulatory and voided  DC teaching given to pt and friend  Happy to go home

## 2023-06-29 NOTE — PROCEDURES
Cardiac Catheterization Laboratory Procedure Note    DATE: 6/29/2023    : Darron Van MD    PROCEDURES PERFORMED:  Right heart catheterization  Left heart catheterization  with aortic valve study  Coronary angiography  Ascending aortography  Distal aortography with iliofemoral run-off  Moderate conscious sedation    INDICATIONS:  The patient is an 87-year-old woman referred for cardiac catheterization for aortic valve assessment.    CONSENT:  The complete alternatives, risks, and benefits of the procedure were explained to the patient.  Signed informed consent was obtained and placed in the chart prior to the procedure.  A timeout was performed prior to beginning the procedure.    MEDICATIONS:  Lidocaine  Fentanyl  Midazolam  Nitroglycerin  Verapamil  Heparin    MODERATE CONSCIOUS SEDATION:  I personally supervised the administration of moderate conscious sedation by the nursing staff for 42 minutes.  Sedation start time: 8:02 AM  Sedation end time: 8:44 AM    CONTRAST: Omnipaque 50 cc    ACCESS:  6-Malaysian Glidesheath in the right radial artery.  6-Malaysian Glidesheath in the right brachial vein    ESTIMATED BLOOD LOSS: 20 cc    COMPLICATIONS: None    DESCRIPTION OF PROCEDURE:  The patient was brought to the cardiac catheterization laboratory in the fasting state.  The skin over the right antecubital fossa and right wrist was prepped and draped in the usual sterile fashion.  Using previously established intravenous access in the right brachial vein, a 6-Malaysian Glidesheath was inserted.  A 6-Malaysian balloon--tipped pulmonary artery catheter was then advanced into the right atrium over an Ironman wire.  The balloon was then inflated and the catheter was advanced into the right ventricle, pulmonary artery, and wedge position with sequential pressures measured.  The balloon was then deflated, cardiac output was calculated using the thermodilution and Paula methods, and the catheter was removed.    Following  completion of right heart catheterization, lidocaine infiltration was used to anesthetize the tissue over the right radial artery.  Using the micropuncture technique, a 6-Greenlandic Glidesheath was inserted in the right radial artery.  A 5-Greenlandic Benson catheter was then advanced over a Versacore wire into the aortic root.  The catheter was then advanced over a standard J-wire into the left ventricular cavity where it was exchanged for a 6-Green[place]ic [place] catheter.  Both ports on the length and catheter were flushed and zeroed and then simultaneous aortic and left ventricular pressures were obtained.  The catheter was then withdrawn across the aortic valve with equalization of pressures in the aorta.    Upon completion of aortic valve study, the lengthening catheter was exchanged over a J-wire for the Benson catheter.  This catheter was then used to engage the ostium of the left main coronary artery and cineangiograms were obtained in multiple projections for complete evaluation of the left coronary system.  This catheter was then used to engage the ostium of the right coronary artery and cineangiograms were obtained in multiple projections for complete evaluation of the right coronary system.  Following completion of coronary angiography, the Benson catheter was exchanged for a 6-Greenlandic pigtail catheter, which was used to perform an ascending aortogram for procedural planning purposes.  This catheter was then used to perform a distal aortogram with iliofemoral run-off for procedural planning purposes.  At the completion of the case all wires, catheters, and sheaths were removed.  A TR band was placed using the patent hemostasis technique.    HEMODYNAMICS:   Mean right atrial pressure: 3 mmHg  Right ventricular pressure: 31/4 mmHg  Pulmonary artery pressure: 39/13/22 mmHg  Mean pulmonary capillary wedge pressure: 12 mmHg  Thermodilution cardiac output: 4.8 L/min  Thermodilution cardiac index: 2.8 L/min/m²  Paula cardiac  index: 3.7 L/min  Paula cardiac index: 2.1 L/min/m²  Aortic pressure: 118/58 mmHg  Left ventricular pressure: 171/5 mmHg  Mean transaortic gradient: 44 mmHg  Calculated aortic valve area: 0.72 cm²    CORONARY ANGIOGRAPHY:  The left main coronary artery is patent and trifurcates into the left anterior descending, ramus intermedius, and left circumflex coronary arteries.  The left anterior descending coronary artery is a moderate, transapical vessel with focal calcific mid 30% disease after the takeoff of the first diagonal branch and otherwise minimal luminal irregularities in the distribution.  The ramus intermedius is a small-caliber vessel with no angiographically significant disease.  The left circumflex coronary artery is a small, nondominant vessel that supplies several small obtuse marginal branches and has no angiographically significant disease.  The right coronary artery is a large, dominant vessel that supplies a large posterior descending coronary and a large posterolateral branch and has minimal luminal irregularities in the distribution.    ASCENDING AORTOGRAPHY:  Normal ascending aorta diameter at 2.6 mm  No significant aortic regurgitation..    DISTAL AORTOGRAPHY WITH ILIOFEMORAL RUN-OFF:  Tortuous bilateral iliofemoral arteries with luminal irregularities.  Right common femoral artery diameter 6.8 mm  Left common femoral artery diameter 6.5 mm    IMPRESSION:  Severe aortic stenosis by invasive assessment.  Normal left heart filling pressures.  Normal right heart filling pressures.  Normal pulmonary artery pressures for age.  Minimal nonobstructive coronary artery disease.  Minimal nonobstructive iliofemoral arterial disease.    RECOMMENDATIONS:  Recover in post procedure area.  TR band release per protocol.  Continue evaluation for aortic valve replacement without need for concomitant revascularization.    NOTIFICATION:  The patient's family was notified of the results of her cardiac  catheterization.

## 2023-06-29 NOTE — OR NURSING
Pt arrives from Cath Lab to PACU at 0907. Pt identification verified by team, pt placed on all monitors with alarms audible, report and care of pt received from RN. Assessment completed, pt noted to have area of swelling proximal to the TR band on the right wrist, pressure held by Cath Lab MARCO Marks, additional cc of air added to TR band for a total of 14cc. Call placed to Cath Lab charge MARCO Orellana, asked Tech Ez to bring additional TR band to bedside. Ez arrives to bedside, 2nd TR band placed proximal to initial TR band with 8cc air. Explained to pt nurses concerns both during and after procedure. Pt changed into hospital gown and provided with warm blankets. Belongings brought to bedside, pt given her Katina to help pass time.      0945- Call placed to pt friend (Lee), update provided.

## 2023-06-29 NOTE — DISCHARGE INSTRUCTIONS
HOME CARE INSTRUCTIONS    ACTIVITY: Rest and take it easy for the first 24 hours.  A responsible adult is recommended to remain with you during that time.  It is normal to feel sleepy.  We encourage you to not do anything that requires balance, judgment or coordination.    FOR 24 HOURS DO NOT:  Drive, operate machinery or run household appliances.  Drink beer or alcoholic beverages.  Make important decisions or sign legal documents.    SPECIAL INSTRUCTIONS: Limit rt arm movement for 48 hrs.    POST ANGIOGRAM  General Care Instructions  Maintain a bandage over the incision site for 24 hours.  It's normal to find a small bruise or dime-sized lump at the insertion site. This should disappear within a few weeks.  Do not apply lotions or powders to the site.  Do not immerse the catheter insertion site in water (bathtub/swimming) for five days. It is ok to shower 24 hours after the procedure.  You may resume your normal diet immediately; on the day of your procedure, drink 6-10 glasses of water to help flush the contrast liquid out of your system.  If the doctor inserted the catheter in through your groin:  Walking short distances on a flat surface is OK. Limit going up/down stairs for the first 2 days.  DO NOT do yard work, drive, squat, lift heavy objects, or play sports for 2 days; or until your health care provider tells you it is OK.  If the doctor inserted the catheter in your arm:  For 24 hours, DO NOT lift anything heavier than 10 pounds (approximately a gallon of milk). DO NOT do any heavy pushing, pulling, or twisting.    Medications  If your current medications need to be changed, you will be provided with an updated list of your medications prior to discharge.  If you take warfarin (Coumadin), resume taking your usual dose the evening after the procedure.  DO NOT STOP taking prescribed blood thinning (anti-platelet) medications unless instructed by your cardiologist.  These medications include:  Aspirin,  Clopidogrel (Plavix), Ticagrelor (Brilinta), or Prasugrel (Effient)   If you take one of the following anticoagulants, RESUME 24 HOURS after your procedure:  Apixiban (Eliquis), Rivaroxaban (Xarelto), Dabigatran (Pradaxa), Edoxaban (Savaysa)  If you take metformin (Glucophage), RESUME 48 HOURS after your procedure.    When to call your healthcare provider  Call your cardiologist right away at [phone] if you have any of the following:   Problems/Concerns taking any of your prescribed heart medicines.   The insertion site has increasing pain, swelling, redness, bleeding, or drainage.   Your arm or leg below where the insertion site changes color, is cool, or is numb.   You have chest pain or shortness of breath that does not go away with rest.   Your pulse feels irregular -- very slow (less than 60 beats/minute) or very fast (over 100 beats/minute).   You have dizziness, fainting, or you are very tired.   You are coughing up blood or yellow or green mucus.   You have chills or a fever over 101°F (38.3°C).    If there is bleeding at the catheter insertion site, apply pressure for 10 minutes.  If bleeding persists, call 911, and continue to hold pressure until advanced medical support arrives.      DIET: To avoid nausea, slowly advance diet as tolerated, avoiding spicy or greasy foods for the first day.  Add more substantial food to your diet according to your physician's instructions.  Babies can be fed formula or breast milk as soon as they are hungry.  INCREASE FLUIDS AND FIBER TO AVOID CONSTIPATION.    SURGICAL DRESSING/BATHING: maintain dressing for 24 hours. Okay to remove and shower after that. Do not submerge in water for 1 week (hot tubs, baths, pools).    MEDICATIONS: Resume taking daily medication.  Take prescribed pain medication with food.  If no medication is prescribed, you may take non-aspirin pain medication if needed.  PAIN MEDICATION CAN BE VERY CONSTIPATING.  Take a stool softener or laxative  such as senokot, pericolace, or milk of magnesia if needed.    A follow-up appointment should be arranged with your doctor in 1-2 weeks; call to schedule.    You should CALL YOUR PHYSICIAN if you develop:  Fever greater than 101 degrees F.  Pain not relieved by medication, or persistent nausea or vomiting.  Excessive bleeding (blood soaking through dressing) or unexpected drainage from the wound.  Extreme redness or swelling around the incision site, drainage of pus or foul smelling drainage.  Inability to urinate or empty your bladder within 8 hours.  Problems with breathing or chest pain.    You should call 911 if you develop problems with breathing or chest pain.  If you are unable to contact your doctor or surgical center, you should go to the nearest emergency room or urgent care center.  Physician's telephone #: Dr. Van [phone].    MILD FLU-LIKE SYMPTOMS ARE NORMAL.  YOU MAY EXPERIENCE GENERALIZED MUSCLE ACHES, THROAT IRRITATION, HEADACHE AND/OR SOME NAUSEA.    If any questions arise, call your doctor.  If your doctor is not available, please feel free to call the Surgical Center at [phone].  The Center is open Monday through Friday from 7AM to 7PM.      A registered nurse may call you a few days after your surgery to see how you are doing after your procedure.    You may also receive a survey in the mail within the next two weeks and we ask that you take a few moments to complete the survey and return it to us.  Our goal is to provide you with very good care and we value your comments.     Depression / Suicide Risk    As you are discharged from this [facility] facility, it is important to learn how to keep safe from harming yourself.    Recognize the warning signs:  Abrupt changes in personality, positive or negative- including increase in energy   Giving away possessions  Change in eating patterns- significant weight changes-  positive or negative  Change in sleeping patterns- unable to  sleep or sleeping all the time   Unwillingness or inability to communicate  Depression  Unusual sadness, discouragement and loneliness  Talk of wanting to die  Neglect of personal appearance   Rebelliousness- reckless behavior  Withdrawal from people/activities they love  Confusion- inability to concentrate     If you or a loved one observes any of these behaviors or has concerns about self-harm, here's what you can do:  Talk about it- your feelings and reasons for harming yourself  Remove any means that you might use to hurt yourself (examples: pills, rope, extension cords, firearm)  Get professional help from the community (Mental Health, Substance Abuse, psychological counseling)  Do not be alone:Call your Safe Contact- someone whom you trust who will be there for you.  Call your local CRISIS HOTLINE 039-5101 or 132-461-0243  Call your local Children's Mobile Crisis Response Team [place] (066) 807-6652 or www.[facility]  Call the toll free National Suicide Prevention Hotlines   National Suicide Prevention Lifeline 147-254-ZLYF (6144)  National Hope Line Network 800-SUICIDE (372-6058)    I acknowledge receipt and understanding of these Home Care instructions.

## 2023-06-30 ENCOUNTER — OFFICE VISIT (OUTPATIENT)
Dept: VASCULAR LAB | Facility: MEDICAL CENTER | Age: 88
End: 2023-06-30
Payer: MEDICARE

## 2023-06-30 VITALS
HEART RATE: 70 BPM | WEIGHT: 151 LBS | BODY MASS INDEX: 26.75 KG/M2 | DIASTOLIC BLOOD PRESSURE: 63 MMHG | HEIGHT: 63 IN | SYSTOLIC BLOOD PRESSURE: 117 MMHG

## 2023-06-30 DIAGNOSIS — Z79.01 LONG TERM (CURRENT) USE OF ANTICOAGULANTS: ICD-10-CM

## 2023-06-30 DIAGNOSIS — I82.5Y2 CHRONIC DEEP VEIN THROMBOSIS (DVT) OF PROXIMAL VEIN OF LEFT LOWER EXTREMITY (HCC): ICD-10-CM

## 2023-06-30 DIAGNOSIS — Z79.01 CHRONIC ANTICOAGULATION: ICD-10-CM

## 2023-06-30 DIAGNOSIS — E78.49 OTHER HYPERLIPIDEMIA: ICD-10-CM

## 2023-06-30 DIAGNOSIS — I10 PRIMARY HYPERTENSION: ICD-10-CM

## 2023-06-30 PROCEDURE — 3074F SYST BP LT 130 MM HG: CPT

## 2023-06-30 PROCEDURE — 99212 OFFICE O/P EST SF 10 MIN: CPT

## 2023-06-30 PROCEDURE — 3078F DIAST BP <80 MM HG: CPT

## 2023-06-30 PROCEDURE — 99214 OFFICE O/P EST MOD 30 MIN: CPT

## 2023-06-30 ASSESSMENT — ENCOUNTER SYMPTOMS
HEADACHES: 1
HEMOPTYSIS: 0
PND: 0
ORTHOPNEA: 0
SPUTUM PRODUCTION: 0
PALPITATIONS: 0
FOCAL WEAKNESS: 0
FEVER: 0
WHEEZING: 0
CHILLS: 0
SHORTNESS OF BREATH: 0
CLAUDICATION: 0
COUGH: 0
BLOOD IN STOOL: 0
SPEECH CHANGE: 0
WEAKNESS: 0
DIZZINESS: 0
BLURRED VISION: 0
DOUBLE VISION: 0

## 2023-06-30 ASSESSMENT — FIBROSIS 4 INDEX: FIB4 SCORE: 2.08

## 2023-06-30 NOTE — PROGRESS NOTES
"FOLLOW-UP VASCULAR ANTICOAGULATION VISIT  06/30/2023  Marry Mathur is a 86 yo female who presents for f/u   Initially referred by Yvan Shearer PRosaliaARosalia-C. for length of therapy (LOT) determination and management of anticoagulation in context of acute venothromboembolic disease    Subjective      Interval hx/concerns:    feeling well,   Had pre-procedure for TAVR yesterday, has bruising on arm from \"repeat pokes\"  Does not have TAVR scheduled, nor plan at this time.  Still needs to follow up with Dr. Serrano  Did ABPM in may, reports had issue with it just continually squeezing her arm (without releasing) which caused increased pain and leg swelling.  Went to ER to be check out, where the did some testing and no new DVT.  ER RN removed device and replaced it, it then worked and took pressures appropriately.     Had IVC filter removed 5/26, went well       HTN:  Home BP log: variable,  110-163s/60-90s.  Avg 120-130s/60-70s   Adherence to current HTN meds: compliant all of the time   Tolerating losartan  Denies dizziness/lightheadedness  Occasional HAs, but relates could be due to being hungry or needing to eat, which helps     IVC filter:   Date of placement: 9/26/22, failed retrieval 1/9/23, successful removal 5/26/2023  Reason for placement: back surgery , surg-assoc LLE DVT, unable to anticoag  Pending surgeries: TAVR (not scheduled)    had failed IVC filter retrieval 1/9/23 possibly related to thrombus at filter site per report    VTE disease / Anticoagulation: (reviewed from prior)  Date of initiation of anticoagulation: >20yrs   Current symptoms:  Denies current SOB, CP, leg swelling, edema, leg pain, cyanosis of digits, redness of extremities.  Current antithrombotic agent: xarelto 20mg daily   Complications: R retroperitoneal hemorrhage   Adherence: reports complete, no missed doses    _____Pertinent VTE pmhx:  Date of Diagnosis: 1970s   Type of Venous thromboembolic disease (VTE): multiple "   Preceding/presenting symptoms: calf pain   Antithrombotic therapy at time of VTE event: no  VTE tx course: VKA, xarelto 20mg   Any personal VTE hx? Yes, Details: multiple   Any family VTE hx? No  _____PROVOKING FACTORS:  Recent surgery ? Yes, Details: back surgery   Recent trauma ? No  Smoker?  reports that she has never smoked. She has never used smokeless tobacco.    Extended travel? No  Other periods of immobility? Yes, Details: related to back surg  Other known or potential risk factors for VTE disease:  no  WOMEN: Hx of cancer or abnormal cancer screenings: no       Any estrogen, testosterone HRT, E2 birth control, tamoxifene, raloxifene: no       Hx of recurrent miscarriages: no  Hypercoaguability work-up completed?  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 SPIT OUT ALL EXCESS. DO NOT RINSE, Taking    amLODIPine, 2.5 mg, Oral, QHS, Taking    atorvastatin, 20 mg, Oral, Q EVENING, Taking    gabapentin, 300 mg, Oral, DAILY, Taking    Multiple Vitamins-Minerals (PRESERVISION AREDS 2 PO), 1 Capsule, Oral, BID, Taking    Calcium Carbonate, 600 mg, Oral, QAM, Taking    rivaroxaban, 20 mg, Oral, PM MEAL, Taking    polyethylene glycol/lytes, 17 g, Oral, QDAY PRN, Taking    acetaminophen, 650 mg, Oral, TID PRN, PRN    melatonin, 10 mg, Oral, Nightly, Taking    vitamin D, 1,000 Units, Oral, QAM, Taking     Social History     Tobacco Use    Smoking status: Never    Smokeless tobacco: Never   Vaping Use    Vaping Use: Never used   Substance Use Topics    Alcohol  "use: Yes     Comment: very rare    Drug use: Never       DIET AND EXERCISE:  Weight Change:stable   BMI Readings from Last 5 Encounters:   06/30/23 26.75 kg/m²   06/29/23 27.81 kg/m²   06/13/23 27.81 kg/m²   05/26/23 27.30 kg/m²   05/24/23 28.16 kg/m²     Diet: common adult  Exercise: moderate regular exercise program     Review of Systems   Constitutional:  Negative for chills, fever and malaise/fatigue.   HENT:  Negative for nosebleeds.    Eyes:  Negative for blurred vision and double vision.   Respiratory:  Negative for cough, hemoptysis, sputum production, shortness of breath and wheezing.    Cardiovascular:  Negative for chest pain, palpitations, orthopnea, claudication, leg swelling and PND.   Gastrointestinal:  Negative for blood in stool and melena.   Genitourinary:  Negative for hematuria.   Neurological:  Positive for headaches. Negative for dizziness, speech change, focal weakness and weakness.           Objective    Vitals:    06/30/23 0924   BP: 117/63   BP Location: Right arm   Patient Position: Sitting   BP Cuff Size: Adult   Pulse: 70   Weight: 68.5 kg (151 lb)   Height: 1.6 m (5' 3\")      BP Readings from Last 20 Encounters:   06/30/23 117/63   06/29/23 138/65   06/13/23 136/84   05/26/23 (!) 157/71   05/24/23 (!) 140/74   04/28/23 (!) 146/75   03/09/23 130/72   02/17/23 118/68   01/13/23 (!) 155/82   01/09/23 (!) 176/79   12/29/22 (!) 150/90   12/29/22 130/72   12/15/22 108/65   10/06/22 (!) 145/65   09/28/22 119/75   09/24/22 (!) 168/78   09/08/22 (!) 164/120   08/25/22 124/74   08/22/22 (!) 138/92   08/12/22 (!) 140/84      Body mass index is 26.75 kg/m².  Physical Exam  Vitals reviewed.   Constitutional:       General: She is not in acute distress.     Appearance: Normal appearance.   HENT:      Head: Normocephalic and atraumatic.   Eyes:      General: No scleral icterus.     Conjunctiva/sclera: Conjunctivae normal.   Neck:      Thyroid: No thyroid mass.      Vascular: Normal carotid pulses.     "  Trachea: Trachea normal.   Cardiovascular:      Rate and Rhythm: Normal rate and regular rhythm.      Pulses: Normal pulses.           Posterior tibial pulses are 2+ on the right side and 2+ on the left side.      Heart sounds: Murmur heard.   Pulmonary:      Effort: Pulmonary effort is normal. No respiratory distress.      Breath sounds: Normal breath sounds. No wheezing or rales.   Musculoskeletal:      Cervical back: Full passive range of motion without pain.      Right lower leg: No edema.      Left lower leg: No edema.   Skin:     General: Skin is warm and dry.      Capillary Refill: Capillary refill takes less than 2 seconds.      Coloration: Skin is not cyanotic or pale.      Findings: Bruising (right forearm) present.      Nails: There is no clubbing.   Neurological:      General: No focal deficit present.      Mental Status: She is alert and oriented to person, place, and time. Mental status is at baseline.      Coordination: Coordination is intact. Coordination normal.      Gait: Gait is intact. Gait normal.   Psychiatric:         Mood and Affect: Mood normal.         Behavior: Behavior normal.         Thought Content: Thought content normal.         Lab Results   Component Value Date    CHOLSTRLTOT 176 03/24/2023     (H) 03/24/2023    HDL 56 03/24/2023    TRIGLYCERIDE 96 03/24/2023      No results found for: LIPOPROTA   No results found for: APOB    Lab Results   Component Value Date    PROTHROMBTM 19.0 (H) 06/27/2023    INR 1.64 (H) 06/27/2023       Lab Results   Component Value Date    HBA1C 5.6 09/16/2022      Lab Results   Component Value Date    SODIUM 140 06/27/2023    POTASSIUM 4.6 06/27/2023    CHLORIDE 101 06/27/2023    CO2 28 06/27/2023    GLUCOSE 92 06/27/2023    BUN 32 (H) 06/27/2023    CREATININE 0.89 06/27/2023    IFAFRICA >60 12/05/2021    IFNOTAFR >60 12/05/2021        Lab Results   Component Value Date    WBC 6.7 06/27/2023    RBC 4.10 (L) 06/27/2023    HEMOGLOBIN 12.5 06/27/2023     HEMATOCRIT 38.1 06/27/2023    MCV 92.9 06/27/2023    MCH 30.5 06/27/2023    MCHC 32.8 06/27/2023    MPV 12.1 06/27/2023      VASCULAR IMAGING:     Carotid 2018  1.  There mild to moderate atherosclerotic plaque.  Plaque is located in carotid bulbs and proximal internal carotid arteries.  Plaque characterization:  Primarily calcific   2.  There is no evidence of carotid occlusion.   3. Vertebral arteries demonstrate antegrade flow.   4. Diameter reduction in the internal carotid arteries: less than 50%. There is no hemodynamically significant stenosis.    LLE venous 10/2019   Chronic appearing non-occlusive thrombus within the mid femoral vein.    VANESSA 2020  There is no evidence of major arterial disease demonstrated    RLE venous 4/12/21  . No evidence of right lower extremity deep venous thrombosis.    LLE venous 9/25/22   1) Non-occlusive thrombus in the left mid SFV, likely chronic.  Otherwise    no acute DVT appreciated    IVC filter placement 9/26/22  1. Ultrasound and fluoroscopic guided placement of a Argon Option retrievable caval filter in the infrarenal IVC.  The Argon Option retrievable filter is suitable for removal under usual circumstances to 180 days (6 months) and sometimes up to one year and longer. This filter may also be used as a permanent filter. Please consult Interventional Radiology for filter   removal if indicated.   2. Inferior vena cavagram within normal limits with no evidence of caval thrombus or occlusion.    CTA abd/pelvis 10/3/22  Aorta and vasculature: No dissection. No abdominal aortic aneurysm. No evidence of hemodynamically significant stenosis in the major visceral branches, pelvic arteries or imaged femoral arteries.   1.  Large RIGHT retroperitoneal hematoma without evidence of active arterial extravasation  2.  No evidence of aortic dissection or hemodynamically significant stenosis  3.  LEFT lower quadrant chronic fat necrosis redemonstrated. This is not demonstrably  changed.  4.  Prior cholecystectomy with mild biliary dilatation which could be compensatory  5.  Trace RIGHT pleural effusion    IVC filter retrieval 1/9/23  1. Inferior venacavogram demonstrating small amount of tip thrombus   2. Unsuccessful attempted retrieval of infrarenal IVC filter, possibly due to small amount of clot at the filter tip entering into the wall and preventing engagement. This filter could be LEFT in place if clinically appropriate. Further interval of   anticoagulation therapy followed by more aggressive attempts at filter retrieval may be warranted if clinically appropriate.     CTV abd/pelvis 4/12/23  1.  IVC filter is again noted. Grossly there is no evidence of IVC thrombosis with evaluation degraded by streak artifact from lumbar spinal fusion hardware.  2.  No definite iliofemoral DVT.  3.  No acute abnormality.  4.  Additional findings as detailed.    ABPM 5/2023  Suboptimal data acquisition with only 30 successful readings  Mean available daytime: 130/75  Nocturnal dip: Appears well-maintained based on the readings we have available    IVC Filter Removal 5/26/2023  1. Inferior venacavogram demonstrating minimal thrombus at the filter retrieval hook.  2. Successful retrieval of infrarenal Argon Option Elite IVC filter.    Venous duplex 5/2023   1) Partial filling defect in in the left femoral vein from the proximal-mid    to distal thigh, appearance favors chronic recanalized DVT.   2) No right DVT appreciated    Echo 6/2023  Normal LV systolic function  Moderate concntric LVH  Moderate to severe Aortic stenosis  Mild mitral stenosis with heavy mitral annular calcification    CT TAVR chest/abd/pelvis 6/2023  1. Tricuspid aortic valve with moderate calcifications.  Aortic valve calcium score: 1731.5  Annulus area: 534 mm?  Annulus diameter: 29.3 x 21.0 mm  Diameter at the level of the sinuses: 34.2 x 29.9 x 29.4 mm  2. Access evaluation:  Mild scattered calcification and low density  plaque. Tortuous bilateral iliac arteries.  Measurement, minimal luminal diameter:  Right mid external iliac artery:   7.4 mm  Left mid external iliac artery: 7.6 mm             Medical Decision Making:  Today's Assessment / Status / Plan:     1. Other hyperlipidemia  Lipid Profile      2. 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 Chronic anticoagulation        4. Primary hypertension        5. Long term (current) use of anticoagulants            PATIENT TYPE: Primary Prevention    Etiology of Established CVD if Present:     1) recurrent VTE disease   9/2022 - most recent - chronic LLE fem V DVT as of - stable   Thrombophilia/hypercoag evaluation: chart reports protein S deficiency however pt reports no knowledge of this and there is no off-anticoag prot S levels available for my review on the chart, so this would draw a question as to validity of dx   Protein S repeated while on xarelto, did not come off for IVC filter removal  - no fhx of recurrent VTE   - no imaging surveillance needed at this time  - antthrombotic plan as noted below   - consider repeat prot S level in future if ever off xarelto for another procedure      2) Aortic stenosis - no symptoms, non progressive  - defer surveillance and mgmt to cardiology     IVC filter, placed 9/26/22/ REMOVED 5/26/2023    Future Surgeries: none   Bleeding complications while on anticoagulation: had 1 episode of retroperitoneal Hemorrhage  - failed retrieval 1/9/23 per IR   CTV 4/10/23 shows no indications of DVT on IVC filter   S/P successful removal of IVC filter 5/26/2023      ANTITHROMBOTIC THERAPY:  Date of initiation: >20yrs   HAS-BLED bleeding risk calc ([email or web address]): 2 pt, 4.1%, moderate risk   Factors to consider for indefinite OAC: recurrent, ? Prot S deficiency   Last CBC, BMP: reviewed above   Expected duration: reviewed standard of care as per Chest 2021 guidelines is 3-6 months minimum on full dose OAC.   After review of risks/benefits/alternatives, through shared decision-making, we will continue indefinitely xarelto 20mg   Antithrombotic therapy plan:  - continue xarelto 20mg daily indefinitely - annual risk/benefit review  - ongoing mgmt per [facility] clinic   -  CBC and ongoing Q6mo surveillance as per [facility] clinic standard  - counseled on signs and symptoms of acute VTE that require seeking prompt attention in the ED to include shortness of breath, chest pain, pain with deep inhalation, acute leg swelling and/or pain in calf or leg   - elevate legs as much as possible, use compression stockings/socks if directed by your provider  - Avoid hormonal therapies including estrogen or testosterone-containing meds, or raloxifene or tamoxifene (commonly used for osteoporosis)  - Avoid sedentary periods  - continue complete avoidance of tobacco products  - if having any invasive procedure,please make sure the doctor knows of your history of blood clots and current anticoagulation status  - avoid Aspirin and anti-inflammatories (eg. Advil, ibuprofen, Aleve, naproxen, etc) while anticoagulated   - avoid skiing or other dangerous activities to reduce risk of head injury and brain bleeds  - recommended to see your PCP to discuss if you need age-appropriate cancer screenings as a small % of blood clots may be caused by an underlying malignancy  - if any bleeding lasting 30min without stopping, please seek care with your PCP, urgent care, or ED  - reversal agents for most blood thinners are now available and used if you have major bleeding    LIPID MANAGEMENT:   Qualifies for Statin Therapy Based on 2018 ACC/AHA Guidelines: yes, no guidelines for >76yo primary prevention  The ASCVD Risk score (Stacie DK, et al., 2019) failed to calculate., N/A  Major ASCVD events: None  High-risk conditions: N/A  Risk-enhancers: N/A  Currently on Statin: Yes  Tx goals: LDL-C <100 (consider non-HDL-C <130, apoB <90), reasonable   At goal? No, LDL slightly  elevated at 101 3/2023,  Plan:   - reinforced ongoing TLC measures as noted   - monitor labs   Meds:   - continue atorva 20mg daily     BLOOD PRESSURE MANAGEMENT:  ACC/AHA (2017) goal <130/80  Home BP at goal: yes  Office BP at goal:  yes  ABPM completed 5/2023 with sub-optimal data requisition, pt reported issues with continued cuff inflation.  Average of readings obtained 130/75.  Could consider repeat in future, pt reluctant to repeat at this time.     Plan:   - continue healthy diet, activity, weight mgmt   Monitoring:   - routine clinic-based BP measurements at least once annually   -consider repeat ABPM in future  Medications:   -restart amlodipine 2.5mg QHS - best med for variability reduction, swelling should be countered by ARB, however gabapentin at high dose likely cause of swelling  - continue losartan 50mg daily, increase as next step  - add thiazide as 3rd agent     GLYCEMIC STATUS:  Normal    LIFESTYLE INTERVENTIONS:    SMOKING:    reports that she has never smoked. She has never used smokeless tobacco.   - continued complete avoidance of all tobacco products     PHYSICAL ACTIVITY: continue healthy activity to improve CV fitness.  In general, targeting >150min/week of moderate-level activity or as much as tolerated in light of functional status and co-morbidities     WEIGHT MANAGEMENT AND NUTRITION: Mediterranean style dietary approach       OTHER: none       Instructed to follow-up with PCP for remainder of adult medical needs: yes  We will partner with other providers in the management of established vascular disease and cardiometabolic risk factors.    Studies to Be Obtained: none  Labs to Be Obtained:  lipid, and labs as ordered by PCP/cards    Follow up in: o    CRISTINO Turner  [facility]   [facility]   [phone]

## 2023-07-03 ENCOUNTER — TELEPHONE (OUTPATIENT)
Dept: CARDIOLOGY | Facility: MEDICAL CENTER | Age: 88
End: 2023-07-03
Payer: MEDICARE

## 2023-07-05 ENCOUNTER — TELEPHONE (OUTPATIENT)
Dept: CARDIOLOGY | Facility: MEDICAL CENTER | Age: 88
End: 2023-07-05
Payer: MEDICARE

## 2023-07-05 NOTE — TELEPHONE ENCOUNTER
Spoke with Dr. Kaushal yadav at cancer [facility] regarding patient's melanoma diagnosis.  It is his opinion that the patient can proceed with TAVR.  He suggests that the procedure can either happen in between her immune therapy appointments or could wait until she is done with treatment.

## 2023-07-06 NOTE — TELEPHONE ENCOUNTER
Called and spoke to patient. Discussed rescheduling IC and CTS consultation appointments. Patient states she has immunotherapy appointment on 07/18/2023 and should be finding out at that time when therapies will be complete. She states she will be able to make decision at that time. Advised RN would reach out on 07/19/2023 to discuss. Patient is in agreement and verbalizes understanding.

## 2023-07-07 NOTE — PROGRESS NOTES
Chief Complaint   Patient presents with   • Follow-Up     Hospital       Subjective:   Marry Mathur is a 82 y.o. female here today for evaluation and management of:    Rheumatoid arthritis involving multiple sites with positive rheumatoid factor (CMS-HCC)  Dr. Escoto    Bronchitis  Recent cruise and then developed fever, chills, cough, sore throat and congestion. Seen in ER.  Chest xray negative.  Labs ok.  Negative Flu.  Z pack given.  Has not been taking any cough medication, cold medication or ibuprofen.  Has RA and took MTX and humira prior to becoming ill.   No fever, chills today.  Cough continues.  Difficulty sleeping.  No chest pain reported.         Patient is in between PCP.  Waiting for appointment at [place] with new provider.    Current medicines (including changes today)  Current Outpatient Prescriptions   Medication Sig Dispense Refill   • benzonatate (TESSALON) 100 MG Cap Take 1 Cap by mouth 2 times a day as needed for Cough. 30 Cap 0   • azithromycin (ZITHROMAX) 250 MG Tab Take 2 tabs on day one then 1 tab on day 2-5 6 Tab 0   • warfarin (COUMADIN) 2.5 MG Tab Take 1 to 2 tablets by mouth daily as directed by the coumadin clinic 180 Tab 1   • hydrocodone-acetaminophen (NORCO) 5-325 MG Tab per tablet 1 tab po bid for severe joint pain, NO ALCOHOL , NO DRIVING and NO MARIJUANA  within 24 hrs of taking this medication, NO BENZODIAZEPINE medications, no selling or giving to any other persons 30 Tab 0   • Psyllium (METAMUCIL FIBER PO) Take  by mouth.     • Wheat Dextrin (BENEFIBER DRINK MIX PO) Take  by mouth.     • methotrexate 2.5 MG Tab 6 tabs po q wednesday 72 Tab 0   • Adalimumab 40 MG/0.8ML Prefilled Syringe Kit Inject 0.8 mL as instructed every 14 days. 6 Each 1   • valsartan-hydrochlorothiazide (DIOVAN HCT) 80-12.5 MG per tablet Take 0.5 Tabs by mouth every day. 45 Tab 1   • atorvastatin (LIPITOR) 20 MG Tab Take 1 Tab by mouth every day. 90 Tab 1   • omeprazole (PRILOSEC) 20 MG  The patient is Stable - Low risk of patient condition declining or worsening    Shift Goals  Clinical Goals: getting IV abx  Patient Goals: pain control  Family Goals: not present    Progress made toward(s) clinical / shift goals:  pt received 2 doses of IV abx in 12 hour. Pt reported pain <4/10 with prn pain medicaiton    Patient is not progressing towards the following goals:       "delayed-release capsule Take 1 Cap by mouth every day. 90 Cap 3   • metformin (GLUCOPHAGE) 500 MG Tab Take 1 Tab by mouth every day. 90 Tab 3   • pregabalin (LYRICA) 75 MG Cap Take 1 Cap by mouth 2 times a day. 180 Cap 1   • acetaminophen (TYLENOL) 325 MG Tab Take 650 mg by mouth at bedtime as needed.     • folic acid (FOLVITE) 400 MCG tablet Take 400 mcg by mouth every day.     • vitamin D (CHOLECALCIFEROL) 1000 UNIT Tab Take 1,000 Units by mouth every day.     • POTASSIUM GLUCONATE Take 1 Tab by mouth every day.     • magnesium oxide (MAG-OX) 400 MG TABS Take 400 mg by mouth 2 times a day.     • Calcium Carbonate-Vitamin D (CALCIUM + D PO) Take 1 Tab by mouth every day.     • tizanidine (ZANAFLEX) 2 MG capsule 1-2 tabs po qhs for muscle relaxer 60 Cap 2   • lidocaine (LIDODERM) 5 % Patch Apply 1 Patch to skin as directed every 24 hours. 90 Patch 1     No current facility-administered medications for this visit.      She  has a past medical history of Blood clotting disorder (CMS-HCC) (2012); Cancer (CMS-HCC); Chronic back pain greater than 3 months duration; Hiatus hernia syndrome; Hyperlipidemia; Hypertension; Prediabetes; and Rheumatoid arthritis with rheumatoid factor (CMS-HCC).    ROS as stated in hpi.    No chest pain, no shortness of breath, no abdominal pain       Objective:     Blood pressure 108/66, pulse 74, temperature 36.6 °C (97.9 °F), height 1.6 m (5' 3\"), weight 73 kg (161 lb), SpO2 94 %. Body mass index is 28.52 kg/m². stable   Physical Exam:  Constitutional: Alert, no distress.  Skin: Warm, dry, good turgor,no cyanosis, no rashes in visible areas.  Eye: Equal, round and reactive, conjunctiva clear, lids normal.  Ears: No tenderness, no discharge.  External canals are without any significant edema or erythema.  Tympanic membranes are without any inflammation, no effusion.  Gross auditory acuity is intact.  Nose: symmetrical without tenderness, no discharge.  Mouth/Throat: lips without lesion.  " Oropharynx clear.  Throat without erythema, exudates or tonsillar enlargement.  Neck: Trachea midline, no masses, no obvious thyroid enlargement.. No cervical or supraclavicular lymphadenopathy. Range of motion within normal limits.  Neuro: Cranial nerves 2-12 grossly intact.  No sensory deficit.  Respiratory: Unlabored respiratory effort, lungs clear to auscultation, no wheezes, no ronchi.  Cardiovascular: Normal S1, S2, audible, blowing murmur noted. , no edema.  Psych: Alert and oriented x3, normal affect and mood and judgement.        Assessment and Plan:   The following treatment plan was discussed    1. Malignant melanoma of neck (CMS-HCC)  This is a new problem to me.  Recently had melanoma removed from right neck.  Derm is following    2. Rheumatoid arthritis involving multiple sites with positive rheumatoid factor (CMS-HCC)  This is a new problem to me.  Chronic.  Humira and MTX.  Followed by Dr. Escoto.      3. Bronchitis  This is a new problem to me.  Acute.  Ongoing.  Finish Z pack, fluids, rest, Robitussin CF during day.  Alyson draper pm to improve sleep.  Return to clinic if increased fever, chills or no improvement.        Followup: Return if symptoms worsen or fail to improve.

## 2023-07-19 NOTE — TELEPHONE ENCOUNTER
Called and spoke with patient who states she found out she will complete immunotherapy on 08/29/2023. Patient would like to postpone workup until after treatment.     Patient rescheduled for IC and CTS consultations at her earliest convenience. Advised to reach out with any concerning symptoms prior to appointment. Patient verbalizes understanding.

## 2023-07-27 DIAGNOSIS — R03.0 ELEVATED BLOOD PRESSURE READING WITHOUT DIAGNOSIS OF HYPERTENSION: ICD-10-CM

## 2023-07-27 RX ORDER — AMLODIPINE BESYLATE 2.5 MG/1
2.5 TABLET ORAL
Qty: 90 TABLET | Refills: 3 | Status: SHIPPED | OUTPATIENT
Start: 2023-07-27 | End: 2023-10-31

## 2023-08-17 ENCOUNTER — APPOINTMENT (RX ONLY)
Dept: URBAN - METROPOLITAN AREA CLINIC 22 | Facility: CLINIC | Age: 88
Setting detail: DERMATOLOGY
End: 2023-08-17

## 2023-08-17 DIAGNOSIS — Z86.006 PERSONAL HISTORY OF MELANOMA IN-SITU: ICD-10-CM

## 2023-08-17 DIAGNOSIS — L82.1 OTHER SEBORRHEIC KERATOSIS: ICD-10-CM

## 2023-08-17 DIAGNOSIS — Z85.828 PERSONAL HISTORY OF OTHER MALIGNANT NEOPLASM OF SKIN: ICD-10-CM

## 2023-08-17 DIAGNOSIS — L81.4 OTHER MELANIN HYPERPIGMENTATION: ICD-10-CM

## 2023-08-17 DIAGNOSIS — L72.0 EPIDERMAL CYST: ICD-10-CM

## 2023-08-17 DIAGNOSIS — D22 MELANOCYTIC NEVI: ICD-10-CM

## 2023-08-17 DIAGNOSIS — Z85.820 PERSONAL HISTORY OF MALIGNANT MELANOMA OF SKIN: ICD-10-CM

## 2023-08-17 DIAGNOSIS — D18.0 HEMANGIOMA: ICD-10-CM

## 2023-08-17 DIAGNOSIS — Z71.89 OTHER SPECIFIED COUNSELING: ICD-10-CM

## 2023-08-17 DIAGNOSIS — L57.0 ACTINIC KERATOSIS: ICD-10-CM

## 2023-08-17 PROBLEM — D22.5 MELANOCYTIC NEVI OF TRUNK: Status: ACTIVE | Noted: 2023-08-17

## 2023-08-17 PROBLEM — D18.01 HEMANGIOMA OF SKIN AND SUBCUTANEOUS TISSUE: Status: ACTIVE | Noted: 2023-08-17

## 2023-08-17 PROCEDURE — ? ADDITIONAL NOTES

## 2023-08-17 PROCEDURE — ? LIQUID NITROGEN

## 2023-08-17 PROCEDURE — ? SUNSCREEN RECOMMENDATIONS

## 2023-08-17 PROCEDURE — 17000 DESTRUCT PREMALG LESION: CPT

## 2023-08-17 PROCEDURE — 17003 DESTRUCT PREMALG LES 2-14: CPT

## 2023-08-17 PROCEDURE — ? COUNSELING

## 2023-08-17 PROCEDURE — 99213 OFFICE O/P EST LOW 20 MIN: CPT | Mod: 25

## 2023-08-17 ASSESSMENT — LOCATION SIMPLE DESCRIPTION DERM
LOCATION SIMPLE: RIGHT CLAVICULAR SKIN
LOCATION SIMPLE: LEFT ZYGOMA
LOCATION SIMPLE: ABDOMEN
LOCATION SIMPLE: RIGHT UPPER BACK
LOCATION SIMPLE: RIGHT ANTERIOR NECK
LOCATION SIMPLE: NECK
LOCATION SIMPLE: LEFT PRETIBIAL REGION
LOCATION SIMPLE: LEFT EYEBROW
LOCATION SIMPLE: LEFT UPPER BACK
LOCATION SIMPLE: LEFT CHEEK
LOCATION SIMPLE: LEFT FOREARM
LOCATION SIMPLE: RIGHT CHEEK
LOCATION SIMPLE: POSTERIOR SCALP
LOCATION SIMPLE: RIGHT FOREARM
LOCATION SIMPLE: RIGHT HAND
LOCATION SIMPLE: SCALP
LOCATION SIMPLE: RIGHT LIP
LOCATION SIMPLE: LEFT FOREHEAD
LOCATION SIMPLE: RIGHT EYELID
LOCATION SIMPLE: NOSE
LOCATION SIMPLE: RIGHT ZYGOMA
LOCATION SIMPLE: RIGHT NOSE
LOCATION SIMPLE: LEFT HAND
LOCATION SIMPLE: RIGHT PRETIBIAL REGION
LOCATION SIMPLE: RIGHT POSTERIOR UPPER ARM
LOCATION SIMPLE: CHEST

## 2023-08-17 ASSESSMENT — LOCATION DETAILED DESCRIPTION DERM
LOCATION DETAILED: LEFT SUPERIOR CENTRAL BUCCAL CHEEK
LOCATION DETAILED: RIGHT DISTAL PRETIBIAL REGION
LOCATION DETAILED: LEFT LATERAL ABDOMEN
LOCATION DETAILED: RIGHT UPPER CUTANEOUS LIP
LOCATION DETAILED: LEFT LATERAL EYEBROW
LOCATION DETAILED: RIGHT MID PREAURICULAR CHEEK
LOCATION DETAILED: LEFT LATERAL SUPERIOR CHEST
LOCATION DETAILED: RIGHT MEDIAL CANTHUS
LOCATION DETAILED: LEFT PROXIMAL DORSAL FOREARM
LOCATION DETAILED: RIGHT CENTRAL LATERAL NECK
LOCATION DETAILED: RIGHT DISTAL DORSAL FOREARM
LOCATION DETAILED: RIGHT LATERAL SUPERIOR CHEST
LOCATION DETAILED: RIGHT MEDIAL ZYGOMA
LOCATION DETAILED: LEFT SUPERIOR MEDIAL MALAR CHEEK
LOCATION DETAILED: LEFT POSTERIOR PARIETAL SCALP
LOCATION DETAILED: UPPER STERNUM
LOCATION DETAILED: RIGHT NASAL ALA
LOCATION DETAILED: RIGHT CLAVICULAR SKIN
LOCATION DETAILED: RIGHT SUPERIOR ANTERIOR NECK
LOCATION DETAILED: LEFT MEDIAL ZYGOMA
LOCATION DETAILED: LEFT DORSAL THUMB METACARPOPHALANGEAL JOINT
LOCATION DETAILED: LEFT SUPERIOR PARIETAL SCALP
LOCATION DETAILED: NASAL DORSUM
LOCATION DETAILED: LEFT INFERIOR UPPER BACK
LOCATION DETAILED: RIGHT PROXIMAL LATERAL POSTERIOR UPPER ARM
LOCATION DETAILED: LEFT CENTRAL POSTAURICULAR SKIN
LOCATION DETAILED: NASAL SUPRATIP
LOCATION DETAILED: RIGHT RADIAL DORSAL HAND
LOCATION DETAILED: LEFT DISTAL PRETIBIAL REGION
LOCATION DETAILED: POSTERIOR MID-PARIETAL SCALP
LOCATION DETAILED: LEFT SUPERIOR FOREHEAD
LOCATION DETAILED: LEFT INFERIOR LATERAL FOREHEAD
LOCATION DETAILED: LEFT LATERAL MALAR CHEEK
LOCATION DETAILED: RIGHT MID-UPPER BACK

## 2023-08-17 ASSESSMENT — LOCATION ZONE DERM
LOCATION ZONE: LEG
LOCATION ZONE: EYELID
LOCATION ZONE: HAND
LOCATION ZONE: TRUNK
LOCATION ZONE: ARM
LOCATION ZONE: NECK
LOCATION ZONE: NOSE
LOCATION ZONE: LIP
LOCATION ZONE: FACE
LOCATION ZONE: SCALP

## 2023-08-17 NOTE — PROCEDURE: ADDITIONAL NOTES
Render Risk Assessment In Note?: no
Detail Level: Simple
Additional Notes: Discussed seeing JOSÉ Baer for further treatment if desired

## 2023-08-17 NOTE — PROCEDURE: MIPS QUALITY
Quality 431: Preventive Care And Screening: Unhealthy Alcohol Use - Screening: Patient not identified as an unhealthy alcohol user when screened for unhealthy alcohol use using a systematic screening method
Detail Level: Detailed
Quality 402: Tobacco Use And Help With Quitting Among Adolescents: Patient screened for tobacco and never smoked
Quality 111:Pneumonia Vaccination Status For Older Adults: Patient received any pneumococcal conjugate or polysaccharide vaccine on or after their 60th birthday and before the end of the measurement period

## 2023-08-17 NOTE — PROCEDURE: LIQUID NITROGEN
Render Post-Care Instructions In Note?: no
Detail Level: Detailed
Show Aperture Variable?: Yes
Duration Of Freeze Thaw-Cycle (Seconds): 3
Post-Care Instructions: I reviewed with the patient in detail post-care instructions. Patient is to wear sunprotection, and avoid picking at any of the treated lesions. Pt may apply Vaseline to crusted or scabbing areas.
Number Of Freeze-Thaw Cycles: 2 freeze-thaw cycles
Consent: The patient's consent was obtained including but not limited to risks of crusting, scabbing, blistering, scarring, darker or lighter pigmentary change, recurrence, incomplete removal and infection.

## 2023-08-23 ENCOUNTER — TELEPHONE (OUTPATIENT)
Dept: VASCULAR LAB | Facility: MEDICAL CENTER | Age: 88
End: 2023-08-23
Payer: MEDICARE

## 2023-08-23 NOTE — TELEPHONE ENCOUNTER
[facility]    Received anticoagulation clearance from [facility] regarding upcoming EGD.    Procedure date TBD    Pt is on rivaroxaban (Xarelto) for atrial fibrillation and DVT.    Per current CHEST guidelines, pt is at moderate risk of VTE given [id number] score of 6 and no VTE in the last 12 months.        Per current guidelines/package insert, pt will need to hold 1 day prior to procedure, then restart ~24 hours post-op under the operating provider's discretion.        Faxed clearance to [phone]; (will send to [facility] to scan in media)    Called and left VM for patient. Requested for patient to call back..    Martin Crespo, PerlitaD, BCACP

## 2023-09-10 NOTE — PROGRESS NOTES
REFERRING PHYSICIAN: Haseeb Serrano MD.     CONSULTING PHYSICIAN: Alexandra Daniels MD     CHIEF COMPLAINT: Fatigue    HISTORY OF PRESENT ILLNESS: The patient is a 88 y.o. 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  She has associated chest tightness.  She was seen by her cardiologist and underwent echocardiogram and left heart catheterization which shows a progression to severe aortic stenosis.  She is currently receiving immunotherapy.  She denies syncope and dizziness.        PAST MEDICAL HISTORY:   Active Ambulatory Problems     Diagnosis Date Noted    Right bundle branch block 06/27/2012    Essential hypertension, benign 06/27/2012    Other hyperlipidemia 06/27/2012    GERD (gastroesophageal reflux disease) 06/27/2012    Back pain 06/27/2012    Deep vein thrombosis - recurrent, on chronic anticoagulation 03/08/2016    Rheumatoid arthritis involving multiple sites with positive rheumatoid factor (HCC) 03/14/2016    Rheumatoid nodule (HCC) 07/25/2017    Malignant melanoma of skin of left leg (HCC) 10/02/2017    Severe aortic stenosis 11/02/2017    History of colon cancer - s/p resection 10/30/2018    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    Advanced care planning/counseling discussion 08/19/2019    Osteoporosis 04/20/2020    Long term (current) use of anticoagulants 06/01/2020    Bilateral knee pain 07/24/2020    Onychomycosis 12/01/2020    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    Hip asymmetry 08/22/2022    History of recurrent deep vein thrombosis (DVT) 08/22/2022    Spondylolisthesis, lumbar region 09/14/2022     Lumbar stenosis with neurogenic claudication 09/21/2022    Carotid atherosclerosis, bilateral 12/15/2022    Protein S deficiency (HCC) 12/15/2022    Presence of IVC filter 03/09/2023    Dermoid cyst 06/22/2023     Resolved Ambulatory Problems     Diagnosis Date Noted    Nonspecific abnormal electrocardiogram (ECG) (EKG) 06/27/2012    Hyperglycemia 06/27/2012    Deep venous thrombosis (HCC) 06/27/2012    Atrial fibrillation [I48.91] 04/19/2016    Hernia, inguinal, right 09/23/2016    Bronchitis 10/02/2017    Urinary incontinence 11/02/2017    Impaired fasting glucose 11/02/2017    Left leg pain 08/29/2018    Flank pain 08/22/2019    Right leg injury 04/12/2021    Dysuria 07/02/2021    Diarrhea 01/11/2022    Thrombus 09/25/2022    Constipation 09/27/2022    Chest pain 09/27/2022    Retroperitoneal hematoma 10/03/2022    Retroperitoneal bleed 10/04/2022    Acute blood loss anemia 10/05/2022    Secondary hypercoagulable state (HCC) 12/15/2022     Past Medical History:   Diagnosis Date    Adenocarcinoma of colon (HCC) 10/30/2018    Anesthesia     Blood clotting disorder (HCC) 2012    Bowel habit changes     Breath shortness 06/26/2023    Cancer (HCC)     Cataract     Chronic back pain greater than 3 months duration     Deep vein blood clot of left lower extremity (HCC) 2022    Heart murmur     High cholesterol     Hyperlipidemia     Hypertension     Melanoma (HCC)     Mild aortic stenosis     Other and unspecified hyperlipidemia 06/27/2012    Prediabetes        PAST SURGICAL HISTORY:   Past Surgical History:   Procedure Laterality Date    LUMBAR FUSION O-ARM  09/21/2022    Procedure: L3-4 and L4-5 decompressive laminectomy, bilateral foraminotomies and L3-4-5 posterior lumbar instrumented fusion;  Surgeon: Rosa M Bonner M.D.;  Location: SURGERY [facility];  Service: Neurosurgery    LUMBAR DECOMPRESSION  09/21/2022    Procedure: DECOMPRESSION, SPINE, LUMBAR;  Surgeon: Rosa M Bonner M.D.;  Location: [facility]  [place];  Service: Neurosurgery    LUMBAR LAMINECTOMY DISKECTOMY  09/21/2022    Procedure: LAMINECTOMY, SPINE, LUMBAR, WITH DISCECTOMY;  Surgeon: Rosa M Bonner M.D.;  Location: SURGERY [facility];  Service: Neurosurgery    FORAMINOTOMY  09/21/2022    Procedure: FORAMINOTOMY, SPINE;  Surgeon: Rosa M Bonner M.D.;  Location: SURGERY [facility];  Service: Neurosurgery    PA INJECTION,SACROILIAC JOINT Right 07/12/2022    Procedure: RIGHT sacroiliac joint injection with fluoroscopic guidance.;  Surgeon: Indira Lee M.D.;  [facility];  Service: Pain Management    PA REMOVE ARMPITS LYMPH NODES COMPLT Left 06/07/2022    Procedure: LYMPHADENECTOMY, AXILLARY;  Surgeon: Bernardino Torres M.D.;  Location: SURGERY SAME DAY [facility];  Service: General    BLOCK EPIDURAL STEROID INJECTION Right 05/31/2022    Procedure: RIGHT L4-5 and L5-S1 transforaminal epidural steroid injection with fluoroscopic guidance.;  Surgeon: Indira Lee M.D.;  Location: [facility];  Service: Pain Management    BLOCK EPIDURAL STEROID INJECTION Right 05/10/2022    Procedure: Lumbar L4-5 interlaminar epidural steroid injection;  Surgeon: Indira Lee M.D.;  Location: [facility];  Service: Pain Management    WIDE EXCISION, LESION, HEAD AND NECK REGION Left 03/29/2022    Procedure: WIDE EXCISION, LESION, HEAD AND NECK REGION - RADICAL MALIGNANT MELANOMA OF LEFT CHEST;  Surgeon: Bernardino oTrres M.D.;  Location: SURGERY SAME DAY [facility];  Service: General    LUMBAR MEDIAL BRANCH BLOCKS Bilateral 12/15/2020    Procedure: BLOCK, NERVE, SPINAL, LUMBAR, POSTERIOR RAMUS, MEDIAL BRANCH;  Surgeon: Chris Cooper M.D.;  Location: [facility];  Service: Pain Management    IRRIGATION & DEBRIDEMENT ORTHO Bilateral 07/31/2020    Procedure: IRRIGATION AND DEBRIDEMENT, WOUND - KNEE;  Surgeon: Roosevelt Saucedo M.D.;  Location: [facility];  Service: Orthopedics     HEMICOLECTOMY Right 12/13/2018    Procedure: OPEN RIGHT HEMICOLECTOMY;  Surgeon: Dash Bhagat M.D.;  Location: SURGERY [facility];  Service: General    INGUINAL HERNIA REPAIR Right 09/23/2016    Procedure: INGUINAL HERNIA REPAIR - PRIMARY;  Surgeon: Mary Medina M.D.;  Location: SURGERY [facility];  Service:     OTHER  08/12/2014    peroneal nerve surgery Dr. Castro     OTHER ORTHOPEDIC SURGERY  2011    meniscus repair    ARTHROPLASTY Left     hip replacement    CHOLECYSTECTOMY      HYSTERECTOMY, TOTAL ABDOMINAL  1970's    KNEE ARTHROPLASTY TOTAL Left     OTHER Left     Upper chest, skin cancer removed    ROTATOR CUFF REPAIR Bilateral         ALLERGIES:   Allergies   Allergen Reactions    Wound Dressing Adhesive      Pt says band-aids cause skin reaction/rash if on for more than 2 days  Paper tape OK          CURRENT MEDICATIONS:   Current Outpatient Medications:     amLODIPine (NORVASC) 2.5 MG Tab, Take 1 Tablet by mouth at bedtime., Disp: 90 Tablet, Rfl: 3    losartan (COZAAR) 50 MG Tab, Take 1 Tablet by mouth at bedtime., Disp: 90 Tablet, Rfl: 3    omeprazole (PRILOSEC) 20 MG delayed-release capsule, Take 1 Capsule by mouth every morning., Disp: 90 Capsule, Rfl: 3    diphenhydrAMINE-APAP, sleep,  MG Tab, Take 2 Tablets by mouth at bedtime., Disp: , Rfl:     lidocaine (LIDODERM) 5 % Patch, APPLY 1 PATCH TO SKIN DAILY FOR 12 HOURS ON THEN 12 HOURS OFF, Disp: , Rfl:     SODIUM FLUORIDE 5000 PLUS 1.1 % Cream, BRUSH WITH PEA SIZEED AMOUNT ONCE A DAY PREFERABLY BEFORE BEDTIME. SPIT OUT ALL EXCESS. DO NOT RINSE, Disp: , Rfl:     atorvastatin (LIPITOR) 20 MG Tab, Take 1 Tablet by mouth every evening., Disp: 90 Tablet, Rfl: 3    gabapentin (NEURONTIN) 300 MG Cap, Take 300 mg by mouth 3 times a day. 300 mg in AM, 600 mg in PM, Disp: , Rfl:     Multiple Vitamins-Minerals (PRESERVISION AREDS 2 PO), Take 1 Capsule by mouth 2 times a day., Disp: , Rfl:     Calcium Carbonate 600 MG Tab, Take 600 mg  by mouth every morning., Disp: , Rfl:     rivaroxaban (XARELTO) 20 MG Tab tablet, Take 1 Tablet by mouth with dinner., Disp: 90 Tablet, Rfl: 3    polyethylene glycol/lytes (MIRALAX) 17 g Pack, Take 1 Packet by mouth 1 time a day as needed (if sennosides and docusate ineffective after 24 hours)., Disp: , Rfl: 3    acetaminophen (TYLENOL) 500 MG Tab, Take 650 mg by mouth 3 times a day as needed for Mild Pain. 1.5 in am, 1 tab at noon, 2 in pm  Indications: Pain, Disp: , Rfl:     melatonin 5 mg Tab, Take 10 mg by mouth every evening., Disp: , Rfl:     vitamin D (CHOLECALCIFEROL) 1000 UNIT Tab, Take 1,000 Units by mouth every morning., Disp: , Rfl:     FAMILY HISTORY:   Family History   Problem Relation Age of Onset    Cancer Mother         stomach- ruptured appendix    Cancer Father         colon    Leukemia Father         many exposure, worked for [employer]     Heart Disease Sister     Heart Disease Brother         s/p stent     Other Son         on blood thinner, unclear etiology    Clotting Disorder Neg Hx         SOCIAL HISTORY:   Social History     Socioeconomic History    Marital status:      Spouse name: Not on file    Number of children: Not on file    Years of education: Not on file    Highest education level: Not on file   Occupational History    Not on file   Tobacco Use    Smoking status: Never    Smokeless tobacco: Never   Vaping Use    Vaping Use: Never used   Substance and Sexual Activity    Alcohol use: Yes     Comment: very rare    Drug use: Never    Sexual activity: Not Currently     Partners: Male     Comment:     Other Topics Concern    Not on file   Social History Narrative    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 Negative for chills, fever and weight loss.   HENT:  Negative for ear pain, nosebleeds and tinnitus.    Eyes:  Negative for double vision, photophobia and pain.   Respiratory:  Positive for shortness of breath. Negative for cough and hemoptysis.    Cardiovascular:  Positive for chest pain (chest \"tightness\" with exertion). Negative for palpitations, orthopnea, leg swelling and PND.   Gastrointestinal:  Negative for abdominal pain, blood in stool, nausea and vomiting.   Genitourinary:  Negative for frequency, hematuria and urgency.   Musculoskeletal: Negative.    Skin:  Negative for rash.   Neurological:  Negative for dizziness, tremors, speech change, focal weakness, seizures and headaches.   Endo/Heme/Allergies:  Negative for polydipsia. Does not bruise/bleed easily.   Psychiatric/Behavioral:  Negative for hallucinations and memory loss.          PHYSICAL EXAMINATION:    /70 (BP Location: Right arm, Patient Position: Sitting, BP Cuff Size: Adult)   Pulse 70   Temp 36.4 °C (97.5 °F) (Temporal)   Ht 1.6 m (5' 3\")   Wt 71.7 kg (158 lb)   LMP  (LMP Unknown)   SpO2 97%   BMI 27.99 kg/m²    Physical Exam  Vitals reviewed.   Constitutional:       General: She is not in acute distress.     Appearance: Normal appearance.   HENT:      Head: Normocephalic and atraumatic.      Right Ear: External ear normal.      Left Ear: External ear normal.      Nose: Nose normal. No congestion.   Eyes:      General: No scleral icterus.     Extraocular Movements: Extraocular movements intact.      Conjunctiva/sclera: Conjunctivae normal.   Cardiovascular:      Rate and Rhythm: Normal rate and regular rhythm.   Pulmonary:      Effort: Pulmonary effort is normal. No respiratory distress.   Abdominal:      General: Abdomen is flat. There is no distension. "   Musculoskeletal:         General: Normal range of motion.      Cervical back: Normal range of motion.      Comments: Ambulates with cane   Skin:     General: Skin is warm and dry.   Neurological:      Mental Status: She is alert and oriented to person, place, and time. Mental status is at baseline.      Cranial Nerves: No cranial nerve deficit.   Psychiatric:         Mood and Affect: Mood normal.         Judgment: Judgment normal.            LABS REVIEWED:  Lab Results   Component Value Date/Time    SODIUM 140 06/27/2023 10:42 AM    POTASSIUM 4.6 06/27/2023 10:42 AM    CHLORIDE 101 06/27/2023 10:42 AM    CO2 28 06/27/2023 10:42 AM    GLUCOSE 92 06/27/2023 10:42 AM    BUN 32 (H) 06/27/2023 10:42 AM    CREATININE 0.89 06/27/2023 10:42 AM      Lab Results   Component Value Date/Time    PROTHROMBTM 19.0 (H) 06/27/2023 10:42 AM    INR 1.64 (H) 06/27/2023 10:42 AM      Lab Results   Component Value Date/Time    WBC 6.7 06/27/2023 10:42 AM    RBC 4.10 (L) 06/27/2023 10:42 AM    HEMOGLOBIN 12.5 06/27/2023 10:42 AM    HEMATOCRIT 38.1 06/27/2023 10:42 AM    MCV 92.9 06/27/2023 10:42 AM    MCH 30.5 06/27/2023 10:42 AM    MCHC 32.8 06/27/2023 10:42 AM    MPV 12.1 06/27/2023 10:42 AM    NEUTSPOLYS 61.50 05/24/2023 08:24 PM    LYMPHOCYTES 19.30 (L) 05/24/2023 08:24 PM    MONOCYTES 9.90 05/24/2023 08:24 PM    EOSINOPHILS 8.10 (H) 05/24/2023 08:24 PM    BASOPHILS 1.00 05/24/2023 08:24 PM        IMAGING REVIEWED AND INTERPRETED:    ECHOCARDIOGRAM 6/9/23 [facility]  Normal LV systolic function  Moderate concntric LVH  Moderate to severe Aortic stenosis  Mild mitral stenosis with heavy mitral annular calcification    CARDIAC CATHETERIZATION 6/29/23 [facility]  HEMODYNAMICS:   Mean right atrial pressure: 3 mmHg  Right ventricular pressure: 31/4 mmHg  Pulmonary artery pressure: 39/13/22 mmHg  Mean pulmonary capillary wedge pressure: 12 mmHg  Thermodilution cardiac output: 4.8 L/min  Thermodilution cardiac index: 2.8 L/min/m²  Paula cardiac index:  3.7 L/min  Paula cardiac index: 2.1 L/min/m²  Aortic pressure: 118/58 mmHg  Left ventricular pressure: 171/5 mmHg  Mean transaortic gradient: 44 mmHg  Calculated aortic valve area: 0.72 cm²     CORONARY ANGIOGRAPHY:  The left main coronary artery is patent and trifurcates into the left anterior descending, ramus intermedius, and left circumflex coronary arteries.  The left anterior descending coronary artery is a moderate, transapical vessel with focal calcific mid 30% disease after the takeoff of the first diagonal branch and otherwise minimal luminal irregularities in the distribution.  The ramus intermedius is a small-caliber vessel with no angiographically significant disease.  The left circumflex coronary artery is a small, nondominant vessel that supplies several small obtuse marginal branches and has no angiographically significant disease.  The right coronary artery is a large, dominant vessel that supplies a large posterior descending coronary and a large posterolateral branch and has minimal luminal irregularities in the distribution.     ASCENDING AORTOGRAPHY:  Normal ascending aorta diameter at 2.6 mm  No significant aortic regurgitation..     DISTAL AORTOGRAPHY WITH ILIOFEMORAL RUN-OFF:  Tortuous bilateral iliofemoral arteries with luminal irregularities.  Right common femoral artery diameter 6.8 mm  Left common femoral artery diameter 6.5 mm     IMPRESSION:  Severe aortic stenosis by invasive assessment.  Normal left heart filling pressures.  Normal right heart filling pressures.  Normal pulmonary artery pressures for age.  Minimal nonobstructive coronary artery disease.  Minimal nonobstructive iliofemoral arterial disease.    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 We will continue to follow workup and discuss in multidisciplinary conference.    The STS mortality risk score is 4.2% and the morbidity and mortality risk score is 14%. The scores were discussed with patient.    Thank you for this very challenging consultation and participation in the patient’s care.  I will keep you apprised of all future developments.      Sincerely,     Alexandra Daniels MD

## 2023-09-12 ENCOUNTER — HOSPITAL ENCOUNTER (OUTPATIENT)
Dept: RADIOLOGY | Facility: MEDICAL CENTER | Age: 88
End: 2023-09-12
Attending: INTERNAL MEDICINE
Payer: MEDICARE

## 2023-09-13 ENCOUNTER — DOCUMENTATION (OUTPATIENT)
Dept: CARDIOLOGY | Facility: MEDICAL CENTER | Age: 88
End: 2023-09-13

## 2023-09-13 ENCOUNTER — OFFICE VISIT (OUTPATIENT)
Dept: CARDIOTHORACIC SURGERY | Facility: MEDICAL CENTER | Age: 88
End: 2023-09-13
Payer: MEDICARE

## 2023-09-13 ENCOUNTER — OFFICE VISIT (OUTPATIENT)
Dept: CARDIOLOGY | Facility: MEDICAL CENTER | Age: 88
End: 2023-09-13
Attending: INTERNAL MEDICINE
Payer: MEDICARE

## 2023-09-13 VITALS
OXYGEN SATURATION: 96 % | DIASTOLIC BLOOD PRESSURE: 74 MMHG | BODY MASS INDEX: 28.14 KG/M2 | SYSTOLIC BLOOD PRESSURE: 126 MMHG | HEIGHT: 63 IN | WEIGHT: 158.8 LBS | RESPIRATION RATE: 16 BRPM | HEART RATE: 72 BPM

## 2023-09-13 VITALS
BODY MASS INDEX: 28 KG/M2 | WEIGHT: 158 LBS | DIASTOLIC BLOOD PRESSURE: 70 MMHG | TEMPERATURE: 97.5 F | HEIGHT: 63 IN | OXYGEN SATURATION: 97 % | SYSTOLIC BLOOD PRESSURE: 122 MMHG | HEART RATE: 70 BPM

## 2023-09-13 DIAGNOSIS — Z00.6 EXAMINATION OF PARTICIPANT IN CLINICAL TRIAL: ICD-10-CM

## 2023-09-13 DIAGNOSIS — Z86.718 HISTORY OF RECURRENT DEEP VEIN THROMBOSIS (DVT): ICD-10-CM

## 2023-09-13 DIAGNOSIS — I35.0 NONRHEUMATIC AORTIC (VALVE) STENOSIS: ICD-10-CM

## 2023-09-13 DIAGNOSIS — I35.0 SEVERE AORTIC STENOSIS: ICD-10-CM

## 2023-09-13 DIAGNOSIS — C43.59 MALIGNANT MELANOMA OF CHEST WALL (HCC): ICD-10-CM

## 2023-09-13 DIAGNOSIS — Z95.828 PRESENCE OF IVC FILTER: ICD-10-CM

## 2023-09-13 DIAGNOSIS — I45.10 RIGHT BUNDLE BRANCH BLOCK: ICD-10-CM

## 2023-09-13 PROBLEM — I82.90 THROMBUS: Status: RESOLVED | Noted: 2022-09-25 | Resolved: 2023-09-13

## 2023-09-13 PROBLEM — K68.3 RETROPERITONEAL HEMATOMA: Status: RESOLVED | Noted: 2022-10-03 | Resolved: 2023-09-13

## 2023-09-13 PROBLEM — R19.7 DIARRHEA: Status: RESOLVED | Noted: 2022-01-11 | Resolved: 2023-09-13

## 2023-09-13 PROBLEM — D62 ACUTE BLOOD LOSS ANEMIA: Status: RESOLVED | Noted: 2022-10-05 | Resolved: 2023-09-13

## 2023-09-13 PROCEDURE — 99215 OFFICE O/P EST HI 40 MIN: CPT | Performed by: INTERNAL MEDICINE

## 2023-09-13 PROCEDURE — 3074F SYST BP LT 130 MM HG: CPT | Performed by: INTERNAL MEDICINE

## 2023-09-13 PROCEDURE — 99213 OFFICE O/P EST LOW 20 MIN: CPT | Performed by: INTERNAL MEDICINE

## 2023-09-13 PROCEDURE — 3078F DIAST BP <80 MM HG: CPT | Performed by: THORACIC SURGERY (CARDIOTHORACIC VASCULAR SURGERY)

## 2023-09-13 PROCEDURE — 99205 OFFICE O/P NEW HI 60 MIN: CPT | Performed by: THORACIC SURGERY (CARDIOTHORACIC VASCULAR SURGERY)

## 2023-09-13 PROCEDURE — 3078F DIAST BP <80 MM HG: CPT | Performed by: INTERNAL MEDICINE

## 2023-09-13 PROCEDURE — 3074F SYST BP LT 130 MM HG: CPT | Performed by: THORACIC SURGERY (CARDIOTHORACIC VASCULAR SURGERY)

## 2023-09-13 ASSESSMENT — FIBROSIS 4 INDEX
FIB4 SCORE: 2.11
FIB4 SCORE: 2.11

## 2023-09-13 ASSESSMENT — ENCOUNTER SYMPTOMS
EYE PAIN: 0
DIZZINESS: 0
ORTHOPNEA: 0
BLOOD IN STOOL: 0
PHOTOPHOBIA: 0
NAUSEA: 0
HEMOPTYSIS: 0
PALPITATIONS: 0
PND: 0
DOUBLE VISION: 0
FEVER: 0
POLYDIPSIA: 0
SHORTNESS OF BREATH: 1
FOCAL WEAKNESS: 0
HALLUCINATIONS: 0
BRUISES/BLEEDS EASILY: 0
SPEECH CHANGE: 0
MEMORY LOSS: 0
HEADACHES: 0
ABDOMINAL PAIN: 0
COUGH: 0
MUSCULOSKELETAL NEGATIVE: 1
WEIGHT LOSS: 0
CHILLS: 0
TREMORS: 0
SEIZURES: 0
VOMITING: 0

## 2023-09-13 ASSESSMENT — PATIENT HEALTH QUESTIONNAIRE - PHQ9: CLINICAL INTERPRETATION OF PHQ2 SCORE: 0

## 2023-09-13 NOTE — PROGRESS NOTES
CARDIOLOGY STRUCTURAL HEART FOLLOWUP    PCP: KINSEY Horner.  REFERRING: Dr. Van    1. Severe aortic stenosis    2. Right bundle branch block    3. Malignant melanoma of chest wall (HCC)    4. History of recurrent deep vein thrombosis (DVT)    5. Presence of IVC filter        Marry Mathur has class II-III symptoms due to stage D1 aortic stenosis.  We discussed the natural history as well as life-threatening nature of the condition.  I advised proceeding with transcatheter aortic valve replacement.    We discussed the risks, benefits and alternatives to TAVR including but not limited to a 10% risk of pacemaker, 5% risk of bleeding/access site complication, 2% risk of stroke, risk of contrast nephropathy and 2% risk of mortality. The patient is in agreement with proceeding.  I did explain to her the heightened risks of pacemaker given the underlying right bundle branch block.    She has done well with temporary interruption of anticoagulation in the past and thus will be okay to hold Xarelto without bridging anticoagulation during the TAVR.    Follow up: 6 weeks    History: Marry Mathur is a 88 y.o. 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  Echocardiogram showed low gradient and she was reporting symptoms and so underwent a catheterization which confirmed a mean gradient in the severe range at 44 mmHg.  She reports ongoing declines in exertional capacities and struggles to do more than light yard work before tiring and needing a rest.    She has historically used bridging anticoagulation but on a couple recent occasions held Xarelto for 48 to 72 hours without any difficulty.      PE:  /74 (BP Location: Right arm, Patient Position: Sitting, BP Cuff Size: Adult)   Pulse 72    "Resp 16   Ht 1.6 m (5' 3\")   Wt 72 kg (158 lb 12.8 oz)   LMP  (LMP Unknown)   SpO2 96%   BMI 28.13 kg/m²   GEN: NAD  CARDIAC: +MAYUR late peaking harsh regular no JVP Normal S1 Diminished S2  VASCULATURE: diminished and delayed carotid pulse  RESP: Clear to auscultation bilaterally  ABD: Soft, non-tender, non-distended  EXT: No edema  NEURO: No focal deficit    Past Medical History:   Diagnosis Date    Adenocarcinoma of colon (HCC) 10/30/2018    From sessile serrated polyp 10/2018    Anesthesia     pt states \"one new dr scraped my esophagus when they put the tube in and I started bleeding so they couldn't operate\"     Back pain 06/01/2022    0/10    Blood clotting disorder (HCC) 2012    clot in leg    Bowel habit changes     constipation     Breath shortness 06/26/2023    with exertion    Cancer (HCC)     skin, colon 2018    Cataract     belia IOL     Chronic back pain greater than 3 months duration     Deep vein blood clot of left lower extremity (HCC) 2022    Deep vein thrombosis (HCC) 03/08/2016    First occurrence in [facility] in late 1970s Second occurrence further up in [facility] in 2012, has been on AC since     Essential hypertension, benign 06/27/2012    GERD (gastroesophageal reflux disease) 06/27/2012    Heart murmur     High cholesterol     Hyperlipidemia     Hypertension     hx of, not currently     Impaired fasting glucose 11/02/2017    Melanoma ([facility])     Mild aortic stenosis     Seeing Dr. Van    Other and unspecified hyperlipidemia 06/27/2012    Prediabetes     Protein S deficiency ([facility]) 11/02/2017    Noted in lab work 2013 as a part of work up at [facility]     Rheumatoid arthritis involving multiple sites with positive rheumatoid factor ([facility]) 03/14/2016    Dr. Escoto    Rheumatoid nodule ([facility]) 07/25/2017    Right bundle branch block 06/27/2012    pt denies     Urinary incontinence 11/02/2017     Allergies   Allergen Reactions    Wound Dressing Adhesive      Pt says band-aids cause skin reaction/rash if " on for more than 2 days  Paper tape OK       Outpatient Encounter Medications as of 9/13/2023   Medication Sig Dispense Refill    amLODIPine (NORVASC) 2.5 MG Tab Take 1 Tablet by mouth at bedtime. 90 Tablet 3    losartan (COZAAR) 50 MG Tab Take 1 Tablet by mouth at bedtime. 90 Tablet 3    omeprazole (PRILOSEC) 20 MG delayed-release capsule Take 1 Capsule by mouth every morning. 90 Capsule 3    diphenhydrAMINE-APAP, sleep,  MG Tab Take 2 Tablets by mouth at bedtime.      lidocaine (LIDODERM) 5 % Patch APPLY 1 PATCH TO SKIN DAILY FOR 12 HOURS ON THEN 12 HOURS OFF      SODIUM FLUORIDE 5000 PLUS 1.1 % Cream BRUSH WITH PEA SIZEED AMOUNT ONCE A DAY PREFERABLY BEFORE BEDTIME. SPIT OUT ALL EXCESS. DO NOT RINSE      atorvastatin (LIPITOR) 20 MG Tab Take 1 Tablet by mouth every evening. 90 Tablet 3    gabapentin (NEURONTIN) 300 MG Cap Take 300 mg by mouth 3 times a day. 300 mg in AM, 600 mg in PM      Multiple Vitamins-Minerals (PRESERVISION AREDS 2 PO) Take 1 Capsule by mouth 2 times a day.      Calcium Carbonate 600 MG Tab Take 600 mg by mouth every morning.      rivaroxaban (XARELTO) 20 MG Tab tablet Take 1 Tablet by mouth with dinner. 90 Tablet 3    polyethylene glycol/lytes (MIRALAX) 17 g Pack Take 1 Packet by mouth 1 time a day as needed (if sennosides and docusate ineffective after 24 hours).  3    acetaminophen (TYLENOL) 500 MG Tab Take 650 mg by mouth 3 times a day as needed for Mild Pain. 1.5 in am, 1 tab at noon, 2 in pm  Indications: Pain      melatonin 5 mg Tab Take 10 mg by mouth every evening.      vitamin D (CHOLECALCIFEROL) 1000 UNIT Tab Take 1,000 Units by mouth every morning.      [DISCONTINUED] Nivolumab (OPDIVO IV) Infuse  into a venous catheter.       No facility-administered encounter medications on file as of 9/13/2023.     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      Social Determinants of Health     Financial Resource Strain: Not on file   Food Insecurity: Not on file   Transportation Needs: Not on file   Physical Activity: Not on file   Stress: Not on file   Social Connections: Not on file   Intimate Partner Violence: Not on file   Housing Stability: Not on file       Studies  Lab Results   Component Value Date/Time    CHOLSTRLTOT 176 03/24/2023 08:22 AM     (H) 03/24/2023 08:22 AM    HDL 56 03/24/2023 08:22 AM    TRIGLYCERIDE 96 03/24/2023 08:22 AM       Lab Results   Component Value Date/Time    SODIUM 140 06/27/2023 10:42 AM    POTASSIUM 4.6 06/27/2023 10:42 AM    CHLORIDE 101 06/27/2023 10:42 AM    CO2 28 06/27/2023 10:42 AM    GLUCOSE 92 06/27/2023 10:42 AM    BUN 32 (H) 06/27/2023 10:42 AM    CREATININE 0.89 06/27/2023 10:42 AM     Lab Results   Component Value Date/Time    ALKPHOSPHAT 116 (H) 06/27/2023 10:42 AM    ASTSGOT 21 06/27/2023 10:42 AM    ALTSGPT 27 06/27/2023 10:42 AM    TBILIRUBIN 0.5 06/27/2023 10:42 AM             Chief Complaint   Patient presents with    Aortic Stenosis     ROS:   10 point review systems is otherwise negative except as per the HPI

## 2023-09-13 NOTE — PROGRESS NOTES
"Valve Program Consultation: 09/13/2023 for tentative TAVR    Mental Status Assessment:  Appearance: normal  Behavior: normal  Mood/Affect: normal  Thought process/content: normal  Cognition: normal  Functional ability: slightly limited, uses cane  Dental Concerns: natural teeth; patient denies s/s of active oral infection/irritation  Further mental assessment needed: no    Post-op Plan of Care:  Support systems: Lee (friend), other friends, ; patient alone at consult today  Patient understands discharge plan: yes  DME: cane, wears glasses  Home health warranted prior to procedure: no  Social concerns for discharge: none  PCP alerted of social concerns: n/a  POLST: none  Advance directive: DPOA-HC on file dated 08/31/2023  Flu/PNA/COVID vaccines completed: Advised patient not to have any new vaccinations 3 days prior to procedure or 1 week post procedure  Post-op goal: \"have more energy, be able to can fruits and veggies\"  [place] ([place]  Medication instructions: Hold all vitamins/supplements 7 days prior, hold Xarelto 48 hours prior, hold Losartan 24 hours prior to procedure    Concerns prior to procedure: none    Met with patient during New TAVR consult.     All patient's questions and concerns were addressed during this visit. They understood pre-operative and post-operative plan of care.    Reviewed patient TAVR education packet explaining that information is provided regarding preparation for TAVR the night prior, what to expect during the hospital stay, average LOS, and what to look out for post TAVR discharge. Explained that patient will require SBE prophylactic antibiotic prior to any dental treatment post TAVR.     Explained that patient should not eat or drink anything past midnight the day of the procedure. Encouraged patient to wear something clean and comfortable, easy to get on/off to check in Monday morning, time TBD. Patient may have friends and family in the pre-operational " area until patient is taken to the operating room, at which time family and friends will be asked to wait on floor 1 [facility] surgical waiting area. On completion of TAVR, heart team will update family. Once update is given, there is some time before family/friends may visit patient in their assigned room. Length of stay on average is one night, however patient may stay longer depending on specific needs at that time. Patient given printed instructions sheet with all above stated instructions. Patient states understanding of all material and education presented today and has no further questions at this time. Encouraged patient again, to contact me with any questions or concerns during this work-up process. Patient states understanding.

## 2023-09-13 NOTE — PROGRESS NOTES
Valve Program Functional Assessment:     KCCQ12   1a) Showering/bathin  1b) Walking 1 block on ground: 4  1c) Hurrying or jogging:3  2) Swellin  3) Fatigue: 1  4) Shortness of breath: 5  5) Sleep sitting up: 5  6) Limited enjoyment of life: 2  7) Spend the rest of your life with HF: 1  8a) Hobbies, recreational activities:3  8b) Working or doing household chores:2  8c) Visiting family or friends: 2    5 meter walk test  1) __7.05____ s/5m  2) __7.26____ s/5m  3) __7.92____ s/5m  AVG:_7.41______     Strength   1) _8_____ kg  2) _10_____ kg  3) _12_____ kg  AVG:__10____    KHALIL ADLs  Patient independently preforms...   - Bathing: Yes   - Dressing: Yes  - Toileting: Yes   - Transferring: Yes   - Continence: Yes  - Feeding: Yes   Total Score: _6_/6    Living Situation  Patient lives: alone  Mobility Aids   Patient uses: cane    FRAILTY SCORE: _2_/ 4

## 2023-09-14 ENCOUNTER — APPOINTMENT (OUTPATIENT)
Dept: ADMISSIONS | Facility: MEDICAL CENTER | Age: 88
DRG: 267 | End: 2023-09-14
Attending: INTERNAL MEDICINE
Payer: MEDICARE

## 2023-09-15 ENCOUNTER — TELEPHONE (OUTPATIENT)
Dept: CARDIOLOGY | Facility: MEDICAL CENTER | Age: 88
End: 2023-09-15
Payer: MEDICARE

## 2023-09-15 NOTE — TELEPHONE ENCOUNTER
BE    Caller: Marry Mathur     Topic/issue: Patient called regarding scheduling her transcatheter aortic valve replacement and would like a call back. Please advise.     Callback Number [phone]    Thank you,     Diana REDMOND

## 2023-09-15 NOTE — TELEPHONE ENCOUNTER
Called patient back. She thought she received a [employer] message prompting her to call and schedule her TAVR. Advised we would call her next week after valve conference to notify her of the plan for TAVR 9/25. She also states she plans on giving our phone number to her son who would like to ask some questions. Provided patient my direct number, in addition to Elizabeth's direct number she already has, to provide to her son. Patient appreciative of the phone call and information.

## 2023-09-18 NOTE — TELEPHONE ENCOUNTER
"Received another phone call from patient. She states she is still receiving automated phone calls to call the office and schedule \"unknown\" appointments. Advised they may just be automated messages to confirm upcoming appointments. She is also confused with some orders in [employer], but she didn't write down the orders. She will review and call me back if needed.     Reassured patient there's nothing further she needs to do on our end. Confirmed preadmit appt scheduled 9/20 and patient verbalizes understanding.   "

## 2023-09-19 ENCOUNTER — DOCUMENTATION (OUTPATIENT)
Dept: CARDIOLOGY | Facility: MEDICAL CENTER | Age: 88
End: 2023-09-19
Payer: MEDICARE

## 2023-09-19 NOTE — PROGRESS NOTES
Garza TAVR Review:    Consider a 23 or 26 S3UR from a left femoral approach.  Suboptimal CT quality - please confirm all measurements  Right CFA bifurcation is very high, moderate iliac tortuosity bilaterally  L11, Cau8

## 2023-09-20 ENCOUNTER — TELEPHONE (OUTPATIENT)
Dept: CARDIOLOGY | Facility: MEDICAL CENTER | Age: 88
End: 2023-09-20
Payer: MEDICARE

## 2023-09-20 ENCOUNTER — PRE-ADMISSION TESTING (OUTPATIENT)
Dept: ADMISSIONS | Facility: MEDICAL CENTER | Age: 88
DRG: 267 | End: 2023-09-20
Attending: INTERNAL MEDICINE
Payer: MEDICARE

## 2023-09-20 NOTE — TELEPHONE ENCOUNTER
Valve Conference Plan of Care: 9/20/2023 for TAVR 9/25/2023           TAVR Candidate: yes  Access: left femoral  Valve Size: 23mm vs 26mm  General Anesthesia or MAC: GA with VINAY, RIJ temp PM  Unit: IMCU  Incidental findings to be discussed with PCP: A 3.1 cm fat attenuation lesion in the left adnexa with rim calcifications, likely a dermoid cyst. Moderate degenerative and postsurgical change of the lumbar spine.   Clearance needed prior to procedure: no    Check in time of 0530 9/25/2023.     Pre-op appointment completed: scheduled today    NPO after midnight. Hold all OTC vitamins/supplements x7 days, hold Xarelto x48hrs, hold Losartan x24hrs.

## 2023-09-21 ENCOUNTER — TELEPHONE (OUTPATIENT)
Dept: CARDIOLOGY | Facility: MEDICAL CENTER | Age: 88
End: 2023-09-21
Payer: MEDICARE

## 2023-09-21 ENCOUNTER — PRE-ADMISSION TESTING (OUTPATIENT)
Dept: ADMISSIONS | Facility: MEDICAL CENTER | Age: 88
DRG: 267 | End: 2023-09-21
Attending: INTERNAL MEDICINE
Payer: MEDICARE

## 2023-09-21 DIAGNOSIS — I35.0 NONRHEUMATIC AORTIC (VALVE) STENOSIS: ICD-10-CM

## 2023-09-21 DIAGNOSIS — Z01.810 PRE-OPERATIVE CARDIOVASCULAR EXAMINATION: ICD-10-CM

## 2023-09-21 DIAGNOSIS — Z01.812 PRE-OPERATIVE LABORATORY EXAMINATION: ICD-10-CM

## 2023-09-21 LAB
ABO GROUP BLD: ABNORMAL
ALBUMIN SERPL BCP-MCNC: 4.1 G/DL (ref 3.2–4.9)
ALBUMIN/GLOB SERPL: 1.5 G/DL
ALP SERPL-CCNC: 88 U/L (ref 30–99)
ALT SERPL-CCNC: 27 U/L (ref 2–50)
ANION GAP SERPL CALC-SCNC: 10 MMOL/L (ref 7–16)
APTT PPP: 35.8 SEC (ref 24.7–36)
AST SERPL-CCNC: 27 U/L (ref 12–45)
BASOPHILS # BLD AUTO: 0.6 % (ref 0–1.8)
BASOPHILS # BLD: 0.04 K/UL (ref 0–0.12)
BILIRUB SERPL-MCNC: 0.5 MG/DL (ref 0.1–1.5)
BLD GP AB SCN SERPL QL: ABNORMAL
BUN SERPL-MCNC: 21 MG/DL (ref 8–22)
CALCIUM ALBUM COR SERPL-MCNC: 9.2 MG/DL (ref 8.5–10.5)
CALCIUM SERPL-MCNC: 9.3 MG/DL (ref 8.5–10.5)
CHLORIDE SERPL-SCNC: 99 MMOL/L (ref 96–112)
CO2 SERPL-SCNC: 25 MMOL/L (ref 20–33)
CREAT SERPL-MCNC: 0.92 MG/DL (ref 0.5–1.4)
EOSINOPHIL # BLD AUTO: 0.15 K/UL (ref 0–0.51)
EOSINOPHIL NFR BLD: 2.2 % (ref 0–6.9)
ERYTHROCYTE [DISTWIDTH] IN BLOOD BY AUTOMATED COUNT: 47.2 FL (ref 35.9–50)
GFR SERPLBLD CREATININE-BSD FMLA CKD-EPI: 60 ML/MIN/1.73 M 2
GLOBULIN SER CALC-MCNC: 2.7 G/DL (ref 1.9–3.5)
GLUCOSE SERPL-MCNC: 119 MG/DL (ref 65–99)
HCT VFR BLD AUTO: 37.9 % (ref 37–47)
HGB BLD-MCNC: 12.6 G/DL (ref 12–16)
IMM GRANULOCYTES # BLD AUTO: 0.02 K/UL (ref 0–0.11)
IMM GRANULOCYTES NFR BLD AUTO: 0.3 % (ref 0–0.9)
INR PPP: 1.4 (ref 0.87–1.13)
LYMPHOCYTES # BLD AUTO: 0.97 K/UL (ref 1–4.8)
LYMPHOCYTES NFR BLD: 14.1 % (ref 22–41)
MCH RBC QN AUTO: 30.9 PG (ref 27–33)
MCHC RBC AUTO-ENTMCNC: 33.2 G/DL (ref 32.2–35.5)
MCV RBC AUTO: 92.9 FL (ref 81.4–97.8)
MONOCYTES # BLD AUTO: 0.7 K/UL (ref 0–0.85)
MONOCYTES NFR BLD AUTO: 10.1 % (ref 0–13.4)
NEUTROPHILS # BLD AUTO: 5.02 K/UL (ref 1.82–7.42)
NEUTROPHILS NFR BLD: 72.7 % (ref 44–72)
NRBC # BLD AUTO: 0 K/UL
NRBC BLD-RTO: 0 /100 WBC (ref 0–0.2)
NT-PROBNP SERPL IA-MCNC: 1304 PG/ML (ref 0–125)
PLATELET # BLD AUTO: 185 K/UL (ref 164–446)
PMV BLD AUTO: 11.3 FL (ref 9–12.9)
POTASSIUM SERPL-SCNC: 4.6 MMOL/L (ref 3.6–5.5)
PROT SERPL-MCNC: 6.8 G/DL (ref 6–8.2)
PROTHROMBIN TIME: 17.3 SEC (ref 12–14.6)
RBC # BLD AUTO: 4.08 M/UL (ref 4.2–5.4)
RH BLD: ABNORMAL
SODIUM SERPL-SCNC: 134 MMOL/L (ref 135–145)
WBC # BLD AUTO: 6.9 K/UL (ref 4.8–10.8)

## 2023-09-21 PROCEDURE — 36415 COLL VENOUS BLD VENIPUNCTURE: CPT

## 2023-09-21 PROCEDURE — 86900 BLOOD TYPING SEROLOGIC ABO: CPT

## 2023-09-21 PROCEDURE — 85610 PROTHROMBIN TIME: CPT

## 2023-09-21 PROCEDURE — 83880 ASSAY OF NATRIURETIC PEPTIDE: CPT

## 2023-09-21 PROCEDURE — 86901 BLOOD TYPING SEROLOGIC RH(D): CPT

## 2023-09-21 PROCEDURE — 80053 COMPREHEN METABOLIC PANEL: CPT

## 2023-09-21 PROCEDURE — 85025 COMPLETE CBC W/AUTO DIFF WBC: CPT

## 2023-09-21 PROCEDURE — 85730 THROMBOPLASTIN TIME PARTIAL: CPT

## 2023-09-21 PROCEDURE — 86850 RBC ANTIBODY SCREEN: CPT

## 2023-09-21 NOTE — TELEPHONE ENCOUNTER
Thank you for the opportunity to participate in your patient's care.     Your patient is scheduled for transcatheter aortic valve replacement (TAVR) on Monday, September 25, 2023.    Sincerely,    [facility]    CELIA San, RN, Structural Heart Program Nurse Coordinator ([phone])  CELIA Garcia, RN, Structural Heart Program Nurse Coordinator ([phone])

## 2023-09-25 ENCOUNTER — APPOINTMENT (OUTPATIENT)
Dept: CARDIOLOGY | Facility: MEDICAL CENTER | Age: 88
DRG: 267 | End: 2023-09-25
Attending: INTERNAL MEDICINE
Payer: MEDICARE

## 2023-09-25 ENCOUNTER — APPOINTMENT (OUTPATIENT)
Dept: RADIOLOGY | Facility: MEDICAL CENTER | Age: 88
DRG: 267 | End: 2023-09-25
Payer: MEDICARE

## 2023-09-25 ENCOUNTER — ANESTHESIA (OUTPATIENT)
Dept: SURGERY | Facility: MEDICAL CENTER | Age: 88
DRG: 267 | End: 2023-09-25
Payer: MEDICARE

## 2023-09-25 ENCOUNTER — ANESTHESIA EVENT (OUTPATIENT)
Dept: SURGERY | Facility: MEDICAL CENTER | Age: 88
DRG: 267 | End: 2023-09-25
Payer: MEDICARE

## 2023-09-25 ENCOUNTER — HOSPITAL ENCOUNTER (INPATIENT)
Facility: MEDICAL CENTER | Age: 88
LOS: 1 days | DRG: 267 | End: 2023-09-26
Attending: INTERNAL MEDICINE | Admitting: INTERNAL MEDICINE
Payer: MEDICARE

## 2023-09-25 DIAGNOSIS — D68.59 PROTEIN S DEFICIENCY (HCC): ICD-10-CM

## 2023-09-25 DIAGNOSIS — I45.10 RIGHT BUNDLE BRANCH BLOCK: ICD-10-CM

## 2023-09-25 DIAGNOSIS — Z95.2 S/P TAVR (TRANSCATHETER AORTIC VALVE REPLACEMENT): ICD-10-CM

## 2023-09-25 DIAGNOSIS — Z86.718 HISTORY OF RECURRENT DEEP VEIN THROMBOSIS (DVT): ICD-10-CM

## 2023-09-25 LAB
ACT BLD: 269 SEC (ref 74–137)
ALBUMIN SERPL BCP-MCNC: 3.4 G/DL (ref 3.2–4.9)
ALBUMIN/GLOB SERPL: 1.1 G/DL
ALP SERPL-CCNC: 70 U/L (ref 30–99)
ALT SERPL-CCNC: 17 U/L (ref 2–50)
ANION GAP SERPL CALC-SCNC: 12 MMOL/L (ref 7–16)
AST SERPL-CCNC: 19 U/L (ref 12–45)
BILIRUB SERPL-MCNC: 0.6 MG/DL (ref 0.1–1.5)
BUN SERPL-MCNC: 21 MG/DL (ref 8–22)
CALCIUM ALBUM COR SERPL-MCNC: 8.9 MG/DL (ref 8.5–10.5)
CALCIUM SERPL-MCNC: 8.4 MG/DL (ref 8.5–10.5)
CHLORIDE SERPL-SCNC: 98 MMOL/L (ref 96–112)
CO2 SERPL-SCNC: 22 MMOL/L (ref 20–33)
CREAT SERPL-MCNC: 0.58 MG/DL (ref 0.5–1.4)
EKG IMPRESSION: NORMAL
ERYTHROCYTE [DISTWIDTH] IN BLOOD BY AUTOMATED COUNT: 46 FL (ref 35.9–50)
GFR SERPLBLD CREATININE-BSD FMLA CKD-EPI: 87 ML/MIN/1.73 M 2
GLOBULIN SER CALC-MCNC: 3 G/DL (ref 1.9–3.5)
GLUCOSE SERPL-MCNC: 130 MG/DL (ref 65–99)
HCT VFR BLD AUTO: 35.2 % (ref 37–47)
HGB BLD-MCNC: 11.8 G/DL (ref 12–16)
INR PPP: 1.16 (ref 0.87–1.13)
MCH RBC QN AUTO: 31.1 PG (ref 27–33)
MCHC RBC AUTO-ENTMCNC: 33.5 G/DL (ref 32.2–35.5)
MCV RBC AUTO: 92.6 FL (ref 81.4–97.8)
NT-PROBNP SERPL IA-MCNC: 1026 PG/ML (ref 0–125)
PLATELET # BLD AUTO: 151 K/UL (ref 164–446)
PMV BLD AUTO: 10.7 FL (ref 9–12.9)
POTASSIUM SERPL-SCNC: 3.7 MMOL/L (ref 3.6–5.5)
PROT SERPL-MCNC: 6.4 G/DL (ref 6–8.2)
PROTHROMBIN TIME: 14.9 SEC (ref 12–14.6)
RBC # BLD AUTO: 3.8 M/UL (ref 4.2–5.4)
SODIUM SERPL-SCNC: 132 MMOL/L (ref 135–145)
WBC # BLD AUTO: 12.6 K/UL (ref 4.8–10.8)

## 2023-09-25 PROCEDURE — 700111 HCHG RX REV CODE 636 W/ 250 OVERRIDE (IP)

## 2023-09-25 PROCEDURE — 5A1223Z PERFORMANCE OF CARDIAC PACING, CONTINUOUS: ICD-10-PCS | Performed by: INTERNAL MEDICINE

## 2023-09-25 PROCEDURE — 700111 HCHG RX REV CODE 636 W/ 250 OVERRIDE (IP): Performed by: INTERNAL MEDICINE

## 2023-09-25 PROCEDURE — C1894 INTRO/SHEATH, NON-LASER: HCPCS | Performed by: INTERNAL MEDICINE

## 2023-09-25 PROCEDURE — 160009 HCHG ANES TIME/MIN: Performed by: INTERNAL MEDICINE

## 2023-09-25 PROCEDURE — 85027 COMPLETE CBC AUTOMATED: CPT

## 2023-09-25 PROCEDURE — A9270 NON-COVERED ITEM OR SERVICE: HCPCS

## 2023-09-25 PROCEDURE — 93010 ELECTROCARDIOGRAM REPORT: CPT | Performed by: INTERNAL MEDICINE

## 2023-09-25 PROCEDURE — C1883 ADAPT/EXT, PACING/NEURO LEAD: HCPCS | Performed by: INTERNAL MEDICINE

## 2023-09-25 PROCEDURE — 700101 HCHG RX REV CODE 250: Performed by: ANESTHESIOLOGY

## 2023-09-25 PROCEDURE — 700111 HCHG RX REV CODE 636 W/ 250 OVERRIDE (IP): Mod: JZ | Performed by: ANESTHESIOLOGY

## 2023-09-25 PROCEDURE — 770000 HCHG ROOM/CARE - INTERMEDIATE ICU *

## 2023-09-25 PROCEDURE — 700101 HCHG RX REV CODE 250: Performed by: INTERNAL MEDICINE

## 2023-09-25 PROCEDURE — 93355 ECHO TRANSESOPHAGEAL (TEE): CPT

## 2023-09-25 PROCEDURE — 71045 X-RAY EXAM CHEST 1 VIEW: CPT

## 2023-09-25 PROCEDURE — 700105 HCHG RX REV CODE 258: Performed by: INTERNAL MEDICINE

## 2023-09-25 PROCEDURE — 700102 HCHG RX REV CODE 250 W/ 637 OVERRIDE(OP): Performed by: INTERNAL MEDICINE

## 2023-09-25 PROCEDURE — 85347 COAGULATION TIME ACTIVATED: CPT

## 2023-09-25 PROCEDURE — 80053 COMPREHEN METABOLIC PANEL: CPT

## 2023-09-25 PROCEDURE — 700105 HCHG RX REV CODE 258: Performed by: ANESTHESIOLOGY

## 2023-09-25 PROCEDURE — 33361 REPLACE AORTIC VALVE PERQ: CPT | Mod: 62,Q0 | Performed by: THORACIC SURGERY (CARDIOTHORACIC VASCULAR SURGERY)

## 2023-09-25 PROCEDURE — 700102 HCHG RX REV CODE 250 W/ 637 OVERRIDE(OP)

## 2023-09-25 PROCEDURE — B245ZZ4 ULTRASONOGRAPHY OF LEFT HEART, TRANSESOPHAGEAL: ICD-10-PCS | Performed by: INTERNAL MEDICINE

## 2023-09-25 PROCEDURE — 02RF38Z REPLACEMENT OF AORTIC VALVE WITH ZOOPLASTIC TISSUE, PERCUTANEOUS APPROACH: ICD-10-PCS | Performed by: THORACIC SURGERY (CARDIOTHORACIC VASCULAR SURGERY)

## 2023-09-25 PROCEDURE — 503001 HCHG PERFUSION: Performed by: INTERNAL MEDICINE

## 2023-09-25 PROCEDURE — 85610 PROTHROMBIN TIME: CPT

## 2023-09-25 PROCEDURE — C1769 GUIDE WIRE: HCPCS | Performed by: INTERNAL MEDICINE

## 2023-09-25 PROCEDURE — 110372 HCHG SHELL REV 278: Performed by: INTERNAL MEDICINE

## 2023-09-25 PROCEDURE — 83880 ASSAY OF NATRIURETIC PEPTIDE: CPT

## 2023-09-25 PROCEDURE — 160048 HCHG OR STATISTICAL LEVEL 1-5: Performed by: INTERNAL MEDICINE

## 2023-09-25 PROCEDURE — 160031 HCHG SURGERY MINUTES - 1ST 30 MINS LEVEL 5: Performed by: INTERNAL MEDICINE

## 2023-09-25 PROCEDURE — A9270 NON-COVERED ITEM OR SERVICE: HCPCS | Performed by: INTERNAL MEDICINE

## 2023-09-25 PROCEDURE — 700105 HCHG RX REV CODE 258

## 2023-09-25 PROCEDURE — C1887 CATHETER, GUIDING: HCPCS | Performed by: INTERNAL MEDICINE

## 2023-09-25 PROCEDURE — C1760 CLOSURE DEV, VASC: HCPCS | Performed by: INTERNAL MEDICINE

## 2023-09-25 PROCEDURE — 33361 REPLACE AORTIC VALVE PERQ: CPT | Mod: 62,Q0 | Performed by: INTERNAL MEDICINE

## 2023-09-25 PROCEDURE — 160042 HCHG SURGERY MINUTES - EA ADDL 1 MIN LEVEL 5: Performed by: INTERNAL MEDICINE

## 2023-09-25 PROCEDURE — 93005 ELECTROCARDIOGRAM TRACING: CPT

## 2023-09-25 DEVICE — IMPLANTABLE DEVICE: Type: IMPLANTABLE DEVICE | Site: HEART | Status: FUNCTIONAL

## 2023-09-25 RX ORDER — ACETAMINOPHEN 325 MG/1
650 TABLET ORAL EVERY 6 HOURS PRN
Status: DISCONTINUED | OUTPATIENT
Start: 2023-09-25 | End: 2023-09-26 | Stop reason: HOSPADM

## 2023-09-25 RX ORDER — SODIUM CHLORIDE, SODIUM LACTATE, POTASSIUM CHLORIDE, CALCIUM CHLORIDE 600; 310; 30; 20 MG/100ML; MG/100ML; MG/100ML; MG/100ML
INJECTION, SOLUTION INTRAVENOUS
Status: DISCONTINUED | OUTPATIENT
Start: 2023-09-25 | End: 2023-09-25 | Stop reason: SURG

## 2023-09-25 RX ORDER — PHENYLEPHRINE HYDROCHLORIDE 10 MG/ML
INJECTION, SOLUTION INTRAMUSCULAR; INTRAVENOUS; SUBCUTANEOUS PRN
Status: DISCONTINUED | OUTPATIENT
Start: 2023-09-25 | End: 2023-09-25 | Stop reason: SURG

## 2023-09-25 RX ORDER — AMLODIPINE BESYLATE 2.5 MG/1
2.5 TABLET ORAL
Status: DISCONTINUED | OUTPATIENT
Start: 2023-09-25 | End: 2023-09-26 | Stop reason: HOSPADM

## 2023-09-25 RX ORDER — WARFARIN SODIUM 5 MG/1
5 TABLET ORAL
Status: COMPLETED | OUTPATIENT
Start: 2023-09-25 | End: 2023-09-25

## 2023-09-25 RX ORDER — OMEPRAZOLE 20 MG/1
20 CAPSULE, DELAYED RELEASE ORAL EVERY MORNING
Status: DISCONTINUED | OUTPATIENT
Start: 2023-09-25 | End: 2023-09-26 | Stop reason: HOSPADM

## 2023-09-25 RX ORDER — ONDANSETRON 2 MG/ML
INJECTION INTRAMUSCULAR; INTRAVENOUS PRN
Status: DISCONTINUED | OUTPATIENT
Start: 2023-09-25 | End: 2023-09-25 | Stop reason: SURG

## 2023-09-25 RX ORDER — SODIUM CHLORIDE 9 MG/ML
INJECTION, SOLUTION INTRAVENOUS CONTINUOUS
Status: ACTIVE | OUTPATIENT
Start: 2023-09-25 | End: 2023-09-25

## 2023-09-25 RX ORDER — HYDRALAZINE HYDROCHLORIDE 20 MG/ML
10 INJECTION INTRAMUSCULAR; INTRAVENOUS
Status: DISCONTINUED | OUTPATIENT
Start: 2023-09-25 | End: 2023-09-26 | Stop reason: HOSPADM

## 2023-09-25 RX ORDER — LIDOCAINE HYDROCHLORIDE 20 MG/ML
INJECTION, SOLUTION EPIDURAL; INFILTRATION; INTRACAUDAL; PERINEURAL PRN
Status: DISCONTINUED | OUTPATIENT
Start: 2023-09-25 | End: 2023-09-25 | Stop reason: SURG

## 2023-09-25 RX ORDER — DEXAMETHASONE SODIUM PHOSPHATE 4 MG/ML
INJECTION, SOLUTION INTRA-ARTICULAR; INTRALESIONAL; INTRAMUSCULAR; INTRAVENOUS; SOFT TISSUE PRN
Status: DISCONTINUED | OUTPATIENT
Start: 2023-09-25 | End: 2023-09-25 | Stop reason: SURG

## 2023-09-25 RX ORDER — DIPHENHYDRAMINE HCL 25 MG
25 TABLET ORAL NIGHTLY PRN
Status: DISCONTINUED | OUTPATIENT
Start: 2023-09-25 | End: 2023-09-26 | Stop reason: HOSPADM

## 2023-09-25 RX ORDER — VERAPAMIL HYDROCHLORIDE 2.5 MG/ML
INJECTION, SOLUTION INTRAVENOUS
Status: DISCONTINUED | OUTPATIENT
Start: 2023-09-25 | End: 2023-09-25 | Stop reason: HOSPADM

## 2023-09-25 RX ORDER — AMOXICILLIN 250 MG
2 CAPSULE ORAL 2 TIMES DAILY
Status: DISCONTINUED | OUTPATIENT
Start: 2023-09-25 | End: 2023-09-26 | Stop reason: HOSPADM

## 2023-09-25 RX ORDER — BISACODYL 10 MG
10 SUPPOSITORY, RECTAL RECTAL
Status: DISCONTINUED | OUTPATIENT
Start: 2023-09-25 | End: 2023-09-26 | Stop reason: HOSPADM

## 2023-09-25 RX ORDER — LIDOCAINE HYDROCHLORIDE 20 MG/ML
INJECTION, SOLUTION INFILTRATION; PERINEURAL
Status: DISCONTINUED | OUTPATIENT
Start: 2023-09-25 | End: 2023-09-25 | Stop reason: HOSPADM

## 2023-09-25 RX ORDER — HEPARIN SODIUM,PORCINE 1000/ML
VIAL (ML) INJECTION PRN
Status: DISCONTINUED | OUTPATIENT
Start: 2023-09-25 | End: 2023-09-25 | Stop reason: SURG

## 2023-09-25 RX ORDER — LOSARTAN POTASSIUM 50 MG/1
50 TABLET ORAL
Status: DISCONTINUED | OUTPATIENT
Start: 2023-09-25 | End: 2023-09-26 | Stop reason: HOSPADM

## 2023-09-25 RX ORDER — SODIUM CHLORIDE, SODIUM LACTATE, POTASSIUM CHLORIDE, CALCIUM CHLORIDE 600; 310; 30; 20 MG/100ML; MG/100ML; MG/100ML; MG/100ML
INJECTION, SOLUTION INTRAVENOUS CONTINUOUS
Status: ACTIVE | OUTPATIENT
Start: 2023-09-25 | End: 2023-09-25

## 2023-09-25 RX ORDER — PROTAMINE SULFATE 10 MG/ML
INJECTION, SOLUTION INTRAVENOUS PRN
Status: DISCONTINUED | OUTPATIENT
Start: 2023-09-25 | End: 2023-09-25 | Stop reason: SURG

## 2023-09-25 RX ORDER — ONDANSETRON 2 MG/ML
4 INJECTION INTRAMUSCULAR; INTRAVENOUS EVERY 4 HOURS PRN
Status: DISCONTINUED | OUTPATIENT
Start: 2023-09-25 | End: 2023-09-26 | Stop reason: HOSPADM

## 2023-09-25 RX ORDER — DIPHENHYDRAMINE HYDROCHLORIDE 50 MG/ML
25 INJECTION INTRAMUSCULAR; INTRAVENOUS EVERY 6 HOURS PRN
Status: DISCONTINUED | OUTPATIENT
Start: 2023-09-25 | End: 2023-09-26 | Stop reason: HOSPADM

## 2023-09-25 RX ORDER — POLYETHYLENE GLYCOL 3350 17 G/17G
1 POWDER, FOR SOLUTION ORAL
Status: DISCONTINUED | OUTPATIENT
Start: 2023-09-25 | End: 2023-09-26 | Stop reason: HOSPADM

## 2023-09-25 RX ORDER — ATORVASTATIN CALCIUM 20 MG/1
20 TABLET, FILM COATED ORAL EVERY EVENING
Status: DISCONTINUED | OUTPATIENT
Start: 2023-09-25 | End: 2023-09-26 | Stop reason: HOSPADM

## 2023-09-25 RX ORDER — CEFAZOLIN SODIUM 1 G/3ML
INJECTION, POWDER, FOR SOLUTION INTRAMUSCULAR; INTRAVENOUS PRN
Status: DISCONTINUED | OUTPATIENT
Start: 2023-09-25 | End: 2023-09-25 | Stop reason: SURG

## 2023-09-25 RX ORDER — ASPIRIN 81 MG/1
81 TABLET ORAL DAILY
Status: DISCONTINUED | OUTPATIENT
Start: 2023-09-25 | End: 2023-09-26 | Stop reason: HOSPADM

## 2023-09-25 RX ORDER — GABAPENTIN 300 MG/1
300 CAPSULE ORAL 3 TIMES DAILY
Status: DISCONTINUED | OUTPATIENT
Start: 2023-09-25 | End: 2023-09-26 | Stop reason: HOSPADM

## 2023-09-25 RX ORDER — BUPIVACAINE HYDROCHLORIDE 2.5 MG/ML
INJECTION, SOLUTION EPIDURAL; INFILTRATION; INTRACAUDAL
Status: DISCONTINUED | OUTPATIENT
Start: 2023-09-25 | End: 2023-09-25 | Stop reason: HOSPADM

## 2023-09-25 RX ORDER — LIDOCAINE 50 MG/G
1 PATCH TOPICAL EVERY 24 HOURS
Status: DISCONTINUED | OUTPATIENT
Start: 2023-09-26 | End: 2023-09-26 | Stop reason: HOSPADM

## 2023-09-25 RX ADMIN — HYDRALAZINE HYDROCHLORIDE 10 MG: 20 INJECTION, SOLUTION INTRAMUSCULAR; INTRAVENOUS at 09:39

## 2023-09-25 RX ADMIN — SUGAMMADEX 200 MG: 100 INJECTION, SOLUTION INTRAVENOUS at 08:32

## 2023-09-25 RX ADMIN — FENTANYL CITRATE 50 MCG: 50 INJECTION, SOLUTION INTRAMUSCULAR; INTRAVENOUS at 08:24

## 2023-09-25 RX ADMIN — PROTAMINE SULFATE 50 MG: 10 INJECTION, SOLUTION INTRAVENOUS at 08:24

## 2023-09-25 RX ADMIN — LIDOCAINE HYDROCHLORIDE 100 MG: 20 INJECTION, SOLUTION EPIDURAL; INFILTRATION; INTRACAUDAL at 07:34

## 2023-09-25 RX ADMIN — GABAPENTIN 300 MG: 300 CAPSULE ORAL at 17:44

## 2023-09-25 RX ADMIN — CEFAZOLIN 2 G: 1 INJECTION, POWDER, FOR SOLUTION INTRAMUSCULAR; INTRAVENOUS at 07:34

## 2023-09-25 RX ADMIN — ONDANSETRON 4 MG: 2 INJECTION INTRAMUSCULAR; INTRAVENOUS at 08:24

## 2023-09-25 RX ADMIN — ASPIRIN 81 MG: 81 TABLET, COATED ORAL at 09:40

## 2023-09-25 RX ADMIN — SODIUM CHLORIDE, POTASSIUM CHLORIDE, SODIUM LACTATE AND CALCIUM CHLORIDE: 600; 310; 30; 20 INJECTION, SOLUTION INTRAVENOUS at 07:26

## 2023-09-25 RX ADMIN — FENTANYL CITRATE 100 MCG: 50 INJECTION, SOLUTION INTRAMUSCULAR; INTRAVENOUS at 08:33

## 2023-09-25 RX ADMIN — ROCURONIUM BROMIDE 50 MG: 10 INJECTION, SOLUTION INTRAVENOUS at 07:34

## 2023-09-25 RX ADMIN — WARFARIN SODIUM 5 MG: 5 TABLET ORAL at 17:44

## 2023-09-25 RX ADMIN — OMEPRAZOLE 20 MG: 20 CAPSULE, DELAYED RELEASE ORAL at 09:39

## 2023-09-25 RX ADMIN — SODIUM CHLORIDE: 9 INJECTION, SOLUTION INTRAVENOUS at 09:39

## 2023-09-25 RX ADMIN — HEPARIN SODIUM 7000 UNITS: 1000 INJECTION, SOLUTION INTRAVENOUS; SUBCUTANEOUS at 08:11

## 2023-09-25 RX ADMIN — GABAPENTIN 300 MG: 300 CAPSULE ORAL at 13:13

## 2023-09-25 RX ADMIN — PHENYLEPHRINE HYDROCHLORIDE 100 MCG: 10 INJECTION INTRAVENOUS at 08:19

## 2023-09-25 RX ADMIN — DEXAMETHASONE SODIUM PHOSPHATE 4 MG: 4 INJECTION INTRA-ARTICULAR; INTRALESIONAL; INTRAMUSCULAR; INTRAVENOUS; SOFT TISSUE at 07:34

## 2023-09-25 RX ADMIN — LOSARTAN POTASSIUM 50 MG: 50 TABLET, FILM COATED ORAL at 21:41

## 2023-09-25 RX ADMIN — PROPOFOL 200 MG: 10 INJECTION, EMULSION INTRAVENOUS at 07:34

## 2023-09-25 RX ADMIN — ATORVASTATIN CALCIUM 20 MG: 20 TABLET, FILM COATED ORAL at 17:44

## 2023-09-25 ASSESSMENT — PAIN DESCRIPTION - PAIN TYPE
TYPE: ACUTE PAIN
TYPE: ACUTE PAIN
TYPE: ACUTE PAIN;SURGICAL PAIN
TYPE: ACUTE PAIN
TYPE: SURGICAL PAIN;ACUTE PAIN

## 2023-09-25 ASSESSMENT — LIFESTYLE VARIABLES
ALCOHOL_USE: NO
EVER HAD A DRINK FIRST THING IN THE MORNING TO STEADY YOUR NERVES TO GET RID OF A HANGOVER: NO
HOW MANY TIMES IN THE PAST YEAR HAVE YOU HAD 5 OR MORE DRINKS IN A DAY: 0
TOTAL SCORE: 0
TOTAL SCORE: 0
CONSUMPTION TOTAL: NEGATIVE
TOTAL SCORE: 0
ON A TYPICAL DAY WHEN YOU DRINK ALCOHOL HOW MANY DRINKS DO YOU HAVE: 0
DOES PATIENT WANT TO STOP DRINKING: NO
EVER FELT BAD OR GUILTY ABOUT YOUR DRINKING: NO
HAVE YOU EVER FELT YOU SHOULD CUT DOWN ON YOUR DRINKING: NO
AVERAGE NUMBER OF DAYS PER WEEK YOU HAVE A DRINK CONTAINING ALCOHOL: 0
HAVE PEOPLE ANNOYED YOU BY CRITICIZING YOUR DRINKING: NO

## 2023-09-25 ASSESSMENT — COGNITIVE AND FUNCTIONAL STATUS - GENERAL
WALKING IN HOSPITAL ROOM: A LITTLE
MOVING FROM LYING ON BACK TO SITTING ON SIDE OF FLAT BED: A LITTLE
TOILETING: A LITTLE
DRESSING REGULAR LOWER BODY CLOTHING: A LITTLE
MOBILITY SCORE: 17
SUGGESTED CMS G CODE MODIFIER DAILY ACTIVITY: CJ
CLIMB 3 TO 5 STEPS WITH RAILING: A LOT
SUGGESTED CMS G CODE MODIFIER MOBILITY: CK
DAILY ACTIVITIY SCORE: 22
MOVING TO AND FROM BED TO CHAIR: A LITTLE
TURNING FROM BACK TO SIDE WHILE IN FLAT BAD: A LITTLE
STANDING UP FROM CHAIR USING ARMS: A LITTLE

## 2023-09-25 ASSESSMENT — PATIENT HEALTH QUESTIONNAIRE - PHQ9
SUM OF ALL RESPONSES TO PHQ9 QUESTIONS 1 AND 2: 0
2. FEELING DOWN, DEPRESSED, IRRITABLE, OR HOPELESS: NOT AT ALL
1. LITTLE INTEREST OR PLEASURE IN DOING THINGS: NOT AT ALL
2. FEELING DOWN, DEPRESSED, IRRITABLE, OR HOPELESS: NOT AT ALL
1. LITTLE INTEREST OR PLEASURE IN DOING THINGS: NOT AT ALL
SUM OF ALL RESPONSES TO PHQ9 QUESTIONS 1 AND 2: 0

## 2023-09-25 ASSESSMENT — FIBROSIS 4 INDEX
FIB4 SCORE: 2.66
FIB4 SCORE: 2.44
FIB4 SCORE: 2.44

## 2023-09-25 NOTE — ANESTHESIA PROCEDURE NOTES
Airway    Date/Time: 9/25/2023 7:35 AM    Performed by: Agustin Currie M.D.  Authorized by: Agustin Currie M.D.    Location:  OR  Urgency:  Elective  Difficult Airway: No    Indications for Airway Management:  Anesthesia      Spontaneous Ventilation: absent    Sedation Level:  Deep  Preoxygenated: Yes    Patient Position:  Sniffing  Final Airway Type:  Endotracheal airway  Final Endotracheal Airway:  ETT  Cuffed: Yes    Technique Used for Successful ETT Placement:  Direct laryngoscopy  Devices/Methods Used in Placement:  Intubating stylet    Insertion Site:  Oral  Blade Type:  Lissa  Laryngoscope Blade/Videolaryngoscope Blade Size:  3  ETT Size (mm):  7.5  Measured from:  Teeth  ETT to Teeth (cm):  21  Placement Verified by: auscultation and capnometry    Cormack-Lehane Classification:  Grade IIa - partial view of glottis  Number of Attempts at Approach:  1

## 2023-09-25 NOTE — OR SURGEON
OPERATIVE REPORT    Operation date: 9/25/2023    Referring physician: Haseeb Serrano MD    PreOp Diagnosis: Severe aortic stenosis, right bundle branch block, recurrent DVT    PostOp Diagnosis: Severe aortic stenosis, right bundle branch block, recurrent DVT    Procedure(s):  TRANSCATHETER AORTIC VALVE REPLACEMENT (TAVR 26 mm S3 Ultra Resilia Garza bovine pericardial valve, -1 mL in balloon) and intraoperative transesophageal echocardiography    Surgeon(s):  DIONISIO Rubalcava M.D.    Anesthesiologist/Type of Anesthesia:  Anesthesiologist: Agustin Currie M.D./General    Surgical Staff:  Circulator: Ronan Azevedo R.N.; Dorothy Robertson R.N.  Perfusionist: Cesar Bustillos  Scrub Person: Aleksandra Hong  Radiology Technologist: Cesar Orta  Cath Lab Tech: Megan Jameson    Specimens removed if any: None    Estimated Blood Loss: Minimal    Findings: Aortic stenosis    Complications: None    Indications:  Ms. Mathur is an 88-year-old female with severe aortic stenosis.    Procedure:  The patient was brought to the operating room and placed on the operating room table in the supine position.  After successful induction of general anesthesia endotracheal intubation, the patient was prepped and draped regular sterile fashion.  A right internal jugular temporary transvenous pacemaker was placed in the usual fashion by anesthesia.  Ultrasound-guided right radial arterial and left femoral arterial access was obtained.  A 1 cm incision was made in the left groin and 2 Perclose sutures were deployed.  A 14 Upper sorbian eSheath was then placed in the left common femoral artery and its tip was positioned in the abdominal aorta.  The deployment angle was determined.  The aortic valve was crossed with a wire.  A deployment catheter with a 26 mm S3 Ultra Resilia Garza bovine pericardial valve at its tip was positioned across the aortic annulus.  The implantation balloon was inflated to a peak  pressure of nearly 7 radha.  One mL of volume remained in the balloon.  Wires and catheters were removed.  Intraoperative transesophageal echocardiography did not show any paravalvular or central leaks.  The left femoral eSheath was removed and the Perclose sutures were tightened down.  The remainder of the operation will be dictated by Dr. Serrano.  There were no apparent complications.  The patient tolerated the procedure well and left the operating room in stable condition.      9/25/2023 8:37 AM Jez Liao MD, FACS

## 2023-09-25 NOTE — PROGRESS NOTES
Inpatient Anticoagulation Service Note for 9/25/2023    Reason for Anticoagulation: Deep Vein Thrombosis      Hemoglobin Value: (Abnormal) 11.8  Hematocrit Value: (Abnormal) 35.2  Lab Platelet Value: (Abnormal) 151  Target INR: 2.0 to 3.0    INR from last 7 days       Date/Time INR Value    09/25/23 1025 (Abnormal) 1.16          Dose from last 7 days       Date/Time Dose (mg)    09/25/23 1244 5          Significant Interactions: Antiplatelet Medications  Bridge Therapy: No   Reversal Agent Administered: Not Applicable    A/P  Patient being transitioned from Xarelto to warfarin for history of DVT. Reviewed dosing history from when patient previously on warfarin (2.5 mg every Tu/Sat and 5 mg all other days). For this reason will give warfarin 5 mg tonight with INR ordered for tomorrow.    Education Material Provided?: No    Pharmacist suggested discharge dosing: TBD pending INR trends, perhaps warfarin 2.5 mg PO every Tu/Sat and 5 mg PO all other days.  Recommend a follow-up PT/INR within 48-72 hours of discharge.    Mora Sullivan, PharmD, BCPS, BCCCP

## 2023-09-25 NOTE — ANESTHESIA PROCEDURE NOTES
Central Venous Line    Performed by: Agustin Currie M.D.  Authorized by: Agustin Currie M.D.    Start Time:  9/25/2023 7:50 AM  End Time:  9/25/2023 8:00 AM  Patient Location:  OR  Indication: central venous access and hemodynamic monitoring        provider hand hygiene performed prior to central venous catheter insertion, all 5 sterile barriers used (gloves, gown, cap, mask, large sterile drape) during central venous catheter insertion and skin prep agent completely dried prior to procedure    Patient Position:  Trendelenburg  Laterality:  Left  Site:  Internal jugular  Prep:  Chlorhexidine  Catheter Size:  6 Fr  Catheter Length (cm):  10  Catheter Type:  Introducer  Number of Lumens:  Single lumen  target vein identified, needle advanced into vein and blood aspirated and guidewire advanced into vein    Seldinger Technique?: Yes    Ultrasound-Guided: ultrasound-guided  Image captured, interpreted and electronically stored.  Sterile Gel and Probe Cover Used for Ultrasound?: Yes    Intravenous Verification: verified by ultrasound, venous blood return and chest x-ray pending    all ports aspirated, all ports flushed easily, guidewire was removed intact, biopatch was applied, line was sutured in place and dressing was applied    Events: patient tolerated procedure well with no complications     Pacing wire placed at 32 cm.  Positive capture down to 0.2 mA.    R side attempted.  Could not advance wire.  Resorted to left side.

## 2023-09-25 NOTE — ANESTHESIA TIME REPORT
Anesthesia Start and Stop Event Times     Date Time Event    9/25/2023 0716 Ready for Procedure     0726 Anesthesia Start     0843 Anesthesia Stop        Responsible Staff  09/25/23    Name Role Begin End    Agustin Currie M.D. Anesth 0726 0843        Overtime Reason:  no overtime (within assigned shift)    Comments:

## 2023-09-25 NOTE — ANESTHESIA PROCEDURE NOTES
VINAY    Date/Time: 9/25/2023 8:00 AM    Performed by: Agustin Currie M.D.  Authorized by: Agustin Currie M.D.    Start Time:9/25/2023 8:00 AM  Preanesthetic Checklist: patient identified, IV checked, risks and benefits discussed, surgical consent, monitors and equipment checked, pre-op evaluation and timeout performed    Indication for VINAY: diagnostic   Patient Location: OR  Intubated: Yes  Bite Block: No  Heart Visualized: Yes  Insertion: atraumatic    **See FULL VINAY report in patient's chart via CV Syn[facility]**

## 2023-09-25 NOTE — OP REPORT
"TRANSCATHETER AORTIC VALVE REPLACEMENT REPORT    Referring Provider: Zane Van MD    INTERVENTIONAL CARDIOLOGIST: Haseeb Serrano MD  CARDIAC SURGEON: Jez Liao MD  ANESTHESIOLOGIST: Agustin Currie MD    ASSISTANT: None    IMPRESSIONS:  1. Successful implantation of a 26 Garza Ganesh S3 Ultra Resilia deployed at nominal volume -1 cc via the transfemoral approach  2. Normal LVEDP at 10 mmHg    Recommendations:  4 hour bedrest    Pre-procedure diagnosis: TAVR recommended by heart valve team. Stage D1 (symptomatic severe AS)  Post-procedure diagnosis: Same    Procedure performed  Pigtail placement/ascending aortography  Transvenous pacemaker  26 Garza S3 Ultra Resilia  Perclose closure    Procedure Description  1. Access:   A) Valve Sheath: Left femoral, 14F Garza eSheath. Fluoroscopic guidance was utilized for femoral access Dynamic ultrasound was utilized to gain access.  B) Temporary pacemaker: 6 [place] Right internal jugular.  Placed by anesthesia .  C) Pigtail catheter: 5/6 [place]ish right radial artery Micropuncture technique was utilized following local anesthesia with lidocaine.  A radial cocktail containing verapamil and saline was administered in the radial artery sheath    2. Procedure Description:  A TVP and pigtail catheter were placed and appropriate pacer function and implantation angle confirmed. The preclose was deployed followed by dilation of the tract with an 8F sheath. A standard 0.035\" J wire was used to deliver an catheter to the ascending aorta and then exchange for a 0.035\" Lunderquist wire. Over this wire the Garza E sheath advanced into the descending aorta.  The valve was crossed with the AL-1 and 0.035\" wire. The catheter was advanced into the LV apex and wire exchanged for a 0.035\" Amplatz Super Stiff wire.   Next a 26 mm Garza Ganesh S3 Ultra Resilia was deployed under rapid pacing with nominal volume-1 and 7 radha.  . Successful valve implantation confirmed by VINAY. A pigtail " catheter was used to perform left heart catheterization and LVEDP measured at 10 mmHg.  Protamine was administered and the Garza sheath was removed from the body after reinsertion of the dilator. The perclose sutures were tightened hemostasis was achieved. The pigtail catheter was removed. The TVP was secured in placed.    3. Hemostasis:   A) TAVR Sheath: Perclose by Preclose technique  B) TVP: Secured in place  C) Pigtail access: TR band    Technical Factors  1. Complications: None  2. Estimated Blood Loss: 50 cc  3. Specimens: None  4. Contrast Volume: 75 ml  5. Sedation: General Anesthesia  6. Echo: VINAY

## 2023-09-25 NOTE — ANESTHESIA PREPROCEDURE EVALUATION
f Case: [id number] Anesthesia Start Date/Time: 09/25/23 0726    Procedures:       TRANSCATHETER AORTIC VALVE REPLACEMENT (Bilateral: Groin)      ECHOCARDIOGRAM, TRANSESOPHAGEAL, INTRAOPERATIVE (Mouth)      INSERTION, TEMPORARY CARDIAC PACEMAKER (Right: Neck)    Anesthesia type: general    Pre-op diagnosis: SEVERE AORITC STENOSIS    Location: [facility] OR 19 / SURGERY [facility]    Surgeons: Haseeb Serrano M.D.          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    Pertinent diagnostic labs and testing reviewed    Informed Consent:    Anesthetic plan and risks discussed with patient.    Use of blood products discussed with: patient whom.

## 2023-09-25 NOTE — ANESTHESIA PROCEDURE NOTES
Arterial Line    Performed by: Agustin Currie M.D.  Authorized by: Agustin Currie M.D.    Start Time:  9/25/2023 7:38 AM  End Time:  9/25/2023 7:39 AM  Localization: ultrasound guidance  Image captured, interpreted and electronically stored.  Patient Location:  OR  Indication: continuous blood pressure monitoring and blood sampling needed        Catheter Size:  20 G  Seldinger Technique?: Yes    Laterality:  Left  Site:  Radial artery  Line Secured:  Antimicrobial disc, tape and transparent dressing  Events: patient tolerated procedure well with no complications

## 2023-09-25 NOTE — ANESTHESIA POSTPROCEDURE EVALUATION
Patient: Marry Mathur    Procedure Summary     Date: 09/25/23 Room / Location: [place] [id number] / SURGERY [facility]    Anesthesia Start: 0726 Anesthesia Stop: 0843    Procedures:       TRANSCATHETER AORTIC VALVE REPLACEMENT (Bilateral: Groin)      ECHOCARDIOGRAM, TRANSESOPHAGEAL, INTRAOPERATIVE (Mouth)      INSERTION, TEMPORARY CARDIAC PACEMAKER (Right: Neck) Diagnosis: (SEVERE AORITC STENOSIS)    Surgeons: Haseeb Serrano M.D. Responsible Provider: Agustin Currie M.D.    Anesthesia Type: general ASA Status: 4          Final Anesthesia Type: general  Last vitals  BP   Blood Pressure : 116/55, Arterial BP: 136/68    Temp   36.3 °C (97.3 °F)    Pulse   92   Resp   (!) 31    SpO2   94 %      Anesthesia Post Evaluation    Patient location during evaluation: PACU  Patient participation: complete - patient participated  Level of consciousness: awake and alert    Airway patency: patent  Anesthetic complications: no  Cardiovascular status: hemodynamically stable  Respiratory status: acceptable  Hydration status: euvolemic    PONV: none          No notable events documented.     Nurse Pain Score: 0 (NPRS)

## 2023-09-26 ENCOUNTER — PHARMACY VISIT (OUTPATIENT)
Dept: PHARMACY | Facility: MEDICAL CENTER | Age: 88
End: 2023-09-26
Payer: COMMERCIAL

## 2023-09-26 ENCOUNTER — APPOINTMENT (OUTPATIENT)
Dept: RADIOLOGY | Facility: MEDICAL CENTER | Age: 88
DRG: 267 | End: 2023-09-26
Payer: MEDICARE

## 2023-09-26 VITALS
HEART RATE: 81 BPM | BODY MASS INDEX: 26.17 KG/M2 | SYSTOLIC BLOOD PRESSURE: 114 MMHG | DIASTOLIC BLOOD PRESSURE: 56 MMHG | RESPIRATION RATE: 16 BRPM | TEMPERATURE: 98.1 F | WEIGHT: 147.71 LBS | HEIGHT: 63 IN | OXYGEN SATURATION: 93 %

## 2023-09-26 PROBLEM — I25.10 CORONARY ARTERY DISEASE INVOLVING NATIVE CORONARY ARTERY OF NATIVE HEART WITHOUT ANGINA PECTORIS: Status: ACTIVE | Noted: 2023-09-26

## 2023-09-26 PROBLEM — I35.0 SEVERE AORTIC STENOSIS: Status: RESOLVED | Noted: 2017-11-02 | Resolved: 2023-09-26

## 2023-09-26 LAB
ALBUMIN SERPL BCP-MCNC: 3.1 G/DL (ref 3.2–4.9)
ALBUMIN/GLOB SERPL: 1.1 G/DL
ALP SERPL-CCNC: 63 U/L (ref 30–99)
ALT SERPL-CCNC: 13 U/L (ref 2–50)
ANION GAP SERPL CALC-SCNC: 11 MMOL/L (ref 7–16)
AST SERPL-CCNC: 19 U/L (ref 12–45)
BILIRUB SERPL-MCNC: 0.4 MG/DL (ref 0.1–1.5)
BUN SERPL-MCNC: 23 MG/DL (ref 8–22)
CALCIUM ALBUM COR SERPL-MCNC: 9.5 MG/DL (ref 8.5–10.5)
CALCIUM SERPL-MCNC: 8.8 MG/DL (ref 8.5–10.5)
CHLORIDE SERPL-SCNC: 102 MMOL/L (ref 96–112)
CO2 SERPL-SCNC: 23 MMOL/L (ref 20–33)
CREAT SERPL-MCNC: 0.79 MG/DL (ref 0.5–1.4)
EKG IMPRESSION: NORMAL
ERYTHROCYTE [DISTWIDTH] IN BLOOD BY AUTOMATED COUNT: 46.8 FL (ref 35.9–50)
GFR SERPLBLD CREATININE-BSD FMLA CKD-EPI: 72 ML/MIN/1.73 M 2
GLOBULIN SER CALC-MCNC: 2.8 G/DL (ref 1.9–3.5)
GLUCOSE SERPL-MCNC: 136 MG/DL (ref 65–99)
HCT VFR BLD AUTO: 33.6 % (ref 37–47)
HGB BLD-MCNC: 11.2 G/DL (ref 12–16)
INR PPP: 1.21 (ref 0.87–1.13)
LV EJECT FRACT  99904: 65
MCH RBC QN AUTO: 31.3 PG (ref 27–33)
MCHC RBC AUTO-ENTMCNC: 33.3 G/DL (ref 32.2–35.5)
MCV RBC AUTO: 93.9 FL (ref 81.4–97.8)
NT-PROBNP SERPL IA-MCNC: 1275 PG/ML (ref 0–125)
PLATELET # BLD AUTO: 160 K/UL (ref 164–446)
PMV BLD AUTO: 11.4 FL (ref 9–12.9)
POTASSIUM SERPL-SCNC: 4 MMOL/L (ref 3.6–5.5)
PROT SERPL-MCNC: 5.9 G/DL (ref 6–8.2)
PROTHROMBIN TIME: 15.4 SEC (ref 12–14.6)
RBC # BLD AUTO: 3.58 M/UL (ref 4.2–5.4)
SODIUM SERPL-SCNC: 136 MMOL/L (ref 135–145)
WBC # BLD AUTO: 11 K/UL (ref 4.8–10.8)

## 2023-09-26 PROCEDURE — RXMED WILLOW AMBULATORY MEDICATION CHARGE

## 2023-09-26 PROCEDURE — 93005 ELECTROCARDIOGRAM TRACING: CPT

## 2023-09-26 PROCEDURE — 85610 PROTHROMBIN TIME: CPT

## 2023-09-26 PROCEDURE — 80053 COMPREHEN METABOLIC PANEL: CPT

## 2023-09-26 PROCEDURE — 99239 HOSP IP/OBS DSCHRG MGMT >30: CPT

## 2023-09-26 PROCEDURE — 97535 SELF CARE MNGMENT TRAINING: CPT

## 2023-09-26 PROCEDURE — 71045 X-RAY EXAM CHEST 1 VIEW: CPT

## 2023-09-26 PROCEDURE — A9270 NON-COVERED ITEM OR SERVICE: HCPCS

## 2023-09-26 PROCEDURE — 700102 HCHG RX REV CODE 250 W/ 637 OVERRIDE(OP)

## 2023-09-26 PROCEDURE — 93010 ELECTROCARDIOGRAM REPORT: CPT | Performed by: INTERNAL MEDICINE

## 2023-09-26 PROCEDURE — 83880 ASSAY OF NATRIURETIC PEPTIDE: CPT

## 2023-09-26 PROCEDURE — 85027 COMPLETE CBC AUTOMATED: CPT

## 2023-09-26 RX ORDER — WARFARIN SODIUM 5 MG/1
5 TABLET ORAL SEE ADMIN INSTRUCTIONS
Qty: 20 TABLET | Refills: 0 | Status: ACTIVE | OUTPATIENT
Start: 2023-09-26 | End: 2023-10-04

## 2023-09-26 RX ORDER — WARFARIN SODIUM 2.5 MG/1
2.5 TABLET ORAL
Qty: 8 TABLET | Refills: 0 | Status: ACTIVE | OUTPATIENT
Start: 2023-09-26 | End: 2023-10-05 | Stop reason: SDUPTHER

## 2023-09-26 RX ORDER — ASPIRIN 81 MG/1
81 TABLET ORAL DAILY
Qty: 14 TABLET | Refills: 0 | Status: SHIPPED | OUTPATIENT
Start: 2023-09-27 | End: 2023-09-29

## 2023-09-26 RX ADMIN — GABAPENTIN 300 MG: 300 CAPSULE ORAL at 05:28

## 2023-09-26 RX ADMIN — ASPIRIN 81 MG: 81 TABLET, COATED ORAL at 05:28

## 2023-09-26 RX ADMIN — SENNOSIDES AND DOCUSATE SODIUM 2 TABLET: 50; 8.6 TABLET ORAL at 05:29

## 2023-09-26 RX ADMIN — OMEPRAZOLE 20 MG: 20 CAPSULE, DELAYED RELEASE ORAL at 05:28

## 2023-09-26 ASSESSMENT — PAIN DESCRIPTION - PAIN TYPE
TYPE: ACUTE PAIN

## 2023-09-26 ASSESSMENT — FIBROSIS 4 INDEX: FIB4 SCORE: 2.87

## 2023-09-26 NOTE — DISCHARGE INSTRUCTIONS
Transcatheter Aortic Valve Replacement (TAVR)    General Instructions:  Take Medication as directed.  You will likely need to take aspirin and another blood thinner (antiplatelet medication) for a period.  You'll need to take the aspirin for the rest of your life.  Be sure your doctor knows about all other medicines you take, including over-the-counter medicines and dietary supplements.    Incision Care Instructions:  Look and feel the site(s) of your TAVR TODAY so you can recognize changes that should be called to your doctor (see below).  It's normal for your incision to be bruised, itchy, or sore while it's healing.  Your incision may take a week or more to heal.  Bruising may occur.  Some of the discoloration may travel down the leg.  As it resolves, the color should change from blue to green to yellow-brown.  A small, round lump under the TAVR site may remain for up to 6 weeks.  Wash your incision site every day with warm water and soap.  Gently pat it dry.  Don't put powder, lotion, or ointment on the incision until it's healed.    Activity:  No driving for one week  If you must take a long car ride (not as a ), stop every hour and walk around the car.  No lifting or pulling objects over 5 pounds for 4-5 days.  Warm showers or baths are permitted after the bandage is removed.  Walk regularly.  One of the best ways to get stronger is to walk.  Walk a little more each day.  Take someone with you until you feel OK to walk alone.    When to call your health care provider:  You develop a fever.  Redness, swelling, bleeding, warmth, or fluid draining at the incision site.  Bruising appears to be new or does not look like it is getting better.  The small, round lump in the groin in the area of the TAVR site increases in size.    Seek immediate medical help if you have any of the following symptoms:  You experience any leg numbness, aching, or discomfort  Chest pain or trouble breathing (call 911)  Sudden  numbness or weakness in your face, arms, or legs (call 911)  Bowel movement that is bright red  Dizziness or fainting  Weight gain of more than 2 pounds in 24 hours or more than 5 pounds in 1 week  Swelling in your hands, feet, or ankles  Shortness of breath that doesn't get better when you rest  Pain that gets worse or doesn't go away  Fast or irregular pulse       You have been prescribed the drug warfarin. Please understand the importance of monitoring warfarin with scheduled PT/INR blood draws.  [facility] in one week for INR lab..    Warfarin Information  Warfarin is a blood thinner (anticoagulant). Anticoagulants help to prevent the formation of blood clots or keep them from getting bigger. Your health care provider will monitor the anticoagulation effect of warfarin closely and will adjust your medicine as needed.  Who should use warfarin?  Warfarin is prescribed for people who have blood clots, or who are at risk for developing harmful blood clots, such as people who:  Have mechanical heart valves.  Have irregular heart rhythms (atrial fibrillation).  Have certain clotting disorders.  Have had blood clots in the past or are currently receiving treatment for them. This includes people who have had a stroke, blood clots in the lungs (pulmonary embolism, or PE), or blood clots in the legs (deep vein thrombosis,or DVT).  How is warfarin taken?  Warfarin is taken by mouth (orally). Warfarin tablets come in different strengths. The strength is printed on the tablet, and each strength is a different color. If you get a new prescription and the color of your tablet is different than usual, tell your pharmacist or health care provider immediately.  Take warfarin exactly as told by your health care provider, at the same time every day. Doing this helps you avoid bleeding or blood clots that could result in serious injury, pain, or disability.  Contact your health care provider if a dose is  forgotten or missed. Do not change or take additional dosesto make up for missed or accidental extra doses.  What blood tests do I need while taking warfarin?    Warfarin is a medicine that needs to be closely monitored with blood tests. It is very important to keep all lab visits and follow-up visits with your health care provider. These tests measure the blood's ability to clot and are called prothrombin tests (PT)or international normalized ratio (INR) tests. These tests can be done with a finger stick or a blood draw.  What does the INR test result mean?  The PT test results will be reported as the INR. Your health care provider will tell you your target INR range. If your INR is not in your target range, your health care provider may adjust your dosage.  If your INR is above your target range, there is a risk of bleeding. Your dosage of warfarin may need to be decreased.  If your INR is below your target range, there is a risk of clotting. Your dosage of warfarin may need to be increased.  How often is the INR test needed?  When you first start warfarin, you will usually have your INR checked every few days until the health care provider determines the correct dosage of warfarin.  After you have reached your target INR, your INR will be tested less often. However, you will need to have your INR checked at least once every 4-6 weeks while you take warfarin.  Some people may be able to use home monitoring to check their INR. Ask your health care provider if this applies to you.  What are the side effects of warfarin?  Too much warfarin can cause bleeding or hemorrhage in any part of the body, such as:  Bleeding from the gums.  Unexplained bruises or bruises that get larger.  A nosebleed that is not easily stopped.  Bleeding in the brain (hemorrhagic stroke).  Coughing up or vomiting blood.  Blood in the urine or stools.  Warfarin may also cause:  Skin rash or irritations.  Nausea that does not go away.  Severe  pain in the back or joints.  Painful toes that turn blue or purple (purple toe syndrome).  Painful ulcers that do not go away (skin necrosis).  What precautions do I need to take while using warfarin?  Wear a medical alert bracelet or carry a card that lists what medicines you take.  Make sure that all health care providers, including your dentist, know you are taking warfarin.  Avoid situations that cause bleeding by:  Using a softer toothbrush.  Flossing with waxed floss.  Shaving with an electric razor, not with a blade.  Limiting your use of sharp objects.  Avoiding activities that put you at risk for injury, such as contact sports.  What do I need to know about warfarin and pregnancy or breastfeeding?  If you are taking warfarin and you become pregnant, or plan to become pregnant, contact your health care provider right away. Though warfarin has been associated with birth defects, it can be used in some cases after weighing risks to mother and baby.  If you plan to breastfeed while taking warfarin, talk with your health care provider first.  What do I need to know about warfarin and alcohol or drug use?  Do not drink alcohol if:  Your health care provider tells you not to drink.  You are pregnant, may be pregnant, or are planning to become pregnant.  If you drink alcohol:  Limit how much you have to:  0-1 drink a day for women.  0-2 drinks a day for men.  Know how much alcohol is in your drink. In the [place] one drink equals one 12 oz bottle of beer (355 mL), one 5 oz glass of wine (148 mL), or one 1½ oz glass of hard liquor (44 mL).  If you change the amount of alcohol that you drink, tell your health care provider. Your warfarin dosage may need to be changed.  Do not use any products that contain nicotine or tobacco. These products include cigarettes, chewing tobacco, and vaping devices, such as e-cigarettes. If you need help quitting, ask your health care provider.  If you use nicotine or tobacco products and  change the amount that you use, tell your health care provider. Your warfarin dosage may need to be changed.  Avoid drug use while taking warfarin. The effects of drugs on warfarin are not known.  What do I need to know about warfarin and other medicines or supplements?  Many prescription and over-the-counter medicines can interfere with warfarin. Talk with your health care provider or your pharmacist before starting or stopping any new medicines. This includes vitamins, herbs, supplements, and pain medicines.  Some common over-the-counter medicines that may increase the risk of dangerous bleeding while taking warfarin include:  Aspirin.  NSAIDs, such as ibuprofen or naproxen.  Vitamin E.  Fish oils.  What do I need to know about warfarin and my diet?  Vitamin K decreases the effect of warfarin, and it is found in many foods. Eat a consistent amount of foods that contain vitamin K. For example, you may decide to eat 2 servings of vitamin K-containing foods each day.  It is important to maintain a normal, balanced diet while taking warfarin. Avoid major changes in your diet. If you are going to change your diet, talk with your health care provider before making changes.  Your health care provider may recommend that you work with a dietitian.  Contact a health care provider if you:  Miss a dose.  Take an extra dose.  Plan to have any kind of surgery or procedure. Ask whether you should stop taking warfarin or change your dose before your surgery.  Are unable to take your medicine due to nausea, vomiting, or diarrhea.  Have any major changes in your diet, or you plan to make major changes in your diet.  Start or stop any over-the-counter medicine, prescription medicine, herbal supplement, or dietary supplement.  Become pregnant, plan to become pregnant, or think you may be pregnant.  Have menstrual periods that are heavier than usual.  Have unusual bruising.  Get help right away if you:  Have signs of an allergic  "reaction, such as:  Swelling of the lips, face, tongue, mouth, or throat.  Rash or itchy, red, swollen areas of skin (hives).  Trouble breathing.  Chest tightness.  Fall or have an accident, especially if you hit your head.  Have signs that your blood is too thin, such as:  Blood in your urine. Your urine may look reddish, pinkish, or tea-colored.  Blood in your stool. Your stool may be black or bright red.  Coughing up or vomiting blood. The blood may be bright red, or it may look like coffee grounds.  Bleeding that does not stop after applying pressure to the area for 30 minutes.  Have signs of a blood clot in your leg or arm, such as:  Pain or swelling in your leg or arm.  Skin that is red or warm to the touch on your arm or leg.  Have signs of blood in your lung, such as:  Shortness of breath or difficulty breathing.  Chest pain.  Unexplained fever.  Have any symptoms of a stroke. \"BE FAST\" is an easy way to remember the main warning signs of a stroke:  B - Balance. Signs are dizziness, sudden trouble walking, or loss of balance.  E - Eyes. Signs are trouble seeing or a sudden change in vision.  F - Face. Signs are sudden weakness or numbness of the face, or the face or eyelid drooping on one side.  A - Arms. Signs are weakness or numbness in an arm. This happens suddenly and usually on one side of the body.  S - Speech. Signs are sudden trouble speaking, slurred speech, or trouble understanding what people say.  T - Time. Time to call emergency services. Write down what time symptoms started.  Have other signs of a stroke, such as:  A sudden, severe headache with no known cause.  Nausea or vomiting.  Seizure.  Have other signs of a reaction to warfarin, such as:  Purple or blue toes.  Skin ulcers that do not go away.  These symptoms may represent a serious problem that is an emergency. Do not wait to see if the symptoms will go away. Get medical help right away. Call your local emergency services (911 in the " [place] Do not drive yourself to the hospital.  Summary  Warfarin is a medicine that thins blood. It is used to prevent or treat blood clots. You must be monitored closely while on this medicine. Keep all follow-up visits.  Make sure that you know your target INR range and your warfarin dosage.  Wear or carry identification that says you are taking warfarin.  Take warfarin at the same time every day. Call your health care provider if you miss a dose or if you take an extra dose. Do not change the dosage of warfarin on your own.  Know the signs and symptoms of blood clots, bleeding, and a stroke. Know when to get emergency medical help.  This information is not intended to replace advice given to you by your health care provider. Make sure you discuss any questions you have with your health care provider.  Document Revised: 03/09/2022 Document Reviewed: 03/09/2022  [employer] Patient Education © 2023 [employer]    Aspirin Tablets  What is this medication?  ASPIRIN (AS pir in) lowers the risk of heart attack, stroke, or blood clot. It may also be used to treat mild to moderate pain, inflammation, or arthritis. It belongs to a group of medications called NSAIDs.  This medicine may be used for other purposes; ask your health care provider or pharmacist if you have questions.  COMMON BRAND NAME(S): Aspir-Low, Aspir-Wen, Aspirtab, Madelyn Advanced Aspirin, Madelyn Aspirin, Madelyn Aspirin Extra Strength, Madelyn Aspirin Plus, Madelyn Extra Strength, Madelyn Extra Strength Plus, Madelyn Genuine Aspirin, Madelyn Womens Aspirin, Bufferin, Bufferin Extra Strength, Bufferin Low Dose  What should I tell my care team before I take this medication?  They need to know if you have any of these conditions:  Anemia  Asthma  Bleeding problems  Diabetes  Gout  History of stomach ulcers or bleeding  If you often drink alcohol  Kidney disease  Liver disease  Low level of vitamin K  Lupus  Smoke tobacco  An unusual or allergic reaction to aspirin,  tartrazine dye, other medications, dyes, or preservatives  Pregnant or trying to get pregnant  Breast-feeding  How should I use this medication?  Take this medication by mouth with a glass of water. Follow the directions on the package or prescription label. You can take this medication with or without food. If it upsets your stomach, take it with food. Do not take it more often than directed.  Talk to your care team about the use of this medication in children. While this medication may be prescribed for children as young as 12 years of age for selected conditions, precautions do apply. Children and teenagers should not use this medication to treat chicken pox or flu symptoms unless directed by a care team.  Patients over 65 years old may have a stronger reaction and need a smaller dose.  Overdosage: If you think you have taken too much of this medicine contact a poison control center or emergency room at once.  NOTE: This medicine is only for you. Do not share this medicine with others.  What if I miss a dose?  If you are taking this medication on a regular schedule and miss a dose, take it as soon as you can. If it is almost time for your next dose, take only that dose. Do not take double or extra doses.  What may interact with this medication?  Do not take this medication with any of the following:  Cidofovir  Ketorolac  Probenecid  This medication may also interact with the following:  Alcohol  Alendronate  Bismuth subsalicylate  Flavocoxid  Herbal supplements like feverfew, garlic, shaun, ginkgo biloba, horse chestnut  Medications for diabetes or glaucoma like acetazolamide, methazolamide  Medications for gout  Medications that prevent or treat blood clots like apixaban, clopidogrel, enoxaparin, heparin, rivaroxaban, warfarin  Other aspirin and aspirin-like medications  NSAIDs, medications for pain and inflammation, like ibuprofen or naproxen  Pemetrexed  Sulfinpyrazone  Varicella live vaccine  This list may  not describe all possible interactions. Give your health care provider a list of all the medicines, herbs, non-prescription drugs, or dietary supplements you use. Also tell them if you smoke, drink alcohol, or use illegal drugs. Some items may interact with your medicine.  What should I watch for while using this medication?  If you are treating yourself for pain, tell your doctor or health care provider if the pain lasts more than 10 days, if it gets worse, or if there is a new or different kind of pain. Tell your doctor if you see redness or swelling. Also, check with your doctor if you have a fever that lasts for more than 3 days. Only take this medication to prevent heart attacks or blood clotting if prescribed by your doctor or health care provider.  Do not take other medications that contain aspirin, ibuprofen, or naproxen with this medication. Side effects such as stomach upset, nausea, or ulcers may be more likely to occur. Many non-prescription medications contain aspirin, ibuprofen, or naproxen. Always read labels carefully.  This medication can cause serious ulcers and bleeding in the stomach. It can happen with no warning. Smoking, drinking alcohol, older age, and poor health can also increase risks. Call your health care provider right away if you have stomach pain or blood in your vomit or stool.  Alcohol may interfere with the effect of this medication. Avoid alcoholic drinks.  This medication may cause serious skin reactions. They can happen weeks to months after starting the medication. Contact your health care provider right away if you notice fevers or flu-like symptoms with a rash. The rash may be red or purple and then turn into blisters or peeling of the skin. Or, you might notice a red rash with swelling of the face, lips or lymph nodes in your neck or under your arms.  Talk to your health care provider if you are pregnant before taking this medication. Taking this medication between weeks 20  and 30 of pregnancy may harm your unborn baby. Your health care provider will monitor you closely if you need to take it. After 30 weeks of pregnancy, do not take this medication.  Be careful brushing or flossing your teeth or using a toothpick because you may get an infection or bleed more easily. If you have any dental work done, tell your dentist you are receiving this medication.  This medication may make it more difficult to get pregnant. Talk to your health care provider if you are concerned about your fertility.  What side effects may I notice from receiving this medication?  Side effects that you should report to your care team as soon as possible:  Allergic reactions--skin rash, itching, hives, swelling of the face, lips, tongue, or throat  Bleeding--bloody or black, tar-like stools, vomiting blood or brown material that looks like coffee grounds, red or dark brown urine, red or purple spots on skin, unusual bruising or bleeding  Hearing loss, ringing in ears  Kidney injury--decrease in the amount of urine, swelling of the ankles, hands, or feet  Liver injury--right upper belly pain, loss of appetite, nausea, light-colored stool, dark yellow or brown urine, yellowing of the skin or eyes, unusual weakness, fatigue  Rash, fever, and swollen lymph nodes  Side effects that usually do not require medical attention (report to your care team if they continue or are bothersome):  Headache  Loss of appetite  Nausea  Upset stomach  This list may not describe all possible side effects. Call your doctor for medical advice about side effects. You may report side effects to FDA at 9-731-FDA-4972.  Where should I keep my medication?  Keep out of the reach of children and pets.  Store at room temperature between 15 and 30 degrees C (59 and 86 degrees F). Protect from heat and moisture. Get rid of any unused medication after the expiration date.  Do not use this medication if it has a strong vinegar smell.  To get rid of  medications that are no longer needed or have :  Take the medication to a medication take-back program. Check with your pharmacy or law enforcement to find a location.  If you cannot return the medication, check the label or package insert to see if the medication should be thrown out in the garbage or flushed down the toilet. If you are not sure, ask your care team. If it is safe to put it in the trash, empty the medication out of the container. Mix the medication with cat litter, dirt, coffee grounds, or other unwanted substance. Seal the mixture in a bag or container. Put it in the trash.  NOTE: This sheet is a summary. It may not cover all possible information. If you have questions about this medicine, talk to your doctor, pharmacist, or health care provider.  ©  [employer] (2021-10-29 00:00:00)    Activity    No Heavy Lifting    Do not lift anything heavier than 5 pounds  No heavy lifting for 5 days      No Driving or Heavy Equipment    Do not drive or use heavy machinery/equipment for 1 week.  You may ride in a car as a passenger.  You may need a medical release form signed by your follow-up provider before you can drive again.

## 2023-09-26 NOTE — PROGRESS NOTES
12 hour chart check complete.     Monitoring Summary:  Rhythm: NSR 75-99 bpm  Ectopy: PAC(o)/PVC(r)

## 2023-09-26 NOTE — PROGRESS NOTES
Bedside report received from MARCO Parker. Assumed patient care. No signs of distress at this time. Tele monitor on, rhythm and monitor summary verified. All lines IV traced, medications and rates verified. Safety precautions in place. Call light and personal belongings within reach. Educated patient to use call light if assistance is needed. Will continue to monitor.

## 2023-09-26 NOTE — CARE PLAN
The patient is Watcher - Medium risk of patient condition declining or worsening    Shift Goals  Clinical Goals: stable post TAVR, stable hemodynamics  Patient Goals: rest, comfort  Family Goals: updates    Progress made toward(s) clinical / shift goals:    Problem: Fall Risk  Goal: Patient will remain free from falls  Outcome: Progressing     Problem: Knowledge Deficit - Standard  Goal: Patient and family/care givers will demonstrate understanding of plan of care, disease process/condition, diagnostic tests and medications  Outcome: Progressing     Problem: Skin Integrity  Goal: Skin integrity is maintained or improved  Outcome: Progressing       Patient is not progressing towards the following goals:

## 2023-09-26 NOTE — CARE PLAN
The patient is Stable - Low risk of patient condition declining or worsening    Shift Goals  Clinical Goals: stable post TAVR, stable hemodynamics  Patient Goals: rest, comfort  Family Goals: updates    Progress made toward(s) clinical / shift goals:    Problem: Fall Risk  Goal: Patient will remain free from falls  Outcome: Progressing     Problem: Knowledge Deficit - Standard  Goal: Patient and family/care givers will demonstrate understanding of plan of care, disease process/condition, diagnostic tests and medications  Outcome: Progressing     Problem: Skin Integrity  Goal: Skin integrity is maintained or improved  Outcome: Progressing     Problem: Pain - Standard  Goal: Alleviation of pain or a reduction in pain to the patient’s comfort goal  Outcome: Progressing       Patient is not progressing towards the following goals:

## 2023-09-26 NOTE — DISCHARGE SUMMARY
PRIMARY DISCHARGE DIAGNOSIS: Status post transcatheter aortic valve replacement.    DISCHARGE DIAGNOSIS:  S/P TAVR    PROCEDURES/TESTIN. Successful transcatheter aortic valve replacement (TAVR) with # 26 mm S3 Ultra Resilia Garza bovine pericardial valve, -1 mL in balloon on 2023 under general anesthesia  2. Intraoperative transesophageal echocardiogram showing results pending  3. CXR on 2023 showing  1.  Left basilar atelectasis or early infiltrate.  2.  Cardiomegaly  3.  Atherosclerosis  4.  Perihilar interstitial prominence and bronchial wall cuffing, appearance suggests changes of underlying bronchial inflammation, consider bronchitis.  4. EKG on 2023   Sinus rhythm, prolonged WI interval, probable left atrial enlargement, right bundle branch block, LVH with IVCD and secondary repolarization abnormality  Rate 77, 0.229/0.151/0.460    Labs   Lab Results   Component Value Date/Time    SODIUM 136 2023 03:20 AM    POTASSIUM 4.0 2023 03:20 AM    CHLORIDE 102 2023 03:20 AM    CO2 23 2023 03:20 AM    GLUCOSE 136 (H) 2023 03:20 AM    BUN 23 (H) 2023 03:20 AM    CREATININE 0.79 2023 03:20 AM       Lab Results   Component Value Date/Time    PROTHROMBTM 15.4 (H) 2023 03:20 AM    INR 1.21 (H) 2023 03:20 AM       Lab Results   Component Value Date/Time    WBC 11.0 (H) 2023 03:20 AM    RBC 3.58 (L) 2023 03:20 AM    HEMOGLOBIN 11.2 (L) 2023 03:20 AM    HEMATOCRIT 33.6 (L) 2023 03:20 AM    MCV 93.9 2023 03:20 AM    MCH 31.3 2023 03:20 AM    MCHC 33.3 2023 03:20 AM    MPV 11.4 2023 03:20 AM    NEUTSPOLYS 72.70 (H) 2023 01:58 PM    LYMPHOCYTES 14.10 (L) 2023 01:58 PM    MONOCYTES 10.10 2023 01:58 PM    EOSINOPHILS 2.20 2023 01:58 PM    BASOPHILS 0.60 2023 01:58 PM         HOSPITAL COURSE: The patient is a pleasant 88 year old female with severe symptomatic aortic stenosis,  right bundle branch block, CAD, hypertension, hyperlipidemia, recurrent DVT, protein S deficiency, presence of IVC filter, rheumatoid arthritis, melanoma, GERD. Due to the patient's symptoms, the patient underwent successful TAVR described as above. Post-procedure, the patient did well. They did not require IV diuresis during their stay and will not continue on oral diuretics post-operatively.  Given her underlying right bundle branch block patient was sent to the [facility] with a temporary transvenous pacing wire.  She did not require any pacing overnight.  Pacing wire was removed on postop day 1.  They were able to ambulate without difficulty. No further events were noted during their stay. They are now off oxygen and are to be discharged to home.    DISCHARGE MEDICATIONS:      Medication List        START taking these medications        Instructions   aspirin 81 MG EC tablet  Start taking on: September 27, 2023   Doctor's comments: Aspirin only until INR is therapeutic  Take 1 Tablet by mouth every day.  Dose: 81 mg     * warfarin 2.5 MG Tabs  Commonly known as: Coumadin   Take 1 Tablet by mouth two times a week. 2.5 mg every Tuesday and Saturday. 5 mg every Monday, Wednesday, Thursday, Friday, and Sunday  Dose: 2.5 mg     * warfarin 5 MG Tabs  Commonly known as: Coumadin  Next Dose Due: This dose will not be started until Wednesday, 9/27/23!   Take 1 Tablet by mouth 5 times per week. 2.5 mg every Tuesday and Saturday. 5 mg every Monday, Wednesday, Thursday, Friday, and Sunday  Dose: 5 mg           * This list has 2 medication(s) that are the same as other medications prescribed for you. Read the directions carefully, and ask your doctor or other care provider to review them with you.                CONTINUE taking these medications        Instructions   acetaminophen 500 MG Tabs  Commonly known as: Tylenol   Take 650 mg by mouth 3 times a day as needed for Mild Pain. 1.5 in am, 1 tab at noon, 2 in pm  Indications:  Pain  Dose: 650 mg     amLODIPine 2.5 MG Tabs  Commonly known as: Norvasc  Next Dose Due: You can restart this dose tonight, 9/26/23!   Take 1 Tablet by mouth at bedtime.  Dose: 2.5 mg     atorvastatin 20 MG Tabs  Commonly known as: Lipitor  Next Dose Due: You can restart this dose tonight, 9/26/23!   Take 1 Tablet by mouth every evening.  Dose: 20 mg     Calcium Carbonate 600 MG Tabs  Next Dose Due: You can restart this medication tomorrow morning, 9/27/23!   Take 600 mg by mouth every morning.  Dose: 600 mg     diphenhydrAMINE-APAP (sleep)  MG Tabs  Next Dose Due: You can restart this dose tonight, 9/26/23!   Take 2 Tablets by mouth at bedtime.  Dose: 2 Tablet     gabapentin 300 MG Caps  Commonly known as: Neurontin  Next Dose Due: You have already received your morning dose today. You can take your afternoon AND evening dose today, 9/26/23!   Take 300 mg by mouth 3 times a day.  Dose: 300 mg     lidocaine 5 % Ptch  Commonly known as: Lidoderm   APPLY 1 PATCH TO SKIN DAILY FOR 12 HOURS ON THEN 12 HOURS OFF     losartan 50 MG Tabs  Commonly known as: Cozaar  Next Dose Due: You can restart this medication tonight, 9/26/23!   Doctor's comments: **Patient requests 90 days supply**  Take 1 Tablet by mouth at bedtime.  Dose: 50 mg     melatonin 5 mg Tabs   Take 10 mg by mouth every evening.  Dose: 10 mg     omeprazole 20 MG delayed-release capsule  Commonly known as: PriLOSEC  Next Dose Due: You already received your dose for today. Please take your next dose at home tomorrow morning on 9/27/23!   Take 1 Capsule by mouth every morning.  Dose: 20 mg     polyethylene glycol/lytes 17 g Pack  Commonly known as: Miralax   Take 1 Packet by mouth 1 time a day as needed (if sennosides and docusate ineffective after 24 hours).  Dose: 17 g     PRESERVISION AREDS 2 PO  Next Dose Due: You can restart with your evening dose today, 9/26/23!   Take 1 Capsule by mouth 2 times a day.  Dose: 1 Capsule     Sodium Fluoride 5000 Plus  1.1 % Crea  Generic drug: SODIUM FLUORIDE (DENTAL GEL)   BRUSH WITH PEA SIZEED AMOUNT ONCE A DAY PREFERABLY BEFORE BEDTIME. SPIT OUT ALL EXCESS. DO NOT RINSE     vitamin D 1000 Unit (25 mcg) Tabs  Commonly known as: cholecalciferol  Next Dose Due: You can restart this med at home tomorrow morning, 9/27/23!   Take 1,000 Units by mouth every morning.  Dose: 1,000 Units            STOP taking these medications      rivaroxaban 20 MG Tabs tablet  Commonly known as: Xarelto              DISCHARGE INSTRUCTIONS: They are given discharge instructions on potential post-operative complications and symptoms to watch out for. Their groin sites were checked and were clean, dry, and intact. Patient or family to notify us for any complications noted on the discharge instructions.  We discussed the use of her Zio patch heart monitor in depth.  Answered patient's questions to the best of my ability.  Discussed signs and symptoms to look out for that could indicate a heart block including but not limited to shortness of breath, dizziness, lightheadedness, syncope.  We discussed at length the indication for warfarin for the next 3 months and to stop taking Xarelto.  We discussed her warfarin schedule which at time of discharge is 2.5 mg on Tuesday and Sunday and 5 mg every other day.  In addition she will take aspirin until her INR is therapeutic.  They will follow up with anticoagulation clinic on Friday, TREVON Mendez on 10/3/2023, in our cardiology office. They will not need labs before their follow up appointment. They will then follow up with Dr. Serrano with a repeat echocardiogram in one month for post TAVR assessment.      Future Appointments   Date Time Provider Department Center   10/3/2023 11:20 AM VASCULAR NURSE PRACTITIONER [facility] None   10/3/2023  1:15 PM CRISTINO Ly None   10/23/2023  2:15 PM [facility] EXAM 9 ECHO [facility]   10/31/2023  9:00 AM Haseeb Serrano M.D. [facility] None    12/15/2023  3:00 PM DOMINIQUE CASTELLANOS PHARMACIST [facility]   12/28/2023  1:00 PM Deanna Escoto M.D. [facility] None   9/23/2024  1:15 PM [facility] EXAM 9 ECHO [facility]       Discharge time spent with patient was 36 minutes.

## 2023-09-29 ENCOUNTER — ANTICOAGULATION VISIT (OUTPATIENT)
Dept: VASCULAR LAB | Facility: MEDICAL CENTER | Age: 88
End: 2023-09-29
Attending: INTERNAL MEDICINE
Payer: MEDICARE

## 2023-09-29 DIAGNOSIS — Z79.01 LONG TERM (CURRENT) USE OF ANTICOAGULANTS: ICD-10-CM

## 2023-09-29 LAB — INR PPP: 4.2 (ref 2–3.5)

## 2023-09-29 PROCEDURE — 99212 OFFICE O/P EST SF 10 MIN: CPT

## 2023-09-29 PROCEDURE — 85610 PROTHROMBIN TIME: CPT

## 2023-09-29 NOTE — PROGRESS NOTES
Anticoagulation Summary  As of 2023      INR goal:  2.0-3.0   TTR:  0.0 % (3 y)   INR used for dosin.20 (2023)   Warfarin maintenance plan:  No maintenance plan   Plan last modified:  Steve Hahn, PharmD (2020)   Next INR check:  10/2/2023   Target end date:  Indefinite    Indications    Long term (current) use of anticoagulants [Z79.01]  Deep vein thrombosis - recurrent  on chronic anticoagulation (Resolved) [I82.409]  Atrial fibrillation [I48.91] (Resolved) [I48.91]                 Anticoagulation Episode Summary       INR check location:      Preferred lab:      Send INR reminders to:      Comments:            Anticoagulation Care Providers       Provider Role Specialty Phone number    Davon Waters M.D. Referring Internal Medicine [phone]    [facility] Responsible  [phone]          Refer to Patient Findings for HPI:  Patient Findings       Positives:  Hospital admission (Underwent TAVR on 23. Xarelto discontinued and switched to warfarin x 3 months. Patient to take aspirin until INR is therapeutic.)    Negatives:  Signs/symptoms of thrombosis, Signs/symptoms of bleeding, Laboratory test error suspected, Change in health, Change in alcohol use, Change in activity, Upcoming invasive procedure, Emergency department visit, Upcoming dental procedure, Missed doses, Extra doses, Change in medications, Change in diet/appetite, Bruising, Other complaints            There were no vitals filed for this visit.  Pt declined vitals    Verified current warfarin dosing schedule.  Of note, patient was on warfarin several years ago. Provided education today.    Medications reconciled: Yes  Pt is on aspirin 81 mg daily until INR is therapeutic per discharge instructions.      A/P   INR is supratherapeutic. Recently initiated warfarin and determining dose instructions. Given rapid increase in INR and advanced age, will hold/reduce doses.    Warfarin dosing recommendation:  Hold today, then 1.25 mg Sat/Sun  Stop aspirin.    Pt educated to contact our clinic with any changes in medications or s/s of bleeding or thrombosis. Pt is aware to seek immediate medical attention for falls, head injury or deep cuts.    Follow up appointment in 3 days.    Martin Crespo, PerlitaD

## 2023-10-02 ENCOUNTER — OFFICE VISIT (OUTPATIENT)
Dept: MEDICAL GROUP | Facility: PHYSICIAN GROUP | Age: 88
End: 2023-10-02
Payer: MEDICARE

## 2023-10-02 ENCOUNTER — ANTICOAGULATION MONITORING (OUTPATIENT)
Dept: MEDICAL GROUP | Facility: PHYSICIAN GROUP | Age: 88
End: 2023-10-02

## 2023-10-02 DIAGNOSIS — Z79.01 LONG TERM (CURRENT) USE OF ANTICOAGULANTS: Primary | ICD-10-CM

## 2023-10-02 LAB — INR PPP: 2.8 (ref 2–3.5)

## 2023-10-02 PROCEDURE — 99999 PR NO CHARGE: CPT | Performed by: INTERNAL MEDICINE

## 2023-10-02 PROCEDURE — 85610 PROTHROMBIN TIME: CPT | Performed by: INTERNAL MEDICINE

## 2023-10-02 PROCEDURE — 93793 ANTICOAG MGMT PT WARFARIN: CPT | Performed by: INTERNAL MEDICINE

## 2023-10-02 NOTE — PROGRESS NOTES
Anticoagulation Summary  As of 10/2/2023        INR goal:  2.0-3.0   TTR:  0.0 % (3 y)   INR used for dosin.80 (10/2/2023)   Warfarin maintenance plan:  2.5 mg (2.5 mg x 1) every day   Weekly warfarin total:  17.5 mg   Plan last modified:  Steve Hahn, PharmD (10/2/2023)   Next INR check:  10/5/2023   Target end date:  Indefinite    Indications    Long term (current) use of anticoagulants [Z79.01]  Deep vein thrombosis - recurrent  on chronic anticoagulation (Resolved) [I82.409]  Atrial fibrillation [I48.91] (Resolved) [I48.91]                      Anticoagulation Episode Summary         INR check location:        Preferred lab:        Send INR reminders to:        Comments:                Anticoagulation Care Providers         Provider Role Specialty Phone number     Davon Waters M.D. Referring Internal Medicine [phone]     [facility] Responsible   [phone]             Anticoagulation Patient Findings  Patient Findings         Negatives:  Signs/symptoms of thrombosis, Signs/symptoms of bleeding, Laboratory test error suspected, Change in health, Change in alcohol use, Change in activity, Upcoming invasive procedure, Emergency department visit, Upcoming dental procedure, Missed doses, Extra doses, Change in medications, Change in diet/appetite, Hospital admission, Bruising, Other complaints                      HPI:   Marry Swanway seen in clinic today, on anticoagulation therapy with warfarin due to history of atrial fibrillation, DVT, and recent TAVR.     Patient's previous INR was supratherapeutic at 4.2 on 23, at which time patient was instructed to hold one dose, then decrease warfarin regimen.  She returns to clinic today to recheck INR to ensure it is therapeutic and thus preventing possible clotting and/or bleeding/bruising complications.     CHADS-VASc = at least 6  (unadjusted ischemic stroke risk/year:  9.2%, which is high risk)     Does patient have  any changes to current medical/health status since last appt (Y/N):  NO  Does patient have any signs/symptoms of bleeding and/or thrombosis since the last appt (Y/N):  NO  Does patient have any interval changes to diet or medications since last appt (Y/N):  NO  Are there any complications or cost restrictions with current therapy (Y/N):  NO      Does patient have [facility] PCP? Yes, Saskia Cavazos A.P.R.NRosalia (If not, please document discussion that patient must be seen at [facility])         Vitals:  declined today     Vitals   There were no vitals filed for this visit.         Asssessment:       INR therapeutic at 2.8, therefore decreasing stroke/VTE risk.   Reason(s) for out of range INR today:  n/a       Pt is not on antiplatelet therapy     Medication reconciliation completed today.     Plan:  Instructed patient to begin new weekly warfarin regimen as detailed above.     Follow up:  Because warfarin is a high risk medication and current CHEST guidelines recommend regular monitoring intervals (few days up to 12 weeks), will have patient return to clinic in 3 days to recheck INR.     Steve Hahn, PharmD, BCACP

## 2023-10-02 NOTE — PROGRESS NOTES
Anticoagulation Summary  As of 10/2/2023      INR goal:  2.0-3.0   TTR:  0.0 % (3 y)   INR used for dosin.80 (10/2/2023)   Warfarin maintenance plan:  2.5 mg (2.5 mg x 1) every day   Weekly warfarin total:  17.5 mg   Plan last modified:  Steve Hahn, PharmD (10/2/2023)   Next INR check:  10/5/2023   Target end date:  Indefinite    Indications    Long term (current) use of anticoagulants [Z79.01]  Deep vein thrombosis - recurrent  on chronic anticoagulation (Resolved) [I82.409]  Atrial fibrillation [I48.91] (Resolved) [I48.91]                 Anticoagulation Episode Summary       INR check location:      Preferred lab:      Send INR reminders to:      Comments:            Anticoagulation Care Providers       Provider Role Specialty Phone number    Davon Waters M.D. Referring Internal Medicine [phone]    [facility] Responsible  [phone]          Anticoagulation Patient Findings  Patient Findings       Negatives:  Signs/symptoms of thrombosis, Signs/symptoms of bleeding, Laboratory test error suspected, Change in health, Change in alcohol use, Change in activity, Upcoming invasive procedure, Emergency department visit, Upcoming dental procedure, Missed doses, Extra doses, Change in medications, Change in diet/appetite, Hospital admission, Bruising, Other complaints                  HPI:   Marry Swanway seen in clinic today, on anticoagulation therapy with warfarin due to history of atrial fibrillation, DVT, and recent TAVR.    Patient's previous INR was supratherapeutic at 4.2 on 23, at which time patient was instructed to hold one dose, then decrease warfarin regimen.  She returns to clinic today to recheck INR to ensure it is therapeutic and thus preventing possible clotting and/or bleeding/bruising complications.    CHADS-VASc = at least 6  (unadjusted ischemic stroke risk/year:  9.2%, which is high risk)    Does patient have any changes to current medical/health  status since last appt (Y/N):  NO  Does patient have any signs/symptoms of bleeding and/or thrombosis since the last appt (Y/N):  NO  Does patient have any interval changes to diet or medications since last appt (Y/N):  NO  Are there any complications or cost restrictions with current therapy (Y/N):  NO     Does patient have [facility] PCP? Yes, CRISTINO Horner (If not, please document discussion that patient must be seen at [facility])       Vitals:  declined today    There were no vitals filed for this visit.     Asssessment:      INR therapeutic at 2.8, therefore decreasing stroke/VTE risk.   Reason(s) for out of range INR today:  n/a      Pt is not on antiplatelet therapy    Medication reconciliation completed today.    Plan:  Instructed patient to begin new weekly warfarin regimen as detailed above.     Follow up:  Because warfarin is a high risk medication and current CHEST guidelines recommend regular monitoring intervals (few days up to 12 weeks), will have patient return to clinic in 3 days to recheck INR.    Steve Hahn, PharmD, BCACP

## 2023-10-03 ENCOUNTER — APPOINTMENT (OUTPATIENT)
Dept: VASCULAR LAB | Facility: MEDICAL CENTER | Age: 88
End: 2023-10-03
Payer: MEDICARE

## 2023-10-03 ENCOUNTER — TELEPHONE (OUTPATIENT)
Dept: HEALTH INFORMATION MANAGEMENT | Facility: OTHER | Age: 88
End: 2023-10-03
Payer: MEDICARE

## 2023-10-03 ENCOUNTER — TELEPHONE (OUTPATIENT)
Dept: CARDIOLOGY | Facility: MEDICAL CENTER | Age: 88
End: 2023-10-03
Payer: MEDICARE

## 2023-10-03 ENCOUNTER — APPOINTMENT (OUTPATIENT)
Dept: CARDIOLOGY | Facility: MEDICAL CENTER | Age: 88
End: 2023-10-03
Attending: NURSE PRACTITIONER
Payer: MEDICARE

## 2023-10-03 NOTE — TELEPHONE ENCOUNTER
Patient cancelled 1 week post TAVR follow up visit.    Called and spoke with patient. Discussed reasoning for 1 week follow up visit. Appointment rescheduled for 10/04/2023. Patient verbalizes understanding.

## 2023-10-04 ENCOUNTER — OFFICE VISIT (OUTPATIENT)
Dept: CARDIOLOGY | Facility: MEDICAL CENTER | Age: 88
End: 2023-10-04
Attending: NURSE PRACTITIONER
Payer: MEDICARE

## 2023-10-04 VITALS
WEIGHT: 157 LBS | HEART RATE: 83 BPM | BODY MASS INDEX: 27.82 KG/M2 | DIASTOLIC BLOOD PRESSURE: 62 MMHG | SYSTOLIC BLOOD PRESSURE: 116 MMHG | OXYGEN SATURATION: 94 % | HEIGHT: 63 IN | RESPIRATION RATE: 14 BRPM

## 2023-10-04 DIAGNOSIS — Z79.01 LONG TERM (CURRENT) USE OF ANTICOAGULANTS: ICD-10-CM

## 2023-10-04 DIAGNOSIS — I45.10 RIGHT BUNDLE BRANCH BLOCK: ICD-10-CM

## 2023-10-04 DIAGNOSIS — Z86.718 HISTORY OF RECURRENT DEEP VEIN THROMBOSIS (DVT): ICD-10-CM

## 2023-10-04 DIAGNOSIS — E78.49 OTHER HYPERLIPIDEMIA: ICD-10-CM

## 2023-10-04 DIAGNOSIS — D68.59 PROTEIN S DEFICIENCY (HCC): ICD-10-CM

## 2023-10-04 DIAGNOSIS — I65.23 CAROTID ATHEROSCLEROSIS, BILATERAL: ICD-10-CM

## 2023-10-04 DIAGNOSIS — Z95.828 PRESENCE OF IVC FILTER: ICD-10-CM

## 2023-10-04 DIAGNOSIS — I25.10 CORONARY ARTERY DISEASE INVOLVING NATIVE CORONARY ARTERY OF NATIVE HEART WITHOUT ANGINA PECTORIS: ICD-10-CM

## 2023-10-04 DIAGNOSIS — I10 ESSENTIAL HYPERTENSION, BENIGN: ICD-10-CM

## 2023-10-04 DIAGNOSIS — Z95.2 S/P TAVR (TRANSCATHETER AORTIC VALVE REPLACEMENT): ICD-10-CM

## 2023-10-04 LAB — EKG IMPRESSION: NORMAL

## 2023-10-04 PROCEDURE — 93005 ELECTROCARDIOGRAM TRACING: CPT | Performed by: NURSE PRACTITIONER

## 2023-10-04 PROCEDURE — 99214 OFFICE O/P EST MOD 30 MIN: CPT | Performed by: NURSE PRACTITIONER

## 2023-10-04 PROCEDURE — 3078F DIAST BP <80 MM HG: CPT | Performed by: NURSE PRACTITIONER

## 2023-10-04 PROCEDURE — 3074F SYST BP LT 130 MM HG: CPT | Performed by: NURSE PRACTITIONER

## 2023-10-04 PROCEDURE — 93010 ELECTROCARDIOGRAM REPORT: CPT | Performed by: INTERNAL MEDICINE

## 2023-10-04 ASSESSMENT — ENCOUNTER SYMPTOMS
PALPITATIONS: 0
FEVER: 0
ABDOMINAL PAIN: 0
PND: 0
SHORTNESS OF BREATH: 0
COUGH: 0
MYALGIAS: 0
ORTHOPNEA: 0
DIZZINESS: 0
CLAUDICATION: 0

## 2023-10-04 ASSESSMENT — FIBROSIS 4 INDEX: FIB4 SCORE: 2.87

## 2023-10-04 NOTE — PROGRESS NOTES
"Chief Complaint   Patient presents with    Aortic Stenosis     Subjective     Marry Arley Mathur is a 88 y.o. female who presents today for 1 week post TAVR.    She is a patient of Dr Serrano.    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    She presents today alone. She is overall doing well post-op. She did notice her left groin site has opened up and was bleeding. The glue had fallen off.    She has a cane for mobility and balance.    She has no chest pain, shortness of breath, edema, dizziness/lightheadedness, or palpitations.    Past Medical History:   Diagnosis Date    Adenocarcinoma of colon (HCC) 10/30/2018    From sessile serrated polyp 10/2018    Anesthesia     pt states \"one new dr scraped my esophagus when they put the tube in and I started bleeding so they couldn't operate\"     Back pain 06/01/2022    0/10    Blood clotting disorder (HCC) 2012    clot in leg    Bowel habit changes     constipation     Breath shortness 06/26/2023    with exertion    Cancer (HCC)     skin, colon 2018    Cataract     belia IOL     Chronic back pain greater than 3 months duration     Deep vein blood clot of left lower extremity (HCC) 2022    Deep vein thrombosis (HCC) 03/08/2016    First occurrence in [facility] in late 1970s Second occurrence further up in [facility] in 2012, has been on AC since     Essential hypertension, benign 06/27/2012    GERD (gastroesophageal reflux disease) 06/27/2012    Heart murmur     High cholesterol     Hyperlipidemia     Hypertension     hx of, not currently     Impaired fasting glucose 11/02/2017    Melanoma (HCC)     Mild aortic stenosis     Seeing Dr. Van    Other and unspecified hyperlipidemia 06/27/2012    Prediabetes     Protein S deficiency (HCC) 11/02/2017    Noted in lab work 2013 as a part of work up at [facility]     Rheumatoid arthritis involving multiple sites with positive " rheumatoid factor (HCC) 03/14/2016    Dr. Escoto    Rheumatoid nodule (HCC) 07/25/2017    Right bundle branch block 06/27/2012    pt denies     Urinary incontinence 11/02/2017     Past Surgical History:   Procedure Laterality Date    TRANSCATHETER AORTIC VALVE REPLACEMENT Bilateral 9/25/2023    Procedure: TRANSCATHETER AORTIC VALVE REPLACEMENT;  Surgeon: Haseeb Serrano M.D.;  Location: [facility];  Service: Cardiac    ECHOCARDIOGRAM, TRANSESOPHAGEAL, INTRAOPERATIVE N/A 9/25/2023    Procedure: ECHOCARDIOGRAM, TRANSESOPHAGEAL, INTRAOPERATIVE;  Surgeon: Haseeb Serrano M.D.;  Location: [facility];  Service: Cardiac    PACEMAKER INSERTION Right 9/25/2023    Procedure: INSERTION, TEMPORARY CARDIAC PACEMAKER;  Surgeon: Haseeb Serrano M.D.;  Location: [facility];  Service: Cardiac    LUMBAR FUSION O-ARM  09/21/2022    Procedure: L3-4 and L4-5 decompressive laminectomy, bilateral foraminotomies and L3-4-5 posterior lumbar instrumented fusion;  Surgeon: Rosa M Bonner M.D.;  Location: [facility];  Service: Neurosurgery    LUMBAR DECOMPRESSION  09/21/2022    Procedure: DECOMPRESSION, SPINE, LUMBAR;  Surgeon: Rosa M Bonner M.D.;  Location: [facility];  Service: Neurosurgery    LUMBAR LAMINECTOMY DISKECTOMY  09/21/2022    Procedure: LAMINECTOMY, SPINE, LUMBAR, WITH DISCECTOMY;  Surgeon: Rosa M Bonner M.D.;  Location: [facility];  Service: Neurosurgery    FORAMINOTOMY  09/21/2022    Procedure: FORAMINOTOMY, SPINE;  Surgeon: Rosa M Bonner M.D.;  Location: [facility];  Service: Neurosurgery    HI INJECTION,SACROILIAC JOINT Right 07/12/2022    Procedure: RIGHT sacroiliac joint injection with fluoroscopic guidance.;  Surgeon: Indira Lee M.D.;  [facility];  Service: Pain Management    HI REMOVE ARMPITS LYMPH NODES COMPLT Left 06/07/2022    Procedure: LYMPHADENECTOMY, AXILLARY;  Surgeon: Bernardino Torres M.D.;  Location:  SURGERY SAME DAY [facility];  Service: General    BLOCK EPIDURAL STEROID INJECTION Right 05/31/2022    Procedure: RIGHT L4-5 and L5-S1 transforaminal epidural steroid injection with fluoroscopic guidance.;  Surgeon: Indira Lee M.D.;  Location: [facility];  Service: Pain Management    BLOCK EPIDURAL STEROID INJECTION Right 05/10/2022    Procedure: Lumbar L4-5 interlaminar epidural steroid injection;  Surgeon: Indira Lee M.D.;  Location: [facility];  Service: Pain Management    WIDE EXCISION, LESION, HEAD AND NECK REGION Left 03/29/2022    Procedure: WIDE EXCISION, LESION, HEAD AND NECK REGION - RADICAL MALIGNANT MELANOMA OF LEFT CHEST;  Surgeon: Bernardino Torres M.D.;  Location: SUR[facility]ERY SAME DAY [facility];  Service: General    LUMBAR MEDIAL BRANCH BLOCKS Bilateral 12/15/2020    Procedure: BLOCK, NERVE, SPINAL, LUMBAR, POSTERIOR RAMUS, MEDIAL BRANCH;  Surgeon: Chris Cooper M.D.;  Location: [facility];  Service: Pain Management    IRRIGATION & DEBRIDEMENT ORTHO Bilateral 07/31/2020    Procedure: IRRIGATION AND DEBRIDEMENT, WOUND - KNEE;  Surgeon: Roosevelt Saucedo M.D.;  Location: SURGERY [facility];  Service: Orthopedics    HEMICOLECTOMY Right 12/13/2018    Procedure: OPEN RIGHT HEMICOLECTOMY;  Surgeon: Dash Bhagat M.D.;  Location: SURGERY [facility];  Service: General    INGUINAL HERNIA REPAIR Right 09/23/2016    Procedure: INGUINAL HERNIA REPAIR - PRIMARY;  Surgeon: Mary Medina M.D.;  Location: SURGERY [facility];  Service:     OTHER  08/12/2014    peroneal nerve surgery Dr. Castro     OTHER ORTHOPEDIC SURGERY  2011    meniscus repair    ARTHROPLASTY Left     hip replacement    CHOLECYSTECTOMY      HYSTERECTOMY, TOTAL ABDOMINAL  1970's    KNEE ARTHROPLASTY TOTAL Left     NO PERTINENT PAST SURGICAL HISTORY  2022    Skin Cancer, Lymph nodes removed    OTHER Left     Upper chest, skin cancer removed    ROTATOR CUFF REPAIR  Bilateral      Family History   Problem Relation Age of Onset    Cancer Mother         stomach- ruptured appendix    Cancer Father         colon    Leukemia Father         many exposure, worked for [employer]     Heart Disease Sister     Heart Disease Brother         s/p stent     Other Son         on blood thinner, unclear etiology    Clotting Disorder Neg Hx      Social History     Socioeconomic History    Marital status:      Spouse name: Not on file    Number of children: Not on file    Years of education: Not on file    Highest education level: Not on file   Occupational History    Not on file   Tobacco Use    Smoking status: Never    Smokeless tobacco: Never   Vaping Use    Vaping Use: Never used   Substance and Sexual Activity    Alcohol use: Not Currently     Comment: rarely    Drug use: Never    Sexual activity: Not Currently     Partners: Male     Comment:     Other Topics Concern    Not on file   Social History Narrative    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      Social Determinants of Health     Financial Resource Strain: Not on file   Food Insecurity: Not on file   Transportation Needs: Not on file   Physical Activity: Not on file   Stress: Not on file   Social Connections: Not on file   Intimate Partner Violence: Not on file   Housing Stability: Not on file     Allergies   Allergen Reactions    Wound Dressing Adhesive      Pt says band-aids cause skin reaction/rash if on for more than 2 days  Paper tape OK       Outpatient Encounter Medications as of 10/4/2023   Medication Sig Dispense Refill    warfarin (COUMADIN) 2.5 MG Tab Take 1 Tablet by mouth two times a week. 2.5 mg every Tuesday and Saturday. 5 mg every Monday, Wednesday, Thursday, Friday, and Sunday 8 Tablet 0    amLODIPine (NORVASC) 2.5 MG Tab Take 1 Tablet by mouth at bedtime. 90 Tablet 3    losartan (COZAAR) 50 MG Tab Take 1 Tablet by mouth at bedtime. 90 Tablet 3    omeprazole (PRILOSEC)  20 MG delayed-release capsule Take 1 Capsule by mouth every morning. 90 Capsule 3    diphenhydrAMINE-APAP, sleep,  MG Tab Take 2 Tablets by mouth at bedtime.      lidocaine (LIDODERM) 5 % Patch APPLY 1 PATCH TO SKIN DAILY FOR 12 HOURS ON THEN 12 HOURS OFF      SODIUM FLUORIDE 5000 PLUS 1.1 % Cream BRUSH WITH PEA SIZEED AMOUNT ONCE A DAY PREFERABLY BEFORE BEDTIME. SPIT OUT ALL EXCESS. DO NOT RINSE      atorvastatin (LIPITOR) 20 MG Tab Take 1 Tablet by mouth every evening. 90 Tablet 3    gabapentin (NEURONTIN) 300 MG Cap Take 300 mg by mouth 3 times a day.      Multiple Vitamins-Minerals (PRESERVISION AREDS 2 PO) Take 1 Capsule by mouth 2 times a day.      Calcium Carbonate 600 MG Tab Take 600 mg by mouth every morning.      polyethylene glycol/lytes (MIRALAX) 17 g Pack Take 1 Packet by mouth 1 time a day as needed (if sennosides and docusate ineffective after 24 hours).  3    acetaminophen (TYLENOL) 500 MG Tab Take 650 mg by mouth 3 times a day as needed for Mild Pain. 1.5 in am, 1 tab at noon, 2 in pm  Indications: Pain      melatonin 5 mg Tab Take 10 mg by mouth every evening.      vitamin D (CHOLECALCIFEROL) 1000 UNIT Tab Take 1,000 Units by mouth every morning.      [DISCONTINUED] warfarin (COUMADIN) 5 MG Tab Take 1 Tablet by mouth 5 times per week. 2.5 mg every Tuesday and Saturday. 5 mg every Monday, Wednesday, Thursday, Friday, and Sunday 20 Tablet 0     No facility-administered encounter medications on file as of 10/4/2023.     Review of Systems   Constitutional:  Negative for fever and malaise/fatigue.   Respiratory:  Negative for cough and shortness of breath.    Cardiovascular:  Negative for chest pain, palpitations, orthopnea, claudication, leg swelling and PND.   Gastrointestinal:  Negative for abdominal pain.   Musculoskeletal:  Negative for myalgias.   Neurological:  Negative for dizziness.              Objective     /62 (BP Location: Left arm, Patient Position: Sitting, BP Cuff Size:  "Adult)   Pulse 83   Resp 14   Ht 1.6 m (5' 3\")   Wt 71.2 kg (157 lb)   LMP  (LMP Unknown)   SpO2 94%   BMI 27.81 kg/m²     Physical Exam  Vitals and nursing note reviewed.   Constitutional:       Appearance: Normal appearance. She is well-developed and normal weight.   HENT:      Head: Normocephalic and atraumatic.   Neck:      Vascular: No JVD.   Cardiovascular:      Rate and Rhythm: Normal rate and regular rhythm.      Pulses: Normal pulses.      Heart sounds: Normal heart sounds.   Pulmonary:      Effort: Pulmonary effort is normal.      Breath sounds: Normal breath sounds.   Musculoskeletal:         General: Normal range of motion.   Skin:     General: Skin is warm and dry.      Capillary Refill: Capillary refill takes less than 2 seconds.   Neurological:      General: No focal deficit present.      Mental Status: She is alert and oriented to person, place, and time. Mental status is at baseline.   Psychiatric:         Mood and Affect: Mood normal.         Behavior: Behavior normal.         Thought Content: Thought content normal.         Judgment: Judgment normal.                Assessment & Plan     1. S/P TAVR (transcatheter aortic valve replacement)  EKG      2. Other hyperlipidemia        3. Carotid atherosclerosis, bilateral        4. Coronary artery disease involving native coronary artery of native heart without angina pectoris        5. Essential hypertension, benign        6. History of recurrent deep vein thrombosis (DVT)        7. Long term (current) use of anticoagulants        8. Presence of IVC filter        9. Protein S deficiency (HCC)        10. Right bundle branch block            Medical Decision Making: Today's Assessment/Status/Plan:        HCC Gap Form    Last edited 10/04/23 14:29 PDT by Reena Anguiano, A.P.R.N.       1. S/P TAVR  -doing well post-op  -cont coumadin monotherapy, no ASA  -groins CDI with mild bruising and small nodule but noted to have dermabond glue falling " off on L groin, cleaned and re-dressed with three steri-strips for improved healing  -SBE prophylaxis understands  -echo 1 month with structural heart follow up  -reviewed hospital imaging, labs, and EKG  -cardiac rehab referral placed  -EKG shows SR with RBBB, zio monitor in place, no alerts since discharge    2. HTN  -good control on losartan and amlodipine  -BP goal <130/80    3. HLD with non-obstructive CAD/mild carotid disease  -cont statin  -repeat lipid panel pending  -LDL Goal <70 with CAD/carotid disease    4. Protein S deficiency with IVC filter  -lifelong OAC  -now on coumadin 3 months then OK to go to [facility]    Patient is to follow up with Dr. Serrano in 1 month with echo and labs.

## 2023-10-05 ENCOUNTER — ANCILLARY PROCEDURE (OUTPATIENT)
Dept: VASCULAR LAB | Facility: MEDICAL CENTER | Age: 88
End: 2023-10-05
Attending: INTERNAL MEDICINE
Payer: MEDICARE

## 2023-10-05 ENCOUNTER — DOCUMENTATION (OUTPATIENT)
Dept: CARDIOLOGY | Facility: MEDICAL CENTER | Age: 88
End: 2023-10-05
Payer: MEDICARE

## 2023-10-05 DIAGNOSIS — Z86.718 HISTORY OF RECURRENT DEEP VEIN THROMBOSIS (DVT): ICD-10-CM

## 2023-10-05 DIAGNOSIS — Z95.2 S/P TAVR (TRANSCATHETER AORTIC VALVE REPLACEMENT): ICD-10-CM

## 2023-10-05 DIAGNOSIS — D68.59 PROTEIN S DEFICIENCY (HCC): ICD-10-CM

## 2023-10-05 DIAGNOSIS — Z79.01 LONG TERM (CURRENT) USE OF ANTICOAGULANTS: ICD-10-CM

## 2023-10-05 LAB — INR PPP: 2.3 (ref 2–3.5)

## 2023-10-05 PROCEDURE — 85610 PROTHROMBIN TIME: CPT

## 2023-10-05 PROCEDURE — 99211 OFF/OP EST MAY X REQ PHY/QHP: CPT

## 2023-10-05 RX ORDER — WARFARIN SODIUM 2.5 MG/1
2.5 TABLET ORAL DAILY
Qty: 90 TABLET | Refills: 1 | Status: SHIPPED | OUTPATIENT
Start: 2023-10-05 | End: 2023-10-13

## 2023-10-05 NOTE — PROGRESS NOTES
On behalf of [facility], we would like to thank you for allowing us to participate in the care of your patient.     She underwent a successful transcatheter aortic valve replacement (TAVR).     Your patient is scheduled to follow up with our Structural Heart Program at one month and one year post procedure.     Again, thank you for allowing us to participate in the care of your patient. If you have any questions, please do not hesitate to contact our Structural Heart team.     Sincerely,    [facility] Team    CELIA San, RN, Structural Heart Program Nurse Coordinator ([phone])  CELIA Garcia, RN, Structural Heart Program Nurse Coordinator ([phone])

## 2023-10-05 NOTE — PROGRESS NOTES
Anticoagulation Summary  As of 10/5/2023      INR goal:  2.0-3.0   TTR:  0.3 % (3 y)   INR used for dosin.30 (10/5/2023)   Warfarin maintenance plan:  2.5 mg (2.5 mg x 1) every Tue, Thu, Sat; 1.25 mg (2.5 mg x 0.5) all other days   Weekly warfarin total:  12.5 mg   Plan last modified:  Steve Hahn, PharmD (10/2/2023)   Next INR check:  10/13/2023   Target end date:  Indefinite    Indications    Long term (current) use of anticoagulants [Z79.01]  Deep vein thrombosis - recurrent  on chronic anticoagulation (Resolved) [I82.409]  Atrial fibrillation [I48.91] (Resolved) [I48.91]                 Anticoagulation Episode Summary       INR check location:      Preferred lab:      Send INR reminders to:      Comments:            Anticoagulation Care Providers       Provider Role Specialty Phone number    Davon Waters M.D. Referring Internal Medicine [phone]    [facility] Anticoagulation Services Responsible  [phone]                  Refer to Patient Findings for HPI:  Patient Findings       Negatives:  Signs/symptoms of thrombosis, Signs/symptoms of bleeding, Laboratory test error suspected, Change in health, Change in alcohol use, Change in activity, Upcoming invasive procedure, Emergency department visit, Upcoming dental procedure, Missed doses, Extra doses, Change in medications, Change in diet/appetite, Hospital admission, Bruising, Other complaints            There were no vitals filed for this visit.  pt declined vitals    Verified current warfarin dosing schedule.    Medications reconciled: No  Pt is not on antiplatelet therapy.      A/P   INR is therapeutic. Slightly trending down from last appt. Patient prefers to be seen at [facility] - will have INR drawn at lab rather than return to clinic.     Warfarin dosing recommendation: Continue regimen as listed above.    Pt educated to contact our clinic with any changes in medications or s/s of bleeding or thrombosis. Pt is aware to seek immediate  medical attention for falls, head injury or deep cuts.    Follow up appointment in 4 day(s) as patient is new to warfarin.    Perlita HenleyD

## 2023-10-09 ENCOUNTER — ANTICOAGULATION VISIT (OUTPATIENT)
Dept: MEDICAL GROUP | Facility: PHYSICIAN GROUP | Age: 88
End: 2023-10-09
Payer: MEDICARE

## 2023-10-09 ENCOUNTER — HOSPITAL ENCOUNTER (OUTPATIENT)
Dept: LAB | Facility: MEDICAL CENTER | Age: 88
End: 2023-10-09
Attending: INTERNAL MEDICINE
Payer: MEDICARE

## 2023-10-09 DIAGNOSIS — Z79.01 LONG TERM (CURRENT) USE OF ANTICOAGULANTS: ICD-10-CM

## 2023-10-09 LAB
INR PPP: 2.21 (ref 0.87–1.13)
INR PPP: 2.5 (ref 2–3.5)
PROTHROMBIN TIME: 24.8 SEC (ref 12–14.6)

## 2023-10-09 PROCEDURE — 85610 PROTHROMBIN TIME: CPT

## 2023-10-09 PROCEDURE — 99999 PR NO CHARGE: CPT | Performed by: INTERNAL MEDICINE

## 2023-10-09 PROCEDURE — 93793 ANTICOAG MGMT PT WARFARIN: CPT | Performed by: INTERNAL MEDICINE

## 2023-10-09 PROCEDURE — 85610 PROTHROMBIN TIME: CPT | Performed by: INTERNAL MEDICINE

## 2023-10-09 PROCEDURE — 36415 COLL VENOUS BLD VENIPUNCTURE: CPT

## 2023-10-09 NOTE — PROGRESS NOTES
Anticoagulation Summary  As of 10/9/2023      INR goal:  2.0-3.0   TTR:  0.7 % (3.1 y)   INR used for dosin.50 (10/9/2023)   Warfarin maintenance plan:  2.5 mg (2.5 mg x 1) every Tue, Thu, Sat; 1.25 mg (2.5 mg x 0.5) all other days   Weekly warfarin total:  12.5 mg   Plan last modified:  Steve Hahn, PharmD (10/2/2023)   Next INR check:  10/13/2023   Target end date:  Indefinite    Indications    Long term (current) use of anticoagulants [Z79.01]  Deep vein thrombosis - recurrent  on chronic anticoagulation (Resolved) [I82.409]  Atrial fibrillation [I48.91] (Resolved) [I48.91]                 Anticoagulation Episode Summary       INR check location:      Preferred lab:      Send INR reminders to:      Comments:            Anticoagulation Care Providers       Provider Role Specialty Phone number    Davon Waters M.D. Referring Internal Medicine [phone]    [facility] Anticoagulation Services Responsible  [phone]          Anticoagulation Patient Findings  Patient Findings       Negatives:  Signs/symptoms of thrombosis, Signs/symptoms of bleeding, Laboratory test error suspected, Change in health, Change in alcohol use, Change in activity, Upcoming invasive procedure, Emergency department visit, Upcoming dental procedure, Missed doses, Extra doses, Change in medications, Change in diet/appetite, Hospital admission, Bruising, Other complaints                  HPI:   Marry Mathur seen in clinic today, on anticoagulation therapy with warfarin due to history of atrial fibrillation and DVT.    Patient's previous INR was therapeutic at 2.3 on 10/5/23, at which time patient was instructed to oc. Patient returns to clinic today to recheck INR to ensure it is therapeutic and thus preventing possible clotting and/or bleeding/bruising complications.    CHADS-VASc = 6  (unadjusted ischemic stroke risk/year:  9.2%, which is high risk)    Does patient have any changes to current medical/health status since  last appt: No  Does patient have any signs/symptoms of bleeding and/or thrombosis since the last appt: No  Does patient have any interval changes to diet or medications since last appt: No  Are there any complications or cost restrictions with current therapy: No     Does patient have [facility] PCP? Yes, CRISTINO Horner (If not, please document discussion that patient must be seen at [facility])     Vitals:  declined    There were no vitals filed for this visit.     Asssessment:      INR is therapeutic at 2.5, therefore decreasing risk of stroke/VTE and bleeding  Reason(s) for out of range INR today: N/A      Pt is not on antiplatelet therapy.    Medication reconciliation completed today.    Warfarin dosing recommendation: Continue regimen as listed above.     Follow up:  Because warfarin is a high risk medication and current CHEST guidelines recommend regular monitoring intervals (few days up to 12 weeks), will have patient return to clinic in 4 days to recheck INR.    Polly Lynn, PharmD

## 2023-10-13 ENCOUNTER — ANTICOAGULATION VISIT (OUTPATIENT)
Dept: MEDICAL GROUP | Facility: PHYSICIAN GROUP | Age: 88
End: 2023-10-13
Payer: MEDICARE

## 2023-10-13 ENCOUNTER — TELEPHONE (OUTPATIENT)
Dept: VASCULAR LAB | Facility: MEDICAL CENTER | Age: 88
End: 2023-10-13

## 2023-10-13 ENCOUNTER — TELEPHONE (OUTPATIENT)
Dept: CARDIOLOGY | Facility: MEDICAL CENTER | Age: 88
End: 2023-10-13

## 2023-10-13 DIAGNOSIS — Z79.01 LONG TERM (CURRENT) USE OF ANTICOAGULANTS: Primary | ICD-10-CM

## 2023-10-13 LAB — INR PPP: 2.1 (ref 2–3.5)

## 2023-10-13 PROCEDURE — 85610 PROTHROMBIN TIME: CPT | Performed by: INTERNAL MEDICINE

## 2023-10-13 PROCEDURE — 93793 ANTICOAG MGMT PT WARFARIN: CPT | Performed by: INTERNAL MEDICINE

## 2023-10-13 PROCEDURE — 99999 PR NO CHARGE: CPT | Performed by: INTERNAL MEDICINE

## 2023-10-13 RX ORDER — WARFARIN SODIUM 2.5 MG/1
TABLET ORAL
Qty: 30 TABLET | Refills: 3 | Status: SHIPPED | OUTPATIENT
Start: 2023-10-13 | End: 2023-11-13

## 2023-10-13 NOTE — TELEPHONE ENCOUNTER
Patient called stating she will be out of her warfarin today and her pharmacy won't refill it. Assisted patient in transferring her to the anticoagulation clinic. Patient has an appt with the pharmacist today, but she is concerned and would like this to be addressed prior to her appt so she has time to  her prescription if needed.

## 2023-10-13 NOTE — PROGRESS NOTES
Anticoagulation Summary  As of 10/13/2023      INR goal:  2.0-3.0   TTR:  1.1 % (3.1 y)   INR used for dosin.10 (10/13/2023)   Warfarin maintenance plan:  2.5 mg (2.5 mg x 1) every Tue, Thu, Sat; 1.25 mg (2.5 mg x 0.5) all other days   Weekly warfarin total:  12.5 mg   Plan last modified:  Steve Hahn, PharmD (10/2/2023)   Next INR check:  10/20/2023   Target end date:  Indefinite    Indications    Long term (current) use of anticoagulants [Z79.01]  Deep vein thrombosis - recurrent  on chronic anticoagulation (Resolved) [I82.409]  Atrial fibrillation [I48.91] (Resolved) [I48.91]                 Anticoagulation Episode Summary       INR check location:      Preferred lab:      Send INR reminders to:      Comments:            Anticoagulation Care Providers       Provider Role Specialty Phone number    Davon Waters M.D. Referring Internal Medicine [phone]    [facility] Anticoagulation Services Responsible  [phone]          Anticoagulation Patient Findings  Patient Findings       Negatives:  Signs/symptoms of thrombosis, Signs/symptoms of bleeding, Laboratory test error suspected, Change in health, Change in alcohol use, Change in activity, Upcoming invasive procedure, Emergency department visit, Upcoming dental procedure, Missed doses, Extra doses, Change in medications, Change in diet/appetite, Hospital admission, Bruising, Other complaints                  HPI:   Marry Mathur seen in clinic today, on anticoagulation therapy with warfarin due to history of atrial fibrillation and DVT.     Patient's previous INR was therapeutic at 2.50 on 10/09/2023, at which time patient was instructed to oc. Patient returns to clinic today to recheck INR to ensure it is therapeutic and thus preventing possible clotting and/or bleeding/bruising complications.     CHADS-VASc = 6  (unadjusted ischemic stroke risk/year:  9.2%, which is high risk)     Does patient have any changes to current medical/health status  since last appt: No  Does patient have any signs/symptoms of bleeding and/or thrombosis since the last appt: No  Does patient have any interval changes to diet or medications since last appt: No  Are there any complications or cost restrictions with current therapy: No      Does patient have [facility] PCP? Yes, CRISTINO Horner (If not, please document discussion that patient must be seen at [facility])       Vitals:  There were no vitals filed for this visit.     Asssessment:      INR therapeutic at 2.10, therefore thrombosis risk is decreased as well as bleed risk   Reason(s) for out of range INR today:  N/A      Pt is not on antiplatelet therapy    Medication reconciliation completed today.    Plan:  Pt is to continue with current warfarin dosing regimen.     Follow up:  Because warfarin is a high risk medication and current CHEST guidelines recommend regular monitoring intervals (few days up to 12 weeks), will have patient return to clinic in 1 weeks to recheck INR.    Steve Hahn, PharmD    Cliff Domínguez, Pharmacy Intern

## 2023-10-13 NOTE — TELEPHONE ENCOUNTER
Called and s/w pt's pharmacy - they are processing warfarin refill for p/u tonight as pt is out of medication.    Castillo Tomlin, PharmD, BCACP

## 2023-10-16 ENCOUNTER — TELEPHONE (OUTPATIENT)
Dept: CARDIOLOGY | Facility: MEDICAL CENTER | Age: 88
End: 2023-10-16
Payer: MEDICARE

## 2023-10-16 DIAGNOSIS — Z95.2 S/P TAVR (TRANSCATHETER AORTIC VALVE REPLACEMENT): ICD-10-CM

## 2023-10-16 DIAGNOSIS — I45.10 RIGHT BUNDLE BRANCH BLOCK: ICD-10-CM

## 2023-10-20 ENCOUNTER — ANTICOAGULATION VISIT (OUTPATIENT)
Dept: MEDICAL GROUP | Facility: PHYSICIAN GROUP | Age: 88
End: 2023-10-20
Payer: MEDICARE

## 2023-10-20 DIAGNOSIS — Z79.01 LONG TERM (CURRENT) USE OF ANTICOAGULANTS: Primary | ICD-10-CM

## 2023-10-20 LAB — INR PPP: 1.6 (ref 2–3.5)

## 2023-10-20 PROCEDURE — 85610 PROTHROMBIN TIME: CPT | Performed by: FAMILY MEDICINE

## 2023-10-20 PROCEDURE — 99211 OFF/OP EST MAY X REQ PHY/QHP: CPT | Performed by: FAMILY MEDICINE

## 2023-10-20 NOTE — PROGRESS NOTES
Anticoagulation Summary  As of 10/20/2023      INR goal:  2.0-3.0   TTR:  1.2 % (3.1 y)   INR used for dosin.60 (10/20/2023)   Warfarin maintenance plan:  1.25 mg (2.5 mg x 0.5) every Mon, Wed, Fri; 2.5 mg (2.5 mg x 1) all other days   Weekly warfarin total:  13.75 mg   Plan last modified:  Steve Hahn, PharmD (10/20/2023)   Next INR check:  10/30/2023   Target end date:  Indefinite    Indications    Long term (current) use of anticoagulants [Z79.01]  Deep vein thrombosis - recurrent  on chronic anticoagulation (Resolved) [I82.409]  Atrial fibrillation [I48.91] (Resolved) [I48.91]                 Anticoagulation Episode Summary       INR check location:      Preferred lab:      Send INR reminders to:      Comments:            Anticoagulation Care Providers       Provider Role Specialty Phone number    Davon Waters M.D. Referring Internal Medicine [phone]    [facility] Anticoagulation Services Responsible  [phone]          Anticoagulation Patient Findings  Patient Findings       Negatives:  Signs/symptoms of thrombosis, Signs/symptoms of bleeding, Laboratory test error suspected, Change in health, Change in alcohol use, Change in activity, Upcoming invasive procedure, Emergency department visit, Upcoming dental procedure, Missed doses, Extra doses, Change in medications, Change in diet/appetite, Hospital admission, Bruising, Other complaints                  HPI:   Marry Mathur seen in clinic today, on anticoagulation therapy with warfarin due to history of atrial fibrillation and DVT.     Patient's previous INR was therapeutic at 2.1 on 10/13/2023, at which time patient was instructed to continue with current warfarin regimen. Patient returns to clinic today to recheck INR to ensure it is therapeutic and thus preventing possible clotting and/or bleeding/bruising complications.     CHADS-VASc = 6  (unadjusted ischemic stroke risk/year:  9.2%, which is high risk)     Does patient have any  changes to current medical/health status since last appt: No  Does patient have any signs/symptoms of bleeding and/or thrombosis since the last appt: No  Does patient have any interval changes to diet or medications since last appt: No  Are there any complications or cost restrictions with current therapy: No     Does patient have [facility] PCP? Yes, CRISTINO Horner (If not, please document discussion that patient must be seen at [facility])       Vitals:  declined today    There were no vitals filed for this visit.     Asssessment:      INR subtherapeutic at 1.6, therefore increasing stroke risk.   Reason(s) for out of range INR today:  dose too low.      Pt is not on antiplatelet therapy    Medication reconciliation completed today.    Plan:  Instructed patient to bolus X 1, then increase weekly warfarin regimen as detailed above.     Follow up:  Because warfarin is a high risk medication and current CHEST guidelines recommend regular monitoring intervals (few days up to 12 weeks), will have patient return to clinic in 1.5 weeks to recheck INR.    Steve Hahn, PharmD, BCACP

## 2023-10-23 ENCOUNTER — HOSPITAL ENCOUNTER (OUTPATIENT)
Dept: LAB | Facility: MEDICAL CENTER | Age: 88
End: 2023-10-23
Payer: MEDICARE

## 2023-10-23 ENCOUNTER — HOSPITAL ENCOUNTER (OUTPATIENT)
Dept: CARDIOLOGY | Facility: MEDICAL CENTER | Age: 88
End: 2023-10-23
Attending: INTERNAL MEDICINE
Payer: MEDICARE

## 2023-10-23 DIAGNOSIS — E78.49 OTHER HYPERLIPIDEMIA: ICD-10-CM

## 2023-10-23 DIAGNOSIS — I35.0 NONRHEUMATIC AORTIC (VALVE) STENOSIS: ICD-10-CM

## 2023-10-23 LAB
CHOLEST SERPL-MCNC: 158 MG/DL (ref 100–199)
FASTING STATUS PATIENT QL REPORTED: NORMAL
HDLC SERPL-MCNC: 57 MG/DL
LDLC SERPL CALC-MCNC: 84 MG/DL
TRIGL SERPL-MCNC: 83 MG/DL (ref 0–149)

## 2023-10-23 PROCEDURE — 93306 TTE W/DOPPLER COMPLETE: CPT

## 2023-10-23 PROCEDURE — 80061 LIPID PANEL: CPT

## 2023-10-23 PROCEDURE — 36415 COLL VENOUS BLD VENIPUNCTURE: CPT

## 2023-10-24 LAB — LV EJECT FRACT  99904: 70

## 2023-10-24 PROCEDURE — 93306 TTE W/DOPPLER COMPLETE: CPT | Mod: 26 | Performed by: INTERNAL MEDICINE

## 2023-10-30 ENCOUNTER — ANTICOAGULATION VISIT (OUTPATIENT)
Dept: MEDICAL GROUP | Facility: PHYSICIAN GROUP | Age: 88
End: 2023-10-30
Payer: MEDICARE

## 2023-10-30 DIAGNOSIS — Z79.01 LONG TERM (CURRENT) USE OF ANTICOAGULANTS: Primary | ICD-10-CM

## 2023-10-30 LAB — INR PPP: 1.9 (ref 2–3.5)

## 2023-10-30 PROCEDURE — 85610 PROTHROMBIN TIME: CPT | Performed by: FAMILY MEDICINE

## 2023-10-30 PROCEDURE — 99211 OFF/OP EST MAY X REQ PHY/QHP: CPT | Performed by: INTERNAL MEDICINE

## 2023-10-30 NOTE — PROGRESS NOTES
Anticoagulation Summary  As of 10/30/2023      INR goal:  2.0-3.0   TTR:  1.2 % (3.1 y)   INR used for dosin.90 (10/30/2023)   Warfarin maintenance plan:  1.25 mg (2.5 mg x 0.5) every Wed, Fri; 2.5 mg (2.5 mg x 1) all other days   Weekly warfarin total:  15 mg   Plan last modified:  Steve Hahn, PharmD (10/30/2023)   Next INR check:  2023   Target end date:  Indefinite    Indications    Long term (current) use of anticoagulants [Z79.01]  Deep vein thrombosis - recurrent  on chronic anticoagulation (Resolved) [I82.409]  Atrial fibrillation [I48.91] (Resolved) [I48.91]                 Anticoagulation Episode Summary       INR check location:      Preferred lab:      Send INR reminders to:      Comments:            Anticoagulation Care Providers       Provider Role Specialty Phone number    Davon Waters M.D. Referring Internal Medicine [phone]    [facility] Anticoagulation Services Responsible  [phone]          Anticoagulation Patient Findings  Patient Findings       Negatives:  Signs/symptoms of thrombosis, Signs/symptoms of bleeding, Laboratory test error suspected, Change in health, Change in alcohol use, Change in activity, Upcoming invasive procedure, Emergency department visit, Upcoming dental procedure, Missed doses, Extra doses, Change in medications, Change in diet/appetite, Hospital admission, Bruising, Other complaints                  HPI:   Marry Mathur seen in clinic today, on anticoagulation therapy with warfarin due to history of atrial fibrillation and DVT.     Patient's previous INR was subtherapeutic at 1.6 on 10-, at which time patient was instructed to bolus with one dose, then increase warfarin regimen. Patient returns to clinic today to recheck INR to ensure it is therapeutic and thus preventing possible clotting and/or bleeding/bruising complications.     CHADS-VASc = 6  (unadjusted ischemic stroke risk/year:  9.2%, which is high risk)     Does patient have  any changes to current medical/health status since last appt: No  Does patient have any signs/symptoms of bleeding and/or thrombosis since the last appt: No  Does patient have any interval changes to diet or medications since last appt: No  Are there any complications or cost restrictions with current therapy: No     Does patient have [facility] PCP? Yes, CRISTINO Horner (If not, please document discussion that patient must be seen at [facility])       Vitals:  declined today    There were no vitals filed for this visit.     Asssessment:      INR subtherapeutic at 1.9, therefore increasing stroke risk.   Reason(s) for out of range INR today:  dose too low.      Pt is not on antiplatelet therapy    Medication reconciliation completed today.    Plan:  Instructed patient to increase weekly warfarin regimen as detailed above.     Follow up:  Because warfarin is a high risk medication and current CHEST guidelines recommend regular monitoring intervals (few days up to 12 weeks), will have patient return to clinic in 1 weeks to recheck INR.    Steve Hahn, PharmD, BCACP

## 2023-10-31 ENCOUNTER — OFFICE VISIT (OUTPATIENT)
Dept: CARDIOLOGY | Facility: MEDICAL CENTER | Age: 88
End: 2023-10-31
Attending: INTERNAL MEDICINE
Payer: MEDICARE

## 2023-10-31 ENCOUNTER — DOCUMENTATION (OUTPATIENT)
Dept: CARDIOLOGY | Facility: MEDICAL CENTER | Age: 88
End: 2023-10-31

## 2023-10-31 VITALS
HEIGHT: 63 IN | SYSTOLIC BLOOD PRESSURE: 114 MMHG | DIASTOLIC BLOOD PRESSURE: 64 MMHG | RESPIRATION RATE: 16 BRPM | OXYGEN SATURATION: 95 % | WEIGHT: 157 LBS | BODY MASS INDEX: 27.82 KG/M2 | HEART RATE: 86 BPM

## 2023-10-31 DIAGNOSIS — Z95.2 S/P TAVR (TRANSCATHETER AORTIC VALVE REPLACEMENT): ICD-10-CM

## 2023-10-31 DIAGNOSIS — I25.10 CORONARY ARTERY DISEASE INVOLVING NATIVE CORONARY ARTERY OF NATIVE HEART WITHOUT ANGINA PECTORIS: ICD-10-CM

## 2023-10-31 DIAGNOSIS — I45.10 RBBB: ICD-10-CM

## 2023-10-31 PROCEDURE — 3074F SYST BP LT 130 MM HG: CPT | Performed by: INTERNAL MEDICINE

## 2023-10-31 PROCEDURE — 99213 OFFICE O/P EST LOW 20 MIN: CPT | Performed by: INTERNAL MEDICINE

## 2023-10-31 PROCEDURE — 99214 OFFICE O/P EST MOD 30 MIN: CPT | Performed by: INTERNAL MEDICINE

## 2023-10-31 PROCEDURE — 3078F DIAST BP <80 MM HG: CPT | Performed by: INTERNAL MEDICINE

## 2023-10-31 ASSESSMENT — FIBROSIS 4 INDEX: FIB4 SCORE: 2.87

## 2023-10-31 NOTE — PROGRESS NOTES
Valve Program Functional Assessment:     KCCQ12   1a) Showering/bathing: 3  1b) Walking 1 block on ground: 3  1c) Hurrying or joggin  2) Swellin  3) Fatigue: 5  4) Shortness of breath: 7  5) Sleep sitting up: 5  6) Limited enjoyment of life: 5  7) Spend the rest of your life with HF: 5  8a) Hobbies, recreational activities:4  8b) Working or doing household chores:4  8c) Visiting family or friends: 5

## 2023-10-31 NOTE — PROGRESS NOTES
"CARDIOLOGY STRUCTURAL HEART FOLLOWUP    PCP: KINSEY Horner.  REFERRING: Darron Van MD    1. S/P TAVR (transcatheter aortic valve replacement)    2. Coronary artery disease involving native coronary artery of native heart without angina pectoris    3. RBBB        Marry Mathur has made excellent recovery following transcatheter aortic valve replacement.  I advise continuing warfarin for the first 3 months post TAVR and then will transition back to Xarelto.  Given her prior history with thrombosis during interruption of anticoagulation she will require a supervised transition with INRs.  Additionally, she is having lower extremity edema and excellent blood pressure control following relief of aortic stenosis.  I therefore stopped the 2.5 mg of amlodipine and encouraged her to continue tracking the home blood pressure to maintain a average level of less than 130/80.    Follow up: 6 months with Dr. Van, 1 year with myself    History: Marry Mathur is a 88 y.o. 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  She also has low back pain.    She completed TAVR on September 25 and has done well since.  She is not experiencing any shortness of breath or exertional fatigue.  She is primarily limited by low back discomfort and previously had successful interventional pain therapy but is delaying this due to present anticoagulation and not wanting to interrupt during the 3-month period which is appropriate.  Blood pressure has been well controlled      PE:  /64 (BP Location: Left arm, Patient Position: Sitting, BP Cuff Size: Adult)   Pulse 86   Resp 16   Ht 1.6 m (5' 3\")   Wt 71.2 kg (157 lb)   LMP  (LMP Unknown)   SpO2 95%   BMI 27.81 kg/m²   GEN: NAD  CARDIAC: Regular " "Systolic ejection murmur Normal S1, S2  RESP: Clear to auscultation bilaterally  ABD: Soft, non-tender, non-distended  EXT: Trace lower extremity edema  NEURO: No focal deficit    Past Medical History:   Diagnosis Date    Adenocarcinoma of colon (HCC) 10/30/2018    From sessile serrated polyp 10/2018    Anesthesia     pt states \"one new dr scraped my esophagus when they put the tube in and I started bleeding so they couldn't operate\"     Back pain 06/01/2022    0/10    Blood clotting disorder (HCC) 2012    clot in leg    Bowel habit changes     constipation     Breath shortness 06/26/2023    with exertion    Cancer (HCC)     skin, colon 2018    Cataract     belia IOL     Chronic back pain greater than 3 months duration     Deep vein blood clot of left lower extremity (HCC) 2022    Deep vein thrombosis (HCC) 03/08/2016    First occurrence in [facility] in late 1970s Second occurrence further up in [facility] in 2012, has been on AC since     Essential hypertension, benign 06/27/2012    GERD (gastroesophageal reflux disease) 06/27/2012    Heart murmur     High cholesterol     Hyperlipidemia     Hypertension     hx of, not currently     Impaired fasting glucose 11/02/2017    Melanoma (HCC)     Mild aortic stenosis     Seeing Dr. Van    Other and unspecified hyperlipidemia 06/27/2012    Prediabetes     Protein S deficiency ([facility]) 11/02/2017    Noted in lab work 2013 as a part of work up at [facility]     Rheumatoid arthritis involving multiple sites with positive rheumatoid factor ([facility]) 03/14/2016    Dr. Escoto    Rheumatoid nodule ([facility]) 07/25/2017    Right bundle branch block 06/27/2012    pt denies     Urinary incontinence 11/02/2017     Allergies   Allergen Reactions    Wound Dressing Adhesive      Pt says band-aids cause skin reaction/rash if on for more than 2 days  Paper tape OK       Outpatient Encounter Medications as of 10/31/2023   Medication Sig Dispense Refill    warfarin (COUMADIN) 2.5 MG Tab Take one-half to one " tablet by mouth daily or as directed by anticoagulation clinic 30 Tablet 3    losartan (COZAAR) 50 MG Tab Take 1 Tablet by mouth at bedtime. 90 Tablet 3    omeprazole (PRILOSEC) 20 MG delayed-release capsule Take 1 Capsule by mouth every morning. 90 Capsule 3    diphenhydrAMINE-APAP, sleep,  MG Tab Take 2 Tablets by mouth at bedtime.      lidocaine (LIDODERM) 5 % Patch APPLY 1 PATCH TO SKIN DAILY FOR 12 HOURS ON THEN 12 HOURS OFF      SODIUM FLUORIDE 5000 PLUS 1.1 % Cream BRUSH WITH PEA SIZEED AMOUNT ONCE A DAY PREFERABLY BEFORE BEDTIME. SPIT OUT ALL EXCESS. DO NOT RINSE      atorvastatin (LIPITOR) 20 MG Tab Take 1 Tablet by mouth every evening. 90 Tablet 3    gabapentin (NEURONTIN) 300 MG Cap Take 300 mg by mouth 3 times a day.      Multiple Vitamins-Minerals (PRESERVISION AREDS 2 PO) Take 1 Capsule by mouth 2 times a day.      Calcium Carbonate 600 MG Tab Take 600 mg by mouth every morning.      polyethylene glycol/lytes (MIRALAX) 17 g Pack Take 1 Packet by mouth 1 time a day as needed (if sennosides and docusate ineffective after 24 hours).  3    acetaminophen (TYLENOL) 500 MG Tab Take 650 mg by mouth 3 times a day as needed for Mild Pain. 1.5 in am, 1 tab at noon, 2 in pm  Indications: Pain      melatonin 5 mg Tab Take 10 mg by mouth every evening.      vitamin D (CHOLECALCIFEROL) 1000 UNIT Tab Take 1,000 Units by mouth every morning.      [DISCONTINUED] amLODIPine (NORVASC) 2.5 MG Tab Take 1 Tablet by mouth at bedtime. 90 Tablet 3     No facility-administered encounter medications on file as of 10/31/2023.     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      Social Determinants of Health     Financial Resource Strain: Not on file   Food Insecurity: Not on file   Transportation Needs: Not on file   Physical Activity: Not on file   Stress: Not on file   Social Connections: Not on file   Intimate Partner Violence: Not on file   Housing Stability: Not on file       Studies  Lab Results   Component Value Date/Time    CHOLSTRLTOT 158 10/23/2023 07:09 AM    LDL 84 10/23/2023 07:09 AM    HDL 57 10/23/2023 07:09 AM    TRIGLYCERIDE 83 10/23/2023 07:09 AM       Lab Results   Component Value Date/Time    SODIUM 136 09/26/2023 03:20 AM    POTASSIUM 4.0 09/26/2023 03:20 AM    CHLORIDE 102 09/26/2023 03:20 AM    CO2 23 09/26/2023 03:20 AM    GLUCOSE 136 (H) 09/26/2023 03:20 AM    BUN 23 (H) 09/26/2023 03:20 AM    CREATININE 0.79 09/26/2023 03:20 AM     Lab Results   Component Value Date/Time    ALKPHOSPHAT 63 09/26/2023 03:20 AM    ASTSGOT 19 09/26/2023 03:20 AM    ALTSGPT 13 09/26/2023 03:20 AM    TBILIRUBIN 0.4 09/26/2023 03:20 AM             No chief complaint on file.    ROS:   10 point review systems is otherwise negative except as per the HPI

## 2023-11-06 ENCOUNTER — ANTICOAGULATION VISIT (OUTPATIENT)
Dept: MEDICAL GROUP | Facility: PHYSICIAN GROUP | Age: 88
End: 2023-11-06
Payer: MEDICARE

## 2023-11-06 DIAGNOSIS — Z79.01 LONG TERM (CURRENT) USE OF ANTICOAGULANTS: Primary | ICD-10-CM

## 2023-11-06 LAB — INR PPP: 1.7 (ref 2–3.5)

## 2023-11-06 PROCEDURE — 99211 OFF/OP EST MAY X REQ PHY/QHP: CPT | Performed by: INTERNAL MEDICINE

## 2023-11-06 PROCEDURE — 85610 PROTHROMBIN TIME: CPT | Performed by: INTERNAL MEDICINE

## 2023-11-06 NOTE — PROGRESS NOTES
Anticoagulation Summary  As of 2023      INR goal:  2.0-3.0   TTR:  1.1 % (3.1 y)   INR used for dosin.70 (2023)   Warfarin maintenance plan:  1.25 mg (2.5 mg x 0.5) every Fri; 2.5 mg (2.5 mg x 1) all other days   Weekly warfarin total:  16.25 mg   Plan last modified:  Steve Hahn, PharmD (2023)   Next INR check:  2023   Target end date:  Indefinite    Indications    Long term (current) use of anticoagulants [Z79.01]  Deep vein thrombosis - recurrent  on chronic anticoagulation (Resolved) [I82.409]  Atrial fibrillation [I48.91] (Resolved) [I48.91]                 Anticoagulation Episode Summary       INR check location:      Preferred lab:      Send INR reminders to:      Comments:            Anticoagulation Care Providers       Provider Role Specialty Phone number    Davon Waters M.D. Referring Internal Medicine [phone]    [facility] Anticoagulation Services Responsible  [phone]          Anticoagulation Patient Findings  Patient Findings       Negatives:  Signs/symptoms of thrombosis, Signs/symptoms of bleeding, Laboratory test error suspected, Change in health, Change in alcohol use, Change in activity, Upcoming invasive procedure, Emergency department visit, Upcoming dental procedure, Missed doses, Extra doses, Change in medications, Change in diet/appetite, Hospital admission, Bruising, Other complaints                  HPI:   Marry Mathur seen in clinic today, on anticoagulation therapy with warfarin due to history of atrial fibrillation and DVT.     Patient's previous INR was subtherapeutic at 1.9 on 10-, at which time patient was instructed to increase warfarin regimen. Patient returns to clinic today to recheck INR to ensure it is therapeutic and thus preventing possible clotting and/or bleeding/bruising complications.     CHADS-VASc = 6  (unadjusted ischemic stroke risk/year:  9.2%, which is high risk)     Does patient have any changes to current  medical/health status since last appt: No  Does patient have any signs/symptoms of bleeding and/or thrombosis since the last appt: No  Does patient have any interval changes to diet or medications since last appt: No  Are there any complications or cost restrictions with current therapy: No     Does patient have [facility] PCP? Yes, CRISTINO Horner (If not, please document discussion that patient must be seen at [facility])       Vitals:  declined today    There were no vitals filed for this visit.     Asssessment:      INR remains subtherapeutic at 1.7, therefore increasing stroke risk   Reason(s) for out of range INR today:  dose too low      Pt is not on antiplatelet therapy    Medication reconciliation completed today.    Plan:  Instructed patient to bolus with 3.75mg X 1, then increase weekly warfarin regimen as detailed above.     Follow up:  Because warfarin is a high risk medication and current CHEST guidelines recommend regular monitoring intervals (few days up to 12 weeks), will have patient return to clinic in 1 weeks to recheck INR.    Steve Hahn, PharmD, BCACP

## 2023-11-13 ENCOUNTER — ANTICOAGULATION VISIT (OUTPATIENT)
Dept: MEDICAL GROUP | Facility: PHYSICIAN GROUP | Age: 88
End: 2023-11-13
Payer: MEDICARE

## 2023-11-13 DIAGNOSIS — Z79.01 LONG TERM (CURRENT) USE OF ANTICOAGULANTS: Primary | ICD-10-CM

## 2023-11-13 LAB — INR PPP: 1.7 (ref 2–3.5)

## 2023-11-13 PROCEDURE — 85610 PROTHROMBIN TIME: CPT | Performed by: INTERNAL MEDICINE

## 2023-11-13 PROCEDURE — 99211 OFF/OP EST MAY X REQ PHY/QHP: CPT | Performed by: INTERNAL MEDICINE

## 2023-11-13 RX ORDER — WARFARIN SODIUM 2.5 MG/1
TABLET ORAL
Qty: 45 TABLET | Refills: 1 | Status: SHIPPED | OUTPATIENT
Start: 2023-11-13 | End: 2023-12-15

## 2023-11-13 NOTE — ED PROVIDER NOTES
ED Provider Note    CHIEF COMPLAINT  Chief Complaint   Patient presents with   • Melena   • Diarrhea     3-4 days       HPI  Marry Mathur is a 86 y.o. female who presents stating that she has had diarrhea for 3 days, usually she is constipated and eats prunes in the morning every day.  She denies any travel any night states, she denies any recent antibiotic use.  She has had a colectomy for colon cancer previously.  The patient says that she took the generic form of Pepto-Bismol and then developed black stool.  It is black diarrhea.  She has had no vomiting, she denies any pain.    REVIEW OF SYSTEMS  See HPI for further details. All other systems are negative.     PAST MEDICAL HISTORY   has a past medical history of Adenocarcinoma of colon ([facility]) (10/30/2018), Anesthesia, Atrial fibrillation [I48.91] (4/19/2016), Back pain (6/27/2012), Blood clotting disorder ([facility]) (2012), Bowel habit changes, Cancer ([facility]), Cataract, Chronic back pain greater than 3 months duration, Deep vein thrombosis ([facility]) (3/8/2016), Essential hypertension, benign (6/27/2012), GERD (gastroesophageal reflux disease) (6/27/2012), Heart murmur, Hyperlipidemia, Hypertension, Impaired fasting glucose (11/2/2017), Mild aortic stenosis (11/2/2017), Other and unspecified hyperlipidemia (6/27/2012), Prediabetes, Protein S deficiency ([facility]) (11/2/2017), Rheumatoid arthritis involving multiple sites with positive rheumatoid factor ([facility]) (03/14/2016), Rheumatoid nodule ([facility]) (7/25/2017), Right bundle branch block (6/27/2012), and Urinary incontinence (11/2/2017).    SOCIAL HISTORY  Social History     Tobacco Use   • Smoking status: Never Smoker   • Smokeless tobacco: Never Used   Vaping Use   • Vaping Use: Never used   Substance and Sexual Activity   • Alcohol use: Not Currently     Alcohol/week: 0.0 oz     Comment: occ   • Drug use: No   • Sexual activity: Not Currently     Partners: Male     Comment:         SURGICAL HISTORY   has a past  Pt requesting to speak with someone to help set her up with Medicaid prior to her making an appt with OB/GYn  Secure chat message sent to  for assistance    "surgical history that includes hysterectomy, total abdominal (1970's); arthroplasty; cholecystectomy; other orthopedic surgery (2014); other orthopedic surgery (2011); inguinal hernia repair (Right, 9/23/2016); other (08/12/2014); rotator cuff repair (Bilateral); hemicolectomy (Right, 12/13/2018); irrigation & debridement ortho (Bilateral, 7/31/2020); and lumbar medial branch blocks (Bilateral, 12/15/2020).    CURRENT MEDICATIONS  Home Medications     Reviewed by Nabil Pandya (Pharmacy Tech) on 12/05/21 at 1628  Med List Status: Complete   Medication Last Dose Status   acetaminophen (TYLENOL) 500 MG Tab 12/5/2021 Active   atorvastatin (LIPITOR) 10 MG Tab 12/4/2021 Active   Calcium Carb-Cholecalciferol (CALCIUM 500 + D) 500-400 MG-UNIT Tab 12/4/2021 Active   Diphenhydramine-APAP, sleep, (TYLENOL PM EXTRA STRENGTH)  MG Tab 12/4/2021 Active   gabapentin (NEURONTIN) 300 MG Cap 12/4/2021 Active   hydroxychloroquine (PLAQUENIL) 200 MG Tab 12/5/2021 Active   lidocaine (LIDODERM) 5 % Patch 12/5/2021 Active   Melatonin 10 MG Tab 12/4/2021 Active   omeprazole (PRILOSEC) 20 MG delayed-release capsule 12/5/2021 Active   Potassium 99 MG Tab 12/5/2021 Active   pregabalin (LYRICA) 150 MG Cap 12/5/2021 Active   rivaroxaban (XARELTO) 20 MG Tab tablet 12/4/2021 Active   vitamin D (CHOLECALCIFEROL) 1000 UNIT Tab 12/5/2021 Active                ALLERGIES  Allergies   Allergen Reactions   • Sulfa Drugs Vomiting     RXN=young person       FAMILY HISTORY  No pertinent family history    PHYSICAL EXAM  VITAL SIGNS: BP (!) 176/67   Pulse 64   Temp 36.3 °C (97.4 °F) (Temporal)   Resp 16   Ht 1.6 m (5' 3\")   Wt 74 kg (163 lb 2.3 oz)   SpO2 93%   BMI 28.90 kg/m²  @MAHI[[id number]::@   Pulse ox interpretation: I interpret this pulse ox as normal.  Constitutional: Alert in no apparent distress.  HENT: No signs of trauma, Bilateral external ears normal, Nose normal.   Eyes: Pupils are equal and reactive, Conjunctiva normal, " Non-icteric.   Neck: Normal range of motion, No tenderness, Supple, No stridor.   Lymphatic: No lymphadenopathy noted.   Cardiovascular: Regular rate and rhythm, no murmurs.   Thorax & Lungs: Normal breath sounds, No respiratory distress, No wheezing, No chest tenderness.   Abdomen: Bowel sounds normal, Soft, No tenderness, No masses, No pulsatile masses. No peritoneal signs.  Skin: Warm, Dry, No erythema, No rash.   Back: No bony tenderness, No CVA tenderness.   Extremities: Intact distal pulses, No edema, No tenderness, No cyanosis.  Musculoskeletal: Good range of motion in all major joints. No tenderness to palpation or major deformities noted.   Neurologic: Alert , Normal motor function, Normal sensory function, No focal deficits noted.   Psychiatric: Affect normal, Judgment normal, Mood normal.       DIAGNOSTIC STUDIES / PROCEDURES        LABS  Labs Reviewed   CBC WITH DIFFERENTIAL - Abnormal; Notable for the following components:       Result Value    Lymphocytes 18.10 (*)     All other components within normal limits    Narrative:     Collected By:  Indicate which anticoagulants the patient is on:->UNKNOWN   PROTHROMBIN TIME - Abnormal; Notable for the following components:    PT 16.8 (*)     INR 1.40 (*)     All other components within normal limits    Narrative:     Collected By:  Indicate which anticoagulants the patient is on:->UNKNOWN   COD (ADULT)    Narrative:     Collected By:   COMP METABOLIC PANEL    Narrative:     Collected By:  Indicate which anticoagulants the patient is on:->UNKNOWN   LIPASE    Narrative:     Collected By:  Indicate which anticoagulants the patient is on:->UNKNOWN   APTT    Narrative:     Collected By:  Indicate which anticoagulants the patient is on:->UNKNOWN   ESTIMATED GFR    Narrative:     Collected By:  Indicate which anticoagulants the patient is on:->UNKNOWN   SARS-COV ANTIGEN MURIEL         The patient's stool was placed on the guaiac card and it is guaiac  negative.    RADIOLOGY  DX-CHEST-PORTABLE (1 VIEW)   Final Result      Enlarged cardiac silhouette without evidence of acute disease.              COURSE & MEDICAL DECISION MAKING  Pertinent Labs & Imaging studies reviewed. (See chart for details)    The patient presents with black stool, first diarrhea then black stool.  Is not clear whether she is having melena or side effects from generic Pepto-Bismol.    The patient's stool is guaiac negative, it is black.  This is likely from generic Pepto-Bismol.    Her H&H is normal.  Her BUN is normal.    At this time the patient be discharged if her symptoms continue for more than 2 weeks I would like her to have her stool tested as an outpatient.  I have written an outpatient lab slip.  She will take Imodium over-the-counter for diarrhea symptoms.  She will not prepare other people's food and knows that this can be contagious.    The patient will return for new or worsening symptoms and is stable at the time of discharge.    The patient is referred to a primary physician for blood pressure management, diabetic screening, and for all other preventative health concerns.        DISPOSITION:  Patient will be discharged home in stable condition.    FOLLOW UP:  [facility], Emergency Dept  [address] [address] [phone]    If symptoms worsen    Saskia Cavazos, KARENP.R.N.  [address] [place] [id number] [phone]      As needed          If you have diarrhea for more than 2 weeks go to the lab for collection instructions and have your stool tested      OUTPATIENT MEDICATIONS:  New Prescriptions    No medications on file      The patient will return for worsening symptoms and is stable at the time of discharge. The patient verbalizes understanding and will comply.    FINAL IMPRESSION  1. Diarrhea, unspecified type     2.      Guaiac negative black diarrhea while taking generic Pepto-Bismol         Electronically signed by:  Omar Dunbar M.D., 12/5/2021 3:32 PM

## 2023-11-13 NOTE — PROGRESS NOTES
Anticoagulation Summary  As of 2023      INR goal:  2.0-3.0   TTR:  1.1 % (3.2 y)   INR used for dosin.70 (2023)   Warfarin maintenance plan:  3.75 mg (2.5 mg x 1.5) every Mon, Fri; 2.5 mg (2.5 mg x 1) all other days   Weekly warfarin total:  20 mg   Plan last modified:  Steve Hahn, PharmD (2023)   Next INR check:  2023   Target end date:  Indefinite    Indications    Long term (current) use of anticoagulants [Z79.01]  Deep vein thrombosis - recurrent  on chronic anticoagulation (Resolved) [I82.409]  Atrial fibrillation [I48.91] (Resolved) [I48.91]                 Anticoagulation Episode Summary       INR check location:      Preferred lab:      Send INR reminders to:      Comments:            Anticoagulation Care Providers       Provider Role Specialty Phone number    Davon Waters M.D. Referring Internal Medicine [phone]    [facility] Anticoagulation Services Responsible  [phone]          Anticoagulation Patient Findings  Patient Findings       Negatives:  Signs/symptoms of thrombosis, Signs/symptoms of bleeding, Laboratory test error suspected, Change in health, Change in alcohol use, Change in activity, Upcoming invasive procedure, Emergency department visit, Upcoming dental procedure, Missed doses, Extra doses, Change in medications, Change in diet/appetite, Hospital admission, Bruising, Other complaints                  HPI:   Marry Mathur seen in clinic today, on anticoagulation therapy with warfarin due to history of DVT, atrial fibrillation, and TAVR.    Patient's previous INR was subtherapeutic at 1.7 on 23, at which time patient was instructed to bolus with one dose, then increase weekly warfarin regimen.  She returns to clinic today to recheck INR to ensure it is therapeutic and thus preventing possible clotting and/or bleeding/bruising complications.    CHADS-VASc = at least 6  (unadjusted ischemic stroke risk/year:  9.2%, which is high  risk)    Does patient have any changes to current medical/health status since last appt (Y/N):  NO  Does patient have any signs/symptoms of bleeding and/or thrombosis since the last appt (Y/N):  NO  Does patient have any interval changes to diet or medications since last appt (Y/N):  NO  Are there any complications or cost restrictions with current therapy (Y/N):  NO     Does patient have [facility] PCP? Yes, CRISTINO Horner (If not, please document discussion that patient must be seen at [facility])       Vitals:  declined today    There were no vitals filed for this visit.     Asssessment:      INR remains subtherapeutic at 1.7, therefore increasing stroke risk.   Reason(s) for out of range INR today:  dose too low.      Pt is not on antiplatelet therapy    Medication reconciliation completed today.    Plan:  Instructed patient to increase weekly warfarin regimen as detailed above.      Follow up:  Because warfarin is a high risk medication and current CHEST guidelines recommend regular monitoring intervals (few days up to 12 weeks), will have patient return to clinic in 1 weeks to recheck INR.    Steve Hahn, PharmD, BCACP

## 2023-11-14 ENCOUNTER — PATIENT MESSAGE (OUTPATIENT)
Dept: CARDIOLOGY | Facility: MEDICAL CENTER | Age: 88
End: 2023-11-14
Payer: MEDICARE

## 2023-11-20 ENCOUNTER — ANTICOAGULATION VISIT (OUTPATIENT)
Dept: MEDICAL GROUP | Facility: PHYSICIAN GROUP | Age: 88
End: 2023-11-20
Payer: MEDICARE

## 2023-11-20 DIAGNOSIS — Z79.01 LONG TERM (CURRENT) USE OF ANTICOAGULANTS: Primary | ICD-10-CM

## 2023-11-20 LAB — INR PPP: 2.5 (ref 2–3.5)

## 2023-11-20 PROCEDURE — 85610 PROTHROMBIN TIME: CPT | Performed by: INTERNAL MEDICINE

## 2023-11-20 PROCEDURE — 99999 PR NO CHARGE: CPT | Performed by: INTERNAL MEDICINE

## 2023-11-20 PROCEDURE — 93793 ANTICOAG MGMT PT WARFARIN: CPT | Performed by: INTERNAL MEDICINE

## 2023-11-20 NOTE — PROGRESS NOTES
Anticoagulation Summary  As of 2023      INR goal:  2.0-3.0   TTR:  1.5 % (3.2 y)   INR used for dosin.50 (2023)   Warfarin maintenance plan:  3.75 mg (2.5 mg x 1.5) every Mon, Fri; 2.5 mg (2.5 mg x 1) all other days   Weekly warfarin total:  20 mg   Plan last modified:  Steve Hahn, PharmD (2023)   Next INR check:  2023   Target end date:  Indefinite    Indications    Long term (current) use of anticoagulants [Z79.01]  Deep vein thrombosis - recurrent  on chronic anticoagulation (Resolved) [I82.409]  Atrial fibrillation [I48.91] (Resolved) [I48.91]                 Anticoagulation Episode Summary       INR check location:      Preferred lab:      Send INR reminders to:      Comments:            Anticoagulation Care Providers       Provider Role Specialty Phone number    Davon Waters M.D. Referring Internal Medicine [phone]    [facility] Anticoagulation Services Responsible  [phone]          Anticoagulation Patient Findings  Patient Findings       Negatives:  Signs/symptoms of thrombosis, Signs/symptoms of bleeding, Laboratory test error suspected, Change in health, Change in alcohol use, Change in activity, Upcoming invasive procedure, Emergency department visit, Upcoming dental procedure, Missed doses, Extra doses, Change in medications, Change in diet/appetite, Hospital admission, Bruising, Other complaints                  HPI:   Marry Mathur seen in clinic today, on anticoagulation therapy with warfarin due to history of DVT, atrial fibrillation, and TAVR.     Patient's previous INR was subtherapeutic at 1.7 on 23, at which time patient was instructed to bolus with one dose, then increase weekly warfarin regimen.  She returns to clinic today to recheck INR to ensure it is therapeutic and thus preventing possible clotting and/or bleeding/bruising complications.    CHADS-VASc = at least 6  (unadjusted ischemic stroke risk/year:  9.2%, which is high  risk)    Does patient have any changes to current medical/health status since last appt (Y/N):  NO  Does patient have any signs/symptoms of bleeding and/or thrombosis since the last appt (Y/N):  NO  Does patient have any interval changes to diet or medications since last appt (Y/N):  NO  Are there any complications or cost restrictions with current therapy (Y/N):  NO     Does patient have [facility] PCP? Yes, CRISTINO Horner (If not, please document discussion that patient must be seen at [facility])       Vitals:  declined today    There were no vitals filed for this visit.     Asssessment:      INR therapeutic at 2.5, therefore decreasing stroke and/or bleeding risk.   Reason(s) for out of range INR today:  n/a      Pt is not on antiplatelet therapy    Medication reconciliation completed today.    Plan:  Pt is to continue with current warfarin dosing regimen.     Follow up:  Because warfarin is a high risk medication and current CHEST guidelines recommend regular monitoring intervals (few days up to 12 weeks), will have patient return to clinic in 1.5 weeks to recheck INR.    Steve Hahn, PharmD, BCACP

## 2023-11-29 ENCOUNTER — PATIENT MESSAGE (OUTPATIENT)
Dept: HEALTH INFORMATION MANAGEMENT | Facility: OTHER | Age: 88
End: 2023-11-29

## 2023-12-01 ENCOUNTER — ANTICOAGULATION VISIT (OUTPATIENT)
Dept: MEDICAL GROUP | Facility: PHYSICIAN GROUP | Age: 88
End: 2023-12-01
Payer: MEDICARE

## 2023-12-01 DIAGNOSIS — Z79.01 LONG TERM (CURRENT) USE OF ANTICOAGULANTS: ICD-10-CM

## 2023-12-01 LAB — INR PPP: 3.2 (ref 2–3.5)

## 2023-12-01 PROCEDURE — 85610 PROTHROMBIN TIME: CPT | Performed by: FAMILY MEDICINE

## 2023-12-01 PROCEDURE — 99211 OFF/OP EST MAY X REQ PHY/QHP: CPT | Performed by: FAMILY MEDICINE

## 2023-12-01 NOTE — PROGRESS NOTES
Anticoagulation Summary  As of 12/1/2023      INR goal:  2.0-3.0   TTR:  2.2 % (3.2 y)   INR used for dosing:  3.20 (12/1/2023)   Warfarin maintenance plan:  3.75 mg (2.5 mg x 1.5) every Mon; 2.5 mg (2.5 mg x 1) all other days   Weekly warfarin total:  18.75 mg   Plan last modified:  Steve Hahn, PharmD (12/1/2023)   Next INR check:  12/15/2023   Target end date:  Indefinite    Indications    Long term (current) use of anticoagulants [Z79.01]  Deep vein thrombosis - recurrent  on chronic anticoagulation (Resolved) [I82.409]  Atrial fibrillation [I48.91] (Resolved) [I48.91]                 Anticoagulation Episode Summary       INR check location:      Preferred lab:      Send INR reminders to:      Comments:            Anticoagulation Care Providers       Provider Role Specialty Phone number    Davon Waters M.D. Referring Internal Medicine [phone]    [facility] Anticoagulation Services Responsible  [phone]          Anticoagulation Patient Findings  Patient Findings       Negatives:  Signs/symptoms of thrombosis, Signs/symptoms of bleeding, Laboratory test error suspected, Change in health, Change in alcohol use, Change in activity, Upcoming invasive procedure, Emergency department visit, Upcoming dental procedure, Missed doses, Extra doses, Change in medications, Change in diet/appetite, Hospital admission, Bruising, Other complaints                  HPI:   Marry Mathur seen in clinic today, on anticoagulation therapy with warfarin due to history of AF and DVT.    Patient's previous INR was therapeutic at 2.5 on 11-20-23, at which time patient was instructed to continue with current warfarin regimen.  She returns to clinic today to recheck INR to ensure it is therapeutic and thus preventing possible clotting and/or bleeding/bruising complications.    CHADS-VASc = at least 6  (unadjusted ischemic stroke risk/year:  9.2%, which is high risk)    Does patient have any changes to current  medical/health status since last appt (Y/N):  NO  Does patient have any signs/symptoms of bleeding and/or thrombosis since the last appt (Y/N):  NO  Does patient have any interval changes to diet or medications since last appt (Y/N):  NO  Are there any complications or cost restrictions with current therapy (Y/N):  NO     Does patient have [facility] PCP? Yes, CRISTINO Horner (If not, please document discussion that patient must be seen at [facility])       Vitals:  declined today    There were no vitals filed for this visit.     Asssessment:      INR supratherapeutic at 3.2, therefore increasing bleeding risk   Reason(s) for out of range INR today:  dose too high      Pt is not on antiplatelet therapy    Medication reconciliation completed today.    Plan:  Instructed patient to decrease weekly warfarin regimen as detailed above.     Follow up:  Because warfarin is a high risk medication and current CHEST guidelines recommend regular monitoring intervals (few days up to 12 weeks), will have patient return to clinic in 2 weeks to recheck INR.    Steve Hahn, PharmD, BCACP

## 2023-12-12 ENCOUNTER — APPOINTMENT (RX ONLY)
Dept: URBAN - METROPOLITAN AREA CLINIC 22 | Facility: CLINIC | Age: 88
Setting detail: DERMATOLOGY
End: 2023-12-12

## 2023-12-12 DIAGNOSIS — Z86.006 PERSONAL HISTORY OF MELANOMA IN-SITU: ICD-10-CM

## 2023-12-12 DIAGNOSIS — L82.1 OTHER SEBORRHEIC KERATOSIS: ICD-10-CM

## 2023-12-12 DIAGNOSIS — Z71.89 OTHER SPECIFIED COUNSELING: ICD-10-CM

## 2023-12-12 DIAGNOSIS — D18.0 HEMANGIOMA: ICD-10-CM

## 2023-12-12 DIAGNOSIS — L57.0 ACTINIC KERATOSIS: ICD-10-CM

## 2023-12-12 DIAGNOSIS — L72.0 EPIDERMAL CYST: ICD-10-CM

## 2023-12-12 DIAGNOSIS — Z85.820 PERSONAL HISTORY OF MALIGNANT MELANOMA OF SKIN: ICD-10-CM

## 2023-12-12 DIAGNOSIS — D22 MELANOCYTIC NEVI: ICD-10-CM

## 2023-12-12 DIAGNOSIS — L81.4 OTHER MELANIN HYPERPIGMENTATION: ICD-10-CM

## 2023-12-12 DIAGNOSIS — Z85.828 PERSONAL HISTORY OF OTHER MALIGNANT NEOPLASM OF SKIN: ICD-10-CM

## 2023-12-12 PROBLEM — D18.01 HEMANGIOMA OF SKIN AND SUBCUTANEOUS TISSUE: Status: ACTIVE | Noted: 2023-12-12

## 2023-12-12 PROBLEM — D22.5 MELANOCYTIC NEVI OF TRUNK: Status: ACTIVE | Noted: 2023-12-12

## 2023-12-12 PROCEDURE — 17003 DESTRUCT PREMALG LES 2-14: CPT

## 2023-12-12 PROCEDURE — ? SUNSCREEN RECOMMENDATIONS

## 2023-12-12 PROCEDURE — ? COUNSELING

## 2023-12-12 PROCEDURE — ? PHOTO-DOCUMENTATION

## 2023-12-12 PROCEDURE — ? LIQUID NITROGEN

## 2023-12-12 PROCEDURE — 99213 OFFICE O/P EST LOW 20 MIN: CPT | Mod: 25

## 2023-12-12 PROCEDURE — ? ADDITIONAL NOTES

## 2023-12-12 PROCEDURE — 17000 DESTRUCT PREMALG LESION: CPT

## 2023-12-12 ASSESSMENT — LOCATION DETAILED DESCRIPTION DERM
LOCATION DETAILED: LEFT DORSAL THUMB METACARPOPHALANGEAL JOINT
LOCATION DETAILED: RIGHT MEDIAL CANTHUS
LOCATION DETAILED: LEFT MEDIAL ZYGOMA
LOCATION DETAILED: LEFT LATERAL SUBMANDIBULAR CHEEK
LOCATION DETAILED: RIGHT CLAVICULAR SKIN
LOCATION DETAILED: RIGHT SUPERIOR MEDIAL FOREHEAD
LOCATION DETAILED: LEFT LATERAL ABDOMEN
LOCATION DETAILED: RIGHT MEDIAL MALAR CHEEK
LOCATION DETAILED: RIGHT DISTAL DORSAL FOREARM
LOCATION DETAILED: LEFT PROXIMAL DORSAL FOREARM
LOCATION DETAILED: LEFT INFERIOR UPPER BACK
LOCATION DETAILED: RIGHT MID-UPPER BACK
LOCATION DETAILED: NASAL DORSUM
LOCATION DETAILED: RIGHT DISTAL PRETIBIAL REGION
LOCATION DETAILED: LEFT LATERAL SUPERIOR CHEST
LOCATION DETAILED: LEFT LATERAL MALAR CHEEK
LOCATION DETAILED: LEFT SUPERIOR FOREHEAD
LOCATION DETAILED: LEFT RADIAL DORSAL HAND
LOCATION DETAILED: RIGHT MID PREAURICULAR CHEEK
LOCATION DETAILED: UPPER STERNUM
LOCATION DETAILED: RIGHT LATERAL SUPERIOR CHEST
LOCATION DETAILED: LEFT LATERAL EYEBROW
LOCATION DETAILED: LEFT INFERIOR HELIX
LOCATION DETAILED: POSTERIOR MID-PARIETAL SCALP
LOCATION DETAILED: LEFT DISTAL PRETIBIAL REGION
LOCATION DETAILED: LEFT CENTRAL POSTAURICULAR SKIN
LOCATION DETAILED: RIGHT PROXIMAL LATERAL POSTERIOR UPPER ARM
LOCATION DETAILED: RIGHT MEDIAL ZYGOMA
LOCATION DETAILED: RIGHT NASAL ALA
LOCATION DETAILED: LEFT INFERIOR LATERAL FOREHEAD
LOCATION DETAILED: RIGHT CENTRAL LATERAL NECK
LOCATION DETAILED: LEFT SUPERIOR PARIETAL SCALP
LOCATION DETAILED: NASAL SUPRATIP
LOCATION DETAILED: RIGHT RADIAL DORSAL HAND
LOCATION DETAILED: LEFT CENTRAL BUCCAL CHEEK
LOCATION DETAILED: RIGHT UPPER CUTANEOUS LIP

## 2023-12-12 ASSESSMENT — LOCATION SIMPLE DESCRIPTION DERM
LOCATION SIMPLE: RIGHT HAND
LOCATION SIMPLE: LEFT UPPER BACK
LOCATION SIMPLE: LEFT EYEBROW
LOCATION SIMPLE: RIGHT CLAVICULAR SKIN
LOCATION SIMPLE: LEFT FOREARM
LOCATION SIMPLE: CHEST
LOCATION SIMPLE: POSTERIOR SCALP
LOCATION SIMPLE: RIGHT FOREHEAD
LOCATION SIMPLE: LEFT ZYGOMA
LOCATION SIMPLE: LEFT FOREHEAD
LOCATION SIMPLE: LEFT PRETIBIAL REGION
LOCATION SIMPLE: RIGHT FOREARM
LOCATION SIMPLE: RIGHT PRETIBIAL REGION
LOCATION SIMPLE: LEFT EAR
LOCATION SIMPLE: RIGHT NOSE
LOCATION SIMPLE: RIGHT CHEEK
LOCATION SIMPLE: RIGHT POSTERIOR UPPER ARM
LOCATION SIMPLE: NECK
LOCATION SIMPLE: RIGHT LIP
LOCATION SIMPLE: LEFT HAND
LOCATION SIMPLE: LEFT CHEEK
LOCATION SIMPLE: ABDOMEN
LOCATION SIMPLE: RIGHT EYELID
LOCATION SIMPLE: RIGHT ZYGOMA
LOCATION SIMPLE: RIGHT UPPER BACK
LOCATION SIMPLE: SCALP
LOCATION SIMPLE: NOSE

## 2023-12-12 ASSESSMENT — LOCATION ZONE DERM
LOCATION ZONE: FACE
LOCATION ZONE: HAND
LOCATION ZONE: LEG
LOCATION ZONE: ARM
LOCATION ZONE: TRUNK
LOCATION ZONE: NOSE
LOCATION ZONE: LIP
LOCATION ZONE: SCALP
LOCATION ZONE: NECK
LOCATION ZONE: EAR
LOCATION ZONE: EYELID

## 2023-12-12 NOTE — HPI: MELANOMA F/U (HISTORY OF MALIGNANT MELANOMA)
What Is The Reason For Today's Visit?: History of Melanoma
Breslow Depth?: 1.1 mm
Year Excised?: 2018,2022

## 2023-12-12 NOTE — PROCEDURE: PHOTO-DOCUMENTATION
Photo Preface (Leave Blank If You Do Not Want): Photographs were obtained today
Details (Free Text): -left cheek, has been there for a few years.   Updated photo today
Detail Level: Zone

## 2023-12-12 NOTE — PROCEDURE: LIQUID NITROGEN
Number Of Freeze-Thaw Cycles: 2 freeze-thaw cycles
Duration Of Freeze Thaw-Cycle (Seconds): 3
Show Applicator Variable?: Yes
Render Note In Bullet Format When Appropriate: No
Detail Level: Detailed
Post-Care Instructions: I reviewed with the patient in detail post-care instructions. Patient is to wear sunprotection, and avoid picking at any of the treated lesions. Pt may apply Vaseline to crusted or scabbing areas.
Consent: The patient's consent was obtained including but not limited to risks of crusting, scabbing, blistering, scarring, darker or lighter pigmentary change, recurrence, incomplete removal and infection.

## 2023-12-15 ENCOUNTER — ANTICOAGULATION VISIT (OUTPATIENT)
Dept: MEDICAL GROUP | Facility: PHYSICIAN GROUP | Age: 88
End: 2023-12-15
Payer: MEDICARE

## 2023-12-15 DIAGNOSIS — Z79.01 LONG TERM (CURRENT) USE OF ANTICOAGULANTS: Primary | ICD-10-CM

## 2023-12-15 LAB — INR PPP: 1.3 (ref 2–3.5)

## 2023-12-15 PROCEDURE — 85610 PROTHROMBIN TIME: CPT | Performed by: INTERNAL MEDICINE

## 2023-12-15 PROCEDURE — 99211 OFF/OP EST MAY X REQ PHY/QHP: CPT | Performed by: INTERNAL MEDICINE

## 2023-12-15 NOTE — PROGRESS NOTES
Anticoagulation Summary  As of 12/15/2023      INR goal:     TTR:  2.8 % (3.2 y)   Prior goal:  2.0-3.0   INR used for dosin.30 (12/15/2023)   Warfarin maintenance plan:  No maintenance plan   Plan last modified:  Steve Hahn, PharmD (2023)   Next INR check:  3/8/2024   Target end date:  Indefinite    Indications    Long term (current) use of anticoagulants [Z79.01]  Deep vein thrombosis - recurrent  on chronic anticoagulation (Resolved) [I82.409]  Atrial fibrillation [I48.91] (Resolved) [I48.91]                 Anticoagulation Episode Summary       INR check location:      Preferred lab:      Send INR reminders to:      Comments:            Anticoagulation Care Providers       Provider Role Specialty Phone number    Davon Waters M.D. Referring Internal Medicine [phone]    [facility] Anticoagulation Services Responsible  [phone]          Anticoagulation Patient Findings  Patient Findings       Positives:  Missed doses    Negatives:  Signs/symptoms of thrombosis, Signs/symptoms of bleeding, Laboratory test error suspected, Change in health, Change in alcohol use, Change in activity, Upcoming invasive procedure, Emergency department visit, Upcoming dental procedure, Extra doses, Change in medications, Change in diet/appetite, Hospital admission, Bruising, Other complaints                  HPI:   Marry Tubbs Kareen seen in clinic today, on anticoagulation therapy with warfarin due to history of DVT and AF.    Patient's previous INR was supratherapeutic at 3.2 on 23, at which time patient was instructed to decrease weekly warfarin regimen.  She returns to clinic today to recheck INR to ensure it is therapeutic and thus preventing possible clotting and/or bleeding/bruising complications.    CHADS-VASc = at least 6  (unadjusted ischemic stroke risk/year:  9.2%, which is high risk)    Does patient have any changes to current medical/health status since last appt (Y/N):  NO  Does patient have  any signs/symptoms of bleeding and/or thrombosis since the last appt (Y/N):  NO  Does patient have any interval changes to diet or medications since last appt (Y/N):  missed dose(s) last week  Are there any complications or cost restrictions with current therapy (Y/N):  NO     Does patient have [facility] PCP? Yes, CRISTINO Horner (If not, please document discussion that patient must be seen at [facility])       Vitals:  declined today    There were no vitals filed for this visit.     Asssessment:      INR subtherapeutic at 1.3, therefore increasing stroke and VTE risk   Reason(s) for out of range INR today:  missed doses.      Pt is not on antiplatelet therapy    Medication reconciliation completed today.    Plan:    Per latest cardiology note from Dr Serrano on 10-31-23:  I advise continuing warfarin for the first 3 months post TAVR and then will transition back to Xarelto      3 month date would be 12-25-23, but she will be out of town and no way to check INR to determine if appropriate for transition.  Given subtherapeutic INR today, will have patient transition back to Xarelto a few days ahead of schedule.  Patient has taken Xarelto in the past, but provided brief re-education on MOA, adverse effects, and routine monitoring.    Follow up:  Three months    Steve Hahn, PharmD, BCACP

## 2023-12-28 ENCOUNTER — OFFICE VISIT (OUTPATIENT)
Dept: RHEUMATOLOGY | Facility: MEDICAL CENTER | Age: 88
End: 2023-12-28
Attending: INTERNAL MEDICINE
Payer: MEDICARE

## 2023-12-28 VITALS
HEART RATE: 65 BPM | WEIGHT: 161 LBS | BODY MASS INDEX: 28.52 KG/M2 | RESPIRATION RATE: 14 BRPM | DIASTOLIC BLOOD PRESSURE: 68 MMHG | OXYGEN SATURATION: 97 % | SYSTOLIC BLOOD PRESSURE: 130 MMHG | TEMPERATURE: 97.8 F

## 2023-12-28 DIAGNOSIS — G62.9 NEUROPATHY: ICD-10-CM

## 2023-12-28 DIAGNOSIS — I35.0 AORTIC VALVE STENOSIS, ETIOLOGY OF CARDIAC VALVE DISEASE UNSPECIFIED: ICD-10-CM

## 2023-12-28 DIAGNOSIS — I10 ESSENTIAL HYPERTENSION, BENIGN: ICD-10-CM

## 2023-12-28 DIAGNOSIS — M15.9 PRIMARY OSTEOARTHRITIS INVOLVING MULTIPLE JOINTS: ICD-10-CM

## 2023-12-28 DIAGNOSIS — H35.30 MACULAR DEGENERATION OF BOTH EYES, UNSPECIFIED TYPE: ICD-10-CM

## 2023-12-28 DIAGNOSIS — Z79.01 CHRONIC ANTICOAGULATION: ICD-10-CM

## 2023-12-28 DIAGNOSIS — C43.9 MALIGNANT MELANOMA, UNSPECIFIED SITE (HCC): ICD-10-CM

## 2023-12-28 DIAGNOSIS — M81.0 OSTEOPOROSIS WITHOUT CURRENT PATHOLOGICAL FRACTURE, UNSPECIFIED OSTEOPOROSIS TYPE: ICD-10-CM

## 2023-12-28 DIAGNOSIS — D68.59 PROTEIN S DEFICIENCY (HCC): ICD-10-CM

## 2023-12-28 PROCEDURE — 99214 OFFICE O/P EST MOD 30 MIN: CPT | Performed by: INTERNAL MEDICINE

## 2023-12-28 PROCEDURE — 3078F DIAST BP <80 MM HG: CPT | Performed by: INTERNAL MEDICINE

## 2023-12-28 PROCEDURE — 99212 OFFICE O/P EST SF 10 MIN: CPT

## 2023-12-28 PROCEDURE — 3075F SYST BP GE 130 - 139MM HG: CPT | Performed by: INTERNAL MEDICINE

## 2023-12-28 ASSESSMENT — FIBROSIS 4 INDEX: FIB4 SCORE: 2.87

## 2023-12-28 NOTE — PROGRESS NOTES
Chief Complaint- joint pain     Subjective:   Marry Mathur is a 88 y.o. female here today for follow up of rheumatological issues    This is a follow-up visit for this patient who we see in this clinic for osteoarthritis.  Patient has remote diagnosis of rheumatoid arthritis in the past the patient has been consistently negative in her serologies and negative x-rays.  Patient had been on Plaquenil but that was stopped because of macular degeneration and fear of further complications with the Plaquenil.  Patient currently only takes Tylenol as needed.    Complicating factor is that patient's just been diagnosed with metastatic melanoma with positive lymph nodes from the left axilla.  Patient currently undergoing evaluation for immunotherapy treatment.      Other issues include the development of leg length discrepancy status post left CARLYLE with left leg longer than the right.  Patient just recently got shoes to help adjust her leg length.      Other issues include a finding of osteoporosis on patient's bone density scan from March 2018, patient was not able to tolerate Fosamax because of a history of Araya's esophagus, patient currently on Prolia 60 mg subcu every 6 months with benefit.        Additional comorbidities include diabetes for which patient  takes metformin, also with a diagnosis of spinal stenosis with intermittent weakness in her legs with progressive numbness and weakness in the left leg.  Patient also with protein S deficiency with a history of DVT on chronic anticoagulation.      Patient also with moderate aortic stenosis followed by cardiology, this year underwent a bovine valve replacement.     S/p NSAIDS-unable to take because of chronic anticoagulation     Left TKA   Left CARLYLE     S/p plaquenil-stopped for fear of further complications in this patient with macular degeneration  S/p Remicade-stopped because of hx of melanoma about 1996 and has multiple other skin cancers  S/p  Orencia-lost efficacy  S/p Humira-stopped because of recurrence of melanoma October 2017  S/p MTX-stopped because of development of skin cancer/melanoma October 2017  S/p NSAIDS-relatively contraindicated as patient is on chronic anticoagulation   S/p xeljanz-stopped 2018 because of the development of colonic adenocarcinoma  S/p fosamax-unable to tolerate-GI upset      G6PD 13.6 adequate 5/2019  DEXA 3/18/2016 T scores 1.1, -1.1   FRAX 3/18/2016 major osteoporotic fracture risk 14.3%, hip fracture risks 3.7%  DEXA 3/23/2018 T scores 0.2, -1.4  FRAX 3/23/2018 major osteoporotic fracture risk 19.3%, hip fracture risk 6.1%  DEXA 6/2/2020 T scores 0.7, -1.4  FRAX 6/2/2020 major osteoporotic fracture risk 17.2%, hip fracture risk 4.9%  DEXA 8/11/2022 T score 1.2 -1.1  FRAX 8/11/2022  major osteoporotic fracture risk 15.4%, hip fracture risk 4.2%     Prolia started 6/2019     Echocardiogram 7/2012 normal [facility]  Echocardiogram 4/2021-CONCLUSIONS  Compared to the images of the study done 04- there has been no   significant change.  Left ventricular ejection fraction is visually estimated to be 65%.  Moderate aortic stenosis.  Unable to estimate pulmonary artery pressure due to an inadequate   tricuspid regurgitant jet.  Echocardiogram 5/2022-CONCLUSIONS  The left ventricular ejection fraction is visually estimated to be 60-  65%.  Indeterminate diastolic function due to significant mitral   calcification.  Normal right ventricular size and systolic function.  Estimated right ventricular systolic pressure is 35 mmHg.  Mildly dilated left atrium.  Moderate mitral annular calcification without significant mitral   stenosis.  Moderate to severe aortic valve stenosis by Doppler assessment,   although visually the aortic valve appears at most moderately stenotic   visually.  Transvalvular gradients are - Peak: 59 mmHg, Mean:  33 mmHg.  Vmax is 3.8 m/s. DVI is 0.22.  Normal inferior vena cava size  and inspiratory collapse.        RF neg 3/2014 [facility]; RF neg 6/2014 [facility]; RF neg 6/2016  CCP neg 3/2014 [facility]; CCP neg 6/2014 [facility]; CCP neg 6/2016  Uric acid 4.7 3/2014 [facility];Uric acid 4.5 6/2016  Hep B neg 6/2016  Quantiferon Gold neg 6/2016     Hand x-rays 3/2016-indicate osteoarthritis     Feet x-rays 3/2016-indicate osteoarthritis         Corticosteroid Therapy Informed Consent signed 1/17/2019-copy given to patient           Current Outpatient Medications   Medication Sig Dispense Refill    rivaroxaban (XARELTO) 20 MG Tab tablet Take 20 mg by mouth with dinner.      losartan (COZAAR) 50 MG Tab Take 1 Tablet by mouth at bedtime. 90 Tablet 3    omeprazole (PRILOSEC) 20 MG delayed-release capsule Take 1 Capsule by mouth every morning. 90 Capsule 3    diphenhydrAMINE-APAP, sleep,  MG Tab Take 2 Tablets by mouth at bedtime.      lidocaine (LIDODERM) 5 % Patch APPLY 1 PATCH TO SKIN DAILY FOR 12 HOURS ON THEN 12 HOURS OFF      SODIUM FLUORIDE 5000 PLUS 1.1 % Cream BRUSH WITH PEA SIZEED AMOUNT ONCE A DAY PREFERABLY BEFORE BEDTIME. SPIT OUT ALL EXCESS. DO NOT RINSE      atorvastatin (LIPITOR) 20 MG Tab Take 1 Tablet by mouth every evening. 90 Tablet 3    gabapentin (NEURONTIN) 300 MG Cap Take 600 mg by mouth 3 times a day.      Multiple Vitamins-Minerals (PRESERVISION AREDS 2 PO) Take 1 Capsule by mouth 2 times a day.      Calcium Carbonate 600 MG Tab Take 600 mg by mouth every morning.      polyethylene glycol/lytes (MIRALAX) 17 g Pack Take 1 Packet by mouth 1 time a day as needed (if sennosides and docusate ineffective after 24 hours).  3    acetaminophen (TYLENOL) 500 MG Tab Take 650 mg by mouth 3 times a day as needed for Mild Pain. 1.5 in am, 1 tab at noon, 2 in pm  Indications: Pain      melatonin 5 mg Tab Take 10 mg by mouth every evening.      vitamin D (CHOLECALCIFEROL) 1000 UNIT Tab Take 1,000 Units by mouth every morning.       No current facility-administered medications for this visit.      She  has a past medical history of Adenocarcinoma of colon ([facility]) (10/30/2018), Anesthesia, Back pain (06/01/2022), Blood clotting disorder (HCC) (2012), Bowel habit changes, Breath shortness (06/26/2023), Cancer (HCC), Cataract, Chronic back pain greater than 3 months duration, Deep vein blood clot of left lower extremity (HCC) (2022), Deep vein thrombosis (HCC) (03/08/2016), Essential hypertension, benign (06/27/2012), GERD (gastroesophageal reflux disease) (06/27/2012), Heart murmur, High cholesterol, Hyperlipidemia, Hypertension, Impaired fasting glucose (11/02/2017), Melanoma (HCC), Mild aortic stenosis, Other and unspecified hyperlipidemia (06/27/2012), Prediabetes, Protein S deficiency (HCC) (11/02/2017), Rheumatoid arthritis involving multiple sites with positive rheumatoid factor ([facility]) (03/14/2016), Rheumatoid nodule ([facility]) (07/25/2017), Right bundle branch block (06/27/2012), and Urinary incontinence (11/02/2017).    She has no past medical history of Acute nasopharyngitis, Anginal syndrome (HCC), Asthma, Carcinoma in situ of respiratory system, Congestive heart failure (HCC), Continuous ambulatory peritoneal dialysis status (HCC), Coughing blood, Dental disorder, Diabetes (HCC), Dialysis patient (HCC), Disorder of thyroid, Emphysema of lung (HCC), Glaucoma, Gynecological disorder, Heart burn, Hepatitis A, Hepatitis B, Hepatitis C, Hiatus hernia syndrome, Indigestion, Infectious disease, Jaundice, Myocardial infarct (HCC), Pacemaker, Pneumonia, Pregnant, Psychiatric problem, Renal disorder, Rheumatic fever, Seizure (HCC), Sleep apnea, Snoring, Stroke (HCC), Tuberculosis, or Urinary bladder disorder.    ROS   Other than what is mentioned in HPI or physical exam, there is no history of headaches, double vision or blurred vision.  No trouble swallowing difficulties .  No chest complaints including chest pain, cough or sputum production. No GI complaints including nausea, vomiting, change in bowel habits, or  past peptic ulcer disease. No history of blood in the stools. No urinary complaints including dysuria or frequency. No history of alopecia, photosensitivity     Objective:     /68   Pulse 65   Temp 36.6 °C (97.8 °F) (Temporal)   Resp 14   Wt 73 kg (161 lb)   SpO2 97%  Body mass index is 28.52 kg/m².   Physical Exam:    Constitutional: Alert and oriented X3, patient is talkative with good eye contact.Skin: Warm, dry, good turgor, no rashes in visible areas.Eye: Equal, round and reactive, conjunctiva clear, lids normal EOM intactENMT: Lips without lesions,  pinna without deformityNeck: Trachea midline, no masses, no thyromegaly.Lymph:  No cervical lymphadenopathy, no axillary lymphadenopathy, no supraclavicular lymphadenopathyRespiratory: Unlabored respiratory effort, lungs clear to auscultation, no wheezes, no ronchi.Cardiovascular: Normal S1, S2, positive 2 out of 6 to 3 out of 6 systolic murmur heard best at the left sternal border.Abdomen: Soft, non-distended.Psych: Alert and oriented x3, normal affect and mood.Neuro: Cranial nerves 2-12 are grossly intact Ext:no joint laxity noted in bilateral arms, no joint laxity noted in bilateral legs, evidence of Heberden's nodes most DIP joints both hands, no swan-neck or boutonniere deformities, shoulders poor range of motion, elbows without flexion contractures no nodules, toes without crossover toes and without splay toes    Lab Results   Component Value Date/Time    QNTTBGOLD Negative 06/29/2016 02:03 PM     Lab Results   Component Value Date/Time    HEPBCORTOT NonReactive 07/18/2022 01:00 PM    HEPBCORIGM Negative 06/29/2016 02:03 PM    HEPBSAG Non-Reactive 07/18/2022 01:00 PM     Lab Results   Component Value Date/Time    SODIUM 136 09/26/2023 03:20 AM    POTASSIUM 4.0 09/26/2023 03:20 AM    CHLORIDE 102 09/26/2023 03:20 AM    CO2 23 09/26/2023 03:20 AM    GLUCOSE 136 (H) 09/26/2023 03:20 AM    BUN 23 (H) 09/26/2023 03:20 AM    CREATININE 0.79 09/26/2023  03:20 AM      Lab Results   Component Value Date/Time    WBC 11.0 (H) 09/26/2023 03:20 AM    RBC 3.58 (L) 09/26/2023 03:20 AM    HEMOGLOBIN 11.2 (L) 09/26/2023 03:20 AM    HEMATOCRIT 33.6 (L) 09/26/2023 03:20 AM    MCV 93.9 09/26/2023 03:20 AM    MCH 31.3 09/26/2023 03:20 AM    MCHC 33.3 09/26/2023 03:20 AM    MPV 11.4 09/26/2023 03:20 AM    NEUTSPOLYS 72.70 (H) 09/21/2023 01:58 PM    LYMPHOCYTES 14.10 (L) 09/21/2023 01:58 PM    MONOCYTES 10.10 09/21/2023 01:58 PM    EOSINOPHILS 2.20 09/21/2023 01:58 PM    BASOPHILS 0.60 09/21/2023 01:58 PM      Lab Results   Component Value Date/Time    CALCIUM 8.8 09/26/2023 03:20 AM    ASTSGOT 19 09/26/2023 03:20 AM    ALTSGPT 13 09/26/2023 03:20 AM    ALKPHOSPHAT 63 09/26/2023 03:20 AM    TBILIRUBIN 0.4 09/26/2023 03:20 AM    ALBUMIN 3.1 (L) 09/26/2023 03:20 AM    TOTPROTEIN 5.9 (L) 09/26/2023 03:20 AM     Lab Results   Component Value Date/Time    URICACID 4.5 06/29/2016 02:03 PM    RHEUMFACTN <10 06/29/2016 02:03 PM    CCPANTIBODY 4 06/29/2016 02:03 PM     Lab Results   Component Value Date/Time    SEDRATEWES 11 07/07/2018 11:25 AM     Lab Results   Component Value Date/Time    HBA1C 5.6 09/16/2022 01:14 PM     Lab Results   Component Value Date/Time    G6PD 13.6 05/20/2019 10:01 AM     Lab Results   Component Value Date/Time    CPKTOTAL 61 06/29/2016 02:03 PM     Assessment and Plan:     1. Primary osteoarthritis involving multiple joints  Currently takes Tylenol as needed with benefit, would like to continue with such  - DS-BONE DENSITY STUDY (DEXA); Future    2. Osteoporosis without current pathological fracture, unspecified osteoporosis type  Last DEXA August 2022 next DEXA August 2024, DEXA ordered for patient patient currently on Prolia 60 mg subcu every 6 months   continue calcium citrate 1200 mg by mouth daily and vitamin D about 2000 units by mouth daily and magnesium 200 mg by mouth daily  - DS-BONE DENSITY STUDY (DEXA); Future    3. Chronic anticoagulation  This  will impact with kind of medications we can use for this patient's arthritis, NSAIDs are contraindicated because of increased risk of bleeding while on chronic anticoagulation  - DS-BONE DENSITY STUDY (DEXA); Future    4. Protein S deficiency (HCC)  On chronic anticoagulation  - DS-BONE DENSITY STUDY (DEXA); Future    5. Essential hypertension, benign  May impact the type of medications we can use for this patient's arthritis. We will have to keep this under advisement.    6. Macular degeneration of both eyes, unspecified type  Followed by ophthalmology    7. Malignant melanoma, unspecified site (HCC)  Followed by [facility]    8. Neuropathy  Of right lower extremity status post lumbar spine surgery with great benefit    9. Aortic valve stenosis, etiology of cardiac valve disease unspecified  Followed by cardiology recently this year status post bovine valve replacement    Followup: Return in about 1 year (around 12/28/2024). or sooner jose eduardo [place] Kareen  was seen 30 minutes face-to-face of which more than 50% of the time was spent counseling the patient (excluding time for procedures)  regarding  rheumatological condition and care. Therapy was discussed in detail.      Please note that this dictation was created using voice recognition software. I have made every reasonable attempt to correct obvious errors, but I expect that there are errors of grammar and possibly content that I did not discover before finalizing the note.

## 2024-01-11 ENCOUNTER — OFFICE VISIT (OUTPATIENT)
Dept: MEDICAL GROUP | Facility: PHYSICIAN GROUP | Age: 89
End: 2024-01-11
Payer: MEDICARE

## 2024-01-11 VITALS
DIASTOLIC BLOOD PRESSURE: 84 MMHG | RESPIRATION RATE: 16 BRPM | HEIGHT: 63 IN | HEART RATE: 86 BPM | OXYGEN SATURATION: 94 % | TEMPERATURE: 97.9 F | WEIGHT: 160 LBS | BODY MASS INDEX: 28.35 KG/M2 | SYSTOLIC BLOOD PRESSURE: 122 MMHG

## 2024-01-11 DIAGNOSIS — N39.45 CONTINUOUS LEAKAGE OF URINE: ICD-10-CM

## 2024-01-11 PROCEDURE — 3079F DIAST BP 80-89 MM HG: CPT | Performed by: NURSE PRACTITIONER

## 2024-01-11 PROCEDURE — 99213 OFFICE O/P EST LOW 20 MIN: CPT | Performed by: NURSE PRACTITIONER

## 2024-01-11 PROCEDURE — 3074F SYST BP LT 130 MM HG: CPT | Performed by: NURSE PRACTITIONER

## 2024-01-11 ASSESSMENT — ENCOUNTER SYMPTOMS
MUSCULOSKELETAL NEGATIVE: 1
SPUTUM PRODUCTION: 0
NEUROLOGICAL NEGATIVE: 1
FLANK PAIN: 0
COUGH: 0
NAUSEA: 0
DIARRHEA: 0
HEARTBURN: 0
PALPITATIONS: 0
ABDOMINAL PAIN: 0
BLOOD IN STOOL: 0
VOMITING: 0
SHORTNESS OF BREATH: 0
CONSTITUTIONAL NEGATIVE: 1
FEVER: 0
CONSTIPATION: 1

## 2024-01-11 ASSESSMENT — FIBROSIS 4 INDEX: FIB4 SCORE: 2.87

## 2024-01-11 NOTE — PROGRESS NOTES
Subjective       CC:   Chief Complaint   Patient presents with    Enuresis     Urine and bowel movements for several years.        HPI:   Patient is a 88 y.o. established female patient of FLAQUITO Cavazos with medical history listed below here today with concerns of urinary incontinence. States  ongoing for several years. Hx-colon cancer with resection 2019. She was referred to [facility] back in 2022, she was last seen by them in 2023; was advised to get a bidet and use prn stool softeners, miralax, fiber. She has constipation sometimes and takes stool softeners. She takes daily fiber supplements and sometimes will add miralax. She will contact their office to schedule follow up for this year.  States urinary incontinence has progressed over the years, now has constant leakage. Denies dysuria, vaginal itching, hematuria, abdominal pain. We discussed kiegal exercises today, she has not yet tried this. She would also like to see urology for other management options and referral placed today.     Patient Active Problem List   Diagnosis    Right bundle branch block    Essential hypertension, benign    Other hyperlipidemia    GERD (gastroesophageal reflux disease)    Back pain    Rheumatoid arthritis involving multiple sites with positive rheumatoid factor (HCC)    Rheumatoid nodule (HCC)    Malignant melanoma of skin of left leg (HCC)    History of colon cancer - s/p resection    Olson syndrome    Advanced care planning/counseling discussion    Osteoporosis    Long term (current) use of anticoagulants    Bilateral knee pain    Onychomycosis    Malignant melanoma of chest wall (HCC)    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)    Hip asymmetry    History of recurrent deep vein thrombosis (DVT)    Spondylolisthesis, lumbar region    Lumbar stenosis with neurogenic claudication    Carotid atherosclerosis, bilateral    Protein S deficiency (HCC)    Presence of IVC filter     "Dermoid cyst    S/P TAVR (transcatheter aortic valve replacement)    Coronary artery disease involving native coronary artery of native heart without angina pectoris       Past Medical History:   Diagnosis Date    Adenocarcinoma of colon (HCC) 10/30/2018    From sessile serrated polyp 10/2018    Anesthesia     pt states \"one new dr scraped my esophagus when they put the tube in and I started bleeding so they couldn't operate\"     Back pain 06/01/2022    0/10    Blood clotting disorder (HCC) 2012    clot in leg    Bowel habit changes     constipation     Breath shortness 06/26/2023    with exertion    Cancer (HCC)     skin, colon 2018    Cataract     belia IOL     Chronic back pain greater than 3 months duration     Deep vein blood clot of left lower extremity (HCC) 2022    Deep vein thrombosis (HCC) 03/08/2016    First occurrence in [place] in late 1970s Second occurrence further up in [place] in 2012, has been on AC since     Essential hypertension, benign 06/27/2012    GERD (gastroesophageal reflux disease) 06/27/2012    Heart murmur     High cholesterol     Hyperlipidemia     Hypertension     hx of, not currently     Impaired fasting glucose 11/02/2017    Melanoma (HCC)     Mild aortic stenosis     Seeing Dr. Van    Other and unspecified hyperlipidemia 06/27/2012    Prediabetes     Protein S deficiency (HCC) 11/02/2017    Noted in lab work 2013 as a part of work up at [facility]     Rheumatoid arthritis involving multiple sites with positive rheumatoid factor (HCC) 03/14/2016    Dr. Escoto    Rheumatoid nodule ([facility]) 07/25/2017    Right bundle branch block 06/27/2012    pt denies     Urinary incontinence 11/02/2017        Past Surgical History:   Procedure Laterality Date    TRANSCATHETER AORTIC VALVE REPLACEMENT Bilateral 9/25/2023    Procedure: TRANSCATHETER AORTIC VALVE REPLACEMENT;  Surgeon: Haseeb Serrano M.D.;  Location: SURGERY [facility];  Service: Cardiac    ECHOCARDIOGRAM, TRANSESOPHAGEAL, INTRAOPERATIVE " N/A 9/25/2023    Procedure: ECHOCARDIOGRAM, TRANSESOPHAGEAL, INTRAOPERATIVE;  Surgeon: Haseeb Serrano M.D.;  Location: SURGERY [facility];  Service: Cardiac    PACEMAKER INSERTION Right 9/25/2023    Procedure: INSERTION, TEMPORARY CARDIAC PACEMAKER;  Surgeon: Haseeb Serrano M.D.;  Location: SURGERY [facility];  Service: Cardiac    LUMBAR FUSION O-ARM  09/21/2022    Procedure: L3-4 and L4-5 decompressive laminectomy, bilateral foraminotomies and L3-4-5 posterior lumbar instrumented fusion;  Surgeon: Rosa M Bonner M.D.;  Location: SURGERY [facility];  Service: Neurosurgery    LUMBAR DECOMPRESSION  09/21/2022    Procedure: DECOMPRESSION, SPINE, LUMBAR;  Surgeon: Rosa M Bonner M.D.;  Location: SURGERY [facility];  Service: Neurosurgery    LUMBAR LAMINECTOMY DISKECTOMY  09/21/2022    Procedure: LAMINECTOMY, SPINE, LUMBAR, WITH DISCECTOMY;  Surgeon: Rosa M Bonner M.D.;  Location: SURGERY [facility];  Service: Neurosurgery    FORAMINOTOMY  09/21/2022    Procedure: FORAMINOTOMY, SPINE;  Surgeon: Rosa M Bonner M.D.;  Location: SURGERY [facility];  Service: Neurosurgery    NM INJECTION,SACROILIAC JOINT Right 07/12/2022    Procedure: RIGHT sacroiliac joint injection with fluoroscopic guidance.;  Surgeon: Indira Lee M.D.;  [facility]: [facility];  Service: Pain Management    NM REMOVE ARMPITS LYMPH NODES COMPLT Left 06/07/2022    Procedure: LYMPHADENECTOMY, AXILLARY;  Surgeon: Bernardino Torres M.D.;  Location: SURGERY SAME DAY [facility];  Service: General    BLOCK EPIDURAL STEROID INJECTION Right 05/31/2022    Procedure: RIGHT L4-5 and L5-S1 transforaminal epidural steroid injection with fluoroscopic guidance.;  Surgeon: Indira Lee M.D.;  Location: [facility];  Service: Pain Management    BLOCK EPIDURAL STEROID INJECTION Right 05/10/2022    Procedure: Lumbar L4-5 interlaminar epidural steroid injection;  Surgeon: Indira Lee M.D.;  Location: [facility];  Service: Pain Management    WIDE EXCISION, LESION, HEAD AND NECK REGION Left 03/29/2022    Procedure: WIDE EXCISION, LESION, HEAD AND NECK REGION - RADICAL MALIGNANT MELANOMA OF LEFT CHEST;  Surgeon: Bernardino Torres M.D.;  Location: SURGERY SAME DAY [facility];  Service: General    LUMBAR MEDIAL BRANCH BLOCKS Bilateral 12/15/2020    Procedure: BLOCK, NERVE, SPINAL, LUMBAR, POSTERIOR RAMUS, MEDIAL BRANCH;  Surgeon: Chris Cooper M.D.;  Location: [facility]ERY [facility];  Service: Pain Management    IRRIGATION & DEBRIDEMENT ORTHO Bilateral 07/31/2020    Procedure: IRRIGATION AND DEBRIDEMENT, WOUND - KNEE;  Surgeon: Roosevelt Saucedo M.D.;  Location: SURGERY [facility];  Service: Orthopedics    HEMICOLECTOMY Right 12/13/2018    Procedure: OPEN RIGHT HEMICOLECTOMY;  Surgeon: Dash Bhagat M.D.;  Location: SURGERY [facility];  Service: General    INGUINAL HERNIA REPAIR Right 09/23/2016    Procedure: INGUINAL HERNIA REPAIR - PRIMARY;  Surgeon: Mary Medina M.D.;  Location: SURGERY [facility];  Service:     OTHER  08/12/2014    peroneal nerve surgery Dr. Castro     OTHER ORTHOPEDIC SURGERY  2011    meniscus repair    ARTHROPLASTY Left     hip replacement    CHOLECYSTECTOMY      HYSTERECTOMY, TOTAL ABDOMINAL  1970's    KNEE ARTHROPLASTY TOTAL Left     NO PERTINENT PAST SURGICAL HISTORY  2022    Skin Cancer, Lymph nodes removed    OTHER Left     Upper chest, skin cancer removed    ROTATOR CUFF REPAIR Bilateral         Current Outpatient Medications on File Prior to Visit   Medication Sig Dispense Refill    rivaroxaban (XARELTO) 20 MG Tab tablet Take 20 mg by mouth with dinner.      losartan (COZAAR) 50 MG Tab Take 1 Tablet by mouth at bedtime. 90 Tablet 3    omeprazole (PRILOSEC) 20 MG delayed-release capsule Take 1 Capsule by mouth every morning. 90 Capsule 3    diphenhydrAMINE-APAP, sleep,  MG Tab Take 2 Tablets by mouth at bedtime.      lidocaine (LIDODERM) 5 % Patch APPLY  "1 PATCH TO SKIN DAILY FOR 12 HOURS ON THEN 12 HOURS OFF      SODIUM FLUORIDE 5000 PLUS 1.1 % Cream BRUSH WITH PEA SIZEED AMOUNT ONCE A DAY PREFERABLY BEFORE BEDTIME. SPIT OUT ALL EXCESS. DO NOT RINSE      atorvastatin (LIPITOR) 20 MG Tab Take 1 Tablet by mouth every evening. 90 Tablet 3    gabapentin (NEURONTIN) 300 MG Cap Take 600 mg by mouth 3 times a day.      Multiple Vitamins-Minerals (PRESERVISION AREDS 2 PO) Take 1 Capsule by mouth 2 times a day.      Calcium Carbonate 600 MG Tab Take 600 mg by mouth every morning.      polyethylene glycol/lytes (MIRALAX) 17 g Pack Take 1 Packet by mouth 1 time a day as needed (if sennosides and docusate ineffective after 24 hours).  3    acetaminophen (TYLENOL) 500 MG Tab Take 650 mg by mouth 3 times a day as needed for Mild Pain. 1.5 in am, 1 tab at noon, 2 in pm  Indications: Pain      melatonin 5 mg Tab Take 10 mg by mouth every evening.      vitamin D (CHOLECALCIFEROL) 1000 UNIT Tab Take 1,000 Units by mouth every morning.       No current facility-administered medications on file prior to visit.        Health Maintenance: Completed    ROS:  Review of Systems   Constitutional: Negative.  Negative for fever and malaise/fatigue.   Respiratory:  Negative for cough, sputum production and shortness of breath.    Cardiovascular:  Negative for chest pain, palpitations and leg swelling.   Gastrointestinal:  Positive for constipation. Negative for abdominal pain, blood in stool, diarrhea, heartburn, nausea and vomiting.   Genitourinary: Negative.  Negative for dysuria, flank pain, frequency, hematuria and urgency.        Urinary incontinence   Musculoskeletal: Negative.    Neurological: Negative.    Endo/Heme/Allergies: Negative.        Objective       Exam:  /84   Pulse 86   Temp 36.6 °C (97.9 °F) (Temporal)   Resp 16   Ht 1.6 m (5' 3\")   Wt 72.6 kg (160 lb)   LMP  (LMP Unknown)   SpO2 94%   BMI 28.34 kg/m²  Body mass index is 28.34 kg/m².    Physical " Exam  Constitutional:       Appearance: Normal appearance.   Abdominal:      General: Bowel sounds are normal.      Palpations: Abdomen is soft.      Tenderness: There is no abdominal tenderness. There is no right CVA tenderness or left CVA tenderness.   Neurological:      Mental Status: She is alert.         Assessment & Plan       88 y.o. female with the following -   1. Continuous leakage of urine  Referral to Urology        Educated in proper administration of medication(s) ordered today including safety, possible SE, risks, benefits, rationale and alternatives to therapy.     Return if symptoms worsen or fail to improve.    Please note that this dictation was created using voice recognition software. I have made every reasonable attempt to correct obvious errors, but I expect that there are errors of grammar and possibly content that I did not discover before finalizing the note.

## 2024-01-15 ENCOUNTER — OUTPATIENT INFUSION SERVICES (OUTPATIENT)
Dept: ONCOLOGY | Facility: MEDICAL CENTER | Age: 89
End: 2024-01-15
Attending: INTERNAL MEDICINE
Payer: MEDICARE

## 2024-01-15 ENCOUNTER — HOSPITAL ENCOUNTER (OUTPATIENT)
Dept: RADIOLOGY | Facility: MEDICAL CENTER | Age: 89
End: 2024-01-15
Attending: PHYSICAL MEDICINE & REHABILITATION
Payer: MEDICARE

## 2024-01-15 VITALS
TEMPERATURE: 97.8 F | OXYGEN SATURATION: 94 % | RESPIRATION RATE: 18 BRPM | DIASTOLIC BLOOD PRESSURE: 75 MMHG | HEART RATE: 76 BPM | WEIGHT: 161.6 LBS | SYSTOLIC BLOOD PRESSURE: 136 MMHG | BODY MASS INDEX: 29.74 KG/M2 | HEIGHT: 62 IN

## 2024-01-15 DIAGNOSIS — M25.551 RIGHT HIP PAIN: ICD-10-CM

## 2024-01-15 DIAGNOSIS — M81.0 OSTEOPOROSIS, UNSPECIFIED OSTEOPOROSIS TYPE, UNSPECIFIED PATHOLOGICAL FRACTURE PRESENCE: ICD-10-CM

## 2024-01-15 DIAGNOSIS — M25.551 PAIN OF RIGHT HIP JOINT: ICD-10-CM

## 2024-01-15 LAB
CA-I BLD ISE-SCNC: 1.11 MMOL/L (ref 1.1–1.3)
CREAT BLD-MCNC: 1 MG/DL (ref 0.5–1.4)

## 2024-01-15 PROCEDURE — 96372 THER/PROPH/DIAG INJ SC/IM: CPT

## 2024-01-15 PROCEDURE — 82330 ASSAY OF CALCIUM: CPT

## 2024-01-15 PROCEDURE — 82565 ASSAY OF CREATININE: CPT

## 2024-01-15 PROCEDURE — 36415 COLL VENOUS BLD VENIPUNCTURE: CPT

## 2024-01-15 PROCEDURE — 700111 HCHG RX REV CODE 636 W/ 250 OVERRIDE (IP): Mod: JZ,JG | Performed by: INTERNAL MEDICINE

## 2024-01-15 PROCEDURE — 73502 X-RAY EXAM HIP UNI 2-3 VIEWS: CPT | Mod: RT

## 2024-01-15 RX ORDER — AMLODIPINE BESYLATE 2.5 MG/1
2.5 TABLET ORAL EVERY EVENING
Status: ON HOLD | COMMUNITY

## 2024-01-15 RX ADMIN — DENOSUMAB 60 MG: 60 INJECTION SUBCUTANEOUS at 14:45

## 2024-01-15 ASSESSMENT — FIBROSIS 4 INDEX: FIB4 SCORE: 2.87

## 2024-01-15 NOTE — PROGRESS NOTES
Patient to [facility] for Prolia injection. Istat Ca+ and Cr performed. Patient meets parameters for injection. Patient states that she has not had any dental work done recently. Prolia injected into right back of arm. Emailed scheduling for future appointment. Patient to home.

## 2024-02-02 ENCOUNTER — APPOINTMENT (RX ONLY)
Dept: URBAN - METROPOLITAN AREA CLINIC 20 | Facility: CLINIC | Age: 89
Setting detail: DERMATOLOGY
End: 2024-02-02

## 2024-02-02 DIAGNOSIS — L72.0 EPIDERMAL CYST: ICD-10-CM

## 2024-02-02 PROCEDURE — ? COSMETIC EXTRACTIONS

## 2024-02-02 ASSESSMENT — LOCATION SIMPLE DESCRIPTION DERM
LOCATION SIMPLE: RIGHT CHEEK
LOCATION SIMPLE: LEFT NOSE
LOCATION SIMPLE: RIGHT LIP
LOCATION SIMPLE: RIGHT TEMPLE
LOCATION SIMPLE: RIGHT EYEBROW
LOCATION SIMPLE: LEFT CHEEK

## 2024-02-02 ASSESSMENT — LOCATION DETAILED DESCRIPTION DERM
LOCATION DETAILED: RIGHT SUPERIOR LATERAL MALAR CHEEK
LOCATION DETAILED: RIGHT CENTRAL EYEBROW
LOCATION DETAILED: LEFT CENTRAL MALAR CHEEK
LOCATION DETAILED: RIGHT INFERIOR MEDIAL MALAR CHEEK
LOCATION DETAILED: LEFT SUPERIOR LATERAL BUCCAL CHEEK
LOCATION DETAILED: LEFT MEDIAL BUCCAL CHEEK
LOCATION DETAILED: LEFT SUPERIOR LATERAL MALAR CHEEK
LOCATION DETAILED: LEFT INFERIOR CENTRAL MALAR CHEEK
LOCATION DETAILED: RIGHT CENTRAL BUCCAL CHEEK
LOCATION DETAILED: RIGHT LOWER CUTANEOUS LIP
LOCATION DETAILED: RIGHT MEDIAL EYEBROW
LOCATION DETAILED: RIGHT INFERIOR CENTRAL MALAR CHEEK
LOCATION DETAILED: LEFT INFERIOR MEDIAL MALAR CHEEK
LOCATION DETAILED: RIGHT MID TEMPLE
LOCATION DETAILED: RIGHT SUPERIOR MEDIAL BUCCAL CHEEK
LOCATION DETAILED: LEFT CENTRAL BUCCAL CHEEK
LOCATION DETAILED: LEFT NASAL SIDEWALL

## 2024-02-02 ASSESSMENT — LOCATION ZONE DERM
LOCATION ZONE: FACE
LOCATION ZONE: NOSE
LOCATION ZONE: LIP

## 2024-02-02 NOTE — PROCEDURE: COSMETIC EXTRACTIONS
Detail Level: Detailed
Post-Care Instructions: I reviewed with the patient in detail post-care instructions. Patient is to wear sunprotection, and avoid picking at any of the treated lesions.
Render The Number Of Extractions: No
Anesthesia Volume In Cc: 0
Price (Use Numbers Only, No Special Characters Or $): 240
Consent: The patient's consent was obtained including but not limited to risks of crusting, scabbing, blistering, scarring, darker or lighter pigmentary change, recurrence, incomplete removal and infection.

## 2024-02-07 DIAGNOSIS — I10 PRIMARY HYPERTENSION: ICD-10-CM

## 2024-02-07 RX ORDER — LOSARTAN POTASSIUM 50 MG/1
50 TABLET ORAL
Qty: 90 TABLET | Refills: 0 | Status: ON HOLD | OUTPATIENT
Start: 2024-02-07

## 2024-02-22 ENCOUNTER — OFFICE VISIT (OUTPATIENT)
Dept: UROLOGY | Facility: MEDICAL CENTER | Age: 89
End: 2024-02-22
Payer: MEDICARE

## 2024-02-22 VITALS
BODY MASS INDEX: 29.62 KG/M2 | WEIGHT: 163 LBS | HEART RATE: 78 BPM | OXYGEN SATURATION: 98 % | TEMPERATURE: 98.9 F | SYSTOLIC BLOOD PRESSURE: 133 MMHG | DIASTOLIC BLOOD PRESSURE: 66 MMHG

## 2024-02-22 DIAGNOSIS — N39.41 URGE INCONTINENCE: ICD-10-CM

## 2024-02-22 LAB
APPEARANCE UR: CLEAR
BILIRUB UR STRIP-MCNC: NORMAL MG/DL
COLOR UR AUTO: YELLOW
GLUCOSE UR STRIP.AUTO-MCNC: NORMAL MG/DL
KETONES UR STRIP.AUTO-MCNC: NORMAL MG/DL
LEUKOCYTE ESTERASE UR QL STRIP.AUTO: NORMAL
NITRITE UR QL STRIP.AUTO: NORMAL
PH UR STRIP.AUTO: 6.5 [PH] (ref 5–8)
PROT UR QL STRIP: NORMAL MG/DL
RBC UR QL AUTO: NORMAL
SP GR UR STRIP.AUTO: 1.01
UROBILINOGEN UR STRIP-MCNC: 0.2 MG/DL

## 2024-02-22 PROCEDURE — 3078F DIAST BP <80 MM HG: CPT

## 2024-02-22 PROCEDURE — 81002 URINALYSIS NONAUTO W/O SCOPE: CPT

## 2024-02-22 PROCEDURE — 3075F SYST BP GE 130 - 139MM HG: CPT

## 2024-02-22 PROCEDURE — 99214 OFFICE O/P EST MOD 30 MIN: CPT

## 2024-02-22 RX ORDER — TROSPIUM CHLORIDE 20 MG/1
20 TABLET, FILM COATED ORAL
Qty: 90 TABLET | Refills: 3 | Status: SHIPPED | OUTPATIENT
Start: 2024-02-22 | End: 2024-02-26

## 2024-02-22 ASSESSMENT — FIBROSIS 4 INDEX: FIB4 SCORE: 2.87

## 2024-02-22 NOTE — PATIENT INSTRUCTIONS
Make sure you are having regular movements- use your miralax for a goal of daily bowel movements  Trospium can cause dry mouth, dry eye or constipation.  Use Goodrx coupon if medication too expensive   See you in 6 weeks

## 2024-02-22 NOTE — PROGRESS NOTES
Subjective  Marry Mathur is a 88 y.o. female who presents today for evaluation of urge incontinence/stress incontinence     Patient endorses OAB/urge Incontinence/stress incontinence.    Patient is using 6-7 pads/day and wakes up multiple times per night because she is reporting that she is wet and changes her pad.     Associated signs and symptoms: Urgency with leakage, and frequency voiding every 2-3 hours and fins this bothersome.    Patient denies dysuria, no hematuria, vaginal dryness/vaginal itching, not sexually active, no pain with wiping, and no recent UTIs.    Saw a urologist 10-15 years ago for UTI and has not been back since.     Patient is aware of surgical procedures for OAB/urge and was interested in medical management before she considers sacroneuromodulation or surgical interventions.        Family History   Problem Relation Age of Onset    Cancer Mother         stomach- ruptured appendix    Cancer Father         colon    Leukemia Father         many exposure, worked for [employer]     Heart Smart[employer] Sister     Heart Disease Brother         s/p stent     Other Son         on blood thinner, unclear etiology    Clotting Disorder Neg Hx        Social History     Socioeconomic History    Marital status:      Spouse name: Not on file    Number of children: Not on file    Years of education: Not on file    Highest education level: Not on file   Occupational History    Not on file   Tobacco Use    Smoking status: Never    Smokeless tobacco: Never   Vaping Use    Vaping Use: Never used   Substance and Sexual Activity    Alcohol use: Not Currently     Comment: rarely    Drug use: Never    Sexual activity: Not Currently     Partners: Male     Comment:     Other Topics Concern    Not on file   Social History Narrative    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      Social Determinants of Health     Financial Resource Strain: Not on file   Food  Insecurity: Not on file   Transportation Needs: Not on file   Physical Activity: Not on file   Stress: Not on file   Social Connections: Not on file   Intimate Partner Violence: Not on file   Housing Stability: Not on file       Past Surgical History:   Procedure Laterality Date    TRANSCATHETER AORTIC VALVE REPLACEMENT Bilateral 9/25/2023    Procedure: TRANSCATHETER AORTIC VALVE REPLACEMENT;  Surgeon: Haseeb Serrano M.D.;  Location: [facility];  Service: Cardiac    ECHOCARDIOGRAM, TRANSESOPHAGEAL, INTRAOPERATIVE N/A 9/25/2023    Procedure: ECHOCARDIOGRAM, TRANSESOPHAGEAL, INTRAOPERATIVE;  Surgeon: Haseeb Serrano M.D.;  Location: SURGERY [facility];  Service: Cardiac    PACEMAKER INSERTION Right 9/25/2023    Procedure: INSERTION, TEMPORARY CARDIAC PACEMAKER;  Surgeon: Haseeb Serrano M.D.;  Location: [facility];  Service: Cardiac    LUMBAR FUSION O-ARM  09/21/2022    Procedure: L3-4 and L4-5 decompressive laminectomy, bilateral foraminotomies and L3-4-5 posterior lumbar instrumented fusion;  Surgeon: Rosa M Bonner M.D.;  Location: [facility];  Service: Neurosurgery    LUMBAR DECOMPRESSION  09/21/2022    Procedure: DECOMPRESSION, SPINE, LUMBAR;  Surgeon: Rosa M Bonner M.D.;  Location: SURGERY [facility];  Service: Neurosurgery    LUMBAR LAMINECTOMY DISKECTOMY  09/21/2022    Procedure: LAMINECTOMY, SPINE, LUMBAR, WITH DISCECTOMY;  Surgeon: Rosa M Bonner M.D.;  Location: [facility];  Service: Neurosurgery    FORAMINOTOMY  09/21/2022    Procedure: FORAMINOTOMY, SPINE;  Surgeon: Rosa M Bonner M.D.;  Location: [facility];  Service: Neurosurgery    WV INJECTION,SACROILIAC JOINT Right 07/12/2022    Procedure: RIGHT sacroiliac joint injection with fluoroscopic guidance.;  Surgeon: Indira Lee M.D.;  Location: [facility];  Service: Pain Management    WV REMOVE ARMPITS LYMPH NODES COMPLT Left 06/07/2022    Procedure: LYMPHADENECTOMY, AXILLARY;   Surgeon: Bernardino Torres M.D.;  Location: SURGERY SAME DAY [facility];  Service: General    BLOCK EPIDURAL STEROID INJECTION Right 05/31/2022    Procedure: RIGHT L4-5 and L5-S1 transforaminal epidural steroid injection with fluoroscopic guidance.;  Surgeon: Indira Lee M.D.;  Location: [facility];  Service: Pain Management    BLOCK EPIDURAL STEROID INJECTION Right 05/10/2022    Procedure: Lumbar L4-5 interlaminar epidural steroid injection;  Surgeon: Indira Lee M.D.;  Location: [facility];  Service: Pain Management    WIDE EXCISION, LESION, HEAD AND NECK REGION Left 03/29/2022    Procedure: WIDE EXCISION, LESION, HEAD AND NECK REGION - RADICAL MALIGNANT MELANOMA OF LEFT CHEST;  Surgeon: Bernardino Torres M.D.;  Location: SURGERY SAME DAY [facility];  Service: General    LUMBAR MEDIAL BRANCH BLOCKS Bilateral 12/15/2020    Procedure: BLOCK, NERVE, SPINAL, LUMBAR, POSTERIOR RAMUS, MEDIAL BRANCH;  Surgeon: Chris Cooper M.D.;  Location: [facility]ERY [facility];  Service: Pain Management    IRRIGATION & DEBRIDEMENT ORTHO Bilateral 07/31/2020    Procedure: IRRIGATION AND DEBRIDEMENT, WOUND - KNEE;  Surgeon: Roosevelt Saucedo M.D.;  Location: SURGERY [facility];  Service: Orthopedics    HEMICOLECTOMY Right 12/13/2018    Procedure: OPEN RIGHT HEMICOLECTOMY;  Surgeon: Dash Bhagat M.D.;  Location: SURGERY [facility];  Service: General    INGUINAL HERNIA REPAIR Right 09/23/2016    Procedure: INGUINAL HERNIA REPAIR - PRIMARY;  Surgeon: Mary Medina M.D.;  Location: SURGERY [facility];  Service:     OTHER  08/12/2014    peroneal nerve surgery Dr. Castro     OTHER ORTHOPEDIC SURGERY  2011    meniscus repair    ARTHROPLASTY Left     hip replacement    CHOLECYSTECTOMY      HYSTERECTOMY, TOTAL ABDOMINAL  1970's    KNEE ARTHROPLASTY TOTAL Left     NO PERTINENT PAST SURGICAL HISTORY  2022    Skin Cancer, Lymph nodes removed    OTHER Left     Upper chest, skin  "cancer removed    ROTATOR CUFF REPAIR Bilateral        Past Medical History:   Diagnosis Date    Adenocarcinoma of colon (HCC) 10/30/2018    From sessile serrated polyp 10/2018    Anesthesia     pt states \"one new dr scraped my esophagus when they put the tube in and I started bleeding so they couldn't operate\"     Back pain 06/01/2022    0/10    Blood clotting disorder (HCC) 2012    clot in leg    Bowel habit changes     constipation     Breath shortness 06/26/2023    with exertion    Cancer (HCC)     skin, colon 2018    Cataract     belia IOL     Chronic back pain greater than 3 months duration     Deep vein blood clot of left lower extremity (HCC) 2022    Deep vein thrombosis (HCC) 03/08/2016    First occurrence in [place] in late 1970s Second occurrence further up in [place] in 2012, has been on AC since     Essential hypertension, benign 06/27/2012    GERD (gastroesophageal reflux disease) 06/27/2012    Heart murmur     High cholesterol     Hyperlipidemia     Hypertension     hx of, not currently     Impaired fasting glucose 11/02/2017    Melanoma (HCC)     Mild aortic stenosis     Seeing Dr. Van    Other and unspecified hyperlipidemia 06/27/2012    Prediabetes     Protein S deficiency ([facility]) 11/02/2017    Noted in lab work 2013 as a part of work up at [facility]     Rheumatoid arthritis involving multiple sites with positive rheumatoid factor ([facility]) 03/14/2016    Dr. Escoto    Rheumatoid nodule ([facility]) 07/25/2017    Right bundle branch block 06/27/2012    pt denies     Urinary incontinence 11/02/2017       Current Outpatient Medications   Medication Sig Dispense Refill    losartan (COZAAR) 50 MG Tab TAKE 1 TABLET BY MOUTH EVERY DAY 90 Tablet 0    amLODIPine (NORVASC) 2.5 MG Tab Take 2.5 mg by mouth every day.      rivaroxaban (XARELTO) 20 MG Tab tablet Take 20 mg by mouth with dinner.      omeprazole (PRILOSEC) 20 MG delayed-release capsule Take 1 Capsule by mouth every morning. 90 Capsule 3    diphenhydrAMINE-APAP, " sleep,  MG Tab Take 2 Tablets by mouth at bedtime.      lidocaine (LIDODERM) 5 % Patch APPLY 1 PATCH TO SKIN DAILY FOR 12 HOURS ON THEN 12 HOURS OFF      SODIUM FLUORIDE 5000 PLUS 1.1 % Cream BRUSH WITH PEA SIZEED AMOUNT ONCE A DAY PREFERABLY BEFORE BEDTIME. SPIT OUT ALL EXCESS. DO NOT RINSE      atorvastatin (LIPITOR) 20 MG Tab Take 1 Tablet by mouth every evening. 90 Tablet 3    gabapentin (NEURONTIN) 300 MG Cap Take 600 mg by mouth 3 times a day.      Multiple Vitamins-Minerals (PRESERVISION AREDS 2 PO) Take 1 Capsule by mouth 2 times a day.      Calcium Carbonate 600 MG Tab Take 600 mg by mouth every morning.      polyethylene glycol/lytes (MIRALAX) 17 g Pack Take 1 Packet by mouth 1 time a day as needed (if sennosides and docusate ineffective after 24 hours).  3    acetaminophen (TYLENOL) 500 MG Tab Take 650 mg by mouth 3 times a day as needed for Mild Pain. 1.5 in am, 1 tab at noon, 2 in pm  Indications: Pain      melatonin 5 mg Tab Take 10 mg by mouth every evening.      vitamin D (CHOLECALCIFEROL) 1000 UNIT Tab Take 1,000 Units by mouth every morning.       No current facility-administered medications for this visit.       Allergies   Allergen Reactions    Wound Dressing Adhesive      Pt says band-aids cause skin reaction/rash if on for more than 2 days  Paper tape OK         Objective  /66 (BP Location: Right arm, Patient Position: Sitting, BP Cuff Size: Adult)   Pulse 78   Temp 37.2 °C (98.9 °F) (Temporal)   Wt 73.9 kg (163 lb)   SpO2 98%   Physical Exam  Constitutional:       Appearance: Normal appearance.   HENT:      Head: Normocephalic and atraumatic.   Eyes:      Extraocular Movements: Extraocular movements intact.   Pulmonary:      Effort: Pulmonary effort is normal.   Genitourinary:     Comments: Pre 299  Post 28  Skin:     General: Skin is dry.   Neurological:      Mental Status: She is alert.   Psychiatric:         Mood and Affect: Mood normal.       Labs:   POCT UA   Lab Results    Component Value Date/Time    POCCOLOR Yellow 02/22/2024 01:09 PM    POCAPPEAR Clear 02/22/2024 01:09 PM    POCLEUKEST Neg 02/22/2024 01:09 PM    POCNITRITE Neg 02/22/2024 01:09 PM    POCUROBILIGE 0.2 02/22/2024 01:09 PM    POCPROTEIN Neg 02/22/2024 01:09 PM    POCURPH 6.5 02/22/2024 01:09 PM    POCBLOOD Neg 02/22/2024 01:09 PM    POCSPGRV 1.015 02/22/2024 01:09 PM    POCKETONES Neg 02/22/2024 01:09 PM    POCBILIRUBIN Neg 02/22/2024 01:09 PM    POCGLUCUA Neg 02/22/2024 01:09 PM            Imaging:   none    Assessment    At this time for OAB/urge incontinenence, patient is able to empty her bladder as her PVR was 28. I have started patient on Trospium 20mg PO TID. I have informed patient that this medication can cause dry mouth, dry eye and constipation.    For stress incontinence, we have discussed that patient can have surgical interventions but patient would like to start with medications.     RTC in 6 weeks     Plan    Problem List Items Addressed This Visit    None  Visit Diagnoses       Microhematuria        Relevant Orders    POCT Urinalysis    Urge incontinence        Relevant Orders    POCT Urinalysis

## 2024-02-26 DIAGNOSIS — N39.41 URGE INCONTINENCE: ICD-10-CM

## 2024-02-26 RX ORDER — TROSPIUM CHLORIDE 20 MG/1
20 TABLET, FILM COATED ORAL 2 TIMES DAILY
Qty: 60 TABLET | Refills: 1 | Status: ON HOLD | OUTPATIENT
Start: 2024-02-26 | End: 2024-03-21

## 2024-02-26 NOTE — PROGRESS NOTES
Call patient to let her know that I want her to only take Trospium 20mg PO BID. I only want her to take it two times per day NOT three times per day.    I will see her at her follow up but I need her to only take it two times per day

## 2024-03-08 ENCOUNTER — ANTICOAGULATION VISIT (OUTPATIENT)
Dept: MEDICAL GROUP | Facility: PHYSICIAN GROUP | Age: 89
End: 2024-03-08
Payer: MEDICARE

## 2024-03-08 VITALS
BODY MASS INDEX: 29.62 KG/M2 | WEIGHT: 163 LBS | HEART RATE: 70 BPM | SYSTOLIC BLOOD PRESSURE: 118 MMHG | DIASTOLIC BLOOD PRESSURE: 87 MMHG

## 2024-03-08 DIAGNOSIS — D68.59 PROTEIN S DEFICIENCY (HCC): ICD-10-CM

## 2024-03-08 DIAGNOSIS — Z79.01 LONG TERM (CURRENT) USE OF ANTICOAGULANTS: ICD-10-CM

## 2024-03-08 PROCEDURE — 3079F DIAST BP 80-89 MM HG: CPT | Performed by: FAMILY MEDICINE

## 2024-03-08 PROCEDURE — 99211 OFF/OP EST MAY X REQ PHY/QHP: CPT | Performed by: FAMILY MEDICINE

## 2024-03-08 PROCEDURE — 3074F SYST BP LT 130 MM HG: CPT | Performed by: FAMILY MEDICINE

## 2024-03-08 ASSESSMENT — FIBROSIS 4 INDEX: FIB4 SCORE: 2.87

## 2024-03-08 NOTE — PROGRESS NOTES
Target end date:Indefinite     Indication: Hx of recurrent DVT d/t FVL, AFib     Drug: Xarelto 20 mg once daily     CHADsVASC = At least 6 (Age, sex, HTN, hx of VTE)    Health Status Since Last Assessment   Patient denies any new relevant medical problems, ED visits or hospitalizations   Patient denies any embolic events (stroke/tia/systemic embolism)    Adherence with DOAC Therapy   Pt has no missed doses in the average week    Bleeding Risk Assessment   Denies Epistaxis   Pt denies any excessive or unusual bleeding/hematomas.  Pt denies any GI bleeds or hematemesis.  Pt denies any concerning daily headache or sub dural hematoma symptoms.   Pt denies any hematuria    Latest Hemoglobin: 11.8   Platelets: 112   ETOH overuse - Denies     Creatinine Clearance/Renal Function   Latest CrCl ~ 73 mL/min    Hepatic function   Latest LFTs WNL   Pt denies any history of liver dysfunction      Drug Interactions   ASA/other antiplatelets - Avoids   NSAID - Avoids   Other drug interactions - No significant DDI noted   x Verified no anticonvulsant or azole therapy, education provided for future use.     Examination   Blood Pressure - Recorded in vitals   Symptomatic hypotension - Denies   Significant gait impairment/imbalance/high risk for falls? Ambulates w/ cane, age related impairments    Final Assessment and Recommendations:   Patient appears stable from the anticoagulation standpoint.     Benefits of continued DOAC therapy outweigh risks for this patient   Recommend pt continue with current DOAC therapy - Xarelto 20 mg once daily   DOAC is affordable     Other Actions: cmp/ cbc hemogram ordered prior to next visit    Follow up:   Will follow up with patient 6 months.     Castillo Tomlin, PerlitaD, BCACP

## 2024-03-11 ENCOUNTER — HOSPITAL ENCOUNTER (OUTPATIENT)
Dept: LAB | Facility: MEDICAL CENTER | Age: 89
End: 2024-03-11
Attending: NURSE PRACTITIONER
Payer: MEDICARE

## 2024-03-11 DIAGNOSIS — D68.59 PROTEIN S DEFICIENCY (HCC): ICD-10-CM

## 2024-03-11 DIAGNOSIS — Z79.01 LONG TERM (CURRENT) USE OF ANTICOAGULANTS: ICD-10-CM

## 2024-03-11 PROCEDURE — 36415 COLL VENOUS BLD VENIPUNCTURE: CPT

## 2024-03-11 PROCEDURE — 80053 COMPREHEN METABOLIC PANEL: CPT

## 2024-03-11 PROCEDURE — 85027 COMPLETE CBC AUTOMATED: CPT

## 2024-03-12 LAB
ALBUMIN SERPL BCP-MCNC: 4.1 G/DL (ref 3.2–4.9)
ALBUMIN/GLOB SERPL: 1.4 G/DL
ALP SERPL-CCNC: 65 U/L (ref 30–99)
ALT SERPL-CCNC: 18 U/L (ref 2–50)
ANION GAP SERPL CALC-SCNC: 11 MMOL/L (ref 7–16)
AST SERPL-CCNC: 23 U/L (ref 12–45)
BILIRUB SERPL-MCNC: 0.3 MG/DL (ref 0.1–1.5)
BUN SERPL-MCNC: 27 MG/DL (ref 8–22)
CALCIUM ALBUM COR SERPL-MCNC: 8.5 MG/DL (ref 8.5–10.5)
CALCIUM SERPL-MCNC: 8.6 MG/DL (ref 8.5–10.5)
CHLORIDE SERPL-SCNC: 95 MMOL/L (ref 96–112)
CO2 SERPL-SCNC: 23 MMOL/L (ref 20–33)
CREAT SERPL-MCNC: 0.79 MG/DL (ref 0.5–1.4)
ERYTHROCYTE [DISTWIDTH] IN BLOOD BY AUTOMATED COUNT: 48.2 FL (ref 35.9–50)
GFR SERPLBLD CREATININE-BSD FMLA CKD-EPI: 72 ML/MIN/1.73 M 2
GLOBULIN SER CALC-MCNC: 2.9 G/DL (ref 1.9–3.5)
GLUCOSE SERPL-MCNC: 161 MG/DL (ref 65–99)
HCT VFR BLD AUTO: 41.1 % (ref 37–47)
HGB BLD-MCNC: 13.5 G/DL (ref 12–16)
MCH RBC QN AUTO: 29.9 PG (ref 27–33)
MCHC RBC AUTO-ENTMCNC: 32.8 G/DL (ref 32.2–35.5)
MCV RBC AUTO: 91.1 FL (ref 81.4–97.8)
PLATELET # BLD AUTO: 151 K/UL (ref 164–446)
PMV BLD AUTO: 11 FL (ref 9–12.9)
POTASSIUM SERPL-SCNC: 4.9 MMOL/L (ref 3.6–5.5)
PROT SERPL-MCNC: 7 G/DL (ref 6–8.2)
RBC # BLD AUTO: 4.51 M/UL (ref 4.2–5.4)
SODIUM SERPL-SCNC: 129 MMOL/L (ref 135–145)
WBC # BLD AUTO: 7.2 K/UL (ref 4.8–10.8)

## 2024-03-13 DIAGNOSIS — E87.1 HYPONATREMIA: ICD-10-CM

## 2024-03-13 NOTE — PROGRESS NOTES
Recent labs show sodium of 129. Medication list does not show diuretic or SSRI. Repeat BMP in 1 week.

## 2024-03-20 ENCOUNTER — HOSPITAL ENCOUNTER (INPATIENT)
Facility: MEDICAL CENTER | Age: 89
LOS: 1 days | DRG: 641 | End: 2024-03-21
Attending: EMERGENCY MEDICINE | Admitting: HOSPITALIST
Payer: MEDICARE

## 2024-03-20 ENCOUNTER — APPOINTMENT (OUTPATIENT)
Dept: RADIOLOGY | Facility: MEDICAL CENTER | Age: 89
DRG: 641 | End: 2024-03-20
Attending: EMERGENCY MEDICINE
Payer: MEDICARE

## 2024-03-20 DIAGNOSIS — E87.1 HYPONATREMIA: ICD-10-CM

## 2024-03-20 DIAGNOSIS — R53.1 WEAKNESS: ICD-10-CM

## 2024-03-20 DIAGNOSIS — R19.7 DIARRHEA, UNSPECIFIED TYPE: ICD-10-CM

## 2024-03-20 DIAGNOSIS — E86.0 DEHYDRATION: ICD-10-CM

## 2024-03-20 LAB
ALBUMIN SERPL BCP-MCNC: 3.4 G/DL (ref 3.2–4.9)
ALBUMIN/GLOB SERPL: 1.1 G/DL
ALP SERPL-CCNC: 55 U/L (ref 30–99)
ALT SERPL-CCNC: 22 U/L (ref 2–50)
ANION GAP SERPL CALC-SCNC: 12 MMOL/L (ref 7–16)
APPEARANCE UR: ABNORMAL
AST SERPL-CCNC: 39 U/L (ref 12–45)
BACTERIA #/AREA URNS HPF: ABNORMAL /HPF
BASOPHILS # BLD AUTO: 0.2 % (ref 0–1.8)
BASOPHILS # BLD: 0.02 K/UL (ref 0–0.12)
BILIRUB SERPL-MCNC: 0.4 MG/DL (ref 0.1–1.5)
BILIRUB UR QL STRIP.AUTO: NEGATIVE
BUN SERPL-MCNC: 31 MG/DL (ref 8–22)
CA-I SERPL-SCNC: 1 MMOL/L (ref 1.1–1.3)
CALCIUM ALBUM COR SERPL-MCNC: 8.2 MG/DL (ref 8.5–10.5)
CALCIUM SERPL-MCNC: 7.7 MG/DL (ref 8.5–10.5)
CHLORIDE SERPL-SCNC: 97 MMOL/L (ref 96–112)
CK SERPL-CCNC: 252 U/L (ref 0–154)
CO2 SERPL-SCNC: 17 MMOL/L (ref 20–33)
COLOR UR: YELLOW
CREAT SERPL-MCNC: 0.82 MG/DL (ref 0.5–1.4)
EOSINOPHIL # BLD AUTO: 0 K/UL (ref 0–0.51)
EOSINOPHIL NFR BLD: 0 % (ref 0–6.9)
EPI CELLS #/AREA URNS HPF: NEGATIVE /HPF
ERYTHROCYTE [DISTWIDTH] IN BLOOD BY AUTOMATED COUNT: 46.4 FL (ref 35.9–50)
EXTRA TUBE BLU BLU: NORMAL
FLUAV RNA SPEC QL NAA+PROBE: NEGATIVE
FLUBV RNA SPEC QL NAA+PROBE: NEGATIVE
GFR SERPLBLD CREATININE-BSD FMLA CKD-EPI: 69 ML/MIN/1.73 M 2
GLOBULIN SER CALC-MCNC: 3 G/DL (ref 1.9–3.5)
GLUCOSE SERPL-MCNC: 140 MG/DL (ref 65–99)
GLUCOSE UR STRIP.AUTO-MCNC: NEGATIVE MG/DL
HCT VFR BLD AUTO: 37.8 % (ref 37–47)
HGB BLD-MCNC: 12.5 G/DL (ref 12–16)
HYALINE CASTS #/AREA URNS LPF: ABNORMAL /LPF
IMM GRANULOCYTES # BLD AUTO: 0.04 K/UL (ref 0–0.11)
IMM GRANULOCYTES NFR BLD AUTO: 0.5 % (ref 0–0.9)
KETONES UR STRIP.AUTO-MCNC: ABNORMAL MG/DL
LEUKOCYTE ESTERASE UR QL STRIP.AUTO: ABNORMAL
LIPASE SERPL-CCNC: 27 U/L (ref 11–82)
LYMPHOCYTES # BLD AUTO: 0.23 K/UL (ref 1–4.8)
LYMPHOCYTES NFR BLD: 2.8 % (ref 22–41)
MAGNESIUM SERPL-MCNC: 2.5 MG/DL (ref 1.5–2.5)
MCH RBC QN AUTO: 29.4 PG (ref 27–33)
MCHC RBC AUTO-ENTMCNC: 33.1 G/DL (ref 32.2–35.5)
MCV RBC AUTO: 88.9 FL (ref 81.4–97.8)
MICRO URNS: ABNORMAL
MONOCYTES # BLD AUTO: 0.74 K/UL (ref 0–0.85)
MONOCYTES NFR BLD AUTO: 9.1 % (ref 0–13.4)
NEUTROPHILS # BLD AUTO: 7.1 K/UL (ref 1.82–7.42)
NEUTROPHILS NFR BLD: 87.4 % (ref 44–72)
NITRITE UR QL STRIP.AUTO: POSITIVE
NRBC # BLD AUTO: 0 K/UL
NRBC BLD-RTO: 0 /100 WBC (ref 0–0.2)
NT-PROBNP SERPL IA-MCNC: 2789 PG/ML (ref 0–125)
PH UR STRIP.AUTO: 6 [PH] (ref 5–8)
PLATELET # BLD AUTO: 130 K/UL (ref 164–446)
PMV BLD AUTO: 10.6 FL (ref 9–12.9)
POTASSIUM SERPL-SCNC: 3.5 MMOL/L (ref 3.6–5.5)
PROT SERPL-MCNC: 6.4 G/DL (ref 6–8.2)
PROT UR QL STRIP: NEGATIVE MG/DL
RBC # BLD AUTO: 4.25 M/UL (ref 4.2–5.4)
RBC # URNS HPF: ABNORMAL /HPF
RBC UR QL AUTO: ABNORMAL
RSV RNA SPEC QL NAA+PROBE: NEGATIVE
SARS-COV-2 RNA RESP QL NAA+PROBE: NOTDETECTED
SODIUM SERPL-SCNC: 126 MMOL/L (ref 135–145)
SP GR UR STRIP.AUTO: 1.01
SPECIMEN SOURCE: NORMAL
TSH SERPL DL<=0.005 MIU/L-ACNC: 1.37 UIU/ML (ref 0.38–5.33)
UROBILINOGEN UR STRIP.AUTO-MCNC: 0.2 MG/DL
WBC # BLD AUTO: 8.1 K/UL (ref 4.8–10.8)
WBC #/AREA URNS HPF: ABNORMAL /HPF

## 2024-03-20 PROCEDURE — 99222 1ST HOSP IP/OBS MODERATE 55: CPT | Mod: AI | Performed by: HOSPITALIST

## 2024-03-20 PROCEDURE — 82550 ASSAY OF CK (CPK): CPT

## 2024-03-20 PROCEDURE — 80053 COMPREHEN METABOLIC PANEL: CPT

## 2024-03-20 PROCEDURE — 770006 HCHG ROOM/CARE - MED/SURG/GYN SEMI*

## 2024-03-20 PROCEDURE — 81001 URINALYSIS AUTO W/SCOPE: CPT

## 2024-03-20 PROCEDURE — 0241U HCHG SARS-COV-2 COVID-19 NFCT DS RESP RNA 4 TRGT MIC: CPT

## 2024-03-20 PROCEDURE — 71045 X-RAY EXAM CHEST 1 VIEW: CPT

## 2024-03-20 PROCEDURE — 83735 ASSAY OF MAGNESIUM: CPT

## 2024-03-20 PROCEDURE — 94760 N-INVAS EAR/PLS OXIMETRY 1: CPT

## 2024-03-20 PROCEDURE — 36415 COLL VENOUS BLD VENIPUNCTURE: CPT

## 2024-03-20 PROCEDURE — 99285 EMERGENCY DEPT VISIT HI MDM: CPT

## 2024-03-20 PROCEDURE — 700102 HCHG RX REV CODE 250 W/ 637 OVERRIDE(OP): Performed by: HOSPITALIST

## 2024-03-20 PROCEDURE — 87186 SC STD MICRODIL/AGAR DIL: CPT

## 2024-03-20 PROCEDURE — 82330 ASSAY OF CALCIUM: CPT

## 2024-03-20 PROCEDURE — 84443 ASSAY THYROID STIM HORMONE: CPT

## 2024-03-20 PROCEDURE — 83880 ASSAY OF NATRIURETIC PEPTIDE: CPT

## 2024-03-20 PROCEDURE — 83690 ASSAY OF LIPASE: CPT

## 2024-03-20 PROCEDURE — 87077 CULTURE AEROBIC IDENTIFY: CPT

## 2024-03-20 PROCEDURE — 87086 URINE CULTURE/COLONY COUNT: CPT

## 2024-03-20 PROCEDURE — 85025 COMPLETE CBC W/AUTO DIFF WBC: CPT

## 2024-03-20 PROCEDURE — 700101 HCHG RX REV CODE 250: Performed by: HOSPITALIST

## 2024-03-20 PROCEDURE — A9270 NON-COVERED ITEM OR SERVICE: HCPCS | Performed by: HOSPITALIST

## 2024-03-20 PROCEDURE — 700105 HCHG RX REV CODE 258: Performed by: EMERGENCY MEDICINE

## 2024-03-20 RX ORDER — ATORVASTATIN CALCIUM 20 MG/1
20 TABLET, FILM COATED ORAL EVERY EVENING
Status: DISCONTINUED | OUTPATIENT
Start: 2024-03-20 | End: 2024-03-21 | Stop reason: HOSPADM

## 2024-03-20 RX ORDER — SODIUM CHLORIDE, SODIUM LACTATE, POTASSIUM CHLORIDE, CALCIUM CHLORIDE 600; 310; 30; 20 MG/100ML; MG/100ML; MG/100ML; MG/100ML
1000 INJECTION, SOLUTION INTRAVENOUS ONCE
Status: COMPLETED | OUTPATIENT
Start: 2024-03-20 | End: 2024-03-20

## 2024-03-20 RX ORDER — ONDANSETRON 4 MG/1
4 TABLET, ORALLY DISINTEGRATING ORAL EVERY 4 HOURS PRN
Status: DISCONTINUED | OUTPATIENT
Start: 2024-03-20 | End: 2024-03-21 | Stop reason: HOSPADM

## 2024-03-20 RX ORDER — LANOLIN ALCOHOL/MO/W.PET/CERES
400 CREAM (GRAM) TOPICAL DAILY
Status: DISCONTINUED | OUTPATIENT
Start: 2024-03-21 | End: 2024-03-21 | Stop reason: HOSPADM

## 2024-03-20 RX ORDER — GABAPENTIN 300 MG/1
600 CAPSULE ORAL 3 TIMES DAILY
Status: DISCONTINUED | OUTPATIENT
Start: 2024-03-20 | End: 2024-03-21 | Stop reason: HOSPADM

## 2024-03-20 RX ORDER — LABETALOL HYDROCHLORIDE 5 MG/ML
10 INJECTION, SOLUTION INTRAVENOUS EVERY 4 HOURS PRN
Status: DISCONTINUED | OUTPATIENT
Start: 2024-03-20 | End: 2024-03-21 | Stop reason: HOSPADM

## 2024-03-20 RX ORDER — ACETAMINOPHEN 325 MG/1
650 TABLET ORAL EVERY 6 HOURS PRN
Status: DISCONTINUED | OUTPATIENT
Start: 2024-03-20 | End: 2024-03-21 | Stop reason: HOSPADM

## 2024-03-20 RX ORDER — OMEPRAZOLE 20 MG/1
20 CAPSULE, DELAYED RELEASE ORAL EVERY MORNING
Status: DISCONTINUED | OUTPATIENT
Start: 2024-03-20 | End: 2024-03-21 | Stop reason: HOSPADM

## 2024-03-20 RX ORDER — AMLODIPINE BESYLATE 5 MG/1
2.5 TABLET ORAL DAILY
Status: DISCONTINUED | OUTPATIENT
Start: 2024-03-20 | End: 2024-03-21 | Stop reason: HOSPADM

## 2024-03-20 RX ORDER — LIDOCAINE 4 G/G
1 PATCH TOPICAL EVERY 24 HOURS
Status: DISCONTINUED | OUTPATIENT
Start: 2024-03-20 | End: 2024-03-21 | Stop reason: HOSPADM

## 2024-03-20 RX ORDER — ONDANSETRON 2 MG/ML
4 INJECTION INTRAMUSCULAR; INTRAVENOUS EVERY 4 HOURS PRN
Status: DISCONTINUED | OUTPATIENT
Start: 2024-03-20 | End: 2024-03-21 | Stop reason: HOSPADM

## 2024-03-20 RX ORDER — SODIUM CHLORIDE AND POTASSIUM CHLORIDE 150; 900 MG/100ML; MG/100ML
INJECTION, SOLUTION INTRAVENOUS CONTINUOUS
Status: DISCONTINUED | OUTPATIENT
Start: 2024-03-20 | End: 2024-03-21

## 2024-03-20 RX ORDER — SODIUM CHLORIDE 9 MG/ML
1000 INJECTION, SOLUTION INTRAVENOUS ONCE
Status: COMPLETED | OUTPATIENT
Start: 2024-03-20 | End: 2024-03-20

## 2024-03-20 RX ORDER — LOSARTAN POTASSIUM 50 MG/1
50 TABLET ORAL DAILY
Status: DISCONTINUED | OUTPATIENT
Start: 2024-03-20 | End: 2024-03-21 | Stop reason: HOSPADM

## 2024-03-20 RX ORDER — VITAMIN B COMPLEX
2000 TABLET ORAL EVERY MORNING
Status: DISCONTINUED | OUTPATIENT
Start: 2024-03-21 | End: 2024-03-21 | Stop reason: HOSPADM

## 2024-03-20 RX ORDER — CHOLECALCIFEROL (VITAMIN D3) 125 MCG
10 CAPSULE ORAL NIGHTLY
Status: DISCONTINUED | OUTPATIENT
Start: 2024-03-20 | End: 2024-03-21 | Stop reason: HOSPADM

## 2024-03-20 RX ORDER — CALCIUM CARBONATE 500 MG/1
500 TABLET, CHEWABLE ORAL EVERY MORNING
Status: DISCONTINUED | OUTPATIENT
Start: 2024-03-20 | End: 2024-03-21 | Stop reason: HOSPADM

## 2024-03-20 RX ADMIN — ATORVASTATIN CALCIUM 20 MG: 20 TABLET, FILM COATED ORAL at 17:53

## 2024-03-20 RX ADMIN — GABAPENTIN 600 MG: 300 CAPSULE ORAL at 17:53

## 2024-03-20 RX ADMIN — LIDOCAINE 1 PATCH: 4 PATCH TOPICAL at 16:26

## 2024-03-20 RX ADMIN — SODIUM CHLORIDE, POTASSIUM CHLORIDE, SODIUM LACTATE AND CALCIUM CHLORIDE 1000 ML: 600; 310; 30; 20 INJECTION, SOLUTION INTRAVENOUS at 11:12

## 2024-03-20 RX ADMIN — AMLODIPINE BESYLATE 2.5 MG: 5 TABLET ORAL at 16:25

## 2024-03-20 RX ADMIN — OMEPRAZOLE 20 MG: 20 CAPSULE, DELAYED RELEASE ORAL at 16:24

## 2024-03-20 RX ADMIN — SODIUM CHLORIDE 1000 ML: 9 INJECTION, SOLUTION INTRAVENOUS at 12:49

## 2024-03-20 RX ADMIN — ANTACID TABLETS 500 MG: 500 TABLET, CHEWABLE ORAL at 16:25

## 2024-03-20 RX ADMIN — RIVAROXABAN 20 MG: 20 TABLET, FILM COATED ORAL at 17:54

## 2024-03-20 RX ADMIN — Medication 10 MG: at 21:56

## 2024-03-20 RX ADMIN — LOSARTAN POTASSIUM 50 MG: 50 TABLET, FILM COATED ORAL at 16:25

## 2024-03-20 RX ADMIN — POTASSIUM CHLORIDE AND SODIUM CHLORIDE: 900; 150 INJECTION, SOLUTION INTRAVENOUS at 16:28

## 2024-03-20 ASSESSMENT — ENCOUNTER SYMPTOMS
NAUSEA: 0
SORE THROAT: 0
COUGH: 0
SPEECH CHANGE: 0
FOCAL WEAKNESS: 0
MYALGIAS: 0
NERVOUS/ANXIOUS: 0
CHILLS: 0
FEVER: 0
BLURRED VISION: 0
ABDOMINAL PAIN: 0
DIZZINESS: 0
DIARRHEA: 0
SHORTNESS OF BREATH: 0
HEADACHES: 0

## 2024-03-20 ASSESSMENT — FIBROSIS 4 INDEX
FIB4 SCORE: 5.56
FIB4 SCORE: 3.12

## 2024-03-20 ASSESSMENT — COGNITIVE AND FUNCTIONAL STATUS - GENERAL
DAILY ACTIVITIY SCORE: 24
WALKING IN HOSPITAL ROOM: A LITTLE
MOBILITY SCORE: 23
SUGGESTED CMS G CODE MODIFIER MOBILITY: CI
SUGGESTED CMS G CODE MODIFIER DAILY ACTIVITY: CH

## 2024-03-20 ASSESSMENT — PATIENT HEALTH QUESTIONNAIRE - PHQ9
SUM OF ALL RESPONSES TO PHQ9 QUESTIONS 1 AND 2: 0
2. FEELING DOWN, DEPRESSED, IRRITABLE, OR HOPELESS: NOT AT ALL
1. LITTLE INTEREST OR PLEASURE IN DOING THINGS: NOT AT ALL

## 2024-03-20 ASSESSMENT — LIFESTYLE VARIABLES
ALCOHOL_USE: NO
DOES PATIENT WANT TO STOP DRINKING: NO

## 2024-03-20 ASSESSMENT — PAIN DESCRIPTION - PAIN TYPE
TYPE: CHRONIC PAIN
TYPE: CHRONIC PAIN

## 2024-03-20 NOTE — ED TRIAGE NOTES
"Chief Complaint   Patient presents with    Weakness     X3 days, Pt recently prescribed trospium and have had increased weakness and lethargy since.      /50   Pulse 61   Temp 37.7 °C (99.9 °F) (Temporal)   Resp 18   Ht 1.575 m (5' 2\")   Wt 73.9 kg (163 lb)   LMP  (LMP Unknown)   SpO2 93%   BMI 29.81 kg/m²     Pt is alert/oriented and follows commands. Pt speaking in full sentences and responds appropriately to questions. No acute distress noted in triage and respirations are even and unlabored.      Pt placed in lobby and educated on triage process. Pt encouraged to alert staff for any changes in condition.    "

## 2024-03-20 NOTE — H&P
Hospital Medicine History & Physical Note    Date of Service  3/20/2024    Primary Care Physician  KINSEY Horner.      Code Status  DNAR/DNI    Chief Complaint  Chief Complaint   Patient presents with    Weakness     X3 days, Pt recently prescribed trospium and have had increased weakness and lethargy since.        History of Presenting Illness  Marry Mathur is a 88 y.o. female with urinary incontinence, chronic back pain, hx of DVT, HTN, dyslipidemia, protein S deficiency, s/p TAVR, and RA.  She presented 3/20/2024 with increase in weakness after initiation of a medication for her urinary incontinence, trospium.  She stopped it two days ago but remains weak.  Just shortly after stopping her new medication she developed watery non-bloody diarrhea that has been uncontrolled. She normally is active in her garden and gets about with use of a walker but has been mostly resting in bed or walking short distances due to fatigue.  Per ER note she was hypoxic at 83% but during my evaluation I took it off of her and she remained in the mid 90's.  Here in the ER her Na:126, elevated BNP.  She states she has not been eating/drinking as much over the last two days.  She denies any illness symptoms otherwise.    I discussed the plan of care with patient and family.    Review of Systems  Review of Systems   Constitutional:  Positive for malaise/fatigue. Negative for chills and fever.   HENT:  Negative for congestion and sore throat.    Eyes:  Negative for blurred vision.   Respiratory:  Negative for cough and shortness of breath.    Cardiovascular:  Negative for chest pain and leg swelling.   Gastrointestinal:  Negative for abdominal pain, diarrhea and nausea.   Genitourinary:  Negative for dysuria.   Musculoskeletal:  Negative for myalgias.   Neurological:  Negative for dizziness, speech change, focal weakness and headaches.   Psychiatric/Behavioral:  The patient is not nervous/anxious.        Past  Medical History   has a past medical history of Adenocarcinoma of colon ([facility]) (10/30/2018), Anesthesia, Back pain (06/01/2022), Blood clotting disorder ([facility]) (2012), Bowel habit changes, Breath shortness (06/26/2023), Cancer ([facility]), Cataract, Chronic back pain greater than 3 months duration, Deep vein blood clot of left lower extremity ([facility]) (2022), Deep vein thrombosis ([facility]) (03/08/2016), Essential hypertension, benign (06/27/2012), GERD (gastroesophageal reflux disease) (06/27/2012), Heart murmur, High cholesterol, Hyperlipidemia, Hypertension, Impaired fasting glucose (11/02/2017), Melanoma ([facility]), Mild aortic stenosis, Other and unspecified hyperlipidemia (06/27/2012), Prediabetes, Protein S deficiency ([facility]) (11/02/2017), Rheumatoid arthritis involving multiple sites with positive rheumatoid factor ([facility]) (03/14/2016), Rheumatoid nodule ([facility]) (07/25/2017), Right bundle branch block (06/27/2012), and Urinary incontinence (11/02/2017).    Surgical History   has a past surgical history that includes hysterectomy, total abdominal (1970's); arthroplasty (Left); cholecystectomy; other orthopedic surgery (2011); inguinal hernia repair (Right, 09/23/2016); other (08/12/2014); rotator cuff repair (Bilateral); hemicolectomy (Right, 12/13/2018); irrigation & debridement ortho (Bilateral, 07/31/2020); lumbar medial branch blocks (Bilateral, 12/15/2020); wide excision, lesion, head and neck region (Left, 03/29/2022); block epidural steroid injection (Right, 05/10/2022); knee arthroplasty total (Left); block epidural steroid injection (Right, 05/31/2022); pr remove armpits lymph nodes complt (Left, 06/07/2022); pr injection,sacroiliac joint (Right, 07/12/2022); lumbar fusion o-arm (09/21/2022); lumbar decompression (09/21/2022); lumbar laminectomy diskectomy (09/21/2022); foraminotomy (09/21/2022); other (Left); no pertinent past surgical history (2022); transcatheter aortic valve replacement (Bilateral, 9/25/2023);  echocardiogram, transesophageal, intraoperative (N/A, 2023); and pacemaker insertion (Right, 2023).     Family History  family history includes Cancer in her father and mother; Heart Disease in her brother and sister; Leukemia in her father; Other in her son.   Family history reviewed with patient. There is no family history that is pertinent to the chief complaint.     Social History   reports that she has never smoked. She has never used smokeless tobacco. She reports that she does not currently use alcohol. She reports that she does not use drugs. , had one son but he is .  Has a step son.  She is a retired  from [employer].    Allergies  Allergies   Allergen Reactions    Wound Dressing Adhesive      Pt says band-aids cause skin reaction/rash if on for more than 2 days  Paper tape OK         Medications  Prior to Admission Medications   Prescriptions Last Dose Informant Patient Reported? Taking?   Calcium Carbonate 600 MG Tab  Patient Yes No   Sig: Take 600 mg by mouth every morning.   Magnesium 400 MG Tab   Yes No   Sig: Take  by mouth.   Multiple Vitamins-Minerals (PRESERVISION AREDS 2 PO)  Patient Yes No   Sig: Take 1 Capsule by mouth 2 times a day.   SODIUM FLUORIDE 5000 PLUS 1.1 % Cream  Patient Yes No   Sig: BRUSH WITH PEA SIZEED AMOUNT ONCE A DAY PREFERABLY BEFORE BEDTIME. SPIT OUT ALL EXCESS. DO NOT RINSE   acetaminophen (TYLENOL) 500 MG Tab  Patient Yes No   Sig: Take 650 mg by mouth 3 times a day as needed for Mild Pain. 1.5 in am, 1 tab at noon, 2 in pm  Indications: Pain   amLODIPine (NORVASC) 2.5 MG Tab   Yes No   Sig: Take 2.5 mg by mouth every day.   atorvastatin (LIPITOR) 20 MG Tab  Patient No No   Sig: Take 1 Tablet by mouth every evening.   diphenhydrAMINE-APAP, sleep,  MG Tab  Patient Yes No   Sig: Take 2 Tablets by mouth at bedtime.   gabapentin (NEURONTIN) 300 MG Cap  Patient Yes No   Sig: Take 600 mg by mouth 3 times a day.   lidocaine  (LIDODERM) 5 % Patch  Patient Yes No   Sig: APPLY 1 PATCH TO SKIN DAILY FOR 12 HOURS ON THEN 12 HOURS OFF   losartan (COZAAR) 50 MG Tab   No No   Sig: TAKE 1 TABLET BY MOUTH EVERY DAY   melatonin 5 mg Tab  Patient Yes No   Sig: Take 10 mg by mouth every evening.   omeprazole (PRILOSEC) 20 MG delayed-release capsule   No No   Sig: Take 1 Capsule by mouth every morning.   polyethylene glycol/lytes (MIRALAX) 17 g Pack  Patient No No   Sig: Take 1 Packet by mouth 1 time a day as needed (if sennosides and docusate ineffective after 24 hours).   rivaroxaban (XARELTO) 20 MG Tab tablet   No No   Sig: Take 1 Tablet by mouth with dinner.   trospium (SANCTURA) 20 MG Tab   No No   Sig: Take 1 Tablet by mouth 2 times a day.   vitamin D (CHOLECALCIFEROL) 1000 UNIT Tab  Patient Yes No   Sig: Take 1,000 Units by mouth every morning.      Facility-Administered Medications: None       Physical Exam  Temp:  [36.6 °C (97.8 °F)-37.7 °C (99.9 °F)] 36.6 °C (97.8 °F)  Pulse:  [61-81] 75  Resp:  [15-20] 18  BP: (101-138)/(50-65) 128/55  SpO2:  [85 %-99 %] 93 %  Blood Pressure : 138/65   Temperature: 37.7 °C (99.9 °F)   Pulse: 81   Respiration: 18   Pulse Oximetry: 97 %       Physical Exam  Vitals reviewed.   Constitutional:       Appearance: Normal appearance. She is not diaphoretic.   HENT:      Head: Normocephalic and atraumatic.      Nose: Nose normal.      Mouth/Throat:      Mouth: Mucous membranes are moist.      Pharynx: No oropharyngeal exudate.   Eyes:      General: No scleral icterus.        Right eye: No discharge.         Left eye: No discharge.      Extraocular Movements: Extraocular movements intact.      Conjunctiva/sclera: Conjunctivae normal.   Cardiovascular:      Rate and Rhythm: Normal rate and regular rhythm.      Pulses:           Radial pulses are 2+ on the right side and 2+ on the left side.        Dorsalis pedis pulses are 2+ on the right side and 2+ on the left side.      Heart sounds: No murmur heard.  Pulmonary:  "     Effort: Pulmonary effort is normal. No respiratory distress.      Breath sounds: Normal breath sounds. No wheezing, rhonchi or rales.   Abdominal:      General: Bowel sounds are normal. There is no distension.      Palpations: Abdomen is soft.      Tenderness: There is no abdominal tenderness.   Musculoskeletal:         General: No swelling or tenderness.      Cervical back: Neck supple. No muscular tenderness.      Right lower leg: Edema present.      Left lower leg: Edema present.   Lymphadenopathy:      Cervical: No cervical adenopathy.   Skin:     Coloration: Skin is not jaundiced or pale.   Neurological:      General: No focal deficit present.      Mental Status: She is alert and oriented to person, place, and time. Mental status is at baseline.      Cranial Nerves: No cranial nerve deficit.   Psychiatric:         Mood and Affect: Mood normal.         Behavior: Behavior normal.         Laboratory:  Recent Labs     03/20/24  1100   WBC 8.1   RBC 4.25   HEMOGLOBIN 12.5   HEMATOCRIT 37.8   MCV 88.9   MCH 29.4   MCHC 33.1   RDW 46.4   PLATELETCT 130*   MPV 10.6     Recent Labs     03/20/24  1100   SODIUM 126*   POTASSIUM 3.5*   CHLORIDE 97   CO2 17*   GLUCOSE 140*   BUN 31*   CREATININE 0.82   CALCIUM 7.7*     Recent Labs     03/20/24  1100   ALTSGPT 22   ASTSGOT 39   ALKPHOSPHAT 55   TBILIRUBIN 0.4   LIPASE 27   GLUCOSE 140*         Recent Labs     03/20/24  1100   NTPROBNP 2789*         No results for input(s): \"TROPONINT\" in the last 72 hours.    Imaging:  DX-CHEST-PORTABLE (1 VIEW)   Final Result      1.  Persistently enlarged cardiac silhouette   2.  LEFT basilar opacity likely atelectasis rather than pneumonia            Assessment/Plan:  Justification for Admission Status  I anticipate this patient will require at least two midnights for appropriate medical management, necessitating inpatient admission because treatment of hyponatremia and weakness    Patient will need a medical bed on EMERGENCY service " ".  The need is secondary to monitor of slow correction of her Na level and strength/safety for getting about.    * Hyponatremia- (present on admission)  Assessment & Plan  Na:126  She states diarrhea and poor oral intake recently.  Monitor BMP  Giving 1L IV fluids    Weakness  Assessment & Plan  States this started after she initiated a new urinary/bladder medication \"trospium\" (antagonizes acetylcholine receptors; anticholinergic).  She stopped this one day ago.  PT/OT  Gentle fluid hydration with IV NS x 2 liter.  Check TSH    Diarrhea  Assessment & Plan  Imodium prn  No recent antibiotics  IV fluids  Check TSH.    Urge incontinence- (present on admission)  Assessment & Plan  Recently was initiated on trospium but stopped this due to weakness  Recommend avoidance of anticholinergics.  Revisit w/ outpatient urology MD.    S/P TAVR (transcatheter aortic valve replacement)- (present on admission)  Assessment & Plan  Has 1+ bilatera leg edema  Check BNP  Appears dehydrated  Gentle fluid hydration w/ NS IV x 1 liter    Presence of IVC filter- (present on admission)  Assessment & Plan  On rivaroxaban    Protein S deficiency (HCC)- (present on admission)  Assessment & Plan  Rivaroxaban for active treatment        VTE prophylaxis: SCDs/TEDs  "

## 2024-03-20 NOTE — ASSESSMENT & PLAN NOTE
"States this started after she initiated a new urinary/bladder medication \"trospium\" (antagonizes acetylcholine receptors; anticholinergic).  She stopped this one day ago.  PT/OT  Gentle fluid hydration with IV NS x 2 liter.  TSH WNL  "

## 2024-03-20 NOTE — ED PROVIDER NOTES
"  ER Provider Note    Scribed for Jonathan Severino M.d. by Jovana Carter. 3/20/2024  10:40 AM    Primary Care Provider: CRISTINO Horner    CHIEF COMPLAINT  Chief Complaint   Patient presents with    Weakness     X3 days, Pt recently prescribed trospium and have had increased weakness and lethargy since.      EXTERNAL RECORDS REVIEWED  Outpatient Notes Urology clinic note from 2/22/24 reveals the patient has urge incontinence and was prescribed     HPI/ROS  LIMITATION TO HISTORY   Select: : None  OUTSIDE HISTORIAN(S):  None    Marry Mathur is a 88 y.o. female who presents to the ED complaining of weakness onset 3 days ago. The patient explains that since Sunday she has felt weak and \"drained\" of all energy. She has associated intermittent diarrhea and lower abdominal discomfort. She reports that she just began taking trospium for her bladder a few weeks ago but she has since ceased taking it in fear that it was the cause of her lethargy. Denies fever, chills, pain during urination, or cough. The patient denies being on oxygen regularly at home, although she was placed on oxygen in the ED due to desaturation to 83% while she was resting. There are no known alleviating or exacerbating factors. The patient does not have any known allergies to medications.    PAST MEDICAL HISTORY  Past Medical History:   Diagnosis Date    Adenocarcinoma of colon (HCC) 10/30/2018    From sessile serrated polyp 10/2018    Anesthesia     pt states \"one new dr scraped my esophagus when they put the tube in and I started bleeding so they couldn't operate\"     Back pain 06/01/2022    0/10    Blood clotting disorder (HCC) 2012    clot in leg    Bowel habit changes     constipation     Breath shortness 06/26/2023    with exertion    Cancer (HCC)     skin, colon 2018    Cataract     belia IOL     Chronic back pain greater than 3 months duration     Deep vein blood clot of left lower extremity (HCC) 2022    Deep vein " thrombosis (HCC) 03/08/2016    First occurrence in [facility] in late 1970s Second occurrence further up in [facility] in 2012, has been on AC since     Essential hypertension, benign 06/27/2012    GERD (gastroesophageal reflux disease) 06/27/2012    Heart murmur     High cholesterol     Hyperlipidemia     Hypertension     hx of, not currently     Impaired fasting glucose 11/02/2017    Melanoma (HCC)     Mild aortic stenosis     Seeing Dr. Van    Other and unspecified hyperlipidemia 06/27/2012    Prediabetes     Protein S deficiency (HCC) 11/02/2017    Noted in lab work 2013 as a part of work up at [facility]     Rheumatoid arthritis involving multiple sites with positive rheumatoid factor (HCC) 03/14/2016    Dr. Escoto    Rheumatoid nodule (HCC) 07/25/2017    Right bundle branch block 06/27/2012    pt denies     Urinary incontinence 11/02/2017       SURGICAL HISTORY  Past Surgical History:   Procedure Laterality Date    TRANSCATHETER AORTIC VALVE REPLACEMENT Bilateral 9/25/2023    Procedure: TRANSCATHETER AORTIC VALVE REPLACEMENT;  Surgeon: Haseeb Serrano M.D.;  Location: [facility];  Service: Cardiac    ECHOCARDIOGRAM, TRANSESOPHAGEAL, INTRAOPERATIVE N/A 9/25/2023    Procedure: ECHOCARDIOGRAM, TRANSESOPHAGEAL, INTRAOPERATIVE;  Surgeon: Haseeb Serrano M.D.;  Location: [facility];  Service: Cardiac    PACEMAKER INSERTION Right 9/25/2023    Procedure: INSERTION, TEMPORARY CARDIAC PACEMAKER;  Surgeon: Haseeb Serrano M.D.;  Location: [facility];  Service: Cardiac    LUMBAR FUSION O-ARM  09/21/2022    Procedure: L3-4 and L4-5 decompressive laminectomy, bilateral foraminotomies and L3-4-5 posterior lumbar instrumented fusion;  Surgeon: Rosa M Bonner M.D.;  Location: [facility];  Service: Neurosurgery    LUMBAR DECOMPRESSION  09/21/2022    Procedure: DECOMPRESSION, SPINE, LUMBAR;  Surgeon: Rosa M Bonner M.D.;  Location: [facility];  Service: Neurosurgery    LUMBAR LAMINECTOMY  DISKECTOMY  09/21/2022    Procedure: LAMINECTOMY, SPINE, LUMBAR, WITH DISCECTOMY;  Surgeon: Rosa M Bonner M.D.;  Location: SURGERY [facility];  Service: Neurosurgery    FORAMINOTOMY  09/21/2022    Procedure: FORAMINOTOMY, SPINE;  Surgeon: Rosa M Bonner M.D.;  Location: SURGERY [facility];  Service: Neurosurgery    AK INJECTION,SACROILIAC JOINT Right 07/12/2022    Procedure: RIGHT sacroiliac joint injection with fluoroscopic guidance.;  Surgeon: Indira Lee M.D.;  [facility];  Service: Pain Management    AK REMOVE ARMPITS LYMPH NODES COMPLT Left 06/07/2022    Procedure: LYMPHADENECTOMY, AXILLARY;  Surgeon: Bernardino Torres M.D.;  Location: SURGERY [facility] DAY [facility];  Service: General    BLOCK EPIDURAL STEROID INJECTION Right 05/31/2022    Procedure: RIGHT L4-5 and L5-S1 transforaminal epidural steroid injection with fluoroscopic guidance.;  Surgeon: Indira Lee M.D.;  [facility];  Service: Pain Management    BLOCK EPIDURAL STEROID INJECTION Right 05/10/2022    Procedure: Lumbar L4-5 interlaminar epidural steroid injection;  Surgeon: Indira Lee M.D.;  [facility];  Service: Pain Management    WIDE EXCISION, LESION, HEAD AND NECK REGION Left 03/29/2022    Procedure: WIDE EXCISION, LESION, HEAD AND NECK REGION - RADICAL MALIGNANT MELANOMA OF LEFT CHEST;  Surgeon: Bernardino Torres M.D.;  Location: SURGERY SAME DAY [facility];  Service: General    LUMBAR MEDIAL BRANCH BLOCKS Bilateral 12/15/2020    Procedure: BLOCK, NERVE, SPINAL, LUMBAR, POSTERIOR RAMUS, MEDIAL BRANCH;  Surgeon: Chris Cooper M.D.;  Location: [facility];  Service: Pain Management    IRRIGATION & DEBRIDEMENT ORTHO Bilateral 07/31/2020    Procedure: IRRIGATION AND DEBRIDEMENT, WOUND - KNEE;  Surgeon: Roosevelt Saucedo M.D.;  Location: [facility];  Service: Orthopedics    HEMICOLECTOMY Right 12/13/2018    Procedure: OPEN  RIGHT HEMICOLECTOMY;  Surgeon: Dash Bhagat M.D.;  Location: SURGERY [facility];  Service: General    INGUINAL HERNIA REPAIR Right 09/23/2016    Procedure: INGUINAL HERNIA REPAIR - PRIMARY;  Surgeon: Mary Medina M.D.;  Location: SURGERY [facility];  Service:     OTHER  08/12/2014    peroneal nerve surgery Dr. Castro     OTHER ORTHOPEDIC SURGERY  2011    meniscus repair    ARTHROPLASTY Left     hip replacement    CHOLECYSTECTOMY      HYSTERECTOMY, TOTAL ABDOMINAL  1970's    KNEE ARTHROPLASTY TOTAL Left     NO PERTINENT PAST SURGICAL HISTORY  2022    Skin Cancer, Lymph nodes removed    OTHER Left     Upper chest, skin cancer removed    ROTATOR CUFF REPAIR Bilateral        FAMILY HISTORY  Family History   Problem Relation Age of Onset    Cancer Mother         stomach- ruptured appendix    Cancer Father         colon    Leukemia Father         many exposure, worked for [employer] Sister     Heart Disease Brother         s/p stent     Other Son         on blood thinner, unclear etiology    Clotting Disorder Neg Hx        SOCIAL HISTORY   reports that she has never smoked. She has never used smokeless tobacco. She reports that she does not currently use alcohol. She reports that she does not use drugs.    CURRENT MEDICATIONS  Current Outpatient Medications   Medication Instructions    acetaminophen (TYLENOL) 650 mg, Oral, 3 TIMES DAILY PRN, 1.5 in am, 1 tab at noon, 2 in pm    amLODIPine (NORVASC) 2.5 mg, Oral, DAILY    atorvastatin (LIPITOR) 20 mg, Oral, EVERY EVENING    Calcium Carbonate 600 mg, Oral, EVERY MORNING    diphenhydrAMINE-APAP, sleep,  MG Tab 2 Tablets, Oral, EVERY BEDTIME    gabapentin (NEURONTIN) 600 mg, Oral, 3 TIMES DAILY    lidocaine (LIDODERM) 5 % Patch APPLY 1 PATCH TO SKIN DAILY FOR 12 HOURS ON THEN 12 HOURS OFF    losartan (COZAAR) 50 mg, Oral, EVERY DAY    Magnesium 400 MG Tab Oral    melatonin 10 mg, Oral, NIGHTLY    Multiple Vitamins-Minerals  "(PRESERVISION AREDS 2 PO) 1 Capsule, Oral, 2 TIMES DAILY    omeprazole (PRILOSEC) 20 mg, Oral, EVERY MORNING    polyethylene glycol/lytes (MIRALAX) 17 g Pack 1 Packet, Oral, 1 TIME DAILY PRN    rivaroxaban (XARELTO) 20 mg, Oral, WITH PM MEAL    SODIUM FLUORIDE 5000 PLUS 1.1 % Cream BRUSH WITH PEA SIZEED AMOUNT ONCE A DAY PREFERABLY BEFORE BEDTIME. SPIT OUT ALL EXCESS. DO NOT RINSE    trospium (SANCTURA) 20 mg, Oral, 2 TIMES DAILY    vitamin D (CHOLECALCIFEROL) 1,000 Units, Oral, EVERY MORNING      ALLERGIES  Wound dressing adhesive    PHYSICAL EXAM  /50   Pulse 61   Temp 37.7 °C (99.9 °F) (Temporal)   Resp 18   Ht 1.575 m (5' 2\")   Wt 73.9 kg (163 lb)   LMP  (LMP Unknown)   SpO2 93%   BMI 29.81 kg/m²     Constitutional: Well developed, No acute distress, appears weak and ill.   HENT: Normocephalic, Atraumatic, Bilateral external ears normal, Oropharynx is clear. No oral exudates or nasal discharge. Dry mucous membranes.  Eyes: Pupils are equal round and reactive, EOMI, Conjunctiva normal, No discharge.   Neck: Normal range of motion, No tenderness, Supple, No stridor. No meningismus.  Lymphatic: No lymphadenopathy noted.   Cardiovascular: Regular rate and rhythm without murmur rub or gallop.  Thorax & Lungs: Clear breath sounds bilaterally without wheezes, rhonchi or rales. There is no chest wall tenderness.   Abdomen:  Lower quadrant greater than upper quadrant tenderness, Soft, non-distended. There is no rebound or guarding. No organomegaly is appreciated. Bowel sounds are normal.  Skin: Normal without rash.   Back: No CVA or spinal tenderness.   Extremities: Intact distal pulses, No edema, No tenderness, No cyanosis, No clubbing. Capillary refill is less than 2 seconds.  Musculoskeletal: Good range of motion in all major joints. No tenderness to palpation or major deformities noted.   Neurologic: Alert & oriented x 3, Normal motor function, Normal sensory function, No focal deficits noted. Reflexes " are normal.  Psychiatric: Affect normal, Judgment normal, Mood normal. There is no suicidal ideation or patient reported hallucinations.      DIAGNOSTIC STUDIES    Labs:   Labs Reviewed   CBC WITH DIFFERENTIAL - Abnormal; Notable for the following components:       Result Value    Platelet Count 130 (*)     Neutrophils-Polys 87.40 (*)     Lymphocytes 2.80 (*)     Lymphs (Absolute) 0.23 (*)     All other components within normal limits   COMP METABOLIC PANEL - Abnormal; Notable for the following components:    Sodium 126 (*)     Potassium 3.5 (*)     Co2 17 (*)     Glucose 140 (*)     Bun 31 (*)     Calcium 7.7 (*)     Correct Calcium 8.2 (*)     All other components within normal limits   LIPASE   COV-2, FLU A/B, AND RSV BY PCR (CEPMemopalID)   EXTRA TUBE,BECKIE   ESTIMATED GFR   URINALYSIS,CULTURE IF INDICATED      Radiology:   The attending emergency physician has independently interpreted the diagnostic imaging associated with this visit and am waiting the final reading from the radiologist.   Preliminary interpretation is a follows: Cardiomegaly but no evidence of edema    Radiologist interpretation:   DX-CHEST-PORTABLE (1 VIEW)   Final Result      1.  Persistently enlarged cardiac silhouette   2.  LEFT basilar opacity likely atelectasis rather than pneumonia         COURSE & MEDICAL DECISION MAKING     ED Observation Status? No; Patient does not meet criteria for ED Observation.     INITIAL ASSESSMENT, COURSE AND PLAN  Care Narrative:     10:40 AM - Patient presents to the ED with weakness. Patient will be treated with LR bolus. Ordered for labs and imaging to evaluate her symptoms.     Laboratory evaluation reveals no leukocytosis, 87% PMN shift, low sodium at 126 which is down from 129 and certainly way down from her normal of around mid 130s back in September.  She does have a bicarb of 17 today with a BUN elevated at 31.  BNP today is 2789 but she does not have any clinical evidence of pulmonary edema    12:45 PM -  Patient was reevaluated at bedside. Discussed lab and radiology results with the patient and informed them that there is evidence for hyponatremia and the patient will need to be hospitalized. The patient had the opportunity to ask any questions. The plan for hospitalization was discussed with the patient given their current presentation and diagnostic study results. The patient is understanding and agreeable to the plan for hospitalization.      12:58 PM I discussed the patient's case and the above findings with Dr. Sultana (Hospitalist) who agrees to evaluate the patient for hospitalization.     HYDRATION: Based on the patient's presentation of Dehydration the patient was given IV fluids. IV Hydration was used because oral hydration was not adequate alone. Upon recheck following hydration, the patient was feeling improved.      DISPOSITION AND DISCUSSIONS  I have discussed management of the patient with the following physicians and HAYLEE's:  Dr. Sultana (Hospitalist)     Discussion of management with other [facility] or appropriate source(s): None     Barriers to care at this time, including but not limited to:  None .     DISPOSITION:  Patient will be hospitalized by Dr. Sultana in guarded condition.  I think her weakness is caused from hyponatremia we will have to gingerly rehydrate given her BNP elevation may be due to some Lasix in between    FINAL DIAGNOSIS  1. Weakness    2. Hyponatremia    3. Dehydration         Jovana AGARWAL (Scribe), am scribing for, and in the presence of, Jonathan Severino M.D..    Electronically signed by: Jovana Carter (Manpreetibe), 3/20/2024    Jonathan AGARWAL M.D. personally performed the services described in this documentation, as scribed by Jovana Carter in my presence, and it is both accurate and complete.      The note accurately reflects work and decisions made by me.  Jonathan Severino M.D.  3/20/2024  2:11 PM

## 2024-03-20 NOTE — PROGRESS NOTES
Pt arrived on unit via transport. Pt is A&O 4 on RA. Pt transferred from stretcher to bed and bed alarm placed. Bed is locked and in low position. Unit orientation complete and call light is within reach. Pt ambulated to BR with  socks on.

## 2024-03-20 NOTE — ASSESSMENT & PLAN NOTE
Recently was initiated on trospium but stopped this due to weakness  Recommend avoidance of anticholinergics.  Revisit w/ outpatient urology MD.

## 2024-03-20 NOTE — ED NOTES
Assist RN: PIV placed, blood drawn and sent to lab.  
MD at bedside.   
Med rec is complete per Patient at bedside.  Family/friend/caregiver present at time of interview with permission from Patient.    Allergies reviewed.    Has patient had any outside antibiotics in the last 30 days? N    Any Anticoagulants (rivaroxaban, apixaban, edoxaban, dabigatran, warfarin, enoxaparin) taken in the last 14 days? Y  Anticoagulant name: Xarelto, Last dose: 3/19/24 @20:30 pm.        Pharmacy/Pharmacies Pt utilizes : [facility] [phone]         
Medicated patient per MAR. Covid swab delivered to lab. Aware of need for urine sample. Patient denies further needs. Call light within reach.  
Medicated patient per MAR. Patient denies further needs. Call light within reach.  at bedside.  
Patient noted to have oxygen saturation 85% on room air. Patient placed on 2 L nasal cannula. Oxygen saturation 94% at this time.  
Patient to bathroom in wheelchair. Urine sample obtained and sent to lab.   
Patient to bathroom with VIPUL Hallman to provide urine sample.   
Patient was unable to provide urine sample at this time. ERP notified.  
Phone report to [phone] MARCO Fay. Patient transport at bedside transporting patient to room with chart and all belongings. Patient care transferred.   
triple lumen

## 2024-03-21 ENCOUNTER — PHARMACY VISIT (OUTPATIENT)
Dept: PHARMACY | Facility: MEDICAL CENTER | Age: 89
End: 2024-03-21
Payer: COMMERCIAL

## 2024-03-21 VITALS
WEIGHT: 163 LBS | TEMPERATURE: 98.7 F | HEART RATE: 75 BPM | HEIGHT: 62 IN | SYSTOLIC BLOOD PRESSURE: 106 MMHG | OXYGEN SATURATION: 92 % | BODY MASS INDEX: 30 KG/M2 | RESPIRATION RATE: 18 BRPM | DIASTOLIC BLOOD PRESSURE: 52 MMHG

## 2024-03-21 LAB
ANION GAP SERPL CALC-SCNC: 10 MMOL/L (ref 7–16)
BUN SERPL-MCNC: 19 MG/DL (ref 8–22)
CALCIUM SERPL-MCNC: 7.5 MG/DL (ref 8.5–10.5)
CHLORIDE SERPL-SCNC: 107 MMOL/L (ref 96–112)
CO2 SERPL-SCNC: 16 MMOL/L (ref 20–33)
CREAT SERPL-MCNC: 0.51 MG/DL (ref 0.5–1.4)
ERYTHROCYTE [DISTWIDTH] IN BLOOD BY AUTOMATED COUNT: 47 FL (ref 35.9–50)
GFR SERPLBLD CREATININE-BSD FMLA CKD-EPI: 90 ML/MIN/1.73 M 2
GLUCOSE SERPL-MCNC: 102 MG/DL (ref 65–99)
HCT VFR BLD AUTO: 35.1 % (ref 37–47)
HGB BLD-MCNC: 11.8 G/DL (ref 12–16)
MCH RBC QN AUTO: 29.8 PG (ref 27–33)
MCHC RBC AUTO-ENTMCNC: 33.6 G/DL (ref 32.2–35.5)
MCV RBC AUTO: 88.6 FL (ref 81.4–97.8)
PLATELET # BLD AUTO: 112 K/UL (ref 164–446)
PMV BLD AUTO: 10.6 FL (ref 9–12.9)
POTASSIUM SERPL-SCNC: 4 MMOL/L (ref 3.6–5.5)
RBC # BLD AUTO: 3.96 M/UL (ref 4.2–5.4)
SODIUM SERPL-SCNC: 133 MMOL/L (ref 135–145)
WBC # BLD AUTO: 5.7 K/UL (ref 4.8–10.8)

## 2024-03-21 PROCEDURE — 700102 HCHG RX REV CODE 250 W/ 637 OVERRIDE(OP): Performed by: HOSPITALIST

## 2024-03-21 PROCEDURE — A9270 NON-COVERED ITEM OR SERVICE: HCPCS | Performed by: HOSPITALIST

## 2024-03-21 PROCEDURE — 80048 BASIC METABOLIC PNL TOTAL CA: CPT

## 2024-03-21 PROCEDURE — RXMED WILLOW AMBULATORY MEDICATION CHARGE: Performed by: GENERAL PRACTICE

## 2024-03-21 PROCEDURE — 85027 COMPLETE CBC AUTOMATED: CPT

## 2024-03-21 PROCEDURE — A9270 NON-COVERED ITEM OR SERVICE: HCPCS

## 2024-03-21 PROCEDURE — 700101 HCHG RX REV CODE 250: Performed by: HOSPITALIST

## 2024-03-21 PROCEDURE — 99239 HOSP IP/OBS DSCHRG MGMT >30: CPT | Performed by: GENERAL PRACTICE

## 2024-03-21 PROCEDURE — 36415 COLL VENOUS BLD VENIPUNCTURE: CPT

## 2024-03-21 PROCEDURE — 700102 HCHG RX REV CODE 250 W/ 637 OVERRIDE(OP)

## 2024-03-21 RX ORDER — LOPERAMIDE HYDROCHLORIDE 2 MG/1
2 CAPSULE ORAL 4 TIMES DAILY PRN
Qty: 30 CAPSULE | Refills: 0 | Status: ON HOLD | OUTPATIENT
Start: 2024-03-21 | End: 2024-04-08

## 2024-03-21 RX ORDER — LOPERAMIDE HYDROCHLORIDE 2 MG/1
2 CAPSULE ORAL 4 TIMES DAILY PRN
Status: DISCONTINUED | OUTPATIENT
Start: 2024-03-21 | End: 2024-03-21 | Stop reason: HOSPADM

## 2024-03-21 RX ADMIN — LOPERAMIDE HYDROCHLORIDE 2 MG: 2 CAPSULE ORAL at 01:08

## 2024-03-21 RX ADMIN — Medication 400 MG: at 05:04

## 2024-03-21 RX ADMIN — GABAPENTIN 600 MG: 300 CAPSULE ORAL at 05:03

## 2024-03-21 RX ADMIN — ACETAMINOPHEN 650 MG: 325 TABLET, FILM COATED ORAL at 05:07

## 2024-03-21 RX ADMIN — OMEPRAZOLE 20 MG: 20 CAPSULE, DELAYED RELEASE ORAL at 05:03

## 2024-03-21 RX ADMIN — LOSARTAN POTASSIUM 50 MG: 50 TABLET, FILM COATED ORAL at 05:03

## 2024-03-21 RX ADMIN — ANTACID TABLETS 500 MG: 500 TABLET, CHEWABLE ORAL at 05:03

## 2024-03-21 RX ADMIN — Medication 2000 UNITS: at 05:04

## 2024-03-21 RX ADMIN — POTASSIUM CHLORIDE AND SODIUM CHLORIDE: 900; 150 INJECTION, SOLUTION INTRAVENOUS at 01:09

## 2024-03-21 RX ADMIN — AMLODIPINE BESYLATE 2.5 MG: 5 TABLET ORAL at 05:04

## 2024-03-21 NOTE — CARE PLAN
The patient is Watcher - Medium risk of patient condition declining or worsening    Shift Goals  Clinical Goals: IVF's; monitor blood work  Patient Goals: comfort    Progress made toward(s) clinical / shift goals:  Discussed with the patient her care plan such as blood work and IVF's; pt has 3 episodes of diarrhea, watery yellow liquid stool, medicated with Loperamide accordingly.    Patient is not progressing towards the following goals:      Problem: Knowledge Deficit - Standard  Goal: Patient and family/care givers will demonstrate understanding of plan of care, disease process/condition, diagnostic tests and medications  Outcome: Not Met

## 2024-03-21 NOTE — PROGRESS NOTES
4 Eyes Skin Assessment Completed by Remedios RN and MARCO Fay.    Head WDL  Ears WDL  Nose WDL  Mouth WDL  Neck WDL  Breast/Chest WDL  Shoulder Blades WDL  Spine WDL  (R) Arm/Elbow/Hand WDL  (L) Arm/Elbow/Hand WDL  Abdomen WDL  Groin WDL  Scrotum/Coccyx/Buttocks WDL  (R) Leg Scar  (L) Leg WDL  (R) Heel/Foot/Toe Redness and Blanching  (L) Heel/Foot/Toe Redness and Blanching          Devices In Places Blood Pressure Cuff      Interventions In Place Barrier Cream    Possible Skin Injury No    Pictures Uploaded Into Epic Yes  Wound Consult Placed N/A  RN Wound Prevention Protocol Ordered No

## 2024-03-21 NOTE — FACE TO FACE
Face to Face Note  -  Durable Medical Equipment    Adrienne Sunshine D.O. - NPI: [id number]  I certify that this patient is under my care and that they have had a durable medical equipment(DME)face to face encounter by myself that meets the physician DME face-to-face encounter requirements with this patient on:    Date of encounter:   Patient:                    MRN:                       YOB: 2023  Marry Swanway  [id number]  1935     The encounter with the patient was in whole, or in part, for the following medical condition, which is the primary reason for durable medical equipment:  Other - Debility    I certify that, based on my findings, the following durable medical equipment is medically necessary:  Walkers.        ------------------------------------------------------------------------------------------------------------------    Face to Face Supporting Documentation - Home Health    The encounter with this patient was in whole or in part the primary reason for home health admission.    Date of encounter:   Patient:                    MRN:                       YOB: 2023  Marry Mathur  [id number]  1935     Home health to see patient for:  Skilled Nursing care for assessment, interventions & education, Home health aide, Physical Therapy evaluation and treatment, and Occupational therapy evaluation and treatment    Skilled need for:  New Onset Medical Diagnosis Weakness, Debility    Skilled nursing interventions to include:  Comment: HH/PT/OT    Homebound evidenced status by:  Need the aid of supportive devices such as crutches, canes, wheelchairs or walkers. Leaving home must require a considerable and taxing effort. There must exist a normal inability to leave the home.    Community Physician to provide follow up care: CRISTINO Horner     Optional Interventions    Wound information & treatment:    Home Infusion Therapy  orders:    Line/Drain/Airway:    I certify the face to face encounter for this home care referral meets the CMS requirements and the encounter/clinical assessment with the patient was, in whole, or in part, for the medical condition(s) listed above, which is the primary reason for home health care. Based on my clinical findings: the service(s) are medically necessary, support the need for home health care, and the homebound criteria are met.  I certify that this patient has had a face to face encounter by myself.  Adrienne Sunshine D.O. - NPI: [id number]    *Debility, frailty and advanced age in the absence of an acute deterioration or exacerbation of a condition do not qualify a patient for home health.

## 2024-03-21 NOTE — HOSPITAL COURSE
This is an 88-year-old female with past medical history of TAVR, hypertension, dyslipidemia, protein S deficiency on Xarelto, history of DVT, and urinary incontinence who was admitted on 3/20/2024 with weakness and diarrhea.    Patient was recently started on medication for urinary incontinence, resulted in weakness and uncontrollable diarrhea.  Medication discontinued, recommend patient follow-up with urology outpatient.  Patient has follow-up in early April with urology.    Labs on admission significant for hyponatremia at 126 given gentle IV fluids with complete resolution. Patient stopped taking Sanctura Tuesday and her symptoms started to subside.  Patient with no further episodes of diarrhea. She reports her weakness has subsided.

## 2024-03-21 NOTE — DISCHARGE PLANNING
Case Management Discharge Planning    Admission Date: 3/20/2024  GMLOS: 2.6  ALOS: 1    6-Clicks ADL Score: 24  6-Clicks Mobility Score: 23      Anticipated Discharge Dispo: Discharge Disposition: D/T to home under [facility] care in anticipation of covered skilled care (06)    DME Needed: Yes    DME Ordered: Yes    Action(s) Taken: LMSW met with pt at bedside to complete IMM. Pt made choice for a walker through [facility] and [facility]  as number one choice and [facility] as number two choice for HH. Choice documents and IMM scanned to [facility].     Addendum @ 1219: LMSW updated by [facility]. [facility] accepting pt onto service. Pt has walker at bedside.     Escalations Completed: None    Medically Clear: Yes    Next Steps: Pt to DC with family once they bring her clothing.     Barriers to Discharge: Transportation    Is the patient up for discharge tomorrow: No

## 2024-03-21 NOTE — DISCHARGE PLANNING
Received Choice form at 1006  Agency/Facility Name: [facility]  Referral sent per Choice form @ 1044      [facility] declined.  Non-contracted insurance provider.    @1144  DPA faxed referral to [facility].    Received Choice form at 1004  Agency/Facility Name: [facility]  Referral sent per Choice form @ 3980

## 2024-03-21 NOTE — DISCHARGE SUMMARY
Discharge Summary    CHIEF COMPLAINT ON ADMISSION  Chief Complaint   Patient presents with    Weakness     X3 days, Pt recently prescribed trospium and have had increased weakness and lethargy since.        Reason for Admission  other     Admission Date  3/20/2024    CODE STATUS  DNAR/DNI    HPI & HOSPITAL COURSE  This is an 88-year-old female with past medical history of TAVR, hypertension, dyslipidemia, protein S deficiency on Xarelto, history of DVT, and urinary incontinence who was admitted on 3/20/2024 with weakness and diarrhea.    Patient was recently started on medication for urinary incontinence, resulted in weakness and uncontrollable diarrhea.  Medication discontinued, recommend patient follow-up with urology outpatient.  Patient has follow-up in early April with urology.    Labs on admission significant for hyponatremia at 126 given gentle IV fluids with complete resolution. Patient stopped taking Sanctura Tuesday and her symptoms started to subside.  Patient with no further episodes of diarrhea. She reports her weakness has subsided.      Therefore, she is discharged in good and stable condition to home with organized home healthcare and close outpatient follow-up.    The patient recovered much more quickly than anticipated on admission.    Discharge Date  3/21/2024    FOLLOW UP ITEMS POST DISCHARGE  Primary care physician  Urology    DISCHARGE DIAGNOSES  Principal Problem:    Hyponatremia (POA: Yes)  Active Problems:    Weakness (POA: Unknown)    Urge incontinence (POA: Yes)    Rheumatoid arthritis involving multiple sites with positive rheumatoid factor (HCC) (Chronic) (POA: Yes)    Protein S deficiency (HCC) (POA: Yes)    Presence of IVC filter (POA: Yes)    S/P TAVR (transcatheter aortic valve replacement) (POA: Yes)      Overview:  26 mm S3 Ultra Resilia Garza bovine pericardial valve, -1 mL in balloon       on 9/25/2023 under general anesthesia    Diarrhea (POA: Unknown)  Resolved Problems:    *  No resolved hospital problems. *      FOLLOW UP  Future Appointments   Date Time Provider Department Center   4/4/2024  9:30 AM CRISTINO Ulrich RWNURO None   7/22/2024  2:30 PM [facility]   9/13/2024 11:30 AM [place] PHARMACIST [facility]AG[facility] [place]   9/23/2024  1:15 PM [facility] EXAM 9 ECHO [facility]   12/30/2024  1:00 PM Deanna Escoto M.D. MONICANR None     CRIST[facility] Horner  [address] [address]  [phone]    Follow up        MEDICATIONS ON DISCHARGE     Medication List        START taking these medications        Instructions   loperamide 2 MG Caps  Commonly known as: Imodium   Take 1 Capsule by mouth 4 times a day as needed for Diarrhea for up to 7 days.  Dose: 2 mg            CONTINUE taking these medications        Instructions   acetaminophen 500 MG Tabs  Commonly known as: Tylenol   Take 750 mg by mouth 2 times a day as needed for Mild Pain. 1.5 in am, 1 tab at noon, 2 in pm  Indications: Pain  Dose: 750 mg     amLODIPine 2.5 MG Tabs  Commonly known as: Norvasc   Take 2.5 mg by mouth every evening.  Dose: 2.5 mg     atorvastatin 20 MG Tabs  Commonly known as: Lipitor   Take 1 Tablet by mouth every evening.  Dose: 20 mg     Calcium Carbonate 600 MG Tabs   Take 600 mg by mouth every morning.  Dose: 600 mg     diphenhydrAMINE-APAP (sleep)  MG Tabs   Take 2 Tablets by mouth at bedtime.  Dose: 2 Tablet     gabapentin 300 MG Caps  Commonly known as: Neurontin   Take 300 mg by mouth 3 times a day.  Dose: 300 mg     lidocaine 5 % Ptch  Commonly known as: Lidoderm   APPLY 1 PATCH TO SKIN DAILY FOR 12 HOURS ON THEN 12 HOURS OFF     losartan 50 MG Tabs  Commonly known as: Cozaar   Doctor's comments: Needs an appt for further refills  TAKE 1 TABLET BY MOUTH EVERY DAY  Dose: 50 mg     MAGNESIUM PO   Take 1 Tablet by mouth every day.  Dose: 1 Tablet     Melatonin 10 MG Tabs   Take 10 mg by mouth every evening.  Dose: 10 mg     omeprazole 20 MG  delayed-release capsule  Commonly known as: PriLOSEC   Take 1 Capsule by mouth every morning.  Dose: 20 mg     polyethylene glycol/lytes Pack  Commonly known as: Miralax   Take 1 Packet by mouth 1 time a day as needed (if sennosides and docusate ineffective after 24 hours).  Dose: 17 g     PRESERVISION AREDS 2 PO   Take 1 Capsule by mouth 2 times a day.  Dose: 1 Capsule     rivaroxaban 20 MG Tabs tablet  Commonly known as: Xarelto   Doctor's comments: This prescription is transmitted by a pharmacist under the authority of a collaborative practice agreement.  Take 1 Tablet by mouth with dinner.  Dose: 20 mg     Sodium Fluoride 5000 Plus 1.1 % Crea  Generic drug: SODIUM FLUORIDE (DENTAL GEL)   BRUSH WITH PEA SIZEED AMOUNT ONCE A DAY PREFERABLY BEFORE BEDTIME. SPIT OUT ALL EXCESS. DO NOT RINSE     SYSTANE OP   Administer 1 Drop into both eyes 2 times a day as needed.  Dose: 1 Drop     vitamin D 1000 Unit (25 mcg) Tabs  Commonly known as: cholecalciferol   Take 1,000 Units by mouth every morning.  Dose: 1,000 Units            STOP taking these medications      trospium 20 MG Tabs  Commonly known as: Sanctura              Allergies  Allergies   Allergen Reactions    Wound Dressing Adhesive      Pt says band-aids cause skin reaction/rash if on for more than 2 days  Paper tape OK         DIET  Orders Placed This Encounter   Procedures    Diet Order Diet: Cardiac     Standing Status:   Standing     Number of Occurrences:   1     Order Specific Question:   Diet:     Answer:   Cardiac [6]       ACTIVITY  As tolerated.  Weight bearing as tolerated    CONSULTATIONS  None    PROCEDURES  None    LABORATORY  Lab Results   Component Value Date    SODIUM 133 (L) 03/21/2024    POTASSIUM 4.0 03/21/2024    CHLORIDE 107 03/21/2024    CO2 16 (L) 03/21/2024    GLUCOSE 102 (H) 03/21/2024    BUN 19 03/21/2024    CREATININE 0.51 03/21/2024        Lab Results   Component Value Date    WBC 5.7 03/21/2024    HEMOGLOBIN 11.8 (L) 03/21/2024     HEMATOCRIT 35.1 (L) 03/21/2024    PLATELETCT 112 (L) 03/21/2024      DX-CHEST-PORTABLE (1 VIEW)   Final Result      1.  Persistently enlarged cardiac silhouette   2.  LEFT basilar opacity likely atelectasis rather than pneumonia         Total time of the discharge process exceeds 45 minutes.

## 2024-03-21 NOTE — DISCHARGE PLANNING
Care Transition Team Assessment    LMSW met with pt at bedside to complete assessment. Pt A&Ox4 and able to verify the information on the face sheet. Prior to this hospitalization pt was independent at home with ADLs and most IADLs. Pt does not use any DME at baseline. Pt reported that her son is good support for her. Pt is retired and receives SSI monthly deposits. mPt denies any SA or MH concerns. Pt does have an advance directive.     Information Source  Orientation Level: Oriented X4  Information Given By: Patient  Who is responsible for making decisions for patient? : Patient    Readmission Evaluation  Is this a readmission?: No    Elopement Risk  Legal Hold: No  Ambulatory or Self Mobile in Wheelchair: No-Not an Elopement Risk    Interdisciplinary Discharge Planning  Patient or legal guardian wants to designate a caregiver: No    Discharge Preparedness  What is your plan after discharge?: Home with help  What are your discharge supports?: Child  Prior Functional Level: Independent with Activities of Daily Living  Difficulity with ADLs: None  Difficulity with IADLs: None    Functional Assesment  Prior Functional Level: Independent with Activities of Daily Living    Finances  Financial Barriers to Discharge: No  Prescription Coverage: Yes    Vision / Hearing Impairment  Vision Impairment : Yes  Right Eye Vision: Wears Glasses (at home)  Left Eye Vision: Wears Glasses (At home)  Hearing Impairment : No    Advance Directive  Advance Directive?: DPOA for Health Care    Domestic Abuse  Have you ever been the victim of abuse or violence?: No  Physical Abuse or Sexual Abuse: No  Verbal Abuse or Emotional Abuse: No  Possible Abuse/Neglect Reported to:: Not Applicable    Psychological Assessment  History of Substance Abuse: None  History of Psychiatric Problems: No  Non-compliant with Treatment: No  Newly Diagnosed Illness: No    Discharge Risks or Barriers  Discharge risks or barriers?: No  Patient risk factors:  Vulnerable adult    Anticipated Discharge Information  Discharge Disposition: Discharged to home/self care (01)

## 2024-03-22 ENCOUNTER — PATIENT OUTREACH (OUTPATIENT)
Dept: MEDICAL GROUP | Facility: PHYSICIAN GROUP | Age: 89
End: 2024-03-22
Payer: MEDICARE

## 2024-03-22 NOTE — PROGRESS NOTES
Called pt as updated CBC does show declining platelet count - requested she c/b to discuss. Need to r/o any bleeding issues & would like to r/s f/u for 3 months vs 6 months.    Castillo Tomlin, PerlitaD, BCACP

## 2024-03-25 ENCOUNTER — APPOINTMENT (OUTPATIENT)
Dept: RADIOLOGY | Facility: MEDICAL CENTER | Age: 89
DRG: 288 | End: 2024-03-25
Attending: EMERGENCY MEDICINE
Payer: MEDICARE

## 2024-03-25 ENCOUNTER — HOSPITAL ENCOUNTER (INPATIENT)
Facility: MEDICAL CENTER | Age: 89
DRG: 288 | End: 2024-03-25
Attending: EMERGENCY MEDICINE | Admitting: STUDENT IN AN ORGANIZED HEALTH CARE EDUCATION/TRAINING PROGRAM
Payer: MEDICARE

## 2024-03-25 DIAGNOSIS — B95.5 BACTEREMIA DUE TO STREPTOCOCCUS: ICD-10-CM

## 2024-03-25 DIAGNOSIS — N39.0 ACUTE UTI: ICD-10-CM

## 2024-03-25 DIAGNOSIS — I63.9 CEREBROVASCULAR ACCIDENT (CVA), UNSPECIFIED MECHANISM (HCC): ICD-10-CM

## 2024-03-25 DIAGNOSIS — F05 ACUTE CONFUSION DUE TO INFECTION: ICD-10-CM

## 2024-03-25 DIAGNOSIS — R53.1 GENERALIZED WEAKNESS: ICD-10-CM

## 2024-03-25 DIAGNOSIS — R78.81 BACTEREMIA DUE TO STREPTOCOCCUS: ICD-10-CM

## 2024-03-25 PROBLEM — G93.41 ACUTE METABOLIC ENCEPHALOPATHY: Status: ACTIVE | Noted: 2024-03-25

## 2024-03-25 LAB
ALBUMIN SERPL BCP-MCNC: 3.8 G/DL (ref 3.2–4.9)
ALBUMIN/GLOB SERPL: 1.1 G/DL
ALP SERPL-CCNC: 68 U/L (ref 30–99)
ALT SERPL-CCNC: 36 U/L (ref 2–50)
ANION GAP SERPL CALC-SCNC: 13 MMOL/L (ref 7–16)
APPEARANCE UR: CLEAR
AST SERPL-CCNC: 36 U/L (ref 12–45)
BACTERIA #/AREA URNS HPF: ABNORMAL /HPF
BASOPHILS # BLD AUTO: 0.4 % (ref 0–1.8)
BASOPHILS # BLD: 0.04 K/UL (ref 0–0.12)
BILIRUB SERPL-MCNC: 0.6 MG/DL (ref 0.1–1.5)
BILIRUB UR QL STRIP.AUTO: NEGATIVE
BUN SERPL-MCNC: 21 MG/DL (ref 8–22)
CALCIUM ALBUM COR SERPL-MCNC: 8.4 MG/DL (ref 8.5–10.5)
CALCIUM SERPL-MCNC: 8.2 MG/DL (ref 8.5–10.5)
CHLORIDE SERPL-SCNC: 102 MMOL/L (ref 96–112)
CK SERPL-CCNC: 473 U/L (ref 0–154)
CO2 SERPL-SCNC: 18 MMOL/L (ref 20–33)
COLOR UR: YELLOW
CREAT SERPL-MCNC: 0.62 MG/DL (ref 0.5–1.4)
EKG IMPRESSION: NORMAL
EOSINOPHIL # BLD AUTO: 0.01 K/UL (ref 0–0.51)
EOSINOPHIL NFR BLD: 0.1 % (ref 0–6.9)
EPI CELLS #/AREA URNS HPF: ABNORMAL /HPF
ERYTHROCYTE [DISTWIDTH] IN BLOOD BY AUTOMATED COUNT: 47.2 FL (ref 35.9–50)
GFR SERPLBLD CREATININE-BSD FMLA CKD-EPI: 86 ML/MIN/1.73 M 2
GLOBULIN SER CALC-MCNC: 3.6 G/DL (ref 1.9–3.5)
GLUCOSE SERPL-MCNC: 164 MG/DL (ref 65–99)
GLUCOSE UR STRIP.AUTO-MCNC: NEGATIVE MG/DL
HCT VFR BLD AUTO: 42.4 % (ref 37–47)
HGB BLD-MCNC: 14.3 G/DL (ref 12–16)
HYALINE CASTS #/AREA URNS LPF: ABNORMAL /LPF
IMM GRANULOCYTES # BLD AUTO: 0.04 K/UL (ref 0–0.11)
IMM GRANULOCYTES NFR BLD AUTO: 0.4 % (ref 0–0.9)
KETONES UR STRIP.AUTO-MCNC: NEGATIVE MG/DL
LACTATE SERPL-SCNC: 1.3 MMOL/L (ref 0.5–2)
LACTATE SERPL-SCNC: 1.9 MMOL/L (ref 0.5–2)
LEUKOCYTE ESTERASE UR QL STRIP.AUTO: ABNORMAL
LYMPHOCYTES # BLD AUTO: 0.74 K/UL (ref 1–4.8)
LYMPHOCYTES NFR BLD: 6.9 % (ref 22–41)
MCH RBC QN AUTO: 29.5 PG (ref 27–33)
MCHC RBC AUTO-ENTMCNC: 33.7 G/DL (ref 32.2–35.5)
MCV RBC AUTO: 87.6 FL (ref 81.4–97.8)
MICRO URNS: ABNORMAL
MONOCYTES # BLD AUTO: 0.8 K/UL (ref 0–0.85)
MONOCYTES NFR BLD AUTO: 7.5 % (ref 0–13.4)
NEUTROPHILS # BLD AUTO: 9.08 K/UL (ref 1.82–7.42)
NEUTROPHILS NFR BLD: 84.7 % (ref 44–72)
NITRITE UR QL STRIP.AUTO: POSITIVE
NRBC # BLD AUTO: 0 K/UL
NRBC BLD-RTO: 0 /100 WBC (ref 0–0.2)
PH UR STRIP.AUTO: 5.5 [PH] (ref 5–8)
PLATELET # BLD AUTO: 168 K/UL (ref 164–446)
PMV BLD AUTO: 10.6 FL (ref 9–12.9)
POTASSIUM SERPL-SCNC: 4.3 MMOL/L (ref 3.6–5.5)
PROT SERPL-MCNC: 7.4 G/DL (ref 6–8.2)
PROT UR QL STRIP: 100 MG/DL
RBC # BLD AUTO: 4.84 M/UL (ref 4.2–5.4)
RBC # URNS HPF: ABNORMAL /HPF
RBC UR QL AUTO: ABNORMAL
SODIUM SERPL-SCNC: 133 MMOL/L (ref 135–145)
SP GR UR STRIP.AUTO: 1.02
UROBILINOGEN UR STRIP.AUTO-MCNC: 0.2 MG/DL
WBC # BLD AUTO: 10.7 K/UL (ref 4.8–10.8)
WBC #/AREA URNS HPF: ABNORMAL /HPF

## 2024-03-25 PROCEDURE — 87076 CULTURE ANAEROBE IDENT EACH: CPT

## 2024-03-25 PROCEDURE — 87186 SC STD MICRODIL/AGAR DIL: CPT

## 2024-03-25 PROCEDURE — 770006 HCHG ROOM/CARE - MED/SURG/GYN SEMI*

## 2024-03-25 PROCEDURE — 87086 URINE CULTURE/COLONY COUNT: CPT

## 2024-03-25 PROCEDURE — 700105 HCHG RX REV CODE 258: Performed by: EMERGENCY MEDICINE

## 2024-03-25 PROCEDURE — 36415 COLL VENOUS BLD VENIPUNCTURE: CPT

## 2024-03-25 PROCEDURE — 83605 ASSAY OF LACTIC ACID: CPT

## 2024-03-25 PROCEDURE — 93005 ELECTROCARDIOGRAM TRACING: CPT | Performed by: EMERGENCY MEDICINE

## 2024-03-25 PROCEDURE — 87040 BLOOD CULTURE FOR BACTERIA: CPT | Mod: 91

## 2024-03-25 PROCEDURE — 51701 INSERT BLADDER CATHETER: CPT

## 2024-03-25 PROCEDURE — 81001 URINALYSIS AUTO W/SCOPE: CPT

## 2024-03-25 PROCEDURE — 87015 SPECIMEN INFECT AGNT CONCNTJ: CPT

## 2024-03-25 PROCEDURE — 700101 HCHG RX REV CODE 250: Performed by: EMERGENCY MEDICINE

## 2024-03-25 PROCEDURE — 96374 THER/PROPH/DIAG INJ IV PUSH: CPT

## 2024-03-25 PROCEDURE — 70450 CT HEAD/BRAIN W/O DYE: CPT

## 2024-03-25 PROCEDURE — 85025 COMPLETE CBC W/AUTO DIFF WBC: CPT

## 2024-03-25 PROCEDURE — 82550 ASSAY OF CK (CPK): CPT

## 2024-03-25 PROCEDURE — 71045 X-RAY EXAM CHEST 1 VIEW: CPT

## 2024-03-25 PROCEDURE — 99223 1ST HOSP IP/OBS HIGH 75: CPT | Mod: AI,25 | Performed by: STUDENT IN AN ORGANIZED HEALTH CARE EDUCATION/TRAINING PROGRAM

## 2024-03-25 PROCEDURE — A9270 NON-COVERED ITEM OR SERVICE: HCPCS | Performed by: STUDENT IN AN ORGANIZED HEALTH CARE EDUCATION/TRAINING PROGRAM

## 2024-03-25 PROCEDURE — 80053 COMPREHEN METABOLIC PANEL: CPT

## 2024-03-25 PROCEDURE — 700111 HCHG RX REV CODE 636 W/ 250 OVERRIDE (IP): Performed by: EMERGENCY MEDICINE

## 2024-03-25 PROCEDURE — 700102 HCHG RX REV CODE 250 W/ 637 OVERRIDE(OP): Performed by: STUDENT IN AN ORGANIZED HEALTH CARE EDUCATION/TRAINING PROGRAM

## 2024-03-25 PROCEDURE — 87077 CULTURE AEROBIC IDENTIFY: CPT | Mod: 91

## 2024-03-25 PROCEDURE — 99497 ADVNCD CARE PLAN 30 MIN: CPT | Performed by: STUDENT IN AN ORGANIZED HEALTH CARE EDUCATION/TRAINING PROGRAM

## 2024-03-25 PROCEDURE — 700105 HCHG RX REV CODE 258: Performed by: STUDENT IN AN ORGANIZED HEALTH CARE EDUCATION/TRAINING PROGRAM

## 2024-03-25 PROCEDURE — 87181 SC STD AGAR DILUTION PER AGT: CPT

## 2024-03-25 PROCEDURE — 99285 EMERGENCY DEPT VISIT HI MDM: CPT

## 2024-03-25 RX ORDER — AMLODIPINE BESYLATE 2.5 MG/1
2.5 TABLET ORAL EVERY EVENING
Status: DISCONTINUED | OUTPATIENT
Start: 2024-03-25 | End: 2024-04-03

## 2024-03-25 RX ORDER — GABAPENTIN 300 MG/1
300 CAPSULE ORAL 2 TIMES DAILY
Status: DISCONTINUED | OUTPATIENT
Start: 2024-03-25 | End: 2024-03-29

## 2024-03-25 RX ORDER — LOSARTAN POTASSIUM 50 MG/1
50 TABLET ORAL DAILY
Status: DISCONTINUED | OUTPATIENT
Start: 2024-03-26 | End: 2024-04-03

## 2024-03-25 RX ORDER — ATORVASTATIN CALCIUM 20 MG/1
20 TABLET, FILM COATED ORAL EVERY EVENING
Status: DISCONTINUED | OUTPATIENT
Start: 2024-03-25 | End: 2024-04-03

## 2024-03-25 RX ORDER — SODIUM CHLORIDE, SODIUM LACTATE, POTASSIUM CHLORIDE, CALCIUM CHLORIDE 600; 310; 30; 20 MG/100ML; MG/100ML; MG/100ML; MG/100ML
1000 INJECTION, SOLUTION INTRAVENOUS ONCE
Status: COMPLETED | OUTPATIENT
Start: 2024-03-25 | End: 2024-03-25

## 2024-03-25 RX ORDER — SODIUM CHLORIDE 9 MG/ML
INJECTION, SOLUTION INTRAVENOUS CONTINUOUS
Status: DISCONTINUED | OUTPATIENT
Start: 2024-03-25 | End: 2024-03-27

## 2024-03-25 RX ORDER — SODIUM CHLORIDE 9 MG/ML
500 INJECTION, SOLUTION INTRAVENOUS ONCE
Status: COMPLETED | OUTPATIENT
Start: 2024-03-25 | End: 2024-03-25

## 2024-03-25 RX ORDER — ACETAMINOPHEN 325 MG/1
650 TABLET ORAL EVERY 6 HOURS PRN
Status: DISCONTINUED | OUTPATIENT
Start: 2024-03-25 | End: 2024-04-03

## 2024-03-25 RX ADMIN — SODIUM CHLORIDE: 9 INJECTION, SOLUTION INTRAVENOUS at 20:10

## 2024-03-25 RX ADMIN — CEFTRIAXONE SODIUM 1000 MG: 10 INJECTION, POWDER, FOR SOLUTION INTRAVENOUS at 18:06

## 2024-03-25 RX ADMIN — AMLODIPINE BESYLATE 2.5 MG: 5 TABLET ORAL at 20:16

## 2024-03-25 RX ADMIN — ATORVASTATIN CALCIUM 20 MG: 20 TABLET, FILM COATED ORAL at 20:16

## 2024-03-25 RX ADMIN — SODIUM CHLORIDE, POTASSIUM CHLORIDE, SODIUM LACTATE AND CALCIUM CHLORIDE 1000 ML: 600; 310; 30; 20 INJECTION, SOLUTION INTRAVENOUS at 15:40

## 2024-03-25 RX ADMIN — SODIUM CHLORIDE 500 ML: 9 INJECTION, SOLUTION INTRAVENOUS at 18:06

## 2024-03-25 RX ADMIN — GABAPENTIN 300 MG: 300 CAPSULE ORAL at 20:16

## 2024-03-25 ASSESSMENT — COGNITIVE AND FUNCTIONAL STATUS - GENERAL
CLIMB 3 TO 5 STEPS WITH RAILING: A LOT
DRESSING REGULAR UPPER BODY CLOTHING: A LOT
MOVING TO AND FROM BED TO CHAIR: A LOT
PERSONAL GROOMING: A LOT
SUGGESTED CMS G CODE MODIFIER MOBILITY: CL
WALKING IN HOSPITAL ROOM: A LOT
TURNING FROM BACK TO SIDE WHILE IN FLAT BAD: A LOT
DRESSING REGULAR LOWER BODY CLOTHING: A LOT
HELP NEEDED FOR BATHING: A LOT
SUGGESTED CMS G CODE MODIFIER DAILY ACTIVITY: CL
DAILY ACTIVITIY SCORE: 12
TOILETING: A LOT
MOBILITY SCORE: 12
MOVING FROM LYING ON BACK TO SITTING ON SIDE OF FLAT BED: A LOT
EATING MEALS: A LOT
STANDING UP FROM CHAIR USING ARMS: A LOT

## 2024-03-25 ASSESSMENT — LIFESTYLE VARIABLES
CONSUMPTION TOTAL: NEGATIVE
TOTAL SCORE: 0
TOTAL SCORE: 0
HOW MANY TIMES IN THE PAST YEAR HAVE YOU HAD 5 OR MORE DRINKS IN A DAY: 0
EVER HAD A DRINK FIRST THING IN THE MORNING TO STEADY YOUR NERVES TO GET RID OF A HANGOVER: NO
EVER FELT BAD OR GUILTY ABOUT YOUR DRINKING: NO
AVERAGE NUMBER OF DAYS PER WEEK YOU HAVE A DRINK CONTAINING ALCOHOL: 0
ON A TYPICAL DAY WHEN YOU DRINK ALCOHOL HOW MANY DRINKS DO YOU HAVE: 0
TOTAL SCORE: 0
ALCOHOL_USE: NO
HAVE PEOPLE ANNOYED YOU BY CRITICIZING YOUR DRINKING: NO
DOES PATIENT WANT TO STOP DRINKING: NO
HAVE YOU EVER FELT YOU SHOULD CUT DOWN ON YOUR DRINKING: NO

## 2024-03-25 ASSESSMENT — PATIENT HEALTH QUESTIONNAIRE - PHQ9
2. FEELING DOWN, DEPRESSED, IRRITABLE, OR HOPELESS: SEVERAL DAYS
1. LITTLE INTEREST OR PLEASURE IN DOING THINGS: NOT AT ALL
3. TROUBLE FALLING OR STAYING ASLEEP OR SLEEPING TOO MUCH: NOT AT ALL
5. POOR APPETITE OR OVEREATING: SEVERAL DAYS
8. MOVING OR SPEAKING SO SLOWLY THAT OTHER PEOPLE COULD HAVE NOTICED. OR THE OPPOSITE, BEING SO FIGETY OR RESTLESS THAT YOU HAVE BEEN MOVING AROUND A LOT MORE THAN USUAL: SEVERAL DAYS
6. FEELING BAD ABOUT YOURSELF - OR THAT YOU ARE A FAILURE OR HAVE LET YOURSELF OR YOUR FAMILY DOWN: NOT AL ALL
SUM OF ALL RESPONSES TO PHQ9 QUESTIONS 1 AND 2: 1
9. THOUGHTS THAT YOU WOULD BE BETTER OFF DEAD, OR OF HURTING YOURSELF: NOT AT ALL
4. FEELING TIRED OR HAVING LITTLE ENERGY: SEVERAL DAYS
7. TROUBLE CONCENTRATING ON THINGS, SUCH AS READING THE NEWSPAPER OR WATCHING TELEVISION: SEVERAL DAYS
SUM OF ALL RESPONSES TO PHQ QUESTIONS 1-9: 5

## 2024-03-25 ASSESSMENT — FIBROSIS 4 INDEX: FIB4 SCORE: 6.46

## 2024-03-25 NOTE — ED NOTES
"Chief Complaint   Patient presents with    ALOC     Patient found down by neighbors, last seen per EMS was 12-24 hours ago, patient confused, but states she did not fall, states she was going to BR and lie down on ground, reported diarrhea.       /58   Pulse 89   Temp 36.4 °C (97.6 °F) (Temporal)   Resp 16   Ht 1.575 m (5' 2\")   Wt 69.4 kg (153 lb)   LMP  (LMP Unknown)   SpO2 95%   BMI 27.98 kg/m²     BIBA for above, recent admit, see EMR.    "

## 2024-03-25 NOTE — PROGRESS NOTES
Transitional Care Management    Two attempts were made to contact this patient within two business days to review discharge per CMS guidelines, but were unsuccessful.  This patient, however still qualifies for a Transitional Care Management visit as they are being seen within the required time .  You therefore can code and charge appropriately for the TCM.

## 2024-03-25 NOTE — ED PROVIDER NOTES
ED Provider Note    CHIEF COMPLAINT  Chief Complaint   Patient presents with    ALOC     Patient found down by neighbors, last seen per EMS was 12-24 hours ago, patient confused, but states she did not fall, states she was going to BR and lie down on ground, reported diarrhea.         EXTERNAL RECORDS REVIEWED  Discharge summary 4 days ago, 3/21/2024, hyponatremia and generalized weakness    HPI/ROS  LIMITATION TO HISTORY   Select: Altered mental status / Confusion        Marry Mathur is a 88 y.o. female who presents for evaluation of generalized weakness, was found down by neighbors at home.  She reports she did not fall, she states that she had some stool incontinence and went to the bathroom to clean herself up, from there she is a little unclear on how she ended up on the floor but is adamant she did not fall.  She states that she was too weak to get up.  Ultimately EMS reports that neighbors checked on her and found her in this position.  Recent hospitalization with hyponatremia and generalized weakness.  The patient does live alone at home with her dog.  Anticoagulated on Xarelto with a history of protein S deficiency, DVT.  She denies any acute injuries.  Denies headache, no new back or neck pain.  She has no chest or abdominal pain.  No extremity injuries.  She has no other complaints    PAST MEDICAL HISTORY   has a past medical history of Adenocarcinoma of colon ([facility]) (10/30/2018), Anesthesia, Back pain (06/01/2022), Blood clotting disorder ([facility]) (2012), Bowel habit changes, Breath shortness (06/26/2023), Cancer ([facility]), Cataract, Chronic back pain greater than 3 months duration, Deep vein blood clot of left lower extremity ([facility]) (2022), Deep vein thrombosis ([facility]) (03/08/2016), Essential hypertension, benign (06/27/2012), GERD (gastroesophageal reflux disease) (06/27/2012), Heart murmur, High cholesterol, Hyperlipidemia, Hypertension, Impaired fasting glucose (11/02/2017), Melanoma ([facility]), Mild  aortic stenosis, Other and unspecified hyperlipidemia (06/27/2012), Prediabetes, Protein S deficiency (HCC) (11/02/2017), Rheumatoid arthritis involving multiple sites with positive rheumatoid factor (HCC) (03/14/2016), Rheumatoid nodule (HCC) (07/25/2017), Right bundle branch block (06/27/2012), and Urinary incontinence (11/02/2017).    SURGICAL HISTORY   has a past surgical history that includes hysterectomy, total abdominal (1970's); arthroplasty (Left); cholecystectomy; other orthopedic surgery (2011); inguinal hernia repair (Right, 09/23/2016); other (08/12/2014); rotator cuff repair (Bilateral); hemicolectomy (Right, 12/13/2018); irrigation & debridement ortho (Bilateral, 07/31/2020); lumbar medial branch blocks (Bilateral, 12/15/2020); wide excision, lesion, head and neck region (Left, 03/29/2022); block epidural steroid injection (Right, 05/10/2022); knee arthroplasty total (Left); block epidural steroid injection (Right, 05/31/2022); remove armpits lymph nodes complt (Left, 06/07/2022); injection,sacroiliac joint (Right, 07/12/2022); lumbar fusion o-arm (09/21/2022); lumbar decompression (09/21/2022); lumbar laminectomy diskectomy (09/21/2022); foraminotomy (09/21/2022); other (Left); no pertinent past surgical history (2022); transcatheter aortic valve replacement (Bilateral, 9/25/2023); echocardiogram, transesophageal, intraoperative (N/A, 9/25/2023); and pacemaker insertion (Right, 9/25/2023).    FAMILY HISTORY  Family History   Problem Relation Age of Onset    Cancer Mother         stomach- ruptured appendix    Cancer Father         colon    Leukemia Father         many exposure, worked for [employer]     Heart [employer] Sister     Heart Disease Brother         s/p stent     Other Son         on blood thinner, unclear etiology    Clotting Disorder Neg Hx        SOCIAL HISTORY  Social History     Tobacco Use    Smoking status: Never    Smokeless tobacco: Never   Vaping Use    Vaping Use: Never used  "  Substance and Sexual Activity    Alcohol use: Not Currently     Comment: rarely    Drug use: Never    Sexual activity: Not Currently     Partners: Male     Comment:         CURRENT MEDICATIONS  Home Medications       Reviewed by Rhona Rogers R.N. (Registered Nurse) on 03/25/24 at 1437  Med List Status: Unable to Obtain     Medication Last Dose Status   acetaminophen (TYLENOL) 500 MG Tab  Active   amLODIPine (NORVASC) 2.5 MG Tab  Active   atorvastatin (LIPITOR) 20 MG Tab  Active   Calcium Carbonate 600 MG Tab  Active   diphenhydrAMINE-APAP, sleep,  MG Tab  Active   gabapentin (NEURONTIN) 300 MG Cap  Active   lidocaine (LIDODERM) 5 % Patch  Active   loperamide (IMODIUM) 2 MG Cap  Active   losartan (COZAAR) 50 MG Tab  Active   MAGNESIUM PO  Active   Melatonin 10 MG Tab  Active   Multiple Vitamins-Minerals (PRESERVISION AREDS 2 PO)  Active   omeprazole (PRILOSEC) 20 MG delayed-release capsule  Active   Polyethyl Glycol-Propyl Glycol (SYSTANE OP)  Active   polyethylene glycol/lytes (MIRALAX) 17 g Pack  Active   rivaroxaban (XARELTO) 20 MG Tab tablet  Active   SODIUM FLUORIDE 5000 PLUS 1.1 % Cream  Active   vitamin D (CHOLECALCIFEROL) 1000 UNIT Tab  Active                    ALLERGIES  Allergies   Allergen Reactions    Wound Dressing Adhesive      Pt says band-aids cause skin reaction/rash if on for more than 2 days  Paper tape OK         PHYSICAL EXAM  VITAL SIGNS: /63   Pulse 85   Temp 36.4 °C (97.6 °F) (Temporal)   Resp 20   Ht 1.575 m (5' 2\")   Wt 69.4 kg (153 lb)   LMP  (LMP Unknown)   SpO2 96%   BMI 27.98 kg/m²    Constitutional: Awake, alert, no acute distress.  Elderly.  HENT: Normocephalic, no obvious evidence of acute trauma.  Eyes: No scleral icterus. Normal conjunctiva   Thorax & Lungs: Normal nonlabored respirations. I appreciate no wheezing, rhonchi or rales. There is normal air movement.  Upon cardiac ascultation I appreciate a regular heart rhythm and a normal rate. "   Abdomen: The abdomen is not visibly distended. Upon palpation, I find it to be without tenderness.  No mass appreciated.  Skin: The exposed portions of skin reveal no obvious rash or other abnormalities.  Extremities/Musculoskeletal: No obvious sign of acute trauma. No asymmetric calf tenderness or edema.   Neurologic: Awake, alert, oriented to person and occasionally place.  Disoriented to time.  Normal and symmetric upper and lower extremity motor and sensory function bilaterally.    DIAGNOSTIC STUDIES / PROCEDURES    LABS  Results for orders placed or performed during the hospital encounter of 03/25/24   Lactic Acid   Result Value Ref Range    Lactic Acid 1.9 0.5 - 2.0 mmol/L   Lactic Acid   Result Value Ref Range    Lactic Acid 1.3 0.5 - 2.0 mmol/L   CBC with Differential   Result Value Ref Range    WBC 10.7 4.8 - 10.8 K/uL    RBC 4.84 4.20 - 5.40 M/uL    Hemoglobin 14.3 12.0 - 16.0 g/dL    Hematocrit 42.4 37.0 - 47.0 %    MCV 87.6 81.4 - 97.8 fL    MCH 29.5 27.0 - 33.0 pg    MCHC 33.7 32.2 - 35.5 g/dL    RDW 47.2 35.9 - 50.0 fL    Platelet Count 168 164 - 446 K/uL    MPV 10.6 9.0 - 12.9 fL    Neutrophils-Polys 84.70 (H) 44.00 - 72.00 %    Lymphocytes 6.90 (L) 22.00 - 41.00 %    Monocytes 7.50 0.00 - 13.40 %    Eosinophils 0.10 0.00 - 6.90 %    Basophils 0.40 0.00 - 1.80 %    Immature Granulocytes 0.40 0.00 - 0.90 %    Nucleated RBC 0.00 0.00 - 0.20 /100 WBC    Neutrophils (Absolute) 9.08 (H) 1.82 - 7.42 K/uL    Lymphs (Absolute) 0.74 (L) 1.00 - 4.80 K/uL    Monos (Absolute) 0.80 0.00 - 0.85 K/uL    Eos (Absolute) 0.01 0.00 - 0.51 K/uL    Baso (Absolute) 0.04 0.00 - 0.12 K/uL    Immature Granulocytes (abs) 0.04 0.00 - 0.11 K/uL    NRBC (Absolute) 0.00 K/uL   Complete Metabolic Panel   Result Value Ref Range    Sodium 133 (L) 135 - 145 mmol/L    Potassium 4.3 3.6 - 5.5 mmol/L    Chloride 102 96 - 112 mmol/L    Co2 18 (L) 20 - 33 mmol/L    Anion Gap 13.0 7.0 - 16.0    Glucose 164 (H) 65 - 99 mg/dL    Bun 21 8 -  22 mg/dL    Creatinine 0.62 0.50 - 1.40 mg/dL    Calcium 8.2 (L) 8.5 - 10.5 mg/dL    Correct Calcium 8.4 (L) 8.5 - 10.5 mg/dL    AST(SGOT) 36 12 - 45 U/L    ALT(SGPT) 36 2 - 50 U/L    Alkaline Phosphatase 68 30 - 99 U/L    Total Bilirubin 0.6 0.1 - 1.5 mg/dL    Albumin 3.8 3.2 - 4.9 g/dL    Total Protein 7.4 6.0 - 8.2 g/dL    Globulin 3.6 (H) 1.9 - 3.5 g/dL    A-G Ratio 1.1 g/dL   Urinalysis    Specimen: Urine   Result Value Ref Range    Color Yellow     Character Clear     Specific Gravity 1.018 <1.035    Ph 5.5 5.0 - 8.0    Glucose Negative Negative mg/dL    Ketones Negative Negative mg/dL    Protein 100 (A) Negative mg/dL    Bilirubin Negative Negative    Urobilinogen, Urine 0.2 Negative    Nitrite Positive (A) Negative    Leukocyte Esterase Trace (A) Negative    Occult Blood Small (A) Negative    Micro Urine Req Microscopic    ESTIMATED GFR   Result Value Ref Range    GFR (CKD-EPI) 86 >60 mL/min/1.73 m 2   CREATINE KINASE   Result Value Ref Range    CPK Total 473 (H) 0 - 154 U/L   URINE MICROSCOPIC (W/UA)   Result Value Ref Range    WBC 10-20 (A) /hpf    RBC 0-2 /hpf    Bacteria Many (A) None /hpf    Epithelial Cells Rare /hpf    Hyaline Cast 0-2 /lpf   EKG   Result Value Ref Range    Report       [facility] Emergency Dept.    Test Date:  2024  Pt Name:    SHAILESH BYRD               Department: ER  MRN:        [id number]                      Room:        20  Gender:     Female                       Technician: [id number]  :        [id number]                   Requested By:MARVIN SMITH  Order #:    [id number]                    Reading MD: MEGAN COBB MD    Measurements  Intervals                                Axis  Rate:       83                           P:          70  TX:         228                          QRS:        -54  QRSD:       149                          T:          78  QT:         420  QTc:        494    Interpretive Statements  Sinus rhythm rate of 83,  bifascicular block, no significant T wave or ST  segment changes.  No change compared to 10/4/2023.  My impression of this  EKG: No acute ischemia  Electronically Signed On 03- 16:37:45 PDT by MEGAN COBB MD          RADIOLOGY  I have independently interpreted the diagnostic imaging associated with this visit and am waiting the final reading from the radiologist.   My preliminary interpretation is as follows: No intracranial hemorrhage  Radiologist interpretation:   CT-HEAD W/O   Final Result      1.  Small, rounded hypodense areas of the bilateral cerebellar hemispheres. These may represent age-indeterminate infarcts. Less likely this could represent edema related to metastatic disease.   2.  There is a small area of loss of gray-white differentiation in the left parietal lobe, possibly an age-indeterminate infarct.   3.  These findings can be further evaluated with contrast-enhanced MRI as indicated.   4.  Cerebral atrophy.   5.  White matter lucencies most consistent with      DX-CHEST-PORTABLE (1 VIEW)   Final Result      No acute findings.            COURSE & MEDICAL DECISION MAKING    ED Observation Status? No; Patient does not meet criteria for ED Observation.     INITIAL ASSESSMENT, COURSE AND PLAN  Care Narrative: This is an anticoagulated 88-year-old female who lives alone, recently discharged in the hospital, comes in after being found on the floor by neighbors.  Too weak to get up.  She has no new focal complaints, no injuries, she is however confused.  Head CT will be obtained.  Workup will be initiated.  The patient will receive some IV fluids cautiously.  She will be reevaluated    Urinalysis is nitrate positive with microscopy indicative of infection.  Otherwise the hyponatremia is improved compared to prior, minimal elevation in CPK.  At this point, this point, I do think her acute confusion is secondary to the UTI, the weakness as well.  She is been treated with ceftriaxone.  Admitted  to the hospital in guarded condition    DISPOSITION AND DISCUSSIONS  I have discussed management of the patient with the following physicians and HAYLEE's: Dr. Waters, hospitalist    FINAL DIAGNOSIS  1. Acute UTI    2. Acute confusion due to infection    3. Generalized weakness           Electronically signed by: Maco Esipnal M.D., 3/25/2024 4:22 PM

## 2024-03-26 ENCOUNTER — APPOINTMENT (OUTPATIENT)
Dept: RADIOLOGY | Facility: MEDICAL CENTER | Age: 89
DRG: 288 | End: 2024-03-26
Attending: INTERNAL MEDICINE
Payer: MEDICARE

## 2024-03-26 ENCOUNTER — APPOINTMENT (OUTPATIENT)
Dept: RADIOLOGY | Facility: MEDICAL CENTER | Age: 89
DRG: 288 | End: 2024-03-26
Attending: STUDENT IN AN ORGANIZED HEALTH CARE EDUCATION/TRAINING PROGRAM
Payer: MEDICARE

## 2024-03-26 PROBLEM — R78.81 POSITIVE BLOOD CULTURE: Status: ACTIVE | Noted: 2024-03-26

## 2024-03-26 LAB
ALBUMIN SERPL BCP-MCNC: 3 G/DL (ref 3.2–4.9)
ALBUMIN/GLOB SERPL: 1.1 G/DL
ALP SERPL-CCNC: 54 U/L (ref 30–99)
ALT SERPL-CCNC: 29 U/L (ref 2–50)
ANION GAP SERPL CALC-SCNC: 10 MMOL/L (ref 7–16)
AST SERPL-CCNC: 30 U/L (ref 12–45)
BASOPHILS # BLD AUTO: 0.5 % (ref 0–1.8)
BASOPHILS # BLD: 0.05 K/UL (ref 0–0.12)
BILIRUB SERPL-MCNC: 0.6 MG/DL (ref 0.1–1.5)
BUN SERPL-MCNC: 15 MG/DL (ref 8–22)
CALCIUM ALBUM COR SERPL-MCNC: 8.1 MG/DL (ref 8.5–10.5)
CALCIUM SERPL-MCNC: 7.3 MG/DL (ref 8.5–10.5)
CHLORIDE SERPL-SCNC: 108 MMOL/L (ref 96–112)
CK SERPL-CCNC: 427 U/L (ref 0–154)
CO2 SERPL-SCNC: 17 MMOL/L (ref 20–33)
CREAT SERPL-MCNC: 0.51 MG/DL (ref 0.5–1.4)
EOSINOPHIL # BLD AUTO: 0.19 K/UL (ref 0–0.51)
EOSINOPHIL NFR BLD: 1.9 % (ref 0–6.9)
ERYTHROCYTE [DISTWIDTH] IN BLOOD BY AUTOMATED COUNT: 47.8 FL (ref 35.9–50)
GFR SERPLBLD CREATININE-BSD FMLA CKD-EPI: 90 ML/MIN/1.73 M 2
GLOBULIN SER CALC-MCNC: 2.8 G/DL (ref 1.9–3.5)
GLUCOSE BLD STRIP.AUTO-MCNC: 130 MG/DL (ref 65–99)
GLUCOSE SERPL-MCNC: 115 MG/DL (ref 65–99)
HCT VFR BLD AUTO: 35.4 % (ref 37–47)
HGB BLD-MCNC: 11.7 G/DL (ref 12–16)
IMM GRANULOCYTES # BLD AUTO: 0.03 K/UL (ref 0–0.11)
IMM GRANULOCYTES NFR BLD AUTO: 0.3 % (ref 0–0.9)
LYMPHOCYTES # BLD AUTO: 1.14 K/UL (ref 1–4.8)
LYMPHOCYTES NFR BLD: 11.4 % (ref 22–41)
MCH RBC QN AUTO: 29.3 PG (ref 27–33)
MCHC RBC AUTO-ENTMCNC: 33.1 G/DL (ref 32.2–35.5)
MCV RBC AUTO: 88.7 FL (ref 81.4–97.8)
MONOCYTES # BLD AUTO: 1 K/UL (ref 0–0.85)
MONOCYTES NFR BLD AUTO: 10 % (ref 0–13.4)
NEUTROPHILS # BLD AUTO: 7.6 K/UL (ref 1.82–7.42)
NEUTROPHILS NFR BLD: 75.9 % (ref 44–72)
NRBC # BLD AUTO: 0 K/UL
NRBC BLD-RTO: 0 /100 WBC (ref 0–0.2)
PLATELET # BLD AUTO: 172 K/UL (ref 164–446)
PMV BLD AUTO: 10.1 FL (ref 9–12.9)
POTASSIUM SERPL-SCNC: 3.9 MMOL/L (ref 3.6–5.5)
PROCALCITONIN SERPL-MCNC: 0.54 NG/ML
PROT SERPL-MCNC: 5.8 G/DL (ref 6–8.2)
RBC # BLD AUTO: 3.99 M/UL (ref 4.2–5.4)
SODIUM SERPL-SCNC: 135 MMOL/L (ref 135–145)
WBC # BLD AUTO: 10 K/UL (ref 4.8–10.8)

## 2024-03-26 PROCEDURE — 82962 GLUCOSE BLOOD TEST: CPT

## 2024-03-26 PROCEDURE — 700117 HCHG RX CONTRAST REV CODE 255: Performed by: INTERNAL MEDICINE

## 2024-03-26 PROCEDURE — 700105 HCHG RX REV CODE 258: Performed by: STUDENT IN AN ORGANIZED HEALTH CARE EDUCATION/TRAINING PROGRAM

## 2024-03-26 PROCEDURE — 700111 HCHG RX REV CODE 636 W/ 250 OVERRIDE (IP): Performed by: STUDENT IN AN ORGANIZED HEALTH CARE EDUCATION/TRAINING PROGRAM

## 2024-03-26 PROCEDURE — 700102 HCHG RX REV CODE 250 W/ 637 OVERRIDE(OP): Performed by: INTERNAL MEDICINE

## 2024-03-26 PROCEDURE — 84145 PROCALCITONIN (PCT): CPT

## 2024-03-26 PROCEDURE — 80053 COMPREHEN METABOLIC PANEL: CPT

## 2024-03-26 PROCEDURE — A9270 NON-COVERED ITEM OR SERVICE: HCPCS | Performed by: INTERNAL MEDICINE

## 2024-03-26 PROCEDURE — 70498 CT ANGIOGRAPHY NECK: CPT

## 2024-03-26 PROCEDURE — 99233 SBSQ HOSP IP/OBS HIGH 50: CPT | Performed by: INTERNAL MEDICINE

## 2024-03-26 PROCEDURE — 36415 COLL VENOUS BLD VENIPUNCTURE: CPT

## 2024-03-26 PROCEDURE — 700102 HCHG RX REV CODE 250 W/ 637 OVERRIDE(OP): Performed by: STUDENT IN AN ORGANIZED HEALTH CARE EDUCATION/TRAINING PROGRAM

## 2024-03-26 PROCEDURE — 99222 1ST HOSP IP/OBS MODERATE 55: CPT | Performed by: STUDENT IN AN ORGANIZED HEALTH CARE EDUCATION/TRAINING PROGRAM

## 2024-03-26 PROCEDURE — 97166 OT EVAL MOD COMPLEX 45 MIN: CPT

## 2024-03-26 PROCEDURE — 700101 HCHG RX REV CODE 250: Performed by: STUDENT IN AN ORGANIZED HEALTH CARE EDUCATION/TRAINING PROGRAM

## 2024-03-26 PROCEDURE — 97535 SELF CARE MNGMENT TRAINING: CPT

## 2024-03-26 PROCEDURE — 85025 COMPLETE CBC W/AUTO DIFF WBC: CPT

## 2024-03-26 PROCEDURE — A9270 NON-COVERED ITEM OR SERVICE: HCPCS | Performed by: STUDENT IN AN ORGANIZED HEALTH CARE EDUCATION/TRAINING PROGRAM

## 2024-03-26 PROCEDURE — 82550 ASSAY OF CK (CPK): CPT

## 2024-03-26 PROCEDURE — 770006 HCHG ROOM/CARE - MED/SURG/GYN SEMI*

## 2024-03-26 PROCEDURE — 70496 CT ANGIOGRAPHY HEAD: CPT

## 2024-03-26 PROCEDURE — 70450 CT HEAD/BRAIN W/O DYE: CPT

## 2024-03-26 RX ORDER — CALCIUM CARBONATE 500 MG/1
500 TABLET, CHEWABLE ORAL 2 TIMES DAILY
Status: DISCONTINUED | OUTPATIENT
Start: 2024-03-26 | End: 2024-03-28

## 2024-03-26 RX ORDER — HALOPERIDOL 5 MG/ML
1 INJECTION INTRAMUSCULAR
Status: DISCONTINUED | OUTPATIENT
Start: 2024-03-26 | End: 2024-03-29

## 2024-03-26 RX ADMIN — LOSARTAN POTASSIUM 50 MG: 50 TABLET, FILM COATED ORAL at 05:43

## 2024-03-26 RX ADMIN — IOHEXOL 80 ML: 350 INJECTION, SOLUTION INTRAVENOUS at 17:15

## 2024-03-26 RX ADMIN — GABAPENTIN 300 MG: 300 CAPSULE ORAL at 05:43

## 2024-03-26 RX ADMIN — ANTACID TABLETS 500 MG: 500 TABLET, CHEWABLE ORAL at 08:34

## 2024-03-26 RX ADMIN — CEFTRIAXONE SODIUM 1000 MG: 10 INJECTION, POWDER, FOR SOLUTION INTRAVENOUS at 14:27

## 2024-03-26 RX ADMIN — SODIUM CHLORIDE: 9 INJECTION, SOLUTION INTRAVENOUS at 02:00

## 2024-03-26 ASSESSMENT — ENCOUNTER SYMPTOMS
HEADACHES: 0
FEVER: 0
SHORTNESS OF BREATH: 0
ABDOMINAL PAIN: 0
NAUSEA: 0
FALLS: 1
MYALGIAS: 0
CHILLS: 0
DEPRESSION: 0
VOMITING: 0

## 2024-03-26 ASSESSMENT — COGNITIVE AND FUNCTIONAL STATUS - GENERAL
TOILETING: TOTAL
HELP NEEDED FOR BATHING: A LOT
DAILY ACTIVITIY SCORE: 13
SUGGESTED CMS G CODE MODIFIER DAILY ACTIVITY: CL
PERSONAL GROOMING: A LITTLE
DRESSING REGULAR UPPER BODY CLOTHING: A LOT
DRESSING REGULAR LOWER BODY CLOTHING: A LOT
EATING MEALS: A LITTLE

## 2024-03-26 ASSESSMENT — GAIT ASSESSMENTS: DISTANCE (FEET): 5

## 2024-03-26 ASSESSMENT — ACTIVITIES OF DAILY LIVING (ADL): TOILETING: INDEPENDENT

## 2024-03-26 ASSESSMENT — PAIN DESCRIPTION - PAIN TYPE: TYPE: ACUTE PAIN

## 2024-03-26 NOTE — ED NOTES
Medication history reviewed with patient at bedside.   Med rec is complete  Allergies reviewed.   Patient has not had any outpatient antibiotics in the last 30 days.   Anticoagulants: Xarelto 20mg- last dose 3/24/24 pm  Nabil Rothman

## 2024-03-26 NOTE — PROGRESS NOTES
4 Eyes Skin Assessment Completed by MARCO Milan and MARCO Morales.    Head WDL  Ears WDL  Nose WDL  Mouth WDL  Neck WDL  Breast/Chest WDL  Shoulder Blades WDL  Spine WDL  (R) Arm/Elbow/Hand WDL  (L) Arm/Elbow/Hand WDL  Abdomen Scar  Groin WDL  Scrotum/Coccyx/Buttocks Redness and Blanching  (R) Leg WDL  (L) Leg Scar  (R) Heel/Foot/Toe WDL  (L) Heel/Foot/Toe WDL          Devices In Places Blood Pressure Cuff, PIV      Interventions In Place Pillows    Possible Skin Injury No    Pictures Uploaded Into Epic Yes  Wound Consult Placed N/A  RN Wound Prevention Protocol Ordered No

## 2024-03-26 NOTE — ASSESSMENT & PLAN NOTE
16 minutes spent discussing goals of care with patient's son (Nando [phone]) via telephone.  He was explained about patient's current clinical condition and treatment plan.  He states that patient was confused when he was on the phone with her this morning, and that she did not recognize who he was.  He spoke with her in the evening yesterday and patient appeared to be at baseline.  He was asked about CODE STATUS, as he is power of , he states that he would like patient to be full code and to have everything done, including chest compressions and intubation.  He has a flight here tomorrow afternoon

## 2024-03-26 NOTE — PROGRESS NOTES
[facility] Daily Progress Note    Date of Service  3/26/2024    Chief Complaint  Marry Mathur is a 88 y.o. female admitted 3/25/2024 with altered mental status    Hospital Course  88 y.o. female who presented 3/25/2024 with altered mental status.  History limited as patient is confused at bedside.  Patient reports that she went to the restroom and caught her foot on an object, and had been lying down on the ground for a few minutes.  She was too weak to get up.  Per EMS, neighbors checked on patient and found her on the ground, unclear of how long she was actually down on the ground.  She was then brought to ER for evaluation.     In ER, patient found to have normal vital signs.  Pertinent labs include prerenal azotemia, .  UA showing many bacteria, elevated white blood cell count/nitrite.  CT head showing suspected age-indeterminate infarcts.  Chest x-ray showed no acute findings.    Interval Problem Update  Vital signs stable on room air  Calcium 8.1, start calcium carbonate twice daily  , renal function stable  Urine culture pending, recent urine culture 3/20 with pansensitive E. Coli  Blood culture 1/2 with possible strep  -repeat bcx ordered for tomorrow   Patient is alert and oriented to person, place and time but not situation.  Patient today saying that she did not fall at home that she was lying on the carpet because it is soft.  Has no complaints at bedside today, denies abdominal pain, dysuria.    Addendum: Notified by nursing that patient had acute change in neuro exam, very lethargic disoriented. Rapid called, evaluated patient at bedside. Patient with leftward lean, very lethargic wakes to sternal rub. Answering some intermittent questions. Inially not moving left arm or leg or hold against gravity. Code Stroke called, discussed with neurology. Patient with some improvement held her left arm against gravity. Plan for stat CTA head and neck. I have ordered EEG and patient  noted to have BM in the chair. Discussed plan with patient's son and son-in-law at bedside     I have discussed this patient's plan of care and discharge plan at IDT rounds today with Case Management, Nursing, Nursing leadership, and other members of the IDT team.    Consultants/Specialty  none    Code Status  Full Code    Disposition  The patient is not medically cleared for discharge to home or a post-acute facility.  Anticipate discharge to: home with organized home healthcare and close outpatient follow-up    I have placed the appropriate orders for post-discharge needs.    Review of Systems  Review of Systems   Constitutional:  Negative for chills and fever.   Respiratory:  Negative for shortness of breath.    Cardiovascular:  Negative for chest pain.   Gastrointestinal:  Negative for abdominal pain, nausea and vomiting.   Genitourinary:  Negative for dysuria.   Musculoskeletal:  Positive for falls. Negative for myalgias.   Skin:  Negative for rash.   Neurological:  Negative for headaches.   Psychiatric/Behavioral:  Negative for depression.    All other systems reviewed and are negative.       Physical Exam  Temp:  [36.1 °C (97 °F)-37.2 °C (98.9 °F)] 37.1 °C (98.8 °F)  Pulse:  [78-95] 86  Resp:  [16-18] 18  BP: (106-184)/() 106/64  SpO2:  [93 %-96 %] 94 %    Physical Exam  Vitals and nursing note reviewed.   Constitutional:       General: She is not in acute distress.     Appearance: She is ill-appearing (chronically).   HENT:      Head: Normocephalic and atraumatic.      Mouth/Throat:      Pharynx: Oropharynx is clear.   Eyes:      Conjunctiva/sclera: Conjunctivae normal.   Cardiovascular:      Rate and Rhythm: Normal rate and regular rhythm.      Pulses: Normal pulses.   Pulmonary:      Effort: Pulmonary effort is normal. No respiratory distress.      Breath sounds: Normal breath sounds. No wheezing or rales.   Abdominal:      General: Abdomen is flat. There is no distension.      Palpations: Abdomen is  soft.      Tenderness: There is no abdominal tenderness.   Musculoskeletal:         General: Normal range of motion.      Cervical back: Normal range of motion and neck supple.      Right lower leg: No edema.      Left lower leg: No edema.   Skin:     General: Skin is warm and dry.   Neurological:      General: No focal deficit present.      Mental Status: She is alert.      Cranial Nerves: No cranial nerve deficit.      Comments: A&Ox3, not situation    Psychiatric:         Mood and Affect: Mood is anxious.         Fluids    Intake/Output Summary (Last 24 hours) at 3/26/2024 1650  Last data filed at 3/26/2024 0658  Gross per 24 hour   Intake 240 ml   Output --   Net 240 ml       Laboratory  Recent Labs     03/25/24  1425 03/26/24  0415   WBC 10.7 10.0   RBC 4.84 3.99*   HEMOGLOBIN 14.3 11.7*   HEMATOCRIT 42.4 35.4*   MCV 87.6 88.7   MCH 29.5 29.3   MCHC 33.7 33.1   RDW 47.2 47.8   PLATELETCT 168 172   MPV 10.6 10.1     Recent Labs     03/25/24  1425 03/26/24  0415   SODIUM 133* 135   POTASSIUM 4.3 3.9   CHLORIDE 102 108   CO2 18* 17*   GLUCOSE 164* 115*   BUN 21 15   CREATININE 0.62 0.51   CALCIUM 8.2* 7.3*                   Imaging  CT-HEAD W/O   Final Result      1.  Small, rounded hypodense areas of the bilateral cerebellar hemispheres. These may represent age-indeterminate infarcts. Less likely this could represent edema related to metastatic disease.   2.  There is a small area of loss of gray-white differentiation in the left parietal lobe, possibly an age-indeterminate infarct.   3.  These findings can be further evaluated with contrast-enhanced MRI as indicated.   4.  Cerebral atrophy.   5.  White matter lucencies most consistent with      DX-CHEST-PORTABLE (1 VIEW)   Final Result      No acute findings.      CT-CTA HEAD WITH & W/O-POST PROCESS    (Results Pending)   CT-CTA NECK WITH & W/O-POST PROCESSING    (Results Pending)   CT-HEAD W/O    (Results Pending)        Assessment/Plan  * Acute metabolic  encephalopathy- (present on admission)  Assessment & Plan  Patient presents with confusion.  UA pertinent for UTI, likely culprit for encephalopathy.  CT head  showing no acute changes  Ceftriaxone  Fluids  Consider MRI brain if patient does not improve    Positive blood culture  Assessment & Plan  1/2 bc from admission- possible strep   -repeat bcx in am     S/P TAVR (transcatheter aortic valve replacement)- (present on admission)  Assessment & Plan  Performed in September 2023    Protein S deficiency (HCC)- (present on admission)  Assessment & Plan  Continue Xarelto    History of recurrent deep vein thrombosis (DVT)- (present on admission)  Assessment & Plan  On Xarelto    ACP (advance care planning)  Assessment & Plan  16 minutes spent discussing goals of care with patient's son (Nando [phone]) via telephone.  He was explained about patient's current clinical condition and treatment plan.  He states that patient was confused when he was on the phone with her this morning, and that she did not recognize who he was.  He spoke with her in the evening yesterday and patient appeared to be at baseline.  He was asked about CODE STATUS, as he is power of , he states that he would like patient to be full code and to have everything done, including chest compressions and intubation.  He has a flight here tomorrow afternoon    Other hyperlipidemia- (present on admission)  Assessment & Plan  Lipitor    Hypertension  Assessment & Plan  Restart         VTE prophylaxis:    therapeutic anticoagulation with xarelto 20 mg daily witih meals    I have performed a physical exam and reviewed and updated ROS and Plan today (3/26/2024). In review of yesterday's note (3/25/2024), there are no changes except as documented above.

## 2024-03-26 NOTE — PROGRESS NOTES
"  [facility] PROGRESS NOTE   Medical Student Note    Attending: Patricia Youngblood M.D.  Medical Student: Julieth Kim, MS3  PATIENT: Marry Mathur; [id number]; 1935    Hospital Day # Hospital Day: 2    ID: 88 y.o. female with past medical history of HTN, HLP, pre-diabetes, RA, protein S deficiency, melanoma, adenocarcinoma, and mild aortic stenosis was admitted on 3/25/2024 for acute metabolic encephalopathy after she was found confused and on floor in her house.     24 Hour Events: No acute events overnight    SUBJECTIVE: Pt states she is doing well this morning. She denies any pain and states she is just hungry. Pt is oriented to person, place, and year. She states she is in the hospital for \"some TLC\".Her only complaint is that she is hungry and would like some food. Pt states that she did not fall at home but was trying to go to the bathroom and laid down. Pt denies fever, chills, chest pain, SOB, headache, and dizziness.    OBJECTIVE:  Temp:  [36.1 °C (97 °F)-36.8 °C (98.2 °F)] 36.8 °C (98.2 °F)  Pulse:  [74-95] 82  Resp:  [16-20] 18  BP: (115-184)/() 134/74  SpO2:  [93 %-96 %] 93 %  No intake or output data in the 24 hours ending 03/26/24 1000    PE:  Gen: No acute distress, resting comfortably in bed  HEENT: normocephalic, atraumatic, EOMI  Pulm: clear to auscultation bilaterally, no respiratory distress   Cardio: RRR, no M/R/G  Abdom: soft, nontender, nondistended, normoactive bowel sounds in all quadrants  Ext: No edema, 2+ pulses bilaterally  Neuro: Grossly intact. A&Ox3 - not oriented to situation    LABS:  Recent Labs     03/25/24  1425 03/26/24  0415   WBC 10.7 10.0   RBC 4.84 3.99*   HEMOGLOBIN 14.3 11.7*   HEMATOCRIT 42.4 35.4*   MCV 87.6 88.7   MCH 29.5 29.3   RDW 47.2 47.8   PLATELETCT 168 172   MPV 10.6 10.1   NEUTSPOLYS 84.70* 75.90*   LYMPHOCYTES 6.90* 11.40*   MONOCYTES 7.50 10.00   EOSINOPHILS 0.10 1.90   BASOPHILS 0.40 0.50     Recent Labs     03/25/24  1425 03/26/24  0415 " "  SODIUM 133* 135   POTASSIUM 4.3 3.9   CHLORIDE 102 108   CO2 18* 17*   BUN 21 15   CREATININE 0.62 0.51   CALCIUM 8.2* 7.3*   ALBUMIN 3.8 3.0*     Estimated GFR/CRCL = Estimated Creatinine Clearance: 70.9 mL/min (by C-G formula based on SCr of 0.51 mg/dL).  Recent Labs     03/25/24  1425 03/26/24  0415   GLUCOSE 164* 115*     Recent Labs     03/25/24  1425 03/26/24  0415   ASTSGOT 36 30   ALTSGPT 36 29   TBILIRUBIN 0.6 0.6   ALKPHOSPHAT 68 54   GLOBULIN 3.6* 2.8     Recent Labs     03/25/24  1425 03/26/24  0415   CPKTOTAL 473* 427*         No results for input(s): \"INR\", \"APTT\", \"FIBRINOGEN\" in the last 72 hours.    Invalid input(s): \"DIMER\"    MICROBIOLOGY:   Blood Culture   Date Value Ref Range Status   09/28/2017 No growth after 5 days of incubation.  Final        IMAGING:   CT-HEAD W/O   Final Result      1.  Small, rounded hypodense areas of the bilateral cerebellar hemispheres. These may represent age-indeterminate infarcts. Less likely this could represent edema related to metastatic disease.   2.  There is a small area of loss of gray-white differentiation in the left parietal lobe, possibly an age-indeterminate infarct.   3.  These findings can be further evaluated with contrast-enhanced MRI as indicated.   4.  Cerebral atrophy.   5.  White matter lucencies most consistent with      DX-CHEST-PORTABLE (1 VIEW)   Final Result      No acute findings.          MEDS:  Current Facility-Administered Medications   Medication Last Admin    calcium carbonate (Tums) chewable tab 500 mg 500 mg at 03/26/24 0834    amLODIPine (Norvasc) tablet 2.5 mg 2.5 mg at 03/25/24 2016    atorvastatin (Lipitor) tablet 20 mg 20 mg at 03/25/24 2016    gabapentin (Neurontin) capsule 300 mg 300 mg at 03/26/24 0543    losartan (Cozaar) tablet 50 mg 50 mg at 03/26/24 0543    rivaroxaban (Xarelto) tablet 20 mg      cefTRIAXone (Rocephin) syringe 1,000 mg      NS infusion New Bag at 03/26/24 0200    acetaminophen (Tylenol) tablet 650 mg   "       PROBLEM LIST:  No problems updated.    ASSESSMENT/PLAN: 88 y.o. female with past medical history of HTN, HLP, pre-diabetes, RA, protein S deficiency, melanoma, adenocarcinoma, and mild aortic stenosis was admitted for acute metabolic encephalopathy. Pt was found to have UA + for nitrates, leuk esterase, and many bacteria, CPK improved from 473 to 427 over the last day. Presentation most consistent with acute metabolic encephalopathy, likely secondary to UTI.     #Acute metabolic encephalopathy  - Pt presents with confusion.   - UA indicates UTI, pt on Ceftriaxone and fluids  - CT Head showing no acute problems, age indeterminate infarct  - Blood culture ordered, repeat ordered for tomorrow  - Procal ordered  - Request for PT/OT    #UTI  - UA positive for nitrates, leuk esterase, and many bacteria  - Ceftriaxone and fluids started  - Urine culture resulted positive for strep    #Protein S deficiency  - Continue xarelto 20mg qD    #Hypocalcemia  - Chronic  - Calcium: 7.3  - Continue Ca supplements    #Hyperlipidemia  - Chronic  - Continue atorvastatin 20mg qD    #HTN  - Chronic  - Blood pressure 130s/70s  - Continue amlodipine 2.5mg qD, losartan 50mg qD    #Advance Care Planning  - Pt's son (power of ), Nando, was called yesterday and has flight here this afternoon. He reports patient sounded confused on the phone yesterday morning and was at her mental baseline on 3/24 in the evening.  - Pt was seen by case management who placed referral for SNF      Core Measures:  Fluids: NS at 83 mL/h  Lines: PIV  Abx: Ceftriaxone  Diet: Regular  PPX: SCDs/TEDs, xarelto      #DISPOSITION  - Inpatient for IV antibiotics and further monitoring      Julieth Kim, MS3  [facility]

## 2024-03-26 NOTE — CARE PLAN
The patient is Stable - Low risk of patient condition declining or worsening    Shift Goals  Clinical Goals: IVF; pt safety during shift  Patient Goals: rest  Family Goals: wilmer    Progress made toward(s) clinical / shift goals: 2 RN skin check and admit profile complete, talked to OSWALDO son Nando and addressed questions/concerns. IVF continued. Pt safety maintained at this time, call light and personal belongings within reach.    Problem: Knowledge Deficit - Standard  Goal: Patient and family/care givers will demonstrate understanding of plan of care, disease process/condition, diagnostic tests and medications  Outcome: Progressing     Problem: Fall Risk  Goal: Patient will remain free from falls  Outcome: Progressing     Problem: Pain - Standard  Goal: Alleviation of pain or a reduction in pain to the patient’s comfort goal  Outcome: Progressing     Patient is not progressing towards the following goals:

## 2024-03-26 NOTE — CARE PLAN
Pt AO x3, disoriented to time.  Pt denies pain.  Call light and belongings within reach.  Bed locked and in lowest position.  Hourly rounding.  Needs currently met.           The patient is Stable - Low risk of patient condition declining or worsening    Shift Goals  Clinical Goals: safety  Patient Goals: rest  Family Goals: TIM    Progress made toward(s) clinical / shift goals:    Rapid response called for pt during shift, see note.  Pt was able to remain free of falls for remainder of shift.     Problem: Knowledge Deficit - Standard  Goal: Patient and family/care givers will demonstrate understanding of plan of care, disease process/condition, diagnostic tests and medications  Outcome: Progressing     Problem: Fall Risk  Goal: Patient will remain free from falls  Outcome: Progressing

## 2024-03-26 NOTE — THERAPY
"Occupational Therapy   Initial Evaluation     Patient Name: Marry Mathur  Age:  88 y.o., Sex:  female  Medical Record #: [id number]  Today's Date: 3/26/2024     Precautions  Precautions: (P) Fall Risk    Assessment  Pt  is a 88 y.o. female admitted 3/25/2024 with altered mental status, found down at home by neighbors, pt reported she sat on carpet to rest, did not fall. Pt recently admitted and discharged home 3/21.     Pt seen for OT eval. Pt very confused and disoriented, required cues throughout and reassurance due to paranoia about her purse not being in rm and her cards being used. Pt participated in functional transfer to chair, requiring modA overall, pt moving slowly with impaired strength and balance. Pt incontinent additionally of B&B requiring maxA. Pt is below baseline level of functioning and will continue to benefit from inpt OT. Recommend post acute placement upon dc as pt lives alone and unable to care for self at this time.     Plan    Occupational Therapy Initial Treatment Plan   Treatment Interventions: (P) Self Care / Activities of Daily Living, Therapeutic Activity, Therapeutic Exercises  Treatment Frequency: (P) 3 Times per Week  Duration: (P) Until Therapy Goals Met    DC Equipment Recommendations: (P) Unable to determine at this time  Discharge Recommendations: (P) Recommend post-acute placement for additional occupational therapy services prior to discharge home     Subjective    \"I think he is stealing my credit card info\" (pt next to her)      Objective       03/26/24 1142    Services   Is patient using  services for this encounter? No   Initial Contact Note    Initial Contact Note Order Received and Verified, Occupational Therapy Evaluation in Progress with Full Report to Follow.   Prior Living Situation   Prior Services Home-Independent   Housing / Facility 1 Story House   Bathroom Set up Walk In Shower;Shower Chair   Equipment Owned Front-Wheel Walker;Tub / " Shower Seat   Lives with - Patient's Self Care Capacity Alone and Unable to Care For Self   Comments per EMR and pt, reports has help from a friend for IADLs, shopping/driving   Prior Level of ADL Function   Self Feeding Independent   Grooming / Hygiene Independent   Bathing Independent   Dressing Independent   Toileting Independent   Prior Level of IADL Function   Medication Management Independent   Laundry Independent   Kitchen Mobility Independent   Finances Requires Assist   Home Management Independent   Shopping Requires Assist   Prior Level Of Mobility Independent With Device in Community   Driving / Transportation Relatives / Others Provide Transportation   Comments friends/neighbors assist pt for IADLs. check in frequently   History of Falls   History of Falls Yes   Date of Last Fall   (reason for admit, found down at home)   Precautions   Precautions Fall Risk   Vitals   Pulse 78   Pulse Oximetry 96 %   O2 Delivery Device None - Room Air   Pain   Intervention Declines   Pain 0 - 10 Group   Location Leg   Location Orientation Left   Therapist Pain Assessment During Activity;Post Activity Pain Same as Prior to Activity;Nurse Notified;3   Cognition    Cognition / Consciousness X   Orientation Level Not Oriented to Time;Not Oriented to Place;Not Oriented to Day;Not Oriented to Month   Level of Consciousness Alert   Ability To Follow Commands 1 Step   Safety Awareness Impaired   New Learning Impaired   Attention Impaired   Comments pt disoriented, reporting she did not fall she sat down on the soft carpet before going to bathroom and could not get up. pt with paranoia about her credit card being used by other pt in . confused however able to follow 1 step commands, make needs known   Passive ROM Upper Body   Passive ROM Upper Body WDL   Active ROM Upper Body   Active ROM Upper Body  X   Dominant Hand Right   Lt Shoulder Flexion Degrees 90   Comments reports past issue with lymph nodes in LUE   Strength Upper  Body   Upper Body Strength  X   Comments BUE grossly WFL. generalized weakness LUE> RUE   Sensation Upper Body   Upper Extremity Sensation  WDL   Upper Body Muscle Tone   Upper Body Muscle Tone  WDL   Neurological Concerns   Neurological Concerns No   Coordination Upper Body   Coordination WDL   Balance Assessment   Sitting Balance (Static) Fair -   Sitting Balance (Dynamic) Poor +   Standing Balance (Static) Poor +   Standing Balance (Dynamic) Poor   Weight Shift Sitting Fair   Weight Shift Standing Fair   Comments w/FWW   Bed Mobility    Supine to Sit Minimal Assist   Scooting Minimal Assist   Rolling Minimum Assist to Lt.   Comments HOB slightly elevated   ADL Assessment   Eating Supervision   Grooming Minimal Assist;Seated   Bathing Maximal Assist   Upper Body Dressing Minimal Assist   Lower Body Dressing Maximal Assist   Toileting Maximal Assist  (incontinent of B&B)   Functional Mobility   Sit to Stand Minimal Assist   Bed, Chair, Wheelchair Transfer Moderate Assist   Transfer Method Stand Step   Mobility supine>EOB> chair   Distance (Feet) 5   Comments w/FWW   Visual Perception   Visual Perception  WDL   Edema / Skin Assessment   Edema / Skin  WDL   Activity Tolerance   Sitting in Chair post session, > 30 min   Sitting Edge of Bed 10 min   Standing 5 min   Patient / Family Goals   Patient / Family Goal #1 to get back home   Short Term Goals   Short Term Goal # 1 Pt will complete functional transfers supervised   Short Term Goal # 2 Pt will complete toileting Perlita   Short Term Goal # 3 Pt will complete UB/LB dressing supervised with A/E as needed   Education Group   Education Provided Role of Occupational Therapist;Activities of Daily Living   Role of Occupational Therapist Patient Response Patient;Acceptance;Demonstration;Reinforcement Needed   ADL Patient Response Patient;Acceptance;Demonstration;Reinforcement Needed   Occupational Therapy Initial Treatment Plan    Treatment Interventions Self Care /  Activities of Daily Living;Therapeutic Activity;Therapeutic Exercises   Treatment Frequency 3 Times per Week   Duration Until Therapy Goals Met   Problem List   Problem List Decreased Active Daily Living Skills;Decreased Activity Tolerance;Decreased Functional Mobility;Decreased Upper Extremity Strength Left;Decreased Upper Extremity Strength Right;Impaired Cognitive Function;Impaired Postural Control / Balance   Anticipated Discharge Equipment and Recommendations   DC Equipment Recommendations Unable to determine at this time   Discharge Recommendations Recommend post-acute placement for additional occupational therapy services prior to discharge home   Interdisciplinary Plan of Care Collaboration   IDT Collaboration with  Nursing   Patient Position at End of Therapy Seated;Chair Alarm On;Tray Table within Reach;Call Light within Reach   Collaboration Comments RN updated   Session Information   Date / Session Number  3/26, #1 (1/3, 4/1)

## 2024-03-26 NOTE — ASSESSMENT & PLAN NOTE
Blood cultures 3/25 with strep species  Repeat blood cultures 3/27 taken  Urine culture positive for E coli

## 2024-03-26 NOTE — DISCHARGE PLANNING
Care Transition Team Assessment    LMSW met with pt at bedside to complete assessment. Pt A&Ox3 and able to verify the information on the face sheet. Prior to this hospitalization pt was independent at home with ADLs and most IADLs. Pt does use a walker for DME at baseline. Pt reported that her son is good support for her. Pt is retired and receives SSI monthly deposits. Pt denies any SA or MH concerns. Pt does have an advance directive.     On prior admission, pt was set up with [facility] for Home Health services. Transitional Care Management also attempted to follow up with the pt twice with no answer in return from the pt.     Information Source  Orientation Level: Oriented to person, Oriented to place, Oriented to situation  Information Given By: Patient, Relative  Informant's Name: Nando  Who is responsible for making decisions for patient? : Patient  Name(s) of Primary Decision Maker: Nando    Readmission Evaluation  Is this a readmission?: Yes - unplanned readmission    Elopement Risk  Legal Hold: No  Ambulatory or Self Mobile in Wheelchair: No-Not an Elopement Risk  Elopement Risk: Not at Risk for Elopement    Interdisciplinary Discharge Planning  Lives with - Patient's Self Care Capacity: Alone and Unable to Care For Self  Patient or legal guardian wants to designate a caregiver: No  Housing / Facility: [address] [facility]  Prior Services: Home-Independent    Discharge Preparedness  What is your plan after discharge?: Skilled nursing facility  What are your discharge supports?: Child  Prior Functional Level: Needs Assist with Activities of Daily Living, Needs Assist with Medication Management  Difficulity with ADLs: Bathing, Dressing, Walking, Toileting  Difficulity with IADLs: Cooking, Driving, Laundry    Functional Assesment  Prior Functional Level: Needs Assist with Activities of Daily Living, Needs Assist with Medication Management    Finances  Financial Barriers to Discharge: No  Prescription  Coverage: Yes    Vision / Hearing Impairment  Vision Impairment : Yes  Right Eye Vision: Wears Glasses  Left Eye Vision: Wears Glasses  Hearing Impairment : No    Advance Directive  Advance Directive?: DPOA for Health Care    Domestic Abuse  Have you ever been the victim of abuse or violence?: No  Physical Abuse or Sexual Abuse: No  Verbal Abuse or Emotional Abuse: No  Possible Abuse/Neglect Reported to:: Not Applicable    Psychological Assessment  History of Substance Abuse: None  History of Psychiatric Problems: No  Non-compliant with Treatment: No  Newly Diagnosed Illness: No    Discharge Risks or Barriers  Discharge risks or barriers?: Lives alone, no community support  Patient risk factors: Cognitive / sensory / physical deficit, Lives alone and no community support, Vulnerable adult    Anticipated Discharge Information  Discharge Disposition: D/T to SNF with Medicare cert in anticipation of skilled care (03)

## 2024-03-26 NOTE — THERAPY
Physical Therapy Contact Note    Patient Name: Marry Mathur  Age:  88 y.o., Sex:  female  Medical Record #: [id number]  Today's Date: 3/26/2024         03/26/24 1605   Interdisciplinary Plan of Care Collaboration   Collaboration Comments PT eval attempted, per RN patient in a rapid response at this time. Will follow up as appropriate

## 2024-03-26 NOTE — H&P
Hospital Medicine History & Physical Note    Date of Service  3/25/2024    Primary Care Physician  KINSEY Horner.    Consultants  None    Code Status  Full Code    Chief Complaint  Chief Complaint   Patient presents with    ALOC     Patient found down by neighbors, last seen per EMS was 12-24 hours ago, patient confused, but states she did not fall, states she was going to BR and lie down on ground, reported diarrhea.         History of Presenting Illness  Marry Mathur is a 88 y.o. female who presented 3/25/2024 with altered mental status.  History limited as patient is confused at bedside.  Patient reports that she went to the restroom and caught her foot on an object, and had been lying down on the ground for a few minutes.  She was too weak to get up.  Per EMS, neighbors checked on patient and found her on the ground, unclear of how long she was actually down on the ground.  She was then brought to ER for evaluation.    In ER, patient found to have normal vital signs.  Pertinent labs include prerenal azotemia, .  UA showing many bacteria, elevated white blood cell count/nitrite.  CT head showing suspected age-indeterminate infarcts.  Chest x-ray showed no acute findings.    I discussed the plan of care with patient.    Review of Systems  ROS  Unable to assess due to acuity of condition    Past Medical History   has a past medical history of Adenocarcinoma of colon ([facility]) (10/30/2018), Anesthesia, Back pain (06/01/2022), Blood clotting disorder ([facility]) (2012), Bowel habit changes, Breath shortness (06/26/2023), Cancer ([facility]), Cataract, Chronic back pain greater than 3 months duration, Deep vein blood clot of left lower extremity ([facility]) (2022), Deep vein thrombosis ([facility]) (03/08/2016), Essential hypertension, benign (06/27/2012), GERD (gastroesophageal reflux disease) (06/27/2012), Heart murmur, High cholesterol, Hyperlipidemia, Hypertension, Impaired fasting glucose (11/02/2017),  Melanoma (HCC), Mild aortic stenosis, Other and unspecified hyperlipidemia (06/27/2012), Prediabetes, Protein S deficiency (HCC) (11/02/2017), Rheumatoid arthritis involving multiple sites with positive rheumatoid factor (HCC) (03/14/2016), Rheumatoid nodule (HCC) (07/25/2017), Right bundle branch block (06/27/2012), and Urinary incontinence (11/02/2017).    Surgical History   has a past surgical history that includes hysterectomy, total abdominal (1970's); arthroplasty (Left); cholecystectomy; other orthopedic surgery (2011); inguinal hernia repair (Right, 09/23/2016); other (08/12/2014); rotator cuff repair (Bilateral); hemicolectomy (Right, 12/13/2018); irrigation & debridement ortho (Bilateral, 07/31/2020); lumbar medial branch blocks (Bilateral, 12/15/2020); wide excision, lesion, head and neck region (Left, 03/29/2022); block epidural steroid injection (Right, 05/10/2022); knee arthroplasty total (Left); block epidural steroid injection (Right, 05/31/2022); pr remove armpits lymph nodes complt (Left, 06/07/2022); pr injection,sacroiliac joint (Right, 07/12/2022); lumbar fusion o-arm (09/21/2022); lumbar decompression (09/21/2022); lumbar laminectomy diskectomy (09/21/2022); foraminotomy (09/21/2022); other (Left); no pertinent past surgical history (2022); transcatheter aortic valve replacement (Bilateral, 9/25/2023); echocardiogram, transesophageal, intraoperative (N/A, 9/25/2023); and pacemaker insertion (Right, 9/25/2023).     Family History  family history includes Cancer in her father and mother; Heart Disease in her brother and sister; Leukemia in her father; Other in her son.   Family history reviewed with patient. There is no family history that is pertinent to the chief complaint.     Social History   reports that she has never smoked. She has never used smokeless tobacco. She reports that she does not currently use alcohol. She reports that she does not use drugs.    Allergies  Allergies   Allergen  Reactions    Wound Dressing Adhesive      Pt says band-aids cause skin reaction/rash if on for more than 2 days  Paper tape OK         Medications  Prior to Admission Medications   Prescriptions Last Dose Informant Patient Reported? Taking?   Calcium Carbonate 600 MG Tab  Patient Yes No   Sig: Take 600 mg by mouth every morning.   MAGNESIUM PO  Patient Yes No   Sig: Take 1 Tablet by mouth every day.   Melatonin 10 MG Tab  Patient Yes No   Sig: Take 10 mg by mouth every evening.   Multiple Vitamins-Minerals (PRESERVISION AREDS 2 PO)  Patient Yes No   Sig: Take 1 Capsule by mouth 2 times a day.   Polyethyl Glycol-Propyl Glycol (SYSTANE OP)  Patient Yes No   Sig: Administer 1 Drop into both eyes 2 times a day as needed.   SODIUM FLUORIDE 5000 PLUS 1.1 % Cream  Patient Yes No   Sig: BRUSH WITH PEA SIZEED AMOUNT ONCE A DAY PREFERABLY BEFORE BEDTIME. SPIT OUT ALL EXCESS. DO NOT RINSE   acetaminophen (TYLENOL) 500 MG Tab  Patient Yes No   Sig: Take 750 mg by mouth 2 times a day as needed for Mild Pain. 1.5 in am, 1 tab at noon, 2 in pm  Indications: Pain   amLODIPine (NORVASC) 2.5 MG Tab  Patient Yes No   Sig: Take 2.5 mg by mouth every evening.   atorvastatin (LIPITOR) 20 MG Tab  Patient No No   Sig: Take 1 Tablet by mouth every evening.   diphenhydrAMINE-APAP, sleep,  MG Tab  Patient Yes No   Sig: Take 2 Tablets by mouth at bedtime.   gabapentin (NEURONTIN) 300 MG Cap  Patient Yes No   Sig: Take 300 mg by mouth 3 times a day.   lidocaine (LIDODERM) 5 % Patch  Patient Yes No   Sig: APPLY 1 PATCH TO SKIN DAILY FOR 12 HOURS ON THEN 12 HOURS OFF   loperamide (IMODIUM) 2 MG Cap   No No   Sig: Take 1 Capsule by mouth 4 times a day as needed for Diarrhea for up to 7 days.   losartan (COZAAR) 50 MG Tab  Patient No No   Sig: TAKE 1 TABLET BY MOUTH EVERY DAY   omeprazole (PRILOSEC) 20 MG delayed-release capsule  Patient No No   Sig: Take 1 Capsule by mouth every morning.   polyethylene glycol/lytes (MIRALAX) 17 g Pack   "Patient No No   Sig: Take 1 Packet by mouth 1 time a day as needed (if sennosides and docusate ineffective after 24 hours).   rivaroxaban (XARELTO) 20 MG Tab tablet  Patient No No   Sig: Take 1 Tablet by mouth with dinner.   vitamin D (CHOLECALCIFEROL) 1000 UNIT Tab  Patient Yes No   Sig: Take 1,000 Units by mouth every morning.      Facility-Administered Medications: None       Physical Exam  Temp:  [36.4 °C (97.6 °F)] 36.4 °C (97.6 °F)  Pulse:  [74-89] 85  Resp:  [16-20] 20  BP: (119-130)/(58-73) 130/63  SpO2:  [94 %-96 %] 96 %  Blood Pressure : 130/63   Temperature: 36.4 °C (97.6 °F)   Pulse: 85   Respiration: 20   Pulse Oximetry: 96 %       Physical Exam  Constitutional:       Appearance: Normal appearance.      Comments: Overall generalized weakness, unreliable historian, confused   HENT:      Head: Normocephalic.      Nose: Nose normal.      Mouth/Throat:      Mouth: Mucous membranes are moist.   Eyes:      Pupils: Pupils are equal, round, and reactive to light.   Cardiovascular:      Rate and Rhythm: Normal rate and regular rhythm.      Pulses: Normal pulses.   Pulmonary:      Effort: Pulmonary effort is normal.      Breath sounds: Normal breath sounds.   Abdominal:      General: Abdomen is flat. Bowel sounds are normal.   Musculoskeletal:      Cervical back: Neck supple.   Skin:     General: Skin is warm.   Neurological:      Mental Status: She is alert. She is disoriented.      Comments: AAO x 2         Laboratory:  Recent Labs     03/25/24  1425   WBC 10.7   RBC 4.84   HEMOGLOBIN 14.3   HEMATOCRIT 42.4   MCV 87.6   MCH 29.5   MCHC 33.7   RDW 47.2   PLATELETCT 168   MPV 10.6     Recent Labs     03/25/24  1425   SODIUM 133*   POTASSIUM 4.3   CHLORIDE 102   CO2 18*   GLUCOSE 164*   BUN 21   CREATININE 0.62   CALCIUM 8.2*     Recent Labs     03/25/24  1425   ALTSGPT 36   ASTSGOT 36   ALKPHOSPHAT 68   TBILIRUBIN 0.6   GLUCOSE 164*         No results for input(s): \"NTPROBNP\" in the last 72 hours.      No results " "for input(s): \"TROPONINT\" in the last 72 hours.    Imaging:  CT-HEAD W/O   Final Result      1.  Small, rounded hypodense areas of the bilateral cerebellar hemispheres. These may represent age-indeterminate infarcts. Less likely this could represent edema related to metastatic disease.   2.  There is a small area of loss of gray-white differentiation in the left parietal lobe, possibly an age-indeterminate infarct.   3.  These findings can be further evaluated with contrast-enhanced MRI as indicated.   4.  Cerebral atrophy.   5.  White matter lucencies most consistent with      DX-CHEST-PORTABLE (1 VIEW)   Final Result      No acute findings.          X-Ray:  I have personally reviewed the images and compared with prior images.    Assessment/Plan:  Justification for Admission Status  I anticipate this patient will require at least two midnights for appropriate medical management, necessitating inpatient admission because patient has acute metabolic encephalopathy    Patient will need a Med/Surg bed on EMERGENCY service .  The need is secondary to acute metabolic encephalopathy.    * Acute metabolic encephalopathy- (present on admission)  Assessment & Plan  Patient presents with confusion.  UA pertinent for UTI, likely culprit for encephalopathy.  CT head  showing no acute changes  Ceftriaxone  Fluids  Consider MRI brain if patient does not improve    S/P TAVR (transcatheter aortic valve replacement)- (present on admission)  Assessment & Plan  Performed in September 2023    Protein S deficiency (HCC)- (present on admission)  Assessment & Plan  Continue Xarelto    History of recurrent deep vein thrombosis (DVT)- (present on admission)  Assessment & Plan  On Xarelto    ACP (advance care planning)  Assessment & Plan  16 minutes spent discussing goals of care with patient's son (Nando [phone]) via telephone.  He was explained about patient's current clinical condition and treatment plan.  He states that patient was " confused when he was on the phone with her this morning, and that she did not recognize who he was.  He spoke with her in the evening yesterday and patient appeared to be at baseline.  He was asked about CODE STATUS, as he is power of , he states that he would like patient to be full code and to have everything done, including chest compressions and intubation.  He has a flight here tomorrow afternoon    Other hyperlipidemia- (present on admission)  Assessment & Plan  Lipitor    Hypertension  Assessment & Plan  Restart        VTE prophylaxis: therapeutic anticoagulation with Xarelto

## 2024-03-26 NOTE — DISCHARGE PLANNING
Case Management Discharge Planning    Admission Date: 3/25/2024  GMLOS: 3.9  ALOS: 1    6-Clicks ADL Score: 12  6-Clicks Mobility Score: 12  PT and/or OT Eval ordered: Yes  Post-acute Referrals Ordered: Yes  Post-acute Choice Obtained: Yes  Has referral(s) been sent to post-acute provider:  Yes      Anticipated Discharge Dispo: Discharge Disposition: D/T to SNF with Medicare cert in anticipation of skilled care (03)    DME Needed: No    Action(s) Taken: LMSW sent out blanket SNF referral.     Escalations Completed: None    Medically Clear: No    Next Steps: LMSW to follow for placement.     Barriers to Discharge: Pending Placement    Is the patient up for discharge tomorrow: No

## 2024-03-26 NOTE — ASSESSMENT & PLAN NOTE
Patient presents with confusion.  UA pertinent for UTI, likely culprit for encephalopathy.  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, cardiology consulted, plan for VINAY Monday  improving

## 2024-03-26 NOTE — CODE DOCUMENTATION
Dr. Youngblood at bedside  Patient leaning to the left with deficits, AxO to person and place  Disoriented to time and situation    Code stroke called at 1612 to ER charge    Per Dr. Youngblood, orders to place are CT head without, CTA head and neck, and CT perfusion study

## 2024-03-26 NOTE — ASSESSMENT & PLAN NOTE
NAME: Dorita Cosby   YOB: 1962  PCP: Indira Diaz DO   DATE OF VISIT: 2/27/2024     HISTORY OF PRESENT ILLNESS:    Dorita Cosby is a 62 year old female who presents for follow up for secondary hyperparathyroidism. Also has osteoporosis.    Patient has been on alendronate 35mg once a week.( Dose decreased on 1/22/24)  Patient tolerates this well.  Most recent labs show Creatinine 1.06 on 2/23/24  Cr Cl-75.76  Previous creatinine of 1.57 and a GFR of 38 per labs done January 22, 2024.  Back on jardiance as well.     Recent vitamin-D level is normal-40.7 per labs done on January 22, 2024.    Most recent BMD 12/27/2022 shows osteoporosis:   Lumbar spine 0.956 g/cm2 T-score -1.9  Left femoral neck 0.676 g/cm2 T-score -2.6  Right femoral neck 0.783 g/cm2 T-score -1.8  Lowest femoral total left 0.672 g/cm2 T-score -2.7  Left forearm 0.532 g/cm2 T-score -2.5    The patient has chronic kidney disease stage 3.  She has been seeing nephrology, Lyle Lakhani MD for the same.  She does have stage III chronic kidney disease and also had lab evaluation, which identified hyperparathyroidism.      Patient does have secondary hyperparathyroidism.  She also does take Vitamin D3 supplementation 5000 international units daily.  The patient has never had hypercalcemia.  She denies any history of kidney stones.  She denies diarrhea or constipation.     She takes Tums daily. Patient eats cheese and yogurt daily. Patient is taking Vitamin D3 5000 International Units daily.   Has MNG.  Ultrasound thyroid 06/14/2023  Nodule 1 right mid 0.4x0.4x0.3 cm cystic almost completely cystic anechoic TR1  Nodule 2 left upper 0.4x0.3x0.3 cm mixed cystic and solid hypoechoic TR3    She moved from [place] to this area.     She is taking Tums for GERD occasionally. She does take Omeprazole.     She has history of multiple fractures including left knee fracture in 2019.  She also has a sister with osteoporosis and fractures.  She  Restart   did fracture her right hand middle finger a couple of years ago as well.      The patient has history of hyperthyroidism and had atrial fibrillation for the same.  She was on treatment for the same and then underwent pacemaker placement.  Subsequently, her thyroid labs did become normal.  Her most recent TSH level is 2.835 per labs done January 22, 2024 and previously 5.160 per labs done on July 10, 2023 and 06/22/2022 was normal at 2.9.  Review of records do show that patient had subclinical hypothyroidism and a TSH of 6.0 per labs done on 10/27/2017.    Patient had surgery on the left arm 04/2021 for ulnar nerve compression.     The patient does not have any family history of hyperparathyroidism.    Patient's sister has osteoporosis.     REVIEW OF SYSTEMS:   Per history of present illness, all other review of systems reviewed and negative.     HISTORIES:  I have reviewed the patient's medications and allergies, past medical, surgical, social and family history, updating these as appropriate.  See Histories section of the EMR (electronic medical record) for a display of this information.     Current Outpatient Medications   Medication Sig   • Potassium 99 MG Tab Take 297 mg by mouth.   • alendronate (FOSAMAX) 70 MG tablet Take 1 tablet by mouth every 7 days.   • empagliflozin (JARDIANCE) 10 MG tablet Take 1 tablet by mouth daily (before breakfast).   • isosorbide mononitrate (IMDUR) 60 MG 24 hr tablet TAKE 3 TABLETS EVERY DAY   • allopurinol (ZYLOPRIM) 300 MG tablet TAKE 1 TABLET EVERY DAY (AT NOON)   • montelukast (SINGULAIR) 10 MG tablet TAKE 1 TABLET EVERY NIGHT   • simvastatin (ZOCOR) 40 MG tablet TAKE 1 TABLET EVERY NIGHT   • hydrALAZINE (APRESOLINE) 10 MG tablet TAKE 2 TABLETS IN THE MORNING AND 2 TABLETS IN THE EVENING.   • budesonide (RHINOCORT ALLERGY) 32 MCG/ACT nasal spray Spray 1 spray in each nostril in the morning and 1 spray in the evening.   • clotrimazole (MYCELEX) 10 MG indu Take 1 Indu by  mouth in the morning and 1 Indu at noon and 1 Indu in the evening.   • omeprazole (PriLOSEC) 40 MG capsule Take 1 capsule by mouth in the morning and 1 capsule in the evening.   • warfarin (COUMADIN) 5 MG tablet Take 1 tab (5 mg) Sunday, MWF, 2.5 mg Tuesday, Thursday OR as directed by doctor based on INR   • bumetanide (BUMEX) 1 MG tablet Take 2 tablets by mouth twice daily   • azelastine (ASTELIN) 0.1 % nasal spray Spray 1 spray in each nostril in the morning and 1 spray in the evening. Use in each nostril as directed   • sacubitril-valsartan (ENTRESTO) 24-26 MG per tablet Take 1 tablet by mouth in the morning and 1 tablet in the evening.   • DULoxetine (CYMBALTA) 60 MG capsule Take 1 capsule by mouth daily.   • Pseudoephedrine-Acetaminophen (SINUS HEADACHE/NON-DROWSY PO)    • budesonide (PULMICORT) 0.5 MG/2ML nebulizer suspension TAKE 2 ML VIA NEBULIZER TWICE DAILY. USE WITH BROVANA ASTHMA   • formoterol (PERFOROMIST) 20 MCG/2ML inhalation solution TAKE 2 MLS BY NEBULIZATION TWO TIMES DAILY   • Alvesco 160 MCG/ACT inhaler INHALE 1 PUFF INTO THE LUNGS TWICE DAILY   • ondansetron (Zofran ODT) 8 MG disintegrating tablet Place 1 tablet onto the tongue every 8 hours as needed for Nausea.   • tiotropium (Spiriva HandiHaler) 18 MCG capsule for inhaler Place 1 capsule into inhaler and inhale daily.   • melatonin 3 MG Take 6 mg by mouth nightly.    • acetaminophen (TYLENOL) 500 MG tablet Take 1,000 mg by mouth daily.   • polyethylene glycol (MiraLax) 17 GM/SCOOP powder Take 17 g (1 capful)  by mouth daily as needed (constipation). Stir and dissolve powder in any 4 to 8 ounces of beverage, then drink.   • albuterol (VENTOLIN) (2.5 MG/3ML) 0.083% nebulizer solution Take 3 mLs by nebulization every 6 hours as needed for Shortness of Breath (cough).   • clindamycin (CLEOCIN) 300 MG capsule Take 2 pills 1 hour prior to dental cleanings   • Cholecalciferol (Vitamin D) 125 MCG (5000 UT) Cap Take 5,000 Units by mouth daily.    • fexofenadine (ALLEGRA) 180 MG tablet Take 1 tablet by mouth daily.   • Potassium 99 MG TABS Take 1 tablet by mouth daily. 1 tablet daily presently     No current facility-administered medications for this visit.       PHYSICAL EXAMINATION:    Vitals:    02/27/24 1234   BP: 133/66   BP Location: RUE - Right upper extremity   Patient Position: Sitting   Cuff Size: Large Adult   Pulse: 67   Weight: 86.2 kg (190 lb)   Height: 5' 1\" (1.549 m)        HEENT: Pupils are equal and reactive to light and accommodation. Extraocular muscles are intact. There is no lid lag. There is no stare or proptosis. Atraumatic.  NECK: Supple. Thyroid is palpable 30 g. There are no neck masses palpable. There is no cervical or supraclavicular lymphadenopathy. Trachea is midline. There is no JVD.  ABDOMEN: Soft.       LABORATORY DATA:    Lab Services on 02/23/2024   Component Date Value Ref Range Status   • Protime- PT 02/23/2024 26.9 (H)  9.7 - 11.8 sec Final   • INR 02/23/2024 2.7    Final   • Fasting Status 02/23/2024 3  0 - 999 Hours Final   • Sodium 02/23/2024 141  135 - 145 mmol/L Final   • Potassium 02/23/2024 4.0  3.4 - 5.1 mmol/L Final   • Chloride 02/23/2024 105  97 - 110 mmol/L Final   • Carbon Dioxide 02/23/2024 28  21 - 32 mmol/L Final   • Anion Gap 02/23/2024 12  7 - 19 mmol/L Final   • Glucose 02/23/2024 108 (H)  70 - 99 mg/dL Final   • BUN 02/23/2024 29 (H)  6 - 20 mg/dL Final   • Creatinine 02/23/2024 1.06 (H)  0.51 - 0.95 mg/dL Final   • Glomerular Filtration Rate 02/23/2024 60  >=60 Final   • BUN/Cr 02/23/2024 27 (H)  7 - 25 Final   • Calcium 02/23/2024 8.7  8.4 - 10.2 mg/dL Final      Lab Services on 02/23/2024   Component Date Value Ref Range Status   • Protime- PT 02/23/2024 26.9 (H)  9.7 - 11.8 sec Final   • INR 02/23/2024 2.7    Final   • Fasting Status 02/23/2024 3  0 - 999 Hours Final   • Sodium 02/23/2024 141  135 - 145 mmol/L Final   • Potassium 02/23/2024 4.0  3.4 - 5.1 mmol/L Final   • Chloride 02/23/2024 105   97 - 110 mmol/L Final   • Carbon Dioxide 02/23/2024 28  21 - 32 mmol/L Final   • Anion Gap 02/23/2024 12  7 - 19 mmol/L Final   • Glucose 02/23/2024 108 (H)  70 - 99 mg/dL Final   • BUN 02/23/2024 29 (H)  6 - 20 mg/dL Final   • Creatinine 02/23/2024 1.06 (H)  0.51 - 0.95 mg/dL Final   • Glomerular Filtration Rate 02/23/2024 60  >=60 Final   • BUN/Cr 02/23/2024 27 (H)  7 - 25 Final   • Calcium 02/23/2024 8.7  8.4 - 10.2 mg/dL Final   Lab Services on 01/22/2024   Component Date Value Ref Range Status   • Albumin 01/22/2024 4.1  3.6 - 5.1 g/dL Final   • Vitamin D, 25-Hydroxy 01/22/2024 40.7  30.0 - 100.0 ng/mL Final   • PTH, Intact 01/22/2024 143 (H)  19 - 88 pg/mL Final   • Anti Thyroglobulin 01/22/2024 <0.9  0.0 - 4.0 IU/mL Final   • Thyroid Peroxidase Antibody 01/22/2024 29  <=60 Units/mL Final   • TSH 01/22/2024 2.835  0.350 - 5.000 mcUnits/mL Final   • Fasting Status 01/22/2024 0  0 - 999 Hours Final   • Sodium 01/22/2024 140  135 - 145 mmol/L Final   • Potassium 01/22/2024 3.7  3.4 - 5.1 mmol/L Final   • Chloride 01/22/2024 103  97 - 110 mmol/L Final   • Carbon Dioxide 01/22/2024 28  21 - 32 mmol/L Final   • Anion Gap 01/22/2024 13  7 - 19 mmol/L Final   • Glucose 01/22/2024 147 (H)  70 - 99 mg/dL Final   • BUN 01/22/2024 40 (H)  6 - 20 mg/dL Final   • Creatinine 01/22/2024 1.57 (H)  0.51 - 0.95 mg/dL Final   • Glomerular Filtration Rate 01/22/2024 38 (L)  >=60 Final   • BUN/Cr 01/22/2024 25  7 - 25 Final   • Calcium 01/22/2024 9.5  8.4 - 10.2 mg/dL Final   • Microalbumin, Urine 01/22/2024 5.15  mg/dL Final   • Creatinine, Urine 01/22/2024 28.60  mg/dL Final   • Microalbumin/ Creatinine Ratio 01/22/2024 180.1 (H)  <30.0 mg/g Final   • Protein, Urine 01/22/2024 12 (H)  <12 mg/dL Final   • Creatinine, Urine 01/22/2024 26.30  mg/dL Final   • Protein/ Creatinine Ratio 01/22/2024 456 (H)  <=199 mgPR/gCR Final   • COLOR, URINALYSIS 01/22/2024 Yellow   Final   • APPEARANCE, URINALYSIS 01/22/2024 Clear   Final   •  GLUCOSE, URINALYSIS 01/22/2024 500 (A)  Negative mg/dL Final   • BILIRUBIN, URINALYSIS 01/22/2024 Negative  Negative Final   • KETONES, URINALYSIS 01/22/2024 Negative  Negative mg/dL Final   • SPECIFIC GRAVITY, URINALYSIS 01/22/2024 1.010  1.005 - 1.030 Final   • OCCULT BLOOD, URINALYSIS 01/22/2024 Small (A)  Negative Final   • PH, URINALYSIS 01/22/2024 5.0  5.0 - 7.0 Final   • PROTEIN, URINALYSIS 01/22/2024 Negative  Negative mg/dL Final   • UROBILINOGEN, URINALYSIS 01/22/2024 0.2  0.2, 1.0 mg/dL Final   • NITRITE, URINALYSIS 01/22/2024 Negative  Negative Final   • LEUKOCYTE ESTERASE, URINALYSIS 01/22/2024 Negative  Negative Final   • SQUAMOUS EPITHELIAL, URINALYSIS 01/22/2024 1 to 5  None Seen, 1 to 5 /hpf Final   • ERYTHROCYTES, URINALYSIS 01/22/2024 1 to 2  None Seen, 1 to 2 /hpf Final   • LEUKOCYTES, URINALYSIS 01/22/2024 None Seen  None Seen, 1 to 5 /hpf Final   • BACTERIA, URINALYSIS 01/22/2024 None Seen  None Seen /hpf Final   • HYALINE CASTS, URINALYSIS 01/22/2024 None Seen  None Seen, 1 to 5 /lpf Final   Lab Services on 01/10/2024   Component Date Value Ref Range Status   • Protime- PT 01/10/2024 23.9 (H)  9.7 - 11.8 sec Final   • INR 01/10/2024 2.3    Final   Lab Services on 12/08/2023   Component Date Value Ref Range Status   • INR, Capillary 12/08/2023 2.3  <=4.5 Final   Walk In on 11/28/2023   Component Date Value Ref Range Status   • POCT Rapid SARS-COV-2 by PCR 11/28/2023 Not Detected  Not Detected / Detected / Presumptive Positive / Inhibitors present Final   • POCT Influenza A by PCR 11/28/2023 Not Detected  Not Detected Final   • POCT Influenza B By PCR 11/28/2023 Not Detected  Not Detected Final   • RSV By PCR 11/28/2023 Not Detected  Not Detected Final         ASSESSMENT:    Secondary Hyperparathyroidism, Likely secondary to chronic kidney disease.  The patient has had hyperparathyroidism, dating back to 04/17/2019 when the first PTH was abnormal at 95.   Review of labs do show that the patient  has continued to have normal calcium level, which ranges from 8.3-9.1.  There is no evidence of hypercalcemia.  The patient has never had kidney stones. Low dietary calcium intake.   History of hyperthyroidism and atrial fibrillation:  The patient was on treatment for the same for 2 years.  Review of records do show that the patient had subclinical hypothyroidism, most recent TSH 2.835 per labs done January 22, 2024.  Patient is not on any thyroid medication.  Osteoporosis: Most recent BMD 12/27/2022 shows osteoporosis: Left femoral neck 0.676 g/cm2 T-score -2.6, Right femoral neck 0.783 g/cm2 T-score -1.8, Lowest femoral total left 0.672 g/cm2 T-score -2.7, Left forearm 0.532 g/cm2 T-score -2.5. Patient is on alendronate 35 mg once a week.( Dose decreased on 1/22/24).Patient tolerates this well. Most recent labs show Creatinine 1.06 on 2/23/24.Cr Cl-75.76  CKD stage 3 : Most recent labs show Creatinine 1.06 on 2/23/24.Cr Cl-75.76  Goiter: Patient does have a slightly enlarged gland.  She denies any compressive symptoms.  History of a left knee fracture and a strong family history of osteoporosis.    PLAN:  I will increase Fosamax 70 mg once a week .  Patient tolerates the medication well and has no side effects.  DEXA scan in Aug 2024.  If no significant improvement can plan reclast infusion in 6 months.  I explained the Fosamax must be taken 30 minutes before eating with a full glass of water and cannot lay down for 1 hour after taking.   I advised the patient to continue to take Vitamin D3 5000 International Units daily.   I reviewed labs with the patient.   I explained the side effects of Fosamax including acid reflux and myalgias.   Patient is advised to inform their dentist they are taking Fosamax.  The patient should call the clinic if scheduled for tooth extraction, dental implant, or fracture.  The patient should call the clinic if they experience any side effects.   I discussed home safety measures and  strategies to reduce the risk of falls.   I will have pt RTC in 6 months.  Orders Placed This Encounter   • BD DEXA AXIAL SKELETON WITH TBS   • Albumin   • Creatinine   • Vitamin D -25 Hydroxy   • Calcium   • Potassium 99 MG Tab   • DISCONTD: alendronate (FOSAMAX) 35 MG tablet   • alendronate (FOSAMAX) 70 MG tablet       An extended discussion of the above diagnoses, workup, as well as the risk/benefits of the treatment plan was conducted with the patient, who verbalized understanding.  All questions were answered.  Patient is to call if there are any problems.  Return to clinic: 6 months    I have spent 30 minutes  with the patient.    This includes pre-charting, chart review, documenting and referring/communicating with other health care professionals.     No follow-ups on file.     David Tena MD  2/27/2024

## 2024-03-27 LAB
ALBUMIN SERPL BCP-MCNC: 3 G/DL (ref 3.2–4.9)
AMMONIA PLAS-SCNC: 30 UMOL/L (ref 11–45)
BACTERIA UR CULT: ABNORMAL
BACTERIA UR CULT: ABNORMAL
BUN SERPL-MCNC: 16 MG/DL (ref 8–22)
CALCIUM ALBUM COR SERPL-MCNC: 8 MG/DL (ref 8.5–10.5)
CALCIUM SERPL-MCNC: 7.2 MG/DL (ref 8.5–10.5)
CHLORIDE SERPL-SCNC: 106 MMOL/L (ref 96–112)
CK SERPL-CCNC: 171 U/L (ref 0–154)
CO2 SERPL-SCNC: 17 MMOL/L (ref 20–33)
CREAT SERPL-MCNC: 0.6 MG/DL (ref 0.5–1.4)
ERYTHROCYTE [DISTWIDTH] IN BLOOD BY AUTOMATED COUNT: 46.6 FL (ref 35.9–50)
GFR SERPLBLD CREATININE-BSD FMLA CKD-EPI: 87 ML/MIN/1.73 M 2
GLUCOSE SERPL-MCNC: 147 MG/DL (ref 65–99)
HCT VFR BLD AUTO: 35.2 % (ref 37–47)
HGB BLD-MCNC: 12 G/DL (ref 12–16)
MCH RBC QN AUTO: 29.5 PG (ref 27–33)
MCHC RBC AUTO-ENTMCNC: 34.1 G/DL (ref 32.2–35.5)
MCV RBC AUTO: 86.5 FL (ref 81.4–97.8)
PHOSPHATE SERPL-MCNC: 2 MG/DL (ref 2.5–4.5)
PLATELET # BLD AUTO: 187 K/UL (ref 164–446)
PMV BLD AUTO: 9.8 FL (ref 9–12.9)
POTASSIUM SERPL-SCNC: 3.5 MMOL/L (ref 3.6–5.5)
PROCALCITONIN SERPL-MCNC: 0.43 NG/ML
RBC # BLD AUTO: 4.07 M/UL (ref 4.2–5.4)
SIGNIFICANT IND 70042: ABNORMAL
SITE SITE: ABNORMAL
SODIUM SERPL-SCNC: 136 MMOL/L (ref 135–145)
SOURCE SOURCE: ABNORMAL
WBC # BLD AUTO: 16.3 K/UL (ref 4.8–10.8)

## 2024-03-27 PROCEDURE — 4A10X4Z MONITORING OF CENTRAL NERVOUS ELECTRICAL ACTIVITY, EXTERNAL APPROACH: ICD-10-PCS | Performed by: PSYCHIATRY & NEUROLOGY

## 2024-03-27 PROCEDURE — 770020 HCHG ROOM/CARE - TELE (206)

## 2024-03-27 PROCEDURE — 700105 HCHG RX REV CODE 258: Performed by: STUDENT IN AN ORGANIZED HEALTH CARE EDUCATION/TRAINING PROGRAM

## 2024-03-27 PROCEDURE — 700102 HCHG RX REV CODE 250 W/ 637 OVERRIDE(OP): Performed by: INTERNAL MEDICINE

## 2024-03-27 PROCEDURE — 99232 SBSQ HOSP IP/OBS MODERATE 35: CPT | Mod: 25 | Performed by: PSYCHIATRY & NEUROLOGY

## 2024-03-27 PROCEDURE — 82140 ASSAY OF AMMONIA: CPT

## 2024-03-27 PROCEDURE — 99233 SBSQ HOSP IP/OBS HIGH 50: CPT | Performed by: INTERNAL MEDICINE

## 2024-03-27 PROCEDURE — 700102 HCHG RX REV CODE 250 W/ 637 OVERRIDE(OP): Performed by: STUDENT IN AN ORGANIZED HEALTH CARE EDUCATION/TRAINING PROGRAM

## 2024-03-27 PROCEDURE — 36415 COLL VENOUS BLD VENIPUNCTURE: CPT

## 2024-03-27 PROCEDURE — 85027 COMPLETE CBC AUTOMATED: CPT

## 2024-03-27 PROCEDURE — A9270 NON-COVERED ITEM OR SERVICE: HCPCS | Performed by: INTERNAL MEDICINE

## 2024-03-27 PROCEDURE — 80069 RENAL FUNCTION PANEL: CPT

## 2024-03-27 PROCEDURE — 700105 HCHG RX REV CODE 258: Performed by: INTERNAL MEDICINE

## 2024-03-27 PROCEDURE — 87040 BLOOD CULTURE FOR BACTERIA: CPT

## 2024-03-27 PROCEDURE — A9270 NON-COVERED ITEM OR SERVICE: HCPCS

## 2024-03-27 PROCEDURE — 84145 PROCALCITONIN (PCT): CPT

## 2024-03-27 PROCEDURE — A9270 NON-COVERED ITEM OR SERVICE: HCPCS | Performed by: STUDENT IN AN ORGANIZED HEALTH CARE EDUCATION/TRAINING PROGRAM

## 2024-03-27 PROCEDURE — 82550 ASSAY OF CK (CPK): CPT

## 2024-03-27 PROCEDURE — 95819 EEG AWAKE AND ASLEEP: CPT | Performed by: PSYCHIATRY & NEUROLOGY

## 2024-03-27 PROCEDURE — 700102 HCHG RX REV CODE 250 W/ 637 OVERRIDE(OP)

## 2024-03-27 PROCEDURE — 95819 EEG AWAKE AND ASLEEP: CPT | Mod: 26 | Performed by: PSYCHIATRY & NEUROLOGY

## 2024-03-27 PROCEDURE — 700101 HCHG RX REV CODE 250: Performed by: INTERNAL MEDICINE

## 2024-03-27 PROCEDURE — 700111 HCHG RX REV CODE 636 W/ 250 OVERRIDE (IP): Performed by: INTERNAL MEDICINE

## 2024-03-27 RX ORDER — NITROFURANTOIN 25; 75 MG/1; MG/1
100 CAPSULE ORAL 2 TIMES DAILY WITH MEALS
Status: DISCONTINUED | OUTPATIENT
Start: 2024-03-27 | End: 2024-03-28

## 2024-03-27 RX ORDER — SODIUM CHLORIDE 9 MG/ML
INJECTION, SOLUTION INTRAVENOUS CONTINUOUS
Status: DISCONTINUED | OUTPATIENT
Start: 2024-03-27 | End: 2024-03-31

## 2024-03-27 RX ADMIN — GABAPENTIN 300 MG: 300 CAPSULE ORAL at 04:48

## 2024-03-27 RX ADMIN — ATORVASTATIN CALCIUM 20 MG: 20 TABLET, FILM COATED ORAL at 17:48

## 2024-03-27 RX ADMIN — ANTACID TABLETS 500 MG: 500 TABLET, CHEWABLE ORAL at 17:47

## 2024-03-27 RX ADMIN — NITROFURANTOIN MONOHYDRATE/MACROCRYSTALS 100 MG: 75; 25 CAPSULE ORAL at 17:48

## 2024-03-27 RX ADMIN — AMPICILLIN SODIUM 2000 MG: 2 INJECTION, POWDER, FOR SOLUTION INTRAVENOUS at 17:54

## 2024-03-27 RX ADMIN — GABAPENTIN 300 MG: 300 CAPSULE ORAL at 17:48

## 2024-03-27 RX ADMIN — NITROFURANTOIN MONOHYDRATE/MACROCRYSTALS 100 MG: 75; 25 CAPSULE ORAL at 09:26

## 2024-03-27 RX ADMIN — ANTACID TABLETS 500 MG: 500 TABLET, CHEWABLE ORAL at 04:48

## 2024-03-27 RX ADMIN — SODIUM CHLORIDE: 9 INJECTION, SOLUTION INTRAVENOUS at 20:22

## 2024-03-27 RX ADMIN — RIVAROXABAN 20 MG: 20 TABLET, FILM COATED ORAL at 17:48

## 2024-03-27 RX ADMIN — AMPICILLIN SODIUM 2000 MG: 2 INJECTION, POWDER, FOR SOLUTION INTRAVENOUS at 23:35

## 2024-03-27 RX ADMIN — AMLODIPINE BESYLATE 2.5 MG: 5 TABLET ORAL at 17:47

## 2024-03-27 RX ADMIN — POTASSIUM PHOSPHATE, MONOBASIC AND POTASSIUM PHOSPHATE, DIBASIC 30 MMOL: 224; 236 INJECTION, SOLUTION, CONCENTRATE INTRAVENOUS at 09:27

## 2024-03-27 RX ADMIN — AMPICILLIN SODIUM 2000 MG: 2 INJECTION, POWDER, FOR SOLUTION INTRAVENOUS at 13:49

## 2024-03-27 RX ADMIN — LOSARTAN POTASSIUM 50 MG: 50 TABLET, FILM COATED ORAL at 04:48

## 2024-03-27 RX ADMIN — SODIUM CHLORIDE: 9 INJECTION, SOLUTION INTRAVENOUS at 00:39

## 2024-03-27 ASSESSMENT — ENCOUNTER SYMPTOMS
CHILLS: 0
DEPRESSION: 0
VOMITING: 0
FEVER: 0
WEAKNESS: 1
SENSORY CHANGE: 1
MYALGIAS: 0
ABDOMINAL PAIN: 0
HEADACHES: 0
NAUSEA: 0
SHORTNESS OF BREATH: 0
FALLS: 1

## 2024-03-27 ASSESSMENT — FIBROSIS 4 INDEX: FIB4 SCORE: 2.59

## 2024-03-27 ASSESSMENT — PAIN DESCRIPTION - PAIN TYPE: TYPE: ACUTE PAIN

## 2024-03-27 NOTE — DIETARY
"Nutrition services: Day 2 of admit.  Marry Mathur is a 88 y.o. female with admitting DX of Acute metabolic encephalopathy   Consult received for malnutrition screening tool score of 2 for 2-13lb wt loss x 1 month and poor PO.    Visited pt at bedside. Pt was sleeping soundly. Pt's son was at bedside. Son said that since pt's prior hospitalization, pt's PO has decreased. Son said that pt gets easily tired while eating/chewing. Pt requested soft foods for pt. Son thinks that supplements and other soft foods (i.e mashed potatoes, greek yogurt, cottage cheese, soup) are a good idea to add to pt's meals to help reduce fatigue while eating. When RD asked if son knew if pt has lost wt, son said that she \"has to have.\" Son noted that pt was intentionally losing wt for the past 9-12 months.    Nutrition focused physical exam not appropriate at this time.     Assessment:  Height: 157.5 cm (5' 2\")  Weight: 69.4 kg (153 lb) via estimated wt. Voalted RN about getting new wt.  Body mass index is 27.98 kg/m²., BMI classification: Overweight  Diet/Intake: Regular; PO % x 1 this admit.    Evaluation:   Per H&P: \"Patient found down by neighbors, last seen per EMS was 12-24 hours ago, patient confused, but states she did not fall, states she was going to BR and lie down on ground, reported diarrhea.\"    Meds: Lipitor, potassium phosphate IVPB  Labs 3/27: K+ 3.5, phos 2.0  Skin: No wounds documented for this admit. No edema  +BM: 3/26, smear  Pt's wts have been relatively steady per chart review.  Wt Readings from Last Encounters:   03/27/24 73.7 kg (162 lb 7.7 oz)   01/15/24 73.3 kg (161 lb 9.6 oz)   01/11/24 72.6 kg (160 lb)   12/28/23 73 kg (161 lb)   10/31/23 71.2 kg (157 lb)   09/26/23 67 kg (147 lb 11.3 oz)   09/13/23 72 kg (158 lb 12.8 oz)   06/29/23 71.2 kg (156 lb 15.5 oz)   06/13/23 71.2 kg (157 lb)   05/24/23 72.1 kg (158 lb 15.2 oz)   04/28/23 69.4 kg (153 lb)   04/03/23 67.6 kg (149 lb)   7. Per " flowsheets, last night pt A&0x0.     Malnutrition Risk: Unable to determine at this time.    Recommendations/Plan:  Add Ensure Plus BID.  Add pt food preferences.   Encourage intake of >50%  Document intake of all meals as % taken in ADL's to provide interdisciplinary communication across all shifts.   Monitor weight.  Nutrition rep will continue to see patient for ongoing meal and snack preferences.     RD following.

## 2024-03-27 NOTE — PROGRESS NOTES
"During assessment patient is A&O X0 unable to answer any questions. Pt was able to open eyes for a few seconds and state she was \"okay\" when RN, and CNA changed her. Pt very lethargic.     RN spoke to son Nando via telephone. MD Youngblood will call him in the morning to discuss CT findings and additional testing needed (MRI).   "

## 2024-03-27 NOTE — PROCEDURES
[facility]    Inpatient Standard Video Electroencephalogram Report      Patient Name: Marry Mathur  MRN: [id number]  #: [id number]  Date of Service: 03/27/24.  Total Recording Time: 0 hours and 27 minutes.  Referring Provider: Patricia Youngblood D.O.    INDICATION:  Marry Mathur 88 y.o. female. This standard, inpatient, EEG was requested to evaluate for seizure(s).    CURRENT ANTI-SEIZURE AND OTHER PERTINENT MEDICATIONS:     Current Facility-Administered Medications:     nitrofurantoin    potassium phosphate    calcium carbonate    ampicillin    haloperidol lactate    amLODIPine    atorvastatin    gabapentin    losartan    rivaroxaban    acetaminophen    TECHNIQUE: Standard inpatient video EEG was set up by a Neurodiagnostic technologist who performed education to the patient and staff. A minimum of 23 electrodes and 23 channel recording was setup and performed by Neurodiagnostic technologist, in accordance with the international 10-20 system. Impedence, electrode integrity, and technical impressions were documented. The study was reviewed in bipolar and referential montages. The recording examined the patient in the awake, drowsy, and sleep state(s).     DESCRIPTION OF THE RECORD:  EEG background: The background was continuous, asymmetrical, and poorly defined and/or fragmented 6-7 Hz posterior dominant rhythm, with a mixture of theta and delta frequencies . Reactivity was seen. Spontaneous variability was  was seen. State changes were were seen.    Occasional N2 sleep transients in the form of rudimentary and/or ill-defined sleep spindles fragments and vertex waves were seen in the leads over the central regions.     ACTIVATION PROCEDURES:   Intermittent Photic stimulation was performed in a stepwise fashion from 1 to 30 Hz and did not elicited any additional abnormalities on EEG.     ICTAL AND INTERICTAL FINDINGS:   There was additional, almost continuous, left temporal focal slowing  noted.    There were runs of sharply contoured waveforms noted, at times occurring in semi-rhythmic fashion, more prominent over the right.     No clearly evolving electroclinical or electrographic seizures were reported or recorded during the study.     EKG: Sampling of the EKG recording showed irregular heart rhythm.     EVENTS:  No clinical events recorded or reported    INTERPRETATION:  This was an abnormal video EEG recording in the awake, drowsy, and sleep state(s):  Diffuse slowing of the background, suggestive of diffuse and/or multifocal cerebral dysfunction.  This finding might be seen in a myriad of encephalopathies and in itself is a nonspecific finding.  The presence of additional, almost continuous, left temporal, focal slowing, is suggestive of additional cerebral dysfunction in that region.  This finding might be seen in a myriad of encephalopathies and in itself is a nonspecific finding.  There were runs of sharply contoured waveforms noted, at times occurring in the semi-rhythmic fashion, more prominent over the right, with uncertain clinical significance at this time (may be related to the underling encephalopathy), but might point toward increased propensity toward seizures.  If clinical suspicion for seizures persists, a prolonged EEG might be considered.  No clearly evolving electrographic seizures were captured.  Single-lead EKG showed irregular heart rhythm.    Roman Stanley MD  Neurology Attending, Epilepsy Program  [facility]

## 2024-03-27 NOTE — CARE PLAN
The patient is Watcher - Medium risk of patient condition declining or worsening    Shift Goals  Clinical Goals: Increase in oritentation and alertness throughout the night  Patient Goals: TIM  Family Goals: updates    Progress made toward(s) clinical / shift goals:  At beginning of shift pt was unable to answer any questions and only responded with head gestures. Pt currently A&Ox2, self and place and able to answer/talk to RN. Pt has call light and belongings in reach, bed in lowest position, hourly rounding in place     Patient is not progressing towards the following goals:      Problem: Knowledge Deficit - Standard  Goal: Patient and family/care givers will demonstrate understanding of plan of care, disease process/condition, diagnostic tests and medications  Outcome: Not Progressing     Problem: Fall Risk  Goal: Patient will remain free from falls  Outcome: Not Progressing

## 2024-03-27 NOTE — PROGRESS NOTES
"Frequent reassessment throughout night, pt RR elevated with swallow breaths. Pt responds to voice, nodding her head \"yes\" if asked if she was okay.   Will attempt to call son and pt Nando CHACON, in morning to complete MRI screening form.   "

## 2024-03-27 NOTE — PROGRESS NOTES
[facility] Medicine Daily Progress Note    Date of Service  3/27/2024    Chief Complaint  Marry Mathur is a 88 y.o. female admitted 3/25/2024 with altered mental status    Hospital Course  88 y.o. female who presented 3/25/2024 with altered mental status.  History limited as patient is confused at bedside.  Patient reports that she went to the restroom and caught her foot on an object, and had been lying down on the ground for a few minutes.  She was too weak to get up.  Per EMS, neighbors checked on patient and found her on the ground, unclear of how long she was actually down on the ground.  She was then brought to ER for evaluation.     In ER, patient found to have normal vital signs.  Pertinent labs include prerenal azotemia, .  UA showing many bacteria, elevated white blood cell count/nitrite.  CT head showing suspected age-indeterminate infarcts.  Chest x-ray showed no acute findings.    Interval Problem Update  3/26 Vital signs stable on room air  Calcium 8.1, start calcium carbonate twice daily  , renal function stable  Urine culture pending, recent urine culture 3/20 with pansensitive E. Coli  Blood culture 1/2 with possible strep  -repeat bcx ordered for tomorrow   Patient is alert and oriented to person, place and time but not situation.  Patient today saying that she did not fall at home that she was lying on the carpet because it is soft.  Has no complaints at bedside today, denies abdominal pain, dysuria.    Addendum: Notified by nursing that patient had acute change in neuro exam, very lethargic disoriented. Rapid called, evaluated patient at bedside. Patient with leftward lean, very lethargic wakes to sternal rub. Answering some intermittent questions. Inially not moving left arm or leg or hold against gravity. Code Stroke called, discussed with neurology. Patient with some improvement held her left arm against gravity. Plan for stat CTA head and neck. I have ordered EEG and  patient noted to have BM in the chair. Discussed plan with patient's son and son-in-law at bedside     3/27 patient tachypneic otherwise vital stable.  New leukocytosis, WBC 16  Potassium 3.5, phosphorus 2.  IV replacement ordered   Procalcitonin elevated.  Ammonia negative, CPK down to 1.71, DC IV fluids.  Urine culture with E. coli  Pending blood cultures  Discussed with vascular neurology and, unlikely to be stroke recommended MRI of the brain to complete workup  EEG was abnormal and showed diffuse slowing in the background, presence of additional left temporal focal slowing as well as runs of sharply contoured waveforms.  Discussed with the general neurology, changed MRI to with and without contrast and they will come evaluate the patient.   Patient this morning was alert though very tired appearing.  Reported decreased strength in bilateral lower extremities left worse than right.  Also reported new left leg decreased sensation.  This afternoon patient again had an event where she was very difficult to wake up even with sternal rub.  Neurology to determine if continuous EEG is needed.    I have discussed this patient's plan of care and discharge plan at IDT rounds today with Case Management, Nursing, Nursing leadership, and other members of the IDT team.    Consultants/Specialty  General Neurology   Vascular Neurology    Code Status  Full Code    Disposition  The patient is not medically cleared for discharge to home or a post-acute facility.      I have placed the appropriate orders for post-discharge needs.    Review of Systems  Review of Systems   Constitutional:  Positive for malaise/fatigue. Negative for chills and fever.   Respiratory:  Negative for shortness of breath.    Cardiovascular:  Negative for chest pain.   Gastrointestinal:  Negative for abdominal pain, nausea and vomiting.   Musculoskeletal:  Positive for falls. Negative for myalgias.   Skin:  Negative for rash.   Neurological:  Positive for  sensory change and weakness. Negative for headaches.   Psychiatric/Behavioral:  Negative for depression.    All other systems reviewed and are negative.       Physical Exam  Temp:  [37.3 °C (99.2 °F)-37.7 °C (99.8 °F)] 37.3 °C (99.2 °F)  Pulse:  [76-97] 82  Resp:  [19-26] 19  BP: (137-156)/(61-81) 145/81  SpO2:  [91 %-94 %] 94 %    Physical Exam  Vitals and nursing note reviewed.   Constitutional:       General: She is not in acute distress.     Appearance: She is ill-appearing (chronically).      Comments: Son at bedside    HENT:      Head: Normocephalic and atraumatic.      Mouth/Throat:      Pharynx: Oropharynx is clear.   Eyes:      Conjunctiva/sclera: Conjunctivae normal.   Cardiovascular:      Rate and Rhythm: Normal rate and regular rhythm.      Pulses: Normal pulses.   Pulmonary:      Effort: Pulmonary effort is normal. No respiratory distress.      Breath sounds: Normal breath sounds. No wheezing.   Abdominal:      General: Abdomen is flat. There is no distension.      Palpations: Abdomen is soft.      Tenderness: There is no abdominal tenderness.   Musculoskeletal:         General: Normal range of motion.      Cervical back: Neck supple.      Right lower leg: No edema.      Left lower leg: No edema.   Skin:     General: Skin is warm and dry.   Neurological:      General: No focal deficit present.      Mental Status: She is alert.      Cranial Nerves: No cranial nerve deficit.      Motor: Weakness present.      Comments: A&Ox3 this morning   UE 5/5 strength  LE 2/5 right worse    Psychiatric:         Mood and Affect: Mood is anxious.         Fluids  No intake or output data in the 24 hours ending 03/27/24 1706      Laboratory  Recent Labs     03/25/24  1425 03/26/24  0415 03/27/24  0409   WBC 10.7 10.0 16.3*   RBC 4.84 3.99* 4.07*   HEMOGLOBIN 14.3 11.7* 12.0   HEMATOCRIT 42.4 35.4* 35.2*   MCV 87.6 88.7 86.5   MCH 29.5 29.3 29.5   MCHC 33.7 33.1 34.1   RDW 47.2 47.8 46.6   PLATELETCT 168 172 187   MPV 10.6  10.1 9.8     Recent Labs     03/25/24  1425 03/26/24  0415 03/27/24  0409   SODIUM 133* 135 136   POTASSIUM 4.3 3.9 3.5*   CHLORIDE 102 108 106   CO2 18* 17* 17*   GLUCOSE 164* 115* 147*   BUN 21 15 16   CREATININE 0.62 0.51 0.60   CALCIUM 8.2* 7.3* 7.2*                   Imaging  CT-CTA NECK WITH & W/O-POST PROCESSING   Final Result      No focal high-grade stenosis, dissection or occlusion of the cervical carotid or vertebral arteries.      CT-CTA HEAD WITH & W/O-POST PROCESS   Final Result      1.  Short segment decreased enhancement in the RIGHT superior sylvian division of middle superior artery, M3 segment, focal stenosis versus nonocclusive clot.   2.  No intracranial aneurysm or abrupt large vessel cut off.      CT-HEAD W/O   Final Result      1.  Hypoattenuating foci in the bilateral cerebellum which are indeterminant. They could be subacute infarcts versus other. Recommend brain MRI workup.   2.  Redemonstration of a small focus of low-attenuation in the left parietal deep and subcortical white matter. Possibly gliosis versus subacute infarct.   3.  Findings are stable since the noncontrast head CT performed one day prior.         CT-HEAD W/O   Final Result      1.  Small, rounded hypodense areas of the bilateral cerebellar hemispheres. These may represent age-indeterminate infarcts. Less likely this could represent edema related to metastatic disease.   2.  There is a small area of loss of gray-white differentiation in the left parietal lobe, possibly an age-indeterminate infarct.   3.  These findings can be further evaluated with contrast-enhanced MRI as indicated.   4.  Cerebral atrophy.   5.  White matter lucencies most consistent with      DX-CHEST-PORTABLE (1 VIEW)   Final Result      No acute findings.      MR-BRAIN-WITH & W/O    (Results Pending)        Assessment/Plan  * Acute metabolic encephalopathy- (present on admission)  Assessment & Plan  Patient presents with confusion.  UA pertinent for  UTI, likely culprit for encephalopathy.  CT head showing no acute changes  Antibiotics for UTI   S/p fluids  On 3/26 code stroke was activated, per vascular neurology they think stroke unlikely   -CTA did have some narrowing, recommended to keep blood pressure above 120  MRI brain with and without pending  EEG showed abnormal changes  General neurology consulted, appreciate recommendations    Positive blood culture  Assessment & Plan  1/2 bc from admission- possible strep   -repeat bcx in am   Suspect contaminant    S/P TAVR (transcatheter aortic valve replacement)- (present on admission)  Assessment & Plan  Performed in September 2023    Protein S deficiency (HCC)- (present on admission)  Assessment & Plan  Continue Xarelto    History of recurrent deep vein thrombosis (DVT)- (present on admission)  Assessment & Plan  On Xarelto    ACP (advance care planning)  Assessment & Plan  16 minutes spent discussing goals of care with patient's son (Nando [phone]) via telephone.  He was explained about patient's current clinical condition and treatment plan.  He states that patient was confused when he was on the phone with her this morning, and that she did not recognize who he was.  He spoke with her in the evening yesterday and patient appeared to be at baseline.  He was asked about CODE STATUS, as he is power of , he states that he would like patient to be full code and to have everything done, including chest compressions and intubation.  He has a flight here tomorrow afternoon    Other hyperlipidemia- (present on admission)  Assessment & Plan  Lipitor    Hypertension  Assessment & Plan  Restart         VTE prophylaxis:    therapeutic anticoagulation with xarelto 20 mg daily witih meals    I have performed a physical exam and reviewed and updated ROS and Plan today (3/27/2024). In review of yesterday's note (3/26/2024), there are no changes except as documented above.

## 2024-03-27 NOTE — PROGRESS NOTES
Rapid Response Summary     Rapid response called at 1558 for: Altered mental status , Stroke like symptoms , and Lethargy     VS: WDL (See Vitals Flowsheet)  Additional info: Patient admitted for altered menatl status, per bedside RN, patient was AxO3, now AxO 1-2 and very lethargic with left sided weakness  MD Paged: Dr. Youngblood and Dr. Child  Interventions:    Imaging/Tests: CT   Labs: Blood Glucose    Medications:  n/a   Other: N/A  Disposition: Patient taken to CT for CT head W/o, and CTA head and neck with rapid response team per neurology orders. Primary RN updated on plan of care. Transfer not indicated at this time.  and Rapid team will continue to follow the patient.

## 2024-03-27 NOTE — PROGRESS NOTES
Pt awake and A&O x2, self and place. Time and situation still unable to answer. Pt did ask for sips of water and chap stick.

## 2024-03-27 NOTE — CONSULTS
Neurology Initial Consult H&P  Neurohospitalist Service, [facility]    Referring Physician: Patricia Youngblood D.O.    Chief Complaint   Patient presents with    ALOC     Patient found down by neighbors, last seen per EMS was 12-24 hours ago, patient confused, but states she did not fall, states she was going to BR and lie down on ground, reported diarrhea.         HPI: Marry Mathur is a 88 y.o. female with PMH of DVT in setting of protein S deficiency on Xarelto, HTN, HLD who presented to [facility] after a fall and altered mental status.  Patient was found to have UA concerning for underlying UTI which is most likely culprit for her encephalopathy upon admission.  Stroke alert called this afternoon after patient was found lethargic slumped to the left side with reports of diminished muscle activity in left upper and left lower extremity.  Upon evaluation at bedside patient had nearly equal strength in bilateral upper extremities but both drifts to the bed left slightly worse than right.  Of note tone appears appropriate in bilateral upper extremities.  Patient was confused, not oriented, poor comprehension and follows almost no commands.  Patient did have pain in bilateral upper and lower extremities and did not have any demonstrable cranial nerve abnormalities.  Decision was made to obtain follow-up CT head CTA head and neck imaging to rule out vessel occlusion.  Overall suspicion was low for an acute ischemic process secondary to no overt localizing signs, fluctuating symptoms since admission, predominance of symptoms referable to mental status changes.  Did discuss with provider at bedside and did encourage them to follow-up with her workup for encephalopathy with low suspicion for stroke.     Review of systems: In addition to what is detailed in the HPI above, all other systems reviewed and are negative.    Past Medical History:    has a past medical history of Adenocarcinoma of  colon (HCC) (10/30/2018), Anesthesia, Back pain (06/01/2022), Blood clotting disorder ([facility]) (2012), Bowel habit changes, Breath shortness (06/26/2023), Cancer (HCC), Cataract, Chronic back pain greater than 3 months duration, Deep vein blood clot of left lower extremity ([facility]) (2022), Deep vein thrombosis (HCC) (03/08/2016), Essential hypertension, benign (06/27/2012), GERD (gastroesophageal reflux disease) (06/27/2012), Heart murmur, High cholesterol, Hyperlipidemia, Hypertension, Impaired fasting glucose (11/02/2017), Melanoma (HCC), Mild aortic stenosis, Other and unspecified hyperlipidemia (06/27/2012), Prediabetes, Protein S deficiency ([facility]) (11/02/2017), Rheumatoid arthritis involving multiple sites with positive rheumatoid factor ([facility]) (03/14/2016), Rheumatoid nodule ([facility]) (07/25/2017), Right bundle branch block (06/27/2012), and Urinary incontinence (11/02/2017).    She has no past medical history of Acute nasopharyngitis, Anginal syndrome ([facility]), Asthma, Carcinoma in situ of respiratory system, Congestive heart failure (HCC), Continuous ambulatory peritoneal dialysis status ([facility]), Coughing blood, Dental disorder, Diabetes (HCC), Dialysis patient ([facility]), Disorder of thyroid, Emphysema of lung (HCC), Glaucoma, Gynecological disorder, Heart burn, Hepatitis A, Hepatitis B, Hepatitis C, Hiatus hernia syndrome, Indigestion, Infectious disease, Jaundice, Myocardial infarct ([facility]), Pacemaker, Pneumonia, Pregnant, Psychiatric problem, Renal disorder, Rheumatic fever, Seizure (HCC), Sleep apnea, Snoring, Stroke ([facility]), Tuberculosis, or Urinary bladder disorder.    FHx:  family history includes Cancer in her father and mother; Heart Disease in her brother and sister; Leukemia in her father; Other in her son.    SHx:   reports that she has never smoked. She has never used smokeless tobacco. She reports that she does not currently use alcohol. She reports that she does not use drugs.    Allergies:  Allergies   Allergen  Reactions    Wound Dressing Adhesive      Pt says band-aids cause skin reaction/rash if on for more than 2 days  Paper tape OK         Medications:    Current Facility-Administered Medications:     calcium carbonate (Tums) chewable tab 500 mg, 500 mg, Oral, BID, Patricia Youngblood D.O., 500 mg at 03/26/24 0834    [START ON 3/27/2024] ampicillin (Omnipen) 2,000 mg in  mL IVPB, 2,000 mg, Intravenous, Q6HRS, ITALIA Zavala.ELLA.    amLODIPine (Norvasc) tablet 2.5 mg, 2.5 mg, Oral, Q EVENING, Selwyn Waters M.D., 2.5 mg at 03/25/24 2016    atorvastatin (Lipitor) tablet 20 mg, 20 mg, Oral, Q EVENING, Selwyn Waters M.D., 20 mg at 03/25/24 2016    gabapentin (Neurontin) capsule 300 mg, 300 mg, Oral, BID, Selwyn Waters M.D., 300 mg at 03/26/24 0543    losartan (Cozaar) tablet 50 mg, 50 mg, Oral, DAILY, Selwyn Waters M.D., 50 mg at 03/26/24 0543    rivaroxaban (Xarelto) tablet 20 mg, 20 mg, Oral, PM MEAL, Selwyn Waters M.D.    NS infusion, , Intravenous, Continuous, Selwyn Waters M.D., Last Rate: 83 mL/hr at 03/26/24 0200, New Bag at 03/26/24 0200    acetaminophen (Tylenol) tablet 650 mg, 650 mg, Oral, Q6HRS PRN, Selwyn Waters M.D.      NEUROLOGICAL EXAM:     MENTAL STATUS: Drowsy, arousable, forted age is 89 and month is August  LANG/SPEECH: Unable to follow any consistent commands, would not smile, make a fist, did close and open eyes on command    CRANIAL NERVES:  II: Pupils equal and reactive, BTT positive bilaterally  III, IV, VI: No overt nystagmus  V: Unable to assess  VII: no obvious asymmetry, no nasolabial fold flattening  VIII: normal hearing to speech  IX, X: Could not assess  XI: Shoulders equal height  XII: Could not assess    MOTOR: Had demonstrable bilateral upper extremity antigravity movement but both drift to bed left slightly worse compared to right.  Bilateral lower extremities dropped to bed no obvious movement    REFLEXES: no clonus  SENSORY: Responds with grimace  and vocal response to pinch in bilateral upper and lower extremities  COORD: Would not follow command to complete  GAIT: Deferred      NIHSS: National Institutes of Health Stroke Scale    [1] 1a:Level of Consciousness    0-alert 1-drowsy   2-stupor   3-coma  [2] 1b:LOC Questions                  0-both  1-one      2-neither  [2] 1c:LOC Commands                   0-both  1-one      2-neither  [0] 2: Best Gaze                     0-nl    1-partial  2-forced  [0] 3: Visual Fields                   0-nl    1-partial  2-complete 3-bilat  [0] 4: Facial Paresis                0-nl    1-minor    2-partial  3-full  MOTOR                       0-nl  [2] 5: Right Arm           1-drift  [2] 6: Left Arm             2-some effort vs gravity  [4] 7: Right Leg           3-no effort vs gravity  [4] 8: Left Leg             4-no movement                             x-untestable  [0] 9: Limb Ataxia                    0-abs   1-1_limb   2-2+_limbs       x-untestable  [0] 10:Sensory                        0-nl    1-partial  2-dense  [1] 11:Best Language/Aphasia         0-nl    1-mild/mod 2-severe   3-mute  [1] 12:Dysarthria                     0-nl    1-mild/mod 2-severe       x-untestable  [0] 13:Neglect/Inattention            0-none  1-partial  2-complete  [19] TOTAL (Non localizing exam. Slightly more drift in LUE compared to RUE)      Objective Data:    Labs:  Lab Results   Component Value Date/Time    PROTHROMBTM 24.8 (H) 10/09/2023 11:25 AM    INR 1.30 (A) 12/15/2023 02:58 PM      Lab Results   Component Value Date/Time    WBC 10.0 03/26/2024 04:15 AM    RBC 3.99 (L) 03/26/2024 04:15 AM    HEMOGLOBIN 11.7 (L) 03/26/2024 04:15 AM    HEMATOCRIT 35.4 (L) 03/26/2024 04:15 AM    MCV 88.7 03/26/2024 04:15 AM    MCH 29.3 03/26/2024 04:15 AM    MCHC 33.1 03/26/2024 04:15 AM    MPV 10.1 03/26/2024 04:15 AM    NEUTSPOLYS 75.90 (H) 03/26/2024 04:15 AM    LYMPHOCYTES 11.40 (L) 03/26/2024 04:15 AM    MONOCYTES 10.00 03/26/2024 04:15 AM     EOSINOPHILS 1.90 03/26/2024 04:15 AM    BASOPHILS 0.50 03/26/2024 04:15 AM      Lab Results   Component Value Date/Time    SODIUM 135 03/26/2024 04:15 AM    POTASSIUM 3.9 03/26/2024 04:15 AM    CHLORIDE 108 03/26/2024 04:15 AM    CO2 17 (L) 03/26/2024 04:15 AM    GLUCOSE 115 (H) 03/26/2024 04:15 AM    BUN 15 03/26/2024 04:15 AM    CREATININE 0.51 03/26/2024 04:15 AM      Lab Results   Component Value Date/Time    CHOLSTRLTOT 158 10/23/2023 07:09 AM    LDL 84 10/23/2023 07:09 AM    HDL 57 10/23/2023 07:09 AM    TRIGLYCERIDE 83 10/23/2023 07:09 AM       Lab Results   Component Value Date/Time    ALKPHOSPHAT 54 03/26/2024 04:15 AM    ASTSGOT 30 03/26/2024 04:15 AM    ALTSGPT 29 03/26/2024 04:15 AM    TBILIRUBIN 0.6 03/26/2024 04:15 AM        Imaging/Testing:    I interpreted and/or reviewed the patient's neuroimaging    CT-CTA NECK WITH & W/O-POST PROCESSING   Final Result      No focal high-grade stenosis, dissection or occlusion of the cervical carotid or vertebral arteries.      CT-CTA HEAD WITH & W/O-POST PROCESS   Final Result      1.  Short segment decreased enhancement in the RIGHT superior sylvian division of middle superior artery, M3 segment, focal stenosis versus nonocclusive clot.   2.  No intracranial aneurysm or abrupt large vessel cut off.      CT-HEAD W/O   Final Result      1.  Hypoattenuating foci in the bilateral cerebellum which are indeterminant. They could be subacute infarcts versus other. Recommend brain MRI workup.   2.  Redemonstration of a small focus of low-attenuation in the left parietal deep and subcortical white matter. Possibly gliosis versus subacute infarct.   3.  Findings are stable since the noncontrast head CT performed one day prior.         CT-HEAD W/O   Final Result      1.  Small, rounded hypodense areas of the bilateral cerebellar hemispheres. These may represent age-indeterminate infarcts. Less likely this could represent edema related to metastatic disease.   2.  There is a  small area of loss of gray-white differentiation in the left parietal lobe, possibly an age-indeterminate infarct.   3.  These findings can be further evaluated with contrast-enhanced MRI as indicated.   4.  Cerebral atrophy.   5.  White matter lucencies most consistent with      DX-CHEST-PORTABLE (1 VIEW)   Final Result      No acute findings.          Assessment and Plan:   Marry Mathur is a 88 y.o. female with PMH of DVT in setting of protein S deficiency on Xarelto, HTN, HLD who presented to [facility] after a fall and altered mental status.  UA on admission positive for nitrites and bacteria concerning for underlying UTI.  Additionally patient reportedly had 1 out of 2 positive blood cultures.  Stroke alert called for acute mental status changes and reports of left-sided weakness that was trace if present upon my evaluation.  Follow-up CTA head and neck without large vessel occlusion, dissection, other vascular abnormality. Radiologist report does indicate there is questionable distal Rt-MCA territory vessel stenosis vs occlusion.  CT head with stable age-related changes.  In the setting of predominantly mental status changes concern for an acute ischemic process was low at this time.  Did encourage primary team to follow-up for generalized encephalopathy consult although an infectious etiology does appear to be most likely at this time.  Can consider MRI brain as a follow-up in the setting of questionable left motor changes compared to right.    Problem list:  -- Altered mental status  -- Left-sided weakness      Recommendations:  -- Continue active work up and management of underlying infectious etiology for patient's change in mental status  -- While mental status changes unlikely referable to acute ischemic process new trace left sided weakness may represent either new or chronic stroke symptoms; can pursue follow up MRI brain to rule out small vessel ischemic event, additionally, CTA head reported by  radiology to possibly demonstrate distal Rt-M3 stenosis vs occlusion  -- MRI brain w/o contrast  -- Please call with questions or concerns    Alejandro Gay III, MD  Vascular Neurology, [facility]

## 2024-03-28 ENCOUNTER — APPOINTMENT (OUTPATIENT)
Dept: RADIOLOGY | Facility: MEDICAL CENTER | Age: 89
DRG: 288 | End: 2024-03-28
Attending: INTERNAL MEDICINE
Payer: MEDICARE

## 2024-03-28 PROBLEM — N30.00 ACUTE CYSTITIS WITHOUT HEMATURIA: Status: ACTIVE | Noted: 2024-03-28

## 2024-03-28 LAB
ANION GAP SERPL CALC-SCNC: 13 MMOL/L (ref 7–16)
BACTERIA BLD CULT: NORMAL
BACTERIA BLD CULT: NORMAL
BUN SERPL-MCNC: 12 MG/DL (ref 8–22)
CALCIUM SERPL-MCNC: 6.8 MG/DL (ref 8.5–10.5)
CHLORIDE SERPL-SCNC: 104 MMOL/L (ref 96–112)
CO2 SERPL-SCNC: 17 MMOL/L (ref 20–33)
CREAT SERPL-MCNC: 0.39 MG/DL (ref 0.5–1.4)
CRP SERPL HS-MCNC: 22.49 MG/DL (ref 0–0.75)
EKG IMPRESSION: NORMAL
ERYTHROCYTE [DISTWIDTH] IN BLOOD BY AUTOMATED COUNT: 47.9 FL (ref 35.9–50)
ERYTHROCYTE [SEDIMENTATION RATE] IN BLOOD BY WESTERGREN METHOD: 34 MM/HOUR (ref 0–25)
GFR SERPLBLD CREATININE-BSD FMLA CKD-EPI: 96 ML/MIN/1.73 M 2
GLUCOSE SERPL-MCNC: 120 MG/DL (ref 65–99)
HCT VFR BLD AUTO: 37.1 % (ref 37–47)
HGB BLD-MCNC: 12.5 G/DL (ref 12–16)
MCH RBC QN AUTO: 29.9 PG (ref 27–33)
MCHC RBC AUTO-ENTMCNC: 33.7 G/DL (ref 32.2–35.5)
MCV RBC AUTO: 88.8 FL (ref 81.4–97.8)
PLATELET # BLD AUTO: 162 K/UL (ref 164–446)
PMV BLD AUTO: 10.5 FL (ref 9–12.9)
POTASSIUM SERPL-SCNC: 4.1 MMOL/L (ref 3.6–5.5)
RBC # BLD AUTO: 4.18 M/UL (ref 4.2–5.4)
SIGNIFICANT IND 70042: NORMAL
SIGNIFICANT IND 70042: NORMAL
SITE SITE: NORMAL
SITE SITE: NORMAL
SODIUM SERPL-SCNC: 134 MMOL/L (ref 135–145)
SOURCE SOURCE: NORMAL
SOURCE SOURCE: NORMAL
WBC # BLD AUTO: 19 K/UL (ref 4.8–10.8)

## 2024-03-28 PROCEDURE — 700105 HCHG RX REV CODE 258: Performed by: INTERNAL MEDICINE

## 2024-03-28 PROCEDURE — 92610 EVALUATE SWALLOWING FUNCTION: CPT

## 2024-03-28 PROCEDURE — 700111 HCHG RX REV CODE 636 W/ 250 OVERRIDE (IP): Mod: JZ

## 2024-03-28 PROCEDURE — 99233 SBSQ HOSP IP/OBS HIGH 50: CPT | Performed by: STUDENT IN AN ORGANIZED HEALTH CARE EDUCATION/TRAINING PROGRAM

## 2024-03-28 PROCEDURE — 700117 HCHG RX CONTRAST REV CODE 255: Performed by: INTERNAL MEDICINE

## 2024-03-28 PROCEDURE — A9270 NON-COVERED ITEM OR SERVICE: HCPCS | Performed by: STUDENT IN AN ORGANIZED HEALTH CARE EDUCATION/TRAINING PROGRAM

## 2024-03-28 PROCEDURE — 93005 ELECTROCARDIOGRAM TRACING: CPT

## 2024-03-28 PROCEDURE — 80048 BASIC METABOLIC PNL TOTAL CA: CPT

## 2024-03-28 PROCEDURE — 700105 HCHG RX REV CODE 258: Performed by: STUDENT IN AN ORGANIZED HEALTH CARE EDUCATION/TRAINING PROGRAM

## 2024-03-28 PROCEDURE — 700105 HCHG RX REV CODE 258

## 2024-03-28 PROCEDURE — 770020 HCHG ROOM/CARE - TELE (206)

## 2024-03-28 PROCEDURE — 4A10X4Z MONITORING OF CENTRAL NERVOUS ELECTRICAL ACTIVITY, EXTERNAL APPROACH: ICD-10-PCS | Performed by: PSYCHIATRY & NEUROLOGY

## 2024-03-28 PROCEDURE — 700102 HCHG RX REV CODE 250 W/ 637 OVERRIDE(OP): Performed by: STUDENT IN AN ORGANIZED HEALTH CARE EDUCATION/TRAINING PROGRAM

## 2024-03-28 PROCEDURE — A9579 GAD-BASE MR CONTRAST NOS,1ML: HCPCS | Performed by: INTERNAL MEDICINE

## 2024-03-28 PROCEDURE — 36415 COLL VENOUS BLD VENIPUNCTURE: CPT

## 2024-03-28 PROCEDURE — 85027 COMPLETE CBC AUTOMATED: CPT

## 2024-03-28 PROCEDURE — 85652 RBC SED RATE AUTOMATED: CPT

## 2024-03-28 PROCEDURE — 99231 SBSQ HOSP IP/OBS SF/LOW 25: CPT

## 2024-03-28 PROCEDURE — 95700 EEG CONT REC W/VID EEG TECH: CPT | Performed by: PSYCHIATRY & NEUROLOGY

## 2024-03-28 PROCEDURE — 95711 VEEG 2-12 HR UNMONITORED: CPT | Performed by: PSYCHIATRY & NEUROLOGY

## 2024-03-28 PROCEDURE — 70553 MRI BRAIN STEM W/O & W/DYE: CPT

## 2024-03-28 PROCEDURE — 95714 VEEG EA 12-26 HR UNMNTR: CPT | Performed by: PSYCHIATRY & NEUROLOGY

## 2024-03-28 PROCEDURE — 700111 HCHG RX REV CODE 636 W/ 250 OVERRIDE (IP): Performed by: STUDENT IN AN ORGANIZED HEALTH CARE EDUCATION/TRAINING PROGRAM

## 2024-03-28 PROCEDURE — 700111 HCHG RX REV CODE 636 W/ 250 OVERRIDE (IP): Performed by: INTERNAL MEDICINE

## 2024-03-28 PROCEDURE — 86140 C-REACTIVE PROTEIN: CPT

## 2024-03-28 PROCEDURE — 93010 ELECTROCARDIOGRAM REPORT: CPT | Performed by: INTERNAL MEDICINE

## 2024-03-28 PROCEDURE — 95718 EEG PHYS/QHP 2-12 HR W/VEEG: CPT | Performed by: PSYCHIATRY & NEUROLOGY

## 2024-03-28 PROCEDURE — 93005 ELECTROCARDIOGRAM TRACING: CPT | Performed by: STUDENT IN AN ORGANIZED HEALTH CARE EDUCATION/TRAINING PROGRAM

## 2024-03-28 RX ORDER — CALCIUM CARBONATE 500 MG/1
1000 TABLET, CHEWABLE ORAL 2 TIMES DAILY
Status: DISCONTINUED | OUTPATIENT
Start: 2024-03-28 | End: 2024-03-30

## 2024-03-28 RX ORDER — DIAZEPAM 5 MG/ML
5 INJECTION, SOLUTION INTRAMUSCULAR; INTRAVENOUS EVERY 4 HOURS PRN
Status: DISCONTINUED | OUTPATIENT
Start: 2024-03-28 | End: 2024-03-29

## 2024-03-28 RX ADMIN — SODIUM CHLORIDE: 9 INJECTION, SOLUTION INTRAVENOUS at 08:33

## 2024-03-28 RX ADMIN — AMPICILLIN SODIUM 2000 MG: 2 INJECTION, POWDER, FOR SOLUTION INTRAVENOUS at 05:33

## 2024-03-28 RX ADMIN — AMPICILLIN SODIUM 2000 MG: 2 INJECTION, POWDER, FOR SOLUTION INTRAVENOUS at 12:01

## 2024-03-28 RX ADMIN — ATORVASTATIN CALCIUM 20 MG: 20 TABLET, FILM COATED ORAL at 17:25

## 2024-03-28 RX ADMIN — GADOTERIDOL 15 ML: 279.3 INJECTION, SOLUTION INTRAVENOUS at 16:08

## 2024-03-28 RX ADMIN — CEFAZOLIN 2 G: 2 INJECTION, POWDER, FOR SOLUTION INTRAMUSCULAR; INTRAVENOUS at 18:00

## 2024-03-28 RX ADMIN — CALCIUM CHLORIDE 1000 MG: 100 INJECTION, SOLUTION INTRAVENOUS at 02:32

## 2024-03-28 RX ADMIN — GABAPENTIN 300 MG: 300 CAPSULE ORAL at 17:25

## 2024-03-28 ASSESSMENT — ENCOUNTER SYMPTOMS
NAUSEA: 0
HEADACHES: 0
MYALGIAS: 0
ABDOMINAL PAIN: 0
VOMITING: 0
DEPRESSION: 0
FALLS: 1
SHORTNESS OF BREATH: 0
FEVER: 0
CHILLS: 0
WEAKNESS: 1
SENSORY CHANGE: 1

## 2024-03-28 ASSESSMENT — PAIN DESCRIPTION - PAIN TYPE: TYPE: ACUTE PAIN

## 2024-03-28 NOTE — PROGRESS NOTES
Lab called with critical result of Calcium 6.8 at 0206. Critical lab result read back to lab.   Renetta Hart DNP notified of critical lab result at 0209.  Critical lab result read back by Renetta Hart DNP. Provider placing orders

## 2024-03-28 NOTE — PROCEDURES
Former[facility]    Continuous Video Electroencephalogram Report        Patient Name: Marry Mathur  MRN: [id number]  #: [id number]  Date of Service: 06:54 on 3/28/2024 to 14:38 on 03/28/24.  Total Recording Time: 7 hours and 44 minutes.  Referring Provider: Dash Parker M.D.    INDICATION:  Marry Mathur 88 y.o. female presenting with seizure(s).    CURRENT ANTI-SEIZURE AND OTHER PERTINENT MEDICATIONS:     Current Facility-Administered Medications:     [Held by provider] calcium carbonate    diazePAM    ceFAZolin    NS    haloperidol lactate    amLODIPine    atorvastatin    gabapentin    losartan    [Held by provider] rivaroxaban    acetaminophen    TECHNIQUE: CVEEG was set up by a Neurodiagnostic technologist who performed education to the patient and staff. A minimum of 23 electrodes and 23 channel recording was setup and performed by Neurodiagnostic technologist, in accordance with the international 10-20 system. Impedence, electrode integrity, and technical impressions were documented a minimum of every 2-24 hour period by a Neurodiagnostic Technologist and reviewed by Interpreting Physician. The study was reviewed in bipolar and referential montages. The recording examined the patient in the awake, drowsy, and sleep state(s).     DESCRIPTION OF THE RECORD:  EEG background: During maximal wakefulness, the background was continuous, asymmetrical, and showed a 7-8 Hz posterior dominant rhythm, with a mixture of theta, alpha, and rare delta activity.  Reactivity  was seen. State changes were seen.  During drowsiness, a loss of myogenic artifact  and theta/delta frequencies were seen.     Occasional N2 sleep transients in the form of rudimentary and/or ill-defined sleep spindles fragments and vertex waves were seen in the leads over the central regions.     ACTIVATION PROCEDURES:   Intermittent Photic stimulation was performed in a stepwise fashion from 1 to 30 Hz and did not elicited any  abnormalities on EEG.     ICTAL AND INTERICTAL FINDINGS:   No focal or generalized epileptiform activity noted.     There were some poorly formed, bilateral, frontally dominant, triphasic in morphology, waveforms.    There was additional, intermittent, left sided broad slowing.     No electroclinical or electrographic seizures were reported or recorded during the study.     EKG: Sampling of the EKG recording showed an abnormal rhythm.    EVENTS:  No clinical events recorded or reported.    INTERPRETATION:  This was an abnormal video EEG recording in the awake, drowsy, and sleep state(s):  Diffuse slowing of the background, suggestive of diffuse and/or multifocal cerebral dysfunction.  This finding might be seen in a myriad of encephalopathies and in itself is a nonspecific finding.  The presence of some poorly formed, bilateral, frontally dominant, triphasic in morphology, waveforms, might be suggestive of toxic-metabolic etiology of the above-noted encephalopathy, among other considerations.  Clinical and radiological correlate is recommended.  The presence of additional, intermittent, left-sided, broad slowing, is suggestive of additional cerebral dysfunction in that region.  This finding might be seen in context of structural lesion, neurodegenerative disorder, migraine headaches, and/or ictal onset zone, among other considerations.  Epileptiform discharges: No definitive epileptiform discharges or other epileptiform phenomena seen.   No definitive seizures.   Single lead EKG showed an abnormal heart rhythm - please correlate clinically.     Roman Stanley MD  Neurology Attending, Epilepsy Program  [facility]

## 2024-03-28 NOTE — EEG PROGRESS NOTE
BRIEF VIDEO EEG UPDATE NOTE    The patient is a an 88-year old woman who is being evaluated for seizures.    The first 5.5 hours were reviewed.    Diffuse slowing, with some additional, left sided slowing.    Triphasic waveforms noted, at times sharply contoured.    No definite electrographic seizures noted.    Overall stable compared to the prior study, though current study points more toward underlying encephalopathy.     Full report to follow in AM.    Roman Stanley MD  Neurology Attending, Epilepsy Program  [facility]

## 2024-03-28 NOTE — PROGRESS NOTES
Referring Physician: Dash Parker M.D.    Interval History: No acute events overnight. Continues to have waxing and waning of mental status. Son reports more alert this morning than yesterday. Working with SLP at bedside on evaluation with EEG connected.     S:    Scheduled Medications   Medication Dose Frequency    [Held by provider] calcium carbonate  1,000 mg BID    nitrofurantoin  100 mg BID WITH MEALS    ampicillin  2,000 mg Q6HRS    amLODIPine  2.5 mg Q EVENING    atorvastatin  20 mg Q EVENING    gabapentin  300 mg BID    losartan  50 mg DAILY    [Held by provider] rivaroxaban  20 mg PM MEAL          O:    Vitals:    03/28/24 1133   BP: 126/66   Pulse: 80   Resp: 18   Temp: 36.6 °C (97.9 °F)   SpO2: 96%      Mental status: Awake, alert oriented to self, place and time- not situation.   Language: Some delays with responses and some repetition. Speech appropriate, No dysarthria. Not conversant.   Cranial nerves:    Pupils are equal, round and reactive to light, no gaze preference  Intact muscles of mastication    Intact facial sensation   Face appears symmetric   Hearing is intact to conversation   Symmetric shoulder shrug   Symmetric elevation of the palate; uvula appears midline   Tongue protrusion is midline  Motor: Normal bulk and tone. Antigravity effort in Bilat upper and lower extremities with equal downward drift.  No abnormal movements or postures.   Sensory: Intact to light-touch and temperature.  Coordination: Unable to test, unable to follow complex commands  Gait: Deferred      Imaging:     CT head 3/26  1.  Hypoattenuating foci in the bilateral cerebellum which are indeterminant. They could be subacute infarcts versus other. Recommend brain MRI workup.  2.  Redemonstration of a small focus of low-attenuation in the left parietal deep and subcortical white matter. Possibly gliosis versus subacute infarct.  3.  Findings are stable since the noncontrast head CT performed one day prior.     CTA head  3/26  1.  Short segment decreased enhancement in the RIGHT superior sylvian division of middle superior artery, M3 segment, focal stenosis versus nonocclusive clot.  2.  No intracranial aneurysm or abrupt large vessel cut off.     CTA neck 3/26  No focal high-grade stenosis, dissection or occlusion of the cervical carotid or vertebral arteries.     EEG:     3/27: INTERPRETATION:  This was an abnormal video EEG recording in the awake, drowsy, and sleep state(s):  Diffuse slowing of the background, suggestive of diffuse and/or multifocal cerebral dysfunction.  This finding might be seen in a myriad of encephalopathies and in itself is a nonspecific finding.  The presence of additional, almost continuous, left temporal, focal slowing, is suggestive of additional cerebral dysfunction in that region.  This finding might be seen in a myriad of encephalopathies and in itself is a nonspecific finding.  There were runs of sharply contoured waveforms noted, at times occurring in the semi-rhythmic fashion, more prominent over the right, with uncertain clinical significance at this time (may be related to the underling encephalopathy), but might point toward increased propensity toward seizures.  If clinical suspicion for seizures persists, a prolonged EEG might be considered.  No clearly evolving electrographic seizures were captured.  Single-lead EKG showed irregular heart rhythm.      3/28   BRIEF VIDEO EEG UPDATE NOTE     The patient is a an 88-year old woman who is being evaluated for seizures.     The first 5.5 hours were reviewed.     Diffuse slowing, with some additional, left sided slowing.     Triphasic waveforms noted, at times sharply contoured.     No definite electrographic seizures noted.     Overall stable compared to the prior study, though current study points more toward underlying encephalopathy.       Impression & Recommendations:    Marry Mathur is an 89 yo woman found down at home presenting with acute  waxing and waning encephalopathy and lethargy without focal symptoms. Found to have an elevated procalcitonin, mild leukocytosis, UA positive for E. Coli and BCX positive. Started on ampicillin and macrobid initially, 3/28 changed to Cefazolin IV.  CT and CTA head/neck obtained during an episode of fluctuation revealed possible stenosis vs nonocclusive clot in R M3. Multiple hypoattenuating foci in the bilateral cerebellum, non-specific. Evaluated by Neurovascular Dr. Gay during episode of acute lethargy and possible L sided weakness. Due to non-focal findings on his exam, unknown LKW and low suspicion for stroke she was not a candidate for thrombolytic or endovascular intervention. cEEG ongoing, no signs of epileptiform morphology; non-specific triphasic waveforms, with diffuse slowing, L>R, most consistent with encephalopathy. May discontinue EEG today, low suspicion for seizures and no indications of seizure activity on cEEG thus far. Pending MRI to exclude underlying pathology to explain AMS and further evaluate multiple foci on CT head, however, this is likely an acute metabolic encephalopathy secondary to sepsis and advanced age.       Problems:  - UTI  - Bacteremia  - likely metabolic encephalopathy      Plan:  - Pending MRI - exclude underlying pathology   - DC cEEG - no indication of seizure on cEEG, suggestive of encephalopathy and low suspicion for seizures  - Treat sepsis per hospitalist  - May consider LP if worsening mental status, no improvement in clinical course or indications on MRI  - Sleep hygiene, avoid sedatives   - PT/OT/SLP eval and treat           TREVON Lehman  Neurohospitalist, Acute Care Services     The evaluation of the patient, and recommended management, was discussed with Dr. Contreras, the attending Acute Neurologist. I have performed a physical exam and reviewed and updated ROS and Plan today (3/28/2024).      Please note that this dictation was created using voice  recognition software.  I have made every reasonable attempt to correct obvious errors, but I expect that there are errors of grammar and possibly content that I did not discover before finalizing the note.

## 2024-03-28 NOTE — PROGRESS NOTES
Monitor summary: SR 80-94, NM -0.21, QRS -0.11, QT -0.43, with a first degree heart block, BBB and rare PVCs per strip from the monitor room.

## 2024-03-28 NOTE — PROGRESS NOTES
4 Eyes Skin Assessment Completed by MARCO Rodriguez and MARCO Quiles.    Head WDL  Ears Redness and Blanching  Nose Redness and Blanching  Mouth Dry, peeling   Neck Redness and Blanching  Breast/Chest WDL  Shoulder Blades WDL  Spine WDL  (R) Arm/Elbow/Hand Bruising and Discoloration  (L) Arm/Elbow/Hand Bruising and Discoloration  Abdomen Scar  Groin Redness and Blanching  Scrotum/Coccyx/Buttocks Redness and Blanching, Blisters   (R) Leg Scar, Shiny, and Edema  (L) Leg Swelling and Edema  (R) Heel/Foot/Toe Redness and Blanching  (L) Heel/Foot/Toe Redness and Blanching          Devices In Places Tele Box, Blood Pressure Cuff, and Pulse Ox      Interventions In Place Pillows and Pressure Redistribution Mattress    Possible Skin Injury Yes    Pictures Uploaded Into Epic Yes  Wound Consult Placed Yes  RN Wound Prevention Protocol Ordered Yes

## 2024-03-28 NOTE — THERAPY
Speech Language Pathology   Clinical Swallow Evaluation     Patient Name: Marry Mathur  AGE:  88 y.o., SEX:  female  Medical Record #: [id number]  Date of Service: 3/28/2024      History of Present Illness  88F admitted on 3/25/24 with AMS, found to have UTI. Code stroke called 3/26 r/t lethargy and L sided weakness. 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     Imaging:   3/25/24 CXR: No acute findings.     3/26/24 CT Head:   1.  Hypoattenuating foci in the bilateral cerebellum which are indeterminant. They could be subacute infarcts versus other. Recommend brain MRI workup.  2.  Redemonstration of a small focus of low-attenuation in the left parietal deep and subcortical white matter. Possibly gliosis versus subacute infarct.  3.  Findings are stable since the noncontrast head CT performed one day prior.    General Information:  Vitals  O2 Delivery Device: None - Room Air  Level of Consciousness: Lethargic  Patient Behaviors: Fatigue, Lethargic, Confused  Orientation: Oriented x 4 (fluctuates)  Follows Directives: Yes - simple commands only      Prior Living Situation & Level of Function:  Prior Services: Home-Independent  Comments: per EMR and pt, reports has help from a friend for IADLs, shopping/driving  Communication: WFL  Swallowing: WFL       Oral Mechanism Evaluation:  Dentition: Good, Natural dentition   Facial Symmetry: Equal  Facial Sensation: Equal     Labial Observations: WFL   Lingual Observations: Midline  Motor Speech: WFL          Laryngeal Function:  Secretion Management: Adequate  Voice Quality: WFL  Cough: Perceptually WNL       Subjective  RN cleared patient for evaluation. Patient's son, Nando, at bedside. Patient lives alone with her dog. Patient with fluctuating mentation, occasionally requiring cues to participate. Son reports no dysphagia/SLP hx, consumes regular diet at baseline, and no PNA hx.  Patient initially disoriented x4 but as session progressed, more participatory and oriented x4.       Assessment  Current Method of Nutrition: NPO until cleared by speech pathology  Positioning: Schofield's (60-90 degrees)  Bolus Administration: SLP    O2 Delivery Device: None - Room Air  Factor(s) Affecting Performance: Impaired mental status  Tracheostomy : No       Swallowing Trials:  Ice: WFL  Thin Liquid (TN0): WFL  Liquidised (LQ3): WFL      Comments: Adequate oral bolus acceptance; anterior loss of tsp TN0. Complete AP transfer without notable oral bolus residue upon oral inspection. Chewable solids held r/t current/fluctuating mentation. No cough/throat clear appreciated with PO, including consecutive TN0 straw sips x3. Vocal quality remained stable throughout PO intake. Single swallow completed per bolus. No signs of esophageal dysfunction. 1:1 feeding assist provided r/t UE weakness/incoordination. Discussed diet initiation with patient/son with plans to advance solids as mentation improves/stabilizes. Provided education regarding general aspiration precautions as well as signs and symptoms of aspiration. RN updated.       Clinical Impressions  Patient presents with clinically functional oropharyngeal swallow at this time; however, current mentation elevates risk for pharyngeal dysphagia. Recommend modified diet initiation with 1:1 feeding assist. Service will follow up to ensure diet tolerance and advance diet as appropriate.     Recommendations  Diet Consistency: Full/thin liquids  Instrumentation: Instrumental swallow study pending clinical progress  Medication: Crush with applesauce, as appropriate  Supervision: Careful 1:1 hand feeding  Positioning: Fully upright and midline during oral intake  Risk Management : Small bites/sips, Slow rate of intake  Oral Care: Q6h         SLP Treatment Plan  Treatment Plan: Dysphagia Treatment  SLP Frequency: 3x Per Week  Estimated Duration: Until Therapy Goals  "Met      Anticipated Discharge Needs  Discharge Recommendations: Recommend post-acute placement for additional speech therapy services prior to discharge home   Therapy Recommendations Upon DC: Dysphagia Training        Patient / Family Goals  Patient / Family Goal #1: \"I like the ice\"  Short Term Goals  Short Term Goal # 1: Patient will consume a full/thin liquid diet without overt indicators of airway invasion or decline in pulmonary status.      Iris Gonzalez, SLP   "

## 2024-03-28 NOTE — EEG PROGRESS NOTE
Pt hooked up for LTM VEEG with CT compatible leads and MRI conditional leads.    - These leads can go to CT.  - These leads can go to MRI.

## 2024-03-28 NOTE — CARE PLAN
Informed pt that she can stop progesterone per . Pt states she has been having headache incl intermittent migraines x 1 months. Pt drinkgs 2L of water per day. Advised pt to come in for BP check but pt states she works in [employer].  Advised pt t The patient is Watcher - Medium risk of patient condition declining or worsening    Shift Goals  Clinical Goals: no worsening neruo status, EEG & MRI needed  Patient Goals: rest  Family Goals: EEG and MRI completion    Progress made toward(s) clinical / shift goals:  no changes in neuro status throughout night, EEG to be started this AM, MRI still pending. No c/o pain. TAPs system & heel boots applied.    Problem: Pain - Standard  Goal: Alleviation of pain or a reduction in pain to the patient’s comfort goal  3/28/2024 0328 by Reese Maza, R.N.  Outcome: Progressing  3/28/2024 0320 by Reese Maza, R.N.  Outcome: Progressing     Problem: Skin Integrity  Goal: Skin integrity is maintained or improved  3/28/2024 0328 by Reese Maza, R.N.  Outcome: Progressing  3/28/2024 0320 by Reese Maza, R.N.  Outcome: Progressing       Patient is not progressing towards the following goals:

## 2024-03-28 NOTE — PROGRESS NOTES
Lab called with critical result of 2nd set of cultures positive at 0520. Critical lab result read back to lab   Renetta Hart DNP notified of critical lab result at 0526.  Critical lab result read back by Renetta Hart DNP.

## 2024-03-29 ENCOUNTER — APPOINTMENT (OUTPATIENT)
Dept: RADIOLOGY | Facility: MEDICAL CENTER | Age: 89
DRG: 288 | End: 2024-03-29
Attending: STUDENT IN AN ORGANIZED HEALTH CARE EDUCATION/TRAINING PROGRAM
Payer: MEDICARE

## 2024-03-29 ENCOUNTER — APPOINTMENT (OUTPATIENT)
Dept: CARDIOLOGY | Facility: MEDICAL CENTER | Age: 89
DRG: 288 | End: 2024-03-29
Payer: MEDICARE

## 2024-03-29 PROBLEM — I63.9 STROKE (CEREBRUM) (HCC): Status: ACTIVE | Noted: 2024-03-29

## 2024-03-29 PROBLEM — I38 ENDOCARDITIS: Status: ACTIVE | Noted: 2024-03-29

## 2024-03-29 LAB
ALBUMIN SERPL BCP-MCNC: 2.8 G/DL (ref 3.2–4.9)
ALBUMIN/GLOB SERPL: 1 G/DL
ALP SERPL-CCNC: 69 U/L (ref 30–99)
ALT SERPL-CCNC: 34 U/L (ref 2–50)
ANION GAP SERPL CALC-SCNC: 13 MMOL/L (ref 7–16)
AST SERPL-CCNC: 33 U/L (ref 12–45)
BILIRUB SERPL-MCNC: 0.5 MG/DL (ref 0.1–1.5)
BUN SERPL-MCNC: 13 MG/DL (ref 8–22)
CALCIUM ALBUM COR SERPL-MCNC: 8.1 MG/DL (ref 8.5–10.5)
CALCIUM SERPL-MCNC: 7.1 MG/DL (ref 8.5–10.5)
CHLORIDE SERPL-SCNC: 103 MMOL/L (ref 96–112)
CHOLEST SERPL-MCNC: 91 MG/DL (ref 100–199)
CO2 SERPL-SCNC: 17 MMOL/L (ref 20–33)
CREAT SERPL-MCNC: 0.51 MG/DL (ref 0.5–1.4)
EKG IMPRESSION: NORMAL
ERYTHROCYTE [DISTWIDTH] IN BLOOD BY AUTOMATED COUNT: 46.5 FL (ref 35.9–50)
EST. AVERAGE GLUCOSE BLD GHB EST-MCNC: 126 MG/DL
GFR SERPLBLD CREATININE-BSD FMLA CKD-EPI: 90 ML/MIN/1.73 M 2
GLOBULIN SER CALC-MCNC: 2.9 G/DL (ref 1.9–3.5)
GLUCOSE SERPL-MCNC: 111 MG/DL (ref 65–99)
HBA1C MFR BLD: 6 % (ref 4–5.6)
HCT VFR BLD AUTO: 34.6 % (ref 37–47)
HDLC SERPL-MCNC: 31 MG/DL
HGB BLD-MCNC: 11.4 G/DL (ref 12–16)
LDLC SERPL CALC-MCNC: 48 MG/DL
MAGNESIUM SERPL-MCNC: 1.8 MG/DL (ref 1.5–2.5)
MCH RBC QN AUTO: 28.8 PG (ref 27–33)
MCHC RBC AUTO-ENTMCNC: 32.9 G/DL (ref 32.2–35.5)
MCV RBC AUTO: 87.4 FL (ref 81.4–97.8)
PHOSPHATE SERPL-MCNC: 2 MG/DL (ref 2.5–4.5)
PLATELET # BLD AUTO: 173 K/UL (ref 164–446)
PMV BLD AUTO: 10.5 FL (ref 9–12.9)
POTASSIUM SERPL-SCNC: 3.8 MMOL/L (ref 3.6–5.5)
PROT SERPL-MCNC: 5.7 G/DL (ref 6–8.2)
RBC # BLD AUTO: 3.96 M/UL (ref 4.2–5.4)
SODIUM SERPL-SCNC: 133 MMOL/L (ref 135–145)
TRIGL SERPL-MCNC: 61 MG/DL (ref 0–149)
WBC # BLD AUTO: 18.6 K/UL (ref 4.8–10.8)

## 2024-03-29 PROCEDURE — 36415 COLL VENOUS BLD VENIPUNCTURE: CPT

## 2024-03-29 PROCEDURE — 93010 ELECTROCARDIOGRAM REPORT: CPT | Mod: 59 | Performed by: STUDENT IN AN ORGANIZED HEALTH CARE EDUCATION/TRAINING PROGRAM

## 2024-03-29 PROCEDURE — 700102 HCHG RX REV CODE 250 W/ 637 OVERRIDE(OP): Performed by: STUDENT IN AN ORGANIZED HEALTH CARE EDUCATION/TRAINING PROGRAM

## 2024-03-29 PROCEDURE — 80061 LIPID PANEL: CPT

## 2024-03-29 PROCEDURE — 770020 HCHG ROOM/CARE - TELE (206)

## 2024-03-29 PROCEDURE — 80053 COMPREHEN METABOLIC PANEL: CPT

## 2024-03-29 PROCEDURE — 93306 TTE W/DOPPLER COMPLETE: CPT

## 2024-03-29 PROCEDURE — 83036 HEMOGLOBIN GLYCOSYLATED A1C: CPT

## 2024-03-29 PROCEDURE — 83735 ASSAY OF MAGNESIUM: CPT

## 2024-03-29 PROCEDURE — 97535 SELF CARE MNGMENT TRAINING: CPT

## 2024-03-29 PROCEDURE — 99223 1ST HOSP IP/OBS HIGH 75: CPT | Performed by: INTERNAL MEDICINE

## 2024-03-29 PROCEDURE — 84100 ASSAY OF PHOSPHORUS: CPT

## 2024-03-29 PROCEDURE — 99233 SBSQ HOSP IP/OBS HIGH 50: CPT | Performed by: STUDENT IN AN ORGANIZED HEALTH CARE EDUCATION/TRAINING PROGRAM

## 2024-03-29 PROCEDURE — A9270 NON-COVERED ITEM OR SERVICE: HCPCS | Performed by: STUDENT IN AN ORGANIZED HEALTH CARE EDUCATION/TRAINING PROGRAM

## 2024-03-29 PROCEDURE — 700111 HCHG RX REV CODE 636 W/ 250 OVERRIDE (IP): Performed by: INTERNAL MEDICINE

## 2024-03-29 PROCEDURE — 70450 CT HEAD/BRAIN W/O DYE: CPT

## 2024-03-29 PROCEDURE — 92526 ORAL FUNCTION THERAPY: CPT

## 2024-03-29 PROCEDURE — 97163 PT EVAL HIGH COMPLEX 45 MIN: CPT

## 2024-03-29 PROCEDURE — 700101 HCHG RX REV CODE 250: Performed by: INTERNAL MEDICINE

## 2024-03-29 PROCEDURE — 99232 SBSQ HOSP IP/OBS MODERATE 35: CPT

## 2024-03-29 PROCEDURE — 99223 1ST HOSP IP/OBS HIGH 75: CPT | Mod: FS | Performed by: INTERNAL MEDICINE

## 2024-03-29 PROCEDURE — 85027 COMPLETE CBC AUTOMATED: CPT

## 2024-03-29 PROCEDURE — 700111 HCHG RX REV CODE 636 W/ 250 OVERRIDE (IP): Performed by: STUDENT IN AN ORGANIZED HEALTH CARE EDUCATION/TRAINING PROGRAM

## 2024-03-29 PROCEDURE — 700105 HCHG RX REV CODE 258: Performed by: STUDENT IN AN ORGANIZED HEALTH CARE EDUCATION/TRAINING PROGRAM

## 2024-03-29 PROCEDURE — 93306 TTE W/DOPPLER COMPLETE: CPT | Mod: 26 | Performed by: STUDENT IN AN ORGANIZED HEALTH CARE EDUCATION/TRAINING PROGRAM

## 2024-03-29 RX ADMIN — GABAPENTIN 300 MG: 300 CAPSULE ORAL at 04:48

## 2024-03-29 RX ADMIN — CEFAZOLIN 2 G: 2 INJECTION, POWDER, FOR SOLUTION INTRAMUSCULAR; INTRAVENOUS at 04:53

## 2024-03-29 RX ADMIN — LOSARTAN POTASSIUM 50 MG: 50 TABLET, FILM COATED ORAL at 04:48

## 2024-03-29 RX ADMIN — CEFTRIAXONE SODIUM 2000 MG: 10 INJECTION, POWDER, FOR SOLUTION INTRAVENOUS at 16:37

## 2024-03-29 RX ADMIN — ATORVASTATIN CALCIUM 20 MG: 20 TABLET, FILM COATED ORAL at 16:37

## 2024-03-29 ASSESSMENT — ENCOUNTER SYMPTOMS
CHILLS: 0
VOMITING: 0
HEADACHES: 0
DEPRESSION: 0
ABDOMINAL PAIN: 0
NAUSEA: 0
MYALGIAS: 0
FEVER: 0
FALLS: 1
SENSORY CHANGE: 1
WEAKNESS: 1
SHORTNESS OF BREATH: 0

## 2024-03-29 ASSESSMENT — COGNITIVE AND FUNCTIONAL STATUS - GENERAL
MOBILITY SCORE: 6
HELP NEEDED FOR BATHING: A LOT
DAILY ACTIVITIY SCORE: 9
STANDING UP FROM CHAIR USING ARMS: TOTAL
TURNING FROM BACK TO SIDE WHILE IN FLAT BAD: A LOT
TOILETING: TOTAL
MOVING TO AND FROM BED TO CHAIR: TOTAL
MOVING TO AND FROM BED TO CHAIR: TOTAL
MOVING FROM LYING ON BACK TO SITTING ON SIDE OF FLAT BED: TOTAL
WALKING IN HOSPITAL ROOM: TOTAL
PERSONAL GROOMING: A LOT
MOVING FROM LYING ON BACK TO SITTING ON SIDE OF FLAT BED: A LOT
DRESSING REGULAR LOWER BODY CLOTHING: TOTAL
TURNING FROM BACK TO SIDE WHILE IN FLAT BAD: TOTAL
SUGGESTED CMS G CODE MODIFIER MOBILITY: CM
SUGGESTED CMS G CODE MODIFIER DAILY ACTIVITY: CL
MOBILITY SCORE: 8
WALKING IN HOSPITAL ROOM: TOTAL
DRESSING REGULAR UPPER BODY CLOTHING: TOTAL
CLIMB 3 TO 5 STEPS WITH RAILING: TOTAL
STANDING UP FROM CHAIR USING ARMS: TOTAL
EATING MEALS: A LOT
CLIMB 3 TO 5 STEPS WITH RAILING: TOTAL
SUGGESTED CMS G CODE MODIFIER MOBILITY: CN

## 2024-03-29 ASSESSMENT — PAIN DESCRIPTION - PAIN TYPE: TYPE: ACUTE PAIN

## 2024-03-29 ASSESSMENT — FIBROSIS 4 INDEX: FIB4 SCORE: 2.85

## 2024-03-29 ASSESSMENT — GAIT ASSESSMENTS: GAIT LEVEL OF ASSIST: UNABLE TO PARTICIPATE

## 2024-03-29 NOTE — PROGRESS NOTES
Monitor summary: SR/2nd degree type I, HR 53-85, DC 0.20, QRS 0.09, QT 0.40 with (R)PAC and HB dropped down to 47 bpms when going in and out of 2nd degree type I HB per strip from monitor room

## 2024-03-29 NOTE — PROGRESS NOTES
Hospital Medicine Daily Progress Note    Date of Service  3/28/2024    Chief Complaint  Marry Mathur is a 88 y.o. female admitted 3/25/2024 with altered mental status    Hospital Course  88 y.o. female who presented 3/25/2024 with altered mental status.  History limited as patient is confused at bedside.  Patient reports that she went to the restroom and caught her foot on an object, and had been lying down on the ground for a few minutes.  She was too weak to get up.  Per EMS, neighbors checked on patient and found her on the ground, unclear of how long she was actually down on the ground.  She was then brought to ER for evaluation.     In ER, patient found to have normal vital signs.  Pertinent labs include prerenal azotemia, .  UA showing many bacteria, elevated white blood cell count/nitrite.  CT head showing suspected age-indeterminate infarcts.  Chest x-ray showed no acute findings.    Interval Problem Update  No acute events overnight.  Preliminary EEG results with no seizure activity, cEEG discontinued. Final report in AM.  Discussed with neurology, their review of MRI concerning for multiple embolic strokes concerning for possible infectious source given positive blood cultures.  TTE, ESR, CRP ordered.  MRI report pending.  Holding anticoagulation and antiplatelets for now to reduce risk of hemorrhagic conversion.  Repeat blood cultures 3/27 NGTD.  Blood cultures 3/25 growing strep species and strep viridans.  Urine culture with E coli.  Discussed with pharmacy, stopping ampicillin and macrobid and starting ancef.  WBC remains elevated, increased to 19k today.  Telemetry notified possible 2nd degree heart block, EKG reviewed showing sinus rhythm, 1st degree heart block. Continue telemetry monitoring.  Updates given to son and family at bedside.  Ultimately patient will need SNF.      I have discussed this patient's plan of care and discharge plan at IDT rounds today with Case Management,  Nursing, Nursing leadership, and other members of the IDT team.    Consultants/Specialty  General Neurology   Vascular Neurology    Code Status  Full Code    Disposition  The patient is not medically cleared for discharge to home or a post-acute facility.  Anticipate discharge to: skilled nursing facility    I have placed the appropriate orders for post-discharge needs.    Review of Systems  Review of Systems   Constitutional:  Positive for malaise/fatigue. Negative for chills and fever.   Respiratory:  Negative for shortness of breath.    Cardiovascular:  Negative for chest pain.   Gastrointestinal:  Negative for abdominal pain, nausea and vomiting.   Musculoskeletal:  Positive for falls. Negative for myalgias.   Skin:  Negative for rash.   Neurological:  Positive for sensory change and weakness. Negative for headaches.   Psychiatric/Behavioral:  Negative for depression.    All other systems reviewed and are negative.       Physical Exam  Temp:  [36.2 °C (97.1 °F)-36.6 °C (97.9 °F)] 36.3 °C (97.3 °F)  Pulse:  [75-94] 75  Resp:  [18-26] 18  BP: (118-131)/(61-86) 118/61  SpO2:  [95 %-97 %] 97 %    Physical Exam  Vitals and nursing note reviewed.   Constitutional:       General: She is not in acute distress.     Appearance: She is ill-appearing (chronically).      Comments: Son at bedside    HENT:      Head: Normocephalic and atraumatic.      Mouth/Throat:      Pharynx: Oropharynx is clear.   Eyes:      Conjunctiva/sclera: Conjunctivae normal.   Cardiovascular:      Rate and Rhythm: Normal rate and regular rhythm.      Pulses: Normal pulses.   Pulmonary:      Effort: Pulmonary effort is normal. No respiratory distress.      Breath sounds: Normal breath sounds. No wheezing.   Abdominal:      General: Abdomen is flat. There is no distension.      Palpations: Abdomen is soft.      Tenderness: There is no abdominal tenderness.   Musculoskeletal:         General: Normal range of motion.      Cervical back: Neck supple.       Right lower leg: No edema.      Left lower leg: No edema.   Skin:     General: Skin is warm and dry.   Neurological:      General: No focal deficit present.      Mental Status: She is alert.      Cranial Nerves: No cranial nerve deficit.      Motor: Weakness present.      Comments: A&Ox3 this morning   UE 5/5 strength  LE 2/5 right worse    Psychiatric:         Mood and Affect: Mood is anxious.         Fluids    Intake/Output Summary (Last 24 hours) at 3/28/2024 1939  Last data filed at 3/28/2024 0400  Gross per 24 hour   Intake --   Output 450 ml   Net -450 ml         Laboratory  Recent Labs     03/26/24  0415 03/27/24  0409 03/28/24  0103   WBC 10.0 16.3* 19.0*   RBC 3.99* 4.07* 4.18*   HEMOGLOBIN 11.7* 12.0 12.5   HEMATOCRIT 35.4* 35.2* 37.1   MCV 88.7 86.5 88.8   MCH 29.3 29.5 29.9   MCHC 33.1 34.1 33.7   RDW 47.8 46.6 47.9   PLATELETCT 172 187 162*   MPV 10.1 9.8 10.5     Recent Labs     03/26/24  0415 03/27/24  0409 03/28/24  0103   SODIUM 135 136 134*   POTASSIUM 3.9 3.5* 4.1   CHLORIDE 108 106 104   CO2 17* 17* 17*   GLUCOSE 115* 147* 120*   BUN 15 16 12   CREATININE 0.51 0.60 0.39*   CALCIUM 7.3* 7.2* 6.8*                   Imaging  CT-CTA NECK WITH & W/O-POST PROCESSING   Final Result      No focal high-grade stenosis, dissection or occlusion of the cervical carotid or vertebral arteries.      CT-CTA HEAD WITH & W/O-POST PROCESS   Final Result      1.  Short segment decreased enhancement in the RIGHT superior sylvian division of middle superior artery, M3 segment, focal stenosis versus nonocclusive clot.   2.  No intracranial aneurysm or abrupt large vessel cut off.      CT-HEAD W/O   Final Result      1.  Hypoattenuating foci in the bilateral cerebellum which are indeterminant. They could be subacute infarcts versus other. Recommend brain MRI workup.   2.  Redemonstration of a small focus of low-attenuation in the left parietal deep and subcortical white matter. Possibly gliosis versus subacute infarct.    3.  Findings are stable since the noncontrast head CT performed one day prior.         CT-HEAD W/O   Final Result      1.  Small, rounded hypodense areas of the bilateral cerebellar hemispheres. These may represent age-indeterminate infarcts. Less likely this could represent edema related to metastatic disease.   2.  There is a small area of loss of gray-white differentiation in the left parietal lobe, possibly an age-indeterminate infarct.   3.  These findings can be further evaluated with contrast-enhanced MRI as indicated.   4.  Cerebral atrophy.   5.  White matter lucencies most consistent with      DX-CHEST-PORTABLE (1 VIEW)   Final Result      No acute findings.      MR-BRAIN-WITH & W/O    (Results Pending)   EC-ECHOCARDIOGRAM COMPLETE W/O CONT    (Results Pending)        Assessment/Plan  * Acute metabolic encephalopathy- (present on admission)  Assessment & Plan  Patient presents with confusion.  UA pertinent for UTI, likely culprit for encephalopathy.  CT head showing no acute changes  Antibiotics for UTI   S/p fluids  On 3/26 code stroke was activated, per vascular neurology they think stroke unlikely   -CTA did have some narrowing, recommended to keep blood pressure above 120  MRI brain with and without pending  EEG showed abnormal changes  General neurology consulted, appreciate recommendations    Acute cystitis without hematuria  Assessment & Plan  Continue ancef  Urine culture with E coli    Positive blood culture  Assessment & Plan  Blood cultures 3/25 with strep species  Repeat blood cultures 3/27 taken  Urine culture positive for E coli    S/P TAVR (transcatheter aortic valve replacement)- (present on admission)  Assessment & Plan  Performed in September 2023    Protein S deficiency (HCC)- (present on admission)  Assessment & Plan  Holding home xarelto    History of recurrent deep vein thrombosis (DVT)- (present on admission)  Assessment & Plan  On Xarelto at home  Hold AC for now given  strokes    ACP (advance care planning)  Assessment & Plan  16 minutes spent discussing goals of care with patient's son (Nando [phone]) via telephone.  He was explained about patient's current clinical condition and treatment plan.  He states that patient was confused when he was on the phone with her this morning, and that she did not recognize who he was.  He spoke with her in the evening yesterday and patient appeared to be at baseline.  He was asked about CODE STATUS, as he is power of , he states that he would like patient to be full code and to have everything done, including chest compressions and intubation.  He has a flight here tomorrow afternoon    Other hyperlipidemia- (present on admission)  Assessment & Plan  Lipitor    Hypertension  Assessment & Plan  Restart         VTE prophylaxis: SCDs    My total time spent caring for the patient on the day of the encounter was 49 minutes. This time includes reviewing the hospital chart vitals, lab tests, and imaging; counseling and educating the patient about their diagnoses; coordinating care with the nurse, pharmacist, and specialists; documenting clinical information in the electronic medical record.  This does not include time spent on separately billable procedures/tests.

## 2024-03-29 NOTE — ASSESSMENT & PLAN NOTE
MRI brain confirms multiple foci of acute infarction bilaterally concerning for cardioembolic strokes  Possible septic emboli from endocarditis  Patient with hx of protein S deficiency and DVT, on anticoagulation at home, currently AC is held  Neurology following  ID and cardiology consulted

## 2024-03-29 NOTE — PROGRESS NOTES
Referring Physician: Dash Parker M.D.      Interval History: Continues to have fluctuating mentation. MRI completed yesterday demonstrated multiple foci of acute infarction scattered throughout bilateral hemispheres, cerebellum and brainstem, largest cluster in right frontoparietal region. Cardioembolic workup started. Echo completed, shows mobile mass noted on thickened mitral valve (posterior leaflet).    Review of systems: In addition to what is detailed in the HPI and/or updated in the interval history, all other systems reviewed and are negative.    Past Medical History, Past Surgical History and Social History reviewed and unchanged from prior    Medications:    Current Facility-Administered Medications:     [Held by provider] calcium carbonate (Tums) chewable tab 1,000 mg, 1,000 mg, Oral, BID, Letty Hart D.N.P.    diazePAM (Valium) injection 5 mg, 5 mg, Intravenous, Q4HRS PRN, Dash Parker M.D.    ceFAZolin (Ancef) 2 g in  mL IVPB, 2 g, Intravenous, Q8HRS, Dash Parker M.D., Stopped at 03/29/24 0523    NS infusion, , Intravenous, Continuous, Patricia Youngblood D.O., Last Rate: 83 mL/hr at 03/28/24 0833, New Bag at 03/28/24 0833    haloperidol lactate (Haldol) injection 1 mg, 1 mg, Intravenous, Once PRN, Patricia Youngblood D.O.    amLODIPine (Norvasc) tablet 2.5 mg, 2.5 mg, Oral, Q EVENING, Selwyn Waters M.D., 2.5 mg at 03/27/24 1747    atorvastatin (Lipitor) tablet 20 mg, 20 mg, Oral, Q EVENING, Selwyn Waters M.D., 20 mg at 03/28/24 1725    gabapentin (Neurontin) capsule 300 mg, 300 mg, Oral, BID, Selwyn Waters M.D., 300 mg at 03/29/24 0448    losartan (Cozaar) tablet 50 mg, 50 mg, Oral, DAILY, Selwyn Waters M.D., 50 mg at 03/29/24 0448    [Held by provider] rivaroxaban (Xarelto) tablet 20 mg, 20 mg, Oral, PM MEAL, Selwyn Waters M.D., 20 mg at 03/27/24 1748    acetaminophen (Tylenol) tablet 650 mg, 650 mg, Oral, Q6HRS PRN, Selwyn Waters M.D.    Physical  "Examination:   BP (!) 145/78 Comment: RN notified  Pulse 89   Temp 37 °C (98.6 °F) (Temporal)   Resp 20   Ht 1.575 m (5' 2\")   Wt 74 kg (163 lb 2.3 oz)   LMP  (LMP Unknown)   SpO2 94%   BMI 29.84 kg/m²       General: sleeping, flushed  Neck: There is normal range of motion  CV: Regular rate   Extremities:  extremities feel dry and hot    NEUROLOGICAL EXAM:     Mental status: Woken from sleep, drowsy, answers questions with eyes closed. Oriented to self and year  Speech and language: Speech is clear and fluent, non-conversational  Cranial nerve exam:    CN II-III: PERRLA   CN II: Visual Fields Intact   CN III, IV, VI: EOMI w/o Nystagmus   CN V: Facial sensation intact, full strength with mastication   CN VII: Symmetrical facial movement   CN VIII: Hearing grossly normal     Motor exam: There is sustained antigravity with no downward drift in bilateral arms and legs.  Normal tone/bulk. No tremors or pronator drift   RUE 3/5   LUE 2/5   RLL 3/5   LLL 2/5     Reflexes:Toes down going bilaterally     Sensory exam:  Reacts to tactile in all 4 extremities, there is no neglect to double stim  Coordination: No ataxia on bilateral FTN testing        Objective Data:    Labs:  Lab Results   Component Value Date/Time    PROTHROMBTM 24.8 (H) 10/09/2023 11:25 AM    INR 1.30 (A) 12/15/2023 02:58 PM      Lab Results   Component Value Date/Time    WBC 18.6 (H) 03/29/2024 12:35 AM    RBC 3.96 (L) 03/29/2024 12:35 AM    HEMOGLOBIN 11.4 (L) 03/29/2024 12:35 AM    HEMATOCRIT 34.6 (L) 03/29/2024 12:35 AM    MCV 87.4 03/29/2024 12:35 AM    MCH 28.8 03/29/2024 12:35 AM    MCHC 32.9 03/29/2024 12:35 AM    MPV 10.5 03/29/2024 12:35 AM    NEUTSPOLYS 75.90 (H) 03/26/2024 04:15 AM    LYMPHOCYTES 11.40 (L) 03/26/2024 04:15 AM    MONOCYTES 10.00 03/26/2024 04:15 AM    EOSINOPHILS 1.90 03/26/2024 04:15 AM    BASOPHILS 0.50 03/26/2024 04:15 AM      Lab Results   Component Value Date/Time    SODIUM 133 (L) 03/29/2024 12:35 AM    POTASSIUM 3.8 " 03/29/2024 12:35 AM    CHLORIDE 103 03/29/2024 12:35 AM    CO2 17 (L) 03/29/2024 12:35 AM    GLUCOSE 111 (H) 03/29/2024 12:35 AM    BUN 13 03/29/2024 12:35 AM    CREATININE 0.51 03/29/2024 12:35 AM      Lab Results   Component Value Date/Time    CHOLSTRLTOT 91 (L) 03/29/2024 12:35 AM    LDL 48 03/29/2024 12:35 AM    HDL 31 (A) 03/29/2024 12:35 AM    TRIGLYCERIDE 61 03/29/2024 12:35 AM       Lab Results   Component Value Date/Time    ALKPHOSPHAT 69 03/29/2024 12:35 AM    ASTSGOT 33 03/29/2024 12:35 AM    ALTSGPT 34 03/29/2024 12:35 AM    TBILIRUBIN 0.5 03/29/2024 12:35 AM        Imaging/Testing:    I interpreted and/or reviewed the patient's neuroimaging    MR-BRAIN-WITH & W/O   Final Result         Multiple foci of acute infarction scattered throughout the bilateral cerebral hemispheres, cerebellum and brainstem as described above, suggesting a proximal cardiovascular embolic source.      The largest cluster of acute infarctions is noted in the right frontoparietal region involving the right MCA territory.      Age-related volume loss.      CT-CTA NECK WITH & W/O-POST PROCESSING   Final Result      No focal high-grade stenosis, dissection or occlusion of the cervical carotid or vertebral arteries.      CT-CTA HEAD WITH & W/O-POST PROCESS   Final Result      1.  Short segment decreased enhancement in the RIGHT superior sylvian division of middle superior artery, M3 segment, focal stenosis versus nonocclusive clot.   2.  No intracranial aneurysm or abrupt large vessel cut off.      CT-HEAD W/O   Final Result      1.  Hypoattenuating foci in the bilateral cerebellum which are indeterminant. They could be subacute infarcts versus other. Recommend brain MRI workup.   2.  Redemonstration of a small focus of low-attenuation in the left parietal deep and subcortical white matter. Possibly gliosis versus subacute infarct.   3.  Findings are stable since the noncontrast head CT performed one day prior.         CT-HEAD W/O    Final Result      1.  Small, rounded hypodense areas of the bilateral cerebellar hemispheres. These may represent age-indeterminate infarcts. Less likely this could represent edema related to metastatic disease.   2.  There is a small area of loss of gray-white differentiation in the left parietal lobe, possibly an age-indeterminate infarct.   3.  These findings can be further evaluated with contrast-enhanced MRI as indicated.   4.  Cerebral atrophy.   5.  White matter lucencies most consistent with      DX-CHEST-PORTABLE (1 VIEW)   Final Result      No acute findings.      EC-ECHOCARDIOGRAM COMPLETE W/O CONT    (Results Pending)       Impression & Recommendations:      Marry Mathur is an 89 yo woman found down at home presenting with acute waxing and waning encephalopathy and lethargy without focal symptoms. Found to have an elevated procalcitonin, mild leukocytosis, UA positive for E. Coli and BCX positive. Started on ampicillin and macrobid initially, 3/28 changed to Cefazolin IV. CT and CTA head/neck obtained during an episode of fluctuation revealed possible stenosis vs nonocclusive clot in R M3. Multiple hypoattenuating foci in the bilateral cerebellum, non-specific. Evaluated by Neurovascular Dr. Gay during episode of acute lethargy and possible L sided weakness. Due to non-focal findings on his exam, unknown LKW and low suspicion for stroke she was not a candidate for thrombolytic or endovascular intervention. cEEG completed, no signs of epileptiform morphology; non-specific triphasic waveforms, with diffuse slowing, L>R, most consistent with encephalopathy. MRI completed yesterday demonstrated multiple foci of acute infarction scattered throughout bilateral hemispheres, cerebellum and brainstem, largest cluster in right frontoparietal region. Cardioembolic workup started. She has risk factors for both thrombotic and septic emboli. Lorento currently held d/t risk for hemorrhagic conversion if  septic emboli. Echo completed, shows mobile mass noted on thickened mitral valve (posterior leaflet). Confirmed with Dr Zhong that there was some visualization of mass on previous study, it is more prominent now and could still indicate vegetation. Suggest correlation with VINAY and ID consult.              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, cerebellum and brainstem   - Largest infarct burden R frontoparietal         Plan:   - Suggest Consult ID   - Echo concerning for vegetation- mobile mass on Mitral leaflet  - VINAY- more accurate visualization of leaflets and likely vegetaion  - Hold Xeralto due to high suspicion for septic emboli with possible mitral vegetation  - Hold Xeralto until cleared by ID   - Maintain normotension, 100-140/60-80    - Target LDL < 70, LDL 48  - Target A1C <7, A1C 6.0  - Maintain normoglycemia, BG   - Sleep hygiene, avoid sedatives   - DVT ppx: SCD  - PT/OT/SLP eval and treat   - Follow up with [facility] outpatient      TREVON Lehman  Neurohospitalist, Acute Care Services     The evaluation of the patient, and recommended management, was discussed with Dr. Contreras, the attending Acute Neurologist. I have performed a physical exam and reviewed and updated ROS and Plan today (3/29/2024).      Please note that this dictation was created using voice recognition software.  I have made every reasonable attempt to correct obvious errors, but I expect that there are errors of grammar and possibly content that I did not discover before finalizing the note.

## 2024-03-29 NOTE — CARE PLAN
The patient is Watcher - Medium risk of patient condition declining or worsening    Shift Goals  Clinical Goals: neuro exam  Patient Goals: rest  Family Goals: wilmer    Progress made toward(s) clinical / shift goals:    Problem: Fall Risk  Goal: Patient will remain free from falls  Outcome: Progressing     Problem: Neuro Status  Goal: Neuro status will remain stable or improve  Outcome: Progressing       Patient is not progressing towards the following goals:

## 2024-03-29 NOTE — PROGRESS NOTES
Neurology Interval note:       MRI head w and wo completed, official read not yet available. On independent review she has multiple moderate sized areas of restricted diffusion in the R frontal and parietal lobes, multiple small areas scattered throughout R and L frontal and parietal lobes. Areas are concerning for acute ischemic strokes likely from a cardioembolic source. In the setting of positive blood cultures and HX of TAVR (9/2023) likely infective vs thrombotic though pt does have a hx of Protein S deficiency. Images reviewed with Dr Kelly, who recommends stat infective carditis workup including stat ESR, CRP and TTE. Hold  Xeralto and all antiplatelet therapy until infective endocarditis can be excluded to reduce the risk of hemorrhagic conversion.     Dr. Parker notified of wet read and recommendations for infective endocarditis workup.      Therese Butt, APRN

## 2024-03-29 NOTE — CARE PLAN
The patient is Stable - Low risk of patient condition declining or worsening    Shift Goals  Clinical Goals: Monitor neuro status, maintain skin integrity  Patient Goals: Rest  Family Goals: wilmer    Progress made toward(s) clinical / shift goals:      Problem: Neuro Status  Goal: Neuro status will remain stable or improve  Outcome: Progressing    Q4H neuro checks in place. Pt's neurological status (A/Ox4) remains unchanged throughout shift. Bed alarm is on, call light within reach.     Problem: Skin Integrity  Goal: Skin integrity is maintained or improved  Outcome: Progressing  Pt turned and repositioned Q2H or as requested. Barrier cream and mepilexes used to prevent skin breakdown on delicate perineal areas and bony prominences. Linen changes provided as needed to avoid risk of developing pressure ulcers.      Patient is not progressing towards the following goals:

## 2024-03-29 NOTE — PROGRESS NOTES
Hospital Medicine Daily Progress Note    Date of Service  3/29/2024    Chief Complaint  Marry Mathur is a 88 y.o. female admitted 3/25/2024 with altered mental status    Hospital Course  88 y.o. female who presented 3/25/2024 with altered mental status.  History limited as patient is confused at bedside.  Patient reports that she went to the restroom and caught her foot on an object, and had been lying down on the ground for a few minutes.  She was too weak to get up.  Per EMS, neighbors checked on patient and found her on the ground, unclear of how long she was actually down on the ground.  She was then brought to ER for evaluation.     In ER, patient found to have normal vital signs.  Pertinent labs include prerenal azotemia, .  UA showing many bacteria, elevated white blood cell count/nitrite.  CT head showing suspected age-indeterminate infarcts.  Chest x-ray showed no acute findings.    Interval Problem Update  No acute events overnight.  Patient wanting breakfast on time. Son Nando at bedside.  Discussed results of echocardiogram showing 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 concerning for vegetation possible endocarditis.  MRI brain showing multiple foci of acute infarction showered across bilateral hemispheres, cerebellum and brainstem concerning for cardioembolic stroke.  ID consulted, antibiotics changed to ceftriaxone with CNS dosing.  ID and neurology recommend VINAY.  Cardiology consulted, appreciate their recommendations and anticipate VINAY, timing to be decided.  Holding anticoagulation for now per neurology.  Patient with hx of DVT and protein S deficiency, holding home anticoagulation.  Blood cultures 3/27 NGTD. 3/25 cultures with strep species, strep viridans. Son states patient had temporary crown placed last month.  WBC remains elevated 18.6k.        I have discussed this patient's plan of care and discharge plan at IDT rounds today with Case Management, Nursing, Nursing leadership, and other  members of the IDT team.    Consultants/Specialty  General Neurology   Vascular Neurology    Code Status  Full Code    Disposition  The patient is not medically cleared for discharge to home or a post-acute facility.      I have placed the appropriate orders for post-discharge needs.    Review of Systems  Review of Systems   Constitutional:  Positive for malaise/fatigue. Negative for chills and fever.   Respiratory:  Negative for shortness of breath.    Cardiovascular:  Negative for chest pain.   Gastrointestinal:  Negative for abdominal pain, nausea and vomiting.   Musculoskeletal:  Positive for falls. Negative for myalgias.   Skin:  Negative for rash.   Neurological:  Positive for sensory change and weakness. Negative for headaches.   Psychiatric/Behavioral:  Negative for depression.    All other systems reviewed and are negative.       Physical Exam  Temp:  [36.3 °C (97.3 °F)-37 °C (98.6 °F)] 37 °C (98.6 °F)  Pulse:  [48-89] 89  Resp:  [18-20] 20  BP: (101-145)/(50-80) 145/78  SpO2:  [94 %-98 %] 94 %    Physical Exam  Vitals and nursing note reviewed.   Constitutional:       General: She is not in acute distress.     Appearance: She is ill-appearing (chronically).      Comments: Son at bedside    HENT:      Head: Normocephalic and atraumatic.      Mouth/Throat:      Pharynx: Oropharynx is clear.   Eyes:      Conjunctiva/sclera: Conjunctivae normal.   Cardiovascular:      Rate and Rhythm: Normal rate and regular rhythm.      Pulses: Normal pulses.   Pulmonary:      Effort: Pulmonary effort is normal. No respiratory distress.      Breath sounds: Normal breath sounds. No wheezing.   Abdominal:      General: Abdomen is flat. There is no distension.      Palpations: Abdomen is soft.      Tenderness: There is no abdominal tenderness.   Musculoskeletal:         General: Normal range of motion.      Cervical back: Neck supple.      Right lower leg: No edema.      Left lower leg: No edema.   Skin:     General: Skin is  warm and dry.   Neurological:      General: No focal deficit present.      Mental Status: She is alert.      Cranial Nerves: No cranial nerve deficit.      Motor: Weakness present.   Psychiatric:         Mood and Affect: Mood is anxious.         Fluids  No intake or output data in the 24 hours ending 03/29/24 1457        Laboratory  Recent Labs     03/27/24  0409 03/28/24  0103 03/29/24  0035   WBC 16.3* 19.0* 18.6*   RBC 4.07* 4.18* 3.96*   HEMOGLOBIN 12.0 12.5 11.4*   HEMATOCRIT 35.2* 37.1 34.6*   MCV 86.5 88.8 87.4   MCH 29.5 29.9 28.8   MCHC 34.1 33.7 32.9   RDW 46.6 47.9 46.5   PLATELETCT 187 162* 173   MPV 9.8 10.5 10.5     Recent Labs     03/27/24  0409 03/28/24  0103 03/29/24  0035   SODIUM 136 134* 133*   POTASSIUM 3.5* 4.1 3.8   CHLORIDE 106 104 103   CO2 17* 17* 17*   GLUCOSE 147* 120* 111*   BUN 16 12 13   CREATININE 0.60 0.39* 0.51   CALCIUM 7.2* 6.8* 7.1*             Recent Labs     03/29/24  0035   TRIGLYCERIDE 61   HDL 31*   LDL 48       Imaging  EC-ECHOCARDIOGRAM COMPLETE W/O CONT   Final Result      MR-BRAIN-WITH & W/O   Final Result         Multiple foci of acute infarction scattered throughout the bilateral cerebral hemispheres, cerebellum and brainstem as described above, suggesting a proximal cardiovascular embolic source.      The largest cluster of acute infarctions is noted in the right frontoparietal region involving the right MCA territory.      Age-related volume loss.      CT-CTA NECK WITH & W/O-POST PROCESSING   Final Result      No focal high-grade stenosis, dissection or occlusion of the cervical carotid or vertebral arteries.      CT-CTA HEAD WITH & W/O-POST PROCESS   Final Result      1.  Short segment decreased enhancement in the RIGHT superior sylvian division of middle superior artery, M3 segment, focal stenosis versus nonocclusive clot.   2.  No intracranial aneurysm or abrupt large vessel cut off.      CT-HEAD W/O   Final Result      1.  Hypoattenuating foci in the bilateral  cerebellum which are indeterminant. They could be subacute infarcts versus other. Recommend brain MRI workup.   2.  Redemonstration of a small focus of low-attenuation in the left parietal deep and subcortical white matter. Possibly gliosis versus subacute infarct.   3.  Findings are stable since the noncontrast head CT performed one day prior.         CT-HEAD W/O   Final Result      1.  Small, rounded hypodense areas of the bilateral cerebellar hemispheres. These may represent age-indeterminate infarcts. Less likely this could represent edema related to metastatic disease.   2.  There is a small area of loss of gray-white differentiation in the left parietal lobe, possibly an age-indeterminate infarct.   3.  These findings can be further evaluated with contrast-enhanced MRI as indicated.   4.  Cerebral atrophy.   5.  White matter lucencies most consistent with      DX-CHEST-PORTABLE (1 VIEW)   Final Result      No acute findings.           Assessment/Plan  * Acute metabolic encephalopathy- (present on admission)  Assessment & Plan  Patient presents with confusion.  UA pertinent for UTI, likely culprit for encephalopathy.  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, cardiology consulted for VINAY    Stroke (cerebrum) (HCC)  Assessment & Plan  MRI brain confirms multiple foci of acute infarction bilaterally concerning for cardioembolic strokes  Possible septic emboli from endocarditis  Patient with hx of protein S deficiency and DVT, on anticoagulation at home, currently AC is held  Neurology following  ID and cardiology  consulted    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, based on echo showing 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 in setting of positive blood cultures  MRI brain with multiple foci of acute infarcts concerning for cardioembolic stroke  On ceftriaxone  Following blood cultures  ID, neurology, cardiology following  Anticipate VINAY for endocarditis evaluation    Acute cystitis without hematuria  Assessment & Plan  Continue ancef  Urine culture with E coli    Positive blood culture  Assessment & Plan  Blood cultures 3/25 with strep species  Repeat blood cultures 3/27 taken  Urine culture positive for E coli    S/P TAVR (transcatheter aortic valve replacement)- (present on admission)  Assessment & Plan  Performed in September 2023    Protein S deficiency (HCC)- (present on admission)  Assessment & Plan  Holding home xarelto    History of recurrent deep vein thrombosis (DVT)- (present on admission)  Assessment & Plan  On Xarelto at home  Hold AC for now given strokes    ACP (advance care planning)  Assessment & Plan  16 minutes spent discussing goals of care with patient's son (Nando [phone]) via telephone.  He was explained about patient's current clinical condition and treatment plan.  He states that patient was confused when he was on the phone with her this morning, and that she did not recognize who he was.  He spoke with her in the evening yesterday and patient appeared to be at baseline.  He was asked about CODE STATUS, as he is power of , he states that he would like patient to be full code and to have everything done, including chest compressions and intubation.  He has a flight here tomorrow afternoon    Other hyperlipidemia- (present on admission)  Assessment & Plan  Lipitor    Hypertension  Assessment & Plan  Restart         VTE prophylaxis: SCDs    My total time spent caring for the patient on the day of the encounter was 50 minutes. This time includes reviewing the hospital chart vitals, lab tests, and  imaging; counseling and educating the patient about their diagnoses; coordinating care with the nurse, pharmacist, and specialists; documenting clinical information in the electronic medical record.  This does not include time spent on separately billable procedures/tests.

## 2024-03-29 NOTE — CONSULTS
Cardiology Initial Consultation    Date of Service  3/29/2024    Referring Physician  Dash Parker M.D.    Reason for Consultation    Cardiology is consulted evaluation of VINAY in the setting of infective endocarditis    History of Presenting Illness  Marry Mathur is a 88 y.o. female with severe AS s/p TAVR 9/25/2023, C 6/29/2023 minimal nonobstructive CAD, RBBB, protein S deficiency, recurrent DVT, presence of IVC filter, on OAC with Xarelto, malignant melanoma of chest wall, rheumatoid arthritis, right-sided colon cancer who presented 3/25/2024 for evaluation of confusion & altered mental status.     3/25/2024 urine culture positive for E. coli    3/28/2024 MRI completed demonstrated multiple foci of acute infarction scattered throughout bilateral hemispheres,  cerebellum and brainstem.    Echo 3/29/2024 revealed mobile mass noted on mitral leaflets.    Blood cultures 3/25/2024 positive for strep species, strep viridans.  Reportedly hx of temporary crown placed last month.      Review of Systems  Review of Systems   Unable to perform ROS: Acuity of condition     Worsening neuro status, stat CT head in progress  Past Medical History   has a past medical history of Adenocarcinoma of colon ([facility]) (10/30/2018), Anesthesia, Back pain (06/01/2022), Blood clotting disorder ([facility]) (2012), Bowel habit changes, Breath shortness (06/26/2023), Cancer ([facility]), Cataract, Chronic back pain greater than 3 months duration, Deep vein blood clot of left lower extremity ([facility]) (2022), Deep vein thrombosis ([facility]) (03/08/2016), Essential hypertension, benign (06/27/2012), GERD (gastroesophageal reflux disease) (06/27/2012), Heart murmur, High cholesterol, Hyperlipidemia, Hypertension, Impaired fasting glucose (11/02/2017), Melanoma ([facility]), Mild aortic stenosis, Other and unspecified hyperlipidemia (06/27/2012), Prediabetes, Protein S deficiency ([facility]) (11/02/2017), Rheumatoid arthritis involving multiple sites with positive  rheumatoid factor (HCC) (03/14/2016), Rheumatoid nodule (HCC) (07/25/2017), Right bundle branch block (06/27/2012), and Urinary incontinence (11/02/2017).    She has no past medical history of Acute nasopharyngitis, Anginal syndrome (HCC), Asthma, Carcinoma in situ of respiratory system, Congestive heart failure (HCC), Continuous ambulatory peritoneal dialysis status (HCC), Coughing blood, Dental disorder, Diabetes (HCC), Dialysis patient (HCC), Disorder of thyroid, Emphysema of lung (HCC), Glaucoma, Gynecological disorder, Heart burn, Hepatitis A, Hepatitis B, Hepatitis C, Hiatus hernia syndrome, Indigestion, Infectious disease, Jaundice, Myocardial infarct (HCC), Pacemaker, Pneumonia, Pregnant, Psychiatric problem, Renal disorder, Rheumatic fever, Seizure (HCC), Sleep apnea, Snoring, Stroke (HCC), Tuberculosis, or Urinary bladder disorder.    Surgical History   has a past surgical history that includes hysterectomy, total abdominal (1970's); arthroplasty (Left); cholecystectomy; other orthopedic surgery (2011); inguinal hernia repair (Right, 09/23/2016); other (08/12/2014); rotator cuff repair (Bilateral); hemicolectomy (Right, 12/13/2018); irrigation & debridement ortho (Bilateral, 07/31/2020); lumbar medial branch blocks (Bilateral, 12/15/2020); wide excision, lesion, head and neck region (Left, 03/29/2022); block epidural steroid injection (Right, 05/10/2022); knee arthroplasty total (Left); block epidural steroid injection (Right, 05/31/2022); pr remove armpits lymph nodes complt (Left, 06/07/2022); pr injection,sacroiliac joint (Right, 07/12/2022); lumbar fusion o-arm (09/21/2022); lumbar decompression (09/21/2022); lumbar laminectomy diskectomy (09/21/2022); foraminotomy (09/21/2022); other (Left); no pertinent past surgical history (2022); transcatheter aortic valve replacement (Bilateral, 9/25/2023); echocardiogram, transesophageal, intraoperative (N/A, 9/25/2023); and pacemaker insertion (Right,  9/25/2023).    Family History  family history includes Cancer in her father and mother; Heart Disease in her brother and sister; Leukemia in her father; Other in her son.    Social History   reports that she has never smoked. She has never used smokeless tobacco. She reports that she does not currently use alcohol. She reports that she does not use drugs.    Medications  Prior to Admission Medications   Prescriptions Last Dose Informant Patient Reported? Taking?   Calcium Carbonate 600 MG Tab 3/25/2024 at am Patient Yes Yes   Sig: Take 600 mg by mouth every morning.   MAGNESIUM PO 3/25/2024 at am Patient Yes Yes   Sig: Take 1 Tablet by mouth every day.   Melatonin 10 MG Tab 3/24/2024 at hs Patient Yes Yes   Sig: Take 10 mg by mouth every evening.   Multiple Vitamins-Minerals (PRESERVISION AREDS 2 PO) 3/25/2024 at am Patient Yes Yes   Sig: Take 1 Capsule by mouth 2 times a day.   SODIUM FLUORIDE 5000 PLUS 1.1 % Cream 3/24/2024 at pm Patient Yes Yes   Sig: BRUSH WITH PEA SIZEED AMOUNT ONCE A DAY PREFERABLY BEFORE BEDTIME. SPIT OUT ALL EXCESS. DO NOT RINSE   acetaminophen (TYLENOL) 500 MG Tab 3/25/2024 at am Patient Yes Yes   Sig: Take 750 mg by mouth 2 times a day as needed for Mild Pain. 1.5 in am, 1 tab at noon, 2 in pm  Indications: Pain   amLODIPine (NORVASC) 2.5 MG Tab 3/24/2024 at pm Patient Yes Yes   Sig: Take 2.5 mg by mouth every evening.   atorvastatin (LIPITOR) 20 MG Tab 3/24/2024 at pm Patient No Yes   Sig: Take 1 Tablet by mouth every evening.   diphenhydrAMINE-APAP, sleep,  MG Tab 3/24/2024 at hs Patient Yes Yes   Sig: Take 2 Tablets by mouth at bedtime.   gabapentin (NEURONTIN) 300 MG Cap 3/25/2024 at am Patient Yes Yes   Sig: Take 300 mg by mouth 3 times a day.   lidocaine (LIDODERM) 5 % Patch 3/24/2024 at am Patient Yes Yes   Sig: APPLY 1 PATCH TO SKIN DAILY FOR 12 HOURS ON THEN 12 HOURS OFF   loperamide (IMODIUM) 2 MG Cap 3/24/2024 at am Patient No Yes   Sig: Take 1 Capsule by mouth 4 times a  day as needed for Diarrhea for up to 7 days.   losartan (COZAAR) 50 MG Tab 3/25/2024 at am Patient No Yes   Sig: TAKE 1 TABLET BY MOUTH EVERY DAY   omeprazole (PRILOSEC) 20 MG delayed-release capsule 3/25/2024 at am Patient No Yes   Sig: Take 1 Capsule by mouth every morning.   rivaroxaban (XARELTO) 20 MG Tab tablet 3/24/2024 at pm Patient No Yes   Sig: Take 1 Tablet by mouth with dinner.   vitamin D (CHOLECALCIFEROL) 1000 UNIT Tab 3/25/2024 at am Patient Yes Yes   Sig: Take 1,000 Units by mouth every morning.      Facility-Administered Medications: None       Allergies  Allergies   Allergen Reactions    Wound Dressing Adhesive      Pt says band-aids cause skin reaction/rash if on for more than 2 days  Paper tape OK         Vital signs in last 24 hours  Temp:  [36.3 °C (97.3 °F)-37 °C (98.6 °F)] 37 °C (98.6 °F)  Pulse:  [48-89] 89  Resp:  [18-20] 20  BP: (101-145)/(50-80) 145/78  SpO2:  [94 %-98 %] 94 %    Physical Exam  Physical Exam  Cardiovascular:      Rate and Rhythm: Normal rate and regular rhythm.      Pulses: Normal pulses.   Pulmonary:      Effort: Pulmonary effort is normal.   Skin:     General: Skin is warm.         Lab Review  Lab Results   Component Value Date/Time    WBC 18.6 (H) 03/29/2024 12:35 AM    RBC 3.96 (L) 03/29/2024 12:35 AM    HEMOGLOBIN 11.4 (L) 03/29/2024 12:35 AM    HEMATOCRIT 34.6 (L) 03/29/2024 12:35 AM    MCV 87.4 03/29/2024 12:35 AM    MCH 28.8 03/29/2024 12:35 AM    MCHC 32.9 03/29/2024 12:35 AM    MPV 10.5 03/29/2024 12:35 AM      Lab Results   Component Value Date/Time    SODIUM 133 (L) 03/29/2024 12:35 AM    POTASSIUM 3.8 03/29/2024 12:35 AM    CHLORIDE 103 03/29/2024 12:35 AM    CO2 17 (L) 03/29/2024 12:35 AM    GLUCOSE 111 (H) 03/29/2024 12:35 AM    BUN 13 03/29/2024 12:35 AM    CREATININE 0.51 03/29/2024 12:35 AM      Lab Results   Component Value Date/Time    ASTSGOT 33 03/29/2024 12:35 AM    ALTSGPT 34 03/29/2024 12:35 AM     Lab Results   Component Value Date/Time     "CHOLSTRLTOT 91 (L) 03/29/2024 12:35 AM    LDL 48 03/29/2024 12:35 AM    HDL 31 (A) 03/29/2024 12:35 AM    TRIGLYCERIDE 61 03/29/2024 12:35 AM    TROPONINT 28 (H) 09/27/2022 07:06 PM       No results for input(s): \"NTPROBNP\" in the last 72 hours.    Cardiac Imaging and Procedures Review  EKG: Second-degree AV block, Mobitz 1      Echocardiogram:    Hyperdynamic LV systolic function  LVEF 70 to 75%.  Mobile mass noted on thickened mitral valve.  Mild mitral stenosis.  Known TAVR aortic valve that is functioning normally with normal transvalvular gradient.  No regional wall motion abnormalities.      Cardiac Catheterization:    MPRESSION:  6/29/23  Severe aortic stenosis by invasive assessment.  Normal left heart filling pressures.  Normal right heart filling pressures.  Normal pulmonary artery pressures for age.  Minimal nonobstructive coronary artery disease.  Minimal nonobstructive iliofemoral arterial disease.       Imaging  Chest X-Ray: No acute findings     MR-brain  3/28/2024.   Multiple foci of acute infarction scattered throughout the bilateral cerebral hemispheres, cerebellum and brainstem as described above, suggesting a proximal cardiovascular embolic source.     The largest cluster of acute infarctions is noted in the right frontoparietal region involving the right MCA territory.         Assessment/Plan    # Infective endocarditis.  # Positive blood cultures 3/25/2023 with strep species.  # Recent Echo revealed mobile mass noted on thickened mitral valve.  # AS s/p TAVR 9/25/23  # Echo known TAVR aortic valve that is functioning normally with normal transvalvular gradient  # Multiple acute CVA  # Urine culture positive for E. Coli  # Protein S deficiency  # Recurrent DVT  # Rivaroxaban on hold in the setting of multiple acute CVA.    -Tentatively plan for transesophageal echocardiogram on Monday.  -Will keep patient n.p.o. Sunday at midnight.      Cardiology will follow along.  I personally spent a total " of 30 minutes which includes face-to-face time and non-face-to-face time spent on preparing to see the patient, reviewing hospital notes and tests, obtaining history from the patient, performing a medically appropriate exam, counseling and educating the patient, ordering medications/tests/procedures/referrals as clinically indicated, and documenting information in the electronic medical record.     Thank you for allowing me to participate in the care of this patient.    I will continue to follow this patient    Please contact me with any questions.    CELIO Han.   Cardiologist, [facility]  [phone]

## 2024-03-29 NOTE — ASSESSMENT & PLAN NOTE
Suspected, based on echo showing 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 in setting of positive blood cultures  MRI brain with multiple foci of acute infarcts concerning for cardioembolic stroke  On ceftriaxone  Following blood cultures  ID, neurology, cardiology following  VINAY on Monday

## 2024-03-29 NOTE — DISCHARGE PLANNING
"Spoke with patients son Nando, he is interested in IPR for his mom, he says no to SNF placement stating \"I know how they work and it's not good\" Informed medical team, they are placing a consult.    His next plan is to move his mom down to [facility] where he lives to an assisted living facility, she lives alone and he feels it is not possible for her any more.  "

## 2024-03-29 NOTE — THERAPY
Physical Therapy   Initial Evaluation     Patient Name: Marry Mathur  Age:  88 y.o., Sex:  female  Medical Record #: [id number]  Today's Date: 3/29/2024     Precautions  Precautions: (P) Fall Risk;Swallow Precautions    Assessment  Patient is 88 y.o. female with a diagnosis of admitted on 3/25/24 with AMS.  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    MRI findings: Multiple foci of acute infarction scattered throughout the bilateral cerebral hemispheres, cerebellum and brainstem as described above, suggesting a proximal cardiovascular embolic source.   The largest cluster of acute infarctions is noted in the right frontoparietal region involving the right MCA territory.  PT able to open eyes to commands but does not sustain, Pt assisted to EOB with increased alertness and ability to follow commands for posture and limb movement at bedside. Pt able to maintain static sit for short periods but overall required Max A progressing to CGA after set up. Please see below for status, goals, POC and family education.       Physical Therapy Initial Treatment Plan   Treatment Plan : (P) Bed Mobility, Gait Training, Manual Therapy, Neuro Re-Education / Balance, Self Care / Home Evaluation, Therapeutic Activities, Therapeutic Exercise  Treatment Frequency: (P) 5 Times per Week  Duration: (P) Until Therapy Goals Met    DC Equipment Recommendations: (P) Unable to determine at this time  Discharge Recommendations: (P) Recommend post-acute placement for additional physical therapy services prior to discharge home (Recommend PM&R consult.  Monitor for IRF potential)       Initial Contact Note    Initial Contact Note Order Received and Verified, Physical Therapy Evaluation in Progress with Full Report to Follow.   Precautions   Precautions Fall Risk;Swallow Precautions   Vitals   O2 (LPM) 2   O2 Delivery Device Nasal Cannula   Pain 0 - 10  "Group   Therapist Pain Assessment Post Activity Pain Same as Prior to Activity;0   Prior Living Situation   Prior Services Home-Independent   Housing / Facility 1 Story House   Bathroom Set up Walk In Shower   Equipment Owned Front-Wheel Walker;Tub / Shower Seat   Lives with - Patient's Self Care Capacity Alone and Able to Care For Self   Prior Level of Functional Mobility   Bed Mobility Independent   Transfer Status Independent   Ambulation Independent   Ambulation Distance household.   History of Falls   Date of Last Fall   (reason for admission found down at home. No prior h/o falls.)   Cognition    Level of Consciousness Responds to voice   Ability To Follow Commands 2 Step   Comments able to follow commands from MMT and push pull exercises at EOB.  following commands for forward flexion of trunk, lifting head and stabilizing with BUE's on bed.   Passive ROM Lower Body   Passive ROM Lower Body WDL   Active ROM Lower Body    Comments limited to 50 % of range in sitting performing LAQ.   Strength Lower Body   Lower Body Strength  WDL   Rt Hip Flexion Strength 3- (F-)   Rt Knee Extension Strength 3- (F-)   Rt Ankle Dorsiflexion Strength 3- (F-)   Lt Hip Flexion Strength 2+ (P+)   Lt Knee Flexion Strength 2+ (P+)   Comments limited by decreased level of alertness, tested in sitting position. Recommend continued monitoring of strength grades during PT treatments.   Sensation Lower Body   Lower Extremity Sensation   WDL   Comments pt states \"yes\" to LLE and RLE light touch. Will need to verify during subsequent session. No neglect noted.   Lower Body Muscle Tone   Comments increased tone LLE vs RLE ( tested in supine)   Neurological Concerns   Neurological Concerns Yes   Sitting Posture During ADL's Posterior Lean   Coordination Lower Body    Coordination Lower Body  Not Applicable   Vision   Vision Comments unable to test at this time, No obvious neglect noted.   Other Treatments   Other Treatments Provided Pt's son " present during session. Updated on findings, discussed PT plan of care and role of therapy in post CVA process. Educated on different levels of post acute care including IRF, SNF rehab and SNF long tern care. Son is insistent pt will not go to skilled long term care but was open to rehab and SNF rehab but wants pt to have the best discharge. Son states he can hire assistance and may eventually transfer pt to [place] to be by family. Explained discharge has not been set and pt is still being managed medically for cause of CVA.   Balance Assessment   Sitting Balance (Static) Fair -   Sitting Balance (Dynamic) Trace +  (unable to tolerate perturbations.)   Weight Shift Sitting Poor   Bed Mobility    Supine to Sit Total Assist   Sit to Supine Refused   Rolling Total Assist to Lt.;Maximal Assist to Rt.   Gait Analysis   Gait Level Of Assist Unable to Participate   Functional Mobility   Sit to Stand   (NT)   6 Clicks Assessment - How much HELP from from another person do you currently need... (If the patient hasn't done an activity recently, how much help from another person do you think he/she would need if he/she tried?)   Turning from your back to your side while in a flat bed without using bedrails? 1   Moving from lying on your back to sitting on the side of a flat bed without using bedrails? 1   Moving to and from a bed to a chair (including a wheelchair)? 1   Standing up from a chair using your arms (e.g., wheelchair, or bedside chair)? 1   Walking in hospital room? 1   Climbing 3-5 steps with a railing? 1   6 clicks Mobility Score 6   Activity Tolerance   Sitting Edge of Bed 10   Patient / Family Goals    Patient / Family Goal #1 Per son; goal is DC home with family.   Short Term Goals    Short Term Goal # 1 Pt will perform rolling with use of handrails and Mod A or facilitation by tx6   Short Term Goal # 2 Pt will perform side to sit to EOB with Mod A by tx 6   Short Term Goal # 3 Pt will maintain static sitting  balance with CGA for 3 min by tx 6   Short Term Goal # 4 Pt will transfer from bed to chair with Mod A by tx 6   Short Term Goal # 5 Pt will ambulate for 8 ft x 4 in bars with Mod A  for facilitation by tx 6   Education Group   Education Provided Role of Physical Therapist;Exercises - Supine;Cerebral Vascular Accident   Role of Physical Therapist Patient Response Family;Acceptance;Explanation;Verbal Demonstration   Exercises - Supine Patient Response Acceptance;Explanation;Verbal Demonstration  (RN educated on identifying increased tone in extremities. PROM)   CVA Patient Response Family;Acceptance;Explanation;Reinforcement Needed   Physical Therapy Initial Treatment Plan    Treatment Plan  Bed Mobility;Gait Training;Manual Therapy;Neuro Re-Education / Balance;Self Care / Home Evaluation;Therapeutic Activities;Therapeutic Exercise   Treatment Frequency 5 Times per Week   Duration Until Therapy Goals Met   Problem List    Problems Impaired Bed Mobility;Impaired Transfers;Impaired Ambulation;Functional Strength Deficit;Functional ROM Deficit;Impaired Balance;Decreased Activity Tolerance   Anticipated Discharge Equipment and Recommendations   DC Equipment Recommendations Unable to determine at this time   Discharge Recommendations Recommend post-acute placement for additional physical therapy services prior to discharge home  (Recommend PM&R consult.  Monitor for IRF potential)   Interdisciplinary Plan of Care Collaboration   IDT Collaboration with  Family / Caregiver;Nursing;Certified Nursing Assistant;Speech Therapist   Patient Position at End of Therapy In Bed;Call Light within Reach;Tray Table within Reach;Family / Friend in Room  (ST in room)   Collaboration Comments Staff updated. Family education initiated.   Session Information   Date / Session Number  3/29-1 ( 1/5,4/4)

## 2024-03-29 NOTE — CONSULTS
"INFECTIOUS DISEASES INPATIENT CONSULT NOTE     Date of Service:3/29/24    Consult Requested By: Dash Parker M.D.    Reason for Consultation: +blood cult; possible endocarditis    History of Present Illness:   Marry Mathur is a 88 y.o. female admitted 3/25/2024 after a fall for AMS  Somnolent-history from records  \" ...protein S deficiency on Xarelto, HTN, HLD who presented to [facility] after a fall and altered mental status.  Patient was found to have UA concerning for underlying UTI\" Neuro consulted for suspected stroke  \"after patient was found lethargic slumped to the left side with reports of diminished muscle activity in left upper and left lower extremity.  Upon evaluation at bedside patient had nearly equal strength in bilateral upper extremities but both drifts to the bed left slightly worse than right.  Of note tone appears appropriate in bilateral upper extremities.  Patient was confused, not oriented, poor comprehension and follows almost no commands.  Patient did have pain in bilateral upper and lower extremities and did not have any demonstrable cranial nerve abnormalities.  Decision was made to obtain follow-up CT head CTA head and neck imaging to rule out vessel occlusion. \" CTA no occlusion but \"Short segment decreased enhancement in the RIGHT superior sylvian division of middle superior artery, M3 segment, focal stenosis versus nonocclusive clot.  2.  No intracranial aneurysm or abrupt large vessel cut off.\"  Afebrile since admission +leukocytosis  Blood cult +strep gordonii 3/25  Urine cult for Ecoli-no reported  complaints PTA.  MRI on 3/28 showing mult acute infarcts  Infectious Diseases consulted for antibiotic recommendation and management      Review Of Systems:  Lethargic-not following commands    PMH:   Past Medical History:   Diagnosis Date    Adenocarcinoma of colon (HCC) 10/30/2018    From sessile serrated polyp 10/2018    Anesthesia     pt states \"one new dr scraped my " "esophagus when they put the tube in and I started bleeding so they couldn't operate\"     Back pain 06/01/2022    0/10    Blood clotting disorder (HCC) 2012    clot in leg    Bowel habit changes     constipation     Breath shortness 06/26/2023    with exertion    Cancer (HCC)     skin, colon 2018    Cataract     belia IOL     Chronic back pain greater than 3 months duration     Deep vein blood clot of left lower extremity (HCC) 2022    Deep vein thrombosis (HCC) 03/08/2016    First occurrence in [facility] in late 1970s Second occurrence further up in [facility] in 2012, has been on AC since     Essential hypertension, benign 06/27/2012    GERD (gastroesophageal reflux disease) 06/27/2012    Heart murmur     High cholesterol     Hyperlipidemia     Hypertension     hx of, not currently     Impaired fasting glucose 11/02/2017    Melanoma ([facility])     Mild aortic stenosis     Seeing Dr. Van    Other and unspecified hyperlipidemia 06/27/2012    Prediabetes     Protein S deficiency ([facility]) 11/02/2017    Noted in lab work 2013 as a part of work up at [facility]     Rheumatoid arthritis involving multiple sites with positive rheumatoid factor ([facility]) 03/14/2016    Dr. Escoto    Rheumatoid nodule ([facility]) 07/25/2017    Right bundle branch block 06/27/2012    pt denies     Urinary incontinence 11/02/2017         PSH:  Past Surgical History:   Procedure Laterality Date    TRANSCATHETER AORTIC VALVE REPLACEMENT Bilateral 9/25/2023    Procedure: TRANSCATHETER AORTIC VALVE REPLACEMENT;  Surgeon: Haseeb Serrano M.D.;  Location: SURGERY [facility];  Service: Cardiac    ECHOCARDIOGRAM, TRANSESOPHAGEAL, INTRAOPERATIVE N/A 9/25/2023    Procedure: ECHOCARDIOGRAM, TRANSESOPHAGEAL, INTRAOPERATIVE;  Surgeon: Haseeb Serrano M.D.;  Location: SURGERY [facility];  Service: Cardiac    PACEMAKER INSERTION Right 9/25/2023    Procedure: INSERTION, TEMPORARY CARDIAC PACEMAKER;  Surgeon: Haseeb Serrano M.D.;  Location: SURGERY [facility];  Service: Cardiac    " LUMBAR FUSION O-ARM  09/21/2022    Procedure: L3-4 and L4-5 decompressive laminectomy, bilateral foraminotomies and L3-4-5 posterior lumbar instrumented fusion;  Surgeon: Rosa M Bonner M.D.;  Location: SURGERY [facility];  Service: Neurosurgery    LUMBAR DECOMPRESSION  09/21/2022    Procedure: DECOMPRESSION, SPINE, LUMBAR;  Surgeon: Rosa M Bonner M.D.;  Location: SURGERY [facility];  Service: Neurosurgery    LUMBAR LAMINECTOMY DISKECTOMY  09/21/2022    Procedure: LAMINECTOMY, SPINE, LUMBAR, WITH DISCECTOMY;  Surgeon: Rosa M Bonner M.D.;  Location: SURGERY [facility];  Service: Neurosurgery    FORAMINOTOMY  09/21/2022    Procedure: FORAMINOTOMY, SPINE;  Surgeon: Rosa M Bonner M.D.;  Location: [facility];  Service: Neurosurgery    NC INJECTION,SACROILIAC JOINT Right 07/12/2022    Procedure: RIGHT sacroiliac joint injection with fluoroscopic guidance.;  Surgeon: Indira Lee M.D.;  [facility];  Service: Pain Management    NC REMOVE ARMPITS LYMPH NODES COMPLT Left 06/07/2022    Procedure: LYMPHADENECTOMY, AXILLARY;  Surgeon: Bernardino Torres M.D.;  Location: SURGERY SAME DAY [facility];  Service: General    BLOCK EPIDURAL STEROID INJECTION Right 05/31/2022    Procedure: RIGHT L4-5 and L5-S1 transforaminal epidural steroid injection with fluoroscopic guidance.;  Surgeon: Indira Lee M.D.;  Location: [facility];  Service: Pain Management    BLOCK EPIDURAL STEROID INJECTION Right 05/10/2022    Procedure: Lumbar L4-5 interlaminar epidural steroid injection;  Surgeon: Indira Lee M.D.;  Location: [facility];  Service: Pain Management    WIDE EXCISION, LESION, HEAD AND NECK REGION Left 03/29/2022    Procedure: WIDE EXCISION, LESION, HEAD AND NECK REGION - RADICAL MALIGNANT MELANOMA OF LEFT CHEST;  Surgeon: Bernardino Torres M.D.;  Location: SURGERY SAME DAY [facility];  Service: General    LUMBAR MEDIAL BRANCH BLOCKS Bilateral  12/15/2020    Procedure: BLOCK, NERVE, SPINAL, LUMBAR, POSTERIOR RAMUS, MEDIAL BRANCH;  Surgeon: Chris Cooper M.D.;  Location: [facility];  Service: Pain Management    IRRIGATION & DEBRIDEMENT ORTHO Bilateral 07/31/2020    Procedure: IRRIGATION AND DEBRIDEMENT, WOUND - KNEE;  Surgeon: Roosevelt Saucedo M.D.;  Location: SURGERY [facility];  Service: Orthopedics    HEMICOLECTOMY Right 12/13/2018    Procedure: OPEN RIGHT HEMICOLECTOMY;  Surgeon: Dash Bhagat M.D.;  Location: SURGERY [facility];  Service: General    INGUINAL HERNIA REPAIR Right 09/23/2016    Procedure: INGUINAL HERNIA REPAIR - PRIMARY;  Surgeon: Mary Medina M.D.;  Location: SURGERY [facility];  Service:     OTHER  08/12/2014    peroneal nerve surgery Dr. Castro     OTHER ORTHOPEDIC SURGERY  2011    meniscus repair    ARTHROPLASTY Left     hip replacement    CHOLECYSTECTOMY      HYSTERECTOMY, TOTAL ABDOMINAL  1970's    KNEE ARTHROPLASTY TOTAL Left     NO PERTINENT PAST SURGICAL HISTORY  2022    Skin Cancer, Lymph nodes removed    OTHER Left     Upper chest, skin cancer removed    ROTATOR CUFF REPAIR Bilateral        FAMILY HX:  Family History   Problem Relation Age of Onset    Cancer Mother         stomach- ruptured appendix    Cancer Father         colon    Leukemia Father         many exposure, worked for [employer]     Heart Disease Sister     Heart Disease Brother         s/p stent     Other Son         on blood thinner, unclear etiology    Clotting Disorder Neg Hx        SOCIAL HX:  Social History     Socioeconomic History    Marital status:      Spouse name: Not on file    Number of children: Not on file    Years of education: Not on file    Highest education level: Not on file   Occupational History    Not on file   Tobacco Use    Smoking status: Never    Smokeless tobacco: Never   Vaping Use    Vaping Use: Never used   Substance and Sexual Activity    Alcohol use: Not Currently      Comment: rarely    Drug use: Never    Sexual activity: Not Currently     Partners: Male     Comment:     Other Topics Concern    Not on file   Social History Narrative    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      Social Determinants of Health     Financial Resource Strain: Not on file   Food Insecurity: Not on file   Transportation Needs: Not on file   Physical Activity: Not on file   Stress: Not on file   Social Connections: Not on file   Intimate Partner Violence: Not on file   Housing Stability: Not on file     Social History     Tobacco Use   Smoking Status Never   Smokeless Tobacco Never     Social History     Substance and Sexual Activity   Alcohol Use Not Currently    Comment: rarely       Allergies/Intolerances:  Allergies   Allergen Reactions    Wound Dressing Adhesive      Pt says band-aids cause skin reaction/rash if on for more than 2 days  Paper tape OK           Other Current Medications:    Current Facility-Administered Medications:     cefTRIAXone (Rocephin) syringe 2,000 mg, 2,000 mg, Intravenous, BID, Sofya Johnson M.D.    [Held by provider] calcium carbonate (Tums) chewable tab 1,000 mg, 1,000 mg, Oral, BID, SAURABH PetersonPRosalia    diazePAM (Valium) injection 5 mg, 5 mg, Intravenous, Q4HRS PRN, Dash Parker M.D.    NS infusion, , Intravenous, Continuous, RUY ZavalaORosalia, Last Rate: 83 mL/hr at 03/28/24 0833, New Bag at 03/28/24 0833    haloperidol lactate (Haldol) injection 1 mg, 1 mg, Intravenous, Once PRN, RUY ZavalaO.    amLODIPine (Norvasc) tablet 2.5 mg, 2.5 mg, Oral, Q EVENING, Selwyn Waters M.D., 2.5 mg at 03/27/24 1747    atorvastatin (Lipitor) tablet 20 mg, 20 mg, Oral, Q EVENING, Selwyn Waters M.D., 20 mg at 03/28/24 1725    gabapentin (Neurontin) capsule 300 mg, 300 mg, Oral, BID, Selwyn Waters M.D., 300 mg at 03/29/24 0448    losartan (Cozaar) tablet 50 mg, 50 mg, Oral, DAILY, Selwyn Waters M.D., 50 mg at  "24 0448    [Held by provider] rivaroxaban (Xarelto) tablet 20 mg, 20 mg, Oral, PM MEAL, Selwyn Waters M.D., 20 mg at 24 1748    acetaminophen (Tylenol) tablet 650 mg, 650 mg, Oral, Q6HRS PRN, Selwyn Waters M.D.      Most Recent Vital Signs:  BP (!) 145/78 Comment: RN notified  Pulse 89   Temp 37 °C (98.6 °F) (Temporal)   Resp 20   Ht 1.575 m (5' 2\")   Wt 74 kg (163 lb 2.3 oz)   LMP  (LMP Unknown)   SpO2 94%   BMI 29.84 kg/m²   Temp  Av.8 °C (98.3 °F)  Min: 36.1 °C (97 °F)  Max: 37.7 °C (99.8 °F)    Physical Exam:  General: elderly frail no acute distress  HEENT: NCAT, PERRLA, sclera anicteric  Neck: supple, no lymphadenopathy  Chest: CTAB, unlabored.  Cardiac: RRR,  +MAYUR  Abdomen: non-tender, non-distended  Extremities: sarcopenia  Skin: ecchymosis  Neuro: lethargic reported left hemiparesis      Pertinent Lab Results:  Recent Labs     24  04024  0103 24  0035   WBC 16.3* 19.0* 18.6*      Recent Labs     24  0409 24  0103 24  0035   HEMOGLOBIN 12.0 12.5 11.4*   HEMATOCRIT 35.2* 37.1 34.6*   MCV 86.5 88.8 87.4   MCH 29.5 29.9 28.8   PLATELETCT 187 162* 173         Recent Labs     24  0409 24  0103 24  0035   SODIUM 136 134* 133*   POTASSIUM 3.5* 4.1 3.8   CHLORIDE 106 104 103   CO2 17* 17* 17*   CREATININE 0.60 0.39* 0.51        Recent Labs     24  04024  0035   ALBUMIN 3.0* 2.8*        Pertinent Micro:  Results       Procedure Component Value Units Date/Time    Blood Culture - Draw one from central line and one from peripheral site [[id number]]  (Abnormal) Collected: 24 1425    Order Status: Completed Specimen: Blood from Peripheral Updated: 24 1233     Significant Indicator POS     Source BLD     Site PERIPHERAL     Culture Result Growth detected by Bactec instrument. 2024  12:56  Gram Stain: Gram positive cocci: Possible Streptococcus sp.        Streptococcus gordonii  Susceptibilities in " progress      Narrative:      CALL  Cramer  61 tel. [phone],  CALLED  61 tel. [phone] 03/26/2024, 17:21, RB PERF. RESULTS CALLED TO:  Voalted to Blood Culture Pharmacy, no ID  No site indicated    BLOOD CULTURE [[id number]] Collected: 03/27/24 0409    Order Status: Completed Specimen: Blood from Peripheral Updated: 03/28/24 0842     Significant Indicator NEG     Source BLD     Site PERIPHERAL     Culture Result No Growth  Note: Blood cultures are incubated for 5 days and  are monitored continuously.Positive blood cultures  are called to the RN and reported as soon as  they are identified.      Narrative:      Left Hand    BLOOD CULTURE [[id number]] Collected: 03/27/24 0428    Order Status: Completed Specimen: Blood from Peripheral Updated: 03/28/24 0842     Significant Indicator NEG     Source BLD     Site PERIPHERAL     Culture Result No Growth  Note: Blood cultures are incubated for 5 days and  are monitored continuously.Positive blood cultures  are called to the RN and reported as soon as  they are identified.      Narrative:      Left    Blood Culture - Draw one from central line and one from peripheral site [[id number]]  (Abnormal) Collected: 03/25/24 1540    Order Status: Completed Specimen: Blood from Line Updated: 03/28/24 0520     Significant Indicator POS     Source BLD     Site Peripheral     Culture Result Growth detected by Bactec instrument. 03/28/2024  05:17  Gram Stain: Gram positive cocci: Possible Streptococcus sp.      Narrative:      Left AC    Urine Culture (New) [[id number]]  (Abnormal)  (Susceptibility) Collected: 03/25/24 1550    Order Status: Completed Specimen: Urine Updated: 03/27/24 0727     Significant Indicator POS     Source UR     Site -     Culture Result -      Escherichia coli  >100,000 cfu/mL      Narrative:      Indication for culture:->Emergency Room Patient    Susceptibility       Escherichia coli (1)       Antibiotic Interpretation Microscan   Method Status    Ampicillin  Resistant >16 mcg/mL MILY Final    Amoxicillin/CA Sensitive <=8/4 mcg/mL MILY Final    Ceftriaxone Sensitive <=1 mcg/mL MILY Final    Cefazolin Sensitive 4 mcg/mL MILY Final     Breakpoints when Cefazolin is used for therapy of infections  other than uncomplicated UTIs due to Enterobacterales are as  follows:  MILY and Interpretation:  <=2 S  4 I  >=8 R         Ciprofloxacin Sensitive <=0.25 mcg/mL MILY Final    Cefepime Sensitive <=2 mcg/mL MILY Final    Cefuroxime Sensitive 8 mcg/mL MILY Final    Ampicillin/sulbactam Intermediate 16/8 mcg/mL MILY Final    Tobramycin Sensitive <=2 mcg/mL MILY Final    Nitrofurantoin Sensitive <=32 mcg/mL MILY Final    Gentamicin Sensitive <=2 mcg/mL MILY Final    Levofloxacin Sensitive <=0.5 mcg/mL MILY Final    Minocycline Sensitive <=4 mcg/mL MILY Final    Pip/Tazobactam Sensitive <=8 mcg/mL MILY Final    Trimeth/Sulfa Sensitive <=0.5/9.5 mcg/mL MILY Final    Tigecycline Sensitive <=2 mcg/mL MILY Final                       Urinalysis [[id number]]  (Abnormal) Collected: 03/25/24 1550    Order Status: Completed Specimen: Urine Updated: 03/25/24 1647     Color Yellow     Character Clear     Specific Gravity 1.018     Ph 5.5     Glucose Negative mg/dL      Ketones Negative mg/dL      Protein 100 mg/dL      Bilirubin Negative     Urobilinogen, Urine 0.2     Nitrite Positive     Leukocyte Esterase Trace     Occult Blood Small     Micro Urine Req Microscopic          Blood Culture   Date Value Ref Range Status   09/28/2017 No growth after 5 days of incubation.  Final        Studies:  MRI 3/28  IMPRESSION:   Multiple foci of acute infarction scattered throughout the bilateral cerebral hemispheres, cerebellum and brainstem as described above, suggesting a proximal cardiovascular embolic source.   The largest cluster of acute infarctions is noted in the right frontoparietal region involving the right MCA territory.   Age-related volume loss.    TTE:  CONCLUSIONS  Hyperdynamic left ventricular systolic  function. Visually estimated ejection fraction is 70-75 %.   The right ventricle is mildly dilated. Normal right ventricular systolic function.  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 Mild mitral stenosis. Mean transvalvular gradient is 4 mmHg at a heart rate   of 72 BPM. Mild mitral regurgitation.  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   Right ventricular systolic pressure is estimated to be 34 mmHg   Compared to the prior study on 10/23/2023, mass on mitral valve is more clearly visualized in this study.  Correlate clinically    IMPRESSION:   Mult CVA  Native valve infective endocarditis  -Blood cult +strep gordonii-usually from oral biofilm  -Abnormal MV with mobile mass-seen previously per ECHO  -mult infarcts  S/p fall  Leukocytosis    PLAN:   DC Ancef  Start ceftriaxone CNS dosing  Recommend VINAY-heart block on telemtry-can be deferred if not a surgical candidate  Recommend Palliative for GOC discussion-unclear if surgical candidate  Final antibiotic recommendations per culture results and clinical course    Plan of care discussed with IM Dash Parker M.D.. Will continue to follow    Sofya Johnson M.D.

## 2024-03-29 NOTE — PROGRESS NOTES
Monitor Summary: SR 65-87, ME -0.22, QRS -0.09, QT -0.38, with rare PVC, first degree heart block per strip from the monitor room.

## 2024-03-29 NOTE — THERAPY
"Speech Language Pathology   Daily Treatment     Patient Name: Marry Mathur  AGE:  88 y.o., SEX:  female  Medical Record #: [id number]  Date of Service: 3/29/2024      Precautions:  Precautions: Fall Risk, Swallow Precautions     HPI: 88F admitted on 3/25/24 with AMS, found to have UTI. Code stroke called 3/26 r/t lethargy and L sided weakness, found to have acute stroke per MRI. 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     3/29/24 MRI Brain: Multiple foci of acute infarction scattered throughout the bilateral cerebral hemispheres, cerebellum and brainstem as described above, suggesting a proximal cardiovascular embolic source. The largest cluster of acute infarctions is noted in the right frontoparietal region involving the right MCA territory. Age-related volume loss.    Subjective  Patient seen this date for dysphagia management. No reports of signs of aspiration. Patient remains lethargic with poor level of alertness. Son reports patient's mentation improved in afternoon yesterday.       Assessment  PO presentations of thin liquids (TN0) straw and liquidized solids (LQ3) from lunch tray. Adequate oral bolus acceptance/containment. Complete AP transfer without notable oral bolus residue upon oral inspection. Chewable solids continue to be held r/t impaired mentation/endurance. No cough/throat clear appreciated with PO, including consecutive straw sips TN0. Vocal quality remained stable throughout PO intake. Single swallow completed per bolus. Son agreed to continue on full liquid for \"energy conservation.\" RN updated.       Clinical Impressions  Patient presents with clinically functional oropharyngeal swallow at this time; however, current mentation elevates risk for pharyngeal dysphagia. Continue to recommend modified diet with 1:1 feeding assist r/t impaired endurance. Service will follow up to ensure diet tolerance " "and advance diet as appropriate; cognitive evaluation pending improved endurance for participation.       Recommendations  Diet Consistency: Full/thin liquids  Instrumentation: Instrumental swallow study pending clinical progress  Medication: Crush with applesauce, as appropriate  Supervision: Careful 1:1 hand feeding  Positioning: Fully upright and midline during oral intake  Risk Management : Small bites/sips, Slow rate of intake  Oral Care: Q6h                     SLP Treatment Plan  Treatment Plan: Dysphagia Treatment (cognitive treatment pending)  SLP Frequency: 3x Per Week  Estimated Duration: Until Therapy Goals Met      Anticipated Discharge Needs  Discharge Recommendations: Recommend post-acute placement for additional speech therapy services prior to discharge home  Therapy Recommendations Upon DC: Dysphagia Training, Cognitive-Linguistic Training, Patient / Family / Caregiver Education      Patient / Family Goals  Patient / Family Goal #1: \"I like the ice\"  Goal #1 Outcome: Progressing as expected  Short Term Goals  Short Term Goal # 1: Patient will consume a full/thin liquid diet without overt indicators of airway invasion or decline in pulmonary status.  Goal Outcome # 1: Progressing as expected      Iris Gonzalez, SLP  "

## 2024-03-29 NOTE — DIETARY
Nutrition Update:    Day 4 of admit.  Marry Mathur is a 88 y.o. female with admitting DX of Acute metabolic encephalopathy.     Pt is on full liquids with 1:1 feeds. Pt asleep at bedside, pt's son at bedside able to answer questions. He describes pt eating 50-75% of meals, and is upset meals are coming late (Neuro is last floor to be delivered unfortunately. I did see early trays already in place for pt, so this should not be a continued issue. Discussed food preferences, reports pt is on fulls liquids r/t liquids work better for pt. Will order magic cups BID per discussion as well. Pt had drank 2/3 of Ensure Plus at bedside, likes chocolate so will update in system and does like them per family. Skin tear to buttocks noted, no edema noted. Last BM: 3/26.     Problem: Nutritional:  Goal: Achieve adequate nutritional intake  Description: Patient will consume >25-50% of meals  Outcome: Progressing    Agree with full liquid diet per pt's son's preference. Pt is drinking Ensure Plus, continue BID chocolate only per pt preference. Will add magic cups BID to promote a variety of food options and adequate intake. Will adjust food preferences as discussed with pt's son.     RD following.

## 2024-03-30 ENCOUNTER — HOSPITAL ENCOUNTER (INPATIENT)
Facility: REHABILITATION | Age: 89
End: 2024-03-30
Attending: PHYSICAL MEDICINE & REHABILITATION | Admitting: PHYSICAL MEDICINE & REHABILITATION
Payer: MEDICARE

## 2024-03-30 LAB
ALBUMIN SERPL BCP-MCNC: 2.6 G/DL (ref 3.2–4.9)
ALBUMIN/GLOB SERPL: 0.9 G/DL
ALP SERPL-CCNC: 67 U/L (ref 30–99)
ALT SERPL-CCNC: 55 U/L (ref 2–50)
ANION GAP SERPL CALC-SCNC: 12 MMOL/L (ref 7–16)
AST SERPL-CCNC: 61 U/L (ref 12–45)
BACTERIA BLD CULT: ABNORMAL
BASOPHILS # BLD AUTO: 0.3 % (ref 0–1.8)
BASOPHILS # BLD: 0.04 K/UL (ref 0–0.12)
BILIRUB SERPL-MCNC: 0.4 MG/DL (ref 0.1–1.5)
BUN SERPL-MCNC: 13 MG/DL (ref 8–22)
CALCIUM ALBUM COR SERPL-MCNC: 8.1 MG/DL (ref 8.5–10.5)
CALCIUM SERPL-MCNC: 7 MG/DL (ref 8.5–10.5)
CHLORIDE SERPL-SCNC: 106 MMOL/L (ref 96–112)
CO2 SERPL-SCNC: 17 MMOL/L (ref 20–33)
CREAT SERPL-MCNC: 0.39 MG/DL (ref 0.5–1.4)
EKG IMPRESSION: NORMAL
EOSINOPHIL # BLD AUTO: 0.14 K/UL (ref 0–0.51)
EOSINOPHIL NFR BLD: 1.2 % (ref 0–6.9)
ERYTHROCYTE [DISTWIDTH] IN BLOOD BY AUTOMATED COUNT: 47.6 FL (ref 35.9–50)
ETEST SENSITIVITY ETEST: NORMAL
GFR SERPLBLD CREATININE-BSD FMLA CKD-EPI: 96 ML/MIN/1.73 M 2
GLOBULIN SER CALC-MCNC: 2.9 G/DL (ref 1.9–3.5)
GLUCOSE SERPL-MCNC: 114 MG/DL (ref 65–99)
HCT VFR BLD AUTO: 33.7 % (ref 37–47)
HGB BLD-MCNC: 11.3 G/DL (ref 12–16)
IMM GRANULOCYTES # BLD AUTO: 0.04 K/UL (ref 0–0.11)
IMM GRANULOCYTES NFR BLD AUTO: 0.3 % (ref 0–0.9)
LYMPHOCYTES # BLD AUTO: 0.9 K/UL (ref 1–4.8)
LYMPHOCYTES NFR BLD: 7.8 % (ref 22–41)
MCH RBC QN AUTO: 29.9 PG (ref 27–33)
MCHC RBC AUTO-ENTMCNC: 33.5 G/DL (ref 32.2–35.5)
MCV RBC AUTO: 89.2 FL (ref 81.4–97.8)
MONOCYTES # BLD AUTO: 0.91 K/UL (ref 0–0.85)
MONOCYTES NFR BLD AUTO: 7.9 % (ref 0–13.4)
NEUTROPHILS # BLD AUTO: 9.44 K/UL (ref 1.82–7.42)
NEUTROPHILS NFR BLD: 82.5 % (ref 44–72)
NRBC # BLD AUTO: 0 K/UL
NRBC BLD-RTO: 0 /100 WBC (ref 0–0.2)
PLATELET # BLD AUTO: 206 K/UL (ref 164–446)
PMV BLD AUTO: 10.5 FL (ref 9–12.9)
POTASSIUM SERPL-SCNC: 3.5 MMOL/L (ref 3.6–5.5)
PROT SERPL-MCNC: 5.5 G/DL (ref 6–8.2)
RBC # BLD AUTO: 3.78 M/UL (ref 4.2–5.4)
SIGNIFICANT IND 70042: ABNORMAL
SIGNIFICANT IND 70042: ABNORMAL
SITE SITE: ABNORMAL
SITE SITE: ABNORMAL
SODIUM SERPL-SCNC: 135 MMOL/L (ref 135–145)
SOURCE SOURCE: ABNORMAL
SOURCE SOURCE: ABNORMAL
WBC # BLD AUTO: 11.5 K/UL (ref 4.8–10.8)

## 2024-03-30 PROCEDURE — 93005 ELECTROCARDIOGRAM TRACING: CPT

## 2024-03-30 PROCEDURE — 700111 HCHG RX REV CODE 636 W/ 250 OVERRIDE (IP): Performed by: INTERNAL MEDICINE

## 2024-03-30 PROCEDURE — 36415 COLL VENOUS BLD VENIPUNCTURE: CPT

## 2024-03-30 PROCEDURE — 80053 COMPREHEN METABOLIC PANEL: CPT

## 2024-03-30 PROCEDURE — 700102 HCHG RX REV CODE 250 W/ 637 OVERRIDE(OP): Performed by: STUDENT IN AN ORGANIZED HEALTH CARE EDUCATION/TRAINING PROGRAM

## 2024-03-30 PROCEDURE — 770020 HCHG ROOM/CARE - TELE (206)

## 2024-03-30 PROCEDURE — A9270 NON-COVERED ITEM OR SERVICE: HCPCS | Mod: JZ | Performed by: STUDENT IN AN ORGANIZED HEALTH CARE EDUCATION/TRAINING PROGRAM

## 2024-03-30 PROCEDURE — 99232 SBSQ HOSP IP/OBS MODERATE 35: CPT | Performed by: STUDENT IN AN ORGANIZED HEALTH CARE EDUCATION/TRAINING PROGRAM

## 2024-03-30 PROCEDURE — 93010 ELECTROCARDIOGRAM REPORT: CPT | Performed by: INTERNAL MEDICINE

## 2024-03-30 PROCEDURE — 700101 HCHG RX REV CODE 250: Performed by: INTERNAL MEDICINE

## 2024-03-30 PROCEDURE — 99233 SBSQ HOSP IP/OBS HIGH 50: CPT | Performed by: INTERNAL MEDICINE

## 2024-03-30 PROCEDURE — 700105 HCHG RX REV CODE 258: Performed by: INTERNAL MEDICINE

## 2024-03-30 PROCEDURE — 85025 COMPLETE CBC W/AUTO DIFF WBC: CPT

## 2024-03-30 PROCEDURE — 99223 1ST HOSP IP/OBS HIGH 75: CPT | Performed by: PHYSICAL MEDICINE & REHABILITATION

## 2024-03-30 PROCEDURE — A9270 NON-COVERED ITEM OR SERVICE: HCPCS | Performed by: STUDENT IN AN ORGANIZED HEALTH CARE EDUCATION/TRAINING PROGRAM

## 2024-03-30 PROCEDURE — 700102 HCHG RX REV CODE 250 W/ 637 OVERRIDE(OP): Mod: JZ | Performed by: STUDENT IN AN ORGANIZED HEALTH CARE EDUCATION/TRAINING PROGRAM

## 2024-03-30 RX ORDER — POTASSIUM CHLORIDE 20 MEQ/1
40 TABLET, EXTENDED RELEASE ORAL ONCE
Status: COMPLETED | OUTPATIENT
Start: 2024-03-30 | End: 2024-03-30

## 2024-03-30 RX ADMIN — CEFTRIAXONE SODIUM 2000 MG: 10 INJECTION, POWDER, FOR SOLUTION INTRAVENOUS at 04:58

## 2024-03-30 RX ADMIN — SODIUM CHLORIDE: 9 INJECTION, SOLUTION INTRAVENOUS at 21:37

## 2024-03-30 RX ADMIN — CEFTRIAXONE SODIUM 2000 MG: 10 INJECTION, POWDER, FOR SOLUTION INTRAVENOUS at 16:38

## 2024-03-30 RX ADMIN — ATORVASTATIN CALCIUM 20 MG: 20 TABLET, FILM COATED ORAL at 16:38

## 2024-03-30 RX ADMIN — POTASSIUM CHLORIDE 40 MEQ: 1500 TABLET, EXTENDED RELEASE ORAL at 07:47

## 2024-03-30 RX ADMIN — LOSARTAN POTASSIUM 50 MG: 50 TABLET, FILM COATED ORAL at 05:05

## 2024-03-30 RX ADMIN — SODIUM CHLORIDE: 9 INJECTION, SOLUTION INTRAVENOUS at 07:49

## 2024-03-30 RX ADMIN — AMLODIPINE BESYLATE 2.5 MG: 5 TABLET ORAL at 16:37

## 2024-03-30 ASSESSMENT — PAIN DESCRIPTION - PAIN TYPE
TYPE: ACUTE PAIN
TYPE: ACUTE PAIN

## 2024-03-30 ASSESSMENT — ENCOUNTER SYMPTOMS
SHORTNESS OF BREATH: 0
SENSORY CHANGE: 1
MYALGIAS: 0
NAUSEA: 0
FALLS: 1
VOMITING: 0
CHILLS: 0
ABDOMINAL PAIN: 0
DEPRESSION: 0
WEAKNESS: 1
HEADACHES: 0
FEVER: 0

## 2024-03-30 ASSESSMENT — FIBROSIS 4 INDEX: FIB4 SCORE: 3.47

## 2024-03-30 NOTE — PROGRESS NOTES
Hospital Medicine Daily Progress Note    Date of Service  3/30/2024    Chief Complaint  Marry Mathur is a 88 y.o. female admitted 3/25/2024 with altered mental status    Hospital Course  88 y.o. female who presented 3/25/2024 with altered mental status.  History limited as patient is confused at bedside.  Patient reports that she went to the restroom and caught her foot on an object, and had been lying down on the ground for a few minutes.  She was too weak to get up.  Per EMS, neighbors checked on patient and found her on the ground, unclear of how long she was actually down on the ground.  She was then brought to ER for evaluation.     In ER, patient found to have normal vital signs.  Pertinent labs include prerenal azotemia, .  UA showing many bacteria, elevated white blood cell count/nitrite.  CT head showing suspected age-indeterminate infarcts.  Chest x-ray showed no acute findings.    Interval Problem Update  No acute events overnight.  Patient awake, alert this morning. Son feeding her breakfast.  Continue ceftriaxone. Repeat blood cultures NGTD.  Plan for VINAY on Monday.  Holding anticoagulation for now per neurology.  WBC improved to 11.5k today.      I have discussed this patient's plan of care and discharge plan at IDT rounds today with Case Management, Nursing, Nursing leadership, and other members of the IDT team.    Consultants/Specialty  General Neurology   Vascular Neurology    Code Status  Full Code    Disposition  The patient is not medically cleared for discharge to home or a post-acute facility.      I have placed the appropriate orders for post-discharge needs.    Review of Systems  Review of Systems   Constitutional:  Positive for malaise/fatigue. Negative for chills and fever.   Respiratory:  Negative for shortness of breath.    Cardiovascular:  Negative for chest pain.   Gastrointestinal:  Negative for abdominal pain, nausea and vomiting.   Musculoskeletal:  Positive for  falls. Negative for myalgias.   Skin:  Negative for rash.   Neurological:  Positive for sensory change and weakness. Negative for headaches.   Psychiatric/Behavioral:  Negative for depression.    All other systems reviewed and are negative.       Physical Exam  Temp:  [36.6 °C (97.8 °F)-37.4 °C (99.3 °F)] 36.6 °C (97.8 °F)  Pulse:  [70-90] 70  Resp:  [18-20] 18  BP: (107-149)/(51-92) 123/58  SpO2:  [96 %-97 %] 96 %    Physical Exam  Vitals and nursing note reviewed.   Constitutional:       General: She is not in acute distress.     Appearance: She is ill-appearing (chronically).      Comments: Son at bedside    HENT:      Head: Normocephalic and atraumatic.      Mouth/Throat:      Pharynx: Oropharynx is clear.   Eyes:      Conjunctiva/sclera: Conjunctivae normal.   Cardiovascular:      Rate and Rhythm: Normal rate and regular rhythm.      Pulses: Normal pulses.   Pulmonary:      Effort: Pulmonary effort is normal. No respiratory distress.      Breath sounds: Normal breath sounds. No wheezing.   Abdominal:      General: Abdomen is flat. There is no distension.      Palpations: Abdomen is soft.      Tenderness: There is no abdominal tenderness.   Musculoskeletal:         General: Normal range of motion.      Cervical back: Neck supple.      Right lower leg: No edema.      Left lower leg: No edema.   Skin:     General: Skin is warm and dry.   Neurological:      General: No focal deficit present.      Mental Status: She is alert.      Cranial Nerves: No cranial nerve deficit.      Motor: Weakness present.   Psychiatric:         Mood and Affect: Mood is anxious.         Fluids    Intake/Output Summary (Last 24 hours) at 3/30/2024 1329  Last data filed at 3/30/2024 0900  Gross per 24 hour   Intake 600 ml   Output --   Net 600 ml           Laboratory  Recent Labs     03/28/24  0103 03/29/24  0035 03/30/24  0129   WBC 19.0* 18.6* 11.5*   RBC 4.18* 3.96* 3.78*   HEMOGLOBIN 12.5 11.4* 11.3*   HEMATOCRIT 37.1 34.6* 33.7*   MCV  88.8 87.4 89.2   MCH 29.9 28.8 29.9   MCHC 33.7 32.9 33.5   RDW 47.9 46.5 47.6   PLATELETCT 162* 173 206   MPV 10.5 10.5 10.5     Recent Labs     03/28/24  0103 03/29/24  0035 03/30/24  0129   SODIUM 134* 133* 135   POTASSIUM 4.1 3.8 3.5*   CHLORIDE 104 103 106   CO2 17* 17* 17*   GLUCOSE 120* 111* 114*   BUN 12 13 13   CREATININE 0.39* 0.51 0.39*   CALCIUM 6.8* 7.1* 7.0*             Recent Labs     03/29/24  0035   TRIGLYCERIDE 61   HDL 31*   LDL 48       Imaging  CT-HEAD W/O   Final Result      1. Stable patchy areas of decreased attenuation at the gray-white matter junctions in both cerebral hemispheres and cerebellar hemispheres when compared with the prior MRI of the brain performed on 3/28/2024.   2. No hemorrhagic transformation.   3. No other acute findings.         EC-ECHOCARDIOGRAM COMPLETE W/O CONT   Final Result      MR-BRAIN-WITH & W/O   Final Result         Multiple foci of acute infarction scattered throughout the bilateral cerebral hemispheres, cerebellum and brainstem as described above, suggesting a proximal cardiovascular embolic source.      The largest cluster of acute infarctions is noted in the right frontoparietal region involving the right MCA territory.      Age-related volume loss.      CT-CTA NECK WITH & W/O-POST PROCESSING   Final Result      No focal high-grade stenosis, dissection or occlusion of the cervical carotid or vertebral arteries.      CT-CTA HEAD WITH & W/O-POST PROCESS   Final Result      1.  Short segment decreased enhancement in the RIGHT superior sylvian division of middle superior artery, M3 segment, focal stenosis versus nonocclusive clot.   2.  No intracranial aneurysm or abrupt large vessel cut off.      CT-HEAD W/O   Final Result      1.  Hypoattenuating foci in the bilateral cerebellum which are indeterminant. They could be subacute infarcts versus other. Recommend brain MRI workup.   2.  Redemonstration of a small focus of low-attenuation in the left parietal deep  and subcortical white matter. Possibly gliosis versus subacute infarct.   3.  Findings are stable since the noncontrast head CT performed one day prior.         CT-HEAD W/O   Final Result      1.  Small, rounded hypodense areas of the bilateral cerebellar hemispheres. These may represent age-indeterminate infarcts. Less likely this could represent edema related to metastatic disease.   2.  There is a small area of loss of gray-white differentiation in the left parietal lobe, possibly an age-indeterminate infarct.   3.  These findings can be further evaluated with contrast-enhanced MRI as indicated.   4.  Cerebral atrophy.   5.  White matter lucencies most consistent with      DX-CHEST-PORTABLE (1 VIEW)   Final Result      No acute findings.           Assessment/Plan  * Acute metabolic encephalopathy- (present on admission)  Assessment & Plan  Patient presents with confusion.  UA pertinent for UTI, likely culprit for encephalopathy.  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, cardiology consulted, plan for VINAY Monday  improving    Stroke (cerebrum) (HCC)  Assessment & Plan  MRI brain confirms multiple foci of acute infarction bilaterally concerning for cardioembolic strokes  Possible septic emboli from endocarditis  Patient with hx of protein S deficiency and DVT, on anticoagulation at home, currently AC is held  Neurology following  ID and cardiology consulted    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, based on echo showing 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 in setting of positive blood cultures  MRI brain with multiple foci of acute infarcts concerning  for cardioembolic stroke  On ceftriaxone  Following blood cultures  ID, neurology, cardiology following  VINAY on Monday    Acute cystitis without hematuria  Assessment & Plan  Continue ancef  Urine culture with E coli    Positive blood culture  Assessment & Plan  Blood cultures 3/25 with strep species  Repeat blood cultures 3/27 taken  Urine culture positive for E coli    S/P TAVR (transcatheter aortic valve replacement)- (present on admission)  Assessment & Plan  Performed in September 2023    Protein S deficiency (HCC)- (present on admission)  Assessment & Plan  Holding home xarelto    History of recurrent deep vein thrombosis (DVT)- (present on admission)  Assessment & Plan  On Xarelto at home  Hold AC for now given strokes    ACP (advance care planning)  Assessment & Plan  16 minutes spent discussing goals of care with patient's son (Nando [phone]) via telephone.  He was explained about patient's current clinical condition and treatment plan.  He states that patient was confused when he was on the phone with her this morning, and that she did not recognize who he was.  He spoke with her in the evening yesterday and patient appeared to be at baseline.  He was asked about CODE STATUS, as he is power of , he states that he would like patient to be full code and to have everything done, including chest compressions and intubation.  He has a flight here tomorrow afternoon    Other hyperlipidemia- (present on admission)  Assessment & Plan  Lipitor    Hypertension  Assessment & Plan  Restart         VTE prophylaxis: SCDs

## 2024-03-30 NOTE — DISCHARGE PLANNING
Physiatry consult complete.  Marry currently does not meet criteria for IPR level of care.    [facility] no longer following    Please reach back out if medical management is requested

## 2024-03-30 NOTE — DISCHARGE PLANNING
[facility] Transitional Care Coordination    Referral from:  Elena  Insurance Provider on Facesheet:MCR  Potential Rehab Diagnosis: CVA    Chart review indicates patient may have on going medical management and may have therapy needs to possibly meet inpatient rehab facility criteria with the goal of returning to community.    D/C support: son     Physiatry consultation forwarded per protocol.     [facility] following for post acute care         Thank you for the referral.

## 2024-03-30 NOTE — PROGRESS NOTES
Patient converted to a-flutter from from sinus rhythm with in & out 2nd degree type 1 HB per telemonitor,informed Letty Hart on call JUSTON.   Render Postcare In The Note: No Quadrants Reporting?: 0 Mid-Level Procedure Text (C): After obtaining clear surgical margins the patient was sent to a mid-level provider for surgical repair.  The patient understands they will receive post-surgical care and follow-up from the mid-level provider. Bcc Macronodular Histology Text: Large basaloid islands with peripheral pallisading with clefting from the stroma. Mucin present. Epidermal Sutures: 4-0 Prolene Wound Care: Petrolatum Special Stains Stage 4 - Results: Base On Clearance Noted Above Complex Repair Preamble Text (Leave Blank If You Do Not Want): Extensive wide undermining was performed. Split-Thickness Skin Graft Text: The defect edges were debeveled with a #15 scalpel blade.  Given the location of the defect, shape of the defect and the proximity to free margins a split thickness skin graft was deemed most appropriate.  Using a sterile surgical marker, the primary defect shape was transferred to the donor site. The split thickness graft was then harvested.  The skin graft was then placed in the primary defect and oriented appropriately. Scc Poorly Differentiated Histology Text: Nests of atypical poorly-differentiated squamous epithelial cells invading the dermis. Staged Advancement Flap Text: The defect edges were debeveled with a #15 scalpel blade.  Given the location of the defect, shape of the defect and the proximity to free margins a staged advancement flap was deemed most appropriate.  Using a sterile surgical marker, an appropriate advancement flap was drawn incorporating the defect and placing the expected incisions within the relaxed skin tension lines where possible. The area thus outlined was incised deep to adipose tissue with a #15 scalpel blade.  The skin margins were undermined to an appropriate distance in all directions utilizing iris scissors. Tissue Cultured Epidermal Autograft Text: The defect edges were debeveled with a #15 scalpel blade.  Given the location of the defect, shape of the defect and the proximity to free margins a tissue cultured epidermal autograft was deemed most appropriate.  The graft was then trimmed to fit the size of the defect.  The graft was then placed in the primary defect and oriented appropriately. Complex Repair And Graft Additional Text (Will Appearing After The Standard Complex Repair Text): The complex repair was not sufficient to completely close the primary defect. The remaining additional defect was repaired with the graft mentioned below. Detail Level: Detailed Show Surgical Defect Variables In The Stage Tabs: Yes Repair Anesthesia Method: local infiltration Stage 4: Additional Anesthesia Type: 1% lidocaine with epinephrine Tarsorrhaphy Text: A tarsorrhaphy was performed using Frost sutures. Mid-Level Procedure Text (F): After obtaining clear surgical margins the patient was sent to a mid-level provider for surgical repair.  The patient understands they will receive post-surgical care and follow-up from the mid-level provider. O-L Flap Text: The defect edges were debeveled with a #15 scalpel blade.  Given the location of the defect, shape of the defect and the proximity to free margins an O-L flap was deemed most appropriate.  Using a sterile surgical marker, an appropriate advancement flap was drawn incorporating the defect and placing the expected incisions within the relaxed skin tension lines where possible.    The area thus outlined was incised deep to adipose tissue with a #15 scalpel blade.  The skin margins were undermined to an appropriate distance in all directions utilizing iris scissors. Island Pedicle Flap-Requiring Vessel Identification Text: The defect edges were debeveled with a #15 scalpel blade.  Given the location of the defect, shape of the defect and the proximity to free margins an island pedicle advancement flap was deemed most appropriate.  Using a sterile surgical marker, an appropriate advancement flap was drawn, based on the axial vessel mentioned above, incorporating the defect, outlining the appropriate donor tissue and placing the expected incisions within the relaxed skin tension lines where possible.    The area thus outlined was incised deep to adipose tissue with a #15 scalpel blade.  The skin margins were undermined to an appropriate distance in all directions around the primary defect and laterally outward around the island pedicle utilizing iris scissors.  There was minimal undermining beneath the pedicle flap. Helical Rim Advancement Flap Text: The defect edges were debeveled with a #15 blade scalpel.  Given the location of the defect and the proximity to free margins (helical rim) a double helical rim advancement flap was deemed most appropriate.  Using a sterile surgical marker, the appropriate advancement flaps were drawn incorporating the defect and placing the expected incisions between the helical rim and antihelix where possible.  The area thus outlined was incised through and through with a #15 scalpel blade.  With a skin hook and iris scissors, the flaps were gently and sharply undermined and freed up. Crescentic Advancement Flap Text: The defect edges were debeveled with a #15 scalpel blade.  Given the location of the defect and the proximity to free margins a crescentic advancement flap was deemed most appropriate.  Using a sterile surgical marker, the appropriate advancement flap was drawn incorporating the defect and placing the expected incisions within the relaxed skin tension lines where possible.    The area thus outlined was incised deep to adipose tissue with a #15 scalpel blade.  The skin margins were undermined to an appropriate distance in all directions utilizing iris scissors. Tumor Depth: Less than 6mm from granular layer and no invasion beyond the subcutaneous fat Referred To Oculoplastics For Closure Text (Leave Blank If You Do Not Want): After obtaining clear surgical margins the patient was sent to oculoplastics for surgical repair.  The patient understands they will receive post-surgical care and follow-up from the referring physician's office. Otolaryngologist Procedure Text (C): After obtaining clear surgical margins the patient was sent to otolaryngology for surgical repair.  The patient understands they will receive post-surgical care and follow-up from the referring physician's office. Depth Of Tumor Invasion (For Histology): tumor not visualized Rotation Flap Text: The defect edges were debeveled with a #15 scalpel blade.  Given the location of the defect, shape of the defect and the proximity to free margins a rotation flap was deemed most appropriate.  Using a sterile surgical marker, an appropriate rotation flap was drawn incorporating the defect and placing the expected incisions within the relaxed skin tension lines where possible.    The area thus outlined was incised deep to adipose tissue with a #15 scalpel blade.  The skin margins were undermined to an appropriate distance in all directions utilizing iris scissors. Provider Procedure Text (F): After obtaining clear surgical margins the defect was repaired by another provider. Suturegard Retention Suture: 2-0 Nylon Area M Indication Text: Tumors in this location are included in Area M (cheek, forehead, scalp, neck, jawline and pretibial skin).  Mohs surgery is indicated for tumors in these anatomic locations. Consent 1/Introductory Paragraph: The rationale for Mohs was explained to the patient and consent was obtained. The risks, benefits and alternatives to therapy were discussed in detail. Specifically, the risks of infection, scarring, bleeding, prolonged wound healing, incomplete removal, allergy to anesthesia, nerve injury and recurrence were addressed. Prior to the procedure, the treatment site was clearly identified and confirmed by the patient. All components of Universal Protocol/PAUSE Rule completed. Otolaryngologist Procedure Text (F): After obtaining clear surgical margins the patient was sent to otolaryngology for surgical repair.  The patient understands they will receive post-surgical care and follow-up from the referring physician's office. Incidental Squamous Cell Carcinoma In Situ Histology Text: Incidentally, there is full-thickness glassy keratinocyte atypia with a wind-blown arrangement within the epidermis, consistent with incidental squamous cell carcinoma in-situ. Nasal Turnover Hinge Flap Text: The defect edges were debeveled with a #15 scalpel blade.  Given the size, depth, location of the defect and the defect being full thickness a nasal turnover hinge flap was deemed most appropriate.  Using a sterile surgical marker, an appropriate hinge flap was drawn incorporating the defect. The area thus outlined was incised with a #15 scalpel blade. The flap was designed to recreate the nasal mucosal lining and the alar rim. The skin margins were undermined to an appropriate distance in all directions utilizing iris scissors. Oculoplastic Surgeon Procedure Text (B): After obtaining clear surgical margins the patient was sent to oculoplastics for surgical repair.  The patient understands they will receive post-surgical care and follow-up from the referring physician's office. Graft Donor Site Bandage (Optional-Leave Blank If You Don't Want In Note): Steri-strips and a pressure bandage were applied to the donor site. Presence Of Scar Tissue (For Histology): absent Manual Repair Warning Statement: We plan on removing the manually selected variable below in favor of our much easier automatic structured text blocks found in the previous tab. We decided to do this to help make the flow better and give you the full power of structured data. Manual selection is never going to be ideal in our platform and I would encourage you to avoid using manual selection from this point on, especially since I will be sunsetting this feature. It is important that you do one of two things with the customized text below. First, you can save all of the text in a word file so you can have it for future reference. Second, transfer the text to the appropriate area in the Library tab. Lastly, if there is a flap or graft type which we do not have you need to let us know right away so I can add it in before the variable is hidden. No need to panic, we plan to give you roughly 6 months to make the change. Mohs Histo Method Verbiage: Each section was then chromacoded and processed in the Mohs lab using the Mohs protocol and submitted for frozen section. Stage 1: Number Of Blocks?: 1 Scc Acantholytic Histology Text: Atypical keratinocytes with glassy eosinophilic cytoplasm and acantholysis, invading the dermis. Partial Purse String (Simple) Text: Given the location of the defect and the characteristics of the surrounding skin a simple purse string closure was deemed most appropriate.  Undermining was performed circumfirentially around the surgical defect.  A purse string suture was then placed and tightened. Wound tension only allowed a partial closure of the circular defect. Merkel Cell Carcinoma Histology Text: Monotonous small basophilic cells with scant cytoplasm and a “crushed” appearance arranged in cords in the mid-dermis around collagen, and tightly packed monotonous small basophilic cells with scant cytoplasm arranged sheets in the deep dermis, surrounding adnexal structures and the subcutis. Purse String (Simple) Text: Given the location of the defect and the characteristics of the surrounding skin a purse string closure was deemed most appropriate.  Undermining was performed circumfirentially around the surgical defect.  A purse string suture was then placed and tightened. Consent (Scalp)/Introductory Paragraph: The rationale for Mohs was explained to the patient and consent was obtained. The risks, benefits and alternatives to therapy were discussed in detail. Specifically, the risks of changes in hair growth pattern secondary to repair, infection, scarring, bleeding, prolonged wound healing, incomplete removal, allergy to anesthesia, nerve injury and recurrence were addressed. Prior to the procedure, the treatment site was clearly identified and confirmed by the patient. All components of Universal Protocol/PAUSE Rule completed. Keystone Flap Text: The defect edges were debeveled with a #15 scalpel blade.  Given the location of the defect, shape of the defect a keystone flap was deemed most appropriate.  Using a sterile surgical marker, an appropriate keystone flap was drawn incorporating the defect, outlining the appropriate donor tissue and placing the expected incisions within the relaxed skin tension lines where possible. The area thus outlined was incised deep to adipose tissue with a #15 scalpel blade.  The skin margins were undermined to an appropriate distance in all directions around the primary defect and laterally outward around the flap utilizing iris scissors. Mart-1 - Positive Histology Text: MART-1 staining demonstrates areas of higher density and clustering of melanocytes with Pagetoid spread upwards within the epidermis. The surgical margins are positive for tumor cells. Alternatives Discussed Intro (Do Not Add Period): I discussed alternative treatments to Mohs surgery and specifically discussed the risks and benefits of Melolabial Interpolation Flap Text: A decision was made to reconstruct the defect utilizing an interpolation axial flap and a staged reconstruction.  A telfa template was made of the defect.  This telfa template was then used to outline the melolabial interpolation flap.  The donor area for the pedicle flap was then injected with anesthesia.  The flap was excised through the skin and subcutaneous tissue down to the layer of the underlying musculature.  The pedicle flap was carefully excised within this deep plane to maintain its blood supply.  The edges of the donor site were undermined.   The donor site was closed in a primary fashion.  The pedicle was then rotated into position and sutured.  Once the tube was sutured into place, adequate blood supply was confirmed with blanching and refill.  The pedicle was then wrapped with xeroform gauze and dressed appropriately with a telfa and gauze bandage to ensure continued blood supply and protect the attached pedicle. Asc Procedure Text (D): After obtaining clear surgical margins the patient was sent to an ASC for surgical repair.  The patient understands they will receive post-surgical care and follow-up from the ASC physician. Location Indication Override (Is Already Calculated Based On Selected Body Location): Area H Post-Care Instructions: I reviewed with the patient in detail post-care instructions. Patient is not to engage in any heavy lifting, exercise, or swimming for the next 14 days. Should the patient develop any fevers, chills, bleeding, severe pain patient will contact the office immediately. Helical Rim Text: The closure involved the helical rim. Consent (Temporal Branch)/Introductory Paragraph: The rationale for Mohs was explained to the patient and consent was obtained. The risks, benefits and alternatives to therapy were discussed in detail. Specifically, the risks of damage to the temporal branch of the facial nerve, infection, scarring, bleeding, prolonged wound healing, incomplete removal, allergy to anesthesia, and recurrence were addressed. Prior to the procedure, the treatment site was clearly identified and confirmed by the patient. All components of Universal Protocol/PAUSE Rule completed. Nasalis-Muscle-Based Myocutaneous Island Pedicle Flap Text: Using a #15 blade, an incision was made around the donor flap to the level of the nasalis muscle. Wide lateral undermining was then performed in both the subcutaneous plane above the nasalis muscle, and in a submuscular plane just above periosteum. This allowed the formation of a free nasalis muscle axial pedicle (based on the angular artery) which was still attached to the actual cutaneous flap, increasing its mobility and vascular viability. Hemostasis was obtained with pinpoint electrocoagulation. The flap was mobilized into position and the pivotal anchor points positioned and stabilized with buried interrupted sutures. Subcutaneous and dermal tissues were closed in a multilayered fashion with sutures. Tissue redundancies were excised, and the epidermal edges were apposed without significant tension and sutured with sutures. Advancement-Rotation Flap Text: The defect edges were debeveled with a #15 scalpel blade.  Given the location of the defect, shape of the defect and the proximity to free margins an advancement-rotation flap was deemed most appropriate.  Using a sterile surgical marker, an appropriate flap was drawn incorporating the defect and placing the expected incisions within the relaxed skin tension lines where possible. The area thus outlined was incised deep to adipose tissue with a #15 scalpel blade.  The skin margins were undermined to an appropriate distance in all directions utilizing iris scissors. Island Pedicle Flap Text: The defect edges were debeveled with a #15 scalpel blade.  Given the location of the defect, shape of the defect and the proximity to free margins an island pedicle advancement flap was deemed most appropriate.  Using a sterile surgical marker, an appropriate advancement flap was drawn incorporating the defect, outlining the appropriate donor tissue and placing the expected incisions within the relaxed skin tension lines where possible.    The area thus outlined was incised deep to adipose tissue with a #15 scalpel blade.  The skin margins were undermined to an appropriate distance in all directions around the primary defect and laterally outward around the island pedicle utilizing iris scissors.  There was minimal undermining beneath the pedicle flap. Undermining Type: Entire Wound Consent (Lip)/Introductory Paragraph: The rationale for Mohs was explained to the patient and consent was obtained. The risks, benefits and alternatives to therapy were discussed in detail. Specifically, the risks of lip deformity, changes in the oral aperture, infection, scarring, bleeding, prolonged wound healing, incomplete removal, allergy to anesthesia, nerve injury and recurrence were addressed. Prior to the procedure, the treatment site was clearly identified and confirmed by the patient. All components of Universal Protocol/PAUSE Rule completed. Paramedian Forehead Flap Text: A decision was made to reconstruct the defect utilizing an interpolation axial flap and a staged reconstruction.  A telfa template was made of the defect.  This telfa template was then used to outline the paramedian forehead pedicle flap.  The donor area for the pedicle flap was then injected with anesthesia.  The flap was excised through the skin and subcutaneous tissue down to the layer of the underlying musculature.  The pedicle flap was carefully excised within this deep plane to maintain its blood supply.  The edges of the donor site were undermined.   The donor site was closed in a primary fashion.  The pedicle was then rotated into position and sutured.  Once the tube was sutured into place, adequate blood supply was confirmed with blanching and refill.  The pedicle was then wrapped with xeroform gauze and dressed appropriately with a telfa and gauze bandage to ensure continued blood supply and protect the attached pedicle. Incidental Superficial Basal Cell Carcinoma Histology Text: Incidentally, there are multifocal nests of atypical basaloid cells budding from the basal layer of the epidermis, with evidence of clefting from the stroma, consistent with incidental superficial basal cell carcinoma. Burow's Graft Text: The defect edges were debeveled with a #15 scalpel blade.  Given the location of the defect, shape of the defect, the proximity to free margins and the presence of a standing cone deformity a Burow's skin graft was deemed most appropriate. The standing cone was removed and this tissue was then trimmed to the shape of the primary defect. The adipose tissue was also removed until only dermis and epidermis were left.  The skin margins of the secondary defect were undermined to an appropriate distance in all directions utilizing iris scissors.  The secondary defect was closed with interrupted buried subcutaneous sutures.  The skin edges were then re-apposed with running  sutures.  The skin graft was then placed in the primary defect and oriented appropriately. Initial Size Of Lesion: 0.8 Consent (Near Eyelid Margin)/Introductory Paragraph: The rationale for Mohs was explained to the patient and consent was obtained. The risks, benefits and alternatives to therapy were discussed in detail. Specifically, the risks of ectropion or eyelid deformity, infection, scarring, bleeding, prolonged wound healing, incomplete removal, allergy to anesthesia, nerve injury and recurrence were addressed. Prior to the procedure, the treatment site was clearly identified and confirmed by the patient. All components of Universal Protocol/PAUSE Rule completed. Peng Advancement Flap Text: The defect edges were debeveled with a #15 scalpel blade.  Given the location of the defect, shape of the defect and the proximity to free margins a Peng advancement flap was deemed most appropriate.  Using a sterile surgical marker, an appropriate advancement flap was drawn incorporating the defect and placing the expected incisions within the relaxed skin tension lines where possible. The area thus outlined was incised deep to adipose tissue with a #15 scalpel blade.  The skin margins were undermined to an appropriate distance in all directions utilizing iris scissors. Tumor Debulked?: curette Information: Selecting Yes will display possible errors in your note based on the variables you have selected. This validation is only offered as a suggestion for you. PLEASE NOTE THAT THE VALIDATION TEXT WILL BE REMOVED WHEN YOU FINALIZE YOUR NOTE. IF YOU WANT TO FAX A PRELIMINARY NOTE YOU WILL NEED TO TOGGLE THIS TO 'NO' IF YOU DO NOT WANT IT IN YOUR FAXED NOTE. Trilobed Flap Text: The defect edges were debeveled with a #15 scalpel blade.  Given the location of the defect and the proximity to free margins a trilobed flap was deemed most appropriate.  Using a sterile surgical marker, an appropriate trilobed flap drawn around the defect.    The area thus outlined was incised deep to adipose tissue with a #15 scalpel blade.  The skin margins were undermined to an appropriate distance in all directions utilizing iris scissors. X Size Of Lesion In Cm (Optional): 0.7 Follicular Atypia Histology Text: Dysplastic keratinocytes effacing follicular structures. Scc Well Differentiated Histology Text: Nests of atypical well-differentiated keratinocytes with glassy eosinophilic cytoplasm, and several horn cysts invading the dermis Plastic Surgeon Procedure Text (A): After obtaining clear surgical margins the patient was sent to plastics for surgical repair.  The patient understands they will receive post-surgical care and follow-up from the referring physician's office. Plastic Surgeon Procedure Text (E): After obtaining clear surgical margins the patient was sent to plastics for surgical repair.  The patient understands they will receive post-surgical care and follow-up from the referring physician's office. Scc In Situ Histology Text: Full-thickness glassy keratinocyte atypia with a wind-blown arrangement within the epidermis, loss of the granular layer and overlying parakaratosis. Estimated Blood Loss (Cc): minimal Partial Purse String (Intermediate) Text: Given the location of the defect and the characteristics of the surrounding skin an intermediate purse string closure was deemed most appropriate.  Undermining was performed circumfirentially around the surgical defect.  A purse string suture was then placed and tightened. Wound tension only allowed a partial closure of the circular defect. Suturegard Body: The suture ends were repeatedly re-tightened and re-clamped to achieve the desired tissue expansion. No Repair - Repaired With Adjacent Surgical Defect Text (Leave Blank If You Do Not Want): After obtaining clear surgical margins the defect was repaired concurrently with another surgical defect which was in close approximation. Intermediate Repair Preamble Text (Leave Blank If You Do Not Want): Undermining was performed with sharp dissection. Consent Type: Consent 1 (Standard) Double O-Z Flap Text: The defect edges were debeveled with a #15 scalpel blade.  Given the location of the defect, shape of the defect and the proximity to free margins a Double O-Z flap was deemed most appropriate.  Using a sterile surgical marker, an appropriate transposition flap was drawn incorporating the defect and placing the expected incisions within the relaxed skin tension lines where possible. The area thus outlined was incised deep to adipose tissue with a #15 scalpel blade.  The skin margins were undermined to an appropriate distance in all directions utilizing iris scissors. Bilobed Flap Text: The defect edges were debeveled with a #15 scalpel blade.  Given the location of the defect and the proximity to free margins a bilobe flap was deemed most appropriate.  Using a sterile surgical marker, an appropriate bilobe flap drawn around the defect.    The area thus outlined was incised deep to adipose tissue with a #15 scalpel blade.  The skin margins were undermined to an appropriate distance in all directions utilizing iris scissors. Area L Indication Text: Tumors in this location are included in Area L (trunk and extremities).  Mohs surgery is indicated for larger tumors, or tumors with aggressive histologic features, in these anatomic locations. Mastoid Interpolation Flap Text: A decision was made to reconstruct the defect utilizing an interpolation axial flap and a staged reconstruction.  A telfa template was made of the defect.  This telfa template was then used to outline the mastoid interpolation flap.  The donor area for the pedicle flap was then injected with anesthesia.  The flap was excised through the skin and subcutaneous tissue down to the layer of the underlying musculature.  The pedicle flap was carefully excised within this deep plane to maintain its blood supply.  The edges of the donor site were undermined.   The donor site was closed in a primary fashion.  The pedicle was then rotated into position and sutured.  Once the tube was sutured into place, adequate blood supply was confirmed with blanching and refill.  The pedicle was then wrapped with xeroform gauze and dressed appropriately with a telfa and gauze bandage to ensure continued blood supply and protect the attached pedicle. Burow's Advancement Flap Text: The defect edges were debeveled with a #15 scalpel blade.  Given the location of the defect and the proximity to free margins a Burow's advancement flap was deemed most appropriate.  Using a sterile surgical marker, the appropriate advancement flap was drawn incorporating the defect and placing the expected incisions within the relaxed skin tension lines where possible.    The area thus outlined was incised deep to adipose tissue with a #15 scalpel blade.  The skin margins were undermined to an appropriate distance in all directions utilizing iris scissors. Melolabial Transposition Flap Text: The defect edges were debeveled with a #15 scalpel blade.  Given the location of the defect and the proximity to free margins a melolabial flap was deemed most appropriate.  Using a sterile surgical marker, an appropriate melolabial transposition flap was drawn incorporating the defect.    The area thus outlined was incised deep to adipose tissue with a #15 scalpel blade.  The skin margins were undermined to an appropriate distance in all directions utilizing iris scissors. W Plasty Text: The lesion was extirpated to the level of the fat with a #15 scalpel blade.  Given the location of the defect, shape of the defect and the proximity to free margins a W-plasty was deemed most appropriate for repair.  Using a sterile surgical marker, the appropriate transposition arms of the W-plasty were drawn incorporating the defect and placing the expected incisions within the relaxed skin tension lines where possible.    The area thus outlined was incised deep to adipose tissue with a #15 scalpel blade.  The skin margins were undermined to an appropriate distance in all directions utilizing iris scissors.  The opposing transposition arms were then transposed into place in opposite direction and anchored with interrupted buried subcutaneous sutures. Double O-Z Plasty Text: The defect edges were debeveled with a #15 scalpel blade.  Given the location of the defect, shape of the defect and the proximity to free margins a Double O-Z plasty (double transposition flap) was deemed most appropriate.  Using a sterile surgical marker, the appropriate transposition flaps were drawn incorporating the defect and placing the expected incisions within the relaxed skin tension lines where possible. The area thus outlined was incised deep to adipose tissue with a #15 scalpel blade.  The skin margins were undermined to an appropriate distance in all directions utilizing iris scissors.  Hemostasis was achieved with electrocautery.  The flaps were then transposed into place, one clockwise and the other counterclockwise, and anchored with interrupted buried subcutaneous sutures. Malignant Fibrous Histiocytoma/Undifferentiatl Pleomorphic Sarcoma Histology Text: Proliferations of cells with severe pleomorphism, multinucleated giant cells, and storiform cellular pattern. Spiral Flap Text: The defect edges were debeveled with a #15 scalpel blade.  Given the location of the defect, shape of the defect and the proximity to free margins a spiral flap was deemed most appropriate.  Using a sterile surgical marker, an appropriate rotation flap was drawn incorporating the defect and placing the expected incisions within the relaxed skin tension lines where possible. The area thus outlined was incised deep to adipose tissue with a #15 scalpel blade.  The skin margins were undermined to an appropriate distance in all directions utilizing iris scissors. V-Y Flap Text: The defect edges were debeveled with a #15 scalpel blade.  Given the location of the defect, shape of the defect and the proximity to free margins a V-Y flap was deemed most appropriate.  Using a sterile surgical marker, an appropriate advancement flap was drawn incorporating the defect and placing the expected incisions within the relaxed skin tension lines where possible.    The area thus outlined was incised deep to adipose tissue with a #15 scalpel blade.  The skin margins were undermined to an appropriate distance in all directions utilizing iris scissors. Dorsal Nasal Flap Text: The defect edges were debeveled with a #15 scalpel blade.  Given the location of the defect and the proximity to free margins a dorsal nasal flap was deemed most appropriate.  Using a sterile surgical marker, an appropriate dorsal nasal flap was drawn around the defect.    The area thus outlined was incised deep to adipose tissue with a #15 scalpel blade.  The skin margins were undermined to an appropriate distance in all directions utilizing iris scissors. Chonodrocutaneous Helical Advancement Flap Text: The defect edges were debeveled with a #15 scalpel blade.  Given the location of the defect and the proximity to free margins a chondrocutaneous helical advancement flap was deemed most appropriate.  Using a sterile surgical marker, the appropriate advancement flap was drawn incorporating the defect and placing the expected incisions within the relaxed skin tension lines where possible.    The area thus outlined was incised deep to adipose tissue with a #15 scalpel blade.  The skin margins were undermined to an appropriate distance in all directions utilizing iris scissors. Posterior Auricular Interpolation Flap Text: A decision was made to reconstruct the defect utilizing an interpolation axial flap and a staged reconstruction.  A telfa template was made of the defect.  This telfa template was then used to outline the posterior auricular interpolation flap.  The donor area for the pedicle flap was then injected with anesthesia.  The flap was excised through the skin and subcutaneous tissue down to the layer of the underlying musculature.  The pedicle flap was carefully excised within this deep plane to maintain its blood supply.  The edges of the donor site were undermined.   The donor site was closed in a primary fashion.  The pedicle was then rotated into position and sutured.  Once the tube was sutured into place, adequate blood supply was confirmed with blanching and refill.  The pedicle was then wrapped with xeroform gauze and dressed appropriately with a telfa and gauze bandage to ensure continued blood supply and protect the attached pedicle. Mercedes Flap Text: The defect edges were debeveled with a #15 scalpel blade.  Given the location of the defect, shape of the defect and the proximity to free margins a Mercedes flap was deemed most appropriate.  Using a sterile surgical marker, an appropriate advancement flap was drawn incorporating the defect and placing the expected incisions within the relaxed skin tension lines where possible. The area thus outlined was incised deep to adipose tissue with a #15 scalpel blade.  The skin margins were undermined to an appropriate distance in all directions utilizing iris scissors. Sebaceous Carcinoma Histology Text: Lobular dermal aggregates of atypical polyhedral cells consistent with sebocytes, with fibrovascular stroma. Bcc  Nodular Histology Text: Basaloid islands with peripheral pallisading and clefting from the stroma. Mucin present. Staging Info: By selecting yes to the question above you will include information on AJCC 8 tumor staging in your Mohs note. Information on tumor staging will be automatically added for SCCs on the head and neck. AJCC 8 includes tumor size, tumor depth, perineural involvement and bone invasion. Vermilion Border Text: The closure involved the vermilion border. Epidermal Closure Graft Donor Site (Optional): simple interrupted Island Pedicle Flap With Canthal Suspension Text: The defect edges were debeveled with a #15 scalpel blade.  Given the location of the defect, shape of the defect and the proximity to free margins an island pedicle advancement flap was deemed most appropriate.  Using a sterile surgical marker, an appropriate advancement flap was drawn incorporating the defect, outlining the appropriate donor tissue and placing the expected incisions within the relaxed skin tension lines where possible. The area thus outlined was incised deep to adipose tissue with a #15 scalpel blade.  The skin margins were undermined to an appropriate distance in all directions around the primary defect and laterally outward around the island pedicle utilizing iris scissors.  There was minimal undermining beneath the pedicle flap. A suspension suture was placed in the canthal tendon to prevent tension and prevent ectropion. Bilateral Helical Rim Advancement Flap Text: The defect edges were debeveled with a #15 blade scalpel.  Given the location of the defect and the proximity to free margins (helical rim) a bilateral helical rim advancement flap was deemed most appropriate.  Using a sterile surgical marker, the appropriate advancement flaps were drawn incorporating the defect and placing the expected incisions between the helical rim and antihelix where possible.  The area thus outlined was incised through and through with a #15 scalpel blade.  With a skin hook and iris scissors, the flaps were gently and sharply undermined and freed up. Mart-1 - Negative Histology Text: MART-1 staining demonstrates a normal density and pattern of melanocytes along the dermal-epidermal junction. The surgical margins are negative for tumor cells. Cheek-To-Nose Interpolation Flap Text: A decision was made to reconstruct the defect utilizing an interpolation axial flap and a staged reconstruction.  A telfa template was made of the defect.  This telfa template was then used to outline the Cheek-To-Nose Interpolation flap.  The donor area for the pedicle flap was then injected with anesthesia.  The flap was excised through the skin and subcutaneous tissue down to the layer of the underlying musculature.  The interpolation flap was carefully excised within this deep plane to maintain its blood supply.  The edges of the donor site were undermined.   The donor site was closed in a primary fashion.  The pedicle was then rotated into position and sutured.  Once the tube was sutured into place, adequate blood supply was confirmed with blanching and refill.  The pedicle was then wrapped with xeroform gauze and dressed appropriately with a telfa and gauze bandage to ensure continued blood supply and protect the attached pedicle. Why Was The Change Made?: Please Select the Appropriate Response Hemigard Postcare Instructions: The HEMIGARD strips are to remain completely dry for at least 5-7 days. Crescentic Complex Repair Preamble Text (Leave Blank If You Do Not Want): Extensive wide undermining was performed Alar Island Pedicle Flap Text: The defect edges were debeveled with a #15 scalpel blade.  Given the location of the defect, shape of the defect and the proximity to the alar rim an island pedicle advancement flap was deemed most appropriate.  Using a sterile surgical marker, an appropriate advancement flap was drawn incorporating the defect, outlining the appropriate donor tissue and placing the expected incisions within the nasal ala running parallel to the alar rim. The area thus outlined was incised with a #15 scalpel blade.  The skin margins were undermined minimally to an appropriate distance in all directions around the primary defect and laterally outward around the island pedicle utilizing iris scissors.  There was minimal undermining beneath the pedicle flap. Double M-Plasty Intermediate Repair Preamble Text (Leave Blank If You Do Not Want): Undermining was performed with blunt dissection. Graft Donor Site: Right Post Auricular Bcc Superficial Histology Text: Multifocal nests of atypical basaloid cells budding from the basal layer of the epidermis, with evidence of clefting. Date Of Previous Biopsy (Optional): 4/4/22 Rhombic Flap Text: The defect edges were debeveled with a #15 scalpel blade.  Given the location of the defect and the proximity to free margins a rhombic flap was deemed most appropriate.  Using a sterile surgical marker, an appropriate rhombic flap was drawn incorporating the defect.    The area thus outlined was incised deep to adipose tissue with a #15 scalpel blade.  The skin margins were undermined to an appropriate distance in all directions utilizing iris scissors. Surgeon/Pathologist Verbiage (Will Incorporate Name Of Surgeon From Intro If Not Blank): operated in two distinct and integrated capacities as the surgeon and pathologist. Postop Diagnosis: same Scc Histology Text: Nests of atypical keratinocytes with glassy eosinophilic cytoplasm invading the dermis. Hemigard Intro: Due to skin fragility and wound tension, it was decided to use HEMIGARD adhesive retention suture devices to permit a linear closure. The skin was cleaned and dried for a 6cm distance away from the wound. Excessive hair, if present, was removed to allow for adhesion. Additional Epidermal Closure (Optional): bolstering Donor Site Anesthesia Type: same as repair anesthesia Ftsg Text: The defect edges were debeveled with a #15 scalpel blade.  Given the location of the defect, shape of the defect and the proximity to free margins a full thickness skin graft was deemed most appropriate.  Using a sterile surgical marker, the primary defect shape was transferred to the donor site. The area thus outlined was incised deep to adipose tissue with a #15 scalpel blade.  The harvested graft was then trimmed of adipose tissue until only dermis and epidermis was left.  The skin margins of the secondary defect were undermined to an appropriate distance in all directions utilizing iris scissors.  The secondary defect was closed with interrupted buried subcutaneous sutures.  The skin edges were then re-apposed with running  sutures.  The skin graft was then placed in the primary defect and oriented appropriately. O-T Advancement Flap Text: The defect edges were debeveled with a #15 scalpel blade.  Given the location of the defect, shape of the defect and the proximity to free margins an O-T advancement flap was deemed most appropriate.  Using a sterile surgical marker, an appropriate advancement flap was drawn incorporating the defect and placing the expected incisions within the relaxed skin tension lines where possible.    The area thus outlined was incised deep to adipose tissue with a #15 scalpel blade.  The skin margins were undermined to an appropriate distance in all directions utilizing iris scissors. Dressing (No Sutures): dry sterile dressing Cheiloplasty (Less Than 50%) Text: A decision was made to reconstruct the defect with a  cheiloplasty.  The defect was undermined extensively.  Additional obicularis oris muscle was excised with a 15 blade scalpel.  The defect was converted into a full thickness wedge, of less than 50% of the vertical height of the lip, to facilite a better cosmetic result.  Small vessels were then tied off with 5-0 monocyrl. The obicularis oris, superficial fascia, adipose and dermis were then reapproximated.  After the deeper layers were approximated the epidermis was reapproximated with particular care given to realign the vermilion border. Asc Procedure Text (E): After obtaining clear surgical margins the patient was sent to an ASC for surgical repair.  The patient understands they will receive post-surgical care and follow-up from the ASC physician. Closure 4 Information: This tab is for additional flaps and grafts above and beyond our usual structured repairs.  Please note if you enter information here it will not currently bill and you will need to add the billing information manually. Suturegard Intro: Intraoperative tissue expansion was performed, utilizing the SUTUREGARD device, in order to reduce wound tension. Bi-Rhombic Flap Text: The defect edges were debeveled with a #15 scalpel blade.  Given the location of the defect and the proximity to free margins a bi-rhombic flap was deemed most appropriate.  Using a sterile surgical marker, an appropriate rhombic flap was drawn incorporating the defect. The area thus outlined was incised deep to adipose tissue with a #15 scalpel blade.  The skin margins were undermined to an appropriate distance in all directions utilizing iris scissors. Bcc Infiltrative Histology Text: Multiple infiltrating, angulated strands and cords of atypical basaloid cells with surrounding fibroblast-rich stroma. Composite Graft Text: The defect edges were debeveled with a #15 scalpel blade.  Given the location of the defect, shape of the defect, the proximity to free margins and the fact the defect was full thickness a composite graft was deemed most appropriate.  The defect was outline and then transferred to the donor site.  A full thickness graft was then excised from the donor site. The graft was then placed in the primary defect, oriented appropriately and then sutured into place.  The secondary defect was then repaired using a primary closure. Afx Histology Text: Proliferation of pleomorphic spindle cells with mitoses confined to the upper dermis. Retention Suture Bite Size: 3 mm V-Y Plasty Text: The defect edges were debeveled with a #15 scalpel blade.  Given the location of the defect, shape of the defect and the proximity to free margins an V-Y advancement flap was deemed most appropriate.  Using a sterile surgical marker, an appropriate advancement flap was drawn incorporating the defect and placing the expected incisions within the relaxed skin tension lines where possible.    The area thus outlined was incised deep to adipose tissue with a #15 scalpel blade.  The skin margins were undermined to an appropriate distance in all directions utilizing iris scissors. Fibroepithelioma Of Pinkus Histology Text: Elongated, branched, and trabecular strands of basaloid cells extending deep into the dermis. Horn cysts and palisading is seen.  Fibromucinous stroma. Mohs Rapid Report Verbiage: The area of clinically evident tumor was marked with skin marking ink and appropriately hatched.  The initial incision was made following the Mohs approach through the skin.  The specimen was taken to the lab, divided into the necessary number of pieces, chromacoded and processed according to the Mohs protocol.  This was repeated in successive stages until a tumor free defect was achieved. Double Island Pedicle Flap Text: The defect edges were debeveled with a #15 scalpel blade.  Given the location of the defect, shape of the defect and the proximity to free margins a double island pedicle advancement flap was deemed most appropriate.  Using a sterile surgical marker, an appropriate advancement flap was drawn incorporating the defect, outlining the appropriate donor tissue and placing the expected incisions within the relaxed skin tension lines where possible.    The area thus outlined was incised deep to adipose tissue with a #15 scalpel blade.  The skin margins were undermined to an appropriate distance in all directions around the primary defect and laterally outward around the island pedicle utilizing iris scissors.  There was minimal undermining beneath the pedicle flap. Xenograft Text: The defect edges were debeveled with a #15 scalpel blade.  Given the location of the defect, shape of the defect and the proximity to free margins a xenograft was deemed most appropriate.  The graft was then trimmed to fit the size of the defect.  The graft was then placed in the primary defect and oriented appropriately. Full Thickness Lip Wedge Repair (Flap) Text: Given the location of the defect and the proximity to free margins a full thickness wedge repair was deemed most appropriate.  Using a sterile surgical marker, the appropriate repair was drawn incorporating the defect and placing the expected incisions perpendicular to the vermilion border.  The vermilion border was also meticulously outlined to ensure appropriate reapproximation during the repair.  The area thus outlined was incised through and through with a #15 scalpel blade.  The muscularis and dermis were reaproximated with deep sutures following hemostasis. Care was taken to realign the vermilion border before proceeding with the superficial closure.  Once the vermilion was realigned the superfical and mucosal closure was finished. Same Histology In Subsequent Stages Text: The pattern and morphology of the tumor is as described in the first stage. Consent 2/Introductory Paragraph: Mohs surgery was explained to the patient and consent was obtained. The risks, benefits and alternatives to therapy were discussed in detail. Specifically, the risks of infection, scarring, bleeding, prolonged wound healing, incomplete removal, allergy to anesthesia, nerve injury and recurrence were addressed. Prior to the procedure, the treatment site was clearly identified and confirmed by the patient. All components of Universal Protocol/PAUSE Rule completed. Graft Type: Full Thickness Skin Graft S Plasty Text: Given the location and shape of the defect, and the orientation of relaxed skin tension lines, an S-plasty was deemed most appropriate for repair.  Using a sterile surgical marker, the appropriate outline of the S-plasty was drawn, incorporating the defect and placing the expected incisions within the relaxed skin tension lines where possible.  The area thus outlined was incised deep to adipose tissue with a #15 scalpel blade.  The skin margins were undermined to an appropriate distance in all directions utilizing iris scissors. The skin flaps were advanced over the defect.  The opposing margins were then approximated with interrupted buried subcutaneous sutures. Bilobed Transposition Flap Text: The defect edges were debeveled with a #15 scalpel blade.  Given the location of the defect and the proximity to free margins a bilobed transposition flap was deemed most appropriate.  Using a sterile surgical marker, an appropriate bilobe flap drawn around the defect.    The area thus outlined was incised deep to adipose tissue with a #15 scalpel blade.  The skin margins were undermined to an appropriate distance in all directions utilizing iris scissors. Scc Moderately Differentiated Histology Text: Nests of atypical moderately-differentiated keratinocytes with glassy eosinophilic cytoplasm, invading the dermis. Anesthesia Type: 1% lidocaine with 1:100,000 epinephrine and a 1:10 solution of 8.4% sodium bicarbonate O-Z Plasty Text: The defect edges were debeveled with a #15 scalpel blade.  Given the location of the defect, shape of the defect and the proximity to free margins an O-Z plasty (double transposition flap) was deemed most appropriate.  Using a sterile surgical marker, the appropriate transposition flaps were drawn incorporating the defect and placing the expected incisions within the relaxed skin tension lines where possible.    The area thus outlined was incised deep to adipose tissue with a #15 scalpel blade.  The skin margins were undermined to an appropriate distance in all directions utilizing iris scissors.  Hemostasis was achieved with electrocautery.  The flaps were then transposed into place, one clockwise and the other counterclockwise, and anchored with interrupted buried subcutaneous sutures. Wound Check: 6 weeks Dfsp Histology Text: Proliferation of cells with spindled nuclei and scant pale cytoplasm, arranged in a whorled, storiform pattern. Secondary Intention Text (Leave Blank If You Do Not Want): The defect will heal with secondary intention. Subsequent Stages Histo Method Verbiage: Using a similar technique to that described above, a thin layer of tissue was removed from all areas where tumor was visible on the previous stage.  The tissue was again oriented, mapped, dyed, and processed as above. Primary Defect Length In Cm (Final Defect Size - Required For Flaps/Grafts): 1.4 Graft Cartilage Fenestration Text: The cartilage was fenestrated with a 2mm punch biopsy to help facilitate graft survival and healing. Extramammary Paget's Disease Histology Text: Intraepidermal proliferation of cells with clear cytoplasm, large pleomorphic nuclei and occasional mitoses. An abundance of mucin is observed within cells. Mustarde Flap Text: The defect edges were debeveled with a #15 scalpel blade.  Given the size, depth and location of the defect and the proximity to free margins a Mustarde flap was deemed most appropriate.  Using a sterile surgical marker, an appropriate flap was drawn incorporating the defect. The area thus outlined was incised with a #15 scalpel blade.  The skin margins were undermined to an appropriate distance in all directions utilizing iris scissors. Consent (Ear)/Introductory Paragraph: The rationale for Mohs was explained to the patient and consent was obtained. The risks, benefits and alternatives to therapy were discussed in detail. Specifically, the risks of ear deformity, infection, scarring, bleeding, prolonged wound healing, incomplete removal, allergy to anesthesia, nerve injury and recurrence were addressed. Prior to the procedure, the treatment site was clearly identified and confirmed by the patient. All components of Universal Protocol/PAUSE Rule completed. Interpolation Flap Text: A decision was made to reconstruct the defect utilizing an interpolation axial flap and a staged reconstruction.  A telfa template was made of the defect.  This telfa template was then used to outline the interpolation flap.  The donor area for the pedicle flap was then injected with anesthesia.  The flap was excised through the skin and subcutaneous tissue down to the layer of the underlying musculature.  The interpolation flap was carefully excised within this deep plane to maintain its blood supply.  The edges of the donor site were undermined.   The donor site was closed in a primary fashion.  The pedicle was then rotated into position and sutured.  Once the tube was sutured into place, adequate blood supply was confirmed with blanching and refill.  The pedicle was then wrapped with xeroform gauze and dressed appropriately with a telfa and gauze bandage to ensure continued blood supply and protect the attached pedicle. Mauc Instructions: By selecting yes to the question below the MAUC number will be added into the note.  This will be calculated automatically based on the diagnosis chosen, the size entered, the body zone selected (H,M,L) and the specific indications you chose. You will also have the option to override the Mohs AUC if you disagree with the automatically calculated number and this option is found in the Case Summary tab. Hatchet Flap Text: The defect edges were debeveled with a #15 scalpel blade.  Given the location of the defect, shape of the defect and the proximity to free margins a hatchet flap was deemed most appropriate.  Using a sterile surgical marker, an appropriate hatchet flap was drawn incorporating the defect and placing the expected incisions within the relaxed skin tension lines where possible.    The area thus outlined was incised deep to adipose tissue with a #15 scalpel blade.  The skin margins were undermined to an appropriate distance in all directions utilizing iris scissors. Epidermal Autograft Text: The defect edges were debeveled with a #15 scalpel blade.  Given the location of the defect, shape of the defect and the proximity to free margins an epidermal autograft was deemed most appropriate.  Using a sterile surgical marker, the primary defect shape was transferred to the donor site. The epidermal graft was then harvested.  The skin graft was then placed in the primary defect and oriented appropriately. Medical Necessity Statement: The diagnosed tumor type and the patient’s tumor location and characteristics made Mohs surgery the preferred treatment. Wide local excision, destruction, and radiation therapy were considered, but were less optimal options due to their likelihood of having a lower cure rate, excising more tissue in vital areas, leaving a depressed, hypopigmented scar and/or requiring greater patient time and expense.\\n\\nBased on my medical judgement, Mohs surgery is the most appropriate treatment for this cancer compared to other treatments. No Residual Tumor Seen Histology Text: There were no malignant cells seen in the sections examined. Complex Repair And Flap Additional Text (Will Appearing After The Standard Complex Repair Text): The complex repair was not sufficient to completely close the primary defect. The remaining additional defect was repaired with the flap mentioned below. Zygomaticofacial Flap Text: Given the location of the defect, shape of the defect and the proximity to free margins a zygomaticofacial flap was deemed most appropriate for repair.  Using a sterile surgical marker, the appropriate flap was drawn incorporating the defect and placing the expected incisions within the relaxed skin tension lines where possible. The area thus outlined was incised deep to adipose tissue with a #15 scalpel blade with preservation of a vascular pedicle.  The skin margins were undermined to an appropriate distance in all directions utilizing iris scissors.  The flap was then placed into the defect and anchored with interrupted buried subcutaneous sutures. Modified Advancement Flap Text: The defect edges were debeveled with a #15 scalpel blade.  Given the location of the defect, shape of the defect and the proximity to free margins a modified advancement flap was deemed most appropriate.  Using a sterile surgical marker, an appropriate advancement flap was drawn incorporating the defect and placing the expected incisions within the relaxed skin tension lines where possible.    The area thus outlined was incised deep to adipose tissue with a #15 scalpel blade.  The skin margins were undermined to an appropriate distance in all directions utilizing iris scissors. Consent (Spinal Accessory)/Introductory Paragraph: The rationale for Mohs was explained to the patient and consent was obtained. The risks, benefits and alternatives to therapy were discussed in detail. Specifically, the risks of damage to the spinal accessory nerve, infection, scarring, bleeding, prolonged wound healing, incomplete removal, allergy to anesthesia, and recurrence were addressed. Prior to the procedure, the treatment site was clearly identified and confirmed by the patient. All components of Universal Protocol/PAUSE Rule completed. Nostril Rim Text: The closure involved the nostril rim. Closure 2 Information: This tab is for additional flaps and grafts, including complex repair and grafts and complex repair and flaps. You can also specify a different location for the additional defect, if the location is the same you do not need to select a new one. We will insert the automated text for the repair you select below just as we do for solitary flaps and grafts. Please note that at this time if you select a location with a different insurance zone you will need to override the ICD10 and CPT if appropriate. Advancement Flap (Double) Text: The defect edges were debeveled with a #15 scalpel blade.  Given the location of the defect and the proximity to free margins a double advancement flap was deemed most appropriate.  Using a sterile surgical marker, the appropriate advancement flaps were drawn incorporating the defect and placing the expected incisions within the relaxed skin tension lines where possible.    The area thus outlined was incised deep to adipose tissue with a #15 scalpel blade.  The skin margins were undermined to an appropriate distance in all directions utilizing iris scissors. Eye Protection Verbiage: Before proceeding with the stage, a plastic scleral shield was inserted. The globe was anesthetized with a few drops of 1% lidocaine with 1:100,000 epinephrine. Then, an appropriate sized scleral shield was chosen and coated with lacrilube ointment. The shield was gently inserted and left in place for the duration of each stage. After the stage was completed, the shield was gently removed. Retention Suture Text: Retention sutures were placed to support the closure and prevent dehiscence. Rhomboid Transposition Flap Text: The defect edges were debeveled with a #15 scalpel blade.  Given the location of the defect and the proximity to free margins a rhomboid transposition flap was deemed most appropriate.  Using a sterile surgical marker, an appropriate rhomboid flap was drawn incorporating the defect.    The area thus outlined was incised deep to adipose tissue with a #15 scalpel blade.  The skin margins were undermined to an appropriate distance in all directions utilizing iris scissors. Localized Dermabrasion Text: The patient was draped in routine manner.  Localized dermabrasion using 3 x 17 mm wire brush was performed in routine manner to papillary dermis. This spot dermabrasion is being performed to complete skin cancer reconstruction. It also will eliminate the other sun damaged precancerous cells that are known to be part of the regional effect of a lifetime's worth of sun exposure. This localized dermabrasion is therapeutic and should not be considered cosmetic in any regard. Banner Transposition Flap Text: The defect edges were debeveled with a #15 scalpel blade.  Given the location of the defect and the proximity to free margins a Banner transposition flap was deemed most appropriate.  Using a sterile surgical marker, an appropriate flap drawn around the defect. The area thus outlined was incised deep to adipose tissue with a #15 scalpel blade.  The skin margins were undermined to an appropriate distance in all directions utilizing iris scissors. Area H Indication Text: Tumors in this location are included in Area H (eyelids, eyebrows, nose, lips, chin, ear, pre-auricular, post-auricular, temple, genitalia, hands, feet, ankles and areola).  Tissue conservation is critical in these anatomic locations. Where Do You Want The Question To Include Opioid Counseling Located?: Case Summary Tab Skin Substitute Text: The defect edges were debeveled with a #15 scalpel blade.  Given the location of the defect, shape of the defect and the proximity to free margins a skin substitute graft was deemed most appropriate.  The graft material was trimmed to fit the size of the defect. The graft was then placed in the primary defect and oriented appropriately. Scc Spindle Histology Text: Irregular spindle cell proliferation with cells coalesced to form vague outlined islands surrounded by a myxoid and inflamed stroma, consistent with spindled squamous cell carcinoma. Deep Sutures: 4-0 Monocryl Hemigard Retention Suture: 0-0 Nylon Referring Physician (Optional): DOMINICK Tsang MD Repair Hemostasis (Optional): Electrocautery Muscle Hinge Flap Text: The defect edges were debeveled with a #15 scalpel blade.  Given the size, depth and location of the defect and the proximity to free margins a muscle hinge flap was deemed most appropriate.  Using a sterile surgical marker, an appropriate hinge flap was drawn incorporating the defect. The area thus outlined was incised with a #15 scalpel blade.  The skin margins were undermined to an appropriate distance in all directions utilizing iris scissors. Surgeon Performing Repair (Optional): Yazmin Hodge MD Epidermal Closure: running Pain Refusal Text: I offered to prescribe pain medication but the patient refused to take this medication. Ear Wedge Repair Text: A wedge excision was completed by carrying down an excision through the full thickness of the ear and cartilage with an inward facing Burow's triangle. The wound was then closed in a layered fashion. Bcc Micronodular Histology Text: Small basaloid islands with peripheral pallisading with clefting from the stroma. Previous Accession (Optional): [id number] Orbicularis Oris Muscle Flap Text: The defect edges were debeveled with a #15 scalpel blade.  Given that the defect affected the competency of the oral sphincter an obicularis oris muscle flap was deemed most appropriate to restore this competency and normal muscle function.  Using a sterile surgical marker, an appropriate flap was drawn incorporating the defect. The area thus outlined was incised with a #15 scalpel blade. Cheiloplasty (Complex) Text: A decision was made to reconstruct the defect with a  cheiloplasty.  The defect was undermined extensively.  Additional obicularis oris muscle was excised with a 15 blade scalpel.  The defect was converted into a full thickness wedge to facilite a better cosmetic result.  Small vessels were then tied off with 5-0 monocyrl. The obicularis oris, superficial fascia, adipose and dermis were then reapproximated.  After the deeper layers were approximated the epidermis was reapproximated with particular care given to realign the vermilion border. Anesthesia Volume In Cc: 4 Cheek Interpolation Flap Text: A decision was made to reconstruct the defect utilizing an interpolation axial flap and a staged reconstruction.  A telfa template was made of the defect.  This telfa template was then used to outline the Cheek Interpolation flap.  The donor area for the pedicle flap was then injected with anesthesia.  The flap was excised through the skin and subcutaneous tissue down to the layer of the underlying musculature.  The interpolation flap was carefully excised within this deep plane to maintain its blood supply.  The edges of the donor site were undermined.   The donor site was closed in a primary fashion.  The pedicle was then rotated into position and sutured.  Once the tube was sutured into place, adequate blood supply was confirmed with blanching and refill.  The pedicle was then wrapped with xeroform gauze and dressed appropriately with a telfa and gauze bandage to ensure continued blood supply and protect the attached pedicle. A-T Advancement Flap Text: The defect edges were debeveled with a #15 scalpel blade.  Given the location of the defect, shape of the defect and the proximity to free margins an A-T advancement flap was deemed most appropriate.  Using a sterile surgical marker, an appropriate advancement flap was drawn incorporating the defect and placing the expected incisions within the relaxed skin tension lines where possible.    The area thus outlined was incised deep to adipose tissue with a #15 scalpel blade.  The skin margins were undermined to an appropriate distance in all directions utilizing iris scissors. Inflammation Suggestive Of Cancer Camouflage Histology Text: There was a dense lymphocytic infiltrate which prevented adequate histologic evaluation of adjacent structures. Bcc Infundibulocystic Histology Text: Multiple pink strands of squamous epithelium and blue basaloid buds at the tips of the strands with some horn cysts. Unna Boot Text: An Unna boot was placed to help immobilize the limb and facilitate more rapid healing. Length To Time In Minutes Device Was In Place: 10 Transposition Flap Text: The defect edges were debeveled with a #15 scalpel blade.  Given the location of the defect and the proximity to free margins a transposition flap was deemed most appropriate.  Using a sterile surgical marker, an appropriate transposition flap was drawn incorporating the defect.    The area thus outlined was incised deep to adipose tissue with a #15 scalpel blade.  The skin margins were undermined to an appropriate distance in all directions utilizing iris scissors. Debridement Text: The wound edges were debrided prior to proceeding with the closure to facilitate wound healing. Home Suture Removal Text: Patient was provided instructions on removing sutures and will remove their sutures at home.  If they have any questions or difficulties they will call the office. Bcc Histology Text: Basaloid islands with peripheral palisading and clefting from the stroma. Mucin present. Bcc  Morpheaform/Sclerosing Histology Text: Thin infiltrating strands of basaloid cells with sclerotic stroma. In some areas, there are tadpole-like islands. Purse String (Intermediate) Text: Given the location of the defect and the characteristics of the surrounding skin a purse string intermediate closure was deemed most appropriate.  Undermining was performed circumfirentially around the surgical defect.  A purse string suture was then placed and tightened. O-T Plasty Text: The defect edges were debeveled with a #15 scalpel blade.  Given the location of the defect, shape of the defect and the proximity to free margins an O-T plasty was deemed most appropriate.  Using a sterile surgical marker, an appropriate O-T plasty was drawn incorporating the defect and placing the expected incisions within the relaxed skin tension lines where possible.    The area thus outlined was incised deep to adipose tissue with a #15 scalpel blade.  The skin margins were undermined to an appropriate distance in all directions utilizing iris scissors. Consent (Nose)/Introductory Paragraph: The rationale for Mohs was explained to the patient and consent was obtained. The risks, benefits and alternatives to therapy were discussed in detail. Specifically, the risks of nasal deformity, changes in the flow of air through the nose, infection, scarring, bleeding, prolonged wound healing, incomplete removal, allergy to anesthesia, nerve injury and recurrence were addressed. Prior to the procedure, the treatment site was clearly identified and confirmed by the patient. All components of Universal Protocol/PAUSE Rule completed. Advancement Flap (Single) Text: The defect edges were debeveled with a #15 scalpel blade.  Given the location of the defect and the proximity to free margins a single advancement flap was deemed most appropriate.  Using a sterile surgical marker, an appropriate advancement flap was drawn incorporating the defect and placing the expected incisions within the relaxed skin tension lines where possible.    The area thus outlined was incised deep to adipose tissue with a #15 scalpel blade.  The skin margins were undermined to an appropriate distance in all directions utilizing iris scissors. Adjacent Tissue Transfer Text: The defect edges were debeveled with a #15 scalpel blade.  Given the location of the defect and the proximity to free margins an adjacent tissue transfer was deemed most appropriate.  Using a sterile surgical marker, an appropriate flap was drawn incorporating the defect and placing the expected incisions within the relaxed skin tension lines where possible.    The area thus outlined was incised deep to adipose tissue with a #15 scalpel blade.  The skin margins were undermined to an appropriate distance in all directions utilizing iris scissors. Repair Type: Graft H Plasty Text: Given the location of the defect, shape of the defect and the proximity to free margins a H-plasty was deemed most appropriate for repair.  Using a sterile surgical marker, the appropriate advancement arms of the H-plasty were drawn incorporating the defect and placing the expected incisions within the relaxed skin tension lines where possible. The area thus outlined was incised deep to adipose tissue with a #15 scalpel blade. The skin margins were undermined to an appropriate distance in all directions utilizing iris scissors.  The opposing advancement arms were then advanced into place in opposite direction and anchored with interrupted buried subcutaneous sutures. Mohs Case Number:  O-Z Flap Text: The defect edges were debeveled with a #15 scalpel blade.  Given the location of the defect, shape of the defect and the proximity to free margins an O-Z flap was deemed most appropriate.  Using a sterile surgical marker, an appropriate transposition flap was drawn incorporating the defect and placing the expected incisions within the relaxed skin tension lines where possible. The area thus outlined was incised deep to adipose tissue with a #15 scalpel blade.  The skin margins were undermined to an appropriate distance in all directions utilizing iris scissors. Cartilage Graft Text: The defect edges were debeveled with a #15 scalpel blade.  Given the location of the defect, shape of the defect, the fact the defect involved a full thickness cartilage defect a cartilage graft was deemed most appropriate.  An appropriate donor site was identified, cleansed, and anesthetized. The cartilage graft was then harvested and transferred to the recipient site, oriented appropriately and then sutured into place.  The secondary defect was then repaired using a primary closure. Consent 3/Introductory Paragraph: I gave the patient a chance to ask questions they had about the procedure.  Following this I explained the Mohs procedure and consent was obtained. The risks, benefits and alternatives to therapy were discussed in detail. Specifically, the risks of infection, scarring, bleeding, prolonged wound healing, incomplete removal, allergy to anesthesia, nerve injury and recurrence were addressed. Prior to the procedure, the treatment site was clearly identified and confirmed by the patient. All components of Universal Protocol/PAUSE Rule completed. Non-Graft Cartilage Fenestration Text: The cartilage was fenestrated with a 2mm punch biopsy to help facilitate healing. Consent (Marginal Mandibular)/Introductory Paragraph: The rationale for Mohs was explained to the patient and consent was obtained. The risks, benefits and alternatives to therapy were discussed in detail. Specifically, the risks of damage to the marginal mandibular branch of the facial nerve, infection, scarring, bleeding, prolonged wound healing, incomplete removal, allergy to anesthesia, and recurrence were addressed. Prior to the procedure, the treatment site was clearly identified and confirmed by the patient. All components of Universal Protocol/PAUSE Rule completed. Closure 3 Information: This tab is for additional flaps and grafts above and beyond our usual structured repairs.  Please note if you enter information here it will not currently bill and you will need to add the billing information manually. Dermal Autograft Text: The defect edges were debeveled with a #15 scalpel blade.  Given the location of the defect, shape of the defect and the proximity to free margins a dermal autograft was deemed most appropriate.  Using a sterile surgical marker, the primary defect shape was transferred to the donor site. The area thus outlined was incised deep to adipose tissue with a #15 scalpel blade.  The harvested graft was then trimmed of adipose and epidermal tissue until only dermis was left.  The skin graft was then placed in the primary defect and oriented appropriately. Incidental Actinic Keratosis Histology Text: Incidentally, there is the presence of basilar crowding of keratinocytes with overlying parakeratosis, consistent with incidental actinic keratosis. There is no evidence of full thickness atypia. Ear Star Wedge Flap Text: The defect edges were debeveled with a #15 blade scalpel.  Given the location of the defect and the proximity to free margins (helical rim) an ear star wedge flap was deemed most appropriate.  Using a sterile surgical marker, the appropriate flap was drawn incorporating the defect and placing the expected incisions between the helical rim and antihelix where possible.  The area thus outlined was incised through and through with a #15 scalpel blade. Star Wedge Flap Text: The defect edges were debeveled with a #15 scalpel blade.  Given the location of the defect, shape of the defect and the proximity to free margins a star wedge flap was deemed most appropriate.  Using a sterile surgical marker, an appropriate rotation flap was drawn incorporating the defect and placing the expected incisions within the relaxed skin tension lines where possible. The area thus outlined was incised deep to adipose tissue with a #15 scalpel blade.  The skin margins were undermined to an appropriate distance in all directions utilizing iris scissors. Brow Lift Text: A midfrontal incision was made medially to the defect to allow access to the tissues just superior to the left eyebrow. Following careful dissection inferiorly in a supraperiosteal plane to the level of the left eyebrow, several 3-0 monocryl sutures were used to resuspend the eyebrow orbicularis oculi muscular unit to the superior frontal bone periosteum. This resulted in an appropriate reapproximation of static eyebrow symmetry and correction of the left brow ptosis. Surgeon: Yazmin Hodge MD Mohs Method Verbiage: An incision at a 45 degree angle following the standard Mohs approach was done and the specimen was harvested as a microscopic controlled layer. Mucosal Advancement Flap Text: Given the location of the defect, shape of the defect and the proximity to free margins a mucosal advancement flap was deemed most appropriate. Incisions were made with a 15 blade scalpel in the appropriate fashion along the cutaneous vermilion border and the mucosal lip. The remaining actinically damaged mucosal tissue was excised.  The mucosal advancement flap was then elevated to the gingival sulcus with care taken to preserve the neurovascular structures and advanced into the primary defect. Care was taken to ensure that precise realignment of the vermilion border was achieved. Z Plasty Text: The lesion was extirpated to the level of the fat with a #15 scalpel blade.  Given the location of the defect, shape of the defect and the proximity to free margins a Z-plasty was deemed most appropriate for repair.  Using a sterile surgical marker, the appropriate transposition arms of the Z-plasty were drawn incorporating the defect and placing the expected incisions within the relaxed skin tension lines where possible.    The area thus outlined was incised deep to adipose tissue with a #15 scalpel blade.  The skin margins were undermined to an appropriate distance in all directions utilizing iris scissors.  The opposing transposition arms were then transposed into place in opposite direction and anchored with interrupted buried subcutaneous sutures.

## 2024-03-30 NOTE — PROGRESS NOTES
Monitor summary: SR/Flutter, HR 65-86, RI 0.14, QRS 0.11, QT 0.37 with 2 degree type I HB, HR touched down to the 40s, and Blocked PACs per strip from monitor room

## 2024-03-30 NOTE — PROGRESS NOTES
Infectious Disease Progress Note    Author: Giancarlo Ervin M.D. Date & Time of service: 3/30/2024  12:39 PM    Chief Complaint:  Follow-up for bacteremia    Interval History:  3/30 Tmax 99.3, white count down to 11.5, tolerated switch to ceftriaxone, cultures as below, creatinine 0.39, mildly elevated transaminases,     Labs Reviewed and Medications Reviewed.    Review of Systems:  Review of Systems   Unable to perform ROS: Medical condition       Hemodynamics:  Temp (24hrs), Av.8 °C (98.3 °F), Min:36.6 °C (97.8 °F), Max:37.4 °C (99.3 °F)  Temperature: 36.6 °C (97.8 °F)  Pulse  Av.8  Min: 48  Max: 97   Blood Pressure : 123/58       Physical Exam:  Physical Exam  Vitals and nursing note reviewed.   Constitutional:       General: She is not in acute distress.     Appearance: She is ill-appearing.      Comments: Asleep, snoring, not awakening to light stimuli   HENT:      Mouth/Throat:      Pharynx: No oropharyngeal exudate.   Cardiovascular:      Rate and Rhythm: Normal rate.   Pulmonary:      Effort: Pulmonary effort is normal. No respiratory distress.      Breath sounds: No stridor.   Abdominal:      General: There is no distension.   Musculoskeletal:         General: No swelling or tenderness.   Skin:     Findings: No erythema or rash.         Meds:    Current Facility-Administered Medications:     cefTRIAXone (ROCEPHIN) IV    NS    amLODIPine    atorvastatin    losartan    [Held by provider] rivaroxaban    acetaminophen    Labs:  Recent Labs     24  0103 24  0035 24  0129   WBC 19.0* 18.6* 11.5*   RBC 4.18* 3.96* 3.78*   HEMOGLOBIN 12.5 11.4* 11.3*   HEMATOCRIT 37.1 34.6* 33.7*   MCV 88.8 87.4 89.2   MCH 29.9 28.8 29.9   RDW 47.9 46.5 47.6   PLATELETCT 162* 173 206   MPV 10.5 10.5 10.5   NEUTSPOLYS  --   --  82.50*   LYMPHOCYTES  --   --  7.80*   MONOCYTES  --   --  7.90   EOSINOPHILS  --   --  1.20   BASOPHILS  --   --  0.30     Recent Labs     24  0103 24  0035  03/30/24  0129   SODIUM 134* 133* 135   POTASSIUM 4.1 3.8 3.5*   CHLORIDE 104 103 106   CO2 17* 17* 17*   GLUCOSE 120* 111* 114*   BUN 12 13 13     Recent Labs     03/28/24  0103 03/29/24  0035 03/30/24  0129   ALBUMIN  --  2.8* 2.6*   TBILIRUBIN  --  0.5 0.4   ALKPHOSPHAT  --  69 67   TOTPROTEIN  --  5.7* 5.5*   ALTSGPT  --  34 55*   ASTSGOT  --  33 61*   CREATININE 0.39* 0.51 0.39*       Imaging:  CT-HEAD W/O    Result Date: 3/29/2024  3/29/2024 3:36 PM HISTORY/REASON FOR EXAM:  Acute change in mental status, MRI with embolic strokes. TECHNIQUE/EXAM DESCRIPTION AND NUMBER OF VIEWS: CT of the head without contrast. The study was performed on a helical multidetector CT scanner. Contiguous axial sections were obtained from the skull base through the vertex. Up to date radiation dose reduction adjustments have been utilized to meet ALARA standards for radiation dose reduction. COMPARISON:  MRI brain without and with IV contrast, 3/28/2024. FINDINGS: There is age-related central and cortical atrophy. Diffuse chronic migraines or pelvic white matter changes are evident. There are patchy areas of decreased attenuation at the gray-white matter junctions, compatible with known sites of emboli on the MRI of the brain without and with IV contrast. There is no hemorrhagic transformation. No extra-axial fluid collections. There are postsurgical changes in the globes. Paranasal sinuses, middle ear cavities and mastoid air cells are clear. Calvarium is intact. No shift in midline structures. Basilar and perimesencephalic cisterns are preserved.     1. Stable patchy areas of decreased attenuation at the gray-white matter junctions in both cerebral hemispheres and cerebellar hemispheres when compared with the prior MRI of the brain performed on 3/28/2024. 2. No hemorrhagic transformation. 3. No other acute findings.     EC-ECHOCARDIOGRAM COMPLETE W/O CONT    Result Date: 3/29/2024  Transthoracic Echo Report Echocardiography  Laboratory CONCLUSIONS Hyperdynamic left ventricular systolic function. Visually estimated ejection fraction is 70-75 %. The right ventricle is mildly dilated. Normal right ventricular systolic function. 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 Mild mitral stenosis. Mean transvalvular gradient is 4 mmHg at a heart rate of 72 BPM. Mild mitral regurgitation. 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 Right ventricular systolic pressure is estimated to be 34 mmHg (normal). Compared to the prior study on 10/23/2023, mass on mitral valve is more clearly visualized in this study.  Correlate clinically Primary team is notified of findings. SHAILESH BYRD Exam Date:         2024                    10:21 Exam Location:     Inpatient Priority:          Stat Ordering Physician:        LINK CHURCHILL Referring Physician:       [id number] Sonographer:               Lexie Salmeron RDCS Age:    88     Gender:    F MRN:    [id number] :    1935 BSA:    1.75   Ht (in):    62     Wt (lb):    163 Exam Type:     Complete Indications:     Endocarditis ICD Codes:       421 CPT Codes:       71849 BP:   145    /   78     HR:   72 Technical Quality:       Good MEASUREMENTS  (Male / Female) Normal Values 2D ECHO LV Diastolic Diameter PLAX        4.9 cm                4.2 - 5.9 / 3.9 - 5.3 cm LV Systolic Diameter PLAX         2.7 cm                2.1 - 4.0 cm IVS Diastolic Thickness           1.1 cm                LVPW Diastolic Thickness          1.3 cm                LVOT Diameter                     1.8 cm                DOPPLER AV Peak Velocity                  2.4 m/s               AV Peak Gradient                  22.1 mmHg             AV Mean Gradient                  12 mmHg               LVOT Peak Velocity                1 m/s                 AV Area Cont Eq vti               1.4 cm2               MV Velocity Time Integral         39.8  cm               MV Pressure Half Time             77.4 ms               MV Area PHT                       2.8 cm2               TR Peak Velocity                  265 cm/s              PV Peak Velocity                  0.96 m/s              PV Peak Gradient                  3.7 mmHg              * Indicates values subject to auto-interpretation LV EF:        % FINDINGS Left Ventricle Normal left ventricular chamber size. Normal left ventricular wall thickness. Hyperdynamic left ventricular systolic function. Visually estimated ejection fraction is 70-75 %. No regional wall motion abnormalities. A reliable estimation of diastolic function cannot be made due to mitral valve disease. Right Ventricle The right ventricle is mildly dilated. Normal right ventricular systolic function. Right Atrium Normal right atrial size. Normal inferior vena cava size and inspiratory collapse. Left Atrium Moderately dilated left atrium. Left atrial volume index is 45 mL/sq m. Mitral Valve Mobile mass noted on mitral valve (posterior leaflet). Mild mitral stenosis. Mean transvalvular gradient is 4 mmHg at a heart rate of 72 BPM. Mild mitral regurgitation. Aortic Valve Known TAVR aortic valve  that is functioning normally with normal transvalvular gradients. Transvalvular gradients are - Peak: 25 mmHg,  Mean: 14 mmHg. No evidence of paravalvular leak. Tricuspid Valve Structurally normal tricuspid valve. No tricuspid stenosis. Trace tricuspid regurgitation. Right atrial pressure is estimated to be 3 mmHg. Right ventricular systolic pressure is estimated to be 34 mmHg. Pulmonic Valve The pulmonic valve is not well visualized. No pulmonic stenosis. Mild pulmonic insufficiency. Pericardium No pericardial effusion. Aorta The ascending aorta diameter is 3.3 cm. Iman Zhong MD (Electronically Signed) Final Date:     29 March 2024                 11:55    MR-BRAIN-WITH & W/O    Result Date: 3/29/2024  3/28/2024 3:46 PM HISTORY/REASON FOR EXAM:  questinable seizure Altered level of consciousness TECHNIQUE/EXAM DESCRIPTION: T1 sagittal, T2 axial, flair coronal, T1 coronal, and diffusion-weighted axial images were obtained of the brain pre-contrast followed by T1 coronal and axial images post intravenous administration of 15 mL ProHance. COMPARISON: CT brain dated 3/26/2024 FINDINGS: Multiple small foci of acute infarction are noted in the bilateral cerebellum, brainstem involving the left chris, the right midbrain posteriorly, bilateral external capsular regions, right insular region, right frontal region, left medial frontal region,  bilateral parietal cortex and subcortical region, left corpus callosum and bilateral high parietal and frontal convexity cortex and subcortical region. Intracranial flow voids are normal. Gradient-echo images do not demonstrate any evidence of hemorrhage. Postcontrast images do not demonstrate any abnormal intracranial enhancement. Age-related volume loss noted with prominent sulci, cisterns and ventricles. Ventricular dilatation is proportionate to the degree of cerebral volume loss. There is no abnormal intracranial enhancement. The Sella is within normal limits. The craniocervical junction is within normal limits. Visualized intracranial arterial flow voids are within normal limits. Bone marrow signal in the calvarium is within normal limits. Included portions of the paranasal sinuses are within normal limits. Included portions of the mastoid air cells are within normal limits. Included portions of the orbits are within normal limits     Multiple foci of acute infarction scattered throughout the bilateral cerebral hemispheres, cerebellum and brainstem as described above, suggesting a proximal cardiovascular embolic source. The largest cluster of acute infarctions is noted in the right frontoparietal region involving the right MCA territory. Age-related volume loss.    CT-CTA NECK WITH & W/O-POST PROCESSING    Result Date:  3/26/2024  3/26/2024 4:35 PM HISTORY/REASON FOR EXAM:  code stroke LEFT-sided weakness, altered mental status. TECHNIQUE/EXAM DESCRIPTION: CT angiogram of the neck with contrast. Postcontrast images were obtained of the neck from the great vessels through the skull base following the power injection of nonionic contrast at 5.0 mL/sec. Thin-section helical images were obtained with overlapping reconstruction interval. Coronal and oblique multiplanar volume reformats were generated. Cervical internal carotid artery percent stenosis is calculated using the standard method according to the NASCET criteria wherein a segment of uniform caliber mid or distal cervical internal carotid is used as the reference denominator. 3D angiographic images were reviewed on PACS.  Maximum intensity projection (MIP) images were generated and reviewed 80 mL of Omnipaque 350 nonionic contrast was injected intravenously. Low dose optimization technique was utilized for this CT exam including automated exposure control and adjustment of the mA and/or kV according to patient size. COMPARISON:  None. FINDINGS: Aortic arch: conventional branching pattern. There is atherosclerotic plaque of the aorta. Right common carotid artery: Patent Right internal carotid artery: Atherosclerotic plaque without significant stenosis (less than 50%). Left common carotid artery is patent. Left internal carotid artery: Atherosclerotic plaque without significant stenosis (less than 50%). The right vertebral artery is patent without dissection or stenosis. The left vertebral artery is patent without dissection or stenosis.  LEFT dominant vertebral artery. Vertebrobasilar confluence: The vertebrobasilar confluence appears normal. Lung apices are clear The soft tissues of the neck are within normal limits. Multilevel degenerative changes cervical spine. 3D angiographic/MIP images of the vasculature confirm the vascular findings as described above.     No focal  high-grade stenosis, dissection or occlusion of the cervical carotid or vertebral arteries.    CT-CTA HEAD WITH & W/O-POST PROCESS    Result Date: 3/26/2024  3/26/2024 4:35 PM HISTORY/REASON FOR EXAM:  code stroke.  LEFT-sided weakness, altered mental status. TECHNIQUE/EXAM DESCRIPTION: CT angiogram of the [place] without and with contrast.  Initial precontrast images were obtained of the head from the skull base through the vertex.  Postcontrast images were obtained of the [place] following the power injection of nonionic contrast at 5.0 mL/sec. Thin-section helical images were obtained with overlapping reconstruction interval. Coronal and sagittal multiplanar volume reformats were generated.  3D angiographic images were reviewed on PACS.  Maximum intensity projection (MIP) images were generated and reviewed. 80 mL of Omnipaque 350 nonionic contrast was injected intravenously. Low dose optimization technique was utilized for this CT exam including automated exposure control and adjustment of the mA and/or kV according to patient size. COMPARISON:  CT head 3/26/2024 FINDINGS: The posterior circulation shows the distal vertebral arteries to be patent.  LEFT dominant vertebral artery.  The vertebrobasilar confluence is intact. The basilar artery is patent. No aneurysm or occlusive lesion is evident. Anterior cerebral arteries are normal in caliber and patent.  LEFT middle cerebral artery is patent.  Potential focal narrowing or nonocclusive clot in superior division RIGHT sylvian vessel, M3 segment. 3D angiographic/MIP images of the vasculature confirm the vascular findings as described above.     1.  Short segment decreased enhancement in the RIGHT superior sylvian division of middle superior artery, M3 segment, focal stenosis versus nonocclusive clot. 2.  No intracranial aneurysm or abrupt large vessel cut off.    CT-HEAD W/O    Result Date: 3/26/2024  3/26/2024 4:40 PM HISTORY/REASON FOR EXAM:   Concern for stroke. TECHNIQUE/EXAM DESCRIPTION AND NUMBER OF VIEWS: CT of the head without contrast. The study was performed on a helical multidetector CT scanner. Contiguous axial sections were obtained from the skull base through the vertex. Up to date radiation dose reduction adjustments have been utilized to meet ALARA standards for radiation dose reduction. COMPARISON:  Noncontrast head CT performed one day prior FINDINGS: Few small scattered parenchymal calcifications suggesting sequela of previous infection or inflammation. Chronic appearing bilateral basal ganglia lacunar infarcts. Hypoattenuating foci in the bilateral cerebellum which are indeterminant. They could be subacute infarcts versus other. Recommend brain MRI workup. Redemonstration of a small focus of low-attenuation in the left parietal deep and subcortical white matter. Possibly gliosis versus subacute infarct. Moderate nonspecific white matter changes. No intracranial mass effect, midline shift, hydrocephalus, hemorrhage. Bilateral maxillary sinus mucosal thickening. Left maxillary sinus mucosal retention cyst. Mastoids in the field of view are unremarkable.     1.  Hypoattenuating foci in the bilateral cerebellum which are indeterminant. They could be subacute infarcts versus other. Recommend brain MRI workup. 2.  Redemonstration of a small focus of low-attenuation in the left parietal deep and subcortical white matter. Possibly gliosis versus subacute infarct. 3.  Findings are stable since the noncontrast head CT performed one day prior.     CT-HEAD W/O    Result Date: 3/25/2024  3/25/2024 4:13 PM HISTORY/REASON FOR EXAM:  ALTERED LEVEL OF CONSCIOUSNESS. Patient found down. TECHNIQUE/EXAM DESCRIPTION AND NUMBER OF VIEWS: CT of the head without contrast. The study was performed on a [employer] CT scanner. Contiguous 5 mm axial sections were obtained from the skull base through the vertex. Up to date radiation dose reduction adjustments have been  utilized to meet ALARA standards for radiation dose reduction. COMPARISON:  None FINDINGS: Small, rounded hypodense areas of the bilateral cerebellar hemispheres. There is a small area of loss of gray-white differentiation in the left parietal lobe. The calvariae and skull base are unremarkable. There are no extraaxial fluid collections. There is a pattern of cerebral atrophy manifest as enlargement of sulcal markings and ventricular prominence.  The ventricular system and basal cisterns are otherwise unremarkable. There are areas of hypodensity in the white matter most consistent with small vessel ischemic change versus demyelination or gliosis. There are no hemorrhagic lesions. There are no mass effects or shift of midline structures. The paranasal sinuses and mastoids in the field of view are unremarkable.     1.  Small, rounded hypodense areas of the bilateral cerebellar hemispheres. These may represent age-indeterminate infarcts. Less likely this could represent edema related to metastatic disease. 2.  There is a small area of loss of gray-white differentiation in the left parietal lobe, possibly an age-indeterminate infarct. 3.  These findings can be further evaluated with contrast-enhanced MRI as indicated. 4.  Cerebral atrophy. 5.  White matter lucencies most consistent with    DX-CHEST-PORTABLE (1 VIEW)    Result Date: 3/25/2024  3/25/2024 2:54 PM HISTORY/REASON FOR EXAM:  Sepsis; sepsis. Altered level of consciousness. TECHNIQUE/EXAM DESCRIPTION AND NUMBER OF VIEWS: Single portable view of the chest. COMPARISON: Chest radiograph, 3/20/2024. FINDINGS: Lungs: No focal opacities were evident. No pleural effusion or visible pneumothorax. Mediastinum: The descending colon post surgical changes of TAVR. Aortic annular calcifications. Calcified atherosclerotic plaques in aorta. Other: Stable age-related degenerative changes in the spine and shoulders and diffuse decreased bone mineralization.     No acute  findings.    DX-CHEST-PORTABLE (1 VIEW)    Result Date: 3/20/2024  3/20/2024 10:49 AM HISTORY/REASON FOR EXAM:  hypoxia TECHNIQUE/EXAM DESCRIPTION AND NUMBER OF VIEWS: Single portable view of the chest. COMPARISON: 9/26/2023 FINDINGS: There is TAVR hardware. HEART: Stable size. There is atherosclerotic calcification in the aortic arch. LUNGS: Lung volumes are low. There is faint LEFT basilar opacity similar to prior. PLEURA: No effusion or pneumothorax.     1.  Persistently enlarged cardiac silhouette 2.  LEFT basilar opacity likely atelectasis rather than pneumonia      Micro:  Results       Procedure Component Value Units Date/Time    Blood Culture - Draw one from central line and one from peripheral site [[id number]]  (Abnormal) Collected: 03/25/24 1425    Order Status: Completed Specimen: Blood from Peripheral Updated: 03/30/24 1026     Significant Indicator POS     Source BLD     Site PERIPHERAL     Culture Result Growth detected by Bactec instrument. 03/26/2024  12:56      Streptococcus gordonii  Please see newest culture for susceptibilities      Narrative:      CALL  Cramer  61 tel. [phone],  CALLED  61 tel. [phone] 03/26/2024, 17:21, RB PERF. RESULTS CALLED TO:  Voalted to Blood Culture Pharmacy, no ID  No site indicated    E-Test [[id number]] Collected: 03/25/24 1540    Order Status: Completed Specimen: Other Updated: 03/30/24 1024     ETEST Sensitivity FINAL    Narrative:      THAD tel. [phone] 03/28/2024, 05:22, RB PERF. RESULTS CALLED TO: RN [id number]    Blood Culture - Draw one from central line and one from peripheral site [[id number]]  (Abnormal)  (Susceptibility) Collected: 03/25/24 1540    Order Status: Completed Specimen: Blood from Line Updated: 03/30/24 1023     Significant Indicator POS     Source BLD     Site Peripheral     Culture Result Growth detected by Bactec instrument. 03/28/2024  05:17      Streptococcus gordonii    Narrative:      CALL  Cramer  THAD tel. [phone],  CALLED  THAD tel.  [phone] 03/28/2024, 05:22, RB PERF. RESULTS CALLED TO: RN  [id number]  Left AC    Susceptibility       Streptococcus gordonii (1)       Antibiotic Interpretation Microscan   Method Status    Penicillin Sensitive 0.032 mcg/mL E-TEST Final    Cefotaxime Sensitive 0.023 mcg/mL E-TEST Final                       BLOOD CULTURE [[id number]] Collected: 03/27/24 0409    Order Status: Completed Specimen: Blood from Peripheral Updated: 03/28/24 0842     Significant Indicator NEG     Source BLD     Site PERIPHERAL     Culture Result No Growth  Note: Blood cultures are incubated for 5 days and  are monitored continuously.Positive blood cultures  are called to the RN and reported as soon as  they are identified.      Narrative:      Left Hand    BLOOD CULTURE [[id number]] Collected: 03/27/24 0428    Order Status: Completed Specimen: Blood from Peripheral Updated: 03/28/24 0842     Significant Indicator NEG     Source BLD     Site PERIPHERAL     Culture Result No Growth  Note: Blood cultures are incubated for 5 days and  are monitored continuously.Positive blood cultures  are called to the RN and reported as soon as  they are identified.      Narrative:      Left    Urine Culture (New) [[id number]]  (Abnormal)  (Susceptibility) Collected: 03/25/24 1550    Order Status: Completed Specimen: Urine Updated: 03/27/24 0727     Significant Indicator POS     Source UR     Site -     Culture Result -      Escherichia coli  >100,000 cfu/mL      Narrative:      Indication for culture:->Emergency Room Patient    Susceptibility       Escherichia coli (1)       Antibiotic Interpretation Microscan   Method Status    Ampicillin Resistant >16 mcg/mL MILY Final    Amoxicillin/CA Sensitive <=8/4 mcg/mL MILY Final    Ceftriaxone Sensitive <=1 mcg/mL MILY Final    Cefazolin Sensitive 4 mcg/mL MILY Final     Breakpoints when Cefazolin is used for therapy of infections  other than uncomplicated UTIs due to Enterobacterales are as  follows:  MILY and  Interpretation:  <=2 S  4 I  >=8 R         Ciprofloxacin Sensitive <=0.25 mcg/mL MILY Final    Cefepime Sensitive <=2 mcg/mL MILY Final    Cefuroxime Sensitive 8 mcg/mL MILY Final    Ampicillin/sulbactam Intermediate 16/8 mcg/mL IMLY Final    Tobramycin Sensitive <=2 mcg/mL MILY Final    Nitrofurantoin Sensitive <=32 mcg/mL MILY Final    Gentamicin Sensitive <=2 mcg/mL MILY Final    Levofloxacin Sensitive <=0.5 mcg/mL MILY Final    Minocycline Sensitive <=4 mcg/mL MILY Final    Pip/Tazobactam Sensitive <=8 mcg/mL MILY Final    Trimeth/Sulfa Sensitive <=0.5/9.5 mcg/mL MILY Final    Tigecycline Sensitive <=2 mcg/mL MILY Final                       Urinalysis [[id number]]  (Abnormal) Collected: 03/25/24 2138    Order Status: Completed Specimen: Urine Updated: 03/25/24 4711     Color Yellow     Character Clear     Specific Gravity 1.018     Ph 5.5     Glucose Negative mg/dL      Ketones Negative mg/dL      Protein 100 mg/dL      Bilirubin Negative     Urobilinogen, Urine 0.2     Nitrite Positive     Leukocyte Esterase Trace     Occult Blood Small     Micro Urine Req Microscopic            Assessment/Hospital course:  This is an 88-year-old female patient who presented after a fall for somnolence, altered mental status.  She has known protein S deficiency on Xarelto, hypertension, hyperlipidemia.  MRI of the brain with multiple acute infarcts, blood cultures x 2 positive for Strep gordonii, status post TAVR.  TTE with echodensity on the posterior leaflet of the native mitral valve, EKG with second-degree AV block.    Patient had a recent dental procedure about a month ago and has a temporary crown in place.    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  We will follow along.  Of note, according to patient's son, she does not have a well adhered crown in place, just a temporary one so please make sure this is communicated with the proceduralists  -Recommend Panorex or CT maxillofacial to look for dental abscesses  -Recommend MRI of the whole spine when able and if in line with goals of care    Disposition: Anticipate at least 4 to 6 weeks of IV antibiotics  Need for PICC line: Eventually yes, currently not cleared    Discussed with Dr. Parker.  This illness [id number] threat to [email or web address]  ID will follow

## 2024-03-30 NOTE — CONSULTS
Physical Medicine and Rehabilitation Consultation              Date of initial consultation: 3/30/2024  Requesting provider: ordered by Dash Parker M.D. at 03/30/24 1024    Consulting provider: Kathy Leon D.O.  Reason for consultation: assess for acute inpatient rehab appropriateness  LOS: 5 Day(s)    Chief complaint: AMS/  Found down     HPI: The patient is a 88 y.o.  female with a past medical history of s/p TAVR 2023, HTN, HLD, prior DVT, and RA ;  who presented on 3/25/2024  2:18 PM after being found down in AMS. Per documentation, patient's neighbors found her, unclear how long she had been on the ground for. Upon eval in the ED, CT head showed hypodense areas of bilateral cerebella representing age indeterminate infracts, but no acute intracranial findings. Patient was noted to have Hyponatremia with Na 133 an UA + for UTI. Patient was admitted for encephalopathy suspected secondary to UTI. Patient was started on ceftriaxone.   Patient was tolerating treatment, but then had code stroke on 3/26 after patient had worsening mentation and reports of left sided weakness. Neurology consulted, patient mostly had confusion but no lateralizing weakness; repeat CT head without acute changes, CTA head and neck showed questionable R MCA stenosis vs occlusion. MRI brain obtained on 3/28 showed multiple foci of acute infarct scattered through the bilateral cerebral hemispheres, cerebellum, and brainstem suspected due to cardio embolic source, with largest cluster noted at the R MCA territory.   ID was also consulted for concern for infectious etiology, blood cultures noted to be + for strep gordonii, patient was on CNS dosing of ceftriaxone, now switched to IV penicllin q4 hs  anticipated to need 4-6 weeks of IV abx.. Echo obtained showed 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. Cardiology consulted, planning for VINAY on Monday.     The patient currently reports she feels tired. Is fatigued by exam, asking to rest. Does  "follow commands for MMT and when asking her name states \" G-[id number]-DA\". Otherwise does not complete full ROS due to fatigue.     Social Hx:  Patient lives alone in a 1 story house  with 1 ZEN   At prior level of function patient was Independent with mobility and ADLs.         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    TECHNIQUE/EXAM DESCRIPTION AND NUMBER OF VIEWS:  CT of the head without contrast.    The study was performed on a helical multidetector CT scanner. Contiguous axial sections were obtained from the skull base through the vertex.    Up to date radiation dose reduction adjustments have been utilized to meet ALARA standards for radiation dose reduction.    COMPARISON:  MRI brain without and with IV contrast, 3/28/2024.    FINDINGS:  There is age-related central and cortical atrophy. Diffuse chronic migraines or pelvic white matter changes are evident. There are patchy areas of decreased attenuation at the gray-white matter junctions, compatible with known sites of emboli on the MRI   of the brain without and with IV contrast. There is no hemorrhagic transformation. No extra-axial fluid collections. There are postsurgical changes in the globes. Paranasal sinuses, middle ear cavities and mastoid air cells are clear. Calvarium is   intact. No shift in midline structures. Basilar and perimesencephalic cisterns are preserved.  Impression: 1. Stable patchy areas of decreased attenuation at the gray-white matter junctions in both " cerebral hemispheres and cerebellar hemispheres when compared with the prior MRI of the brain performed on 3/28/2024.  2. No hemorrhagic transformation.  3. No other acute findings.  EC-ECHOCARDIOGRAM COMPLETE W/O CONT  Transthoracic  Echo Report    Echocardiography Laboratory    CONCLUSIONS  Hyperdynamic left ventricular systolic function. Visually estimated   ejection fraction is 70-75 %.   The right ventricle is mildly dilated. Normal right ventricular   systolic function.  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 Mild   mitral stenosis. Mean transvalvular gradient is 4 mmHg at a heart rate   of 72 BPM. Mild mitral regurgitation.  Known TAVR aortic valve  that is functioning normally with normal   transvalvular gradients.   Right ventricular systolic pressure is estimated to be 34 mmHg   (normal).  Compared to the prior study on 10/23/2023, mass on mitral valve is more   clearly visualized in this study.  Correlate clinically  Primary team is notified of findings.    SHAILESH BYRD  Exam Date:         2024                      10:21  Exam Location:     Inpatient  Priority:          Stat    Ordering Physician:        LINK CHURCHILL  Referring Physician:       [id number] Morris  Sonographer:               Lexie Salmeron [facility]    Age:    88     Gender:    F  MRN:    [id number]  :    1935  BSA:    1.75   Ht (in):    62     Wt (lb):    163  Exam Type:     Complete    Indications:     Endocarditis  ICD Codes:       421    CPT Codes:       85587    BP:   145    /   78     HR:   72  Technical Quality:       Good    MEASUREMENTS  (Male / Female) Normal Values  2D ECHO  LV Diastolic Diameter PLAX        4.9 cm                4.2 - 5.9 / 3.9 - 5.3   cm  LV Systolic Diameter PLAX         2.7 cm                2.1 - 4.0 cm  IVS Diastolic Thickness           1.1 cm                  LVPW Diastolic Thickness          1.3 cm                  LVOT Diameter                     1.8 cm                     DOPPLER  AV Peak Velocity                  2.4 m/s                 AV Peak Gradient                  22.1 mmHg               AV Mean Gradient                  12 mmHg                 LVOT Peak Velocity                1 m/s                   AV Area Cont Eq vti               1.4 cm2                 MV Velocity Time Integral         39.8 cm                 MV Pressure Half Time             77.4 ms                 MV Area PHT                       2.8 cm2                 TR Peak Velocity                  265 cm/s                PV Peak Velocity                  0.96 m/s                PV Peak Gradient                  3.7 mmHg                  * Indicates values subject to auto-interpretation  LV EF:        %    FINDINGS  Left Ventricle  Normal left ventricular chamber size. Normal left ventricular wall   thickness. Hyperdynamic left ventricular systolic function. Visually   estimated ejection fraction is 70-75 %. No regional wall motion   abnormalities. A reliable estimation of diastolic function cannot be   made due to mitral valve disease.    Right Ventricle  The right ventricle is mildly dilated. Normal right ventricular   systolic function.    Right Atrium  Normal right atrial size. Normal inferior vena cava size and   inspiratory collapse.    Left Atrium  Moderately dilated left atrium. Left atrial volume index is 45 mL/sq m.    Mitral Valve  Mobile mass noted on mitral valve (posterior leaflet). Mild mitral   stenosis. Mean transvalvular gradient is 4 mmHg at a heart rate of 72   BPM. Mild mitral regurgitation.    Aortic Valve  Known TAVR aortic valve  that is functioning normally with normal   transvalvular gradients. Transvalvular gradients are - Peak: 25 mmHg,    Mean: 14 mmHg. No evidence of paravalvular leak.    Tricuspid Valve  Structurally normal tricuspid valve. No tricuspid stenosis. Trace   tricuspid regurgitation. Right atrial pressure is estimated to be 3   mmHg. Right ventricular  systolic pressure is estimated to be 34 mmHg.    Pulmonic Valve  The pulmonic valve is not well visualized. No pulmonic stenosis. Mild   pulmonic insufficiency.    Pericardium  No pericardial effusion.    Aorta  The ascending aorta diameter is 3.3 cm.    Iman Zhong MD  (Electronically Signed)  Final Date:     29 March 2024                   11:55  MR-BRAIN-WITH & W/O  Narrative: 3/28/2024 3:46 PM    HISTORY/REASON FOR EXAM: questinable seizure  Altered level of consciousness    TECHNIQUE/EXAM DESCRIPTION:    T1 sagittal, T2 axial, flair coronal, T1 coronal, and diffusion-weighted axial images were obtained of the brain pre-contrast followed by T1 coronal and axial images post intravenous administration of 15 mL ProHance.    COMPARISON: CT brain dated 3/26/2024    FINDINGS:  Multiple small foci of acute infarction are noted in the bilateral cerebellum, brainstem involving the left chris, the right midbrain posteriorly, bilateral external capsular regions, right insular region, right frontal region, left medial frontal region,   bilateral parietal cortex and subcortical region, left corpus callosum and bilateral high parietal and frontal convexity cortex and subcortical region.  Intracranial flow voids are normal.  Gradient-echo images do not demonstrate any evidence of hemorrhage.  Postcontrast images do not demonstrate any abnormal intracranial enhancement.  Age-related volume loss noted with prominent sulci, cisterns and ventricles. Ventricular dilatation is proportionate to the degree of cerebral volume loss.  There is no abnormal intracranial enhancement.  The Sella is within normal limits.  The craniocervical junction is within normal limits.  Visualized intracranial arterial flow voids are within normal limits.  Bone marrow signal in the calvarium is within normal limits.  Included portions of the paranasal sinuses are within normal limits.  Included portions of the mastoid air cells are within normal  "limits.  Included portions of the orbits are within normal limits  Impression: Multiple foci of acute infarction scattered throughout the bilateral cerebral hemispheres, cerebellum and brainstem as described above, suggesting a proximal cardiovascular embolic source.    The largest cluster of acute infarctions is noted in the right frontoparietal region involving the right MCA territory.    Age-related volume loss.        PROCEDURES:  None     PMH:  Past Medical History:   Diagnosis Date    Adenocarcinoma of colon (HCC) 10/30/2018    From sessile serrated polyp 10/2018    Anesthesia     pt states \"one new dr scraped my esophagus when they put the tube in and I started bleeding so they couldn't operate\"     Back pain 06/01/2022    0/10    Blood clotting disorder ([facility]) 2012    clot in leg    Bowel habit changes     constipation     Breath shortness 06/26/2023    with exertion    Cancer (HCC)     skin, colon 2018    Cataract     belia IOL     Chronic back pain greater than 3 months duration     Deep vein blood clot of left lower extremity ([facility]) 2022    Deep vein thrombosis ([facility]) 03/08/2016    First occurrence in LLE in late 1970s Second occurrence further up in LLE in 2012, has been on AC since     Essential hypertension, benign 06/27/2012    GERD (gastroesophageal reflux disease) 06/27/2012    Heart murmur     High cholesterol     Hyperlipidemia     Hypertension     hx of, not currently     Impaired fasting glucose 11/02/2017    Melanoma ([facility])     Mild aortic stenosis     Seeing Dr. Van    Other and unspecified hyperlipidemia 06/27/2012    Prediabetes     Protein S deficiency ([facility]) 11/02/2017    Noted in lab work 2013 as a part of work up at [facility]     Rheumatoid arthritis involving multiple sites with positive rheumatoid factor ([facility]) 03/14/2016    Dr. Escoto    Rheumatoid nodule ([facility]) 07/25/2017    Right bundle branch block 06/27/2012    pt denies     Stroke (cerebrum) ([facility]) 3/29/2024    Urinary incontinence " 11/02/2017       PSH:  Past Surgical History:   Procedure Laterality Date    TRANSCATHETER AORTIC VALVE REPLACEMENT Bilateral 9/25/2023    Procedure: TRANSCATHETER AORTIC VALVE REPLACEMENT;  Surgeon: Haseeb Serrano M.D.;  Location: SURGERY [facility];  Service: Cardiac    ECHOCARDIOGRAM, TRANSESOPHAGEAL, INTRAOPERATIVE N/A 9/25/2023    Procedure: ECHOCARDIOGRAM, TRANSESOPHAGEAL, INTRAOPERATIVE;  Surgeon: Haseeb Serrano M.D.;  Location: [facility];  Service: Cardiac    PACEMAKER INSERTION Right 9/25/2023    Procedure: INSERTION, TEMPORARY CARDIAC PACEMAKER;  Surgeon: Haseeb Serrano M.D.;  Location: [facility];  Service: Cardiac    LUMBAR FUSION O-ARM  09/21/2022    Procedure: L3-4 and L4-5 decompressive laminectomy, bilateral foraminotomies and L3-4-5 posterior lumbar instrumented fusion;  Surgeon: Rosa M Bonner M.D.;  Location: [facility];  Service: Neurosurgery    LUMBAR DECOMPRESSION  09/21/2022    Procedure: DECOMPRESSION, SPINE, LUMBAR;  Surgeon: Rosa M Bonner M.D.;  Location: [facility];  Service: Neurosurgery    LUMBAR LAMINECTOMY DISKECTOMY  09/21/2022    Procedure: LAMINECTOMY, SPINE, LUMBAR, WITH DISCECTOMY;  Surgeon: Rosa M Bonner M.D.;  Location: [facility];  Service: Neurosurgery    FORAMINOTOMY  09/21/2022    Procedure: FORAMINOTOMY, SPINE;  Surgeon: Rosa M Bonner M.D.;  Location: [facility];  Service: Neurosurgery    OH INJECTION,SACROILIAC JOINT Right 07/12/2022    Procedure: RIGHT sacroiliac joint injection with fluoroscopic guidance.;  Surgeon: Indira Lee M.D.;  Location: [facility];  Service: Pain Management    OH REMOVE ARMPITS LYMPH NODES COMPLT Left 06/07/2022    Procedure: LYMPHADENECTOMY, AXILLARY;  Surgeon: Bernardino Torres M.D.;  Location: SURGERY [facility];  Service: General    BLOCK EPIDURAL STEROID INJECTION Right 05/31/2022    Procedure: RIGHT L4-5 and L5-S1 transforaminal epidural  steroid injection with fluoroscopic guidance.;  Surgeon: Indira Lee M.D.;  Location: [facility]ERY [facility];  Service: Pain Management    BLOCK EPIDURAL STEROID INJECTION Right 05/10/2022    Procedure: Lumbar L4-5 interlaminar epidural steroid injection;  Surgeon: Indira Lee M.D.;  Location: [facility];  Service: Pain Management    WIDE EXCISION, LESION, HEAD AND NECK REGION Left 03/29/2022    Procedure: WIDE EXCISION, LESION, HEAD AND NECK REGION - RADICAL MALIGNANT MELANOMA OF LEFT CHEST;  Surgeon: Bernardino Torres M.D.;  Location: SURGERY SAME DAY [facility];  Service: General    LUMBAR MEDIAL BRANCH BLOCKS Bilateral 12/15/2020    Procedure: BLOCK, NERVE, SPINAL, LUMBAR, POSTERIOR RAMUS, MEDIAL BRANCH;  Surgeon: Chris Cooper M.D.;  Location: SURGERY [facility];  Service: Pain Management    IRRIGATION & DEBRIDEMENT ORTHO Bilateral 07/31/2020    Procedure: IRRIGATION AND DEBRIDEMENT, WOUND - KNEE;  Surgeon: Roosevelt Saucedo M.D.;  Location: SURGERY [facility];  Service: Orthopedics    HEMICOLECTOMY Right 12/13/2018    Procedure: OPEN RIGHT HEMICOLECTOMY;  Surgeon: Dash Bhagat M.D.;  Location: SURGERY [facility];  Service: General    INGUINAL HERNIA REPAIR Right 09/23/2016    Procedure: INGUINAL HERNIA REPAIR - PRIMARY;  Surgeon: Mary Medina M.D.;  Location: SURGERY [facility];  Service:     OTHER  08/12/2014    peroneal nerve surgery Dr. Castro     OTHER ORTHOPEDIC SURGERY  2011    meniscus repair    ARTHROPLASTY Left     hip replacement    CHOLECYSTECTOMY      HYSTERECTOMY, TOTAL ABDOMINAL  1970's    KNEE ARTHROPLASTY TOTAL Left     NO PERTINENT PAST SURGICAL HISTORY  2022    Skin Cancer, Lymph nodes removed    OTHER Left     Upper chest, skin cancer removed    ROTATOR CUFF REPAIR Bilateral        FHX:  Family History   Problem Relation Age of Onset    Cancer Mother         stomach- ruptured appendix    Cancer Father         colon     "Leukemia Father         many exposure, worked for [employer]     Heart FiftyThree Sister     Heart Disease Brother         s/p stent     Other Son         on blood thinner, unclear etiology    Clotting Disorder Neg Hx        Medications:  Current Facility-Administered Medications   Medication Dose    cefTRIAXone (Rocephin) syringe 2,000 mg  2,000 mg    NS infusion      amLODIPine (Norvasc) tablet 2.5 mg  2.5 mg    atorvastatin (Lipitor) tablet 20 mg  20 mg    losartan (Cozaar) tablet 50 mg  50 mg    [Held by provider] rivaroxaban (Xarelto) tablet 20 mg  20 mg    acetaminophen (Tylenol) tablet 650 mg  650 mg       Allergies:  Allergies   Allergen Reactions    Wound Dressing Adhesive      Pt says band-aids cause skin reaction/rash if on for more than 2 days  Paper tape OK           Physical Exam:  Vitals: /58   Pulse 70   Temp 36.6 °C (97.8 °F) (Temporal)   Resp 18   Ht 1.575 m (5' 2\")   Wt 79.5 kg (175 lb 4.3 oz)   SpO2 96%   Gen: NAD, laying comfortably in bed, no family at bedside   Head:  NC/AT  Eyes/ Nose/ Mouth: PERRLA, moist mucous membranes  Cardio: RRR, good distal perfusion, warm extremities  Pulm: normal respiratory effort, no cyanosis, on 2 L o 2   Abd: Soft NTND, negative borborygmi   Ext: No peripheral edema. No calf tenderness. No clubbing.    Mental status: answers questions appropriately follows commands, oriented to person and place   Speech: fluent, no aphasia or dysarthria    CRANIAL NERVES:  2,3: visual acuity grossly intact, PERRL  3,4,6: EOMI bilaterally, no nystagmus or diplopia  5: sensation intact to light touch bilaterally and symmetric  7: no facial asymmetry  8: hearing grossly intact      Motor: 5/5 b/l  strength, 4/5 EE on LUE   5/5 b/l DF, PF   Fatigued with minimal MMT       Sensory:   intact to light touch through out    DTRs: 2+ in bilateral  biceps  No clonus at bilateral ankles  Negative Stern b/l     Tone: no spasticity noted, no cogwheeling " noted    Coordination:   intact finger to nose bilaterally  intact fine motor with fingers bilaterally      Labs: Reviewed and significant for   Recent Labs     24  0103 24  0035 24  0129   RBC 4.18* 3.96* 3.78*   HEMOGLOBIN 12.5 11.4* 11.3*   HEMATOCRIT 37.1 34.6* 33.7*   PLATELETCT 162* 173 206     Recent Labs     24  0103 24  0035 24  0129   SODIUM 134* 133* 135   POTASSIUM 4.1 3.8 3.5*   CHLORIDE 104 103 106   CO2 17* 17* 17*   GLUCOSE 120* 111* 114*   BUN 12 13 13   CREATININE 0.39* 0.51 0.39*   CALCIUM 6.8* 7.1* 7.0*     Recent Results (from the past 24 hour(s))   EKG    Collection Time: 24 12:57 AM   Result Value Ref Range    Report       [facility]    Test Date:  2024  Pt Name:    SHAILESH BYRD               Department: [facility]  MRN:        [id number]                      Room:       [facility]  Gender:     Female                       Technician: GRACY  :        [id number]                   Requested By:XU FONSECA  Order #:    [id number]                    Reading MD:    Measurements  Intervals                                Axis  Rate:       78                           P:          0  CO:         0                            QRS:        -40  QRSD:       148                          T:          20  QT:         443  QTc:        505    Interpretive Statements  Atrial fibrillation  RBBB and LAFB  Left ventricular hypertrophy  Prolonged QT interval  Compared to ECG 2024 23:03:19  Left anterior fascicular block now present  Prolonged QT interval now present  Sinus rhythm no longer present  Intraventricular conduction delay no longer present  Early repolarization no longer present     CBC WITH DIFFERENTIAL    Collection Time: 24  1:29 AM   Result Value Ref Range    WBC 11.5 (H) 4.8 - 10.8 K/uL    RBC 3.78 (L) 4.20 - 5.40 M/uL    Hemoglobin 11.3 (L) 12.0 - 16.0 g/dL    Hematocrit 33.7 (L) 37.0 - 47.0 %    MCV 89.2 81.4 - 97.8 fL    MCH 29.9 27.0 - 33.0 pg     MCHC 33.5 32.2 - 35.5 g/dL    RDW 47.6 35.9 - 50.0 fL    Platelet Count 206 164 - 446 K/uL    MPV 10.5 9.0 - 12.9 fL    Neutrophils-Polys 82.50 (H) 44.00 - 72.00 %    Lymphocytes 7.80 (L) 22.00 - 41.00 %    Monocytes 7.90 0.00 - 13.40 %    Eosinophils 1.20 0.00 - 6.90 %    Basophils 0.30 0.00 - 1.80 %    Immature Granulocytes 0.30 0.00 - 0.90 %    Nucleated RBC 0.00 0.00 - 0.20 /100 WBC    Neutrophils (Absolute) 9.44 (H) 1.82 - 7.42 K/uL    Lymphs (Absolute) 0.90 (L) 1.00 - 4.80 K/uL    Monos (Absolute) 0.91 (H) 0.00 - 0.85 K/uL    Eos (Absolute) 0.14 0.00 - 0.51 K/uL    Baso (Absolute) 0.04 0.00 - 0.12 K/uL    Immature Granulocytes (abs) 0.04 0.00 - 0.11 K/uL    NRBC (Absolute) 0.00 K/uL   Comp Metabolic Panel    Collection Time: 03/30/24  1:29 AM   Result Value Ref Range    Sodium 135 135 - 145 mmol/L    Potassium 3.5 (L) 3.6 - 5.5 mmol/L    Chloride 106 96 - 112 mmol/L    Co2 17 (L) 20 - 33 mmol/L    Anion Gap 12.0 7.0 - 16.0    Glucose 114 (H) 65 - 99 mg/dL    Bun 13 8 - 22 mg/dL    Creatinine 0.39 (L) 0.50 - 1.40 mg/dL    Calcium 7.0 (L) 8.5 - 10.5 mg/dL    Correct Calcium 8.1 (L) 8.5 - 10.5 mg/dL    AST(SGOT) 61 (H) 12 - 45 U/L    ALT(SGPT) 55 (H) 2 - 50 U/L    Alkaline Phosphatase 67 30 - 99 U/L    Total Bilirubin 0.4 0.1 - 1.5 mg/dL    Albumin 2.6 (L) 3.2 - 4.9 g/dL    Total Protein 5.5 (L) 6.0 - 8.2 g/dL    Globulin 2.9 1.9 - 3.5 g/dL    A-G Ratio 0.9 g/dL   ESTIMATED GFR    Collection Time: 03/30/24  1:29 AM   Result Value Ref Range    GFR (CKD-EPI) 96 >60 mL/min/1.73 m 2         ASSESSMENT:  Patient is a 88 y.o. female admitted with encephalopathy due to cardio embolic stroke     [facility] Code / Diagnosis to Support: 0001.1 - Stroke: Left Body Involvement (Right Brain)  See DISPO details below for recommendations on appropriate level of rehab for this diagnosis.    Barriers to transfer include: Insurance authorization, TCCs to verify disposition, medical clearance and bed availability     Assessment and  Plan:  Encephalopathy   Cardio embolic stroke   - admitted after found down with AMS   - admission CT head showed age indeterminate bilateral cerebellar hemipsheres   - code stroke called on 3/28 after worsening AMS   - repeat CT head negative for acute findings, CTA head showed R M3 stenosis vs nonclussive clot   - MRI brain obtained showed multiple foci of acute infarct scattered throughout the bilateral cerebral hemispheres with largest cluster of acute infarctions in the R MCA territory   - 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, on statin   - xarelto currentl held   - not currently able to tolerate PT/OT, therefore not currently a candidate for IRF     Endocarditis   Bacteremia   - blood cultures + for strep gordonii (oral biofilm)   - ID following   - CNS dosing of ceftriaxone switching to IV penicillin q4hrs, anticipated to need 4-6 weeks IV abx   - echo showed mitral valve mobile mass   - planning for VINAY on Monday , and anticipated need for full spine MRI     S/p TAVR   - done in stepember 2023     Hx of DVT   - previously on xarelto at home, currently held per recs from neurology       DISPO:  - patient is currently functioning below their level of baseline, recommend post acute rehab  - was Total A for tasks with PT, at current level patient would SNF level rehab due to poor endurance   - likely to need to SNF for long term access to IV abx for at least 4-6 weeks  - PM&R will sign off       Medical Complexity:  Encephalopathy   Cardio embolic stroke   Endocarditis   Bacteremia   S/p TAVR   Hx of DVT   Impaired mobility and ADLs     DVT PPX: SCDs       Thank you for allowing us to participate in the care of this patient.     Patient was seen for 81 minutes on unit/floor of which > 50% of time was spent on counseling and coordination of care regarding the above, including prognosis, risk reduction, benefits of treatment, and options for next stage of care.    Kathy Leon D.O.   Physical Medicine  and Rehabilitation     Please note that this dictation was created using voice recognition software. I have made every reasonable attempt to correct obvious errors, but there may be errors of grammar and possibly content that I did not discover before finalizing the note.

## 2024-03-30 NOTE — PROGRESS NOTES
NOC HOSPITALIST CROSS COVER    Notified by RN regarding patient converting into rate controlled A-fib, rates in the 70s.  Chart review demonstrates no prior history of A-fib.  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  Her anticoagulation is on hold due to high suspicion for septic emboli from mitral vegetations.  Xarelto will be held until cleared by ID.    Vitals:    03/30/24 0436   BP: (!) 149/92   Pulse: 90   Resp: 18   Temp: 36.8 °C (98.2 °F)   SpO2: 97%          Plan:  # A-fib  -Currently rate controlled  -Anticoagulation held pending ID clearance and results of VINAY  -Cardiology following        -----------------------------------------------------------------------------------------------------------    Electronically signed by:  Renetta Hart, WENYD, APRN, TRENTP-BC  Hospitalist Services

## 2024-03-30 NOTE — CARE PLAN
The patient is Watcher    Shift Goals  Clinical Goals: neuro status, Q2 turns  Patient Goals: rest  Family Goals: patient to rest    Progress made toward(s) clinical / shift goals:    Problem: Knowledge Deficit - Standard  Goal: Patient and family/care givers will demonstrate understanding of plan of care, disease process/condition, diagnostic tests and medications  Description: Target End Date:  1-3 days or as soon as patient condition allows    Document in Patient Education    1.  Patient and family/caregiver oriented to unit, equipment, visitation policy and means for communicating concern  2.  Complete/review Learning Assessment  3.  Assess knowledge level of disease process/condition, treatment plan, diagnostic tests and medications  4.  Explain disease process/condition, treatment plan, diagnostic tests and medications  Outcome: Progressing     Problem: Fall Risk  Goal: Patient will remain free from falls  Description: Target End Date:  Prior to discharge or change in level of care    Document interventions on the Jessica Rodríguez Fall Risk Assessment    1.  Assess for fall risk factors  2.  Implement fall precautions  Outcome: Progressing     Problem: Skin Integrity  Goal: Skin integrity is maintained or improved  Description: Target End Date:  Prior to discharge or change in level of care    Document interventions on Skin Risk/Edwin flowsheet groups and corresponding LDA    1.  Assess and monitor skin integrity, appearance and/or temperature  2.  Assess risk factors for impaired skin integrity and/or pressures ulcers  3.  Implement precautions to protect skin integrity in collaboration with interdisciplinary team  4.  Implement pressure ulcer prevention protocol if at risk for skin breakdown  5.  Confirm wound care consult if at risk for skin breakdown  6.  Ensure patient use of pressure relieving devices  (Low air loss bed, waffle overlay, heel protectors, ROHO cushion, etc)  Outcome: Progressing  Note:  Appropriate interventions in place to protect skin. Pt on q2 turns with purewick for urinary elimination. Bed changes PRN. Extra pillows for positioning.

## 2024-03-30 NOTE — PROGRESS NOTES
Monitor summary: SR/2nd degree type I, HR 77-86, [place] 0.22, QRS 0.08, QT 0.38 with (R) and (O) PVC per strip from monitor room

## 2024-03-30 NOTE — CARE PLAN
The patient is Watcher - Medium risk of patient condition declining or worsening    Shift Goals  Clinical Goals: neuro exam  Patient Goals: wilmer  Family Goals: wilmer    Progress made toward(s) clinical / shift goals:  neuro status stable  Problem: Fall Risk  Goal: Patient will remain free from falls  Outcome: Progressing     Problem: Skin Integrity  Goal: Skin integrity is maintained or improved  Outcome: Progressing     Problem: Neuro Status  Goal: Neuro status will remain stable or improve  Outcome: Not Progressing       Patient is not progressing towards the following goals:

## 2024-03-31 VITALS
BODY MASS INDEX: 32.25 KG/M2 | RESPIRATION RATE: 18 BRPM | SYSTOLIC BLOOD PRESSURE: 128 MMHG | HEIGHT: 62 IN | HEART RATE: 78 BPM | WEIGHT: 175.27 LBS | DIASTOLIC BLOOD PRESSURE: 68 MMHG | OXYGEN SATURATION: 95 % | TEMPERATURE: 98.1 F

## 2024-03-31 LAB
ALBUMIN SERPL BCP-MCNC: 2.5 G/DL (ref 3.2–4.9)
ALBUMIN/GLOB SERPL: 0.8 G/DL
ALP SERPL-CCNC: 69 U/L (ref 30–99)
ALT SERPL-CCNC: 76 U/L (ref 2–50)
ANION GAP SERPL CALC-SCNC: 11 MMOL/L (ref 7–16)
AST SERPL-CCNC: 75 U/L (ref 12–45)
BILIRUB SERPL-MCNC: 0.3 MG/DL (ref 0.1–1.5)
BUN SERPL-MCNC: 11 MG/DL (ref 8–22)
CALCIUM ALBUM COR SERPL-MCNC: 8.4 MG/DL (ref 8.5–10.5)
CALCIUM SERPL-MCNC: 7.2 MG/DL (ref 8.5–10.5)
CHLORIDE SERPL-SCNC: 108 MMOL/L (ref 96–112)
CO2 SERPL-SCNC: 17 MMOL/L (ref 20–33)
CREAT SERPL-MCNC: 0.43 MG/DL (ref 0.5–1.4)
EKG IMPRESSION: NORMAL
ERYTHROCYTE [DISTWIDTH] IN BLOOD BY AUTOMATED COUNT: 46.4 FL (ref 35.9–50)
GFR SERPLBLD CREATININE-BSD FMLA CKD-EPI: 94 ML/MIN/1.73 M 2
GLOBULIN SER CALC-MCNC: 3.3 G/DL (ref 1.9–3.5)
GLUCOSE BLD STRIP.AUTO-MCNC: 111 MG/DL (ref 65–99)
GLUCOSE SERPL-MCNC: 117 MG/DL (ref 65–99)
HCT VFR BLD AUTO: 33.7 % (ref 37–47)
HGB BLD-MCNC: 11.2 G/DL (ref 12–16)
MCH RBC QN AUTO: 29.5 PG (ref 27–33)
MCHC RBC AUTO-ENTMCNC: 33.2 G/DL (ref 32.2–35.5)
MCV RBC AUTO: 88.7 FL (ref 81.4–97.8)
PLATELET # BLD AUTO: 197 K/UL (ref 164–446)
PMV BLD AUTO: 10.1 FL (ref 9–12.9)
POTASSIUM SERPL-SCNC: 3.7 MMOL/L (ref 3.6–5.5)
PROT SERPL-MCNC: 5.8 G/DL (ref 6–8.2)
RBC # BLD AUTO: 3.8 M/UL (ref 4.2–5.4)
SODIUM SERPL-SCNC: 136 MMOL/L (ref 135–145)
WBC # BLD AUTO: 9.2 K/UL (ref 4.8–10.8)

## 2024-03-31 PROCEDURE — 99233 SBSQ HOSP IP/OBS HIGH 50: CPT | Performed by: INTERNAL MEDICINE

## 2024-03-31 PROCEDURE — 93005 ELECTROCARDIOGRAM TRACING: CPT | Performed by: STUDENT IN AN ORGANIZED HEALTH CARE EDUCATION/TRAINING PROGRAM

## 2024-03-31 PROCEDURE — A9270 NON-COVERED ITEM OR SERVICE: HCPCS | Performed by: STUDENT IN AN ORGANIZED HEALTH CARE EDUCATION/TRAINING PROGRAM

## 2024-03-31 PROCEDURE — 700102 HCHG RX REV CODE 250 W/ 637 OVERRIDE(OP): Performed by: STUDENT IN AN ORGANIZED HEALTH CARE EDUCATION/TRAINING PROGRAM

## 2024-03-31 PROCEDURE — 36415 COLL VENOUS BLD VENIPUNCTURE: CPT

## 2024-03-31 PROCEDURE — 700111 HCHG RX REV CODE 636 W/ 250 OVERRIDE (IP): Mod: JZ

## 2024-03-31 PROCEDURE — 85027 COMPLETE CBC AUTOMATED: CPT

## 2024-03-31 PROCEDURE — 700105 HCHG RX REV CODE 258

## 2024-03-31 PROCEDURE — 97535 SELF CARE MNGMENT TRAINING: CPT

## 2024-03-31 PROCEDURE — 93010 ELECTROCARDIOGRAM REPORT: CPT | Performed by: STUDENT IN AN ORGANIZED HEALTH CARE EDUCATION/TRAINING PROGRAM

## 2024-03-31 PROCEDURE — 82962 GLUCOSE BLOOD TEST: CPT

## 2024-03-31 PROCEDURE — 97112 NEUROMUSCULAR REEDUCATION: CPT

## 2024-03-31 PROCEDURE — 700105 HCHG RX REV CODE 258: Performed by: INTERNAL MEDICINE

## 2024-03-31 PROCEDURE — 97110 THERAPEUTIC EXERCISES: CPT

## 2024-03-31 PROCEDURE — 99232 SBSQ HOSP IP/OBS MODERATE 35: CPT | Performed by: STUDENT IN AN ORGANIZED HEALTH CARE EDUCATION/TRAINING PROGRAM

## 2024-03-31 PROCEDURE — 80053 COMPREHEN METABOLIC PANEL: CPT

## 2024-03-31 PROCEDURE — 770020 HCHG ROOM/CARE - TELE (206)

## 2024-03-31 PROCEDURE — 700105 HCHG RX REV CODE 258: Performed by: STUDENT IN AN ORGANIZED HEALTH CARE EDUCATION/TRAINING PROGRAM

## 2024-03-31 RX ORDER — DEXTROSE AND SODIUM CHLORIDE 5; .45 G/100ML; G/100ML
INJECTION, SOLUTION INTRAVENOUS CONTINUOUS
Status: DISCONTINUED | OUTPATIENT
Start: 2024-03-31 | End: 2024-04-03

## 2024-03-31 RX ADMIN — SODIUM CHLORIDE: 9 INJECTION, SOLUTION INTRAVENOUS at 09:47

## 2024-03-31 RX ADMIN — SODIUM CHLORIDE 3 MILLION UNITS: 9 INJECTION, SOLUTION INTRAVENOUS at 10:30

## 2024-03-31 RX ADMIN — SODIUM CHLORIDE 3 MILLION UNITS: 9 INJECTION, SOLUTION INTRAVENOUS at 14:24

## 2024-03-31 RX ADMIN — LOSARTAN POTASSIUM 50 MG: 50 TABLET, FILM COATED ORAL at 05:06

## 2024-03-31 RX ADMIN — SODIUM CHLORIDE 3 MILLION UNITS: 9 INJECTION, SOLUTION INTRAVENOUS at 22:10

## 2024-03-31 RX ADMIN — DEXTROSE AND SODIUM CHLORIDE: 5; 450 INJECTION, SOLUTION INTRAVENOUS at 15:59

## 2024-03-31 RX ADMIN — SODIUM CHLORIDE 3 MILLION UNITS: 9 INJECTION, SOLUTION INTRAVENOUS at 05:03

## 2024-03-31 RX ADMIN — SODIUM CHLORIDE 3 MILLION UNITS: 9 INJECTION, SOLUTION INTRAVENOUS at 17:59

## 2024-03-31 ASSESSMENT — ENCOUNTER SYMPTOMS
FEVER: 0
NAUSEA: 0
SENSORY CHANGE: 1
ABDOMINAL PAIN: 0
VOMITING: 0
SHORTNESS OF BREATH: 0
MYALGIAS: 0
FALLS: 1
DEPRESSION: 0
CHILLS: 0
HEADACHES: 0
WEAKNESS: 1

## 2024-03-31 ASSESSMENT — COGNITIVE AND FUNCTIONAL STATUS - GENERAL
DRESSING REGULAR UPPER BODY CLOTHING: TOTAL
HELP NEEDED FOR BATHING: TOTAL
PERSONAL GROOMING: A LOT
EATING MEALS: A LOT
DAILY ACTIVITIY SCORE: 8
TOILETING: TOTAL
SUGGESTED CMS G CODE MODIFIER DAILY ACTIVITY: CM
DRESSING REGULAR LOWER BODY CLOTHING: TOTAL

## 2024-03-31 ASSESSMENT — PAIN DESCRIPTION - PAIN TYPE: TYPE: ACUTE PAIN

## 2024-03-31 NOTE — PROGRESS NOTES
Assumed care of pt this morning. Pt is on 2L, room air baseline. Pt is arousable to voice but drowsy. Minimal participation in assessment at this time. Reportedly her mentation waxes and wanes throughout day. SCDs and heel float boots in place.  on. Purewick in place. Bed alarm on. Tolerating a few bites from son this morning. Neuro contacted to update son at bedside.

## 2024-03-31 NOTE — PROGRESS NOTES
Offered patient something to eat, able to take 35 spoon of apple juice and 2 spoon of apple sauce . Turned patient side to side, complete linen changed. Kept patient dry and intact. Peg G 3 millions 100ml IVPB started. All fall precaution in placed. Call light within reach

## 2024-03-31 NOTE — THERAPY
Occupational Therapy  Daily Treatment     Patient Name: Marry Mathur  Age:  88 y.o., Sex:  female  Medical Record #: [id number]  Today's Date: 3/31/2024     Precautions: Fall Risk, Swallow Precautions    Assessment  Pt seen for OT tx session and slowly progressing towards goals. Son at bedside, very supportive and receptive to education on L arm positioning to prevent subluxation, AAROM exercises in supine, and neuro muscular recovery. Pt following commands with delayed responses, difficulty verbally communicating when working on postural control but able to state yes/no. Pt limited by LUE weakness>RUE, poor trunk control, impaired cognition, and poor endurance. Will continue to follow for skilled OT services.    Plan    Treatment Plan Status: (P) Continue Current Treatment Plan  Type of Treatment: Self Care / Activities of Daily Living, Therapeutic Activity, Therapeutic Exercises  Treatment Frequency: 3 Times per Week  Treatment Duration: Until Therapy Goals Met    DC Equipment Recommendations: (P) Unable to determine at this time  Discharge Recommendations: (P) Recommend post-acute placement for additional occupational therapy services prior to discharge home       03/31/24 0932   Cognition    Comments delayed reponses but following commands, difficulty dual tasking (i.e. verbalizing and working on postural contro) son reports pt verbally communicates with him at rest   Active ROM Upper Body   Active ROM Upper Body  X   Comments limited by weakness   Strength Upper Body   Upper Body Strength  X   Comments LUE weakness>RUE   Sensation Upper Body   Upper Extremity Sensation  WDL   Supine Upper Body Exercises   Supine Upper Body Exercises Yes   Comments training to son on supine BUE exercises with postural support   Sitting Upper Body Exercises   Sitting Upper Body Exercises Yes   Side Arm Raise 1 set of 10   Bicep Curls 1 set of 10   Elbow Extension 1 set of 10   Comments AAROM EOB   Neuro-Muscular  Treatments   Neuro-Muscular Treatments Postural Facilitation;Weight Shift Right;Weight Shift Left   Comments WB via R and L UE, able to initate back to center   Other Treatments   Other Treatments Provided Training to son on L arm positioning and protection to prevent subuxation   Balance   Sitting Balance (Static) Poor +   Sitting Balance (Dynamic) Trace +   Standing Balance (Dynamic) Poor   Weight Shift Sitting Poor   Skilled Intervention Verbal Cuing;Tactile Cuing;Postural Facilitation   Bed Mobility    Supine to Sit Total Assist   Sit to Supine Total Assist   Scooting Maximal Assist   Skilled Intervention Verbal Cuing;Tactile Cuing;Sequencing   Activities of Daily Living   Grooming Maximal Assist;Seated  (washing face seated EOB)   Lower Body Dressing Maximal Assist   Skilled Intervention Verbal Cuing;Tactile Cuing;Compensatory Strategies   Comments [place] for face washing and hair care EOB   How much help from another person does the patient currently need...   6 Clicks Daily Activity Score 8   Patient / Family Goals   Patient / Family Goal #1 to get back home   Short Term Goals   Short Term Goal # 1 Pt will complete functional transfers supervised   Goal Outcome # 1 Goal not met   Short Term Goal # 2 Pt will complete toileting [place]   Goal Outcome # 2 Goal not met   Short Term Goal # 3 Pt will complete UB/LB dressing supervised with A/E as needed   Goal Outcome # 3 Goal not met   Education Group   Education Provided Upper Extremity Range of Motion   Role of Occupational Therapist Patient Response Patient;Acceptance;Demonstration;Reinforcement Needed   Upper Ext ROM Patient Response Patient;Acceptance;Explanation   ADL Patient Response Patient;Acceptance;Demonstration;Reinforcement Needed   Occupational Therapy Treatment Plan    O.T. Treatment Plan Continue Current Treatment Plan   Anticipated Discharge Equipment and Recommendations   DC Equipment Recommendations Unable to determine at this time   Discharge  Recommendations Recommend post-acute placement for additional occupational therapy services prior to discharge home

## 2024-03-31 NOTE — PROGRESS NOTES
Monitor called with possible a flutter vs 2DT2 heart block. MD notified. VSS. Stat EKG ordered, EKG tech called. No further orders at this time.

## 2024-03-31 NOTE — PROGRESS NOTES
Monitor summary: SR, HR 71-82, [place] 0.20, QRS 0.10, QT 0.44 with (R) PVCs and PACs per strip from monitor room

## 2024-03-31 NOTE — CARE PLAN
The patient is Stable - Low risk of patient condition declining or worsening    Shift Goals  Clinical Goals: neuro status, Q2 turns  Patient Goals: comfort, rest  Family Goals: TIM    Progress made toward(s) clinical / shift goals:        Problem: Fall Risk  Goal: Patient will remain free from falls  3/31/2024 0629 by Osei Titus RRosaliaNRosalia  Outcome: Progressing  Note: Patient remained free from falls. All fall precautions in place. Patient educated the need to call when needed assistance, patient verbalized understanding. Call light and personal belongings within reach.   3/31/2024 0627 by Osei Titus R.N.  Outcome: Progressing     Problem: Skin Integrity  Goal: Skin integrity is maintained or improved  3/31/2024 0628 by Osei Titus R.N.  Outcome: Progressing  Note: Q2 turns., barrier cream applied on affected areas, cushion bony prominences  3/31/2024 0627 by Osei Titus R.NRosalia  Outcome: Progressing  Note: Q 2 turns, barrier cream applied. BETSY mattress in placed. Cushion bony prominences.

## 2024-03-31 NOTE — PROGRESS NOTES
Infectious Disease Progress Note    Author: Giancarlo Ervin M.D. Date & Time of service: 3/31/2024  8:26 AM    Chief Complaint:  Follow-up for bacteremia    Interval History:  3/30 Tmax 99.3, white count down to 11.5, tolerated switch to ceftriaxone, cultures as below, creatinine 0.39, mildly elevated transaminases,   3/31 patient is afebrile, white count down to 9.2, tolerated switch to penicillin, creatinine 0.43, mildly elevated transaminases stable, cultures as below    Labs Reviewed and Medications Reviewed.    Review of Systems:  Review of Systems   Unable to perform ROS: Medical condition       Hemodynamics:  Temp (24hrs), Av.7 °C (98.1 °F), Min:36.1 °C (97 °F), Max:37.2 °C (99 °F)  Temperature: 37.1 °C (98.8 °F)  Pulse  Av.4  Min: 48  Max: 97   Blood Pressure : 134/68       Physical Exam:  Physical Exam  Vitals and nursing note reviewed.   Constitutional:       General: She is not in acute distress.     Appearance: She is ill-appearing.      Comments: Asleep, snoring, did not awaken with light stimuli   HENT:      Mouth/Throat:      Pharynx: No oropharyngeal exudate.   Cardiovascular:      Rate and Rhythm: Normal rate.   Pulmonary:      Effort: Pulmonary effort is normal. No respiratory distress.      Breath sounds: No stridor.   Abdominal:      General: There is no distension.   Musculoskeletal:         General: No swelling or tenderness.   Skin:     Findings: No erythema or rash.         Meds:    Current Facility-Administered Medications:     penicillin g    NS    amLODIPine    atorvastatin    losartan    [Held by provider] rivaroxaban    acetaminophen    Labs:  Recent Labs     24  0035 24  0129 24  0108   WBC 18.6* 11.5* 9.2   RBC 3.96* 3.78* 3.80*   HEMOGLOBIN 11.4* 11.3* 11.2*   HEMATOCRIT 34.6* 33.7* 33.7*   MCV 87.4 89.2 88.7   MCH 28.8 29.9 29.5   RDW 46.5 47.6 46.4   PLATELETCT 173 206 197   MPV 10.5 10.5 10.1   NEUTSPOLYS  --  82.50*  --    LYMPHOCYTES  --  7.80*   --    MONOCYTES  --  7.90  --    EOSINOPHILS  --  1.20  --    BASOPHILS  --  0.30  --      Recent Labs     03/29/24  0035 03/30/24  0129 03/31/24  0108   SODIUM 133* 135 136   POTASSIUM 3.8 3.5* 3.7   CHLORIDE 103 106 108   CO2 17* 17* 17*   GLUCOSE 111* 114* 117*   BUN 13 13 11     Recent Labs     03/29/24  0035 03/30/24  0129 03/31/24  0108   ALBUMIN 2.8* 2.6* 2.5*   TBILIRUBIN 0.5 0.4 0.3   ALKPHOSPHAT 69 67 69   TOTPROTEIN 5.7* 5.5* 5.8*   ALTSGPT 34 55* 76*   ASTSGOT 33 61* 75*   CREATININE 0.51 0.39* 0.43*       Imaging:  CT-HEAD W/O    Result Date: 3/29/2024  3/29/2024 3:36 PM HISTORY/REASON FOR EXAM:  Acute change in mental status, MRI with embolic strokes. TECHNIQUE/EXAM DESCRIPTION AND NUMBER OF VIEWS: CT of the head without contrast. The study was performed on a helical multidetector CT scanner. Contiguous axial sections were obtained from the skull base through the vertex. Up to date radiation dose reduction adjustments have been utilized to meet ALARA standards for radiation dose reduction. COMPARISON:  MRI brain without and with IV contrast, 3/28/2024. FINDINGS: There is age-related central and cortical atrophy. Diffuse chronic migraines or pelvic white matter changes are evident. There are patchy areas of decreased attenuation at the gray-white matter junctions, compatible with known sites of emboli on the MRI of the brain without and with IV contrast. There is no hemorrhagic transformation. No extra-axial fluid collections. There are postsurgical changes in the globes. Paranasal sinuses, middle ear cavities and mastoid air cells are clear. Calvarium is intact. No shift in midline structures. Basilar and perimesencephalic cisterns are preserved.     1. Stable patchy areas of decreased attenuation at the gray-white matter junctions in both cerebral hemispheres and cerebellar hemispheres when compared with the prior MRI of the brain performed on 3/28/2024. 2. No hemorrhagic transformation. 3. No other  acute findings.     EC-ECHOCARDIOGRAM COMPLETE W/O CONT    Result Date: 3/29/2024  Transthoracic Echo Report Echocardiography Laboratory CONCLUSIONS Hyperdynamic left ventricular systolic function. Visually estimated ejection fraction is 70-75 %. The right ventricle is mildly dilated. Normal right ventricular systolic function. 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 Mild mitral stenosis. Mean transvalvular gradient is 4 mmHg at a heart rate of 72 BPM. Mild mitral regurgitation. 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 Right ventricular systolic pressure is estimated to be 34 mmHg (normal). Compared to the prior study on 10/23/2023, mass on mitral valve is more clearly visualized in this study.  Correlate clinically Primary team is notified of findings. SHAILESH BYRD Exam Date:         2024                    10:21 Exam Location:     Inpatient Priority:          Stat Ordering Physician:        LINK CHURCHILL Referring Physician:       [id number] Sonographer:               Lexie Salmeron RISHI Age:    88     Gender:    F MRN:    [id number] :    [id number] BSA:    1.75   Ht (in):    62     Wt (lb):    163 Exam Type:     Complete Indications:     Endocarditis ICD Codes:       421 CPT Codes:       82469 BP:   145    /   78     HR:   72 Technical Quality:       Good MEASUREMENTS  (Male / Female) Normal Values 2D ECHO LV Diastolic Diameter PLAX        4.9 cm                4.2 - 5.9 / 3.9 - 5.3 cm LV Systolic Diameter PLAX         2.7 cm                2.1 - 4.0 cm IVS Diastolic Thickness           1.1 cm                LVPW Diastolic Thickness          1.3 cm                LVOT Diameter                     1.8 cm                DOPPLER AV Peak Velocity                  2.4 m/s               AV Peak Gradient                  22.1 mmHg             AV Mean Gradient                  12 mmHg               LVOT Peak Velocity                 1 m/s                 AV Area Cont Eq vti               1.4 cm2               MV Velocity Time Integral         39.8 cm               MV Pressure Half Time             77.4 ms               MV Area PHT                       2.8 cm2               TR Peak Velocity                  265 cm/s              PV Peak Velocity                  0.96 m/s              PV Peak Gradient                  3.7 mmHg              * Indicates values subject to auto-interpretation LV EF:        % FINDINGS Left Ventricle Normal left ventricular chamber size. Normal left ventricular wall thickness. Hyperdynamic left ventricular systolic function. Visually estimated ejection fraction is 70-75 %. No regional wall motion abnormalities. A reliable estimation of diastolic function cannot be made due to mitral valve disease. Right Ventricle The right ventricle is mildly dilated. Normal right ventricular systolic function. Right Atrium Normal right atrial size. Normal inferior vena cava size and inspiratory collapse. Left Atrium Moderately dilated left atrium. Left atrial volume index is 45 mL/sq m. Mitral Valve Mobile mass noted on mitral valve (posterior leaflet). Mild mitral stenosis. Mean transvalvular gradient is 4 mmHg at a heart rate of 72 BPM. Mild mitral regurgitation. Aortic Valve Known TAVR aortic valve  that is functioning normally with normal transvalvular gradients. Transvalvular gradients are - Peak: 25 mmHg,  Mean: 14 mmHg. No evidence of paravalvular leak. Tricuspid Valve Structurally normal tricuspid valve. No tricuspid stenosis. Trace tricuspid regurgitation. Right atrial pressure is estimated to be 3 mmHg. Right ventricular systolic pressure is estimated to be 34 mmHg. Pulmonic Valve The pulmonic valve is not well visualized. No pulmonic stenosis. Mild pulmonic insufficiency. Pericardium No pericardial effusion. Aorta The ascending aorta diameter is 3.3 cm. Iman Zhong MD (Electronically Signed) Final Date:     29  March 2024                 11:55    MR-BRAIN-WITH & W/O    Result Date: 3/29/2024  3/28/2024 3:46 PM HISTORY/REASON FOR EXAM: questinable seizure Altered level of consciousness TECHNIQUE/EXAM DESCRIPTION: T1 sagittal, T2 axial, flair coronal, T1 coronal, and diffusion-weighted axial images were obtained of the brain pre-contrast followed by T1 coronal and axial images post intravenous administration of 15 mL ProHance. COMPARISON: CT brain dated 3/26/2024 FINDINGS: Multiple small foci of acute infarction are noted in the bilateral cerebellum, brainstem involving the left chris, the right midbrain posteriorly, bilateral external capsular regions, right insular region, right frontal region, left medial frontal region,  bilateral parietal cortex and subcortical region, left corpus callosum and bilateral high parietal and frontal convexity cortex and subcortical region. Intracranial flow voids are normal. Gradient-echo images do not demonstrate any evidence of hemorrhage. Postcontrast images do not demonstrate any abnormal intracranial enhancement. Age-related volume loss noted with prominent sulci, cisterns and ventricles. Ventricular dilatation is proportionate to the degree of cerebral volume loss. There is no abnormal intracranial enhancement. The Sella is within normal limits. The craniocervical junction is within normal limits. Visualized intracranial arterial flow voids are within normal limits. Bone marrow signal in the calvarium is within normal limits. Included portions of the paranasal sinuses are within normal limits. Included portions of the mastoid air cells are within normal limits. Included portions of the orbits are within normal limits     Multiple foci of acute infarction scattered throughout the bilateral cerebral hemispheres, cerebellum and brainstem as described above, suggesting a proximal cardiovascular embolic source. The largest cluster of acute infarctions is noted in the right frontoparietal  region involving the right MCA territory. Age-related volume loss.    CT-CTA NECK WITH & W/O-POST PROCESSING    Result Date: 3/26/2024  3/26/2024 4:35 PM HISTORY/REASON FOR EXAM:  code stroke LEFT-sided weakness, altered mental status. TECHNIQUE/EXAM DESCRIPTION: CT angiogram of the neck with contrast. Postcontrast images were obtained of the neck from the great vessels through the skull base following the power injection of nonionic contrast at 5.0 mL/sec. Thin-section helical images were obtained with overlapping reconstruction interval. Coronal and oblique multiplanar volume reformats were generated. Cervical internal carotid artery percent stenosis is calculated using the standard method according to the NASCET criteria wherein a segment of uniform caliber mid or distal cervical internal carotid is used as the reference denominator. 3D angiographic images were reviewed on PACS.  Maximum intensity projection (MIP) images were generated and reviewed 80 mL of Omnipaque 350 nonionic contrast was injected intravenously. Low dose optimization technique was utilized for this CT exam including automated exposure control and adjustment of the mA and/or kV according to patient size. COMPARISON:  None. FINDINGS: Aortic arch: conventional branching pattern. There is atherosclerotic plaque of the aorta. Right common carotid artery: Patent Right internal carotid artery: Atherosclerotic plaque without significant stenosis (less than 50%). Left common carotid artery is patent. Left internal carotid artery: Atherosclerotic plaque without significant stenosis (less than 50%). The right vertebral artery is patent without dissection or stenosis. The left vertebral artery is patent without dissection or stenosis.  LEFT dominant vertebral artery. Vertebrobasilar confluence: The vertebrobasilar confluence appears normal. Lung apices are clear The soft tissues of the neck are within normal limits. Multilevel degenerative changes  cervical spine. 3D angiographic/MIP images of the vasculature confirm the vascular findings as described above.     No focal high-grade stenosis, dissection or occlusion of the cervical carotid or vertebral arteries.    CT-CTA HEAD WITH & W/O-POST PROCESS    Result Date: 3/26/2024  3/26/2024 4:35 PM HISTORY/REASON FOR EXAM:  code stroke.  LEFT-sided weakness, altered mental status. TECHNIQUE/EXAM DESCRIPTION: CT angiogram of the [place] without and with contrast.  Initial precontrast images were obtained of the head from the skull base through the vertex.  Postcontrast images were obtained of the [place] following the power injection of nonionic contrast at 5.0 mL/sec. Thin-section helical images were obtained with overlapping reconstruction interval. Coronal and sagittal multiplanar volume reformats were generated.  3D angiographic images were reviewed on PACS.  Maximum intensity projection (MIP) images were generated and reviewed. 80 mL of Omnipaque 350 nonionic contrast was injected intravenously. Low dose optimization technique was utilized for this CT exam including automated exposure control and adjustment of the mA and/or kV according to patient size. COMPARISON:  CT head 3/26/2024 FINDINGS: The posterior circulation shows the distal vertebral arteries to be patent.  LEFT dominant vertebral artery.  The vertebrobasilar confluence is intact. The basilar artery is patent. No aneurysm or occlusive lesion is evident. Anterior cerebral arteries are normal in caliber and patent.  LEFT middle cerebral artery is patent.  Potential focal narrowing or nonocclusive clot in superior division RIGHT sylvian vessel, M3 segment. 3D angiographic/MIP images of the vasculature confirm the vascular findings as described above.     1.  Short segment decreased enhancement in the RIGHT superior sylvian division of middle superior artery, M3 segment, focal stenosis versus nonocclusive clot. 2.  No intracranial  aneurysm or abrupt large vessel cut off.    CT-HEAD W/O    Result Date: 3/26/2024  3/26/2024 4:40 PM HISTORY/REASON FOR EXAM:  Concern for stroke. TECHNIQUE/EXAM DESCRIPTION AND NUMBER OF VIEWS: CT of the head without contrast. The study was performed on a helical multidetector CT scanner. Contiguous axial sections were obtained from the skull base through the vertex. Up to date radiation dose reduction adjustments have been utilized to meet ALARA standards for radiation dose reduction. COMPARISON:  Noncontrast head CT performed one day prior FINDINGS: Few small scattered parenchymal calcifications suggesting sequela of previous infection or inflammation. Chronic appearing bilateral basal ganglia lacunar infarcts. Hypoattenuating foci in the bilateral cerebellum which are indeterminant. They could be subacute infarcts versus other. Recommend brain MRI workup. Redemonstration of a small focus of low-attenuation in the left parietal deep and subcortical white matter. Possibly gliosis versus subacute infarct. Moderate nonspecific white matter changes. No intracranial mass effect, midline shift, hydrocephalus, hemorrhage. Bilateral maxillary sinus mucosal thickening. Left maxillary sinus mucosal retention cyst. Mastoids in the field of view are unremarkable.     1.  Hypoattenuating foci in the bilateral cerebellum which are indeterminant. They could be subacute infarcts versus other. Recommend brain MRI workup. 2.  Redemonstration of a small focus of low-attenuation in the left parietal deep and subcortical white matter. Possibly gliosis versus subacute infarct. 3.  Findings are stable since the noncontrast head CT performed one day prior.     CT-HEAD W/O    Result Date: 3/25/2024  3/25/2024 4:13 PM HISTORY/REASON FOR EXAM:  ALTERED LEVEL OF CONSCIOUSNESS. Patient found down. TECHNIQUE/EXAM DESCRIPTION AND NUMBER OF VIEWS: CT of the head without contrast. The study was performed on a [employer] CT scanner. Contiguous 5 mm axial  sections were obtained from the skull base through the vertex. Up to date radiation dose reduction adjustments have been utilized to meet ALARA standards for radiation dose reduction. COMPARISON:  None FINDINGS: Small, rounded hypodense areas of the bilateral cerebellar hemispheres. There is a small area of loss of gray-white differentiation in the left parietal lobe. The calvariae and skull base are unremarkable. There are no extraaxial fluid collections. There is a pattern of cerebral atrophy manifest as enlargement of sulcal markings and ventricular prominence.  The ventricular system and basal cisterns are otherwise unremarkable. There are areas of hypodensity in the white matter most consistent with small vessel ischemic change versus demyelination or gliosis. There are no hemorrhagic lesions. There are no mass effects or shift of midline structures. The paranasal sinuses and mastoids in the field of view are unremarkable.     1.  Small, rounded hypodense areas of the bilateral cerebellar hemispheres. These may represent age-indeterminate infarcts. Less likely this could represent edema related to metastatic disease. 2.  There is a small area of loss of gray-white differentiation in the left parietal lobe, possibly an age-indeterminate infarct. 3.  These findings can be further evaluated with contrast-enhanced MRI as indicated. 4.  Cerebral atrophy. 5.  White matter lucencies most consistent with    DX-CHEST-PORTABLE (1 VIEW)    Result Date: 3/25/2024  3/25/2024 2:54 PM HISTORY/REASON FOR EXAM:  Sepsis; sepsis. Altered level of consciousness. TECHNIQUE/EXAM DESCRIPTION AND NUMBER OF VIEWS: Single portable view of the chest. COMPARISON: Chest radiograph, 3/20/2024. FINDINGS: Lungs: No focal opacities were evident. No pleural effusion or visible pneumothorax. Mediastinum: The descending colon post surgical changes of TAVR. Aortic annular calcifications. Calcified atherosclerotic plaques in aorta. Other: Stable  age-related degenerative changes in the spine and shoulders and diffuse decreased bone mineralization.     No acute findings.    DX-CHEST-PORTABLE (1 VIEW)    Result Date: 3/20/2024  3/20/2024 10:49 AM HISTORY/REASON FOR EXAM:  hypoxia TECHNIQUE/EXAM DESCRIPTION AND NUMBER OF VIEWS: Single portable view of the chest. COMPARISON: 9/26/2023 FINDINGS: There is TAVR hardware. HEART: Stable size. There is atherosclerotic calcification in the aortic arch. LUNGS: Lung volumes are low. There is faint LEFT basilar opacity similar to prior. PLEURA: No effusion or pneumothorax.     1.  Persistently enlarged cardiac silhouette 2.  LEFT basilar opacity likely atelectasis rather than pneumonia      Micro:  Results       Procedure Component Value Units Date/Time    Blood Culture - Draw one from central line and one from peripheral site [[id number]]  (Abnormal) Collected: 03/25/24 1425    Order Status: Completed Specimen: Blood from Peripheral Updated: 03/30/24 1026     Significant Indicator POS     Source BLD     Site PERIPHERAL     Culture Result Growth detected by Bactec instrument. 03/26/2024  12:56      Streptococcus gordonii  Please see newest culture for susceptibilities      Narrative:      CALL  Cramer  61 tel. [phone],  CALLED  61 tel. [phone] 03/26/2024, 17:21, RB PERF. RESULTS CALLED TO:  Voalted to Blood Culture Pharmacy, no ID  No site indicated    E-Test [[id number]] Collected: 03/25/24 1540    Order Status: Completed Specimen: Other Updated: 03/30/24 1024     ETEST Sensitivity FINAL    Narrative:      THAD tel. [phone] 03/28/2024, 05:22, RB PERF. RESULTS CALLED TO: RN [id number]    Blood Culture - Draw one from central line and one from peripheral site [[id number]]  (Abnormal)  (Susceptibility) Collected: 03/25/24 1540    Order Status: Completed Specimen: Blood from Line Updated: 03/30/24 1023     Significant Indicator POS     Source BLD     Site Peripheral     Culture Result Growth detected by Bactec instrument.  03/28/2024  05:17      Streptococcus gordonii    Narrative:      CALL  Cramer  THAD tel. [phone],  CALLED  THAD tel. [phone] 03/28/2024, 05:22, RB PERF. RESULTS CALLED TO: RN  [id number]  Left AC    Susceptibility       Streptococcus gordonii (1)       Antibiotic Interpretation Microscan   Method Status    Penicillin Sensitive 0.032 mcg/mL E-TEST Final    Cefotaxime Sensitive 0.023 mcg/mL E-TEST Final                       BLOOD CULTURE [[id number]] Collected: 03/27/24 0409    Order Status: Completed Specimen: Blood from Peripheral Updated: 03/28/24 0842     Significant Indicator NEG     Source BLD     Site PERIPHERAL     Culture Result No Growth  Note: Blood cultures are incubated for 5 days and  are monitored continuously.Positive blood cultures  are called to the RN and reported as soon as  they are identified.      Narrative:      Left Hand    BLOOD CULTURE [[id number]] Collected: 03/27/24 0428    Order Status: Completed Specimen: Blood from Peripheral Updated: 03/28/24 0842     Significant Indicator NEG     Source BLD     Site PERIPHERAL     Culture Result No Growth  Note: Blood cultures are incubated for 5 days and  are monitored continuously.Positive blood cultures  are called to the RN and reported as soon as  they are identified.      Narrative:      Left    Urine Culture (New) [[id number]]  (Abnormal)  (Susceptibility) Collected: 03/25/24 1550    Order Status: Completed Specimen: Urine Updated: 03/27/24 0727     Significant Indicator POS     Source UR     Site -     Culture Result -      Escherichia coli  >100,000 cfu/mL      Narrative:      Indication for culture:->Emergency Room Patient    Susceptibility       Escherichia coli (1)       Antibiotic Interpretation Microscan   Method Status    Ampicillin Resistant >16 mcg/mL MILY Final    Amoxicillin/CA Sensitive <=8/4 mcg/mL MILY Final    Ceftriaxone Sensitive <=1 mcg/mL MILY Final    Cefazolin Sensitive 4 mcg/mL MILY Final     Breakpoints when Cefazolin is used for  therapy of infections  other than uncomplicated UTIs due to Enterobacterales are as  follows:  MILY and Interpretation:  <=2 S  4 I  >=8 R         Ciprofloxacin Sensitive <=0.25 mcg/mL MILY Final    Cefepime Sensitive <=2 mcg/mL MILY Final    Cefuroxime Sensitive 8 mcg/mL MILY Final    Ampicillin/sulbactam Intermediate 16/8 mcg/mL MILY Final    Tobramycin Sensitive <=2 mcg/mL MILY Final    Nitrofurantoin Sensitive <=32 mcg/mL MILY Final    Gentamicin Sensitive <=2 mcg/mL MILY Final    Levofloxacin Sensitive <=0.5 mcg/mL MILY Final    Minocycline Sensitive <=4 mcg/mL MILY Final    Pip/Tazobactam Sensitive <=8 mcg/mL MILY Final    Trimeth/Sulfa Sensitive <=0.5/9.5 mcg/mL MILY Final    Tigecycline Sensitive <=2 mcg/mL MILY Final                       Urinalysis [[id number]]  (Abnormal) Collected: 03/25/24 5164    Order Status: Completed Specimen: Urine Updated: 03/25/24 8803     Color Yellow     Character Clear     Specific Gravity 1.018     Ph 5.5     Glucose Negative mg/dL      Ketones Negative mg/dL      Protein 100 mg/dL      Bilirubin Negative     Urobilinogen, Urine 0.2     Nitrite Positive     Leukocyte Esterase Trace     Occult Blood Small     Micro Urine Req Microscopic            Assessment/Hospital course:  This is an 88-year-old female patient who presented after a fall for somnolence, altered mental status.  She has known protein S deficiency on Xarelto, hypertension, hyperlipidemia.  MRI of the brain with multiple acute infarcts, blood cultures x 2 positive for Strep gordonii, status post TAVR.  TTE with echodensity on the posterior leaflet of the native mitral valve, EKG with second-degree AV block.    Patient had a recent dental procedure about a month ago and has a temporary crown in place.    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  We will follow along.  Of note, according to patient's son, she does not have a well adhered crown in place, just a temporary one so please make sure this is communicated with the proceduralists  -Recommend Panorex or CT maxillofacial to look for dental abscesses  -Recommend MRI of the whole spine when able and if in line with goals of care    Disposition: Anticipate at least 6 weeks of IV antibiotics at a facility  Need for PICC line: Eventually yes, currently not cleared    Discussed with Dr. Parker.  This illness poses threat to life.  ID will follow

## 2024-03-31 NOTE — PROGRESS NOTES
Monitor Summary: SR 75-85, SC .19, QRS .09, QT .40 with frequent PVC,rare couplet&triplet per strip from monitor room

## 2024-03-31 NOTE — PROGRESS NOTES
Hospital Medicine Daily Progress Note    Date of Service  3/31/2024    Chief Complaint  Marry Mathur is a 88 y.o. female admitted 3/25/2024 with altered mental status    Hospital Course  88 y.o. female who presented 3/25/2024 with altered mental status.  History limited as patient is confused at bedside.  Patient reports that she went to the restroom and caught her foot on an object, and had been lying down on the ground for a few minutes.  She was too weak to get up.  Per EMS, neighbors checked on patient and found her on the ground, unclear of how long she was actually down on the ground.  She was then brought to ER for evaluation.     In ER, patient found to have normal vital signs.  Pertinent labs include prerenal azotemia, .  UA showing many bacteria, elevated white blood cell count/nitrite.  CT head showing suspected age-indeterminate infarcts.  Chest x-ray showed no acute findings.    Interval Problem Update  No acute events overnight.  WBC improved 9k.  Blood cultures remain NGTD.  On penicillin for streptococcus bacteremia per ID.  NPO at midnight, plan for VINAY tomorrow.  Will need to notify cardiologist that patient has temporary crown in place as to avoid VINAY complication.  Appreciate neurology, ID, cardiology recommendations.      I have discussed this patient's plan of care and discharge plan at IDT rounds today with Case Management, Nursing, Nursing leadership, and other members of the IDT team.    Consultants/Specialty  General Neurology   Vascular Neurology    Code Status  Full Code    Disposition  The patient is not medically cleared for discharge to home or a post-acute facility.      I have placed the appropriate orders for post-discharge needs.    Review of Systems  Review of Systems   Constitutional:  Positive for malaise/fatigue. Negative for chills and fever.   Respiratory:  Negative for shortness of breath.    Cardiovascular:  Negative for chest pain.   Gastrointestinal:   Negative for abdominal pain, nausea and vomiting.   Musculoskeletal:  Positive for falls. Negative for myalgias.   Skin:  Negative for rash.   Neurological:  Positive for sensory change and weakness. Negative for headaches.   Psychiatric/Behavioral:  Negative for depression.    All other systems reviewed and are negative.       Physical Exam  Temp:  [36.1 °C (97 °F)-37.2 °C (99 °F)] 37 °C (98.6 °F)  Pulse:  [69-96] (P) 96  Resp:  [17-18] 17  BP: (121-139)/(54-72) (P) 132/69  SpO2:  [92 %-99 %] 97 %    Physical Exam  Vitals and nursing note reviewed.   Constitutional:       General: She is not in acute distress.     Appearance: She is ill-appearing (chronically).      Comments: Son at bedside    HENT:      Head: Normocephalic and atraumatic.      Mouth/Throat:      Pharynx: Oropharynx is clear.   Eyes:      Conjunctiva/sclera: Conjunctivae normal.   Cardiovascular:      Rate and Rhythm: Normal rate and regular rhythm.      Pulses: Normal pulses.   Pulmonary:      Effort: Pulmonary effort is normal. No respiratory distress.      Breath sounds: Normal breath sounds. No wheezing.   Abdominal:      General: Abdomen is flat. There is no distension.      Palpations: Abdomen is soft.      Tenderness: There is no abdominal tenderness.   Musculoskeletal:         General: Normal range of motion.      Cervical back: Neck supple.      Right lower leg: No edema.      Left lower leg: No edema.   Skin:     General: Skin is warm and dry.   Neurological:      General: No focal deficit present.      Mental Status: She is alert.      Cranial Nerves: No cranial nerve deficit.      Motor: Weakness present.   Psychiatric:         Mood and Affect: Mood is anxious.         Fluids    Intake/Output Summary (Last 24 hours) at 3/31/2024 1349  Last data filed at 3/31/2024 0326  Gross per 24 hour   Intake 220 ml   Output 400 ml   Net -180 ml           Laboratory  Recent Labs     03/29/24  0035 03/30/24  0129 03/31/24  0108   WBC 18.6* 11.5* 9.2    RBC 3.96* 3.78* 3.80*   HEMOGLOBIN 11.4* 11.3* 11.2*   HEMATOCRIT 34.6* 33.7* 33.7*   MCV 87.4 89.2 88.7   MCH 28.8 29.9 29.5   MCHC 32.9 33.5 33.2   RDW 46.5 47.6 46.4   PLATELETCT 173 206 197   MPV 10.5 10.5 10.1     Recent Labs     03/29/24  0035 03/30/24  0129 03/31/24  0108   SODIUM 133* 135 136   POTASSIUM 3.8 3.5* 3.7   CHLORIDE 103 106 108   CO2 17* 17* 17*   GLUCOSE 111* 114* 117*   BUN 13 13 11   CREATININE 0.51 0.39* 0.43*   CALCIUM 7.1* 7.0* 7.2*             Recent Labs     03/29/24  0035   TRIGLYCERIDE 61   HDL 31*   LDL 48       Imaging  CT-HEAD W/O   Final Result      1. Stable patchy areas of decreased attenuation at the gray-white matter junctions in both cerebral hemispheres and cerebellar hemispheres when compared with the prior MRI of the brain performed on 3/28/2024.   2. No hemorrhagic transformation.   3. No other acute findings.         EC-ECHOCARDIOGRAM COMPLETE W/O CONT   Final Result      MR-BRAIN-WITH & W/O   Final Result         Multiple foci of acute infarction scattered throughout the bilateral cerebral hemispheres, cerebellum and brainstem as described above, suggesting a proximal cardiovascular embolic source.      The largest cluster of acute infarctions is noted in the right frontoparietal region involving the right MCA territory.      Age-related volume loss.      CT-CTA NECK WITH & W/O-POST PROCESSING   Final Result      No focal high-grade stenosis, dissection or occlusion of the cervical carotid or vertebral arteries.      CT-CTA HEAD WITH & W/O-POST PROCESS   Final Result      1.  Short segment decreased enhancement in the RIGHT superior sylvian division of middle superior artery, M3 segment, focal stenosis versus nonocclusive clot.   2.  No intracranial aneurysm or abrupt large vessel cut off.      CT-HEAD W/O   Final Result      1.  Hypoattenuating foci in the bilateral cerebellum which are indeterminant. They could be subacute infarcts versus other. Recommend brain MRI  workup.   2.  Redemonstration of a small focus of low-attenuation in the left parietal deep and subcortical white matter. Possibly gliosis versus subacute infarct.   3.  Findings are stable since the noncontrast head CT performed one day prior.         CT-HEAD W/O   Final Result      1.  Small, rounded hypodense areas of the bilateral cerebellar hemispheres. These may represent age-indeterminate infarcts. Less likely this could represent edema related to metastatic disease.   2.  There is a small area of loss of gray-white differentiation in the left parietal lobe, possibly an age-indeterminate infarct.   3.  These findings can be further evaluated with contrast-enhanced MRI as indicated.   4.  Cerebral atrophy.   5.  White matter lucencies most consistent with      DX-CHEST-PORTABLE (1 VIEW)   Final Result      No acute findings.           Assessment/Plan  * Acute metabolic encephalopathy- (present on admission)  Assessment & Plan  Patient presents with confusion.  UA pertinent for UTI, likely culprit for encephalopathy.  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, cardiology consulted, plan for VINAY Monday  improving    Stroke (cerebrum) (HCC)  Assessment & Plan  MRI brain confirms multiple foci of acute infarction bilaterally concerning for cardioembolic strokes  Possible septic emboli from endocarditis  Patient with hx of protein S deficiency and DVT, on anticoagulation at home, currently AC is held  Neurology following  ID and cardiology consulted    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, based on echo showing 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 in  setting of positive blood cultures  MRI brain with multiple foci of acute infarcts concerning for cardioembolic stroke  On ceftriaxone  Following blood cultures  ID, neurology, cardiology following  VINAY on Monday    Acute cystitis without hematuria  Assessment & Plan  Continue ancef  Urine culture with E coli    Positive blood culture  Assessment & Plan  Blood cultures 3/25 with strep species  Repeat blood cultures 3/27 taken  Urine culture positive for E coli    S/P TAVR (transcatheter aortic valve replacement)- (present on admission)  Assessment & Plan  Performed in September 2023    Protein S deficiency (HCC)- (present on admission)  Assessment & Plan  Holding home xarelto    History of recurrent deep vein thrombosis (DVT)- (present on admission)  Assessment & Plan  On Xarelto at home  Hold AC for now given strokes    ACP (advance care planning)  Assessment & Plan  16 minutes spent discussing goals of care with patient's son (Nando [phone]) via telephone.  He was explained about patient's current clinical condition and treatment plan.  He states that patient was confused when he was on the phone with her this morning, and that she did not recognize who he was.  He spoke with her in the evening yesterday and patient appeared to be at baseline.  He was asked about CODE STATUS, as he is power of , he states that he would like patient to be full code and to have everything done, including chest compressions and intubation.  He has a flight here tomorrow afternoon    Other hyperlipidemia- (present on admission)  Assessment & Plan  Lipitor    Hypertension  Assessment & Plan  Restart         VTE prophylaxis: SCDs

## 2024-04-01 ENCOUNTER — APPOINTMENT (OUTPATIENT)
Dept: CARDIOLOGY | Facility: MEDICAL CENTER | Age: 89
DRG: 288 | End: 2024-04-01
Payer: MEDICARE

## 2024-04-01 LAB
ALBUMIN SERPL BCP-MCNC: 2.4 G/DL (ref 3.2–4.9)
ALBUMIN/GLOB SERPL: 0.8 G/DL
ALP SERPL-CCNC: 65 U/L (ref 30–99)
ALT SERPL-CCNC: 63 U/L (ref 2–50)
ANION GAP SERPL CALC-SCNC: 12 MMOL/L (ref 7–16)
AST SERPL-CCNC: 53 U/L (ref 12–45)
BACTERIA BLD CULT: NORMAL
BACTERIA BLD CULT: NORMAL
BASOPHILS # BLD AUTO: 0.7 % (ref 0–1.8)
BASOPHILS # BLD: 0.05 K/UL (ref 0–0.12)
BILIRUB SERPL-MCNC: 0.4 MG/DL (ref 0.1–1.5)
BUN SERPL-MCNC: 9 MG/DL (ref 8–22)
CALCIUM ALBUM COR SERPL-MCNC: 8.1 MG/DL (ref 8.5–10.5)
CALCIUM SERPL-MCNC: 6.8 MG/DL (ref 8.5–10.5)
CHLORIDE SERPL-SCNC: 104 MMOL/L (ref 96–112)
CO2 SERPL-SCNC: 16 MMOL/L (ref 20–33)
CREAT SERPL-MCNC: 0.42 MG/DL (ref 0.5–1.4)
EOSINOPHIL # BLD AUTO: 0.09 K/UL (ref 0–0.51)
EOSINOPHIL NFR BLD: 1.3 % (ref 0–6.9)
ERYTHROCYTE [DISTWIDTH] IN BLOOD BY AUTOMATED COUNT: 45.8 FL (ref 35.9–50)
GFR SERPLBLD CREATININE-BSD FMLA CKD-EPI: 94 ML/MIN/1.73 M 2
GLOBULIN SER CALC-MCNC: 3.1 G/DL (ref 1.9–3.5)
GLUCOSE SERPL-MCNC: 145 MG/DL (ref 65–99)
HCT VFR BLD AUTO: 33.2 % (ref 37–47)
HGB BLD-MCNC: 11.1 G/DL (ref 12–16)
IMM GRANULOCYTES # BLD AUTO: 0.03 K/UL (ref 0–0.11)
IMM GRANULOCYTES NFR BLD AUTO: 0.4 % (ref 0–0.9)
LYMPHOCYTES # BLD AUTO: 0.66 K/UL (ref 1–4.8)
LYMPHOCYTES NFR BLD: 9.3 % (ref 22–41)
MCH RBC QN AUTO: 29.4 PG (ref 27–33)
MCHC RBC AUTO-ENTMCNC: 33.4 G/DL (ref 32.2–35.5)
MCV RBC AUTO: 88.1 FL (ref 81.4–97.8)
MONOCYTES # BLD AUTO: 0.87 K/UL (ref 0–0.85)
MONOCYTES NFR BLD AUTO: 12.3 % (ref 0–13.4)
NEUTROPHILS # BLD AUTO: 5.38 K/UL (ref 1.82–7.42)
NEUTROPHILS NFR BLD: 76 % (ref 44–72)
NRBC # BLD AUTO: 0 K/UL
NRBC BLD-RTO: 0 /100 WBC (ref 0–0.2)
PLATELET # BLD AUTO: 194 K/UL (ref 164–446)
PMV BLD AUTO: 10.3 FL (ref 9–12.9)
POTASSIUM SERPL-SCNC: 3.8 MMOL/L (ref 3.6–5.5)
PROT SERPL-MCNC: 5.5 G/DL (ref 6–8.2)
RBC # BLD AUTO: 3.77 M/UL (ref 4.2–5.4)
SIGNIFICANT IND 70042: NORMAL
SIGNIFICANT IND 70042: NORMAL
SITE SITE: NORMAL
SITE SITE: NORMAL
SODIUM SERPL-SCNC: 132 MMOL/L (ref 135–145)
SOURCE SOURCE: NORMAL
SOURCE SOURCE: NORMAL
WBC # BLD AUTO: 7.1 K/UL (ref 4.8–10.8)

## 2024-04-01 PROCEDURE — 700105 HCHG RX REV CODE 258: Performed by: STUDENT IN AN ORGANIZED HEALTH CARE EDUCATION/TRAINING PROGRAM

## 2024-04-01 PROCEDURE — 36415 COLL VENOUS BLD VENIPUNCTURE: CPT

## 2024-04-01 PROCEDURE — 99233 SBSQ HOSP IP/OBS HIGH 50: CPT | Performed by: INTERNAL MEDICINE

## 2024-04-01 PROCEDURE — A9270 NON-COVERED ITEM OR SERVICE: HCPCS | Performed by: STUDENT IN AN ORGANIZED HEALTH CARE EDUCATION/TRAINING PROGRAM

## 2024-04-01 PROCEDURE — 85025 COMPLETE CBC W/AUTO DIFF WBC: CPT

## 2024-04-01 PROCEDURE — 80053 COMPREHEN METABOLIC PANEL: CPT

## 2024-04-01 PROCEDURE — 700111 HCHG RX REV CODE 636 W/ 250 OVERRIDE (IP)

## 2024-04-01 PROCEDURE — 99232 SBSQ HOSP IP/OBS MODERATE 35: CPT | Performed by: INTERNAL MEDICINE

## 2024-04-01 PROCEDURE — 99232 SBSQ HOSP IP/OBS MODERATE 35: CPT | Performed by: STUDENT IN AN ORGANIZED HEALTH CARE EDUCATION/TRAINING PROGRAM

## 2024-04-01 PROCEDURE — 700102 HCHG RX REV CODE 250 W/ 637 OVERRIDE(OP): Performed by: STUDENT IN AN ORGANIZED HEALTH CARE EDUCATION/TRAINING PROGRAM

## 2024-04-01 PROCEDURE — 700105 HCHG RX REV CODE 258

## 2024-04-01 PROCEDURE — 770020 HCHG ROOM/CARE - TELE (206)

## 2024-04-01 RX ADMIN — AMLODIPINE BESYLATE 2.5 MG: 5 TABLET ORAL at 17:01

## 2024-04-01 RX ADMIN — ATORVASTATIN CALCIUM 20 MG: 20 TABLET, FILM COATED ORAL at 17:01

## 2024-04-01 RX ADMIN — SODIUM CHLORIDE 3 MILLION UNITS: 9 INJECTION, SOLUTION INTRAVENOUS at 12:57

## 2024-04-01 RX ADMIN — SODIUM CHLORIDE 3 MILLION UNITS: 9 INJECTION, SOLUTION INTRAVENOUS at 02:29

## 2024-04-01 RX ADMIN — SODIUM CHLORIDE 3 MILLION UNITS: 9 INJECTION, SOLUTION INTRAVENOUS at 05:32

## 2024-04-01 RX ADMIN — DEXTROSE AND SODIUM CHLORIDE: 5; 450 INJECTION, SOLUTION INTRAVENOUS at 17:00

## 2024-04-01 RX ADMIN — SODIUM CHLORIDE 3 MILLION UNITS: 9 INJECTION, SOLUTION INTRAVENOUS at 08:57

## 2024-04-01 RX ADMIN — DEXTROSE AND SODIUM CHLORIDE: 5; 450 INJECTION, SOLUTION INTRAVENOUS at 03:20

## 2024-04-01 RX ADMIN — SODIUM CHLORIDE 3 MILLION UNITS: 9 INJECTION, SOLUTION INTRAVENOUS at 22:23

## 2024-04-01 RX ADMIN — SODIUM CHLORIDE 3 MILLION UNITS: 9 INJECTION, SOLUTION INTRAVENOUS at 17:00

## 2024-04-01 ASSESSMENT — ENCOUNTER SYMPTOMS
FALLS: 1
VOMITING: 0
MYALGIAS: 0
ABDOMINAL PAIN: 0
FEVER: 0
WEAKNESS: 1
NAUSEA: 0
CHILLS: 0
SENSORY CHANGE: 1
DEPRESSION: 0
HEADACHES: 0
SHORTNESS OF BREATH: 0

## 2024-04-01 ASSESSMENT — FIBROSIS 4 INDEX: FIB4 SCORE: 2.99

## 2024-04-01 NOTE — PROGRESS NOTES
Monitor summary: SR 73--87, CA 0.17, QRS 0.09, QT 0.39 with PVCs, frequent trigems and couplets, per strip from monitor room.

## 2024-04-01 NOTE — PROGRESS NOTES
Monitor Summary: SR 69-82, CT .19, QRS .09, QT .40 with frequent PVC,rare bigem per strip from monitor room

## 2024-04-01 NOTE — PROGRESS NOTES
Cardiology Follow-up Consult Note    Date of Service:    2024      Consulting Physician: Dash Parker M.D.    Name:   Marry Mathur   YOB: 1935  Age:   88 y.o.  female   MRN:   [id number]      Subjective:  Son is at bedside.  Patient does not give a lot of history.  She is slightly lethargic.  Per son, she was doing well up until this occurred.  She is pretty functional and active.  She did have a crown placed about 2 weeks ago.  She was more alert yesterday, less so today.    Blood culture 3/25/24 positive for Strep Gordinni  TTE 3/29/24: density on mitral valve, know TAVR 2023    All other review of systems reviewed and negative.    Past medical, surgical, social, and family history reviewed and unchanged from admission except as noted in assessment and plan.    Medications Prior to Admission   Medication Sig Dispense Refill Last Dose    [] loperamide (IMODIUM) 2 MG Cap Take 1 Capsule by mouth 4 times a day as needed for Diarrhea for up to 7 days. 30 Capsule 0 3/24/2024 at am    MAGNESIUM PO Take 1 Tablet by mouth every day.   3/25/2024 at am    rivaroxaban (XARELTO) 20 MG Tab tablet Take 1 Tablet by mouth with dinner. 90 Tablet 3 3/24/2024 at pm    losartan (COZAAR) 50 MG Tab TAKE 1 TABLET BY MOUTH EVERY DAY 90 Tablet 0 3/25/2024 at am    amLODIPine (NORVASC) 2.5 MG Tab Take 2.5 mg by mouth every evening.   3/24/2024 at pm    omeprazole (PRILOSEC) 20 MG delayed-release capsule Take 1 Capsule by mouth every morning. 90 Capsule 3 3/25/2024 at am    diphenhydrAMINE-APAP, sleep,  MG Tab Take 2 Tablets by mouth at bedtime.   3/24/2024 at hs    lidocaine (LIDODERM) 5 % Patch APPLY 1 PATCH TO SKIN DAILY FOR 12 HOURS ON THEN 12 HOURS OFF   3/24/2024 at am    SODIUM FLUORIDE 5000 PLUS 1.1 % Cream BRUSH WITH PEA SIZEED AMOUNT ONCE A DAY PREFERABLY BEFORE BEDTIME. SPIT OUT ALL EXCESS. DO NOT RINSE   3/24/2024 at pm    atorvastatin (LIPITOR) 20 MG Tab Take 1 Tablet by mouth every  evening. 90 Tablet 3 3/24/2024 at pm    gabapentin (NEURONTIN) 300 MG Cap Take 300 mg by mouth 3 times a day.   3/25/2024 at am    Multiple Vitamins-Minerals (PRESERVISION AREDS 2 PO) Take 1 Capsule by mouth 2 times a day.   3/25/2024 at am    Calcium Carbonate 600 MG Tab Take 600 mg by mouth every morning.   3/25/2024 at am    acetaminophen (TYLENOL) 500 MG Tab Take 750 mg by mouth 2 times a day as needed for Mild Pain. 1.5 in am, 1 tab at noon, 2 in pm  Indications: Pain   3/25/2024 at am    Melatonin 10 MG Tab Take 10 mg by mouth every evening.   3/24/2024 at hs    vitamin D (CHOLECALCIFEROL) 1000 UNIT Tab Take 1,000 Units by mouth every morning.   3/25/2024 at am     Current Facility-Administered Medications   Medication Dose Frequency Provider Last Rate Last Admin    penicillin G potassium 3 Million Units in  mL IVPB  3 Million Units Q4HRS RUY PetersonN.P.   Stopped at 04/01/24 0602    D5 1/2 NS infusion   Continuous Dash Parker M.D. 83 mL/hr at 04/01/24 0320 New Bag at 04/01/24 0320    amLODIPine (Norvasc) tablet 2.5 mg  2.5 mg Q EVENING Selwyn Waters M.D.   2.5 mg at 03/30/24 1637    atorvastatin (Lipitor) tablet 20 mg  20 mg Q EVENING Selwyn Waters M.D.   20 mg at 03/30/24 1638    losartan (Cozaar) tablet 50 mg  50 mg DAILY Selwyn Waters M.D.   50 mg at 03/31/24 0506    [Held by provider] rivaroxaban (Xarelto) tablet 20 mg  20 mg PM MEAL Selwyn Waters M.D.   20 mg at 03/27/24 1748    acetaminophen (Tylenol) tablet 650 mg  650 mg Q6HRS PRN Selwyn Waters M.D.       Last reviewed on 3/25/2024  8:05 PM by Kayli Velasquez R.N.     Allergies   Allergen Reactions    Wound Dressing Adhesive      Pt says band-aids cause skin reaction/rash if on for more than 2 days  Paper tape OK           Intake/Output Summary (Last 24 hours) at 4/1/2024 0803  Last data filed at 3/31/2024 1400  Gross per 24 hour   Intake --   Output 900 ml   Net -900 ml        Physical Exam  Body mass index is  "33.63 kg/m².  /79   Pulse 80   Temp 36.6 °C (97.9 °F) (Temporal)   Resp 18   Ht 1.575 m (5' 2\")   Wt 83.4 kg (183 lb 13.8 oz)   SpO2 96%   Vitals:    03/31/24 1700 03/31/24 1904 03/31/24 2331 04/01/24 0311   BP: 139/56 134/70 128/68 139/79   Pulse:  82 78 80   Resp: 17 19 18 18   Temp: 36.5 °C (97.7 °F) 37 °C (98.6 °F) 36.7 °C (98.1 °F) 36.6 °C (97.9 °F)   TempSrc: Temporal Temporal Temporal Temporal   SpO2: 97% 94% 95% 96%   Weight:    83.4 kg (183 lb 13.8 oz)   Height:         Oxygen Therapy:  Pulse Oximetry: 96 %, O2 (LPM): 2, O2 Delivery Device: Nasal Cannula    Physical Exam  Constitutional:       Appearance: Normal appearance.   Neck:      Vascular: No JVD.   Cardiovascular:      Rate and Rhythm: Normal rate and regular rhythm.      Pulses: Normal pulses and intact distal pulses.      Heart sounds: S1 normal and S2 normal. Murmur heard.      Systolic murmur is present with a grade of 2/6.      No friction rub. No gallop.   Pulmonary:      Effort: Pulmonary effort is normal. No respiratory distress.      Breath sounds: Normal breath sounds. No wheezing or rales.   Musculoskeletal:      Cervical back: Neck supple.   Neurological:      Mental Status: She is oriented to person, place, and time. She is lethargic.       Labs (personally reviewed and notable for):   Lab Results   Component Value Date/Time    SODIUM 132 (L) 04/01/2024 01:31 AM    POTASSIUM 3.8 04/01/2024 01:31 AM    CHLORIDE 104 04/01/2024 01:31 AM    CO2 16 (L) 04/01/2024 01:31 AM    GLUCOSE 145 (H) 04/01/2024 01:31 AM    BUN 9 04/01/2024 01:31 AM    CREATININE 0.42 (L) 04/01/2024 01:31 AM      Lab Results   Component Value Date/Time    WBC 7.1 04/01/2024 01:31 AM    RBC 3.77 (L) 04/01/2024 01:31 AM    HEMOGLOBIN 11.1 (L) 04/01/2024 01:31 AM    HEMATOCRIT 33.2 (L) 04/01/2024 01:31 AM    MCV 88.1 04/01/2024 01:31 AM    MCH 29.4 04/01/2024 01:31 AM    MCHC 33.4 04/01/2024 01:31 AM    MPV 10.3 04/01/2024 01:31 AM    NEUTSPOLYS 76.00 (H) " 04/01/2024 01:31 AM    LYMPHOCYTES 9.30 (L) 04/01/2024 01:31 AM    MONOCYTES 12.30 04/01/2024 01:31 AM    EOSINOPHILS 1.30 04/01/2024 01:31 AM    BASOPHILS 0.70 04/01/2024 01:31 AM      Lab Results   Component Value Date/Time    CHOLSTRLTOT 91 (L) 03/29/2024 12:35 AM    LDL 48 03/29/2024 12:35 AM    HDL 31 (A) 03/29/2024 12:35 AM    TRIGLYCERIDE 61 03/29/2024 12:35 AM       Lab Results   Component Value Date/Time    TROPONINT 28 (H) 09/27/2022 1906     Lab Results   Component Value Date/Time    NTPROBNP 2789 (H) 03/20/2024 1100       Telemetry Reviewed with Monitor Tech: sinus with frequent premature beats    Radiology test Review:  CT-HEAD W/O   Final Result      1. Stable patchy areas of decreased attenuation at the gray-white matter junctions in both cerebral hemispheres and cerebellar hemispheres when compared with the prior MRI of the brain performed on 3/28/2024.   2. No hemorrhagic transformation.   3. No other acute findings.         EC-ECHOCARDIOGRAM COMPLETE W/O CONT   Final Result      MR-BRAIN-WITH & W/O   Final Result         Multiple foci of acute infarction scattered throughout the bilateral cerebral hemispheres, cerebellum and brainstem as described above, suggesting a proximal cardiovascular embolic source.      The largest cluster of acute infarctions is noted in the right frontoparietal region involving the right MCA territory.      Age-related volume loss.      CT-CTA NECK WITH & W/O-POST PROCESSING   Final Result      No focal high-grade stenosis, dissection or occlusion of the cervical carotid or vertebral arteries.      CT-CTA HEAD WITH & W/O-POST PROCESS   Final Result      1.  Short segment decreased enhancement in the RIGHT superior sylvian division of middle superior artery, M3 segment, focal stenosis versus nonocclusive clot.   2.  No intracranial aneurysm or abrupt large vessel cut off.      CT-HEAD W/O   Final Result      1.  Hypoattenuating foci in the bilateral cerebellum which are  indeterminant. They could be subacute infarcts versus other. Recommend brain MRI workup.   2.  Redemonstration of a small focus of low-attenuation in the left parietal deep and subcortical white matter. Possibly gliosis versus subacute infarct.   3.  Findings are stable since the noncontrast head CT performed one day prior.         CT-HEAD W/O   Final Result      1.  Small, rounded hypodense areas of the bilateral cerebellar hemispheres. These may represent age-indeterminate infarcts. Less likely this could represent edema related to metastatic disease.   2.  There is a small area of loss of gray-white differentiation in the left parietal lobe, possibly an age-indeterminate infarct.   3.  These findings can be further evaluated with contrast-enhanced MRI as indicated.   4.  Cerebral atrophy.   5.  White matter lucencies most consistent with      DX-CHEST-PORTABLE (1 VIEW)   Final Result      No acute findings.      EC-VINAY W/O CONT    (Results Pending)       Impression and Medical Decision Makin.  Bacteremia with strep regard to the knee.  Transthoracic echo already suggestive of possible mitral valve vegetation.  Brain MRI with multiple foci of acute infarction, concern for possible septic emboli to the brain.  Transesophageal echo therefore has been requested.  Patient is not really in a state to undergo a transesophageal echo today.  Acutely is not can .  Will defer transesophageal echo for later date.  Will plan for tomorrow, however if she remains lethargic, would postpone it.  Patient also is getting an NG tube placed and therefore that would have to be removed prior to transesophageal echo.  - Defer transesophageal echo until patient is clinically more stable  - Please call cardiology back once patient is more clinically stable to undergo transesophageal echo    2.  Status post transcatheter aortic valve replacement in .  We will also look at this on transesophageal echo.   Transthoracic echo suggest still normal function.    A total of 20 minutes was utilized in direct face-to-face care, translated of services, document preparation, document review, laboratory review, imaging review and coordination in a multidisciplinary team.     Analia Caraballo MD  Cardiologist, [facility]    Please note that this dictation was created using voice recognition software. I have made every reasonable attempt to correct obvious errors, but it is possible there are errors of grammar and possibly content that I did not discover before finalizing the note.

## 2024-04-01 NOTE — PROGRESS NOTES
Lab called with critical result of Ca 6.8 at 0217.   Renetta Hart DNP notified of critical lab result at 0220. Critical lab result read back by Renetta Hart DNP.

## 2024-04-01 NOTE — CARE PLAN
The patient is Watcher - Medium risk of patient condition declining or worsening    Shift Goals  Clinical Goals: neuro checks, therapy, abx  Patient Goals: comfort, rest  Family Goals: TIM    Progress made toward(s) clinical / shift goals:        Problem: Knowledge Deficit - Standard  Goal: Patient and family/care givers will demonstrate understanding of plan of care, disease process/condition, diagnostic tests and medications  Outcome: Progressing     Problem: Fall Risk  Goal: Patient will remain free from falls  Outcome: Progressing     Problem: Neuro Status  Goal: Neuro status will remain stable or improve  Outcome: Progressing       Patient is not progressing towards the following goals:    Pt remains stable but was lethargic after working with therapy this morning and for the rest of the day. Pt was unable to eat so IVF was switched to D5 1/2NS

## 2024-04-01 NOTE — PROGRESS NOTES
Assumed care of patient and received report from Reyna LARA. Assessment completed.Pt A&Ox 2. Pt extremely lethargic. Respirations are even and unlabored on 2LNC. Pt denies pain at this time. Tele monitor in place. Call light and belongings are within reach. POC updated. Pt educated on room and call light, pt verbalized understanding. Needs met.

## 2024-04-01 NOTE — DISCHARGE PLANNING
Letter signed by two physicians given to son regarding patient's capacity to make decisions. Copy placed in media.

## 2024-04-01 NOTE — PROGRESS NOTES
[facility] Medicine Daily Progress Note    Date of Service  4/1/2024    Chief Complaint  Marry Mathur is a 88 y.o. female admitted 3/25/2024 with altered mental status    Hospital Course  88 y.o. female who presented 3/25/2024 with altered mental status.  Per EMS, neighbors checked on patient and found her on the ground, unclear of how long she was actually down on the ground.  She was then brought to ER for evaluation.  Patient found to have UTI, rhabdomyolysis, DASHA. She was admitted for treatment of metabolic encephalopathy. Patient had acute lethargy requiring rapid response 3/26, code stroke activated. She had equivocal EEG and CT head, but MRI brain showed numerous embolic infarcts across bilateral hemispheres concerning for cardioembolic stroke. Blood cultures positive for streptococcus gordonii, urine culture with E coli. Cardiology and ID consulted who recommend VNIAY. Repeat blood cultures so far negative. Plan for VINAY when patient is more alert.       Interval Problem Update  No acute events overnight.  Patient in and out of lethargy/sleeping. VINAY postponed until patient is more alert per cardiology.  VINAY  should be aware that patient has temporary crown in place as to avoid dislodging it during procedure. Cardiology APRN notified.  Continue penicillin per ID. Repeat blood cultures NGTD.  Consider NG tube placement and tube feeding if patient is not alert enough for PO intake.  NPO at midnight for tentative VINAY procedure tomorrow.        I have discussed this patient's plan of care and discharge plan at IDT rounds today with Case Management, Nursing, Nursing leadership, and other members of the IDT team.    Consultants/Specialty  General Neurology   Vascular Neurology    Code Status  Full Code    Disposition  The patient is not medically cleared for discharge to home or a post-acute facility.      I have placed the appropriate orders for post-discharge needs.    Review of Systems  Review of  Systems   Constitutional:  Positive for malaise/fatigue. Negative for chills and fever.   Respiratory:  Negative for shortness of breath.    Cardiovascular:  Negative for chest pain.   Gastrointestinal:  Negative for abdominal pain, nausea and vomiting.   Musculoskeletal:  Positive for falls. Negative for myalgias.   Skin:  Negative for rash.   Neurological:  Positive for sensory change and weakness. Negative for headaches.   Psychiatric/Behavioral:  Negative for depression.    All other systems reviewed and are negative.       Physical Exam  Temp:  [36.5 °C (97.7 °F)-37 °C (98.6 °F)] 36.8 °C (98.2 °F)  Pulse:  [74-82] 81  Resp:  [17-19] 18  BP: (128-144)/(56-79) 144/78  SpO2:  [94 %-99 %] 99 %    Physical Exam  Vitals and nursing note reviewed.   Constitutional:       General: She is not in acute distress.     Appearance: She is ill-appearing (chronically).      Comments: Son at bedside    HENT:      Head: Normocephalic and atraumatic.      Mouth/Throat:      Pharynx: Oropharynx is clear.   Eyes:      Conjunctiva/sclera: Conjunctivae normal.   Cardiovascular:      Rate and Rhythm: Normal rate and regular rhythm.      Pulses: Normal pulses.   Pulmonary:      Effort: Pulmonary effort is normal. No respiratory distress.      Breath sounds: Normal breath sounds. No wheezing.   Abdominal:      General: Abdomen is flat. There is no distension.      Palpations: Abdomen is soft.      Tenderness: There is no abdominal tenderness.   Musculoskeletal:         General: Normal range of motion.      Cervical back: Neck supple.      Right lower leg: No edema.      Left lower leg: No edema.   Skin:     General: Skin is warm and dry.   Neurological:      General: No focal deficit present.      Mental Status: She is alert.      Cranial Nerves: No cranial nerve deficit.      Motor: Weakness present.   Psychiatric:         Mood and Affect: Mood is anxious.         Fluids    Intake/Output Summary (Last 24 hours) at 4/1/2024 1410  Last  data filed at 4/1/2024 1000  Gross per 24 hour   Intake 120 ml   Output --   Net 120 ml           Laboratory  Recent Labs     03/30/24 0129 03/31/24 0108 04/01/24  0131   WBC 11.5* 9.2 7.1   RBC 3.78* 3.80* 3.77*   HEMOGLOBIN 11.3* 11.2* 11.1*   HEMATOCRIT 33.7* 33.7* 33.2*   MCV 89.2 88.7 88.1   MCH 29.9 29.5 29.4   MCHC 33.5 33.2 33.4   RDW 47.6 46.4 45.8   PLATELETCT 206 197 194   MPV 10.5 10.1 10.3     Recent Labs     03/30/24 0129 03/31/24 0108 04/01/24  0131   SODIUM 135 136 132*   POTASSIUM 3.5* 3.7 3.8   CHLORIDE 106 108 104   CO2 17* 17* 16*   GLUCOSE 114* 117* 145*   BUN 13 11 9   CREATININE 0.39* 0.43* 0.42*   CALCIUM 7.0* 7.2* 6.8*                     Imaging  CT-HEAD W/O   Final Result      1. Stable patchy areas of decreased attenuation at the gray-white matter junctions in both cerebral hemispheres and cerebellar hemispheres when compared with the prior MRI of the brain performed on 3/28/2024.   2. No hemorrhagic transformation.   3. No other acute findings.         EC-ECHOCARDIOGRAM COMPLETE W/O CONT   Final Result      MR-BRAIN-WITH & W/O   Final Result         Multiple foci of acute infarction scattered throughout the bilateral cerebral hemispheres, cerebellum and brainstem as described above, suggesting a proximal cardiovascular embolic source.      The largest cluster of acute infarctions is noted in the right frontoparietal region involving the right MCA territory.      Age-related volume loss.      CT-CTA NECK WITH & W/O-POST PROCESSING   Final Result      No focal high-grade stenosis, dissection or occlusion of the cervical carotid or vertebral arteries.      CT-CTA HEAD WITH & W/O-POST PROCESS   Final Result      1.  Short segment decreased enhancement in the RIGHT superior sylvian division of middle superior artery, M3 segment, focal stenosis versus nonocclusive clot.   2.  No intracranial aneurysm or abrupt large vessel cut off.      CT-HEAD W/O   Final Result      1.  Hypoattenuating  foci in the bilateral cerebellum which are indeterminant. They could be subacute infarcts versus other. Recommend brain MRI workup.   2.  Redemonstration of a small focus of low-attenuation in the left parietal deep and subcortical white matter. Possibly gliosis versus subacute infarct.   3.  Findings are stable since the noncontrast head CT performed one day prior.         CT-HEAD W/O   Final Result      1.  Small, rounded hypodense areas of the bilateral cerebellar hemispheres. These may represent age-indeterminate infarcts. Less likely this could represent edema related to metastatic disease.   2.  There is a small area of loss of gray-white differentiation in the left parietal lobe, possibly an age-indeterminate infarct.   3.  These findings can be further evaluated with contrast-enhanced MRI as indicated.   4.  Cerebral atrophy.   5.  White matter lucencies most consistent with      DX-CHEST-PORTABLE (1 VIEW)   Final Result      No acute findings.      EC-VINAY W/O CONT    (Results Pending)        Assessment/Plan  * Acute metabolic encephalopathy- (present on admission)  Assessment & Plan  Patient presents with confusion.  UA pertinent for UTI, likely culprit for encephalopathy.  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, cardiology consulted, plan for VINAY possibly tomorrow Tuesday  improving    Stroke (cerebrum) (HCC)  Assessment & Plan  MRI brain confirms multiple foci of acute infarction bilaterally concerning for cardioembolic strokes  Possible septic emboli from endocarditis  Patient with hx of protein S deficiency and DVT, on anticoagulation  at home, currently AC is held  Neurology following  ID and cardiology consulted    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, based on echo showing 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 in setting of positive blood cultures  MRI brain with multiple foci of acute infarcts concerning for cardioembolic stroke  On ceftriaxone  Following blood cultures  ID, neurology, cardiology following  VINAY tomorrow if patient is alert per cardiology    Acute cystitis without hematuria  Assessment & Plan  Continue ancef  Urine culture with E coli    Positive blood culture  Assessment & Plan  Blood cultures 3/25 with strep species  Repeat blood cultures 3/27 taken  Urine culture positive for E coli    S/P TAVR (transcatheter aortic valve replacement)- (present on admission)  Assessment & Plan  Performed in September 2023    Protein S deficiency (HCC)- (present on admission)  Assessment & Plan  Holding home xarelto    History of recurrent deep vein thrombosis (DVT)- (present on admission)  Assessment & Plan  On Xarelto at home  Hold AC for now given strokes    ACP (advance care planning)  Assessment & Plan  16 minutes spent discussing goals of care with patient's son (Nando [phone]) via telephone.  He was explained about patient's current clinical condition and treatment plan.  He states that patient was confused when he was on the phone with her this morning, and that she did not recognize who he was.  He spoke with her in the evening yesterday and patient appeared to be at baseline.  He was asked about CODE STATUS, as he is power of , he states that he would like patient to be full code and to have everything done, including chest compressions and intubation.  He has a flight here tomorrow afternoon    Other hyperlipidemia- (present on admission)  Assessment & Plan  Lipitor    Hypertension  Assessment & Plan  Restart         VTE prophylaxis: SCDs

## 2024-04-01 NOTE — CARE PLAN
The patient is Stable - Low risk of patient condition declining or worsening    Shift Goals  Clinical Goals: neuro checks, ABX, q2 turn  Patient Goals: comfort, rest  Family Goals: not present    \  Problem: Fall Risk  Goal: Patient will remain free from falls  Outcome: Progressing     Problem: Pain - Standard  Goal: Alleviation of pain or a reduction in pain to the patient’s comfort goal  Outcome: Progressing     Problem: Skin Integrity  Goal: Skin integrity is maintained or improved  Outcome: Progressing

## 2024-04-01 NOTE — PROGRESS NOTES
Infectious Disease Progress Note    Author: Giancarlo Ervin M.D. Date & Time of service: 2024  8:30 AM    Chief Complaint:  Follow-up for bacteremia    Interval History:  3/30 Tmax 99.3, white count down to 11.5, tolerated switch to ceftriaxone, cultures as below, creatinine 0.39, mildly elevated transaminases,   3/31 patient is afebrile, white count down to 9.2, tolerated switch to penicillin, creatinine 0.43, mildly elevated transaminases stable, cultures as below   patient remains afebrile, count is 7.1, tolerated switch to penicillin, creatinine 0.42, mildly elevated transaminases improving now, repeat blood cultures no growth till date    Labs Reviewed and Medications Reviewed.    Review of Systems:  Review of Systems   Unable to perform ROS: Medical condition       Hemodynamics:  Temp (24hrs), Av.7 °C (98.1 °F), Min:36.5 °C (97.7 °F), Max:37 °C (98.6 °F)  Temperature: 36.6 °C (97.9 °F)  Pulse  Av.5  Min: 48  Max: 97   Blood Pressure : 139/79       Physical Exam:  Physical Exam  Vitals and nursing note reviewed.   Constitutional:       General: She is not in acute distress.     Appearance: She is ill-appearing.      Comments: Asleep, snoring, did not awaken with light stimuli   HENT:      Mouth/Throat:      Pharynx: No oropharyngeal exudate.   Cardiovascular:      Rate and Rhythm: Normal rate.   Pulmonary:      Effort: Pulmonary effort is normal. No respiratory distress.      Breath sounds: No stridor.   Abdominal:      General: There is no distension.   Musculoskeletal:         General: No swelling or tenderness.   Skin:     Findings: No erythema or rash.         Meds:    Current Facility-Administered Medications:     penicillin g    D5 1/2 NS    amLODIPine    atorvastatin    losartan    [Held by provider] rivaroxaban    acetaminophen    Labs:  Recent Labs     24  0129 24  0108 24  0131   WBC 11.5* 9.2 7.1   RBC 3.78* 3.80* 3.77*   HEMOGLOBIN 11.3* 11.2* 11.1*    HEMATOCRIT 33.7* 33.7* 33.2*   MCV 89.2 88.7 88.1   MCH 29.9 29.5 29.4   RDW 47.6 46.4 45.8   PLATELETCT 206 197 194   MPV 10.5 10.1 10.3   NEUTSPOLYS 82.50*  --  76.00*   LYMPHOCYTES 7.80*  --  9.30*   MONOCYTES 7.90  --  12.30   EOSINOPHILS 1.20  --  1.30   BASOPHILS 0.30  --  0.70     Recent Labs     03/30/24 0129 03/31/24  0108 04/01/24  0131   SODIUM 135 136 132*   POTASSIUM 3.5* 3.7 3.8   CHLORIDE 106 108 104   CO2 17* 17* 16*   GLUCOSE 114* 117* 145*   BUN 13 11 9     Recent Labs     03/30/24 0129 03/31/24 0108 04/01/24  0131   ALBUMIN 2.6* 2.5* 2.4*   TBILIRUBIN 0.4 0.3 0.4   ALKPHOSPHAT 67 69 65   TOTPROTEIN 5.5* 5.8* 5.5*   ALTSGPT 55* 76* 63*   ASTSGOT 61* 75* 53*   CREATININE 0.39* 0.43* 0.42*       Imaging:  CT-HEAD W/O    Result Date: 3/29/2024  3/29/2024 3:36 PM HISTORY/REASON FOR EXAM:  Acute change in mental status, MRI with embolic strokes. TECHNIQUE/EXAM DESCRIPTION AND NUMBER OF VIEWS: CT of the head without contrast. The study was performed on a helical multidetector CT scanner. Contiguous axial sections were obtained from the skull base through the vertex. Up to date radiation dose reduction adjustments have been utilized to meet ALARA standards for radiation dose reduction. COMPARISON:  MRI brain without and with IV contrast, 3/28/2024. FINDINGS: There is age-related central and cortical atrophy. Diffuse chronic migraines or pelvic white matter changes are evident. There are patchy areas of decreased attenuation at the gray-white matter junctions, compatible with known sites of emboli on the MRI of the brain without and with IV contrast. There is no hemorrhagic transformation. No extra-axial fluid collections. There are postsurgical changes in the globes. Paranasal sinuses, middle ear cavities and mastoid air cells are clear. Calvarium is intact. No shift in midline structures. Basilar and perimesencephalic cisterns are preserved.     1. Stable patchy areas of decreased attenuation at the  gray-white matter junctions in both cerebral hemispheres and cerebellar hemispheres when compared with the prior MRI of the brain performed on 3/28/2024. 2. No hemorrhagic transformation. 3. No other acute findings.     EC-ECHOCARDIOGRAM COMPLETE W/O CONT    Result Date: 3/29/2024  Transthoracic Echo Report Echocardiography Laboratory CONCLUSIONS Hyperdynamic left ventricular systolic function. Visually estimated ejection fraction is 70-75 %. The right ventricle is mildly dilated. Normal right ventricular systolic function. 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 Mild mitral stenosis. Mean transvalvular gradient is 4 mmHg at a heart rate of 72 BPM. Mild mitral regurgitation. 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 Right ventricular systolic pressure is estimated to be 34 mmHg (normal). Compared to the prior study on 10/23/2023, mass on mitral valve is more clearly visualized in this study.  Correlate clinically Primary team is notified of findings. SHAILESH BYRD Exam Date:         2024                    10:21 Exam Location:     Inpatient Priority:          Stat Ordering Physician:        LINK CHURCHILL Referring Physician:       [id number] Sonographer:               Lexie Salmeron [facility] Age:    88     Gender:    F MRN:    [id number] :    [id number] BSA:    1.75   Ht (in):    62     Wt (lb):    163 Exam Type:     Complete Indications:     Endocarditis ICD Codes:       421 CPT Codes:       59814 BP:   145    /   78     HR:   72 Technical Quality:       Good MEASUREMENTS  (Male / Female) Normal Values 2D ECHO LV Diastolic Diameter PLAX        4.9 cm                4.2 - 5.9 / 3.9 - 5.3 cm LV Systolic Diameter PLAX         2.7 cm                2.1 - 4.0 cm IVS Diastolic Thickness           1.1 cm                LVPW Diastolic Thickness          1.3 cm                LVOT Diameter                     1.8 cm                 DOPPLER AV Peak Velocity                  2.4 m/s               AV Peak Gradient                  22.1 mmHg             AV Mean Gradient                  12 mmHg               LVOT Peak Velocity                1 m/s                 AV Area Cont Eq vti               1.4 cm2               MV Velocity Time Integral         39.8 cm               MV Pressure Half Time             77.4 ms               MV Area PHT                       2.8 cm2               TR Peak Velocity                  265 cm/s              PV Peak Velocity                  0.96 m/s              PV Peak Gradient                  3.7 mmHg              * Indicates values subject to auto-interpretation LV EF:        % FINDINGS Left Ventricle Normal left ventricular chamber size. Normal left ventricular wall thickness. Hyperdynamic left ventricular systolic function. Visually estimated ejection fraction is 70-75 %. No regional wall motion abnormalities. A reliable estimation of diastolic function cannot be made due to mitral valve disease. Right Ventricle The right ventricle is mildly dilated. Normal right ventricular systolic function. Right Atrium Normal right atrial size. Normal inferior vena cava size and inspiratory collapse. Left Atrium Moderately dilated left atrium. Left atrial volume index is 45 mL/sq m. Mitral Valve Mobile mass noted on mitral valve (posterior leaflet). Mild mitral stenosis. Mean transvalvular gradient is 4 mmHg at a heart rate of 72 BPM. Mild mitral regurgitation. Aortic Valve Known TAVR aortic valve  that is functioning normally with normal transvalvular gradients. Transvalvular gradients are - Peak: 25 mmHg,  Mean: 14 mmHg. No evidence of paravalvular leak. Tricuspid Valve Structurally normal tricuspid valve. No tricuspid stenosis. Trace tricuspid regurgitation. Right atrial pressure is estimated to be 3 mmHg. Right ventricular systolic pressure is estimated to be 34 mmHg. Pulmonic Valve The pulmonic valve is not well  visualized. No pulmonic stenosis. Mild pulmonic insufficiency. Pericardium No pericardial effusion. Aorta The ascending aorta diameter is 3.3 cm. Iman Zhong MD (Electronically Signed) Final Date:     29 March 2024                 11:55    MR-BRAIN-WITH & W/O    Result Date: 3/29/2024  3/28/2024 3:46 PM HISTORY/REASON FOR EXAM: questinable seizure Altered level of consciousness TECHNIQUE/EXAM DESCRIPTION: T1 sagittal, T2 axial, flair coronal, T1 coronal, and diffusion-weighted axial images were obtained of the brain pre-contrast followed by T1 coronal and axial images post intravenous administration of 15 mL ProHance. COMPARISON: CT brain dated 3/26/2024 FINDINGS: Multiple small foci of acute infarction are noted in the bilateral cerebellum, brainstem involving the left chris, the right midbrain posteriorly, bilateral external capsular regions, right insular region, right frontal region, left medial frontal region,  bilateral parietal cortex and subcortical region, left corpus callosum and bilateral high parietal and frontal convexity cortex and subcortical region. Intracranial flow voids are normal. Gradient-echo images do not demonstrate any evidence of hemorrhage. Postcontrast images do not demonstrate any abnormal intracranial enhancement. Age-related volume loss noted with prominent sulci, cisterns and ventricles. Ventricular dilatation is proportionate to the degree of cerebral volume loss. There is no abnormal intracranial enhancement. The Sella is within normal limits. The craniocervical junction is within normal limits. Visualized intracranial arterial flow voids are within normal limits. Bone marrow signal in the calvarium is within normal limits. Included portions of the paranasal sinuses are within normal limits. Included portions of the mastoid air cells are within normal limits. Included portions of the orbits are within normal limits     Multiple foci of acute infarction scattered throughout the  bilateral cerebral hemispheres, cerebellum and brainstem as described above, suggesting a proximal cardiovascular embolic source. The largest cluster of acute infarctions is noted in the right frontoparietal region involving the right MCA territory. Age-related volume loss.    CT-CTA NECK WITH & W/O-POST PROCESSING    Result Date: 3/26/2024  3/26/2024 4:35 PM HISTORY/REASON FOR EXAM:  code stroke LEFT-sided weakness, altered mental status. TECHNIQUE/EXAM DESCRIPTION: CT angiogram of the neck with contrast. Postcontrast images were obtained of the neck from the great vessels through the skull base following the power injection of nonionic contrast at 5.0 mL/sec. Thin-section helical images were obtained with overlapping reconstruction interval. Coronal and oblique multiplanar volume reformats were generated. Cervical internal carotid artery percent stenosis is calculated using the standard method according to the NASCET criteria wherein a segment of uniform caliber mid or distal cervical internal carotid is used as the reference denominator. 3D angiographic images were reviewed on PACS.  Maximum intensity projection (MIP) images were generated and reviewed 80 mL of Omnipaque 350 nonionic contrast was injected intravenously. Low dose optimization technique was utilized for this CT exam including automated exposure control and adjustment of the mA and/or kV according to patient size. COMPARISON:  None. FINDINGS: Aortic arch: conventional branching pattern. There is atherosclerotic plaque of the aorta. Right common carotid artery: Patent Right internal carotid artery: Atherosclerotic plaque without significant stenosis (less than 50%). Left common carotid artery is patent. Left internal carotid artery: Atherosclerotic plaque without significant stenosis (less than 50%). The right vertebral artery is patent without dissection or stenosis. The left vertebral artery is patent without dissection or stenosis.  LEFT dominant  vertebral artery. Vertebrobasilar confluence: The vertebrobasilar confluence appears normal. Lung apices are clear The soft tissues of the neck are within normal limits. Multilevel degenerative changes cervical spine. 3D angiographic/MIP images of the vasculature confirm the vascular findings as described above.     No focal high-grade stenosis, dissection or occlusion of the cervical carotid or vertebral arteries.    CT-CTA HEAD WITH & W/O-POST PROCESS    Result Date: 3/26/2024  3/26/2024 4:35 PM HISTORY/REASON FOR EXAM:  code stroke.  LEFT-sided weakness, altered mental status. TECHNIQUE/EXAM DESCRIPTION: CT angiogram of the [place] without and with contrast.  Initial precontrast images were obtained of the head from the skull base through the vertex.  Postcontrast images were obtained of the [place] following the power injection of nonionic contrast at 5.0 mL/sec. Thin-section helical images were obtained with overlapping reconstruction interval. Coronal and sagittal multiplanar volume reformats were generated.  3D angiographic images were reviewed on PACS.  Maximum intensity projection (MIP) images were generated and reviewed. 80 mL of Omnipaque 350 nonionic contrast was injected intravenously. Low dose optimization technique was utilized for this CT exam including automated exposure control and adjustment of the mA and/or kV according to patient size. COMPARISON:  CT head 3/26/2024 FINDINGS: The posterior circulation shows the distal vertebral arteries to be patent.  LEFT dominant vertebral artery.  The vertebrobasilar confluence is intact. The basilar artery is patent. No aneurysm or occlusive lesion is evident. Anterior cerebral arteries are normal in caliber and patent.  LEFT middle cerebral artery is patent.  Potential focal narrowing or nonocclusive clot in superior division RIGHT sylvian vessel, M3 segment. 3D angiographic/MIP images of the vasculature confirm the vascular findings as  described above.     1.  Short segment decreased enhancement in the RIGHT superior sylvian division of middle superior artery, M3 segment, focal stenosis versus nonocclusive clot. 2.  No intracranial aneurysm or abrupt large vessel cut off.    CT-HEAD W/O    Result Date: 3/26/2024  3/26/2024 4:40 PM HISTORY/REASON FOR EXAM:  Concern for stroke. TECHNIQUE/EXAM DESCRIPTION AND NUMBER OF VIEWS: CT of the head without contrast. The study was performed on a helical multidetector CT scanner. Contiguous axial sections were obtained from the skull base through the vertex. Up to date radiation dose reduction adjustments have been utilized to meet ALARA standards for radiation dose reduction. COMPARISON:  Noncontrast head CT performed one day prior FINDINGS: Few small scattered parenchymal calcifications suggesting sequela of previous infection or inflammation. Chronic appearing bilateral basal ganglia lacunar infarcts. Hypoattenuating foci in the bilateral cerebellum which are indeterminant. They could be subacute infarcts versus other. Recommend brain MRI workup. Redemonstration of a small focus of low-attenuation in the left parietal deep and subcortical white matter. Possibly gliosis versus subacute infarct. Moderate nonspecific white matter changes. No intracranial mass effect, midline shift, hydrocephalus, hemorrhage. Bilateral maxillary sinus mucosal thickening. Left maxillary sinus mucosal retention cyst. Mastoids in the field of view are unremarkable.     1.  Hypoattenuating foci in the bilateral cerebellum which are indeterminant. They could be subacute infarcts versus other. Recommend brain MRI workup. 2.  Redemonstration of a small focus of low-attenuation in the left parietal deep and subcortical white matter. Possibly gliosis versus subacute infarct. 3.  Findings are stable since the noncontrast head CT performed one day prior.     CT-HEAD W/O    Result Date: 3/25/2024  3/25/2024 4:13 PM HISTORY/REASON FOR EXAM:   ALTERED LEVEL OF CONSCIOUSNESS. Patient found down. TECHNIQUE/EXAM DESCRIPTION AND NUMBER OF VIEWS: CT of the head without contrast. The study was performed on a GE CT scanner. Contiguous 5 mm axial sections were obtained from the skull base through the vertex. Up to date radiation dose reduction adjustments have been utilized to meet ALARA standards for radiation dose reduction. COMPARISON:  None FINDINGS: Small, rounded hypodense areas of the bilateral cerebellar hemispheres. There is a small area of loss of gray-white differentiation in the left parietal lobe. The calvariae and skull base are unremarkable. There are no extraaxial fluid collections. There is a pattern of cerebral atrophy manifest as enlargement of sulcal markings and ventricular prominence.  The ventricular system and basal cisterns are otherwise unremarkable. There are areas of hypodensity in the white matter most consistent with small vessel ischemic change versus demyelination or gliosis. There are no hemorrhagic lesions. There are no mass effects or shift of midline structures. The paranasal sinuses and mastoids in the field of view are unremarkable.     1.  Small, rounded hypodense areas of the bilateral cerebellar hemispheres. These may represent age-indeterminate infarcts. Less likely this could represent edema related to metastatic disease. 2.  There is a small area of loss of gray-white differentiation in the left parietal lobe, possibly an age-indeterminate infarct. 3.  These findings can be further evaluated with contrast-enhanced MRI as indicated. 4.  Cerebral atrophy. 5.  White matter lucencies most consistent with    DX-CHEST-PORTABLE (1 VIEW)    Result Date: 3/25/2024  3/25/2024 2:54 PM HISTORY/REASON FOR EXAM:  Sepsis; sepsis. Altered level of consciousness. TECHNIQUE/EXAM DESCRIPTION AND NUMBER OF VIEWS: Single portable view of the chest. COMPARISON: Chest radiograph, 3/20/2024. FINDINGS: Lungs: No focal opacities were evident.  No pleural effusion or visible pneumothorax. Mediastinum: The descending colon post surgical changes of TAVR. Aortic annular calcifications. Calcified atherosclerotic plaques in aorta. Other: Stable age-related degenerative changes in the spine and shoulders and diffuse decreased bone mineralization.     No acute findings.    DX-CHEST-PORTABLE (1 VIEW)    Result Date: 3/20/2024  3/20/2024 10:49 AM HISTORY/REASON FOR EXAM:  hypoxia TECHNIQUE/EXAM DESCRIPTION AND NUMBER OF VIEWS: Single portable view of the chest. COMPARISON: 9/26/2023 FINDINGS: There is TAVR hardware. HEART: Stable size. There is atherosclerotic calcification in the aortic arch. LUNGS: Lung volumes are low. There is faint LEFT basilar opacity similar to prior. PLEURA: No effusion or pneumothorax.     1.  Persistently enlarged cardiac silhouette 2.  LEFT basilar opacity likely atelectasis rather than pneumonia      Micro:  Results       Procedure Component Value Units Date/Time    BLOOD CULTURE [[id number]] Collected: 03/27/24 0428    Order Status: Completed Specimen: Blood from Peripheral Updated: 04/01/24 0500     Significant Indicator NEG     Source BLD     Site PERIPHERAL     Culture Result No growth after 5 days of incubation.    Narrative:      Left    BLOOD CULTURE [[id number]] Collected: 03/27/24 0409    Order Status: Completed Specimen: Blood from Peripheral Updated: 04/01/24 0500     Significant Indicator NEG     Source BLD     Site PERIPHERAL     Culture Result No growth after 5 days of incubation.    Narrative:      Left Hand    Blood Culture - Draw one from central line and one from peripheral site [[id number]]  (Abnormal) Collected: 03/25/24 1425    Order Status: Completed Specimen: Blood from Peripheral Updated: 03/30/24 1026     Significant Indicator POS     Source BLD     Site PERIPHERAL     Culture Result Growth detected by Bactec instrument. 03/26/2024  12:56      Streptococcus gordonii  Please see newest culture for susceptibilities       Narrative:      CALL  Cramer  61 tel. [phone],  CALLED  61 tel. [phone] 03/26/2024, 17:21, RB PERF. RESULTS CALLED TO:  Voalted to Blood Culture Pharmacy, no ID  No site indicated    E-Test [[id number]] Collected: 03/25/24 1540    Order Status: Completed Specimen: Other Updated: 03/30/24 1024     ETEST Sensitivity FINAL    Narrative:      THAD tel. [phone] 03/28/2024, 05:22, RB PERF. RESULTS CALLED TO: RN [id number]    Blood Culture - Draw one from central line and one from peripheral site [[id number]]  (Abnormal)  (Susceptibility) Collected: 03/25/24 1540    Order Status: Completed Specimen: Blood from Line Updated: 03/30/24 1023     Significant Indicator POS     Source BLD     Site Peripheral     Culture Result Growth detected by Bactec instrument. 03/28/2024  05:17      Streptococcus gordonii    Narrative:      CALL  Cramer  THAD tel. [phone],  CALLED  THAD tel. [phone] 03/28/2024, 05:22, RB PERF. RESULTS CALLED TO: RN  [id number]  Left AC    Susceptibility       Streptococcus gordonii (1)       Antibiotic Interpretation Microscan   Method Status    Penicillin Sensitive 0.032 mcg/mL E-TEST Final    Cefotaxime Sensitive 0.023 mcg/mL E-TEST Final                       Urine Culture (New) [[id number]]  (Abnormal)  (Susceptibility) Collected: 03/25/24 1550    Order Status: Completed Specimen: Urine Updated: 03/27/24 0727     Significant Indicator POS     Source UR     Site -     Culture Result -      Escherichia coli  >100,000 cfu/mL      Narrative:      Indication for culture:->Emergency Room Patient    Susceptibility       Escherichia coli (1)       Antibiotic Interpretation Microscan   Method Status    Ampicillin Resistant >16 mcg/mL MILY Final    Amoxicillin/CA Sensitive <=8/4 mcg/mL MILY Final    Ceftriaxone Sensitive <=1 mcg/mL MILY Final    Cefazolin Sensitive 4 mcg/mL MILY Final     Breakpoints when Cefazolin is used for therapy of infections  other than uncomplicated UTIs due to Enterobacterales are  as  follows:  MILY and Interpretation:  <=2 S  4 I  >=8 R         Ciprofloxacin Sensitive <=0.25 mcg/mL MILY Final    Cefepime Sensitive <=2 mcg/mL MILY Final    Cefuroxime Sensitive 8 mcg/mL MILY Final    Ampicillin/sulbactam Intermediate 16/8 mcg/mL MILY Final    Tobramycin Sensitive <=2 mcg/mL MILY Final    Nitrofurantoin Sensitive <=32 mcg/mL MILY Final    Gentamicin Sensitive <=2 mcg/mL MILY Final    Levofloxacin Sensitive <=0.5 mcg/mL MILY Final    Minocycline Sensitive <=4 mcg/mL MILY Final    Pip/Tazobactam Sensitive <=8 mcg/mL MILY Final    Trimeth/Sulfa Sensitive <=0.5/9.5 mcg/mL MILY Final    Tigecycline Sensitive <=2 mcg/mL MILY Final                       Urinalysis [[id number]]  (Abnormal) Collected: 03/25/24 3543    Order Status: Completed Specimen: Urine Updated: 03/25/24 0111     Color Yellow     Character Clear     Specific Gravity 1.018     Ph 5.5     Glucose Negative mg/dL      Ketones Negative mg/dL      Protein 100 mg/dL      Bilirubin Negative     Urobilinogen, Urine 0.2     Nitrite Positive     Leukocyte Esterase Trace     Occult Blood Small     Micro Urine Req Microscopic            Assessment/Hospital course:  This is an 88-year-old female patient who presented after a fall for somnolence, altered mental status.  She has known protein S deficiency on Xarelto, hypertension, hyperlipidemia.  MRI of the brain with multiple acute infarcts, blood cultures x 2 positive for Strep gordonii, status post TAVR.  TTE with echodensity on the posterior leaflet of the native mitral valve, EKG with second-degree AV block.    Patient had a recent dental procedure about a month ago and has a temporary crown in place.    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  We will follow along.  Of note, according to patient's son, she does not have a well adhered crown in place, just a temporary one so please make sure this is communicated with the proceduralists  -Recommend Panorex or CT maxillofacial to look for dental abscesses  -Recommend MRI of the whole spine when able and if in line with goals of care    Disposition: Anticipate at least 6 weeks of IV antibiotics at a facility  Need for PICC line: Eventually yes, currently not cleared    Discussed with Dr. aPrker.  This illness poses threat to life.  ID will follow

## 2024-04-02 ENCOUNTER — APPOINTMENT (OUTPATIENT)
Dept: RADIOLOGY | Facility: MEDICAL CENTER | Age: 89
DRG: 288 | End: 2024-04-02
Attending: INTERNAL MEDICINE
Payer: MEDICARE

## 2024-04-02 ENCOUNTER — APPOINTMENT (OUTPATIENT)
Dept: CARDIOLOGY | Facility: MEDICAL CENTER | Age: 89
DRG: 288 | End: 2024-04-02
Payer: MEDICARE

## 2024-04-02 PROBLEM — B95.5 BACTEREMIA DUE TO STREPTOCOCCUS: Status: ACTIVE | Noted: 2024-03-26

## 2024-04-02 PROBLEM — E44.0 MODERATE PROTEIN-CALORIE MALNUTRITION (HCC): Status: ACTIVE | Noted: 2024-04-02

## 2024-04-02 PROCEDURE — 97112 NEUROMUSCULAR REEDUCATION: CPT

## 2024-04-02 PROCEDURE — 770020 HCHG ROOM/CARE - TELE (206)

## 2024-04-02 PROCEDURE — 97535 SELF CARE MNGMENT TRAINING: CPT

## 2024-04-02 PROCEDURE — 70487 CT MAXILLOFACIAL W/DYE: CPT

## 2024-04-02 PROCEDURE — 700105 HCHG RX REV CODE 258

## 2024-04-02 PROCEDURE — A9270 NON-COVERED ITEM OR SERVICE: HCPCS | Performed by: STUDENT IN AN ORGANIZED HEALTH CARE EDUCATION/TRAINING PROGRAM

## 2024-04-02 PROCEDURE — 700111 HCHG RX REV CODE 636 W/ 250 OVERRIDE (IP): Mod: JZ

## 2024-04-02 PROCEDURE — 700117 HCHG RX CONTRAST REV CODE 255: Performed by: INTERNAL MEDICINE

## 2024-04-02 PROCEDURE — 99233 SBSQ HOSP IP/OBS HIGH 50: CPT | Performed by: INTERNAL MEDICINE

## 2024-04-02 PROCEDURE — 700102 HCHG RX REV CODE 250 W/ 637 OVERRIDE(OP): Performed by: STUDENT IN AN ORGANIZED HEALTH CARE EDUCATION/TRAINING PROGRAM

## 2024-04-02 RX ADMIN — AMLODIPINE BESYLATE 2.5 MG: 5 TABLET ORAL at 17:20

## 2024-04-02 RX ADMIN — ATORVASTATIN CALCIUM 20 MG: 20 TABLET, FILM COATED ORAL at 17:16

## 2024-04-02 RX ADMIN — SODIUM CHLORIDE 3 MILLION UNITS: 9 INJECTION, SOLUTION INTRAVENOUS at 14:02

## 2024-04-02 RX ADMIN — SODIUM CHLORIDE 3 MILLION UNITS: 9 INJECTION, SOLUTION INTRAVENOUS at 06:25

## 2024-04-02 RX ADMIN — SODIUM CHLORIDE 3 MILLION UNITS: 9 INJECTION, SOLUTION INTRAVENOUS at 02:52

## 2024-04-02 RX ADMIN — SODIUM CHLORIDE 3 MILLION UNITS: 9 INJECTION, SOLUTION INTRAVENOUS at 17:21

## 2024-04-02 RX ADMIN — SODIUM CHLORIDE 3 MILLION UNITS: 9 INJECTION, SOLUTION INTRAVENOUS at 22:28

## 2024-04-02 RX ADMIN — IOHEXOL 80 ML: 350 INJECTION, SOLUTION INTRAVENOUS at 18:20

## 2024-04-02 RX ADMIN — LOSARTAN POTASSIUM 50 MG: 50 TABLET, FILM COATED ORAL at 06:24

## 2024-04-02 RX ADMIN — SODIUM CHLORIDE 3 MILLION UNITS: 9 INJECTION, SOLUTION INTRAVENOUS at 11:21

## 2024-04-02 ASSESSMENT — COGNITIVE AND FUNCTIONAL STATUS - GENERAL
DRESSING REGULAR UPPER BODY CLOTHING: A LOT
EATING MEALS: A LOT
PERSONAL GROOMING: A LOT
SUGGESTED CMS G CODE MODIFIER DAILY ACTIVITY: CL
TOILETING: TOTAL
DRESSING REGULAR LOWER BODY CLOTHING: TOTAL
DAILY ACTIVITIY SCORE: 9
HELP NEEDED FOR BATHING: TOTAL

## 2024-04-02 ASSESSMENT — PAIN DESCRIPTION - PAIN TYPE
TYPE: ACUTE PAIN
TYPE: ACUTE PAIN

## 2024-04-02 ASSESSMENT — FIBROSIS 4 INDEX: FIB4 SCORE: 2.99

## 2024-04-02 NOTE — THERAPY
Occupational Therapy  Daily Treatment     Patient Name: Marry Mathur  Age:  88 y.o., Sex:  female  Medical Record #: [id number]  Today's Date: 4/2/2024     Precautions  Precautions: Fall Risk, Swallow Precautions  Comments: L weakness    Assessment    Pt is progressing slower than expected and continues to be limited by lethargy, poor activity tolerance, impaired balance, weakness, impaired functional cognition. Pt needed totalA for supine<>sit with second person assist, very close guarding assist to min-modA for sitting balance EOB to engage in seated ADLs. Needs multimodal cues and hand-over-hand assist to initiate and follow directions. Pt did not demo any volitional attempt to use her LUE but tolerated weight bearing activity well. Continue to recommend post-acute placement.     Plan    Treatment Plan Status: (P) Continue Current Treatment Plan  Type of Treatment: Self Care / Activities of Daily Living, Therapeutic Activity, Therapeutic Exercises  Treatment Frequency: 3 Times per Week  Treatment Duration: Until Therapy Goals Met    DC Equipment Recommendations: (P) Unable to determine at this time  Discharge Recommendations: (P) Recommend post-acute placement for additional occupational therapy services prior to discharge home    Objective     04/02/24 0916   Pain   Intervention Declines   Non Verbal Descriptors   Non Verbal Scale  Calm   Cognition    Level of Consciousness Responds to voice   Ability To Follow Commands Unable to Follow 1 Step Commands   Safety Awareness Impaired   New Learning Impaired   Attention Impaired   Comments Very delayed responses, follows <50% of one-step directions   Upper Body Muscle Tone   Upper Body Muscle Tone  X   Lt Upper Extremity Muscle Tone Hypotonic;Gross Assist   Neuro-Muscular Treatments   Neuro-Muscular Treatments Facilitation;Postural Changes;Tactile Cuing;Verbal Cuing;Weight Shift Left   Comments WB'ing through LUE and pushing back to midline with support for  wrist and elbow, max cues for initiation. Focus on postural control for static sitting balance without UE support to use RUE functionally for seated ADLs   Other Treatments   Other Treatments Provided 1x10 L shoulder flexion to 90* and elbow flexion/extension sitting EOB   Balance   Sitting Balance (Static) Poor +   Sitting Balance (Dynamic) Trace +   Weight Shift Sitting Poor   Skilled Intervention Facilitation;Sequencing;Tactile Cuing;Verbal Cuing   Bed Mobility    Supine to Sit Total Assist   Sit to Supine Total Assist   Scooting Total Assist   Skilled Intervention Facilitation;Sequencing;Tactile Cuing;Verbal Cuing   Comments two person assist to reposition   Activities of Daily Living   Grooming Maximal Assist  (oral care sitting EOB, max multimodal cues and hand-over-hand assist)   Upper Body Dressing Maximal Assist  (gown)   Lower Body Dressing Total Assist  (socks)   Toileting Total Assist  (purewick, unable to manage any component)   Comments Needs hand over hand assist for one-step directions   How much help from another person does the patient currently need...   Putting on and taking off regular lower body clothing? 1   Bathing (including washing, rinsing, and drying)? 1   Toileting, which includes using a toilet, bedpan, or urinal? 1   Putting on and taking off regular upper body clothing? 2   Taking care of personal grooming such as brushing teeth? 2   Eating meals? 2   6 Clicks Daily Activity Score 9   Functional Mobility   Mobility EOB   Activity Tolerance   Sitting Edge of Bed 15+ min   Patient / Family Goals   Patient / Family Goal #1 to get back home   Goal #1 Outcome Progressing slower than expected   Short Term Goals   Short Term Goal # 1 Pt will txfer to BSC with modA   Short Term Goal # 2 Pt will complete toileting with maxA   Short Term Goal # 3 Pt will complete gown change with modA   Short Term Goal # 4 Pt will pt complete g/h routine sitting EOB with Perlita   Education Group   Upper Ext ROM  Patient Response Patient;Acceptance;Explanation;No Learning Evidence   ADL Patient Response Patient;Acceptance;Explanation;Demonstration;Action Demonstration;No Learning Evidence   Occupational Therapy Treatment Plan    O.T. Treatment Plan Continue Current Treatment Plan   Anticipated Discharge Equipment and Recommendations   DC Equipment Recommendations Unable to determine at this time   Discharge Recommendations Recommend post-acute placement for additional occupational therapy services prior to discharge home   Interdisciplinary Plan of Care Collaboration   IDT Collaboration with  Certified Nursing Assistant;Family / Caregiver   Patient Position at End of Therapy In Bed;Bed Alarm On;Call Light within Reach;Family / Friend in Room   Collaboration Comments CNA assist with bed mobility, RN cleared pt for session; pt's son present and supportive   Session Information   Date / Session Number  4/2 #3 (1/3, 4/8)

## 2024-04-02 NOTE — PROGRESS NOTES
Hospital Medicine Daily Progress Note    Date of Service  4/2/2024    Chief Complaint  Marry Mathur is a 88 y.o. female admitted 3/25/2024 with altered mental status    Hospital Course  88 y.o. female who presented 3/25/2024 with altered mental status.  Per EMS, neighbors checked on patient and found her on the ground, unclear of how long she was actually down on the ground.  She was then brought to ER for evaluation.  Patient found to have UTI, rhabdomyolysis, DASHA. She was admitted for treatment of metabolic encephalopathy. Patient had acute lethargy requiring rapid response 3/26, code stroke activated. She had equivocal EEG and CT head, but MRI brain showed numerous embolic infarcts across bilateral hemispheres concerning for cardioembolic stroke. Blood cultures positive for streptococcus gordonii, urine culture with E coli. Cardiology and ID consulted who recommend VINAY. Repeat blood cultures so far negative. Plan for VINAY when patient is more alert.       Interval Problem Update  Patient was seen and examined at bedside.  No acute events overnight. Patient is resting comfortably in bed and in no acute distress. Son updated at bedside.     Not safe to proceed with VINAY at this time, reevaluate daily  MRI spine ordered  DX panoramic ordered  Poor oral intake - anticipate NG placement  ID recs  Continue IV penicillin    I have discussed this patient's plan of care and discharge plan at IDT rounds today with Case Management, Nursing, Nursing leadership, and other members of the IDT team.    Consultants/Specialty  General Neurology   Vascular Neurology    Code Status  Full Code    Disposition  The patient is not medically cleared for discharge to home or a post-acute facility.      I have placed the appropriate orders for post-discharge needs.    Review of Systems  Review of Systems   Unable to perform ROS: Acuity of condition   All other systems reviewed and are negative.       Physical Exam  Temp:  [36.6 °C  (97.9 °F)-37.2 °C (99 °F)] 36.7 °C (98.1 °F)  Pulse:  [66-89] 70  Resp:  [17-18] 18  BP: (112-137)/(65-85) 121/65  SpO2:  [95 %-97 %] 97 %    Physical Exam  Vitals and nursing note reviewed.   Constitutional:       General: She is not in acute distress.     Appearance: She is ill-appearing and toxic-appearing.   HENT:      Head: Normocephalic and atraumatic.      Right Ear: External ear normal.      Left Ear: External ear normal.      Mouth/Throat:      Pharynx: Oropharynx is clear.   Eyes:      General: No scleral icterus.     Conjunctiva/sclera: Conjunctivae normal.   Cardiovascular:      Rate and Rhythm: Normal rate and regular rhythm.      Pulses: Normal pulses.   Pulmonary:      Effort: Pulmonary effort is normal.      Breath sounds: Normal breath sounds. No wheezing.   Abdominal:      General: Abdomen is flat.      Palpations: Abdomen is soft.      Tenderness: There is no abdominal tenderness. There is no guarding or rebound.   Musculoskeletal:         General: No deformity. Normal range of motion.      Cervical back: Neck supple.   Skin:     General: Skin is warm and dry.      Coloration: Skin is not jaundiced.      Findings: No bruising.   Neurological:      General: No focal deficit present.      Mental Status: She is alert.      Cranial Nerves: No cranial nerve deficit.      Motor: Weakness present.      Comments: A&O x0  Responds to commands  Moves extremities when instructed   Psychiatric:         Mood and Affect: Mood is anxious.      Comments: Unable to assess         Fluids    Intake/Output Summary (Last 24 hours) at 4/2/2024 1258  Last data filed at 4/2/2024 0900  Gross per 24 hour   Intake 120 ml   Output 900 ml   Net -780 ml           Laboratory  Recent Labs     03/31/24  0108 04/01/24  0131   WBC 9.2 7.1   RBC 3.80* 3.77*   HEMOGLOBIN 11.2* 11.1*   HEMATOCRIT 33.7* 33.2*   MCV 88.7 88.1   MCH 29.5 29.4   MCHC 33.2 33.4   RDW 46.4 45.8   PLATELETCT 197 194   MPV 10.1 10.3     Recent Labs      03/31/24  0108 04/01/24  0131   SODIUM 136 132*   POTASSIUM 3.7 3.8   CHLORIDE 108 104   CO2 17* 16*   GLUCOSE 117* 145*   BUN 11 9   CREATININE 0.43* 0.42*   CALCIUM 7.2* 6.8*                     Imaging  CT-HEAD W/O   Final Result      1. Stable patchy areas of decreased attenuation at the gray-white matter junctions in both cerebral hemispheres and cerebellar hemispheres when compared with the prior MRI of the brain performed on 3/28/2024.   2. No hemorrhagic transformation.   3. No other acute findings.         EC-ECHOCARDIOGRAM COMPLETE W/O CONT   Final Result      MR-BRAIN-WITH & W/O   Final Result         Multiple foci of acute infarction scattered throughout the bilateral cerebral hemispheres, cerebellum and brainstem as described above, suggesting a proximal cardiovascular embolic source.      The largest cluster of acute infarctions is noted in the right frontoparietal region involving the right MCA territory.      Age-related volume loss.      CT-CTA NECK WITH & W/O-POST PROCESSING   Final Result      No focal high-grade stenosis, dissection or occlusion of the cervical carotid or vertebral arteries.      CT-CTA HEAD WITH & W/O-POST PROCESS   Final Result      1.  Short segment decreased enhancement in the RIGHT superior sylvian division of middle superior artery, M3 segment, focal stenosis versus nonocclusive clot.   2.  No intracranial aneurysm or abrupt large vessel cut off.      CT-HEAD W/O   Final Result      1.  Hypoattenuating foci in the bilateral cerebellum which are indeterminant. They could be subacute infarcts versus other. Recommend brain MRI workup.   2.  Redemonstration of a small focus of low-attenuation in the left parietal deep and subcortical white matter. Possibly gliosis versus subacute infarct.   3.  Findings are stable since the noncontrast head CT performed one day prior.         CT-HEAD W/O   Final Result      1.  Small, rounded hypodense areas of the bilateral cerebellar  hemispheres. These may represent age-indeterminate infarcts. Less likely this could represent edema related to metastatic disease.   2.  There is a small area of loss of gray-white differentiation in the left parietal lobe, possibly an age-indeterminate infarct.   3.  These findings can be further evaluated with contrast-enhanced MRI as indicated.   4.  Cerebral atrophy.   5.  White matter lucencies most consistent with      DX-CHEST-PORTABLE (1 VIEW)   Final Result      No acute findings.      EC-VINAY W/O CONT    (Results Pending)   MR-CERVICAL SPINE-WITH & W/O    (Results Pending)   MR-THORACIC SPINE-WITH & W/O    (Results Pending)   MR-LUMBAR SPINE-WITH & W/O    (Results Pending)   [id number]    (Results Pending)        Assessment/Plan  * Bacteremia due to Streptococcus  Assessment & Plan  Blood cultures 3/25 with strep gordonii  Repeat blood cultures 3/27 taken  Urine culture positive for E coli  IV penicillin  Check MRI spine  Dx panoramic     Stroke (cerebrum) (HCC)  Assessment & Plan  MRI brain confirms multiple foci of acute infarction bilaterally concerning for cardioembolic strokes  Possible septic emboli from endocarditis  Patient with hx of protein S deficiency and DVT, on anticoagulation at home, currently AC is held  Neurology following  ID and cardiology consulted    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, based on echo showing 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 in setting of positive blood cultures  MRI brain with multiple foci of acute infarcts concerning for cardioembolic stroke  On ceftriaxone  Following blood cultures  ID, neurology, cardiology following  VINAY tomorrow if patient is alert per cardiology    Acute metabolic encephalopathy- (present on admission)  Assessment & Plan  Patient presents with confusion.  UA pertinent for UTI, likely culprit for encephalopathy.  CT head showing no acute changes  Antibiotics for UTI   S/p fluids  On 3/26 code stroke was activated, per vascular  neurology they think stroke unlikely   -CTA did have some narrowing, recommended to keep blood pressure above 120  MRI brain shows multiple foci of acute infarcts across bilateral hemispheres, concerning for cardioembolic stroke  EEG without seizure activity  Echo shows 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, cardiology consulted, plan for VINAY possibly tomorrow Tuesday  improving    Moderate protein-calorie malnutrition (HCC)  Assessment & Plan  Very poor oral intake  Discussed NG with son, he would like to monitor patients intake today  RD consult    Acute cystitis without hematuria  Assessment & Plan  Continue ancef  Urine culture with E coli    S/P TAVR (transcatheter aortic valve replacement)- (present on admission)  Assessment & Plan  Performed in September 2023    Protein S deficiency (HCC)- (present on admission)  Assessment & Plan  Holding home xarelto    History of recurrent deep vein thrombosis (DVT)- (present on admission)  Assessment & Plan  On Xarelto at home  Hold AC for now given strokes    ACP (advance care planning)  Assessment & Plan  Full code per my colleague on admisison    Other hyperlipidemia- (present on admission)  Assessment & Plan  Lipitor    Hypertension  Assessment & Plan  Restart         VTE prophylaxis: SCDs    Greater than 51 minutes spent prepping to see patient (e.g. review of tests) obtaining and/or reviewing separately obtained history. Performing a medically appropriate examination and/ evaluation.  Counseling and educating the patient/family/caregiver.  Ordering medications, tests, or procedures.  Referring and communicating with other health care professionals.  Documenting clinical information in EPIC.  Independently interpreting results and communicating results to patient/family/caregiver.  Care coordination.

## 2024-04-02 NOTE — CARE PLAN
The patient is Stable - Low risk of patient condition declining or worsening    Shift Goals  Clinical Goals: Monitor neuro status, maintain skin integrity  Patient Goals: sleep  Family Goals: wilmer    Progress made toward(s) clinical / shift goals:      Problem: Neuro Status  Goal: Neuro status will remain stable or improve  Outcome: Progressing    Q4H neuro checks in place. Pt's neurological status remains unchanged throughout shift. Bed alarm is on, call light within reach.     Problem: Skin Integrity  Goal: Skin integrity is maintained or improved  Outcome: Progressing   Pt turned and repositioned Q2H or as requested. Barrier cream and mepilexes used to prevent skin breakdown on delicate perineal areas and bony prominences. Linen changes provided as needed to avoid risk of developing pressure ulcers.     Patient is not progressing towards the following goals:

## 2024-04-02 NOTE — PROGRESS NOTES
Monitor summary: SR 70-81, AR 0.19, QRS 0.09, QT 0.42 with rare PVCs, PACs, and trigems, per strip from monitor room.

## 2024-04-02 NOTE — THERAPY
Speech Language Therapy Contact Note    Patient Name: Marry Mathur  Age:  88 y.o., Sex:  female  Medical Record #: [id number]  Today's Date: 4/2/2024    Discussed missed therapy with MARCO [place].        04/02/24 1313   Treatment Variance   Reason For Missed Therapy Medical - Other (Please Comment)   Interdisciplinary Plan of Care Collaboration   IDT Collaboration with  Nursing   Collaboration Comments Patient NPO for VINAY this date; however, deemed not appropriate to proceed r/t lethargy. Patient planned for NGT r/t poor PO intake. Service to hold cognitive evaluation/dysphagia management r/t poor mentation/lethargy.

## 2024-04-02 NOTE — DIETARY
"Nutrition Support Assessment   Day 8 of admit.  Marry Mathur is a 88 y.o. female with admitting DX of Acute metabolic encephalopathy.     Current problem list:  Moderate protein calorie malnutrition  Endocarditis  Stroke  Acute cystitis without hematuria  Bacteremia d/t streptococcus  Acute metabolic encephalopathy  S/p TAVR  Protein S deficiency  Hx of recurrent DVT  ACP  HTN  HLD     Assessment:  Estimated Nutritional Needs: based on:   Height: 157.5 cm (5' 2.01\")  Weight: 74 kg (163 lbs 2.2 oz) - bed scale on admit  Body mass index is 29.83 kg/m²., BMI classification: overweight    Calculation/Equation: REE per MSJ x 1.2 = 1356 kcals  Total Calories / day: 1350 - 1650 (Calories / k - 22)  Total Grams Protein / day: 89 - 111 (Grams Protein / k.2 - 1.5)     Evaluation:   Consult received for TF r/t poor oral intake and lethargy.   Plan for IRIS feeding tube placement pending confirmation of enteral access on KUB scan.  Pt's weight on ID in chart is 162 lbs and current weight is 163 lbs.   Per ADLs, pt ate % of breakfast this am. PO intake has mainly been 50-75% of meals.  Pt is adequately nourished.   Skin: skin tear buttocks, no edema noted.   Labs: Na 132, glucose 145, Creat 0.42, Correct Ca 8.1, AST 53, ALT 63  Meds: lipitor, penicillin with potassium, D5 1/2 NS  Last BM: 3/29 incontinence  CHO-controlled formula is indicated for blood sugar control and to meet estimated protein needs.      Malnutrition Risk: Pt appears adequately nourished, no new risk identified.     Recommendations/Plan:  Start TF Glucerna 1.2 at 25 ml/hr and advance per protocol to goal rate of 60 ml/hr to provide 1728 kcals, 86 gm protein, and 1159 ml free water per day.  Fluids per MD.   Diet upgrades per SLP/MD. Pt remains on full liquid diet with Ensure Plus at BID and magic cup BID.    RD following.                              "

## 2024-04-02 NOTE — PROGRESS NOTES
Monitor Summary: SR 74-87, ID 0.20, QRS 0.10, QT 0.39, with occasional PACs and frequent PVCs per strip from monitor room.

## 2024-04-03 LAB
ALBUMIN SERPL BCP-MCNC: 2.4 G/DL (ref 3.2–4.9)
BASOPHILS # BLD AUTO: 0.5 % (ref 0–1.8)
BASOPHILS # BLD: 0.03 K/UL (ref 0–0.12)
BUN SERPL-MCNC: 10 MG/DL (ref 8–22)
CALCIUM ALBUM COR SERPL-MCNC: 8.5 MG/DL (ref 8.5–10.5)
CALCIUM SERPL-MCNC: 7.2 MG/DL (ref 8.5–10.5)
CHLORIDE SERPL-SCNC: 100 MMOL/L (ref 96–112)
CO2 SERPL-SCNC: 20 MMOL/L (ref 20–33)
CREAT SERPL-MCNC: 0.38 MG/DL (ref 0.5–1.4)
EOSINOPHIL # BLD AUTO: 0.09 K/UL (ref 0–0.51)
EOSINOPHIL NFR BLD: 1.4 % (ref 0–6.9)
ERYTHROCYTE [DISTWIDTH] IN BLOOD BY AUTOMATED COUNT: 43.7 FL (ref 35.9–50)
GFR SERPLBLD CREATININE-BSD FMLA CKD-EPI: 97 ML/MIN/1.73 M 2
GLUCOSE SERPL-MCNC: 161 MG/DL (ref 65–99)
HCT VFR BLD AUTO: 33 % (ref 37–47)
HGB BLD-MCNC: 11 G/DL (ref 12–16)
IMM GRANULOCYTES # BLD AUTO: 0.02 K/UL (ref 0–0.11)
IMM GRANULOCYTES NFR BLD AUTO: 0.3 % (ref 0–0.9)
LYMPHOCYTES # BLD AUTO: 0.63 K/UL (ref 1–4.8)
LYMPHOCYTES NFR BLD: 9.5 % (ref 22–41)
MAGNESIUM SERPL-MCNC: 1.7 MG/DL (ref 1.5–2.5)
MCH RBC QN AUTO: 28.8 PG (ref 27–33)
MCHC RBC AUTO-ENTMCNC: 33.3 G/DL (ref 32.2–35.5)
MCV RBC AUTO: 86.4 FL (ref 81.4–97.8)
MONOCYTES # BLD AUTO: 0.93 K/UL (ref 0–0.85)
MONOCYTES NFR BLD AUTO: 14 % (ref 0–13.4)
NEUTROPHILS # BLD AUTO: 4.95 K/UL (ref 1.82–7.42)
NEUTROPHILS NFR BLD: 74.3 % (ref 44–72)
NRBC # BLD AUTO: 0 K/UL
NRBC BLD-RTO: 0 /100 WBC (ref 0–0.2)
PHOSPHATE SERPL-MCNC: 1.3 MG/DL (ref 2.5–4.5)
PLATELET # BLD AUTO: 203 K/UL (ref 164–446)
PMV BLD AUTO: 10.1 FL (ref 9–12.9)
POTASSIUM SERPL-SCNC: 3.4 MMOL/L (ref 3.6–5.5)
PREALB SERPL-MCNC: 5 MG/DL (ref 18–38)
RBC # BLD AUTO: 3.82 M/UL (ref 4.2–5.4)
SODIUM SERPL-SCNC: 130 MMOL/L (ref 135–145)
WBC # BLD AUTO: 6.7 K/UL (ref 4.8–10.8)

## 2024-04-03 PROCEDURE — 99233 SBSQ HOSP IP/OBS HIGH 50: CPT | Performed by: INTERNAL MEDICINE

## 2024-04-03 PROCEDURE — 85025 COMPLETE CBC W/AUTO DIFF WBC: CPT

## 2024-04-03 PROCEDURE — 700101 HCHG RX REV CODE 250: Performed by: INTERNAL MEDICINE

## 2024-04-03 PROCEDURE — 700105 HCHG RX REV CODE 258: Performed by: STUDENT IN AN ORGANIZED HEALTH CARE EDUCATION/TRAINING PROGRAM

## 2024-04-03 PROCEDURE — 700111 HCHG RX REV CODE 636 W/ 250 OVERRIDE (IP): Mod: JZ

## 2024-04-03 PROCEDURE — 97110 THERAPEUTIC EXERCISES: CPT | Mod: CQ

## 2024-04-03 PROCEDURE — 770020 HCHG ROOM/CARE - TELE (206)

## 2024-04-03 PROCEDURE — 92526 ORAL FUNCTION THERAPY: CPT

## 2024-04-03 PROCEDURE — 700105 HCHG RX REV CODE 258: Performed by: INTERNAL MEDICINE

## 2024-04-03 PROCEDURE — 99233 SBSQ HOSP IP/OBS HIGH 50: CPT | Mod: 25 | Performed by: INTERNAL MEDICINE

## 2024-04-03 PROCEDURE — 700105 HCHG RX REV CODE 258

## 2024-04-03 PROCEDURE — 700102 HCHG RX REV CODE 250 W/ 637 OVERRIDE(OP): Performed by: INTERNAL MEDICINE

## 2024-04-03 PROCEDURE — 83735 ASSAY OF MAGNESIUM: CPT

## 2024-04-03 PROCEDURE — 97530 THERAPEUTIC ACTIVITIES: CPT | Mod: CQ

## 2024-04-03 PROCEDURE — A9270 NON-COVERED ITEM OR SERVICE: HCPCS | Performed by: STUDENT IN AN ORGANIZED HEALTH CARE EDUCATION/TRAINING PROGRAM

## 2024-04-03 PROCEDURE — 84134 ASSAY OF PREALBUMIN: CPT

## 2024-04-03 PROCEDURE — 97112 NEUROMUSCULAR REEDUCATION: CPT | Mod: CQ

## 2024-04-03 PROCEDURE — 700102 HCHG RX REV CODE 250 W/ 637 OVERRIDE(OP): Performed by: STUDENT IN AN ORGANIZED HEALTH CARE EDUCATION/TRAINING PROGRAM

## 2024-04-03 PROCEDURE — 36415 COLL VENOUS BLD VENIPUNCTURE: CPT

## 2024-04-03 PROCEDURE — 80069 RENAL FUNCTION PANEL: CPT

## 2024-04-03 PROCEDURE — 99497 ADVNCD CARE PLAN 30 MIN: CPT | Performed by: INTERNAL MEDICINE

## 2024-04-03 PROCEDURE — A9270 NON-COVERED ITEM OR SERVICE: HCPCS | Performed by: INTERNAL MEDICINE

## 2024-04-03 RX ORDER — ACETAMINOPHEN 325 MG/1
650 TABLET ORAL EVERY 6 HOURS PRN
Status: DISCONTINUED | OUTPATIENT
Start: 2024-04-03 | End: 2024-04-09 | Stop reason: HOSPADM

## 2024-04-03 RX ORDER — AMLODIPINE BESYLATE 2.5 MG/1
2.5 TABLET ORAL EVERY EVENING
Status: DISCONTINUED | OUTPATIENT
Start: 2024-04-03 | End: 2024-04-09 | Stop reason: HOSPADM

## 2024-04-03 RX ORDER — LOSARTAN POTASSIUM 50 MG/1
50 TABLET ORAL DAILY
Status: DISCONTINUED | OUTPATIENT
Start: 2024-04-04 | End: 2024-04-09 | Stop reason: HOSPADM

## 2024-04-03 RX ORDER — ATORVASTATIN CALCIUM 20 MG/1
20 TABLET, FILM COATED ORAL EVERY EVENING
Status: DISCONTINUED | OUTPATIENT
Start: 2024-04-03 | End: 2024-04-09 | Stop reason: HOSPADM

## 2024-04-03 RX ADMIN — SODIUM CHLORIDE 3 MILLION UNITS: 9 INJECTION, SOLUTION INTRAVENOUS at 22:29

## 2024-04-03 RX ADMIN — SODIUM CHLORIDE 3 MILLION UNITS: 9 INJECTION, SOLUTION INTRAVENOUS at 11:20

## 2024-04-03 RX ADMIN — SODIUM CHLORIDE 3 MILLION UNITS: 9 INJECTION, SOLUTION INTRAVENOUS at 03:11

## 2024-04-03 RX ADMIN — SODIUM CHLORIDE 3 MILLION UNITS: 9 INJECTION, SOLUTION INTRAVENOUS at 06:12

## 2024-04-03 RX ADMIN — ATORVASTATIN CALCIUM 20 MG: 20 TABLET, FILM COATED ORAL at 17:56

## 2024-04-03 RX ADMIN — AMLODIPINE BESYLATE 2.5 MG: 2.5 TABLET ORAL at 17:56

## 2024-04-03 RX ADMIN — SODIUM CHLORIDE 3 MILLION UNITS: 9 INJECTION, SOLUTION INTRAVENOUS at 15:54

## 2024-04-03 RX ADMIN — LOSARTAN POTASSIUM 50 MG: 50 TABLET, FILM COATED ORAL at 06:10

## 2024-04-03 RX ADMIN — DEXTROSE AND SODIUM CHLORIDE: 5; 450 INJECTION, SOLUTION INTRAVENOUS at 11:15

## 2024-04-03 RX ADMIN — POTASSIUM PHOSPHATE, MONOBASIC AND POTASSIUM PHOSPHATE, DIBASIC 30 MMOL: 224; 236 INJECTION, SOLUTION, CONCENTRATE INTRAVENOUS at 17:55

## 2024-04-03 RX ADMIN — SODIUM CHLORIDE 3 MILLION UNITS: 9 INJECTION, SOLUTION INTRAVENOUS at 17:56

## 2024-04-03 ASSESSMENT — COGNITIVE AND FUNCTIONAL STATUS - GENERAL
CLIMB 3 TO 5 STEPS WITH RAILING: TOTAL
STANDING UP FROM CHAIR USING ARMS: A LOT
WALKING IN HOSPITAL ROOM: TOTAL
SUGGESTED CMS G CODE MODIFIER MOBILITY: CM
TURNING FROM BACK TO SIDE WHILE IN FLAT BAD: TOTAL
MOVING FROM LYING ON BACK TO SITTING ON SIDE OF FLAT BED: A LOT
MOBILITY SCORE: 9
MOVING TO AND FROM BED TO CHAIR: A LOT

## 2024-04-03 ASSESSMENT — PAIN DESCRIPTION - PAIN TYPE: TYPE: ACUTE PAIN

## 2024-04-03 ASSESSMENT — GAIT ASSESSMENTS: GAIT LEVEL OF ASSIST: UNABLE TO PARTICIPATE

## 2024-04-03 ASSESSMENT — FIBROSIS 4 INDEX: FIB4 SCORE: 2.86

## 2024-04-03 NOTE — THERAPY
"Speech Language Pathology   Daily Treatment     Patient Name: Marry Mathur  AGE:  88 y.o., SEX:  female  Medical Record #: [id number]  Date of Service: 4/3/2024      Precautions:  Precautions: Fall Risk, Swallow Precautions       Subjective  RN cleared pt for session. Pt received awake, son at bedside. Pt's son endorsed pt was communicating some with him this AM; however, since has had \"a blank stare.\" Pt did not answer orientation questions. Intermittent bursts of speech during session, pt stated \"it tastes nice\" in regards to ice chips. However, pt not communicating or participating in interview questions for majority of session. Son reported requesting NGT d/t observing pt quickly fatiguing following two bites of PO.      Assessment  PO of ice chips and thin liquids via tsp/straw. Appropriate oral bolus acceptance and containment upon presentation without need for cues. Pt intermittently did require cues to utilize straw appropriately, observed to bite straw. Single cough observed following thin liquid via tsp. No cough or throat clear with ice chips of thin liquids via straw. Unable to assess for vocal quality d/t limited verbalizations from pt during session. Single swallow completed per bolus. As session progressed, fatigue and increased WOB observed, thus further trials abandoned.       Clinical Impressions  Limited PO trials observed this date 2/2 pt AMS and lethargy. Pt is appropriate for continuation of full liquid diet when pt is awake and alert only with 1:1 supervision. Recommend continuation of NGT for nutrition d/t limited oral intake. SLP to follow to monitor for changes and advance diet as pt is appropriate.      Recommendations  Diet Consistency: Full/thin liquids  Instrumentation: Instrumental swallow study pending clinical progress  Medication: Crush with applesauce, as appropriate  Supervision: Careful 1:1 hand feeding  Positioning: Fully upright and midline during oral intake  Risk " "Management : Small bites/sips, Slow rate of intake  Oral Care: Q6h      SLP Treatment Plan  Treatment Plan: Dysphagia Treatment  Estimated Duration: Until Therapy Goals Met      Anticipated Discharge Needs  Discharge Recommendations: Recommend post-acute placement for additional speech therapy services prior to discharge home  Therapy Recommendations Upon DC: Dysphagia Training, Patient / Family / Caregiver Education      Patient / Family Goals  Patient / Family Goal #1: \"I like the ice\"  Goal #1 Outcome: Progressing as expected  Short Term Goals  Short Term Goal # 1: Patient will consume a full/thin liquid diet without overt indicators of airway invasion or decline in pulmonary status.  Goal Outcome # 1: Progressing slower than expected      STEFANY España  "

## 2024-04-03 NOTE — CARE PLAN
The patient is Stable - Low risk of patient condition declining or worsening    Shift Goals  Clinical Goals: safety, IV abx  Patient Goals: TIM  Family Goals: cardiac chair    Progress made toward(s) clinical / shift goals: up to cardiac chair this shift.       Problem: Skin Integrity  Goal: Skin integrity is maintained or improved  Outcome: Progressing  Note: Skin remained clean and dry this shift. Purewick in place. Turns in bed, chair this shift.      Problem: Fall Risk  Goal: Patient will remain free from falls  Outcome: Progressing  Note: Pt remained free from falls. Goal for no falls by time of dc. Fall band on. Bed alarm on.

## 2024-04-03 NOTE — DOCUMENTATION QUERY
"                                                                         formerly [facility]                                                                       Query Response Note      PATIENT:               SHAILESH BYRD  ACCT #:                  [id number]  MRN:                     [id number]  :                      [id number]  ADMIT DATE:       3/25/2024 2:18 PM  DISCH DATE:          RESPONDING  PROVIDER #:        [id number]           QUERY TEXT:    Please provide additional clinical indicators supportive of the documented diagnosis of \"DASHA\".      The patient's Clinical Indicators include:  (PN ) \"Patient found to have UTI, rhabdomyolysis, DASHA.\"    Lab Values:  CREAT: : 0.62, : 0.51, : 0.60, : 0.39                        BUN: : 21, : 15, : 16, : 12    Risk Factors: Native mitral valve infective endocarditis, Strep gordonii bacteremia, UTI, rhabdomyolysis,  Metabolic encephalopathy, Second-degree AV block, Protein S deficiency    Tx: ceftriaxone, S/p fluids    If you have any questions, please contact:  Ariel Kc RN [facility]  [email or web address]  Ariel Kc [address]  Options provided:   -- Acute Kidney Injury exists, (please document additional clinical indicators)   -- Acute Kidney Injury does not exist and amended documentation provided in the medical record   -- Other explanation, (please specify other explanation)      Query created by: Ariel Kc on 2024 9:15 AM    RESPONSE TEXT:    Acute Kidney Injury does not exist and amended documentation provided in the medical record          Electronically signed by:  JENNIFER MCCALL MD 4/3/2024 11:38 AM              "

## 2024-04-03 NOTE — PROGRESS NOTES
Infectious Disease Progress Note    Author: Giancarlo Ervin M.D. Date & Time of service: 4/3/2024  8:06 AM    Chief Complaint:  Follow-up for bacteremia    Interval History:  3/30 Tmax 99.3, white count down to 11.5, tolerated switch to ceftriaxone, cultures as below, creatinine 0.39, mildly elevated transaminases,   3/31 patient is afebrile, white count down to 9.2, tolerated switch to penicillin, creatinine 0.43, mildly elevated transaminases stable, cultures as below   patient remains afebrile, count is 7.1, tolerated switch to penicillin, creatinine 0.42, mildly elevated transaminases improving now, repeat blood cultures no growth till date  4/3 patient remains afebrile, count 6.7, tolerating penicillin, remains somnolent overall, creatinine 0.38, prealbumin 5, cultures as below    Labs Reviewed and Medications Reviewed.    Review of Systems:  Review of Systems   Unable to perform ROS: Medical condition       Hemodynamics:  Temp (24hrs), Av.8 °C (98.2 °F), Min:36.5 °C (97.7 °F), Max:37.1 °C (98.8 °F)  Temperature: 36.5 °C (97.7 °F)  Pulse  Av.8  Min: 48  Max: 97   Blood Pressure : 130/69       Physical Exam:  Physical Exam  Vitals and nursing note reviewed.   Constitutional:       General: She is not in acute distress.     Appearance: She is ill-appearing.      Comments: Asleep, snoring, did not awaken with light stimuli   HENT:      Mouth/Throat:      Pharynx: No oropharyngeal exudate.   Cardiovascular:      Rate and Rhythm: Normal rate.   Pulmonary:      Effort: Pulmonary effort is normal. No respiratory distress.      Breath sounds: No stridor.   Abdominal:      General: There is no distension.   Musculoskeletal:         General: No swelling or tenderness.   Skin:     Findings: No erythema or rash.         Meds:    Current Facility-Administered Medications:     Pharmacy    penicillin g    D5 1/2 NS    amLODIPine    atorvastatin    losartan    [Held by provider] rivaroxaban     acetaminophen    Labs:  Recent Labs     04/01/24  0131 04/03/24  0119   WBC 7.1 6.7   RBC 3.77* 3.82*   HEMOGLOBIN 11.1* 11.0*   HEMATOCRIT 33.2* 33.0*   MCV 88.1 86.4   MCH 29.4 28.8   RDW 45.8 43.7   PLATELETCT 194 203   MPV 10.3 10.1   NEUTSPOLYS 76.00* 74.30*   LYMPHOCYTES 9.30* 9.50*   MONOCYTES 12.30 14.00*   EOSINOPHILS 1.30 1.40   BASOPHILS 0.70 0.50     Recent Labs     04/01/24  0131 04/03/24  0119   SODIUM 132* 130*   POTASSIUM 3.8 3.4*   CHLORIDE 104 100   CO2 16* 20   GLUCOSE 145* 161*   BUN 9 10     Recent Labs     04/01/24 0131 04/03/24  0119   ALBUMIN 2.4* 2.4*   TBILIRUBIN 0.4  --    ALKPHOSPHAT 65  --    TOTPROTEIN 5.5*  --    ALTSGPT 63*  --    ASTSGOT 53*  --    CREATININE 0.42* 0.38*       Imaging:  CT-HEAD W/O    Result Date: 3/29/2024  3/29/2024 3:36 PM HISTORY/REASON FOR EXAM:  Acute change in mental status, MRI with embolic strokes. TECHNIQUE/EXAM DESCRIPTION AND NUMBER OF VIEWS: CT of the head without contrast. The study was performed on a helical multidetector CT scanner. Contiguous axial sections were obtained from the skull base through the vertex. Up to date radiation dose reduction adjustments have been utilized to meet ALARA standards for radiation dose reduction. COMPARISON:  MRI brain without and with IV contrast, 3/28/2024. FINDINGS: There is age-related central and cortical atrophy. Diffuse chronic migraines or pelvic white matter changes are evident. There are patchy areas of decreased attenuation at the gray-white matter junctions, compatible with known sites of emboli on the MRI of the brain without and with IV contrast. There is no hemorrhagic transformation. No extra-axial fluid collections. There are postsurgical changes in the globes. Paranasal sinuses, middle ear cavities and mastoid air cells are clear. Calvarium is intact. No shift in midline structures. Basilar and perimesencephalic cisterns are preserved.     1. Stable patchy areas of decreased attenuation at the  gray-white matter junctions in both cerebral hemispheres and cerebellar hemispheres when compared with the prior MRI of the brain performed on 3/28/2024. 2. No hemorrhagic transformation. 3. No other acute findings.     EC-ECHOCARDIOGRAM COMPLETE W/O CONT    Result Date: 3/29/2024  Transthoracic Echo Report Echocardiography Laboratory CONCLUSIONS Hyperdynamic left ventricular systolic function. Visually estimated ejection fraction is 70-75 %. The right ventricle is mildly dilated. Normal right ventricular systolic function. 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 Mild mitral stenosis. Mean transvalvular gradient is 4 mmHg at a heart rate of 72 BPM. Mild mitral regurgitation. 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 Right ventricular systolic pressure is estimated to be 34 mmHg (normal). Compared to the prior study on 10/23/2023, mass on mitral valve is more clearly visualized in this study.  Correlate clinically Primary team is notified of findings. SHAILESH BYRD Exam Date:         2024                    10:21 Exam Location:     Inpatient Priority:          Stat Ordering Physician:        LINK CHURCHILL Referring Physician:       [id number] Sonographer:               Lexie Salmeron [facility] Age:    88     Gender:    F MRN:    [id number] :    [id number] BSA:    1.75   Ht (in):    62     Wt (lb):    163 Exam Type:     Complete Indications:     Endocarditis ICD Codes:       421 CPT Codes:       86022 BP:   145    /   78     HR:   72 Technical Quality:       Good MEASUREMENTS  (Male / Female) Normal Values 2D ECHO LV Diastolic Diameter PLAX        4.9 cm                4.2 - 5.9 / 3.9 - 5.3 cm LV Systolic Diameter PLAX         2.7 cm                2.1 - 4.0 cm IVS Diastolic Thickness           1.1 cm                LVPW Diastolic Thickness          1.3 cm                LVOT Diameter                     1.8 cm                 DOPPLER AV Peak Velocity                  2.4 m/s               AV Peak Gradient                  22.1 mmHg             AV Mean Gradient                  12 mmHg               LVOT Peak Velocity                1 m/s                 AV Area Cont Eq vti               1.4 cm2               MV Velocity Time Integral         39.8 cm               MV Pressure Half Time             77.4 ms               MV Area PHT                       2.8 cm2               TR Peak Velocity                  265 cm/s              PV Peak Velocity                  0.96 m/s              PV Peak Gradient                  3.7 mmHg              * Indicates values subject to auto-interpretation LV EF:        % FINDINGS Left Ventricle Normal left ventricular chamber size. Normal left ventricular wall thickness. Hyperdynamic left ventricular systolic function. Visually estimated ejection fraction is 70-75 %. No regional wall motion abnormalities. A reliable estimation of diastolic function cannot be made due to mitral valve disease. Right Ventricle The right ventricle is mildly dilated. Normal right ventricular systolic function. Right Atrium Normal right atrial size. Normal inferior vena cava size and inspiratory collapse. Left Atrium Moderately dilated left atrium. Left atrial volume index is 45 mL/sq m. Mitral Valve Mobile mass noted on mitral valve (posterior leaflet). Mild mitral stenosis. Mean transvalvular gradient is 4 mmHg at a heart rate of 72 BPM. Mild mitral regurgitation. Aortic Valve Known TAVR aortic valve  that is functioning normally with normal transvalvular gradients. Transvalvular gradients are - Peak: 25 mmHg,  Mean: 14 mmHg. No evidence of paravalvular leak. Tricuspid Valve Structurally normal tricuspid valve. No tricuspid stenosis. Trace tricuspid regurgitation. Right atrial pressure is estimated to be 3 mmHg. Right ventricular systolic pressure is estimated to be 34 mmHg. Pulmonic Valve The pulmonic valve is not well  visualized. No pulmonic stenosis. Mild pulmonic insufficiency. Pericardium No pericardial effusion. Aorta The ascending aorta diameter is 3.3 cm. Iman Zhong MD (Electronically Signed) Final Date:     29 March 2024                 11:55    MR-BRAIN-WITH & W/O    Result Date: 3/29/2024  3/28/2024 3:46 PM HISTORY/REASON FOR EXAM: questinable seizure Altered level of consciousness TECHNIQUE/EXAM DESCRIPTION: T1 sagittal, T2 axial, flair coronal, T1 coronal, and diffusion-weighted axial images were obtained of the brain pre-contrast followed by T1 coronal and axial images post intravenous administration of 15 mL ProHance. COMPARISON: CT brain dated 3/26/2024 FINDINGS: Multiple small foci of acute infarction are noted in the bilateral cerebellum, brainstem involving the left chris, the right midbrain posteriorly, bilateral external capsular regions, right insular region, right frontal region, left medial frontal region,  bilateral parietal cortex and subcortical region, left corpus callosum and bilateral high parietal and frontal convexity cortex and subcortical region. Intracranial flow voids are normal. Gradient-echo images do not demonstrate any evidence of hemorrhage. Postcontrast images do not demonstrate any abnormal intracranial enhancement. Age-related volume loss noted with prominent sulci, cisterns and ventricles. Ventricular dilatation is proportionate to the degree of cerebral volume loss. There is no abnormal intracranial enhancement. The Sella is within normal limits. The craniocervical junction is within normal limits. Visualized intracranial arterial flow voids are within normal limits. Bone marrow signal in the calvarium is within normal limits. Included portions of the paranasal sinuses are within normal limits. Included portions of the mastoid air cells are within normal limits. Included portions of the orbits are within normal limits     Multiple foci of acute infarction scattered throughout the  bilateral cerebral hemispheres, cerebellum and brainstem as described above, suggesting a proximal cardiovascular embolic source. The largest cluster of acute infarctions is noted in the right frontoparietal region involving the right MCA territory. Age-related volume loss.    CT-CTA NECK WITH & W/O-POST PROCESSING    Result Date: 3/26/2024  3/26/2024 4:35 PM HISTORY/REASON FOR EXAM:  code stroke LEFT-sided weakness, altered mental status. TECHNIQUE/EXAM DESCRIPTION: CT angiogram of the neck with contrast. Postcontrast images were obtained of the neck from the great vessels through the skull base following the power injection of nonionic contrast at 5.0 mL/sec. Thin-section helical images were obtained with overlapping reconstruction interval. Coronal and oblique multiplanar volume reformats were generated. Cervical internal carotid artery percent stenosis is calculated using the standard method according to the NASCET criteria wherein a segment of uniform caliber mid or distal cervical internal carotid is used as the reference denominator. 3D angiographic images were reviewed on PACS.  Maximum intensity projection (MIP) images were generated and reviewed 80 mL of Omnipaque 350 nonionic contrast was injected intravenously. Low dose optimization technique was utilized for this CT exam including automated exposure control and adjustment of the mA and/or kV according to patient size. COMPARISON:  None. FINDINGS: Aortic arch: conventional branching pattern. There is atherosclerotic plaque of the aorta. Right common carotid artery: Patent Right internal carotid artery: Atherosclerotic plaque without significant stenosis (less than 50%). Left common carotid artery is patent. Left internal carotid artery: Atherosclerotic plaque without significant stenosis (less than 50%). The right vertebral artery is patent without dissection or stenosis. The left vertebral artery is patent without dissection or stenosis.  LEFT dominant  vertebral artery. Vertebrobasilar confluence: The vertebrobasilar confluence appears normal. Lung apices are clear The soft tissues of the neck are within normal limits. Multilevel degenerative changes cervical spine. 3D angiographic/MIP images of the vasculature confirm the vascular findings as described above.     No focal high-grade stenosis, dissection or occlusion of the cervical carotid or vertebral arteries.    CT-CTA HEAD WITH & W/O-POST PROCESS    Result Date: 3/26/2024  3/26/2024 4:35 PM HISTORY/REASON FOR EXAM:  code stroke.  LEFT-sided weakness, altered mental status. TECHNIQUE/EXAM DESCRIPTION: CT angiogram of the [place] without and with contrast.  Initial precontrast images were obtained of the head from the skull base through the vertex.  Postcontrast images were obtained of the [place] following the power injection of nonionic contrast at 5.0 mL/sec. Thin-section helical images were obtained with overlapping reconstruction interval. Coronal and sagittal multiplanar volume reformats were generated.  3D angiographic images were reviewed on PACS.  Maximum intensity projection (MIP) images were generated and reviewed. 80 mL of Omnipaque 350 nonionic contrast was injected intravenously. Low dose optimization technique was utilized for this CT exam including automated exposure control and adjustment of the mA and/or kV according to patient size. COMPARISON:  CT head 3/26/2024 FINDINGS: The posterior circulation shows the distal vertebral arteries to be patent.  LEFT dominant vertebral artery.  The vertebrobasilar confluence is intact. The basilar artery is patent. No aneurysm or occlusive lesion is evident. Anterior cerebral arteries are normal in caliber and patent.  LEFT middle cerebral artery is patent.  Potential focal narrowing or nonocclusive clot in superior division RIGHT sylvian vessel, M3 segment. 3D angiographic/MIP images of the vasculature confirm the vascular findings as  described above.     1.  Short segment decreased enhancement in the RIGHT superior sylvian division of middle superior artery, M3 segment, focal stenosis versus nonocclusive clot. 2.  No intracranial aneurysm or abrupt large vessel cut off.    CT-HEAD W/O    Result Date: 3/26/2024  3/26/2024 4:40 PM HISTORY/REASON FOR EXAM:  Concern for stroke. TECHNIQUE/EXAM DESCRIPTION AND NUMBER OF VIEWS: CT of the head without contrast. The study was performed on a helical multidetector CT scanner. Contiguous axial sections were obtained from the skull base through the vertex. Up to date radiation dose reduction adjustments have been utilized to meet ALARA standards for radiation dose reduction. COMPARISON:  Noncontrast head CT performed one day prior FINDINGS: Few small scattered parenchymal calcifications suggesting sequela of previous infection or inflammation. Chronic appearing bilateral basal ganglia lacunar infarcts. Hypoattenuating foci in the bilateral cerebellum which are indeterminant. They could be subacute infarcts versus other. Recommend brain MRI workup. Redemonstration of a small focus of low-attenuation in the left parietal deep and subcortical white matter. Possibly gliosis versus subacute infarct. Moderate nonspecific white matter changes. No intracranial mass effect, midline shift, hydrocephalus, hemorrhage. Bilateral maxillary sinus mucosal thickening. Left maxillary sinus mucosal retention cyst. Mastoids in the field of view are unremarkable.     1.  Hypoattenuating foci in the bilateral cerebellum which are indeterminant. They could be subacute infarcts versus other. Recommend brain MRI workup. 2.  Redemonstration of a small focus of low-attenuation in the left parietal deep and subcortical white matter. Possibly gliosis versus subacute infarct. 3.  Findings are stable since the noncontrast head CT performed one day prior.     CT-HEAD W/O    Result Date: 3/25/2024  3/25/2024 4:13 PM HISTORY/REASON FOR EXAM:   ALTERED LEVEL OF CONSCIOUSNESS. Patient found down. TECHNIQUE/EXAM DESCRIPTION AND NUMBER OF VIEWS: CT of the head without contrast. The study was performed on a GE CT scanner. Contiguous 5 mm axial sections were obtained from the skull base through the vertex. Up to date radiation dose reduction adjustments have been utilized to meet ALARA standards for radiation dose reduction. COMPARISON:  None FINDINGS: Small, rounded hypodense areas of the bilateral cerebellar hemispheres. There is a small area of loss of gray-white differentiation in the left parietal lobe. The calvariae and skull base are unremarkable. There are no extraaxial fluid collections. There is a pattern of cerebral atrophy manifest as enlargement of sulcal markings and ventricular prominence.  The ventricular system and basal cisterns are otherwise unremarkable. There are areas of hypodensity in the white matter most consistent with small vessel ischemic change versus demyelination or gliosis. There are no hemorrhagic lesions. There are no mass effects or shift of midline structures. The paranasal sinuses and mastoids in the field of view are unremarkable.     1.  Small, rounded hypodense areas of the bilateral cerebellar hemispheres. These may represent age-indeterminate infarcts. Less likely this could represent edema related to metastatic disease. 2.  There is a small area of loss of gray-white differentiation in the left parietal lobe, possibly an age-indeterminate infarct. 3.  These findings can be further evaluated with contrast-enhanced MRI as indicated. 4.  Cerebral atrophy. 5.  White matter lucencies most consistent with    DX-CHEST-PORTABLE (1 VIEW)    Result Date: 3/25/2024  3/25/2024 2:54 PM HISTORY/REASON FOR EXAM:  Sepsis; sepsis. Altered level of consciousness. TECHNIQUE/EXAM DESCRIPTION AND NUMBER OF VIEWS: Single portable view of the chest. COMPARISON: Chest radiograph, 3/20/2024. FINDINGS: Lungs: No focal opacities were evident.  No pleural effusion or visible pneumothorax. Mediastinum: The descending colon post surgical changes of TAVR. Aortic annular calcifications. Calcified atherosclerotic plaques in aorta. Other: Stable age-related degenerative changes in the spine and shoulders and diffuse decreased bone mineralization.     No acute findings.    DX-CHEST-PORTABLE (1 VIEW)    Result Date: 3/20/2024  3/20/2024 10:49 AM HISTORY/REASON FOR EXAM:  hypoxia TECHNIQUE/EXAM DESCRIPTION AND NUMBER OF VIEWS: Single portable view of the chest. COMPARISON: 9/26/2023 FINDINGS: There is TAVR hardware. HEART: Stable size. There is atherosclerotic calcification in the aortic arch. LUNGS: Lung volumes are low. There is faint LEFT basilar opacity similar to prior. PLEURA: No effusion or pneumothorax.     1.  Persistently enlarged cardiac silhouette 2.  LEFT basilar opacity likely atelectasis rather than pneumonia      Micro:  Results       Procedure Component Value Units Date/Time    BLOOD CULTURE [[id number]] Collected: 03/27/24 0428    Order Status: Completed Specimen: Blood from Peripheral Updated: 04/01/24 0500     Significant Indicator NEG     Source BLD     Site PERIPHERAL     Culture Result No growth after 5 days of incubation.    Narrative:      Left    BLOOD CULTURE [[id number]] Collected: 03/27/24 0409    Order Status: Completed Specimen: Blood from Peripheral Updated: 04/01/24 0500     Significant Indicator NEG     Source BLD     Site PERIPHERAL     Culture Result No growth after 5 days of incubation.    Narrative:      Left Hand    Blood Culture - Draw one from central line and one from peripheral site [[id number]]  (Abnormal) Collected: 03/25/24 1425    Order Status: Completed Specimen: Blood from Peripheral Updated: 03/30/24 1026     Significant Indicator POS     Source BLD     Site PERIPHERAL     Culture Result Growth detected by Bactec instrument. 03/26/2024  12:56      Streptococcus gordonii  Please see newest culture for susceptibilities       Narrative:      CALL  Cramer  61 tel. [phone],  CALLED  61 tel. [phone] 03/26/2024, 17:21, RB PERF. RESULTS CALLED TO:  Voalted to Blood Culture Pharmacy, no ID  No site indicated    E-Test [[id number]] Collected: 03/25/24 1540    Order Status: Completed Specimen: Other Updated: 03/30/24 1024     ETEST Sensitivity FINAL    Narrative:      THAD tel. [phone] 03/28/2024, 05:22, RB PERF. RESULTS CALLED TO: RN [id number]    Blood Culture - Draw one from central line and one from peripheral site [[id number]]  (Abnormal)  (Susceptibility) Collected: 03/25/24 1540    Order Status: Completed Specimen: Blood from Line Updated: 03/30/24 1023     Significant Indicator POS     Source BLD     Site Peripheral     Culture Result Growth detected by Bactec instrument. 03/28/2024  05:17      Streptococcus gordonii    Narrative:      CALL  Cramer  THAD tel. [phone],  CALLED  THAD tel. [phone] 03/28/2024, 05:22, RB PERF. RESULTS CALLED TO: RN  [id number]  Left AC    Susceptibility       Streptococcus gordonii (1)       Antibiotic Interpretation Microscan   Method Status    Penicillin Sensitive 0.032 mcg/mL E-TEST Final    Cefotaxime Sensitive 0.023 mcg/mL E-TEST Final                               Assessment/Hospital course:  This is an 88-year-old female patient who presented after a fall for somnolence, altered mental status.  She has known protein S deficiency on Xarelto, hypertension, hyperlipidemia.  MRI of the brain with multiple acute infarcts, blood cultures x 2 positive for Strep gordonii, status post TAVR.  TTE with echodensity on the posterior leaflet of the native mitral valve, EKG with second-degree AV block concerning for perivalvular abscess.    Patient had a recent dental procedure about a month ago and has a temporary crown in place.    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  We will follow along.  Of note, according to patient's son, she does not have a well adhered crown in place, just a temporary one so please make sure this is communicated with the proceduralists  -CT maxillofacial with no evidence of dental abscesses  -Recommend MRI of the whole spine when able and if in line with goals of care, ordered and pending    Disposition: Anticipate at least 6 weeks of IV antibiotics at a facility  Need for PICC line: Eventually yes, currently not cleared    Discussed with Dr. Parker.  This illness poses threat to life.  ID will follow

## 2024-04-03 NOTE — PROGRESS NOTES
Monitor Summary: SR 68-85, MT .0.19, QRS .0.09, QT .0.44 with frequent PACs, frequent PVCs and rare couplets per strip from monitor room

## 2024-04-03 NOTE — CARE PLAN
The patient is Stable - Low risk of patient condition declining or worsening    Shift Goals  Clinical Goals: Stable neuro status, Maintain skin integrity  Patient Goals: Rest  Family Goals: wilmer    Progress made toward(s) clinical / shift goals:    Problem: Pain - Standard  Goal: Alleviation of pain or a reduction in pain to the patient’s comfort goal  Outcome: Progressing  Note: Pt resting comfortably, verbally denies pain.     Problem: Skin Integrity  Goal: Skin integrity is maintained or improved  Outcome: Progressing  Note: Pt on BETSY, taps in place, heel float boots in place, q2 hr turns performed.        Patient is not progressing towards the following goals:      Problem: Knowledge Deficit - Standard  Goal: Patient and family/care givers will demonstrate understanding of plan of care, disease process/condition, diagnostic tests and medications  Outcome: Not Progressing  Note: Pt unable to demonstrate understanding of plan of care.

## 2024-04-03 NOTE — CARE PLAN
The patient is Stable - Low risk of patient condition declining or worsening    Shift Goals  Clinical Goals: Monitor neuro status, maintain skin integrity, NG insertion  Patient Goals: Rest  Family Goals: wilmer    Progress made toward(s) clinical / shift goals: NG tube inserted.      Problem: Neuro Status  Goal: Neuro status will remain stable or improve  Outcome: Progressing    Q4H neuro checks in place. Pt's neurological status remains unchanged throughout shift. Bed alarm is on, call light within reach.     Problem: Skin Integrity  Goal: Skin integrity is maintained or improved  Outcome: Progressing   Pt turned and repositioned Q2H or as requested. Barrier cream and mepilexes used to prevent skin breakdown on delicate perineal areas and bony prominences. Linen changes provided as needed to avoid risk of developing pressure ulcers.     Patient is not progressing towards the following goals:

## 2024-04-03 NOTE — PROGRESS NOTES
Hospital Medicine Daily Progress Note    Date of Service  4/3/2024    Chief Complaint  Marry Mathur is a 88 y.o. female admitted 3/25/2024 with altered mental status    Hospital Course  88 y.o. female who presented 3/25/2024 with altered mental status.  Per EMS, neighbors checked on patient and found her on the ground, unclear of how long she was actually down on the ground.  She was then brought to ER for evaluation.  Patient found to have UTI, rhabdomyolysis, DASHA. She was admitted for treatment of metabolic encephalopathy. Patient had acute lethargy requiring rapid response 3/26, code stroke activated. She had equivocal EEG and CT head, but MRI brain showed numerous embolic infarcts across bilateral hemispheres concerning for cardioembolic stroke. Blood cultures positive for streptococcus gordonii, urine culture with E coli. Cardiology and ID consulted who recommend VINAY. Repeat blood cultures so far negative. Plan for VINAY when patient is more alert.       Interval Problem Update  Patient was seen and examined at bedside.  No acute events overnight. Patient is resting comfortably in bed and in no acute distress. Son updated at bedside.     Not safe to proceed with VINAY at this time, reevaluate daily  MRI spine ordered  CT maxillofacial reviewed  Tube feeds  ID recs  Continue IV penicillin  MRI ordered    I have discussed this patient's plan of care and discharge plan at IDT rounds today with Case Management, Nursing, Nursing leadership, and other members of the IDT team.    Consultants/Specialty  General Neurology   Vascular Neurology    Code Status  Full Code    Disposition  The patient is not medically cleared for discharge to home or a post-acute facility.    Patient son prefers that patient go to SNF in [place] when clear for discharge    I have placed the appropriate orders for post-discharge needs.    Review of Systems  Review of Systems   Unable to perform ROS: Acuity of condition   All other systems  reviewed and are negative.       Physical Exam  Temp:  [36.5 °C (97.7 °F)-37.1 °C (98.8 °F)] 36.6 °C (97.9 °F)  Pulse:  [68-77] 68  Resp:  [18] 18  BP: (128-145)/(69-77) 131/69  SpO2:  [92 %-98 %] 94 %    Physical Exam  Vitals and nursing note reviewed.   Constitutional:       General: She is not in acute distress.     Appearance: She is ill-appearing and toxic-appearing.   HENT:      Head: Normocephalic and atraumatic.      Right Ear: External ear normal.      Left Ear: External ear normal.      Mouth/Throat:      Pharynx: Oropharynx is clear.   Eyes:      General: No scleral icterus.     Conjunctiva/sclera: Conjunctivae normal.   Cardiovascular:      Rate and Rhythm: Normal rate and regular rhythm.      Pulses: Normal pulses.   Pulmonary:      Effort: Pulmonary effort is normal.      Breath sounds: Normal breath sounds. No wheezing.   Abdominal:      General: Abdomen is flat.      Palpations: Abdomen is soft.      Tenderness: There is no abdominal tenderness. There is no guarding or rebound.   Musculoskeletal:         General: No deformity. Normal range of motion.      Cervical back: Neck supple.   Skin:     General: Skin is warm and dry.      Coloration: Skin is not jaundiced.      Findings: No bruising.   Neurological:      General: No focal deficit present.      Mental Status: She is alert.      Cranial Nerves: No cranial nerve deficit.      Motor: Weakness present.      Comments: A&O x0  Responds to commands  Moves extremities when instructed   Psychiatric:         Mood and Affect: Mood is anxious.      Comments: Unable to assess         Fluids    Intake/Output Summary (Last 24 hours) at 4/3/2024 1206  Last data filed at 4/3/2024 0400  Gross per 24 hour   Intake 90 ml   Output 2050 ml   Net -1960 ml           Laboratory  Recent Labs     04/01/24  0131 04/03/24  0119   WBC 7.1 6.7   RBC 3.77* 3.82*   HEMOGLOBIN 11.1* 11.0*   HEMATOCRIT 33.2* 33.0*   MCV 88.1 86.4   MCH 29.4 28.8   MCHC 33.4 33.3   RDW 45.8 43.7    PLATELETCT 194 203   MPV 10.3 10.1     Recent Labs     04/01/24  0131 04/03/24  0119   SODIUM 132* 130*   POTASSIUM 3.8 3.4*   CHLORIDE 104 100   CO2 16* 20   GLUCOSE 145* 161*   BUN 9 10   CREATININE 0.42* 0.38*   CALCIUM 6.8* 7.2*                     Imaging  DX-ABDOMEN FOR TUBE PLACEMENT   Final Result         1.  Hazy left lower lobe infiltrates   2.  Small left pleural effusion   3.  Nasogastric tube tip terminates overlying the expected location of the gastric body.   4.  Cardiomegaly   5.  Atherosclerotic      CT-MAXILLOFACIAL WITH PLUS RECONS   Final Result      1.  Mild mucosal thickening noted in paranasal sinuses. No air-fluid levels.      2.  No soft tissue fluid collection or abscess identified.      3.  No bone erosion or bone destruction is identified.      CT-HEAD W/O   Final Result      1. Stable patchy areas of decreased attenuation at the gray-white matter junctions in both cerebral hemispheres and cerebellar hemispheres when compared with the prior MRI of the brain performed on 3/28/2024.   2. No hemorrhagic transformation.   3. No other acute findings.         EC-ECHOCARDIOGRAM COMPLETE W/O CONT   Final Result      MR-BRAIN-WITH & W/O   Final Result         Multiple foci of acute infarction scattered throughout the bilateral cerebral hemispheres, cerebellum and brainstem as described above, suggesting a proximal cardiovascular embolic source.      The largest cluster of acute infarctions is noted in the right frontoparietal region involving the right MCA territory.      Age-related volume loss.      CT-CTA NECK WITH & W/O-POST PROCESSING   Final Result      No focal high-grade stenosis, dissection or occlusion of the cervical carotid or vertebral arteries.      CT-CTA HEAD WITH & W/O-POST PROCESS   Final Result      1.  Short segment decreased enhancement in the RIGHT superior sylvian division of middle superior artery, M3 segment, focal stenosis versus nonocclusive clot.   2.  No intracranial  aneurysm or abrupt large vessel cut off.      CT-HEAD W/O   Final Result      1.  Hypoattenuating foci in the bilateral cerebellum which are indeterminant. They could be subacute infarcts versus other. Recommend brain MRI workup.   2.  Redemonstration of a small focus of low-attenuation in the left parietal deep and subcortical white matter. Possibly gliosis versus subacute infarct.   3.  Findings are stable since the noncontrast head CT performed one day prior.         CT-HEAD W/O   Final Result      1.  Small, rounded hypodense areas of the bilateral cerebellar hemispheres. These may represent age-indeterminate infarcts. Less likely this could represent edema related to metastatic disease.   2.  There is a small area of loss of gray-white differentiation in the left parietal lobe, possibly an age-indeterminate infarct.   3.  These findings can be further evaluated with contrast-enhanced MRI as indicated.   4.  Cerebral atrophy.   5.  White matter lucencies most consistent with      DX-CHEST-PORTABLE (1 VIEW)   Final Result      No acute findings.      EC-VINAY W/O CONT    (Results Pending)   MR-CERVICAL SPINE-WITH & W/O    (Results Pending)   MR-THORACIC SPINE-WITH & W/O    (Results Pending)   MR-LUMBAR SPINE-WITH & W/O    (Results Pending)        Assessment/Plan  * Bacteremia due to Streptococcus  Assessment & Plan  Blood cultures 3/25 with strep gordonii  Repeat blood cultures 3/27 taken  Urine culture positive for E coli  Echo shows 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, cardiology consulted  VINAY when patient stable for procedure  Check MRI spine  Continue IV penicillin    Stroke (cerebrum) (HCC)  Assessment & Plan  MRI brain confirms multiple foci of acute infarction bilaterally concerning for cardioembolic strokes  Possible septic emboli from endocarditis  Patient with hx of protein S deficiency and DVT, on anticoagulation at home, currently AC is held  Neurology following      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,  based on echo showing 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 in setting of positive blood cultures  MRI brain with multiple foci of acute infarcts concerning for cardioembolic stroke  On ceftriaxone  Following blood cultures  ID, neurology, cardiology following  Echo shows 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, cardiology consulted  VINAY when patient stable for procedure    Acute metabolic encephalopathy- (present on admission)  Assessment & Plan  Patient presents with confusion.  UA pertinent for UTI, likely culprit for encephalopathy.  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, cardiology consulted  VINAY when patient stable for procedure    Moderate protein-calorie malnutrition (HCC)  Assessment & Plan  Very poor oral intake  Tube feeds    Acute cystitis without hematuria  Assessment & Plan  Continue ancef  Urine culture with E coli    S/P TAVR (transcatheter aortic valve replacement)- (present on admission)  Assessment & Plan  Performed in September 2023    Protein S deficiency (HCC)- (present on admission)  Assessment & Plan  Holding home xarelto    History of recurrent deep vein thrombosis (DVT)- (present on admission)  Assessment & Plan  On Xarelto at home  Hold AC for now given strokes    ACP (advance care planning)  Assessment & Plan  Full code per my colleague on admission    I addressed goals of care and plan of care with patients son (Nando) at bedside. Patient is unable to answer orientation questioning appropriately. Per patients son, patient was very independent prior to admission. Patient has no family members who reside in [place]. Patient is being treated for  bacteremia, endocarditis. Patient has had very poor oral intake and tube feeds were started 04/04. Nando is hopeful that patient will improve and be able to discharge to SNF in [place], [place]. We will see how patient improves with tube feeds and adequate nutrition. Total of 21 minutes spent in discussion and coordination of advance care planning.     Other hyperlipidemia- (present on admission)  Assessment & Plan  Lipitor    Hypertension  Assessment & Plan  Restart         VTE prophylaxis: SCDs    Greater than 54 minutes spent prepping to see patient (e.g. review of tests) obtaining and/or reviewing separately obtained history. Performing a medically appropriate examination and/ evaluation.  Counseling and educating the patient/family/caregiver.  Ordering medications, tests, or procedures.  Referring and communicating with other health care professionals.  Documenting clinical information in EPIC.  Independently interpreting results and communicating results to patient/family/caregiver.  Care coordination.

## 2024-04-03 NOTE — DIETARY
Nutrition Services Brief Update:    Problem: Nutritional:  Goal: Nutrition support tolerated and meeting greater than 85% of estimated needs  Outcome: MET    Pt is receiving TF Glucerna 1.2 at goal rate 60 ml/hr. K and Phos low today, noted KPhos starting on MAR. SLP saw today and recommends full liquids with thin liquid diet.    RD following.

## 2024-04-03 NOTE — PROGRESS NOTES
Monitor Summary: SR 68-75, UT 0.18, QRS 0.10, QT 0.42, with occasional PACs/PVCs per strip from monitor room.

## 2024-04-03 NOTE — PROGRESS NOTES
"Primary RN and Charge RN inserted NG tube at 2100. Dx-abdomen for tube placement ordered. NG tube taped with primapore, gastric contents noted in the tube.     Dx-abdomen for tube placement results - \"Nasogastric tube tip terminates overlying the expected location of the gastric body\". Tube feed (Glucerna) started at 25ml/hour at 2200. Will advance by 25ml in 8 hours (~0600) if pt tolerating feeds.   "

## 2024-04-04 ENCOUNTER — APPOINTMENT (OUTPATIENT)
Dept: RADIOLOGY | Facility: MEDICAL CENTER | Age: 89
DRG: 288 | End: 2024-04-04
Attending: INTERNAL MEDICINE
Payer: MEDICARE

## 2024-04-04 ENCOUNTER — APPOINTMENT (OUTPATIENT)
Dept: UROLOGY | Facility: MEDICAL CENTER | Age: 89
End: 2024-04-04
Payer: MEDICARE

## 2024-04-04 LAB
ALBUMIN SERPL BCP-MCNC: 2.6 G/DL (ref 3.2–4.9)
BASOPHILS # BLD AUTO: 0.4 % (ref 0–1.8)
BASOPHILS # BLD: 0.03 K/UL (ref 0–0.12)
BUN SERPL-MCNC: 12 MG/DL (ref 8–22)
CALCIUM ALBUM COR SERPL-MCNC: 9.2 MG/DL (ref 8.5–10.5)
CALCIUM SERPL-MCNC: 8.1 MG/DL (ref 8.5–10.5)
CHLORIDE SERPL-SCNC: 96 MMOL/L (ref 96–112)
CO2 SERPL-SCNC: 21 MMOL/L (ref 20–33)
CREAT SERPL-MCNC: 0.44 MG/DL (ref 0.5–1.4)
EOSINOPHIL # BLD AUTO: 0.13 K/UL (ref 0–0.51)
EOSINOPHIL NFR BLD: 1.8 % (ref 0–6.9)
ERYTHROCYTE [DISTWIDTH] IN BLOOD BY AUTOMATED COUNT: 43.2 FL (ref 35.9–50)
GFR SERPLBLD CREATININE-BSD FMLA CKD-EPI: 93 ML/MIN/1.73 M 2
GLUCOSE SERPL-MCNC: 143 MG/DL (ref 65–99)
HCT VFR BLD AUTO: 33.8 % (ref 37–47)
HGB BLD-MCNC: 11.6 G/DL (ref 12–16)
IMM GRANULOCYTES # BLD AUTO: 0.04 K/UL (ref 0–0.11)
IMM GRANULOCYTES NFR BLD AUTO: 0.5 % (ref 0–0.9)
LYMPHOCYTES # BLD AUTO: 0.75 K/UL (ref 1–4.8)
LYMPHOCYTES NFR BLD: 10.1 % (ref 22–41)
MAGNESIUM SERPL-MCNC: 1.6 MG/DL (ref 1.5–2.5)
MCH RBC QN AUTO: 29.5 PG (ref 27–33)
MCHC RBC AUTO-ENTMCNC: 34.3 G/DL (ref 32.2–35.5)
MCV RBC AUTO: 86 FL (ref 81.4–97.8)
MONOCYTES # BLD AUTO: 0.82 K/UL (ref 0–0.85)
MONOCYTES NFR BLD AUTO: 11.1 % (ref 0–13.4)
NEUTROPHILS # BLD AUTO: 5.63 K/UL (ref 1.82–7.42)
NEUTROPHILS NFR BLD: 76.1 % (ref 44–72)
NRBC # BLD AUTO: 0 K/UL
NRBC BLD-RTO: 0 /100 WBC (ref 0–0.2)
PHOSPHATE SERPL-MCNC: 4.6 MG/DL (ref 2.5–4.5)
PLATELET # BLD AUTO: 214 K/UL (ref 164–446)
PMV BLD AUTO: 10.1 FL (ref 9–12.9)
POTASSIUM SERPL-SCNC: 3.9 MMOL/L (ref 3.6–5.5)
RBC # BLD AUTO: 3.93 M/UL (ref 4.2–5.4)
SODIUM SERPL-SCNC: 130 MMOL/L (ref 135–145)
WBC # BLD AUTO: 7.4 K/UL (ref 4.8–10.8)

## 2024-04-04 PROCEDURE — 51798 US URINE CAPACITY MEASURE: CPT

## 2024-04-04 PROCEDURE — 85025 COMPLETE CBC W/AUTO DIFF WBC: CPT

## 2024-04-04 PROCEDURE — A9579 GAD-BASE MR CONTRAST NOS,1ML: HCPCS | Performed by: INTERNAL MEDICINE

## 2024-04-04 PROCEDURE — 80069 RENAL FUNCTION PANEL: CPT

## 2024-04-04 PROCEDURE — 72156 MRI NECK SPINE W/O & W/DYE: CPT

## 2024-04-04 PROCEDURE — 83735 ASSAY OF MAGNESIUM: CPT

## 2024-04-04 PROCEDURE — 36415 COLL VENOUS BLD VENIPUNCTURE: CPT

## 2024-04-04 PROCEDURE — 700117 HCHG RX CONTRAST REV CODE 255: Performed by: INTERNAL MEDICINE

## 2024-04-04 PROCEDURE — 700111 HCHG RX REV CODE 636 W/ 250 OVERRIDE (IP)

## 2024-04-04 PROCEDURE — 700102 HCHG RX REV CODE 250 W/ 637 OVERRIDE(OP): Performed by: INTERNAL MEDICINE

## 2024-04-04 PROCEDURE — 72158 MRI LUMBAR SPINE W/O & W/DYE: CPT

## 2024-04-04 PROCEDURE — 72157 MRI CHEST SPINE W/O & W/DYE: CPT

## 2024-04-04 PROCEDURE — A9270 NON-COVERED ITEM OR SERVICE: HCPCS | Performed by: INTERNAL MEDICINE

## 2024-04-04 PROCEDURE — 770020 HCHG ROOM/CARE - TELE (206)

## 2024-04-04 PROCEDURE — 700105 HCHG RX REV CODE 258

## 2024-04-04 PROCEDURE — 99233 SBSQ HOSP IP/OBS HIGH 50: CPT | Performed by: INTERNAL MEDICINE

## 2024-04-04 RX ADMIN — SODIUM CHLORIDE 3 MILLION UNITS: 9 INJECTION, SOLUTION INTRAVENOUS at 14:22

## 2024-04-04 RX ADMIN — ATORVASTATIN CALCIUM 20 MG: 20 TABLET, FILM COATED ORAL at 18:08

## 2024-04-04 RX ADMIN — LOSARTAN POTASSIUM 50 MG: 50 TABLET, FILM COATED ORAL at 05:30

## 2024-04-04 RX ADMIN — GADOTERIDOL 18 ML: 279.3 INJECTION, SOLUTION INTRAVENOUS at 09:47

## 2024-04-04 RX ADMIN — SODIUM CHLORIDE 3 MILLION UNITS: 9 INJECTION, SOLUTION INTRAVENOUS at 18:10

## 2024-04-04 RX ADMIN — AMLODIPINE BESYLATE 2.5 MG: 2.5 TABLET ORAL at 18:08

## 2024-04-04 RX ADMIN — SODIUM CHLORIDE 3 MILLION UNITS: 9 INJECTION, SOLUTION INTRAVENOUS at 11:29

## 2024-04-04 RX ADMIN — SODIUM CHLORIDE 3 MILLION UNITS: 9 INJECTION, SOLUTION INTRAVENOUS at 02:23

## 2024-04-04 RX ADMIN — SODIUM CHLORIDE 3 MILLION UNITS: 9 INJECTION, SOLUTION INTRAVENOUS at 06:24

## 2024-04-04 RX ADMIN — SODIUM CHLORIDE 3 MILLION UNITS: 9 INJECTION, SOLUTION INTRAVENOUS at 22:41

## 2024-04-04 ASSESSMENT — FIBROSIS 4 INDEX: FIB4 SCORE: 2.71

## 2024-04-04 ASSESSMENT — PAIN DESCRIPTION - PAIN TYPE: TYPE: ACUTE PAIN

## 2024-04-04 NOTE — PROGRESS NOTES
Infectious Disease Progress Note    Author: Giancarlo Ervin M.D. Date & Time of service: 2024  8:06 AM    Chief Complaint:  Follow-up for bacteremia    Interval History:  3/30 Tmax 99.3, white count down to 11.5, tolerated switch to ceftriaxone, cultures as below, creatinine 0.39, mildly elevated transaminases,   3/31 patient is afebrile, white count down to 9.2, tolerated switch to penicillin, creatinine 0.43, mildly elevated transaminases stable, cultures as below   patient remains afebrile, count is 7.1, tolerated switch to penicillin, creatinine 0.42, mildly elevated transaminases improving now, repeat blood cultures no growth till date  4/3 patient remains afebrile, count 6.7, tolerating penicillin, remains somnolent overall, creatinine 0.38, prealbumin 5, cultures as below   Tmax 99, white count 7.4, cultures remain no growth to date, creatinine is 0.44, magnesium 1.6, going down to MRI    Labs Reviewed and Medications Reviewed.    Review of Systems:  Review of Systems   Unable to perform ROS: Medical condition       Hemodynamics:  Temp (24hrs), Av.7 °C (98.1 °F), Min:36.4 °C (97.5 °F), Max:37.2 °C (99 °F)  Temperature: 36.4 °C (97.5 °F)  Pulse  Av.5  Min: 48  Max: 97   Blood Pressure : (!) 151/72 (Nurse notified )       Physical Exam:  Physical Exam  Vitals and nursing note reviewed.   Constitutional:       General: She is not in acute distress.     Appearance: She is ill-appearing.      Comments: Minimal interaction, going down to MRI   HENT:      Mouth/Throat:      Pharynx: No oropharyngeal exudate.   Cardiovascular:      Rate and Rhythm: Normal rate.   Pulmonary:      Effort: Pulmonary effort is normal. No respiratory distress.      Breath sounds: No stridor.   Abdominal:      General: There is no distension.   Musculoskeletal:         General: No swelling or tenderness.   Skin:     Findings: No erythema or rash.         Meds:    Current Facility-Administered Medications:      acetaminophen    amLODIPine    atorvastatin    losartan    Pharmacy    penicillin g    [Held by provider] rivaroxaban    Labs:  Recent Labs     04/03/24  0119 04/04/24  0108   WBC 6.7 7.4   RBC 3.82* 3.93*   HEMOGLOBIN 11.0* 11.6*   HEMATOCRIT 33.0* 33.8*   MCV 86.4 86.0   MCH 28.8 29.5   RDW 43.7 43.2   PLATELETCT 203 214   MPV 10.1 10.1   NEUTSPOLYS 74.30* 76.10*   LYMPHOCYTES 9.50* 10.10*   MONOCYTES 14.00* 11.10   EOSINOPHILS 1.40 1.80   BASOPHILS 0.50 0.40     Recent Labs     04/03/24  0119 04/04/24  0108   SODIUM 130* 130*   POTASSIUM 3.4* 3.9   CHLORIDE 100 96   CO2 20 21   GLUCOSE 161* 143*   BUN 10 12     Recent Labs     04/03/24  0119 04/04/24  0108   ALBUMIN 2.4* 2.6*   CREATININE 0.38* 0.44*       Imaging:  CT-HEAD W/O    Result Date: 3/29/2024  3/29/2024 3:36 PM HISTORY/REASON FOR EXAM:  Acute change in mental status, MRI with embolic strokes. TECHNIQUE/EXAM DESCRIPTION AND NUMBER OF VIEWS: CT of the head without contrast. The study was performed on a helical multidetector CT scanner. Contiguous axial sections were obtained from the skull base through the vertex. Up to date radiation dose reduction adjustments have been utilized to meet ALARA standards for radiation dose reduction. COMPARISON:  MRI brain without and with IV contrast, 3/28/2024. FINDINGS: There is age-related central and cortical atrophy. Diffuse chronic migraines or pelvic white matter changes are evident. There are patchy areas of decreased attenuation at the gray-white matter junctions, compatible with known sites of emboli on the MRI of the brain without and with IV contrast. There is no hemorrhagic transformation. No extra-axial fluid collections. There are postsurgical changes in the globes. Paranasal sinuses, middle ear cavities and mastoid air cells are clear. Calvarium is intact. No shift in midline structures. Basilar and perimesencephalic cisterns are preserved.     1. Stable patchy areas of decreased attenuation at the  gray-white matter junctions in both cerebral hemispheres and cerebellar hemispheres when compared with the prior MRI of the brain performed on 3/28/2024. 2. No hemorrhagic transformation. 3. No other acute findings.     EC-ECHOCARDIOGRAM COMPLETE W/O CONT    Result Date: 3/29/2024  Transthoracic Echo Report Echocardiography Laboratory CONCLUSIONS Hyperdynamic left ventricular systolic function. Visually estimated ejection fraction is 70-75 %. The right ventricle is mildly dilated. Normal right ventricular systolic function. 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 Mild mitral stenosis. Mean transvalvular gradient is 4 mmHg at a heart rate of 72 BPM. Mild mitral regurgitation. 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 Right ventricular systolic pressure is estimated to be 34 mmHg (normal). Compared to the prior study on 10/23/2023, mass on mitral valve is more clearly visualized in this study.  Correlate clinically Primary team is notified of findings. SHAILESH BYRD Exam Date:         2024                    10:21 Exam Location:     Inpatient Priority:          Stat Ordering Physician:        LINK CHURCHILL Referring Physician:       [id number] Sonographer:               Lexie Salmeron [facility] Age:    88     Gender:    F MRN:    [id number] :    [id number] BSA:    1.75   Ht (in):    62     Wt (lb):    163 Exam Type:     Complete Indications:     Endocarditis ICD Codes:       421 CPT Codes:       85876 BP:   145    /   78     HR:   72 Technical Quality:       Good MEASUREMENTS  (Male / Female) Normal Values 2D ECHO LV Diastolic Diameter PLAX        4.9 cm                4.2 - 5.9 / 3.9 - 5.3 cm LV Systolic Diameter PLAX         2.7 cm                2.1 - 4.0 cm IVS Diastolic Thickness           1.1 cm                LVPW Diastolic Thickness          1.3 cm                LVOT Diameter                     1.8 cm                 DOPPLER AV Peak Velocity                  2.4 m/s               AV Peak Gradient                  22.1 mmHg             AV Mean Gradient                  12 mmHg               LVOT Peak Velocity                1 m/s                 AV Area Cont Eq vti               1.4 cm2               MV Velocity Time Integral         39.8 cm               MV Pressure Half Time             77.4 ms               MV Area PHT                       2.8 cm2               TR Peak Velocity                  265 cm/s              PV Peak Velocity                  0.96 m/s              PV Peak Gradient                  3.7 mmHg              * Indicates values subject to auto-interpretation LV EF:        % FINDINGS Left Ventricle Normal left ventricular chamber size. Normal left ventricular wall thickness. Hyperdynamic left ventricular systolic function. Visually estimated ejection fraction is 70-75 %. No regional wall motion abnormalities. A reliable estimation of diastolic function cannot be made due to mitral valve disease. Right Ventricle The right ventricle is mildly dilated. Normal right ventricular systolic function. Right Atrium Normal right atrial size. Normal inferior vena cava size and inspiratory collapse. Left Atrium Moderately dilated left atrium. Left atrial volume index is 45 mL/sq m. Mitral Valve Mobile mass noted on mitral valve (posterior leaflet). Mild mitral stenosis. Mean transvalvular gradient is 4 mmHg at a heart rate of 72 BPM. Mild mitral regurgitation. Aortic Valve Known TAVR aortic valve  that is functioning normally with normal transvalvular gradients. Transvalvular gradients are - Peak: 25 mmHg,  Mean: 14 mmHg. No evidence of paravalvular leak. Tricuspid Valve Structurally normal tricuspid valve. No tricuspid stenosis. Trace tricuspid regurgitation. Right atrial pressure is estimated to be 3 mmHg. Right ventricular systolic pressure is estimated to be 34 mmHg. Pulmonic Valve The pulmonic valve is not well  visualized. No pulmonic stenosis. Mild pulmonic insufficiency. Pericardium No pericardial effusion. Aorta The ascending aorta diameter is 3.3 cm. Iman Zhong MD (Electronically Signed) Final Date:     29 March 2024                 11:55    MR-BRAIN-WITH & W/O    Result Date: 3/29/2024  3/28/2024 3:46 PM HISTORY/REASON FOR EXAM: questinable seizure Altered level of consciousness TECHNIQUE/EXAM DESCRIPTION: T1 sagittal, T2 axial, flair coronal, T1 coronal, and diffusion-weighted axial images were obtained of the brain pre-contrast followed by T1 coronal and axial images post intravenous administration of 15 mL ProHance. COMPARISON: CT brain dated 3/26/2024 FINDINGS: Multiple small foci of acute infarction are noted in the bilateral cerebellum, brainstem involving the left chris, the right midbrain posteriorly, bilateral external capsular regions, right insular region, right frontal region, left medial frontal region,  bilateral parietal cortex and subcortical region, left corpus callosum and bilateral high parietal and frontal convexity cortex and subcortical region. Intracranial flow voids are normal. Gradient-echo images do not demonstrate any evidence of hemorrhage. Postcontrast images do not demonstrate any abnormal intracranial enhancement. Age-related volume loss noted with prominent sulci, cisterns and ventricles. Ventricular dilatation is proportionate to the degree of cerebral volume loss. There is no abnormal intracranial enhancement. The Sella is within normal limits. The craniocervical junction is within normal limits. Visualized intracranial arterial flow voids are within normal limits. Bone marrow signal in the calvarium is within normal limits. Included portions of the paranasal sinuses are within normal limits. Included portions of the mastoid air cells are within normal limits. Included portions of the orbits are within normal limits     Multiple foci of acute infarction scattered throughout the  bilateral cerebral hemispheres, cerebellum and brainstem as described above, suggesting a proximal cardiovascular embolic source. The largest cluster of acute infarctions is noted in the right frontoparietal region involving the right MCA territory. Age-related volume loss.    CT-CTA NECK WITH & W/O-POST PROCESSING    Result Date: 3/26/2024  3/26/2024 4:35 PM HISTORY/REASON FOR EXAM:  code stroke LEFT-sided weakness, altered mental status. TECHNIQUE/EXAM DESCRIPTION: CT angiogram of the neck with contrast. Postcontrast images were obtained of the neck from the great vessels through the skull base following the power injection of nonionic contrast at 5.0 mL/sec. Thin-section helical images were obtained with overlapping reconstruction interval. Coronal and oblique multiplanar volume reformats were generated. Cervical internal carotid artery percent stenosis is calculated using the standard method according to the NASCET criteria wherein a segment of uniform caliber mid or distal cervical internal carotid is used as the reference denominator. 3D angiographic images were reviewed on PACS.  Maximum intensity projection (MIP) images were generated and reviewed 80 mL of Omnipaque 350 nonionic contrast was injected intravenously. Low dose optimization technique was utilized for this CT exam including automated exposure control and adjustment of the mA and/or kV according to patient size. COMPARISON:  None. FINDINGS: Aortic arch: conventional branching pattern. There is atherosclerotic plaque of the aorta. Right common carotid artery: Patent Right internal carotid artery: Atherosclerotic plaque without significant stenosis (less than 50%). Left common carotid artery is patent. Left internal carotid artery: Atherosclerotic plaque without significant stenosis (less than 50%). The right vertebral artery is patent without dissection or stenosis. The left vertebral artery is patent without dissection or stenosis.  LEFT dominant  vertebral artery. Vertebrobasilar confluence: The vertebrobasilar confluence appears normal. Lung apices are clear The soft tissues of the neck are within normal limits. Multilevel degenerative changes cervical spine. 3D angiographic/MIP images of the vasculature confirm the vascular findings as described above.     No focal high-grade stenosis, dissection or occlusion of the cervical carotid or vertebral arteries.    CT-CTA HEAD WITH & W/O-POST PROCESS    Result Date: 3/26/2024  3/26/2024 4:35 PM HISTORY/REASON FOR EXAM:  code stroke.  LEFT-sided weakness, altered mental status. TECHNIQUE/EXAM DESCRIPTION: CT angiogram of the [place] without and with contrast.  Initial precontrast images were obtained of the head from the skull base through the vertex.  Postcontrast images were obtained of the [place] following the power injection of nonionic contrast at 5.0 mL/sec. Thin-section helical images were obtained with overlapping reconstruction interval. Coronal and sagittal multiplanar volume reformats were generated.  3D angiographic images were reviewed on PACS.  Maximum intensity projection (MIP) images were generated and reviewed. 80 mL of Omnipaque 350 nonionic contrast was injected intravenously. Low dose optimization technique was utilized for this CT exam including automated exposure control and adjustment of the mA and/or kV according to patient size. COMPARISON:  CT head 3/26/2024 FINDINGS: The posterior circulation shows the distal vertebral arteries to be patent.  LEFT dominant vertebral artery.  The vertebrobasilar confluence is intact. The basilar artery is patent. No aneurysm or occlusive lesion is evident. Anterior cerebral arteries are normal in caliber and patent.  LEFT middle cerebral artery is patent.  Potential focal narrowing or nonocclusive clot in superior division RIGHT sylvian vessel, M3 segment. 3D angiographic/MIP images of the vasculature confirm the vascular findings as  described above.     1.  Short segment decreased enhancement in the RIGHT superior sylvian division of middle superior artery, M3 segment, focal stenosis versus nonocclusive clot. 2.  No intracranial aneurysm or abrupt large vessel cut off.    CT-HEAD W/O    Result Date: 3/26/2024  3/26/2024 4:40 PM HISTORY/REASON FOR EXAM:  Concern for stroke. TECHNIQUE/EXAM DESCRIPTION AND NUMBER OF VIEWS: CT of the head without contrast. The study was performed on a helical multidetector CT scanner. Contiguous axial sections were obtained from the skull base through the vertex. Up to date radiation dose reduction adjustments have been utilized to meet ALARA standards for radiation dose reduction. COMPARISON:  Noncontrast head CT performed one day prior FINDINGS: Few small scattered parenchymal calcifications suggesting sequela of previous infection or inflammation. Chronic appearing bilateral basal ganglia lacunar infarcts. Hypoattenuating foci in the bilateral cerebellum which are indeterminant. They could be subacute infarcts versus other. Recommend brain MRI workup. Redemonstration of a small focus of low-attenuation in the left parietal deep and subcortical white matter. Possibly gliosis versus subacute infarct. Moderate nonspecific white matter changes. No intracranial mass effect, midline shift, hydrocephalus, hemorrhage. Bilateral maxillary sinus mucosal thickening. Left maxillary sinus mucosal retention cyst. Mastoids in the field of view are unremarkable.     1.  Hypoattenuating foci in the bilateral cerebellum which are indeterminant. They could be subacute infarcts versus other. Recommend brain MRI workup. 2.  Redemonstration of a small focus of low-attenuation in the left parietal deep and subcortical white matter. Possibly gliosis versus subacute infarct. 3.  Findings are stable since the noncontrast head CT performed one day prior.     CT-HEAD W/O    Result Date: 3/25/2024  3/25/2024 4:13 PM HISTORY/REASON FOR EXAM:   ALTERED LEVEL OF CONSCIOUSNESS. Patient found down. TECHNIQUE/EXAM DESCRIPTION AND NUMBER OF VIEWS: CT of the head without contrast. The study was performed on a GE CT scanner. Contiguous 5 mm axial sections were obtained from the skull base through the vertex. Up to date radiation dose reduction adjustments have been utilized to meet ALARA standards for radiation dose reduction. COMPARISON:  None FINDINGS: Small, rounded hypodense areas of the bilateral cerebellar hemispheres. There is a small area of loss of gray-white differentiation in the left parietal lobe. The calvariae and skull base are unremarkable. There are no extraaxial fluid collections. There is a pattern of cerebral atrophy manifest as enlargement of sulcal markings and ventricular prominence.  The ventricular system and basal cisterns are otherwise unremarkable. There are areas of hypodensity in the white matter most consistent with small vessel ischemic change versus demyelination or gliosis. There are no hemorrhagic lesions. There are no mass effects or shift of midline structures. The paranasal sinuses and mastoids in the field of view are unremarkable.     1.  Small, rounded hypodense areas of the bilateral cerebellar hemispheres. These may represent age-indeterminate infarcts. Less likely this could represent edema related to metastatic disease. 2.  There is a small area of loss of gray-white differentiation in the left parietal lobe, possibly an age-indeterminate infarct. 3.  These findings can be further evaluated with contrast-enhanced MRI as indicated. 4.  Cerebral atrophy. 5.  White matter lucencies most consistent with    DX-CHEST-PORTABLE (1 VIEW)    Result Date: 3/25/2024  3/25/2024 2:54 PM HISTORY/REASON FOR EXAM:  Sepsis; sepsis. Altered level of consciousness. TECHNIQUE/EXAM DESCRIPTION AND NUMBER OF VIEWS: Single portable view of the chest. COMPARISON: Chest radiograph, 3/20/2024. FINDINGS: Lungs: No focal opacities were evident.  No pleural effusion or visible pneumothorax. Mediastinum: The descending colon post surgical changes of TAVR. Aortic annular calcifications. Calcified atherosclerotic plaques in aorta. Other: Stable age-related degenerative changes in the spine and shoulders and diffuse decreased bone mineralization.     No acute findings.    DX-CHEST-PORTABLE (1 VIEW)    Result Date: 3/20/2024  3/20/2024 10:49 AM HISTORY/REASON FOR EXAM:  hypoxia TECHNIQUE/EXAM DESCRIPTION AND NUMBER OF VIEWS: Single portable view of the chest. COMPARISON: 9/26/2023 FINDINGS: There is TAVR hardware. HEART: Stable size. There is atherosclerotic calcification in the aortic arch. LUNGS: Lung volumes are low. There is faint LEFT basilar opacity similar to prior. PLEURA: No effusion or pneumothorax.     1.  Persistently enlarged cardiac silhouette 2.  LEFT basilar opacity likely atelectasis rather than pneumonia      Micro:  Results       Procedure Component Value Units Date/Time    BLOOD CULTURE [[id number]] Collected: 03/27/24 0428    Order Status: Completed Specimen: Blood from Peripheral Updated: 04/01/24 0500     Significant Indicator NEG     Source BLD     Site PERIPHERAL     Culture Result No growth after 5 days of incubation.    Narrative:      Left    BLOOD CULTURE [[id number]] Collected: 03/27/24 0409    Order Status: Completed Specimen: Blood from Peripheral Updated: 04/01/24 0500     Significant Indicator NEG     Source BLD     Site PERIPHERAL     Culture Result No growth after 5 days of incubation.    Narrative:      Left Hand    Blood Culture - Draw one from central line and one from peripheral site [[id number]]  (Abnormal) Collected: 03/25/24 1425    Order Status: Completed Specimen: Blood from Peripheral Updated: 03/30/24 1026     Significant Indicator POS     Source BLD     Site PERIPHERAL     Culture Result Growth detected by Bactec instrument. 03/26/2024  12:56      Streptococcus gordonii  Please see newest culture for susceptibilities       Narrative:      CALL  Cramer  61 tel. [phone],  CALLED  61 tel. [phone] 03/26/2024, 17:21, RB PERF. RESULTS CALLED TO:  Voalted to Blood Culture Pharmacy, no ID  No site indicated    E-Test [[id number]] Collected: 03/25/24 1540    Order Status: Completed Specimen: Other Updated: 03/30/24 1024     ETEST Sensitivity FINAL    Narrative:      THAD tel. [phone] 03/28/2024, 05:22, RB PERF. RESULTS CALLED TO: RN [id number]    Blood Culture - Draw one from central line and one from peripheral site [[id number]]  (Abnormal)  (Susceptibility) Collected: 03/25/24 1540    Order Status: Completed Specimen: Blood from Line Updated: 03/30/24 1023     Significant Indicator POS     Source BLD     Site Peripheral     Culture Result Growth detected by Bactec instrument. 03/28/2024  05:17      Streptococcus gordonii    Narrative:      CALL  Cramer  THAD tel. [phone],  CALLED  THAD tel. [phone] 03/28/2024, 05:22, RB PERF. RESULTS CALLED TO: RN  [id number]  Left AC    Susceptibility       Streptococcus gordonii (1)       Antibiotic Interpretation Microscan   Method Status    Penicillin Sensitive 0.032 mcg/mL E-TEST Final    Cefotaxime Sensitive 0.023 mcg/mL E-TEST Final                               Assessment/Hospital course:  This is an 88-year-old female patient who presented after a fall for somnolence, altered mental status.  She has known protein S deficiency on Xarelto, hypertension, hyperlipidemia.  MRI of the brain with multiple acute infarcts, blood cultures x 2 positive for Strep gordonii, status post TAVR.  TTE with echodensity on the posterior leaflet of the native mitral valve, EKG with second-degree AV block concerning for perivalvular abscess.    Patient had a recent dental procedure about a month ago and has a temporary crown in place.    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  We will follow along.  Of note, according to patient's son, she does not have a well adhered crown in place, just a temporary one so please make sure this is communicated with the proceduralists  -CT maxillofacial with no evidence of dental abscesses  -Recommend MRI of the whole spine when able and if in line with goals of care, ordered and pending    Disposition: Anticipate at least 6 weeks of IV antibiotics at a facility.  Not cleared for discharge at this time  Need for PICC line: Eventually yes, currently not cleared    Discussed with Dr. Cruz.  This illness poses threat to life.  ID will follow

## 2024-04-04 NOTE — PROGRESS NOTES
Hospital Medicine Daily Progress Note    Date of Service  4/4/2024    Chief Complaint  Marry Mathur is a 88 y.o. female admitted 3/25/2024 with altered mental status    Hospital Course  88 y.o. female who presented 3/25/2024 with altered mental status.  Per EMS, neighbors checked on patient and found her on the ground, unclear of how long she was actually down on the ground.  She was then brought to ER for evaluation.  Patient found to have UTI, rhabdomyolysis, DASHA. She was admitted for treatment of metabolic encephalopathy. Patient had acute lethargy requiring rapid response 3/26, code stroke activated. She had equivocal EEG and CT head, but MRI brain showed numerous embolic infarcts across bilateral hemispheres concerning for cardioembolic stroke. Blood cultures positive for streptococcus gordonii, urine culture with E coli. Cardiology and ID consulted who recommend VINAY. Repeat blood cultures so far negative. Plan for VINAY when patient is more alert.       Interval Problem Update  Patient was seen and examined at bedside.  No acute events overnight. Patient is resting comfortably in bed and in no acute distress. Son updated at bedside.     Not safe to proceed with VINAY at this time, reevaluate daily  MRI spine completed and reviewed   CT maxillofacial reviewed  Tube feeds  ID recs  Continue IV penicillin    I have discussed this patient's plan of care and discharge plan at IDT rounds today with Case Management, Nursing, Nursing leadership, and other members of the IDT team.    Consultants/Specialty  General Neurology   Vascular Neurology    Code Status  DNAR/DNI    Disposition  The patient is not medically cleared for discharge to home or a post-acute facility.    Patient son prefers that patient go to SNF in [place] when clear for discharge    I have placed the appropriate orders for post-discharge needs.    Review of Systems  Review of Systems   Unable to perform ROS: Acuity of condition   All other systems  reviewed and are negative.       Physical Exam  Temp:  [36.4 °C (97.5 °F)-37.2 °C (99 °F)] 36.6 °C (97.9 °F)  Pulse:  [73-83] 83  Resp:  [20-28] 22  BP: (131-153)/(68-84) 149/79  SpO2:  [92 %-94 %] 93 %    Physical Exam  Vitals and nursing note reviewed.   Constitutional:       General: She is not in acute distress.     Appearance: She is ill-appearing and toxic-appearing.   HENT:      Head: Normocephalic and atraumatic.      Right Ear: External ear normal.      Left Ear: External ear normal.      Mouth/Throat:      Pharynx: Oropharynx is clear.   Eyes:      General: No scleral icterus.     Conjunctiva/sclera: Conjunctivae normal.   Cardiovascular:      Rate and Rhythm: Normal rate and regular rhythm.      Pulses: Normal pulses.   Pulmonary:      Effort: Pulmonary effort is normal.      Breath sounds: Normal breath sounds. No wheezing.   Abdominal:      General: Abdomen is flat.      Palpations: Abdomen is soft.      Tenderness: There is no abdominal tenderness. There is no guarding or rebound.   Musculoskeletal:         General: No deformity. Normal range of motion.      Cervical back: Neck supple.   Skin:     General: Skin is warm and dry.      Coloration: Skin is not jaundiced.      Findings: No bruising.   Neurological:      General: No focal deficit present.      Mental Status: She is alert.      Cranial Nerves: No cranial nerve deficit.      Motor: Weakness present.      Comments: A&O x1 (hospital)  Responds to commands  More alert today  Moves extremities when instructed   Psychiatric:         Mood and Affect: Mood is anxious.      Comments: Unable to assess         Fluids    Intake/Output Summary (Last 24 hours) at 4/4/2024 1524  Last data filed at 4/4/2024 1133  Gross per 24 hour   Intake 90 ml   Output 3210 ml   Net -3120 ml           Laboratory  Recent Labs     04/03/24  0119 04/04/24  0108   WBC 6.7 7.4   RBC 3.82* 3.93*   HEMOGLOBIN 11.0* 11.6*   HEMATOCRIT 33.0* 33.8*   MCV 86.4 86.0   MCH 28.8 29.5    MCHC 33.3 34.3   RDW 43.7 43.2   PLATELETCT 203 214   MPV 10.1 10.1     Recent Labs     04/03/24  0119 04/04/24  0108   SODIUM 130* 130*   POTASSIUM 3.4* 3.9   CHLORIDE 100 96   CO2 20 21   GLUCOSE 161* 143*   BUN 10 12   CREATININE 0.38* 0.44*   CALCIUM 7.2* 8.1*                     Imaging  MR-THORACIC SPINE-WITH & W/O   Final Result      1.  Dorsal kyphosis.   2.  Degenerative changes as described above.   3.  No evidence of discitis, osteomyelitis, or epidural abscess.      MR-CERVICAL SPINE-WITH & W/O   Final Result      1.  Multilevel degenerative changes most notable for mild central stenosis at C5-C6. Details for each level above in the body of report.   2.  Concerning the history of bacteremia, no evidence of discitis, osteomyelitis, epidural abscess, or epidural phlegmon.   3.  No myelopathic cord signal abnormality.      MR-LUMBAR SPINE-WITH & W/O   Final Result      1.  Postoperative changes and chronic degenerative changes as described above, most notable for L2-3 mild-moderate central stenosis. Details for each level above in the body of report.   2.  No evidence of discitis, osteomyelitis, or epidural abscess.   3.  Induration/thickening of the presacral soft tissues. Posterior pelvic inflammatory process/phlegmon would not be excluded. CT of the abdomen and pelvis with contrast may be of interest.      DX-ABDOMEN FOR TUBE PLACEMENT   Final Result         1.  Hazy left lower lobe infiltrates   2.  Small left pleural effusion   3.  Nasogastric tube tip terminates overlying the expected location of the gastric body.   4.  Cardiomegaly   5.  Atherosclerotic      CT-MAXILLOFACIAL WITH PLUS RECONS   Final Result      1.  Mild mucosal thickening noted in paranasal sinuses. No air-fluid levels.      2.  No soft tissue fluid collection or abscess identified.      3.  No bone erosion or bone destruction is identified.      CT-HEAD W/O   Final Result      1. Stable patchy areas of decreased attenuation at the  gray-white matter junctions in both cerebral hemispheres and cerebellar hemispheres when compared with the prior MRI of the brain performed on 3/28/2024.   2. No hemorrhagic transformation.   3. No other acute findings.         EC-ECHOCARDIOGRAM COMPLETE W/O CONT   Final Result      MR-BRAIN-WITH & W/O   Final Result         Multiple foci of acute infarction scattered throughout the bilateral cerebral hemispheres, cerebellum and brainstem as described above, suggesting a proximal cardiovascular embolic source.      The largest cluster of acute infarctions is noted in the right frontoparietal region involving the right MCA territory.      Age-related volume loss.      CT-CTA NECK WITH & W/O-POST PROCESSING   Final Result      No focal high-grade stenosis, dissection or occlusion of the cervical carotid or vertebral arteries.      CT-CTA HEAD WITH & W/O-POST PROCESS   Final Result      1.  Short segment decreased enhancement in the RIGHT superior sylvian division of middle superior artery, M3 segment, focal stenosis versus nonocclusive clot.   2.  No intracranial aneurysm or abrupt large vessel cut off.      CT-HEAD W/O   Final Result      1.  Hypoattenuating foci in the bilateral cerebellum which are indeterminant. They could be subacute infarcts versus other. Recommend brain MRI workup.   2.  Redemonstration of a small focus of low-attenuation in the left parietal deep and subcortical white matter. Possibly gliosis versus subacute infarct.   3.  Findings are stable since the noncontrast head CT performed one day prior.         CT-HEAD W/O   Final Result      1.  Small, rounded hypodense areas of the bilateral cerebellar hemispheres. These may represent age-indeterminate infarcts. Less likely this could represent edema related to metastatic disease.   2.  There is a small area of loss of gray-white differentiation in the left parietal lobe, possibly an age-indeterminate infarct.   3.  These findings can be further  evaluated with contrast-enhanced MRI as indicated.   4.  Cerebral atrophy.   5.  White matter lucencies most consistent with      DX-CHEST-PORTABLE (1 VIEW)   Final Result      No acute findings.      EC-VINAY W/O CONT    (Results Pending)        Assessment/Plan  * Bacteremia due to Streptococcus  Assessment & Plan  Blood cultures 3/25 with strep gordonii  Repeat blood cultures 3/27 taken  Urine culture positive for E coli  Echo shows 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, cardiology consulted  VINAY when patient stable for procedure  MRI spine reviewed - no obvious abscess or osteomyelitis identified  Continue IV penicillin    Stroke (cerebrum) (HCC)  Assessment & Plan  MRI brain confirms multiple foci of acute infarction bilaterally concerning for cardioembolic strokes  Possible septic emboli from endocarditis  Patient with hx of protein S deficiency and DVT, on anticoagulation at home, currently AC is held  Neurology following      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, based on echo showing 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 in setting of positive blood cultures  MRI brain with multiple foci of acute infarcts concerning for cardioembolic stroke  On ceftriaxone  Following blood cultures  ID, neurology, cardiology following  Echo shows 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, cardiology consulted  VINAY when patient stable for procedure    Acute metabolic encephalopathy- (present on admission)  Assessment & Plan  Patient presents with confusion.  UA pertinent for UTI, likely culprit for encephalopathy.  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, cardiology consulted  VINAY when  patient stable for procedure    Moderate protein-calorie malnutrition (HCC)  Assessment & Plan  Very poor oral intake  Tube feeds    Acute cystitis without hematuria  Assessment & Plan  Continue ancef  Urine culture with E coli    S/P TAVR (transcatheter aortic valve replacement)- (present on admission)  Assessment & Plan  Performed in September 2023    Protein S deficiency (HCC)- (present on admission)  Assessment & Plan  Holding home xarelto    History of recurrent deep vein thrombosis (DVT)- (present on admission)  Assessment & Plan  On Xarelto at home  Hold AC for now given strokes    ACP (advance care planning)  Assessment & Plan  Full code per my colleague on admission    I addressed goals of care and plan of care with patients son (Nando) at bedside. Patient is unable to answer orientation questioning appropriately. Per patients son, patient was very independent prior to admission. Patient has no family members who reside in [place]. Patient is being treated for bacteremia, endocarditis. Patient has had very poor oral intake and tube feeds were started 04/04. Nando is hopeful that patient will improve and be able to discharge to SNF in [place], [place]. We will see how patient improves with tube feeds and adequate nutrition. Total of 21 minutes spent in discussion and coordination of advance care planning.     Other hyperlipidemia- (present on admission)  Assessment & Plan  Lipitor    Hypertension  Assessment & Plan  Restart         VTE prophylaxis: SCDs    Greater than 51 minutes spent prepping to see patient (e.g. review of tests) obtaining and/or reviewing separately obtained history. Performing a medically appropriate examination and/ evaluation.  Counseling and educating the patient/family/caregiver.  Ordering medications, tests, or procedures.  Referring and communicating with other health care professionals.  Documenting clinical information in EPIC.  Independently interpreting results and communicating  results to patient/family/caregiver.  Care coordination.

## 2024-04-04 NOTE — THERAPY
Speech Language Therapy Contact Note    Patient Name: Marry Mathur  Age:  88 y.o., Sex:  female  Medical Record #: [id number]  Today's Date: 4/4/2024 04/03/24 0824   Treatment Variance   Reason For Missed Therapy Non-Medical - Other (Please Comment)   Initial Contact Note    Initial Contact Note  Order Received and Verified, Speech Therapy Evaluation in Progress with Full Report to Follow.   Interdisciplinary Plan of Care Collaboration   IDT Collaboration with  Nursing   Collaboration Comments Order received for a cognitive evaluation. Attempted to complete; however, Rn reported that the physician cancelled the order. Pt would likely benefit from a cognitive evaluation once mentation improves.

## 2024-04-04 NOTE — CARE PLAN
The patient is Stable - Low risk of patient condition declining or worsening    Shift Goals  Clinical Goals: neuro exam  Patient Goals: wilmer  Family Goals: updates    Progress made toward(s) clinical / shift goals:    Problem: Skin Integrity  Goal: Skin integrity is maintained or improved  Outcome: Progressing     Problem: Neuro Status  Goal: Neuro status will remain stable or improve  Outcome: Progressing       Patient is not progressing towards the following goals:

## 2024-04-04 NOTE — PROGRESS NOTES
Monitor Summary: SR 74-80, NE 0.19, QRS 0.12, QT 0.40, with rare PACs/PVCs per strip from monitor room.

## 2024-04-04 NOTE — PROGRESS NOTES
Monitor Summary: SR 69-80, OH 0.18, QRS 0.12, QT 0.45, with rare, frequent and occasional PACs/PVCs per strip from monitor room.

## 2024-04-05 PROCEDURE — 700102 HCHG RX REV CODE 250 W/ 637 OVERRIDE(OP): Performed by: INTERNAL MEDICINE

## 2024-04-05 PROCEDURE — 99233 SBSQ HOSP IP/OBS HIGH 50: CPT | Performed by: INTERNAL MEDICINE

## 2024-04-05 PROCEDURE — 92526 ORAL FUNCTION THERAPY: CPT

## 2024-04-05 PROCEDURE — A9270 NON-COVERED ITEM OR SERVICE: HCPCS | Performed by: INTERNAL MEDICINE

## 2024-04-05 PROCEDURE — 700105 HCHG RX REV CODE 258

## 2024-04-05 PROCEDURE — 770020 HCHG ROOM/CARE - TELE (206)

## 2024-04-05 PROCEDURE — 51798 US URINE CAPACITY MEASURE: CPT

## 2024-04-05 PROCEDURE — 99233 SBSQ HOSP IP/OBS HIGH 50: CPT | Mod: 25 | Performed by: INTERNAL MEDICINE

## 2024-04-05 PROCEDURE — 700111 HCHG RX REV CODE 636 W/ 250 OVERRIDE (IP)

## 2024-04-05 PROCEDURE — 99497 ADVNCD CARE PLAN 30 MIN: CPT | Performed by: INTERNAL MEDICINE

## 2024-04-05 RX ADMIN — SODIUM CHLORIDE 3 MILLION UNITS: 9 INJECTION, SOLUTION INTRAVENOUS at 06:05

## 2024-04-05 RX ADMIN — SODIUM CHLORIDE 3 MILLION UNITS: 9 INJECTION, SOLUTION INTRAVENOUS at 10:11

## 2024-04-05 RX ADMIN — SODIUM CHLORIDE 3 MILLION UNITS: 9 INJECTION, SOLUTION INTRAVENOUS at 15:42

## 2024-04-05 RX ADMIN — LOSARTAN POTASSIUM 50 MG: 50 TABLET, FILM COATED ORAL at 06:05

## 2024-04-05 RX ADMIN — AMLODIPINE BESYLATE 2.5 MG: 2.5 TABLET ORAL at 18:48

## 2024-04-05 RX ADMIN — SODIUM CHLORIDE 3 MILLION UNITS: 9 INJECTION, SOLUTION INTRAVENOUS at 18:53

## 2024-04-05 RX ADMIN — SODIUM CHLORIDE 3 MILLION UNITS: 9 INJECTION, SOLUTION INTRAVENOUS at 02:25

## 2024-04-05 RX ADMIN — SODIUM CHLORIDE 3 MILLION UNITS: 9 INJECTION, SOLUTION INTRAVENOUS at 22:44

## 2024-04-05 RX ADMIN — ATORVASTATIN CALCIUM 20 MG: 20 TABLET, FILM COATED ORAL at 18:47

## 2024-04-05 ASSESSMENT — PAIN DESCRIPTION - PAIN TYPE
TYPE: ACUTE PAIN
TYPE: ACUTE PAIN

## 2024-04-05 ASSESSMENT — FIBROSIS 4 INDEX: FIB4 SCORE: 2.71

## 2024-04-05 NOTE — PROGRESS NOTES
Infectious Disease Progress Note    Author: Carlotta Guidry M.D. Date & Time of service: 2024  9:13 AM    Chief Complaint:  Follow-up for bacteremia     Interval History:  3/30 Tmax 99.3, white count down to 11.5, tolerated switch to ceftriaxone, cultures as below, creatinine 0.39, mildly elevated transaminases,   3/31 patient is afebrile, white count down to 9.2, tolerated switch to penicillin, creatinine 0.43, mildly elevated transaminases stable, cultures as below   patient remains afebrile, count is 7.1, tolerated switch to penicillin, creatinine 0.42, mildly elevated transaminases improving now, repeat blood cultures no growth till date  4/3 patient remains afebrile, count 6.7, tolerating penicillin, remains somnolent overall, creatinine 0.38, prealbumin 5, cultures as below   Tmax 99, white count 7.4, cultures remain no growth to date, creatinine is 0.44, magnesium 1.6, going down to MRI    Review of Systems:  Review of Systems   Unable to perform ROS: Mental status change       Hemodynamics:  Temp (24hrs), Av.7 °C (98.1 °F), Min:36.5 °C (97.7 °F), Max:37.1 °C (98.8 °F)  Temperature: 36.7 °C (98.1 °F)  Pulse  Av.5  Min: 48  Max: 97   Blood Pressure : 136/58       Physical Exam:  Physical Exam  Cardiovascular:      Rate and Rhythm: Normal rate.      Heart sounds: Murmur heard.   Pulmonary:      Effort: Pulmonary effort is normal.      Breath sounds: Normal breath sounds.   Abdominal:      General: Abdomen is flat. Bowel sounds are normal.      Palpations: Abdomen is soft.   Skin:     General: Skin is warm and dry.   Neurological:      Comments: Somnolent, oriented x 1 per hospitalist   Psychiatric:         Mood and Affect: Mood normal.         Meds:    Current Facility-Administered Medications:     acetaminophen    amLODIPine    atorvastatin    losartan    Pharmacy    penicillin g    [Held by provider] rivaroxaban    Labs:  Recent Labs     24  0119 24  0108   WBC 6.7  7.4   RBC 3.82* 3.93*   HEMOGLOBIN 11.0* 11.6*   HEMATOCRIT 33.0* 33.8*   MCV 86.4 86.0   MCH 28.8 29.5   RDW 43.7 43.2   PLATELETCT 203 214   MPV 10.1 10.1   NEUTSPOLYS 74.30* 76.10*   LYMPHOCYTES 9.50* 10.10*   MONOCYTES 14.00* 11.10   EOSINOPHILS 1.40 1.80   BASOPHILS 0.50 0.40     Recent Labs     04/03/24  0119 04/04/24  0108   SODIUM 130* 130*   POTASSIUM 3.4* 3.9   CHLORIDE 100 96   CO2 20 21   GLUCOSE 161* 143*   BUN 10 12     Recent Labs     04/03/24  0119 04/04/24  0108   ALBUMIN 2.4* 2.6*   CREATININE 0.38* 0.44*       Imaging:  MR-LUMBAR SPINE-WITH & W/O    Result Date: 4/4/2024 4/4/2024 8:56 AM HISTORY/REASON FOR EXAM:  bacteremia. TECHNIQUE/EXAM DESCRIPTION: MRI of the lumbar spine without and with contrast. The study was performed on a [employer] 1.5 Jyoti MRI scanner. T1 sagittal, T2 fast spin-echo sagittal, and T2 axial images were obtained of the lumbar spine. T1 post-contrast fat-suppressed sagittal images were obtained. Optional T2 fat-suppressed sagittal and T1 post-contrast axial images may be obtained. 18 mL ProHance gadolinium contrast was administered intravenously. COMPARISON:  MRI lumbar spine 9/26/2022 FINDINGS: Alignment in the lumbar spine shows grade 1-2 spondylolisthesis of L4 on L5. Degenerative retrolisthesis T12-L1. Mild degenerative anterolisthesis L3-L4. Again noted is postoperative change with lumbar laminectomy at L3-4 and L4-5. Interbody fusion at L4-5 with a disc space insert. Posterior fusion with transpedicular screw fixation at L3-L4-L5. Expected metallic artifact. The posterior paraspinous soft tissues show expected postoperative changes with atrophy and fatty replacement in the posterior paraspinous muscles at the operative levels. There is no significant postoperative dorsal epidural fluid collection. The prevertebral soft tissues including the psoas muscles show mild symmetric psoas atrophy, age expected. The conus is normal in position and signal with its tip at the  L2 level. There is no abnormal cord or conus enhancement. There is no abnormal intradural extra medullary enhancement. No evidence of epidural phlegmon or epidural abscess. There is mild degenerative disc space narrowing at L3-4. Advanced disc space narrowing at L5-S1. T12-L1 moderate broad-based right paramedian and right lateral disc bulging. Moderate right lateral recess stenosis. Thecal sac toward the lower range of normal without christin central stenosis. Marked right foraminal stenosis. Moderate left foraminal stenosis. At L1-2, diffuse disc bulging asymmetrically greater left paramedian. Marked left lateral recess stenosis (T2 axial image 30, series 9). Mild hypertrophic facet arthropathy. No christin central stenosis. Moderate bilateral foraminal stenosis. L2-3 mild diffuse disc bulging. Ligamentum flavum hypertrophy. Metallic artifact obscures detail but there appears to be mild to moderate central stenosis. Moderate bilateral foraminal stenosis. At L3-4, mild diffuse disc bulging. Generous thecal sac postlaminectomy. Nerve roots of the cauda equina show no clumping or evidence of arachnoiditis. Minimal impression on the ventral perineural fat due to disc bulging but no significant foraminal stenosis at this level. L4-5 mild pseudo-disc bulging. No central stenosis post laminectomy. Mild bilateral foraminal stenosis with horizontal deformity due to the spondylolisthesis. L5-S1, minimal disc bulging. No central stenosis. The left neural foramen is intact. Mild right inferior foraminal stenosis. Incidentally noted, the presacral soft tissues appear thickened, indurated with T1 hypointensity and STIR hyperintensity (T1 sagittal image 12, series 3, STIR sagittal image 10, series 6). No marrow edema signal or enhancement in the adjacent sacrum or coccyx.     1.  Postoperative changes and chronic degenerative changes as described above, most notable for L2-3 mild-moderate central stenosis. Details for each level above  in the body of report. 2.  No evidence of discitis, osteomyelitis, or epidural abscess. 3.  Induration/thickening of the presacral soft tissues. Posterior pelvic inflammatory process/phlegmon would not be excluded. CT of the abdomen and pelvis with contrast may be of interest.    MR-THORACIC SPINE-WITH & W/O    Result Date: 4/4/2024 4/4/2024 8:56 AM HISTORY/REASON FOR EXAM:  bacteremia. TECHNIQUE/EXAM DESCRIPTION: MRI of the thoracic spine without and with contrast. The study was performed on a [employer] 1.5 Jyoti MRI scanner. T1 sagittal, T2 fast spin-echo sagittal, and T2 axial images were obtained of the thoracic spine. T1 post-contrast fat suppressed sagittal images were obtained. Optional T1 post-contrast axial images may be obtained. 18 mL ProHance gadolinium contrast was administered intravenously. COMPARISON:  MRI lumbar spine from today's date FINDINGS: Alignment in the thoracic spine shows moderate dorsal kyphosis. Slight anterolisthesis of T2 on T3. Marrow signal shows chronic fatty signal discogenic endplate marrow change in the midthoracic spine at T6-T7 and T8-T9 associated with anterior marginal spurring. There is T1 and T2 hyperintensity at the T6-T7 and T8-T9 disc spaces which are narrowed/diminutive. 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 There is no evidence of discitis, osteomyelitis, or epidural abscess. There is no abnormal osseous or disc space enhancement. The thoracic spinal cord is normal in caliber and signal throughout its course. At T7-T8, there is a tiny midline ventral extradural defect consistent with a disc bulge. Thecal sac remains generous with no central stenosis. The neural foramina are intact at all thoracic levels.     1.  Dorsal kyphosis. 2.  Degenerative changes as described above. 3.  No evidence of discitis, osteomyelitis, or epidural abscess.    MR-CERVICAL SPINE-WITH & W/O    Result Date: 4/4/2024 4/4/2024 8:52 AM  HISTORY/REASON FOR EXAM:  bacteremia. Found down. Rhabdomyolysis. TECHNIQUE/EXAM DESCRIPTION: MRI of the cervical spine without and with contrast. The study was performed on a [employer] 1.5 Jyoti MRI scanner. T1 sagittal, T2 fast spin-echo sagittal, and gradient echo axial images were obtained of the cervical spine. Optional T2 fat-suppressed sagittal images may also be obtained. T1 post-contrast fat suppressed sagittal images were obtained of the cervical spine. Optional T1 post-contrast axial images may be obtained. 18 mL ProHance gadolinium contrast was administered intravenously. COMPARISON: MRI cervical spine 2/16/2023 FINDINGS: Alignment in the cervical spine shows mild anterolisthesis of C4 on C5 and mild retrolisthesis of C5 on C6. Advanced disc space narrowing at C3-4 and C5-6. Mild disc space narrowing at C6-7. Variable marginal spurring. No abnormal disc space T2 hyperintensity or enhancement to suggest discitis. No marrow edema signal or abnormal osseous enhancement. The prevertebral and paraspinous soft tissues are unremarkable.. There are no anomalies at the craniovertebral junction.  The cervical spinal cord is normal in caliber and signal throughout its course. No abnormal cord enhancement. No abnormal epidural enhancement or intradural extra medullary enhancement. No evidence  of epidural abscess. At C2-3, no central stenosis. The right neural foramen is intact. Mild left foraminal stenosis. C3-4 minimal disc/osteophyte change. No central stenosis. Moderate bilateral foraminal stenosis. C4-5, no significant disc bulge or protrusion. No central stenosis. The neural foramina appear intact. C5-6 mild broad-based disc/osteophyte change. Mild central stenosis. Borderline bilateral foraminal stenosis. C6-C7, minimal disc/osteophyte change. No central stenosis. No significant foraminal stenosis. At C7-T1, no abnormality.     1.  Multilevel degenerative changes most notable for mild central stenosis at  C5-C6. Details for each level above in the body of report. 2.  Concerning the history of bacteremia, no evidence of discitis, osteomyelitis, epidural abscess, or epidural phlegmon. 3.  No myelopathic cord signal abnormality.    DX-ABDOMEN FOR TUBE PLACEMENT    Result Date: 4/2/2024 4/2/2024 9:25 PM HISTORY/REASON FOR EXAM: Nasogastric tube placement TECHNIQUE/EXAM DESCRIPTION: Single AP view of the abdomen COMPARISON:  August 29, 2017 FINDINGS: Hazy left lung base opacity is seen. Blunting of left costophrenic angle is seen Nasogastric tube is seen, the tip lies to the left of the lumbar spine.  The bowel gas pattern in the upper abdomen appears nonspecific. The bony structures appear age-appropriate.     1.  Hazy left lower lobe infiltrates 2.  Small left pleural effusion 3.  Nasogastric tube tip terminates overlying the expected location of the gastric body. 4.  Cardiomegaly 5.  Atherosclerotic    CT-MAXILLOFACIAL WITH PLUS RECONS    Result Date: 4/2/2024 4/2/2024 6:17 PM HISTORY/REASON FOR EXAM:  bacteremia, dental carries. TECHNIQUE/EXAM DESCRIPTION AND NUMBER OF VIEWS:  CT scan of the maxillofacial with contrast, with reconstructions. Thin-section helical imaging was obtained of the maxillofacial structures from the orbital roofs through the mandible. Coronal and sagittal multiplanar volume reformat images were generated from the axial data., 80 mL of Omnipaque 350 nonionic contrast was injected intravenously. Low dose optimization technique was utilized for this CT exam including automated exposure control and adjustment of the mA and/or kV according to patient size. COMPARISON:  None. FINDINGS: The bony orbits are intact.  The globes and extraocular muscles are normal in appearance. There is mild mucosal thickening in the ethmoid air cells and inferiorly in the left maxillary sinus. No air-fluid levels are identified. No bony erosion or bone destruction is identified. The facial bones and mandible show no  fractures.  The skull base shows no fractures. There are no abnormal fluid collections evident. The visualized brain and cervical spine in the field of view are unremarkable. There are no areas of abnormal contrast enhancement.     1.  Mild mucosal thickening noted in paranasal sinuses. No air-fluid levels. 2.  No soft tissue fluid collection or abscess identified. 3.  No bone erosion or bone destruction is identified.    CT-HEAD W/O    Result Date: 3/29/2024  3/29/2024 3:36 PM HISTORY/REASON FOR EXAM:  Acute change in mental status, MRI with embolic strokes. TECHNIQUE/EXAM DESCRIPTION AND NUMBER OF VIEWS: CT of the head without contrast. The study was performed on a helical multidetector CT scanner. Contiguous axial sections were obtained from the skull base through the vertex. Up to date radiation dose reduction adjustments have been utilized to meet ALARA standards for radiation dose reduction. COMPARISON:  MRI brain without and with IV contrast, 3/28/2024. FINDINGS: There is age-related central and cortical atrophy. Diffuse chronic migraines or pelvic white matter changes are evident. There are patchy areas of decreased attenuation at the gray-white matter junctions, compatible with known sites of emboli on the MRI of the brain without and with IV contrast. There is no hemorrhagic transformation. No extra-axial fluid collections. There are postsurgical changes in the globes. Paranasal sinuses, middle ear cavities and mastoid air cells are clear. Calvarium is intact. No shift in midline structures. Basilar and perimesencephalic cisterns are preserved.     1. Stable patchy areas of decreased attenuation at the gray-white matter junctions in both cerebral hemispheres and cerebellar hemispheres when compared with the prior MRI of the brain performed on 3/28/2024. 2. No hemorrhagic transformation. 3. No other acute findings.     EC-ECHOCARDIOGRAM COMPLETE W/O CONT    Result Date: 3/29/2024  Transthoracic Echo Report  Echocardiography Laboratory CONCLUSIONS Hyperdynamic left ventricular systolic function. Visually estimated ejection fraction is 70-75 %. The right ventricle is mildly dilated. Normal right ventricular systolic function. 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 Mild mitral stenosis. Mean transvalvular gradient is 4 mmHg at a heart rate of 72 BPM. Mild mitral regurgitation. 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 Right ventricular systolic pressure is estimated to be 34 mmHg (normal). Compared to the prior study on 10/23/2023, mass on mitral valve is more clearly visualized in this study.  Correlate clinically Primary team is notified of findings. SHAILESH BYRD Exam Date:         2024                    10:21 Exam Location:     Inpatient Priority:          Stat Ordering Physician:        LINK CHURCHILL Referring Physician:       [id number] Sonographer:               Lexie Salmeron RDCS Age:    88     Gender:    F MRN:    [id number] :    [id number] BSA:    1.75   Ht (in):    62     Wt (lb):    163 Exam Type:     Complete Indications:     Endocarditis ICD Codes:       421 CPT Codes:       29438 BP:   145    /   78     HR:   72 Technical Quality:       Good MEASUREMENTS  (Male / Female) Normal Values 2D ECHO LV Diastolic Diameter PLAX        4.9 cm                4.2 - 5.9 / 3.9 - 5.3 cm LV Systolic Diameter PLAX         2.7 cm                2.1 - 4.0 cm IVS Diastolic Thickness           1.1 cm                LVPW Diastolic Thickness          1.3 cm                LVOT Diameter                     1.8 cm                DOPPLER AV Peak Velocity                  2.4 m/s               AV Peak Gradient                  22.1 mmHg             AV Mean Gradient                  12 mmHg               LVOT Peak Velocity                1 m/s                 AV Area Cont Eq vti               1.4 cm2               MV Velocity Time  Integral         39.8 cm               MV Pressure Half Time             77.4 ms               MV Area PHT                       2.8 cm2               TR Peak Velocity                  265 cm/s              PV Peak Velocity                  0.96 m/s              PV Peak Gradient                  3.7 mmHg              * Indicates values subject to auto-interpretation LV EF:        % FINDINGS Left Ventricle Normal left ventricular chamber size. Normal left ventricular wall thickness. Hyperdynamic left ventricular systolic function. Visually estimated ejection fraction is 70-75 %. No regional wall motion abnormalities. A reliable estimation of diastolic function cannot be made due to mitral valve disease. Right Ventricle The right ventricle is mildly dilated. Normal right ventricular systolic function. Right Atrium Normal right atrial size. Normal inferior vena cava size and inspiratory collapse. Left Atrium Moderately dilated left atrium. Left atrial volume index is 45 mL/sq m. Mitral Valve Mobile mass noted on mitral valve (posterior leaflet). Mild mitral stenosis. Mean transvalvular gradient is 4 mmHg at a heart rate of 72 BPM. Mild mitral regurgitation. Aortic Valve Known TAVR aortic valve  that is functioning normally with normal transvalvular gradients. Transvalvular gradients are - Peak: 25 mmHg,  Mean: 14 mmHg. No evidence of paravalvular leak. Tricuspid Valve Structurally normal tricuspid valve. No tricuspid stenosis. Trace tricuspid regurgitation. Right atrial pressure is estimated to be 3 mmHg. Right ventricular systolic pressure is estimated to be 34 mmHg. Pulmonic Valve The pulmonic valve is not well visualized. No pulmonic stenosis. Mild pulmonic insufficiency. Pericardium No pericardial effusion. Aorta The ascending aorta diameter is 3.3 cm. Iman Zhong MD (Electronically Signed) Final Date:     29 March 2024                 11:55    MR-BRAIN-WITH & W/O    Result Date: 3/29/2024  3/28/2024 3:46 PM  HISTORY/REASON FOR EXAM: questinable seizure Altered level of consciousness TECHNIQUE/EXAM DESCRIPTION: T1 sagittal, T2 axial, flair coronal, T1 coronal, and diffusion-weighted axial images were obtained of the brain pre-contrast followed by T1 coronal and axial images post intravenous administration of 15 mL ProHance. COMPARISON: CT brain dated 3/26/2024 FINDINGS: Multiple small foci of acute infarction are noted in the bilateral cerebellum, brainstem involving the left chris, the right midbrain posteriorly, bilateral external capsular regions, right insular region, right frontal region, left medial frontal region,  bilateral parietal cortex and subcortical region, left corpus callosum and bilateral high parietal and frontal convexity cortex and subcortical region. Intracranial flow voids are normal. Gradient-echo images do not demonstrate any evidence of hemorrhage. Postcontrast images do not demonstrate any abnormal intracranial enhancement. Age-related volume loss noted with prominent sulci, cisterns and ventricles. Ventricular dilatation is proportionate to the degree of cerebral volume loss. There is no abnormal intracranial enhancement. The Sella is within normal limits. The craniocervical junction is within normal limits. Visualized intracranial arterial flow voids are within normal limits. Bone marrow signal in the calvarium is within normal limits. Included portions of the paranasal sinuses are within normal limits. Included portions of the mastoid air cells are within normal limits. Included portions of the orbits are within normal limits     Multiple foci of acute infarction scattered throughout the bilateral cerebral hemispheres, cerebellum and brainstem as described above, suggesting a proximal cardiovascular embolic source. The largest cluster of acute infarctions is noted in the right frontoparietal region involving the right MCA territory. Age-related volume loss.    CT-CTA NECK WITH & W/O-POST  PROCESSING    Result Date: 3/26/2024  3/26/2024 4:35 PM HISTORY/REASON FOR EXAM:  code stroke LEFT-sided weakness, altered mental status. TECHNIQUE/EXAM DESCRIPTION: CT angiogram of the neck with contrast. Postcontrast images were obtained of the neck from the great vessels through the skull base following the power injection of nonionic contrast at 5.0 mL/sec. Thin-section helical images were obtained with overlapping reconstruction interval. Coronal and oblique multiplanar volume reformats were generated. Cervical internal carotid artery percent stenosis is calculated using the standard method according to the NASCET criteria wherein a segment of uniform caliber mid or distal cervical internal carotid is used as the reference denominator. 3D angiographic images were reviewed on PACS.  Maximum intensity projection (MIP) images were generated and reviewed 80 mL of Omnipaque 350 nonionic contrast was injected intravenously. Low dose optimization technique was utilized for this CT exam including automated exposure control and adjustment of the mA and/or kV according to patient size. COMPARISON:  None. FINDINGS: Aortic arch: conventional branching pattern. There is atherosclerotic plaque of the aorta. Right common carotid artery: Patent Right internal carotid artery: Atherosclerotic plaque without significant stenosis (less than 50%). Left common carotid artery is patent. Left internal carotid artery: Atherosclerotic plaque without significant stenosis (less than 50%). The right vertebral artery is patent without dissection or stenosis. The left vertebral artery is patent without dissection or stenosis.  LEFT dominant vertebral artery. Vertebrobasilar confluence: The vertebrobasilar confluence appears normal. Lung apices are clear The soft tissues of the neck are within normal limits. Multilevel degenerative changes cervical spine. 3D angiographic/MIP images of the vasculature confirm the vascular findings as described  above.     No focal high-grade stenosis, dissection or occlusion of the cervical carotid or vertebral arteries.    CT-CTA HEAD WITH & W/O-POST PROCESS    Result Date: 3/26/2024  3/26/2024 4:35 PM HISTORY/REASON FOR EXAM:  code stroke.  LEFT-sided weakness, altered mental status. TECHNIQUE/EXAM DESCRIPTION: CT angiogram of the [place] without and with contrast.  Initial precontrast images were obtained of the head from the skull base through the vertex.  Postcontrast images were obtained of the [place] following the power injection of nonionic contrast at 5.0 mL/sec. Thin-section helical images were obtained with overlapping reconstruction interval. Coronal and sagittal multiplanar volume reformats were generated.  3D angiographic images were reviewed on PACS.  Maximum intensity projection (MIP) images were generated and reviewed. 80 mL of Omnipaque 350 nonionic contrast was injected intravenously. Low dose optimization technique was utilized for this CT exam including automated exposure control and adjustment of the mA and/or kV according to patient size. COMPARISON:  CT head 3/26/2024 FINDINGS: The posterior circulation shows the distal vertebral arteries to be patent.  LEFT dominant vertebral artery.  The vertebrobasilar confluence is intact. The basilar artery is patent. No aneurysm or occlusive lesion is evident. Anterior cerebral arteries are normal in caliber and patent.  LEFT middle cerebral artery is patent.  Potential focal narrowing or nonocclusive clot in superior division RIGHT sylvian vessel, M3 segment. 3D angiographic/MIP images of the vasculature confirm the vascular findings as described above.     1.  Short segment decreased enhancement in the RIGHT superior sylvian division of middle superior artery, M3 segment, focal stenosis versus nonocclusive clot. 2.  No intracranial aneurysm or abrupt large vessel cut off.    CT-HEAD W/O    Result Date: 3/26/2024  3/26/2024 4:40 PM  HISTORY/REASON FOR EXAM:  Concern for stroke. TECHNIQUE/EXAM DESCRIPTION AND NUMBER OF VIEWS: CT of the head without contrast. The study was performed on a helical multidetector CT scanner. Contiguous axial sections were obtained from the skull base through the vertex. Up to date radiation dose reduction adjustments have been utilized to meet ALARA standards for radiation dose reduction. COMPARISON:  Noncontrast head CT performed one day prior FINDINGS: Few small scattered parenchymal calcifications suggesting sequela of previous infection or inflammation. Chronic appearing bilateral basal ganglia lacunar infarcts. Hypoattenuating foci in the bilateral cerebellum which are indeterminant. They could be subacute infarcts versus other. Recommend brain MRI workup. Redemonstration of a small focus of low-attenuation in the left parietal deep and subcortical white matter. Possibly gliosis versus subacute infarct. Moderate nonspecific white matter changes. No intracranial mass effect, midline shift, hydrocephalus, hemorrhage. Bilateral maxillary sinus mucosal thickening. Left maxillary sinus mucosal retention cyst. Mastoids in the field of view are unremarkable.     1.  Hypoattenuating foci in the bilateral cerebellum which are indeterminant. They could be subacute infarcts versus other. Recommend brain MRI workup. 2.  Redemonstration of a small focus of low-attenuation in the left parietal deep and subcortical white matter. Possibly gliosis versus subacute infarct. 3.  Findings are stable since the noncontrast head CT performed one day prior.     CT-HEAD W/O    Result Date: 3/25/2024  3/25/2024 4:13 PM HISTORY/REASON FOR EXAM:  ALTERED LEVEL OF CONSCIOUSNESS. Patient found down. TECHNIQUE/EXAM DESCRIPTION AND NUMBER OF VIEWS: CT of the head without contrast. The study was performed on a [employer] CT scanner. Contiguous 5 mm axial sections were obtained from the skull base through the vertex. Up to date radiation dose reduction  adjustments have been utilized to meet ALARA standards for radiation dose reduction. COMPARISON:  None FINDINGS: Small, rounded hypodense areas of the bilateral cerebellar hemispheres. There is a small area of loss of gray-white differentiation in the left parietal lobe. The calvariae and skull base are unremarkable. There are no extraaxial fluid collections. There is a pattern of cerebral atrophy manifest as enlargement of sulcal markings and ventricular prominence.  The ventricular system and basal cisterns are otherwise unremarkable. There are areas of hypodensity in the white matter most consistent with small vessel ischemic change versus demyelination or gliosis. There are no hemorrhagic lesions. There are no mass effects or shift of midline structures. The paranasal sinuses and mastoids in the field of view are unremarkable.     1.  Small, rounded hypodense areas of the bilateral cerebellar hemispheres. These may represent age-indeterminate infarcts. Less likely this could represent edema related to metastatic disease. 2.  There is a small area of loss of gray-white differentiation in the left parietal lobe, possibly an age-indeterminate infarct. 3.  These findings can be further evaluated with contrast-enhanced MRI as indicated. 4.  Cerebral atrophy. 5.  White matter lucencies most consistent with    DX-CHEST-PORTABLE (1 VIEW)    Result Date: 3/25/2024  3/25/2024 2:54 PM HISTORY/REASON FOR EXAM:  Sepsis; sepsis. Altered level of consciousness. TECHNIQUE/EXAM DESCRIPTION AND NUMBER OF VIEWS: Single portable view of the chest. COMPARISON: Chest radiograph, 3/20/2024. FINDINGS: Lungs: No focal opacities were evident. No pleural effusion or visible pneumothorax. Mediastinum: The descending colon post surgical changes of TAVR. Aortic annular calcifications. Calcified atherosclerotic plaques in aorta. Other: Stable age-related degenerative changes in the spine and shoulders and diffuse decreased bone  mineralization.     No acute findings.    DX-CHEST-PORTABLE (1 VIEW)    Result Date: 3/20/2024  3/20/2024 10:49 AM HISTORY/REASON FOR EXAM:  hypoxia TECHNIQUE/EXAM DESCRIPTION AND NUMBER OF VIEWS: Single portable view of the chest. COMPARISON: 9/26/2023 FINDINGS: There is TAVR hardware. HEART: Stable size. There is atherosclerotic calcification in the aortic arch. LUNGS: Lung volumes are low. There is faint LEFT basilar opacity similar to prior. PLEURA: No effusion or pneumothorax.     1.  Persistently enlarged cardiac silhouette 2.  LEFT basilar opacity likely atelectasis rather than pneumonia      Micro:  Results       Procedure Component Value Units Date/Time    BLOOD CULTURE [[id number]] Collected: 03/27/24 0428    Order Status: Completed Specimen: Blood from Peripheral Updated: 04/01/24 0500     Significant Indicator NEG     Source BLD     Site PERIPHERAL     Culture Result No growth after 5 days of incubation.    Narrative:      Left    BLOOD CULTURE [[id number]] Collected: 03/27/24 0409    Order Status: Completed Specimen: Blood from Peripheral Updated: 04/01/24 0500     Significant Indicator NEG     Source BLD     Site PERIPHERAL     Culture Result No growth after 5 days of incubation.    Narrative:      Left Hand    Blood Culture - Draw one from central line and one from peripheral site [[id number]]  (Abnormal) Collected: 03/25/24 1425    Order Status: Completed Specimen: Blood from Peripheral Updated: 03/30/24 1026     Significant Indicator POS     Source BLD     Site PERIPHERAL     Culture Result Growth detected by Bactec instrument. 03/26/2024  12:56      Streptococcus gordonii  Please see newest culture for susceptibilities      Narrative:      CALL  Cramer  61 tel. [phone],  CALLED  61 tel. [phone] 03/26/2024, 17:21, RB PERF. RESULTS CALLED TO:  Voalted to Blood Culture Pharmacy, no ID  No site indicated    E-Test [[id number]] Collected: 03/25/24 1540    Order Status: Completed Specimen: Other Updated:  03/30/24 1024     ETEST Sensitivity FINAL    Narrative:      THAD tel. [phone] 03/28/2024, 05:22, RB PERF. RESULTS CALLED TO: RN [id number]    Blood Culture - Draw one from central line and one from peripheral site [[id number]]  (Abnormal)  (Susceptibility) Collected: 03/25/24 1540    Order Status: Completed Specimen: Blood from Line Updated: 03/30/24 1023     Significant Indicator POS     Source BLD     Site Peripheral     Culture Result Growth detected by Bactec instrument. 03/28/2024  05:17      Streptococcus gordonii    Narrative:      CALL  Cramer  THAD tel. [phone],  CALLED  THAD tel. [phone] 03/28/2024, 05:22, RB PERF. 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    Micro reviewed    Patient more somnolent today per son.  Still very confused and only oriented x 1.  Continued on penicillin as below.    Assessment/Hospital course:  This is an 88-year-old female patient who presented after a fall for somnolence, altered mental status.  She has known protein S  deficiency on Xarelto.  MRI of the brain with multiple acute infarcts, blood cultures x 2 positive for Strep gordonii, status post TAVR.  TTE with echodensity on the posterior leaflet of the native mitral valve, EKG with second-degree AV block concerning for perivalvular abscess.     Patient had a recent dental procedure about a month ago and has a temporary crown in place.     Pertinent Diagnoses:  Native mitral valve infective endocarditis  Strep gordonii bacteremia, penicillin susceptible  Second-degree AV block concerning for perivalvular abscess  Multiple acute infarcts, concerning for septic embolization to the brain  Protein S deficiency on Xarelto  Acute encephalopathy  Status post TAVR in 9/2023  Asymptomatic bacteriuria     Plan:  -Continue IV penicillin 3 million units every 4 hours  -Repeat blood cultures x 2 from 3/27 negative  -VINAY currently postponed due to medical status.  Potentially can do this on Monday but will need to see how she is doing over the weekend. We will follow along.  Of note, according to patient's son, she does not have a well adhered crown in place, just a temporary one so please make sure this is communicated with the proceduralists  -CT maxillofacial with no evidence of dental abscesses  -MRI of the whole spine with no evidence of discitis/osteomyelitis or abscess.  There was some thickening of the presacral soft tissues CT may be of interest     Disposition: Anticipate at least 6 weeks of IV antibiotics at a facility.  Not cleared for discharge at this time  Need for PICC line: Eventually yes, currently not cleared     Discussed with Dr. Cruz.  This illness poses threat to life.  ID will follow

## 2024-04-05 NOTE — CONSULTS
Palliative Care follow-up  Consult received and EMR reviewed. Case discussed with MD, updates appreciated. Physician has had extensive GOC discussions with patient's son/POA and GOC are established at this time. Palliative care consulted for extra layer of support, ongoing GOC discussions as clinical picture unfolds.    Updated: Dr. Cruz    Plan: Patient to be seen next week for ongoing GOC discussions.     Thank you for allowing Palliative Care to support this patient and family. Contact [phone] for additional assistance, change in patient status, or with any questions/concerns.      TREVON Bryant  Palliative Care Nurse Practitioner   [phone]

## 2024-04-05 NOTE — PROGRESS NOTES
[facility] Medicine Daily Progress Note    Date of Service  4/5/2024    Chief Complaint  Marry Mathur is a 88 y.o. female admitted 3/25/2024 with altered mental status    Hospital Course  88 y.o. female who presented 3/25/2024 with altered mental status.  Per EMS, neighbors checked on patient and found her on the ground, unclear of how long she was actually down on the ground.  She was then brought to ER for evaluation.  Patient found to have UTI, rhabdomyolysis, DASHA. She was admitted for treatment of metabolic encephalopathy. Patient had acute lethargy requiring rapid response 3/26, code stroke activated. She had equivocal EEG and CT head, but MRI brain showed numerous embolic infarcts across bilateral hemispheres concerning for cardioembolic stroke. Blood cultures positive for streptococcus gordonii, urine culture with E coli. Cardiology and ID consulted who recommend VINAY. Repeat blood cultures so far negative. Plan for VINAY when patient is more alert.       Interval Problem Update  Patient was seen and examined at bedside.  No acute events overnight. Patient is resting comfortably in bed and in no acute distress. Son updated at bedside.     Not safe to proceed with VINAY at this time, reevaluate next week  MRI spine completed and reviewed   CT maxillofacial reviewed  Tube feeds  ID recs  Continue IV penicillin  Palliative care consulted    I have discussed this patient's plan of care and discharge plan at IDT rounds today with Case Management, Nursing, Nursing leadership, and other members of the IDT team.    Consultants/Specialty  General Neurology   Vascular Neurology    Code Status  DNAR/DNI    Disposition  The patient is not medically cleared for discharge to home or a post-acute facility.    Patient son prefers that patient go to SNF in [place] when clear for discharge    I have placed the appropriate orders for post-discharge needs.    Review of Systems  Review of Systems   Unable to perform ROS: Acuity of  condition   All other systems reviewed and are negative.       Physical Exam  Temp:  [36.5 °C (97.7 °F)-37.1 °C (98.8 °F)] 36.6 °C (97.9 °F)  Pulse:  [71-83] 73  Resp:  [16-22] 16  BP: (118-163)/(49-93) 118/49  SpO2:  [92 %-95 %] 95 %    Physical Exam  Vitals and nursing note reviewed.   Constitutional:       General: She is not in acute distress.     Appearance: She is ill-appearing.   HENT:      Head: Normocephalic and atraumatic.      Right Ear: External ear normal.      Left Ear: External ear normal.      Nose: No congestion.      Mouth/Throat:      Pharynx: Oropharynx is clear.   Eyes:      General: No scleral icterus.     Conjunctiva/sclera: Conjunctivae normal.   Cardiovascular:      Rate and Rhythm: Normal rate and regular rhythm.      Pulses: Normal pulses.   Pulmonary:      Effort: Pulmonary effort is normal.      Breath sounds: Normal breath sounds. No wheezing.   Abdominal:      General: Abdomen is flat.      Palpations: Abdomen is soft.      Tenderness: There is no abdominal tenderness. There is no guarding or rebound.   Musculoskeletal:         General: No deformity. Normal range of motion.      Cervical back: Neck supple.   Skin:     General: Skin is warm and dry.      Coloration: Skin is not jaundiced.      Findings: No bruising.   Neurological:      General: No focal deficit present.      Mental Status: She is alert.      Cranial Nerves: No cranial nerve deficit.      Motor: Weakness present.      Comments: A&O x1 (hospital)  Responds to commands  More alert today  Moves extremities when instructed   Psychiatric:         Mood and Affect: Mood is anxious.      Comments: Unable to assess         Fluids    Intake/Output Summary (Last 24 hours) at 4/5/2024 1339  Last data filed at 4/5/2024 0600  Gross per 24 hour   Intake 719 ml   Output 1700 ml   Net -981 ml           Laboratory  Recent Labs     04/03/24  0119 04/04/24  0108   WBC 6.7 7.4   RBC 3.82* 3.93*   HEMOGLOBIN 11.0* 11.6*   HEMATOCRIT 33.0*  33.8*   MCV 86.4 86.0   MCH 28.8 29.5   MCHC 33.3 34.3   RDW 43.7 43.2   PLATELETCT 203 214   MPV 10.1 10.1     Recent Labs     04/03/24  0119 04/04/24  0108   SODIUM 130* 130*   POTASSIUM 3.4* 3.9   CHLORIDE 100 96   CO2 20 21   GLUCOSE 161* 143*   BUN 10 12   CREATININE 0.38* 0.44*   CALCIUM 7.2* 8.1*                     Imaging  MR-THORACIC SPINE-WITH & W/O   Final Result      1.  Dorsal kyphosis.   2.  Degenerative changes as described above.   3.  No evidence of discitis, osteomyelitis, or epidural abscess.      MR-CERVICAL SPINE-WITH & W/O   Final Result      1.  Multilevel degenerative changes most notable for mild central stenosis at C5-C6. Details for each level above in the body of report.   2.  Concerning the history of bacteremia, no evidence of discitis, osteomyelitis, epidural abscess, or epidural phlegmon.   3.  No myelopathic cord signal abnormality.      MR-LUMBAR SPINE-WITH & W/O   Final Result      1.  Postoperative changes and chronic degenerative changes as described above, most notable for L2-3 mild-moderate central stenosis. Details for each level above in the body of report.   2.  No evidence of discitis, osteomyelitis, or epidural abscess.   3.  Induration/thickening of the presacral soft tissues. Posterior pelvic inflammatory process/phlegmon would not be excluded. CT of the abdomen and pelvis with contrast may be of interest.      DX-ABDOMEN FOR TUBE PLACEMENT   Final Result         1.  Hazy left lower lobe infiltrates   2.  Small left pleural effusion   3.  Nasogastric tube tip terminates overlying the expected location of the gastric body.   4.  Cardiomegaly   5.  Atherosclerotic      CT-MAXILLOFACIAL WITH PLUS RECONS   Final Result      1.  Mild mucosal thickening noted in paranasal sinuses. No air-fluid levels.      2.  No soft tissue fluid collection or abscess identified.      3.  No bone erosion or bone destruction is identified.      CT-HEAD W/O   Final Result      1. Stable patchy  areas of decreased attenuation at the gray-white matter junctions in both cerebral hemispheres and cerebellar hemispheres when compared with the prior MRI of the brain performed on 3/28/2024.   2. No hemorrhagic transformation.   3. No other acute findings.         EC-ECHOCARDIOGRAM COMPLETE W/O CONT   Final Result      MR-BRAIN-WITH & W/O   Final Result         Multiple foci of acute infarction scattered throughout the bilateral cerebral hemispheres, cerebellum and brainstem as described above, suggesting a proximal cardiovascular embolic source.      The largest cluster of acute infarctions is noted in the right frontoparietal region involving the right MCA territory.      Age-related volume loss.      CT-CTA NECK WITH & W/O-POST PROCESSING   Final Result      No focal high-grade stenosis, dissection or occlusion of the cervical carotid or vertebral arteries.      CT-CTA HEAD WITH & W/O-POST PROCESS   Final Result      1.  Short segment decreased enhancement in the RIGHT superior sylvian division of middle superior artery, M3 segment, focal stenosis versus nonocclusive clot.   2.  No intracranial aneurysm or abrupt large vessel cut off.      CT-HEAD W/O   Final Result      1.  Hypoattenuating foci in the bilateral cerebellum which are indeterminant. They could be subacute infarcts versus other. Recommend brain MRI workup.   2.  Redemonstration of a small focus of low-attenuation in the left parietal deep and subcortical white matter. Possibly gliosis versus subacute infarct.   3.  Findings are stable since the noncontrast head CT performed one day prior.         CT-HEAD W/O   Final Result      1.  Small, rounded hypodense areas of the bilateral cerebellar hemispheres. These may represent age-indeterminate infarcts. Less likely this could represent edema related to metastatic disease.   2.  There is a small area of loss of gray-white differentiation in the left parietal lobe, possibly an age-indeterminate infarct.    3.  These findings can be further evaluated with contrast-enhanced MRI as indicated.   4.  Cerebral atrophy.   5.  White matter lucencies most consistent with      DX-CHEST-PORTABLE (1 VIEW)   Final Result      No acute findings.      EC-VINAY W/O CONT    (Results Pending)        Assessment/Plan  * Bacteremia due to Streptococcus  Assessment & Plan  Blood cultures 3/25 with strep gordonii  Repeat blood cultures 3/27 taken  Urine culture positive for E coli  Echo shows 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, cardiology consulted  VINAY when patient stable for procedure  MRI spine reviewed - no obvious abscess or osteomyelitis identified  Continue IV penicillin    Stroke (cerebrum) (HCC)  Assessment & Plan  MRI brain confirms multiple foci of acute infarction bilaterally concerning for cardioembolic strokes  Possible septic emboli from endocarditis  Patient with hx of protein S deficiency and DVT, on anticoagulation at home, currently AC is held  Neurology following      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, based on echo showing 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 in setting of positive blood cultures  MRI brain with multiple foci of acute infarcts concerning for cardioembolic stroke  On ceftriaxone  Following blood cultures  ID, neurology, cardiology following  Echo shows 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, cardiology consulted  VINAY when patient stable for procedure    Acute metabolic encephalopathy- (present on admission)  Assessment & Plan  Patient presents with confusion.  UA pertinent for UTI, likely culprit for encephalopathy.  CT head showing no acute changes  Antibiotics for UTI   S/p fluids  On 3/26 code stroke was activated, per vascular neurology they think stroke unlikely   -CTA did have some narrowing, recommended to keep blood pressure above 120  MRI brain shows multiple foci of acute infarcts across bilateral hemispheres, concerning for cardioembolic stroke  EEG without seizure activity  Echo shows mobile mass on mitral  valve, cardiology consulted  VINAY when patient stable for procedure    Moderate protein-calorie malnutrition (HCC)  Assessment & Plan  Very poor oral intake  Tube feeds    Acute cystitis without hematuria  Assessment & Plan  Continue ancef  Urine culture with E coli    S/P TAVR (transcatheter aortic valve replacement)- (present on admission)  Assessment & Plan  Performed in September 2023    Protein S deficiency (HCC)- (present on admission)  Assessment & Plan  Holding home xarelto - restart after patient has had sufficient treatment for endocarditis    History of recurrent deep vein thrombosis (DVT)- (present on admission)  Assessment & Plan  On Xarelto at home  Hold AC for now given strokes    ACP (advance care planning)  Assessment & Plan  Code status changed to DNR/DNI per son Nando and per review of patients advance care documents. I have continued to tell Nando that patient has suffered some severe neurologic damage give her stoke and septic emboli. Patient is lethargic, A&O x1, minimially interactive on exam, not eating and fully dependent on tube feeds for nutrition. I have told Nando he may have some difficult decisions to make including end of life care if patient does not improve. Patient [place] on [place] and spends her time very active tending to her property, Nando agrees patients current quality of life is not acceptable. Nando is hopeful that patient can improve and remains focused on the diagnostic process. I will place a palliative care consult. Total of 23 minutes spent in discussion and coordination of advance care planning.     Other hyperlipidemia- (present on admission)  Assessment & Plan  Lipitor    Hypertension  Assessment & Plan  Restart         VTE prophylaxis: SCDs    Greater than 53 minutes spent prepping to see patient (e.g. review of tests) obtaining and/or reviewing separately obtained history. Performing a medically appropriate examination and/ evaluation.  Counseling and educating the  patient/family/caregiver.  Ordering medications, tests, or procedures.  Referring and communicating with other health care professionals.  Documenting clinical information in EPIC.  Independently interpreting results and communicating results to patient/family/caregiver.  Care coordination.

## 2024-04-05 NOTE — DISCHARGE PLANNING
Spoke with Nando, patients son at bedside, he has someone reviewing SNF facilities in [facility], He says he is going to contact medical transport companies here in [place] for pricing. Understands mom will need long term antibiotics at the admitting facility, asked him to notify case management as soon as he finds a facility in [facility] so we can send them a referral. Explained it may take them a while to set everything up and need to do it ASAP, I explained once patient is medically cleared she needs to have everything set up and that staying in the hospital is not an option. Explained if not set up, patient will need to go to a local SNF here in [place] and then transfer to [facility]. We have accepting facilities in [place], verbalized understanding.

## 2024-04-05 NOTE — THERAPY
Speech Language Pathology   Daily Treatment     Patient Name: Marry Mathur  AGE:  88 y.o., SEX:  female  Medical Record #: [id number]  Date of Service: 4/5/2024      Precautions:  Precautions: Fall Risk, Swallow Precautions, Nasogastric Tube     HPI: 88F admitted on 3/25/24 with AMS, found to have UTI. Code stroke called 3/26 r/t lethargy and L sided weakness, found to have acute stroke per MRI. 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     Subjective  Patient seen this date for dysphagia management. RN reports continued poor mentation but no signs of aspiration with PO; son reports no signs of aspiration during meals. Son hopeful patient will become stronger to be able to meet nutritional needs orally and get surgery.       Assessment  PO presentations of thin liquids (TN0) straw and thin liquids (TN0) tsp. Adequate oral bolus acceptance/containment. Complete AP transfer without notable oral bolus residue upon oral inspection. Chewable solids held r/t mentation. Persistent cough on last straw sip TN0. Swallows appear more effortful (?presence of NGT). Son insists patient demonstrates no signs of aspiration during PO intake; first incidence during this session with SLP. Discussed concern for impaired safety of swallow with even further reduced endurance, generalized weakness, and clinical indicators of aspiration. Discussed plan to cautiously keep on full liquid diet as of now, but if signs of aspiration continue recommend NPO with ice chips/tube feed. Son in full agreement. Discussed recommendation AGAINST PEG tube given patient's advanced age and current prognosis, should PEG tube be offered. RN/MD updated.     Aspiration pneumonia is the most common cause of death after PEG placement (30). Data consistently show that feeding tubes (both NG and PEG) actually increase the risk of aspiration pneumonia, perhaps by  "increasing gastroesophageal reflux or oropharyngeal colonization (31,32). Neurogenic dysphagia patients fed with NG, PEG, jejunostomy, or post-pyloric tubes all have similar rates of aspiration pneumonia (33,34).        Clinical Impressions  Patient presents with new clinical indicators of airway invasion, concerning for pharyngeal dysphagia. Given no reports of signs of aspiration by nursing/son and reduced PO intake anyway, will cautiously keep patient on full liquid diet. Patient has low threshold for NPO - please make NPO with any signs of aspiration and defer nutrition/medication to tube feed. If made NPO, please allow ice chips for comfort. Service to follow up early next week as appropriate. Recommend palliative consult - MD updated.       Recommendations  Diet Consistency: Full/thin liquids  Instrumentation: Instrumental swallow study pending clinical progress  Medication: Crush with applesauce, as appropriate  Supervision: Careful 1:1 hand feeding  Positioning: Fully upright and midline during oral intake  Risk Management : Small bites/sips, Slow rate of intake  Oral Care: Q6h                     SLP Treatment Plan  Treatment Plan: Dysphagia Treatment  SLP Frequency: 3x Per Week  Estimated Duration: Until Therapy Goals Met      Anticipated Discharge Needs  Discharge Recommendations: Recommend post-acute placement for additional speech therapy services prior to discharge home  Therapy Recommendations Upon DC: Dysphagia Training, Cognitive-Linguistic Training      Patient / Family Goals  Patient / Family Goal #1: \"I like the ice\"  Goal #1 Outcome: Progressing as expected  Short Term Goals  Short Term Goal # 1: Patient will consume a full/thin liquid diet without overt indicators of airway invasion or decline in pulmonary status.  Goal Outcome # 1: Progressing slower than expected      Iris Gonzalez, SLP  "

## 2024-04-05 NOTE — PROGRESS NOTES
Monitor summary: SR, HR 73-84, WV 0.18, QRS 0.13, QT 0.40 with (R)PACs and (R)PVCs per strip from monitor room

## 2024-04-05 NOTE — PROGRESS NOTES
Contacted by primary team to inquire about reinitiation of anticoagulation in the setting of baseline Protein S deficiency and hx of prior DVT. Briefly, patient admitted for unwitnessed, unexplained fall. She had waxing and waning mentation. Stroke alerted day after admission for acute onset of left sided weakness and change in mentation which upon my initial examination appeared to have largely resolved in focality, with follow up imaging failing to demonstrate LVO, perfusion changes, evidence of prior hypodensities or hemorrhage. Recommended continued infectious and metabolic work up and primary team were initially treating for UTI. Patient had follow up MRI which did demonstrate multifocal embolic appearing infarcts with TTE consistent for vegetation and endocarditis. ID consulted and patient noted to be bacteremic and now under treatment for endocarditis. Patient thus had OAC held in this setting out of concern for potential infectious emboli and mycotic aneurysm formation which puts patient at high risk for hemorrhagic conversion.     I explained to primary team that in general, we would hold anticoagulation in this setting until completion of antibiotic therapy and after being deemed fully treated for endocarditis. I estimated that this would roughly equate to reinitiation of anti-coagulation at six weeks time. Did explain that each case requires some degree of individualization and that the risk of forming further clots in the setting of heritable thrombophilia needs to be weighed against the risk of hemorrhagic conversion. I did explain that I am unaware of the specific risk posed by Protein S deficiency and did recommend they consider reaching out to hematology oncology for further recommendations if they deemed this necessary.     Vascular neurology has no further recommendations at this time. Please call with questions or concerns.

## 2024-04-06 LAB
ALBUMIN SERPL BCP-MCNC: 2.6 G/DL (ref 3.2–4.9)
BASOPHILS # BLD AUTO: 0.8 % (ref 0–1.8)
BASOPHILS # BLD: 0.06 K/UL (ref 0–0.12)
BUN SERPL-MCNC: 21 MG/DL (ref 8–22)
CALCIUM ALBUM COR SERPL-MCNC: 9.9 MG/DL (ref 8.5–10.5)
CALCIUM SERPL-MCNC: 8.8 MG/DL (ref 8.5–10.5)
CHLORIDE SERPL-SCNC: 96 MMOL/L (ref 96–112)
CO2 SERPL-SCNC: 26 MMOL/L (ref 20–33)
CREAT SERPL-MCNC: 0.52 MG/DL (ref 0.5–1.4)
EOSINOPHIL # BLD AUTO: 0.15 K/UL (ref 0–0.51)
EOSINOPHIL NFR BLD: 1.9 % (ref 0–6.9)
ERYTHROCYTE [DISTWIDTH] IN BLOOD BY AUTOMATED COUNT: 42 FL (ref 35.9–50)
GFR SERPLBLD CREATININE-BSD FMLA CKD-EPI: 90 ML/MIN/1.73 M 2
GLUCOSE SERPL-MCNC: 121 MG/DL (ref 65–99)
HCT VFR BLD AUTO: 35.1 % (ref 37–47)
HGB BLD-MCNC: 12.2 G/DL (ref 12–16)
IMM GRANULOCYTES # BLD AUTO: 0.04 K/UL (ref 0–0.11)
IMM GRANULOCYTES NFR BLD AUTO: 0.5 % (ref 0–0.9)
LYMPHOCYTES # BLD AUTO: 0.88 K/UL (ref 1–4.8)
LYMPHOCYTES NFR BLD: 11.3 % (ref 22–41)
MAGNESIUM SERPL-MCNC: 1.9 MG/DL (ref 1.5–2.5)
MCH RBC QN AUTO: 29.5 PG (ref 27–33)
MCHC RBC AUTO-ENTMCNC: 34.8 G/DL (ref 32.2–35.5)
MCV RBC AUTO: 84.8 FL (ref 81.4–97.8)
MONOCYTES # BLD AUTO: 0.97 K/UL (ref 0–0.85)
MONOCYTES NFR BLD AUTO: 12.5 % (ref 0–13.4)
NEUTROPHILS # BLD AUTO: 5.68 K/UL (ref 1.82–7.42)
NEUTROPHILS NFR BLD: 73 % (ref 44–72)
NRBC # BLD AUTO: 0 K/UL
NRBC BLD-RTO: 0 /100 WBC (ref 0–0.2)
PHOSPHATE SERPL-MCNC: 4.4 MG/DL (ref 2.5–4.5)
PLATELET # BLD AUTO: 250 K/UL (ref 164–446)
PMV BLD AUTO: 10.5 FL (ref 9–12.9)
POTASSIUM SERPL-SCNC: 4.1 MMOL/L (ref 3.6–5.5)
RBC # BLD AUTO: 4.14 M/UL (ref 4.2–5.4)
SODIUM SERPL-SCNC: 134 MMOL/L (ref 135–145)
WBC # BLD AUTO: 7.8 K/UL (ref 4.8–10.8)

## 2024-04-06 PROCEDURE — 99233 SBSQ HOSP IP/OBS HIGH 50: CPT | Mod: 25 | Performed by: INTERNAL MEDICINE

## 2024-04-06 PROCEDURE — 700105 HCHG RX REV CODE 258

## 2024-04-06 PROCEDURE — 99497 ADVNCD CARE PLAN 30 MIN: CPT | Performed by: INTERNAL MEDICINE

## 2024-04-06 PROCEDURE — 85025 COMPLETE CBC W/AUTO DIFF WBC: CPT

## 2024-04-06 PROCEDURE — 700102 HCHG RX REV CODE 250 W/ 637 OVERRIDE(OP): Performed by: INTERNAL MEDICINE

## 2024-04-06 PROCEDURE — 83735 ASSAY OF MAGNESIUM: CPT

## 2024-04-06 PROCEDURE — A9270 NON-COVERED ITEM OR SERVICE: HCPCS | Performed by: INTERNAL MEDICINE

## 2024-04-06 PROCEDURE — 80069 RENAL FUNCTION PANEL: CPT

## 2024-04-06 PROCEDURE — 36415 COLL VENOUS BLD VENIPUNCTURE: CPT

## 2024-04-06 PROCEDURE — 770020 HCHG ROOM/CARE - TELE (206)

## 2024-04-06 PROCEDURE — 700111 HCHG RX REV CODE 636 W/ 250 OVERRIDE (IP)

## 2024-04-06 RX ADMIN — SODIUM CHLORIDE 3 MILLION UNITS: 9 INJECTION, SOLUTION INTRAVENOUS at 04:26

## 2024-04-06 RX ADMIN — AMLODIPINE BESYLATE 2.5 MG: 2.5 TABLET ORAL at 17:53

## 2024-04-06 RX ADMIN — ATORVASTATIN CALCIUM 20 MG: 20 TABLET, FILM COATED ORAL at 17:51

## 2024-04-06 RX ADMIN — SODIUM CHLORIDE 3 MILLION UNITS: 9 INJECTION, SOLUTION INTRAVENOUS at 10:36

## 2024-04-06 RX ADMIN — SODIUM CHLORIDE 3 MILLION UNITS: 9 INJECTION, SOLUTION INTRAVENOUS at 14:15

## 2024-04-06 RX ADMIN — LOSARTAN POTASSIUM 50 MG: 50 TABLET, FILM COATED ORAL at 04:21

## 2024-04-06 RX ADMIN — SODIUM CHLORIDE 3 MILLION UNITS: 9 INJECTION, SOLUTION INTRAVENOUS at 01:46

## 2024-04-06 RX ADMIN — SODIUM CHLORIDE 3 MILLION UNITS: 9 INJECTION, SOLUTION INTRAVENOUS at 17:53

## 2024-04-06 RX ADMIN — SODIUM CHLORIDE 3 MILLION UNITS: 9 INJECTION, SOLUTION INTRAVENOUS at 23:15

## 2024-04-06 NOTE — PROGRESS NOTES
Attempted feeding patient PO diet (full liquid). Patient showed signs of aspiration with first 2 bites. Per SLP note, made patient NPO.

## 2024-04-06 NOTE — PROGRESS NOTES
[facility] Medicine Daily Progress Note    Date of Service  4/6/2024    Chief Complaint  Marry Mathur is a 88 y.o. female admitted 3/25/2024 with altered mental status    Hospital Course  88 y.o. female who presented 3/25/2024 with altered mental status.  Per EMS, neighbors checked on patient and found her on the ground, unclear of how long she was actually down on the ground.  She was then brought to ER for evaluation.  Patient found to have UTI, rhabdomyolysis, DASHA. She was admitted for treatment of metabolic encephalopathy. Patient had acute lethargy requiring rapid response 3/26, code stroke activated. She had equivocal EEG and CT head, but MRI brain showed numerous embolic infarcts across bilateral hemispheres concerning for cardioembolic stroke. Blood cultures positive for streptococcus gordonii, urine culture with E coli. Cardiology and ID consulted who recommend VINAY. Repeat blood cultures so far negative. Plan for VINAY when patient is more alert.       Interval Problem Update  Patient was seen and examined at bedside.  No acute events overnight. Patient is resting comfortably in bed and in no acute distress. Patient son at bedside. Extensive update provided to patient sgrandson at bedside.     VINAY at cardiology discretion  MRI spine completed and reviewed   CT maxillofacial reviewed  Tube feeds  ID recs  Continue IV penicillin    I have discussed this patient's plan of care and discharge plan at IDT rounds today with Case Management, Nursing, Nursing leadership, and other members of the IDT team.    Consultants/Specialty  General Neurology   Vascular Neurology    Code Status  DNAR/DNI    Disposition  The patient is not medically cleared for discharge to home or a post-acute facility.    Patient son prefers that patient go to SNF in [place] when clear for discharge    I have placed the appropriate orders for post-discharge needs.    Review of Systems  Review of Systems   Unable to perform ROS: Acuity of  condition   All other systems reviewed and are negative.       Physical Exam  Temp:  [36.5 °C (97.7 °F)-37.1 °C (98.7 °F)] 37.1 °C (98.7 °F)  Pulse:  [77-83] 78  Resp:  [16-20] 17  BP: (122-181)/() 122/87  SpO2:  [94 %-99 %] 98 %    Physical Exam  Vitals and nursing note reviewed.   Constitutional:       General: She is not in acute distress.     Appearance: She is ill-appearing.   HENT:      Head: Normocephalic and atraumatic.      Right Ear: External ear normal.      Left Ear: External ear normal.      Nose: No congestion.      Mouth/Throat:      Pharynx: Oropharynx is clear.   Eyes:      General: No scleral icterus.     Conjunctiva/sclera: Conjunctivae normal.   Cardiovascular:      Rate and Rhythm: Normal rate and regular rhythm.      Pulses: Normal pulses.   Pulmonary:      Effort: Pulmonary effort is normal.      Breath sounds: Normal breath sounds. No wheezing.   Abdominal:      General: Abdomen is flat.      Palpations: Abdomen is soft.      Tenderness: There is no abdominal tenderness. There is no guarding or rebound.   Musculoskeletal:         General: No deformity. Normal range of motion.      Cervical back: Neck supple.   Skin:     General: Skin is warm and dry.      Coloration: Skin is not jaundiced.      Findings: No bruising.   Neurological:      General: No focal deficit present.      Mental Status: She is alert.      Cranial Nerves: No cranial nerve deficit.      Motor: Weakness present.      Comments: A&O x1 (hospital)  Responds to commands  More alert today  Moves extremities when instructed   Psychiatric:         Mood and Affect: Mood is anxious.      Comments: Unable to assess         Fluids    Intake/Output Summary (Last 24 hours) at 4/6/2024 1610  Last data filed at 4/6/2024 0901  Gross per 24 hour   Intake 1120 ml   Output 900 ml   Net 220 ml           Laboratory  Recent Labs     04/04/24  0108 04/06/24  0107   WBC 7.4 7.8   RBC 3.93* 4.14*   HEMOGLOBIN 11.6* 12.2   HEMATOCRIT 33.8*  35.1*   MCV 86.0 84.8   MCH 29.5 29.5   MCHC 34.3 34.8   RDW 43.2 42.0   PLATELETCT 214 250   MPV 10.1 10.5     Recent Labs     04/04/24  0108 04/06/24  0107   SODIUM 130* 134*   POTASSIUM 3.9 4.1   CHLORIDE 96 96   CO2 21 26   GLUCOSE 143* 121*   BUN 12 21   CREATININE 0.44* 0.52   CALCIUM 8.1* 8.8                     Imaging  MR-THORACIC SPINE-WITH & W/O   Final Result      1.  Dorsal kyphosis.   2.  Degenerative changes as described above.   3.  No evidence of discitis, osteomyelitis, or epidural abscess.      MR-CERVICAL SPINE-WITH & W/O   Final Result      1.  Multilevel degenerative changes most notable for mild central stenosis at C5-C6. Details for each level above in the body of report.   2.  Concerning the history of bacteremia, no evidence of discitis, osteomyelitis, epidural abscess, or epidural phlegmon.   3.  No myelopathic cord signal abnormality.      MR-LUMBAR SPINE-WITH & W/O   Final Result      1.  Postoperative changes and chronic degenerative changes as described above, most notable for L2-3 mild-moderate central stenosis. Details for each level above in the body of report.   2.  No evidence of discitis, osteomyelitis, or epidural abscess.   3.  Induration/thickening of the presacral soft tissues. Posterior pelvic inflammatory process/phlegmon would not be excluded. CT of the abdomen and pelvis with contrast may be of interest.      DX-ABDOMEN FOR TUBE PLACEMENT   Final Result         1.  Hazy left lower lobe infiltrates   2.  Small left pleural effusion   3.  Nasogastric tube tip terminates overlying the expected location of the gastric body.   4.  Cardiomegaly   5.  Atherosclerotic      CT-MAXILLOFACIAL WITH PLUS RECONS   Final Result      1.  Mild mucosal thickening noted in paranasal sinuses. No air-fluid levels.      2.  No soft tissue fluid collection or abscess identified.      3.  No bone erosion or bone destruction is identified.      CT-HEAD W/O   Final Result      1. Stable patchy  areas of decreased attenuation at the gray-white matter junctions in both cerebral hemispheres and cerebellar hemispheres when compared with the prior MRI of the brain performed on 3/28/2024.   2. No hemorrhagic transformation.   3. No other acute findings.         EC-ECHOCARDIOGRAM COMPLETE W/O CONT   Final Result      MR-BRAIN-WITH & W/O   Final Result         Multiple foci of acute infarction scattered throughout the bilateral cerebral hemispheres, cerebellum and brainstem as described above, suggesting a proximal cardiovascular embolic source.      The largest cluster of acute infarctions is noted in the right frontoparietal region involving the right MCA territory.      Age-related volume loss.      CT-CTA NECK WITH & W/O-POST PROCESSING   Final Result      No focal high-grade stenosis, dissection or occlusion of the cervical carotid or vertebral arteries.      CT-CTA HEAD WITH & W/O-POST PROCESS   Final Result      1.  Short segment decreased enhancement in the RIGHT superior sylvian division of middle superior artery, M3 segment, focal stenosis versus nonocclusive clot.   2.  No intracranial aneurysm or abrupt large vessel cut off.      CT-HEAD W/O   Final Result      1.  Hypoattenuating foci in the bilateral cerebellum which are indeterminant. They could be subacute infarcts versus other. Recommend brain MRI workup.   2.  Redemonstration of a small focus of low-attenuation in the left parietal deep and subcortical white matter. Possibly gliosis versus subacute infarct.   3.  Findings are stable since the noncontrast head CT performed one day prior.         CT-HEAD W/O   Final Result      1.  Small, rounded hypodense areas of the bilateral cerebellar hemispheres. These may represent age-indeterminate infarcts. Less likely this could represent edema related to metastatic disease.   2.  There is a small area of loss of gray-white differentiation in the left parietal lobe, possibly an age-indeterminate infarct.    3.  These findings can be further evaluated with contrast-enhanced MRI as indicated.   4.  Cerebral atrophy.   5.  White matter lucencies most consistent with      DX-CHEST-PORTABLE (1 VIEW)   Final Result      No acute findings.      EC-VINAY W/O CONT    (Results Pending)        Assessment/Plan  * Bacteremia due to Streptococcus  Assessment & Plan  Blood cultures 3/25 with strep gordonii  Repeat blood cultures 3/27 taken  Urine culture positive for E coli  Echo shows 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, cardiology consulted  VINAY when patient stable for procedure  MRI spine reviewed - no obvious abscess or osteomyelitis identified  Continue IV penicillin    Stroke (cerebrum) (HCC)  Assessment & Plan  MRI brain confirms multiple foci of acute infarction bilaterally concerning for cardioembolic strokes  Possible septic emboli from endocarditis  Patient with hx of protein S deficiency and DVT, on anticoagulation at home, currently AC is held  Neurology following      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, based on echo showing 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 in setting of positive blood cultures  MRI brain with multiple foci of acute infarcts concerning for cardioembolic stroke  On ceftriaxone  Following blood cultures  ID, neurology, cardiology following  Echo shows 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, cardiology consulted  VINAY when patient stable for procedure    Acute metabolic encephalopathy- (present on admission)  Assessment & Plan  Patient presents with confusion.  UA pertinent for UTI, likely culprit for encephalopathy.  CT head showing no acute changes  Antibiotics for UTI   S/p fluids  On 3/26 code stroke was activated, per vascular neurology they think stroke unlikely   -CTA did have some narrowing, recommended to keep blood pressure above 120  MRI brain shows multiple foci of acute infarcts across bilateral hemispheres, concerning for cardioembolic stroke  EEG without seizure activity  Echo shows mobile mass on mitral  valve, cardiology consulted  VINAY when patient stable for procedure    Moderate protein-calorie malnutrition (HCC)  Assessment & Plan  Tube feeds    Acute cystitis without hematuria  Assessment & Plan  Continue ancef  Urine culture with E coli    S/P TAVR (transcatheter aortic valve replacement)- (present on admission)  Assessment & Plan  Performed in September 2023    Protein S deficiency (HCC)- (present on admission)  Assessment & Plan  Holding home xarelto - restart after patient has had sufficient treatment for endocarditis    History of recurrent deep vein thrombosis (DVT)- (present on admission)  Assessment & Plan  On Xarelto at home  Hold AC until completion of endocarditis treatment    ACP (advance care planning)  Assessment & Plan  Prolonged discussion with patients grandson at bedside. Update addressed patients bacteremia, endocarditis, stroke and embolic phenomenon. Discussion regarding patient nutrition status and dependence on tube feeds as well as potential antibiotic course. Anticipate 4-6 weeks of IV antibiotics at a skilled nursing facility. I addressed goals of care and family continue to wish for discharge to SNF. I addressed patient neurologic recovery and stated it is relatively early after her stroke and septic emboli to prognosticate the spectrum of patients recovery. Total of 23 minutes spent in discussion and coordination of advance care planning.     Other hyperlipidemia- (present on admission)  Assessment & Plan  Lipitor    Hypertension  Assessment & Plan  Restart         VTE prophylaxis: SCDs    Greater than 52 minutes spent prepping to see patient (e.g. review of tests) obtaining and/or reviewing separately obtained history. Performing a medically appropriate examination and/ evaluation.  Counseling and educating the patient/family/caregiver.  Ordering medications, tests, or procedures.  Referring and communicating with other health care professionals.  Documenting clinical information in  EPIC.  Independently interpreting results and communicating results to patient/family/caregiver.  Care coordination.

## 2024-04-06 NOTE — CARE PLAN
The patient is Watcher - Medium risk of patient condition declining or worsening    Shift Goals  Clinical Goals: Monitor neuro status, maintain skin integrity  Patient Goals: TIM  Family Goals: TIM    Progress made toward(s) clinical / shift goals:  Q2hour turns in place. Patient is lethargic, responds to name. Left sided weakness      Patient is not progressing towards the following goals:      Problem: Knowledge Deficit - Standard  Goal: Patient and family/care givers will demonstrate understanding of plan of care, disease process/condition, diagnostic tests and medications  Outcome: Not Progressing

## 2024-04-06 NOTE — PROGRESS NOTES
Monitor summary: SR, HR 68-80, RI 0.20, QRS 0.13, QT 0.42 with (R)PVCs, (R)PACs and couplets per strip from monitor room

## 2024-04-06 NOTE — PROGRESS NOTES
Monitor summary: SR, HR 70-79, MD .20, QRS .11, QT .43 with O PVC per strip from monitor room

## 2024-04-07 LAB
ALBUMIN SERPL BCP-MCNC: 2.7 G/DL (ref 3.2–4.9)
BASOPHILS # BLD AUTO: 0.4 % (ref 0–1.8)
BASOPHILS # BLD: 0.03 K/UL (ref 0–0.12)
BUN SERPL-MCNC: 20 MG/DL (ref 8–22)
CALCIUM ALBUM COR SERPL-MCNC: 9.6 MG/DL (ref 8.5–10.5)
CALCIUM SERPL-MCNC: 8.6 MG/DL (ref 8.5–10.5)
CHLORIDE SERPL-SCNC: 94 MMOL/L (ref 96–112)
CO2 SERPL-SCNC: 25 MMOL/L (ref 20–33)
CREAT SERPL-MCNC: 0.43 MG/DL (ref 0.5–1.4)
EOSINOPHIL # BLD AUTO: 0.13 K/UL (ref 0–0.51)
EOSINOPHIL NFR BLD: 1.8 % (ref 0–6.9)
ERYTHROCYTE [DISTWIDTH] IN BLOOD BY AUTOMATED COUNT: 42.6 FL (ref 35.9–50)
GFR SERPLBLD CREATININE-BSD FMLA CKD-EPI: 94 ML/MIN/1.73 M 2
GLUCOSE SERPL-MCNC: 131 MG/DL (ref 65–99)
HCT VFR BLD AUTO: 35.7 % (ref 37–47)
HGB BLD-MCNC: 11.9 G/DL (ref 12–16)
IMM GRANULOCYTES # BLD AUTO: 0.02 K/UL (ref 0–0.11)
IMM GRANULOCYTES NFR BLD AUTO: 0.3 % (ref 0–0.9)
LYMPHOCYTES # BLD AUTO: 0.69 K/UL (ref 1–4.8)
LYMPHOCYTES NFR BLD: 9.8 % (ref 22–41)
MAGNESIUM SERPL-MCNC: 1.8 MG/DL (ref 1.5–2.5)
MCH RBC QN AUTO: 29.2 PG (ref 27–33)
MCHC RBC AUTO-ENTMCNC: 33.3 G/DL (ref 32.2–35.5)
MCV RBC AUTO: 87.7 FL (ref 81.4–97.8)
MONOCYTES # BLD AUTO: 0.92 K/UL (ref 0–0.85)
MONOCYTES NFR BLD AUTO: 13 % (ref 0–13.4)
NEUTROPHILS # BLD AUTO: 5.26 K/UL (ref 1.82–7.42)
NEUTROPHILS NFR BLD: 74.7 % (ref 44–72)
NRBC # BLD AUTO: 0 K/UL
NRBC BLD-RTO: 0 /100 WBC (ref 0–0.2)
PHOSPHATE SERPL-MCNC: 4.1 MG/DL (ref 2.5–4.5)
PLATELET # BLD AUTO: 234 K/UL (ref 164–446)
PMV BLD AUTO: 10.3 FL (ref 9–12.9)
POTASSIUM SERPL-SCNC: 4.1 MMOL/L (ref 3.6–5.5)
RBC # BLD AUTO: 4.07 M/UL (ref 4.2–5.4)
SODIUM SERPL-SCNC: 131 MMOL/L (ref 135–145)
WBC # BLD AUTO: 7.1 K/UL (ref 4.8–10.8)

## 2024-04-07 PROCEDURE — 99233 SBSQ HOSP IP/OBS HIGH 50: CPT | Mod: 25 | Performed by: INTERNAL MEDICINE

## 2024-04-07 PROCEDURE — 99233 SBSQ HOSP IP/OBS HIGH 50: CPT | Performed by: INTERNAL MEDICINE

## 2024-04-07 PROCEDURE — 770020 HCHG ROOM/CARE - TELE (206)

## 2024-04-07 PROCEDURE — 83735 ASSAY OF MAGNESIUM: CPT

## 2024-04-07 PROCEDURE — 99497 ADVNCD CARE PLAN 30 MIN: CPT | Performed by: INTERNAL MEDICINE

## 2024-04-07 PROCEDURE — 700105 HCHG RX REV CODE 258

## 2024-04-07 PROCEDURE — 700111 HCHG RX REV CODE 636 W/ 250 OVERRIDE (IP)

## 2024-04-07 PROCEDURE — 80069 RENAL FUNCTION PANEL: CPT

## 2024-04-07 PROCEDURE — 85025 COMPLETE CBC W/AUTO DIFF WBC: CPT

## 2024-04-07 PROCEDURE — A9270 NON-COVERED ITEM OR SERVICE: HCPCS | Performed by: INTERNAL MEDICINE

## 2024-04-07 PROCEDURE — 700102 HCHG RX REV CODE 250 W/ 637 OVERRIDE(OP): Performed by: INTERNAL MEDICINE

## 2024-04-07 PROCEDURE — 700111 HCHG RX REV CODE 636 W/ 250 OVERRIDE (IP): Performed by: INTERNAL MEDICINE

## 2024-04-07 PROCEDURE — 36415 COLL VENOUS BLD VENIPUNCTURE: CPT

## 2024-04-07 PROCEDURE — 700105 HCHG RX REV CODE 258: Performed by: INTERNAL MEDICINE

## 2024-04-07 RX ADMIN — SODIUM CHLORIDE 3 MILLION UNITS: 9 INJECTION, SOLUTION INTRAVENOUS at 05:53

## 2024-04-07 RX ADMIN — LOSARTAN POTASSIUM 50 MG: 50 TABLET, FILM COATED ORAL at 05:52

## 2024-04-07 RX ADMIN — SODIUM CHLORIDE 3 MILLION UNITS: 9 INJECTION, SOLUTION INTRAVENOUS at 14:35

## 2024-04-07 RX ADMIN — SODIUM CHLORIDE 3 MILLION UNITS: 9 INJECTION, SOLUTION INTRAVENOUS at 10:54

## 2024-04-07 RX ADMIN — ATORVASTATIN CALCIUM 20 MG: 20 TABLET, FILM COATED ORAL at 17:04

## 2024-04-07 RX ADMIN — SODIUM CHLORIDE 3 MILLION UNITS: 9 INJECTION, SOLUTION INTRAVENOUS at 02:30

## 2024-04-07 RX ADMIN — SODIUM CHLORIDE 3 MILLION UNITS: 9 INJECTION, SOLUTION INTRAVENOUS at 18:30

## 2024-04-07 RX ADMIN — AMLODIPINE BESYLATE 2.5 MG: 2.5 TABLET ORAL at 17:04

## 2024-04-07 RX ADMIN — SODIUM CHLORIDE 3 MILLION UNITS: 9 INJECTION, SOLUTION INTRAVENOUS at 22:52

## 2024-04-07 ASSESSMENT — FIBROSIS 4 INDEX: FIB4 SCORE: 2.48

## 2024-04-07 NOTE — CARE PLAN
Problem: Knowledge Deficit - Standard  Goal: Patient and family/care givers will demonstrate understanding of plan of care, disease process/condition, diagnostic tests and medications  Outcome: Progressing     Problem: Skin Integrity  Goal: Skin integrity is maintained or improved  Outcome: Progressing     Problem: Neuro Status  Goal: Neuro status will remain stable or improve  Outcome: Progressing   The patient is Watcher - Medium risk of patient condition declining or worsening    Shift Goals  Clinical Goals: picc line, neuro check, tube feed  Patient Goals: wilmer  Family Goals: updates    Progress made toward(s) clinical / shift goals:  updated son, picc line placement    Patient is not progressing towards the following goals:

## 2024-04-07 NOTE — PROGRESS NOTES
Infectious Disease Progress Note    Author: Carlotta Guidry M.D. Date & Time of service: 2024  7:23 AM    Chief Complaint:  Follow-up for bacteremia     Interval History:  3/30 Tmax 99.3, white count down to 11.5, tolerated switch to ceftriaxone, cultures as below, creatinine 0.39, mildly elevated transaminases,   3/31 patient is afebrile, white count down to 9.2, tolerated switch to penicillin, creatinine 0.43, mildly elevated transaminases stable, cultures as below   patient remains afebrile, count is 7.1, tolerated switch to penicillin, creatinine 0.42, mildly elevated transaminases improving now, repeat blood cultures no growth till date  4/3 patient remains afebrile, count 6.7, tolerating penicillin, remains somnolent overall, creatinine 0.38, prealbumin 5, cultures as below   Tmax 99, white count 7.4, cultures remain no growth to date, creatinine is 0.44, magnesium 1.6, going down to MRI   AF, O2 RA, more somnolent today per son.  Still very confused and only oriented x 1.    Review of Systems:  Review of Systems   Unable to perform ROS: Mental status change       Hemodynamics:  Temp (24hrs), Av.8 °C (98.3 °F), Min:36.6 °C (97.9 °F), Max:37.1 °C (98.7 °F)  Temperature: 37 °C (98.6 °F)  Pulse  Av.1  Min: 48  Max: 97   Blood Pressure : 132/72       Physical Exam:  Physical Exam  Cardiovascular:      Rate and Rhythm: Normal rate and regular rhythm.      Heart sounds: Murmur heard.   Pulmonary:      Effort: Pulmonary effort is normal.      Breath sounds: Normal breath sounds.   Abdominal:      General: Abdomen is flat. Bowel sounds are normal.      Palpations: Abdomen is soft.   Musculoskeletal:      Right lower leg: No edema.      Left lower leg: No edema.   Skin:     General: Skin is warm and dry.   Neurological:      Comments: Opened eyes but not responding         Meds:    Current Facility-Administered Medications:     acetaminophen    amLODIPine    atorvastatin    losartan     Pharmacy    penicillin g    [Held by provider] rivaroxaban    Labs:  Recent Labs     04/06/24 0107 04/07/24  0128   WBC 7.8 7.1   RBC 4.14* 4.07*   HEMOGLOBIN 12.2 11.9*   HEMATOCRIT 35.1* 35.7*   MCV 84.8 87.7   MCH 29.5 29.2   RDW 42.0 42.6   PLATELETCT 250 234   MPV 10.5 10.3   NEUTSPOLYS 73.00* 74.70*   LYMPHOCYTES 11.30* 9.80*   MONOCYTES 12.50 13.00   EOSINOPHILS 1.90 1.80   BASOPHILS 0.80 0.40     Recent Labs     04/06/24  0107 04/07/24  0128   SODIUM 134* 131*   POTASSIUM 4.1 4.1   CHLORIDE 96 94*   CO2 26 25   GLUCOSE 121* 131*   BUN 21 20     Recent Labs     04/06/24 0107 04/07/24 0128   ALBUMIN 2.6* 2.7*   CREATININE 0.52 0.43*       Imaging:  MR-LUMBAR SPINE-WITH & W/O    Result Date: 4/4/2024 4/4/2024 8:56 AM HISTORY/REASON FOR EXAM:  bacteremia. TECHNIQUE/EXAM DESCRIPTION: MRI of the lumbar spine without and with contrast. The study was performed on a [employer] Sign[employer] 1.5 Jyoti MRI scanner. T1 sagittal, T2 fast spin-echo sagittal, and T2 axial images were obtained of the lumbar spine. T1 post-contrast fat-suppressed sagittal images were obtained. Optional T2 fat-suppressed sagittal and T1 post-contrast axial images may be obtained. 18 mL ProHance gadolinium contrast was administered intravenously. COMPARISON:  MRI lumbar spine 9/26/2022 FINDINGS: Alignment in the lumbar spine shows grade 1-2 spondylolisthesis of L4 on L5. Degenerative retrolisthesis T12-L1. Mild degenerative anterolisthesis L3-L4. Again noted is postoperative change with lumbar laminectomy at L3-4 and L4-5. Interbody fusion at L4-5 with a disc space insert. Posterior fusion with transpedicular screw fixation at L3-L4-L5. Expected metallic artifact. The posterior paraspinous soft tissues show expected postoperative changes with atrophy and fatty replacement in the posterior paraspinous muscles at the operative levels. There is no significant postoperative dorsal epidural fluid collection. The prevertebral soft tissues including the psoas  muscles show mild symmetric psoas atrophy, age expected. The conus is normal in position and signal with its tip at the L2 level. There is no abnormal cord or conus enhancement. There is no abnormal intradural extra medullary enhancement. No evidence of epidural phlegmon or epidural abscess. There is mild degenerative disc space narrowing at L3-4. Advanced disc space narrowing at L5-S1. T12-L1 moderate broad-based right paramedian and right lateral disc bulging. Moderate right lateral recess stenosis. Thecal sac toward the lower range of normal without christin central stenosis. Marked right foraminal stenosis. Moderate left foraminal stenosis. At L1-2, diffuse disc bulging asymmetrically greater left paramedian. Marked left lateral recess stenosis (T2 axial image 30, series 9). Mild hypertrophic facet arthropathy. No christin central stenosis. Moderate bilateral foraminal stenosis. L2-3 mild diffuse disc bulging. Ligamentum flavum hypertrophy. Metallic artifact obscures detail but there appears to be mild to moderate central stenosis. Moderate bilateral foraminal stenosis. At L3-4, mild diffuse disc bulging. Generous thecal sac postlaminectomy. Nerve roots of the cauda equina show no clumping or evidence of arachnoiditis. Minimal impression on the ventral perineural fat due to disc bulging but no significant foraminal stenosis at this level. L4-5 mild pseudo-disc bulging. No central stenosis post laminectomy. Mild bilateral foraminal stenosis with horizontal deformity due to the spondylolisthesis. L5-S1, minimal disc bulging. No central stenosis. The left neural foramen is intact. Mild right inferior foraminal stenosis. Incidentally noted, the presacral soft tissues appear thickened, indurated with T1 hypointensity and STIR hyperintensity (T1 sagittal image 12, series 3, STIR sagittal image 10, series 6). No marrow edema signal or enhancement in the adjacent sacrum or coccyx.     1.  Postoperative changes and chronic  degenerative changes as described above, most notable for L2-3 mild-moderate central stenosis. Details for each level above in the body of report. 2.  No evidence of discitis, osteomyelitis, or epidural abscess. 3.  Induration/thickening of the presacral soft tissues. Posterior pelvic inflammatory process/phlegmon would not be excluded. CT of the abdomen and pelvis with contrast may be of interest.    MR-THORACIC SPINE-WITH & W/O    Result Date: 4/4/2024 4/4/2024 8:56 AM HISTORY/REASON FOR EXAM:  bacteremia. TECHNIQUE/EXAM DESCRIPTION: MRI of the thoracic spine without and with contrast. The study was performed on a [employer] 1.5 Jyoti MRI scanner. T1 sagittal, T2 fast spin-echo sagittal, and T2 axial images were obtained of the thoracic spine. T1 post-contrast fat suppressed sagittal images were obtained. Optional T1 post-contrast axial images may be obtained. 18 mL ProHance gadolinium contrast was administered intravenously. COMPARISON:  MRI lumbar spine from today's date FINDINGS: Alignment in the thoracic spine shows moderate dorsal kyphosis. Slight anterolisthesis of T2 on T3. Marrow signal shows chronic fatty signal discogenic endplate marrow change in the midthoracic spine at T6-T7 and T8-T9 associated with anterior marginal spurring. There is T1 and T2 hyperintensity at the T6-T7 and T8-T9 disc spaces which are narrowed/diminutive. 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 There is no evidence of discitis, osteomyelitis, or epidural abscess. There is no abnormal osseous or disc space enhancement. The thoracic spinal cord is normal in caliber and signal throughout its course. At T7-T8, there is a tiny midline ventral extradural defect consistent with a disc bulge. Thecal sac remains generous with no central stenosis. The neural foramina are intact at all thoracic levels.     1.  Dorsal kyphosis. 2.  Degenerative changes as described above. 3.  No evidence  of discitis, osteomyelitis, or epidural abscess.    MR-CERVICAL SPINE-WITH & W/O    Result Date: 4/4/2024 4/4/2024 8:52 AM HISTORY/REASON FOR EXAM:  bacteremia. Found down. Rhabdomyolysis. TECHNIQUE/EXAM DESCRIPTION: MRI of the cervical spine without and with contrast. The study was performed on a [employer] Sign[employer] 1.5 Jyoti MRI scanner. T1 sagittal, T2 fast spin-echo sagittal, and gradient echo axial images were obtained of the cervical spine. Optional T2 fat-suppressed sagittal images may also be obtained. T1 post-contrast fat suppressed sagittal images were obtained of the cervical spine. Optional T1 post-contrast axial images may be obtained. 18 mL ProHance gadolinium contrast was administered intravenously. COMPARISON: MRI cervical spine 2/16/2023 FINDINGS: Alignment in the cervical spine shows mild anterolisthesis of C4 on C5 and mild retrolisthesis of C5 on C6. Advanced disc space narrowing at C3-4 and C5-6. Mild disc space narrowing at C6-7. Variable marginal spurring. No abnormal disc space T2 hyperintensity or enhancement to suggest discitis. No marrow edema signal or abnormal osseous enhancement. The prevertebral and paraspinous soft tissues are unremarkable.. There are no anomalies at the craniovertebral junction.  The cervical spinal cord is normal in caliber and signal throughout its course. No abnormal cord enhancement. No abnormal epidural enhancement or intradural extra medullary enhancement. No evidence  of epidural abscess. At C2-3, no central stenosis. The right neural foramen is intact. Mild left foraminal stenosis. C3-4 minimal disc/osteophyte change. No central stenosis. Moderate bilateral foraminal stenosis. C4-5, no significant disc bulge or protrusion. No central stenosis. The neural foramina appear intact. C5-6 mild broad-based disc/osteophyte change. Mild central stenosis. Borderline bilateral foraminal stenosis. C6-C7, minimal disc/osteophyte change. No central stenosis. No significant  foraminal stenosis. At C7-T1, no abnormality.     1.  Multilevel degenerative changes most notable for mild central stenosis at C5-C6. Details for each level above in the body of report. 2.  Concerning the history of bacteremia, no evidence of discitis, osteomyelitis, epidural abscess, or epidural phlegmon. 3.  No myelopathic cord signal abnormality.    DX-ABDOMEN FOR TUBE PLACEMENT    Result Date: 4/2/2024 4/2/2024 9:25 PM HISTORY/REASON FOR EXAM: Nasogastric tube placement TECHNIQUE/EXAM DESCRIPTION: Single AP view of the abdomen COMPARISON:  August 29, 2017 FINDINGS: Hazy left lung base opacity is seen. Blunting of left costophrenic angle is seen Nasogastric tube is seen, the tip lies to the left of the lumbar spine.  The bowel gas pattern in the upper abdomen appears nonspecific. The bony structures appear age-appropriate.     1.  Hazy left lower lobe infiltrates 2.  Small left pleural effusion 3.  Nasogastric tube tip terminates overlying the expected location of the gastric body. 4.  Cardiomegaly 5.  Atherosclerotic    CT-MAXILLOFACIAL WITH PLUS RECONS    Result Date: 4/2/2024 4/2/2024 6:17 PM HISTORY/REASON FOR EXAM:  bacteremia, dental carries. TECHNIQUE/EXAM DESCRIPTION AND NUMBER OF VIEWS:  CT scan of the maxillofacial with contrast, with reconstructions. Thin-section helical imaging was obtained of the maxillofacial structures from the orbital roofs through the mandible. Coronal and sagittal multiplanar volume reformat images were generated from the axial data., 80 mL of Omnipaque 350 nonionic contrast was injected intravenously. Low dose optimization technique was utilized for this CT exam including automated exposure control and adjustment of the mA and/or kV according to patient size. COMPARISON:  None. FINDINGS: The bony orbits are intact.  The globes and extraocular muscles are normal in appearance. There is mild mucosal thickening in the ethmoid air cells and inferiorly in the left maxillary  sinus. No air-fluid levels are identified. No bony erosion or bone destruction is identified. The facial bones and mandible show no fractures.  The skull base shows no fractures. There are no abnormal fluid collections evident. The visualized brain and cervical spine in the field of view are unremarkable. There are no areas of abnormal contrast enhancement.     1.  Mild mucosal thickening noted in paranasal sinuses. No air-fluid levels. 2.  No soft tissue fluid collection or abscess identified. 3.  No bone erosion or bone destruction is identified.    CT-HEAD W/O    Result Date: 3/29/2024  3/29/2024 3:36 PM HISTORY/REASON FOR EXAM:  Acute change in mental status, MRI with embolic strokes. TECHNIQUE/EXAM DESCRIPTION AND NUMBER OF VIEWS: CT of the head without contrast. The study was performed on a helical multidetector CT scanner. Contiguous axial sections were obtained from the skull base through the vertex. Up to date radiation dose reduction adjustments have been utilized to meet ALARA standards for radiation dose reduction. COMPARISON:  MRI brain without and with IV contrast, 3/28/2024. FINDINGS: There is age-related central and cortical atrophy. Diffuse chronic migraines or pelvic white matter changes are evident. There are patchy areas of decreased attenuation at the gray-white matter junctions, compatible with known sites of emboli on the MRI of the brain without and with IV contrast. There is no hemorrhagic transformation. No extra-axial fluid collections. There are postsurgical changes in the globes. Paranasal sinuses, middle ear cavities and mastoid air cells are clear. Calvarium is intact. No shift in midline structures. Basilar and perimesencephalic cisterns are preserved.     1. Stable patchy areas of decreased attenuation at the gray-white matter junctions in both cerebral hemispheres and cerebellar hemispheres when compared with the prior MRI of the brain performed on 3/28/2024. 2. No hemorrhagic  transformation. 3. No other acute findings.     EC-ECHOCARDIOGRAM COMPLETE W/O CONT    Result Date: 3/29/2024  Transthoracic Echo Report Echocardiography Laboratory CONCLUSIONS Hyperdynamic left ventricular systolic function. Visually estimated ejection fraction is 70-75 %. The right ventricle is mildly dilated. Normal right ventricular systolic function. 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 Mild mitral stenosis. Mean transvalvular gradient is 4 mmHg at a heart rate of 72 BPM. Mild mitral regurgitation. 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 Right ventricular systolic pressure is estimated to be 34 mmHg (normal). Compared to the prior study on 10/23/2023, mass on mitral valve is more clearly visualized in this study.  Correlate clinically Primary team is notified of findings. SHAILEHS BYRD Exam Date:         2024                    10:21 Exam Location:     Inpatient Priority:          Stat Ordering Physician:        LINK CHURCHILL Referring Physician:       [id number] Sonographer:               Lexie Salmeron RDCS Age:    88     Gender:    F MRN:    [id number] :    1935 BSA:    1.75   Ht (in):    62     Wt (lb):    163 Exam Type:     Complete Indications:     Endocarditis ICD Codes:       421 CPT Codes:       19491 BP:   145    /   78     HR:   72 Technical Quality:       Good MEASUREMENTS  (Male / Female) Normal Values 2D ECHO LV Diastolic Diameter PLAX        4.9 cm                4.2 - 5.9 / 3.9 - 5.3 cm LV Systolic Diameter PLAX         2.7 cm                2.1 - 4.0 cm IVS Diastolic Thickness           1.1 cm                LVPW Diastolic Thickness          1.3 cm                LVOT Diameter                     1.8 cm                DOPPLER AV Peak Velocity                  2.4 m/s               AV Peak Gradient                  22.1 mmHg             AV Mean Gradient                  12 mmHg                LVOT Peak Velocity                1 m/s                 AV Area Cont Eq vti               1.4 cm2               MV Velocity Time Integral         39.8 cm               MV Pressure Half Time             77.4 ms               MV Area PHT                       2.8 cm2               TR Peak Velocity                  265 cm/s              PV Peak Velocity                  0.96 m/s              PV Peak Gradient                  3.7 mmHg              * Indicates values subject to auto-interpretation LV EF:        % FINDINGS Left Ventricle Normal left ventricular chamber size. Normal left ventricular wall thickness. Hyperdynamic left ventricular systolic function. Visually estimated ejection fraction is 70-75 %. No regional wall motion abnormalities. A reliable estimation of diastolic function cannot be made due to mitral valve disease. Right Ventricle The right ventricle is mildly dilated. Normal right ventricular systolic function. Right Atrium Normal right atrial size. Normal inferior vena cava size and inspiratory collapse. Left Atrium Moderately dilated left atrium. Left atrial volume index is 45 mL/sq m. Mitral Valve Mobile mass noted on mitral valve (posterior leaflet). Mild mitral stenosis. Mean transvalvular gradient is 4 mmHg at a heart rate of 72 BPM. Mild mitral regurgitation. Aortic Valve Known TAVR aortic valve  that is functioning normally with normal transvalvular gradients. Transvalvular gradients are - Peak: 25 mmHg,  Mean: 14 mmHg. No evidence of paravalvular leak. Tricuspid Valve Structurally normal tricuspid valve. No tricuspid stenosis. Trace tricuspid regurgitation. Right atrial pressure is estimated to be 3 mmHg. Right ventricular systolic pressure is estimated to be 34 mmHg. Pulmonic Valve The pulmonic valve is not well visualized. No pulmonic stenosis. Mild pulmonic insufficiency. Pericardium No pericardial effusion. Aorta The ascending aorta diameter is 3.3 cm. Iman Zhong MD (Electronically  Signed) Final Date:     29 March 2024                 11:55    MR-BRAIN-WITH & W/O    Result Date: 3/29/2024  3/28/2024 3:46 PM HISTORY/REASON FOR EXAM: questinable seizure Altered level of consciousness TECHNIQUE/EXAM DESCRIPTION: T1 sagittal, T2 axial, flair coronal, T1 coronal, and diffusion-weighted axial images were obtained of the brain pre-contrast followed by T1 coronal and axial images post intravenous administration of 15 mL ProHance. COMPARISON: CT brain dated 3/26/2024 FINDINGS: Multiple small foci of acute infarction are noted in the bilateral cerebellum, brainstem involving the left chris, the right midbrain posteriorly, bilateral external capsular regions, right insular region, right frontal region, left medial frontal region,  bilateral parietal cortex and subcortical region, left corpus callosum and bilateral high parietal and frontal convexity cortex and subcortical region. Intracranial flow voids are normal. Gradient-echo images do not demonstrate any evidence of hemorrhage. Postcontrast images do not demonstrate any abnormal intracranial enhancement. Age-related volume loss noted with prominent sulci, cisterns and ventricles. Ventricular dilatation is proportionate to the degree of cerebral volume loss. There is no abnormal intracranial enhancement. The Sella is within normal limits. The craniocervical junction is within normal limits. Visualized intracranial arterial flow voids are within normal limits. Bone marrow signal in the calvarium is within normal limits. Included portions of the paranasal sinuses are within normal limits. Included portions of the mastoid air cells are within normal limits. Included portions of the orbits are within normal limits     Multiple foci of acute infarction scattered throughout the bilateral cerebral hemispheres, cerebellum and brainstem as described above, suggesting a proximal cardiovascular embolic source. The largest cluster of acute infarctions is noted in  the right frontoparietal region involving the right MCA territory. Age-related volume loss.    CT-CTA NECK WITH & W/O-POST PROCESSING    Result Date: 3/26/2024  3/26/2024 4:35 PM HISTORY/REASON FOR EXAM:  code stroke LEFT-sided weakness, altered mental status. TECHNIQUE/EXAM DESCRIPTION: CT angiogram of the neck with contrast. Postcontrast images were obtained of the neck from the great vessels through the skull base following the power injection of nonionic contrast at 5.0 mL/sec. Thin-section helical images were obtained with overlapping reconstruction interval. Coronal and oblique multiplanar volume reformats were generated. Cervical internal carotid artery percent stenosis is calculated using the standard method according to the NASCET criteria wherein a segment of uniform caliber mid or distal cervical internal carotid is used as the reference denominator. 3D angiographic images were reviewed on PACS.  Maximum intensity projection (MIP) images were generated and reviewed 80 mL of Omnipaque 350 nonionic contrast was injected intravenously. Low dose optimization technique was utilized for this CT exam including automated exposure control and adjustment of the mA and/or kV according to patient size. COMPARISON:  None. FINDINGS: Aortic arch: conventional branching pattern. There is atherosclerotic plaque of the aorta. Right common carotid artery: Patent Right internal carotid artery: Atherosclerotic plaque without significant stenosis (less than 50%). Left common carotid artery is patent. Left internal carotid artery: Atherosclerotic plaque without significant stenosis (less than 50%). The right vertebral artery is patent without dissection or stenosis. The left vertebral artery is patent without dissection or stenosis.  LEFT dominant vertebral artery. Vertebrobasilar confluence: The vertebrobasilar confluence appears normal. Lung apices are clear The soft tissues of the neck are within normal limits. Multilevel  degenerative changes cervical spine. 3D angiographic/MIP images of the vasculature confirm the vascular findings as described above.     No focal high-grade stenosis, dissection or occlusion of the cervical carotid or vertebral arteries.    CT-CTA HEAD WITH & W/O-POST PROCESS    Result Date: 3/26/2024  3/26/2024 4:35 PM HISTORY/REASON FOR EXAM:  code stroke.  LEFT-sided weakness, altered mental status. TECHNIQUE/EXAM DESCRIPTION: CT angiogram of the [place] without and with contrast.  Initial precontrast images were obtained of the head from the skull base through the vertex.  Postcontrast images were obtained of the [place] following the power injection of nonionic contrast at 5.0 mL/sec. Thin-section helical images were obtained with overlapping reconstruction interval. Coronal and sagittal multiplanar volume reformats were generated.  3D angiographic images were reviewed on PACS.  Maximum intensity projection (MIP) images were generated and reviewed. 80 mL of Omnipaque 350 nonionic contrast was injected intravenously. Low dose optimization technique was utilized for this CT exam including automated exposure control and adjustment of the mA and/or kV according to patient size. COMPARISON:  CT head 3/26/2024 FINDINGS: The posterior circulation shows the distal vertebral arteries to be patent.  LEFT dominant vertebral artery.  The vertebrobasilar confluence is intact. The basilar artery is patent. No aneurysm or occlusive lesion is evident. Anterior cerebral arteries are normal in caliber and patent.  LEFT middle cerebral artery is patent.  Potential focal narrowing or nonocclusive clot in superior division RIGHT sylvian vessel, M3 segment. 3D angiographic/MIP images of the vasculature confirm the vascular findings as described above.     1.  Short segment decreased enhancement in the RIGHT superior sylvian division of middle superior artery, M3 segment, focal stenosis versus nonocclusive clot. 2.   No intracranial aneurysm or abrupt large vessel cut off.    CT-HEAD W/O    Result Date: 3/26/2024  3/26/2024 4:40 PM HISTORY/REASON FOR EXAM:  Concern for stroke. TECHNIQUE/EXAM DESCRIPTION AND NUMBER OF VIEWS: CT of the head without contrast. The study was performed on a helical multidetector CT scanner. Contiguous axial sections were obtained from the skull base through the vertex. Up to date radiation dose reduction adjustments have been utilized to meet ALARA standards for radiation dose reduction. COMPARISON:  Noncontrast head CT performed one day prior FINDINGS: Few small scattered parenchymal calcifications suggesting sequela of previous infection or inflammation. Chronic appearing bilateral basal ganglia lacunar infarcts. Hypoattenuating foci in the bilateral cerebellum which are indeterminant. They could be subacute infarcts versus other. Recommend brain MRI workup. Redemonstration of a small focus of low-attenuation in the left parietal deep and subcortical white matter. Possibly gliosis versus subacute infarct. Moderate nonspecific white matter changes. No intracranial mass effect, midline shift, hydrocephalus, hemorrhage. Bilateral maxillary sinus mucosal thickening. Left maxillary sinus mucosal retention cyst. Mastoids in the field of view are unremarkable.     1.  Hypoattenuating foci in the bilateral cerebellum which are indeterminant. They could be subacute infarcts versus other. Recommend brain MRI workup. 2.  Redemonstration of a small focus of low-attenuation in the left parietal deep and subcortical white matter. Possibly gliosis versus subacute infarct. 3.  Findings are stable since the noncontrast head CT performed one day prior.     CT-HEAD W/O    Result Date: 3/25/2024  3/25/2024 4:13 PM HISTORY/REASON FOR EXAM:  ALTERED LEVEL OF CONSCIOUSNESS. Patient found down. TECHNIQUE/EXAM DESCRIPTION AND NUMBER OF VIEWS: CT of the head without contrast. The study was performed on a Terabitz CT scanner.  Contiguous 5 mm axial sections were obtained from the skull base through the vertex. Up to date radiation dose reduction adjustments have been utilized to meet ALARA standards for radiation dose reduction. COMPARISON:  None FINDINGS: Small, rounded hypodense areas of the bilateral cerebellar hemispheres. There is a small area of loss of gray-white differentiation in the left parietal lobe. The calvariae and skull base are unremarkable. There are no extraaxial fluid collections. There is a pattern of cerebral atrophy manifest as enlargement of sulcal markings and ventricular prominence.  The ventricular system and basal cisterns are otherwise unremarkable. There are areas of hypodensity in the white matter most consistent with small vessel ischemic change versus demyelination or gliosis. There are no hemorrhagic lesions. There are no mass effects or shift of midline structures. The paranasal sinuses and mastoids in the field of view are unremarkable.     1.  Small, rounded hypodense areas of the bilateral cerebellar hemispheres. These may represent age-indeterminate infarcts. Less likely this could represent edema related to metastatic disease. 2.  There is a small area of loss of gray-white differentiation in the left parietal lobe, possibly an age-indeterminate infarct. 3.  These findings can be further evaluated with contrast-enhanced MRI as indicated. 4.  Cerebral atrophy. 5.  White matter lucencies most consistent with    DX-CHEST-PORTABLE (1 VIEW)    Result Date: 3/25/2024  3/25/2024 2:54 PM HISTORY/REASON FOR EXAM:  Sepsis; sepsis. Altered level of consciousness. TECHNIQUE/EXAM DESCRIPTION AND NUMBER OF VIEWS: Single portable view of the chest. COMPARISON: Chest radiograph, 3/20/2024. FINDINGS: Lungs: No focal opacities were evident. No pleural effusion or visible pneumothorax. Mediastinum: The descending colon post surgical changes of TAVR. Aortic annular calcifications. Calcified atherosclerotic plaques in  aorta. Other: Stable age-related degenerative changes in the spine and shoulders and diffuse decreased bone mineralization.     No acute findings.    DX-CHEST-PORTABLE (1 VIEW)    Result Date: 3/20/2024  3/20/2024 10:49 AM HISTORY/REASON FOR EXAM:  hypoxia TECHNIQUE/EXAM DESCRIPTION AND NUMBER OF VIEWS: Single portable view of the chest. COMPARISON: 9/26/2023 FINDINGS: There is TAVR hardware. HEART: Stable size. There is atherosclerotic calcification in the aortic arch. LUNGS: Lung volumes are low. There is faint LEFT basilar opacity similar to prior. PLEURA: No effusion or pneumothorax.     1.  Persistently enlarged cardiac silhouette 2.  LEFT basilar opacity likely atelectasis rather than pneumonia      Micro:  Results       Procedure Component Value Units Date/Time    BLOOD CULTURE [[id number]] Collected: 03/27/24 0428    Order Status: Completed Specimen: Blood from Peripheral Updated: 04/01/24 0500     Significant Indicator NEG     Source BLD     Site PERIPHERAL     Culture Result No growth after 5 days of incubation.    Narrative:      Left    BLOOD CULTURE [[id number]] Collected: 03/27/24 0409    Order Status: Completed Specimen: Blood from Peripheral Updated: 04/01/24 0500     Significant Indicator NEG     Source BLD     Site PERIPHERAL     Culture Result No growth after 5 days of incubation.    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  Continued on antibiotics as below    Assessment/Hospital course:  This is an 88-year-old female patient who presented after a fall for somnolence, altered mental status.  She has known protein S deficiency on Xarelto.  MRI of the brain with multiple acute infarcts, blood cultures x 2 positive for Strep gordonii, status post TAVR.  TTE with echodensity on the posterior leaflet of the native mitral valve, EKG with second-degree AV block concerning for perivalvular abscess.     Patient had a recent dental procedure about a month ago and has a temporary crown in place.     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  Recommend a 6-week IV antibiotic course, end 5/8/24   -Repeat blood cultures x 2 from 3/27 negative  -VINAY considered the patient remains quite somnolent and unclear if this will  as planned 6-week course regardless and she is not likely a surgical candidate.  Discussed with son and agreed to defer echocardiogram.  I have canceled the procedure.  -CT maxillofacial with no evidence of dental abscesses  -MRI of the whole spine with no evidence of discitis/osteomyelitis or abscess.  There was some thickening of the presacral soft tissues CT may be of interest    -Follow-up in ID  clinic near the end of antibiotic course with MD.     Disposition: Anticipate at least 6 weeks of IV antibiotics at a facility.  Okay for discharge from ID perspective.    Need for PICC line: Okay to place PICC , ordered      Discussed with Dr. Cruz.  This illness poses threat to life.  ID will sign off.

## 2024-04-07 NOTE — CARE PLAN
The patient is Stable - Low risk of patient condition declining or worsening    Shift Goals  Clinical Goals: Neuro stability; skin integrity  Patient Goals: wilmer  Family Goals: wilmer    Progress made toward(s) clinical / shift goals:      Pt is mostly non-verbal; when asked, pt denies pain; Bed low and locked; call bell and belongings in reach      Problem: Skin Integrity  Goal: Skin integrity is maintained or improved  Description: Target End Date:  Prior to discharge or change in level of care    Document interventions on Skin Risk/Edwin flowsheet groups and corresponding LDA    1.  Assess and monitor skin integrity, appearance and/or temperature  2.  Assess risk factors for impaired skin integrity and/or pressures ulcers  3.  Implement precautions to protect skin integrity in collaboration with interdisciplinary team  4.  Implement pressure ulcer prevention protocol if at risk for skin breakdown  5.  Confirm wound care consult if at risk for skin breakdown  6.  Ensure patient use of pressure relieving devices  (Low air loss bed, waffle overlay, heel protectors, ROHO cushion, etc)  Outcome: Progressing  Note: Turning pt Q2 hours; Barrier paste applied to pt bottom; wedges/TAPS in use       Patient is not progressing towards the following goals:      Problem: Knowledge Deficit - Standard  Goal: Patient and family/care givers will demonstrate understanding of plan of care, disease process/condition, diagnostic tests and medications  Description: Target End Date:  1-3 days or as soon as patient condition allows    Document in Patient Education    1.  Patient and family/caregiver oriented to unit, equipment, visitation policy and means for communicating concern  2.  Complete/review Learning Assessment  3.  Assess knowledge level of disease process/condition, treatment plan, diagnostic tests and medications  4.  Explain disease process/condition, treatment plan, diagnostic tests and medications  Outcome: Not  Progressing  Note: Pt level of mentation and/or ability to communicate is impaired

## 2024-04-07 NOTE — PROGRESS NOTES
Monitor summary: SR 67-74, SC 0.19, QRS 0.09, QT 0.42 with rare PACs and PVCs, per strip from monitor room.

## 2024-04-07 NOTE — PROGRESS NOTES
[facility] Medicine Daily Progress Note    Date of Service  4/7/2024    Chief Complaint  Marry Mathur is a 88 y.o. female admitted 3/25/2024 with altered mental status    Hospital Course  88 y.o. female who presented 3/25/2024 with altered mental status.  Per EMS, neighbors checked on patient and found her on the ground, unclear of how long she was actually down on the ground.  She was then brought to ER for evaluation.  Patient found to have UTI, rhabdomyolysis, DASHA. She was admitted for treatment of metabolic encephalopathy. Patient had acute lethargy requiring rapid response 3/26, code stroke activated. She had equivocal EEG and CT head, but MRI brain showed numerous embolic infarcts across bilateral hemispheres concerning for cardioembolic stroke. Blood cultures positive for streptococcus gordonii, urine culture with E coli. Cardiology and ID consulted who recommend VINAY. Repeat blood cultures so far negative. Plan for VINAY when patient is more alert.       Interval Problem Update  Patient was seen and examined at bedside.  No acute events overnight. Patient is resting comfortably in bed and in no acute distress. Family updated at bedside.     VINAY at cardiology discretion  MRI spine completed and reviewed   CT maxillofacial reviewed  Tube feeds  ID recs  Continue IV penicillin  Plan for PICC  SNF placement  Patient is minimally interactive on exam    I have discussed this patient's plan of care and discharge plan at IDT rounds today with Case Management, Nursing, Nursing leadership, and other members of the IDT team.    Consultants/Specialty  General Neurology   Vascular Neurology    Code Status  DNAR/DNI    Disposition  The patient is not medically cleared for discharge to home or a post-acute facility.    Patient son prefers that patient go to SNF in [place] when clear for discharge    I have placed the appropriate orders for post-discharge needs.    Review of Systems  Review of Systems   Unable to perform  ROS: Acuity of condition   All other systems reviewed and are negative.       Physical Exam  Temp:  [36.6 °C (97.9 °F)-37 °C (98.6 °F)] 36.6 °C (97.9 °F)  Pulse:  [70-86] 78  Resp:  [17-20] 17  BP: (127-133)/(68-77) 129/68  SpO2:  [95 %-97 %] 97 %    Physical Exam  Vitals and nursing note reviewed.   Constitutional:       General: She is not in acute distress.     Appearance: She is ill-appearing.   HENT:      Head: Normocephalic and atraumatic.      Right Ear: External ear normal.      Left Ear: External ear normal.      Nose: No congestion.      Comments: NG in place     Mouth/Throat:      Pharynx: Oropharynx is clear.   Eyes:      General: No scleral icterus.     Conjunctiva/sclera: Conjunctivae normal.   Cardiovascular:      Rate and Rhythm: Normal rate and regular rhythm.      Pulses: Normal pulses.   Pulmonary:      Effort: Pulmonary effort is normal.      Breath sounds: Normal breath sounds. No wheezing.   Abdominal:      General: Abdomen is flat.      Palpations: Abdomen is soft.      Tenderness: There is no abdominal tenderness. There is no guarding or rebound.   Musculoskeletal:         General: No deformity. Normal range of motion.      Cervical back: Neck supple.   Skin:     General: Skin is warm and dry.      Coloration: Skin is not jaundiced.      Findings: No bruising.   Neurological:      General: No focal deficit present.      Mental Status: She is alert.      Cranial Nerves: No cranial nerve deficit.      Motor: Weakness present.      Comments: A&O x0  Responds to commands - squeezes right hand  Minimal movement of left hand  Moves toes bilaterally   Psychiatric:         Mood and Affect: Mood is anxious.      Comments: Unable to assess       Fluids    Intake/Output Summary (Last 24 hours) at 4/7/2024 1312  Last data filed at 4/7/2024 1200  Gross per 24 hour   Intake 1320 ml   Output 1700 ml   Net -380 ml           Laboratory  Recent Labs     04/06/24  0107 04/07/24  0128   WBC 7.8 7.1   RBC 4.14*  4.07*   HEMOGLOBIN 12.2 11.9*   HEMATOCRIT 35.1* 35.7*   MCV 84.8 87.7   MCH 29.5 29.2   MCHC 34.8 33.3   RDW 42.0 42.6   PLATELETCT 250 234   MPV 10.5 10.3     Recent Labs     04/06/24  0107 04/07/24  0128   SODIUM 134* 131*   POTASSIUM 4.1 4.1   CHLORIDE 96 94*   CO2 26 25   GLUCOSE 121* 131*   BUN 21 20   CREATININE 0.52 0.43*   CALCIUM 8.8 8.6                     Imaging  MR-THORACIC SPINE-WITH & W/O   Final Result      1.  Dorsal kyphosis.   2.  Degenerative changes as described above.   3.  No evidence of discitis, osteomyelitis, or epidural abscess.      MR-CERVICAL SPINE-WITH & W/O   Final Result      1.  Multilevel degenerative changes most notable for mild central stenosis at C5-C6. Details for each level above in the body of report.   2.  Concerning the history of bacteremia, no evidence of discitis, osteomyelitis, epidural abscess, or epidural phlegmon.   3.  No myelopathic cord signal abnormality.      MR-LUMBAR SPINE-WITH & W/O   Final Result      1.  Postoperative changes and chronic degenerative changes as described above, most notable for L2-3 mild-moderate central stenosis. Details for each level above in the body of report.   2.  No evidence of discitis, osteomyelitis, or epidural abscess.   3.  Induration/thickening of the presacral soft tissues. Posterior pelvic inflammatory process/phlegmon would not be excluded. CT of the abdomen and pelvis with contrast may be of interest.      DX-ABDOMEN FOR TUBE PLACEMENT   Final Result         1.  Hazy left lower lobe infiltrates   2.  Small left pleural effusion   3.  Nasogastric tube tip terminates overlying the expected location of the gastric body.   4.  Cardiomegaly   5.  Atherosclerotic      CT-MAXILLOFACIAL WITH PLUS RECONS   Final Result      1.  Mild mucosal thickening noted in paranasal sinuses. No air-fluid levels.      2.  No soft tissue fluid collection or abscess identified.      3.  No bone erosion or bone destruction is identified.       CT-HEAD W/O   Final Result      1. Stable patchy areas of decreased attenuation at the gray-white matter junctions in both cerebral hemispheres and cerebellar hemispheres when compared with the prior MRI of the brain performed on 3/28/2024.   2. No hemorrhagic transformation.   3. No other acute findings.         EC-ECHOCARDIOGRAM COMPLETE W/O CONT   Final Result      MR-BRAIN-WITH & W/O   Final Result         Multiple foci of acute infarction scattered throughout the bilateral cerebral hemispheres, cerebellum and brainstem as described above, suggesting a proximal cardiovascular embolic source.      The largest cluster of acute infarctions is noted in the right frontoparietal region involving the right MCA territory.      Age-related volume loss.      CT-CTA NECK WITH & W/O-POST PROCESSING   Final Result      No focal high-grade stenosis, dissection or occlusion of the cervical carotid or vertebral arteries.      CT-CTA HEAD WITH & W/O-POST PROCESS   Final Result      1.  Short segment decreased enhancement in the RIGHT superior sylvian division of middle superior artery, M3 segment, focal stenosis versus nonocclusive clot.   2.  No intracranial aneurysm or abrupt large vessel cut off.      CT-HEAD W/O   Final Result      1.  Hypoattenuating foci in the bilateral cerebellum which are indeterminant. They could be subacute infarcts versus other. Recommend brain MRI workup.   2.  Redemonstration of a small focus of low-attenuation in the left parietal deep and subcortical white matter. Possibly gliosis versus subacute infarct.   3.  Findings are stable since the noncontrast head CT performed one day prior.         CT-HEAD W/O   Final Result      1.  Small, rounded hypodense areas of the bilateral cerebellar hemispheres. These may represent age-indeterminate infarcts. Less likely this could represent edema related to metastatic disease.   2.  There is a small area of loss of gray-white differentiation in the left  parietal lobe, possibly an age-indeterminate infarct.   3.  These findings can be further evaluated with contrast-enhanced MRI as indicated.   4.  Cerebral atrophy.   5.  White matter lucencies most consistent with      DX-CHEST-PORTABLE (1 VIEW)   Final Result      No acute findings.      IR-PICC LINE PLACEMENT W/ GUIDANCE > AGE 5    (Results Pending)        Assessment/Plan  * Bacteremia due to Streptococcus  Assessment & Plan  Blood cultures 3/25 with strep gordonii  Repeat blood cultures 3/27 taken  Urine culture positive for E coli  Echo shows 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, cardiology consulted  VINAY when patient stable for procedure  MRI spine reviewed - no obvious abscess or osteomyelitis identified  Continue IV antibiotics  PICC  ID recs- 6 weeks antibiotics    Stroke (cerebrum) (HCC)  Assessment & Plan  MRI brain confirms multiple foci of acute infarction bilaterally concerning for cardioembolic strokes  Possible septic emboli from endocarditis  Patient with hx of protein S deficiency and DVT, on anticoagulation at home, currently AC is held  Neurology following      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, based on echo showing 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 in setting of positive blood cultures  MRI brain with multiple foci of acute infarcts concerning for cardioembolic stroke  On ceftriaxone  Following blood cultures  ID, neurology, cardiology following  Echo shows 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, cardiology consulted  VINAY when patient stable for procedure    Acute metabolic encephalopathy- (present on admission)  Assessment & Plan  Patient presents with confusion.  UA pertinent for UTI, likely culprit for encephalopathy.  CT head showing no acute changes  Antibiotics for UTI   S/p fluids  On 3/26 code stroke was activated, per vascular neurology they think stroke unlikely   -CTA did have some narrowing, recommended to keep blood pressure above 120  MRI brain shows multiple foci of acute infarcts across  bilateral hemispheres, concerning for cardioembolic stroke  EEG without seizure activity  Echo shows 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, cardiology consulted  VINAY when patient stable for procedure    Moderate protein-calorie malnutrition (HCC)  Assessment & Plan  Tube feeds    Acute cystitis without hematuria  Assessment & Plan  Continue ancef  Urine culture with E coli    S/P TAVR (transcatheter aortic valve replacement)- (present on admission)  Assessment & Plan  Performed in September 2023    Protein S deficiency (HCC)- (present on admission)  Assessment & Plan  Holding home xarelto - restart after patient has had sufficient treatment for endocarditis    History of recurrent deep vein thrombosis (DVT)- (present on admission)  Assessment & Plan  On Xarelto at home  Hold AC until completion of endocarditis treatment    ACP (advance care planning)  Assessment & Plan  Prolonged discussion today with patients son and now grad daughter at bedside. I again attempted to answer patients sons questions regarding prognosis. Thus far out conversations have focused on patients recover and discharge to SNF for antibiotic completion. I reviewed patients MRI at bedside and personally explained imaging findings and showing MRI images to son and grand daughter. I discussed my concern regarding brain stem involvement. Total of 19 minutes spent in discussion and coordination of advance care planning.     Other hyperlipidemia- (present on admission)  Assessment & Plan  Lipitor    Hypertension  Assessment & Plan  Restart         VTE prophylaxis: SCDs    Greater than 51 minutes spent prepping to see patient (e.g. review of tests) obtaining and/or reviewing separately obtained history. Performing a medically appropriate examination and/ evaluation.  Counseling and educating the patient/family/caregiver.  Ordering medications, tests, or procedures.  Referring and communicating with other health care professionals.  Documenting clinical  information in EPIC.  Independently interpreting results and communicating results to patient/family/caregiver.  Care coordination.

## 2024-04-07 NOTE — PROGRESS NOTES
Monitor summary: SR 74-86, RI -0.19, QRS -0.09, QT -0.44, with rare PVCs per strip from the monitor room.

## 2024-04-08 ENCOUNTER — APPOINTMENT (OUTPATIENT)
Dept: RADIOLOGY | Facility: MEDICAL CENTER | Age: 89
DRG: 288 | End: 2024-04-08
Attending: INTERNAL MEDICINE
Payer: MEDICARE

## 2024-04-08 LAB
ALBUMIN SERPL BCP-MCNC: 2.5 G/DL (ref 3.2–4.9)
BASOPHILS # BLD AUTO: 0.8 % (ref 0–1.8)
BASOPHILS # BLD: 0.05 K/UL (ref 0–0.12)
BUN SERPL-MCNC: 21 MG/DL (ref 8–22)
CALCIUM ALBUM COR SERPL-MCNC: 9.6 MG/DL (ref 8.5–10.5)
CALCIUM SERPL-MCNC: 8.4 MG/DL (ref 8.5–10.5)
CHLORIDE SERPL-SCNC: 94 MMOL/L (ref 96–112)
CO2 SERPL-SCNC: 24 MMOL/L (ref 20–33)
CREAT SERPL-MCNC: 0.47 MG/DL (ref 0.5–1.4)
EOSINOPHIL # BLD AUTO: 0.12 K/UL (ref 0–0.51)
EOSINOPHIL NFR BLD: 1.8 % (ref 0–6.9)
ERYTHROCYTE [DISTWIDTH] IN BLOOD BY AUTOMATED COUNT: 42.9 FL (ref 35.9–50)
GFR SERPLBLD CREATININE-BSD FMLA CKD-EPI: 92 ML/MIN/1.73 M 2
GLUCOSE SERPL-MCNC: 124 MG/DL (ref 65–99)
HCT VFR BLD AUTO: 33.4 % (ref 37–47)
HGB BLD-MCNC: 11.3 G/DL (ref 12–16)
IMM GRANULOCYTES # BLD AUTO: 0.03 K/UL (ref 0–0.11)
IMM GRANULOCYTES NFR BLD AUTO: 0.5 % (ref 0–0.9)
LYMPHOCYTES # BLD AUTO: 0.81 K/UL (ref 1–4.8)
LYMPHOCYTES NFR BLD: 12.4 % (ref 22–41)
MAGNESIUM SERPL-MCNC: 1.8 MG/DL (ref 1.5–2.5)
MCH RBC QN AUTO: 29.1 PG (ref 27–33)
MCHC RBC AUTO-ENTMCNC: 33.8 G/DL (ref 32.2–35.5)
MCV RBC AUTO: 86.1 FL (ref 81.4–97.8)
MONOCYTES # BLD AUTO: 0.8 K/UL (ref 0–0.85)
MONOCYTES NFR BLD AUTO: 12.3 % (ref 0–13.4)
NEUTROPHILS # BLD AUTO: 4.71 K/UL (ref 1.82–7.42)
NEUTROPHILS NFR BLD: 72.2 % (ref 44–72)
NRBC # BLD AUTO: 0 K/UL
NRBC BLD-RTO: 0 /100 WBC (ref 0–0.2)
PHOSPHATE SERPL-MCNC: 3.6 MG/DL (ref 2.5–4.5)
PLATELET # BLD AUTO: 214 K/UL (ref 164–446)
PMV BLD AUTO: 10.6 FL (ref 9–12.9)
POTASSIUM SERPL-SCNC: 4 MMOL/L (ref 3.6–5.5)
RBC # BLD AUTO: 3.88 M/UL (ref 4.2–5.4)
SODIUM SERPL-SCNC: 128 MMOL/L (ref 135–145)
WBC # BLD AUTO: 6.5 K/UL (ref 4.8–10.8)

## 2024-04-08 PROCEDURE — 36573 INSJ PICC RS&I 5 YR+: CPT

## 2024-04-08 PROCEDURE — 83735 ASSAY OF MAGNESIUM: CPT

## 2024-04-08 PROCEDURE — 80069 RENAL FUNCTION PANEL: CPT

## 2024-04-08 PROCEDURE — 700111 HCHG RX REV CODE 636 W/ 250 OVERRIDE (IP): Mod: JZ | Performed by: INTERNAL MEDICINE

## 2024-04-08 PROCEDURE — 02HV33Z INSERTION OF INFUSION DEVICE INTO SUPERIOR VENA CAVA, PERCUTANEOUS APPROACH: ICD-10-PCS | Performed by: INTERNAL MEDICINE

## 2024-04-08 PROCEDURE — 85025 COMPLETE CBC W/AUTO DIFF WBC: CPT

## 2024-04-08 PROCEDURE — 97112 NEUROMUSCULAR REEDUCATION: CPT | Mod: CQ

## 2024-04-08 PROCEDURE — 770020 HCHG ROOM/CARE - TELE (206)

## 2024-04-08 PROCEDURE — 99232 SBSQ HOSP IP/OBS MODERATE 35: CPT | Performed by: INTERNAL MEDICINE

## 2024-04-08 PROCEDURE — A9270 NON-COVERED ITEM OR SERVICE: HCPCS | Performed by: INTERNAL MEDICINE

## 2024-04-08 PROCEDURE — 97530 THERAPEUTIC ACTIVITIES: CPT | Mod: CQ

## 2024-04-08 PROCEDURE — 700105 HCHG RX REV CODE 258: Performed by: INTERNAL MEDICINE

## 2024-04-08 PROCEDURE — 700102 HCHG RX REV CODE 250 W/ 637 OVERRIDE(OP): Performed by: INTERNAL MEDICINE

## 2024-04-08 PROCEDURE — 97535 SELF CARE MNGMENT TRAINING: CPT

## 2024-04-08 PROCEDURE — 36415 COLL VENOUS BLD VENIPUNCTURE: CPT

## 2024-04-08 PROCEDURE — 99221 1ST HOSP IP/OBS SF/LOW 40: CPT | Performed by: NURSE PRACTITIONER

## 2024-04-08 RX ORDER — ASPIRIN 81 MG/1
81 TABLET, CHEWABLE ORAL DAILY
Status: DISCONTINUED | OUTPATIENT
Start: 2024-04-08 | End: 2024-04-09 | Stop reason: HOSPADM

## 2024-04-08 RX ORDER — ASPIRIN 300 MG/1
300 SUPPOSITORY RECTAL DAILY
Status: DISCONTINUED | OUTPATIENT
Start: 2024-04-08 | End: 2024-04-09 | Stop reason: HOSPADM

## 2024-04-08 RX ADMIN — ATORVASTATIN CALCIUM 20 MG: 20 TABLET, FILM COATED ORAL at 16:49

## 2024-04-08 RX ADMIN — SODIUM CHLORIDE 3 MILLION UNITS: 9 INJECTION, SOLUTION INTRAVENOUS at 04:41

## 2024-04-08 RX ADMIN — SODIUM CHLORIDE 3 MILLION UNITS: 9 INJECTION, SOLUTION INTRAVENOUS at 12:11

## 2024-04-08 RX ADMIN — SODIUM CHLORIDE 3 MILLION UNITS: 9 INJECTION, SOLUTION INTRAVENOUS at 23:31

## 2024-04-08 RX ADMIN — ASPIRIN 81 MG: 81 TABLET, CHEWABLE ORAL at 14:32

## 2024-04-08 RX ADMIN — SODIUM CHLORIDE 3 MILLION UNITS: 9 INJECTION, SOLUTION INTRAVENOUS at 15:00

## 2024-04-08 RX ADMIN — AMLODIPINE BESYLATE 2.5 MG: 2.5 TABLET ORAL at 16:49

## 2024-04-08 RX ADMIN — SODIUM CHLORIDE 3 MILLION UNITS: 9 INJECTION, SOLUTION INTRAVENOUS at 06:52

## 2024-04-08 RX ADMIN — LOSARTAN POTASSIUM 50 MG: 50 TABLET, FILM COATED ORAL at 05:02

## 2024-04-08 RX ADMIN — SODIUM CHLORIDE 3 MILLION UNITS: 9 INJECTION, SOLUTION INTRAVENOUS at 18:25

## 2024-04-08 ASSESSMENT — COGNITIVE AND FUNCTIONAL STATUS - GENERAL
SUGGESTED CMS G CODE MODIFIER DAILY ACTIVITY: CM
MOBILITY SCORE: 9
WALKING IN HOSPITAL ROOM: TOTAL
TURNING FROM BACK TO SIDE WHILE IN FLAT BAD: TOTAL
DRESSING REGULAR UPPER BODY CLOTHING: TOTAL
STANDING UP FROM CHAIR USING ARMS: A LOT
CLIMB 3 TO 5 STEPS WITH RAILING: TOTAL
PERSONAL GROOMING: A LOT
DAILY ACTIVITIY SCORE: 7
MOVING FROM LYING ON BACK TO SITTING ON SIDE OF FLAT BED: A LOT
DRESSING REGULAR LOWER BODY CLOTHING: TOTAL
HELP NEEDED FOR BATHING: TOTAL
EATING MEALS: TOTAL
TOILETING: TOTAL
SUGGESTED CMS G CODE MODIFIER MOBILITY: CM
MOVING TO AND FROM BED TO CHAIR: A LOT

## 2024-04-08 ASSESSMENT — GAIT ASSESSMENTS: GAIT LEVEL OF ASSIST: UNABLE TO PARTICIPATE

## 2024-04-08 ASSESSMENT — FIBROSIS 4 INDEX: FIB4 SCORE: 2.71

## 2024-04-08 ASSESSMENT — PAIN DESCRIPTION - PAIN TYPE
TYPE: ACUTE PAIN

## 2024-04-08 NOTE — CARE PLAN
The patient is Stable - Low risk of patient condition declining or worsening    Shift Goals  Clinical Goals: neuro exam  Patient Goals: wilmer  Family Goals: updates    Progress made toward(s) clinical / shift goals:    Problem: Neuro Status  Goal: Neuro status will remain stable or improve  Outcome: Progressing     Problem: Hemodynamic Monitoring  Goal: Patient's hemodynamics, fluid balance and neurologic status will be stable or improve  Outcome: Progressing       Patient is not progressing towards the following goals:

## 2024-04-08 NOTE — PROGRESS NOTES
Hospital Medicine Daily Progress Note    Date of Service  4/8/2024    Chief Complaint  Marry Mathur is a 88 y.o. female admitted 3/25/2024 with altered mental status    Hospital Course  88 y.o. female who presented 3/25/2024 with altered mental status.  Per EMS, neighbors checked on patient and found her on the ground, unclear of how long she was actually down on the ground.  She was then brought to ER for evaluation. Patient found to have UTI, rhabdomyolysis, DASHA. She was admitted for treatment of metabolic encephalopathy. Patient had acute lethargy requiring rapid response 3/26, code stroke activated. She had equivocal EEG and CT head.  MRI brain demonstrated Multiple foci of acute infarction scattered throughout the bilateral cerebral hemispheres, cerebellum and brainstem as described above, suggesting a proximal cardiovascular embolic source, largest cluster of acute infarctions is noted in the right frontoparietal region involving the right MCA territory . Blood cultures positive for streptococcus gordonii, urine culture with E coli. Cardiology and ID consulted. VINAY demonstrated mobile mass noted on thickened mitral valve (posterior leaflet).  This finding VINAY was recommended by cardiology but patient was too lethargic and risks of procedure outweighed benefit. Repeat blood cultures negative to date.  Has had minimal improvement in cognition and is now dependent on tube feeds for nutrition.  Patient's prognosis is very poor.  Since family remain hopeful of meaningful recovery and have elected to discharge to skilled nursing facility to complete antibiotic course and receive therapy services.  Patient has history of DVT and protein S deficiency, her anticoagulation is on hold until completion of antibiotics given risk of hemorrhagic conversion in setting of septic emboli. Infectious disease finalized antibiotic recommendations, to be penicillin 3,000,000 units every 4 hours, okay for continuous  infusion with end date of 05/08.    Interval Problem Update  Patient was seen and examined at bedside.  No verbal responses to questioning, follows commands. A&O x0. We have accepting facilities and patient is medically clear for discharge. Family has appealed discharge.     Decision made to not puruse VINAY as will not alter antibiotic course  Tube feeds  ID recs  Continue IV penicillin until 05/08  PICC placed    I have discussed this patient's plan of care and discharge plan at IDT rounds today with Case Management, Nursing, Nursing leadership, and other members of the IDT team.    Consultants/Specialty  General Neurology   Vascular Neurology    Code Status  DNAR/DNI    Disposition  The patient is not medically cleared for discharge to home or a post-acute facility.    Patient son prefers that patient go to SNF in [place] when clear for discharge    I have placed the appropriate orders for post-discharge needs.    Review of Systems  Review of Systems   Unable to perform ROS: Acuity of condition   All other systems reviewed and are negative.       Physical Exam  Temp:  [36.5 °C (97.7 °F)-36.7 °C (98.1 °F)] 36.5 °C (97.7 °F)  Pulse:  [70-82] 71  Resp:  [16-18] 17  BP: (118-145)/(59-73) 128/73  SpO2:  [90 %-97 %] 96 %    Physical Exam  Vitals and nursing note reviewed.   Constitutional:       General: She is not in acute distress.     Appearance: She is ill-appearing.   HENT:      Head: Normocephalic and atraumatic.      Right Ear: External ear normal.      Left Ear: External ear normal.      Nose: No congestion.      Comments: NG in place     Mouth/Throat:      Pharynx: Oropharynx is clear.   Eyes:      General: No scleral icterus.     Conjunctiva/sclera: Conjunctivae normal.   Cardiovascular:      Rate and Rhythm: Normal rate and regular rhythm.      Pulses: Normal pulses.   Pulmonary:      Effort: Pulmonary effort is normal.      Breath sounds: Normal breath sounds. No wheezing.   Abdominal:      General: Abdomen is  flat.      Palpations: Abdomen is soft.      Tenderness: There is no abdominal tenderness. There is no guarding or rebound.   Musculoskeletal:         General: No deformity. Normal range of motion.      Cervical back: Neck supple.   Skin:     General: Skin is warm and dry.      Coloration: Skin is not jaundiced.      Findings: No bruising.   Neurological:      General: No focal deficit present.      Mental Status: She is alert.      Cranial Nerves: No cranial nerve deficit.      Motor: Weakness present.      Comments: A&O x0 - no verbal responses  Responds to commands - squeezes right hand  Minimal movement of left hand  Moves toes bilaterally  +1/5 strength in right quadriceps   Psychiatric:         Mood and Affect: Mood is anxious.      Comments: Unable to assess       Fluids    Intake/Output Summary (Last 24 hours) at 4/8/2024 1229  Last data filed at 4/8/2024 0501  Gross per 24 hour   Intake 5779.85 ml   Output 1550 ml   Net 4229.85 ml           Laboratory  Recent Labs     04/06/24  0107 04/07/24  0128 04/08/24  0258   WBC 7.8 7.1 6.5   RBC 4.14* 4.07* 3.88*   HEMOGLOBIN 12.2 11.9* 11.3*   HEMATOCRIT 35.1* 35.7* 33.4*   MCV 84.8 87.7 86.1   MCH 29.5 29.2 29.1   MCHC 34.8 33.3 33.8   RDW 42.0 42.6 42.9   PLATELETCT 250 234 214   MPV 10.5 10.3 10.6     Recent Labs     04/06/24 0107 04/07/24  0128 04/08/24  0258   SODIUM 134* 131* 128*   POTASSIUM 4.1 4.1 4.0   CHLORIDE 96 94* 94*   CO2 26 25 24   GLUCOSE 121* 131* 124*   BUN 21 20 21   CREATININE 0.52 0.43* 0.47*   CALCIUM 8.8 8.6 8.4*                     Imaging  IR-PICC LINE PLACEMENT W/ GUIDANCE > AGE 5   Final Result                  Ultrasound-guided PICC placement performed by qualified nursing staff as    above.          MR-THORACIC SPINE-WITH & W/O   Final Result      1.  Dorsal kyphosis.   2.  Degenerative changes as described above.   3.  No evidence of discitis, osteomyelitis, or epidural abscess.      MR-CERVICAL SPINE-WITH & W/O   Final Result       1.  Multilevel degenerative changes most notable for mild central stenosis at C5-C6. Details for each level above in the body of report.   2.  Concerning the history of bacteremia, no evidence of discitis, osteomyelitis, epidural abscess, or epidural phlegmon.   3.  No myelopathic cord signal abnormality.      MR-LUMBAR SPINE-WITH & W/O   Final Result      1.  Postoperative changes and chronic degenerative changes as described above, most notable for L2-3 mild-moderate central stenosis. Details for each level above in the body of report.   2.  No evidence of discitis, osteomyelitis, or epidural abscess.   3.  Induration/thickening of the presacral soft tissues. Posterior pelvic inflammatory process/phlegmon would not be excluded. CT of the abdomen and pelvis with contrast may be of interest.      DX-ABDOMEN FOR TUBE PLACEMENT   Final Result         1.  Hazy left lower lobe infiltrates   2.  Small left pleural effusion   3.  Nasogastric tube tip terminates overlying the expected location of the gastric body.   4.  Cardiomegaly   5.  Atherosclerotic      CT-MAXILLOFACIAL WITH PLUS RECONS   Final Result      1.  Mild mucosal thickening noted in paranasal sinuses. No air-fluid levels.      2.  No soft tissue fluid collection or abscess identified.      3.  No bone erosion or bone destruction is identified.      CT-HEAD W/O   Final Result      1. Stable patchy areas of decreased attenuation at the gray-white matter junctions in both cerebral hemispheres and cerebellar hemispheres when compared with the prior MRI of the brain performed on 3/28/2024.   2. No hemorrhagic transformation.   3. No other acute findings.         EC-ECHOCARDIOGRAM COMPLETE W/O CONT   Final Result      MR-BRAIN-WITH & W/O   Final Result         Multiple foci of acute infarction scattered throughout the bilateral cerebral hemispheres, cerebellum and brainstem as described above, suggesting a proximal cardiovascular embolic source.      The  largest cluster of acute infarctions is noted in the right frontoparietal region involving the right MCA territory.      Age-related volume loss.      CT-CTA NECK WITH & W/O-POST PROCESSING   Final Result      No focal high-grade stenosis, dissection or occlusion of the cervical carotid or vertebral arteries.      CT-CTA HEAD WITH & W/O-POST PROCESS   Final Result      1.  Short segment decreased enhancement in the RIGHT superior sylvian division of middle superior artery, M3 segment, focal stenosis versus nonocclusive clot.   2.  No intracranial aneurysm or abrupt large vessel cut off.      CT-HEAD W/O   Final Result      1.  Hypoattenuating foci in the bilateral cerebellum which are indeterminant. They could be subacute infarcts versus other. Recommend brain MRI workup.   2.  Redemonstration of a small focus of low-attenuation in the left parietal deep and subcortical white matter. Possibly gliosis versus subacute infarct.   3.  Findings are stable since the noncontrast head CT performed one day prior.         CT-HEAD W/O   Final Result      1.  Small, rounded hypodense areas of the bilateral cerebellar hemispheres. These may represent age-indeterminate infarcts. Less likely this could represent edema related to metastatic disease.   2.  There is a small area of loss of gray-white differentiation in the left parietal lobe, possibly an age-indeterminate infarct.   3.  These findings can be further evaluated with contrast-enhanced MRI as indicated.   4.  Cerebral atrophy.   5.  White matter lucencies most consistent with      DX-CHEST-PORTABLE (1 VIEW)   Final Result      No acute findings.           Assessment/Plan  * Bacteremia due to Streptococcus  Assessment & Plan  Blood cultures 3/25 with strep gordonii  Repeat blood cultures 3/27 taken  Urine culture positive for E coli  Echo shows 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, cardiology consulted  VINAY when patient stable for procedure  MRI spine reviewed - no obvious abscess  or osteomyelitis identified  PICC in place  ID recs- 6 weeks antibiotics with stop date of 05/08  Continue IV penicillin    Stroke (cerebrum) (HCC)  Assessment & Plan  MRI brain confirms multiple foci of acute infarction bilaterally concerning for cardioembolic strokes  Possible septic emboli from endocarditis  Patient with hx of protein S deficiency and DVT, on anticoagulation at home, currently AC is held  Neurology following  Aspirin, statin      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, based on echo showing 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 in setting of positive blood cultures  MRI brain with multiple foci of acute infarcts concerning for cardioembolic stroke  On ceftriaxone  Following blood cultures  ID, neurology, cardiology following  Echo shows 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, cardiology consulted  Decision made to forego VINAY given high risk of procedure and it will not alter patient's antibiotic course.  Continue IV penicillin for total of 6 weeks    Acute metabolic encephalopathy- (present on admission)  Assessment & Plan  Patient presents with confusion.  UA pertinent for UTI, likely culprit for encephalopathy.  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, cardiology consulted  Decision has been made to not pursue VINAY given that it will not alter patient's antibiotic course  Patient's encephalopathy is likely a sequela of her stroke and septic emboli    Moderate protein-calorie malnutrition (HCC)  Assessment & Plan  Tube feeds  Family do not want PEG    Acute cystitis without hematuria  Assessment & Plan  Continue ancef  Urine culture with E  coli    S/P TAVR (transcatheter aortic valve replacement)- (present on admission)  Assessment & Plan  Performed in September 2023    Protein S deficiency (HCC)- (present on admission)  Assessment & Plan  Holding home xarelto - restart after patient has had sufficient treatment for endocarditis    History of recurrent deep vein thrombosis (DVT)- (present on admission)  Assessment & Plan  On Xarelto at home  Patient has history of DVT and protein S deficiency, her anticoagulation is on hold until completion of antibiotics given risk of hemorrhagic conversion in setting of septic emboli    ACP (advance care planning)  Assessment & Plan  Prolonged discussion today with patients son and now grad daughter at bedside. I again attempted to answer patients sons questions regarding prognosis. Thus far out conversations have focused on patients recover and discharge to SNF for antibiotic completion. I reviewed patients MRI at bedside and personally explained imaging findings and showing MRI images to son and grand daughter. I discussed my concern regarding brain stem involvement.     04/08  Patient family have said they would not want a PEG    Other hyperlipidemia- (present on admission)  Assessment & Plan  Lipitor    Hypertension  Assessment & Plan  Restart         VTE prophylaxis: SCDs    Greater than 43 minutes spent prepping to see patient (e.g. review of tests) obtaining and/or reviewing separately obtained history. Performing a medically appropriate examination and/ evaluation.  Counseling and educating the patient/family/caregiver.  Ordering medications, tests, or procedures.  Referring and communicating with other health care professionals.  Documenting clinical information in EPIC.  Independently interpreting results and communicating results to patient/family/caregiver.  Care coordination.

## 2024-04-08 NOTE — THERAPY
Speech Language Therapy Contact Note    Patient Name: Marry Mathur  Age:  88 y.o., Sex:  female  Medical Record #: [id number]  Today's Date: 4/8/2024 04/08/24 1408   Treatment Variance   Reason For Missed Therapy Medical - Other (Please Comment)   Interdisciplinary Plan of Care Collaboration   IDT Collaboration with  Nursing   Collaboration Comments Per RN, pt is lethargic and not appropriate for dysphagia therapy at this time. SLP to follow on a later date. Of note, pt was on a full liquid diet however RN endorsed difficulty with meals and made NPO.

## 2024-04-08 NOTE — PROGRESS NOTES
Monitor summary: SR, HR 70-74, KS .20, QRS .12, QT .44 with f pvc per strip from monitor room

## 2024-04-08 NOTE — HOSPITAL COURSE
88 y.o. female with PMHx TAVR, DVT, HTN, HLD.  Patient was admitted 3/25/2024 for altered mental status.  She was found on the ground by her neighbors.  Unsure how long patient was down for.  She was found to have DASHA secondary to UTI and rhabdomyolysis.    EEG and CT head were within normal limits.  However, MRI of her brain demonstrated multiple foci of acute infarcts scattered throughout bilateral cerebral hemispheres.  Suggestive of paroxysmal cardiovascular embolic source.  Largest cluster of acute infarcts as noted in the right MCA territory.    Blood cultures are positive for Streptococcus gordonii.  Urine cultures positive for E. coli.    TTE was notable for a mobile mass on a thickened mitral valve.  VINAY was deferred due to patient's AMS and the risk of these procedure outweighing her benefits.    Overall, throughout the course of her admission she has had  minimal improvement in her mental status.  She remains on tube feeds.  Family is hopeful that she will have more recovery with a dedicated skilled nursing facility stay.    Infectious disease was consulted for bacteremia.  Recommending penicillin every 4 hours with an end date of 5/8.    Patient has history of DVT and protein S deficiency, her anticoagulation is on hold until completion of antibiotics given risk of hemorrhagic conversion in setting of septic emboli.

## 2024-04-08 NOTE — THERAPY
Occupational Therapy  Daily Treatment     Patient Name: Marry Mathur  Age:  88 y.o., Sex:  female  Medical Record #: [id number]  Today's Date: 4/8/2024     Precautions  Precautions: Fall Risk, Swallow Precautions, Nasogastric Tube  Comments: Lft side deficits    Assessment    Pt continues to make slow and limited progress with engaging in simple ADLs and functional mobility. Pt continues to require totalA with second person assist for bed mobility, toileting hygiene 2/2 incontinence, poor direction following to perform one-step ADLs, two person assist to attempt standing. Pt is minimally engaged throughout with multimodal attempts to facilitate active participation, likely due to impaired cognition and difficulty motor planning instructions despite tactile/verbal cues and hand-over-hand assist. Pt has excellent family support but requires heavy assist for all care at this time. Continue to recommend post-acute placement.     Plan    Treatment Plan Status: (P) Modify Current Treatment Plan  Type of Treatment: (P) Orthotics Treatment, Self Care / Activities of Daily Living, Adaptive Equipment, Manual Therapy Techniques, Neuro Re-Education / Balance, Therapeutic Exercises, Therapeutic Activity, Family / Caregiver Training  Treatment Frequency: (P) 2 Times per Week (decreased frequency 2/2 poor progress)  Treatment Duration: (P) Until Therapy Goals Met    DC Equipment Recommendations: (P) Unable to determine at this time  Discharge Recommendations: (P) Recommend post-acute placement for additional occupational therapy services prior to discharge home     Objective     04/08/24 0936   Vitals   Pulse Oximetry 94 %   O2 (LPM) 2   O2 Delivery Device Nasal Cannula   Pain   Intervention Declines   Non Verbal Descriptors   Non Verbal Scale  Calm   Cognition    Level of Consciousness Responds to voice   Ability To Follow Commands Unable to Follow 1 Step Commands   Safety Awareness Impaired   New Learning Impaired    Attention Impaired   Comments Delayed responses, needs repetitive multimodal cues and hand-over-hand to engage in simple ADLs   Balance   Sitting Balance (Static) Poor +   Sitting Balance (Dynamic) Trace +   Standing Balance (Static) Trace   Standing Balance (Dynamic) Dependent   Weight Shift Sitting Absent   Weight Shift Standing Absent   Skilled Intervention Facilitation;Sequencing;Tactile Cuing;Verbal Cuing;Postural Facilitation   Comments two person assist sitting EOB and attempted STS   Bed Mobility    Supine to Sit Total Assist   Sit to Supine Total Assist   Scooting Total Assist   Rolling Total Assist to Lt.;Total Assist to Rt.   Skilled Intervention Facilitation;Sequencing;Tactile Cuing;Verbal Cuing   Comments use of bed features, two person assist   Activities of Daily Living   Eating Total Assist  (NG tube)   Grooming Maximal Assist  (oral care, face wash sitting EOB)   Upper Body Dressing Total Assist  (gown)   Lower Body Dressing Total Assist  (socks)   Toileting Total Assist  (urinary incontinence)   Skilled Intervention Facilitation;Sequencing;Tactile Cuing;Verbal Cuing   Comments Multimodal cues and increased time to respond to one-step instructions to engage in ADLs   How much help from another person does the patient currently need...   Putting on and taking off regular lower body clothing? 1   Bathing (including washing, rinsing, and drying)? 1   Toileting, which includes using a toilet, bedpan, or urinal? 1   Putting on and taking off regular upper body clothing? 1   Taking care of personal grooming such as brushing teeth? 2   Eating meals? 1   6 Clicks Daily Activity Score 7   Functional Mobility   Sit to Stand Total Assist  (two person assist with use of po)   Bed, Chair, Wheelchair Transfer Unable to Participate   Toilet Transfers Unable to Participate   Mobility EOB, attempted STS   Skilled Intervention Facilitation;Postural Facilitation;Sequencing;Tactile Cuing;Verbal Cuing   Activity  Tolerance   Sitting Edge of Bed 10+ min   Standing <15 seconds   Patient / Family Goals   Patient / Family Goal #1 to get back home   Goal #1 Outcome Progressing slower than expected   Short Term Goals   Short Term Goal # 1 Pt will txfer to C with modA   Goal Outcome # 1 Progressing slower than expected   Short Term Goal # 2 Pt will complete toileting with maxA   Goal Outcome # 2 Progressing slower than expected   Short Term Goal # 3 Pt will complete gown change with modA   Goal Outcome # 3 Progressing slower than expected   Short Term Goal # 4 Pt will pt complete g/h routine sitting EOB with [place]   Goal Outcome # 4 Progressing slower than expected   Occupational Therapy Treatment Plan    O.T. Treatment Plan Modify Current Treatment Plan   Treatment Interventions Orthotics Treatment;Self Care / Activities of Daily Living;Adaptive Equipment;Manual Therapy Techniques;Neuro Re-Education / Balance;Therapeutic Exercises;Therapeutic Activity;Family / Caregiver Training   Treatment Frequency 2 Times per Week  (decreased frequency 2/2 poor progress)   Duration Until Therapy Goals Met   Anticipated Discharge Equipment and Recommendations   DC Equipment Recommendations Unable to determine at this time   Discharge Recommendations Recommend post-acute placement for additional occupational therapy services prior to discharge home   Interdisciplinary Plan of Care Collaboration   IDT Collaboration with  Nursing;Family / Caregiver;Therapy Tech   Patient Position at End of Therapy In Bed;Bed Alarm On;Call Light within Reach   Collaboration Comments RN updated; family intermittently present; therapy tech assist   Session Information   Date / Session Number  4/8 #4 (2/2, 4/8)

## 2024-04-08 NOTE — THERAPY
Physical Therapy   Daily Treatment     Patient Name: Marry Mathur  Age:  88 y.o., Sex:  female  Medical Record #: [id number]  Today's Date: 4/8/2024     Precautions  Precautions: (P) Fall Risk;Swallow Precautions;Nasogastric Tube  Comments: (P) Lft side weakness    Assessment    Pt sleeping upon entry into the room. Pt did wake up once she was mobilized, she still requires max<->total assist w/bed mobility and transfers. Initiated a stand pivot transfer from bed->cardiac chair today, taking wt through LE's to assist. The pt tolerated 10 mins EOB, initially max assist for static sit progressing to SBA/CGA.   Oral care provided removing a large amount of dried secretions from the roof of the pts mouth. The pt smiling throughout tx session, requiring initiation of all tasks. Low voice tone.  PT will cont to follow    Plan    Treatment Plan Status: (P) Continue Current Treatment Plan  Type of Treatment: Bed Mobility, Gait Training, Manual Therapy, Neuro Re-Education / Balance, Self Care / Home Evaluation, Therapeutic Activities, Therapeutic Exercise  Treatment Frequency: 5 Times per Week  Treatment Duration: Until Therapy Goals Met    DC Equipment Recommendations: (P) Unable to determine at this time  Discharge Recommendations: (P) Recommend post-acute placement for additional physical therapy services prior to discharge home    Objective       04/08/24 1140   Charge Group   PT Therapeutic Activities (Units) 3   PT Neuromuscular Re-Education / Balance (Units) 1   Total Time Spent   PT Therapeutic Activities Time Spent (Mins) 45   PT Neuromuscular Re-Education/Balance Time Spent (Mins) 10   PT Total Time Spent (Calculated) 55   Precautions   Precautions Fall Risk;Swallow Precautions;Nasogastric Tube   Comments Lft side weakness   Other Treatments   Other Treatments Provided Initiated tx session w/supine PROM bilat LE's progressing to rolling each direction x 3 for trunk engagement , following through on command  to don hao bottoms. EOB x 10 mins initially max assist progressing to SBA w/UE support. Oral care provided, removed a large amount of dried secretion from the roof of her mouth.   Balance   Sitting Balance (Static) Fair -   Sitting Balance (Dynamic) Poor -   Standing Balance (Static) Trace +   Standing Balance (Dynamic) Trace   Weight Shift Sitting Poor   Weight Shift Standing Absent   Bed Mobility    Supine to Sit Maximal Assist   Sit to Supine   (pt left seated in cardiac chair)   Scooting Total Assist   Rolling Maximum Assist to Lt.;Maximal Assist to Rt.   Skilled Intervention Verbal Cuing;Postural Facilitation;Compensatory Strategies   Comments HOB flat and use of rails   Gait Analysis   Gait Level Of Assist Unable to Participate   Functional Mobility   Sit to Stand Maximal Assist  (from EOB)   Bed, Chair, Wheelchair Transfer Maximal Assist  (bed->cardiac chair)   Transfer Method Stand Pivot   Skilled Intervention Verbal Cuing;Postural Facilitation;Compensatory Strategies;Sequencing   Comments Initiated stand pivot transfer to cardiac chair. The does assist w/standing and w/transfer.   6 Clicks Assessment - How much HELP from from another person do you currently need... (If the patient hasn't done an activity recently, how much help from another person do you think he/she would need if he/she tried?)   Turning from your back to your side while in a flat bed without using bedrails? 1   Moving from lying on your back to sitting on the side of a flat bed without using bedrails? 2   Moving to and from a bed to a chair (including a wheelchair)? 2   Standing up from a chair using your arms (e.g., wheelchair, or bedside chair)? 2   Walking in hospital room? 1   Climbing 3-5 steps with a railing? 1   6 clicks Mobility Score 9   Short Term Goals    Short Term Goal # 1 Pt will perform rolling with use of handrails and Mod A or facilitation by tx6   Goal Outcome # 1 goal not met   Short Term Goal # 2 Pt will perform side to  sit to EOB with Mod A by tx 6   Goal Outcome # 2 Goal not met   Short Term Goal # 3 Pt will maintain static sitting balance with CGA for 3 min by tx 6   Goal Outcome # 3 Goal met   Short Term Goal # 4 Pt will transfer from bed to chair with Mod A by tx 6   Goal Outcome # 4 Goal not met   Short Term Goal # 5 Pt will ambulate for 8 ft x 4 in bars with Mod A  for facilitation by tx 6   Goal Outcome # 5 Goal not met   Education Group   Role of Physical Therapist Patient Response Patient;Acceptance;Explanation;Reinforcement Needed   Physical Therapy Treatment Plan   Physical Therapy Treatment Plan Continue Current Treatment Plan   Anticipated Discharge Equipment and Recommendations   DC Equipment Recommendations Unable to determine at this time   Discharge Recommendations Recommend post-acute placement for additional physical therapy services prior to discharge home   Interdisciplinary Plan of Care Collaboration   IDT Collaboration with  Nursing;Family / Caregiver   Patient Position at End of Therapy Seated;Call Light within Reach;Family / Friend in Room   Collaboration Comments Nrsg notified of pts tx efforts   Session Information   Date / Session Number  4/8--3 (1/5, 4/11) PTA/2   Supervising Physical Therapist (PTA Treatments Only)   Supervising Physical Therapist Joan Ormonde

## 2024-04-08 NOTE — CARE PLAN
The patient is Stable - Low risk of patient condition declining or worsening    Shift Goals  Clinical Goals: Neuro stability; skin integrity  Patient Goals: wilmer  Family Goals: wilmer    Progress made toward(s) clinical / shift goals:     Problem: Skin Integrity  Goal: Skin integrity is maintained or improved  Description: Target End Date:  Prior to discharge or change in level of care    Document interventions on Skin Risk/Edwin flowsheet groups and corresponding LDA    1.  Assess and monitor skin integrity, appearance and/or temperature  2.  Assess risk factors for impaired skin integrity and/or pressures ulcers  3.  Implement precautions to protect skin integrity in collaboration with interdisciplinary team  4.  Implement pressure ulcer prevention protocol if at risk for skin breakdown  5.  Confirm wound care consult if at risk for skin breakdown  6.  Ensure patient use of pressure relieving devices  (Low air loss bed, waffle overlay, heel protectors, ROHO cushion, etc)  Outcome: Progressing     Problem: Neuro Status  Goal: Neuro status will remain stable or improve  Description: Target End Date:  Prior to discharge or change in level of care    Document on Neuro assessment in the Assessment flowsheet    1.  Assess and monitor neurologic status per provider order/protocol/unit policy  2.  Assess level of consciousness and orientation  3.  Assess for speech, dysarthria, dysphagia, facial symmetry  4.  Assess visual field, eye movements, gaze preference, pupil reaction and size  5.  Assess muscle strength and motor response in all four extremities  6.  Assess for sensation (numbness and tingling)  7.  Assess basic neuro reflexes (cough, gag, corneal)  8.  Identify changes in neuro status and report to provider for testing/treatment orders  Outcome: Progressing

## 2024-04-08 NOTE — CONSULTS
MRN: [id number]  Date of palliative consult: 24  Reason for consult: ACP  Referring provider: Dr. Cruz  Location of consult: [facility]-02  Additional consulting services: Hospital medicine 3/26, ID 3/29, cardiology 3/29, PM&R 3/30    HPI:   Marry Mathur is a 88 y.o. female with medical history significant for TAVR , colon adenocarcinoma in , HTN, HLD, prior DVT, and RA involving multiple sites brought in by EMS 3/25/2024 with ALOC after being found down by a neighbor with altered mental status.  CT imaging of head notable for bilateral cerebellar hypodense areas representing age indeterminant infarcts without intracranial findings.  Hyponatremia and altered mental status secondary to UTI.  She had a code stroke 3/26 due to worsening mentation and reported left-sided weakness.  Neurology consulted repeat head CT without acute changes, CTA of head and neck showed questionable right MCA stenosis versus occlusion.  Brain MRI 3/28 showed multiple foci and acute infarcts scattered throughout bilateral cerebral hemispheres, cerebellum, and brainstem suspected due to cardioembolic source with large cluster noted at right MCA territory.  ID consulted with positive blood cultures for strep gordonii echo noted 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.  Cardiology consulted with plan for VINAY when patient more stable.    ROS:    Review of Systems   Unable to perform ROS: Mental status change     PE:   Recent vital signs  BMI: Body mass index is 36.2 kg/m².    Temp (24hrs), Av.6 °C (97.9 °F), Min:36.5 °C (97.7 °F), Max:36.7 °C (98.1 °F)  Temperature: 36.6 °C (97.9 °F)  Pulse  Av.9  Min: 48  Max: 97   Blood Pressure : 118/59       Physical Exam  Constitutional:       Comments: Large bore NG feeding tube in place   Neurological:      Mental Status: She is lethargic.       ASSESSMENT/PLAN WITH SHARED DECISION MAKING:   PHYSICAL ASPECTS OF CARE  Palliative Performance Scale: 30-40%    # Multiple acute strokes  #  "Infective endocarditis  # Bacteremia  # UTI  # Protein S deficiency  # RA  #Recurrent DVTs  # AS s/p TAVR  # Protein calorie malnutrition    SOCIAL ASPECTS OF CARE  Patient is .  Lives alone.  Discharge planning notes with son time exploring how to get patient closer to [facility] where he lives.      SPIRITUAL ASPECTS OF CARE   Unable to assess due to lethargy.    GOALS OF CARE/SERIOUS ILLNESS CONVERSATION  Introduced myself to patient. Discussed role of palliative care and reason for consult and reason for visit.  She is unable to participate with goals of care conversation due to lethargy.  Phone call to patient's son and provided palliative care contact information.  Received phone call from patient's son to the office and returned his call about 10:45 AM.  Per recent discharge planning notes plan is for Nando to tour local SNF facilities for discharge planning given no transport has been arranged to [facility].     Code Status: DNR/DNI    ACP Documents: Healthcare co-agent Nando Mathur and Melinda Mathur with first alternate William Mathur.    I spent a total of 35 minutes reviewing medical records, direct face-to-face time with the patient and/or family, documentation and coordination of care.     Arianne \"Vannessa\" TREVON Patel, [facility]  Palliative Care Nurse Practitioner   Mon-Fri 8AM-5PM  [phone]      "

## 2024-04-08 NOTE — DISCHARGE PLANNING
Case Management Discharge Planning    Admission Date: 3/25/2024  GMLOS: 7.7  ALOS: 14    6-Clicks ADL Score: 9  6-Clicks Mobility Score: 9  PT and/or OT Eval ordered: Yes  Post-acute Referrals Ordered: Yes  Post-acute Choice Obtained: No  Has referral(s) been sent to post-acute provider:  Yes      Anticipated Discharge Dispo: Discharge Disposition: D/T to SNF with Medicare cert in anticipation of skilled care (03)    DME Needed: No    Action(s) Taken: Updated Provider/Nurse on Discharge Plan and Family Conference, spoke with Nando, patients son, patient is medically cleared. Son has nothing set up for transfer to [facility], explained patient will need to go to a local SNF. Gave list of accepting facilities. IMM signed, will dispute discharge for today, plans to visit accepting facilities today and give us his choice. Will contact SNF of choice ASAP.    Escalations Completed: Provider    Medically Clear: Yes    Next Steps: Case Management will continue to follow for discharge planning needs.    Barriers to Discharge: Pending Placement    Is the patient up for discharge tomorrow: Yes    Is transport arranged for discharge disposition: No

## 2024-04-08 NOTE — PROCEDURES
Vascular Access Team     Date of Insertion: 4/8/2024  Arm Circumference: 28  Internal length: 34  External Length: 0 hub  Vein Occupancy %: 41   Reason for PICC: abx  Labs: WBC 6.5, , BUN 21, Cr 0.47, GFR 92, INR n/a     Consents confirmed, vessel patency confirmed with ultrasound. Risks and benefits of procedure explained to family member and education regarding central line associated bloodstream infections provided. Questions answered.      PICC placed in RUE per licensed provider order with ultrasound guidance.  4 Fr, 1 lumen PICC placed in cephalic vein after 1 attempt(s). 2 mL of 1% lidocaine injected intradermally at the insertion site. A 21 gauge microintroducer needle was visualized entering the vein and modified Seldinger technique was used to obtain access to the vein. 34 cm catheter inserted and brisk blood return was observed from each lumen upon aspiration. Line secured at the 0 cm marker. TCS stylet removed and observed to be fully intact. Each lumen flushed using pulsatile method without resistance with 10 mL 0.9% normal saline. PICC line secured with Biopatch and Tegaderm.     PICC tip placement location is confirmed by nurse to be in the Superior Vena Cava (SVC) utilizing 3CG technology. PICC line is appropriate for use at this time. Patient tolerated procedure well, without complications.  Patient condition relayed to primary RN or ordering physician via this post procedure note in the EMR.      Ultrasound images uploaded to PACS and viewable in the EMR - yes  Ultrasound imaged printed and placed in paper chart - no     [employer] PICC ref # [id number], Lot # [id number], Expiration Date 04/30/2025

## 2024-04-08 NOTE — DISCHARGE PLANNING
DC Transport Scheduled    Transport Company Scheduled:  [employer]  Spoke with Carlotta at [facility] to schedule transport.    Scheduled Date: 4/9/2024  Scheduled Time: 1300    Destination: [facility]   Destination address: [place] [id number]    Notified care team of scheduled transport via [employer].     If there are any changes needed to the DC transportation scheduled, please contact [facility] at ext. [phone] between the hours of 0740-6562 Mon-Fri. If outside those hours, contact the ED Case Manager at ext. [phone].

## 2024-04-08 NOTE — PROGRESS NOTES
Monitor summary: SR 70-81, MN -0.20, QRS -0.14, QT -0.43, with a BBB and rare PVCs and rare PACs per strip from the monitor room.

## 2024-04-09 ENCOUNTER — PATIENT OUTREACH (OUTPATIENT)
Dept: SCHEDULING | Facility: IMAGING CENTER | Age: 89
End: 2024-04-09
Payer: MEDICARE

## 2024-04-09 VITALS
DIASTOLIC BLOOD PRESSURE: 93 MMHG | OXYGEN SATURATION: 95 % | RESPIRATION RATE: 18 BRPM | TEMPERATURE: 98.1 F | HEIGHT: 62 IN | HEART RATE: 80 BPM | BODY MASS INDEX: 36.51 KG/M2 | WEIGHT: 198.41 LBS | SYSTOLIC BLOOD PRESSURE: 167 MMHG

## 2024-04-09 PROCEDURE — A9270 NON-COVERED ITEM OR SERVICE: HCPCS | Performed by: INTERNAL MEDICINE

## 2024-04-09 PROCEDURE — 700105 HCHG RX REV CODE 258: Performed by: INTERNAL MEDICINE

## 2024-04-09 PROCEDURE — 700102 HCHG RX REV CODE 250 W/ 637 OVERRIDE(OP): Performed by: INTERNAL MEDICINE

## 2024-04-09 PROCEDURE — 92526 ORAL FUNCTION THERAPY: CPT

## 2024-04-09 PROCEDURE — 700111 HCHG RX REV CODE 636 W/ 250 OVERRIDE (IP): Performed by: INTERNAL MEDICINE

## 2024-04-09 PROCEDURE — 700111 HCHG RX REV CODE 636 W/ 250 OVERRIDE (IP): Mod: JG

## 2024-04-09 PROCEDURE — 99239 HOSP IP/OBS DSCHRG MGMT >30: CPT | Performed by: STUDENT IN AN ORGANIZED HEALTH CARE EDUCATION/TRAINING PROGRAM

## 2024-04-09 RX ORDER — ASPIRIN 81 MG/1
81 TABLET, CHEWABLE ORAL DAILY
Qty: 100 TABLET | Refills: 1 | Status: SHIPPED | OUTPATIENT
Start: 2024-04-10

## 2024-04-09 RX ORDER — LABETALOL HYDROCHLORIDE 5 MG/ML
10 INJECTION, SOLUTION INTRAVENOUS EVERY 4 HOURS PRN
Status: DISCONTINUED | OUTPATIENT
Start: 2024-04-09 | End: 2024-04-09 | Stop reason: HOSPADM

## 2024-04-09 RX ADMIN — LABETALOL HYDROCHLORIDE 10 MG: 5 INJECTION INTRAVENOUS at 05:00

## 2024-04-09 RX ADMIN — SODIUM CHLORIDE 3 MILLION UNITS: 9 INJECTION, SOLUTION INTRAVENOUS at 11:37

## 2024-04-09 RX ADMIN — SODIUM CHLORIDE 3 MILLION UNITS: 9 INJECTION, SOLUTION INTRAVENOUS at 07:51

## 2024-04-09 RX ADMIN — ASPIRIN 81 MG: 81 TABLET, CHEWABLE ORAL at 05:00

## 2024-04-09 RX ADMIN — SODIUM CHLORIDE 3 MILLION UNITS: 9 INJECTION, SOLUTION INTRAVENOUS at 03:36

## 2024-04-09 RX ADMIN — LOSARTAN POTASSIUM 50 MG: 50 TABLET, FILM COATED ORAL at 05:00

## 2024-04-09 ASSESSMENT — FIBROSIS 4 INDEX: FIB4 SCORE: 2.71

## 2024-04-09 ASSESSMENT — PAIN DESCRIPTION - PAIN TYPE
TYPE: ACUTE PAIN
TYPE: ACUTE PAIN

## 2024-04-09 NOTE — DISCHARGE INSTRUCTIONS
Discharge Instructions    Discharged to other by ambulance with escort. Discharged via ambulance, hospital escort: Yes.  Special equipment needed: Not Applicable    Be sure to schedule a follow-up appointment with your primary care doctor or any specialists as instructed.     Discharge Plan:   Diet Plan: Discussed  Activity Level: Discussed  Confirmed Follow up Appointment: Patient to Call and Schedule Appointment  Confirmed Symptoms Management: Discussed  Medication Reconciliation Updated: Yes  Influenza Vaccine Indication: Not indicated: Previously immunized this influenza season and > 8 years of age    I understand that a diet low in cholesterol, fat, and sodium is recommended for good health. Unless I have been given specific instructions below for another diet, I accept this instruction as my diet prescription.   Other diet: Glucerna 1.2     Special Instructions: Sepsis, Diagnosis, Adult  Sepsis is a serious bodily reaction to an infection. The infection that triggers sepsis may be from a bacteria, virus, or fungus. Sepsis can result from an infection in any part of your body. Infections that commonly lead to sepsis include skin, lung, and urinary tract infections.  Sepsis is a medical emergency that must be treated right away in a hospital. In severe cases, it can lead to septic shock. Septic shock can weaken your heart and cause your blood pressure to drop. This can cause your central nervous system and your body's organs to stop working.  What are the causes?  This condition is caused by a severe reaction to infections from bacteria, viruses, or fungus. The germs that most often lead to sepsis include:  Escherichia coli (E. coli) bacteria.  Staphylococcus aureus (staph) bacteria.  Some types of Streptococcus bacteria.  The most common infections affect these organs:  The lung (pneumonia).  The kidneys or bladder (urinary tract infection).  The skin (cellulitis).  The bowel, gallbladder, or pancreas.  What  increases the risk?  You are more likely to develop this condition if:  Your body's disease-fighting system (immune system) is weakened.  You are age 65 or older.  You are male.  You had surgery or you have been hospitalized.  You have these devices inserted into your body:  A small, thin tube (catheter).  IV line.  Breathing tube.  Drainage tube.  You are not getting enough nutrients from food (malnourished).  You have a chronic disease, such as cancer, lung disease, kidney disease, or diabetes.  What are the signs or symptoms?  Symptoms of this condition may include:  Fever.  Chills or feeling very cold.  Confusion or anxiety.  Fatigue.  Muscle aches.  Shortness of breath or rapid breathing (hyperventilation).  Nausea and vomiting.  Urinating much less than usual.  Fast heart rate.  Changes in skin color. Your skin may look blotchy, pale, or blue.  Cool, clammy, or sweaty skin.  Skin rash.  Other symptoms depend on the source of your infection.  How is this diagnosed?  This condition is diagnosed based on your symptoms, medical history, and physical exam. Other tests may also be done to find out the cause of the infection and how severe the sepsis is. Tests may include:  Blood tests.  Urine tests.  Swabs from other areas of your body that may have an infection. These samples may be tested (cultured) to find out what type of bacteria is causing the infection.  Chest X-ray to check for pneumonia. Other imaging tests, such as a CT scan, may also be done.  Lumbar puncture. This removes a small amount of the fluid that surrounds your brain and spinal cord. The fluid is then examined for infection.  How is this treated?  This condition must be treated in a hospital. Based on the cause of your infection, you may be given an antibiotic, antiviral, or antifungal medicine.  You may also receive:  Fluids through an IV.  Oxygen and breathing assistance.  Medicines to increase your blood pressure.  Kidney dialysis. This  process cleans your blood if your kidneys have failed.  Surgery to remove infected tissue.  Blood transfusion if needed.  Medicine to prevent blood clots.  Nutrients to correct imbalances in basic body function (metabolism). You may:  Receive important salts and minerals (electrolytes) through an IV.  Have your blood sugar level adjusted.  Follow these instructions at home:  Medicines    Take over-the-counter and prescription medicines only as told by your health care provider.  If you were prescribed an antibiotic, antiviral, or antifungal medicine, take it as told by your health care provider. Do not stop taking the medicine even if you start to feel better.  General instructions  If you have a catheter or other indwelling device, ask to have it removed as soon as possible.  Keep all follow-up visits. This is important.  Contact a health care provider if:  You do not feel like you are getting better or regaining strength.  You are having trouble coping with your recovery.  You frequently feel tired.  You feel worse or do not seem to get better after surgery.  You think you may have an infection after surgery.  Get help right away if:  You have any symptoms of sepsis.  You have difficulty breathing.  You have a rapid or skipping heartbeat.  You become confused or disoriented.  You have a high fever.  Your skin becomes blotchy, pale, or blue.  You have an infection that is getting worse or not getting better.  These symptoms may represent a serious problem that is an emergency. Do not wait to see if the symptoms will go away. Get medical help right away. Call your local emergency services (911 in the [place] Do not drive yourself to the hospital.  Summary  Sepsis is a medical emergency that requires immediate treatment in a hospital.  This condition is caused by a severe reaction to infections from bacteria, viruses, or fungus.  Based on the cause of your infection, you may be given an antibiotic, antiviral, or  antifungal medicine.  Treatment may also include IV fluids, breathing assistance, and kidney dialysis.  This information is not intended to replace advice given to you by your health care provider. Make sure you discuss any questions you have with your health care provider.  Document Revised: 11/01/2021 Document Reviewed: 11/01/2021  [employer] Patient Education © 2023 [employer]    -Is this patient being discharged with medication to prevent blood clots?  Yes, Aspirin Aspirin Tablets  What is this medication?  ASPIRIN (AS pir in) lowers the risk of heart attack, stroke, or blood clot. It may also be used to treat mild to moderate pain, inflammation, or arthritis. It belongs to a group of medications called NSAIDs.  This medicine may be used for other purposes; ask your health care provider or pharmacist if you have questions.  COMMON BRAND NAME(S): Aspir-Low, Aspir-Wen, Aspirtab, Madelyn Advanced Aspirin, Madelyn Aspirin, Madelyn Aspirin Extra Strength, Madelyn Aspirin Plus, Madelyn Extra Strength, Madelyn Extra Strength Plus, Madelyn Genuine Aspirin, Madelyn Womens Aspirin, Bufferin, Bufferin Extra Strength, Bufferin Low Dose  What should I tell my care team before I take this medication?  They need to know if you have any of these conditions:  Anemia  Asthma  Bleeding problems  Diabetes  Gout  History of stomach ulcers or bleeding  If you often drink alcohol  Kidney disease  Liver disease  Low level of vitamin K  Lupus  Smoke tobacco  An unusual or allergic reaction to aspirin, tartrazine dye, other medications, dyes, or preservatives  Pregnant or trying to get pregnant  Breast-feeding  How should I use this medication?  Take this medication by mouth with a glass of water. Follow the directions on the package or prescription label. You can take this medication with or without food. If it upsets your stomach, take it with food. Do not take it more often than directed.  Talk to your care team about the use of this medication in  children. While this medication may be prescribed for children as young as 12 years of age for selected conditions, precautions do apply. Children and teenagers should not use this medication to treat chicken pox or flu symptoms unless directed by a care team.  Patients over 65 years old may have a stronger reaction and need a smaller dose.  Overdosage: If you think you have taken too much of this medicine contact a poison control center or emergency room at once.  NOTE: This medicine is only for you. Do not share this medicine with others.  What if I miss a dose?  If you are taking this medication on a regular schedule and miss a dose, take it as soon as you can. If it is almost time for your next dose, take only that dose. Do not take double or extra doses.  What may interact with this medication?  Do not take this medication with any of the following:  Cidofovir  Ketorolac  Probenecid  This medication may also interact with the following:  Alcohol  Alendronate  Bismuth subsalicylate  Flavocoxid  Herbal supplements like feverfew, garlic, shaun, ginkgo biloba, horse chestnut  Medications for diabetes or glaucoma like acetazolamide, methazolamide  Medications for gout  Medications that prevent or treat blood clots like apixaban, clopidogrel, enoxaparin, heparin, rivaroxaban, warfarin  Other aspirin and aspirin-like medications  NSAIDs, medications for pain and inflammation, like ibuprofen or naproxen  Pemetrexed  Sulfinpyrazone  Varicella live vaccine  This list may not describe all possible interactions. Give your health care provider a list of all the medicines, herbs, non-prescription drugs, or dietary supplements you use. Also tell them if you smoke, drink alcohol, or use illegal drugs. Some items may interact with your medicine.  What should I watch for while using this medication?  If you are treating yourself for pain, tell your doctor or health care provider if the pain lasts more than 10 days, if it gets  worse, or if there is a new or different kind of pain. Tell your doctor if you see redness or swelling. Also, check with your doctor if you have a fever that lasts for more than 3 days. Only take this medication to prevent heart attacks or blood clotting if prescribed by your doctor or health care provider.  Do not take other medications that contain aspirin, ibuprofen, or naproxen with this medication. Side effects such as stomach upset, nausea, or ulcers may be more likely to occur. Many non-prescription medications contain aspirin, ibuprofen, or naproxen. Always read labels carefully.  This medication can cause serious ulcers and bleeding in the stomach. It can happen with no warning. Smoking, drinking alcohol, older age, and poor health can also increase risks. Call your health care provider right away if you have stomach pain or blood in your vomit or stool.  Alcohol may interfere with the effect of this medication. Avoid alcoholic drinks.  This medication may cause serious skin reactions. They can happen weeks to months after starting the medication. Contact your health care provider right away if you notice fevers or flu-like symptoms with a rash. The rash may be red or purple and then turn into blisters or peeling of the skin. Or, you might notice a red rash with swelling of the face, lips or lymph nodes in your neck or under your arms.  Talk to your health care provider if you are pregnant before taking this medication. Taking this medication between weeks 20 and 30 of pregnancy may harm your unborn baby. Your health care provider will monitor you closely if you need to take it. After 30 weeks of pregnancy, do not take this medication.  Be careful brushing or flossing your teeth or using a toothpick because you may get an infection or bleed more easily. If you have any dental work done, tell your dentist you are receiving this medication.  This medication may make it more difficult to get pregnant. Talk to  your health care provider if you are concerned about your fertility.  What side effects may I notice from receiving this medication?  Side effects that you should report to your care team as soon as possible:  Allergic reactions--skin rash, itching, hives, swelling of the face, lips, tongue, or throat  Bleeding--bloody or black, tar-like stools, vomiting blood or brown material that looks like coffee grounds, red or dark brown urine, red or purple spots on skin, unusual bruising or bleeding  Hearing loss, ringing in ears  Kidney injury--decrease in the amount of urine, swelling of the ankles, hands, or feet  Liver injury--right upper belly pain, loss of appetite, nausea, light-colored stool, dark yellow or brown urine, yellowing of the skin or eyes, unusual weakness, fatigue  Rash, fever, and swollen lymph nodes  Side effects that usually do not require medical attention (report to your care team if they continue or are bothersome):  Headache  Loss of appetite  Nausea  Upset stomach  This list may not describe all possible side effects. Call your doctor for medical advice about side effects. You may report side effects to FDA at 9-059-FDA-9182.  Where should I keep my medication?  Keep out of the reach of children and pets.  Store at room temperature between 15 and 30 degrees C (59 and 86 degrees F). Protect from heat and moisture. Get rid of any unused medication after the expiration date.  Do not use this medication if it has a strong vinegar smell.  To get rid of medications that are no longer needed or have :  Take the medication to a medication take-back program. Check with your pharmacy or law enforcement to find a location.  If you cannot return the medication, check the label or package insert to see if the medication should be thrown out in the garbage or flushed down the toilet. If you are not sure, ask your care team. If it is safe to put it in the trash, empty the medication out of the container.  Mix the medication with cat litter, dirt, coffee grounds, or other unwanted substance. Seal the mixture in a bag or container. Put it in the trash.  NOTE: This sheet is a summary. It may not cover all possible information. If you have questions about this medicine, talk to your doctor, pharmacist, or health care provider.  © 2023 [employer] (2021-10-29 00:00:00)    Is patient discharged on Warfarin / Coumadin?   No   Bacteremia, Adult  Bacteremia is the presence of bacteria in the blood. When bacteria enter the bloodstream, they can cause a life-threatening reaction called sepsis. Sepsis is a medical emergency.  What are the causes?  This condition is caused by bacteria that get into the blood. Bacteria can enter the blood from an infection, including:  A skin infection or injury, such as a burn or a cut.  A lung infection (pneumonia).  An infection in the stomach or intestines.  An infection in the bladder or urinary system (urinary tract infection).  A bacterial infection in another part of the body that spreads to the blood.  Bacteria can also enter the blood during a dental or medical procedure, from bleeding gums, or through use of an unclean needle.  What increases the risk?  This condition is more likely to develop in children, older adults, and people who have:  A long-term (chronic) disease or condition like diabetes or chronic kidney failure.  An artificial joint or heart valve, or heart valve disease.  A tube inserted to treat a medical condition, such as a urinary catheter or IV.  A weak disease-fighting system (immune system).  Injected illegal drugs.  Been hospitalized for more than 10 days in a row.  What are the signs or symptoms?  Symptoms of this condition include:  Fever and chills.  Fast heartbeat and shortness of breath.  Dizziness, weakness, and low blood pressure.  Confusion or anxiety.  Pain in the abdomen, nausea, vomiting, and diarrhea.  Bacteremia that has spread to other parts  of the body may cause symptoms in those areas. In some cases, there are no symptoms.  How is this diagnosed?  This condition may be diagnosed with a physical exam and tests, such as:  Blood tests to check for bacteria or other signs of infection.  Tests of any tubes that you have had inserted. These tests check for a source of infection.  Urine tests to check for bacteria in the urine.  Imaging tests, such as an X-ray, a CT scan, an MRI, or a heart ultrasound. These check for a source of infection in other parts of your body, such as your lungs, heart valves, or joints.  How is this treated?  This condition is usually treated in the hospital. If you are treated at home, you may need to return to the hospital for medicines, blood tests, and evaluation. Treatment may include:  Antibiotic medicines. These may be given by mouth (orally) or directly into your blood through an IV. You may need antibiotics for several weeks. At first, you may be given an antibiotic to kill most types of blood bacteria. If tests show that a certain kind of bacteria is causing the problem, you may be given a different antibiotic.  IV fluids.  Removing any catheter or device that could be a source of infection.  Blood pressure and breathing support, if needed.  Surgery to control the source or the spread of infection, such as surgery to remove an implanted device, abscess, or infected tissue.  Follow these instructions at home:  Medicines  Take over-the-counter and prescription medicines only as told by your health care provider.  If you were prescribed an antibiotic medicine, take it as told by your health care provider. Do not stop taking the antibiotic even if you start to feel better.  General instructions    Rest as told by your health care provider.  Return to your normal activities as told by your health care provider. Ask your health care provider what activities are safe for you.  Drink enough fluid to keep your urine pale  yellow.  Do not use any products that contain nicotine or tobacco. These products include cigarettes, chewing tobacco, and vaping devices, such as e-cigarettes. If you need help quitting, ask your health care provider.  Keep all follow-up visits. This is important.  How is this prevented?    Get vaccines as recommended by your health care provider.  Take good care of your skin. This includes:  Cleaning and covering any scrapes or cuts.  Bathing regularly and moisturizing your skin often.  Wash your hands regularly with soap and water for at least 20 seconds. If soap and water are not available, use hand .  You should wash your hands:  After using the toilet or changing a diaper.  Before preparing, cooking, serving, or eating food.  While caring for a sick person or while visiting someone in a hospital.  Before and after changing bandages (dressings) over wounds.  Practice good mouth care (oral hygiene). Brush your teeth two times a day, and floss regularly.  Contact a health care provider if:  Your symptoms get worse, and medicines do not help.  You have severe pain.  Get help right away if you have:  Chest pain or trouble breathing.  A fever or chills.  A fast heart rate.  Skin that is blotchy, pale, or clammy.  Confusion.  Weakness, a lack of energy, or unusual sleepiness.  New symptoms that develop after treatment has started.  These symptoms may represent a serious problem that is an emergency. Do not wait to see if the symptoms will go away. Get medical help right away. Call your local emergency services (911 in the [place] Do not drive yourself to the hospital.  Summary  Bacteremia is the presence of bacteria in the blood. When bacteria enter the bloodstream, they can cause a life-threatening reaction called sepsis.  Bacteremia is usually treated with antibiotic medicines in the hospital.  If you were prescribed an antibiotic medicine, take it as told by your health care provider. Do not stop taking  the antibiotic even if you start to feel better.  Get help right away if you have any new symptoms that develop after treatment has started.  This information is not intended to replace advice given to you by your health care provider. Make sure you discuss any questions you have with your health care provider.  Document Revised: 03/21/2022 Document Reviewed: 03/21/2022  [employer] Patient Education © 2023 [employer]

## 2024-04-09 NOTE — PROGRESS NOTES
Monitor Summary: SR [id number], MD 0.24, QRS 0.08, QT 0.40, with frequent PVCs and PACs per strip from monitor room.

## 2024-04-09 NOTE — DISCHARGE SUMMARY
Discharge Summary    CHIEF COMPLAINT ON ADMISSION  Chief Complaint   Patient presents with    ALOC     Patient found down by neighbors, last seen per EMS was 12-24 hours ago, patient confused, but states she did not fall, states she was going to BR and lie down on ground, reported diarrhea.         Reason for Admission  Acute metabolic encephalopathy    Admission Date  3/25/2024     CODE STATUS  DNAR/DNI    HPI & HOSPITAL COURSE    88 y.o. female with PMHx TAVR, DVT, HTN, HLD.  Patient was admitted 3/25/2024 for altered mental status.  She was found on the ground by her neighbors.  Unsure how long patient was down for.  She was found to have DASHA secondary to UTI and rhabdomyolysis.    EEG and CT head were within normal limits.  However, MRI of her brain demonstrated multiple foci of acute infarcts scattered throughout bilateral cerebral hemispheres.  Suggestive of paroxysmal cardiovascular embolic source.  Largest cluster of acute infarcts as noted in the right MCA territory.    Blood cultures are positive for Streptococcus gordonii.  Urine cultures positive for E. coli.    TTE was notable for a mobile mass on a thickened mitral valve.  VINAY was deferred due to patient's AMS and the risk of these procedure outweighing her benefits.    Overall, throughout the course of her admission she has had  minimal improvement in her mental status.  She remains on tube feeds.  Family is hopeful that she will have more recovery with a dedicated skilled nursing facility stay.    Infectious disease was consulted for bacteremia.  Recommending penicillin every 4 hours with an end date of 5/8.    Patient has history of DVT and protein S deficiency, her anticoagulation is on hold until completion of antibiotics given risk of hemorrhagic conversion in setting of septic emboli.     Therefore, she is discharged in fair and stable condition to skilled nursing facility.    The patient met 2-midnight criteria for an inpatient stay at the time  of discharge.    Discharge date: 4/9/2024    FOLLOW UP ITEMS POST DISCHARGE  [facility]  Follow-up with primary care in 1 week    DISCHARGE DIAGNOSES  Principal Problem:    Bacteremia due to Streptococcus (POA: Unknown)  Active Problems:    Hypertension (POA: Unknown)    Other hyperlipidemia (POA: Yes)    ACP (advance care planning) (POA: Unknown)      Overview: IMO load March 2020    History of recurrent deep vein thrombosis (DVT) (POA: Yes)    Protein S deficiency (HCC) (POA: Yes)    S/P TAVR (transcatheter aortic valve replacement) (POA: Yes)      Overview:  26 mm S3 Ultra Resilia Garza bovine pericardial valve, -1 mL in balloon       on 9/25/2023 under general anesthesia    Acute metabolic encephalopathy (POA: Yes)    Acute cystitis without hematuria (POA: Unknown)    Endocarditis (POA: Unknown)    Stroke (cerebrum) (HCC) (POA: Unknown)    Moderate protein-calorie malnutrition (HCC) (POA: Unknown)  Resolved Problems:    * No resolved hospital problems. *      FOLLOW UP  Future Appointments   Date Time Provider Department Center   7/22/2024  [facility]   9/13/2024 11:30 AM [place] [facility]   9/23/2024  1:15 PM V EXAM 9 E[facility]   12/30/2024  1:00 PM Deanna Escoto M.D. [facility] None     [address] [address]  [phone]          MEDICATIONS ON DISCHARGE     Medication List        START taking these medications        Instructions   aspirin 81 MG Chew chewable tablet  Start taking on: April 10, 2024  Commonly known as: Asa   Chew 1 Tablet every day.  Dose: 81 mg     NS SOLN 100 mL with penicillin G potassium [id number] UNIT SOLR 3 Million Units   Infuse 3 Million Units into a venous catheter every 4 hours.  Dose: 3 Million Units            CONTINUE taking these medications        Instructions   acetaminophen 500 MG Tabs  Commonly known as: Tylenol   Take 750 mg by mouth  2 times a day as needed for Mild Pain. 1.5 in am, 1 tab at noon, 2 in pm  Indications: Pain  Dose: 750 mg     amLODIPine 2.5 MG Tabs  Commonly known as: Norvasc   Take 2.5 mg by mouth every evening.  Dose: 2.5 mg     atorvastatin 20 MG Tabs  Commonly known as: Lipitor   Take 1 Tablet by mouth every evening.  Dose: 20 mg     losartan 50 MG Tabs  Commonly known as: Cozaar   Doctor's comments: Needs an appt for further refills  TAKE 1 TABLET BY MOUTH EVERY DAY  Dose: 50 mg            STOP taking these medications      Calcium Carbonate 600 MG Tabs     diphenhydrAMINE-APAP (sleep)  MG Tabs     gabapentin 300 MG Caps  Commonly known as: Neurontin     lidocaine 5 % Ptch  Commonly known as: Lidoderm     loperamide 2 MG Caps  Commonly known as: Imodium     MAGNESIUM PO     Melatonin 10 MG Tabs     omeprazole 20 MG delayed-release capsule  Commonly known as: PriLOSEC     PRESERVISION AREDS 2 PO     rivaroxaban 20 MG Tabs tablet  Commonly known as: Xarelto     Sodium Fluoride 5000 Plus 1.1 % Crea  Generic drug: SODIUM FLUORIDE (DENTAL GEL)     vitamin D 1000 Unit (25 mcg) Tabs  Commonly known as: cholecalciferol              Allergies  Allergies   Allergen Reactions    Wound Dressing Adhesive      Pt says band-aids cause skin reaction/rash if on for more than 2 days  Paper tape OK         DIET  Orders Placed This Encounter   Procedures    Diet: Diet Tube Feed; Formula: Glucerna; Glucerna: Glucerna RTH; Goal Rate (mL/Hour): 60; Duration: 24 HR     Start at 25mL/hr and advance per protocol to goal rate     Standing Status:   Standing     Number of Occurrences:   1     Order Specific Question:   Diet     Answer:   Diet Tube Feed [35]     Order Specific Question:   Formula:     Answer:   Glucerna     Order Specific Question:   Glucerna:     Answer:   Glucerna RTH     Order Specific Question:   Goal Rate (mL/Hour)     Answer:   60     Order Specific Question:   Route     Answer:   Enteral Tube     Order Specific Question:    Duration     Answer:   24 HR    Diet NPO Restrict to: With Tube Feed     Standing Status:   Standing     Number of Occurrences:   1     Order Specific Question:   Diet NPO Restrict to:     Answer:   With Tube Feed [4]       ACTIVITY  As tolerated and directed by skilled nursing.  Weight bearing as tolerated    LINES, DRAINS, AND WOUNDS  This is an automated list. Peripheral IVs will be removed prior to discharge.  PICC Single Lumen 04/08/24 Right Cephalic (Active)   Site Assessment Clean;Dry;Intact 04/08/24 2000   Line Status Blood return noted;Flushed;Saline locked 04/08/24 2000   Line Secured at (cm) 0 cm 04/08/24 0815   Extremity Circumference (cm) 28 cm 04/08/24 0815   Dressing Type Biopatch;Skin barrier;Transparent;Securing device 04/08/24 2000   Dressing Status Clean;Dry;Intact 04/08/24 2000   Dressing Intervention N/A 04/08/24 2000   Dressing Change Due 04/13/24 04/08/24 2000   Line Necessity Assessed Antibiotic Therapy Greater than 7 Days 04/08/24 2000   $ Single Lumen PICC Charge Single kit used 04/08/24 0815     External Urinary Catheter (Female Wick) (Active)   Collection Container Suction container 04/08/24 2000   Output (mL) 1200 mL 04/09/24 0026      Wound 03/27/24 Skin Tear Buttocks Midline;Outer Bilateral Blisters (Active)   Wound Image   03/27/24 1800   Site Assessment Clean;[place] 04/08/24 0800   Periwound Assessment Intact 04/08/24 0800   Margins Attached edges;Defined edges 04/08/24 0800   Closure Open to air 04/08/24 0800   Drainage Amount None 04/08/24 0800   Treatments Offloading 04/08/24 2000   Offloading/DME Heel float boot 04/07/24 0810   Periwound Protectant Barrier Paste 04/08/24 0800   Dressing Status Open to Air 04/08/24 2000      PICC Single Lumen 04/08/24 Right Cephalic (Active)   Site Assessment Clean;Dry;Intact 04/08/24 2000   Line Status Blood return noted;Flushed;Saline locked 04/08/24 2000   Line Secured at (cm) 0 cm 04/08/24 0815   Extremity Circumference (cm) 28 cm 04/08/24 0815    Dressing Type Biopatch;Skin barrier;Transparent;Securing device 04/08/24 2000   Dressing Status Clean;Dry;Intact 04/08/24 2000   Dressing Intervention N/A 04/08/24 2000   Dressing Change Due 04/13/24 04/08/24 2000   Line Necessity Assessed Antibiotic Therapy Greater than 7 Days 04/08/24 2000   $ Single Lumen PICC Charge Single kit used 04/08/24 0815                MENTAL STATUS ON TRANSFER  Patient is at her new baseline mental status       CONSULTATIONS  Neurology  Cardiology  Infectious disease  Palliative care  Physiatry    PROCEDURES  PICC line placed 4/8  EEG 3/20    LABORATORY  Lab Results   Component Value Date    SODIUM 128 (L) 04/08/2024    POTASSIUM 4.0 04/08/2024    CHLORIDE 94 (L) 04/08/2024    CO2 24 04/08/2024    GLUCOSE 124 (H) 04/08/2024    BUN 21 04/08/2024    CREATININE 0.47 (L) 04/08/2024        Lab Results   Component Value Date    WBC 6.5 04/08/2024    HEMOGLOBIN 11.3 (L) 04/08/2024    HEMATOCRIT 33.4 (L) 04/08/2024    PLATELETCT 214 04/08/2024        Total time of the discharge process exceeds 38 minutes.

## 2024-04-09 NOTE — PROGRESS NOTES
Monitor Summary: SR 65-79 TN 0.109 QRS 0.10 QT 0.40 with frequent PVCs and rare PACs per strip from monitor room.

## 2024-04-09 NOTE — CARE PLAN
The patient is Watcher - Medium risk of patient condition declining or worsening    Shift Goals  Clinical Goals: skin integrity, safety, neuro checks  Patient Goals: TIM  Family Goals: updates    Progress made toward(s) clinical / shift goals:  Turned patient Q2, safety precautions in place, neuro checked Q4 with no changes. Patient does follow commands.    Problem: Knowledge Deficit - Standard  Goal: Patient and family/care givers will demonstrate understanding of plan of care, disease process/condition, diagnostic tests and medications  Outcome: Progressing     Problem: Fall Risk  Goal: Patient will remain free from falls  Outcome: Progressing     Problem: Pain - Standard  Goal: Alleviation of pain or a reduction in pain to the patient’s comfort goal  Outcome: Progressing     Problem: Skin Integrity  Goal: Skin integrity is maintained or improved  Outcome: Progressing

## 2024-04-09 NOTE — PROGRESS NOTES
Pt and family given discharge instructions and education. PICC line is to remain in place for antibiotics which will end 5/8/2024. Pt's family has all of her belongings, NG tube is to remain in place as well. Pt escorted [facility] by [facility] via ralf.

## 2024-04-09 NOTE — THERAPY
"Speech Language Pathology   Daily Treatment     Patient Name: Marry aMthur  AGE:  88 y.o., SEX:  female  Medical Record #: [id number]  Date of Service: 4/9/2024      Precautions:  Precautions: Fall Risk, Swallow Precautions, Nasogastric Tube         Subjective  Family at bedside for session. They endorsed that pt has had intermittent bouts of wakefulness and has been awake for about an hour so far. Pt with no verbal output during session but nodding head yes/no. Family has been NPO with NG for nutrition.       Assessment  Pt seen on this date for dysphagia therapy. Intermittent coughing noted with ice chips and thin liquids via teaspoon which is concerning for airway invasion. Appropriate bolus containment with all trials. Pt with slightly prolonged but functional mastication/manipulation of ice chips. Pt with quick fatigue and appeared to fall asleep as session progressed.       Clinical Impressions  Pt is presenting with concerns for dysphagia given hx of CVA, deconditioning, and lethargy. Would recommend continuation of NPO with NG for nutrition, but okay for ice chips to minimize xerostomia and maintain integrity of hte swallow. Per family, pt to d/c to SNF today. Would recommend dysphagia therapy at next level of care.      Recommendations  Treatment Completed: Dysphagia Treatment       Dysphagia Treatment  Diet Consistency: NPO with NG  Instrumentation: Instrumental swallow study pending clinical progress  Medication: Non Oral  Oral Care: Q6h        SLP Treatment Plan  Treatment Plan: Dysphagia Treatment  SLP Frequency: 3x Per Week  Estimated Duration: Until Therapy Goals Met      Anticipated Discharge Needs  Discharge Recommendations: Recommend post-acute placement for additional speech therapy services prior to discharge home  Therapy Recommendations Upon DC: Dysphagia Training      Patient / Family Goals  Patient / Family Goal #1: \"I like the ice\"  Goal #1 Outcome: Progressing as expected  Short " Term Goals  Short Term Goal # 1: Patient will consume a full/thin liquid diet without overt indicators of airway invasion or decline in pulmonary status.  Goal Outcome # 1: Goal not met      Angela Delarosa, SLP

## 2024-04-10 ENCOUNTER — TELEPHONE (OUTPATIENT)
Dept: INFECTIOUS DISEASES | Facility: MEDICAL CENTER | Age: 89
End: 2024-04-10
Payer: MEDICARE

## 2024-04-20 ENCOUNTER — HOSPITAL ENCOUNTER (OUTPATIENT)
Facility: MEDICAL CENTER | Age: 89
End: 2024-04-20
Attending: INTERNAL MEDICINE

## 2024-04-20 LAB
ALBUMIN SERPL BCP-MCNC: 3.6 G/DL (ref 3.2–4.9)
ALBUMIN/GLOB SERPL: 1.1 G/DL
ALP SERPL-CCNC: 108 U/L (ref 30–99)
ALT SERPL-CCNC: 18 U/L (ref 2–50)
ANION GAP SERPL CALC-SCNC: 13 MMOL/L (ref 7–16)
AST SERPL-CCNC: 19 U/L (ref 12–45)
BASOPHILS # BLD AUTO: 0.8 % (ref 0–1.8)
BASOPHILS # BLD: 0.06 K/UL (ref 0–0.12)
BILIRUB SERPL-MCNC: 0.7 MG/DL (ref 0.1–1.5)
BUN SERPL-MCNC: 11 MG/DL (ref 8–22)
CALCIUM ALBUM COR SERPL-MCNC: 8.3 MG/DL (ref 8.5–10.5)
CALCIUM SERPL-MCNC: 8 MG/DL (ref 8.5–10.5)
CHLORIDE SERPL-SCNC: 105 MMOL/L (ref 96–112)
CO2 SERPL-SCNC: 20 MMOL/L (ref 20–33)
CREAT SERPL-MCNC: 0.49 MG/DL (ref 0.5–1.4)
EOSINOPHIL # BLD AUTO: 0.33 K/UL (ref 0–0.51)
EOSINOPHIL NFR BLD: 4.6 % (ref 0–6.9)
ERYTHROCYTE [DISTWIDTH] IN BLOOD BY AUTOMATED COUNT: 50.4 FL (ref 35.9–50)
GFR SERPLBLD CREATININE-BSD FMLA CKD-EPI: 91 ML/MIN/1.73 M 2
GLOBULIN SER CALC-MCNC: 3.3 G/DL (ref 1.9–3.5)
GLUCOSE SERPL-MCNC: 136 MG/DL (ref 65–99)
HCT VFR BLD AUTO: 40.8 % (ref 37–47)
HGB BLD-MCNC: 13.2 G/DL (ref 12–16)
IMM GRANULOCYTES # BLD AUTO: 0.01 K/UL (ref 0–0.11)
IMM GRANULOCYTES NFR BLD AUTO: 0.1 % (ref 0–0.9)
LYMPHOCYTES # BLD AUTO: 0.91 K/UL (ref 1–4.8)
LYMPHOCYTES NFR BLD: 12.6 % (ref 22–41)
MCH RBC QN AUTO: 29.1 PG (ref 27–33)
MCHC RBC AUTO-ENTMCNC: 32.4 G/DL (ref 32.2–35.5)
MCV RBC AUTO: 89.9 FL (ref 81.4–97.8)
MONOCYTES # BLD AUTO: 0.54 K/UL (ref 0–0.85)
MONOCYTES NFR BLD AUTO: 7.4 % (ref 0–13.4)
NEUTROPHILS # BLD AUTO: 5.4 K/UL (ref 1.82–7.42)
NEUTROPHILS NFR BLD: 74.5 % (ref 44–72)
NRBC # BLD AUTO: 0 K/UL
NRBC BLD-RTO: 0 /100 WBC (ref 0–0.2)
PLATELET # BLD AUTO: 262 K/UL (ref 164–446)
PMV BLD AUTO: 10.2 FL (ref 9–12.9)
POTASSIUM SERPL-SCNC: 4.3 MMOL/L (ref 3.6–5.5)
PROT SERPL-MCNC: 6.9 G/DL (ref 6–8.2)
RBC # BLD AUTO: 4.54 M/UL (ref 4.2–5.4)
SODIUM SERPL-SCNC: 138 MMOL/L (ref 135–145)
WBC # BLD AUTO: 7.3 K/UL (ref 4.8–10.8)

## 2024-04-20 PROCEDURE — 85025 COMPLETE CBC W/AUTO DIFF WBC: CPT

## 2024-04-20 PROCEDURE — 80053 COMPREHEN METABOLIC PANEL: CPT

## 2024-04-22 ENCOUNTER — TELEPHONE (OUTPATIENT)
Dept: INFECTIOUS DISEASES | Facility: MEDICAL CENTER | Age: 89
End: 2024-04-22
Payer: MEDICARE

## 2024-04-22 NOTE — TELEPHONE ENCOUNTER
Found a mychart cancellation for patients hospital follow up appt tomorrow 4/23/24. This was scheduled with [facility] transport. Comments on cancellation were patient is at [facility] getting treatment from Dr Gonzales. I wasn't sure if this was cancelled by mistake I spoke with Cherelle at [facility] who asked me to add appt in again and she would speak with patient. Received call from Jannette again stated that patients son cancelled appt since he does not wish for patient to have txt here and if patient is not scheduled with Dr Ervin then they do not need any other physician to see patient and just read off paper. Patients son stated he would discuss the need for follow up with Dr Gonzales and decide then. For now Cherelle asked me to keep appointment and she would update me and call me tomorrow.

## 2024-04-23 ENCOUNTER — APPOINTMENT (OUTPATIENT)
Dept: INFECTIOUS DISEASES | Facility: MEDICAL CENTER | Age: 89
End: 2024-04-23
Attending: INTERNAL MEDICINE
Payer: MEDICARE

## 2024-04-23 VITALS
HEIGHT: 62 IN | HEART RATE: 87 BPM | BODY MASS INDEX: 36.44 KG/M2 | SYSTOLIC BLOOD PRESSURE: 94 MMHG | WEIGHT: 198 LBS | OXYGEN SATURATION: 97 % | DIASTOLIC BLOOD PRESSURE: 60 MMHG | TEMPERATURE: 98.2 F | RESPIRATION RATE: 16 BRPM

## 2024-04-23 DIAGNOSIS — I33.0 ACUTE BACTERIAL ENDOCARDITIS: ICD-10-CM

## 2024-04-23 DIAGNOSIS — Z95.2 S/P TAVR (TRANSCATHETER AORTIC VALVE REPLACEMENT): ICD-10-CM

## 2024-04-23 DIAGNOSIS — R78.81 BACTEREMIA DUE TO STREPTOCOCCUS: ICD-10-CM

## 2024-04-23 DIAGNOSIS — B95.5 BACTEREMIA DUE TO STREPTOCOCCUS: ICD-10-CM

## 2024-04-23 PROCEDURE — 3078F DIAST BP <80 MM HG: CPT | Performed by: INTERNAL MEDICINE

## 2024-04-23 PROCEDURE — 99212 OFFICE O/P EST SF 10 MIN: CPT | Performed by: INTERNAL MEDICINE

## 2024-04-23 PROCEDURE — 3074F SYST BP LT 130 MM HG: CPT | Performed by: INTERNAL MEDICINE

## 2024-04-23 PROCEDURE — 99214 OFFICE O/P EST MOD 30 MIN: CPT | Performed by: INTERNAL MEDICINE

## 2024-04-23 ASSESSMENT — FIBROSIS 4 INDEX: FIB4 SCORE: 1.49

## 2024-04-23 NOTE — PROGRESS NOTES
Chief Complaint   Patient presents with    Hospital Follow-up     bacteremia       HISTORY OF PRESENT ILLNESS: Patient is a 88 y.o. female established patient who presents today for hosp FU  Son previously cancelled appointment  Last seen by Dr Guidry 4/7/24 for endocarditis and bacteremia due to Strep gordonii  Discharged on IV PCN through 5/8/24  MRI neg  VINAY deferred per prior discussion with son  Denies SE antibiotics  Appetite good.      Patient Active Problem List    Diagnosis Date Noted    Moderate protein-calorie malnutrition (HCC) 04/02/2024    Endocarditis 03/29/2024    Stroke (cerebrum) (HCC) 03/29/2024    Acute cystitis without hematuria 03/28/2024    Bacteremia due to Streptococcus 03/26/2024    Acute metabolic encephalopathy 03/25/2024    Hyponatremia 03/20/2024    Diarrhea 03/20/2024    Weakness 03/20/2024    Urge incontinence 02/22/2024    Coronary artery disease involving native coronary artery of native heart without angina pectoris 09/26/2023    S/P TAVR (transcatheter aortic valve replacement) 09/25/2023    Dermoid cyst 06/22/2023    Presence of IVC filter 03/09/2023    Carotid atherosclerosis, bilateral 12/15/2022    Protein S deficiency (HCC) 12/15/2022    Lumbar stenosis with neurogenic claudication 09/21/2022    Spondylolisthesis, lumbar region 09/14/2022    Hip asymmetry 08/22/2022    History of recurrent deep vein thrombosis (DVT) 08/22/2022    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    Onychomycosis 12/01/2020    Bilateral knee pain 07/24/2020    Long term (current) use of anticoagulants 06/01/2020    Osteoporosis 04/20/2020    ACP (advance care planning) 08/19/2019    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    History of colon cancer - s/p resection 10/30/2018    Malignant melanoma of skin of left leg (HCC) 10/02/2017    Rheumatoid nodule (HCC) 07/25/2017    Rheumatoid arthritis involving  multiple sites with positive rheumatoid factor (HCC) 03/14/2016    Right bundle branch block 06/27/2012    Hypertension 06/27/2012    Other hyperlipidemia 06/27/2012    GERD (gastroesophageal reflux disease) 06/27/2012    Back pain 06/27/2012       Allergies:Wound dressing adhesive    Current Outpatient Medications   Medication Sig Dispense Refill    aspirin (ASA) 81 MG Chew Tab chewable tablet Chew 1 Tablet every day. 100 Tablet 1    NS SOLN 100 mL with penicillin G potassium [id number] UNIT SOLR 3 Million Units Infuse 3 Million Units into a venous catheter every 4 hours.      losartan (COZAAR) 50 MG Tab TAKE 1 TABLET BY MOUTH EVERY DAY 90 Tablet 0    amLODIPine (NORVASC) 2.5 MG Tab Take 2.5 mg by mouth every evening.      atorvastatin (LIPITOR) 20 MG Tab Take 1 Tablet by mouth every evening. 90 Tablet 3    acetaminophen (TYLENOL) 500 MG Tab Take 750 mg by mouth 2 times a day as needed for Mild Pain. 1.5 in am, 1 tab at noon, 2 in pm  Indications: Pain       No current facility-administered medications for this visit.       Social History     Tobacco Use    Smoking status: Never    Smokeless tobacco: Never   Vaping Use    Vaping Use: Never used   Substance Use Topics    Alcohol use: Not Currently     Comment: rarely    Drug use: Never       Family History   Problem Relation Age of Onset    Cancer Mother         stomach- ruptured appendix    Cancer Father         colon    Leukemia Father         many exposure, worked for [employer] Sister     Heart Disease Brother         s/p stent     Other Son         on blood thinner, unclear etiology    Clotting Disorder Neg Hx        ROS:  Review of Systems   Constitutional: Negative for fever, chills, weight loss and malaise/fatigue.   Respiratory: Negative for cough, shortness of breathAll other systems reviewed and are negative except as in HPI.      Exam:  BP 94/60 (BP Location: Left arm, Patient Position: Sitting, BP Cuff Size: Adult)   Pulse 87   Temp  "36.8 °C (98.2 °F) (Temporal)   Resp 16   Ht 1.575 m (5' 2.01\")   Wt 89.8 kg (198 lb)   SpO2 97%   General:  Elderly and frail female in NAD. Pleasant and cooperative  Head: NCAT, Pupils are equal round reactive to light. Extraocular movements intact.   Neck: Supple.  No JVD  Pulmonary: Unlabored  Cardiovascular: Regular rate   Abdominal:  nondistended.   Skin: No visible rashes.  Extremities: no clubbing, cyanosis RUE PICC nontender no erythema  Neurologic: Alert  Speech is fluent In WC      Assessment/Plan:  1. S/P TAVR (transcatheter aortic valve replacement)        2. Acute bacterial endocarditis        3. Bacteremia due to Streptococcus        Complete 6 weeks IV penicillin as ordered 5/8/24  Labs reviewed-normal CBC, cr  Questions answered and plan of care reviewed  FU TTE recommended to establish new baseline  FU prn     Sofya Johnson M.D.   "

## 2024-09-30 ENCOUNTER — ANTICOAGULATION MONITORING (OUTPATIENT)
Dept: MEDICAL GROUP | Facility: PHYSICIAN GROUP | Age: 89
End: 2024-09-30
Payer: MEDICARE

## 2024-09-30 DIAGNOSIS — Z79.01 LONG TERM (CURRENT) USE OF ANTICOAGULANTS: ICD-10-CM

## 2024-09-30 DIAGNOSIS — I82.5Y2 CHRONIC DEEP VEIN THROMBOSIS (DVT) OF PROXIMAL VEIN OF LEFT LOWER EXTREMITY (HCC): ICD-10-CM

## 2024-09-30 NOTE — PROGRESS NOTES
[place] and established with new provider.  Will discharge from anticoagulation clinic.  Steve Hahn, PerlitaD, BCACP

## 2024-10-11 ENCOUNTER — TELEPHONE (OUTPATIENT)
Dept: CARDIOLOGY | Facility: MEDICAL CENTER | Age: 89
End: 2024-10-11
Payer: MEDICARE

## 2024-10-11 DIAGNOSIS — I45.10 RBBB: ICD-10-CM

## 2024-10-11 DIAGNOSIS — I35.0 NONRHEUMATIC AORTIC (VALVE) STENOSIS: ICD-10-CM

## 2024-12-30 ENCOUNTER — APPOINTMENT (OUTPATIENT)
Dept: RHEUMATOLOGY | Facility: MEDICAL CENTER | Age: 89
End: 2024-12-30
Attending: INTERNAL MEDICINE
Payer: MEDICARE

## 2025-05-01 NOTE — THERAPY
Bed: ED15  Expected date:   Expected time:   Means of arrival:   Comments:  Shelia 598 46 yo m Seizures.  Complicated history.  BS over 600   Physical Therapy   Initial Evaluation     Patient Name: Marry Mathur  Age:  88 y.o., Sex:  female  Medical Record #: [id number]  Today's Date: 9/26/2023     Precautions  Comments: s/p TAVR    Assessment  Patient is 88 y.o. female presenting to physical therapy s/p TAVR. Pt reports she ambulated the entirety of the unit this AM with nursing. Felt good until last bit, then noted chest pressure/pain for approx 3 hours following ambuation. Pt educated on post TAVR goals for activity, use of talk test for activity pacing, progressive increase activity tolerance over the next 4-6 weeks, and use of AD (SPC, FWW) in home vs community. Pt very receptive to education and thankful for the information/ handout. No further acute PT needs at this time.    Plan  No further acute PT needs at this time          Discharge Recommendations:  (cardiac rehab if able to access)       09/26/23 1010   Precautions   Comments s/p TAVR   Vitals   O2 Delivery Device Room air w/o2 available   Prior Living Situation   Prior Services Home-Independent   Housing / Facility 1 Story House   Steps Into Home 1  (or ramp)   Steps In Home 0   Equipment Owned Front-Wheel Walker;Single Point Cane   Lives with - Patient's Self Care Capacity Alone and Able to Care For Self   Prior Level of Functional Mobility   Bed Mobility Independent   Transfer Status Independent   Ambulation Independent   Ambulation Distance community   Assistive Devices Used None   Stairs Independent   Education Group   Education Provided Role of Physical Therapist   Role of Physical Therapist Patient Response Patient;Acceptance;Explanation;Verbal Demonstration   Additional Comments activity s/p TAVR education with handout provided   Anticipated Discharge Equipment and Recommendations   Discharge Recommendations   (cardiac rehab if able to access)   Interdisciplinary Plan of Care Collaboration   IDT Collaboration with  Nursing   Collaboration Comments PT consult received s/p TAVR.  Pt reports she ambulated the entirety of the unit this AM with nursing. Felt good until last bit, then noted chest pressure/pain for approx 3 hours following ambuation. Pt educated on post TAVR goals for activity, use of talk test for activity pacing, progressive increase activity tolerance over the next 4-6 weeks, and use of AD (SPC, FWW) in home vs community. Pt very receptive to education and thankful for the information/ handout. No further acute PT needs at this time.   Session Information   Date / Session Number  9/26- education only

## (undated) DEVICE — MASK, LARYNGEAL AIRWAY #5

## (undated) DEVICE — GOWN WARMING STANDARD FLEX - (30/CA)

## (undated) DEVICE — SPONGE PEANUT - (5/PK 50PK/CA)

## (undated) DEVICE — TOWEL STOP TIMEOUT SAFETY FLAG (40EA/CA)

## (undated) DEVICE — GLOVE BIOGEL SZ 8 SURGICAL PF LTX - (50PR/BX 4BX/CA)

## (undated) DEVICE — SUTURE 4-0 MONOCRYL PLUS PS-2 - 27 INCH (36/BX)

## (undated) DEVICE — COVER LIGHT HANDLE ALC PLUS DISP (18EA/BX)

## (undated) DEVICE — SUTURE DEVICE CLOSURE REPAIR SYSTEM PERCLOSE PROSTYLE (10EA/BX)

## (undated) DEVICE — DRAPE LAPAROTOMY T SHEET - (12EA/CA)

## (undated) DEVICE — SET EXTENSION WITH 2 PORTS (48EA/CA) ***PART #2C8610 IS A SUBSTITUTE*****

## (undated) DEVICE — BLANKET UNDERBODY FULL ACCES - (5/CA)

## (undated) DEVICE — DEVICE INFLATION NOVAFLEX ATRION LOCK SYRINGE 29MM 38ML (1EA)

## (undated) DEVICE — BOVIE BLADE COATED &INSULATED - 25/PK

## (undated) DEVICE — GLOVE BIOGEL INDICATOR SZ 7.5 SURGICAL PF LTX - (50PR/BX 4BX/CA)

## (undated) DEVICE — ELECTRODE 850 FOAM ADHESIVE - HYDROGEL RADIOTRNSPRNT (50/PK)

## (undated) DEVICE — CATHETER ON-Q SILVER SOAKER 5IN  (5EA/CA)  - SUB ORDER #4428

## (undated) DEVICE — DRESSING XEROFORM 1X8 - (50/BX 4BX/CA)

## (undated) DEVICE — PAD LAP STERILE 18 X 18 - (5/PK 40PK/CA)

## (undated) DEVICE — BANDAGE ELASTIC 4 HONEYCOMB - 4"X5YD LF (20/CA)"

## (undated) DEVICE — CHLORAPREP 26 ML APPLICATOR - ORANGE TINT(25/CA)

## (undated) DEVICE — INTRODUCER SHEATH 6FR 2.5CM - DILATOR PROTRUDING (10/BX)

## (undated) DEVICE — SET LEADWIRE 5 LEAD BEDSIDE DISPOSABLE ECG (1SET OF 5/EA)

## (undated) DEVICE — SODIUM CHL IRRIGATION 0.9% 1000ML (12EA/CA)

## (undated) DEVICE — DEVICE MONOPOLAR RF PEAK PLASMABLADE 3.0S

## (undated) DEVICE — KIT  I.V. START (100EA/CA)

## (undated) DEVICE — PROTECTOR ULNA NERVE - (36PR/CA)

## (undated) DEVICE — MASK ANESTHESIA ADULT  - (100/CA)

## (undated) DEVICE — Device

## (undated) DEVICE — SUTURE  0 ETHIBOND CT-1 30 IN (36PK/BX)

## (undated) DEVICE — ADHESIVE DERMABOND HVD MINI (12EA/BX)

## (undated) DEVICE — KIT RETROFIT PROBE COVERS (24EA/EA)

## (undated) DEVICE — LIGASURE TISSUE FUSION  - SINGLE USE (6/CA)

## (undated) DEVICE — SPONGE RADIOPAQUE CTN X-LG - STERILE (50PK/CA) MADE TO ORDER ITEM AND HAS A 4-6 WEEK LEAD TIME

## (undated) DEVICE — GLOVE BIOGEL SZ 7 SURGICAL PF LTX - (50PR/BX 4BX/CA)

## (undated) DEVICE — SLEEVE VASO CALF MED - (10PR/CA)

## (undated) DEVICE — NEPTUNE 4 PORT MANIFOLD - (20/PK)

## (undated) DEVICE — LACTATED RINGERS INJ 1000 ML - (14EA/CA 60CA/PF)

## (undated) DEVICE — DRESSING TRANSPARENT FILM TEGADERM 4 X 4.75" (50EA/BX)"

## (undated) DEVICE — ELECTRODE DUAL RETURN W/ CORD - (50/PK)

## (undated) DEVICE — SHEATH RO 6F 10CM (10EA/BX)

## (undated) DEVICE — CLOSURE SKIN STRIP 1/2 X 4 IN - (STERI STRIP) (50/BX 4BX/CA)

## (undated) DEVICE — SENSOR OXIMETER ADULT SPO2 RD SET (20EA/BX)

## (undated) DEVICE — TOOL MR8 14CM MATCH HD SYM-TRI 3MM DIAMETER (1/EA)

## (undated) DEVICE — TUBING CLEARLINK DUO-VENT - C-FLO (48EA/CA)

## (undated) DEVICE — PIN PERCUTANEOUS STERILE 150MM

## (undated) DEVICE — PACK NEURO - (2EA/CA)

## (undated) DEVICE — SUCTION INSTRUMENT YANKAUER BULBOUS TIP W/O VENT (50EA/CA)

## (undated) DEVICE — TUBE E-T HI-LO CUFF 7.0MM (10EA/PK)

## (undated) DEVICE — SUTURE ETHILON 2-0 FSLX 30 (36PK/BX)"

## (undated) DEVICE — GLOVE BIOGEL INDICATOR SZ 8 SURGICAL PF LTX - (50/BX 4BX/CA)

## (undated) DEVICE — LIGASURE SM JAW SEALER CRVD - (6EA/CA)

## (undated) DEVICE — SLEEVE, VASO, THIGH, MED

## (undated) DEVICE — INTRAOP NEURO IN OR 1:1 PER 15 MIN

## (undated) DEVICE — SUTURE GENERAL

## (undated) DEVICE — IV TUBING HI-FLO RATE W/CLAMP (50/CA)

## (undated) DEVICE — ELECTRODE RADIOLUCNT SOLID GEL DEFIB PADS (12EA/CA)

## (undated) DEVICE — PACK TAVR (3EA/CA)

## (undated) DEVICE — CABLE TEMPORARY PACING

## (undated) DEVICE — TOWELS CLOTH SURGICAL - (4/PK 20PK/CA)

## (undated) DEVICE — CANISTER SUCTION RIGID RED 1500CC (40EA/CA)

## (undated) DEVICE — BOVIE  BLADE 6 EXTENDED - (50/PK)

## (undated) DEVICE — HEAD HOLDER JUNIOR/ADULT

## (undated) DEVICE — CLIP MED LG INTNL HRZN TI ESCP - (20/BX)

## (undated) DEVICE — HEADREST PRONEVIEW LARGE - (10/CA)

## (undated) DEVICE — SUTURE 1 VICRYL PLUS CTX - 8 X 18 INCH (12/BX)

## (undated) DEVICE — INTRODUCER CATHETER  DILATOR PROTRUDING 8FR 2.5CM (10EA/BX)

## (undated) DEVICE — BLADE SURGICAL #11 - (50/BX)

## (undated) DEVICE — WIRE GUIDE AES .035 260CM WITH 3MM J TIP"

## (undated) DEVICE — SHEET TRANSVERSE LAP - (12EA/CA)

## (undated) DEVICE — DRESSING LEUKOMED STERILE 11.75X4IN - (50/CA)

## (undated) DEVICE — CLIP MED INTNL HRZN TI ESCP - (25/BX)

## (undated) DEVICE — SUTURE 2-0 VICRYL PLUS CT-1 - 8 X 18 INCH(12/BX)

## (undated) DEVICE — BLOCK

## (undated) DEVICE — GLOVE BIOGEL PI INDICATOR SZ 8.0 SURGICAL PF LF -(50/BX 4BX/CA)

## (undated) DEVICE — PACK MAJOR BASIN - (2EA/CA)

## (undated) DEVICE — TOOL DISSECTING BIT MR8 OD3MM L14CM (1EA)

## (undated) DEVICE — COVER LIGHT HANDLE FLEXIBLE - SOFT (2EA/PK 80PK/CA)

## (undated) DEVICE — CATHETER 6FR AL1 100CM (5/BX)

## (undated) DEVICE — SEALER BIPOLAR 2.3 AQUAMANTYS

## (undated) DEVICE — GLOVE BIOGEL INDICATOR SZ 6.5 SURGICAL PF LTX - (50PR/BX 4BX/CA)

## (undated) DEVICE — GLOVE BIOGEL INDICATOR SZ 8.5 SURGICAL PF LTX - (50/BX 4BX/CA)

## (undated) DEVICE — CATHETER IV 20 GA X 1-1/4 ---SURG.& SDS ONLY--- (50EA/BX)

## (undated) DEVICE — CANISTER SUCTION 3000ML MECHANICAL FILTER AUTO SHUTOFF MEDI-VAC NONSTERILE LF DISP  (40EA/CA)

## (undated) DEVICE — CUFF BP ADULT MEDIUM DISPOSABLE (20EA/CA)

## (undated) DEVICE — STOPCOCK IV 400 PSI 3W ROT (50EA/BX)

## (undated) DEVICE — ARMREST CRADLE FOAM - (2PR/PK 12PR/CA)

## (undated) DEVICE — CLIP LG INTNL HRZN TI ESCP LGT - (20/BX)

## (undated) DEVICE — KIT ANESTHESIA W/CIRCUIT & 3/LT BAG W/FILTER (20EA/CA)

## (undated) DEVICE — COVER FOOT UNIVERSAL DISP. - (25EA/CA)

## (undated) DEVICE — DRAPE IOBAN II INCISE 23X17 - (10EA/BX 4BX/CA)

## (undated) DEVICE — SUTURE 3-0 SILK 12 X 18 IN - (36/BX)

## (undated) DEVICE — STAPLER SKIN DISP - (6/BX 10BX/CA) VISISTAT

## (undated) DEVICE — LACTATED RINGERS INJ. 500 ML - (24EA/CA)

## (undated) DEVICE — SPONGE GAUZESTER 4 X 4 4PLY - (128PK/CA)

## (undated) DEVICE — SUTURE 3-0 VICRYL PLUS SH - 8X 18 INCH (12/BX)

## (undated) DEVICE — DRAPE MAYO STAND - (30/CA)

## (undated) DEVICE — DEVICE INFLATION ATRION NOVALFEX TRANSFEMORAL SYSTEM (1EA)

## (undated) DEVICE — TUBING C&T SET FLYING LEADS DRAIN TUBING (10EA/BX)

## (undated) DEVICE — SUTURE 4-0 30CM STRATAFIX SPIRAL PS-2 (12EA/BX)

## (undated) DEVICE — STAPLE 75MM LINEAR (12EA/BX)

## (undated) DEVICE — GLIDESHEATH SLENDER NITINOL KIT .021 GW 6FR 10CM SINGLE WALL

## (undated) DEVICE — CELLSAVER PACK

## (undated) DEVICE — WIRE GUIDE LUNDQST.035X180 - TSMG-35-180-4-LES ORDER BY BOX (5EA/BX)

## (undated) DEVICE — PUMP ON-Q 270 DUAL 2ML FIXED RATE (5EA/CA)

## (undated) DEVICE — WATER IRRIGATION STERILE 1000ML (12EA/CA)

## (undated) DEVICE — PADDING CAST 4 IN STERILE - 4 X 4 YDS (24/CA)

## (undated) DEVICE — ARM BAND RADIAL TR BAND (5EA/BX)

## (undated) DEVICE — BLADE SURGICAL #15 - (50/BX 3BX/CA)

## (undated) DEVICE — PACK MINOR BASIN - (2EA/CA)

## (undated) DEVICE — GUIDEWIRE STARTER STRAIGHT FIXED CORE .035 150CM 4 STRAIGHT PTFE/HEPARIN COATED (10/BX)

## (undated) DEVICE — PADDING CAST 6 IN STERILE - 6 X 4 YDS (24/CA)

## (undated) DEVICE — STAPLER 75MM LINEAR OPEN (3EA/BX)

## (undated) DEVICE — SENSOR SPO2 NEO LNCS ADHESIVE (20/BX) SEE USER NOTES

## (undated) DEVICE — KIT SURGIFLO W/OUT THROMBIN - (6EA/CA)

## (undated) DEVICE — KIT ROOM DECONTAMINATION

## (undated) DEVICE — DERMABOND ADVANCED - (12EA/BX)

## (undated) DEVICE — TUBE CONNECT SUCTION CLEAR 120 X 1/4" (50EA/CA)"

## (undated) DEVICE — SUTURE 2-0 VICRYL PLUS SH - 8 X 18 INCH (12/BX)

## (undated) DEVICE — PACK MAJOR ORTHO - (2EA/CA)

## (undated) DEVICE — TRAY SURESTEP FOLEY TEMP SENSING 16FR (10EA/CA) ORDER  #18764 FOR TEMP FOLEY ONLY

## (undated) DEVICE — SUTURE 2-0 SILK 12 X 18" (36PK/BX)"

## (undated) DEVICE — DRAPE LARGE 3 QUARTER - (20/CA)

## (undated) DEVICE — MIDAS LUBRICATOR DIFFUSER PACK (4EA/CA)

## (undated) DEVICE — DECANTER FLD BLS - (50/CA)

## (undated) DEVICE — SUTURE 3-0 ETHILON FS-1 - (36/BX) 30 INCH

## (undated) DEVICE — GOWN SURGICAL XX-LARGE - (28EA/CA) SIRUS NON REINFORCED

## (undated) DEVICE — CATHETER IV 18 GA X 1-3/4 ---SURG.& SDS ONLY---

## (undated) DEVICE — KIT EVACUATER 3 SPRING PVC LF 1/8 DRAIN SIZE (10EA/CA)"

## (undated) DEVICE — SYR ANGIO CNRST INJ HI-PRS 3W 65 - (10EA/CA)"

## (undated) DEVICE — MASK, LARYNGEAL AIRWAY #4

## (undated) DEVICE — BONE MILL BM210

## (undated) DEVICE — DRAPE LOWER EXTREMETY - (6/CA)

## (undated) DEVICE — SPONGE GAUZESTER. 2X2 4-PL - (2/PK 50PK/BX 30BX/CS)

## (undated) DEVICE — BLADE SURGICAL CLIPPER - (50EA/CA)

## (undated) DEVICE — TUBE CONNECTING SUCTION - CLEAR PLASTIC STERILE 72 IN (50EA/CA)

## (undated) DEVICE — DRAPE SURG STERI-DRAPE 7X11OD - (40EA/CA)

## (undated) DEVICE — CELLSAVER STAT

## (undated) DEVICE — SPHERE NAVIGATION STEALTH (5EA/TY 12TY/PK)

## (undated) DEVICE — BONE PRESS SPINAL EDITION HENSLER (10EA/CA)

## (undated) DEVICE — BANDAGE ELASTIC 6 HONEYCOMB - 6X5YD LF (20/CA)"

## (undated) DEVICE — BAG, SPONGE COUNT 50600

## (undated) DEVICE — GLOVE SIZE 7.0 SURGEON ACCELERATOR FREE GREEN (50PR/BX 4BX/CA)

## (undated) DEVICE — CRIMPER CATHETER EDWARDS DISPOSABLE (1EA)

## (undated) DEVICE — GLOVE SZ 8 BIOGEL PI MICRO - PF LF (50PR/BX)

## (undated) DEVICE — GLOVE BIOGEL SZ 7.5 SURGICAL PF LTX - (50PR/BX 4BX/CA)

## (undated) DEVICE — GLOVE SZ 7 BIOGEL PI MICRO - PF LF (50PR/BX 4BX/CA)

## (undated) DEVICE — SUTURE 0 SILK TIES (36PK/BX)

## (undated) DEVICE — CATHETER PIGTAIL 6FR 145 (5EA/BX)

## (undated) DEVICE — SODIUM CHL. IRRIGATION 0.9% 3000ML (4EA/CA 65CA/PF)

## (undated) DEVICE — GLOVE SZ 7.5 BIOGEL PI MICRO - PF LF (50PR/BX)

## (undated) DEVICE — GLOVE SIZE 6.5  SURGEON ACCELERATOR FREE GREEN (50PR/BX)

## (undated) DEVICE — GLOVE BIOGEL PI INDICATOR SZ 6.5 SURGICAL PF LF - (50/BX 4BX/CA)

## (undated) DEVICE — DRAPE CLEAR W/ELASTIC BAND RAD CARM 40 X40" (20EA/CA)"

## (undated) DEVICE — GOWN SURGEONS LARGE - (32/CA)

## (undated) DEVICE — KIT ULTRASND COVER - (20EA/CA)